# Patient Record
Sex: MALE | Race: WHITE | NOT HISPANIC OR LATINO | URBAN - NONMETROPOLITAN AREA
[De-identification: names, ages, dates, MRNs, and addresses within clinical notes are randomized per-mention and may not be internally consistent; named-entity substitution may affect disease eponyms.]

---

## 2018-07-06 DIAGNOSIS — I48.91 ATRIAL FIBRILLATION, UNSPECIFIED TYPE (HCC): Primary | ICD-10-CM

## 2018-07-06 RX ORDER — WARFARIN SODIUM 5 MG/1
TABLET ORAL
Qty: 30 TABLET | Refills: 5 | Status: SHIPPED | OUTPATIENT
Start: 2018-07-06 | End: 2019-03-25 | Stop reason: SDUPTHER

## 2018-07-06 NOTE — TELEPHONE ENCOUNTER
----- Message from [facility] sent at 7/6/2018  8:28 AM EDT -----  Regarding: medication renewal  Contact: [phone]  Received a fax to renew Warfarin 5 mg QD from [facility] in [place]  30 days  5 refills    Juan Arias

## 2018-07-31 ENCOUNTER — TRANSCRIBE ORDERS (OUTPATIENT)
Dept: ADMINISTRATIVE | Facility: HOSPITAL | Age: 58
End: 2018-07-31

## 2018-07-31 ENCOUNTER — APPOINTMENT (OUTPATIENT)
Dept: LAB | Facility: HOSPITAL | Age: 58
End: 2018-07-31
Payer: MEDICARE

## 2018-07-31 DIAGNOSIS — E11.8 TYPE 2 DIABETES MELLITUS WITH COMPLICATION, WITHOUT LONG-TERM CURRENT USE OF INSULIN (HCC): ICD-10-CM

## 2018-07-31 DIAGNOSIS — Z13.9 SCREENING FOR CONDITION: ICD-10-CM

## 2018-07-31 DIAGNOSIS — E78.5 HYPERLIPIDEMIA, UNSPECIFIED HYPERLIPIDEMIA TYPE: ICD-10-CM

## 2018-07-31 DIAGNOSIS — E55.9 VITAMIN D DEFICIENCY: ICD-10-CM

## 2018-07-31 DIAGNOSIS — E11.8 TYPE 2 DIABETES MELLITUS WITH COMPLICATION, WITHOUT LONG-TERM CURRENT USE OF INSULIN (HCC): Primary | ICD-10-CM

## 2018-07-31 LAB
25(OH)D3 SERPL-MCNC: 12.1 NG/ML (ref 30–100)
ALBUMIN SERPL BCP-MCNC: 3.5 G/DL (ref 3.5–5)
ALP SERPL-CCNC: 125 U/L (ref 46–116)
ALT SERPL W P-5'-P-CCNC: 32 U/L (ref 12–78)
ANION GAP SERPL CALCULATED.3IONS-SCNC: 12 MMOL/L (ref 4–13)
AST SERPL W P-5'-P-CCNC: 30 U/L (ref 5–45)
BILIRUB SERPL-MCNC: 0.5 MG/DL (ref 0.2–1)
BUN SERPL-MCNC: 21 MG/DL (ref 5–25)
CALCIUM SERPL-MCNC: 8.9 MG/DL (ref 8.3–10.1)
CHLORIDE SERPL-SCNC: 102 MMOL/L (ref 100–108)
CHOLEST SERPL-MCNC: 125 MG/DL (ref 50–200)
CO2 SERPL-SCNC: 22 MMOL/L (ref 21–32)
CREAT SERPL-MCNC: 1.47 MG/DL (ref 0.6–1.3)
EST. AVERAGE GLUCOSE BLD GHB EST-MCNC: 114 MG/DL
GFR SERPL CREATININE-BSD FRML MDRD: 51 ML/MIN/1.73SQ M
GLUCOSE P FAST SERPL-MCNC: 121 MG/DL (ref 65–99)
HBA1C MFR BLD: 5.6 % (ref 4.2–6.3)
HDLC SERPL-MCNC: 29 MG/DL (ref 40–60)
LDLC SERPL CALC-MCNC: 65 MG/DL (ref 0–100)
NONHDLC SERPL-MCNC: 96 MG/DL
POTASSIUM SERPL-SCNC: 5 MMOL/L (ref 3.5–5.3)
PROT SERPL-MCNC: 7.7 G/DL (ref 6.4–8.2)
SODIUM SERPL-SCNC: 136 MMOL/L (ref 136–145)
TRIGL SERPL-MCNC: 157 MG/DL
TSH SERPL DL<=0.05 MIU/L-ACNC: 2 UIU/ML (ref 0.36–3.74)

## 2018-07-31 PROCEDURE — 80061 LIPID PANEL: CPT

## 2018-07-31 PROCEDURE — 80053 COMPREHEN METABOLIC PANEL: CPT

## 2018-07-31 PROCEDURE — 82306 VITAMIN D 25 HYDROXY: CPT

## 2018-07-31 PROCEDURE — 83036 HEMOGLOBIN GLYCOSYLATED A1C: CPT

## 2018-07-31 PROCEDURE — 84443 ASSAY THYROID STIM HORMONE: CPT

## 2018-07-31 PROCEDURE — 36415 COLL VENOUS BLD VENIPUNCTURE: CPT

## 2018-09-01 DIAGNOSIS — I48.91 ATRIAL FIBRILLATION, UNSPECIFIED TYPE (HCC): Primary | ICD-10-CM

## 2018-09-05 RX ORDER — DIGOXIN 250 MCG
TABLET ORAL
Qty: 30 TABLET | Refills: 5 | Status: SHIPPED | OUTPATIENT
Start: 2018-09-05 | End: 2019-03-19 | Stop reason: SDUPTHER

## 2018-10-03 DIAGNOSIS — I25.10 CORONARY ARTERY DISEASE INVOLVING NATIVE CORONARY ARTERY OF NATIVE HEART WITHOUT ANGINA PECTORIS: Primary | ICD-10-CM

## 2018-10-03 RX ORDER — FUROSEMIDE 40 MG/1
TABLET ORAL
Qty: 30 TABLET | Refills: 5 | Status: SHIPPED | OUTPATIENT
Start: 2018-10-03 | End: 2019-02-26 | Stop reason: SDUPTHER

## 2018-10-03 RX ORDER — CARVEDILOL 6.25 MG/1
TABLET ORAL
Qty: 60 TABLET | Refills: 5 | Status: SHIPPED | OUTPATIENT
Start: 2018-10-03 | End: 2019-02-28 | Stop reason: SDUPTHER

## 2018-10-17 PROBLEM — I50.9 CONGESTIVE HEART FAILURE (CHF) (HCC): Status: ACTIVE | Noted: 2018-10-17

## 2018-10-17 PROBLEM — E66.9 OBESITY: Status: ACTIVE | Noted: 2018-10-17

## 2018-10-17 PROBLEM — E11.9 DIABETES MELLITUS (HCC): Status: ACTIVE | Noted: 2018-10-17

## 2018-10-17 PROBLEM — I48.91 ATRIAL FIBRILLATION (HCC): Status: ACTIVE | Noted: 2018-10-17

## 2018-10-17 RX ORDER — LISINOPRIL 10 MG/1
10 TABLET ORAL DAILY
COMMUNITY
End: 2019-01-29 | Stop reason: SDUPTHER

## 2018-10-24 ENCOUNTER — OFFICE VISIT (OUTPATIENT)
Dept: CARDIOLOGY CLINIC | Facility: CLINIC | Age: 58
End: 2018-10-24
Payer: MEDICARE

## 2018-10-24 ENCOUNTER — ANTICOAG VISIT (OUTPATIENT)
Dept: CARDIOLOGY CLINIC | Facility: CLINIC | Age: 58
End: 2018-10-24
Payer: MEDICARE

## 2018-10-24 VITALS
SYSTOLIC BLOOD PRESSURE: 120 MMHG | HEIGHT: 72 IN | WEIGHT: 310 LBS | HEART RATE: 93 BPM | DIASTOLIC BLOOD PRESSURE: 70 MMHG | BODY MASS INDEX: 41.99 KG/M2

## 2018-10-24 DIAGNOSIS — I89.0 LYMPHEDEMA: ICD-10-CM

## 2018-10-24 DIAGNOSIS — I48.20 CHRONIC ATRIAL FIBRILLATION (HCC): ICD-10-CM

## 2018-10-24 DIAGNOSIS — Z79.01 LONG TERM (CURRENT) USE OF ANTICOAGULANTS: Primary | ICD-10-CM

## 2018-10-24 DIAGNOSIS — I48.20 CHRONIC A-FIB (HCC): Primary | ICD-10-CM

## 2018-10-24 DIAGNOSIS — I50.32 CHRONIC DIASTOLIC CONGESTIVE HEART FAILURE (HCC): ICD-10-CM

## 2018-10-24 LAB — INTERNATIONAL NORMALIZATION RATIO, POC: 1.7

## 2018-10-24 PROCEDURE — 85610 PROTHROMBIN TIME: CPT | Performed by: PHYSICIAN ASSISTANT

## 2018-10-24 PROCEDURE — 93000 ELECTROCARDIOGRAM COMPLETE: CPT | Performed by: INTERNAL MEDICINE

## 2018-10-24 PROCEDURE — 99214 OFFICE O/P EST MOD 30 MIN: CPT | Performed by: INTERNAL MEDICINE

## 2018-10-24 PROCEDURE — 99211 OFF/OP EST MAY X REQ PHY/QHP: CPT | Performed by: PHYSICIAN ASSISTANT

## 2018-10-24 RX ORDER — ERGOCALCIFEROL 1.25 MG/1
50000 CAPSULE ORAL WEEKLY
COMMUNITY
End: 2019-04-11 | Stop reason: ALTCHOICE

## 2018-10-24 NOTE — PATIENT INSTRUCTIONS

## 2018-10-24 NOTE — PROGRESS NOTES
Subjective:        Patient ID: Sally Lara is a 60 y o  male  Chief Complaint:  June Martin is here for routine cardiac follow-up  Denies any concerning symptoms  He admits to mild chronic dyspnea on exertion nothing worse than his usual   Denies any chest pain, palpitations, presyncope, syncope  His legs are chronically edematous/swallowing, nothing new here  He has put on some weight  He has not had a protime/INR since February  He complains of excess bruising  INR today 1 7  I had a lengthy discussion with him regarding the risk of anticoagulant such as warfarin without routine blood work, he says he will be better getting this done monthly  EKG rate controlled AFib  The following portions of the patient's history were reviewed and updated as appropriate: allergies, current medications, past family history, past medical history, past social history, past surgical history and problem list   Review of Systems   Constitution: Negative for chills, diaphoresis, malaise/fatigue and weight gain  HENT: Negative for nosebleeds and stridor  Eyes: Negative for double vision, vision loss in left eye, vision loss in right eye and visual disturbance  Cardiovascular: Positive for dyspnea on exertion and leg swelling  Negative for chest pain, claudication, cyanosis, irregular heartbeat, near-syncope, orthopnea, palpitations, paroxysmal nocturnal dyspnea and syncope  Respiratory: Negative for cough, shortness of breath, snoring and wheezing  Endocrine: Negative for polydipsia, polyphagia and polyuria  Hematologic/Lymphatic: Negative for bleeding problem  Does not bruise/bleed easily  Skin: Negative for flushing and rash  Musculoskeletal: Negative for falls and myalgias  Gastrointestinal: Negative for abdominal pain, heartburn, hematemesis, hematochezia, melena and nausea  Genitourinary: Negative for hematuria     Neurological: Negative for brief paralysis, dizziness, focal weakness, headaches, light-headedness, loss of balance and vertigo  Psychiatric/Behavioral: Negative for altered mental status and substance abuse  Allergic/Immunologic: Negative for hives  Objective:      /70   Pulse 93   Ht 6' (1 829 m)   Wt (!) 141 kg (310 lb)   BMI 42 04 kg/m²   Physical Exam   Constitutional: He is oriented to person, place, and time  He appears well-developed and well-nourished  No distress  HENT:   Head: Normocephalic and atraumatic  Eyes: Pupils are equal, round, and reactive to light  EOM are normal  No scleral icterus  Neck: Normal range of motion  Neck supple  No JVD present  No thyromegaly present  Cardiovascular: Normal rate and normal heart sounds  Exam reveals no gallop and no friction rub  No murmur heard  Pulmonary/Chest: Effort normal and breath sounds normal  No stridor  No respiratory distress  He has no wheezes  He has no rales  Abdominal: Soft  Bowel sounds are normal  He exhibits no distension and no mass  There is no tenderness  Musculoskeletal: Normal range of motion  He exhibits edema  He exhibits no deformity  Neurological: He is alert and oriented to person, place, and time  Coordination normal    Skin: Skin is warm and dry  No erythema  No pallor  Psychiatric: He has a normal mood and affect   His behavior is normal        Lab Review:   Appointment on 07/31/2018   Component Date Value    Sodium 07/31/2018 136     Potassium 07/31/2018 5 0     Chloride 07/31/2018 102     CO2 07/31/2018 22     ANION GAP 07/31/2018 12     BUN 07/31/2018 21     Creatinine 07/31/2018 1 47*    Glucose, Fasting 07/31/2018 121*    Calcium 07/31/2018 8 9     AST 07/31/2018 30     ALT 07/31/2018 32     Alkaline Phosphatase 07/31/2018 125*    Total Protein 07/31/2018 7 7     Albumin 07/31/2018 3 5     Total Bilirubin 07/31/2018 0 50     eGFR 07/31/2018 51     TSH 3RD GENERATON 07/31/2018 2 002     Vit D, 25-Hydroxy 07/31/2018 12 1*    Cholesterol 07/31/2018 125     Triglycerides 07/31/2018 157*    HDL, Direct 07/31/2018 29*    LDL Calculated 07/31/2018 65     Non-HDL-Chol (CHOL-HDL) 07/31/2018 96     Hemoglobin A1C 07/31/2018 5 6     EAG 07/31/2018 114      No results found  Assessment:       1  Chronic a-fib (HCC)  POCT ECG    NT-BNP PRO   2  Chronic diastolic congestive heart failure (HCC)     3  Lymphedema          Plan:       Madelaine Wilcox is without any signs or symptoms reminiscent of angina, decompensated heart failure nor electrical instability  His lower extremity edema, lymphedema, is mostly obesity and dependent due to inactivity  He politely declined referral to lymphedema management clinical/wound care today  I ordered a BNP to see if we need to escalate his diuretics, I have some pause in light of mildly decreased renal function on his latest blood work noted  I stressed the importance of monthly INRs  I gave him a list of all the novel oral anticoagulants out there and told him to take the list to the pharmacy and get pricing, if affordable he will call us and let us know immediately to switch him to 1 of these  I will see him back in 6 months, sooner as needed

## 2018-10-24 NOTE — PROGRESS NOTES
Patient missed Monday dose  Dr Thi Goss saw him today and told him how he is not safe taking coumadin without getting his INR done  Patient was very overdue  Made him a tentative appt next week in [place] for INR since he works in [employer]  I told him I would call him back once I talked to you if he needs to increase or not  Increase dose and recheck next week  LMOM

## 2018-10-31 ENCOUNTER — APPOINTMENT (OUTPATIENT)
Dept: LAB | Facility: HOSPITAL | Age: 58
End: 2018-10-31
Attending: INTERNAL MEDICINE
Payer: MEDICARE

## 2018-10-31 DIAGNOSIS — I48.20 CHRONIC A-FIB (HCC): ICD-10-CM

## 2018-10-31 LAB — NT-PROBNP SERPL-MCNC: 1807 PG/ML

## 2018-10-31 PROCEDURE — 83880 ASSAY OF NATRIURETIC PEPTIDE: CPT

## 2018-10-31 PROCEDURE — 36415 COLL VENOUS BLD VENIPUNCTURE: CPT

## 2018-11-01 ENCOUNTER — ANTICOAG VISIT (OUTPATIENT)
Dept: CARDIOLOGY CLINIC | Facility: CLINIC | Age: 58
End: 2018-11-01
Payer: MEDICARE

## 2018-11-01 DIAGNOSIS — I48.20 CHRONIC ATRIAL FIBRILLATION (HCC): ICD-10-CM

## 2018-11-01 DIAGNOSIS — Z79.01 LONG TERM (CURRENT) USE OF ANTICOAGULANTS: Primary | ICD-10-CM

## 2018-11-01 LAB — INTERNATIONAL NORMALIZATION RATIO, POC: 2

## 2018-11-01 PROCEDURE — 85610 PROTHROMBIN TIME: CPT | Performed by: PHYSICIAN ASSISTANT

## 2018-11-01 PROCEDURE — 99211 OFF/OP EST MAY X REQ PHY/QHP: CPT | Performed by: PHYSICIAN ASSISTANT

## 2018-11-01 NOTE — PROGRESS NOTES
No issues  Please advise  If no changes in medication health or diet  No missed or extra doses  No s/s of bleeding TIA or CVA  No new ABX, NSAIDs OCT meds, or supplements  Continue the same dose and recheck in one month     Christiano Ho PA-C

## 2019-01-24 ENCOUNTER — ANTICOAG VISIT (OUTPATIENT)
Dept: CARDIOLOGY CLINIC | Facility: CLINIC | Age: 59
End: 2019-01-24
Payer: MEDICARE

## 2019-01-24 DIAGNOSIS — Z79.01 LONG TERM (CURRENT) USE OF ANTICOAGULANTS: Primary | ICD-10-CM

## 2019-01-24 DIAGNOSIS — I48.20 CHRONIC ATRIAL FIBRILLATION (HCC): ICD-10-CM

## 2019-01-24 LAB — INTERNATIONAL NORMALIZATION RATIO, POC: 2.9

## 2019-01-24 PROCEDURE — 85610 PROTHROMBIN TIME: CPT | Performed by: PHYSICIAN ASSISTANT

## 2019-01-24 PROCEDURE — 93793 ANTICOAG MGMT PT WARFARIN: CPT | Performed by: PHYSICIAN ASSISTANT

## 2019-01-24 NOTE — PATIENT INSTRUCTIONS
If no changes in medication health or diet  No missed or extra doses  No s/s of bleeding TIA or CVA  No new ABX, NSAIDs OCT meds, or supplements  Dose as instructed and recheck in one month     Diana Nash PA-C

## 2019-01-29 DIAGNOSIS — I10 ESSENTIAL HYPERTENSION: Primary | ICD-10-CM

## 2019-01-31 RX ORDER — LISINOPRIL 10 MG/1
TABLET ORAL
Qty: 90 TABLET | Refills: 3 | Status: SHIPPED | OUTPATIENT
Start: 2019-01-31 | End: 2019-02-28 | Stop reason: SDUPTHER

## 2019-02-25 ENCOUNTER — TRANSCRIBE ORDERS (OUTPATIENT)
Dept: ADMINISTRATIVE | Facility: HOSPITAL | Age: 59
End: 2019-02-25

## 2019-02-25 ENCOUNTER — HOSPITAL ENCOUNTER (OUTPATIENT)
Dept: RADIOLOGY | Facility: HOSPITAL | Age: 59
Discharge: HOME | End: 2019-02-25
Payer: MEDICARE

## 2019-02-25 DIAGNOSIS — M19.90 OSTEOARTHRITIS, UNSPECIFIED OSTEOARTHRITIS TYPE, UNSPECIFIED SITE: ICD-10-CM

## 2019-02-25 DIAGNOSIS — M19.90 OSTEOARTHRITIS, UNSPECIFIED OSTEOARTHRITIS TYPE, UNSPECIFIED SITE: Primary | ICD-10-CM

## 2019-02-25 PROCEDURE — 73502 X-RAY EXAM HIP UNI 2-3 VIEWS: CPT

## 2019-02-26 DIAGNOSIS — I25.10 CORONARY ARTERY DISEASE INVOLVING NATIVE CORONARY ARTERY OF NATIVE HEART WITHOUT ANGINA PECTORIS: ICD-10-CM

## 2019-02-27 RX ORDER — FUROSEMIDE 40 MG/1
40 TABLET ORAL DAILY
Qty: 90 TABLET | Refills: 3 | Status: SHIPPED | OUTPATIENT
Start: 2019-02-27 | End: 2020-03-03 | Stop reason: SDUPTHER

## 2019-02-28 DIAGNOSIS — I10 ESSENTIAL HYPERTENSION: ICD-10-CM

## 2019-02-28 DIAGNOSIS — I25.10 CORONARY ARTERY DISEASE INVOLVING NATIVE CORONARY ARTERY OF NATIVE HEART WITHOUT ANGINA PECTORIS: ICD-10-CM

## 2019-02-28 RX ORDER — LISINOPRIL 10 MG/1
10 TABLET ORAL DAILY
Qty: 90 TABLET | Refills: 3 | Status: SHIPPED | OUTPATIENT
Start: 2019-02-28 | End: 2020-01-22

## 2019-02-28 RX ORDER — CARVEDILOL 6.25 MG/1
6.25 TABLET ORAL 2 TIMES DAILY WITH MEALS
Qty: 180 TABLET | Refills: 3 | Status: SHIPPED | OUTPATIENT
Start: 2019-02-28 | End: 2020-03-03 | Stop reason: SDUPTHER

## 2019-03-07 ENCOUNTER — ANTICOAG VISIT (OUTPATIENT)
Dept: CARDIOLOGY CLINIC | Facility: CLINIC | Age: 59
End: 2019-03-07
Payer: MEDICARE

## 2019-03-07 DIAGNOSIS — Z79.01 LONG TERM (CURRENT) USE OF ANTICOAGULANTS: Primary | ICD-10-CM

## 2019-03-07 DIAGNOSIS — I48.20 CHRONIC ATRIAL FIBRILLATION (HCC): ICD-10-CM

## 2019-03-07 LAB — INTERNATIONAL NORMALIZATION RATIO, POC: 3.3

## 2019-03-07 PROCEDURE — 85610 PROTHROMBIN TIME: CPT | Performed by: PHYSICIAN ASSISTANT

## 2019-03-07 PROCEDURE — 93793 ANTICOAG MGMT PT WARFARIN: CPT | Performed by: PHYSICIAN ASSISTANT

## 2019-03-07 NOTE — PATIENT INSTRUCTIONS
If no changes in medication (no new abx, NSAIDs, steroids, OTC meds, or supplements), health, or diet, no missed or extra doses, and no s/sx of bleeding, TIA, or CVA, dose as instructed and recheck in 2 weeks     Jessica Melgar PA-C

## 2019-03-19 DIAGNOSIS — I48.91 ATRIAL FIBRILLATION, UNSPECIFIED TYPE (HCC): ICD-10-CM

## 2019-03-20 RX ORDER — DIGOXIN 250 MCG
TABLET ORAL
Qty: 30 TABLET | Refills: 5 | Status: SHIPPED | OUTPATIENT
Start: 2019-03-20 | End: 2019-10-29 | Stop reason: SDUPTHER

## 2019-03-21 ENCOUNTER — ANTICOAG VISIT (OUTPATIENT)
Dept: CARDIOLOGY CLINIC | Facility: CLINIC | Age: 59
End: 2019-03-21
Payer: MEDICARE

## 2019-03-21 DIAGNOSIS — I48.20 CHRONIC ATRIAL FIBRILLATION (HCC): ICD-10-CM

## 2019-03-21 DIAGNOSIS — Z79.01 LONG TERM (CURRENT) USE OF ANTICOAGULANTS: ICD-10-CM

## 2019-03-21 LAB — SL AMB POCT INR: 3.3

## 2019-03-21 PROCEDURE — 85610 PROTHROMBIN TIME: CPT | Performed by: PHYSICIAN ASSISTANT

## 2019-03-21 PROCEDURE — 93793 ANTICOAG MGMT PT WARFARIN: CPT | Performed by: PHYSICIAN ASSISTANT

## 2019-03-25 DIAGNOSIS — I48.91 ATRIAL FIBRILLATION, UNSPECIFIED TYPE (HCC): ICD-10-CM

## 2019-03-25 RX ORDER — WARFARIN SODIUM 5 MG/1
TABLET ORAL
Qty: 90 TABLET | Refills: 3 | Status: SHIPPED | OUTPATIENT
Start: 2019-03-25 | End: 2020-02-06 | Stop reason: ALTCHOICE

## 2019-04-11 ENCOUNTER — HOSPITAL ENCOUNTER (EMERGENCY)
Facility: HOSPITAL | Age: 59
Discharge: HOME | End: 2019-04-11
Attending: EMERGENCY MEDICINE | Admitting: EMERGENCY MEDICINE
Payer: MEDICARE

## 2019-04-11 VITALS
BODY MASS INDEX: 39.83 KG/M2 | RESPIRATION RATE: 16 BRPM | SYSTOLIC BLOOD PRESSURE: 124 MMHG | HEART RATE: 67 BPM | DIASTOLIC BLOOD PRESSURE: 65 MMHG | HEIGHT: 72 IN | OXYGEN SATURATION: 98 % | WEIGHT: 294.09 LBS | TEMPERATURE: 98.2 F

## 2019-04-11 DIAGNOSIS — I83.813 VARICOSE VEINS OF BILATERAL LOWER EXTREMITIES WITH PAIN: Primary | ICD-10-CM

## 2019-04-11 DIAGNOSIS — N28.9 RENAL INSUFFICIENCY: ICD-10-CM

## 2019-04-11 DIAGNOSIS — D64.9 ANEMIA: ICD-10-CM

## 2019-04-11 LAB
ALBUMIN SERPL BCP-MCNC: 3.6 G/DL (ref 3.5–5)
ALP SERPL-CCNC: 120 U/L (ref 46–116)
ALT SERPL W P-5'-P-CCNC: 25 U/L (ref 12–78)
ANION GAP SERPL CALCULATED.3IONS-SCNC: 13 MMOL/L (ref 4–13)
AST SERPL W P-5'-P-CCNC: 20 U/L (ref 5–45)
BASOPHILS # BLD AUTO: 0.03 THOUSANDS/ΜL (ref 0–0.1)
BASOPHILS NFR BLD AUTO: 1 % (ref 0–1)
BILIRUB SERPL-MCNC: 0.9 MG/DL (ref 0.2–1)
BUN SERPL-MCNC: 42 MG/DL (ref 5–25)
CALCIUM SERPL-MCNC: 9.2 MG/DL (ref 8.3–10.1)
CHLORIDE SERPL-SCNC: 103 MMOL/L (ref 100–108)
CO2 SERPL-SCNC: 23 MMOL/L (ref 21–32)
CREAT SERPL-MCNC: 1.86 MG/DL (ref 0.6–1.3)
EOSINOPHIL # BLD AUTO: 0.1 THOUSAND/ΜL (ref 0–0.61)
EOSINOPHIL NFR BLD AUTO: 2 % (ref 0–6)
ERYTHROCYTE [DISTWIDTH] IN BLOOD BY AUTOMATED COUNT: 13.5 % (ref 11.6–15.1)
GFR SERPL CREATININE-BSD FRML MDRD: 38 ML/MIN/1.73SQ M
GLUCOSE SERPL-MCNC: 194 MG/DL (ref 65–140)
HCT VFR BLD AUTO: 32.1 % (ref 36.5–49.3)
HGB BLD-MCNC: 10.5 G/DL (ref 12–17)
HOLD SPECIMEN: NORMAL
IMM GRANULOCYTES # BLD AUTO: 0.01 THOUSAND/UL (ref 0–0.2)
IMM GRANULOCYTES NFR BLD AUTO: 0 % (ref 0–2)
INR PPP: 1.8 (ref 0.86–1.17)
LYMPHOCYTES # BLD AUTO: 0.74 THOUSANDS/ΜL (ref 0.6–4.47)
LYMPHOCYTES NFR BLD AUTO: 14 % (ref 14–44)
MCH RBC QN AUTO: 35 PG (ref 26.8–34.3)
MCHC RBC AUTO-ENTMCNC: 32.7 G/DL (ref 31.4–37.4)
MCV RBC AUTO: 107 FL (ref 82–98)
MONOCYTES # BLD AUTO: 0.35 THOUSAND/ΜL (ref 0.17–1.22)
MONOCYTES NFR BLD AUTO: 7 % (ref 4–12)
NEUTROPHILS # BLD AUTO: 4.17 THOUSANDS/ΜL (ref 1.85–7.62)
NEUTS SEG NFR BLD AUTO: 76 % (ref 43–75)
NRBC BLD AUTO-RTO: 0 /100 WBCS
PLATELET # BLD AUTO: 99 THOUSANDS/UL (ref 149–390)
PMV BLD AUTO: 11.6 FL (ref 8.9–12.7)
POTASSIUM SERPL-SCNC: 4.8 MMOL/L (ref 3.5–5.3)
PROT SERPL-MCNC: 8.4 G/DL (ref 6.4–8.2)
PROTHROMBIN TIME: 20 SECONDS (ref 11.8–14.2)
RBC # BLD AUTO: 3 MILLION/UL (ref 3.88–5.62)
SODIUM SERPL-SCNC: 139 MMOL/L (ref 136–145)
WBC # BLD AUTO: 5.4 THOUSAND/UL (ref 4.31–10.16)

## 2019-04-11 PROCEDURE — 36415 COLL VENOUS BLD VENIPUNCTURE: CPT | Performed by: EMERGENCY MEDICINE

## 2019-04-11 PROCEDURE — 80053 COMPREHEN METABOLIC PANEL: CPT | Performed by: EMERGENCY MEDICINE

## 2019-04-11 PROCEDURE — 99283 EMERGENCY DEPT VISIT LOW MDM: CPT

## 2019-04-11 PROCEDURE — 85610 PROTHROMBIN TIME: CPT | Performed by: EMERGENCY MEDICINE

## 2019-04-11 PROCEDURE — 99283 EMERGENCY DEPT VISIT LOW MDM: CPT | Performed by: EMERGENCY MEDICINE

## 2019-04-11 PROCEDURE — 85025 COMPLETE CBC W/AUTO DIFF WBC: CPT | Performed by: EMERGENCY MEDICINE

## 2019-04-26 ENCOUNTER — OFFICE VISIT (OUTPATIENT)
Dept: CARDIOLOGY CLINIC | Facility: CLINIC | Age: 59
End: 2019-04-26
Payer: MEDICARE

## 2019-04-26 ENCOUNTER — ANTICOAG VISIT (OUTPATIENT)
Dept: CARDIOLOGY CLINIC | Facility: CLINIC | Age: 59
End: 2019-04-26
Payer: MEDICARE

## 2019-04-26 VITALS
WEIGHT: 285 LBS | HEART RATE: 70 BPM | HEIGHT: 72 IN | BODY MASS INDEX: 38.6 KG/M2 | DIASTOLIC BLOOD PRESSURE: 72 MMHG | SYSTOLIC BLOOD PRESSURE: 118 MMHG

## 2019-04-26 DIAGNOSIS — I89.0 LYMPHEDEMA: ICD-10-CM

## 2019-04-26 DIAGNOSIS — I50.32 CHRONIC DIASTOLIC CONGESTIVE HEART FAILURE (HCC): Primary | ICD-10-CM

## 2019-04-26 DIAGNOSIS — I48.20 CHRONIC ATRIAL FIBRILLATION (HCC): ICD-10-CM

## 2019-04-26 DIAGNOSIS — Z79.01 LONG TERM (CURRENT) USE OF ANTICOAGULANTS: ICD-10-CM

## 2019-04-26 DIAGNOSIS — E66.9 OBESITY WITH SERIOUS COMORBIDITY, UNSPECIFIED CLASSIFICATION, UNSPECIFIED OBESITY TYPE: ICD-10-CM

## 2019-04-26 LAB — SL AMB POCT INR: 1.4

## 2019-04-26 PROCEDURE — 85610 PROTHROMBIN TIME: CPT | Performed by: PHYSICIAN ASSISTANT

## 2019-04-26 PROCEDURE — 99214 OFFICE O/P EST MOD 30 MIN: CPT | Performed by: INTERNAL MEDICINE

## 2019-04-26 PROCEDURE — 93793 ANTICOAG MGMT PT WARFARIN: CPT | Performed by: PHYSICIAN ASSISTANT

## 2019-04-26 RX ORDER — METOLAZONE 5 MG/1
TABLET ORAL
Qty: 15 TABLET | Refills: 3 | Status: SHIPPED | OUTPATIENT
Start: 2019-04-26 | End: 2020-01-22

## 2019-05-01 ENCOUNTER — CONSULT (OUTPATIENT)
Dept: VASCULAR SURGERY | Facility: HOSPITAL | Age: 59
End: 2019-05-01
Payer: MEDICARE

## 2019-05-01 VITALS
TEMPERATURE: 97.4 F | SYSTOLIC BLOOD PRESSURE: 103 MMHG | WEIGHT: 287 LBS | HEART RATE: 96 BPM | DIASTOLIC BLOOD PRESSURE: 61 MMHG | BODY MASS INDEX: 40.18 KG/M2 | HEIGHT: 71 IN

## 2019-05-01 DIAGNOSIS — I89.0 LYMPHEDEMA: ICD-10-CM

## 2019-05-01 DIAGNOSIS — I83.11 VARICOSE VEINS OF BOTH LOWER EXTREMITIES WITH INFLAMMATION: ICD-10-CM

## 2019-05-01 DIAGNOSIS — I83.12 VARICOSE VEINS OF BOTH LOWER EXTREMITIES WITH INFLAMMATION: ICD-10-CM

## 2019-05-01 DIAGNOSIS — E66.01 CLASS 3 SEVERE OBESITY WITH SERIOUS COMORBIDITY AND BODY MASS INDEX (BMI) OF 40.0 TO 44.9 IN ADULT, UNSPECIFIED OBESITY TYPE (HCC): ICD-10-CM

## 2019-05-01 DIAGNOSIS — I87.2 CHRONIC VENOUS INSUFFICIENCY: Primary | ICD-10-CM

## 2019-05-01 PROCEDURE — 99203 OFFICE O/P NEW LOW 30 MIN: CPT | Performed by: NURSE PRACTITIONER

## 2019-05-09 ENCOUNTER — ANTICOAG VISIT (OUTPATIENT)
Dept: CARDIOLOGY CLINIC | Facility: CLINIC | Age: 59
End: 2019-05-09
Payer: MEDICARE

## 2019-05-09 DIAGNOSIS — Z79.01 LONG TERM (CURRENT) USE OF ANTICOAGULANTS: ICD-10-CM

## 2019-05-09 DIAGNOSIS — I48.20 CHRONIC ATRIAL FIBRILLATION (HCC): ICD-10-CM

## 2019-05-09 LAB — SL AMB POCT INR: 2.2

## 2019-05-09 PROCEDURE — 93793 ANTICOAG MGMT PT WARFARIN: CPT | Performed by: PHYSICIAN ASSISTANT

## 2019-05-09 PROCEDURE — 85610 PROTHROMBIN TIME: CPT | Performed by: PHYSICIAN ASSISTANT

## 2019-06-07 ENCOUNTER — ANTICOAG VISIT (OUTPATIENT)
Dept: CARDIOLOGY CLINIC | Facility: CLINIC | Age: 59
End: 2019-06-07
Payer: MEDICARE

## 2019-06-07 DIAGNOSIS — Z79.01 LONG TERM (CURRENT) USE OF ANTICOAGULANTS: ICD-10-CM

## 2019-06-07 DIAGNOSIS — I48.20 CHRONIC ATRIAL FIBRILLATION (HCC): ICD-10-CM

## 2019-06-07 LAB — SL AMB POCT INR: 2.9

## 2019-06-07 PROCEDURE — 85610 PROTHROMBIN TIME: CPT | Performed by: PHYSICIAN ASSISTANT

## 2019-06-07 PROCEDURE — 93793 ANTICOAG MGMT PT WARFARIN: CPT | Performed by: PHYSICIAN ASSISTANT

## 2019-07-04 ENCOUNTER — HOSPITAL ENCOUNTER (EMERGENCY)
Facility: HOSPITAL | Age: 59
Discharge: HOME | End: 2019-07-04
Attending: EMERGENCY MEDICINE
Payer: MEDICARE

## 2019-07-04 VITALS
WEIGHT: 299.38 LBS | OXYGEN SATURATION: 98 % | BODY MASS INDEX: 41.91 KG/M2 | RESPIRATION RATE: 20 BRPM | SYSTOLIC BLOOD PRESSURE: 114 MMHG | HEART RATE: 62 BPM | HEIGHT: 71 IN | TEMPERATURE: 98.4 F | DIASTOLIC BLOOD PRESSURE: 56 MMHG

## 2019-07-04 DIAGNOSIS — S91.319A FOOT LACERATION: Primary | ICD-10-CM

## 2019-07-04 PROCEDURE — 99283 EMERGENCY DEPT VISIT LOW MDM: CPT

## 2019-07-04 PROCEDURE — 99283 EMERGENCY DEPT VISIT LOW MDM: CPT | Performed by: PHYSICIAN ASSISTANT

## 2019-07-04 NOTE — ED PROVIDER NOTES
History  Chief Complaint   Patient presents with    Foot Injury     left foot bleeding, unknown source  PT was getting out of the pool and noticed blood coming out of his foot  Patient presents to the emergency department today for evaluation a bleeding left foot  Patient states he was outside when he noticed the left foot was bleeding  He has neuropathy of the foot therefore does not have much feeling  He denies any bleeding upon arrival however  Denies any novel sensation deficits  Denies pain  States tetanus is up-to-date  Does take warfarin  Prior to Admission Medications   Prescriptions Last Dose Informant Patient Reported? Taking?   carvedilol (COREG) 6 25 mg tablet  Self No No   Sig: Take 1 tablet (6 25 mg total) by mouth 2 (two) times a day with meals   digoxin (LANOXIN) 0 25 mg tablet  Self No No   Sig: TAKE 1 TABLET BY MOUTH ONCE DAILY   furosemide (LASIX) 40 mg tablet  Self No No   Sig: Take 1 tablet (40 mg total) by mouth daily   lisinopril (ZESTRIL) 10 mg tablet  Self No No   Sig: Take 1 tablet (10 mg total) by mouth daily   metFORMIN (GLUCOPHAGE) 1000 MG tablet  Self Yes No   Sig: Take 1,000 mg by mouth 2 (two) times a day with meals  metolazone (ZAROXOLYN) 5 mg tablet  Self No No   Sig: One day a week 30 minutes before lasix dose that day  warfarin (COUMADIN) 10 mg tablet  Self Yes No   Sig: Take 10 mg by mouth every other day Monday wednesday   warfarin (COUMADIN) 5 mg tablet  Self No No   Sig: Take 1 tablet by mouth daily or as directed  Facility-Administered Medications: None       Past Medical History:   Diagnosis Date    A-fib [facility])     Cardiac disease     Chronic combined systolic (congestive) and diastolic (congestive) heart failure (HCC)     Diabetes mellitus [facility] [address] )     Dilated cardiomyopathy ([facility] [address] )     History of echocardiogram 11/24/2014    EF 0 30 (30%), Likely mod LV systolic dysfunction  Likely RV dysfunction as well      History of Lexiscan MPI 02/19/2016    EF 0 43 (43%), no prior MI or ischemia   Hx of echocardiogram 04/28/2017    Normal EF, Normal LV systolic function  Mild concentric LV hypertrophy  Mild mitral and tricuspid regurg   Hx: recurrent pneumonia     Hypertension     Long term (current) use of anticoagulants     Morbid (severe) obesity due to excess calories (HCC)     Neuropathy        Past Surgical History:   Procedure Laterality Date    HERNIA REPAIR      SPLENECTOMY, TOTAL      VASCULAR SURGERY Right     leg       History reviewed  No pertinent family history  I have reviewed and agree with the history as documented  Social History     Tobacco Use    Smoking status: Former Smoker    Smokeless tobacco: Never Used    Tobacco comment: smokes only when he drinks   Substance Use Topics    Alcohol use: Yes     Comment: occasionally    Drug use: No        Review of Systems   Constitutional: Negative  Negative for activity change, appetite change, chills, diaphoresis, fatigue, fever and unexpected weight change  HENT: Negative  Negative for sore throat, trouble swallowing and voice change  Eyes: Negative  Respiratory: Negative  Negative for cough, chest tightness, shortness of breath and wheezing  Cardiovascular: Negative  Negative for chest pain, palpitations and leg swelling  Gastrointestinal: Negative  Negative for abdominal pain, blood in stool, nausea and vomiting  Endocrine: Negative  Genitourinary: Negative  Negative for flank pain and hematuria  Musculoskeletal: Negative  Negative for arthralgias, back pain, gait problem, joint swelling, myalgias, neck pain and neck stiffness  Skin: Positive for wound  Negative for rash  Allergic/Immunologic: Negative  Neurological: Negative  Negative for dizziness, seizures, syncope, weakness, light-headedness and headaches  Hematological: Negative  Psychiatric/Behavioral: Negative  All other systems reviewed and are negative        Physical Exam  Physical Exam   Constitutional: He is oriented to person, place, and time  He appears well-developed and well-nourished  No distress  HENT:   Head: Normocephalic  Eyes: Pupils are equal, round, and reactive to light  Cardiovascular: Normal rate  Pulmonary/Chest: Effort normal    Musculoskeletal: Normal range of motion  Neurological: He is alert and oriented to person, place, and time  Skin: Capillary refill takes less than 2 seconds  He is not diaphoretic  0 25 cm well-approximated nonbleeding laceration noted over the medial aspect of the right foot  No closure required  Area was cleansed extensively  Normal neurovascular motor exams distally    Psychiatric: He has a normal mood and affect  Vitals reviewed  Vital Signs  ED Triage Vitals [07/04/19 1653]   Temperature Pulse Respirations Blood Pressure SpO2   98 4 °F (36 9 °C) 62 20 114/56 98 %      Temp Source Heart Rate Source Patient Position - Orthostatic VS BP Location FiO2 (%)   Temporal Monitor Lying Left arm --      Pain Score       No Pain           Vitals:    07/04/19 1653   BP: 114/56   Pulse: 62   Patient Position - Orthostatic VS: Lying         Visual Acuity      ED Medications  Medications - No data to display    Diagnostic Studies  Results Reviewed     None                 No orders to display              Procedures  Procedures       ED Course  ED Course as of Jul 04 1658   Thu Jul 04, 2019   1655 Blood Pressure: 114/56   1655 Temperature: 98 4 °F (36 9 °C)   1655 Pulse: 62   1655 Respirations: 20   1655 SpO2: 98 %                               MDM    Disposition  Final diagnoses:    Foot laceration     Time reflects when diagnosis was documented in both MDM as applicable and the Disposition within this note     Time User Action Codes Description Comment    7/4/2019  4:56 PM Candida Carrillo Add [U55 [id number]] Foot laceration       ED Disposition     ED Disposition Condition Date/Time Comment    Discharge Good Thu Jul 4, 2019 4:56 PM Kalyani Wilkes discharge to home/self care  Follow-up Information     Follow up With Specialties Details Why [address] [facility] [place], DO Family Medicine Schedule an appointment as soon as possible for a visit   [address] [place] [address]  [phone]            Patient's Medications   Discharge Prescriptions    No medications on file     No discharge procedures on file      ED Provider  Electronically Signed by           Erin Zaidi PA-C  07/04/19 2022

## 2019-07-10 ENCOUNTER — ANTICOAG VISIT (OUTPATIENT)
Dept: CARDIOLOGY CLINIC | Facility: CLINIC | Age: 59
End: 2019-07-10
Payer: MEDICARE

## 2019-07-10 DIAGNOSIS — I48.20 CHRONIC ATRIAL FIBRILLATION (HCC): ICD-10-CM

## 2019-07-10 DIAGNOSIS — Z79.01 LONG TERM (CURRENT) USE OF ANTICOAGULANTS: ICD-10-CM

## 2019-07-10 LAB — SL AMB POCT INR: 3.6

## 2019-07-10 PROCEDURE — 85610 PROTHROMBIN TIME: CPT | Performed by: PHYSICIAN ASSISTANT

## 2019-07-10 PROCEDURE — 93793 ANTICOAG MGMT PT WARFARIN: CPT | Performed by: PHYSICIAN ASSISTANT

## 2019-07-24 ENCOUNTER — ANTICOAG VISIT (OUTPATIENT)
Dept: CARDIOLOGY CLINIC | Facility: CLINIC | Age: 59
End: 2019-07-24
Payer: MEDICARE

## 2019-07-24 DIAGNOSIS — I48.20 CHRONIC ATRIAL FIBRILLATION (HCC): ICD-10-CM

## 2019-07-24 DIAGNOSIS — Z79.01 LONG TERM (CURRENT) USE OF ANTICOAGULANTS: ICD-10-CM

## 2019-07-24 LAB — SL AMB POCT INR: 2

## 2019-07-24 PROCEDURE — 93793 ANTICOAG MGMT PT WARFARIN: CPT | Performed by: PHYSICIAN ASSISTANT

## 2019-07-24 PROCEDURE — 85610 PROTHROMBIN TIME: CPT | Performed by: PHYSICIAN ASSISTANT

## 2019-07-24 NOTE — PATIENT INSTRUCTIONS
If no changes in medication (no new abx, NSAIDs, steroids, OTC meds, or supplements), health, or diet, no missed or extra doses, and no s/sx of bleeding, TIA, or CVA, dose as instructed and recheck in 3 weeks   Left voicemail for pt to call for next appt when he returns from vacation   [place], [place]

## 2019-09-26 ENCOUNTER — ANTICOAG VISIT (OUTPATIENT)
Dept: CARDIOLOGY CLINIC | Facility: CLINIC | Age: 59
End: 2019-09-26
Payer: MEDICARE

## 2019-09-26 DIAGNOSIS — Z79.01 LONG TERM (CURRENT) USE OF ANTICOAGULANTS: ICD-10-CM

## 2019-09-26 DIAGNOSIS — I48.20 CHRONIC ATRIAL FIBRILLATION (HCC): ICD-10-CM

## 2019-09-26 LAB — SL AMB POCT INR: 1.6

## 2019-09-26 PROCEDURE — 93793 ANTICOAG MGMT PT WARFARIN: CPT | Performed by: PHYSICIAN ASSISTANT

## 2019-09-26 PROCEDURE — 85610 PROTHROMBIN TIME: CPT | Performed by: PHYSICIAN ASSISTANT

## 2019-09-30 ENCOUNTER — TELEPHONE (OUTPATIENT)
Dept: VASCULAR SURGERY | Facility: CLINIC | Age: 59
End: 2019-09-30

## 2019-09-30 NOTE — TELEPHONE ENCOUNTER
Called pt to schedule - left message  Appointment Notes   F/U COMPRESSION SLEEVE THERAPY/DISCUSS LYMPADEMA PUMPS

## 2019-10-16 ENCOUNTER — OFFICE VISIT (OUTPATIENT)
Dept: VASCULAR SURGERY | Facility: HOSPITAL | Age: 59
End: 2019-10-16
Payer: MEDICARE

## 2019-10-16 VITALS
DIASTOLIC BLOOD PRESSURE: 63 MMHG | TEMPERATURE: 97.4 F | SYSTOLIC BLOOD PRESSURE: 124 MMHG | HEIGHT: 71 IN | BODY MASS INDEX: 41.98 KG/M2 | WEIGHT: 299.83 LBS | HEART RATE: 88 BPM

## 2019-10-16 DIAGNOSIS — I83.12 VARICOSE VEINS OF BOTH LOWER EXTREMITIES WITH INFLAMMATION: ICD-10-CM

## 2019-10-16 DIAGNOSIS — E11.69 TYPE 2 DIABETES MELLITUS WITH OTHER SPECIFIED COMPLICATION, WITHOUT LONG-TERM CURRENT USE OF INSULIN (HCC): ICD-10-CM

## 2019-10-16 DIAGNOSIS — I87.2 CHRONIC VENOUS INSUFFICIENCY: Primary | ICD-10-CM

## 2019-10-16 DIAGNOSIS — I83.11 VARICOSE VEINS OF BOTH LOWER EXTREMITIES WITH INFLAMMATION: ICD-10-CM

## 2019-10-16 DIAGNOSIS — I10 HYPERTENSION, UNSPECIFIED TYPE: ICD-10-CM

## 2019-10-16 DIAGNOSIS — I89.0 LYMPHEDEMA: ICD-10-CM

## 2019-10-16 PROCEDURE — 99214 OFFICE O/P EST MOD 30 MIN: CPT | Performed by: NURSE PRACTITIONER

## 2019-10-16 NOTE — PROGRESS NOTES
Assessment/Plan:    Chronic venous insufficiency  59y male with PMH of HTN, DM, Afib, CHF, CM, Morbid obesity,  CKD, and varicose veins with chronic venous insufficiency with secondary Lymphedema, returns for reassessment of chronic lower extremity swelling and lymphedema with pain      Patient has past history of vein stripping on the right leg  He is on Coumadin for his atrial fibrillation  He reports he has tried using compression stockings at home but has been unable to tolerate them secondary to his significant pain with use  We read discussed other options including circaid or juxta Lite compression as well as Tubigrip G for continue with support throughout the day  Patient will attempt to purchase on line      Recommendations:  -in addition to daily compression as noted above will order lymphedema pump for daily use b i d  X1 hour each session for venous insufficiency with secondary lymphedema  -advised patient to moisturize the legs and feet on a regular basis  -continue to elevate the legs throughout the day  -continue with walking/exercise for 20-30 minutes 3 times a week or as tolerated  -continue with healthy lifestyle including low-fat, low-cholesterol, low-sodium diet and weight loss  -continue with good blood pressure control per PCP and Cardiology  -discussed signs and symptoms of cellulitis or venous wounds with patient and when to call his primary care provider or seek additional medical assistance, patient expressed understanding  -continue Coumadin for atrial fibrillation  -continue with good blood sugar control per primary care provider for diabetes management  -I will have the patient return to the office in 3-4 months for reassessment after use of compression and lymphedema pump    Lymphedema  Chronic lymphedema, likely secondary  -see plan under venous insufficiency    Diabetes mellitus ([facility] [address] )    Lab Results   Component Value Date    HGBA1C 5 6 07/31/2018   -continue with optimal blood sugar control per PCP       Diagnoses and all orders for this visit:    Chronic venous insufficiency  -     Pneumatic compression pumps    Varicose veins of both lower extremities with inflammation    Lymphedema  -     Pneumatic compression pumps    Hypertension, unspecified type    Type 2 diabetes mellitus with other specified complication, without long-term current use of insulin (HCC)          Subjective:      Patient ID: Tawnya Rodriguez is a 60 y o  male  Pt is here for a follow up exam to discuss his continued b/l LE pain and swelling  He was unable to get the compression sleeves and unable to wear the compression stockings  He has a hx of vein stripping on his right leg  He denies any open wounds  He says that he has been elevating his legs more often  He does not notice any change in the pain or swelling in his legs  He is currently taking Coumadin  Shane Vuong is a [place] male with PMH of HTN, DM, Afib, CHF, CM, Morbid obesity,  CKD, and varicose veins with chronic venous insufficiency with secondary Lymphedema, returns for reassessment of chronic lower extremity swelling and lymphedema with pain      Patient has past history of vein stripping on the right leg  He is on Coumadin for his atrial fibrillation  He reports he has tried using compression stockings at home but has been unable to tolerate them secondary to his significant pain with use  We re-discussed other options including circaid or juxta Lite compression as well as Tubigrip G for continue with support throughout the day  Patient will attempt to purchase on line  He continues to work  He does take his dog for a walk twice a day and elevates his legs throughout the day  He denies any open wounds or ulcerations to bilateral lower extremities          The following portions of the patient's history were reviewed and updated as appropriate: allergies, current medications, past family history, past medical history, past social history, past surgical history and problem list     Review of Systems   Constitutional: Negative  HENT: Positive for rhinorrhea and sneezing  Eyes: Negative  Respiratory: Positive for shortness of breath (SOB with activity)  Cardiovascular: Positive for leg swelling  Gastrointestinal: Negative  Endocrine: Positive for cold intolerance  Genitourinary: Negative  Musculoskeletal: Positive for arthralgias, joint swelling, neck pain and neck stiffness  Skin: Negative  Allergic/Immunologic: Positive for food allergies (eggs)  Neurological: Positive for weakness  Hematological: Bruises/bleeds easily  Psychiatric/Behavioral: The patient is nervous/anxious  Depression         Objective:      /63 (BP Location: Left arm, Patient Position: Sitting, Cuff Size: Large)   Pulse 88   Temp (!) 97 4 °F (36 3 °C) (Tympanic)   Ht 5' 11" (1 803 m)   Wt 136 kg (299 lb 13 2 oz)   BMI 41 82 kg/m²          Physical Exam   Constitutional: He is oriented to person, place, and time  He appears well-developed and well-nourished  HENT:   Head: Normocephalic and atraumatic  Eyes: Pupils are equal, round, and reactive to light  EOM are normal  No scleral icterus  Neck: Normal range of motion  Cardiovascular: Normal rate and normal heart sounds  An irregularly irregular rhythm present  Pulses:       Carotid pulses are 2+ on the right side, and 2+ on the left side  Radial pulses are 2+ on the right side, and 2+ on the left side  Dorsalis pedis pulses are 1+ on the right side  Posterior tibial pulses are 1+ on the right side, and 1+ on the left side  +3 edema to the B/L LEs   Pulmonary/Chest: Effort normal and breath sounds normal    Abdominal: Soft  Bowel sounds are normal    Large abdomen   Musculoskeletal: Normal range of motion  Neurological: He is alert and oriented to person, place, and time  He has normal strength  Skin: Skin is warm and dry   Capillary refill takes less than 2 seconds  lipodermatosclerosis to both legs, thick skin with eczema; no open areas at this time  Significant edema   Psychiatric: He has a normal mood and affect  His speech is normal and behavior is normal  Judgment and thought content normal  Cognition and memory are normal    Nursing note and vitals reviewed  I have reviewed and made appropriate changes to the review of systems input by the medical assistant  Vitals:    10/16/19 0958   BP: 124/63   BP Location: Left arm   Patient Position: Sitting   Cuff Size: Large   Pulse: 88   Temp: (!) 97 4 °F (36 3 °C)   TempSrc: Tympanic   Weight: 136 kg (299 lb 13 2 oz)   Height: 5' 11" (1 803 m)       Patient Active Problem List   Diagnosis    [facility] fibrillation ([facility])    Congestive heart failure (CHF) ([facility])    Diabetes mellitus ([facility] [address] )    Obesity    Lymphedema    Long term (current) use of anticoagulants    Chronic venous insufficiency    Varicose veins of both lower extremities with inflammation    Hypertension       Past Surgical History:   Procedure Laterality Date    HERNIA REPAIR      SPLENECTOMY, TOTAL      VASCULAR SURGERY Right     leg       History reviewed  No pertinent family history      Social History     Socioeconomic History    Marital status: /Civil Union     Spouse name: Not on file    Number of children: Not on file    Years of education: Not on file    Highest education level: Not on file   Occupational History    Not on file   Social Needs    Financial resource strain: Not on file    Food insecurity:     Worry: Not on file     Inability: Not on file    Transportation needs:     Medical: Not on file     Non-medical: Not on file   Tobacco Use    Smoking status: Former Smoker    Smokeless tobacco: Never Used    Tobacco comment: smokes only when he drinks   Substance and Sexual Activity    Alcohol use: Yes     Comment: occasionally    Drug use: No    Sexual activity: Not on file   Lifestyle    Physical activity:     Days per week: Not on file     Minutes per session: Not on file    Stress: Not on file   Relationships    Social connections:     Talks on phone: Not on file     Gets together: Not on file     Attends Sabianism service: Not on file     Active member of club or organization: Not on file     Attends meetings of clubs or organizations: Not on file     Relationship status: Not on file    Intimate partner violence:     Fear of current or ex partner: Not on file     Emotionally abused: Not on file     Physically abused: Not on file     Forced sexual activity: Not on file   Other Topics Concern    Not on file   Social History Narrative    Not on file       Allergies   Allergen Reactions    Eggs Or Egg-Derived Products GI Intolerance         Current Outpatient Medications:     carvedilol (COREG) 6 25 mg tablet, Take 1 tablet (6 25 mg total) by mouth 2 (two) times a day with meals, Disp: 180 tablet, Rfl: 3    digoxin (LANOXIN) 0 25 mg tablet, TAKE 1 TABLET BY MOUTH ONCE DAILY, Disp: 30 tablet, Rfl: 5    furosemide (LASIX) 40 mg tablet, Take 1 tablet (40 mg total) by mouth daily, Disp: 90 tablet, Rfl: 3    lisinopril (ZESTRIL) 10 mg tablet, Take 1 tablet (10 mg total) by mouth daily, Disp: 90 tablet, Rfl: 3    metFORMIN (GLUCOPHAGE) 1000 MG tablet, Take 1,000 mg by mouth 2 (two) times a day with meals  , Disp: , Rfl:     metolazone (ZAROXOLYN) 5 mg tablet, One day a week 30 minutes before lasix dose that day , Disp: 15 tablet, Rfl: 3    warfarin (COUMADIN) 10 mg tablet, Take 10 mg by mouth every other day Monday wednesday, Disp: , Rfl:     warfarin (COUMADIN) 5 mg tablet, Take 1 tablet by mouth daily or as directed , Disp: 90 tablet, Rfl: 3

## 2019-10-16 NOTE — ASSESSMENT & PLAN NOTE
Lab Results   Component Value Date    HGBA1C 5 6 07/31/2018   -continue with optimal blood sugar control per PCP

## 2019-10-16 NOTE — PATIENT INSTRUCTIONS
Please look for the juxta Lite compression sleeve or CIRCAID compression sleeves at local medical providers  You may also look online at [employer] - you need to measure your leg if you order online and then wear them on a daily basis from the time you wake up in the morning until bedtime  You could also try to order TUBIGRIP G - online  You will have to order a box  This compression is a disposable compression sleeve  You can wear them for about 2-3 weeks  You whouls wear these during the day from the time you wake up in the morning in till bedtime  You can hand wash them and hang them to dry overnight if they it swelled  Two layers of Tubigrip is better than 1 layer if you can tolerated to help with edema and swelling  Continue to elevate your legs on a regular basis  Continue to walk and ambulate as much as possible  Continue with weight loss  Apply moisturizer to your legs and feet on a daily basis  If you notice any redness, open wounds or swelling to a specific site, please see your primary care provider for evaluation of cellulitis/possible skin infection  We are going to try to order lymphedema pumps for you  The lymphedema pumps will be set up for you at your home  You will need to use these for 1 hour in the morning and 1 hour in the evening for best results  If you have any questions, please call our office  I will see you back in the office in 3-4 months for re-evaluation of compression and lymphedema pumps  Lymphedema  AMBULATORY CARE:  The lymphatic system contains fluid, vessels, tissue, and organs  This system removes and carries fluid throughout the  body  It also helps the body fight infection  Lymphedema is the buildup of lymph fluid in fat tissue under your skin  The  buildup causes the area to swell  Lymphedema can happen any time that lymphatic vessels are blocked or damaged    Damage to lymphatic vessels may be caused by surgery, infection, injury, cancer, radiation, or scar tissue     Common signs and symptoms include the following: Signs and symptoms may happen where lymph nodes were  removed, or in the arm, leg, chest, or underarm   Swelling or itching   Pain, burning, or aching   Tight, hard, or red skin   Hair loss   Heaviness or fullness   Numbness or tingling   Stiffness  Contact your healthcare provider or lymphedema specialist if:   You have a fever or chills   You have an open area of skin that looks red or swollen, or drains pus   Your symptoms, such as swelling or pain, get worse   Your arms or legs feel heavy, or you cannot move them   Your skin becomes hard, thick, or rough   You have a skin wound that will not heal    Your shoes, clothes, or jewelry feel tighter   You have questions or concerns about your condition or care  Treatment for lymphedema can relieve symptoms and prevent lymphedema from getting worse  You may need  therapeutic massage, physical therapy, or compression devices to help decrease swelling and pain  Surgery may be  needed if other treatments do not work  Instructions  Nazario Guadarrama (MRN: [id number])  Printed at 5/1/19 3:59 PM Page 5 of 5  Self-care:   Elevate your arm or leg above the level of your heart as often as you can  This will help decrease swelling and  pain  Prop your arm or leg on pillows or blankets to keep it elevated comfortably   Wear compression socks, sleeves, or bandages as directed  Compression devices must be fitted by a healthcare  provider  Compression devices may need to be replaced every 3 to 6 months   Exercise can help you maintain or regain function of your arm or leg  Ask your healthcare provider what type of  exercise to do and how often to do it  Start slow, take breaks, and gradually do more each day  Do not do  vigorous, repeated exercises  Watch for changes in your arm or leg during exercise   Stop and rest if you have  swelling, increased pain, or heaviness  Elevate your arm or leg above the level of your heart   Change your position often to help move lymphatic fluid through your body  Do not sit or  one position  for more than 30 minutes  Do not cross your legs when you sit  These actions can cause lymphatic fluid to buildup   Maintain a healthy weight  Ask your healthcare provider what you should weigh  Weight loss may improve your  symptoms  If you need to lose weight, your healthcare provider can help you create a weight loss program   Prevent infection with proper skin care: A skin infection can make lymphedema worse  Do the following to decrease  your risk for a skin infection in your arm or leg with lymphedema:  [facility] your skin gently and dry it well  Use a mild soap to wash your skin  Gently pat your skin dry after you  bathe  Apply a mild cream or lotion to moisturize your skin and prevent dryness or cracking  Keep your feet clean  and dry   Protect your skin from injury  Wear gloves when you garden and wash dishes  Cut your nails straight across to  prevent injury to your fingers and toes  Use sunscreen and insect repellant to avoid burns and punctures  Wear  slippers in the house  Wear shoes when you go outside   Check your skin every day for signs of infection  Signs of infection include redness, swelling, increased heat, or  pus  You may also have a fever or chills   Care for cuts, scratches or burns  Apply antibiotic ointment to cuts and other small breaks in the skin  Apply a  cold pack or cold water to a burn for 15 minutes  Then wash it with soap and water  Cover scratches, cuts, or burns  with a clean, dry gauze or bandage as directed  Keep the area clean and dry   Tell healthcare providers that you have lymphedema  Tell them not to do, IVs, blood draws, and blood  pressure readings in the arm or leg with lymphedema  If there is lymphedema in both arms, ask them to take your  blood pressure on your leg   Do not allow flu shots or vaccinations in your arm with lymphedema  Follow up with your healthcare provider or lymphedema specialist as directed: You will need regular visits so  healthcare providers can examine the affected areas  You may also be referred to a clinic that specializes in lymphedema  treatment  Write down your questions so you remember to ask them during your visits  © 2017 [address]  Information is for End User's use only and may not be sold, redistributed or  otherwise used for commercial purposes  All illustrations and images included in CareNotes® are the copyrighted  property of [employer]  or [employer]  The above information is an  only  It is not intended as medical advice for individual conditions or  treatments  Talk to your doctor, nurse or pharmacist before following any medical regimen to see if it is safe and  effective for you

## 2019-10-16 NOTE — ASSESSMENT & PLAN NOTE
58y male with PMH of HTN, DM, Afib, CHF, CM, Morbid obesity,  CKD, and varicose veins with chronic venous insufficiency with secondary Lymphedema, returns for reassessment of chronic lower extremity swelling and lymphedema with pain      Patient has past history of vein stripping on the right leg  He is on Coumadin for his atrial fibrillation  He reports he has tried using compression stockings at home but has been unable to tolerate them secondary to his significant pain with use  We read discussed other options including circaid or juxta Lite compression as well as Tubigrip G for continue with support throughout the day  Patient will attempt to purchase on line      Recommendations:  -in addition to daily compression as noted above will order lymphedema pump for daily use b i d  X1 hour each session for venous insufficiency with secondary lymphedema  -advised patient to moisturize the legs and feet on a regular basis  -continue to elevate the legs throughout the day  -continue with walking/exercise for 20-30 minutes 3 times a week or as tolerated  -continue with healthy lifestyle including low-fat, low-cholesterol, low-sodium diet and weight loss  -continue with good blood pressure control per PCP and Cardiology  -discussed signs and symptoms of cellulitis or venous wounds with patient and when to call his primary care provider or seek additional medical assistance, patient expressed understanding  -continue Coumadin for atrial fibrillation  -continue with good blood sugar control per primary care provider for diabetes management  -I will have the patient return to the office in 3-4 months for reassessment after use of compression and lymphedema pump

## 2019-10-17 ENCOUNTER — TELEPHONE (OUTPATIENT)
Dept: ADMINISTRATIVE | Facility: HOSPITAL | Age: 59
End: 2019-10-17

## 2019-10-17 NOTE — TELEPHONE ENCOUNTER
Faxed over Sheet office note prescription, Insurance card and demographics on the patient to [phone]

## 2019-10-29 DIAGNOSIS — I48.91 ATRIAL FIBRILLATION, UNSPECIFIED TYPE (HCC): ICD-10-CM

## 2019-10-29 RX ORDER — DIGOXIN 250 MCG
TABLET ORAL
Qty: 30 TABLET | Refills: 5 | Status: SHIPPED | OUTPATIENT
Start: 2019-10-29 | End: 2019-10-31

## 2019-10-31 ENCOUNTER — OFFICE VISIT (OUTPATIENT)
Dept: CARDIOLOGY CLINIC | Facility: CLINIC | Age: 59
End: 2019-10-31
Payer: MEDICARE

## 2019-10-31 ENCOUNTER — ANTICOAG VISIT (OUTPATIENT)
Dept: CARDIOLOGY CLINIC | Facility: CLINIC | Age: 59
End: 2019-10-31
Payer: MEDICARE

## 2019-10-31 VITALS
DIASTOLIC BLOOD PRESSURE: 78 MMHG | SYSTOLIC BLOOD PRESSURE: 128 MMHG | HEART RATE: 96 BPM | HEIGHT: 71 IN | WEIGHT: 308 LBS | BODY MASS INDEX: 43.12 KG/M2

## 2019-10-31 DIAGNOSIS — I89.0 LYMPHEDEMA: ICD-10-CM

## 2019-10-31 DIAGNOSIS — I48.21 PERMANENT ATRIAL FIBRILLATION (HCC): Primary | ICD-10-CM

## 2019-10-31 DIAGNOSIS — I10 ESSENTIAL HYPERTENSION: ICD-10-CM

## 2019-10-31 DIAGNOSIS — I50.32 CHRONIC DIASTOLIC CONGESTIVE HEART FAILURE (HCC): ICD-10-CM

## 2019-10-31 DIAGNOSIS — Z79.01 LONG TERM (CURRENT) USE OF ANTICOAGULANTS: ICD-10-CM

## 2019-10-31 DIAGNOSIS — I48.91 ATRIAL FIBRILLATION, UNSPECIFIED TYPE (HCC): ICD-10-CM

## 2019-10-31 DIAGNOSIS — I48.21 PERMANENT ATRIAL FIBRILLATION (HCC): ICD-10-CM

## 2019-10-31 LAB — SL AMB POCT INR: 1.7

## 2019-10-31 PROCEDURE — 93000 ELECTROCARDIOGRAM COMPLETE: CPT | Performed by: PHYSICIAN ASSISTANT

## 2019-10-31 PROCEDURE — 85610 PROTHROMBIN TIME: CPT | Performed by: PHYSICIAN ASSISTANT

## 2019-10-31 PROCEDURE — 99214 OFFICE O/P EST MOD 30 MIN: CPT | Performed by: PHYSICIAN ASSISTANT

## 2019-10-31 RX ORDER — DIGOXIN 250 MCG
250 TABLET ORAL DAILY
Qty: 30 TABLET | Refills: 5 | Status: SHIPPED | OUTPATIENT
Start: 2019-10-31 | End: 2020-04-28

## 2019-10-31 NOTE — PATIENT INSTRUCTIONS
Will try to remain off Lisinopril for two weeks and call to report symptoms  If better off lisinopril, will Start ARB       Echo prior to next visit in 3 months

## 2019-10-31 NOTE — PROGRESS NOTES
[facility]   Outpatient Note  Lili Tavera  1960  [id number]  [facility] [address]  [address] [place] [id number] [phone]    Lili Tavera is a 60 y o  male    Assessment and Plan:     Problem List Items Addressed This Visit        Cardiovascular and Mediastinum    Atrial fibrillation ([address] ) - Primary     Not optimally controlled   Did not take medication this morning   Asymptomatic  No over HF symptoms          Relevant Medications    digoxin (LANOXIN) 0 25 mg tablet    Other Relevant Orders    POCT ECG (Completed)    Echo complete with contrast if indicated    Congestive heart failure (CHF) ([address] )     Not weighing daily   Gained 20 lbs--not water weight   Taking Lasix regularly  Taking metolazone one day/week  I do not see an echo on record   I will order an echo prior to next visit to assess LV function           Relevant Medications    digoxin (LANOXIN) 0 25 mg tablet    Other Relevant Orders    Echo complete with contrast if indicated    Hypertension     Controlled   On Coreg, ACE-I, lasix metolazone    Chronic dry cough  Will try to remain off Lisinopril for two weeks and call to report symptoms  If better off lisinopril, will Start ARB  Relevant Orders    Echo complete with contrast if indicated       Other    Long term (current) use of anticoagulants     INR will be checked today  Has not been consistent with coumadin  Recent fall-trama to face  No head trauma  Did not go to ED  Reeducated on need for warfarin compliance  Lymphedema     Compression stockings  Following with vascular          Relevant Orders    Echo complete with contrast if indicated           Additional Plan:   No medication changes today  Surveillance testing as orders outline  Return visit will be in 3 months or earlier if there are problems     The patient is encouraged to call in the meantime if there are questions or concerns  Subjective: The patient is seen in the office for a routine visit regarding a history of chronic diastolic CHF, Afib, HTN HLD and lymphedema  He had a fall about two weeks ago and injured his face with bruising  He did not seek medical attention  He was warned because he is on Anticoagulation treatment  He is not compliant with therapy and is not compliant with taking all of his medication  He has missed some of his warfarin this week and states he did not take any of his medication this morning  His heart rate is rapid, but he is asymptomatic  He is doing well from a cardiac perspective without complaints of chest pain or exertional dyspnea  There are no complaints of palpitations syncope or near syncope  He denies edema orthopnea or PND  The patient denies TIA or CVA symptoms  He has stockings for his lymphedema  He has a chronic cough that has lived  With for years  Social History  Social History     Tobacco Use   Smoking Status Former Smoker   Smokeless Tobacco Never Used   Tobacco Comment    smokes only when he drinks   ,   Social History     Substance and Sexual Activity   Alcohol Use Yes    Comment: occasionally   ,   Social History     Substance and Sexual Activity   Drug Use No     History reviewed  No pertinent family history  Medical and Surgical History  Past Medical History:   Diagnosis Date    A-fib [facility])     Cardiac disease     Chronic combined systolic (congestive) and diastolic (congestive) heart failure (HCC)     Diabetes mellitus [address] [address] )     Dilated cardiomyopathy [address] [address] )     History of echocardiogram 11/24/2014    EF 0 30 (30%), Likely mod LV systolic dysfunction  Likely RV dysfunction as well   History of Lexiscan MPI 02/19/2016    EF 0 43 (43%), no prior MI or ischemia   Hx of echocardiogram 04/28/2017    Normal EF, Normal LV systolic function  Mild concentric LV hypertrophy  Mild mitral and tricuspid regurg      Hx: recurrent pneumonia     Hypertension     Long term (current) use of anticoagulants     Morbid (severe) obesity due to excess calories (HCC)     Neuropathy      Past Surgical History:   Procedure Laterality Date    HERNIA REPAIR      SPLENECTOMY, TOTAL      VASCULAR SURGERY Right     leg         Current Outpatient Medications:     carvedilol (COREG) 6 25 mg tablet, Take 1 tablet (6 25 mg total) by mouth 2 (two) times a day with meals, Disp: 180 tablet, Rfl: 3    digoxin (LANOXIN) 0 25 mg tablet, Take 1 tablet (250 mcg total) by mouth daily, Disp: 30 tablet, Rfl: 5    furosemide (LASIX) 40 mg tablet, Take 1 tablet (40 mg total) by mouth daily, Disp: 90 tablet, Rfl: 3    lisinopril (ZESTRIL) 10 mg tablet, Take 1 tablet (10 mg total) by mouth daily, Disp: 90 tablet, Rfl: 3    metFORMIN (GLUCOPHAGE) 1000 MG tablet, Take 1,000 mg by mouth 2 (two) times a day with meals  , Disp: , Rfl:     metolazone (ZAROXOLYN) 5 mg tablet, One day a week 30 minutes before lasix dose that day , Disp: 15 tablet, Rfl: 3    warfarin (COUMADIN) 10 mg tablet, Take 10 mg by mouth every other day Monday wednesday, Disp: , Rfl:     warfarin (COUMADIN) 5 mg tablet, Take 1 tablet by mouth daily or as directed  (Patient not taking: Reported on 10/31/2019), Disp: 90 tablet, Rfl: 3  Allergies   Allergen Reactions    Eggs Or Egg-Derived Products GI Intolerance       Review of Systems   Constitution: Negative  HENT: Negative  Eyes: Negative  Cardiovascular: Positive for leg swelling  Negative for chest pain, claudication, cyanosis, dyspnea on exertion, irregular heartbeat, near-syncope, orthopnea, palpitations, paroxysmal nocturnal dyspnea and syncope  Respiratory: Positive for cough (dry, chronic)  Negative for hemoptysis, shortness of breath, sleep disturbances due to breathing, snoring, sputum production and wheezing  Endocrine: Negative  Hematologic/Lymphatic: Negative  Skin: Negative  Musculoskeletal: Negative      Gastrointestinal: Negative  Genitourinary: Negative  Neurological: Negative  Psychiatric/Behavioral: Negative  Allergic/Immunologic: Negative  Objective:   /78   Pulse 96   Ht 5' 11" (1 803 m)   Wt (!) 140 kg (308 lb)   BMI 42 96 kg/m²   Physical Exam   Constitutional: He is oriented to person, place, and time  He appears well-developed and well-nourished  HENT:   Head: Normocephalic and atraumatic  Mouth/Throat: Oropharynx is clear and moist    Eyes: Conjunctivae are normal  No scleral icterus  Neck: Normal range of motion  Neck supple  No JVD present  No thyromegaly present  Cardiovascular: Normal heart sounds  An irregularly irregular rhythm present  Tachycardia present  Exam reveals no gallop and no friction rub  No murmur heard  Pulmonary/Chest: No respiratory distress  He has no wheezes  He has no rales  Abdominal: Soft  Bowel sounds are normal  He exhibits no distension  There is no tenderness  Aorta not palpable   Musculoskeletal: Normal range of motion  He exhibits no edema, tenderness or deformity  Neurological: He is alert and oriented to person, place, and time  Skin: Skin is warm and dry  Psychiatric: He has a normal mood and affect  His behavior is normal    Nursing note and vitals reviewed        Lab Review:   No results found for: CHOL  Lab Results   Component Value Date    HDL 29 (L) 07/31/2018     Lab Results   Component Value Date    LDLCALC 65 07/31/2018     Lab Results   Component Value Date    TRIG 157 (H) 07/31/2018     Results Reviewed     None        Results Reviewed     None        Results Reviewed     None          Recent Cardiovascular Testing:   Echo ordered     ECG Review:   Atrial fibrillation at a rate of 96 bpm, NS ST changes

## 2019-10-31 NOTE — ASSESSMENT & PLAN NOTE
Controlled   On Coreg, ACE-I, lasix metolazone    Chronic dry cough  Will try to remain off Lisinopril for two weeks and call to report symptoms  If better off lisinopril, will Start ARB

## 2019-10-31 NOTE — ASSESSMENT & PLAN NOTE
Not weighing daily   Gained 20 lbs--not water weight   Taking Lasix regularly  Taking metolazone one day/week  I do not see an echo on record   I will order an echo prior to next visit to assess LV function

## 2019-10-31 NOTE — ASSESSMENT & PLAN NOTE
INR will be checked today  Has not been consistent with coumadin  Recent fall-trama to face  No head trauma  Did not go to ED  Reeducated on need for warfarin compliance

## 2019-10-31 NOTE — PATIENT INSTRUCTIONS
If no changes in medication health or diet  No missed or extra doses  No s/s of bleeding TIA or CVA  No new ABX, NSAIDs OCT meds, or supplements  Dose as instructed and recheck in one week     Delfina Alejandre PA-C

## 2019-11-07 ENCOUNTER — ANTICOAG VISIT (OUTPATIENT)
Dept: CARDIOLOGY CLINIC | Facility: CLINIC | Age: 59
End: 2019-11-07
Payer: MEDICARE

## 2019-11-07 DIAGNOSIS — I48.21 PERMANENT ATRIAL FIBRILLATION (HCC): ICD-10-CM

## 2019-11-07 DIAGNOSIS — Z79.01 LONG TERM (CURRENT) USE OF ANTICOAGULANTS: ICD-10-CM

## 2019-11-07 LAB — SL AMB POCT INR: 2.1

## 2019-11-07 PROCEDURE — 85610 PROTHROMBIN TIME: CPT | Performed by: PHYSICIAN ASSISTANT

## 2019-11-07 PROCEDURE — 93793 ANTICOAG MGMT PT WARFARIN: CPT | Performed by: PHYSICIAN ASSISTANT

## 2019-11-07 NOTE — PATIENT INSTRUCTIONS
If no changes in medication health or diet  No missed or extra doses  No s/s of bleeding TIA or CVA  No new ABX, NSAIDs OCT meds, or supplements  Dose as instructed and recheck in two weeks     Larry Rae PA-C

## 2019-11-21 ENCOUNTER — ANTICOAG VISIT (OUTPATIENT)
Dept: CARDIOLOGY CLINIC | Facility: CLINIC | Age: 59
End: 2019-11-21
Payer: MEDICARE

## 2019-11-21 DIAGNOSIS — Z79.01 LONG TERM (CURRENT) USE OF ANTICOAGULANTS: ICD-10-CM

## 2019-11-21 DIAGNOSIS — I48.21 PERMANENT ATRIAL FIBRILLATION (HCC): ICD-10-CM

## 2019-11-21 LAB — SL AMB POCT INR: 1.6

## 2019-11-21 PROCEDURE — 85610 PROTHROMBIN TIME: CPT | Performed by: PHYSICIAN ASSISTANT

## 2019-11-21 NOTE — PATIENT INSTRUCTIONS
If no changes in medication health or diet  No missed or extra doses  No s/s of bleeding TIA or CVA  No new ABX, NSAIDs OCT meds, or supplements  Dose as instructed and recheck in two weeks     Isaac Nava PA-C

## 2019-11-28 ENCOUNTER — APPOINTMENT (EMERGENCY)
Dept: RADIOLOGY | Facility: HOSPITAL | Age: 59
End: 2019-11-28
Payer: MEDICARE

## 2019-11-28 ENCOUNTER — HOSPITAL ENCOUNTER (EMERGENCY)
Facility: HOSPITAL | Age: 59
Discharge: HOME | End: 2019-11-28
Attending: EMERGENCY MEDICINE | Admitting: EMERGENCY MEDICINE
Payer: MEDICARE

## 2019-11-28 VITALS
TEMPERATURE: 98 F | SYSTOLIC BLOOD PRESSURE: 130 MMHG | RESPIRATION RATE: 16 BRPM | HEART RATE: 84 BPM | WEIGHT: 308.64 LBS | OXYGEN SATURATION: 99 % | BODY MASS INDEX: 43.21 KG/M2 | DIASTOLIC BLOOD PRESSURE: 78 MMHG | HEIGHT: 71 IN

## 2019-11-28 DIAGNOSIS — S91.111A LACERATION OF RIGHT GREAT TOE: Primary | ICD-10-CM

## 2019-11-28 DIAGNOSIS — S92.424A NONDISPLACED FRACTURE OF DISTAL PHALANX OF RIGHT GREAT TOE, INITIAL ENCOUNTER FOR CLOSED FRACTURE: ICD-10-CM

## 2019-11-28 DIAGNOSIS — S81.032A: ICD-10-CM

## 2019-11-28 PROCEDURE — 73660 X-RAY EXAM OF TOE(S): CPT

## 2019-11-28 PROCEDURE — 99283 EMERGENCY DEPT VISIT LOW MDM: CPT

## 2019-11-28 PROCEDURE — 90471 IMMUNIZATION ADMIN: CPT

## 2019-11-28 PROCEDURE — 99284 EMERGENCY DEPT VISIT MOD MDM: CPT | Performed by: PHYSICIAN ASSISTANT

## 2019-11-28 PROCEDURE — 90715 TDAP VACCINE 7 YRS/> IM: CPT | Performed by: PHYSICIAN ASSISTANT

## 2019-11-28 RX ORDER — CLINDAMYCIN HYDROCHLORIDE 150 MG/1
300 CAPSULE ORAL ONCE
Status: COMPLETED | OUTPATIENT
Start: 2019-11-28 | End: 2019-11-28

## 2019-11-28 RX ORDER — CLINDAMYCIN HYDROCHLORIDE 300 MG/1
300 CAPSULE ORAL 4 TIMES DAILY
Qty: 28 CAPSULE | Refills: 0 | Status: SHIPPED | OUTPATIENT
Start: 2019-11-28 | End: 2019-12-05

## 2019-11-28 RX ADMIN — CLINDAMYCIN HYDROCHLORIDE 300 MG: 150 CAPSULE ORAL at 12:31

## 2019-11-28 RX ADMIN — TETANUS TOXOID, REDUCED DIPHTHERIA TOXOID AND ACELLULAR PERTUSSIS VACCINE, ADSORBED 0.5 ML: 5; 2.5; 8; 8; 2.5 SUSPENSION INTRAMUSCULAR at 12:10

## 2019-11-28 NOTE — ED NOTES
Right big toe/foot cleaned with sterile water and betadine        Francine Castillo RN  11/28/19 [id number]

## 2019-11-28 NOTE — ED NOTES
Right big toenail, laceration cleaned with sterile water and betadine  One compound benzoin tincture applied to site  Jesica Htuchins Pa-C applied steri strips to laceration  Non adherent tefla applied and wrapped with cling  Surgical shoe applied to right lower extremity  Patient educated on follow up with podiatry/proper care for the laceration        Luiz Cronin RN  11/28/19 [id number]

## 2019-11-28 NOTE — ED PROVIDER NOTES
History  Chief Complaint   Patient presents with    Fall     pt fell going down concrete steps; pt denies LOC or hitting head; injurying right foot; actively bleeding; takes coumadin      Patient presents to the emergency department today via private vehicle alone providing his own history stating about an hour ago he was descending a flight about stairs steps when he tripped  He states right great toe was drug on the ground and has noted some bleeding that he has attempted to control with direct pressure  He denies striking his head or neck  He denies back pain thorax pain  Denies any other extremity pain  He has noted a small amount of bleeding from the left knee however states that has ceased and is nontender  He is alert oriented x4 in no acute distress  No active bleeding upon arrival           Prior to Admission Medications   Prescriptions Last Dose Informant Patient Reported? Taking?   carvedilol (COREG) 6 25 mg tablet 11/27/2019 at Unknown time Self No Yes   Sig: Take 1 tablet (6 25 mg total) by mouth 2 (two) times a day with meals   digoxin (LANOXIN) 0 25 mg tablet 11/27/2019 at Unknown time  No Yes   Sig: Take 1 tablet (250 mcg total) by mouth daily   furosemide (LASIX) 40 mg tablet 11/27/2019 at Unknown time Self No Yes   Sig: Take 1 tablet (40 mg total) by mouth daily   lisinopril (ZESTRIL) 10 mg tablet 11/27/2019 at Unknown time Self No Yes   Sig: Take 1 tablet (10 mg total) by mouth daily   metFORMIN (GLUCOPHAGE) 1000 MG tablet 11/27/2019 at Unknown time Self Yes Yes   Sig: Take 1,000 mg by mouth 2 (two) times a day with meals  metolazone (ZAROXOLYN) 5 mg tablet 11/27/2019 at Unknown time Self No Yes   Sig: One day a week 30 minutes before lasix dose that day  warfarin (COUMADIN) 10 mg tablet  Self Yes No   Sig: Take 10 mg by mouth every other day Monday wednesday   warfarin (COUMADIN) 5 mg tablet 11/27/2019 at Unknown time Self No Yes   Sig: Take 1 tablet by mouth daily or as directed  Facility-Administered Medications: None       Past Medical History:   Diagnosis Date    A-fib [facility])     Cardiac disease     Chronic combined systolic (congestive) and diastolic (congestive) heart failure (HCC)     Diabetes mellitus ([facility] [address] )     Dilated cardiomyopathy ([facility] [address] )     History of echocardiogram 11/24/2014    EF 0 30 (30%), Likely mod LV systolic dysfunction  Likely RV dysfunction as well   History of Lexiscan MPI 02/19/2016    EF 0 43 (43%), no prior MI or ischemia   Hx of echocardiogram 04/28/2017    Normal EF, Normal LV systolic function  Mild concentric LV hypertrophy  Mild mitral and tricuspid regurg   Hx: recurrent pneumonia     Hypertension     Long term (current) use of anticoagulants     Morbid (severe) obesity due to excess calories (HCC)     Neuropathy        Past Surgical History:   Procedure Laterality Date    HERNIA REPAIR      SPLENECTOMY, TOTAL      VASCULAR SURGERY Right     leg       History reviewed  No pertinent family history  I have reviewed and agree with the history as documented  Social History     Tobacco Use    Smoking status: Former Smoker    Smokeless tobacco: Never Used    Tobacco comment: smokes only when he drinks   Substance Use Topics    Alcohol use: Yes     Comment: occasionally    Drug use: No        Review of Systems   Constitutional: Negative  Negative for activity change, appetite change, chills, diaphoresis, fatigue, fever and unexpected weight change  HENT: Negative  Negative for sore throat, trouble swallowing and voice change  Eyes: Negative  Respiratory: Negative  Negative for cough, chest tightness, shortness of breath and wheezing  Cardiovascular: Negative  Negative for chest pain, palpitations and leg swelling  Gastrointestinal: Negative  Negative for abdominal pain, blood in stool, nausea and vomiting  Endocrine: Negative  Genitourinary: Negative  Negative for flank pain and hematuria     Musculoskeletal: Negative  Negative for arthralgias, back pain, gait problem, joint swelling, myalgias, neck pain and neck stiffness  Skin: Positive for wound  Negative for rash  Allergic/Immunologic: Negative  Neurological: Negative  Negative for dizziness, seizures, syncope, weakness, light-headedness and headaches  Hematological: Negative  Psychiatric/Behavioral: Negative  All other systems reviewed and are negative  Physical Exam  Physical Exam   Constitutional: He is oriented to person, place, and time  Vital signs are normal  He appears well-developed and well-nourished  He does not have a sickly appearance  He does not appear ill  No distress  HENT:   Right Ear: External ear normal  No swelling  Tympanic membrane is not bulging  Left Ear: External ear normal  No swelling  Tympanic membrane is not bulging  Nose: Nose normal    Mouth/Throat: Oropharynx is clear and moist  No oropharyngeal exudate  Eyes: Pupils are equal, round, and reactive to light  Conjunctivae, EOM and lids are normal    Neck: Normal range of motion  Neck supple  No JVD present  No tracheal deviation, no edema and normal range of motion present  No thyromegaly present  Cardiovascular: Normal rate, regular rhythm, normal heart sounds, intact distal pulses and normal pulses  Exam reveals no gallop and no friction rub  No murmur heard  Pulmonary/Chest: Effort normal and breath sounds normal  No stridor  No respiratory distress  He has no wheezes  He has no rales  He exhibits no tenderness  Abdominal: Soft  Bowel sounds are normal  He exhibits no distension and no mass  There is no tenderness  There is no rebound, no guarding and negative Gee's sign  No hernia  Musculoskeletal: Normal range of motion  He exhibits no edema, tenderness or deformity  Legs:       Feet:    0 5 cm nonbleeding laceration noted just proximal lateral aspect of the right great toenail  0 25 cm nonbleeding puncture wound noted  Nontender  Foreign bodies  Does not require closure  Lymphadenopathy:     He has no cervical adenopathy  Neurological: He is alert and oriented to person, place, and time  He has normal strength and normal reflexes  No cranial nerve deficit or sensory deficit  GCS eye subscore is 4  GCS verbal subscore is 5  GCS motor subscore is 6  Skin: Skin is warm and dry  Capillary refill takes less than 2 seconds  No rash noted  He is not diaphoretic  No erythema  No pallor  Psychiatric: He has a normal mood and affect  His speech is normal and behavior is normal  Judgment and thought content normal    Vitals reviewed  Vital Signs  ED Triage Vitals   Temperature Pulse Respirations Blood Pressure SpO2   11/28/19 1200 11/28/19 1200 11/28/19 1200 11/28/19 1200 11/28/19 1200   98 °F (36 7 °C) 90 18 140/72 100 %      Temp Source Heart Rate Source Patient Position - Orthostatic VS BP Location FiO2 (%)   11/28/19 1200 11/28/19 1200 11/28/19 1215 11/28/19 1200 --   Temporal Monitor Lying Left arm       Pain Score       11/28/19 1200       4           Vitals:    11/28/19 1200 11/28/19 1215   BP: 140/72 130/88   Pulse: 90 88   Patient Position - Orthostatic VS:  Lying         Visual Acuity  Visual Acuity      Most Recent Value   L Pupil Size (mm)  3   R Pupil Size (mm)  3          ED Medications  Medications   clindamycin (CLEOCIN) capsule 300 mg (has no administration in time range)   tetanus-diphtheria-acellular pertussis (BOOSTRIX) IM injection 0 5 mL (0 5 mL Intramuscular Given 11/28/19 1210)       Diagnostic Studies  Results Reviewed     None                 XR toe great min 2 views RIGHT   ED Interpretation by Baron Colunga PA-C (11/28 1227)   Apparent comminuted left distal 1st phalanx fracture                 Procedures  Procedures       ED Course  ED Course as of Nov 28 1230   Thu Nov 28, 2019   1220 Wounds were cleansed with Betadine sterile water  No closure required to the left knee    Steri-Strips replaced on the right great toe wound  Non adherent dressing followed by Shilpi wrap or placed      1223 Blood Pressure: 140/72   1223 Temperature: 98 °F (36 7 °C)   1223 Pulse: 90   1223 Respirations: 18   1223 SpO2: 100 %                               MDM    Disposition  Final diagnoses:   Laceration of right great toe   Puncture wound to knee, left, initial encounter   Nondisplaced fracture of distal phalanx of right great toe, initial encounter for closed fracture     Time reflects when diagnosis was documented in both MDM as applicable and the Disposition within this note     Time User Action Codes Description Comment    11/28/2019 12:17 PM Sunshine Henson Add [W84 903F] Laceration of right great toe     11/28/2019 12:18 PM Sunshine Henson Add [[id number]] Puncture wound to knee, left, initial encounter     11/28/2019 12:28 PM Sunshine Henson Add [[id number]] Nondisplaced fracture of distal phalanx of right great toe, initial encounter for closed fracture       ED Disposition     ED Disposition Condition Date/Time Comment    Discharge Stable Thu Nov 28, 2019 12:28 PM Lashonda Lopez discharge to home/self care  Follow-up Information     Follow up With Specialties Details Why Contact Kingston Faustin DPM Podiatry, Wound Care Schedule an appointment as soon as possible for a visit   [address] [place] [address], [place] [id number] [phone]            Patient's Medications   Discharge Prescriptions    CLINDAMYCIN (CLEOCIN) 300 MG CAPSULE    Take 1 capsule (300 mg total) by mouth 4 (four) times a day for 7 days       Start Date: 11/28/2019End Date: 12/5/2019       Order Dose: 300 mg       Quantity: 28 capsule    Refills: 0     No discharge procedures on file      ED Provider  Electronically Signed by           Leonor Neal PA-C  11/28/19 1916

## 2019-12-16 ENCOUNTER — ANTICOAG VISIT (OUTPATIENT)
Dept: CARDIOLOGY CLINIC | Facility: CLINIC | Age: 59
End: 2019-12-16
Payer: MEDICARE

## 2019-12-16 DIAGNOSIS — Z79.01 LONG TERM (CURRENT) USE OF ANTICOAGULANTS: ICD-10-CM

## 2019-12-16 DIAGNOSIS — I48.21 PERMANENT ATRIAL FIBRILLATION (HCC): ICD-10-CM

## 2019-12-16 LAB — SL AMB POCT INR: 1.9

## 2019-12-16 PROCEDURE — 85610 PROTHROMBIN TIME: CPT | Performed by: PHYSICIAN ASSISTANT

## 2019-12-16 PROCEDURE — 93793 ANTICOAG MGMT PT WARFARIN: CPT | Performed by: PHYSICIAN ASSISTANT

## 2020-01-09 ENCOUNTER — ANTICOAG VISIT (OUTPATIENT)
Dept: CARDIOLOGY CLINIC | Facility: CLINIC | Age: 60
End: 2020-01-09
Payer: MEDICARE

## 2020-01-09 ENCOUNTER — HOSPITAL ENCOUNTER (OUTPATIENT)
Dept: NON INVASIVE DIAGNOSTICS | Facility: CLINIC | Age: 60
Discharge: HOME | End: 2020-01-09
Payer: MEDICARE

## 2020-01-09 DIAGNOSIS — Z79.01 LONG TERM (CURRENT) USE OF ANTICOAGULANTS: ICD-10-CM

## 2020-01-09 DIAGNOSIS — I50.32 CHRONIC DIASTOLIC CONGESTIVE HEART FAILURE (HCC): ICD-10-CM

## 2020-01-09 DIAGNOSIS — I48.21 PERMANENT ATRIAL FIBRILLATION (HCC): ICD-10-CM

## 2020-01-09 DIAGNOSIS — I89.0 LYMPHEDEMA: ICD-10-CM

## 2020-01-09 DIAGNOSIS — I10 ESSENTIAL HYPERTENSION: ICD-10-CM

## 2020-01-09 LAB — SL AMB POCT INR: 2.6

## 2020-01-09 PROCEDURE — 93306 TTE W/DOPPLER COMPLETE: CPT | Performed by: INTERNAL MEDICINE

## 2020-01-09 PROCEDURE — 93793 ANTICOAG MGMT PT WARFARIN: CPT | Performed by: PHYSICIAN ASSISTANT

## 2020-01-09 PROCEDURE — 85610 PROTHROMBIN TIME: CPT | Performed by: PHYSICIAN ASSISTANT

## 2020-01-09 PROCEDURE — 93306 TTE W/DOPPLER COMPLETE: CPT

## 2020-01-22 ENCOUNTER — TELEPHONE (OUTPATIENT)
Dept: VASCULAR SURGERY | Facility: CLINIC | Age: 60
End: 2020-01-22

## 2020-01-22 DIAGNOSIS — I10 ESSENTIAL HYPERTENSION: ICD-10-CM

## 2020-01-22 DIAGNOSIS — I50.32 CHRONIC DIASTOLIC CONGESTIVE HEART FAILURE (HCC): ICD-10-CM

## 2020-01-22 RX ORDER — LISINOPRIL 10 MG/1
TABLET ORAL
Qty: 90 TABLET | Refills: 0 | Status: SHIPPED | OUTPATIENT
Start: 2020-01-22 | End: 2020-08-27 | Stop reason: HOSPADM

## 2020-01-22 RX ORDER — METOLAZONE 5 MG/1
TABLET ORAL
Qty: 13 TABLET | Refills: 0 | Status: SHIPPED | OUTPATIENT
Start: 2020-01-22 | End: 2020-09-04

## 2020-01-22 NOTE — TELEPHONE ENCOUNTER
Left message to schedule follow up ov     Scheduling Instructions   3 month follow up lymphedema pump usage

## 2020-02-05 ENCOUNTER — OFFICE VISIT (OUTPATIENT)
Dept: VASCULAR SURGERY | Facility: HOSPITAL | Age: 60
End: 2020-02-05
Payer: MEDICARE

## 2020-02-05 VITALS
SYSTOLIC BLOOD PRESSURE: 113 MMHG | HEART RATE: 77 BPM | HEIGHT: 67 IN | WEIGHT: 315 LBS | TEMPERATURE: 97.7 F | BODY MASS INDEX: 49.44 KG/M2 | DIASTOLIC BLOOD PRESSURE: 68 MMHG

## 2020-02-05 DIAGNOSIS — I87.2 CHRONIC VENOUS INSUFFICIENCY: ICD-10-CM

## 2020-02-05 DIAGNOSIS — E11.69 TYPE 2 DIABETES MELLITUS WITH OTHER SPECIFIED COMPLICATION, WITHOUT LONG-TERM CURRENT USE OF INSULIN (HCC): ICD-10-CM

## 2020-02-05 DIAGNOSIS — I89.0 LYMPHEDEMA: ICD-10-CM

## 2020-02-05 DIAGNOSIS — I10 ESSENTIAL HYPERTENSION: ICD-10-CM

## 2020-02-05 DIAGNOSIS — E66.01 CLASS 3 SEVERE OBESITY WITH SERIOUS COMORBIDITY AND BODY MASS INDEX (BMI) OF 40.0 TO 44.9 IN ADULT, UNSPECIFIED OBESITY TYPE (HCC): Primary | ICD-10-CM

## 2020-02-05 PROCEDURE — 99214 OFFICE O/P EST MOD 30 MIN: CPT | Performed by: NURSE PRACTITIONER

## 2020-02-05 NOTE — ASSESSMENT & PLAN NOTE
58y male with HTN, DM, Afib, CHF, CM, Morbid obesity with BMI 51 52kg/m2,  CKD, and varicose veins with chronic venous insufficiency with secondary Lymphedema, returns for reassessment of chronic lower extremity swelling and lymphedema with pain      Patient has past history of vein stripping on the right leg      -patient has been using compression stockings periodically  Uses lymphedema pumps 1-2 times per day  Feels as though his abdomen is getting larger after use of lymphedema pumps  He has gained 20+ lb since our last office visit  Recommendations:  -lymphedema pump for daily use b i d  X1 hour each session for venous insufficiency with secondary lymphedema  -referral for lymphedema clinic for further evaluation and treatment  -advised to use compression stockings 20-30 mmHg on a regular basis daily during waking hours and remove at bedtime  -advised patient to moisturize the legs and feet on a regular basis  -continue to elevate the legs throughout the day  -continue with walking/exercise for 20-30 minutes 3 times a week or as tolerated  -continue with healthy lifestyle including low-fat, low-cholesterol, low-sodium diet and weight loss; patient is skin is significant amount of weight since our last office visit  Referral to nutrition services was placed  -continue with good blood pressure control per PCP and Cardiology  -discussed signs and symptoms of cellulitis or venous wounds with patient and when to call his primary care provider or seek additional medical assistance, patient expressed understanding  -continue Coumadin for atrial fibrillation  -continue with good blood sugar control per primary care provider for diabetes management  -advised patient to return on annual basis for reassessment unless there is progression of disease or wounds appear it which point in time he should call the office for re-evaluation

## 2020-02-05 NOTE — PROGRESS NOTES
Assessment/Plan:    Lymphedema  59y male with HTN, DM, Afib, CHF, CM, Morbid obesity with BMI 51 52kg/m2,  CKD, and varicose veins with chronic venous insufficiency with secondary Lymphedema, returns for reassessment of chronic lower extremity swelling and lymphedema with pain      Patient has past history of vein stripping on the right leg      -patient has been using compression stockings periodically  Uses lymphedema pumps 1-2 times per day  Feels as though his abdomen is getting larger after use of lymphedema pumps  He has gained 20+ lb since our last office visit  Recommendations:  -lymphedema pump for daily use b i d  X1 hour each session for venous insufficiency with secondary lymphedema  -referral for lymphedema clinic for further evaluation and treatment  -advised to use compression stockings 20-30 mmHg on a regular basis daily during waking hours and remove at bedtime  -advised patient to moisturize the legs and feet on a regular basis  -continue to elevate the legs throughout the day  -continue with walking/exercise for 20-30 minutes 3 times a week or as tolerated  -continue with healthy lifestyle including low-fat, low-cholesterol, low-sodium diet and weight loss; patient is skin is significant amount of weight since our last office visit  Referral to nutrition services was placed  -continue with good blood pressure control per PCP and Cardiology  -discussed signs and symptoms of cellulitis or venous wounds with patient and when to call his primary care provider or seek additional medical assistance, patient expressed understanding  -continue Coumadin for atrial fibrillation  -continue with good blood sugar control per primary care provider for diabetes management  -advised patient to return on annual basis for reassessment unless there is progression of disease or wounds appear it which point in time he should call the office for re-evaluation         Diagnoses and all orders for this visit:    Class 3 severe obesity with serious comorbidity and body mass index (BMI) of 40 0 to 44 9 in adult, unspecified obesity type ([facility] [address] )  -     Ambulatory referral to Nutrition Services; Future    Chronic venous insufficiency  -     Ambulatory referral to PT/OT lymphedema therapy; Future    Lymphedema  -     Ambulatory referral to PT/OT lymphedema therapy; Future    Type 2 diabetes mellitus with other specified complication, without long-term current use of insulin (HCC)    Essential hypertension          Subjective:      Patient ID: Jorge A Villavicencio is a 60 y o  male  Pt is here today for f/u exam for lymphedema  He has been using his lymphedema pumps 1-2 times daily  He c/o feeling like the fluid is moving up toward his groin and abdomen  He says that he feels like this after using the pumps  Pt hs a hx of right leg vein stripping  He is taking Coumadin  He is a former smoker  Odalis Meredith is a 58y male with HTN, DM II, Afib (on Coumadin), CHF, CM, Morbid obesity with BMI 51 52kg/m2,  CKD, and varicose veins with chronic venous insufficiency with secondary Lymphedema who returns for reassessment of chronic lower extremity swelling and lymphedema with pain      Patient has past history of vein stripping on the right leg  Patient has been using compression stockings periodically, he states he was unsure if he should use them with the lymphedema pumps  He reports use of lymphedema pumps 1-2 times per day  Feels as though his abdomen is getting larger after use of lymphedema pumps  He has gained 20+ lb since our last office visit, likely abodminal discomfort related to weight gain rather than mobilized fluid  Denies any wounds/ulceration to the legs/feet            The following portions of the patient's history were reviewed and updated as appropriate: allergies, current medications, past family history, past medical history, past social history, past surgical history and problem list     Review of Systems Constitutional: Positive for fatigue  HENT: Positive for rhinorrhea and sneezing  Eyes: Negative  Respiratory: Positive for shortness of breath (SOB with activity)  Cardiovascular: Positive for leg swelling  Gastrointestinal: Negative  Endocrine: Negative  Genitourinary: Negative  Musculoskeletal: Positive for arthralgias and gait problem  Skin: Positive for color change  Allergic/Immunologic: Positive for food allergies (eggs, fish)  Neurological: Negative for dizziness, tremors, seizures, syncope, facial asymmetry, speech difficulty, weakness, light-headedness, numbness and headaches  Hematological: Bruises/bleeds easily  Psychiatric/Behavioral: Negative  Objective:      /68 (BP Location: Left arm, Patient Position: Sitting, Cuff Size: Large)   Pulse 77   Temp 97 7 °F (36 5 °C) (Tympanic)   Ht 5' 7" (1 702 m)   Wt (!) 149 kg (328 lb 14 8 oz)   BMI 51 52 kg/m²          Physical Exam   Constitutional: He is oriented to person, place, and time  He appears well-developed and well-nourished  HENT:   Head: Normocephalic and atraumatic  Eyes: Pupils are equal, round, and reactive to light  EOM are normal  No scleral icterus  Neck: Normal range of motion  Cardiovascular: Normal rate, regular rhythm and normal heart sounds  +4 edema to B/L legs   Pulmonary/Chest: Effort normal and breath sounds normal    Abdominal: Soft  Bowel sounds are normal    Large obese abdomen   Musculoskeletal: Normal range of motion  Neurological: He is alert and oriented to person, place, and time  He has normal strength  Skin: Skin is warm, dry and intact  Capillary refill takes less than 2 seconds  Lipodermatosclerosis to bilateral lower extremities, hyperpigmentation  Papilledema  Dry skin  No ulcerations noted  Psychiatric: He has a normal mood and affect   His speech is normal and behavior is normal  Judgment and thought content normal  Cognition and memory are normal  Nursing note and vitals reviewed  I have reviewed and made appropriate changes to the review of systems input by the medical assistant  Vitals:    02/05/20 1313   BP: 113/68   BP Location: Left arm   Patient Position: Sitting   Cuff Size: Large   Pulse: 77   Temp: 97 7 °F (36 5 °C)   TempSrc: Tympanic   Weight: (!) 149 kg (328 lb 14 8 oz)   Height: 5' 7" (1 702 m)       Patient Active Problem List   Diagnosis    Atrial fibrillation (HCC)    Congestive heart failure (CHF) (HCC)    Diabetes mellitus ([facility] [address] )    Obesity    Lymphedema    Long term (current) use of anticoagulants    Chronic venous insufficiency    Varicose veins of both lower extremities with inflammation    Hypertension       Past Surgical History:   Procedure Laterality Date    HERNIA REPAIR      SPLENECTOMY, TOTAL      VASCULAR SURGERY Right     leg       History reviewed  No pertinent family history      Social History     Socioeconomic History    Marital status: /Civil Union     Spouse name: Not on file    Number of children: Not on file    Years of education: Not on file    Highest education level: Not on file   Occupational History    Not on file   Social Needs    Financial resource strain: Not on file    Food insecurity:     Worry: Not on file     Inability: Not on file    Transportation needs:     Medical: Not on file     Non-medical: Not on file   Tobacco Use    Smoking status: Former Smoker    Smokeless tobacco: Never Used    Tobacco comment: smokes only when he drinks   Substance and Sexual Activity    Alcohol use: Yes     Comment: occasionally    Drug use: No    Sexual activity: Not on file   Lifestyle    Physical activity:     Days per week: Not on file     Minutes per session: Not on file    Stress: Not on file   Relationships    Social connections:     Talks on phone: Not on file     Gets together: Not on file     Attends Congregation service: Not on file     Active member of club or organization: Not on file     Attends meetings of clubs or organizations: Not on file     Relationship status: Not on file    Intimate partner violence:     Fear of current or ex partner: Not on file     Emotionally abused: Not on file     Physically abused: Not on file     Forced sexual activity: Not on file   Other Topics Concern    Not on file   Social History Narrative    Not on file       Allergies   Allergen Reactions    Eggs Or Egg-Derived Products GI Intolerance         Current Outpatient Medications:     carvedilol (COREG) 6 25 mg tablet, Take 1 tablet (6 25 mg total) by mouth 2 (two) times a day with meals, Disp: 180 tablet, Rfl: 3    digoxin (LANOXIN) 0 25 mg tablet, Take 1 tablet (250 mcg total) by mouth daily, Disp: 30 tablet, Rfl: 5    furosemide (LASIX) 40 mg tablet, Take 1 tablet (40 mg total) by mouth daily, Disp: 90 tablet, Rfl: 3    lisinopril (ZESTRIL) 10 mg tablet, TAKE 1 TABLET DAILY, Disp: 90 tablet, Rfl: 0    metFORMIN (GLUCOPHAGE) 1000 MG tablet, Take 1,000 mg by mouth 2 (two) times a day with meals  , Disp: , Rfl:     metolazone (ZAROXOLYN) 5 mg tablet, TAKE 1 TABLET ONE DAY A    WEEK 30 MINUTES BEFORE     LASIX DOSE THAT DAY, Disp: 13 tablet, Rfl: 0    warfarin (COUMADIN) 10 mg tablet, Take 10 mg by mouth every other day Monday wednesday, Disp: , Rfl:     warfarin (COUMADIN) 5 mg tablet, Take 1 tablet by mouth daily or as directed , Disp: 90 tablet, Rfl: 3

## 2020-02-05 NOTE — PATIENT INSTRUCTIONS
Please look for the juxta Lite compression sleeve or CIRCAID compression sleeves at local medical providers  You may also look online at [employer] - you need to measure your leg if you order online and then wear them on a daily basis from the time you wake up in the morning until bedtime  You could also try to order TUBIGRIP G - online  You will have to order a box  This compression is a disposable compression sleeve  You can wear them for about 2-3 weeks  You should wear these during the day from the time you wake up in the morning in till bedtime  You can can wash them and hang them to dry overnight if they it swelled  Two layers of Tubigrip is better than 1 layer if you can tolerated to help with edema and swelling  Lymphedema Clinic Referral ordered  Continue to elevate your legs on a regular basis  Continue to walk and ambulate as much as possible  Continue with weight loss  Nutrition referral ordered  Apply moisturizer to your legs and feet on a daily basis  If you notice any redness, open wounds or swelling to a specific site, please see your primary care provider for evaluation of cellulitis/possible skin infection  Please continue to use lymphedema pumps twice a day for an hour each session  If you have any questions, please call our office  I will see you back in the office annually unless there is a change in your symptoms for re-evaluation of compression and lymphedema pumps  Lymphedema  AMBULATORY CARE:  The lymphatic system contains fluid, vessels, tissue, and organs  This system removes and carries fluid throughout the  body  It also helps the body fight infection  Lymphedema is the buildup of lymph fluid in fat tissue under your skin  The  buildup causes the area to swell  Lymphedema can happen any time that lymphatic vessels are blocked or damaged    Damage to lymphatic vessels may be caused by surgery, infection, injury, cancer, radiation, or scar tissue     Common signs and symptoms include the following: Signs and symptoms may happen where lymph nodes were  removed, or in the arm, leg, chest, or underarm   Swelling or itching   Pain, burning, or aching   Tight, hard, or red skin   Hair loss   Heaviness or fullness   Numbness or tingling   Stiffness  Contact your healthcare provider or lymphedema specialist if:   You have a fever or chills   You have an open area of skin that looks red or swollen, or drains pus   Your symptoms, such as swelling or pain, get worse   Your arms or legs feel heavy, or you cannot move them   Your skin becomes hard, thick, or rough   You have a skin wound that will not heal    Your shoes, clothes, or jewelry feel tighter   You have questions or concerns about your condition or care  Treatment for lymphedema can relieve symptoms and prevent lymphedema from getting worse  You may need  therapeutic massage, physical therapy, or compression devices to help decrease swelling and pain  Surgery may be  needed if other treatments do not work  Instructions  Astrid Brtiton (MRN: [id number])  Printed at 5/1/19 3:59 PM Page 5 of 5  Self-care:   Elevate your arm or leg above the level of your heart as often as you can  This will help decrease swelling and  pain  Prop your arm or leg on pillows or blankets to keep it elevated comfortably   Wear compression socks, sleeves, or bandages as directed  Compression devices must be fitted by a healthcare  provider  Compression devices may need to be replaced every 3 to 6 months   Exercise can help you maintain or regain function of your arm or leg  Ask your healthcare provider what type of  exercise to do and how often to do it  Start slow, take breaks, and gradually do more each day  Do not do  vigorous, repeated exercises  Watch for changes in your arm or leg during exercise  Stop and rest if you have  swelling, increased pain, or heaviness   Elevate your arm or leg above the level of your heart   Change your position often to help move lymphatic fluid through your body  Do not sit or  one position  for more than 30 minutes  Do not cross your legs when you sit  These actions can cause lymphatic fluid to buildup   Maintain a healthy weight  Ask your healthcare provider what you should weigh  Weight loss may improve your  symptoms  If you need to lose weight, your healthcare provider can help you create a weight loss program   Prevent infection with proper skin care: A skin infection can make lymphedema worse  Do the following to decrease  your risk for a skin infection in your arm or leg with lymphedema:  [facility] your skin gently and dry it well  Use a mild soap to wash your skin  Gently pat your skin dry after you  bathe  Apply a mild cream or lotion to moisturize your skin and prevent dryness or cracking  Keep your feet clean  and dry   Protect your skin from injury  Wear gloves when you garden and wash dishes  Cut your nails straight across to  prevent injury to your fingers and toes  Use sunscreen and insect repellant to avoid burns and punctures  Wear  slippers in the house  Wear shoes when you go outside   Check your skin every day for signs of infection  Signs of infection include redness, swelling, increased heat, or  pus  You may also have a fever or chills   Care for cuts, scratches or burns  Apply antibiotic ointment to cuts and other small breaks in the skin  Apply a  cold pack or cold water to a burn for 15 minutes  Then wash it with soap and water  Cover scratches, cuts, or burns  with a clean, dry gauze or bandage as directed  Keep the area clean and dry   Tell healthcare providers that you have lymphedema  Tell them not to do, IVs, blood draws, and blood  pressure readings in the arm or leg with lymphedema  If there is lymphedema in both arms, ask them to take your  blood pressure on your leg   Do not allow flu shots or vaccinations in your arm with lymphedema  Follow up with your healthcare provider or lymphedema specialist as directed: You will need regular visits so  healthcare providers can examine the affected areas  You may also be referred to a clinic that specializes in lymphedema  treatment  Write down your questions so you remember to ask them during your visits  © 2017 [address] Information is for End User's use only and may not be sold, redistributed or  otherwise used for commercial purposes  All illustrations and images included in CareNotes® are the copyrighted  property of [employer] , [employer]  or [employer]  The above information is an  only  It is not intended as medical advice for individual conditions or  treatments  Talk to your doctor, nurse or pharmacist before following any medical regimen to see if it is safe and  effective for you

## 2020-02-06 ENCOUNTER — ANTICOAG VISIT (OUTPATIENT)
Dept: CARDIOLOGY CLINIC | Facility: CLINIC | Age: 60
End: 2020-02-06
Payer: MEDICARE

## 2020-02-06 ENCOUNTER — OFFICE VISIT (OUTPATIENT)
Dept: CARDIOLOGY CLINIC | Facility: CLINIC | Age: 60
End: 2020-02-06
Payer: MEDICARE

## 2020-02-06 VITALS
DIASTOLIC BLOOD PRESSURE: 80 MMHG | WEIGHT: 315 LBS | SYSTOLIC BLOOD PRESSURE: 124 MMHG | HEART RATE: 74 BPM | HEIGHT: 67 IN | BODY MASS INDEX: 49.44 KG/M2

## 2020-02-06 DIAGNOSIS — I50.32 CHRONIC DIASTOLIC CONGESTIVE HEART FAILURE (HCC): Primary | ICD-10-CM

## 2020-02-06 DIAGNOSIS — Z79.01 LONG TERM (CURRENT) USE OF ANTICOAGULANTS: ICD-10-CM

## 2020-02-06 DIAGNOSIS — I48.21 PERMANENT ATRIAL FIBRILLATION (HCC): ICD-10-CM

## 2020-02-06 DIAGNOSIS — E66.01 CLASS 3 SEVERE OBESITY WITH SERIOUS COMORBIDITY AND BODY MASS INDEX (BMI) OF 40.0 TO 44.9 IN ADULT, UNSPECIFIED OBESITY TYPE (HCC): ICD-10-CM

## 2020-02-06 DIAGNOSIS — I48.21 PERMANENT ATRIAL FIBRILLATION (HCC): Primary | ICD-10-CM

## 2020-02-06 DIAGNOSIS — I10 ESSENTIAL HYPERTENSION: ICD-10-CM

## 2020-02-06 DIAGNOSIS — I27.20 PULMONARY HYPERTENSION (HCC): ICD-10-CM

## 2020-02-06 LAB — SL AMB POCT INR: 1.5

## 2020-02-06 PROCEDURE — 85610 PROTHROMBIN TIME: CPT | Performed by: PHYSICIAN ASSISTANT

## 2020-02-06 PROCEDURE — 99214 OFFICE O/P EST MOD 30 MIN: CPT | Performed by: INTERNAL MEDICINE

## 2020-02-06 PROCEDURE — 93793 ANTICOAG MGMT PT WARFARIN: CPT | Performed by: PHYSICIAN ASSISTANT

## 2020-02-06 NOTE — PATIENT INSTRUCTIONS

## 2020-02-06 NOTE — PROGRESS NOTES
Subjective:        Patient ID: Lili Tavera is a 60 y o  male  Chief Complaint:  Francisca Brown saw vascular, note reviewed  He has progressive lower extremity edema and weight gain  He is not watching his diet like he should  He has his usual dyspnea exertion  No palpitations  No chest pain  No presyncope or syncope  The following portions of the patient's history were reviewed and updated as appropriate: allergies, current medications, past family history, past medical history, past social history, past surgical history and problem list   Review of Systems   Constitution: Positive for malaise/fatigue and weight gain  Negative for chills and diaphoresis  HENT: Negative for nosebleeds and stridor  Eyes: Negative for double vision, vision loss in left eye, vision loss in right eye and visual disturbance  Cardiovascular: Positive for dyspnea on exertion and leg swelling  Negative for chest pain, claudication, cyanosis, irregular heartbeat, near-syncope, orthopnea, palpitations, paroxysmal nocturnal dyspnea and syncope  Respiratory: Negative for cough, shortness of breath, snoring and wheezing  Endocrine: Negative for polydipsia, polyphagia and polyuria  Hematologic/Lymphatic: Negative for bleeding problem  Does not bruise/bleed easily  Skin: Negative for flushing and rash  Musculoskeletal: Negative for falls and myalgias  Gastrointestinal: Negative for abdominal pain, heartburn, hematemesis, hematochezia, melena and nausea  Genitourinary: Negative for hematuria  Neurological: Negative for brief paralysis, dizziness, focal weakness, headaches, light-headedness, loss of balance and vertigo  Psychiatric/Behavioral: Negative for altered mental status and substance abuse  Allergic/Immunologic: Negative for hives            Objective:      /80   Pulse 74   Ht 5' 7" (1 702 m)   Wt (!) 148 kg (327 lb)   BMI 51 22 kg/m²   Physical Exam   Constitutional: He is oriented to person, place, and time  He appears well-developed and well-nourished  No distress  HENT:   Head: Normocephalic and atraumatic  Eyes: Pupils are equal, round, and reactive to light  EOM are normal  No scleral icterus  Neck: Normal range of motion  Neck supple  No JVD present  No thyromegaly present  Cardiovascular: Normal rate and normal heart sounds  Exam reveals no gallop and no friction rub  No murmur heard  Pulmonary/Chest: Effort normal and breath sounds normal  No stridor  No respiratory distress  He has no wheezes  He has no rales  Abdominal: Soft  Bowel sounds are normal  He exhibits no distension and no mass  There is no tenderness  Musculoskeletal: Normal range of motion  He exhibits edema (Plus four bilateral lower extremities below knees)  He exhibits no deformity  Neurological: He is alert and oriented to person, place, and time  Coordination normal    Skin: Skin is warm and dry  No erythema  No pallor  Psychiatric: He has a normal mood and affect  His behavior is normal        Lab Review:   Anticoag visit on 01/09/2020   Component Date Value    POCT INR 01/09/2020 2 6    Anticoag visit on 12/16/2019   Component Date Value    POCT INR 12/16/2019 1 9      No results found  Assessment:       1  Chronic diastolic congestive heart failure (HCC)  Comprehensive metabolic panel    NT-BNP PRO    CBC and differential    Digoxin level    Lipid panel    Diagnostic Sleep Study    CANCELED: Diagnostic Sleep Study   2  Essential hypertension  Comprehensive metabolic panel    NT-BNP PRO    CBC and differential    Digoxin level    Lipid panel    CANCELED: Diagnostic Sleep Study   3  Permanent atrial fibrillation  Comprehensive metabolic panel    NT-BNP PRO    CBC and differential    Digoxin level    Lipid panel    apixaban (ELIQUIS) 5 mg    CANCELED: Diagnostic Sleep Study   4   Pulmonary hypertension (HCC)  Comprehensive metabolic panel    NT-BNP PRO    CBC and differential    Digoxin level    Lipid panel Diagnostic Sleep Study    CANCELED: Diagnostic Sleep Study   5  Class 3 severe obesity with serious comorbidity and body mass index (BMI) of 40 0 to 44 9 in adult, unspecified obesity type ([facility] [address] )     6  Long term (current) use of anticoagulants          Plan:       Odalis Meredith has no signs or symptoms reminiscent of angina pectoris nor uncontrolled atrial fibrillation  Odalis Meredith is not well compliant with his Coumadin, his INR has been very difficult to manage  We are going to see if he can afford Eliquis, prescription put in for 5 mg b i d   If not he was told to call for Coumadin instructions  He examines rather volume overloaded, I appreciate vascular's plans to try to better his lymphedema, I ordered full blood work, nothing done recently, I told him I cannot escalate diuretics I told him until I know where his renal function stands  In light of mild-to-moderate pulmonary hypertension via echocardiography and progressive lower extremity edema seen I ordered a sleep study, he has multiple clinical signs of sleep apnea as well including daytime hypersomnolence  He says he would use CPAP if recommended  Apparently there are no sleep studies available till the end of April so I will see him back shortly after this is completed to review

## 2020-02-20 ENCOUNTER — ANTICOAG VISIT (OUTPATIENT)
Dept: CARDIOLOGY CLINIC | Facility: CLINIC | Age: 60
End: 2020-02-20
Payer: MEDICARE

## 2020-02-20 DIAGNOSIS — I48.21 PERMANENT ATRIAL FIBRILLATION (HCC): ICD-10-CM

## 2020-02-20 DIAGNOSIS — Z79.01 LONG TERM (CURRENT) USE OF ANTICOAGULANTS: ICD-10-CM

## 2020-02-20 LAB — SL AMB POCT INR: 2.2

## 2020-02-20 PROCEDURE — 93793 ANTICOAG MGMT PT WARFARIN: CPT | Performed by: PHYSICIAN ASSISTANT

## 2020-02-20 PROCEDURE — 85610 PROTHROMBIN TIME: CPT | Performed by: PHYSICIAN ASSISTANT

## 2020-02-20 NOTE — PATIENT INSTRUCTIONS
If no changes in medication health or diet  No missed or extra doses  No s/s of bleeding TIA or CVA  No new ABX, NSAIDs OCT meds, or supplements  Dose as instructed and recheck in three weeks  appt made      Anisha Isaacs PA-C

## 2020-02-25 ENCOUNTER — TELEPHONE (OUTPATIENT)
Dept: SLEEP CENTER | Facility: CLINIC | Age: 60
End: 2020-02-25

## 2020-02-25 NOTE — TELEPHONE ENCOUNTER
----- Message from Brittany Wallace MD sent at 2/20/2020  9:35 AM EST -----  Approved  ----- Message -----  From: Billy Severino MA  Sent: 2/13/2020   8:16 AM EST  To: Sleep Medicine [place] Provider    This sleep study needs approval      If approved please sign and return to clerical pool  If denied please include reasons why  Also provide alternative testing if warranted  Please sign and return to clerical pool

## 2020-03-03 DIAGNOSIS — I25.10 CORONARY ARTERY DISEASE INVOLVING NATIVE CORONARY ARTERY OF NATIVE HEART WITHOUT ANGINA PECTORIS: ICD-10-CM

## 2020-03-04 RX ORDER — CARVEDILOL 6.25 MG/1
6.25 TABLET ORAL 2 TIMES DAILY WITH MEALS
Qty: 180 TABLET | Refills: 3 | Status: SHIPPED | OUTPATIENT
Start: 2020-03-04 | End: 2020-11-11 | Stop reason: SDUPTHER

## 2020-03-04 RX ORDER — FUROSEMIDE 40 MG/1
40 TABLET ORAL DAILY
Qty: 90 TABLET | Refills: 3 | Status: SHIPPED | OUTPATIENT
Start: 2020-03-04 | End: 2020-11-11 | Stop reason: SDUPTHER

## 2020-03-05 ENCOUNTER — APPOINTMENT (OUTPATIENT)
Dept: LAB | Facility: HOSPITAL | Age: 60
End: 2020-03-05
Attending: INTERNAL MEDICINE
Payer: MEDICARE

## 2020-03-05 DIAGNOSIS — I27.20 PULMONARY HYPERTENSION (HCC): ICD-10-CM

## 2020-03-05 DIAGNOSIS — I10 ESSENTIAL HYPERTENSION: ICD-10-CM

## 2020-03-05 DIAGNOSIS — I48.21 PERMANENT ATRIAL FIBRILLATION (HCC): ICD-10-CM

## 2020-03-05 DIAGNOSIS — I50.32 CHRONIC DIASTOLIC CONGESTIVE HEART FAILURE (HCC): ICD-10-CM

## 2020-03-05 LAB
ALBUMIN SERPL BCP-MCNC: 3.3 G/DL (ref 3.5–5)
ALP SERPL-CCNC: 133 U/L (ref 46–116)
ALT SERPL W P-5'-P-CCNC: 24 U/L (ref 12–78)
ANION GAP SERPL CALCULATED.3IONS-SCNC: 9 MMOL/L (ref 4–13)
AST SERPL W P-5'-P-CCNC: 26 U/L (ref 5–45)
BASOPHILS # BLD AUTO: 0.05 THOUSANDS/ΜL (ref 0–0.1)
BASOPHILS NFR BLD AUTO: 1 % (ref 0–1)
BILIRUB SERPL-MCNC: 1.3 MG/DL (ref 0.2–1)
BUN SERPL-MCNC: 17 MG/DL (ref 5–25)
CALCIUM SERPL-MCNC: 8.9 MG/DL (ref 8.3–10.1)
CHLORIDE SERPL-SCNC: 102 MMOL/L (ref 100–108)
CHOLEST SERPL-MCNC: 143 MG/DL (ref 50–200)
CO2 SERPL-SCNC: 27 MMOL/L (ref 21–32)
CREAT SERPL-MCNC: 1.4 MG/DL (ref 0.6–1.3)
DIGOXIN SERPL-MCNC: 1 NG/ML (ref 0.8–2)
EOSINOPHIL # BLD AUTO: 0.24 THOUSAND/ΜL (ref 0–0.61)
EOSINOPHIL NFR BLD AUTO: 5 % (ref 0–6)
ERYTHROCYTE [DISTWIDTH] IN BLOOD BY AUTOMATED COUNT: 14.7 % (ref 11.6–15.1)
GFR SERPL CREATININE-BSD FRML MDRD: 54 ML/MIN/1.73SQ M
GLUCOSE P FAST SERPL-MCNC: 127 MG/DL (ref 65–99)
HCT VFR BLD AUTO: 30.9 % (ref 36.5–49.3)
HDLC SERPL-MCNC: 41 MG/DL
HGB BLD-MCNC: 9.6 G/DL (ref 12–17)
IMM GRANULOCYTES # BLD AUTO: 0.02 THOUSAND/UL (ref 0–0.2)
IMM GRANULOCYTES NFR BLD AUTO: 0 % (ref 0–2)
LDLC SERPL CALC-MCNC: 73 MG/DL (ref 0–100)
LYMPHOCYTES # BLD AUTO: 0.88 THOUSANDS/ΜL (ref 0.6–4.47)
LYMPHOCYTES NFR BLD AUTO: 17 % (ref 14–44)
MCH RBC QN AUTO: 32.5 PG (ref 26.8–34.3)
MCHC RBC AUTO-ENTMCNC: 31.1 G/DL (ref 31.4–37.4)
MCV RBC AUTO: 105 FL (ref 82–98)
MONOCYTES # BLD AUTO: 0.46 THOUSAND/ΜL (ref 0.17–1.22)
MONOCYTES NFR BLD AUTO: 9 % (ref 4–12)
NEUTROPHILS # BLD AUTO: 3.57 THOUSANDS/ΜL (ref 1.85–7.62)
NEUTS SEG NFR BLD AUTO: 68 % (ref 43–75)
NONHDLC SERPL-MCNC: 102 MG/DL
NRBC BLD AUTO-RTO: 0 /100 WBCS
NT-PROBNP SERPL-MCNC: 1030 PG/ML
PLATELET # BLD AUTO: 99 THOUSANDS/UL (ref 149–390)
PMV BLD AUTO: 10.1 FL (ref 8.9–12.7)
POTASSIUM SERPL-SCNC: 4.4 MMOL/L (ref 3.5–5.3)
PROT SERPL-MCNC: 8.1 G/DL (ref 6.4–8.2)
RBC # BLD AUTO: 2.95 MILLION/UL (ref 3.88–5.62)
SODIUM SERPL-SCNC: 138 MMOL/L (ref 136–145)
TRIGL SERPL-MCNC: 144 MG/DL
WBC # BLD AUTO: 5.22 THOUSAND/UL (ref 4.31–10.16)

## 2020-03-05 PROCEDURE — 36415 COLL VENOUS BLD VENIPUNCTURE: CPT | Performed by: INTERNAL MEDICINE

## 2020-03-05 PROCEDURE — 80053 COMPREHEN METABOLIC PANEL: CPT | Performed by: INTERNAL MEDICINE

## 2020-03-05 PROCEDURE — 85025 COMPLETE CBC W/AUTO DIFF WBC: CPT | Performed by: INTERNAL MEDICINE

## 2020-03-05 PROCEDURE — 80162 ASSAY OF DIGOXIN TOTAL: CPT | Performed by: INTERNAL MEDICINE

## 2020-03-05 PROCEDURE — 83880 ASSAY OF NATRIURETIC PEPTIDE: CPT

## 2020-03-05 PROCEDURE — 80061 LIPID PANEL: CPT | Performed by: INTERNAL MEDICINE

## 2020-03-13 ENCOUNTER — APPOINTMENT (OUTPATIENT)
Dept: LAB | Facility: HOSPITAL | Age: 60
End: 2020-03-13
Payer: MEDICARE

## 2020-03-13 ENCOUNTER — TRANSCRIBE ORDERS (OUTPATIENT)
Dept: ADMINISTRATIVE | Facility: HOSPITAL | Age: 60
End: 2020-03-13

## 2020-03-13 DIAGNOSIS — E11.9 TYPE 2 DIABETES MELLITUS WITHOUT COMPLICATION, UNSPECIFIED WHETHER LONG TERM INSULIN USE (HCC): ICD-10-CM

## 2020-03-13 DIAGNOSIS — D64.9 ANEMIA, UNSPECIFIED TYPE: ICD-10-CM

## 2020-03-13 DIAGNOSIS — E55.9 VITAMIN D DEFICIENCY: Primary | ICD-10-CM

## 2020-03-13 DIAGNOSIS — E55.9 VITAMIN D DEFICIENCY: ICD-10-CM

## 2020-03-13 LAB
25(OH)D3 SERPL-MCNC: 14.4 NG/ML (ref 30–100)
ALBUMIN SERPL BCP-MCNC: 3.2 G/DL (ref 3.5–5)
ALP SERPL-CCNC: 139 U/L (ref 46–116)
ALT SERPL W P-5'-P-CCNC: 22 U/L (ref 12–78)
ANION GAP SERPL CALCULATED.3IONS-SCNC: 6 MMOL/L (ref 4–13)
AST SERPL W P-5'-P-CCNC: 25 U/L (ref 5–45)
BASOPHILS # BLD AUTO: 0.05 THOUSANDS/ΜL (ref 0–0.1)
BASOPHILS NFR BLD AUTO: 1 % (ref 0–1)
BILIRUB SERPL-MCNC: 1 MG/DL (ref 0.2–1)
BUN SERPL-MCNC: 26 MG/DL (ref 5–25)
CALCIUM SERPL-MCNC: 8.4 MG/DL (ref 8.3–10.1)
CHLORIDE SERPL-SCNC: 102 MMOL/L (ref 100–108)
CO2 SERPL-SCNC: 27 MMOL/L (ref 21–32)
CREAT SERPL-MCNC: 1.91 MG/DL (ref 0.6–1.3)
EOSINOPHIL # BLD AUTO: 0.28 THOUSAND/ΜL (ref 0–0.61)
EOSINOPHIL NFR BLD AUTO: 5 % (ref 0–6)
ERYTHROCYTE [DISTWIDTH] IN BLOOD BY AUTOMATED COUNT: 15.7 % (ref 11.6–15.1)
EST. AVERAGE GLUCOSE BLD GHB EST-MCNC: 111 MG/DL
FOLATE SERPL-MCNC: 13.1 NG/ML (ref 3.1–17.5)
GFR SERPL CREATININE-BSD FRML MDRD: 37 ML/MIN/1.73SQ M
GLUCOSE P FAST SERPL-MCNC: 128 MG/DL (ref 65–99)
HBA1C MFR BLD: 5.5 %
HCT VFR BLD AUTO: 29.8 % (ref 36.5–49.3)
HGB BLD-MCNC: 9.2 G/DL (ref 12–17)
IMM GRANULOCYTES # BLD AUTO: 0.03 THOUSAND/UL (ref 0–0.2)
IMM GRANULOCYTES NFR BLD AUTO: 1 % (ref 0–2)
IRON SERPL-MCNC: 74 UG/DL (ref 65–175)
LYMPHOCYTES # BLD AUTO: 0.88 THOUSANDS/ΜL (ref 0.6–4.47)
LYMPHOCYTES NFR BLD AUTO: 15 % (ref 14–44)
MCH RBC QN AUTO: 32.7 PG (ref 26.8–34.3)
MCHC RBC AUTO-ENTMCNC: 30.9 G/DL (ref 31.4–37.4)
MCV RBC AUTO: 106 FL (ref 82–98)
MONOCYTES # BLD AUTO: 0.45 THOUSAND/ΜL (ref 0.17–1.22)
MONOCYTES NFR BLD AUTO: 8 % (ref 4–12)
NEUTROPHILS # BLD AUTO: 4.05 THOUSANDS/ΜL (ref 1.85–7.62)
NEUTS SEG NFR BLD AUTO: 70 % (ref 43–75)
NRBC BLD AUTO-RTO: 0 /100 WBCS
PLATELET # BLD AUTO: 128 THOUSANDS/UL (ref 149–390)
PMV BLD AUTO: 11.2 FL (ref 8.9–12.7)
POTASSIUM SERPL-SCNC: 4.3 MMOL/L (ref 3.5–5.3)
PROT SERPL-MCNC: 7.7 G/DL (ref 6.4–8.2)
RBC # BLD AUTO: 2.81 MILLION/UL (ref 3.88–5.62)
RETICS # AUTO: ABNORMAL 10*3/UL (ref 14356–105094)
RETICS # CALC: 3.33 % (ref 0.37–1.87)
SODIUM SERPL-SCNC: 135 MMOL/L (ref 136–145)
TIBC SERPL-MCNC: 423 UG/DL (ref 250–450)
VIT B12 SERPL-MCNC: 379 PG/ML (ref 100–900)
WBC # BLD AUTO: 5.74 THOUSAND/UL (ref 4.31–10.16)

## 2020-03-13 PROCEDURE — 83036 HEMOGLOBIN GLYCOSYLATED A1C: CPT

## 2020-03-13 PROCEDURE — 85045 AUTOMATED RETICULOCYTE COUNT: CPT

## 2020-03-13 PROCEDURE — 83540 ASSAY OF IRON: CPT

## 2020-03-13 PROCEDURE — 36415 COLL VENOUS BLD VENIPUNCTURE: CPT

## 2020-03-13 PROCEDURE — 82306 VITAMIN D 25 HYDROXY: CPT

## 2020-03-13 PROCEDURE — 83550 IRON BINDING TEST: CPT

## 2020-03-13 PROCEDURE — 85025 COMPLETE CBC W/AUTO DIFF WBC: CPT

## 2020-03-13 PROCEDURE — 82746 ASSAY OF FOLIC ACID SERUM: CPT

## 2020-03-13 PROCEDURE — 82607 VITAMIN B-12: CPT

## 2020-03-13 PROCEDURE — 80053 COMPREHEN METABOLIC PANEL: CPT

## 2020-03-19 ENCOUNTER — ANTICOAG VISIT (OUTPATIENT)
Dept: CARDIOLOGY CLINIC | Facility: CLINIC | Age: 60
End: 2020-03-19
Payer: MEDICARE

## 2020-03-19 DIAGNOSIS — Z79.01 LONG TERM (CURRENT) USE OF ANTICOAGULANTS: ICD-10-CM

## 2020-03-19 DIAGNOSIS — I48.21 PERMANENT ATRIAL FIBRILLATION (HCC): ICD-10-CM

## 2020-03-19 LAB — SL AMB POCT INR: 2

## 2020-03-19 PROCEDURE — 93793 ANTICOAG MGMT PT WARFARIN: CPT | Performed by: PHYSICIAN ASSISTANT

## 2020-03-19 PROCEDURE — 85610 PROTHROMBIN TIME: CPT | Performed by: PHYSICIAN ASSISTANT

## 2020-03-19 NOTE — PATIENT INSTRUCTIONS
If no changes in medication health or diet  No missed or extra doses  No s/s of bleeding TIA or CVA  No new ABX, NSAIDs OCT meds, or supplements  Dose as instructed and recheck in one month     Eugene Freire PA-C

## 2020-03-24 ENCOUNTER — TELEMEDICINE (OUTPATIENT)
Dept: GASTROENTEROLOGY | Facility: CLINIC | Age: 60
End: 2020-03-24
Payer: MEDICARE

## 2020-03-24 ENCOUNTER — TELEPHONE (OUTPATIENT)
Dept: GASTROENTEROLOGY | Facility: MEDICAL CENTER | Age: 60
End: 2020-03-24

## 2020-03-24 DIAGNOSIS — D64.9 ANEMIA, UNSPECIFIED TYPE: Primary | ICD-10-CM

## 2020-03-24 PROCEDURE — 99442 PR PHYS/QHP TELEPHONE EVALUATION 11-20 MIN: CPT | Performed by: INTERNAL MEDICINE

## 2020-03-24 NOTE — TELEPHONE ENCOUNTER
----- Message from Rosa Elena Gordon MD sent at 3/24/2020  8:54 AM EDT -----  Patient seen today in office    Needs egd/colon for anemia    Level 3 so please schedule within 4 weeks

## 2020-03-24 NOTE — PATIENT INSTRUCTIONS
Schedule for a Colon/EGD @ [facility]  Level 3 4-8 weeks  With Dr Bebe Trujillo on 5/29/2020  Miralax/Dulcoalx preparation  Recall in for a follow up after procedure for 6/2020 with PA

## 2020-03-24 NOTE — PROGRESS NOTES
Virtual Brief Visit    Problem List Items Addressed This Visit     None      Visit Diagnoses     Anemia, unspecified type    -  Primary        Assessment and Plan:         1  Anemia, unspecified type  Caroline Fritz is a 63 y o  male with history of atrial fibrillation on coumadin, CHF, hypertension, diabetes who presents for evaluation of anemia  His Hgb has had progressive downtrend with normal values last in 2015, and Hgb 10 in spring 2019, recent 3/2020 labs show macrocytic anemia with Hgb 9s, and normal iron studies  Differential diagnosis includes anemia related to chronic blood loss, PUD given history of anticoagulant use, colon polyp, malignancy  I recommend EGD and colonoscopy for evaluation  I discussed the risk and benefits of these procedures with the patient today including risks of infection, perforation and hemorrhage were discussed  The patient was agreeable to proceed with the procedure  Due to current restrictions in place on non-emergent endoscopic procedures during pandemic conditions of COVID19, patient will be deferred for outpatient endoscopy as soon as possible  This is in accordance with ASGE guidelines for endoscopy during [address] pandemic (https://els-jbs-prod-cdn literatumonline  com/pb/assets/raw/Health%20[employer]/journals/ymge/UHK-[email or web address]%20_Roy-3353874262379  pdf)  Video visit can be arranged with me for close follow up and monitoring  Please ensure patient has Mychart access prior to office checkout  Please do not hesitate to contact me if the situation changes  Reason for visit is anemia    Encounter provider Ashkan Mukherjee MD    Provider located at [address] [place] [id number] [phone]      Recent Visits  No visits were found meeting these conditions     Showing recent visits within past 7 days and meeting all other requirements     Future Appointments  No visits were found meeting these conditions  Showing future appointments within next 150 days and meeting all other requirements        After connecting through [employer], the patient was identified by name and date of birth  Jewels Quevedoludmila was informed that this is a telemedicine visit and that the visit is being conducted through telephone which may not be secure and therefore, might not be HIPAA-compliant  My office door was closed  No one else was in the room  He acknowledged consent and understanding of privacy and security of the video platform  The patient has agreed to participate and understands they can discontinue the visit at any time  Simeon Brown is a 63 y o  male with a history of atrial fibrillation on coumadin pulmonary hypertension, diabetes, hypertension who presents for evaluation of anemia  He reports that routine blood work in March of this year and was found to have anemia  Prior laboratory values in 3/2020 showed Hgb 9 2-9 6 mcv 105-106, Hgb 10 in april 2019, and 13 5 in 2015  Recent iron, tibc, b12 and folate normal    In regards to GI symptoms, he reports daily, formed bowel movements and denies melena or hematochezia  He denies abdominal pain, nausea or vomiting  His appetite is good and his weight is stable  He notes chronic shortness of breath related to his CHF and leg swelling  He does report mildly increased abdominal swelling and takes his diuretics as prescribed  He currently takes coumadin for atrial fibrillation  He denies history of reflux  He reports a family history of father with liver and colon cancer  He is unsure age of diagnosis or if the cancer was a colon primary  He personally has no prior egd or colonoscopy   He has no method of colon cancer screening in the past          Past Medical History:   Diagnosis Date    A-fib [facility])     Cardiac disease     Chronic combined systolic (congestive) and diastolic (congestive) heart failure ([facility] [address] )     Diabetes mellitus ([facility] )     Dilated cardiomyopathy ([facility] )     History of echocardiogram 11/24/2014    EF 0 30 (30%), Likely mod LV systolic dysfunction  Likely RV dysfunction as well   History of Lexiscan MPI 02/19/2016    EF 0 43 (43%), no prior MI or ischemia   Hx of echocardiogram 04/28/2017    Normal EF, Normal LV systolic function  Mild concentric LV hypertrophy  Mild mitral and tricuspid regurg   Hx: recurrent pneumonia     Hypertension     Long term (current) use of anticoagulants     Morbid (severe) obesity due to excess calories (HCC)     Neuropathy        Past Surgical History:   Procedure Laterality Date    HERNIA REPAIR      SPLENECTOMY, TOTAL      VASCULAR SURGERY Right     leg       Current Outpatient Medications   Medication Sig Dispense Refill    apixaban (ELIQUIS) 5 mg Take 1 tablet (5 mg total) by mouth 2 (two) times a day 60 tablet 5    carvedilol (COREG) 6 25 mg tablet Take 1 tablet (6 25 mg total) by mouth 2 (two) times a day with meals 180 tablet 3    digoxin (LANOXIN) 0 25 mg tablet Take 1 tablet (250 mcg total) by mouth daily 30 tablet 5    furosemide (LASIX) 40 mg tablet Take 1 tablet (40 mg total) by mouth daily 90 tablet 3    lisinopril (ZESTRIL) 10 mg tablet TAKE 1 TABLET DAILY 90 tablet 0    metFORMIN (GLUCOPHAGE) 1000 MG tablet Take 1,000 mg by mouth 2 (two) times a day with meals   metolazone (ZAROXOLYN) 5 mg tablet TAKE 1 TABLET ONE DAY A    WEEK 30 MINUTES BEFORE     LASIX DOSE THAT DAY 13 tablet 0     No current facility-administered medications for this visit  Allergies   Allergen Reactions    Eggs Or Egg-Derived Products GI Intolerance       Review of Systems      I spent 15 minutes with the patient during this visit

## 2020-04-01 ENCOUNTER — TELEPHONE (OUTPATIENT)
Dept: GASTROENTEROLOGY | Facility: CLINIC | Age: 60
End: 2020-04-01

## 2020-04-06 ENCOUNTER — TELEPHONE (OUTPATIENT)
Dept: GASTROENTEROLOGY | Facility: CLINIC | Age: 60
End: 2020-04-06

## 2020-04-07 ENCOUNTER — HOSPITAL ENCOUNTER (EMERGENCY)
Facility: HOSPITAL | Age: 60
Discharge: HOME | End: 2020-04-07
Attending: EMERGENCY MEDICINE | Admitting: EMERGENCY MEDICINE
Payer: MEDICARE

## 2020-04-07 ENCOUNTER — APPOINTMENT (EMERGENCY)
Dept: CT IMAGING | Facility: HOSPITAL | Age: 60
End: 2020-04-07
Payer: MEDICARE

## 2020-04-07 VITALS
DIASTOLIC BLOOD PRESSURE: 70 MMHG | WEIGHT: 315 LBS | TEMPERATURE: 97.3 F | HEIGHT: 71 IN | HEART RATE: 85 BPM | OXYGEN SATURATION: 96 % | BODY MASS INDEX: 44.1 KG/M2 | RESPIRATION RATE: 18 BRPM | SYSTOLIC BLOOD PRESSURE: 132 MMHG

## 2020-04-07 DIAGNOSIS — N18.30 CKD (CHRONIC KIDNEY DISEASE) STAGE 3, GFR 30-59 ML/MIN (HCC): ICD-10-CM

## 2020-04-07 DIAGNOSIS — D69.6 THROMBOCYTOPENIA (HCC): ICD-10-CM

## 2020-04-07 DIAGNOSIS — M54.50 ACUTE RIGHT-SIDED LOW BACK PAIN WITHOUT SCIATICA: Primary | ICD-10-CM

## 2020-04-07 DIAGNOSIS — D64.9 CHRONIC ANEMIA: ICD-10-CM

## 2020-04-07 DIAGNOSIS — K80.20 GALLSTONES: ICD-10-CM

## 2020-04-07 DIAGNOSIS — K74.60 CIRRHOSIS (HCC): ICD-10-CM

## 2020-04-07 LAB
ALBUMIN SERPL BCP-MCNC: 3.4 G/DL (ref 3.5–5)
ALP SERPL-CCNC: 127 U/L (ref 46–116)
ALT SERPL W P-5'-P-CCNC: 23 U/L (ref 12–78)
ANION GAP SERPL CALCULATED.3IONS-SCNC: 8 MMOL/L (ref 4–13)
AST SERPL W P-5'-P-CCNC: 23 U/L (ref 5–45)
BASOPHILS # BLD AUTO: 0.05 THOUSANDS/ΜL (ref 0–0.1)
BASOPHILS NFR BLD AUTO: 1 % (ref 0–1)
BILIRUB SERPL-MCNC: 0.5 MG/DL (ref 0.2–1)
BILIRUB UR QL STRIP: NEGATIVE
BUN SERPL-MCNC: 25 MG/DL (ref 5–25)
CALCIUM SERPL-MCNC: 9 MG/DL (ref 8.3–10.1)
CHLORIDE SERPL-SCNC: 100 MMOL/L (ref 100–108)
CLARITY UR: CLEAR
CO2 SERPL-SCNC: 27 MMOL/L (ref 21–32)
COLOR UR: YELLOW
CREAT SERPL-MCNC: 1.38 MG/DL (ref 0.6–1.3)
EOSINOPHIL # BLD AUTO: 0.2 THOUSAND/ΜL (ref 0–0.61)
EOSINOPHIL NFR BLD AUTO: 4 % (ref 0–6)
ERYTHROCYTE [DISTWIDTH] IN BLOOD BY AUTOMATED COUNT: 14.7 % (ref 11.6–15.1)
GFR SERPL CREATININE-BSD FRML MDRD: 55 ML/MIN/1.73SQ M
GLUCOSE SERPL-MCNC: 146 MG/DL (ref 65–140)
GLUCOSE UR STRIP-MCNC: NEGATIVE MG/DL
HCT VFR BLD AUTO: 32.3 % (ref 36.5–49.3)
HGB BLD-MCNC: 10.1 G/DL (ref 12–17)
HGB UR QL STRIP.AUTO: NEGATIVE
IMM GRANULOCYTES # BLD AUTO: 0.03 THOUSAND/UL (ref 0–0.2)
IMM GRANULOCYTES NFR BLD AUTO: 1 % (ref 0–2)
KETONES UR STRIP-MCNC: NEGATIVE MG/DL
LEUKOCYTE ESTERASE UR QL STRIP: NEGATIVE
LYMPHOCYTES # BLD AUTO: 1.07 THOUSANDS/ΜL (ref 0.6–4.47)
LYMPHOCYTES NFR BLD AUTO: 21 % (ref 14–44)
MCH RBC QN AUTO: 32.3 PG (ref 26.8–34.3)
MCHC RBC AUTO-ENTMCNC: 31.3 G/DL (ref 31.4–37.4)
MCV RBC AUTO: 103 FL (ref 82–98)
MONOCYTES # BLD AUTO: 0.36 THOUSAND/ΜL (ref 0.17–1.22)
MONOCYTES NFR BLD AUTO: 7 % (ref 4–12)
NEUTROPHILS # BLD AUTO: 3.42 THOUSANDS/ΜL (ref 1.85–7.62)
NEUTS SEG NFR BLD AUTO: 66 % (ref 43–75)
NITRITE UR QL STRIP: NEGATIVE
NRBC BLD AUTO-RTO: 0 /100 WBCS
PH UR STRIP.AUTO: 5.5 [PH]
PLATELET # BLD AUTO: 146 THOUSANDS/UL (ref 149–390)
PMV BLD AUTO: 10.6 FL (ref 8.9–12.7)
POTASSIUM SERPL-SCNC: 5 MMOL/L (ref 3.5–5.3)
PROT SERPL-MCNC: 8.3 G/DL (ref 6.4–8.2)
PROT UR STRIP-MCNC: NEGATIVE MG/DL
RBC # BLD AUTO: 3.13 MILLION/UL (ref 3.88–5.62)
SODIUM SERPL-SCNC: 135 MMOL/L (ref 136–145)
SP GR UR STRIP.AUTO: 1.01 (ref 1–1.03)
UROBILINOGEN UR QL STRIP.AUTO: 0.2 E.U./DL
WBC # BLD AUTO: 5.13 THOUSAND/UL (ref 4.31–10.16)

## 2020-04-07 PROCEDURE — 99284 EMERGENCY DEPT VISIT MOD MDM: CPT

## 2020-04-07 PROCEDURE — 81003 URINALYSIS AUTO W/O SCOPE: CPT | Performed by: PHYSICIAN ASSISTANT

## 2020-04-07 PROCEDURE — 36415 COLL VENOUS BLD VENIPUNCTURE: CPT | Performed by: PHYSICIAN ASSISTANT

## 2020-04-07 PROCEDURE — 74176 CT ABD & PELVIS W/O CONTRAST: CPT

## 2020-04-07 PROCEDURE — 80053 COMPREHEN METABOLIC PANEL: CPT | Performed by: PHYSICIAN ASSISTANT

## 2020-04-07 PROCEDURE — 85025 COMPLETE CBC W/AUTO DIFF WBC: CPT | Performed by: PHYSICIAN ASSISTANT

## 2020-04-07 PROCEDURE — 99284 EMERGENCY DEPT VISIT MOD MDM: CPT | Performed by: PHYSICIAN ASSISTANT

## 2020-04-07 PROCEDURE — 51798 US URINE CAPACITY MEASURE: CPT

## 2020-04-07 RX ORDER — TRAMADOL HYDROCHLORIDE 50 MG/1
50 TABLET ORAL 2 TIMES DAILY PRN
Qty: 10 TABLET | Refills: 0 | Status: SHIPPED | OUTPATIENT
Start: 2020-04-07 | End: 2020-07-13

## 2020-04-07 RX ORDER — METHOCARBAMOL 500 MG/1
500 TABLET, FILM COATED ORAL 2 TIMES DAILY PRN
Qty: 14 TABLET | Refills: 0 | Status: SHIPPED | OUTPATIENT
Start: 2020-04-07 | End: 2020-08-27 | Stop reason: HOSPADM

## 2020-04-07 RX ORDER — ACETAMINOPHEN 325 MG/1
650 TABLET ORAL ONCE
Status: COMPLETED | OUTPATIENT
Start: 2020-04-07 | End: 2020-04-07

## 2020-04-07 RX ORDER — LIDOCAINE 50 MG/G
1 PATCH TOPICAL ONCE
Status: DISCONTINUED | OUTPATIENT
Start: 2020-04-07 | End: 2020-04-07 | Stop reason: HOSPADM

## 2020-04-07 RX ADMIN — ACETAMINOPHEN 650 MG: 325 TABLET ORAL at 11:17

## 2020-04-07 RX ADMIN — LIDOCAINE 1 PATCH: 50 PATCH TOPICAL at 11:17

## 2020-04-08 ENCOUNTER — PATIENT OUTREACH (OUTPATIENT)
Dept: CASE MANAGEMENT | Facility: OTHER | Age: 60
End: 2020-04-08

## 2020-04-23 ENCOUNTER — ANTICOAG VISIT (OUTPATIENT)
Dept: CARDIOLOGY CLINIC | Facility: CLINIC | Age: 60
End: 2020-04-23
Payer: MEDICARE

## 2020-04-23 DIAGNOSIS — I48.21 PERMANENT ATRIAL FIBRILLATION (HCC): ICD-10-CM

## 2020-04-23 DIAGNOSIS — Z79.01 LONG TERM (CURRENT) USE OF ANTICOAGULANTS: ICD-10-CM

## 2020-04-23 LAB — SL AMB POCT INR: 1.6

## 2020-04-23 PROCEDURE — 93793 ANTICOAG MGMT PT WARFARIN: CPT | Performed by: PHYSICIAN ASSISTANT

## 2020-04-23 PROCEDURE — 85610 PROTHROMBIN TIME: CPT | Performed by: PHYSICIAN ASSISTANT

## 2020-04-28 DIAGNOSIS — I48.21 PERMANENT ATRIAL FIBRILLATION (HCC): ICD-10-CM

## 2020-04-28 RX ORDER — DIGOXIN 250 MCG
TABLET ORAL
Qty: 30 TABLET | Refills: 0 | Status: SHIPPED | OUTPATIENT
Start: 2020-04-28 | End: 2020-11-11 | Stop reason: SDUPTHER

## 2020-04-30 ENCOUNTER — HOSPITAL ENCOUNTER (OUTPATIENT)
Dept: SLEEP CENTER | Facility: HOSPITAL | Age: 60
Discharge: HOME | End: 2020-04-30
Attending: INTERNAL MEDICINE
Payer: MEDICARE

## 2020-04-30 ENCOUNTER — ANTICOAG VISIT (OUTPATIENT)
Dept: CARDIOLOGY CLINIC | Facility: CLINIC | Age: 60
End: 2020-04-30
Payer: MEDICARE

## 2020-04-30 DIAGNOSIS — I48.21 PERMANENT ATRIAL FIBRILLATION (HCC): ICD-10-CM

## 2020-04-30 DIAGNOSIS — I50.32 CHRONIC DIASTOLIC CONGESTIVE HEART FAILURE (HCC): ICD-10-CM

## 2020-04-30 DIAGNOSIS — Z79.01 LONG TERM (CURRENT) USE OF ANTICOAGULANTS: ICD-10-CM

## 2020-04-30 DIAGNOSIS — I27.20 PULMONARY HYPERTENSION (HCC): ICD-10-CM

## 2020-04-30 LAB — SL AMB POCT INR: 2.1

## 2020-04-30 PROCEDURE — 95810 POLYSOM 6/> YRS 4/> PARAM: CPT

## 2020-04-30 PROCEDURE — 93793 ANTICOAG MGMT PT WARFARIN: CPT | Performed by: PHYSICIAN ASSISTANT

## 2020-04-30 PROCEDURE — 85610 PROTHROMBIN TIME: CPT | Performed by: PHYSICIAN ASSISTANT

## 2020-05-01 ENCOUNTER — TRANSCRIBE ORDERS (OUTPATIENT)
Dept: ADMINISTRATIVE | Facility: HOSPITAL | Age: 60
End: 2020-05-01

## 2020-05-01 DIAGNOSIS — G47.33 OBSTRUCTIVE SLEEP APNEA: Primary | ICD-10-CM

## 2020-05-05 ENCOUNTER — TELEPHONE (OUTPATIENT)
Dept: SLEEP CENTER | Facility: CLINIC | Age: 60
End: 2020-05-05

## 2020-05-11 ENCOUNTER — TELEPHONE (OUTPATIENT)
Dept: SLEEP CENTER | Facility: CLINIC | Age: 60
End: 2020-05-11

## 2020-05-19 DIAGNOSIS — Z11.59 SCREENING FOR VIRAL DISEASE: Primary | ICD-10-CM

## 2020-05-21 ENCOUNTER — TELEPHONE (OUTPATIENT)
Dept: GASTROENTEROLOGY | Facility: CLINIC | Age: 60
End: 2020-05-21

## 2020-05-22 ENCOUNTER — ANTICOAG VISIT (OUTPATIENT)
Dept: CARDIOLOGY CLINIC | Facility: CLINIC | Age: 60
End: 2020-05-22
Payer: MEDICARE

## 2020-05-22 ENCOUNTER — OFFICE VISIT (OUTPATIENT)
Dept: CARDIOLOGY CLINIC | Facility: CLINIC | Age: 60
End: 2020-05-22
Payer: MEDICARE

## 2020-05-22 VITALS
DIASTOLIC BLOOD PRESSURE: 60 MMHG | BODY MASS INDEX: 45.33 KG/M2 | HEART RATE: 80 BPM | SYSTOLIC BLOOD PRESSURE: 104 MMHG | WEIGHT: 315 LBS

## 2020-05-22 DIAGNOSIS — I48.21 PERMANENT ATRIAL FIBRILLATION (HCC): Primary | ICD-10-CM

## 2020-05-22 DIAGNOSIS — E66.01 CLASS 3 SEVERE OBESITY WITH SERIOUS COMORBIDITY IN ADULT, UNSPECIFIED BMI, UNSPECIFIED OBESITY TYPE (HCC): ICD-10-CM

## 2020-05-22 DIAGNOSIS — Z79.01 LONG TERM CURRENT USE OF ANTICOAGULANT: ICD-10-CM

## 2020-05-22 DIAGNOSIS — Z79.01 LONG TERM (CURRENT) USE OF ANTICOAGULANTS: ICD-10-CM

## 2020-05-22 DIAGNOSIS — I50.32 CHRONIC DIASTOLIC CONGESTIVE HEART FAILURE (HCC): ICD-10-CM

## 2020-05-22 DIAGNOSIS — I48.21 PERMANENT ATRIAL FIBRILLATION (HCC): ICD-10-CM

## 2020-05-22 DIAGNOSIS — I10 ESSENTIAL HYPERTENSION: ICD-10-CM

## 2020-05-22 DIAGNOSIS — G47.33 OSA (OBSTRUCTIVE SLEEP APNEA): ICD-10-CM

## 2020-05-22 LAB — SL AMB POCT INR: 1.9

## 2020-05-22 PROCEDURE — 99214 OFFICE O/P EST MOD 30 MIN: CPT | Performed by: INTERNAL MEDICINE

## 2020-05-22 PROCEDURE — 3066F NEPHROPATHY DOC TX: CPT | Performed by: INTERNAL MEDICINE

## 2020-05-22 PROCEDURE — 1036F TOBACCO NON-USER: CPT | Performed by: INTERNAL MEDICINE

## 2020-05-22 PROCEDURE — 93793 ANTICOAG MGMT PT WARFARIN: CPT | Performed by: PHYSICIAN ASSISTANT

## 2020-05-22 PROCEDURE — 3078F DIAST BP <80 MM HG: CPT | Performed by: INTERNAL MEDICINE

## 2020-05-22 PROCEDURE — 85610 PROTHROMBIN TIME: CPT | Performed by: PHYSICIAN ASSISTANT

## 2020-05-22 PROCEDURE — 3044F HG A1C LEVEL LT 7.0%: CPT | Performed by: INTERNAL MEDICINE

## 2020-05-22 PROCEDURE — 3074F SYST BP LT 130 MM HG: CPT | Performed by: INTERNAL MEDICINE

## 2020-05-22 RX ORDER — WARFARIN SODIUM 5 MG/1
TABLET ORAL
Qty: 45 TABLET | Refills: 5 | Status: SHIPPED | OUTPATIENT
Start: 2020-05-22 | End: 2020-11-11 | Stop reason: SDUPTHER

## 2020-05-22 RX ORDER — WARFARIN SODIUM 10 MG/1
TABLET ORAL
Qty: 90 TABLET | Refills: 3
Start: 2020-05-22 | End: 2020-07-13

## 2020-05-27 ENCOUNTER — TELEPHONE (OUTPATIENT)
Dept: GASTROENTEROLOGY | Facility: CLINIC | Age: 60
End: 2020-05-27

## 2020-05-27 DIAGNOSIS — Z11.59 SCREENING FOR VIRAL DISEASE: ICD-10-CM

## 2020-05-27 PROCEDURE — U0003 INFECTIOUS AGENT DETECTION BY NUCLEIC ACID (DNA OR RNA); SEVERE ACUTE RESPIRATORY SYNDROME CORONAVIRUS 2 (SARS-COV-2) (CORONAVIRUS DISEASE [COVID-19]), AMPLIFIED PROBE TECHNIQUE, MAKING USE OF HIGH THROUGHPUT TECHNOLOGIES AS DESCRIBED BY CMS-2020-01-R: HCPCS

## 2020-05-30 LAB — SARS-COV-2 RNA SPEC QL NAA+PROBE: NOT DETECTED

## 2020-06-01 ENCOUNTER — ANESTHESIA EVENT (OUTPATIENT)
Dept: PERIOP | Facility: HOSPITAL | Age: 60
End: 2020-06-01

## 2020-06-02 ENCOUNTER — HOSPITAL ENCOUNTER (OUTPATIENT)
Dept: PERIOP | Facility: HOSPITAL | Age: 60
Setting detail: OUTPATIENT SURGERY
Discharge: HOME | End: 2020-06-02
Attending: INTERNAL MEDICINE | Admitting: INTERNAL MEDICINE
Payer: MEDICARE

## 2020-06-02 ENCOUNTER — ANESTHESIA (OUTPATIENT)
Dept: PERIOP | Facility: HOSPITAL | Age: 60
End: 2020-06-02

## 2020-06-02 VITALS
HEART RATE: 80 BPM | OXYGEN SATURATION: 100 % | SYSTOLIC BLOOD PRESSURE: 135 MMHG | TEMPERATURE: 97.2 F | DIASTOLIC BLOOD PRESSURE: 71 MMHG | RESPIRATION RATE: 18 BRPM

## 2020-06-02 DIAGNOSIS — D64.9 ANEMIA, UNSPECIFIED TYPE: ICD-10-CM

## 2020-06-02 DIAGNOSIS — K63.5 POLYP OF COLON, UNSPECIFIED PART OF COLON, UNSPECIFIED TYPE: ICD-10-CM

## 2020-06-02 DIAGNOSIS — K29.70 GASTRITIS WITHOUT BLEEDING, UNSPECIFIED CHRONICITY, UNSPECIFIED GASTRITIS TYPE: Primary | ICD-10-CM

## 2020-06-02 DIAGNOSIS — K20.90 ESOPHAGITIS: Primary | ICD-10-CM

## 2020-06-02 LAB
GLUCOSE SERPL-MCNC: 108 MG/DL (ref 65–140)
GLUCOSE SERPL-MCNC: 112 MG/DL (ref 65–140)

## 2020-06-02 PROCEDURE — 88305 TISSUE EXAM BY PATHOLOGIST: CPT | Performed by: PATHOLOGY

## 2020-06-02 PROCEDURE — 45388 COLONOSCOPY W/ABLATION: CPT | Performed by: INTERNAL MEDICINE

## 2020-06-02 PROCEDURE — 43239 EGD BIOPSY SINGLE/MULTIPLE: CPT | Performed by: INTERNAL MEDICINE

## 2020-06-02 PROCEDURE — 82948 REAGENT STRIP/BLOOD GLUCOSE: CPT

## 2020-06-02 RX ORDER — PROPOFOL 10 MG/ML
INJECTION, EMULSION INTRAVENOUS AS NEEDED
Status: DISCONTINUED | OUTPATIENT
Start: 2020-06-02 | End: 2020-06-02 | Stop reason: SURG

## 2020-06-02 RX ORDER — LIDOCAINE HYDROCHLORIDE 10 MG/ML
INJECTION, SOLUTION EPIDURAL; INFILTRATION; INTRACAUDAL; PERINEURAL AS NEEDED
Status: DISCONTINUED | OUTPATIENT
Start: 2020-06-02 | End: 2020-06-02 | Stop reason: SURG

## 2020-06-02 RX ORDER — KETAMINE HCL IN NACL, ISO-OSM 100MG/10ML
SYRINGE (ML) INJECTION AS NEEDED
Status: DISCONTINUED | OUTPATIENT
Start: 2020-06-02 | End: 2020-06-02 | Stop reason: SURG

## 2020-06-02 RX ORDER — SODIUM CHLORIDE, SODIUM LACTATE, POTASSIUM CHLORIDE, CALCIUM CHLORIDE 600; 310; 30; 20 MG/100ML; MG/100ML; MG/100ML; MG/100ML
125 INJECTION, SOLUTION INTRAVENOUS CONTINUOUS
Status: DISCONTINUED | OUTPATIENT
Start: 2020-06-02 | End: 2020-06-06 | Stop reason: HOSPADM

## 2020-06-02 RX ORDER — PROPOFOL 10 MG/ML
INJECTION, EMULSION INTRAVENOUS CONTINUOUS PRN
Status: DISCONTINUED | OUTPATIENT
Start: 2020-06-02 | End: 2020-06-02 | Stop reason: SURG

## 2020-06-02 RX ORDER — OMEPRAZOLE 40 MG/1
40 CAPSULE, DELAYED RELEASE ORAL 2 TIMES DAILY
Qty: 60 CAPSULE | Refills: 2 | Status: SHIPPED | OUTPATIENT
Start: 2020-06-02 | End: 2020-09-14

## 2020-06-02 RX ORDER — LIDOCAINE HYDROCHLORIDE 10 MG/ML
0.5 INJECTION, SOLUTION EPIDURAL; INFILTRATION; INTRACAUDAL; PERINEURAL ONCE AS NEEDED
Status: DISCONTINUED | OUTPATIENT
Start: 2020-06-02 | End: 2020-06-06 | Stop reason: HOSPADM

## 2020-06-02 RX ADMIN — Medication 20 MG: at 10:56

## 2020-06-02 RX ADMIN — PROPOFOL 100 MG: 10 INJECTION, EMULSION INTRAVENOUS at 10:42

## 2020-06-02 RX ADMIN — PROPOFOL 50 MG: 10 INJECTION, EMULSION INTRAVENOUS at 10:49

## 2020-06-02 RX ADMIN — PROPOFOL 20 MG: 10 INJECTION, EMULSION INTRAVENOUS at 10:52

## 2020-06-02 RX ADMIN — PROPOFOL 100 MCG/KG/MIN: 10 INJECTION, EMULSION INTRAVENOUS at 10:58

## 2020-06-02 RX ADMIN — PROPOFOL 30 MG: 10 INJECTION, EMULSION INTRAVENOUS at 10:46

## 2020-06-02 RX ADMIN — PROPOFOL 40 MG: 10 INJECTION, EMULSION INTRAVENOUS at 10:54

## 2020-06-02 RX ADMIN — Medication 30 MG: at 10:41

## 2020-06-02 RX ADMIN — PROPOFOL 50 MG: 10 INJECTION, EMULSION INTRAVENOUS at 10:56

## 2020-06-02 RX ADMIN — LIDOCAINE HYDROCHLORIDE 50 MG: 10 INJECTION, SOLUTION EPIDURAL; INFILTRATION; INTRACAUDAL; PERINEURAL at 10:43

## 2020-06-02 RX ADMIN — PROPOFOL 10 MG: 10 INJECTION, EMULSION INTRAVENOUS at 11:21

## 2020-06-02 RX ADMIN — SODIUM CHLORIDE, SODIUM LACTATE, POTASSIUM CHLORIDE, AND CALCIUM CHLORIDE: .6; .31; .03; .02 INJECTION, SOLUTION INTRAVENOUS at 10:26

## 2020-06-04 ENCOUNTER — TELEPHONE (OUTPATIENT)
Dept: GASTROENTEROLOGY | Facility: CLINIC | Age: 60
End: 2020-06-04

## 2020-06-04 DIAGNOSIS — Z11.59 SCREENING FOR VIRAL DISEASE: Primary | ICD-10-CM

## 2020-06-05 ENCOUNTER — PREP FOR PROCEDURE (OUTPATIENT)
Dept: SURGERY | Facility: CLINIC | Age: 60
End: 2020-06-05

## 2020-06-05 DIAGNOSIS — Z11.59 SCREENING FOR VIRAL DISEASE: Primary | ICD-10-CM

## 2020-06-09 ENCOUNTER — TELEPHONE (OUTPATIENT)
Dept: GASTROENTEROLOGY | Facility: AMBULARY SURGERY CENTER | Age: 60
End: 2020-06-09

## 2020-06-09 NOTE — TELEPHONE ENCOUNTER
----- Message from Ben Aguiar MD sent at 6/3/2020 10:44 PM EDT -----  Please let pt know that stomach and small bowel biopsies show mild inflammation  Continue pantoprazole 40 mg twice a day for 1 month and then decrease to pantoprazole 40 mg daily for 2 months  EGD and colonoscopy to be repeated in 3 months       Thank you,  Hannah Ayala

## 2020-06-10 ENCOUNTER — TELEPHONE (OUTPATIENT)
Dept: GASTROENTEROLOGY | Facility: CLINIC | Age: 60
End: 2020-06-10

## 2020-06-11 ENCOUNTER — ANTICOAG VISIT (OUTPATIENT)
Dept: CARDIOLOGY CLINIC | Facility: CLINIC | Age: 60
End: 2020-06-11
Payer: MEDICARE

## 2020-06-11 DIAGNOSIS — I48.21 PERMANENT ATRIAL FIBRILLATION (HCC): ICD-10-CM

## 2020-06-11 DIAGNOSIS — Z79.01 LONG TERM (CURRENT) USE OF ANTICOAGULANTS: ICD-10-CM

## 2020-06-11 LAB — SL AMB POCT INR: 1.8

## 2020-06-11 PROCEDURE — 85610 PROTHROMBIN TIME: CPT | Performed by: PHYSICIAN ASSISTANT

## 2020-06-11 PROCEDURE — 93793 ANTICOAG MGMT PT WARFARIN: CPT | Performed by: PHYSICIAN ASSISTANT

## 2020-06-18 ENCOUNTER — ANTICOAG VISIT (OUTPATIENT)
Dept: CARDIOLOGY CLINIC | Facility: CLINIC | Age: 60
End: 2020-06-18
Payer: MEDICARE

## 2020-06-18 DIAGNOSIS — Z79.01 LONG TERM (CURRENT) USE OF ANTICOAGULANTS: ICD-10-CM

## 2020-06-18 DIAGNOSIS — I48.21 PERMANENT ATRIAL FIBRILLATION (HCC): ICD-10-CM

## 2020-06-18 LAB — SL AMB POCT INR: 3.8

## 2020-06-18 PROCEDURE — 85610 PROTHROMBIN TIME: CPT | Performed by: PHYSICIAN ASSISTANT

## 2020-06-18 PROCEDURE — 93793 ANTICOAG MGMT PT WARFARIN: CPT | Performed by: PHYSICIAN ASSISTANT

## 2020-06-22 ENCOUNTER — TELEPHONE (OUTPATIENT)
Dept: SLEEP CENTER | Facility: CLINIC | Age: 60
End: 2020-06-22

## 2020-06-22 DIAGNOSIS — Z20.822 ENCOUNTER FOR LABORATORY TESTING FOR COVID-19 VIRUS: Primary | ICD-10-CM

## 2020-06-23 ENCOUNTER — TELEPHONE (OUTPATIENT)
Dept: GASTROENTEROLOGY | Facility: CLINIC | Age: 60
End: 2020-06-23

## 2020-06-25 ENCOUNTER — ANTICOAG VISIT (OUTPATIENT)
Dept: CARDIOLOGY CLINIC | Facility: CLINIC | Age: 60
End: 2020-06-25
Payer: MEDICARE

## 2020-06-25 DIAGNOSIS — I48.21 PERMANENT ATRIAL FIBRILLATION (HCC): ICD-10-CM

## 2020-06-25 DIAGNOSIS — Z79.01 LONG TERM (CURRENT) USE OF ANTICOAGULANTS: ICD-10-CM

## 2020-06-25 LAB — SL AMB POCT INR: 2.4

## 2020-06-25 PROCEDURE — 93793 ANTICOAG MGMT PT WARFARIN: CPT | Performed by: PHYSICIAN ASSISTANT

## 2020-06-25 PROCEDURE — 85610 PROTHROMBIN TIME: CPT | Performed by: PHYSICIAN ASSISTANT

## 2020-07-06 DIAGNOSIS — Z20.822 ENCOUNTER FOR LABORATORY TESTING FOR COVID-19 VIRUS: ICD-10-CM

## 2020-07-06 PROCEDURE — U0003 INFECTIOUS AGENT DETECTION BY NUCLEIC ACID (DNA OR RNA); SEVERE ACUTE RESPIRATORY SYNDROME CORONAVIRUS 2 (SARS-COV-2) (CORONAVIRUS DISEASE [COVID-19]), AMPLIFIED PROBE TECHNIQUE, MAKING USE OF HIGH THROUGHPUT TECHNOLOGIES AS DESCRIBED BY CMS-2020-01-R: HCPCS

## 2020-07-08 LAB
INPATIENT: NORMAL
SARS-COV-2 RNA SPEC QL NAA+PROBE: NOT DETECTED

## 2020-07-13 ENCOUNTER — OFFICE VISIT (OUTPATIENT)
Dept: FAMILY MEDICINE CLINIC | Facility: CLINIC | Age: 60
End: 2020-07-13
Payer: MEDICARE

## 2020-07-13 VITALS
SYSTOLIC BLOOD PRESSURE: 132 MMHG | BODY MASS INDEX: 45.1 KG/M2 | HEIGHT: 70 IN | RESPIRATION RATE: 20 BRPM | WEIGHT: 315 LBS | HEART RATE: 84 BPM | DIASTOLIC BLOOD PRESSURE: 84 MMHG

## 2020-07-13 DIAGNOSIS — I83.11 VARICOSE VEINS OF BOTH LOWER EXTREMITIES WITH INFLAMMATION: ICD-10-CM

## 2020-07-13 DIAGNOSIS — I83.12 VARICOSE VEINS OF BOTH LOWER EXTREMITIES WITH INFLAMMATION: ICD-10-CM

## 2020-07-13 DIAGNOSIS — I10 ESSENTIAL HYPERTENSION: ICD-10-CM

## 2020-07-13 DIAGNOSIS — G47.33 OSA (OBSTRUCTIVE SLEEP APNEA): ICD-10-CM

## 2020-07-13 DIAGNOSIS — Z79.01 LONG TERM (CURRENT) USE OF ANTICOAGULANTS: ICD-10-CM

## 2020-07-13 DIAGNOSIS — I89.0 LYMPHEDEMA: ICD-10-CM

## 2020-07-13 DIAGNOSIS — E66.01 MORBID OBESITY WITH BMI OF 45.0-49.9, ADULT (HCC): ICD-10-CM

## 2020-07-13 DIAGNOSIS — I50.32 CHRONIC DIASTOLIC CONGESTIVE HEART FAILURE (HCC): ICD-10-CM

## 2020-07-13 DIAGNOSIS — I87.2 CHRONIC VENOUS INSUFFICIENCY: ICD-10-CM

## 2020-07-13 DIAGNOSIS — E11.69 TYPE 2 DIABETES MELLITUS WITH OTHER SPECIFIED COMPLICATION, WITHOUT LONG-TERM CURRENT USE OF INSULIN (HCC): Primary | ICD-10-CM

## 2020-07-13 DIAGNOSIS — I48.91 ATRIAL FIBRILLATION, UNSPECIFIED TYPE (HCC): ICD-10-CM

## 2020-07-13 DIAGNOSIS — I48.21 PERMANENT ATRIAL FIBRILLATION (HCC): ICD-10-CM

## 2020-07-13 PROCEDURE — 99214 OFFICE O/P EST MOD 30 MIN: CPT | Performed by: FAMILY MEDICINE

## 2020-07-13 PROCEDURE — 3066F NEPHROPATHY DOC TX: CPT | Performed by: FAMILY MEDICINE

## 2020-07-13 PROCEDURE — 3075F SYST BP GE 130 - 139MM HG: CPT | Performed by: FAMILY MEDICINE

## 2020-07-13 PROCEDURE — 3044F HG A1C LEVEL LT 7.0%: CPT | Performed by: FAMILY MEDICINE

## 2020-07-13 PROCEDURE — 3079F DIAST BP 80-89 MM HG: CPT | Performed by: FAMILY MEDICINE

## 2020-07-13 PROCEDURE — 3008F BODY MASS INDEX DOCD: CPT | Performed by: FAMILY MEDICINE

## 2020-07-13 NOTE — PROGRESS NOTES
Assessment/Plan: Morbid obesity BMI of 49 93 the plan is to refer the patient to bariatric  Diabetes mellitus type 2  Congestive heart failure  Chronic venous insufficiency  Varicose veins of lower extremities  Hypertension  Problem List Items Addressed This Visit     None           There are no diagnoses linked to this encounter  No problem-specific Assessment & Plan notes found for this encounter  PHQ-9 Depression Screening    PHQ-9:    Frequency of the following problems over the past two weeks:       Little interest or pleasure in doing things:  0 - not at all  Feeling down, depressed, or hopeless:  0 - not at all  PHQ-2 Score:  0          Body mass index is 49 93 kg/m²  BMI Counseling: Body mass index is 49 93 kg/m²  The BMI is above normal  Nutrition recommendations include reducing portion sizes, decreasing overall calorie intake, 3-5 servings of fruits/vegetables daily, reducing fast food intake, consuming healthier snacks, decreasing soda and/or juice intake, moderation in carbohydrate intake, increasing intake of lean protein, reducing intake of saturated fat and trans fat and reducing intake of cholesterol  Subjective:      Patient ID: Burak Mercado is a 63 y o  male  Presents with a chief complaint that he must lose weight he short of breath      The following portions of the patient's history were reviewed and updated as appropriate:   He has a past medical history of A-fib [facility]), Cardiac disease, Chronic combined systolic (congestive) and diastolic (congestive) heart failure ([address] ), Diabetes mellitus ([address] ), Dilated cardiomyopathy ([address] ), History of echocardiogram (11/24/2014), History of Lexiscan MPI (02/19/2016), echocardiogram (04/28/2017), recurrent pneumonia, Hypertension, Long term (current) use of anticoagulants, Morbid (severe) obesity due to excess calories ([address] ), and Neuropathy  ,  does not have any pertinent problems on file  ,   has a past surgical history that includes Hernia repair; Vascular surgery (Right); and Splenectomy, total ,  family history is not on file  ,   reports that he has been smoking  He has never used smokeless tobacco  He reports that he drinks alcohol  He reports that he does not use drugs  ,  is allergic to eggs or egg-derived products     Current Outpatient Medications   Medication Sig Dispense Refill    carvedilol (COREG) 6 25 mg tablet Take 1 tablet (6 25 mg total) by mouth 2 (two) times a day with meals 180 tablet 3    digoxin (LANOXIN) 0 25 mg tablet Take 1 tablet by mouth once daily 30 tablet 0    furosemide (LASIX) 40 mg tablet Take 1 tablet (40 mg total) by mouth daily 90 tablet 3    lisinopril (ZESTRIL) 10 mg tablet TAKE 1 TABLET DAILY 90 tablet 0    metFORMIN (GLUCOPHAGE) 1000 MG tablet Take 1,000 mg by mouth 2 (two) times a day with meals   methocarbamol (ROBAXIN) 500 mg tablet Take 1 tablet (500 mg total) by mouth 2 (two) times a day as needed for muscle spasms 14 tablet 0    metolazone (ZAROXOLYN) 5 mg tablet TAKE 1 TABLET ONE DAY A    WEEK 30 MINUTES BEFORE     LASIX DOSE THAT DAY 13 tablet 0    omeprazole (PriLOSEC) 40 MG capsule Take 1 capsule (40 mg total) by mouth 2 (two) times a day 60 capsule 2    warfarin (COUMADIN) 5 mg tablet Take 1/2 to 1 tablet by oral route daily  45 tablet 5    polyethylene glycol (GOLYTELY) 4000 mL solution Take 4,000 mL by mouth once for 1 dose 4000 mL 0     No current facility-administered medications for this visit  Review of Systems   Constitutional: Negative  HENT: Negative  Eyes: Negative  Respiratory: Positive for shortness of breath  Cardiovascular: Positive for leg swelling  Gastrointestinal: Negative  Endocrine: Negative  Genitourinary: Negative  Musculoskeletal: Positive for arthralgias, back pain and gait problem  Skin: Negative  Allergic/Immunologic: Negative  Hematological: Negative  Psychiatric/Behavioral: Negative            Objective:    /84 Pulse 84   Resp 20   Ht 5' 10" (1 778 m)   Wt (!) 158 kg (348 lb)   BMI 49 93 kg/m²   Body mass index is 49 93 kg/m²  Physical Exam   Constitutional: He is oriented to person, place, and time  He appears well-developed and well-nourished  Morbidly obese   HENT:   Head: Normocephalic  Eyes: Pupils are equal, round, and reactive to light  Neck: Normal range of motion  Cardiovascular: Normal rate, regular rhythm and normal heart sounds  There is +2 pitting edema of the lower extremities bilaterally with varicose veins    Pulmonary/Chest: Effort normal and breath sounds normal    Abdominal: Soft  Bowel sounds are normal  There is no tenderness  Abdomen is protuberant   Musculoskeletal:   Uses cane   Neurological: He is alert and oriented to person, place, and time  Skin: Skin is warm  Psychiatric: He has a normal mood and affect

## 2020-07-15 ENCOUNTER — ANTICOAG VISIT (OUTPATIENT)
Dept: CARDIOLOGY CLINIC | Facility: CLINIC | Age: 60
End: 2020-07-15
Payer: MEDICARE

## 2020-07-15 DIAGNOSIS — I48.21 PERMANENT ATRIAL FIBRILLATION (HCC): ICD-10-CM

## 2020-07-15 DIAGNOSIS — Z79.01 LONG TERM (CURRENT) USE OF ANTICOAGULANTS: ICD-10-CM

## 2020-07-15 LAB — SL AMB POCT INR: 2.6

## 2020-07-15 PROCEDURE — 85610 PROTHROMBIN TIME: CPT | Performed by: PHYSICIAN ASSISTANT

## 2020-07-15 PROCEDURE — 93793 ANTICOAG MGMT PT WARFARIN: CPT | Performed by: PHYSICIAN ASSISTANT

## 2020-07-15 NOTE — PATIENT INSTRUCTIONS
If no changes in medication health or diet  No missed or extra doses  No s/s of bleeding TIA or CVA  No new ABX, NSAIDs OCT meds, or supplements  Dose as instructed and recheck in three weeks     Zuhair Patel PA-C

## 2020-07-23 ENCOUNTER — OFFICE VISIT (OUTPATIENT)
Dept: SLEEP CENTER | Facility: CLINIC | Age: 60
End: 2020-07-23
Payer: MEDICARE

## 2020-07-23 VITALS
WEIGHT: 315 LBS | SYSTOLIC BLOOD PRESSURE: 104 MMHG | HEIGHT: 70 IN | BODY MASS INDEX: 45.1 KG/M2 | OXYGEN SATURATION: 96 % | HEART RATE: 70 BPM | TEMPERATURE: 99 F | DIASTOLIC BLOOD PRESSURE: 80 MMHG

## 2020-07-23 DIAGNOSIS — G47.33 OSA (OBSTRUCTIVE SLEEP APNEA): Primary | ICD-10-CM

## 2020-07-23 DIAGNOSIS — I27.20 PULMONARY HYPERTENSION (HCC): ICD-10-CM

## 2020-07-23 DIAGNOSIS — I48.21 PERMANENT ATRIAL FIBRILLATION (HCC): ICD-10-CM

## 2020-07-23 DIAGNOSIS — G47.09 OTHER INSOMNIA: ICD-10-CM

## 2020-07-23 DIAGNOSIS — I50.32 CHRONIC DIASTOLIC CONGESTIVE HEART FAILURE (HCC): ICD-10-CM

## 2020-07-23 DIAGNOSIS — E66.01 MORBID OBESITY WITH BMI OF 40.0-44.9, ADULT (HCC): ICD-10-CM

## 2020-07-23 DIAGNOSIS — I10 ESSENTIAL HYPERTENSION: ICD-10-CM

## 2020-07-23 DIAGNOSIS — I89.0 LYMPHEDEMA: ICD-10-CM

## 2020-07-23 PROCEDURE — 3044F HG A1C LEVEL LT 7.0%: CPT | Performed by: INTERNAL MEDICINE

## 2020-07-23 PROCEDURE — 3074F SYST BP LT 130 MM HG: CPT | Performed by: INTERNAL MEDICINE

## 2020-07-23 PROCEDURE — 99204 OFFICE O/P NEW MOD 45 MIN: CPT | Performed by: INTERNAL MEDICINE

## 2020-07-23 PROCEDURE — 3008F BODY MASS INDEX DOCD: CPT | Performed by: INTERNAL MEDICINE

## 2020-07-23 PROCEDURE — 3079F DIAST BP 80-89 MM HG: CPT | Performed by: INTERNAL MEDICINE

## 2020-07-23 PROCEDURE — 3066F NEPHROPATHY DOC TX: CPT | Performed by: INTERNAL MEDICINE

## 2020-07-23 RX ORDER — ZOLPIDEM TARTRATE 10 MG/1
10 TABLET ORAL AS NEEDED
Qty: 1 TABLET | Refills: 0 | Status: SHIPPED | OUTPATIENT
Start: 2020-07-23 | End: 2020-09-01

## 2020-07-23 NOTE — PROGRESS NOTES
Review of Systems      Genitourinary need to urinate more than twice a night and difficulty with erection   Cardiology Frequent chest pain or angina,  and ankle/leg swelling   Gastrointestinal none   Neurology need to move extremities, numbness/tingling of an extremity and balance problems   Constitutional fatigue   Integumentary rash or dry skin and itching   Psychiatry anxiety and depression   Musculoskeletal none   Pulmonary shortness of breath with activity, wheezing, snoring and difficulty breathing when lying flat    ENT throat clearing and ringing in ears   Endocrine none   Hematological none

## 2020-07-23 NOTE — PATIENT INSTRUCTIONS
What is STEFF? Obstructive sleep apnea is a common and serious sleep disorder that causes you to stop breathing during sleep  The airway repeatedly becomes blocked, limiting the amount of air that reaches your lungs  When this happens, you may snore loudly or making choking noises as you try to breathe  Your brain and body becomes oxygen deprived and you may wake up  This may happen a few times a night, or in more severe cases, several hundred times a night  Sleep apnea can make you wake up in the morning feeling tired or unrefreshed even though you have had a full night of sleep  During the day, you may feel fatigued, have difficulty concentrating or you may even unintentionally fall asleep  This is because your body is waking up numerous times throughout the night, even though you might not be conscious of each awakening  The lack of oxygen your body receives can have negative long-term consequences for your health  This includes:  High blood pressure  Heart disease  Irregular heart rhythms  Stroke  Pre-diabetes and diabetes  Depression    Testing  An objective evaluation of your sleep may be needed before your board certified sleep physician can make a diagnosis  Options include:   In-lab overnight sleep study  This type of sleep study requires you to stay overnight at a sleep center, in a bed that may resemble a hotel room  You will sleep with sensors hooked up to various parts of your body  These sensors record your brain waves, heartbeat, breathing and movement  An overnight sleep study also provides your doctor with the most complete information about your sleep  Learn more about an overnight sleep study      Home sleep apnea test  Some patients with high risk factors for obstructive sleep apnea and no other medical disorders may be candidates for a home sleep apnea test  The testing equipment differs in that it is less complicated than what is used in an overnight sleep study   As such, does not give all the data an in-lab will and if negative, may not mean you do not have the problem  Treatment for sleep apnea  includes using a continuous positive airway pressure (CPAP) machine to keep your airway open during sleep  A mask is placed over your nose and mouth, or just your nose  The mask is hooked to the CPAP machine that blows a gentle stream of air into the mask when you breathe  This helps keep your airway open so you can breathe more regularly  Extra oxygen may be given to you through the machine  You may be given a mouth device  It looks like a mouth guard or dental retainer and stops your tongue and mouth tissues from blocking your throat while you sleep  Surgery may be needed to remove extra tissues that block your mouth, throat, or nose  Manage sleep apnea:   Do not smoke  Nicotine and other chemicals in cigarettes and cigars can cause lung damage  Ask your healthcare provider for information if you currently smoke and need help to quit  E-cigarettes or smokeless tobacco still contain nicotine  Talk to your healthcare provider before you use these products  Do not drink alcohol or take sedative medicine before you go to sleep  Alcohol and sedatives can relax the muscles and tissues around your throat  This can block the airflow to your lungs  Maintain a healthy weight  Excess tissue around your throat may restrict your breathing  Ask your healthcare provider for information if you need to lose weight  Sleep on your side or use pillows designed to prevent sleep apnea  This prevents your tongue or other tissues from blocking your throat  You can also raise the head of your bed  Driving Safety  Refrain from driving when drowsy  Follow up with your healthcare provider as directed:  Write down your questions so you remember to ask them during your visits  Go to AASM website for more information: Sleepeducation  org     What is STEFF?    Obstructive sleep apnea is a common and serious sleep disorder that causes you to stop breathing during sleep  The airway repeatedly becomes blocked, limiting the amount of air that reaches your lungs  When this happens, you may snore loudly or making choking noises as you try to breathe  Your brain and body becomes oxygen deprived and you may wake up  This may happen a few times a night, or in more severe cases, several hundred times a night  Sleep apnea can make you wake up in the morning feeling tired or unrefreshed even though you have had a full night of sleep  During the day, you may feel fatigued, have difficulty concentrating or you may even unintentionally fall asleep  This is because your body is waking up numerous times throughout the night, even though you might not be conscious of each awakening  The lack of oxygen your body receives can have negative long-term consequences for your health  This includes:  High blood pressure  Heart disease  Irregular heart rhythms  Stroke  Pre-diabetes and diabetes  Depression    Testing  An objective evaluation of your sleep may be needed before your board certified sleep physician can make a diagnosis  Options include:   In-lab overnight sleep study  This type of sleep study requires you to stay overnight at a sleep center, in a bed that may resemble a hotel room  You will sleep with sensors hooked up to various parts of your body  These sensors record your brain waves, heartbeat, breathing and movement  An overnight sleep study also provides your doctor with the most complete information about your sleep  Learn more about an overnight sleep study      Home sleep apnea test  Some patients with high risk factors for obstructive sleep apnea and no other medical disorders may be candidates for a home sleep apnea test  The testing equipment differs in that it is less complicated than what is used in an overnight sleep study   As such, does not give all the data an in-lab will and if negative, may not mean you do not have the problem  Treatment for sleep apnea  includes using a continuous positive airway pressure (CPAP) machine to keep your airway open during sleep  A mask is placed over your nose and mouth, or just your nose  The mask is hooked to the CPAP machine that blows a gentle stream of air into the mask when you breathe  This helps keep your airway open so you can breathe more regularly  Extra oxygen may be given to you through the machine  You may be given a mouth device  It looks like a mouth guard or dental retainer and stops your tongue and mouth tissues from blocking your throat while you sleep  Surgery may be needed to remove extra tissues that block your mouth, throat, or nose  Manage sleep apnea:   Do not smoke  Nicotine and other chemicals in cigarettes and cigars can cause lung damage  Ask your healthcare provider for information if you currently smoke and need help to quit  E-cigarettes or smokeless tobacco still contain nicotine  Talk to your healthcare provider before you use these products  Do not drink alcohol or take sedative medicine before you go to sleep  Alcohol and sedatives can relax the muscles and tissues around your throat  This can block the airflow to your lungs  Maintain a healthy weight  Excess tissue around your throat may restrict your breathing  Ask your healthcare provider for information if you need to lose weight  Sleep on your side or use pillows designed to prevent sleep apnea  This prevents your tongue or other tissues from blocking your throat  You can also raise the head of your bed  Driving Safety  Refrain from driving when drowsy  Follow up with your healthcare provider as directed:  Write down your questions so you remember to ask them during your visits  Go to AASM website for more information: Sleepeducation  org       What you can do to improve your sleep: (Sleep Hygiene) Basic rules for a good night's sleep    Create a regular sleep schedule    This will help you form a sleep routine  Keep a record of your sleep patterns, and any sleeping problems you have  Bring the record to follow-up visits with healthcare providers  Avoid prolonged use of light-emitting screens before bedtime or watching TV in bed  Avoid forcing sleep  Do not take naps  Naps could make it hard for you to fall asleep at bedtime  Deal with your worries before bedtime  Keep your bedroom cool, quiet, and dark  Turn on white noise, such as a fan, to help you relax  Do not use your bed for any activity that will keep you awake  Do not read, exercise, eat, or watch TV in your bedroom  Get up if you do not fall asleep within 20 minutes  Move to another room and do something relaxing until you become sleepy  Limit caffeine, alcohol, nicotine and food to earlier in the day  Only drink caffeine in the morning  Do not drink alcohol within 6 hours of bedtime  Do not eat a heavy meal right before you go to bed  Avoid smoking, especially in the evening  Exercise regularly  Daily exercise will help you sleep better  Do not exercise within 4 hours of bedtime  Stimulus control therapy rules  1  Go to bed only when sleepy  2  Do not watch television, read, eat, or worry while in bed  Use bed only for sleep and sex  3  Get out of bed if unable to fall asleep within 20 minutes and go to another room  Return to bed only when sleepy  Repeat this step as many times as necessary throughout the night  4  Set an alarm clock to wake up at a fixed time each morning, including weekends  5  Do not take a nap during the day  Data from: [address] [place] [address], [address] Nonpharmacologic treatments of insomnia  J [facility] Psychiat[facility] 0247; 53:37  Go to AASM website for more information: Sleepeducation  org     Recommended Reading:  Book by authors [address]   No More sleepless nights          Nursing Support:  When: Monday through Friday 7A-5PM except holidays  Where: Our direct line is [phone]  If you are having a true emergency please call 911  In the event that the line is busy or it is after hours please leave a voice message and we will return your call  Please speak clearly, leaving your full name, birth date, best number to reach you and the reason for your call  Medication refills: We will need the name of the medication, the dosage, the ordering provider, whether you get a 30 or 90 day refill, and the pharmacy name and address  Medications will be ordered by the provider only  Nurses cannot call in prescriptions  Please allow 7 days for medication refills  Physician requested updates: If your provider requested that you call with an update after starting medication, please be ready to provide us the medication and dosage, what time you take your medication, the time you attempt to fall asleep, time you fall asleep, when you wake up, and what time you get out of bed  Sleep Study Results: We will contact you with sleep study results and/or next steps after the physician has reviewed your testing

## 2020-07-23 NOTE — PROGRESS NOTES
Consultation - [facility]  63 y o  male  MQM:[birth year]  HCW:[id number]    Physician Requesting Consult: Self, Referral     Reason for Consult : At your kind request I saw this patient for initial sleep evaluation today  Patient recently had a diagnostic sleep study and is here to review results / treatment options  The study demonstrated severe obstructive sleep apnea: AHI 44 2 /hour , higher during REM at 57 1 per hour  Minimum oxygen saturation 77 %  and 65 minutes of total sleep time was spent with saturations less than 90%  Sleep efficiency was reduced at 49 9%  ECG demonstrated atrial fibrillation  He was planned for an in-lab titration study that had to be postponed because results of COVID-19 testing did not come back in time for the study  PFSH, Problem List, Medications & Allergies were reviewed in EMR  He  has a past medical history of A-fib [facility]), Cardiac disease, Chronic combined systolic (congestive) and diastolic (congestive) heart failure ([address] ), Diabetes mellitus ([facility] ), Dilated cardiomyopathy ([facility] ), History of echocardiogram (11/24/2014), History of Lexiscan MPI (02/19/2016), echocardiogram (04/28/2017), recurrent pneumonia, Hypertension, Long term (current) use of anticoagulants, Morbid (severe) obesity due to excess calories ([facility] ), and Neuropathy  He has a current medication list which includes the following prescription(s): carvedilol, digoxin, furosemide, lisinopril, metformin, methocarbamol, metolazone, omeprazole, warfarin, and zolpidem  HPI:  Study was undertaken for his complaint of can not sleep that he has been struggling with all my life   He also reports loud snoring that disturbs his wife was witnessed apneas  He is not aware of snoring, breathing difficulties during sleep or modifying factors    Other Complaints:  My brain will not shut off " Restless Leg Syndrome: reports no suggestive symptoms    Parasomnia: no features reported Sleep Routine:   Typical Bedtime:  9:00 p m  Gets OOB:  5:00 a m  TIB:8 hrs  Sleep latency: several hours  He denied use of media in the bedroom  Sleep Interruptions:frequent/nite and struggles to fall back asleep  Awakens: with aid of an alarm  Upon awakening: never feels rested  He estimates getting 2-8 hrs sleep  He has Excessive Daytime Sleepiness and naps when he has the opportunity  [place] Sleepiness Scale rated at Total score: 8 /24  Habits: reports that he has been smoking  He has never used smokeless tobacco  , reports that he drinks alcohol  ,  reports that he does not use drugs  ,Caffeine use:limited , Exercise routine: none because he is unable to  Family History: Negative for sleep disturbance  ROS - constitutional, psychiatric, ENT, respiratory,CVS, GI, UGS, CNS, MSK, integumentary, endocrine, hematological reviewed- see attached  [place] EXAM:  /80 (BP Location: Left arm, Cuff Size: Large)   Pulse 70   Temp 99 °F (37 2 °C) (Tympanic)   Ht 5' 10" (1 778 m)   Wt (!) 153 kg (338 lb)   SpO2 96%   BMI 48 50 kg/m²    General: Well groomed male, well appearing, in no apparent distress  Psychiatric: Alert and orientated; Cooperative; Speech:clear and coherent; Normal mood, affect & thought   HEENT:  Craniofacial anatomy: normal Sinuses: non- tender  TMJ: Normal    Eyes: EOM's intact;  conjunctiva/corneas clear   Ears: Externallyappear normal     Nasal Airway: is patent Septum:intact; Mucosa: normal; Turbinates: normal; Rhinorrhea: None   Mouth: Lips: normal posture; Dentition: normal   Mucosa:moist  ; Hard Palate:normal    Oropharryx: crowded, AP narrowing  and laterally narrowedTongue: Mallampati:Class IV and MobileSoft Palate:  redundant  Tonsils: no hypertrophy  Neck:is thick and excess fatty tissue; Neck Circumference: 17 5 "; Supple; no abnormal masses;  Thyroid:normal  Trachea:central     Lymph: No Cervical or Submandibular Lymhadenopathy  Heart: S1,S2 normal; irregularly regular; no gallop; 2/6 ejection systolic murmur   Lungs: Respiratory Effort:normal  Air entry good bilaterally  No wheezes  No rales  Abdomen: Obese, Soft & non-tender    Extremities: 3+ pedal edema  No clubbing or cyanosis  Skin: warm and dry; Color& Hydration good; no facial rashes or lesions   Neurological: CNII-XII intact; Motor normal; Sensation normal  Musculoskeletal: Muscle bulk, tone and power WNL Gait:normal        COVID-19 antigen test on July 7 was negative    IMPRESSION: Primary, Secondary Sleep Diagnoses (to Medical or Psych conditions) & Comorbidities   1  STEFF (obstructive sleep apnea)  zolpidem (AMBIEN) 10 mg tablet    CPAP Study   2  Other insomnia     3  Chronic diastolic congestive heart failure (HCC)  CPAP Study   4  Permanent atrial fibrillation ([facility] )     5  Essential hypertension     6  Pulmonary hypertension (HCC)     7  Lymphedema     8  Morbid obesity with BMI of 40 0-44 9, adult ([facility] )          PLAN:   1  I reviewed results of the Sleep study with the patient  2  With respect to above conditions, I counseled on pathophysiology, diagnosis, treatment options, risks and benefits; inter-relationship and effects on symptoms and comorbidities; risks of no treatment; costs and insurance aspects  3  Patient elected positive airway pressure therapy and is to be scheduled for a titration study  4  Cognitive behavioral therapy was initiated, Sleep Hygiene and behavioral techniques to manage Insomnia were discussed  5  I also advised on weight reduction  6  Follow-up to be scheduled after the study to review results and to initiate therapy             Sincerely,     Authenticated electronically by Jamil Knapp MD   on 34/05/70   Board Certified Specialist

## 2020-07-28 ENCOUNTER — TELEPHONE (OUTPATIENT)
Dept: OTOLARYNGOLOGY | Facility: CLINIC | Age: 60
End: 2020-07-28

## 2020-07-28 NOTE — TELEPHONE ENCOUNTER
Pt was seen in the clinic and was notified that he would have to have a covid test 10 days prier to the study  Gayatri Sol the reminder letter and also the the date that test should be done

## 2020-08-12 ENCOUNTER — ANTICOAG VISIT (OUTPATIENT)
Dept: CARDIOLOGY CLINIC | Facility: CLINIC | Age: 60
End: 2020-08-12
Payer: MEDICARE

## 2020-08-12 DIAGNOSIS — I48.21 PERMANENT ATRIAL FIBRILLATION (HCC): ICD-10-CM

## 2020-08-12 DIAGNOSIS — Z79.01 LONG TERM (CURRENT) USE OF ANTICOAGULANTS: ICD-10-CM

## 2020-08-12 LAB — SL AMB POCT INR: 1.9

## 2020-08-12 PROCEDURE — 93793 ANTICOAG MGMT PT WARFARIN: CPT | Performed by: PHYSICIAN ASSISTANT

## 2020-08-12 PROCEDURE — 85610 PROTHROMBIN TIME: CPT | Performed by: PHYSICIAN ASSISTANT

## 2020-08-12 NOTE — Clinical Note
Please have him take one full tablet tonight to make up for missed dose, then resume usual dosing   2 week INR appt

## 2020-08-21 ENCOUNTER — TELEPHONE (OUTPATIENT)
Dept: GASTROENTEROLOGY | Facility: CLINIC | Age: 60
End: 2020-08-21

## 2020-08-21 ENCOUNTER — HOSPITAL ENCOUNTER (INPATIENT)
Facility: HOSPITAL | Age: 60
LOS: 6 days | Discharge: HOME | DRG: 291 | End: 2020-08-27
Attending: EMERGENCY MEDICINE | Admitting: FAMILY MEDICINE
Payer: MEDICARE

## 2020-08-21 ENCOUNTER — APPOINTMENT (EMERGENCY)
Dept: CT IMAGING | Facility: HOSPITAL | Age: 60
DRG: 291 | End: 2020-08-21
Payer: MEDICARE

## 2020-08-21 DIAGNOSIS — J90 PLEURAL EFFUSION ON RIGHT: ICD-10-CM

## 2020-08-21 DIAGNOSIS — N28.9 RENAL INSUFFICIENCY: ICD-10-CM

## 2020-08-21 DIAGNOSIS — R18.8 ASCITES: ICD-10-CM

## 2020-08-21 DIAGNOSIS — E87.1 HYPONATREMIA: ICD-10-CM

## 2020-08-21 DIAGNOSIS — N18.30 CKD (CHRONIC KIDNEY DISEASE) STAGE 3, GFR 30-59 ML/MIN (HCC): ICD-10-CM

## 2020-08-21 DIAGNOSIS — R60.1 ANASARCA: ICD-10-CM

## 2020-08-21 DIAGNOSIS — E87.5 HYPERKALEMIA: Primary | ICD-10-CM

## 2020-08-21 PROBLEM — D64.9 CHRONIC ANEMIA: Status: ACTIVE | Noted: 2020-08-21

## 2020-08-21 PROBLEM — E66.01 CLASS 3 SEVERE OBESITY DUE TO EXCESS CALORIES WITH SERIOUS COMORBIDITY AND BODY MASS INDEX (BMI) OF 45.0 TO 49.9 IN ADULT (HCC): Status: ACTIVE | Noted: 2018-10-17

## 2020-08-21 PROBLEM — R06.02 SOB (SHORTNESS OF BREATH): Status: ACTIVE | Noted: 2020-08-21

## 2020-08-21 PROBLEM — I50.33 ACUTE ON CHRONIC DIASTOLIC CONGESTIVE HEART FAILURE (HCC): Status: ACTIVE | Noted: 2018-10-17

## 2020-08-21 PROBLEM — K74.60 CIRRHOSIS OF LIVER WITH ASCITES (HCC): Status: ACTIVE | Noted: 2020-08-21

## 2020-08-21 PROBLEM — Z72.0 TOBACCO ABUSE: Status: ACTIVE | Noted: 2020-08-21

## 2020-08-21 LAB
ALBUMIN SERPL BCP-MCNC: 3.1 G/DL (ref 3.5–5)
ALP SERPL-CCNC: 132 U/L (ref 46–116)
ALT SERPL W P-5'-P-CCNC: 21 U/L (ref 12–78)
ANION GAP SERPL CALCULATED.3IONS-SCNC: 7 MMOL/L (ref 4–13)
ANION GAP SERPL CALCULATED.3IONS-SCNC: 8 MMOL/L (ref 4–13)
APTT PPP: 45 SECONDS (ref 23–37)
AST SERPL W P-5'-P-CCNC: 30 U/L (ref 5–45)
BASOPHILS # BLD AUTO: 0.06 THOUSANDS/ΜL (ref 0–0.1)
BASOPHILS NFR BLD AUTO: 1 % (ref 0–1)
BILIRUB SERPL-MCNC: 0.7 MG/DL (ref 0.2–1)
BUN SERPL-MCNC: 24 MG/DL (ref 5–25)
BUN SERPL-MCNC: 25 MG/DL (ref 5–25)
CALCIUM SERPL-MCNC: 8.7 MG/DL (ref 8.3–10.1)
CALCIUM SERPL-MCNC: 8.7 MG/DL (ref 8.3–10.1)
CHLORIDE SERPL-SCNC: 100 MMOL/L (ref 100–108)
CHLORIDE SERPL-SCNC: 99 MMOL/L (ref 100–108)
CO2 SERPL-SCNC: 25 MMOL/L (ref 21–32)
CO2 SERPL-SCNC: 25 MMOL/L (ref 21–32)
CREAT SERPL-MCNC: 1.69 MG/DL (ref 0.6–1.3)
CREAT SERPL-MCNC: 1.7 MG/DL (ref 0.6–1.3)
D DIMER PPP FEU-MCNC: 1.21 UG/ML FEU
EOSINOPHIL # BLD AUTO: 0.17 THOUSAND/ΜL (ref 0–0.61)
EOSINOPHIL NFR BLD AUTO: 3 % (ref 0–6)
ERYTHROCYTE [DISTWIDTH] IN BLOOD BY AUTOMATED COUNT: 14.5 % (ref 11.6–15.1)
GFR SERPL CREATININE-BSD FRML MDRD: 43 ML/MIN/1.73SQ M
GFR SERPL CREATININE-BSD FRML MDRD: 43 ML/MIN/1.73SQ M
GLUCOSE SERPL-MCNC: 113 MG/DL (ref 65–140)
GLUCOSE SERPL-MCNC: 116 MG/DL (ref 65–140)
GLUCOSE SERPL-MCNC: 117 MG/DL (ref 65–140)
GLUCOSE SERPL-MCNC: 128 MG/DL (ref 65–140)
GLUCOSE SERPL-MCNC: 138 MG/DL (ref 65–140)
HCT VFR BLD AUTO: 30.2 % (ref 36.5–49.3)
HGB BLD-MCNC: 9.7 G/DL (ref 12–17)
IMM GRANULOCYTES # BLD AUTO: 0.04 THOUSAND/UL (ref 0–0.2)
IMM GRANULOCYTES NFR BLD AUTO: 1 % (ref 0–2)
INR PPP: 2.12 (ref 0.84–1.19)
LYMPHOCYTES # BLD AUTO: 0.82 THOUSANDS/ΜL (ref 0.6–4.47)
LYMPHOCYTES NFR BLD AUTO: 14 % (ref 14–44)
MCH RBC QN AUTO: 34.2 PG (ref 26.8–34.3)
MCHC RBC AUTO-ENTMCNC: 32.1 G/DL (ref 31.4–37.4)
MCV RBC AUTO: 106 FL (ref 82–98)
MONOCYTES # BLD AUTO: 0.49 THOUSAND/ΜL (ref 0.17–1.22)
MONOCYTES NFR BLD AUTO: 8 % (ref 4–12)
NEUTROPHILS # BLD AUTO: 4.23 THOUSANDS/ΜL (ref 1.85–7.62)
NEUTS SEG NFR BLD AUTO: 73 % (ref 43–75)
NRBC BLD AUTO-RTO: 0 /100 WBCS
PLATELET # BLD AUTO: 150 THOUSANDS/UL (ref 149–390)
PMV BLD AUTO: 10 FL (ref 8.9–12.7)
POTASSIUM SERPL-SCNC: 5.7 MMOL/L (ref 3.5–5.3)
POTASSIUM SERPL-SCNC: 5.7 MMOL/L (ref 3.5–5.3)
PROCALCITONIN SERPL-MCNC: 0.17 NG/ML
PROT SERPL-MCNC: 7.6 G/DL (ref 6.4–8.2)
PROTHROMBIN TIME: 23.3 SECONDS (ref 11.6–14.5)
RBC # BLD AUTO: 2.84 MILLION/UL (ref 3.88–5.62)
SODIUM SERPL-SCNC: 132 MMOL/L (ref 136–145)
SODIUM SERPL-SCNC: 132 MMOL/L (ref 136–145)
TROPONIN I SERPL-MCNC: 0.02 NG/ML
WBC # BLD AUTO: 5.81 THOUSAND/UL (ref 4.31–10.16)

## 2020-08-21 PROCEDURE — 80048 BASIC METABOLIC PNL TOTAL CA: CPT | Performed by: EMERGENCY MEDICINE

## 2020-08-21 PROCEDURE — G1004 CDSM NDSC: HCPCS

## 2020-08-21 PROCEDURE — 71275 CT ANGIOGRAPHY CHEST: CPT

## 2020-08-21 PROCEDURE — 86803 HEPATITIS C AB TEST: CPT | Performed by: PHYSICIAN ASSISTANT

## 2020-08-21 PROCEDURE — 86705 HEP B CORE ANTIBODY IGM: CPT | Performed by: PHYSICIAN ASSISTANT

## 2020-08-21 PROCEDURE — 93005 ELECTROCARDIOGRAM TRACING: CPT

## 2020-08-21 PROCEDURE — 86704 HEP B CORE ANTIBODY TOTAL: CPT | Performed by: PHYSICIAN ASSISTANT

## 2020-08-21 PROCEDURE — 85379 FIBRIN DEGRADATION QUANT: CPT | Performed by: EMERGENCY MEDICINE

## 2020-08-21 PROCEDURE — 99223 1ST HOSP IP/OBS HIGH 75: CPT | Performed by: PHYSICIAN ASSISTANT

## 2020-08-21 PROCEDURE — 36415 COLL VENOUS BLD VENIPUNCTURE: CPT | Performed by: EMERGENCY MEDICINE

## 2020-08-21 PROCEDURE — 99285 EMERGENCY DEPT VISIT HI MDM: CPT | Performed by: EMERGENCY MEDICINE

## 2020-08-21 PROCEDURE — 96365 THER/PROPH/DIAG IV INF INIT: CPT

## 2020-08-21 PROCEDURE — 84484 ASSAY OF TROPONIN QUANT: CPT | Performed by: EMERGENCY MEDICINE

## 2020-08-21 PROCEDURE — 85025 COMPLETE CBC W/AUTO DIFF WBC: CPT | Performed by: EMERGENCY MEDICINE

## 2020-08-21 PROCEDURE — 84145 PROCALCITONIN (PCT): CPT | Performed by: PHYSICIAN ASSISTANT

## 2020-08-21 PROCEDURE — 85730 THROMBOPLASTIN TIME PARTIAL: CPT | Performed by: EMERGENCY MEDICINE

## 2020-08-21 PROCEDURE — 80053 COMPREHEN METABOLIC PANEL: CPT | Performed by: EMERGENCY MEDICINE

## 2020-08-21 PROCEDURE — 85610 PROTHROMBIN TIME: CPT | Performed by: EMERGENCY MEDICINE

## 2020-08-21 PROCEDURE — 87340 HEPATITIS B SURFACE AG IA: CPT | Performed by: PHYSICIAN ASSISTANT

## 2020-08-21 PROCEDURE — 74177 CT ABD & PELVIS W/CONTRAST: CPT

## 2020-08-21 PROCEDURE — 96375 TX/PRO/DX INJ NEW DRUG ADDON: CPT

## 2020-08-21 PROCEDURE — 99285 EMERGENCY DEPT VISIT HI MDM: CPT

## 2020-08-21 PROCEDURE — 82948 REAGENT STRIP/BLOOD GLUCOSE: CPT

## 2020-08-21 RX ORDER — DEXTROSE MONOHYDRATE 25 G/50ML
25 INJECTION, SOLUTION INTRAVENOUS ONCE
Status: COMPLETED | OUTPATIENT
Start: 2020-08-21 | End: 2020-08-21

## 2020-08-21 RX ORDER — SODIUM POLYSTYRENE SULFONATE 4.1 MEQ/G
15 POWDER, FOR SUSPENSION ORAL; RECTAL ONCE
Status: COMPLETED | OUTPATIENT
Start: 2020-08-21 | End: 2020-08-21

## 2020-08-21 RX ORDER — CARVEDILOL 3.12 MG/1
6.25 TABLET ORAL 2 TIMES DAILY WITH MEALS
Status: DISCONTINUED | OUTPATIENT
Start: 2020-08-21 | End: 2020-08-27 | Stop reason: HOSPADM

## 2020-08-21 RX ORDER — FUROSEMIDE 10 MG/ML
40 INJECTION INTRAMUSCULAR; INTRAVENOUS
Status: DISCONTINUED | OUTPATIENT
Start: 2020-08-21 | End: 2020-08-23

## 2020-08-21 RX ORDER — PANTOPRAZOLE SODIUM 40 MG/1
40 TABLET, DELAYED RELEASE ORAL
Status: DISCONTINUED | OUTPATIENT
Start: 2020-08-22 | End: 2020-08-27 | Stop reason: HOSPADM

## 2020-08-21 RX ORDER — ALBUTEROL SULFATE 2.5 MG/3ML
10 SOLUTION RESPIRATORY (INHALATION) ONCE
Status: COMPLETED | OUTPATIENT
Start: 2020-08-21 | End: 2020-08-21

## 2020-08-21 RX ORDER — DIGOXIN 250 MCG
250 TABLET ORAL DAILY
Status: DISCONTINUED | OUTPATIENT
Start: 2020-08-21 | End: 2020-08-27 | Stop reason: HOSPADM

## 2020-08-21 RX ORDER — LISINOPRIL 10 MG/1
10 TABLET ORAL DAILY
Status: DISCONTINUED | OUTPATIENT
Start: 2020-08-22 | End: 2020-08-22

## 2020-08-21 RX ORDER — DOCUSATE SODIUM 100 MG/1
100 CAPSULE, LIQUID FILLED ORAL 2 TIMES DAILY
Status: DISCONTINUED | OUTPATIENT
Start: 2020-08-21 | End: 2020-08-27 | Stop reason: HOSPADM

## 2020-08-21 RX ORDER — WARFARIN SODIUM 2.5 MG/1
2.5 TABLET ORAL
Status: COMPLETED | OUTPATIENT
Start: 2020-08-21 | End: 2020-08-21

## 2020-08-21 RX ORDER — CALCIUM GLUCONATE 20 MG/ML
1 INJECTION, SOLUTION INTRAVENOUS ONCE
Status: COMPLETED | OUTPATIENT
Start: 2020-08-21 | End: 2020-08-21

## 2020-08-21 RX ORDER — ONDANSETRON 2 MG/ML
4 INJECTION INTRAMUSCULAR; INTRAVENOUS EVERY 6 HOURS PRN
Status: DISCONTINUED | OUTPATIENT
Start: 2020-08-21 | End: 2020-08-27 | Stop reason: HOSPADM

## 2020-08-21 RX ADMIN — SODIUM POLYSTYRENE SULFONATE 15 G: 1 POWDER ORAL; RECTAL at 13:27

## 2020-08-21 RX ADMIN — SODIUM CHLORIDE 250 ML: 0.9 INJECTION, SOLUTION INTRAVENOUS at 13:22

## 2020-08-21 RX ADMIN — FUROSEMIDE 40 MG: 10 INJECTION, SOLUTION INTRAMUSCULAR; INTRAVENOUS at 16:12

## 2020-08-21 RX ADMIN — INSULIN HUMAN 10 UNITS: 100 INJECTION, SOLUTION PARENTERAL at 13:27

## 2020-08-21 RX ADMIN — DIGOXIN 250 MCG: 250 TABLET ORAL at 16:11

## 2020-08-21 RX ADMIN — ALBUTEROL SULFATE 10 MG: 2.5 SOLUTION RESPIRATORY (INHALATION) at 13:25

## 2020-08-21 RX ADMIN — IOHEXOL 100 ML: 350 INJECTION, SOLUTION INTRAVENOUS at 12:45

## 2020-08-21 RX ADMIN — WARFARIN SODIUM 2.5 MG: 2.5 TABLET ORAL at 17:34

## 2020-08-21 RX ADMIN — SODIUM BICARBONATE 50 MEQ: 84 INJECTION, SOLUTION INTRAVENOUS at 13:22

## 2020-08-21 RX ADMIN — DEXTROSE MONOHYDRATE 25 ML: 25 INJECTION, SOLUTION INTRAVENOUS at 13:21

## 2020-08-21 RX ADMIN — DOCUSATE SODIUM 100 MG: 100 CAPSULE, LIQUID FILLED ORAL at 17:34

## 2020-08-21 RX ADMIN — CARVEDILOL 6.25 MG: 3.12 TABLET, FILM COATED ORAL at 16:11

## 2020-08-21 RX ADMIN — CALCIUM GLUCONATE 1 G: 20 INJECTION, SOLUTION INTRAVENOUS at 13:21

## 2020-08-21 NOTE — ASSESSMENT & PLAN NOTE
On Coumadin and Coreg 6 25 b i d  INR goal 2-3  Coumadin dose 2 5 mg daily, except 5 mg on Monday and Thursday

## 2020-08-21 NOTE — ASSESSMENT & PLAN NOTE
CTA PE study showed cirrhotic changes to liver  Trace ascites  No history of cirrhosis  Possibly PETE  He does endorse prior alcoholism and current daily alcohol use however not excessive  Check hepatitis panel

## 2020-08-21 NOTE — ED NOTES
Patient returned from CT scan and given a bedside commode to use the bathroom  He states he cannot stand to use the bathroom anymore       Tom Woodward RN  08/21/20 [id number]

## 2020-08-21 NOTE — PLAN OF CARE
Problem: Potential for Falls  Goal: Patient will remain free of falls  Description: INTERVENTIONS:  - Assess patient frequently for physical needs  -  Identify cognitive and physical deficits and behaviors that affect risk of falls  -  [place] fall precautions as indicated by assessment   - Educate patient/family on patient safety including physical limitations  - Instruct patient to call for assistance with activity based on assessment  - Modify environment to reduce risk of injury  - Consider OT/PT consult to assist with strengthening/mobility  Outcome: Progressing     Problem: PAIN - ADULT  Goal: Verbalizes/displays adequate comfort level or baseline comfort level  Description: Interventions:  - Encourage patient to monitor pain and request assistance  - Assess pain using appropriate pain scale  - Administer analgesics based on type and severity of pain and evaluate response  - Implement non-pharmacological measures as appropriate and evaluate response  - Consider cultural and social influences on pain and pain management  - Notify physician/advanced practitioner if interventions unsuccessful or patient reports new pain  Outcome: Progressing     Problem: SAFETY ADULT  Goal: Patient will remain free of falls  Description: INTERVENTIONS:  - Assess patient frequently for physical needs  -  Identify cognitive and physical deficits and behaviors that affect risk of falls    -  [place] fall precautions as indicated by assessment   - Educate patient/family on patient safety including physical limitations  - Instruct patient to call for assistance with activity based on assessment  - Modify environment to reduce risk of injury  - Consider OT/PT consult to assist with strengthening/mobility  Outcome: Progressing  Goal: Maintain or return to baseline ADL function  Description: INTERVENTIONS:  -  Assess patient's ability to carry out ADLs; assess patient's baseline for ADL function and identify physical deficits which impact ability to perform ADLs (bathing, care of mouth/teeth, toileting, grooming, dressing, etc )  - Assess/evaluate cause of self-care deficits   - Assess range of motion  - Assess patient's mobility; develop plan if impaired  - Assess patient's need for assistive devices and provide as appropriate  - Encourage maximum independence but intervene and supervise when necessary  - Involve family in performance of ADLs  - Assess for home care needs following discharge   - Consider OT consult to assist with ADL evaluation and planning for discharge  - Provide patient education as appropriate  Outcome: Progressing  Goal: Maintain or return mobility status to optimal level  Description: INTERVENTIONS:  - Assess patient's baseline mobility status (ambulation, transfers, stairs, etc )    - Identify cognitive and physical deficits and behaviors that affect mobility  - Identify mobility aids required to assist with transfers and/or ambulation (gait belt, sit-to-stand, lift, walker, cane, etc )  - [place] fall precautions as indicated by assessment  - Record patient progress and toleration of activity level on Mobility SBAR; progress patient to next Phase/Stage  - Instruct patient to call for assistance with activity based on assessment  - Consider rehabilitation consult to assist with strengthening/weightbearing, etc   Outcome: Progressing     Problem: DISCHARGE PLANNING  Goal: Discharge to home or other facility with appropriate resources  Description: INTERVENTIONS:  - Identify barriers to discharge w/patient and caregiver  - Arrange for needed discharge resources and transportation as appropriate  - Identify discharge learning needs (meds, wound care, etc )  - Arrange for interpretive services to assist at discharge as needed  - Refer to Case Management Department for coordinating discharge planning if the patient needs post-hospital services based on physician/advanced practitioner order or complex needs related to functional status, cognitive ability, or social support system  Outcome: Progressing     Problem: Knowledge Deficit  Goal: Patient/family/caregiver demonstrates understanding of disease process, treatment plan, medications, and discharge instructions  Description: Complete learning assessment and assess knowledge base    Interventions:  - Provide teaching at level of understanding  - Provide teaching via preferred learning methods  Outcome: Progressing

## 2020-08-21 NOTE — ASSESSMENT & PLAN NOTE
Lab Results   Component Value Date    HGBA1C 5 5 03/13/2020       Recent Labs     08/21/20  1337   POCGLU 138       Blood Sugar Average: Last 72 hrs:  (P) 138   Noted controlled hemoglobin A1c  Hold patient's metformin for now  ISS

## 2020-08-21 NOTE — PLAN OF CARE
Problem: Potential for Falls  Goal: Patient will remain free of falls  Description: INTERVENTIONS:  - Assess patient frequently for physical needs  -  Identify cognitive and physical deficits and behaviors that affect risk of falls  -  [place] fall precautions as indicated by assessment   - Educate patient/family on patient safety including physical limitations  - Instruct patient to call for assistance with activity based on assessment  - Modify environment to reduce risk of injury  - Consider OT/PT consult to assist with strengthening/mobility  Outcome: Progressing     Problem: PAIN - ADULT  Goal: Verbalizes/displays adequate comfort level or baseline comfort level  Description: Interventions:  - Encourage patient to monitor pain and request assistance  - Assess pain using appropriate pain scale  - Administer analgesics based on type and severity of pain and evaluate response  - Implement non-pharmacological measures as appropriate and evaluate response  - Consider cultural and social influences on pain and pain management  - Notify physician/advanced practitioner if interventions unsuccessful or patient reports new pain  Outcome: Progressing     Problem: SAFETY ADULT  Goal: Patient will remain free of falls  Description: INTERVENTIONS:  - Assess patient frequently for physical needs  -  Identify cognitive and physical deficits and behaviors that affect risk of falls    -  [place] fall precautions as indicated by assessment   - Educate patient/family on patient safety including physical limitations  - Instruct patient to call for assistance with activity based on assessment  - Modify environment to reduce risk of injury  - Consider OT/PT consult to assist with strengthening/mobility  Outcome: Progressing  Goal: Maintain or return to baseline ADL function  Description: INTERVENTIONS:  -  Assess patient's ability to carry out ADLs; assess patient's baseline for ADL function and identify physical deficits which impact ability to perform ADLs (bathing, care of mouth/teeth, toileting, grooming, dressing, etc )  - Assess/evaluate cause of self-care deficits   - Assess range of motion  - Assess patient's mobility; develop plan if impaired  - Assess patient's need for assistive devices and provide as appropriate  - Encourage maximum independence but intervene and supervise when necessary  - Involve family in performance of ADLs  - Assess for home care needs following discharge   - Consider OT consult to assist with ADL evaluation and planning for discharge  - Provide patient education as appropriate  Outcome: Progressing  Goal: Maintain or return mobility status to optimal level  Description: INTERVENTIONS:  - Assess patient's baseline mobility status (ambulation, transfers, stairs, etc )    - Identify cognitive and physical deficits and behaviors that affect mobility  - Identify mobility aids required to assist with transfers and/or ambulation (gait belt, sit-to-stand, lift, walker, cane, etc )  - [place] fall precautions as indicated by assessment  - Record patient progress and toleration of activity level on Mobility SBAR; progress patient to next Phase/Stage  - Instruct patient to call for assistance with activity based on assessment  - Consider rehabilitation consult to assist with strengthening/weightbearing, etc   Outcome: Progressing     Problem: DISCHARGE PLANNING  Goal: Discharge to home or other facility with appropriate resources  Description: INTERVENTIONS:  - Identify barriers to discharge w/patient and caregiver  - Arrange for needed discharge resources and transportation as appropriate  - Identify discharge learning needs (meds, wound care, etc )  - Arrange for interpretive services to assist at discharge as needed  - Refer to Case Management Department for coordinating discharge planning if the patient needs post-hospital services based on physician/advanced practitioner order or complex needs related to functional status, cognitive ability, or social support system  Outcome: Progressing     Problem: Knowledge Deficit  Goal: Patient/family/caregiver demonstrates understanding of disease process, treatment plan, medications, and discharge instructions  Description: Complete learning assessment and assess knowledge base    Interventions:  - Provide teaching at level of understanding  - Provide teaching via preferred learning methods  Outcome: Progressing

## 2020-08-21 NOTE — ASSESSMENT & PLAN NOTE
Presenting potassium 5 7  In the ED, received calcium gluconate, Kayexalate 15 g, insulin    Recheck in a m   Keep on tele until potassium normalizes

## 2020-08-21 NOTE — H&P
H&P- Clint Miles 1960, 63 y o  male MRN: [id number]  Unit/Bed#: 406-01 Encounter: [id number]  Primary Care Provider: Dick Valenzuela DO   Date and time admitted to hospital: 8/21/2020 11:30 AM    Congestive heart failure (CHF) [facility])  Assessment & Plan  Wt Readings from Last 3 Encounters:   08/21/20 (!) 146 kg (320 lb 12 3 oz)   07/23/20 (!) 153 kg (338 lb)   07/13/20 (!) 158 kg (348 lb)       Acute on Chronic CHF  Most recent echo from 01/2020 - 60% EF  Takes 40 mg Lasix daily  Presenting creatinine is at baseline  The patient's weight actually appears below his recent baseline however he does have right pleural effusion  I/O  Daily weights  Will stop patient's 40mg Lasix and place on 40mg BID IV  Tele monitor until K normalizes       * SOB (shortness of breath)  Assessment & Plan  · Presents with increasing abdominal girth x and increasing SOB x 1 week  · VSS  Maintaining saturations  · Suspect SOB is 2/2 volume overload in setting of CHF exacerrbation and cirrhosis, leading to Moderate R pleural effusion  · CTA shows no PE  On Coumadin chronically  · Possible ground-glass opacities  Check procalcitonin  · Also with Cirrhotic changes to liver  Low suspicion for hepatorenal syndrome  · He is chronically overloaded in the setting of CHF  Hyperkalemia  Assessment & Plan  Presenting potassium 5 7  In the ED, received calcium gluconate, Kayexalate 15 g, insulin  Recheck in a m   Keep on tele until potassium normalizes    Cirrhosis of liver with ascites [facility])  Assessment & Plan  CTA PE study showed cirrhotic changes to liver  Trace ascites  No history of cirrhosis  Possibly PETE  He does endorse prior alcoholism and current daily alcohol use however not excessive  Check hepatitis panel  Pleural effusion on right  Assessment & Plan  CTA PE study reveals moderate right pleural effusion  Patient maintaining saturations  Will give diuresis    Consider repeat imaging and if needed IR consult  Tobacco abuse  Assessment & Plan  Reports smoking about 5cig/day  Declining NRT    CKD (chronic kidney disease) stage 3, GFR 30-59 ml/min ([facility])  Assessment & Plan  Patient likely has a diagnosis of CKD given his GFR values on record  His presenting creatinine appears at his baseline  Monitor I/O  Check bladder scan if output is diminished or creatinine increases  STEFF (obstructive sleep apnea)  Assessment & Plan  The patient is being followed as an outpatient  Awaiting CPAP fitting  Hypertension  Assessment & Plan  Home medications are Lasix 40, lisinopril 10 mg, Coreg 6 25 b i d  Class 3 severe obesity due to excess calories with serious comorbidity and body mass index (BMI) of 45 0 to 49 9 in adult [facility])  Assessment & Plan  Would benefit from weight loss  Diabetes mellitus [facility])  Assessment & Plan  Lab Results   Component Value Date    HGBA1C 5 5 03/13/2020       Recent Labs     08/21/20  1337   POCGLU 138       Blood Sugar Average: Last 72 hrs:  (P) 138   Noted controlled hemoglobin A1c  Hold patient's metformin for now  ISS  Atrial fibrillation (HCC)  Assessment & Plan  On Coumadin and Coreg 6 25 b i d  INR goal 2-3  Coumadin dose 2 5 mg daily, except 5 mg on Monday and Thursday  VTE Prophylaxis: Warfarin (Coumadin)  / sequential compression device   Code Status: full code  POLST: There is no POLST form on file for this patient (pre-hospital)  Discussion with family:  Discussed with patient and his wife present in the room    Anticipated Length of Stay:  Patient will be admitted on an Inpatient basis with an anticipated length of stay of  less than 2 midnights  Justification for Hospital Stay:  Pleural effusion    Total Time for Visit, including Counseling / Coordination of Care: 1 hour  Greater than 50% of this total time spent on direct patient counseling and coordination of care      Chief Complaint:   Increased abdominal girth with shortness of breath    History of Present Illness:    Rima Connolly is a 63 y o  male who presents with increased abdominal girth and shortness of breath  The patient has multiple chronic comorbidities including AFib on Coumadin, DM type 2, hypertension, severe STEFF, tobacco abuse, CHF, and CKD  The patient presents to the emergency department because he was getting fitted for pants and his abdomen was reportedly 8in larger than it was 2 weeks ago  He has noted abdominal distention as well as exertional dyspnea  He states he can no longer bend down to touch his shoes  He states he has been eating healthy diet and has only noted weight gain  He denies any provoking factors to the weight gain  He denies recent injuries  He does say he had a recent sickness about a week ago when he had chills overnight however this resolved spontaneously  He denies nausea or vomiting  He denies abdominal pain or tenderness  He states he feels as if the skin over his abdomen is tense  He denies cough or wheeze  His lower extremities are chronically swollen  He denies medication changes  He states he has been compliant with his Lasix and other medications  He is an everyday smoker  He is a frequent drinker however not excessively  He is to have a history of alcoholism  No history of liver disease  No history of hepatitis  Upon presentation, he is maintaining saturations on room air  As his D-dimer was elevated a CTA was performed which showed no PE  He does have a moderate right pleural effusion  There is trace ascites and noted abdominal anasarca  He was found to have mild hyperkalemia and received multiple medications for this in the emergency department  Initial troponin negative  Will put on telemetry until potassium normalizes  Review of Systems:    Review of Systems   Constitutional: Positive for activity change (decreased exercise tolerance) and unexpected weight change  Negative for fatigue and fever     HENT: Negative for congestion and facial swelling  Respiratory: Positive for shortness of breath  Negative for apnea, cough, choking, chest tightness, wheezing and stridor  Cardiovascular: Positive for leg swelling (chronic )  Negative for chest pain and palpitations  Gastrointestinal: Positive for abdominal distention  Negative for abdominal pain, blood in stool, constipation, diarrhea, nausea and vomiting  Endocrine: Negative for cold intolerance  Genitourinary: Negative for decreased urine volume, difficulty urinating, dysuria and hematuria  Musculoskeletal: Negative for arthralgias  Skin: Negative for color change  Neurological: Negative for dizziness and headaches  Psychiatric/Behavioral: Negative for agitation  Past Medical and Surgical History:     Past Medical History:   Diagnosis Date    A-fib [facility])     Cardiac disease     Chronic combined systolic (congestive) and diastolic (congestive) heart failure (HCC)     Diabetes mellitus ([facility] [address] )     Dilated cardiomyopathy ([facility] [address] )     History of echocardiogram 11/24/2014    EF 0 30 (30%), Likely mod LV systolic dysfunction  Likely RV dysfunction as well   History of Lexiscan MPI 02/19/2016    EF 0 43 (43%), no prior MI or ischemia   Hx of echocardiogram 04/28/2017    Normal EF, Normal LV systolic function  Mild concentric LV hypertrophy  Mild mitral and tricuspid regurg   Hx: recurrent pneumonia     Hypertension     Long term (current) use of anticoagulants     Morbid (severe) obesity due to excess calories (HCC)     Neuropathy        Past Surgical History:   Procedure Laterality Date    HERNIA REPAIR      SPLENECTOMY, TOTAL      VASCULAR SURGERY Right     leg       Meds/Allergies:    Prior to Admission medications    Medication Sig Start Date End Date Taking?  Authorizing Provider   carvedilol (COREG) 6 25 mg tablet Take 1 tablet (6 25 mg total) by mouth 2 (two) times a day with meals 3/4/20  Yes Ahmet Chavez, DO   digoxin (LANOXIN) 0 25 mg tablet Take 1 tablet by mouth once daily 4/28/20  Yes Candida Pérez PA-C   furosemide (LASIX) 40 mg tablet Take 1 tablet (40 mg total) by mouth daily 3/4/20  Yes Ahmet Chavez DO   lisinopril (ZESTRIL) 10 mg tablet TAKE 1 TABLET DAILY 1/22/20  Yes Ahmet Chavez DO   metFORMIN (GLUCOPHAGE) 1000 MG tablet Take 1,000 mg by mouth 2 (two) times a day with meals  Yes Historical Provider, MD   metolazone (ZAROXOLYN) 5 mg tablet TAKE 1 TABLET ONE DAY A    WEEK 30 MINUTES BEFORE     LASIX DOSE THAT DAY 1/22/20  Yes Ahmet Chavez DO   omeprazole (PriLOSEC) 40 MG capsule Take 1 capsule (40 mg total) by mouth 2 (two) times a day 6/2/20  Yes [facility], MD   warfarin (COUMADIN) 5 mg tablet Take 1/2 to 1 tablet by oral route daily  5/22/20  Yes Ahmet Chavez DO   zolpidem (AMBIEN) 10 mg tablet Take 1 tablet (10 mg total) by mouth as needed for sleep (Take 1 tablet 30 minutes prior to lights out on night of sleep study ) 7/23/20  Yes Chandrakant Newell MD   methocarbamol (ROBAXIN) 500 mg tablet Take 1 tablet (500 mg total) by mouth 2 (two) times a day as needed for muscle spasms 4/7/20   Adonis Hedrick PA-C     I have reviewed home medications with patient personally  Allergies:    Allergies   Allergen Reactions    Eggs Or Egg-Derived Products GI Intolerance       Social History:     Marital Status: /Civil Union     Substance Use History:   Social History     Substance and Sexual Activity   Alcohol Use Yes    Alcohol/week: 14 0 standard drinks    Types: 5 Glasses of wine, 5 Cans of beer, 4 Shots of liquor per week    Frequency: 2-3 times a week    Drinks per session: 5 or 6    Binge frequency: Never    Comment: WEEKLY     Social History     Tobacco Use   Smoking Status Current Some Day Smoker    Packs/day: 0 25    Types: Cigarettes   Smokeless Tobacco Never Used   Tobacco Comment    2-3 CIGARETTES DAILY     Social History     Substance and Sexual Activity   Drug Use Never       Family History:    non-contributory    Physical Exam:     Vitals:   Blood Pressure: 159/74 (08/21/20 1447)  Pulse: 74 (08/21/20 1447)  Temperature: 97 6 °F (36 4 °C) (08/21/20 1447)  Temp Source: Axillary (08/21/20 1447)  Respirations: 18 (08/21/20 1447)  Height: 5' 11" (180 3 cm) (08/21/20 1447)  Weight - Scale: (!) 165 kg (363 lb 5 1 oz) (08/21/20 1447)  SpO2: 98 % (08/21/20 1447)    Physical Exam  Vitals signs and nursing note reviewed  Exam conducted with a chaperone present  Constitutional:       General: He is not in acute distress  Appearance: He is obese  Comments: The patient is fully conversational on room air  Lying comfortably in bed  HENT:      Head: Normocephalic and atraumatic  Mouth/Throat:      Mouth: Mucous membranes are moist    Cardiovascular:      Rate and Rhythm: Normal rate  Rhythm irregular  Heart sounds: No murmur  Pulmonary:      Effort: Pulmonary effort is normal  No respiratory distress  Breath sounds: No wheezing  Abdominal:      General: There is distension  Palpations: There is no mass  Tenderness: There is no abdominal tenderness  There is no rebound  Comments: Fluid wave slight   Skin:     Coloration: Skin is not jaundiced or pale  Comments: Chronic venous stasis changes of B/L LE   Neurological:      Mental Status: He is alert  Additional Data:     Lab Results: I have personally reviewed pertinent reports        Results from last 7 days   Lab Units 08/21/20  1141   WBC Thousand/uL 5 81   HEMOGLOBIN g/dL 9 7*   HEMATOCRIT % 30 2*   PLATELETS Thousands/uL 150   NEUTROS PCT % 73   LYMPHS PCT % 14   MONOS PCT % 8   EOS PCT % 3     Results from last 7 days   Lab Units 08/21/20  1229 08/21/20  1141   SODIUM mmol/L 132* 132*   POTASSIUM mmol/L 5 7* 5 7*   CHLORIDE mmol/L 100 99*   CO2 mmol/L 25 25   BUN mg/dL 25 24   CREATININE mg/dL 1 69* 1 70*   ANION GAP mmol/L 7 8   CALCIUM mg/dL 8 7 8 7   ALBUMIN g/dL  --  3 1*   TOTAL BILIRUBIN mg/dL  --  0 70   ALK PHOS U/L  --  132*   ALT U/L  --  21   AST U/L  --  30   GLUCOSE RANDOM mg/dL 113 116     Results from last 7 days   Lab Units 08/21/20  1141   INR  2 12*     Results from last 7 days   Lab Units 08/21/20  1337   POC GLUCOSE mg/dl 138               Imaging: I have personally reviewed pertinent reports  PE Study with CT Abdomen and Pelvis with contrast   Final Result by Lauren Bailon MD (08/21 1350)      1  No pulmonary embolism   2  Moderate right pleural effusion with adjacent atelectasis   3  Groundglass nodules in the left lower lobe, infectious versus inflammatory in etiology   4  Cirrhotic morphology   5  Cholelithiasis without cholecystitis   6  Chronic subcapsular splenic collection, consistent with remote trauma   7  Anasarca with trace ascites                     Workstation performed: [id number]             EKG, Pathology, and Other Studies Reviewed on Admission:   · EKG: reviewed ED documentation     Allscripts / Epic Records Reviewed: Yes     ** Please Note: This note has been constructed using a voice recognition system   **

## 2020-08-21 NOTE — ASSESSMENT & PLAN NOTE
Patient likely has a diagnosis of CKD given his GFR values on record  His presenting creatinine appears at his baseline  Monitor I/O  Check bladder scan if output is diminished or creatinine increases

## 2020-08-21 NOTE — ASSESSMENT & PLAN NOTE
CTA PE study reveals moderate right pleural effusion  Patient maintaining saturations  Will give diuresis  Consider repeat imaging and if needed IR consult

## 2020-08-21 NOTE — ASSESSMENT & PLAN NOTE
· Presents with increasing abdominal girth x and increasing SOB x 1 week  · VSS  Maintaining saturations  · Suspect SOB is 2/2 volume overload in setting of CHF exacerrbation and cirrhosis, leading to Moderate R pleural effusion  · CTA shows no PE  On Coumadin chronically  · Possible ground-glass opacities  Check procalcitonin  · Also with Cirrhotic changes to liver  Low suspicion for hepatorenal syndrome  · He is chronically overloaded in the setting of CHF

## 2020-08-21 NOTE — ED PROVIDER NOTES
History  Chief Complaint   Patient presents with    Shortness of Breath     Abdominal bloating for quite some time and shortness of breath for approximately one week  States he's not eating but he's gaining weight   Abdominal Problem     78-year-old male presents complaining of abdominal bloating which he states been going on for quite some time also complains of shortness of breath which been going on for 1 week  He states that although he has been eating slim fast he feels though his abdominal girth is increasing  History provided by:  Patient  Shortness of Breath   Severity:  Mild  Onset quality:  Gradual  Timing:  Intermittent  Progression:  Waxing and waning  Chronicity:  New  Context: activity    Context: not animal exposure, not emotional upset and not fumes    Relieved by:  Nothing  Worsened by:  Nothing  Associated symptoms: no abdominal pain, no chest pain, no claudication, no cough, no diaphoresis and no ear pain    Associated symptoms comment:  Abdominal bloating  Risk factors: no recent alcohol use and no family hx of DVT        Prior to Admission Medications   Prescriptions Last Dose Informant Patient Reported? Taking?   carvedilol (COREG) 6 25 mg tablet  Self No No   Sig: Take 1 tablet (6 25 mg total) by mouth 2 (two) times a day with meals   digoxin (LANOXIN) 0 25 mg tablet  Self No No   Sig: Take 1 tablet by mouth once daily   furosemide (LASIX) 40 mg tablet  Self No No   Sig: Take 1 tablet (40 mg total) by mouth daily   lisinopril (ZESTRIL) 10 mg tablet  Self No No   Sig: TAKE 1 TABLET DAILY   metFORMIN (GLUCOPHAGE) 1000 MG tablet  Self Yes No   Sig: Take 1,000 mg by mouth 2 (two) times a day with meals     methocarbamol (ROBAXIN) 500 mg tablet  Self No No   Sig: Take 1 tablet (500 mg total) by mouth 2 (two) times a day as needed for muscle spasms   metolazone (ZAROXOLYN) 5 mg tablet  Self No No   Sig: TAKE 1 TABLET ONE DAY A    WEEK 30 MINUTES BEFORE     LASIX DOSE THAT DAY omeprazole (PriLOSEC) 40 MG capsule  Self No No   Sig: Take 1 capsule (40 mg total) by mouth 2 (two) times a day   warfarin (COUMADIN) 5 mg tablet  Self No No   Sig: Take 1/2 to 1 tablet by oral route daily  zolpidem (AMBIEN) 10 mg tablet   No No   Sig: Take 1 tablet (10 mg total) by mouth as needed for sleep (Take 1 tablet 30 minutes prior to lights out on night of sleep study )      Facility-Administered Medications: None       Past Medical History:   Diagnosis Date    A-fib [facility])     Cardiac disease     Chronic combined systolic (congestive) and diastolic (congestive) heart failure (HCC)     Diabetes mellitus [facility] [address] )     Dilated cardiomyopathy ([facility] [address] )     History of echocardiogram 11/24/2014    EF 0 30 (30%), Likely mod LV systolic dysfunction  Likely RV dysfunction as well   History of Lexiscan MPI 02/19/2016    EF 0 43 (43%), no prior MI or ischemia   Hx of echocardiogram 04/28/2017    Normal EF, Normal LV systolic function  Mild concentric LV hypertrophy  Mild mitral and tricuspid regurg   Hx: recurrent pneumonia     Hypertension     Long term (current) use of anticoagulants     Morbid (severe) obesity due to excess calories (HCC)     Neuropathy        Past Surgical History:   Procedure Laterality Date    HERNIA REPAIR      SPLENECTOMY, TOTAL      VASCULAR SURGERY Right     leg       History reviewed  No pertinent family history  I have reviewed and agree with the history as documented      E-Cigarette/Vaping    E-Cigarette Use Never User      E-Cigarette/Vaping Substances    Nicotine No     THC No     CBD No     Flavoring No     Other No     Unknown No      Social History     Tobacco Use    Smoking status: Current Some Day Smoker     Packs/day: 0 25     Types: Cigarettes    Smokeless tobacco: Never Used    Tobacco comment: 2-3 CIGARETTES DAILY   Substance Use Topics    Alcohol use: Yes     Comment: WEEKLY    Drug use: No       Review of Systems   Constitutional: Negative for diaphoresis  HENT: Negative for ear pain  Eyes: Negative  Respiratory: Positive for shortness of breath  Negative for cough  Cardiovascular: Negative for chest pain and claudication  Gastrointestinal: Positive for abdominal distention  Negative for abdominal pain  Abdominal bloating   Endocrine: Negative  Genitourinary: Negative  Musculoskeletal: Negative  Skin: Negative  Allergic/Immunologic: Negative  Neurological: Negative  Hematological: Negative  Psychiatric/Behavioral: Negative  All other systems reviewed and are negative  Physical Exam  Physical Exam  Vitals signs reviewed  Constitutional:       General: He is not in acute distress  Appearance: He is not ill-appearing  HENT:      Head: Normocephalic  Mouth/Throat:      Mouth: Mucous membranes are moist    Eyes:      Pupils: Pupils are equal, round, and reactive to light  Neck:      Musculoskeletal: Normal range of motion  Thyroid: No thyromegaly  Cardiovascular:      Rate and Rhythm: Normal rate  Pulmonary:      Effort: Pulmonary effort is normal  No tachypnea or bradypnea  Breath sounds: No decreased breath sounds, wheezing, rhonchi or rales  Chest:      Chest wall: No mass or deformity  Abdominal:      General: Bowel sounds are normal       Tenderness: There is abdominal tenderness  Musculoskeletal: Normal range of motion  Right lower leg: No edema  Left lower leg: No edema  Skin:     General: Skin is warm  Capillary Refill: Capillary refill takes less than 2 seconds  Coloration: Skin is not cyanotic or pale  Neurological:      General: No focal deficit present  Mental Status: He is alert  Cranial Nerves: No cranial nerve deficit  Motor: No weakness  Psychiatric:         Mood and Affect: Mood normal  Mood is not anxious  Behavior: Behavior is not agitated           Vital Signs  ED Triage Vitals   Temperature Pulse Respirations Blood Pressure SpO2   08/21/20 1134 08/21/20 1134 08/21/20 1134 08/21/20 1134 08/21/20 1200   97 5 °F (36 4 °C) 77 20 134/62 97 %      Temp Source Heart Rate Source Patient Position - Orthostatic VS BP Location FiO2 (%)   08/21/20 1134 08/21/20 1134 08/21/20 1200 08/21/20 1134 --   Temporal Monitor Sitting Left arm       Pain Score       08/21/20 1134       1           Vitals:    08/21/20 1134 08/21/20 1200 08/21/20 1215   BP: 134/62 126/60    Pulse: 77 80 90   Patient Position - Orthostatic VS:  Sitting Sitting         Visual Acuity      ED Medications  Medications   sodium chloride 0 9 % bolus 250 mL (has no administration in time range)   iohexol (OMNIPAQUE) 350 MG/ML injection (SINGLE-DOSE) 100 mL (100 mL Intravenous Given 8/21/20 1245)       Diagnostic Studies  Results Reviewed     Procedure Component Value Units Date/Time    Basic metabolic panel [[id number]]  (Abnormal) Collected:  08/21/20 1229    Lab Status:  Final result Specimen:  Blood from Arm, Left Updated:  08/21/20 1241     Sodium 132 mmol/L      Potassium 5 7 mmol/L      Chloride 100 mmol/L      CO2 25 mmol/L      ANION GAP 7 mmol/L      BUN 25 mg/dL      Creatinine 1 69 mg/dL      Glucose 113 mg/dL      Calcium 8 7 mg/dL      eGFR 43 ml/min/1 73sq m     Narrative:       [facility] guidelines for Chronic Kidney Disease (CKD):     Stage 1 with normal or high GFR (GFR > 90 mL/min/1 73 square meters)    Stage 2 Mild CKD (GFR = 60-89 mL/min/1 73 square meters)    Stage 3A Moderate CKD (GFR = 45-59 mL/min/1 73 square meters)    Stage 3B Moderate CKD (GFR = 30-44 mL/min/1 73 square meters)    Stage 4 Severe CKD (GFR = 15-29 mL/min/1 73 square meters)    Stage 5 End Stage CKD (GFR <15 mL/min/1 73 square meters)  Note: GFR calculation is accurate only with a steady state creatinine    Troponin I [[id number]]  (Normal) Collected:  08/21/20 1141    Lab Status:  Final result Specimen:  Blood from Arm, Right Updated:  08/21/20 1203 Troponin I 0 02 ng/mL     Comprehensive metabolic panel [[id number]]  (Abnormal) Collected:  08/21/20 1141    Lab Status:  Final result Specimen:  Blood from Arm, Right Updated:  08/21/20 1203     Sodium 132 mmol/L      Potassium 5 7 mmol/L      Chloride 99 mmol/L      CO2 25 mmol/L      ANION GAP 8 mmol/L      BUN 24 mg/dL      Creatinine 1 70 mg/dL      Glucose 116 mg/dL      Calcium 8 7 mg/dL      AST 30 U/L      ALT 21 U/L      Alkaline Phosphatase 132 U/L      Total Protein 7 6 g/dL      Albumin 3 1 g/dL      Total Bilirubin 0 70 mg/dL      eGFR 43 ml/min/1 73sq m     Narrative:       [facility] guidelines for Chronic Kidney Disease (CKD):     Stage 1 with normal or high GFR (GFR > 90 mL/min/1 73 square meters)    Stage 2 Mild CKD (GFR = 60-89 mL/min/1 73 square meters)    Stage 3A Moderate CKD (GFR = 45-59 mL/min/1 73 square meters)    Stage 3B Moderate CKD (GFR = 30-44 mL/min/1 73 square meters)    Stage 4 Severe CKD (GFR = 15-29 mL/min/1 73 square meters)    Stage 5 End Stage CKD (GFR <15 mL/min/1 73 square meters)  Note: GFR calculation is accurate only with a steady state creatinine    D-Dimer [[id number]]  (Abnormal) Collected:  08/21/20 1141    Lab Status:  Final result Specimen:  Blood from Arm, Right Updated:  08/21/20 1201     D-Dimer, Quant 1 21 ug/ml FEU     Protime-INR [[id number]]  (Abnormal) Collected:  08/21/20 1141    Lab Status:  Final result Specimen:  Blood from Arm, Right Updated:  08/21/20 1201     Protime 23 3 seconds      INR 2 12    APTT [[id number]]  (Abnormal) Collected:  08/21/20 1141    Lab Status:  Final result Specimen:  Blood from Arm, Right Updated:  08/21/20 1201     PTT 45 seconds     CBC and differential [[id number]]  (Abnormal) Collected:  08/21/20 1141    Lab Status:  Final result Specimen:  Blood from Arm, Right Updated:  08/21/20 1145     WBC 5 81 Thousand/uL      RBC 2 84 Million/uL      Hemoglobin 9 7 g/dL      Hematocrit 30 2 %       fL      MCH 34 2 pg      MCHC 32 1 g/dL      RDW 14 5 %      MPV 10 0 fL      Platelets 862 Thousands/uL      nRBC 0 /100 WBCs      Neutrophils Relative 73 %      Immat GRANS % 1 %      Lymphocytes Relative 14 %      Monocytes Relative 8 %      Eosinophils Relative 3 %      Basophils Relative 1 %      Neutrophils Absolute 4 23 Thousands/µL      Immature Grans Absolute 0 04 Thousand/uL      Lymphocytes Absolute 0 82 Thousands/µL      Monocytes Absolute 0 49 Thousand/µL      Eosinophils Absolute 0 17 Thousand/µL      Basophils Absolute 0 06 Thousands/µL                  PE Study with CT Abdomen and Pelvis with contrast    (Results Pending)              Procedures  ECG 12 Lead Documentation Only    Date/Time: 8/21/2020 11:55 AM  Performed by: Martinez Carbajal DO  Authorized by: Martinez Carbajal DO     Indications / Diagnosis:  Dyspnea   ECG reviewed by me, the ED Provider: yes    Patient location:  ED  Previous ECG:     Previous ECG:  Unavailable  Interpretation:     Interpretation: non-specific    Rate:     ECG rate:  79    ECG rate assessment: normal    Rhythm:     Rhythm: atrial fibrillation    ST segments:     ST segments:  Non-specific    CriticalCare Time  Performed by: Martinez Carbajal DO  Authorized by: Martinez Carbajal DO     Critical care provider statement:     Critical care time (minutes):  45    Critical care start time:  8/21/2020 12:52 PM    Critical care was necessary to treat or prevent imminent or life-threatening deterioration of the following conditions:  Renal failure and metabolic crisis    Critical care was time spent personally by me on the following activities:  Evaluation of patient's response to treatment, examination of patient, review of old charts and re-evaluation of patient's condition  Comments:      Treatment of hyperkalemia               ED Course       US AUDIT      Most Recent Value   Initial Alcohol Screen: US AUDIT-C    1  How often do you have a drink containing alcohol?   3 Filed at: 08/21/2020 1203   2  How many drinks containing alcohol do you have on a typical day you are drinking? 1 Filed at: 08/21/2020 1203   3a  Male UNDER 65: How often do you have five or more drinks on one occasion? 0 Filed at: 08/21/2020 1203   3b  FEMALE Any Age, or MALE 65+: How often do you have 4 or more drinks on one occassion? 0 Filed at: 08/21/2020 1203   Audit-C Score  4 Filed at: 08/21/2020 1203            HEART Risk Score      Most Recent Value   Heart Score Risk Calculator   History  1 Filed at: 08/21/2020 1130   ECG  1 Filed at: 08/21/2020 1130   Age  2 Filed at: 08/21/2020 1130   Risk Factors  2 Filed at: 08/21/2020 1130   Troponin  0 Filed at: 08/21/2020 1130   HEART Score  6 Filed at: 08/21/2020 1130            ELIDA/DAST-10      Most Recent Value   How many times in the past year have you    Used an illegal drug or used a prescription medication for non-medical reasons? Never Filed at: 08/21/2020 1202                                MDM  Number of Diagnoses or Management Options  Diagnosis management comments: Differential diagnosis 1  Pulmonary embolism 2  CHF 3  Bowel obstruction 4  Colitis  Will perform CT scan chest on pelvis check labs  Amount and/or Complexity of Data Reviewed  Clinical lab tests: ordered and reviewed  Tests in the radiology section of CPT®: ordered and reviewed  Tests in the medicine section of CPT®: reviewed and ordered    Risk of Complications, Morbidity, and/or Mortality  Presenting problems: high  Diagnostic procedures: high  Management options: high          Disposition  Final diagnoses:   None     ED Disposition     None      Follow-up Information    None         Patient's Medications   Discharge Prescriptions    No medications on file     No discharge procedures on file      PDMP Review       Value Time User    PDMP Reviewed  Yes 7/23/2020 11:34 AM Anais Dodge MD          ED Provider  Electronically Signed by           Marlin Sanchez DO  08/21/20 [id number] Zeenat Avery DO  08/21/20 6750

## 2020-08-21 NOTE — TELEPHONE ENCOUNTER
Called patient, he was on list to verify procedure for 8/28 with Dr Basil Galindo  Spoke with his wife, she stated he is currently at the ER due to his current medical complaints

## 2020-08-21 NOTE — SOCIAL WORK
Cm met with the patient to evaluate the patients prior function and living situation and any barriers to d/c and form a safe d/c plan  Cm also evaluated the patient for any services in the home or needs for services  Pt resides at home with his wife in a house  Has 4 EZEKIEL then 12 steps to his 2nd floor bedroom/bathroom  Pt is independent with his adls/ambulation  Re: DME has a SPC he uses  Pt drives  PCP is Hermila King and pharmacy is [facility] in [place]  Plans at this time are home with outpatient follow up  CM will follow and assist in dc planning

## 2020-08-21 NOTE — ASSESSMENT & PLAN NOTE
Wt Readings from Last 3 Encounters:   08/21/20 (!) 146 kg (320 lb 12 3 oz)   07/23/20 (!) 153 kg (338 lb)   07/13/20 (!) 158 kg (348 lb)       Acute on Chronic CHF  Most recent echo from 01/2020 - 60% EF  Takes 40 mg Lasix daily  Presenting creatinine is at baseline  The patient's weight actually appears below his recent baseline however he does have right pleural effusion  I/O    Daily weights  Will stop patient's 40mg Lasix and place on 40mg BID IV  Tele monitor until K normalizes

## 2020-08-21 NOTE — TELEPHONE ENCOUNTER
Patient called office with complaints of his stomach feeling very swollen  He is having trouble breathing  And is having diarrhea  He wants a phone call back at [phone]

## 2020-08-22 DIAGNOSIS — K20.90 ESOPHAGITIS: ICD-10-CM

## 2020-08-22 LAB
ALBUMIN SERPL BCP-MCNC: 2.7 G/DL (ref 3.5–5)
ALP SERPL-CCNC: 112 U/L (ref 46–116)
ALT SERPL W P-5'-P-CCNC: 19 U/L (ref 12–78)
ANION GAP SERPL CALCULATED.3IONS-SCNC: 7 MMOL/L (ref 4–13)
AST SERPL W P-5'-P-CCNC: 20 U/L (ref 5–45)
BASOPHILS # BLD AUTO: 0.03 THOUSANDS/ΜL (ref 0–0.1)
BASOPHILS NFR BLD AUTO: 1 % (ref 0–1)
BILIRUB SERPL-MCNC: 0.8 MG/DL (ref 0.2–1)
BUN SERPL-MCNC: 25 MG/DL (ref 5–25)
CALCIUM SERPL-MCNC: 8.5 MG/DL (ref 8.3–10.1)
CHLORIDE SERPL-SCNC: 102 MMOL/L (ref 100–108)
CO2 SERPL-SCNC: 26 MMOL/L (ref 21–32)
CREAT SERPL-MCNC: 1.65 MG/DL (ref 0.6–1.3)
EOSINOPHIL # BLD AUTO: 0.15 THOUSAND/ΜL (ref 0–0.61)
EOSINOPHIL NFR BLD AUTO: 3 % (ref 0–6)
ERYTHROCYTE [DISTWIDTH] IN BLOOD BY AUTOMATED COUNT: 14.5 % (ref 11.6–15.1)
GFR SERPL CREATININE-BSD FRML MDRD: 44 ML/MIN/1.73SQ M
GLUCOSE SERPL-MCNC: 109 MG/DL (ref 65–140)
GLUCOSE SERPL-MCNC: 110 MG/DL (ref 65–140)
GLUCOSE SERPL-MCNC: 131 MG/DL (ref 65–140)
GLUCOSE SERPL-MCNC: 135 MG/DL (ref 65–140)
GLUCOSE SERPL-MCNC: 148 MG/DL (ref 65–140)
HBV CORE AB SER QL: NORMAL
HBV CORE IGM SER QL: NORMAL
HBV SURFACE AG SER QL: NORMAL
HCT VFR BLD AUTO: 27.1 % (ref 36.5–49.3)
HCV AB SER QL: NORMAL
HGB BLD-MCNC: 8.7 G/DL (ref 12–17)
IMM GRANULOCYTES # BLD AUTO: 0.03 THOUSAND/UL (ref 0–0.2)
IMM GRANULOCYTES NFR BLD AUTO: 1 % (ref 0–2)
LYMPHOCYTES # BLD AUTO: 0.58 THOUSANDS/ΜL (ref 0.6–4.47)
LYMPHOCYTES NFR BLD AUTO: 11 % (ref 14–44)
MAGNESIUM SERPL-MCNC: 1.9 MG/DL (ref 1.6–2.6)
MCH RBC QN AUTO: 33.9 PG (ref 26.8–34.3)
MCHC RBC AUTO-ENTMCNC: 32.1 G/DL (ref 31.4–37.4)
MCV RBC AUTO: 105 FL (ref 82–98)
MONOCYTES # BLD AUTO: 0.4 THOUSAND/ΜL (ref 0.17–1.22)
MONOCYTES NFR BLD AUTO: 8 % (ref 4–12)
NEUTROPHILS # BLD AUTO: 4 THOUSANDS/ΜL (ref 1.85–7.62)
NEUTS SEG NFR BLD AUTO: 76 % (ref 43–75)
NRBC BLD AUTO-RTO: 0 /100 WBCS
PLATELET # BLD AUTO: 134 THOUSANDS/UL (ref 149–390)
PMV BLD AUTO: 10.7 FL (ref 8.9–12.7)
POTASSIUM SERPL-SCNC: 5 MMOL/L (ref 3.5–5.3)
PROT SERPL-MCNC: 6.7 G/DL (ref 6.4–8.2)
RBC # BLD AUTO: 2.57 MILLION/UL (ref 3.88–5.62)
SODIUM SERPL-SCNC: 135 MMOL/L (ref 136–145)
WBC # BLD AUTO: 5.19 THOUSAND/UL (ref 4.31–10.16)

## 2020-08-22 PROCEDURE — 83735 ASSAY OF MAGNESIUM: CPT | Performed by: PHYSICIAN ASSISTANT

## 2020-08-22 PROCEDURE — 85025 COMPLETE CBC W/AUTO DIFF WBC: CPT | Performed by: PHYSICIAN ASSISTANT

## 2020-08-22 PROCEDURE — 99232 SBSQ HOSP IP/OBS MODERATE 35: CPT | Performed by: FAMILY MEDICINE

## 2020-08-22 PROCEDURE — 80053 COMPREHEN METABOLIC PANEL: CPT | Performed by: PHYSICIAN ASSISTANT

## 2020-08-22 PROCEDURE — 82948 REAGENT STRIP/BLOOD GLUCOSE: CPT

## 2020-08-22 RX ORDER — ACETAMINOPHEN 325 MG/1
650 TABLET ORAL EVERY 6 HOURS PRN
Status: DISCONTINUED | OUTPATIENT
Start: 2020-08-22 | End: 2020-08-27 | Stop reason: HOSPADM

## 2020-08-22 RX ADMIN — DOCUSATE SODIUM 100 MG: 100 CAPSULE, LIQUID FILLED ORAL at 08:04

## 2020-08-22 RX ADMIN — FUROSEMIDE 40 MG: 10 INJECTION, SOLUTION INTRAMUSCULAR; INTRAVENOUS at 08:04

## 2020-08-22 RX ADMIN — PANTOPRAZOLE SODIUM 40 MG: 40 TABLET, DELAYED RELEASE ORAL at 05:48

## 2020-08-22 RX ADMIN — LISINOPRIL 10 MG: 10 TABLET ORAL at 08:04

## 2020-08-22 RX ADMIN — CARVEDILOL 6.25 MG: 3.12 TABLET, FILM COATED ORAL at 17:32

## 2020-08-22 RX ADMIN — CARVEDILOL 6.25 MG: 3.12 TABLET, FILM COATED ORAL at 08:04

## 2020-08-22 RX ADMIN — FUROSEMIDE 40 MG: 10 INJECTION, SOLUTION INTRAMUSCULAR; INTRAVENOUS at 17:33

## 2020-08-22 RX ADMIN — DOCUSATE SODIUM 100 MG: 100 CAPSULE, LIQUID FILLED ORAL at 17:33

## 2020-08-22 RX ADMIN — DIGOXIN 250 MCG: 250 TABLET ORAL at 08:04

## 2020-08-22 NOTE — ASSESSMENT & PLAN NOTE
Wt Readings from Last 3 Encounters:   08/22/20 (!) 158 kg (348 lb 8 8 oz)   07/23/20 (!) 153 kg (338 lb)   07/13/20 (!) 158 kg (348 lb)       Acute on Chronic CHF  Most recent echo from 01/2020 - 60% EF  Takes 40 mg Lasix daily  Presenting creatinine is at baseline  The patient's weight actually appears below his recent baseline however he does have right pleural effusion  Weight and output appears to be recorded inaccurately - ensure strict I&Os and standing daily weights  Continue 40mg BID IV  BMP in a m

## 2020-08-22 NOTE — ASSESSMENT & PLAN NOTE
CTA PE study showed cirrhotic changes to liver  Trace ascites  No known history of cirrhosis  Check hepatitis panel    Would recommend outpatient workup by GI

## 2020-08-22 NOTE — PROGRESS NOTES
Progress Note - Natividad Estrada 1960, 63 y o  male MRN: [id number]    Unit/Bed#: 406-01 Encounter: [id number]    Primary Care Provider: Tierra Hudson DO   Date and time admitted to hospital: 8/21/2020 11:30 AM        * Acute on chronic diastolic congestive heart failure [facility])  Assessment & Plan  Wt Readings from Last 3 Encounters:   08/22/20 (!) 158 kg (348 lb 8 8 oz)   07/23/20 (!) 153 kg (338 lb)   07/13/20 (!) 158 kg (348 lb)       Acute on Chronic CHF  Most recent echo from 01/2020 - 60% EF  Takes 40 mg Lasix daily  Presenting creatinine is at baseline  The patient's weight actually appears below his recent baseline however he does have right pleural effusion  Weight and output appears to be recorded inaccurately - ensure strict I&Os and standing daily weights  Continue 40mg BID IV  BMP in a m  Chronic anemia  Assessment & Plan  Baseline hemoglobin around 10    Tobacco abuse  Assessment & Plan  Reports smoking about 5cig/day  Declining NRT    Hyperkalemia  Assessment & Plan  Presenting potassium 5 7  In the ED, received calcium gluconate, Kayexalate 15 g, insulin  Resolved with potassium at 5 0  Put lisinopril on hold  Patient on IV Lasix and expect potassium to continue to trend down    CKD (chronic kidney disease) stage 3, GFR 30-59 ml/min [facility])  Assessment & Plan  Patient likely has a diagnosis of CKD given his GFR values on record  His presenting creatinine appears near baseline  Monitor I/O  Check bladder scan if output is diminished or creatinine increases  Cirrhosis of liver with ascites [facility])  Assessment & Plan  CTA PE study showed cirrhotic changes to liver  Trace ascites  No known history of cirrhosis  Check hepatitis panel  Would recommend outpatient workup by GI    Pleural effusion on right  Assessment & Plan  CTA PE study reveals moderate right pleural effusion    Proceed with medical treatment with IV Lasix, does not appear to have need for thoracentesis at this time    STEFF (obstructive sleep apnea)  Assessment & Plan  The patient is being followed as an outpatient  Awaiting CPAP fitting  Class 3 severe obesity due to excess calories with serious comorbidity and body mass index (BMI) of 45 0 to 49 9 in adult [facility])  Assessment & Plan  Would benefit from weight loss  Atrial fibrillation (HCC)  Assessment & Plan  On Coumadin and Coreg 6 25 b i d  INR goal 2-3, continue to monitor  Coumadin dose 2 5 mg daily, except 5 mg on Monday and Thursday  VTE Pharmacologic Prophylaxis:   Pharmacologic: Warfarin (Coumadin)  Mechanical VTE Prophylaxis in Place: Yes    Patient Centered Rounds: I have performed bedside rounds with nursing staff today  Discussions with Specialists or Other Care Team Provider:     Education and Discussions with Family / Patient: Patient    Time Spent for Care: 30 minutes  More than 50% of total time spent on counseling and coordination of care as described above  Current Length of Stay: 1 day(s)    Current Patient Status: Inpatient   Certification Statement: The patient will continue to require additional inpatient hospital stay due to IV Lasix, close monitoring     Discharge Plan: TBD    Code Status: Level 1 - Full Code      Subjective:   Patient seen and examined  Reports feeling improved with respiratory status and slight improvement in his leg edema  No o/n events  Objective:     Vitals:   Temp (24hrs), Av 4 °F (36 9 °C), Min:98 2 °F (36 8 °C), Max:98 8 °F (37 1 °C)    Temp:  [98 2 °F (36 8 °C)-98 8 °F (37 1 °C)] 98 2 °F (36 8 °C)  HR:  [61-77] 61  Resp:  [18-20] 18  BP: (112-132)/(52-81) 132/81  SpO2:  [94 %-98 %] 98 %  Body mass index is 48 61 kg/m²  Input and Output Summary (last 24 hours):        Intake/Output Summary (Last 24 hours) at 2020 1505  Last data filed at 2020 0830  Gross per 24 hour   Intake 480 ml   Output    Net 480 ml       Physical Exam:     Physical Exam  Constitutional:       Appearance: He is obese    HENT:      Nose: No congestion  Mouth/Throat:      Pharynx: No oropharyngeal exudate  Eyes:      Conjunctiva/sclera: Conjunctivae normal    Cardiovascular:      Rate and Rhythm: Normal rate and regular rhythm  Heart sounds: No murmur  Pulmonary:      Effort: No respiratory distress  Breath sounds: No wheezing  Comments: Decreased at RLL  Abdominal:      Comments: Obese    Musculoskeletal:      Left lower leg: Edema (b/l) present  Neurological:      General: No focal deficit present  Psychiatric:         Mood and Affect: Mood normal          Additional Data:     Labs:    Results from last 7 days   Lab Units 08/22/20  0551   WBC Thousand/uL 5 19   HEMOGLOBIN g/dL 8 7*   HEMATOCRIT % 27 1*   PLATELETS Thousands/uL 134*   NEUTROS PCT % 76*   LYMPHS PCT % 11*   MONOS PCT % 8   EOS PCT % 3     Results from last 7 days   Lab Units 08/22/20  0551   SODIUM mmol/L 135*   POTASSIUM mmol/L 5 0   CHLORIDE mmol/L 102   CO2 mmol/L 26   BUN mg/dL 25   CREATININE mg/dL 1 65*   ANION GAP mmol/L 7   CALCIUM mg/dL 8 5   ALBUMIN g/dL 2 7*   TOTAL BILIRUBIN mg/dL 0 80   ALK PHOS U/L 112   ALT U/L 19   AST U/L 20   GLUCOSE RANDOM mg/dL 109     Results from last 7 days   Lab Units 08/21/20  1141   INR  2 12*     Results from last 7 days   Lab Units 08/22/20  1115 08/22/20  0709 08/21/20  2117 08/21/20  1627 08/21/20  1337   POC GLUCOSE mg/dl 135 110 117 128 138         Results from last 7 days   Lab Units 08/21/20  1633   PROCALCITONIN ng/ml 0 17           * I Have Reviewed All Lab Data Listed Above  * Additional Pertinent Lab Tests Reviewed:  Zac [facility] Admission Reviewed    Imaging:    Imaging Reports Reviewed Today Include: no new       Recent Cultures (last 7 days):           Last 24 Hours Medication List:   Current Facility-Administered Medications   Medication Dose Route Frequency Provider Last Rate    acetaminophen  650 mg Oral Q6H PRN Celeste Rm MD      carvedilol  6 25 mg Oral BID With Meals Lesly Lovell PA-C      digoxin  250 mcg Oral Daily Lesly Lovell PA-C      docusate sodium  100 mg Oral BID Lesly Lovell PA-C      furosemide  40 mg Intravenous BID (diuretic) Lesly Lovell PA-C      insulin lispro  1-5 Units Subcutaneous TID AC Lesly Lovell PA-C      insulin lispro  1-5 Units Subcutaneous HS Lesly Lovell PA-C      ondansetron  4 mg Intravenous Q6H PRN Lesly Lovell PA-C      pantoprazole  40 mg Oral Early Morning Lesly Lovell PA-C          Today, Patient Was Seen By: Dasha Levi MD    ** Please Note: Dictation voice to text software may have been used in the creation of this document   **

## 2020-08-22 NOTE — ASSESSMENT & PLAN NOTE
CTA PE study reveals moderate right pleural effusion    Proceed with medical treatment with IV Lasix, does not appear to have need for thoracentesis at this time

## 2020-08-22 NOTE — PLAN OF CARE
Problem: Potential for Falls  Goal: Patient will remain free of falls  Description: INTERVENTIONS:  - Assess patient frequently for physical needs  -  Identify cognitive and physical deficits and behaviors that affect risk of falls  -  [place] fall precautions as indicated by assessment   - Educate patient/family on patient safety including physical limitations  - Instruct patient to call for assistance with activity based on assessment  - Modify environment to reduce risk of injury  - Consider OT/PT consult to assist with strengthening/mobility  Outcome: Progressing     Problem: PAIN - ADULT  Goal: Verbalizes/displays adequate comfort level or baseline comfort level  Description: Interventions:  - Encourage patient to monitor pain and request assistance  - Assess pain using appropriate pain scale  - Administer analgesics based on type and severity of pain and evaluate response  - Implement non-pharmacological measures as appropriate and evaluate response  - Consider cultural and social influences on pain and pain management  - Notify physician/advanced practitioner if interventions unsuccessful or patient reports new pain  Outcome: Progressing     Problem: SAFETY ADULT  Goal: Patient will remain free of falls  Description: INTERVENTIONS:  - Assess patient frequently for physical needs  -  Identify cognitive and physical deficits and behaviors that affect risk of falls    -  [place] fall precautions as indicated by assessment   - Educate patient/family on patient safety including physical limitations  - Instruct patient to call for assistance with activity based on assessment  - Modify environment to reduce risk of injury  - Consider OT/PT consult to assist with strengthening/mobility  Outcome: Progressing  Goal: Maintain or return to baseline ADL function  Description: INTERVENTIONS:  -  Assess patient's ability to carry out ADLs; assess patient's baseline for ADL function and identify physical deficits which impact ability to perform ADLs (bathing, care of mouth/teeth, toileting, grooming, dressing, etc )  - Assess/evaluate cause of self-care deficits   - Assess range of motion  - Assess patient's mobility; develop plan if impaired  - Assess patient's need for assistive devices and provide as appropriate  - Encourage maximum independence but intervene and supervise when necessary  - Involve family in performance of ADLs  - Assess for home care needs following discharge   - Consider OT consult to assist with ADL evaluation and planning for discharge  - Provide patient education as appropriate  Outcome: Progressing  Goal: Maintain or return mobility status to optimal level  Description: INTERVENTIONS:  - Assess patient's baseline mobility status (ambulation, transfers, stairs, etc )    - Identify cognitive and physical deficits and behaviors that affect mobility  - Identify mobility aids required to assist with transfers and/or ambulation (gait belt, sit-to-stand, lift, walker, cane, etc )  - [place] fall precautions as indicated by assessment  - Record patient progress and toleration of activity level on Mobility SBAR; progress patient to next Phase/Stage  - Instruct patient to call for assistance with activity based on assessment  - Consider rehabilitation consult to assist with strengthening/weightbearing, etc   Outcome: Progressing     Problem: DISCHARGE PLANNING  Goal: Discharge to home or other facility with appropriate resources  Description: INTERVENTIONS:  - Identify barriers to discharge w/patient and caregiver  - Arrange for needed discharge resources and transportation as appropriate  - Identify discharge learning needs (meds, wound care, etc )  - Arrange for interpretive services to assist at discharge as needed  - Refer to Case Management Department for coordinating discharge planning if the patient needs post-hospital services based on physician/advanced practitioner order or complex needs related to functional status, cognitive ability, or social support system  Outcome: Progressing     Problem: Knowledge Deficit  Goal: Patient/family/caregiver demonstrates understanding of disease process, treatment plan, medications, and discharge instructions  Description: Complete learning assessment and assess knowledge base    Interventions:  - Provide teaching at level of understanding  - Provide teaching via preferred learning methods  Outcome: Progressing

## 2020-08-22 NOTE — ASSESSMENT & PLAN NOTE
Presenting potassium 5 7  In the ED, received calcium gluconate, Kayexalate 15 g, insulin    Resolved with potassium at 5 0  Put lisinopril on hold  Patient on IV Lasix and expect potassium to continue to trend down

## 2020-08-22 NOTE — ASSESSMENT & PLAN NOTE
On Coumadin and Coreg 6 25 b i d  INR goal 2-3, continue to monitor  Coumadin dose 2 5 mg daily, except 5 mg on Monday and Thursday

## 2020-08-22 NOTE — ASSESSMENT & PLAN NOTE
Patient likely has a diagnosis of CKD given his GFR values on record  His presenting creatinine appears near baseline  Monitor I/O  Check bladder scan if output is diminished or creatinine increases

## 2020-08-23 LAB
ANION GAP SERPL CALCULATED.3IONS-SCNC: 8 MMOL/L (ref 4–13)
BASOPHILS # BLD AUTO: 0.04 THOUSANDS/ΜL (ref 0–0.1)
BASOPHILS NFR BLD AUTO: 1 % (ref 0–1)
BUN SERPL-MCNC: 24 MG/DL (ref 5–25)
CALCIUM SERPL-MCNC: 8.6 MG/DL (ref 8.3–10.1)
CHLORIDE SERPL-SCNC: 103 MMOL/L (ref 100–108)
CO2 SERPL-SCNC: 26 MMOL/L (ref 21–32)
CREAT SERPL-MCNC: 1.69 MG/DL (ref 0.6–1.3)
EOSINOPHIL # BLD AUTO: 0.15 THOUSAND/ΜL (ref 0–0.61)
EOSINOPHIL NFR BLD AUTO: 3 % (ref 0–6)
ERYTHROCYTE [DISTWIDTH] IN BLOOD BY AUTOMATED COUNT: 14.6 % (ref 11.6–15.1)
GFR SERPL CREATININE-BSD FRML MDRD: 43 ML/MIN/1.73SQ M
GLUCOSE SERPL-MCNC: 117 MG/DL (ref 65–140)
GLUCOSE SERPL-MCNC: 126 MG/DL (ref 65–140)
GLUCOSE SERPL-MCNC: 127 MG/DL (ref 65–140)
GLUCOSE SERPL-MCNC: 128 MG/DL (ref 65–140)
GLUCOSE SERPL-MCNC: 130 MG/DL (ref 65–140)
HCT VFR BLD AUTO: 30.2 % (ref 36.5–49.3)
HGB BLD-MCNC: 9.5 G/DL (ref 12–17)
IMM GRANULOCYTES # BLD AUTO: 0.03 THOUSAND/UL (ref 0–0.2)
IMM GRANULOCYTES NFR BLD AUTO: 1 % (ref 0–2)
INR PPP: 1.83 (ref 0.84–1.19)
LYMPHOCYTES # BLD AUTO: 0.78 THOUSANDS/ΜL (ref 0.6–4.47)
LYMPHOCYTES NFR BLD AUTO: 16 % (ref 14–44)
MCH RBC QN AUTO: 33.5 PG (ref 26.8–34.3)
MCHC RBC AUTO-ENTMCNC: 31.5 G/DL (ref 31.4–37.4)
MCV RBC AUTO: 106 FL (ref 82–98)
MONOCYTES # BLD AUTO: 0.44 THOUSAND/ΜL (ref 0.17–1.22)
MONOCYTES NFR BLD AUTO: 9 % (ref 4–12)
NEUTROPHILS # BLD AUTO: 3.57 THOUSANDS/ΜL (ref 1.85–7.62)
NEUTS SEG NFR BLD AUTO: 70 % (ref 43–75)
NRBC BLD AUTO-RTO: 0 /100 WBCS
PLATELET # BLD AUTO: 147 THOUSANDS/UL (ref 149–390)
PMV BLD AUTO: 11 FL (ref 8.9–12.7)
POTASSIUM SERPL-SCNC: 4.8 MMOL/L (ref 3.5–5.3)
PROTHROMBIN TIME: 20.8 SECONDS (ref 11.6–14.5)
RBC # BLD AUTO: 2.84 MILLION/UL (ref 3.88–5.62)
SODIUM SERPL-SCNC: 137 MMOL/L (ref 136–145)
WBC # BLD AUTO: 5.01 THOUSAND/UL (ref 4.31–10.16)

## 2020-08-23 PROCEDURE — 99232 SBSQ HOSP IP/OBS MODERATE 35: CPT | Performed by: FAMILY MEDICINE

## 2020-08-23 PROCEDURE — 85025 COMPLETE CBC W/AUTO DIFF WBC: CPT | Performed by: FAMILY MEDICINE

## 2020-08-23 PROCEDURE — 85610 PROTHROMBIN TIME: CPT | Performed by: FAMILY MEDICINE

## 2020-08-23 PROCEDURE — 82948 REAGENT STRIP/BLOOD GLUCOSE: CPT

## 2020-08-23 PROCEDURE — 80048 BASIC METABOLIC PNL TOTAL CA: CPT | Performed by: FAMILY MEDICINE

## 2020-08-23 RX ORDER — WARFARIN SODIUM 5 MG/1
5 TABLET ORAL
Status: DISCONTINUED | OUTPATIENT
Start: 2020-08-23 | End: 2020-08-24

## 2020-08-23 RX ORDER — FUROSEMIDE 10 MG/ML
40 INJECTION INTRAMUSCULAR; INTRAVENOUS
Status: DISCONTINUED | OUTPATIENT
Start: 2020-08-23 | End: 2020-08-27 | Stop reason: HOSPADM

## 2020-08-23 RX ADMIN — PANTOPRAZOLE SODIUM 40 MG: 40 TABLET, DELAYED RELEASE ORAL at 05:00

## 2020-08-23 RX ADMIN — FUROSEMIDE 40 MG: 10 INJECTION, SOLUTION INTRAMUSCULAR; INTRAVENOUS at 19:53

## 2020-08-23 RX ADMIN — WARFARIN SODIUM 5 MG: 5 TABLET ORAL at 17:50

## 2020-08-23 RX ADMIN — DIGOXIN 250 MCG: 250 TABLET ORAL at 08:59

## 2020-08-23 RX ADMIN — FUROSEMIDE 40 MG: 10 INJECTION, SOLUTION INTRAMUSCULAR; INTRAVENOUS at 12:41

## 2020-08-23 RX ADMIN — DOCUSATE SODIUM 100 MG: 100 CAPSULE, LIQUID FILLED ORAL at 08:59

## 2020-08-23 RX ADMIN — FUROSEMIDE 40 MG: 10 INJECTION, SOLUTION INTRAMUSCULAR; INTRAVENOUS at 08:59

## 2020-08-23 RX ADMIN — CARVEDILOL 6.25 MG: 3.12 TABLET, FILM COATED ORAL at 17:50

## 2020-08-23 RX ADMIN — CARVEDILOL 6.25 MG: 3.12 TABLET, FILM COATED ORAL at 08:58

## 2020-08-23 NOTE — ASSESSMENT & PLAN NOTE
CTA PE study reveals moderate right pleural effusion    Proceed with medical treatment with IV Lasix, may need to repeat Xray for improvement  May need thoracentesis if refractory to medical treatment and possibly for diagnosis - especially with the presence cirrhosis on imaging with no prior known history of liver disease

## 2020-08-23 NOTE — ASSESSMENT & PLAN NOTE
Wt Readings from Last 3 Encounters:   08/23/20 (!) 157 kg (347 lb 3 6 oz)   07/23/20 (!) 153 kg (338 lb)   07/13/20 (!) 158 kg (348 lb)     Acute on Chronic CHF  Most recent echo from 01/2020 - 60% EF  Takes 40 mg Lasix daily  Presenting creatinine is at baseline  The patient's weight actually appears below his recent baseline however he does have right pleural effusion  Weight and output appears to be recorded inaccurately - ensure strict I&Os and standing daily weights  Cr stable - increase 40mg to TID IV  BMP in a m

## 2020-08-23 NOTE — ASSESSMENT & PLAN NOTE
Lab Results   Component Value Date    HGBA1C 5 5 03/13/2020       Recent Labs     08/22/20  1545 08/22/20  2102 08/23/20  0725 08/23/20  1127   POCGLU 131 148* 130 128       Blood Sugar Average: Last 72 hrs:  (P) 394 [id number]   Noted controlled hemoglobin A1c  Hold patient's metformin for now  ISS

## 2020-08-23 NOTE — ASSESSMENT & PLAN NOTE
CTA PE study showed cirrhotic changes to liver  Trace ascites  No known history of cirrhosis    Likely due to nonalcoholic fatty liver disease  Hepatitis panel reviewed, nonreactive  Monitor CMP - INR elevated due to patient being on [facility] with warfarin  Needs outpatient workup by GI

## 2020-08-23 NOTE — PROGRESS NOTES
Progress Note - Clint Miles 1960, 63 y o  male MRN: [id number]    Unit/Bed#: 406-01 Encounter: [id number]    Primary Care Provider: Dick Valenzuela DO   Date and time admitted to hospital: 8/21/2020 11:30 AM        * Acute on chronic diastolic congestive heart failure [facility])  Assessment & Plan  Wt Readings from Last 3 Encounters:   08/23/20 (!) 157 kg (347 lb 3 6 oz)   07/23/20 (!) 153 kg (338 lb)   07/13/20 (!) 158 kg (348 lb)     Acute on Chronic CHF  Most recent echo from 01/2020 - 60% EF  Takes 40 mg Lasix daily  Presenting creatinine is at baseline  The patient's weight actually appears below his recent baseline however he does have right pleural effusion  Weight and output appears to be recorded inaccurately - ensure strict I&Os and standing daily weights  Cr stable - increase 40mg to TID IV  BMP in a m  Hyperkalemia  Assessment & Plan  Presenting potassium 5 7  In the ED, received calcium gluconate, Kayexalate 15 g, insulin  Resolved with potassium at 4 8  Continue to hold lisinopril for now while being aggressively diuresed  Patient on IV Lasix and expect potassium to continue to trend down - monitor BMP levels    CKD (chronic kidney disease) stage 3, GFR 30-59 ml/min [facility])  Assessment & Plan  Patient likely has a diagnosis of CKD given his GFR values on record  His presenting creatinine appears near baseline  Monitor I/O  Check bladder scan if output is diminished or creatinine increases  Cirrhosis of liver with ascites [facility])  Assessment & Plan  CTA PE study showed cirrhotic changes to liver  Trace ascites  No known history of cirrhosis  Likely due to nonalcoholic fatty liver disease  Hepatitis panel reviewed, nonreactive  Monitor CMP - INR elevated due to patient being on [facility] with warfarin  Needs outpatient workup by GI    Pleural effusion on right  Assessment & Plan  CTA PE study reveals moderate right pleural effusion    Proceed with medical treatment with IV Lasix, may need to repeat Xray for improvement  May need thoracentesis if refractory to medical treatment and possibly for diagnosis - especially with the presence cirrhosis on imaging with no prior known history of liver disease    STEFF (obstructive sleep apnea)  Assessment & Plan  The patient is being followed as an outpatient  Awaiting CPAP fitting  Diabetes mellitus [facility])  Assessment & Plan  Lab Results   Component Value Date    HGBA1C 5 5 2020       Recent Labs     20  1545 20  2102 20  0725 20  1127   POCGLU 131 148* 130 128       Blood Sugar Average: Last 72 hrs:  (P) 613 [id number]   Noted controlled hemoglobin A1c  Hold patient's metformin for now  ISS  Atrial fibrillation (HCC)  Assessment & Plan  On Coumadin and Coreg 6 25 b i d  INR goal 2-3, continue to monitor  Coumadin dose 2 5 mg daily, except 5 mg on Monday and Thursday  VTE Pharmacologic Prophylaxis:   Pharmacologic: Warfarin (Coumadin)  Mechanical VTE Prophylaxis in Place: Yes    Patient Centered Rounds: I have performed bedside rounds with nursing staff today  Discussions with Specialists or Other Care Team Provider: CM    Education and Discussions with Family / Patient: Patient     Time Spent for Care: 30 minutes  More than 50% of total time spent on counseling and coordination of care as described above  Current Length of Stay: 2 day(s)    Current Patient Status: Inpatient   Certification Statement: The patient will continue to require additional inpatient hospital stay due to IV Lasix     Discharge Plan: TBD    Code Status: Level 1 - Full Code      Subjective:   Patient seen and examined  He reports feeling slightly better with his breathing, decreased abdominal distension and decreased lower extremity edema  Reports increased urination       Objective:     Vitals:   Temp (24hrs), Av 3 °F (36 8 °C), Min:98 2 °F (36 8 °C), Max:98 4 °F (36 9 °C)    Temp:  [98 2 °F (36 8 °C)-98 4 °F (36 9 °C)] 98 4 °F (36 9 °C)  HR:  [61-66] 66  Resp:  [18-21] 18  BP: (105-132)/(57-81) 123/73  SpO2:  [96 %-98 %] 97 %  Body mass index is 48 43 kg/m²  Input and Output Summary (last 24 hours): Intake/Output Summary (Last 24 hours) at 8/23/2020 1227  Last data filed at 8/23/2020 1032  Gross per 24 hour   Intake 180 ml   Output 900 ml   Net -720 ml       Physical Exam:     Physical Exam  Constitutional:       General: He is not in acute distress  Appearance: He is not ill-appearing  HENT:      Nose: No congestion  Eyes:      Conjunctiva/sclera: Conjunctivae normal    Cardiovascular:      Rate and Rhythm: Normal rate and regular rhythm  Heart sounds: No murmur  Pulmonary:      Effort: No respiratory distress  Breath sounds: No wheezing or rales  Comments: RLL dullness   Abdominal:      General: There is distension  Tenderness: There is no abdominal tenderness  There is no guarding  Musculoskeletal:      Left lower leg: Edema present  Skin:     Comments: Thickened LE/chronic edema changes    Neurological:      Mental Status: He is oriented to person, place, and time     Psychiatric:         Mood and Affect: Mood normal          Additional Data:     Labs:    Results from last 7 days   Lab Units 08/23/20  0455   WBC Thousand/uL 5 01   HEMOGLOBIN g/dL 9 5*   HEMATOCRIT % 30 2*   PLATELETS Thousands/uL 147*   NEUTROS PCT % 70   LYMPHS PCT % 16   MONOS PCT % 9   EOS PCT % 3     Results from last 7 days   Lab Units 08/23/20  0455 08/22/20  0551   SODIUM mmol/L 137 135*   POTASSIUM mmol/L 4 8 5 0   CHLORIDE mmol/L 103 102   CO2 mmol/L 26 26   BUN mg/dL 24 25   CREATININE mg/dL 1 69* 1 65*   ANION GAP mmol/L 8 7   CALCIUM mg/dL 8 6 8 5   ALBUMIN g/dL  --  2 7*   TOTAL BILIRUBIN mg/dL  --  0 80   ALK PHOS U/L  --  112   ALT U/L  --  19   AST U/L  --  20   GLUCOSE RANDOM mg/dL 117 109     Results from last 7 days   Lab Units 08/23/20  0455   INR  1 83*     Results from last 7 days   Lab Units 08/23/20  1127 08/23/20  0725 08/22/20  2102 08/22/20  1545 08/22/20  1115 08/22/20  0709 08/21/20  2117 08/21/20  1627 08/21/20  1337   POC GLUCOSE mg/dl 128 130 148* 131 135 110 117 128 138         Results from last 7 days   Lab Units 08/21/20  1633   PROCALCITONIN ng/ml 0 17           * I Have Reviewed All Lab Data Listed Above  * Additional Pertinent Lab Tests Reviewed: Zac 65 Admission Reviewed    Imaging:    Imaging Reports Reviewed Today Include: no new     Recent Cultures (last 7 days):           Last 24 Hours Medication List:   Current Facility-Administered Medications   Medication Dose Route Frequency Provider Last Rate    acetaminophen  650 mg Oral Q6H PRN Glen Dubin, MD      carvedilol  6 25 mg Oral BID With Meals Lesly Lovell PA-C      digoxin  250 mcg Oral Daily Lesly Lovell PA-C      docusate sodium  100 mg Oral BID Lesly Lovell PA-C      furosemide  40 mg Intravenous TID (diuretic) Glen Dubin, MD      insulin lispro  1-5 Units Subcutaneous TID AC Lesly Lovell PA-C      insulin lispro  1-5 Units Subcutaneous HS Lesly Lovell PA-C      ondansetron  4 mg Intravenous Q6H PRN Lesly Lovell PA-C      pantoprazole  40 mg Oral Early Morning Lesly Lovell PA-C      warfarin  5 mg Oral Daily (warfarin) Glen Dubin, MD          Today, Patient Was Seen By: Lisa Hahn MD    ** Please Note: Dictation voice to text software may have been used in the creation of this document   **

## 2020-08-23 NOTE — ASSESSMENT & PLAN NOTE
Presenting potassium 5 7  In the ED, received calcium gluconate, Kayexalate 15 g, insulin    Resolved with potassium at 4 8  Continue to hold lisinopril for now while being aggressively diuresed  Patient on IV Lasix and expect potassium to continue to trend down - monitor BMP levels

## 2020-08-23 NOTE — PLAN OF CARE
Problem: Potential for Falls  Goal: Patient will remain free of falls  Description: INTERVENTIONS:  - Assess patient frequently for physical needs  -  Identify cognitive and physical deficits and behaviors that affect risk of falls  -  [place] fall precautions as indicated by assessment   - Educate patient/family on patient safety including physical limitations  - Instruct patient to call for assistance with activity based on assessment  - Modify environment to reduce risk of injury  - Consider OT/PT consult to assist with strengthening/mobility  Outcome: Progressing     Problem: PAIN - ADULT  Goal: Verbalizes/displays adequate comfort level or baseline comfort level  Description: Interventions:  - Encourage patient to monitor pain and request assistance  - Assess pain using appropriate pain scale  - Administer analgesics based on type and severity of pain and evaluate response  - Implement non-pharmacological measures as appropriate and evaluate response  - Consider cultural and social influences on pain and pain management  - Notify physician/advanced practitioner if interventions unsuccessful or patient reports new pain  Outcome: Progressing     Problem: SAFETY ADULT  Goal: Patient will remain free of falls  Description: INTERVENTIONS:  - Assess patient frequently for physical needs  -  Identify cognitive and physical deficits and behaviors that affect risk of falls    -  [place] fall precautions as indicated by assessment   - Educate patient/family on patient safety including physical limitations  - Instruct patient to call for assistance with activity based on assessment  - Modify environment to reduce risk of injury  - Consider OT/PT consult to assist with strengthening/mobility  Outcome: Progressing  Goal: Maintain or return to baseline ADL function  Description: INTERVENTIONS:  -  Assess patient's ability to carry out ADLs; assess patient's baseline for ADL function and identify physical deficits which impact ability to perform ADLs (bathing, care of mouth/teeth, toileting, grooming, dressing, etc )  - Assess/evaluate cause of self-care deficits   - Assess range of motion  - Assess patient's mobility; develop plan if impaired  - Assess patient's need for assistive devices and provide as appropriate  - Encourage maximum independence but intervene and supervise when necessary  - Involve family in performance of ADLs  - Assess for home care needs following discharge   - Consider OT consult to assist with ADL evaluation and planning for discharge  - Provide patient education as appropriate  Outcome: Progressing  Goal: Maintain or return mobility status to optimal level  Description: INTERVENTIONS:  - Assess patient's baseline mobility status (ambulation, transfers, stairs, etc )    - Identify cognitive and physical deficits and behaviors that affect mobility  - Identify mobility aids required to assist with transfers and/or ambulation (gait belt, sit-to-stand, lift, walker, cane, etc )  - [place] fall precautions as indicated by assessment  - Record patient progress and toleration of activity level on Mobility SBAR; progress patient to next Phase/Stage  - Instruct patient to call for assistance with activity based on assessment  - Consider rehabilitation consult to assist with strengthening/weightbearing, etc   Outcome: Progressing     Problem: DISCHARGE PLANNING  Goal: Discharge to home or other facility with appropriate resources  Description: INTERVENTIONS:  - Identify barriers to discharge w/patient and caregiver  - Arrange for needed discharge resources and transportation as appropriate  - Identify discharge learning needs (meds, wound care, etc )  - Arrange for interpretive services to assist at discharge as needed  - Refer to Case Management Department for coordinating discharge planning if the patient needs post-hospital services based on physician/advanced practitioner order or complex needs related to functional status, cognitive ability, or social support system  Outcome: Progressing     Problem: Knowledge Deficit  Goal: Patient/family/caregiver demonstrates understanding of disease process, treatment plan, medications, and discharge instructions  Description: Complete learning assessment and assess knowledge base    Interventions:  - Provide teaching at level of understanding  - Provide teaching via preferred learning methods  Outcome: Progressing

## 2020-08-24 ENCOUNTER — APPOINTMENT (INPATIENT)
Dept: RADIOLOGY | Facility: HOSPITAL | Age: 60
DRG: 291 | End: 2020-08-24
Payer: MEDICARE

## 2020-08-24 PROBLEM — D69.6 THROMBOCYTOPENIA (HCC): Status: ACTIVE | Noted: 2020-08-24

## 2020-08-24 LAB
ALBUMIN SERPL BCP-MCNC: 2.8 G/DL (ref 3.5–5)
ALP SERPL-CCNC: 121 U/L (ref 46–116)
ALT SERPL W P-5'-P-CCNC: 18 U/L (ref 12–78)
ANION GAP SERPL CALCULATED.3IONS-SCNC: 5 MMOL/L (ref 4–13)
AST SERPL W P-5'-P-CCNC: 24 U/L (ref 5–45)
ATRIAL RATE: 78 BPM
BASOPHILS # BLD AUTO: 0.03 THOUSANDS/ΜL (ref 0–0.1)
BASOPHILS NFR BLD AUTO: 1 % (ref 0–1)
BILIRUB SERPL-MCNC: 0.8 MG/DL (ref 0.2–1)
BUN SERPL-MCNC: 24 MG/DL (ref 5–25)
CALCIUM SERPL-MCNC: 8.6 MG/DL (ref 8.3–10.1)
CHLORIDE SERPL-SCNC: 103 MMOL/L (ref 100–108)
CO2 SERPL-SCNC: 29 MMOL/L (ref 21–32)
CREAT SERPL-MCNC: 1.73 MG/DL (ref 0.6–1.3)
EOSINOPHIL # BLD AUTO: 0.2 THOUSAND/ΜL (ref 0–0.61)
EOSINOPHIL NFR BLD AUTO: 4 % (ref 0–6)
ERYTHROCYTE [DISTWIDTH] IN BLOOD BY AUTOMATED COUNT: 14.5 % (ref 11.6–15.1)
GFR SERPL CREATININE-BSD FRML MDRD: 42 ML/MIN/1.73SQ M
GLUCOSE SERPL-MCNC: 124 MG/DL (ref 65–140)
GLUCOSE SERPL-MCNC: 128 MG/DL (ref 65–140)
GLUCOSE SERPL-MCNC: 137 MG/DL (ref 65–140)
GLUCOSE SERPL-MCNC: 146 MG/DL (ref 65–140)
GLUCOSE SERPL-MCNC: 165 MG/DL (ref 65–140)
HCT VFR BLD AUTO: 28.4 % (ref 36.5–49.3)
HGB BLD-MCNC: 9 G/DL (ref 12–17)
IMM GRANULOCYTES # BLD AUTO: 0.03 THOUSAND/UL (ref 0–0.2)
IMM GRANULOCYTES NFR BLD AUTO: 1 % (ref 0–2)
INR PPP: 1.63 (ref 0.84–1.19)
LYMPHOCYTES # BLD AUTO: 0.78 THOUSANDS/ΜL (ref 0.6–4.47)
LYMPHOCYTES NFR BLD AUTO: 14 % (ref 14–44)
MCH RBC QN AUTO: 33.8 PG (ref 26.8–34.3)
MCHC RBC AUTO-ENTMCNC: 31.7 G/DL (ref 31.4–37.4)
MCV RBC AUTO: 107 FL (ref 82–98)
MONOCYTES # BLD AUTO: 0.41 THOUSAND/ΜL (ref 0.17–1.22)
MONOCYTES NFR BLD AUTO: 8 % (ref 4–12)
NEUTROPHILS # BLD AUTO: 4.04 THOUSANDS/ΜL (ref 1.85–7.62)
NEUTS SEG NFR BLD AUTO: 72 % (ref 43–75)
NRBC BLD AUTO-RTO: 0 /100 WBCS
PLATELET # BLD AUTO: 131 THOUSANDS/UL (ref 149–390)
PMV BLD AUTO: 10.8 FL (ref 8.9–12.7)
POTASSIUM SERPL-SCNC: 4.5 MMOL/L (ref 3.5–5.3)
PROT SERPL-MCNC: 6.9 G/DL (ref 6.4–8.2)
PROTHROMBIN TIME: 19 SECONDS (ref 11.6–14.5)
QRS AXIS: -3 DEGREES
QRSD INTERVAL: 94 MS
QT INTERVAL: 340 MS
QTC INTERVAL: 389 MS
RBC # BLD AUTO: 2.66 MILLION/UL (ref 3.88–5.62)
SODIUM SERPL-SCNC: 137 MMOL/L (ref 136–145)
T WAVE AXIS: 11 DEGREES
VENTRICULAR RATE: 79 BPM
WBC # BLD AUTO: 5.49 THOUSAND/UL (ref 4.31–10.16)

## 2020-08-24 PROCEDURE — 80053 COMPREHEN METABOLIC PANEL: CPT | Performed by: FAMILY MEDICINE

## 2020-08-24 PROCEDURE — 71045 X-RAY EXAM CHEST 1 VIEW: CPT

## 2020-08-24 PROCEDURE — 93010 ELECTROCARDIOGRAM REPORT: CPT | Performed by: INTERNAL MEDICINE

## 2020-08-24 PROCEDURE — 99223 1ST HOSP IP/OBS HIGH 75: CPT | Performed by: INTERNAL MEDICINE

## 2020-08-24 PROCEDURE — 85025 COMPLETE CBC W/AUTO DIFF WBC: CPT | Performed by: FAMILY MEDICINE

## 2020-08-24 PROCEDURE — 82948 REAGENT STRIP/BLOOD GLUCOSE: CPT

## 2020-08-24 PROCEDURE — 99233 SBSQ HOSP IP/OBS HIGH 50: CPT | Performed by: FAMILY MEDICINE

## 2020-08-24 PROCEDURE — 85610 PROTHROMBIN TIME: CPT | Performed by: FAMILY MEDICINE

## 2020-08-24 RX ADMIN — FUROSEMIDE 40 MG: 10 INJECTION, SOLUTION INTRAMUSCULAR; INTRAVENOUS at 05:08

## 2020-08-24 RX ADMIN — DIGOXIN 250 MCG: 250 TABLET ORAL at 08:00

## 2020-08-24 RX ADMIN — FUROSEMIDE 40 MG: 10 INJECTION, SOLUTION INTRAMUSCULAR; INTRAVENOUS at 11:36

## 2020-08-24 RX ADMIN — PANTOPRAZOLE SODIUM 40 MG: 40 TABLET, DELAYED RELEASE ORAL at 05:08

## 2020-08-24 RX ADMIN — INSULIN LISPRO 1 UNITS: 100 INJECTION, SOLUTION INTRAVENOUS; SUBCUTANEOUS at 21:18

## 2020-08-24 RX ADMIN — DOCUSATE SODIUM 100 MG: 100 CAPSULE, LIQUID FILLED ORAL at 18:00

## 2020-08-24 RX ADMIN — CARVEDILOL 6.25 MG: 3.12 TABLET, FILM COATED ORAL at 07:55

## 2020-08-24 RX ADMIN — CARVEDILOL 6.25 MG: 3.12 TABLET, FILM COATED ORAL at 18:00

## 2020-08-24 RX ADMIN — DOCUSATE SODIUM 100 MG: 100 CAPSULE, LIQUID FILLED ORAL at 08:00

## 2020-08-24 RX ADMIN — FUROSEMIDE 40 MG: 10 INJECTION, SOLUTION INTRAMUSCULAR; INTRAVENOUS at 18:00

## 2020-08-24 NOTE — ASSESSMENT & PLAN NOTE
On Coumadin and Coreg 6 25 b i d  INR goal 2-3, continue to monitor  Coumadin dose 2 5 mg daily, except 5 mg on Monday and Thursday    Warfarin on hold for anticipated colonoscopy on Friday, 08/28/2020

## 2020-08-24 NOTE — PLAN OF CARE
Problem: Potential for Falls  Goal: Patient will remain free of falls  Description: INTERVENTIONS:  - Assess patient frequently for physical needs  -  Identify cognitive and physical deficits and behaviors that affect risk of falls  -  [place] fall precautions as indicated by assessment   - Educate patient/family on patient safety including physical limitations  - Instruct patient to call for assistance with activity based on assessment  - Modify environment to reduce risk of injury  - Consider OT/PT consult to assist with strengthening/mobility  Outcome: Progressing     Problem: PAIN - ADULT  Goal: Verbalizes/displays adequate comfort level or baseline comfort level  Description: Interventions:  - Encourage patient to monitor pain and request assistance  - Assess pain using appropriate pain scale  - Administer analgesics based on type and severity of pain and evaluate response  - Implement non-pharmacological measures as appropriate and evaluate response  - Consider cultural and social influences on pain and pain management  - Notify physician/advanced practitioner if interventions unsuccessful or patient reports new pain  Outcome: Progressing     Problem: SAFETY ADULT  Goal: Patient will remain free of falls  Description: INTERVENTIONS:  - Assess patient frequently for physical needs  -  Identify cognitive and physical deficits and behaviors that affect risk of falls    -  [place] fall precautions as indicated by assessment   - Educate patient/family on patient safety including physical limitations  - Instruct patient to call for assistance with activity based on assessment  - Modify environment to reduce risk of injury  - Consider OT/PT consult to assist with strengthening/mobility  Outcome: Progressing  Goal: Maintain or return to baseline ADL function  Description: INTERVENTIONS:  -  Assess patient's ability to carry out ADLs; assess patient's baseline for ADL function and identify physical deficits which impact ability to perform ADLs (bathing, care of mouth/teeth, toileting, grooming, dressing, etc )  - Assess/evaluate cause of self-care deficits   - Assess range of motion  - Assess patient's mobility; develop plan if impaired  - Assess patient's need for assistive devices and provide as appropriate  - Encourage maximum independence but intervene and supervise when necessary  - Involve family in performance of ADLs  - Assess for home care needs following discharge   - Consider OT consult to assist with ADL evaluation and planning for discharge  - Provide patient education as appropriate  Outcome: Progressing  Goal: Maintain or return mobility status to optimal level  Description: INTERVENTIONS:  - Assess patient's baseline mobility status (ambulation, transfers, stairs, etc )    - Identify cognitive and physical deficits and behaviors that affect mobility  - Identify mobility aids required to assist with transfers and/or ambulation (gait belt, sit-to-stand, lift, walker, cane, etc )  - [place] fall precautions as indicated by assessment  - Record patient progress and toleration of activity level on Mobility SBAR; progress patient to next Phase/Stage  - Instruct patient to call for assistance with activity based on assessment  - Consider rehabilitation consult to assist with strengthening/weightbearing, etc   Outcome: Progressing     Problem: DISCHARGE PLANNING  Goal: Discharge to home or other facility with appropriate resources  Description: INTERVENTIONS:  - Identify barriers to discharge w/patient and caregiver  - Arrange for needed discharge resources and transportation as appropriate  - Identify discharge learning needs (meds, wound care, etc )  - Arrange for interpretive services to assist at discharge as needed  - Refer to Case Management Department for coordinating discharge planning if the patient needs post-hospital services based on physician/advanced practitioner order or complex needs related to functional status, cognitive ability, or social support system  Outcome: Progressing     Problem: Knowledge Deficit  Goal: Patient/family/caregiver demonstrates understanding of disease process, treatment plan, medications, and discharge instructions  Description: Complete learning assessment and assess knowledge base    Interventions:  - Provide teaching at level of understanding  - Provide teaching via preferred learning methods  Outcome: Progressing

## 2020-08-24 NOTE — ASSESSMENT & PLAN NOTE
Wt Readings from Last 3 Encounters:   08/24/20 (!) 156 kg (343 lb 11 2 oz)   07/23/20 (!) 153 kg (338 lb)   07/13/20 (!) 158 kg (348 lb)     Acute on Chronic CHF  Most recent echo from 01/2020 - 60% EF  Takes 40 mg Lasix daily  Presenting creatinine is at baseline  The patient's weight actually appears below his recent baseline however he does have right pleural effusion  Weight and output appears to be recorded inaccurately - ensure strict I&Os and standing daily weights  Cr stable - continue Lasix 40mg to TID IV  BMP in a m

## 2020-08-24 NOTE — ASSESSMENT & PLAN NOTE
CTA PE study showed cirrhotic changes to liver  Trace ascites  No known history of cirrhosis    Likely due to nonalcoholic fatty liver disease, will need a full workup   Hepatitis panel reviewed, nonreactive  Monitor CMP - IN[facility] [place] [facility]  Needs outpatient workup by GI

## 2020-08-24 NOTE — PROGRESS NOTES
Progress Note - Lizette Boyd 1960, 63 y o  male MRN: [id number]    Unit/Bed#: 406-01 Encounter: [id number]    Primary Care Provider: Vero Carrero DO   Date and time admitted to hospital: 8/21/2020 11:30 AM        * Acute on chronic diastolic congestive heart failure [facility])  Assessment & Plan  Wt Readings from Last 3 Encounters:   08/24/20 (!) 156 kg (343 lb 11 2 oz)   07/23/20 (!) 153 kg (338 lb)   07/13/20 (!) 158 kg (348 lb)     Acute on Chronic CHF  Most recent echo from 01/2020 - 60% EF  Takes 40 mg Lasix daily  Presenting creatinine is at baseline  The patient's weight actually appears below his recent baseline however he does have right pleural effusion  Weight and output appears to be recorded inaccurately - ensure strict I&Os and standing daily weights  Cr stable - continue Lasix 40mg to TID IV  BMP in a m  Thrombocytopenia ([address] )  Assessment & Plan  In the patient with evidence of liver cirrhosis  Platelet count dropped from borderline at 150 down to 130  Monitor CBC    Chronic anemia  Assessment & Plan  Baseline hemoglobin around 10  Hemoglobin remains at baseline, continue to monitor    Tobacco abuse  Assessment & Plan  Reports smoking about 5cig/day  Declining NRT    Hyperkalemia  Assessment & Plan  Presenting potassium 5 7  In the ED, received calcium gluconate, Kayexalate 15 g, insulin  Resolved with potassium at 4 8  Continue to hold lisinopril for now while being aggressively diuresed  Patient on IV Lasix and expect potassium to continue to trend down - monitor BMP levels    CKD (chronic kidney disease) stage 3, GFR 30-59 ml/min [facility])  Assessment & Plan  Patient likely has a diagnosis of CKD given his GFR values on record  His presenting creatinine appears near baseline, remained stable while on IV diuretics, continue to monitor BMP      Cirrhosis of liver with ascites [facility])  Assessment & Plan  CTA PE study showed cirrhotic changes to liver  Trace ascites    No known history of cirrhosis  Likely due to nonalcoholic fatty liver disease, will need a full workup   Hepatitis panel reviewed, nonreactive  Monitor CMP - INR elevated due to patient being on [facility] [place] with warfarin  Needs outpatient workup by GI    Pleural effusion on right  Assessment & Plan  CTA PE study reveals moderate right pleural effusion  No change with IV Lasix  Will consult pulmonology to consider thoracentesis       STEFF (obstructive sleep apnea)  Assessment & Plan  The patient is being followed as an outpatient  Awaiting CPAP fitting  Hypertension  Assessment & Plan  Home medications are Lasix 40, lisinopril 10 mg, Coreg 6 25 b i d  Lisinopril held initially due to hyperkalemia, will continue for now while the patient is undergoing aggressive diuresis     Class 3 severe obesity due to excess calories with serious comorbidity and body mass index (BMI) of 45 0 to 49 9 in adult [facility])  Assessment & Plan  Would benefit from weight loss  Atrial fibrillation (HCC)  Assessment & Plan  On Coumadin and Coreg 6 25 b i d  INR goal 2-3, continue to monitor  Coumadin dose 2 5 mg daily, except 5 mg on Monday and Thursday  Warfarin on hold for anticipated colonoscopy on Friday, 08/28/2020      VTE Pharmacologic Prophylaxis:   Pharmacologic: Warfarin on hold due to planned colonoscopy, monitor INR and may need to initiate heparin for DVT prophylaxis meanwhile  Mechanical VTE Prophylaxis in Place: Yes    Patient Centered Rounds: I have performed bedside rounds with nursing staff today  Discussions with Specialists or Other Care Team Provider:  Pulmonology    Education and Discussions with Family / Patient:  Patient    Time Spent for Care: 45 minutes  More than 50% of total time spent on counseling and coordination of care as described above      Current Length of Stay: 3 day(s)    Current Patient Status: Inpatient   Certification Statement: The patient will continue to require additional inpatient hospital stay due to IV Lasix, close monitoring    Discharge Plan: To be determined    Code Status: Level 1 - Full Code      Subjective:   Patient seen and examined  He states that after his Lasix was increased he is experiencing increased urine output and feels improvement in his abdominal distension  No overnight events  Objective:     Vitals:   Temp (24hrs), Av 4 °F (36 9 °C), Min:98 2 °F (36 8 °C), Max:98 5 °F (36 9 °C)    Temp:  [98 2 °F (36 8 °C)-98 5 °F (36 9 °C)] 98 5 °F (36 9 °C)  HR:  [59-82] 68  Resp:  [18] 18  BP: (113-127)/(58-83) 125/83  SpO2:  [94 %-100 %] 97 %  Body mass index is 47 94 kg/m²  Input and Output Summary (last 24 hours): Intake/Output Summary (Last 24 hours) at 2020 1319  Last data filed at 2020 1238  Gross per 24 hour   Intake 1020 ml   Output 3325 ml   Net -2305 ml       Physical Exam:     Physical Exam  Constitutional:       Appearance: He is obese  HENT:      Head: Normocephalic and atraumatic  Nose: No congestion  Eyes:      Conjunctiva/sclera: Conjunctivae normal    Cardiovascular:      Rate and Rhythm: Normal rate  Rhythm irregular  Heart sounds: No murmur  Pulmonary:      Effort: Pulmonary effort is normal       Breath sounds: No wheezing  Comments: Rales right lower lobe  Abdominal:      General: There is distension  Tenderness: There is no abdominal tenderness  There is no guarding  Musculoskeletal:      Left lower leg: Edema present  Skin:     Coloration: Skin is not jaundiced  Comments: Venous insufficiency related skin changes   Neurological:      General: No focal deficit present  Mental Status: He is oriented to person, place, and time     Psychiatric:         Mood and Affect: Mood normal          Additional Data:     Labs:    Results from last 7 days   Lab Units 20  0444   WBC Thousand/uL 5 49   HEMOGLOBIN g/dL 9 0*   HEMATOCRIT % 28 4*   PLATELETS Thousands/uL 131*   NEUTROS PCT % 72   LYMPHS PCT % 14   MONOS PCT % 8 EOS PCT % 4     Results from last 7 days   Lab Units 08/24/20  0444   SODIUM mmol/L 137   POTASSIUM mmol/L 4 5   CHLORIDE mmol/L 103   CO2 mmol/L 29   BUN mg/dL 24   CREATININE mg/dL 1 73*   ANION GAP mmol/L 5   CALCIUM mg/dL 8 6   ALBUMIN g/dL 2 8*   TOTAL BILIRUBIN mg/dL 0 80   ALK PHOS U/L 121*   ALT U/L 18   AST U/L 24   GLUCOSE RANDOM mg/dL 128     Results from last 7 days   Lab Units 08/24/20  0444   INR  1 63*     Results from last 7 days   Lab Units 08/24/20  1140 08/24/20  0737 08/23/20  2103 08/23/20  1613 08/23/20  1127 08/23/20  0725 08/22/20  2102 08/22/20  1545 08/22/20  1115 08/22/20  0709 08/21/20  2117 08/21/20  1627   POC GLUCOSE mg/dl 146* 124 127 126 128 130 148* 131 135 110 117 128         Results from last 7 days   Lab Units 08/21/20  1633   PROCALCITONIN ng/ml 0 17           * I Have Reviewed All Lab Data Listed Above  * Additional Pertinent Lab Tests Reviewed:  [facility] Admission Reviewed    Imaging:    Imaging/Reports Reviewed Today Include:  Portable chest x-ray      Recent Cultures (last 7 days):           Last 24 Hours Medication List:   Current Facility-Administered Medications   Medication Dose Route Frequency Provider Last Rate    acetaminophen  650 mg Oral Q6H PRN Shannen Land MD      carvedilol  6 25 mg Oral BID With Meals Lesly Lovell PA-C      digoxin  250 mcg Oral Daily Lesly Lovell PA-C      docusate sodium  100 mg Oral BID Lesly Lovell PA-C      furosemide  40 mg Intravenous TID (diuretic) Shannen aLnd MD      insulin lispro  1-5 Units Subcutaneous TID AC Lesly Lovell PA-C      insulin lispro  1-5 Units Subcutaneous HS Lesly Lovell PA-C      ondansetron  4 mg Intravenous Q6H PRN Lesly Lovell PA-C      pantoprazole  40 mg Oral Early Morning Lesly Lovell PA-C          Today, Patient Was Seen By: Israel Varela MD    ** Please Note: Dictation voice to text software may have been used in the creation of this document   **

## 2020-08-24 NOTE — ASSESSMENT & PLAN NOTE
Patient likely has a diagnosis of CKD given his GFR values on record    His presenting creatinine appears near baseline, remained stable while on IV diuretics, continue to monitor BMP

## 2020-08-24 NOTE — ASSESSMENT & PLAN NOTE
Home medications are Lasix 40, lisinopril 10 mg, Coreg 6 25 b i d    Lisinopril held initially due to hyperkalemia, will continue for now while the patient is undergoing aggressive diuresis

## 2020-08-24 NOTE — ASSESSMENT & PLAN NOTE
In the patient with evidence of liver cirrhosis  Platelet count dropped from borderline at 150 down to 130  Monitor CBC

## 2020-08-24 NOTE — CONSULTS
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  2  Cirrhosis of the liver  3  Chronic diastolic congestive heart failure  4  Thrombocytopenia associated with cirrhosis  5  Coumadin coagulopathy  6  CKD stage 3   7  Pulmonary hypertension  8  Atrial fibrillation  9  Morbid obesity  10  Tobacco abuse  11  Obstructive sleep apnea  Recommendations:  1  Will order thoracentesis  This would be for diagnostic rather than therapeutic purposes, for the unlikely chance he has a secondary process such as malignancy or spontaneous bacterial empyema causing this effusion  I feel relatively confident this is hepatic hydrothorax  If he is ready to be discharged prior to the thoracentesis being completed, this can be done as an outpatient  2  In advance, good chance this is going to be a trapped lung given the presence of the effusion for at least 4 months  Ex vacuo pneumothorax may be seen post-thoracentesis  If patient still hospitalized Wednesday we will perform the procedure bedside and perform pleural manometry (if hasn't been done by then)  3  Given symptomatic benefit from bronchodilators, recommend discharging with a rescue inhaler  History of Present Illness   HPI:  Andrés Baez is a 63 y o  male who was admitted for abdominal distention and shortness of breath  In retrospect, he has had imaging findings consistent with cirrhosis of the liver as far back as April of this year, but denies being aware of that diagnosis  He does have a diagnosis of atrial fibrillation for which he is on Coumadin, as well as diabetes, hypertension, severe obstructive sleep apnea, ongoing tobacco abuse with a 5 cigarette per day habit, congestive heart failure with preserved ejection fraction, and CKD stage 3   On this admission, he had a CT of the abdomen and pelvis, demonstrating mesenteric edema as well as a small amount of ascites  He was also noted to have a moderate right pleural effusion, which has persisted despite adequate diuresis over the course of his hospital stay  He remains asymptomatic with respect to the effusion, noting that he is chronically short of breath with walking up a flight of stairs, which has not changed recently  He denies cough  He did note that he feels better since admission, which he attributes more to the diuretic, but also notes that there may be a contribution of the bronchodilator he received at admission  He notes chronic lower extremity severe edema, which is worsening as well  Otherwise a 12 point review of systems was negative  When asked why he is still here, he is unsure why, but does note that he has a pending outpatient appointment with gastroenterology on Friday, during which he is supposed undergo colonoscopy with polyp resection as well as liver biopsy  Interestingly, 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, and noted that his co-worker once was arrested for DUI despite not drinking due to absorbing the alcohol used in finishing the methotrexate product through his scan, suggesting that the patient may have a significant lifelong methotrexate exposure  Review of systems:  Review of Systems   Constitutional: Negative for chills and fever  Respiratory: Positive for shortness of breath  Negative for cough, chest tightness and wheezing  Cardiovascular: Positive for leg swelling  All other systems reviewed and are negative  All other 12-point review of systems are negative      Historical Information   Past Medical History:   Diagnosis Date    A-fib [facility])     Cardiac disease     Chronic combined systolic (congestive) and diastolic (congestive) heart failure ([facility] )     Diabetes mellitus ([place] [address] )     Dilated cardiomyopathy ([place] [address] )     History of echocardiogram 11/24/2014    EF 0 30 (30%), Likely mod LV systolic dysfunction  Likely RV dysfunction as well   History of Lexiscan MPI 02/19/2016    EF 0 43 (43%), no prior MI or ischemia   Hx of echocardiogram 04/28/2017    Normal EF, Normal LV systolic function  Mild concentric LV hypertrophy  Mild mitral and tricuspid regurg   Hx: recurrent pneumonia     Hypertension     Long term (current) use of anticoagulants     Morbid (severe) obesity due to excess calories (HCC)     Neuropathy      Past Surgical History:   Procedure Laterality Date    HERNIA REPAIR      SPLENECTOMY, TOTAL      VASCULAR SURGERY Right     leg     History reviewed  No pertinent family history  Tobacco history:  Current smoker of 5 cigarettes per day  Otherwise smoked about a pack per day for the majority of his life, with the exception of a 7 year period where he was able to quit  Family history:  Noncontributory  Meds/Allergies   Current Facility-Administered Medications   Medication Dose Route Frequency    acetaminophen (TYLENOL) tablet 650 mg  650 mg Oral Q6H PRN    carvedilol (COREG) tablet 6 25 mg  6 25 mg Oral BID With Meals    digoxin (LANOXIN) tablet 250 mcg  250 mcg Oral Daily    docusate sodium (COLACE) capsule 100 mg  100 mg Oral BID    furosemide (LASIX) injection 40 mg  40 mg Intravenous TID (diuretic)    insulin lispro (HumaLOG) 100 units/mL subcutaneous injection 1-5 Units  1-5 Units Subcutaneous TID AC    insulin lispro (HumaLOG) 100 units/mL subcutaneous injection 1-5 Units  1-5 Units Subcutaneous HS    ondansetron (ZOFRAN) injection 4 mg  4 mg Intravenous Q6H PRN    pantoprazole (PROTONIX) EC tablet 40 mg  40 mg Oral Early Morning     Allergies   Allergen Reactions    Eggs Or Egg-Derived Products GI Intolerance       Vitals: Blood pressure 125/83, pulse 68, temperature 98 5 °F (36 9 °C), resp   rate 18, height 5' 11" (1 803 m), weight (!) 156 kg (343 lb 11 2 oz), SpO2 97 % , on room air, Body mass index is 47 94 kg/m²  Intake/Output Summary (Last 24 hours) at 8/24/2020 1458  Last data filed at 8/24/2020 1238  Gross per 24 hour   Intake 1020 ml   Output 3325 ml   Net -2305 ml     Physical exam:     Physical Exam  Vitals signs and nursing note reviewed  Constitutional:       Appearance: He is well-developed  He is obese  He is not ill-appearing, toxic-appearing or diaphoretic  HENT:      Head: Normocephalic and atraumatic  Mouth/Throat:      Mouth: Mucous membranes are moist       Pharynx: Oropharynx is clear  No oropharyngeal exudate  Eyes:      Extraocular Movements: Extraocular movements intact  Pupils: Pupils are equal, round, and reactive to light  Cardiovascular:      Rate and Rhythm: Normal rate  Rhythm irregular  Heart sounds: No murmur  No friction rub  No gallop  Pulmonary:      Effort: Pulmonary effort is normal  No tachypnea or accessory muscle usage  Breath sounds: Examination of the right-middle field reveals decreased breath sounds  Examination of the right-lower field reveals decreased breath sounds  Decreased breath sounds present  Comments: Mildly decreased at 2/3 of his right lung field on up, increasing to significantly decreased at the level of 1/3 of the way up  Abdominal:      Palpations: Abdomen is soft  Tenderness: There is no guarding  Musculoskeletal:      Comments: Severe bilateral lower extremity edema with siderosis and venous stasis changes  3+ bilaterally, no oozing noted  Skin:     General: Skin is warm and dry  Findings: No rash  Neurological:      General: No focal deficit present  Mental Status: He is alert and oriented to person, place, and time  Psychiatric:         Mood and Affect: Mood normal  Mood is not anxious  Behavior: Behavior normal        Labs:  I have personally reviewed pertinent lab results  Results from last 7 days   Lab Units 08/24/20  0444 08/23/20  0455 08/22/20  0551   WBC Thousand/uL 5 49 5 01 5 19   HEMOGLOBIN g/dL 9 0* 9 5* 8 7*   HEMATOCRIT % 28 4* 30 2* 27 1*   PLATELETS Thousands/uL 131* 147* 134*         Results from last 7 days   Lab Units 08/24/20  0444 08/23/20  0455 08/22/20  0551  08/21/20  1141   POTASSIUM mmol/L 4 5 4 8 5 0   < > 5 7*   CHLORIDE mmol/L 103 103 102   < > 99*   CO2 mmol/L 29 26 26   < > 25   BUN mg/dL 24 24 25   < > 24   CREATININE mg/dL 1 73* 1 69* 1 65*   < > 1 70*   CALCIUM mg/dL 8 6 8 6 8 5   < > 8 7   ALK PHOS U/L 121*  --  112  --  132*   ALT U/L 18  --  19  --  21   AST U/L 24  --  20  --  30    < > = values in this interval not displayed  Results from last 7 days   Lab Units 08/24/20  0444 08/23/20  0455 08/21/20  1141   INR  1 63* 1 83* 2 12*   PTT seconds  --   --  45*     Results from last 7 days   Lab Units 08/22/20  0551   MAGNESIUM mg/dL 1 9     Coumadin coagulopathy noted  Imaging and other studies: I have personally reviewed pertinent films in PACS CT chest noted with increased pleural effusion over April, simple appearance to fluid  Code Status: Level 1 - Full Code    Thank you for allowing us to participate in the care of your patient      Bernard Streeter MD

## 2020-08-24 NOTE — SOCIAL WORK
Continuing to follow pt  Acute on chronic diastolic CHF- +diuresing (IV Lasix tid); pleural effusion  Doing strict I*O's and daily weights  Plans are home on dc with OP follow up after dc  CM will follow and assist in dc planning

## 2020-08-24 NOTE — ASSESSMENT & PLAN NOTE
CTA PE study reveals moderate right pleural effusion    No change with IV Lasix  Will consult pulmonology to consider thoracentesis

## 2020-08-25 ENCOUNTER — APPOINTMENT (INPATIENT)
Dept: INTERVENTIONAL RADIOLOGY/VASCULAR | Facility: HOSPITAL | Age: 60
DRG: 291 | End: 2020-08-25
Attending: INTERNAL MEDICINE
Payer: MEDICARE

## 2020-08-25 LAB
ALBUMIN SERPL BCP-MCNC: 2.9 G/DL (ref 3.5–5)
ALP SERPL-CCNC: 122 U/L (ref 46–116)
ALT SERPL W P-5'-P-CCNC: 26 U/L (ref 12–78)
ANION GAP SERPL CALCULATED.3IONS-SCNC: 6 MMOL/L (ref 4–13)
APPEARANCE FLD: CLEAR
AST SERPL W P-5'-P-CCNC: 22 U/L (ref 5–45)
BASOPHILS # BLD AUTO: 0.04 THOUSANDS/ΜL (ref 0–0.1)
BASOPHILS NFR BLD AUTO: 1 % (ref 0–1)
BILIRUB SERPL-MCNC: 1 MG/DL (ref 0.2–1)
BUN SERPL-MCNC: 23 MG/DL (ref 5–25)
CALCIUM SERPL-MCNC: 8.7 MG/DL (ref 8.3–10.1)
CHLORIDE SERPL-SCNC: 101 MMOL/L (ref 100–108)
CO2 SERPL-SCNC: 31 MMOL/L (ref 21–32)
COLOR FLD: YELLOW
CREAT SERPL-MCNC: 1.68 MG/DL (ref 0.6–1.3)
EOSINOPHIL # BLD AUTO: 0.19 THOUSAND/ΜL (ref 0–0.61)
EOSINOPHIL NFR BLD AUTO: 3 % (ref 0–6)
ERYTHROCYTE [DISTWIDTH] IN BLOOD BY AUTOMATED COUNT: 14.4 % (ref 11.6–15.1)
GFR SERPL CREATININE-BSD FRML MDRD: 43 ML/MIN/1.73SQ M
GLUCOSE FLD-MCNC: 130 MG/DL
GLUCOSE SERPL-MCNC: 104 MG/DL (ref 65–140)
GLUCOSE SERPL-MCNC: 107 MG/DL (ref 65–140)
GLUCOSE SERPL-MCNC: 111 MG/DL (ref 65–140)
GLUCOSE SERPL-MCNC: 113 MG/DL (ref 65–140)
GLUCOSE SERPL-MCNC: 145 MG/DL (ref 65–140)
HCT VFR BLD AUTO: 30 % (ref 36.5–49.3)
HGB BLD-MCNC: 9.4 G/DL (ref 12–17)
IMM GRANULOCYTES # BLD AUTO: 0.04 THOUSAND/UL (ref 0–0.2)
IMM GRANULOCYTES NFR BLD AUTO: 1 % (ref 0–2)
INR PPP: 1.59 (ref 0.84–1.19)
LDH FLD L TO P-CCNC: 100 U/L
LDH SERPL-CCNC: 156 U/L (ref 81–234)
LYMPHOCYTES # BLD AUTO: 0.9 THOUSANDS/ΜL (ref 0.6–4.47)
LYMPHOCYTES NFR BLD AUTO: 14 % (ref 14–44)
LYMPHOCYTES NFR BLD AUTO: 16 %
MCH RBC QN AUTO: 33.3 PG (ref 26.8–34.3)
MCHC RBC AUTO-ENTMCNC: 31.3 G/DL (ref 31.4–37.4)
MCV RBC AUTO: 106 FL (ref 82–98)
MONO+MESO NFR FLD MANUAL: 3 %
MONOCYTES # BLD AUTO: 0.5 THOUSAND/ΜL (ref 0.17–1.22)
MONOCYTES NFR BLD AUTO: 51 %
MONOCYTES NFR BLD AUTO: 8 % (ref 4–12)
NEUTROPHILS # BLD AUTO: 4.59 THOUSANDS/ΜL (ref 1.85–7.62)
NEUTS SEG NFR BLD AUTO: 30 %
NEUTS SEG NFR BLD AUTO: 73 % (ref 43–75)
NRBC BLD AUTO-RTO: 0 /100 WBCS
PH BODY FLUID: 7.8
PLATELET # BLD AUTO: 142 THOUSANDS/UL (ref 149–390)
PMV BLD AUTO: 11 FL (ref 8.9–12.7)
POTASSIUM SERPL-SCNC: 4.2 MMOL/L (ref 3.5–5.3)
PROT FLD-MCNC: 2.8 G/DL
PROT SERPL-MCNC: 7.3 G/DL (ref 6.4–8.2)
PROTHROMBIN TIME: 18.7 SECONDS (ref 11.6–14.5)
RBC # BLD AUTO: 2.82 MILLION/UL (ref 3.88–5.62)
SITE: NORMAL
SODIUM SERPL-SCNC: 138 MMOL/L (ref 136–145)
TOTAL CELLS COUNTED SPEC: 100
WBC # BLD AUTO: 6.26 THOUSAND/UL (ref 4.31–10.16)
WBC # FLD MANUAL: 409 /UL

## 2020-08-25 PROCEDURE — 88305 TISSUE EXAM BY PATHOLOGIST: CPT | Performed by: PATHOLOGY

## 2020-08-25 PROCEDURE — 97163 PT EVAL HIGH COMPLEX 45 MIN: CPT

## 2020-08-25 PROCEDURE — 99232 SBSQ HOSP IP/OBS MODERATE 35: CPT | Performed by: FAMILY MEDICINE

## 2020-08-25 PROCEDURE — 97167 OT EVAL HIGH COMPLEX 60 MIN: CPT

## 2020-08-25 PROCEDURE — 32555 ASPIRATE PLEURA W/ IMAGING: CPT

## 2020-08-25 PROCEDURE — 32555 ASPIRATE PLEURA W/ IMAGING: CPT | Performed by: RADIOLOGY

## 2020-08-25 PROCEDURE — 87070 CULTURE OTHR SPECIMN AEROBIC: CPT | Performed by: PHYSICIAN ASSISTANT

## 2020-08-25 PROCEDURE — 99232 SBSQ HOSP IP/OBS MODERATE 35: CPT | Performed by: PHYSICIAN ASSISTANT

## 2020-08-25 PROCEDURE — 85025 COMPLETE CBC W/AUTO DIFF WBC: CPT | Performed by: FAMILY MEDICINE

## 2020-08-25 PROCEDURE — 85610 PROTHROMBIN TIME: CPT | Performed by: FAMILY MEDICINE

## 2020-08-25 PROCEDURE — 84157 ASSAY OF PROTEIN OTHER: CPT | Performed by: PHYSICIAN ASSISTANT

## 2020-08-25 PROCEDURE — 82948 REAGENT STRIP/BLOOD GLUCOSE: CPT

## 2020-08-25 PROCEDURE — 83986 ASSAY PH BODY FLUID NOS: CPT | Performed by: PHYSICIAN ASSISTANT

## 2020-08-25 PROCEDURE — NC001 PR NO CHARGE: Performed by: RADIOLOGY

## 2020-08-25 PROCEDURE — 88112 CYTOPATH CELL ENHANCE TECH: CPT | Performed by: PATHOLOGY

## 2020-08-25 PROCEDURE — 82945 GLUCOSE OTHER FLUID: CPT | Performed by: PHYSICIAN ASSISTANT

## 2020-08-25 PROCEDURE — 89051 BODY FLUID CELL COUNT: CPT | Performed by: PHYSICIAN ASSISTANT

## 2020-08-25 PROCEDURE — 0W993ZZ DRAINAGE OF RIGHT PLEURAL CAVITY, PERCUTANEOUS APPROACH: ICD-10-PCS | Performed by: FAMILY MEDICINE

## 2020-08-25 PROCEDURE — 87205 SMEAR GRAM STAIN: CPT | Performed by: PHYSICIAN ASSISTANT

## 2020-08-25 PROCEDURE — 80053 COMPREHEN METABOLIC PANEL: CPT | Performed by: FAMILY MEDICINE

## 2020-08-25 PROCEDURE — 83615 LACTATE (LD) (LDH) ENZYME: CPT | Performed by: PHYSICIAN ASSISTANT

## 2020-08-25 RX ORDER — LIDOCAINE HYDROCHLORIDE 10 MG/ML
INJECTION, SOLUTION EPIDURAL; INFILTRATION; INTRACAUDAL; PERINEURAL CODE/TRAUMA/SEDATION MEDICATION
Status: COMPLETED | OUTPATIENT
Start: 2020-08-25 | End: 2020-08-25

## 2020-08-25 RX ADMIN — FUROSEMIDE 40 MG: 10 INJECTION, SOLUTION INTRAMUSCULAR; INTRAVENOUS at 05:49

## 2020-08-25 RX ADMIN — DIGOXIN 250 MCG: 250 TABLET ORAL at 08:01

## 2020-08-25 RX ADMIN — FUROSEMIDE 40 MG: 10 INJECTION, SOLUTION INTRAMUSCULAR; INTRAVENOUS at 12:48

## 2020-08-25 RX ADMIN — CARVEDILOL 6.25 MG: 3.12 TABLET, FILM COATED ORAL at 08:01

## 2020-08-25 RX ADMIN — FUROSEMIDE 40 MG: 10 INJECTION, SOLUTION INTRAMUSCULAR; INTRAVENOUS at 17:24

## 2020-08-25 RX ADMIN — DOCUSATE SODIUM 100 MG: 100 CAPSULE, LIQUID FILLED ORAL at 08:01

## 2020-08-25 RX ADMIN — DOCUSATE SODIUM 100 MG: 100 CAPSULE, LIQUID FILLED ORAL at 17:24

## 2020-08-25 RX ADMIN — CARVEDILOL 6.25 MG: 3.12 TABLET, FILM COATED ORAL at 17:24

## 2020-08-25 RX ADMIN — PANTOPRAZOLE SODIUM 40 MG: 40 TABLET, DELAYED RELEASE ORAL at 05:49

## 2020-08-25 RX ADMIN — LIDOCAINE HYDROCHLORIDE 10 ML: 10 INJECTION, SOLUTION EPIDURAL; INFILTRATION; INTRACAUDAL; PERINEURAL at 15:19

## 2020-08-25 NOTE — ASSESSMENT & PLAN NOTE
Wt Readings from Last 3 Encounters:   08/25/20 (!) 152 kg (334 lb 14 1 oz)   07/23/20 (!) 153 kg (338 lb)   07/13/20 (!) 158 kg (348 lb)     Acute on Chronic CHF  Most recent echo from 01/2020 - 60% EF  Lasix IV TID, weights declining but suspect inaccuracy   Thoracentesis tomorrow

## 2020-08-25 NOTE — PLAN OF CARE
Problem: Potential for Falls  Goal: Patient will remain free of falls  Description: INTERVENTIONS:  - Assess patient frequently for physical needs  -  Identify cognitive and physical deficits and behaviors that affect risk of falls  -  [place] fall precautions as indicated by assessment   - Educate patient/family on patient safety including physical limitations  - Instruct patient to call for assistance with activity based on assessment  - Modify environment to reduce risk of injury  - Consider OT/PT consult to assist with strengthening/mobility  Outcome: Progressing     Problem: PAIN - ADULT  Goal: Verbalizes/displays adequate comfort level or baseline comfort level  Description: Interventions:  - Encourage patient to monitor pain and request assistance  - Assess pain using appropriate pain scale  - Administer analgesics based on type and severity of pain and evaluate response  - Implement non-pharmacological measures as appropriate and evaluate response  - Consider cultural and social influences on pain and pain management  - Notify physician/advanced practitioner if interventions unsuccessful or patient reports new pain  Outcome: Progressing     Problem: SAFETY ADULT  Goal: Patient will remain free of falls  Description: INTERVENTIONS:  - Assess patient frequently for physical needs  -  Identify cognitive and physical deficits and behaviors that affect risk of falls    -  [place] fall precautions as indicated by assessment   - Educate patient/family on patient safety including physical limitations  - Instruct patient to call for assistance with activity based on assessment  - Modify environment to reduce risk of injury  - Consider OT/PT consult to assist with strengthening/mobility  Outcome: Progressing  Goal: Maintain or return to baseline ADL function  Description: INTERVENTIONS:  -  Assess patient's ability to carry out ADLs; assess patient's baseline for ADL function and identify physical deficits which impact ability to perform ADLs (bathing, care of mouth/teeth, toileting, grooming, dressing, etc )  - Assess/evaluate cause of self-care deficits   - Assess range of motion  - Assess patient's mobility; develop plan if impaired  - Assess patient's need for assistive devices and provide as appropriate  - Encourage maximum independence but intervene and supervise when necessary  - Involve family in performance of ADLs  - Assess for home care needs following discharge   - Consider OT consult to assist with ADL evaluation and planning for discharge  - Provide patient education as appropriate  Outcome: Progressing  Goal: Maintain or return mobility status to optimal level  Description: INTERVENTIONS:  - Assess patient's baseline mobility status (ambulation, transfers, stairs, etc )    - Identify cognitive and physical deficits and behaviors that affect mobility  - Identify mobility aids required to assist with transfers and/or ambulation (gait belt, sit-to-stand, lift, walker, cane, etc )  - [place] fall precautions as indicated by assessment  - Record patient progress and toleration of activity level on Mobility SBAR; progress patient to next Phase/Stage  - Instruct patient to call for assistance with activity based on assessment  - Consider rehabilitation consult to assist with strengthening/weightbearing, etc   Outcome: Progressing     Problem: DISCHARGE PLANNING  Goal: Discharge to home or other facility with appropriate resources  Description: INTERVENTIONS:  - Identify barriers to discharge w/patient and caregiver  - Arrange for needed discharge resources and transportation as appropriate  - Identify discharge learning needs (meds, wound care, etc )  - Arrange for interpretive services to assist at discharge as needed  - Refer to Case Management Department for coordinating discharge planning if the patient needs post-hospital services based on physician/advanced practitioner order or complex needs related to functional status, cognitive ability, or social support system  Outcome: Progressing     Problem: Knowledge Deficit  Goal: Patient/family/caregiver demonstrates understanding of disease process, treatment plan, medications, and discharge instructions  Description: Complete learning assessment and assess knowledge base    Interventions:  - Provide teaching at level of understanding  - Provide teaching via preferred learning methods  Outcome: Progressing

## 2020-08-25 NOTE — OCCUPATIONAL THERAPY NOTE
Occupational Therapy Evaluation     Patient Name: Clint Miles  YMOSQ'Y Date: 8/25/2020  Problem List  Principal Problem:    Acute on chronic diastolic congestive heart failure ([facility] )  Active Problems:    Atrial fibrillation (HCC)    Diabetes mellitus (HCC)    Class 3 severe obesity due to excess calories with serious comorbidity and body mass index (BMI) of 45 0 to 49 9 in adult [facility])    Hypertension    STEFF (obstructive sleep apnea)    Pleural effusion on right    SOB (shortness of breath)    Cirrhosis of liver with ascites (HCC)    CKD (chronic kidney disease) stage 3, GFR 30-59 ml/min (HCC)    Hyperkalemia    Tobacco abuse    Chronic anemia    Thrombocytopenia (HCC)    Past Medical History  Past Medical History:   Diagnosis Date    A-fib [facility])     Cardiac disease     Chronic combined systolic (congestive) and diastolic (congestive) heart failure (HCC)     Diabetes mellitus ([facility] )     Dilated cardiomyopathy ([place] [address] )     History of echocardiogram 11/24/2014    EF 0 30 (30%), Likely mod LV systolic dysfunction  Likely RV dysfunction as well   History of Lexiscan MPI 02/19/2016    EF 0 43 (43%), no prior MI or ischemia   Hx of echocardiogram 04/28/2017    Normal EF, Normal LV systolic function  Mild concentric LV hypertrophy  Mild mitral and tricuspid regurg      Hx: recurrent pneumonia     Hypertension     Long term (current) use of anticoagulants     Morbid (severe) obesity due to excess calories (HCC)     Neuropathy      Past Surgical History  Past Surgical History:   Procedure Laterality Date    HERNIA REPAIR      SPLENECTOMY, TOTAL      VASCULAR SURGERY Right     leg             08/25/20 1345   Note Type   Note type Eval/Treat   Restrictions/Precautions   Weight Bearing Precautions Per Order No   Other Precautions Fall Risk   Pain Assessment   Pain Assessment Tool Pain Assessment not indicated - pt denies pain   Home Living   Type of [address] Two level;Bed/bath upstairs;Stairs to enter with rails; Other (Comment)  (4 EZEKIEL c HR; 12 steps to 2nd c HR)   Bathroom Shower/Tub Tub/shower unit   Bathroom Toilet Standard   Bathroom Accessibility Accessible   Home Equipment Cane   Additional Comments pt reports use of SPC at baseline during functional mobility   Prior Function   Level of [place] Independent with ADLs and functional mobility; Needs assistance with IADLs   Lives With Spouse   Receives Help From Family   ADL Assistance Independent   IADLs Needs assistance   Falls in the last 6 months 0   Vocational Retired   Comments pt is (I) with driving   Psychosocial   Psychosocial (WDL) WDL   Subjective   Subjective "I feel much better"   ADL   Where Assessed Other (Comment)  (bathroom)   Grooming Assistance 5  Supervision/Setup   LB Dressing Assistance 5  Supervision/Setup   Toileting Assistance  5  Supervision/Setup   Additional Comments performed with increased time   Bed Mobility   Additional Comments pt standing in room at start of session and in chair at end of session; pt on RA during session; Spo2 WFL with mild SOB during session   Transfers   Sit to Stand 5  Supervision   Stand to Sit 5  Supervision   Additional Comments SPC with no LOB or instability   Functional Mobility   Functional Mobility 5  Supervision   Additional Comments pt performed functional mobility in hallway ~200ft with SPC; no LOB   Additional items SPC   Balance   Static Sitting Good   Dynamic Sitting Good   Static Standing Fair +   Dynamic Standing Fair +   Ambulatory Fair +   Activity Tolerance   Activity Tolerance Patient limited by fatigue   RUE Assessment   RUE Assessment WFL   LUE Assessment   LUE Assessment WFL   Hand Function   Gross Motor Coordination Functional   Fine Motor Coordination Functional   Sensation   Light Touch No apparent deficits   Sharp/Dull No apparent deficits   Cognition   Overall Cognitive Status WFL   Arousal/Participation Alert   Attention Within functional limits Orientation Level Oriented X4   Memory Within functional limits   Following Commands Follows all commands and directions without difficulty   Assessment   Limitation Decreased ADL status; Decreased UE strength;Decreased Safe judgement during ADL;Decreased endurance;Decreased self-care trans;Decreased high-level ADLs   Assessment Pt is a 63 y o  male seen for OT evaluation s/p admit to [facility] on 8/21/2020 w/ Acute on chronic diastolic congestive heart failure ([facility] )  Comorbidities affecting pt's functional performance at time of assessment include: DM, HTN, obesity, CHF, neuropathy and afib, cardiac disease, cardiomyopathy  Personal factors affecting pt at time of IE include:steps to enter environment, behavioral pattern, difficulty performing ADLS, difficulty performing IADLS , limited insight into deficits, compliance, decreased initiation and engagement  and health management   Prior to admission, pt was (I) with ADLs and (A) with IADLs with SPC during functional mobility  Upon evaluation: Pt requires (S) level with use of SPC during functional mobility 2* the following deficits impacting occupational performance: weakness, decreased strength, decreased balance, decreased tolerance, impaired initiation, decreased safety awareness and impaired interpersonal skills  Pt to benefit from continued skilled OT tx while in the hospital to address deficits as defined above and maximize level of functional independence w ADL's and functional mobility  Occupational Performance areas to address include: grooming, bathing/shower, toilet hygiene, dressing, functional mobility, community mobility and clothing management  From OT standpoint, recommendation at time of d/c would be home with home OP PT      Goals   Patient Goals to go home    Short Term Goal  pt will perform UE strengthening exercises    Long Term Goal #1 pt will demonstrate toilet transfers and hygiene at (I) level    Long Term Goal #2 pt will increase independence with functional mobility with SPC to mod (I) level   Long Term Goal pt will demonstrate UB/LB bathing and grooming tasks at (I) level   Plan   Treatment Interventions ADL retraining;Functional transfer training;UE strengthening/ROM; Endurance training;Patient/family training; Activityengagement   Goal Expiration Date 09/08/20   OT Frequency 3-5x/wk   Recommendation   OT Discharge Recommendation Home with skilled therapy  (OP PT)   Barthel Index   Feeding 10   Bathing 0   Grooming Score 0   Dressing Score 5   Bladder Score 10   Bowels Score 10   Toilet Use Score 5   Transfers (Bed/Chair) Score 10   Mobility (Level Surface) Score 10   Stairs Score 0   Barthel Index Score 60     Pt will benefit from continued OT services in order to maximize (I) c ADL performance, FM c SPC, and improve overall endurance/strength required to complete functional tasks in preparation for d/c  Pt left seated in chair at end of session; all needs within reach; all lines intact

## 2020-08-25 NOTE — PLAN OF CARE
Problem: PHYSICAL THERAPY ADULT  Goal: Performs mobility at highest level of function for planned discharge setting  See evaluation for individualized goals  Description: Treatment/Interventions: Functional transfer training, LE strengthening/ROM, Elevations, Therapeutic exercise, Endurance training, Bed mobility, Gait training          See flowsheet documentation for full assessment, interventions and recommendations  Note: Prognosis: Good  Problem List: Decreased strength, Decreased endurance, Impaired balance, Decreased mobility, Obesity  Assessment: Patient is a 63 y o  male evaluated by Physical Therapy s/p admit to [address] [place] [address],[address] on 8/21/2020 with admitting diagnosis of: Shortness of breath, Hyperkalemia, Anasarca, Hyponatremia, Renal insufficiency, Ascites, and principal problem of: Acute on chronic diastolic congestive heart failure  PT was consulted to assess patient's functional mobility and discharge needs  Ordered are PT Evaluation and treatment with activity level of: ambulate patient  Comorbidities affecting patient's physical performance at time of assessment include: a-fib, DM, neuropathy, HTN, cardiac disease, obesity, CHF  Personal factors affecting the patient at time of IE include: lives in 2 story home, ambulating with assistive device, step(s) to enter home, h/o non-compliance, inability/difficulty performing IADLs and inability/difficulty performing ADLs  Please locate objective findings from PT assessment regarding body systems outlined above  Upon evaluation, pt able to perform all functional mobility with SUP, SPC, and increased time  Pt provided with occasional verbal cuing for direction  Pt with dysfunctional gait pattern d/t B LE swelling and body habitus, however no LOB experienced  Seated rest break taken following 200' of ambulation; HR and SpO2 remained WFL on RA with exertion  Pt demonstrates good motivation to participate and increase functional abilities   Pt will benefit from continued PT intervention during LOS to address current deficits, increase LOF, and facilitate safe d/c home when medically appropriate  D/c recommendation at this time is OP PT  PT Discharge Recommendation: Home with skilled therapy(OP PT)          See flowsheet documentation for full assessment

## 2020-08-25 NOTE — PROCEDURES
Interventional Radiology Procedure Note    PATIENT NAME: Charla Dubin  : 1960  MRN: [id number]     Pre-op Diagnosis:   1  Hyperkalemia    2  Hyponatremia    3  Renal insufficiency    4  Anasarca    5  Ascites    6  Pleural effusion on right      2  Dyspnea    Post-op Diagnosis:   1  Hyperkalemia    2  Hyponatremia    3  Renal insufficiency    4  Anasarca    5  Ascites    6  Pleural effusion on right      2     Same    Procedure: Thoracentesis on the right    Surgeon:   Sheldon Peralta MD  Assistants:     No qualified resident was available, Resident is only observing    Estimated Blood Loss: Minimal    Findings:  Clear yellow fluid    Specimens: sent for labs    Complications:  None     Anesthesia: Local    Sheldon Peralta MD     Date: 2020  Time: 3:39 PM  I

## 2020-08-25 NOTE — PHYSICAL THERAPY NOTE
Physical Therapy Evaluation    Patient Name: Sonja Olmos    KQXIW'[id number] Date: 8/25/2020     Problem List  Principal Problem:    Acute on chronic diastolic congestive heart failure ([facility] )  Active Problems:    Atrial fibrillation (HCC)    Diabetes mellitus (HCC)    Class 3 severe obesity due to excess calories with serious comorbidity and body mass index (BMI) of 45 0 to 49 9 in adult [facility])    Hypertension    STEFF (obstructive sleep apnea)    Pleural effusion on right    SOB (shortness of breath)    Cirrhosis of liver with ascites (HCC)    CKD (chronic kidney disease) stage 3, GFR 30-59 ml/min (HCC)    Hyperkalemia    Tobacco abuse    Chronic anemia    Thrombocytopenia (HCC)       Past Medical History  Past Medical History:   Diagnosis Date    A-fib [facility])     Cardiac disease     Chronic combined systolic (congestive) and diastolic (congestive) heart failure (HCC)     Diabetes mellitus ([facility] )     Dilated cardiomyopathy ([place] [address] )     History of echocardiogram 11/24/2014    EF 0 30 (30%), Likely mod LV systolic dysfunction  Likely RV dysfunction as well   History of Lexiscan MPI 02/19/2016    EF 0 43 (43%), no prior MI or ischemia   Hx of echocardiogram 04/28/2017    Normal EF, Normal LV systolic function  Mild concentric LV hypertrophy  Mild mitral and tricuspid regurg      Hx: recurrent pneumonia     Hypertension     Long term (current) use of anticoagulants     Morbid (severe) obesity due to excess calories (HCC)     Neuropathy         Past Surgical History  Past Surgical History:   Procedure Laterality Date    HERNIA REPAIR      SPLENECTOMY, TOTAL      VASCULAR SURGERY Right     leg           08/25/20 1344   Note Type   Note type Eval/Treat   Pain Assessment   Pain Assessment Tool Pain Assessment not indicated - pt denies pain   Pain Score No Pain   Home Living   Type of Home House   Home Layout Two level;Bed/bath upstairs;Stairs to enter with rails  (4 EZEKIEL c HR)   Bathroom Shower/Tub Tub/shower unit   Bathroom Toilet Standard   Bathroom Accessibility Accessible   Home Equipment Cane   Additional Comments pt reports use of SPC at baseline   Prior Function   Level of [place] Independent with ADLs and functional mobility   Lives With Spouse   ADL Assistance Independent   IADLs Independent   Falls in the last 6 months 0   Vocational Retired   Comments + driving   Restrictions/Precautions   [place] Bearing Precautions Per Order No   Other Precautions Fall Risk   General   Family/Caregiver Present No   Cognition   Overall Cognitive Status WFL   Orientation Level Oriented X4   Following Commands Follows all commands and directions without difficulty   RLE Assessment   RLE Assessment WFL  (tested functionally)   LLE Assessment   LLE Assessment WFL  (tested functionally)   Bed Mobility   Additional Comments pt OOB at start/end of session   Transfers   Sit to Stand 5  Supervision   Stand to Sit 5  Supervision   Additional Comments performed with and without [facility]   Ambulation/Elevation   Gait pattern Excessively slow; Short stride;Decreased foot clearance; Wide NICOLLE; Improper Weight shift   Gait Assistance 5  Supervision   Additional items Verbal cues   Assistive Device [facility]   Distance 200'   Stair Management Assistance Not tested   Balance   Static Sitting Good   Dynamic Sitting Good   Static Standing Fair +   Dynamic Standing Fair +   Ambulatory Fair  (with SPC)   Endurance Deficit   Endurance Deficit Yes   Endurance Deficit Description fatigued with increased exertion   Activity Tolerance   Activity Tolerance Patient limited by fatigue   Assessment   Prognosis Good   Problem List Decreased strength;Decreased endurance; Impaired balance;Decreased mobility;Obesity   Assessment Patient is a 63 y o  male evaluated by Physical Therapy s/p admit to [facility] on 8/21/2020 with admitting diagnosis of: Shortness of breath, Hyperkalemia, Anasarca, Hyponatremia, Renal insufficiency, Ascites, and principal problem of: Acute on chronic diastolic congestive heart failure  PT was consulted to assess patient's functional mobility and discharge needs  Ordered are PT Evaluation and treatment with activity level of: ambulate patient  Comorbidities affecting patient's physical performance at time of assessment include: a-fib, DM, neuropathy, HTN, cardiac disease, obesity, CHF  Personal factors affecting the patient at time of IE include: lives in 2 story home, ambulating with assistive device, step(s) to enter home, h/o non-compliance, inability/difficulty performing IADLs and inability/difficulty performing ADLs  Please locate objective findings from PT assessment regarding body systems outlined above  Upon evaluation, pt able to perform all functional mobility with SUP, SPC, and increased time  Pt provided with occasional verbal cuing for direction  Pt with dysfunctional gait pattern d/t B LE swelling and body habitus, however no LOB experienced  Seated rest break taken following 200' of ambulation; HR and SpO2 remained WFL on RA with exertion  Pt demonstrates good motivation to participate and increase functional abilities  Pt will benefit from continued PT intervention during LOS to address current deficits, increase LOF, and facilitate safe d/c home when medically appropriate  D/c recommendation at this time is OP PT  Goals   Patient Goals to go home   Short Term Goal #1 Pt will participate in B LE strengthening exercises to facilitate improved functional mobility  Short Term Goal #2 Pt will perform all functional transfers and bed mobility mod(I) with good safety awareness  LTG Expiration Date 09/08/20   Long Term Goal #1 Pt will ambulate 450' mod(I) with SPC while maintaining good functional dynamic balance     Long Term Goal #2 Pt will ascend/descend 12 stairs with HR and SUP to reflect the ability to safely access the 2nd floor of his home   Plan Treatment/Interventions Functional transfer training;LE strengthening/ROM; Elevations; Therapeutic exercise; Endurance training;Bed mobility;Gait training   PT Frequency Other (Comment)  (3-5x/week)   Recommendation   PT Discharge Recommendation Home with skilled therapy  (OP PT)     Pt seated in recliner at end of session with all needs in reach

## 2020-08-25 NOTE — SEDATION DOCUMENTATION
Patient had a right sided thoracentesis performed by Dr Halle Cerrato without complications  A total of 400 ml of clear yellowish fluid was removed  Patient offers no complaints at this time

## 2020-08-25 NOTE — PROGRESS NOTES
Progress Note - Marta Goldstein 1960, 63 y o  male MRN: [id number]    Unit/Bed#: 406-01 Encounter: [id number]    Primary Care Provider: Jennifer Feldman DO   Date and time admitted to hospital: 8/21/2020 11:30 AM        * Acute on chronic diastolic congestive heart failure [facility])  Assessment & Plan  Wt Readings from Last 3 Encounters:   08/25/20 (!) 152 kg (334 lb 14 1 oz)   07/23/20 (!) 153 kg (338 lb)   07/13/20 (!) 158 kg (348 lb)     Acute on Chronic CHF  Most recent echo from 01/2020 - 60% EF  Lasix IV TID, weights declining but suspect inaccuracy   Thoracentesis tomorrow      Atrial fibrillation (HCC)  Assessment & Plan  On Coumadin and Coreg 6 25 b i d  INR goal 2-3, continue to monitor  Coumadin dose 2 5 mg daily, except 5 mg on Monday and Thursday  Warfarin on hold for anticipated colonoscopy on Friday, 08/28/2020    Thrombocytopenia ([address] )  Assessment & Plan  In the patient with evidence of liver cirrhosis  Platelet count dropped from borderline at 150 down to 130  Monitor CBC    CKD (chronic kidney disease) stage 3, GFR 30-59 ml/min [facility])  Assessment & Plan  Patient likely has a diagnosis of CKD given his GFR values on record  His presenting creatinine appears near baseline, remained stable while on IV diuretics, continue to monitor BMP      Pleural effusion on right  Assessment & Plan  CTA PE study reveals moderate right pleural effusion  No change with IV Lasix  Thoracentesis today by IR        Progress Note - Marta Goldstein 63 y o  male MRN: [id number]    Unit/Bed#: 406-01 Encounter: [id number]        Subjective:   Patient seen and examined at bedside,feels a little better but still SOB    Objective:     Vitals:   Vitals:    08/25/20 0739   BP: 133/78   Pulse: 78   Resp: 18   Temp: 97 5 °F (36 4 °C)   SpO2: 97%     Body mass index is 46 71 kg/m²      Intake/Output Summary (Last 24 hours) at 8/25/2020 1024  Last data filed at 8/25/2020 0739  Gross per 24 hour   Intake 960 ml   Output 2800 ml Net -1840 ml       Physical Exam:   /78   Pulse 78   Temp 97 5 °F (36 4 °C) (Temporal)   Resp 18   Ht 5' 11" (1 803 m)   Wt (!) 152 kg (334 lb 14 1 oz)   SpO2 97%   BMI 46 71 kg/m²   General appearance: alert and oriented, in no acute distress  Head: Normocephalic, without obvious abnormality, atraumatic  Lungs: clear to auscultation bilaterally  Heart: regular rate and rhythm, S1, S2 normal, no murmur, click, rub or gallop  Abdomen: soft, non-tender; bowel sounds normal; no masses,  no organomegaly  Extremities:  Chronic venous stasis  Pulses: 2+ and symmetric  Neurologic: Grossly normal     Invasive Devices     Peripheral Intravenous Line            Peripheral IV 08/23/20 Left Forearm 1 day                Results from last 7 days   Lab Units 08/25/20  0514 08/24/20  0444 08/23/20  0455   WBC Thousand/uL 6 26 5 49 5 01   HEMOGLOBIN g/dL 9 4* 9 0* 9 5*   HEMATOCRIT % 30 0* 28 4* 30 2*   PLATELETS Thousands/uL 142* 131* 147*       Results from last 7 days   Lab Units 08/25/20  0514 08/24/20  0444 08/23/20  0455 08/22/20  0551   POTASSIUM mmol/L 4 2 4 5 4 8 5 0   CHLORIDE mmol/L 101 103 103 102   CO2 mmol/L 31 29 26 26   BUN mg/dL 23 24 24 25   CREATININE mg/dL 1 68* 1 73* 1 69* 1 65*   CALCIUM mg/dL 8 7 8 6 8 6 8 5   ALK PHOS U/L 122* 121*  --  112   ALT U/L 26 18  --  19   AST U/L 22 24  --  20       Medication Administration - last 24 hours from 08/24/2020 1024 to 08/25/2020 1024       Date/Time Order Dose Route Action Action by     08/25/2020 0801 carvedilol (COREG) tablet 6 25 mg 6 25 mg Oral Given Ame Salguero RN     08/24/2020 1800 carvedilol (COREG) tablet 6 25 mg 6 25 mg Oral Given Ary Galeano RN     08/25/2020 0549 pantoprazole (PROTONIX) EC tablet 40 mg 40 mg Oral Given Tim Cast RN     08/25/2020 0801 digoxin (LANOXIN) tablet 250 mcg 250 mcg Oral Given Ame Salguero RN     08/25/2020 0801 docusate sodium (COLACE) capsule 100 mg 100 mg Oral Given Ame Salguero RN 08/24/2020 1800 docusate sodium (COLACE) capsule 100 mg 100 mg Oral Given Basia Traore, RN     08/25/2020 0746 insulin lispro (HumaLOG) 100 units/mL subcutaneous injection 1-5 Units 1 Units Subcutaneous Not Given Sunny Cantu, RN     08/24/2020 1755 insulin lispro (HumaLOG) 100 units/mL subcutaneous injection 1-5 Units 1 Units Subcutaneous Not Given Basia Traore, RN     08/24/2020 1148 insulin lispro (HumaLOG) 100 units/mL subcutaneous injection 1-5 Units 1 Units Subcutaneous Not Given Basia Traore, RN     08/24/2020 2118 insulin lispro (HumaLOG) 100 units/mL subcutaneous injection 1-5 Units 1 Units Subcutaneous Given Meme Parker, RN     08/25/2020 0549 furosemide (LASIX) injection 40 mg 40 mg Intravenous Given Meme Parker, JULIA     08/24/2020 1800 furosemide (LASIX) injection 40 mg 40 mg Intravenous Given Basia Traore RN     08/24/2020 1136 furosemide (LASIX) injection 40 mg 40 mg Intravenous Given Basia Traore RN            Lab, Imaging and other studies: I have personally reviewed pertinent reports      VTE Pharmacologic Prophylaxis:  Warfarin on hold  VTE Mechanical Prophylaxis: sequential compression device     Maria Fernanda Norwood MD  8/25/2020,10:24 AM

## 2020-08-25 NOTE — ASSESSMENT & PLAN NOTE
CTA PE study reveals moderate right pleural effusion    No change with IV Lasix  Thoracentesis today by IR

## 2020-08-25 NOTE — PLAN OF CARE
Problem: OCCUPATIONAL THERAPY ADULT  Goal: Performs self-care activities at highest level of function for planned discharge setting  See evaluation for individualized goals  Description: Treatment Interventions: ADL retraining, Functional transfer training, UE strengthening/ROM, Endurance training, Patient/family training, Activityengagement          See flowsheet documentation for full assessment, interventions and recommendations  Note: Limitation: Decreased ADL status, Decreased UE strength, Decreased Safe judgement during ADL, Decreased endurance, Decreased self-care trans, Decreased high-level ADLs     Assessment: Pt is a 63 y o  male seen for OT evaluation s/p admit to [facility] on 8/21/2020 w/ Acute on chronic diastolic congestive heart failure ([address] )  Comorbidities affecting pt's functional performance at time of assessment include: DM, HTN, obesity, CHF, neuropathy and afib, cardiac disease, cardiomyopathy  Personal factors affecting pt at time of IE include:steps to enter environment, behavioral pattern, difficulty performing ADLS, difficulty performing IADLS , limited insight into deficits, compliance, decreased initiation and engagement  and health management   Prior to admission, pt was (I) with ADLs and (A) with IADLs with SPC during functional mobility  Upon evaluation: Pt requires (S) level with use of SPC during functional mobility 2* the following deficits impacting occupational performance: weakness, decreased strength, decreased balance, decreased tolerance, impaired initiation, decreased safety awareness and impaired interpersonal skills  Pt to benefit from continued skilled OT tx while in the hospital to address deficits as defined above and maximize level of functional independence w ADL's and functional mobility  Occupational Performance areas to address include: grooming, bathing/shower, toilet hygiene, dressing, functional mobility, community mobility and clothing management   From OT standpoint, recommendation at time of d/c would be home with home OP PT       OT Discharge Recommendation: Home with skilled therapy(OP PT)

## 2020-08-25 NOTE — DISCHARGE INSTRUCTIONS
[facility] [address]  Interventional Radiology  Dr [address]: [phone] (M-F 7:30am - 4:00pm)  Off hours or no answer: [phone] (Ask for IR on call)        Thoracentesis   WHAT YOU NEED TO KNOW:   A thoracentesis is a procedure to remove extra fluid or air from between your lungs and your inner chest wall  Air or fluid buildup may make it hard for you to breathe  A thoracentesis allows your lungs to expand fully so you can breathe more easily  DISCHARGE INSTRUCTIONS:     Small amount of shoulder pain and bloody sputum is normal after a Thoracentesis  Rest:  Rest when you feel it is needed  Slowly start to do more each day  Return to your daily activities as directed  Resume your normal diet  Small sips of flat soda will help mild nausea  Do not smoke: If you smoke, it is never too late to quit  Ask for information about how to stop smoking if you need help  Contact Interventional Radiology at [phone] Richard PATIENTS: Contact Interventional Radiology at [phone]) Jonathan Callahan PATIENTS: Contact Interventional Radiology at [phone]) if:   · You have a fever  · Your puncture site is red, warm, swollen, or draining pus  · You have questions or concerns about your procedure, medicine, or care  Seek care immediately or call [phone] if:   · Severe chest pain with inspiration and shortness of breath    · Large amounts of blood in your sputum    Follow up with your healthcare provider as directed

## 2020-08-25 NOTE — PROGRESS NOTES
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   2  Consult IR for right sided thoracentesis   3  Check LDH, pH, protein, glucose, cell count with diff, gram stain and culture, cytology   4  Check serum LDH and protein   5  Repeat CXR after procedure to evaluate for ex vacuo pneumothorax   2  Acute on chronic diastolic CHF  1  Suspect acute exacerbation given significant response to IV diuretics while inpatient   2  Continue diuretics per primary team-currently Lasix 40 mg IV t i d   3  Monitor I/Os and daily weights  4  Continue low-sodium diet and fluid restriction  3  STEFF  1  Severe STEFF based on diagnostic polysomnogram 05/01/2020  2  Plan for CPAP titration study as outpatient  4  Nicotine dependence with suspected underlying COPD  1  Not in acute exacerbation-need for systemic steroids  2  Giving clinical improvement with bronchodilators may be discharged home on rescue inhaler  3  Recommend outpatient pulmonary follow-up and PFTs  5  Atrial fibrillation   1  Rate controlled currently  2  Medical management per primary team  3  Continue hold anticoagulation in setting planned procedures later this week  6  Cirrhosis  1  Treatment per primary team   2  Planning outpatient workup with GI  7  Morbid obesity   1  Weight loss encouraged   8  Pulmonary hypertension  1  Multifactorial   2  Treatment underlying cause   3  Monitor SpO2 closely    Chief Complaint:    "My breathing is fine "    Subjective:    Patient was seen examined today  No overnight events reported  Patient is seen sitting in recliner comfortably on room air in no acute distress  He denies shortness of breath at rest dyspnea on exertion currently  He endorses chronic dyspnea on exertion when climbing stairs which is been normal for him    Denies cough, sputum production, hemoptysis, wheeze, chest tightness  No fevers or chills  Denies nausea, vomiting, abdominal pain  He reports that his leg swelling is also chronic for him  Objective:    Vitals: Blood pressure 133/78, pulse 78, temperature 97 5 °F (36 4 °C), temperature source Temporal, resp  rate 18, height 5' 11" (1 803 m), weight (!) 152 kg (334 lb 14 1 oz), SpO2 97 %  room air,Body mass index is 46 71 kg/m²  Intake/Output Summary (Last 24 hours) at 8/25/2020 1010  Last data filed at 8/25/2020 0739  Gross per 24 hour   Intake 960 ml   Output 2800 ml   Net -1840 ml       Invasive Devices     Peripheral Intravenous Line            Peripheral IV 08/23/20 Left Forearm 1 day                Physical Exam:     Physical Exam  Vitals signs reviewed  Constitutional:       General: He is not in acute distress  Appearance: He is obese  He is not ill-appearing  HENT:      Head: Normocephalic and atraumatic  Right Ear: External ear normal       Left Ear: External ear normal       Nose: Nose normal       Mouth/Throat:      Mouth: Mucous membranes are moist       Pharynx: Oropharynx is clear  Eyes:      Extraocular Movements: Extraocular movements intact  Pupils: Pupils are equal, round, and reactive to light  Neck:      Musculoskeletal: Neck supple  No muscular tenderness  Cardiovascular:      Rate and Rhythm: Normal rate  Rhythm irregular  Pulses: Normal pulses  Heart sounds: Normal heart sounds  No murmur  No friction rub  Pulmonary:      Effort: No respiratory distress  Breath sounds: Decreased air movement present  Examination of the right-middle field reveals decreased breath sounds  Examination of the right-lower field reveals decreased breath sounds  Decreased breath sounds present  No wheezing, rhonchi or rales  Abdominal:      Palpations: Abdomen is soft  There is no mass  Tenderness: There is no abdominal tenderness  Hernia: No hernia is present     Musculoskeletal:      Right lower leg: Edema (+2 pitting edema) present  Left lower leg: Edema (+2 pitting edema) present  Skin:     General: Skin is warm and dry  Comments: Venous stasis dermatis of bilateral lower extremities   Neurological:      General: No focal deficit present  Mental Status: He is alert and oriented to person, place, and time  Mental status is at baseline  Psychiatric:         Mood and Affect: Mood normal          Behavior: Behavior normal          Thought Content: Thought content normal          Judgment: Judgment normal          Labs: I have personally reviewed pertinent lab results  , ABG: No results found for: PHART, VAY4ZQX, PO2ART, WHU3PKH, U5UXOPEL, BEART, SOURCE, BNP: No results found for: BNP, CBC:   Lab Results   Component Value Date    WBC 6 26 08/25/2020    HGB 9 4 (L) 08/25/2020    HCT 30 0 (L) 08/25/2020     (H) 08/25/2020     (L) 08/25/2020    MCH 33 3 08/25/2020    MCHC 31 3 (L) 08/25/2020    RDW 14 4 08/25/2020    MPV 11 0 08/25/2020    NRBC 0 08/25/2020   , CMP:   Lab Results   Component Value Date    SODIUM 138 08/25/2020    K 4 2 08/25/2020     08/25/2020    CO2 31 08/25/2020    BUN 23 08/25/2020    CREATININE 1 68 (H) 08/25/2020    CALCIUM 8 7 08/25/2020    AST 22 08/25/2020    ALT 26 08/25/2020    ALKPHOS 122 (H) 08/25/2020    EGFR 43 08/25/2020   , PT/INR:   Lab Results   Component Value Date    INR 1 59 (H) 08/25/2020   , Troponin: No results found for: TROPONINI      Imaging and other studies: none to review today

## 2020-08-26 ENCOUNTER — APPOINTMENT (INPATIENT)
Dept: RADIOLOGY | Facility: HOSPITAL | Age: 60
DRG: 291 | End: 2020-08-26
Payer: MEDICARE

## 2020-08-26 ENCOUNTER — TELEPHONE (OUTPATIENT)
Dept: SURGERY | Facility: CLINIC | Age: 60
End: 2020-08-26

## 2020-08-26 LAB
ANION GAP SERPL CALCULATED.3IONS-SCNC: 9 MMOL/L (ref 4–13)
BASOPHILS # BLD AUTO: 0.05 THOUSANDS/ΜL (ref 0–0.1)
BASOPHILS NFR BLD AUTO: 1 % (ref 0–1)
BUN SERPL-MCNC: 24 MG/DL (ref 5–25)
CALCIUM SERPL-MCNC: 8.7 MG/DL (ref 8.3–10.1)
CHLORIDE SERPL-SCNC: 99 MMOL/L (ref 100–108)
CO2 SERPL-SCNC: 29 MMOL/L (ref 21–32)
CREAT SERPL-MCNC: 1.63 MG/DL (ref 0.6–1.3)
EOSINOPHIL # BLD AUTO: 0.18 THOUSAND/ΜL (ref 0–0.61)
EOSINOPHIL NFR BLD AUTO: 3 % (ref 0–6)
ERYTHROCYTE [DISTWIDTH] IN BLOOD BY AUTOMATED COUNT: 14.1 % (ref 11.6–15.1)
GFR SERPL CREATININE-BSD FRML MDRD: 45 ML/MIN/1.73SQ M
GLUCOSE SERPL-MCNC: 105 MG/DL (ref 65–140)
GLUCOSE SERPL-MCNC: 105 MG/DL (ref 65–140)
GLUCOSE SERPL-MCNC: 110 MG/DL (ref 65–140)
GLUCOSE SERPL-MCNC: 114 MG/DL (ref 65–140)
GLUCOSE SERPL-MCNC: 159 MG/DL (ref 65–140)
HCT VFR BLD AUTO: 28.9 % (ref 36.5–49.3)
HGB BLD-MCNC: 9.4 G/DL (ref 12–17)
IMM GRANULOCYTES # BLD AUTO: 0.02 THOUSAND/UL (ref 0–0.2)
IMM GRANULOCYTES NFR BLD AUTO: 0 % (ref 0–2)
LYMPHOCYTES # BLD AUTO: 0.79 THOUSANDS/ΜL (ref 0.6–4.47)
LYMPHOCYTES NFR BLD AUTO: 14 % (ref 14–44)
MCH RBC QN AUTO: 33.9 PG (ref 26.8–34.3)
MCHC RBC AUTO-ENTMCNC: 32.5 G/DL (ref 31.4–37.4)
MCV RBC AUTO: 104 FL (ref 82–98)
MONOCYTES # BLD AUTO: 0.46 THOUSAND/ΜL (ref 0.17–1.22)
MONOCYTES NFR BLD AUTO: 8 % (ref 4–12)
NEUTROPHILS # BLD AUTO: 4.2 THOUSANDS/ΜL (ref 1.85–7.62)
NEUTS SEG NFR BLD AUTO: 74 % (ref 43–75)
NRBC BLD AUTO-RTO: 0 /100 WBCS
PLATELET # BLD AUTO: 123 THOUSANDS/UL (ref 149–390)
PMV BLD AUTO: 10.7 FL (ref 8.9–12.7)
POTASSIUM SERPL-SCNC: 4 MMOL/L (ref 3.5–5.3)
RBC # BLD AUTO: 2.77 MILLION/UL (ref 3.88–5.62)
SODIUM SERPL-SCNC: 137 MMOL/L (ref 136–145)
WBC # BLD AUTO: 5.7 THOUSAND/UL (ref 4.31–10.16)

## 2020-08-26 PROCEDURE — 99232 SBSQ HOSP IP/OBS MODERATE 35: CPT | Performed by: INTERNAL MEDICINE

## 2020-08-26 PROCEDURE — 99232 SBSQ HOSP IP/OBS MODERATE 35: CPT | Performed by: FAMILY MEDICINE

## 2020-08-26 PROCEDURE — 71045 X-RAY EXAM CHEST 1 VIEW: CPT

## 2020-08-26 PROCEDURE — 80048 BASIC METABOLIC PNL TOTAL CA: CPT | Performed by: FAMILY MEDICINE

## 2020-08-26 PROCEDURE — 85025 COMPLETE CBC W/AUTO DIFF WBC: CPT | Performed by: FAMILY MEDICINE

## 2020-08-26 PROCEDURE — 97110 THERAPEUTIC EXERCISES: CPT

## 2020-08-26 PROCEDURE — 82948 REAGENT STRIP/BLOOD GLUCOSE: CPT

## 2020-08-26 PROCEDURE — 97530 THERAPEUTIC ACTIVITIES: CPT

## 2020-08-26 PROCEDURE — 97116 GAIT TRAINING THERAPY: CPT

## 2020-08-26 RX ORDER — WARFARIN SODIUM 10 MG/1
10 TABLET ORAL
Status: COMPLETED | OUTPATIENT
Start: 2020-08-26 | End: 2020-08-26

## 2020-08-26 RX ADMIN — PANTOPRAZOLE SODIUM 40 MG: 40 TABLET, DELAYED RELEASE ORAL at 05:41

## 2020-08-26 RX ADMIN — DIGOXIN 250 MCG: 250 TABLET ORAL at 09:36

## 2020-08-26 RX ADMIN — DOCUSATE SODIUM 100 MG: 100 CAPSULE, LIQUID FILLED ORAL at 09:36

## 2020-08-26 RX ADMIN — FUROSEMIDE 40 MG: 10 INJECTION, SOLUTION INTRAMUSCULAR; INTRAVENOUS at 05:41

## 2020-08-26 RX ADMIN — INSULIN LISPRO 1 UNITS: 100 INJECTION, SOLUTION INTRAVENOUS; SUBCUTANEOUS at 22:14

## 2020-08-26 RX ADMIN — FUROSEMIDE 40 MG: 10 INJECTION, SOLUTION INTRAMUSCULAR; INTRAVENOUS at 12:58

## 2020-08-26 RX ADMIN — DOCUSATE SODIUM 100 MG: 100 CAPSULE, LIQUID FILLED ORAL at 17:44

## 2020-08-26 RX ADMIN — CARVEDILOL 6.25 MG: 3.12 TABLET, FILM COATED ORAL at 07:39

## 2020-08-26 RX ADMIN — WARFARIN SODIUM 10 MG: 10 TABLET ORAL at 17:44

## 2020-08-26 RX ADMIN — FUROSEMIDE 40 MG: 10 INJECTION, SOLUTION INTRAMUSCULAR; INTRAVENOUS at 17:43

## 2020-08-26 RX ADMIN — CARVEDILOL 6.25 MG: 3.12 TABLET, FILM COATED ORAL at 17:30

## 2020-08-26 NOTE — OCCUPATIONAL THERAPY NOTE
[address] Progress Note     Patient Name: Steve Blackman  YFJEC'I Date: 8/26/2020  Problem List  Principal Problem:    Acute on chronic diastolic congestive heart failure ([facility] [address] )  Active Problems:    Atrial fibrillation (HCC)    Diabetes mellitus ([facility] [address] )    Class 3 severe obesity due to excess calories with serious comorbidity and body mass index (BMI) of 45 0 to 49 9 in adult [facility])    Hypertension    STEFF (obstructive sleep apnea)    Pleural effusion on right    SOB (shortness of breath)    Cirrhosis of liver with ascites (HCC)    CKD (chronic kidney disease) stage 3, GFR 30-59 ml/min (HCC)    Hyperkalemia    Tobacco abuse    Chronic anemia    Thrombocytopenia (HCC)          08/26/20 1150   Restrictions/Precautions   Weight Bearing Precautions Per Order No   Other Precautions Fall Risk   Pain Assessment   Pain Assessment Tool 0-10   Pain Score No Pain   ADL   Toileting Assistance  6  Modified independent   Transfers   Sit to Stand 5  Supervision   Stand to Sit 5  Supervision   Toilet transfer 5  Supervision   Additional Comments [facility]   Functional Mobility   Functional Mobility 5  Supervision   Additional Comments Patient completed standing balance/functional mobility with SPC and SUP with no LOB throughout   Additional items [facility]   Toilet Transfers   Toilet Transfer From [employer] Type To and from   Toilet Transfer to [employer] Transfers Supervision   Therapeutic Exercise - ROM   UE-ROM Yes   ROM- Right Upper Extremities   R Shoulder AROM; Flexion;ABduction; Extension;Horizontal ABduction; External rotation; Internal rotation   R Elbow AROM;Elbow flexion;Elbow extension   R Weight/Reps/Sets 1x15   ROM - Left Upper Extremities    L Shoulder AROM; Flexion;ABduction; Extension;Horizontal ABduction; External rotation; Internal rotation   L Elbow AROM;Elbow flexion;Elbow extension   L Weight/Reps/Sets 1x15   Cognition   Overall Cognitive Status [facility] Arousal/Participation Alert; Responsive; Cooperative   Attention Within functional limits   Orientation Level Oriented X4   Memory Within functional limits   Following Commands Follows all commands and directions without difficulty   Activity Tolerance   Activity Tolerance Patient tolerated treatment well   Assessment   Assessment Patient in agreement to participate in therapy at this time  Patient reporting no pain throughout treatment  Sit to stand txfrs from latha chair with SUP, standing balance/functional mobility to and from restroom with SPC and SUP and no LOB, toileting MI, Standing balance/functional mobility around hallways with SPC and SUP and no LOB throughout, to increase posture, dynamic standing balance, balance during self cares  Patient educated on home exercise program for BUE with patient completing AROM 1x15 all planes, to increase ROM, txfrs, self cares  Patient reporting understanding of all exercises  Discussed with OT patients current status, continue plan of care progressing as tolerated  Plan   Goal Expiration Date 09/08/20   OT Treatment Day 1   OT Frequency 3-5x/wk   Recommendation   OT Discharge Recommendation Home with skilled therapy     Patient left in latha chair with all needs in reach

## 2020-08-26 NOTE — PROGRESS NOTES
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   2  Consulted IR for right sided thoracentesis yesterday- yielded 400 cc exudate fluid based on LDH, no bacteria, cytology pending   3  Repeat CXR pending after procedure to evaluate for ex vacuo pneumothorax   2  Acute on chronic diastolic CHF  1  Suspect acute exacerbation given significant response to IV diuretics while inpatient   2  Continue diuretics per primary team-currently Lasix 40 mg IV TID  3  Monitor I/Os and daily weights  4  Continue low-sodium diet and fluid restriction  3  STEFF  1  Severe STEFF based on diagnostic polysomnogram 05/01/2020  2  Plan for CPAP titration study as outpatient  4  Nicotine dependence with suspected underlying COPD  1  Not in acute exacerbation-no need for systemic steroids  2  Giving clinical improvement with bronchodilators may be discharged home on rescue inhaler  3  Recommend outpatient pulmonary follow-up and PFTs  5  Atrial fibrillation   1  Rate controlled currently  2  Medical management per primary team  3  Continue hold anticoagulation in setting planned procedures later this week  6  Cirrhosis  1  Treatment per primary team   2  Planning outpatient workup with GI  7  Morbid obesity   1  Weight loss encouraged   8  Pulmonary hypertension  1  Multifactorial   2  Treatment underlying cause   3  Monitor SpO2 closely    Chief Complaint:    "I feel fine "    Subjective:    Patient was seen examined today  No overnight events reported  Patient had thoracentesis yesterday which she states he tolerated very well  He states I have had dental procedures that hurt worse    Denies shortness of breath at rest, dyspnea on exertion wheeze, chest pains, cough, sputum production, hemoptysis currently  No fevers or chills  Denies nausea, vomiting, abdominal pain  Reports that his leg swelling seems improved since admission  Objective:    Vitals: Blood pressure 134/78, pulse 58, temperature 98 5 °F (36 9 °C), resp  rate 18, height 5' 11" (1 803 m), weight (!) 149 kg (327 lb 6 1 oz), SpO2 97 %  room air,Body mass index is 45 66 kg/m²  Intake/Output Summary (Last 24 hours) at 8/26/2020 1013  Last data filed at 8/26/2020 0918  Gross per 24 hour   Intake 460 ml   Output 4700 ml   Net -4240 ml       Invasive Devices     Peripheral Intravenous Line            Peripheral IV 08/23/20 Left Forearm 2 days                Physical Exam:     Physical Exam  Vitals signs reviewed  Constitutional:       General: He is not in acute distress  Appearance: He is obese  He is not ill-appearing  HENT:      Head: Normocephalic and atraumatic  Right Ear: External ear normal       Left Ear: External ear normal       Nose: Nose normal       Mouth/Throat:      Mouth: Mucous membranes are moist       Pharynx: Oropharynx is clear  Eyes:      Extraocular Movements: Extraocular movements intact  Conjunctiva/sclera: Conjunctivae normal       Pupils: Pupils are equal, round, and reactive to light  Neck:      Musculoskeletal: Normal range of motion and neck supple  No muscular tenderness  Cardiovascular:      Rate and Rhythm: Normal rate and regular rhythm  Pulses: Normal pulses  Heart sounds: Normal heart sounds  Pulmonary:      Effort: Pulmonary effort is normal       Breath sounds: Normal breath sounds  No wheezing, rhonchi or rales  Abdominal:      Palpations: Abdomen is soft  There is no mass  Tenderness: There is no abdominal tenderness  Hernia: No hernia is present  Musculoskeletal: Normal range of motion  General: Swelling present  Right lower leg: Edema present  Left lower leg: Edema present  Skin:     General: Skin is warm and dry        Comments: Chronic venous stasis dermatitis   Neurological:      General: No focal deficit present  Mental Status: He is alert and oriented to person, place, and time  Mental status is at baseline  Psychiatric:         Mood and Affect: Mood normal          Behavior: Behavior normal          Thought Content: Thought content normal          Judgment: Judgment normal          Labs: I have personally reviewed pertinent lab results  , ABG: No results found for: PHART, ITA5PIC, PO2ART, OAQ9YDN, E4GGQMYN, BEART, SOURCE, BNP: No results found for: BNP, CBC:   Lab Results   Component Value Date    WBC 5 70 08/26/2020    HGB 9 4 (L) 08/26/2020    HCT 28 9 (L) 08/26/2020     (H) 08/26/2020     (L) 08/26/2020    MCH 33 9 08/26/2020    MCHC 32 5 08/26/2020    RDW 14 1 08/26/2020    MPV 10 7 08/26/2020    NRBC 0 08/26/2020   , CMP:   Lab Results   Component Value Date    SODIUM 137 08/26/2020    K 4 0 08/26/2020    CL 99 (L) 08/26/2020    CO2 29 08/26/2020    BUN 24 08/26/2020    CREATININE 1 63 (H) 08/26/2020    CALCIUM 8 7 08/26/2020    EGFR 45 08/26/2020   , PT/INR: No results found for: PT, INR, Troponin: No results found for: TROPONINI      Imaging and other studies: CXR pending

## 2020-08-26 NOTE — PLAN OF CARE
Problem: OCCUPATIONAL THERAPY ADULT  Goal: Performs self-care activities at highest level of function for planned discharge setting  See evaluation for individualized goals  Description: Treatment Interventions: ADL retraining, Functional transfer training, UE strengthening/ROM, Endurance training, Patient/family training, Activityengagement          See flowsheet documentation for full assessment, interventions and recommendations  Outcome: Progressing  Note: Limitation: Decreased ADL status, Decreased UE strength, Decreased Safe judgement during ADL, Decreased endurance, Decreased self-care trans, Decreased high-level ADLs     Assessment: Patient in agreement to participate in therapy at this time  Patient reporting no pain throughout treatment  Sit to stand txfrs from latha chair with SUP, standing balance/functional mobility to and from restroom with SPC and SUP and no LOB, toileting MI, Standing balance/functional mobility around hallways with SPC and SUP and no LOB throughout, to increase posture, dynamic standing balance, balance during self cares  Patient educated on home exercise program for BUE with patient completing AROM 1x15 all planes, to increase ROM, txfrs, self cares  Patient reporting understanding of all exercises  Discussed with OT patients current status, continue plan of care progressing as tolerated       OT Discharge Recommendation: Home with skilled therapy

## 2020-08-26 NOTE — ASSESSMENT & PLAN NOTE
Patient likely has a diagnosis of CKD given his GFR values on record    His presenting creatinine appears near baseline    Renal function stable with diuresis

## 2020-08-26 NOTE — ASSESSMENT & PLAN NOTE
On Coumadin and Coreg 6 25 b i d  INR goal 2-3, continue to monitor  Coumadin dose 2 5 mg daily, except 5 mg on Monday and Thursday  Warfarin on hold for anticipated colonoscopy on Friday, 08/28/2020  I don't suspect patient will be able to undergo colonoscopy, will reach out to [facility] to have them reschedule and for now will resume coumadin

## 2020-08-26 NOTE — ASSESSMENT & PLAN NOTE
CTA PE study reveals moderate right pleural effusion    No change with IV Lasix  Thoracentesis today by IR 8/25/20

## 2020-08-26 NOTE — PHYSICAL THERAPY NOTE
PHYSICAL THERAPY NOTE          Patient Name: Natividad PHILIPPE Date: 8/26/2020 08/26/20 0920   Restrictions/Precautions   Other Precautions Fall Risk   Cognition   Overall Cognitive Status WFL   Arousal/Participation Alert; Cooperative   Following Commands Follows all commands and directions without difficulty   Subjective   Subjective My R hip always hurts, I just deal with it  Transfers   Sit to Stand 5  Supervision   Stand to Sit 5  Supervision   Ambulation/Elevation   Gait pattern Improper Weight shift  (Excessively slow; Short stride;Decreased foot clearance; Wide )   Gait Assistance 5  Supervision   Additional items Assist x 1   Assistive Device Straight cane   Distance 200'   Stair Management Assistance 5  Supervision   Additional items Assist x 1;Verbal cues   Stair Management Technique One rail L   Number of Stairs 4   Balance   Static Sitting Good   Dynamic Sitting Good   Static Standing Fair +   Dynamic Standing Fair +   Ambulatory Fair +   Endurance Deficit   Endurance Deficit Yes   Activity Tolerance   Activity Tolerance Patient limited by fatigue   Assessment   Prognosis Good   Problem List Decreased strength;Decreased endurance; Impaired balance;Decreased mobility;Obesity   Assessment Pt  Continues to perform tx/ambulation with supervision utilizing [facility] with no LOB  SpO2 WFL's with mild SOB RA  Able to negotiate 4 steps  Limited by R hip pain  Compared to last session, mobility is improving  Pt will benefit from continued PT intervention during LOS to address current deficits, increase LOF, and facilitate safe d/c home when medically appropriate  D/c recommendation at this time is OP PT   Goals   LTG Expiration Date 09/08/20   PT Treatment Day 1   Plan   Treatment/Interventions Functional transfer training;LE strengthening/ROM; Elevations; Therapeutic exercise; Endurance training;Gait training   Progress Progressing toward goals   Recommendation   PT Discharge Recommendation Home with skilled therapy  (OP PT)   Pt  OOB in chair  with call bell within reach at end of PT session  Discussed with Yordy Fragoso, PT today's treatment and patient's current level of function for care coordination

## 2020-08-26 NOTE — ASSESSMENT & PLAN NOTE
Wt Readings from Last 3 Encounters:   08/26/20 (!) 149 kg (327 lb 6 1 oz)   07/23/20 (!) 153 kg (338 lb)   07/13/20 (!) 158 kg (348 lb)       Most recent echo from 01/2020 - 60% EF  Lasix IV TID, weights declining but suspect inaccuracy   Thoracentesis completed 8/25/20

## 2020-08-26 NOTE — PROGRESS NOTES
Progress Note - Carry Verna 1960, 63 y o  male MRN: [id number]    Unit/Bed#: 406-01 Encounter: [id number]    Primary Care Provider: Nery Espitia DO   Date and time admitted to hospital: 8/21/2020 11:30 AM        * Acute on chronic diastolic congestive heart failure [facility])  Assessment & Plan  Wt Readings from Last 3 Encounters:   08/26/20 (!) 149 kg (327 lb 6 1 oz)   07/23/20 (!) 153 kg (338 lb)   07/13/20 (!) 158 kg (348 lb)       Most recent echo from 01/2020 - 60% EF  Lasix IV TID, weights declining but suspect inaccuracy   Thoracentesis completed 8/25/20      Atrial fibrillation (HCC)  Assessment & Plan  On Coumadin and Coreg 6 25 b i d  INR goal 2-3, continue to monitor  Coumadin dose 2 5 mg daily, except 5 mg on Monday and Thursday  Warfarin on hold for anticipated colonoscopy on Friday, 08/28/2020  I don't suspect patient will be able to undergo colonoscopy, will reach out to [facility] to have them reschedule and for now will resume coumadin  Chronic anemia  Assessment & Plan  Baseline hemoglobin around 10  Hemoglobin remains at baseline, continue to monitor    CKD (chronic kidney disease) stage 3, GFR 30-59 ml/min [facility])  Assessment & Plan  Patient likely has a diagnosis of CKD given his GFR values on record  His presenting creatinine appears near baseline    Renal function stable with diuresis    Pleural effusion on right  Assessment & Plan  CTA PE study reveals moderate right pleural effusion  No change with IV Lasix  Thoracentesis today by IR 8/25/20        Progress Note - Carry Verna 63 y o  male MRN: [id number]    Unit/Bed#: 406-01 Encounter: [id number]        Subjective:   Patient seen examined at bedside, continues to feel better, ambulating well    Objective:     Vitals:   Vitals:    08/26/20 0733   BP: 134/78   Pulse: 58   Resp: 18   Temp: 98 5 °F (36 9 °C)   SpO2: 97%     Body mass index is 45 66 kg/m²      Intake/Output Summary (Last 24 hours) at 8/26/2020 1030  Last data filed at 8/26/2020 0918  Gross per 24 hour   Intake 460 ml   Output 4700 ml   Net -4240 ml       Physical Exam:   /78   Pulse 58   Temp 98 5 °F (36 9 °C)   Resp 18   Ht 5' 11" (1 803 m)   Wt (!) 149 kg (327 lb 6 1 oz)   SpO2 97%   BMI 45 66 kg/m²   General appearance: alert and oriented, in no acute distress  Head: Normocephalic, without obvious abnormality, atraumatic  Lungs:  Diminished at the bases, otherwise no overt wheezes rales or rhonchi  Heart: regular rate and rhythm, S1, S2 normal, no murmur, click, rub or gallop  Abdomen: soft, non-tender; bowel sounds normal; no masses,  no organomegaly  Extremities: extremities normal, warm and well-perfused; no cyanosis, clubbing, or edema  Pulses: 2+ and symmetric  Neurologic: Grossly normal     Invasive Devices     Peripheral Intravenous Line            Peripheral IV 08/23/20 Left Forearm 2 days                Results from last 7 days   Lab Units 08/26/20  0549 08/25/20  0514 08/24/20  0444   WBC Thousand/uL 5 70 6 26 5 49   HEMOGLOBIN g/dL 9 4* 9 4* 9 0*   HEMATOCRIT % 28 9* 30 0* 28 4*   PLATELETS Thousands/uL 123* 142* 131*       Results from last 7 days   Lab Units 08/26/20  0549 08/25/20  0514 08/24/20  0444  08/22/20  0551   POTASSIUM mmol/L 4 0 4 2 4 5   < > 5 0   CHLORIDE mmol/L 99* 101 103   < > 102   CO2 mmol/L 29 31 29   < > 26   BUN mg/dL 24 23 24   < > 25   CREATININE mg/dL 1 63* 1 68* 1 73*   < > 1 65*   CALCIUM mg/dL 8 7 8 7 8 6   < > 8 5   ALK PHOS U/L  --  122* 121*  --  112   ALT U/L  --  26 18  --  19   AST U/L  --  22 24  --  20    < > = values in this interval not displayed         Medication Administration - last 24 hours from 08/25/2020 1030 to 08/26/2020 1030       Date/Time Order Dose Route Action Action by     08/26/2020 0717 carvedilol (COREG) tablet 6 25 mg 6 25 mg Oral Given Rafy Zamarripa RN     08/25/2020 1727 carvedilol (COREG) tablet 6 25 mg 6 25 mg Oral Given Sunny Cantu RN     08/26/2020 0522 pantoprazole (PROTONIX) EC tablet 40 mg 40 mg Oral Given Tavo Harrison RN     08/26/2020 0936 digoxin (LANOXIN) tablet 250 mcg 250 mcg Oral Given Lesvia Jc RN     08/26/2020 1018 docusate sodium (COLACE) capsule 100 mg 100 mg Oral Given Lesvia Jc RN     08/25/2020 1724 docusate sodium (COLACE) capsule 100 mg 100 mg Oral Given Rolanda Fan RN     08/26/2020 0747 insulin lispro (HumaLOG) 100 units/mL subcutaneous injection 1-5 Units 1 Units Subcutaneous Not Given Lesvia Jc RN     08/25/2020 1606 insulin lispro (HumaLOG) 100 units/mL subcutaneous injection 1-5 Units 1 Units Subcutaneous Not Given Rolanda Fan RN     08/25/2020 1220 insulin lispro (HumaLOG) 100 units/mL subcutaneous injection 1-5 Units 1 Units Subcutaneous Not Given Rolanda Fan RN     08/25/2020 2102 insulin lispro (HumaLOG) 100 units/mL subcutaneous injection 1-5 Units 1 Units Subcutaneous Not Given Tavo Harrison RN     08/26/2020 0541 furosemide (LASIX) injection 40 mg 40 mg Intravenous Given Tavo Harrison RN     08/25/2020 1724 furosemide (LASIX) injection 40 mg 40 mg Intravenous Given Rolanda Fan RN     08/25/2020 1248 furosemide (LASIX) injection 40 mg 40 mg Intravenous Given Rolanda Fan RN     08/25/2020 1519 lidocaine (PF) (XYLOCAINE-MPF) 1 % injection 10 mL Infiltration Given Alycia Seo MD            Lab, Imaging and other studies: I have personally reviewed pertinent reports      VTE Pharmacologic Prophylaxis:  Resume warfarin  VTE Mechanical Prophylaxis: sequential compression device     Owen Keyes MD  8/26/2020,10:30 AM

## 2020-08-27 ENCOUNTER — TELEPHONE (OUTPATIENT)
Dept: NEPHROLOGY | Facility: CLINIC | Age: 60
End: 2020-08-27

## 2020-08-27 ENCOUNTER — TRANSITIONAL CARE MANAGEMENT (OUTPATIENT)
Dept: FAMILY MEDICINE CLINIC | Facility: CLINIC | Age: 60
End: 2020-08-27

## 2020-08-27 VITALS
WEIGHT: 315 LBS | RESPIRATION RATE: 18 BRPM | TEMPERATURE: 98.6 F | HEIGHT: 71 IN | SYSTOLIC BLOOD PRESSURE: 131 MMHG | BODY MASS INDEX: 44.1 KG/M2 | DIASTOLIC BLOOD PRESSURE: 77 MMHG | OXYGEN SATURATION: 95 % | HEART RATE: 75 BPM

## 2020-08-27 LAB
ANION GAP SERPL CALCULATED.3IONS-SCNC: 4 MMOL/L (ref 4–13)
BASOPHILS # BLD AUTO: 0.03 THOUSANDS/ΜL (ref 0–0.1)
BASOPHILS NFR BLD AUTO: 1 % (ref 0–1)
BUN SERPL-MCNC: 24 MG/DL (ref 5–25)
CALCIUM SERPL-MCNC: 8.6 MG/DL (ref 8.3–10.1)
CHLORIDE SERPL-SCNC: 99 MMOL/L (ref 100–108)
CO2 SERPL-SCNC: 34 MMOL/L (ref 21–32)
CREAT SERPL-MCNC: 1.72 MG/DL (ref 0.6–1.3)
EOSINOPHIL # BLD AUTO: 0.18 THOUSAND/ΜL (ref 0–0.61)
EOSINOPHIL NFR BLD AUTO: 3 % (ref 0–6)
ERYTHROCYTE [DISTWIDTH] IN BLOOD BY AUTOMATED COUNT: 13.9 % (ref 11.6–15.1)
GFR SERPL CREATININE-BSD FRML MDRD: 42 ML/MIN/1.73SQ M
GLUCOSE SERPL-MCNC: 115 MG/DL (ref 65–140)
GLUCOSE SERPL-MCNC: 118 MG/DL (ref 65–140)
HCT VFR BLD AUTO: 29 % (ref 36.5–49.3)
HGB BLD-MCNC: 9.3 G/DL (ref 12–17)
IMM GRANULOCYTES # BLD AUTO: 0.02 THOUSAND/UL (ref 0–0.2)
IMM GRANULOCYTES NFR BLD AUTO: 0 % (ref 0–2)
INR PPP: 1.41 (ref 0.84–1.19)
LYMPHOCYTES # BLD AUTO: 0.74 THOUSANDS/ΜL (ref 0.6–4.47)
LYMPHOCYTES NFR BLD AUTO: 13 % (ref 14–44)
MCH RBC QN AUTO: 33.5 PG (ref 26.8–34.3)
MCHC RBC AUTO-ENTMCNC: 32.1 G/DL (ref 31.4–37.4)
MCV RBC AUTO: 104 FL (ref 82–98)
MONOCYTES # BLD AUTO: 0.48 THOUSAND/ΜL (ref 0.17–1.22)
MONOCYTES NFR BLD AUTO: 9 % (ref 4–12)
NEUTROPHILS # BLD AUTO: 4.07 THOUSANDS/ΜL (ref 1.85–7.62)
NEUTS SEG NFR BLD AUTO: 74 % (ref 43–75)
NRBC BLD AUTO-RTO: 0 /100 WBCS
PLATELET # BLD AUTO: 123 THOUSANDS/UL (ref 149–390)
PMV BLD AUTO: 11.1 FL (ref 8.9–12.7)
POTASSIUM SERPL-SCNC: 3.7 MMOL/L (ref 3.5–5.3)
PROTHROMBIN TIME: 17 SECONDS (ref 11.6–14.5)
RBC # BLD AUTO: 2.78 MILLION/UL (ref 3.88–5.62)
SODIUM SERPL-SCNC: 137 MMOL/L (ref 136–145)
WBC # BLD AUTO: 5.52 THOUSAND/UL (ref 4.31–10.16)

## 2020-08-27 PROCEDURE — 85610 PROTHROMBIN TIME: CPT | Performed by: FAMILY MEDICINE

## 2020-08-27 PROCEDURE — 80048 BASIC METABOLIC PNL TOTAL CA: CPT | Performed by: FAMILY MEDICINE

## 2020-08-27 PROCEDURE — 82948 REAGENT STRIP/BLOOD GLUCOSE: CPT

## 2020-08-27 PROCEDURE — 99239 HOSP IP/OBS DSCHRG MGMT >30: CPT | Performed by: FAMILY MEDICINE

## 2020-08-27 PROCEDURE — 85025 COMPLETE CBC W/AUTO DIFF WBC: CPT | Performed by: FAMILY MEDICINE

## 2020-08-27 RX ADMIN — DOCUSATE SODIUM 100 MG: 100 CAPSULE, LIQUID FILLED ORAL at 08:03

## 2020-08-27 RX ADMIN — ACETAMINOPHEN 650 MG: 325 TABLET, FILM COATED ORAL at 06:13

## 2020-08-27 RX ADMIN — PANTOPRAZOLE SODIUM 40 MG: 40 TABLET, DELAYED RELEASE ORAL at 06:05

## 2020-08-27 RX ADMIN — CARVEDILOL 6.25 MG: 3.12 TABLET, FILM COATED ORAL at 08:02

## 2020-08-27 RX ADMIN — FUROSEMIDE 40 MG: 10 INJECTION, SOLUTION INTRAMUSCULAR; INTRAVENOUS at 06:05

## 2020-08-27 RX ADMIN — DIGOXIN 250 MCG: 250 TABLET ORAL at 08:02

## 2020-08-27 NOTE — SOCIAL WORK
Discussed with patient preferences on discharge;understanding how to manage health at home; purpose of taking medications; importance of follow up care/appointments; and symptoms to watch out for once discharged home  Pt is being dc'd home on this date with OP follow up  Pt has an appt on 10/13 with his PCP Dr Desi Leo CM in basket messaged PCP office to get pt a sooner appt in about 5-7 days  PT also ha a sleep study on 9/23 and an appt with Dr Harrison Mccloud on 10/1; and with Dr Anna Sanderson (Cardiology) on 9/3  CM in [place] messaged Nephrology re: pt needing an appt-office to call pt  Cm also called Otilia Cabello at [address] who will be calling pt tomorrow regarding rescheduling his OP colonoscopy that was supposed to be tomorrow

## 2020-08-27 NOTE — PLAN OF CARE
Problem: Potential for Falls  Goal: Patient will remain free of falls  Description: INTERVENTIONS:  - Assess patient frequently for physical needs  -  Identify cognitive and physical deficits and behaviors that affect risk of falls  -  [place] fall precautions as indicated by assessment   - Educate patient/family on patient safety including physical limitations  - Instruct patient to call for assistance with activity based on assessment  - Modify environment to reduce risk of injury  - Consider OT/PT consult to assist with strengthening/mobility  8/27/2020 0952 by Matt Barrera RN  Outcome: Progressing  8/27/2020 0722 by Matt Barrera RN  Outcome: Progressing     Problem: PAIN - ADULT  Goal: Verbalizes/displays adequate comfort level or baseline comfort level  Description: Interventions:  - Encourage patient to monitor pain and request assistance  - Assess pain using appropriate pain scale  - Administer analgesics based on type and severity of pain and evaluate response  - Implement non-pharmacological measures as appropriate and evaluate response  - Consider cultural and social influences on pain and pain management  - Notify physician/advanced practitioner if interventions unsuccessful or patient reports new pain  8/27/2020 0952 by Matt Barrera RN  Outcome: Progressing  8/27/2020 0722 by Matt Barrera RN  Outcome: Progressing     Problem: SAFETY ADULT  Goal: Patient will remain free of falls  Description: INTERVENTIONS:  - Assess patient frequently for physical needs  -  Identify cognitive and physical deficits and behaviors that affect risk of falls    -  [place] fall precautions as indicated by assessment   - Educate patient/family on patient safety including physical limitations  - Instruct patient to call for assistance with activity based on assessment  - Modify environment to reduce risk of injury  - Consider OT/PT consult to assist with strengthening/mobility  8/27/2020 0952 by [place] Juan Orantes RN  Outcome: Progressing  8/27/2020 0722 by Sari Tapia RN  Outcome: Progressing  Goal: Maintain or return to baseline ADL function  Description: INTERVENTIONS:  -  Assess patient's ability to carry out ADLs; assess patient's baseline for ADL function and identify physical deficits which impact ability to perform ADLs (bathing, care of mouth/teeth, toileting, grooming, dressing, etc )  - Assess/evaluate cause of self-care deficits   - Assess range of motion  - Assess patient's mobility; develop plan if impaired  - Assess patient's need for assistive devices and provide as appropriate  - Encourage maximum independence but intervene and supervise when necessary  - Involve family in performance of ADLs  - Assess for home care needs following discharge   - Consider OT consult to assist with ADL evaluation and planning for discharge  - Provide patient education as appropriate  8/27/2020 0952 by Sari Tapia RN  Outcome: Progressing  8/27/2020 0722 by Sari Tapia RN  Outcome: Progressing  Goal: Maintain or return mobility status to optimal level  Description: INTERVENTIONS:  - Assess patient's baseline mobility status (ambulation, transfers, stairs, etc )    - Identify cognitive and physical deficits and behaviors that affect mobility  - Identify mobility aids required to assist with transfers and/or ambulation (gait belt, sit-to-stand, lift, walker, cane, etc )  - [place] fall precautions as indicated by assessment  - Record patient progress and toleration of activity level on Mobility SBAR; progress patient to next Phase/Stage  - Instruct patient to call for assistance with activity based on assessment  - Consider rehabilitation consult to assist with strengthening/weightbearing, etc   8/27/2020 0952 by Sari Tapia RN  Outcome: Progressing  8/27/2020 0722 by Sari Tapia RN  Outcome: Progressing     Problem: DISCHARGE PLANNING  Goal: Discharge to home or other facility with appropriate resources  Description: INTERVENTIONS:  - Identify barriers to discharge w/patient and caregiver  - Arrange for needed discharge resources and transportation as appropriate  - Identify discharge learning needs (meds, wound care, etc )  - Arrange for interpretive services to assist at discharge as needed  - Refer to Case Management Department for coordinating discharge planning if the patient needs post-hospital services based on physician/advanced practitioner order or complex needs related to functional status, cognitive ability, or social support system  8/27/2020 0952 by Matt Barrera RN  Outcome: Progressing  8/27/2020 0722 by Matt Barrera RN  Outcome: Progressing     Problem: Knowledge Deficit  Goal: Patient/family/caregiver demonstrates understanding of disease process, treatment plan, medications, and discharge instructions  Description: Complete learning assessment and assess knowledge base    Interventions:  - Provide teaching at level of understanding  - Provide teaching via preferred learning methods  8/27/2020 0952 by Matt Barrera RN  Outcome: Progressing  8/27/2020 0722 by Matt Barrera RN  Outcome: Progressing

## 2020-08-27 NOTE — DISCHARGE SUMMARY
Discharge Summary - Dolores Link 63 y o  male MRN: [id number]    Unit/Bed#: 613-23 Encounter: [id number]    Admission Date:   Admission Orders (From admission, onward)     Ordered        08/21/20 1532  Inpatient Admission  Once         08/21/20 1350  Place in Observation (expected length of stay for this patient is less than two midnights)  Once                     Admitting Diagnosis: Shortness of breath [R06 02]  Hyperkalemia [E87 5]  Anasarca [R60 1]  Hyponatremia [E87 1]  Renal insufficiency [N28 9]  Ascites [R18 8]    HPI: Dolores Link is a 63 y o  male who presents with increased abdominal girth and shortness of breath  The patient has multiple chronic comorbidities including AFib on Coumadin, DM type 2, hypertension, severe STEFF, tobacco abuse, CHF, and CKD  The patient presents to the emergency department because he was getting fitted for pants and his abdomen was reportedly 8in larger than it was 2 weeks ago  He has noted abdominal distention as well as exertional dyspnea  He states he can no longer bend down to touch his shoes      He states he has been eating healthy diet and has only noted weight gain  He denies any provoking factors to the weight gain  He denies recent injuries  He does say he had a recent sickness about a week ago when he had chills overnight however this resolved spontaneously      He denies nausea or vomiting  He denies abdominal pain or tenderness  He states he feels as if the skin over his abdomen is tense  He denies cough or wheeze  His lower extremities are chronically swollen  He denies medication changes  He states he has been compliant with his Lasix and other medications  He is an everyday smoker  He is a frequent drinker however not excessively  He is to have a history of alcoholism  No history of liver disease  No history of hepatitis      Upon presentation, he is maintaining saturations on room air    As his D-dimer was elevated a CTA was performed which showed no PE  He does have a moderate right pleural effusion  There is trace ascites and noted abdominal anasarca  He was found to have mild hyperkalemia and received multiple medications for this in the emergency department  Initial troponin negative  Will put on telemetry until potassium normalizes         Procedures Performed:   Orders Placed This Encounter   Procedures   [address] ED ECG Documentation Only       Summary of Hospital Course:     Acute on chronic diastolic congestive heart failure  Chronic kidney disease stage 3    Patient presents emergency room with complaint of shortness of breath and weight gain  On further questioning he stated that he has been dieting and consuming only protein shakes and protein bars in an attempt to lose weight  He was found to be in fulminant heart failure and admitted to the telemetry unit, initiated on IV Lasix 40 mg IV t i d  Low-salt diet, strict intake and output, daily weights, CTA was requested and showed no PE but a large left pleural effusion  Consultation with Pulmonary Medicine was obtained and pleural effusion was drained successfully without residual pneumothorax  Patient was continued on Lasix 40 mg IV t i d  For a total of 4 days, renal function remained stable, he lost approximately 28 lb  Patient's diuretic regimen was not changed suspect he was in heart failure due to dietary indiscretions, will defer to primary cardiology on follow-up  ACE-inhibitor was also held on discharge to renal for function and appropriate blood pressure      Type 2 diabetes mellitus:  Metformin held on discharge due to renal function      Significant Findings, Care, Treatment and Services Provided:     CTA  1   No pulmonary embolism   2   Moderate right pleural effusion with adjacent atelectasis   3   Groundglass nodules in the left lower lobe, infectious versus inflammatory in etiology   4   Cirrhotic morphology   5   Cholelithiasis without cholecystitis   6   Chronic subcapsular splenic collection, consistent with remote trauma   7   Anasarca with trace ascites     Complications: none    Discharge Diagnosis: see above    Resolved Problems  Date Reviewed: 8/24/2020    None          Condition at Discharge: fair         Discharge instructions/Information to patient and family:   See after visit summary for information provided to patient and family  Provisions for Follow-Up Care:  See after visit summary for information related to follow-up care and any pertinent home health orders  PCP: Tiffany Wallace DO    Disposition: Home    Planned Readmission: No      Discharge Statement   I spent 40 minutes discharging the patient  This time was spent on the day of discharge  I had direct contact with the patient on the day of discharge  Additional documentation is required if more than 30 minutes were spent on discharge  Discharge Medications:  See after visit summary for reconciled discharge medications provided to patient and family

## 2020-08-27 NOTE — PLAN OF CARE
Problem: Potential for Falls  Goal: Patient will remain free of falls  Description: INTERVENTIONS:  - Assess patient frequently for physical needs  -  Identify cognitive and physical deficits and behaviors that affect risk of falls  -  [place] fall precautions as indicated by assessment   - Educate patient/family on patient safety including physical limitations  - Instruct patient to call for assistance with activity based on assessment  - Modify environment to reduce risk of injury  - Consider OT/PT consult to assist with strengthening/mobility  Outcome: Progressing     Problem: PAIN - ADULT  Goal: Verbalizes/displays adequate comfort level or baseline comfort level  Description: Interventions:  - Encourage patient to monitor pain and request assistance  - Assess pain using appropriate pain scale  - Administer analgesics based on type and severity of pain and evaluate response  - Implement non-pharmacological measures as appropriate and evaluate response  - Consider cultural and social influences on pain and pain management  - Notify physician/advanced practitioner if interventions unsuccessful or patient reports new pain  Outcome: Progressing     Problem: SAFETY ADULT  Goal: Patient will remain free of falls  Description: INTERVENTIONS:  - Assess patient frequently for physical needs  -  Identify cognitive and physical deficits and behaviors that affect risk of falls    -  [place] fall precautions as indicated by assessment   - Educate patient/family on patient safety including physical limitations  - Instruct patient to call for assistance with activity based on assessment  - Modify environment to reduce risk of injury  - Consider OT/PT consult to assist with strengthening/mobility  Outcome: Progressing  Goal: Maintain or return to baseline ADL function  Description: INTERVENTIONS:  -  Assess patient's ability to carry out ADLs; assess patient's baseline for ADL function and identify physical deficits which impact ability to perform ADLs (bathing, care of mouth/teeth, toileting, grooming, dressing, etc )  - Assess/evaluate cause of self-care deficits   - Assess range of motion  - Assess patient's mobility; develop plan if impaired  - Assess patient's need for assistive devices and provide as appropriate  - Encourage maximum independence but intervene and supervise when necessary  - Involve family in performance of ADLs  - Assess for home care needs following discharge   - Consider OT consult to assist with ADL evaluation and planning for discharge  - Provide patient education as appropriate  Outcome: Progressing  Goal: Maintain or return mobility status to optimal level  Description: INTERVENTIONS:  - Assess patient's baseline mobility status (ambulation, transfers, stairs, etc )    - Identify cognitive and physical deficits and behaviors that affect mobility  - Identify mobility aids required to assist with transfers and/or ambulation (gait belt, sit-to-stand, lift, walker, cane, etc )  - [place] fall precautions as indicated by assessment  - Record patient progress and toleration of activity level on Mobility SBAR; progress patient to next Phase/Stage  - Instruct patient to call for assistance with activity based on assessment  - Consider rehabilitation consult to assist with strengthening/weightbearing, etc   Outcome: Progressing     Problem: DISCHARGE PLANNING  Goal: Discharge to home or other facility with appropriate resources  Description: INTERVENTIONS:  - Identify barriers to discharge w/patient and caregiver  - Arrange for needed discharge resources and transportation as appropriate  - Identify discharge learning needs (meds, wound care, etc )  - Arrange for interpretive services to assist at discharge as needed  - Refer to Case Management Department for coordinating discharge planning if the patient needs post-hospital services based on physician/advanced practitioner order or complex needs related to functional status, cognitive ability, or social support system  Outcome: Progressing     Problem: Knowledge Deficit  Goal: Patient/family/caregiver demonstrates understanding of disease process, treatment plan, medications, and discharge instructions  Description: Complete learning assessment and assess knowledge base    Interventions:  - Provide teaching at level of understanding  - Provide teaching via preferred learning methods  Outcome: Progressing

## 2020-08-27 NOTE — TELEPHONE ENCOUNTER
----- Message from Cheyenne Rubi sent at 8/27/2020  8:41 AM EDT -----  Regarding: hospital follow up appt  Pt is being dc'd home on this date  Has CKD stage III  If you can schedule an appt for pt and call him   Thanks

## 2020-08-27 NOTE — NURSING NOTE
AVS  Printed and gone over with patient  Patient voiced understanding and all questions were answered at time of discharge  Pt left floor via wheelchair accompanied by PCA and spouse in stable condition

## 2020-08-28 DIAGNOSIS — N18.30 CKD (CHRONIC KIDNEY DISEASE) STAGE 3, GFR 30-59 ML/MIN (HCC): Primary | ICD-10-CM

## 2020-08-28 LAB
BACTERIA SPEC BFLD CULT: NO GROWTH
GRAM STN SPEC: NORMAL
GRAM STN SPEC: NORMAL

## 2020-08-31 ENCOUNTER — TELEPHONE (OUTPATIENT)
Dept: GASTROENTEROLOGY | Facility: CLINIC | Age: 60
End: 2020-08-31

## 2020-08-31 ENCOUNTER — PREP FOR PROCEDURE (OUTPATIENT)
Dept: SURGERY | Facility: CLINIC | Age: 60
End: 2020-08-31

## 2020-08-31 DIAGNOSIS — K74.60 CIRRHOSIS OF LIVER WITH ASCITES, UNSPECIFIED HEPATIC CIRRHOSIS TYPE (HCC): ICD-10-CM

## 2020-08-31 DIAGNOSIS — R18.8 CIRRHOSIS OF LIVER WITH ASCITES, UNSPECIFIED HEPATIC CIRRHOSIS TYPE (HCC): ICD-10-CM

## 2020-08-31 DIAGNOSIS — Z11.59 SCREENING FOR VIRAL DISEASE: Primary | ICD-10-CM

## 2020-08-31 NOTE — TELEPHONE ENCOUNTER
Patient called to  reschedule procedure 10/21/20 with Dr Ash April  5 day coumadin hold and diabetic  [place]

## 2020-09-01 ENCOUNTER — APPOINTMENT (OUTPATIENT)
Dept: LAB | Facility: HOSPITAL | Age: 60
End: 2020-09-01
Payer: MEDICARE

## 2020-09-01 ENCOUNTER — OFFICE VISIT (OUTPATIENT)
Dept: FAMILY MEDICINE CLINIC | Facility: CLINIC | Age: 60
End: 2020-09-01
Payer: MEDICARE

## 2020-09-01 VITALS
BODY MASS INDEX: 44.07 KG/M2 | WEIGHT: 315 LBS | DIASTOLIC BLOOD PRESSURE: 72 MMHG | SYSTOLIC BLOOD PRESSURE: 136 MMHG | HEART RATE: 76 BPM | RESPIRATION RATE: 20 BRPM

## 2020-09-01 DIAGNOSIS — I50.33 ACUTE ON CHRONIC DIASTOLIC CONGESTIVE HEART FAILURE (HCC): ICD-10-CM

## 2020-09-01 DIAGNOSIS — R18.8 CIRRHOSIS OF LIVER WITH ASCITES, UNSPECIFIED HEPATIC CIRRHOSIS TYPE (HCC): ICD-10-CM

## 2020-09-01 DIAGNOSIS — Z79.01 LONG TERM (CURRENT) USE OF ANTICOAGULANTS: ICD-10-CM

## 2020-09-01 DIAGNOSIS — I87.2 CHRONIC VENOUS INSUFFICIENCY: ICD-10-CM

## 2020-09-01 DIAGNOSIS — I10 ESSENTIAL HYPERTENSION: ICD-10-CM

## 2020-09-01 DIAGNOSIS — G47.33 OSA (OBSTRUCTIVE SLEEP APNEA): ICD-10-CM

## 2020-09-01 DIAGNOSIS — N18.30 CKD (CHRONIC KIDNEY DISEASE) STAGE 3, GFR 30-59 ML/MIN (HCC): ICD-10-CM

## 2020-09-01 DIAGNOSIS — E66.01 CLASS 3 SEVERE OBESITY DUE TO EXCESS CALORIES WITH SERIOUS COMORBIDITY AND BODY MASS INDEX (BMI) OF 45.0 TO 49.9 IN ADULT (HCC): ICD-10-CM

## 2020-09-01 DIAGNOSIS — K74.60 CIRRHOSIS OF LIVER WITH ASCITES, UNSPECIFIED HEPATIC CIRRHOSIS TYPE (HCC): ICD-10-CM

## 2020-09-01 DIAGNOSIS — I48.21 PERMANENT ATRIAL FIBRILLATION (HCC): Primary | ICD-10-CM

## 2020-09-01 DIAGNOSIS — E11.69 TYPE 2 DIABETES MELLITUS WITH OTHER SPECIFIED COMPLICATION, WITHOUT LONG-TERM CURRENT USE OF INSULIN (HCC): ICD-10-CM

## 2020-09-01 DIAGNOSIS — Z72.0 TOBACCO ABUSE: ICD-10-CM

## 2020-09-01 DIAGNOSIS — I89.0 LYMPHEDEMA: ICD-10-CM

## 2020-09-01 LAB
ALBUMIN SERPL BCP-MCNC: 3.1 G/DL (ref 3.5–5)
ALP SERPL-CCNC: 137 U/L (ref 46–116)
ALT SERPL W P-5'-P-CCNC: 23 U/L (ref 12–78)
ANION GAP SERPL CALCULATED.3IONS-SCNC: 7 MMOL/L (ref 4–13)
AST SERPL W P-5'-P-CCNC: 25 U/L (ref 5–45)
BACTERIA UR QL AUTO: ABNORMAL /HPF
BASOPHILS # BLD AUTO: 0.05 THOUSANDS/ΜL (ref 0–0.1)
BASOPHILS NFR BLD AUTO: 1 % (ref 0–1)
BILIRUB SERPL-MCNC: 0.9 MG/DL (ref 0.2–1)
BILIRUB UR QL STRIP: NEGATIVE
BUN SERPL-MCNC: 23 MG/DL (ref 5–25)
CALCIUM SERPL-MCNC: 8.7 MG/DL (ref 8.3–10.1)
CHLORIDE SERPL-SCNC: 103 MMOL/L (ref 100–108)
CLARITY UR: ABNORMAL
CO2 SERPL-SCNC: 29 MMOL/L (ref 21–32)
COLOR UR: YELLOW
CREAT SERPL-MCNC: 1.65 MG/DL (ref 0.6–1.3)
CREAT UR-MCNC: 113 MG/DL
CREAT UR-MCNC: 113 MG/DL
EOSINOPHIL # BLD AUTO: 0.24 THOUSAND/ΜL (ref 0–0.61)
EOSINOPHIL NFR BLD AUTO: 4 % (ref 0–6)
ERYTHROCYTE [DISTWIDTH] IN BLOOD BY AUTOMATED COUNT: 13.5 % (ref 11.6–15.1)
GFR SERPL CREATININE-BSD FRML MDRD: 44 ML/MIN/1.73SQ M
GLUCOSE P FAST SERPL-MCNC: 122 MG/DL (ref 65–99)
GLUCOSE UR STRIP-MCNC: NEGATIVE MG/DL
HCT VFR BLD AUTO: 30.5 % (ref 36.5–49.3)
HGB BLD-MCNC: 9.7 G/DL (ref 12–17)
HGB UR QL STRIP.AUTO: NEGATIVE
IMM GRANULOCYTES # BLD AUTO: 0.03 THOUSAND/UL (ref 0–0.2)
IMM GRANULOCYTES NFR BLD AUTO: 1 % (ref 0–2)
KETONES UR STRIP-MCNC: NEGATIVE MG/DL
LEUKOCYTE ESTERASE UR QL STRIP: NEGATIVE
LYMPHOCYTES # BLD AUTO: 0.86 THOUSANDS/ΜL (ref 0.6–4.47)
LYMPHOCYTES NFR BLD AUTO: 13 % (ref 14–44)
MCH RBC QN AUTO: 33.6 PG (ref 26.8–34.3)
MCHC RBC AUTO-ENTMCNC: 31.8 G/DL (ref 31.4–37.4)
MCV RBC AUTO: 106 FL (ref 82–98)
MICROALBUMIN UR-MCNC: 20 MG/L (ref 0–20)
MICROALBUMIN/CREAT 24H UR: 18 MG/G CREATININE (ref 0–30)
MONOCYTES # BLD AUTO: 0.44 THOUSAND/ΜL (ref 0.17–1.22)
MONOCYTES NFR BLD AUTO: 7 % (ref 4–12)
NEUTROPHILS # BLD AUTO: 4.87 THOUSANDS/ΜL (ref 1.85–7.62)
NEUTS SEG NFR BLD AUTO: 74 % (ref 43–75)
NITRITE UR QL STRIP: NEGATIVE
NON-SQ EPI CELLS URNS QL MICRO: ABNORMAL /HPF
NRBC BLD AUTO-RTO: 0 /100 WBCS
PH UR STRIP.AUTO: 7.5 [PH]
PLATELET # BLD AUTO: 119 THOUSANDS/UL (ref 149–390)
PMV BLD AUTO: 10.5 FL (ref 8.9–12.7)
POTASSIUM SERPL-SCNC: 4.4 MMOL/L (ref 3.5–5.3)
PROT SERPL-MCNC: 7.8 G/DL (ref 6.4–8.2)
PROT UR STRIP-MCNC: ABNORMAL MG/DL
PROT UR-MCNC: 48 MG/DL
PROT/CREAT UR: 0.42 MG/G{CREAT} (ref 0–0.1)
RBC # BLD AUTO: 2.89 MILLION/UL (ref 3.88–5.62)
RBC #/AREA URNS AUTO: ABNORMAL /HPF
SODIUM SERPL-SCNC: 139 MMOL/L (ref 136–145)
SP GR UR STRIP.AUTO: 1.01 (ref 1–1.03)
UROBILINOGEN UR QL STRIP.AUTO: 0.2 E.U./DL
WBC # BLD AUTO: 6.49 THOUSAND/UL (ref 4.31–10.16)
WBC #/AREA URNS AUTO: ABNORMAL /HPF

## 2020-09-01 PROCEDURE — 85025 COMPLETE CBC W/AUTO DIFF WBC: CPT

## 2020-09-01 PROCEDURE — 99495 TRANSJ CARE MGMT MOD F2F 14D: CPT | Performed by: FAMILY MEDICINE

## 2020-09-01 PROCEDURE — 80053 COMPREHEN METABOLIC PANEL: CPT

## 2020-09-01 PROCEDURE — 84156 ASSAY OF PROTEIN URINE: CPT

## 2020-09-01 PROCEDURE — 82043 UR ALBUMIN QUANTITATIVE: CPT

## 2020-09-01 PROCEDURE — 81001 URINALYSIS AUTO W/SCOPE: CPT

## 2020-09-01 PROCEDURE — 82570 ASSAY OF URINE CREATININE: CPT

## 2020-09-01 PROCEDURE — 80162 ASSAY OF DIGOXIN TOTAL: CPT | Performed by: INTERNAL MEDICINE

## 2020-09-01 PROCEDURE — 36415 COLL VENOUS BLD VENIPUNCTURE: CPT

## 2020-09-01 RX ORDER — ZOLPIDEM TARTRATE 10 MG/1
10 TABLET ORAL
COMMUNITY
End: 2020-10-21 | Stop reason: ALTCHOICE

## 2020-09-01 NOTE — PROGRESS NOTES
Assessment/Plan:    Problem List Items Addressed This Visit     None           There are no diagnoses linked to this encounter  No problem-specific Assessment & Plan notes found for this encounter  PHQ-9 Depression Screening    PHQ-9:    Frequency of the following problems over the past two weeks: Body mass index is 44 07 kg/m²  BMI Counseling: Body mass index is 44 07 kg/m²  The BMI {VB BMI Counselin}    Subjective:      Patient ID: Sonja Olmos is a 63 y o  male  HPI    The following portions of the patient's history were reviewed and updated as appropriate:   He has a past medical history of A-fib [facility]), Cardiac disease, Chronic combined systolic (congestive) and diastolic (congestive) heart failure ([facility] [address] ), Diabetes mellitus ([facility] [address] ), Dilated cardiomyopathy ([facility] [address] ), History of echocardiogram (2014), History of Lexiscan MPI (2016), echocardiogram (2017), recurrent pneumonia, Hypertension, Long term (current) use of anticoagulants, Morbid (severe) obesity due to excess calories ([facility] [address] ), and Neuropathy  ,  does not have any pertinent problems on file  ,   has a past surgical history that includes Hernia repair; Vascular surgery (Right); Splenectomy, total; and IR thoracentesis (2020)  ,  family history is not on file  ,   reports that he has been smoking cigarettes  He has been smoking about 0 25 packs per day  He has never used smokeless tobacco  He reports current alcohol use of about 14 0 standard drinks of alcohol per week  He reports that he does not use drugs  ,  has No Known Allergies     Current Outpatient Medications   Medication Sig Dispense Refill    carvedilol (COREG) 6 25 mg tablet Take 1 tablet (6 25 mg total) by mouth 2 (two) times a day with meals 180 tablet 3    digoxin (LANOXIN) 0 25 mg tablet Take 1 tablet by mouth once daily 30 tablet 0    furosemide (LASIX) 40 mg tablet Take 1 tablet (40 mg total) by mouth daily 90 tablet 3    metolazone (ZAROXOLYN) 5 mg tablet TAKE 1 TABLET ONE DAY A    WEEK 30 MINUTES BEFORE     LASIX DOSE THAT DAY (Patient taking differently: TAKE 1 TABLET ONCE A   WEEK 30 MINUTES BEFORE LASIX DOSE THAT DAY (Thursday)) 13 tablet 0    omeprazole (PriLOSEC) 40 MG capsule Take 1 capsule (40 mg total) by mouth 2 (two) times a day 60 capsule 2    warfarin (COUMADIN) 5 mg tablet Take 1/2 to 1 tablet by oral route daily  (Patient taking differently: Take 1/2 to 1 tablet by oral route daily  Hold five days before  colonscopy on 5/28) 45 tablet 5    zolpidem (Ambien) 10 mg tablet 10 mg Take one pill prior to Sleep Study       No current facility-administered medications for this visit  Review of Systems      Objective:    /72   Pulse 76   Resp 20   Wt (!) 143 kg (316 lb)   BMI 44 07 kg/m²   Body mass index is 44 07 kg/m²       Physical Exam

## 2020-09-01 NOTE — PROGRESS NOTES
Assessment/Plan:  Medication will reviewed the patient was advised to continue current medication metformin was held at discharge the patient was instructed to check his sugars daily if the sugar elevates above 200-we will consider starting him on insulin since no oral medications can be used for his diabetes with his cirrhosis  Problem List Items Addressed This Visit        Digestive    Cirrhosis of liver with ascites ([facility] [address] )       Endocrine    Diabetes mellitus ([facility] )       Respiratory    STEFF (obstructive sleep apnea)       Cardiovascular and Mediastinum    Atrial fibrillation (HCC) - Primary    Acute on chronic diastolic congestive heart failure (HCC)    Chronic venous insufficiency    Hypertension       Genitourinary    CKD (chronic kidney disease) stage 3, GFR 30-59 ml/min (HCC)       Other    Class 3 severe obesity due to excess calories with serious comorbidity and body mass index (BMI) of 45 0 to 49 9 in [facility])    Lymphedema    Long term (current) use of anticoagulants    Tobacco abuse           Diagnoses and all orders for this visit:    Permanent atrial fibrillation (HCC)    Class 3 severe obesity due to excess calories with serious comorbidity and body mass index (BMI) of 45 0 to 49 9 in [facility])    Acute on chronic diastolic congestive heart failure (HCC)    Lymphedema    Type 2 diabetes mellitus with other specified complication, without long-term current use of insulin ([facility] )    Long term (current) use of anticoagulants    Essential hypertension    STEFF (obstructive sleep apnea)    Chronic venous insufficiency    CKD (chronic kidney disease) stage 3, GFR 30-59 ml/min (HCC)    Cirrhosis of liver with ascites, unspecified hepatic cirrhosis type (HCC)    Tobacco abuse    Other orders  -     zolpidem (Ambien) 10 mg tablet; 10 mg Take one pill prior to Sleep Study        No problem-specific Assessment & Plan notes found for this encounter        PHQ-9 Depression Screening    PHQ-9:    Frequency of the following problems over the past two weeks: Body mass index is 44 07 kg/m²  BMI Counseling: Body mass index is 44 07 kg/m²  The BMI is above normal  Nutrition recommendations include reducing portion sizes, decreasing overall calorie intake, 3-5 servings of fruits/vegetables daily, reducing fast food intake, consuming healthier snacks, decreasing soda and/or juice intake, moderation in carbohydrate intake, increasing intake of lean protein, reducing intake of saturated fat and trans fat and reducing intake of cholesterol  Subjective:      Patient ID: Mildred Miner is a 63 y o  male  HPI    The following portions of the patient's history were reviewed and updated as appropriate:   [facility], Chronic combined systolic (congestive) and diastolic (congestive) heart failure ([facility] [address] ), Diabetes mellitus ([facility] [address] ), Dilated cardiomyopathy ([facility] [address] ), History of echocardiogram (11/24/2014), History of Lexiscan MPI (02/19/2016), echocardiogram (04/28/2017), recurrent pneumonia, Hypertension, Long term (current) use of anticoagulants, Morbid (severe) obesity due to excess calories ([facility] [address] ), and Neuropathy  ,  does not have any pertinent problems on file  ,   has a past surgical history that includes Hernia repair; Vascular surgery (Right); Splenectomy, total; and IR thoracentesis (8/25/2020)  ,  family history is not on file  ,   reports that he has been smoking cigarettes  He has been smoking about 0 25 packs per day  He has never used smokeless tobacco  He reports current alcohol use of about 14 0 standard drinks of alcohol per week  He reports that he does not use drugs  ,  has No Known Allergies     Current Outpatient Medications   Medication Sig Dispense Refill    carvedilol (COREG) 6 25 mg tablet Take 1 tablet (6 25 mg total) by mouth 2 (two) times a day with meals 180 tablet 3    digoxin (LANOXIN) 0 25 mg tablet Take 1 tablet by mouth once daily 30 tablet 0    furosemide (LASIX) 40 mg tablet Take 1 tablet (40 mg total) by mouth daily 90 tablet 3    metolazone (ZAROXOLYN) 5 mg tablet TAKE 1 TABLET ONE DAY A    WEEK 30 MINUTES BEFORE     LASIX DOSE THAT DAY (Patient taking differently: TAKE 1 TABLET ONCE A   WEEK 30 MINUTES BEFORE LASIX DOSE THAT DAY (Thursday)) 13 tablet 0    omeprazole (PriLOSEC) 40 MG capsule Take 1 capsule (40 mg total) by mouth 2 (two) times a day 60 capsule 2    warfarin (COUMADIN) 5 mg tablet Take 1/2 to 1 tablet by oral route daily  (Patient taking differently: Take 1/2 to 1 tablet by oral route daily  Hold five days before  colonscopy on 5/28) 45 tablet 5    zolpidem (Ambien) 10 mg tablet 10 mg Take one pill prior to Sleep Study       No current facility-administered medications for this visit  Review of Systems      Objective:    /72   Pulse 76   Resp 20   Wt (!) 143 kg (316 lb)   BMI 44 07 kg/m²   Body mass index is 44 07 kg/m²       Physical Exam

## 2020-09-01 NOTE — PROGRESS NOTES
TCM Call (since 8/1/2020)     Date and time call was made  8/27/2020  2:42 PM    Hospital care reviewed  Records reviewed    Patient was hospitialized at  [address]    Date of Admission  08/21/20    Date of discharge  08/27/20    Diagnosis  acute on chronic chf (I50 33)    Disposition  Home    Were the patients medications reviewed and updated  Yes    Current Symptoms  None      TCM Call (since 8/1/2020)     Post hospital issues  None    Should patient be enrolled in anticoag monitoring? Yes    Scheduled for follow up?   Yes (Comment)  09/01/2020 @ 11:15AM    Patients specialists  Cardiologist; Nephrologist; Other (comment)    Cardiologist name  dr wiley - apt 09/03/2020    Nephrologist name  pending ref    Other specialists names  sleep medicine - 10/01/2020    Did you obtain your prescribed medications  No    Why were you unable to obtain your medications  no new meds     Do you need help managing your prescriptions or medications  No    Is transportation to your appointment needed  No    I have advised the patient to call PCP with any new or worsening symptoms  [address] or [address] other    Support System  Family    Are you recieving any outpatient services  No    Are you recieving home care services  No    Are you using any community resources  No    Current waiver services  No    Have you fallen in the last 12 months  Yes    How many times  last october and again thankgiving weekend    Interperter language line needed  No    Counseling  Patient

## 2020-09-03 ENCOUNTER — OFFICE VISIT (OUTPATIENT)
Dept: CARDIOLOGY CLINIC | Facility: CLINIC | Age: 60
End: 2020-09-03
Payer: MEDICARE

## 2020-09-03 ENCOUNTER — ANTICOAG VISIT (OUTPATIENT)
Dept: CARDIOLOGY CLINIC | Facility: CLINIC | Age: 60
End: 2020-09-03
Payer: MEDICARE

## 2020-09-03 VITALS
HEIGHT: 71 IN | WEIGHT: 315 LBS | SYSTOLIC BLOOD PRESSURE: 132 MMHG | BODY MASS INDEX: 44.1 KG/M2 | DIASTOLIC BLOOD PRESSURE: 80 MMHG | HEART RATE: 76 BPM

## 2020-09-03 DIAGNOSIS — I50.32 CHRONIC DIASTOLIC CONGESTIVE HEART FAILURE (HCC): ICD-10-CM

## 2020-09-03 DIAGNOSIS — Z79.01 LONG TERM (CURRENT) USE OF ANTICOAGULANTS: ICD-10-CM

## 2020-09-03 DIAGNOSIS — J90 PLEURAL EFFUSION ON RIGHT: ICD-10-CM

## 2020-09-03 DIAGNOSIS — G47.33 OSA (OBSTRUCTIVE SLEEP APNEA): ICD-10-CM

## 2020-09-03 DIAGNOSIS — I48.21 PERMANENT ATRIAL FIBRILLATION (HCC): ICD-10-CM

## 2020-09-03 DIAGNOSIS — I48.21 PERMANENT ATRIAL FIBRILLATION (HCC): Primary | ICD-10-CM

## 2020-09-03 LAB
DIGOXIN SERPL-MCNC: 1.5 NG/ML (ref 0.8–2)
SL AMB POCT INR: 1.4

## 2020-09-03 PROCEDURE — 99214 OFFICE O/P EST MOD 30 MIN: CPT | Performed by: INTERNAL MEDICINE

## 2020-09-03 PROCEDURE — 93793 ANTICOAG MGMT PT WARFARIN: CPT | Performed by: PHYSICIAN ASSISTANT

## 2020-09-03 PROCEDURE — 85610 PROTHROMBIN TIME: CPT | Performed by: PHYSICIAN ASSISTANT

## 2020-09-03 NOTE — PATIENT INSTRUCTIONS

## 2020-09-03 NOTE — PATIENT INSTRUCTIONS
Anesthesia Release from PACU Note    Patient: Kan Matamoros    Procedure(s) Performed: Procedure(s) (LRB):  CARDIAC ELECTROPHYSIOLOGY STUDY, NONINVASIVE (N/A)    Anesthesia type: general    Post pain: Adequate analgesia    Post assessment: no apparent anesthetic complications and tolerated procedure well    Last Vitals:   Vitals:    06/26/19 1145   BP: (!) 112/53   Pulse: 82   Resp: 20   Temp: 36.7 °C (98 °F)         Post vital signs: stable    Level of consciousness: awake and alert     Nausea/Vomiting: no nausea/no vomiting    Complications: none    Airway Patency: patent    Respiratory: unassisted    Cardiovascular: stable and blood pressure at baseline    Hydration: euvolemic     If no changes in medication health or diet  No missed or extra doses  No s/s of bleeding TIA or CVA  No new ABX, NSAIDs OCT meds, or supplements  Dose as instructed and recheck in two weeks     Sheyla Tong PA-C

## 2020-09-03 NOTE — PROGRESS NOTES
Subjective:        Patient ID: Marissa Crowley is a 63 y o  male  Chief Complaint:  Adelfa Lanes had a therapeutic thoracentesis recently, hospitalization notes reviewed  Says he feels much improved  He is trying to lose some weight on a diet  Medication changes noted  No chest pain no palpitations no shortness of breath and he is impressed with how thin his legs look lately    The following portions of the patient's history were reviewed and updated as appropriate: allergies, current medications, past family history, past medical history, past social history, past surgical history and problem list   Review of Systems   Constitution: Negative for chills, diaphoresis, malaise/fatigue and weight gain  HENT: Negative for nosebleeds and stridor  Eyes: Negative for double vision, vision loss in left eye, vision loss in right eye and visual disturbance  Cardiovascular: Negative for chest pain, claudication, cyanosis, dyspnea on exertion, irregular heartbeat, leg swelling, near-syncope, orthopnea, palpitations, paroxysmal nocturnal dyspnea and syncope  Respiratory: Negative for cough, shortness of breath, snoring and wheezing  Endocrine: Negative for polydipsia, polyphagia and polyuria  Hematologic/Lymphatic: Negative for bleeding problem  Does not bruise/bleed easily  Skin: Negative for flushing and rash  Musculoskeletal: Negative for falls and myalgias  Gastrointestinal: Negative for abdominal pain, heartburn, hematemesis, hematochezia, melena and nausea  Genitourinary: Negative for hematuria  Neurological: Negative for brief paralysis, dizziness, focal weakness, headaches, light-headedness, loss of balance and vertigo  Psychiatric/Behavioral: Negative for altered mental status and substance abuse  Allergic/Immunologic: Negative for hives            Objective:      /80   Pulse 76   Ht 5' 11" (1 803 m)   Wt (!) 144 kg (318 lb)   BMI 44 35 kg/m²   Physical Exam   Constitutional: He is oriented to person, place, and time  He appears well-developed and well-nourished  No distress  HENT:   Head: Normocephalic and atraumatic  Eyes: Pupils are equal, round, and reactive to light  EOM are normal  No scleral icterus  Neck: Normal range of motion  Neck supple  No JVD present  No thyromegaly present  Cardiovascular: Normal rate, regular rhythm and normal heart sounds  Exam reveals no gallop and no friction rub  No murmur heard  Pulmonary/Chest: Effort normal and breath sounds normal  No stridor  No respiratory distress  He has no wheezes  He has no rales  Abdominal: Soft  Bowel sounds are normal  He exhibits no distension and no mass  There is no abdominal tenderness  Musculoskeletal: Normal range of motion  General: Edema (Stasis changes mild bilateral edema but improved compared to prior) present  No deformity  Neurological: He is alert and oriented to person, place, and time  Coordination normal    Skin: Skin is warm and dry  No erythema  No pallor  Psychiatric: He has a normal mood and affect   His behavior is normal        Lab Review:   Appointment on 09/01/2020   Component Date Value    Sodium 09/01/2020 139     Potassium 09/01/2020 4 4     Chloride 09/01/2020 103     CO2 09/01/2020 29     ANION GAP 09/01/2020 7     BUN 09/01/2020 23     Creatinine 09/01/2020 1 65*    Glucose, Fasting 09/01/2020 122*    Calcium 09/01/2020 8 7     AST 09/01/2020 25     ALT 09/01/2020 23     Alkaline Phosphatase 09/01/2020 137*    Total Protein 09/01/2020 7 8     Albumin 09/01/2020 3 1*    Total Bilirubin 09/01/2020 0 90     eGFR 09/01/2020 44     WBC 09/01/2020 6 49     RBC 09/01/2020 2 89*    Hemoglobin 09/01/2020 9 7*    Hematocrit 09/01/2020 30 5*    MCV 09/01/2020 106*    MCH 09/01/2020 33 6     MCHC 09/01/2020 31 8     RDW 09/01/2020 13 5     MPV 09/01/2020 10 5     Platelets 79/49/3221 119*    nRBC 09/01/2020 0     Neutrophils Relative 09/01/2020 74     Immat GRANS % 09/01/2020 1     Lymphocytes Relative 09/01/2020 13*    Monocytes Relative 09/01/2020 7     Eosinophils Relative 09/01/2020 4     Basophils Relative 09/01/2020 1     Neutrophils Absolute 09/01/2020 4 87     Immature Grans Absolute 09/01/2020 0 03     Lymphocytes Absolute 09/01/2020 0 86     Monocytes Absolute 09/01/2020 0 44     Eosinophils Absolute 09/01/2020 0 24     Basophils Absolute 09/01/2020 0 05     Clarity, UA 09/01/2020 Slightly Cloudy     Color, UA 09/01/2020 Yellow     Specific Gravity, UA 09/01/2020 1 010     pH, UA 09/01/2020 7 5     Glucose, UA 09/01/2020 Negative     Ketones, UA 09/01/2020 Negative     Blood, UA 09/01/2020 Negative     Protein, UA 09/01/2020 Trace*    Nitrite, UA 09/01/2020 Negative     Bilirubin, UA 09/01/2020 Negative     Urobilinogen, UA 09/01/2020 0 2     Leukocytes, UA 09/01/2020 Negative     WBC, UA 09/01/2020 1-2*    RBC, UA 09/01/2020 None Seen     Bacteria, UA 09/01/2020 Occasional     Epithelial Cells 09/01/2020 Occasional     Creatinine, Ur 09/01/2020 113 0     Protein Urine Random 09/01/2020 48     Prot/Creat Ratio, Ur 09/01/2020 0 42*    Creatinine, Ur 09/01/2020 113 0     Microalbum  ,U,Random 09/01/2020 20 0     Microalb Creat Ratio 09/01/2020 18    No results displayed because visit has over 200 results        Anticoag visit on 08/12/2020   Component Date Value    POCT INR 08/12/2020 1 9    Anticoag visit on 07/15/2020   Component Date Value    POCT INR 07/15/2020 2 6    Orders Only on 07/06/2020   Component Date Value    SARS-CoV-2  07/06/2020 Not Detected     Inpatient 07/06/2020 Comment    Anticoag visit on 06/25/2020   Component Date Value    POCT INR 06/25/2020 2 4    Anticoag visit on 06/18/2020   Component Date Value    POCT INR 06/18/2020 3 8    Anticoag visit on 06/11/2020   Component Date Value    POCT INR 06/11/2020 1 8    Hospital Outpatient Visit on 06/02/2020   Component Date Value    POC Glucose 06/02/2020 [address] Report 06/02/2020                      Value:Surgical Pathology Report                         Case: [id number]                                   Authorizing Provider:  Adela Nguyen MD  Collected:           06/02/2020 1046              Ordering Location:     [facility] Received:            06/02/2020 1401                                     Operating Room                                                               Pathologist:           Mansi Genao MD                                                                Specimens:   A) - Stomach, gastric biopsy rule out h pylori                                                      B) - Duodenum, duodenal biopsy rule out celiac disease                                     Final Diagnosis 06/02/2020                      Value: This result contains rich text formatting which cannot be displayed here   Additional Information 06/02/2020                      Value: This result contains rich text formatting which cannot be displayed here  [facility] Description 06/02/2020                      Value: This result contains rich text formatting which cannot be displayed here   Clinical Information 06/02/2020                      Value:Anemia, unspecified type    POC Glucose 06/02/2020 108    Orders Only on 05/27/2020   Component Date Value    SARS-CoV-2  05/27/2020 Not Detected    There may be more visits with results that are not included  Xr Chest Portable    Result Date: 8/26/2020  Narrative: CHEST INDICATION:   s/p right thora  COMPARISON:  Chest radiograph from 8/24/2020 and chest CT from 8/21/2020  EXAM PERFORMED/VIEWS:  XR CHEST PORTABLE FINDINGS: Cardiomegaly  Small right effusion and minimal right base atelectasis  No pneumothorax  Osseous structures appear within normal limits for patient age  Impression: Small right effusion with no pneumothorax   Workstation performed: [id number]     Xr Chest Portable    Result Date: 8/24/2020  Narrative: CHEST INDICATION:   pleural effusion  COMPARISON:  Chest radiograph from 5/12/2016  Chest CT from 8/21/2020  EXAM PERFORMED/VIEWS:  XR CHEST PORTABLE FINDINGS: Cardiomediastinal silhouette appears unremarkable  Persistent moderate right effusion and right base atelectasis  No pneumothorax  Osseous structures appear within normal limits for patient age  Impression: Persistent moderate right effusion and right base atelectasis, unchanged from 3 days earlier  Workstation performed: [id number]     Ir Thoracentesis    Result Date: 8/25/2020  Narrative: EXAMINATION: Thoracentesis with sonographic guidance  HISTORY: Pleural effusion, dyspnea TECHNIQUE: The patient was brought to Radiology  Informed consent was obtained  The right lower back was prepped and draped in the usual sterile fashion  Timeout was performed  Lidocaine was given as local anesthesia  Using sonographic guidance, a 6 Bengali centesis catheter was advanced into the fluid  The catheter was connected to a vacuum bottle  The fluid was drained in its entirety  A total volume of 400 cc of Transparent appearing, Yellow colored fluid was removed  The catheter was removed  Specimens were sent to the laboratory for evaluation  Impression: Technically successful ultrasound-guided thoracentesis  This is the end of the clinically relevant portion of this report  Subsequent information is for compliance, documentation, and coding purposes  In the process of informed consent, information was communicated, verbally, to the patient regarding the procedure  The patient was informed of; the name of the procedure, nature of the procedure, nature of the condition, risks, benefits, alternatives, and potential complications, as well as the consequences of not having the procedure  All the patient's questions were answered  Informed consent was signed    Quoted risks include bleeding, and pneumothorax, including axial vacuo pneumothorax  Chlorhexidine and alcohol was used for cleansing and sterile preparation of the skin  This was allowed to dry prior to the application of sterile draping  Timeout was performed, with all team members present, and in agreement  Confirmation of patient, procedure, laterally, allergies, and all needed equipment was performed  PROCEDURE: Thoracentesis PREPROCEDURE DIAGNOSIS: Please see "history ", above POSTPROCEDURE DIAGNOSIS: Same ANTIBIOTICS: None SPECIMEN: Pleural fluid ESTIMATED BLOOD LOSS: None ANESTHESIA: Local FINAL DISPOSITION OF CATHETER: Out of patient's body Ultrasound was used to evaluate the pleural fluid as a potential access site  Static and real-time images of needle entry were obtained, and archived  Workstation performed: [id number]     Pe Study With Ct Abdomen And Pelvis With Contrast    Result Date: 8/21/2020  Narrative: CT PULMONARY ANGIOGRAM OF THE CHEST AND CT ABDOMEN AND PELVIS WITH INTRAVENOUS CONTRAST INDICATION:   Shortness of breath, pain with a deep breath elevated D-dimer abdominal bloating  [place] note reviewed, presents with shortness of breath and abdominal bloating , mildly elevated d-dimer COMPARISON:  CT abdomen and pelvis 4/7/20 TECHNIQUE:  CT examination of the chest, abdomen and pelvis was performed  Thin section CT angiographic technique was used in the chest in order to evaluate for pulmonary embolus and coronal 3D MIP postprocessing was performed on the acquisition scanner  Axial, sagittal, and coronal 2D reformatted images were created from the source data and submitted for interpretation  Radiation dose length product (DLP) for this visit:  2145 52 mGy-cm   This examination, like all CT scans performed in the [facility], was performed utilizing techniques to minimize radiation dose exposure, including the use of iterative reconstruction and automated exposure control   IV Contrast:  100 mL of iohexol (OMNIPAQUE) Enteric Contrast: Enteric contrast was not administered  FINDINGS: CHEST PULMONARY ARTERIAL TREE:  Motion artifact creates pseudofilling defect within the lingular branches as noted on series 2A/124, otherwise pulmonary arteries are clear LUNGS:  Subsegmental atelectasis in the dependent portions of the right lower lobe Scattered small groundglass opacities on the order of about 5 mm left lower lobe series 3/72 PLEURA:  Moderate right pleural effusion HEART/AORTA:  Enlarged left atrium MEDIASTINUM AND DILLON:  Unremarkable  CHEST WALL AND LOWER NECK:   Unremarkable  ABDOMEN LIVER/BILIARY TREE:  Enlarged liver, with span of 23 6 cm  The peripheral contour is nodular GALLBLADDER:  Cholelithiasis SPLEEN:  Again noted is capsular calcification with a subcapsular collection measuring about 6 1 cm, consistent with prior trauma PANCREAS:  Unremarkable  ADRENAL GLANDS:  Unremarkable  KIDNEYS/URETERS:  Unremarkable  No hydronephrosis  STOMACH AND BOWEL:  Unremarkable  APPENDIX:  No findings to suggest appendicitis  ABDOMINOPELVIC CAVITY:  Mild mesenteric edema  Trace ascites predominantly adjacent to the liver  VESSELS:  Unremarkable for patient's age  PELVIS REPRODUCTIVE ORGANS:  Unremarkable for patient's age  URINARY BLADDER:  Unremarkable  ABDOMINAL WALL/INGUINAL REGIONS:  Anasarca  Large venous collaterals in the right inguinal and left thigh region OSSEOUS STRUCTURES:  No acute fracture or destructive osseous lesion  Impression: 1  No pulmonary embolism 2  Moderate right pleural effusion with adjacent atelectasis 3  Groundglass nodules in the left lower lobe, infectious versus inflammatory in etiology 4  Cirrhotic morphology 5  Cholelithiasis without cholecystitis 6  Chronic subcapsular splenic collection, consistent with remote trauma 7  Anasarca with trace ascites Workstation performed: [id number]         Assessment:       1  Permanent atrial fibrillation (HCC)  Digoxin level   2  STEFF (obstructive sleep apnea)     3  Pleural effusion on right     4  Chronic diastolic congestive heart failure ([address] )     5  Long term (current) use of anticoagulants          Plan:       Lennox Grieves AFib is rate controlled and anticoagulated  His volume status is markedly improved, continue present diuretics  I do not hear nor percuss any significant evidence of pleural effusion on the right  He is not having any angina  I am happy to hear that he is following through with sleep apnea fitting and therapy  He says he is aware of his cirrhosis diagnosis, this will make volume control difficult  I made no medication changes today asked him to come back and see me in 3 months time, asked him to call any significant weight gain (3 lb in 1 day or 5 lb in 3 days) in the meantime  He is watching his sodium and fluid intake

## 2020-09-04 ENCOUNTER — OFFICE VISIT (OUTPATIENT)
Dept: NEPHROLOGY | Facility: CLINIC | Age: 60
End: 2020-09-04
Payer: MEDICARE

## 2020-09-04 VITALS
WEIGHT: 315 LBS | HEIGHT: 71 IN | BODY MASS INDEX: 44.1 KG/M2 | HEART RATE: 87 BPM | OXYGEN SATURATION: 97 % | DIASTOLIC BLOOD PRESSURE: 82 MMHG | SYSTOLIC BLOOD PRESSURE: 142 MMHG | TEMPERATURE: 97.8 F

## 2020-09-04 DIAGNOSIS — I10 ESSENTIAL HYPERTENSION: ICD-10-CM

## 2020-09-04 DIAGNOSIS — E87.6 HYPOKALEMIA: ICD-10-CM

## 2020-09-04 DIAGNOSIS — I50.32 CHRONIC DIASTOLIC CONGESTIVE HEART FAILURE (HCC): ICD-10-CM

## 2020-09-04 DIAGNOSIS — E11.69 TYPE 2 DIABETES MELLITUS WITH OTHER SPECIFIED COMPLICATION, WITHOUT LONG-TERM CURRENT USE OF INSULIN (HCC): ICD-10-CM

## 2020-09-04 DIAGNOSIS — I48.21 PERMANENT ATRIAL FIBRILLATION (HCC): ICD-10-CM

## 2020-09-04 DIAGNOSIS — G47.33 OSA (OBSTRUCTIVE SLEEP APNEA): ICD-10-CM

## 2020-09-04 DIAGNOSIS — N18.30 CKD (CHRONIC KIDNEY DISEASE) STAGE 3, GFR 30-59 ML/MIN (HCC): Primary | ICD-10-CM

## 2020-09-04 PROCEDURE — 99204 OFFICE O/P NEW MOD 45 MIN: CPT | Performed by: INTERNAL MEDICINE

## 2020-09-04 RX ORDER — METOLAZONE 5 MG/1
TABLET ORAL
Qty: 13 TABLET | Refills: 0
Start: 2020-09-04 | End: 2020-12-16 | Stop reason: SDUPTHER

## 2020-09-04 NOTE — ASSESSMENT & PLAN NOTE
Patient appears to have an estimated GFR between 45-50 mL/ hour  No significant proteinuria this time  Etiology CKD may be due to hypertensive nephrosclerosis, may also be due to hemodynamic abnormalities reduced cardiac output  Decreased blood flow will decrease estimated GFR

## 2020-09-04 NOTE — ASSESSMENT & PLAN NOTE
Blood pressure only slightly elevated at this time, patient claims that typical blood pressures are about 874 mmHg systolic at home  Continue with carvedilol

## 2020-09-04 NOTE — ASSESSMENT & PLAN NOTE
Continue management according to Cardiology, currently on carvedilol and digoxin along with Coumadin

## 2020-09-04 NOTE — PROGRESS NOTES
[facility] [place], [place]    Name: Niki Sweeney  YOB: 1960      Assessment/Plan:    CKD (chronic kidney disease) stage 3, GFR 30-59 ml/min ([facility] )  Patient appears to have an estimated GFR between 45-50 mL/ hour  No significant proteinuria this time  Etiology CKD may be due to hypertensive nephrosclerosis, may also be due to hemodynamic abnormalities reduced cardiac output  Decreased blood flow will decrease estimated GFR  Hypertension    Blood pressure only slightly elevated at this time, patient claims that typical blood pressures are about 958 mmHg systolic at home  Continue with carvedilol  Chronic diastolic congestive heart failure (HCC)  Wt Readings from Last 3 Encounters:   09/04/20 (!) 145 kg (319 lb 3 2 oz)   09/03/20 (!) 144 kg (318 lb)   09/01/20 (!) 143 kg (316 lb)       Patient remains compensated continues to lose weight slowly since discharge from hospital, however, over last 3 days it appears that there has been some weight gain  We spent some time reviewing low-sodium diet which the patient appears to have a good grasp on, however, they also went out and had breakfast this morning  Continue with metolazone once a week, continue furosemide 40 mg once daily  We will start to check avoid the patient's weight by 5 lb every week or 2  At that time the patient will have blood work checked to ensure kidney function electrolytes are appropriate  Atrial fibrillation ([email or web address] )    Continue management according to Cardiology, currently on carvedilol and digoxin along with Coumadin  Hypokalemia    Potassium Level appropriate, continue monitor           Problem List Items Addressed This Visit        Endocrine    Diabetes mellitus ([email or web address] )       Respiratory    STEFF (obstructive sleep apnea)       Cardiovascular and Mediastinum    Atrial fibrillation [facility])       Continue management according to Cardiology, currently on carvedilol and digoxin along with Coumadin  Chronic diastolic congestive heart failure (HCC)     Wt Readings from Last 3 Encounters:   09/04/20 (!) 145 kg (319 lb 3 2 oz)   09/03/20 (!) 144 kg (318 lb)   09/01/20 (!) 143 kg (316 lb)       Patient remains compensated continues to lose weight slowly since discharge from hospital, however, over last 3 days it appears that there has been some weight gain  We spent some time reviewing low-sodium diet which the patient appears to have a good grasp on, however, they also went out and had breakfast this morning  Continue with metolazone once a week, continue furosemide 40 mg once daily  We will start to check avoid the patient's weight by 5 lb every week or 2  At that time the patient will have blood work checked to ensure kidney function electrolytes are appropriate  Relevant Medications    metolazone (ZAROXOLYN) 5 mg tablet    Hypertension       Blood pressure only slightly elevated at this time, patient claims that typical blood pressures are about 239 mmHg systolic at home  Continue with carvedilol  Relevant Medications    metolazone (ZAROXOLYN) 5 mg tablet       Genitourinary    CKD (chronic kidney disease) stage 3, GFR 30-59 ml/min ([facility]) - Primary     Patient appears to have an estimated GFR between 45-50 mL/ hour  No significant proteinuria this time  Etiology CKD may be due to hypertensive nephrosclerosis, may also be due to hemodynamic abnormalities reduced cardiac output  Decreased blood flow will decrease estimated GFR  Relevant Medications    metolazone (ZAROXOLYN) 5 mg tablet    Other Relevant Orders    Basic metabolic panel    Magnesium       Other    Hypokalemia       Potassium Level appropriate, continue monitor  Thank you for allowing us to participate in the care of your patient    We will see him back for regular appointment in approximately 4 months, but blood work will be done about every 2 weeks or so according to the patient's weight loss  I expect him to lose about 5 lb of fluid weight every week or 2 and then have labs checked to ensure kidney function electrolytes are appropriate  Subjective:      Patient ID: Gayle Ledesma is a 63 y o  male  Hypertension   This is a chronic problem  The current episode started more than 1 year ago  The problem is unchanged  The problem is controlled  Associated symptoms include peripheral edema and shortness of breath  Pertinent negatives include no chest pain or orthopnea  There are no associated agents to hypertension  Risk factors for coronary artery disease include diabetes mellitus, male gender and obesity  The following portions of the patient's history were reviewed and updated as appropriate: allergies, current medications, past family history, past medical history, past social history, past surgical history and problem list     Review of Systems   Respiratory: Positive for shortness of breath  Cardiovascular: Negative for chest pain and orthopnea  All other systems reviewed and are negative  Social History     Socioeconomic History    Marital status: /Civil Union     Spouse name: None    Number of children: None    Years of education: None    Highest education level: None   Occupational History    None   Social Needs    Financial resource strain: None    Food insecurity     Worry: None     Inability: None    Transportation needs     Medical: None     Non-medical: None   Tobacco Use    Smoking status: Current Some Day Smoker     Packs/day: 0 25     Types: Cigarettes    Smokeless tobacco: Never Used    Tobacco comment: 2-3 CIGARETTES DAILY   Substance and Sexual Activity    Alcohol use:  Yes     Alcohol/week: 14 0 standard drinks     Types: 5 Glasses of wine, 5 Cans of beer, 4 Shots of liquor per week     Frequency: 2-3 times a week     Drinks per session: 5 or 6     Binge frequency: Never     Comment: WEEKLY    Drug use: Never    Sexual activity: Yes   Lifestyle    Physical activity     Days per week: None     Minutes per session: None    Stress: None   Relationships    Social connections     Talks on phone: None     Gets together: None     Attends [employer] witness service: None     Active member of club or organization: None     Attends meetings of clubs or organizations: None     Relationship status: None    Intimate partner violence     Fear of current or ex partner: None     Emotionally abused: None     Physically abused: None     Forced sexual activity: None   Other Topics Concern    None   Social History Narrative    None     Past Medical History:   Diagnosis Date    A-fib [facility])     Cardiac disease     Chronic combined systolic (congestive) and diastolic (congestive) heart failure ([facility] )     Diabetes mellitus ([facility] )     Dilated cardiomyopathy ([facility] )     History of echocardiogram 11/24/2014    EF 0 30 (30%), Likely mod LV systolic dysfunction  Likely RV dysfunction as well   History of Lexiscan MPI 02/19/2016    EF 0 43 (43%), no prior MI or ischemia   Hx of echocardiogram 04/28/2017    Normal EF, Normal LV systolic function  Mild concentric LV hypertrophy  Mild mitral and tricuspid regurg      Hx: recurrent pneumonia     Hypertension     Long term (current) use of anticoagulants     Morbid (severe) obesity due to excess calories (HCC)     Neuropathy      Past Surgical History:   Procedure Laterality Date    HERNIA REPAIR      IR THORACENTESIS  8/25/2020    SPLENECTOMY, TOTAL      VASCULAR SURGERY Right     leg       Current Outpatient Medications:     carvedilol (COREG) 6 25 mg tablet, Take 1 tablet (6 25 mg total) by mouth 2 (two) times a day with meals, Disp: 180 tablet, Rfl: 3    digoxin (LANOXIN) 0 25 mg tablet, Take 1 tablet by mouth once daily, Disp: 30 tablet, Rfl: 0    furosemide (LASIX) 40 mg tablet, Take 1 tablet (40 mg total) by mouth daily, Disp: 90 tablet, Rfl: 3    metolazone (ZAROXOLYN) 5 mg tablet, TAKE 1 TABLET ONCE A   WEEK 30 MINUTES BEFORE LASIX DOSE THAT DAY (Thursday), Disp: 13 tablet, Rfl: 0    omeprazole (PriLOSEC) 40 MG capsule, Take 1 capsule (40 mg total) by mouth 2 (two) times a day, Disp: 60 capsule, Rfl: 2    warfarin (COUMADIN) 5 mg tablet, Take 1/2 to 1 tablet by oral route daily  (Patient taking differently: Take 1/2 to 1 tablet by oral route daily   Hold five days before  colonscopy on 5/28), Disp: 45 tablet, Rfl: 5    zolpidem (Ambien) 10 mg tablet, 10 mg Take one pill prior to Sleep Study, Disp: , Rfl:     Lab Results   Component Value Date     12/16/2015    SODIUM 139 09/01/2020    K 4 4 09/01/2020     09/01/2020    CO2 29 09/01/2020    ANIONGAP 11 12/16/2015    AGAP 7 09/01/2020    BUN 23 09/01/2020    CREATININE 1 65 (H) 09/01/2020    GLUC 115 08/27/2020    GLUF 122 (H) 09/01/2020    CALCIUM 8 7 09/01/2020    AST 25 09/01/2020    ALT 23 09/01/2020    ALKPHOS 137 (H) 09/01/2020    PROT 7 6 12/16/2015    TP 7 8 09/01/2020    BILITOT 1 03 (H) 12/16/2015    TBILI 0 90 09/01/2020    EGFR 44 09/01/2020     Lab Results   Component Value Date    WBC 6 49 09/01/2020    HGB 9 7 (L) 09/01/2020    HCT 30 5 (L) 09/01/2020     (H) 09/01/2020     (L) 09/01/2020     Lab Results   Component Value Date    CHOLESTEROL 143 03/05/2020    CHOLESTEROL 125 07/31/2018     Lab Results   Component Value Date    HDL 41 03/05/2020    HDL 29 (L) 07/31/2018     Lab Results   Component Value Date    LDLCALC 73 03/05/2020    LDLCALC 65 07/31/2018     Lab Results   Component Value Date    TRIG 144 03/05/2020    TRIG 157 (H) 07/31/2018     No results found for: [place], [place]  Lab Results   Component Value Date    EKA0TIUGPJQU 2 002 07/31/2018     Lab Results   Component Value Date    CALCIUM 8 7 09/01/2020    PHOS 2 2 (L) 06/07/2015     No results found for: SPEP, UPEP  No results found for: FELICE STARK        Objective:      /82   Pulse 87   Temp 97 8 °F (36 6 °C)   Ht 5' 11" (1 803 m)   Wt (!) 145 kg (319 lb 3 2 oz)   SpO2 97%   BMI 44 52 kg/m²          Physical Exam  Vitals signs reviewed  Constitutional:       General: He is not in acute distress  Appearance: He is well-developed  HENT:      Head: Normocephalic and atraumatic  Eyes:      Conjunctiva/sclera: Conjunctivae normal    Neck:      Musculoskeletal: Neck supple  Cardiovascular:      Rate and Rhythm: Normal rate and regular rhythm  Pulmonary:      Effort: Pulmonary effort is normal       Breath sounds: Normal breath sounds  Abdominal:      Palpations: Abdomen is soft  Skin:     General: Skin is warm  Findings: No rash  Neurological:      Mental Status: He is alert and oriented to person, place, and time  Cranial Nerves: No cranial nerve deficit     Psychiatric:         Behavior: Behavior normal

## 2020-09-04 NOTE — ASSESSMENT & PLAN NOTE
Wt Readings from Last 3 Encounters:   09/04/20 (!) 145 kg (319 lb 3 2 oz)   09/03/20 (!) 144 kg (318 lb)   09/01/20 (!) 143 kg (316 lb)       Patient remains compensated continues to lose weight slowly since discharge from hospital, however, over last 3 days it appears that there has been some weight gain  We spent some time reviewing low-sodium diet which the patient appears to have a good grasp on, however, they also went out and had breakfast this morning  Continue with metolazone once a week, continue furosemide 40 mg once daily  We will start to check avoid the patient's weight by 5 lb every week or 2  At that time the patient will have blood work checked to ensure kidney function electrolytes are appropriate

## 2020-09-14 RX ORDER — OMEPRAZOLE 40 MG/1
CAPSULE, DELAYED RELEASE ORAL
Qty: 180 CAPSULE | Refills: 0 | Status: ON HOLD | OUTPATIENT
Start: 2020-09-14 | End: 2021-01-01 | Stop reason: SDUPTHER

## 2020-09-17 ENCOUNTER — ANTICOAG VISIT (OUTPATIENT)
Dept: CARDIOLOGY CLINIC | Facility: CLINIC | Age: 60
End: 2020-09-17
Payer: MEDICARE

## 2020-09-17 DIAGNOSIS — Z79.01 LONG TERM (CURRENT) USE OF ANTICOAGULANTS: ICD-10-CM

## 2020-09-17 DIAGNOSIS — I48.21 PERMANENT ATRIAL FIBRILLATION (HCC): ICD-10-CM

## 2020-09-17 LAB — SL AMB POCT INR: 2.3

## 2020-09-17 PROCEDURE — 85610 PROTHROMBIN TIME: CPT | Performed by: PHYSICIAN ASSISTANT

## 2020-09-17 PROCEDURE — 93793 ANTICOAG MGMT PT WARFARIN: CPT | Performed by: PHYSICIAN ASSISTANT

## 2020-09-17 NOTE — PATIENT INSTRUCTIONS
No changes in medication health or diet  No missed or extra doses  No s/s of bleeding TIA or CVA  No new ABX, NSAIDs OCT meds, or supplements  Dose as instructed and recheck in three weeks     Emi Burdick PA-C

## 2020-09-24 ENCOUNTER — TELEPHONE (OUTPATIENT)
Dept: SLEEP CENTER | Facility: CLINIC | Age: 60
End: 2020-09-24

## 2020-09-24 DIAGNOSIS — Z20.822 ENCOUNTER FOR LABORATORY TESTING FOR COVID-19 VIRUS: Primary | ICD-10-CM

## 2020-10-01 ENCOUNTER — ANTICOAG VISIT (OUTPATIENT)
Dept: CARDIOLOGY CLINIC | Facility: CLINIC | Age: 60
End: 2020-10-01
Payer: MEDICARE

## 2020-10-01 DIAGNOSIS — I48.21 PERMANENT ATRIAL FIBRILLATION (HCC): ICD-10-CM

## 2020-10-01 DIAGNOSIS — Z79.01 LONG TERM (CURRENT) USE OF ANTICOAGULANTS: ICD-10-CM

## 2020-10-01 LAB — SL AMB POCT INR: 2.9

## 2020-10-01 PROCEDURE — 93793 ANTICOAG MGMT PT WARFARIN: CPT | Performed by: PHYSICIAN ASSISTANT

## 2020-10-01 PROCEDURE — 85610 PROTHROMBIN TIME: CPT | Performed by: PHYSICIAN ASSISTANT

## 2020-10-13 ENCOUNTER — OFFICE VISIT (OUTPATIENT)
Dept: FAMILY MEDICINE CLINIC | Facility: CLINIC | Age: 60
End: 2020-10-13
Payer: MEDICARE

## 2020-10-13 VITALS
HEIGHT: 71 IN | HEART RATE: 76 BPM | SYSTOLIC BLOOD PRESSURE: 132 MMHG | TEMPERATURE: 97.5 F | BODY MASS INDEX: 44.1 KG/M2 | RESPIRATION RATE: 20 BRPM | WEIGHT: 315 LBS | DIASTOLIC BLOOD PRESSURE: 84 MMHG

## 2020-10-13 DIAGNOSIS — I87.2 CHRONIC VENOUS INSUFFICIENCY: ICD-10-CM

## 2020-10-13 DIAGNOSIS — I83.12 VARICOSE VEINS OF BOTH LOWER EXTREMITIES WITH INFLAMMATION: ICD-10-CM

## 2020-10-13 DIAGNOSIS — Z79.01 LONG TERM (CURRENT) USE OF ANTICOAGULANTS: ICD-10-CM

## 2020-10-13 DIAGNOSIS — I83.11 VARICOSE VEINS OF BOTH LOWER EXTREMITIES WITH INFLAMMATION: ICD-10-CM

## 2020-10-13 DIAGNOSIS — E11.69 TYPE 2 DIABETES MELLITUS WITH OTHER SPECIFIED COMPLICATION, WITHOUT LONG-TERM CURRENT USE OF INSULIN (HCC): ICD-10-CM

## 2020-10-13 DIAGNOSIS — I10 ESSENTIAL HYPERTENSION: ICD-10-CM

## 2020-10-13 DIAGNOSIS — Z23 NEED FOR VACCINATION: Primary | ICD-10-CM

## 2020-10-13 DIAGNOSIS — I50.32 CHRONIC DIASTOLIC CONGESTIVE HEART FAILURE (HCC): ICD-10-CM

## 2020-10-13 DIAGNOSIS — I89.0 LYMPHEDEMA: ICD-10-CM

## 2020-10-13 DIAGNOSIS — I48.21 PERMANENT ATRIAL FIBRILLATION (HCC): ICD-10-CM

## 2020-10-13 DIAGNOSIS — E66.01 CLASS 3 SEVERE OBESITY DUE TO EXCESS CALORIES WITH SERIOUS COMORBIDITY AND BODY MASS INDEX (BMI) OF 45.0 TO 49.9 IN ADULT (HCC): ICD-10-CM

## 2020-10-13 PROCEDURE — G0008 ADMIN INFLUENZA VIRUS VAC: HCPCS

## 2020-10-13 PROCEDURE — 99214 OFFICE O/P EST MOD 30 MIN: CPT | Performed by: FAMILY MEDICINE

## 2020-10-13 PROCEDURE — 90686 IIV4 VACC NO PRSV 0.5 ML IM: CPT

## 2020-10-15 ENCOUNTER — TELEPHONE (OUTPATIENT)
Dept: GASTROENTEROLOGY | Facility: CLINIC | Age: 60
End: 2020-10-15

## 2020-10-21 ENCOUNTER — ANESTHESIA EVENT (OUTPATIENT)
Dept: PERIOP | Facility: HOSPITAL | Age: 60
End: 2020-10-21

## 2020-10-21 ENCOUNTER — ANTICOAG VISIT (OUTPATIENT)
Dept: CARDIOLOGY CLINIC | Facility: CLINIC | Age: 60
End: 2020-10-21
Payer: MEDICARE

## 2020-10-21 ENCOUNTER — ANESTHESIA (OUTPATIENT)
Dept: PERIOP | Facility: HOSPITAL | Age: 60
End: 2020-10-21

## 2020-10-21 ENCOUNTER — TELEPHONE (OUTPATIENT)
Dept: GASTROENTEROLOGY | Facility: CLINIC | Age: 60
End: 2020-10-21

## 2020-10-21 ENCOUNTER — HOSPITAL ENCOUNTER (OUTPATIENT)
Dept: PERIOP | Facility: HOSPITAL | Age: 60
Setting detail: OUTPATIENT SURGERY
Discharge: HOME | End: 2020-10-21
Attending: INTERNAL MEDICINE | Admitting: INTERNAL MEDICINE
Payer: MEDICARE

## 2020-10-21 VITALS
HEIGHT: 71 IN | SYSTOLIC BLOOD PRESSURE: 147 MMHG | DIASTOLIC BLOOD PRESSURE: 84 MMHG | OXYGEN SATURATION: 94 % | HEART RATE: 80 BPM | TEMPERATURE: 97.6 F | RESPIRATION RATE: 18 BRPM | BODY MASS INDEX: 44.1 KG/M2 | WEIGHT: 315 LBS

## 2020-10-21 VITALS — HEART RATE: 99 BPM

## 2020-10-21 DIAGNOSIS — Z79.01 LONG TERM (CURRENT) USE OF ANTICOAGULANTS: ICD-10-CM

## 2020-10-21 DIAGNOSIS — I48.21 PERMANENT ATRIAL FIBRILLATION (HCC): ICD-10-CM

## 2020-10-21 DIAGNOSIS — R18.8 CIRRHOSIS OF LIVER WITH ASCITES, UNSPECIFIED HEPATIC CIRRHOSIS TYPE (HCC): ICD-10-CM

## 2020-10-21 DIAGNOSIS — E11.69 TYPE 2 DIABETES MELLITUS WITH OTHER SPECIFIED COMPLICATION, WITHOUT LONG-TERM CURRENT USE OF INSULIN (HCC): Primary | ICD-10-CM

## 2020-10-21 DIAGNOSIS — K74.60 CIRRHOSIS OF LIVER WITH ASCITES, UNSPECIFIED HEPATIC CIRRHOSIS TYPE (HCC): ICD-10-CM

## 2020-10-21 LAB
ERYTHROCYTE [DISTWIDTH] IN BLOOD BY AUTOMATED COUNT: 14.3 % (ref 11.6–15.1)
GLUCOSE SERPL-MCNC: 114 MG/DL (ref 65–140)
HCT VFR BLD AUTO: 31.4 % (ref 36.5–49.3)
HGB BLD-MCNC: 9.9 G/DL (ref 12–17)
INR PPP: 1.24 (ref 0.84–1.19)
MCH RBC QN AUTO: 32.5 PG (ref 26.8–34.3)
MCHC RBC AUTO-ENTMCNC: 31.5 G/DL (ref 31.4–37.4)
MCV RBC AUTO: 103 FL (ref 82–98)
PLATELET # BLD AUTO: 136 THOUSANDS/UL (ref 149–390)
PMV BLD AUTO: 10.6 FL (ref 8.9–12.7)
PROTHROMBIN TIME: 15.4 SECONDS (ref 11.6–14.5)
RBC # BLD AUTO: 3.05 MILLION/UL (ref 3.88–5.62)
WBC # BLD AUTO: 6.52 THOUSAND/UL (ref 4.31–10.16)

## 2020-10-21 PROCEDURE — 85610 PROTHROMBIN TIME: CPT | Performed by: INTERNAL MEDICINE

## 2020-10-21 PROCEDURE — 88305 TISSUE EXAM BY PATHOLOGIST: CPT | Performed by: PATHOLOGY

## 2020-10-21 PROCEDURE — 45380 COLONOSCOPY AND BIOPSY: CPT | Performed by: INTERNAL MEDICINE

## 2020-10-21 PROCEDURE — 45388 COLONOSCOPY W/ABLATION: CPT | Performed by: INTERNAL MEDICINE

## 2020-10-21 PROCEDURE — 93793 ANTICOAG MGMT PT WARFARIN: CPT | Performed by: PHYSICIAN ASSISTANT

## 2020-10-21 PROCEDURE — 82948 REAGENT STRIP/BLOOD GLUCOSE: CPT

## 2020-10-21 PROCEDURE — 85027 COMPLETE CBC AUTOMATED: CPT | Performed by: INTERNAL MEDICINE

## 2020-10-21 PROCEDURE — 43239 EGD BIOPSY SINGLE/MULTIPLE: CPT | Performed by: INTERNAL MEDICINE

## 2020-10-21 RX ORDER — LIDOCAINE HYDROCHLORIDE 10 MG/ML
0.5 INJECTION, SOLUTION EPIDURAL; INFILTRATION; INTRACAUDAL; PERINEURAL ONCE AS NEEDED
Status: DISCONTINUED | OUTPATIENT
Start: 2020-10-21 | End: 2020-10-25 | Stop reason: HOSPADM

## 2020-10-21 RX ORDER — LIDOCAINE HYDROCHLORIDE 20 MG/ML
INJECTION, SOLUTION EPIDURAL; INFILTRATION; INTRACAUDAL; PERINEURAL AS NEEDED
Status: DISCONTINUED | OUTPATIENT
Start: 2020-10-21 | End: 2020-10-21

## 2020-10-21 RX ORDER — PROPOFOL 10 MG/ML
INJECTION, EMULSION INTRAVENOUS CONTINUOUS PRN
Status: DISCONTINUED | OUTPATIENT
Start: 2020-10-21 | End: 2020-10-21

## 2020-10-21 RX ORDER — PROPOFOL 10 MG/ML
INJECTION, EMULSION INTRAVENOUS AS NEEDED
Status: DISCONTINUED | OUTPATIENT
Start: 2020-10-21 | End: 2020-10-21

## 2020-10-21 RX ORDER — SODIUM CHLORIDE, SODIUM LACTATE, POTASSIUM CHLORIDE, CALCIUM CHLORIDE 600; 310; 30; 20 MG/100ML; MG/100ML; MG/100ML; MG/100ML
125 INJECTION, SOLUTION INTRAVENOUS CONTINUOUS
Status: DISCONTINUED | OUTPATIENT
Start: 2020-10-21 | End: 2020-10-21

## 2020-10-21 RX ADMIN — LIDOCAINE HYDROCHLORIDE 100 MG: 20 INJECTION, SOLUTION EPIDURAL; INFILTRATION; INTRACAUDAL; PERINEURAL at 08:10

## 2020-10-21 RX ADMIN — PROPOFOL 120 MCG/KG/MIN: 10 INJECTION, EMULSION INTRAVENOUS at 08:10

## 2020-10-21 RX ADMIN — PROPOFOL 100 MG: 10 INJECTION, EMULSION INTRAVENOUS at 08:10

## 2020-10-21 RX ADMIN — SODIUM CHLORIDE, SODIUM LACTATE, POTASSIUM CHLORIDE, AND CALCIUM CHLORIDE 125 ML/HR: .6; .31; .03; .02 INJECTION, SOLUTION INTRAVENOUS at 07:20

## 2020-10-22 ENCOUNTER — TELEPHONE (OUTPATIENT)
Dept: GASTROENTEROLOGY | Facility: CLINIC | Age: 60
End: 2020-10-22

## 2020-10-29 ENCOUNTER — ANTICOAG VISIT (OUTPATIENT)
Dept: CARDIOLOGY CLINIC | Facility: CLINIC | Age: 60
End: 2020-10-29
Payer: MEDICARE

## 2020-10-29 DIAGNOSIS — Z79.01 LONG TERM (CURRENT) USE OF ANTICOAGULANTS: ICD-10-CM

## 2020-10-29 DIAGNOSIS — I48.21 PERMANENT ATRIAL FIBRILLATION (HCC): ICD-10-CM

## 2020-10-29 LAB — SL AMB POCT INR: 1.7

## 2020-10-29 PROCEDURE — 93793 ANTICOAG MGMT PT WARFARIN: CPT | Performed by: PHYSICIAN ASSISTANT

## 2020-10-29 PROCEDURE — 85610 PROTHROMBIN TIME: CPT | Performed by: PHYSICIAN ASSISTANT

## 2020-11-05 ENCOUNTER — ANTICOAG VISIT (OUTPATIENT)
Dept: CARDIOLOGY CLINIC | Facility: CLINIC | Age: 60
End: 2020-11-05
Payer: MEDICARE

## 2020-11-05 DIAGNOSIS — G47.09 OTHER INSOMNIA: Primary | ICD-10-CM

## 2020-11-05 DIAGNOSIS — Z79.01 LONG TERM (CURRENT) USE OF ANTICOAGULANTS: ICD-10-CM

## 2020-11-05 DIAGNOSIS — I48.21 PERMANENT ATRIAL FIBRILLATION (HCC): ICD-10-CM

## 2020-11-05 LAB — SL AMB POCT INR: 4

## 2020-11-05 PROCEDURE — 93793 ANTICOAG MGMT PT WARFARIN: CPT | Performed by: PHYSICIAN ASSISTANT

## 2020-11-05 PROCEDURE — 85610 PROTHROMBIN TIME: CPT | Performed by: PHYSICIAN ASSISTANT

## 2020-11-05 RX ORDER — ZOLPIDEM TARTRATE 10 MG/1
10 TABLET ORAL AS NEEDED
Qty: 1 TABLET | Refills: 0 | Status: SHIPPED | OUTPATIENT
Start: 2020-11-05 | End: 2021-01-01 | Stop reason: HOSPADM

## 2020-11-11 DIAGNOSIS — I48.21 PERMANENT ATRIAL FIBRILLATION (HCC): ICD-10-CM

## 2020-11-11 DIAGNOSIS — I25.10 CORONARY ARTERY DISEASE INVOLVING NATIVE CORONARY ARTERY OF NATIVE HEART WITHOUT ANGINA PECTORIS: ICD-10-CM

## 2020-11-11 DIAGNOSIS — Z79.01 LONG TERM CURRENT USE OF ANTICOAGULANT: ICD-10-CM

## 2020-11-12 RX ORDER — FUROSEMIDE 40 MG/1
40 TABLET ORAL DAILY
Qty: 90 TABLET | Refills: 3 | OUTPATIENT
Start: 2020-11-12 | End: 2021-01-01 | Stop reason: HOSPADM

## 2020-11-12 RX ORDER — DIGOXIN 250 MCG
250 TABLET ORAL DAILY
Qty: 90 TABLET | Refills: 3 | OUTPATIENT
Start: 2020-11-12 | End: 2020-12-16 | Stop reason: SDUPTHER

## 2020-11-12 RX ORDER — WARFARIN SODIUM 5 MG/1
TABLET ORAL
Qty: 135 TABLET | Refills: 3 | Status: ON HOLD | OUTPATIENT
Start: 2020-11-12 | End: 2021-01-01 | Stop reason: SDUPTHER

## 2020-11-12 RX ORDER — CARVEDILOL 6.25 MG/1
6.25 TABLET ORAL 2 TIMES DAILY WITH MEALS
Qty: 180 TABLET | Refills: 3 | OUTPATIENT
Start: 2020-11-12 | End: 2021-01-01 | Stop reason: SDUPTHER

## 2020-11-19 ENCOUNTER — ANTICOAG VISIT (OUTPATIENT)
Dept: CARDIOLOGY CLINIC | Facility: CLINIC | Age: 60
End: 2020-11-19
Payer: MEDICARE

## 2020-11-19 DIAGNOSIS — Z79.01 LONG TERM (CURRENT) USE OF ANTICOAGULANTS: ICD-10-CM

## 2020-11-19 DIAGNOSIS — I48.21 PERMANENT ATRIAL FIBRILLATION (HCC): ICD-10-CM

## 2020-11-19 LAB — SL AMB POCT INR: 2.4

## 2020-11-19 PROCEDURE — 85610 PROTHROMBIN TIME: CPT | Performed by: PHYSICIAN ASSISTANT

## 2020-11-19 PROCEDURE — 93793 ANTICOAG MGMT PT WARFARIN: CPT | Performed by: PHYSICIAN ASSISTANT

## 2020-12-01 ENCOUNTER — OFFICE VISIT (OUTPATIENT)
Dept: GASTROENTEROLOGY | Facility: CLINIC | Age: 60
End: 2020-12-01
Payer: MEDICARE

## 2020-12-01 VITALS
SYSTOLIC BLOOD PRESSURE: 138 MMHG | DIASTOLIC BLOOD PRESSURE: 80 MMHG | WEIGHT: 315 LBS | HEART RATE: 120 BPM | TEMPERATURE: 98.5 F | BODY MASS INDEX: 44.1 KG/M2 | HEIGHT: 71 IN

## 2020-12-01 DIAGNOSIS — Z11.59 ENCOUNTER FOR SCREENING FOR OTHER VIRAL DISEASES: ICD-10-CM

## 2020-12-01 DIAGNOSIS — K74.69 OTHER CIRRHOSIS OF LIVER (HCC): Primary | ICD-10-CM

## 2020-12-01 DIAGNOSIS — E66.01 CLASS 3 SEVERE OBESITY DUE TO EXCESS CALORIES WITH SERIOUS COMORBIDITY AND BODY MASS INDEX (BMI) OF 45.0 TO 49.9 IN ADULT (HCC): ICD-10-CM

## 2020-12-01 DIAGNOSIS — K59.09 OTHER CONSTIPATION: ICD-10-CM

## 2020-12-01 PROCEDURE — 99214 OFFICE O/P EST MOD 30 MIN: CPT | Performed by: PHYSICIAN ASSISTANT

## 2020-12-02 ENCOUNTER — HOSPITAL ENCOUNTER (OUTPATIENT)
Dept: ULTRASOUND IMAGING | Facility: HOSPITAL | Age: 60
Discharge: HOME | End: 2020-12-02
Payer: MEDICARE

## 2020-12-02 DIAGNOSIS — K74.69 OTHER CIRRHOSIS OF LIVER (HCC): ICD-10-CM

## 2020-12-02 PROCEDURE — 76705 ECHO EXAM OF ABDOMEN: CPT

## 2020-12-16 ENCOUNTER — OFFICE VISIT (OUTPATIENT)
Dept: CARDIOLOGY CLINIC | Facility: CLINIC | Age: 60
End: 2020-12-16
Payer: MEDICARE

## 2020-12-16 ENCOUNTER — ANTICOAG VISIT (OUTPATIENT)
Dept: CARDIOLOGY CLINIC | Facility: CLINIC | Age: 60
End: 2020-12-16
Payer: MEDICARE

## 2020-12-16 VITALS
DIASTOLIC BLOOD PRESSURE: 80 MMHG | HEART RATE: 82 BPM | HEIGHT: 71 IN | BODY MASS INDEX: 44.1 KG/M2 | SYSTOLIC BLOOD PRESSURE: 130 MMHG | WEIGHT: 315 LBS

## 2020-12-16 DIAGNOSIS — G47.33 OSA (OBSTRUCTIVE SLEEP APNEA): Primary | ICD-10-CM

## 2020-12-16 DIAGNOSIS — Z79.01 LONG TERM (CURRENT) USE OF ANTICOAGULANTS: ICD-10-CM

## 2020-12-16 DIAGNOSIS — I48.11 LONGSTANDING PERSISTENT ATRIAL FIBRILLATION (HCC): Primary | ICD-10-CM

## 2020-12-16 DIAGNOSIS — I50.32 CHRONIC DIASTOLIC CONGESTIVE HEART FAILURE (HCC): ICD-10-CM

## 2020-12-16 DIAGNOSIS — I48.21 PERMANENT ATRIAL FIBRILLATION (HCC): ICD-10-CM

## 2020-12-16 DIAGNOSIS — K74.69 OTHER CIRRHOSIS OF LIVER (HCC): ICD-10-CM

## 2020-12-16 DIAGNOSIS — I89.0 LYMPHEDEMA: ICD-10-CM

## 2020-12-16 LAB — SL AMB POCT INR: 3.3

## 2020-12-16 PROCEDURE — 85610 PROTHROMBIN TIME: CPT | Performed by: NURSE PRACTITIONER

## 2020-12-16 PROCEDURE — 99214 OFFICE O/P EST MOD 30 MIN: CPT | Performed by: INTERNAL MEDICINE

## 2020-12-16 RX ORDER — METOLAZONE 5 MG/1
TABLET ORAL
Qty: 15 TABLET | Refills: 5 | Status: SHIPPED | OUTPATIENT
Start: 2020-12-16 | End: 2021-01-01 | Stop reason: HOSPADM

## 2020-12-16 RX ORDER — DIGOXIN 250 MCG
250 TABLET ORAL DAILY
Qty: 30 TABLET | Refills: 5 | Status: SHIPPED | OUTPATIENT
Start: 2020-12-16 | End: 2021-01-01 | Stop reason: HOSPADM

## 2021-01-01 ENCOUNTER — HOSPITAL ENCOUNTER (OUTPATIENT)
Dept: INFUSION CENTER | Facility: HOSPITAL | Age: 61
Discharge: HOME | End: 2021-08-26
Attending: INTERNAL MEDICINE
Payer: COMMERCIAL

## 2021-01-01 ENCOUNTER — CONSULT (OUTPATIENT)
Dept: HEMATOLOGY ONCOLOGY | Facility: CLINIC | Age: 61
End: 2021-01-01
Payer: COMMERCIAL

## 2021-01-01 ENCOUNTER — TELEMEDICINE (OUTPATIENT)
Dept: CARDIAC SURGERY | Facility: CLINIC | Age: 61
End: 2021-01-01

## 2021-01-01 ENCOUNTER — TELEPHONE (OUTPATIENT)
Dept: PULMONOLOGY | Facility: CLINIC | Age: 61
End: 2021-01-01

## 2021-01-01 ENCOUNTER — ANESTHESIA EVENT (INPATIENT)
Dept: PERIOP | Facility: HOSPITAL | Age: 61
DRG: 163 | End: 2021-01-01
Payer: COMMERCIAL

## 2021-01-01 ENCOUNTER — TELEPHONE (OUTPATIENT)
Dept: FAMILY MEDICINE CLINIC | Facility: CLINIC | Age: 61
End: 2021-01-01

## 2021-01-01 ENCOUNTER — ANTICOAG VISIT (OUTPATIENT)
Dept: CARDIOLOGY CLINIC | Facility: CLINIC | Age: 61
End: 2021-01-01
Payer: COMMERCIAL

## 2021-01-01 ENCOUNTER — TRANSITIONAL CARE MANAGEMENT (OUTPATIENT)
Dept: FAMILY MEDICINE CLINIC | Facility: CLINIC | Age: 61
End: 2021-01-01

## 2021-01-01 ENCOUNTER — TELEPHONE (OUTPATIENT)
Dept: HEMATOLOGY ONCOLOGY | Facility: CLINIC | Age: 61
End: 2021-01-01

## 2021-01-01 ENCOUNTER — APPOINTMENT (INPATIENT)
Dept: INTERVENTIONAL RADIOLOGY/VASCULAR | Facility: HOSPITAL | Age: 61
DRG: 291 | End: 2021-01-01
Payer: COMMERCIAL

## 2021-01-01 ENCOUNTER — OFFICE VISIT (OUTPATIENT)
Dept: CARDIOLOGY CLINIC | Facility: CLINIC | Age: 61
End: 2021-01-01
Payer: COMMERCIAL

## 2021-01-01 ENCOUNTER — OFFICE VISIT (OUTPATIENT)
Dept: SLEEP CENTER | Facility: CLINIC | Age: 61
End: 2021-01-01
Payer: COMMERCIAL

## 2021-01-01 ENCOUNTER — TELEPHONE (OUTPATIENT)
Dept: CARDIAC SURGERY | Facility: CLINIC | Age: 61
End: 2021-01-01

## 2021-01-01 ENCOUNTER — OFFICE VISIT (OUTPATIENT)
Dept: FAMILY MEDICINE CLINIC | Facility: CLINIC | Age: 61
End: 2021-01-01
Payer: COMMERCIAL

## 2021-01-01 ENCOUNTER — APPOINTMENT (OUTPATIENT)
Dept: LAB | Facility: HOSPITAL | Age: 61
End: 2021-01-01
Attending: INTERNAL MEDICINE
Payer: COMMERCIAL

## 2021-01-01 ENCOUNTER — APPOINTMENT (INPATIENT)
Dept: RADIOLOGY | Facility: HOSPITAL | Age: 61
DRG: 186 | End: 2021-01-01
Payer: COMMERCIAL

## 2021-01-01 ENCOUNTER — APPOINTMENT (INPATIENT)
Dept: RADIOLOGY | Facility: HOSPITAL | Age: 61
DRG: 163 | End: 2021-01-01
Payer: COMMERCIAL

## 2021-01-01 ENCOUNTER — APPOINTMENT (EMERGENCY)
Dept: RADIOLOGY | Facility: HOSPITAL | Age: 61
End: 2021-01-01
Payer: COMMERCIAL

## 2021-01-01 ENCOUNTER — HOSPITAL ENCOUNTER (OUTPATIENT)
Dept: RADIOLOGY | Facility: HOSPITAL | Age: 61
Discharge: HOME | End: 2021-06-29
Attending: FAMILY MEDICINE
Payer: COMMERCIAL

## 2021-01-01 ENCOUNTER — RA CDI HCC (OUTPATIENT)
Dept: OTHER | Facility: HOSPITAL | Age: 61
End: 2021-01-01

## 2021-01-01 ENCOUNTER — TELEPHONE (OUTPATIENT)
Dept: GASTROENTEROLOGY | Facility: CLINIC | Age: 61
End: 2021-01-01

## 2021-01-01 ENCOUNTER — ANTICOAG VISIT (OUTPATIENT)
Dept: CARDIOLOGY CLINIC | Facility: CLINIC | Age: 61
End: 2021-01-01

## 2021-01-01 ENCOUNTER — PATIENT OUTREACH (OUTPATIENT)
Dept: FAMILY MEDICINE CLINIC | Facility: CLINIC | Age: 61
End: 2021-01-01

## 2021-01-01 ENCOUNTER — HOSPITAL ENCOUNTER (EMERGENCY)
Facility: HOSPITAL | Age: 61
End: 2021-12-04
Attending: EMERGENCY MEDICINE | Admitting: EMERGENCY MEDICINE
Payer: COMMERCIAL

## 2021-01-01 ENCOUNTER — VBI (OUTPATIENT)
Dept: ADMINISTRATIVE | Facility: OTHER | Age: 61
End: 2021-01-01

## 2021-01-01 ENCOUNTER — APPOINTMENT (EMERGENCY)
Dept: CT IMAGING | Facility: HOSPITAL | Age: 61
End: 2021-01-01
Payer: COMMERCIAL

## 2021-01-01 ENCOUNTER — APPOINTMENT (OUTPATIENT)
Dept: LAB | Facility: HOSPITAL | Age: 61
End: 2021-01-01
Attending: FAMILY MEDICINE
Payer: COMMERCIAL

## 2021-01-01 ENCOUNTER — TELEPHONE (OUTPATIENT)
Dept: INTERVENTIONAL RADIOLOGY/VASCULAR | Facility: HOSPITAL | Age: 61
End: 2021-01-01

## 2021-01-01 ENCOUNTER — APPOINTMENT (INPATIENT)
Dept: CT IMAGING | Facility: HOSPITAL | Age: 61
DRG: 186 | End: 2021-01-01
Payer: COMMERCIAL

## 2021-01-01 ENCOUNTER — HOSPITAL ENCOUNTER (OUTPATIENT)
Dept: INFUSION CENTER | Facility: HOSPITAL | Age: 61
Discharge: HOME | End: 2021-09-02
Attending: INTERNAL MEDICINE
Payer: COMMERCIAL

## 2021-01-01 ENCOUNTER — APPOINTMENT (INPATIENT)
Dept: ULTRASOUND IMAGING | Facility: HOSPITAL | Age: 61
DRG: 291 | End: 2021-01-01
Payer: COMMERCIAL

## 2021-01-01 ENCOUNTER — HOSPITAL ENCOUNTER (OUTPATIENT)
Dept: ULTRASOUND IMAGING | Facility: HOSPITAL | Age: 61
Discharge: HOME | End: 2021-08-16
Payer: COMMERCIAL

## 2021-01-01 ENCOUNTER — APPOINTMENT (INPATIENT)
Dept: RADIOLOGY | Facility: HOSPITAL | Age: 61
DRG: 291 | End: 2021-01-01
Payer: COMMERCIAL

## 2021-01-01 ENCOUNTER — TELEPHONE (OUTPATIENT)
Dept: NEPHROLOGY | Facility: CLINIC | Age: 61
End: 2021-01-01

## 2021-01-01 ENCOUNTER — APPOINTMENT (INPATIENT)
Dept: RADIOLOGY | Facility: HOSPITAL | Age: 61
DRG: 981 | End: 2021-01-01
Attending: INTERNAL MEDICINE
Payer: COMMERCIAL

## 2021-01-01 ENCOUNTER — HOSPITAL ENCOUNTER (EMERGENCY)
Facility: HOSPITAL | Age: 61
End: 2021-05-22
Attending: EMERGENCY MEDICINE | Admitting: EMERGENCY MEDICINE
Payer: COMMERCIAL

## 2021-01-01 ENCOUNTER — TELEPHONE (OUTPATIENT)
Dept: CARDIOLOGY CLINIC | Facility: CLINIC | Age: 61
End: 2021-01-01

## 2021-01-01 ENCOUNTER — OFFICE VISIT (OUTPATIENT)
Dept: NEPHROLOGY | Facility: CLINIC | Age: 61
End: 2021-01-01
Payer: COMMERCIAL

## 2021-01-01 ENCOUNTER — HOSPITAL ENCOUNTER (OUTPATIENT)
Dept: RADIOLOGY | Facility: HOSPITAL | Age: 61
Discharge: HOME | End: 2021-08-16
Payer: COMMERCIAL

## 2021-01-01 ENCOUNTER — OFFICE VISIT (OUTPATIENT)
Dept: GASTROENTEROLOGY | Facility: CLINIC | Age: 61
End: 2021-01-01
Payer: COMMERCIAL

## 2021-01-01 ENCOUNTER — APPOINTMENT (EMERGENCY)
Dept: CT IMAGING | Facility: HOSPITAL | Age: 61
DRG: 871 | End: 2021-01-01
Payer: COMMERCIAL

## 2021-01-01 ENCOUNTER — APPOINTMENT (INPATIENT)
Dept: ULTRASOUND IMAGING | Facility: HOSPITAL | Age: 61
DRG: 871 | End: 2021-01-01
Payer: COMMERCIAL

## 2021-01-01 ENCOUNTER — APPOINTMENT (INPATIENT)
Dept: RADIOLOGY | Facility: HOSPITAL | Age: 61
DRG: 871 | End: 2021-01-01
Payer: COMMERCIAL

## 2021-01-01 ENCOUNTER — HOSPITAL ENCOUNTER (EMERGENCY)
Facility: HOSPITAL | Age: 61
Discharge: HOME | End: 2021-11-10
Attending: EMERGENCY MEDICINE
Payer: COMMERCIAL

## 2021-01-01 ENCOUNTER — APPOINTMENT (OUTPATIENT)
Dept: LAB | Facility: HOSPITAL | Age: 61
End: 2021-01-01
Payer: COMMERCIAL

## 2021-01-01 ENCOUNTER — HOSPITAL ENCOUNTER (INPATIENT)
Facility: HOSPITAL | Age: 61
LOS: 11 days | Discharge: HOME | DRG: 871 | End: 2021-09-24
Attending: EMERGENCY MEDICINE | Admitting: INTERNAL MEDICINE
Payer: COMMERCIAL

## 2021-01-01 ENCOUNTER — HOSPITAL ENCOUNTER (INPATIENT)
Facility: HOSPITAL | Age: 61
LOS: 14 days | Discharge: HOME | DRG: 163 | End: 2021-06-09
Attending: FAMILY MEDICINE | Admitting: INTERNAL MEDICINE
Payer: COMMERCIAL

## 2021-01-01 ENCOUNTER — HOSPITAL ENCOUNTER (OUTPATIENT)
Dept: INFUSION CENTER | Facility: HOSPITAL | Age: 61
Discharge: HOME | End: 2021-12-30
Payer: COMMERCIAL

## 2021-01-01 ENCOUNTER — TELEPHONE (OUTPATIENT)
Dept: SURGICAL ONCOLOGY | Facility: CLINIC | Age: 61
End: 2021-01-01

## 2021-01-01 ENCOUNTER — ANESTHESIA (INPATIENT)
Dept: RADIOLOGY | Facility: HOSPITAL | Age: 61
DRG: 981 | End: 2021-01-01
Payer: COMMERCIAL

## 2021-01-01 ENCOUNTER — ANESTHESIA (INPATIENT)
Dept: PERIOP | Facility: HOSPITAL | Age: 61
DRG: 163 | End: 2021-01-01
Payer: COMMERCIAL

## 2021-01-01 ENCOUNTER — TELEPHONE (OUTPATIENT)
Dept: GASTROENTEROLOGY | Facility: AMBULARY SURGERY CENTER | Age: 61
End: 2021-01-01

## 2021-01-01 ENCOUNTER — DOCUMENTATION (OUTPATIENT)
Dept: PULMONOLOGY | Facility: CLINIC | Age: 61
End: 2021-01-01

## 2021-01-01 ENCOUNTER — TELEPHONE (OUTPATIENT)
Dept: CARDIAC REHAB | Facility: HOSPITAL | Age: 61
End: 2021-01-01

## 2021-01-01 ENCOUNTER — TELEMEDICINE (OUTPATIENT)
Dept: CARDIAC SURGERY | Facility: CLINIC | Age: 61
End: 2021-01-01
Payer: COMMERCIAL

## 2021-01-01 ENCOUNTER — ANESTHESIA EVENT (INPATIENT)
Dept: RADIOLOGY | Facility: HOSPITAL | Age: 61
DRG: 981 | End: 2021-01-01
Payer: COMMERCIAL

## 2021-01-01 ENCOUNTER — HOSPITAL ENCOUNTER (INPATIENT)
Facility: HOSPITAL | Age: 61
LOS: 10 days | Discharge: NURSING, REHAB OR LONG-TERM CARE | DRG: 981 | End: 2021-12-14
Attending: SURGERY | Admitting: SURGERY
Payer: COMMERCIAL

## 2021-01-01 ENCOUNTER — HOSPITAL ENCOUNTER (INPATIENT)
Facility: HOSPITAL | Age: 61
LOS: 4 days | DRG: 186 | End: 2021-05-26
Attending: STUDENT IN AN ORGANIZED HEALTH CARE EDUCATION/TRAINING PROGRAM | Admitting: STUDENT IN AN ORGANIZED HEALTH CARE EDUCATION/TRAINING PROGRAM
Payer: COMMERCIAL

## 2021-01-01 ENCOUNTER — OFFICE VISIT (OUTPATIENT)
Dept: CARDIAC SURGERY | Facility: CLINIC | Age: 61
End: 2021-01-01

## 2021-01-01 ENCOUNTER — TELEPHONE (OUTPATIENT)
Dept: ADMINISTRATIVE | Facility: OTHER | Age: 61
End: 2021-01-01

## 2021-01-01 ENCOUNTER — TELEPHONE (OUTPATIENT)
Dept: SLEEP CENTER | Facility: CLINIC | Age: 61
End: 2021-01-01

## 2021-01-01 VITALS
OXYGEN SATURATION: 97 % | WEIGHT: 315 LBS | SYSTOLIC BLOOD PRESSURE: 100 MMHG | DIASTOLIC BLOOD PRESSURE: 65 MMHG | BODY MASS INDEX: 49.39 KG/M2 | RESPIRATION RATE: 16 BRPM | HEART RATE: 117 BPM | TEMPERATURE: 98 F

## 2021-01-01 VITALS
BODY MASS INDEX: 25.73 KG/M2 | HEIGHT: 73 IN | RESPIRATION RATE: 16 BRPM | HEART RATE: 140 BPM | TEMPERATURE: 97.7 F | OXYGEN SATURATION: 98 % | DIASTOLIC BLOOD PRESSURE: 77 MMHG | SYSTOLIC BLOOD PRESSURE: 126 MMHG

## 2021-01-01 VITALS
SYSTOLIC BLOOD PRESSURE: 95 MMHG | TEMPERATURE: 98.5 F | WEIGHT: 315 LBS | DIASTOLIC BLOOD PRESSURE: 67 MMHG | BODY MASS INDEX: 42.66 KG/M2 | HEART RATE: 137 BPM | HEIGHT: 72 IN

## 2021-01-01 VITALS
BODY MASS INDEX: 40.42 KG/M2 | TEMPERATURE: 97.3 F | HEIGHT: 73 IN | HEART RATE: 100 BPM | DIASTOLIC BLOOD PRESSURE: 68 MMHG | WEIGHT: 305 LBS | SYSTOLIC BLOOD PRESSURE: 100 MMHG

## 2021-01-01 VITALS
SYSTOLIC BLOOD PRESSURE: 103 MMHG | HEART RATE: 119 BPM | WEIGHT: 288.8 LBS | TEMPERATURE: 97.3 F | OXYGEN SATURATION: 100 % | RESPIRATION RATE: 18 BRPM | DIASTOLIC BLOOD PRESSURE: 61 MMHG | BODY MASS INDEX: 40.43 KG/M2 | HEIGHT: 71 IN

## 2021-01-01 VITALS
HEIGHT: 71 IN | WEIGHT: 298.94 LBS | RESPIRATION RATE: 18 BRPM | BODY MASS INDEX: 41.85 KG/M2 | OXYGEN SATURATION: 97 % | TEMPERATURE: 98.1 F | HEART RATE: 65 BPM | DIASTOLIC BLOOD PRESSURE: 54 MMHG | SYSTOLIC BLOOD PRESSURE: 103 MMHG

## 2021-01-01 VITALS
HEART RATE: 107 BPM | OXYGEN SATURATION: 95 % | SYSTOLIC BLOOD PRESSURE: 121 MMHG | WEIGHT: 315 LBS | HEIGHT: 72 IN | BODY MASS INDEX: 42.66 KG/M2 | DIASTOLIC BLOOD PRESSURE: 76 MMHG | TEMPERATURE: 96.7 F | RESPIRATION RATE: 16 BRPM

## 2021-01-01 VITALS
BODY MASS INDEX: 42.28 KG/M2 | HEIGHT: 71 IN | WEIGHT: 302 LBS | OXYGEN SATURATION: 100 % | DIASTOLIC BLOOD PRESSURE: 50 MMHG | SYSTOLIC BLOOD PRESSURE: 80 MMHG | TEMPERATURE: 97.3 F | HEART RATE: 104 BPM

## 2021-01-01 VITALS
TEMPERATURE: 98.5 F | HEART RATE: 84 BPM | SYSTOLIC BLOOD PRESSURE: 130 MMHG | BODY MASS INDEX: 40.56 KG/M2 | RESPIRATION RATE: 20 BRPM | DIASTOLIC BLOOD PRESSURE: 66 MMHG | WEIGHT: 306 LBS | HEIGHT: 73 IN

## 2021-01-01 VITALS
BODY MASS INDEX: 40.1 KG/M2 | TEMPERATURE: 98.3 F | DIASTOLIC BLOOD PRESSURE: 91 MMHG | RESPIRATION RATE: 20 BRPM | SYSTOLIC BLOOD PRESSURE: 120 MMHG | HEART RATE: 101 BPM | OXYGEN SATURATION: 99 % | WEIGHT: 296.08 LBS | HEIGHT: 72 IN

## 2021-01-01 VITALS
WEIGHT: 297 LBS | DIASTOLIC BLOOD PRESSURE: 80 MMHG | HEIGHT: 71 IN | OXYGEN SATURATION: 99 % | BODY MASS INDEX: 41.58 KG/M2 | SYSTOLIC BLOOD PRESSURE: 140 MMHG | HEART RATE: 75 BPM

## 2021-01-01 VITALS
HEART RATE: 118 BPM | OXYGEN SATURATION: 98 % | SYSTOLIC BLOOD PRESSURE: 98 MMHG | BODY MASS INDEX: 42.66 KG/M2 | WEIGHT: 315 LBS | DIASTOLIC BLOOD PRESSURE: 64 MMHG | HEIGHT: 72 IN

## 2021-01-01 VITALS
SYSTOLIC BLOOD PRESSURE: 106 MMHG | HEART RATE: 101 BPM | TEMPERATURE: 98.5 F | HEIGHT: 71 IN | DIASTOLIC BLOOD PRESSURE: 63 MMHG | WEIGHT: 279.6 LBS | BODY MASS INDEX: 39.14 KG/M2

## 2021-01-01 VITALS
HEART RATE: 80 BPM | SYSTOLIC BLOOD PRESSURE: 120 MMHG | DIASTOLIC BLOOD PRESSURE: 80 MMHG | HEIGHT: 72 IN | BODY MASS INDEX: 42.66 KG/M2 | WEIGHT: 315 LBS

## 2021-01-01 VITALS
RESPIRATION RATE: 16 BRPM | HEART RATE: 84 BPM | TEMPERATURE: 97.9 F | DIASTOLIC BLOOD PRESSURE: 56 MMHG | OXYGEN SATURATION: 97 % | SYSTOLIC BLOOD PRESSURE: 110 MMHG

## 2021-01-01 VITALS
HEIGHT: 72 IN | DIASTOLIC BLOOD PRESSURE: 70 MMHG | WEIGHT: 315 LBS | SYSTOLIC BLOOD PRESSURE: 100 MMHG | HEART RATE: 98 BPM | BODY MASS INDEX: 42.66 KG/M2

## 2021-01-01 VITALS
BODY MASS INDEX: 40.74 KG/M2 | HEIGHT: 71 IN | WEIGHT: 291 LBS | HEART RATE: 84 BPM | DIASTOLIC BLOOD PRESSURE: 74 MMHG | SYSTOLIC BLOOD PRESSURE: 132 MMHG | RESPIRATION RATE: 20 BRPM | TEMPERATURE: 97.4 F

## 2021-01-01 VITALS
SYSTOLIC BLOOD PRESSURE: 132 MMHG | HEART RATE: 98 BPM | HEIGHT: 72 IN | BODY MASS INDEX: 26.41 KG/M2 | DIASTOLIC BLOOD PRESSURE: 88 MMHG | WEIGHT: 195 LBS

## 2021-01-01 VITALS
OXYGEN SATURATION: 99 % | SYSTOLIC BLOOD PRESSURE: 101 MMHG | TEMPERATURE: 98.6 F | HEART RATE: 105 BPM | RESPIRATION RATE: 20 BRPM | DIASTOLIC BLOOD PRESSURE: 56 MMHG

## 2021-01-01 VITALS
DIASTOLIC BLOOD PRESSURE: 57 MMHG | HEART RATE: 120 BPM | TEMPERATURE: 97.8 F | RESPIRATION RATE: 16 BRPM | SYSTOLIC BLOOD PRESSURE: 116 MMHG

## 2021-01-01 VITALS
DIASTOLIC BLOOD PRESSURE: 50 MMHG | SYSTOLIC BLOOD PRESSURE: 95 MMHG | TEMPERATURE: 96.3 F | HEART RATE: 79 BPM | RESPIRATION RATE: 16 BRPM

## 2021-01-01 VITALS
HEART RATE: 130 BPM | OXYGEN SATURATION: 97 % | BODY MASS INDEX: 40.16 KG/M2 | WEIGHT: 303 LBS | DIASTOLIC BLOOD PRESSURE: 76 MMHG | HEIGHT: 73 IN | SYSTOLIC BLOOD PRESSURE: 118 MMHG

## 2021-01-01 VITALS
HEART RATE: 115 BPM | BODY MASS INDEX: 42.01 KG/M2 | DIASTOLIC BLOOD PRESSURE: 61 MMHG | SYSTOLIC BLOOD PRESSURE: 122 MMHG | OXYGEN SATURATION: 100 % | RESPIRATION RATE: 29 BRPM | TEMPERATURE: 97.9 F | WEIGHT: 300.05 LBS | HEIGHT: 71 IN

## 2021-01-01 VITALS
OXYGEN SATURATION: 99 % | HEART RATE: 92 BPM | SYSTOLIC BLOOD PRESSURE: 134 MMHG | DIASTOLIC BLOOD PRESSURE: 84 MMHG | TEMPERATURE: 97.4 F | RESPIRATION RATE: 20 BRPM | BODY MASS INDEX: 42.66 KG/M2 | HEIGHT: 72 IN | WEIGHT: 315 LBS

## 2021-01-01 VITALS
DIASTOLIC BLOOD PRESSURE: 90 MMHG | HEART RATE: 75 BPM | RESPIRATION RATE: 19 BRPM | WEIGHT: 315 LBS | TEMPERATURE: 98.1 F | OXYGEN SATURATION: 93 % | SYSTOLIC BLOOD PRESSURE: 121 MMHG | HEIGHT: 72 IN | BODY MASS INDEX: 42.66 KG/M2

## 2021-01-01 VITALS
DIASTOLIC BLOOD PRESSURE: 70 MMHG | HEART RATE: 90 BPM | HEIGHT: 71 IN | WEIGHT: 287 LBS | SYSTOLIC BLOOD PRESSURE: 110 MMHG | BODY MASS INDEX: 40.18 KG/M2

## 2021-01-01 DIAGNOSIS — Z72.0 TOBACCO USE: ICD-10-CM

## 2021-01-01 DIAGNOSIS — E11.69 TYPE 2 DIABETES MELLITUS WITH OTHER SPECIFIED COMPLICATION, WITHOUT LONG-TERM CURRENT USE OF INSULIN (HCC): ICD-10-CM

## 2021-01-01 DIAGNOSIS — N18.9 CHRONIC KIDNEY DISEASE-MINERAL AND BONE DISORDER: ICD-10-CM

## 2021-01-01 DIAGNOSIS — I95.9 HYPOTENSION: ICD-10-CM

## 2021-01-01 DIAGNOSIS — I50.32 CHRONIC DIASTOLIC CHF (CONGESTIVE HEART FAILURE) (HCC): ICD-10-CM

## 2021-01-01 DIAGNOSIS — I48.21 PERMANENT ATRIAL FIBRILLATION (HCC): ICD-10-CM

## 2021-01-01 DIAGNOSIS — Z79.01 LONG TERM (CURRENT) USE OF ANTICOAGULANTS: ICD-10-CM

## 2021-01-01 DIAGNOSIS — D64.9 ANEMIA, UNSPECIFIED TYPE: ICD-10-CM

## 2021-01-01 DIAGNOSIS — I89.0 LYMPHEDEMA: ICD-10-CM

## 2021-01-01 DIAGNOSIS — D50.9 IRON DEFICIENCY ANEMIA, UNSPECIFIED IRON DEFICIENCY ANEMIA TYPE: Primary | ICD-10-CM

## 2021-01-01 DIAGNOSIS — K74.60 CIRRHOSIS OF LIVER WITHOUT ASCITES, UNSPECIFIED HEPATIC CIRRHOSIS TYPE (HCC): ICD-10-CM

## 2021-01-01 DIAGNOSIS — N18.32 ACUTE RENAL FAILURE SUPERIMPOSED ON STAGE 3B CHRONIC KIDNEY DISEASE, UNSPECIFIED ACUTE RENAL FAILURE TYPE (HCC): ICD-10-CM

## 2021-01-01 DIAGNOSIS — J90 PLEURAL EFFUSION ON RIGHT: ICD-10-CM

## 2021-01-01 DIAGNOSIS — E83.9 CHRONIC KIDNEY DISEASE-MINERAL AND BONE DISORDER: Primary | ICD-10-CM

## 2021-01-01 DIAGNOSIS — J90 PLEURAL EFFUSION, RIGHT: Primary | ICD-10-CM

## 2021-01-01 DIAGNOSIS — M25.531 RIGHT WRIST PAIN: ICD-10-CM

## 2021-01-01 DIAGNOSIS — N17.9 AKI (ACUTE KIDNEY INJURY) (HCC): Primary | ICD-10-CM

## 2021-01-01 DIAGNOSIS — Z79.01 CURRENT USE OF LONG TERM ANTICOAGULATION: Primary | ICD-10-CM

## 2021-01-01 DIAGNOSIS — R06.02 SOB (SHORTNESS OF BREATH): ICD-10-CM

## 2021-01-01 DIAGNOSIS — Z09 S/P LUNG SURGERY, FOLLOW-UP EXAM: Primary | ICD-10-CM

## 2021-01-01 DIAGNOSIS — N18.9 CHRONIC KIDNEY DISEASE-MINERAL AND BONE DISORDER: Primary | ICD-10-CM

## 2021-01-01 DIAGNOSIS — D50.9 IRON DEFICIENCY ANEMIA, UNSPECIFIED IRON DEFICIENCY ANEMIA TYPE: ICD-10-CM

## 2021-01-01 DIAGNOSIS — E66.01 MORBID OBESITY WITH BMI OF 40.0-44.9, ADULT (HCC): ICD-10-CM

## 2021-01-01 DIAGNOSIS — R18.8 CIRRHOSIS OF LIVER WITH ASCITES, UNSPECIFIED HEPATIC CIRRHOSIS TYPE (HCC): ICD-10-CM

## 2021-01-01 DIAGNOSIS — K74.60 HEPATIC CIRRHOSIS, UNSPECIFIED HEPATIC CIRRHOSIS TYPE, UNSPECIFIED WHETHER ASCITES PRESENT (HCC): ICD-10-CM

## 2021-01-01 DIAGNOSIS — E87.6 HYPOKALEMIA: ICD-10-CM

## 2021-01-01 DIAGNOSIS — D69.6 THROMBOCYTOPENIA (HCC): ICD-10-CM

## 2021-01-01 DIAGNOSIS — N17.9 ACUTE RENAL FAILURE SUPERIMPOSED ON STAGE 3B CHRONIC KIDNEY DISEASE, UNSPECIFIED ACUTE RENAL FAILURE TYPE (HCC): ICD-10-CM

## 2021-01-01 DIAGNOSIS — R18.8 CIRRHOSIS OF LIVER WITH ASCITES, UNSPECIFIED HEPATIC CIRRHOSIS TYPE (HCC): Primary | ICD-10-CM

## 2021-01-01 DIAGNOSIS — I50.32 CHRONIC DIASTOLIC CONGESTIVE HEART FAILURE (HCC): Primary | ICD-10-CM

## 2021-01-01 DIAGNOSIS — I13.0 HYPERTENSIVE HEART AND CHRONIC KIDNEY DISEASE WITH HEART FAILURE AND STAGE 1 THROUGH STAGE 4 CHRONIC KIDNEY DISEASE, OR CHRONIC KIDNEY DISEASE (HCC): ICD-10-CM

## 2021-01-01 DIAGNOSIS — Z79.01 LONG TERM CURRENT USE OF ANTICOAGULANT: ICD-10-CM

## 2021-01-01 DIAGNOSIS — K74.69 OTHER CIRRHOSIS OF LIVER (HCC): ICD-10-CM

## 2021-01-01 DIAGNOSIS — N18.30 STAGE 3 CHRONIC KIDNEY DISEASE, UNSPECIFIED WHETHER STAGE 3A OR 3B CKD (HCC): ICD-10-CM

## 2021-01-01 DIAGNOSIS — N18.9 ACUTE KIDNEY INJURY SUPERIMPOSED ON CKD (HCC): ICD-10-CM

## 2021-01-01 DIAGNOSIS — Z72.0 TOBACCO ABUSE: ICD-10-CM

## 2021-01-01 DIAGNOSIS — K74.60 CIRRHOSIS OF LIVER WITH ASCITES, UNSPECIFIED HEPATIC CIRRHOSIS TYPE (HCC): Primary | ICD-10-CM

## 2021-01-01 DIAGNOSIS — K74.60 CIRRHOSIS OF LIVER WITH ASCITES, UNSPECIFIED HEPATIC CIRRHOSIS TYPE (HCC): ICD-10-CM

## 2021-01-01 DIAGNOSIS — E66.01 CLASS 3 SEVERE OBESITY DUE TO EXCESS CALORIES WITH SERIOUS COMORBIDITY AND BODY MASS INDEX (BMI) OF 45.0 TO 49.9 IN ADULT (HCC): ICD-10-CM

## 2021-01-01 DIAGNOSIS — J90 RECURRENT RIGHT PLEURAL EFFUSION: ICD-10-CM

## 2021-01-01 DIAGNOSIS — I48.19 PERSISTENT ATRIAL FIBRILLATION (HCC): ICD-10-CM

## 2021-01-01 DIAGNOSIS — M79.3 PANNICULITIS: ICD-10-CM

## 2021-01-01 DIAGNOSIS — A41.9 SEPTIC SHOCK (HCC): Primary | ICD-10-CM

## 2021-01-01 DIAGNOSIS — E61.1 IRON DEFICIENCY: ICD-10-CM

## 2021-01-01 DIAGNOSIS — D64.9 CHRONIC ANEMIA: ICD-10-CM

## 2021-01-01 DIAGNOSIS — E61.1 IRON DEFICIENCY: Primary | ICD-10-CM

## 2021-01-01 DIAGNOSIS — D69.6 THROMBOCYTOPENIA, UNSPECIFIED (HCC): ICD-10-CM

## 2021-01-01 DIAGNOSIS — S30.1XXA ABDOMINAL WALL HEMATOMA, INITIAL ENCOUNTER: Primary | ICD-10-CM

## 2021-01-01 DIAGNOSIS — I48.21 PERMANENT ATRIAL FIBRILLATION (HCC): Primary | ICD-10-CM

## 2021-01-01 DIAGNOSIS — R74.8 ELEVATED ALKALINE PHOSPHATASE LEVEL: ICD-10-CM

## 2021-01-01 DIAGNOSIS — I50.33 ACUTE ON CHRONIC DIASTOLIC CHF (CONGESTIVE HEART FAILURE) (HCC): ICD-10-CM

## 2021-01-01 DIAGNOSIS — E87.1 HYPONATREMIA: ICD-10-CM

## 2021-01-01 DIAGNOSIS — Z79.01 CURRENT USE OF LONG TERM ANTICOAGULATION: ICD-10-CM

## 2021-01-01 DIAGNOSIS — D50.0 IRON DEFICIENCY ANEMIA DUE TO CHRONIC BLOOD LOSS: ICD-10-CM

## 2021-01-01 DIAGNOSIS — N18.32 STAGE 3B CHRONIC KIDNEY DISEASE (HCC): ICD-10-CM

## 2021-01-01 DIAGNOSIS — J90 PLEURAL EFFUSION: Primary | ICD-10-CM

## 2021-01-01 DIAGNOSIS — D61.818 PANCYTOPENIA (HCC): ICD-10-CM

## 2021-01-01 DIAGNOSIS — R06.82 TACHYPNEA: ICD-10-CM

## 2021-01-01 DIAGNOSIS — J90 PLEURAL EFFUSION, RIGHT: ICD-10-CM

## 2021-01-01 DIAGNOSIS — I50.32 CHRONIC DIASTOLIC (CONGESTIVE) HEART FAILURE (HCC): ICD-10-CM

## 2021-01-01 DIAGNOSIS — K29.70 GASTRITIS WITHOUT BLEEDING, UNSPECIFIED CHRONICITY, UNSPECIFIED GASTRITIS TYPE: ICD-10-CM

## 2021-01-01 DIAGNOSIS — N17.9 ACUTE KIDNEY INJURY SUPERIMPOSED ON CHRONIC KIDNEY DISEASE (HCC): ICD-10-CM

## 2021-01-01 DIAGNOSIS — N18.9 ACUTE KIDNEY INJURY SUPERIMPOSED ON CHRONIC KIDNEY DISEASE (HCC): Primary | ICD-10-CM

## 2021-01-01 DIAGNOSIS — R79.1 SUPRATHERAPEUTIC INR: ICD-10-CM

## 2021-01-01 DIAGNOSIS — N18.9 ACUTE KIDNEY INJURY SUPERIMPOSED ON CHRONIC KIDNEY DISEASE (HCC): ICD-10-CM

## 2021-01-01 DIAGNOSIS — D70.9 NEUTROPENIA, UNSPECIFIED TYPE (HCC): ICD-10-CM

## 2021-01-01 DIAGNOSIS — I10 ESSENTIAL HYPERTENSION: ICD-10-CM

## 2021-01-01 DIAGNOSIS — E83.9 CHRONIC KIDNEY DISEASE-MINERAL AND BONE DISORDER: ICD-10-CM

## 2021-01-01 DIAGNOSIS — G47.33 OSA (OBSTRUCTIVE SLEEP APNEA): ICD-10-CM

## 2021-01-01 DIAGNOSIS — D64.9 ANEMIA, UNSPECIFIED TYPE: Primary | ICD-10-CM

## 2021-01-01 DIAGNOSIS — E83.52 HYPERCALCEMIA: ICD-10-CM

## 2021-01-01 DIAGNOSIS — E55.9 VITAMIN D DEFICIENCY: ICD-10-CM

## 2021-01-01 DIAGNOSIS — E11.69 TYPE 2 DIABETES MELLITUS WITH OTHER SPECIFIED COMPLICATION, WITHOUT LONG-TERM CURRENT USE OF INSULIN (HCC): Primary | ICD-10-CM

## 2021-01-01 DIAGNOSIS — N17.9 ACUTE KIDNEY INJURY SUPERIMPOSED ON CHRONIC KIDNEY DISEASE (HCC): Primary | ICD-10-CM

## 2021-01-01 DIAGNOSIS — M89.9 CHRONIC KIDNEY DISEASE-MINERAL AND BONE DISORDER: ICD-10-CM

## 2021-01-01 DIAGNOSIS — N17.9 ACUTE KIDNEY INJURY SUPERIMPOSED ON CKD (HCC): ICD-10-CM

## 2021-01-01 DIAGNOSIS — E87.6 HYPOKALEMIA: Primary | ICD-10-CM

## 2021-01-01 DIAGNOSIS — R52 PAIN: ICD-10-CM

## 2021-01-01 DIAGNOSIS — M89.9 CHRONIC KIDNEY DISEASE-MINERAL AND BONE DISORDER: Primary | ICD-10-CM

## 2021-01-01 DIAGNOSIS — R06.02 SHORTNESS OF BREATH: ICD-10-CM

## 2021-01-01 DIAGNOSIS — N18.31 STAGE 3A CHRONIC KIDNEY DISEASE (HCC): ICD-10-CM

## 2021-01-01 DIAGNOSIS — N28.9 RENAL INSUFFICIENCY: ICD-10-CM

## 2021-01-01 DIAGNOSIS — K74.60 CIRRHOSIS OF LIVER WITHOUT ASCITES, UNSPECIFIED HEPATIC CIRRHOSIS TYPE (HCC): Primary | ICD-10-CM

## 2021-01-01 DIAGNOSIS — I47.2 NSVT (NONSUSTAINED VENTRICULAR TACHYCARDIA) (HCC): ICD-10-CM

## 2021-01-01 DIAGNOSIS — N18.31 TYPE 2 DIABETES MELLITUS WITH STAGE 3A CHRONIC KIDNEY DISEASE, WITHOUT LONG-TERM CURRENT USE OF INSULIN (HCC): ICD-10-CM

## 2021-01-01 DIAGNOSIS — I48.20 CHRONIC ATRIAL FIBRILLATION (HCC): Primary | ICD-10-CM

## 2021-01-01 DIAGNOSIS — I48.11 LONGSTANDING PERSISTENT ATRIAL FIBRILLATION (HCC): Primary | ICD-10-CM

## 2021-01-01 DIAGNOSIS — R60.9 PERIPHERAL EDEMA: Primary | ICD-10-CM

## 2021-01-01 DIAGNOSIS — K20.90 ESOPHAGITIS: ICD-10-CM

## 2021-01-01 DIAGNOSIS — J96.01 ACUTE RESPIRATORY FAILURE WITH HYPOXIA (HCC): Primary | ICD-10-CM

## 2021-01-01 DIAGNOSIS — E11.65 TYPE 2 DIABETES MELLITUS WITH HYPERGLYCEMIA, WITHOUT LONG-TERM CURRENT USE OF INSULIN (HCC): ICD-10-CM

## 2021-01-01 DIAGNOSIS — J90 PLEURAL EFFUSION ON RIGHT: Primary | ICD-10-CM

## 2021-01-01 DIAGNOSIS — N18.31 STAGE 3A CHRONIC KIDNEY DISEASE (HCC): Primary | ICD-10-CM

## 2021-01-01 DIAGNOSIS — I27.20 PULMONARY HYPERTENSION (HCC): ICD-10-CM

## 2021-01-01 DIAGNOSIS — D64.9 ANEMIA: ICD-10-CM

## 2021-01-01 DIAGNOSIS — N50.89 SCROTAL EDEMA: ICD-10-CM

## 2021-01-01 DIAGNOSIS — G47.09 OTHER INSOMNIA: ICD-10-CM

## 2021-01-01 DIAGNOSIS — R52 PAIN: Primary | ICD-10-CM

## 2021-01-01 DIAGNOSIS — I25.10 CORONARY ARTERY DISEASE INVOLVING NATIVE CORONARY ARTERY OF NATIVE HEART WITHOUT ANGINA PECTORIS: ICD-10-CM

## 2021-01-01 DIAGNOSIS — E11.22 TYPE 2 DIABETES MELLITUS WITH STAGE 3A CHRONIC KIDNEY DISEASE, WITHOUT LONG-TERM CURRENT USE OF INSULIN (HCC): ICD-10-CM

## 2021-01-01 DIAGNOSIS — S30.1XXA ABDOMINAL WALL HEMATOMA, INITIAL ENCOUNTER: ICD-10-CM

## 2021-01-01 DIAGNOSIS — I50.32 CHRONIC DIASTOLIC CONGESTIVE HEART FAILURE (HCC): ICD-10-CM

## 2021-01-01 DIAGNOSIS — I50.30 DIASTOLIC CHF (HCC): ICD-10-CM

## 2021-01-01 DIAGNOSIS — Z09 S/P LUNG SURGERY, FOLLOW-UP EXAM: ICD-10-CM

## 2021-01-01 DIAGNOSIS — R65.21 SEPTIC SHOCK (HCC): Primary | ICD-10-CM

## 2021-01-01 DIAGNOSIS — I50.32 CHRONIC DIASTOLIC (CONGESTIVE) HEART FAILURE (HCC): Primary | ICD-10-CM

## 2021-01-01 DIAGNOSIS — A41.9 SEPSIS (HCC): ICD-10-CM

## 2021-01-01 DIAGNOSIS — R60.9 EDEMA: ICD-10-CM

## 2021-01-01 DIAGNOSIS — Z79.01 ANTICOAGULATED ON COUMADIN: ICD-10-CM

## 2021-01-01 DIAGNOSIS — G47.33 OSA (OBSTRUCTIVE SLEEP APNEA): Primary | ICD-10-CM

## 2021-01-01 DIAGNOSIS — N18.9 CHRONIC RENAL INSUFFICIENCY: ICD-10-CM

## 2021-01-01 DIAGNOSIS — D50.0 IRON DEFICIENCY ANEMIA DUE TO CHRONIC BLOOD LOSS: Primary | ICD-10-CM

## 2021-01-01 DIAGNOSIS — E11.22 TYPE 2 DIABETES MELLITUS WITH DIABETIC CHRONIC KIDNEY DISEASE (HCC): ICD-10-CM

## 2021-01-01 DIAGNOSIS — I50.812 CHRONIC RIGHT-SIDED CONGESTIVE HEART FAILURE (HCC): ICD-10-CM

## 2021-01-01 DIAGNOSIS — I12.9 HYPERTENSIVE NEPHROSCLEROSIS, STAGE 1 THROUGH STAGE 4 OR UNSPECIFIED CHRONIC KIDNEY DISEASE: ICD-10-CM

## 2021-01-01 DIAGNOSIS — Z71.89 COMPLEX CARE COORDINATION: Primary | ICD-10-CM

## 2021-01-01 DIAGNOSIS — E66.01 CLASS 3 SEVERE OBESITY DUE TO EXCESS CALORIES WITH SERIOUS COMORBIDITY AND BODY MASS INDEX (BMI) OF 45.0 TO 49.9 IN ADULT (HCC): Primary | ICD-10-CM

## 2021-01-01 LAB
25(OH)D3 SERPL-MCNC: 13.1 NG/ML (ref 30–100)
2HR DELTA HS TROPONIN: 0 NG/L
2HR DELTA HS TROPONIN: 1 NG/L
4HR DELTA HS TROPONIN: 0 NG/L
A1AT PHENOTYP SERPL IFE: ABNORMAL
A1AT SERPL-MCNC: 198 MG/DL (ref 101–187)
ABO GROUP BLD BPU: NORMAL
ABO GROUP BLD: NORMAL
AFP-TM SERPL-MCNC: 2.7 NG/ML (ref 0.5–8)
ALBUMIN SERPL BCP-MCNC: 1.8 G/DL (ref 3.5–5)
ALBUMIN SERPL BCP-MCNC: 1.9 G/DL (ref 3.5–5)
ALBUMIN SERPL BCP-MCNC: 1.9 G/DL (ref 3.5–5)
ALBUMIN SERPL BCP-MCNC: 2 G/DL (ref 3.5–5)
ALBUMIN SERPL BCP-MCNC: 2.1 G/DL (ref 3.5–5)
ALBUMIN SERPL BCP-MCNC: 2.2 G/DL (ref 3.5–5)
ALBUMIN SERPL BCP-MCNC: 2.3 G/DL (ref 3.5–5)
ALBUMIN SERPL BCP-MCNC: 2.3 G/DL (ref 3.5–5)
ALBUMIN SERPL BCP-MCNC: 2.4 G/DL (ref 3.5–5)
ALBUMIN SERPL BCP-MCNC: 2.5 G/DL (ref 3.5–5)
ALBUMIN SERPL BCP-MCNC: 2.6 G/DL (ref 3.5–5)
ALBUMIN SERPL BCP-MCNC: 2.7 G/DL (ref 3.5–5)
ALBUMIN SERPL BCP-MCNC: 2.8 G/DL (ref 3.5–5)
ALBUMIN SERPL BCP-MCNC: 2.9 G/DL (ref 3.5–5)
ALBUMIN SERPL BCP-MCNC: 3 G/DL (ref 3.5–5)
ALBUMIN SERPL BCP-MCNC: 3.1 G/DL (ref 3.5–5)
ALBUMIN SERPL ELPH-MCNC: 2.99 G/DL (ref 3.5–5)
ALBUMIN SERPL ELPH-MCNC: 44.7 % (ref 52–65)
ALP SERPL-CCNC: 119 U/L (ref 46–116)
ALP SERPL-CCNC: 120 U/L (ref 46–116)
ALP SERPL-CCNC: 121 U/L (ref 46–116)
ALP SERPL-CCNC: 123 U/L (ref 46–116)
ALP SERPL-CCNC: 126 U/L (ref 46–116)
ALP SERPL-CCNC: 127 U/L (ref 46–116)
ALP SERPL-CCNC: 130 U/L (ref 46–116)
ALP SERPL-CCNC: 133 U/L (ref 46–116)
ALP SERPL-CCNC: 135 U/L (ref 46–116)
ALP SERPL-CCNC: 136 U/L (ref 46–116)
ALP SERPL-CCNC: 137 U/L (ref 46–116)
ALP SERPL-CCNC: 142 U/L (ref 46–116)
ALP SERPL-CCNC: 143 U/L (ref 46–116)
ALP SERPL-CCNC: 152 U/L (ref 46–116)
ALP SERPL-CCNC: 157 U/L (ref 46–116)
ALP SERPL-CCNC: 158 U/L (ref 46–116)
ALP SERPL-CCNC: 161 U/L (ref 46–116)
ALP SERPL-CCNC: 162 U/L (ref 46–116)
ALP SERPL-CCNC: 164 U/L (ref 46–116)
ALP SERPL-CCNC: 171 U/L (ref 46–116)
ALP SERPL-CCNC: 174 U/L (ref 46–116)
ALP SERPL-CCNC: 177 U/L (ref 46–116)
ALP SERPL-CCNC: 184 U/L (ref 46–116)
ALP SERPL-CCNC: 186 U/L (ref 46–116)
ALP SERPL-CCNC: 187 U/L (ref 46–116)
ALP SERPL-CCNC: 187 U/L (ref 46–116)
ALP SERPL-CCNC: 189 U/L (ref 46–116)
ALP SERPL-CCNC: 190 U/L (ref 46–116)
ALP SERPL-CCNC: 190 U/L (ref 46–116)
ALP SERPL-CCNC: 191 U/L (ref 46–116)
ALP SERPL-CCNC: 194 U/L (ref 46–116)
ALP SERPL-CCNC: 195 U/L (ref 46–116)
ALP SERPL-CCNC: 196 U/L (ref 46–116)
ALP SERPL-CCNC: 205 U/L (ref 46–116)
ALP SERPL-CCNC: 206 U/L (ref 46–116)
ALP SERPL-CCNC: 216 U/L (ref 46–116)
ALP SERPL-CCNC: 224 U/L (ref 46–116)
ALP SERPL-CCNC: 251 U/L (ref 46–116)
ALP SERPL-CCNC: 258 U/L (ref 46–116)
ALPHA1 GLOB SERPL ELPH-MCNC: 0.44 G/DL (ref 0.1–0.4)
ALPHA1 GLOB SERPL ELPH-MCNC: 6.6 % (ref 2.5–5)
ALPHA2 GLOB SERPL ELPH-MCNC: 0.73 G/DL (ref 0.4–1.2)
ALPHA2 GLOB SERPL ELPH-MCNC: 10.9 % (ref 7–13)
ALT SERPL W P-5'-P-CCNC: 10 U/L (ref 12–78)
ALT SERPL W P-5'-P-CCNC: 10 U/L (ref 12–78)
ALT SERPL W P-5'-P-CCNC: 11 U/L (ref 12–78)
ALT SERPL W P-5'-P-CCNC: 12 U/L (ref 12–78)
ALT SERPL W P-5'-P-CCNC: 13 U/L (ref 12–78)
ALT SERPL W P-5'-P-CCNC: 14 U/L (ref 12–78)
ALT SERPL W P-5'-P-CCNC: 14 U/L (ref 12–78)
ALT SERPL W P-5'-P-CCNC: 15 U/L (ref 12–78)
ALT SERPL W P-5'-P-CCNC: 15 U/L (ref 12–78)
ALT SERPL W P-5'-P-CCNC: 16 U/L (ref 12–78)
ALT SERPL W P-5'-P-CCNC: 17 U/L (ref 12–78)
ALT SERPL W P-5'-P-CCNC: 18 U/L (ref 12–78)
ALT SERPL W P-5'-P-CCNC: 19 U/L (ref 12–78)
ALT SERPL W P-5'-P-CCNC: 20 U/L (ref 12–78)
ALT SERPL W P-5'-P-CCNC: 21 U/L (ref 12–78)
ALT SERPL W P-5'-P-CCNC: 22 U/L (ref 12–78)
ALT SERPL W P-5'-P-CCNC: 24 U/L (ref 12–78)
ALT SERPL W P-5'-P-CCNC: 25 U/L (ref 12–78)
ALT SERPL W P-5'-P-CCNC: 9 U/L (ref 12–78)
ALT SERPL W P-5'-P-CCNC: 9 U/L (ref 12–78)
ANION GAP SERPL CALCULATED.3IONS-SCNC: 10 MMOL/L (ref 4–13)
ANION GAP SERPL CALCULATED.3IONS-SCNC: 11 MMOL/L (ref 4–13)
ANION GAP SERPL CALCULATED.3IONS-SCNC: 12 MMOL/L (ref 4–13)
ANION GAP SERPL CALCULATED.3IONS-SCNC: 3 MMOL/L (ref 4–13)
ANION GAP SERPL CALCULATED.3IONS-SCNC: 4 MMOL/L (ref 4–13)
ANION GAP SERPL CALCULATED.3IONS-SCNC: 5 MMOL/L (ref 4–13)
ANION GAP SERPL CALCULATED.3IONS-SCNC: 6 MMOL/L (ref 4–13)
ANION GAP SERPL CALCULATED.3IONS-SCNC: 7 MMOL/L (ref 4–13)
ANION GAP SERPL CALCULATED.3IONS-SCNC: 8 MMOL/L (ref 4–13)
ANION GAP SERPL CALCULATED.3IONS-SCNC: 9 MMOL/L (ref 4–13)
ANISOCYTOSIS BLD QL SMEAR: PRESENT
ANISOCYTOSIS BLD QL SMEAR: PRESENT
APTT PPP: 40 SECONDS (ref 23–37)
APTT PPP: 49 SECONDS (ref 23–37)
APTT PPP: 50 SECONDS (ref 23–37)
APTT PPP: 82 SECONDS (ref 23–37)
APTT PPP: 84 SECONDS (ref 23–37)
AST SERPL W P-5'-P-CCNC: 11 U/L (ref 5–45)
AST SERPL W P-5'-P-CCNC: 11 U/L (ref 5–45)
AST SERPL W P-5'-P-CCNC: 12 U/L (ref 5–45)
AST SERPL W P-5'-P-CCNC: 13 U/L (ref 5–45)
AST SERPL W P-5'-P-CCNC: 14 U/L (ref 5–45)
AST SERPL W P-5'-P-CCNC: 15 U/L (ref 5–45)
AST SERPL W P-5'-P-CCNC: 16 U/L (ref 5–45)
AST SERPL W P-5'-P-CCNC: 17 U/L (ref 5–45)
AST SERPL W P-5'-P-CCNC: 18 U/L (ref 5–45)
AST SERPL W P-5'-P-CCNC: 19 U/L (ref 5–45)
AST SERPL W P-5'-P-CCNC: 20 U/L (ref 5–45)
AST SERPL W P-5'-P-CCNC: 21 U/L (ref 5–45)
AST SERPL W P-5'-P-CCNC: 22 U/L (ref 5–45)
AST SERPL W P-5'-P-CCNC: 23 U/L (ref 5–45)
AST SERPL W P-5'-P-CCNC: 24 U/L (ref 5–45)
AST SERPL W P-5'-P-CCNC: 24 U/L (ref 5–45)
AST SERPL W P-5'-P-CCNC: 25 U/L (ref 5–45)
AST SERPL W P-5'-P-CCNC: 25 U/L (ref 5–45)
AST SERPL W P-5'-P-CCNC: 27 U/L (ref 5–45)
AST SERPL W P-5'-P-CCNC: 27 U/L (ref 5–45)
AST SERPL W P-5'-P-CCNC: 30 U/L (ref 5–45)
ATRIAL RATE: 103 BPM
ATRIAL RATE: 111 BPM
ATRIAL RATE: 114 BPM
ATRIAL RATE: 117 BPM
ATRIAL RATE: 163 BPM
ATRIAL RATE: 52 BPM
ATRIAL RATE: 61 BPM
BACTERIA BLD CULT: NORMAL
BACTERIA SPEC ANAEROBE CULT: NO GROWTH
BACTERIA SPEC BFLD CULT: ABNORMAL
BACTERIA SPEC BFLD CULT: NO GROWTH
BACTERIA TISS AEROBE CULT: NO GROWTH
BACTERIA UR CULT: NORMAL
BACTERIA UR QL AUTO: ABNORMAL /HPF
BACTERIA UR QL AUTO: NORMAL /HPF
BACTERIA UR QL AUTO: NORMAL /HPF
BASE EX.OXY STD BLDV CALC-SCNC: 73.7 % (ref 60–80)
BASE EX.OXY STD BLDV CALC-SCNC: 86.6 % (ref 60–80)
BASE EXCESS BLDA CALC-SCNC: 12 MMOL/L (ref -2–3)
BASE EXCESS BLDA CALC-SCNC: 4 MMOL/L (ref -2–3)
BASE EXCESS BLDA CALC-SCNC: 5 MMOL/L (ref -2–3)
BASE EXCESS BLDA CALC-SCNC: 6 MMOL/L (ref -2–3)
BASE EXCESS BLDV CALC-SCNC: -0.5 MMOL/L
BASE EXCESS BLDV CALC-SCNC: 1.3 MMOL/L
BASO STIPL BLD QL SMEAR: PRESENT
BASOPHILS # BLD AUTO: 0.01 THOUSANDS/ΜL (ref 0–0.1)
BASOPHILS # BLD AUTO: 0.02 THOUSANDS/ΜL (ref 0–0.1)
BASOPHILS # BLD AUTO: 0.03 THOUSANDS/ΜL (ref 0–0.1)
BASOPHILS # BLD AUTO: 0.04 THOUSANDS/ΜL (ref 0–0.1)
BASOPHILS # BLD AUTO: 0.05 THOUSANDS/ΜL (ref 0–0.1)
BASOPHILS # BLD AUTO: 0.06 THOUSANDS/ΜL (ref 0–0.1)
BASOPHILS # BLD AUTO: 0.07 THOUSANDS/ΜL (ref 0–0.1)
BASOPHILS # BLD MANUAL: 0 THOUSAND/UL (ref 0–0.1)
BASOPHILS # BLD MANUAL: 0.06 THOUSAND/UL (ref 0–0.1)
BASOPHILS NFR BLD AUTO: 0 % (ref 0–1)
BASOPHILS NFR BLD AUTO: 1 % (ref 0–1)
BASOPHILS NFR BLD AUTO: 2 % (ref 0–1)
BASOPHILS NFR BLD AUTO: 2 % (ref 0–1)
BASOPHILS NFR BLD AUTO: 3 % (ref 0–1)
BASOPHILS NFR BLD AUTO: 3 % (ref 0–1)
BASOPHILS NFR MAR MANUAL: 0 % (ref 0–1)
BASOPHILS NFR MAR MANUAL: 1 % (ref 0–1)
BETA GLOB ABNORMAL SERPL ELPH-MCNC: 0.58 G/DL (ref 0.4–0.8)
BETA1 GLOB SERPL ELPH-MCNC: 8.7 % (ref 5–13)
BETA2 GLOB SERPL ELPH-MCNC: 9.1 % (ref 2–8)
BETA2+GAMMA GLOB SERPL ELPH-MCNC: 0.61 G/DL (ref 0.2–0.5)
BILIRUB DIRECT SERPL-MCNC: 0.32 MG/DL (ref 0–0.2)
BILIRUB DIRECT SERPL-MCNC: 0.37 MG/DL (ref 0–0.2)
BILIRUB DIRECT SERPL-MCNC: 0.38 MG/DL (ref 0–0.2)
BILIRUB DIRECT SERPL-MCNC: 0.39 MG/DL (ref 0–0.2)
BILIRUB DIRECT SERPL-MCNC: 0.48 MG/DL (ref 0–0.2)
BILIRUB DIRECT SERPL-MCNC: 0.61 MG/DL (ref 0–0.2)
BILIRUB DIRECT SERPL-MCNC: 0.92 MG/DL (ref 0–0.2)
BILIRUB DIRECT SERPL-MCNC: 1.09 MG/DL (ref 0–0.2)
BILIRUB SERPL-MCNC: 0.46 MG/DL (ref 0.2–1)
BILIRUB SERPL-MCNC: 0.46 MG/DL (ref 0.2–1)
BILIRUB SERPL-MCNC: 0.56 MG/DL (ref 0.2–1)
BILIRUB SERPL-MCNC: 0.58 MG/DL (ref 0.2–1)
BILIRUB SERPL-MCNC: 0.6 MG/DL (ref 0.2–1)
BILIRUB SERPL-MCNC: 0.6 MG/DL (ref 0.2–1)
BILIRUB SERPL-MCNC: 0.62 MG/DL (ref 0.2–1)
BILIRUB SERPL-MCNC: 0.66 MG/DL (ref 0.2–1)
BILIRUB SERPL-MCNC: 0.66 MG/DL (ref 0.2–1)
BILIRUB SERPL-MCNC: 0.67 MG/DL (ref 0.2–1)
BILIRUB SERPL-MCNC: 0.67 MG/DL (ref 0.2–1)
BILIRUB SERPL-MCNC: 0.68 MG/DL (ref 0.2–1)
BILIRUB SERPL-MCNC: 0.7 MG/DL (ref 0.2–1)
BILIRUB SERPL-MCNC: 0.72 MG/DL (ref 0.2–1)
BILIRUB SERPL-MCNC: 0.75 MG/DL (ref 0.2–1)
BILIRUB SERPL-MCNC: 0.76 MG/DL (ref 0.2–1)
BILIRUB SERPL-MCNC: 0.76 MG/DL (ref 0.2–1)
BILIRUB SERPL-MCNC: 0.77 MG/DL (ref 0.2–1)
BILIRUB SERPL-MCNC: 0.8 MG/DL (ref 0.2–1)
BILIRUB SERPL-MCNC: 0.81 MG/DL (ref 0.2–1)
BILIRUB SERPL-MCNC: 0.81 MG/DL (ref 0.2–1)
BILIRUB SERPL-MCNC: 0.82 MG/DL (ref 0.2–1)
BILIRUB SERPL-MCNC: 0.85 MG/DL (ref 0.2–1)
BILIRUB SERPL-MCNC: 0.86 MG/DL (ref 0.2–1)
BILIRUB SERPL-MCNC: 0.89 MG/DL (ref 0.2–1)
BILIRUB SERPL-MCNC: 0.9 MG/DL (ref 0.2–1)
BILIRUB SERPL-MCNC: 0.9 MG/DL (ref 0.2–1)
BILIRUB SERPL-MCNC: 0.93 MG/DL (ref 0.2–1)
BILIRUB SERPL-MCNC: 0.93 MG/DL (ref 0.2–1)
BILIRUB SERPL-MCNC: 1 MG/DL (ref 0.2–1)
BILIRUB SERPL-MCNC: 1.1 MG/DL (ref 0.2–1)
BILIRUB SERPL-MCNC: 1.1 MG/DL (ref 0.2–1)
BILIRUB SERPL-MCNC: 1.27 MG/DL (ref 0.2–1)
BILIRUB SERPL-MCNC: 1.5 MG/DL (ref 0.2–1)
BILIRUB SERPL-MCNC: 1.63 MG/DL (ref 0.2–1)
BILIRUB SERPL-MCNC: 1.64 MG/DL (ref 0.2–1)
BILIRUB SERPL-MCNC: 1.78 MG/DL (ref 0.2–1)
BILIRUB SERPL-MCNC: 1.8 MG/DL (ref 0.2–1)
BILIRUB UR QL STRIP: ABNORMAL
BILIRUB UR QL STRIP: ABNORMAL
BILIRUB UR QL STRIP: NEGATIVE
BLD GP AB SCN SERPL QL: NEGATIVE
BPU ID: NORMAL
BUN SERPL-MCNC: 20 MG/DL (ref 5–25)
BUN SERPL-MCNC: 21 MG/DL (ref 5–25)
BUN SERPL-MCNC: 21 MG/DL (ref 5–25)
BUN SERPL-MCNC: 22 MG/DL (ref 5–25)
BUN SERPL-MCNC: 24 MG/DL (ref 5–25)
BUN SERPL-MCNC: 25 MG/DL (ref 5–25)
BUN SERPL-MCNC: 26 MG/DL (ref 5–25)
BUN SERPL-MCNC: 26 MG/DL (ref 5–25)
BUN SERPL-MCNC: 27 MG/DL (ref 5–25)
BUN SERPL-MCNC: 28 MG/DL (ref 5–25)
BUN SERPL-MCNC: 30 MG/DL (ref 5–25)
BUN SERPL-MCNC: 32 MG/DL (ref 5–25)
BUN SERPL-MCNC: 33 MG/DL (ref 5–25)
BUN SERPL-MCNC: 34 MG/DL (ref 5–25)
BUN SERPL-MCNC: 35 MG/DL (ref 5–25)
BUN SERPL-MCNC: 36 MG/DL (ref 5–25)
BUN SERPL-MCNC: 37 MG/DL (ref 5–25)
BUN SERPL-MCNC: 40 MG/DL (ref 5–25)
BUN SERPL-MCNC: 47 MG/DL (ref 5–25)
BUN SERPL-MCNC: 48 MG/DL (ref 5–25)
BUN SERPL-MCNC: 52 MG/DL (ref 5–25)
BUN SERPL-MCNC: 52 MG/DL (ref 5–25)
BUN SERPL-MCNC: 55 MG/DL (ref 5–25)
BUN SERPL-MCNC: 56 MG/DL (ref 5–25)
BUN SERPL-MCNC: 57 MG/DL (ref 5–25)
BUN SERPL-MCNC: 58 MG/DL (ref 5–25)
BUN SERPL-MCNC: 59 MG/DL (ref 5–25)
BUN SERPL-MCNC: 59 MG/DL (ref 5–25)
BUN SERPL-MCNC: 60 MG/DL (ref 5–25)
BUN SERPL-MCNC: 63 MG/DL (ref 5–25)
BUN SERPL-MCNC: 65 MG/DL (ref 5–25)
BUN SERPL-MCNC: 65 MG/DL (ref 5–25)
BUN SERPL-MCNC: 67 MG/DL (ref 5–25)
BUN SERPL-MCNC: 68 MG/DL (ref 5–25)
BUN SERPL-MCNC: 68 MG/DL (ref 5–25)
BUN SERPL-MCNC: 69 MG/DL (ref 5–25)
BUN SERPL-MCNC: 69 MG/DL (ref 5–25)
BUN SERPL-MCNC: 71 MG/DL (ref 5–25)
BUN SERPL-MCNC: 73 MG/DL (ref 5–25)
BUN SERPL-MCNC: 73 MG/DL (ref 5–25)
BUN SERPL-MCNC: 79 MG/DL (ref 5–25)
BUN SERPL-MCNC: 80 MG/DL (ref 5–25)
BUN SERPL-MCNC: 82 MG/DL (ref 5–25)
BUN SERPL-MCNC: 85 MG/DL (ref 5–25)
BUN SERPL-MCNC: 85 MG/DL (ref 5–25)
BUN SERPL-MCNC: 88 MG/DL (ref 5–25)
BUN SERPL-MCNC: 93 MG/DL (ref 5–25)
CA-I BLD-SCNC: 0.99 MMOL/L (ref 1.12–1.32)
CA-I BLD-SCNC: 1 MMOL/L (ref 1.12–1.32)
CA-I BLD-SCNC: 1 MMOL/L (ref 1.12–1.32)
CA-I BLD-SCNC: 1.02 MMOL/L (ref 1.12–1.32)
CA-I BLD-SCNC: 1.09 MMOL/L (ref 1.12–1.32)
CA-I BLD-SCNC: 1.13 MMOL/L (ref 1.12–1.32)
CA-I BLD-SCNC: 1.16 MMOL/L (ref 1.12–1.32)
CA-I BLD-SCNC: 1.18 MMOL/L (ref 1.12–1.32)
CA-I BLD-SCNC: 1.18 MMOL/L (ref 1.12–1.32)
CALCIUM ALBUM COR SERPL-MCNC: 10 MG/DL (ref 8.3–10.1)
CALCIUM ALBUM COR SERPL-MCNC: 10.1 MG/DL (ref 8.3–10.1)
CALCIUM ALBUM COR SERPL-MCNC: 10.2 MG/DL (ref 8.3–10.1)
CALCIUM ALBUM COR SERPL-MCNC: 10.2 MG/DL (ref 8.3–10.1)
CALCIUM ALBUM COR SERPL-MCNC: 10.3 MG/DL (ref 8.3–10.1)
CALCIUM ALBUM COR SERPL-MCNC: 10.4 MG/DL (ref 8.3–10.1)
CALCIUM ALBUM COR SERPL-MCNC: 10.8 MG/DL (ref 8.3–10.1)
CALCIUM ALBUM COR SERPL-MCNC: 11.4 MG/DL (ref 8.3–10.1)
CALCIUM ALBUM COR SERPL-MCNC: 9.1 MG/DL (ref 8.3–10.1)
CALCIUM ALBUM COR SERPL-MCNC: 9.3 MG/DL (ref 8.3–10.1)
CALCIUM ALBUM COR SERPL-MCNC: 9.4 MG/DL (ref 8.3–10.1)
CALCIUM ALBUM COR SERPL-MCNC: 9.4 MG/DL (ref 8.3–10.1)
CALCIUM ALBUM COR SERPL-MCNC: 9.6 MG/DL (ref 8.3–10.1)
CALCIUM ALBUM COR SERPL-MCNC: 9.7 MG/DL (ref 8.3–10.1)
CALCIUM ALBUM COR SERPL-MCNC: 9.8 MG/DL (ref 8.3–10.1)
CALCIUM ALBUM COR SERPL-MCNC: 9.9 MG/DL (ref 8.3–10.1)
CALCIUM SERPL-MCNC: 8 MG/DL (ref 8.3–10.1)
CALCIUM SERPL-MCNC: 8.1 MG/DL (ref 8.3–10.1)
CALCIUM SERPL-MCNC: 8.2 MG/DL (ref 8.3–10.1)
CALCIUM SERPL-MCNC: 8.3 MG/DL (ref 8.3–10.1)
CALCIUM SERPL-MCNC: 8.4 MG/DL (ref 8.3–10.1)
CALCIUM SERPL-MCNC: 8.5 MG/DL (ref 8.3–10.1)
CALCIUM SERPL-MCNC: 8.6 MG/DL (ref 8.3–10.1)
CALCIUM SERPL-MCNC: 8.7 MG/DL (ref 8.3–10.1)
CALCIUM SERPL-MCNC: 8.8 MG/DL (ref 8.3–10.1)
CALCIUM SERPL-MCNC: 8.9 MG/DL (ref 8.3–10.1)
CALCIUM SERPL-MCNC: 9 MG/DL (ref 8.3–10.1)
CALCIUM SERPL-MCNC: 9.1 MG/DL (ref 8.3–10.1)
CALCIUM SERPL-MCNC: 9.2 MG/DL (ref 8.3–10.1)
CALCIUM SERPL-MCNC: 9.3 MG/DL (ref 8.3–10.1)
CALCIUM SERPL-MCNC: 9.4 MG/DL (ref 8.3–10.1)
CALCIUM SERPL-MCNC: 9.7 MG/DL (ref 8.3–10.1)
CARDIAC TROPONIN I PNL SERPL HS: 7 NG/L
CARDIAC TROPONIN I PNL SERPL HS: 7 NG/L
CARDIAC TROPONIN I PNL SERPL HS: 8 NG/L
CHLORIDE SERPL-SCNC: 100 MMOL/L (ref 100–108)
CHLORIDE SERPL-SCNC: 101 MMOL/L (ref 100–108)
CHLORIDE SERPL-SCNC: 102 MMOL/L (ref 100–108)
CHLORIDE SERPL-SCNC: 103 MMOL/L (ref 100–108)
CHLORIDE SERPL-SCNC: 84 MMOL/L (ref 100–108)
CHLORIDE SERPL-SCNC: 85 MMOL/L (ref 100–108)
CHLORIDE SERPL-SCNC: 86 MMOL/L (ref 100–108)
CHLORIDE SERPL-SCNC: 87 MMOL/L (ref 100–108)
CHLORIDE SERPL-SCNC: 87 MMOL/L (ref 100–108)
CHLORIDE SERPL-SCNC: 88 MMOL/L (ref 100–108)
CHLORIDE SERPL-SCNC: 88 MMOL/L (ref 100–108)
CHLORIDE SERPL-SCNC: 89 MMOL/L (ref 100–108)
CHLORIDE SERPL-SCNC: 89 MMOL/L (ref 100–108)
CHLORIDE SERPL-SCNC: 90 MMOL/L (ref 100–108)
CHLORIDE SERPL-SCNC: 90 MMOL/L (ref 100–108)
CHLORIDE SERPL-SCNC: 91 MMOL/L (ref 100–108)
CHLORIDE SERPL-SCNC: 92 MMOL/L (ref 100–108)
CHLORIDE SERPL-SCNC: 92 MMOL/L (ref 100–108)
CHLORIDE SERPL-SCNC: 93 MMOL/L (ref 100–108)
CHLORIDE SERPL-SCNC: 94 MMOL/L (ref 100–108)
CHLORIDE SERPL-SCNC: 95 MMOL/L (ref 100–108)
CHLORIDE SERPL-SCNC: 96 MMOL/L (ref 100–108)
CHLORIDE SERPL-SCNC: 97 MMOL/L (ref 100–108)
CHLORIDE SERPL-SCNC: 98 MMOL/L (ref 100–108)
CHLORIDE SERPL-SCNC: 99 MMOL/L (ref 100–108)
CHLORIDE UR-SCNC: 41 MMOL/L
CK SERPL-CCNC: 44 U/L (ref 39–308)
CK SERPL-CCNC: 9 U/L (ref 39–308)
CLARITY UR: CLEAR
CO2 SERPL-SCNC: 27 MMOL/L (ref 21–32)
CO2 SERPL-SCNC: 27 MMOL/L (ref 21–32)
CO2 SERPL-SCNC: 28 MMOL/L (ref 21–32)
CO2 SERPL-SCNC: 29 MMOL/L (ref 21–32)
CO2 SERPL-SCNC: 30 MMOL/L (ref 21–32)
CO2 SERPL-SCNC: 31 MMOL/L (ref 21–32)
CO2 SERPL-SCNC: 32 MMOL/L (ref 21–32)
CO2 SERPL-SCNC: 33 MMOL/L (ref 21–32)
CO2 SERPL-SCNC: 34 MMOL/L (ref 21–32)
CO2 SERPL-SCNC: 35 MMOL/L (ref 21–32)
CO2 SERPL-SCNC: 36 MMOL/L (ref 21–32)
CO2 SERPL-SCNC: 37 MMOL/L (ref 21–32)
CO2 SERPL-SCNC: 38 MMOL/L (ref 21–32)
COLOR UR: ABNORMAL
COLOR UR: ABNORMAL
COLOR UR: NORMAL
COLOR UR: YELLOW
CREAT SERPL-MCNC: 1.18 MG/DL (ref 0.6–1.3)
CREAT SERPL-MCNC: 1.24 MG/DL (ref 0.6–1.3)
CREAT SERPL-MCNC: 1.27 MG/DL (ref 0.6–1.3)
CREAT SERPL-MCNC: 1.27 MG/DL (ref 0.6–1.3)
CREAT SERPL-MCNC: 1.28 MG/DL (ref 0.6–1.3)
CREAT SERPL-MCNC: 1.28 MG/DL (ref 0.6–1.3)
CREAT SERPL-MCNC: 1.34 MG/DL (ref 0.6–1.3)
CREAT SERPL-MCNC: 1.39 MG/DL (ref 0.6–1.3)
CREAT SERPL-MCNC: 1.4 MG/DL (ref 0.6–1.3)
CREAT SERPL-MCNC: 1.43 MG/DL (ref 0.6–1.3)
CREAT SERPL-MCNC: 1.44 MG/DL (ref 0.6–1.3)
CREAT SERPL-MCNC: 1.46 MG/DL (ref 0.6–1.3)
CREAT SERPL-MCNC: 1.46 MG/DL (ref 0.6–1.3)
CREAT SERPL-MCNC: 1.47 MG/DL (ref 0.6–1.3)
CREAT SERPL-MCNC: 1.5 MG/DL (ref 0.6–1.3)
CREAT SERPL-MCNC: 1.51 MG/DL (ref 0.6–1.3)
CREAT SERPL-MCNC: 1.55 MG/DL (ref 0.6–1.3)
CREAT SERPL-MCNC: 1.56 MG/DL (ref 0.6–1.3)
CREAT SERPL-MCNC: 1.57 MG/DL (ref 0.6–1.3)
CREAT SERPL-MCNC: 1.58 MG/DL (ref 0.6–1.3)
CREAT SERPL-MCNC: 1.59 MG/DL (ref 0.6–1.3)
CREAT SERPL-MCNC: 1.6 MG/DL (ref 0.6–1.3)
CREAT SERPL-MCNC: 1.61 MG/DL (ref 0.6–1.3)
CREAT SERPL-MCNC: 1.61 MG/DL (ref 0.6–1.3)
CREAT SERPL-MCNC: 1.62 MG/DL (ref 0.6–1.3)
CREAT SERPL-MCNC: 1.65 MG/DL (ref 0.6–1.3)
CREAT SERPL-MCNC: 1.65 MG/DL (ref 0.6–1.3)
CREAT SERPL-MCNC: 1.66 MG/DL (ref 0.6–1.3)
CREAT SERPL-MCNC: 1.67 MG/DL (ref 0.6–1.3)
CREAT SERPL-MCNC: 1.67 MG/DL (ref 0.6–1.3)
CREAT SERPL-MCNC: 1.69 MG/DL (ref 0.6–1.3)
CREAT SERPL-MCNC: 1.7 MG/DL (ref 0.6–1.3)
CREAT SERPL-MCNC: 1.76 MG/DL (ref 0.6–1.3)
CREAT SERPL-MCNC: 1.79 MG/DL (ref 0.6–1.3)
CREAT SERPL-MCNC: 1.82 MG/DL (ref 0.6–1.3)
CREAT SERPL-MCNC: 1.86 MG/DL (ref 0.6–1.3)
CREAT SERPL-MCNC: 1.88 MG/DL (ref 0.6–1.3)
CREAT SERPL-MCNC: 1.89 MG/DL (ref 0.6–1.3)
CREAT SERPL-MCNC: 1.89 MG/DL (ref 0.6–1.3)
CREAT SERPL-MCNC: 1.93 MG/DL (ref 0.6–1.3)
CREAT SERPL-MCNC: 1.93 MG/DL (ref 0.6–1.3)
CREAT SERPL-MCNC: 1.97 MG/DL (ref 0.6–1.3)
CREAT SERPL-MCNC: 2.02 MG/DL (ref 0.6–1.3)
CREAT SERPL-MCNC: 2.02 MG/DL (ref 0.6–1.3)
CREAT SERPL-MCNC: 2.06 MG/DL (ref 0.6–1.3)
CREAT SERPL-MCNC: 2.08 MG/DL (ref 0.6–1.3)
CREAT SERPL-MCNC: 2.11 MG/DL (ref 0.6–1.3)
CREAT SERPL-MCNC: 2.12 MG/DL (ref 0.6–1.3)
CREAT SERPL-MCNC: 2.3 MG/DL (ref 0.6–1.3)
CREAT SERPL-MCNC: 2.31 MG/DL (ref 0.6–1.3)
CREAT SERPL-MCNC: 2.34 MG/DL (ref 0.6–1.3)
CREAT SERPL-MCNC: 2.36 MG/DL (ref 0.6–1.3)
CREAT SERPL-MCNC: 2.37 MG/DL (ref 0.6–1.3)
CREAT SERPL-MCNC: 2.4 MG/DL (ref 0.6–1.3)
CREAT SERPL-MCNC: 2.42 MG/DL (ref 0.6–1.3)
CREAT SERPL-MCNC: 2.43 MG/DL (ref 0.6–1.3)
CREAT SERPL-MCNC: 2.51 MG/DL (ref 0.6–1.3)
CREAT SERPL-MCNC: 2.54 MG/DL (ref 0.6–1.3)
CREAT SERPL-MCNC: 2.54 MG/DL (ref 0.6–1.3)
CREAT SERPL-MCNC: 2.59 MG/DL (ref 0.6–1.3)
CREAT SERPL-MCNC: 2.61 MG/DL (ref 0.6–1.3)
CREAT SERPL-MCNC: 2.68 MG/DL (ref 0.6–1.3)
CREAT SERPL-MCNC: 2.72 MG/DL (ref 0.6–1.3)
CREAT SERPL-MCNC: 2.8 MG/DL (ref 0.6–1.3)
CREAT UR-MCNC: 126 MG/DL
CREAT UR-MCNC: 18 MG/DL
CREAT UR-MCNC: 194 MG/DL
CREAT UR-MCNC: 205 MG/DL
CREAT UR-MCNC: 79 MG/DL
CROSSMATCH: NORMAL
DATE SPECIMEN #1: NORMAL
DIGOXIN SERPL-MCNC: 1.7 NG/ML (ref 0.8–2)
DIGOXIN SERPL-MCNC: 1.7 NG/ML (ref 0.8–2)
EOSINOPHIL # BLD AUTO: 0 THOUSAND/ΜL (ref 0–0.61)
EOSINOPHIL # BLD AUTO: 0.02 THOUSAND/ΜL (ref 0–0.61)
EOSINOPHIL # BLD AUTO: 0.04 THOUSAND/ΜL (ref 0–0.61)
EOSINOPHIL # BLD AUTO: 0.08 THOUSAND/ΜL (ref 0–0.61)
EOSINOPHIL # BLD AUTO: 0.1 THOUSAND/ΜL (ref 0–0.61)
EOSINOPHIL # BLD AUTO: 0.11 THOUSAND/ΜL (ref 0–0.61)
EOSINOPHIL # BLD AUTO: 0.12 THOUSAND/ΜL (ref 0–0.61)
EOSINOPHIL # BLD AUTO: 0.13 THOUSAND/ΜL (ref 0–0.61)
EOSINOPHIL # BLD AUTO: 0.13 THOUSAND/ΜL (ref 0–0.61)
EOSINOPHIL # BLD AUTO: 0.14 THOUSAND/ΜL (ref 0–0.61)
EOSINOPHIL # BLD AUTO: 0.16 THOUSAND/ΜL (ref 0–0.61)
EOSINOPHIL # BLD AUTO: 0.16 THOUSAND/ΜL (ref 0–0.61)
EOSINOPHIL # BLD AUTO: 0.17 THOUSAND/ΜL (ref 0–0.61)
EOSINOPHIL # BLD AUTO: 0.18 THOUSAND/ΜL (ref 0–0.61)
EOSINOPHIL # BLD AUTO: 0.19 THOUSAND/ΜL (ref 0–0.61)
EOSINOPHIL # BLD AUTO: 0.2 THOUSAND/ΜL (ref 0–0.61)
EOSINOPHIL # BLD AUTO: 0.2 THOUSAND/ΜL (ref 0–0.61)
EOSINOPHIL # BLD AUTO: 0.21 THOUSAND/ΜL (ref 0–0.61)
EOSINOPHIL # BLD AUTO: 0.22 THOUSAND/ΜL (ref 0–0.61)
EOSINOPHIL # BLD AUTO: 0.23 THOUSAND/ΜL (ref 0–0.61)
EOSINOPHIL # BLD AUTO: 0.24 THOUSAND/ΜL (ref 0–0.61)
EOSINOPHIL # BLD AUTO: 0.25 THOUSAND/ΜL (ref 0–0.61)
EOSINOPHIL # BLD AUTO: 0.26 THOUSAND/ΜL (ref 0–0.61)
EOSINOPHIL # BLD AUTO: 0.27 THOUSAND/ΜL (ref 0–0.61)
EOSINOPHIL # BLD AUTO: 0.28 THOUSAND/ΜL (ref 0–0.61)
EOSINOPHIL # BLD AUTO: 0.29 THOUSAND/ΜL (ref 0–0.61)
EOSINOPHIL # BLD AUTO: 0.31 THOUSAND/ΜL (ref 0–0.61)
EOSINOPHIL # BLD AUTO: 0.32 THOUSAND/ΜL (ref 0–0.61)
EOSINOPHIL # BLD AUTO: 0.32 THOUSAND/ΜL (ref 0–0.61)
EOSINOPHIL # BLD AUTO: 0.37 THOUSAND/ΜL (ref 0–0.61)
EOSINOPHIL # BLD MANUAL: 0 THOUSAND/UL (ref 0–0.4)
EOSINOPHIL # BLD MANUAL: 0 THOUSAND/UL (ref 0–0.4)
EOSINOPHIL NFR BLD AUTO: 0 % (ref 0–6)
EOSINOPHIL NFR BLD AUTO: 1 % (ref 0–6)
EOSINOPHIL NFR BLD AUTO: 1 % (ref 0–6)
EOSINOPHIL NFR BLD AUTO: 2 % (ref 0–6)
EOSINOPHIL NFR BLD AUTO: 3 % (ref 0–6)
EOSINOPHIL NFR BLD AUTO: 4 % (ref 0–6)
EOSINOPHIL NFR BLD AUTO: 5 % (ref 0–6)
EOSINOPHIL NFR BLD AUTO: 6 % (ref 0–6)
EOSINOPHIL NFR BLD AUTO: 7 % (ref 0–6)
EOSINOPHIL NFR BLD MANUAL: 0 % (ref 0–6)
EOSINOPHIL NFR BLD MANUAL: 0 % (ref 0–6)
EOSINOPHIL NFR FLD MANUAL: 11 %
EPO SERPL-ACNC: 97.9 MIU/ML (ref 2.6–18.5)
ERYTHROCYTE [DISTWIDTH] IN BLOOD BY AUTOMATED COUNT: 15.1 % (ref 11.6–15.1)
ERYTHROCYTE [DISTWIDTH] IN BLOOD BY AUTOMATED COUNT: 15.3 % (ref 11.6–15.1)
ERYTHROCYTE [DISTWIDTH] IN BLOOD BY AUTOMATED COUNT: 15.3 % (ref 11.6–15.1)
ERYTHROCYTE [DISTWIDTH] IN BLOOD BY AUTOMATED COUNT: 15.5 % (ref 11.6–15.1)
ERYTHROCYTE [DISTWIDTH] IN BLOOD BY AUTOMATED COUNT: 15.6 % (ref 11.6–15.1)
ERYTHROCYTE [DISTWIDTH] IN BLOOD BY AUTOMATED COUNT: 15.6 % (ref 11.6–15.1)
ERYTHROCYTE [DISTWIDTH] IN BLOOD BY AUTOMATED COUNT: 15.7 % (ref 11.6–15.1)
ERYTHROCYTE [DISTWIDTH] IN BLOOD BY AUTOMATED COUNT: 15.7 % (ref 11.6–15.1)
ERYTHROCYTE [DISTWIDTH] IN BLOOD BY AUTOMATED COUNT: 15.8 % (ref 11.6–15.1)
ERYTHROCYTE [DISTWIDTH] IN BLOOD BY AUTOMATED COUNT: 15.9 % (ref 11.6–15.1)
ERYTHROCYTE [DISTWIDTH] IN BLOOD BY AUTOMATED COUNT: 15.9 % (ref 11.6–15.1)
ERYTHROCYTE [DISTWIDTH] IN BLOOD BY AUTOMATED COUNT: 16 % (ref 11.6–15.1)
ERYTHROCYTE [DISTWIDTH] IN BLOOD BY AUTOMATED COUNT: 16 % (ref 11.6–15.1)
ERYTHROCYTE [DISTWIDTH] IN BLOOD BY AUTOMATED COUNT: 16.1 % (ref 11.6–15.1)
ERYTHROCYTE [DISTWIDTH] IN BLOOD BY AUTOMATED COUNT: 16.1 % (ref 11.6–15.1)
ERYTHROCYTE [DISTWIDTH] IN BLOOD BY AUTOMATED COUNT: 16.2 % (ref 11.6–15.1)
ERYTHROCYTE [DISTWIDTH] IN BLOOD BY AUTOMATED COUNT: 16.2 % (ref 11.6–15.1)
ERYTHROCYTE [DISTWIDTH] IN BLOOD BY AUTOMATED COUNT: 16.3 % (ref 11.6–15.1)
ERYTHROCYTE [DISTWIDTH] IN BLOOD BY AUTOMATED COUNT: 16.4 % (ref 11.6–15.1)
ERYTHROCYTE [DISTWIDTH] IN BLOOD BY AUTOMATED COUNT: 16.4 % (ref 11.6–15.1)
ERYTHROCYTE [DISTWIDTH] IN BLOOD BY AUTOMATED COUNT: 16.5 % (ref 11.6–15.1)
ERYTHROCYTE [DISTWIDTH] IN BLOOD BY AUTOMATED COUNT: 16.5 % (ref 11.6–15.1)
ERYTHROCYTE [DISTWIDTH] IN BLOOD BY AUTOMATED COUNT: 16.6 % (ref 11.6–15.1)
ERYTHROCYTE [DISTWIDTH] IN BLOOD BY AUTOMATED COUNT: 16.7 % (ref 11.6–15.1)
ERYTHROCYTE [DISTWIDTH] IN BLOOD BY AUTOMATED COUNT: 16.8 % (ref 11.6–15.1)
ERYTHROCYTE [DISTWIDTH] IN BLOOD BY AUTOMATED COUNT: 16.9 % (ref 11.6–15.1)
ERYTHROCYTE [DISTWIDTH] IN BLOOD BY AUTOMATED COUNT: 17 % (ref 11.6–15.1)
ERYTHROCYTE [DISTWIDTH] IN BLOOD BY AUTOMATED COUNT: 17.1 % (ref 11.6–15.1)
ERYTHROCYTE [DISTWIDTH] IN BLOOD BY AUTOMATED COUNT: 17.2 % (ref 11.6–15.1)
ERYTHROCYTE [DISTWIDTH] IN BLOOD BY AUTOMATED COUNT: 17.7 % (ref 11.6–15.1)
ERYTHROCYTE [DISTWIDTH] IN BLOOD BY AUTOMATED COUNT: 17.8 % (ref 11.6–15.1)
ERYTHROCYTE [DISTWIDTH] IN BLOOD BY AUTOMATED COUNT: 17.9 % (ref 11.6–15.1)
ERYTHROCYTE [DISTWIDTH] IN BLOOD BY AUTOMATED COUNT: 18 % (ref 11.6–15.1)
ERYTHROCYTE [DISTWIDTH] IN BLOOD BY AUTOMATED COUNT: 20.2 % (ref 11.6–15.1)
ERYTHROCYTE [DISTWIDTH] IN BLOOD BY AUTOMATED COUNT: 20.5 % (ref 11.6–15.1)
ERYTHROCYTE [DISTWIDTH] IN BLOOD BY AUTOMATED COUNT: 20.8 % (ref 11.6–15.1)
ERYTHROCYTE [DISTWIDTH] IN BLOOD BY AUTOMATED COUNT: 20.9 % (ref 11.6–15.1)
ERYTHROCYTE [DISTWIDTH] IN BLOOD BY AUTOMATED COUNT: 20.9 % (ref 11.6–15.1)
ERYTHROCYTE [DISTWIDTH] IN BLOOD BY AUTOMATED COUNT: 21.1 % (ref 11.6–15.1)
ERYTHROCYTE [DISTWIDTH] IN BLOOD BY AUTOMATED COUNT: 21.2 % (ref 11.6–15.1)
ERYTHROCYTE [DISTWIDTH] IN BLOOD BY AUTOMATED COUNT: 21.2 % (ref 11.6–15.1)
ERYTHROCYTE [DISTWIDTH] IN BLOOD BY AUTOMATED COUNT: 21.4 % (ref 11.6–15.1)
ERYTHROCYTE [DISTWIDTH] IN BLOOD BY AUTOMATED COUNT: 21.6 % (ref 11.6–15.1)
ERYTHROCYTE [DISTWIDTH] IN BLOOD BY AUTOMATED COUNT: 22.4 % (ref 11.6–15.1)
ERYTHROCYTE [DISTWIDTH] IN BLOOD BY AUTOMATED COUNT: 22.5 % (ref 11.6–15.1)
EST. AVERAGE GLUCOSE BLD GHB EST-MCNC: 105 MG/DL
EST. AVERAGE GLUCOSE BLD GHB EST-MCNC: 111 MG/DL
EST. AVERAGE GLUCOSE BLD GHB EST-MCNC: 111 MG/DL
EST. AVERAGE GLUCOSE BLD GHB EST-MCNC: 131 MG/DL
FERRITIN SERPL-MCNC: 100 NG/ML (ref 8–388)
FERRITIN SERPL-MCNC: 337 NG/ML (ref 8–388)
FERRITIN SERPL-MCNC: 49 NG/ML (ref 8–388)
FERRITIN SERPL-MCNC: 64 NG/ML (ref 8–388)
FLUAV RNA RESP QL NAA+PROBE: NEGATIVE
FLUAV RNA RESP QL NAA+PROBE: NEGATIVE
FLUBV RNA RESP QL NAA+PROBE: NEGATIVE
FLUBV RNA RESP QL NAA+PROBE: NEGATIVE
FOLATE SERPL-MCNC: 9.1 NG/ML (ref 3.1–17.5)
FUNGUS SPEC CULT: NORMAL
GAMMA GLOB ABNORMAL SERPL ELPH-MCNC: 1.34 G/DL (ref 0.5–1.6)
GAMMA GLOB SERPL ELPH-MCNC: 20 % (ref 12–22)
GFR SERPL CREATININE-BSD FRML MDRD: 23 ML/MIN/1.73SQ M
GFR SERPL CREATININE-BSD FRML MDRD: 24 ML/MIN/1.73SQ M
GFR SERPL CREATININE-BSD FRML MDRD: 24 ML/MIN/1.73SQ M
GFR SERPL CREATININE-BSD FRML MDRD: 25 ML/MIN/1.73SQ M
GFR SERPL CREATININE-BSD FRML MDRD: 25 ML/MIN/1.73SQ M
GFR SERPL CREATININE-BSD FRML MDRD: 26 ML/MIN/1.73SQ M
GFR SERPL CREATININE-BSD FRML MDRD: 27 ML/MIN/1.73SQ M
GFR SERPL CREATININE-BSD FRML MDRD: 28 ML/MIN/1.73SQ M
GFR SERPL CREATININE-BSD FRML MDRD: 29 ML/MIN/1.73SQ M
GFR SERPL CREATININE-BSD FRML MDRD: 32 ML/MIN/1.73SQ M
GFR SERPL CREATININE-BSD FRML MDRD: 33 ML/MIN/1.73SQ M
GFR SERPL CREATININE-BSD FRML MDRD: 33 ML/MIN/1.73SQ M
GFR SERPL CREATININE-BSD FRML MDRD: 34 ML/MIN/1.73SQ M
GFR SERPL CREATININE-BSD FRML MDRD: 35 ML/MIN/1.73SQ M
GFR SERPL CREATININE-BSD FRML MDRD: 36 ML/MIN/1.73SQ M
GFR SERPL CREATININE-BSD FRML MDRD: 36 ML/MIN/1.73SQ M
GFR SERPL CREATININE-BSD FRML MDRD: 37 ML/MIN/1.73SQ M
GFR SERPL CREATININE-BSD FRML MDRD: 38 ML/MIN/1.73SQ M
GFR SERPL CREATININE-BSD FRML MDRD: 39 ML/MIN/1.73SQ M
GFR SERPL CREATININE-BSD FRML MDRD: 40 ML/MIN/1.73SQ M
GFR SERPL CREATININE-BSD FRML MDRD: 41 ML/MIN/1.73SQ M
GFR SERPL CREATININE-BSD FRML MDRD: 42 ML/MIN/1.73SQ M
GFR SERPL CREATININE-BSD FRML MDRD: 43 ML/MIN/1.73SQ M
GFR SERPL CREATININE-BSD FRML MDRD: 44 ML/MIN/1.73SQ M
GFR SERPL CREATININE-BSD FRML MDRD: 45 ML/MIN/1.73SQ M
GFR SERPL CREATININE-BSD FRML MDRD: 46 ML/MIN/1.73SQ M
GFR SERPL CREATININE-BSD FRML MDRD: 47 ML/MIN/1.73SQ M
GFR SERPL CREATININE-BSD FRML MDRD: 49 ML/MIN/1.73SQ M
GFR SERPL CREATININE-BSD FRML MDRD: 49 ML/MIN/1.73SQ M
GFR SERPL CREATININE-BSD FRML MDRD: 50 ML/MIN/1.73SQ M
GFR SERPL CREATININE-BSD FRML MDRD: 51 ML/MIN/1.73SQ M
GFR SERPL CREATININE-BSD FRML MDRD: 51 ML/MIN/1.73SQ M
GFR SERPL CREATININE-BSD FRML MDRD: 52 ML/MIN/1.73SQ M
GFR SERPL CREATININE-BSD FRML MDRD: 52 ML/MIN/1.73SQ M
GFR SERPL CREATININE-BSD FRML MDRD: 53 ML/MIN/1.73SQ M
GFR SERPL CREATININE-BSD FRML MDRD: 54 ML/MIN/1.73SQ M
GFR SERPL CREATININE-BSD FRML MDRD: 56 ML/MIN/1.73SQ M
GFR SERPL CREATININE-BSD FRML MDRD: 60 ML/MIN/1.73SQ M
GFR SERPL CREATININE-BSD FRML MDRD: 62 ML/MIN/1.73SQ M
GFR SERPL CREATININE-BSD FRML MDRD: 66 ML/MIN/1.73SQ M
GLUCOSE FLD-MCNC: 141 MG/DL
GLUCOSE FLD-MCNC: 55 MG/DL
GLUCOSE P FAST SERPL-MCNC: 107 MG/DL (ref 65–99)
GLUCOSE P FAST SERPL-MCNC: 127 MG/DL (ref 65–99)
GLUCOSE P FAST SERPL-MCNC: 132 MG/DL (ref 65–99)
GLUCOSE P FAST SERPL-MCNC: 136 MG/DL (ref 65–99)
GLUCOSE P FAST SERPL-MCNC: 155 MG/DL (ref 65–99)
GLUCOSE SERPL-MCNC: 100 MG/DL (ref 65–140)
GLUCOSE SERPL-MCNC: 101 MG/DL (ref 65–140)
GLUCOSE SERPL-MCNC: 102 MG/DL (ref 65–140)
GLUCOSE SERPL-MCNC: 103 MG/DL (ref 65–140)
GLUCOSE SERPL-MCNC: 103 MG/DL (ref 65–140)
GLUCOSE SERPL-MCNC: 104 MG/DL (ref 65–140)
GLUCOSE SERPL-MCNC: 105 MG/DL (ref 65–140)
GLUCOSE SERPL-MCNC: 106 MG/DL (ref 65–140)
GLUCOSE SERPL-MCNC: 106 MG/DL (ref 65–140)
GLUCOSE SERPL-MCNC: 107 MG/DL (ref 65–140)
GLUCOSE SERPL-MCNC: 108 MG/DL (ref 65–140)
GLUCOSE SERPL-MCNC: 109 MG/DL (ref 65–140)
GLUCOSE SERPL-MCNC: 110 MG/DL (ref 65–140)
GLUCOSE SERPL-MCNC: 111 MG/DL (ref 65–140)
GLUCOSE SERPL-MCNC: 111 MG/DL (ref 65–140)
GLUCOSE SERPL-MCNC: 112 MG/DL (ref 65–140)
GLUCOSE SERPL-MCNC: 113 MG/DL (ref 65–140)
GLUCOSE SERPL-MCNC: 113 MG/DL (ref 65–140)
GLUCOSE SERPL-MCNC: 114 MG/DL (ref 65–140)
GLUCOSE SERPL-MCNC: 114 MG/DL (ref 65–140)
GLUCOSE SERPL-MCNC: 115 MG/DL (ref 65–140)
GLUCOSE SERPL-MCNC: 115 MG/DL (ref 65–140)
GLUCOSE SERPL-MCNC: 116 MG/DL (ref 65–140)
GLUCOSE SERPL-MCNC: 117 MG/DL (ref 65–140)
GLUCOSE SERPL-MCNC: 118 MG/DL (ref 65–140)
GLUCOSE SERPL-MCNC: 119 MG/DL (ref 65–140)
GLUCOSE SERPL-MCNC: 120 MG/DL (ref 65–140)
GLUCOSE SERPL-MCNC: 121 MG/DL (ref 65–140)
GLUCOSE SERPL-MCNC: 122 MG/DL (ref 65–140)
GLUCOSE SERPL-MCNC: 123 MG/DL (ref 65–140)
GLUCOSE SERPL-MCNC: 124 MG/DL (ref 65–140)
GLUCOSE SERPL-MCNC: 125 MG/DL (ref 65–140)
GLUCOSE SERPL-MCNC: 126 MG/DL (ref 65–140)
GLUCOSE SERPL-MCNC: 127 MG/DL (ref 65–140)
GLUCOSE SERPL-MCNC: 128 MG/DL (ref 65–140)
GLUCOSE SERPL-MCNC: 129 MG/DL (ref 65–140)
GLUCOSE SERPL-MCNC: 130 MG/DL (ref 65–140)
GLUCOSE SERPL-MCNC: 131 MG/DL (ref 65–140)
GLUCOSE SERPL-MCNC: 132 MG/DL (ref 65–140)
GLUCOSE SERPL-MCNC: 133 MG/DL (ref 65–140)
GLUCOSE SERPL-MCNC: 134 MG/DL (ref 65–140)
GLUCOSE SERPL-MCNC: 135 MG/DL (ref 65–140)
GLUCOSE SERPL-MCNC: 136 MG/DL (ref 65–140)
GLUCOSE SERPL-MCNC: 136 MG/DL (ref 65–140)
GLUCOSE SERPL-MCNC: 137 MG/DL (ref 65–140)
GLUCOSE SERPL-MCNC: 138 MG/DL (ref 65–140)
GLUCOSE SERPL-MCNC: 138 MG/DL (ref 65–140)
GLUCOSE SERPL-MCNC: 139 MG/DL (ref 65–140)
GLUCOSE SERPL-MCNC: 140 MG/DL (ref 65–140)
GLUCOSE SERPL-MCNC: 141 MG/DL (ref 65–140)
GLUCOSE SERPL-MCNC: 142 MG/DL (ref 65–140)
GLUCOSE SERPL-MCNC: 143 MG/DL (ref 65–140)
GLUCOSE SERPL-MCNC: 143 MG/DL (ref 65–140)
GLUCOSE SERPL-MCNC: 144 MG/DL (ref 65–140)
GLUCOSE SERPL-MCNC: 146 MG/DL (ref 65–140)
GLUCOSE SERPL-MCNC: 147 MG/DL (ref 65–140)
GLUCOSE SERPL-MCNC: 148 MG/DL (ref 65–140)
GLUCOSE SERPL-MCNC: 148 MG/DL (ref 65–140)
GLUCOSE SERPL-MCNC: 149 MG/DL (ref 65–140)
GLUCOSE SERPL-MCNC: 149 MG/DL (ref 65–140)
GLUCOSE SERPL-MCNC: 150 MG/DL (ref 65–140)
GLUCOSE SERPL-MCNC: 151 MG/DL (ref 65–140)
GLUCOSE SERPL-MCNC: 152 MG/DL (ref 65–140)
GLUCOSE SERPL-MCNC: 153 MG/DL (ref 65–140)
GLUCOSE SERPL-MCNC: 154 MG/DL (ref 65–140)
GLUCOSE SERPL-MCNC: 155 MG/DL (ref 65–140)
GLUCOSE SERPL-MCNC: 156 MG/DL (ref 65–140)
GLUCOSE SERPL-MCNC: 156 MG/DL (ref 65–140)
GLUCOSE SERPL-MCNC: 157 MG/DL (ref 65–140)
GLUCOSE SERPL-MCNC: 159 MG/DL (ref 65–140)
GLUCOSE SERPL-MCNC: 160 MG/DL (ref 65–140)
GLUCOSE SERPL-MCNC: 161 MG/DL (ref 65–140)
GLUCOSE SERPL-MCNC: 161 MG/DL (ref 65–140)
GLUCOSE SERPL-MCNC: 162 MG/DL (ref 65–140)
GLUCOSE SERPL-MCNC: 162 MG/DL (ref 65–140)
GLUCOSE SERPL-MCNC: 163 MG/DL (ref 65–140)
GLUCOSE SERPL-MCNC: 164 MG/DL (ref 65–140)
GLUCOSE SERPL-MCNC: 164 MG/DL (ref 65–140)
GLUCOSE SERPL-MCNC: 166 MG/DL (ref 65–140)
GLUCOSE SERPL-MCNC: 166 MG/DL (ref 65–140)
GLUCOSE SERPL-MCNC: 168 MG/DL (ref 65–140)
GLUCOSE SERPL-MCNC: 168 MG/DL (ref 65–140)
GLUCOSE SERPL-MCNC: 169 MG/DL (ref 65–140)
GLUCOSE SERPL-MCNC: 170 MG/DL (ref 65–140)
GLUCOSE SERPL-MCNC: 171 MG/DL (ref 65–140)
GLUCOSE SERPL-MCNC: 171 MG/DL (ref 65–140)
GLUCOSE SERPL-MCNC: 174 MG/DL (ref 65–140)
GLUCOSE SERPL-MCNC: 175 MG/DL (ref 65–140)
GLUCOSE SERPL-MCNC: 176 MG/DL (ref 65–140)
GLUCOSE SERPL-MCNC: 177 MG/DL (ref 65–140)
GLUCOSE SERPL-MCNC: 178 MG/DL (ref 65–140)
GLUCOSE SERPL-MCNC: 180 MG/DL (ref 65–140)
GLUCOSE SERPL-MCNC: 181 MG/DL (ref 65–140)
GLUCOSE SERPL-MCNC: 183 MG/DL (ref 65–140)
GLUCOSE SERPL-MCNC: 184 MG/DL (ref 65–140)
GLUCOSE SERPL-MCNC: 184 MG/DL (ref 65–140)
GLUCOSE SERPL-MCNC: 195 MG/DL (ref 65–140)
GLUCOSE SERPL-MCNC: 196 MG/DL (ref 65–140)
GLUCOSE SERPL-MCNC: 198 MG/DL (ref 65–140)
GLUCOSE SERPL-MCNC: 204 MG/DL (ref 65–140)
GLUCOSE SERPL-MCNC: 216 MG/DL (ref 65–140)
GLUCOSE SERPL-MCNC: 234 MG/DL (ref 65–140)
GLUCOSE SERPL-MCNC: 251 MG/DL (ref 65–140)
GLUCOSE SERPL-MCNC: 316 MG/DL (ref 65–140)
GLUCOSE SERPL-MCNC: 91 MG/DL (ref 65–140)
GLUCOSE SERPL-MCNC: 94 MG/DL (ref 65–140)
GLUCOSE SERPL-MCNC: 94 MG/DL (ref 65–140)
GLUCOSE SERPL-MCNC: 97 MG/DL (ref 65–140)
GLUCOSE UR STRIP-MCNC: NEGATIVE MG/DL
GRAM STN SPEC: ABNORMAL
GRAM STN SPEC: ABNORMAL
GRAM STN SPEC: NORMAL
HBA1C MFR BLD: 5.3 %
HBA1C MFR BLD: 5.5 %
HBA1C MFR BLD: 5.5 %
HBA1C MFR BLD: 6.2 %
HCO3 BLDA-SCNC: 28.1 MMOL/L (ref 22–28)
HCO3 BLDA-SCNC: 28.9 MMOL/L (ref 22–28)
HCO3 BLDA-SCNC: 30.2 MMOL/L (ref 22–28)
HCO3 BLDA-SCNC: 36.7 MMOL/L (ref 24–30)
HCO3 BLDV-SCNC: 25.3 MMOL/L (ref 24–30)
HCO3 BLDV-SCNC: 26.1 MMOL/L (ref 24–30)
HCT VFR BLD AUTO: 17.9 % (ref 36.5–49.3)
HCT VFR BLD AUTO: 19.9 % (ref 36.5–49.3)
HCT VFR BLD AUTO: 20 % (ref 36.5–49.3)
HCT VFR BLD AUTO: 20.7 % (ref 36.5–49.3)
HCT VFR BLD AUTO: 21.1 % (ref 36.5–49.3)
HCT VFR BLD AUTO: 21.5 % (ref 36.5–49.3)
HCT VFR BLD AUTO: 21.6 % (ref 36.5–49.3)
HCT VFR BLD AUTO: 21.7 % (ref 36.5–49.3)
HCT VFR BLD AUTO: 22.2 % (ref 36.5–49.3)
HCT VFR BLD AUTO: 22.4 % (ref 36.5–49.3)
HCT VFR BLD AUTO: 22.4 % (ref 36.5–49.3)
HCT VFR BLD AUTO: 22.6 % (ref 36.5–49.3)
HCT VFR BLD AUTO: 22.7 % (ref 36.5–49.3)
HCT VFR BLD AUTO: 22.8 % (ref 36.5–49.3)
HCT VFR BLD AUTO: 23.3 % (ref 36.5–49.3)
HCT VFR BLD AUTO: 23.4 % (ref 36.5–49.3)
HCT VFR BLD AUTO: 23.5 % (ref 36.5–49.3)
HCT VFR BLD AUTO: 23.5 % (ref 36.5–49.3)
HCT VFR BLD AUTO: 23.6 % (ref 36.5–49.3)
HCT VFR BLD AUTO: 23.6 % (ref 36.5–49.3)
HCT VFR BLD AUTO: 23.8 % (ref 36.5–49.3)
HCT VFR BLD AUTO: 23.8 % (ref 36.5–49.3)
HCT VFR BLD AUTO: 23.9 % (ref 36.5–49.3)
HCT VFR BLD AUTO: 23.9 % (ref 36.5–49.3)
HCT VFR BLD AUTO: 24 % (ref 36.5–49.3)
HCT VFR BLD AUTO: 24 % (ref 36.5–49.3)
HCT VFR BLD AUTO: 24.2 % (ref 36.5–49.3)
HCT VFR BLD AUTO: 24.3 % (ref 36.5–49.3)
HCT VFR BLD AUTO: 24.5 % (ref 36.5–49.3)
HCT VFR BLD AUTO: 24.5 % (ref 36.5–49.3)
HCT VFR BLD AUTO: 24.6 % (ref 36.5–49.3)
HCT VFR BLD AUTO: 24.7 % (ref 36.5–49.3)
HCT VFR BLD AUTO: 24.7 % (ref 36.5–49.3)
HCT VFR BLD AUTO: 25 % (ref 36.5–49.3)
HCT VFR BLD AUTO: 25 % (ref 36.5–49.3)
HCT VFR BLD AUTO: 25.1 % (ref 36.5–49.3)
HCT VFR BLD AUTO: 25.1 % (ref 36.5–49.3)
HCT VFR BLD AUTO: 25.2 % (ref 36.5–49.3)
HCT VFR BLD AUTO: 25.4 % (ref 36.5–49.3)
HCT VFR BLD AUTO: 25.5 % (ref 36.5–49.3)
HCT VFR BLD AUTO: 25.6 % (ref 36.5–49.3)
HCT VFR BLD AUTO: 25.6 % (ref 36.5–49.3)
HCT VFR BLD AUTO: 25.8 % (ref 36.5–49.3)
HCT VFR BLD AUTO: 25.9 % (ref 36.5–49.3)
HCT VFR BLD AUTO: 25.9 % (ref 36.5–49.3)
HCT VFR BLD AUTO: 26.1 % (ref 36.5–49.3)
HCT VFR BLD AUTO: 26.2 % (ref 36.5–49.3)
HCT VFR BLD AUTO: 26.2 % (ref 36.5–49.3)
HCT VFR BLD AUTO: 26.5 % (ref 36.5–49.3)
HCT VFR BLD AUTO: 26.7 % (ref 36.5–49.3)
HCT VFR BLD AUTO: 26.7 % (ref 36.5–49.3)
HCT VFR BLD AUTO: 26.9 % (ref 36.5–49.3)
HCT VFR BLD AUTO: 26.9 % (ref 36.5–49.3)
HCT VFR BLD AUTO: 27 % (ref 36.5–49.3)
HCT VFR BLD AUTO: 27 % (ref 36.5–49.3)
HCT VFR BLD AUTO: 27.3 % (ref 36.5–49.3)
HCT VFR BLD AUTO: 27.6 % (ref 36.5–49.3)
HCT VFR BLD AUTO: 28.1 % (ref 36.5–49.3)
HCT VFR BLD AUTO: 28.1 % (ref 36.5–49.3)
HCT VFR BLD AUTO: 28.2 % (ref 36.5–49.3)
HCT VFR BLD AUTO: 28.3 % (ref 36.5–49.3)
HCT VFR BLD AUTO: 28.4 % (ref 36.5–49.3)
HCT VFR BLD AUTO: 29.3 % (ref 36.5–49.3)
HCT VFR BLD AUTO: 29.5 % (ref 36.5–49.3)
HCT VFR BLD AUTO: 30.4 % (ref 36.5–49.3)
HCT VFR BLD AUTO: 31 % (ref 36.5–49.3)
HCT VFR BLD AUTO: 31.1 % (ref 36.5–49.3)
HCT VFR BLD AUTO: 31.2 % (ref 36.5–49.3)
HCT VFR BLD AUTO: 31.3 % (ref 36.5–49.3)
HCT VFR BLD AUTO: 31.3 % (ref 36.5–49.3)
HCT VFR BLD AUTO: 31.4 % (ref 36.5–49.3)
HCT VFR BLD AUTO: 31.6 % (ref 36.5–49.3)
HCT VFR BLD AUTO: 31.8 % (ref 36.5–49.3)
HCT VFR BLD AUTO: 31.8 % (ref 36.5–49.3)
HCT VFR BLD AUTO: 31.9 % (ref 36.5–49.3)
HCT VFR BLD AUTO: 32 % (ref 36.5–49.3)
HCT VFR BLD AUTO: 32.1 % (ref 36.5–49.3)
HCT VFR BLD AUTO: 32.2 % (ref 36.5–49.3)
HCT VFR BLD AUTO: 32.2 % (ref 36.5–49.3)
HCT VFR BLD AUTO: 32.4 % (ref 36.5–49.3)
HCT VFR BLD AUTO: 32.6 % (ref 36.5–49.3)
HCT VFR BLD CALC: 21 % (ref 36.5–49.3)
HCT VFR BLD CALC: 22 % (ref 36.5–49.3)
HCT VFR BLD CALC: 23 % (ref 36.5–49.3)
HCT VFR BLD CALC: 23 % (ref 36.5–49.3)
HEMOCCULT SP1 STL QL: NEGATIVE
HEMOCCULT SP2 STL QL: NORMAL
HEMOCCULT SP3 STL QL: NORMAL
HEMOCCULT STL QL: NEGATIVE
HEMOCCULT STL QL: NORMAL
HGB BLD-MCNC: 5.6 G/DL (ref 12–17)
HGB BLD-MCNC: 6.3 G/DL (ref 12–17)
HGB BLD-MCNC: 6.3 G/DL (ref 12–17)
HGB BLD-MCNC: 6.4 G/DL (ref 12–17)
HGB BLD-MCNC: 6.6 G/DL (ref 12–17)
HGB BLD-MCNC: 6.6 G/DL (ref 12–17)
HGB BLD-MCNC: 6.8 G/DL (ref 12–17)
HGB BLD-MCNC: 7 G/DL (ref 12–17)
HGB BLD-MCNC: 7 G/DL (ref 12–17)
HGB BLD-MCNC: 7.1 G/DL (ref 12–17)
HGB BLD-MCNC: 7.2 G/DL (ref 12–17)
HGB BLD-MCNC: 7.3 G/DL (ref 12–17)
HGB BLD-MCNC: 7.4 G/DL (ref 12–17)
HGB BLD-MCNC: 7.5 G/DL (ref 12–17)
HGB BLD-MCNC: 7.6 G/DL (ref 12–17)
HGB BLD-MCNC: 7.7 G/DL (ref 12–17)
HGB BLD-MCNC: 7.8 G/DL (ref 12–17)
HGB BLD-MCNC: 7.9 G/DL (ref 12–17)
HGB BLD-MCNC: 8 G/DL (ref 12–17)
HGB BLD-MCNC: 8.1 G/DL (ref 12–17)
HGB BLD-MCNC: 8.2 G/DL (ref 12–17)
HGB BLD-MCNC: 8.3 G/DL (ref 12–17)
HGB BLD-MCNC: 8.4 G/DL (ref 12–17)
HGB BLD-MCNC: 8.6 G/DL (ref 12–17)
HGB BLD-MCNC: 8.8 G/DL (ref 12–17)
HGB BLD-MCNC: 8.9 G/DL (ref 12–17)
HGB BLD-MCNC: 9 G/DL (ref 12–17)
HGB BLD-MCNC: 9.1 G/DL (ref 12–17)
HGB BLD-MCNC: 9.2 G/DL (ref 12–17)
HGB BLD-MCNC: 9.2 G/DL (ref 12–17)
HGB BLD-MCNC: 9.3 G/DL (ref 12–17)
HGB BLD-MCNC: 9.4 G/DL (ref 12–17)
HGB BLD-MCNC: 9.5 G/DL (ref 12–17)
HGB BLD-MCNC: 9.6 G/DL (ref 12–17)
HGB BLDA-MCNC: 7.1 G/DL (ref 12–17)
HGB BLDA-MCNC: 7.5 G/DL (ref 12–17)
HGB BLDA-MCNC: 7.8 G/DL (ref 12–17)
HGB BLDA-MCNC: 7.8 G/DL (ref 12–17)
HGB UR QL STRIP.AUTO: ABNORMAL
HGB UR QL STRIP.AUTO: NEGATIVE
HISTIOCYTES NFR FLD: 76 %
HISTIOCYTES NFR FLD: 8 %
HYALINE CASTS #/AREA URNS LPF: ABNORMAL /LPF
HYALINE CASTS #/AREA URNS LPF: ABNORMAL /LPF
HYPERCHROMIA BLD QL SMEAR: PRESENT
IGA SERPL-MCNC: 610 MG/DL (ref 70–400)
IGG SERPL-MCNC: 1160 MG/DL (ref 700–1600)
IGG/ALB SER: 0.81 {RATIO} (ref 1.1–1.8)
IGM SERPL-MCNC: 134 MG/DL (ref 40–230)
IMM GRANULOCYTES # BLD AUTO: 0.01 THOUSAND/UL (ref 0–0.2)
IMM GRANULOCYTES # BLD AUTO: 0.02 THOUSAND/UL (ref 0–0.2)
IMM GRANULOCYTES # BLD AUTO: 0.03 THOUSAND/UL (ref 0–0.2)
IMM GRANULOCYTES # BLD AUTO: 0.04 THOUSAND/UL (ref 0–0.2)
IMM GRANULOCYTES # BLD AUTO: 0.05 THOUSAND/UL (ref 0–0.2)
IMM GRANULOCYTES # BLD AUTO: 0.06 THOUSAND/UL (ref 0–0.2)
IMM GRANULOCYTES # BLD AUTO: 0.07 THOUSAND/UL (ref 0–0.2)
IMM GRANULOCYTES # BLD AUTO: 0.09 THOUSAND/UL (ref 0–0.2)
IMM GRANULOCYTES # BLD AUTO: 0.1 THOUSAND/UL (ref 0–0.2)
IMM GRANULOCYTES # BLD AUTO: 0.12 THOUSAND/UL (ref 0–0.2)
IMM GRANULOCYTES # BLD AUTO: 0.14 THOUSAND/UL (ref 0–0.2)
IMM GRANULOCYTES # BLD AUTO: 0.22 THOUSAND/UL (ref 0–0.2)
IMM GRANULOCYTES # BLD AUTO: 0.23 THOUSAND/UL (ref 0–0.2)
IMM GRANULOCYTES # BLD AUTO: 0.29 THOUSAND/UL (ref 0–0.2)
IMM GRANULOCYTES NFR BLD AUTO: 0 % (ref 0–2)
IMM GRANULOCYTES NFR BLD AUTO: 1 % (ref 0–2)
IMM GRANULOCYTES NFR BLD AUTO: 2 % (ref 0–2)
INR PPP: 1.11 (ref 0.84–1.19)
INR PPP: 1.11 (ref 0.84–1.19)
INR PPP: 1.18 (ref 0.84–1.19)
INR PPP: 1.2 (ref 0.84–1.19)
INR PPP: 1.25 (ref 0.84–1.19)
INR PPP: 1.27 (ref 0.84–1.19)
INR PPP: 1.36 (ref 0.84–1.19)
INR PPP: 1.37 (ref 0.84–1.19)
INR PPP: 1.37 (ref 0.84–1.19)
INR PPP: 1.44 (ref 0.84–1.19)
INR PPP: 1.61 (ref 0.84–1.19)
INR PPP: 1.64 (ref 0.84–1.19)
INR PPP: 1.69 (ref 0.84–1.19)
INR PPP: 1.69 (ref 0.84–1.19)
INR PPP: 1.77 (ref 0.84–1.19)
INR PPP: 1.81 (ref 0.84–1.19)
INR PPP: 1.83 (ref 0.84–1.19)
INR PPP: 2.07 (ref 0.84–1.19)
INR PPP: 2.09 (ref 0.84–1.19)
INR PPP: 2.16 (ref 0.84–1.19)
INR PPP: 2.2 (ref 0.84–1.19)
INR PPP: 2.32 (ref 0.84–1.19)
INR PPP: 2.32 (ref 0.84–1.19)
INR PPP: 2.34 (ref 0.84–1.19)
INR PPP: 2.36 (ref 0.84–1.19)
INR PPP: 2.36 (ref 0.84–1.19)
INR PPP: 2.37 (ref 0.84–1.19)
INR PPP: 2.38 (ref 0.84–1.19)
INR PPP: 2.39 (ref 0.84–1.19)
INR PPP: 2.4 (ref 0.84–1.19)
INR PPP: 2.41 (ref 0.84–1.19)
INR PPP: 2.41 (ref 0.84–1.19)
INR PPP: 2.48 (ref 0.84–1.19)
INR PPP: 2.53 (ref 0.84–1.19)
INR PPP: 2.53 (ref 0.84–1.19)
INR PPP: 2.54 (ref 0.84–1.19)
INR PPP: 2.56 (ref 0.84–1.19)
INR PPP: 2.6 (ref 0.84–1.19)
INR PPP: 2.63 (ref 0.84–1.19)
INR PPP: 2.79 (ref 0.84–1.19)
INR PPP: 2.81 (ref 0.84–1.19)
INR PPP: 2.86 (ref 0.84–1.19)
INR PPP: 2.86 (ref 0.84–1.19)
INR PPP: 2.9 (ref 0.84–1.19)
INR PPP: 3.03 (ref 0.84–1.19)
INR PPP: 3.15 (ref 0.84–1.19)
INR PPP: 4.07 (ref 0.84–1.19)
INR PPP: 4.2 (ref 0.84–1.19)
INTERPRETATION UR IFE-IMP: NORMAL
IRON SATN MFR SERPL: 10 %
IRON SATN MFR SERPL: 10 % (ref 20–50)
IRON SATN MFR SERPL: 14 % (ref 20–50)
IRON SATN MFR SERPL: 8 %
IRON SERPL-MCNC: 28 UG/DL (ref 65–175)
IRON SERPL-MCNC: 29 UG/DL (ref 65–175)
IRON SERPL-MCNC: 36 UG/DL (ref 65–175)
IRON SERPL-MCNC: 37 UG/DL (ref 65–175)
KAPPA LC FREE SER-MCNC: 179.9 MG/L (ref 3.3–19.4)
KAPPA LC FREE/LAMBDA FREE SER: 1.39 {RATIO} (ref 0.26–1.65)
KETONES UR STRIP-MCNC: ABNORMAL MG/DL
KETONES UR STRIP-MCNC: NEGATIVE MG/DL
LACTATE SERPL-SCNC: 0.9 MMOL/L (ref 0.5–2)
LACTATE SERPL-SCNC: 1.2 MMOL/L (ref 0.5–2)
LACTATE SERPL-SCNC: 1.7 MMOL/L (ref 0.5–2)
LAMBDA LC FREE SERPL-MCNC: 129.5 MG/L (ref 5.7–26.3)
LDH FLD L TO P-CCNC: 262 U/L
LDH FLD L TO P-CCNC: 88 U/L
LDH SERPL-CCNC: 191 U/L (ref 81–234)
LDH SERPL-CCNC: 230 U/L (ref 81–234)
LEUKOCYTE ESTERASE UR QL STRIP: ABNORMAL
LEUKOCYTE ESTERASE UR QL STRIP: NEGATIVE
LIPASE SERPL-CCNC: 163 U/L (ref 73–393)
LYMPHOCYTES # BLD AUTO: 0.28 THOUSANDS/ΜL (ref 0.6–4.47)
LYMPHOCYTES # BLD AUTO: 0.33 THOUSANDS/ΜL (ref 0.6–4.47)
LYMPHOCYTES # BLD AUTO: 0.36 THOUSANDS/ΜL (ref 0.6–4.47)
LYMPHOCYTES # BLD AUTO: 0.36 THOUSANDS/ΜL (ref 0.6–4.47)
LYMPHOCYTES # BLD AUTO: 0.38 THOUSANDS/ΜL (ref 0.6–4.47)
LYMPHOCYTES # BLD AUTO: 0.38 THOUSANDS/ΜL (ref 0.6–4.47)
LYMPHOCYTES # BLD AUTO: 0.39 THOUSANDS/ΜL (ref 0.6–4.47)
LYMPHOCYTES # BLD AUTO: 0.4 THOUSANDS/ΜL (ref 0.6–4.47)
LYMPHOCYTES # BLD AUTO: 0.4 THOUSANDS/ΜL (ref 0.6–4.47)
LYMPHOCYTES # BLD AUTO: 0.41 THOUSANDS/ΜL (ref 0.6–4.47)
LYMPHOCYTES # BLD AUTO: 0.43 THOUSANDS/ΜL (ref 0.6–4.47)
LYMPHOCYTES # BLD AUTO: 0.44 THOUSANDS/ΜL (ref 0.6–4.47)
LYMPHOCYTES # BLD AUTO: 0.45 THOUSANDS/ΜL (ref 0.6–4.47)
LYMPHOCYTES # BLD AUTO: 0.46 THOUSANDS/ΜL (ref 0.6–4.47)
LYMPHOCYTES # BLD AUTO: 0.48 THOUSANDS/ΜL (ref 0.6–4.47)
LYMPHOCYTES # BLD AUTO: 0.5 THOUSANDS/ΜL (ref 0.6–4.47)
LYMPHOCYTES # BLD AUTO: 0.51 THOUSANDS/ΜL (ref 0.6–4.47)
LYMPHOCYTES # BLD AUTO: 0.51 THOUSANDS/ΜL (ref 0.6–4.47)
LYMPHOCYTES # BLD AUTO: 0.53 THOUSANDS/ΜL (ref 0.6–4.47)
LYMPHOCYTES # BLD AUTO: 0.53 THOUSANDS/ΜL (ref 0.6–4.47)
LYMPHOCYTES # BLD AUTO: 0.54 THOUSANDS/ΜL (ref 0.6–4.47)
LYMPHOCYTES # BLD AUTO: 0.54 THOUSANDS/ΜL (ref 0.6–4.47)
LYMPHOCYTES # BLD AUTO: 0.55 THOUSANDS/ΜL (ref 0.6–4.47)
LYMPHOCYTES # BLD AUTO: 0.56 THOUSANDS/ΜL (ref 0.6–4.47)
LYMPHOCYTES # BLD AUTO: 0.6 THOUSANDS/ΜL (ref 0.6–4.47)
LYMPHOCYTES # BLD AUTO: 0.6 THOUSANDS/ΜL (ref 0.6–4.47)
LYMPHOCYTES # BLD AUTO: 0.61 THOUSANDS/ΜL (ref 0.6–4.47)
LYMPHOCYTES # BLD AUTO: 0.62 THOUSANDS/ΜL (ref 0.6–4.47)
LYMPHOCYTES # BLD AUTO: 0.63 THOUSAND/UL (ref 0.6–4.47)
LYMPHOCYTES # BLD AUTO: 0.64 THOUSANDS/ΜL (ref 0.6–4.47)
LYMPHOCYTES # BLD AUTO: 0.64 THOUSANDS/ΜL (ref 0.6–4.47)
LYMPHOCYTES # BLD AUTO: 0.65 THOUSANDS/ΜL (ref 0.6–4.47)
LYMPHOCYTES # BLD AUTO: 0.65 THOUSANDS/ΜL (ref 0.6–4.47)
LYMPHOCYTES # BLD AUTO: 0.66 THOUSANDS/ΜL (ref 0.6–4.47)
LYMPHOCYTES # BLD AUTO: 0.67 THOUSANDS/ΜL (ref 0.6–4.47)
LYMPHOCYTES # BLD AUTO: 0.68 THOUSANDS/ΜL (ref 0.6–4.47)
LYMPHOCYTES # BLD AUTO: 0.7 THOUSANDS/ΜL (ref 0.6–4.47)
LYMPHOCYTES # BLD AUTO: 0.71 THOUSANDS/ΜL (ref 0.6–4.47)
LYMPHOCYTES # BLD AUTO: 0.72 THOUSANDS/ΜL (ref 0.6–4.47)
LYMPHOCYTES # BLD AUTO: 0.73 THOUSANDS/ΜL (ref 0.6–4.47)
LYMPHOCYTES # BLD AUTO: 0.73 THOUSANDS/ΜL (ref 0.6–4.47)
LYMPHOCYTES # BLD AUTO: 0.74 THOUSAND/UL (ref 0.6–4.47)
LYMPHOCYTES # BLD AUTO: 0.78 THOUSANDS/ΜL (ref 0.6–4.47)
LYMPHOCYTES # BLD AUTO: 0.78 THOUSANDS/ΜL (ref 0.6–4.47)
LYMPHOCYTES # BLD AUTO: 0.84 THOUSANDS/ΜL (ref 0.6–4.47)
LYMPHOCYTES # BLD AUTO: 11 % (ref 14–44)
LYMPHOCYTES # BLD AUTO: 4 % (ref 14–44)
LYMPHOCYTES NFR BLD AUTO: 10 % (ref 14–44)
LYMPHOCYTES NFR BLD AUTO: 11 % (ref 14–44)
LYMPHOCYTES NFR BLD AUTO: 12 % (ref 14–44)
LYMPHOCYTES NFR BLD AUTO: 13 % (ref 14–44)
LYMPHOCYTES NFR BLD AUTO: 14 %
LYMPHOCYTES NFR BLD AUTO: 14 %
LYMPHOCYTES NFR BLD AUTO: 14 % (ref 14–44)
LYMPHOCYTES NFR BLD AUTO: 15 % (ref 14–44)
LYMPHOCYTES NFR BLD AUTO: 15 % (ref 14–44)
LYMPHOCYTES NFR BLD AUTO: 16 % (ref 14–44)
LYMPHOCYTES NFR BLD AUTO: 16 % (ref 14–44)
LYMPHOCYTES NFR BLD AUTO: 18 % (ref 14–44)
LYMPHOCYTES NFR BLD AUTO: 19 % (ref 14–44)
LYMPHOCYTES NFR BLD AUTO: 3 % (ref 14–44)
LYMPHOCYTES NFR BLD AUTO: 4 % (ref 14–44)
LYMPHOCYTES NFR BLD AUTO: 5 % (ref 14–44)
LYMPHOCYTES NFR BLD AUTO: 6 % (ref 14–44)
LYMPHOCYTES NFR BLD AUTO: 6 % (ref 14–44)
LYMPHOCYTES NFR BLD AUTO: 7 % (ref 14–44)
LYMPHOCYTES NFR BLD AUTO: 8 % (ref 14–44)
LYMPHOCYTES NFR BLD AUTO: 8 % (ref 14–44)
LYMPHOCYTES NFR BLD AUTO: 9 % (ref 14–44)
Lab: NORMAL
MAGNESIUM SERPL-MCNC: 1.7 MG/DL (ref 1.6–2.6)
MAGNESIUM SERPL-MCNC: 1.8 MG/DL (ref 1.6–2.6)
MAGNESIUM SERPL-MCNC: 1.8 MG/DL (ref 1.6–2.6)
MAGNESIUM SERPL-MCNC: 2 MG/DL (ref 1.6–2.6)
MAGNESIUM SERPL-MCNC: 2.1 MG/DL (ref 1.6–2.6)
MAGNESIUM SERPL-MCNC: 2.2 MG/DL (ref 1.6–2.6)
MAGNESIUM SERPL-MCNC: 2.2 MG/DL (ref 1.6–2.6)
MAGNESIUM SERPL-MCNC: 2.3 MG/DL (ref 1.6–2.6)
MAGNESIUM SERPL-MCNC: 2.4 MG/DL (ref 1.6–2.6)
MAGNESIUM SERPL-MCNC: 2.5 MG/DL (ref 1.6–2.6)
MAGNESIUM SERPL-MCNC: 2.7 MG/DL (ref 1.6–2.6)
MAGNESIUM SERPL-MCNC: 2.8 MG/DL (ref 1.6–2.6)
MAGNESIUM SERPL-MCNC: 3.1 MG/DL (ref 1.6–2.6)
MAGNESIUM SERPL-MCNC: 3.1 MG/DL (ref 1.6–2.6)
MCH RBC QN AUTO: 26.2 PG (ref 26.8–34.3)
MCH RBC QN AUTO: 26.6 PG (ref 26.8–34.3)
MCH RBC QN AUTO: 26.7 PG (ref 26.8–34.3)
MCH RBC QN AUTO: 26.8 PG (ref 26.8–34.3)
MCH RBC QN AUTO: 26.9 PG (ref 26.8–34.3)
MCH RBC QN AUTO: 27 PG (ref 26.8–34.3)
MCH RBC QN AUTO: 27 PG (ref 26.8–34.3)
MCH RBC QN AUTO: 27.1 PG (ref 26.8–34.3)
MCH RBC QN AUTO: 27.1 PG (ref 26.8–34.3)
MCH RBC QN AUTO: 27.2 PG (ref 26.8–34.3)
MCH RBC QN AUTO: 27.2 PG (ref 26.8–34.3)
MCH RBC QN AUTO: 27.3 PG (ref 26.8–34.3)
MCH RBC QN AUTO: 27.5 PG (ref 26.8–34.3)
MCH RBC QN AUTO: 27.7 PG (ref 26.8–34.3)
MCH RBC QN AUTO: 27.9 PG (ref 26.8–34.3)
MCH RBC QN AUTO: 28 PG (ref 26.8–34.3)
MCH RBC QN AUTO: 28.1 PG (ref 26.8–34.3)
MCH RBC QN AUTO: 28.1 PG (ref 26.8–34.3)
MCH RBC QN AUTO: 28.2 PG (ref 26.8–34.3)
MCH RBC QN AUTO: 28.3 PG (ref 26.8–34.3)
MCH RBC QN AUTO: 28.3 PG (ref 26.8–34.3)
MCH RBC QN AUTO: 28.4 PG (ref 26.8–34.3)
MCH RBC QN AUTO: 28.5 PG (ref 26.8–34.3)
MCH RBC QN AUTO: 28.6 PG (ref 26.8–34.3)
MCH RBC QN AUTO: 28.7 PG (ref 26.8–34.3)
MCH RBC QN AUTO: 28.7 PG (ref 26.8–34.3)
MCH RBC QN AUTO: 28.8 PG (ref 26.8–34.3)
MCH RBC QN AUTO: 28.8 PG (ref 26.8–34.3)
MCH RBC QN AUTO: 29 PG (ref 26.8–34.3)
MCH RBC QN AUTO: 29.3 PG (ref 26.8–34.3)
MCH RBC QN AUTO: 29.5 PG (ref 26.8–34.3)
MCH RBC QN AUTO: 29.9 PG (ref 26.8–34.3)
MCH RBC QN AUTO: 30 PG (ref 26.8–34.3)
MCH RBC QN AUTO: 30.1 PG (ref 26.8–34.3)
MCH RBC QN AUTO: 30.1 PG (ref 26.8–34.3)
MCH RBC QN AUTO: 30.2 PG (ref 26.8–34.3)
MCH RBC QN AUTO: 30.3 PG (ref 26.8–34.3)
MCH RBC QN AUTO: 30.4 PG (ref 26.8–34.3)
MCH RBC QN AUTO: 30.5 PG (ref 26.8–34.3)
MCH RBC QN AUTO: 30.6 PG (ref 26.8–34.3)
MCHC RBC AUTO-ENTMCNC: 28.7 G/DL (ref 31.4–37.4)
MCHC RBC AUTO-ENTMCNC: 28.8 G/DL (ref 31.4–37.4)
MCHC RBC AUTO-ENTMCNC: 28.8 G/DL (ref 31.4–37.4)
MCHC RBC AUTO-ENTMCNC: 29.1 G/DL (ref 31.4–37.4)
MCHC RBC AUTO-ENTMCNC: 29.2 G/DL (ref 31.4–37.4)
MCHC RBC AUTO-ENTMCNC: 29.2 G/DL (ref 31.4–37.4)
MCHC RBC AUTO-ENTMCNC: 29.3 G/DL (ref 31.4–37.4)
MCHC RBC AUTO-ENTMCNC: 29.4 G/DL (ref 31.4–37.4)
MCHC RBC AUTO-ENTMCNC: 29.6 G/DL (ref 31.4–37.4)
MCHC RBC AUTO-ENTMCNC: 29.7 G/DL (ref 31.4–37.4)
MCHC RBC AUTO-ENTMCNC: 29.7 G/DL (ref 31.4–37.4)
MCHC RBC AUTO-ENTMCNC: 29.8 G/DL (ref 31.4–37.4)
MCHC RBC AUTO-ENTMCNC: 29.9 G/DL (ref 31.4–37.4)
MCHC RBC AUTO-ENTMCNC: 30 G/DL (ref 31.4–37.4)
MCHC RBC AUTO-ENTMCNC: 30.1 G/DL (ref 31.4–37.4)
MCHC RBC AUTO-ENTMCNC: 30.1 G/DL (ref 31.4–37.4)
MCHC RBC AUTO-ENTMCNC: 30.2 G/DL (ref 31.4–37.4)
MCHC RBC AUTO-ENTMCNC: 30.2 G/DL (ref 31.4–37.4)
MCHC RBC AUTO-ENTMCNC: 30.3 G/DL (ref 31.4–37.4)
MCHC RBC AUTO-ENTMCNC: 30.4 G/DL (ref 31.4–37.4)
MCHC RBC AUTO-ENTMCNC: 30.5 G/DL (ref 31.4–37.4)
MCHC RBC AUTO-ENTMCNC: 30.6 G/DL (ref 31.4–37.4)
MCHC RBC AUTO-ENTMCNC: 30.6 G/DL (ref 31.4–37.4)
MCHC RBC AUTO-ENTMCNC: 30.7 G/DL (ref 31.4–37.4)
MCHC RBC AUTO-ENTMCNC: 30.8 G/DL (ref 31.4–37.4)
MCHC RBC AUTO-ENTMCNC: 30.8 G/DL (ref 31.4–37.4)
MCHC RBC AUTO-ENTMCNC: 30.9 G/DL (ref 31.4–37.4)
MCHC RBC AUTO-ENTMCNC: 31 G/DL (ref 31.4–37.4)
MCHC RBC AUTO-ENTMCNC: 31.1 G/DL (ref 31.4–37.4)
MCHC RBC AUTO-ENTMCNC: 31.1 G/DL (ref 31.4–37.4)
MCHC RBC AUTO-ENTMCNC: 31.3 G/DL (ref 31.4–37.4)
MCHC RBC AUTO-ENTMCNC: 31.4 G/DL (ref 31.4–37.4)
MCHC RBC AUTO-ENTMCNC: 31.5 G/DL (ref 31.4–37.4)
MCHC RBC AUTO-ENTMCNC: 31.7 G/DL (ref 31.4–37.4)
MCHC RBC AUTO-ENTMCNC: 31.8 G/DL (ref 31.4–37.4)
MCHC RBC AUTO-ENTMCNC: 32 G/DL (ref 31.4–37.4)
MCHC RBC AUTO-ENTMCNC: 32.3 G/DL (ref 31.4–37.4)
MCHC RBC AUTO-ENTMCNC: 32.5 G/DL (ref 31.4–37.4)
MCV RBC AUTO: 88 FL (ref 82–98)
MCV RBC AUTO: 89 FL (ref 82–98)
MCV RBC AUTO: 90 FL (ref 82–98)
MCV RBC AUTO: 91 FL (ref 82–98)
MCV RBC AUTO: 92 FL (ref 82–98)
MCV RBC AUTO: 93 FL (ref 82–98)
MCV RBC AUTO: 94 FL (ref 82–98)
MCV RBC AUTO: 95 FL (ref 82–98)
MCV RBC AUTO: 96 FL (ref 82–98)
MCV RBC AUTO: 97 FL (ref 82–98)
MCV RBC AUTO: 98 FL (ref 82–98)
MCV RBC AUTO: 99 FL (ref 82–98)
METAMYELOCYTES NFR BLD MANUAL: 1 % (ref 0–1)
MICROALBUMIN UR-MCNC: 37 MG/L (ref 0–20)
MICROALBUMIN UR-MCNC: 8 MG/L (ref 0–20)
MICROALBUMIN UR-MCNC: 90.7 MG/L (ref 0–20)
MICROALBUMIN/CREAT 24H UR: 19 MG/G CREATININE (ref 0–30)
MICROALBUMIN/CREAT 24H UR: 44 MG/G CREATININE (ref 0–30)
MICROALBUMIN/CREAT 24H UR: 6 MG/G CREATININE (ref 0–30)
MISCELLANEOUS LAB TEST RESULT: NORMAL
MONO+MESO NFR FLD MANUAL: 3 %
MONOCYTES # BLD AUTO: 0.18 THOUSAND/UL (ref 0–1.22)
MONOCYTES # BLD AUTO: 0.25 THOUSAND/ΜL (ref 0.17–1.22)
MONOCYTES # BLD AUTO: 0.26 THOUSAND/ΜL (ref 0.17–1.22)
MONOCYTES # BLD AUTO: 0.27 THOUSAND/ΜL (ref 0.17–1.22)
MONOCYTES # BLD AUTO: 0.29 THOUSAND/ΜL (ref 0.17–1.22)
MONOCYTES # BLD AUTO: 0.32 THOUSAND/ΜL (ref 0.17–1.22)
MONOCYTES # BLD AUTO: 0.33 THOUSAND/ΜL (ref 0.17–1.22)
MONOCYTES # BLD AUTO: 0.35 THOUSAND/UL (ref 0–1.22)
MONOCYTES # BLD AUTO: 0.35 THOUSAND/ΜL (ref 0.17–1.22)
MONOCYTES # BLD AUTO: 0.36 THOUSAND/ΜL (ref 0.17–1.22)
MONOCYTES # BLD AUTO: 0.37 THOUSAND/ΜL (ref 0.17–1.22)
MONOCYTES # BLD AUTO: 0.38 THOUSAND/ΜL (ref 0.17–1.22)
MONOCYTES # BLD AUTO: 0.4 THOUSAND/ΜL (ref 0.17–1.22)
MONOCYTES # BLD AUTO: 0.41 THOUSAND/ΜL (ref 0.17–1.22)
MONOCYTES # BLD AUTO: 0.41 THOUSAND/ΜL (ref 0.17–1.22)
MONOCYTES # BLD AUTO: 0.42 THOUSAND/ΜL (ref 0.17–1.22)
MONOCYTES # BLD AUTO: 0.43 THOUSAND/ΜL (ref 0.17–1.22)
MONOCYTES # BLD AUTO: 0.43 THOUSAND/ΜL (ref 0.17–1.22)
MONOCYTES # BLD AUTO: 0.45 THOUSAND/ΜL (ref 0.17–1.22)
MONOCYTES # BLD AUTO: 0.45 THOUSAND/ΜL (ref 0.17–1.22)
MONOCYTES # BLD AUTO: 0.46 THOUSAND/ΜL (ref 0.17–1.22)
MONOCYTES # BLD AUTO: 0.46 THOUSAND/ΜL (ref 0.17–1.22)
MONOCYTES # BLD AUTO: 0.47 THOUSAND/ΜL (ref 0.17–1.22)
MONOCYTES # BLD AUTO: 0.47 THOUSAND/ΜL (ref 0.17–1.22)
MONOCYTES # BLD AUTO: 0.48 THOUSAND/ΜL (ref 0.17–1.22)
MONOCYTES # BLD AUTO: 0.48 THOUSAND/ΜL (ref 0.17–1.22)
MONOCYTES # BLD AUTO: 0.49 THOUSAND/ΜL (ref 0.17–1.22)
MONOCYTES # BLD AUTO: 0.5 THOUSAND/ΜL (ref 0.17–1.22)
MONOCYTES # BLD AUTO: 0.51 THOUSAND/ΜL (ref 0.17–1.22)
MONOCYTES # BLD AUTO: 0.51 THOUSAND/ΜL (ref 0.17–1.22)
MONOCYTES # BLD AUTO: 0.53 THOUSAND/ΜL (ref 0.17–1.22)
MONOCYTES # BLD AUTO: 0.55 THOUSAND/ΜL (ref 0.17–1.22)
MONOCYTES # BLD AUTO: 0.56 THOUSAND/ΜL (ref 0.17–1.22)
MONOCYTES # BLD AUTO: 0.57 THOUSAND/ΜL (ref 0.17–1.22)
MONOCYTES # BLD AUTO: 0.58 THOUSAND/ΜL (ref 0.17–1.22)
MONOCYTES # BLD AUTO: 0.6 THOUSAND/ΜL (ref 0.17–1.22)
MONOCYTES # BLD AUTO: 0.61 THOUSAND/ΜL (ref 0.17–1.22)
MONOCYTES # BLD AUTO: 0.62 THOUSAND/ΜL (ref 0.17–1.22)
MONOCYTES # BLD AUTO: 0.62 THOUSAND/ΜL (ref 0.17–1.22)
MONOCYTES # BLD AUTO: 0.65 THOUSAND/ΜL (ref 0.17–1.22)
MONOCYTES # BLD AUTO: 0.69 THOUSAND/ΜL (ref 0.17–1.22)
MONOCYTES # BLD AUTO: 0.71 THOUSAND/ΜL (ref 0.17–1.22)
MONOCYTES # BLD AUTO: 0.75 THOUSAND/ΜL (ref 0.17–1.22)
MONOCYTES # BLD AUTO: 0.78 THOUSAND/ΜL (ref 0.17–1.22)
MONOCYTES # BLD AUTO: 0.81 THOUSAND/ΜL (ref 0.17–1.22)
MONOCYTES # BLD AUTO: 0.93 THOUSAND/ΜL (ref 0.17–1.22)
MONOCYTES NFR BLD AUTO: 10 %
MONOCYTES NFR BLD AUTO: 10 % (ref 4–12)
MONOCYTES NFR BLD AUTO: 11 % (ref 4–12)
MONOCYTES NFR BLD AUTO: 12 % (ref 4–12)
MONOCYTES NFR BLD AUTO: 13 % (ref 4–12)
MONOCYTES NFR BLD AUTO: 14 % (ref 4–12)
MONOCYTES NFR BLD AUTO: 14 % (ref 4–12)
MONOCYTES NFR BLD AUTO: 3 % (ref 4–12)
MONOCYTES NFR BLD AUTO: 4 %
MONOCYTES NFR BLD AUTO: 5 % (ref 4–12)
MONOCYTES NFR BLD AUTO: 5 % (ref 4–12)
MONOCYTES NFR BLD AUTO: 6 % (ref 4–12)
MONOCYTES NFR BLD AUTO: 7 % (ref 4–12)
MONOCYTES NFR BLD AUTO: 8 % (ref 4–12)
MONOCYTES NFR BLD AUTO: 9 % (ref 4–12)
MONOCYTES NFR BLD: 1 % (ref 4–12)
MONOCYTES NFR BLD: 6 % (ref 4–12)
MRSA NOSE QL CULT: NORMAL
MYCOBACTERIUM SPEC CULT: NORMAL
NEUTROPHILS # BLD AUTO: 1.16 THOUSANDS/ΜL (ref 1.85–7.62)
NEUTROPHILS # BLD AUTO: 1.24 THOUSANDS/ΜL (ref 1.85–7.62)
NEUTROPHILS # BLD AUTO: 1.82 THOUSANDS/ΜL (ref 1.85–7.62)
NEUTROPHILS # BLD AUTO: 11.58 THOUSANDS/ΜL (ref 1.85–7.62)
NEUTROPHILS # BLD AUTO: 14.29 THOUSANDS/ΜL (ref 1.85–7.62)
NEUTROPHILS # BLD AUTO: 16.34 THOUSANDS/ΜL (ref 1.85–7.62)
NEUTROPHILS # BLD AUTO: 2.46 THOUSANDS/ΜL (ref 1.85–7.62)
NEUTROPHILS # BLD AUTO: 2.47 THOUSANDS/ΜL (ref 1.85–7.62)
NEUTROPHILS # BLD AUTO: 2.97 THOUSANDS/ΜL (ref 1.85–7.62)
NEUTROPHILS # BLD AUTO: 3.06 THOUSANDS/ΜL (ref 1.85–7.62)
NEUTROPHILS # BLD AUTO: 3.1 THOUSANDS/ΜL (ref 1.85–7.62)
NEUTROPHILS # BLD AUTO: 3.18 THOUSANDS/ΜL (ref 1.85–7.62)
NEUTROPHILS # BLD AUTO: 3.21 THOUSANDS/ΜL (ref 1.85–7.62)
NEUTROPHILS # BLD AUTO: 3.25 THOUSANDS/ΜL (ref 1.85–7.62)
NEUTROPHILS # BLD AUTO: 3.29 THOUSANDS/ΜL (ref 1.85–7.62)
NEUTROPHILS # BLD AUTO: 3.34 THOUSANDS/ΜL (ref 1.85–7.62)
NEUTROPHILS # BLD AUTO: 3.41 THOUSANDS/ΜL (ref 1.85–7.62)
NEUTROPHILS # BLD AUTO: 3.45 THOUSANDS/ΜL (ref 1.85–7.62)
NEUTROPHILS # BLD AUTO: 3.47 THOUSANDS/ΜL (ref 1.85–7.62)
NEUTROPHILS # BLD AUTO: 3.47 THOUSANDS/ΜL (ref 1.85–7.62)
NEUTROPHILS # BLD AUTO: 3.48 THOUSANDS/ΜL (ref 1.85–7.62)
NEUTROPHILS # BLD AUTO: 3.51 THOUSANDS/ΜL (ref 1.85–7.62)
NEUTROPHILS # BLD AUTO: 3.57 THOUSANDS/ΜL (ref 1.85–7.62)
NEUTROPHILS # BLD AUTO: 3.58 THOUSANDS/ΜL (ref 1.85–7.62)
NEUTROPHILS # BLD AUTO: 3.73 THOUSANDS/ΜL (ref 1.85–7.62)
NEUTROPHILS # BLD AUTO: 3.84 THOUSANDS/ΜL (ref 1.85–7.62)
NEUTROPHILS # BLD AUTO: 3.89 THOUSANDS/ΜL (ref 1.85–7.62)
NEUTROPHILS # BLD AUTO: 3.91 THOUSANDS/ΜL (ref 1.85–7.62)
NEUTROPHILS # BLD AUTO: 4.06 THOUSANDS/ΜL (ref 1.85–7.62)
NEUTROPHILS # BLD AUTO: 4.14 THOUSANDS/ΜL (ref 1.85–7.62)
NEUTROPHILS # BLD AUTO: 4.23 THOUSANDS/ΜL (ref 1.85–7.62)
NEUTROPHILS # BLD AUTO: 4.33 THOUSANDS/ΜL (ref 1.85–7.62)
NEUTROPHILS # BLD AUTO: 4.35 THOUSANDS/ΜL (ref 1.85–7.62)
NEUTROPHILS # BLD AUTO: 4.39 THOUSANDS/ΜL (ref 1.85–7.62)
NEUTROPHILS # BLD AUTO: 4.44 THOUSANDS/ΜL (ref 1.85–7.62)
NEUTROPHILS # BLD AUTO: 4.46 THOUSANDS/ΜL (ref 1.85–7.62)
NEUTROPHILS # BLD AUTO: 4.51 THOUSANDS/ΜL (ref 1.85–7.62)
NEUTROPHILS # BLD AUTO: 4.53 THOUSANDS/ΜL (ref 1.85–7.62)
NEUTROPHILS # BLD AUTO: 4.58 THOUSANDS/ΜL (ref 1.85–7.62)
NEUTROPHILS # BLD AUTO: 4.6 THOUSANDS/ΜL (ref 1.85–7.62)
NEUTROPHILS # BLD AUTO: 4.63 THOUSANDS/ΜL (ref 1.85–7.62)
NEUTROPHILS # BLD AUTO: 4.68 THOUSANDS/ΜL (ref 1.85–7.62)
NEUTROPHILS # BLD AUTO: 4.69 THOUSANDS/ΜL (ref 1.85–7.62)
NEUTROPHILS # BLD AUTO: 4.79 THOUSANDS/ΜL (ref 1.85–7.62)
NEUTROPHILS # BLD AUTO: 4.89 THOUSANDS/ΜL (ref 1.85–7.62)
NEUTROPHILS # BLD AUTO: 4.9 THOUSANDS/ΜL (ref 1.85–7.62)
NEUTROPHILS # BLD AUTO: 4.96 THOUSANDS/ΜL (ref 1.85–7.62)
NEUTROPHILS # BLD AUTO: 4.99 THOUSANDS/ΜL (ref 1.85–7.62)
NEUTROPHILS # BLD AUTO: 5.11 THOUSANDS/ΜL (ref 1.85–7.62)
NEUTROPHILS # BLD AUTO: 5.29 THOUSANDS/ΜL (ref 1.85–7.62)
NEUTROPHILS # BLD AUTO: 6.2 THOUSANDS/ΜL (ref 1.85–7.62)
NEUTROPHILS # BLD AUTO: 6.24 THOUSANDS/ΜL (ref 1.85–7.62)
NEUTROPHILS # BLD AUTO: 6.42 THOUSANDS/ΜL (ref 1.85–7.62)
NEUTROPHILS # BLD AUTO: 6.52 THOUSANDS/ΜL (ref 1.85–7.62)
NEUTROPHILS # BLD AUTO: 7.05 THOUSANDS/ΜL (ref 1.85–7.62)
NEUTROPHILS # BLD MANUAL: 17.48 THOUSAND/UL (ref 1.85–7.62)
NEUTROPHILS # BLD MANUAL: 4.61 THOUSAND/UL (ref 1.85–7.62)
NEUTS BAND NFR BLD MANUAL: 13 % (ref 0–8)
NEUTS BAND NFR BLD MANUAL: 2 % (ref 0–8)
NEUTS SEG NFR BLD AUTO: 3 %
NEUTS SEG NFR BLD AUTO: 56 % (ref 43–75)
NEUTS SEG NFR BLD AUTO: 57 %
NEUTS SEG NFR BLD AUTO: 57 % (ref 43–75)
NEUTS SEG NFR BLD AUTO: 66 % (ref 43–75)
NEUTS SEG NFR BLD AUTO: 66 % (ref 43–75)
NEUTS SEG NFR BLD AUTO: 67 % (ref 43–75)
NEUTS SEG NFR BLD AUTO: 70 % (ref 43–75)
NEUTS SEG NFR BLD AUTO: 70 % (ref 43–75)
NEUTS SEG NFR BLD AUTO: 71 % (ref 43–75)
NEUTS SEG NFR BLD AUTO: 72 % (ref 43–75)
NEUTS SEG NFR BLD AUTO: 73 % (ref 43–75)
NEUTS SEG NFR BLD AUTO: 74 % (ref 43–75)
NEUTS SEG NFR BLD AUTO: 75 % (ref 43–75)
NEUTS SEG NFR BLD AUTO: 76 % (ref 43–75)
NEUTS SEG NFR BLD AUTO: 77 % (ref 43–75)
NEUTS SEG NFR BLD AUTO: 78 % (ref 43–75)
NEUTS SEG NFR BLD AUTO: 79 % (ref 43–75)
NEUTS SEG NFR BLD AUTO: 79 % (ref 43–75)
NEUTS SEG NFR BLD AUTO: 80 % (ref 43–75)
NEUTS SEG NFR BLD AUTO: 81 % (ref 43–75)
NEUTS SEG NFR BLD AUTO: 82 % (ref 43–75)
NEUTS SEG NFR BLD AUTO: 82 % (ref 43–75)
NEUTS SEG NFR BLD AUTO: 86 % (ref 43–75)
NEUTS SEG NFR BLD AUTO: 89 % (ref 43–75)
NEUTS SEG NFR BLD AUTO: 90 % (ref 43–75)
NEUTS SEG NFR BLD AUTO: 92 % (ref 43–75)
NITRITE UR QL STRIP: NEGATIVE
NON-SQ EPI CELLS URNS QL MICRO: ABNORMAL /HPF
NON-SQ EPI CELLS URNS QL MICRO: NORMAL /HPF
NON-SQ EPI CELLS URNS QL MICRO: NORMAL /HPF
NRBC BLD AUTO-RTO: 0 /100 WBCS
NT-PROBNP SERPL-MCNC: 1505 PG/ML
NT-PROBNP SERPL-MCNC: 2265 PG/ML
NT-PROBNP SERPL-MCNC: 981 PG/ML
O2 CT BLDV-SCNC: 10.2 ML/DL
O2 CT BLDV-SCNC: 9.6 ML/DL
OSMOLALITY UR/SERPL-RTO: 293 MMOL/KG (ref 282–298)
OSMOLALITY UR: 327 MMOL/KG
PCO2 BLD: 29 MMOL/L (ref 21–32)
PCO2 BLD: 30 MMOL/L (ref 21–32)
PCO2 BLD: 31 MMOL/L (ref 21–32)
PCO2 BLD: 38 MMOL/L (ref 21–32)
PCO2 BLD: 39.1 MM HG (ref 36–44)
PCO2 BLD: 40.1 MM HG (ref 36–44)
PCO2 BLD: 42 MM HG (ref 36–44)
PCO2 BLD: 49.3 MM HG (ref 42–50)
PCO2 BLDV: 41.9 MM HG (ref 42–50)
PCO2 BLDV: 47.5 MM HG (ref 42–50)
PH BLD: 7.46 [PH] (ref 7.35–7.45)
PH BLD: 7.46 [PH] (ref 7.35–7.45)
PH BLD: 7.47 [PH] (ref 7.35–7.45)
PH BLD: 7.48 [PH] (ref 7.3–7.4)
PH BLDV: 7.34 [PH] (ref 7.3–7.4)
PH BLDV: 7.41 [PH] (ref 7.3–7.4)
PH BODY FLUID: 7.5
PH BODY FLUID: 8.5
PH UR STRIP.AUTO: 5 [PH]
PH UR STRIP.AUTO: 5 [PH]
PH UR STRIP.AUTO: 5.5 [PH]
PH UR STRIP.AUTO: 6 [PH]
PHOSPHATE SERPL-MCNC: 3.3 MG/DL (ref 2.3–4.1)
PHOSPHATE SERPL-MCNC: 3.4 MG/DL (ref 2.3–4.1)
PHOSPHATE SERPL-MCNC: 3.4 MG/DL (ref 2.3–4.1)
PHOSPHATE SERPL-MCNC: 3.5 MG/DL (ref 2.3–4.1)
PHOSPHATE SERPL-MCNC: 3.7 MG/DL (ref 2.3–4.1)
PHOSPHATE SERPL-MCNC: 3.8 MG/DL (ref 2.3–4.1)
PHOSPHATE SERPL-MCNC: 3.9 MG/DL (ref 2.3–4.1)
PHOSPHATE SERPL-MCNC: 4 MG/DL (ref 2.3–4.1)
PHOSPHATE SERPL-MCNC: 4.2 MG/DL (ref 2.3–4.1)
PHOSPHATE SERPL-MCNC: 4.3 MG/DL (ref 2.3–4.1)
PHOSPHATE SERPL-MCNC: 4.4 MG/DL (ref 2.3–4.1)
PHOSPHATE SERPL-MCNC: 4.7 MG/DL (ref 2.3–4.1)
PHOSPHATE SERPL-MCNC: 5.2 MG/DL (ref 2.3–4.1)
PLATELET # BLD AUTO: 129 THOUSANDS/UL (ref 149–390)
PLATELET # BLD AUTO: 146 THOUSANDS/UL (ref 149–390)
PLATELET # BLD AUTO: 149 THOUSANDS/UL (ref 149–390)
PLATELET # BLD AUTO: 150 THOUSANDS/UL (ref 149–390)
PLATELET # BLD AUTO: 158 THOUSANDS/UL (ref 149–390)
PLATELET # BLD AUTO: 161 THOUSANDS/UL (ref 149–390)
PLATELET # BLD AUTO: 162 THOUSANDS/UL (ref 149–390)
PLATELET # BLD AUTO: 164 THOUSANDS/UL (ref 149–390)
PLATELET # BLD AUTO: 164 THOUSANDS/UL (ref 149–390)
PLATELET # BLD AUTO: 166 THOUSANDS/UL (ref 149–390)
PLATELET # BLD AUTO: 168 THOUSANDS/UL (ref 149–390)
PLATELET # BLD AUTO: 169 THOUSANDS/UL (ref 149–390)
PLATELET # BLD AUTO: 169 THOUSANDS/UL (ref 149–390)
PLATELET # BLD AUTO: 170 THOUSANDS/UL (ref 149–390)
PLATELET # BLD AUTO: 172 THOUSANDS/UL (ref 149–390)
PLATELET # BLD AUTO: 173 THOUSANDS/UL (ref 149–390)
PLATELET # BLD AUTO: 175 THOUSANDS/UL (ref 149–390)
PLATELET # BLD AUTO: 175 THOUSANDS/UL (ref 149–390)
PLATELET # BLD AUTO: 177 THOUSANDS/UL (ref 149–390)
PLATELET # BLD AUTO: 178 THOUSANDS/UL (ref 149–390)
PLATELET # BLD AUTO: 181 THOUSANDS/UL (ref 149–390)
PLATELET # BLD AUTO: 181 THOUSANDS/UL (ref 149–390)
PLATELET # BLD AUTO: 182 THOUSANDS/UL (ref 149–390)
PLATELET # BLD AUTO: 184 THOUSANDS/UL (ref 149–390)
PLATELET # BLD AUTO: 185 THOUSANDS/UL (ref 149–390)
PLATELET # BLD AUTO: 186 THOUSANDS/UL (ref 149–390)
PLATELET # BLD AUTO: 187 THOUSANDS/UL (ref 149–390)
PLATELET # BLD AUTO: 187 THOUSANDS/UL (ref 149–390)
PLATELET # BLD AUTO: 188 THOUSANDS/UL (ref 149–390)
PLATELET # BLD AUTO: 189 THOUSANDS/UL (ref 149–390)
PLATELET # BLD AUTO: 189 THOUSANDS/UL (ref 149–390)
PLATELET # BLD AUTO: 190 THOUSANDS/UL (ref 149–390)
PLATELET # BLD AUTO: 191 THOUSANDS/UL (ref 149–390)
PLATELET # BLD AUTO: 192 THOUSANDS/UL (ref 149–390)
PLATELET # BLD AUTO: 193 THOUSANDS/UL (ref 149–390)
PLATELET # BLD AUTO: 193 THOUSANDS/UL (ref 149–390)
PLATELET # BLD AUTO: 195 THOUSANDS/UL (ref 149–390)
PLATELET # BLD AUTO: 196 THOUSANDS/UL (ref 149–390)
PLATELET # BLD AUTO: 197 THOUSANDS/UL (ref 149–390)
PLATELET # BLD AUTO: 199 THOUSANDS/UL (ref 149–390)
PLATELET # BLD AUTO: 200 THOUSANDS/UL (ref 149–390)
PLATELET # BLD AUTO: 201 THOUSANDS/UL (ref 149–390)
PLATELET # BLD AUTO: 201 THOUSANDS/UL (ref 149–390)
PLATELET # BLD AUTO: 203 THOUSANDS/UL (ref 149–390)
PLATELET # BLD AUTO: 204 THOUSANDS/UL (ref 149–390)
PLATELET # BLD AUTO: 205 THOUSANDS/UL (ref 149–390)
PLATELET # BLD AUTO: 206 THOUSANDS/UL (ref 149–390)
PLATELET # BLD AUTO: 208 THOUSANDS/UL (ref 149–390)
PLATELET # BLD AUTO: 209 THOUSANDS/UL (ref 149–390)
PLATELET # BLD AUTO: 211 THOUSANDS/UL (ref 149–390)
PLATELET # BLD AUTO: 215 THOUSANDS/UL (ref 149–390)
PLATELET # BLD AUTO: 217 THOUSANDS/UL (ref 149–390)
PLATELET # BLD AUTO: 220 THOUSANDS/UL (ref 149–390)
PLATELET # BLD AUTO: 224 THOUSANDS/UL (ref 149–390)
PLATELET # BLD AUTO: 240 THOUSANDS/UL (ref 149–390)
PLATELET # BLD AUTO: 241 THOUSANDS/UL (ref 149–390)
PLATELET # BLD AUTO: 246 THOUSANDS/UL (ref 149–390)
PLATELET # BLD AUTO: 248 THOUSANDS/UL (ref 149–390)
PLATELET # BLD AUTO: 252 THOUSANDS/UL (ref 149–390)
PLATELET # BLD AUTO: 266 THOUSANDS/UL (ref 149–390)
PLATELET # BLD AUTO: 271 THOUSANDS/UL (ref 149–390)
PLATELET # BLD AUTO: 275 THOUSANDS/UL (ref 149–390)
PLATELET # BLD AUTO: 294 THOUSANDS/UL (ref 149–390)
PLATELET BLD QL SMEAR: ADEQUATE
PLATELET BLD QL SMEAR: ADEQUATE
PMV BLD AUTO: 10 FL (ref 8.9–12.7)
PMV BLD AUTO: 10.1 FL (ref 8.9–12.7)
PMV BLD AUTO: 10.2 FL (ref 8.9–12.7)
PMV BLD AUTO: 10.3 FL (ref 8.9–12.7)
PMV BLD AUTO: 10.4 FL (ref 8.9–12.7)
PMV BLD AUTO: 10.5 FL (ref 8.9–12.7)
PMV BLD AUTO: 10.6 FL (ref 8.9–12.7)
PMV BLD AUTO: 10.7 FL (ref 8.9–12.7)
PMV BLD AUTO: 10.9 FL (ref 8.9–12.7)
PMV BLD AUTO: 11 FL (ref 8.9–12.7)
PMV BLD AUTO: 11 FL (ref 8.9–12.7)
PMV BLD AUTO: 11.1 FL (ref 8.9–12.7)
PMV BLD AUTO: 11.2 FL (ref 8.9–12.7)
PMV BLD AUTO: 11.2 FL (ref 8.9–12.7)
PMV BLD AUTO: 11.3 FL (ref 8.9–12.7)
PMV BLD AUTO: 11.4 FL (ref 8.9–12.7)
PMV BLD AUTO: 11.4 FL (ref 8.9–12.7)
PMV BLD AUTO: 11.6 FL (ref 8.9–12.7)
PMV BLD AUTO: 11.8 FL (ref 8.9–12.7)
PMV BLD AUTO: 11.8 FL (ref 8.9–12.7)
PMV BLD AUTO: 9.6 FL (ref 8.9–12.7)
PMV BLD AUTO: 9.7 FL (ref 8.9–12.7)
PMV BLD AUTO: 9.8 FL (ref 8.9–12.7)
PMV BLD AUTO: 9.9 FL (ref 8.9–12.7)
PO2 BLD: 118 MM HG (ref 75–129)
PO2 BLD: 126 MM HG (ref 75–129)
PO2 BLD: 243 MM HG (ref 75–129)
PO2 BLD: 284 MM HG (ref 35–45)
PO2 BLDV: 46.1 MM HG (ref 35–45)
PO2 BLDV: 64.4 MM HG (ref 35–45)
POLYCHROMASIA BLD QL SMEAR: PRESENT
POTASSIUM BLD-SCNC: 2.9 MMOL/L (ref 3.5–5.3)
POTASSIUM BLD-SCNC: 3.8 MMOL/L (ref 3.5–5.3)
POTASSIUM BLD-SCNC: 3.8 MMOL/L (ref 3.5–5.3)
POTASSIUM BLD-SCNC: 3.9 MMOL/L (ref 3.5–5.3)
POTASSIUM SERPL-SCNC: 2.6 MMOL/L (ref 3.5–5.3)
POTASSIUM SERPL-SCNC: 2.6 MMOL/L (ref 3.5–5.3)
POTASSIUM SERPL-SCNC: 2.8 MMOL/L (ref 3.5–5.3)
POTASSIUM SERPL-SCNC: 2.9 MMOL/L (ref 3.5–5.3)
POTASSIUM SERPL-SCNC: 3 MMOL/L (ref 3.5–5.3)
POTASSIUM SERPL-SCNC: 3 MMOL/L (ref 3.5–5.3)
POTASSIUM SERPL-SCNC: 3.1 MMOL/L (ref 3.5–5.3)
POTASSIUM SERPL-SCNC: 3.1 MMOL/L (ref 3.5–5.3)
POTASSIUM SERPL-SCNC: 3.2 MMOL/L (ref 3.5–5.3)
POTASSIUM SERPL-SCNC: 3.3 MMOL/L (ref 3.5–5.3)
POTASSIUM SERPL-SCNC: 3.4 MMOL/L (ref 3.5–5.3)
POTASSIUM SERPL-SCNC: 3.5 MMOL/L (ref 3.5–5.3)
POTASSIUM SERPL-SCNC: 3.6 MMOL/L (ref 3.5–5.3)
POTASSIUM SERPL-SCNC: 3.7 MMOL/L (ref 3.5–5.3)
POTASSIUM SERPL-SCNC: 3.8 MMOL/L (ref 3.5–5.3)
POTASSIUM SERPL-SCNC: 3.9 MMOL/L (ref 3.5–5.3)
POTASSIUM SERPL-SCNC: 3.9 MMOL/L (ref 3.5–5.3)
POTASSIUM SERPL-SCNC: 4 MMOL/L (ref 3.5–5.3)
POTASSIUM SERPL-SCNC: 4.1 MMOL/L (ref 3.5–5.3)
POTASSIUM SERPL-SCNC: 4.2 MMOL/L (ref 3.5–5.3)
POTASSIUM SERPL-SCNC: 4.3 MMOL/L (ref 3.5–5.3)
POTASSIUM SERPL-SCNC: 4.4 MMOL/L (ref 3.5–5.3)
POTASSIUM SERPL-SCNC: 4.5 MMOL/L (ref 3.5–5.3)
POTASSIUM SERPL-SCNC: 4.5 MMOL/L (ref 3.5–5.3)
POTASSIUM SERPL-SCNC: 4.6 MMOL/L (ref 3.5–5.3)
POTASSIUM SERPL-SCNC: 4.6 MMOL/L (ref 3.5–5.3)
POTASSIUM SERPL-SCNC: 4.8 MMOL/L (ref 3.5–5.3)
POTASSIUM SERPL-SCNC: 4.9 MMOL/L (ref 3.5–5.3)
POTASSIUM SERPL-SCNC: 5.4 MMOL/L (ref 3.5–5.3)
POTASSIUM SERPL-SCNC: 5.6 MMOL/L (ref 3.5–5.3)
PREALB SERPL-MCNC: 11.5 MG/DL (ref 18–40)
PROCALCITONIN SERPL-MCNC: 0.21 NG/ML
PROCALCITONIN SERPL-MCNC: 0.27 NG/ML
PROCALCITONIN SERPL-MCNC: 0.37 NG/ML
PROCALCITONIN SERPL-MCNC: 0.53 NG/ML
PROCALCITONIN SERPL-MCNC: 0.67 NG/ML
PROCALCITONIN SERPL-MCNC: 0.95 NG/ML
PROCALCITONIN SERPL-MCNC: 1.51 NG/ML
PROCALCITONIN SERPL-MCNC: 2.59 NG/ML
PROCALCITONIN SERPL-MCNC: 3.89 NG/ML
PROCALCITONIN SERPL-MCNC: 7.42 NG/ML
PROCALCITONIN SERPL-MCNC: 7.87 NG/ML
PROCALCITONIN SERPL-MCNC: 8.77 NG/ML
PROT FLD-MCNC: 2.5 G/DL
PROT FLD-MCNC: 5.1 G/DL
PROT PATTERN SERPL ELPH-IMP: ABNORMAL
PROT SERPL-MCNC: 6.3 G/DL (ref 6.4–8.2)
PROT SERPL-MCNC: 6.4 G/DL (ref 6.4–8.2)
PROT SERPL-MCNC: 6.5 G/DL (ref 6.4–8.2)
PROT SERPL-MCNC: 6.5 G/DL (ref 6.4–8.2)
PROT SERPL-MCNC: 6.6 G/DL (ref 6.4–8.2)
PROT SERPL-MCNC: 6.7 G/DL (ref 6.4–8.2)
PROT SERPL-MCNC: 6.8 G/DL (ref 6.4–8.2)
PROT SERPL-MCNC: 6.9 G/DL (ref 6.4–8.2)
PROT SERPL-MCNC: 6.9 G/DL (ref 6.4–8.2)
PROT SERPL-MCNC: 7.1 G/DL (ref 6.4–8.2)
PROT SERPL-MCNC: 7.2 G/DL (ref 6.4–8.2)
PROT SERPL-MCNC: 7.3 G/DL (ref 6.4–8.2)
PROT SERPL-MCNC: 7.4 G/DL (ref 6.4–8.2)
PROT SERPL-MCNC: 7.5 G/DL (ref 6.4–8.2)
PROT SERPL-MCNC: 7.5 G/DL (ref 6.4–8.2)
PROT SERPL-MCNC: 7.6 G/DL (ref 6.4–8.2)
PROT SERPL-MCNC: 7.7 G/DL (ref 6.4–8.2)
PROT SERPL-MCNC: 7.8 G/DL (ref 6.4–8.2)
PROT SERPL-MCNC: 7.9 G/DL (ref 6.4–8.2)
PROT SERPL-MCNC: 7.9 G/DL (ref 6.4–8.2)
PROT SERPL-MCNC: 8.1 G/DL (ref 6.4–8.2)
PROT SERPL-MCNC: 8.4 G/DL (ref 6.4–8.2)
PROT UR STRIP-MCNC: NEGATIVE MG/DL
PROT UR-MCNC: 82 MG/DL
PROT/CREAT UR: 0.42 MG/G{CREAT} (ref 0–0.1)
PROTHROMBIN TIME: 14.3 SECONDS (ref 11.6–14.5)
PROTHROMBIN TIME: 14.4 SECONDS (ref 11.6–14.5)
PROTHROMBIN TIME: 14.7 SECONDS (ref 11.6–14.5)
PROTHROMBIN TIME: 15 SECONDS (ref 11.6–14.5)
PROTHROMBIN TIME: 15.2 SECONDS (ref 11.6–14.5)
PROTHROMBIN TIME: 15.4 SECONDS (ref 11.6–14.5)
PROTHROMBIN TIME: 16.2 SECONDS (ref 11.6–14.5)
PROTHROMBIN TIME: 16.2 SECONDS (ref 11.6–14.5)
PROTHROMBIN TIME: 16.8 SECONDS (ref 11.6–14.5)
PROTHROMBIN TIME: 17.2 SECONDS (ref 11.6–14.5)
PROTHROMBIN TIME: 18.3 SECONDS (ref 11.6–14.5)
PROTHROMBIN TIME: 18.5 SECONDS (ref 11.6–14.5)
PROTHROMBIN TIME: 19 SECONDS (ref 11.6–14.5)
PROTHROMBIN TIME: 19 SECONDS (ref 11.6–14.5)
PROTHROMBIN TIME: 19.8 SECONDS (ref 11.6–14.5)
PROTHROMBIN TIME: 20 SECONDS (ref 11.6–14.5)
PROTHROMBIN TIME: 20.1 SECONDS (ref 11.6–14.5)
PROTHROMBIN TIME: 22.3 SECONDS (ref 11.6–14.5)
PROTHROMBIN TIME: 22.8 SECONDS (ref 11.6–14.5)
PROTHROMBIN TIME: 23.6 SECONDS (ref 11.6–14.5)
PROTHROMBIN TIME: 24.5 SECONDS (ref 11.6–14.5)
PROTHROMBIN TIME: 25 SECONDS (ref 11.6–14.5)
PROTHROMBIN TIME: 25 SECONDS (ref 11.6–14.5)
PROTHROMBIN TIME: 25.1 SECONDS (ref 11.6–14.5)
PROTHROMBIN TIME: 25.3 SECONDS (ref 11.6–14.5)
PROTHROMBIN TIME: 25.3 SECONDS (ref 11.6–14.5)
PROTHROMBIN TIME: 25.4 SECONDS (ref 11.6–14.5)
PROTHROMBIN TIME: 25.5 SECONDS (ref 11.6–14.5)
PROTHROMBIN TIME: 25.7 SECONDS (ref 11.6–14.5)
PROTHROMBIN TIME: 26.3 SECONDS (ref 11.6–14.5)
PROTHROMBIN TIME: 26.5 SECONDS (ref 11.6–14.5)
PROTHROMBIN TIME: 26.7 SECONDS (ref 11.6–14.5)
PROTHROMBIN TIME: 26.7 SECONDS (ref 11.6–14.5)
PROTHROMBIN TIME: 26.8 SECONDS (ref 11.6–14.5)
PROTHROMBIN TIME: 26.9 SECONDS (ref 11.6–14.5)
PROTHROMBIN TIME: 27.3 SECONDS (ref 11.6–14.5)
PROTHROMBIN TIME: 27.8 SECONDS (ref 11.6–14.5)
PROTHROMBIN TIME: 27.9 SECONDS (ref 11.6–14.5)
PROTHROMBIN TIME: 28.3 SECONDS (ref 11.6–14.5)
PROTHROMBIN TIME: 28.3 SECONDS (ref 11.6–14.5)
PROTHROMBIN TIME: 28.6 SECONDS (ref 11.6–14.5)
PROTHROMBIN TIME: 29.6 SECONDS (ref 11.6–14.5)
PROTHROMBIN TIME: 30.5 SECONDS (ref 11.6–14.5)
PROTHROMBIN TIME: 38.6 SECONDS (ref 11.6–14.5)
PROTHROMBIN TIME: 39.6 SECONDS (ref 11.6–14.5)
PTH RELATED PROT SERPL-SCNC: <2 PMOL/L
PTH-INTACT SERPL-MCNC: 55.1 PG/ML (ref 18.4–80.1)
PTH-INTACT SERPL-MCNC: 70.6 PG/ML (ref 18.4–80.1)
QRS AXIS: 109 DEGREES
QRS AXIS: 123 DEGREES
QRS AXIS: 126 DEGREES
QRS AXIS: 22 DEGREES
QRS AXIS: 26 DEGREES
QRS AXIS: 77 DEGREES
QRS AXIS: 9 DEGREES
QRSD INTERVAL: 100 MS
QRSD INTERVAL: 104 MS
QRSD INTERVAL: 108 MS
QRSD INTERVAL: 116 MS
QRSD INTERVAL: 116 MS
QRSD INTERVAL: 96 MS
QRSD INTERVAL: 98 MS
QT INTERVAL: 278 MS
QT INTERVAL: 322 MS
QT INTERVAL: 332 MS
QT INTERVAL: 350 MS
QT INTERVAL: 358 MS
QT INTERVAL: 378 MS
QT INTERVAL: 388 MS
QTC INTERVAL: 393 MS
QTC INTERVAL: 402 MS
QTC INTERVAL: 444 MS
QTC INTERVAL: 485 MS
QTC INTERVAL: 485 MS
QTC INTERVAL: 515 MS
QTC INTERVAL: 520 MS
RBC # BLD AUTO: 2.01 MILLION/UL (ref 3.88–5.62)
RBC # BLD AUTO: 2.23 MILLION/UL (ref 3.88–5.62)
RBC # BLD AUTO: 2.24 MILLION/UL (ref 3.88–5.62)
RBC # BLD AUTO: 2.29 MILLION/UL (ref 3.88–5.62)
RBC # BLD AUTO: 2.32 MILLION/UL (ref 3.88–5.62)
RBC # BLD AUTO: 2.35 MILLION/UL (ref 3.88–5.62)
RBC # BLD AUTO: 2.35 MILLION/UL (ref 3.88–5.62)
RBC # BLD AUTO: 2.37 MILLION/UL (ref 3.88–5.62)
RBC # BLD AUTO: 2.41 MILLION/UL (ref 3.88–5.62)
RBC # BLD AUTO: 2.41 MILLION/UL (ref 3.88–5.62)
RBC # BLD AUTO: 2.44 MILLION/UL (ref 3.88–5.62)
RBC # BLD AUTO: 2.5 MILLION/UL (ref 3.88–5.62)
RBC # BLD AUTO: 2.51 MILLION/UL (ref 3.88–5.62)
RBC # BLD AUTO: 2.53 MILLION/UL (ref 3.88–5.62)
RBC # BLD AUTO: 2.54 MILLION/UL (ref 3.88–5.62)
RBC # BLD AUTO: 2.55 MILLION/UL (ref 3.88–5.62)
RBC # BLD AUTO: 2.55 MILLION/UL (ref 3.88–5.62)
RBC # BLD AUTO: 2.57 MILLION/UL (ref 3.88–5.62)
RBC # BLD AUTO: 2.57 MILLION/UL (ref 3.88–5.62)
RBC # BLD AUTO: 2.58 MILLION/UL (ref 3.88–5.62)
RBC # BLD AUTO: 2.59 MILLION/UL (ref 3.88–5.62)
RBC # BLD AUTO: 2.59 MILLION/UL (ref 3.88–5.62)
RBC # BLD AUTO: 2.6 MILLION/UL (ref 3.88–5.62)
RBC # BLD AUTO: 2.63 MILLION/UL (ref 3.88–5.62)
RBC # BLD AUTO: 2.63 MILLION/UL (ref 3.88–5.62)
RBC # BLD AUTO: 2.64 MILLION/UL (ref 3.88–5.62)
RBC # BLD AUTO: 2.67 MILLION/UL (ref 3.88–5.62)
RBC # BLD AUTO: 2.67 MILLION/UL (ref 3.88–5.62)
RBC # BLD AUTO: 2.68 MILLION/UL (ref 3.88–5.62)
RBC # BLD AUTO: 2.68 MILLION/UL (ref 3.88–5.62)
RBC # BLD AUTO: 2.7 MILLION/UL (ref 3.88–5.62)
RBC # BLD AUTO: 2.71 MILLION/UL (ref 3.88–5.62)
RBC # BLD AUTO: 2.73 MILLION/UL (ref 3.88–5.62)
RBC # BLD AUTO: 2.76 MILLION/UL (ref 3.88–5.62)
RBC # BLD AUTO: 2.77 MILLION/UL (ref 3.88–5.62)
RBC # BLD AUTO: 2.78 MILLION/UL (ref 3.88–5.62)
RBC # BLD AUTO: 2.79 MILLION/UL (ref 3.88–5.62)
RBC # BLD AUTO: 2.8 MILLION/UL (ref 3.88–5.62)
RBC # BLD AUTO: 2.8 MILLION/UL (ref 3.88–5.62)
RBC # BLD AUTO: 2.82 MILLION/UL (ref 3.88–5.62)
RBC # BLD AUTO: 2.82 MILLION/UL (ref 3.88–5.62)
RBC # BLD AUTO: 2.83 MILLION/UL (ref 3.88–5.62)
RBC # BLD AUTO: 2.84 MILLION/UL (ref 3.88–5.62)
RBC # BLD AUTO: 2.84 MILLION/UL (ref 3.88–5.62)
RBC # BLD AUTO: 2.86 MILLION/UL (ref 3.88–5.62)
RBC # BLD AUTO: 2.86 MILLION/UL (ref 3.88–5.62)
RBC # BLD AUTO: 2.87 MILLION/UL (ref 3.88–5.62)
RBC # BLD AUTO: 2.93 MILLION/UL (ref 3.88–5.62)
RBC # BLD AUTO: 2.98 MILLION/UL (ref 3.88–5.62)
RBC # BLD AUTO: 3.02 MILLION/UL (ref 3.88–5.62)
RBC # BLD AUTO: 3.1 MILLION/UL (ref 3.88–5.62)
RBC # BLD AUTO: 3.27 MILLION/UL (ref 3.88–5.62)
RBC # BLD AUTO: 3.35 MILLION/UL (ref 3.88–5.62)
RBC # BLD AUTO: 3.39 MILLION/UL (ref 3.88–5.62)
RBC # BLD AUTO: 3.39 MILLION/UL (ref 3.88–5.62)
RBC # BLD AUTO: 3.43 MILLION/UL (ref 3.88–5.62)
RBC # BLD AUTO: 3.45 MILLION/UL (ref 3.88–5.62)
RBC # BLD AUTO: 3.45 MILLION/UL (ref 3.88–5.62)
RBC # BLD AUTO: 3.46 MILLION/UL (ref 3.88–5.62)
RBC # BLD AUTO: 3.47 MILLION/UL (ref 3.88–5.62)
RBC # BLD AUTO: 3.5 MILLION/UL (ref 3.88–5.62)
RBC # BLD AUTO: 3.51 MILLION/UL (ref 3.88–5.62)
RBC # BLD AUTO: 3.51 MILLION/UL (ref 3.88–5.62)
RBC # BLD AUTO: 3.52 MILLION/UL (ref 3.88–5.62)
RBC # BLD AUTO: 3.55 MILLION/UL (ref 3.88–5.62)
RBC # BLD AUTO: 3.58 MILLION/UL (ref 3.88–5.62)
RBC # BLD AUTO: 3.6 MILLION/UL (ref 3.88–5.62)
RBC #/AREA URNS AUTO: ABNORMAL /HPF
RBC #/AREA URNS AUTO: NORMAL /HPF
RBC #/AREA URNS AUTO: NORMAL /HPF
RH BLD: POSITIVE
RHODAMINE-AURAMINE STN SPEC: NORMAL
RSV RNA RESP QL NAA+PROBE: NEGATIVE
RSV RNA RESP QL NAA+PROBE: NEGATIVE
SAO2 % BLD FROM PO2: 100 % (ref 60–85)
SAO2 % BLD FROM PO2: 100 % (ref 60–85)
SAO2 % BLD FROM PO2: 99 % (ref 60–85)
SAO2 % BLD FROM PO2: 99 % (ref 60–85)
SARS-COV-2 RNA RESP QL NAA+PROBE: NEGATIVE
SITE: NORMAL
SL AMB POCT INR: 1.4
SL AMB POCT INR: 2.1
SL AMB POCT INR: 2.2
SL AMB POCT INR: 2.4
SL AMB POCT INR: 2.4
SL AMB POCT INR: 2.6
SL AMB POCT INR: 2.8
SL AMB POCT INR: 2.9
SL AMB POCT INR: 3.5
SL AMB POCT INR: 3.5
SL AMB POCT INR: 4.4
SL AMB POCT INR: 5
SODIUM 24H UR-SCNC: 30 MOL/L
SODIUM 24H UR-SCNC: <5 MOL/L
SODIUM 24H UR-SCNC: <5 MOL/L
SODIUM BLD-SCNC: 132 MMOL/L (ref 136–145)
SODIUM BLD-SCNC: 136 MMOL/L (ref 136–145)
SODIUM SERPL-SCNC: 126 MMOL/L (ref 136–145)
SODIUM SERPL-SCNC: 126 MMOL/L (ref 136–145)
SODIUM SERPL-SCNC: 127 MMOL/L (ref 136–145)
SODIUM SERPL-SCNC: 128 MMOL/L (ref 136–145)
SODIUM SERPL-SCNC: 129 MMOL/L (ref 136–145)
SODIUM SERPL-SCNC: 130 MMOL/L (ref 136–145)
SODIUM SERPL-SCNC: 131 MMOL/L (ref 136–145)
SODIUM SERPL-SCNC: 132 MMOL/L (ref 136–145)
SODIUM SERPL-SCNC: 133 MMOL/L (ref 136–145)
SODIUM SERPL-SCNC: 134 MMOL/L (ref 136–145)
SODIUM SERPL-SCNC: 135 MMOL/L (ref 136–145)
SODIUM SERPL-SCNC: 136 MMOL/L (ref 136–145)
SODIUM SERPL-SCNC: 137 MMOL/L (ref 136–145)
SODIUM SERPL-SCNC: 138 MMOL/L (ref 136–145)
SODIUM SERPL-SCNC: 138 MMOL/L (ref 136–145)
SODIUM SERPL-SCNC: 139 MMOL/L (ref 136–145)
SODIUM SERPL-SCNC: 140 MMOL/L (ref 136–145)
SODIUM SERPL-SCNC: 141 MMOL/L (ref 136–145)
SODIUM SERPL-SCNC: 142 MMOL/L (ref 136–145)
SODIUM SERPL-SCNC: 142 MMOL/L (ref 136–145)
SP GR UR STRIP.AUTO: 1.01 (ref 1–1.03)
SP GR UR STRIP.AUTO: 1.02 (ref 1–1.03)
SP GR UR STRIP.AUTO: 1.02 (ref 1–1.03)
SP GR UR STRIP.AUTO: <=1.005 (ref 1–1.03)
SPECIMEN EXPIRATION DATE: NORMAL
SPECIMEN SOURCE: ABNORMAL
STOMATOCYTES BLD QL SMEAR: PRESENT
STONE ANALYSIS-IMP: NORMAL
T WAVE AXIS: -11 DEGREES
T WAVE AXIS: -19 DEGREES
T WAVE AXIS: 37 DEGREES
T WAVE AXIS: 5 DEGREES
T WAVE AXIS: 68 DEGREES
T WAVE AXIS: 68 DEGREES
T WAVE AXIS: 70 DEGREES
TIBC SERPL-MCNC: 201 UG/DL (ref 250–450)
TIBC SERPL-MCNC: 366 UG/DL (ref 250–450)
TIBC SERPL-MCNC: 375 UG/DL (ref 250–450)
TIBC SERPL-MCNC: 385 UG/DL (ref 250–450)
TOTAL CELLS COUNTED SPEC: 100
TOTAL CELLS COUNTED SPEC: 100
TROPONIN I SERPL-MCNC: 0.03 NG/ML
TROPONIN I SERPL-MCNC: 0.03 NG/ML
TROPONIN I SERPL-MCNC: <0.02 NG/ML
TROPONIN I SERPL-MCNC: <0.02 NG/ML
UNIT DISPENSE STATUS: NORMAL
UNIT PRODUCT CODE: NORMAL
UNIT PRODUCT VOLUME: 280 ML
UNIT PRODUCT VOLUME: 300 ML
UNIT PRODUCT VOLUME: 350 ML
UNIT RH: NORMAL
URATE SERPL-MCNC: 9 MG/DL (ref 4.2–8)
UROBILINOGEN UR QL STRIP.AUTO: 0.2 E.U./DL
UROBILINOGEN UR QL STRIP.AUTO: 1 E.U./DL
UROBILINOGEN UR QL STRIP.AUTO: 1 E.U./DL
UUN 24H UR-MCNC: 134 MG/DL
UUN 24H UR-MCNC: 643 MG/DL
VANCOMYCIN SERPL-MCNC: 15.4 UG/ML
VANCOMYCIN SERPL-MCNC: 16.2 UG/ML
VANCOMYCIN SERPL-MCNC: 20.4 UG/ML
VANCOMYCIN SERPL-MCNC: 22.1 UG/ML
VANCOMYCIN SERPL-MCNC: 28.4 UG/ML
VANCOMYCIN SERPL-MCNC: 28.6 UG/ML
VANCOMYCIN SERPL-MCNC: 31.5 UG/ML
VANCOMYCIN TROUGH SERPL-MCNC: 37.1 UG/ML (ref 10–20)
VARIANT LYMPHS # BLD AUTO: 1 %
VENTRICULAR RATE: 106 BPM
VENTRICULAR RATE: 108 BPM
VENTRICULAR RATE: 110 BPM
VENTRICULAR RATE: 126 BPM
VENTRICULAR RATE: 157 BPM
VENTRICULAR RATE: 76 BPM
VENTRICULAR RATE: 99 BPM
VIT B12 SERPL-MCNC: 430 PG/ML (ref 100–900)
VIT B12 SERPL-MCNC: 611 PG/ML (ref 100–900)
WBC # BLD AUTO: 13.3 THOUSAND/UL (ref 4.31–10.16)
WBC # BLD AUTO: 15.98 THOUSAND/UL (ref 4.31–10.16)
WBC # BLD AUTO: 17.97 THOUSAND/UL (ref 4.31–10.16)
WBC # BLD AUTO: 18.4 THOUSAND/UL (ref 4.31–10.16)
WBC # BLD AUTO: 2 THOUSAND/UL (ref 4.31–10.16)
WBC # BLD AUTO: 2.15 THOUSAND/UL (ref 4.31–10.16)
WBC # BLD AUTO: 2.69 THOUSAND/UL (ref 4.31–10.16)
WBC # BLD AUTO: 3.33 THOUSAND/UL (ref 4.31–10.16)
WBC # BLD AUTO: 3.74 THOUSAND/UL (ref 4.31–10.16)
WBC # BLD AUTO: 4.18 THOUSAND/UL (ref 4.31–10.16)
WBC # BLD AUTO: 4.26 THOUSAND/UL (ref 4.31–10.16)
WBC # BLD AUTO: 4.3 THOUSAND/UL (ref 4.31–10.16)
WBC # BLD AUTO: 4.39 THOUSAND/UL (ref 4.31–10.16)
WBC # BLD AUTO: 4.42 THOUSAND/UL (ref 4.31–10.16)
WBC # BLD AUTO: 4.47 THOUSAND/UL (ref 4.31–10.16)
WBC # BLD AUTO: 4.55 THOUSAND/UL (ref 4.31–10.16)
WBC # BLD AUTO: 4.6 THOUSAND/UL (ref 4.31–10.16)
WBC # BLD AUTO: 4.6 THOUSAND/UL (ref 4.31–10.16)
WBC # BLD AUTO: 4.64 THOUSAND/UL (ref 4.31–10.16)
WBC # BLD AUTO: 4.71 THOUSAND/UL (ref 4.31–10.16)
WBC # BLD AUTO: 4.76 THOUSAND/UL (ref 4.31–10.16)
WBC # BLD AUTO: 4.8 THOUSAND/UL (ref 4.31–10.16)
WBC # BLD AUTO: 4.81 THOUSAND/UL (ref 4.31–10.16)
WBC # BLD AUTO: 4.84 THOUSAND/UL (ref 4.31–10.16)
WBC # BLD AUTO: 4.91 THOUSAND/UL (ref 4.31–10.16)
WBC # BLD AUTO: 4.92 THOUSAND/UL (ref 4.31–10.16)
WBC # BLD AUTO: 4.93 THOUSAND/UL (ref 4.31–10.16)
WBC # BLD AUTO: 4.97 THOUSAND/UL (ref 4.31–10.16)
WBC # BLD AUTO: 4.98 THOUSAND/UL (ref 4.31–10.16)
WBC # BLD AUTO: 5.01 THOUSAND/UL (ref 4.31–10.16)
WBC # BLD AUTO: 5.11 THOUSAND/UL (ref 4.31–10.16)
WBC # BLD AUTO: 5.14 THOUSAND/UL (ref 4.31–10.16)
WBC # BLD AUTO: 5.26 THOUSAND/UL (ref 4.31–10.16)
WBC # BLD AUTO: 5.28 THOUSAND/UL (ref 4.31–10.16)
WBC # BLD AUTO: 5.39 THOUSAND/UL (ref 4.31–10.16)
WBC # BLD AUTO: 5.43 THOUSAND/UL (ref 4.31–10.16)
WBC # BLD AUTO: 5.46 THOUSAND/UL (ref 4.31–10.16)
WBC # BLD AUTO: 5.5 THOUSAND/UL (ref 4.31–10.16)
WBC # BLD AUTO: 5.55 THOUSAND/UL (ref 4.31–10.16)
WBC # BLD AUTO: 5.56 THOUSAND/UL (ref 4.31–10.16)
WBC # BLD AUTO: 5.74 THOUSAND/UL (ref 4.31–10.16)
WBC # BLD AUTO: 5.75 THOUSAND/UL (ref 4.31–10.16)
WBC # BLD AUTO: 5.76 THOUSAND/UL (ref 4.31–10.16)
WBC # BLD AUTO: 5.77 THOUSAND/UL (ref 4.31–10.16)
WBC # BLD AUTO: 5.78 THOUSAND/UL (ref 4.31–10.16)
WBC # BLD AUTO: 5.79 THOUSAND/UL (ref 4.31–10.16)
WBC # BLD AUTO: 5.81 THOUSAND/UL (ref 4.31–10.16)
WBC # BLD AUTO: 5.82 THOUSAND/UL (ref 4.31–10.16)
WBC # BLD AUTO: 5.94 THOUSAND/UL (ref 4.31–10.16)
WBC # BLD AUTO: 5.97 THOUSAND/UL (ref 4.31–10.16)
WBC # BLD AUTO: 6 THOUSAND/UL (ref 4.31–10.16)
WBC # BLD AUTO: 6.06 THOUSAND/UL (ref 4.31–10.16)
WBC # BLD AUTO: 6.1 THOUSAND/UL (ref 4.31–10.16)
WBC # BLD AUTO: 6.13 THOUSAND/UL (ref 4.31–10.16)
WBC # BLD AUTO: 6.26 THOUSAND/UL (ref 4.31–10.16)
WBC # BLD AUTO: 6.38 THOUSAND/UL (ref 4.31–10.16)
WBC # BLD AUTO: 6.4 THOUSAND/UL (ref 4.31–10.16)
WBC # BLD AUTO: 6.41 THOUSAND/UL (ref 4.31–10.16)
WBC # BLD AUTO: 6.44 THOUSAND/UL (ref 4.31–10.16)
WBC # BLD AUTO: 6.44 THOUSAND/UL (ref 4.31–10.16)
WBC # BLD AUTO: 6.47 THOUSAND/UL (ref 4.31–10.16)
WBC # BLD AUTO: 6.79 THOUSAND/UL (ref 4.31–10.16)
WBC # BLD AUTO: 7.26 THOUSAND/UL (ref 4.31–10.16)
WBC # BLD AUTO: 7.61 THOUSAND/UL (ref 4.31–10.16)
WBC # BLD AUTO: 7.73 THOUSAND/UL (ref 4.31–10.16)
WBC # BLD AUTO: 7.81 THOUSAND/UL (ref 4.31–10.16)
WBC # BLD AUTO: 7.96 THOUSAND/UL (ref 4.31–10.16)
WBC # BLD AUTO: 8.06 THOUSAND/UL (ref 4.31–10.16)
WBC # BLD AUTO: 8.17 THOUSAND/UL (ref 4.31–10.16)
WBC # BLD AUTO: 8.46 THOUSAND/UL (ref 4.31–10.16)
WBC # BLD AUTO: 8.93 THOUSAND/UL (ref 4.31–10.16)
WBC # FLD MANUAL: 117 /UL
WBC # FLD MANUAL: 931 /UL
WBC #/AREA URNS AUTO: ABNORMAL /HPF
WBC #/AREA URNS AUTO: NORMAL /HPF
WBC #/AREA URNS AUTO: NORMAL /HPF

## 2021-01-01 PROCEDURE — 99232 SBSQ HOSP IP/OBS MODERATE 35: CPT | Performed by: INTERNAL MEDICINE

## 2021-01-01 PROCEDURE — 3060F POS MICROALBUMINURIA REV: CPT | Performed by: INTERNAL MEDICINE

## 2021-01-01 PROCEDURE — 81001 URINALYSIS AUTO W/SCOPE: CPT | Performed by: EMERGENCY MEDICINE

## 2021-01-01 PROCEDURE — 80202 ASSAY OF VANCOMYCIN: CPT | Performed by: INTERNAL MEDICINE

## 2021-01-01 PROCEDURE — 97535 SELF CARE MNGMENT TRAINING: CPT

## 2021-01-01 PROCEDURE — 84145 PROCALCITONIN (PCT): CPT | Performed by: INTERNAL MEDICINE

## 2021-01-01 PROCEDURE — 85025 COMPLETE CBC W/AUTO DIFF WBC: CPT | Performed by: STUDENT IN AN ORGANIZED HEALTH CARE EDUCATION/TRAINING PROGRAM

## 2021-01-01 PROCEDURE — 99214 OFFICE O/P EST MOD 30 MIN: CPT | Performed by: PHYSICIAN ASSISTANT

## 2021-01-01 PROCEDURE — 83550 IRON BINDING TEST: CPT | Performed by: NURSE PRACTITIONER

## 2021-01-01 PROCEDURE — 85018 HEMOGLOBIN: CPT | Performed by: PHYSICIAN ASSISTANT

## 2021-01-01 PROCEDURE — 82945 GLUCOSE OTHER FLUID: CPT | Performed by: PHYSICIAN ASSISTANT

## 2021-01-01 PROCEDURE — 86900 BLOOD TYPING SEROLOGIC ABO: CPT | Performed by: STUDENT IN AN ORGANIZED HEALTH CARE EDUCATION/TRAINING PROGRAM

## 2021-01-01 PROCEDURE — 82803 BLOOD GASES ANY COMBINATION: CPT

## 2021-01-01 PROCEDURE — 82948 REAGENT STRIP/BLOOD GLUCOSE: CPT

## 2021-01-01 PROCEDURE — 85027 COMPLETE CBC AUTOMATED: CPT | Performed by: PHYSICIAN ASSISTANT

## 2021-01-01 PROCEDURE — 85014 HEMATOCRIT: CPT | Performed by: STUDENT IN AN ORGANIZED HEALTH CARE EDUCATION/TRAINING PROGRAM

## 2021-01-01 PROCEDURE — 97530 THERAPEUTIC ACTIVITIES: CPT

## 2021-01-01 PROCEDURE — 36430 TRANSFUSION BLD/BLD COMPNT: CPT

## 2021-01-01 PROCEDURE — 84100 ASSAY OF PHOSPHORUS: CPT | Performed by: INTERNAL MEDICINE

## 2021-01-01 PROCEDURE — 80048 BASIC METABOLIC PNL TOTAL CA: CPT | Performed by: EMERGENCY MEDICINE

## 2021-01-01 PROCEDURE — 80053 COMPREHEN METABOLIC PANEL: CPT

## 2021-01-01 PROCEDURE — 85007 BL SMEAR W/DIFF WBC COUNT: CPT | Performed by: INTERNAL MEDICINE

## 2021-01-01 PROCEDURE — 87070 CULTURE OTHR SPECIMN AEROBIC: CPT | Performed by: PHYSICIAN ASSISTANT

## 2021-01-01 PROCEDURE — 36415 COLL VENOUS BLD VENIPUNCTURE: CPT | Performed by: PHYSICIAN ASSISTANT

## 2021-01-01 PROCEDURE — 85014 HEMATOCRIT: CPT | Performed by: PHYSICIAN ASSISTANT

## 2021-01-01 PROCEDURE — 85610 PROTHROMBIN TIME: CPT | Performed by: INTERNAL MEDICINE

## 2021-01-01 PROCEDURE — 82397 CHEMILUMINESCENT ASSAY: CPT | Performed by: INTERNAL MEDICINE

## 2021-01-01 PROCEDURE — 99214 OFFICE O/P EST MOD 30 MIN: CPT | Performed by: NURSE PRACTITIONER

## 2021-01-01 PROCEDURE — 81001 URINALYSIS AUTO W/SCOPE: CPT | Performed by: INTERNAL MEDICINE

## 2021-01-01 PROCEDURE — 84165 PROTEIN E-PHORESIS SERUM: CPT | Performed by: PATHOLOGY

## 2021-01-01 PROCEDURE — 80053 COMPREHEN METABOLIC PANEL: CPT | Performed by: EMERGENCY MEDICINE

## 2021-01-01 PROCEDURE — C1769 GUIDE WIRE: HCPCS

## 2021-01-01 PROCEDURE — 86920 COMPATIBILITY TEST SPIN: CPT

## 2021-01-01 PROCEDURE — 82043 UR ALBUMIN QUANTITATIVE: CPT

## 2021-01-01 PROCEDURE — 99233 SBSQ HOSP IP/OBS HIGH 50: CPT | Performed by: INTERNAL MEDICINE

## 2021-01-01 PROCEDURE — 82330 ASSAY OF CALCIUM: CPT | Performed by: PHYSICIAN ASSISTANT

## 2021-01-01 PROCEDURE — 80048 BASIC METABOLIC PNL TOTAL CA: CPT | Performed by: PHYSICIAN ASSISTANT

## 2021-01-01 PROCEDURE — 86901 BLOOD TYPING SEROLOGIC RH(D): CPT | Performed by: STUDENT IN AN ORGANIZED HEALTH CARE EDUCATION/TRAINING PROGRAM

## 2021-01-01 PROCEDURE — 85027 COMPLETE CBC AUTOMATED: CPT | Performed by: INTERNAL MEDICINE

## 2021-01-01 PROCEDURE — 88112 CYTOPATH CELL ENHANCE TECH: CPT | Performed by: PATHOLOGY

## 2021-01-01 PROCEDURE — 85610 PROTHROMBIN TIME: CPT | Performed by: EMERGENCY MEDICINE

## 2021-01-01 PROCEDURE — 85025 COMPLETE CBC W/AUTO DIFF WBC: CPT | Performed by: HOSPITALIST

## 2021-01-01 PROCEDURE — 84100 ASSAY OF PHOSPHORUS: CPT

## 2021-01-01 PROCEDURE — 97110 THERAPEUTIC EXERCISES: CPT

## 2021-01-01 PROCEDURE — 85025 COMPLETE CBC W/AUTO DIFF WBC: CPT | Performed by: PHYSICIAN ASSISTANT

## 2021-01-01 PROCEDURE — 84484 ASSAY OF TROPONIN QUANT: CPT | Performed by: EMERGENCY MEDICINE

## 2021-01-01 PROCEDURE — 99223 1ST HOSP IP/OBS HIGH 75: CPT | Performed by: INTERNAL MEDICINE

## 2021-01-01 PROCEDURE — B41CYZZ FLUOROSCOPY OF PELVIC ARTERIES USING OTHER CONTRAST: ICD-10-PCS | Performed by: INTERNAL MEDICINE

## 2021-01-01 PROCEDURE — 83735 ASSAY OF MAGNESIUM: CPT | Performed by: STUDENT IN AN ORGANIZED HEALTH CARE EDUCATION/TRAINING PROGRAM

## 2021-01-01 PROCEDURE — 87206 SMEAR FLUORESCENT/ACID STAI: CPT | Performed by: THORACIC SURGERY (CARDIOTHORACIC VASCULAR SURGERY)

## 2021-01-01 PROCEDURE — 86901 BLOOD TYPING SEROLOGIC RH(D): CPT | Performed by: PHYSICIAN ASSISTANT

## 2021-01-01 PROCEDURE — 99204 OFFICE O/P NEW MOD 45 MIN: CPT | Performed by: NURSE PRACTITIONER

## 2021-01-01 PROCEDURE — 0BJ08ZZ INSPECTION OF TRACHEOBRONCHIAL TREE, VIA NATURAL OR ARTIFICIAL OPENING ENDOSCOPIC: ICD-10-PCS | Performed by: THORACIC SURGERY (CARDIOTHORACIC VASCULAR SURGERY)

## 2021-01-01 PROCEDURE — 97116 GAIT TRAINING THERAPY: CPT

## 2021-01-01 PROCEDURE — 97163 PT EVAL HIGH COMPLEX 45 MIN: CPT

## 2021-01-01 PROCEDURE — 1111F DSCHRG MED/CURRENT MED MERGE: CPT | Performed by: NURSE PRACTITIONER

## 2021-01-01 PROCEDURE — 3008F BODY MASS INDEX DOCD: CPT | Performed by: INTERNAL MEDICINE

## 2021-01-01 PROCEDURE — 99442 PR PHYS/QHP TELEPHONE EVALUATION 11-20 MIN: CPT | Performed by: THORACIC SURGERY (CARDIOTHORACIC VASCULAR SURGERY)

## 2021-01-01 PROCEDURE — 94760 N-INVAS EAR/PLS OXIMETRY 1: CPT

## 2021-01-01 PROCEDURE — 93793 ANTICOAG MGMT PT WARFARIN: CPT | Performed by: PHYSICIAN ASSISTANT

## 2021-01-01 PROCEDURE — 99285 EMERGENCY DEPT VISIT HI MDM: CPT

## 2021-01-01 PROCEDURE — 87077 CULTURE AEROBIC IDENTIFY: CPT | Performed by: INTERNAL MEDICINE

## 2021-01-01 PROCEDURE — 93005 ELECTROCARDIOGRAM TRACING: CPT

## 2021-01-01 PROCEDURE — 80053 COMPREHEN METABOLIC PANEL: CPT | Performed by: INTERNAL MEDICINE

## 2021-01-01 PROCEDURE — P9016 RBC LEUKOCYTES REDUCED: HCPCS

## 2021-01-01 PROCEDURE — 99024 POSTOP FOLLOW-UP VISIT: CPT | Performed by: THORACIC SURGERY (CARDIOTHORACIC VASCULAR SURGERY)

## 2021-01-01 PROCEDURE — 99222 1ST HOSP IP/OBS MODERATE 55: CPT | Performed by: RADIOLOGY

## 2021-01-01 PROCEDURE — 85014 HEMATOCRIT: CPT | Performed by: REGISTERED NURSE

## 2021-01-01 PROCEDURE — NC001 PR NO CHARGE: Performed by: PHYSICIAN ASSISTANT

## 2021-01-01 PROCEDURE — 0BND4ZZ RELEASE RIGHT MIDDLE LUNG LOBE, PERCUTANEOUS ENDOSCOPIC APPROACH: ICD-10-PCS | Performed by: THORACIC SURGERY (CARDIOTHORACIC VASCULAR SURGERY)

## 2021-01-01 PROCEDURE — 84157 ASSAY OF PROTEIN OTHER: CPT | Performed by: INTERNAL MEDICINE

## 2021-01-01 PROCEDURE — 84100 ASSAY OF PHOSPHORUS: CPT | Performed by: NURSE PRACTITIONER

## 2021-01-01 PROCEDURE — 3074F SYST BP LT 130 MM HG: CPT | Performed by: INTERNAL MEDICINE

## 2021-01-01 PROCEDURE — 85018 HEMOGLOBIN: CPT

## 2021-01-01 PROCEDURE — 83615 LACTATE (LD) (LDH) ENZYME: CPT | Performed by: PHYSICIAN ASSISTANT

## 2021-01-01 PROCEDURE — 85730 THROMBOPLASTIN TIME PARTIAL: CPT | Performed by: EMERGENCY MEDICINE

## 2021-01-01 PROCEDURE — 85025 COMPLETE CBC W/AUTO DIFF WBC: CPT | Performed by: INTERNAL MEDICINE

## 2021-01-01 PROCEDURE — 99232 SBSQ HOSP IP/OBS MODERATE 35: CPT | Performed by: PHYSICIAN ASSISTANT

## 2021-01-01 PROCEDURE — 83550 IRON BINDING TEST: CPT

## 2021-01-01 PROCEDURE — 1111F DSCHRG MED/CURRENT MED MERGE: CPT | Performed by: FAMILY MEDICINE

## 2021-01-01 PROCEDURE — 80053 COMPREHEN METABOLIC PANEL: CPT | Performed by: STUDENT IN AN ORGANIZED HEALTH CARE EDUCATION/TRAINING PROGRAM

## 2021-01-01 PROCEDURE — 82436 ASSAY OF URINE CHLORIDE: CPT | Performed by: INTERNAL MEDICINE

## 2021-01-01 PROCEDURE — 80048 BASIC METABOLIC PNL TOTAL CA: CPT | Performed by: INTERNAL MEDICINE

## 2021-01-01 PROCEDURE — 84132 ASSAY OF SERUM POTASSIUM: CPT

## 2021-01-01 PROCEDURE — 84100 ASSAY OF PHOSPHORUS: CPT | Performed by: STUDENT IN AN ORGANIZED HEALTH CARE EDUCATION/TRAINING PROGRAM

## 2021-01-01 PROCEDURE — 80048 BASIC METABOLIC PNL TOTAL CA: CPT | Performed by: SURGERY

## 2021-01-01 PROCEDURE — 85610 PROTHROMBIN TIME: CPT | Performed by: PHYSICIAN ASSISTANT

## 2021-01-01 PROCEDURE — 82570 ASSAY OF URINE CREATININE: CPT | Performed by: NURSE PRACTITIONER

## 2021-01-01 PROCEDURE — 83735 ASSAY OF MAGNESIUM: CPT | Performed by: INTERNAL MEDICINE

## 2021-01-01 PROCEDURE — 4010F ACE/ARB THERAPY RXD/TAKEN: CPT | Performed by: INTERNAL MEDICINE

## 2021-01-01 PROCEDURE — 96374 THER/PROPH/DIAG INJ IV PUSH: CPT

## 2021-01-01 PROCEDURE — 86850 RBC ANTIBODY SCREEN: CPT | Performed by: EMERGENCY MEDICINE

## 2021-01-01 PROCEDURE — 80048 BASIC METABOLIC PNL TOTAL CA: CPT

## 2021-01-01 PROCEDURE — 93010 ELECTROCARDIOGRAM REPORT: CPT | Performed by: INTERNAL MEDICINE

## 2021-01-01 PROCEDURE — 96366 THER/PROPH/DIAG IV INF ADDON: CPT

## 2021-01-01 PROCEDURE — 87176 TISSUE HOMOGENIZATION CULTR: CPT | Performed by: THORACIC SURGERY (CARDIOTHORACIC VASCULAR SURGERY)

## 2021-01-01 PROCEDURE — 99232 SBSQ HOSP IP/OBS MODERATE 35: CPT | Performed by: NURSE PRACTITIONER

## 2021-01-01 PROCEDURE — 82270 OCCULT BLOOD FECES: CPT | Performed by: INTERNAL MEDICINE

## 2021-01-01 PROCEDURE — 82306 VITAMIN D 25 HYDROXY: CPT | Performed by: INTERNAL MEDICINE

## 2021-01-01 PROCEDURE — C1894 INTRO/SHEATH, NON-LASER: HCPCS

## 2021-01-01 PROCEDURE — 87075 CULTR BACTERIA EXCEPT BLOOD: CPT | Performed by: THORACIC SURGERY (CARDIOTHORACIC VASCULAR SURGERY)

## 2021-01-01 PROCEDURE — 82784 ASSAY IGA/IGD/IGG/IGM EACH: CPT

## 2021-01-01 PROCEDURE — 97167 OT EVAL HIGH COMPLEX 60 MIN: CPT

## 2021-01-01 PROCEDURE — 84484 ASSAY OF TROPONIN QUANT: CPT | Performed by: INTERNAL MEDICINE

## 2021-01-01 PROCEDURE — 82728 ASSAY OF FERRITIN: CPT | Performed by: INTERNAL MEDICINE

## 2021-01-01 PROCEDURE — 0241U HB NFCT DS VIR RESP RNA 4 TRGT: CPT | Performed by: PHYSICIAN ASSISTANT

## 2021-01-01 PROCEDURE — 82272 OCCULT BLD FECES 1-3 TESTS: CPT | Performed by: PHYSICIAN ASSISTANT

## 2021-01-01 PROCEDURE — 83735 ASSAY OF MAGNESIUM: CPT

## 2021-01-01 PROCEDURE — 4A133B1 MONITORING OF ARTERIAL PRESSURE, PERIPHERAL, PERCUTANEOUS APPROACH: ICD-10-PCS | Performed by: ANESTHESIOLOGY

## 2021-01-01 PROCEDURE — 85610 PROTHROMBIN TIME: CPT

## 2021-01-01 PROCEDURE — 99232 SBSQ HOSP IP/OBS MODERATE 35: CPT | Performed by: STUDENT IN AN ORGANIZED HEALTH CARE EDUCATION/TRAINING PROGRAM

## 2021-01-01 PROCEDURE — 85610 PROTHROMBIN TIME: CPT | Performed by: SURGERY

## 2021-01-01 PROCEDURE — 99231 SBSQ HOSP IP/OBS SF/LOW 25: CPT | Performed by: STUDENT IN AN ORGANIZED HEALTH CARE EDUCATION/TRAINING PROGRAM

## 2021-01-01 PROCEDURE — 85014 HEMATOCRIT: CPT | Performed by: INTERNAL MEDICINE

## 2021-01-01 PROCEDURE — 84165 PROTEIN E-PHORESIS SERUM: CPT

## 2021-01-01 PROCEDURE — 86900 BLOOD TYPING SEROLOGIC ABO: CPT | Performed by: EMERGENCY MEDICINE

## 2021-01-01 PROCEDURE — 99214 OFFICE O/P EST MOD 30 MIN: CPT | Performed by: INTERNAL MEDICINE

## 2021-01-01 PROCEDURE — 82947 ASSAY GLUCOSE BLOOD QUANT: CPT

## 2021-01-01 PROCEDURE — 99232 SBSQ HOSP IP/OBS MODERATE 35: CPT | Performed by: THORACIC SURGERY (CARDIOTHORACIC VASCULAR SURGERY)

## 2021-01-01 PROCEDURE — 85025 COMPLETE CBC W/AUTO DIFF WBC: CPT | Performed by: EMERGENCY MEDICINE

## 2021-01-01 PROCEDURE — 74176 CT ABD & PELVIS W/O CONTRAST: CPT

## 2021-01-01 PROCEDURE — 85610 PROTHROMBIN TIME: CPT | Performed by: NURSE PRACTITIONER

## 2021-01-01 PROCEDURE — 71045 X-RAY EXAM CHEST 1 VIEW: CPT

## 2021-01-01 PROCEDURE — P9017 PLASMA 1 DONOR FRZ W/IN 8 HR: HCPCS

## 2021-01-01 PROCEDURE — 85018 HEMOGLOBIN: CPT | Performed by: REGISTERED NURSE

## 2021-01-01 PROCEDURE — 83735 ASSAY OF MAGNESIUM: CPT | Performed by: NURSE PRACTITIONER

## 2021-01-01 PROCEDURE — 71046 X-RAY EXAM CHEST 2 VIEWS: CPT

## 2021-01-01 PROCEDURE — 82570 ASSAY OF URINE CREATININE: CPT

## 2021-01-01 PROCEDURE — G1004 CDSM NDSC: HCPCS

## 2021-01-01 PROCEDURE — 30233N1 TRANSFUSION OF NONAUTOLOGOUS RED BLOOD CELLS INTO PERIPHERAL VEIN, PERCUTANEOUS APPROACH: ICD-10-PCS | Performed by: HOSPITALIST

## 2021-01-01 PROCEDURE — 85018 HEMOGLOBIN: CPT | Performed by: EMERGENCY MEDICINE

## 2021-01-01 PROCEDURE — 84540 ASSAY OF URINE/UREA-N: CPT | Performed by: INTERNAL MEDICINE

## 2021-01-01 PROCEDURE — 82668 ASSAY OF ERYTHROPOIETIN: CPT

## 2021-01-01 PROCEDURE — 04VY3DZ RESTRICTION OF LOWER ARTERY WITH INTRALUMINAL DEVICE, PERCUTANEOUS APPROACH: ICD-10-PCS | Performed by: INTERNAL MEDICINE

## 2021-01-01 PROCEDURE — 99495 TRANSJ CARE MGMT MOD F2F 14D: CPT | Performed by: FAMILY MEDICINE

## 2021-01-01 PROCEDURE — 86900 BLOOD TYPING SEROLOGIC ABO: CPT

## 2021-01-01 PROCEDURE — 84145 PROCALCITONIN (PCT): CPT | Performed by: EMERGENCY MEDICINE

## 2021-01-01 PROCEDURE — 86334 IMMUNOFIX E-PHORESIS SERUM: CPT

## 2021-01-01 PROCEDURE — 96375 TX/PRO/DX INJ NEW DRUG ADDON: CPT

## 2021-01-01 PROCEDURE — C1887 CATHETER, GUIDING: HCPCS

## 2021-01-01 PROCEDURE — 94664 DEMO&/EVAL PT USE INHALER: CPT

## 2021-01-01 PROCEDURE — 3E0L3GC INTRODUCTION OF OTHER THERAPEUTIC SUBSTANCE INTO PLEURAL CAVITY, PERCUTANEOUS APPROACH: ICD-10-PCS | Performed by: THORACIC SURGERY (CARDIOTHORACIC VASCULAR SURGERY)

## 2021-01-01 PROCEDURE — 99231 SBSQ HOSP IP/OBS SF/LOW 25: CPT | Performed by: THORACIC SURGERY (CARDIOTHORACIC VASCULAR SURGERY)

## 2021-01-01 PROCEDURE — 93793 ANTICOAG MGMT PT WARFARIN: CPT | Performed by: NURSE PRACTITIONER

## 2021-01-01 PROCEDURE — 36415 COLL VENOUS BLD VENIPUNCTURE: CPT

## 2021-01-01 PROCEDURE — 99232 SBSQ HOSP IP/OBS MODERATE 35: CPT | Performed by: EMERGENCY MEDICINE

## 2021-01-01 PROCEDURE — 94660 CPAP INITIATION&MGMT: CPT

## 2021-01-01 PROCEDURE — 02H633Z INSERTION OF INFUSION DEVICE INTO RIGHT ATRIUM, PERCUTANEOUS APPROACH: ICD-10-PCS | Performed by: EMERGENCY MEDICINE

## 2021-01-01 PROCEDURE — 3044F HG A1C LEVEL LT 7.0%: CPT | Performed by: NURSE PRACTITIONER

## 2021-01-01 PROCEDURE — 83735 ASSAY OF MAGNESIUM: CPT | Performed by: PHYSICIAN ASSISTANT

## 2021-01-01 PROCEDURE — 85025 COMPLETE CBC W/AUTO DIFF WBC: CPT

## 2021-01-01 PROCEDURE — 76770 US EXAM ABDO BACK WALL COMP: CPT

## 2021-01-01 PROCEDURE — 85025 COMPLETE CBC W/AUTO DIFF WBC: CPT | Performed by: SURGERY

## 2021-01-01 PROCEDURE — 86850 RBC ANTIBODY SCREEN: CPT | Performed by: STUDENT IN AN ORGANIZED HEALTH CARE EDUCATION/TRAINING PROGRAM

## 2021-01-01 PROCEDURE — 81003 URINALYSIS AUTO W/O SCOPE: CPT | Performed by: EMERGENCY MEDICINE

## 2021-01-01 PROCEDURE — 3078F DIAST BP <80 MM HG: CPT | Performed by: INTERNAL MEDICINE

## 2021-01-01 PROCEDURE — 80053 COMPREHEN METABOLIC PANEL: CPT | Performed by: PHYSICIAN ASSISTANT

## 2021-01-01 PROCEDURE — 84100 ASSAY OF PHOSPHORUS: CPT | Performed by: PHYSICIAN ASSISTANT

## 2021-01-01 PROCEDURE — 3061F NEG MICROALBUMINURIA REV: CPT | Performed by: INTERNAL MEDICINE

## 2021-01-01 PROCEDURE — 82570 ASSAY OF URINE CREATININE: CPT | Performed by: PHYSICIAN ASSISTANT

## 2021-01-01 PROCEDURE — 80076 HEPATIC FUNCTION PANEL: CPT | Performed by: INTERNAL MEDICINE

## 2021-01-01 PROCEDURE — 85018 HEMOGLOBIN: CPT | Performed by: INTERNAL MEDICINE

## 2021-01-01 PROCEDURE — 82805 BLOOD GASES W/O2 SATURATION: CPT | Performed by: INTERNAL MEDICINE

## 2021-01-01 PROCEDURE — 36415 COLL VENOUS BLD VENIPUNCTURE: CPT | Performed by: EMERGENCY MEDICINE

## 2021-01-01 PROCEDURE — 87070 CULTURE OTHR SPECIMN AEROBIC: CPT | Performed by: THORACIC SURGERY (CARDIOTHORACIC VASCULAR SURGERY)

## 2021-01-01 PROCEDURE — 96365 THER/PROPH/DIAG IV INF INIT: CPT

## 2021-01-01 PROCEDURE — 85018 HEMOGLOBIN: CPT | Performed by: STUDENT IN AN ORGANIZED HEALTH CARE EDUCATION/TRAINING PROGRAM

## 2021-01-01 PROCEDURE — 0W993ZZ DRAINAGE OF RIGHT PLEURAL CAVITY, PERCUTANEOUS APPROACH: ICD-10-PCS | Performed by: RADIOLOGY

## 2021-01-01 PROCEDURE — 83605 ASSAY OF LACTIC ACID: CPT | Performed by: INTERNAL MEDICINE

## 2021-01-01 PROCEDURE — 37243 VASC EMBOLIZE/OCCLUDE ORGAN: CPT

## 2021-01-01 PROCEDURE — 99239 HOSP IP/OBS DSCHRG MGMT >30: CPT | Performed by: INTERNAL MEDICINE

## 2021-01-01 PROCEDURE — C1729 CATH, DRAINAGE: HCPCS

## 2021-01-01 PROCEDURE — 99291 CRITICAL CARE FIRST HOUR: CPT | Performed by: INTERNAL MEDICINE

## 2021-01-01 PROCEDURE — 32557 INSERT CATH PLEURA W/ IMAGE: CPT

## 2021-01-01 PROCEDURE — 82746 ASSAY OF FOLIC ACID SERUM: CPT | Performed by: INTERNAL MEDICINE

## 2021-01-01 PROCEDURE — 83880 ASSAY OF NATRIURETIC PEPTIDE: CPT | Performed by: EMERGENCY MEDICINE

## 2021-01-01 PROCEDURE — 1111F DSCHRG MED/CURRENT MED MERGE: CPT | Performed by: PHYSICIAN ASSISTANT

## 2021-01-01 PROCEDURE — 86901 BLOOD TYPING SEROLOGIC RH(D): CPT

## 2021-01-01 PROCEDURE — 36247 INS CATH ABD/L-EXT ART 3RD: CPT | Performed by: INTERNAL MEDICINE

## 2021-01-01 PROCEDURE — BB4BZZZ ULTRASONOGRAPHY OF PLEURA: ICD-10-PCS | Performed by: RADIOLOGY

## 2021-01-01 PROCEDURE — 86923 COMPATIBILITY TEST ELECTRIC: CPT

## 2021-01-01 PROCEDURE — 99233 SBSQ HOSP IP/OBS HIGH 50: CPT | Performed by: STUDENT IN AN ORGANIZED HEALTH CARE EDUCATION/TRAINING PROGRAM

## 2021-01-01 PROCEDURE — 85014 HEMATOCRIT: CPT | Performed by: HOSPITALIST

## 2021-01-01 PROCEDURE — 99285 EMERGENCY DEPT VISIT HI MDM: CPT | Performed by: PHYSICIAN ASSISTANT

## 2021-01-01 PROCEDURE — 83036 HEMOGLOBIN GLYCOSYLATED A1C: CPT

## 2021-01-01 PROCEDURE — 30233N1 TRANSFUSION OF NONAUTOLOGOUS RED BLOOD CELLS INTO PERIPHERAL VEIN, PERCUTANEOUS APPROACH: ICD-10-PCS | Performed by: STUDENT IN AN ORGANIZED HEALTH CARE EDUCATION/TRAINING PROGRAM

## 2021-01-01 PROCEDURE — 0BNF4ZZ RELEASE RIGHT LOWER LUNG LOBE, PERCUTANEOUS ENDOSCOPIC APPROACH: ICD-10-PCS | Performed by: THORACIC SURGERY (CARDIOTHORACIC VASCULAR SURGERY)

## 2021-01-01 PROCEDURE — 86850 RBC ANTIBODY SCREEN: CPT | Performed by: PHYSICIAN ASSISTANT

## 2021-01-01 PROCEDURE — 36247 INS CATH ABD/L-EXT ART 3RD: CPT

## 2021-01-01 PROCEDURE — 82607 VITAMIN B-12: CPT

## 2021-01-01 PROCEDURE — 87205 SMEAR GRAM STAIN: CPT | Performed by: INTERNAL MEDICINE

## 2021-01-01 PROCEDURE — 82728 ASSAY OF FERRITIN: CPT

## 2021-01-01 PROCEDURE — 84157 ASSAY OF PROTEIN OTHER: CPT | Performed by: PHYSICIAN ASSISTANT

## 2021-01-01 PROCEDURE — 99222 1ST HOSP IP/OBS MODERATE 55: CPT | Performed by: INTERNAL MEDICINE

## 2021-01-01 PROCEDURE — NC001 PR NO CHARGE: Performed by: INTERNAL MEDICINE

## 2021-01-01 PROCEDURE — NC001 PR NO CHARGE: Performed by: RADIOLOGY

## 2021-01-01 PROCEDURE — 83986 ASSAY PH BODY FLUID NOS: CPT | Performed by: INTERNAL MEDICINE

## 2021-01-01 PROCEDURE — 86901 BLOOD TYPING SEROLOGIC RH(D): CPT | Performed by: EMERGENCY MEDICINE

## 2021-01-01 PROCEDURE — 99024 POSTOP FOLLOW-UP VISIT: CPT | Performed by: PHYSICIAN ASSISTANT

## 2021-01-01 PROCEDURE — 88305 TISSUE EXAM BY PATHOLOGIST: CPT | Performed by: PATHOLOGY

## 2021-01-01 PROCEDURE — 84550 ASSAY OF BLOOD/URIC ACID: CPT | Performed by: INTERNAL MEDICINE

## 2021-01-01 PROCEDURE — 83930 ASSAY OF BLOOD OSMOLALITY: CPT | Performed by: PHYSICIAN ASSISTANT

## 2021-01-01 PROCEDURE — 80048 BASIC METABOLIC PNL TOTAL CA: CPT | Performed by: REGISTERED NURSE

## 2021-01-01 PROCEDURE — 96368 THER/DIAG CONCURRENT INF: CPT

## 2021-01-01 PROCEDURE — 86850 RBC ANTIBODY SCREEN: CPT | Performed by: NURSE PRACTITIONER

## 2021-01-01 PROCEDURE — 80162 ASSAY OF DIGOXIN TOTAL: CPT | Performed by: PHYSICIAN ASSISTANT

## 2021-01-01 PROCEDURE — 81001 URINALYSIS AUTO W/SCOPE: CPT

## 2021-01-01 PROCEDURE — 89051 BODY FLUID CELL COUNT: CPT | Performed by: INTERNAL MEDICINE

## 2021-01-01 PROCEDURE — 83550 IRON BINDING TEST: CPT | Performed by: INTERNAL MEDICINE

## 2021-01-01 PROCEDURE — 85014 HEMATOCRIT: CPT | Performed by: NURSE PRACTITIONER

## 2021-01-01 PROCEDURE — 80048 BASIC METABOLIC PNL TOTAL CA: CPT | Performed by: STUDENT IN AN ORGANIZED HEALTH CARE EDUCATION/TRAINING PROGRAM

## 2021-01-01 PROCEDURE — 99238 HOSP IP/OBS DSCHRG MGMT 30/<: CPT | Performed by: PHYSICIAN ASSISTANT

## 2021-01-01 PROCEDURE — 84134 ASSAY OF PREALBUMIN: CPT | Performed by: INTERNAL MEDICINE

## 2021-01-01 PROCEDURE — 82330 ASSAY OF CALCIUM: CPT

## 2021-01-01 PROCEDURE — 4A133J1 MONITORING OF ARTERIAL PULSE, PERIPHERAL, PERCUTANEOUS APPROACH: ICD-10-PCS | Performed by: ANESTHESIOLOGY

## 2021-01-01 PROCEDURE — 85027 COMPLETE CBC AUTOMATED: CPT | Performed by: STUDENT IN AN ORGANIZED HEALTH CARE EDUCATION/TRAINING PROGRAM

## 2021-01-01 PROCEDURE — 81001 URINALYSIS AUTO W/SCOPE: CPT | Performed by: PHYSICIAN ASSISTANT

## 2021-01-01 PROCEDURE — 3066F NEPHROPATHY DOC TX: CPT | Performed by: NURSE PRACTITIONER

## 2021-01-01 PROCEDURE — 83735 ASSAY OF MAGNESIUM: CPT | Performed by: REGISTERED NURSE

## 2021-01-01 PROCEDURE — 82728 ASSAY OF FERRITIN: CPT | Performed by: NURSE PRACTITIONER

## 2021-01-01 PROCEDURE — 87070 CULTURE OTHR SPECIMN AEROBIC: CPT | Performed by: INTERNAL MEDICINE

## 2021-01-01 PROCEDURE — 1111F DSCHRG MED/CURRENT MED MERGE: CPT | Performed by: INTERNAL MEDICINE

## 2021-01-01 PROCEDURE — 99232 SBSQ HOSP IP/OBS MODERATE 35: CPT | Performed by: SURGERY

## 2021-01-01 PROCEDURE — 82550 ASSAY OF CK (CPK): CPT | Performed by: INTERNAL MEDICINE

## 2021-01-01 PROCEDURE — 3066F NEPHROPATHY DOC TX: CPT | Performed by: INTERNAL MEDICINE

## 2021-01-01 PROCEDURE — 85018 HEMOGLOBIN: CPT | Performed by: HOSPITALIST

## 2021-01-01 PROCEDURE — 04V23DZ RESTRICTION OF GASTRIC ARTERY WITH INTRALUMINAL DEVICE, PERCUTANEOUS APPROACH: ICD-10-PCS | Performed by: INTERNAL MEDICINE

## 2021-01-01 PROCEDURE — 99232 SBSQ HOSP IP/OBS MODERATE 35: CPT | Performed by: HOSPITALIST

## 2021-01-01 PROCEDURE — 0W9930Z DRAINAGE OF RIGHT PLEURAL CAVITY WITH DRAINAGE DEVICE, PERCUTANEOUS APPROACH: ICD-10-PCS | Performed by: RADIOLOGY

## 2021-01-01 PROCEDURE — 85014 HEMATOCRIT: CPT

## 2021-01-01 PROCEDURE — 99291 CRITICAL CARE FIRST HOUR: CPT

## 2021-01-01 PROCEDURE — C9290 INJ, BUPIVACAINE LIPOSOME: HCPCS | Performed by: THORACIC SURGERY (CARDIOTHORACIC VASCULAR SURGERY)

## 2021-01-01 PROCEDURE — 83935 ASSAY OF URINE OSMOLALITY: CPT | Performed by: PHYSICIAN ASSISTANT

## 2021-01-01 PROCEDURE — 0WJ93ZZ INSPECTION OF RIGHT PLEURAL CAVITY, PERCUTANEOUS APPROACH: ICD-10-PCS | Performed by: INTERNAL MEDICINE

## 2021-01-01 PROCEDURE — 30233K1 TRANSFUSION OF NONAUTOLOGOUS FROZEN PLASMA INTO PERIPHERAL VEIN, PERCUTANEOUS APPROACH: ICD-10-PCS | Performed by: INTERNAL MEDICINE

## 2021-01-01 PROCEDURE — 83690 ASSAY OF LIPASE: CPT

## 2021-01-01 PROCEDURE — 87040 BLOOD CULTURE FOR BACTERIA: CPT | Performed by: EMERGENCY MEDICINE

## 2021-01-01 PROCEDURE — 99291 CRITICAL CARE FIRST HOUR: CPT | Performed by: SURGERY

## 2021-01-01 PROCEDURE — 71250 CT THORAX DX C-: CPT

## 2021-01-01 PROCEDURE — 03HY32Z INSERTION OF MONITORING DEVICE INTO UPPER ARTERY, PERCUTANEOUS APPROACH: ICD-10-PCS | Performed by: ANESTHESIOLOGY

## 2021-01-01 PROCEDURE — 85014 HEMATOCRIT: CPT | Performed by: EMERGENCY MEDICINE

## 2021-01-01 PROCEDURE — 86850 RBC ANTIBODY SCREEN: CPT | Performed by: INTERNAL MEDICINE

## 2021-01-01 PROCEDURE — 87086 URINE CULTURE/COLONY COUNT: CPT | Performed by: INTERNAL MEDICINE

## 2021-01-01 PROCEDURE — 82104 ALPHA-1-ANTITRYPSIN PHENO: CPT

## 2021-01-01 PROCEDURE — 30233N1 TRANSFUSION OF NONAUTOLOGOUS RED BLOOD CELLS INTO PERIPHERAL VEIN, PERCUTANEOUS APPROACH: ICD-10-PCS | Performed by: INTERNAL MEDICINE

## 2021-01-01 PROCEDURE — 83605 ASSAY OF LACTIC ACID: CPT | Performed by: EMERGENCY MEDICINE

## 2021-01-01 PROCEDURE — 82103 ALPHA-1-ANTITRYPSIN TOTAL: CPT

## 2021-01-01 PROCEDURE — 97112 NEUROMUSCULAR REEDUCATION: CPT

## 2021-01-01 PROCEDURE — NC001 PR NO CHARGE: Performed by: STUDENT IN AN ORGANIZED HEALTH CARE EDUCATION/TRAINING PROGRAM

## 2021-01-01 PROCEDURE — 83615 LACTATE (LD) (LDH) ENZYME: CPT | Performed by: INTERNAL MEDICINE

## 2021-01-01 PROCEDURE — 85018 HEMOGLOBIN: CPT | Performed by: NURSE PRACTITIONER

## 2021-01-01 PROCEDURE — 99291 CRITICAL CARE FIRST HOUR: CPT | Performed by: EMERGENCY MEDICINE

## 2021-01-01 PROCEDURE — 30233N1 TRANSFUSION OF NONAUTOLOGOUS RED BLOOD CELLS INTO PERIPHERAL VEIN, PERCUTANEOUS APPROACH: ICD-10-PCS | Performed by: NURSE PRACTITIONER

## 2021-01-01 PROCEDURE — 84295 ASSAY OF SERUM SODIUM: CPT

## 2021-01-01 PROCEDURE — 99223 1ST HOSP IP/OBS HIGH 75: CPT | Performed by: STUDENT IN AN ORGANIZED HEALTH CARE EDUCATION/TRAINING PROGRAM

## 2021-01-01 PROCEDURE — 89051 BODY FLUID CELL COUNT: CPT | Performed by: PHYSICIAN ASSISTANT

## 2021-01-01 PROCEDURE — 99223 1ST HOSP IP/OBS HIGH 75: CPT | Performed by: PHYSICIAN ASSISTANT

## 2021-01-01 PROCEDURE — 3044F HG A1C LEVEL LT 7.0%: CPT | Performed by: INTERNAL MEDICINE

## 2021-01-01 PROCEDURE — 84156 ASSAY OF PROTEIN URINE: CPT | Performed by: PHYSICIAN ASSISTANT

## 2021-01-01 PROCEDURE — 84300 ASSAY OF URINE SODIUM: CPT | Performed by: INTERNAL MEDICINE

## 2021-01-01 PROCEDURE — 83540 ASSAY OF IRON: CPT

## 2021-01-01 PROCEDURE — 85730 THROMBOPLASTIN TIME PARTIAL: CPT | Performed by: PHYSICIAN ASSISTANT

## 2021-01-01 PROCEDURE — 80053 COMPREHEN METABOLIC PANEL: CPT | Performed by: NURSE PRACTITIONER

## 2021-01-01 PROCEDURE — 82043 UR ALBUMIN QUANTITATIVE: CPT | Performed by: PHYSICIAN ASSISTANT

## 2021-01-01 PROCEDURE — 96367 TX/PROPH/DG ADDL SEQ IV INF: CPT

## 2021-01-01 PROCEDURE — 83970 ASSAY OF PARATHORMONE: CPT | Performed by: INTERNAL MEDICINE

## 2021-01-01 PROCEDURE — 83970 ASSAY OF PARATHORMONE: CPT

## 2021-01-01 PROCEDURE — 87116 MYCOBACTERIA CULTURE: CPT | Performed by: THORACIC SURGERY (CARDIOTHORACIC VASCULAR SURGERY)

## 2021-01-01 PROCEDURE — 75710 ARTERY X-RAYS ARM/LEG: CPT | Performed by: INTERNAL MEDICINE

## 2021-01-01 PROCEDURE — 82570 ASSAY OF URINE CREATININE: CPT | Performed by: INTERNAL MEDICINE

## 2021-01-01 PROCEDURE — 85007 BL SMEAR W/DIFF WBC COUNT: CPT

## 2021-01-01 PROCEDURE — 84540 ASSAY OF URINE/UREA-N: CPT | Performed by: NURSE PRACTITIONER

## 2021-01-01 PROCEDURE — 99285 EMERGENCY DEPT VISIT HI MDM: CPT | Performed by: EMERGENCY MEDICINE

## 2021-01-01 PROCEDURE — 83735 ASSAY OF MAGNESIUM: CPT | Performed by: EMERGENCY MEDICINE

## 2021-01-01 PROCEDURE — 86334 IMMUNOFIX E-PHORESIS SERUM: CPT | Performed by: PATHOLOGY

## 2021-01-01 PROCEDURE — 74177 CT ABD & PELVIS W/CONTRAST: CPT

## 2021-01-01 PROCEDURE — 87205 SMEAR GRAM STAIN: CPT | Performed by: PHYSICIAN ASSISTANT

## 2021-01-01 PROCEDURE — 99221 1ST HOSP IP/OBS SF/LOW 40: CPT | Performed by: THORACIC SURGERY (CARDIOTHORACIC VASCULAR SURGERY)

## 2021-01-01 PROCEDURE — 85610 PROTHROMBIN TIME: CPT | Performed by: STUDENT IN AN ORGANIZED HEALTH CARE EDUCATION/TRAINING PROGRAM

## 2021-01-01 PROCEDURE — 82330 ASSAY OF CALCIUM: CPT | Performed by: INTERNAL MEDICINE

## 2021-01-01 PROCEDURE — 99232 SBSQ HOSP IP/OBS MODERATE 35: CPT | Performed by: FAMILY MEDICINE

## 2021-01-01 PROCEDURE — 96361 HYDRATE IV INFUSION ADD-ON: CPT

## 2021-01-01 PROCEDURE — 99231 SBSQ HOSP IP/OBS SF/LOW 25: CPT | Performed by: INTERNAL MEDICINE

## 2021-01-01 PROCEDURE — 97166 OT EVAL MOD COMPLEX 45 MIN: CPT

## 2021-01-01 PROCEDURE — 3079F DIAST BP 80-89 MM HG: CPT | Performed by: INTERNAL MEDICINE

## 2021-01-01 PROCEDURE — 99222 1ST HOSP IP/OBS MODERATE 55: CPT | Performed by: NURSE PRACTITIONER

## 2021-01-01 PROCEDURE — 86677 HELICOBACTER PYLORI ANTIBODY: CPT

## 2021-01-01 PROCEDURE — 0BNC4ZZ RELEASE RIGHT UPPER LUNG LOBE, PERCUTANEOUS ENDOSCOPIC APPROACH: ICD-10-PCS | Performed by: THORACIC SURGERY (CARDIOTHORACIC VASCULAR SURGERY)

## 2021-01-01 PROCEDURE — 85025 COMPLETE CBC W/AUTO DIFF WBC: CPT | Performed by: NURSE PRACTITIONER

## 2021-01-01 PROCEDURE — 30233K1 TRANSFUSION OF NONAUTOLOGOUS FROZEN PLASMA INTO PERIPHERAL VEIN, PERCUTANEOUS APPROACH: ICD-10-PCS | Performed by: STUDENT IN AN ORGANIZED HEALTH CARE EDUCATION/TRAINING PROGRAM

## 2021-01-01 PROCEDURE — 75774 ARTERY X-RAY EACH VESSEL: CPT | Performed by: INTERNAL MEDICINE

## 2021-01-01 PROCEDURE — 99222 1ST HOSP IP/OBS MODERATE 55: CPT | Performed by: SURGERY

## 2021-01-01 PROCEDURE — 83540 ASSAY OF IRON: CPT | Performed by: INTERNAL MEDICINE

## 2021-01-01 PROCEDURE — 86900 BLOOD TYPING SEROLOGIC ABO: CPT | Performed by: PHYSICIAN ASSISTANT

## 2021-01-01 PROCEDURE — 99233 SBSQ HOSP IP/OBS HIGH 50: CPT | Performed by: SURGERY

## 2021-01-01 PROCEDURE — 82105 ALPHA-FETOPROTEIN SERUM: CPT

## 2021-01-01 PROCEDURE — 85730 THROMBOPLASTIN TIME PARTIAL: CPT | Performed by: SURGERY

## 2021-01-01 PROCEDURE — 82607 VITAMIN B-12: CPT | Performed by: INTERNAL MEDICINE

## 2021-01-01 PROCEDURE — U0005 INFEC AGEN DETEC AMPLI PROBE: HCPCS | Performed by: INTERNAL MEDICINE

## 2021-01-01 PROCEDURE — 84484 ASSAY OF TROPONIN QUANT: CPT | Performed by: PHYSICIAN ASSISTANT

## 2021-01-01 PROCEDURE — NC001 PR NO CHARGE: Performed by: SURGERY

## 2021-01-01 PROCEDURE — 87102 FUNGUS ISOLATION CULTURE: CPT | Performed by: THORACIC SURGERY (CARDIOTHORACIC VASCULAR SURGERY)

## 2021-01-01 PROCEDURE — 87205 SMEAR GRAM STAIN: CPT | Performed by: THORACIC SURGERY (CARDIOTHORACIC VASCULAR SURGERY)

## 2021-01-01 PROCEDURE — 84100 ASSAY OF PHOSPHORUS: CPT | Performed by: EMERGENCY MEDICINE

## 2021-01-01 PROCEDURE — 84484 ASSAY OF TROPONIN QUANT: CPT

## 2021-01-01 PROCEDURE — 83986 ASSAY PH BODY FLUID NOS: CPT | Performed by: PHYSICIAN ASSISTANT

## 2021-01-01 PROCEDURE — 73100 X-RAY EXAM OF WRIST: CPT

## 2021-01-01 PROCEDURE — 82272 OCCULT BLD FECES 1-3 TESTS: CPT | Performed by: NURSE PRACTITIONER

## 2021-01-01 PROCEDURE — 99223 1ST HOSP IP/OBS HIGH 75: CPT | Performed by: FAMILY MEDICINE

## 2021-01-01 PROCEDURE — 86900 BLOOD TYPING SEROLOGIC ABO: CPT | Performed by: NURSE PRACTITIONER

## 2021-01-01 PROCEDURE — 32555 ASPIRATE PLEURA W/ IMAGING: CPT | Performed by: RADIOLOGY

## 2021-01-01 PROCEDURE — 32555 ASPIRATE PLEURA W/ IMAGING: CPT

## 2021-01-01 PROCEDURE — 82805 BLOOD GASES W/O2 SATURATION: CPT | Performed by: EMERGENCY MEDICINE

## 2021-01-01 PROCEDURE — 86850 RBC ANTIBODY SCREEN: CPT

## 2021-01-01 PROCEDURE — P9040 RBC LEUKOREDUCED IRRADIATED: HCPCS

## 2021-01-01 PROCEDURE — 36248 INS CATH ABD/L-EXT ART ADDL: CPT | Performed by: INTERNAL MEDICINE

## 2021-01-01 PROCEDURE — 32652 THORACOSCOPY REM TOTL CORTEX: CPT | Performed by: THORACIC SURGERY (CARDIOTHORACIC VASCULAR SURGERY)

## 2021-01-01 PROCEDURE — 32557 INSERT CATH PLEURA W/ IMAGE: CPT | Performed by: RADIOLOGY

## 2021-01-01 PROCEDURE — 3008F BODY MASS INDEX DOCD: CPT | Performed by: NURSE PRACTITIONER

## 2021-01-01 PROCEDURE — 76937 US GUIDE VASCULAR ACCESS: CPT | Performed by: INTERNAL MEDICINE

## 2021-01-01 PROCEDURE — 82330 ASSAY OF CALCIUM: CPT | Performed by: REGISTERED NURSE

## 2021-01-01 PROCEDURE — 86901 BLOOD TYPING SEROLOGIC RH(D): CPT | Performed by: NURSE PRACTITIONER

## 2021-01-01 PROCEDURE — 32551 INSERTION OF CHEST TUBE: CPT

## 2021-01-01 PROCEDURE — 82272 OCCULT BLD FECES 1-3 TESTS: CPT | Performed by: STUDENT IN AN ORGANIZED HEALTH CARE EDUCATION/TRAINING PROGRAM

## 2021-01-01 PROCEDURE — 86901 BLOOD TYPING SEROLOGIC RH(D): CPT | Performed by: INTERNAL MEDICINE

## 2021-01-01 PROCEDURE — 83883 ASSAY NEPHELOMETRY NOT SPEC: CPT

## 2021-01-01 PROCEDURE — 87081 CULTURE SCREEN ONLY: CPT | Performed by: INTERNAL MEDICINE

## 2021-01-01 PROCEDURE — 83540 ASSAY OF IRON: CPT | Performed by: NURSE PRACTITIONER

## 2021-01-01 PROCEDURE — 80162 ASSAY OF DIGOXIN TOTAL: CPT | Performed by: INTERNAL MEDICINE

## 2021-01-01 PROCEDURE — 85027 COMPLETE CBC AUTOMATED: CPT

## 2021-01-01 PROCEDURE — 86900 BLOOD TYPING SEROLOGIC ABO: CPT | Performed by: INTERNAL MEDICINE

## 2021-01-01 PROCEDURE — 83880 ASSAY OF NATRIURETIC PEPTIDE: CPT | Performed by: PHYSICIAN ASSISTANT

## 2021-01-01 PROCEDURE — 93976 VASCULAR STUDY: CPT

## 2021-01-01 PROCEDURE — U0003 INFECTIOUS AGENT DETECTION BY NUCLEIC ACID (DNA OR RNA); SEVERE ACUTE RESPIRATORY SYNDROME CORONAVIRUS 2 (SARS-COV-2) (CORONAVIRUS DISEASE [COVID-19]), AMPLIFIED PROBE TECHNIQUE, MAKING USE OF HIGH THROUGHPUT TECHNOLOGIES AS DESCRIBED BY CMS-2020-01-R: HCPCS | Performed by: INTERNAL MEDICINE

## 2021-01-01 PROCEDURE — 87186 SC STD MICRODIL/AGAR DIL: CPT | Performed by: INTERNAL MEDICINE

## 2021-01-01 PROCEDURE — NC001 PR NO CHARGE: Performed by: THORACIC SURGERY (CARDIOTHORACIC VASCULAR SURGERY)

## 2021-01-01 PROCEDURE — C1760 CLOSURE DEV, VASC: HCPCS

## 2021-01-01 PROCEDURE — 37242 VASC EMBOLIZE/OCCLUDE ARTERY: CPT | Performed by: INTERNAL MEDICINE

## 2021-01-01 PROCEDURE — 97164 PT RE-EVAL EST PLAN CARE: CPT

## 2021-01-01 PROCEDURE — 76981 USE PARENCHYMA: CPT

## 2021-01-01 PROCEDURE — 76705 ECHO EXAM OF ABDOMEN: CPT

## 2021-01-01 PROCEDURE — 82945 GLUCOSE OTHER FLUID: CPT | Performed by: INTERNAL MEDICINE

## 2021-01-01 RX ORDER — POTASSIUM CHLORIDE 20 MEQ/1
20 TABLET, EXTENDED RELEASE ORAL
Status: DISCONTINUED | OUTPATIENT
Start: 2021-01-01 | End: 2021-01-01

## 2021-01-01 RX ORDER — HYDROMORPHONE HCL IN WATER/PF 6 MG/30 ML
0.2 PATIENT CONTROLLED ANALGESIA SYRINGE INTRAVENOUS EVERY 6 HOURS PRN
Status: DISCONTINUED | OUTPATIENT
Start: 2021-01-01 | End: 2021-01-01

## 2021-01-01 RX ORDER — POLYVINYL ALCOHOL 14 MG/ML
2 SOLUTION/ DROPS OPHTHALMIC
Status: DISCONTINUED | OUTPATIENT
Start: 2021-01-01 | End: 2021-01-01 | Stop reason: HOSPADM

## 2021-01-01 RX ORDER — ONDANSETRON 2 MG/ML
4 INJECTION INTRAMUSCULAR; INTRAVENOUS ONCE AS NEEDED
Status: DISCONTINUED | OUTPATIENT
Start: 2021-01-01 | End: 2021-01-01 | Stop reason: HOSPADM

## 2021-01-01 RX ORDER — OXYCODONE HYDROCHLORIDE 5 MG/1
5-10 TABLET ORAL EVERY 4 HOURS PRN
Qty: 36 TABLET | Refills: 0 | Status: SHIPPED | OUTPATIENT
Start: 2021-01-01 | End: 2021-01-01

## 2021-01-01 RX ORDER — METOPROLOL SUCCINATE 25 MG/1
25 TABLET, EXTENDED RELEASE ORAL DAILY
Status: DISCONTINUED | OUTPATIENT
Start: 2021-01-01 | End: 2021-01-01

## 2021-01-01 RX ORDER — BUMETANIDE 0.25 MG/ML
2 INJECTION, SOLUTION INTRAMUSCULAR; INTRAVENOUS DAILY
Status: DISCONTINUED | OUTPATIENT
Start: 2021-01-01 | End: 2021-01-01

## 2021-01-01 RX ORDER — SODIUM CHLORIDE 9 MG/ML
75 INJECTION, SOLUTION INTRAVENOUS CONTINUOUS
Status: DISCONTINUED | OUTPATIENT
Start: 2021-01-01 | End: 2021-01-01

## 2021-01-01 RX ORDER — ERGOCALCIFEROL (VITAMIN D2) 1250 MCG
50000 CAPSULE ORAL WEEKLY
Qty: 12 CAPSULE | Refills: 4 | Status: SHIPPED | OUTPATIENT
Start: 2021-01-01 | End: 2021-01-01

## 2021-01-01 RX ORDER — ONDANSETRON 2 MG/ML
4 INJECTION INTRAMUSCULAR; INTRAVENOUS EVERY 6 HOURS PRN
Status: DISCONTINUED | OUTPATIENT
Start: 2021-01-01 | End: 2021-01-01 | Stop reason: HOSPADM

## 2021-01-01 RX ORDER — MUSCLE RUB CREAM 100; 150 MG/G; MG/G
CREAM TOPICAL 4 TIMES DAILY PRN
Status: DISCONTINUED | OUTPATIENT
Start: 2021-01-01 | End: 2021-01-01 | Stop reason: HOSPADM

## 2021-01-01 RX ORDER — FUROSEMIDE 80 MG
TABLET ORAL
Status: ON HOLD | COMMUNITY
Start: 2021-01-01 | End: 2022-01-01

## 2021-01-01 RX ORDER — ALBUMIN, HUMAN INJ 5% 5 %
25 SOLUTION INTRAVENOUS EVERY 12 HOURS
Status: COMPLETED | OUTPATIENT
Start: 2021-01-01 | End: 2021-01-01

## 2021-01-01 RX ORDER — CEFEPIME HYDROCHLORIDE 1 G/50ML
1000 INJECTION, SOLUTION INTRAVENOUS ONCE
Status: COMPLETED | OUTPATIENT
Start: 2021-01-01 | End: 2021-01-01

## 2021-01-01 RX ORDER — POTASSIUM CHLORIDE 20 MEQ/1
20 TABLET, EXTENDED RELEASE ORAL 2 TIMES DAILY
Status: DISCONTINUED | OUTPATIENT
Start: 2021-01-01 | End: 2021-01-01

## 2021-01-01 RX ORDER — ALBUMIN, HUMAN INJ 5% 5 %
12.5 SOLUTION INTRAVENOUS ONCE
Status: COMPLETED | OUTPATIENT
Start: 2021-01-01 | End: 2021-01-01

## 2021-01-01 RX ORDER — FUROSEMIDE 10 MG/ML
40 INJECTION INTRAMUSCULAR; INTRAVENOUS ONCE
Status: DISCONTINUED | OUTPATIENT
Start: 2021-01-01 | End: 2021-01-01 | Stop reason: HOSPADM

## 2021-01-01 RX ORDER — BUMETANIDE 1 MG/1
1 TABLET ORAL 2 TIMES DAILY
Qty: 30 TABLET | Refills: 0 | Status: SHIPPED | OUTPATIENT
Start: 2021-01-01 | End: 2021-01-01 | Stop reason: SDUPTHER

## 2021-01-01 RX ORDER — CARVEDILOL 6.25 MG/1
6.25 TABLET ORAL 2 TIMES DAILY WITH MEALS
Status: DISCONTINUED | OUTPATIENT
Start: 2021-01-01 | End: 2021-01-01

## 2021-01-01 RX ORDER — POTASSIUM CHLORIDE 14.9 MG/ML
20 INJECTION INTRAVENOUS ONCE
Status: COMPLETED | OUTPATIENT
Start: 2021-01-01 | End: 2021-01-01

## 2021-01-01 RX ORDER — MIDODRINE HYDROCHLORIDE 5 MG/1
5 TABLET ORAL
Status: DISCONTINUED | OUTPATIENT
Start: 2021-01-01 | End: 2021-01-01

## 2021-01-01 RX ORDER — MIDODRINE HYDROCHLORIDE 5 MG/1
5 TABLET ORAL
Status: DISCONTINUED | OUTPATIENT
Start: 2021-01-01 | End: 2021-01-01 | Stop reason: HOSPADM

## 2021-01-01 RX ORDER — FUROSEMIDE 10 MG/ML
40 INJECTION INTRAMUSCULAR; INTRAVENOUS ONCE
Status: COMPLETED | OUTPATIENT
Start: 2021-01-01 | End: 2021-01-01

## 2021-01-01 RX ORDER — FUROSEMIDE 40 MG/1
80 TABLET ORAL
Status: CANCELLED | OUTPATIENT
Start: 2021-01-01

## 2021-01-01 RX ORDER — POTASSIUM CHLORIDE 20 MEQ/1
40 TABLET, EXTENDED RELEASE ORAL
Status: COMPLETED | OUTPATIENT
Start: 2021-01-01 | End: 2021-01-01

## 2021-01-01 RX ORDER — POTASSIUM CHLORIDE 20 MEQ/1
20 TABLET, EXTENDED RELEASE ORAL ONCE
Status: COMPLETED | OUTPATIENT
Start: 2021-01-01 | End: 2021-01-01

## 2021-01-01 RX ORDER — FERROUS SULFATE TAB EC 324 MG (65 MG FE EQUIVALENT) 324 (65 FE) MG
324 TABLET DELAYED RESPONSE ORAL
Qty: 60 TABLET | Refills: 0 | Status: ON HOLD | OUTPATIENT
Start: 2021-01-01 | End: 2021-01-01

## 2021-01-01 RX ORDER — KETAMINE HCL IN NACL, ISO-OSM 100MG/10ML
SYRINGE (ML) INJECTION AS NEEDED
Status: DISCONTINUED | OUTPATIENT
Start: 2021-01-01 | End: 2021-01-01

## 2021-01-01 RX ORDER — FUROSEMIDE 10 MG/ML
60 INJECTION INTRAMUSCULAR; INTRAVENOUS
Status: DISCONTINUED | OUTPATIENT
Start: 2021-01-01 | End: 2021-01-01

## 2021-01-01 RX ORDER — FENTANYL CITRATE 50 UG/ML
INJECTION, SOLUTION INTRAMUSCULAR; INTRAVENOUS AS NEEDED
Status: DISCONTINUED | OUTPATIENT
Start: 2021-01-01 | End: 2021-01-01

## 2021-01-01 RX ORDER — CALCIUM GLUCONATE 20 MG/ML
2 INJECTION, SOLUTION INTRAVENOUS ONCE
Status: COMPLETED | OUTPATIENT
Start: 2021-01-01 | End: 2021-01-01

## 2021-01-01 RX ORDER — FUROSEMIDE 10 MG/ML
80 INJECTION INTRAMUSCULAR; INTRAVENOUS
Status: DISCONTINUED | OUTPATIENT
Start: 2021-01-01 | End: 2021-01-01

## 2021-01-01 RX ORDER — METOLAZONE 5 MG/1
5 TABLET ORAL 2 TIMES WEEKLY
Status: DISCONTINUED | OUTPATIENT
Start: 2021-01-01 | End: 2021-01-01

## 2021-01-01 RX ORDER — GABAPENTIN 300 MG/1
300 CAPSULE ORAL 3 TIMES DAILY
Qty: 270 CAPSULE | Refills: 0 | Status: SHIPPED | OUTPATIENT
Start: 2021-01-01 | End: 2021-01-01

## 2021-01-01 RX ORDER — POTASSIUM CHLORIDE 20MEQ/15ML
20 LIQUID (ML) ORAL ONCE
Status: COMPLETED | OUTPATIENT
Start: 2021-01-01 | End: 2021-01-01

## 2021-01-01 RX ORDER — FENTANYL CITRATE/PF 50 MCG/ML
50 SYRINGE (ML) INJECTION
Status: DISCONTINUED | OUTPATIENT
Start: 2021-01-01 | End: 2021-01-01 | Stop reason: HOSPADM

## 2021-01-01 RX ORDER — SODIUM CHLORIDE 9 MG/ML
20 INJECTION, SOLUTION INTRAVENOUS CONTINUOUS
Status: DISCONTINUED | OUTPATIENT
Start: 2021-01-01 | End: 2022-01-01 | Stop reason: HOSPADM

## 2021-01-01 RX ORDER — CARVEDILOL 12.5 MG/1
12.5 TABLET ORAL 2 TIMES DAILY WITH MEALS
Status: DISCONTINUED | OUTPATIENT
Start: 2021-01-01 | End: 2021-01-01

## 2021-01-01 RX ORDER — HYDROMORPHONE HCL/PF 1 MG/ML
0.25 SYRINGE (ML) INJECTION
Status: DISCONTINUED | OUTPATIENT
Start: 2021-01-01 | End: 2021-01-01 | Stop reason: HOSPADM

## 2021-01-01 RX ORDER — HYDROMORPHONE HCL IN WATER/PF 6 MG/30 ML
0.2 PATIENT CONTROLLED ANALGESIA SYRINGE INTRAVENOUS EVERY 6 HOURS PRN
Status: CANCELLED | OUTPATIENT
Start: 2021-01-01

## 2021-01-01 RX ORDER — OMEPRAZOLE 20 MG/1
20 CAPSULE, DELAYED RELEASE ORAL 2 TIMES DAILY
Start: 2021-01-01 | End: 2021-01-01

## 2021-01-01 RX ORDER — WARFARIN SODIUM 5 MG/1
5 TABLET ORAL
Status: DISCONTINUED | OUTPATIENT
Start: 2021-01-01 | End: 2021-01-01

## 2021-01-01 RX ORDER — MIDODRINE HYDROCHLORIDE 10 MG/1
10 TABLET ORAL 2 TIMES DAILY
Qty: 90 TABLET | Refills: 0 | Status: ON HOLD
Start: 2021-01-01 | End: 2021-01-01

## 2021-01-01 RX ORDER — DOCUSATE SODIUM 100 MG/1
100 CAPSULE, LIQUID FILLED ORAL 2 TIMES DAILY PRN
Refills: 0 | Status: ON HOLD
Start: 2021-01-01 | End: 2021-01-01

## 2021-01-01 RX ORDER — CARVEDILOL 6.25 MG/1
6.25 TABLET ORAL 2 TIMES DAILY WITH MEALS
Status: DISCONTINUED | OUTPATIENT
Start: 2021-01-01 | End: 2021-01-01 | Stop reason: HOSPADM

## 2021-01-01 RX ORDER — SODIUM CHLORIDE, SODIUM LACTATE, POTASSIUM CHLORIDE, CALCIUM CHLORIDE 600; 310; 30; 20 MG/100ML; MG/100ML; MG/100ML; MG/100ML
75 INJECTION, SOLUTION INTRAVENOUS CONTINUOUS
Status: DISCONTINUED | OUTPATIENT
Start: 2021-01-01 | End: 2021-01-01

## 2021-01-01 RX ORDER — ALBUMIN (HUMAN) 12.5 G/50ML
25 SOLUTION INTRAVENOUS EVERY 8 HOURS
Status: DISCONTINUED | OUTPATIENT
Start: 2021-01-01 | End: 2021-01-01

## 2021-01-01 RX ORDER — MAGNESIUM SULFATE HEPTAHYDRATE 40 MG/ML
2 INJECTION, SOLUTION INTRAVENOUS ONCE
Status: COMPLETED | OUTPATIENT
Start: 2021-01-01 | End: 2021-01-01

## 2021-01-01 RX ORDER — ALLOPURINOL 100 MG/1
TABLET ORAL
Qty: 180 TABLET | Refills: 1 | Status: SHIPPED | OUTPATIENT
Start: 2021-01-01 | End: 2021-01-01

## 2021-01-01 RX ORDER — FUROSEMIDE 40 MG/1
40 TABLET ORAL
Status: DISCONTINUED | OUTPATIENT
Start: 2021-01-01 | End: 2021-01-01

## 2021-01-01 RX ORDER — POTASSIUM CHLORIDE 750 MG/1
TABLET, EXTENDED RELEASE ORAL
Qty: 360 TABLET | Refills: 2
Start: 2021-01-01 | End: 2021-01-01

## 2021-01-01 RX ORDER — MIDODRINE HYDROCHLORIDE 5 MG/1
15 TABLET ORAL EVERY 8 HOURS
Status: DISCONTINUED | OUTPATIENT
Start: 2021-01-01 | End: 2021-01-01 | Stop reason: HOSPADM

## 2021-01-01 RX ORDER — WARFARIN SODIUM 2.5 MG/1
2.5 TABLET ORAL
Status: DISCONTINUED | OUTPATIENT
Start: 2021-01-01 | End: 2021-01-01

## 2021-01-01 RX ORDER — ONDANSETRON 2 MG/ML
INJECTION INTRAMUSCULAR; INTRAVENOUS AS NEEDED
Status: DISCONTINUED | OUTPATIENT
Start: 2021-01-01 | End: 2021-01-01

## 2021-01-01 RX ORDER — AMOXICILLIN 250 MG
2 CAPSULE ORAL 2 TIMES DAILY
Refills: 0
Start: 2021-01-01

## 2021-01-01 RX ORDER — ACETAMINOPHEN 325 MG/1
650 TABLET ORAL EVERY 4 HOURS PRN
Status: DISCONTINUED | OUTPATIENT
Start: 2021-01-01 | End: 2021-01-01

## 2021-01-01 RX ORDER — POLYETHYLENE GLYCOL 3350 17 G/17G
17 POWDER, FOR SOLUTION ORAL DAILY PRN
Status: DISCONTINUED | OUTPATIENT
Start: 2021-01-01 | End: 2021-01-01

## 2021-01-01 RX ORDER — DOCUSATE SODIUM 100 MG/1
100 CAPSULE, LIQUID FILLED ORAL 2 TIMES DAILY
Status: DISCONTINUED | OUTPATIENT
Start: 2021-01-01 | End: 2021-01-01 | Stop reason: HOSPADM

## 2021-01-01 RX ORDER — ERGOCALCIFEROL 1.25 MG/1
50000 CAPSULE ORAL WEEKLY
Status: DISCONTINUED | OUTPATIENT
Start: 2021-01-01 | End: 2021-01-01 | Stop reason: HOSPADM

## 2021-01-01 RX ORDER — OXYCODONE HYDROCHLORIDE AND ACETAMINOPHEN 5; 325 MG/1; MG/1
1 TABLET ORAL EVERY 6 HOURS PRN
Status: DISCONTINUED | OUTPATIENT
Start: 2021-01-01 | End: 2021-01-01

## 2021-01-01 RX ORDER — ACETAMINOPHEN 325 MG/1
650 TABLET ORAL EVERY 6 HOURS PRN
Status: DISCONTINUED | OUTPATIENT
Start: 2021-01-01 | End: 2021-01-01

## 2021-01-01 RX ORDER — TRAMADOL HYDROCHLORIDE 50 MG/1
50 TABLET ORAL EVERY 8 HOURS PRN
Qty: 42 TABLET | Refills: 1 | Status: SHIPPED | OUTPATIENT
Start: 2021-01-01 | End: 2021-01-01

## 2021-01-01 RX ORDER — FUROSEMIDE 80 MG
80 TABLET ORAL
Status: DISCONTINUED | OUTPATIENT
Start: 2021-01-01 | End: 2021-01-01 | Stop reason: HOSPADM

## 2021-01-01 RX ORDER — POTASSIUM CHLORIDE 20 MEQ/1
40 TABLET, EXTENDED RELEASE ORAL ONCE
Status: COMPLETED | OUTPATIENT
Start: 2021-01-01 | End: 2021-01-01

## 2021-01-01 RX ORDER — NEOSTIGMINE METHYLSULFATE 1 MG/ML
INJECTION INTRAVENOUS AS NEEDED
Status: DISCONTINUED | OUTPATIENT
Start: 2021-01-01 | End: 2021-01-01

## 2021-01-01 RX ORDER — FUROSEMIDE 80 MG
80 TABLET ORAL 2 TIMES DAILY
Qty: 90 TABLET | Refills: 3 | Status: SHIPPED | OUTPATIENT
Start: 2021-01-01 | End: 2021-01-01 | Stop reason: HOSPADM

## 2021-01-01 RX ORDER — OXYCODONE HYDROCHLORIDE 5 MG/1
2.5 TABLET ORAL EVERY 4 HOURS PRN
Status: DISCONTINUED | OUTPATIENT
Start: 2021-01-01 | End: 2021-01-01 | Stop reason: HOSPADM

## 2021-01-01 RX ORDER — CEFEPIME HYDROCHLORIDE 2 G/50ML
2000 INJECTION, SOLUTION INTRAVENOUS EVERY 12 HOURS
Status: DISCONTINUED | OUTPATIENT
Start: 2021-01-01 | End: 2021-01-01

## 2021-01-01 RX ORDER — LIDOCAINE HYDROCHLORIDE 10 MG/ML
INJECTION, SOLUTION EPIDURAL; INFILTRATION; INTRACAUDAL; PERINEURAL AS NEEDED
Status: DISCONTINUED | OUTPATIENT
Start: 2021-01-01 | End: 2021-01-01

## 2021-01-01 RX ORDER — SODIUM CHLORIDE 9 MG/ML
INJECTION INTRAVENOUS AS NEEDED
Status: DISCONTINUED | OUTPATIENT
Start: 2021-01-01 | End: 2021-01-01 | Stop reason: HOSPADM

## 2021-01-01 RX ORDER — FUROSEMIDE 80 MG
80 TABLET ORAL 2 TIMES DAILY
Qty: 60 TABLET | Refills: 5 | Status: ON HOLD | OUTPATIENT
Start: 2021-01-01 | End: 2021-01-01 | Stop reason: SDUPTHER

## 2021-01-01 RX ORDER — POLYETHYLENE GLYCOL 3350 17 G/17G
17 POWDER, FOR SOLUTION ORAL 2 TIMES DAILY
Status: DISCONTINUED | OUTPATIENT
Start: 2021-01-01 | End: 2021-01-01 | Stop reason: HOSPADM

## 2021-01-01 RX ORDER — LISINOPRIL 5 MG/1
5 TABLET ORAL DAILY
Qty: 90 TABLET | Refills: 1 | Status: SHIPPED | OUTPATIENT
Start: 2021-01-01 | End: 2021-01-01 | Stop reason: HOSPADM

## 2021-01-01 RX ORDER — POTASSIUM CHLORIDE 20 MEQ/1
20 TABLET, EXTENDED RELEASE ORAL EVERY 4 HOURS
Status: COMPLETED | OUTPATIENT
Start: 2021-01-01 | End: 2021-01-01

## 2021-01-01 RX ORDER — PREDNISONE 20 MG/1
40 TABLET ORAL DAILY
Status: DISCONTINUED | OUTPATIENT
Start: 2021-01-01 | End: 2021-01-01

## 2021-01-01 RX ORDER — WARFARIN SODIUM 2.5 MG/1
2.5 TABLET ORAL EVERY OTHER DAY
Status: DISCONTINUED | OUTPATIENT
Start: 2021-01-01 | End: 2021-01-01

## 2021-01-01 RX ORDER — AMOXICILLIN 250 MG
1 CAPSULE ORAL 2 TIMES DAILY
Status: DISCONTINUED | OUTPATIENT
Start: 2021-01-01 | End: 2021-01-01 | Stop reason: HOSPADM

## 2021-01-01 RX ORDER — POLYETHYLENE GLYCOL 3350 17 G/17G
17 POWDER, FOR SOLUTION ORAL DAILY PRN
Status: DISCONTINUED | OUTPATIENT
Start: 2021-01-01 | End: 2021-01-01 | Stop reason: HOSPADM

## 2021-01-01 RX ORDER — DOPAMINE HYDROCHLORIDE 160 MG/100ML
1-20 INJECTION, SOLUTION INTRAVENOUS
Status: DISCONTINUED | OUTPATIENT
Start: 2021-01-01 | End: 2021-01-01

## 2021-01-01 RX ORDER — NICOTINE 21 MG/24HR
1 PATCH, TRANSDERMAL 24 HOURS TRANSDERMAL DAILY
Qty: 28 PATCH | Refills: 0 | Status: SHIPPED | OUTPATIENT
Start: 2021-01-01 | End: 2021-01-01

## 2021-01-01 RX ORDER — AMOXICILLIN 250 MG
2 CAPSULE ORAL 2 TIMES DAILY
Status: DISCONTINUED | OUTPATIENT
Start: 2021-01-01 | End: 2021-01-01 | Stop reason: HOSPADM

## 2021-01-01 RX ORDER — FUROSEMIDE 10 MG/ML
60 INJECTION INTRAMUSCULAR; INTRAVENOUS ONCE
Status: DISCONTINUED | OUTPATIENT
Start: 2021-01-01 | End: 2021-01-01

## 2021-01-01 RX ORDER — NICOTINE 21 MG/24HR
1 PATCH, TRANSDERMAL 24 HOURS TRANSDERMAL DAILY
Status: CANCELLED | OUTPATIENT
Start: 2021-01-01

## 2021-01-01 RX ORDER — POLYETHYLENE GLYCOL 3350 17 G/17G
17 POWDER, FOR SOLUTION ORAL DAILY PRN
Refills: 0
Start: 2021-01-01

## 2021-01-01 RX ORDER — ALBUMIN (HUMAN) 12.5 G/50ML
25 SOLUTION INTRAVENOUS ONCE
Status: COMPLETED | OUTPATIENT
Start: 2021-01-01 | End: 2021-01-01

## 2021-01-01 RX ORDER — BUMETANIDE 1 MG/1
1 TABLET ORAL 2 TIMES DAILY
Status: DISCONTINUED | OUTPATIENT
Start: 2021-01-01 | End: 2021-01-01 | Stop reason: HOSPADM

## 2021-01-01 RX ORDER — ERGOCALCIFEROL 1.25 MG/1
50000 CAPSULE ORAL WEEKLY
Status: CANCELLED | OUTPATIENT
Start: 2021-01-01

## 2021-01-01 RX ORDER — BUMETANIDE 1 MG/1
1 TABLET ORAL
Status: DISCONTINUED | OUTPATIENT
Start: 2021-01-01 | End: 2021-01-01

## 2021-01-01 RX ORDER — LIDOCAINE HYDROCHLORIDE 10 MG/ML
INJECTION, SOLUTION EPIDURAL; INFILTRATION; INTRACAUDAL; PERINEURAL CODE/TRAUMA/SEDATION MEDICATION
Status: COMPLETED | OUTPATIENT
Start: 2021-01-01 | End: 2021-01-01

## 2021-01-01 RX ORDER — CALCIUM CARBONATE 200(500)MG
500 TABLET,CHEWABLE ORAL 2 TIMES DAILY PRN
Refills: 0
Start: 2021-01-01

## 2021-01-01 RX ORDER — WARFARIN SODIUM 10 MG/1
10 TABLET ORAL
Status: DISCONTINUED | OUTPATIENT
Start: 2021-01-01 | End: 2021-01-01 | Stop reason: HOSPADM

## 2021-01-01 RX ORDER — POTASSIUM CHLORIDE 20 MEQ/1
40 TABLET, EXTENDED RELEASE ORAL 2 TIMES DAILY WITH MEALS
Status: COMPLETED | OUTPATIENT
Start: 2021-01-01 | End: 2021-01-01

## 2021-01-01 RX ORDER — METOLAZONE 5 MG/1
TABLET ORAL
COMMUNITY
Start: 2021-01-01 | End: 2021-01-01 | Stop reason: HOSPADM

## 2021-01-01 RX ORDER — ACETAMINOPHEN 325 MG/1
975 TABLET ORAL EVERY 8 HOURS SCHEDULED
Status: DISCONTINUED | OUTPATIENT
Start: 2021-01-01 | End: 2021-01-01 | Stop reason: HOSPADM

## 2021-01-01 RX ORDER — ALLOPURINOL 100 MG/1
100 TABLET ORAL 2 TIMES DAILY
Qty: 30 TABLET | Refills: 0 | Status: SHIPPED | OUTPATIENT
Start: 2021-01-01 | End: 2021-01-01 | Stop reason: SDUPTHER

## 2021-01-01 RX ORDER — POTASSIUM CHLORIDE 20 MEQ/1
20 TABLET, EXTENDED RELEASE ORAL 2 TIMES DAILY
Qty: 60 TABLET | Refills: 5 | OUTPATIENT
Start: 2021-01-01 | End: 2021-01-01 | Stop reason: HOSPADM

## 2021-01-01 RX ORDER — HYDROMORPHONE HCL IN WATER/PF 6 MG/30 ML
0.2 PATIENT CONTROLLED ANALGESIA SYRINGE INTRAVENOUS EVERY 4 HOURS PRN
Status: DISCONTINUED | OUTPATIENT
Start: 2021-01-01 | End: 2021-01-01 | Stop reason: HOSPADM

## 2021-01-01 RX ORDER — GABAPENTIN 300 MG/1
300 CAPSULE ORAL 3 TIMES DAILY
Status: DISCONTINUED | OUTPATIENT
Start: 2021-01-01 | End: 2021-01-01 | Stop reason: HOSPADM

## 2021-01-01 RX ORDER — CARVEDILOL 6.25 MG/1
6.25 TABLET ORAL 2 TIMES DAILY WITH MEALS
Status: CANCELLED | OUTPATIENT
Start: 2021-01-01

## 2021-01-01 RX ORDER — BUMETANIDE 0.25 MG/ML
1 INJECTION, SOLUTION INTRAMUSCULAR; INTRAVENOUS ONCE
Status: COMPLETED | OUTPATIENT
Start: 2021-01-01 | End: 2021-01-01

## 2021-01-01 RX ORDER — PROPOFOL 10 MG/ML
INJECTION, EMULSION INTRAVENOUS AS NEEDED
Status: DISCONTINUED | OUTPATIENT
Start: 2021-01-01 | End: 2021-01-01

## 2021-01-01 RX ORDER — FERROUS SULFATE 325(65) MG
325 TABLET ORAL
Status: DISCONTINUED | OUTPATIENT
Start: 2021-01-01 | End: 2021-01-01 | Stop reason: HOSPADM

## 2021-01-01 RX ORDER — SODIUM CHLORIDE 9 MG/ML
20 INJECTION, SOLUTION INTRAVENOUS ONCE
Status: CANCELLED | OUTPATIENT
Start: 2021-01-01

## 2021-01-01 RX ORDER — ROCURONIUM BROMIDE 10 MG/ML
INJECTION, SOLUTION INTRAVENOUS AS NEEDED
Status: DISCONTINUED | OUTPATIENT
Start: 2021-01-01 | End: 2021-01-01

## 2021-01-01 RX ORDER — HEPARIN SODIUM 5000 [USP'U]/ML
5000 INJECTION, SOLUTION INTRAVENOUS; SUBCUTANEOUS EVERY 8 HOURS SCHEDULED
Status: DISCONTINUED | OUTPATIENT
Start: 2021-01-01 | End: 2021-01-01

## 2021-01-01 RX ORDER — COLCHICINE 0.6 MG/1
0.6 TABLET ORAL DAILY
Status: DISCONTINUED | OUTPATIENT
Start: 2021-01-01 | End: 2021-01-01 | Stop reason: HOSPADM

## 2021-01-01 RX ORDER — SODIUM CHLORIDE 9 MG/ML
INJECTION, SOLUTION INTRAVENOUS CONTINUOUS PRN
Status: DISCONTINUED | OUTPATIENT
Start: 2021-01-01 | End: 2021-01-01

## 2021-01-01 RX ORDER — WARFARIN SODIUM 5 MG/1
5 TABLET ORAL
Status: DISCONTINUED | OUTPATIENT
Start: 2021-01-01 | End: 2021-01-01 | Stop reason: HOSPADM

## 2021-01-01 RX ORDER — METOLAZONE 5 MG/1
TABLET ORAL
Qty: 45 TABLET | Refills: 3
Start: 2021-01-01 | End: 2021-01-01 | Stop reason: SDUPTHER

## 2021-01-01 RX ORDER — SODIUM POLYSTYRENE SULFONATE 4.1 MEQ/G
15 POWDER, FOR SUSPENSION ORAL; RECTAL ONCE
Status: COMPLETED | OUTPATIENT
Start: 2021-01-01 | End: 2021-01-01

## 2021-01-01 RX ORDER — OXYCODONE HYDROCHLORIDE 5 MG/1
5 TABLET ORAL EVERY 6 HOURS PRN
Qty: 20 TABLET | Refills: 0 | Status: SHIPPED | OUTPATIENT
Start: 2021-01-01 | End: 2021-01-01

## 2021-01-01 RX ORDER — ACETAMINOPHEN 325 MG/1
975 TABLET ORAL EVERY 8 HOURS SCHEDULED
Refills: 0
Start: 2021-01-01

## 2021-01-01 RX ORDER — WARFARIN SODIUM 5 MG/1
TABLET ORAL
Qty: 135 TABLET | Refills: 0 | OUTPATIENT
Start: 2021-01-01 | End: 2021-01-01 | Stop reason: HOSPADM

## 2021-01-01 RX ORDER — WARFARIN SODIUM 5 MG/1
5 TABLET ORAL 3 TIMES WEEKLY
Status: DISCONTINUED | OUTPATIENT
Start: 2021-01-01 | End: 2021-01-01

## 2021-01-01 RX ORDER — NICOTINE 21 MG/24HR
1 PATCH, TRANSDERMAL 24 HOURS TRANSDERMAL DAILY
Status: DISCONTINUED | OUTPATIENT
Start: 2021-01-01 | End: 2021-01-01 | Stop reason: HOSPADM

## 2021-01-01 RX ORDER — POTASSIUM CHLORIDE 20 MEQ/1
20 TABLET, EXTENDED RELEASE ORAL 2 TIMES DAILY WITH MEALS
Status: DISCONTINUED | OUTPATIENT
Start: 2021-01-01 | End: 2021-01-01

## 2021-01-01 RX ORDER — SODIUM CHLORIDE, SODIUM GLUCONATE, SODIUM ACETATE, POTASSIUM CHLORIDE, MAGNESIUM CHLORIDE, SODIUM PHOSPHATE, DIBASIC, AND POTASSIUM PHOSPHATE .53; .5; .37; .037; .03; .012; .00082 G/100ML; G/100ML; G/100ML; G/100ML; G/100ML; G/100ML; G/100ML
75 INJECTION, SOLUTION INTRAVENOUS CONTINUOUS
Status: DISCONTINUED | OUTPATIENT
Start: 2021-01-01 | End: 2021-01-01

## 2021-01-01 RX ORDER — FUROSEMIDE 20 MG/1
20 TABLET ORAL
Status: DISCONTINUED | OUTPATIENT
Start: 2021-01-01 | End: 2021-01-01

## 2021-01-01 RX ORDER — FUROSEMIDE 10 MG/ML
20 SYRINGE (ML) INJECTION CONTINUOUS
Status: DISCONTINUED | OUTPATIENT
Start: 2021-01-01 | End: 2021-01-01

## 2021-01-01 RX ORDER — COLCHICINE 0.6 MG/1
0.6 TABLET ORAL DAILY
Qty: 30 TABLET | Refills: 0 | Status: SHIPPED | OUTPATIENT
Start: 2021-01-01 | End: 2021-01-01

## 2021-01-01 RX ORDER — METOLAZONE 5 MG/1
TABLET ORAL
Qty: 45 TABLET | Refills: 3 | OUTPATIENT
Start: 2021-01-01 | End: 2021-01-01 | Stop reason: HOSPADM

## 2021-01-01 RX ORDER — ALBUMIN (HUMAN) 12.5 G/50ML
12.5 SOLUTION INTRAVENOUS ONCE
Status: COMPLETED | OUTPATIENT
Start: 2021-01-01 | End: 2021-01-01

## 2021-01-01 RX ORDER — LISINOPRIL 5 MG/1
5 TABLET ORAL DAILY
Qty: 30 TABLET | Refills: 0 | Status: SHIPPED | OUTPATIENT
Start: 2021-01-01 | End: 2021-01-01

## 2021-01-01 RX ORDER — COLCHICINE 0.6 MG/1
TABLET ORAL
Qty: 90 TABLET | Refills: 1 | Status: SHIPPED | OUTPATIENT
Start: 2021-01-01 | End: 2021-01-01

## 2021-01-01 RX ORDER — CALCIUM CARBONATE 200(500)MG
500 TABLET,CHEWABLE ORAL 2 TIMES DAILY PRN
Status: DISCONTINUED | OUTPATIENT
Start: 2021-01-01 | End: 2021-01-01 | Stop reason: HOSPADM

## 2021-01-01 RX ORDER — TRAMADOL HYDROCHLORIDE 50 MG/1
50 TABLET ORAL EVERY 8 HOURS PRN
Qty: 10 TABLET | Refills: 0 | Status: SHIPPED | OUTPATIENT
Start: 2021-01-01 | End: 2021-01-01

## 2021-01-01 RX ORDER — ALLOPURINOL 100 MG/1
100 TABLET ORAL 2 TIMES DAILY
Qty: 30 TABLET | Refills: 0 | Status: SHIPPED | OUTPATIENT
Start: 2021-01-01 | End: 2021-01-01

## 2021-01-01 RX ORDER — POTASSIUM CHLORIDE 20 MEQ/1
20 TABLET, EXTENDED RELEASE ORAL 2 TIMES DAILY WITH MEALS
Status: COMPLETED | OUTPATIENT
Start: 2021-01-01 | End: 2021-01-01

## 2021-01-01 RX ORDER — MIDODRINE HYDROCHLORIDE 10 MG/1
10 TABLET ORAL
Qty: 90 TABLET | Refills: 0 | Status: SHIPPED | OUTPATIENT
Start: 2021-01-01 | End: 2021-01-01

## 2021-01-01 RX ORDER — METOPROLOL SUCCINATE 25 MG/1
TABLET, EXTENDED RELEASE ORAL
Qty: 60 TABLET | Refills: 0 | OUTPATIENT
Start: 2021-01-01

## 2021-01-01 RX ORDER — BUMETANIDE 0.25 MG/ML
1 INJECTION, SOLUTION INTRAMUSCULAR; INTRAVENOUS 2 TIMES DAILY
Status: DISCONTINUED | OUTPATIENT
Start: 2021-01-01 | End: 2021-01-01

## 2021-01-01 RX ORDER — METOLAZONE 2.5 MG/1
2.5 TABLET ORAL ONCE
Status: COMPLETED | OUTPATIENT
Start: 2021-01-01 | End: 2021-01-01

## 2021-01-01 RX ORDER — FUROSEMIDE 10 MG/ML
80 INJECTION INTRAMUSCULAR; INTRAVENOUS ONCE
Status: DISCONTINUED | OUTPATIENT
Start: 2021-01-01 | End: 2021-01-01

## 2021-01-01 RX ORDER — FUROSEMIDE 40 MG/1
120 TABLET ORAL 2 TIMES DAILY
Qty: 1 TABLET | Refills: 0 | Status: ON HOLD
Start: 2021-01-01 | End: 2022-01-01 | Stop reason: SDUPTHER

## 2021-01-01 RX ORDER — METOLAZONE 5 MG/1
5 TABLET ORAL AS NEEDED
Status: ON HOLD | COMMUNITY
End: 2022-01-01

## 2021-01-01 RX ORDER — EPHEDRINE SULFATE 50 MG/ML
INJECTION INTRAVENOUS AS NEEDED
Status: DISCONTINUED | OUTPATIENT
Start: 2021-01-01 | End: 2021-01-01

## 2021-01-01 RX ORDER — LISINOPRIL 5 MG/1
TABLET ORAL
Qty: 30 TABLET | Refills: 0 | Status: SHIPPED | OUTPATIENT
Start: 2021-01-01 | End: 2021-01-01 | Stop reason: SDUPTHER

## 2021-01-01 RX ORDER — OXYCODONE HYDROCHLORIDE 10 MG/1
10 TABLET ORAL EVERY 4 HOURS PRN
Status: DISCONTINUED | OUTPATIENT
Start: 2021-01-01 | End: 2021-01-01 | Stop reason: HOSPADM

## 2021-01-01 RX ORDER — FUROSEMIDE 80 MG
80 TABLET ORAL
Status: DISCONTINUED | OUTPATIENT
Start: 2021-01-01 | End: 2021-01-01

## 2021-01-01 RX ORDER — GLYCOPYRROLATE 0.2 MG/ML
INJECTION INTRAMUSCULAR; INTRAVENOUS AS NEEDED
Status: DISCONTINUED | OUTPATIENT
Start: 2021-01-01 | End: 2021-01-01

## 2021-01-01 RX ORDER — OXYCODONE HYDROCHLORIDE 5 MG/1
5 TABLET ORAL EVERY 4 HOURS PRN
Status: DISCONTINUED | OUTPATIENT
Start: 2021-01-01 | End: 2021-01-01 | Stop reason: HOSPADM

## 2021-01-01 RX ORDER — MIDODRINE HYDROCHLORIDE 10 MG/1
10 TABLET ORAL 2 TIMES DAILY
Qty: 90 TABLET | Refills: 0
Start: 2021-01-01 | End: 2021-01-01 | Stop reason: SDUPTHER

## 2021-01-01 RX ORDER — ALBUMIN (HUMAN) 12.5 G/50ML
25 SOLUTION INTRAVENOUS EVERY 8 HOURS
Status: COMPLETED | OUTPATIENT
Start: 2021-01-01 | End: 2021-01-01

## 2021-01-01 RX ORDER — ACETAMINOPHEN 325 MG/1
650 TABLET ORAL EVERY 6 HOURS PRN
Status: CANCELLED | OUTPATIENT
Start: 2021-01-01

## 2021-01-01 RX ORDER — HEPARIN SODIUM 5000 [USP'U]/ML
7500 INJECTION, SOLUTION INTRAVENOUS; SUBCUTANEOUS EVERY 8 HOURS SCHEDULED
Status: DISCONTINUED | OUTPATIENT
Start: 2021-01-01 | End: 2021-01-01 | Stop reason: HOSPADM

## 2021-01-01 RX ORDER — FUROSEMIDE 10 MG/ML
40 SOLUTION ORAL
Status: DISCONTINUED | OUTPATIENT
Start: 2021-01-01 | End: 2021-01-01

## 2021-01-01 RX ORDER — CITALOPRAM 10 MG/1
TABLET ORAL
COMMUNITY
Start: 2021-01-01

## 2021-01-01 RX ORDER — LANCETS 28 GAUGE
EACH MISCELLANEOUS
Qty: 100 EACH | Refills: 3 | Status: SHIPPED | OUTPATIENT
Start: 2021-01-01

## 2021-01-01 RX ORDER — MIDODRINE HYDROCHLORIDE 5 MG/1
5 TABLET ORAL EVERY 8 HOURS
Status: DISCONTINUED | OUTPATIENT
Start: 2021-01-01 | End: 2021-01-01

## 2021-01-01 RX ORDER — FUROSEMIDE 40 MG/1
80 TABLET ORAL 2 TIMES DAILY
Qty: 120 TABLET | Refills: 0 | Status: SHIPPED | OUTPATIENT
Start: 2021-01-01 | End: 2021-01-01 | Stop reason: HOSPADM

## 2021-01-01 RX ORDER — BUMETANIDE 0.25 MG/ML
2 INJECTION, SOLUTION INTRAMUSCULAR; INTRAVENOUS ONCE
Status: DISCONTINUED | OUTPATIENT
Start: 2021-01-01 | End: 2021-01-01

## 2021-01-01 RX ORDER — DILTIAZEM HYDROCHLORIDE 5 MG/ML
15 INJECTION INTRAVENOUS ONCE
Status: COMPLETED | OUTPATIENT
Start: 2021-01-01 | End: 2021-01-01

## 2021-01-01 RX ORDER — METOPROLOL SUCCINATE 25 MG/1
25 TABLET, EXTENDED RELEASE ORAL 2 TIMES DAILY
Status: DISCONTINUED | OUTPATIENT
Start: 2021-01-01 | End: 2021-01-01

## 2021-01-01 RX ORDER — SUCCINYLCHOLINE/SOD CL,ISO/PF 100 MG/5ML
SYRINGE (ML) INTRAVENOUS AS NEEDED
Status: DISCONTINUED | OUTPATIENT
Start: 2021-01-01 | End: 2021-01-01

## 2021-01-01 RX ORDER — POLYETHYLENE GLYCOL 3350 17 G/17G
17 POWDER, FOR SOLUTION ORAL ONCE
Status: COMPLETED | OUTPATIENT
Start: 2021-01-01 | End: 2021-01-01

## 2021-01-01 RX ORDER — SODIUM CHLORIDE 9 MG/ML
50 INJECTION, SOLUTION INTRAVENOUS CONTINUOUS
Status: DISCONTINUED | OUTPATIENT
Start: 2021-01-01 | End: 2021-01-01

## 2021-01-01 RX ORDER — CEFEPIME HYDROCHLORIDE 1 G/50ML
1000 INJECTION, SOLUTION INTRAVENOUS EVERY 24 HOURS
Status: DISCONTINUED | OUTPATIENT
Start: 2021-01-01 | End: 2021-01-01

## 2021-01-01 RX ORDER — ALBUMIN, HUMAN INJ 5% 5 %
SOLUTION INTRAVENOUS CONTINUOUS PRN
Status: DISCONTINUED | OUTPATIENT
Start: 2021-01-01 | End: 2021-01-01

## 2021-01-01 RX ORDER — CALCIUM CARBONATE 200(500)MG
1000 TABLET,CHEWABLE ORAL DAILY PRN
Status: CANCELLED | OUTPATIENT
Start: 2021-01-01

## 2021-01-01 RX ORDER — OXYCODONE HYDROCHLORIDE AND ACETAMINOPHEN 5; 325 MG/1; MG/1
1 TABLET ORAL EVERY 6 HOURS PRN
Status: CANCELLED | OUTPATIENT
Start: 2021-01-01

## 2021-01-01 RX ORDER — WARFARIN SODIUM 5 MG/1
5 TABLET ORAL EVERY OTHER DAY
Status: ON HOLD | COMMUNITY
End: 2021-01-01 | Stop reason: SDUPTHER

## 2021-01-01 RX ORDER — FUROSEMIDE 10 MG/ML
10 SYRINGE (ML) INJECTION CONTINUOUS
Status: DISCONTINUED | OUTPATIENT
Start: 2021-01-01 | End: 2021-01-01

## 2021-01-01 RX ORDER — METOLAZONE 2.5 MG/1
2.5 TABLET ORAL DAILY
Status: DISCONTINUED | OUTPATIENT
Start: 2021-01-01 | End: 2021-01-01

## 2021-01-01 RX ORDER — PANTOPRAZOLE SODIUM 40 MG/1
40 TABLET, DELAYED RELEASE ORAL
Status: DISCONTINUED | OUTPATIENT
Start: 2021-01-01 | End: 2021-01-01 | Stop reason: HOSPADM

## 2021-01-01 RX ORDER — FENTANYL CITRATE 50 UG/ML
INJECTION, SOLUTION INTRAMUSCULAR; INTRAVENOUS CODE/TRAUMA/SEDATION MEDICATION
Status: COMPLETED | OUTPATIENT
Start: 2021-01-01 | End: 2021-01-01

## 2021-01-01 RX ORDER — LIDOCAINE 50 MG/G
1 PATCH TOPICAL DAILY PRN
Status: DISCONTINUED | OUTPATIENT
Start: 2021-01-01 | End: 2021-01-01

## 2021-01-01 RX ORDER — METOPROLOL TARTRATE 5 MG/5ML
2.5 INJECTION INTRAVENOUS EVERY 6 HOURS PRN
Status: DISCONTINUED | OUTPATIENT
Start: 2021-01-01 | End: 2021-01-01 | Stop reason: HOSPADM

## 2021-01-01 RX ORDER — ACETAMINOPHEN 325 MG/1
650 TABLET ORAL EVERY 6 HOURS PRN
Status: DISCONTINUED | OUTPATIENT
Start: 2021-01-01 | End: 2021-01-01 | Stop reason: HOSPADM

## 2021-01-01 RX ORDER — BUPIVACAINE HYDROCHLORIDE 2.5 MG/ML
INJECTION, SOLUTION EPIDURAL; INFILTRATION; INTRACAUDAL AS NEEDED
Status: DISCONTINUED | OUTPATIENT
Start: 2021-01-01 | End: 2021-01-01 | Stop reason: HOSPADM

## 2021-01-01 RX ORDER — FUROSEMIDE 80 MG
80 TABLET ORAL 2 TIMES DAILY
Qty: 60 TABLET | Refills: 0 | Status: SHIPPED | OUTPATIENT
Start: 2021-01-01 | End: 2021-01-01 | Stop reason: SDUPTHER

## 2021-01-01 RX ORDER — SODIUM CHLORIDE, SODIUM LACTATE, POTASSIUM CHLORIDE, CALCIUM CHLORIDE 600; 310; 30; 20 MG/100ML; MG/100ML; MG/100ML; MG/100ML
INJECTION, SOLUTION INTRAVENOUS CONTINUOUS PRN
Status: DISCONTINUED | OUTPATIENT
Start: 2021-01-01 | End: 2021-01-01

## 2021-01-01 RX ORDER — ACETAZOLAMIDE 250 MG/1
250 TABLET ORAL EVERY 12 HOURS SCHEDULED
Status: COMPLETED | OUTPATIENT
Start: 2021-01-01 | End: 2021-01-01

## 2021-01-01 RX ORDER — FUROSEMIDE 80 MG
80 TABLET ORAL 2 TIMES DAILY
Status: DISCONTINUED | OUTPATIENT
Start: 2021-01-01 | End: 2021-01-01

## 2021-01-01 RX ORDER — MIDAZOLAM HYDROCHLORIDE 2 MG/2ML
INJECTION, SOLUTION INTRAMUSCULAR; INTRAVENOUS AS NEEDED
Status: DISCONTINUED | OUTPATIENT
Start: 2021-01-01 | End: 2021-01-01

## 2021-01-01 RX ORDER — MELATONIN
2000 DAILY
Qty: 60 TABLET | Refills: 0 | Status: SHIPPED | OUTPATIENT
Start: 2021-01-01 | End: 2021-01-01

## 2021-01-01 RX ORDER — BISACODYL 10 MG
10 SUPPOSITORY, RECTAL RECTAL DAILY PRN
Status: DISCONTINUED | OUTPATIENT
Start: 2021-01-01 | End: 2021-01-01 | Stop reason: HOSPADM

## 2021-01-01 RX ORDER — OMEPRAZOLE 40 MG/1
CAPSULE, DELAYED RELEASE ORAL
Qty: 180 CAPSULE | OUTPATIENT
Start: 2021-01-01

## 2021-01-01 RX ORDER — OXYCODONE HYDROCHLORIDE 5 MG/1
5 TABLET ORAL ONCE
Status: COMPLETED | OUTPATIENT
Start: 2021-01-01 | End: 2021-01-01

## 2021-01-01 RX ORDER — POLYETHYLENE GLYCOL 3350 17 G/17G
17 POWDER, FOR SOLUTION ORAL DAILY
Status: DISCONTINUED | OUTPATIENT
Start: 2021-01-01 | End: 2021-01-01

## 2021-01-01 RX ORDER — POTASSIUM CHLORIDE 20 MEQ/1
40 TABLET, EXTENDED RELEASE ORAL
Status: DISCONTINUED | OUTPATIENT
Start: 2021-01-01 | End: 2021-01-01 | Stop reason: HOSPADM

## 2021-01-01 RX ORDER — MIDODRINE HYDROCHLORIDE 5 MG/1
10 TABLET ORAL
Status: DISCONTINUED | OUTPATIENT
Start: 2021-01-01 | End: 2021-01-01

## 2021-01-01 RX ORDER — WARFARIN SODIUM 5 MG/1
TABLET ORAL
Qty: 30 TABLET | Refills: 0 | Status: SHIPPED | OUTPATIENT
Start: 2021-01-01

## 2021-01-01 RX ORDER — DIGOXIN 0.25 MG/ML
250 INJECTION INTRAMUSCULAR; INTRAVENOUS EVERY 8 HOURS
Status: COMPLETED | OUTPATIENT
Start: 2021-01-01 | End: 2021-01-01

## 2021-01-01 RX ORDER — POTASSIUM CHLORIDE 20 MEQ/1
TABLET, EXTENDED RELEASE ORAL
Start: 2021-01-01 | End: 2021-01-01 | Stop reason: SDUPTHER

## 2021-01-01 RX ORDER — FERROUS SULFATE TAB EC 324 MG (65 MG FE EQUIVALENT) 324 (65 FE) MG
324 TABLET DELAYED RESPONSE ORAL
Qty: 60 TABLET | Refills: 5 | Status: SHIPPED | OUTPATIENT
Start: 2021-01-01 | End: 2021-01-01 | Stop reason: SDUPTHER

## 2021-01-01 RX ORDER — TRAMADOL HYDROCHLORIDE 50 MG/1
50 TABLET ORAL ONCE
Status: COMPLETED | OUTPATIENT
Start: 2021-01-01 | End: 2021-01-01

## 2021-01-01 RX ORDER — ONDANSETRON 2 MG/ML
4 INJECTION INTRAMUSCULAR; INTRAVENOUS EVERY 6 HOURS PRN
Status: CANCELLED | OUTPATIENT
Start: 2021-01-01

## 2021-01-01 RX ORDER — METOPROLOL SUCCINATE 25 MG/1
TABLET, EXTENDED RELEASE ORAL
Qty: 60 TABLET | Refills: 0 | Status: SHIPPED | OUTPATIENT
Start: 2021-01-01 | End: 2021-01-01 | Stop reason: SDUPTHER

## 2021-01-01 RX ORDER — ALLOPURINOL 100 MG/1
100 TABLET ORAL 2 TIMES DAILY
Status: DISCONTINUED | OUTPATIENT
Start: 2021-01-01 | End: 2021-01-01 | Stop reason: HOSPADM

## 2021-01-01 RX ORDER — SODIUM CHLORIDE 9 MG/ML
20 INJECTION, SOLUTION INTRAVENOUS ONCE
Status: COMPLETED | OUTPATIENT
Start: 2021-01-01 | End: 2021-01-01

## 2021-01-01 RX ORDER — HYDROMORPHONE HCL/PF 1 MG/ML
0.5 SYRINGE (ML) INJECTION
Status: DISCONTINUED | OUTPATIENT
Start: 2021-01-01 | End: 2021-01-01 | Stop reason: HOSPADM

## 2021-01-01 RX ORDER — PROPOFOL 10 MG/ML
INJECTION, EMULSION INTRAVENOUS CONTINUOUS PRN
Status: DISCONTINUED | OUTPATIENT
Start: 2021-01-01 | End: 2021-01-01

## 2021-01-01 RX ORDER — MAGNESIUM SULFATE HEPTAHYDRATE 40 MG/ML
2 INJECTION, SOLUTION INTRAVENOUS ONCE
Status: DISCONTINUED | OUTPATIENT
Start: 2021-01-01 | End: 2021-01-01

## 2021-01-01 RX ORDER — POLYETHYLENE GLYCOL 3350 17 G/17G
17 POWDER, FOR SOLUTION ORAL DAILY PRN
Status: CANCELLED | OUTPATIENT
Start: 2021-01-01

## 2021-01-01 RX ORDER — METOPROLOL SUCCINATE 25 MG/1
25 TABLET, EXTENDED RELEASE ORAL 2 TIMES DAILY
Qty: 60 TABLET | Refills: 5 | Status: SHIPPED | OUTPATIENT
Start: 2021-01-01

## 2021-01-01 RX ORDER — POTASSIUM CHLORIDE 20 MEQ/1
40 TABLET, EXTENDED RELEASE ORAL 2 TIMES DAILY WITH MEALS
Status: DISCONTINUED | OUTPATIENT
Start: 2021-01-01 | End: 2021-01-01

## 2021-01-01 RX ORDER — CEFAZOLIN SODIUM 2 G/50ML
2000 SOLUTION INTRAVENOUS EVERY 8 HOURS
Status: COMPLETED | OUTPATIENT
Start: 2021-01-01 | End: 2021-01-01

## 2021-01-01 RX ORDER — OCTREOTIDE ACETATE 100 UG/ML
50 INJECTION, SOLUTION INTRAVENOUS; SUBCUTANEOUS EVERY 8 HOURS SCHEDULED
Status: DISCONTINUED | OUTPATIENT
Start: 2021-01-01 | End: 2021-01-01

## 2021-01-01 RX ORDER — METOPROLOL TARTRATE 5 MG/5ML
2.5 INJECTION INTRAVENOUS ONCE
Status: COMPLETED | OUTPATIENT
Start: 2021-01-01 | End: 2021-01-01

## 2021-01-01 RX ORDER — BUMETANIDE 1 MG/1
TABLET ORAL
Qty: 180 TABLET | Refills: 1 | Status: SHIPPED | OUTPATIENT
Start: 2021-01-01 | End: 2021-01-01

## 2021-01-01 RX ORDER — MIDODRINE HYDROCHLORIDE 5 MG/1
10 TABLET ORAL
Status: DISCONTINUED | OUTPATIENT
Start: 2021-01-01 | End: 2021-01-01 | Stop reason: HOSPADM

## 2021-01-01 RX ORDER — OXYCODONE HYDROCHLORIDE AND ACETAMINOPHEN 5; 325 MG/1; MG/1
1 TABLET ORAL EVERY 6 HOURS PRN
Status: DISCONTINUED | OUTPATIENT
Start: 2021-01-01 | End: 2021-01-01 | Stop reason: HOSPADM

## 2021-01-01 RX ORDER — POTASSIUM CHLORIDE 20 MEQ/1
20 TABLET, EXTENDED RELEASE ORAL
Status: COMPLETED | OUTPATIENT
Start: 2021-01-01 | End: 2021-01-01

## 2021-01-01 RX ORDER — METOPROLOL SUCCINATE 25 MG/1
25 TABLET, EXTENDED RELEASE ORAL 2 TIMES DAILY
Status: DISCONTINUED | OUTPATIENT
Start: 2021-01-01 | End: 2021-01-01 | Stop reason: HOSPADM

## 2021-01-01 RX ORDER — MIDODRINE HYDROCHLORIDE 5 MG/1
10 TABLET ORAL EVERY 8 HOURS
Status: DISCONTINUED | OUTPATIENT
Start: 2021-01-01 | End: 2021-01-01

## 2021-01-01 RX ORDER — ACETAMINOPHEN 325 MG/1
650 TABLET ORAL EVERY 6 HOURS SCHEDULED
Status: DISCONTINUED | OUTPATIENT
Start: 2021-01-01 | End: 2021-01-01 | Stop reason: HOSPADM

## 2021-01-01 RX ORDER — ALBUMIN, HUMAN INJ 5% 5 %
12.5 SOLUTION INTRAVENOUS 2 TIMES DAILY
Status: DISCONTINUED | OUTPATIENT
Start: 2021-01-01 | End: 2021-01-01

## 2021-01-01 RX ORDER — SODIUM CHLORIDE, SODIUM LACTATE, POTASSIUM CHLORIDE, CALCIUM CHLORIDE 600; 310; 30; 20 MG/100ML; MG/100ML; MG/100ML; MG/100ML
125 INJECTION, SOLUTION INTRAVENOUS CONTINUOUS
Status: DISCONTINUED | OUTPATIENT
Start: 2021-01-01 | End: 2021-01-01

## 2021-01-01 RX ORDER — LIDOCAINE 40 MG/G
CREAM TOPICAL DAILY PRN
Status: DISCONTINUED | OUTPATIENT
Start: 2021-01-01 | End: 2021-01-01 | Stop reason: HOSPADM

## 2021-01-01 RX ORDER — WARFARIN SODIUM 2.5 MG/1
2.5 TABLET ORAL
Status: COMPLETED | OUTPATIENT
Start: 2021-01-01 | End: 2021-01-01

## 2021-01-01 RX ORDER — LIDOCAINE HYDROCHLORIDE 10 MG/ML
INJECTION, SOLUTION EPIDURAL; INFILTRATION; INTRACAUDAL; PERINEURAL
Status: COMPLETED | OUTPATIENT
Start: 2021-01-01 | End: 2021-01-01

## 2021-01-01 RX ORDER — METOPROLOL SUCCINATE 25 MG/1
25 TABLET, EXTENDED RELEASE ORAL 2 TIMES DAILY
Qty: 60 TABLET | Refills: 0 | Status: SHIPPED | OUTPATIENT
Start: 2021-01-01 | End: 2021-01-01 | Stop reason: SDUPTHER

## 2021-01-01 RX ORDER — METOPROLOL TARTRATE 50 MG/1
50 TABLET, FILM COATED ORAL EVERY 12 HOURS SCHEDULED
Status: DISCONTINUED | OUTPATIENT
Start: 2021-01-01 | End: 2021-01-01 | Stop reason: HOSPADM

## 2021-01-01 RX ORDER — MIDODRINE HYDROCHLORIDE 5 MG/1
15 TABLET ORAL EVERY 8 HOURS
Refills: 0
Start: 2021-01-01

## 2021-01-01 RX ORDER — DIGOXIN 250 MCG
250 TABLET ORAL EVERY OTHER DAY
Status: DISCONTINUED | OUTPATIENT
Start: 2021-01-01 | End: 2021-01-01

## 2021-01-01 RX ORDER — POTASSIUM CHLORIDE 750 MG/1
20 TABLET, EXTENDED RELEASE ORAL 2 TIMES DAILY
Qty: 360 TABLET | Refills: 2 | Status: SHIPPED | OUTPATIENT
Start: 2021-01-01 | End: 2021-01-01

## 2021-01-01 RX ORDER — FUROSEMIDE 40 MG/1
40 TABLET ORAL 2 TIMES DAILY
Status: DISCONTINUED | OUTPATIENT
Start: 2021-01-01 | End: 2021-01-01

## 2021-01-01 RX ORDER — MIDODRINE HYDROCHLORIDE 5 MG/1
5 TABLET ORAL ONCE
Status: COMPLETED | OUTPATIENT
Start: 2021-01-01 | End: 2021-01-01

## 2021-01-01 RX ORDER — FAMOTIDINE 20 MG/1
20 TABLET, FILM COATED ORAL ONCE
Status: COMPLETED | OUTPATIENT
Start: 2021-01-01 | End: 2021-01-01

## 2021-01-01 RX ORDER — HYDROMORPHONE HCL/PF 1 MG/ML
0.2 SYRINGE (ML) INJECTION ONCE
Status: COMPLETED | OUTPATIENT
Start: 2021-01-01 | End: 2021-01-01

## 2021-01-01 RX ORDER — FUROSEMIDE 40 MG/1
80 TABLET ORAL
Status: DISCONTINUED | OUTPATIENT
Start: 2021-01-01 | End: 2021-01-01 | Stop reason: HOSPADM

## 2021-01-01 RX ORDER — FUROSEMIDE 10 MG/ML
20 INJECTION INTRAMUSCULAR; INTRAVENOUS
Status: DISCONTINUED | OUTPATIENT
Start: 2021-01-01 | End: 2021-01-01

## 2021-01-01 RX ORDER — PHYTONADIONE 5 MG/1
5 TABLET ORAL ONCE
Status: COMPLETED | OUTPATIENT
Start: 2021-01-01 | End: 2021-01-01

## 2021-01-01 RX ORDER — AMOXICILLIN 250 MG
1 CAPSULE ORAL
Status: DISCONTINUED | OUTPATIENT
Start: 2021-01-01 | End: 2021-01-01

## 2021-01-01 RX ORDER — CARVEDILOL 6.25 MG/1
6.25 TABLET ORAL 2 TIMES DAILY WITH MEALS
Qty: 180 TABLET | Refills: 3 | OUTPATIENT
Start: 2021-01-01 | End: 2021-01-01 | Stop reason: HOSPADM

## 2021-01-01 RX ORDER — FUROSEMIDE 40 MG/1
80 TABLET ORAL 2 TIMES DAILY
Status: DISCONTINUED | OUTPATIENT
Start: 2021-01-01 | End: 2021-01-01

## 2021-01-01 RX ORDER — MELATONIN
2000 DAILY
Status: DISCONTINUED | OUTPATIENT
Start: 2021-01-01 | End: 2021-01-01 | Stop reason: HOSPADM

## 2021-01-01 RX ORDER — LISINOPRIL 5 MG/1
5 TABLET ORAL DAILY
Status: DISCONTINUED | OUTPATIENT
Start: 2021-01-01 | End: 2021-01-01

## 2021-01-01 RX ORDER — HYDROMORPHONE HCL/PF 1 MG/ML
0.5 SYRINGE (ML) INJECTION EVERY 6 HOURS PRN
Status: DISCONTINUED | OUTPATIENT
Start: 2021-01-01 | End: 2021-01-01 | Stop reason: HOSPADM

## 2021-01-01 RX ORDER — BUMETANIDE 1 MG/1
1 TABLET ORAL 2 TIMES DAILY
Qty: 30 TABLET | Refills: 0 | Status: SHIPPED | OUTPATIENT
Start: 2021-01-01 | End: 2021-01-01

## 2021-01-01 RX ORDER — HYDROMORPHONE HCL IN WATER/PF 6 MG/30 ML
0.2 PATIENT CONTROLLED ANALGESIA SYRINGE INTRAVENOUS EVERY 6 HOURS PRN
Status: DISCONTINUED | OUTPATIENT
Start: 2021-01-01 | End: 2021-01-01 | Stop reason: HOSPADM

## 2021-01-01 RX ORDER — POTASSIUM CHLORIDE 20 MEQ/1
20 TABLET, EXTENDED RELEASE ORAL 2 TIMES DAILY
Qty: 60 TABLET | Refills: 0 | Status: SHIPPED | OUTPATIENT
Start: 2021-01-01 | End: 2021-01-01 | Stop reason: SDUPTHER

## 2021-01-01 RX ORDER — DEXAMETHASONE SODIUM PHOSPHATE 4 MG/ML
INJECTION, SOLUTION INTRA-ARTICULAR; INTRALESIONAL; INTRAMUSCULAR; INTRAVENOUS; SOFT TISSUE AS NEEDED
Status: DISCONTINUED | OUTPATIENT
Start: 2021-01-01 | End: 2021-01-01

## 2021-01-01 RX ORDER — MIDODRINE HYDROCHLORIDE 5 MG/1
7.5 TABLET ORAL
Status: DISCONTINUED | OUTPATIENT
Start: 2021-01-01 | End: 2021-01-01

## 2021-01-01 RX ORDER — SODIUM CHLORIDE, SODIUM GLUCONATE, SODIUM ACETATE, POTASSIUM CHLORIDE, MAGNESIUM CHLORIDE, SODIUM PHOSPHATE, DIBASIC, AND POTASSIUM PHOSPHATE .53; .5; .37; .037; .03; .012; .00082 G/100ML; G/100ML; G/100ML; G/100ML; G/100ML; G/100ML; G/100ML
1000 INJECTION, SOLUTION INTRAVENOUS ONCE
Status: COMPLETED | OUTPATIENT
Start: 2021-01-01 | End: 2021-01-01

## 2021-01-01 RX ORDER — ALBUMIN, HUMAN INJ 5% 5 %
25 SOLUTION INTRAVENOUS EVERY 6 HOURS
Status: DISCONTINUED | OUTPATIENT
Start: 2021-01-01 | End: 2021-01-01

## 2021-01-01 RX ORDER — CALCIUM CARBONATE 200(500)MG
1000 TABLET,CHEWABLE ORAL DAILY PRN
Status: DISCONTINUED | OUTPATIENT
Start: 2021-01-01 | End: 2021-01-01 | Stop reason: HOSPADM

## 2021-01-01 RX ORDER — ALBUMIN (HUMAN) 12.5 G/50ML
25 SOLUTION INTRAVENOUS EVERY 6 HOURS
Status: COMPLETED | OUTPATIENT
Start: 2021-01-01 | End: 2021-01-01

## 2021-01-01 RX ORDER — ACETAMINOPHEN 500 MG
500 TABLET ORAL EVERY 6 HOURS PRN
COMMUNITY
End: 2021-01-01

## 2021-01-01 RX ORDER — CALCIUM CARBONATE 200(500)MG
500 TABLET,CHEWABLE ORAL DAILY PRN
Status: DISCONTINUED | OUTPATIENT
Start: 2021-01-01 | End: 2021-01-01

## 2021-01-01 RX ORDER — FUROSEMIDE 80 MG
80 TABLET ORAL 2 TIMES DAILY
Status: DISCONTINUED | OUTPATIENT
Start: 2021-01-01 | End: 2021-01-01 | Stop reason: HOSPADM

## 2021-01-01 RX ORDER — LISINOPRIL 5 MG/1
5 TABLET ORAL DAILY
Qty: 90 TABLET | Refills: 3 | Status: SHIPPED | OUTPATIENT
Start: 2021-01-01 | End: 2021-01-01 | Stop reason: SDUPTHER

## 2021-01-01 RX ORDER — ALBUMIN, HUMAN INJ 5% 5 %
SOLUTION INTRAVENOUS
Status: COMPLETED
Start: 2021-01-01 | End: 2021-01-01

## 2021-01-01 RX ORDER — FUROSEMIDE 40 MG/1
120 TABLET ORAL
Status: DISCONTINUED | OUTPATIENT
Start: 2021-01-01 | End: 2021-01-01 | Stop reason: HOSPADM

## 2021-01-01 RX ORDER — POTASSIUM CHLORIDE 20 MEQ/1
40 TABLET, EXTENDED RELEASE ORAL EVERY 4 HOURS
Status: COMPLETED | OUTPATIENT
Start: 2021-01-01 | End: 2021-01-01

## 2021-01-01 RX ADMIN — GLYCOPYRROLATE 0.4 MG: 0.2 INJECTION, SOLUTION INTRAMUSCULAR; INTRAVENOUS at 00:50

## 2021-01-01 RX ADMIN — WARFARIN SODIUM 5 MG: 5 TABLET ORAL at 17:41

## 2021-01-01 RX ADMIN — POTASSIUM CHLORIDE 20 MEQ: 1500 TABLET, EXTENDED RELEASE ORAL at 18:13

## 2021-01-01 RX ADMIN — MIDODRINE HYDROCHLORIDE 10 MG: 5 TABLET ORAL at 11:19

## 2021-01-01 RX ADMIN — SENNOSIDES AND DOCUSATE SODIUM 2 TABLET: 50; 8.6 TABLET ORAL at 18:14

## 2021-01-01 RX ADMIN — OXYCODONE HYDROCHLORIDE 5 MG: 5 TABLET ORAL at 00:54

## 2021-01-01 RX ADMIN — POTASSIUM CHLORIDE 20 MEQ: 20 TABLET, EXTENDED RELEASE ORAL at 11:51

## 2021-01-01 RX ADMIN — OXYCODONE HYDROCHLORIDE 5 MG: 5 TABLET ORAL at 11:59

## 2021-01-01 RX ADMIN — HEPARIN SODIUM 5000 UNITS: 5000 INJECTION INTRAVENOUS; SUBCUTANEOUS at 05:20

## 2021-01-01 RX ADMIN — POTASSIUM CHLORIDE 20 MEQ: 20 TABLET, EXTENDED RELEASE ORAL at 07:54

## 2021-01-01 RX ADMIN — Medication 10 MG/HR: at 12:07

## 2021-01-01 RX ADMIN — BISACODYL 10 MG: 5 TABLET, COATED ORAL at 17:20

## 2021-01-01 RX ADMIN — ACETAMINOPHEN 650 MG: 325 TABLET, FILM COATED ORAL at 23:57

## 2021-01-01 RX ADMIN — HYDROMORPHONE HYDROCHLORIDE 0.2 MG: 0.2 INJECTION, SOLUTION INTRAMUSCULAR; INTRAVENOUS; SUBCUTANEOUS at 01:45

## 2021-01-01 RX ADMIN — POTASSIUM CHLORIDE 40 MEQ: 1500 TABLET, EXTENDED RELEASE ORAL at 08:44

## 2021-01-01 RX ADMIN — HYDROMORPHONE HYDROCHLORIDE 0.2 MG: 0.2 INJECTION, SOLUTION INTRAMUSCULAR; INTRAVENOUS; SUBCUTANEOUS at 19:47

## 2021-01-01 RX ADMIN — BISACODYL 10 MG: 5 TABLET, COATED ORAL at 17:24

## 2021-01-01 RX ADMIN — HYDROMORPHONE HYDROCHLORIDE 0.5 MG: 1 INJECTION, SOLUTION INTRAMUSCULAR; INTRAVENOUS; SUBCUTANEOUS at 19:55

## 2021-01-01 RX ADMIN — Medication 10 MG: at 22:37

## 2021-01-01 RX ADMIN — DIGOXIN 250 MCG: 250 TABLET ORAL at 08:53

## 2021-01-01 RX ADMIN — ACETAMINOPHEN 650 MG: 325 TABLET, FILM COATED ORAL at 17:31

## 2021-01-01 RX ADMIN — PANTOPRAZOLE SODIUM 40 MG: 40 TABLET, DELAYED RELEASE ORAL at 05:33

## 2021-01-01 RX ADMIN — PANTOPRAZOLE SODIUM 40 MG: 40 TABLET, DELAYED RELEASE ORAL at 05:16

## 2021-01-01 RX ADMIN — ACETAMINOPHEN 975 MG: 325 TABLET, FILM COATED ORAL at 15:05

## 2021-01-01 RX ADMIN — MIDODRINE HYDROCHLORIDE 7.5 MG: 5 TABLET ORAL at 10:41

## 2021-01-01 RX ADMIN — DOCUSATE SODIUM AND SENNOSIDES 1 TABLET: 8.6; 5 TABLET ORAL at 22:22

## 2021-01-01 RX ADMIN — ACETAMINOPHEN 650 MG: 325 TABLET, FILM COATED ORAL at 05:17

## 2021-01-01 RX ADMIN — MIDODRINE HYDROCHLORIDE 15 MG: 5 TABLET ORAL at 03:32

## 2021-01-01 RX ADMIN — ACETAMINOPHEN 650 MG: 325 TABLET, FILM COATED ORAL at 11:55

## 2021-01-01 RX ADMIN — OXYCODONE HYDROCHLORIDE 5 MG: 5 TABLET ORAL at 06:23

## 2021-01-01 RX ADMIN — DIGOXIN 250 MCG: 250 TABLET ORAL at 08:45

## 2021-01-01 RX ADMIN — MIDODRINE HYDROCHLORIDE 10 MG: 5 TABLET ORAL at 06:08

## 2021-01-01 RX ADMIN — CEFEPIME HYDROCHLORIDE 1000 MG: 1 INJECTION, SOLUTION INTRAVENOUS at 08:02

## 2021-01-01 RX ADMIN — OXYCODONE HYDROCHLORIDE AND ACETAMINOPHEN 1 TABLET: 5; 325 TABLET ORAL at 09:01

## 2021-01-01 RX ADMIN — MIDODRINE HYDROCHLORIDE 10 MG: 5 TABLET ORAL at 11:29

## 2021-01-01 RX ADMIN — OXYCODONE HYDROCHLORIDE 5 MG: 5 TABLET ORAL at 21:15

## 2021-01-01 RX ADMIN — OCTREOTIDE ACETATE 50 MCG: 100 INJECTION, SOLUTION INTRAVENOUS; SUBCUTANEOUS at 06:36

## 2021-01-01 RX ADMIN — DILTIAZEM HYDROCHLORIDE 15 MG/HR: 5 INJECTION INTRAVENOUS at 10:11

## 2021-01-01 RX ADMIN — OXYCODONE HYDROCHLORIDE 10 MG: 10 TABLET ORAL at 08:39

## 2021-01-01 RX ADMIN — POTASSIUM CHLORIDE 40 MEQ: 20 TABLET, EXTENDED RELEASE ORAL at 08:37

## 2021-01-01 RX ADMIN — CARVEDILOL 6.25 MG: 3.12 TABLET, FILM COATED ORAL at 08:53

## 2021-01-01 RX ADMIN — PHENYLEPHRINE HYDROCHLORIDE 200 MCG: 10 INJECTION INTRAVENOUS at 13:54

## 2021-01-01 RX ADMIN — OXYCODONE HYDROCHLORIDE 10 MG: 10 TABLET ORAL at 19:40

## 2021-01-01 RX ADMIN — PHENYLEPHRINE HYDROCHLORIDE 200 MCG: 10 INJECTION INTRAVENOUS at 14:09

## 2021-01-01 RX ADMIN — ALBUMIN (HUMAN) 12.5 G: 12.5 INJECTION, SOLUTION INTRAVENOUS at 02:37

## 2021-01-01 RX ADMIN — Medication 2000 UNITS: at 09:11

## 2021-01-01 RX ADMIN — FUROSEMIDE 40 MG: 10 INJECTION, SOLUTION INTRAVENOUS at 05:30

## 2021-01-01 RX ADMIN — HEPARIN SODIUM 7500 UNITS: 5000 INJECTION INTRAVENOUS; SUBCUTANEOUS at 05:34

## 2021-01-01 RX ADMIN — ALBUMIN (HUMAN) 25 G: 12.5 INJECTION, SOLUTION INTRAVENOUS at 22:02

## 2021-01-01 RX ADMIN — SENNOSIDES AND DOCUSATE SODIUM 2 TABLET: 50; 8.6 TABLET ORAL at 17:16

## 2021-01-01 RX ADMIN — METOPROLOL TARTRATE 50 MG: 50 TABLET, FILM COATED ORAL at 09:16

## 2021-01-01 RX ADMIN — ALLOPURINOL 100 MG: 100 TABLET ORAL at 08:20

## 2021-01-01 RX ADMIN — Medication 10 MG: at 22:13

## 2021-01-01 RX ADMIN — DOCUSATE SODIUM 100 MG: 100 CAPSULE, LIQUID FILLED ORAL at 08:23

## 2021-01-01 RX ADMIN — SODIUM CHLORIDE 100 ML/HR: 0.9 INJECTION, SOLUTION INTRAVENOUS at 11:05

## 2021-01-01 RX ADMIN — DOCUSATE SODIUM 100 MG: 100 CAPSULE, LIQUID FILLED ORAL at 18:00

## 2021-01-01 RX ADMIN — POTASSIUM CHLORIDE 40 MEQ: 20 TABLET, EXTENDED RELEASE ORAL at 11:47

## 2021-01-01 RX ADMIN — ENOXAPARIN SODIUM 40 MG: 40 INJECTION SUBCUTANEOUS at 08:59

## 2021-01-01 RX ADMIN — POTASSIUM CHLORIDE 20 MEQ: 20 TABLET, EXTENDED RELEASE ORAL at 20:43

## 2021-01-01 RX ADMIN — INSULIN LISPRO 1 UNITS: 100 INJECTION, SOLUTION INTRAVENOUS; SUBCUTANEOUS at 17:40

## 2021-01-01 RX ADMIN — VANCOMYCIN HYDROCHLORIDE 1500 MG: 1 INJECTION, POWDER, LYOPHILIZED, FOR SOLUTION INTRAVENOUS at 23:23

## 2021-01-01 RX ADMIN — ACETAMINOPHEN 975 MG: 325 TABLET, FILM COATED ORAL at 13:28

## 2021-01-01 RX ADMIN — Medication 10 MG/HR: at 23:56

## 2021-01-01 RX ADMIN — ACETAMINOPHEN 650 MG: 325 TABLET, FILM COATED ORAL at 00:04

## 2021-01-01 RX ADMIN — CARVEDILOL 6.25 MG: 3.12 TABLET, FILM COATED ORAL at 17:14

## 2021-01-01 RX ADMIN — PANTOPRAZOLE SODIUM 40 MG: 40 TABLET, DELAYED RELEASE ORAL at 06:24

## 2021-01-01 RX ADMIN — HEPARIN SODIUM 7500 UNITS: 5000 INJECTION INTRAVENOUS; SUBCUTANEOUS at 22:05

## 2021-01-01 RX ADMIN — HYDROMORPHONE HYDROCHLORIDE 0.5 MG: 1 INJECTION, SOLUTION INTRAMUSCULAR; INTRAVENOUS; SUBCUTANEOUS at 22:02

## 2021-01-01 RX ADMIN — DOCUSATE SODIUM 100 MG: 100 CAPSULE, LIQUID FILLED ORAL at 09:29

## 2021-01-01 RX ADMIN — FUROSEMIDE 80 MG: 80 TABLET ORAL at 08:39

## 2021-01-01 RX ADMIN — OXYCODONE HYDROCHLORIDE 5 MG: 5 TABLET ORAL at 13:02

## 2021-01-01 RX ADMIN — POTASSIUM CHLORIDE 40 MEQ: 1500 TABLET, EXTENDED RELEASE ORAL at 17:14

## 2021-01-01 RX ADMIN — CARVEDILOL 6.25 MG: 3.12 TABLET, FILM COATED ORAL at 16:06

## 2021-01-01 RX ADMIN — HYDROMORPHONE HYDROCHLORIDE 0.5 MG: 1 INJECTION, SOLUTION INTRAMUSCULAR; INTRAVENOUS; SUBCUTANEOUS at 15:07

## 2021-01-01 RX ADMIN — HEPARIN SODIUM 7500 UNITS: 5000 INJECTION INTRAVENOUS; SUBCUTANEOUS at 12:45

## 2021-01-01 RX ADMIN — OCTREOTIDE ACETATE 50 MCG: 100 INJECTION, SOLUTION INTRAVENOUS; SUBCUTANEOUS at 22:15

## 2021-01-01 RX ADMIN — PANTOPRAZOLE SODIUM 40 MG: 40 TABLET, DELAYED RELEASE ORAL at 05:23

## 2021-01-01 RX ADMIN — CARVEDILOL 6.25 MG: 3.12 TABLET, FILM COATED ORAL at 08:33

## 2021-01-01 RX ADMIN — ACETAMINOPHEN 650 MG: 325 TABLET, FILM COATED ORAL at 05:14

## 2021-01-01 RX ADMIN — POLYETHYLENE GLYCOL 3350 17 G: 17 POWDER, FOR SOLUTION ORAL at 08:48

## 2021-01-01 RX ADMIN — POTASSIUM CHLORIDE 20 MEQ: 1500 TABLET, EXTENDED RELEASE ORAL at 08:41

## 2021-01-01 RX ADMIN — POLYETHYLENE GLYCOL 3350 17 G: 17 POWDER, FOR SOLUTION ORAL at 02:09

## 2021-01-01 RX ADMIN — METOROPROLOL TARTRATE 2.5 MG: 5 INJECTION, SOLUTION INTRAVENOUS at 08:28

## 2021-01-01 RX ADMIN — POTASSIUM CHLORIDE 20 MEQ: 20 TABLET, EXTENDED RELEASE ORAL at 11:24

## 2021-01-01 RX ADMIN — OXYCODONE HYDROCHLORIDE 10 MG: 10 TABLET ORAL at 07:41

## 2021-01-01 RX ADMIN — FUROSEMIDE 40 MG: 40 TABLET ORAL at 08:15

## 2021-01-01 RX ADMIN — Medication 2000 UNITS: at 08:51

## 2021-01-01 RX ADMIN — HEPARIN SODIUM 7500 UNITS: 5000 INJECTION INTRAVENOUS; SUBCUTANEOUS at 21:04

## 2021-01-01 RX ADMIN — DOCUSATE SODIUM AND SENNOSIDES 1 TABLET: 8.6; 5 TABLET ORAL at 21:56

## 2021-01-01 RX ADMIN — FUROSEMIDE 40 MG: 40 TABLET ORAL at 21:04

## 2021-01-01 RX ADMIN — CARVEDILOL 6.25 MG: 3.12 TABLET, FILM COATED ORAL at 08:19

## 2021-01-01 RX ADMIN — CEFEPIME HYDROCHLORIDE 2000 MG: 2 INJECTION, SOLUTION INTRAVENOUS at 09:00

## 2021-01-01 RX ADMIN — PANTOPRAZOLE SODIUM 40 MG: 40 TABLET, DELAYED RELEASE ORAL at 05:30

## 2021-01-01 RX ADMIN — Medication 10 MG: at 22:21

## 2021-01-01 RX ADMIN — MIDODRINE HYDROCHLORIDE 15 MG: 5 TABLET ORAL at 19:40

## 2021-01-01 RX ADMIN — Medication 4000 UNITS: at 13:09

## 2021-01-01 RX ADMIN — PROPOFOL 25 MCG/KG/MIN: 10 INJECTION, EMULSION INTRAVENOUS at 22:32

## 2021-01-01 RX ADMIN — DOCUSATE SODIUM AND SENNOSIDES 1 TABLET: 8.6; 5 TABLET ORAL at 08:59

## 2021-01-01 RX ADMIN — CALCIUM GLUCONATE 2 G: 20 INJECTION, SOLUTION INTRAVENOUS at 21:16

## 2021-01-01 RX ADMIN — OXYCODONE HYDROCHLORIDE 5 MG: 5 TABLET ORAL at 23:11

## 2021-01-01 RX ADMIN — POLYETHYLENE GLYCOL 3350 17 G: 17 POWDER, FOR SOLUTION ORAL at 22:30

## 2021-01-01 RX ADMIN — DIGOXIN 250 MCG: 250 INJECTION, SOLUTION INTRAMUSCULAR; INTRAVENOUS; PARENTERAL at 01:34

## 2021-01-01 RX ADMIN — CARVEDILOL 6.25 MG: 3.12 TABLET, FILM COATED ORAL at 17:26

## 2021-01-01 RX ADMIN — OXYCODONE HYDROCHLORIDE 5 MG: 5 TABLET ORAL at 18:45

## 2021-01-01 RX ADMIN — OXYCODONE HYDROCHLORIDE AND ACETAMINOPHEN 1 TABLET: 5; 325 TABLET ORAL at 03:03

## 2021-01-01 RX ADMIN — ALBUMIN (HUMAN) 25 G: 0.25 INJECTION, SOLUTION INTRAVENOUS at 15:14

## 2021-01-01 RX ADMIN — VANCOMYCIN HYDROCHLORIDE 1250 MG: 500 INJECTION, POWDER, LYOPHILIZED, FOR SOLUTION INTRAVENOUS at 11:23

## 2021-01-01 RX ADMIN — POTASSIUM CHLORIDE 20 MEQ: 14.9 INJECTION, SOLUTION INTRAVENOUS at 10:41

## 2021-01-01 RX ADMIN — OXYCODONE HYDROCHLORIDE 5 MG: 5 TABLET ORAL at 18:17

## 2021-01-01 RX ADMIN — POTASSIUM CHLORIDE 20 MEQ: 20 TABLET, EXTENDED RELEASE ORAL at 16:12

## 2021-01-01 RX ADMIN — SENNOSIDES AND DOCUSATE SODIUM 2 TABLET: 50; 8.6 TABLET ORAL at 17:38

## 2021-01-01 RX ADMIN — MIDODRINE HYDROCHLORIDE 5 MG: 5 TABLET ORAL at 14:39

## 2021-01-01 RX ADMIN — WARFARIN SODIUM 7 MG: 6 TABLET ORAL at 18:31

## 2021-01-01 RX ADMIN — OXYCODONE HYDROCHLORIDE 5 MG: 5 TABLET ORAL at 20:48

## 2021-01-01 RX ADMIN — POTASSIUM CHLORIDE 20 MEQ: 14.9 INJECTION, SOLUTION INTRAVENOUS at 11:59

## 2021-01-01 RX ADMIN — OXYCODONE HYDROCHLORIDE 10 MG: 10 TABLET ORAL at 04:59

## 2021-01-01 RX ADMIN — MIDODRINE HYDROCHLORIDE 5 MG: 5 TABLET ORAL at 12:05

## 2021-01-01 RX ADMIN — ACETAMINOPHEN 975 MG: 325 TABLET, FILM COATED ORAL at 21:13

## 2021-01-01 RX ADMIN — SODIUM CHLORIDE, SODIUM GLUCONATE, SODIUM ACETATE, POTASSIUM CHLORIDE, MAGNESIUM CHLORIDE, SODIUM PHOSPHATE, DIBASIC, AND POTASSIUM PHOSPHATE 1000 ML: .53; .5; .37; .037; .03; .012; .00082 INJECTION, SOLUTION INTRAVENOUS at 21:53

## 2021-01-01 RX ADMIN — PANTOPRAZOLE SODIUM 40 MG: 40 TABLET, DELAYED RELEASE ORAL at 05:12

## 2021-01-01 RX ADMIN — OXYCODONE HYDROCHLORIDE 5 MG: 5 TABLET ORAL at 06:36

## 2021-01-01 RX ADMIN — OXYCODONE HYDROCHLORIDE 5 MG: 5 TABLET ORAL at 17:33

## 2021-01-01 RX ADMIN — MIDODRINE HYDROCHLORIDE 10 MG: 5 TABLET ORAL at 06:13

## 2021-01-01 RX ADMIN — ACETAMINOPHEN 975 MG: 325 TABLET, FILM COATED ORAL at 05:34

## 2021-01-01 RX ADMIN — ACETAMINOPHEN 650 MG: 325 TABLET, FILM COATED ORAL at 11:02

## 2021-01-01 RX ADMIN — MIDODRINE HYDROCHLORIDE 10 MG: 5 TABLET ORAL at 16:55

## 2021-01-01 RX ADMIN — OXYCODONE HYDROCHLORIDE 5 MG: 5 TABLET ORAL at 23:58

## 2021-01-01 RX ADMIN — HEPARIN SODIUM 7500 UNITS: 5000 INJECTION INTRAVENOUS; SUBCUTANEOUS at 21:51

## 2021-01-01 RX ADMIN — OXYCODONE HYDROCHLORIDE 5 MG: 5 TABLET ORAL at 18:08

## 2021-01-01 RX ADMIN — PROPOFOL 170 MG: 10 INJECTION, EMULSION INTRAVENOUS at 23:06

## 2021-01-01 RX ADMIN — MAGNESIUM HYDROXIDE 30 ML: 400 SUSPENSION ORAL at 22:15

## 2021-01-01 RX ADMIN — DIGOXIN 250 MCG: 250 TABLET ORAL at 08:00

## 2021-01-01 RX ADMIN — POTASSIUM CHLORIDE 20 MEQ: 1500 TABLET, EXTENDED RELEASE ORAL at 17:13

## 2021-01-01 RX ADMIN — METOPROLOL TARTRATE 50 MG: 50 TABLET, FILM COATED ORAL at 20:47

## 2021-01-01 RX ADMIN — POTASSIUM CHLORIDE 20 MEQ: 1500 TABLET, EXTENDED RELEASE ORAL at 08:59

## 2021-01-01 RX ADMIN — OXYCODONE HYDROCHLORIDE 5 MG: 5 TABLET ORAL at 07:00

## 2021-01-01 RX ADMIN — PANTOPRAZOLE SODIUM 40 MG: 40 TABLET, DELAYED RELEASE ORAL at 05:08

## 2021-01-01 RX ADMIN — POTASSIUM CHLORIDE 40 MEQ: 1500 TABLET, EXTENDED RELEASE ORAL at 17:52

## 2021-01-01 RX ADMIN — FUROSEMIDE 20 MG: 20 TABLET ORAL at 08:59

## 2021-01-01 RX ADMIN — PHENYLEPHRINE HYDROCHLORIDE 20 MCG/MIN: 10 INJECTION INTRAVENOUS at 22:52

## 2021-01-01 RX ADMIN — CALCIUM CARBONATE (ANTACID) CHEW TAB 500 MG 500 MG: 500 CHEW TAB at 05:42

## 2021-01-01 RX ADMIN — ALBUMIN (HUMAN) 25 G: 0.25 INJECTION, SOLUTION INTRAVENOUS at 03:19

## 2021-01-01 RX ADMIN — HYDROMORPHONE HYDROCHLORIDE 0.2 MG: 0.2 INJECTION, SOLUTION INTRAMUSCULAR; INTRAVENOUS; SUBCUTANEOUS at 23:08

## 2021-01-01 RX ADMIN — MIDODRINE HYDROCHLORIDE 15 MG: 5 TABLET ORAL at 19:22

## 2021-01-01 RX ADMIN — DOCUSATE SODIUM 100 MG: 100 CAPSULE, LIQUID FILLED ORAL at 08:25

## 2021-01-01 RX ADMIN — HYDROMORPHONE HYDROCHLORIDE 0.5 MG: 1 INJECTION, SOLUTION INTRAMUSCULAR; INTRAVENOUS; SUBCUTANEOUS at 05:13

## 2021-01-01 RX ADMIN — PANTOPRAZOLE SODIUM 40 MG: 40 TABLET, DELAYED RELEASE ORAL at 06:08

## 2021-01-01 RX ADMIN — INSULIN LISPRO 1 UNITS: 100 INJECTION, SOLUTION INTRAVENOUS; SUBCUTANEOUS at 22:54

## 2021-01-01 RX ADMIN — OXYCODONE HYDROCHLORIDE 5 MG: 5 TABLET ORAL at 03:39

## 2021-01-01 RX ADMIN — SODIUM CHLORIDE, SODIUM LACTATE, POTASSIUM CHLORIDE, AND CALCIUM CHLORIDE 1000 ML: .6; .31; .03; .02 INJECTION, SOLUTION INTRAVENOUS at 12:57

## 2021-01-01 RX ADMIN — ALLOPURINOL 100 MG: 100 TABLET ORAL at 17:37

## 2021-01-01 RX ADMIN — PANTOPRAZOLE SODIUM 40 MG: 40 TABLET, DELAYED RELEASE ORAL at 06:01

## 2021-01-01 RX ADMIN — INSULIN LISPRO 2 UNITS: 100 INJECTION, SOLUTION INTRAVENOUS; SUBCUTANEOUS at 11:49

## 2021-01-01 RX ADMIN — POLYETHYLENE GLYCOL 3350 17 G: 17 POWDER, FOR SOLUTION ORAL at 09:30

## 2021-01-01 RX ADMIN — LIDOCAINE HYDROCHLORIDE 0.5 ML: 10 INJECTION, SOLUTION EPIDURAL; INFILTRATION; INTRACAUDAL; PERINEURAL at 22:16

## 2021-01-01 RX ADMIN — OXYCODONE HYDROCHLORIDE AND ACETAMINOPHEN 1 TABLET: 5; 325 TABLET ORAL at 10:16

## 2021-01-01 RX ADMIN — POTASSIUM CHLORIDE 20 MEQ: 1500 TABLET, EXTENDED RELEASE ORAL at 17:16

## 2021-01-01 RX ADMIN — Medication 7.5 MG: at 16:48

## 2021-01-01 RX ADMIN — METOPROLOL SUCCINATE 25 MG: 25 TABLET, FILM COATED, EXTENDED RELEASE ORAL at 08:48

## 2021-01-01 RX ADMIN — MIDODRINE HYDROCHLORIDE 10 MG: 5 TABLET ORAL at 16:51

## 2021-01-01 RX ADMIN — OXYCODONE HYDROCHLORIDE 10 MG: 10 TABLET ORAL at 03:42

## 2021-01-01 RX ADMIN — DIGOXIN 250 MCG: 250 INJECTION, SOLUTION INTRAMUSCULAR; INTRAVENOUS; PARENTERAL at 11:30

## 2021-01-01 RX ADMIN — BISACODYL 10 MG: 5 TABLET, COATED ORAL at 17:16

## 2021-01-01 RX ADMIN — ACETAMINOPHEN 975 MG: 325 TABLET, FILM COATED ORAL at 05:56

## 2021-01-01 RX ADMIN — ACETAMINOPHEN 975 MG: 325 TABLET, FILM COATED ORAL at 13:24

## 2021-01-01 RX ADMIN — MIDODRINE HYDROCHLORIDE 10 MG: 5 TABLET ORAL at 04:24

## 2021-01-01 RX ADMIN — NEOSTIGMINE METHYLSULFATE 3 MG: 1 INJECTION INTRAVENOUS at 00:50

## 2021-01-01 RX ADMIN — HYDROMORPHONE HYDROCHLORIDE 0.5 MG: 1 INJECTION, SOLUTION INTRAMUSCULAR; INTRAVENOUS; SUBCUTANEOUS at 04:35

## 2021-01-01 RX ADMIN — CARVEDILOL 6.25 MG: 3.12 TABLET, FILM COATED ORAL at 16:12

## 2021-01-01 RX ADMIN — HYDROMORPHONE HYDROCHLORIDE 0.5 MG: 1 INJECTION, SOLUTION INTRAMUSCULAR; INTRAVENOUS; SUBCUTANEOUS at 20:11

## 2021-01-01 RX ADMIN — ALBUMIN (HUMAN) 25 G: 0.25 INJECTION, SOLUTION INTRAVENOUS at 03:42

## 2021-01-01 RX ADMIN — BUMETANIDE 1 MG: 1 TABLET ORAL at 17:31

## 2021-01-01 RX ADMIN — ACETAMINOPHEN 650 MG: 325 TABLET, FILM COATED ORAL at 05:10

## 2021-01-01 RX ADMIN — IODIXANOL 90 ML: 320 INJECTION, SOLUTION INTRAVASCULAR at 01:16

## 2021-01-01 RX ADMIN — OXYCODONE HYDROCHLORIDE AND ACETAMINOPHEN 1 TABLET: 5; 325 TABLET ORAL at 16:28

## 2021-01-01 RX ADMIN — INSULIN LISPRO 2 UNITS: 100 INJECTION, SOLUTION INTRAVENOUS; SUBCUTANEOUS at 08:22

## 2021-01-01 RX ADMIN — OXYCODONE HYDROCHLORIDE AND ACETAMINOPHEN 1 TABLET: 5; 325 TABLET ORAL at 15:11

## 2021-01-01 RX ADMIN — METOPROLOL TARTRATE 50 MG: 50 TABLET, FILM COATED ORAL at 08:02

## 2021-01-01 RX ADMIN — DOCUSATE SODIUM 100 MG: 100 CAPSULE, LIQUID FILLED ORAL at 18:24

## 2021-01-01 RX ADMIN — CARVEDILOL 12.5 MG: 12.5 TABLET, FILM COATED ORAL at 18:08

## 2021-01-01 RX ADMIN — MIDODRINE HYDROCHLORIDE 10 MG: 5 TABLET ORAL at 12:23

## 2021-01-01 RX ADMIN — OXYCODONE HYDROCHLORIDE 10 MG: 10 TABLET ORAL at 08:12

## 2021-01-01 RX ADMIN — MAGNESIUM SULFATE HEPTAHYDRATE 2 G: 40 INJECTION, SOLUTION INTRAVENOUS at 09:19

## 2021-01-01 RX ADMIN — FUROSEMIDE 40 MG: 10 INJECTION, SOLUTION INTRAVENOUS at 12:51

## 2021-01-01 RX ADMIN — HEPARIN SODIUM 7500 UNITS: 5000 INJECTION INTRAVENOUS; SUBCUTANEOUS at 13:28

## 2021-01-01 RX ADMIN — POTASSIUM CHLORIDE 20 MEQ: 1500 TABLET, EXTENDED RELEASE ORAL at 12:07

## 2021-01-01 RX ADMIN — OXYCODONE HYDROCHLORIDE 5 MG: 5 TABLET ORAL at 16:30

## 2021-01-01 RX ADMIN — MIDODRINE HYDROCHLORIDE 7.5 MG: 5 TABLET ORAL at 16:50

## 2021-01-01 RX ADMIN — METOPROLOL TARTRATE 50 MG: 50 TABLET, FILM COATED ORAL at 20:31

## 2021-01-01 RX ADMIN — HEPARIN SODIUM 7500 UNITS: 5000 INJECTION INTRAVENOUS; SUBCUTANEOUS at 05:32

## 2021-01-01 RX ADMIN — BISACODYL 10 MG: 5 TABLET, COATED ORAL at 17:10

## 2021-01-01 RX ADMIN — CALCIUM GLUCONATE 2 G: 20 INJECTION, SOLUTION INTRAVENOUS at 12:12

## 2021-01-01 RX ADMIN — OXYCODONE HYDROCHLORIDE 5 MG: 5 TABLET ORAL at 18:09

## 2021-01-01 RX ADMIN — CEFAZOLIN SODIUM 2000 MG: 2 SOLUTION INTRAVENOUS at 22:45

## 2021-01-01 RX ADMIN — PANTOPRAZOLE SODIUM 40 MG: 40 TABLET, DELAYED RELEASE ORAL at 05:13

## 2021-01-01 RX ADMIN — METOPROLOL TARTRATE 25 MG: 25 TABLET, FILM COATED ORAL at 09:11

## 2021-01-01 RX ADMIN — FENTANYL CITRATE 50 MCG: 50 INJECTION INTRAMUSCULAR; INTRAVENOUS at 12:59

## 2021-01-01 RX ADMIN — MIDODRINE HYDROCHLORIDE 7.5 MG: 5 TABLET ORAL at 09:01

## 2021-01-01 RX ADMIN — POTASSIUM CHLORIDE 20 MEQ: 1500 TABLET, EXTENDED RELEASE ORAL at 08:25

## 2021-01-01 RX ADMIN — CEFEPIME HYDROCHLORIDE 1000 MG: 1 INJECTION, SOLUTION INTRAVENOUS at 09:12

## 2021-01-01 RX ADMIN — INSULIN LISPRO 2 UNITS: 100 INJECTION, SOLUTION INTRAVENOUS; SUBCUTANEOUS at 11:58

## 2021-01-01 RX ADMIN — MIDODRINE HYDROCHLORIDE 10 MG: 5 TABLET ORAL at 16:15

## 2021-01-01 RX ADMIN — OXYCODONE HYDROCHLORIDE 5 MG: 5 TABLET ORAL at 06:08

## 2021-01-01 RX ADMIN — ALTEPLASE 10 MG: 2.2 INJECTION, POWDER, LYOPHILIZED, FOR SOLUTION INTRAVENOUS at 13:02

## 2021-01-01 RX ADMIN — ERGOCALCIFEROL 50000 UNITS: 1.25 CAPSULE ORAL at 08:27

## 2021-01-01 RX ADMIN — OXYCODONE HYDROCHLORIDE 5 MG: 5 TABLET ORAL at 17:18

## 2021-01-01 RX ADMIN — CEFEPIME HYDROCHLORIDE 1000 MG: 1 INJECTION, SOLUTION INTRAVENOUS at 08:33

## 2021-01-01 RX ADMIN — ACETAMINOPHEN 975 MG: 325 TABLET, FILM COATED ORAL at 22:00

## 2021-01-01 RX ADMIN — ACETAMINOPHEN 650 MG: 325 TABLET, FILM COATED ORAL at 05:54

## 2021-01-01 RX ADMIN — MIDODRINE HYDROCHLORIDE 10 MG: 5 TABLET ORAL at 11:55

## 2021-01-01 RX ADMIN — POTASSIUM CHLORIDE 20 MEQ: 14.9 INJECTION, SOLUTION INTRAVENOUS at 09:57

## 2021-01-01 RX ADMIN — BUMETANIDE 1 MG: 0.25 INJECTION, SOLUTION INTRAMUSCULAR; INTRAVENOUS at 09:17

## 2021-01-01 RX ADMIN — HYDROMORPHONE HYDROCHLORIDE 0.2 MG: 0.2 INJECTION, SOLUTION INTRAMUSCULAR; INTRAVENOUS; SUBCUTANEOUS at 20:17

## 2021-01-01 RX ADMIN — DOCUSATE SODIUM 100 MG: 100 CAPSULE, LIQUID FILLED ORAL at 17:55

## 2021-01-01 RX ADMIN — POTASSIUM CHLORIDE 20 MEQ: 1500 TABLET, EXTENDED RELEASE ORAL at 12:03

## 2021-01-01 RX ADMIN — FUROSEMIDE 40 MG: 10 INJECTION, SOLUTION INTRAVENOUS at 11:44

## 2021-01-01 RX ADMIN — MIDODRINE HYDROCHLORIDE 7.5 MG: 5 TABLET ORAL at 06:04

## 2021-01-01 RX ADMIN — CARVEDILOL 6.25 MG: 6.25 TABLET, FILM COATED ORAL at 11:05

## 2021-01-01 RX ADMIN — COLCHICINE 0.6 MG: 0.6 TABLET ORAL at 08:58

## 2021-01-01 RX ADMIN — OXYCODONE HYDROCHLORIDE 5 MG: 5 TABLET ORAL at 11:18

## 2021-01-01 RX ADMIN — ALBUMIN (HUMAN) 25 G: 0.25 INJECTION, SOLUTION INTRAVENOUS at 12:28

## 2021-01-01 RX ADMIN — MIDAZOLAM 1 MG: 1 INJECTION INTRAMUSCULAR; INTRAVENOUS at 22:52

## 2021-01-01 RX ADMIN — MIDODRINE HYDROCHLORIDE 10 MG: 5 TABLET ORAL at 12:01

## 2021-01-01 RX ADMIN — METOPROLOL TARTRATE 37.5 MG: 25 TABLET, FILM COATED ORAL at 10:28

## 2021-01-01 RX ADMIN — POTASSIUM CHLORIDE 20 MEQ: 1500 TABLET, EXTENDED RELEASE ORAL at 17:38

## 2021-01-01 RX ADMIN — OXYCODONE HYDROCHLORIDE 5 MG: 5 TABLET ORAL at 00:04

## 2021-01-01 RX ADMIN — POTASSIUM CHLORIDE 20 MEQ: 14.9 INJECTION, SOLUTION INTRAVENOUS at 20:31

## 2021-01-01 RX ADMIN — MIDODRINE HYDROCHLORIDE 10 MG: 5 TABLET ORAL at 11:53

## 2021-01-01 RX ADMIN — DEXAMETHASONE SODIUM PHOSPHATE 10 MG: 4 INJECTION INTRA-ARTICULAR; INTRALESIONAL; INTRAMUSCULAR; INTRAVENOUS; SOFT TISSUE at 14:37

## 2021-01-01 RX ADMIN — Medication 2000 UNITS: at 08:29

## 2021-01-01 RX ADMIN — WARFARIN SODIUM 5 MG: 5 TABLET ORAL at 18:02

## 2021-01-01 RX ADMIN — CARVEDILOL 6.25 MG: 3.12 TABLET, FILM COATED ORAL at 08:30

## 2021-01-01 RX ADMIN — Medication 10 MG/HR: at 11:20

## 2021-01-01 RX ADMIN — MIDODRINE HYDROCHLORIDE 15 MG: 5 TABLET ORAL at 03:20

## 2021-01-01 RX ADMIN — MIDODRINE HYDROCHLORIDE 5 MG: 5 TABLET ORAL at 18:45

## 2021-01-01 RX ADMIN — WARFARIN SODIUM 5 MG: 5 TABLET ORAL at 17:16

## 2021-01-01 RX ADMIN — MIDODRINE HYDROCHLORIDE 10 MG: 5 TABLET ORAL at 06:06

## 2021-01-01 RX ADMIN — HYDROMORPHONE HYDROCHLORIDE 0.5 MG: 1 INJECTION, SOLUTION INTRAMUSCULAR; INTRAVENOUS; SUBCUTANEOUS at 14:51

## 2021-01-01 RX ADMIN — SODIUM CHLORIDE, SODIUM LACTATE, POTASSIUM CHLORIDE, AND CALCIUM CHLORIDE 1000 ML: .6; .31; .03; .02 INJECTION, SOLUTION INTRAVENOUS at 11:48

## 2021-01-01 RX ADMIN — ALBUMIN (HUMAN) 25 G: 12.5 INJECTION, SOLUTION INTRAVENOUS at 11:05

## 2021-01-01 RX ADMIN — CARVEDILOL 6.25 MG: 3.12 TABLET, FILM COATED ORAL at 08:23

## 2021-01-01 RX ADMIN — POTASSIUM CHLORIDE 20 MEQ: 1500 TABLET, EXTENDED RELEASE ORAL at 17:27

## 2021-01-01 RX ADMIN — ACETAZOLAMIDE 250 MG: 250 TABLET ORAL at 08:12

## 2021-01-01 RX ADMIN — DOCUSATE SODIUM 100 MG: 100 CAPSULE, LIQUID FILLED ORAL at 08:52

## 2021-01-01 RX ADMIN — INSULIN LISPRO 1 UNITS: 100 INJECTION, SOLUTION INTRAVENOUS; SUBCUTANEOUS at 12:06

## 2021-01-01 RX ADMIN — HYDROMORPHONE HYDROCHLORIDE 0.5 MG: 1 INJECTION, SOLUTION INTRAMUSCULAR; INTRAVENOUS; SUBCUTANEOUS at 17:52

## 2021-01-01 RX ADMIN — POLYETHYLENE GLYCOL 3350 17 G: 17 POWDER, FOR SOLUTION ORAL at 21:48

## 2021-01-01 RX ADMIN — ALBUMIN (HUMAN) 25 G: 0.25 INJECTION, SOLUTION INTRAVENOUS at 20:14

## 2021-01-01 RX ADMIN — IOHEXOL 100 ML: 350 INJECTION, SOLUTION INTRAVENOUS at 12:55

## 2021-01-01 RX ADMIN — VANCOMYCIN HYDROCHLORIDE 1500 MG: 1 INJECTION, POWDER, LYOPHILIZED, FOR SOLUTION INTRAVENOUS at 11:32

## 2021-01-01 RX ADMIN — ACETAMINOPHEN 650 MG: 325 TABLET, FILM COATED ORAL at 23:50

## 2021-01-01 RX ADMIN — OXYCODONE HYDROCHLORIDE 10 MG: 10 TABLET ORAL at 01:11

## 2021-01-01 RX ADMIN — WARFARIN SODIUM 5 MG: 5 TABLET ORAL at 17:20

## 2021-01-01 RX ADMIN — GABAPENTIN 300 MG: 300 CAPSULE ORAL at 16:25

## 2021-01-01 RX ADMIN — SENNOSIDES AND DOCUSATE SODIUM 2 TABLET: 50; 8.6 TABLET ORAL at 17:12

## 2021-01-01 RX ADMIN — DIGOXIN 250 MCG: 250 TABLET ORAL at 08:20

## 2021-01-01 RX ADMIN — CARVEDILOL 6.25 MG: 6.25 TABLET, FILM COATED ORAL at 10:18

## 2021-01-01 RX ADMIN — POTASSIUM CHLORIDE 40 MEQ: 1500 TABLET, EXTENDED RELEASE ORAL at 08:53

## 2021-01-01 RX ADMIN — OXYCODONE HYDROCHLORIDE 10 MG: 10 TABLET ORAL at 06:04

## 2021-01-01 RX ADMIN — Medication 10 MG/HR: at 17:09

## 2021-01-01 RX ADMIN — OXYCODONE HYDROCHLORIDE 5 MG: 5 TABLET ORAL at 02:09

## 2021-01-01 RX ADMIN — OXYCODONE HYDROCHLORIDE 5 MG: 5 TABLET ORAL at 08:27

## 2021-01-01 RX ADMIN — NOREPINEPHRINE BITARTRATE 1 MCG/MIN: 1 INJECTION, SOLUTION, CONCENTRATE INTRAVENOUS at 17:55

## 2021-01-01 RX ADMIN — Medication 1 PATCH: at 09:47

## 2021-01-01 RX ADMIN — POTASSIUM CHLORIDE 20 MEQ: 1500 TABLET, EXTENDED RELEASE ORAL at 09:00

## 2021-01-01 RX ADMIN — ACETAMINOPHEN 650 MG: 325 TABLET, FILM COATED ORAL at 17:37

## 2021-01-01 RX ADMIN — SENNOSIDES AND DOCUSATE SODIUM 2 TABLET: 50; 8.6 TABLET ORAL at 17:27

## 2021-01-01 RX ADMIN — ROCURONIUM BROMIDE 20 MG: 50 INJECTION, SOLUTION INTRAVENOUS at 13:58

## 2021-01-01 RX ADMIN — ACETAMINOPHEN 975 MG: 325 TABLET, FILM COATED ORAL at 05:09

## 2021-01-01 RX ADMIN — FUROSEMIDE 120 MG: 40 TABLET ORAL at 16:08

## 2021-01-01 RX ADMIN — Medication 10 MG/HR: at 17:41

## 2021-01-01 RX ADMIN — POTASSIUM CHLORIDE 20 MEQ: 1500 TABLET, EXTENDED RELEASE ORAL at 10:35

## 2021-01-01 RX ADMIN — POTASSIUM CHLORIDE 40 MEQ: 1500 TABLET, EXTENDED RELEASE ORAL at 08:20

## 2021-01-01 RX ADMIN — METOPROLOL SUCCINATE 25 MG: 25 TABLET, FILM COATED, EXTENDED RELEASE ORAL at 21:31

## 2021-01-01 RX ADMIN — ACETAMINOPHEN 650 MG: 325 TABLET, FILM COATED ORAL at 20:00

## 2021-01-01 RX ADMIN — GABAPENTIN 300 MG: 300 CAPSULE ORAL at 22:14

## 2021-01-01 RX ADMIN — DOCUSATE SODIUM 100 MG: 100 CAPSULE, LIQUID FILLED ORAL at 09:11

## 2021-01-01 RX ADMIN — SENNOSIDES AND DOCUSATE SODIUM 2 TABLET: 50; 8.6 TABLET ORAL at 09:00

## 2021-01-01 RX ADMIN — HYDROMORPHONE HYDROCHLORIDE 0.5 MG: 1 INJECTION, SOLUTION INTRAMUSCULAR; INTRAVENOUS; SUBCUTANEOUS at 13:20

## 2021-01-01 RX ADMIN — WARFARIN SODIUM 5 MG: 5 TABLET ORAL at 17:37

## 2021-01-01 RX ADMIN — MIDODRINE HYDROCHLORIDE 7.5 MG: 5 TABLET ORAL at 07:39

## 2021-01-01 RX ADMIN — WARFARIN SODIUM 5 MG: 5 TABLET ORAL at 17:27

## 2021-01-01 RX ADMIN — POTASSIUM CHLORIDE 40 MEQ: 20 TABLET, EXTENDED RELEASE ORAL at 16:36

## 2021-01-01 RX ADMIN — MIDODRINE HYDROCHLORIDE 10 MG: 5 TABLET ORAL at 10:31

## 2021-01-01 RX ADMIN — OXYCODONE HYDROCHLORIDE AND ACETAMINOPHEN 1 TABLET: 5; 325 TABLET ORAL at 22:21

## 2021-01-01 RX ADMIN — METOPROLOL TARTRATE 50 MG: 50 TABLET, FILM COATED ORAL at 20:51

## 2021-01-01 RX ADMIN — POTASSIUM CHLORIDE 20 MEQ: 14.9 INJECTION, SOLUTION INTRAVENOUS at 13:09

## 2021-01-01 RX ADMIN — METOPROLOL TARTRATE 25 MG: 25 TABLET, FILM COATED ORAL at 08:31

## 2021-01-01 RX ADMIN — MIDODRINE HYDROCHLORIDE 7.5 MG: 5 TABLET ORAL at 06:28

## 2021-01-01 RX ADMIN — ALTEPLASE 10 MG: 2.2 INJECTION, POWDER, LYOPHILIZED, FOR SOLUTION INTRAVENOUS at 09:20

## 2021-01-01 RX ADMIN — POTASSIUM CHLORIDE 20 MEQ: 14.9 INJECTION, SOLUTION INTRAVENOUS at 05:33

## 2021-01-01 RX ADMIN — ACETAMINOPHEN 975 MG: 325 TABLET, FILM COATED ORAL at 05:33

## 2021-01-01 RX ADMIN — DIGOXIN 250 MCG: 250 TABLET ORAL at 08:27

## 2021-01-01 RX ADMIN — GABAPENTIN 300 MG: 300 CAPSULE ORAL at 08:58

## 2021-01-01 RX ADMIN — OXYCODONE HYDROCHLORIDE 5 MG: 5 TABLET ORAL at 16:52

## 2021-01-01 RX ADMIN — OXYCODONE HYDROCHLORIDE 10 MG: 10 TABLET ORAL at 08:24

## 2021-01-01 RX ADMIN — OXYCODONE HYDROCHLORIDE 5 MG: 5 TABLET ORAL at 17:37

## 2021-01-01 RX ADMIN — POTASSIUM CHLORIDE 40 MEQ: 20 TABLET, EXTENDED RELEASE ORAL at 17:16

## 2021-01-01 RX ADMIN — DOCUSATE SODIUM 100 MG: 100 CAPSULE, LIQUID FILLED ORAL at 17:42

## 2021-01-01 RX ADMIN — POTASSIUM CHLORIDE 20 MEQ: 20 TABLET, EXTENDED RELEASE ORAL at 11:07

## 2021-01-01 RX ADMIN — POTASSIUM CHLORIDE 20 MEQ: 14.9 INJECTION, SOLUTION INTRAVENOUS at 03:25

## 2021-01-01 RX ADMIN — ACETAMINOPHEN 650 MG: 325 TABLET, FILM COATED ORAL at 18:08

## 2021-01-01 RX ADMIN — Medication 10 MG: at 22:31

## 2021-01-01 RX ADMIN — Medication 200 MG: at 23:06

## 2021-01-01 RX ADMIN — MIDODRINE HYDROCHLORIDE 5 MG: 5 TABLET ORAL at 17:59

## 2021-01-01 RX ADMIN — BISACODYL 10 MG: 5 TABLET, COATED ORAL at 17:14

## 2021-01-01 RX ADMIN — POTASSIUM CHLORIDE 20 MEQ: 1500 TABLET, EXTENDED RELEASE ORAL at 21:10

## 2021-01-01 RX ADMIN — WARFARIN SODIUM 5 MG: 5 TABLET ORAL at 18:00

## 2021-01-01 RX ADMIN — INSULIN LISPRO 1 UNITS: 100 INJECTION, SOLUTION INTRAVENOUS; SUBCUTANEOUS at 21:52

## 2021-01-01 RX ADMIN — LIDOCAINE HYDROCHLORIDE 50 MG: 10 INJECTION, SOLUTION EPIDURAL; INFILTRATION; INTRACAUDAL; PERINEURAL at 13:36

## 2021-01-01 RX ADMIN — POLYETHYLENE GLYCOL 3350 17 G: 17 POWDER, FOR SOLUTION ORAL at 09:17

## 2021-01-01 RX ADMIN — POTASSIUM CHLORIDE 20 MEQ: 20 TABLET, EXTENDED RELEASE ORAL at 16:28

## 2021-01-01 RX ADMIN — SODIUM CHLORIDE 20 ML/HR: 0.9 INJECTION, SOLUTION INTRAVENOUS at 11:52

## 2021-01-01 RX ADMIN — INSULIN LISPRO 1 UNITS: 100 INJECTION, SOLUTION INTRAVENOUS; SUBCUTANEOUS at 11:38

## 2021-01-01 RX ADMIN — Medication 10 MG/HR: at 13:56

## 2021-01-01 RX ADMIN — POTASSIUM CHLORIDE 40 MEQ: 1500 TABLET, EXTENDED RELEASE ORAL at 08:32

## 2021-01-01 RX ADMIN — SODIUM CHLORIDE, SODIUM LACTATE, POTASSIUM CHLORIDE, AND CALCIUM CHLORIDE 1000 ML: .6; .31; .03; .02 INJECTION, SOLUTION INTRAVENOUS at 11:12

## 2021-01-01 RX ADMIN — HEPARIN SODIUM 7500 UNITS: 5000 INJECTION INTRAVENOUS; SUBCUTANEOUS at 05:56

## 2021-01-01 RX ADMIN — FUROSEMIDE 60 MG: 40 TABLET ORAL at 08:31

## 2021-01-01 RX ADMIN — MAGNESIUM SULFATE HEPTAHYDRATE 2 G: 40 INJECTION, SOLUTION INTRAVENOUS at 18:50

## 2021-01-01 RX ADMIN — CARVEDILOL 6.25 MG: 3.12 TABLET, FILM COATED ORAL at 17:35

## 2021-01-01 RX ADMIN — SENNOSIDES AND DOCUSATE SODIUM 2 TABLET: 50; 8.6 TABLET ORAL at 07:42

## 2021-01-01 RX ADMIN — ACETAMINOPHEN 650 MG: 325 TABLET, FILM COATED ORAL at 05:21

## 2021-01-01 RX ADMIN — POTASSIUM CHLORIDE 40 MEQ: 1500 TABLET, EXTENDED RELEASE ORAL at 21:42

## 2021-01-01 RX ADMIN — OXYCODONE HYDROCHLORIDE AND ACETAMINOPHEN 1 TABLET: 5; 325 TABLET ORAL at 03:50

## 2021-01-01 RX ADMIN — SENNOSIDES AND DOCUSATE SODIUM 2 TABLET: 50; 8.6 TABLET ORAL at 09:58

## 2021-01-01 RX ADMIN — CEFAZOLIN SODIUM 2000 MG: 2 SOLUTION INTRAVENOUS at 10:18

## 2021-01-01 RX ADMIN — POLYETHYLENE GLYCOL 3350 17 G: 17 POWDER, FOR SOLUTION ORAL at 03:50

## 2021-01-01 RX ADMIN — ACETAMINOPHEN 975 MG: 325 TABLET, FILM COATED ORAL at 17:32

## 2021-01-01 RX ADMIN — MIDAZOLAM 1 MG: 1 INJECTION INTRAMUSCULAR; INTRAVENOUS at 22:13

## 2021-01-01 RX ADMIN — PROPOFOL 200 MG: 10 INJECTION, EMULSION INTRAVENOUS at 13:36

## 2021-01-01 RX ADMIN — Medication 20 MG/HR: at 23:12

## 2021-01-01 RX ADMIN — POTASSIUM CHLORIDE 20 MEQ: 1500 TABLET, EXTENDED RELEASE ORAL at 21:57

## 2021-01-01 RX ADMIN — ALBUMIN (HUMAN) 12.5 G: 12.5 INJECTION, SOLUTION INTRAVENOUS at 17:30

## 2021-01-01 RX ADMIN — POTASSIUM CHLORIDE 20 MEQ: 1500 TABLET, EXTENDED RELEASE ORAL at 08:20

## 2021-01-01 RX ADMIN — MIDODRINE HYDROCHLORIDE 10 MG: 5 TABLET ORAL at 17:28

## 2021-01-01 RX ADMIN — HEPARIN SODIUM 7500 UNITS: 5000 INJECTION INTRAVENOUS; SUBCUTANEOUS at 14:01

## 2021-01-01 RX ADMIN — POTASSIUM CHLORIDE 20 MEQ: 1500 TABLET, EXTENDED RELEASE ORAL at 08:52

## 2021-01-01 RX ADMIN — METOPROLOL TARTRATE 50 MG: 50 TABLET, FILM COATED ORAL at 21:57

## 2021-01-01 RX ADMIN — ACETAMINOPHEN 975 MG: 325 TABLET, FILM COATED ORAL at 22:03

## 2021-01-01 RX ADMIN — MIDODRINE HYDROCHLORIDE 7.5 MG: 5 TABLET ORAL at 17:18

## 2021-01-01 RX ADMIN — EPHEDRINE SULFATE 15 MG: 50 INJECTION, SOLUTION INTRAVENOUS at 23:06

## 2021-01-01 RX ADMIN — HEPARIN SODIUM 7500 UNITS: 5000 INJECTION INTRAVENOUS; SUBCUTANEOUS at 15:30

## 2021-01-01 RX ADMIN — MIDODRINE HYDROCHLORIDE 5 MG: 5 TABLET ORAL at 16:49

## 2021-01-01 RX ADMIN — HYDROMORPHONE HYDROCHLORIDE 0.5 MG: 1 INJECTION, SOLUTION INTRAMUSCULAR; INTRAVENOUS; SUBCUTANEOUS at 05:03

## 2021-01-01 RX ADMIN — HEPARIN SODIUM 7500 UNITS: 5000 INJECTION INTRAVENOUS; SUBCUTANEOUS at 05:23

## 2021-01-01 RX ADMIN — OXYCODONE HYDROCHLORIDE 5 MG: 5 TABLET ORAL at 19:43

## 2021-01-01 RX ADMIN — INSULIN LISPRO 1 UNITS: 100 INJECTION, SOLUTION INTRAVENOUS; SUBCUTANEOUS at 10:39

## 2021-01-01 RX ADMIN — WARFARIN SODIUM 5 MG: 5 TABLET ORAL at 17:13

## 2021-01-01 RX ADMIN — INSULIN LISPRO 1 UNITS: 100 INJECTION, SOLUTION INTRAVENOUS; SUBCUTANEOUS at 22:00

## 2021-01-01 RX ADMIN — GABAPENTIN 300 MG: 300 CAPSULE ORAL at 16:55

## 2021-01-01 RX ADMIN — FERROUS SULFATE TAB 325 MG (65 MG ELEMENTAL FE) 325 MG: 325 (65 FE) TAB at 08:33

## 2021-01-01 RX ADMIN — OXYCODONE HYDROCHLORIDE 10 MG: 10 TABLET ORAL at 17:32

## 2021-01-01 RX ADMIN — MIDODRINE HYDROCHLORIDE 5 MG: 5 TABLET ORAL at 06:01

## 2021-01-01 RX ADMIN — DIGOXIN 250 MCG: 250 TABLET ORAL at 09:11

## 2021-01-01 RX ADMIN — Medication 20 MG/HR: at 03:36

## 2021-01-01 RX ADMIN — MIDODRINE HYDROCHLORIDE 5 MG: 5 TABLET ORAL at 06:30

## 2021-01-01 RX ADMIN — OXYCODONE HYDROCHLORIDE AND ACETAMINOPHEN 1 TABLET: 5; 325 TABLET ORAL at 21:04

## 2021-01-01 RX ADMIN — ALBUMIN (HUMAN) 25 G: 0.25 INJECTION, SOLUTION INTRAVENOUS at 21:58

## 2021-01-01 RX ADMIN — FUROSEMIDE 60 MG: 10 INJECTION, SOLUTION INTRAMUSCULAR; INTRAVENOUS at 17:08

## 2021-01-01 RX ADMIN — BISACODYL 10 MG: 5 TABLET, COATED ORAL at 17:42

## 2021-01-01 RX ADMIN — MIDODRINE HYDROCHLORIDE 10 MG: 5 TABLET ORAL at 10:50

## 2021-01-01 RX ADMIN — OXYCODONE HYDROCHLORIDE 5 MG: 5 TABLET ORAL at 19:30

## 2021-01-01 RX ADMIN — HYDROMORPHONE HYDROCHLORIDE 0.5 MG: 1 INJECTION, SOLUTION INTRAMUSCULAR; INTRAVENOUS; SUBCUTANEOUS at 22:50

## 2021-01-01 RX ADMIN — POLYVINYL ALCOHOL 2 DROP: 14 SOLUTION/ DROPS OPHTHALMIC at 17:09

## 2021-01-01 RX ADMIN — GABAPENTIN 300 MG: 300 CAPSULE ORAL at 08:20

## 2021-01-01 RX ADMIN — OXYCODONE HYDROCHLORIDE 10 MG: 10 TABLET ORAL at 18:35

## 2021-01-01 RX ADMIN — HEPARIN SODIUM 7500 UNITS: 5000 INJECTION INTRAVENOUS; SUBCUTANEOUS at 21:05

## 2021-01-01 RX ADMIN — HYDROMORPHONE HYDROCHLORIDE 0.2 MG: 1 INJECTION, SOLUTION INTRAMUSCULAR; INTRAVENOUS; SUBCUTANEOUS at 15:02

## 2021-01-01 RX ADMIN — SENNOSIDES AND DOCUSATE SODIUM 2 TABLET: 50; 8.6 TABLET ORAL at 10:31

## 2021-01-01 RX ADMIN — POTASSIUM CHLORIDE 40 MEQ: 1500 TABLET, EXTENDED RELEASE ORAL at 17:40

## 2021-01-01 RX ADMIN — FERROUS SULFATE TAB 325 MG (65 MG ELEMENTAL FE) 325 MG: 325 (65 FE) TAB at 08:52

## 2021-01-01 RX ADMIN — ALBUMIN (HUMAN) 12.5 G: 12.5 INJECTION, SOLUTION INTRAVENOUS at 01:45

## 2021-01-01 RX ADMIN — MIDODRINE HYDROCHLORIDE 5 MG: 5 TABLET ORAL at 11:13

## 2021-01-01 RX ADMIN — ACETAMINOPHEN 650 MG: 325 TABLET, FILM COATED ORAL at 19:30

## 2021-01-01 RX ADMIN — BISACODYL 10 MG: 5 TABLET, COATED ORAL at 09:18

## 2021-01-01 RX ADMIN — Medication 10 MG/HR: at 09:31

## 2021-01-01 RX ADMIN — CARVEDILOL 6.25 MG: 3.12 TABLET, FILM COATED ORAL at 08:45

## 2021-01-01 RX ADMIN — WARFARIN SODIUM 5 MG: 5 TABLET ORAL at 17:42

## 2021-01-01 RX ADMIN — OCTREOTIDE ACETATE 50 MCG: 100 INJECTION, SOLUTION INTRAVENOUS; SUBCUTANEOUS at 06:04

## 2021-01-01 RX ADMIN — POTASSIUM CHLORIDE 20 MEQ: 1500 TABLET, EXTENDED RELEASE ORAL at 09:48

## 2021-01-01 RX ADMIN — DIGOXIN 250 MCG: 250 INJECTION, SOLUTION INTRAMUSCULAR; INTRAVENOUS; PARENTERAL at 18:08

## 2021-01-01 RX ADMIN — MIDODRINE HYDROCHLORIDE 15 MG: 5 TABLET ORAL at 12:21

## 2021-01-01 RX ADMIN — ALBUMIN (HUMAN) 25 G: 0.25 INJECTION, SOLUTION INTRAVENOUS at 10:16

## 2021-01-01 RX ADMIN — ACETAMINOPHEN 650 MG: 325 TABLET, FILM COATED ORAL at 11:53

## 2021-01-01 RX ADMIN — FUROSEMIDE 40 MG: 40 TABLET ORAL at 10:04

## 2021-01-01 RX ADMIN — SODIUM CHLORIDE 500 ML: 0.9 INJECTION, SOLUTION INTRAVENOUS at 12:01

## 2021-01-01 RX ADMIN — ACETAMINOPHEN 650 MG: 325 TABLET, FILM COATED ORAL at 23:33

## 2021-01-01 RX ADMIN — GABAPENTIN 300 MG: 300 CAPSULE ORAL at 21:56

## 2021-01-01 RX ADMIN — Medication 2000 UNITS: at 08:39

## 2021-01-01 RX ADMIN — METOPROLOL SUCCINATE 25 MG: 25 TABLET, FILM COATED, EXTENDED RELEASE ORAL at 21:48

## 2021-01-01 RX ADMIN — ALLOPURINOL 100 MG: 100 TABLET ORAL at 11:53

## 2021-01-01 RX ADMIN — FUROSEMIDE 40 MG: 40 TABLET ORAL at 10:28

## 2021-01-01 RX ADMIN — OXYCODONE HYDROCHLORIDE AND ACETAMINOPHEN 1 TABLET: 5; 325 TABLET ORAL at 08:21

## 2021-01-01 RX ADMIN — WARFARIN SODIUM 5 MG: 5 TABLET ORAL at 17:26

## 2021-01-01 RX ADMIN — GABAPENTIN 300 MG: 300 CAPSULE ORAL at 10:18

## 2021-01-01 RX ADMIN — OXYCODONE HYDROCHLORIDE 5 MG: 5 TABLET ORAL at 18:28

## 2021-01-01 RX ADMIN — FUROSEMIDE 40 MG: 10 INJECTION, SOLUTION INTRAVENOUS at 17:35

## 2021-01-01 RX ADMIN — CEFEPIME HYDROCHLORIDE 2000 MG: 2 INJECTION, SOLUTION INTRAVENOUS at 09:31

## 2021-01-01 RX ADMIN — MIDODRINE HYDROCHLORIDE 10 MG: 5 TABLET ORAL at 12:06

## 2021-01-01 RX ADMIN — SODIUM CHLORIDE 500 ML: 0.9 INJECTION, SOLUTION INTRAVENOUS at 13:09

## 2021-01-01 RX ADMIN — BUMETANIDE 1 MG: 0.25 INJECTION, SOLUTION INTRAMUSCULAR; INTRAVENOUS at 08:27

## 2021-01-01 RX ADMIN — POTASSIUM CHLORIDE 20 MEQ: 1500 TABLET, EXTENDED RELEASE ORAL at 08:06

## 2021-01-01 RX ADMIN — PANTOPRAZOLE SODIUM 40 MG: 40 TABLET, DELAYED RELEASE ORAL at 05:26

## 2021-01-01 RX ADMIN — OXYCODONE HYDROCHLORIDE 5 MG: 5 TABLET ORAL at 13:31

## 2021-01-01 RX ADMIN — INSULIN LISPRO 1 UNITS: 100 INJECTION, SOLUTION INTRAVENOUS; SUBCUTANEOUS at 21:29

## 2021-01-01 RX ADMIN — OXYCODONE HYDROCHLORIDE AND ACETAMINOPHEN 1 TABLET: 5; 325 TABLET ORAL at 16:45

## 2021-01-01 RX ADMIN — METOPROLOL SUCCINATE 25 MG: 25 TABLET, FILM COATED, EXTENDED RELEASE ORAL at 08:59

## 2021-01-01 RX ADMIN — CARVEDILOL 6.25 MG: 3.12 TABLET, FILM COATED ORAL at 07:29

## 2021-01-01 RX ADMIN — MIDODRINE HYDROCHLORIDE 15 MG: 5 TABLET ORAL at 20:11

## 2021-01-01 RX ADMIN — OXYCODONE HYDROCHLORIDE 5 MG: 5 TABLET ORAL at 17:15

## 2021-01-01 RX ADMIN — GLYCOPYRROLATE 0.6 MG: 0.2 INJECTION, SOLUTION INTRAMUSCULAR; INTRAVENOUS at 16:40

## 2021-01-01 RX ADMIN — POTASSIUM CHLORIDE 40 MEQ: 20 TABLET, EXTENDED RELEASE ORAL at 17:42

## 2021-01-01 RX ADMIN — ACETAMINOPHEN 975 MG: 325 TABLET, FILM COATED ORAL at 13:41

## 2021-01-01 RX ADMIN — OXYCODONE HYDROCHLORIDE 5 MG: 5 TABLET ORAL at 16:08

## 2021-01-01 RX ADMIN — Medication 2000 UNITS: at 08:37

## 2021-01-01 RX ADMIN — OXYCODONE HYDROCHLORIDE AND ACETAMINOPHEN 1 TABLET: 5; 325 TABLET ORAL at 03:30

## 2021-01-01 RX ADMIN — OXYCODONE HYDROCHLORIDE AND ACETAMINOPHEN 1 TABLET: 5; 325 TABLET ORAL at 22:26

## 2021-01-01 RX ADMIN — ACETAMINOPHEN 975 MG: 325 TABLET, FILM COATED ORAL at 05:32

## 2021-01-01 RX ADMIN — BISACODYL 5 MG: 5 TABLET, COATED ORAL at 17:22

## 2021-01-01 RX ADMIN — ACETAMINOPHEN 650 MG: 325 TABLET, FILM COATED ORAL at 17:22

## 2021-01-01 RX ADMIN — OXYCODONE HYDROCHLORIDE 5 MG: 5 TABLET ORAL at 04:30

## 2021-01-01 RX ADMIN — CARVEDILOL 6.25 MG: 6.25 TABLET, FILM COATED ORAL at 16:49

## 2021-01-01 RX ADMIN — SENNOSIDES AND DOCUSATE SODIUM 2 TABLET: 50; 8.6 TABLET ORAL at 08:59

## 2021-01-01 RX ADMIN — METOROPROLOL TARTRATE 2.5 MG: 5 INJECTION, SOLUTION INTRAVENOUS at 20:41

## 2021-01-01 RX ADMIN — DIGOXIN 250 MCG: 250 TABLET ORAL at 08:41

## 2021-01-01 RX ADMIN — FERROUS SULFATE TAB 325 MG (65 MG ELEMENTAL FE) 325 MG: 325 (65 FE) TAB at 08:23

## 2021-01-01 RX ADMIN — POTASSIUM CHLORIDE 40 MEQ: 1500 TABLET, EXTENDED RELEASE ORAL at 09:04

## 2021-01-01 RX ADMIN — CARVEDILOL 6.25 MG: 3.12 TABLET, FILM COATED ORAL at 16:39

## 2021-01-01 RX ADMIN — PANTOPRAZOLE SODIUM 40 MG: 40 TABLET, DELAYED RELEASE ORAL at 05:04

## 2021-01-01 RX ADMIN — POTASSIUM CHLORIDE 40 MEQ: 20 TABLET, EXTENDED RELEASE ORAL at 08:09

## 2021-01-01 RX ADMIN — Medication 2000 UNITS: at 08:00

## 2021-01-01 RX ADMIN — WARFARIN SODIUM 5 MG: 5 TABLET ORAL at 17:38

## 2021-01-01 RX ADMIN — OXYCODONE HYDROCHLORIDE 5 MG: 5 TABLET ORAL at 13:47

## 2021-01-01 RX ADMIN — ALLOPURINOL 100 MG: 100 TABLET ORAL at 17:31

## 2021-01-01 RX ADMIN — OXYCODONE HYDROCHLORIDE 5 MG: 5 TABLET ORAL at 05:18

## 2021-01-01 RX ADMIN — MIDODRINE HYDROCHLORIDE 7.5 MG: 5 TABLET ORAL at 11:41

## 2021-01-01 RX ADMIN — FUROSEMIDE 40 MG: 10 INJECTION, SOLUTION INTRAMUSCULAR; INTRAVENOUS at 12:43

## 2021-01-01 RX ADMIN — POTASSIUM CHLORIDE 40 MEQ: 1500 TABLET, EXTENDED RELEASE ORAL at 21:17

## 2021-01-01 RX ADMIN — COLCHICINE 0.6 MG: 0.6 TABLET ORAL at 08:49

## 2021-01-01 RX ADMIN — MIDODRINE HYDROCHLORIDE 5 MG: 5 TABLET ORAL at 15:58

## 2021-01-01 RX ADMIN — POTASSIUM CHLORIDE 20 MEQ: 1500 TABLET, EXTENDED RELEASE ORAL at 07:41

## 2021-01-01 RX ADMIN — Medication 2000 UNITS: at 08:20

## 2021-01-01 RX ADMIN — MAGNESIUM SULFATE HEPTAHYDRATE 2 G: 40 INJECTION, SOLUTION INTRAVENOUS at 11:58

## 2021-01-01 RX ADMIN — DOCUSATE SODIUM AND SENNOSIDES 1 TABLET: 8.6; 5 TABLET ORAL at 17:31

## 2021-01-01 RX ADMIN — TRAMADOL HYDROCHLORIDE 50 MG: 50 TABLET, FILM COATED ORAL at 13:25

## 2021-01-01 RX ADMIN — PANTOPRAZOLE SODIUM 40 MG: 40 TABLET, DELAYED RELEASE ORAL at 05:09

## 2021-01-01 RX ADMIN — POTASSIUM CHLORIDE 20 MEQ: 1500 TABLET, EXTENDED RELEASE ORAL at 09:18

## 2021-01-01 RX ADMIN — METOLAZONE 2.5 MG: 2.5 TABLET ORAL at 12:37

## 2021-01-01 RX ADMIN — DOCUSATE SODIUM 100 MG: 100 CAPSULE, LIQUID FILLED ORAL at 08:39

## 2021-01-01 RX ADMIN — POTASSIUM CHLORIDE 40 MEQ: 1500 TABLET, EXTENDED RELEASE ORAL at 08:19

## 2021-01-01 RX ADMIN — POTASSIUM CHLORIDE 20 MEQ: 20 TABLET, EXTENDED RELEASE ORAL at 08:52

## 2021-01-01 RX ADMIN — MIDODRINE HYDROCHLORIDE 15 MG: 5 TABLET ORAL at 22:04

## 2021-01-01 RX ADMIN — Medication 10 MG/HR: at 05:30

## 2021-01-01 RX ADMIN — FERROUS SULFATE TAB 325 MG (65 MG ELEMENTAL FE) 325 MG: 325 (65 FE) TAB at 07:29

## 2021-01-01 RX ADMIN — DIGOXIN 250 MCG: 250 TABLET ORAL at 08:19

## 2021-01-01 RX ADMIN — POTASSIUM CHLORIDE 40 MEQ: 20 TABLET, EXTENDED RELEASE ORAL at 12:59

## 2021-01-01 RX ADMIN — OXYCODONE HYDROCHLORIDE 5 MG: 5 TABLET ORAL at 21:31

## 2021-01-01 RX ADMIN — OXYCODONE HYDROCHLORIDE 5 MG: 5 TABLET ORAL at 10:29

## 2021-01-01 RX ADMIN — ACETAMINOPHEN 975 MG: 325 TABLET, FILM COATED ORAL at 21:04

## 2021-01-01 RX ADMIN — Medication 2000 UNITS: at 08:41

## 2021-01-01 RX ADMIN — NEOSTIGMINE METHYLSULFATE 4 MG: 1 INJECTION INTRAVENOUS at 16:40

## 2021-01-01 RX ADMIN — SODIUM CHLORIDE 125 ML/HR: 0.9 INJECTION, SOLUTION INTRAVENOUS at 17:34

## 2021-01-01 RX ADMIN — HYDROMORPHONE HYDROCHLORIDE 0.5 MG: 1 INJECTION, SOLUTION INTRAMUSCULAR; INTRAVENOUS; SUBCUTANEOUS at 22:05

## 2021-01-01 RX ADMIN — SENNOSIDES AND DOCUSATE SODIUM 2 TABLET: 50; 8.6 TABLET ORAL at 08:52

## 2021-01-01 RX ADMIN — POTASSIUM CHLORIDE 20 MEQ: 14.9 INJECTION, SOLUTION INTRAVENOUS at 18:11

## 2021-01-01 RX ADMIN — FUROSEMIDE 80 MG: 80 TABLET ORAL at 21:41

## 2021-01-01 RX ADMIN — HYDROMORPHONE HYDROCHLORIDE 0.5 MG: 1 INJECTION, SOLUTION INTRAMUSCULAR; INTRAVENOUS; SUBCUTANEOUS at 01:54

## 2021-01-01 RX ADMIN — OXYCODONE HYDROCHLORIDE AND ACETAMINOPHEN 1 TABLET: 5; 325 TABLET ORAL at 22:30

## 2021-01-01 RX ADMIN — FUROSEMIDE 40 MG: 10 INJECTION, SOLUTION INTRAVENOUS at 05:08

## 2021-01-01 RX ADMIN — MIDODRINE HYDROCHLORIDE 5 MG: 5 TABLET ORAL at 11:23

## 2021-01-01 RX ADMIN — PREDNISONE 40 MG: 20 TABLET ORAL at 11:05

## 2021-01-01 RX ADMIN — MIDODRINE HYDROCHLORIDE 15 MG: 5 TABLET ORAL at 03:41

## 2021-01-01 RX ADMIN — CARVEDILOL 6.25 MG: 3.12 TABLET, FILM COATED ORAL at 08:42

## 2021-01-01 RX ADMIN — FENTANYL CITRATE 50 MCG: 50 INJECTION INTRAMUSCULAR; INTRAVENOUS at 13:13

## 2021-01-01 RX ADMIN — PANTOPRAZOLE SODIUM 40 MG: 40 TABLET, DELAYED RELEASE ORAL at 05:43

## 2021-01-01 RX ADMIN — OXYCODONE HYDROCHLORIDE 10 MG: 10 TABLET ORAL at 11:39

## 2021-01-01 RX ADMIN — WARFARIN SODIUM 5 MG: 5 TABLET ORAL at 17:35

## 2021-01-01 RX ADMIN — HYDROMORPHONE HYDROCHLORIDE 0.5 MG: 1 INJECTION, SOLUTION INTRAMUSCULAR; INTRAVENOUS; SUBCUTANEOUS at 00:01

## 2021-01-01 RX ADMIN — METOPROLOL SUCCINATE 25 MG: 25 TABLET, FILM COATED, EXTENDED RELEASE ORAL at 22:30

## 2021-01-01 RX ADMIN — INSULIN LISPRO 1 UNITS: 100 INJECTION, SOLUTION INTRAVENOUS; SUBCUTANEOUS at 21:13

## 2021-01-01 RX ADMIN — HYDROMORPHONE HYDROCHLORIDE 0.5 MG: 1 INJECTION, SOLUTION INTRAMUSCULAR; INTRAVENOUS; SUBCUTANEOUS at 00:44

## 2021-01-01 RX ADMIN — HYDROMORPHONE HYDROCHLORIDE 0.5 MG: 1 INJECTION, SOLUTION INTRAMUSCULAR; INTRAVENOUS; SUBCUTANEOUS at 07:57

## 2021-01-01 RX ADMIN — MIDAZOLAM 1 MG: 1 INJECTION INTRAMUSCULAR; INTRAVENOUS at 22:37

## 2021-01-01 RX ADMIN — INSULIN LISPRO 1 UNITS: 100 INJECTION, SOLUTION INTRAVENOUS; SUBCUTANEOUS at 23:00

## 2021-01-01 RX ADMIN — DOCUSATE SODIUM 100 MG: 100 CAPSULE, LIQUID FILLED ORAL at 18:17

## 2021-01-01 RX ADMIN — FUROSEMIDE 120 MG: 40 TABLET ORAL at 18:35

## 2021-01-01 RX ADMIN — WARFARIN SODIUM 5 MG: 5 TABLET ORAL at 17:25

## 2021-01-01 RX ADMIN — VANCOMYCIN HYDROCHLORIDE 1250 MG: 1 INJECTION, POWDER, LYOPHILIZED, FOR SOLUTION INTRAVENOUS at 14:39

## 2021-01-01 RX ADMIN — VANCOMYCIN HYDROCHLORIDE 1500 MG: 1 INJECTION, POWDER, LYOPHILIZED, FOR SOLUTION INTRAVENOUS at 23:55

## 2021-01-01 RX ADMIN — BISACODYL 5 MG: 5 TABLET, COATED ORAL at 18:45

## 2021-01-01 RX ADMIN — MIDODRINE HYDROCHLORIDE 5 MG: 5 TABLET ORAL at 03:34

## 2021-01-01 RX ADMIN — DOCUSATE SODIUM 100 MG: 100 CAPSULE, LIQUID FILLED ORAL at 17:10

## 2021-01-01 RX ADMIN — POTASSIUM CHLORIDE 20 MEQ: 1500 TABLET, EXTENDED RELEASE ORAL at 17:40

## 2021-01-01 RX ADMIN — POLYETHYLENE GLYCOL 3350 17 G: 17 POWDER, FOR SOLUTION ORAL at 09:47

## 2021-01-01 RX ADMIN — CEFEPIME HYDROCHLORIDE 2000 MG: 2 INJECTION, SOLUTION INTRAVENOUS at 22:26

## 2021-01-01 RX ADMIN — CARVEDILOL 12.5 MG: 12.5 TABLET, FILM COATED ORAL at 08:28

## 2021-01-01 RX ADMIN — HYDROMORPHONE HYDROCHLORIDE 0.5 MG: 1 INJECTION, SOLUTION INTRAMUSCULAR; INTRAVENOUS; SUBCUTANEOUS at 08:13

## 2021-01-01 RX ADMIN — POTASSIUM CHLORIDE 20 MEQ: 20 TABLET, EXTENDED RELEASE ORAL at 15:09

## 2021-01-01 RX ADMIN — POTASSIUM CHLORIDE 20 MEQ: 1500 TABLET, EXTENDED RELEASE ORAL at 18:56

## 2021-01-01 RX ADMIN — POTASSIUM CHLORIDE 20 MEQ: 20 SOLUTION ORAL at 08:25

## 2021-01-01 RX ADMIN — INSULIN LISPRO 1 UNITS: 100 INJECTION, SOLUTION INTRAVENOUS; SUBCUTANEOUS at 12:45

## 2021-01-01 RX ADMIN — MIDODRINE HYDROCHLORIDE 10 MG: 5 TABLET ORAL at 12:27

## 2021-01-01 RX ADMIN — ACETAMINOPHEN 975 MG: 325 TABLET, FILM COATED ORAL at 23:00

## 2021-01-01 RX ADMIN — VANCOMYCIN HYDROCHLORIDE 1250 MG: 500 INJECTION, POWDER, LYOPHILIZED, FOR SOLUTION INTRAVENOUS at 10:01

## 2021-01-01 RX ADMIN — INSULIN LISPRO 1 UNITS: 100 INJECTION, SOLUTION INTRAVENOUS; SUBCUTANEOUS at 11:13

## 2021-01-01 RX ADMIN — OXYCODONE HYDROCHLORIDE AND ACETAMINOPHEN 1 TABLET: 5; 325 TABLET ORAL at 16:59

## 2021-01-01 RX ADMIN — ACETAZOLAMIDE 250 MG: 250 TABLET ORAL at 08:23

## 2021-01-01 RX ADMIN — ACETAMINOPHEN 650 MG: 325 TABLET, FILM COATED ORAL at 06:13

## 2021-01-01 RX ADMIN — MIDODRINE HYDROCHLORIDE 10 MG: 5 TABLET ORAL at 11:41

## 2021-01-01 RX ADMIN — POTASSIUM CHLORIDE 20 MEQ: 20 TABLET, EXTENDED RELEASE ORAL at 09:11

## 2021-01-01 RX ADMIN — FERUMOXYTOL 510 MG: 510 INJECTION INTRAVENOUS at 11:53

## 2021-01-01 RX ADMIN — POTASSIUM CHLORIDE 40 MEQ: 1500 TABLET, EXTENDED RELEASE ORAL at 17:35

## 2021-01-01 RX ADMIN — METOROPROLOL TARTRATE 2.5 MG: 5 INJECTION, SOLUTION INTRAVENOUS at 06:12

## 2021-01-01 RX ADMIN — OXYCODONE HYDROCHLORIDE 5 MG: 5 TABLET ORAL at 16:18

## 2021-01-01 RX ADMIN — SODIUM CHLORIDE, SODIUM LACTATE, POTASSIUM CHLORIDE, AND CALCIUM CHLORIDE 1000 ML: .6; .31; .03; .02 INJECTION, SOLUTION INTRAVENOUS at 11:38

## 2021-01-01 RX ADMIN — CEFEPIME HYDROCHLORIDE 2000 MG: 2 INJECTION, SOLUTION INTRAVENOUS at 21:33

## 2021-01-01 RX ADMIN — SENNOSIDES AND DOCUSATE SODIUM 2 TABLET: 50; 8.6 TABLET ORAL at 17:13

## 2021-01-01 RX ADMIN — MIDODRINE HYDROCHLORIDE 7.5 MG: 5 TABLET ORAL at 17:48

## 2021-01-01 RX ADMIN — MIDODRINE HYDROCHLORIDE 15 MG: 5 TABLET ORAL at 11:39

## 2021-01-01 RX ADMIN — METOPROLOL TARTRATE 25 MG: 25 TABLET, FILM COATED ORAL at 21:06

## 2021-01-01 RX ADMIN — POTASSIUM CHLORIDE 20 MEQ: 1500 TABLET, EXTENDED RELEASE ORAL at 22:38

## 2021-01-01 RX ADMIN — OXYCODONE HYDROCHLORIDE 5 MG: 5 TABLET ORAL at 22:30

## 2021-01-01 RX ADMIN — HYDROMORPHONE HYDROCHLORIDE 0.5 MG: 1 INJECTION, SOLUTION INTRAMUSCULAR; INTRAVENOUS; SUBCUTANEOUS at 20:15

## 2021-01-01 RX ADMIN — PANTOPRAZOLE SODIUM 40 MG: 40 TABLET, DELAYED RELEASE ORAL at 05:02

## 2021-01-01 RX ADMIN — GABAPENTIN 300 MG: 300 CAPSULE ORAL at 08:28

## 2021-01-01 RX ADMIN — OXYCODONE HYDROCHLORIDE 5 MG: 5 TABLET ORAL at 09:17

## 2021-01-01 RX ADMIN — DOCUSATE SODIUM 100 MG: 100 CAPSULE, LIQUID FILLED ORAL at 17:20

## 2021-01-01 RX ADMIN — Medication 20 MG/HR: at 02:39

## 2021-01-01 RX ADMIN — CALCIUM CARBONATE (ANTACID) CHEW TAB 500 MG 500 MG: 500 CHEW TAB at 19:30

## 2021-01-01 RX ADMIN — OCTREOTIDE ACETATE 50 MCG: 100 INJECTION, SOLUTION INTRAVENOUS; SUBCUTANEOUS at 12:23

## 2021-01-01 RX ADMIN — ALBUMIN (HUMAN) 12.5 G: 0.25 INJECTION, SOLUTION INTRAVENOUS at 20:40

## 2021-01-01 RX ADMIN — BUMETANIDE 1 MG: 0.25 INJECTION, SOLUTION INTRAMUSCULAR; INTRAVENOUS at 17:23

## 2021-01-01 RX ADMIN — POTASSIUM CHLORIDE 20 MEQ: 1500 TABLET, EXTENDED RELEASE ORAL at 23:58

## 2021-01-01 RX ADMIN — FUROSEMIDE 40 MG: 10 INJECTION, SOLUTION INTRAVENOUS at 17:26

## 2021-01-01 RX ADMIN — CARVEDILOL 6.25 MG: 6.25 TABLET, FILM COATED ORAL at 17:28

## 2021-01-01 RX ADMIN — PANTOPRAZOLE SODIUM 40 MG: 40 TABLET, DELAYED RELEASE ORAL at 05:14

## 2021-01-01 RX ADMIN — POTASSIUM CHLORIDE 20 MEQ: 1500 TABLET, EXTENDED RELEASE ORAL at 14:50

## 2021-01-01 RX ADMIN — METOPROLOL TARTRATE 37.5 MG: 25 TABLET, FILM COATED ORAL at 08:56

## 2021-01-01 RX ADMIN — GABAPENTIN 300 MG: 300 CAPSULE ORAL at 21:31

## 2021-01-01 RX ADMIN — Medication 1 PATCH: at 08:49

## 2021-01-01 RX ADMIN — CEFEPIME HYDROCHLORIDE 2000 MG: 2 INJECTION, SOLUTION INTRAVENOUS at 21:59

## 2021-01-01 RX ADMIN — MIDODRINE HYDROCHLORIDE 5 MG: 5 TABLET ORAL at 06:07

## 2021-01-01 RX ADMIN — OXYCODONE HYDROCHLORIDE 10 MG: 10 TABLET ORAL at 23:14

## 2021-01-01 RX ADMIN — GABAPENTIN 300 MG: 300 CAPSULE ORAL at 18:08

## 2021-01-01 RX ADMIN — OXYCODONE HYDROCHLORIDE 5 MG: 5 TABLET ORAL at 21:56

## 2021-01-01 RX ADMIN — OXYCODONE HYDROCHLORIDE 5 MG: 5 TABLET ORAL at 21:51

## 2021-01-01 RX ADMIN — POTASSIUM CHLORIDE 40 MEQ: 1500 TABLET, EXTENDED RELEASE ORAL at 08:56

## 2021-01-01 RX ADMIN — CEFAZOLIN SODIUM 2000 MG: 2 SOLUTION INTRAVENOUS at 05:47

## 2021-01-01 RX ADMIN — INSULIN LISPRO 2 UNITS: 100 INJECTION, SOLUTION INTRAVENOUS; SUBCUTANEOUS at 11:48

## 2021-01-01 RX ADMIN — GABAPENTIN 300 MG: 300 CAPSULE ORAL at 21:48

## 2021-01-01 RX ADMIN — MIDODRINE HYDROCHLORIDE 15 MG: 5 TABLET ORAL at 11:09

## 2021-01-01 RX ADMIN — POLYETHYLENE GLYCOL 3350 17 G: 17 POWDER, FOR SOLUTION ORAL at 22:16

## 2021-01-01 RX ADMIN — MIDODRINE HYDROCHLORIDE 5 MG: 5 TABLET ORAL at 10:14

## 2021-01-01 RX ADMIN — POTASSIUM CHLORIDE 40 MEQ: 20 TABLET, EXTENDED RELEASE ORAL at 08:33

## 2021-01-01 RX ADMIN — MIDODRINE HYDROCHLORIDE 15 MG: 5 TABLET ORAL at 05:24

## 2021-01-01 RX ADMIN — OXYCODONE HYDROCHLORIDE 10 MG: 10 TABLET ORAL at 11:57

## 2021-01-01 RX ADMIN — POLYETHYLENE GLYCOL 3350 17 G: 17 POWDER, FOR SOLUTION ORAL at 21:31

## 2021-01-01 RX ADMIN — GABAPENTIN 300 MG: 300 CAPSULE ORAL at 22:29

## 2021-01-01 RX ADMIN — ALBUMIN (HUMAN) 25 G: 12.5 INJECTION, SOLUTION INTRAVENOUS at 22:01

## 2021-01-01 RX ADMIN — HYDROMORPHONE HYDROCHLORIDE 0.5 MG: 1 INJECTION, SOLUTION INTRAMUSCULAR; INTRAVENOUS; SUBCUTANEOUS at 18:34

## 2021-01-01 RX ADMIN — WARFARIN SODIUM 5 MG: 5 TABLET ORAL at 17:55

## 2021-01-01 RX ADMIN — FENTANYL CITRATE 25 MCG: 50 INJECTION INTRAMUSCULAR; INTRAVENOUS at 13:36

## 2021-01-01 RX ADMIN — DORNASE ALFA 5 MG: 1 SOLUTION RESPIRATORY (INHALATION) at 09:20

## 2021-01-01 RX ADMIN — CALCIUM CARBONATE (ANTACID) CHEW TAB 500 MG 500 MG: 500 CHEW TAB at 21:56

## 2021-01-01 RX ADMIN — METOPROLOL TARTRATE 50 MG: 50 TABLET, FILM COATED ORAL at 08:32

## 2021-01-01 RX ADMIN — SENNOSIDES AND DOCUSATE SODIUM 2 TABLET: 50; 8.6 TABLET ORAL at 09:17

## 2021-01-01 RX ADMIN — SENNOSIDES AND DOCUSATE SODIUM 2 TABLET: 50; 8.6 TABLET ORAL at 08:20

## 2021-01-01 RX ADMIN — DOCUSATE SODIUM AND SENNOSIDES 1 TABLET: 8.6; 5 TABLET ORAL at 21:35

## 2021-01-01 RX ADMIN — MIDODRINE HYDROCHLORIDE 10 MG: 5 TABLET ORAL at 06:36

## 2021-01-01 RX ADMIN — MIDODRINE HYDROCHLORIDE 15 MG: 5 TABLET ORAL at 12:23

## 2021-01-01 RX ADMIN — MIDODRINE HYDROCHLORIDE 10 MG: 5 TABLET ORAL at 06:10

## 2021-01-01 RX ADMIN — HYDROMORPHONE HYDROCHLORIDE 0.5 MG: 1 INJECTION, SOLUTION INTRAMUSCULAR; INTRAVENOUS; SUBCUTANEOUS at 12:02

## 2021-01-01 RX ADMIN — HYDROMORPHONE HYDROCHLORIDE 0.5 MG: 1 INJECTION, SOLUTION INTRAMUSCULAR; INTRAVENOUS; SUBCUTANEOUS at 06:13

## 2021-01-01 RX ADMIN — NICOTINE 7 MG/24 HR DAILY TRANSDERMAL PATCH 1 PATCH: at 09:05

## 2021-01-01 RX ADMIN — NOREPINEPHRINE BITARTRATE 4 MCG/MIN: 1 INJECTION, SOLUTION, CONCENTRATE INTRAVENOUS at 09:26

## 2021-01-01 RX ADMIN — MIDODRINE HYDROCHLORIDE 10 MG: 5 TABLET ORAL at 17:31

## 2021-01-01 RX ADMIN — MIDODRINE HYDROCHLORIDE 10 MG: 5 TABLET ORAL at 11:15

## 2021-01-01 RX ADMIN — DOCUSATE SODIUM 100 MG: 100 CAPSULE, LIQUID FILLED ORAL at 08:20

## 2021-01-01 RX ADMIN — ERGOCALCIFEROL 50000 UNITS: 1.25 CAPSULE ORAL at 10:36

## 2021-01-01 RX ADMIN — OXYCODONE HYDROCHLORIDE 5 MG: 5 TABLET ORAL at 12:07

## 2021-01-01 RX ADMIN — Medication 2000 UNITS: at 08:25

## 2021-01-01 RX ADMIN — POTASSIUM CHLORIDE 20 MEQ: 1500 TABLET, EXTENDED RELEASE ORAL at 11:23

## 2021-01-01 RX ADMIN — PANTOPRAZOLE SODIUM 40 MG: 40 TABLET, DELAYED RELEASE ORAL at 05:39

## 2021-01-01 RX ADMIN — MIDODRINE HYDROCHLORIDE 10 MG: 5 TABLET ORAL at 06:04

## 2021-01-01 RX ADMIN — MIDODRINE HYDROCHLORIDE 10 MG: 5 TABLET ORAL at 15:27

## 2021-01-01 RX ADMIN — CARVEDILOL 6.25 MG: 3.12 TABLET, FILM COATED ORAL at 16:18

## 2021-01-01 RX ADMIN — COLCHICINE 0.6 MG: 0.6 TABLET ORAL at 11:38

## 2021-01-01 RX ADMIN — CARVEDILOL 6.25 MG: 3.12 TABLET, FILM COATED ORAL at 08:52

## 2021-01-01 RX ADMIN — HEPARIN SODIUM 7500 UNITS: 5000 INJECTION INTRAVENOUS; SUBCUTANEOUS at 05:09

## 2021-01-01 RX ADMIN — POTASSIUM CHLORIDE 20 MEQ: 1500 TABLET, EXTENDED RELEASE ORAL at 15:05

## 2021-01-01 RX ADMIN — ACETAMINOPHEN 650 MG: 325 TABLET, FILM COATED ORAL at 11:29

## 2021-01-01 RX ADMIN — DIGOXIN 250 MCG: 250 TABLET ORAL at 08:26

## 2021-01-01 RX ADMIN — SODIUM CHLORIDE 20 ML/HR: 0.9 INJECTION, SOLUTION INTRAVENOUS at 13:20

## 2021-01-01 RX ADMIN — ONDANSETRON 4 MG: 2 INJECTION INTRAMUSCULAR; INTRAVENOUS at 16:21

## 2021-01-01 RX ADMIN — DOCUSATE SODIUM 100 MG: 100 CAPSULE, LIQUID FILLED ORAL at 17:14

## 2021-01-01 RX ADMIN — OXYCODONE HYDROCHLORIDE 5 MG: 5 TABLET ORAL at 03:27

## 2021-01-01 RX ADMIN — BUMETANIDE 1 MG: 0.25 INJECTION, SOLUTION INTRAMUSCULAR; INTRAVENOUS at 18:09

## 2021-01-01 RX ADMIN — WARFARIN SODIUM 5 MG: 5 TABLET ORAL at 18:17

## 2021-01-01 RX ADMIN — HEPARIN SODIUM 7500 UNITS: 5000 INJECTION INTRAVENOUS; SUBCUTANEOUS at 13:41

## 2021-01-01 RX ADMIN — HYDROMORPHONE HYDROCHLORIDE 0.5 MG: 1 INJECTION, SOLUTION INTRAMUSCULAR; INTRAVENOUS; SUBCUTANEOUS at 13:53

## 2021-01-01 RX ADMIN — DILTIAZEM HYDROCHLORIDE 15 MG/HR: 5 INJECTION INTRAVENOUS at 01:03

## 2021-01-01 RX ADMIN — MIDODRINE HYDROCHLORIDE 15 MG: 5 TABLET ORAL at 20:35

## 2021-01-01 RX ADMIN — SENNOSIDES AND DOCUSATE SODIUM 2 TABLET: 50; 8.6 TABLET ORAL at 17:30

## 2021-01-01 RX ADMIN — OXYCODONE HYDROCHLORIDE 5 MG: 5 TABLET ORAL at 09:33

## 2021-01-01 RX ADMIN — FERUMOXYTOL 510 MG: 510 INJECTION INTRAVENOUS at 13:20

## 2021-01-01 RX ADMIN — MIDODRINE HYDROCHLORIDE 5 MG: 5 TABLET ORAL at 15:28

## 2021-01-01 RX ADMIN — POTASSIUM CHLORIDE 20 MEQ: 1500 TABLET, EXTENDED RELEASE ORAL at 10:31

## 2021-01-01 RX ADMIN — Medication 1 PATCH: at 08:58

## 2021-01-01 RX ADMIN — OXYCODONE HYDROCHLORIDE 5 MG: 5 TABLET ORAL at 15:28

## 2021-01-01 RX ADMIN — DOCUSATE SODIUM 100 MG: 100 CAPSULE, LIQUID FILLED ORAL at 08:10

## 2021-01-01 RX ADMIN — ALBUMIN (HUMAN) 25 G: 0.25 INJECTION, SOLUTION INTRAVENOUS at 03:15

## 2021-01-01 RX ADMIN — FUROSEMIDE 40 MG: 40 TABLET ORAL at 16:24

## 2021-01-01 RX ADMIN — FERROUS SULFATE TAB 325 MG (65 MG ELEMENTAL FE) 325 MG: 325 (65 FE) TAB at 08:10

## 2021-01-01 RX ADMIN — POLYETHYLENE GLYCOL 3350 17 G: 17 POWDER, FOR SOLUTION ORAL at 11:44

## 2021-01-01 RX ADMIN — OXYCODONE HYDROCHLORIDE 5 MG: 5 TABLET ORAL at 09:05

## 2021-01-01 RX ADMIN — METOROPROLOL TARTRATE 2.5 MG: 5 INJECTION, SOLUTION INTRAVENOUS at 11:06

## 2021-01-01 RX ADMIN — SODIUM CHLORIDE: 0.9 INJECTION, SOLUTION INTRAVENOUS at 22:02

## 2021-01-01 RX ADMIN — POTASSIUM CHLORIDE 20 MEQ: 1500 TABLET, EXTENDED RELEASE ORAL at 17:30

## 2021-01-01 RX ADMIN — HYDROMORPHONE HYDROCHLORIDE 0.5 MG: 1 INJECTION, SOLUTION INTRAMUSCULAR; INTRAVENOUS; SUBCUTANEOUS at 17:32

## 2021-01-01 RX ADMIN — CALCIUM GLUCONATE 2 G: 20 INJECTION, SOLUTION INTRAVENOUS at 13:35

## 2021-01-01 RX ADMIN — DOCUSATE SODIUM AND SENNOSIDES 1 TABLET: 8.6; 5 TABLET ORAL at 18:08

## 2021-01-01 RX ADMIN — FUROSEMIDE 80 MG: 80 TABLET ORAL at 17:01

## 2021-01-01 RX ADMIN — WARFARIN SODIUM 5 MG: 5 TABLET ORAL at 18:07

## 2021-01-01 RX ADMIN — OXYCODONE HYDROCHLORIDE 5 MG: 5 TABLET ORAL at 10:40

## 2021-01-01 RX ADMIN — LIDOCAINE: 4 CREAM TOPICAL at 05:48

## 2021-01-01 RX ADMIN — WARFARIN SODIUM 5 MG: 5 TABLET ORAL at 17:10

## 2021-01-01 RX ADMIN — CARVEDILOL 6.25 MG: 3.12 TABLET, FILM COATED ORAL at 17:55

## 2021-01-01 RX ADMIN — OXYCODONE HYDROCHLORIDE 5 MG: 5 TABLET ORAL at 22:08

## 2021-01-01 RX ADMIN — DOCUSATE SODIUM 100 MG: 100 CAPSULE, LIQUID FILLED ORAL at 08:53

## 2021-01-01 RX ADMIN — Medication 3000 MG: at 13:26

## 2021-01-01 RX ADMIN — OXYCODONE HYDROCHLORIDE AND ACETAMINOPHEN 1 TABLET: 5; 325 TABLET ORAL at 23:05

## 2021-01-01 RX ADMIN — ROCURONIUM BROMIDE 50 MG: 50 INJECTION, SOLUTION INTRAVENOUS at 23:08

## 2021-01-01 RX ADMIN — ACETAMINOPHEN 650 MG: 325 TABLET, FILM COATED ORAL at 18:09

## 2021-01-01 RX ADMIN — HEPARIN SODIUM 7500 UNITS: 5000 INJECTION INTRAVENOUS; SUBCUTANEOUS at 13:24

## 2021-01-01 RX ADMIN — ALBUMIN, HUMAN INJ 5% 12.5 G: 5 SOLUTION at 02:37

## 2021-01-01 RX ADMIN — NICOTINE 7 MG/24 HR DAILY TRANSDERMAL PATCH 1 PATCH: at 09:17

## 2021-01-01 RX ADMIN — DOCUSATE SODIUM 100 MG: 100 CAPSULE, LIQUID FILLED ORAL at 17:24

## 2021-01-01 RX ADMIN — BISACODYL 10 MG: 5 TABLET, COATED ORAL at 18:23

## 2021-01-01 RX ADMIN — CARVEDILOL 6.25 MG: 3.12 TABLET, FILM COATED ORAL at 16:36

## 2021-01-01 RX ADMIN — MIDODRINE HYDROCHLORIDE 15 MG: 5 TABLET ORAL at 11:46

## 2021-01-01 RX ADMIN — MIDODRINE HYDROCHLORIDE 10 MG: 5 TABLET ORAL at 18:08

## 2021-01-01 RX ADMIN — MIDODRINE HYDROCHLORIDE 10 MG: 5 TABLET ORAL at 06:05

## 2021-01-01 RX ADMIN — GABAPENTIN 300 MG: 300 CAPSULE ORAL at 09:18

## 2021-01-01 RX ADMIN — COLCHICINE 0.6 MG: 0.6 TABLET ORAL at 08:20

## 2021-01-01 RX ADMIN — MIDODRINE HYDROCHLORIDE 7.5 MG: 5 TABLET ORAL at 17:19

## 2021-01-01 RX ADMIN — CARVEDILOL 6.25 MG: 3.12 TABLET, FILM COATED ORAL at 07:54

## 2021-01-01 RX ADMIN — HYDROMORPHONE HYDROCHLORIDE 0.5 MG: 1 INJECTION, SOLUTION INTRAMUSCULAR; INTRAVENOUS; SUBCUTANEOUS at 09:10

## 2021-01-01 RX ADMIN — BUMETANIDE 1 MG: 1 TABLET ORAL at 08:20

## 2021-01-01 RX ADMIN — MAGNESIUM OXIDE TAB 400 MG (241.3 MG ELEMENTAL MG) 800 MG: 400 (241.3 MG) TAB at 10:40

## 2021-01-01 RX ADMIN — OCTREOTIDE ACETATE 50 MCG: 100 INJECTION, SOLUTION INTRAVENOUS; SUBCUTANEOUS at 21:33

## 2021-01-01 RX ADMIN — CALCIUM CARBONATE (ANTACID) CHEW TAB 500 MG 500 MG: 500 CHEW TAB at 17:37

## 2021-01-01 RX ADMIN — POTASSIUM CHLORIDE 20 MEQ: 20 TABLET, EXTENDED RELEASE ORAL at 16:03

## 2021-01-01 RX ADMIN — POTASSIUM CHLORIDE 20 MEQ: 20 TABLET, EXTENDED RELEASE ORAL at 08:24

## 2021-01-01 RX ADMIN — MENTHOL, UNSPECIFIED FORM AND METHYL SALICYLATE: 10; 150 CREAM TOPICAL at 20:48

## 2021-01-01 RX ADMIN — BISACODYL 10 MG: 5 TABLET, COATED ORAL at 09:29

## 2021-01-01 RX ADMIN — OXYCODONE HYDROCHLORIDE 10 MG: 10 TABLET ORAL at 17:42

## 2021-01-01 RX ADMIN — OXYCODONE HYDROCHLORIDE 10 MG: 10 TABLET ORAL at 22:00

## 2021-01-01 RX ADMIN — POTASSIUM CHLORIDE 40 MEQ: 20 TABLET, EXTENDED RELEASE ORAL at 08:39

## 2021-01-01 RX ADMIN — ACETAMINOPHEN 975 MG: 325 TABLET, FILM COATED ORAL at 05:07

## 2021-01-01 RX ADMIN — FUROSEMIDE 40 MG: 10 INJECTION, SOLUTION INTRAMUSCULAR; INTRAVENOUS at 09:05

## 2021-01-01 RX ADMIN — WARFARIN SODIUM 2.5 MG: 2.5 TABLET ORAL at 17:19

## 2021-01-01 RX ADMIN — Medication 20 MG/HR: at 12:00

## 2021-01-01 RX ADMIN — OXYCODONE HYDROCHLORIDE 5 MG: 5 TABLET ORAL at 16:25

## 2021-01-01 RX ADMIN — ACETAMINOPHEN 975 MG: 325 TABLET, FILM COATED ORAL at 14:01

## 2021-01-01 RX ADMIN — OXYCODONE HYDROCHLORIDE 5 MG: 5 TABLET ORAL at 11:32

## 2021-01-01 RX ADMIN — METOPROLOL SUCCINATE 25 MG: 25 TABLET, FILM COATED, EXTENDED RELEASE ORAL at 09:17

## 2021-01-01 RX ADMIN — METOPROLOL SUCCINATE 25 MG: 25 TABLET, FILM COATED, EXTENDED RELEASE ORAL at 22:14

## 2021-01-01 RX ADMIN — WARFARIN SODIUM 5 MG: 5 TABLET ORAL at 18:24

## 2021-01-01 RX ADMIN — PANTOPRAZOLE SODIUM 40 MG: 40 TABLET, DELAYED RELEASE ORAL at 05:00

## 2021-01-01 RX ADMIN — DILTIAZEM HYDROCHLORIDE 12.5 MG/HR: 5 INJECTION INTRAVENOUS at 21:02

## 2021-01-01 RX ADMIN — Medication 2000 UNITS: at 08:33

## 2021-01-01 RX ADMIN — ACETAMINOPHEN 975 MG: 325 TABLET, FILM COATED ORAL at 12:45

## 2021-01-01 RX ADMIN — WARFARIN SODIUM 7 MG: 6 TABLET ORAL at 17:50

## 2021-01-01 RX ADMIN — OXYCODONE HYDROCHLORIDE 5 MG: 5 TABLET ORAL at 17:38

## 2021-01-01 RX ADMIN — ACETAZOLAMIDE 250 MG: 250 TABLET ORAL at 21:57

## 2021-01-01 RX ADMIN — FUROSEMIDE 120 MG: 40 TABLET ORAL at 08:06

## 2021-01-01 RX ADMIN — HEPARIN SODIUM 5000 UNITS: 5000 INJECTION INTRAVENOUS; SUBCUTANEOUS at 05:07

## 2021-01-01 RX ADMIN — Medication 20 MG/HR: at 13:29

## 2021-01-01 RX ADMIN — BISACODYL 10 MG: 5 TABLET, COATED ORAL at 17:15

## 2021-01-01 RX ADMIN — CALCIUM CARBONATE (ANTACID) CHEW TAB 500 MG 500 MG: 500 CHEW TAB at 10:47

## 2021-01-01 RX ADMIN — OXYCODONE HYDROCHLORIDE AND ACETAMINOPHEN 1 TABLET: 5; 325 TABLET ORAL at 06:52

## 2021-01-01 RX ADMIN — ALBUMIN (HUMAN) 25 G: 12.5 INJECTION, SOLUTION INTRAVENOUS at 11:34

## 2021-01-01 RX ADMIN — GABAPENTIN 300 MG: 300 CAPSULE ORAL at 17:31

## 2021-01-01 RX ADMIN — ACETAMINOPHEN 650 MG: 325 TABLET, FILM COATED ORAL at 21:22

## 2021-01-01 RX ADMIN — HYDROMORPHONE HYDROCHLORIDE 0.2 MG: 0.2 INJECTION, SOLUTION INTRAMUSCULAR; INTRAVENOUS; SUBCUTANEOUS at 00:19

## 2021-01-01 RX ADMIN — MIDODRINE HYDROCHLORIDE 10 MG: 5 TABLET ORAL at 11:38

## 2021-01-01 RX ADMIN — FUROSEMIDE 120 MG: 40 TABLET ORAL at 09:58

## 2021-01-01 RX ADMIN — OXYCODONE HYDROCHLORIDE 5 MG: 5 TABLET ORAL at 17:57

## 2021-01-01 RX ADMIN — ALBUMIN (HUMAN): 12.5 INJECTION, SOLUTION INTRAVENOUS at 22:20

## 2021-01-01 RX ADMIN — BUMETANIDE 1 MG: 1 TABLET ORAL at 08:59

## 2021-01-01 RX ADMIN — FERROUS SULFATE TAB 325 MG (65 MG ELEMENTAL FE) 325 MG: 325 (65 FE) TAB at 07:54

## 2021-01-01 RX ADMIN — HYDROMORPHONE HYDROCHLORIDE 0.5 MG: 1 INJECTION, SOLUTION INTRAMUSCULAR; INTRAVENOUS; SUBCUTANEOUS at 08:33

## 2021-01-01 RX ADMIN — MIDODRINE HYDROCHLORIDE 15 MG: 5 TABLET ORAL at 03:57

## 2021-01-01 RX ADMIN — PANTOPRAZOLE SODIUM 40 MG: 40 TABLET, DELAYED RELEASE ORAL at 06:04

## 2021-01-01 RX ADMIN — CEFEPIME HYDROCHLORIDE 1000 MG: 1 INJECTION, SOLUTION INTRAVENOUS at 08:57

## 2021-01-01 RX ADMIN — OXYCODONE HYDROCHLORIDE 5 MG: 5 TABLET ORAL at 05:21

## 2021-01-01 RX ADMIN — GABAPENTIN 300 MG: 300 CAPSULE ORAL at 21:35

## 2021-01-01 RX ADMIN — CEFEPIME HYDROCHLORIDE 2000 MG: 2 INJECTION, SOLUTION INTRAVENOUS at 22:15

## 2021-01-01 RX ADMIN — DOCUSATE SODIUM AND SENNOSIDES 1 TABLET: 8.6; 5 TABLET ORAL at 17:37

## 2021-01-01 RX ADMIN — Medication 2000 UNITS: at 08:09

## 2021-01-01 RX ADMIN — HEPARIN SODIUM 7500 UNITS: 5000 INJECTION INTRAVENOUS; SUBCUTANEOUS at 14:15

## 2021-01-01 RX ADMIN — MIDODRINE HYDROCHLORIDE 10 MG: 5 TABLET ORAL at 17:21

## 2021-01-01 RX ADMIN — OCTREOTIDE ACETATE 50 MCG: 100 INJECTION, SOLUTION INTRAVENOUS; SUBCUTANEOUS at 14:39

## 2021-01-01 RX ADMIN — ACETAMINOPHEN 650 MG: 325 TABLET, FILM COATED ORAL at 11:38

## 2021-01-01 RX ADMIN — OXYCODONE HYDROCHLORIDE 5 MG: 5 TABLET ORAL at 10:38

## 2021-01-01 RX ADMIN — OXYCODONE HYDROCHLORIDE 10 MG: 10 TABLET ORAL at 20:46

## 2021-01-01 RX ADMIN — DOCUSATE SODIUM 100 MG: 100 CAPSULE, LIQUID FILLED ORAL at 17:16

## 2021-01-01 RX ADMIN — DILTIAZEM HYDROCHLORIDE 5 MG/HR: 5 INJECTION INTRAVENOUS at 17:55

## 2021-01-01 RX ADMIN — MIDODRINE HYDROCHLORIDE 7.5 MG: 5 TABLET ORAL at 12:36

## 2021-01-01 RX ADMIN — ACETAMINOPHEN 975 MG: 325 TABLET, FILM COATED ORAL at 05:24

## 2021-01-01 RX ADMIN — OXYCODONE HYDROCHLORIDE 5 MG: 5 TABLET ORAL at 20:04

## 2021-01-01 RX ADMIN — INSULIN LISPRO 2 UNITS: 100 INJECTION, SOLUTION INTRAVENOUS; SUBCUTANEOUS at 17:48

## 2021-01-01 RX ADMIN — HEPARIN SODIUM 5000 UNITS: 5000 INJECTION INTRAVENOUS; SUBCUTANEOUS at 23:00

## 2021-01-01 RX ADMIN — OXYCODONE HYDROCHLORIDE 10 MG: 10 TABLET ORAL at 09:58

## 2021-01-01 RX ADMIN — POTASSIUM CHLORIDE 40 MEQ: 1500 TABLET, EXTENDED RELEASE ORAL at 16:01

## 2021-01-01 RX ADMIN — BUMETANIDE 1 MG: 1 TABLET ORAL at 17:37

## 2021-01-01 RX ADMIN — OXYCODONE HYDROCHLORIDE AND ACETAMINOPHEN 1 TABLET: 5; 325 TABLET ORAL at 16:24

## 2021-01-01 RX ADMIN — OXYCODONE HYDROCHLORIDE 5 MG: 5 TABLET ORAL at 22:06

## 2021-01-01 RX ADMIN — HYDROMORPHONE HYDROCHLORIDE 0.5 MG: 1 INJECTION, SOLUTION INTRAMUSCULAR; INTRAVENOUS; SUBCUTANEOUS at 15:29

## 2021-01-01 RX ADMIN — CEFEPIME HYDROCHLORIDE 1000 MG: 1 INJECTION, SOLUTION INTRAVENOUS at 11:13

## 2021-01-01 RX ADMIN — WARFARIN SODIUM 2.5 MG: 2.5 TABLET ORAL at 17:46

## 2021-01-01 RX ADMIN — POTASSIUM CHLORIDE 40 MEQ: 1500 TABLET, EXTENDED RELEASE ORAL at 11:59

## 2021-01-01 RX ADMIN — OXYCODONE HYDROCHLORIDE 5 MG: 5 TABLET ORAL at 15:16

## 2021-01-01 RX ADMIN — POTASSIUM CHLORIDE 20 MEQ: 20 TABLET, EXTENDED RELEASE ORAL at 16:18

## 2021-01-01 RX ADMIN — INSULIN LISPRO 2 UNITS: 100 INJECTION, SOLUTION INTRAVENOUS; SUBCUTANEOUS at 16:31

## 2021-01-01 RX ADMIN — PHYTONADIONE 5 MG: 10 INJECTION, EMULSION INTRAMUSCULAR; INTRAVENOUS; SUBCUTANEOUS at 13:39

## 2021-01-01 RX ADMIN — SODIUM CHLORIDE, SODIUM GLUCONATE, SODIUM ACETATE, POTASSIUM CHLORIDE, MAGNESIUM CHLORIDE, SODIUM PHOSPHATE, DIBASIC, AND POTASSIUM PHOSPHATE 75 ML/HR: .53; .5; .37; .037; .03; .012; .00082 INJECTION, SOLUTION INTRAVENOUS at 07:58

## 2021-01-01 RX ADMIN — PROPOFOL 30 MG: 10 INJECTION, EMULSION INTRAVENOUS at 23:58

## 2021-01-01 RX ADMIN — ACETAMINOPHEN 975 MG: 325 TABLET, FILM COATED ORAL at 21:59

## 2021-01-01 RX ADMIN — SENNOSIDES AND DOCUSATE SODIUM 2 TABLET: 50; 8.6 TABLET ORAL at 17:39

## 2021-01-01 RX ADMIN — ACETAMINOPHEN 975 MG: 325 TABLET, FILM COATED ORAL at 21:05

## 2021-01-01 RX ADMIN — DOCUSATE SODIUM 100 MG: 100 CAPSULE, LIQUID FILLED ORAL at 09:01

## 2021-01-01 RX ADMIN — Medication 2000 UNITS: at 08:23

## 2021-01-01 RX ADMIN — ALBUMIN (HUMAN) 25 G: 0.25 INJECTION, SOLUTION INTRAVENOUS at 23:52

## 2021-01-01 RX ADMIN — ACETAMINOPHEN 650 MG: 325 TABLET, FILM COATED ORAL at 10:55

## 2021-01-01 RX ADMIN — PANTOPRAZOLE SODIUM 40 MG: 40 TABLET, DELAYED RELEASE ORAL at 06:34

## 2021-01-01 RX ADMIN — CEFEPIME HYDROCHLORIDE 2000 MG: 2 INJECTION, SOLUTION INTRAVENOUS at 10:31

## 2021-01-01 RX ADMIN — PANTOPRAZOLE SODIUM 40 MG: 40 TABLET, DELAYED RELEASE ORAL at 06:30

## 2021-01-01 RX ADMIN — HEPARIN SODIUM 7500 UNITS: 5000 INJECTION INTRAVENOUS; SUBCUTANEOUS at 22:00

## 2021-01-01 RX ADMIN — MIDODRINE HYDROCHLORIDE 10 MG: 5 TABLET ORAL at 11:02

## 2021-01-01 RX ADMIN — MIDODRINE HYDROCHLORIDE 5 MG: 5 TABLET ORAL at 08:32

## 2021-01-01 RX ADMIN — OXYCODONE HYDROCHLORIDE AND ACETAMINOPHEN 1 TABLET: 5; 325 TABLET ORAL at 05:45

## 2021-01-01 RX ADMIN — ACETAMINOPHEN 650 MG: 325 TABLET, FILM COATED ORAL at 23:48

## 2021-01-01 RX ADMIN — METOPROLOL TARTRATE 50 MG: 50 TABLET, FILM COATED ORAL at 08:57

## 2021-01-01 RX ADMIN — FUROSEMIDE 80 MG: 80 TABLET ORAL at 08:44

## 2021-01-01 RX ADMIN — GABAPENTIN 300 MG: 300 CAPSULE ORAL at 08:49

## 2021-01-01 RX ADMIN — DOCUSATE SODIUM AND SENNOSIDES 1 TABLET: 8.6; 5 TABLET ORAL at 08:49

## 2021-01-01 RX ADMIN — MIDODRINE HYDROCHLORIDE 15 MG: 5 TABLET ORAL at 11:24

## 2021-01-01 RX ADMIN — DOCUSATE SODIUM 100 MG: 100 CAPSULE, LIQUID FILLED ORAL at 08:00

## 2021-01-01 RX ADMIN — POTASSIUM CHLORIDE 20 MEQ: 20 TABLET, EXTENDED RELEASE ORAL at 11:37

## 2021-01-01 RX ADMIN — POTASSIUM CHLORIDE 20 MEQ: 1500 TABLET, EXTENDED RELEASE ORAL at 18:24

## 2021-01-01 RX ADMIN — HYDROMORPHONE HYDROCHLORIDE 0.2 MG: 0.2 INJECTION, SOLUTION INTRAMUSCULAR; INTRAVENOUS; SUBCUTANEOUS at 02:48

## 2021-01-01 RX ADMIN — ALLOPURINOL 100 MG: 100 TABLET ORAL at 08:59

## 2021-01-01 RX ADMIN — MIDODRINE HYDROCHLORIDE 10 MG: 5 TABLET ORAL at 06:30

## 2021-01-01 RX ADMIN — ALBUMIN (HUMAN) 25 G: 0.25 INJECTION, SOLUTION INTRAVENOUS at 20:15

## 2021-01-01 RX ADMIN — MIDODRINE HYDROCHLORIDE 5 MG: 5 TABLET ORAL at 17:40

## 2021-01-01 RX ADMIN — POTASSIUM CHLORIDE 20 MEQ: 1500 TABLET, EXTENDED RELEASE ORAL at 17:24

## 2021-01-01 RX ADMIN — SODIUM CHLORIDE 1000 ML: 0.9 INJECTION, SOLUTION INTRAVENOUS at 01:28

## 2021-01-01 RX ADMIN — HYDROMORPHONE HYDROCHLORIDE 0.5 MG: 1 INJECTION, SOLUTION INTRAMUSCULAR; INTRAVENOUS; SUBCUTANEOUS at 21:34

## 2021-01-01 RX ADMIN — PANTOPRAZOLE SODIUM 40 MG: 40 TABLET, DELAYED RELEASE ORAL at 06:28

## 2021-01-01 RX ADMIN — METOPROLOL TARTRATE 37.5 MG: 25 TABLET, FILM COATED ORAL at 20:29

## 2021-01-01 RX ADMIN — INSULIN LISPRO 1 UNITS: 100 INJECTION, SOLUTION INTRAVENOUS; SUBCUTANEOUS at 11:40

## 2021-01-01 RX ADMIN — OXYCODONE HYDROCHLORIDE 10 MG: 10 TABLET ORAL at 05:25

## 2021-01-01 RX ADMIN — OXYCODONE HYDROCHLORIDE 5 MG: 5 TABLET ORAL at 22:04

## 2021-01-01 RX ADMIN — FUROSEMIDE 60 MG: 10 INJECTION, SOLUTION INTRAMUSCULAR; INTRAVENOUS at 08:23

## 2021-01-01 RX ADMIN — ACETAMINOPHEN 650 MG: 325 TABLET, FILM COATED ORAL at 12:06

## 2021-01-01 RX ADMIN — LIDOCAINE HYDROCHLORIDE 10 ML: 10 INJECTION, SOLUTION EPIDURAL; INFILTRATION; INTRACAUDAL; PERINEURAL at 09:59

## 2021-01-01 RX ADMIN — MIDODRINE HYDROCHLORIDE 7.5 MG: 5 TABLET ORAL at 11:31

## 2021-01-01 RX ADMIN — OXYCODONE HYDROCHLORIDE 5 MG: 5 TABLET ORAL at 17:17

## 2021-01-01 RX ADMIN — METOPROLOL TARTRATE 50 MG: 50 TABLET, FILM COATED ORAL at 21:44

## 2021-01-01 RX ADMIN — OXYCODONE HYDROCHLORIDE 5 MG: 5 TABLET ORAL at 17:10

## 2021-01-01 RX ADMIN — OXYCODONE HYDROCHLORIDE 5 MG: 5 TABLET ORAL at 06:14

## 2021-01-01 RX ADMIN — BISACODYL 10 MG: 5 TABLET, COATED ORAL at 08:49

## 2021-01-01 RX ADMIN — ALBUMIN (HUMAN) 25 G: 0.25 INJECTION, SOLUTION INTRAVENOUS at 11:56

## 2021-01-01 RX ADMIN — MIDODRINE HYDROCHLORIDE 5 MG: 5 TABLET ORAL at 04:45

## 2021-01-01 RX ADMIN — SENNOSIDES AND DOCUSATE SODIUM 2 TABLET: 50; 8.6 TABLET ORAL at 08:06

## 2021-01-01 RX ADMIN — ACETAMINOPHEN 975 MG: 325 TABLET, FILM COATED ORAL at 14:15

## 2021-01-01 RX ADMIN — METOPROLOL TARTRATE 50 MG: 50 TABLET, FILM COATED ORAL at 08:12

## 2021-01-01 RX ADMIN — FERROUS SULFATE TAB 325 MG (65 MG ELEMENTAL FE) 325 MG: 325 (65 FE) TAB at 08:25

## 2021-01-01 RX ADMIN — Medication 10 MG: at 22:40

## 2021-01-01 RX ADMIN — OXYCODONE HYDROCHLORIDE 5 MG: 5 TABLET ORAL at 20:12

## 2021-01-01 RX ADMIN — HEPARIN SODIUM 7500 UNITS: 5000 INJECTION INTRAVENOUS; SUBCUTANEOUS at 21:14

## 2021-01-01 RX ADMIN — BISACODYL 10 MG: 5 TABLET, COATED ORAL at 17:17

## 2021-01-01 RX ADMIN — POTASSIUM CHLORIDE 20 MEQ: 1500 TABLET, EXTENDED RELEASE ORAL at 08:31

## 2021-01-01 RX ADMIN — POTASSIUM CHLORIDE 40 MEQ: 1500 TABLET, EXTENDED RELEASE ORAL at 17:44

## 2021-01-01 RX ADMIN — MENTHOL, UNSPECIFIED FORM AND METHYL SALICYLATE: 10; 150 CREAM TOPICAL at 15:27

## 2021-01-01 RX ADMIN — OXYCODONE HYDROCHLORIDE 5 MG: 5 TABLET ORAL at 04:29

## 2021-01-01 RX ADMIN — VANCOMYCIN HYDROCHLORIDE 2000 MG: 5 INJECTION, POWDER, LYOPHILIZED, FOR SOLUTION INTRAVENOUS at 12:56

## 2021-01-01 RX ADMIN — HEPARIN SODIUM 5000 UNITS: 5000 INJECTION INTRAVENOUS; SUBCUTANEOUS at 15:06

## 2021-01-01 RX ADMIN — SENNOSIDES AND DOCUSATE SODIUM 2 TABLET: 50; 8.6 TABLET ORAL at 18:34

## 2021-01-01 RX ADMIN — Medication 10 MG/HR: at 06:29

## 2021-01-01 RX ADMIN — CARVEDILOL 6.25 MG: 3.12 TABLET, FILM COATED ORAL at 09:01

## 2021-01-01 RX ADMIN — HEPARIN SODIUM 7500 UNITS: 5000 INJECTION INTRAVENOUS; SUBCUTANEOUS at 22:03

## 2021-01-01 RX ADMIN — POTASSIUM CHLORIDE 40 MEQ: 1500 TABLET, EXTENDED RELEASE ORAL at 16:06

## 2021-01-01 RX ADMIN — Medication 10 MG/HR: at 08:57

## 2021-01-01 RX ADMIN — MIDODRINE HYDROCHLORIDE 10 MG: 5 TABLET ORAL at 19:30

## 2021-01-01 RX ADMIN — PHENYLEPHRINE HYDROCHLORIDE 50 MCG/MIN: 10 INJECTION INTRAVENOUS at 14:13

## 2021-01-01 RX ADMIN — BISACODYL 10 MG: 5 TABLET, COATED ORAL at 09:47

## 2021-01-01 RX ADMIN — DOCUSATE SODIUM 100 MG: 100 CAPSULE, LIQUID FILLED ORAL at 18:07

## 2021-01-01 RX ADMIN — POTASSIUM CHLORIDE 20 MEQ: 20 TABLET, EXTENDED RELEASE ORAL at 08:23

## 2021-01-01 RX ADMIN — ROCURONIUM BROMIDE 30 MG: 50 INJECTION, SOLUTION INTRAVENOUS at 13:48

## 2021-01-01 RX ADMIN — PHYTONADIONE 5 MG: 5 TABLET ORAL at 11:41

## 2021-01-01 RX ADMIN — OXYCODONE HYDROCHLORIDE 5 MG: 5 TABLET ORAL at 20:19

## 2021-01-01 RX ADMIN — HYDROMORPHONE HYDROCHLORIDE 0.5 MG: 1 INJECTION, SOLUTION INTRAMUSCULAR; INTRAVENOUS; SUBCUTANEOUS at 11:41

## 2021-01-01 RX ADMIN — MIDODRINE HYDROCHLORIDE 15 MG: 5 TABLET ORAL at 04:36

## 2021-01-01 RX ADMIN — WARFARIN SODIUM 10 MG: 10 TABLET ORAL at 17:49

## 2021-01-01 RX ADMIN — Medication 140 MG: at 13:37

## 2021-01-01 RX ADMIN — FERROUS SULFATE TAB 325 MG (65 MG ELEMENTAL FE) 325 MG: 325 (65 FE) TAB at 08:37

## 2021-01-01 RX ADMIN — DIGOXIN 250 MCG: 250 TABLET ORAL at 08:23

## 2021-01-01 RX ADMIN — POTASSIUM CHLORIDE 20 MEQ: 1500 TABLET, EXTENDED RELEASE ORAL at 18:10

## 2021-01-01 RX ADMIN — HEPARIN SODIUM 7500 UNITS: 5000 INJECTION INTRAVENOUS; SUBCUTANEOUS at 05:25

## 2021-01-01 RX ADMIN — METOPROLOL SUCCINATE 25 MG: 25 TABLET, FILM COATED, EXTENDED RELEASE ORAL at 08:20

## 2021-01-01 RX ADMIN — BISACODYL 10 MG: 5 TABLET, COATED ORAL at 18:07

## 2021-01-01 RX ADMIN — POTASSIUM CHLORIDE 40 MEQ: 1500 TABLET, EXTENDED RELEASE ORAL at 10:51

## 2021-01-01 RX ADMIN — SENNOSIDES AND DOCUSATE SODIUM 2 TABLET: 50; 8.6 TABLET ORAL at 18:10

## 2021-01-01 RX ADMIN — FUROSEMIDE 40 MG: 10 INJECTION, SOLUTION INTRAVENOUS at 05:39

## 2021-01-01 RX ADMIN — DOCUSATE SODIUM 100 MG: 100 CAPSULE, LIQUID FILLED ORAL at 08:33

## 2021-01-01 RX ADMIN — PROPOFOL 30 MG: 10 INJECTION, EMULSION INTRAVENOUS at 00:29

## 2021-01-01 RX ADMIN — NICOTINE 7 MG/24 HR DAILY TRANSDERMAL PATCH 1 PATCH: at 10:30

## 2021-01-01 RX ADMIN — FUROSEMIDE 40 MG: 40 TABLET ORAL at 15:54

## 2021-01-01 RX ADMIN — INSULIN LISPRO 1 UNITS: 100 INJECTION, SOLUTION INTRAVENOUS; SUBCUTANEOUS at 17:31

## 2021-01-01 RX ADMIN — Medication 2000 UNITS: at 09:04

## 2021-01-01 RX ADMIN — ALBUMIN (HUMAN): 12.5 INJECTION, SOLUTION INTRAVENOUS at 00:26

## 2021-01-01 RX ADMIN — METOPROLOL TARTRATE 37.5 MG: 25 TABLET, FILM COATED ORAL at 21:33

## 2021-01-01 RX ADMIN — GABAPENTIN 300 MG: 300 CAPSULE ORAL at 16:11

## 2021-01-01 RX ADMIN — ALBUMIN (HUMAN) 25 G: 0.25 INJECTION, SOLUTION INTRAVENOUS at 09:02

## 2021-01-01 RX ADMIN — ACETAMINOPHEN 650 MG: 325 TABLET, FILM COATED ORAL at 06:08

## 2021-01-01 RX ADMIN — FUROSEMIDE 80 MG: 10 INJECTION, SOLUTION INTRAMUSCULAR; INTRAVENOUS at 08:12

## 2021-01-01 RX ADMIN — POTASSIUM CHLORIDE 20 MEQ: 20 TABLET, EXTENDED RELEASE ORAL at 17:20

## 2021-01-01 RX ADMIN — ALBUMIN (HUMAN) 25 G: 0.25 INJECTION, SOLUTION INTRAVENOUS at 21:02

## 2021-01-01 RX ADMIN — OXYCODONE HYDROCHLORIDE 5 MG: 5 TABLET ORAL at 04:16

## 2021-01-01 RX ADMIN — CARVEDILOL 6.25 MG: 3.12 TABLET, FILM COATED ORAL at 08:21

## 2021-01-01 RX ADMIN — ACETAMINOPHEN 975 MG: 325 TABLET, FILM COATED ORAL at 21:58

## 2021-01-01 RX ADMIN — POTASSIUM CHLORIDE 40 MEQ: 1500 TABLET, EXTENDED RELEASE ORAL at 08:31

## 2021-01-01 RX ADMIN — ROCURONIUM BROMIDE 20 MG: 50 INJECTION, SOLUTION INTRAVENOUS at 15:50

## 2021-01-01 RX ADMIN — MIDODRINE HYDROCHLORIDE 10 MG: 5 TABLET ORAL at 06:54

## 2021-01-01 RX ADMIN — WARFARIN SODIUM 5 MG: 5 TABLET ORAL at 18:34

## 2021-01-01 RX ADMIN — DOCUSATE SODIUM 100 MG: 100 CAPSULE, LIQUID FILLED ORAL at 08:36

## 2021-01-01 RX ADMIN — FAMOTIDINE 20 MG: 20 TABLET ORAL at 21:55

## 2021-01-01 RX ADMIN — DILTIAZEM HYDROCHLORIDE 7.5 MG/HR: 5 INJECTION INTRAVENOUS at 09:03

## 2021-01-01 RX ADMIN — ACETAMINOPHEN 975 MG: 325 TABLET, FILM COATED ORAL at 05:20

## 2021-01-01 RX ADMIN — ACETAMINOPHEN 650 MG: 325 TABLET, FILM COATED ORAL at 05:49

## 2021-01-01 RX ADMIN — FUROSEMIDE 40 MG: 40 TABLET ORAL at 12:05

## 2021-01-01 RX ADMIN — Medication 1 PATCH: at 09:17

## 2021-01-01 RX ADMIN — OXYCODONE HYDROCHLORIDE 10 MG: 10 TABLET ORAL at 14:14

## 2021-01-01 RX ADMIN — MIDODRINE HYDROCHLORIDE 10 MG: 5 TABLET ORAL at 06:34

## 2021-01-01 RX ADMIN — HYDROMORPHONE HYDROCHLORIDE 0.2 MG: 0.2 INJECTION, SOLUTION INTRAMUSCULAR; INTRAVENOUS; SUBCUTANEOUS at 06:54

## 2021-01-01 RX ADMIN — MIDODRINE HYDROCHLORIDE 10 MG: 5 TABLET ORAL at 16:25

## 2021-01-01 RX ADMIN — METOPROLOL TARTRATE 50 MG: 50 TABLET, FILM COATED ORAL at 08:44

## 2021-01-01 RX ADMIN — MIDODRINE HYDROCHLORIDE 15 MG: 5 TABLET ORAL at 21:05

## 2021-01-01 RX ADMIN — ALBUMIN (HUMAN): 12.5 INJECTION, SOLUTION INTRAVENOUS at 14:13

## 2021-01-01 RX ADMIN — DIGOXIN 250 MCG: 250 TABLET ORAL at 09:04

## 2021-01-01 RX ADMIN — POTASSIUM CHLORIDE 40 MEQ: 1500 TABLET, EXTENDED RELEASE ORAL at 14:03

## 2021-01-01 RX ADMIN — MIDODRINE HYDROCHLORIDE 5 MG: 5 TABLET ORAL at 15:05

## 2021-01-01 RX ADMIN — CARVEDILOL 6.25 MG: 3.12 TABLET, FILM COATED ORAL at 08:37

## 2021-01-01 RX ADMIN — HYDROMORPHONE HYDROCHLORIDE 0.5 MG: 1 INJECTION, SOLUTION INTRAMUSCULAR; INTRAVENOUS; SUBCUTANEOUS at 01:12

## 2021-01-01 RX ADMIN — Medication 2000 UNITS: at 08:53

## 2021-01-01 RX ADMIN — GABAPENTIN 300 MG: 300 CAPSULE ORAL at 08:27

## 2021-01-01 RX ADMIN — CEFEPIME HYDROCHLORIDE 1000 MG: 1 INJECTION, SOLUTION INTRAVENOUS at 08:23

## 2021-01-01 RX ADMIN — FERROUS SULFATE TAB 325 MG (65 MG ELEMENTAL FE) 325 MG: 325 (65 FE) TAB at 09:11

## 2021-01-01 RX ADMIN — CARVEDILOL 12.5 MG: 12.5 TABLET, FILM COATED ORAL at 08:27

## 2021-01-01 RX ADMIN — POTASSIUM CHLORIDE 20 MEQ: 20 TABLET, EXTENDED RELEASE ORAL at 11:50

## 2021-01-01 RX ADMIN — FUROSEMIDE 80 MG: 10 INJECTION, SOLUTION INTRAMUSCULAR; INTRAVENOUS at 15:05

## 2021-01-01 RX ADMIN — DILTIAZEM HYDROCHLORIDE 15 MG: 5 INJECTION INTRAVENOUS at 10:18

## 2021-01-01 RX ADMIN — ACETAMINOPHEN 975 MG: 325 TABLET, FILM COATED ORAL at 21:51

## 2021-01-01 RX ADMIN — DOCUSATE SODIUM AND SENNOSIDES 1 TABLET: 8.6; 5 TABLET ORAL at 09:17

## 2021-01-01 RX ADMIN — ACETAMINOPHEN 975 MG: 325 TABLET, FILM COATED ORAL at 05:23

## 2021-01-01 RX ADMIN — CARVEDILOL 6.25 MG: 6.25 TABLET, FILM COATED ORAL at 01:50

## 2021-01-01 RX ADMIN — CARVEDILOL 6.25 MG: 3.12 TABLET, FILM COATED ORAL at 17:42

## 2021-01-01 RX ADMIN — FUROSEMIDE 40 MG: 10 INJECTION, SOLUTION INTRAVENOUS at 11:37

## 2021-01-01 RX ADMIN — SODIUM CHLORIDE 20 ML/HR: 0.9 INJECTION, SOLUTION INTRAVENOUS at 11:30

## 2021-01-01 RX ADMIN — MIDODRINE HYDROCHLORIDE 10 MG: 5 TABLET ORAL at 10:43

## 2021-01-01 RX ADMIN — DOCUSATE SODIUM 100 MG: 100 CAPSULE, LIQUID FILLED ORAL at 08:41

## 2021-01-01 RX ADMIN — ACETAZOLAMIDE 250 MG: 250 TABLET ORAL at 20:51

## 2021-01-01 RX ADMIN — SODIUM POLYSTYRENE SULFONATE 15 G: 4.1 POWDER, FOR SUSPENSION ORAL; RECTAL at 10:40

## 2021-01-01 RX ADMIN — CEFEPIME HYDROCHLORIDE 2000 MG: 2 INJECTION, SOLUTION INTRAVENOUS at 09:11

## 2021-01-01 RX ADMIN — HYDROMORPHONE HYDROCHLORIDE 0.5 MG: 1 INJECTION, SOLUTION INTRAMUSCULAR; INTRAVENOUS; SUBCUTANEOUS at 13:38

## 2021-01-01 RX ADMIN — OXYCODONE HYDROCHLORIDE 10 MG: 10 TABLET ORAL at 21:09

## 2021-01-01 RX ADMIN — DOCUSATE SODIUM AND SENNOSIDES 1 TABLET: 8.6; 5 TABLET ORAL at 22:07

## 2021-01-01 RX ADMIN — WARFARIN SODIUM 2.5 MG: 2.5 TABLET ORAL at 17:45

## 2021-01-01 RX ADMIN — MIDAZOLAM 1 MG: 1 INJECTION INTRAMUSCULAR; INTRAVENOUS at 22:01

## 2021-01-01 RX ADMIN — PANTOPRAZOLE SODIUM 40 MG: 40 TABLET, DELAYED RELEASE ORAL at 05:52

## 2021-01-01 RX ADMIN — OXYCODONE HYDROCHLORIDE 5 MG: 5 TABLET ORAL at 05:56

## 2021-01-01 RX ADMIN — PANTOPRAZOLE SODIUM 40 MG: 40 TABLET, DELAYED RELEASE ORAL at 05:45

## 2021-01-01 RX ADMIN — ACETAMINOPHEN 975 MG: 325 TABLET, FILM COATED ORAL at 15:30

## 2021-01-01 RX ADMIN — BUMETANIDE 1 MG: 0.25 INJECTION, SOLUTION INTRAMUSCULAR; INTRAVENOUS at 06:08

## 2021-01-01 RX ADMIN — MIDODRINE HYDROCHLORIDE 10 MG: 5 TABLET ORAL at 06:27

## 2021-01-01 RX ADMIN — SODIUM CHLORIDE, SODIUM LACTATE, POTASSIUM CHLORIDE, AND CALCIUM CHLORIDE: .6; .31; .03; .02 INJECTION, SOLUTION INTRAVENOUS at 13:25

## 2021-01-01 RX ADMIN — SODIUM CHLORIDE, SODIUM GLUCONATE, SODIUM ACETATE, POTASSIUM CHLORIDE, MAGNESIUM CHLORIDE, SODIUM PHOSPHATE, DIBASIC, AND POTASSIUM PHOSPHATE 75 ML/HR: .53; .5; .37; .037; .03; .012; .00082 INJECTION, SOLUTION INTRAVENOUS at 17:35

## 2021-01-01 RX ADMIN — BISACODYL 10 MG: 5 TABLET, COATED ORAL at 18:17

## 2021-01-01 RX ADMIN — DOCUSATE SODIUM AND SENNOSIDES 1 TABLET: 8.6; 5 TABLET ORAL at 20:48

## 2021-01-01 RX ADMIN — OXYCODONE HYDROCHLORIDE 10 MG: 10 TABLET ORAL at 12:44

## 2021-01-01 RX ADMIN — BISACODYL 10 MG: 5 TABLET, COATED ORAL at 18:00

## 2021-01-01 RX ADMIN — METOPROLOL TARTRATE 50 MG: 50 TABLET, FILM COATED ORAL at 22:06

## 2021-01-01 RX ADMIN — ACETAMINOPHEN 650 MG: 325 TABLET, FILM COATED ORAL at 17:34

## 2021-01-01 RX ADMIN — DIGOXIN 250 MCG: 250 TABLET ORAL at 08:30

## 2021-01-01 RX ADMIN — ACETAMINOPHEN 650 MG: 325 TABLET, FILM COATED ORAL at 11:41

## 2021-01-01 RX ADMIN — WARFARIN SODIUM 5 MG: 5 TABLET ORAL at 17:14

## 2021-01-01 RX ADMIN — MIDODRINE HYDROCHLORIDE 5 MG: 5 TABLET ORAL at 06:08

## 2021-01-01 RX ADMIN — CARVEDILOL 6.25 MG: 3.12 TABLET, FILM COATED ORAL at 08:09

## 2021-01-01 RX ADMIN — METOPROLOL TARTRATE 50 MG: 50 TABLET, FILM COATED ORAL at 08:23

## 2021-01-05 ENCOUNTER — LAB (OUTPATIENT)
Dept: LAB | Facility: HOSPITAL | Age: 61
End: 2021-01-05
Attending: FAMILY MEDICINE
Payer: COMMERCIAL

## 2021-01-05 ENCOUNTER — ANTICOAG VISIT (OUTPATIENT)
Dept: CARDIOLOGY CLINIC | Facility: CLINIC | Age: 61
End: 2021-01-05
Payer: COMMERCIAL

## 2021-01-05 DIAGNOSIS — Z79.01 LONG TERM (CURRENT) USE OF ANTICOAGULANTS: ICD-10-CM

## 2021-01-05 DIAGNOSIS — K74.69 OTHER CIRRHOSIS OF LIVER (HCC): ICD-10-CM

## 2021-01-05 DIAGNOSIS — I48.19 PERSISTENT ATRIAL FIBRILLATION (HCC): ICD-10-CM

## 2021-01-05 DIAGNOSIS — Z11.59 ENCOUNTER FOR SCREENING FOR OTHER VIRAL DISEASES: ICD-10-CM

## 2021-01-05 DIAGNOSIS — E11.69 TYPE 2 DIABETES MELLITUS WITH OTHER SPECIFIED COMPLICATION, WITHOUT LONG-TERM CURRENT USE OF INSULIN (HCC): ICD-10-CM

## 2021-01-05 LAB
AFP-TM SERPL-MCNC: 4.3 NG/ML (ref 0.5–8)
ALBUMIN SERPL BCP-MCNC: 2.8 G/DL (ref 3.5–5)
ALP SERPL-CCNC: 135 U/L (ref 46–116)
ALT SERPL W P-5'-P-CCNC: 19 U/L (ref 12–78)
ANION GAP SERPL CALCULATED.3IONS-SCNC: 2 MMOL/L (ref 4–13)
ANION GAP SERPL CALCULATED.3IONS-SCNC: 3 MMOL/L (ref 4–13)
AST SERPL W P-5'-P-CCNC: 23 U/L (ref 5–45)
BASOPHILS # BLD AUTO: 0.06 THOUSANDS/ΜL (ref 0–0.1)
BASOPHILS NFR BLD AUTO: 1 % (ref 0–1)
BILIRUB SERPL-MCNC: 1 MG/DL (ref 0.2–1)
BUN SERPL-MCNC: 16 MG/DL (ref 5–25)
BUN SERPL-MCNC: 16 MG/DL (ref 5–25)
CALCIUM ALBUM COR SERPL-MCNC: 9.5 MG/DL (ref 8.3–10.1)
CALCIUM SERPL-MCNC: 8.5 MG/DL (ref 8.3–10.1)
CALCIUM SERPL-MCNC: 8.6 MG/DL (ref 8.3–10.1)
CHLORIDE SERPL-SCNC: 102 MMOL/L (ref 100–108)
CHLORIDE SERPL-SCNC: 102 MMOL/L (ref 100–108)
CO2 SERPL-SCNC: 34 MMOL/L (ref 21–32)
CO2 SERPL-SCNC: 34 MMOL/L (ref 21–32)
CREAT SERPL-MCNC: 1.24 MG/DL (ref 0.6–1.3)
CREAT SERPL-MCNC: 1.28 MG/DL (ref 0.6–1.3)
DIGOXIN SERPL-MCNC: 1.3 NG/ML (ref 0.8–2)
EOSINOPHIL # BLD AUTO: 0.3 THOUSAND/ΜL (ref 0–0.61)
EOSINOPHIL NFR BLD AUTO: 5 % (ref 0–6)
ERYTHROCYTE [DISTWIDTH] IN BLOOD BY AUTOMATED COUNT: 15.3 % (ref 11.6–15.1)
EST. AVERAGE GLUCOSE BLD GHB EST-MCNC: 128 MG/DL
FERRITIN SERPL-MCNC: 52 NG/ML (ref 8–388)
GFR SERPL CREATININE-BSD FRML MDRD: 60 ML/MIN/1.73SQ M
GFR SERPL CREATININE-BSD FRML MDRD: 62 ML/MIN/1.73SQ M
GLUCOSE P FAST SERPL-MCNC: 130 MG/DL (ref 65–99)
GLUCOSE P FAST SERPL-MCNC: 130 MG/DL (ref 65–99)
HBA1C MFR BLD: 6.1 %
HBV CORE AB SER QL: NORMAL
HBV CORE IGM SER QL: NORMAL
HBV SURFACE AG SER QL: NORMAL
HCT VFR BLD AUTO: 31.8 % (ref 36.5–49.3)
HCV AB SER QL: NORMAL
HGB BLD-MCNC: 9.3 G/DL (ref 12–17)
IGA SERPL-MCNC: 584 MG/DL (ref 70–400)
IMM GRANULOCYTES # BLD AUTO: 0.02 THOUSAND/UL (ref 0–0.2)
IMM GRANULOCYTES NFR BLD AUTO: 0 % (ref 0–2)
INR PPP: 2.03 (ref 0.84–1.19)
IRON SATN MFR SERPL: 12 %
IRON SERPL-MCNC: 51 UG/DL (ref 65–175)
LYMPHOCYTES # BLD AUTO: 0.74 THOUSANDS/ΜL (ref 0.6–4.47)
LYMPHOCYTES NFR BLD AUTO: 13 % (ref 14–44)
MAGNESIUM SERPL-MCNC: 2.2 MG/DL (ref 1.6–2.6)
MCH RBC QN AUTO: 27.4 PG (ref 26.8–34.3)
MCHC RBC AUTO-ENTMCNC: 29.2 G/DL (ref 31.4–37.4)
MCV RBC AUTO: 94 FL (ref 82–98)
MONOCYTES # BLD AUTO: 0.5 THOUSAND/ΜL (ref 0.17–1.22)
MONOCYTES NFR BLD AUTO: 9 % (ref 4–12)
NEUTROPHILS # BLD AUTO: 4.16 THOUSANDS/ΜL (ref 1.85–7.62)
NEUTS SEG NFR BLD AUTO: 72 % (ref 43–75)
NRBC BLD AUTO-RTO: 0 /100 WBCS
PLATELET # BLD AUTO: 174 THOUSANDS/UL (ref 149–390)
PMV BLD AUTO: 10.5 FL (ref 8.9–12.7)
POTASSIUM SERPL-SCNC: 3.5 MMOL/L (ref 3.5–5.3)
POTASSIUM SERPL-SCNC: 3.5 MMOL/L (ref 3.5–5.3)
PROT SERPL-MCNC: 7.4 G/DL (ref 6.4–8.2)
PROTHROMBIN TIME: 22.5 SECONDS (ref 11.6–14.5)
RBC # BLD AUTO: 3.4 MILLION/UL (ref 3.88–5.62)
SODIUM SERPL-SCNC: 138 MMOL/L (ref 136–145)
SODIUM SERPL-SCNC: 139 MMOL/L (ref 136–145)
TIBC SERPL-MCNC: 409 UG/DL (ref 250–450)
WBC # BLD AUTO: 5.78 THOUSAND/UL (ref 4.31–10.16)

## 2021-01-05 PROCEDURE — 83550 IRON BINDING TEST: CPT

## 2021-01-05 PROCEDURE — 86803 HEPATITIS C AB TEST: CPT

## 2021-01-05 PROCEDURE — 86235 NUCLEAR ANTIGEN ANTIBODY: CPT

## 2021-01-05 PROCEDURE — 82728 ASSAY OF FERRITIN: CPT

## 2021-01-05 PROCEDURE — 36415 COLL VENOUS BLD VENIPUNCTURE: CPT | Performed by: INTERNAL MEDICINE

## 2021-01-05 PROCEDURE — 93793 ANTICOAG MGMT PT WARFARIN: CPT | Performed by: PHYSICIAN ASSISTANT

## 2021-01-05 PROCEDURE — 80048 BASIC METABOLIC PNL TOTAL CA: CPT | Performed by: INTERNAL MEDICINE

## 2021-01-05 PROCEDURE — 3044F HG A1C LEVEL LT 7.0%: CPT | Performed by: INTERNAL MEDICINE

## 2021-01-05 PROCEDURE — 83036 HEMOGLOBIN GLYCOSYLATED A1C: CPT

## 2021-01-05 PROCEDURE — 82105 ALPHA-FETOPROTEIN SERUM: CPT

## 2021-01-05 PROCEDURE — 80162 ASSAY OF DIGOXIN TOTAL: CPT | Performed by: INTERNAL MEDICINE

## 2021-01-05 PROCEDURE — 83735 ASSAY OF MAGNESIUM: CPT | Performed by: INTERNAL MEDICINE

## 2021-01-05 PROCEDURE — 87340 HEPATITIS B SURFACE AG IA: CPT

## 2021-01-05 PROCEDURE — 80053 COMPREHEN METABOLIC PANEL: CPT

## 2021-01-05 PROCEDURE — 83516 IMMUNOASSAY NONANTIBODY: CPT

## 2021-01-05 PROCEDURE — 83540 ASSAY OF IRON: CPT

## 2021-01-05 PROCEDURE — 85610 PROTHROMBIN TIME: CPT

## 2021-01-05 PROCEDURE — 85025 COMPLETE CBC W/AUTO DIFF WBC: CPT

## 2021-01-05 PROCEDURE — 86038 ANTINUCLEAR ANTIBODIES: CPT

## 2021-01-05 PROCEDURE — 86704 HEP B CORE ANTIBODY TOTAL: CPT

## 2021-01-05 PROCEDURE — 86256 FLUORESCENT ANTIBODY TITER: CPT

## 2021-01-05 PROCEDURE — 82784 ASSAY IGA/IGD/IGG/IGM EACH: CPT

## 2021-01-05 PROCEDURE — 86705 HEP B CORE ANTIBODY IGM: CPT

## 2021-01-05 NOTE — PATIENT INSTRUCTIONS
No changes in medication health or diet  No missed or extra doses  No s/s of bleeding TIA or CVA  No new ABX, NSAIDs OCT meds, or supplements  Dose as instructed and recheck in one month   ALEXIS Ellington PA-C

## 2021-01-06 LAB
ACTIN IGG SERPL-ACNC: 5 UNITS (ref 0–19)
MITOCHONDRIA M2 IGG SER-ACNC: <20 UNITS (ref 0–20)
RYE IGE QN: NEGATIVE

## 2021-01-07 LAB — TTG IGA SER-ACNC: <2 U/ML (ref 0–3)

## 2021-01-10 ENCOUNTER — LAB (OUTPATIENT)
Dept: LAB | Facility: HOSPITAL | Age: 61
End: 2021-01-10
Attending: FAMILY MEDICINE
Payer: COMMERCIAL

## 2021-01-10 LAB
CREAT UR-MCNC: 152 MG/DL
MICROALBUMIN UR-MCNC: 46.2 MG/L (ref 0–20)
MICROALBUMIN/CREAT 24H UR: 30 MG/G CREATININE (ref 0–30)

## 2021-01-10 PROCEDURE — 3060F POS MICROALBUMINURIA REV: CPT | Performed by: INTERNAL MEDICINE

## 2021-01-10 PROCEDURE — 82570 ASSAY OF URINE CREATININE: CPT | Performed by: FAMILY MEDICINE

## 2021-01-10 PROCEDURE — 82043 UR ALBUMIN QUANTITATIVE: CPT | Performed by: FAMILY MEDICINE

## 2021-01-12 ENCOUNTER — OFFICE VISIT (OUTPATIENT)
Dept: FAMILY MEDICINE CLINIC | Facility: CLINIC | Age: 61
End: 2021-01-12
Payer: COMMERCIAL

## 2021-01-12 VITALS
TEMPERATURE: 97 F | DIASTOLIC BLOOD PRESSURE: 80 MMHG | WEIGHT: 315 LBS | BODY MASS INDEX: 44.1 KG/M2 | RESPIRATION RATE: 20 BRPM | SYSTOLIC BLOOD PRESSURE: 136 MMHG | HEIGHT: 71 IN | HEART RATE: 84 BPM

## 2021-01-12 DIAGNOSIS — I48.21 PERMANENT ATRIAL FIBRILLATION (HCC): ICD-10-CM

## 2021-01-12 DIAGNOSIS — I89.0 LYMPHEDEMA: ICD-10-CM

## 2021-01-12 DIAGNOSIS — E66.01 CLASS 3 SEVERE OBESITY DUE TO EXCESS CALORIES WITH SERIOUS COMORBIDITY AND BODY MASS INDEX (BMI) OF 45.0 TO 49.9 IN ADULT (HCC): ICD-10-CM

## 2021-01-12 DIAGNOSIS — I48.19 PERSISTENT ATRIAL FIBRILLATION (HCC): ICD-10-CM

## 2021-01-12 DIAGNOSIS — Z79.01 LONG TERM (CURRENT) USE OF ANTICOAGULANTS: ICD-10-CM

## 2021-01-12 DIAGNOSIS — G47.33 OSA (OBSTRUCTIVE SLEEP APNEA): ICD-10-CM

## 2021-01-12 DIAGNOSIS — I50.32 CHRONIC DIASTOLIC CONGESTIVE HEART FAILURE (HCC): ICD-10-CM

## 2021-01-12 DIAGNOSIS — E11.69 TYPE 2 DIABETES MELLITUS WITH OTHER SPECIFIED COMPLICATION, WITHOUT LONG-TERM CURRENT USE OF INSULIN (HCC): Primary | ICD-10-CM

## 2021-01-12 PROCEDURE — 99214 OFFICE O/P EST MOD 30 MIN: CPT | Performed by: FAMILY MEDICINE

## 2021-01-12 NOTE — PROGRESS NOTES
Assessment/Plan:  Diabetes mellitus type 2 has been off metformin since being diagnosed with ascites and cirrhosis  Hemoglobin A1c shows adequate control of his diabetes  Hemoglobin A1c is at 6 1    Atrial fibrillation with chronic diastolic heart failure sleep apnea and obesity  The patient has had a 20 lb weight gain since the last visit  The patient was advised to use Zaroxolyn prior to loop diuretic and to try and diurese off at least 10 lb  Morbid obesity diet discussed  Problem List Items Addressed This Visit     None           There are no diagnoses linked to this encounter  No problem-specific Assessment & Plan notes found for this encounter  PHQ-9 Depression Screening    PHQ-9:   Frequency of the following problems over the past two weeks: Body mass index is 49 65 kg/m²  BMI Counseling: Body mass index is 49 65 kg/m²  The BMI is above normal  Nutrition recommendations include reducing portion sizes, decreasing overall calorie intake, 3-5 servings of fruits/vegetables daily, reducing fast food intake, consuming healthier snacks, decreasing soda and/or juice intake, moderation in carbohydrate intake, increasing intake of lean protein, reducing intake of saturated fat and trans fat and reducing intake of cholesterol  Subjective:      Patient ID: Sujit Marks is a 63 y o  male      Patient presents with shortness of breath and a 20 lb weight gain diabetes mellitus atrial fibrillation congestive heart failure along with the cirrhosis      The following portions of the patient's history were reviewed and updated as appropriate:   He has a past medical history of A-fib [facility]), Cardiac disease, Chronic combined systolic (congestive) and diastolic (congestive) heart failure ([facility] ), Diabetes mellitus ([facility] ), Dilated cardiomyopathy ([facility] ), History of echocardiogram (11/24/2014), History of Lexiscan MPI (02/19/2016), echocardiogram (04/28/2017), recurrent pneumonia, Hypertension, Long term (current) use of anticoagulants, Morbid (severe) obesity due to excess calories ([address] ), and Neuropathy  ,  does not have any pertinent problems on file  ,   has a past surgical history that includes Hernia repair; Vascular surgery (Right); Splenectomy, total; and IR thoracentesis (8/25/2020)  ,  family history is not on file  ,   reports that he has been smoking cigarettes  He has been smoking about 0 25 packs per day  He has never used smokeless tobacco  He reports current alcohol use of about 14 0 standard drinks of alcohol per week  He reports that he does not use drugs  ,  has No Known Allergies     Current Outpatient Medications   Medication Sig Dispense Refill    carvedilol (COREG) 6 25 mg tablet Take 1 tablet (6 25 mg total) by mouth 2 (two) times a day with meals 180 tablet 3    digoxin (LANOXIN) 0 25 mg tablet Take 1 tablet (250 mcg total) by mouth daily 30 tablet 5    furosemide (LASIX) 40 mg tablet Take 1 tablet (40 mg total) by mouth daily 90 tablet 3    metolazone (ZAROXOLYN) 5 mg tablet TAKE 1 TABLET TWICE A WEEK 30 MINUTES BEFORE LASIX DOSE 15 tablet 5    warfarin (COUMADIN) 5 mg tablet Take 1/2 to 1 tablet by oral route daily  135 tablet 3    metFORMIN (GLUCOPHAGE) 1000 MG tablet TAKE 1 TABLET BY MOUTH TWICE A DAY (Patient not taking: Reported on 1/12/2021) 180 tablet 1    omeprazole (PriLOSEC) 40 MG capsule TAKE 1 CAPSULE BY MOUTH TWICE A DAY (Patient not taking: Reported on 1/12/2021) 180 capsule 0    zolpidem (AMBIEN) 10 mg tablet Take 1 tablet (10 mg total) by mouth as needed for sleep (Take 1 tablet 30 minutes prior to lights out on night of sleep study ) (Patient taking differently: Take 10 mg by mouth as needed for sleep (Take 1 tablet 30 minutes prior to lights out on night of sleep study 01/21/2021 ) ) 1 tablet 0     No current facility-administered medications for this visit  Review of Systems   Constitutional: Negative  Negative for chills and fever  HENT: Negative  Negative for ear pain and sore throat  Eyes: Negative  Negative for pain and visual disturbance  Respiratory: Positive for shortness of breath  Negative for cough  Cardiovascular: Positive for leg swelling  Negative for chest pain and palpitations  Gastrointestinal: Positive for abdominal distention  Negative for abdominal pain and vomiting  Endocrine: Negative  Genitourinary: Negative  Negative for dysuria and hematuria  Musculoskeletal: Negative  Negative for arthralgias and back pain  Skin: Negative  Negative for color change and rash  Allergic/Immunologic: Negative  Neurological: Negative  Negative for seizures and syncope  Hematological: Negative  Psychiatric/Behavioral: Negative  All other systems reviewed and are negative  Objective:    /80   Pulse 84   Temp (!) 97 °F (36 1 °C)   Resp 20   Ht 5' 11" (1 803 m)   Wt (!) 161 kg (356 lb)   BMI 49 65 kg/m²   Body mass index is 49 65 kg/m²  Physical Exam  Constitutional:       Appearance: He is obese  Cardiovascular:      Rate and Rhythm: Rhythm irregular  Abdominal:      General: There is distension  Musculoskeletal:      Right lower leg: Edema present  Left lower leg: Edema present

## 2021-01-13 ENCOUNTER — OFFICE VISIT (OUTPATIENT)
Dept: GASTROENTEROLOGY | Facility: CLINIC | Age: 61
End: 2021-01-13
Payer: COMMERCIAL

## 2021-01-13 VITALS
HEIGHT: 71 IN | SYSTOLIC BLOOD PRESSURE: 151 MMHG | HEART RATE: 83 BPM | WEIGHT: 315 LBS | BODY MASS INDEX: 44.1 KG/M2 | DIASTOLIC BLOOD PRESSURE: 79 MMHG | TEMPERATURE: 97.6 F

## 2021-01-13 DIAGNOSIS — K59.00 CONSTIPATION, UNSPECIFIED CONSTIPATION TYPE: ICD-10-CM

## 2021-01-13 DIAGNOSIS — E66.01 CLASS 3 SEVERE OBESITY DUE TO EXCESS CALORIES WITH SERIOUS COMORBIDITY AND BODY MASS INDEX (BMI) OF 40.0 TO 44.9 IN ADULT (HCC): ICD-10-CM

## 2021-01-13 DIAGNOSIS — D64.9 ANEMIA, UNSPECIFIED TYPE: ICD-10-CM

## 2021-01-13 DIAGNOSIS — K74.60 HEPATIC CIRRHOSIS, UNSPECIFIED HEPATIC CIRRHOSIS TYPE, UNSPECIFIED WHETHER ASCITES PRESENT (HCC): Primary | ICD-10-CM

## 2021-01-13 PROCEDURE — 3008F BODY MASS INDEX DOCD: CPT | Performed by: INTERNAL MEDICINE

## 2021-01-13 PROCEDURE — 3077F SYST BP >= 140 MM HG: CPT | Performed by: INTERNAL MEDICINE

## 2021-01-13 PROCEDURE — 3078F DIAST BP <80 MM HG: CPT | Performed by: INTERNAL MEDICINE

## 2021-01-13 PROCEDURE — 99214 OFFICE O/P EST MOD 30 MIN: CPT | Performed by: INTERNAL MEDICINE

## 2021-01-13 NOTE — PATIENT INSTRUCTIONS
Cirrhosis  - have blood drawn to check alpha 1 antitrypsin level   - stay on low salt diet (less than 2000 mg of sodium per day)  - repeat ultrasound in 6-12 months  - continue to avoid alcohol    Constipation  - continue fiber supplement (metamucil, citrucel, or benefiber) daily (over the counter, generic)  - start miralax 1 capful (17 g) daily regularly (over the counter, generic)    Anemia  - continue monitoring hemoglobin    Weight loss  - discussed lifestyle changes to reduce weight: decreasing carbohydrates/starchy foods, decreasing sugary drinks, limiting portion, and eating more fruits and veggies; encourage 30 minutes of exercise 5-6x per week       Follow up in 3-4 months

## 2021-01-13 NOTE — PROGRESS NOTES
[facility] - Outpatient Follow-up Note  Esequiel Riedel 63 y o  male MRN: [id number]  Encounter: [id number]          ASSESSMENT AND PLAN:      1  Cirrhosis  - etiology- NAFLD vs AFL  - MELD NA 18  - continue to avoid alcohol  - have blood drawn to check alpha 1 antitrypsin level   - EV screening- EGD 10/2020 without esophageal varices  - HE- none  - Ascites- no ascites on imaging   - significant LE edema and on diuretics   - stay on low salt diet (less than 2000 mg of sodium per day)  - HCC screening- RUQ US 12/2020 did not show liver masses, AFP 4 6 (1/2021);    - repeat ultrasound in 6-12 months  - HAV and HBV immune- discuss at future visit  - transplant evaluation- will need to lose weight     2  Constipation  - continue fiber supplement (metamucil, citrucel, or benefiber) daily  - start miralax 1 capful (17 g) daily regularly    3  Anemia  - continue monitoring hemoglobin  - iron sat low at 12  - has 2 colonoscopies in 2020 with AVM s/p APC  - may need push enteroscopy and colonoscopy to follow up for AVMs    4  Obesity  - discussed lifestyle changes to reduce weight    5  Co-morbidities: atrial fibrillation on coumadin, CHF, hypertension, diabetes, STEFF (getting work up for CPAP)    Follow up in 3-4 months    ______________________________________________________________________    SUBJECTIVE:  48-year-old male who presents to the follow-up for cirrhosis  Co-morbidities: atrial fibrillation on coumadin, CHF, hypertension, diabetes, STEFF (getting work up for CPAP)    Cirrhosis  - no clear etiology  - stopped drinking mid-December 2020  - use to drink 5-10 drinks per week   - liver chemistry normal except for mildly elevated alk-phos  -chronic hepatitis panel, anti smooth muscle antibody, AMA, KAY, iron panel negative  -right upper quadrant ultrasound noted enlarged liver with nodular contour possible underlying cirrhosis  Mildly pulsatile portal vein    Correlate clinically to exclude the right heart pressure  Cholelithiasis  Ascites  - no ascites noted on RUQ US  - reports significant LE edema     Constipation  - 1 BM every other  - takes OTC laxative every other day  - started fiber supplement daily    Denies confusion, no jaundice, no generalized pruritus  No GI bleeding  Prior endoscopies:   EGD October 2020-small hiatal hernia, irregular Z-line, normal stomach and duodenum  Colonoscopy October 2020-multiple small and large AVM status post APC, 1 3 mm ascending colon polyp removed via cold forceps, internal and external hemorrhoids  Pathology-lower esophagus with inflammation, 1 TIA    EGD June 2020- LA grade C esophagitis, possible Lee's esophagus, moderate gastric erythema and erosions of ascites, mild duodenal bulb erythema status post biopsies  Colonoscopy June 2020-polyp seen but not removed due to easy bleeding, multiple AVMs as CBC, internal hemorrhoids  Pathology-mild gastric inflammation, mild inflammation of the small bowel      REVIEW OF SYSTEMS IS OTHERWISE NEGATIVE  Historical Information   Past Medical History:   Diagnosis Date    A-fib [facility])     Cardiac disease     Chronic combined systolic (congestive) and diastolic (congestive) heart failure (HCC)     Diabetes mellitus ([facility] [address] )     Dilated cardiomyopathy ([facility] [address] [facility]     History of echocardiogram 11/24/2014    EF 0 30 (30%), Likely mod LV systolic dysfunction  Likely RV dysfunction as well   History of Lexiscan MPI 02/19/2016    EF 0 43 (43%), no prior MI or ischemia   Hx of echocardiogram 04/28/2017    Normal EF, Normal LV systolic function  Mild concentric LV hypertrophy  Mild mitral and tricuspid regurg      Hx: recurrent pneumonia     Hypertension     Long term (current) use of anticoagulants     Morbid (severe) obesity due to excess calories (HCC)     Neuropathy      Past Surgical History:   Procedure Laterality Date    HERNIA REPAIR      IR THORACENTESIS  8/25/2020    SPLENECTOMY, TOTAL      VASCULAR SURGERY Right     leg     Social History   Social History     Substance and Sexual Activity   Alcohol Use Yes    Alcohol/week: 14 0 standard drinks    Types: 5 Glasses of wine, 5 Cans of beer, 4 Shots of liquor per week    Frequency: 2-3 times a week    Drinks per session: 5 or 6    Binge frequency: Never    Comment: WEEKLY     Social History     Substance and Sexual Activity   Drug Use Never     Social History     Tobacco Use   Smoking Status Current Some Day Smoker    Packs/day: 0 25    Types: Cigarettes   Smokeless Tobacco Never Used   Tobacco Comment    2-3 CIGARETTES DAILY     History reviewed  No pertinent family history  Meds/Allergies       Current Outpatient Medications:     carvedilol (COREG) 6 25 mg tablet    digoxin (LANOXIN) 0 25 mg tablet    furosemide (LASIX) 40 mg tablet    metFORMIN (GLUCOPHAGE) 1000 MG tablet    metolazone (ZAROXOLYN) 5 mg tablet    omeprazole (PriLOSEC) 40 MG capsule    warfarin (COUMADIN) 5 mg tablet    zolpidem (AMBIEN) 10 mg tablet    No Known Allergies        Objective     Blood pressure 151/79, pulse 83, temperature 97 6 °F (36 4 °C), temperature source Tympanic, height 5' 11" (1 803 m), weight (!) 159 kg (351 lb 6 4 oz)  Body mass index is 49 01 kg/m²  PHYSICAL EXAM:      General Appearance:   Alert, cooperative, no distress   HEENT:   Normocephalic, atraumatic, anicteric  Neck:  Supple, symmetrical, trachea midline   Lungs:   Clear to auscultation bilaterally; no rales, rhonchi or wheezing; respirations unlabored    Heart[de-identified]   Regular rate and rhythm; no murmur, rub, or gallop     Abdomen:   Soft, non-tender, non-distended; normal bowel sounds; no masses, no organomegaly    Rectal:   Deferred   Neuro:    Alert, oriented, no gross deficits, normal strength and tone   Extremities:  No cyanosis, clubbing or edema    Psych:  Normal mood and affect    Skin:  No jaundice, rashes, or lesions    Lymph nodes:  No palpable cervical lymphadenopathy Lab Results:   No visits with results within 1 Day(s) from this visit     Latest known visit with results is:   Lab on 01/05/2021   Component Date Value    WBC 01/05/2021 5 78     RBC 01/05/2021 3 40*    Hemoglobin 01/05/2021 9 3*    Hematocrit 01/05/2021 31 8*    MCV 01/05/2021 94     MCH 01/05/2021 27 4     MCHC 01/05/2021 29 2*    RDW 01/05/2021 15 3*    MPV 01/05/2021 10 5     Platelets 11/70/0587 174     nRBC 01/05/2021 0     Neutrophils Relative 01/05/2021 72     Immat GRANS % 01/05/2021 0     Lymphocytes Relative 01/05/2021 13*    Monocytes Relative 01/05/2021 9     Eosinophils Relative 01/05/2021 5     Basophils Relative 01/05/2021 1     Neutrophils Absolute 01/05/2021 4 16     Immature Grans Absolute 01/05/2021 0 02     Lymphocytes Absolute 01/05/2021 0 74     Monocytes Absolute 01/05/2021 0 50     Eosinophils Absolute 01/05/2021 0 30     Basophils Absolute 01/05/2021 0 06     Sodium 01/05/2021 138     Potassium 01/05/2021 3 5     Chloride 01/05/2021 102     CO2 01/05/2021 34*    ANION GAP 01/05/2021 2*    BUN 01/05/2021 16     Creatinine 01/05/2021 1 24     Glucose, Fasting 01/05/2021 130*    Calcium 01/05/2021 8 5     Corrected Calcium 01/05/2021 9 5     AST 01/05/2021 23     ALT 01/05/2021 19     Alkaline Phosphatase 01/05/2021 135*    Total Protein 01/05/2021 7 4     Albumin 01/05/2021 2 8*    Total Bilirubin 01/05/2021 1 00     eGFR 01/05/2021 62     AFP TUMOR MARKER 01/05/2021 4 3     KAY 01/05/2021 Negative     Mitochondrial Ab 01/05/2021 <20 0     Smooth Muscle Ab 01/05/2021 5     Hepatitis B Surface Ag 01/05/2021 Non-reactive     Hepatitis C Ab 01/05/2021 Non-reactive     Hep B C IgM 01/05/2021 Non-reactive     Hep B Core Total Ab 01/05/2021 Non-reactive     IGA 01/05/2021 584 0*    TISSUE TRANSGLUTAMINASE * 01/05/2021 <2     Protime 01/05/2021 22 5*    INR 01/05/2021 2 03*    Hemoglobin A1C 01/05/2021 6 1*    EAG 01/05/2021 128     Iron Saturation 01/05/2021 12     TIBC 01/05/2021 409     Iron 01/05/2021 51*    Ferritin 01/05/2021 52          Radiology Results:   No results found

## 2021-01-15 DIAGNOSIS — Z20.822 ENCOUNTER FOR LABORATORY TESTING FOR COVID-19 VIRUS: ICD-10-CM

## 2021-01-15 PROCEDURE — U0003 INFECTIOUS AGENT DETECTION BY NUCLEIC ACID (DNA OR RNA); SEVERE ACUTE RESPIRATORY SYNDROME CORONAVIRUS 2 (SARS-COV-2) (CORONAVIRUS DISEASE [COVID-19]), AMPLIFIED PROBE TECHNIQUE, MAKING USE OF HIGH THROUGHPUT TECHNOLOGIES AS DESCRIBED BY CMS-2020-01-R: HCPCS | Performed by: INTERNAL MEDICINE

## 2021-01-15 PROCEDURE — U0005 INFEC AGEN DETEC AMPLI PROBE: HCPCS | Performed by: INTERNAL MEDICINE

## 2021-01-16 LAB — SARS-COV-2 RNA SPEC QL NAA+PROBE: NOT DETECTED

## 2021-01-20 ENCOUNTER — HOSPITAL ENCOUNTER (OUTPATIENT)
Dept: SLEEP CENTER | Facility: HOSPITAL | Age: 61
Discharge: HOME | End: 2021-01-20
Attending: INTERNAL MEDICINE
Payer: COMMERCIAL

## 2021-01-20 DIAGNOSIS — I50.32 CHRONIC DIASTOLIC CONGESTIVE HEART FAILURE (HCC): ICD-10-CM

## 2021-01-20 DIAGNOSIS — G47.33 OSA (OBSTRUCTIVE SLEEP APNEA): ICD-10-CM

## 2021-01-20 PROCEDURE — 95811 POLYSOM 6/>YRS CPAP 4/> PARM: CPT

## 2021-01-21 DIAGNOSIS — G47.33 OSA (OBSTRUCTIVE SLEEP APNEA): Primary | ICD-10-CM

## 2021-01-21 NOTE — PROGRESS NOTES
Sleep Study Documentation    Pre-Sleep Study       Sleep testing procedure explained to patient:YES    Patient napped prior to study:YES- less than 30 minutes  Napped after 2PM: no    Caffeine:Dayshift worker after 12PM   Caffeine use:NO    Alcohol:Dayshift workers after 5PM: Alcohol use:NO    Typical day for patient:YES       Study Documentation    Sleep Study Indications: STEFF diagnosed in lab    Sleep Study: Treatment   Optimal PAP pressure: +17/11  Leak:Small  Snore:Eliminated  REM Obtained:yes  Supplemental O2: no    Minimum SaO2 76%  Baseline SaO2 91%  PAP mask tried (list all)Simplus  PAP mask choice (final)Medium F&P Simplus  PAP mask type:full face  PAP pressure at which snoring was eliminated +17/11  Minimum SaO2 at final PAP pressure 88  Mode of Therapy:BiPAP  ETCO2:No  CPAP changed to BiPAP:Yes  If yes why ongoing respiratory events, desaturations    Mode of Therapy:BiPAP    EKG abnormalities: yes:  EPOCH example and comments: A-Fib throughout    EEG abnormalities: no    Sleep Study Recorded < 2 hours: N/A    Sleep Study Recorded > 2 hours but incomplete study: N/A    Sleep Study Recorded 6 hours but no sleep obtained: NO    Patient classification: employed       Post-Sleep Study    Medication used at bedtime or during sleep study:NO    Patient reports time it took to fall asleep:20 to 30 minutes    Patient reports waking up during study:1 to 2 times  Patient reports returning to sleep without difficulty  Patient reports sleeping 4 to 6 hours without dreaming  Patient reports sleep during study:better than usual    Patient rated sleepiness: Somewhat sleepy or tired    PAP treatment:yes: Post PAP treatment patient reports feeling better and  would wear PAP mask at home

## 2021-01-22 ENCOUNTER — TELEPHONE (OUTPATIENT)
Dept: SLEEP CENTER | Facility: CLINIC | Age: 61
End: 2021-01-22

## 2021-01-22 NOTE — TELEPHONE ENCOUNTER
Left message for patient to call office    Sleep study resulted, Dr Rubio Renteria recommends BiPAP  Will need follow up and DME set up and compliance follow up

## 2021-01-22 NOTE — TELEPHONE ENCOUNTER
Advised patient sleep study results and scheduled DME set up and compliance appointments    Appointment information emailed to [facility]

## 2021-02-04 ENCOUNTER — TELEPHONE (OUTPATIENT)
Dept: SLEEP CENTER | Facility: CLINIC | Age: 61
End: 2021-02-04

## 2021-02-10 ENCOUNTER — ANTICOAG VISIT (OUTPATIENT)
Dept: CARDIOLOGY CLINIC | Facility: CLINIC | Age: 61
End: 2021-02-10
Payer: COMMERCIAL

## 2021-02-10 DIAGNOSIS — I48.19 PERSISTENT ATRIAL FIBRILLATION (HCC): ICD-10-CM

## 2021-02-10 DIAGNOSIS — Z79.01 LONG TERM (CURRENT) USE OF ANTICOAGULANTS: ICD-10-CM

## 2021-02-10 LAB — SL AMB POCT INR: 1.8

## 2021-02-10 PROCEDURE — 85610 PROTHROMBIN TIME: CPT | Performed by: PHYSICIAN ASSISTANT

## 2021-02-10 PROCEDURE — 93793 ANTICOAG MGMT PT WARFARIN: CPT | Performed by: PHYSICIAN ASSISTANT

## 2021-02-10 NOTE — PATIENT INSTRUCTIONS
No changes in medication health or diet  No missed or extra doses  No s/s of bleeding TIA or CVA  No new ABX, NSAIDs OCT meds, or supplements  Dose as instructed and recheck in two weeks     Titus Johnson PA-C

## 2021-02-22 ENCOUNTER — APPOINTMENT (EMERGENCY)
Dept: CT IMAGING | Facility: HOSPITAL | Age: 61
DRG: 291 | End: 2021-02-22
Payer: COMMERCIAL

## 2021-02-22 ENCOUNTER — HOSPITAL ENCOUNTER (INPATIENT)
Facility: HOSPITAL | Age: 61
LOS: 17 days | Discharge: HOME HEALTH | DRG: 291 | End: 2021-03-11
Attending: EMERGENCY MEDICINE | Admitting: INTERNAL MEDICINE
Payer: COMMERCIAL

## 2021-02-22 ENCOUNTER — APPOINTMENT (INPATIENT)
Dept: NON INVASIVE DIAGNOSTICS | Facility: HOSPITAL | Age: 61
DRG: 291 | End: 2021-02-22
Payer: COMMERCIAL

## 2021-02-22 DIAGNOSIS — E55.9 VITAMIN D DEFICIENCY: ICD-10-CM

## 2021-02-22 DIAGNOSIS — E11.65 TYPE 2 DIABETES MELLITUS WITH HYPERGLYCEMIA, WITHOUT LONG-TERM CURRENT USE OF INSULIN (HCC): ICD-10-CM

## 2021-02-22 DIAGNOSIS — R18.8 CIRRHOSIS OF LIVER WITH ASCITES, UNSPECIFIED HEPATIC CIRRHOSIS TYPE (HCC): ICD-10-CM

## 2021-02-22 DIAGNOSIS — R18.8 ASCITES: ICD-10-CM

## 2021-02-22 DIAGNOSIS — D64.9 ANEMIA, UNSPECIFIED TYPE: ICD-10-CM

## 2021-02-22 DIAGNOSIS — K59.00 CONSTIPATION, UNSPECIFIED CONSTIPATION TYPE: ICD-10-CM

## 2021-02-22 DIAGNOSIS — E87.6 HYPOKALEMIA: ICD-10-CM

## 2021-02-22 DIAGNOSIS — E87.1 HYPONATREMIA: ICD-10-CM

## 2021-02-22 DIAGNOSIS — E83.52 HYPERCALCEMIA: ICD-10-CM

## 2021-02-22 DIAGNOSIS — K74.60 CIRRHOSIS OF LIVER WITH ASCITES, UNSPECIFIED HEPATIC CIRRHOSIS TYPE (HCC): ICD-10-CM

## 2021-02-22 DIAGNOSIS — R09.02 HYPOXIA: Primary | ICD-10-CM

## 2021-02-22 DIAGNOSIS — N18.30 STAGE 3 CHRONIC KIDNEY DISEASE, UNSPECIFIED WHETHER STAGE 3A OR 3B CKD (HCC): ICD-10-CM

## 2021-02-22 DIAGNOSIS — J90 PLEURAL EFFUSION ON RIGHT: ICD-10-CM

## 2021-02-22 DIAGNOSIS — K20.90 ESOPHAGITIS: ICD-10-CM

## 2021-02-22 DIAGNOSIS — I50.33 ACUTE ON CHRONIC DIASTOLIC CHF (CONGESTIVE HEART FAILURE) (HCC): ICD-10-CM

## 2021-02-22 DIAGNOSIS — I27.20 PULMONARY HYPERTENSION (HCC): ICD-10-CM

## 2021-02-22 DIAGNOSIS — J90 PLEURAL EFFUSION: ICD-10-CM

## 2021-02-22 DIAGNOSIS — I48.91 ATRIAL FIBRILLATION (HCC): ICD-10-CM

## 2021-02-22 PROBLEM — D73.89 LESION OF SPLEEN: Status: ACTIVE | Noted: 2021-02-22

## 2021-02-22 PROBLEM — Z72.0 TOBACCO USE: Status: ACTIVE | Noted: 2021-02-22

## 2021-02-22 PROBLEM — E87.2 LACTIC ACIDOSIS: Status: ACTIVE | Noted: 2021-02-22

## 2021-02-22 PROBLEM — K57.30 DIVERTICULOSIS OF COLON: Status: ACTIVE | Noted: 2021-02-22

## 2021-02-22 PROBLEM — I48.21 PERMANENT ATRIAL FIBRILLATION (HCC): Status: ACTIVE | Noted: 2021-02-22

## 2021-02-22 PROBLEM — R93.2 ABNORMAL CT SCAN, LIVER: Status: ACTIVE | Noted: 2021-02-22

## 2021-02-22 LAB
ALBUMIN SERPL BCP-MCNC: 2.6 G/DL (ref 3.5–5)
ALP SERPL-CCNC: 126 U/L (ref 46–116)
ALT SERPL W P-5'-P-CCNC: 17 U/L (ref 12–78)
ANION GAP SERPL CALCULATED.3IONS-SCNC: 6 MMOL/L (ref 4–13)
APTT PPP: 51 SECONDS (ref 23–37)
AST SERPL W P-5'-P-CCNC: 21 U/L (ref 5–45)
BASOPHILS # BLD AUTO: 0.06 THOUSANDS/ΜL (ref 0–0.1)
BASOPHILS NFR BLD AUTO: 1 % (ref 0–1)
BILIRUB SERPL-MCNC: 0.6 MG/DL (ref 0.2–1)
BUN SERPL-MCNC: 18 MG/DL (ref 5–25)
CALCIUM ALBUM COR SERPL-MCNC: 9.7 MG/DL (ref 8.3–10.1)
CALCIUM SERPL-MCNC: 8.6 MG/DL (ref 8.3–10.1)
CHLORIDE SERPL-SCNC: 97 MMOL/L (ref 100–108)
CO2 SERPL-SCNC: 33 MMOL/L (ref 21–32)
CREAT SERPL-MCNC: 1.3 MG/DL (ref 0.6–1.3)
DIGOXIN SERPL-MCNC: 1.6 NG/ML (ref 0.8–2)
EOSINOPHIL # BLD AUTO: 0.26 THOUSAND/ΜL (ref 0–0.61)
EOSINOPHIL NFR BLD AUTO: 5 % (ref 0–6)
ERYTHROCYTE [DISTWIDTH] IN BLOOD BY AUTOMATED COUNT: 17 % (ref 11.6–15.1)
FLUAV RNA RESP QL NAA+PROBE: NEGATIVE
FLUBV RNA RESP QL NAA+PROBE: NEGATIVE
GFR SERPL CREATININE-BSD FRML MDRD: 58 ML/MIN/1.73SQ M
GLUCOSE SERPL-MCNC: 148 MG/DL (ref 65–140)
GLUCOSE SERPL-MCNC: 154 MG/DL (ref 65–140)
GLUCOSE SERPL-MCNC: 170 MG/DL (ref 65–140)
HCT VFR BLD AUTO: 32.1 % (ref 36.5–49.3)
HGB BLD-MCNC: 9.7 G/DL (ref 12–17)
IMM GRANULOCYTES # BLD AUTO: 0.02 THOUSAND/UL (ref 0–0.2)
IMM GRANULOCYTES NFR BLD AUTO: 0 % (ref 0–2)
INR PPP: 2.37 (ref 0.84–1.19)
LACTATE SERPL-SCNC: 2.1 MMOL/L (ref 0.5–2)
LIPASE SERPL-CCNC: 291 U/L (ref 73–393)
LYMPHOCYTES # BLD AUTO: 0.71 THOUSANDS/ΜL (ref 0.6–4.47)
LYMPHOCYTES NFR BLD AUTO: 12 % (ref 14–44)
MAGNESIUM SERPL-MCNC: 2.1 MG/DL (ref 1.6–2.6)
MCH RBC QN AUTO: 27.1 PG (ref 26.8–34.3)
MCHC RBC AUTO-ENTMCNC: 30.2 G/DL (ref 31.4–37.4)
MCV RBC AUTO: 90 FL (ref 82–98)
MONOCYTES # BLD AUTO: 0.54 THOUSAND/ΜL (ref 0.17–1.22)
MONOCYTES NFR BLD AUTO: 9 % (ref 4–12)
NEUTROPHILS # BLD AUTO: 4.22 THOUSANDS/ΜL (ref 1.85–7.62)
NEUTS SEG NFR BLD AUTO: 73 % (ref 43–75)
NRBC BLD AUTO-RTO: 0 /100 WBCS
NT-PROBNP SERPL-MCNC: 1667 PG/ML
PLATELET # BLD AUTO: 198 THOUSANDS/UL (ref 149–390)
PMV BLD AUTO: 10.5 FL (ref 8.9–12.7)
POTASSIUM SERPL-SCNC: 3.7 MMOL/L (ref 3.5–5.3)
PROT SERPL-MCNC: 7.4 G/DL (ref 6.4–8.2)
PROTHROMBIN TIME: 25.4 SECONDS (ref 11.6–14.5)
RBC # BLD AUTO: 3.58 MILLION/UL (ref 3.88–5.62)
RSV RNA RESP QL NAA+PROBE: NEGATIVE
SARS-COV-2 RNA RESP QL NAA+PROBE: NEGATIVE
SODIUM SERPL-SCNC: 136 MMOL/L (ref 136–145)
TROPONIN I SERPL-MCNC: 0.03 NG/ML
WBC # BLD AUTO: 5.81 THOUSAND/UL (ref 4.31–10.16)

## 2021-02-22 PROCEDURE — 94640 AIRWAY INHALATION TREATMENT: CPT

## 2021-02-22 PROCEDURE — 36415 COLL VENOUS BLD VENIPUNCTURE: CPT | Performed by: PHYSICIAN ASSISTANT

## 2021-02-22 PROCEDURE — 85025 COMPLETE CBC W/AUTO DIFF WBC: CPT | Performed by: PHYSICIAN ASSISTANT

## 2021-02-22 PROCEDURE — G1004 CDSM NDSC: HCPCS

## 2021-02-22 PROCEDURE — 80053 COMPREHEN METABOLIC PANEL: CPT | Performed by: PHYSICIAN ASSISTANT

## 2021-02-22 PROCEDURE — 83735 ASSAY OF MAGNESIUM: CPT | Performed by: PHYSICIAN ASSISTANT

## 2021-02-22 PROCEDURE — 0241U HB NFCT DS VIR RESP RNA 4 TRGT: CPT | Performed by: PHYSICIAN ASSISTANT

## 2021-02-22 PROCEDURE — 99285 EMERGENCY DEPT VISIT HI MDM: CPT

## 2021-02-22 PROCEDURE — 74177 CT ABD & PELVIS W/CONTRAST: CPT

## 2021-02-22 PROCEDURE — 87040 BLOOD CULTURE FOR BACTERIA: CPT | Performed by: PHYSICIAN ASSISTANT

## 2021-02-22 PROCEDURE — 93306 TTE W/DOPPLER COMPLETE: CPT

## 2021-02-22 PROCEDURE — 83605 ASSAY OF LACTIC ACID: CPT | Performed by: PHYSICIAN ASSISTANT

## 2021-02-22 PROCEDURE — 84484 ASSAY OF TROPONIN QUANT: CPT | Performed by: PHYSICIAN ASSISTANT

## 2021-02-22 PROCEDURE — 99223 1ST HOSP IP/OBS HIGH 75: CPT | Performed by: INTERNAL MEDICINE

## 2021-02-22 PROCEDURE — 83690 ASSAY OF LIPASE: CPT | Performed by: PHYSICIAN ASSISTANT

## 2021-02-22 PROCEDURE — 85730 THROMBOPLASTIN TIME PARTIAL: CPT | Performed by: PHYSICIAN ASSISTANT

## 2021-02-22 PROCEDURE — 85610 PROTHROMBIN TIME: CPT | Performed by: PHYSICIAN ASSISTANT

## 2021-02-22 PROCEDURE — 80162 ASSAY OF DIGOXIN TOTAL: CPT | Performed by: PHYSICIAN ASSISTANT

## 2021-02-22 PROCEDURE — 94660 CPAP INITIATION&MGMT: CPT

## 2021-02-22 PROCEDURE — 94760 N-INVAS EAR/PLS OXIMETRY 1: CPT

## 2021-02-22 PROCEDURE — 93005 ELECTROCARDIOGRAM TRACING: CPT

## 2021-02-22 PROCEDURE — 71260 CT THORAX DX C+: CPT

## 2021-02-22 PROCEDURE — 83880 ASSAY OF NATRIURETIC PEPTIDE: CPT | Performed by: PHYSICIAN ASSISTANT

## 2021-02-22 PROCEDURE — 99285 EMERGENCY DEPT VISIT HI MDM: CPT | Performed by: PHYSICIAN ASSISTANT

## 2021-02-22 PROCEDURE — 82948 REAGENT STRIP/BLOOD GLUCOSE: CPT

## 2021-02-22 PROCEDURE — 93306 TTE W/DOPPLER COMPLETE: CPT | Performed by: INTERNAL MEDICINE

## 2021-02-22 RX ORDER — PANTOPRAZOLE SODIUM 40 MG/1
40 TABLET, DELAYED RELEASE ORAL
Status: DISCONTINUED | OUTPATIENT
Start: 2021-01-01 | End: 2021-01-01 | Stop reason: HOSPADM

## 2021-02-22 RX ORDER — WARFARIN SODIUM 5 MG/1
5 TABLET ORAL
Status: DISCONTINUED | OUTPATIENT
Start: 2021-02-22 | End: 2021-01-01 | Stop reason: HOSPADM

## 2021-02-22 RX ORDER — ACETAMINOPHEN 325 MG/1
650 TABLET ORAL EVERY 6 HOURS PRN
Status: DISCONTINUED | OUTPATIENT
Start: 2021-02-22 | End: 2021-02-22

## 2021-02-22 RX ORDER — IPRATROPIUM BROMIDE AND ALBUTEROL SULFATE 2.5; .5 MG/3ML; MG/3ML
3 SOLUTION RESPIRATORY (INHALATION) ONCE
Status: COMPLETED | OUTPATIENT
Start: 2021-02-22 | End: 2021-02-22

## 2021-02-22 RX ORDER — FUROSEMIDE 10 MG/ML
40 INJECTION INTRAMUSCULAR; INTRAVENOUS ONCE
Status: COMPLETED | OUTPATIENT
Start: 2021-02-22 | End: 2021-02-22

## 2021-02-22 RX ORDER — DIGOXIN 250 MCG
250 TABLET ORAL DAILY
Status: DISCONTINUED | OUTPATIENT
Start: 2021-01-01 | End: 2021-01-01

## 2021-02-22 RX ORDER — POTASSIUM CHLORIDE 20 MEQ/1
40 TABLET, EXTENDED RELEASE ORAL ONCE
Status: COMPLETED | OUTPATIENT
Start: 2021-02-22 | End: 2021-02-22

## 2021-02-22 RX ORDER — CARVEDILOL 3.12 MG/1
6.25 TABLET ORAL 2 TIMES DAILY WITH MEALS
Status: DISCONTINUED | OUTPATIENT
Start: 2021-02-22 | End: 2021-01-01 | Stop reason: HOSPADM

## 2021-02-22 RX ORDER — FUROSEMIDE 10 MG/ML
40 INJECTION INTRAMUSCULAR; INTRAVENOUS
Status: DISCONTINUED | OUTPATIENT
Start: 2021-02-22 | End: 2021-01-01

## 2021-02-22 RX ADMIN — IOHEXOL 100 ML: 350 INJECTION, SOLUTION INTRAVENOUS at 10:29

## 2021-02-22 RX ADMIN — THIAMINE HYDROCHLORIDE 200 MG: 100 INJECTION, SOLUTION INTRAMUSCULAR; INTRAVENOUS at 17:44

## 2021-02-22 RX ADMIN — INSULIN LISPRO 2 UNITS: 100 INJECTION, SOLUTION INTRAVENOUS; SUBCUTANEOUS at 15:32

## 2021-02-22 RX ADMIN — FUROSEMIDE 40 MG: 10 INJECTION, SOLUTION INTRAMUSCULAR; INTRAVENOUS at 12:06

## 2021-02-22 RX ADMIN — IPRATROPIUM BROMIDE AND ALBUTEROL SULFATE 3 ML: 2.5; .5 SOLUTION RESPIRATORY (INHALATION) at 09:51

## 2021-02-22 RX ADMIN — FUROSEMIDE 40 MG: 10 INJECTION, SOLUTION INTRAVENOUS at 17:45

## 2021-02-22 RX ADMIN — WARFARIN SODIUM 5 MG: 5 TABLET ORAL at 17:45

## 2021-02-22 RX ADMIN — POTASSIUM CHLORIDE 40 MEQ: 1500 TABLET, EXTENDED RELEASE ORAL at 15:32

## 2021-02-22 RX ADMIN — CARVEDILOL 6.25 MG: 3.12 TABLET, FILM COATED ORAL at 15:32

## 2021-02-22 RX ADMIN — INSULIN LISPRO 1 UNITS: 100 INJECTION, SOLUTION INTRAVENOUS; SUBCUTANEOUS at 21:53

## 2021-02-22 NOTE — ASSESSMENT & PLAN NOTE
Wt Readings from Last 3 Encounters:   02/22/21 (!) 168 kg (369 lb 11 4 oz)   01/13/21 (!) 159 kg (351 lb 6 4 oz)   01/12/21 (!) 161 kg (356 lb)     · Admit to med/surg level of care with telemetry monitoring  · Approximately 30 lbs   weight gain  · Follow troponin levels  · Initiate lasix 40 mg IV TID  · Continue coreg 6 25 mg PO BID  · Consult Cardiology  · Daily weights  · Strict intake/output measurements  · PT/OT
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
## 2021-02-22 NOTE — ASSESSMENT & PLAN NOTE
· Initially requiring 4 lpm of continuous supplemental oxygen via the nasal cannula to maintain oxygen saturations at 92% and above  · Secondary to right-sided pleural effusion  · Respiratory protocol  · Incentive spirometry 10 times per hour while awake

## 2021-02-22 NOTE — ASSESSMENT & PLAN NOTE
· Continue PO coreg and PO digoxin  · Continue anticoagulation with PO coumadin for a goal INR of 2-3  · Outpatient follow-up with Cardiology

## 2021-02-22 NOTE — ASSESSMENT & PLAN NOTE
Lab Results   Component Value Date    HGBA1C 6 1 (H) 01/05/2021       No results for input(s): POCGLU in the last 72 hours      Blood Sugar Average: Last 72 hrs:     · Hold metformin in the setting of a lactic acidosis  · Consider the initiation of an ACE-inhibitor for renal protection in the setting of type 2 diabetes mellitus  · Insulin sliding scale with blood glucose monitoring ACHS

## 2021-02-22 NOTE — PLAN OF CARE
Problem: Potential for Falls  Goal: Patient will remain free of falls  Description: INTERVENTIONS:  - Assess patient frequently for physical needs  -  Identify cognitive and physical deficits and behaviors that affect risk of falls    -  [place] fall precautions as indicated by assessment   - Educate patient/family on patient safety including physical limitations  - Instruct patient to call for assistance with activity based on assessment  - Modify environment to reduce risk of injury  - Consider OT/PT consult to assist with strengthening/mobility  2/22/2021 1332 by Maldonado Rosales RN  Outcome: Progressing  2/22/2021 1329 by Maldonado Rosales RN  Outcome: Progressing

## 2021-02-22 NOTE — ASSESSMENT & PLAN NOTE
· Likely secondary to chronic metformin use  · Give thiamine 200 mg IV x 1 dose  · Unable to treat with IV fluids in the setting of acute on chronic diastolic congestive heart failure  · Follow the lactic acid level until the lactic acidosis resolves

## 2021-02-22 NOTE — RESPIRATORY THERAPY NOTE
RT Protocol Note  Allison Night 57 y o  male MRN: [id number]  Unit/Bed#: 410-01 Encounter: [id number]    Assessment    Principal Problem:    Acute on chronic diastolic CHF (congestive heart failure) (HCC)  Active Problems:    STEFF (obstructive sleep apnea)    Pleural effusion on right    Cirrhosis of liver with ascites (HCC)    CKD (chronic kidney disease) stage 3, GFR 30-59 ml/min    Lactic acidosis    Permanent atrial fibrillation (HCC)    Diverticulosis of colon    Lesion of spleen    Type 2 diabetes mellitus with hyperglycemia, without long-term current use of insulin (HCC)    Hypoxia    Tobacco use      Home Pulmonary Medications:  Bipap 17/11 @   Home Devices/Therapy: BiPAP/CPAP    Past Medical History:   Diagnosis Date    A-fib ([place] [address] )     Cardiac disease     Chronic combined systolic (congestive) and diastolic (congestive) heart failure (HCC)     Diabetes mellitus ([place] [address] )     Dilated cardiomyopathy ([place] [address] )     History of echocardiogram 11/24/2014    EF 0 30 (30%), Likely mod LV systolic dysfunction  Likely RV dysfunction as well   History of Lexiscan MPI 02/19/2016    EF 0 43 (43%), no prior MI or ischemia   Hx of echocardiogram 04/28/2017    Normal EF, Normal LV systolic function  Mild concentric LV hypertrophy  Mild mitral and tricuspid regurg      Hx: recurrent pneumonia     Hypertension     Long term (current) use of anticoagulants     Morbid (severe) obesity due to excess calories (HCC)     Neuropathy      Social History     Socioeconomic History    Marital status: /Civil Union     Spouse name: Not on file    Number of children: Not on file    Years of education: Not on file    Highest education level: Not on file   Occupational History    Not on file   Social Needs    Financial resource strain: Not on file    Food insecurity     Worry: Not on file     Inability: Not on file    Transportation needs     Medical: Not on file     Non-medical: Not on file   Tobacco Use    Smoking status: Current Some Day Smoker     Packs/day: 0 25     Types: Cigarettes    Smokeless tobacco: Never Used    Tobacco comment: 2-3 CIGARETTES DAILY   Substance and Sexual Activity    Alcohol use: Yes     Alcohol/week: 14 0 standard drinks     Types: 5 Glasses of wine, 5 Cans of beer, 4 Shots of liquor per week     Frequency: 2-3 times a week     Drinks per session: 5 or 6     Binge frequency: Never     Comment: WEEKLY    Drug use: Never    Sexual activity: Yes   Lifestyle    Physical activity     Days per week: Not on file     Minutes per session: Not on file    Stress: Not on file   Relationships    Social connections     Talks on phone: Not on file     Gets together: Not on file     Attends Scientologist service: Not on file     Active member of club or organization: Not on file     Attends meetings of clubs or organizations: Not on file     Relationship status: Not on file    Intimate partner violence     Fear of current or ex partner: Not on file     Emotionally abused: Not on file     Physically abused: Not on file     Forced sexual activity: Not on file   Other Topics Concern    Not on file   Social History Narrative    Not on file       Subjective     " I need my mask at night or I can not sleep"    Objective    Physical Exam:   Assessment Type: Assess only  General Appearance: Alert, Awake  Respiratory Pattern: Dyspnea with exertion  Chest Assessment: Chest expansion symmetrical  Bilateral Breath Sounds: Diminished    Vitals:  Blood pressure 162/89, pulse 76, temperature 98 3 °F (36 8 °C), temperature source Oral, resp  rate 18, height 5' 11" (1 803 m), weight (!) 168 kg (369 lb 11 4 oz), SpO2 95 %  Imaging and other studies: I have personally reviewed pertinent reports  Plan    Respiratory Plan: Mild Distress pathway          No medications added  Discontinue Respiratory Protocol    Biprosa of 17/11 @

## 2021-02-22 NOTE — H&P
H&P- Azam Becker 1960, 57 y o  male MRN: [id number]    Unit/Bed#: 410-01 Encounter: [id number]    Primary Care Provider: Britni Salazar DO   Date and time admitted to hospital: 2/22/2021  9:27 AM        * Acute on chronic diastolic CHF (congestive heart failure) [facility])  Assessment & Plan  Wt Readings from Last 3 Encounters:   02/22/21 (!) 168 kg (369 lb 11 4 oz)   01/13/21 (!) 159 kg (351 lb 6 4 oz)   01/12/21 (!) 161 kg (356 lb)     · Admit to med/surg level of care with telemetry monitoring  · Approximately 30 lbs  weight gain  · Follow troponin levels  · Initiate lasix 40 mg IV TID  · Continue coreg 6 25 mg PO BID  · Consult Cardiology  · Daily weights  · Strict intake/output measurements  · PT/OT        Pleural effusion on right  Assessment & Plan  · Recurrent right-sided pleural effusion  · Initiate lasix 40 mg IV TID  · Consult Intervention Radiology for a right-sided thoracentesis  · Consult Pulmonology    Hypoxia  Assessment & Plan  · Initially requiring 4 lpm of continuous supplemental oxygen via the nasal cannula to maintain oxygen saturations at 92% and above  · Secondary to right-sided pleural effusion  · Respiratory protocol  · Incentive spirometry 10 times per hour while awake    Cirrhosis of liver with ascites ([facility] [address] )  Assessment & Plan  · Consult Gastroenterology  · Avoid all hepatotoxic agents    Tobacco use  Assessment & Plan  · Nicotine patch  · Smoking cessation counseling    Type 2 diabetes mellitus with hyperglycemia, without long-term current use of insulin (HCC)  Assessment & Plan  Lab Results   Component Value Date    HGBA1C 6 1 (H) 01/05/2021       No results for input(s): POCGLU in the last 72 hours      Blood Sugar Average: Last 72 hrs:     · Hold metformin in the setting of a lactic acidosis  · Consider the initiation of an ACE-inhibitor for renal protection in the setting of type 2 diabetes mellitus  · Insulin sliding scale with blood glucose monitoring ACHS    Lesion of spleen  Assessment & Plan  · Outpatient surveillance imaging with his PCP    Diverticulosis of colon  Assessment & Plan  · Outpatient follow-up with Gastroenterology    Permanent atrial fibrillation ([facility] [address] )  Assessment & Plan  · Continue PO coreg and PO digoxin  · Continue anticoagulation with PO coumadin for a goal INR of 2-3  · Outpatient follow-up with Cardiology    Lactic acidosis  Assessment & Plan  · Likely secondary to chronic metformin use  · Give thiamine 200 mg IV x 1 dose  · Unable to treat with IV fluids in the setting of acute on chronic diastolic congestive heart failure  · Follow the lactic acid level until the lactic acidosis resolves    CKD (chronic kidney disease) stage 3, GFR 30-59 ml/min  Assessment & Plan  Lab Results   Component Value Date    EGFR 58 02/22/2021    EGFR 60 01/05/2021    EGFR 62 01/05/2021    CREATININE 1 30 02/22/2021    CREATININE 1 28 01/05/2021    CREATININE 1 24 01/05/2021     · Baseline serum creatinine of 1 3-1 6 mg/dl  · Avoid all nephrotoxic agents  · Serial laboratory testing to monitor the patient's renal function and electrolytes    STEFF (obstructive sleep apnea)  Assessment & Plan  · Continue BIPAP at 17/11 QHS and anytime while sleeping        VTE Prophylaxis: Warfarin (Coumadin)  / sequential compression device   Code Status: Level 1-Full Code    Anticipated Length of Stay:  The patient will be admitted on an Inpatient basis with an anticipated length of stay of greater than 2 midnights  Justification for Hospital Stay:  The patient requires IV lasix treatment and a right-sided thoracentesis to treat the right-sided pleural effusion  Chief Complaint:  Shortness of breath    History of Present Illness:    Tawnya Rodriguez is a 57 y o  male who presents to the emergency department with the complaint of shortness of breath  Over the last month, the patient has been experiencing progressively worsening shortness of breath    The shortness of breath is present at rest and worsened with any type of physical activity or exertion  The shortness of breath was constant in nature and severe in intensity  Associated symptoms include abdominal distension, a weight gain of approximately 30 lbs  , and generalized fatigue  Nothing seemed to improve his symptoms  The patient was instructed to take a double dose of his PO lasix along with PO metolazone twice a week, however, the patient was non-compliant with this instruction secondary to not being able to climb his stairs frequently to reach his bathroom  Review of Systems:    Review of Systems:  Per HPI, all other systems have been reviewed and were negative  Past Medical and Surgical History:     Past Medical History:   Diagnosis Date    [facility]fib [facility])     Cardiac disease     Chronic combined systolic (congestive) and diastolic (congestive) heart failure (HCC)     Diabetes mellitus ([facility] [address] )     Dilated cardiomyopathy ([facility] [address] )     History of echocardiogram 11/24/2014    EF 0 30 (30%), Likely mod LV systolic dysfunction  Likely RV dysfunction as well   History of Lexiscan MPI 02/19/2016    EF 0 43 (43%), no prior MI or ischemia   Hx of echocardiogram 04/28/2017    Normal EF, Normal LV systolic function  Mild concentric LV hypertrophy  Mild mitral and tricuspid regurg   Hx: recurrent pneumonia     Hypertension     Long term (current) use of anticoagulants     Morbid (severe) obesity due to excess calories (HCC)     Neuropathy        Past Surgical History:   Procedure Laterality Date    HERNIA REPAIR      IR THORACENTESIS  8/25/2020    SPLENECTOMY, TOTAL      VASCULAR SURGERY Right     leg       Meds/Allergies:    Prior to Admission medications    Medication Sig Start Date End Date Taking?  Authorizing Provider   carvedilol (COREG) 6 25 mg tablet Take 1 tablet (6 25 mg total) by mouth 2 (two) times a day with meals 11/12/20  Yes Lula Borja PA-C   digoxin (LANOXIN) 0 25 mg tablet Take 1 tablet (250 mcg total) by mouth daily 12/16/20  Yes Ahmet Chavez DO   furosemide (LASIX) 40 mg tablet Take 1 tablet (40 mg total) by mouth daily 11/12/20  Yes Juancarlos Farah PA-C   metFORMIN (GLUCOPHAGE) 1000 MG tablet TAKE 1 TABLET BY MOUTH TWICE A DAY 10/21/20  Yes Stefan Chapa DO   metolazone (ZAROXOLYN) 5 mg tablet TAKE 1 TABLET TWICE A WEEK 30 MINUTES BEFORE LASIX DOSE 12/16/20  Yes Ahmet Chavez DO   omeprazole (PriLOSEC) 40 MG capsule TAKE 1 CAPSULE BY MOUTH TWICE A DAY 9/14/20  Yes Jenna Grigsby MD   warfarin (COUMADIN) 5 mg tablet Take 1/2 to 1 tablet by oral route daily  11/12/20  Yes Juancarlos Farah PA-C   zolpidem (AMBIEN) 10 mg tablet Take 1 tablet (10 mg total) by mouth as needed for sleep (Take 1 tablet 30 minutes prior to lights out on night of sleep study )  Patient taking differently: Take 10 mg by mouth as needed for sleep (Take 1 tablet 30 minutes prior to lights out on night of sleep study 01/21/2021 )  11/5/20  Yes Estefany Roldan MD     I have reviewed home medications with patient personally      Allergies: No Known Allergies    Social History:     Marital Status: /Civil Union     Substance Use History:   Social History     Substance and Sexual Activity   Alcohol Use Yes    Alcohol/week: 14 0 standard drinks    Types: 5 Glasses of wine, 5 Cans of beer, 4 Shots of liquor per week    Frequency: 2-3 times a week    Drinks per session: 5 or 6    Binge frequency: Never    Comment: WEEKLY     Social History     Tobacco Use   Smoking Status Current Some Day Smoker    Packs/day: 0 25    Types: Cigarettes   Smokeless Tobacco Never Used   Tobacco Comment    2-3 CIGARETTES DAILY     Social History     Substance and Sexual Activity   Drug Use Never       Family History:    non-contributory    Physical Exam:     Vitals:   Blood Pressure: 162/89 (02/22/21 1238)  Pulse: 75 (02/22/21 1238)  Temperature: 98 3 °F (36 8 °C) (02/22/21 1238)  Temp Source: Oral (02/22/21 1238)  Respirations: 17 (02/22/21 1238)  Height: 5' 11" (180 3 cm) (02/22/21 1238)  Weight - Scale: (!) 168 kg (369 lb 11 4 oz) (02/22/21 1238)  SpO2: 95 % (02/22/21 1238)    Physical Exam  General:  NAD, follows commands  HEENT:  NC/AT, mucous membranes moist  Neck:  Supple, No JVP elevation  CV:  + S1, + S2, Irregularly irregular rhythm, Regular rate  Pulm:  Decreased breath sounds at the right base, Crackles at the left base  Abd:  Soft, Non-tender, Moderate distension  Ext:  No clubbing/cyanosis, Extensive edema of the bilateral lower extremities  Skin:  No rashes  Neuro:  Awake, alert, oriented  Psych:  Normal mood and affect      Additional Data:     Lab Results: I have personally reviewed pertinent reports  Results from last 7 days   Lab Units 02/22/21  0945   WBC Thousand/uL 5 81   HEMOGLOBIN g/dL 9 7*   HEMATOCRIT % 32 1*   PLATELETS Thousands/uL 198   NEUTROS PCT % 73   LYMPHS PCT % 12*   MONOS PCT % 9   EOS PCT % 5     Results from last 7 days   Lab Units 02/22/21  0945   SODIUM mmol/L 136   POTASSIUM mmol/L 3 7   CHLORIDE mmol/L 97*   CO2 mmol/L 33*   BUN mg/dL 18   CREATININE mg/dL 1 30   ANION GAP mmol/L 6   CALCIUM mg/dL 8 6   ALBUMIN g/dL 2 6*   TOTAL BILIRUBIN mg/dL 0 60   ALK PHOS U/L 126*   ALT U/L 17   AST U/L 21   GLUCOSE RANDOM mg/dL 148*     Results from last 7 days   Lab Units 02/22/21  0945   INR  2 37*             Results from last 7 days   Lab Units 02/22/21  0945   LACTIC ACID mmol/L 2 1*       Imaging: I have personally reviewed pertinent reports  CT chest abdomen pelvis w contrast   Final Result by Johnathan Corral MD (02/22 1120)      1  Moderate-sized to large right pleural effusion with multisegmental compressive collapse in the right middle and lower lobes  2   Other findings of volume overload including engorgement of the IVC, mild ascites, and subcutaneous edema  3   Antidependent distribution of body wall soft tissue swelling is somewhat atypical for anasarca    Correlate for evidence of cellulitis  4   Chronic abdominopelvic findings including hepatomegaly and probable cirrhosis, cholelithiasis, subcapsular cystic lesion in the spleen (probably related to old trauma), and colonic diverticulosis  No acute or inflammatory findings in the abdomen or    pelvis  Workstation performed: [id number]         IR IN-patient Thoracentesis    (Results Pending)       EKG, Pathology, and Other Studies Reviewed on Admission:   · EKG (02/22/2021):  Atrial fibrillation with a heart rate of 76 bpm     Allscripts / Epic Records Reviewed: Yes     ** Please Note: This note has been constructed using a voice recognition system   **

## 2021-02-22 NOTE — ASSESSMENT & PLAN NOTE
· Recurrent right-sided pleural effusion  · Initiate lasix 40 mg IV TID  · Consult Intervention Radiology for a right-sided thoracentesis  · Consult Pulmonology

## 2021-02-22 NOTE — ASSESSMENT & PLAN NOTE
Lab Results   Component Value Date    EGFR 58 02/22/2021    EGFR 60 01/05/2021    EGFR 62 01/05/2021    CREATININE 1 30 02/22/2021    CREATININE 1 28 01/05/2021    CREATININE 1 24 01/05/2021     · Baseline serum creatinine of 1 3-1 6 mg/dl

## 2021-02-23 PROBLEM — E87.1 HYPONATREMIA: Status: ACTIVE | Noted: 2021-01-01

## 2021-02-23 NOTE — CONSULTS
Consultation - Pulmonary Medicine   Corinne Hail 57 y o  male MRN: [id number]  Unit/Bed#: 410-01 Encounter: [id number]      Assessment/Plan:    1  Right-sided pleural effusion  1  S/p right thoracentesis this morning with IR  2  Check CXR after procedure  3  Fluid is transudative based on Light's criteria likely in the setting of hepatic hydrothorax   4  Cytology and cultures still pending  5  Continue diuretics as below  2  Acute hypoxic respiratory failure   1  Initially requiring 4 L nasal cannula- now resolved and on room air during my evaluation  2  Monitor SpO2 closely and maintain above 90%   3  Pulmonary toilet: Increase activity as tolerated, out of bed as tolerated, cough and deep breathing as encouraged, incentive spirometry q 1 hour  3  Acute on chronic diastolic CHF  1   markedly volume overloaded on exam  2  Cardiology consulted- recommending Lasix 40 mg IV TID   3   monitor I/and daily weights  4  STEFF   1  Agree with BiPAP 17/11 cmH2O during all hours of sleep  5  Nicotine dependence with suspected underlying COPD without acute exacerbation  1   no need for systemic steroids  2  Would benefit from pulmonary function tests and pulmonary follow-up outpatient  6  Atrial fibrillation  1  Medical management per primary team/cardiology  2  AC with coumadin- held today for IR thora  7  Cirrhosis with ascites   1  Avoid hepatotoxic agents as able  2  GI consult pending  8  Morbid obesity  1  Weight loss encouraged   9  Pulmonary hypertension   1   echocardiogram 02/22/2021 with estimated peak PA pressure of 52 mm Hg   2  Multifactorial- treat underlying cause  3  Continue supplemental oxygen    History of Present Illness   Physician Requesting Consult: Heriberto Collazo DO  Reason for Consult / Principal Problem: pleural effusion  Hx and PE limited by: n/a  Chief Complaint: shortness of breath  HPI: Corinne Hail is a 57 y o    male who presented to [facility] with complaints of worsening  Dyspnea on exertion for the last several months  Patient's past medical history positive for chronic atrial fibrillation on Coumadin, chronic diastolic CHF, hypertension, diabetes, STEFF, morbid obesity, pulmonary hypertension  Patient presented to the ED 02/22/2021 for evaluation of worsening shortness of breath him a dyspnea on exertion, lower extremity edema and abnormal weight gain over last several months  Patient tells me that this is an ongoing since November best progressively gotten worse especially in the last couple of weeks  He reports increased lower extremity edema and abdominal bloating  His scale at home is unreliable according to patient but it appears that he has gained anywhere from 30-50 lb from his dry weight  Patient also admits to noncompliance on his home diuretics  Both Lasix and metolazone  Denies fevers, chills, headache, dizziness, palpitations, nausea, vomiting, abdominal pain  No wheeze  Denies mucopurulent sputum or cough  Upon admission he underwent CT chest abdomen pelvis which showed a moderate-sized right pleural effusion with perspective collapse of the right middle and lower lobes  CT also showed mild ascites and subcutaneous edema  He had IR thoracentesis today  Of note, patient has similar presentation in August where he also had a thoracentesis  For persistent right pleural effusion secondary to hepatic hydrothorax  Patient has STEFF and reports compliance on at home  He wears BiPAP 17/11 cm H2O  Patient smokes 2-3 cigarettes daily  No inhalers or nebulizers at home  Inpatient consult to Pulmonology  Consult performed by: Cachorro Garcia PA-C  Consult ordered by: Oumar Ramirez DO          Review of Systems   All other systems reviewed and are negative        Historical Information   Past Medical History:   Diagnosis Date    A-fib [facility])     Cardiac disease     Chronic combined systolic (congestive) and diastolic (congestive) heart failure ([facility] )     Diabetes mellitus ([facility] )     Dilated cardiomyopathy ([place] [address] )     History of echocardiogram 11/24/2014    EF 0 30 (30%), Likely mod LV systolic dysfunction  Likely RV dysfunction as well   History of Lexiscan MPI 02/19/2016    EF 0 43 (43%), no prior MI or ischemia   Hx of echocardiogram 04/28/2017    Normal EF, Normal LV systolic function  Mild concentric LV hypertrophy  Mild mitral and tricuspid regurg   Hx: recurrent pneumonia     Hypertension     Long term (current) use of anticoagulants     Morbid (severe) obesity due to excess calories (HCC)     Neuropathy      Past Surgical History:   Procedure Laterality Date    HERNIA REPAIR      IR THORACENTESIS  8/25/2020    SPLENECTOMY, TOTAL      VASCULAR SURGERY Right     leg     Social History   Social History     Substance and Sexual Activity   Alcohol Use Yes    Alcohol/week: 14 0 standard drinks    Types: 5 Glasses of wine, 5 Cans of beer, 4 Shots of liquor per week    Frequency: 2-3 times a week    Drinks per session: 5 or 6    Binge frequency: Never    Comment: WEEKLY     Social History     Substance and Sexual Activity   Drug Use Never     Social History     Tobacco Use   Smoking Status Current Some Day Smoker    Packs/day: 0 25    Types: Cigarettes   Smokeless Tobacco Never Used   Tobacco Comment    2-3 CIGARETTES DAILY     E-Cigarette/Vaping    E-Cigarette Use Never User      E-Cigarette/Vaping Substances    Nicotine No     THC No     CBD No     Flavoring No     Other No     Unknown No        Family History: No family history on file      Meds/Allergies   all current active meds have been reviewed, pertinent pulmonary meds have been reviewed and current meds:   Current Facility-Administered Medications   Medication Dose Route Frequency    carvedilol (COREG) tablet 6 25 mg  6 25 mg Oral BID With Meals    digoxin (LANOXIN) tablet 250 mcg  250 mcg Oral Daily    furosemide (LASIX) injection 40 mg  40 mg Intravenous TID (diuretic)    insulin lispro (HumaLOG) 100 units/mL subcutaneous injection 1-6 Units  1-6 Units Subcutaneous HS    insulin lispro (HumaLOG) 100 units/mL subcutaneous injection 2-12 Units  2-12 Units Subcutaneous TID AC    nicotine (NICODERM CQ) 7 mg/24hr TD 24 hr patch 1 patch  1 patch Transdermal Daily    pantoprazole (PROTONIX) EC tablet 40 mg  40 mg Oral Early Morning    warfarin (COUMADIN) tablet 5 mg  5 mg Oral Daily (warfarin)       No Known Allergies    Objective   Vitals: Blood pressure 115/75, pulse 65, temperature (!) 97 4 °F (36 3 °C), resp  rate 18, height 5' 11" (1 803 m), weight (!) 167 kg (367 lb 15 2 oz), SpO2 98 %  room air,Body mass index is 51 32 kg/m²  Intake/Output Summary (Last 24 hours) at 2/23/2021 1111  Last data filed at 2/23/2021 0815  Gross per 24 hour   Intake 720 ml   Output --   Net 720 ml     Invasive Devices     Peripheral Intravenous Line            Peripheral IV 02/22/21 Right Antecubital less than 1 day                Physical Exam  Vitals signs and nursing note reviewed  Constitutional:       General: He is not in acute distress  Appearance: Normal appearance  He is obese  HENT:      Head: Normocephalic and atraumatic  Right Ear: External ear normal       Left Ear: External ear normal       Nose: Nose normal       Mouth/Throat:      Mouth: Mucous membranes are moist       Pharynx: Oropharynx is clear  Eyes:      Extraocular Movements: Extraocular movements intact  Conjunctiva/sclera: Conjunctivae normal       Pupils: Pupils are equal, round, and reactive to light  Neck:      Musculoskeletal: Normal range of motion and neck supple  No muscular tenderness  Cardiovascular:      Rate and Rhythm: Normal rate and regular rhythm  Pulses: Normal pulses  Heart sounds: No murmur  Pulmonary:      Breath sounds: Rales (RLL) present  Abdominal:      General: Bowel sounds are normal  There is distension  Palpations: Abdomen is soft   There is no mass  Hernia: No hernia is present  Musculoskeletal: Normal range of motion  General: No tenderness or deformity  Right lower leg: Edema present  Left lower leg: Edema present  Skin:     General: Skin is warm and dry  Neurological:      General: No focal deficit present  Mental Status: He is alert and oriented to person, place, and time  Mental status is at baseline  Psychiatric:         Mood and Affect: Mood normal          Behavior: Behavior normal          Thought Content: Thought content normal          Judgment: Judgment normal          Lab Results:   I have personally reviewed pertinent lab results  , ABG: No results found for: PHART, YSH6JWY, PO2ART, RUT2MHY, S0TFZJEE, BEART, SOURCE, BNP: No results found for: BNP, CBC:   Lab Results   Component Value Date    WBC 6 26 02/23/2021    HGB 9 0 (L) 02/23/2021    HCT 29 5 (L) 02/23/2021    MCV 90 02/23/2021     02/23/2021    MCH 27 5 02/23/2021    MCHC 30 5 (L) 02/23/2021    RDW 17 0 (H) 02/23/2021    MPV 10 5 02/23/2021    NRBC 0 02/23/2021   , CMP:   Lab Results   Component Value Date    SODIUM 135 (L) 02/23/2021    K 4 1 02/23/2021    CL 97 (L) 02/23/2021    CO2 31 02/23/2021    BUN 20 02/23/2021    CREATININE 1 28 02/23/2021    CALCIUM 8 5 02/23/2021    AST 30 02/23/2021    ALT 16 02/23/2021    ALKPHOS 123 (H) 02/23/2021    EGFR 60 02/23/2021   , PT/INR:   Lab Results   Component Value Date    INR 2 56 (H) 02/23/2021   , Troponin:   Lab Results   Component Value Date    TROPONINI 0 03 02/23/2021         Imaging Studies: I have personally reviewed pertinent reports  and I have personally reviewed pertinent films in PACS      CT chest abdomen pelvis 02/22/2021   Moderate to large right pleural effusion  With near collapse of the right lower lobe and lateral segmental collapse of right middle lobe  Ground-glass opacities posterior in upper lobes attributable to subsegmental compressive atelectasis    Coronary calcifications  Liver consistent with cirrhosis  Enlarged spleen  Mild ascites  Subcutaneous edema  Engorgement of the IVC  Cholelithiasis  Colonic diverticulosis  EKG, Pathology, and Other Studies: I have personally reviewed pertinent reports  Echo 02/22/2021   EF 60%  Wall thickness mildly increased  Right ventricle moderately dilated with reduced systolic function  Estimated peak PA pressure 52 mm Hg  Pulmonary Results (PFTs, PSG): none to review     VTE Prophylaxis: Sequential compression device (Venodyne)     Code Status: Level 1 - Full Code      Portions of the record may have been created with voice recognition software  Occasional wrong word or "sound a like" substitutions may have occurred due to the inherent limitations of voice recognition software  Read the chart carefully and recognize, using context, where substitutions have occurred

## 2021-02-23 NOTE — ASSESSMENT & PLAN NOTE
Lab Results   Component Value Date    EGFR 60 02/23/2021    EGFR 58 02/22/2021    EGFR 60 01/05/2021    CREATININE 1 28 02/23/2021    CREATININE 1 30 02/22/2021    CREATININE 1 28 01/05/2021     · Baseline serum creatinine of 1 3-1 6 mg/dl  · Avoid all nephrotoxic agents  · Serial laboratory testing to monitor the patient's renal function and electrolytes

## 2021-02-23 NOTE — ASSESSMENT & PLAN NOTE
· Initially required 4 lpm of continuous supplemental oxygen via the nasal cannula to maintain oxygen saturations at 92% and above  · Successfully weaned off supplemental oxygen  · Secondary to right-sided pleural effusion  · Respiratory protocol  · Incentive spirometry 10 times per hour while awake

## 2021-02-23 NOTE — PROCEDURES
Interventional Radiology Procedure Note    PATIENT NAME: Owen Sy  : 1960  MRN: [id number]     Pre-op Diagnosis:   1  Hypoxia    2  Atrial fibrillation ([address] )    3  Pleural effusion    4  Ascites    5  Cirrhosis of liver with ascites, unspecified hepatic cirrhosis type ([address] )    6  Acute on chronic diastolic CHF (congestive heart failure) ([address] )    7  Pleural effusion on right      2  Dyspnea  Dyspnea previously relieved with thoracentesis  Post-op Diagnosis:   1  Hypoxia    2  Atrial fibrillation ([address] )    3  Pleural effusion    4  Ascites    5  Cirrhosis of liver with ascites, unspecified hepatic cirrhosis type ([address] )    6  Acute on chronic diastolic CHF (congestive heart failure) ([address] )    7  Pleural effusion on right      2  Same    Procedure: Thoracentesis on the right    Surgeon:   Mohini Muñoz MD  Assistants:     No qualified resident was available, Resident is only observing    Estimated Blood Loss: Minimal    Findings:  Clear yellow fluid removed    Please see PACS for volume    Specimens:  None sent today    Complications:  None     Anesthesia: local    Mohini Muñoz MD     Date: 2021  Time: 1:58 PM  I

## 2021-02-23 NOTE — CONSULTS
Consultation - Cardiology   Corinne Hail 57 y o  male MRN: [id number]  Unit/Bed#: 410-01 Encounter: [id number]    Inpatient consult to Cardiology  Consult performed by: Enma Ball PA-C  Consult ordered by: Matt Dorman DO            Physician Requesting Consult: Matt Dorman DO  Reason for Consult / Principal Problem: acute on chronic diastolic congestive heart failure    Assessment/Plan:  1  Acute on chronic diastolic congestive heart failure   - patient is significantly volume overloaded on exam and is anywhere between 30-60 lbs above dry weight   - he will require a prolonged hospitalization for IV diuretic therapy   - agree with lasix 40 mg IV TID today - will titrate as necessary   - continue low sodium diet, will implement a fluid restriction   - patient needs strict I/O, daily weights   - monitor daily BMP   - large right pleural effusion noted on CT - for thoracentesis     2  Chronic atrial fibrillation   - remains in atrial fibrillation with controlled rates   - continue carvedilol and digoxin   - on anticoagulation with coumadin for goal INR 2-3    3  Hypertension    - has been mildly elevated, expect to improve with diuresis    4  Obstructive sleep apnea   - reports compliance with CPAP    History of Present Illness   HPI: Corinne Hail is a 57y o  year old male with chronic atrial fibrillation on coumadin, chronic diastolic congestive heart failure, hypertension, diabetes, obstructive sleep apnea, morbid obesity who follows with Dr Sherri Tong  The patient presented to the ER yesterday for the evaluation of shortness of breath, edema, and weight gain  Over the past few weeks he has had progressively worsening dyspnea on exertion  He has noticed increased lower extremity edema and abdominal bloating  His clothes have been ill-fitting as a result  He has not been weighing himself at home because his scale has not been working; however, he has gained anywhere from 30-50 lbs   He has been taking lasix 40 mg daily but has been missing doses of metolazone (supposed to be taking 2x per week) as he is unable to climb his stairs frequently to use the bathroom  He has been trying to limit his sodium intake although eats frozen dinners and canned soup for lunch  He denies any exertional chest discomfort  He denies lightheadedness, dizziness, palpitations, and syncope  Upon admission he underwent a CT chest abdomen and pelvis which showed a moderate-sized right pleural effusion with respective collapse of right middle lower lobes  There were also other significant findings of volume overload including ascites and subcutaneous edema  He was started on Lasix 40 mg IV-received 2 doses total yesterday  This resulted in weight loss of 2 lb  He is also to undergo thoracentesis today  Cardiology was consulted for further evaluation and management  Upon review, in December 2020 the patient weighed 336 lb  In January 2021 he weighed 356 lb  His standing weight today was 367 lb  He reports his dry weight is likely around 310 lbs  Review of Systems   Constitution: Positive for weight gain  Negative for chills and fever  HENT: Negative  Cardiovascular: Positive for dyspnea on exertion and leg swelling  Negative for chest pain, near-syncope, orthopnea, palpitations, paroxysmal nocturnal dyspnea and syncope  Respiratory: Positive for shortness of breath  Negative for cough  Gastrointestinal: Positive for bloating  Negative for diarrhea, nausea and vomiting  Genitourinary: Negative  Neurological: Negative for dizziness and light-headedness  All other systems reviewed and are negative      Historical Information   Past Medical History:   Diagnosis Date    A-fib [facility])     Cardiac disease     Chronic combined systolic (congestive) and diastolic (congestive) heart failure ([address] )     Diabetes mellitus ([facility] )     Dilated cardiomyopathy ([facility] )     History of echocardiogram 11/24/2014    EF 0 30 (30%), Likely mod LV systolic dysfunction  Likely RV dysfunction as well   History of Lexiscan MPI 02/19/2016    EF 0 43 (43%), no prior MI or ischemia   Hx of echocardiogram 04/28/2017    Normal EF, Normal LV systolic function  Mild concentric LV hypertrophy  Mild mitral and tricuspid regurg   Hx: recurrent pneumonia     Hypertension     Long term (current) use of anticoagulants     Morbid (severe) obesity due to excess calories (HCC)     Neuropathy      Past Surgical History:   Procedure Laterality Date    HERNIA REPAIR      IR THORACENTESIS  8/25/2020    SPLENECTOMY, TOTAL      VASCULAR SURGERY Right     leg     Social History     Substance and Sexual Activity   Alcohol Use Yes    Alcohol/week: 14 0 standard drinks    Types: 5 Glasses of wine, 5 Cans of beer, 4 Shots of liquor per week    Frequency: 2-3 times a week    Drinks per session: 5 or 6    Binge frequency: Never    Comment: WEEKLY     Social History     Substance and Sexual Activity   Drug Use Never     Social History     Tobacco Use   Smoking Status Current Some Day Smoker    Packs/day: 0 25    Types: Cigarettes   Smokeless Tobacco Never Used   Tobacco Comment    2-3 CIGARETTES DAILY     Family History: non-contributory    Meds/Allergies   all current active meds have been reviewed       No Known Allergies    Objective   Vitals: Blood pressure 115/75, pulse 65, temperature (!) 97 4 °F (36 3 °C), resp  rate 18, height 5' 11" (1 803 m), weight (!) 167 kg (367 lb 15 2 oz), SpO2 98 %  , Body mass index is 51 32 kg/m² ,   Orthostatic Blood Pressures      Most Recent Value   Blood Pressure  115/75 filed at 02/23/2021 0727   Patient Position - Orthostatic VS  Lying filed at 02/22/2021 0622        Systolic (02VNR), PDW:578 , Min:115 , YCQ:338     Diastolic (15IFV), AKR:69, Min:65, Max:89      Intake/Output Summary (Last 24 hours) at 2/23/2021 1016  Last data filed at 2/23/2021 0815  Gross per 24 hour   Intake 720 ml   Output --   Net 720 ml       Weight (last 2 days)     Date/Time   Weight    02/23/21 0600   (!) 167 (367 95)    02/22/21 1353   (!) 168 (369 71)    02/22/21 12:38:54   (!) 168 (369 71)    02/22/21 0931   (!) 159 (350)              Invasive Devices     Peripheral Intravenous Line            Peripheral IV 02/22/21 Right Antecubital less than 1 day                  Physical Exam  Vitals signs reviewed  Constitutional:       General: He is not in acute distress  Appearance: He is well-developed  He is obese  He is not diaphoretic  Comments: Morbidly obese male in no acute distress   HENT:      Head: Normocephalic and atraumatic  Eyes:      Pupils: Pupils are equal, round, and reactive to light  Neck:      Musculoskeletal: Normal range of motion  Vascular: JVD present  No carotid bruit  Cardiovascular:      Rate and Rhythm: Normal rate  Rhythm irregularly irregular  Pulses:           Radial pulses are 2+ on the right side and 2+ on the left side  Heart sounds: S1 normal and S2 normal  No murmur  Pulmonary:      Effort: No respiratory distress  Breath sounds: Normal breath sounds  No wheezing or rales  Abdominal:      General: There is distension  Palpations: Abdomen is soft  Tenderness: There is no abdominal tenderness  Musculoskeletal: Normal range of motion  Right lower leg: Edema (+2/+3 bilateral lower extremity edema) present  Left lower leg: Edema present  Skin:     General: Skin is warm and dry  Findings: No erythema  Neurological:      General: No focal deficit present  Mental Status: He is alert and oriented to person, place, and time     Psychiatric:         Mood and Affect: Mood normal          Behavior: Behavior normal              Laboratory Results:  Results from last 7 days   Lab Units 02/23/21  0527 02/23/21  0525 02/22/21  0945   CK TOTAL U/L 44  --   --    TROPONIN I ng/mL  --  0 03 0 03       CBC with diff:   Results from last 7 days   Lab Units 21  0814 21  0945   WBC Thousand/uL 6 26 5 81   HEMOGLOBIN g/dL 9 0* 9 7*   HEMATOCRIT % 29 5* 32 1*   MCV fL 90 90   PLATELETS Thousands/uL 169 198   MCH pg 27 5 27 1   MCHC g/dL 30 5* 30 2*   RDW % 17 0* 17 0*   MPV fL 10 5 10 5   NRBC AUTO /100 WBCs 0 0         CMP:  Results from last 7 days   Lab Units 21  0527 21  0945   POTASSIUM mmol/L 4 1 3 7   CHLORIDE mmol/L 97* 97*   CO2 mmol/L 31 33*   BUN mg/dL 20 18   CREATININE mg/dL 1 28 1 30   CALCIUM mg/dL 8 5 8 6   AST U/L 30 21   ALT U/L 16 17   ALK PHOS U/L 123* 126*   EGFR ml/min/1 73sq m 60 58         BMP:  Results from last 7 days   Lab Units 21  0527 21  0945   POTASSIUM mmol/L 4 1 3 7   CHLORIDE mmol/L 97* 97*   CO2 mmol/L 31 33*   BUN mg/dL 20 18   CREATININE mg/dL 1 28 1 30   CALCIUM mg/dL 8 5 8 6       BNP:  No results for input(s): BNP in the last 72 hours      Magnesium:   Results from last 7 days   Lab Units 21  0527 21  0945   MAGNESIUM mg/dL 2 2 2 1       Coags:   Results from last 7 days   Lab Units 21  0814 21  0945   PTT seconds  --  51*   INR  2 56* 2 37*       TSH:       Hemoglobin A1C       Lipid Profile:         Cardiac testing:   Results for orders placed during the hospital encounter of 21   Echo complete with contrast if indicated    Narrative [address]  [address] [place] [address]  [phone]    Transthoracic Echocardiogram  2D, M-mode, Doppler, and Color Doppler    Study date:  2021    Patient: Mazin Mann  MR number: [id number]  Account number: [de-identified]  : 1960  Age: 57 years  Gender: Male  Status: Inpatient  Location: Bedside  Height: 71 in  Weight: 350 lb  BP: 130/ 80 mmHg    Indications: shortness of breath    Diagnoses: 428 0 - CONGESTIVE HEART FAILURE    Sonographer:  Uriel 61, CCT  Primary Physician:  Odalis Joyce DO  Referring Physician:  Jones Hinds DO  Group:  [facility]  Interpreting Physician:  DO MAKI Perales    LEFT VENTRICLE:  Systolic function was normal  Ejection fraction was estimated to be 60 %  There were no regional wall motion abnormalities  Wall thickness was mildly increased  RIGHT VENTRICLE:  The ventricle was moderately dilated  Systolic function was mildly reduced  LEFT ATRIUM:  The atrium was mildly dilated  RIGHT ATRIUM:  The atrium was moderately dilated  MITRAL VALVE:  There was mild regurgitation  TRICUSPID VALVE:  There was moderate regurgitation  Estimated peak PA pressure was 52 mmHg  HISTORY: PRIOR HISTORY: CHF, morbid obesity    PROCEDURE: The procedure was performed at the bedside  This was a routine study  The transthoracic approach was used  The study included complete 2D imaging, M-mode, complete spectral Doppler, and color Doppler  The heart rate was 80 bpm,  at the start of the study  Echocardiographic views were limited due to poor patient compliance and poor acoustic window availability  This was a technically difficult study  LEFT VENTRICLE: Size was normal  Systolic function was normal  Ejection fraction was estimated to be 60 %  There were no regional wall motion abnormalities  Wall thickness was mildly increased  DOPPLER: The study was not technically  sufficient to allow evaluation of LV diastolic function  RIGHT VENTRICLE: The ventricle was moderately dilated  Systolic function was mildly reduced  LEFT ATRIUM: The atrium was mildly dilated  RIGHT ATRIUM: The atrium was moderately dilated  MITRAL VALVE: Valve structure was normal  There was normal leaflet separation  DOPPLER: The transmitral velocity was within the normal range  There was no evidence for stenosis  There was mild regurgitation  AORTIC VALVE: The valve was trileaflet  Leaflets exhibited normal thickness and normal cuspal separation  DOPPLER: Transaortic velocity was within the normal range   There was no evidence for stenosis  There was no regurgitation  TRICUSPID VALVE: The valve structure was normal  There was normal leaflet separation  DOPPLER: The transtricuspid velocity was within the normal range  There was no evidence for stenosis  There was moderate regurgitation  Estimated peak PA  pressure was 52 mmHg  PULMONIC VALVE: Leaflets exhibited normal thickness, no calcification, and normal cuspal separation  DOPPLER: The transpulmonic velocity was within the normal range  There was no regurgitation  PERICARDIUM: There was no pericardial effusion  The pericardium was normal in appearance  AORTA: The root exhibited normal size  SYSTEMIC VEINS: IVC: The inferior vena cava was not well visualized  SYSTEM MEASUREMENT TABLES    2D  %FS: 31 45 %  Ao Diam: 3 22 cm  EDV(Teich): 148 86 ml  EF(Teich): 58 74 %  ESV(Teich): 61 42 ml  IVSd: 1 51 cm  LA Area: 20 63 cm2  LA Diam: 5 65 cm  LVIDd: 5 52 cm  LVIDs: 3 79 cm  LVPWd: 1 08 cm  RA Area: 33 86 cm2  RWT: 0 39  SV(Teich): 87 44 ml    CW  RAP: 0 mmHg  TR Vmax: 3 47 m/s  TR maxP 33 mmHg    PW  E' Sept: 0 09 m/s  E/E' Sept: 13 35  MV A Ti: 0 48 m/s  MV Dec [place]: 4 5 m/s2  MV DecT: 253 15 ms  MV E Ti: 1 14 m/s  MV E/A Ratio: 2 36  RVSP: 48 33 mmHg    Inters\[facility]\"" Commission Accredited Echocardiography Laboratory    Prepared and electronically signed by    Beba Lr DO  Signed 2021 08:06:00       No results found for this or any previous visit  No results found for this or any previous visit  No results found for this or any previous visit  Imaging: I have personally reviewed pertinent reports  Xr Chest Portable    Result Date: 2021  Narrative: CHEST INDICATION:   pleural effusion  COMPARISON:  Chest 2020, CT chest abdomen pelvis 2021   EXAM PERFORMED/VIEWS:  XR CHEST PORTABLE FINDINGS: Heart shadow is enlarged but unchanged from prior exam  There is a right basilar opacity with blunting of the right costophrenic angle  Osseous structures appear within normal limits for patient age  Impression: 1  Persistent right basilar opacity shown on recent CT to represent a large right pleural effusion with collapse of the right middle and lower lobes  2   Stable enlargement of the cardiac silhouette  Workstation performed: [id number]     Ct Chest Abdomen Pelvis W Contrast    Result Date: 2/22/2021  Narrative: CT CHEST, ABDOMEN AND PELVIS WITH IV CONTRAST INDICATION:   Patient with shortness of breath, need to evaluate for volume overload of the lungs as well as volume status of the abdomen, potential ascites  COMPARISON:  Right upper quadrant ultrasound dated 12/2/2020  CT of the chest, abdomen, and pelvis dated 8/21/2020  TECHNIQUE: CT examination of the chest, abdomen and pelvis was performed  Axial, sagittal, and coronal 2D reformatted images were created from the source data and submitted for interpretation  Radiation dose length product (DLP) for this visit:  2607 22 mGy-cm   This examination, like all CT scans performed in the [facility], was performed utilizing techniques to minimize radiation dose exposure, including the use of iterative reconstruction and automated exposure control  IV Contrast:  100 mL of iohexol (OMNIPAQUE) Enteric Contrast: Enteric contrast was not administered  FINDINGS: CHEST PLEURA:  Moderate to large right pleural effusion  No definite loculated components  No left-sided effusion  No pneumothorax on either side  LUNGS: Near collapse of the right lower lobe  Lateral segmental collapse in the right middle lobe  Groundglass opacity posteriorly in the upper lobe also attributable to subsegmental compressive atelectasis from adjacent effusion  No other acute consolidations  No interlobular septal thickening  No endobronchial lesions  No suspicious pulmonary nodules  HEART/GREAT VESSELS:  Four-chamber enlargement of the heart  Coronary calcifications    No pericardial thickening or effusion  Thoracic aorta is unremarkable  MEDIASTINUM AND DILLON:  Unremarkable  CHEST WALL AND LOWER NECK:   Moderate diffuse gynecomastia requiring no additional imaging workup  ABDOMEN LIVER/BILIARY TREE:  Liver appears enlarged and lobular in contour  Parenchyma is mildly heterogeneous  Findings may suggest cirrhosis  No focal masses or biliary ductal dilatation  GALLBLADDER:  There are gallstone(s) within the gallbladder, without pericholecystic inflammatory changes  SPLEEN:  Spleen is enlarged  Chronic, unchanged calcified low-density contour abnormality, laterally, potentially related to prior trauma or infarct  No suspicious splenic masses  PANCREAS:  Unremarkable  ADRENAL GLANDS:  Unremarkable  KIDNEYS/URETERS:  Subcentimeter hypoenhancing lesions are too small to characterize but are Bosniak II benign lesions by 2019 revised criteria  No suspicious parenchymal masses, perinephric collections, urolithiasis, or hydronephrosis  STOMACH AND BOWEL: Small hiatal hernia  No gastric wall thickening  No dilated or inflamed loops of small bowel  Colonic diverticula without associated wall thickening or pericolonic inflammation  APPENDIX:  A normal appendix was visualized  ABDOMINOPELVIC CAVITY:  Mild ascites, predominantly in the upper and central abdomen  No encapsulated collections or free air  No mesenteric, retroperitoneal, or pelvic sidewall lymphadenopathy  VESSELS:  Abdominal aorta and branch vessels are unremarkable for patient's age  Engorged appearance of the IVC  Superficial varicose veins demonstrated in both proximal thighs /inguinal regions  PELVIS REPRODUCTIVE ORGANS:  Unremarkable for patient's age  URINARY BLADDER:  Normal when accounting for degree of incomplete distention  ABDOMINAL WALL/INGUINAL REGIONS:  Fat-containing left inguinal hernia  Extensive unencapsulated body wall edema, greatest ventrally   OSSEOUS STRUCTURES:  No acute fracture or destructive osseous lesion  Impression: 1  Moderate-sized to large right pleural effusion with multisegmental compressive collapse in the right middle and lower lobes  2   Other findings of volume overload including engorgement of the IVC, mild ascites, and subcutaneous edema  3   Antidependent distribution of body wall soft tissue swelling is somewhat atypical for anasarca  Correlate for evidence of cellulitis  4   Chronic abdominopelvic findings including hepatomegaly and probable cirrhosis, cholelithiasis, subcapsular cystic lesion in the spleen (probably related to old trauma), and colonic diverticulosis  No acute or inflammatory findings in the abdomen or pelvis   Workstation performed: [id number]       EKG reviewed personally: atrial fibrillation, low voltage, incomplete RBBB  Telemetry reviewed personally: atrial fibrillation, occasional PVC's    Assessment:  Principal Problem:    Acute on chronic diastolic CHF (congestive heart failure) (HCC)  Active Problems:    STEFF (obstructive sleep apnea)    Pleural effusion on right    Cirrhosis of liver with ascites (HCC)    CKD (chronic kidney disease) stage 3, GFR 30-59 ml/min    Lactic acidosis    Permanent atrial fibrillation (HCC)    Diverticulosis of colon    Lesion of spleen    Type 2 diabetes mellitus with hyperglycemia, without long-term current use of insulin (HCC)    Hypoxia    Tobacco use    Hyponatremia      Code Status: Level 1 - Full Code

## 2021-02-23 NOTE — ASSESSMENT & PLAN NOTE
· Consult Gastroenterology  · Avoid all hepatotoxic agents  · Outpatient hepatocellular carcinoma surveillance with Gastroenterology

## 2021-02-23 NOTE — RESPIRATORY THERAPY NOTE
02/22/21 3347   Non-Invasive Information   O2 Interface Device Full face mask  (medium)   Non-Invasive Ventilation Mode BiPAP   $ Intermittent NIV Yes   SpO2 94 %   $ Pulse Oximetry Spot Check Charge Completed   Resp Comments patient home settings   Non-Invasive Settings   IPAP (cm) 17 cm   EPAP (cm) 11 cm   Rate (Set) 4   FiO2 (%) 21   Pressure Support (cm H2O) 6   Rise Time 3   Trigger Sensitivity Flow (lpm) 3   Inspiratory Time (Set) 0 9   Non-Invasive Readings   Total Rate 24   Vt (mL) ([facility]) 563   MV ([facility]) 13 7   Peak Pressure (Obs) 17   Skin Intervention Skin intact;Mask rotated   Non-Invasive Alarms   MV Low (L/min) 4   High Resp Rate (BPM) 45 BPM   Apnea Interval (sec) 30     Patient now on bipap settings, home settings 17/11 with room air, oxygen saturation 94%    Earlier patient received on room air, oxygen saturation 93 on room air, BS: clear diminished, but very diminished RLL   Patient denying sob,

## 2021-02-23 NOTE — PHYSICAL THERAPY NOTE
Physical Therapy Evaluation    Patient Name: Catalina Paige    ZPCRU'X Date: 2/23/2021     Problem List  Principal Problem:    Acute on chronic diastolic CHF (congestive heart failure) (HCC)  Active Problems:    STEFF (obstructive sleep apnea)    Pleural effusion on right    Cirrhosis of liver with ascites (HCC)    CKD (chronic kidney disease) stage 3, GFR 30-59 ml/min    Lactic acidosis    Permanent atrial fibrillation ([facility] [address] )    Diverticulosis of colon    Lesion of spleen    Type 2 diabetes mellitus with hyperglycemia, without long-term current use of insulin (HCC)    Hypoxia    Tobacco use    Hyponatremia       Past Medical History  Past Medical History:   Diagnosis Date    A-fib [facility])     Cardiac disease     Chronic combined systolic (congestive) and diastolic (congestive) heart failure (HCC)     [facility]     Dilated cardiomyopathy ([facility] )     History of echocardiogram 11/24/2014    EF 0 30 (30%), Likely mod LV systolic dysfunction  Likely RV dysfunction as well   History of Lexiscan MPI 02/19/2016    EF 0 43 (43%), no prior MI or ischemia   Hx of echocardiogram 04/28/2017    Normal EF, Normal LV systolic function  Mild concentric LV hypertrophy  Mild mitral and tricuspid regurg      Hx: recurrent pneumonia     Hypertension     Long term (current) use of anticoagulants     Morbid (severe) obesity due to excess calories (HCC)     Neuropathy         Past Surgical History  Past Surgical History:   Procedure Laterality Date    HERNIA REPAIR      IR THORACENTESIS  8/25/2020    SPLENECTOMY, TOTAL      VASCULAR SURGERY Right     leg           02/23/21 1048   PT Last Visit   PT Visit Date 02/23/21   Note Type   Note type Evaluation   Pain Assessment   Pain Assessment Tool 0-10   Pain Score Worst Possible Pain   Pain Location/Orientation Orientation: Right;Other (Comment)  (lung)   Home Living   Type of [address] Two level;Bed/bath upstairs;Stairs to enter with rails  (4 EZEKIEL c HR)   Bathroom Shower/Tub Tub/shower unit   Bathroom Toilet Standard   Bathroom Accessibility Accessible   Home Equipment Cane   Additional Comments pt reports occasional use of SPC at baseline   Prior Function   Level of [place] Independent with ADLs and functional mobility; Needs assistance with ADLs and functional mobility   Lives With Spouse   Receives Help From Family   ADL Assistance Independent   IADLs Needs assistance   Comments + driving   Restrictions/Precautions   Weight Bearing Precautions Per Order No   Other Precautions Pain; Fall Risk;Multiple lines;Telemetry; Chair Alarm   General   Family/Caregiver Present No   Cognition   Overall Cognitive Status WFL   Attention Within functional limits   Orientation Level Oriented X4   Following Commands Follows all commands and directions without difficulty   RLE Assessment   RLE Assessment WFL   LLE Assessment   LLE Assessment WFL   Bed Mobility   Additional Comments pt OOB at start/end of session   Transfers   Sit to Stand 5  Supervision   Additional items Verbal cues   Stand to Sit 5  Supervision   Additional items Verbal cues   Additional Comments performed with [facility]   Ambulation/Elevation   Gait pattern Excessively slow; Short stride; Foward flexed;Decreased foot clearance   Gait Assistance 5  Supervision   Additional items Verbal cues   Assistive Device [facility]   Distance 50'   Stair Management Assistance Not tested   Balance   Static Sitting Fair +   Dynamic Sitting Fair +   Static Standing Fair   Dynamic Standing Fair   Ambulatory Fair -   Endurance Deficit   Endurance Deficit Yes   Endurance Deficit Description SOB with exertional activities   Activity Tolerance   Activity Tolerance Patient limited by fatigue;Patient limited by pain   Assessment   Prognosis Good   Problem List Decreased strength;Decreased endurance; Impaired balance;Decreased mobility;Pain;Obesity   Assessment Patient is a 57 y o  male evaluated by Physical Therapy s/p admit to [address] on 2/22/2021 with admitting diagnosis of: Atrial fibrillation, SOB (shortness of breath), Pleural effusion, Hypoxia, Ascites, and principal problem of: Acute on chronic diastolic CHF (congestive heart failure)  PT was consulted to assess patient's functional mobility and discharge needs  Ordered are PT Evaluation and treatment with activity level of: up with assistance  Comorbidities affecting patient's physical performance at time of assessment include: a-fib, DM, neuropathy, HTN, obesity, CHF  Personal factors affecting the patient at time of IE include: lives in 2 story home, ambulating with assistive device, step(s) to enter home, inability to navigate community distances, inability/difficulty performing IADLs and inability/difficulty performing ADLs  Please locate objective findings from PT assessment regarding body systems outlined above  Upon evaluation, pt able to perform all functional mobility with SUP, SPC, and increased time  Occasional verbal cuing provided for safety and sequencing  Pt fatigued at this time and required seated rest break following 50' of ambulation; no LOB experienced  HR and SpO2 remained WFL on RA throughout, despite SOB and fatigue  The patient's AM-PAC Basic Mobility Inpatient Short Form Raw Score is 23, Standardized Score is 50  88  A standardized score greater than 42 9 suggests the patient may benefit from discharge to home  Please also refer to the recommendation of the Physical Therapist for safe discharge planning  Pt will benefit from continued PT intervention during LOS to address current deficits, increase LOF, and facilitate safe d/c to next level of care when medically appropriate  D/c recommendation at this time is home PT  Goals   Patient Goals to go home   Short Term Goal #1 Pt will participate in B LE strengthening exercises to facilitate improved functional mobility     Short Term Goal #2 Pt will perform all functional transfers and bed mobility mod(I) with good safety awareness  LTG Expiration Date 03/09/21   Long Term Goal #1 Pt will ambulate 200' with SPC and SUP while maintaining good functional dynamic balance  Long Term Goal #2 Pt will ascend/descend 4 stairs with HR and SUP to reflect the ability to safely enter his home  Plan   Treatment/Interventions LE strengthening/ROM; Functional transfer training;Elevations; Therapeutic exercise; Endurance training;Gait training;Bed mobility   PT Frequency Other (Comment)  (3-5x/week)   Recommendation   PT Discharge Recommendation Home with skilled therapy   AM-PAC Basic Mobility Inpatient   Turning in Bed Without Bedrails 4   Lying on Back to Sitting on Edge of Flat Bed 4   Moving Bed to Chair 4   Standing Up From Chair 4   Walk in Room 4   Climb 3-5 Stairs 3   Basic Mobility Inpatient Raw Score 23   Basic Mobility Standardized Score 50 88     Pt seated in recliner at end of session with all needs in reach

## 2021-02-23 NOTE — RESPIRATORY THERAPY NOTE
BIPAP therapy stopped     Nurse's aide stated patient took mask off and wanted to stop the therapy  Patient told me, he just can't get used to our mask, that he will have his wife bring his machine in later today       Will notify his nurse

## 2021-02-23 NOTE — ASSESSMENT & PLAN NOTE
· Likely secondary to chronic metformin use  · Resolved with thiamine 200 mg IV x 1 dose on 02/22/2021

## 2021-02-23 NOTE — CASE MANAGEMENT
Met with pt to discuss role as  in helping pt to develop discharge plan and to help pt carry out their plan  Pt's current LOS is  1 day   Pt is a Risk of Unplanned Readmission score of  15  Pt lives in a house with his wife  Pt has 4 EZEKIEL with a railiing  Pt has 13 steps on the inside  Pt has his bedroom and bathroom on the 2nd floor  Pt has a SPC and a BSC at home  [facility]  Pt uses the SPC to ambulate  Pt is independent with ADL's and functional mobility  Pt has a scale but has not been weighing himself because he does not feel the scale works accurately  Pt's wife does the cooking and cleaning  Pt drives  Pt has never had home care or any services in the home  Pt's PCP is [employer]  Pt's Cardiologist is [employer]  Pt's sleep doctor is Dr Lynette Crocker  Pt uses the [facility] in [place]  Pt would benefit from having home care nursing on discharge  Pt will need to get a scale that works   I will continue to follow for any other Case Management needs

## 2021-02-23 NOTE — ASSESSMENT & PLAN NOTE
· Likely hypervolemic hyponatremia  · Continue lasix 40 mg IV TID  · Follow the sodium level    Results from last 7 days   Lab Units 02/23/21  0527 02/22/21  0945   SODIUM mmol/L 135* 136   POTASSIUM mmol/L 4 1 3 7   CHLORIDE mmol/L 97* 97*   CO2 mmol/L 31 33*   BUN mg/dL 20 18   CREATININE mg/dL 1 28 1 30   CALCIUM mg/dL 8 5 8 6

## 2021-02-23 NOTE — PROGRESS NOTES
Progress Note - Jewels Mackay 1960, 57 y o  male MRN: [id number]    Unit/Bed#: 410-01 Encounter: [id number]    Primary Care Provider: Jac Wells DO   Date and time admitted to hospital: 2/22/2021  9:27 AM        * Acute on chronic diastolic CHF (congestive heart failure) [facility])  Assessment & Plan  Wt Readings from Last 3 Encounters:   02/23/21 (!) 167 kg (367 lb 15 2 oz)   01/13/21 (!) 159 kg (351 lb 6 4 oz)   01/12/21 (!) 161 kg (356 lb)     · Approximately 30 lbs   weight gain over the last few months  · Follow troponin levels  · Continue lasix 40 mg IV TID  · Continue coreg 6 25 mg PO BID  · Consult Cardiology  · Daily weights  · Strict intake/output measurements  · PT/OT        Pleural effusion on right  Assessment & Plan  · Recurrent right-sided pleural effusion  · Continue lasix 40 mg IV TID  · Consult Intervention Radiology for a right-sided thoracentesis on 02/23/2021  · Consult Pulmonology    Hypoxia  Assessment & Plan  · Initially required 4 lpm of continuous supplemental oxygen via the nasal cannula to maintain oxygen saturations at 92% and above  · Successfully weaned off supplemental oxygen  · Secondary to right-sided pleural effusion  · Respiratory protocol  · Incentive spirometry 10 times per hour while awake    Cirrhosis of liver with ascites ([facility] [address] )  Assessment & Plan  · Consult Gastroenterology  · Avoid all hepatotoxic agents  · Outpatient hepatocellular carcinoma surveillance with Gastroenterology    Hyponatremia  Assessment & Plan  · Likely hypervolemic hyponatremia  · Continue lasix 40 mg IV TID  · Follow the sodium level    Results from last 7 days   Lab Units 02/23/21  0527 02/22/21  0945   SODIUM mmol/L 135* 136   POTASSIUM mmol/L 4 1 3 7   CHLORIDE mmol/L 97* 97*   CO2 mmol/L 31 33*   BUN mg/dL 20 18   CREATININE mg/dL 1 28 1 30   CALCIUM mg/dL 8 5 8 6         Tobacco use  Assessment & Plan  · Nicotine patch  · Smoking cessation counseling    Type 2 diabetes mellitus with hyperglycemia, without long-term current use of insulin [facility])  Assessment & Plan  Lab Results   Component Value Date    HGBA1C 6 1 (H) 01/05/2021       Recent Labs     02/22/21  1522 02/22/21 2037 02/23/21  0726   POCGLU 154* 170* 132       Blood Sugar Average: Last 72 hrs:  (P) 152   · Hold metformin in the setting of a lactic acidosis  · Consider the initiation of an ACE-inhibitor for renal protection in the setting of type 2 diabetes mellitus  · Insulin sliding scale with blood glucose monitoring ACHS    Lesion of spleen  Assessment & Plan  · Outpatient surveillance imaging with his PCP    Diverticulosis of colon  Assessment & Plan  · Outpatient follow-up with Gastroenterology    Permanent atrial fibrillation [facility])  Assessment & Plan  · Continue PO coreg and PO digoxin  · Continue anticoagulation with PO coumadin for a goal INR of 2-3  · Outpatient follow-up with Cardiology    Lactic acidosis  Assessment & Plan  · Likely secondary to chronic metformin use  · Resolved with thiamine 200 mg IV x 1 dose on 02/22/2021    CKD (chronic kidney disease) stage 3, GFR 30-59 ml/min  Assessment & Plan  Lab Results   Component Value Date    EGFR 60 02/23/2021    EGFR 58 02/22/2021    EGFR 60 01/05/2021    CREATININE 1 28 02/23/2021    CREATININE 1 30 02/22/2021    CREATININE 1 28 01/05/2021     · Baseline serum creatinine of 1 3-1 6 mg/dl  · Avoid all nephrotoxic agents  · Serial laboratory testing to monitor the patient's renal function and electrolytes    STEFF (obstructive sleep apnea)  Assessment & Plan  · Continue BIPAP at 17/11 QHS and anytime while sleeping        VTE Pharmacologic Prophylaxis:   Pharmacologic: Warfarin (Coumadin) for a goal INR of 2-3  Mechanical VTE Prophylaxis in Place: Yes    Patient Centered Rounds: I have performed bedside rounds with nursing staff today  Time Spent for Care: 30 minutes  More than 50% of total time spent on counseling and coordination of care as described above      Current Length of Stay: 1 day(s)    Current Patient Status: Inpatient   Certification Statement: The patient will continue to require additional inpatient hospital stay due to the need for IV lasix treatment and for a right-sided thoracentesis today by Interventional Radiology  Code Status: Level 1 - Full Code      Subjective: The patient was seen and examined  The patient is doing better  The shortness of breath is improving  No chest pain  Objective:     Vitals:   Temp (24hrs), Av °F (36 7 °C), Min:97 4 °F (36 3 °C), Max:98 3 °F (36 8 °C)    Temp:  [97 4 °F (36 3 °C)-98 3 °F (36 8 °C)] 97 4 °F (36 3 °C)  HR:  [65-77] 65  Resp:  [17-18] 18  BP: (115-162)/(65-89) 115/75  SpO2:  [93 %-100 %] 98 %  Body mass index is 51 32 kg/m²  Input and Output Summary (last 24 hours):        Intake/Output Summary (Last 24 hours) at 2021 1028  Last data filed at 2021 0815  Gross per 24 hour   Intake 720 ml   Output --   Net 720 ml       Physical Exam:     Physical Exam  General:  NAD, follows commands  HEENT:  NC/AT, mucous membranes moist  Neck:  Supple, No JVP elevation  CV:  + S1, + S2, Irregularly irregular rhythm, Regular rate  Pulm:  Decreased breath sounds at the right base, Crackles at the left base  Abd:  Soft, Non-tender, Non-distended  Ext:  No clubbing/cyanosis/edema  Skin:  No rashes  Neuro:  Awake, alert, oriented  Psych:  Normal mood and affect      Additional Data:    Labs:    Results from last 7 days   Lab Units 21  0814   WBC Thousand/uL 6 26   HEMOGLOBIN g/dL 9 0*   HEMATOCRIT % 29 5*   PLATELETS Thousands/uL 169   NEUTROS PCT % 76*   LYMPHS PCT % 12*   MONOS PCT % 8   EOS PCT % 3     Results from last 7 days   Lab Units 21  0527   SODIUM mmol/L 135*   POTASSIUM mmol/L 4 1   CHLORIDE mmol/L 97*   CO2 mmol/L 31   BUN mg/dL 20   CREATININE mg/dL 1 28   ANION GAP mmol/L 7   CALCIUM mg/dL 8 5   ALBUMIN g/dL 2 5*   TOTAL BILIRUBIN mg/dL 0 90   ALK PHOS U/L 123*   ALT U/L 16   AST U/L 30 GLUCOSE RANDOM mg/dL 129     Results from last 7 days   Lab Units 02/23/21  0814   INR  2 56*     Results from last 7 days   Lab Units 02/23/21  0726 02/22/21  2037 02/22/21  1522   POC GLUCOSE mg/dl 132 170* 154*         Results from last 7 days   Lab Units 02/23/21  0527 02/22/21  0945   LACTIC ACID mmol/L 0 9 2 1*           * I Have Reviewed All Lab Data Listed Above  * Additional Pertinent Lab Tests Reviewed: Zac [facility] Admission Reviewed      Recent Cultures (last 7 days):     Results from last 7 days   Lab Units 02/22/21  1159 02/22/21  0945   BLOOD CULTURE  Received in Microbiology Lab  Culture in Progress  Received in Microbiology Lab  Culture in Progress  Last 24 Hours Medication List:   Current Facility-Administered Medications   Medication Dose Route Frequency Provider Last Rate    carvedilol  6 25 mg Oral BID With Meals Kathrene Early, DO      digoxin  250 mcg Oral Daily Kathrene Early, DO      furosemide  40 mg Intravenous TID (diuretic) Joycehrha Early, DO      insulin lispro  1-6 Units Subcutaneous HS Kathrene Early, DO      insulin lispro  2-12 Units Subcutaneous TID AC Joycehrha Early, DO      nicotine  1 patch Transdermal Daily Kathrene Early, DO      pantoprazole  40 mg Oral Early Morning Kathrene Early, DO      warfarin  5 mg Oral Daily (warfarin) Marian Early, DO          Today, Patient Was Seen By: Marian Sr DO    ** Please Note: Dictation voice to text software may have been used in the creation of this document   **

## 2021-02-23 NOTE — UTILIZATION REVIEW
Initial Clinical Review    Admission: Date/Time/Statement:   Admission Orders (From admission, onward)     Ordered        02/22/21 1140  Inpatient Admission  Once                   Orders Placed This Encounter   Procedures    Inpatient Admission     Standing Status:   Standing     Number of Occurrences:   1     Order Specific Question:   Level of Care     Answer:   Med Surg [16]     Order Specific Question:   Estimated length of stay     Answer:   More than 2 Midnights     Order Specific Question:   Certification     Answer:   I certify that inpatient services are medically necessary for this patient for a duration of greater than two midnights  See H&P and MD Progress Notes for additional information about the patient's course of treatment  ED Arrival Information     Expected Arrival Acuity Means of Arrival Escorted By Service Admission Type    - 2/22/2021 09:24 Emergent Walk-In Self Hospitalist Emergency    Arrival Complaint    Sob        Chief Complaint   Patient presents with    Shortness of Breath     patient reports sob increasing over the past several months  he reports that he is filling up with fluids and taking lasix  Assessment/Plan:   57 yom to ER from home c/o increasing SOB for few weeks with generalized fatigue, increasing abd distention, 30# wt gain, & BLE edema  The patient was instructed to take a double dose of his PO lasix along with PO metolazone twice a week, however, the patient was non-compliant with this instruction secondary to not being able to climb his stairs frequently to reach his bathroom  Hx afib, CKD, CHF, DM, HTN  Presents hypoxic with s/s as above  Admission work-up showing INR>2, elevated BNP, pleural effusions & ascites on imaging  Admitted to inpatient status for acute on chronic CHF, started on IV diuresis with lasix tid, placed on fluid restriction  2/23:  Persistent SOB  Remains on IV diuresis tid  IR consulted for thoracentesis  Fluid restriction maintained  Per cardio:  Patient appears to be at least 40 pounds above his dry weight  He has significant volume overload with significant lower extremity edema and abdominal wall ascites as evidence by CT  IR guided thoracentesis for 1600cc pleural fluid  He will need at least 5-7 days of IV diuresis  The most likely etiology of his volume overload is chronic RV dysfunction  He has been using CPAP on a daily basis but is likely resistant to his diuretic dosing,  and was missing some dose  Dietary indiscretion is also an active problem      Thoracentesis 1600cc clear adonay fluid removed    ED Triage Vitals   Temperature Pulse Respirations Blood Pressure SpO2   02/22/21 0931 02/22/21 0931 02/22/21 0931 02/22/21 0931 02/22/21 0931   98 3 °F (36 8 °C) 86 22 148/89 (!) 87 %      Temp Source Heart Rate Source Patient Position - Orthostatic VS BP Location FiO2 (%)   02/22/21 0931 02/22/21 0931 02/22/21 0931 02/22/21 0931 --   Temporal Monitor Sitting Left arm       Pain Score       02/22/21 1335       2          Wt Readings from Last 1 Encounters:   02/23/21 (!) 167 kg (367 lb 15 2 oz)     Additional Vital Signs:   02/22/21 23:13:15  98 1 °F (36 7 °C)  69  18  141/75  97  96 %  --  --  CPAP  --  Lying   02/22/21 2308  --  --  --  --  --  94 %  --  --  Other (comment)  Full face mask   --   O2 Interface Device: medium at 02/22/21 2308 02/22/21 2023  --  --  --  --  --  --  --  --  None (Room air)  --  --   02/22/21 17:43:29  98 1 °F (36 7 °C)  69  18  145/75  98  94 %  --  --  --  --  --   02/22/21 15:23:06  98 2 °F (36 8 °C)  74  18  150/77  101  93 %  --  --  --  --  --   02/22/21 1434  --  76  18  --  --  95 %  --  --  None (Room air)  --  --   02/22/21 12:38:54  98 3 °F (36 8 °C)  75  17  162/89  113  95 %  --  --  None (Room air)  --  Sitting   02/22/21 1130  --  77  18  155/67  97  100 %  36  4 L/min  Nasal cannula  --  Lying     Pertinent Labs/Diagnostic Test Results:   Results from last 7 days   Lab Units 02/22/21  0947   SARS-COV-2  Negative     Results from last 7 days   Lab Units 02/23/21  0814 02/22/21  0945   WBC Thousand/uL 6 26 5 81   HEMOGLOBIN g/dL 9 0* 9 7*   HEMATOCRIT % 29 5* 32 1*   PLATELETS Thousands/uL 169 198   NEUTROS ABS Thousands/µL 4 79 4 22     Results from last 7 days   Lab Units 02/23/21  0527 02/22/21  0945   SODIUM mmol/L 135* 136   POTASSIUM mmol/L 4 1 3 7   CHLORIDE mmol/L 97* 97*   CO2 mmol/L 31 33*   ANION GAP mmol/L 7 6   BUN mg/dL 20 18   CREATININE mg/dL 1 28 1 30   EGFR ml/min/1 73sq m 60 58   CALCIUM mg/dL 8 5 8 6   MAGNESIUM mg/dL 2 2 2 1     Results from last 7 days   Lab Units 02/23/21  0527 02/22/21  0945   AST U/L 30 21   ALT U/L 16 17   ALK PHOS U/L 123* 126*   TOTAL PROTEIN g/dL 7 6 7 4   ALBUMIN g/dL 2 5* 2 6*   TOTAL BILIRUBIN mg/dL 0 90 0 60     Results from last 7 days   Lab Units 02/23/21  0726 02/22/21  2037 02/22/21  1522   POC GLUCOSE mg/dl 132 170* 154*     Results from last 7 days   Lab Units 02/23/21  0527 02/22/21  0945   GLUCOSE RANDOM mg/dL 129 148*     Results from last 7 days   Lab Units 02/23/21  0527   CK TOTAL U/L 44     Results from last 7 days   Lab Units 02/23/21  0525 02/22/21  0945   TROPONIN I ng/mL 0 03 0 03     Results from last 7 days   Lab Units 02/23/21  0814 02/22/21  0945   PROTIME seconds 26 9* 25 4*   INR  2 56* 2 37*   PTT seconds  --  51*     Results from last 7 days   Lab Units 02/23/21  0527 02/22/21  0945   LACTIC ACID mmol/L 0 9 2 1*     Results from last 7 days   Lab Units 02/23/21  0527 02/22/21  0945   DIGOXIN LVL ng/mL 1 7 1 6     Results from last 7 days   Lab Units 02/22/21  0945   NT-PRO BNP pg/mL 1,667*     Results from last 7 days   Lab Units 02/22/21  0945   LIPASE u/L 291     Results from last 7 days   Lab Units 02/22/21  0947   INFLUENZA A PCR  Negative   INFLUENZA B PCR  Negative   RSV PCR  Negative     Results from last 7 days   Lab Units 02/22/21  1159 02/22/21  0945   BLOOD CULTURE  Received in Microbiology Lab   Culture in Progress  Received in Microbiology Lab  Culture in Progress  Results from last 7 days   Lab Units 02/23/21  1016   WBC FLUID /ul 117     2/22  Ct a/p=  1  Moderate-sized to large right pleural effusion with multisegmental compressive collapse in the right middle and lower lobes  2   Other findings of volume overload including engorgement of the IVC, mild ascites, and subcutaneous edema  3   Antidependent distribution of body wall soft tissue swelling is somewhat atypical for anasarca  Correlate for evidence of cellulitis  4   Chronic abdominopelvic findings including hepatomegaly and probable cirrhosis, cholelithiasis, subcapsular cystic lesion in the spleen (probably related to old trauma), and colonic diverticulosis  No acute or inflammatory findings in the abdomen or pelvis  Ekg=  Atrial fibrillation with a heart rate of 76 bpm     2/23  Cxr=  1  Persistent right basilar opacity shown on recent CT to represent a large right pleural effusion with collapse of the right middle and lower lobes  2   Stable enlargement of the cardiac silhouette  ED Treatment:   Medication Administration from 02/22/2021 0924 to 02/22/2021 1234       Date/Time Order Dose Route Action     02/22/2021 0951 ipratropium-albuterol (DUO-NEB) 0 5-2 5 mg/3 mL inhalation solution 3 mL 3 mL Nebulization Given     02/22/2021 1029 iohexol (OMNIPAQUE) 350 MG/ML injection (MULTI-DOSE) 100 mL 100 mL Intravenous Given     02/22/2021 1206 furosemide (LASIX) injection 40 mg 40 mg Intravenous Given        Past Medical History:   Diagnosis Date    A-fib [facility])     Cardiac disease     Chronic combined systolic (congestive) and diastolic (congestive) heart failure (HCC)     Diabetes mellitus ([facility] )     Dilated cardiomyopathy ([facility] )     History of echocardiogram 11/24/2014    EF 0 30 (30%), Likely mod LV systolic dysfunction  Likely RV dysfunction as well   History of Lexiscan MPI 02/19/2016    EF 0 43 (43%), no prior MI or ischemia   Hx of echocardiogram 04/28/2017    Normal EF, Normal LV systolic function  Mild concentric LV hypertrophy  Mild mitral and tricuspid regurg   Hx: recurrent pneumonia     Hypertension     Long term (current) use of anticoagulants     Morbid (severe) obesity due to excess calories (HCC)     Neuropathy      Present on Admission:   STEFF (obstructive sleep apnea)   CKD (chronic kidney disease) stage 3, GFR 30-59 ml/min   Cirrhosis of liver with ascites (HCC)   Pleural effusion on right    Admitting Diagnosis: Atrial fibrillation (HCC) [I48 91]  SOB (shortness of breath) [R06 02]  Pleural effusion [J90]  Hypoxia [R09 02]  Ascites [R18 8]  Age/Sex: 57 y o  male  Admission Orders:  Cont pulse ox  Consult GI  Pt/ot eval & tx  scd  Incentive spirometry  accuchecks with coverage scale  Telemetry  Consult cardio  Consult pulmonary  1800cc fluid restriction    Scheduled Medications:  carvedilol, 6 25 mg, Oral, BID With Meals  digoxin, 250 mcg, Oral, Daily  furosemide, 40 mg, Intravenous, TID (diuretic)  insulin lispro, 1-6 Units, Subcutaneous, HS  insulin lispro, 2-12 Units, Subcutaneous, TID AC  nicotine, 1 patch, Transdermal, Daily  pantoprazole, 40 mg, Oral, Early Morning  warfarin, 5 mg, Oral, Daily (warfarin)    Network Utilization Review Department  ATTENTION: Please call with any questions or concerns to [phone] and carefully listen to the prompts so that you are directed to the right person  All voicemails are confidential   Katherine Chowdhury all requests for admission clinical reviews, approved or denied determinations and any other requests to dedicated fax number below belonging to the campus where the patient is receiving treatment   List of dedicated fax numbers for the Facilities:  [address] DENIALS (Administrative/Medical Necessity) [phone]   [address] (Maternity/NICU/Pediatrics) 261 [facility],[address] [facility] [phone]   Sven Austin [address]  [phone]   Gaby Otero [address] [address] [address] [place] [address] [phone]   [address] 951 [phone]

## 2021-02-23 NOTE — PLAN OF CARE
Problem: OCCUPATIONAL THERAPY ADULT  Goal: Performs self-care activities at highest level of function for planned discharge setting  See evaluation for individualized goals  Description: Treatment Interventions: ADL retraining, Functional transfer training, UE strengthening/ROM, Endurance training, Patient/family training, Equipment evaluation/education, Activityengagement, Energy conservation          See flowsheet documentation for full assessment, interventions and recommendations  Note: Limitation: Decreased ADL status, Decreased UE strength, Decreased endurance, Decreased self-care trans, Decreased high-level ADLs     Assessment: Pt is a 57 y o  male seen for OT evaluation s/p admit to [facility] on 2/22/2021 w/ Acute on chronic diastolic CHF (congestive heart failure) ([facility] [address] )  Comorbidities affecting pt's functional performance at time of assessment include: DM, HTN and CKD, STEFF, tobacco abuse, obesity, CHF, hypokalemia, anemia   Personal factors affecting pt at time of IE include:steps to enter environment, difficulty performing ADLS, difficulty performing IADLS , limited insight into deficits, compliance, decreased initiation and engagement  and health management   Prior to admission, pt was (I) for ADLs, (A) for IADLs, and FM with SPC  Upon evaluation: Pt requires (S) for transfers, FM with SPC ~50 ft and max (A) for LB dressing  2* the following deficits impacting occupational performance: weakness, decreased strength, decreased balance, decreased tolerance, impaired initiation, decreased safety awareness, increased pain, impaired interpersonal skills and decreased coping skills  Pt to benefit from continued skilled OT tx while in the hospital to address deficits as defined above and maximize level of functional independence w ADL's and functional mobility   Occupational Performance areas to address include: grooming, bathing/shower, toilet hygiene, dressing, medication management, socialization, health maintenance, functional mobility, community mobility and clothing management  From OT standpoint, recommendation at time of d/c would be home with [address] [place] [address]         OT Discharge Recommendation: Home with skilled therapy(HHOT)  OT - OK to Discharge: Yes(When medically cleared)

## 2021-02-23 NOTE — CONSULTS
Consultation - [facility]) [facility]  Lashonda Lopez 57 y o  male MRN: [id number]  Unit/Bed#: 410-01 Encounter: [id number]        Inpatient consult to gastroenterology  Consult performed by: Shantell Larry PA-C  Consult ordered by: Oumar Ramirez DO          ASSESSMENT/ PLAN:    58 yo male with pmh STEFF, CHF, recurrent right pleural effusion, CKD, a-fib on coumadin, DM II being evaluated for hx of cirrhosis    1  Cirrhosis:  2  Recurrent right pleural effusion: etiology thought to be secondary to NAFLD vs alcoholic fatty liver  He was recently seen in the office last month and is up to date on EV screening, [address]  screening  There was no evidence of ascites on imaging at that time and no hx of hepatic encephalopathy  He presented to the ED with SOB and was found to have recurrent right sided pleural effusion, mild ascites  This could be secondary to hepatic hydrothorax although mild ascites on CT  His MELD score was 19 on admission  Fortunately his AST/ALT are normal  He does admit that he is non-compliant with his diuretics at home due to difficulty with stairs  We discussed moving his medications to avoid the stairs and being able to take his medications as prescribed  -thoracentesis planned   -continue diuretic therapy, patient non-compliant with home medications  -2g sodium diet   -f/u liver doppler  -outpatient GI follow up for continued [address]  screening       Reason for Consult / Principal Problem: cirrhosis, pleural effusion     HPI: Fern Bauer is a 58 yo male with extensive pmh including STEFF, CHF, recurrent right pleural effusion, CKD, a-fib on coumadin, DM II who presented to the ED with SOB  He admits that this has been worsening over the past month  CT revealed moderate-large right sided pleural effusion, mild ascites, subcutaneous edema and engorgement of the IVC  He has underlying cirrhosis thought to be secondary to NAFLD vs alcoholic fatty liver   His MELD score was 19 on admission and his ast and alt are normal  He is up to date with [place] [address]  screening with ruq u/s 12/20 with no mass and AFP tumor marker of 4 6 1/21  He had EGD 10/20 with no evidence of esophageal varices  He had 2 colonoscopies in 2020, last 10/20 with AVM and is s/p APC therapy  REVIEW OF SYSTEMS:     CONSTITUTIONAL: Denies any fever, chills, or rigors  Good appetite, and no recent weight loss  HEENT: No earache or tinnitus  Denies hearing loss or visual disturbances  CARDIOVASCULAR: No chest pain or palpitations  RESPIRATORY: Denies any cough, hemoptysis, shortness of breath or dyspnea on exertion  GASTROINTESTINAL: As noted in the History of Present Illness  GENITOURINARY: No problems with urination  Denies any hematuria or dysuria  NEUROLOGIC: No dizziness or vertigo, denies headaches  MUSCULOSKELETAL: Denies any muscle or joint pain  SKIN: Denies skin rashes or itching  ENDOCRINE: Denies excessive thirst  Denies intolerance to heat or cold  PSYCHOSOCIAL: Denies depression or anxiety  Denies any recent memory loss  Historical Information   Past Medical History:   Diagnosis Date    A-fib [facility])     Cardiac disease     Chronic combined systolic (congestive) and diastolic (congestive) heart failure (HCC)     Diabetes mellitus ([facility] )     Dilated cardiomyopathy ([place] [address] )     History of echocardiogram 11/24/2014    EF 0 30 (30%), Likely mod LV systolic dysfunction  Likely RV dysfunction as well   History of Lexiscan MPI 02/19/2016    EF 0 43 (43%), no prior MI or ischemia   Hx of echocardiogram 04/28/2017    Normal EF, Normal LV systolic function  Mild concentric LV hypertrophy  Mild mitral and tricuspid regurg      Hx: recurrent pneumonia     Hypertension     Long term (current) use of anticoagulants     Morbid (severe) obesity due to excess calories (HCC)     Neuropathy      Past Surgical History:   Procedure Laterality Date    HERNIA REPAIR      IR THORACENTESIS  8/25/2020    SPLENECTOMY, TOTAL      VASCULAR SURGERY Right     leg     Social History   Social History     Substance and Sexual Activity   Alcohol Use Yes    Alcohol/week: 14 0 standard drinks    Types: 5 Glasses of wine, 5 Cans of beer, 4 Shots of liquor per week    Frequency: 2-3 times a week    Drinks per session: 5 or 6    Binge frequency: Never    Comment: WEEKLY     Social History     Substance and Sexual Activity   Drug Use Never     Social History     Tobacco Use   Smoking Status Current Some Day Smoker    Packs/day: 0 25    Types: Cigarettes   Smokeless Tobacco Never Used   Tobacco Comment    2-3 CIGARETTES DAILY     No family history on file  Meds/Allergies     Medications Prior to Admission   Medication    carvedilol (COREG) 6 25 mg tablet    digoxin (LANOXIN) 0 25 mg tablet    furosemide (LASIX) 40 mg tablet    metFORMIN (GLUCOPHAGE) 1000 MG tablet    metolazone (ZAROXOLYN) 5 mg tablet    omeprazole (PriLOSEC) 40 MG capsule    warfarin (COUMADIN) 5 mg tablet    zolpidem (AMBIEN) 10 mg tablet     Current Facility-Administered Medications   Medication Dose Route Frequency    carvedilol (COREG) tablet 6 25 mg  6 25 mg Oral BID With Meals    digoxin (LANOXIN) tablet 250 mcg  250 mcg Oral Daily    furosemide (LASIX) injection 40 mg  40 mg Intravenous TID (diuretic)    insulin lispro (HumaLOG) 100 units/mL subcutaneous injection 1-6 Units  1-6 Units Subcutaneous HS    insulin lispro (HumaLOG) 100 units/mL subcutaneous injection 2-12 Units  2-12 Units Subcutaneous TID AC    nicotine (NICODERM CQ) 7 mg/24hr TD 24 hr patch 1 patch  1 patch Transdermal Daily    pantoprazole (PROTONIX) EC tablet 40 mg  40 mg Oral Early Morning    warfarin (COUMADIN) tablet 5 mg  5 mg Oral Daily (warfarin)       No Known Allergies        Objective     Blood pressure 115/75, pulse 65, temperature (!) 97 4 °F (36 3 °C), resp  rate 18, height 5' 11" (1 803 m), weight (!) 167 kg (367 lb 15 2 oz), SpO2 98 %        Intake/Output Summary (Last 24 hours) at 2/23/2021 1042  Last data filed at 2/23/2021 0815  Gross per 24 hour   Intake 720 ml   Output --   Net 720 ml         PHYSICAL EXAM:      General Appearance:   A&Ox3, cooperative, no distress, appears stated age    HEENT:   Normocephalic, atraumatic      Neck:  Supple, symmetrical, trachea midline   Lungs:   Clear to auscultation bilaterally; no rales, rhonchi or wheezing    Heart[de-identified]   S1 and S2 normal; regular rate and rhythm; no murmur, rub, or gallop  Abdomen:   Soft, non-tender, non-distended; normal bowel sounds; no masses, no organomegaly    Genitalia:   Deferred    Rectal:   Deferred    Extremities:  No cyanosis, clubbing or edema    Pulses:  2+ and symmetric all extremities    Skin:  Skin color, texture, turgor normal, no rashes or lesions          Lab Results:   Admission on 02/22/2021   Component Date Value    WBC 02/22/2021 5 81     RBC 02/22/2021 3 58*    Hemoglobin 02/22/2021 9 7*    Hematocrit 02/22/2021 32 1*    MCV 02/22/2021 90     MCH 02/22/2021 27 1     MCHC 02/22/2021 30 2*    RDW 02/22/2021 17 0*    MPV 02/22/2021 10 5     Platelets 88/74/6386 198     nRBC 02/22/2021 0     Neutrophils Relative 02/22/2021 73     Immat GRANS % 02/22/2021 0     Lymphocytes Relative 02/22/2021 12*    Monocytes Relative 02/22/2021 9     Eosinophils Relative 02/22/2021 5     Basophils Relative 02/22/2021 1     Neutrophils Absolute 02/22/2021 4 22     Immature Grans Absolute 02/22/2021 0 02     Lymphocytes Absolute 02/22/2021 0 71     Monocytes Absolute 02/22/2021 0 54     Eosinophils Absolute 02/22/2021 0 26     Basophils Absolute 02/22/2021 0 06     Protime 02/22/2021 25 4*    INR 02/22/2021 2 37*    PTT 02/22/2021 51*    SARS-CoV-2 02/22/2021 Negative     INFLUENZA A PCR 02/22/2021 Negative     INFLUENZA B PCR 02/22/2021 Negative     RSV PCR 02/22/2021 Negative     Blood Culture 02/22/2021 Received in Microbiology Lab  Culture in Progress       Blood Culture 02/22/2021 Received in Microbiology Lab  Culture in Progress       Sodium 02/22/2021 136     Potassium 02/22/2021 3 7     Chloride 02/22/2021 97*    CO2 02/22/2021 33*    ANION GAP 02/22/2021 6     BUN 02/22/2021 18     Creatinine 02/22/2021 1 30     Glucose 02/22/2021 148*    Calcium 02/22/2021 8 6     Corrected Calcium 02/22/2021 9 7     AST 02/22/2021 21     ALT 02/22/2021 17     Alkaline Phosphatase 02/22/2021 126*    Total Protein 02/22/2021 7 4     Albumin 02/22/2021 2 6*    Total Bilirubin 02/22/2021 0 60     eGFR 02/22/2021 58     Magnesium 02/22/2021 2 1     Lipase 02/22/2021 291     LACTIC ACID 02/22/2021 2 1*    Troponin I 02/22/2021 0 03     Digoxin Lvl 02/22/2021 1 6     NT-proBNP 02/22/2021 1,667*    POC Glucose 02/22/2021 154*    POC Glucose 02/22/2021 170*    LD 02/23/2021 191     Digoxin Lvl 02/23/2021 1 7     Protime 02/23/2021 26 9*    INR 02/23/2021 2 56*    WBC 02/23/2021 6 26     RBC 02/23/2021 3 27*    Hemoglobin 02/23/2021 9 0*    Hematocrit 02/23/2021 29 5*    MCV 02/23/2021 90     MCH 02/23/2021 27 5     MCHC 02/23/2021 30 5*    RDW 02/23/2021 17 0*    MPV 02/23/2021 10 5     Platelets 96/89/3054 169     nRBC 02/23/2021 0     Neutrophils Relative 02/23/2021 76*    Immat GRANS % 02/23/2021 0     Lymphocytes Relative 02/23/2021 12*    Monocytes Relative 02/23/2021 8     Eosinophils Relative 02/23/2021 3     Basophils Relative 02/23/2021 1     Neutrophils Absolute 02/23/2021 4 79     Immature Grans Absolute 02/23/2021 0 02     Lymphocytes Absolute 02/23/2021 0 72     Monocytes Absolute 02/23/2021 0 49     Eosinophils Absolute 02/23/2021 0 21     Basophils Absolute 02/23/2021 0 03     Total CK 02/23/2021 44     Sodium 02/23/2021 135*    Potassium 02/23/2021 4 1     Chloride 02/23/2021 97*    CO2 02/23/2021 31     ANION GAP 02/23/2021 7     BUN 02/23/2021 20     Creatinine 02/23/2021 1 28     Glucose 02/23/2021 129     Calcium 02/23/2021 8 5     Corrected Calcium 02/23/2021 9 7     AST 02/23/2021 30     ALT 02/23/2021 16     Alkaline Phosphatase 02/23/2021 123*    Total Protein 02/23/2021 7 6     Albumin 02/23/2021 2 5*    Total Bilirubin 02/23/2021 0 90     eGFR 02/23/2021 60     Magnesium 02/23/2021 2 2     LACTIC ACID 02/23/2021 0 9     Troponin I 02/23/2021 0 03     POC Glucose 02/23/2021 132     Ventricular Rate 02/22/2021 76     Atrial Rate 02/22/2021 61     QRSD Interval 02/22/2021 100     QT Interval 02/22/2021 350     QTC Interval 02/22/2021 393     QRS Axis 02/22/2021 26     T Wave Axis 02/22/2021 5        Imaging Studies: I have personally reviewed pertinent imaging studies  Xr Chest Portable    Result Date: 2/23/2021  Impression: 1  Persistent right basilar opacity shown on recent CT to represent a large right pleural effusion with collapse of the right middle and lower lobes  2   Stable enlargement of the cardiac silhouette  Ct Chest Abdomen Pelvis W Contrast    Result Date: 2/22/2021  Impression: 1  Moderate-sized to large right pleural effusion with multisegmental compressive collapse in the right middle and lower lobes  2   Other findings of volume overload including engorgement of the IVC, mild ascites, and subcutaneous edema  3   Antidependent distribution of body wall soft tissue swelling is somewhat atypical for anasarca  Correlate for evidence of cellulitis  4   Chronic abdominopelvic findings including hepatomegaly and probable cirrhosis, cholelithiasis, subcapsular cystic lesion in the spleen (probably related to old trauma), and colonic diverticulosis  No acute or inflammatory findings in the abdomen or pelvis  This patient was seen and examined by Dr Carson Malik  All garcia medical decisions were made by Dr Carson Malik  Thank you for allowing us to participate in the care of this patient  We will follow up closely with you

## 2021-02-23 NOTE — ASSESSMENT & PLAN NOTE
· Recurrent right-sided pleural effusion  · Continue lasix 40 mg IV TID  · Consult Intervention Radiology for a right-sided thoracentesis on 02/23/2021  · Consult Pulmonology

## 2021-02-23 NOTE — OCCUPATIONAL THERAPY NOTE
Occupational Therapy Evaluation     Patient Name: Corinne Hail  AOXPF'I Date: 2/23/2021  Problem List  Principal Problem:    Acute on chronic diastolic CHF (congestive heart failure) (HCC)  Active Problems:    STEFF (obstructive sleep apnea)    Pleural effusion on right    Cirrhosis of liver with ascites (HCC)    CKD (chronic kidney disease) stage 3, GFR 30-59 ml/min    Lactic acidosis    Permanent atrial fibrillation ([facility] )    Diverticulosis of colon    Lesion of spleen    Type 2 diabetes mellitus with hyperglycemia, without long-term current use of insulin (HCC)    Hypoxia    Tobacco use    Hyponatremia    Past Medical History  Past Medical History:   Diagnosis Date    A-fib [facility])     Cardiac disease     Chronic combined systolic (congestive) and diastolic (congestive) heart failure (HCC)     [facility] )     Dilated cardiomyopathy ([facility] )     History of echocardiogram 11/24/2014    EF 0 30 (30%), Likely mod LV systolic dysfunction  Likely RV dysfunction as well   History of Lexiscan MPI 02/19/2016    EF 0 43 (43%), no prior MI or ischemia   Hx of echocardiogram 04/28/2017    Normal EF, Normal LV systolic function  Mild concentric LV hypertrophy  Mild mitral and tricuspid regurg   Hx: recurrent pneumonia     Hypertension     Long term (current) use of anticoagulants     Morbid (severe) obesity due to excess calories (HCC)     Neuropathy      Past Surgical History  Past Surgical History:   Procedure Laterality Date    HERNIA REPAIR      IR THORACENTESIS  8/25/2020    SPLENECTOMY, TOTAL      VASCULAR SURGERY Right     leg             02/23/21 1047   OT Last Visit   OT Visit Date 02/23/21   Note Type   Note type Evaluation   Restrictions/Precautions   Weight Bearing Precautions Per Order No   Other Precautions Chair Alarm; Bed Alarm; Fall Risk;Pain   Pain Assessment   Pain Assessment Tool 0-10   Pain Score Worst Possible Pain   Pain Location/Orientation Orientation: Right  (Lung) Home Living   Type of [address] [place] [address] Two level;Bed/bath upstairs  (4 EZEKIEL c HR; 12 steps to 2nd c HR)   Bathroom Shower/Tub Tub/shower unit   Bathroom Toilet Standard   Bathroom Accessibility Accessible   Home Equipment Cane   Additional Comments Pt reports use of SPC at baseline   Prior Function   Level of [place] Independent with ADLs and functional mobility; Needs assistance with IADLs   Lives With Spouse   Receives Help From Family   ADL Assistance Independent  (Wife (A) with socks)   IADLs Needs assistance   Falls in the last 6 months 0   Comments Pt reports (I) with driving but reports worries regarding driving and safety; Pt reports wife drives as well   Psychosocial   Psychosocial (WDL) WDL   Subjective   Subjective "She only helps me with the socks"   ADL   Where Assessed Edge of bed   LB Dressing Assistance 2  Maximal Assistance   LB Dressing Deficit Don/doff R sock; Don/doff L sock   Bed Mobility   Additional Comments Pt seated OOB in chair at start and end of session   Transfers   Sit to Stand 5  Supervision   Stand to Sit 5  Supervision   Additional Comments Pt completed with [facility]   Functional Mobility   Functional Mobility 5  Supervision   Additional Comments Pt performed ~50 ft FM within vargas with SPC; no LOB or instability; SPO2 WFL throughout session, on RA   Additional items SPC   Balance   Static Sitting Fair +   Dynamic Sitting Fair   Static Standing Fair   Dynamic Standing Fair -   Ambulatory Fair -   Activity Tolerance   Activity Tolerance Patient tolerated treatment well   RUE Assessment   RUE Assessment WFL   LUE Assessment   LUE Assessment WFL   Hand Function   Gross Motor Coordination Functional   Fine Motor Coordination Functional   Sensation   Light Touch No apparent deficits   Cognition   Overall Cognitive Status WFL   Arousal/Participation Alert; Cooperative   Attention Within functional limits   Orientation Level Oriented X4   Memory Within functional limits   Following Commands Follows all commands and directions without difficulty   Comments Pt friendly and agreeable to participate   Assessment   Limitation Decreased ADL status; Decreased UE strength;Decreased endurance;Decreased self-care trans;Decreased high-level ADLs   Assessment Pt is a 57 y o  male seen for OT evaluation s/p admit to [facility] on 2/22/2021 w/ Acute on chronic diastolic CHF (congestive heart failure) ([facility] )  Comorbidities affecting pt's functional performance at time of assessment include: DM, HTN and CKD, STEFF, tobacco abuse, obesity, CHF, hypokalemia, anemia   Personal factors affecting pt at time of IE include:steps to enter environment, difficulty performing ADLS, difficulty performing IADLS , limited insight into deficits, compliance, decreased initiation and engagement  and health management   Prior to admission, pt was (I) for ADLs, (A) for IADLs, and FM with SPC  Upon evaluation: Pt requires (S) for transfers, FM with SPC ~50 ft and max (A) for LB dressing  2* the following deficits impacting occupational performance: weakness, decreased strength, decreased balance, decreased tolerance, impaired initiation, decreased safety awareness, increased pain, impaired interpersonal skills and decreased coping skills  Pt to benefit from continued skilled OT tx while in the hospital to address deficits as defined above and maximize level of functional independence w ADL's and functional mobility  Occupational Performance areas to address include: grooming, bathing/shower, toilet hygiene, dressing, medication management, socialization, health maintenance, functional mobility, community mobility and clothing management  From OT standpoint, recommendation at time of d/c would be home with [address] [place] [address]       Goals   Patient Goals to get home    Short Term Goal  Pt will perform UE strengthening exercises    Long Term Goal #1 Pt will demonstrate FM with AD at mod (I) level   Long Term Goal #2 Pt will demonstrate UB/LB dressing and bathing tasks at min (A) level   Long Term Goal Pt will demonstrate toilet transfer and hygiene at mod (I) level   Plan   Treatment Interventions ADL retraining;Functional transfer training;UE strengthening/ROM; Endurance training;Patient/family training;Equipment evaluation/education; Activityengagement; Energy conservation   Goal Expiration Date 03/09/21   OT Frequency 3-5x/wk   Recommendation   OT Discharge Recommendation Home with skilled therapy  ([address] [place] [address])   OT - OK to Discharge Yes  (When medically cleared)   Additional Comments  Pt's raw score on the AM-PAC Daily Activity short form is 18, standardized score is 38 66  Pts at this level are likely to benefit from DC to home with [address] [place] [address], however, therapist recommendation is home with [address] [place] [address]  Pt's raw score on the AM-PAC Applied Cognition inpatient short form is 24, standardized score is 62 21  Pts at this level are likely to benefit from DC to home with no needs, however, therapist recommendation is home with [address] [place] [address] Daily Activity Inpatient   Lower Body Dressing 1   Bathing 2   Toileting 3   Upper Body Dressing 4   Grooming 4   Eating 4   Daily Activity Raw Score 18   Daily Activity Standardized Score (Calc for Raw Score >=11) 38 66   AM-[facility] Applied Cognition Inpatient   Following a Speech/Presentation 4   Understanding Ordinary Conversation 4   Taking Medications 4   Remembering Where Things Are Placed or Put Away 4   Remembering List of 4-5 Errands 4   Taking Care of Complicated Tasks 4   Applied Cognition Raw Score 24   Applied Cognition Standardized Score 62 21     Pt will benefit from continued OT services in order to maximize (I) c ADL performance, FM c RW, and improve overall endurance/strength required to complete functional tasks in preparation for d/c  Pt left seated in chair at end of session; all needs within reach; all lines intact

## 2021-02-23 NOTE — ASSESSMENT & PLAN NOTE
Lab Results   Component Value Date    HGBA1C 6 1 (H) 01/05/2021       Recent Labs     02/22/21  1522 02/22/21 2037 02/23/21  0726   POCGLU 154* 170* 132       Blood Sugar Average: Last 72 hrs:  (P) 152   · Hold metformin in the setting of a lactic acidosis  · Consider the initiation of an ACE-inhibitor for renal protection in the setting of type 2 diabetes mellitus  · Insulin sliding scale with blood glucose monitoring ACHS

## 2021-02-23 NOTE — ASSESSMENT & PLAN NOTE
Wt Readings from Last 3 Encounters:   02/23/21 (!) 167 kg (367 lb 15 2 oz)   01/13/21 (!) 159 kg (351 lb 6 4 oz)   01/12/21 (!) 161 kg (356 lb)     · Admit to med/surg level of care with telemetry monitoring  · Approximately 30 lbs   weight gain over the last few months  · Follow troponin levels  · Continue lasix 40 mg IV TID  · Continue coreg 6 25 mg PO BID  · Consult Cardiology  · Daily weights  · Strict intake/output measurements  · PT/OT

## 2021-02-23 NOTE — PLAN OF CARE
Problem: Potential for Falls  Goal: Patient will remain free of falls  Description: INTERVENTIONS:  - Assess patient frequently for physical needs  -  Identify cognitive and physical deficits and behaviors that affect risk of falls    -  [place] fall precautions as indicated by assessment   - Educate patient/family on patient safety including physical limitations  - Instruct patient to call for assistance with activity based on assessment  - Modify environment to reduce risk of injury  - Consider OT/PT consult to assist with strengthening/mobility  Outcome: Progressing     Problem: CARDIOVASCULAR - ADULT  Goal: Maintains optimal cardiac output and hemodynamic stability  Description: INTERVENTIONS:  - Monitor I/O, vital signs and rhythm  - Monitor for S/S and trends of decreased cardiac output  - Administer and titrate ordered vasoactive medications to optimize hemodynamic stability  - Assess quality of pulses, skin color and temperature  - Assess for signs of decreased coronary artery perfusion  - Instruct patient to report change in severity of symptoms  Outcome: Progressing  Goal: Absence of cardiac dysrhythmias or at baseline rhythm  Description: INTERVENTIONS:  - Continuous cardiac monitoring, vital signs, obtain 12 lead EKG if ordered  - Administer antiarrhythmic and heart rate control medications as ordered  - Monitor electrolytes and administer replacement therapy as ordered  Outcome: Progressing     Problem: RESPIRATORY - ADULT  Goal: Achieves optimal ventilation and oxygenation  Description: INTERVENTIONS:  - Assess for changes in respiratory status  - Assess for changes in mentation and behavior  - Position to facilitate oxygenation and minimize respiratory effort  - Oxygen administered by appropriate delivery if ordered  - Initiate smoking cessation education as indicated  - Encourage broncho-pulmonary hygiene including cough, deep breathe, Incentive Spirometry  - Assess the need for suctioning and aspirate as needed  - Assess and instruct to report SOB or any respiratory difficulty  - Respiratory Therapy support as indicated  Outcome: Progressing     Problem: METABOLIC, FLUID AND ELECTROLYTES - ADULT  Goal: Electrolytes maintained within normal limits  Description: INTERVENTIONS:  - Monitor labs and assess patient for signs and symptoms of electrolyte imbalances  - Administer electrolyte replacement as ordered  - Monitor response to electrolyte replacements, including repeat lab results as appropriate  - Instruct patient on fluid and nutrition as appropriate  Outcome: Progressing  Goal: Fluid balance maintained  Description: INTERVENTIONS:  - Monitor labs   - Monitor I/O and WT  - Instruct patient on fluid and nutrition as appropriate  - Assess for signs & symptoms of volume excess or deficit  Outcome: Progressing  Goal: Glucose maintained within target range  Description: INTERVENTIONS:  - Monitor Blood Glucose as ordered  - Assess for signs and symptoms of hyperglycemia and hypoglycemia  - Administer ordered medications to maintain glucose within target range  - Assess nutritional intake and initiate nutrition service referral as needed  Outcome: Progressing     Problem: SAFETY ADULT  Goal: Patient will remain free of falls  Description: INTERVENTIONS:  - Assess patient frequently for physical needs  -  Identify cognitive and physical deficits and behaviors that affect risk of falls    -  [place] fall precautions as indicated by assessment   - Educate patient/family on patient safety including physical limitations  - Instruct patient to call for assistance with activity based on assessment  - Modify environment to reduce risk of injury  - Consider OT/PT consult to assist with strengthening/mobility  Outcome: Progressing  Goal: Maintain or return to baseline ADL function  Description: INTERVENTIONS:  -  Assess patient's ability to carry out ADLs; assess patient's baseline for ADL function and identify physical deficits which impact ability to perform ADLs (bathing, care of mouth/teeth, toileting, grooming, dressing, etc )  - Assess/evaluate cause of self-care deficits   - Assess range of motion  - Assess patient's mobility; develop plan if impaired  - Assess patient's need for assistive devices and provide as appropriate  - Encourage maximum independence but intervene and supervise when necessary  - Involve family in performance of ADLs  - Assess for home care needs following discharge   - Consider OT consult to assist with ADL evaluation and planning for discharge  - Provide patient education as appropriate  Outcome: Progressing  Goal: Maintain or return mobility status to optimal level  Description: INTERVENTIONS:  - Assess patient's baseline mobility status (ambulation, transfers, stairs, etc )    - Identify cognitive and physical deficits and behaviors that affect mobility  - Identify mobility aids required to assist with transfers and/or ambulation (gait belt, sit-to-stand, lift, walker, cane, etc )  - [place] fall precautions as indicated by assessment  - Record patient progress and toleration of activity level on Mobility SBAR; progress patient to next Phase/Stage  - Instruct patient to call for assistance with activity based on assessment  - Consider rehabilitation consult to assist with strengthening/weightbearing, etc   Outcome: Progressing     Problem: DISCHARGE PLANNING  Goal: Discharge to home or other facility with appropriate resources  Description: INTERVENTIONS:  - Identify barriers to discharge w/patient and caregiver  - Arrange for needed discharge resources and transportation as appropriate  - Identify discharge learning needs (meds, wound care, etc )  - Refer to Case Management Department for coordinating discharge planning if the patient needs post-hospital services based on physician/advanced practitioner order or complex needs related to functional status, cognitive ability, or social support system  Outcome: Progressing     Problem: Knowledge Deficit  Goal: Patient/family/caregiver demonstrates understanding of disease process, treatment plan, medications, and discharge instructions  Description: Complete learning assessment and assess knowledge base    Interventions:  - Provide teaching at level of understanding  - Provide teaching via preferred learning methods  Outcome: Progressing

## 2021-02-24 PROBLEM — E55.9 VITAMIN D DEFICIENCY: Status: ACTIVE | Noted: 2021-01-01

## 2021-02-24 PROBLEM — J98.11 ATELECTASIS: Status: ACTIVE | Noted: 2021-01-01

## 2021-02-24 PROBLEM — I27.20 PULMONARY HYPERTENSION (HCC): Status: ACTIVE | Noted: 2021-01-01

## 2021-02-24 PROBLEM — K59.00 CONSTIPATION: Status: ACTIVE | Noted: 2021-01-01

## 2021-02-24 NOTE — PROGRESS NOTES
Cardiology Progress Note - Belvie Night 57 y o  male MRN: [id number]    Unit/Bed#: 410-01 Encounter: [id number]      Assessment/Plan:  1  Acute on chronic diastolic congestive heart failure              - patient remains significantly volume overloaded on exam   - responding well to lasix 40 mg IV TID - weight down 4 lbs since yesterday   - weight today 363 lbs, dry weight is around 318 lbs per patient  - no accurate I/O were documented but patient reports having good urinary output   - creatinine slightly increased today - however given continued volume overload will continue current dose of lasix 40 mg IV TID   - replace K+   - continue daily weights, low sodium diet, fluid restriction, I/O   - monitor daily BMP    2  Chronic atrial fibrillation              - remains in atrial fibrillation with controlled rates on telemetry               - continue carvedilol and digoxin              - on anticoagulation with coumadin for goal INR 2-3   - INR today 2 4     3  Hypertension               - improving with diuresis, continue to monitor      4  Obstructive sleep apnea              - reports compliance with CPAP    Subjective:   Patient seen and examined  Shortness of breath is improving  Denies chest pain  Still with significant edema  S/p thoracentesis yesterday  Review of Systems   Constitution: Negative for chills and fever  Cardiovascular: Positive for dyspnea on exertion, leg swelling and orthopnea  Negative for chest pain, near-syncope, palpitations, paroxysmal nocturnal dyspnea and syncope  Respiratory: Positive for shortness of breath  Negative for cough  Gastrointestinal: Positive for bloating  Negative for diarrhea, nausea and vomiting  Genitourinary: Negative  Neurological: Negative for dizziness and light-headedness  All other systems reviewed and are negative  Objective:   Vitals: Blood pressure 123/63, pulse 64, temperature 98 °F (36 7 °C), temperature source Oral, resp   rate 16, height 5' 11" (1 803 m), weight (!) 165 kg (363 lb 8 6 oz), SpO2 96 %  , Body mass index is 50 7 kg/m² ,   Orthostatic Blood Pressures      Most Recent Value   Blood Pressure  123/63 filed at 02/24/2021 2066   Patient Position - Orthostatic VS  Sitting filed at 02/24/2021 5131         Systolic (53KZY), BCR:715 , Min:123 , BDO:425     Diastolic (16REV), WEL:29, Min:63, Max:84      Intake/Output Summary (Last 24 hours) at 2/24/2021 0916  Last data filed at 2/24/2021 0647  Gross per 24 hour   Intake 720 ml   Output 500 ml   Net 220 ml     Weight (last 2 days)     Date/Time   Weight    02/24/21 0600   (!) 165 (363 54)    02/23/21 0600   (!) 167 (367 95)    02/22/21 1353   (!) 168 (369 71)    02/22/21 12:38:54   (!) 168 (369 71)    02/22/21 0931   (!) 159 (350)                Telemetry Review: atrial fibrillation with controlled rates, occasional PVC's  EKG personally reviewed by Elena Day PA-C  Physical Exam  Vitals signs reviewed  Constitutional:       General: He is not in acute distress  Appearance: He is well-developed  He is obese  He is not diaphoretic  Comments: Morbidly obese male in no acute distress   HENT:      Head: Normocephalic and atraumatic  Eyes:      Pupils: Pupils are equal, round, and reactive to light  Neck:      Musculoskeletal: Normal range of motion  Vascular: No carotid bruit  Cardiovascular:      Rate and Rhythm: Normal rate  Rhythm irregularly irregular  Pulses:           Radial pulses are 2+ on the right side and 2+ on the left side  Heart sounds: S1 normal and S2 normal  No murmur  Pulmonary:      Effort: Pulmonary effort is normal  No respiratory distress  Breath sounds: Normal breath sounds  No wheezing or rales  Abdominal:      General: There is distension  Palpations: Abdomen is soft  Tenderness: There is no abdominal tenderness  Musculoskeletal: Normal range of motion        Right lower leg: Edema (+2/+3 bilateral lower extremity edema) present  Left lower leg: Edema present  Skin:     General: Skin is warm and dry  Findings: No erythema  Neurological:      General: No focal deficit present  Mental Status: He is alert and oriented to person, place, and time  Psychiatric:         Mood and Affect: Mood normal          Behavior: Behavior normal            Laboratory Results:  Results from last 7 days   Lab Units 02/24/21  0637 02/23/21  0527 02/23/21  0525 02/22/21  0945   CK TOTAL U/L  --  44  --   --    TROPONIN I ng/mL 0 03  --  0 03 0 03       CBC with diff:   Results from last 7 days   Lab Units 02/24/21  0637 02/23/21  0814 02/22/21  0945   WBC Thousand/uL 6 44 6 26 5 81   HEMOGLOBIN g/dL 9 5* 9 0* 9 7*   HEMATOCRIT % 31 9* 29 5* 32 1*   MCV fL 91 90 90   PLATELETS Thousands/uL 175 169 198   MCH pg 27 1 27 5 27 1   MCHC g/dL 29 8* 30 5* 30 2*   RDW % 17 0* 17 0* 17 0*   MPV fL 10 7 10 5 10 5   NRBC AUTO /100 WBCs 0 0 0         CMP:  Results from last 7 days   Lab Units 02/24/21  0637 02/23/21  0527 02/22/21  0945   POTASSIUM mmol/L 3 6 4 1 3 7   CHLORIDE mmol/L 98* 97* 97*   CO2 mmol/L 35* 31 33*   BUN mg/dL 21 20 18   CREATININE mg/dL 1 50* 1 28 1 30   CALCIUM mg/dL 8 6 8 5 8 6   AST U/L 20 30 21   ALT U/L 20 16 17   ALK PHOS U/L 121* 123* 126*   EGFR ml/min/1 73sq m 49 60 58         BMP:  Results from last 7 days   Lab Units 02/24/21  0637 02/23/21  0527 02/22/21  0945   POTASSIUM mmol/L 3 6 4 1 3 7   CHLORIDE mmol/L 98* 97* 97*   CO2 mmol/L 35* 31 33*   BUN mg/dL 21 20 18   CREATININE mg/dL 1 50* 1 28 1 30   CALCIUM mg/dL 8 6 8 5 8 6       BNP: No results for input(s): BNP in the last 72 hours      Magnesium:   Results from last 7 days   Lab Units 02/24/21  0637 02/23/21  0527 02/22/21  0945   MAGNESIUM mg/dL 2 1 2 2 2 1       Coags:   Results from last 7 days   Lab Units 02/24/21  0637 02/23/21  0814 02/22/21  0945   PTT seconds  --   --  51*   INR  2 40* 2 56* 2 37*       TSH:        Hemoglobin A1C Lipid Profile:       Cardiac testing:   Results for orders placed during the hospital encounter of 21   Echo complete with contrast if indicated    Narrative [address]  [address], [place] [address]  [phone]    Transthoracic Echocardiogram  2D, M-mode, Doppler, and Color Doppler    Study date:  2021    Patient: Stefan Wynn  MR number: [id number]  Account number: [de-identified]  : 1960  Age: 57 years  Gender: Male  Status: Inpatient  Location: Bedside  Height: 71 in  Weight: 350 lb  BP: 130/ 80 mmHg    Indications: shortness of breath    Diagnoses: 428 0 - CONGESTIVE HEART FAILURE    Sonographer:  Renne Cockayne, CCT  Primary Physician:  Jensen Mayer DO  Referring Physician:  Micha Roth DO  Group:  [facility]  Interpreting Physician:  Trent Guaman DO    SUMMARY    LEFT VENTRICLE:  Systolic function was normal  Ejection fraction was estimated to be 60 %  There were no regional wall motion abnormalities  Wall thickness was mildly increased  RIGHT VENTRICLE:  The ventricle was moderately dilated  Systolic function was mildly reduced  LEFT ATRIUM:  The atrium was mildly dilated  RIGHT ATRIUM:  The atrium was moderately dilated  MITRAL VALVE:  There was mild regurgitation  TRICUSPID VALVE:  There was moderate regurgitation  Estimated peak PA pressure was 52 mmHg  HISTORY: PRIOR HISTORY: CHF, morbid obesity    PROCEDURE: The procedure was performed at the bedside  This was a routine study  The transthoracic approach was used  The study included complete 2D imaging, M-mode, complete spectral Doppler, and color Doppler  The heart rate was 80 bpm,  at the start of the study  Echocardiographic views were limited due to poor patient compliance and poor acoustic window availability  This was a technically difficult study      LEFT VENTRICLE: Size was normal  Systolic function was normal  Ejection fraction was estimated to be 60 %  There were no regional wall motion abnormalities  Wall thickness was mildly increased  DOPPLER: The study was not technically  sufficient to allow evaluation of LV diastolic function  RIGHT VENTRICLE: The ventricle was moderately dilated  Systolic function was mildly reduced  LEFT ATRIUM: The atrium was mildly dilated  RIGHT ATRIUM: The atrium was moderately dilated  MITRAL VALVE: Valve structure was normal  There was normal leaflet separation  DOPPLER: The transmitral velocity was within the normal range  There was no evidence for stenosis  There was mild regurgitation  AORTIC VALVE: The valve was trileaflet  Leaflets exhibited normal thickness and normal cuspal separation  DOPPLER: Transaortic velocity was within the normal range  There was no evidence for stenosis  There was no regurgitation  TRICUSPID VALVE: The valve structure was normal  There was normal leaflet separation  DOPPLER: The transtricuspid velocity was within the normal range  There was no evidence for stenosis  There was moderate regurgitation  Estimated peak PA  pressure was 52 mmHg  PULMONIC VALVE: Leaflets exhibited normal thickness, no calcification, and normal cuspal separation  DOPPLER: The transpulmonic velocity was within the normal range  There was no regurgitation  PERICARDIUM: There was no pericardial effusion  The pericardium was normal in appearance  AORTA: The root exhibited normal size  SYSTEMIC VEINS: IVC: The inferior vena cava was not well visualized      SYSTEM MEASUREMENT TABLES    2D  %FS: 31 45 %  Ao Diam: 3 22 cm  EDV(Teich): 148 86 ml  EF(Teich): 58 74 %  ESV(Teich): 61 42 ml  IVSd: 1 51 cm  LA Area: 20 63 cm2  LA Diam: 5 65 cm  LVIDd: 5 52 cm  LVIDs: 3 79 cm  LVPWd: 1 08 cm  RA Area: 33 86 cm2  RWT: 0 39  SV(Teich): 87 44 ml    CW  RAP: 0 mmHg  TR Vmax: 3 47 m/s  TR maxP 33 mmHg    PW  E' Sept: 0 09 m/s  E/E' Sept: 13 35  MV A Ti: 0 48 m/s  MV Dec Sharkey: 4 5 m/s2  MV DecT: 253 15 ms  MV E Ti: 1 14 m/s  MV E/A Ratio: 2 36  RVSP: 48 33 mmHg    [facility]    Prepared and electronically signed by    Leon Moscoso DO  Signed 23-Feb-2021 08:06:00       No results found for this or any previous visit  No results found for this or any previous visit  No results found for this or any previous visit      Meds/Allergies   all current active meds have been reviewed  Medications Prior to Admission   Medication    carvedilol (COREG) 6 25 mg tablet    digoxin (LANOXIN) 0 25 mg tablet    furosemide (LASIX) 40 mg tablet    metFORMIN (GLUCOPHAGE) 1000 MG tablet    metolazone (ZAROXOLYN) 5 mg tablet    omeprazole (PriLOSEC) 40 MG capsule    warfarin (COUMADIN) 5 mg tablet    zolpidem (AMBIEN) 10 mg tablet          Assessment:  Principal Problem:    Acute on chronic diastolic CHF (congestive heart failure) (HCC)  Active Problems:    STEFF (obstructive sleep apnea)    Pleural effusion on right    Cirrhosis of liver with ascites (HCC)    CKD (chronic kidney disease) stage 3, GFR 30-59 ml/min    Lactic acidosis    Permanent atrial fibrillation (HCC)    Diverticulosis of colon    Lesion of spleen    Type 2 diabetes mellitus with hyperglycemia, without long-term current use of insulin (HCC)    Hypoxia    Tobacco use    Hyponatremia

## 2021-02-24 NOTE — PLAN OF CARE
Problem: PHYSICAL THERAPY ADULT  Goal: Performs mobility at highest level of function for planned discharge setting  See evaluation for individualized goals  Description: Treatment/Interventions: LE strengthening/ROM, Functional transfer training, Elevations, Therapeutic exercise, Endurance training, Gait training, Bed mobility          See flowsheet documentation for full assessment, interventions and recommendations  Outcome: Progressing  Note: Prognosis: Good  Problem List: Decreased strength, Decreased endurance, Impaired balance, Decreased mobility  Assessment: Pt  seen for PT treatment session this date with interventions consisting of  Therapeutic exercises, transfers and  gait training w/ emphasis on improving pt's ability to ambulate  Pt  Currently performing  tx and ambulation at ( SUP) x 1 level of function  Utilizing [facility] with fair balance and stability  The patient's AM-PAC Basic Mobility Inpatient Short Form Raw Score is 23, Standardized Score is 50  88  A standardized score greater than 42 9 suggests the patient may benefit from discharge to home  Please also refer to physical therapy recommendation for safe DC planning  In comparison to previous session, Pt  With improvements in activity tolerance since thoracentesis yesterday  Minimal c/o SOB with ambulation  SpO2 WFL's MONO Urena Pt is in need of continued activity in PT to improve strength balance endurance mobility transfers and ambulation with return to maximize LOF  From PT/mobility standpoint, recommendation at time of d/c would be home PT  in order to promote return to PLOF and independence  PT Discharge Recommendation: Home with skilled therapy          See flowsheet documentation for full assessment

## 2021-02-24 NOTE — ASSESSMENT & PLAN NOTE
· Likely hypervolemic hyponatremia  · Resolved with IV lasix treatment  · Continue lasix 40 mg IV TID  · Follow the sodium level    Results from last 7 days   Lab Units 02/24/21  0637 02/23/21  0527 02/22/21  0945   SODIUM mmol/L 136 135* 136   POTASSIUM mmol/L 3 6 4 1 3 7   CHLORIDE mmol/L 98* 97* 97*   CO2 mmol/L 35* 31 33*   BUN mg/dL 21 20 18   CREATININE mg/dL 1 50* 1 28 1 30   CALCIUM mg/dL 8 6 8 5 8 6

## 2021-02-24 NOTE — RESPIRATORY THERAPY NOTE
02/23/21 8123   Non-Invasive Information   O2 Interface Device Full face mask   Non-Invasive Ventilation Mode BiPAP   SpO2 94 %   $ Pulse Oximetry Spot Check Charge Completed   Resp Comments patient brought his own med mask   Non-Invasive Settings   IPAP (cm) 17 cm   EPAP (cm) 11 cm   Rate (Set) 4   FiO2 (%) 21   Pressure Support (cm H2O) 6   Rise Time 3   Trigger Sensitivity Flow (lpm) 3   Inspiratory Time (Set) 0 9   Non-Invasive Readings   Total Rate 15   Vt (mL) ([facility]) 670   MV ([facility]) 10 5   Peak Pressure (Obs) 17   Skin Intervention Mask rotated;Skin intact   Non-Invasive Alarms   MV Low (L/min) 4   High Resp Rate (BPM) 45 BPM   Apnea Interval (sec) 30       Patient is now on the bipap therapy, at his home settings  He has brought his own medium full face mask in   Oxygen saturation on the bipap therapy room air is 94%    Patient is not in respiratory distress, does not c/o sob    Machine was wiped down and disinfected

## 2021-02-24 NOTE — PROGRESS NOTES
Progress Note - Pulmonary   Sydney Pineda 57 y o  male MRN: [id number]  Unit/Bed#: 410-01 Encounter: [id number]      Assessment:  · Pleural effusion status post thoracentesis  2 L were drained  Fluid analysis is consistent with a   · Acute hypoxemic respiratory failure  This has resolved  The patient is on room air  · Obstructive sleep apnea  Patient is compliant with the nocturnal CPAP  Plan:  · Nocturnal CPAP  · Adequate diuresis  · Follow-up as outpatient  · Will sign off  Please call with questions  Subjective: The patient is feeling much better  He denies shortness of breath  He is on room air  Denies pain  Vitals: Blood pressure 122/62, pulse 63, temperature 98 °F (36 7 °C), temperature source Oral, resp  rate 16, height 5' 11" (1 803 m), weight (!) 165 kg (363 lb 8 6 oz), SpO2 94 %  , Body mass index is 50 7 kg/m²  Intake/Output Summary (Last 24 hours) at 2/24/2021 1358  Last data filed at 2/24/2021 1207  Gross per 24 hour   Intake 960 ml   Output 1000 ml   Net -40 ml       Physical Exam  Gen: Awake, alert, oriented x 3, no acute distress  HEENT: Mucous membranes moist, no oral lesions, no thrush  NECK: No accessory muscle use, JVP not elevated  Cardiac: Regular, single S1, single S2, no murmurs, no rubs, no gallops  Lungs:  Decreased breath sounds on the right base  Abdomen: normoactive bowel sounds, soft nontender, nondistended, no rebound or rigidity, no guarding  Extremities: no cyanosis, no clubbing    4+ edema    Labs:   CBC:   Lab Results   Component Value Date    WBC 6 44 02/24/2021    HGB 9 5 (L) 02/24/2021    HCT 31 9 (L) 02/24/2021    MCV 91 02/24/2021     02/24/2021    MCH 27 1 02/24/2021    MCHC 29 8 (L) 02/24/2021    RDW 17 0 (H) 02/24/2021    MPV 10 7 02/24/2021    NRBC 0 02/24/2021   , CMP:   Lab Results   Component Value Date    SODIUM 136 02/24/2021    K 3 6 02/24/2021    CL 98 (L) 02/24/2021    CO2 35 (H) 02/24/2021    BUN 21 02/24/2021    CREATININE 1 50 (H) 02/24/2021    CALCIUM 8 6 02/24/2021    AST 20 02/24/2021    ALT 20 02/24/2021    ALKPHOS 121 (H) 02/24/2021    EGFR 49 02/24/2021     Pleural fluid analysis is consistent with transudate  Chest x-ray is reviewed on the [facility] system and compared to the 1 done on admission  Right-sided pleural effusion has markedly decreased        Lynda Liu MD

## 2021-02-24 NOTE — ASSESSMENT & PLAN NOTE
· Initiate cholecalciferol 2000 I  U  PO Qdaily  · Recheck a vitamin D 25-OH level in 1-2 months with his PCP    Results for Sourav Newell (MRN [id number]) as of 2/24/2021 12:28   Ref   Range 2/23/2021 05:26   Vit D, 25-Hydroxy Latest Ref Range: 30 0 - 100 0 ng/mL 13 1 (L)

## 2021-02-24 NOTE — ASSESSMENT & PLAN NOTE
· The patient was seen in consultation by Gastroenterology  · Avoid all hepatotoxic agents  · Outpatient hepatocellular carcinoma surveillance and follow-up with Gastroenterology    US liver doppler only  Status: Final result   PACS Images     Show images for US liver doppler only   Study Result    ABDOMEN ULTRASOUND, COMPLETE WITH DOPPLER     INDICATION:    Cirrhosis with ascites        COMPARISON:  Compared with 10/2/2020     TECHNIQUE:   Real-time ultrasound of the abdomen was performed with a curvilinear transducer with Doppler evaluation of liver      FINDINGS:     The main portal vein and primary branch segments are patent and hepatopetal with normal spectral waveform  Peak systolic velocity at 32 cm/s  The right at 42 cm/s and the left at 59 cm/s      Hepatic veins are patent  Spectral waveforms within normal limits  Right hepatic vein at 57 cm/s, mid hepatic vein at 56 cm/s and left hepatic vein at 77 cm/s        Main hepatic artery appears normal size, patent with normal spectral waveform      Splenic vein is patent  56 cm/s      IMPRESSION:     Patent portal vein and hepatic veins

## 2021-02-24 NOTE — ASSESSMENT & PLAN NOTE
Lab Results   Component Value Date    HGBA1C 6 1 (H) 01/05/2021       Recent Labs     02/23/21  1545 02/23/21  2105 02/24/21  0649 02/24/21  1136   POCGLU 133 159* 125 117       Blood Sugar Average: Last 72 hrs:  (P) 139 125   · Hold metformin in the setting of a lactic acidosis  · Consider the initiation of an ACE-inhibitor for renal protection in the setting of type 2 diabetes mellitus if his renal function remains stable  · Insulin sliding scale with blood glucose monitoring ACHS

## 2021-02-24 NOTE — CASE MANAGEMENT
As per physician during inner disciplinary discharge planning meeting patient is not ready for discharge  Pt is continuing to get IV Lasix TID  Pt is interested in cardiac rehab on discharge  I will continue to follow for any case Management needs

## 2021-02-24 NOTE — PLAN OF CARE
Problem: Potential for Falls  Goal: Patient will remain free of falls  Description: INTERVENTIONS:  - Assess patient frequently for physical needs  -  Identify cognitive and physical deficits and behaviors that affect risk of falls    -  [place] fall precautions as indicated by assessment   - Educate patient/family on patient safety including physical limitations  - Instruct patient to call for assistance with activity based on assessment  - Modify environment to reduce risk of injury  - Consider OT/PT consult to assist with strengthening/mobility  Outcome: Progressing     Problem: CARDIOVASCULAR - ADULT  Goal: Maintains optimal cardiac output and hemodynamic stability  Description: INTERVENTIONS:  - Monitor I/O, vital signs and rhythm  - Monitor for S/S and trends of decreased cardiac output  - Administer and titrate ordered vasoactive medications to optimize hemodynamic stability  - Assess quality of pulses, skin color and temperature  - Assess for signs of decreased coronary artery perfusion  - Instruct patient to report change in severity of symptoms  Outcome: Progressing  Goal: Absence of cardiac dysrhythmias or at baseline rhythm  Description: INTERVENTIONS:  - Continuous cardiac monitoring, vital signs, obtain 12 lead EKG if ordered  - Administer antiarrhythmic and heart rate control medications as ordered  - Monitor electrolytes and administer replacement therapy as ordered  Outcome: Progressing     Problem: RESPIRATORY - ADULT  Goal: Achieves optimal ventilation and oxygenation  Description: INTERVENTIONS:  - Assess for changes in respiratory status  - Assess for changes in mentation and behavior  - Position to facilitate oxygenation and minimize respiratory effort  - Oxygen administered by appropriate delivery if ordered  - Initiate smoking cessation education as indicated  - Encourage broncho-pulmonary hygiene including cough, deep breathe, Incentive Spirometry  - Assess the need for suctioning and aspirate as needed  - Assess and instruct to report SOB or any respiratory difficulty  - Respiratory Therapy support as indicated  Outcome: Progressing     Problem: METABOLIC, FLUID AND ELECTROLYTES - ADULT  Goal: Electrolytes maintained within normal limits  Description: INTERVENTIONS:  - Monitor labs and assess patient for signs and symptoms of electrolyte imbalances  - Administer electrolyte replacement as ordered  - Monitor response to electrolyte replacements, including repeat lab results as appropriate  - Instruct patient on fluid and nutrition as appropriate  Outcome: Progressing  Goal: Fluid balance maintained  Description: INTERVENTIONS:  - Monitor labs   - Monitor I/O and WT  - Instruct patient on fluid and nutrition as appropriate  - Assess for signs & symptoms of volume excess or deficit  Outcome: Progressing  Goal: Glucose maintained within target range  Description: INTERVENTIONS:  - Monitor Blood Glucose as ordered  - Assess for signs and symptoms of hyperglycemia and hypoglycemia  - Administer ordered medications to maintain glucose within target range  - Assess nutritional intake and initiate nutrition service referral as needed  Outcome: Progressing     Problem: SAFETY ADULT  Goal: Patient will remain free of falls  Description: INTERVENTIONS:  - Assess patient frequently for physical needs  -  Identify cognitive and physical deficits and behaviors that affect risk of falls    -  [place] fall precautions as indicated by assessment   - Educate patient/family on patient safety including physical limitations  - Instruct patient to call for assistance with activity based on assessment  - Modify environment to reduce risk of injury  - Consider OT/PT consult to assist with strengthening/mobility  Outcome: Progressing  Goal: Maintain or return to baseline ADL function  Description: INTERVENTIONS:  -  Assess patient's ability to carry out ADLs; assess patient's baseline for ADL function and identify physical deficits which impact ability to perform ADLs (bathing, care of mouth/teeth, toileting, grooming, dressing, etc )  - Assess/evaluate cause of self-care deficits   - Assess range of motion  - Assess patient's mobility; develop plan if impaired  - Assess patient's need for assistive devices and provide as appropriate  - Encourage maximum independence but intervene and supervise when necessary  - Involve family in performance of ADLs  - Assess for home care needs following discharge   - Consider OT consult to assist with ADL evaluation and planning for discharge  - Provide patient education as appropriate  Outcome: Progressing  Goal: Maintain or return mobility status to optimal level  Description: INTERVENTIONS:  - Assess patient's baseline mobility status (ambulation, transfers, stairs, etc )    - Identify cognitive and physical deficits and behaviors that affect mobility  - Identify mobility aids required to assist with transfers and/or ambulation (gait belt, sit-to-stand, lift, walker, cane, etc )  - [place] fall precautions as indicated by assessment  - Record patient progress and toleration of activity level on Mobility SBAR; progress patient to next Phase/Stage  - Instruct patient to call for assistance with activity based on assessment  - Consider rehabilitation consult to assist with strengthening/weightbearing, etc   Outcome: Progressing     Problem: DISCHARGE PLANNING  Goal: Discharge to home or other facility with appropriate resources  Description: INTERVENTIONS:  - Identify barriers to discharge w/patient and caregiver  - Arrange for needed discharge resources and transportation as appropriate  - Identify discharge learning needs (meds, wound care, etc )  - Refer to Case Management Department for coordinating discharge planning if the patient needs post-hospital services based on physician/advanced practitioner order or complex needs related to functional status, cognitive ability, or social support system  Outcome: Progressing     Problem: Knowledge Deficit  Goal: Patient/family/caregiver demonstrates understanding of disease process, treatment plan, medications, and discharge instructions  Description: Complete learning assessment and assess knowledge base  Interventions:  - Provide teaching at level of understanding  - Provide teaching via preferred learning methods  Outcome: Progressing     Problem: Nutrition/Hydration-ADULT  Goal: Nutrient/Hydration intake appropriate for improving, restoring or maintaining nutritional needs  Description: Monitor and assess patient's nutrition/hydration status for malnutrition  Collaborate with interdisciplinary team and initiate plan and interventions as ordered  Monitor patient's weight and dietary intake as ordered or per policy  Utilize nutrition screening tool and intervene as necessary  Determine patient's food preferences and provide high-protein, high-caloric foods as appropriate       INTERVENTIONS:  - Monitor oral intake, urinary output, labs, and treatment plans  - Assess nutrition and hydration status and recommend course of action  - Evaluate amount of meals eaten  - Assist patient with eating if necessary   - Allow adequate time for meals  - Recommend/ encourage appropriate diets, oral nutritional supplements, and vitamin/mineral supplements  - Order, calculate, and assess calorie counts as needed  - Recommend, monitor, and adjust tube feedings and TPN/PPN based on assessed needs  - Assess need for intravenous fluids  - Provide specific nutrition/hydration education as appropriate  - Include patient/family/caregiver in decisions related to nutrition  Outcome: Progressing

## 2021-02-24 NOTE — ASSESSMENT & PLAN NOTE
Lab Results   Component Value Date    EGFR 49 02/24/2021    EGFR 60 02/23/2021    EGFR 58 02/22/2021    CREATININE 1 50 (H) 02/24/2021    CREATININE 1 28 02/23/2021    CREATININE 1 30 02/22/2021     · Baseline serum creatinine of 1 3-1 6 mg/dl  · Avoid all nephrotoxic agents  · Serial laboratory testing to monitor the patient's renal function and electrolytes

## 2021-02-24 NOTE — PROGRESS NOTES
Progress Note - Odette Neves 1960, 57 y o  male MRN: [id number]    Unit/Bed#: 410-01 Encounter: [id number]    Primary Care Provider: Kenzie Sherwood DO   Date and time admitted to hospital: 2/22/2021  9:27 AM        * Acute on chronic diastolic CHF (congestive heart failure) [facility])  Assessment & Plan  Wt Readings from Last 3 Encounters:   02/24/21 (!) 165 kg (363 lb 8 6 oz)   01/13/21 (!) 159 kg (351 lb 6 4 oz)   01/12/21 (!) 161 kg (356 lb)     · Approximately, a 30 lbs  weight gain over the last few months  · Troponin levels were within normal limits x 3 sets  · Continue lasix 40 mg IV TID  · Continue coreg 6 25 mg PO BID  · The patient was seen in consultation by Cardiology  · Monitor the patient's renal function closely while receiving IV lasix treatment  · Daily weights  · Strict intake/output measurements  · PT/OT        Pleural effusion on right  Assessment & Plan  · Recurrent right-sided pleural effusion  · Continue lasix 40 mg IV TID  · Successful right-sided thoracentesis by [facility], which yielded 1600 ml   · Appears to be a transudative pleural effusion by fluid analysis  · The patient was seen in consultation by Pulmonology  Hypoxia  Assessment & Plan  · Initially required 4 lpm of continuous supplemental oxygen via the nasal cannula to maintain oxygen saturations at 92% and above  · Successfully weaned off supplemental oxygen  · Secondary to right-sided pleural effusion  · Respiratory protocol  · Incentive spirometry 10 times per hour while awake    Cirrhosis of liver with ascites [facility])  Assessment & Plan  · The patient was seen in consultation by Gastroenterology    · Avoid all hepatotoxic agents  · Outpatient hepatocellular carcinoma surveillance and follow-up with Gastroenterology    US liver doppler only  Status: Final result   PACS Images     Show images for US liver doppler only   Study Result    ABDOMEN ULTRASOUND, COMPLETE WITH DOPPLER     INDICATION:    Cirrhosis with ascites        COMPARISON:  Compared with 10/2/2020     TECHNIQUE:   Real-time ultrasound of the abdomen was performed with a curvilinear transducer with Doppler evaluation of liver      FINDINGS:     The main portal vein and primary branch segments are patent and hepatopetal with normal spectral waveform  Peak systolic velocity at 32 cm/s  The right at 42 cm/s and the left at 59 cm/s      Hepatic veins are patent  Spectral waveforms within normal limits  Right hepatic vein at 57 cm/s, mid hepatic vein at 56 cm/s and left hepatic vein at 77 cm/s        Main hepatic artery appears normal size, patent with normal spectral waveform      Splenic vein is patent  56 cm/s      IMPRESSION:     Patent portal vein and hepatic veins  Atelectasis  Assessment & Plan  · Incentive spirometry 10 times per hour while awake    Vitamin D deficiency  Assessment & Plan  · Initiate cholecalciferol 2000 I  U  PO Qdaily  · Recheck a vitamin D 25-OH level in 1-2 months with his PCP    Results for mary Manjeet (MRN [id number]) as of 2/24/2021 12:28   Ref   Range 2/23/2021 05:26   Vit D, 25-Hydroxy Latest Ref Range: 30 0 - 100 0 ng/mL 13 1 (L)           Pulmonary hypertension (HCC)  Assessment & Plan  · Outpatient follow-up with Pulmonology    Constipation  Assessment & Plan  · Give miralax 17 grams PO x 1 dose on 02/24/2021    Hyponatremia  Assessment & Plan  · Likely hypervolemic hyponatremia  · Resolved with IV lasix treatment  · Continue lasix 40 mg IV TID  · Follow the sodium level    Results from last 7 days   Lab Units 02/24/21  0637 02/23/21  0527 02/22/21  0945   SODIUM mmol/L 136 135* 136   POTASSIUM mmol/L 3 6 4 1 3 7   CHLORIDE mmol/L 98* 97* 97*   CO2 mmol/L 35* 31 33*   BUN mg/dL 21 20 18   CREATININE mg/dL 1 50* 1 28 1 30   CALCIUM mg/dL 8 6 8 5 8 6         Tobacco use  Assessment & Plan  · Nicotine patch  · Smoking cessation counseling    Type 2 diabetes mellitus with hyperglycemia, without long-term current use of insulin [facility])  Assessment & Plan  Lab Results   Component Value Date    HGBA1C 6 1 (H) 01/05/2021       Recent Labs     02/23/21  1545 02/23/21  2105 02/24/21  0649 02/24/21  1136   POCGLU 133 159* 125 117       Blood Sugar Average: Last 72 hrs:  (P) 139 125   · Hold metformin in the setting of a lactic acidosis  · Consider the initiation of an ACE-inhibitor for renal protection in the setting of type 2 diabetes mellitus if his renal function remains stable  · Insulin sliding scale with blood glucose monitoring ACHS    Lesion of spleen  Assessment & Plan  · Outpatient surveillance imaging with his PCP    Diverticulosis of colon  Assessment & Plan  · Outpatient follow-up with Gastroenterology    Permanent atrial fibrillation ([facility] [address] )  Assessment & Plan  · Continue PO coreg and PO digoxin  · Continue anticoagulation with PO coumadin for a goal INR of 2-3  · Outpatient follow-up with Cardiology    Results for Thang Kingsley (MRN [id number]) as of 2/24/2021 12:39   Ref   Range 2/23/2021 05:27   DIGOXIN LEVEL Latest Ref Range: 0 8 - 2 0 ng/mL 1 7       Lactic acidosis  Assessment & Plan  · Likely secondary to chronic metformin use  · Resolved with thiamine 200 mg IV x 1 dose on 02/22/2021    CKD (chronic kidney disease) stage 3, GFR 30-59 ml/min  Assessment & Plan  Lab Results   Component Value Date    EGFR 49 02/24/2021    EGFR 60 02/23/2021    EGFR 58 02/22/2021    CREATININE 1 50 (H) 02/24/2021    CREATININE 1 28 02/23/2021    CREATININE 1 30 02/22/2021     · Baseline serum creatinine of 1 3-1 6 mg/dl  · Avoid all nephrotoxic agents  · Serial laboratory testing to monitor the patient's renal function and electrolytes    STEFF (obstructive sleep apnea)  Assessment & Plan  · Continue BIPAP at 17/11 QHS and anytime while sleeping      VTE Pharmacologic Prophylaxis:   Pharmacologic: Warfarin (Coumadin) for a goal INR of 2-3  Mechanical VTE Prophylaxis in Place: Yes    Patient Centered Rounds: I have performed bedside rounds with nursing staff today  Discussions with Specialists or Other Care Team Provider: I discussed the case with Cardiology and Gastroenterology  Time Spent for Care: 30 minutes  More than 50% of total time spent on counseling and coordination of care as described above  Current Length of Stay: 2 day(s)    Current Patient Status: Inpatient   Certification Statement: The patient will continue to require additional inpatient hospital stay due to the need for IV lasix treatment  Code Status: Level 1 - Full Code      Subjective: The patient was seen and examined  The patient is doing better  The shortness of breath is improving  No chest pain  No abdominal pain  No nausea or vomiting  Objective:     Vitals:   Temp (24hrs), Av 3 °F (36 8 °C), Min:98 °F (36 7 °C), Max:98 6 °F (37 °C)    Temp:  [98 °F (36 7 °C)-98 6 °F (37 °C)] 98 °F (36 7 °C)  HR:  [60-64] 63  Resp:  [16-18] 16  BP: (122-127)/(62-80) 122/62  SpO2:  [93 %-96 %] 94 %  Body mass index is 50 7 kg/m²  Input and Output Summary (last 24 hours):        Intake/Output Summary (Last 24 hours) at 2021 1243  Last data filed at 2021 1207  Gross per 24 hour   Intake 960 ml   Output 1000 ml   Net -40 ml       Physical Exam:     Physical Exam  General:  NAD, follows commands  HEENT:  NC/AT, mucous membranes moist  Neck:  Supple, No JVP elevation  CV:  + S1, + S2, Irregularly irregular rhythm, Regular rate  Pulm:  Bibasilar crackles  Abd:  Soft, Non-tender, Non-distended  Ext:  No clubbing/cyanosis/edema  Skin:  No rashes  Neuro:  Awake, alert, oriented  Psych:  Normal mood and affect      Additional Data:    Labs:    Results from last 7 days   Lab Units 21  0637   WBC Thousand/uL 6 44   HEMOGLOBIN g/dL 9 5*   HEMATOCRIT % 31 9*   PLATELETS Thousands/uL 175   NEUTROS PCT % 77*   LYMPHS PCT % 10*   MONOS PCT % 8   EOS PCT % 4     Results from last 7 days   Lab Units 21  0637   SODIUM mmol/L 136   POTASSIUM mmol/L 3 6 CHLORIDE mmol/L 98*   CO2 mmol/L 35*   BUN mg/dL 21   CREATININE mg/dL 1 50*   ANION GAP mmol/L 3*   CALCIUM mg/dL 8 6   ALBUMIN g/dL 2 7*   TOTAL BILIRUBIN mg/dL 1 10*   ALK PHOS U/L 121*   ALT U/L 20   AST U/L 20   GLUCOSE RANDOM mg/dL 124     Results from last 7 days   Lab Units 02/24/21  0637   INR  2 40*     Results from last 7 days   Lab Units 02/24/21  1136 02/24/21  0649 02/23/21  2105 02/23/21  1545 02/23/21  1103 02/23/21  0726 02/22/21  2037 02/22/21  1522   POC GLUCOSE mg/dl 117 125 159* 133 123 132 170* 154*         Results from last 7 days   Lab Units 02/24/21  0637 02/23/21  0527 02/23/21  0525 02/22/21  0945   LACTIC ACID mmol/L  --  0 9  --  2 1*   PROCALCITONIN ng/ml 0 21  --  0 27*  --            * I Have Reviewed All Lab Data Listed Above  * Additional Pertinent Lab Tests Reviewed: Zac [facility] Admission Reviewed      Recent Cultures (last 7 days):     Results from last 7 days   Lab Units 02/23/21  1016 02/22/21  1159 02/22/21  0945   BLOOD CULTURE   --  No Growth at 24 hrs  No Growth at 24 hrs     GRAM STAIN RESULT  Rare Polys  No bacteria seen  --   --        Last 24 Hours Medication List:   Current Facility-Administered Medications   Medication Dose Route Frequency Provider Last Rate    carvedilol  6 25 mg Oral BID With Meals [facility], DO      [START ON 2/25/2021] cholecalciferol  2,000 Units Oral Daily [facility], DO      digoxin  250 mcg Oral Daily [facility], DO      furosemide  40 mg Intravenous TID (diuretic) [facility], DO      insulin lispro  1-6 Units Subcutaneous HS [facility], DO      insulin lispro  2-12 Units Subcutaneous TID AC [facility], DO      nicotine  1 patch Transdermal Daily [facility], DO      oxyCODONE  5 mg Oral Q4H PRN [facility], DO      pantoprazole  40 mg Oral Early Morning [employer] [facility], DO      potassium chloride  40 mEq Oral BID With Meals [facility], DO      warfarin  5 mg Oral Daily (warfarin) Dipak Bartlett DO          Today, Patient Was Seen By: Dipak Bartlett DO    ** Please Note: Dictation voice to text software may have been used in the creation of this document   **

## 2021-02-24 NOTE — ASSESSMENT & PLAN NOTE
· Continue PO coreg and PO digoxin  · Continue anticoagulation with PO coumadin for a goal INR of 2-3  · Outpatient follow-up with Cardiology    Results for Christiano Baird (MRN [id number]) as of 2/24/2021 12:39   Ref   Range 2/23/2021 05:27   DIGOXIN LEVEL Latest Ref Range: 0 8 - 2 0 ng/mL 1 7

## 2021-02-24 NOTE — ASSESSMENT & PLAN NOTE
· Recurrent right-sided pleural effusion  · Continue lasix 40 mg IV TID  · Successful right-sided thoracentesis by [facility] Radiology, which yielded 1600 ml   · Appears to be a transudative pleural effusion by fluid analysis  · The patient was seen in consultation by Pulmonology

## 2021-02-24 NOTE — PHYSICAL THERAPY NOTE
PHYSICAL THERAPY NOTE          Patient Name: Caroline Fritz  RMJPO'Y Date: 2/24/2021 02/24/21 1122   Note Type   Note Type Treatment   Restrictions/Precautions   Other Precautions Chair Alarm; Bed Alarm   Cognition   Overall Cognitive Status [facility]   Arousal/Participation Alert; Cooperative   Following Commands Follows all commands and directions without difficulty   Transfers   Sit to Stand 5  Supervision   Additional items Armrests   Stand to Sit 5  Supervision   Additional items Armrests   Stand pivot 5  Supervision   Ambulation/Elevation   Gait pattern Excessively slow; Short stride; Forward Flexion;Decreased foot clearance   Gait Assistance 5  Supervision   Assistive Device Straight cane   Distance 125'   Balance   Ambulatory [facility])   Endurance Deficit   Endurance Deficit Yes   Endurance Deficit Description mild SOB with exertion   Activity Tolerance   Activity Tolerance Patient limited by fatigue   Exercises   Hip Flexion Standing;20 reps   Hip Abduction Standing;20 reps   Hip Adduction Standing;20 reps   Knee AROM Long Arc Quad Sitting;20 reps   Ankle Pumps Sitting;20 reps   Assessment   Prognosis Good   Problem List Decreased strength;Decreased endurance; Impaired balance;Decreased mobility   Assessment Pt  seen for PT treatment session this date with interventions consisting of  Therapeutic exercises, transfers and  gait training w/ emphasis on improving pt's ability to ambulate  Pt  Currently performing  tx and ambulation at ( SUP) x 1 level of function  Utilizing [address] with fair balance and stability  The patient's AM-PAC Basic Mobility Inpatient Short Form Raw Score is 23, Standardized Score is 50  88  A standardized score greater than 42 9 suggests the patient may benefit from discharge to home  Please also refer to physical therapy recommendation for safe DC planning  In comparison to previous session, Pt   With improvements in activity tolerance since thoracentesis yesterday  Minimal c/o SOB with ambulation  SpO2 WFL's RA  Leena Galdamez Pt is in need of continued activity in PT to improve strength balance endurance mobility transfers and ambulation with return to maximize LOF  From PT/mobility standpoint, recommendation at time of d/c would be home PT  in order to promote return to PLOF and independence  Goals   LTG Expiration Date 03/09/21   PT Treatment Day 1   Plan   Treatment/Interventions Functional transfer training;LE strengthening/ROM; Therapeutic exercise; Endurance training;Bed mobility;Gait training   Progress Progressing toward goals   Recommendation   PT Discharge Recommendation Home with skilled therapy   AM-PAC Basic Mobility Inpatient   Turning in Bed Without Bedrails 4   Lying on Back to Sitting on Edge of Flat Bed 4   Moving Bed to Chair 4   Standing Up From Chair 4   Walk in Room 4   Climb 3-5 Stairs 3   Basic Mobility Inpatient Raw Score 23   Basic Mobility Standardized Score 50 88   Pt  OOB In chair   with call bell within reach  at end of PT session  Discussed with  PT today's treatment and patient's current level of function for care coordination

## 2021-02-24 NOTE — ASSESSMENT & PLAN NOTE
Wt Readings from Last 3 Encounters:   02/24/21 (!) 165 kg (363 lb 8 6 oz)   01/13/21 (!) 159 kg (351 lb 6 4 oz)   01/12/21 (!) 161 kg (356 lb)     · Approximately, a 30 lbs  weight gain over the last few months  · Troponin levels were within normal limits x 3 sets  · Continue lasix 40 mg IV TID  · Continue coreg 6 25 mg PO BID  · The patient was seen in consultation by Cardiology    · Monitor the patient's renal function closely while receiving IV lasix treatment  · Daily weights  · Strict intake/output measurements  · PT/OT

## 2021-02-25 NOTE — UTILIZATION REVIEW
Reference # [id number]    The case was denied by Dc Armasightmathew for inpatient level care  The following information will be sent for reconsideration of the denial     2/22/21:   The patient is a 34-year-old male who presented with shortness of breath  He was 40 lb over his dry weight  This is despite Lasix being doubled as an outpatient and metolazone twice a week  He had pleural effusion and abdominal wall ascites  He was initially requiring 4 L for oxygen saturation 87% on room air  The patient was started on Lasix 40 mg intravenous every 8 hours  The following diagnoses were made       Acute on chronic diastolic heart failure  Acute hypoxic respiratory failure     2/23/21:   Based on his degree of volume overload, it was felt that he would require 5 to 7 days of intravenous diuresis  Examination revealed JVD and rales  He underwent thoracentesis by IR, 1 6 L removed from the right  He was able to be weaned off of supplemental oxygen  Creatinine remained near baseline at 1 28      2/24/21:    He continued to have edema and abdominal distension  Shortness of breath was gradually improving  Lung examination revealed bibasilar crackles  Minimum oxygen saturation was 92 to 93% on room air at rest   Creatinine increased to 1 50 so diuretics were not increased but maintained every 8 hours      2/25/21:    Creatinine is 1 55  No significant drop in weight    He has been transitioned to Lasix continuous infusion 10 mg/hr      Tracy Noel DO      Pertinent Labs/Diagnostic Results:     Results from last 7 days   Lab Units 02/22/21  0947   SARS-COV-2  Negative     Results from last 7 days   Lab Units 02/25/21  0528 02/24/21  0637 02/23/21  0814 02/22/21  0945   WBC Thousand/uL 5 97 6 44 6 26 5 81   HEMOGLOBIN g/dL 9 3* 9 5* 9 0* 9 7*   HEMATOCRIT % 31 6* 31 9* 29 5* 32 1*   PLATELETS Thousands/uL 161 175 169 198   NEUTROS ABS Thousands/µL 4 35 4 96 4 79 4 22         Results from last 7 days   Lab Units 02/25/21  0528 02/24/21  0637 02/23/21  0527 02/22/21  0945   SODIUM mmol/L 137 136 135* 136   POTASSIUM mmol/L 4 0 3 6 4 1 3 7   CHLORIDE mmol/L 98* 98* 97* 97*   CO2 mmol/L 32 35* 31 33*   ANION GAP mmol/L 7 3* 7 6   BUN mg/dL 21 21 20 18   CREATININE mg/dL 1 55* 1 50* 1 28 1 30   EGFR ml/min/1 73sq m 47 49 60 58   CALCIUM mg/dL 8 5 8 6 8 5 8 6   MAGNESIUM mg/dL 2 1 2 1 2 2 2 1   PHOSPHORUS mg/dL 3 4 3 3  --   --      Results from last 7 days   Lab Units 02/25/21  0528 02/24/21  0637 02/23/21  0527 02/22/21  0945   AST U/L 20 20 30 21   ALT U/L 17 20 16 17   ALK PHOS U/L 126* 121* 123* 126*   TOTAL PROTEIN g/dL 7 2 7 3 7 6 7 4   ALBUMIN g/dL 2 6* 2 7* 2 5* 2 6*   TOTAL BILIRUBIN mg/dL 0 90 1 10* 0 90 0 60     Results from last 7 days   Lab Units 02/25/21  1056 02/25/21  0649 02/24/21  2042 02/24/21  1520 02/24/21  1136 02/24/21  0649 02/23/21  2105 02/23/21  1545 02/23/21  1103 02/23/21  0726 02/22/21  2037 02/22/21  1522   POC GLUCOSE mg/dl 125 120 140 119 117 125 159* 133 123 132 170* 154*     Results from last 7 days   Lab Units 02/25/21  0528 02/24/21  0637 02/23/21  0527 02/22/21  0945   GLUCOSE RANDOM mg/dL 133 124 129 148*             Results from last 7 days   Lab Units 02/23/21  0527   CK TOTAL U/L 44     Results from last 7 days   Lab Units 02/24/21  0637 02/23/21  0525 02/22/21  0945   TROPONIN I ng/mL 0 03 0 03 0 03         Results from last 7 days   Lab Units 02/25/21  0528 02/24/21  0637 02/23/21  0814 02/22/21  0945   PROTIME seconds 25 5* 25 7* 26 9* 25 4*   INR  2 39* 2 40* 2 56* 2 37*   PTT seconds  --   --   --  51*         Results from last 7 days   Lab Units 02/24/21  0637 02/23/21  0525   PROCALCITONIN ng/ml 0 21 0 27*     Results from last 7 days   Lab Units 02/23/21  0527 02/22/21  0945   LACTIC ACID mmol/L 0 9 2 1*         Results from last 7 days   Lab Units 02/23/21  0527 02/22/21  0945   DIGOXIN LVL ng/mL 1 7 1 6     Results from last 7 days   Lab Units 02/22/21  0945   NT-PRO BNP pg/mL 1,667*             Results from last 7 days   Lab Units 02/22/21  0945   LIPASE u/L 291                 Results from last 7 days   Lab Units 02/22/21  0947   INFLUENZA A PCR  Negative   INFLUENZA B PCR  Negative   RSV PCR  Negative                             Results from last 7 days   Lab Units 02/23/21  1016 02/22/21  1159 02/22/21  0945   BLOOD CULTURE   --  No Growth at 48 hrs  No Growth at 48 hrs  GRAM STAIN RESULT  Rare Polys  No bacteria seen  --   --    BODY FLUID CULTURE, STERILE  Culture results to follow    --   --      Results from last 7 days   Lab Units 02/23/21  1016   TOTAL COUNTED  100   WBC FLUID /ul 117           Vital Signs:     Date/Time  Temp  Pulse  Resp  BP  MAP  SpO2 O2 Device   02/25/21 06:50:20  98 1 °F (36 7 °C)  65  18  117/81  93  99 % --   02/25/21 0538  98 °F (36 7 °C)  59  17  128/77  94  98 % --   02/25/21 03:56:40  98 1 °F (36 7 °C)  61  18  131/78  96  93 % None (Room air)   02/24/21 2051  --  --  --  --  --  -- None (Room air)   02/24/21 20:44:07  98 6 °F (37 °C)  69  20  120/63  82  93 % --   02/24/21 19:16:55  98 1 °F (36 7 °C)  72  18  123/63  83  92 % --   02/24/21 15:21:09  98 6 °F (37 °C)  65  18  124/61  82  94 % --   02/24/21 11:36:58  --  63  --  122/62  82  94 % --   02/24/21 08:17:04  --  64  --  123/63  83  96 % --   02/24/21 05:30:21  98 °F (36 7 °C)  60  16  127/78  94  96 % --   02/24/21 0118  --  --  --  --  --  95 % Other (comment)   02/23/21 22:48:50  98 6 °F (37 °C)  61  18  125/73  90  93 % Other (comment) BiPap   02/23/21 2215  --  --  --  --  --  94 % Other (comment)   02/23/21 1951  --  --  --  --  --  -- None (Room air)   02/23/21 17:27:04  --  61  --  124/74  91  96 % --   02/23/21 15:43:39  98 2 °F (36 8 °C)  60  18  126/80  95  96 % --   02/23/21 11:37:13  --  62  --  133/84  100  97 % --   02/23/21 07:27:06  97 4 °F (36 3 °C)Abnormal   65  18  115/75  88  98 % None (Room air)   02/23/21 05:38:46  --  73  --  146/85  105  96 % -- Medications:   Scheduled Medications:      carvedilol, 6 25 mg, Oral, BID With Meals  cholecalciferol, 2,000 Units, Oral, Daily  digoxin, 250 mcg, Oral, Daily  docusate sodium, 100 mg, Oral, BID  insulin lispro, 1-6 Units, Subcutaneous, HS  insulin lispro, 2-12 Units, Subcutaneous, TID AC  nicotine, 1 patch, Transdermal, Daily  pantoprazole, 40 mg, Oral, Early Morning  warfarin, 5 mg, Oral, Daily (warfarin)    furosemide (LASIX) injection 40 mg   Dose: 40 mg  Freq: 3 times daily (diuretic) Route: IV  Start: 02/22/21 1800 End: 02/25/21 1002         Continuous IV Infusions:    furosemide (LASIX) 500 mg infusion 50 mL   Rate: 1 mL/hr Dose: 10 mg/hr  Freq: Continuous Route: IV  Last Dose: 10 mg/hr (02/25/21 1207)  Start: 02/25/21 1200            PRN Meds:  oxyCODONE, 5 mg, Oral, Q4H PRN                Network Utilization Review Department  ATTENTION: Please call with any questions or concerns to [phone] and carefully listen to the prompts so that you are directed to the right person  All voicemails are confidential   Jeannine Guevara all requests for admission clinical reviews, approved or denied determinations and any other requests to dedicated fax number below belonging to the campus where the patient is receiving treatment   List of dedicated fax numbers for the Facilities:  [address] DENIALS (Administrative/Medical Necessity) [phone]   [address] (Maternity/NICU/Pediatrics) [phone] [address] [phone]   [address] [address] [facility] Blayne Brandenswapnil Watson 1882 (  Mello Mckenna "Laureen" 103) [address] [address] [place] [address] [phone]

## 2021-02-25 NOTE — ASSESSMENT & PLAN NOTE
· Likely hypervolemic hyponatremia  · Resolved with IV lasix treatment  · Initiated Lasix gtt @ 10 mg/hr  · Follow the sodium level    Results from last 7 days   Lab Units 02/25/21  0528 02/24/21  0637 02/23/21  0527   SODIUM mmol/L 137 136 135*   POTASSIUM mmol/L 4 0 3 6 4 1   CHLORIDE mmol/L 98* 98* 97*   CO2 mmol/L 32 35* 31   BUN mg/dL 21 21 20   CREATININE mg/dL 1 55* 1 50* 1 28   CALCIUM mg/dL 8 5 8 6 8 5

## 2021-02-25 NOTE — ASSESSMENT & PLAN NOTE
· Continue PO coreg and PO digoxin  · Continue anticoagulation with PO coumadin for a goal INR of 2-3  · Outpatient follow-up with Cardiology    Results for Dominguez Dey (MRN [id number]) as of 2/24/2021 12:39   Ref   Range 2/23/2021 05:27   DIGOXIN LEVEL Latest Ref Range: 0 8 - 2 0 ng/mL 1 7

## 2021-02-25 NOTE — PROGRESS NOTES
Progress Note - John Zeng 1960, 57 y o  male MRN: [id number]    Unit/Bed#: 410-01 Encounter: [id number]    Primary Care Provider: Tunde Mota DO   Date and time admitted to hospital: 2/22/2021  9:27 AM        * Acute on chronic diastolic CHF (congestive heart failure) [facility])  Assessment & Plan  Wt Readings from Last 3 Encounters:   02/25/21 (!) 165 kg (363 lb 1 6 oz)   01/13/21 (!) 159 kg (351 lb 6 4 oz)   01/12/21 (!) 161 kg (356 lb)     · Approximately, a 30 lbs  weight gain over the last few months  · Troponin levels were within normal limits x 3 sets  · Transitioned to Lasix gtt @ 10 mg/hr, titrate as needed  · Continue coreg 6 25 mg PO BID  · The patient was seen in consultation by Cardiology  · Monitor the patient's renal function closely while receiving Lasix gtt treatment  · Daily weights  · Strict intake/output measurements  · PT/OT        Atelectasis  Assessment & Plan  · Incentive spirometry 10 times per hour while awake    Vitamin D deficiency  Assessment & Plan  · Initiate cholecalciferol 2000 I  U  PO Qdaily  · Recheck a vitamin D 25-OH level in 1-2 months with his PCP    Results for Amadeo Peralta (MRN [id number]) as of 2/24/2021 12:28   Ref   Range 2/23/2021 05:26   Vit D, 25-Hydroxy Latest Ref Range: 30 0 - 100 0 ng/mL 13 1 (L)           Pulmonary hypertension (HCC)  Assessment & Plan  · Outpatient follow-up with Pulmonology    Constipation  Assessment & Plan  · Give miralax 17 grams PO x 1 dose on 02/24/2021  · Initiated Colace 100 mg PO BID    Hyponatremia  Assessment & Plan  · Likely hypervolemic hyponatremia  · Resolved with IV lasix treatment  · Initiated Lasix gtt @ 10 mg/hr  · Follow the sodium level    Results from last 7 days   Lab Units 02/25/21  0528 02/24/21  0637 02/23/21  0527   SODIUM mmol/L 137 136 135*   POTASSIUM mmol/L 4 0 3 6 4 1   CHLORIDE mmol/L 98* 98* 97*   CO2 mmol/L 32 35* 31   BUN mg/dL 21 21 20   CREATININE mg/dL 1 55* 1 50* 1 28   CALCIUM mg/dL 8 5 8 6 8 5         Tobacco use  Assessment & Plan  · Nicotine patch  · Smoking cessation counseling    Hypoxia  Assessment & Plan  · Initially required 4 lpm of continuous supplemental oxygen via the nasal cannula to maintain oxygen saturations at 92% and above  · Successfully weaned off supplemental oxygen  · Secondary to right-sided pleural effusion  · Respiratory protocol  · Incentive spirometry 10 times per hour while awake    Type 2 diabetes mellitus with hyperglycemia, without long-term current use of insulin [facility])  Assessment & Plan  Lab Results   Component Value Date    HGBA1C 6 1 (H) 01/05/2021       Recent Labs     02/24/21  1136 02/24/21  1520 02/24/21  2042 02/25/21  0649   POCGLU 117 119 140 120       Blood Sugar Average: Last 72 hrs:  (P) 248 [id number]   · Hold metformin in the setting of a lactic acidosis  · Consider the initiation of an ACE-inhibitor for renal protection in the setting of type 2 diabetes mellitus if his renal function remains stable  · Insulin sliding scale with blood glucose monitoring ACHS    Lesion of spleen  Assessment & Plan  · Outpatient surveillance imaging with his PCP    Diverticulosis of colon  Assessment & Plan  · Outpatient follow-up with Gastroenterology    Permanent atrial fibrillation ([facility] )  Assessment & Plan  · Continue PO coreg and PO digoxin  · Continue anticoagulation with PO coumadin for a goal INR of 2-3  · Outpatient follow-up with Cardiology    Results for Thang Kingsley (MRN [id number]) as of 2/24/2021 12:39   Ref   Range 2/23/2021 05:27   DIGOXIN LEVEL Latest Ref Range: 0 8 - 2 0 ng/mL 1 7       Lactic acidosis  Assessment & Plan  · Likely secondary to chronic metformin use  · Resolved with thiamine 200 mg IV x 1 dose on 02/22/2021    CKD (chronic kidney disease) stage 3, GFR 30-59 ml/min  Assessment & Plan  Lab Results   Component Value Date    EGFR 47 02/25/2021    EGFR 49 02/24/2021    EGFR 60 02/23/2021    CREATININE 1 55 (H) 02/25/2021    CREATININE 1 50 (H) 02/24/2021    CREATININE 1 28 02/23/2021     · Baseline serum creatinine of 1 3-1 6 mg/dl  · Avoid all nephrotoxic agents  · Serial laboratory testing to monitor the patient's renal function and electrolytes    Cirrhosis of liver with ascites [facility])  Assessment & Plan  · The patient was seen in consultation by Gastroenterology  · Avoid all hepatotoxic agents  · Outpatient hepatocellular carcinoma surveillance and follow-up with Gastroenterology    US liver doppler only  Status: Final result   PACS Images     Show images for US liver doppler only   Study Result    ABDOMEN ULTRASOUND, COMPLETE WITH DOPPLER     INDICATION:    Cirrhosis with ascites        COMPARISON:  Compared with 10/2/2020     TECHNIQUE:   Real-time ultrasound of the abdomen was performed with a curvilinear transducer with Doppler evaluation of liver      FINDINGS:     The main portal vein and primary branch segments are patent and hepatopetal with normal spectral waveform  Peak systolic velocity at 32 cm/s  The right at 42 cm/s and the left at 59 cm/s      Hepatic veins are patent  Spectral waveforms within normal limits  Right hepatic vein at 57 cm/s, mid hepatic vein at 56 cm/s and left hepatic vein at 77 cm/s        Main hepatic artery appears normal size, patent with normal spectral waveform      Splenic vein is patent  56 cm/s      IMPRESSION:     Patent portal vein and hepatic veins  Pleural effusion on right  Assessment & Plan  · Recurrent right-sided pleural effusion  · Transitioned to Lasix gtt @ 10 mg/hr, will titrate as needed  · Successful right-sided thoracentesis by [facility], which yielded 1600 ml   · Appears to be a transudative pleural effusion by fluid analysis  · The patient was seen in consultation by Pulmonology  STEFF (obstructive sleep apnea)  Assessment & Plan  · Continue BIPAP at 17/11 QHS and anytime while sleeping        Subjective:   Patient seen and examined at bedside  OOB in chair   Weight stable at 363 lbs today  Per cardiology recommendation will initiate Lasix gtt today  VSS  No acute complaints  Objective:     Vitals: Blood pressure 117/81, pulse 65, temperature 98 1 °F (36 7 °C), resp  rate 18, height 5' 11" (1 803 m), weight (!) 165 kg (363 lb 1 6 oz), SpO2 99 %  ,Body mass index is 50 64 kg/m²  Wt Readings from Last 3 Encounters:   02/25/21 (!) 165 kg (363 lb 1 6 oz)   01/13/21 (!) 159 kg (351 lb 6 4 oz)   01/12/21 (!) 161 kg (356 lb)       Intake/Output Summary (Last 24 hours) at 2/25/2021 1051  Last data filed at 2/25/2021 0815  Gross per 24 hour   Intake 600 ml   Output 1900 ml   Net -1300 ml       Physical Exam:     Physical Exam  Vitals signs reviewed  Constitutional:       General: He is not in acute distress  Appearance: He is not ill-appearing  HENT:      Head: Normocephalic  Nose: No congestion or rhinorrhea  Eyes:      General: No scleral icterus  Right eye: No discharge  Left eye: No discharge  Cardiovascular:      Rate and Rhythm: Normal rate  Heart sounds: No murmur  Pulmonary:      Effort: Pulmonary effort is normal  No respiratory distress  Comments: Crackles in right sided lung  Abdominal:      General: Abdomen is flat  Tenderness: There is no abdominal tenderness  Comments: Obese abdomen   Musculoskeletal:      Right lower leg: Edema present  Left lower leg: Edema present  Comments: Chronic venous stasis change in bilateral lower shin     Skin:     General: Skin is warm  Neurological:      General: No focal deficit present  Mental Status: He is alert and oriented to person, place, and time     Psychiatric:         Mood and Affect: Mood normal          Behavior: Behavior normal          Recent Results (from the past 24 hour(s))   Fingerstick Glucose (POCT)    Collection Time: 02/24/21 11:36 AM   Result Value Ref Range    POC Glucose 117 65 - 140 mg/dl   Fingerstick Glucose (POCT)    Collection Time: 02/24/21  3:20 PM   Result Value Ref Range    POC Glucose 119 65 - 140 mg/dl   Fingerstick Glucose (POCT)    Collection Time: 02/24/21  8:42 PM   Result Value Ref Range    POC Glucose 140 65 - 140 mg/dl   Protime-INR    Collection Time: 02/25/21  5:28 AM   Result Value Ref Range    Protime 25 5 (H) 11 6 - 14 5 seconds    INR 2 39 (H) 0 84 - 1 19   CBC and differential    Collection Time: 02/25/21  5:28 AM   Result Value Ref Range    WBC 5 97 4 31 - 10 16 Thousand/uL    RBC 3 45 (L) 3 88 - 5 62 Million/uL    Hemoglobin 9 3 (L) 12 0 - 17 0 g/dL    Hematocrit 31 6 (L) 36 5 - 49 3 %    MCV 92 82 - 98 fL    MCH 27 0 26 8 - 34 3 pg    MCHC 29 4 (L) 31 4 - 37 4 g/dL    RDW 17 1 (H) 11 6 - 15 1 %    MPV 10 4 8 9 - 12 7 fL    Platelets 449 812 - 552 Thousands/uL    nRBC 0 /100 WBCs    Neutrophils Relative 73 43 - 75 %    Immat GRANS % 1 0 - 2 %    Lymphocytes Relative 11 (L) 14 - 44 %    Monocytes Relative 9 4 - 12 %    Eosinophils Relative 5 0 - 6 %    Basophils Relative 1 0 - 1 %    Neutrophils Absolute 4 35 1 85 - 7 62 Thousands/µL    Immature Grans Absolute 0 03 0 00 - 0 20 Thousand/uL    Lymphocytes Absolute 0 67 0 60 - 4 47 Thousands/µL    Monocytes Absolute 0 53 0 17 - 1 22 Thousand/µL    Eosinophils Absolute 0 32 0 00 - 0 61 Thousand/µL    Basophils Absolute 0 07 0 00 - 0 10 Thousands/µL   Comprehensive metabolic panel    Collection Time: 02/25/21  5:28 AM   Result Value Ref Range    Sodium 137 136 - 145 mmol/L    Potassium 4 0 3 5 - 5 3 mmol/L    Chloride 98 (L) 100 - 108 mmol/L    CO2 32 21 - 32 mmol/L    ANION GAP 7 4 - 13 mmol/L    BUN 21 5 - 25 mg/dL    Creatinine 1 55 (H) 0 60 - 1 30 mg/dL    Glucose 133 65 - 140 mg/dL    Calcium 8 5 8 3 - 10 1 mg/dL    Corrected Calcium 9 6 8 3 - 10 1 mg/dL    AST 20 5 - 45 U/L    ALT 17 12 - 78 U/L    Alkaline Phosphatase 126 (H) 46 - 116 U/L    Total Protein 7 2 6 4 - 8 2 g/dL    Albumin 2 6 (L) 3 5 - 5 0 g/dL    Total Bilirubin 0 90 0 20 - 1 00 mg/dL    eGFR 47 ml/min/1 73sq m   Magnesium    Collection Time: 02/25/21  5:28 AM   Result Value Ref Range    Magnesium 2 1 1 6 - 2 6 mg/dL   Phosphorus    Collection Time: 02/25/21  5:28 AM   Result Value Ref Range    Phosphorus 3 4 2 3 - 4 1 mg/dL   Fingerstick Glucose (POCT)    Collection Time: 02/25/21  6:49 AM   Result Value Ref Range    POC Glucose 120 65 - 140 mg/dl       Current Facility-Administered Medications   Medication Dose Route Frequency Provider Last Rate Last Admin    carvedilol (COREG) tablet 6 25 mg  6 25 mg Oral BID With Meals [facility], DO   6 25 mg at 02/25/21 0830    cholecalciferol (VITAMIN D3) tablet 2,000 Units  2,000 Units Oral Daily [facility], DO   2,000 Units at 02/25/21 0829    digoxin (LANOXIN) tablet 250 mcg  250 mcg Oral Daily [facility], DO   250 mcg at 02/25/21 0830    docusate sodium (COLACE) capsule 100 mg  100 mg Oral BID [facility], DO   100 mg at 02/25/21 5869    furosemide (LASIX) 500 mg infusion 50 mL  10 mg/hr Intravenous Continuous Tia Maury, PA-C        insulin lispro (HumaLOG) 100 units/mL subcutaneous injection 1-6 Units  1-6 Units Subcutaneous HS [facility], DO   1 Units at 02/23/21 2152    insulin lispro (HumaLOG) 100 units/mL subcutaneous injection 2-12 Units  2-12 Units Subcutaneous TID [facility], DO   2 Units at 02/22/21 1532    nicotine (NICODERM CQ) 7 mg/24hr TD 24 hr patch 1 patch  1 patch Transdermal Daily [facility], DO        oxyCODONE (ROXICODONE) IR tablet 5 mg  5 mg Oral Q4H PRN [facility], DO   5 mg at 02/24/21 1738    pantoprazole (PROTONIX) EC tablet 40 mg  40 mg Oral Early Morning [facility], DO   40 mg at 02/25/21 1359    warfarin (COUMADIN) tablet 5 mg  5 mg Oral Daily (warfarin) [facility], DO   5 mg at 02/24/21 173       Invasive Devices     Peripheral Intravenous Line            Peripheral IV 02/22/21 Right Antecubital 2 days                Lab, Imaging and other studies: I have personally reviewed pertinent reports      VTE Pharmacologic Prophylaxis: Warfarin (Coumadin)  VTE Mechanical Prophylaxis: sequential compression device    Isatu Wallace MD

## 2021-02-25 NOTE — PHYSICAL THERAPY NOTE
PHYSICAL THERAPY NOTE          Patient Name: Catina Landeros  [id number] Date: 2/25/2021 02/25/21 7956   Note Type   Note Type Treatment   Pain Assessment   Pain Score No Pain   Restrictions/Precautions   Other Precautions   (Chair Alarm; Bed Alarm; Fall Risk)   Cognition   Overall Cognitive Status WFL   Arousal/Participation Alert; Cooperative   Following Commands Follows all commands and directions without difficulty   Subjective   Subjective Pt  would like to ambulate   Transfers   Sit to Stand 5  Supervision   Additional items Armrests   Stand to Sit 5  Supervision   Additional items Armrests   Stand pivot 5  Supervision   Ambulation/Elevation   Gait pattern Excessively slow; Short stride; Forward Flexion;Decreased foot clearance   Gait Assistance 5  Supervision   Assistive Device Straight cane   Distance 150'   Balance   Ambulatory Fair   Endurance Deficit   Endurance Deficit Yes   Activity Tolerance   Activity Tolerance Patient limited by fatigue   Exercises   Hip Flexion Standing;20 reps   Hip Abduction Standing;20 reps   Hip Adduction Standing;20 reps   Knee AROM Long Arc Quad Sitting;20 reps   Ankle Pumps Sitting;20 reps   Assessment   Prognosis Good   Problem List Decreased strength;Decreased endurance; Impaired balance;Decreased mobility;Obesity   Assessment Pt  seen for PT treatment session this date with interventions consisting of bed mobility, transfers and  gait training w/ emphasis on improving pt's ability to ambulate  Pt  Currently performing  tx and ambulation at ( SUP) x 1 level of function  Utilizing RW with fair balance and stability  The patient's AM-PAC Basic Mobility Inpatient Short Form Raw Score is 23, Standardized Score is 50  88  A standardized score greater than 42 9 suggests the patient may benefit from discharge to home  Please also refer to physical therapy recommendation for safe DC planning    In comparison to previous session, Pt  With improvements in activity tolerance  Slowly increasing distance  Pt is in need of continued activity in PT to improve strength balance endurance mobility transfers and ambulation with return to maximize LOF  From PT/mobility standpoint, recommendation at time of d/c would be home PT  in order to promote return to PLOF and independence  Goals   LTG Expiration Date 03/09/21   PT Treatment Day 2   Plan   Treatment/Interventions Functional transfer training;LE strengthening/ROM; Therapeutic exercise; Endurance training;Bed mobility;Gait training   Progress Progressing toward goals   Recommendation   PT Discharge Recommendation Home with skilled therapy   AM-PAC Basic Mobility Inpatient   Turning in Bed Without Bedrails 4   Lying on Back to Sitting on Edge of Flat Bed 4   Moving Bed to Chair 4   Standing Up From Chair 4   Walk in Room 4   Climb 3-5 Stairs 3   Basic Mobility Inpatient Raw Score 23   Basic Mobility Standardized Score 50 88     Pt  OOB in chair with call bell within reach at end of PT session  Discussed with  PT today's treatment and patient's current level of function for care coordination

## 2021-02-25 NOTE — ASSESSMENT & PLAN NOTE
Lab Results   Component Value Date    HGBA1C 6 1 (H) 01/05/2021       Recent Labs     02/24/21  1136 02/24/21  1520 02/24/21 2042 02/25/21  0649   POCGLU 117 119 140 120       Blood Sugar Average: Last 72 hrs:  (P) 351 [id number]   · Hold metformin in the setting of a lactic acidosis  · Consider the initiation of an ACE-inhibitor for renal protection in the setting of type 2 diabetes mellitus if his renal function remains stable  · Insulin sliding scale with blood glucose monitoring ACHS

## 2021-02-25 NOTE — PHYSICIAN ADVISOR
The case was denied by [facility] for inpatient level care  The following information will be sent for reconsideration of the denial       2/22/21:   The patient is a 70-year-old male who presented with shortness of breath  He was 40 lb over his dry weight  This is despite Lasix being doubled as an outpatient and metolazone twice a week  He had pleural effusion and abdominal wall ascites  He was initially requiring 4 L for oxygen saturation 87% on room air  The patient was started on Lasix 40 mg intravenous every 8 hours  The following diagnoses were made  Acute on chronic diastolic heart failure  Acute hypoxic respiratory failure    2/23/21:   Based on his degree of volume overload, it was felt that he would require 5 to 7 days of intravenous diuresis  Examination revealed JVD and rales  He underwent thoracentesis by IR, 1 6 L removed from the right  He was able to be weaned off of supplemental oxygen  Creatinine remained near baseline at 1 28     2/24/21:    He continued to have edema and abdominal distension  Shortness of breath was gradually improving  Lung examination revealed bibasilar crackles  Minimum oxygen saturation was 92 to 93% on room air at rest   Creatinine increased to 1 50 so diuretics were not increased but maintained every 8 hours  2/25/21:    Creatinine is 1 55  No significant drop in weight  He has been transitioned to Lasix continuous infusion 10 mg/hr      Toña Lopez DO

## 2021-02-25 NOTE — ASSESSMENT & PLAN NOTE
Lab Results   Component Value Date    EGFR 47 02/25/2021    EGFR 49 02/24/2021    EGFR 60 02/23/2021    CREATININE 1 55 (H) 02/25/2021    CREATININE 1 50 (H) 02/24/2021    CREATININE 1 28 02/23/2021     · Baseline serum creatinine of 1 3-1 6 mg/dl  · Avoid all nephrotoxic agents  · Serial laboratory testing to monitor the patient's renal function and electrolytes

## 2021-02-25 NOTE — ASSESSMENT & PLAN NOTE
· Recurrent right-sided pleural effusion  · Transitioned to Lasix gtt @ 10 mg/hr, will titrate as needed  · Successful right-sided thoracentesis by Inter[facility] Radiology, which yielded 1600 ml   · Appears to be a transudative pleural effusion by fluid analysis  · The patient was seen in consultation by Pulmonology

## 2021-02-25 NOTE — TELEPHONE ENCOUNTER
Tried calling patient to schedule an appointment, no answer  ----- [facility] sent at 2/25/2021  8:56 AM EST -----  Can you get follow up for him in  4 weeks? Thanks!     [employer]

## 2021-02-25 NOTE — PLAN OF CARE
Problem: Potential for Falls  Goal: Patient will remain free of falls  Description: INTERVENTIONS:  - Assess patient frequently for physical needs  -  Identify cognitive and physical deficits and behaviors that affect risk of falls    -  [place] fall precautions as indicated by assessment   - Educate patient/family on patient safety including physical limitations  - Instruct patient to call for assistance with activity based on assessment  - Modify environment to reduce risk of injury  - Consider OT/PT consult to assist with strengthening/mobility  Outcome: Progressing     Problem: CARDIOVASCULAR - ADULT  Goal: Maintains optimal cardiac output and hemodynamic stability  Description: INTERVENTIONS:  - Monitor I/O, vital signs and rhythm  - Monitor for S/S and trends of decreased cardiac output  - Administer and titrate ordered vasoactive medications to optimize hemodynamic stability  - Assess quality of pulses, skin color and temperature  - Assess for signs of decreased coronary artery perfusion  - Instruct patient to report change in severity of symptoms  Outcome: Progressing  Goal: Absence of cardiac dysrhythmias or at baseline rhythm  Description: INTERVENTIONS:  - Continuous cardiac monitoring, vital signs, obtain 12 lead EKG if ordered  - Administer antiarrhythmic and heart rate control medications as ordered  - Monitor electrolytes and administer replacement therapy as ordered  Outcome: Progressing     Problem: RESPIRATORY - ADULT  Goal: Achieves optimal ventilation and oxygenation  Description: INTERVENTIONS:  - Assess for changes in respiratory status  - Assess for changes in mentation and behavior  - Position to facilitate oxygenation and minimize respiratory effort  - Oxygen administered by appropriate delivery if ordered  - Initiate smoking cessation education as indicated  - Encourage broncho-pulmonary hygiene including cough, deep breathe, Incentive Spirometry  - Assess the need for suctioning and aspirate as needed  - Assess and instruct to report SOB or any respiratory difficulty  - Respiratory Therapy support as indicated  Outcome: Progressing     Problem: METABOLIC, FLUID AND ELECTROLYTES - ADULT  Goal: Electrolytes maintained within normal limits  Description: INTERVENTIONS:  - Monitor labs and assess patient for signs and symptoms of electrolyte imbalances  - Administer electrolyte replacement as ordered  - Monitor response to electrolyte replacements, including repeat lab results as appropriate  - Instruct patient on fluid and nutrition as appropriate  Outcome: Progressing  Goal: Fluid balance maintained  Description: INTERVENTIONS:  - Monitor labs   - Monitor I/O and WT  - Instruct patient on fluid and nutrition as appropriate  - Assess for signs & symptoms of volume excess or deficit  Outcome: Progressing  Goal: Glucose maintained within target range  Description: INTERVENTIONS:  - Monitor Blood Glucose as ordered  - Assess for signs and symptoms of hyperglycemia and hypoglycemia  - Administer ordered medications to maintain glucose within target range  - Assess nutritional intake and initiate nutrition service referral as needed  Outcome: Progressing     Problem: SAFETY ADULT  Goal: Patient will remain free of falls  Description: INTERVENTIONS:  - Assess patient frequently for physical needs  -  Identify cognitive and physical deficits and behaviors that affect risk of falls    -  [place] fall precautions as indicated by assessment   - Educate patient/family on patient safety including physical limitations  - Instruct patient to call for assistance with activity based on assessment  - Modify environment to reduce risk of injury  - Consider OT/PT consult to assist with strengthening/mobility  Outcome: Progressing  Goal: Maintain or return to baseline ADL function  Description: INTERVENTIONS:  -  Assess patient's ability to carry out ADLs; assess patient's baseline for ADL function and identify physical deficits which impact ability to perform ADLs (bathing, care of mouth/teeth, toileting, grooming, dressing, etc )  - Assess/evaluate cause of self-care deficits   - Assess range of motion  - Assess patient's mobility; develop plan if impaired  - Assess patient's need for assistive devices and provide as appropriate  - Encourage maximum independence but intervene and supervise when necessary  - Involve family in performance of ADLs  - Assess for home care needs following discharge   - Consider OT consult to assist with ADL evaluation and planning for discharge  - Provide patient education as appropriate  Outcome: Progressing  Goal: Maintain or return mobility status to optimal level  Description: INTERVENTIONS:  - Assess patient's baseline mobility status (ambulation, transfers, stairs, etc )    - Identify cognitive and physical deficits and behaviors that affect mobility  - Identify mobility aids required to assist with transfers and/or ambulation (gait belt, sit-to-stand, lift, walker, cane, etc )  - [place] fall precautions as indicated by assessment  - Record patient progress and toleration of activity level on Mobility SBAR; progress patient to next Phase/Stage  - Instruct patient to call for assistance with activity based on assessment  - Consider rehabilitation consult to assist with strengthening/weightbearing, etc   Outcome: Progressing     Problem: DISCHARGE PLANNING  Goal: Discharge to home or other facility with appropriate resources  Description: INTERVENTIONS:  - Identify barriers to discharge w/patient and caregiver  - Arrange for needed discharge resources and transportation as appropriate  - Identify discharge learning needs (meds, wound care, etc )  - Refer to Case Management Department for coordinating discharge planning if the patient needs post-hospital services based on physician/advanced practitioner order or complex needs related to functional status, cognitive ability, or social support system  Outcome: Progressing     Problem: Knowledge Deficit  Goal: Patient/family/caregiver demonstrates understanding of disease process, treatment plan, medications, and discharge instructions  Description: Complete learning assessment and assess knowledge base  Interventions:  - Provide teaching at level of understanding  - Provide teaching via preferred learning methods  Outcome: Progressing     Problem: Nutrition/Hydration-ADULT  Goal: Nutrient/Hydration intake appropriate for improving, restoring or maintaining nutritional needs  Description: Monitor and assess patient's nutrition/hydration status for malnutrition  Collaborate with interdisciplinary team and initiate plan and interventions as ordered  Monitor patient's weight and dietary intake as ordered or per policy  Utilize nutrition screening tool and intervene as necessary  Determine patient's food preferences and provide high-protein, high-caloric foods as appropriate       INTERVENTIONS:  - Monitor oral intake, urinary output, labs, and treatment plans  - Assess nutrition and hydration status and recommend course of action  - Evaluate amount of meals eaten  - Assist patient with eating if necessary   - Allow adequate time for meals  - Recommend/ encourage appropriate diets, oral nutritional supplements, and vitamin/mineral supplements  - Order, calculate, and assess calorie counts as needed  - Recommend, monitor, and adjust tube feedings and TPN/PPN based on assessed needs  - Assess need for intravenous fluids  - Provide specific nutrition/hydration education as appropriate  - Include patient/family/caregiver in decisions related to nutrition  Outcome: Progressing

## 2021-02-25 NOTE — PLAN OF CARE
Problem: PHYSICAL THERAPY ADULT  Goal: Performs mobility at highest level of function for planned discharge setting  See evaluation for individualized goals  Description: Treatment/Interventions: LE strengthening/ROM, Functional transfer training, Elevations, Therapeutic exercise, Endurance training, Gait training, Bed mobility          See flowsheet documentation for full assessment, interventions and recommendations  Outcome: Progressing  Note: Prognosis: Good  Problem List: Decreased strength, Decreased endurance, Impaired balance, Decreased mobility, Obesity  Assessment: Pt  seen for PT treatment session this date with interventions consisting of bed mobility, transfers and  gait training w/ emphasis on improving pt's ability to ambulate  Pt  Currently performing  tx and ambulation at ( SUP) x 1 level of function  Utilizing RW with fair balance and stability  The patient's AM-PAC Basic Mobility Inpatient Short Form Raw Score is 23, Standardized Score is 50  88  A standardized score greater than 42 9 suggests the patient may benefit from discharge to home  Please also refer to physical therapy recommendation for safe DC planning  In comparison to previous session, Pt  With improvements in activity tolerance  Slowly increasing distance  Pt is in need of continued activity in PT to improve strength balance endurance mobility transfers and ambulation with return to maximize LOF  From PT/mobility standpoint, recommendation at time of d/c would be home PT  in order to promote return to PLOF and independence  PT Discharge Recommendation: Home with skilled therapy          See flowsheet documentation for full assessment

## 2021-02-25 NOTE — ASSESSMENT & PLAN NOTE
Wt Readings from Last 3 Encounters:   02/25/21 (!) 165 kg (363 lb 1 6 oz)   01/13/21 (!) 159 kg (351 lb 6 4 oz)   01/12/21 (!) 161 kg (356 lb)     · Approximately, a 30 lbs  weight gain over the last few months  · Troponin levels were within normal limits x 3 sets  · Transitioned to Lasix gtt @ 10 mg/hr, titrate as needed  · Continue coreg 6 25 mg PO BID  · The patient was seen in consultation by Cardiology    · Monitor the patient's renal function closely while receiving Lasix gtt treatment  · Daily weights  · Strict intake/output measurements  · PT/OT

## 2021-02-25 NOTE — ASSESSMENT & PLAN NOTE
· Initiate cholecalciferol 2000 I  U  PO Qdaily  · Recheck a vitamin D 25-OH level in 1-2 months with his PCP    Results for Milderd Quale (MRN [id number]) as of 2/24/2021 12:28   Ref   Range 2/23/2021 05:26   Vit D, 25-Hydroxy Latest Ref Range: 30 0 - 100 0 ng/mL 13 1 (L)

## 2021-02-25 NOTE — PROGRESS NOTES
Cardiology Progress Note - Jorge A Villavicencio 57 y o  male MRN: [id number]    Unit/Bed#: 410-01 Encounter: [id number]    Assessment/Plan:  1  Acute on chronic diastolic congestive heart failure              - no change in weight since yesterday although patient reports weight was taken prior to urinating this morning   - still with significant volume overload on exam, patient remains 30-40 lbs above dry weight   - will transition to a lasix gtt @ 10 mg/hr, will titrate as needed   - continue daily weights, low sodium diet, fluid restriction, strict I/O   - monitor daily BMP     2  Chronic atrial fibrillation              - remains in atrial fibrillation with controlled rates on telemetry               - continue carvedilol and digoxin              - on anticoagulation with coumadin for goal INR 2-3              - INR today 2 39     3  Hypertension               - currently well controlled     4  Obstructive sleep apnea              - reports compliance with CPAP    Subjective:   Patient seen and examined  States shortness of breath has improved since thoracentesis  Still with significant edema  Denies chest pain  Review of Systems   Constitution: Negative for chills and fever  HENT: Negative  Cardiovascular: Positive for dyspnea on exertion and leg swelling  Negative for chest pain, near-syncope, orthopnea, palpitations, paroxysmal nocturnal dyspnea and syncope  Respiratory: Negative for cough and shortness of breath  Gastrointestinal: Positive for bloating and constipation  Negative for diarrhea, nausea and vomiting  Genitourinary: Negative  Neurological: Negative for dizziness and light-headedness  All other systems reviewed and are negative  Objective:   Vitals: Blood pressure 117/81, pulse 65, temperature 98 1 °F (36 7 °C), resp  rate 18, height 5' 11" (1 803 m), weight (!) 165 kg (363 lb 1 6 oz), SpO2 99 %  , Body mass index is 50 64 kg/m² ,   Orthostatic Blood Pressures      Most Recent Value   Blood Pressure  117/81 filed at 02/25/2021 2687   Patient Position - Orthostatic VS  Sitting filed at 02/25/2021 9664         Systolic (28EBV), SBD:356 , Min:117 , BPN:661     Diastolic (36SDM), LKM:94, Min:61, Max:81      Intake/Output Summary (Last 24 hours) at 2/25/2021 1006  Last data filed at 2/25/2021 0511  Gross per 24 hour   Intake 480 ml   Output 1300 ml   Net -820 ml     Weight (last 2 days)     Date/Time   Weight    02/25/21 0534   (!) 165 (363 1)    02/24/21 0600   (!) 165 (363 54)    02/23/21 0600   (!) 167 (367 95)                Telemetry Review: atrial fibrillation, PVC's  EKG personally reviewed by Daisy Taylor PA-C  Physical Exam  Vitals signs reviewed  Constitutional:       General: He is not in acute distress  Appearance: He is well-developed  He is obese  He is not diaphoretic  Comments: Morbidly obese male in no acute distress   HENT:      Head: Normocephalic and atraumatic  Eyes:      Pupils: Pupils are equal, round, and reactive to light  Neck:      Musculoskeletal: Normal range of motion  Vascular: No carotid bruit  Cardiovascular:      Rate and Rhythm: Normal rate  Rhythm irregularly irregular  Pulses:           Radial pulses are 2+ on the right side and 2+ on the left side  Heart sounds: S1 normal and S2 normal  No murmur  Pulmonary:      Effort: Pulmonary effort is normal  No respiratory distress  Breath sounds: Examination of the right-middle field reveals rales  Examination of the right-lower field reveals decreased breath sounds  Examination of the left-lower field reveals decreased breath sounds  Decreased breath sounds and rales present  No wheezing  Abdominal:      General: There is distension  Palpations: Abdomen is soft  Tenderness: There is no abdominal tenderness  Musculoskeletal: Normal range of motion  Right lower leg: Edema (+2/+3 bilateral lower extremity edema) present  Left lower leg: Edema present  Skin:     General: Skin is warm and dry  Findings: No erythema  Neurological:      General: No focal deficit present  Mental Status: He is alert and oriented to person, place, and time  Psychiatric:         Mood and Affect: Mood normal          Behavior: Behavior normal            Laboratory Results:  Results from last 7 days   Lab Units 02/24/21 0637 02/23/21  0527 02/23/21  0525 02/22/21  0945   CK TOTAL U/L  --  44  --   --    TROPONIN I ng/mL 0 03  --  0 03 0 03       CBC with diff:   Results from last 7 days   Lab Units 02/25/21 0528 02/24/21  0637 02/23/21  0814 02/22/21  0945   WBC Thousand/uL 5 97 6 44 6 26 5 81   HEMOGLOBIN g/dL 9 3* 9 5* 9 0* 9 7*   HEMATOCRIT % 31 6* 31 9* 29 5* 32 1*   MCV fL 92 91 90 90   PLATELETS Thousands/uL 161 175 169 198   MCH pg 27 0 27 1 27 5 27 1   MCHC g/dL 29 4* 29 8* 30 5* 30 2*   RDW % 17 1* 17 0* 17 0* 17 0*   MPV fL 10 4 10 7 10 5 10 5   NRBC AUTO /100 WBCs 0 0 0 0         CMP:  Results from last 7 days   Lab Units 02/25/21 0528 02/24/21 0637 02/23/21  0527 02/22/21  0945   POTASSIUM mmol/L 4 0 3 6 4 1 3 7   CHLORIDE mmol/L 98* 98* 97* 97*   CO2 mmol/L 32 35* 31 33*   BUN mg/dL 21 21 20 18   CREATININE mg/dL 1 55* 1 50* 1 28 1 30   CALCIUM mg/dL 8 5 8 6 8 5 8 6   AST U/L 20 20 30 21   ALT U/L 17 20 16 17   ALK PHOS U/L 126* 121* 123* 126*   EGFR ml/min/1 73sq m 47 49 60 58         BMP:  Results from last 7 days   Lab Units 02/25/21 0528 02/24/21 0637 02/23/21  0527 02/22/21  0945   POTASSIUM mmol/L 4 0 3 6 4 1 3 7   CHLORIDE mmol/L 98* 98* 97* 97*   CO2 mmol/L 32 35* 31 33*   BUN mg/dL 21 21 20 18   CREATININE mg/dL 1 55* 1 50* 1 28 1 30   CALCIUM mg/dL 8 5 8 6 8 5 8 6       BNP: No results for input(s): BNP in the last 72 hours      Magnesium:   Results from last 7 days   Lab Units 02/25/21 0528 02/24/21  0637 02/23/21  0527 02/22/21  0945   MAGNESIUM mg/dL 2 1 2 1 2 2 2 1       Coags:   Results from last 7 days   Lab Units 02/25/21  0528 21  4160 21  0814 21  0945   PTT seconds  --   --   --  51*   INR  2 39* 2 40* 2 56* 2 37*       TSH:        Hemoglobin A1C       Lipid Profile:       Cardiac testing:   Results for orders placed during the hospital encounter of 21   Echo complete with contrast if indicated    Narrative [address]  [address], [place] [address]  [phone]    Transthoracic Echocardiogram  2D, M-mode, Doppler, and Color Doppler    Study date:  2021    Patient: River Mann  MR number: [id number]  Account number: [de-identified]  : 1960  Age: 57 years  Gender: Male  Status: Inpatient  Location: Bedside  Height: 71 in  Weight: 350 lb  BP: 130/ 80 mmHg    Indications: shortness of breath    Diagnoses: 428 0 - CONGESTIVE HEART FAILURE    Sonographer:  Uriel 61, CCT  Primary Physician:  Jac Wells DO  Referring Physician:  Osmin Cam DO  Group:  [facility]  Interpreting Physician:  Naeem Ingram DO    SUMMARY    LEFT VENTRICLE:  Systolic function was normal  Ejection fraction was estimated to be 60 %  There were no regional wall motion abnormalities  Wall thickness was mildly increased  RIGHT VENTRICLE:  The ventricle was moderately dilated  Systolic function was mildly reduced  LEFT ATRIUM:  The atrium was mildly dilated  RIGHT ATRIUM:  The atrium was moderately dilated  MITRAL VALVE:  There was mild regurgitation  TRICUSPID VALVE:  There was moderate regurgitation  Estimated peak PA pressure was 52 mmHg  HISTORY: PRIOR HISTORY: CHF, morbid obesity    PROCEDURE: The procedure was performed at the bedside  This was a routine study  The transthoracic approach was used  The study included complete 2D imaging, M-mode, complete spectral Doppler, and color Doppler  The heart rate was 80 bpm,  at the start of the study   Echocardiographic views were limited due to poor patient compliance and poor acoustic window availability  This was a technically difficult study  LEFT VENTRICLE: Size was normal  Systolic function was normal  Ejection fraction was estimated to be 60 %  There were no regional wall motion abnormalities  Wall thickness was mildly increased  DOPPLER: The study was not technically  sufficient to allow evaluation of LV diastolic function  RIGHT VENTRICLE: The ventricle was moderately dilated  Systolic function was mildly reduced  LEFT ATRIUM: The atrium was mildly dilated  RIGHT ATRIUM: The atrium was moderately dilated  MITRAL VALVE: Valve structure was normal  There was normal leaflet separation  DOPPLER: The transmitral velocity was within the normal range  There was no evidence for stenosis  There was mild regurgitation  AORTIC VALVE: The valve was trileaflet  Leaflets exhibited normal thickness and normal cuspal separation  DOPPLER: Transaortic velocity was within the normal range  There was no evidence for stenosis  There was no regurgitation  TRICUSPID VALVE: The valve structure was normal  There was normal leaflet separation  DOPPLER: The transtricuspid velocity was within the normal range  There was no evidence for stenosis  There was moderate regurgitation  Estimated peak PA  pressure was 52 mmHg  PULMONIC VALVE: Leaflets exhibited normal thickness, no calcification, and normal cuspal separation  DOPPLER: The transpulmonic velocity was within the normal range  There was no regurgitation  PERICARDIUM: There was no pericardial effusion  The pericardium was normal in appearance  AORTA: The root exhibited normal size  SYSTEMIC VEINS: IVC: The inferior vena cava was not well visualized      SYSTEM MEASUREMENT TABLES    2D  %FS: 31 45 %  Ao Diam: 3 22 cm  EDV(Teich): 148 86 ml  EF(Teich): 58 74 %  ESV(Teich): 61 42 ml  IVSd: 1 51 cm  LA Area: 20 63 cm2  LA Diam: 5 65 cm  LVIDd: 5 52 cm  LVIDs: 3 79 cm  LVPWd: 1 08 cm  RA Area: 33 86 cm2  RWT: 0  39  SV(Teich): 87 44 ml    CW  RAP: 0 mmHg  TR Vmax: 3 47 m/s  TR maxP 33 mmHg    PW  E' Sept: 0 09 m/s  E/E' Sept: 13 35  MV A Ti: 0 48 m/s  MV Dec [place]: 4 5 m/s2  MV DecT: 253 15 ms  MV E Ti: 1 14 m/s  MV E/A Ratio: 2 36  RVSP: 48 33 mmHg    [address] Accredited [facility]ch[facility]ography [facility]    Prepared and electronically signed by    Alejandro Blount,   Signed 2021 08:06:00       No results found for this or any previous visit  No results found for this or any previous visit  No results found for this or any previous visit      Meds/Allergies   all current active meds have been reviewed  Medications Prior to Admission   Medication    carvedilol (COREG) 6 25 mg tablet    digoxin (LANOXIN) 0 25 mg tablet    furosemide (LASIX) 40 mg tablet    metFORMIN (GLUCOPHAGE) 1000 MG tablet    metolazone (ZAROXOLYN) 5 mg tablet    omeprazole (PriLOSEC) 40 MG capsule    warfarin (COUMADIN) 5 mg tablet    zolpidem (AMBIEN) 10 mg tablet       furosemide, 10 mg/hr      Assessment:  Principal Problem:    Acute on chronic diastolic CHF (congestive heart failure) (HCC)  Active Problems:    STEFF (obstructive sleep apnea)    Pleural effusion on right    Cirrhosis of liver with ascites (HCC)    CKD (chronic kidney disease) stage 3, GFR 30-59 ml/min    Lactic acidosis    Permanent atrial fibrillation (HCC)    Diverticulosis of colon    Lesion of spleen    Type 2 diabetes mellitus with hyperglycemia, without long-term current use of insulin (HCC)    Hypoxia    Tobacco use    Hyponatremia    Constipation    Pulmonary hypertension (HCC)    Vitamin D deficiency    Atelectasis

## 2021-02-26 NOTE — ASSESSMENT & PLAN NOTE
· Likely hypervolemic hyponatremia  · Resolved with IV lasix treatment  · Initiated Lasix gtt @ 10 mg/hr  · Follow the sodium level    Results from last 7 days   Lab Units 02/26/21  0558 02/25/21  0528 02/24/21  0637   SODIUM mmol/L 139 137 136   POTASSIUM mmol/L 3 5 4 0 3 6   CHLORIDE mmol/L 98* 98* 98*   CO2 mmol/L 33* 32 35*   BUN mg/dL 24 21 21   CREATININE mg/dL 1 62* 1 55* 1 50*   CALCIUM mg/dL 8 6 8 5 8 6

## 2021-02-26 NOTE — ASSESSMENT & PLAN NOTE
Lab Results   Component Value Date    HGBA1C 6 1 (H) 01/05/2021       Recent Labs     02/25/21  1056 02/25/21  1550 02/25/21  2115 02/26/21  0741   POCGLU 125 124 130 169*       Blood Sugar Average: Last 72 hrs:  (P) 132   · Hold metformin in the setting of a lactic acidosis  · Consider the initiation of an ACE-inhibitor for renal protection in the setting of type 2 diabetes mellitus if his renal function remains stable  · Insulin sliding scale with blood glucose monitoring ACHS

## 2021-02-26 NOTE — PROGRESS NOTES
Cardiology Progress Note - John Zeng 57 y o  male MRN: [id number]    Unit/Bed#: 410-01 Encounter: [id number]      Assessment/Plan:  1  Acute on chronic diastolic congestive heart failure              - transitioned to lasix gtt yesterday with good urinary output   - weight down 4 lbs to 359 lbs today - still 30-40 lbs above dry weight  - would continue lasix gtt at current dose, can give metolazone over the weekend if urinary output decreases   - continue daily weights, low sodium diet, fluid restriction, strict I/O   - monitor daily BMP - expect renal function to improve/stabilize with effective diuresis    2  Chronic atrial fibrillation              - remains in atrial fibrillation with controlled rates on telemetry               - continue carvedilol and digoxin              - on anticoagulation with coumadin for goal INR 2-3 - INR today 2 32     3  Hypertension               - well controlled on current regimen     4  Obstructive sleep apnea              - reports compliance with CPAP    Subjective:   Patient seen and examined  Denies any acute complaints  Transitioned to lasix gtt yesterday with good urinary output  Review of Systems   Constitution: Negative for chills and fever  HENT: Negative  Cardiovascular: Positive for dyspnea on exertion and leg swelling  Negative for chest pain, near-syncope, orthopnea, palpitations, paroxysmal nocturnal dyspnea and syncope  Respiratory: Negative for cough and shortness of breath  Gastrointestinal: Positive for bloating  Negative for diarrhea, nausea and vomiting  Genitourinary: Negative  Neurological: Negative for dizziness and light-headedness  All other systems reviewed and are negative  Objective:   Vitals: Blood pressure 116/68, pulse 60, temperature 97 8 °F (36 6 °C), resp  rate 17, height 5' 11" (1 803 m), weight (!) 163 kg (359 lb 12 7 oz), SpO2 99 %  , Body mass index is 50 18 kg/m² ,   Orthostatic Blood Pressures      Most Recent Value   Blood Pressure  116/68 filed at 02/26/2021 4095   Patient Position - Orthostatic VS  Sitting filed at 02/25/2021 6973         Systolic (28APZ), ISR:111 , Min:116 , UOS:778     Diastolic (09KQR), CDF:23, Min:68, Max:70      Intake/Output Summary (Last 24 hours) at 2/26/2021 1059  Last data filed at 2/26/2021 0618  Gross per 24 hour   Intake 518 18 ml   Output 3100 ml   Net -2581 82 ml     Weight (last 2 days)     Date/Time   Weight    02/26/21 0600   (!) 163 (359 79)    02/25/21 0534   (!) 165 (363 1)    02/24/21 0600   (!) 165 (363 54)                Telemetry Review: atrial fibrillation with controlled rates, PVC's  EKG personally reviewed by Lord Meet PA-C  Physical Exam  Vitals signs reviewed  Constitutional:       General: He is not in acute distress  Appearance: He is well-developed  He is obese  He is not diaphoretic  Comments: Morbidly obese male in no acute distress   HENT:      Head: Normocephalic and atraumatic  Eyes:      Pupils: Pupils are equal, round, and reactive to light  Neck:      Musculoskeletal: Normal range of motion  Vascular: JVD present  No carotid bruit  Cardiovascular:      Rate and Rhythm: Normal rate and regular rhythm  Pulses:           Radial pulses are 2+ on the right side and 2+ on the left side  Heart sounds: S1 normal and S2 normal  No murmur  Pulmonary:      Effort: Pulmonary effort is normal  No respiratory distress  Breath sounds: Decreased breath sounds present  No wheezing or rales  Abdominal:      General: There is distension  Palpations: Abdomen is soft  Tenderness: There is no abdominal tenderness  Musculoskeletal: Normal range of motion  Right lower leg: Edema (+3 bilateral lower extremity edema) present  Left lower leg: Edema present  Skin:     General: Skin is warm and dry  Findings: No erythema  Neurological:      General: No focal deficit present        Mental Status: He is alert and oriented to person, place, and time  Psychiatric:         Mood and Affect: Mood normal          Behavior: Behavior normal            Laboratory Results:  Results from last 7 days   Lab Units 02/24/21  0637 02/23/21  0527 02/23/21  0525 02/22/21  0945   CK TOTAL U/L  --  44  --   --    TROPONIN I ng/mL 0 03  --  0 03 0 03       CBC with diff:   Results from last 7 days   Lab Units 02/26/21  0558 02/25/21  0528 02/24/21  0637 02/23/21  0814 02/22/21  0945   WBC Thousand/uL 5 79 5 97 6 44 6 26 5 81   HEMOGLOBIN g/dL 9 1* 9 3* 9 5* 9 0* 9 7*   HEMATOCRIT % 31 3* 31 6* 31 9* 29 5* 32 1*   MCV fL 92 92 91 90 90   PLATELETS Thousands/uL 173 161 175 169 198   MCH pg 26 8 27 0 27 1 27 5 27 1   MCHC g/dL 29 1* 29 4* 29 8* 30 5* 30 2*   RDW % 17 1* 17 1* 17 0* 17 0* 17 0*   MPV fL 11 1 10 4 10 7 10 5 10 5   NRBC AUTO /100 WBCs 0 0 0 0 0         CMP:  Results from last 7 days   Lab Units 02/26/21  0558 02/25/21  0528 02/24/21 0637 02/23/21  0527 02/22/21  0945   POTASSIUM mmol/L 3 5 4 0 3 6 4 1 3 7   CHLORIDE mmol/L 98* 98* 98* 97* 97*   CO2 mmol/L 33* 32 35* 31 33*   BUN mg/dL 24 21 21 20 18   CREATININE mg/dL 1 62* 1 55* 1 50* 1 28 1 30   CALCIUM mg/dL 8 6 8 5 8 6 8 5 8 6   AST U/L 19 20 20 30 21   ALT U/L 20 17 20 16 17   ALK PHOS U/L 127* 126* 121* 123* 126*   EGFR ml/min/1 73sq m 45 47 49 60 58         BMP:  Results from last 7 days   Lab Units 02/26/21  0558 02/25/21  0528 02/24/21  0637 02/23/21  0527 02/22/21  0945   POTASSIUM mmol/L 3 5 4 0 3 6 4 1 3 7   CHLORIDE mmol/L 98* 98* 98* 97* 97*   CO2 mmol/L 33* 32 35* 31 33*   BUN mg/dL 24 21 21 20 18   CREATININE mg/dL 1 62* 1 55* 1 50* 1 28 1 30   CALCIUM mg/dL 8 6 8 5 8 6 8 5 8 6       BNP: No results for input(s): BNP in the last 72 hours      Magnesium:   Results from last 7 days   Lab Units 02/26/21  0558 02/25/21  0528 02/24/21  0637 02/23/21  0527 02/22/21  0945   MAGNESIUM mg/dL 2 3 2 1 2 1 2 2 2 1       Coags:   Results from last 7 days   Lab Units 21  0558 21  0528 21  4558 21  0814 21  0945   PTT seconds  --   --   --   --  51*   INR  2 32* 2 39* 2 40* 2 56* 2 37*       TSH:        Hemoglobin A1C       Lipid Profile:       Cardiac testing:   Results for orders placed during the hospital encounter of 21   Echo complete with contrast if indicated    Narrative [address]  [address], [place] [address]  [phone]    Transthoracic Echocardiogram  2D, M-mode, Doppler, and Color Doppler    Study date:  2021    Patient: Enoc Zelaya  MR number: [id number]  Account number: [de-identified]  : 1960  Age: 57 years  Gender: Male  Status: Inpatient  Location: Bedside  Height: 71 in  Weight: 350 lb  BP: 130/ 80 mmHg    Indications: shortness of breath    Diagnoses: 428 0 - CONGESTIVE HEART FAILURE    Sonographer:  Uriel 61, CCT  Primary Physician:  Emily Jesus DO  Referring Physician:  Smitha Rivera DO  Group:  [facility]  Interpreting Physician:  Breanna Sanchez DO    SUMMARY    LEFT VENTRICLE:  Systolic function was normal  Ejection fraction was estimated to be 60 %  There were no regional wall motion abnormalities  Wall thickness was mildly increased  RIGHT VENTRICLE:  The ventricle was moderately dilated  Systolic function was mildly reduced  LEFT ATRIUM:  The atrium was mildly dilated  RIGHT ATRIUM:  The atrium was moderately dilated  MITRAL VALVE:  There was mild regurgitation  TRICUSPID VALVE:  There was moderate regurgitation  Estimated peak PA pressure was 52 mmHg  HISTORY: PRIOR HISTORY: CHF, morbid obesity    PROCEDURE: The procedure was performed at the bedside  This was a routine study  The transthoracic approach was used  The study included complete 2D imaging, M-mode, complete spectral Doppler, and color Doppler  The heart rate was 80 bpm,  at the start of the study   Echocardiographic views were limited due to poor patient compliance and poor acoustic window availability  This was a technically difficult study  LEFT VENTRICLE: Size was normal  Systolic function was normal  Ejection fraction was estimated to be 60 %  There were no regional wall motion abnormalities  Wall thickness was mildly increased  DOPPLER: The study was not technically  sufficient to allow evaluation of LV diastolic function  RIGHT VENTRICLE: The ventricle was moderately dilated  Systolic function was mildly reduced  LEFT ATRIUM: The atrium was mildly dilated  RIGHT ATRIUM: The atrium was moderately dilated  MITRAL VALVE: Valve structure was normal  There was normal leaflet separation  DOPPLER: The transmitral velocity was within the normal range  There was no evidence for stenosis  There was mild regurgitation  AORTIC VALVE: The valve was trileaflet  Leaflets exhibited normal thickness and normal cuspal separation  DOPPLER: Transaortic velocity was within the normal range  There was no evidence for stenosis  There was no regurgitation  TRICUSPID VALVE: The valve structure was normal  There was normal leaflet separation  DOPPLER: The transtricuspid velocity was within the normal range  There was no evidence for stenosis  There was moderate regurgitation  Estimated peak PA  pressure was 52 mmHg  PULMONIC VALVE: Leaflets exhibited normal thickness, no calcification, and normal cuspal separation  DOPPLER: The transpulmonic velocity was within the normal range  There was no regurgitation  PERICARDIUM: There was no pericardial effusion  The pericardium was normal in appearance  AORTA: The root exhibited normal size  SYSTEMIC VEINS: IVC: The inferior vena cava was not well visualized      SYSTEM MEASUREMENT TABLES    2D  %FS: 31 45 %  Ao Diam: 3 22 cm  EDV(Teich): 148 86 ml  EF(Teich): 58 74 %  ESV(Teich): 61 42 ml  IVSd: 1 51 cm  LA Area: 20 63 cm2  LA Diam: 5 65 cm  LVIDd: 5 52 cm  LVIDs: 3 79 cm  LVPWd: 1 08 cm  RA Area: 33 86 cm2  RWT: 0 39  SV(Teich): 87 44 ml    CW  RAP: 0 mmHg  TR Vmax: 3 47 m/s  TR maxP 33 mmHg    PW  E' Sept: 0 09 m/s  E/E' Sept: 13 35  MV A Ti: 0 48 m/s  MV Dec [place]: 4 5 m/s2  MV DecT: 253 15 ms  MV E Ti: 1 14 m/s  MV E/A Ratio: 2 36  RVSP: 48 33 mmHg    [facility]    Prepared and electronically signed by    Tigre Petty DO  Signed 2021 08:06:00       No results found for this or any previous visit  No results found for this or any previous visit  No results found for this or any previous visit      Meds/Allergies   all current active meds have been reviewed  Medications Prior to Admission   Medication    carvedilol (COREG) 6 25 mg tablet    digoxin (LANOXIN) 0 25 mg tablet    furosemide (LASIX) 40 mg tablet    metFORMIN (GLUCOPHAGE) 1000 MG tablet    metolazone (ZAROXOLYN) 5 mg tablet    omeprazole (PriLOSEC) 40 MG capsule    warfarin (COUMADIN) 5 mg tablet    zolpidem (AMBIEN) 10 mg tablet       furosemide, 10 mg/hr, Last Rate: 10 mg/hr (21 1207)      Assessment:  Principal Problem:    Acute on chronic diastolic CHF (congestive heart failure) (HCC)  Active Problems:    STEFF (obstructive sleep apnea)    Pleural effusion on right    Cirrhosis of liver with ascites (HCC)    CKD (chronic kidney disease) stage 3, GFR 30-59 ml/min    Lactic acidosis    Permanent atrial fibrillation (HCC)    Diverticulosis of colon    Lesion of spleen    Type 2 diabetes mellitus with hyperglycemia, without long-term current use of insulin (HCC)    Hypoxia    Tobacco use    Hyponatremia    Constipation    Pulmonary hypertension (HCC)    Vitamin D deficiency    Atelectasis

## 2021-02-26 NOTE — PROGRESS NOTES
Progress Note - Nazario Cos 1960, 57 y o  male MRN: [id number]    Unit/Bed#: 410-01 Encounter: [id number]    Primary Care Provider: Emily Jesus DO   Date and time admitted to hospital: 2/22/2021  9:27 AM        * Acute on chronic diastolic CHF (congestive heart failure) ([facility] [address] )  Assessment & Plan  Wt Readings from Last 3 Encounters:   02/26/21 (!) 163 kg (359 lb 12 7 oz)   01/13/21 (!) 159 kg (351 lb 6 4 oz)   01/12/21 (!) 161 kg (356 lb)     · Weight trended down from 363 --> 359lb since started on Lasix gtt yesterday  · Troponin levels were within normal limits x 3 sets  · Transitioned to Lasix gtt @ 10 mg/hr, titrate as needed  · Continue coreg 6 25 mg PO BID  · The patient was seen in consultation by Cardiology  Appreciate recommendation  · Monitor the patient's renal function closely while receiving Lasix gtt treatment  · Daily weights  · Strict intake/output measurements  · PT/OT        Atelectasis  Assessment & Plan  · Incentive spirometry 10 times per hour while awake    Vitamin D deficiency  Assessment & Plan  · Initiate cholecalciferol 2000 I  U  PO Qdaily  · Recheck a vitamin D 25-OH level in 1-2 months with his PCP    Results for Ebony Narayan (MRN [id number]) as of 2/24/2021 12:28   Ref   Range 2/23/2021 05:26   Vit D, 25-Hydroxy Latest Ref Range: 30 0 - 100 0 ng/mL 13 1 (L)           Pulmonary hypertension (HCC)  Assessment & Plan  · Outpatient follow-up with Pulmonology    Constipation  Assessment & Plan  · Give miralax 17 grams PO x 1 dose on 02/24/2021  · Initiated Colace 100 mg PO BID and Dulcolax    Hyponatremia  Assessment & Plan  · Likely hypervolemic hyponatremia  · Resolved with IV lasix treatment  · Initiated Lasix gtt @ 10 mg/hr  · Follow the sodium level    Results from last 7 days   Lab Units 02/26/21  0558 02/25/21  0528 02/24/21  0637   SODIUM mmol/L 139 137 136   POTASSIUM mmol/L 3 5 4 0 3 6   CHLORIDE mmol/L 98* 98* 98*   CO2 mmol/L 33* 32 35*   BUN mg/dL 24 21 21 CREATININE mg/dL 1 62* 1 55* 1 50*   CALCIUM mg/dL 8 6 8 5 8 6         Tobacco use  Assessment & Plan  · Nicotine patch  · Smoking cessation counseling    Hypoxia  Assessment & Plan  · Initially required 4 lpm of continuous supplemental oxygen via the nasal cannula to maintain oxygen saturations at 92% and above  · Successfully weaned off supplemental oxygen  · Secondary to right-sided pleural effusion  · Respiratory protocol  · Incentive spirometry 10 times per hour while awake    Type 2 diabetes mellitus with hyperglycemia, without long-term current use of insulin [facility])  Assessment & Plan  Lab Results   Component Value Date    HGBA1C 6 1 (H) 01/05/2021       Recent Labs     02/25/21  1056 02/25/21  1550 02/25/21  2115 02/26/21  0741   POCGLU 125 124 130 169*       Blood Sugar Average: Last 72 hrs:  (P) 132   · Hold metformin in the setting of a lactic acidosis  · Consider the initiation of an ACE-inhibitor for renal protection in the setting of type 2 diabetes mellitus if his renal function remains stable  · Insulin sliding scale with blood glucose monitoring ACHS    Lesion of spleen  Assessment & Plan  · Outpatient surveillance imaging with his PCP    Diverticulosis of colon  Assessment & Plan  · Outpatient follow-up with Gastroenterology    Permanent atrial fibrillation ([facility] [address] )  Assessment & Plan  · Continue PO coreg and PO digoxin  · Continue anticoagulation with PO coumadin for a goal INR of 2-3  · Outpatient follow-up with Cardiology    Results for Ronan Martinez (MRN [id number]) as of 2/24/2021 12:39   Ref   Range 2/23/2021 05:27   DIGOXIN LEVEL Latest Ref Range: 0 8 - 2 0 ng/mL 1 7       Lactic acidosis  Assessment & Plan  · Likely secondary to chronic metformin use  · Resolved with thiamine 200 mg IV x 1 dose on 02/22/2021    CKD (chronic kidney disease) stage 3, GFR 30-59 ml/min  Assessment & Plan  Lab Results   Component Value Date    EGFR 45 02/26/2021    EGFR 47 02/25/2021    EGFR 49 02/24/2021 CREATININE 1 62 (H) 02/26/2021    CREATININE 1 55 (H) 02/25/2021    CREATININE 1 50 (H) 02/24/2021     · Baseline serum creatinine of 1 3-1 6 mg/dl  · Avoid all nephrotoxic agents  · Serial laboratory testing to monitor the patient's renal function and electrolytes    Cirrhosis of liver with ascites [facility])  Assessment & Plan  · The patient was seen in consultation by Gastroenterology  · Avoid all hepatotoxic agents  · Outpatient hepatocellular carcinoma surveillance and follow-up with Gastroenterology    US liver doppler only  Status: Final result   PACS Images     Show images for US liver doppler only   Study Result    ABDOMEN ULTRASOUND, COMPLETE WITH DOPPLER     INDICATION:    Cirrhosis with ascites        COMPARISON:  Compared with 10/2/2020     TECHNIQUE:   Real-time ultrasound of the abdomen was performed with a curvilinear transducer with Doppler evaluation of liver      FINDINGS:     The main portal vein and primary branch segments are patent and hepatopetal with normal spectral waveform  Peak systolic velocity at 32 cm/s  The right at 42 cm/s and the left at 59 cm/s      Hepatic veins are patent  Spectral waveforms within normal limits  Right hepatic vein at 57 cm/s, mid hepatic vein at 56 cm/s and left hepatic vein at 77 cm/s        Main hepatic artery appears normal size, patent with normal spectral waveform      Splenic vein is patent  56 cm/s      IMPRESSION:     Patent portal vein and hepatic veins  Pleural effusion on right  Assessment & Plan  · Recurrent right-sided pleural effusion  · Transitioned to Lasix gtt @ 10 mg/hr, will titrate as needed  · Successful right-sided thoracentesis by [facility], which yielded 1600 ml   · Appears to be a transudative pleural effusion by fluid analysis  · The patient was seen in consultation by Pulmonology      STEFF (obstructive sleep apnea)  Assessment & Plan  · Continue BIPAP at 17/11 QHS and anytime while sleeping        Subjective: Patient seen and examined at bedside  Patient diuresed with urine output -3 7L over past 24 hours  Weight decreased from 363 to 359 lbs today  VSS  Objective:     Vitals: Blood pressure 116/68, pulse 60, temperature 97 8 °F (36 6 °C), resp  rate 17, height 5' 11" (1 803 m), weight (!) 163 kg (359 lb 12 7 oz), SpO2 99 %  ,Body mass index is 50 18 kg/m²  Wt Readings from Last 3 Encounters:   02/26/21 (!) 163 kg (359 lb 12 7 oz)   01/13/21 (!) 159 kg (351 lb 6 4 oz)   01/12/21 (!) 161 kg (356 lb)       Intake/Output Summary (Last 24 hours) at 2/26/2021 1234  Last data filed at 2/26/2021 0618  Gross per 24 hour   Intake 378 18 ml   Output 2500 ml   Net -2121 82 ml       Physical Exam:   Physical Exam  Vitals signs reviewed  Constitutional:       General: He is not in acute distress  Appearance: He is not ill-appearing  HENT:      Head: Normocephalic  Nose: No congestion or rhinorrhea  Eyes:      General: No scleral icterus  Right eye: No discharge  Left eye: No discharge  Cardiovascular:      Rate and Rhythm: Normal rate  Heart sounds: No murmur  Pulmonary:      Effort: Pulmonary effort is normal  No respiratory distress  Comments: Crackles in right upper and lower lobes  Abdominal:      General: Abdomen is flat  Bowel sounds are normal       Palpations: Abdomen is soft  Comments: Obese abdomen   Musculoskeletal:      Right lower leg: Edema present  Left lower leg: Edema present  Comments: Chronic venous stasis changes in b/l lower shins  Skin:     General: Skin is warm  Neurological:      Mental Status: He is alert and oriented to person, place, and time     Psychiatric:         Mood and Affect: Mood normal          Behavior: Behavior normal          Recent Results (from the past 24 hour(s))   Fingerstick Glucose (POCT)    Collection Time: 02/25/21  3:50 PM   Result Value Ref Range    POC Glucose 124 65 - 140 mg/dl   Fingerstick Glucose (POCT) Collection Time: 02/25/21  9:15 PM   Result Value Ref Range    POC Glucose 130 65 - 140 mg/dl   Comprehensive metabolic panel    Collection Time: 02/26/21  5:58 AM   Result Value Ref Range    Sodium 139 136 - 145 mmol/L    Potassium 3 5 3 5 - 5 3 mmol/L    Chloride 98 (L) 100 - 108 mmol/L    CO2 33 (H) 21 - 32 mmol/L    ANION GAP 8 4 - 13 mmol/L    BUN 24 5 - 25 mg/dL    Creatinine 1 62 (H) 0 60 - 1 30 mg/dL    Glucose 126 65 - 140 mg/dL    Calcium 8 6 8 3 - 10 1 mg/dL    Corrected Calcium 9 7 8 3 - 10 1 mg/dL    AST 19 5 - 45 U/L    ALT 20 12 - 78 U/L    Alkaline Phosphatase 127 (H) 46 - 116 U/L    Total Protein 7 4 6 4 - 8 2 g/dL    Albumin 2 6 (L) 3 5 - 5 0 g/dL    Total Bilirubin 0 80 0 20 - 1 00 mg/dL    eGFR 45 ml/min/1 73sq m   Magnesium    Collection Time: 02/26/21  5:58 AM   Result Value Ref Range    Magnesium 2 3 1 6 - 2 6 mg/dL   Phosphorus    Collection Time: 02/26/21  5:58 AM   Result Value Ref Range    Phosphorus 3 7 2 3 - 4 1 mg/dL   CBC and differential    Collection Time: 02/26/21  5:58 AM   Result Value Ref Range    WBC 5 79 4 31 - 10 16 Thousand/uL    RBC 3 39 (L) 3 88 - 5 62 Million/uL    Hemoglobin 9 1 (L) 12 0 - 17 0 g/dL    Hematocrit 31 3 (L) 36 5 - 49 3 %    MCV 92 82 - 98 fL    MCH 26 8 26 8 - 34 3 pg    MCHC 29 1 (L) 31 4 - 37 4 g/dL    RDW 17 1 (H) 11 6 - 15 1 %    MPV 11 1 8 9 - 12 7 fL    Platelets 321 760 - 476 Thousands/uL    nRBC 0 /100 WBCs    Neutrophils Relative 74 43 - 75 %    Immat GRANS % 1 0 - 2 %    Lymphocytes Relative 11 (L) 14 - 44 %    Monocytes Relative 8 4 - 12 %    Eosinophils Relative 5 0 - 6 %    Basophils Relative 1 0 - 1 %    Neutrophils Absolute 4 33 1 85 - 7 62 Thousands/µL    Immature Grans Absolute 0 03 0 00 - 0 20 Thousand/uL    Lymphocytes Absolute 0 64 0 60 - 4 47 Thousands/µL    Monocytes Absolute 0 46 0 17 - 1 22 Thousand/µL    Eosinophils Absolute 0 28 0 00 - 0 61 Thousand/µL    Basophils Absolute 0 05 0 00 - 0 10 Thousands/µL   Protime-INR    Collection Time: 02/26/21  5:58 AM   Result Value Ref Range    Protime 25 0 (H) 11 6 - 14 5 seconds    INR 2 32 (H) 0 84 - 1 19   Fingerstick Glucose (POCT)    Collection Time: 02/26/21  7:41 AM   Result Value Ref Range    POC Glucose 169 (H) 65 - 140 mg/dl       Current Facility-Administered Medications   Medication Dose Route Frequency Provider Last Rate Last Admin    bisacodyl (DULCOLAX) EC tablet 10 mg  10 mg Oral After Jose Ritchie DO        carvedilol (COREG) tablet 6 25 mg  6 25 mg Oral BID With Meals [facility], DO   6 25 mg at 02/26/21 0809    cholecalciferol (VITAMIN D3) tablet 2,000 Units  2,000 Units Oral Daily [facility], DO   2,000 Units at 02/26/21 0820    digoxin (LANOXIN) tablet 250 mcg  250 mcg Oral Daily [facility], DO   250 mcg at 02/26/21 0820    docusate sodium (COLACE) capsule 100 mg  100 mg Oral BID [facility], DO   100 mg at 02/26/21 0820    furosemide (LASIX) 500 mg infusion 50 mL  10 mg/hr Intravenous Continuous Tia Maury, PA-C 1 mL/hr at 02/25/21 1207 10 mg/hr at 02/25/21 1207    insulin lispro (HumaLOG) 100 units/mL subcutaneous injection 1-6 Units  1-6 Units Subcutaneous HS [facility], DO   1 Units at 02/23/21 2152    insulin lispro (HumaLOG) 100 units/mL subcutaneous injection 2-12 Units  2-12 Units Subcutaneous TID [facility], DO   2 Units at 02/26/21 1148    nicotine (NICODERM CQ) 7 mg/24hr TD 24 hr patch 1 patch  1 patch Transdermal Daily [facility], DO        oxyCODONE (ROXICODONE) IR tablet 5 mg  5 mg Oral Q4H PRN [facility], DO   5 mg at 02/25/21 1757    pantoprazole (PROTONIX) EC tablet 40 mg  40 mg Oral Early Morning [facility], DO   40 mg at 02/26/21 0526    potassium chloride (K-DUR,KLOR-CON) CR tablet 40 mEq  40 mEq Oral BID With Meals [facility], DO   40 mEq at 02/26/21 0820    warfarin (COUMADIN) tablet 5 mg  5 mg Oral Daily (warfarin) [facility], DO   5 mg at 02/25/21 4927 Invasive Devices     Peripheral Intravenous Line            Peripheral IV 02/22/21 Right Antecubital 4 days                Lab, Imaging and other studies: I have personally reviewed pertinent reports      VTE Pharmacologic Prophylaxis: Warfarin (Coumadin)  VTE Mechanical Prophylaxis: sequential compression device    Isatu Johnson MD

## 2021-02-26 NOTE — PROGRESS NOTES
[facility]  Progress Note - Marvin Wolf 57 y o  male MRN: [id number]    Unit/Bed#: 410-01 Encounter: [id number]      Assessment and Plan:  62-year-old male with history of CHF, STEFF, CKD, atrial fibrillation on Coumadin, type 2 diabetes, cirrhosis admitted with volume overload and pleural effusion  He underwent thoracentesis this admission now with improvement of his shortness of breath  He is currently on Lasix IV  Cirrhosis:  [address]  Etiology may be ETOH, NAFLD the or cardiac  Ascites:  No appreciable ascites on imaging  He has chronic right sided pleural effusion and underwent thoracentesis with 1600 cc removed  Etiology of his pleural effusion may be due to his cardiac disease versus underlying cirrhosis  Continue diuretic management per Cardiology  Low-salt diet    Portal hypertension:  October 2020 EGD without esophageal varices  Portosystemic encephalopathy:  No history  HCC screening December 2020 ultrasound without focal lesion AFP January 2021    Follow up in GI office after discharge      Subjective:   He reports his overall improved with diuresis  He is tolerating a diet  He denies abdominal pain nausea or vomiting  Objective:     Vitals: Blood pressure 115/63, pulse 64, temperature 98 7 °F (37 1 °C), temperature source Oral, resp  rate 18, height 5' 11" (1 803 m), weight (!) 163 kg (359 lb 12 7 oz), SpO2 96 %  ,Body mass index is 50 18 kg/m²        Intake/Output Summary (Last 24 hours) at 2/26/2021 1710  Last data filed at 2/26/2021 1300  Gross per 24 hour   Intake 738 18 ml   Output 3600 ml   Net -2861 82 ml       Physical Exam    General : normocephalic, atraumatic  Eyes : EOMI  Oropharynx : Mucous membranes are moist  Neck : No lymphadenopathy  CVS :  Regular rate and rhythm  Respiratory :  Nonlabored rest additions  Abdomen : soft, obese nontender, nondistended, normoactive bowel bowel sounds  Musculoskeletal :  3+ lower extremity edema bilaterally  Skin : No rashes  Neuro : Alert, Awake and Oriented x 3           Invasive Devices     Peripheral Intravenous Line            Peripheral IV 02/22/21 Right Antecubital 4 days                        Lab, Imaging and other studies:   No results displayed because visit has over 200 results  I have personally reviewed pertinent reports

## 2021-02-26 NOTE — PLAN OF CARE
Problem: PHYSICAL THERAPY ADULT  Goal: Performs mobility at highest level of function for planned discharge setting  See evaluation for individualized goals  Description: Treatment/Interventions: LE strengthening/ROM, Functional transfer training, Elevations, Therapeutic exercise, Endurance training, Gait training, Bed mobility          See flowsheet documentation for full assessment, interventions and recommendations  Outcome: Progressing  Note: Prognosis: Good  Problem List: Decreased strength, Decreased endurance, Impaired balance, Decreased mobility, Obesity  Assessment: Pt  seen for PT treatment session this date with interventions consisting of  transfers and  gait training w/ emphasis on improving pt's ability to ambulate  Pt  Currently performing  tx and ambulation at (SUP ) x 1 level of function  Utilizing IV pole  with fair balance and stability  The patient's AM-PAC Basic Mobility Inpatient Short Form Raw Score is 23, Standardized Score is 50  88  A standardized score greater than 42 9 suggests the patient may benefit from discharge to home  Please also refer to physical therapy recommendation for safe DC planning  In comparison to previous session, Pt  With improvements in activity tolerance  Pt is in need of continued activity in PT to improve strength balance endurance mobility transfers and ambulation with return to maximize LOF  From PT/mobility standpoint, recommendation at time of d/c would be home PT in order to promote return to PLOF and independence  PT Discharge Recommendation: Home with skilled therapy          See flowsheet documentation for full assessment

## 2021-02-26 NOTE — ASSESSMENT & PLAN NOTE
Lab Results   Component Value Date    EGFR 45 02/26/2021    EGFR 47 02/25/2021    EGFR 49 02/24/2021    CREATININE 1 62 (H) 02/26/2021    CREATININE 1 55 (H) 02/25/2021    CREATININE 1 50 (H) 02/24/2021     · Baseline serum creatinine of 1 3-1 6 mg/dl  · Avoid all nephrotoxic agents  · Serial laboratory testing to monitor the patient's renal function and electrolytes

## 2021-02-26 NOTE — ASSESSMENT & PLAN NOTE
· Initiate cholecalciferol 2000 I  U  PO Qdaily  · Recheck a vitamin D 25-OH level in 1-2 months with his PCP    Results for Ebony Narayan (MRN [id number]) as of 2/24/2021 12:28   Ref   Range 2/23/2021 05:26   Vit D, 25-Hydroxy Latest Ref Range: 30 0 - 100 0 ng/mL 13 1 (L)

## 2021-02-26 NOTE — PLAN OF CARE
Problem: Potential for Falls  Goal: Patient will remain free of falls  Description: INTERVENTIONS:  - Assess patient frequently for physical needs  -  Identify cognitive and physical deficits and behaviors that affect risk of falls    -  [place] fall precautions as indicated by assessment   - Educate patient/family on patient safety including physical limitations  - Instruct patient to call for assistance with activity based on assessment  - Modify environment to reduce risk of injury  - Consider OT/PT consult to assist with strengthening/mobility  Outcome: Progressing     Problem: CARDIOVASCULAR - ADULT  Goal: Maintains optimal cardiac output and hemodynamic stability  Description: INTERVENTIONS:  - Monitor I/O, vital signs and rhythm  - Monitor for S/S and trends of decreased cardiac output  - Administer and titrate ordered vasoactive medications to optimize hemodynamic stability  - Assess quality of pulses, skin color and temperature  - Assess for signs of decreased coronary artery perfusion  - Instruct patient to report change in severity of symptoms  Outcome: Progressing  Goal: Absence of cardiac dysrhythmias or at baseline rhythm  Description: INTERVENTIONS:  - Continuous cardiac monitoring, vital signs, obtain 12 lead EKG if ordered  - Administer antiarrhythmic and heart rate control medications as ordered  - Monitor electrolytes and administer replacement therapy as ordered  Outcome: Progressing     Problem: RESPIRATORY - ADULT  Goal: Achieves optimal ventilation and oxygenation  Description: INTERVENTIONS:  - Assess for changes in respiratory status  - Assess for changes in mentation and behavior  - Position to facilitate oxygenation and minimize respiratory effort  - Oxygen administered by appropriate delivery if ordered  - Initiate smoking cessation education as indicated  - Encourage broncho-pulmonary hygiene including cough, deep breathe, Incentive Spirometry  - Assess the need for suctioning and aspirate as needed  - Assess and instruct to report SOB or any respiratory difficulty  - Respiratory Therapy support as indicated  Outcome: Progressing     Problem: METABOLIC, FLUID AND ELECTROLYTES - ADULT  Goal: Electrolytes maintained within normal limits  Description: INTERVENTIONS:  - Monitor labs and assess patient for signs and symptoms of electrolyte imbalances  - Administer electrolyte replacement as ordered  - Monitor response to electrolyte replacements, including repeat lab results as appropriate  - Instruct patient on fluid and nutrition as appropriate  Outcome: Progressing  Goal: Fluid balance maintained  Description: INTERVENTIONS:  - Monitor labs   - Monitor I/O and WT  - Instruct patient on fluid and nutrition as appropriate  - Assess for signs & symptoms of volume excess or deficit  Outcome: Progressing  Goal: Glucose maintained within target range  Description: INTERVENTIONS:  - Monitor Blood Glucose as ordered  - Assess for signs and symptoms of hyperglycemia and hypoglycemia  - Administer ordered medications to maintain glucose within target range  - Assess nutritional intake and initiate nutrition service referral as needed  Outcome: Progressing     Problem: SAFETY ADULT  Goal: Patient will remain free of falls  Description: INTERVENTIONS:  - Assess patient frequently for physical needs  -  Identify cognitive and physical deficits and behaviors that affect risk of falls    -  [place] fall precautions as indicated by assessment   - Educate patient/family on patient safety including physical limitations  - Instruct patient to call for assistance with activity based on assessment  - Modify environment to reduce risk of injury  - Consider OT/PT consult to assist with strengthening/mobility  Outcome: Progressing  Goal: Maintain or return to baseline ADL function  Description: INTERVENTIONS:  -  Assess patient's ability to carry out ADLs; assess patient's baseline for ADL function and identify physical deficits which impact ability to perform ADLs (bathing, care of mouth/teeth, toileting, grooming, dressing, etc )  - Assess/evaluate cause of self-care deficits   - Assess range of motion  - Assess patient's mobility; develop plan if impaired  - Assess patient's need for assistive devices and provide as appropriate  - Encourage maximum independence but intervene and supervise when necessary  - Involve family in performance of ADLs  - Assess for home care needs following discharge   - Consider OT consult to assist with ADL evaluation and planning for discharge  - Provide patient education as appropriate  Outcome: Progressing  Goal: Maintain or return mobility status to optimal level  Description: INTERVENTIONS:  - Assess patient's baseline mobility status (ambulation, transfers, stairs, etc )    - Identify cognitive and physical deficits and behaviors that affect mobility  - Identify mobility aids required to assist with transfers and/or ambulation (gait belt, sit-to-stand, lift, walker, cane, etc )  - [place] fall precautions as indicated by assessment  - Record patient progress and toleration of activity level on Mobility SBAR; progress patient to next Phase/Stage  - Instruct patient to call for assistance with activity based on assessment  - Consider rehabilitation consult to assist with strengthening/weightbearing, etc   Outcome: Progressing     Problem: DISCHARGE PLANNING  Goal: Discharge to home or other facility with appropriate resources  Description: INTERVENTIONS:  - Identify barriers to discharge w/patient and caregiver  - Arrange for needed discharge resources and transportation as appropriate  - Identify discharge learning needs (meds, wound care, etc )  - Refer to Case Management Department for coordinating discharge planning if the patient needs post-hospital services based on physician/advanced practitioner order or complex needs related to functional status, cognitive ability, or social support system  Outcome: Progressing     Problem: Knowledge Deficit  Goal: Patient/family/caregiver demonstrates understanding of disease process, treatment plan, medications, and discharge instructions  Description: Complete learning assessment and assess knowledge base  Interventions:  - Provide teaching at level of understanding  - Provide teaching via preferred learning methods  Outcome: Progressing     Problem: Nutrition/Hydration-ADULT  Goal: Nutrient/Hydration intake appropriate for improving, restoring or maintaining nutritional needs  Description: Monitor and assess patient's nutrition/hydration status for malnutrition  Collaborate with interdisciplinary team and initiate plan and interventions as ordered  Monitor patient's weight and dietary intake as ordered or per policy  Utilize nutrition screening tool and intervene as necessary  Determine patient's food preferences and provide high-protein, high-caloric foods as appropriate       INTERVENTIONS:  - Monitor oral intake, urinary output, labs, and treatment plans  - Assess nutrition and hydration status and recommend course of action  - Evaluate amount of meals eaten  - Assist patient with eating if necessary   - Allow adequate time for meals  - Recommend/ encourage appropriate diets, oral nutritional supplements, and vitamin/mineral supplements  - Order, calculate, and assess calorie counts as needed  - Recommend, monitor, and adjust tube feedings and TPN/PPN based on assessed needs  - Assess need for intravenous fluids  - Provide specific nutrition/hydration education as appropriate  - Include patient/family/caregiver in decisions related to nutrition  Outcome: Progressing

## 2021-02-26 NOTE — PHYSICAL THERAPY NOTE
PHYSICAL THERAPY NOTE          Patient Name: Corinne Hail  VDIYT'A Date: 2/26/2021 02/26/21 1021   Note Type   Note Type Treatment   Restrictions/Precautions   Other Precautions Fall Risk   Cognition   Overall Cognitive Status WFL   Arousal/Participation Alert; Cooperative   Following Commands Follows all commands and directions without difficulty   Bed Mobility   Additional Comments OOB in chair at start and end of PT session   Transfers   Sit to Stand 5  Supervision   Additional items Armrests   Stand to Sit 5  Supervision   Additional items Armrests   Ambulation/Elevation   Gait pattern Short stride; Foward flexed; Excessively slow;Decreased foot clearance   Gait Assistance 5  Supervision   Assistive Device   (IV pole)   Distance 150'   Balance   Ambulatory Fair   Endurance Deficit   Endurance Deficit Yes   Activity Tolerance   Activity Tolerance Patient limited by fatigue   Assessment   Prognosis Good   Problem List Decreased strength;Decreased endurance; Impaired balance;Decreased mobility;Obesity   Assessment Pt  seen for PT treatment session this date with interventions consisting of  transfers and  gait training w/ emphasis on improving pt's ability to ambulate  Pt  Currently performing  tx and ambulation at (SUP ) x 1 level of function  Utilizing IV pole  with fair balance and stability  The patient's AM-PAC Basic Mobility Inpatient Short Form Raw Score is 23, Standardized Score is 50  88  A standardized score greater than 42 9 suggests the patient may benefit from discharge to home  Please also refer to physical therapy recommendation for safe DC planning  In comparison to previous session, Pt  With improvements in activity tolerance  Pt is in need of continued activity in PT to improve strength balance endurance mobility transfers and ambulation with return to maximize LOF   From PT/mobility standpoint, recommendation at time of d/c would be home PT in order to promote return to PLOF and independence  Goals   LTG Expiration Date 03/09/21   PT Treatment Day 3   Plan   Treatment/Interventions Functional transfer training;LE strengthening/ROM; Elevations; Therapeutic exercise; Bed mobility;Gait training; Endurance training   Progress Progressing toward goals   Recommendation   PT Discharge Recommendation Home with skilled therapy   AM-PAC Basic Mobility Inpatient   Turning in Bed Without Bedrails 4   Lying on Back to Sitting on Edge of Flat Bed 4   Moving Bed to Chair 4   Standing Up From Chair 4   Walk in Room 4   Climb 3-5 Stairs 3   Basic Mobility Inpatient Raw Score 23   Basic Mobility Standardized Score 50 88   Pt  OOB in chair  with call bell within reach, all lines intact and at end of PT session  Discussed with  PT today's treatment and patient's current level of function for care coordination

## 2021-02-26 NOTE — OCCUPATIONAL THERAPY NOTE
[address] Progress Note     Patient Name: Caroline ALAN Date: 2/26/2021  Problem List  Principal Problem:    Acute on chronic diastolic CHF (congestive heart failure) (HCC)  Active Problems:    STEFF (obstructive sleep apnea)    Pleural effusion on right    Cirrhosis of liver with ascites (HCC)    CKD (chronic kidney disease) stage 3, GFR 30-59 ml/min    Lactic acidosis    Permanent atrial fibrillation ([facility] )    Diverticulosis of colon    Lesion of spleen    Type 2 diabetes mellitus with hyperglycemia, without long-term current use of insulin (HCC)    Hypoxia    Tobacco use    Hyponatremia    Constipation    Pulmonary hypertension ([facility] )    Vitamin D deficiency    Atelectasis          02/26/21 0910   OT Last Visit   OT Visit Date 02/26/21   Note Type   Note Type Treatment   Restrictions/Precautions   Weight Bearing Precautions Per Order No   Other Precautions Chair Alarm; Bed Alarm; Fall Risk;Pain   Pain Assessment   Pain Assessment Tool 0-10   Pain Location/Orientation Orientation: Bilateral;Location: Foot   ADL   Toileting Assistance  5  Supervision/Setup   Toileting Comments IV pole for txfrs   Transfers   Sit to Stand 5  Supervision   Additional items Armrests   Stand to Sit 5  Supervision   Additional items Armrests   Functional Mobility   Functional Mobility 5  Supervision   Additional Comments Pt completed standing balance/functional mobility around room and hallways with use of IV pole for balance and no LOB throughout with O2 on room air 95%  Toilet Transfers   Toilet Transfer From   (Use of IV pole for blanace)   Toilet Transfer Type To and from   Toilet Transfer to [employer] Transfers Supervision   Therapeutic Excerise-Strength   UE Strength Yes   Right Upper Extremity- Strength   R Shoulder Flexion;ABduction; Extension;Horizontal ABduction; External rotation; Internal rotation   R Wrist Wrist flexion;Wrist extension   Equipment Dumbbell   R Weight/Reps/Sets 4lb weight/2x10   Left Upper Extremity-Strength   L Shoulder Flexion;ABduction;Horizontal ABduction; Extension; External rotation; Internal rotation   L Elbow Elbow flexion;Elbow extension   L Wrist Wrist flexion;Wrist extension   Equipment Dumbell   L Weights/Reps/Sets 4lb weight/ 2x10   Cognition   Overall Cognitive Status WFL   Arousal/Participation Alert; Cooperative   Attention Within functional limits   Orientation Level Oriented X4   Memory Within functional limits   Following Commands Follows all commands and directions without difficulty   Activity Tolerance   Activity Tolerance Patient tolerated treatment well   Assessment   Assessment Patient participated in Skilled OT session this date with interventions consisting of ADL re training with the use of correct body mechnaics, therapeutic exercise to: increase functional use of BUEs, increase BUE muscle strength  and  therapeutic activities to: increase activity tolerance   Patient agreeable to OT treatment session, upon arrival patient was found seated OOB to Recliner  Patient reporting "little pain" in B feet this AM  Sit to stand txfrs from latha chair with S, standing balance/functional mobility around room and hallways with use of IV pole and S with no LOB throughout with O2 on room air 95%, to increase posture, dynamic standing balance, balance during self cares  Toileting S, toilet txfr S with use of IV pole  Pt participated in UE exercises BUE 4lb weight RUE all shoulder planes and wrist flex/ext 2x10, 4lb weight L UE 2x10 all planes, to increase strength, endurance, ROM, txfrs, self cares  Pt required verbal and visual cues throughout to complete proper ROM  Pt required rest breaks throughout exercises  Patient requiring verbal cues for safety and verbal cues for correct technique   Patient continues to be functioning below baseline level, occupational performance remains limited secondary to factors listed above and increased risk for falls and injury  From OT standpoint, recommendation at time of d/c would be Home OT  Discussed with OT patients current status, continue plan of care progressing as tolerated  Patient to benefit from continued Occupational Therapy treatment while in the hospital to address deficits as defined above and maximize level of functional independence with ADLs and functional mobility  Pt's raw score on the AM-PAC Daily Activity inpatient short form is 19, standardized score is 40 232, however, please refer to therapist recommendation for safe DC planning  Plan   Goal Expiration Date 03/09/21   OT Frequency 3-5x/wk   Recommendation   OT Discharge Recommendation Home with skilled therapy   AM-[facility] Daily Activity Inpatient   Lower Body Dressing 2   Bathing 2   Toileting 3   Upper Body Dressing 4   Grooming 4   Eating 4   Daily Activity Raw Score 19   Daily Activity Standardized Score (Calc for Raw Score >=11) 40 22   AM-PAC Applied Cognition Inpatient   Following a Speech/Presentation 4   Understanding Ordinary Conversation 4   Taking Medications 4   Remembering Where Things Are Placed or Put Away 4   Remembering List of 4-5 Errands 4   Taking Care of Complicated Tasks 4   Applied Cognition Raw Score 24   Applied Cognition Standardized Score 62 21     Patient left in latha chair with all needs in reach

## 2021-02-26 NOTE — ASSESSMENT & PLAN NOTE
· Continue PO coreg and PO digoxin  · Continue anticoagulation with PO coumadin for a goal INR of 2-3  · Outpatient follow-up with Cardiology    Results for Jeanie Barber (MRN [id number]) as of 2/24/2021 12:39   Ref   Range 2/23/2021 05:27   DIGOXIN LEVEL Latest Ref Range: 0 8 - 2 0 ng/mL 1 7

## 2021-02-26 NOTE — ASSESSMENT & PLAN NOTE
Wt Readings from Last 3 Encounters:   02/26/21 (!) 163 kg (359 lb 12 7 oz)   01/13/21 (!) 159 kg (351 lb 6 4 oz)   01/12/21 (!) 161 kg (356 lb)     · Weight trended down from 363 --> 359lb since started on Lasix gtt yesterday  · Troponin levels were within normal limits x 3 sets  · Transitioned to Lasix gtt @ 10 mg/hr, titrate as needed  · Continue coreg 6 25 mg PO BID  · The patient was seen in consultation by Cardiology  Appreciate recommendation    · Monitor the patient's renal function closely while receiving Lasix gtt treatment  · Daily weights  · Strict intake/output measurements  · PT/OT

## 2021-02-26 NOTE — PLAN OF CARE
Problem: OCCUPATIONAL THERAPY ADULT  Goal: Performs self-care activities at highest level of function for planned discharge setting  See evaluation for individualized goals  Description: Treatment Interventions: ADL retraining, Functional transfer training, UE strengthening/ROM, Endurance training, Patient/family training, Equipment evaluation/education, Activityengagement, Energy conservation          See flowsheet documentation for full assessment, interventions and recommendations  Outcome: Progressing  Note: Limitation: Decreased ADL status, Decreased UE strength, Decreased endurance, Decreased self-care trans, Decreased high-level ADLs     Assessment: Patient participated in Skilled OT session this date with interventions consisting of ADL re training with the use of correct body mechnaics, therapeutic exercise to: increase functional use of BUEs, increase BUE muscle strength  and  therapeutic activities to: increase activity tolerance   Patient agreeable to OT treatment session, upon arrival patient was found seated OOB to Recliner  Patient reporting "little pain" in B feet this AM  Sit to stand txfrs from latha chair with S, standing balance/functional mobility around room and hallways with use of IV pole and S with no LOB throughout with O2 on room air 95%, to increase posture, dynamic standing balance, balance during self cares  Toileting S, toilet txfr S with use of IV pole  Pt participated in UE exercises BUE 4lb weight RUE all shoulder planes and wrist flex/ext 2x10, 4lb weight L UE 2x10 all planes, to increase strength, endurance, ROM, txfrs, self cares  Pt required verbal and visual cues throughout to complete proper ROM  Pt required rest breaks throughout exercises  Patient requiring verbal cues for safety and verbal cues for correct technique   Patient continues to be functioning below baseline level, occupational performance remains limited secondary to factors listed above and increased risk for falls and injury  From OT standpoint, recommendation at time of d/c would be Home OT  Discussed with OT patients current status, continue plan of care progressing as tolerated  Patient to benefit from continued Occupational Therapy treatment while in the hospital to address deficits as defined above and maximize level of functional independence with ADLs and functional mobility  Pt's raw score on the AM-PAC Daily Activity inpatient short form is 19, standardized score is 40 232, however, please refer to therapist recommendation for safe DC planning       OT Discharge Recommendation: Home with skilled therapy  OT - OK to Discharge: Yes(When medically cleared)

## 2021-02-27 PROBLEM — D64.9 ANEMIA: Status: ACTIVE | Noted: 2021-01-01

## 2021-02-27 NOTE — ASSESSMENT & PLAN NOTE
Lab Results   Component Value Date    EGFR 44 02/27/2021    EGFR 45 02/26/2021    EGFR 47 02/25/2021    CREATININE 1 66 (H) 02/27/2021    CREATININE 1 62 (H) 02/26/2021    CREATININE 1 55 (H) 02/25/2021     · Baseline serum creatinine of 1 3-1 6 mg/dl  · Slight increase in the creatinine level while on the IV lasix drip/infusion  · Avoid all nephrotoxic agents  · Serial laboratory testing to monitor the patient's renal function and electrolytes

## 2021-02-27 NOTE — ASSESSMENT & PLAN NOTE
· Check an iron panel, vitamin B12 level, and folate level  · Check the patient's stool for occult blood x 3 specimens  · Follow the CBC  · Transfuse for a hemoglobin less than 7 g/dl    Results from last 7 days   Lab Units 02/27/21  0638 02/26/21  0558 02/25/21  0528   WBC Thousand/uL 5 46 5 79 5 97   HEMOGLOBIN g/dL 9 1* 9 1* 9 3*   HEMATOCRIT % 31 0* 31 3* 31 6*   PLATELETS Thousands/uL 169 173 161

## 2021-02-27 NOTE — PLAN OF CARE
Problem: Potential for Falls  Goal: Patient will remain free of falls  Description: INTERVENTIONS:  - Assess patient frequently for physical needs  -  Identify cognitive and physical deficits and behaviors that affect risk of falls    -  [place] fall precautions as indicated by assessment   - Educate patient/family on patient safety including physical limitations  - Instruct patient to call for assistance with activity based on assessment  - Modify environment to reduce risk of injury  - Consider OT/PT consult to assist with strengthening/mobility  Outcome: Progressing     Problem: CARDIOVASCULAR - ADULT  Goal: Maintains optimal cardiac output and hemodynamic stability  Description: INTERVENTIONS:  - Monitor I/O, vital signs and rhythm  - Monitor for S/S and trends of decreased cardiac output  - Administer and titrate ordered vasoactive medications to optimize hemodynamic stability  - Assess quality of pulses, skin color and temperature  - Assess for signs of decreased coronary artery perfusion  - Instruct patient to report change in severity of symptoms  Outcome: Progressing  Goal: Absence of cardiac dysrhythmias or at baseline rhythm  Description: INTERVENTIONS:  - Continuous cardiac monitoring, vital signs, obtain 12 lead EKG if ordered  - Administer antiarrhythmic and heart rate control medications as ordered  - Monitor electrolytes and administer replacement therapy as ordered  Outcome: Progressing     Problem: RESPIRATORY - ADULT  Goal: Achieves optimal ventilation and oxygenation  Description: INTERVENTIONS:  - Assess for changes in respiratory status  - Assess for changes in mentation and behavior  - Position to facilitate oxygenation and minimize respiratory effort  - Oxygen administered by appropriate delivery if ordered  - Initiate smoking cessation education as indicated  - Encourage broncho-pulmonary hygiene including cough, deep breathe, Incentive Spirometry  - Assess the need for suctioning and aspirate as needed  - Assess and instruct to report SOB or any respiratory difficulty  - Respiratory Therapy support as indicated  Outcome: Progressing     Problem: METABOLIC, FLUID AND ELECTROLYTES - ADULT  Goal: Electrolytes maintained within normal limits  Description: INTERVENTIONS:  - Monitor labs and assess patient for signs and symptoms of electrolyte imbalances  - Administer electrolyte replacement as ordered  - Monitor response to electrolyte replacements, including repeat lab results as appropriate  - Instruct patient on fluid and nutrition as appropriate  Outcome: Progressing  Goal: Fluid balance maintained  Description: INTERVENTIONS:  - Monitor labs   - Monitor I/O and WT  - Instruct patient on fluid and nutrition as appropriate  - Assess for signs & symptoms of volume excess or deficit  Outcome: Progressing  Goal: Glucose maintained within target range  Description: INTERVENTIONS:  - Monitor Blood Glucose as ordered  - Assess for signs and symptoms of hyperglycemia and hypoglycemia  - Administer ordered medications to maintain glucose within target range  - Assess nutritional intake and initiate nutrition service referral as needed  Outcome: Progressing     Problem: SAFETY ADULT  Goal: Patient will remain free of falls  Description: INTERVENTIONS:  - Assess patient frequently for physical needs  -  Identify cognitive and physical deficits and behaviors that affect risk of falls    -  [place] fall precautions as indicated by assessment   - Educate patient/family on patient safety including physical limitations  - Instruct patient to call for assistance with activity based on assessment  - Modify environment to reduce risk of injury  - Consider OT/PT consult to assist with strengthening/mobility  Outcome: Progressing  Goal: Maintain or return to baseline ADL function  Description: INTERVENTIONS:  -  Assess patient's ability to carry out ADLs; assess patient's baseline for ADL function and identify physical deficits which impact ability to perform ADLs (bathing, care of mouth/teeth, toileting, grooming, dressing, etc )  - Assess/evaluate cause of self-care deficits   - Assess range of motion  - Assess patient's mobility; develop plan if impaired  - Assess patient's need for assistive devices and provide as appropriate  - Encourage maximum independence but intervene and supervise when necessary  - Involve family in performance of ADLs  - Assess for home care needs following discharge   - Consider OT consult to assist with ADL evaluation and planning for discharge  - Provide patient education as appropriate  Outcome: Progressing  Goal: Maintain or return mobility status to optimal level  Description: INTERVENTIONS:  - Assess patient's baseline mobility status (ambulation, transfers, stairs, etc )    - Identify cognitive and physical deficits and behaviors that affect mobility  - Identify mobility aids required to assist with transfers and/or ambulation (gait belt, sit-to-stand, lift, walker, cane, etc )  - [place] fall precautions as indicated by assessment  - Record patient progress and toleration of activity level on Mobility SBAR; progress patient to next Phase/Stage  - Instruct patient to call for assistance with activity based on assessment  - Consider rehabilitation consult to assist with strengthening/weightbearing, etc   Outcome: Progressing     Problem: DISCHARGE PLANNING  Goal: Discharge to home or other facility with appropriate resources  Description: INTERVENTIONS:  - Identify barriers to discharge w/patient and caregiver  - Arrange for needed discharge resources and transportation as appropriate  - Identify discharge learning needs (meds, wound care, etc )  - Refer to Case Management Department for coordinating discharge planning if the patient needs post-hospital services based on physician/advanced practitioner order or complex needs related to functional status, cognitive ability, or social support system  Outcome: Progressing     Problem: Knowledge Deficit  Goal: Patient/family/caregiver demonstrates understanding of disease process, treatment plan, medications, and discharge instructions  Description: Complete learning assessment and assess knowledge base  Interventions:  - Provide teaching at level of understanding  - Provide teaching via preferred learning methods  Outcome: Progressing     Problem: Nutrition/Hydration-ADULT  Goal: Nutrient/Hydration intake appropriate for improving, restoring or maintaining nutritional needs  Description: Monitor and assess patient's nutrition/hydration status for malnutrition  Collaborate with interdisciplinary team and initiate plan and interventions as ordered  Monitor patient's weight and dietary intake as ordered or per policy  Utilize nutrition screening tool and intervene as necessary  Determine patient's food preferences and provide high-protein, high-caloric foods as appropriate       INTERVENTIONS:  - Monitor oral intake, urinary output, labs, and treatment plans  - Assess nutrition and hydration status and recommend course of action  - Evaluate amount of meals eaten  - Assist patient with eating if necessary   - Allow adequate time for meals  - Recommend/ encourage appropriate diets, oral nutritional supplements, and vitamin/mineral supplements  - Order, calculate, and assess calorie counts as needed  - Recommend, monitor, and adjust tube feedings and TPN/PPN based on assessed needs  - Assess need for intravenous fluids  - Provide specific nutrition/hydration education as appropriate  - Include patient/family/caregiver in decisions related to nutrition  Outcome: Progressing

## 2021-02-27 NOTE — ASSESSMENT & PLAN NOTE
· Continue PO coreg and PO digoxin  · Continue anticoagulation with PO coumadin for a goal INR of 2-3  · Outpatient follow-up with Cardiology    Results for Donald Crockett (MRN [id number]) as of 2/24/2021 12:39   Ref   Range 2/23/2021 05:27   DIGOXIN LEVEL Latest Ref Range: 0 8 - 2 0 ng/mL 1 7

## 2021-02-27 NOTE — ASSESSMENT & PLAN NOTE
Wt Readings from Last 3 Encounters:   02/27/21 (!) 162 kg (357 lb 12 9 oz)   01/13/21 (!) 159 kg (351 lb 6 4 oz)   01/12/21 (!) 161 kg (356 lb)     · Troponin levels were within normal limits x 3 sets  · Transitioned to lasix gtt @ 10 mg/hr on 02/25/2021  · Continue coreg 6 25 mg PO BID  · The patient was seen in consultation by Cardiology    · Monitor the patient's renal function closely while receiving the lasix gtt treatment  · Daily weights  · Strict intake/output measurements  · PT/OT

## 2021-02-27 NOTE — ASSESSMENT & PLAN NOTE
· Recurrent right-sided pleural effusion  · Successful right-sided thoracentesis by [facility]iology on 02/23/2021, which yielded 1600 ml of pleural fluid  · Transitioned to lasix gtt @ 10 mg/hr on 02/25/2021  · Appears to be a transudative pleural effusion by fluid analysis  · The patient was seen in consultation by Pulmonology

## 2021-02-27 NOTE — ASSESSMENT & PLAN NOTE
Lab Results   Component Value Date    HGBA1C 6 1 (H) 01/05/2021       Recent Labs     02/26/21  1103 02/26/21  1556 02/26/21 2054 02/27/21  0721   POCGLU 108 125 121 120       Blood Sugar Average: Last 72 hrs:  (P) 469 [id number]     · Recently take off metformin as an outpatient secondary to his liver disease  · Not currently on any diabetic medications as an outpatient  · Consider the initiation of an ACE-inhibitor for renal protection in the setting of type 2 diabetes mellitus if his renal function remains stable  · Insulin sliding scale with blood glucose monitoring ACHS

## 2021-02-27 NOTE — PROGRESS NOTES
Progress Note - Azam Becker 1960, 57 y o  male MRN: [id number]    Unit/Bed#: 410-01 Encounter: [id number]    Primary Care Provider: Britni Salazar DO   Date and time admitted to hospital: 2/22/2021  9:27 AM        * Acute on chronic diastolic CHF (congestive heart failure) [facility])  Assessment & Plan  Wt Readings from Last 3 Encounters:   02/27/21 (!) 162 kg (357 lb 12 9 oz)   01/13/21 (!) 159 kg (351 lb 6 4 oz)   01/12/21 (!) 161 kg (356 lb)     · Troponin levels were within normal limits x 3 sets  · Transitioned to lasix gtt @ 10 mg/hr on 02/25/2021  · Continue coreg 6 25 mg PO BID  · The patient was seen in consultation by Cardiology  · Monitor the patient's renal function closely while receiving the lasix gtt treatment  · Daily weights  · Strict intake/output measurements  · PT/OT        Pleural effusion on right  Assessment & Plan  · Recurrent right-sided pleural effusion  · Successful right-sided thoracentesis by [facility] on 02/23/2021, which yielded 1600 ml of pleural fluid  · Transitioned to lasix gtt @ 10 mg/hr on 02/25/2021  · Appears to be a transudative pleural effusion by fluid analysis  · The patient was seen in consultation by Pulmonology  Hypoxia  Assessment & Plan  · Initially required 4 lpm of continuous supplemental oxygen via the nasal cannula to maintain oxygen saturations at 92% and above  · Successfully weaned off supplemental oxygen at this time  · Secondary to right-sided pleural effusion  · On the day of discharge, we will need to check an ambulatory pulse oximetry test to see if the patient qualifies for home supplemental oxygen  · Respiratory protocol  · Incentive spirometry 10 times per hour while awake    Cirrhosis of liver with ascites [facility])  Assessment & Plan  · The patient was seen in consultation by Gastroenterology    · Avoid all hepatotoxic agents  · Outpatient hepatocellular carcinoma surveillance and follow-up with Gastroenterology     liver doppler only  Status: Final result   PACS Images     Show images for US liver doppler only   Study Result    ABDOMEN ULTRASOUND, COMPLETE WITH DOPPLER     INDICATION:    Cirrhosis with ascites        COMPARISON:  Compared with 10/2/2020     TECHNIQUE:   Real-time ultrasound of the abdomen was performed with a curvilinear transducer with Doppler evaluation of liver      FINDINGS:     The main portal vein and primary branch segments are patent and hepatopetal with normal spectral waveform  Peak systolic velocity at 32 cm/s  The right at 42 cm/s and the left at 59 cm/s      Hepatic veins are patent  Spectral waveforms within normal limits  Right hepatic vein at 57 cm/s, mid hepatic vein at 56 cm/s and left hepatic vein at 77 cm/s        Main hepatic artery appears normal size, patent with normal spectral waveform      Splenic vein is patent  56 cm/s      IMPRESSION:     Patent portal vein and hepatic veins  Anemia  Assessment & Plan  · Check an iron panel, vitamin B12 level, and folate level  · Check the patient's stool for occult blood x 3 specimens  · Follow the CBC  · Transfuse for a hemoglobin less than 7 g/dl    Results from last 7 days   Lab Units 02/27/21  0638 02/26/21  0558 02/25/21  0528   WBC Thousand/uL 5 46 5 79 5 97   HEMOGLOBIN g/dL 9 1* 9 1* 9 3*   HEMATOCRIT % 31 0* 31 3* 31 6*   PLATELETS Thousands/uL 169 173 161         Atelectasis  Assessment & Plan  · Incentive spirometry 10 times per hour while awake    Vitamin D deficiency  Assessment & Plan  · Initiated on cholecalciferol 2000 I  U  PO Qdaily  · Recheck a vitamin D 25-OH level in 1-2 months with his PCP    Results for Yareli Machado (MRN [id number]) as of 2/24/2021 12:28   Ref   Range 2/23/2021 05:26   Vit D, 25-Hydroxy Latest Ref Range: 30 0 - 100 0 ng/mL 13 1 (L)           Pulmonary hypertension (HCC)  Assessment & Plan  · Outpatient follow-up with Pulmonology    Constipation  Assessment & Plan  · Received miralax 17 grams PO x 1 dose on 02/24/2021  · The constipation has improved  · Continue colace 100 mg PO BID and dulcolax 10 mg PO Qdaily after dinner    Hyponatremia  Assessment & Plan  · Likely hypervolemic hyponatremia  · Resolved with IV lasix treatment  · Initiated on a lasix gtt @ 10 mg/hr on 02/25/2021  · Follow the sodium level    Results from last 7 days   Lab Units 02/27/21  0638 02/26/21  0558 02/25/21  0528   SODIUM mmol/L 141 139 137   POTASSIUM mmol/L 3 8 3 5 4 0   CHLORIDE mmol/L 101 98* 98*   CO2 mmol/L 34* 33* 32   BUN mg/dL 25 24 21   CREATININE mg/dL 1 66* 1 62* 1 55*   CALCIUM mg/dL 8 5 8 6 8 5         Tobacco use  Assessment & Plan  · Nicotine patch  · Smoking cessation counseling    Type 2 diabetes mellitus with hyperglycemia, without long-term current use of insulin [facility])  Assessment & Plan  Lab Results   Component Value Date    HGBA1C 6 1 (H) 01/05/2021       Recent Labs     02/26/21  1103 02/26/21  1556 02/26/21  2054 02/27/21  0721   POCGLU 108 125 121 120       Blood Sugar Average: Last 72 hrs:  (P) 807 [id number]     · Recently take off metformin as an outpatient secondary to his liver disease  · Not currently on any diabetic medications as an outpatient  · Consider the initiation of an ACE-inhibitor for renal protection in the setting of type 2 diabetes mellitus if his renal function remains stable  · Insulin sliding scale with blood glucose monitoring ACHS    Lesion of spleen  Assessment & Plan  · CT scan of the abdomen/pelvis (02/22/2021):   Subcapsular cystic lesion in the spleen (probably related to old trauma)   Outpatient follow-up with PCP in regards to this matter    Diverticulosis of colon  Assessment & Plan  · Outpatient follow-up with Gastroenterology    Permanent atrial fibrillation ([facility] [address] )  Assessment & Plan  · Continue PO coreg and PO digoxin  · Continue anticoagulation with PO coumadin for a goal INR of 2-3  · Outpatient follow-up with Cardiology    Results for Cam Marr (MRN [id number]) as of 2021 12:39   Ref  Range 2021 05:27   DIGOXIN LEVEL Latest Ref Range: 0 8 - 2 0 ng/mL 1 7       Lactic acidosis  Assessment & Plan  · Resolved with thiamine 200 mg IV x 1 dose on 2021    CKD (chronic kidney disease) stage 3, GFR 30-59 ml/min  Assessment & Plan  Lab Results   Component Value Date    EGFR 44 2021    EGFR 45 2021    EGFR 47 2021    CREATININE 1 66 (H) 2021    CREATININE 1 62 (H) 2021    CREATININE 1 55 (H) 2021     · Baseline serum creatinine of 1 3-1 6 mg/dl  · Slight increase in the creatinine level while on the IV lasix drip/infusion  · Avoid all nephrotoxic agents  · Serial laboratory testing to monitor the patient's renal function and electrolytes    STEFF (obstructive sleep apnea)  Assessment & Plan  · Continue BIPAP at 17/11 QHS and anytime while sleeping        VTE Pharmacologic Prophylaxis:   Pharmacologic: Warfarin (Coumadin) for a goal INR of 2-3  Mechanical VTE Prophylaxis in Place: Yes    Patient Centered Rounds: I have performed bedside rounds with nursing staff today  Time Spent for Care: 30 minutes  More than 50% of total time spent on counseling and coordination of care as described above  Current Length of Stay: 5 day(s)    Current Patient Status: Inpatient   Certification Statement: The patient will continue to require additional inpatient hospital stay due to the need for an IV lasix drip/infusion  Code Status: Level 1 - Full Code      Subjective: The patient was seen and examined  The patient is doing better  The bilateral lower extremity edema is improving  No chest pain  No shortness of breath  No abdominal pain  No nausea or vomiting        Objective:     Vitals:   Temp (24hrs), Av 4 °F (36 9 °C), Min:98 1 °F (36 7 °C), Max:98 7 °F (37 1 °C)    Temp:  [98 1 °F (36 7 °C)-98 7 °F (37 1 °C)] 98 4 °F (36 9 °C)  HR:  [60-68] 60  Resp:  [18-20] 20  BP: (113-115)/(57-64) 113/63  SpO2:  [94 %-97 %] 97 %  Body mass index is 49 9 kg/m²  Input and Output Summary (last 24 hours):        Intake/Output Summary (Last 24 hours) at 2/27/2021 1028  Last data filed at 2/27/2021 0917  Gross per 24 hour   Intake 360 ml   Output 4150 ml   Net -3790 ml       Physical Exam:     Physical Exam  General:  NAD, follows commands  HEENT:  NC/AT, mucous membranes moist  Neck:  Supple, No JVP elevation  CV:  + S1, + S2, Irregularly irregular rhythm, Regular rate  Pulm:  Lung fields are CTA bilaterally  Abd:  Soft, Non-tender, Improving distension  Ext:  No clubbing/cyanosis, Improving edema of the bilateral lower extremities  Skin:  No rashes  Neuro:  Awake, alert, oriented  Psych:  Normal mood and affect      Additional Data:    Labs:    Results from last 7 days   Lab Units 02/27/21  0638 02/26/21  0558   WBC Thousand/uL 5 46 5 79   HEMOGLOBIN g/dL 9 1* 9 1*   HEMATOCRIT % 31 0* 31 3*   PLATELETS Thousands/uL 169 173   NEUTROS PCT %  --  74   LYMPHS PCT %  --  11*   MONOS PCT %  --  8   EOS PCT %  --  5     Results from last 7 days   Lab Units 02/27/21  0638 02/26/21  0558   SODIUM mmol/L 141 139   POTASSIUM mmol/L 3 8 3 5   CHLORIDE mmol/L 101 98*   CO2 mmol/L 34* 33*   BUN mg/dL 25 24   CREATININE mg/dL 1 66* 1 62*   ANION GAP mmol/L 6 8   CALCIUM mg/dL 8 5 8 6   ALBUMIN g/dL  --  2 6*   TOTAL BILIRUBIN mg/dL  --  0 80   ALK PHOS U/L  --  127*   ALT U/L  --  20   AST U/L  --  19   GLUCOSE RANDOM mg/dL 120 126     Results from last 7 days   Lab Units 02/27/21  0638   INR  2 48*     Results from last 7 days   Lab Units 02/27/21  0721 02/26/21  2054 02/26/21  1556 02/26/21  1103 02/26/21  0741 02/25/21  2115 02/25/21  1550 02/25/21  1056 02/25/21  0649 02/24/21  2042 02/24/21  1520 02/24/21  1136   POC GLUCOSE mg/dl 120 121 125 108 169* 130 124 125 120 140 119 117         Results from last 7 days   Lab Units 02/26/21  0558 02/25/21  0528 02/24/21  0637 02/23/21  0527 02/23/21  0525 02/22/21  0945   LACTIC ACID mmol/L  --   --   -- 0 9  --  2 1*   PROCALCITONIN ng/ml 0 21 0 21 0 21  --  0 27*  --            * I Have Reviewed All Lab Data Listed Above  * Additional Pertinent Lab Tests Reviewed: Zac Mosqueda Admission Reviewed      Recent Cultures (last 7 days):     Results from last 7 days   Lab Units 02/23/21  1016 02/22/21  1159 02/22/21  0945   BLOOD CULTURE   --  No Growth After 4 Days  No Growth After 4 Days  GRAM STAIN RESULT  Rare Polys  No bacteria seen  --   --    BODY FLUID CULTURE, STERILE  1+ Growth of Staphylococcus warneri*  Few Colonies of Staphylococcus epidermidis*  Growth in Broth culture only Corynebacterium aurimucosum group*  --   --        Last 24 Hours Medication List:   Current Facility-Administered Medications   Medication Dose Route Frequency Provider Last Rate    bisacodyl  10 mg Oral After Jose Ritchie, DO      carvedilol  6 25 mg Oral BID With Meals Padmini Nereida, DO      cholecalciferol  2,000 Units Oral Daily Padmini Nereida, DO      digoxin  250 mcg Oral Daily Padmini Nereida, DO      docusate sodium  100 mg Oral BID Padmini Nereida, DO      furosemide  10 mg/hr Intravenous Continuous Tia Maury, PA-C 10 mg/hr (02/26/21 1709)    insulin lispro  1-6 Units Subcutaneous HS Padmini Nereida, DO      insulin lispro  2-12 Units Subcutaneous TID AC Padmini Nereida, DO      nicotine  1 patch Transdermal Daily Padmini Nereida, DO      oxyCODONE  5 mg Oral Q4H PRN Padmini Nereida, DO      pantoprazole  40 mg Oral Early Morning Padmini Nereida, DO      polyvinyl alcohol  2 drop Both Eyes Q3H PRN Padmini Nereida, DO      warfarin  5 mg Oral Daily (warfarin) Padmini Nereida, DO          Today, Patient Was Seen By: Padmini Padron DO    ** Please Note: Dictation voice to text software may have been used in the creation of this document   **

## 2021-02-27 NOTE — ASSESSMENT & PLAN NOTE
· Received miralax 17 grams PO x 1 dose on 02/24/2021  · The constipation has improved    · Continue colace 100 mg PO BID and dulcolax 10 mg PO Qdaily after dinner

## 2021-02-27 NOTE — ASSESSMENT & PLAN NOTE
· Initially required 4 lpm of continuous supplemental oxygen via the nasal cannula to maintain oxygen saturations at 92% and above  · Successfully weaned off supplemental oxygen at this time  · Secondary to right-sided pleural effusion  · On the day of discharge, we will need to check an ambulatory pulse oximetry test to see if the patient qualifies for home supplemental oxygen    · Respiratory protocol  · Incentive spirometry 10 times per hour while awake

## 2021-02-27 NOTE — ASSESSMENT & PLAN NOTE
· Initiated on cholecalciferol 2000 I  U  PO Qdaily  · Recheck a vitamin D 25-OH level in 1-2 months with his PCP    Results for Blake Gardiner (MRN [id number]) as of 2/24/2021 12:28   Ref   Range 2/23/2021 05:26   Vit D, 25-Hydroxy Latest Ref Range: 30 0 - 100 0 ng/mL 13 1 (L)

## 2021-02-27 NOTE — RESPIRATORY THERAPY NOTE
02/26/21 2100   Oxygen Therapy/Pulse Ox   O2 Device None (Room air)   O2 Therapy Room air   SpO2 94 %   SpO2 Activity At Rest   $ Pulse Oximetry Spot Check Charge Completed       Patient received in his room, sitting in the chair in no respiratory distress  Patient is declining use of the bipap tonight stating that it is too drying   I did mention to him that he could have his brought in, he stated he knew that, but no

## 2021-02-27 NOTE — ASSESSMENT & PLAN NOTE
· CT scan of the abdomen/pelvis (02/22/2021):   Subcapsular cystic lesion in the spleen (probably related to old trauma)   Outpatient follow-up with PCP in regards to this matter

## 2021-02-28 NOTE — RESPIRATORY THERAPY NOTE
02/27/21 2040   Oxygen Therapy/Pulse Ox   O2 Device None (Room air)   O2 Therapy Room air   SpO2 92 %   SpO2 Activity At Rest     Patient received in his room, he is in the chair, he was sleeping  He was easily woken so I could ask him if he wanted to go on pap therapy  Patient is refusing, stating the machine is too drying   Patient denying sob at this time

## 2021-02-28 NOTE — ASSESSMENT & PLAN NOTE
· Initiated on cholecalciferol 2000 I  U  PO Qdaily  · Recheck a vitamin D 25-OH level in 1-2 months with his PCP    Results for Yareli Machado (MRN [id number]) as of 2/24/2021 12:28   Ref   Range 2/23/2021 05:26   Vit D, 25-Hydroxy Latest Ref Range: 30 0 - 100 0 ng/mL 13 1 (L)

## 2021-02-28 NOTE — ASSESSMENT & PLAN NOTE
· Continue PO coreg and PO digoxin  · Continue anticoagulation with PO coumadin for a goal INR of 2-3  · Outpatient follow-up with Cardiology    Results for Kristy Shoemaker (MRN [id number]) as of 2/24/2021 12:39   Ref   Range 2/23/2021 05:27   DIGOXIN LEVEL Latest Ref Range: 0 8 - 2 0 ng/mL 1 7

## 2021-02-28 NOTE — PROGRESS NOTES
Progress Note - Corinne Hail 1960, 57 y o  male MRN: [id number]    Unit/Bed#: 410-01 Encounter: [id number]    Primary Care Provider: Qian Elaine DO   Date and time admitted to hospital: 2/22/2021  9:27 AM        * Acute on chronic diastolic CHF (congestive heart failure) ([address] )  Assessment & Plan  Wt Readings from Last 3 Encounters:   02/28/21 (!) 161 kg (355 lb 9 6 oz)   01/13/21 (!) 159 kg (351 lb 6 4 oz)   01/12/21 (!) 161 kg (356 lb)     · Troponin levels were within normal limits x 3 sets  · Transitioned to lasix gtt @ 20 mg/hr on 02/28/2021 with goal of losing 8lbs today per nephrology recommendation  · Metolazone 2 5 mg PO x1 today  · Continue coreg 6 25 mg PO BID  · The patient was seen in consultation by Cardiology  · Monitor the patient's renal function closely while receiving the lasix gtt treatment  · Daily weights  · Strict intake/output measurements  · PT/OT  · Nephrology recommendation appreciated        Anemia  Assessment & Plan  · Check an iron panel, vitamin B12 level, and folate level  · Check the patient's stool for occult blood x 3 specimens  · Follow the CBC  · Transfuse for a hemoglobin less than 7 g/dl     Results from last 7 days   Lab Units 02/28/21  0454 02/27/21  0638 02/26/21  0558   WBC Thousand/uL 5 75 5 46 5 79   HEMOGLOBIN g/dL 8 4* 9 1* 9 1*   HEMATOCRIT % 28 4* 31 0* 31 3*   PLATELETS Thousands/uL 175 169 173         Atelectasis  Assessment & Plan  · Incentive spirometry 10 times per hour while awake    Vitamin D deficiency  Assessment & Plan  · Initiated on cholecalciferol 2000 I  U  PO Qdaily  · Recheck a vitamin D 25-OH level in 1-2 months with his PCP    Results for [place] (MRN [id number]) as of 2/24/2021 12:28   Ref   Range 2/23/2021 05:26   Vit D, 25-Hydroxy Latest Ref Range: 30 0 - 100 0 ng/mL 13 1 (L)           Pulmonary hypertension (HCC)  Assessment & Plan  · Outpatient follow-up with Pulmonology    Constipation  Assessment & Plan  · Received miralax 17 grams PO x 1 dose on 02/24/2021  · The constipation has improved  · Continue colace 100 mg PO BID and dulcolax 10 mg PO Qdaily after dinner    Hyponatremia  Assessment & Plan  · Likely hypervolemic hyponatremia  · Resolved with IV lasix treatment  · Increased lasix gtt @ 20 mg/hr on 02/28/2021  · Follow the sodium level    Results from last 7 days   Lab Units 02/28/21  0454 02/27/21  0638 02/26/21  0558   SODIUM mmol/L 139 141 139   POTASSIUM mmol/L 3 4* 3 8 3 5   CHLORIDE mmol/L 101 101 98*   CO2 mmol/L 31 34* 33*   BUN mg/dL 27* 25 24   CREATININE mg/dL 1 66* 1 66* 1 62*   CALCIUM mg/dL 8 6 8 5 8 6         Tobacco use  Assessment & Plan  · Nicotine patch  · Smoking cessation counseling    Hypoxia  Assessment & Plan  · Initially required 4 lpm of continuous supplemental oxygen via the nasal cannula to maintain oxygen saturations at 92% and above  · Successfully weaned off supplemental oxygen at this time  · Secondary to right-sided pleural effusion  · On the day of discharge, we will need to check an ambulatory pulse oximetry test to see if the patient qualifies for home supplemental oxygen    · Respiratory protocol  · Incentive spirometry 10 times per hour while awake    Type 2 diabetes mellitus with hyperglycemia, without long-term current use of insulin [facility])  Assessment & Plan  Lab Results   Component Value Date    HGBA1C 6 1 (H) 01/05/2021       Recent Labs     02/27/21  1534 02/27/21  2137 02/28/21  0747 02/28/21  1113   POCGLU 129 117 112 196*       Blood Sugar Average: Last 72 hrs:  (P) 129 [id number]     · Recently take off metformin as an outpatient secondary to his liver disease  · Not currently on any diabetic medications as an outpatient  · Consider the initiation of an ACE-inhibitor for renal protection in the setting of type 2 diabetes mellitus if his renal function remains stable  · Insulin sliding scale with blood glucose monitoring ACHS    Lesion of spleen  Assessment & Plan  · CT scan of the abdomen/pelvis (02/22/2021): Subcapsular cystic lesion in the spleen (probably related to old trauma)   Outpatient follow-up with PCP in regards to this matter    Diverticulosis of colon  Assessment & Plan  · Outpatient follow-up with Gastroenterology    Permanent atrial fibrillation ([facility] )  Assessment & Plan  · Continue PO coreg and PO digoxin  · Continue anticoagulation with PO coumadin for a goal INR of 2-3  · Outpatient follow-up with Cardiology    Results for Leslie Peoples (MRN [id number]) as of 2/24/2021 12:39   Ref  Range 2/23/2021 05:27   DIGOXIN LEVEL Latest Ref Range: 0 8 - 2 0 ng/mL 1 7       Lactic acidosis  Assessment & Plan  · Resolved with thiamine 200 mg IV x 1 dose on 02/22/2021    Hypokalemia  Assessment & Plan  K+ of 3 4 repleted with PO KCl 40mEq today  Continue to monitor BMP and Mg    CKD (chronic kidney disease) stage 3, GFR 30-59 ml/min  Assessment & Plan  Lab Results   Component Value Date    EGFR 44 02/28/2021    EGFR 44 02/27/2021    EGFR 45 02/26/2021    CREATININE 1 66 (H) 02/28/2021    CREATININE 1 66 (H) 02/27/2021    CREATININE 1 62 (H) 02/26/2021     · Baseline serum creatinine of 1 3-1 6 mg/dl  · Slight increase in the creatinine level while on the IV lasix drip/infusion  · Avoid all nephrotoxic agents  · Serial laboratory testing to monitor the patient's renal function and electrolytes  · Check post-void residuals    Cirrhosis of liver with ascites ([facility] )  Assessment & Plan  · The patient was seen in consultation by Gastroenterology  · Avoid all hepatotoxic agents  · Outpatient hepatocellular carcinoma surveillance and follow-up with Gastroenterology    US liver doppler only  Status: Final result   PACS Images     Show images for US liver doppler only   Study Result    ABDOMEN ULTRASOUND, COMPLETE WITH DOPPLER     INDICATION:    Cirrhosis with ascites         COMPARISON:  Compared with 10/2/2020     TECHNIQUE:   Real-time ultrasound of the abdomen was performed with a curvilinear transducer with Doppler evaluation of liver      FINDINGS:     The main portal vein and primary branch segments are patent and hepatopetal with normal spectral waveform  Peak systolic velocity at 32 cm/s  The right at 42 cm/s and the left at 59 cm/s      Hepatic veins are patent  Spectral waveforms within normal limits  Right hepatic vein at 57 cm/s, mid hepatic vein at 56 cm/s and left hepatic vein at 77 cm/s        Main hepatic artery appears normal size, patent with normal spectral waveform      Splenic vein is patent  56 cm/s      IMPRESSION:     Patent portal vein and hepatic veins  Pleural effusion on right  Assessment & Plan  · Recurrent right-sided pleural effusion  · Successful right-sided thoracentesis by [facility] on 02/23/2021, which yielded 1600 ml of pleural fluid  · Increased to lasix gtt @ 20 mg/hr on 02/28/2021  · Appears to be a transudative pleural effusion by fluid analysis  · The patient was seen in consultation by Pulmonology  STEFF (obstructive sleep apnea)  Assessment & Plan  · Continue BIPAP at 17/11 QHS and anytime while sleeping      Subjective:   Patient seen and examined at bedside  OOB in chair  Patient lost 2 lbs since yesterday  Will increase Lasix gtt from 10 to 20 cc/hr today  Patient has no acute complaints at this time  VSS  Objective:     Vitals: Blood pressure 111/59, pulse 63, temperature 98 3 °F (36 8 °C), resp  rate 18, height 5' 11" (1 803 m), weight (!) 161 kg (355 lb 9 6 oz), SpO2 96 %  ,Body mass index is 49 6 kg/m²  Wt Readings from Last 3 Encounters:   02/28/21 (!) 161 kg (355 lb 9 6 oz)   01/13/21 (!) 159 kg (351 lb 6 4 oz)   01/12/21 (!) 161 kg (356 lb)       Intake/Output Summary (Last 24 hours) at 2/28/2021 1231  Last data filed at 2/28/2021 0933  Gross per 24 hour   Intake 420 ml   Output 1550 ml   Net -1130 ml       Physical Exam:     Physical Exam  Vitals signs reviewed     Constitutional:       General: He is not in acute distress  HENT:      Head: Normocephalic  Nose: No congestion or rhinorrhea  Eyes:      General: No scleral icterus  Right eye: No discharge  Left eye: No discharge  Cardiovascular:      Rate and Rhythm: Normal rate  Heart sounds: No murmur  Pulmonary:      Effort: Pulmonary effort is normal       Comments: Improving crackles on right lung fields  CTA on left posterior fields  Abdominal:      General: Bowel sounds are normal       Tenderness: There is no abdominal tenderness  Comments: Obese abdomen   Musculoskeletal:      Right lower leg: Edema present  Left lower leg: Edema present  Comments: Chronic venous stasis change in bilateral lower extremity shin  B/l LE edema improving slightly   Skin:     General: Skin is warm  Neurological:      Mental Status: He is alert and oriented to person, place, and time     Psychiatric:         Mood and Affect: Mood normal          Behavior: Behavior normal          Recent Results (from the past 24 hour(s))   Fingerstick Glucose (POCT)    Collection Time: 02/27/21  3:34 PM   Result Value Ref Range    POC Glucose 129 65 - 140 mg/dl   Fingerstick Glucose (POCT)    Collection Time: 02/27/21  9:37 PM   Result Value Ref Range    POC Glucose 117 65 - 140 mg/dl   Protime-INR    Collection Time: 02/28/21  4:54 AM   Result Value Ref Range    Protime 26 7 (H) 11 6 - 14 5 seconds    INR 2 53 (H) 0 84 - 1 19   CBC and differential    Collection Time: 02/28/21  4:54 AM   Result Value Ref Range    WBC 5 75 4 31 - 10 16 Thousand/uL    RBC 3 10 (L) 3 88 - 5 62 Million/uL    Hemoglobin 8 4 (L) 12 0 - 17 0 g/dL    Hematocrit 28 4 (L) 36 5 - 49 3 %    MCV 92 82 - 98 fL    MCH 27 1 26 8 - 34 3 pg    MCHC 29 6 (L) 31 4 - 37 4 g/dL    RDW 17 2 (H) 11 6 - 15 1 %    MPV 11 1 8 9 - 12 7 fL    Platelets 897 934 - 384 Thousands/uL    nRBC 0 /100 WBCs    Neutrophils Relative 75 43 - 75 %    Immat GRANS % 0 0 - 2 %    Lymphocytes Relative 12 (L) 14 - 44 % Monocytes Relative 8 4 - 12 %    Eosinophils Relative 4 0 - 6 %    Basophils Relative 1 0 - 1 %    Neutrophils Absolute 4 39 1 85 - 7 62 Thousands/µL    Immature Grans Absolute 0 02 0 00 - 0 20 Thousand/uL    Lymphocytes Absolute 0 66 0 60 - 4 47 Thousands/µL    Monocytes Absolute 0 43 0 17 - 1 22 Thousand/µL    Eosinophils Absolute 0 20 0 00 - 0 61 Thousand/µL    Basophils Absolute 0 05 0 00 - 0 10 Thousands/µL   Comprehensive metabolic panel    Collection Time: 02/28/21  4:54 AM   Result Value Ref Range    Sodium 139 136 - 145 mmol/L    Potassium 3 4 (L) 3 5 - 5 3 mmol/L    Chloride 101 100 - 108 mmol/L    CO2 31 21 - 32 mmol/L    ANION GAP 7 4 - 13 mmol/L    BUN 27 (H) 5 - 25 mg/dL    Creatinine 1 66 (H) 0 60 - 1 30 mg/dL    Glucose 123 65 - 140 mg/dL    Calcium 8 6 8 3 - 10 1 mg/dL    Corrected Calcium 9 9 8 3 - 10 1 mg/dL    AST 21 5 - 45 U/L    ALT 19 12 - 78 U/L    Alkaline Phosphatase 120 (H) 46 - 116 U/L    Total Protein 7 1 6 4 - 8 2 g/dL    Albumin 2 4 (L) 3 5 - 5 0 g/dL    Total Bilirubin 0 70 0 20 - 1 00 mg/dL    eGFR 44 ml/min/1 73sq m   Magnesium    Collection Time: 02/28/21  4:54 AM   Result Value Ref Range    Magnesium 2 1 1 6 - 2 6 mg/dL   Phosphorus    Collection Time: 02/28/21  4:54 AM   Result Value Ref Range    Phosphorus 3 7 2 3 - 4 1 mg/dL   Fingerstick Glucose (POCT)    Collection Time: 02/28/21  7:47 AM   Result Value Ref Range    POC Glucose 112 65 - 140 mg/dl   Fingerstick Glucose (POCT)    Collection Time: 02/28/21 11:13 AM   Result Value Ref Range    POC Glucose 196 (H) 65 - 140 mg/dl       Current Facility-Administered Medications   Medication Dose Route Frequency Provider Last Rate Last Admin    bisacodyl (DULCOLAX) EC tablet 10 mg  10 mg Oral After Jose Ritchie DO   10 mg at 02/27/21 1817    carvedilol (COREG) tablet 6 25 mg  6 25 mg Oral BID With Meals Afrhat Aye, DO   6 25 mg at 02/28/21 0901    cholecalciferol (VITAMIN D3) tablet 2,000 Units  2,000 Units Oral Daily [facility], DO   2,000 Units at 02/28/21 5843    digoxin (LANOXIN) tablet 250 mcg  250 mcg Oral Daily [facility], DO   250 mcg at 02/28/21 3448    docusate sodium (COLACE) capsule 100 mg  100 mg Oral BID [facility], DO   100 mg at 02/28/21 0901    furosemide (LASIX) 500 mg infusion 50 mL  20 mg/hr Intravenous Continuous Sharlett Colace, DO 2 mL/hr at 02/28/21 1201 20 mg/hr at 02/28/21 1201    insulin lispro (HumaLOG) 100 units/mL subcutaneous injection 1-6 Units  1-6 Units Subcutaneous HS [facility], DO   1 Units at 02/23/21 2152    insulin lispro (HumaLOG) 100 units/mL subcutaneous injection 2-12 Units  2-12 Units Subcutaneous TID [facility], DO   2 Units at 02/28/21 1158    metolazone (ZAROXOLYN) tablet 2 5 mg  2 5 mg Oral Once Sharlett Colace, DO        nicotine (NICODERM CQ) 7 mg/24hr TD 24 hr patch 1 patch  1 patch Transdermal Daily [facility], DO        oxyCODONE (ROXICODONE) IR tablet 5 mg  5 mg Oral Q4H PRN [facility], DO   5 mg at 02/27/21 1817    pantoprazole (PROTONIX) EC tablet 40 mg  40 mg Oral Early Morning [facility], DO   40 mg at 02/28/21 0523    polyvinyl alcohol (LIQUIFILM TEARS) 1 4 % ophthalmic solution 2 drop  2 drop Both Eyes Q3H PRN [facility], DO   2 drop at 02/26/21 1709    potassium chloride (K-DUR,KLOR-CON) CR tablet 40 mEq  40 mEq Oral TID [facility], DO   40 mEq at 02/28/21 1159    warfarin (COUMADIN) tablet 5 mg  5 mg Oral Daily (warfarin) [facility], DO   5 mg at 02/27/21 1817       Invasive Devices     Peripheral Intravenous Line            Peripheral IV 02/27/21 Distal;Right;Ventral (anterior) Forearm 1 day                Lab, Imaging and other studies: I have personally reviewed pertinent reports      VTE Pharmacologic Prophylaxis: Warfarin (Coumadin)  VTE Mechanical Prophylaxis: sequential compression device    Isatu Cunningham MD

## 2021-02-28 NOTE — PLAN OF CARE
Problem: Potential for Falls  Goal: Patient will remain free of falls  Description: INTERVENTIONS:  - Assess patient frequently for physical needs  -  Identify cognitive and physical deficits and behaviors that affect risk of falls    -  [place] fall precautions as indicated by assessment   - Educate patient/family on patient safety including physical limitations  - Instruct patient to call for assistance with activity based on assessment  - Modify environment to reduce risk of injury  - Consider OT/PT consult to assist with strengthening/mobility  Outcome: Progressing     Problem: CARDIOVASCULAR - ADULT  Goal: Maintains optimal cardiac output and hemodynamic stability  Description: INTERVENTIONS:  - Monitor I/O, vital signs and rhythm  - Monitor for S/S and trends of decreased cardiac output  - Administer and titrate ordered vasoactive medications to optimize hemodynamic stability  - Assess quality of pulses, skin color and temperature  - Assess for signs of decreased coronary artery perfusion  - Instruct patient to report change in severity of symptoms  Outcome: Progressing  Goal: Absence of cardiac dysrhythmias or at baseline rhythm  Description: INTERVENTIONS:  - Continuous cardiac monitoring, vital signs, obtain 12 lead EKG if ordered  - Administer antiarrhythmic and heart rate control medications as ordered  - Monitor electrolytes and administer replacement therapy as ordered  Outcome: Progressing     Problem: RESPIRATORY - ADULT  Goal: Achieves optimal ventilation and oxygenation  Description: INTERVENTIONS:  - Assess for changes in respiratory status  - Assess for changes in mentation and behavior  - Position to facilitate oxygenation and minimize respiratory effort  - Oxygen administered by appropriate delivery if ordered  - Initiate smoking cessation education as indicated  - Encourage broncho-pulmonary hygiene including cough, deep breathe, Incentive Spirometry  - Assess the need for suctioning and aspirate as needed  - Assess and instruct to report SOB or any respiratory difficulty  - Respiratory Therapy support as indicated  Outcome: Progressing     Problem: METABOLIC, FLUID AND ELECTROLYTES - ADULT  Goal: Electrolytes maintained within normal limits  Description: INTERVENTIONS:  - Monitor labs and assess patient for signs and symptoms of electrolyte imbalances  - Administer electrolyte replacement as ordered  - Monitor response to electrolyte replacements, including repeat lab results as appropriate  - Instruct patient on fluid and nutrition as appropriate  Outcome: Progressing  Goal: Fluid balance maintained  Description: INTERVENTIONS:  - Monitor labs   - Monitor I/O and WT  - Instruct patient on fluid and nutrition as appropriate  - Assess for signs & symptoms of volume excess or deficit  Outcome: Progressing  Goal: Glucose maintained within target range  Description: INTERVENTIONS:  - Monitor Blood Glucose as ordered  - Assess for signs and symptoms of hyperglycemia and hypoglycemia  - Administer ordered medications to maintain glucose within target range  - Assess nutritional intake and initiate nutrition service referral as needed  Outcome: Progressing     Problem: SAFETY ADULT  Goal: Patient will remain free of falls  Description: INTERVENTIONS:  - Assess patient frequently for physical needs  -  Identify cognitive and physical deficits and behaviors that affect risk of falls    -  [place] fall precautions as indicated by assessment   - Educate patient/family on patient safety including physical limitations  - Instruct patient to call for assistance with activity based on assessment  - Modify environment to reduce risk of injury  - Consider OT/PT consult to assist with strengthening/mobility  Outcome: Progressing  Goal: Maintain or return to baseline ADL function  Description: INTERVENTIONS:  -  Assess patient's ability to carry out ADLs; assess patient's baseline for ADL function and identify physical deficits which impact ability to perform ADLs (bathing, care of mouth/teeth, toileting, grooming, dressing, etc )  - Assess/evaluate cause of self-care deficits   - Assess range of motion  - Assess patient's mobility; develop plan if impaired  - Assess patient's need for assistive devices and provide as appropriate  - Encourage maximum independence but intervene and supervise when necessary  - Involve family in performance of ADLs  - Assess for home care needs following discharge   - Consider OT consult to assist with ADL evaluation and planning for discharge  - Provide patient education as appropriate  Outcome: Progressing  Goal: Maintain or return mobility status to optimal level  Description: INTERVENTIONS:  - Assess patient's baseline mobility status (ambulation, transfers, stairs, etc )    - Identify cognitive and physical deficits and behaviors that affect mobility  - Identify mobility aids required to assist with transfers and/or ambulation (gait belt, sit-to-stand, lift, walker, cane, etc )  - [place] fall precautions as indicated by assessment  - Record patient progress and toleration of activity level on Mobility SBAR; progress patient to next Phase/Stage  - Instruct patient to call for assistance with activity based on assessment  - Consider rehabilitation consult to assist with strengthening/weightbearing, etc   Outcome: Progressing     Problem: DISCHARGE PLANNING  Goal: Discharge to home or other facility with appropriate resources  Description: INTERVENTIONS:  - Identify barriers to discharge w/patient and caregiver  - Arrange for needed discharge resources and transportation as appropriate  - Identify discharge learning needs (meds, wound care, etc )  - Refer to Case Management Department for coordinating discharge planning if the patient needs post-hospital services based on physician/advanced practitioner order or complex needs related to functional status, cognitive ability, or social support system  Outcome: Progressing     Problem: Knowledge Deficit  Goal: Patient/family/caregiver demonstrates understanding of disease process, treatment plan, medications, and discharge instructions  Description: Complete learning assessment and assess knowledge base  Interventions:  - Provide teaching at level of understanding  - Provide teaching via preferred learning methods  Outcome: Progressing     Problem: Nutrition/Hydration-ADULT  Goal: Nutrient/Hydration intake appropriate for improving, restoring or maintaining nutritional needs  Description: Monitor and assess patient's nutrition/hydration status for malnutrition  Collaborate with interdisciplinary team and initiate plan and interventions as ordered  Monitor patient's weight and dietary intake as ordered or per policy  Utilize nutrition screening tool and intervene as necessary  Determine patient's food preferences and provide high-protein, high-caloric foods as appropriate       INTERVENTIONS:  - Monitor oral intake, urinary output, labs, and treatment plans  - Assess nutrition and hydration status and recommend course of action  - Evaluate amount of meals eaten  - Assist patient with eating if necessary   - Allow adequate time for meals  - Recommend/ encourage appropriate diets, oral nutritional supplements, and vitamin/mineral supplements  - Order, calculate, and assess calorie counts as needed  - Recommend, monitor, and adjust tube feedings and TPN/PPN based on assessed needs  - Assess need for intravenous fluids  - Provide specific nutrition/hydration education as appropriate  - Include patient/family/caregiver in decisions related to nutrition  Outcome: Progressing

## 2021-02-28 NOTE — ASSESSMENT & PLAN NOTE
Lab Results   Component Value Date    EGFR 44 02/28/2021    EGFR 44 02/27/2021    EGFR 45 02/26/2021    CREATININE 1 66 (H) 02/28/2021    CREATININE 1 66 (H) 02/27/2021    CREATININE 1 62 (H) 02/26/2021     · Baseline serum creatinine of 1 3-1 6 mg/dl  · Slight increase in the creatinine level while on the IV lasix drip/infusion  · Avoid all nephrotoxic agents  · Serial laboratory testing to monitor the patient's renal function and electrolytes  · Check post-void residuals

## 2021-02-28 NOTE — CONSULTS
Consultation - Nephrology   Elizabeth Carrillo 57 y o  male MRN: [id number]  Unit/Bed#: 410-01 Encounter: [id number]      A/P:  1  Acute kidney injury on top chronic kidney disease   Continue with aggressive diuresis, continue to monitor electrolytes as well as kidney function  Continue avoid potential nephrotoxins  With respect to the contrast exposure on the 22nd followed by increasing creatinine on the 24th, it is unclear that there is a connection and therefore contrast induced nephropathy is lower on the list of potential etiologies  Will likely, the patient's kidney function is slightly reduced due to aggressive diuresis, which is appropriate at this time  Would simply avoid any other potential contrast exposures now the patient is in acute kidney injury  Will check postvoid residuals as well  2  Chronic kidney disease stage 3 with baseline creatinine around 1 3 mg/dL  3  Hypertensive nephrosclerosis likely  4  Hypokalemia   Agree with aggressive potassium supplementation, magnesium level has also been appropriate  5  Acute on chronic diastolic congestive heart failure   Patient's weight was about 320 lb when we saw him in the office in September, current weight is 355 lb which represents a significant drop in weight since presentation, when he was 369 lb, however, remains far away from his previous baseline  Goal at this time is to increase aggressiveness with diuresis and remove 8-10 lb today while on the Lasix drip  Will increase Lasix drip up to 20 mg/hour, will also give 1 time dose of metolazone 2 5 mg  The issue here is more concerning regarding potassium supplementation, as the patient is on high-dose supplements already  6  Cirrhosis   Cirrhosis tends to complicate volume management, continue low-sodium diet, continue mild fluid restriction  Patient's serum albumin level is reduced down to about 2 5 g/dL     7  Right pleural effusion          Thank you for allowing us to participate in the care of your patient  Please feel free to contact us regarding the care of this patient, or any other questions/concerns that may be applicable  Patient Active Problem List   Diagnosis    Atrial fibrillation (HCC)    Chronic diastolic congestive heart failure ([facility] [address] )    Diabetes mellitus, type 2 (HCC)    Class 3 severe obesity due to excess calories with serious comorbidity and body mass index (BMI) of 45 0 to 49 9 in adult [facility])    Lymphedema    Long term (current) use of anticoagulants    Chronic venous insufficiency    Varicose veins of both lower extremities with inflammation    Hypertension    STEFF (obstructive sleep apnea)    Pleural effusion on right    SOB (shortness of breath)    Cirrhosis of liver with ascites (HCC)    CKD (chronic kidney disease) stage 3, GFR 30-59 ml/min    Hyperkalemia    Tobacco abuse    Chronic anemia    Thrombocytopenia (HCC)    Hypokalemia    Other cirrhosis of liver ([facility] [address] )    Encounter for screening for other viral diseases     Lactic acidosis    Permanent atrial fibrillation (HCC)    Diverticulosis of colon    Lesion of spleen    Type 2 diabetes mellitus with hyperglycemia, without long-term current use of insulin (HCC)    Acute on chronic diastolic CHF (congestive heart failure) (HCC)    Hypoxia    Tobacco use    Hyponatremia    Constipation    Pulmonary hypertension (HCC)    Vitamin D deficiency    Atelectasis    Anemia       History of Present Illness   Physician Requesting Consult: Dallas Geiger DO  Reason for Consult / Principal Problem:  Chronic kidney disease   Hx and PE limited by:   HPI: José Miguel Bowers is a 57y o  year old male who presented to the emergency department on February 22nd with complaints of shortness of breath which had been progressively worsening over the course of a month but much worse and last few days prior to presentation  Patient claims that he gained about 30 lb over that time    He had contacted a provider who recommended to increase the furosemide and take metolazone, but apparently he was unwilling to do this due to the bathroom facilities being on the 2nd floor and would be difficult for him to reach this  Patient does not appear to have a urinal that he can use on the 1st floor  Patient was started on a furosemide drip on the 25th of February after he was resistant to typical diuretic regimen  Since that time, he has had minimal improvement in weight, patient's kidney function which is slightly reduced from baseline continues to be essentially stable  Of note, presentation, the patient's kidney function was at baseline around 1 3 mg/dL, 2 days later on to read the 24th, this increased to 1 5 mg/dL, and since then it has been very slowly increasing up to 1 66 mg/dL this morning  Patient has had hypokalemia which is being addressed with aggressive supplementation currently on potassium chloride 40 mEq 3 times daily  Previous electronic medical records reviewed during the course this consultation  History obtained from chart review and the patient    Review of Systems - Negative except as mentioned above in HPI, more specifics as mentioned below    Review of Systems - General ROS: negative  Psychological ROS: negative  Ophthalmic ROS: negative  ENT ROS: negative  Allergy and Immunology ROS: negative  Hematological and Lymphatic ROS: negative  Endocrine ROS: negative  Respiratory ROS: no cough, shortness of breath, or wheezing  Cardiovascular ROS: no chest pain or dyspnea on exertion  Gastrointestinal ROS: negative  Genito-Urinary ROS: negative  Musculoskeletal ROS: negative  Neurological ROS: negative  Dermatological ROS: negative    Historical Information   Past Medical History:   Diagnosis Date    A-fib [facility])     Cardiac disease     Chronic combined systolic (congestive) and diastolic (congestive) heart failure (HCC)     Diabetes mellitus ([facility] [address] )     Dilated cardiomyopathy ([facility] [address] )     History of echocardiogram 11/24/2014    EF 0 30 (30%), Likely mod LV systolic dysfunction  Likely RV dysfunction as well   History of Lexiscan MPI 02/19/2016    EF 0 43 (43%), no prior MI or ischemia   Hx of echocardiogram 04/28/2017    Normal EF, Normal LV systolic function  Mild concentric LV hypertrophy  Mild mitral and tricuspid regurg   Hx: recurrent pneumonia     Hypertension     Long term (current) use of anticoagulants     Morbid (severe) obesity due to excess calories (HCC)     Neuropathy      Past Surgical History:   Procedure Laterality Date    HERNIA REPAIR      IR THORACENTESIS  8/25/2020    IR THORACENTESIS  2/23/2021    SPLENECTOMY, TOTAL      VASCULAR SURGERY Right     leg     Social History   Social History     Substance and Sexual Activity   Alcohol Use Yes    Alcohol/week: 14 0 standard drinks    Types: 5 Glasses of wine, 5 Cans of beer, 4 Shots of liquor per week    Frequency: 2-3 times a week    Drinks per session: 5 or 6    Binge frequency: Never    Comment: WEEKLY     Social History     Substance and Sexual Activity   Drug Use Never     Social History     Tobacco Use   Smoking Status Current Some Day Smoker    Packs/day: 0 25    Types: Cigarettes   Smokeless Tobacco Never Used   Tobacco Comment    2-3 CIGARETTES DAILY     No family history on file      Meds/Allergies   all current active meds have been reviewed, current meds:   Current Facility-Administered Medications   Medication Dose Route Frequency    bisacodyl (DULCOLAX) EC tablet 10 mg  10 mg Oral After Dinner    carvedilol (COREG) tablet 6 25 mg  6 25 mg Oral BID With Meals    cholecalciferol (VITAMIN D3) tablet 2,000 Units  2,000 Units Oral Daily    digoxin (LANOXIN) tablet 250 mcg  250 mcg Oral Daily    docusate sodium (COLACE) capsule 100 mg  100 mg Oral BID    furosemide (LASIX) 500 mg infusion 50 mL  10 mg/hr Intravenous Continuous    insulin lispro (HumaLOG) 100 units/mL subcutaneous injection 1-6 Units  1-6 Units Subcutaneous HS    insulin lispro (HumaLOG) 100 units/mL subcutaneous injection 2-12 Units  2-12 Units Subcutaneous TID AC    nicotine (NICODERM CQ) 7 mg/24hr TD 24 hr patch 1 patch  1 patch Transdermal Daily    oxyCODONE (ROXICODONE) IR tablet 5 mg  5 mg Oral Q4H PRN    pantoprazole (PROTONIX) EC tablet 40 mg  40 mg Oral Early Morning    polyvinyl alcohol (LIQUIFILM TEARS) 1 4 % ophthalmic solution 2 drop  2 drop Both Eyes Q3H PRN    potassium chloride (K-DUR,KLOR-CON) CR tablet 40 mEq  40 mEq Oral TID AC    warfarin (COUMADIN) tablet 5 mg  5 mg Oral Daily (warfarin)    and PTA meds:    Medications Prior to Admission   Medication    carvedilol (COREG) 6 25 mg tablet    digoxin (LANOXIN) 0 25 mg tablet    furosemide (LASIX) 40 mg tablet    metFORMIN (GLUCOPHAGE) 1000 MG tablet    metolazone (ZAROXOLYN) 5 mg tablet    omeprazole (PriLOSEC) 40 MG capsule    warfarin (COUMADIN) 5 mg tablet    zolpidem (AMBIEN) 10 mg tablet         No Known Allergies    Objective     Intake/Output Summary (Last 24 hours) at 2/28/2021 1154  Last data filed at 2/28/2021 0933  Gross per 24 hour   Intake 420 ml   Output 1550 ml   Net -1130 ml       Invasive Devices:        Physical Exam      I/O last 3 completed shifts: In: 840 [P O :840]  Out: 3200 [Urine:3200]    Vitals:    02/28/21 0903   BP: 111/59   Pulse: 63   Resp:    Temp:    SpO2: 96%       Gen: in NAD, Alert/Awake  HEENT: no sclerous icterus, MMM, neck supple  CV: +S1/S2, RRR  Lungs:  Reduced but otherwise clear  Abd: +BS, soft NT/ND  Ext: all four extremities are warm, +3 bilateral lower extremity edema  Skin: no rashes noted  Neuro: CN II-XII intact    Current Weight: Weight - Scale: (!) 161 kg (355 lb 9 6 oz)  First Weight: Weight - Scale: (!) 159 kg (350 lb)    Lab Results:  I have personally reviewed pertinent labs      CBC:   Lab Results   Component Value Date    WBC 5 75 02/28/2021    HGB 8 4 (L) 02/28/2021    HCT 28 4 (L) 02/28/2021    MCV 92 02/28/2021     02/28/2021    MCH 27 1 02/28/2021    MCHC 29 6 (L) 02/28/2021    RDW 17 2 (H) 02/28/2021    MPV 11 1 02/28/2021    NRBC 0 02/28/2021     CMP:   Lab Results   Component Value Date    K 3 4 (L) 02/28/2021     02/28/2021    CO2 31 02/28/2021    BUN 27 (H) 02/28/2021    CREATININE 1 66 (H) 02/28/2021    CALCIUM 8 6 02/28/2021    AST 21 02/28/2021    ALT 19 02/28/2021    ALKPHOS 120 (H) 02/28/2021    EGFR 44 02/28/2021     Phosphorus:   Lab Results   Component Value Date    PHOS 3 7 02/28/2021     Magnesium:   Lab Results   Component Value Date    MG 2 1 02/28/2021     Urinalysis: No results found for: Margaretha Higashi, SPECGRAV, PHUR, LEUKOCYTESUR, NITRITE, PROTEINUA, GLUCOSEU, KETONESU, BILIRUBINUR, BLOODU  Ionized Calcium: No results found for: CAION  Coagulation:   Lab Results   Component Value Date    INR 2 53 (H) 02/28/2021     Troponin: No results found for: TROPONINI  ABG: No results found for: PHART, LEX8DPN, PO2ART, XME2CAJ, U8CCJUIH, BEART, SOURCE    Results from last 7 days   Lab Units 02/28/21  0454 02/27/21  0638 02/26/21  0558 02/25/21  0528   POTASSIUM mmol/L 3 4* 3 8 3 5 4 0   CHLORIDE mmol/L 101 101 98* 98*   CO2 mmol/L 31 34* 33* 32   BUN mg/dL 27* 25 24 21   CREATININE mg/dL 1 66* 1 66* 1 62* 1 55*   CALCIUM mg/dL 8 6 8 5 8 6 8 5   ALK PHOS U/L 120*  --  127* 126*   ALT U/L 19  --  20 17   AST U/L 21  --  19 20       Radiology review:  No results found  Shirley Carbajal DO      This consultation note was produced in part using a dictation device which may document imprecise wording from author's original intent

## 2021-02-28 NOTE — ASSESSMENT & PLAN NOTE
· Likely hypervolemic hyponatremia  · Resolved with IV lasix treatment  · Increased lasix gtt @ 20 mg/hr on 02/28/2021  · Follow the sodium level    Results from last 7 days   Lab Units 02/28/21  0454 02/27/21  0638 02/26/21  0558   SODIUM mmol/L 139 141 139   POTASSIUM mmol/L 3 4* 3 8 3 5   CHLORIDE mmol/L 101 101 98*   CO2 mmol/L 31 34* 33*   BUN mg/dL 27* 25 24   CREATININE mg/dL 1 66* 1 66* 1 62*   CALCIUM mg/dL 8 6 8 5 8 6

## 2021-02-28 NOTE — ASSESSMENT & PLAN NOTE
· Check an iron panel, vitamin B12 level, and folate level  · Check the patient's stool for occult blood x 3 specimens  · Follow the CBC  · Transfuse for a hemoglobin less than 7 g/dl     Results from last 7 days   Lab Units 02/28/21  0454 02/27/21  0638 02/26/21  0558   WBC Thousand/uL 5 75 5 46 5 79   HEMOGLOBIN g/dL 8 4* 9 1* 9 1*   HEMATOCRIT % 28 4* 31 0* 31 3*   PLATELETS Thousands/uL 175 169 173

## 2021-02-28 NOTE — ASSESSMENT & PLAN NOTE
Wt Readings from Last 3 Encounters:   02/28/21 (!) 161 kg (355 lb 9 6 oz)   01/13/21 (!) 159 kg (351 lb 6 4 oz)   01/12/21 (!) 161 kg (356 lb)     · Troponin levels were within normal limits x 3 sets  · Transitioned to lasix gtt @ 20 mg/hr on 02/28/2021 with goal of losing 8lbs today per nephrology recommendation  · Metolazone 2 5 mg PO x1 today  · Continue coreg 6 25 mg PO BID  · The patient was seen in consultation by Cardiology    · Monitor the patient's renal function closely while receiving the lasix gtt treatment  · Daily weights  · Strict intake/output measurements  · PT/OT  · Nephrology recommendation appreciated

## 2021-02-28 NOTE — ASSESSMENT & PLAN NOTE
Lab Results   Component Value Date    HGBA1C 6 1 (H) 01/05/2021       Recent Labs     02/27/21  1534 02/27/21  2137 02/28/21  0747 02/28/21  1113   POCGLU 129 117 112 196*       Blood Sugar Average: Last 72 hrs:  (P) 129 [id number]     · Recently take off metformin as an outpatient secondary to his liver disease  · Not currently on any diabetic medications as an outpatient  · Consider the initiation of an ACE-inhibitor for renal protection in the setting of type 2 diabetes mellitus if his renal function remains stable  · Insulin sliding scale with blood glucose monitoring ACHS

## 2021-02-28 NOTE — ASSESSMENT & PLAN NOTE
· Recurrent right-sided pleural effusion  · Successful right-sided thoracentesis by [facility] on 02/23/2021, which yielded 1600 ml of pleural fluid  · Increased to lasix gtt @ 20 mg/hr on 02/28/2021  · Appears to be a transudative pleural effusion by fluid analysis  · The patient was seen in consultation by Pulmonology

## 2021-03-01 NOTE — PROGRESS NOTES
NEPHROLOGY PROGRESS NOTE   Esau Kellogg 57 y o  male MRN: [id number]  Unit/Bed#: 410-01 Encounter: [id number]    Assessment/Plan:    In summary this is a 56 yo man whose pertinent medical problems include CKD 3, CdHF, cirrhosis, admitted 2/22/21 for progressive worsening dyspnea x 1 month and 30 pound weight gain  Initiated on lasix infusion 2/25 with excellent diuresis  1  Acute Kidney Injury atop Chronic Kidney Disease  · Presented with a sCr of 1 3 mg/dL -> 1 66 mg/dL day of consult -> 1 6 mg/dL today  Etiology renovascular congestion in setting of acute CdHF +/- ERICH (exposure 2/22)  · Continue aggressive diuresis- see plan below  Avoid nephrotoxins, maximize hemodynamics  Will check bladder scan and follow the urinary retention protocol  Check urine chems  2  Stage 3 Chronic Kidney Disease with baseline creatinine around 1 3 mg/dL  3  Probable hypertensive nephrosclerosis   · Blood pressure is appropriate  Not on ace/arb  4  Acute on Chronic Diastolic Congestive Heart Failure  · Continue aggressive lasix infusion  7 kg loss noted  Will defer metolazone for today and attempt to correct hypokalemia  Continue daily weight, strict IO, sodium avoidance, fluid restriction  Appreciate cardiology's recommendations  5  Hypokalemia  · Will provide patient with 80 mEq oral potassium today  6  Cirrhosis with ascites  · Continue to diurese as above  Appreciate GI's recommendations  7  Right pleural effusion  · Management per pulmonology    ROS  States he is sleepy  A complete 10 point review of systems have been performed and are otherwise negative  Historical Information   Past Medical History:   Diagnosis Date    A-fib [facility])     Cardiac disease     Chronic combined systolic (congestive) and diastolic (congestive) heart failure (HCC)     Diabetes mellitus ([facility] [address] )     Dilated cardiomyopathy ([facility] [address] )     History of echocardiogram 11/24/2014    EF 0 30 (30%), Likely mod LV systolic dysfunction   Likely RV dysfunction as well   History of Lexiscan MPI 02/19/2016    EF 0 43 (43%), no prior MI or ischemia   Hx of echocardiogram 04/28/2017    Normal EF, Normal LV systolic function  Mild concentric LV hypertrophy  Mild mitral and tricuspid regurg   Hx: recurrent pneumonia     Hypertension     Long term (current) use of anticoagulants     Morbid (severe) obesity due to excess calories (HCC)     Neuropathy      Past Surgical History:   Procedure Laterality Date    HERNIA REPAIR      IR THORACENTESIS  8/25/2020    IR THORACENTESIS  2/23/2021    SPLENECTOMY, TOTAL      VASCULAR SURGERY Right     leg     Social History   Social History     Substance and Sexual Activity   Alcohol Use Yes    Alcohol/week: 14 0 standard drinks    Types: 5 Glasses of wine, 5 Cans of beer, 4 Shots of liquor per week    Frequency: 2-3 times a week    Drinks per session: 5 or 6    Binge frequency: Never    Comment: WEEKLY     Social History     Substance and Sexual Activity   Drug Use Never     Social History     Tobacco Use   Smoking Status Current Some Day Smoker    Packs/day: 0 25    Types: Cigarettes   Smokeless Tobacco Never Used   Tobacco Comment    2-3 CIGARETTES DAILY       Family History:   No family history on file      Medications:  Pertinent medications were reviewed  Current Facility-Administered Medications   Medication Dose Route Frequency Provider Last Rate    bisacodyl  10 mg Oral After Jose Ritchie DO      carvedilol  6 25 mg Oral BID With Meals Dipak Bartlett DO      cholecalciferol  2,000 Units Oral Daily Dipak Bartlett DO      digoxin  250 mcg Oral Daily Dipak Bartlett DO      docusate sodium  100 mg Oral BID Dipak Bartlett DO      [START ON 3/2/2021] ferrous sulfate  325 mg Oral Daily With Breakfast Isatu Larose MD      furosemide  20 mg/hr Intravenous Continuous Camilo Arthur DO 20 mg/hr (03/01/21 1329)    insulin lispro  1-6 Units Subcutaneous HS Ivone Roa Howard,       insulin lispro  2-12 Units Subcutaneous TID AC Jennifer Boyle, DO      nicotine  1 patch Transdermal Daily Jennifer Boyle, DO      oxyCODONE  5 mg Oral Q4H PRN Jennifer Boyle, DO      pantoprazole  40 mg Oral Early Morning Jennifer Boyle, DO      polyvinyl alcohol  2 drop Both Eyes Q3H PRN Jennifer Boyle, DO      potassium chloride  20 mEq Oral BID With Meals Gutierrez Hale MD      warfarin  5 mg Oral Daily (warfarin) Jennifer Boyle, DO           No Known Allergies      Vitals:   /67   Pulse 61   Temp 98 6 °F (37 °C)   Resp 18   Ht 5' 11" (1 803 m)   Wt (!) 156 kg (344 lb 12 8 oz)   SpO2 93%   BMI 48 09 kg/m²   Body mass index is 48 09 kg/m²  SpO2: 93 %,   SpO2 Activity: At Rest,   O2 Device: None (Room air)      Intake/Output Summary (Last 24 hours) at 3/1/2021 1354  Last data filed at 3/1/2021 1331  Gross per 24 hour   Intake 1925 73 ml   Output 8700 ml   Net -6774 27 ml     Invasive Devices     Peripheral Intravenous Line            Peripheral IV 02/27/21 Distal;Right;Ventral (anterior) Forearm 2 days                Physical Exam  General: conscious, cooperative, in no acute distress, chronically ill appearing, generalized anasarca   Eyes: conjunctivae pink, anicteric sclerae  ENT: lips and mucous membranes moist  Neck: supple, no JVD, no masses  Chest: diminished breath sounds bilateral, no crackles, ronchus or wheezings  CVS: S1 & S2, normal rate, regular rhythm  Abdomen: soft, non-tender, non-distended, normoactive bowel sounds  Extremities: severe edema of both legs  Skin: no rash  Neuro: awake, alert, oriented      Diagnostic Data:  Lab: I have personally reviewed pertinent lab results  ,   CBC:  Results from last 7 days   Lab Units 03/01/21  0626   WBC Thousand/uL 5 81   HEMOGLOBIN g/dL 9 2*   HEMATOCRIT % 32 1*   PLATELETS Thousands/uL 187      CMP:   Lab Results   Component Value Date    SODIUM 139 03/01/2021    K 3 4 (L) 03/01/2021     03/01/2021    CO2 34 (H) 03/01/2021    BUN 27 (H) 03/01/2021    CREATININE 1 59 (H) 03/01/2021    CALCIUM 9 2 03/01/2021    AST 23 03/01/2021    ALT 20 03/01/2021    ALKPHOS 126 (H) 03/01/2021    EGFR 46 03/01/2021   ,   PT/INR:   Lab Results   Component Value Date    INR 2 16 (H) 03/01/2021   ,   Magnesium: No components found for: MAG,  Phosphorous:   Lab Results   Component Value Date    PHOS 3 8 03/01/2021       Microbiology:  @LABNT,(urinecx:7)@        TRES Liu    Portions of the record may have been created with voice recognition software  Occasional wrong word or "sound a like" substitutions may have occurred due to the inherent limitations of voice recognition software  Read the chart carefully and recognize, using context, where substitutions have occurred

## 2021-03-01 NOTE — ASSESSMENT & PLAN NOTE
Lab Results   Component Value Date    EGFR 46 03/01/2021    EGFR 44 02/28/2021    EGFR 44 02/27/2021    CREATININE 1 59 (H) 03/01/2021    CREATININE 1 66 (H) 02/28/2021    CREATININE 1 66 (H) 02/27/2021     · Baseline serum creatinine of 1 3-1 6 mg/dl  · Slight increase in the creatinine level while on the IV lasix drip/infusion  · Avoid all nephrotoxic agents  · Serial laboratory testing to monitor the patient's renal function and electrolytes  · Check post-void residuals

## 2021-03-01 NOTE — PHYSICAL THERAPY NOTE
PHYSICAL THERAPY NOTE          Patient Name: Tiffanie RAMÍREZ Date: 3/1/2021     03/01/21 1017   Note Type   Note Type Treatment   Restrictions/Precautions   Other Precautions Chair Alarm; Bed Alarm; Fall Risk;Pain   Cognition   Overall Cognitive Status WFL   Arousal/Participation Alert; Cooperative   Subjective   Subjective I'm very tired   Transfers   Sit to Stand 5  Supervision   Additional items Armrests   Stand to Sit 5  Supervision   Additional items Armrests   Stand pivot 5  Supervision   Ambulation/Elevation   Gait pattern Short stride;Decreased foot clearance   Gait Assistance 5  Supervision   Distance 200'   Balance   Ambulatory Fair   Endurance Deficit   Endurance Deficit Yes   Activity Tolerance   Activity Tolerance Patient limited by fatigue   Assessment   Prognosis Good   Problem List Decreased strength;Decreased endurance; Impaired balance;Decreased mobility;Obesity   Assessment Pt  seen for PT treatment session this date with interventions consisting of  transfers and  gait training w/ emphasis on improving pt's ability to ambulate  Pt  Currently performing  tx and ambulation at (SUP ) x 1 level of function   Utilizing IV pole  with fair balance and stability  The patient's AM-PAC Basic Mobility Inpatient Short Form Raw Score is 23, Standardized Score is 50  88  A standardized score greater than 42 9 suggests the patient may benefit from discharge to home  Please also refer to physical therapy recommendation for safe DC planning   In comparison to previous session, Pt  With improvements in activity tolerance as he is tolerating increased distance well     Pt is in need of continued activity in PT to improve strength balance endurance mobility transfers and ambulation with return to maximize LOF   From PT/mobility standpoint, recommendation at time of d/c would be home PT in order to promote return to [facility] and independence  Goals   LTG Expiration Date 03/09/21   PT Treatment Day 4   Plan   Treatment/Interventions Functional transfer training;LE strengthening/ROM; Therapeutic exercise; Endurance training;Bed mobility;Gait training   Progress Progressing toward goals   Recommendation   PT Discharge Recommendation Home with skilled therapy   AM-PAC Basic Mobility Inpatient   Turning in Bed Without Bedrails 4   Lying on Back to Sitting on Edge of Flat Bed 4   Moving Bed to Chair 4   Standing Up From Chair 4   Walk in Room 4   Climb 3-5 Stairs 3   Basic Mobility Inpatient Raw Score 23   Basic Mobility Standardized Score 50 88   Pt  OOB in chair  with call bell within reach  at end of PT session  Discussed with  PT today's treatment and patient's current level of function for care coordination

## 2021-03-01 NOTE — ASSESSMENT & PLAN NOTE
· Continue PO coreg and PO digoxin  · Continue anticoagulation with PO coumadin for a goal INR of 2-3  · Outpatient follow-up with Cardiology    Results for Yuniel Johnson (MRN [id number]) as of 2/24/2021 12:39   Ref   Range 2/23/2021 05:27   DIGOXIN LEVEL Latest Ref Range: 0 8 - 2 0 ng/mL 1 7

## 2021-03-01 NOTE — ASSESSMENT & PLAN NOTE
K+ of 3 4, will initiate potassium chloride supplement 20 mEq Po BID while patient is on Lasix drip  Continue to monitor BMP and Mg

## 2021-03-01 NOTE — PROGRESS NOTES
Cardiology Progress Note - Brittany Hensley 57 y o  male MRN: [id number]    Unit/Bed#: 410-01 Encounter: [id number]    Assessment/Plan:  1  Acute on chronic diastolic congestive heart failure              - has been responding well to lasix gtt, dose was increased to 20 mg/hr yesterday by nephrology and metolazone 2 5 mg x 1 dose was given   - patient had a urinary output of >7 L yesterday and weight is down 11 lbs   - creatinine is stable   - weight today 344 lbs, dry weight 320 lbs  - would continue lasix gtt at current dose, hold off on further metolazone today unless otherwise recommended by nephrology   - continue daily weights, low sodium diet, fluid restriction, strict I/O   - monitor daily BMP     2  Chronic atrial fibrillation              - remains in atrial fibrillation with controlled rates on telemetry - has been bradycardic during sleeping hours               - continue carvedilol and digoxin              - on anticoagulation with coumadin for goal INR 2-3     3  Hypertension               - well controlled      4  Obstructive sleep apnea              - reports compliance with CPAP    Subjective:   Patient seen and examined  Complains of fatigue  Denies chest pain  Continues to have some HOLDEN, states this is chronic    Review of Systems   Constitution: Negative for chills and fever  HENT: Negative  Cardiovascular: Positive for dyspnea on exertion and leg swelling  Negative for chest pain, near-syncope, orthopnea, palpitations, paroxysmal nocturnal dyspnea and syncope  Respiratory: Negative for cough and shortness of breath  Gastrointestinal: Positive for bloating  Negative for diarrhea, nausea and vomiting  Genitourinary: Negative  Neurological: Negative for dizziness and light-headedness  All other systems reviewed and are negative  Objective:   Vitals: Blood pressure 112/67, pulse 61, temperature 98 6 °F (37 °C), resp   rate 18, height 5' 11" (1 803 m), weight (!) 156 kg (344 lb 12 8 oz), SpO2 93 %  , Body mass index is 48 09 kg/m² ,   Orthostatic Blood Pressures      Most Recent Value   Blood Pressure  112/67 filed at 2021 5242   Patient Position - Orthostatic VS  Sitting filed at 2021 4484         Systolic (55IKR), ZU , Min:109 , KIC:369     Diastolic (22BAR), BIB:67, Min:62, Max:69      Intake/Output Summary (Last 24 hours) at 3/1/2021 1113  Last data filed at 3/1/2021 0925  Gross per 24 hour   Intake 1205 73 ml   Output 7850 ml   Net -6644 27 ml     Weight (last 2 days)     Date/Time   Weight    21 0507   (!) 156 (344 8)    21 0552   (!) 161 (355 6)    21 0551   (!) 161 (355 6)    21 0503   (!) 161 (355 6)    21 0600   (!) 162 (357 81)                Telemetry Review: atrial fibrillation with slow ventricular response  EKG personally reviewed by Elijah Fuentes PA-C  Physical Exam  Vitals signs reviewed  Constitutional:       General: He is not in acute distress  Appearance: He is well-developed  He is obese  He is not diaphoretic  HENT:      Head: Normocephalic and atraumatic  Eyes:      Pupils: Pupils are equal, round, and reactive to light  Neck:      Musculoskeletal: Normal range of motion  Vascular: No carotid bruit  Cardiovascular:      Rate and Rhythm: Bradycardia present  Rhythm irregularly irregular  Pulses:           Radial pulses are 2+ on the right side and 2+ on the left side  Heart sounds: S1 normal and S2 normal  No murmur  Pulmonary:      Effort: Pulmonary effort is normal  No respiratory distress  Breath sounds: Examination of the right-middle field reveals rales  Rales present  No wheezing  Abdominal:      General: There is distension  Tenderness: There is no abdominal tenderness  Musculoskeletal: Normal range of motion  Right lower leg: Edema (+2 bilateral lower extremity edema) present  Left lower leg: Edema present  Skin:     General: Skin is warm and dry  Findings: No erythema  Neurological:      General: No focal deficit present  Mental Status: He is alert and oriented to person, place, and time     Psychiatric:         Behavior: Behavior normal            Laboratory Results:  Results from last 7 days   Lab Units 02/24/21  0637 02/23/21  0527 02/23/21  0525   CK TOTAL U/L  --  44  --    TROPONIN I ng/mL 0 03  --  0 03       CBC with diff:   Results from last 7 days   Lab Units 03/01/21  0626 02/28/21  0454 02/27/21  3708 02/26/21  0558 02/25/21  0528 02/24/21  0637 02/23/21  0814   WBC Thousand/uL 5 81 5 75 5 46 5 79 5 97 6 44 6 26   HEMOGLOBIN g/dL 9 2* 8 4* 9 1* 9 1* 9 3* 9 5* 9 0*   HEMATOCRIT % 32 1* 28 4* 31 0* 31 3* 31 6* 31 9* 29 5*   MCV fL 92 92 91 92 92 91 90   PLATELETS Thousands/uL 187 175 169 173 161 175 169   MCH pg 26 2* 27 1 26 8 26 8 27 0 27 1 27 5   MCHC g/dL 28 7* 29 6* 29 4* 29 1* 29 4* 29 8* 30 5*   RDW % 16 9* 17 2* 17 2* 17 1* 17 1* 17 0* 17 0*   MPV fL 11 4 11 1 10 3 11 1 10 4 10 7 10 5   NRBC AUTO /100 WBCs 0 0  --  0 0 0 0         CMP:  Results from last 7 days   Lab Units 03/01/21  0626 02/28/21  0454 02/27/21  0259 02/26/21  0558 02/25/21  0528 02/24/21  0637 02/23/21  0527   POTASSIUM mmol/L 3 4* 3 4* 3 8 3 5 4 0 3 6 4 1   CHLORIDE mmol/L 101 101 101 98* 98* 98* 97*   CO2 mmol/L 34* 31 34* 33* 32 35* 31   BUN mg/dL 27* 27* 25 24 21 21 20   CREATININE mg/dL 1 59* 1 66* 1 66* 1 62* 1 55* 1 50* 1 28   CALCIUM mg/dL 9 2 8 6 8 5 8 6 8 5 8 6 8 5   AST U/L 23 21  --  19 20 20 30   ALT U/L 20 19  --  20 17 20 16   ALK PHOS U/L 126* 120*  --  127* 126* 121* 123*   EGFR ml/min/1 73sq m 46 44 44 45 47 49 60         BMP:  Results from last 7 days   Lab Units 03/01/21  0626 02/28/21  0454 02/27/21  0090 02/26/21  0558 02/25/21  0528 02/24/21  0637 02/23/21  0527   POTASSIUM mmol/L 3 4* 3 4* 3 8 3 5 4 0 3 6 4 1   CHLORIDE mmol/L 101 101 101 98* 98* 98* 97*   CO2 mmol/L 34* 31 34* 33* 32 35* 31   BUN mg/dL 27* 27* 25 24 21 21 20   CREATININE mg/dL 1 59* 1 66* 1 66* 1 62* 1 55* 1 50* 1 28   CALCIUM mg/dL 9 2 8 6 8 5 8 6 8 5 8 6 8 5       BNP: No results for input(s): BNP in the last 72 hours  Magnesium:   Results from last 7 days   Lab Units 21  0626 21  0454 21  0558 21  0528 21  0637 21  0527   MAGNESIUM mg/dL 2 3 2 1 2 3 2 1 2 1 2 2       Coags:   Results from last 7 days   Lab Units 21  0626 21  0454 21  5282 21  0558 21  0528 21  0637 21  0814   INR  2 16* 2 53* 2 48* 2 32* 2 39* 2 40* 2 56*       TSH:        Hemoglobin A1C       Lipid Profile:       Cardiac testing:   Results for orders placed during the hospital encounter of 21   Echo complete with contrast if indicated    Narrative [address]  [address] [place] [address]  [phone]    Transthoracic Echocardiogram  2D, M-mode, Doppler, and Color Doppler    Study date:  2021    Patient: Philippe Menchaca  MR number: [id number]  Account number: [de-identified]  : 1960  Age: 57 years  Gender: Male  Status: Inpatient  Location: Bedside  Height: 71 in  Weight: 350 lb  BP: 130/ 80 mmHg    Indications: shortness of breath    Diagnoses: 428 0 - CONGESTIVE HEART FAILURE    Sonographer:  Uriel Montelongo, CCT  Primary Physician:  Elissa Pollack DO  Referring Physician:  Alessio Stein DO  Group:  [facility]  Interpreting Physician:  Lissette Woods DO    SUMMARY    LEFT VENTRICLE:  Systolic function was normal  Ejection fraction was estimated to be 60 %  There were no regional wall motion abnormalities  Wall thickness was mildly increased  RIGHT VENTRICLE:  The ventricle was moderately dilated  Systolic function was mildly reduced  LEFT ATRIUM:  The atrium was mildly dilated  RIGHT ATRIUM:  The atrium was moderately dilated  MITRAL VALVE:  There was mild regurgitation      TRICUSPID VALVE:  There was moderate regurgitation  Estimated peak PA pressure was 52 mmHg  HISTORY: PRIOR HISTORY: CHF, morbid obesity    PROCEDURE: The procedure was performed at the bedside  This was a routine study  The transthoracic approach was used  The study included complete 2D imaging, M-mode, complete spectral Doppler, and color Doppler  The heart rate was 80 bpm,  at the start of the study  Echocardiographic views were limited due to poor patient compliance and poor acoustic window availability  This was a technically difficult study  LEFT VENTRICLE: Size was normal  Systolic function was normal  Ejection fraction was estimated to be 60 %  There were no regional wall motion abnormalities  Wall thickness was mildly increased  DOPPLER: The study was not technically  sufficient to allow evaluation of LV diastolic function  RIGHT VENTRICLE: The ventricle was moderately dilated  Systolic function was mildly reduced  LEFT ATRIUM: The atrium was mildly dilated  RIGHT ATRIUM: The atrium was moderately dilated  MITRAL VALVE: Valve structure was normal  There was normal leaflet separation  DOPPLER: The transmitral velocity was within the normal range  There was no evidence for stenosis  There was mild regurgitation  AORTIC VALVE: The valve was trileaflet  Leaflets exhibited normal thickness and normal cuspal separation  DOPPLER: Transaortic velocity was within the normal range  There was no evidence for stenosis  There was no regurgitation  TRICUSPID VALVE: The valve structure was normal  There was normal leaflet separation  DOPPLER: The transtricuspid velocity was within the normal range  There was no evidence for stenosis  There was moderate regurgitation  Estimated peak PA  pressure was 52 mmHg  PULMONIC VALVE: Leaflets exhibited normal thickness, no calcification, and normal cuspal separation  DOPPLER: The transpulmonic velocity was within the normal range  There was no regurgitation      PERICARDIUM: There was no pericardial effusion  The pericardium was normal in appearance  AORTA: The root exhibited normal size  SYSTEMIC VEINS: IVC: The inferior vena cava was not well visualized  SYSTEM MEASUREMENT TABLES    2D  %FS: 31 45 %  Ao Diam: 3 22 cm  EDV(Teich): 148 86 ml  EF(Teich): 58 74 %  ESV(Teich): 61 42 ml  IVSd: 1 51 cm  LA Area: 20 63 cm2  LA Diam: 5 65 cm  LVIDd: 5 52 cm  LVIDs: 3 79 cm  LVPWd: 1 08 cm  RA Area: 33 86 cm2  RWT: 0 39  SV(Teich): 87 44 ml    CW  RAP: 0 mmHg  TR Vmax: 3 47 m/s  TR maxP 33 mmHg    PW  E' Sept: 0 09 m/s  E/E' Sept: 13 35  MV A Ti: 0 48 m/s  MV Dec [facility]: 4 5 m/s2  MV DecT: 253 15 ms  MV E Ti: 1 14 m/s  MV E/A Ratio: 2 36  RVSP: 48 33 mmHg    [facility]    Prepared and electronically signed by    Colt Ryan DO  Signed 2021 08:06:00       No results found for this or any previous visit  No results found for this or any previous visit  No results found for this or any previous visit      Meds/Allergies   all current active meds have been reviewed  Medications Prior to Admission   Medication    carvedilol (COREG) 6 25 mg tablet    digoxin (LANOXIN) 0 25 mg tablet    furosemide (LASIX) 40 mg tablet    metFORMIN (GLUCOPHAGE) 1000 MG tablet    metolazone (ZAROXOLYN) 5 mg tablet    omeprazole (PriLOSEC) 40 MG capsule    warfarin (COUMADIN) 5 mg tablet    zolpidem (AMBIEN) 10 mg tablet       furosemide, 20 mg/hr, Last Rate: 20 mg/hr (21 1201)      Assessment:  Principal Problem:    Acute on chronic diastolic CHF (congestive heart failure) (HCC)  Active Problems:    STEFF (obstructive sleep apnea)    Pleural effusion on right    Cirrhosis of liver with ascites (HCC)    CKD (chronic kidney disease) stage 3, GFR 30-59 ml/min    Hypokalemia    Lactic acidosis    Permanent atrial fibrillation (HCC)    Diverticulosis of colon    Lesion of spleen    Type 2 diabetes mellitus with hyperglycemia, without long-term current use of insulin (HCC)    Hypoxia    Tobacco use    Hyponatremia    Constipation    Pulmonary hypertension (HCC)    Vitamin D deficiency    Atelectasis    Anemia

## 2021-03-01 NOTE — PLAN OF CARE
Problem: PHYSICAL THERAPY ADULT  Goal: Performs mobility at highest level of function for planned discharge setting  See evaluation for individualized goals  Description: Treatment/Interventions: LE strengthening/ROM, Functional transfer training, Elevations, Therapeutic exercise, Endurance training, Gait training, Bed mobility          See flowsheet documentation for full assessment, interventions and recommendations  Outcome: Progressing  Note: Prognosis: Good  Problem List: Decreased strength, Decreased endurance, Impaired balance, Decreased mobility, Obesity  Assessment: Pt  seen for PT treatment session this date with interventions consisting of  transfers and  gait training w/ emphasis on improving pt's ability to ambulate  Pt  Currently performing  tx and ambulation at (SUP ) x 1 level of function  Utilizing IV pole  with fair balance and stability  The patient's AM-PAC Basic Mobility Inpatient Short Form Raw Score is 23, Standardized Score is 50  88  A standardized score greater than 42 9 suggests the patient may benefit from discharge to home  Please also refer to physical therapy recommendation for safe DC planning  In comparison to previous session, Pt  With improvements in activity tolerance as he is tolerating increased distance well  Pt is in need of continued activity in PT to improve strength balance endurance mobility transfers and ambulation with return to maximize LOF  From PT/mobility standpoint, recommendation at time of d/c would be home PT in order to promote return to PLOF and independence  PT Discharge Recommendation: Home with skilled therapy          See flowsheet documentation for full assessment

## 2021-03-01 NOTE — ASSESSMENT & PLAN NOTE
· Initiated on cholecalciferol 2000 I  U  PO Qdaily  · Recheck a vitamin D 25-OH level in 1-2 months with his PCP    Results for Ebony Narayan (MRN [id number]) as of 2/24/2021 12:28   Ref   Range 2/23/2021 05:26   Vit D, 25-Hydroxy Latest Ref Range: 30 0 - 100 0 ng/mL 13 1 (L)

## 2021-03-01 NOTE — TELEPHONE ENCOUNTER
Called pt second time to try and schedule hospital f/u, left message to call the office  ----- Message from [address] [place], [place] [address]JAVAD sent at 2/25/2021  8:56 AM EST -----  Can you get follow up for him in  4 weeks? Thanks!     [employer]

## 2021-03-01 NOTE — PROGRESS NOTES
Progress Note - Rupesh Zamora 1960, 57 y o  male MRN: [id number]    Unit/Bed#: 410-01 Encounter: [id number]    Primary Care Provider: Maria Elena Walters DO   Date and time admitted to hospital: 2/22/2021  9:27 AM        * Acute on chronic diastolic CHF (congestive heart failure) ([facility] [address] )  Assessment & Plan  Wt Readings from Last 3 Encounters:   03/01/21 (!) 156 kg (344 lb 12 8 oz)   01/13/21 (!) 159 kg (351 lb 6 4 oz)   01/12/21 (!) 161 kg (356 lb)     · Troponin levels were within normal limits x 3 sets  · Transitioned to lasix gtt @ 20 mg/hr on 02/28/2021, patient lost 11lbs since yesterday (355-->344lbs)  · Metolazone 2 5 mg PO x1 on 2/28/21  · Continue coreg 6 25 mg PO BID  · The patient was seen in consultation by Cardiology  · Monitor the patient's renal function closely while receiving the lasix gtt treatment  · Daily weights  · Strict intake/output measurements  · PT/OT  · Nephrology recommendation appreciated        Anemia  Assessment & Plan  · Check an iron panel, vitamin B12 level, and folate level  · Iron panel: decreased iron and low normal ferritin, will initiate ferrous sulfate PO daily  · Check the patient's stool for occult blood x 3 specimens  · Follow the CBC  · Transfuse for a hemoglobin less than 7 g/dl     Results from last 7 days   Lab Units 03/01/21  0626 02/28/21  0454 02/27/21  0638   WBC Thousand/uL 5 81 5 75 5 46   HEMOGLOBIN g/dL 9 2* 8 4* 9 1*   HEMATOCRIT % 32 1* 28 4* 31 0*   PLATELETS Thousands/uL 187 175 169         Atelectasis  Assessment & Plan  · Incentive spirometry 10 times per hour while awake    Vitamin D deficiency  Assessment & Plan  · Initiated on cholecalciferol 2000 I  U  PO Qdaily  · Recheck a vitamin D 25-OH level in 1-2 months with his PCP    Results for Ronan Martinez (MRN [id number]) as of 2/24/2021 12:28   Ref   Range 2/23/2021 05:26   Vit D, 25-Hydroxy Latest Ref Range: 30 0 - 100 0 ng/mL 13 1 (L)           Pulmonary hypertension (HCC)  Assessment & Plan  · Outpatient follow-up with Pulmonology    Constipation  Assessment & Plan  · Received miralax 17 grams PO x 1 dose on 02/24/2021  · The constipation has improved  · Continue colace 100 mg PO BID and dulcolax 10 mg PO Qdaily after dinner    Hyponatremia  Assessment & Plan  · Likely hypervolemic hyponatremia  · Resolved with IV lasix treatment  · Increased lasix gtt @ 20 mg/hr on 02/28/2021  · Follow the sodium level    Results from last 7 days   Lab Units 03/01/21  0626 02/28/21  0454 02/27/21  0638   SODIUM mmol/L 139 139 141   POTASSIUM mmol/L 3 4* 3 4* 3 8   CHLORIDE mmol/L 101 101 101   CO2 mmol/L 34* 31 34*   BUN mg/dL 27* 27* 25   CREATININE mg/dL 1 59* 1 66* 1 66*   CALCIUM mg/dL 9 2 8 6 8 5         Tobacco use  Assessment & Plan  · Nicotine patch  · Smoking cessation counseling    Hypoxia  Assessment & Plan  · Initially required 4 lpm of continuous supplemental oxygen via the nasal cannula to maintain oxygen saturations at 92% and above  · Successfully weaned off supplemental oxygen at this time  · Secondary to right-sided pleural effusion  · On the day of discharge, we will need to check an ambulatory pulse oximetry test to see if the patient qualifies for home supplemental oxygen    · Respiratory protocol  · Incentive spirometry 10 times per hour while awake    Type 2 diabetes mellitus with hyperglycemia, without long-term current use of insulin [facility])  Assessment & Plan  Lab Results   Component Value Date    HGBA1C 6 1 (H) 01/05/2021       Recent Labs     02/28/21  1113 02/28/21  1514 02/28/21  2056 03/01/21  0728   POCGLU 196* 141* 142* 116       Blood Sugar Average: Last 72 hrs:  (P) 123 [id number]     · Recently take off metformin as an outpatient secondary to his liver disease  · Not currently on any diabetic medications as an outpatient  · Consider the initiation of an ACE-inhibitor for renal protection in the setting of type 2 diabetes mellitus if his renal function remains stable  · Insulin sliding scale with blood glucose monitoring ACHS    Lesion of spleen  Assessment & Plan  · CT scan of the abdomen/pelvis (02/22/2021): Subcapsular cystic lesion in the spleen (probably related to old trauma)   Outpatient follow-up with PCP in regards to this matter    Diverticulosis of colon  Assessment & Plan  · Outpatient follow-up with Gastroenterology    Permanent atrial fibrillation ([facility] )  Assessment & Plan  · Continue PO coreg and PO digoxin  · Continue anticoagulation with PO coumadin for a goal INR of 2-3  · Outpatient follow-up with Cardiology    Results for Jonathan Echeverria (MRN [id number]) as of 2/24/2021 12:39   Ref  Range 2/23/2021 05:27   DIGOXIN LEVEL Latest Ref Range: 0 8 - 2 0 ng/mL 1 7       Lactic acidosis  Assessment & Plan  · Resolved with thiamine 200 mg IV x 1 dose on 02/22/2021    Hypokalemia  Assessment & Plan  K+ of 3 4, will initiate potassium chloride supplement 20 mEq Po BID while patient is on Lasix drip  Continue to monitor BMP and Mg    CKD (chronic kidney disease) stage 3, GFR 30-59 ml/min  Assessment & Plan  Lab Results   Component Value Date    EGFR 46 03/01/2021    EGFR 44 02/28/2021    EGFR 44 02/27/2021    CREATININE 1 59 (H) 03/01/2021    CREATININE 1 66 (H) 02/28/2021    CREATININE 1 66 (H) 02/27/2021     · Baseline serum creatinine of 1 3-1 6 mg/dl  · Slight increase in the creatinine level while on the IV lasix drip/infusion  · Avoid all nephrotoxic agents  · Serial laboratory testing to monitor the patient's renal function and electrolytes  · Check post-void residuals    Cirrhosis of liver with ascites ([facility] )  Assessment & Plan  · The patient was seen in consultation by Gastroenterology    · Avoid all hepatotoxic agents  · Outpatient hepatocellular carcinoma surveillance and follow-up with Gastroenterology    US liver doppler only  Status: Final result   PACS Images     Show images for US liver doppler only   Study Result    ABDOMEN ULTRASOUND, COMPLETE WITH DOPPLER     INDICATION:    Cirrhosis with ascites        COMPARISON:  Compared with 10/2/2020     TECHNIQUE:   Real-time ultrasound of the abdomen was performed with a curvilinear transducer with Doppler evaluation of liver      FINDINGS:     The main portal vein and primary branch segments are patent and hepatopetal with normal spectral waveform  Peak systolic velocity at 32 cm/s  The right at 42 cm/s and the left at 59 cm/s      Hepatic veins are patent  Spectral waveforms within normal limits  Right hepatic vein at 57 cm/s, mid hepatic vein at 56 cm/s and left hepatic vein at 77 cm/s        Main hepatic artery appears normal size, patent with normal spectral waveform      Splenic vein is patent  56 cm/s      IMPRESSION:     Patent portal vein and hepatic veins  Pleural effusion on right  Assessment & Plan  · Recurrent right-sided pleural effusion  · Successful right-sided thoracentesis by [facility] on 02/23/2021, which yielded 1600 ml of pleural fluid  · Increased to lasix gtt @ 20 mg/hr on 02/28/2021  · Appears to be a transudative pleural effusion by fluid analysis  · The patient was seen in consultation by Pulmonology  STEFF (obstructive sleep apnea)  Assessment & Plan  · Continue BIPAP at 17/11 QHS and anytime while sleeping      Subjective:   Patient seen and examined at bedside  OOB in chair  Patient lost 11 lbs since yesterday with Lasix gtt @ 20 cc/hr  Patient reports feeling slightly tired today  K+ 3 4 repleted with PO KCl  VSS  Objective:     Vitals: Blood pressure 112/67, pulse 61, temperature 98 6 °F (37 °C), resp  rate 18, height 5' 11" (1 803 m), weight (!) 156 kg (344 lb 12 8 oz), SpO2 93 %  ,Body mass index is 48 09 kg/m²    Wt Readings from Last 3 Encounters:   03/01/21 (!) 156 kg (344 lb 12 8 oz)   01/13/21 (!) 159 kg (351 lb 6 4 oz)   01/12/21 (!) 161 kg (356 lb)       Intake/Output Summary (Last 24 hours) at 3/1/2021 1002  Last data filed at 3/1/2021 9965  Gross per 24 hour   Intake 1205 73 ml   Output 7850 ml   Net -6644 27 ml       Physical Exam:    Physical Exam  Vitals signs reviewed  Constitutional:       General: He is not in acute distress  Appearance: He is not ill-appearing  HENT:      Head: Normocephalic  Nose: No congestion or rhinorrhea  Eyes:      General: No scleral icterus  Right eye: No discharge  Left eye: No discharge  Cardiovascular:      Rate and Rhythm: Normal rate  Heart sounds: No murmur  Pulmonary:      Effort: Pulmonary effort is normal  No respiratory distress  Comments: Currently on RA  No respiratory distress  CTA b/l with decreased breath sounds in RLL field  Abdominal:      General: Abdomen is flat  Bowel sounds are normal  There is no distension  Tenderness: There is no abdominal tenderness  Musculoskeletal:      Right lower leg: Edema present  Left lower leg: Edema present  Comments: 1+ pitting edema in b/l LE  Chronic venous stasis changes in lower extremities  Skin:     General: Skin is warm  Neurological:      Mental Status: He is alert and oriented to person, place, and time     Psychiatric:         Mood and Affect: Mood normal          Behavior: Behavior normal          Recent Results (from the past 24 hour(s))   Fingerstick Glucose (POCT)    Collection Time: 02/28/21 11:13 AM   Result Value Ref Range    POC Glucose 196 (H) 65 - 140 mg/dl   Fingerstick Glucose (POCT)    Collection Time: 02/28/21  3:14 PM   Result Value Ref Range    POC Glucose 141 (H) 65 - 140 mg/dl   Fingerstick Glucose (POCT)    Collection Time: 02/28/21  8:56 PM   Result Value Ref Range    POC Glucose 142 (H) 65 - 140 mg/dl   Occult blood x 3, stool    Collection Time: 02/28/21 11:19 PM   Result Value Ref Range    Occult Blood, Stool #1 Negative Negative    Occult Blood, Stool #2      Occult Blood, Stool #3      DATE SPECIMEN #1 02/28/2021     DATE SPECIMEN #2      DATE SPECIMEN #3 Protime-INR    Collection Time: 03/01/21  6:26 AM   Result Value Ref Range    Protime 23 6 (H) 11 6 - 14 5 seconds    INR 2 16 (H) 0 84 - 1 19   Comprehensive metabolic panel    Collection Time: 03/01/21  6:26 AM   Result Value Ref Range    Sodium 139 136 - 145 mmol/L    Potassium 3 4 (L) 3 5 - 5 3 mmol/L    Chloride 101 100 - 108 mmol/L    CO2 34 (H) 21 - 32 mmol/L    ANION GAP 4 4 - 13 mmol/L    BUN 27 (H) 5 - 25 mg/dL    Creatinine 1 59 (H) 0 60 - 1 30 mg/dL    Glucose 122 65 - 140 mg/dL    Calcium 9 2 8 3 - 10 1 mg/dL    Corrected Calcium 10 2 (H) 8 3 - 10 1 mg/dL    AST 23 5 - 45 U/L    ALT 20 12 - 78 U/L    Alkaline Phosphatase 126 (H) 46 - 116 U/L    Total Protein 7 5 6 4 - 8 2 g/dL    Albumin 2 7 (L) 3 5 - 5 0 g/dL    Total Bilirubin 0 80 0 20 - 1 00 mg/dL    eGFR 46 ml/min/1 73sq m   Magnesium    Collection Time: 03/01/21  6:26 AM   Result Value Ref Range    Magnesium 2 3 1 6 - 2 6 mg/dL   Phosphorus    Collection Time: 03/01/21  6:26 AM   Result Value Ref Range    Phosphorus 3 8 2 3 - 4 1 mg/dL   CBC and differential    Collection Time: 03/01/21  6:26 AM   Result Value Ref Range    WBC 5 81 4 31 - 10 16 Thousand/uL    RBC 3 51 (L) 3 88 - 5 62 Million/uL    Hemoglobin 9 2 (L) 12 0 - 17 0 g/dL    Hematocrit 32 1 (L) 36 5 - 49 3 %    MCV 92 82 - 98 fL    MCH 26 2 (L) 26 8 - 34 3 pg    MCHC 28 7 (L) 31 4 - 37 4 g/dL    RDW 16 9 (H) 11 6 - 15 1 %    MPV 11 4 8 9 - 12 7 fL    Platelets 323 580 - 507 Thousands/uL    nRBC 0 /100 WBCs    Neutrophils Relative 77 (H) 43 - 75 %    Immat GRANS % 0 0 - 2 %    Lymphocytes Relative 11 (L) 14 - 44 %    Monocytes Relative 7 4 - 12 %    Eosinophils Relative 4 0 - 6 %    Basophils Relative 1 0 - 1 %    Neutrophils Absolute 4 44 1 85 - 7 62 Thousands/µL    Immature Grans Absolute 0 02 0 00 - 0 20 Thousand/uL    Lymphocytes Absolute 0 62 0 60 - 4 47 Thousands/µL    Monocytes Absolute 0 43 0 17 - 1 22 Thousand/µL    Eosinophils Absolute 0 25 0 00 - 0 61 Thousand/µL Basophils Absolute 0 05 0 00 - 0 10 Thousands/µL   Fingerstick Glucose (POCT)    Collection Time: 03/01/21  7:28 AM   Result Value Ref Range    POC Glucose 116 65 - 140 mg/dl       Current Facility-Administered Medications   Medication Dose Route Frequency Provider Last Rate Last Admin    bisacodyl (DULCOLAX) EC tablet 10 mg  10 mg Oral After Jose Ritchie, DO   10 mg at 02/28/21 1714    carvedilol (COREG) tablet 6 25 mg  6 25 mg Oral BID With Meals [employer], DO   6 25 mg at 03/01/21 0842    cholecalciferol (VITAMIN D3) tablet 2,000 Units  2,000 Units Oral Daily [employer], DO   2,000 Units at 03/01/21 0841    digoxin (LANOXIN) tablet 250 mcg  250 mcg Oral Daily [employer], DO   250 mcg at 03/01/21 0841    docusate sodium (COLACE) capsule 100 mg  100 mg Oral BID [employer], DO   100 mg at 03/01/21 0841    [START ON 3/2/2021] ferrous sulfate tablet 325 mg  325 mg Oral Daily With Breakfast Isatu John MD        furosemide (LASIX) 500 mg infusion 50 mL  20 mg/hr Intravenous Continuous Shanice Homans, DO 2 mL/hr at 02/28/21 1201 20 mg/hr at 02/28/21 1201    insulin lispro (HumaLOG) 100 units/mL subcutaneous injection 1-6 Units  1-6 Units Subcutaneous HS [employer], DO   1 Units at 02/23/21 2152    insulin lispro (HumaLOG) 100 units/mL subcutaneous injection 2-12 Units  2-12 Units Subcutaneous TID 6225 Lynne Warsaw, DO   2 Units at 02/28/21 1158    nicotine (NICODERM CQ) 7 mg/24hr TD 24 hr patch 1 patch  1 patch Transdermal Daily [employer], DO        oxyCODONE (ROXICODONE) IR tablet 5 mg  5 mg Oral Q4H PRN [employer], DO   5 mg at 02/28/21 1718    pantoprazole (PROTONIX) EC tablet 40 mg  40 mg Oral Early Morning [employer], DO   40 mg at 03/01/21 0509    polyvinyl alcohol (LIQUIFILM TEARS) 1 4 % ophthalmic solution 2 drop  2 drop Both Eyes Q3H PRN [employer], DO   2 drop at 02/26/21 1700    potassium chloride (K-DUR,KLOR-CON) CR tablet 20 mEq  20 mEq Oral BID With Meals Sulaiman Phan MD   20 mEq at 03/01/21 0841    warfarin (COUMADIN) tablet 5 mg  5 mg Oral Daily (warfarin) Derik Johns DO   5 mg at 02/28/21 1714       Invasive Devices     Peripheral Intravenous Line            Peripheral IV 02/27/21 Distal;Right;Ventral (anterior) Forearm 2 days                Lab, Imaging and other studies: I have personally reviewed pertinent reports      VTE Pharmacologic Prophylaxis: Warfarin (Coumadin)  VTE Mechanical Prophylaxis: sequential compression device    Isatu Iyer MD

## 2021-03-01 NOTE — ASSESSMENT & PLAN NOTE
Lab Results   Component Value Date    HGBA1C 6 1 (H) 01/05/2021       Recent Labs     02/28/21  1113 02/28/21  1514 02/28/21 2056 03/01/21  0728   POCGLU 196* 141* 142* 116       Blood Sugar Average: Last 72 hrs:  (P) 537 [id number]     · Recently take off metformin as an outpatient secondary to his liver disease  · Not currently on any diabetic medications as an outpatient  · Consider the initiation of an ACE-inhibitor for renal protection in the setting of type 2 diabetes mellitus if his renal function remains stable  · Insulin sliding scale with blood glucose monitoring ACHS

## 2021-03-01 NOTE — ASSESSMENT & PLAN NOTE
Wt Readings from Last 3 Encounters:   03/01/21 (!) 156 kg (344 lb 12 8 oz)   01/13/21 (!) 159 kg (351 lb 6 4 oz)   01/12/21 (!) 161 kg (356 lb)     · Troponin levels were within normal limits x 3 sets  · Transitioned to lasix gtt @ 20 mg/hr on 02/28/2021, patient lost 11lbs since yesterday (355-->344lbs)  · Metolazone 2 5 mg PO x1 today  · Continue coreg 6 25 mg PO BID  · The patient was seen in consultation by Cardiology    · Monitor the patient's renal function closely while receiving the lasix gtt treatment  · Daily weights  · Strict intake/output measurements  · PT/OT  · Nephrology recommendation appreciated

## 2021-03-01 NOTE — ASSESSMENT & PLAN NOTE
· Likely hypervolemic hyponatremia  · Resolved with IV lasix treatment  · Increased lasix gtt @ 20 mg/hr on 02/28/2021  · Follow the sodium level    Results from last 7 days   Lab Units 03/01/21  0626 02/28/21  0454 02/27/21  0638   SODIUM mmol/L 139 139 141   POTASSIUM mmol/L 3 4* 3 4* 3 8   CHLORIDE mmol/L 101 101 101   CO2 mmol/L 34* 31 34*   BUN mg/dL 27* 27* 25   CREATININE mg/dL 1 59* 1 66* 1 66*   CALCIUM mg/dL 9 2 8 6 8 5

## 2021-03-02 NOTE — ASSESSMENT & PLAN NOTE
· Continue PO coreg and PO digoxin  · Continue anticoagulation with PO coumadin for a goal INR of 2-3  · Outpatient follow-up with Cardiology    Results for Leslie Peoples (MRN [id number]) as of 2/24/2021 12:39   Ref   Range 2/23/2021 05:27   DIGOXIN LEVEL Latest Ref Range: 0 8 - 2 0 ng/mL 1 7

## 2021-03-02 NOTE — PROGRESS NOTES
NEPHROLOGY PROGRESS NOTE   Rohit Black 57 y o  male MRN: [id number]  Unit/Bed#: 410-01 Encounter: [id number]    Assessment/Plan:    In summary, this is a 56 yo man whose pertinent medical problems include CKD 3, CdHF, cirrhosis admitted 2/22/21 for progressive worsening dyspnea x 1 month and significant weight gain  He is being treated with lasix infusion  1  Acute Kidney Injury atop Chronic Kidney Disease  ? Presented with a sCr of 1 3 mg/dL -> 1 66 mg/dL day of consult and relatively unchanged today  Etiology of renal dysfunction most likely renovascular congestion/decongestion although there was contrast exposure on 2/22/21  Urine chemistries collected yesterday suggestive of ATN  Bladder scan 0    ? Continue aggressive diuresis with lasix infusion  Avoid nephrotoxins, maximize hemodynamics, periodically monitor for urinary retention  2  Stage 3 Chronic Kidney Disease with baseline creatinine around 1 3 mg/dL  3  Probable hypertensive nephrosclerosis   ? Blood pressure is low/normal  Ace/arb not appropriate at this time  4  Acute on Chronic Diastolic Congestive Heart Failure  ? Continue diuresis with lasix infusion  Will defer metolazone again today and attempt to correct hypokalemia more aggressively  5  Hypokalemia  ? Will provide patient with 120 mEq oral potassium today  6  Cirrhosis with ascites  ? Continue diuresis   7  Right pleural effusion  ? Management per pulmonology       ROS  No physical complaints  A complete 10 point review of systems have been performed and are otherwise negative  Historical Information   Past Medical History:   Diagnosis Date    A-fib [facility])     Cardiac disease     Chronic combined systolic (congestive) and diastolic (congestive) heart failure (HCC)     Diabetes mellitus ([facility] [address] )     Dilated cardiomyopathy ([facility] [address] )     History of echocardiogram 11/24/2014    EF 0 30 (30%), Likely mod LV systolic dysfunction  Likely RV dysfunction as well      History of Kemi MPI 02/19/2016    EF 0 43 (43%), no prior MI or ischemia   Hx of echocardiogram 04/28/2017    Normal EF, Normal LV systolic function  Mild concentric LV hypertrophy  Mild mitral and tricuspid regurg   Hx: recurrent pneumonia     Hypertension     Long term (current) use of anticoagulants     Morbid (severe) obesity due to excess calories (HCC)     Neuropathy      Past Surgical History:   Procedure Laterality Date    HERNIA REPAIR      IR THORACENTESIS  8/25/2020    IR THORACENTESIS  2/23/2021    SPLENECTOMY, TOTAL      VASCULAR SURGERY Right     leg     Social History   Social History     Substance and Sexual Activity   Alcohol Use Yes    Alcohol/week: 14 0 standard drinks    Types: 5 Glasses of wine, 5 Cans of beer, 4 Shots of liquor per week    Frequency: 2-3 times a week    Drinks per session: 5 or 6    Binge frequency: Never    Comment: WEEKLY     Social History     Substance and Sexual Activity   Drug Use Never     Social History     Tobacco Use   Smoking Status Current Some Day Smoker    Packs/day: 0 25    Types: Cigarettes   Smokeless Tobacco Never Used   Tobacco Comment    2-3 CIGARETTES DAILY       Family History:   No family history on file      Medications:  Pertinent medications were reviewed  Current Facility-Administered Medications   Medication Dose Route Frequency Provider Last Rate    bisacodyl  10 mg Oral After Jose Ritchie,       carvedilol  6 25 mg Oral BID With Meals [employer], DO      cholecalciferol  2,000 Units Oral Daily [employer], DO      digoxin  250 mcg Oral Daily [employer], DO      docusate sodium  100 mg Oral BID [employer], DO      ferrous sulfate  325 mg Oral Daily With Breakfast Isatu Marquez MD      furosemide  20 mg/hr Intravenous Continuous Camilo Arthur DO 20 mg/hr (03/02/21 6436)    insulin lispro  1-6 Units Subcutaneous HS Rocco Lawrence DO      insulin lispro  2-12 Units Subcutaneous TID AC Elyse Whitt, DO      nicotine  1 patch Transdermal Daily [facility]      oxyCODONE  5 mg Oral Q4H PRN Lucascristina Whitt, DO      pantoprazole  40 mg Oral Early Morning Elyse Whitt, DO      polyvinyl alcohol  2 drop Both Eyes Q3H PRN Elyse Whitt, DO      potassium chloride  20 mEq Oral TID With Meals Beka Jimenez MD      warfarin  5 mg Oral Daily (warfarin) Elyse Whitt, DO           No Known Allergies      Vitals:   BP (!) 104/46   Pulse 63   Temp 98 1 °F (36 7 °C) (Oral)   Resp 17   Ht 5' 11" (1 803 m)   Wt (!) 150 kg (331 lb 2 1 oz)   SpO2 96%   BMI 46 18 kg/m²   Body mass index is 46 18 kg/m²  SpO2: 96 %,   SpO2 Activity: At Rest,   O2 Device: None (Room air)      Intake/Output Summary (Last 24 hours) at 3/2/2021 1349  Last data filed at 3/2/2021 1346  Gross per 24 hour   Intake 927 5 ml   Output 6325 ml   Net -5397 5 ml     Invasive Devices     Peripheral Intravenous Line            Peripheral IV 02/27/21 Distal;Right;Ventral (anterior) Forearm 3 days                Physical Exam  General: conscious, cooperative, in no acute distress  Eyes: conjunctivae pink, anicteric sclerae  ENT: lips and mucous membranes moist  Neck: supple, no JVD, no masses  Chest: diminished breath sounds bilateral, no crackles, ronchus or wheezings  CVS: S1 & S2, normal rate, regular rhythm  Abdomen: soft, non-tender, non-distended, normoactive bowel sounds, obese  Extremities: severe edema of both legs  Skin: no rash  Neuro: awake, alert, oriented      Diagnostic Data:  Lab: I have personally reviewed pertinent lab results  ,   CBC:  Results from last 7 days   Lab Units 03/02/21  0436   WBC Thousand/uL 5 94   HEMOGLOBIN g/dL 9 1*   HEMATOCRIT % 30 4*   PLATELETS Thousands/uL 185      CMP:   Lab Results   Component Value Date    SODIUM 140 03/02/2021    K 3 3 (L) 03/02/2021    CL 99 (L) 03/02/2021    CO2 37 (H) 03/02/2021    BUN 26 (H) 03/02/2021    CREATININE 1 66 (H) 03/02/2021    CALCIUM 9 4 03/02/2021    AST 17 03/02/2021    ALT 19 03/02/2021    ALKPHOS 130 (H) 03/02/2021    EGFR 44 03/02/2021   ,   PT/INR:   Lab Results   Component Value Date    INR 2 36 (H) 03/02/2021   ,   Magnesium: No components found for: MAG,  Phosphorous:   Lab Results   Component Value Date    PHOS 3 8 03/02/2021       Microbiology:  @LABNT,(urinecx:7)@        TRES Curtis    Portions of the record may have been created with voice recognition software  Occasional wrong word or "sound a like" substitutions may have occurred due to the inherent limitations of voice recognition software  Read the chart carefully and recognize, using context, where substitutions have occurred

## 2021-03-02 NOTE — ASSESSMENT & PLAN NOTE
Lab Results   Component Value Date    EGFR 44 03/02/2021    EGFR 46 03/01/2021    EGFR 44 02/28/2021    CREATININE 1 66 (H) 03/02/2021    CREATININE 1 59 (H) 03/01/2021    CREATININE 1 66 (H) 02/28/2021     · Baseline serum creatinine of 1 3-1 6 mg/dl  · Slight increase in the creatinine level while on the IV lasix drip/infusion  · Avoid all nephrotoxic agents  · Serial laboratory testing to monitor the patient's renal function and electrolytes  · Check post-void residuals

## 2021-03-02 NOTE — ASSESSMENT & PLAN NOTE
· Initiated on cholecalciferol 2000 I  U  PO Qdaily  · Recheck a vitamin D 25-OH level in 1-2 months with his PCP    Results for Malik Voss (MRN [id number]) as of 2/24/2021 12:28   Ref   Range 2/23/2021 05:26   Vit D, 25-Hydroxy Latest Ref Range: 30 0 - 100 0 ng/mL 13 1 (L)

## 2021-03-02 NOTE — ASSESSMENT & PLAN NOTE
Wt Readings from Last 3 Encounters:   03/02/21 (!) 150 kg (331 lb 2 1 oz)   01/13/21 (!) 159 kg (351 lb 6 4 oz)   01/12/21 (!) 161 kg (356 lb)     · Troponin levels were within normal limits x 3 sets  · Dry weight around 320 lbs  · Transitioned to lasix gtt @ 20 mg/hr on 02/28/2021, patient lost 11lbs on 3/1, lost 13 lbs today (344 -> 331lbs)  · Metolazone 2 5 mg PO x1 on 2/28  · Continue coreg 6 25 mg PO BID  · Monitor the patient's renal function closely while receiving the lasix gtt treatment  · Daily weights  · Strict intake/output measurements  · PT/OT  · Cardiology and Nephrology recommendation appreciated

## 2021-03-02 NOTE — PROGRESS NOTES
Cardiology Progress Note - [place] Day 57 y o  male MRN: [id number]    Unit/Bed#: 410-01 Encounter: [id number]    Assessment/Plan:  1  Acute on chronic diastolic congestive heart failure              - has had significant urinary output with increase in lasix gtt to 20 mg/hr, metolazone 2 5 x 1 dose given on 2/28   - overall net negative almost 20 L since admission   - weight down another 13 lbs from yesterday, overall down almost 40 lbs since admission   - renal function is stable   - would continue lasix gtt at current dose as long as patient is responding and renal function stable   - continue daily weights, low sodium diet, fluid restriction, strict I/O   - replete K+   - monitor daily BMP     2  Chronic atrial fibrillation              - remains in atrial fibrillation with controlled rates on telemetry              - carvedilol has been held x 2 doses given hypotension - give as BP allows   - continue digoxin              - on anticoagulation with coumadin for goal INR 2-3     3  Hypertension               - with some hypotension this AM   - carvedilol has been held, continue to monitor      4  Obstructive sleep apnea              - reports compliance with CPAP       Subjective:   Patient seen and examined  He offers no acute complaints  Continues to diurese well  Denies chest pain  Review of Systems   Constitution: Negative for chills and fever  HENT: Negative  Cardiovascular: Positive for dyspnea on exertion and leg swelling  Negative for chest pain, near-syncope, orthopnea, palpitations, paroxysmal nocturnal dyspnea and syncope  Respiratory: Negative for cough and shortness of breath  Gastrointestinal: Positive for bloating  Negative for diarrhea, nausea and vomiting  Genitourinary: Negative  Neurological: Negative for dizziness and light-headedness  All other systems reviewed and are negative        Objective:   Vitals: Blood pressure (!) 104/46, pulse 63, temperature 98 1 °F (36 7 °C), temperature source Oral, resp  rate 17, height 5' 11" (1 803 m), weight (!) 150 kg (331 lb 2 1 oz), SpO2 96 %  , Body mass index is 46 18 kg/m² ,   Orthostatic Blood Pressures      Most Recent Value   Blood Pressure  (!) 104/46 filed at 03/02/2021 0826   Patient Position - Orthostatic VS  Sitting filed at 03/02/2021 8087         Systolic (69NRH), OYQ:579 , Min:104 , UIY:664     Diastolic (83TKP), AVQ:52, Min:43, Max:65      Intake/Output Summary (Last 24 hours) at 3/2/2021 1028  Last data filed at 3/2/2021 9329  Gross per 24 hour   Intake 1380 ml   Output 7125 ml   Net -5745 ml     Weight (last 2 days)     Date/Time   Weight    03/02/21 0550   (!) 150 (331 13)    03/01/21 0507   (!) 156 (344 8)    02/28/21 0552   (!) 161 (355 6)    02/28/21 0551   (!) 161 (355 6)    02/28/21 0503   (!) 161 (355 6)                Telemetry Review: atrial fibrillation with controlled rates  EKG personally reviewed by Shara Lino PA-C  Physical Exam  Vitals signs reviewed  Constitutional:       General: He is not in acute distress  Appearance: He is well-developed  He is obese  He is not diaphoretic  HENT:      Head: Normocephalic and atraumatic  Eyes:      Pupils: Pupils are equal, round, and reactive to light  Neck:      Musculoskeletal: Normal range of motion  Vascular: JVD present  No carotid bruit  Cardiovascular:      Rate and Rhythm: Normal rate  Rhythm irregularly irregular  Pulses:           Radial pulses are 2+ on the right side and 2+ on the left side  Heart sounds: S1 normal and S2 normal  No murmur  Pulmonary:      Effort: Pulmonary effort is normal  No respiratory distress  Breath sounds: Decreased breath sounds present  No wheezing or rales  Abdominal:      General: There is no distension  Palpations: Abdomen is soft  Tenderness: There is no abdominal tenderness  Musculoskeletal: Normal range of motion        Right lower leg: Edema (+2 edema noted in bilateral lower extremities) present  Left lower leg: Edema present  Skin:     General: Skin is warm and dry  Findings: No erythema  Neurological:      General: No focal deficit present  Mental Status: He is alert and oriented to person, place, and time     Psychiatric:         Mood and Affect: Mood normal          Behavior: Behavior normal            Laboratory Results:  Results from last 7 days   Lab Units 02/24/21  0637   TROPONIN I ng/mL 0 03       CBC with diff:   Results from last 7 days   Lab Units 03/02/21  0436 03/01/21  0626 02/28/21  0454 02/27/21  4596 02/26/21  0558 02/25/21  0528 02/24/21  0637   WBC Thousand/uL 5 94 5 81 5 75 5 46 5 79 5 97 6 44   HEMOGLOBIN g/dL 9 1* 9 2* 8 4* 9 1* 9 1* 9 3* 9 5*   HEMATOCRIT % 30 4* 32 1* 28 4* 31 0* 31 3* 31 6* 31 9*   MCV fL 91 92 92 91 92 92 91   PLATELETS Thousands/uL 185 187 175 169 173 161 175   MCH pg 27 2 26 2* 27 1 26 8 26 8 27 0 27 1   MCHC g/dL 29 9* 28 7* 29 6* 29 4* 29 1* 29 4* 29 8*   RDW % 16 8* 16 9* 17 2* 17 2* 17 1* 17 1* 17 0*   MPV fL 10 9 11 4 11 1 10 3 11 1 10 4 10 7   NRBC AUTO /100 WBCs 0 0 0  --  0 0 0         CMP:  Results from last 7 days   Lab Units 03/02/21  0436 03/01/21  0626 02/28/21  0454 02/27/21  8670 02/26/21  0558 02/25/21  0528 02/24/21  0637   POTASSIUM mmol/L 3 3* 3 4* 3 4* 3 8 3 5 4 0 3 6   CHLORIDE mmol/L 99* 101 101 101 98* 98* 98*   CO2 mmol/L 37* 34* 31 34* 33* 32 35*   BUN mg/dL 26* 27* 27* 25 24 21 21   CREATININE mg/dL 1 66* 1 59* 1 66* 1 66* 1 62* 1 55* 1 50*   CALCIUM mg/dL 9 4 9 2 8 6 8 5 8 6 8 5 8 6   AST U/L 17 23 21  --  19 20 20   ALT U/L 19 20 19  --  20 17 20   ALK PHOS U/L 130* 126* 120*  --  127* 126* 121*   EGFR ml/min/1 73sq m 44 46 44 44 45 47 49         BMP:  Results from last 7 days   Lab Units 03/02/21  0436 03/01/21  0626 02/28/21  0454 02/27/21  1480 02/26/21  0558 02/25/21  0528 02/24/21  0637   POTASSIUM mmol/L 3 3* 3 4* 3 4* 3 8 3 5 4 0 3 6   CHLORIDE mmol/L 99* 101 101 101 98* 98* 98*   CO2 mmol/L 37* 34* 31 34* 33* 32 35*   BUN mg/dL 26* 27* 27*    CREATININE mg/dL 1 66* 1 59* 1 66* 1 66* 1 62* 1 55* 1 50*   CALCIUM mg/dL 9 4 9 2 8 6 8 5 8 6 8 5 8 6       BNP: No results for input(s): BNP in the last 72 hours  Magnesium:   Results from last 7 days   Lab Units 21  0436 21  0626 21  0454 21  0558 21  0528 21  0637   MAGNESIUM mg/dL 2 0 2 3 2 1 2 3 2 1 2 1       Coags:   Results from last 7 days   Lab Units 21  0436 21  0626 21  0454 21  4687 21  0558 21  0528 21  0637   INR  2 36* 2 16* 2 53* 2 48* 2 32* 2 39* 2 40*       TSH:        Hemoglobin A1C       Lipid Profile:       Cardiac testing:   Results for orders placed during the hospital encounter of 21   Echo complete with contrast if indicated    Narrative [address] [facility] [address] [facility] [address], [facility] [address]  [phone]    Transthoracic Echocardiogram  2D, M-mode, Doppler, and Color Doppler    Study date:  2021    Patient: Yuni Motley  MR number: [id number]  Account number: [de-identified]  : 1960  Age: 57 years  Gender: Male  Status: Inpatient  Location: Bedside  Height: 71 in  Weight: 350 lb  BP: 130/ 80 mmHg    Indications: shortness of breath    Diagnoses: 428 0 - CONGESTIVE HEART FAILURE    Sonographer:  Skyuni 61, CCT  Primary Physician:  Carloz Couch DO  Referring Physician:  Cyn Cooley DO  Group:  [facility]  Interpreting Physician:  Bell Chopra DO    SUMMARY    LEFT VENTRICLE:  Systolic function was normal  Ejection fraction was estimated to be 60 %  There were no regional wall motion abnormalities  Wall thickness was mildly increased  RIGHT VENTRICLE:  The ventricle was moderately dilated  Systolic function was mildly reduced  LEFT ATRIUM:  The atrium was mildly dilated  RIGHT ATRIUM:  The atrium was moderately dilated      MITRAL VALVE:  There was mild regurgitation  TRICUSPID VALVE:  There was moderate regurgitation  Estimated peak PA pressure was 52 mmHg  HISTORY: PRIOR HISTORY: CHF, morbid obesity    PROCEDURE: The procedure was performed at the bedside  This was a routine study  The transthoracic approach was used  The study included complete 2D imaging, M-mode, complete spectral Doppler, and color Doppler  The heart rate was 80 bpm,  at the start of the study  Echocardiographic views were limited due to poor patient compliance and poor acoustic window availability  This was a technically difficult study  LEFT VENTRICLE: Size was normal  Systolic function was normal  Ejection fraction was estimated to be 60 %  There were no regional wall motion abnormalities  Wall thickness was mildly increased  DOPPLER: The study was not technically  sufficient to allow evaluation of LV diastolic function  RIGHT VENTRICLE: The ventricle was moderately dilated  Systolic function was mildly reduced  LEFT ATRIUM: The atrium was mildly dilated  RIGHT ATRIUM: The atrium was moderately dilated  MITRAL VALVE: Valve structure was normal  There was normal leaflet separation  DOPPLER: The transmitral velocity was within the normal range  There was no evidence for stenosis  There was mild regurgitation  AORTIC VALVE: The valve was trileaflet  Leaflets exhibited normal thickness and normal cuspal separation  DOPPLER: Transaortic velocity was within the normal range  There was no evidence for stenosis  There was no regurgitation  TRICUSPID VALVE: The valve structure was normal  There was normal leaflet separation  DOPPLER: The transtricuspid velocity was within the normal range  There was no evidence for stenosis  There was moderate regurgitation  Estimated peak PA  pressure was 52 mmHg  PULMONIC VALVE: Leaflets exhibited normal thickness, no calcification, and normal cuspal separation   DOPPLER: The transpulmonic velocity was within the normal range  There was no regurgitation  PERICARDIUM: There was no pericardial effusion  The pericardium was normal in appearance  AORTA: The root exhibited normal size  SYSTEMIC VEINS: IVC: The inferior vena cava was not well visualized  SYSTEM MEASUREMENT TABLES    2D  %FS: 31 45 %  Ao Diam: 3 22 cm  EDV(Teich): 148 86 ml  EF(Teich): 58 74 %  ESV(Teich): 61 42 ml  IVSd: 1 51 cm  LA Area: 20 63 cm2  LA Diam: 5 65 cm  LVIDd: 5 52 cm  LVIDs: 3 79 cm  LVPWd: 1 08 cm  RA Area: 33 86 cm2  RWT: 0 39  SV(Teich): 87 44 ml    CW  RAP: 0 mmHg  TR Vmax: 3 47 m/s  TR maxP 33 mmHg    PW  E' Sept: 0 09 m/s  E/E' Sept: 13 35  MV A Ti: 0 48 m/s  MV Dec [place]: 4 5 m/s2  MV DecT: 253 15 ms  MV E Ti: 1 14 m/s  MV E/A Ratio: 2 36  RVSP: 48 33 mmHg    [facility]    Prepared and electronically signed by    Roxanna Monte DO  Signed 2021 08:06:00       No results found for this or any previous visit  No results found for this or any previous visit  No results found for this or any previous visit      Meds/Allergies   all current active meds have been reviewed  Medications Prior to Admission   Medication    carvedilol (COREG) 6 25 mg tablet    digoxin (LANOXIN) 0 25 mg tablet    furosemide (LASIX) 40 mg tablet    metFORMIN (GLUCOPHAGE) 1000 MG tablet    metolazone (ZAROXOLYN) 5 mg tablet    omeprazole (PriLOSEC) 40 MG capsule    warfarin (COUMADIN) 5 mg tablet    zolpidem (AMBIEN) 10 mg tablet       furosemide, 20 mg/hr, Last Rate: 20 mg/hr (21 4514)      Assessment:  Principal Problem:    Acute on chronic diastolic CHF (congestive heart failure) (HCC)  Active Problems:    STEFF (obstructive sleep apnea)    Pleural effusion on right    Cirrhosis of liver with ascites (HCC)    CKD (chronic kidney disease) stage 3, GFR 30-59 ml/min    Hypokalemia    Lactic acidosis    Permanent atrial fibrillation (HCC)    Diverticulosis of colon    Lesion of spleen    Type 2 diabetes mellitus with hyperglycemia, without long-term current use of insulin (HCC)    Hypoxia    Tobacco use    Hyponatremia    Constipation    Pulmonary hypertension (HCC)    Vitamin D deficiency    Atelectasis    Anemia

## 2021-03-02 NOTE — PROGRESS NOTES
Progress Note - Tawnya Has 1960, 57 y o  male MRN: [id number]    Unit/Bed#: 410-01 Encounter: [id number]    Primary Care Provider: Orlin Hoyt DO   Date and time admitted to hospital: 2/22/2021  9:27 AM        * Acute on chronic diastolic CHF (congestive heart failure) ([address] )  Assessment & Plan  Wt Readings from Last 3 Encounters:   03/02/21 (!) 150 kg (331 lb 2 1 oz)   01/13/21 (!) 159 kg (351 lb 6 4 oz)   01/12/21 (!) 161 kg (356 lb)     · Troponin levels were within normal limits x 3 sets  · Dry weight around 320 lbs  · Transitioned to lasix gtt @ 20 mg/hr on 02/28/2021, patient lost 11lbs on 3/1, lost 13 lbs today (344 -> 331lbs)  · Metolazone 2 5 mg PO x1 on 2/28  · Continue coreg 6 25 mg PO BID  · Monitor the patient's renal function closely while receiving the lasix gtt treatment  · Daily weights  · Strict intake/output measurements  · PT/OT  · Cardiology and Nephrology recommendation appreciated        Anemia  Assessment & Plan  · Vitamin B12 level, and folate level WNL  · Iron panel: decreased iron and low normal ferritin, initiated ferrous sulfate PO daily  · Check the patient's stool for occult blood x 3 specimens: negative x 1  · Follow the CBC  · Transfuse for a hemoglobin less than 7 g/dl     Results from last 7 days   Lab Units 03/02/21  0436 03/01/21  0626 02/28/21  0454   WBC Thousand/uL 5 94 5 81 5 75   HEMOGLOBIN g/dL 9 1* 9 2* 8 4*   HEMATOCRIT % 30 4* 32 1* 28 4*   PLATELETS Thousands/uL 185 187 175         Atelectasis  Assessment & Plan  · Incentive spirometry 10 times per hour while awake    Vitamin D deficiency  Assessment & Plan  · Initiated on cholecalciferol 2000 I  U  PO Qdaily  · Recheck a vitamin D 25-OH level in 1-2 months with his PCP    Results for Ezequiel Perez (MRN [id number]) as of 2/24/2021 12:28   Ref   Range 2/23/2021 05:26   Vit D, 25-Hydroxy Latest Ref Range: 30 0 - 100 0 ng/mL 13 1 (L)           Pulmonary hypertension (HCC)  Assessment & Plan  · Outpatient follow-up with Pulmonology    Constipation  Assessment & Plan  · Received miralax 17 grams PO x 1 dose on 02/24/2021  · The constipation has improved  · Continue colace 100 mg PO BID and dulcolax 10 mg PO Qdaily after dinner    Hyponatremia  Assessment & Plan  · Likely hypervolemic hyponatremia  · Resolved with IV lasix treatment  · Increased lasix gtt @ 20 mg/hr on 02/28/2021  · Follow the sodium level    Results from last 7 days   Lab Units 03/02/21  0436 03/01/21  0626 02/28/21  0454   SODIUM mmol/L 140 139 139   POTASSIUM mmol/L 3 3* 3 4* 3 4*   CHLORIDE mmol/L 99* 101 101   CO2 mmol/L 37* 34* 31   BUN mg/dL 26* 27* 27*   CREATININE mg/dL 1 66* 1 59* 1 66*   CALCIUM mg/dL 9 4 9 2 8 6         Tobacco use  Assessment & Plan  · Nicotine patch  · Smoking cessation counseling    Hypoxia  Assessment & Plan  · Initially required 4 lpm of continuous supplemental oxygen via the nasal cannula to maintain oxygen saturations at 92% and above  · Successfully weaned off supplemental oxygen at this time  · Secondary to right-sided pleural effusion  · On the day of discharge, we will need to check an ambulatory pulse oximetry test to see if the patient qualifies for home supplemental oxygen    · Respiratory protocol  · Incentive spirometry 10 times per hour while awake    Type 2 diabetes mellitus with hyperglycemia, without long-term current use of insulin [facility])  Assessment & Plan  Lab Results   Component Value Date    HGBA1C 6 1 (H) 01/05/2021       Recent Labs     03/01/21  1108 03/01/21  1543 03/01/21  2043 03/02/21  0713   POCGLU 130 119 139 125       Blood Sugar Average: Last 72 hrs:  (P) 283 [id number]     · Recently take off metformin as an outpatient secondary to his liver disease  · Not currently on any diabetic medications as an outpatient  · Consider the initiation of an ACE-inhibitor for renal protection in the setting of type 2 diabetes mellitus if his renal function remains stable  · Insulin sliding scale with blood glucose monitoring ACHS    Lesion of spleen  Assessment & Plan  · CT scan of the abdomen/pelvis (02/22/2021): Subcapsular cystic lesion in the spleen (probably related to old trauma)   Outpatient follow-up with PCP in regards to this matter    Diverticulosis of colon  Assessment & Plan  · Outpatient follow-up with Gastroenterology    Permanent atrial fibrillation ([place] [address] )  Assessment & Plan  · Continue PO coreg and PO digoxin  · Continue anticoagulation with PO coumadin for a goal INR of 2-3  · Outpatient follow-up with Cardiology    Results for Malik Voss (MRN [id number]) as of 2/24/2021 12:39   Ref  Range 2/23/2021 05:27   DIGOXIN LEVEL Latest Ref Range: 0 8 - 2 0 ng/mL 1 7       Lactic acidosis  Assessment & Plan  · Resolved with thiamine 200 mg IV x 1 dose on 02/22/2021    Hypokalemia  Assessment & Plan  K+ of 3 3, repletion with potassium chloride   Continue to monitor BMP and Mg    CKD (chronic kidney disease) stage 3, GFR 30-59 ml/min  Assessment & Plan  Lab Results   Component Value Date    EGFR 44 03/02/2021    EGFR 46 03/01/2021    EGFR 44 02/28/2021    CREATININE 1 66 (H) 03/02/2021    CREATININE 1 59 (H) 03/01/2021    CREATININE 1 66 (H) 02/28/2021     · Baseline serum creatinine of 1 3-1 6 mg/dl  · Slight increase in the creatinine level while on the IV lasix drip/infusion  · Avoid all nephrotoxic agents  · Serial laboratory testing to monitor the patient's renal function and electrolytes  · Check post-void residuals    Cirrhosis of liver with ascites ([facility] )  Assessment & Plan  · The patient was seen in consultation by Gastroenterology  · Avoid all hepatotoxic agents  · Outpatient hepatocellular carcinoma surveillance and follow-up with Gastroenterology    US liver doppler only  Status: Final result   PACS Images     Show images for US liver doppler only   Study Result    ABDOMEN ULTRASOUND, COMPLETE WITH DOPPLER     INDICATION:    Cirrhosis with ascites         COMPARISON:  Compared with 10/2/2020     TECHNIQUE:   Real-time ultrasound of the abdomen was performed with a curvilinear transducer with Doppler evaluation of liver      FINDINGS:     The main portal vein and primary branch segments are patent and hepatopetal with normal spectral waveform  Peak systolic velocity at 32 cm/s  The right at 42 cm/s and the left at 59 cm/s      Hepatic veins are patent  Spectral waveforms within normal limits  Right hepatic vein at 57 cm/s, mid hepatic vein at 56 cm/s and left hepatic vein at 77 cm/s        Main hepatic artery appears normal size, patent with normal spectral waveform      Splenic vein is patent  56 cm/s      IMPRESSION:     Patent portal vein and hepatic veins  Pleural effusion on right  Assessment & Plan  · Recurrent right-sided pleural effusion  · Successful right-sided thoracentesis by Intervention Radiology on 02/23/2021, which yielded 1600 ml of pleural fluid  · Increased to lasix gtt @ 20 mg/hr on 02/28/2021  · Appears to be a transudative pleural effusion by fluid analysis  · The patient was seen in consultation by Pulmonology  STEFF (obstructive sleep apnea)  Assessment & Plan  · Continue BIPAP at 17/11 QHS and anytime while sleeping      Subjective:   Patient seen and examined at bedside  OOB in chair  Patient reports feeling better  Urine output 7 9L over past 24 hours  He lost 13 lbs since yesterday  BP lower range this morning at 100s/40s mmHg  Patient denied any dizziness  Objective:     Vitals: Blood pressure (!) 104/46, pulse 63, temperature 98 1 °F (36 7 °C), temperature source Oral, resp  rate 17, height 5' 11" (1 803 m), weight (!) 150 kg (331 lb 2 1 oz), SpO2 96 %  ,Body mass index is 46 18 kg/m²    Wt Readings from Last 3 Encounters:   03/02/21 (!) 150 kg (331 lb 2 1 oz)   01/13/21 (!) 159 kg (351 lb 6 4 oz)   01/12/21 (!) 161 kg (356 lb)       Intake/Output Summary (Last 24 hours) at 3/2/2021 0949  Last data filed at 3/2/2021 0832  Gross per 24 hour   Intake 1380 ml   Output 7125 ml   Net -5745 ml       Physical Exam:     Physical Exam  Vitals signs reviewed  Constitutional:       General: He is not in acute distress  Appearance: He is not ill-appearing  HENT:      Head: Normocephalic  Nose: No congestion or rhinorrhea  Eyes:      General: No scleral icterus  Right eye: No discharge  Left eye: No discharge  Cardiovascular:      Rate and Rhythm: Normal rate  Heart sounds: No murmur  Pulmonary:      Effort: Pulmonary effort is normal  No respiratory distress  Comments: Mildly decreased breath sound in RLL  No crackles appreciated  Abdominal:      General: Bowel sounds are normal       Tenderness: There is no abdominal tenderness  Comments: Obese abdomen   Musculoskeletal:         General: No tenderness  Right lower leg: Edema present  Left lower leg: Edema present  Comments: Improving lower extremities pitting edema  Chronic venous stasis changes in b/l LE  Skin:     General: Skin is warm  Neurological:      Mental Status: He is alert and oriented to person, place, and time     Psychiatric:         Mood and Affect: Mood normal          Behavior: Behavior normal          Recent Results (from the past 24 hour(s))   Fingerstick Glucose (POCT)    Collection Time: 03/01/21 11:08 AM   Result Value Ref Range    POC Glucose 130 65 - 140 mg/dl   Creatinine, urine, random    Collection Time: 03/01/21  2:34 PM   Result Value Ref Range    Creatinine, Ur 18 0 mg/dL   Urea nitrogen, urine    Collection Time: 03/01/21  2:34 PM   Result Value Ref Range    Urea Nitrogen, Ur 134 mg/dL   Fingerstick Glucose (POCT)    Collection Time: 03/01/21  3:43 PM   Result Value Ref Range    POC Glucose 119 65 - 140 mg/dl   Fingerstick Glucose (POCT)    Collection Time: 03/01/21  8:43 PM   Result Value Ref Range    POC Glucose 139 65 - 140 mg/dl   Protime-INR    Collection Time: 03/02/21  4:36 AM   Result Value Ref Range    Protime 25 3 (H) 11 6 - 14 5 seconds    INR 2 36 (H) 0 84 - 1 19   Comprehensive metabolic panel    Collection Time: 03/02/21  4:36 AM   Result Value Ref Range    Sodium 140 136 - 145 mmol/L    Potassium 3 3 (L) 3 5 - 5 3 mmol/L    Chloride 99 (L) 100 - 108 mmol/L    CO2 37 (H) 21 - 32 mmol/L    ANION GAP 4 4 - 13 mmol/L    BUN 26 (H) 5 - 25 mg/dL    Creatinine 1 66 (H) 0 60 - 1 30 mg/dL    Glucose 128 65 - 140 mg/dL    Calcium 9 4 8 3 - 10 1 mg/dL    Corrected Calcium 10 4 (H) 8 3 - 10 1 mg/dL    AST 17 5 - 45 U/L    ALT 19 12 - 78 U/L    Alkaline Phosphatase 130 (H) 46 - 116 U/L    Total Protein 7 7 6 4 - 8 2 g/dL    Albumin 2 7 (L) 3 5 - 5 0 g/dL    Total Bilirubin 0 80 0 20 - 1 00 mg/dL    eGFR 44 ml/min/1 73sq m   Magnesium    Collection Time: 03/02/21  4:36 AM   Result Value Ref Range    Magnesium 2 0 1 6 - 2 6 mg/dL   Phosphorus    Collection Time: 03/02/21  4:36 AM   Result Value Ref Range    Phosphorus 3 8 2 3 - 4 1 mg/dL   CBC and differential    Collection Time: 03/02/21  4:36 AM   Result Value Ref Range    WBC 5 94 4 31 - 10 16 Thousand/uL    RBC 3 35 (L) 3 88 - 5 62 Million/uL    Hemoglobin 9 1 (L) 12 0 - 17 0 g/dL    Hematocrit 30 4 (L) 36 5 - 49 3 %    MCV 91 82 - 98 fL    MCH 27 2 26 8 - 34 3 pg    MCHC 29 9 (L) 31 4 - 37 4 g/dL    RDW 16 8 (H) 11 6 - 15 1 %    MPV 10 9 8 9 - 12 7 fL    Platelets 994 312 - 784 Thousands/uL    nRBC 0 /100 WBCs    Neutrophils Relative 76 (H) 43 - 75 %    Immat GRANS % 0 0 - 2 %    Lymphocytes Relative 12 (L) 14 - 44 %    Monocytes Relative 7 4 - 12 %    Eosinophils Relative 4 0 - 6 %    Basophils Relative 1 0 - 1 %    Neutrophils Absolute 4 51 1 85 - 7 62 Thousands/µL    Immature Grans Absolute 0 02 0 00 - 0 20 Thousand/uL    Lymphocytes Absolute 0 70 0 60 - 4 47 Thousands/µL    Monocytes Absolute 0 41 0 17 - 1 22 Thousand/µL    Eosinophils Absolute 0 26 0 00 - 0 61 Thousand/µL    Basophils Absolute 0 04 0 00 - 0 10 Thousands/µL   Fingerstick Glucose (POCT)    Collection Time: 03/02/21  7:13 AM   Result Value Ref Range    POC Glucose 125 65 - 140 mg/dl       Current Facility-Administered Medications   Medication Dose Route Frequency Provider Last Rate Last Admin    bisacodyl (DULCOLAX) EC tablet 10 mg  10 mg Oral After Jose Ritchie, DO   10 mg at 03/01/21 1823    carvedilol (COREG) tablet 6 25 mg  6 25 mg Oral BID With Meals [facility], DO   6 25 mg at 03/01/21 4598    cholecalciferol (VITAMIN D3) tablet 2,000 Units  2,000 Units Oral Daily [facility], DO   2,000 Units at 03/02/21 0825    digoxin (LANOXIN) tablet 250 mcg  250 mcg Oral Daily [facility], DO   250 mcg at 03/02/21 0827    docusate sodium (COLACE) capsule 100 mg  100 mg Oral BID [facility], DO   100 mg at 03/02/21 0825    ferrous sulfate tablet 325 mg  325 mg Oral Daily With Breakfast Isatu Lopes MD   325 mg at 03/02/21 0825    furosemide (LASIX) 500 mg infusion 50 mL  20 mg/hr Intravenous Continuous Rejeana Bath, DO 2 mL/hr at 03/02/21 0239 20 mg/hr at 03/02/21 0239    insulin lispro (HumaLOG) 100 units/mL subcutaneous injection 1-6 Units  1-6 Units Subcutaneous HS [facility], DO   1 Units at 02/23/21 2152    insulin lispro (HumaLOG) 100 units/mL subcutaneous injection 2-12 Units  2-12 Units Subcutaneous TID [facility], DO   2 Units at 02/28/21 1158    nicotine (NICODERM CQ) 7 mg/24hr TD 24 hr patch 1 patch  1 patch Transdermal Daily [facility], DO        oxyCODONE (ROXICODONE) IR tablet 5 mg  5 mg Oral Q4H PRN [facility], DO   5 mg at 03/01/21 1828    pantoprazole (PROTONIX) EC tablet 40 mg  40 mg Oral Early Morning [facility], DO   40 mg at 03/02/21 9653    polyvinyl alcohol (LIQUIFILM TEARS) 1 4 % ophthalmic solution 2 drop  2 drop Both Eyes Q3H PRN [facility], DO   2 drop at 02/26/21 1707    potassium chloride (K-DUR,KLOR-CON) CR tablet 20 mEq  20 mEq Oral TID With Meals Joan Castro MD        warfarin (COUMADIN) tablet 5 mg  5 mg Oral Daily (warfarin) Lucile Goltz, DO   5 mg at 03/01/21 1824       Invasive Devices     Peripheral Intravenous Line            Peripheral IV 02/27/21 Distal;Right;Ventral (anterior) Forearm 3 days                Lab, Imaging and other studies: I have personally reviewed pertinent reports      VTE Pharmacologic Prophylaxis: Warfarin (Coumadin)  VTE Mechanical Prophylaxis: sequential compression device    Isatu Howe MD

## 2021-03-02 NOTE — ASSESSMENT & PLAN NOTE
· Likely hypervolemic hyponatremia  · Resolved with IV lasix treatment  · Increased lasix gtt @ 20 mg/hr on 02/28/2021  · Follow the sodium level    Results from last 7 days   Lab Units 03/02/21  0436 03/01/21  0626 02/28/21  0454   SODIUM mmol/L 140 139 139   POTASSIUM mmol/L 3 3* 3 4* 3 4*   CHLORIDE mmol/L 99* 101 101   CO2 mmol/L 37* 34* 31   BUN mg/dL 26* 27* 27*   CREATININE mg/dL 1 66* 1 59* 1 66*   CALCIUM mg/dL 9 4 9 2 8 6

## 2021-03-02 NOTE — ASSESSMENT & PLAN NOTE
· Vitamin B12 level, and folate level WNL  · Iron panel: decreased iron and low normal ferritin, initiated ferrous sulfate PO daily  · Check the patient's stool for occult blood x 3 specimens: negative x 1  · Follow the CBC  · Transfuse for a hemoglobin less than 7 g/dl     Results from last 7 days   Lab Units 03/02/21  0436 03/01/21  0626 02/28/21  0454   WBC Thousand/uL 5 94 5 81 5 75   HEMOGLOBIN g/dL 9 1* 9 2* 8 4*   HEMATOCRIT % 30 4* 32 1* 28 4*   PLATELETS Thousands/uL 185 187 175

## 2021-03-02 NOTE — PHYSICAL THERAPY NOTE
PHYSICAL THERAPY NOTE          Patient Name: José Miguel Bowers  GGBVZ'R Date: 3/2/2021      03/02/21 0700   Note Type   Note Type Treatment   Cognition   Overall Cognitive Status [facility]   Arousal/Participation Alert; Cooperative   Following Commands Follows all commands and directions without difficulty   Subjective   Subjective c/o fatigue after ambulation  Transfers   Sit to Stand 5  Supervision   Additional items Armrests   Stand to Sit 5  Supervision   Additional items Armrests   Stand pivot 5  Supervision   Ambulation/Elevation   Gait pattern Excessively slow; Short stride; Foward flexed;Decreased foot clearance   Gait Assistance 5  Supervision   Distance 200'   Balance   Ambulatory Fair   Endurance Deficit   Endurance Deficit Yes   Activity Tolerance   Activity Tolerance Patient limited by fatigue   Assessment   Prognosis Good   Problem List Decreased strength;Decreased endurance; Impaired balance;Decreased mobility;Pain;Obesity   Assessment Pt  seen for PT treatment session this date with interventions consisting of  transfers and  gait training w/ emphasis on improving pt's ability to ambulate  Pt  Currently performing  tx and ambulation at (SUP) x 1 level of function   Utilizing IV pole  with fair balance and stability  The patient's AM-PAC Basic Mobility Inpatient Short Form Raw Score is 23, Standardized Score is 50  88  A standardized score greater than 42 9 suggests the patient may benefit from discharge to home  Please also refer to physical therapy recommendation for safe DC planning   In comparison to previous session, ambulation limited by fatigue  Pt is in need of continued activity in PT to improve strength balance endurance mobility transfers and ambulation with return to maximize LOF  From PT/mobility standpoint, recommendation at time of d/c would be home PT in order to promote return to PLOF and independence      Goals LTG Expiration Date 03/09/21   PT Treatment Day 5   Plan   Treatment/Interventions Functional transfer training;LE strengthening/ROM; Therapeutic exercise; Endurance training;Bed mobility;Gait training   Progress Progressing toward goals   Recommendation   PT Discharge Recommendation Home with skilled therapy   AM-PAC Basic Mobility Inpatient   Turning in Bed Without Bedrails 4   Lying on Back to Sitting on Edge of Flat Bed 4   Moving Bed to Chair 4   Standing Up From Chair 4   Walk in Room 4   Climb 3-5 Stairs 3   Basic Mobility Inpatient Raw Score 23   Basic Mobility Standardized Score 50 88   Pt  OOB in chair  with call bell within reach  at end of PT session  Discussed with  PT today's treatment and patient's current level of function for care coordination

## 2021-03-02 NOTE — ASSESSMENT & PLAN NOTE
Lab Results   Component Value Date    HGBA1C 6 1 (H) 01/05/2021       Recent Labs     03/01/21  1108 03/01/21  1543 03/01/21 2043 03/02/21  0713   POCGLU 130 119 139 125       Blood Sugar Average: Last 72 hrs:  (P) 278 [id number]     · Recently take off metformin as an outpatient secondary to his liver disease  · Not currently on any diabetic medications as an outpatient  · Consider the initiation of an ACE-inhibitor for renal protection in the setting of type 2 diabetes mellitus if his renal function remains stable  · Insulin sliding scale with blood glucose monitoring ACHS

## 2021-03-02 NOTE — PLAN OF CARE
Problem: PHYSICAL THERAPY ADULT  Goal: Performs mobility at highest level of function for planned discharge setting  See evaluation for individualized goals  Description: Treatment/Interventions: LE strengthening/ROM, Functional transfer training, Elevations, Therapeutic exercise, Endurance training, Gait training, Bed mobility          See flowsheet documentation for full assessment, interventions and recommendations  Outcome: Progressing  Note: Prognosis: Good  Problem List: Decreased strength, Decreased endurance, Impaired balance, Decreased mobility, Pain, Obesity  Assessment: Pt  seen for PT treatment session this date with interventions consisting of  transfers and  gait training w/ emphasis on improving pt's ability to ambulate  Pt  Currently performing  tx and ambulation at (SUP) x 1 level of function  Utilizing IV pole  with fair balance and stability  The patient's AM-PAC Basic Mobility Inpatient Short Form Raw Score is 23, Standardized Score is 50  88  A standardized score greater than 42 9 suggests the patient may benefit from discharge to home  Please also refer to physical therapy recommendation for safe DC planning  In comparison to previous session, ambulation limited by fatigue  Pt is in need of continued activity in PT to improve strength balance endurance mobility transfers and ambulation with return to maximize LOF  From PT/mobility standpoint, recommendation at time of d/c would be home PT in order to promote return to PLOF and independence  PT Discharge Recommendation: Home with skilled therapy          See flowsheet documentation for full assessment

## 2021-03-03 NOTE — ASSESSMENT & PLAN NOTE
Lab Results   Component Value Date    HGBA1C 6 1 (H) 01/05/2021       Recent Labs     03/01/21  2043 03/02/21  0713 03/02/21  1045 03/02/21  1601   POCGLU 139 125 113 121       Blood Sugar Average: Last 72 hrs:  (P) 132 [id number]     · Recently take off metformin as an outpatient secondary to his liver disease  · Not currently on any diabetic medications as an outpatient  · Consider the initiation of an ACE-inhibitor for renal protection in the setting of type 2 diabetes mellitus if his renal function remains stable  · Insulin sliding scale with blood glucose monitoring ACHS

## 2021-03-03 NOTE — NURSING NOTE
Spoke with patient's wife with his permission  Updated her on weight loss, diuresis and care that is taken here, ie, CCD diet, diuresis and fluid limit    WIfe voiced understanding and thankfulness for update

## 2021-03-03 NOTE — PHYSICAL THERAPY NOTE
PHYSICAL THERAPY NOTE          Patient Name: Morena Henning  AYOGL'U Date: 3/3/2021      03/03/21 1049   Note Type   Note Type Treatment   Cognition   Overall Cognitive Status [facility]   Arousal/Participation Alert; Cooperative   Subjective   Subjective States he has been doing his exercises 2x/day in his room  Would like to ambulate  Transfers   Sit to Stand 5  Supervision   Additional items Armrests   Stand to Sit 5  Supervision   Additional items Armrests   Stand pivot 5  Supervision   Ambulation/Elevation   Gait pattern Short stride; Foward flexed;Decreased foot clearance   Gait Assistance 5  Supervision   Distance 200'   Balance   Ambulatory Fair   Endurance Deficit   Endurance Deficit Yes   Activity Tolerance   Activity Tolerance Patient limited by fatigue   Assessment   Prognosis Good   Problem List Decreased strength;Decreased endurance; Impaired balance;Decreased mobility;Obesity   Assessment Pt  seen for PT treatment session this date with interventions consisting of transfers and  gait training w/ emphasis on improving pt's ability to ambulate  Pt  Currently performing  tx and ambulation at (SUP ) x 1 level of function  Utilizing RW with fair balance and stability  The patient's AM-PAC Basic Mobility Inpatient Short Form Raw Score is 23, Standardized Score is 50  88  A standardized score greater than 42 9 suggests the patient may benefit from discharge to home  Please also refer to physical therapy recommendation for safe DC planning  In comparison to previous session, Pt  With improvements in activity tolerance  Pt is in need of continued activity in PT to improve strength balance endurance mobility transfers and ambulation with return to maximize LOF  From PT/mobility standpoint, recommendation at time of d/c would be home PT  in order to promote return to PLOF and independence      Goals   LTG Expiration Date 03/09/21   PT Treatment Day 6   Plan   Treatment/Interventions Functional transfer training;LE strengthening/ROM; Elevations; Therapeutic exercise; Endurance training;Bed mobility;Gait training   Progress Progressing toward goals   Recommendation   PT Discharge Recommendation Home with skilled therapy   AM-PAC Basic Mobility Inpatient   Turning in Bed Without Bedrails 4   Lying on Back to Sitting on Edge of Flat Bed 4   Moving Bed to Chair 4   Standing Up From Chair 4   Walk in Room 4   Climb 3-5 Stairs 3   Basic Mobility Inpatient Raw Score 23   Basic Mobility Standardized Score 50 88     Pt  OOB in chair  with call bell within reach at end of PT session  Discussed with  PT today's treatment and patient's current level of function for care coordination

## 2021-03-03 NOTE — ASSESSMENT & PLAN NOTE
Lab Results   Component Value Date    EGFR 40 03/03/2021    EGFR 44 03/02/2021    EGFR 46 03/01/2021    CREATININE 1 79 (H) 03/03/2021    CREATININE 1 66 (H) 03/02/2021    CREATININE 1 59 (H) 03/01/2021     · Baseline serum creatinine of 1 3-1 6 mg/dl  · Slight increase in the creatinine level while on the IV lasix drip/infusion  · Avoid all nephrotoxic agents  · Serial laboratory testing to monitor the patient's renal function and electrolytes  · Check post-void residuals

## 2021-03-03 NOTE — ASSESSMENT & PLAN NOTE
· Likely hypervolemic hyponatremia  · Resolved with IV lasix treatment  · Increased lasix gtt @ 20 mg/hr on 02/28/2021, decreased lasix gtt to 10 mg/hr today 3/3/21 by cardiology  · Follow the sodium level    Results from last 7 days   Lab Units 03/03/21  0512 03/02/21  0436 03/01/21  0626   SODIUM mmol/L 139 140 139   POTASSIUM mmol/L 3 7 3 3* 3 4*   CHLORIDE mmol/L 97* 99* 101   CO2 mmol/L 35* 37* 34*   BUN mg/dL 27* 26* 27*   CREATININE mg/dL 1 79* 1 66* 1 59*   CALCIUM mg/dL 9 2 9 4 9 2

## 2021-03-03 NOTE — PROGRESS NOTES
Progress Note - Sydney Pineda 1960, 57 y o  male MRN: [id number]    Unit/Bed#: 410-01 Encounter: [id number]    Primary Care Provider: Joi Pride DO   Date and time admitted to hospital: 2/22/2021  9:27 AM        * Acute on chronic diastolic CHF (congestive heart failure) [facility])  Assessment & Plan  Wt Readings from Last 3 Encounters:   03/03/21 (!) 146 kg (320 lb 15 8 oz)   01/13/21 (!) 159 kg (351 lb 6 4 oz)   01/12/21 (!) 161 kg (356 lb)     · Troponin levels were within normal limits x 3 sets  · Dry weight around 320 lbs  · Transitioned to lasix gtt @ 20 mg/hr on 02/28/2021, this was decreased to 10 mg/hr today 3/3/21 by cardiology given creatinine elevation  Patient's weight continues to trend down  331 lbs --> 320 lbs today  · Metolazone 2 5 mg PO x1 on 2/28  · Continue coreg 6 25 mg PO BID  · Monitor the patient's renal function closely while receiving the lasix gtt treatment  · Daily weights  · Strict intake/output measurements  · PT/OT  · Cardiology and Nephrology recommendation appreciated        Anemia  Assessment & Plan  · Vitamin B12 level, and folate level WNL  · Iron panel: decreased iron and low normal ferritin, initiated ferrous sulfate PO daily  · Check the patient's stool for occult blood x 3 specimens: negative x 1  · Follow the CBC  · Transfuse for a hemoglobin less than 7 g/dl     Results from last 7 days   Lab Units 03/03/21  0512 03/02/21  0436 03/01/21  0626   WBC Thousand/uL 5 43 5 94 5 81   HEMOGLOBIN g/dL 9 6* 9 1* 9 2*   HEMATOCRIT % 32 0* 30 4* 32 1*   PLATELETS Thousands/uL 188 185 187         Atelectasis  Assessment & Plan  · Incentive spirometry 10 times per hour while awake    Vitamin D deficiency  Assessment & Plan  · Initiated on cholecalciferol 2000 I  U  PO Qdaily  · Recheck a vitamin D 25-OH level in 1-2 months with his PCP    Results for Mikey Reynolds (MRN [id number]) as of 2/24/2021 12:28   Ref   Range 2/23/2021 05:26   Vit D, 25-Hydroxy Latest Ref Range: 30 0 - 100 0 ng/mL 13 1 (L)           Pulmonary hypertension (HCC)  Assessment & Plan  · Outpatient follow-up with Pulmonology    Constipation  Assessment & Plan  · Received miralax 17 grams PO x 1 dose on 02/24/2021  · The constipation has improved  · Continue colace 100 mg PO BID and dulcolax 10 mg PO Qdaily after dinner    Hyponatremia  Assessment & Plan  · Likely hypervolemic hyponatremia  · Resolved with IV lasix treatment  · Increased lasix gtt @ 20 mg/hr on 02/28/2021, decreased lasix gtt to 10 mg/hr today 3/3/21 by cardiology  · Follow the sodium level    Results from last 7 days   Lab Units 03/03/21  0512 03/02/21  0436 03/01/21  0626   SODIUM mmol/L 139 140 139   POTASSIUM mmol/L 3 7 3 3* 3 4*   CHLORIDE mmol/L 97* 99* 101   CO2 mmol/L 35* 37* 34*   BUN mg/dL 27* 26* 27*   CREATININE mg/dL 1 79* 1 66* 1 59*   CALCIUM mg/dL 9 2 9 4 9 2         Tobacco use  Assessment & Plan  · Nicotine patch  · Smoking cessation counseling    Hypoxia  Assessment & Plan  · Initially required 4 lpm of continuous supplemental oxygen via the nasal cannula to maintain oxygen saturations at 92% and above  · Successfully weaned off supplemental oxygen at this time  · Secondary to right-sided pleural effusion  · On the day of discharge, we will need to check an ambulatory pulse oximetry test to see if the patient qualifies for home supplemental oxygen    · Respiratory protocol  · Incentive spirometry 10 times per hour while awake    Type 2 diabetes mellitus with hyperglycemia, without long-term current use of insulin [facility])  Assessment & Plan  Lab Results   Component Value Date    HGBA1C 6 1 (H) 01/05/2021       Recent Labs     03/01/21  2043 03/02/21  0713 03/02/21  1045 03/02/21  1601   POCGLU 139 125 113 121       Blood Sugar Average: Last 72 hrs:  (P) 132 [id number]     · Recently take off metformin as an outpatient secondary to his liver disease  · Not currently on any diabetic medications as an outpatient  · Consider the initiation of an ACE-inhibitor for renal protection in the setting of type 2 diabetes mellitus if his renal function remains stable  · Insulin sliding scale with blood glucose monitoring ACHS    Lesion of spleen  Assessment & Plan  · CT scan of the abdomen/pelvis (02/22/2021): Subcapsular cystic lesion in the spleen (probably related to old trauma)   Outpatient follow-up with PCP in regards to this matter    Diverticulosis of colon  Assessment & Plan  · Outpatient follow-up with Gastroenterology    Permanent atrial fibrillation ([place] [address] )  Assessment & Plan  · Continue PO coreg and PO digoxin  · Continue anticoagulation with PO coumadin for a goal INR of 2-3  · Outpatient follow-up with Cardiology    Results for Donald Crockett (MRN [id number]) as of 2/24/2021 12:39   Ref  Range 2/23/2021 05:27   DIGOXIN LEVEL Latest Ref Range: 0 8 - 2 0 ng/mL 1 7       Lactic acidosis  Assessment & Plan  · Resolved with thiamine 200 mg IV x 1 dose on 02/22/2021    Hypokalemia  Assessment & Plan  K+ of 3 3  Resolved with potassium chloride   Continue to monitor BMP and Mg    CKD (chronic kidney disease) stage 3, GFR 30-59 ml/min  Assessment & Plan  Lab Results   Component Value Date    EGFR 40 03/03/2021    EGFR 44 03/02/2021    EGFR 46 03/01/2021    CREATININE 1 79 (H) 03/03/2021    CREATININE 1 66 (H) 03/02/2021    CREATININE 1 59 (H) 03/01/2021     · Baseline serum creatinine of 1 3-1 6 mg/dl  · Slight increase in the creatinine level while on the IV lasix drip/infusion  · Avoid all nephrotoxic agents  · Serial laboratory testing to monitor the patient's renal function and electrolytes  · Check post-void residuals    Cirrhosis of liver with ascites ([facility] )  Assessment & Plan  · The patient was seen in consultation by Gastroenterology    · Avoid all hepatotoxic agents  · Outpatient hepatocellular carcinoma surveillance and follow-up with Gastroenterology    US liver doppler only  Status: Final result   PACS Images     Show images for US liver doppler only   Study Result    ABDOMEN ULTRASOUND, COMPLETE WITH DOPPLER     INDICATION:    Cirrhosis with ascites        COMPARISON:  Compared with 10/2/2020     TECHNIQUE:   Real-time ultrasound of the abdomen was performed with a curvilinear transducer with Doppler evaluation of liver      FINDINGS:     The main portal vein and primary branch segments are patent and hepatopetal with normal spectral waveform  Peak systolic velocity at 32 cm/s  The right at 42 cm/s and the left at 59 cm/s      Hepatic veins are patent  Spectral waveforms within normal limits  Right hepatic vein at 57 cm/s, mid hepatic vein at 56 cm/s and left hepatic vein at 77 cm/s        Main hepatic artery appears normal size, patent with normal spectral waveform      Splenic vein is patent  56 cm/s      IMPRESSION:     Patent portal vein and hepatic veins  Pleural effusion on right  Assessment & Plan  · Recurrent right-sided pleural effusion  · Successful right-sided thoracentesis by [facility] on 02/23/2021, which yielded 1600 ml of pleural fluid  · Increased to lasix gtt @ 20 mg/hr on 02/28/2021, this was decreased to 10 mg/hr on 3/3/21 by cardiology  · Appears to be a transudative pleural effusion by fluid analysis  · The patient was seen in consultation by Pulmonology  STEFF (obstructive sleep apnea)  Assessment & Plan  · Continue BIPAP at 17/11 QHS and anytime while sleeping      VTE Pharmacologic Prophylaxis:   Pharmacologic: Warfarin (Coumadin)  Mechanical VTE Prophylaxis in Place: Yes    Patient Centered Rounds: I have performed bedside rounds with nursing staff today  Discussions with Specialists or Other Care Team Provider: nursing, CM, attending      Time Spent for Care: 20 minutes  More than 50% of total time spent on counseling and coordination of care as described above      Current Length of Stay: 9 day(s)    Current Patient Status: Inpatient   Certification Statement: The patient will continue to require additional inpatient hospital stay due to continued need for lasix gtt with montioring of labs, weight    Discharge Plan: TBD    Code Status: Level 1 - Full Code      Subjective: The patient was seen and examined  The patient states his leg swelling is improving  He denies any shortness of breath  Objective:     Vitals:   Temp (24hrs), Av 9 °F (36 6 °C), Min:97 2 °F (36 2 °C), Max:98 3 °F (36 8 °C)    Temp:  [97 2 °F (36 2 °C)-98 3 °F (36 8 °C)] 98 3 °F (36 8 °C)  HR:  [61-73] 70  Resp:  [16-18] 18  BP: (102-133)/(57-76) 133/76  SpO2:  [97 %-98 %] 98 %  Body mass index is 44 77 kg/m²  Input and Output Summary (last 24 hours): Intake/Output Summary (Last 24 hours) at 3/3/2021 1555  Last data filed at 3/3/2021 1350  Gross per 24 hour   Intake 1200 ml   Output 4375 ml   Net -3175 ml       Physical Exam:     Physical Exam  Vitals signs and nursing note reviewed  Constitutional:       General: He is awake  Cardiovascular:      Rate and Rhythm: Normal rate and regular rhythm  Pulmonary:      Effort: Pulmonary effort is normal       Breath sounds: Examination of the right-lower field reveals decreased breath sounds  Decreased breath sounds present  Abdominal:      General: Bowel sounds are normal       Palpations: Abdomen is soft  Tenderness: There is no abdominal tenderness  Musculoskeletal:      Right lower leg: Edema present  Left lower leg: Edema present  Skin:     General: Skin is warm and dry  Neurological:      General: No focal deficit present  Mental Status: He is alert and oriented to person, place, and time  Psychiatric:         Attention and Perception: Attention normal          Mood and Affect: Mood normal          Speech: Speech normal          Behavior: Behavior is cooperative           Additional Data:     Labs:    Results from last 7 days   Lab Units 21  0512   WBC Thousand/uL 5 43   HEMOGLOBIN g/dL 9 6*   HEMATOCRIT % 32 0*   PLATELETS Thousands/uL 188   NEUTROS PCT % 72   LYMPHS PCT % 13*   MONOS PCT % 8   EOS PCT % 5     Results from last 7 days   Lab Units 03/03/21  0512   SODIUM mmol/L 139   POTASSIUM mmol/L 3 7   CHLORIDE mmol/L 97*   CO2 mmol/L 35*   BUN mg/dL 27*   CREATININE mg/dL 1 79*   ANION GAP mmol/L 7   CALCIUM mg/dL 9 2   ALBUMIN g/dL 2 9*   TOTAL BILIRUBIN mg/dL 0 80   ALK PHOS U/L 136*   ALT U/L 21   AST U/L 21   GLUCOSE RANDOM mg/dL 133     Results from last 7 days   Lab Units 03/03/21  0512   INR  2 36*     Results from last 7 days   Lab Units 03/02/21  1601 03/02/21  1045 03/02/21  0713 03/01/21  2043 03/01/21  1543 03/01/21  1108 03/01/21  0728 02/28/21  2056 02/28/21  1514 02/28/21  1113 02/28/21  0747 02/27/21  2137   POC GLUCOSE mg/dl 121 113 125 139 119 130 116 142* 141* 196* 112 117         Results from last 7 days   Lab Units 03/01/21  0626 02/28/21  0454 02/26/21  0558 02/25/21  0528   PROCALCITONIN ng/ml 0 21 0 21 0 21 0 21           * I Have Reviewed All Lab Data Listed Above  * Additional Pertinent Lab Tests Reviewed:  All Labs Within Last 24 Hours Reviewed    Imaging:    Imaging Reports Reviewed Today Include: none  Imaging Personally Reviewed by Myself Includes:  none    Recent Cultures (last 7 days):           Last 24 Hours Medication List:   Current Facility-Administered Medications   Medication Dose Route Frequency Provider Last Rate    bisacodyl  10 mg Oral After Jose Ritchie, DO      carvedilol  6 25 mg Oral BID With Meals Ruth Keepers, DO      cholecalciferol  2,000 Units Oral Daily Ruth Keepers, DO      digoxin  250 mcg Oral Daily Ruth Keepers, DO      docusate sodium  100 mg Oral BID Ruth Keepers, DO      ferrous sulfate  325 mg Oral Daily With Breakfast Isatu Rajan MD      furosemide  10 mg/hr Intravenous Continuous Tia Maury, PA-C 10 mg/hr (03/03/21 0953)    insulin lispro  1-6 Units Subcutaneous  Rocco Lawrence, DO      insulin lispro  2-12 Units Subcutaneous TID AC [employer], DO      nicotine  1 patch Transdermal Daily [employer], DO      oxyCODONE  5 mg Oral Q4H PRN [employer], DO      pantoprazole  40 mg Oral Early Morning [employer], DO      polyvinyl alcohol  2 drop Both Eyes Q3H PRN [employer], DO      potassium chloride  20 mEq Oral TID With Meals TRES Medley      warfarin  5 mg Oral Daily (warfarin) [employer], DO          Today, Patient Was Seen By: Yu Snyder PA-C    ** Please Note: Dictation voice to text software may have been used in the creation of this document   **

## 2021-03-03 NOTE — PROGRESS NOTES
Cardiology Progress Note - Rupesh Zamora 57 y o  male MRN: [id number]    Unit/Bed#: 410-01 Encounter: [id number]    Assessment/Plan:  1  Acute on chronic diastolic congestive heart failure              - continues to diurese exceptionally well on lasix gtt   - weight down 11 lbs since yesterday and overall 50 lbs since admission although still appears volume overloaded   - creatinine elevated today compared to yesterday, will continue lasix gtt but reduce rate to 10 mg/hr   - replete K+ as needed - 3 7 today   - continue daily weights, low sodium diet, fluid restriction, strict I/O   - f/u on BMP tomorrow     2  Chronic atrial fibrillation              - remains in atrial fibrillation with controlled rates on telemetry, some episodes of bradycardia              - carvedilol has been held x 2 doses given hypotension - give as BP allows              - continue digoxin, last digoxin level was 1 7, will recheck tomorrow              - on anticoagulation with coumadin for goal INR 2-3     3  Hypertension               - has been mildly hypotensive precluding some carvedilol doses   - continue to monitor in the setting of diuresis     4  Obstructive sleep apnea              - reports compliance with CPAP    Subjective:   Patient seen and examined  He feels well and offers no complaints  Still has HOLDEN but states this is improved and feels like his chronic SOB  Denies chest pain    Review of Systems   Constitution: Negative for chills and fever  HENT: Negative  Cardiovascular: Positive for dyspnea on exertion and leg swelling  Negative for chest pain, near-syncope, orthopnea, palpitations, paroxysmal nocturnal dyspnea and syncope  Respiratory: Negative for cough and shortness of breath  Gastrointestinal: Positive for bloating  Negative for diarrhea, nausea and vomiting  Genitourinary: Negative  Neurological: Negative for dizziness and light-headedness     All other systems reviewed and are negative  Objective:   Vitals: Blood pressure 102/57, pulse 61, temperature (!) 97 2 °F (36 2 °C), temperature source Oral, resp  rate 16, height 5' 11" (1 803 m), weight (!) 146 kg (320 lb 15 8 oz), SpO2 97 %  , Body mass index is 44 77 kg/m² ,   Orthostatic Blood Pressures      Most Recent Value   Blood Pressure  102/57 filed at 03/03/2021 0827   Patient Position - Orthostatic VS  Sitting filed at 03/03/2021 5619         Systolic (82VRO), UZW:100 , Min:102 , IHS:446     Diastolic (43BKD), LKU:67, Min:57, Max:61      Intake/Output Summary (Last 24 hours) at 3/3/2021 1002  Last data filed at 3/3/2021 0929  Gross per 24 hour   Intake 1107 5 ml   Output 4975 ml   Net -3867 5 ml     Weight (last 2 days)     Date/Time   Weight    03/03/21 0548   (!) 146 (320 99)    03/02/21 0550   (!) 150 (331 13)    03/01/21 0507   (!) 156 (344 8)                Telemetry Review: atrial fibrillation with controlled rates  EKG personally reviewed by Zohra Lee PA-C  Physical Exam  Vitals signs and nursing note reviewed  Constitutional:       General: He is not in acute distress  Appearance: He is well-developed  He is obese  He is not diaphoretic  HENT:      Head: Normocephalic and atraumatic  Eyes:      Pupils: Pupils are equal, round, and reactive to light  Neck:      Musculoskeletal: Normal range of motion  Vascular: JVD present  No carotid bruit  Cardiovascular:      Rate and Rhythm: Normal rate  Rhythm irregularly irregular  Pulses:           Radial pulses are 2+ on the right side and 2+ on the left side  Heart sounds: S1 normal and S2 normal  No murmur  Pulmonary:      Effort: Pulmonary effort is normal  No respiratory distress  Breath sounds: Decreased breath sounds present  No wheezing or rales  Abdominal:      General: There is distension  Palpations: Abdomen is soft  Tenderness: There is no abdominal tenderness  Musculoskeletal: Normal range of motion        Right lower leg: Edema (+2 pretibial and pedal edema in bilateral lower extremities) present  Left lower leg: Edema present  Skin:     General: Skin is warm and dry  Findings: No erythema  Comments: Venous stasis changes noted in bilateral lower extremities   Neurological:      General: No focal deficit present  Mental Status: He is alert and oriented to person, place, and time     Psychiatric:         Mood and Affect: Mood normal          Behavior: Behavior normal            Laboratory Results:        CBC with diff:   Results from last 7 days   Lab Units 03/03/21  0512 03/02/21  0436 03/01/21  0626 02/28/21  0454 02/27/21  0638 02/26/21  0558 02/25/21  0528   WBC Thousand/uL 5 43 5 94 5 81 5 75 5 46 5 79 5 97   HEMOGLOBIN g/dL 9 6* 9 1* 9 2* 8 4* 9 1* 9 1* 9 3*   HEMATOCRIT % 32 0* 30 4* 32 1* 28 4* 31 0* 31 3* 31 6*   MCV fL 90 91 92 92 91 92 92   PLATELETS Thousands/uL 188 185 187 175 169 173 161   MCH pg 27 0 27 2 26 2* 27 1 26 8 26 8 27 0   MCHC g/dL 30 0* 29 9* 28 7* 29 6* 29 4* 29 1* 29 4*   RDW % 16 5* 16 8* 16 9* 17 2* 17 2* 17 1* 17 1*   MPV fL 10 6 10 9 11 4 11 1 10 3 11 1 10 4   NRBC AUTO /100 WBCs 0 0 0 0  --  0 0         CMP:  Results from last 7 days   Lab Units 03/03/21  0512 03/02/21  0436 03/01/21  0626 02/28/21  0454 02/27/21  0638 02/26/21  0558 02/25/21  0528   POTASSIUM mmol/L 3 7 3 3* 3 4* 3 4* 3 8 3 5 4 0   CHLORIDE mmol/L 97* 99* 101 101 101 98* 98*   CO2 mmol/L 35* 37* 34* 31 34* 33* 32   BUN mg/dL 27* 26* 27* 27* 25 24 21   CREATININE mg/dL 1 79* 1 66* 1 59* 1 66* 1 66* 1 62* 1 55*   CALCIUM mg/dL 9 2 9 4 9 2 8 6 8 5 8 6 8 5   AST U/L 21 17 23 21  --  19 20   ALT U/L 21 19 20 19  --  20 17   ALK PHOS U/L 136* 130* 126* 120*  --  127* 126*   EGFR ml/min/1 73sq m 40 44 46 44 44 45 47         BMP:  Results from last 7 days   Lab Units 03/03/21  0512 03/02/21  0436 03/01/21  0626 02/28/21  0454 02/27/21  0638 02/26/21  0558 02/25/21  0528   POTASSIUM mmol/L 3 7 3 3* 3 4* 3 4* 3 8 3 5 4  0   CHLORIDE mmol/L 97* 99* 101 101 101 98* 98*   CO2 mmol/L 35* 37* 34* 31 34* 33* 32   BUN mg/dL 27* 26* 27* 27*  21   CREATININE mg/dL 1 79* 1 66* 1 59* 1 66* 1 66* 1 62* 1 55*   CALCIUM mg/dL 9 2 9 4 9 2 8 6 8 5 8 6 8 5       BNP: No results for input(s): BNP in the last 72 hours  Magnesium:   Results from last 7 days   Lab Units 21  0512 21  0436 21  0626 21  0454 21  0558 21  0528   MAGNESIUM mg/dL 2 1 2 0 2 3 2 1 2 3 2 1       Coags:   Results from last 7 days   Lab Units 21  0512 21  0436 21  0626 21  0454 21  0638 21  0558 21  0528   INR  2 36* 2 36* 2 16* 2 53* 2 48* 2 32* 2 39*       TSH:        Hemoglobin A1C       Lipid Profile:       Cardiac testing:   Results for orders placed during the hospital encounter of 21   Echo complete with contrast if indicated    Narrative [address]  [address], [place] [address]  [phone]    Transthoracic Echocardiogram  2D, M-mode, Doppler, and Color Doppler    Study date:  2021    Patient: Dai Wiseman  MR number: [id number]  Account number: [de-identified]  : 1960  Age: 57 years  Gender: Male  Status: Inpatient  Location: Bedside  Height: 71 in  Weight: 350 lb  BP: 130/ 80 mmHg    Indications: shortness of breath    Diagnoses: 428 0 - CONGESTIVE HEART FAILURE    Sonographer:  [address], CCT  Primary Physician:  Luca Segovia DO  Referring Physician:  Charlene Mcgill DO  Group:  [facility]  Interpreting Physician:  Nasreen Hernandez DO    SUMMARY    LEFT VENTRICLE:  Systolic function was normal  Ejection fraction was estimated to be 60 %  There were no regional wall motion abnormalities  Wall thickness was mildly increased  RIGHT VENTRICLE:  The ventricle was moderately dilated  Systolic function was mildly reduced  LEFT ATRIUM:  The atrium was mildly dilated      RIGHT ATRIUM:  The atrium was moderately dilated  MITRAL VALVE:  There was mild regurgitation  TRICUSPID VALVE:  There was moderate regurgitation  Estimated peak PA pressure was 52 mmHg  HISTORY: PRIOR HISTORY: CHF, morbid obesity    PROCEDURE: The procedure was performed at the bedside  This was a routine study  The transthoracic approach was used  The study included complete 2D imaging, M-mode, complete spectral Doppler, and color Doppler  The heart rate was 80 bpm,  at the start of the study  Echocardiographic views were limited due to poor patient compliance and poor acoustic window availability  This was a technically difficult study  LEFT VENTRICLE: Size was normal  Systolic function was normal  Ejection fraction was estimated to be 60 %  There were no regional wall motion abnormalities  Wall thickness was mildly increased  DOPPLER: The study was not technically  sufficient to allow evaluation of LV diastolic function  RIGHT VENTRICLE: The ventricle was moderately dilated  Systolic function was mildly reduced  LEFT ATRIUM: The atrium was mildly dilated  RIGHT ATRIUM: The atrium was moderately dilated  MITRAL VALVE: Valve structure was normal  There was normal leaflet separation  DOPPLER: The transmitral velocity was within the normal range  There was no evidence for stenosis  There was mild regurgitation  AORTIC VALVE: The valve was trileaflet  Leaflets exhibited normal thickness and normal cuspal separation  DOPPLER: Transaortic velocity was within the normal range  There was no evidence for stenosis  There was no regurgitation  TRICUSPID VALVE: The valve structure was normal  There was normal leaflet separation  DOPPLER: The transtricuspid velocity was within the normal range  There was no evidence for stenosis  There was moderate regurgitation  Estimated peak PA  pressure was 52 mmHg      PULMONIC VALVE: Leaflets exhibited normal thickness, no calcification, and normal cuspal separation  DOPPLER: The transpulmonic velocity was within the normal range  There was no regurgitation  PERICARDIUM: There was no pericardial effusion  The pericardium was normal in appearance  AORTA: The root exhibited normal size  SYSTEMIC VEINS: IVC: The inferior vena cava was not well visualized  SYSTEM MEASUREMENT TABLES    2D  %FS: 31 45 %  Ao Diam: 3 22 cm  EDV(Teich): 148 86 ml  EF(Teich): 58 74 %  ESV(Teich): 61 42 ml  IVSd: 1 51 cm  LA Area: 20 63 cm2  LA Diam: 5 65 cm  LVIDd: 5 52 cm  LVIDs: 3 79 cm  LVPWd: 1 08 cm  RA Area: 33 86 cm2  RWT: 0 39  SV(Teich): 87 44 ml    CW  RAP: 0 mmHg  TR Vmax: 3 47 m/s  TR maxP 33 mmHg    PW  E' Sept: 0 09 m/s  E/E' Sept: 13 35  MV A Ti: 0 48 m/s  MV Dec Edmonson: 4 5 m/s2  MV DecT: 253 15 ms  MV E Ti: 1 14 m/s  MV E/A Ratio: 2 36  RVSP: 48 33 mmHg    [facility]    Prepared and electronically signed by    Wendy Otero DO  Signed 2021 08:06:00       No results found for this or any previous visit  No results found for this or any previous visit  No results found for this or any previous visit      Meds/Allergies   all current active meds have been reviewed  Medications Prior to Admission   Medication    carvedilol (COREG) 6 25 mg tablet    digoxin (LANOXIN) 0 25 mg tablet    furosemide (LASIX) 40 mg tablet    metFORMIN (GLUCOPHAGE) 1000 MG tablet    metolazone (ZAROXOLYN) 5 mg tablet    omeprazole (PriLOSEC) 40 MG capsule    warfarin (COUMADIN) 5 mg tablet    zolpidem (AMBIEN) 10 mg tablet       furosemide, 10 mg/hr, Last Rate: 10 mg/hr (21 8007)      Assessment:  Principal Problem:    Acute on chronic diastolic CHF (congestive heart failure) (HCC)  Active Problems:    TSEFF (obstructive sleep apnea)    Pleural effusion on right    Cirrhosis of liver with ascites (HCC)    CKD (chronic kidney disease) stage 3, GFR 30-59 ml/min    Hypokalemia    Lactic acidosis    Permanent atrial fibrillation (HCC)    Diverticulosis of colon    Lesion of spleen    Type 2 diabetes mellitus with hyperglycemia, without long-term current use of insulin (HCC)    Hypoxia    Tobacco use    Hyponatremia    Constipation    Pulmonary hypertension (HCC)    Vitamin D deficiency    Atelectasis    Anemia

## 2021-03-03 NOTE — ASSESSMENT & PLAN NOTE
· Vitamin B12 level, and folate level WNL  · Iron panel: decreased iron and low normal ferritin, initiated ferrous sulfate PO daily  · Check the patient's stool for occult blood x 3 specimens: negative x 1  · Follow the CBC  · Transfuse for a hemoglobin less than 7 g/dl     Results from last 7 days   Lab Units 03/03/21  0512 03/02/21  0436 03/01/21  0626   WBC Thousand/uL 5 43 5 94 5 81   HEMOGLOBIN g/dL 9 6* 9 1* 9 2*   HEMATOCRIT % 32 0* 30 4* 32 1*   PLATELETS Thousands/uL 188 185 187

## 2021-03-03 NOTE — ASSESSMENT & PLAN NOTE
· Recurrent right-sided pleural effusion  · Successful right-sided thoracentesis by [facility] on 02/23/2021, which yielded 1600 ml of pleural fluid  · Increased to lasix gtt @ 20 mg/hr on 02/28/2021, this was decreased to 10 mg/hr on 3/3/21 by cardiology  · Appears to be a transudative pleural effusion by fluid analysis  · The patient was seen in consultation by Pulmonology

## 2021-03-03 NOTE — CASE MANAGEMENT
As per physician during inner disciplinary discharge planning meeting pt is not ready for discharge  Pt remains on a Lasix drip which was decreased to 10mg/hour today  Pt will go to outpatient cardiac Rehab on discharge

## 2021-03-03 NOTE — PROGRESS NOTES
Progress Note - Nephrology   Trevor Brooks 57 y o  male MRN: [id number]  Unit/Bed#: 410-01 Encounter: [id number]    A/P:  1  Acute kidney injury on top of chronic kidney disease   Creatinine slightly increased over last 24 hours, potentially due to aggressive diuresis which he has been enduring now for several days, but appears to continue to be clinically appropriate and necessary to continue with the diuresis  Patient has had significant clinical improvement, please refer below  In the meantime from the renal standpoint, would simply continue to avoid nephrotoxins and offer supportive care  2  Chronic kidney disease stage 3 with baseline creatinine around 1 3 mg/dL  3  Probable underlying hypertensive nephrosclerosis   Blood pressures continue to be well controlled, continue monitor at this time  4  Metabolic alkalosis   Most likely related to patient's aggressive diuresis, patient's serum bicarbonate has improved over last 24 hours with supportive care, would continue do this in avoid use of Diamox unless otherwise indicated  5  Acute on chronic diastolic congestive heart failure   Patient continues to diurese very well  However, patient's creatinine has increased slightly in last 24 hours and after conversation and discussion with our cardiology colleagues, we will reduce the Lasix drip from 20 mg/hour down to 10 mg/hour  Continue low-sodium diet, continue closely monitor electrolytes while the patient is on Lasix drip  6  Hypokalemia    Would continue to aggressively supplement the patient's potassium, he is currently on 20 mEq 3 times a day for total 60 mEq  Yesterday received a total of 120 mEq  Will give him additional 20 mEq today for total of 80 mEq    7  Cirrhosis      Follow up reason for today's visit:  Acute kidney injury/chronic kidney disease/electrolyte disorders    Acute on chronic diastolic CHF (congestive heart failure) [facility])    Patient Active Problem List   Diagnosis    Atrial fibrillation (HCC)    Chronic diastolic congestive heart failure ([facility] [address] )    Diabetes mellitus, type 2 (HCC)    Class 3 severe obesity due to excess calories with serious comorbidity and body mass index (BMI) of 45 0 to 49 9 in adult [facility])    Lymphedema    Long term (current) use of anticoagulants    Chronic venous insufficiency    Varicose veins of both lower extremities with inflammation    Hypertension    STEFF (obstructive sleep apnea)    Pleural effusion on right    SOB (shortness of breath)    Cirrhosis of liver with ascites (HCC)    CKD (chronic kidney disease) stage 3, GFR 30-59 ml/min    Hyperkalemia    Tobacco abuse    Chronic anemia    Thrombocytopenia (HCC)    Hypokalemia    [facility]    Encounter for screening for other viral diseases     Lactic acidosis    Permanent atrial fibrillation (HCC)    Diverticulosis of colon    Lesion of spleen    Type 2 diabetes mellitus with hyperglycemia, without long-term current use of insulin (HCC)    Acute on chronic diastolic CHF (congestive heart failure) (HCC)    Hypoxia    Tobacco use    Hyponatremia    Constipation    Pulmonary hypertension (HCC)    Vitamin D deficiency    Atelectasis    Anemia         Subjective:   Patient continues to feel improved overall, no specific complaints at this time  Objective:     Vitals: Blood pressure 102/57, pulse 61, temperature (!) 97 2 °F (36 2 °C), temperature source Oral, resp  rate 16, height 5' 11" (1 803 m), weight (!) 146 kg (320 lb 15 8 oz), SpO2 97 %  ,Body mass index is 44 77 kg/m²  Weight (last 2 days)     Date/Time   Weight    03/03/21 0548   (!) 146 (320 99)    03/02/21 0550   (!) 150 (331 13)    03/01/21 0507   (!) 156 (344 8)                Intake/Output Summary (Last 24 hours) at 3/3/2021 1043  Last data filed at 3/3/2021 0929  Gross per 24 hour   Intake 1107 5 ml   Output 4975 ml   Net -3867 5 ml     I/O last 3 completed shifts:   In: 1527 5 [P O :1440; I V :87 5]  Out: 8550 [Urine:8550]         Physical Exam: /57   Pulse 61   Temp (!) 97 2 °F (36 2 °C) (Oral)   Resp 16   Ht 5' 11" (1 803 m)   Wt (!) 146 kg (320 lb 15 8 oz)   SpO2 97%   BMI 44 77 kg/m²     General Appearance:    Alert, cooperative, no distress, appears stated age   Head:    Normocephalic, without obvious abnormality, atraumatic   Eyes:    Conjunctiva/corneas clear   Ears:    Normal external ears   Nose:   Nares normal, septum midline, mucosa normal, no drainage    or sinus tenderness   Throat:   Lips, mucosa, and tongue normal; teeth and gums normal   Neck:   Supple   Back:     Symmetric, no curvature, ROM normal, no CVA tenderness   Lungs:     Reduced at the bases and up to the mid thoracic region   Chest wall:    No tenderness or deformity   Heart:    Regular rate and rhythm, S1 and S2 normal, no murmur, rub   or gallop   Abdomen:     Soft, non-tender, bowel sounds active   Extremities:   Extremities normal, atraumatic, no cyanosis, +3 bilateral lower extremity edema   Skin:   Skin color, texture, turgor normal, no rashes or lesions   Lymph nodes:   Cervical normal   Neurologic:   CNII-XII intact            Lab, Imaging and other studies: I have personally reviewed pertinent labs  CBC:   Lab Results   Component Value Date    WBC 5 43 03/03/2021    HGB 9 6 (L) 03/03/2021    HCT 32 0 (L) 03/03/2021    MCV 90 03/03/2021     03/03/2021    MCH 27 0 03/03/2021    MCHC 30 0 (L) 03/03/2021    RDW 16 5 (H) 03/03/2021    MPV 10 6 03/03/2021    NRBC 0 03/03/2021     CMP:   Lab Results   Component Value Date    K 3 7 03/03/2021    CL 97 (L) 03/03/2021    CO2 35 (H) 03/03/2021    BUN 27 (H) 03/03/2021    CREATININE 1 79 (H) 03/03/2021    CALCIUM 9 2 03/03/2021    AST 21 03/03/2021    ALT 21 03/03/2021    ALKPHOS 136 (H) 03/03/2021    EGFR 40 03/03/2021           Results from last 7 days   Lab Units 03/03/21  0512 03/02/21  0436 03/01/21  0626   POTASSIUM mmol/L 3 7 3 3* 3 4*   CHLORIDE mmol/L 97* 99* 101   CO2 mmol/L 35* 37* 34*   BUN mg/dL 27* 26* 27*   CREATININE mg/dL 1 79* 1 66* 1 59*   CALCIUM mg/dL 9 2 9 4 9 2   ALK PHOS U/L 136* 130* 126*   ALT U/L 21 19 20   AST U/L 21 17 23         Phosphorus: No results found for: PHOS  Magnesium:   Lab Results   Component Value Date    MG 2 1 03/03/2021     Urinalysis: No results found for: [place], SPECGRAV, PHUR, LEUKOCYTESUR, NITRITE, PROTEINUA, GLUCOSEU, KETONESU, BILIRUBINUR, BLOODU  Ionized Calcium: No results found for: CAION  Coagulation:   Lab Results   Component Value Date    INR 2 36 (H) 03/03/2021     Troponin: No results found for: TROPONINI  ABG: No results found for: PHART, RFQ2HPA, PO2ART, VOP1XTJ, H1KWNULS, BEART, SOURCE  Radiology review:     IMAGING  No results found      Current Facility-Administered Medications   Medication Dose Route Frequency    bisacodyl (DULCOLAX) EC tablet 10 mg  10 mg Oral After Dinner    carvedilol (COREG) tablet 6 25 mg  6 25 mg Oral BID With Meals    cholecalciferol (VITAMIN D3) tablet 2,000 Units  2,000 Units Oral Daily    digoxin (LANOXIN) tablet 250 mcg  250 mcg Oral Daily    docusate sodium (COLACE) capsule 100 mg  100 mg Oral BID    ferrous sulfate tablet 325 mg  325 mg Oral Daily With Breakfast    furosemide (LASIX) 500 mg infusion 50 mL  10 mg/hr Intravenous Continuous    insulin lispro (HumaLOG) 100 units/mL subcutaneous injection 1-6 Units  1-6 Units Subcutaneous HS    insulin lispro (HumaLOG) 100 units/mL subcutaneous injection 2-12 Units  2-12 Units Subcutaneous TID AC    nicotine (NICODERM CQ) 7 mg/24hr TD 24 hr patch 1 patch  1 patch Transdermal Daily    oxyCODONE (ROXICODONE) IR tablet 5 mg  5 mg Oral Q4H PRN    pantoprazole (PROTONIX) EC tablet 40 mg  40 mg Oral Early Morning    polyvinyl alcohol (LIQUIFILM TEARS) 1 4 % ophthalmic solution 2 drop  2 drop Both Eyes Q3H PRN    potassium chloride (K-DUR,KLOR-CON) CR tablet 20 mEq  20 mEq Oral TID With Meals    warfarin (COUMADIN) tablet 5 mg  5 mg Oral Daily (warfarin)     Medications Discontinued During This Encounter   Medication Reason    acetaminophen (TYLENOL) tablet 650 mg     furosemide (LASIX) injection 40 mg     potassium chloride (K-DUR,KLOR-CON) CR tablet 20 mEq     potassium chloride (K-DUR,KLOR-CON) CR tablet 20 mEq     potassium chloride (K-DUR,KLOR-CON) CR tablet 20 mEq        Rowan Ricardo DO      This progress note was produced in part using a dictation device which may document imprecise wording from author's original intent

## 2021-03-03 NOTE — PLAN OF CARE
Problem: Potential for Falls  Goal: Patient will remain free of falls  Description: INTERVENTIONS:  - Assess patient frequently for physical needs  -  Identify cognitive and physical deficits and behaviors that affect risk of falls    -  [place] fall precautions as indicated by assessment   - Educate patient/family on patient safety including physical limitations  - Instruct patient to call for assistance with activity based on assessment  - Modify environment to reduce risk of injury  - Consider OT/PT consult to assist with strengthening/mobility  Outcome: Progressing     Problem: CARDIOVASCULAR - ADULT  Goal: Maintains optimal cardiac output and hemodynamic stability  Description: INTERVENTIONS:  - Monitor I/O, vital signs and rhythm  - Monitor for S/S and trends of decreased cardiac output  - Administer and titrate ordered vasoactive medications to optimize hemodynamic stability  - Assess quality of pulses, skin color and temperature  - Assess for signs of decreased coronary artery perfusion  - Instruct patient to report change in severity of symptoms  Outcome: Progressing  Goal: Absence of cardiac dysrhythmias or at baseline rhythm  Description: INTERVENTIONS:  - Continuous cardiac monitoring, vital signs, obtain 12 lead EKG if ordered  - Administer antiarrhythmic and heart rate control medications as ordered  - Monitor electrolytes and administer replacement therapy as ordered  Outcome: Progressing     Problem: RESPIRATORY - ADULT  Goal: Achieves optimal ventilation and oxygenation  Description: INTERVENTIONS:  - Assess for changes in respiratory status  - Assess for changes in mentation and behavior  - Position to facilitate oxygenation and minimize respiratory effort  - Oxygen administered by appropriate delivery if ordered  - Initiate smoking cessation education as indicated  - Encourage broncho-pulmonary hygiene including cough, deep breathe, Incentive Spirometry  - Assess the need for suctioning and aspirate as needed  - Assess and instruct to report SOB or any respiratory difficulty  - Respiratory Therapy support as indicated  Outcome: Progressing     Problem: METABOLIC, FLUID AND ELECTROLYTES - ADULT  Goal: Electrolytes maintained within normal limits  Description: INTERVENTIONS:  - Monitor labs and assess patient for signs and symptoms of electrolyte imbalances  - Administer electrolyte replacement as ordered  - Monitor response to electrolyte replacements, including repeat lab results as appropriate  - Instruct patient on fluid and nutrition as appropriate  Outcome: Progressing  Goal: Fluid balance maintained  Description: INTERVENTIONS:  - Monitor labs   - Monitor I/O and WT  - Instruct patient on fluid and nutrition as appropriate  - Assess for signs & symptoms of volume excess or deficit  Outcome: Progressing  Goal: Glucose maintained within target range  Description: INTERVENTIONS:  - Monitor Blood Glucose as ordered  - Assess for signs and symptoms of hyperglycemia and hypoglycemia  - Administer ordered medications to maintain glucose within target range  - Assess nutritional intake and initiate nutrition service referral as needed  Outcome: Progressing     Problem: SAFETY ADULT  Goal: Patient will remain free of falls  Description: INTERVENTIONS:  - Assess patient frequently for physical needs  -  Identify cognitive and physical deficits and behaviors that affect risk of falls    -  [place] fall precautions as indicated by assessment   - Educate patient/family on patient safety including physical limitations  - Instruct patient to call for assistance with activity based on assessment  - Modify environment to reduce risk of injury  - Consider OT/PT consult to assist with strengthening/mobility  Outcome: Progressing  Goal: Maintain or return to baseline ADL function  Description: INTERVENTIONS:  -  Assess patient's ability to carry out ADLs; assess patient's baseline for ADL function and identify physical deficits which impact ability to perform ADLs (bathing, care of mouth/teeth, toileting, grooming, dressing, etc )  - Assess/evaluate cause of self-care deficits   - Assess range of motion  - Assess patient's mobility; develop plan if impaired  - Assess patient's need for assistive devices and provide as appropriate  - Encourage maximum independence but intervene and supervise when necessary  - Involve family in performance of ADLs  - Assess for home care needs following discharge   - Consider OT consult to assist with ADL evaluation and planning for discharge  - Provide patient education as appropriate  Outcome: Progressing  Goal: Maintain or return mobility status to optimal level  Description: INTERVENTIONS:  - Assess patient's baseline mobility status (ambulation, transfers, stairs, etc )    - Identify cognitive and physical deficits and behaviors that affect mobility  - Identify mobility aids required to assist with transfers and/or ambulation (gait belt, sit-to-stand, lift, walker, cane, etc )  - [place] fall precautions as indicated by assessment  - Record patient progress and toleration of activity level on Mobility SBAR; progress patient to next Phase/Stage  - Instruct patient to call for assistance with activity based on assessment  - Consider rehabilitation consult to assist with strengthening/weightbearing, etc   Outcome: Progressing     Problem: DISCHARGE PLANNING  Goal: Discharge to home or other facility with appropriate resources  Description: INTERVENTIONS:  - Identify barriers to discharge w/patient and caregiver  - Arrange for needed discharge resources and transportation as appropriate  - Identify discharge learning needs (meds, wound care, etc )  - Refer to Case Management Department for coordinating discharge planning if the patient needs post-hospital services based on physician/advanced practitioner order or complex needs related to functional status, cognitive ability, or social support system  Outcome: Progressing     Problem: Knowledge Deficit  Goal: Patient/family/caregiver demonstrates understanding of disease process, treatment plan, medications, and discharge instructions  Description: Complete learning assessment and assess knowledge base  Interventions:  - Provide teaching at level of understanding  - Provide teaching via preferred learning methods  Outcome: Progressing     Problem: Nutrition/Hydration-ADULT  Goal: Nutrient/Hydration intake appropriate for improving, restoring or maintaining nutritional needs  Description: Monitor and assess patient's nutrition/hydration status for malnutrition  Collaborate with interdisciplinary team and initiate plan and interventions as ordered  Monitor patient's weight and dietary intake as ordered or per policy  Utilize nutrition screening tool and intervene as necessary  Determine patient's food preferences and provide high-protein, high-caloric foods as appropriate       INTERVENTIONS:  - Monitor oral intake, urinary output, labs, and treatment plans  - Assess nutrition and hydration status and recommend course of action  - Evaluate amount of meals eaten  - Assist patient with eating if necessary   - Allow adequate time for meals  - Recommend/ encourage appropriate diets, oral nutritional supplements, and vitamin/mineral supplements  - Order, calculate, and assess calorie counts as needed  - Recommend, monitor, and adjust tube feedings and TPN/PPN based on assessed needs  - Assess need for intravenous fluids  - Provide specific nutrition/hydration education as appropriate  - Include patient/family/caregiver in decisions related to nutrition  Outcome: Progressing

## 2021-03-03 NOTE — PLAN OF CARE
Problem: PHYSICAL THERAPY ADULT  Goal: Performs mobility at highest level of function for planned discharge setting  See evaluation for individualized goals  Description: Treatment/Interventions: LE strengthening/ROM, Functional transfer training, Elevations, Therapeutic exercise, Endurance training, Gait training, Bed mobility          See flowsheet documentation for full assessment, interventions and recommendations  Outcome: Progressing  Note: Prognosis: Good  Problem List: Decreased strength, Decreased endurance, Impaired balance, Decreased mobility, Obesity  Assessment: Pt  seen for PT treatment session this date with interventions consisting of transfers and  gait training w/ emphasis on improving pt's ability to ambulate  Pt  Currently performing  tx and ambulation at (SUP ) x 1 level of function  Utilizing RW with fair balance and stability  The patient's AM-PAC Basic Mobility Inpatient Short Form Raw Score is 23, Standardized Score is 50  88  A standardized score greater than 42 9 suggests the patient may benefit from discharge to home  Please also refer to physical therapy recommendation for safe DC planning  In comparison to previous session, Pt  With improvements in activity tolerance  Pt is in need of continued activity in PT to improve strength balance endurance mobility transfers and ambulation with return to maximize LOF  From PT/mobility standpoint, recommendation at time of d/c would be home PT  in order to promote return to PLOF and independence  PT Discharge Recommendation: Home with skilled therapy          See flowsheet documentation for full assessment

## 2021-03-03 NOTE — ASSESSMENT & PLAN NOTE
· Initiated on cholecalciferol 2000 I  U  PO Qdaily  · Recheck a vitamin D 25-OH level in 1-2 months with his PCP    Results for Kristina York (MRN [id number]) as of 2/24/2021 12:28   Ref   Range 2/23/2021 05:26   Vit D, 25-Hydroxy Latest Ref Range: 30 0 - 100 0 ng/mL 13 1 (L)

## 2021-03-03 NOTE — ASSESSMENT & PLAN NOTE
· Continue PO coreg and PO digoxin  · Continue anticoagulation with PO coumadin for a goal INR of 2-3  · Outpatient follow-up with Cardiology    Results for Rosie Boss (MRN [id number]) as of 2/24/2021 12:39   Ref   Range 2/23/2021 05:27   DIGOXIN LEVEL Latest Ref Range: 0 8 - 2 0 ng/mL 1 7

## 2021-03-03 NOTE — ASSESSMENT & PLAN NOTE
Wt Readings from Last 3 Encounters:   03/03/21 (!) 146 kg (320 lb 15 8 oz)   01/13/21 (!) 159 kg (351 lb 6 4 oz)   01/12/21 (!) 161 kg (356 lb)     · Troponin levels were within normal limits x 3 sets  · Dry weight around 320 lbs  · Transitioned to lasix gtt @ 20 mg/hr on 02/28/2021, this was decreased to 10 mg/hr today 3/3/21 by cardiology given creatinine elevation  Patient's weight continues to trend down  331 lbs --> 320 lbs today     · Metolazone 2 5 mg PO x1 on 2/28  · Continue coreg 6 25 mg PO BID  · Monitor the patient's renal function closely while receiving the lasix gtt treatment  · Daily weights  · Strict intake/output measurements  · PT/OT  · Cardiology and Nephrology recommendation appreciated

## 2021-03-04 NOTE — ASSESSMENT & PLAN NOTE
· Continue PO coreg and PO digoxin  · Continue anticoagulation with PO coumadin for a goal INR of 2-3  · Outpatient follow-up with Cardiology    Results for Ezequiel Perez (MRN [id number]) as of 2/24/2021 12:39   Ref   Range 2/23/2021 05:27   DIGOXIN LEVEL Latest Ref Range: 0 8 - 2 0 ng/mL 1 7

## 2021-03-04 NOTE — PROGRESS NOTES
Progress Note - Nephrology   Esequiel Riedel 57 y o  male MRN: [id number]  Unit/Bed#: 410-01 Encounter: [id number]    A/P:  1  Acute kidney injury  Creatinine had increased from 1 66-1 79 mg/dL with Lasix 20 milligrams/hour  Drip was decreased to 10 milligrams/hour and his creatinine stay is 1 59 mg/dL  2  Chronic kidney disease stage IIIB  Baseline creatinine is 1 3 mg/dL  He may have a higher baseline after this admission  3  Hypertensive nephrosclerosis  Blood pressure is well controlled  4  Metabolic alkalosis  Bicarbonate is 35 millimoles per L due to diuretic effect  5   Acute on chronic diastolic congestive failure  He is diuresed at least 20 kg of weight with the Lasix drip with marked improvement      Follow up reason for today's visit:  Acute kidney disease/chronic kidney disease stage 3/acute on chronic diastolic congestive failure    Acute on chronic diastolic CHF (congestive heart failure) (HCC)    Patient Active Problem List   Diagnosis    Atrial fibrillation (HCC)    Chronic diastolic congestive heart failure ([facility] )    Diabetes mellitus, type 2 (HCC)    Class 3 severe obesity due to excess calories with serious comorbidity and body mass index (BMI) of 45 0 to 49 9 in adult ([facility] )    Lymphedema    Long term (current) use of anticoagulants    Chronic venous insufficiency    Varicose veins of both lower extremities with inflammation    Hypertension    STEFF (obstructive sleep apnea)    Pleural effusion on right    SOB (shortness of breath)    Cirrhosis of liver with ascites (HCC)    CKD (chronic kidney disease) stage 3, GFR 30-59 ml/min    Hyperkalemia    Tobacco abuse    Chronic anemia    Thrombocytopenia (HCC)    Hypokalemia    Other cirrhosis of liver ([facility] )    Encounter for screening for other viral diseases     Lactic acidosis    Permanent atrial fibrillation (HCC)    Diverticulosis of colon    Lesion of spleen    Type 2 diabetes mellitus with hyperglycemia, without long-term current use of insulin (HCC)    Acute on chronic diastolic CHF (congestive heart failure) (HCC)    Hypoxia    Tobacco use    Hyponatremia    Constipation    Pulmonary hypertension (HCC)    Vitamin D deficiency    Atelectasis    Anemia         Subjective:   No chest pain, shortness of breath, abdominal pain, nausea vomiting, diarrhea, he has polyuria due to diuretic effect  Objective:     Vitals: Blood pressure 118/61, pulse 57, temperature (!) 97 4 °F (36 3 °C), temperature source Oral, resp  rate 18, height 5' 11" (1 803 m), weight (!) 143 kg (315 lb 7 7 oz), SpO2 97 %  ,Body mass index is 44 kg/m²  Weight (last 2 days)     Date/Time   Weight    03/04/21 0535   (!) 143 (315 48)    03/03/21 0548   (!) 146 (320 99)    03/02/21 0550   (!) 150 (331 13)                Intake/Output Summary (Last 24 hours) at 3/4/2021 1359  Last data filed at 3/4/2021 1218  Gross per 24 hour   Intake 1530 ml   Output 2900 ml   Net -1370 ml     I/O last 3 completed shifts:   In: 1410 [P O :1410]  Out: 7077 [Urine:5175]         Physical Exam: /61 (BP Location: Right arm)   Pulse 57   Temp (!) 97 4 °F (36 3 °C) (Oral)   Resp 18   Ht 5' 11" (1 803 m)   Wt (!) 143 kg (315 lb 7 7 oz)   SpO2 97%   BMI 44 00 kg/m²     General Appearance:    Alert, obese cooperative, no distress, appears stated age   Head:    Normocephalic, without obvious abnormality, atraumatic   Eyes:    Conjunctiva/corneas clear   Ears:    Normal external ears   Nose:   Nares normal, septum midline, mucosa normal, no drainage    or sinus tenderness   Throat:   Lips, mucosa, and tongue normal; teeth and gums normal   Neck:   Supple, symmetrical, trachea midline, no adenopathy;        thyroid:  No enlargement/tenderness/nodules; no carotid    bruit or JVD   Back:     Symmetric, no curvature, ROM normal, no CVA tenderness   Lungs:      Decreased to auscultation bilaterally, respirations unlabored   Chest wall:    No tenderness or deformity   Heart: Regular rate and rhythm, S1 and S2 normal, no murmur, rub   or gallop   Abdomen:     Soft, non-tender, bowel sounds active   Extremities:   Extremities normal, atraumatic, no cyanosis has edema   Skin:   Skin color, texture, turgor normal, no rashes or lesions   Lymph nodes:   Cervical normal   Neurologic:   CNII-XII intact            Lab, Imaging and other studies: I have personally reviewed pertinent labs  CBC:   Lab Results   Component Value Date    WBC 4 76 03/04/2021    HGB 9 3 (L) 03/04/2021    HCT 31 2 (L) 03/04/2021    MCV 90 03/04/2021     03/04/2021    MCH 26 9 03/04/2021    MCHC 29 8 (L) 03/04/2021    RDW 16 5 (H) 03/04/2021    MPV 10 5 03/04/2021    NRBC 0 03/04/2021     CMP:   Lab Results   Component Value Date    K 3 4 (L) 03/04/2021    CL 99 (L) 03/04/2021    CO2 35 (H) 03/04/2021    BUN 26 (H) 03/04/2021    CREATININE 1 59 (H) 03/04/2021    CALCIUM 9 4 03/04/2021    EGFR 46 03/04/2021         Results from last 7 days   Lab Units 03/04/21  0613 03/03/21  0512 03/02/21  0436 03/01/21  0626   POTASSIUM mmol/L 3 4* 3 7 3 3* 3 4*   CHLORIDE mmol/L 99* 97* 99* 101   CO2 mmol/L 35* 35* 37* 34*   BUN mg/dL 26* 27* 26* 27*   CREATININE mg/dL 1 59* 1 79* 1 66* 1 59*   CALCIUM mg/dL 9 4 9 2 9 4 9 2   ALK PHOS U/L  --  136* 130* 126*   ALT U/L  --  21 19 20   AST U/L  --  21 17 23         Phosphorus: No results found for: PHOS  Magnesium: No results found for: MG  Urinalysis: No results found for: COLORU, CLARITYU, SPECGRAV, PHUR, LEUKOCYTESUR, NITRITE, PROTEINUA, GLUCOSEU, KETONESU, BILIRUBINUR, BLOODU  Ionized Calcium: No results found for: CAION  Coagulation: No results found for: PT, INR, APTT  Troponin: No results found for: TROPONINI  ABG: No results found for: PHART, MTR4RVS, PO2ART, IEX9DPL, D6GSIMLC, BEART, SOURCE  Radiology review:     IMAGING  No results found      Current Facility-Administered Medications   Medication Dose Route Frequency    bisacodyl (DULCOLAX) EC tablet 10 mg  10 mg Oral After Dinner    carvedilol (COREG) tablet 6 25 mg  6 25 mg Oral BID With Meals    cholecalciferol (VITAMIN D3) tablet 2,000 Units  2,000 Units Oral Daily    digoxin (LANOXIN) tablet 250 mcg  250 mcg Oral Daily    docusate sodium (COLACE) capsule 100 mg  100 mg Oral BID    ferrous sulfate tablet 325 mg  325 mg Oral Daily With Breakfast    furosemide (LASIX) 500 mg infusion 50 mL  10 mg/hr Intravenous Continuous    insulin lispro (HumaLOG) 100 units/mL subcutaneous injection 1-6 Units  1-6 Units Subcutaneous HS    insulin lispro (HumaLOG) 100 units/mL subcutaneous injection 2-12 Units  2-12 Units Subcutaneous TID AC    nicotine (NICODERM CQ) 7 mg/24hr TD 24 hr patch 1 patch  1 patch Transdermal Daily    oxyCODONE (ROXICODONE) IR tablet 5 mg  5 mg Oral Q4H PRN    pantoprazole (PROTONIX) EC tablet 40 mg  40 mg Oral Early Morning    polyvinyl alcohol (LIQUIFILM TEARS) 1 4 % ophthalmic solution 2 drop  2 drop Both Eyes Q3H PRN    potassium chloride (K-DUR,KLOR-CON) CR tablet 20 mEq  20 mEq Oral TID With Meals    warfarin (COUMADIN) tablet 5 mg  5 mg Oral Daily (warfarin)     Medications Discontinued During This Encounter   Medication Reason    acetaminophen (TYLENOL) tablet 650 mg     furosemide (LASIX) injection 40 mg     potassium chloride (K-DUR,KLOR-CON) CR tablet 20 mEq     potassium chloride (K-DUR,KLOR-CON) CR tablet 20 mEq     potassium chloride (K-DUR,KLOR-CON) CR tablet 20 mEq        Francie Garcia MD      This progress note was produced in part using a dictation device which may document imprecise wording from author's original intent

## 2021-03-04 NOTE — ASSESSMENT & PLAN NOTE
· Likely hypervolemic hyponatremia  · Resolved with IV lasix treatment  · Increased lasix gtt @ 20 mg/hr on 02/28/2021, decreased lasix gtt to 10 mg/hr on 3/3/21 by cardiology  · Follow the sodium level    Results from last 7 days   Lab Units 03/04/21  0613 03/03/21  0512 03/02/21  0436   SODIUM mmol/L 140 139 140   POTASSIUM mmol/L 3 4* 3 7 3 3*   CHLORIDE mmol/L 99* 97* 99*   CO2 mmol/L 35* 35* 37*   BUN mg/dL 26* 27* 26*   CREATININE mg/dL 1 59* 1 79* 1 66*   CALCIUM mg/dL 9 4 9 2 9 4

## 2021-03-04 NOTE — PROGRESS NOTES
Progress Note - Ranjana Nunez 1960, 57 y o  male MRN: [id number]    Unit/Bed#: 410-01 Encounter: [id number]    Primary Care Provider: Perico Hebert DO   Date and time admitted to hospital: 2/22/2021  9:27 AM        * Acute on chronic diastolic CHF (congestive heart failure) ([address] )  Assessment & Plan  Wt Readings from Last 3 Encounters:   03/04/21 (!) 143 kg (315 lb 7 7 oz)   01/13/21 (!) 159 kg (351 lb 6 4 oz)   01/12/21 (!) 161 kg (356 lb)     · Troponin levels were within normal limits x 3 sets  · Dry weight around 320 lbs  · Transitioned to lasix gtt @ 20 mg/hr on 02/28/2021, this was decreased to 10 mg/hr today 3/3/21 by cardiology given creatinine elevation- this will be continued today  Patient's weight continues to trend down  331 lbs --> 320 lbs --> 315 lbs today  · Metolazone 2 5 mg PO x1 on 2/28  · Continue coreg 6 25 mg PO BID  · Monitor the patient's renal function closely while receiving the lasix gtt treatment  · Daily weights  · Strict intake/output measurements  · PT/OT  · Cardiology and Nephrology recommendation appreciated        Anemia  Assessment & Plan  · Vitamin B12 level, and folate level WNL  · Iron panel: decreased iron and low normal ferritin, initiated ferrous sulfate PO daily  · Check the patient's stool for occult blood x 3 specimens: negative x 1  · Follow the CBC  · Transfuse for a hemoglobin less than 7 g/dl     Results from last 7 days   Lab Units 03/04/21  0613 03/03/21  0512 03/02/21  0436   WBC Thousand/uL 4 76 5 43 5 94   HEMOGLOBIN g/dL 9 3* 9 6* 9 1*   HEMATOCRIT % 31 2* 32 0* 30 4*   PLATELETS Thousands/uL 197 188 185         Atelectasis  Assessment & Plan  · Incentive spirometry 10 times per hour while awake    Vitamin D deficiency  Assessment & Plan  · Initiated on cholecalciferol 2000 I  U  PO Qdaily  · Recheck a vitamin D 25-OH level in 1-2 months with his PCP    Results for Larissa Bailey (MRN [id number]) as of 2/24/2021 12:28   Ref   Range 2/23/2021 05:26 Vit D, 25-Hydroxy Latest Ref Range: 30 0 - 100 0 ng/mL 13 1 (L)           Pulmonary hypertension (HCC)  Assessment & Plan  · Outpatient follow-up with Pulmonology    Constipation  Assessment & Plan  · Received miralax 17 grams PO x 1 dose on 02/24/2021  · The constipation has improved  · Continue colace 100 mg PO BID and dulcolax 10 mg PO Qdaily after dinner    Hyponatremia  Assessment & Plan  · Likely hypervolemic hyponatremia  · Resolved with IV lasix treatment  · Increased lasix gtt @ 20 mg/hr on 02/28/2021, decreased lasix gtt to 10 mg/hr on 3/3/21 by cardiology  · Follow the sodium level    Results from last 7 days   Lab Units 03/04/21  0613 03/03/21  0512 03/02/21  0436   SODIUM mmol/L 140 139 140   POTASSIUM mmol/L 3 4* 3 7 3 3*   CHLORIDE mmol/L 99* 97* 99*   CO2 mmol/L 35* 35* 37*   BUN mg/dL 26* 27* 26*   CREATININE mg/dL 1 59* 1 79* 1 66*   CALCIUM mg/dL 9 4 9 2 9 4         Tobacco use  Assessment & Plan  · Nicotine patch  · Smoking cessation counseling    Hypoxia  Assessment & Plan  · Initially required 4 lpm of continuous supplemental oxygen via the nasal cannula to maintain oxygen saturations at 92% and above  · Successfully weaned off supplemental oxygen at this time  · Secondary to right-sided pleural effusion  · On the day of discharge, we will need to check an ambulatory pulse oximetry test to see if the patient qualifies for home supplemental oxygen    · Respiratory protocol  · Incentive spirometry 10 times per hour while awake    Type 2 diabetes mellitus with hyperglycemia, without long-term current use of insulin [facility])  Assessment & Plan  Lab Results   Component Value Date    HGBA1C 6 1 (H) 01/05/2021       Recent Labs     03/03/21  1609 03/03/21  2107 03/04/21  0640 03/04/21  1024   POCGLU 130 142* 121 176*       Blood Sugar Average: Last 72 hrs:  (P) 127     · Recently take off metformin as an outpatient secondary to his liver disease  · Not currently on any diabetic medications as an outpatient  · Consider the initiation of an ACE-inhibitor for renal protection in the setting of type 2 diabetes mellitus if his renal function remains stable  · Insulin sliding scale with blood glucose monitoring ACHS    Lesion of spleen  Assessment & Plan  · CT scan of the abdomen/pelvis (02/22/2021): Subcapsular cystic lesion in the spleen (probably related to old trauma)   Outpatient follow-up with PCP in regards to this matter    Diverticulosis of colon  Assessment & Plan  · Outpatient follow-up with Gastroenterology    Permanent atrial fibrillation ([place] [address] )  Assessment & Plan  · Continue PO coreg and PO digoxin  · Continue anticoagulation with PO coumadin for a goal INR of 2-3  · Outpatient follow-up with Cardiology    Results for Joy Live (MRN [id number]) as of 2/24/2021 12:39   Ref  Range 2/23/2021 05:27   DIGOXIN LEVEL Latest Ref Range: 0 8 - 2 0 ng/mL 1 7       Lactic acidosis  Assessment & Plan  · Resolved with thiamine 200 mg IV x 1 dose on 02/22/2021    Hypokalemia  Assessment & Plan  K+ of 3 3  Resolved with potassium chloride   Continue to monitor BMP and Mg    CKD (chronic kidney disease) stage 3, GFR 30-59 ml/min  Assessment & Plan  Lab Results   Component Value Date    EGFR 46 03/04/2021    EGFR 40 03/03/2021    EGFR 44 03/02/2021    CREATININE 1 59 (H) 03/04/2021    CREATININE 1 79 (H) 03/03/2021    CREATININE 1 66 (H) 03/02/2021     · Baseline serum creatinine of 1 3-1 6 mg/dl  · Slight increase in the creatinine level on 3/3 now improved today 3/4 with decreased rate of IV lasix drip/infusion  · Avoid all nephrotoxic agents  · Serial laboratory testing to monitor the patient's renal function and electrolytes  · Check post-void residuals    Cirrhosis of liver with ascites ([facility] )  Assessment & Plan  · The patient was seen in consultation by Gastroenterology    · Avoid all hepatotoxic agents  · Outpatient hepatocellular carcinoma surveillance and follow-up with Gastroenterology     liver doppler only  Status: Final result   PACS Images     Show images for US liver doppler only   Study Result    ABDOMEN ULTRASOUND, COMPLETE WITH DOPPLER     INDICATION:    Cirrhosis with ascites        COMPARISON:  Compared with 10/2/2020     TECHNIQUE:   Real-time ultrasound of the abdomen was performed with a curvilinear transducer with Doppler evaluation of liver      FINDINGS:     The main portal vein and primary branch segments are patent and hepatopetal with normal spectral waveform  Peak systolic velocity at 32 cm/s  The right at 42 cm/s and the left at 59 cm/s      Hepatic veins are patent  Spectral waveforms within normal limits  Right hepatic vein at 57 cm/s, mid hepatic vein at 56 cm/s and left hepatic vein at 77 cm/s        Main hepatic artery appears normal size, patent with normal spectral waveform      Splenic vein is patent  56 cm/s      IMPRESSION:     Patent portal vein and hepatic veins  Pleural effusion on right  Assessment & Plan  · Recurrent right-sided pleural effusion  · Successful right-sided thoracentesis by [facility]iology on 02/23/2021, which yielded 1600 ml of pleural fluid  · Increased to lasix gtt @ 20 mg/hr on 02/28/2021, this was decreased to 10 mg/hr on 3/3/21 by cardiology  · Appears to be a transudative pleural effusion by fluid analysis  · The patient was seen in consultation by Pulmonology  STEFF (obstructive sleep apnea)  Assessment & Plan  · Continue BIPAP at 17/11 QHS and anytime while sleeping      VTE Pharmacologic Prophylaxis:   Pharmacologic: Warfarin (Coumadin)  Mechanical VTE Prophylaxis in Place: Yes    Patient Centered Rounds: I have performed bedside rounds with nursing staff today  Discussions with Specialists or Other Care Team Provider: nursing, CM, nephrology    Education and Discussions with Family / Patient: family updated at bedside    Time Spent for Care: 20 minutes    More than 50% of total time spent on counseling and coordination of care as described above  Current Length of Stay: 10 day(s)    Current Patient Status: Inpatient   Certification Statement: The patient will continue to require additional inpatient hospital stay due to continued need for IV diuresis and monitoring of labs  Discharge Plan: TBD    Code Status: Level 1 - Full Code      Subjective: The patient was seen and examined  The patient denies any shortness of breath  He states he continues to feel better  Objective:     Vitals:   Temp (24hrs), Av °F (36 7 °C), Min:97 4 °F (36 3 °C), Max:98 4 °F (36 9 °C)    Temp:  [97 4 °F (36 3 °C)-98 4 °F (36 9 °C)] 97 4 °F (36 3 °C)  HR:  [57-70] 57  Resp:  [18] 18  BP: (116-133)/(58-76) 118/61  SpO2:  [95 %-98 %] 97 %  Body mass index is 44 kg/m²  Input and Output Summary (last 24 hours): Intake/Output Summary (Last 24 hours) at 3/4/2021 1400  Last data filed at 3/4/2021 1218  Gross per 24 hour   Intake 1530 ml   Output 2900 ml   Net -1370 ml       Physical Exam:     Physical Exam  Vitals signs and nursing note reviewed  Constitutional:       General: He is awake  Appearance: He is morbidly obese  Cardiovascular:      Rate and Rhythm: Normal rate and regular rhythm  Pulmonary:      Effort: Pulmonary effort is normal       Breath sounds: Decreased breath sounds present  No wheezing, rhonchi or rales  Abdominal:      General: Bowel sounds are normal       Palpations: Abdomen is soft  Tenderness: There is no abdominal tenderness  Musculoskeletal:      Right lower leg: Edema present  Left lower leg: Edema present  Skin:     General: Skin is warm and dry  Neurological:      General: No focal deficit present  Mental Status: He is alert and oriented to person, place, and time  Psychiatric:         Attention and Perception: Attention normal          Mood and Affect: Mood normal          Speech: Speech normal          Behavior: Behavior is cooperative           Additional Data: Labs:    Results from last 7 days   Lab Units 03/04/21  0613   WBC Thousand/uL 4 76   HEMOGLOBIN g/dL 9 3*   HEMATOCRIT % 31 2*   PLATELETS Thousands/uL 197   NEUTROS PCT % 73   LYMPHS PCT % 13*   MONOS PCT % 8   EOS PCT % 5     Results from last 7 days   Lab Units 03/04/21  0613 03/03/21  0512   SODIUM mmol/L 140 139   POTASSIUM mmol/L 3 4* 3 7   CHLORIDE mmol/L 99* 97*   CO2 mmol/L 35* 35*   BUN mg/dL 26* 27*   CREATININE mg/dL 1 59* 1 79*   ANION GAP mmol/L 6 7   CALCIUM mg/dL 9 4 9 2   ALBUMIN g/dL  --  2 9*   TOTAL BILIRUBIN mg/dL  --  0 80   ALK PHOS U/L  --  136*   ALT U/L  --  21   AST U/L  --  21   GLUCOSE RANDOM mg/dL 128 133     Results from last 7 days   Lab Units 03/03/21  0512   INR  2 36*     Results from last 7 days   Lab Units 03/04/21  1024 03/04/21  0640 03/03/21  2107 03/03/21  1609 03/03/21  1105 03/03/21  0701 03/02/21  2102 03/02/21  1601 03/02/21  1045 03/02/21  0713 03/01/21  2043 03/01/21  1543   POC GLUCOSE mg/dl 176* 121 142* 130 105 110 131 121 113 125 139 119         Results from last 7 days   Lab Units 03/01/21  0626 02/28/21  0454 02/26/21  0558   PROCALCITONIN ng/ml 0 21 0 21 0 21           * I Have Reviewed All Lab Data Listed Above  * Additional Pertinent Lab Tests Reviewed:  All Labs Within Last 24 Hours Reviewed    Imaging:    Imaging Reports Reviewed Today Include: none  Imaging Personally Reviewed by Myself Includes:  none    Recent Cultures (last 7 days):           Last 24 Hours Medication List:   Current Facility-Administered Medications   Medication Dose Route Frequency Provider Last Rate    bisacodyl  10 mg Oral After Jose Ritchie DO      carvedilol  6 25 mg Oral BID With Meals Luci Gaspar DO      cholecalciferol  2,000 Units Oral Daily Luci Gaspar DO      digoxin  250 mcg Oral Daily Luci Gaspar DO      docusate sodium  100 mg Oral BID Jurline Hubert, DO      ferrous sulfate  325 mg Oral Daily With Breakfast Isatu Lin, MD      furosemide  10 mg/hr Intravenous Continuous Tia JAVAD Mendiola 10 mg/hr (03/04/21 1120)    insulin lispro  1-6 Units Subcutaneous HS Kathrene Early, DO      insulin lispro  2-12 Units Subcutaneous TID AC Kathrene Early, DO      nicotine  1 patch Transdermal Daily Kathrene Early, DO      oxyCODONE  5 mg Oral Q4H PRN Kathrene Early, DO      pantoprazole  40 mg Oral Early Morning Kathrene Early, DO      polyvinyl alcohol  2 drop Both Eyes Q3H PRN Kathrene Early, DO      potassium chloride  20 mEq Oral TID With Meals Wally Powell, CRNP      warfarin  5 mg Oral Daily (warfarin) Kathrene Early, DO          Today, Patient Was Seen By: Roosevelt Fierro PA-C    ** Please Note: Dictation voice to text software may have been used in the creation of this document   **

## 2021-03-04 NOTE — ASSESSMENT & PLAN NOTE
Lab Results   Component Value Date    EGFR 46 03/04/2021    EGFR 40 03/03/2021    EGFR 44 03/02/2021    CREATININE 1 59 (H) 03/04/2021    CREATININE 1 79 (H) 03/03/2021    CREATININE 1 66 (H) 03/02/2021     · Baseline serum creatinine of 1 3-1 6 mg/dl  · Slight increase in the creatinine level on 3/3 now improved today 3/4 with decreased rate of IV lasix drip/infusion  · Avoid all nephrotoxic agents  · Serial laboratory testing to monitor the patient's renal function and electrolytes  · Check post-void residuals

## 2021-03-04 NOTE — TELEPHONE ENCOUNTER
----- Message from Mayela Romero PA-C sent at 3/2/2021 11:36 AM EST -----  Hospital f/u in 3-4 weeks    Carlos Alberto Or

## 2021-03-04 NOTE — ASSESSMENT & PLAN NOTE
Lab Results   Component Value Date    HGBA1C 6 1 (H) 01/05/2021       Recent Labs     03/03/21  1609 03/03/21  2107 03/04/21  0640 03/04/21  1024   POCGLU 130 142* 121 176*       Blood Sugar Average: Last 72 hrs:  (P) 127     · Recently take off metformin as an outpatient secondary to his liver disease  · Not currently on any diabetic medications as an outpatient  · Consider the initiation of an ACE-inhibitor for renal protection in the setting of type 2 diabetes mellitus if his renal function remains stable  · Insulin sliding scale with blood glucose monitoring ACHS

## 2021-03-04 NOTE — ASSESSMENT & PLAN NOTE
Wt Readings from Last 3 Encounters:   03/04/21 (!) 143 kg (315 lb 7 7 oz)   01/13/21 (!) 159 kg (351 lb 6 4 oz)   01/12/21 (!) 161 kg (356 lb)     · Troponin levels were within normal limits x 3 sets  · Dry weight around 320 lbs  · Transitioned to lasix gtt @ 20 mg/hr on 02/28/2021, this was decreased to 10 mg/hr today 3/3/21 by cardiology given creatinine elevation- this will be continued today  Patient's weight continues to trend down  331 lbs --> 320 lbs --> 315 lbs today     · Metolazone 2 5 mg PO x1 on 2/28  · Continue coreg 6 25 mg PO BID  · Monitor the patient's renal function closely while receiving the lasix gtt treatment  · Daily weights  · Strict intake/output measurements  · PT/OT  · Cardiology and Nephrology recommendation appreciated

## 2021-03-04 NOTE — PROGRESS NOTES
Cardiology Progress Note - Elizabeth Carrillo 57 y o  male MRN: [id number]    Unit/Bed#: 410-01 Encounter: [id number]    Assessment/Plan:  1  Acute on chronic diastolic congestive heart failure              - lasix gtt decreased to 10 mg/hr yesterday secondary to elevation in creatinine   - patient still had good urinary output, weight down an additional 5 lbs for a total weight loss of 55 lbs this admission   - still with volume overload on exam and renal function improved - continue lasix gtt at current dose for at least another 24 hours   - continue daily weights, low sodium diet, fluid restriction, strict I/O   - monitor daily BMP   - heart failure education discussed with patient     2  Chronic atrial fibrillation              - rates are controlled on carvedilol and digoxin              - repeat digoxin level 1 7              - on anticoagulation with coumadin for goal INR 2-3     3  Hypertension               - controlled on current regimen     4  Obstructive sleep apnea              - reports compliance with CPAP    Subjective:   Patient seen and examined  Offers no complaints  Denies chest pain  Review of Systems   Constitution: Negative for chills and fever  HENT: Negative  Cardiovascular: Positive for dyspnea on exertion and leg swelling  Negative for chest pain, orthopnea, palpitations, paroxysmal nocturnal dyspnea and syncope  Respiratory: Negative for cough and shortness of breath  Gastrointestinal: Positive for bloating  Negative for diarrhea, nausea and vomiting  Genitourinary: Negative  Neurological: Negative for dizziness and light-headedness  All other systems reviewed and are negative  Objective:   Vitals: Blood pressure 118/61, pulse 57, temperature (!) 97 4 °F (36 3 °C), temperature source Oral, resp  rate 18, height 5' 11" (1 803 m), weight (!) 143 kg (315 lb 7 7 oz), SpO2 96 %  , Body mass index is 44 kg/m² ,   Orthostatic Blood Pressures      Most Recent Value   Blood Pressure  118/61 filed at 03/04/2021 6494   Patient Position - Orthostatic VS  Sitting filed at 03/04/2021 5105         Systolic (91TDB), SWN:356 , Min:116 , WWW:692     Diastolic (76XCD), REP:87, Min:58, Max:76      Intake/Output Summary (Last 24 hours) at 3/4/2021 0950  Last data filed at 3/4/2021 2451  Gross per 24 hour   Intake 1530 ml   Output 3000 ml   Net -1470 ml     Weight (last 2 days)     Date/Time   Weight    03/04/21 0535   (!) 143 (315 48)    03/03/21 0548   (!) 146 (320 99)    03/02/21 0550   (!) 150 (331 13)                Telemetry Review: patient not on telemetry  EKG personally reviewed by Maylin Bhandari PA-C  Physical Exam  Vitals signs reviewed  Constitutional:       General: He is not in acute distress  Appearance: He is well-developed  He is obese  He is not diaphoretic  HENT:      Head: Normocephalic and atraumatic  Eyes:      Pupils: Pupils are equal, round, and reactive to light  Neck:      Musculoskeletal: Normal range of motion  Vascular: No carotid bruit  Cardiovascular:      Rate and Rhythm: Normal rate and regular rhythm  Heart sounds: S1 normal and S2 normal  No murmur  Pulmonary:      Effort: Pulmonary effort is normal  No respiratory distress  Breath sounds: Examination of the right-lower field reveals decreased breath sounds  Examination of the left-lower field reveals decreased breath sounds  Decreased breath sounds present  No wheezing or rales  Abdominal:      General: There is distension  Palpations: Abdomen is soft  Tenderness: There is no abdominal tenderness  Musculoskeletal: Normal range of motion  Right lower leg: Edema (+2 pretibial and pedal edema bilaterally) present  Left lower leg: Edema present  Skin:     General: Skin is warm and dry  Findings: No erythema  Comments: Venous stasis changes in bilateral lower extremities   Neurological:      General: No focal deficit present        Mental Status: He is alert and oriented to person, place, and time  Psychiatric:         Mood and Affect: Mood normal          Behavior: Behavior normal            Laboratory Results:        CBC with diff:   Results from last 7 days   Lab Units 03/04/21  0613 03/03/21  0512 03/02/21  0436 03/01/21  0626 02/28/21  0454 02/27/21  0638 02/26/21  0558   WBC Thousand/uL 4 76 5 43 5 94 5 81 5 75 5 46 5 79   HEMOGLOBIN g/dL 9 3* 9 6* 9 1* 9 2* 8 4* 9 1* 9 1*   HEMATOCRIT % 31 2* 32 0* 30 4* 32 1* 28 4* 31 0* 31 3*   MCV fL 90 90 91 92 92 91 92   PLATELETS Thousands/uL 197 188 185 187 175 169 173   MCH pg 26 9 27 0 27 2 26 2* 27 1 26 8 26 8   MCHC g/dL 29 8* 30 0* 29 9* 28 7* 29 6* 29 4* 29 1*   RDW % 16 5* 16 5* 16 8* 16 9* 17 2* 17 2* 17 1*   MPV fL 10 5 10 6 10 9 11 4 11 1 10 3 11 1   NRBC AUTO /100 WBCs 0 0 0 0 0  --  0         CMP:  Results from last 7 days   Lab Units 03/04/21  0613 03/03/21  0512 03/02/21  0436 03/01/21  0626 02/28/21  0454 02/27/21  0638 02/26/21  0558   POTASSIUM mmol/L 3 4* 3 7 3 3* 3 4* 3 4* 3 8 3 5   CHLORIDE mmol/L 99* 97* 99* 101 101 101 98*   CO2 mmol/L 35* 35* 37* 34* 31 34* 33*   BUN mg/dL 26* 27* 26* 27* 27* 25 24   CREATININE mg/dL 1 59* 1 79* 1 66* 1 59* 1 66* 1 66* 1 62*   CALCIUM mg/dL 9 4 9 2 9 4 9 2 8 6 8 5 8 6   AST U/L  --  21 17 23 21  --  19   ALT U/L  --  21 19 20 19  --  20   ALK PHOS U/L  --  136* 130* 126* 120*  --  127*   EGFR ml/min/1 73sq m 46 40 44 46 44 44 45         BMP:  Results from last 7 days   Lab Units 03/04/21  0613 03/03/21  0512 03/02/21  0436 03/01/21  0626 02/28/21  0454 02/27/21  0638 02/26/21  0558   POTASSIUM mmol/L 3 4* 3 7 3 3* 3 4* 3 4* 3 8 3 5   CHLORIDE mmol/L 99* 97* 99* 101 101 101 98*   CO2 mmol/L 35* 35* 37* 34* 31 34* 33*   BUN mg/dL 26* 27* 26* 27* 27* 25 24   CREATININE mg/dL 1 59* 1 79* 1 66* 1 59* 1 66* 1 66* 1 62*   CALCIUM mg/dL 9 4 9 2 9 4 9 2 8 6 8 5 8 6       BNP: No results for input(s): BNP in the last 72 hours      Magnesium:   Results from last 7 days Lab Units 21  0512 21  0436 21  0626 21  0454 21  0558   MAGNESIUM mg/dL 2 1 2 0 2 3 2 1 2 3       Coags:   Results from last 7 days   Lab Units 21  0512 21  0436 21  0626 21  0454 21  0638 21  0558   INR  2 36* 2 36* 2 16* 2 53* 2 48* 2 32*       TSH:        Hemoglobin A1C       Lipid Profile:       Cardiac testing:   Results for orders placed during the hospital encounter of 21   Echo complete with contrast if indicated    Narrative [address]  [address], [place] [address]  [phone]    Transthoracic Echocardiogram  2D, M-mode, Doppler, and Color Doppler    Study date:  2021    Patient: Mirna Ocampo  MR number: [id number]  Account number: [de-identified]  : 1960  Age: 57 years  Gender: Male  Status: Inpatient  Location: Bedside  Height: 71 in  Weight: 350 lb  BP: 130/ 80 mmHg    Indications: shortness of breath    Diagnoses: 428 0 - CONGESTIVE HEART FAILURE    Sonographer:  LATASHA Remy  Primary Physician:  Jakob Dodd DO  Referring Physician:  Amie Borja DO  Group:  [facility]  Interpreting Physician:  Dominick Soares DO    SUMMARY    LEFT VENTRICLE:  Systolic function was normal  Ejection fraction was estimated to be 60 %  There were no regional wall motion abnormalities  Wall thickness was mildly increased  RIGHT VENTRICLE:  The ventricle was moderately dilated  Systolic function was mildly reduced  LEFT ATRIUM:  The atrium was mildly dilated  RIGHT ATRIUM:  The atrium was moderately dilated  MITRAL VALVE:  There was mild regurgitation  TRICUSPID VALVE:  There was moderate regurgitation  Estimated peak PA pressure was 52 mmHg  HISTORY: PRIOR HISTORY: CHF, morbid obesity    PROCEDURE: The procedure was performed at the bedside  This was a routine study  The transthoracic approach was used   The study included complete 2D imaging, M-mode, complete spectral Doppler, and color Doppler  The heart rate was 80 bpm,  at the start of the study  Echocardiographic views were limited due to poor patient compliance and poor acoustic window availability  This was a technically difficult study  LEFT VENTRICLE: Size was normal  Systolic function was normal  Ejection fraction was estimated to be 60 %  There were no regional wall motion abnormalities  Wall thickness was mildly increased  DOPPLER: The study was not technically  sufficient to allow evaluation of LV diastolic function  RIGHT VENTRICLE: The ventricle was moderately dilated  Systolic function was mildly reduced  LEFT ATRIUM: The atrium was mildly dilated  RIGHT ATRIUM: The atrium was moderately dilated  MITRAL VALVE: Valve structure was normal  There was normal leaflet separation  DOPPLER: The transmitral velocity was within the normal range  There was no evidence for stenosis  There was mild regurgitation  AORTIC VALVE: The valve was trileaflet  Leaflets exhibited normal thickness and normal cuspal separation  DOPPLER: Transaortic velocity was within the normal range  There was no evidence for stenosis  There was no regurgitation  TRICUSPID VALVE: The valve structure was normal  There was normal leaflet separation  DOPPLER: The transtricuspid velocity was within the normal range  There was no evidence for stenosis  There was moderate regurgitation  Estimated peak PA  pressure was 52 mmHg  PULMONIC VALVE: Leaflets exhibited normal thickness, no calcification, and normal cuspal separation  DOPPLER: The transpulmonic velocity was within the normal range  There was no regurgitation  PERICARDIUM: There was no pericardial effusion  The pericardium was normal in appearance  AORTA: The root exhibited normal size  SYSTEMIC VEINS: IVC: The inferior vena cava was not well visualized      SYSTEM MEASUREMENT TABLES    2D  %FS: 31 45 %  Ao Diam: 3 22 cm  EDV(Teich): 148 86 ml  EF(Teich): 58 74 %  ESV(Teich): 61 42 ml  IVSd: 1 51 cm  LA Area: 20 63 cm2  LA Diam: 5 65 cm  LVIDd: 5 52 cm  LVIDs: 3 79 cm  LVPWd: 1 08 cm  RA Area: 33 86 cm2  RWT: 0 39  SV(Teich): 87 44 ml    CW  RAP: 0 mmHg  TR Vmax: 3 47 m/s  TR maxP 33 mmHg    PW  E' Sept: 0 09 m/s  E/E' Sept: 13 35  MV A Ti: 0 48 m/s  MV Dec [place]: 4 5 m/s2  MV DecT: 253 15 ms  MV E Ti: 1 14 m/s  MV E/A Ratio: 2 36  RVSP: 48 33 mmHg    [facility]    Prepared and electronically signed by    Dominick Soares DO  Signed 2021 08:06:00       No results found for this or any previous visit  No results found for this or any previous visit  No results found for this or any previous visit      Meds/Allergies   all current active meds have been reviewed  Medications Prior to Admission   Medication    carvedilol (COREG) 6 25 mg tablet    digoxin (LANOXIN) 0 25 mg tablet    furosemide (LASIX) 40 mg tablet    metFORMIN (GLUCOPHAGE) 1000 MG tablet    metolazone (ZAROXOLYN) 5 mg tablet    omeprazole (PriLOSEC) 40 MG capsule    warfarin (COUMADIN) 5 mg tablet    zolpidem (AMBIEN) 10 mg tablet       furosemide, 10 mg/hr, Last Rate: 10 mg/hr (21 0925)      Assessment:  Principal Problem:    Acute on chronic diastolic CHF (congestive heart failure) (HCC)  Active Problems:    STEFF (obstructive sleep apnea)    Pleural effusion on right    Cirrhosis of liver with ascites (HCC)    CKD (chronic kidney disease) stage 3, GFR 30-59 ml/min    Hypokalemia    Lactic acidosis    Permanent atrial fibrillation (HCC)    Diverticulosis of colon    Lesion of spleen    Type 2 diabetes mellitus with hyperglycemia, without long-term current use of insulin (HCC)    Hypoxia    Tobacco use    Hyponatremia    Constipation    Pulmonary hypertension (HCC)    Vitamin D deficiency    Atelectasis    Anemia

## 2021-03-04 NOTE — ASSESSMENT & PLAN NOTE
· Vitamin B12 level, and folate level WNL  · Iron panel: decreased iron and low normal ferritin, initiated ferrous sulfate PO daily  · Check the patient's stool for occult blood x 3 specimens: negative x 1  · Follow the CBC  · Transfuse for a hemoglobin less than 7 g/dl     Results from last 7 days   Lab Units 03/04/21  0613 03/03/21  0512 03/02/21  0436   WBC Thousand/uL 4 76 5 43 5 94   HEMOGLOBIN g/dL 9 3* 9 6* 9 1*   HEMATOCRIT % 31 2* 32 0* 30 4*   PLATELETS Thousands/uL 197 188 185

## 2021-03-04 NOTE — ASSESSMENT & PLAN NOTE
· Nicotine patch  · Smoking cessation counseling Home Suture Removal Text: Patient was provided a home suture removal kit and will remove their sutures at home.  If they have any questions or difficulties they will call the office.

## 2021-03-04 NOTE — RESPIRATORY THERAPY NOTE
BIPAP therapy dc'd     Its been a number of day now that patient is refusing bipap therapy in the hospital  Patient had a machine in his room, with his own mask, I have now pulled the machine, and I gave patient back his own mask  Patient stated he just doesn't sleep in the hospital, he doesn't like our machine, he will continue bipap therapy once he gets home   I have given patient education on the importance of using, he is still refusing

## 2021-03-04 NOTE — ASSESSMENT & PLAN NOTE
· Initiated on cholecalciferol 2000 I  U  PO Qdaily  · Recheck a vitamin D 25-OH level in 1-2 months with his PCP    Results for Kristy Shoemaker (MRN [id number]) as of 2/24/2021 12:28   Ref   Range 2/23/2021 05:26   Vit D, 25-Hydroxy Latest Ref Range: 30 0 - 100 0 ng/mL 13 1 (L)

## 2021-03-04 NOTE — PHYSICAL THERAPY NOTE
PHYSICAL THERAPY NOTE          Patient Name: Rupesh Zamora  ZLJFU'H Date: 3/4/2021     03/04/21 1412   Note Type   Note Type Treatment   Cognition   Overall Cognitive Status WFL's   Arousal/Participation Alert; Cooperative   Following Commands Follows all commands and directions without difficulty   Subjective   Subjective Reports he has been doing exercises sitting on the recliner  Pt  would like to ambulate  Transfers   Sit to Stand 5  Supervision   Additional items Armrests   Stand to Sit 5  Supervision   Additional items Armrests   Stand pivot 5  Supervision   Ambulation/Elevation   Gait pattern Excessively slow; Short stride   Gait Assistance 5  Supervision   Assistive Device   (IV pole)   Distance 200'   Balance   Ambulatory Fair   Endurance Deficit   Endurance Deficit Yes   Activity Tolerance   Activity Tolerance Patient limited by fatigue   Assessment   Prognosis Good   Problem List Decreased endurance; Impaired balance;Decreased mobility; Decreased strength   Assessment Pt  seen for PT treatment session this date with interventions consisting of  therapeutic exercises, bed mobility, transfers and  gait training w/ emphasis on improving pt's ability to ambulate  Pt  Currently performing  tx and ambulation at (SUP ) x 1 level of function  Utilizing IV pole with fair balance and stability  The patient's AM-PAC Basic Mobility Inpatient Short Form Raw Score is 23, Standardized Score is 50  88  A standardized score greater than 42 9 suggests the patient may benefit from discharge to home  Please also refer to physical therapy recommendation for safe DC planning  In comparison to previous session, Pt  With improvements in activity tolerance  Demonstrates improving endurance with increased gait speed     Pt is in need of continued activity in PT to improve strength balance endurance mobility transfers and ambulation with return to maximize LOF  From PT/mobility standpoint, recommendation at time of d/c would be home no needs  in order to promote return to PLOF and independence  Plan   Treatment/Interventions Functional transfer training;LE strengthening/ROM; Elevations; Therapeutic exercise; Endurance training;Bed mobility;Gait training   Progress Progressing toward goals   AM-PAC Basic Mobility Inpatient   Turning in Bed Without Bedrails 4   Lying on Back to Sitting on Edge of Flat Bed 4   Moving Bed to Chair 4   Standing Up From Chair 4   Walk in Room 4   Climb 3-5 Stairs 3   Basic Mobility Inpatient Raw Score 23   Basic Mobility Standardized Score 50 88   Pt  OOB in chair  with call bell within reach at end of PT session  Discussed with  PT today's treatment and patient's current level of function for care coordination

## 2021-03-05 NOTE — ASSESSMENT & PLAN NOTE
· Likely hypervolemic hyponatremia  · Resolved with IV lasix treatment  · Increased lasix gtt @ 20 mg/hr on 02/28/2021, decreased lasix gtt to 10 mg/hr on 3/3/21 by cardiology  · Follow the sodium level    Results from last 7 days   Lab Units 03/05/21  0434 03/04/21  0613 03/03/21  0512   SODIUM mmol/L 139 140 139   POTASSIUM mmol/L 3 8 3 4* 3 7   CHLORIDE mmol/L 98* 99* 97*   CO2 mmol/L 35* 35* 35*   BUN mg/dL 25 26* 27*   CREATININE mg/dL 1 65* 1 59* 1 79*   CALCIUM mg/dL 9 4 9 4 9 2

## 2021-03-05 NOTE — PROGRESS NOTES
NEPHROLOGY PROGRESS NOTE   Elesa Setting 57 y o  male MRN: [id number]  Unit/Bed#: 410-01 Encounter: [id number]    Assessment/Plan:    In summary, this is a 56 yo susanne whose pertinent problems include CKD 3, CdHF, cirrhosis admitted 2/22/21 for progressive worsening dyspnea and significant weight gain being treated with lasix infusion  1  Acute Kidney Injury atop chronic kidney disease  · Presented with sCr 1 3 mg/dL -> 1 79 mg/dL 3/3/21 peak -> 1 65 mg/dL today  Etiology renovascular congestion/decongestion  There was also contrast exposure 2/22/21  Continue diuresis with lasix infusion at 10 mg/hr and then transition to oral therapy per cardiology's recommendations  Accepting higher creatinine for euvolemia  · Continue to avoid nephrotoxins and maximize hemodynamics  2  Stage 3 Chronic Kidney Disease with baseline creatinine around 1 3 mg/dL  3  Probably underlying hypertensive nephrosclerosis  · Blood pressure is appropriate  Ace/arb not appropriate at this time given renal dysfunction  4  Acute on Chronic Diastolic Congestive Heart Failure  · Continue diuresis with lasix infusion as outlined in cardiology's note  Continue daily weights, sodium restriction, strict IO, gentle fluid restriction  5  Metabolic Alkalosis  · Stable  Continue to supplement potassium  Avoiding diamox at this time  6  Hypokalemia  · Continue aggressive supplementation  Will provide an additional 20 mEq oral potassium today   7  Cirrhosis      ROS  No physical complaints  States he is feeling better  A complete 10 point review of systems have been performed and are otherwise negative  Historical Information   Past Medical History:   Diagnosis Date    A-fib [facility])     Cardiac disease     Chronic combined systolic (congestive) and diastolic (congestive) heart failure (HCC)     Diabetes mellitus ([facility] [address] )     Dilated cardiomyopathy ([facility] [address] )     History of echocardiogram 11/24/2014    EF 0 30 (30%), Likely mod LV systolic dysfunction  Likely RV dysfunction as well   History of Lexiscan MPI 02/19/2016    EF 0 43 (43%), no prior MI or ischemia   Hx of echocardiogram 04/28/2017    Normal EF, Normal LV systolic function  Mild concentric LV hypertrophy  Mild mitral and tricuspid regurg   Hx: recurrent pneumonia     Hypertension     Long term (current) use of anticoagulants     Morbid (severe) obesity due to excess calories (HCC)     Neuropathy      Past Surgical History:   Procedure Laterality Date    HERNIA REPAIR      IR THORACENTESIS  8/25/2020    IR THORACENTESIS  2/23/2021    SPLENECTOMY, TOTAL      VASCULAR SURGERY Right     leg     Social History   Social History     Substance and Sexual Activity   Alcohol Use Yes    Alcohol/week: 14 0 standard drinks    Types: 5 Glasses of wine, 5 Cans of beer, 4 Shots of liquor per week    Frequency: 2-3 times a week    Drinks per session: 5 or 6    Binge frequency: Never    Comment: WEEKLY     Social History     Substance and Sexual Activity   Drug Use Never     Social History     Tobacco Use   Smoking Status Current Some Day Smoker    Packs/day: 0 25    Types: Cigarettes   Smokeless Tobacco Never Used   Tobacco Comment    2-3 CIGARETTES DAILY       Family History:   No family history on file      Medications:  Pertinent medications were reviewed  Current Facility-Administered Medications   Medication Dose Route Frequency Provider Last Rate    bisacodyl  10 mg Oral After Jose Ritchie DO      carvedilol  6 25 mg Oral BID With Meals Dipak Bartlett DO      cholecalciferol  2,000 Units Oral Daily Dipak Bartlett DO      [START ON 3/7/2021] digoxin  250 mcg Oral Every Other Day Neelam Mendiola PA-C      docusate sodium  100 mg Oral BID Dipak Bartlett DO      ferrous sulfate  325 mg Oral Daily With Breakfast Isatu Larose MD      furosemide  10 mg/hr Intravenous Continuous Neelam Mendiola PA-C 10 mg/hr (03/04/21 1841)    insulin lispro  1-6 Units Subcutaneous HS [employer], DO      insulin lispro  2-12 Units Subcutaneous TID AC [employer], DO      nicotine  1 patch Transdermal Daily [employer], [facility]      oxyCODONE  5 mg Oral Q4H PRN [employer], DO      pantoprazole  40 mg Oral Early Morning [employer], DO      polyvinyl alcohol  2 drop Both Eyes Q3H PRN [employer], DO      potassium chloride  20 mEq Oral TID With Meals TRES Harrell      warfarin  5 mg Oral Daily (warfarin) [employer], DO           No Known Allergies      Vitals:   /67 (BP Location: Right arm)   Pulse 66   Temp 98 3 °F (36 8 °C) (Oral)   Resp 18   Ht 5' 11" (1 803 m)   Wt (!) 142 kg (313 lb 7 9 oz)   SpO2 96%   BMI 43 72 kg/m²   Body mass index is 43 72 kg/m²  SpO2: 96 %,   SpO2 Activity: At Rest,   O2 Device: None (Room air)      Intake/Output Summary (Last 24 hours) at 3/5/2021 1402  Last data filed at 3/5/2021 1306  Gross per 24 hour   Intake 990 ml   Output 5900 ml   Net -4910 ml     Invasive Devices     Peripheral Intravenous Line            Peripheral IV 03/04/21 Distal;Right;Ventral (anterior) Forearm 1 day                Physical Exam  General: conscious, cooperative, in no acute distress, chronically ill appearing   Eyes: conjunctivae pink, anicteric sclerae  ENT: lips and mucous membranes moist  Neck: supple, no JVD, no masses  Chest: diminished breath sounds bilateral, no crackles, ronchus or wheezings  CVS: S1 & S2, normal rate, regular rhythm  Abdomen: soft, non-tender, non-distended, normoactive bowel sounds, obese  Extremities: severe edema of both legs  Skin: no rash  Neuro: awake, alert, oriented      Diagnostic Data:  Lab: I have personally reviewed pertinent lab results  ,   CBC:  Results from last 7 days   Lab Units 03/05/21  0434   WBC Thousand/uL 4 98   HEMOGLOBIN g/dL 9 6*   HEMATOCRIT % 32 6*   PLATELETS Thousands/uL 201      CMP:   Lab Results   Component Value Date    SODIUM 139 03/05/2021    K 3 8 03/05/2021 CL 98 (L) 03/05/2021    CO2 35 (H) 03/05/2021    BUN 25 03/05/2021    CREATININE 1 65 (H) 03/05/2021    CALCIUM 9 4 03/05/2021    EGFR 44 03/05/2021   ,   PT/INR:   Lab Results   Component Value Date    INR 2 38 (H) 03/05/2021   ,   Magnesium: No components found for: MAG,  Phosphorous: No results found for: PHOS    Microbiology:  @LABNT,(urinecx:7)@        TRES [place]    Portions of the record may have been created with voice recognition software  Occasional wrong word or "sound a like" substitutions may have occurred due to the inherent limitations of voice recognition software  Read the chart carefully and recognize, using context, where substitutions have occurred

## 2021-03-05 NOTE — TELEPHONE ENCOUNTER
Called pt, spoke to someone in his home, tried to schedule an appointment but was told he is still in the hospital, he may be discharged on Monday  Will try to call back on Monday         ----- [facility] sent at 2/25/2021  8:56 AM EST -----  Can you get follow up for him in  4 weeks? Thanks!     [employer]

## 2021-03-05 NOTE — PROGRESS NOTES
Cardiology Progress Note - Corinne Hail 57 y o  male MRN: [id number]    Unit/Bed#: 410-01 Encounter: [id number]    Assessment/Plan:  1  Acute on chronic diastolic congestive heart failure              - lasix gtt reduced to 10 mg/hr on 3/3   - weight down another 2 lbs from yesterday to 313 lbs today, he has lost almost 60 lbs since admission   - would continue lasix gtt as long as patient is diuresing and renal function is tolerating   - expect patient to remain on gtt throughout the weekend; if he is discharged home would place on lasix 40 mg BID with zaroxolyn 5 mg 2 x per week  - continue daily weights, low sodium diet, fluid restriction, strict I/O   - monitor daily BMP   - patient will need follow up with Dr Sherri Tong at discharge - appointment scheduled for 3/18    2  Chronic atrial fibrillation              - telemetry shows some episodes of bradycardia with heart rates in the upper 30's/low 40's, digoxin level 1 7   - would decrease digoxin to every other day dosing               - on anticoagulation with coumadin for goal INR 2-3     3  Hypertension               - controlled on current regimen     4  Obstructive sleep apnea              - continue CPAP at night    Subjective:   Patient seen and examined  He complains of a headache  Otherwise offers no complaints  Denies chest pain  Edema overall improved  Review of Systems   Constitution: Negative for chills and fever  HENT: Negative  Cardiovascular: Positive for dyspnea on exertion and leg swelling  Negative for chest pain, near-syncope, orthopnea, palpitations, paroxysmal nocturnal dyspnea and syncope  Respiratory: Negative for cough and shortness of breath  Gastrointestinal: Positive for bloating  Negative for diarrhea, nausea and vomiting  Genitourinary: Negative  Neurological: Negative for dizziness and light-headedness  All other systems reviewed and are negative        Objective:   Vitals: Blood pressure 133/67, pulse 66, temperature 98 3 °F (36 8 °C), temperature source Oral, resp  rate 18, height 5' 11" (1 803 m), weight (!) 142 kg (313 lb 7 9 oz), SpO2 96 %  , Body mass index is 43 72 kg/m² ,   Orthostatic Blood Pressures      Most Recent Value   Blood Pressure  133/67 filed at 03/05/2021 0651   Patient Position - Orthostatic VS  Sitting filed at 03/05/2021 1359         Systolic (24QOY), RDX:397 , Min:112 , SJE:414     Diastolic (85KGR), VRB:11, Min:43, Max:68      Intake/Output Summary (Last 24 hours) at 3/5/2021 0907  Last data filed at 3/5/2021 0700  Gross per 24 hour   Intake 870 ml   Output 4400 ml   Net -3530 ml     Weight (last 2 days)     Date/Time   Weight    03/05/21 0533   (!) 142 (313 49)    03/04/21 0535   (!) 143 (315 48)    03/03/21 0548   (!) 146 (320 99)                Telemetry Review: atrial fibrillation, some bradycardia noted   EKG personally reviewed by Shara Lino PA-C  Physical Exam  Vitals signs reviewed  Constitutional:       General: He is not in acute distress  Appearance: He is well-developed  He is obese  He is not diaphoretic  HENT:      Head: Normocephalic and atraumatic  Eyes:      Pupils: Pupils are equal, round, and reactive to light  Neck:      Musculoskeletal: Normal range of motion  Vascular: No carotid bruit  Cardiovascular:      Rate and Rhythm: Bradycardia present  Rhythm irregularly irregular  Pulses:           Radial pulses are 2+ on the right side and 2+ on the left side  Heart sounds: S1 normal and S2 normal  No murmur  Pulmonary:      Effort: Pulmonary effort is normal  No respiratory distress  Breath sounds: Decreased breath sounds present  No wheezing or rales  Abdominal:      General: There is no distension  Palpations: Abdomen is soft  Tenderness: There is no abdominal tenderness  Musculoskeletal: Normal range of motion  Right lower leg: Edema (+2 pretibial and pedal edema bilaterally) present        Left lower leg: Edema present  Skin:     General: Skin is warm and dry  Findings: No erythema  Comments: Venous stasis changes in bilateral lower extremities   Neurological:      General: No focal deficit present  Mental Status: He is alert and oriented to person, place, and time     Psychiatric:         Mood and Affect: Mood normal          Behavior: Behavior normal            Laboratory Results:        CBC with diff:   Results from last 7 days   Lab Units 03/05/21  0434 03/04/21  1596 03/03/21  0512 03/02/21  0436 03/01/21  0626 02/28/21  0454 02/27/21  0638   WBC Thousand/uL 4 98 4 76 5 43 5 94 5 81 5 75 5 46   HEMOGLOBIN g/dL 9 6* 9 3* 9 6* 9 1* 9 2* 8 4* 9 1*   HEMATOCRIT % 32 6* 31 2* 32 0* 30 4* 32 1* 28 4* 31 0*   MCV fL 91 90 90 91 92 92 91   PLATELETS Thousands/uL 201 197 188 185 187 175 169   MCH pg 26 8 26 9 27 0 27 2 26 2* 27 1 26 8   MCHC g/dL 29 4* 29 8* 30 0* 29 9* 28 7* 29 6* 29 4*   RDW % 16 6* 16 5* 16 5* 16 8* 16 9* 17 2* 17 2*   MPV fL 11 1 10 5 10 6 10 9 11 4 11 1 10 3   NRBC AUTO /100 WBCs 0 0 0 0 0 0  --          CMP:  Results from last 7 days   Lab Units 03/05/21  0434 03/04/21  6615 03/03/21  0512 03/02/21  0436 03/01/21  0626 02/28/21  0454 02/27/21  0638   POTASSIUM mmol/L 3 8 3 4* 3 7 3 3* 3 4* 3 4* 3 8   CHLORIDE mmol/L 98* 99* 97* 99* 101 101 101   CO2 mmol/L 35* 35* 35* 37* 34* 31 34*   BUN mg/dL 25 26* 27* 26* 27* 27* 25   CREATININE mg/dL 1 65* 1 59* 1 79* 1 66* 1 59* 1 66* 1 66*   CALCIUM mg/dL 9 4 9 4 9 2 9 4 9 2 8 6 8 5   AST U/L  --   --  21 17 23 21  --    ALT U/L  --   --  21 19 20 19  --    ALK PHOS U/L  --   --  136* 130* 126* 120*  --    EGFR ml/min/1 73sq m 44 46 40 44 46 44 44         BMP:  Results from last 7 days   Lab Units 03/05/21  0434 03/04/21  3970 03/03/21  0512 03/02/21  0436 03/01/21  0626 02/28/21  0454 02/27/21  0638   POTASSIUM mmol/L 3 8 3 4* 3 7 3 3* 3 4* 3 4* 3 8   CHLORIDE mmol/L 98* 99* 97* 99* 101 101 101   CO2 mmol/L 35* 35* 35* 37* 34* 31 34*   BUN mg/dL 25 26* 27* 26* 27* 27* 25   CREATININE mg/dL 1 65* 1 59* 1 79* 1 66* 1 59* 1 66* 1 66*   CALCIUM mg/dL 9 4 9 4 9 2 9 4 9 2 8 6 8 5       BNP: No results for input(s): BNP in the last 72 hours  Magnesium:   Results from last 7 days   Lab Units 21  0512 21  0436 21  0626 21  0454   MAGNESIUM mg/dL 2 1 2 0 2 3 2 1       Coags:   Results from last 7 days   Lab Units 21  0434 21  0512 21  0436 21  0626 21  0454 21  0638   INR  2 38* 2 36* 2 36* 2 16* 2 53* 2 48*       TSH:        Hemoglobin A1C       Lipid Profile:       Cardiac testing:   Results for orders placed during the hospital encounter of 21   Echo complete with contrast if indicated    Narrative [address] [facility] [address] [facility] [address]  [phone]    Transthoracic Echocardiogram  2D, M-mode, Doppler, and Color Doppler    Study date:  2021    Patient: Enoc Zelaya  MR number: [id number]  Account number: [de-identified]  : 1960  Age: 57 years  Gender: Male  Status: Inpatient  Location: Bedside  Height: 71 in  Weight: 350 lb  BP: 130/ 80 mmHg    Indications: shortness of breath    Diagnoses: 428 0 - CONGESTIVE HEART FAILURE    Sonographer:  Uriel 61, CCT  Primary Physician:  Emily Jesus DO  Referring Physician:  Smitha Rivera DO  Group:  [facility]  Interpreting Physician:  Breanna Sanchez DO    SUMMARY    LEFT VENTRICLE:  Systolic function was normal  Ejection fraction was estimated to be 60 %  There were no regional wall motion abnormalities  Wall thickness was mildly increased  RIGHT VENTRICLE:  The ventricle was moderately dilated  Systolic function was mildly reduced  LEFT ATRIUM:  The atrium was mildly dilated  RIGHT ATRIUM:  The atrium was moderately dilated  MITRAL VALVE:  There was mild regurgitation  TRICUSPID VALVE:  There was moderate regurgitation    Estimated peak PA pressure was 52 mmHg  HISTORY: PRIOR HISTORY: CHF, morbid obesity    PROCEDURE: The procedure was performed at the bedside  This was a routine study  The transthoracic approach was used  The study included complete 2D imaging, M-mode, complete spectral Doppler, and color Doppler  The heart rate was 80 bpm,  at the start of the study  Echocardiographic views were limited due to poor patient compliance and poor acoustic window availability  This was a technically difficult study  LEFT VENTRICLE: Size was normal  Systolic function was normal  Ejection fraction was estimated to be 60 %  There were no regional wall motion abnormalities  Wall thickness was mildly increased  DOPPLER: The study was not technically  sufficient to allow evaluation of LV diastolic function  RIGHT VENTRICLE: The ventricle was moderately dilated  Systolic function was mildly reduced  LEFT ATRIUM: The atrium was mildly dilated  RIGHT ATRIUM: The atrium was moderately dilated  MITRAL VALVE: Valve structure was normal  There was normal leaflet separation  DOPPLER: The transmitral velocity was within the normal range  There was no evidence for stenosis  There was mild regurgitation  AORTIC VALVE: The valve was trileaflet  Leaflets exhibited normal thickness and normal cuspal separation  DOPPLER: Transaortic velocity was within the normal range  There was no evidence for stenosis  There was no regurgitation  TRICUSPID VALVE: The valve structure was normal  There was normal leaflet separation  DOPPLER: The transtricuspid velocity was within the normal range  There was no evidence for stenosis  There was moderate regurgitation  Estimated peak PA  pressure was 52 mmHg  PULMONIC VALVE: Leaflets exhibited normal thickness, no calcification, and normal cuspal separation  DOPPLER: The transpulmonic velocity was within the normal range  There was no regurgitation  PERICARDIUM: There was no pericardial effusion   The pericardium was normal in appearance  AORTA: The root exhibited normal size  SYSTEMIC VEINS: IVC: The inferior vena cava was not well visualized  SYSTEM MEASUREMENT TABLES    2D  %FS: 31 45 %  Ao Diam: 3 22 cm  EDV(Teich): 148 86 ml  EF(Teich): 58 74 %  ESV(Teich): 61 42 ml  IVSd: 1 51 cm  LA Area: 20 63 cm2  LA Diam: 5 65 cm  LVIDd: 5 52 cm  LVIDs: 3 79 cm  LVPWd: 1 08 cm  RA Area: 33 86 cm2  RWT: 0 39  SV(Teich): 87 44 ml    CW  RAP: 0 mmHg  TR Vmax: 3 47 m/s  TR maxP 33 mmHg    PW  E' Sept: 0 09 m/s  E/E' Sept: 13 35  MV A Ti: 0 48 m/s  MV Dec Dixon: 4 5 m/s2  MV DecT: 253 15 ms  MV E Ti: 1 14 m/s  MV E/A Ratio: 2 36  RVSP: 48 33 mmHg    [facility]    Prepared and electronically signed by    Sofía Robles DO  Signed 2021 08:06:00       No results found for this or any previous visit  No results found for this or any previous visit  No results found for this or any previous visit      Meds/Allergies   all current active meds have been reviewed  Medications Prior to Admission   Medication    carvedilol (COREG) 6 25 mg tablet    digoxin (LANOXIN) 0 25 mg tablet    furosemide (LASIX) 40 mg tablet    metFORMIN (GLUCOPHAGE) 1000 MG tablet    metolazone (ZAROXOLYN) 5 mg tablet    omeprazole (PriLOSEC) 40 MG capsule    warfarin (COUMADIN) 5 mg tablet    zolpidem (AMBIEN) 10 mg tablet       furosemide, 10 mg/hr, Last Rate: 10 mg/hr (21 7471)      Assessment:  Principal Problem:    Acute on chronic diastolic CHF (congestive heart failure) (HCC)  Active Problems:    STEFF (obstructive sleep apnea)    Pleural effusion on right    Cirrhosis of liver with ascites (HCC)    CKD (chronic kidney disease) stage 3, GFR 30-59 ml/min    Hypokalemia    Lactic acidosis    Permanent atrial fibrillation (HCC)    Diverticulosis of colon    Lesion of spleen    Type 2 diabetes mellitus with hyperglycemia, without long-term current use of insulin ([facility] )    Hypoxia    Tobacco use    Hyponatremia    Constipation    Pulmonary hypertension (HCC)    Vitamin D deficiency    Atelectasis    Anemia

## 2021-03-05 NOTE — ASSESSMENT & PLAN NOTE
Lab Results   Component Value Date    HGBA1C 6 1 (H) 01/05/2021       Recent Labs     03/03/21  2107 03/04/21  0640 03/04/21  1024 03/04/21  1537   POCGLU 142* 121 176* 119       Blood Sugar Average: Last 72 hrs:  (P) 969 [id number]     · Recently take off metformin as an outpatient secondary to his liver disease  · Not currently on any diabetic medications as an outpatient  · Consider the initiation of an ACE-inhibitor for renal protection in the setting of type 2 diabetes mellitus if his renal function remains stable  · Insulin sliding scale with blood glucose monitoring ACHS

## 2021-03-05 NOTE — PROGRESS NOTES
Progress Note - Lashonda Lopez 1960, 57 y o  male MRN: [id number]    Unit/Bed#: 410-01 Encounter: [id number]    Primary Care Provider: Jersey Bernardo DO   Date and time admitted to hospital: 2/22/2021  9:27 AM        * Acute on chronic diastolic CHF (congestive heart failure) ([address] )  Assessment & Plan  Wt Readings from Last 3 Encounters:   03/05/21 (!) 142 kg (313 lb 7 9 oz)   01/13/21 (!) 159 kg (351 lb 6 4 oz)   01/12/21 (!) 161 kg (356 lb)     · Troponin levels were within normal limits x 3 sets  · Transitioned to lasix gtt @ 20 mg/hr on 02/28/2021, this was decreased to 10 mg/hr on 3/3/21 by cardiology given creatinine elevation- this will be continued today  Patient's weight continues to trend down  331 lbs --> 320 lbs --> 315 lbs --> 313 lbs today  · Metolazone 2 5 mg PO x1 on 2/28  · Continue coreg 6 25 mg PO BID  · Monitor the patient's renal function closely while receiving the lasix gtt treatment  · Daily weights  · Strict intake/output measurements  · PT/OT  · Cardiology and Nephrology recommendation appreciated        Anemia  Assessment & Plan  · Vitamin B12 level, and folate level WNL  · Iron panel: decreased iron and low normal ferritin, initiated ferrous sulfate PO daily  · Check the patient's stool for occult blood x 3 specimens: negative x 1  · Follow the CBC  · Transfuse for a hemoglobin less than 7 g/dl     Results from last 7 days   Lab Units 03/05/21  0434 03/04/21  0613 03/03/21  0512   WBC Thousand/uL 4 98 4 76 5 43   HEMOGLOBIN g/dL 9 6* 9 3* 9 6*   HEMATOCRIT % 32 6* 31 2* 32 0*   PLATELETS Thousands/uL 201 197 188         Atelectasis  Assessment & Plan  · Incentive spirometry 10 times per hour while awake    Vitamin D deficiency  Assessment & Plan  · Initiated on cholecalciferol 2000 I  U  PO Qdaily  · Recheck a vitamin D 25-OH level in 1-2 months with his PCP    Results for Jonathan Echeverria (MRN [id number]) as of 2/24/2021 12:28   Ref   Range 2/23/2021 05:26   Vit D, 25-Hydroxy Latest Ref Range: 30 0 - 100 0 ng/mL 13 1 (L)           Pulmonary hypertension (HCC)  Assessment & Plan  · Outpatient follow-up with Pulmonology    Constipation  Assessment & Plan  · Received miralax 17 grams PO x 1 dose on 02/24/2021  · The constipation has improved  · Continue colace 100 mg PO BID and dulcolax 10 mg PO Qdaily after dinner    Hyponatremia  Assessment & Plan  · Likely hypervolemic hyponatremia  · Resolved with IV lasix treatment  · Increased lasix gtt @ 20 mg/hr on 02/28/2021, decreased lasix gtt to 10 mg/hr on 3/3/21 by cardiology  · Follow the sodium level    Results from last 7 days   Lab Units 03/05/21  0434 03/04/21  0613 03/03/21  0512   SODIUM mmol/L 139 140 139   POTASSIUM mmol/L 3 8 3 4* 3 7   CHLORIDE mmol/L 98* 99* 97*   CO2 mmol/L 35* 35* 35*   BUN mg/dL 25 26* 27*   CREATININE mg/dL 1 65* 1 59* 1 79*   CALCIUM mg/dL 9 4 9 4 9 2         Tobacco use  Assessment & Plan  · Nicotine patch  · Smoking cessation counseling    Hypoxia  Assessment & Plan  · Initially required 4 lpm of continuous supplemental oxygen via the nasal cannula to maintain oxygen saturations at 92% and above  · Successfully weaned off supplemental oxygen at this time  · Secondary to right-sided pleural effusion  · On the day of discharge, we will need to check an ambulatory pulse oximetry test to see if the patient qualifies for home supplemental oxygen    · Respiratory protocol  · Incentive spirometry 10 times per hour while awake    Type 2 diabetes mellitus with hyperglycemia, without long-term current use of insulin [facility])  Assessment & Plan  Lab Results   Component Value Date    HGBA1C 6 1 (H) 01/05/2021       Recent Labs     03/03/21  2107 03/04/21  0640 03/04/21  1024 03/04/21  1537   POCGLU 142* 121 176* 119       Blood Sugar Average: Last 72 hrs:  (P) 516 [id number]     · Recently take off metformin as an outpatient secondary to his liver disease  · Not currently on any diabetic medications as an outpatient  · Consider the initiation of an ACE-inhibitor for renal protection in the setting of type 2 diabetes mellitus if his renal function remains stable  · Insulin sliding scale with blood glucose monitoring ACHS    Lesion of spleen  Assessment & Plan  · CT scan of the abdomen/pelvis (02/22/2021): Subcapsular cystic lesion in the spleen (probably related to old trauma)   Outpatient follow-up with PCP in regards to this matter    Diverticulosis of colon  Assessment & Plan  · Outpatient follow-up with Gastroenterology    Permanent atrial fibrillation [facility])  Assessment & Plan  · Continue PO coreg and PO digoxin  · Patient bradycardic- digoxin decreased to every other day dosing by cardiology on 3/5/21  · Continue anticoagulation with PO coumadin for a goal INR of 2-3  · Outpatient follow-up with Cardiology    Results for Donald Crockett (MRN [id number]) as of 2/24/2021 12:39   Ref  Range 2/23/2021 05:27   DIGOXIN LEVEL Latest Ref Range: 0 8 - 2 0 ng/mL 1 7       Lactic acidosis  Assessment & Plan  · Resolved with thiamine 200 mg IV x 1 dose on 02/22/2021    Hypokalemia  Assessment & Plan  K+ of 3 3  Resolved with potassium chloride   Continue to monitor BMP and Mg    CKD (chronic kidney disease) stage 3, GFR 30-59 ml/min  Assessment & Plan  Lab Results   Component Value Date    EGFR 44 03/05/2021    EGFR 46 03/04/2021    EGFR 40 03/03/2021    CREATININE 1 65 (H) 03/05/2021    CREATININE 1 59 (H) 03/04/2021    CREATININE 1 79 (H) 03/03/2021     · Baseline serum creatinine of 1 3-1 6 mg/dl  · Avoid all nephrotoxic agents  · Serial laboratory testing to monitor the patient's renal function and electrolytes  · Check post-void residuals    Cirrhosis of liver with ascites (HCC)  Assessment & Plan  · The patient was seen in consultation by Gastroenterology    · Avoid all hepatotoxic agents  · Outpatient hepatocellular carcinoma surveillance and follow-up with Gastroenterology    US liver doppler only  Status: Final result   PACS Images     Show images for US liver doppler only   Study Result    ABDOMEN ULTRASOUND, COMPLETE WITH DOPPLER     INDICATION:    Cirrhosis with ascites        COMPARISON:  Compared with 10/2/2020     TECHNIQUE:   Real-time ultrasound of the abdomen was performed with a curvilinear transducer with Doppler evaluation of liver      FINDINGS:     The main portal vein and primary branch segments are patent and hepatopetal with normal spectral waveform  Peak systolic velocity at 32 cm/s  The right at 42 cm/s and the left at 59 cm/s      Hepatic veins are patent  Spectral waveforms within normal limits  Right hepatic vein at 57 cm/s, mid hepatic vein at 56 cm/s and left hepatic vein at 77 cm/s        Main hepatic artery appears normal size, patent with normal spectral waveform      Splenic vein is patent  56 cm/s      IMPRESSION:     Patent portal vein and hepatic veins  Pleural effusion on right  Assessment & Plan  · Recurrent right-sided pleural effusion  · Successful right-sided thoracentesis by Intervention Radiology on 02/23/2021, which yielded 1600 ml of pleural fluid  · Increased to lasix gtt @ 20 mg/hr on 02/28/2021, this was decreased to 10 mg/hr on 3/3/21 by cardiology  · Appears to be a transudative pleural effusion by fluid analysis  · The patient was seen in consultation by Pulmonology  STEFF (obstructive sleep apnea)  Assessment & Plan  · Continue BIPAP at 17/11 QHS and anytime while sleeping      VTE Pharmacologic Prophylaxis:   Pharmacologic: Warfarin (Coumadin)  Mechanical VTE Prophylaxis in Place: Yes    Patient Centered Rounds: I have performed bedside rounds with nursing staff today  Discussions with Specialists or Other Care Team Provider: nursing, CM    Education and Discussions with Family / Patient: patient    Time Spent for Care: 20 minutes  More than 50% of total time spent on counseling and coordination of care as described above      Current Length of Stay: 11 day(s)    Current Patient Status: Inpatient   Certification Statement: The patient will continue to require additional inpatient hospital stay due to continued need for lasix infusion and monitoring of labs/weights  Discharge Plan: TBD    Code Status: Level 1 - Full Code      Subjective: The patient was seen and examined  The patient denies any complaints  Objective:     Vitals:   Temp (24hrs), Av 1 °F (36 7 °C), Min:97 9 °F (36 6 °C), Max:98 3 °F (36 8 °C)    Temp:  [97 9 °F (36 6 °C)-98 3 °F (36 8 °C)] 98 3 °F (36 8 °C)  HR:  [59-66] 66  Resp:  [17-20] 18  BP: (112-141)/(43-68) 133/67  SpO2:  [94 %-97 %] 96 %  Body mass index is 43 72 kg/m²  Input and Output Summary (last 24 hours): Intake/Output Summary (Last 24 hours) at 3/5/2021 1253  Last data filed at 3/5/2021 1114  Gross per 24 hour   Intake 750 ml   Output 5300 ml   Net -4550 ml       Physical Exam:     Physical Exam  Vitals signs and nursing note reviewed  Constitutional:       General: He is awake  Appearance: He is morbidly obese  Cardiovascular:      Rate and Rhythm: Regular rhythm  Bradycardia present  Pulmonary:      Effort: Pulmonary effort is normal       Breath sounds: Normal breath sounds  No wheezing, rhonchi or rales  Abdominal:      General: Bowel sounds are normal       Palpations: Abdomen is soft  Tenderness: There is no abdominal tenderness  Musculoskeletal:      Right lower leg: Edema present  Left lower leg: Edema present  Skin:     General: Skin is warm and dry  Neurological:      General: No focal deficit present  Mental Status: He is alert and oriented to person, place, and time  Psychiatric:         Attention and Perception: Attention normal          Mood and Affect: Mood normal          Speech: Speech normal          Behavior: Behavior is cooperative           Additional Data:     Labs:    Results from last 7 days   Lab Units 21  0434   WBC Thousand/uL 4 98   HEMOGLOBIN g/dL 9 6*   HEMATOCRIT % 32 6*   PLATELETS Thousands/uL 201   NEUTROS PCT % 72   LYMPHS PCT % 14   MONOS PCT % 7   EOS PCT % 6     Results from last 7 days   Lab Units 03/05/21  0434  03/03/21  0512   SODIUM mmol/L 139   < > 139   POTASSIUM mmol/L 3 8   < > 3 7   CHLORIDE mmol/L 98*   < > 97*   CO2 mmol/L 35*   < > 35*   BUN mg/dL 25   < > 27*   CREATININE mg/dL 1 65*   < > 1 79*   ANION GAP mmol/L 6   < > 7   CALCIUM mg/dL 9 4   < > 9 2   ALBUMIN g/dL  --   --  2 9*   TOTAL BILIRUBIN mg/dL  --   --  0 80   ALK PHOS U/L  --   --  136*   ALT U/L  --   --  21   AST U/L  --   --  21   GLUCOSE RANDOM mg/dL 131   < > 133    < > = values in this interval not displayed  Results from last 7 days   Lab Units 03/05/21  0434   INR  2 38*     Results from last 7 days   Lab Units 03/04/21  1537 03/04/21  1024 03/04/21  0640 03/03/21  2107 03/03/21  1609 03/03/21  1105 03/03/21  0701 03/02/21  2102 03/02/21  1601 03/02/21  1045 03/02/21  0713 03/01/21  2043   POC GLUCOSE mg/dl 119 176* 121 142* 130 105 110 131 121 113 125 139         Results from last 7 days   Lab Units 03/01/21  0626 02/28/21  0454   PROCALCITONIN ng/ml 0 21 0 21           * I Have Reviewed All Lab Data Listed Above  * Additional Pertinent Lab Tests Reviewed:  All Labs Within Last 24 Hours Reviewed    Imaging:    Imaging Reports Reviewed Today Include: none  Imaging Personally Reviewed by Myself Includes:  none    Recent Cultures (last 7 days):           Last 24 Hours Medication List:   Current Facility-Administered Medications   Medication Dose Route Frequency Provider Last Rate    bisacodyl  10 mg Oral After Jose Ritchie DO      carvedilol  6 25 mg Oral BID With Meals [employer], DO      cholecalciferol  2,000 Units Oral Daily [employer], DO      [START ON 3/7/2021] digoxin  250 mcg Oral Every Other Day Neelam Mendiola PA-C      docusate sodium  100 mg Oral BID [employer], DO      ferrous sulfate  325 mg Oral Daily With Breakfast Isatu Johnson MD      furosemide  10 mg/hr Intravenous Continuous Tia JAVAD Mendiola 10 mg/hr (03/04/21 1841)    insulin lispro  1-6 Units Subcutaneous HS Elyse Whitt, DO      insulin lispro  2-12 Units Subcutaneous TID AC Elyse Whitt, DO      nicotine  1 patch Transdermal Daily Elyse Whitt, DO      oxyCODONE  5 mg Oral Q4H PRN Elyse Whitt, DO      pantoprazole  40 mg Oral Early Morning Elyse Whitt, DO      polyvinyl alcohol  2 drop Both Eyes Q3H PRN Elyse Whitt, DO      potassium chloride  20 mEq Oral TID With Meals TRES Hogan      warfarin  5 mg Oral Daily (warfarin) Elyse Whitt DO          Today, Patient Was Seen By: Ricardo Reyna PA-C    ** Please Note: Dictation voice to text software may have been used in the creation of this document   **

## 2021-03-05 NOTE — CASE MANAGEMENT
As per physician during inner disciplinary discharge planning meeting patient will be here through the weekend  Pt remains on IV Lasix BID  They are going to start wrapping BLE with Ace wraps  Pt would benefit from Cardiac rehab on discharge

## 2021-03-05 NOTE — ASSESSMENT & PLAN NOTE
Lab Results   Component Value Date    EGFR 44 03/05/2021    EGFR 46 03/04/2021    EGFR 40 03/03/2021    CREATININE 1 65 (H) 03/05/2021    CREATININE 1 59 (H) 03/04/2021    CREATININE 1 79 (H) 03/03/2021     · Baseline serum creatinine of 1 3-1 6 mg/dl  · Avoid all nephrotoxic agents  · Serial laboratory testing to monitor the patient's renal function and electrolytes  · Check post-void residuals

## 2021-03-05 NOTE — ASSESSMENT & PLAN NOTE
Wt Readings from Last 3 Encounters:   03/05/21 (!) 142 kg (313 lb 7 9 oz)   01/13/21 (!) 159 kg (351 lb 6 4 oz)   01/12/21 (!) 161 kg (356 lb)     · Troponin levels were within normal limits x 3 sets  · Transitioned to lasix gtt @ 20 mg/hr on 02/28/2021, this was decreased to 10 mg/hr on 3/3/21 by cardiology given creatinine elevation- this will be continued today  Patient's weight continues to trend down  331 lbs --> 320 lbs --> 315 lbs --> 313 lbs today     · Metolazone 2 5 mg PO x1 on 2/28  · Continue coreg 6 25 mg PO BID  · Monitor the patient's renal function closely while receiving the lasix gtt treatment  · Daily weights  · Strict intake/output measurements  · PT/OT  · Cardiology and Nephrology recommendation appreciated

## 2021-03-05 NOTE — ASSESSMENT & PLAN NOTE
· Initiated on cholecalciferol 2000 I  U  PO Qdaily  · Recheck a vitamin D 25-OH level in 1-2 months with his PCP    Results for Leslie Peoples (MRN [id number]) as of 2/24/2021 12:28   Ref   Range 2/23/2021 05:26   Vit D, 25-Hydroxy Latest Ref Range: 30 0 - 100 0 ng/mL 13 1 (L)

## 2021-03-05 NOTE — PLAN OF CARE
Problem: Potential for Falls  Goal: Patient will remain free of falls  Description: INTERVENTIONS:  - Assess patient frequently for physical needs  -  Identify cognitive and physical deficits and behaviors that affect risk of falls    -  [place] fall precautions as indicated by assessment   - Educate patient/family on patient safety including physical limitations  - Instruct patient to call for assistance with activity based on assessment  - Modify environment to reduce risk of injury  - Consider OT/PT consult to assist with strengthening/mobility  Outcome: Progressing     Problem: CARDIOVASCULAR - ADULT  Goal: Maintains optimal cardiac output and hemodynamic stability  Description: INTERVENTIONS:  - Monitor I/O, vital signs and rhythm  - Monitor for S/S and trends of decreased cardiac output  - Administer and titrate ordered vasoactive medications to optimize hemodynamic stability  - Assess quality of pulses, skin color and temperature  - Assess for signs of decreased coronary artery perfusion  - Instruct patient to report change in severity of symptoms  Outcome: Progressing  Goal: Absence of cardiac dysrhythmias or at baseline rhythm  Description: INTERVENTIONS:  - Continuous cardiac monitoring, vital signs, obtain 12 lead EKG if ordered  - Administer antiarrhythmic and heart rate control medications as ordered  - Monitor electrolytes and administer replacement therapy as ordered  Outcome: Progressing     Problem: RESPIRATORY - ADULT  Goal: Achieves optimal ventilation and oxygenation  Description: INTERVENTIONS:  - Assess for changes in respiratory status  - Assess for changes in mentation and behavior  - Position to facilitate oxygenation and minimize respiratory effort  - Oxygen administered by appropriate delivery if ordered  - Initiate smoking cessation education as indicated  - Encourage broncho-pulmonary hygiene including cough, deep breathe, Incentive Spirometry  - Assess the need for suctioning and aspirate as needed  - Assess and instruct to report SOB or any respiratory difficulty  - Respiratory Therapy support as indicated  Outcome: Progressing     Problem: METABOLIC, FLUID AND ELECTROLYTES - ADULT  Goal: Electrolytes maintained within normal limits  Description: INTERVENTIONS:  - Monitor labs and assess patient for signs and symptoms of electrolyte imbalances  - Administer electrolyte replacement as ordered  - Monitor response to electrolyte replacements, including repeat lab results as appropriate  - Instruct patient on fluid and nutrition as appropriate  Outcome: Progressing  Goal: Fluid balance maintained  Description: INTERVENTIONS:  - Monitor labs   - Monitor I/O and WT  - Instruct patient on fluid and nutrition as appropriate  - Assess for signs & symptoms of volume excess or deficit  Outcome: Progressing  Goal: Glucose maintained within target range  Description: INTERVENTIONS:  - Monitor Blood Glucose as ordered  - Assess for signs and symptoms of hyperglycemia and hypoglycemia  - Administer ordered medications to maintain glucose within target range  - Assess nutritional intake and initiate nutrition service referral as needed  Outcome: Progressing     Problem: SAFETY ADULT  Goal: Patient will remain free of falls  Description: INTERVENTIONS:  - Assess patient frequently for physical needs  -  Identify cognitive and physical deficits and behaviors that affect risk of falls    -  [place] fall precautions as indicated by assessment   - Educate patient/family on patient safety including physical limitations  - Instruct patient to call for assistance with activity based on assessment  - Modify environment to reduce risk of injury  - Consider OT/PT consult to assist with strengthening/mobility  Outcome: Progressing  Goal: Maintain or return to baseline ADL function  Description: INTERVENTIONS:  -  Assess patient's ability to carry out ADLs; assess patient's baseline for ADL function and identify physical deficits which impact ability to perform ADLs (bathing, care of mouth/teeth, toileting, grooming, dressing, etc )  - Assess/evaluate cause of self-care deficits   - Assess range of motion  - Assess patient's mobility; develop plan if impaired  - Assess patient's need for assistive devices and provide as appropriate  - Encourage maximum independence but intervene and supervise when necessary  - Involve family in performance of ADLs  - Assess for home care needs following discharge   - Consider OT consult to assist with ADL evaluation and planning for discharge  - Provide patient education as appropriate  Outcome: Progressing  Goal: Maintain or return mobility status to optimal level  Description: INTERVENTIONS:  - Assess patient's baseline mobility status (ambulation, transfers, stairs, etc )    - Identify cognitive and physical deficits and behaviors that affect mobility  - Identify mobility aids required to assist with transfers and/or ambulation (gait belt, sit-to-stand, lift, walker, cane, etc )  - [place] fall precautions as indicated by assessment  - Record patient progress and toleration of activity level on Mobility SBAR; progress patient to next Phase/Stage  - Instruct patient to call for assistance with activity based on assessment  - Consider rehabilitation consult to assist with strengthening/weightbearing, etc   Outcome: Progressing     Problem: DISCHARGE PLANNING  Goal: Discharge to home or other facility with appropriate resources  Description: INTERVENTIONS:  - Identify barriers to discharge w/patient and caregiver  - Arrange for needed discharge resources and transportation as appropriate  - Identify discharge learning needs (meds, wound care, etc )  - Refer to Case Management Department for coordinating discharge planning if the patient needs post-hospital services based on physician/advanced practitioner order or complex needs related to functional status, cognitive ability, or social support system  Outcome: Progressing     Problem: Knowledge Deficit  Goal: Patient/family/caregiver demonstrates understanding of disease process, treatment plan, medications, and discharge instructions  Description: Complete learning assessment and assess knowledge base  Interventions:  - Provide teaching at level of understanding  - Provide teaching via preferred learning methods  Outcome: Progressing     Problem: Nutrition/Hydration-ADULT  Goal: Nutrient/Hydration intake appropriate for improving, restoring or maintaining nutritional needs  Description: Monitor and assess patient's nutrition/hydration status for malnutrition  Collaborate with interdisciplinary team and initiate plan and interventions as ordered  Monitor patient's weight and dietary intake as ordered or per policy  Utilize nutrition screening tool and intervene as necessary  Determine patient's food preferences and provide high-protein, high-caloric foods as appropriate       INTERVENTIONS:  - Monitor oral intake, urinary output, labs, and treatment plans  - Assess nutrition and hydration status and recommend course of action  - Evaluate amount of meals eaten  - Assist patient with eating if necessary   - Allow adequate time for meals  - Recommend/ encourage appropriate diets, oral nutritional supplements, and vitamin/mineral supplements  - Order, calculate, and assess calorie counts as needed  - Recommend, monitor, and adjust tube feedings and TPN/PPN based on assessed needs  - Assess need for intravenous fluids  - Provide specific nutrition/hydration education as appropriate  - Include patient/family/caregiver in decisions related to nutrition  Outcome: Progressing

## 2021-03-05 NOTE — ASSESSMENT & PLAN NOTE
· Continue PO coreg and PO digoxin  · Patient bradycardic- digoxin decreased to every other day dosing by cardiology on 3/5/21  · Continue anticoagulation with PO coumadin for a goal INR of 2-3  · Outpatient follow-up with Cardiology    Results for Sourav Newell (MRN [id number]) as of 2/24/2021 12:39   Ref   Range 2/23/2021 05:27   DIGOXIN LEVEL Latest Ref Range: 0 8 - 2 0 ng/mL 1 7

## 2021-03-06 PROBLEM — J96.01 ACUTE RESPIRATORY FAILURE WITH HYPOXIA (HCC): Status: ACTIVE | Noted: 2021-02-22

## 2021-03-06 NOTE — PROGRESS NOTES
[facility] PROGRESS NOTE   Belvie Night 57 y o  male MRN: [id number]  Unit/Bed#: 410-01 Encounter: [id number]  Reason for Consult:  Acute kidney injury on CKD    ASSESSMENT and PLAN:  54-year-old male with a past medical history of CKD, chronic diastolic CHF, cirrhosis who presented on 02/22/2021 for dyspnea and progressive weight gain  Nephrology following for management of acute kidney injury on CKD  1  Acute kidney injury on CKD:  · Creatinine 1 3 on admission peaking at 1 79 on 03/03 then declining  · Creatinine currently 1 61  Creatinine appears to be plateauing near 1 6 the last several days  · 4 L urine output on Lasix infusion  · CT of the chest abdomen and pelvis with contrast on 02/22:  No hydronephrosis  · Etiology:  Contrast exposure 2/22  Aggressive diuresis  May need to accept higher creatinine for euvolemia  2  Chronic kidney disease stage 3:  Baseline creatinine near 1 3 mg/dL  3  Hypertension:  4  Acute on chronic diastolic CHF:    · Diuresed with Lasix infusion per Cardiology  · Dose tapered to 10 milligrams/hour on 03/03  · Weight continues to decline  Weight down 40 lb since admission  · On fluid restriction, salt restricted diet  · Cardiology continuing Lasix infusion as long as patient continues to diurese and renal function remains stable  · Last chest x-ray 2/23 shows mild cardiomegaly, decrease in right pleural effusion  · Per cardiology's note at discharge recommend Lasix 40 mg twice a day and metolazone twice a week  5  Metabolic alkalosis:  Bicarbonate level 32, improved  Try to keep potassium level near 4 0  6  Atrial fibrillation/ Bradycardia:  Digoxin dose adjusted to every other day  7  Hypokalemia:  Potassium 3 6  Continue to replete  8  Anemia:  Hemoglobin 9 4  On oral iron  Management per primary team  9  Cirrhosis:  Appears to be quiescent at this time    DISPOSITION:  Lasix infusion per Cardiology  Continue K Dur 20 mEq b i d      Glenis Gong / Mode Plata INTERVAL HISTORY:  Feeling much better  Hoping to go home early next week  No acute complaints  Able to take a shower today  Continues to have significant lower extremity edema    OBJECTIVE:  Current Weight: Weight - Scale: (!) 141 kg (310 lb 10 1 oz)  Vitals:    03/05/21 1418 03/05/21 2312 03/06/21 0703 03/06/21 0705   BP: 129/68 109/55  117/57   BP Location: Left arm      Pulse: (!) 50 (!) 53  64   Resp: 16 17  18   Temp: 98 °F (36 7 °C) 98 2 °F (36 8 °C)  97 6 °F (36 4 °C)   TempSrc: Oral Oral     SpO2: 96% 95%  98%   Weight:   (!) 141 kg (310 lb 10 1 oz)    Height:           Intake/Output Summary (Last 24 hours) at 3/6/2021 1253  Last data filed at 3/6/2021 1030  Gross per 24 hour   Intake 720 ml   Output 3000 ml   Net -2280 ml     General: NAD  Skin: no rash  Eyes: anicteric sclera  ENT: moist mucous membrane  Neck: supple  Chest: CTA b/l, no ronchii, no wheeze, no rubs, no rales  Normal effort  CVS: s1s2, no murmur, no gallop, no rub    Normal rate  Abdomen: soft, nontender, nl sounds  Extremities:  Severe edema LE b/l  : no leahy  Neuro: AAOX3  Psych: normal affect  Medications:    Current Facility-Administered Medications:     bisacodyl (DULCOLAX) EC tablet 10 mg, 10 mg, Oral, After Elio Carrludmila, DO, 10 mg at 03/05/21 1720    carvedilol (COREG) tablet 6 25 mg, 6 25 mg, Oral, BID With Meals, Lino Romero DO, 6 25 mg at 03/06/21 9368    cholecalciferol (VITAMIN D3) tablet 2,000 Units, 2,000 Units, Oral, Daily, Lino Romero DO, 2,000 Units at 03/06/21 0851    [START ON 3/7/2021] digoxin (LANOXIN) tablet 250 mcg, 250 mcg, Oral, Every Other Day, Neelam Mendiola PA-C    docusate sodium (COLACE) capsule 100 mg, 100 mg, Oral, BID, Lino Romero DO, 100 mg at 03/06/21 2064    ferrous sulfate tablet 325 mg, 325 mg, Oral, Daily With Breakfast, Isatu Mittal MD, 325 mg at 03/06/21 0852    furosemide (LASIX) 500 mg infusion 50 mL, 10 mg/hr, Intravenous, Continuous, Tia JAVAD Mendiola, Last Rate: 1 mL/hr at 03/05/21 2356, 10 mg/hr at 03/05/21 2356    insulin lispro (HumaLOG) 100 units/mL subcutaneous injection 1-6 Units, 1-6 Units, Subcutaneous, HS, Laurel Manner, DO, 1 Units at 03/05/21 2129    insulin lispro (HumaLOG) 100 units/mL subcutaneous injection 2-12 Units, 2-12 Units, Subcutaneous, TID AC, 2 Units at 03/04/21 1149 **AND** Fingerstick Glucose (POCT), , , TID AC, Laurel Manner, DO    nicotine (NICODERM CQ) 7 mg/24hr TD 24 hr patch 1 patch, 1 patch, Transdermal, Daily, Laurel Manner, DO    oxyCODONE (ROXICODONE) IR tablet 5 mg, 5 mg, Oral, Q4H PRN, Laurel Anderson, DO, 5 mg at 03/05/21 0827    pantoprazole (PROTONIX) EC tablet 40 mg, 40 mg, Oral, Early Morning, Rocco Lawrence, DO, 40 mg at 03/06/21 0504    polyvinyl alcohol (LIQUIFILM TEARS) 1 4 % ophthalmic solution 2 drop, 2 drop, Both Eyes, Q3H PRN, Laurel Anderson, DO, 2 drop at 02/26/21 1709    potassium chloride (K-DUR,KLOR-CON) CR tablet 20 mEq, 20 mEq, Oral, TID With Meals, ERNA CrowNP, 20 mEq at 03/06/21 1137    warfarin (COUMADIN) tablet 5 mg, 5 mg, Oral, Daily (warfarin), Laurel Anderson, DO, 5 mg at 03/05/21 1720    Laboratory Results:  Results from last 7 days   Lab Units 03/06/21  0511 03/05/21  0434 03/04/21  5950 03/03/21  0512 03/02/21  0436 03/01/21  0626 02/28/21  0454   WBC Thousand/uL 4 92 4 98 4 76 5 43 5 94 5 81 5 75   HEMOGLOBIN g/dL 9 4* 9 6* 9 3* 9 6* 9 1* 9 2* 8 4*   HEMATOCRIT % 32 2* 32 6* 31 2* 32 0* 30 4* 32 1* 28 4*   PLATELETS Thousands/uL 178 201 197 188 185 187 175   POTASSIUM mmol/L 3 6 3 8 3 4* 3 7 3 3* 3 4* 3 4*   CHLORIDE mmol/L 100 98* 99* 97* 99* 101 101   CO2 mmol/L 32 35* 35* 35* 37* 34* 31   BUN mg/dL 27* 25 26* 27* 26* 27* 27*   CREATININE mg/dL 1 61* 1 65* 1 59* 1 79* 1 66* 1 59* 1 66*   CALCIUM mg/dL 9 3 9 4 9 4 9 2 9 4 9 2 8 6   MAGNESIUM mg/dL 2 3  --   --  2 1 2 0 2 3 2 1   PHOSPHORUS mg/dL  --   --   --   --  3 8 3 8 3 7

## 2021-03-06 NOTE — ASSESSMENT & PLAN NOTE
· Vitamin B12 level, and folate level WNL  · Iron panel: decreased iron and low normal ferritin, initiated ferrous sulfate PO daily  · Check the patient's stool for occult blood x 3 specimens: negative x 1  · Follow the CBC  · Transfuse for a hemoglobin less than 7 g/dl     Results from last 7 days   Lab Units 03/06/21  0511 03/05/21  0434 03/04/21  0613   WBC Thousand/uL 4 92 4 98 4 76   HEMOGLOBIN g/dL 9 4* 9 6* 9 3*   HEMATOCRIT % 32 2* 32 6* 31 2*   PLATELETS Thousands/uL 178 201 197

## 2021-03-06 NOTE — PROGRESS NOTES
Progress Note - Owen Sy 1960, 57 y o  male MRN: [id number]    Unit/Bed#: 410-01 Encounter: [id number]    Primary Care Provider: Yomi Espinoza DO   Date and time admitted to hospital: 2/22/2021  9:27 AM      * Acute on chronic diastolic CHF (congestive heart failure) ([facility] [address] )  Assessment & Plan  Wt Readings from Last 3 Encounters:   03/06/21 (!) 141 kg (310 lb 10 1 oz)   01/13/21 (!) 159 kg (351 lb 6 4 oz)   01/12/21 (!) 161 kg (356 lb)     · Troponin levels were within normal limits x 3 sets  · Transitioned to lasix gtt @ 20 mg/hr on 02/28/2021, this was decreased to 10 mg/hr on 3/3/21 by cardiology given creatinine elevation- this will be continued today  Patient's weight continues to trend down  331 lbs --> 320 lbs --> 315 lbs --> 313 lbs --> 310lbs today  · Metolazone 2 5 mg PO x1 on 2/28  · Continue Coreg 6 25 mg PO BID  · Monitor the patient's renal function closely while receiving the lasix gtt treatment  · Creatinine remains at baseline  Will continue with diuresis at same rate  · Daily weights  · Strict intake/output measurements  · PT/OT  · Cardiology and Nephrology recommendations appreciated        Acute respiratory failure with hypoxia (HCC)  Assessment & Plan  · Initially required 4lpm of continuous supplemental oxygen via the nasal cannula to maintain oxygen saturations at 92% and above  · Successfully weaned off supplemental oxygen at this time  · Secondary to right-sided pleural effusion  · On the day of discharge, we will need to check an ambulatory pulse oximetry test to see if the patient qualifies for home supplemental oxygen  · Respiratory protocol  · Incentive spirometry 10 times per hour while awake  Cirrhosis of liver with ascites [facility])  Assessment & Plan  · The patient was seen in consultation by Gastroenterology    · Avoid all hepatotoxic agents  · Outpatient hepatocellular carcinoma surveillance and follow-up with Gastroenterology    US liver doppler only  Status: Final result   PACS Images     Show images for US liver doppler only   Study Result    ABDOMEN ULTRASOUND, COMPLETE WITH DOPPLER     INDICATION:    Cirrhosis with ascites        COMPARISON:  Compared with 10/2/2020     TECHNIQUE:   Real-time ultrasound of the abdomen was performed with a curvilinear transducer with Doppler evaluation of liver      FINDINGS:     The main portal vein and primary branch segments are patent and hepatopetal with normal spectral waveform  Peak systolic velocity at 32 cm/s  The right at 42 cm/s and the left at 59 cm/s      Hepatic veins are patent  Spectral waveforms within normal limits  Right hepatic vein at 57 cm/s, mid hepatic vein at 56 cm/s and left hepatic vein at 77 cm/s        Main hepatic artery appears normal size, patent with normal spectral waveform      Splenic vein is patent  56 cm/s      IMPRESSION:     Patent portal vein and hepatic veins  Pleural effusion on right  Assessment & Plan  · Recurrent right-sided pleural effusion  · Successful right-sided thoracentesis by [facility] on 02/23/2021, which yielded 1600ml of pleural fluid  · Increased to lasix gtt @ 20 mg/hr on 02/28/2021, this was decreased to 10mg/hr on 3/3/21 by cardiology  · Appears to be a transudative pleural effusion by fluid analysis  · The patient was seen in consultation by Pulmonology      Anemia  Assessment & Plan  · Vitamin B12 level, and folate level WNL  · Iron panel: decreased iron and low normal ferritin, initiated ferrous sulfate PO daily  · Check the patient's stool for occult blood x 3 specimens: negative x 1  · Follow the CBC  · Transfuse for a hemoglobin less than 7 g/dl     Results from last 7 days   Lab Units 03/06/21  0511 03/05/21  0434 03/04/21  0613   WBC Thousand/uL 4 92 4 98 4 76   HEMOGLOBIN g/dL 9 4* 9 6* 9 3*   HEMATOCRIT % 32 2* 32 6* 31 2*   PLATELETS Thousands/uL 178 201 197         Atelectasis  Assessment & Plan  · Incentive spirometry 10 times per hour while awake    Vitamin D deficiency  Assessment & Plan  · Initiated on cholecalciferol 2000 I  U  PO Qdaily  · Recheck a vitamin D 25-OH level in 1-2 months with his PCP    Results for Meron Reyna (MRN [id number]) as of 2/24/2021 12:28   Ref  Range 2/23/2021 05:26   Vit D, 25-Hydroxy Latest Ref Range: 30 0 - 100 0 ng/mL 13 1 (L)           Pulmonary hypertension (HCC)  Assessment & Plan  · Outpatient follow-up with Pulmonology    Constipation  Assessment & Plan  · Received miralax 17 grams PO x 1 dose on 02/24/2021  · The constipation has improved  · Continue colace 100 mg PO BID and dulcolax 10 mg PO Qdaily after dinner    Hyponatremia  Assessment & Plan  · Likely hypervolemic hyponatremia  · Resolved with IV lasix treatment  · Increased lasix gtt @ 20 mg/hr on 02/28/2021, decreased lasix gtt to 10 mg/hr on 3/3/21 by cardiology  · Follow the sodium level      Results from last 7 days   Lab Units 03/06/21  0511 03/05/21  0434 03/04/21  0613   SODIUM mmol/L 137 139 140   POTASSIUM mmol/L 3 6 3 8 3 4*   CHLORIDE mmol/L 100 98* 99*   CO2 mmol/L 32 35* 35*   BUN mg/dL 27* 25 26*   CREATININE mg/dL 1 61* 1 65* 1 59*   CALCIUM mg/dL 9 3 9 4 9 4         Tobacco use  Assessment & Plan  · Nicotine patch  · Smoking cessation counseling    Type 2 diabetes mellitus with hyperglycemia, without long-term current use of insulin [facility])  Assessment & Plan  Lab Results   Component Value Date    HGBA1C 6 1 (H) 01/05/2021       Recent Labs     03/05/21  1546 03/05/21  2106 03/06/21  0705 03/06/21  1101   POCGLU 103 161* 141* 138       Blood Sugar Average: Last 72 hrs:  (P) 979 [id number]     · Recently take off metformin as an outpatient secondary to his liver disease  · Not currently on any diabetic medications as an outpatient  · Consider the initiation of an ACE-inhibitor for renal protection in the setting of type 2 diabetes mellitus if his renal function remains stable  · Insulin sliding scale with blood glucose monitoring ACHS    Lesion of spleen  Assessment & Plan  · CT scan of the abdomen/pelvis (02/22/2021): Subcapsular cystic lesion in the spleen (probably related to old trauma)   Outpatient follow-up with PCP in regards to this matter    Diverticulosis of colon  Assessment & Plan  · Outpatient follow-up with Gastroenterology    Permanent atrial fibrillation ([facility] [address] )  Assessment & Plan  · Continue PO coreg and PO digoxin  · Patient bradycardic- digoxin decreased to every other day dosing by cardiology on 3/5/21  · Continue anticoagulation with PO coumadin for a goal INR of 2-3  · Outpatient follow-up with Cardiology  Results for Dominguez Dey (MRN [id number]) as of 2/24/2021 12:39   Ref  Range 2/23/2021 05:27   DIGOXIN LEVEL Latest Ref Range: 0 8 - 2 0 ng/mL 1 7       Lactic acidosis  Assessment & Plan  · Resolved with thiamine 200 mg IV x 1 dose on 02/22/2021    Hypokalemia  Assessment & Plan  K+ of 3 3  Resolved with potassium chloride supplementation  Continue to monitor BMP and Mg    CKD (chronic kidney disease) stage 3, GFR 30-59 ml/min  Assessment & Plan  Lab Results   Component Value Date    EGFR 45 03/06/2021    EGFR 44 03/05/2021    EGFR 46 03/04/2021    CREATININE 1 61 (H) 03/06/2021    CREATININE 1 65 (H) 03/05/2021    CREATININE 1 59 (H) 03/04/2021     · Baseline serum creatinine of 1 3-1 6 mg/dl  · Avoid all nephrotoxic agents  · Serial laboratory testing to monitor the patient's renal function and electrolytes  · Check post-void residuals  STEFF (obstructive sleep apnea)  Assessment & Plan  · Continue BIPAP at 17/11 QHS and anytime while sleeping      VTE Pharmacologic Prophylaxis:   Pharmacologic: Warfarin (Coumadin)  Mechanical VTE Prophylaxis in Place: Yes        Time Spent for Care: 30 minutes  More than 50% of total time spent on counseling and coordination of care as described above      Current Length of Stay: 12 day(s)    Current Patient Status: Inpatient   Certification Statement: The patient will continue to require additional inpatient hospital stay due to need for IV lasix infusion  Discharge Plan / Estimated Discharge Date: TBD      Code Status: Level 1 - Full Code      Subjective:   Patient is feeling better today, continues to improve  Notes "100% improvement" in symptoms  Objective:   Vitals:   Temp (24hrs), Av 9 °F (36 6 °C), Min:97 6 °F (36 4 °C), Max:98 2 °F (36 8 °C)    Temp:  [97 6 °F (36 4 °C)-98 2 °F (36 8 °C)] 97 6 °F (36 4 °C)  HR:  [53-64] 64  Resp:  [17-18] 18  BP: (109-117)/(55-57) 117/57  SpO2:  [95 %-98 %] 98 %  Body mass index is 43 32 kg/m²  Input and Output Summary (last 24 hours): Intake/Output Summary (Last 24 hours) at 3/6/2021 1423  Last data filed at 3/6/2021 1321  Gross per 24 hour   Intake 480 ml   Output 3000 ml   Net -2520 ml       Physical Exam:   Physical Exam  Vitals signs and nursing note reviewed  Constitutional:       General: He is not in acute distress  Appearance: He is obese  He is not ill-appearing  HENT:      Head: Normocephalic  Cardiovascular:      Rate and Rhythm: Normal rate and regular rhythm  Heart sounds: No murmur  Pulmonary:      Effort: Pulmonary effort is normal       Breath sounds: Normal breath sounds  Abdominal:      Palpations: Abdomen is soft  Tenderness: There is no abdominal tenderness  Musculoskeletal:      Right lower leg: No edema (pitting and non-pitting edema present  )  Left lower leg: No edema  Skin:     General: Skin is warm  Neurological:      Mental Status: He is alert and oriented to person, place, and time     Psychiatric:         Mood and Affect: Mood normal          Additional Data:   Labs:  Results from last 7 days   Lab Units 21  0511   WBC Thousand/uL 4 92   HEMOGLOBIN g/dL 9 4*   HEMATOCRIT % 32 2*   PLATELETS Thousands/uL 178   NEUTROS PCT % 72   LYMPHS PCT % 14   MONOS PCT % 8   EOS PCT % 5     Results from last 7 days   Lab Units 03/06/21  0511  03/03/21  0512   POTASSIUM mmol/L 3 6   < > 3 7   CHLORIDE mmol/L 100   < > 97*   CO2 mmol/L 32   < > 35*   BUN mg/dL 27*   < > 27*   CREATININE mg/dL 1 61*   < > 1 79*   CALCIUM mg/dL 9 3   < > 9 2   ALK PHOS U/L  --   --  136*   ALT U/L  --   --  21   AST U/L  --   --  21    < > = values in this interval not displayed  Results from last 7 days   Lab Units 03/06/21  0511   INR  2 41*       * I Have Reviewed All Lab Data Listed Above  * Additional Pertinent Lab Tests Reviewed:  All Labs Within Last 24 Hours Reviewed      Imaging:  Imaging Reports Reviewed Today Include: no new imaging to review        Recent Cultures (last 7 days):         Last 24 Hours Medication List:   Current Facility-Administered Medications   Medication Dose Route Frequency Provider Last Rate    bisacodyl  10 mg Oral After Jose Ritchie,       carvedilol  6 25 mg Oral BID With Meals [employer], DO      cholecalciferol  2,000 Units Oral Daily [employer], DO      [START ON 3/7/2021] digoxin  250 mcg Oral Every Other Day Neelam Mendiola PA-C      docusate sodium  100 mg Oral BID [employer], DO      ferrous sulfate  325 mg Oral Daily With Breakfast Isatu Isaac MD      furosemide  10 mg/hr Intravenous Continuous Neelam Mendiola PA-C 10 mg/hr (03/05/21 3366)    insulin lispro  1-6 Units Subcutaneous HS [employer], DO      insulin lispro  2-12 Units Subcutaneous TID AC [employer], DO      nicotine  1 patch Transdermal Daily [employer], DO      oxyCODONE  5 mg Oral Q4H PRN [employer], DO      pantoprazole  40 mg Oral Early Morning [employer], DO      polyvinyl alcohol  2 drop Both Eyes Q3H PRN [employer], DO      potassium chloride  20 mEq Oral TID With Meals TRES Gaitan      warfarin  5 mg Oral Daily (warfarin) [employer], DO          Today, Patient Was Seen By: Jorge A Desir PA-C    ** Please Note: Dragon 360 Dictation voice to text software may have been used in the creation of this document   **

## 2021-03-06 NOTE — ASSESSMENT & PLAN NOTE
· Initiated on cholecalciferol 2000 I  U  PO Qdaily  · Recheck a vitamin D 25-OH level in 1-2 months with his PCP    Results for Cam Marr (MRN [id number]) as of 2/24/2021 12:28   Ref   Range 2/23/2021 05:26   Vit D, 25-Hydroxy Latest Ref Range: 30 0 - 100 0 ng/mL 13 1 (L)

## 2021-03-06 NOTE — ASSESSMENT & PLAN NOTE
· Likely hypervolemic hyponatremia  · Resolved with IV lasix treatment  · Increased lasix gtt @ 20 mg/hr on 02/28/2021, decreased lasix gtt to 10 mg/hr on 3/3/21 by cardiology  · Follow the sodium level      Results from last 7 days   Lab Units 03/06/21  0511 03/05/21  0434 03/04/21  0613   SODIUM mmol/L 137 139 140   POTASSIUM mmol/L 3 6 3 8 3 4*   CHLORIDE mmol/L 100 98* 99*   CO2 mmol/L 32 35* 35*   BUN mg/dL 27* 25 26*   CREATININE mg/dL 1 61* 1 65* 1 59*   CALCIUM mg/dL 9 3 9 4 9 4

## 2021-03-06 NOTE — ASSESSMENT & PLAN NOTE
Lab Results   Component Value Date    HGBA1C 6 1 (H) 01/05/2021       Recent Labs     03/05/21  1546 03/05/21  2106 03/06/21  0705 03/06/21  1101   POCGLU 103 161* 141* 138       Blood Sugar Average: Last 72 hrs:  (P) 744 [id number]     · Recently take off metformin as an outpatient secondary to his liver disease  · Not currently on any diabetic medications as an outpatient  · Consider the initiation of an ACE-inhibitor for renal protection in the setting of type 2 diabetes mellitus if his renal function remains stable  · Insulin sliding scale with blood glucose monitoring ACHS

## 2021-03-06 NOTE — ASSESSMENT & PLAN NOTE
· Continue PO coreg and PO digoxin  · Patient bradycardic- digoxin decreased to every other day dosing by cardiology on 3/5/21  · Continue anticoagulation with PO coumadin for a goal INR of 2-3  · Outpatient follow-up with Cardiology  Results for Malik Voss (MRN [id number]) as of 2/24/2021 12:39   Ref   Range 2/23/2021 05:27   DIGOXIN LEVEL Latest Ref Range: 0 8 - 2 0 ng/mL 1 7

## 2021-03-06 NOTE — ASSESSMENT & PLAN NOTE
· Initially required 4lpm of continuous supplemental oxygen via the nasal cannula to maintain oxygen saturations at 92% and above  · Successfully weaned off supplemental oxygen at this time  · Secondary to right-sided pleural effusion  · On the day of discharge, we will need to check an ambulatory pulse oximetry test to see if the patient qualifies for home supplemental oxygen  · Respiratory protocol  · Incentive spirometry 10 times per hour while awake

## 2021-03-06 NOTE — ASSESSMENT & PLAN NOTE
Wt Readings from Last 3 Encounters:   03/06/21 (!) 141 kg (310 lb 10 1 oz)   01/13/21 (!) 159 kg (351 lb 6 4 oz)   01/12/21 (!) 161 kg (356 lb)     · Troponin levels were within normal limits x 3 sets  · Transitioned to lasix gtt @ 20 mg/hr on 02/28/2021, this was decreased to 10 mg/hr on 3/3/21 by cardiology given creatinine elevation- this will be continued today  Patient's weight continues to trend down  331 lbs --> 320 lbs --> 315 lbs --> 313 lbs --> 310lbs today  · Metolazone 2 5 mg PO x1 on 2/28  · Continue Coreg 6 25 mg PO BID  · Monitor the patient's renal function closely while receiving the lasix gtt treatment  · Creatinine remains at baseline  Will continue with diuresis at same rate    · Daily weights  · Strict intake/output measurements  · PT/OT  · Cardiology and Nephrology recommendations appreciated

## 2021-03-06 NOTE — ASSESSMENT & PLAN NOTE
· Recurrent right-sided pleural effusion  · Successful right-sided thoracentesis by [facility] on 02/23/2021, which yielded 1600ml of pleural fluid  · Increased to lasix gtt @ 20 mg/hr on 02/28/2021, this was decreased to 10mg/hr on 3/3/21 by cardiology  · Appears to be a transudative pleural effusion by fluid analysis  · The patient was seen in consultation by Pulmonology

## 2021-03-06 NOTE — ASSESSMENT & PLAN NOTE
Lab Results   Component Value Date    EGFR 45 03/06/2021    EGFR 44 03/05/2021    EGFR 46 03/04/2021    CREATININE 1 61 (H) 03/06/2021    CREATININE 1 65 (H) 03/05/2021    CREATININE 1 59 (H) 03/04/2021     · Baseline serum creatinine of 1 3-1 6 mg/dl  · Avoid all nephrotoxic agents  · Serial laboratory testing to monitor the patient's renal function and electrolytes  · Check post-void residuals

## 2021-03-06 NOTE — PLAN OF CARE
Problem: Potential for Falls  Goal: Patient will remain free of falls  Description: INTERVENTIONS:  - Assess patient frequently for physical needs  -  Identify cognitive and physical deficits and behaviors that affect risk of falls    -  [place] fall precautions as indicated by assessment   - Educate patient/family on patient safety including physical limitations  - Instruct patient to call for assistance with activity based on assessment  - Modify environment to reduce risk of injury  - Consider OT/PT consult to assist with strengthening/mobility  Outcome: Progressing     Problem: CARDIOVASCULAR - ADULT  Goal: Maintains optimal cardiac output and hemodynamic stability  Description: INTERVENTIONS:  - Monitor I/O, vital signs and rhythm  - Monitor for S/S and trends of decreased cardiac output  - Administer and titrate ordered vasoactive medications to optimize hemodynamic stability  - Assess quality of pulses, skin color and temperature  - Assess for signs of decreased coronary artery perfusion  - Instruct patient to report change in severity of symptoms  Outcome: Progressing  Goal: Absence of cardiac dysrhythmias or at baseline rhythm  Description: INTERVENTIONS:  - Continuous cardiac monitoring, vital signs, obtain 12 lead EKG if ordered  - Administer antiarrhythmic and heart rate control medications as ordered  - Monitor electrolytes and administer replacement therapy as ordered  Outcome: Progressing     Problem: RESPIRATORY - ADULT  Goal: Achieves optimal ventilation and oxygenation  Description: INTERVENTIONS:  - Assess for changes in respiratory status  - Assess for changes in mentation and behavior  - Position to facilitate oxygenation and minimize respiratory effort  - Oxygen administered by appropriate delivery if ordered  - Initiate smoking cessation education as indicated  - Encourage broncho-pulmonary hygiene including cough, deep breathe, Incentive Spirometry  - Assess the need for suctioning and aspirate as needed  - Assess and instruct to report SOB or any respiratory difficulty  - Respiratory Therapy support as indicated  Outcome: Progressing     Problem: METABOLIC, FLUID AND ELECTROLYTES - ADULT  Goal: Electrolytes maintained within normal limits  Description: INTERVENTIONS:  - Monitor labs and assess patient for signs and symptoms of electrolyte imbalances  - Administer electrolyte replacement as ordered  - Monitor response to electrolyte replacements, including repeat lab results as appropriate  - Instruct patient on fluid and nutrition as appropriate  Outcome: Progressing  Goal: Fluid balance maintained  Description: INTERVENTIONS:  - Monitor labs   - Monitor I/O and WT  - Instruct patient on fluid and nutrition as appropriate  - Assess for signs & symptoms of volume excess or deficit  Outcome: Progressing  Goal: Glucose maintained within target range  Description: INTERVENTIONS:  - Monitor Blood Glucose as ordered  - Assess for signs and symptoms of hyperglycemia and hypoglycemia  - Administer ordered medications to maintain glucose within target range  - Assess nutritional intake and initiate nutrition service referral as needed  Outcome: Progressing     Problem: SAFETY ADULT  Goal: Patient will remain free of falls  Description: INTERVENTIONS:  - Assess patient frequently for physical needs  -  Identify cognitive and physical deficits and behaviors that affect risk of falls    -  [place] fall precautions as indicated by assessment   - Educate patient/family on patient safety including physical limitations  - Instruct patient to call for assistance with activity based on assessment  - Modify environment to reduce risk of injury  - Consider OT/PT consult to assist with strengthening/mobility  Outcome: Progressing  Goal: Maintain or return to baseline ADL function  Description: INTERVENTIONS:  -  Assess patient's ability to carry out ADLs; assess patient's baseline for ADL function and identify physical deficits which impact ability to perform ADLs (bathing, care of mouth/teeth, toileting, grooming, dressing, etc )  - Assess/evaluate cause of self-care deficits   - Assess range of motion  - Assess patient's mobility; develop plan if impaired  - Assess patient's need for assistive devices and provide as appropriate  - Encourage maximum independence but intervene and supervise when necessary  - Involve family in performance of ADLs  - Assess for home care needs following discharge   - Consider OT consult to assist with ADL evaluation and planning for discharge  - Provide patient education as appropriate  Outcome: Progressing  Goal: Maintain or return mobility status to optimal level  Description: INTERVENTIONS:  - Assess patient's baseline mobility status (ambulation, transfers, stairs, etc )    - Identify cognitive and physical deficits and behaviors that affect mobility  - Identify mobility aids required to assist with transfers and/or ambulation (gait belt, sit-to-stand, lift, walker, cane, etc )  - [place] fall precautions as indicated by assessment  - Record patient progress and toleration of activity level on Mobility SBAR; progress patient to next Phase/Stage  - Instruct patient to call for assistance with activity based on assessment  - Consider rehabilitation consult to assist with strengthening/weightbearing, etc   Outcome: Progressing     Problem: DISCHARGE PLANNING  Goal: Discharge to home or other facility with appropriate resources  Description: INTERVENTIONS:  - Identify barriers to discharge w/patient and caregiver  - Arrange for needed discharge resources and transportation as appropriate  - Identify discharge learning needs (meds, wound care, etc )  - Refer to Case Management Department for coordinating discharge planning if the patient needs post-hospital services based on physician/advanced practitioner order or complex needs related to functional status, cognitive ability, or social support system  Outcome: Progressing     Problem: Knowledge Deficit  Goal: Patient/family/caregiver demonstrates understanding of disease process, treatment plan, medications, and discharge instructions  Description: Complete learning assessment and assess knowledge base  Interventions:  - Provide teaching at level of understanding  - Provide teaching via preferred learning methods  Outcome: Progressing     Problem: Nutrition/Hydration-ADULT  Goal: Nutrient/Hydration intake appropriate for improving, restoring or maintaining nutritional needs  Description: Monitor and assess patient's nutrition/hydration status for malnutrition  Collaborate with interdisciplinary team and initiate plan and interventions as ordered  Monitor patient's weight and dietary intake as ordered or per policy  Utilize nutrition screening tool and intervene as necessary  Determine patient's food preferences and provide high-protein, high-caloric foods as appropriate       INTERVENTIONS:  - Monitor oral intake, urinary output, labs, and treatment plans  - Assess nutrition and hydration status and recommend course of action  - Evaluate amount of meals eaten  - Assist patient with eating if necessary   - Allow adequate time for meals  - Recommend/ encourage appropriate diets, oral nutritional supplements, and vitamin/mineral supplements  - Order, calculate, and assess calorie counts as needed  - Recommend, monitor, and adjust tube feedings and TPN/PPN based on assessed needs  - Assess need for intravenous fluids  - Provide specific nutrition/hydration education as appropriate  - Include patient/family/caregiver in decisions related to nutrition  Outcome: Progressing

## 2021-03-07 NOTE — ASSESSMENT & PLAN NOTE
Wt Readings from Last 3 Encounters:   03/07/21 (!) 139 kg (306 lb)   01/13/21 (!) 159 kg (351 lb 6 4 oz)   01/12/21 (!) 161 kg (356 lb)     · Troponin levels were within normal limits x 3 sets  · Transitioned to lasix gtt @ 20 mg/hr on 02/28/2021, this was decreased to 10 mg/hr on 3/3/21 by cardiology given creatinine elevation- this will be continued today  Patient's weight continues to trend down  331 lbs --> 320 lbs --> 315 lbs --> 313 lbs --> 310lbs --> 306lbs today  · Metolazone 2 5 mg PO x1 on 2/28  · Continue Coreg 6 25 mg PO BID  · Monitor the patient's renal function closely while receiving the lasix gtt treatment  · Creatinine remains at baseline still with weight loss and signs of volume overload  Will continue with diuresis at same rate  Plan for in person cardiology evaluation tomorrow for further guidance  · Continue daily weights, Strict intake/output measurements  · PT/OT  · Cardiology and Nephrology recommendations appreciated

## 2021-03-07 NOTE — PROGRESS NOTES
Progress Note - Morena Henning 1960, 57 y o  male MRN: [id number]    Unit/Bed#: 410-01 Encounter: [id number]    Primary Care Provider: Jakob Dodd DO   Date and time admitted to hospital: 2/22/2021  9:27 AM        * Acute on chronic diastolic CHF (congestive heart failure) ([facility] [address] )  Assessment & Plan  Wt Readings from Last 3 Encounters:   03/07/21 (!) 139 kg (306 lb)   01/13/21 (!) 159 kg (351 lb 6 4 oz)   01/12/21 (!) 161 kg (356 lb)     · Troponin levels were within normal limits x 3 sets  · Transitioned to lasix gtt @ 20 mg/hr on 02/28/2021, this was decreased to 10 mg/hr on 3/3/21 by cardiology given creatinine elevation- this will be continued today  Patient's weight continues to trend down  331 lbs --> 320 lbs --> 315 lbs --> 313 lbs --> 310lbs --> 306lbs today  · Metolazone 2 5 mg PO x1 on 2/28  · Continue Coreg 6 25 mg PO BID  · Monitor the patient's renal function closely while receiving the lasix gtt treatment  · Creatinine remains at baseline still with weight loss and signs of volume overload  Will continue with diuresis at same rate  Plan for in person cardiology evaluation tomorrow for further guidance  · Continue daily weights, Strict intake/output measurements  · PT/OT  · Cardiology and Nephrology recommendations appreciated  Acute respiratory failure with hypoxia (HCC)  Assessment & Plan  · Initially required 4lpm of continuous supplemental oxygen via the nasal cannula to maintain oxygen saturations at 92% and above  · Successfully weaned off supplemental oxygen at this time  · Secondary to right-sided pleural effusion  · On the day of discharge, we will need to check an ambulatory pulse oximetry test to see if the patient qualifies for home supplemental oxygen  · Respiratory protocol  · Incentive spirometry 10 times per hour while awake  Cirrhosis of liver with ascites [facility])  Assessment & Plan  · The patient was seen in consultation by Gastroenterology    · Avoid all hepatotoxic agents  · Outpatient hepatocellular carcinoma surveillance and follow-up with Gastroenterology  US liver doppler only  Status: Final result   PACS Images     Show images for US liver doppler only   Study Result    ABDOMEN ULTRASOUND, COMPLETE WITH DOPPLER     INDICATION:    Cirrhosis with ascites        COMPARISON:  Compared with 10/2/2020     TECHNIQUE:   Real-time ultrasound of the abdomen was performed with a curvilinear transducer with Doppler evaluation of liver      FINDINGS:     The main portal vein and primary branch segments are patent and hepatopetal with normal spectral waveform  Peak systolic velocity at 32 cm/s  The right at 42 cm/s and the left at 59 cm/s      Hepatic veins are patent  Spectral waveforms within normal limits  Right hepatic vein at 57 cm/s, mid hepatic vein at 56 cm/s and left hepatic vein at 77 cm/s        Main hepatic artery appears normal size, patent with normal spectral waveform      Splenic vein is patent  56 cm/s      IMPRESSION:     Patent portal vein and hepatic veins  Pleural effusion on right  Assessment & Plan  · Recurrent right-sided pleural effusion  · Successful right-sided thoracentesis by [facility] on 02/23/2021, which yielded 1600ml of pleural fluid  · Increased to lasix gtt @ 20 mg/hr on 02/28/2021, this was decreased to 10mg/hr on 3/3/21 by cardiology  · Appears to be a transudative pleural effusion by fluid analysis  · The patient was seen in consultation by Pulmonology  · Consider repeat chest xray if worsening shortness of breath or hypoxia      Anemia  Assessment & Plan  · Vitamin B12 level, and folate level WNL  · Iron panel: decreased iron and low normal ferritin, initiated ferrous sulfate PO daily  · Check the patient's stool for occult blood x 3 specimens: negative x 1  · Follow the CBC  · Transfuse for a hemoglobin less than 7 g/dl     Results from last 7 days   Lab Units 03/07/21  0457 03/06/21  6293 03/05/21  0434   WBC Thousand/uL 5 26 4 92 4 98   HEMOGLOBIN g/dL 9 3* 9 4* 9 6*   HEMATOCRIT % 31 8* 32 2* 32 6*   PLATELETS Thousands/uL 186 178 201         Atelectasis  Assessment & Plan  · Incentive spirometry 10 times per hour while awake    Vitamin D deficiency  Assessment & Plan  · Initiated on cholecalciferol 2000 I  U  PO Qdaily  · Recheck a vitamin D 25-OH level in 1-2 months with his PCP  Results for Ronan Martinez (MRN [id number]) as of 2/24/2021 12:28   Ref  Range 2/23/2021 05:26   Vit D, 25-Hydroxy Latest Ref Range: 30 0 - 100 0 ng/mL 13 1 (L)           Pulmonary hypertension (HCC)  Assessment & Plan  · Outpatient follow-up with Pulmonology    Constipation  Assessment & Plan  · Received miralax 17 grams PO x 1 dose on 02/24/2021  · The constipation has improved  · Continue colace 100 mg PO BID and dulcolax 10 mg PO Qdaily after dinner  Hyponatremia  Assessment & Plan  · Likely hypervolemic hyponatremia  · Resolved with IV lasix treatment  · Increased lasix gtt @ 20 mg/hr on 02/28/2021, decreased lasix gtt to 10 mg/hr on 3/3/21 by cardiology  · Follow the sodium level  Results from last 7 days   Lab Units 03/07/21  0457 03/06/21  0511 03/05/21  0434   SODIUM mmol/L 140 137 139   POTASSIUM mmol/L 3 5 3 6 3 8   CHLORIDE mmol/L 101 100 98*   CO2 mmol/L 35* 32 35*   BUN mg/dL 28* 27* 25   CREATININE mg/dL 1 57* 1 61* 1 65*   CALCIUM mg/dL 9 2 9 3 9 4         Tobacco use  Assessment & Plan  · Nicotine patch  · Smoking cessation counseling  Type 2 diabetes mellitus with hyperglycemia, without long-term current use of insulin [facility])  Assessment & Plan  Lab Results   Component Value Date    HGBA1C 6 1 (H) 01/05/2021       Recent Labs     03/06/21  1548 03/06/21  2048 03/07/21  0719 03/07/21  1102   POCGLU 125 140 137 116       Blood Sugar Average: Last 72 hrs:  (P) 135 6     · Recently take off metformin as an outpatient secondary to his liver disease    · Not currently on any diabetic medications as an outpatient  · Consider the initiation of an ACE-inhibitor for renal protection in the setting of type 2 diabetes mellitus if his renal function remains stable  · Insulin sliding scale with blood glucose monitoring ACHS  Lesion of spleen  Assessment & Plan  · CT scan of the abdomen/pelvis (02/22/2021): Subcapsular cystic lesion in the spleen (probably related to old trauma)   Outpatient follow-up with PCP in regards to this matter  Diverticulosis of colon  Assessment & Plan  · Outpatient follow-up with Gastroenterology  Permanent atrial fibrillation (HCC)  Assessment & Plan  · Continue PO coreg and PO digoxin  · Patient bradycardic- digoxin decreased to every other day dosing by cardiology on 3/5/21  · Continue anticoagulation with PO coumadin for a goal INR of 2-3  · Outpatient follow-up with Cardiology  Results for Kristy Shoemaker (MRN [id number]) as of 2/24/2021 12:39   Ref  Range 2/23/2021 05:27   DIGOXIN LEVEL Latest Ref Range: 0 8 - 2 0 ng/mL 1 7       Lactic acidosis  Assessment & Plan  · Resolved with thiamine 200 mg IV x 1 dose on 02/22/2021  Hypokalemia  Assessment & Plan  K+ of 3 3  Resolved with potassium chloride supplementation  Continue to monitor BMP and Mg     CKD (chronic kidney disease) stage 3, GFR 30-59 ml/min  Assessment & Plan  Lab Results   Component Value Date    EGFR 47 03/07/2021    EGFR 45 03/06/2021    EGFR 44 03/05/2021    CREATININE 1 57 (H) 03/07/2021    CREATININE 1 61 (H) 03/06/2021    CREATININE 1 65 (H) 03/05/2021     · Baseline serum creatinine of 1 3-1 6 mg/dl  · Avoid all nephrotoxic agents  · Serial laboratory testing to monitor the patient's renal function and electrolytes  · Check post-void residuals      STEFF (obstructive sleep apnea)  Assessment & Plan  · Continue BIPAP at 17/11 QHS and anytime while sleeping        VTE Pharmacologic Prophylaxis:   Pharmacologic: Warfarin (Coumadin)  Mechanical VTE Prophylaxis in Place: Yes        Time Spent for Care: 30 minutes  More than 50% of total time spent on counseling and coordination of care as described above  Current Length of Stay: 13 day(s)    Current Patient Status: Inpatient   Certification Statement: The patient will continue to require additional inpatient hospital stay due to need for IV lasix infusion  Discharge Plan / Estimated Discharge Date: TBD      Code Status: Level 1 - Full Code      Subjective:   Patient continues to feel well  No SOB  Feels LE edema is slowly improving  Objective:   Vitals:   Temp (24hrs), Av 4 °F (36 9 °C), Min:97 9 °F (36 6 °C), Max:98 6 °F (37 °C)    Temp:  [97 9 °F (36 6 °C)-98 6 °F (37 °C)] 97 9 °F (36 6 °C)  HR:  [57-70] 59  Resp:  [18] 18  BP: (110-116)/(51-58) 110/51  SpO2:  [95 %-96 %] 96 %  Body mass index is 42 68 kg/m²  Input and Output Summary (last 24 hours): Intake/Output Summary (Last 24 hours) at 3/7/2021 1157  Last data filed at 3/7/2021 0401  Gross per 24 hour   Intake 480 ml   Output 1600 ml   Net -1120 ml       Physical Exam:     Physical Exam  Vitals signs and nursing note reviewed  Constitutional:       General: He is not in acute distress  Appearance: Normal appearance  He is not ill-appearing  HENT:      Head: Normocephalic  Mouth/Throat:      Mouth: Mucous membranes are moist    Cardiovascular:      Rate and Rhythm: Normal rate and regular rhythm  Heart sounds: No murmur  Pulmonary:      Effort: Pulmonary effort is normal  No respiratory distress  Breath sounds: Normal breath sounds  Abdominal:      General: Bowel sounds are normal       Tenderness: There is no abdominal tenderness  Musculoskeletal:      Right lower leg: Edema (diffuse pitting and non-pitting edema of the BLE extending proximal to the bilateral thighs ) present  Left lower leg: Edema present  Skin:     General: Skin is warm and dry     Neurological:      Mental Status: He is alert and oriented to person, place, and time  Psychiatric:         Mood and Affect: Mood normal          Additional Data:   Labs:    Results from last 7 days   Lab Units 03/07/21  0457   WBC Thousand/uL 5 26   HEMOGLOBIN g/dL 9 3*   HEMATOCRIT % 31 8*   PLATELETS Thousands/uL 186   NEUTROS PCT % 73   LYMPHS PCT % 15   MONOS PCT % 6   EOS PCT % 5     Results from last 7 days   Lab Units 03/07/21  0457  03/03/21  0512   POTASSIUM mmol/L 3 5   < > 3 7   CHLORIDE mmol/L 101   < > 97*   CO2 mmol/L 35*   < > 35*   BUN mg/dL 28*   < > 27*   CREATININE mg/dL 1 57*   < > 1 79*   CALCIUM mg/dL 9 2   < > 9 2   ALK PHOS U/L  --   --  136*   ALT U/L  --   --  21   AST U/L  --   --  21    < > = values in this interval not displayed  Results from last 7 days   Lab Units 03/06/21  0511   INR  2 41*       * I Have Reviewed All Lab Data Listed Above  * Additional Pertinent Lab Tests Reviewed: All Labs Within Last 24 Hours Reviewed    Imaging:  Imaging Reports Reviewed Today Include: no new imaging to review          Recent Cultures (last 7 days):           Last 24 Hours Medication List:   Current Facility-Administered Medications   Medication Dose Route Frequency Provider Last Rate    bisacodyl  10 mg Oral After Jose Ritchie, DO      carvedilol  6 25 mg Oral BID With Meals [employer], DO      cholecalciferol  2,000 Units Oral Daily [employer], DO      digoxin  250 mcg Oral Every Other Day Neelam Mendiola PA-C      docusate sodium  100 mg Oral BID [employer], DO      ferrous sulfate  325 mg Oral Daily With Breakfast Isatu Marin MD      furosemide  10 mg/hr Intravenous Continuous Neelam Mendiola PA-C 10 mg/hr (03/05/21 6743)    insulin lispro  1-6 Units Subcutaneous HS [employer], DO      insulin lispro  2-12 Units Subcutaneous TID AC [employer], DO      nicotine  1 patch Transdermal Daily [employer], DO      oxyCODONE  5 mg Oral Q4H PRN [employer], DO      pantoprazole  40 mg Oral Early Morning Sheryle Marek, DO      polyvinyl alcohol  2 drop Both Eyes Q3H PRN Sheryle Marek, DO      potassium chloride  20 mEq Oral TID With Meals TRES Curtis      warfarin  5 mg Oral Daily (warfarin) Sheryle Marek, DO          Today, Patient Was Seen By: Lamar Vo PA-C    ** Please Note: Dragon 360 Dictation voice to text software may have been used in the creation of this document   **

## 2021-03-07 NOTE — ASSESSMENT & PLAN NOTE
· Vitamin B12 level, and folate level WNL  · Iron panel: decreased iron and low normal ferritin, initiated ferrous sulfate PO daily  · Check the patient's stool for occult blood x 3 specimens: negative x 1  · Follow the CBC  · Transfuse for a hemoglobin less than 7 g/dl     Results from last 7 days   Lab Units 03/07/21  0457 03/06/21  0511 03/05/21  0434   WBC Thousand/uL 5 26 4 92 4 98   HEMOGLOBIN g/dL 9 3* 9 4* 9 6*   HEMATOCRIT % 31 8* 32 2* 32 6*   PLATELETS Thousands/uL 186 178 201

## 2021-03-07 NOTE — PROGRESS NOTES
[facility] PROGRESS NOTE   Jason Louis 57 y o  male MRN: [id number]  Unit/Bed#: 410-01 Encounter: [id number]  Reason for Consult:  Acute kidney injury on CKD    ASSESSMENT and PLAN:  57-year-old male with a past medical history of CKD, chronic diastolic CHF, cirrhosis who presented on 02/22/2021 for dyspnea and progressive weight gain  Nephrology following for management of acute kidney injury on CKD  1  Acute kidney injury on CKD:  · Creatinine 1 3 on admission peaking at 1 79 on 03/03 then declining  ? Creatinine plateauing near 1 6 the last several days  ? Continues to have a good response to IV Lasix infusion  ? CT of the chest abdomen and pelvis with contrast on 02/22:  No hydronephrosis  · Etiology:  Contrast exposure 2/22  Aggressive diuresis  May need to accept higher creatinine for euvolemia  · Plan:  Continue IV Lasix infusion  Massive volume overload  Patient encouraged to elevate lower extremities at rest  2  Chronic kidney disease stage 3:    · Baseline creatinine near 1 3 mg/dL  3  Hypertension:  · Blood pressure acceptable  4  Acute on chronic diastolic CHF:    · Diuresed with Lasix infusion per Cardiology  ? Dose tapered to 10 milligrams/hour on 03/03  Continues to have good response  Weight appears to have decline 44 lb since admission  ? On fluid restriction, salt restricted diet  ? Management per Cardiology- Cardiology continuing Lasix infusion as long as patient continues to diurese and renal function remains stable  · Last chest x-ray 2/23 shows mild cardiomegaly, decrease in right pleural effusion  · Per cardiology's note at discharge recommend Lasix 40 mg twice a day and metolazone twice a week  5  Metabolic alkalosis:  Bicarbonate level 35, improved  Try to keep potassium level near 4 0 which will help with bicarbonate excretion  6  Atrial fibrillation/ Bradycardia:  Digoxin dose adjusted to every other day  7  Hypokalemia:  Potassium 3 5  Continue to replete    On 60 mEq of potassium chloride daily  Will given extra 20 mEq today  8  Anemia:  Hemoglobin 9 3  Hemoglobin stable  On oral iron  Management per primary team  9  Cirrhosis:  Appears to be quiescent at this time       DISPOSITION:  Lasix infusion per Cardiology  Discussed with primary service    SUBJECTIVE / 24H INTERVAL HISTORY:  No acute complaints  Still has significant lower extremity edema  No shortness of breath  Appetite intact  OBJECTIVE:  Current Weight: Weight - Scale: (!) 139 kg (306 lb)  Vitals:    03/06/21 2050 03/07/21 0024 03/07/21 0600 03/07/21 0720   BP: 116/57 113/52  110/51   BP Location:    Right arm   Pulse: 57 70  59   Resp: 18 18  18   Temp: 98 6 °F (37 °C) 98 4 °F (36 9 °C)  97 9 °F (36 6 °C)   TempSrc:    Oral   SpO2: 95% 95%  96%   Weight:   (!) 139 kg (306 lb)    Height:           Intake/Output Summary (Last 24 hours) at 3/7/2021 1253  Last data filed at 3/7/2021 1215  Gross per 24 hour   Intake 720 ml   Output 2200 ml   Net -1480 ml     General: NAD  Skin: no rash  Eyes: anicteric sclera  ENT: moist mucous membrane  Neck: supple  Chest: CTA b/l, no ronchii, no wheeze, no rubs, no rales  Normal effort at rest   On room air  CVS: s1s2, no murmur, no gallop, no rub    Normal rate, regular rhythm  Abdomen: soft, nontender, nl sounds  Extremities:  Severe edema LE b/l  : no leahy  Neuro: AAOX3  Psych: normal affect  Medications:    Current Facility-Administered Medications:     bisacodyl (DULCOLAX) EC tablet 10 mg, 10 mg, Oral, After Elio Carrow, DO, 10 mg at 03/06/21 1742    carvedilol (COREG) tablet 6 25 mg, 6 25 mg, Oral, BID With Meals, [employer], DO, 6 25 mg at 03/06/21 8778    cholecalciferol (VITAMIN D3) tablet 2,000 Units, 2,000 Units, Oral, Daily, [employer], DO, 2,000 Units at 03/07/21 0911    digoxin (LANOXIN) tablet 250 mcg, 250 mcg, Oral, Every Other Day, Tia Maury, PA-C, 250 mcg at 03/07/21 0911    docusate sodium (COLACE) capsule 100 mg, 100 mg, Oral, BID, Spicer Gibes, DO, 100 mg at 03/07/21 0911    ferrous sulfate tablet 325 mg, 325 mg, Oral, Daily With Breakfast, Isatu Judd MD, 325 mg at 03/07/21 0911    furosemide (LASIX) 500 mg infusion 50 mL, 10 mg/hr, Intravenous, Continuous, Tia Maury, PA-C, Last Rate: 1 mL/hr at 03/05/21 2356, 10 mg/hr at 03/05/21 2356    insulin lispro (HumaLOG) 100 units/mL subcutaneous injection 1-6 Units, 1-6 Units, Subcutaneous, HS, Spicer Gibes, DO, 1 Units at 03/05/21 2129    insulin lispro (HumaLOG) 100 units/mL subcutaneous injection 2-12 Units, 2-12 Units, Subcutaneous, TID AC, 2 Units at 03/04/21 1149 **AND** Fingerstick Glucose (POCT), , , TID AC, Spicer Gibes, DO    nicotine (NICODERM CQ) 7 mg/24hr TD 24 hr patch 1 patch, 1 patch, Transdermal, Daily, Spicer Gibes, DO    oxyCODONE (ROXICODONE) IR tablet 5 mg, 5 mg, Oral, Q4H PRN, Spicer Gibes, DO, 5 mg at 03/05/21 0827    pantoprazole (PROTONIX) EC tablet 40 mg, 40 mg, Oral, Early Morning, Rocco Lawrence DO, 40 mg at 03/07/21 0500    polyvinyl alcohol (LIQUIFILM TEARS) 1 4 % ophthalmic solution 2 drop, 2 drop, Both Eyes, Q3H PRN, Spicer Gibes, DO, 2 drop at 02/26/21 1709    potassium chloride (K-DUR,KLOR-CON) CR tablet 20 mEq, 20 mEq, Oral, TID With Meals, TRES Harrell, 20 mEq at 03/07/21 1107    warfarin (COUMADIN) tablet 5 mg, 5 mg, Oral, Daily (warfarin), Spicer Gibes, DO, 5 mg at 03/06/21 1742    Laboratory Results:  Results from last 7 days   Lab Units 03/07/21  0457 03/06/21  0511 03/05/21  0434 03/04/21  0613 03/03/21  0512 03/02/21  0436 03/01/21  0626   WBC Thousand/uL 5 26 4 92 4 98 4 76 5 43 5 94 5 81   HEMOGLOBIN g/dL 9 3* 9 4* 9 6* 9 3* 9 6* 9 1* 9 2*   HEMATOCRIT % 31 8* 32 2* 32 6* 31 2* 32 0* 30 4* 32 1*   PLATELETS Thousands/uL 186 178 201 197 188 185 187   POTASSIUM mmol/L 3 5 3 6 3 8 3 4* 3 7 3 3* 3 4*   CHLORIDE mmol/L 101 100 98* 99* 97* 99* 101   CO2 mmol/L 35* 32 35* 35* 35* 37* 34*   BUN mg/dL 28* 27* 25 26* 27* 26* 27*   CREATININE mg/dL 1 57* 1 61* 1 65* 1 59* 1 79* 1 66* 1 59*   CALCIUM mg/dL 9 2 9 3 9 4 9 4 9 2 9 4 9 2   MAGNESIUM mg/dL  --  2 3  --   --  2 1 2 0 2 3   PHOSPHORUS mg/dL  --   --   --   --   --  3 8 3 8

## 2021-03-07 NOTE — ASSESSMENT & PLAN NOTE
Lab Results   Component Value Date    HGBA1C 6 1 (H) 01/05/2021       Recent Labs     03/06/21  1548 03/06/21  2048 03/07/21  0719 03/07/21  1102   POCGLU 125 140 137 116       Blood Sugar Average: Last 72 hrs:  (P) 135 6     · Recently take off metformin as an outpatient secondary to his liver disease  · Not currently on any diabetic medications as an outpatient  · Consider the initiation of an ACE-inhibitor for renal protection in the setting of type 2 diabetes mellitus if his renal function remains stable  · Insulin sliding scale with blood glucose monitoring ACHS

## 2021-03-07 NOTE — ASSESSMENT & PLAN NOTE
· Likely hypervolemic hyponatremia  · Resolved with IV lasix treatment  · Increased lasix gtt @ 20 mg/hr on 02/28/2021, decreased lasix gtt to 10 mg/hr on 3/3/21 by cardiology  · Follow the sodium level      Results from last 7 days   Lab Units 03/07/21  0457 03/06/21  0511 03/05/21  0434   SODIUM mmol/L 140 137 139   POTASSIUM mmol/L 3 5 3 6 3 8   CHLORIDE mmol/L 101 100 98*   CO2 mmol/L 35* 32 35*   BUN mg/dL 28* 27* 25   CREATININE mg/dL 1 57* 1 61* 1 65*   CALCIUM mg/dL 9 2 9 3 9 4

## 2021-03-07 NOTE — ASSESSMENT & PLAN NOTE
Lab Results   Component Value Date    EGFR 47 03/07/2021    EGFR 45 03/06/2021    EGFR 44 03/05/2021    CREATININE 1 57 (H) 03/07/2021    CREATININE 1 61 (H) 03/06/2021    CREATININE 1 65 (H) 03/05/2021     · Baseline serum creatinine of 1 3-1 6 mg/dl  · Avoid all nephrotoxic agents  · Serial laboratory testing to monitor the patient's renal function and electrolytes  · Check post-void residuals

## 2021-03-07 NOTE — ASSESSMENT & PLAN NOTE
· Continue PO coreg and PO digoxin  · Patient bradycardic- digoxin decreased to every other day dosing by cardiology on 3/5/21  · Continue anticoagulation with PO coumadin for a goal INR of 2-3  · Outpatient follow-up with Cardiology  Results for Kristina York (MRN [id number]) as of 2/24/2021 12:39   Ref   Range 2/23/2021 05:27   DIGOXIN LEVEL Latest Ref Range: 0 8 - 2 0 ng/mL 1 7

## 2021-03-07 NOTE — ASSESSMENT & PLAN NOTE
· Initiated on cholecalciferol 2000 I  U  PO Qdaily  · Recheck a vitamin D 25-OH level in 1-2 months with his PCP  Results for Jeanie Barber (MRN [id number]) as of 2/24/2021 12:28   Ref   Range 2/23/2021 05:26   Vit D, 25-Hydroxy Latest Ref Range: 30 0 - 100 0 ng/mL 13 1 (L)

## 2021-03-08 NOTE — PROGRESS NOTES
Progress Note - Tiffanie Cervantes 1960, 57 y o  male MRN: [id number]    Unit/Bed#: 410-01 Encounter: [id number]    Primary Care Provider: Sonia Quick DO   Date and time admitted to hospital: 2/22/2021  9:27 AM        * Acute on chronic diastolic CHF (congestive heart failure) [facility])  Assessment & Plan  Wt Readings from Last 3 Encounters:   03/08/21 (!) 137 kg (302 lb 4 oz)   01/13/21 (!) 159 kg (351 lb 6 4 oz)   01/12/21 (!) 161 kg (356 lb)     · Troponin levels were within normal limits x 3 sets  · Initiated on a lasix gtt  · Increased lasix gtt @ 20 mg/hr on 02/28/2021, decreased lasix gtt to 10 mg/hr on 03/03/2021 by Cardiology  · Continue the lasix gtt at 10 mg/hr on 03/08/2021  · The patient was treated with metolazone 2 5 mg PO x 1 dose on 02/28/2021  · Continue Coreg 6 25 mg PO BID  · Monitor the patient's renal function closely while receiving the lasix gtt treatment  · Creatinine remains at baseline still with weight loss and signs of volume overload  Will continue with diuresis at same rate  · Continue daily weights, Strict intake/output measurements  · PT/OT  · Cardiology and Nephrology recommendations appreciated  Pleural effusion on right  Assessment & Plan  · Recurrent right-sided pleural effusion  · Successful right-sided thoracentesis by Intervention Radiology on 02/23/2021, which yielded 1600ml of pleural fluid  · Initiated on a lasix gtt  · Increased lasix gtt @ 20 mg/hr on 02/28/2021, decreased lasix gtt to 10 mg/hr on 03/03/2021 by Cardiology  · Continue the lasix gtt at 10 mg/hr on 03/08/2021  · Appears to be a transudative pleural effusion by fluid analysis  · The patient was seen in consultation by Pulmonology  · Consider repeat chest xray if worsening shortness of breath or hypoxia      Acute respiratory failure with hypoxia (HCC)  Assessment & Plan  · Initially required 4 lpm of continuous supplemental oxygen via the nasal cannula to maintain oxygen saturations at 92% and above  · Successfully weaned off supplemental oxygen at this time  · Secondary to right-sided pleural effusion  · On the day of discharge, we will need to check an ambulatory pulse oximetry test to see if the patient qualifies for home supplemental oxygen  · Respiratory protocol  · Incentive spirometry 10 times per hour while awake  Cirrhosis of liver with ascites [facility])  Assessment & Plan  · The patient was seen in consultation by Gastroenterology  · Avoid all hepatotoxic agents  · Outpatient hepatocellular carcinoma surveillance and follow-up with Gastroenterology  US liver doppler only  Status: Final result   PACS Images     Show images for US liver doppler only   Study Result    ABDOMEN ULTRASOUND, COMPLETE WITH DOPPLER     INDICATION:    Cirrhosis with ascites        COMPARISON:  Compared with 10/2/2020     TECHNIQUE:   Real-time ultrasound of the abdomen was performed with a curvilinear transducer with Doppler evaluation of liver      FINDINGS:     The main portal vein and primary branch segments are patent and hepatopetal with normal spectral waveform  Peak systolic velocity at 32 cm/s  The right at 42 cm/s and the left at 59 cm/s      Hepatic veins are patent  Spectral waveforms within normal limits  Right hepatic vein at 57 cm/s, mid hepatic vein at 56 cm/s and left hepatic vein at 77 cm/s        Main hepatic artery appears normal size, patent with normal spectral waveform      Splenic vein is patent  56 cm/s      IMPRESSION:     Patent portal vein and hepatic veins           Anemia  Assessment & Plan  · Vitamin B12 level, and folate level WNL  · Iron panel: decreased iron and low normal ferritin, initiated ferrous sulfate PO daily  · Check the patient's stool for occult blood x 3 specimens: negative x 1  · Follow the CBC  · Transfuse for a hemoglobin less than 7 g/dl     Results from last 7 days   Lab Units 03/08/21  0537 03/07/21  0457 03/06/21  0511   WBC Thousand/uL 5 39 5 26 4 92   HEMOGLOBIN g/dL 9 4* 9 3* 9 4*   HEMATOCRIT % 31 8* 31 8* 32 2*   PLATELETS Thousands/uL 193 186 178         Atelectasis  Assessment & Plan  · Incentive spirometry 10 times per hour while awake    Vitamin D deficiency  Assessment & Plan  · Initiated on cholecalciferol 2000 I  U  PO Qdaily  · Recheck a vitamin D 25-OH level in 1-2 months with his PCP  Results for Raphael Womack (MRN [id number]) as of 2/24/2021 12:28   Ref  Range 2/23/2021 05:26   Vit D, 25-Hydroxy Latest Ref Range: 30 0 - 100 0 ng/mL 13 1 (L)           Pulmonary hypertension (HCC)  Assessment & Plan  · Outpatient follow-up with Pulmonology    Constipation  Assessment & Plan  · Received miralax 17 grams PO x 1 dose on 02/24/2021  · The constipation has improved  · Continue colace 100 mg PO BID and dulcolax 10 mg PO Qdaily after dinner  Hyponatremia  Assessment & Plan  · Likely hypervolemic hyponatremia  · Resolved with IV lasix treatment  · Initiated on a lasix gtt  · Increased lasix gtt @ 20 mg/hr on 02/28/2021, decreased lasix gtt to 10 mg/hr on 03/03/2021 by Cardiology  · Continue the lasix gtt at 10 mg/hr on 03/08/2021  · Follow the sodium level  Results from last 7 days   Lab Units 03/08/21  0537 03/07/21  0457 03/06/21  0511   SODIUM mmol/L 139 140 137   POTASSIUM mmol/L 3 6 3 5 3 6   CHLORIDE mmol/L 99* 101 100   CO2 mmol/L 33* 35* 32   BUN mg/dL 27* 28* 27*   CREATININE mg/dL 1 67* 1 57* 1 61*   CALCIUM mg/dL 9 3 9 2 9 3         Tobacco use  Assessment & Plan  · Nicotine patch  · Smoking cessation counseling      Type 2 diabetes mellitus with hyperglycemia, without long-term current use of insulin [facility])  Assessment & Plan  Lab Results   Component Value Date    HGBA1C 6 1 (H) 01/05/2021       Recent Labs     03/07/21  1458 03/07/21  2048 03/08/21  0745 03/08/21  1120   POCGLU 116 122 115 127       Blood Sugar Average: Last 72 hrs:  (P) 127 [id number]     · Recently take off metformin as an outpatient secondary to his liver disease  · Not currently on any diabetic medications as an outpatient  · Consider the initiation of an ACE-inhibitor for renal protection in the setting of type 2 diabetes mellitus if his renal function remains stable  · Insulin sliding scale with blood glucose monitoring ACHS  Lesion of spleen  Assessment & Plan  · CT scan of the abdomen/pelvis (02/22/2021): Subcapsular cystic lesion in the spleen (probably related to old trauma)   Outpatient follow-up with PCP in regards to this matter  Diverticulosis of colon  Assessment & Plan  · Outpatient follow-up with Gastroenterology  Permanent atrial fibrillation ([facility] [address] )  Assessment & Plan  · Continue PO coreg  · The patient developed bradycardia, and the digoxin decreased to every other day dosing by Cardiology on 3/5/21  · The digoxin was discontinued on 03/08/2021 by Cardiology  · Continue anticoagulation with PO coumadin for a goal INR of 2-3  · Outpatient follow-up with Cardiology  Results for [facility] (MRN [id number]) as of 2/24/2021 12:39   Ref  Range 2/23/2021 05:27   DIGOXIN LEVEL Latest Ref Range: 0 8 - 2 0 ng/mL 1 7       Lactic acidosis  Assessment & Plan  · Resolved with thiamine 200 mg IV x 1 dose on 02/22/2021  Hypokalemia  Assessment & Plan  · Resolved with potassium chloride supplementation    · Continue potassium chloride 40 meQ PO BID for now while on the lasix gtt  · Follow the potassium level and magnesium level    Results from last 7 days   Lab Units 03/08/21  0537 03/07/21  0457 03/06/21  0511   SODIUM mmol/L 139 140 137   POTASSIUM mmol/L 3 6 3 5 3 6   CHLORIDE mmol/L 99* 101 100   CO2 mmol/L 33* 35* 32   BUN mg/dL 27* 28* 27*   CREATININE mg/dL 1 67* 1 57* 1 61*   CALCIUM mg/dL 9 3 9 2 9 3         CKD (chronic kidney disease) stage 3, GFR 30-59 ml/min  Assessment & Plan  Lab Results   Component Value Date    EGFR 43 03/08/2021    EGFR 47 03/07/2021    EGFR 45 03/06/2021    CREATININE 1 67 (H) 03/08/2021 CREATININE 1 57 (H) 2021    CREATININE 1 61 (H) 2021     · Baseline serum creatinine of 1 3-1 6 mg/dl  · Avoid all nephrotoxic agents  · Serial laboratory testing to monitor the patient's renal function and electrolytes  · Check post-void residuals  · The patient was seen in consultation by Nephrology and will need continued outpatient follow-up with Nephrology  STEFF (obstructive sleep apnea)  Assessment & Plan  · Continue BIPAP at 17/11 QHS and anytime while sleeping        VTE Pharmacologic Prophylaxis:   Pharmacologic: Warfarin (Coumadin) for a goal INR of 2-3  Mechanical VTE Prophylaxis in Place: Yes    Patient Centered Rounds: I have performed bedside rounds with nursing staff today  Discussions with Specialists or Other Care Team Provider: I discussed the case with Cardiology and with Nephrology  Time Spent for Care: 30 minutes  More than 50% of total time spent on counseling and coordination of care as described above  Current Length of Stay: 14 day(s)    Current Patient Status: Inpatient   Certification Statement: The patient will continue to require additional inpatient hospital stay due to the need for an IV lasix drip/infusion  Code Status: Level 1 - Full Code      Subjective: The patient was seen and examined  The patient is doing better  No chest pain  No shortness of breath  No abdominal pain  No nausea or vomiting  Objective:     Vitals:   Temp (24hrs), Av 4 °F (36 9 °C), Min:98 1 °F (36 7 °C), Max:98 9 °F (37 2 °C)    Temp:  [98 1 °F (36 7 °C)-98 9 °F (37 2 °C)] 98 1 °F (36 7 °C)  HR:  [64-71] 64  Resp:  [18] 18  BP: (110-115)/(52-59) 115/59  SpO2:  [94 %] 94 %  Body mass index is 42 16 kg/m²  Input and Output Summary (last 24 hours):        Intake/Output Summary (Last 24 hours) at 3/8/2021 1225  Last data filed at 3/8/2021 1001  Gross per 24 hour   Intake 1260 ml   Output 2550 ml   Net -1290 ml       Physical Exam:     Physical Exam  General:  NAD, follows commands  HEENT:  NC/AT, mucous membranes moist  Neck:  Supple, No JVP elevation  CV:  + S1, + S2, Irregularly irregular rhythm, Regular rate  Pulm:  Lung fields are CTA bilaterally  Abd:  Soft, Non-tender, Mild distension  Ext:  No clubbing/cyanosis, Improving edema of the bilateral lower extremities  Skin:  No rashes  Neuro:  Awake, alert, oriented  Psych:  Normal mood and affect      Additional Data:    Labs:    Results from last 7 days   Lab Units 03/08/21  0537   WBC Thousand/uL 5 39   HEMOGLOBIN g/dL 9 4*   HEMATOCRIT % 31 8*   PLATELETS Thousands/uL 193   NEUTROS PCT % 71   LYMPHS PCT % 16   MONOS PCT % 7   EOS PCT % 5     Results from last 7 days   Lab Units 03/08/21  0537  03/03/21  0512   SODIUM mmol/L 139   < > 139   POTASSIUM mmol/L 3 6   < > 3 7   CHLORIDE mmol/L 99*   < > 97*   CO2 mmol/L 33*   < > 35*   BUN mg/dL 27*   < > 27*   CREATININE mg/dL 1 67*   < > 1 79*   ANION GAP mmol/L 7   < > 7   CALCIUM mg/dL 9 3   < > 9 2   ALBUMIN g/dL  --   --  2 9*   TOTAL BILIRUBIN mg/dL  --   --  0 80   ALK PHOS U/L  --   --  136*   ALT U/L  --   --  21   AST U/L  --   --  21   GLUCOSE RANDOM mg/dL 123   < > 133    < > = values in this interval not displayed  Results from last 7 days   Lab Units 03/08/21  0537   INR  2 34*     Results from last 7 days   Lab Units 03/08/21  1120 03/08/21  0745 03/07/21  2048 03/07/21  1458 03/07/21  1102 03/07/21  0719 03/06/21  2048 03/06/21  1548 03/06/21  1101 03/06/21  0705 03/05/21  2106 03/05/21  1546   POC GLUCOSE mg/dl 127 115 122 116 116 137 140 125 138 141* 161* 103                   * I Have Reviewed All Lab Data Listed Above  * Additional Pertinent Lab Tests Reviewed:  Zac [facility] Admission Reviewed      Recent Cultures (last 7 days):           Last 24 Hours Medication List:   Current Facility-Administered Medications   Medication Dose Route Frequency Provider Last Rate    bisacodyl  10 mg Oral After DO Socorro Raymond carvedilol  6 25 mg Oral BID With Meals Heather Rodriguez, DO      cholecalciferol  2,000 Units Oral Daily Heather Rodriguez, DO      docusate sodium  100 mg Oral BID Heather Rodriguez, DO      ferrous sulfate  325 mg Oral Daily With Breakfast Isatu Armando MD      furosemide  10 mg/hr Intravenous Continuous Tia Maury, PA-C 10 mg/hr (03/08/21 0530)    insulin lispro  1-6 Units Subcutaneous HS Heather Rodriguez, DO      insulin lispro  2-12 Units Subcutaneous TID AC Heather Rodriguez, DO      nicotine  1 patch Transdermal Daily Heather Rodriguez, DO      oxyCODONE  5 mg Oral Q4H PRN Heather Rodriguez, DO      pantoprazole  40 mg Oral Early Morning Heather Rodriguez, DO      polyvinyl alcohol  2 drop Both Eyes Q3H PRN Heather Rodriguez, DO      potassium chloride  40 mEq Oral BID With Meals Heather Rodriguez, DO      warfarin  5 mg Oral Daily (warfarin) Heather Rodriguez,           Today, Patient Was Seen By: Heather Rodriguez DO    ** Please Note: Dictation voice to text software may have been used in the creation of this document   **

## 2021-03-08 NOTE — UTILIZATION REVIEW
Continued Stay Review    Date: 3/2/21                        Current Patient Class: inpatient  Current Level of Care: med surg/telemetry  HPI:61 y o  male initially admitted on 2/22/21  Assessment/Plan:   Acute on chronic diastolic CHF  Favorable response to diuresis with 13 lb weight loss past 24 hours and negative balance of 6 6 L  Continue Lasix drip at 20 mg/hr  RLL decreased breath sounds, BLE pitting edema improving       Transitioned to lasix gtt @ 20 mg/hr on 02/28/2021, patient lost 11lbs on 3/1, lost 13 lbs today (344 -> 331lbs)    Pertinent Labs/Diagnostic Results:   Results from last 7 days   Lab Units 03/08/21  0537 03/07/21  0457 03/06/21  0511 03/05/21  0434 03/04/21  0613   WBC Thousand/uL 5 39 5 26 4 92 4 98 4 76   HEMOGLOBIN g/dL 9 4* 9 3* 9 4* 9 6* 9 3*   HEMATOCRIT % 31 8* 31 8* 32 2* 32 6* 31 2*   PLATELETS Thousands/uL 193 186 178 201 197   NEUTROS ABS Thousands/µL 3 89 3 84 3 58 3 57 3 47     Results from last 7 days   Lab Units 03/08/21  0537 03/07/21  0457 03/06/21  0511 03/05/21  0434 03/04/21  0613 03/03/21  0512 03/02/21  0436   SODIUM mmol/L 139 140 137 139 140 139 140   POTASSIUM mmol/L 3 6 3 5 3 6 3 8 3 4* 3 7 3 3*   CHLORIDE mmol/L 99* 101 100 98* 99* 97* 99*   CO2 mmol/L 33* 35* 32 35* 35* 35* 37*   ANION GAP mmol/L 7 4 5 6 6 7 4   BUN mg/dL 27* 28* 27* 25 26* 27* 26*   CREATININE mg/dL 1 67* 1 57* 1 61* 1 65* 1 59* 1 79* 1 66*   EGFR ml/min/1 73sq m 43 47 45 44 46 40 44   CALCIUM mg/dL 9 3 9 2 9 3 9 4 9 4 9 2 9 4   MAGNESIUM mg/dL  --   --  2 3  --   --  2 1 2 0   PHOSPHORUS mg/dL  --   --   --   --   --   --  3 8     Results from last 7 days   Lab Units 03/03/21  0512 03/02/21  0436   AST U/L 21 17   ALT U/L 21 19   ALK PHOS U/L 136* 130*   TOTAL PROTEIN g/dL 8 1 7 7   ALBUMIN g/dL 2 9* 2 7*   TOTAL BILIRUBIN mg/dL 0 80 0 80     Results from last 7 days   Lab Units 03/08/21  1120 03/08/21  0745 03/07/21  2048 03/07/21  1458 03/07/21  1102 03/07/21  0719 03/06/21  2048 03/06/21  1548 03/06/21  1101 03/06/21  0705 03/05/21  2106 03/05/21  1546   POC GLUCOSE mg/dl 127 115 122 116 116 137 140 125 138 141* 161* 103     Results from last 7 days   Lab Units 03/08/21  0537 03/07/21  0457 03/06/21  0511 03/05/21  0434 03/04/21  0613 03/03/21  0512 03/02/21  0436   GLUCOSE RANDOM mg/dL 123 116 129 131 128 133 128     Results from last 7 days   Lab Units 03/08/21  0537 03/06/21  0511 03/05/21  0434   PROTIME seconds 25 1* 25 7* 25 4*   INR  2 34* 2 41* 2 38*     Results from last 7 days   Lab Units 03/04/21  0613   DIGOXIN LVL ng/mL 1 7     Results from last 7 days   Lab Units 03/01/21  1434   CREATININE UR mg/dL 18 0     Vital Signs:   Blood pressure (!) 104/46, pulse 63, temperature 98 1 °F (36 7 °C), temperature source Oral, resp  rate 17, height 5' 11" (1 803 m), weight (!) 150 kg (331 lb 2 1 oz), SpO2 96 %  ,Body mass index is 46 18 kg/m²  Intake/Output Summary (Last 24 hours) at 3/2/2021 0949  Last data filed at 3/2/2021 4702      Gross per 24 hour   Intake 1380 ml   Output 7125 ml   Net -5745 ml     Medications:   Scheduled Medications:  bisacodyl, 10 mg, Oral, After Dinner  carvedilol, 6 25 mg, Oral, BID With Meals  cholecalciferol, 2,000 Units, Oral, Daily  docusate sodium, 100 mg, Oral, BID  ferrous sulfate, 325 mg, Oral, Daily With Breakfast  insulin lispro, 1-6 Units, Subcutaneous, HS  insulin lispro, 2-12 Units, Subcutaneous, TID AC  nicotine, 1 patch, Transdermal, Daily  pantoprazole, 40 mg, Oral, Early Morning  potassium chloride, 40 mEq, Oral, BID With Meals  warfarin, 5 mg, Oral, Daily (warfarin)    Continuous IV Infusions:  furosemide, 20 mg/hr, Intravenous, Continuous    PRN Meds:  oxyCODONE, 5 mg, Oral, Q4H PRN  polyvinyl alcohol, 2 drop, Both Eyes, Q3H PRN      Discharge Plan: tbd    Network Utilization Review Department  ATTENTION: Please call with any questions or concerns to [phone] and carefully listen to the prompts so that you are directed to the right person   All voicemails are confidential   Peace Pereira all requests for admission clinical reviews, approved or denied determinations and any other requests to dedicated fax number below belonging to the campus where the patient is receiving treatment   List of dedicated fax numbers for the Facilities:  [address] DENIALS (Administrative/Medical Necessity) [phone]   [address] (Maternity/NICU/Pediatrics) [phone] [address] (  [facility] [address]) [address] [place] [address] [facility] [address] [place] [address] [phone]

## 2021-03-08 NOTE — ASSESSMENT & PLAN NOTE
· Resolved with potassium chloride supplementation    · Continue potassium chloride 40 meQ PO BID for now while on the lasix gtt  · Follow the potassium level and magnesium level    Results from last 7 days   Lab Units 03/08/21  0537 03/07/21  0457 03/06/21  0511   SODIUM mmol/L 139 140 137   POTASSIUM mmol/L 3 6 3 5 3 6   CHLORIDE mmol/L 99* 101 100   CO2 mmol/L 33* 35* 32   BUN mg/dL 27* 28* 27*   CREATININE mg/dL 1 67* 1 57* 1 61*   CALCIUM mg/dL 9 3 9 2 9 3

## 2021-03-08 NOTE — ASSESSMENT & PLAN NOTE
· Initiated on cholecalciferol 2000 I  U  PO Qdaily  · Recheck a vitamin D 25-OH level in 1-2 months with his PCP  Results for Seth Burroughs (MRN [id number]) as of 2/24/2021 12:28   Ref   Range 2/23/2021 05:26   Vit D, 25-Hydroxy Latest Ref Range: 30 0 - 100 0 ng/mL 13 1 (L)

## 2021-03-08 NOTE — PROGRESS NOTES
Progress Note - Nephrology   Elizabeth Carrillo 57 y o  male MRN: [id number]  Unit/Bed#: 410-01 Encounter: [id number]    A/P:  1  Acute kidney injury on top of chronic kidney disease   Other creatinine is above typical baseline, it is been steady with aggressive diuresis  Will continue aggressive diuresis as directed by our cardiology colleagues  Continue to avoid potential nephrotoxic medications  2  Chronic kidney disease stage 3 with baseline creatinine around 1 3 mg/dL  3  Probable underlying hypertensive nephrosclerosis   Blood pressures continue to do well, will continue current medications  4  Metabolic alkalosis   Continue diurese, need to keep a close eye on the patient's serum bicarbonate which at times increases and other diet and decreases on its own  Would continue to avoid Diamox at this time as it is not yet clinically indicated  5  Acute on chronic diastolic congestive heart failure   Patient is on a Lasix infusion, continue with dose according to Cardiology  Will defer to Cardiology colleagues regarding dosing of discharge oral medications from a diuretic standpoint  6  Hypokalemia   Continue potassium supplementation, patient received a total of 80 mEq today        Follow up reason for today's visit:  Acute kidney injury/chronic kidney disease/hypertension/alkalosis/electrolyte disorders    Acute on chronic diastolic CHF (congestive heart failure) ([facility])    Patient Active Problem List   Diagnosis    Atrial fibrillation ([facility] )    Chronic diastolic congestive heart failure ([facility] )    Diabetes mellitus, type 2 ([facility])    Class 3 severe obesity due to excess calories with serious comorbidity and body mass index (BMI) of 45 0 to 49 9 in adult [facility])    Lymphedema    Long term (current) use of anticoagulants    Chronic venous insufficiency    Varicose veins of both lower extremities with inflammation    Hypertension    STEFF (obstructive sleep apnea)    Pleural effusion on right    SOB (shortness of breath)    Cirrhosis of liver with ascites (HCC)    CKD (chronic kidney disease) stage 3, GFR 30-59 ml/min    Hyperkalemia    Tobacco abuse    Chronic anemia    Thrombocytopenia (HCC)    Hypokalemia    Other cirrhosis of liver ([facility] [facility] )    Encounter for screening for other viral diseases     Lactic acidosis    Permanent atrial fibrillation (HCC)    Diverticulosis of colon    Lesion of spleen    Type 2 diabetes mellitus with hyperglycemia, without long-term current use of insulin (HCC)    Acute on chronic diastolic CHF (congestive heart failure) (HCC)    Acute respiratory failure with hypoxia (HCC)    Tobacco use    Hyponatremia    Constipation    Pulmonary hypertension (HCC)    Vitamin D deficiency    Atelectasis    Anemia         Subjective:   No acute events overnight, patient is eating and drinking appropriately  Objective:     Vitals: Blood pressure 115/59, pulse 64, temperature 98 1 °F (36 7 °C), temperature source Oral, resp  rate 18, height 5' 11" (1 803 m), weight (!) 137 kg (302 lb 4 oz), SpO2 94 %  ,Body mass index is 42 16 kg/m²  Weight (last 2 days)     Date/Time   Weight    03/08/21 0600   (!) 137 (302 25)    03/07/21 0600   (!) 139 (306)    03/06/21 0703   (!) 141 (310 63)                Intake/Output Summary (Last 24 hours) at 3/8/2021 1219  Last data filed at 3/8/2021 1001  Gross per 24 hour   Intake 1260 ml   Output 2550 ml   Net -1290 ml     I/O last 3 completed shifts:   In: 1200 [P O :1200]  Out: 3000 [Urine:3000]         Physical Exam: /59 (BP Location: Right arm)   Pulse 64   Temp 98 1 °F (36 7 °C) (Oral)   Resp 18   Ht 5' 11" (1 803 m)   Wt (!) 137 kg (302 lb 4 oz)   SpO2 94%   BMI 42 16 kg/m²     General Appearance:    Alert, cooperative, no distress, appears stated age   Head:    Normocephalic, without obvious abnormality, atraumatic   Eyes:    Conjunctiva/corneas clear   Ears:    Normal external ears   Nose:   Nares normal, septum midline, mucosa normal, no drainage    or sinus tenderness   Throat:   Lips, mucosa, and tongue normal; teeth and gums normal   Neck:   Supple   Back:     Symmetric, no curvature, ROM normal, no CVA tenderness   Lungs:     Clear to auscultation bilaterally, respirations unlabored   Chest wall:    No tenderness or deformity   Heart:    Regular rate and rhythm, S1 and S2 normal, no murmur, rub   or gallop   Abdomen:     Soft, non-tender, bowel sounds active   Extremities:   Extremities normal, atraumatic, no cyanosis, +3 bilateral lower extremity edema   Skin:   Skin color, texture, turgor normal, no rashes or lesions   Lymph nodes:   Cervical normal   Neurologic:   CNII-XII intact            Lab, Imaging and other studies: I have personally reviewed pertinent labs  CBC:   Lab Results   Component Value Date    WBC 5 39 03/08/2021    HGB 9 4 (L) 03/08/2021    HCT 31 8 (L) 03/08/2021    MCV 90 03/08/2021     03/08/2021    MCH 26 7 (L) 03/08/2021    MCHC 29 6 (L) 03/08/2021    RDW 16 8 (H) 03/08/2021    MPV 11 3 03/08/2021    NRBC 0 03/08/2021     CMP:   Lab Results   Component Value Date    K 3 6 03/08/2021    CL 99 (L) 03/08/2021    CO2 33 (H) 03/08/2021    BUN 27 (H) 03/08/2021    CREATININE 1 67 (H) 03/08/2021    CALCIUM 9 3 03/08/2021    EGFR 43 03/08/2021         Results from last 7 days   Lab Units 03/08/21  0537 03/07/21  0457 03/06/21  0511  03/03/21  0512 03/02/21  0436   POTASSIUM mmol/L 3 6 3 5 3 6   < > 3 7 3 3*   CHLORIDE mmol/L 99* 101 100   < > 97* 99*   CO2 mmol/L 33* 35* 32   < > 35* 37*   BUN mg/dL 27* 28* 27*   < > 27* 26*   CREATININE mg/dL 1 67* 1 57* 1 61*   < > 1 79* 1 66*   CALCIUM mg/dL 9 3 9 2 9 3   < > 9 2 9 4   ALK PHOS U/L  --   --   --   --  136* 130*   ALT U/L  --   --   --   --  21 19   AST U/L  --   --   --   --  21 17    < > = values in this interval not displayed           Phosphorus: No results found for: PHOS  Magnesium: No results found for: MG  Urinalysis: No results found for: COLORU, CLARITYU, SPECGRAV, PHUR, LEUKOCYTESUR, NITRITE, PROTEINUA, GLUCOSEU, KETONESU, BILIRUBINUR, BLOODU  Ionized Calcium: No results found for: CAION  Coagulation:   Lab Results   Component Value Date    INR 2 34 (H) 03/08/2021     Troponin: No results found for: TROPONINI  ABG: No results found for: PHART, OUY5GMX, PO2ART, PEB0VBJ, O6KVUZEW, BEART, SOURCE  Radiology review:     IMAGING  No results found      Current Facility-Administered Medications   Medication Dose Route Frequency    bisacodyl (DULCOLAX) EC tablet 10 mg  10 mg Oral After Dinner    carvedilol (COREG) tablet 6 25 mg  6 25 mg Oral BID With Meals    cholecalciferol (VITAMIN D3) tablet 2,000 Units  2,000 Units Oral Daily    docusate sodium (COLACE) capsule 100 mg  100 mg Oral BID    ferrous sulfate tablet 325 mg  325 mg Oral Daily With Breakfast    furosemide (LASIX) 500 mg infusion 50 mL  10 mg/hr Intravenous Continuous    insulin lispro (HumaLOG) 100 units/mL subcutaneous injection 1-6 Units  1-6 Units Subcutaneous HS    insulin lispro (HumaLOG) 100 units/mL subcutaneous injection 2-12 Units  2-12 Units Subcutaneous TID AC    nicotine (NICODERM CQ) 7 mg/24hr TD 24 hr patch 1 patch  1 patch Transdermal Daily    oxyCODONE (ROXICODONE) IR tablet 5 mg  5 mg Oral Q4H PRN    pantoprazole (PROTONIX) EC tablet 40 mg  40 mg Oral Early Morning    polyvinyl alcohol (LIQUIFILM TEARS) 1 4 % ophthalmic solution 2 drop  2 drop Both Eyes Q3H PRN    potassium chloride (K-DUR,KLOR-CON) CR tablet 40 mEq  40 mEq Oral BID With Meals    warfarin (COUMADIN) tablet 5 mg  5 mg Oral Daily (warfarin)     Medications Discontinued During This Encounter   Medication Reason    acetaminophen (TYLENOL) tablet 650 mg     furosemide (LASIX) injection 40 mg     potassium chloride (K-DUR,KLOR-CON) CR tablet 20 mEq     potassium chloride (K-DUR,KLOR-CON) CR tablet 20 mEq     potassium chloride (K-DUR,KLOR-CON) CR tablet 20 mEq     digoxin (LANOXIN) tablet 250 mcg     potassium chloride (K-DUR,KLOR-CON) CR tablet 20 mEq     digoxin (LANOXIN) tablet 250 mcg        Seema Mayorga, DO      This progress note was produced in part using a dictation device which may document imprecise wording from author's original intent

## 2021-03-08 NOTE — UTILIZATION REVIEW
Continued Stay Review    Date: 3/7/21                          Current Patient Class: inpatient  Current Level of Care: med surg    HPI:61 y o  male initially admitted on 2/22/21  Assessment/Plan:   Creatinine remains at baseline still with weight loss and signs of volume overload  Will continue with diuresis at same rate  Re-consult cardio for further guidance  Diffuse pitting & non-pitting edema BLE extending proximal to the bilateral thighs  Lasix gtt decreased to 10 mg/hr on 3/3/21 by cardiology given creatinine elevation- this will be continued today  Patient's weight continues to trend down  331 lbs --> 320 lbs --> 315 lbs --> 313 lbs --> 310lbs --> 306lbs today     ·   Pertinent Labs/Diagnostic Results:   Results from last 7 days   Lab Units 03/08/21  0537 03/07/21  0457 03/06/21  0511 03/05/21  0434 03/04/21  0613   WBC Thousand/uL 5 39 5 26 4 92 4 98 4 76   HEMOGLOBIN g/dL 9 4* 9 3* 9 4* 9 6* 9 3*   HEMATOCRIT % 31 8* 31 8* 32 2* 32 6* 31 2*   PLATELETS Thousands/uL 193 186 178 201 197   NEUTROS ABS Thousands/µL 3 89 3 84 3 58 3 57 3 47     Results from last 7 days   Lab Units 03/08/21  0537 03/07/21  0457 03/06/21  0511 03/05/21  0434 03/04/21  0613 03/03/21  0512 03/02/21  0436   SODIUM mmol/L 139 140 137 139 140 139 140   POTASSIUM mmol/L 3 6 3 5 3 6 3 8 3 4* 3 7 3 3*   CHLORIDE mmol/L 99* 101 100 98* 99* 97* 99*   CO2 mmol/L 33* 35* 32 35* 35* 35* 37*   ANION GAP mmol/L 7 4 5 6 6 7 4   BUN mg/dL 27* 28* 27* 25 26* 27* 26*   CREATININE mg/dL 1 67* 1 57* 1 61* 1 65* 1 59* 1 79* 1 66*   EGFR ml/min/1 73sq m 43 47 45 44 46 40 44   CALCIUM mg/dL 9 3 9 2 9 3 9 4 9 4 9 2 9 4   MAGNESIUM mg/dL  --   --  2 3  --   --  2 1 2 0   PHOSPHORUS mg/dL  --   --   --   --   --   --  3 8     Results from last 7 days   Lab Units 03/03/21  0512 03/02/21  0436   AST U/L 21 17   ALT U/L 21 19   ALK PHOS U/L 136* 130*   TOTAL PROTEIN g/dL 8 1 7 7   ALBUMIN g/dL 2 9* 2 7*   TOTAL BILIRUBIN mg/dL 0 80 0 80     Results from last 7 days   Lab Units 21  1120 21  0745 21  2048 21  1458 21  1102 21  0719 21  2048 21  1548 21  1101 21  0705 21  2106 21  1546   POC GLUCOSE mg/dl 127 115 122 116 116 137 140 125 138 141* 161* 103     Results from last 7 days   Lab Units 21  0537 21  0457 21  0511 21  0434 21  0613 21  0512 21  0436   GLUCOSE RANDOM mg/dL 123 116 129 131 128 133 128     Results from last 7 days   Lab Units 21  0537 21  0511 21  0434   PROTIME seconds 25 1* 25 7* 25 4*   INR  2 34* 2 41* 2 38*     Results from last 7 days   Lab Units 21  0613   DIGOXIN LVL ng/mL 1 7     Results from last 7 days   Lab Units 21  1434   CREATININE UR mg/dL 18 0     Vital Signs:   Temp (24hrs), Av 4 °F (36 9 °C), Min:97 9 °F (36 6 °C), Max:98 6 °F (37 °C)  Temp:  [97 9 °F (36 6 °C)-98 6 °F (37 °C)] 97 9 °F (36 6 °C)  HR:  [57-70] 59  Resp:  [18] 18  BP: (110-116)/(51-58) 110/51  SpO2:  [95 %-96 %] 96 %  Body mass index is 42 68 kg/m²       Intake/Output Summary (Last 24 hours) at 3/7/2021 1253  Last data filed at 3/7/2021 1215  Gross per 24 hour   Intake 720 ml   Output 2200 ml   Net -1480 ml     Medications:   bisacodyl, 10 mg, Oral, After Dinner  carvedilol, 6 25 mg, Oral, BID With Meals  cholecalciferol, 2,000 Units, Oral, Daily  docusate sodium, 100 mg, Oral, BID  ferrous sulfate, 325 mg, Oral, Daily With Breakfast  insulin lispro, 1-6 Units, Subcutaneous, HS  insulin lispro, 2-12 Units, Subcutaneous, TID AC  nicotine, 1 patch, Transdermal, Daily  pantoprazole, 40 mg, Oral, Early Morning  potassium chloride, 40 mEq, Oral, BID With Meals  warfarin, 5 mg, Oral, Daily (warfarin)    Continuous IV Infusions:  furosemide, 10 mg/hr, Intravenous, Continuous    PRN Meds:  oxyCODONE, 5 mg, Oral, Q4H PRN  polyvinyl alcohol, 2 drop, Both Eyes, Q3H PRN    Discharge Plan: d    Network Utilization Review Department  ATTENTION: Please call with any questions or concerns to [phone] and carefully listen to the prompts so that you are directed to the right person  All voicemails are confidential   Cisco Dong all requests for admission clinical reviews, approved or denied determinations and any other requests to dedicated fax number below belonging to the campus where the patient is receiving treatment   List of dedicated fax numbers for the Facilities:  [address] DENIALS (Administrative/Medical Necessity) [phone]   [address] (Maternity/NICU/Pediatrics) [phone]   [address] [place] [address] Dr Segun Watson 8267 (  Mello Mckenna "Laureen" 103) [address] [address] [facility] [address] [place]) [address] [place] [address] [phone]

## 2021-03-08 NOTE — PLAN OF CARE
Problem: PHYSICAL THERAPY ADULT  Goal: Performs mobility at highest level of function for planned discharge setting  See evaluation for individualized goals  Description: Treatment/Interventions: LE strengthening/ROM, Functional transfer training, Elevations, Therapeutic exercise, Endurance training, Gait training, Bed mobility          See flowsheet documentation for full assessment, interventions and recommendations  Outcome: Progressing  Note: Prognosis: Good  Problem List: Decreased endurance, Impaired balance, Decreased mobility  Assessment: Pt  seen for PT treatment session this date with interventions consisting of  transfers and  gait training w/ emphasis on improving pt's ability to ambulate  Pt  Currently performing  tx and ambulation at (SUP ) x 1 level of function  The patient's AM-PAC Basic Mobility Inpatient Short Form Raw Score is 23, Standardized Score is 50  88  A standardized score greater than 42 9 suggests the patient may benefit from discharge to home  Please also refer to physical therapy recommendation for safe DC planning  In comparison to previous session, Pt  With improvements in activity tolerance  Good tolerance to increased distance  Pt is in need of continued activity in PT to improve strength balance endurance mobility transfers and ambulation with return to maximize LOF  From PT/mobility standpoint, recommendation at time of d/c would be home in order to promote return to PLOF and independence  PT Discharge Recommendation: Return to previous environment with no needs          See flowsheet documentation for full assessment

## 2021-03-08 NOTE — ASSESSMENT & PLAN NOTE
Lab Results   Component Value Date    EGFR 43 03/08/2021    EGFR 47 03/07/2021    EGFR 45 03/06/2021    CREATININE 1 67 (H) 03/08/2021    CREATININE 1 57 (H) 03/07/2021    CREATININE 1 61 (H) 03/06/2021     · Baseline serum creatinine of 1 3-1 6 mg/dl  · Avoid all nephrotoxic agents  · Serial laboratory testing to monitor the patient's renal function and electrolytes  · Check post-void residuals  · The patient was seen in consultation by Nephrology and will need continued outpatient follow-up with Nephrology

## 2021-03-08 NOTE — ASSESSMENT & PLAN NOTE
· Recurrent right-sided pleural effusion  · Successful right-sided thoracentesis by Intervention Radiology on 02/23/2021, which yielded 1600ml of pleural fluid  · Initiated on a lasix gtt  · Increased lasix gtt @ 20 mg/hr on 02/28/2021, decreased lasix gtt to 10 mg/hr on 03/03/2021 by Cardiology  · Continue the lasix gtt at 10 mg/hr on 03/08/2021  · Appears to be a transudative pleural effusion by fluid analysis  · The patient was seen in consultation by Pulmonology  · Consider repeat chest xray if worsening shortness of breath or hypoxia

## 2021-03-08 NOTE — PROGRESS NOTES
Cardiology Progress Note - Caroline Fritz 57 y o  male MRN: [id number]    Unit/Bed#: 410-01 Encounter: [id number]    Assessment/Plan:  1  Acute on chronic diastolic congestive heart failure              - still continues to diurese on lasix gtt   - weight today 302 lbs, overall weight is down 67 lbs since admission   - would continue lasix gtt at 10 mg/hr as long as patient is diuresing and creatinine remains stable   - continue daily weights, low sodium diet, fluid restriction, strict I/O   - monitor daily BMP     2  Chronic atrial fibrillation              - continues to have occasionally slow rates on telemetry   - digoxin was reduced to every other day dosing, will discontinue digoxin completely for now   - continue carvedilol   - continue anticoagulation with coumadin for goal INR 2-3     3  Hypertension               - controlled on current regimen       Subjective:   Patient seen and examined  Continues to diurese well  Offers no complaints  Review of Systems   Constitution: Negative for chills and fever  HENT: Negative  Cardiovascular: Positive for dyspnea on exertion and leg swelling  Negative for chest pain, near-syncope, orthopnea, palpitations, paroxysmal nocturnal dyspnea and syncope  Respiratory: Negative for cough and shortness of breath  Gastrointestinal: Negative for diarrhea, nausea and vomiting  Genitourinary: Negative  Neurological: Negative for dizziness and light-headedness  All other systems reviewed and are negative  Objective:   Vitals: Blood pressure 115/59, pulse 64, temperature 98 1 °F (36 7 °C), temperature source Oral, resp  rate 18, height 5' 11" (1 803 m), weight (!) 137 kg (302 lb 4 oz), SpO2 94 %  , Body mass index is 42 16 kg/m² ,   Orthostatic Blood Pressures      Most Recent Value   Blood Pressure  115/59 filed at 03/08/2021 5911   Patient Position - Orthostatic VS  Sitting filed at 03/08/2021 2492         Systolic (86DGL), FCT:493 , Min:110 , Max:115 Diastolic (79OMO), KWY:05, Min:52, Max:59      Intake/Output Summary (Last 24 hours) at 3/8/2021 1044  Last data filed at 3/8/2021 1001  Gross per 24 hour   Intake 1500 ml   Output 3150 ml   Net -1650 ml     Weight (last 2 days)     Date/Time   Weight    03/08/21 0600   (!) 137 (302 25)    03/07/21 0600   (!) 139 (306)    03/06/21 0703   (!) 141 (310 63)                Telemetry Review: atrial fibrillation, sometimes with slow rates overnight  EKG personally reviewed by Kaity Cevallos PA-C  Physical Exam  Vitals signs reviewed  Constitutional:       General: He is not in acute distress  Appearance: He is well-developed  He is obese  He is not diaphoretic  HENT:      Head: Normocephalic and atraumatic  Eyes:      Pupils: Pupils are equal, round, and reactive to light  Neck:      Musculoskeletal: Normal range of motion  Vascular: No carotid bruit  Cardiovascular:      Rate and Rhythm: Normal rate  Rhythm irregularly irregular  Pulses:           Radial pulses are 2+ on the right side and 2+ on the left side  Dorsalis pedis pulses are 2+ on the right side and 2+ on the left side  Heart sounds: S1 normal and S2 normal  No murmur  Pulmonary:      Effort: Pulmonary effort is normal  No respiratory distress  Breath sounds: Normal breath sounds  No wheezing or rales  Abdominal:      General: There is no distension  Palpations: Abdomen is soft  Tenderness: There is no abdominal tenderness  Musculoskeletal: Normal range of motion  Right lower leg: Edema present  Left lower leg: Edema present  Skin:     General: Skin is warm and dry  Findings: No erythema  Comments: Venous stasis changes in bilateral lower extremities   Neurological:      General: No focal deficit present  Mental Status: He is alert and oriented to person, place, and time     Psychiatric:         Mood and Affect: Mood normal          Behavior: Behavior normal  Laboratory Results:        CBC with diff:   Results from last 7 days   Lab Units 03/08/21  0537 03/07/21  0457 03/06/21  0511 03/05/21  0434 03/04/21  0613 03/03/21  0512 03/02/21  0436   WBC Thousand/uL 5 39 5 26 4 92 4 98 4 76 5 43 5 94   HEMOGLOBIN g/dL 9 4* 9 3* 9 4* 9 6* 9 3* 9 6* 9 1*   HEMATOCRIT % 31 8* 31 8* 32 2* 32 6* 31 2* 32 0* 30 4*   MCV fL 90 91 92 91 90 90 91   PLATELETS Thousands/uL 193 186 178 201 197 188 185   MCH pg 26 7* 26 6* 26 7* 26 8 26 9 27 0 27 2   MCHC g/dL 29 6* 29 2* 29 2* 29 4* 29 8* 30 0* 29 9*   RDW % 16 8* 16 7* 16 6* 16 6* 16 5* 16 5* 16 8*   MPV fL 11 3 11 6 11 4 11 1 10 5 10 6 10 9   NRBC AUTO /100 WBCs 0 0 0 0 0 0 0         CMP:  Results from last 7 days   Lab Units 03/08/21  0537 03/07/21  0457 03/06/21  0511 03/05/21  0434 03/04/21  0613 03/03/21  0512 03/02/21  0436   POTASSIUM mmol/L 3 6 3 5 3 6 3 8 3 4* 3 7 3 3*   CHLORIDE mmol/L 99* 101 100 98* 99* 97* 99*   CO2 mmol/L 33* 35* 32 35* 35* 35* 37*   BUN mg/dL 27* 28* 27* 25 26* 27* 26*   CREATININE mg/dL 1 67* 1 57* 1 61* 1 65* 1 59* 1 79* 1 66*   CALCIUM mg/dL 9 3 9 2 9 3 9 4 9 4 9 2 9 4   AST U/L  --   --   --   --   --  21 17   ALT U/L  --   --   --   --   --  21 19   ALK PHOS U/L  --   --   --   --   --  136* 130*   EGFR ml/min/1 73sq m 43 47 45 44 46 40 44         BMP:  Results from last 7 days   Lab Units 03/08/21  0537 03/07/21  0457 03/06/21  0511 03/05/21  0434 03/04/21  0613 03/03/21  0512 03/02/21  0436   POTASSIUM mmol/L 3 6 3 5 3 6 3 8 3 4* 3 7 3 3*   CHLORIDE mmol/L 99* 101 100 98* 99* 97* 99*   CO2 mmol/L 33* 35* 32 35* 35* 35* 37*   BUN mg/dL 27* 28* 27* 25 26* 27* 26*   CREATININE mg/dL 1 67* 1 57* 1 61* 1 65* 1 59* 1 79* 1 66*   CALCIUM mg/dL 9 3 9 2 9 3 9 4 9 4 9 2 9 4       BNP: No results for input(s): BNP in the last 72 hours      Magnesium:   Results from last 7 days   Lab Units 03/06/21  0511 03/03/21  0512 03/02/21  0436   MAGNESIUM mg/dL 2 3 2 1 2 0       Coags:   Results from last 7 days Lab Units 21  0537 21  0511 21  0434 21  0512 21  0436   INR  2 34* 2 41* 2 38* 2 36* 2 36*       TSH:        Hemoglobin A1C       Lipid Profile:       Cardiac testing:   Results for orders placed during the hospital encounter of 21   Echo complete with contrast if indicated    Narrative [address]  [address], [place] [address]  [phone]    Transthoracic Echocardiogram  2D, M-mode, Doppler, and Color Doppler    Study date:  2021    Patient: Hkaeem Blas  MR number: [id number]  Account number: [de-identified]  : 1960  Age: 57 years  Gender: Male  Status: Inpatient  Location: Bedside  Height: 71 in  Weight: 350 lb  BP: 130/ 80 mmHg    Indications: shortness of breath    Diagnoses: 428 0 - CONGESTIVE HEART FAILURE    Sonographer:  Uriel 61, CCT  Primary Physician:  Perico Hebert DO  Referring Physician:  Denise Ruiz DO  Group:  [facility]  Interpreting Physician:  Kristy Emery DO    SUMMARY    LEFT VENTRICLE:  Systolic function was normal  Ejection fraction was estimated to be 60 %  There were no regional wall motion abnormalities  Wall thickness was mildly increased  RIGHT VENTRICLE:  The ventricle was moderately dilated  Systolic function was mildly reduced  LEFT ATRIUM:  The atrium was mildly dilated  RIGHT ATRIUM:  The atrium was moderately dilated  MITRAL VALVE:  There was mild regurgitation  TRICUSPID VALVE:  There was moderate regurgitation  Estimated peak PA pressure was 52 mmHg  HISTORY: PRIOR HISTORY: CHF, morbid obesity    PROCEDURE: The procedure was performed at the bedside  This was a routine study  The transthoracic approach was used  The study included complete 2D imaging, M-mode, complete spectral Doppler, and color Doppler  The heart rate was 80 bpm,  at the start of the study   Echocardiographic views were limited due to poor patient compliance and poor acoustic window availability  This was a technically difficult study  LEFT VENTRICLE: Size was normal  Systolic function was normal  Ejection fraction was estimated to be 60 %  There were no regional wall motion abnormalities  Wall thickness was mildly increased  DOPPLER: The study was not technically  sufficient to allow evaluation of LV diastolic function  RIGHT VENTRICLE: The ventricle was moderately dilated  Systolic function was mildly reduced  LEFT ATRIUM: The atrium was mildly dilated  RIGHT ATRIUM: The atrium was moderately dilated  MITRAL VALVE: Valve structure was normal  There was normal leaflet separation  DOPPLER: The transmitral velocity was within the normal range  There was no evidence for stenosis  There was mild regurgitation  AORTIC VALVE: The valve was trileaflet  Leaflets exhibited normal thickness and normal cuspal separation  DOPPLER: Transaortic velocity was within the normal range  There was no evidence for stenosis  There was no regurgitation  TRICUSPID VALVE: The valve structure was normal  There was normal leaflet separation  DOPPLER: The transtricuspid velocity was within the normal range  There was no evidence for stenosis  There was moderate regurgitation  Estimated peak PA  pressure was 52 mmHg  PULMONIC VALVE: Leaflets exhibited normal thickness, no calcification, and normal cuspal separation  DOPPLER: The transpulmonic velocity was within the normal range  There was no regurgitation  PERICARDIUM: There was no pericardial effusion  The pericardium was normal in appearance  AORTA: The root exhibited normal size  SYSTEMIC VEINS: IVC: The inferior vena cava was not well visualized      SYSTEM MEASUREMENT TABLES    2D  %FS: 31 45 %  Ao Diam: 3 22 cm  EDV(Teich): 148 86 ml  EF(Teich): 58 74 %  ESV(Teich): 61 42 ml  IVSd: 1 51 cm  LA Area: 20 63 cm2  LA Diam: 5 65 cm  LVIDd: 5 52 cm  LVIDs: 3 79 cm  LVPWd: 1 08 cm  RA Area: 33 86 cm2  RWT: 0 39  SV(Teich): 87 44 ml    CW  RAP: 0 mmHg  TR Vmax: 3 47 m/s  TR maxP 33 mmHg    PW  E' Sept: 0 09 m/s  E/E' Sept: 13 35  MV A Ti: 0 48 m/s  MV Dec [place]: 4 5 m/s2  MV DecT: 253 15 ms  MV E Ti: 1 14 m/s  MV E/A Ratio: 2 36  RVSP: 48 33 mmHg    [address] Accredited Echocardiography [facility]    Prepared and electronically signed by    Lance Torres DO  Signed 2021 08:06:00       No results found for this or any previous visit  No results found for this or any previous visit  No results found for this or any previous visit      Meds/Allergies   all current active meds have been reviewed  Medications Prior to Admission   Medication    carvedilol (COREG) 6 25 mg tablet    digoxin (LANOXIN) 0 25 mg tablet    furosemide (LASIX) 40 mg tablet    metFORMIN (GLUCOPHAGE) 1000 MG tablet    metolazone (ZAROXOLYN) 5 mg tablet    omeprazole (PriLOSEC) 40 MG capsule    warfarin (COUMADIN) 5 mg tablet    zolpidem (AMBIEN) 10 mg tablet       furosemide, 10 mg/hr, Last Rate: 10 mg/hr (21 0530)      Assessment:  Principal Problem:    Acute on chronic diastolic CHF (congestive heart failure) (HCC)  Active Problems:    STEFF (obstructive sleep apnea)    Pleural effusion on right    Cirrhosis of liver with ascites (HCC)    CKD (chronic kidney disease) stage 3, GFR 30-59 ml/min    Hypokalemia    Lactic acidosis    Permanent atrial fibrillation (HCC)    Diverticulosis of colon    Lesion of spleen    Type 2 diabetes mellitus with hyperglycemia, without long-term current use of insulin (HCC)    Acute respiratory failure with hypoxia (HCC)    Tobacco use    Hyponatremia    Constipation    Pulmonary hypertension (HCC)    Vitamin D deficiency    Atelectasis    Anemia

## 2021-03-08 NOTE — ASSESSMENT & PLAN NOTE
· Vitamin B12 level, and folate level WNL  · Iron panel: decreased iron and low normal ferritin, initiated ferrous sulfate PO daily  · Check the patient's stool for occult blood x 3 specimens: negative x 1  · Follow the CBC  · Transfuse for a hemoglobin less than 7 g/dl     Results from last 7 days   Lab Units 03/08/21  0537 03/07/21  0457 03/06/21  0511   WBC Thousand/uL 5 39 5 26 4 92   HEMOGLOBIN g/dL 9 4* 9 3* 9 4*   HEMATOCRIT % 31 8* 31 8* 32 2*   PLATELETS Thousands/uL 193 186 178

## 2021-03-08 NOTE — PHYSICAL THERAPY NOTE
PHYSICAL THERAPY NOTE          Patient Name: Sydney Pineda  SSWIP'N Date: 3/8/2021      03/08/21 1105   Note Type   Note Type Treatment   Restrictions/Precautions   Weight Bearing Precautions Per Order No   Cognition   Overall Cognitive Status WFL   Subjective   Subjective Offered no c/o, would like to ambulate  Transfers   Sit to Stand 6  Modified independent   Additional items Armrests   Stand to Sit 6  Modified independent   Additional items Armrests   Stand pivot 5  Supervision   Ambulation/Elevation   Gait pattern Short stride; Foward flexed   Gait Assistance 5  Supervision   Assistive Device None  (IV pole)   Distance 300'   Balance   Static Sitting Good   Dynamic Sitting Good   Static Standing Fair +   Dynamic Standing Fair +   Ambulatory Fair   Endurance Deficit   Endurance Deficit Yes   Activity Tolerance   Activity Tolerance Patient tolerated treatment well   Assessment   Prognosis Good   Problem List Decreased endurance; Impaired balance;Decreased mobility   Assessment Pt  seen for PT treatment session this date with interventions consisting of  transfers and  gait training w/ emphasis on improving pt's ability to ambulate  Pt  Currently performing  tx and ambulation at (SUP ) x 1 level of function  The patient's AM-PAC Basic Mobility Inpatient Short Form Raw Score is 23, Standardized Score is 50  88  A standardized score greater than 42 9 suggests the patient may benefit from discharge to home  Please also refer to physical therapy recommendation for safe DC planning  In comparison to previous session, Pt  With improvements in activity tolerance  Good tolerance to increased distance  Pt is in need of continued activity in PT to improve strength balance endurance mobility transfers and ambulation with return to maximize LOF   From PT/mobility standpoint, recommendation at time of d/c would be home in order to promote return to PLOF and independence  Goals   LTG Expiration Date 03/09/21   Plan   Treatment/Interventions LE strengthening/ROM; Elevations; Therapeutic exercise; Endurance training;Gait training   Progress Progressing toward goals   Recommendation   PT Discharge Recommendation Return to previous environment with no needs   AM-PAC Basic Mobility Inpatient   Turning in Bed Without Bedrails 4   Lying on Back to Sitting on Edge of Flat Bed 4   Moving Bed to Chair 4   Standing Up From Chair 4   Walk in Room 4   Climb 3-5 Stairs 3   Basic Mobility Inpatient Raw Score 23   Basic Mobility Standardized Score 50 88   Pt  OOB in chair  with call bell within reach, all lines intact  at end of PT session  Discussed with  PT today's treatment and patient's current level of function for care coordination

## 2021-03-08 NOTE — ASSESSMENT & PLAN NOTE
Wt Readings from Last 3 Encounters:   03/08/21 (!) 137 kg (302 lb 4 oz)   01/13/21 (!) 159 kg (351 lb 6 4 oz)   01/12/21 (!) 161 kg (356 lb)     · Troponin levels were within normal limits x 3 sets  · Initiated on a lasix gtt  · Increased lasix gtt @ 20 mg/hr on 02/28/2021, decreased lasix gtt to 10 mg/hr on 03/03/2021 by Cardiology  · Continue the lasix gtt at 10 mg/hr on 03/08/2021  · The patient was treated with metolazone 2 5 mg PO x 1 dose on 02/28/2021  · Continue Coreg 6 25 mg PO BID  · Monitor the patient's renal function closely while receiving the lasix gtt treatment  · Creatinine remains at baseline still with weight loss and signs of volume overload  Will continue with diuresis at same rate  · Continue daily weights, Strict intake/output measurements  · PT/OT  · Cardiology and Nephrology recommendations appreciated

## 2021-03-08 NOTE — ASSESSMENT & PLAN NOTE
· Likely hypervolemic hyponatremia  · Resolved with IV lasix treatment  · Initiated on a lasix gtt  · Increased lasix gtt @ 20 mg/hr on 02/28/2021, decreased lasix gtt to 10 mg/hr on 03/03/2021 by Cardiology  · Continue the lasix gtt at 10 mg/hr on 03/08/2021  · Follow the sodium level      Results from last 7 days   Lab Units 03/08/21  0537 03/07/21  0457 03/06/21  0511   SODIUM mmol/L 139 140 137   POTASSIUM mmol/L 3 6 3 5 3 6   CHLORIDE mmol/L 99* 101 100   CO2 mmol/L 33* 35* 32   BUN mg/dL 27* 28* 27*   CREATININE mg/dL 1 67* 1 57* 1 61*   CALCIUM mg/dL 9 3 9 2 9 3

## 2021-03-08 NOTE — ASSESSMENT & PLAN NOTE
Lab Results   Component Value Date    HGBA1C 6 1 (H) 01/05/2021       Recent Labs     03/07/21  1458 03/07/21  2048 03/08/21  0745 03/08/21  1120   POCGLU 116 122 115 127       Blood Sugar Average: Last 72 hrs:  (P) 074 [id number]     · Recently take off metformin as an outpatient secondary to his liver disease  · Not currently on any diabetic medications as an outpatient  · Consider the initiation of an ACE-inhibitor for renal protection in the setting of type 2 diabetes mellitus if his renal function remains stable  · Insulin sliding scale with blood glucose monitoring ACHS

## 2021-03-09 NOTE — ASSESSMENT & PLAN NOTE
· Continue PO coreg  · The patient developed bradycardia, and the digoxin decreased to every other day dosing by Cardiology on 3/5/21  · The digoxin was discontinued on 03/08/2021 by Cardiology  · Continue anticoagulation with PO coumadin for a goal INR of 2-3  · Outpatient follow-up with Cardiology  Results for Vincent Cerda (MRN [id number]) as of 2/24/2021 12:39   Ref   Range 2/23/2021 05:27   DIGOXIN LEVEL Latest Ref Range: 0 8 - 2 0 ng/mL 1 7

## 2021-03-09 NOTE — PROGRESS NOTES
[address] [facility] [address]  Progress Note - Brittany  1960, 57 y o  male MRN: [id number]  Unit/Bed#: 410-01 Encounter: [id number]  Primary Care Provider: Jensen Mayer DO   Date and time admitted to hospital: 2/22/2021  9:27 AM    * Acute on chronic diastolic CHF (congestive heart failure) ([facility] [address] )  Assessment & Plan  Wt Readings from Last 3 Encounters:   03/09/21 136 kg (299 lb)   01/13/21 (!) 159 kg (351 lb 6 4 oz)   01/12/21 (!) 161 kg (356 lb)     · Troponin levels were within normal limits x 3 sets  · Initiated on a lasix gtt  · Increased lasix gtt @ 20 mg/hr on 02/28/2021, decreased lasix gtt to 10 mg/hr on 03/03/2021 by Cardiology  · Continue the lasix gtt at 10 mg/hr on 03/09/2021  · The patient was treated with metolazone 2 5 mg PO x 1 dose on 02/28/2021  · Continue coreg 6 25 mg PO BID  · Monitor the patient's renal function closely while receiving the lasix gtt treatment  · Creatinine remains at baseline still with weight loss and signs of volume overload  Will continue with diuresis at same rate  · Continue daily weights, Strict intake/output measurements  · PT/OT  · Cardiology and Nephrology recommendations appreciated  Pleural effusion on right  Assessment & Plan  · Recurrent right-sided pleural effusion  · Successful right-sided thoracentesis by Intervention Radiology on 02/23/2021, which yielded 1600ml of pleural fluid  · Initiated on a lasix gtt  · Increased lasix gtt @ 20 mg/hr on 02/28/2021, decreased lasix gtt to 10 mg/hr on 03/03/2021 by Cardiology  · Continue the lasix gtt at 10 mg/hr on 03/09/2021  · Appears to be a transudative pleural effusion by fluid analysis  · The patient was seen in consultation by Pulmonology  · Consider repeat chest xray if worsening shortness of breath or hypoxia      Acute respiratory failure with hypoxia (HCC)  Assessment & Plan  · Initially required 4 lpm of continuous supplemental oxygen via the nasal cannula to maintain oxygen saturations at 92% and above  · Successfully weaned off supplemental oxygen at this time  · Secondary to right-sided pleural effusion  · On the day of discharge, we will need to check an ambulatory pulse oximetry test to see if the patient qualifies for home supplemental oxygen  · Respiratory protocol  · Incentive spirometry 10 times per hour while awake  Cirrhosis of liver with ascites [facility])  Assessment & Plan  · The patient was seen in consultation by Gastroenterology  · Avoid all hepatotoxic agents  · Outpatient hepatocellular carcinoma surveillance and follow-up with Gastroenterology  US liver doppler only  Status: Final result   PACS Images     Show images for US liver doppler only   Study Result    ABDOMEN ULTRASOUND, COMPLETE WITH DOPPLER     INDICATION:    Cirrhosis with ascites        COMPARISON:  Compared with 10/2/2020     TECHNIQUE:   Real-time ultrasound of the abdomen was performed with a curvilinear transducer with Doppler evaluation of liver      FINDINGS:     The main portal vein and primary branch segments are patent and hepatopetal with normal spectral waveform  Peak systolic velocity at 32 cm/s  The right at 42 cm/s and the left at 59 cm/s      Hepatic veins are patent  Spectral waveforms within normal limits  Right hepatic vein at 57 cm/s, mid hepatic vein at 56 cm/s and left hepatic vein at 77 cm/s        Main hepatic artery appears normal size, patent with normal spectral waveform      Splenic vein is patent  56 cm/s      IMPRESSION:     Patent portal vein and hepatic veins           Anemia  Assessment & Plan  · Vitamin B12 level, and folate level WNL  · Iron panel: decreased iron and low normal ferritin, initiated ferrous sulfate PO daily  · Check the patient's stool for occult blood x 3 specimens: negative x 1  · Follow the CBC  · Transfuse for a hemoglobin less than 7 g/dl     Results from last 7 days   Lab Units 03/09/21  0505 03/08/21  0537 03/07/21  0457   WBC Thousand/uL 4 97 5 39 5 26   HEMOGLOBIN g/dL 9 6* 9 4* 9 3*   HEMATOCRIT % 32 4* 31 8* 31 8*   PLATELETS Thousands/uL 182 193 186         Atelectasis  Assessment & Plan  · Incentive spirometry 10 times per hour while awake    Vitamin D deficiency  Assessment & Plan  · Initiated on cholecalciferol 2000 I  U  PO Qdaily  · Recheck a vitamin D 25-OH level in 1-2 months with his PCP  Results for Yareli Machado (MRN [id number]) as of 2/24/2021 12:28   Ref  Range 2/23/2021 05:26   Vit D, 25-Hydroxy Latest Ref Range: 30 0 - 100 0 ng/mL 13 1 (L)           Pulmonary hypertension (HCC)  Assessment & Plan  · Outpatient follow-up with Pulmonology    Constipation  Assessment & Plan  · Now with recurrent constipation  · Give miralax 17 grams PO x 1 dose on 03/09/2021  · Continue colace 100 mg PO BID and dulcolax 10 mg PO Qdaily after dinner    Hyponatremia  Assessment & Plan  · Likely hypervolemic hyponatremia  · Resolved with IV lasix treatment  · Initiated on a lasix gtt  · Increased lasix gtt @ 20 mg/hr on 02/28/2021, decreased lasix gtt to 10 mg/hr on 03/03/2021 by Cardiology  · Continue the lasix gtt at 10 mg/hr on 03/09/2021  · Follow the sodium level  Results from last 7 days   Lab Units 03/09/21  0505 03/08/21  0537 03/07/21  0457   SODIUM mmol/L 139 139 140   POTASSIUM mmol/L 3 4* 3 6 3 5   CHLORIDE mmol/L 100 99* 101   CO2 mmol/L 33* 33* 35*   BUN mg/dL 27* 27* 28*   CREATININE mg/dL 1 60* 1 67* 1 57*   CALCIUM mg/dL 9 4 9 3 9 2         Tobacco use  Assessment & Plan  · Nicotine patch  · Smoking cessation counseling      Type 2 diabetes mellitus with hyperglycemia, without long-term current use of insulin [facility])  Assessment & Plan  Lab Results   Component Value Date    HGBA1C 6 1 (H) 01/05/2021       Recent Labs     03/08/21  1554 03/08/21  2053 03/09/21  0801 03/09/21  1139   POCGLU 103 140 128 119       Blood Sugar Average: Last 72 hrs:  (P) 126 [id number]     · Recently take off metformin as an outpatient secondary to his liver disease  · Not currently on any diabetic medications as an outpatient  · Consider the initiation of an ACE-inhibitor for renal protection in the setting of type 2 diabetes mellitus if his renal function remains stable  · Insulin sliding scale with blood glucose monitoring ACHS  Lesion of spleen  Assessment & Plan  · CT scan of the abdomen/pelvis (02/22/2021): Subcapsular cystic lesion in the spleen (probably related to old trauma)   Outpatient follow-up with PCP in regards to this matter  Diverticulosis of colon  Assessment & Plan  · Outpatient follow-up with Gastroenterology  Permanent atrial fibrillation ([facility] [address] )  Assessment & Plan  · Continue PO coreg  · The patient developed bradycardia, and the digoxin decreased to every other day dosing by Cardiology on 3/5/21  · The digoxin was discontinued on 03/08/2021 by Cardiology  · Continue anticoagulation with PO coumadin for a goal INR of 2-3  · Outpatient follow-up with Cardiology  Results for Malik Voss (MRN [id number]) as of 2/24/2021 12:39   Ref  Range 2/23/2021 05:27   DIGOXIN LEVEL Latest Ref Range: 0 8 - 2 0 ng/mL 1 7       Lactic acidosis  Assessment & Plan  · Resolved with thiamine 200 mg IV x 1 dose on 02/22/2021      Hypokalemia  Assessment & Plan  · Give potassium chloride 40 meQ PO TID x 3 doses on 03/09/2021  · Follow the potassium level and magnesium level    Results from last 7 days   Lab Units 03/09/21  0505 03/08/21  0537 03/07/21  0457   SODIUM mmol/L 139 139 140   POTASSIUM mmol/L 3 4* 3 6 3 5   CHLORIDE mmol/L 100 99* 101   CO2 mmol/L 33* 33* 35*   BUN mg/dL 27* 27* 28*   CREATININE mg/dL 1 60* 1 67* 1 57*   CALCIUM mg/dL 9 4 9 3 9 2         CKD (chronic kidney disease) stage 3, GFR 30-59 ml/min  Assessment & Plan  Lab Results   Component Value Date    EGFR 45 03/09/2021    EGFR 43 03/08/2021    EGFR 47 03/07/2021    CREATININE 1 60 (H) 03/09/2021    CREATININE 1 67 (H) 03/08/2021 CREATININE 1 57 (H) 2021     · Baseline serum creatinine of 1 3-1 6 mg/dl  · Avoid all nephrotoxic agents  · Serial laboratory testing to monitor the patient's renal function and electrolytes  · Check post-void residuals  · The patient was seen in consultation by Nephrology and will need continued outpatient follow-up with Nephrology  STEFF (obstructive sleep apnea)  Assessment & Plan  · Continue BIPAP at 17/11 QHS and anytime while sleeping        VTE Pharmacologic Prophylaxis:   Pharmacologic: Warfarin (Coumadin) for a goal INR of 2-3  Mechanical VTE Prophylaxis in Place: Yes    Patient Centered Rounds: I have performed bedside rounds with nursing staff today  Time Spent for Care: 30 minutes  More than 50% of total time spent on counseling and coordination of care as described above  Current Length of Stay: 15 day(s)    Current Patient Status: Inpatient   Certification Statement: The patient will continue to require additional inpatient hospital stay due to the need for an IV lasix drip/infusion  Code Status: Level 1 - Full Code      Subjective: The patient was seen and examined  The patient is doing better  No chest pain  No shortness of breath  No abdominal pain  No nausea or vomiting  Objective:     Vitals:   Temp (24hrs), Av 4 °F (36 9 °C), Min:97 9 °F (36 6 °C), Max:98 9 °F (37 2 °C)    Temp:  [97 9 °F (36 6 °C)-98 9 °F (37 2 °C)] 97 9 °F (36 6 °C)  HR:  [54-65] 65  Resp:  [18] 18  BP: (101-121)/(50-62) 114/62  SpO2:  [95 %-96 %] 95 %  Body mass index is 41 7 kg/m²  Input and Output Summary (last 24 hours):        Intake/Output Summary (Last 24 hours) at 3/9/2021 1554  Last data filed at 3/9/2021 1150  Gross per 24 hour   Intake 700 ml   Output 1650 ml   Net -950 ml       Physical Exam:     Physical Exam  General:  NAD, follows commands  HEENT:  NC/AT, mucous membranes moist  Neck:  Supple, No JVP elevation  CV:  + S1, + S2, Irregularly irregular rhythm, Intermittent bradycardia  Pulm:  Lung fields are CTA bilaterally  Abd:  Soft, Non-tender, Improving distension  Ext:  No clubbing/cyanosis, Improving edema of the bilateral lower extremities  Skin:  No rashes  Neuro:  Awake, alert, oriented  Psych:  Normal mood and affect      Additional Data:    Labs:    Results from last 7 days   Lab Units 03/09/21  0505   WBC Thousand/uL 4 97   HEMOGLOBIN g/dL 9 6*   HEMATOCRIT % 32 4*   PLATELETS Thousands/uL 182   NEUTROS PCT % 73   LYMPHS PCT % 14   MONOS PCT % 6   EOS PCT % 6     Results from last 7 days   Lab Units 03/09/21  0505   SODIUM mmol/L 139   POTASSIUM mmol/L 3 4*   CHLORIDE mmol/L 100   CO2 mmol/L 33*   BUN mg/dL 27*   CREATININE mg/dL 1 60*   ANION GAP mmol/L 6   CALCIUM mg/dL 9 4   ALBUMIN g/dL 3 0*   TOTAL BILIRUBIN mg/dL 0 70   ALK PHOS U/L 119*   ALT U/L 16   AST U/L 25   GLUCOSE RANDOM mg/dL 125     Results from last 7 days   Lab Units 03/09/21  0505   INR  2 54*     Results from last 7 days   Lab Units 03/09/21  1139 03/09/21  0801 03/08/21  2053 03/08/21  1554 03/08/21  1120 03/08/21  0745 03/07/21  2048 03/07/21  1458 03/07/21  1102 03/07/21  0719 03/06/21  2048 03/06/21  1548   POC GLUCOSE mg/dl 119 128 140 103 127 115 122 116 116 137 140 125                   * I Have Reviewed All Lab Data Listed Above  * Additional Pertinent Lab Tests Reviewed:  [facility]      Recent Cultures (last 7 days):           Last 24 Hours Medication List:   Current Facility-Administered Medications   Medication Dose Route Frequency Provider Last Rate    bisacodyl  10 mg Oral After Jose & Erma, DO      carvedilol  6 25 mg Oral BID With Meals [employer], DO      cholecalciferol  2,000 Units Oral Daily [employer], DO      docusate sodium  100 mg Oral BID [employer], DO      ferrous sulfate  325 mg Oral Daily With Breakfast Isatu Wallace MD      furosemide  10 mg/hr Intravenous Continuous Tia Maury, PA-C 10 mg/hr (03/09/21 0931)    insulin lispro  1-6 Units Subcutaneous HS Irene Gogo, DO      insulin lispro  2-12 Units Subcutaneous TID AC Irene Gogo, DO      nicotine  1 patch Transdermal Daily Irene Search, DO      oxyCODONE  5 mg Oral Q4H PRN Irene Search, DO      pantoprazole  40 mg Oral Early Morning Irene Search, DO      polyvinyl alcohol  2 drop Both Eyes Q3H PRN Irene Search, DO      potassium chloride  40 mEq Oral TID SARA Lawrence, DO      warfarin  5 mg Oral Daily (warfarin) Irene Bowens, DO          Today, Patient Was Seen By: Irene Bowens DO    ** Please Note: Dictation voice to text software may have been used in the creation of this document   **

## 2021-03-09 NOTE — ASSESSMENT & PLAN NOTE
· Now with recurrent constipation  · Give miralax 17 grams PO x 1 dose on 03/09/2021  · Continue colace 100 mg PO BID and dulcolax 10 mg PO Qdaily after dinner

## 2021-03-09 NOTE — ASSESSMENT & PLAN NOTE
· Likely hypervolemic hyponatremia  · Resolved with IV lasix treatment  · Initiated on a lasix gtt  · Increased lasix gtt @ 20 mg/hr on 02/28/2021, decreased lasix gtt to 10 mg/hr on 03/03/2021 by Cardiology  · Continue the lasix gtt at 10 mg/hr on 03/09/2021  · Follow the sodium level      Results from last 7 days   Lab Units 03/09/21  0505 03/08/21  0537 03/07/21  0457   SODIUM mmol/L 139 139 140   POTASSIUM mmol/L 3 4* 3 6 3 5   CHLORIDE mmol/L 100 99* 101   CO2 mmol/L 33* 33* 35*   BUN mg/dL 27* 27* 28*   CREATININE mg/dL 1 60* 1 67* 1 57*   CALCIUM mg/dL 9 4 9 3 9 2

## 2021-03-09 NOTE — PLAN OF CARE
Problem: Potential for Falls  Goal: Patient will remain free of falls  Description: INTERVENTIONS:  - Assess patient frequently for physical needs  -  Identify cognitive and physical deficits and behaviors that affect risk of falls    -  [place] fall precautions as indicated by assessment   - Educate patient/family on patient safety including physical limitations  - Instruct patient to call for assistance with activity based on assessment  - Modify environment to reduce risk of injury  - Consider OT/PT consult to assist with strengthening/mobility  Outcome: Progressing     Problem: CARDIOVASCULAR - ADULT  Goal: Maintains optimal cardiac output and hemodynamic stability  Description: INTERVENTIONS:  - Monitor I/O, vital signs and rhythm  - Monitor for S/S and trends of decreased cardiac output  - Administer and titrate ordered vasoactive medications to optimize hemodynamic stability  - Assess quality of pulses, skin color and temperature  - Assess for signs of decreased coronary artery perfusion  - Instruct patient to report change in severity of symptoms  Outcome: Progressing  Goal: Absence of cardiac dysrhythmias or at baseline rhythm  Description: INTERVENTIONS:  - Continuous cardiac monitoring, vital signs, obtain 12 lead EKG if ordered  - Administer antiarrhythmic and heart rate control medications as ordered  - Monitor electrolytes and administer replacement therapy as ordered  Outcome: Progressing     Problem: RESPIRATORY - ADULT  Goal: Achieves optimal ventilation and oxygenation  Description: INTERVENTIONS:  - Assess for changes in respiratory status  - Assess for changes in mentation and behavior  - Position to facilitate oxygenation and minimize respiratory effort  - Oxygen administered by appropriate delivery if ordered  - Initiate smoking cessation education as indicated  - Encourage broncho-pulmonary hygiene including cough, deep breathe, Incentive Spirometry  - Assess the need for suctioning and aspirate as needed  - Assess and instruct to report SOB or any respiratory difficulty  - Respiratory Therapy support as indicated  Outcome: Progressing     Problem: METABOLIC, FLUID AND ELECTROLYTES - ADULT  Goal: Electrolytes maintained within normal limits  Description: INTERVENTIONS:  - Monitor labs and assess patient for signs and symptoms of electrolyte imbalances  - Administer electrolyte replacement as ordered  - Monitor response to electrolyte replacements, including repeat lab results as appropriate  - Instruct patient on fluid and nutrition as appropriate  Outcome: Progressing  Goal: Fluid balance maintained  Description: INTERVENTIONS:  - Monitor labs   - Monitor I/O and WT  - Instruct patient on fluid and nutrition as appropriate  - Assess for signs & symptoms of volume excess or deficit  Outcome: Progressing  Goal: Glucose maintained within target range  Description: INTERVENTIONS:  - Monitor Blood Glucose as ordered  - Assess for signs and symptoms of hyperglycemia and hypoglycemia  - Administer ordered medications to maintain glucose within target range  - Assess nutritional intake and initiate nutrition service referral as needed  Outcome: Progressing     Problem: SAFETY ADULT  Goal: Patient will remain free of falls  Description: INTERVENTIONS:  - Assess patient frequently for physical needs  -  Identify cognitive and physical deficits and behaviors that affect risk of falls    -  [place] fall precautions as indicated by assessment   - Educate patient/family on patient safety including physical limitations  - Instruct patient to call for assistance with activity based on assessment  - Modify environment to reduce risk of injury  - Consider OT/PT consult to assist with strengthening/mobility  Outcome: Progressing  Goal: Maintain or return to baseline ADL function  Description: INTERVENTIONS:  -  Assess patient's ability to carry out ADLs; assess patient's baseline for ADL function and identify physical deficits which impact ability to perform ADLs (bathing, care of mouth/teeth, toileting, grooming, dressing, etc )  - Assess/evaluate cause of self-care deficits   - Assess range of motion  - Assess patient's mobility; develop plan if impaired  - Assess patient's need for assistive devices and provide as appropriate  - Encourage maximum independence but intervene and supervise when necessary  - Involve family in performance of ADLs  - Assess for home care needs following discharge   - Consider OT consult to assist with ADL evaluation and planning for discharge  - Provide patient education as appropriate  Outcome: Progressing  Goal: Maintain or return mobility status to optimal level  Description: INTERVENTIONS:  - Assess patient's baseline mobility status (ambulation, transfers, stairs, etc )    - Identify cognitive and physical deficits and behaviors that affect mobility  - Identify mobility aids required to assist with transfers and/or ambulation (gait belt, sit-to-stand, lift, walker, cane, etc )  - [place] fall precautions as indicated by assessment  - Record patient progress and toleration of activity level on Mobility SBAR; progress patient to next Phase/Stage  - Instruct patient to call for assistance with activity based on assessment  - Consider rehabilitation consult to assist with strengthening/weightbearing, etc   Outcome: Progressing     Problem: DISCHARGE PLANNING  Goal: Discharge to home or other facility with appropriate resources  Description: INTERVENTIONS:  - Identify barriers to discharge w/patient and caregiver  - Arrange for needed discharge resources and transportation as appropriate  - Identify discharge learning needs (meds, wound care, etc )  - Refer to Case Management Department for coordinating discharge planning if the patient needs post-hospital services based on physician/advanced practitioner order or complex needs related to functional status, cognitive ability, or social support system  Outcome: Progressing     Problem: Knowledge Deficit  Goal: Patient/family/caregiver demonstrates understanding of disease process, treatment plan, medications, and discharge instructions  Description: Complete learning assessment and assess knowledge base  Interventions:  - Provide teaching at level of understanding  - Provide teaching via preferred learning methods  Outcome: Progressing     Problem: Nutrition/Hydration-ADULT  Goal: Nutrient/Hydration intake appropriate for improving, restoring or maintaining nutritional needs  Description: Monitor and assess patient's nutrition/hydration status for malnutrition  Collaborate with interdisciplinary team and initiate plan and interventions as ordered  Monitor patient's weight and dietary intake as ordered or per policy  Utilize nutrition screening tool and intervene as necessary  Determine patient's food preferences and provide high-protein, high-caloric foods as appropriate       INTERVENTIONS:  - Monitor oral intake, urinary output, labs, and treatment plans  - Assess nutrition and hydration status and recommend course of action  - Evaluate amount of meals eaten  - Assist patient with eating if necessary   - Allow adequate time for meals  - Recommend/ encourage appropriate diets, oral nutritional supplements, and vitamin/mineral supplements  - Order, calculate, and assess calorie counts as needed  - Recommend, monitor, and adjust tube feedings and TPN/PPN based on assessed needs  - Assess need for intravenous fluids  - Provide specific nutrition/hydration education as appropriate  - Include patient/family/caregiver in decisions related to nutrition  Outcome: Progressing     Problem: Prexisting or High Potential for Compromised Skin Integrity  Goal: Skin integrity is maintained or improved  Description: INTERVENTIONS:  - Identify patients at risk for skin breakdown  - Assess and monitor skin integrity  - Assess and monitor nutrition and hydration status  - Monitor labs   - Assess for incontinence   - Turn and reposition patient  - Assist with mobility/ambulation  - Relieve pressure over bony prominences  - Avoid friction and shearing  - Provide appropriate hygiene as needed including keeping skin clean and dry  - Evaluate need for skin moisturizer/barrier cream  - Collaborate with interdisciplinary team   - Patient/family teaching  - Consider wound care consult   Outcome: Progressing

## 2021-03-09 NOTE — ASSESSMENT & PLAN NOTE
· Vitamin B12 level, and folate level WNL  · Iron panel: decreased iron and low normal ferritin, initiated ferrous sulfate PO daily  · Check the patient's stool for occult blood x 3 specimens: negative x 1  · Follow the CBC  · Transfuse for a hemoglobin less than 7 g/dl     Results from last 7 days   Lab Units 03/09/21  0505 03/08/21  0537 03/07/21  0457   WBC Thousand/uL 4 97 5 39 5 26   HEMOGLOBIN g/dL 9 6* 9 4* 9 3*   HEMATOCRIT % 32 4* 31 8* 31 8*   PLATELETS Thousands/uL 182 193 186

## 2021-03-09 NOTE — PROGRESS NOTES
Progress Note - Nephrology   John Zeng 57 y o  male MRN: [id number]  Unit/Bed#: 410-01 Encounter: [id number]    A/P:  1  Acute kidney injury on top of chronic kidney disease               creatinine remains stable, continue to avoid potential nephrotoxins and optimize overall care  2  Chronic kidney disease stage 3 with baseline creatinine around 1 3 mg/dL  3  Probable underlying hypertensive nephrosclerosis               blood pressure appropriate, continue current medications  4  Metabolic alkalosis               alkalosis remains stable, continue monitor, patient not currently candidate for or needs to be placed on Diamox  5  Acute on chronic diastolic congestive heart failure               continue Lasix infusion, patient continues to lose fluid, currently the patient is down about 70 lb since admission  6  Hypokalemia               patient to receive a total of 120 mEq over the course of the day, this is appropriate, continue closely monitor potassium and check magnesium from time to time        Follow up reason for today's visit:  Acute kidney injury/chronic kidney disease/alkalosis    Acute on chronic diastolic CHF (congestive heart failure) ([facility] )    Patient Active Problem List   Diagnosis    Atrial fibrillation ([facility] )    Chronic diastolic congestive heart failure ([facility] )    Diabetes mellitus, type 2 (HCC)    Class 3 severe obesity due to excess calories with serious comorbidity and body mass index (BMI) of 45 0 to 49 9 in adult [facility])    Lymphedema    Long term (current) use of anticoagulants    Chronic venous insufficiency    Varicose veins of both lower extremities with inflammation    Hypertension    STEFF (obstructive sleep apnea)    Pleural effusion on right    SOB (shortness of breath)    Cirrhosis of liver with ascites (HCC)    CKD (chronic kidney disease) stage 3, GFR 30-59 ml/min    Hyperkalemia    Tobacco abuse    Chronic anemia    Thrombocytopenia (HCC)    Hypokalemia    Other cirrhosis of liver ([facility] )    Encounter for screening for other viral diseases     Lactic acidosis    Permanent atrial fibrillation (HCC)    Diverticulosis of colon    Lesion of spleen    Type 2 diabetes mellitus with hyperglycemia, without long-term current use of insulin (HCC)    Acute on chronic diastolic CHF (congestive heart failure) (HCC)    Acute respiratory failure with hypoxia (HCC)    Tobacco use    Hyponatremia    Constipation    Pulmonary hypertension (HCC)    Vitamin D deficiency    Atelectasis    Anemia         Subjective:   No acute events overnight    Objective:     Vitals: Blood pressure 114/62, pulse 65, temperature 97 9 °F (36 6 °C), temperature source Oral, resp  rate 18, height 5' 11" (1 803 m), weight 136 kg (299 lb), SpO2 95 %  ,Body mass index is 41 7 kg/m²  Weight (last 2 days)     Date/Time   Weight    03/09/21 0600   136 (299)    03/08/21 0600   (!) 137 (302 25)    03/07/21 0600   (!) 139 (306)                Intake/Output Summary (Last 24 hours) at 3/9/2021 1023  Last data filed at 3/9/2021 0650  Gross per 24 hour   Intake 420 ml   Output 2100 ml   Net -1680 ml     I/O last 3 completed shifts:   In: 1440 [P O :1440]  Out: 4050 [Urine:4050]         Physical Exam: /62 (BP Location: Right arm)   Pulse 65   Temp 97 9 °F (36 6 °C) (Oral)   Resp 18   Ht 5' 11" (1 803 m)   Wt 136 kg (299 lb)   SpO2 95%   BMI 41 70 kg/m²     General Appearance:    Alert, cooperative, no distress, appears stated age   Head:    Normocephalic, without obvious abnormality, atraumatic   Eyes:    Conjunctiva/corneas clear   Ears:    Normal external ears   Nose:   Nares normal, septum midline, mucosa normal, no drainage    or sinus tenderness   Throat:   Lips, mucosa, and tongue normal; teeth and gums normal   Neck:   Supple   Back:     Symmetric, no curvature, ROM normal, no CVA tenderness   Lungs:     Reduced bilaterally but otherwise clear   Chest wall:    No tenderness or deformity Heart:    Regular rate and rhythm, S1 and S2 normal, no murmur, rub   or gallop   Abdomen:     Soft, non-tender, bowel sounds active   Extremities:   Extremities normal, atraumatic, no cyanosis, +3 bilateral lower extremity edema   Skin:   Skin color, texture, turgor normal, no rashes or lesions   Lymph nodes:   Cervical normal   Neurologic:   CNII-XII intact            Lab, Imaging and other studies: I have personally reviewed pertinent labs  CBC:   Lab Results   Component Value Date    WBC 4 97 03/09/2021    HGB 9 6 (L) 03/09/2021    HCT 32 4 (L) 03/09/2021    MCV 90 03/09/2021     03/09/2021    MCH 26 7 (L) 03/09/2021    MCHC 29 6 (L) 03/09/2021    RDW 16 6 (H) 03/09/2021    MPV 11 0 03/09/2021    NRBC 0 03/09/2021     CMP:   Lab Results   Component Value Date    K 3 4 (L) 03/09/2021     03/09/2021    CO2 33 (H) 03/09/2021    BUN 27 (H) 03/09/2021    CREATININE 1 60 (H) 03/09/2021    CALCIUM 9 4 03/09/2021    AST 25 03/09/2021    ALT 16 03/09/2021    ALKPHOS 119 (H) 03/09/2021    EGFR 45 03/09/2021         Results from last 7 days   Lab Units 03/09/21  0505 03/08/21  0537 03/07/21  0457  03/03/21  0512   POTASSIUM mmol/L 3 4* 3 6 3 5   < > 3 7   CHLORIDE mmol/L 100 99* 101   < > 97*   CO2 mmol/L 33* 33* 35*   < > 35*   BUN mg/dL 27* 27* 28*   < > 27*   CREATININE mg/dL 1 60* 1 67* 1 57*   < > 1 79*   CALCIUM mg/dL 9 4 9 3 9 2   < > 9 2   ALK PHOS U/L 119*  --   --   --  136*   ALT U/L 16  --   --   --  21   AST U/L 25  --   --   --  21    < > = values in this interval not displayed           Phosphorus:   Lab Results   Component Value Date    PHOS 4 0 03/09/2021     Magnesium:   Lab Results   Component Value Date    MG 2 2 03/09/2021     Urinalysis: No results found for: Toshia Overcast, SPECGRAV, PHUR, LEUKOCYTESUR, NITRITE, PROTEINUA, GLUCOSEU, KETONESU, BILIRUBINUR, BLOODU  Ionized Calcium: No results found for: LIBAN  Coagulation:   Lab Results   Component Value Date    INR 2 54 (H) 03/09/2021     Troponin: No results found for: TROPONINI  ABG: No results found for: PHART, JPE4EOQ, PO2ART, BSD7OJX, V0YAEUUN, BEART, SOURCE  Radiology review:     IMAGING  No results found      Current Facility-Administered Medications   Medication Dose Route Frequency    bisacodyl (DULCOLAX) EC tablet 10 mg  10 mg Oral After Dinner    carvedilol (COREG) tablet 6 25 mg  6 25 mg Oral BID With Meals    cholecalciferol (VITAMIN D3) tablet 2,000 Units  2,000 Units Oral Daily    docusate sodium (COLACE) capsule 100 mg  100 mg Oral BID    ferrous sulfate tablet 325 mg  325 mg Oral Daily With Breakfast    furosemide (LASIX) 500 mg infusion 50 mL  10 mg/hr Intravenous Continuous    insulin lispro (HumaLOG) 100 units/mL subcutaneous injection 1-6 Units  1-6 Units Subcutaneous HS    insulin lispro (HumaLOG) 100 units/mL subcutaneous injection 2-12 Units  2-12 Units Subcutaneous TID AC    nicotine (NICODERM CQ) 7 mg/24hr TD 24 hr patch 1 patch  1 patch Transdermal Daily    oxyCODONE (ROXICODONE) IR tablet 5 mg  5 mg Oral Q4H PRN    pantoprazole (PROTONIX) EC tablet 40 mg  40 mg Oral Early Morning    polyvinyl alcohol (LIQUIFILM TEARS) 1 4 % ophthalmic solution 2 drop  2 drop Both Eyes Q3H PRN    potassium chloride (K-DUR,KLOR-CON) CR tablet 40 mEq  40 mEq Oral TID AC    warfarin (COUMADIN) tablet 5 mg  5 mg Oral Daily (warfarin)     Medications Discontinued During This Encounter   Medication Reason    acetaminophen (TYLENOL) tablet 650 mg     furosemide (LASIX) injection 40 mg     potassium chloride (K-DUR,KLOR-CON) CR tablet 20 mEq     potassium chloride (K-DUR,KLOR-CON) CR tablet 20 mEq     potassium chloride (K-DUR,KLOR-CON) CR tablet 20 mEq     digoxin (LANOXIN) tablet 250 mcg     potassium chloride (K-DUR,KLOR-CON) CR tablet 20 mEq     digoxin (LANOXIN) tablet 250 mcg     potassium chloride (K-DUR,KLOR-CON) CR tablet 20 mEq     potassium chloride (K-DUR,KLOR-CON) CR tablet 40 mEq        Bud Jerson, DO      This progress note was produced in part using a dictation device which may document imprecise wording from author's original intent

## 2021-03-09 NOTE — PHYSICAL THERAPY NOTE
PHYSICAL THERAPY NOTE          Patient Name: Ranjana Nunez  DGBNU'G Date: 3/9/2021     03/09/21 0930   Note Type   Note Type Treatment   Pain Assessment   Pain Score No Pain   Cognition   Overall Cognitive Status WFL   Subjective   Subjective Pt would like to ambulate  Reports he has been doing his exercises  Transfers   Sit to Stand 6  Modified independent   Additional items Armrests   Stand to Sit 5  Supervision   Ambulation/Elevation   Gait pattern Excessively slow; Short stride;Decreased foot clearance   Gait Assistance 5  Supervision   Assistive Device None  (iv pole)   Distance 300'   Balance   Static Sitting Good   Dynamic Sitting Good   Static Standing Fair +   Dynamic Standing Fair +   Ambulatory Fair +   Endurance Deficit   Endurance Deficit Yes   Activity Tolerance   Activity Tolerance Patient tolerated treatment well   Assessment   Prognosis Good   Problem List Decreased endurance;Decreased mobility; Impaired balance   Assessment Pt  seen for PT treatment session this date with interventions consisting of  gait training w/ emphasis on improving pt's ability to ambulate  Pt  Currently performing  tx and ambulation at (mod I-SUP ) x 1 level of function  Utilizing RW with fair balance and stability  The patient's AM-PAC Basic Mobility Inpatient Short Form Raw Score is 24, Standardized Score is 57 68  A standardized score greater than 42 9 suggests the patient may benefit from discharge to home  Please also refer to physical therapy recommendation for safe DC planning  In comparison to previous session, Pt  With improvements in activity tolerance  Decreased gait speed with no episodes LOB or SOB  Pt is in need of continued activity in PT to improve strength balance endurance mobility transfers and ambulation with return to maximize LOF   From PT/mobility standpoint, recommendation at time of d/c would be home with no rehab needs  in order to promote return to PLOF and independence  Goals   LTG Expiration Date 03/09/21   Plan   Treatment/Interventions Functional transfer training;LE strengthening/ROM; Elevations; Therapeutic exercise; Endurance training;Gait training   Progress Progressing toward goals   Recommendation   PT Discharge Recommendation Return to previous environment with no needs   AM-PAC Basic Mobility Inpatient   Turning in Bed Without Bedrails 4   Lying on Back to Sitting on Edge of Flat Bed 4   Moving Bed to Chair 4   Standing Up From Chair 4   Walk in Room 4   Climb 3-5 Stairs 4   Basic Mobility Inpatient Raw Score 24   Basic Mobility Standardized Score 57 68   Pt  OOB in chair  with call bell within reach  at end of PT session  Discussed with  PT today's treatment and patient's current level of function for care coordination

## 2021-03-09 NOTE — ASSESSMENT & PLAN NOTE
· Recurrent right-sided pleural effusion  · Successful right-sided thoracentesis by Intervention Radiology on 02/23/2021, which yielded 1600ml of pleural fluid  · Initiated on a lasix gtt  · Increased lasix gtt @ 20 mg/hr on 02/28/2021, decreased lasix gtt to 10 mg/hr on 03/03/2021 by Cardiology  · Continue the lasix gtt at 10 mg/hr on 03/09/2021  · Appears to be a transudative pleural effusion by fluid analysis  · The patient was seen in consultation by Pulmonology  · Consider repeat chest xray if worsening shortness of breath or hypoxia

## 2021-03-09 NOTE — ASSESSMENT & PLAN NOTE
· Initiated on cholecalciferol 2000 I  U  PO Qdaily  · Recheck a vitamin D 25-OH level in 1-2 months with his PCP  Results for Jeanie Barber (MRN [id number]) as of 2/24/2021 12:28   Ref   Range 2/23/2021 05:26   Vit D, 25-Hydroxy Latest Ref Range: 30 0 - 100 0 ng/mL 13 1 (L)

## 2021-03-09 NOTE — PLAN OF CARE
Problem: PHYSICAL THERAPY ADULT  Goal: Performs mobility at highest level of function for planned discharge setting  See evaluation for individualized goals  Description: Treatment/Interventions: LE strengthening/ROM, Functional transfer training, Elevations, Therapeutic exercise, Endurance training, Gait training, Bed mobility          See flowsheet documentation for full assessment, interventions and recommendations  Outcome: Progressing  Note: Prognosis: Good  Problem List: Decreased endurance, Decreased mobility, Impaired balance  Assessment: Pt  seen for PT treatment session this date with interventions consisting of  gait training w/ emphasis on improving pt's ability to ambulate  Pt  Currently performing  tx and ambulation at (mod I-SUP ) x 1 level of function  Utilizing RW with fair balance and stability  The patient's AM-PAC Basic Mobility Inpatient Short Form Raw Score is 24, Standardized Score is 57 68  A standardized score greater than 42 9 suggests the patient may benefit from discharge to home  Please also refer to physical therapy recommendation for safe DC planning  In comparison to previous session, Pt  With improvements in activity tolerance  Decreased gait speed with no episodes LOB or SOB  Pt is in need of continued activity in PT to improve strength balance endurance mobility transfers and ambulation with return to maximize LOF  From PT/mobility standpoint, recommendation at time of d/c would be home with no rehab needs  in order to promote return to PLOF and independence  PT Discharge Recommendation: Return to previous environment with no needs          See flowsheet documentation for full assessment

## 2021-03-09 NOTE — PROGRESS NOTES
Cardiology Progress Note - Rupesh Zamora 57 y o  male MRN: [id number]    Unit/Bed#: 410-01 Encounter: [id number]      Assessment/Plan:  1  Acute on chronic diastolic congestive heart failure              - still continues to diurese well on lasix gtt              - weight down another 3 lbs since yesterday at 299 lbs today - overall patient has lost 70 lbs since admission although he still appears volume overloaded on exam, ? How much edema is chronic   - patient expresses today that he would like to go home by this Thursday as he works Saturday-Monday              - would continue lasix gtt at 10 mg/hr for another 24 hours, will transition to PO diuretics tomorrow  - continue daily weights, low sodium diet, fluid restriction, strict I/O              - monitor daily BMP   - he has f/u scheduled with Dr Kathreen Landau on 3/18      2  Chronic atrial fibrillation              - digoxin discontinued yesterday due to bradycardia on telemetry   - heart rates today appear to be stable              - continue carvedilol              - continue anticoagulation with coumadin for goal INR 2-3     3  Hypertension               - controlled on current regimen    Subjective:   Patient seen and examined  Offers no complaints  He is starting to desire to go home  Review of Systems   Constitution: Negative for chills and fever  HENT: Negative  Cardiovascular: Positive for dyspnea on exertion and leg swelling  Negative for chest pain, near-syncope, orthopnea, palpitations, paroxysmal nocturnal dyspnea and syncope  Respiratory: Negative for cough and shortness of breath  Gastrointestinal: Negative for diarrhea, nausea and vomiting  Genitourinary: Negative  Neurological: Positive for headaches  Negative for dizziness and light-headedness  All other systems reviewed and are negative  Objective:   Vitals: Blood pressure 114/62, pulse 65, temperature 97 9 °F (36 6 °C), temperature source Oral, resp   rate 18, height 5' 11" (1 803 m), weight 136 kg (299 lb), SpO2 95 %  , Body mass index is 41 7 kg/m² ,   Orthostatic Blood Pressures      Most Recent Value   Blood Pressure  114/62 filed at 2021 0805   Patient Position - Orthostatic VS  Sitting filed at 2021 1384         Systolic (22BWY), CZ , Min:101 , HY     Diastolic (62BME), EJV:25, Min:50, Max:62      Intake/Output Summary (Last 24 hours) at 3/9/2021 0959  Last data filed at 3/9/2021 0650  Gross per 24 hour   Intake 420 ml   Output 2650 ml   Net -2230 ml     Weight (last 2 days)     Date/Time   Weight    21 0600   136 (299)    21 0600   (!) 137 (302 25)    21 0600   (!) 139 (306)                Telemetry Review: not on telemetry  EKG personally reviewed by Jimmie Guzmán PA-C  Physical Exam  Vitals signs reviewed  Constitutional:       General: He is not in acute distress  Appearance: He is well-developed  He is obese  He is not diaphoretic  HENT:      Head: Normocephalic and atraumatic  Eyes:      Pupils: Pupils are equal, round, and reactive to light  Neck:      Musculoskeletal: Normal range of motion  Vascular: No carotid bruit  Cardiovascular:      Rate and Rhythm: Normal rate  Rhythm irregularly irregular  Pulses:           Radial pulses are 2+ on the right side and 2+ on the left side  Heart sounds: S1 normal and S2 normal  No murmur  Pulmonary:      Effort: Pulmonary effort is normal  No respiratory distress  Breath sounds: Normal breath sounds  No wheezing or rales  Abdominal:      General: There is no distension  Palpations: Abdomen is soft  Tenderness: There is no abdominal tenderness  Musculoskeletal: Normal range of motion  Right lower leg: Edema (+2 bilateral lower extremity and pedal edema) present  Left lower leg: Edema present  Skin:     General: Skin is warm and dry  Findings: No erythema        Comments: Venous stasis changes in bilateral lower extremities   Neurological:      General: No focal deficit present  Mental Status: He is alert and oriented to person, place, and time     Psychiatric:         Mood and Affect: Mood normal          Behavior: Behavior normal            Laboratory Results:        CBC with diff:   Results from last 7 days   Lab Units 03/09/21  0505 03/08/21  0537 03/07/21  0457 03/06/21  0511 03/05/21  0434 03/04/21  0613 03/03/21  0512   WBC Thousand/uL 4 97 5 39 5 26 4 92 4 98 4 76 5 43   HEMOGLOBIN g/dL 9 6* 9 4* 9 3* 9 4* 9 6* 9 3* 9 6*   HEMATOCRIT % 32 4* 31 8* 31 8* 32 2* 32 6* 31 2* 32 0*   MCV fL 90 90 91 92 91 90 90   PLATELETS Thousands/uL 182 193 186 178 201 197 188   MCH pg 26 7* 26 7* 26 6* 26 7* 26 8 26 9 27 0   MCHC g/dL 29 6* 29 6* 29 2* 29 2* 29 4* 29 8* 30 0*   RDW % 16 6* 16 8* 16 7* 16 6* 16 6* 16 5* 16 5*   MPV fL 11 0 11 3 11 6 11 4 11 1 10 5 10 6   NRBC AUTO /100 WBCs 0 0 0 0 0 0 0         CMP:  Results from last 7 days   Lab Units 03/09/21  0505 03/08/21  0537 03/07/21  0457 03/06/21  0511 03/05/21  0434 03/04/21  0613 03/03/21  0512   POTASSIUM mmol/L 3 4* 3 6 3 5 3 6 3 8 3 4* 3 7   CHLORIDE mmol/L 100 99* 101 100 98* 99* 97*   CO2 mmol/L 33* 33* 35* 32 35* 35* 35*   BUN mg/dL 27* 27* 28* 27* 25 26* 27*   CREATININE mg/dL 1 60* 1 67* 1 57* 1 61* 1 65* 1 59* 1 79*   CALCIUM mg/dL 9 4 9 3 9 2 9 3 9 4 9 4 9 2   AST U/L 25  --   --   --   --   --  21   ALT U/L 16  --   --   --   --   --  21   ALK PHOS U/L 119*  --   --   --   --   --  136*   EGFR ml/min/1 73sq m 45 43 47 45 44 46 40         BMP:  Results from last 7 days   Lab Units 03/09/21  0505 03/08/21  0537 03/07/21  0457 03/06/21  0511 03/05/21  0434 03/04/21  0613 03/03/21  0512   POTASSIUM mmol/L 3 4* 3 6 3 5 3 6 3 8 3 4* 3 7   CHLORIDE mmol/L 100 99* 101 100 98* 99* 97*   CO2 mmol/L 33* 33* 35* 32 35* 35* 35*   BUN mg/dL 27* 27* 28* 27* 25 26* 27*   CREATININE mg/dL 1 60* 1 67* 1 57* 1 61* 1 65* 1 59* 1 79*   CALCIUM mg/dL 9 4 9 3 9 2 9 3 9 4 9 4 9 2 BNP: No results for input(s): BNP in the last 72 hours  Magnesium:   Results from last 7 days   Lab Units 21  0505 21  0511 21  0512   MAGNESIUM mg/dL 2 2 2 3 2 1       Coags:   Results from last 7 days   Lab Units 21  0505 21  0537 21  0511 21  0434 21  0512   INR  2 54* 2 34* 2 41* 2 38* 2 36*       TSH:        Hemoglobin A1C       Lipid Profile:       Cardiac testing:   Results for orders placed during the hospital encounter of 21   Echo complete with contrast if indicated    Narrative [address]  [address], [place] [address]  [phone]    Transthoracic Echocardiogram  2D, M-mode, Doppler, and Color Doppler    Study date:  2021    Patient: Maykel Mcgrath  MR number: [id number]  Account number: [de-identified]  : 1960  Age: 57 years  Gender: Male  Status: Inpatient  Location: Bedside  Height: 71 in  Weight: 350 lb  BP: 130/ 80 mmHg    Indications: shortness of breath    Diagnoses: 428 0 - CONGESTIVE HEART FAILURE    Sonographer:  [address], CCT  Primary Physician:  Nela Salmon DO  Referring Physician:  Gerard Baez DO  Group:  [facility]  Interpreting Physician:  Colt Ryan DO    SUMMARY    LEFT VENTRICLE:  Systolic function was normal  Ejection fraction was estimated to be 60 %  There were no regional wall motion abnormalities  Wall thickness was mildly increased  RIGHT VENTRICLE:  The ventricle was moderately dilated  Systolic function was mildly reduced  LEFT ATRIUM:  The atrium was mildly dilated  RIGHT ATRIUM:  The atrium was moderately dilated  MITRAL VALVE:  There was mild regurgitation  TRICUSPID VALVE:  There was moderate regurgitation  Estimated peak PA pressure was 52 mmHg  HISTORY: PRIOR HISTORY: CHF, morbid obesity    PROCEDURE: The procedure was performed at the bedside  This was a routine study   The transthoracic approach was used  The study included complete 2D imaging, M-mode, complete spectral Doppler, and color Doppler  The heart rate was 80 bpm,  at the start of the study  Echocardiographic views were limited due to poor patient compliance and poor acoustic window availability  This was a technically difficult study  LEFT VENTRICLE: Size was normal  Systolic function was normal  Ejection fraction was estimated to be 60 %  There were no regional wall motion abnormalities  Wall thickness was mildly increased  DOPPLER: The study was not technically  sufficient to allow evaluation of LV diastolic function  RIGHT VENTRICLE: The ventricle was moderately dilated  Systolic function was mildly reduced  LEFT ATRIUM: The atrium was mildly dilated  RIGHT ATRIUM: The atrium was moderately dilated  MITRAL VALVE: Valve structure was normal  There was normal leaflet separation  DOPPLER: The transmitral velocity was within the normal range  There was no evidence for stenosis  There was mild regurgitation  AORTIC VALVE: The valve was trileaflet  Leaflets exhibited normal thickness and normal cuspal separation  DOPPLER: Transaortic velocity was within the normal range  There was no evidence for stenosis  There was no regurgitation  TRICUSPID VALVE: The valve structure was normal  There was normal leaflet separation  DOPPLER: The transtricuspid velocity was within the normal range  There was no evidence for stenosis  There was moderate regurgitation  Estimated peak PA  pressure was 52 mmHg  PULMONIC VALVE: Leaflets exhibited normal thickness, no calcification, and normal cuspal separation  DOPPLER: The transpulmonic velocity was within the normal range  There was no regurgitation  PERICARDIUM: There was no pericardial effusion  The pericardium was normal in appearance  AORTA: The root exhibited normal size  SYSTEMIC VEINS: IVC: The inferior vena cava was not well visualized      SYSTEM MEASUREMENT TABLES    2D  %FS: 31 45 %  Ao Diam: 3 22 cm  EDV(Teich): 148 86 ml  EF(Teich): 58 74 %  ESV(Teich): 61 42 ml  IVSd: 1 51 cm  LA Area: 20 63 cm2  LA Diam: 5 65 cm  LVIDd: 5 52 cm  LVIDs: 3 79 cm  LVPWd: 1 08 cm  RA Area: 33 86 cm2  RWT: 0 39  SV(Teich): 87 44 ml    CW  RAP: 0 mmHg  TR Vmax: 3 47 m/s  TR maxP 33 mmHg    PW  E' Sept: 0 09 m/s  E/E' Sept: 13 35  MV A Ti: 0 48 m/s  MV Dec [place]: 4 5 m/s2  MV DecT: 253 15 ms  MV E Ti: 1 14 m/s  MV E/A Ratio: 2 36  RVSP: 48 33 mmHg    [facility]    Prepared and electronically signed by    Myriam Marcelo DO  Signed 2021 08:06:00       No results found for this or any previous visit  No results found for this or any previous visit  No results found for this or any previous visit      Meds/Allergies   all current active meds have been reviewed  Medications Prior to Admission   Medication    carvedilol (COREG) 6 25 mg tablet    digoxin (LANOXIN) 0 25 mg tablet    furosemide (LASIX) 40 mg tablet    metFORMIN (GLUCOPHAGE) 1000 MG tablet    metolazone (ZAROXOLYN) 5 mg tablet    omeprazole (PriLOSEC) 40 MG capsule    warfarin (COUMADIN) 5 mg tablet    zolpidem (AMBIEN) 10 mg tablet       furosemide, 10 mg/hr, Last Rate: 10 mg/hr (21)      Assessment:  Principal Problem:    Acute on chronic diastolic CHF (congestive heart failure) (HCC)  Active Problems:    STEFF (obstructive sleep apnea)    Pleural effusion on right    Cirrhosis of liver with ascites (HCC)    CKD (chronic kidney disease) stage 3, GFR 30-59 ml/min    Hypokalemia    Lactic acidosis    Permanent atrial fibrillation (HCC)    Diverticulosis of colon    Lesion of spleen    Type 2 diabetes mellitus with hyperglycemia, without long-term current use of insulin (HCC)    Acute respiratory failure with hypoxia (HCC)    Tobacco use    Hyponatremia    Constipation    Pulmonary hypertension (HCC)    Vitamin D deficiency Atelectasis    Anemia

## 2021-03-09 NOTE — ASSESSMENT & PLAN NOTE
· Give potassium chloride 40 meQ PO TID x 3 doses on 03/09/2021  · Follow the potassium level and magnesium level    Results from last 7 days   Lab Units 03/09/21  0505 03/08/21  0537 03/07/21  0457   SODIUM mmol/L 139 139 140   POTASSIUM mmol/L 3 4* 3 6 3 5   CHLORIDE mmol/L 100 99* 101   CO2 mmol/L 33* 33* 35*   BUN mg/dL 27* 27* 28*   CREATININE mg/dL 1 60* 1 67* 1 57*   CALCIUM mg/dL 9 4 9 3 9 2

## 2021-03-09 NOTE — ASSESSMENT & PLAN NOTE
Wt Readings from Last 3 Encounters:   03/09/21 136 kg (299 lb)   01/13/21 (!) 159 kg (351 lb 6 4 oz)   01/12/21 (!) 161 kg (356 lb)     · Troponin levels were within normal limits x 3 sets  · Initiated on a lasix gtt  · Increased lasix gtt @ 20 mg/hr on 02/28/2021, decreased lasix gtt to 10 mg/hr on 03/03/2021 by Cardiology  · Continue the lasix gtt at 10 mg/hr on 03/09/2021  · The patient was treated with metolazone 2 5 mg PO x 1 dose on 02/28/2021  · Continue coreg 6 25 mg PO BID  · Monitor the patient's renal function closely while receiving the lasix gtt treatment  · Creatinine remains at baseline still with weight loss and signs of volume overload  Will continue with diuresis at same rate  · Continue daily weights, Strict intake/output measurements  · PT/OT  · Cardiology and Nephrology recommendations appreciated

## 2021-03-09 NOTE — ASSESSMENT & PLAN NOTE
Lab Results   Component Value Date    EGFR 45 03/09/2021    EGFR 43 03/08/2021    EGFR 47 03/07/2021    CREATININE 1 60 (H) 03/09/2021    CREATININE 1 67 (H) 03/08/2021    CREATININE 1 57 (H) 03/07/2021     · Baseline serum creatinine of 1 3-1 6 mg/dl  · Avoid all nephrotoxic agents  · Serial laboratory testing to monitor the patient's renal function and electrolytes  · Check post-void residuals  · The patient was seen in consultation by Nephrology and will need continued outpatient follow-up with Nephrology

## 2021-03-09 NOTE — ASSESSMENT & PLAN NOTE
Lab Results   Component Value Date    HGBA1C 6 1 (H) 01/05/2021       Recent Labs     03/08/21  1554 03/08/21 2053 03/09/21  0801 03/09/21  1139   POCGLU 103 140 128 119       Blood Sugar Average: Last 72 hrs:  (P) 126 [id number]     · Recently take off metformin as an outpatient secondary to his liver disease  · Not currently on any diabetic medications as an outpatient  · Consider the initiation of an ACE-inhibitor for renal protection in the setting of type 2 diabetes mellitus if his renal function remains stable  · Insulin sliding scale with blood glucose monitoring ACHS

## 2021-03-10 PROBLEM — E83.52 HYPERCALCEMIA: Status: ACTIVE | Noted: 2021-01-01

## 2021-03-10 NOTE — CASE MANAGEMENT
As per physician during inner disciplinary discharge planning meeting patient's lasix drip was discontinued  Pt will likely be ready for discharge in the am  Pt would like to go to cardiac rehab

## 2021-03-10 NOTE — ASSESSMENT & PLAN NOTE
· Likely hypervolemic hyponatremia  · Resolved with IV lasix treatment  · Initiated on a lasix gtt  · Increased lasix gtt @ 20 mg/hr on 02/28/2021, decreased lasix gtt to 10 mg/hr on 03/03/2021 by Cardiology  · Discontinue the lasix gtt on 03/10/2021  · Transition to lasix 80 mg PO BID with plans for discharge on 03/11/2021  · Follow the sodium level      Results from last 7 days   Lab Units 03/10/21  0432 03/09/21  0505 03/08/21  0537   SODIUM mmol/L 139 139 139   POTASSIUM mmol/L 3 4* 3 4* 3 6   CHLORIDE mmol/L 100 100 99*   CO2 mmol/L 31 33* 33*   BUN mg/dL 28* 27* 27*   CREATININE mg/dL 1 61* 1 60* 1 67*   CALCIUM mg/dL 9 4 9 4 9 3

## 2021-03-10 NOTE — ASSESSMENT & PLAN NOTE
· Initiated on cholecalciferol 2000 I  U  PO Qdaily  · Recheck a vitamin D 25-OH level in 1-2 months with his PCP  Results for Yuniel Johnson (MRN [id number]) as of 2/24/2021 12:28   Ref   Range 2/23/2021 05:26   Vit D, 25-Hydroxy Latest Ref Range: 30 0 - 100 0 ng/mL 13 1 (L)

## 2021-03-10 NOTE — ASSESSMENT & PLAN NOTE
· Vitamin B12 level, and folate level WNL  · Iron panel: decreased iron and low normal ferritin, initiated ferrous sulfate PO daily  · Check the patient's stool for occult blood x 3 specimens: negative x 1  · Follow the CBC  · Transfuse for a hemoglobin less than 7 g/dl     Results from last 7 days   Lab Units 03/10/21  0432 03/09/21  0505 03/08/21  0537   WBC Thousand/uL 4 91 4 97 5 39   HEMOGLOBIN g/dL 9 2* 9 6* 9 4*   HEMATOCRIT % 31 1* 32 4* 31 8*   PLATELETS Thousands/uL 181 182 193

## 2021-03-10 NOTE — ASSESSMENT & PLAN NOTE
Lab Results   Component Value Date    HGBA1C 6 1 (H) 01/05/2021       Recent Labs     03/09/21  1610 03/09/21  2118 03/10/21  0722 03/10/21  1110   POCGLU 112 137 114 122       Blood Sugar Average: Last 72 hrs:  (P) 122     · Recently take off metformin as an outpatient secondary to his liver disease  · Not currently on any diabetic medications as an outpatient  · Consider the initiation of an ACE-inhibitor for renal protection in the setting of type 2 diabetes mellitus if his renal function remains stable  · Insulin sliding scale with blood glucose monitoring ACHS

## 2021-03-10 NOTE — ASSESSMENT & PLAN NOTE
Lab Results   Component Value Date    EGFR 45 03/10/2021    EGFR 45 03/09/2021    EGFR 43 03/08/2021    CREATININE 1 61 (H) 03/10/2021    CREATININE 1 60 (H) 03/09/2021    CREATININE 1 67 (H) 03/08/2021     · Baseline serum creatinine of 1 3-1 6 mg/dl  · Avoid all nephrotoxic agents  · Serial laboratory testing to monitor the patient's renal function and electrolytes  · Check post-void residuals  · The patient was seen in consultation by Nephrology and will need continued outpatient follow-up with Nephrology

## 2021-03-10 NOTE — PROGRESS NOTES
[facility] [address] [place]  Progress Note - Caroline Expose 1960, 57 y o  male MRN: [id number]  Unit/Bed#: 410-01 Encounter: [id number]  Primary Care Provider: Arsh Patel DO   Date and time admitted to hospital: 2/22/2021  9:27 AM    * Acute on chronic diastolic CHF (congestive heart failure) ([facility] [address] )  Assessment & Plan  Wt Readings from Last 3 Encounters:   03/10/21 136 kg (298 lb 15 1 oz)   01/13/21 (!) 159 kg (351 lb 6 4 oz)   01/12/21 (!) 161 kg (356 lb)     · Troponin levels were within normal limits x 3 sets  · Initiated on a lasix gtt  · Increased lasix gtt @ 20 mg/hr on 02/28/2021, decreased lasix gtt to 10 mg/hr on 03/03/2021 by Cardiology  · The patient was treated with metolazone 2 5 mg PO x 1 dose on 02/28/2021  · Discontinue the lasix gtt on 03/10/2021  · Transition to lasix 80 mg PO BID with plans for discharge on 03/11/2021  · Continue coreg 6 25 mg PO BID  · Monitor the patient's renal function closely while receiving the lasix gtt treatment  · Creatinine remains at baseline still with weight loss and signs of volume overload  Will continue with diuresis at same rate  · Continue daily weights, Strict intake/output measurements  · PT/OT  · Cardiology and Nephrology recommendations appreciated  Pleural effusion on right  Assessment & Plan  · Recurrent right-sided pleural effusion  · Successful right-sided thoracentesis by [facility]vention Radiology on 02/23/2021, which yielded 1600ml of pleural fluid  · Initiated on a lasix gtt  · Increased lasix gtt @ 20 mg/hr on 02/28/2021, decreased lasix gtt to 10 mg/hr on 03/03/2021 by Cardiology  · Discontinue the lasix gtt on 03/10/2021  · Transition to lasix 80 mg PO BID with plans for discharge on 03/11/2021  · Appears to be a transudative pleural effusion by fluid analysis  · The patient was seen in consultation by Pulmonology  · Consider repeat chest xray if worsening shortness of breath or hypoxia      Acute respiratory failure with hypoxia (HCC)  Assessment & Plan  · Initially required 4 lpm of continuous supplemental oxygen via the nasal cannula to maintain oxygen saturations at 92% and above  · Successfully weaned off supplemental oxygen at this time  · Secondary to right-sided pleural effusion  · On the day of discharge, we will need to check an ambulatory pulse oximetry test to see if the patient qualifies for home supplemental oxygen  · Respiratory protocol  · Incentive spirometry 10 times per hour while awake  Cirrhosis of liver with ascites [facility])  Assessment & Plan  · The patient was seen in consultation by Gastroenterology  · Avoid all hepatotoxic agents  · Outpatient hepatocellular carcinoma surveillance and follow-up with Gastroenterology  US liver doppler only  Status: Final result   PACS Images     Show images for US liver doppler only   Study Result    ABDOMEN ULTRASOUND, COMPLETE WITH DOPPLER     INDICATION:    Cirrhosis with ascites        COMPARISON:  Compared with 10/2/2020     TECHNIQUE:   Real-time ultrasound of the abdomen was performed with a curvilinear transducer with Doppler evaluation of liver      FINDINGS:     The main portal vein and primary branch segments are patent and hepatopetal with normal spectral waveform  Peak systolic velocity at 32 cm/s  The right at 42 cm/s and the left at 59 cm/s      Hepatic veins are patent  Spectral waveforms within normal limits  Right hepatic vein at 57 cm/s, mid hepatic vein at 56 cm/s and left hepatic vein at 77 cm/s        Main hepatic artery appears normal size, patent with normal spectral waveform      Splenic vein is patent  56 cm/s      IMPRESSION:     Patent portal vein and hepatic veins           Hypercalcemia  Assessment & Plan  · Check an ionized calcium level  · Check an intact-PTH level    Anemia  Assessment & Plan  · Vitamin B12 level, and folate level WNL  · Iron panel: decreased iron and low normal ferritin, initiated ferrous sulfate PO daily  · Check the patient's stool for occult blood x 3 specimens: negative x 1  · Follow the CBC  · Transfuse for a hemoglobin less than 7 g/dl     Results from last 7 days   Lab Units 03/10/21  0432 03/09/21  0505 03/08/21  0537   WBC Thousand/uL 4 91 4 97 5 39   HEMOGLOBIN g/dL 9 2* 9 6* 9 4*   HEMATOCRIT % 31 1* 32 4* 31 8*   PLATELETS Thousands/uL 181 182 193         Atelectasis  Assessment & Plan  · Incentive spirometry 10 times per hour while awake    Vitamin D deficiency  Assessment & Plan  · Initiated on cholecalciferol 2000 I  U  PO Qdaily  · Recheck a vitamin D 25-OH level in 1-2 months with his PCP  Results for Leslie Peoples (MRN [id number]) as of 2/24/2021 12:28   Ref  Range 2/23/2021 05:26   Vit D, 25-Hydroxy Latest Ref Range: 30 0 - 100 0 ng/mL 13 1 (L)           Pulmonary hypertension (HCC)  Assessment & Plan  · Outpatient follow-up with Pulmonology    Constipation  Assessment & Plan  · Now with recurrent constipation  · Received miralax 17 grams PO x 1 dose on 03/09/2021  · Continue colace 100 mg PO BID and dulcolax 10 mg PO Qdaily after dinner    Hyponatremia  Assessment & Plan  · Likely hypervolemic hyponatremia  · Resolved with IV lasix treatment  · Initiated on a lasix gtt  · Increased lasix gtt @ 20 mg/hr on 02/28/2021, decreased lasix gtt to 10 mg/hr on 03/03/2021 by Cardiology  · Discontinue the lasix gtt on 03/10/2021  · Transition to lasix 80 mg PO BID with plans for discharge on 03/11/2021  · Follow the sodium level  Results from last 7 days   Lab Units 03/10/21  0432 03/09/21  0505 03/08/21  0537   SODIUM mmol/L 139 139 139   POTASSIUM mmol/L 3 4* 3 4* 3 6   CHLORIDE mmol/L 100 100 99*   CO2 mmol/L 31 33* 33*   BUN mg/dL 28* 27* 27*   CREATININE mg/dL 1 61* 1 60* 1 67*   CALCIUM mg/dL 9 4 9 4 9 3         Tobacco use  Assessment & Plan  · Nicotine patch  · Smoking cessation counseling      Type 2 diabetes mellitus with hyperglycemia, without long-term current use of insulin [facility])  Assessment & Plan  Lab Results   Component Value Date    HGBA1C 6 1 (H) 01/05/2021       Recent Labs     03/09/21  1610 03/09/21  2118 03/10/21  0722 03/10/21  1110   POCGLU 112 137 114 122       Blood Sugar Average: Last 72 hrs:  (P) 122     · Recently take off metformin as an outpatient secondary to his liver disease  · Not currently on any diabetic medications as an outpatient  · Consider the initiation of an ACE-inhibitor for renal protection in the setting of type 2 diabetes mellitus if his renal function remains stable  · Insulin sliding scale with blood glucose monitoring ACHS  Lesion of spleen  Assessment & Plan  · CT scan of the abdomen/pelvis (02/22/2021): Subcapsular cystic lesion in the spleen (probably related to old trauma)   Outpatient follow-up with PCP in regards to this matter  Diverticulosis of colon  Assessment & Plan  · Outpatient follow-up with Gastroenterology  Permanent atrial fibrillation ([facility] [address] )  Assessment & Plan  · Continue PO coreg  · The patient developed bradycardia, and the digoxin decreased to every other day dosing by Cardiology on 3/5/21  · The digoxin was discontinued on 03/08/2021 by Cardiology  · Continue anticoagulation with PO coumadin for a goal INR of 2-3  · Outpatient follow-up with Cardiology  Results for Jeanie Barber (MRN [id number]) as of 2/24/2021 12:39   Ref  Range 2/23/2021 05:27   DIGOXIN LEVEL Latest Ref Range: 0 8 - 2 0 ng/mL 1 7       Lactic acidosis  Assessment & Plan  · Resolved with thiamine 200 mg IV x 1 dose on 02/22/2021      Hypokalemia  Assessment & Plan  · Give potassium chloride 40 meQ PO TID x 3 doses on 03/10/2021  · Follow the potassium level and magnesium level    Results from last 7 days   Lab Units 03/10/21  0432 03/09/21  0505 03/08/21  0537   SODIUM mmol/L 139 139 139   POTASSIUM mmol/L 3 4* 3 4* 3 6   CHLORIDE mmol/L 100 100 99*   CO2 mmol/L 31 33* 33*   BUN mg/dL 28* 27* 27*   CREATININE mg/dL 1 61* 1 60* 1 67* CALCIUM mg/dL 9 4 9 4 9 3         CKD (chronic kidney disease) stage 3, GFR 30-59 ml/min  Assessment & Plan  Lab Results   Component Value Date    EGFR 45 03/10/2021    EGFR 45 2021    EGFR 43 2021    CREATININE 1 61 (H) 03/10/2021    CREATININE 1 60 (H) 2021    CREATININE 1 67 (H) 2021     · Baseline serum creatinine of 1 3-1 6 mg/dl  · Avoid all nephrotoxic agents  · Serial laboratory testing to monitor the patient's renal function and electrolytes  · Check post-void residuals  · The patient was seen in consultation by Nephrology and will need continued outpatient follow-up with Nephrology  STEFF (obstructive sleep apnea)  Assessment & Plan  · Continue BIPAP at 17/11 QHS and anytime while sleeping         VTE Pharmacologic Prophylaxis:   Pharmacologic: Warfarin (Coumadin) for a goal INR of 2-3  Mechanical VTE Prophylaxis in Place: Yes    Patient Centered Rounds: I have performed bedside rounds with nursing staff today  Discussions with Specialists or Other Care Team Provider: I discussed the case with Dr Sarbjit Billy (Cardiology) and with Dr Raiza Andersen (Nephrology)  Time Spent for Care: 30 minutes  More than 50% of total time spent on counseling and coordination of care as described above  Current Length of Stay: 16 day(s)    Current Patient Status: Inpatient   Certification Statement: The patient will continue to require additional inpatient hospital stay due to the need to transition the patient off the IV lasix drip/infusion to PO lasix  Code Status: Level 1 - Full Code      Subjective: The patient was seen and examined  The patient is doing better  No chest pain  No shortness of breath  No abdominal pain  No nausea or vomiting        Objective:     Vitals:   Temp (24hrs), Av 1 °F (36 7 °C), Min:98 °F (36 7 °C), Max:98 2 °F (36 8 °C)    Temp:  [98 °F (36 7 °C)-98 2 °F (36 8 °C)] 98 °F (36 7 °C)  HR:  [57-66] 61  Resp:  [16-20] 16  BP: ()/(54-98) 122/67  SpO2:  [97 %-99 %] 98 %  Body mass index is 41 69 kg/m²  Input and Output Summary (last 24 hours): Intake/Output Summary (Last 24 hours) at 3/10/2021 1518  Last data filed at 3/10/2021 1233  Gross per 24 hour   Intake 1050 ml   Output 2600 ml   Net -1550 ml       Physical Exam:     Physical Exam  General:  NAD, follows commands  HEENT:  NC/AT, mucous membranes moist  Neck:  Supple, No JVP elevation  CV:  + S1, + S2, Irregularly irregular rhythm, Intermittent bradycardia  Pulm:  Lung fields are CTA bilaterally  Abd:  Soft, Non-tender, Improving abdominal distension  Ext:  No clubbing/cyanosis, Improving edema of the bilateral lower extremities  Skin:  No rashes  Neuro:  Awake, alert, oriented  Psych:  Normal mood and affect      Additional Data:    Labs:    Results from last 7 days   Lab Units 03/10/21  0432   WBC Thousand/uL 4 91   HEMOGLOBIN g/dL 9 2*   HEMATOCRIT % 31 1*   PLATELETS Thousands/uL 181   NEUTROS PCT % 71   LYMPHS PCT % 14   MONOS PCT % 7   EOS PCT % 7*     Results from last 7 days   Lab Units 03/10/21  0432   SODIUM mmol/L 139   POTASSIUM mmol/L 3 4*   CHLORIDE mmol/L 100   CO2 mmol/L 31   BUN mg/dL 28*   CREATININE mg/dL 1 61*   ANION GAP mmol/L 8   CALCIUM mg/dL 9 4   ALBUMIN g/dL 2 9*   TOTAL BILIRUBIN mg/dL 0 60   ALK PHOS U/L 130*   ALT U/L 24   AST U/L 25   GLUCOSE RANDOM mg/dL 162*     Results from last 7 days   Lab Units 03/10/21  0432   INR  2 60*     Results from last 7 days   Lab Units 03/10/21  1110 03/10/21  0722 03/09/21  2118 03/09/21  1610 03/09/21  1139 03/09/21  0801 03/08/21  2053 03/08/21  1554 03/08/21  1120 03/08/21  0745 03/07/21  2048 03/07/21  1458   POC GLUCOSE mg/dl 122 114 137 112 119 128 140 103 127 115 122 116                   * I Have Reviewed All Lab Data Listed Above        Recent Cultures (last 7 days):           Last 24 Hours Medication List:   Current Facility-Administered Medications   Medication Dose Route Frequency Provider Last Rate    bisacodyl  10 mg Oral After Jose Ritchie, DO      carvedilol  6 25 mg Oral BID With Meals Wilian Buck DO      cholecalciferol  2,000 Units Oral Daily Wilian Buck DO      docusate sodium  100 mg Oral BID Wilian Buck DO      ferrous sulfate  325 mg Oral Daily With Breakfast Isatu Isaac MD      furosemide  80 mg Oral BID Sydna Bosworth, DO      insulin lispro  1-6 Units Subcutaneous HS Wilian Buck DO      insulin lispro  2-12 Units Subcutaneous TID AC Wilian Buck DO      nicotine  1 patch Transdermal Daily Wilian Buck DO      oxyCODONE  5 mg Oral Q4H PRN Wilian Buck DO      pantoprazole  40 mg Oral Early Morning Wilian Buck DO      polyvinyl alcohol  2 drop Both Eyes Q3H PRN Wilian Buck DO      potassium chloride  40 mEq Oral TID With Meals Wilian Buck DO      warfarin  5 mg Oral Daily (warfarin) Wilian Buck DO          Today, Patient Was Seen By: Wilian Buck DO    ** Please Note: Dictation voice to text software may have been used in the creation of this document   **

## 2021-03-10 NOTE — ASSESSMENT & PLAN NOTE
· Now with recurrent constipation  · Received miralax 17 grams PO x 1 dose on 03/09/2021  · Continue colace 100 mg PO BID and dulcolax 10 mg PO Qdaily after dinner

## 2021-03-10 NOTE — ASSESSMENT & PLAN NOTE
· Recurrent right-sided pleural effusion  · Successful right-sided thoracentesis by Intervention Radiology on 02/23/2021, which yielded 1600ml of pleural fluid  · Initiated on a lasix gtt  · Increased lasix gtt @ 20 mg/hr on 02/28/2021, decreased lasix gtt to 10 mg/hr on 03/03/2021 by Cardiology  · Discontinue the lasix gtt on 03/10/2021  · Transition to lasix 80 mg PO BID with plans for discharge on 03/11/2021  · Appears to be a transudative pleural effusion by fluid analysis  · The patient was seen in consultation by Pulmonology  · Consider repeat chest xray if worsening shortness of breath or hypoxia

## 2021-03-10 NOTE — ASSESSMENT & PLAN NOTE
Wt Readings from Last 3 Encounters:   03/10/21 136 kg (298 lb 15 1 oz)   01/13/21 (!) 159 kg (351 lb 6 4 oz)   01/12/21 (!) 161 kg (356 lb)     · Troponin levels were within normal limits x 3 sets  · Initiated on a lasix gtt  · Increased lasix gtt @ 20 mg/hr on 02/28/2021, decreased lasix gtt to 10 mg/hr on 03/03/2021 by Cardiology  · The patient was treated with metolazone 2 5 mg PO x 1 dose on 02/28/2021  · Discontinue the lasix gtt on 03/10/2021  · Transition to lasix 80 mg PO BID with plans for discharge on 03/11/2021  · Continue coreg 6 25 mg PO BID  · Monitor the patient's renal function closely while receiving the lasix gtt treatment  · Creatinine remains at baseline still with weight loss and signs of volume overload  Will continue with diuresis at same rate  · Continue daily weights, Strict intake/output measurements  · PT/OT  · Cardiology and Nephrology recommendations appreciated

## 2021-03-10 NOTE — PROGRESS NOTES
Cardiology Progress Note - Ane Median 57 y o  male MRN: [id number]    Unit/Bed#: 410-01 Encounter: [id number]    Assessment/Plan:  1  Acute on chronic diastolic congestive heart failure              - has diuresed >70 lbs since admission 2 5 weeks ago   - weight down to 298 lbs today, creatinine stable, volume status overall improved although continues to have edema   - will transition from lasix gtt to lasix 80 mg BID today in hopes of discharge tomorrow   - discussed use of metolazone 5 mg prn for weight gain >3lbs in 1 night or >5lbs in 1 week   - continue daily weights, low sodium diet, fluid restriction, strict I/O   - f/u on BMP tomorrow   - has f/u with Dr Jonathan Page on 3/18     2  Chronic atrial fibrillation              - heart rates are controlled on carvedilol   - digoxin discontinued given bradycardia (HR's in the 30's overnight)              - continue anticoagulation with coumadin for goal INR 2-3     3  Hypertension               - controlled on current regimen    Subjective:   Patient seen and examined  He feels well today  Denies shortness of breath and chest pain  Review of Systems   Constitution: Negative for chills and fever  HENT: Negative  Cardiovascular: Positive for leg swelling  Negative for chest pain, dyspnea on exertion, near-syncope, orthopnea, palpitations, paroxysmal nocturnal dyspnea and syncope  Respiratory: Negative for cough and shortness of breath  Gastrointestinal: Negative for diarrhea, nausea and vomiting  Genitourinary: Negative  Neurological: Positive for headaches  Negative for dizziness and light-headedness  All other systems reviewed and are negative  Objective:   Vitals: Blood pressure 122/67, pulse 61, temperature 98 °F (36 7 °C), temperature source Oral, resp  rate 16, height 5' 11" (1 803 m), weight 136 kg (298 lb 15 1 oz), SpO2 98 %  , Body mass index is 41 69 kg/m² ,   Orthostatic Blood Pressures      Most Recent Value   Blood Pressure 122/67 filed at 03/10/2021 7578   Patient Position - Orthostatic VS  Sitting filed at 03/10/2021 1587         Systolic (66WEK), DEK:583 , Min:98 , NHR:563     Diastolic (46JNZ), ZCW:91, Min:54, Max:98      Intake/Output Summary (Last 24 hours) at 3/10/2021 1104  Last data filed at 3/10/2021 0930  Gross per 24 hour   Intake 1020 ml   Output 2100 ml   Net -1080 ml     Weight (last 2 days)     Date/Time   Weight    03/10/21 0600   136 (298 94)    03/09/21 0600   136 (299)    03/08/21 0600   (!) 137 (302 25)                Telemetry Review: patient not on telemetry  EKG personally reviewed by Elena Day PA-C  Physical Exam  Vitals signs reviewed  Constitutional:       General: He is not in acute distress  Appearance: He is well-developed  He is obese  He is not diaphoretic  HENT:      Head: Normocephalic and atraumatic  Eyes:      Pupils: Pupils are equal, round, and reactive to light  Neck:      Musculoskeletal: Normal range of motion  Vascular: No carotid bruit  Cardiovascular:      Rate and Rhythm: Normal rate  Rhythm irregularly irregular  Pulses:           Radial pulses are 2+ on the right side and 2+ on the left side  Heart sounds: S1 normal and S2 normal  No murmur  Pulmonary:      Effort: Pulmonary effort is normal  No respiratory distress  Breath sounds: Examination of the right-lower field reveals decreased breath sounds  Decreased breath sounds present  No wheezing or rales  Abdominal:      General: There is no distension  Palpations: Abdomen is soft  Tenderness: There is no abdominal tenderness  Musculoskeletal: Normal range of motion  Right lower leg: Edema (+2 bilateral lower extremity edema) present  Left lower leg: Edema present  Skin:     General: Skin is warm and dry  Findings: No erythema  Neurological:      General: No focal deficit present  Mental Status: He is alert and oriented to person, place, and time     Psychiatric: Mood and Affect: Mood normal          Behavior: Behavior normal            Laboratory Results:        CBC with diff:   Results from last 7 days   Lab Units 03/10/21  0432 03/09/21  0505 03/08/21  0537 03/07/21  0457 03/06/21  0511 03/05/21  0434 03/04/21  0613   WBC Thousand/uL 4 91 4 97 5 39 5 26 4 92 4 98 4 76   HEMOGLOBIN g/dL 9 2* 9 6* 9 4* 9 3* 9 4* 9 6* 9 3*   HEMATOCRIT % 31 1* 32 4* 31 8* 31 8* 32 2* 32 6* 31 2*   MCV fL 91 90 90 91 92 91 90   PLATELETS Thousands/uL 181 182 193 186 178 201 197   MCH pg 26 8 26 7* 26 7* 26 6* 26 7* 26 8 26 9   MCHC g/dL 29 6* 29 6* 29 6* 29 2* 29 2* 29 4* 29 8*   RDW % 16 8* 16 6* 16 8* 16 7* 16 6* 16 6* 16 5*   MPV fL 11 8 11 0 11 3 11 6 11 4 11 1 10 5   NRBC AUTO /100 WBCs 0 0 0 0 0 0 0         CMP:  Results from last 7 days   Lab Units 03/10/21  0432 03/09/21  0505 03/08/21  0537 03/07/21  0457 03/06/21  0511 03/05/21  0434 03/04/21  0613   POTASSIUM mmol/L 3 4* 3 4* 3 6 3 5 3 6 3 8 3 4*   CHLORIDE mmol/L 100 100 99* 101 100 98* 99*   CO2 mmol/L 31 33* 33* 35* 32 35* 35*   BUN mg/dL 28* 27* 27* 28* 27* 25 26*   CREATININE mg/dL 1 61* 1 60* 1 67* 1 57* 1 61* 1 65* 1 59*   CALCIUM mg/dL 9 4 9 4 9 3 9 2 9 3 9 4 9 4   AST U/L 25 25  --   --   --   --   --    ALT U/L 24 16  --   --   --   --   --    ALK PHOS U/L 130* 119*  --   --   --   --   --    EGFR ml/min/1 73sq m 45 45 43 47 45 44 46         BMP:  Results from last 7 days   Lab Units 03/10/21  0432 03/09/21  0505 03/08/21  0537 03/07/21  0457 03/06/21  0511 03/05/21  0434 03/04/21  0613   POTASSIUM mmol/L 3 4* 3 4* 3 6 3 5 3 6 3 8 3 4*   CHLORIDE mmol/L 100 100 99* 101 100 98* 99*   CO2 mmol/L 31 33* 33* 35* 32 35* 35*   BUN mg/dL 28* 27* 27* 28* 27* 25 26*   CREATININE mg/dL 1 61* 1 60* 1 67* 1 57* 1 61* 1 65* 1 59*   CALCIUM mg/dL 9 4 9 4 9 3 9 2 9 3 9 4 9 4       BNP: No results for input(s): BNP in the last 72 hours      Magnesium:   Results from last 7 days   Lab Units 03/10/21  0432 03/09/21  0505 21  0511   MAGNESIUM mg/dL 2 3 2 2 2 3       Coags:   Results from last 7 days   Lab Units 03/10/21  0432 21  0505 21  0537 21  0511 21  0434   INR  2 60* 2 54* 2 34* 2 41* 2 38*       TSH:        Hemoglobin A1C       Lipid Profile:       Cardiac testing:   Results for orders placed during the hospital encounter of 21   Echo complete with contrast if indicated    Narrative [address] [facility] [address] [facility] [address] [place] [address]  [phone]    Transthoracic Echocardiogram  2D, M-mode, Doppler, and Color Doppler    Study date:  2021    Patient: Dai Wiseman  MR number: [id number]  Account number: [de-identified]  : 1960  Age: 57 years  Gender: Male  Status: Inpatient  Location: Bedside  Height: 71 in  Weight: 350 lb  BP: 130/ 80 mmHg    Indications: shortness of breath    Diagnoses: 428 0 - CONGESTIVE HEART FAILURE    Sonographer:  Uriel Montelongo, CCT  Primary Physician:  Luca Segovia DO  Referring Physician:  Charlene Mcgill DO  Group:  [facility]  Interpreting Physician:  Nasreen Hernandez DO    SUMMARY    LEFT VENTRICLE:  Systolic function was normal  Ejection fraction was estimated to be 60 %  There were no regional wall motion abnormalities  Wall thickness was mildly increased  RIGHT VENTRICLE:  The ventricle was moderately dilated  Systolic function was mildly reduced  LEFT ATRIUM:  The atrium was mildly dilated  RIGHT ATRIUM:  The atrium was moderately dilated  MITRAL VALVE:  There was mild regurgitation  TRICUSPID VALVE:  There was moderate regurgitation  Estimated peak PA pressure was 52 mmHg  HISTORY: PRIOR HISTORY: CHF, morbid obesity    PROCEDURE: The procedure was performed at the bedside  This was a routine study  The transthoracic approach was used  The study included complete 2D imaging, M-mode, complete spectral Doppler, and color Doppler   The heart rate was 80 bpm,  at the start of the study  Echocardiographic views were limited due to poor patient compliance and poor acoustic window availability  This was a technically difficult study  LEFT VENTRICLE: Size was normal  Systolic function was normal  Ejection fraction was estimated to be 60 %  There were no regional wall motion abnormalities  Wall thickness was mildly increased  DOPPLER: The study was not technically  sufficient to allow evaluation of LV diastolic function  RIGHT VENTRICLE: The ventricle was moderately dilated  Systolic function was mildly reduced  LEFT ATRIUM: The atrium was mildly dilated  RIGHT ATRIUM: The atrium was moderately dilated  MITRAL VALVE: Valve structure was normal  There was normal leaflet separation  DOPPLER: The transmitral velocity was within the normal range  There was no evidence for stenosis  There was mild regurgitation  AORTIC VALVE: The valve was trileaflet  Leaflets exhibited normal thickness and normal cuspal separation  DOPPLER: Transaortic velocity was within the normal range  There was no evidence for stenosis  There was no regurgitation  TRICUSPID VALVE: The valve structure was normal  There was normal leaflet separation  DOPPLER: The transtricuspid velocity was within the normal range  There was no evidence for stenosis  There was moderate regurgitation  Estimated peak PA  pressure was 52 mmHg  PULMONIC VALVE: Leaflets exhibited normal thickness, no calcification, and normal cuspal separation  DOPPLER: The transpulmonic velocity was within the normal range  There was no regurgitation  PERICARDIUM: There was no pericardial effusion  The pericardium was normal in appearance  AORTA: The root exhibited normal size  SYSTEMIC VEINS: IVC: The inferior vena cava was not well visualized      SYSTEM MEASUREMENT TABLES    2D  %FS: 31 45 %  Ao Diam: 3 22 cm  EDV(Teich): 148 86 ml  EF(Teich): 58 74 %  ESV(Teich): 61 42 ml  IVSd: 1 51 cm  LA Area: 20 63 cm2  LA Diam: 5 65 cm  LVIDd: 5 52 cm  LVIDs: 3 79 cm  LVPWd: 1 08 cm  RA Area: 33 86 cm2  RWT: 0 39  SV(Teich): 87 44 ml    CW  RAP: 0 mmHg  TR Vmax: 3 47 m/s  TR maxP 33 mmHg    PW  E' Sept: 0 09 m/s  E/E' Sept: 13 35  MV A Ti: 0 48 m/s  MV Dec [place]: 4 5 m/s2  MV DecT: 253 15 ms  MV E Ti: 1 14 m/s  MV E/A Ratio: 2 36  RVSP: 48 33 mmHg    [facility]    Prepared and electronically signed by    Dell Roy, DO  Signed 2021 08:06:00       No results found for this or any previous visit  No results found for this or any previous visit  No results found for this or any previous visit      Meds/Allergies   all current active meds have been reviewed  Medications Prior to Admission   Medication    carvedilol (COREG) 6 25 mg tablet    digoxin (LANOXIN) 0 25 mg tablet    furosemide (LASIX) 40 mg tablet    metFORMIN (GLUCOPHAGE) 1000 MG tablet    metolazone (ZAROXOLYN) 5 mg tablet    omeprazole (PriLOSEC) 40 MG capsule    warfarin (COUMADIN) 5 mg tablet    zolpidem (AMBIEN) 10 mg tablet          Assessment:  Principal Problem:    Acute on chronic diastolic CHF (congestive heart failure) (HCC)  Active Problems:    STEFF (obstructive sleep apnea)    Pleural effusion on right    Cirrhosis of liver with ascites (HCC)    CKD (chronic kidney disease) stage 3, GFR 30-59 ml/min    Hypokalemia    Lactic acidosis    Permanent atrial fibrillation (HCC)    Diverticulosis of colon    Lesion of spleen    Type 2 diabetes mellitus with hyperglycemia, without long-term current use of insulin (HCC)    Acute respiratory failure with hypoxia (HCC)    Tobacco use    Hyponatremia    Constipation    Pulmonary hypertension (HCC)    Vitamin D deficiency    Atelectasis    Anemia

## 2021-03-10 NOTE — ASSESSMENT & PLAN NOTE
· Continue PO coreg  · The patient developed bradycardia, and the digoxin decreased to every other day dosing by Cardiology on 3/5/21  · The digoxin was discontinued on 03/08/2021 by Cardiology  · Continue anticoagulation with PO coumadin for a goal INR of 2-3  · Outpatient follow-up with Cardiology  Results for Raiza Sharp (MRN [id number]) as of 2/24/2021 12:39   Ref   Range 2/23/2021 05:27   DIGOXIN LEVEL Latest Ref Range: 0 8 - 2 0 ng/mL 1 7

## 2021-03-10 NOTE — ASSESSMENT & PLAN NOTE
· Give potassium chloride 40 meQ PO TID x 3 doses on 03/10/2021  · Follow the potassium level and magnesium level    Results from last 7 days   Lab Units 03/10/21  0432 03/09/21  0505 03/08/21  0537   SODIUM mmol/L 139 139 139   POTASSIUM mmol/L 3 4* 3 4* 3 6   CHLORIDE mmol/L 100 100 99*   CO2 mmol/L 31 33* 33*   BUN mg/dL 28* 27* 27*   CREATININE mg/dL 1 61* 1 60* 1 67*   CALCIUM mg/dL 9 4 9 4 9 3

## 2021-03-10 NOTE — PROGRESS NOTES
Progress Note - Nephrology   Tiffanie Cervantes 57 y o  male MRN: [id number]  Unit/Bed#: 410-01 Encounter: [id number]    A/P:  1  Acute kidney injury on top of chronic kidney disease                kidney function stable, continue current medications including aggressive diuresis which is not been transition to oral medications  2  Chronic kidney disease stage 3 with baseline creatinine around 1 3 mg/dL  3  Probable underlying hypertensive nephrosclerosis                pressure stable, continue current medications  4  Metabolic alkalosis                serum bicarbonate slightly lower this morning, continue monitor, is currently 31 millimole/L   5  Acute on chronic diastolic congestive heart failure              Patient has been transitioned Lasix drip to oral Lasix  Discussed dosing with Cardiology and we felt that 80 mg twice a day overall Lasix is good from the cardiology standpoint as well as from the Nephrology standpoint  Patient is also agreeable with the higher dosing of the Lasix  6  Hypokalemia                continue high-dose supplementation, patient will receive 120 mEq of potassium chloride today        Follow up reason for today's visit:  Acute kidney injury/chronic kidney disease/alkalosis    Acute on chronic diastolic CHF (congestive heart failure) ([facility] )    Patient Active Problem List   Diagnosis    Atrial fibrillation ([facility] )    [facility]    Diabetes mellitus, type 2 (HCC)    Class 3 severe obesity due to excess calories with serious comorbidity and body mass index (BMI) of 45 0 to 49 9 in adult [facility])    Lymphedema    Long term (current) use of anticoagulants    Chronic venous insufficiency    Varicose veins of both lower extremities with inflammation    Hypertension    STEFF (obstructive sleep apnea)    Pleural effusion on right    SOB (shortness of breath)    Cirrhosis of liver with ascites (HCC)    CKD (chronic kidney disease) stage 3, GFR 30-59 ml/min    Hyperkalemia    Tobacco abuse    Chronic anemia    Thrombocytopenia (HCC)    Hypokalemia    Other cirrhosis of liver ([facility] )    Encounter for screening for other viral diseases     Lactic acidosis    Permanent atrial fibrillation (HCC)    Diverticulosis of colon    Lesion of spleen    Type 2 diabetes mellitus with hyperglycemia, without long-term current use of insulin (HCC)    Acute on chronic diastolic CHF (congestive heart failure) (HCC)    Acute respiratory failure with hypoxia (HCC)    Tobacco use    Hyponatremia    Constipation    Pulmonary hypertension (HCC)    Vitamin D deficiency    Atelectasis    Anemia         Subjective:   No Acute events overnight, patient is eating and drinking appropriately    Objective:     Vitals: Blood pressure 122/67, pulse 61, temperature 98 °F (36 7 °C), temperature source Oral, resp  rate 16, height 5' 11" (1 803 m), weight 136 kg (298 lb 15 1 oz), SpO2 98 %  ,Body mass index is 41 69 kg/m²  Weight (last 2 days)     Date/Time   Weight    03/10/21 0600   136 (298 94)    03/09/21 0600   136 (299)    03/08/21 0600   (!) 137 (302 25)                Intake/Output Summary (Last 24 hours) at 3/10/2021 1039  Last data filed at 3/10/2021 0930  Gross per 24 hour   Intake 1120 ml   Output 2100 ml   Net -980 ml     I/O last 3 completed shifts:   In: 1120 [P O :1120]  Out: 3350 [Urine:3350]         Physical Exam: /67 (BP Location: Right arm)   Pulse 61   Temp 98 °F (36 7 °C) (Oral)   Resp 16   Ht 5' 11" (1 803 m)   Wt 136 kg (298 lb 15 1 oz)   SpO2 98%   BMI 41 69 kg/m²     General Appearance:    Alert, cooperative, no distress, appears stated age   Head:    Normocephalic, without obvious abnormality, atraumatic   Eyes:    Conjunctiva/corneas clear   Ears:    Normal external ears   Nose:   Nares normal, septum midline, mucosa normal, no drainage    or sinus tenderness   Throat:   Lips, mucosa, and tongue normal; teeth and gums normal   Neck: Supple   Back:     Symmetric, no curvature, ROM normal, no CVA tenderness   Lungs:     Decreased bilaterally but otherwise clear   Chest wall:    No tenderness or deformity   Heart:    Regular rate and rhythm, S1 and S2 normal, no murmur, rub   or gallop   Abdomen:     Soft, non-tender, bowel sounds active   Extremities:   Extremities normal, atraumatic, no cyanosis, +3 lower extremity edema    Skin:   Skin color, texture, turgor normal, no rashes or lesions   Lymph nodes:   Cervical normal   Neurologic:   CNII-XII intact            Lab, Imaging and other studies: I have personally reviewed pertinent labs  CBC:   Lab Results   Component Value Date    WBC 4 91 03/10/2021    HGB 9 2 (L) 03/10/2021    HCT 31 1 (L) 03/10/2021    MCV 91 03/10/2021     03/10/2021    MCH 26 8 03/10/2021    MCHC 29 6 (L) 03/10/2021    RDW 16 8 (H) 03/10/2021    MPV 11 8 03/10/2021    NRBC 0 03/10/2021     CMP:   Lab Results   Component Value Date    K 3 4 (L) 03/10/2021     03/10/2021    CO2 31 03/10/2021    BUN 28 (H) 03/10/2021    CREATININE 1 61 (H) 03/10/2021    CALCIUM 9 4 03/10/2021    AST 25 03/10/2021    ALT 24 03/10/2021    ALKPHOS 130 (H) 03/10/2021    EGFR 45 03/10/2021           Results from last 7 days   Lab Units 03/10/21  0432 03/09/21  0505 03/08/21  0537   POTASSIUM mmol/L 3 4* 3 4* 3 6   CHLORIDE mmol/L 100 100 99*   CO2 mmol/L 31 33* 33*   BUN mg/dL 28* 27* 27*   CREATININE mg/dL 1 61* 1 60* 1 67*   CALCIUM mg/dL 9 4 9 4 9 3   ALK PHOS U/L 130* 119*  --    ALT U/L 24 16  --    AST U/L 25 25  --          Phosphorus:   Lab Results   Component Value Date    PHOS 4 0 03/10/2021     Magnesium:   Lab Results   Component Value Date    MG 2 3 03/10/2021     Urinalysis: No results found for: COLORU, CLARITYU, SPECGRAV, PHUR, LEUKOCYTESUR, NITRITE, PROTEINUA, GLUCOSEU, KETONESU, BILIRUBINUR, BLOODU  Ionized Calcium: No results found for: LIBAN  Coagulation:   Lab Results   Component Value Date    INR 2 60 (H) 03/10/2021     Troponin: No results found for: TROPONINI  ABG: No results found for: PHART, WNF4BXE, PO2ART, NOU1QFU, Q3WWNRZI, BEART, SOURCE  Radiology review:     IMAGING  No results found      Current Facility-Administered Medications   Medication Dose Route Frequency    bisacodyl (DULCOLAX) EC tablet 10 mg  10 mg Oral After Dinner    carvedilol (COREG) tablet 6 25 mg  6 25 mg Oral BID With Meals    cholecalciferol (VITAMIN D3) tablet 2,000 Units  2,000 Units Oral Daily    docusate sodium (COLACE) capsule 100 mg  100 mg Oral BID    ferrous sulfate tablet 325 mg  325 mg Oral Daily With Breakfast    furosemide (LASIX) tablet 40 mg  40 mg Oral BID    insulin lispro (HumaLOG) 100 units/mL subcutaneous injection 1-6 Units  1-6 Units Subcutaneous HS    insulin lispro (HumaLOG) 100 units/mL subcutaneous injection 2-12 Units  2-12 Units Subcutaneous TID AC    nicotine (NICODERM CQ) 7 mg/24hr TD 24 hr patch 1 patch  1 patch Transdermal Daily    oxyCODONE (ROXICODONE) IR tablet 5 mg  5 mg Oral Q4H PRN    pantoprazole (PROTONIX) EC tablet 40 mg  40 mg Oral Early Morning    polyvinyl alcohol (LIQUIFILM TEARS) 1 4 % ophthalmic solution 2 drop  2 drop Both Eyes Q3H PRN    potassium chloride (K-DUR,KLOR-CON) CR tablet 40 mEq  40 mEq Oral TID With Meals    warfarin (COUMADIN) tablet 5 mg  5 mg Oral Daily (warfarin)     Medications Discontinued During This Encounter   Medication Reason    acetaminophen (TYLENOL) tablet 650 mg     furosemide (LASIX) injection 40 mg     potassium chloride (K-DUR,KLOR-CON) CR tablet 20 mEq     potassium chloride (K-DUR,KLOR-CON) CR tablet 20 mEq     potassium chloride (K-DUR,KLOR-CON) CR tablet 20 mEq     digoxin (LANOXIN) tablet 250 mcg     potassium chloride (K-DUR,KLOR-CON) CR tablet 20 mEq     digoxin (LANOXIN) tablet 250 mcg     potassium chloride (K-DUR,KLOR-CON) CR tablet 20 mEq     potassium chloride (K-DUR,KLOR-CON) CR tablet 40 mEq     furosemide (LASIX) 500 mg infusion 50 mL        Adriándrchaz Lou, DO      This progress note was produced in part using a dictation device which may document imprecise wording from author's original intent

## 2021-03-11 NOTE — CASE MANAGEMENT
Pt is being discharged today  Pt's wife will bring the patient home  I in basket messaged pt's PCP to call the patient at home with a follow up appointment  Pt has a follow up appointments scheduled with Cardiology, Sleep study, Pulmonary and GI  I called to schedule Cardiac Rehab for patient I left message for them to call to schedule appointment   Discharge instructions reviewed with patient with a good understanding  Case Management reviewed discharge planning process including the following: identifying help that is needed at home, pt's preference for discharge needs and Meds at [facility]  Reviewed with Pt that any member of the healthcare team can answer questions regarding : medications, jmportance of recognizing  Signs and symptoms of any  medical problems  Case Management also encouraged pt to follow up with all recommended appointments after discharge  Sage Gaviria

## 2021-03-11 NOTE — ASSESSMENT & PLAN NOTE
· Resolved  · A PTH-related peptide level was checked with results pending at the time of discharge  Outpatient follow-up with PCP in regards to this matter    Results for Ezequiel Perez (MRN [id number]) as of 3/11/2021 15:42   Ref  Range 3/11/2021 04:35   Calcium, Ionized Latest Ref Range: 1 12 - 1 32 mmol/L 1 13     Results for Ezequiel Perez (MRN [id number]) as of 3/11/2021 15:43   Ref   Range 3/11/2021 04:35   PARATHYROID HORMONE Latest Ref Range: 18 4 - 80 1 pg/mL 55 1

## 2021-03-11 NOTE — ASSESSMENT & PLAN NOTE
· Resolved with potassium chloride supplementation during the hospitalization  · Continue potassium chloride 20 meQ PO BID with his PO lasix dosing upon discharge  · Follow the potassium level and magnesium level after discharge with his PCP    Results from last 7 days   Lab Units 03/11/21  0435 03/10/21  0432 03/09/21  0505   SODIUM mmol/L 137 139 139   POTASSIUM mmol/L 3 8 3 4* 3 4*   CHLORIDE mmol/L 99* 100 100   CO2 mmol/L 34* 31 33*   BUN mg/dL 28* 28* 27*   CREATININE mg/dL 1 57* 1 61* 1 60*   CALCIUM mg/dL 9 2 9 4 9 4

## 2021-03-11 NOTE — ASSESSMENT & PLAN NOTE
· Continue PO coreg  · The patient developed bradycardia, and the digoxin decreased to every other day dosing by Cardiology on 3/5/21  · The digoxin was discontinued on 03/08/2021 by Cardiology  · Continue anticoagulation with PO coumadin for a goal INR of 2-3  · Outpatient follow-up with Cardiology  Results for Raiza Sharp (MRN [id number]) as of 2/24/2021 12:39   Ref   Range 2/23/2021 05:27   DIGOXIN LEVEL Latest Ref Range: 0 8 - 2 0 ng/mL 1 7

## 2021-03-11 NOTE — ASSESSMENT & PLAN NOTE
· Vitamin B12 level, and folate level WNL  · Iron panel: decreased iron and low normal ferritin, initiated ferrous sulfate PO daily  · Check the patient's stool for occult blood x 3 specimens: negative x 1  · Follow the CBC after discharge with his PCP     Results from last 7 days   Lab Units 03/11/21  0435 03/10/21  0432 03/09/21  0505   WBC Thousand/uL 5 14 4 91 4 97   HEMOGLOBIN g/dL 9 6* 9 2* 9 6*   HEMATOCRIT % 32 2* 31 1* 32 4*   PLATELETS Thousands/uL 193 181 182

## 2021-03-11 NOTE — ASSESSMENT & PLAN NOTE
Wt Readings from Last 3 Encounters:   03/11/21 136 kg (298 lb 15 1 oz)   01/13/21 (!) 159 kg (351 lb 6 4 oz)   01/12/21 (!) 161 kg (356 lb)     · Troponin levels were within normal limits x 3 sets  · Initiated on a lasix gtt during the hospitalization  · Increased lasix gtt @ 20 mg/hr on 02/28/2021, decreased lasix gtt to 10 mg/hr on 03/03/2021 by Cardiology  · Discontinue the lasix gtt on 03/10/2021  · Transitioned to lasix 80 mg PO BID on 03/10/2021  · Continue coreg 6 25 mg PO BID  · Cardiology and Nephrology recommendations appreciated    · Outpatient follow-up with Cardiology

## 2021-03-11 NOTE — ASSESSMENT & PLAN NOTE
· Recurrent right-sided pleural effusion  · Successful right-sided thoracentesis by Intervention Radiology on 02/23/2021, which yielded 1600ml of pleural fluid  · Initiated on a lasix gtt during the hospitalization  · Increased lasix gtt @ 20 mg/hr on 02/28/2021, decreased lasix gtt to 10 mg/hr on 03/03/2021 by Cardiology  · Discontinue the lasix gtt on 03/10/2021  · Transitioned to lasix 80 mg PO BID on 03/10/2021  · Appears to be a transudative pleural effusion by fluid analysis  · The patient was seen in consultation by Pulmonology    · Outpatient follow-up with Pulmonology

## 2021-03-11 NOTE — DISCHARGE SUMMARY
[address] [facility] [address]  Discharge- Belvie Night 1960, 57 y o  male MRN: [id number]  Unit/Bed#: 410-01 Encounter: [id number]  Primary Care Provider: Carmen Springer DO   Date and time admitted to hospital: 2/22/2021  9:27 AM    * Acute on chronic diastolic CHF (congestive heart failure) ([facility] [address] )  Assessment & Plan  Wt Readings from Last 3 Encounters:   03/11/21 136 kg (298 lb 15 1 oz)   01/13/21 (!) 159 kg (351 lb 6 4 oz)   01/12/21 (!) 161 kg (356 lb)     · Troponin levels were within normal limits x 3 sets  · Initiated on a lasix gtt during the hospitalization  · Increased lasix gtt @ 20 mg/hr on 02/28/2021, decreased lasix gtt to 10 mg/hr on 03/03/2021 by Cardiology  · Discontinue the lasix gtt on 03/10/2021  · Transitioned to lasix 80 mg PO BID on 03/10/2021  · Continue coreg 6 25 mg PO BID  · Cardiology and Nephrology recommendations appreciated  · Outpatient follow-up with Cardiology        Pleural effusion on right  Assessment & Plan  · Recurrent right-sided pleural effusion  · Successful right-sided thoracentesis by Intervention Radiology on 02/23/2021, which yielded 1600ml of pleural fluid  · Initiated on a lasix gtt during the hospitalization  · Increased lasix gtt @ 20 mg/hr on 02/28/2021, decreased lasix gtt to 10 mg/hr on 03/03/2021 by Cardiology  · Discontinue the lasix gtt on 03/10/2021  · Transitioned to lasix 80 mg PO BID on 03/10/2021  · Appears to be a transudative pleural effusion by fluid analysis  · The patient was seen in consultation by Pulmonology  · Outpatient follow-up with Pulmonology    Acute respiratory failure with hypoxia (HCC)  Assessment & Plan  · Initially required 4 lpm of continuous supplemental oxygen via the nasal cannula to maintain oxygen saturations at 92% and above  · Successfully weaned off supplemental oxygen at this time  · Secondary to right-sided pleural effusion    · The patient did not require any supplemental oxygen with normal oxygen saturation levels on room air on the day of discharge  · Incentive spirometry 10 times per hour while awake was utilized during the hospitalization  Cirrhosis of liver with ascites [facility])  Assessment & Plan  · The patient was seen in consultation by Gastroenterology  · Avoid all hepatotoxic agents  · Outpatient hepatocellular carcinoma surveillance and follow-up with Gastroenterology  US liver doppler only  Status: Final result   PACS Images     Show images for US liver doppler only   Study Result    ABDOMEN ULTRASOUND, COMPLETE WITH DOPPLER     INDICATION:    Cirrhosis with ascites        COMPARISON:  Compared with 10/2/2020     TECHNIQUE:   Real-time ultrasound of the abdomen was performed with a curvilinear transducer with Doppler evaluation of liver      FINDINGS:     The main portal vein and primary branch segments are patent and hepatopetal with normal spectral waveform  Peak systolic velocity at 32 cm/s  The right at 42 cm/s and the left at 59 cm/s      Hepatic veins are patent  Spectral waveforms within normal limits  Right hepatic vein at 57 cm/s, mid hepatic vein at 56 cm/s and left hepatic vein at 77 cm/s        Main hepatic artery appears normal size, patent with normal spectral waveform      Splenic vein is patent  56 cm/s      IMPRESSION:     Patent portal vein and hepatic veins  Hypercalcemia  Assessment & Plan  · Resolved  · A PTH-related peptide level was checked with results pending at the time of discharge  Outpatient follow-up with PCP in regards to this matter    Results for Sopchoppy Cabot (MRN [id number]) as of 3/11/2021 15:42   Ref  Range 3/11/2021 04:35   Calcium, Ionized Latest Ref Range: 1 12 - 1 32 mmol/L 1 13     Results for Sopchoppy Cabot (MRN [id number]) as of 3/11/2021 15:43   Ref   Range 3/11/2021 04:35   PARATHYROID HORMONE Latest Ref Range: 18 4 - 80 1 pg/mL 55 1       Anemia  Assessment & Plan  · Vitamin B12 level, and folate level WNL  · Iron panel: decreased iron and low normal ferritin, initiated ferrous sulfate PO daily  · Check the patient's stool for occult blood x 3 specimens: negative x 1  · Follow the CBC after discharge with his PCP     Results from last 7 days   Lab Units 03/11/21  0435 03/10/21  0432 03/09/21  0505   WBC Thousand/uL 5 14 4 91 4 97   HEMOGLOBIN g/dL 9 6* 9 2* 9 6*   HEMATOCRIT % 32 2* 31 1* 32 4*   PLATELETS Thousands/uL 193 181 182         Atelectasis  Assessment & Plan  · Incentive spirometry 10 times per hour while awake was utilized during the hospitalization  Vitamin D deficiency  Assessment & Plan  · Initiated on cholecalciferol 2000 I  U  PO Qdaily  · Recheck a vitamin D 25-OH level in 1-2 months with his PCP  Results for Thang Kingsley (MRN [id number]) as of 2/24/2021 12:28   Ref  Range 2/23/2021 05:26   Vit D, 25-Hydroxy Latest Ref Range: 30 0 - 100 0 ng/mL 13 1 (L)           Pulmonary hypertension (HCC)  Assessment & Plan  · Outpatient follow-up with Pulmonology    Constipation  Assessment & Plan  · Resolved with a bowel regimen during hospitalization  · Utilize colace 100 mg PO BID PRN and miralax 17 grams PO Qdaily PRN as an outpatient    Hyponatremia  Assessment & Plan  · Likely hypervolemic hyponatremia  · Resolved with IV lasix treatment  · Initiated on a lasix gtt during the hospitalization  · Increased lasix gtt @ 20 mg/hr on 02/28/2021, decreased lasix gtt to 10 mg/hr on 03/03/2021 by Cardiology    · Discontinue the lasix gtt on 03/10/2021  · Transitioned to lasix 80 mg PO BID on 03/10/2021  · Follow the sodium level after discharge with his PCP    Results from last 7 days   Lab Units 03/11/21  0435 03/10/21  0432 03/09/21  0505   SODIUM mmol/L 137 139 139   POTASSIUM mmol/L 3 8 3 4* 3 4*   CHLORIDE mmol/L 99* 100 100   CO2 mmol/L 34* 31 33*   BUN mg/dL 28* 28* 27*   CREATININE mg/dL 1 57* 1 61* 1 60*   CALCIUM mg/dL 9 2 9 4 9 4         Tobacco use  Assessment & Plan  · A nicotine patch was utilized during the hospitalization  · Smoking cessation counseling was given to the patient during the hospitalization  Type 2 diabetes mellitus with hyperglycemia, without long-term current use of insulin [facility])  Assessment & Plan  Lab Results   Component Value Date    HGBA1C 6 1 (H) 01/05/2021       Recent Labs     03/10/21  1110 03/10/21  1515 03/10/21  2038 03/11/21  0717   POCGLU 122 117 117 134       Blood Sugar Average: Last 72 hrs:  (P) 121 [id number]     · Recently take off metformin as an outpatient secondary to his liver disease  · Not currently on any diabetic medications as an outpatient  · Consider the initiation of an ACE-inhibitor for renal protection in the setting of type 2 diabetes mellitus if his renal function remains stable  Outpatient follow-up with PCP in regards to this matter    Lesion of spleen  Assessment & Plan  · CT scan of the abdomen/pelvis (02/22/2021): Subcapsular cystic lesion in the spleen (probably related to old trauma)   Outpatient follow-up with PCP in regards to this matter  Diverticulosis of colon  Assessment & Plan  · Outpatient follow-up with Gastroenterology  Permanent atrial fibrillation ([facility] [address] )  Assessment & Plan  · Continue PO coreg  · The patient developed bradycardia, and the digoxin decreased to every other day dosing by Cardiology on 3/5/21  · The digoxin was discontinued on 03/08/2021 by Cardiology  · Continue anticoagulation with PO coumadin for a goal INR of 2-3  · Outpatient follow-up with Cardiology  Results for Mikey Reynolds (MRN [id number]) as of 2/24/2021 12:39   Ref  Range 2/23/2021 05:27   DIGOXIN LEVEL Latest Ref Range: 0 8 - 2 0 ng/mL 1 7       Lactic acidosis  Assessment & Plan  · Resolved with thiamine 200 mg IV x 1 dose on 02/22/2021      Hypokalemia  Assessment & Plan  · Resolved with potassium chloride supplementation during the hospitalization  · Continue potassium chloride 20 meQ PO BID with his PO lasix dosing upon discharge  · Follow the potassium level and magnesium level after discharge with his PCP    Results from last 7 days   Lab Units 03/11/21  0435 03/10/21  0432 03/09/21  0505   SODIUM mmol/L 137 139 139   POTASSIUM mmol/L 3 8 3 4* 3 4*   CHLORIDE mmol/L 99* 100 100   CO2 mmol/L 34* 31 33*   BUN mg/dL 28* 28* 27*   CREATININE mg/dL 1 57* 1 61* 1 60*   CALCIUM mg/dL 9 2 9 4 9 4         CKD (chronic kidney disease) stage 3, GFR 30-59 ml/min  Assessment & Plan  Lab Results   Component Value Date    EGFR 47 03/11/2021    EGFR 45 03/10/2021    EGFR 45 03/09/2021    CREATININE 1 57 (H) 03/11/2021    CREATININE 1 61 (H) 03/10/2021    CREATININE 1 60 (H) 03/09/2021     · Baseline serum creatinine of 1 3-1 6 mg/dl  · Avoid all nephrotoxic agents  · Monitor the patient's renal function and electrolyte levels after discharge with his PCP and with Nephrology  · The patient was seen in consultation by Nephrology and will need continued outpatient follow-up with Nephrology  STEFF (obstructive sleep apnea)  Assessment & Plan  · Continue BIPAP at 17/11 QHS and anytime while sleeping      Discharging Physician / Practitioner: Matt Dorman DO  PCP: Dotty Gifford DO  Admission Date:   Admission Orders (From admission, onward)     Ordered        02/22/21 1140  Inpatient Admission  Once                   Discharge Date: 03/11/21      Consultations During Hospital Stay:  · Cardiology  · Gastroenterology  · Pulmonology  · Nephrology    Procedures Performed:   · None    Significant Findings / Test Results:   Xr Chest Portable    Result Date: 2/23/2021  Impression: Decrease in right effusion and right base atelectasis with no pneumothorax  Workstation performed: [id number]     Xr Chest Portable    Result Date: 2/23/2021  Impression: 1  Persistent right basilar opacity shown on recent CT to represent a large right pleural effusion with collapse of the right middle and lower lobes  2   Stable enlargement of the cardiac silhouette   Workstation performed: [id number]     Ir In-patient Thoracentesis    Result Date: 2/24/2021  Impression: Technically successful ultrasound-guided thoracentesis  This is the end of the clinically relevant portion of this report  Subsequent information is for compliance, documentation, and coding purposes  In the process of informed consent, information was communicated, verbally, to the patient regarding the procedure  The patient was informed of; the name of the procedure, nature of the procedure, nature of the condition, risks, benefits, alternatives, and potential complications, as well as the consequences of not having the procedure  All the patient's questions were answered  Informed consent was signed  Quoted risks include bleeding, and pneumothorax, including axial vacuo pneumothorax  Chlorhexidine and alcohol was used for cleansing and sterile preparation of the skin  This was allowed to dry prior to the application of sterile draping  Timeout was performed, with all team members present, and in agreement  Confirmation of patient, procedure, laterally, allergies, and all needed equipment was performed  PROCEDURE: Thoracentesis PREPROCEDURE DIAGNOSIS: Please see "history ", above POSTPROCEDURE DIAGNOSIS: Same ANTIBIOTICS: None SPECIMEN: Pleural fluid ESTIMATED BLOOD LOSS: None ANESTHESIA: Local FINAL DISPOSITION OF CATHETER: Out of patient's body Ultrasound was used to evaluate the pleural fluid as a potential access site  Static and real-time images of needle entry were obtained, and archived  Workstation performed: [id number]     Ct Chest Abdomen Pelvis W Contrast    Result Date: 2/22/2021  Impression: 1  Moderate-sized to large right pleural effusion with multisegmental compressive collapse in the right middle and lower lobes  2   Other findings of volume overload including engorgement of the IVC, mild ascites, and subcutaneous edema   3   Antidependent distribution of body wall soft tissue swelling is somewhat atypical for anasarca  Correlate for evidence of cellulitis  4   Chronic abdominopelvic findings including hepatomegaly and probable cirrhosis, cholelithiasis, subcapsular cystic lesion in the spleen (probably related to old trauma), and colonic diverticulosis  No acute or inflammatory findings in the abdomen or pelvis  Workstation performed: [id number]     Us Liver Doppler Only    Result Date: 2021  Impression: Patent portal vein and hepatic veins   Workstation performed: [id number]     ECG 12 lead  Order: [id number]  Status:  Final result   [facility] patient:  No [facility]   Next appt:  2021 at 09:20 AM in Cardiology [facility] DO Rocky)   Ref Range & Units 21 0946   Ventricular Rate BPM 76    Atrial Rate BPM 61    NE Interval ms    QRSD Interval ms 100    QT Interval ms 350    QTC Interval ms 393    P Axis degrees    QRS Axis degrees 26    T Wave Axis degrees 5       Narrative & Impression    Atrial fibrillation  Low voltage QRS  Incomplete right bundle branch block  Abnormal ECG  When compared with ECG of 21-AUG-2020 11:38,  No significant change was found  Confirmed by Kamila Pereyra ([id number]) on 2021 9:06:10 AM      Specimen Collected: 21 09:46   Last Resulted: 21 09:06           Echo complete with contrast if indicated  Status: Final result   PACS Images     Show images for Echo complete with contrast if indicated   Study Result    [address] [facility] [address] [facility] [address]  [phone]     Transthoracic Echocardiogram  2D, M-mode, Doppler, and Color Doppler     Study date:  2021     Patient: Tyesha Ramirez  MR number: [id number]  Account number: [de-identified]  : 1960  Age: 57 years  Gender: Male  Status: Inpatient  Location: Bedside  Height: 71 in  Weight: 350 lb  BP: 130/ 80 mmHg     Indications: shortness of breath     Diagnoses: 428 0 - CONGESTIVE HEART FAILURE     Sonographer: LATASHA Ng  Primary Physician:  Brody Hale DO  Referring Physician:  Makenzie Walsh DO  Group:  [facility]  Interpreting Physician:  Ryland Rothman DO     SUMMARY     LEFT VENTRICLE:  Systolic function was normal  Ejection fraction was estimated to be 60 %  There were no regional wall motion abnormalities  Wall thickness was mildly increased      RIGHT VENTRICLE:  The ventricle was moderately dilated  Systolic function was mildly reduced      LEFT ATRIUM:  The atrium was mildly dilated      RIGHT ATRIUM:  The atrium was moderately dilated      MITRAL VALVE:  There was mild regurgitation      TRICUSPID VALVE:  There was moderate regurgitation  Estimated peak PA pressure was 52 mmHg      HISTORY: PRIOR HISTORY: CHF, morbid obesity     PROCEDURE: The procedure was performed at the bedside  This was a routine study  The transthoracic approach was used  The study included complete 2D imaging, M-mode, complete spectral Doppler, and color Doppler  The heart rate was 80 bpm,  at the start of the study  Echocardiographic views were limited due to poor patient compliance and poor acoustic window availability  This was a technically difficult study      LEFT VENTRICLE: Size was normal  Systolic function was normal  Ejection fraction was estimated to be 60 %  There were no regional wall motion abnormalities  Wall thickness was mildly increased  DOPPLER: The study was not technically  sufficient to allow evaluation of LV diastolic function      RIGHT VENTRICLE: The ventricle was moderately dilated  Systolic function was mildly reduced      LEFT ATRIUM: The atrium was mildly dilated      RIGHT ATRIUM: The atrium was moderately dilated      MITRAL VALVE: Valve structure was normal  There was normal leaflet separation  DOPPLER: The transmitral velocity was within the normal range  There was no evidence for stenosis   There was mild regurgitation      AORTIC VALVE: The valve was trileaflet  Leaflets exhibited normal thickness and normal cuspal separation  DOPPLER: Transaortic velocity was within the normal range  There was no evidence for stenosis  There was no regurgitation      TRICUSPID VALVE: The valve structure was normal  There was normal leaflet separation  DOPPLER: The transtricuspid velocity was within the normal range  There was no evidence for stenosis  There was moderate regurgitation  Estimated peak PA  pressure was 52 mmHg      PULMONIC VALVE: Leaflets exhibited normal thickness, no calcification, and normal cuspal separation  DOPPLER: The transpulmonic velocity was within the normal range  There was no regurgitation      PERICARDIUM: There was no pericardial effusion   The pericardium was normal in appearance      AORTA: The root exhibited normal size      SYSTEMIC VEINS: IVC: The inferior vena cava was not well visualized      SYSTEM MEASUREMENT TABLES     2D  %FS: 31 45 %  Ao Diam: 3 22 cm  EDV(Teich): 148 86 ml  EF(Teich): 58 74 %  ESV(Teich): 61 42 ml  IVSd: 1 51 cm  LA Area: 20 63 cm2  LA Diam: 5 65 cm  LVIDd: 5 52 cm  LVIDs: 3 79 cm  LVPWd: 1 08 cm  RA Area: 33 86 cm2  RWT: 0 39  SV(Teich): 87 44 ml     CW  RAP: 0 mmHg  TR Vmax: 3 47 m/s  TR maxP 33 mmHg     PW  E' Sept: 0 09 m/s  E/E' Sept: 13 35  MV A Ti: 0 48 m/s  MV Dec McLennan: 4 5 m/s2  MV DecT: 253 15 ms  MV E Ti: 1 14 m/s  MV E/A Ratio: 2 36  RVSP: 48 33 mmHg     [facility] Accredited Echocardiography Laboratory     Prepared and electronically signed by  Lyndon Parnell DO  Signed 2021 08:06:00     Results from last 7 days   Lab Units 03/11/21  0435 03/10/21  0432 03/09/21  0505   WBC Thousand/uL 5 14 4 91 4 97   HEMOGLOBIN g/dL 9 6* 9 2* 9 6*   HEMATOCRIT % 32 2* 31 1* 32 4*   PLATELETS Thousands/uL 193 181 182     Results from last 7 days   Lab Units 21  0435 03/10/21  0432 21  0505   SODIUM mmol/L 137 139 139   POTASSIUM mmol/L 3 8 3 4* 3 4*   CHLORIDE mmol/L 99* 100 100   CO2 mmol/L 34* 31 33*   BUN mg/dL 28* 28* 27*   CREATININE mg/dL 1 57* 1 61* 1 60*   CALCIUM mg/dL 9 2 9 4 9 4     Results from last 7 days   Lab Units 03/11/21  0435 03/10/21  0432 03/09/21  0505   MAGNESIUM mg/dL 2 4 2 3 2 2     Results from last 7 days   Lab Units 03/11/21  0435 03/10/21  0432 03/09/21  0505   ALK PHOS U/L 126* 130* 119*   ALT U/L 25 24 16   AST U/L 27 25 25     Microbiology Results (last 21 days)    Collected Updated Procedure Result Status Patient Facility Result Comment    02/28/2021 2319 03/08/2021 1557 Occult blood x 3, stool [[id number]]   Stool from Rectum    Final result [address]  Component Value   Occult Blood, Stool #1 Negative   Occult Blood, Stool #2 Test not performed    Occult Blood, Stool #3 Test not performed    DATE SPECIMEN #1 02/28/2021   DATE SPECIMEN #2 --   DATE SPECIMEN #3 --           02/23/2021 1016 02/27/2021 1505 Body fluid culture (Pleural Fluid Culture) and Gram stain [[id number]]    (Abnormal)   Body Fluid from Pleural, Right    Final result [address]  Component Value   Body Fluid Culture, Sterile 1+ Growth of Staphylococcus warneriAbnormal     Few Colonies of Staphylococcus epidermidisAbnormal     Growth in Broth culture only Corynebacterium aurimucosum groupAbnormal    GRAM STAIN RESULT Rare Polys    No bacteria seen       Susceptibility    Staphylococcus warneri (1)    Antibiotic Interpretation Microscan Method Status   ZID Performed  Yes  LINDA Final   Ampicillin ($$) Resistant <=2 00 ug/ml LINDA Final   Cefazolin ($) Susceptible <=4 00 ug/ml LINDA Final   Clindamycin ($) Susceptible <=0 25 ug/ml LINDA Final   Erythromycin ($$$$) Susceptible <=0 25 ug/ml LINDA Final   Gentamicin ($$) Susceptible <=1 ug/ml LINDA Final   Oxacillin Susceptible <=0 25 ug/ml LINDA Final   Tetracycline Susceptible <=2 ug/ml LINDA Final   Trimethoprim + Sulfamethoxazole ($$$) Susceptible <=0 5/9 5 ug/ml LINDA Final   Vancomycin ($) Susceptible 0 50 ug/ml LINDA Final   Staphylococcus epidermidis (2)    Antibiotic Interpretation Microscan Method Status   ZID Performed  Yes  LINDA Final   Ampicillin ($$) Resistant 8 00 ug/ml LINDA Final   Cefazolin ($) Susceptible <=4 00 ug/ml LINDA Final   Clindamycin ($) Susceptible <=0 25 ug/ml LINDA Final   Erythromycin ($$$$) Resistant >4 00 ug/ml LINDA Final   Gentamicin ($$) Susceptible <=1 ug/ml LINDA Final   Oxacillin Susceptible <=0 25 ug/ml LINDA Final   Tetracycline Susceptible <=2 ug/ml LINDA Final   Trimethoprim + Sulfamethoxazole ($$$) Susceptible <=0 5/9 5 ug/ml LINDA Final   Vancomycin ($) Susceptible 2 00 ug/ml LINDA Final   Corynebacterium aurimucosum group (3)    Antibiotic Interpretation Microscan Method Status   ZID Performed  Yes  LINDA Final    Condensed View          02/23/2021 1016 02/23/2021 1049 LD (LDH), Body Fluid [[id number]]   Body Fluid from Other    Final result [address]  Component Value Units   LACTATE DEHYDROGENASE FLUID 88  U/L           02/23/2021 1016 02/23/2021 1049 Total Protein, body fluid [[id number]]   Body Fluid from Other    Final result [address]  Component Value Units   Protein, Fluid 2 5  g/dL           02/22/2021 1159 02/27/2021 1901 Blood culture #1 [[id number]]   Blood from Arm, Left    Final result [address]  Component Value   Blood Culture No Growth After 5 Days                02/22/2021 0947 02/22/2021 1038 COVID19, Influenza A/B, RSV PCR, [facility] [[id number]]    Nares from Nasopharyngeal Swab    Final result [address]  This test has been authorized by FDA under an EUA (Emergency Use Assay) for use by authorized laboratories  Donovan Foster caution and judgement should be used with the interpretation of these results with consideration of the clinical impression and other laboratory testing   Testing reported as "Positive" or "Negative" has been proven to be accurate according to standard laboratory validation requirements   All testing is performed with control materials showing appropriate reactivity at standard intervals  Component Value   SARS-COV-2 Negative   INFLU A PCR Negative   INFLU B PCR Negative   RSV PCR Negative              02/22/2021 0945 02/27/2021 1901 Blood culture #2 [[id number]]   Blood from Arm, Right    Final result [address] [place]  Component Value   Blood Culture No Growth After 5 Days  Test Results Pending at Discharge (will require follow-up):  · PTH-related peptide level (follow-up with PCP)     Outpatient Tests Requested:  · CBC with diff , CMP, Magnesium level, and Phosphorus level next week    Complications:  None    Reason for Admission:  Acute on chronic diastolic CHF (congestive heart failure)    Hospital Course:     Hannah Cantu is a 57 y o  male patient who originally presented to the hospital on 2/22/2021 due to shortness of breath  Please see above list of diagnoses and related plan for additional information  Condition at Discharge: good     Discharge Day Visit / Exam:     Subjective: The patient was seen and examined  The patient is doing better  No chest pain  No shortness of breath  No abdominal pain  No nausea or vomiting      Vitals: Blood Pressure: 103/54 (03/11/21 0714)  Pulse: 65 (03/11/21 0714)  Temperature: 98 1 °F (36 7 °C) (03/11/21 0714)  Temp Source: Temporal (03/10/21 2202)  Respirations: 18 (03/11/21 0714)  Height: 5' 11" (180 3 cm) (02/22/21 1238)  Weight - Scale: 136 kg (298 lb 15 1 oz) (03/11/21 0600)  SpO2: 97 % (03/11/21 0714)  Exam:   Physical Exam  General:  NAD, follows commands  HEENT:  NC/AT, mucous membranes moist  Neck:  Supple, No JVP elevation  CV:  + S1, + S2, Irregularly irregular rhythm, Regular rate  Pulm:  Lung fields are CTA bilaterally  Abd:  Soft, Non-tender, Non-distended  Ext:  No clubbing/cyanosis, Improved edema of the bilateral lower extremities  Skin:  No rashes  Neuro:  Awake, alert, oriented  Psych:  Normal mood and affect    Discharge instructions/Information to patient and family:  See after visit summary for information provided to patient and family  Provisions for Follow-Up Care:  See after visit summary for information related to follow-up care and any pertinent home health orders  Disposition:     Home with VNA Services (Reminder: Complete face to face encounter)     Planned Readmission:  No     Discharge Statement:  I spent 45 minutes discharging the patient  This time was spent on the day of discharge  I had direct contact with the patient on the day of discharge  Greater than 50% of the total time was spent examining patient, answering all patient questions, arranging and discussing plan of care with patient as well as directly providing post-discharge instructions  Additional time then spent on discharge activities  Discharge Medications:  See after visit summary for reconciled discharge medications provided to patient and family        ** Please Note: This note has been constructed using a voice recognition system **

## 2021-03-11 NOTE — ASSESSMENT & PLAN NOTE
· Likely hypervolemic hyponatremia  · Resolved with IV lasix treatment  · Initiated on a lasix gtt during the hospitalization  · Increased lasix gtt @ 20 mg/hr on 02/28/2021, decreased lasix gtt to 10 mg/hr on 03/03/2021 by Cardiology    · Discontinue the lasix gtt on 03/10/2021  · Transitioned to lasix 80 mg PO BID on 03/10/2021  · Follow the sodium level after discharge with his PCP    Results from last 7 days   Lab Units 03/11/21  0435 03/10/21  0432 03/09/21  0505   SODIUM mmol/L 137 139 139   POTASSIUM mmol/L 3 8 3 4* 3 4*   CHLORIDE mmol/L 99* 100 100   CO2 mmol/L 34* 31 33*   BUN mg/dL 28* 28* 27*   CREATININE mg/dL 1 57* 1 61* 1 60*   CALCIUM mg/dL 9 2 9 4 9 4

## 2021-03-11 NOTE — ASSESSMENT & PLAN NOTE
· Initiated on cholecalciferol 2000 I  U  PO Qdaily  · Recheck a vitamin D 25-OH level in 1-2 months with his PCP  Results for Kristy Shoemaker (MRN [id number]) as of 2/24/2021 12:28   Ref   Range 2/23/2021 05:26   Vit D, 25-Hydroxy Latest Ref Range: 30 0 - 100 0 ng/mL 13 1 (L)

## 2021-03-11 NOTE — ASSESSMENT & PLAN NOTE
· Resolved with a bowel regimen during hospitalization  · Utilize colace 100 mg PO BID PRN and miralax 17 grams PO Qdaily PRN as an outpatient

## 2021-03-11 NOTE — ASSESSMENT & PLAN NOTE
Lab Results   Component Value Date    HGBA1C 6 1 (H) 01/05/2021       Recent Labs     03/10/21  1110 03/10/21  1515 03/10/21  2038 03/11/21  0717   POCGLU 122 117 117 134       Blood Sugar Average: Last 72 hrs:  (P) 121 [id number]     · Recently take off metformin as an outpatient secondary to his liver disease  · Not currently on any diabetic medications as an outpatient  · Consider the initiation of an ACE-inhibitor for renal protection in the setting of type 2 diabetes mellitus if his renal function remains stable    Outpatient follow-up with PCP in regards to this matter

## 2021-03-11 NOTE — PROGRESS NOTES
Cardiology Progress Note - José Miguel Bowers 57 y o  male MRN: [id number]    Unit/Bed#: 410-01 Encounter: [id number]      Assessment/Plan:  1  Acute on chronic diastolic congestive heart failure              - has diuresed >70 lbs since admission 2 5 weeks ago              - transitioned to PO lasix 80 mg BID yesterday - renal function stable, weight remains the same at 298 lbs   - he is stable from a cardiac standpoint to be d/c home today   - patient was provided with a scale   - he was instructed to take metolazone 5 mg for a weight gain >3lbs in 1 night or >5 lbs in 1 week (has metolazone at home)   - heart failure education provided   - f/u with Dr Tonia Grace scheduled in 1 week      2  Chronic atrial fibrillation              - heart rates are well controlled on current dose of carvedilol               - digoxin discontinued given bradycardia              - continue anticoagulation with coumadin for goal INR 2-3     3  Hypertension               - controlled on current regimen    Subjective:   Patient seen and examined  He feels well today  Excited to go home  Denies shortness of breath and chest pain  Review of Systems   Constitution: Negative for chills and fever  HENT: Negative  Cardiovascular: Positive for leg swelling  Negative for chest pain, dyspnea on exertion, near-syncope, orthopnea, palpitations, paroxysmal nocturnal dyspnea and syncope  Respiratory: Negative for cough and shortness of breath  Gastrointestinal: Negative for diarrhea, nausea and vomiting  Genitourinary: Negative  Neurological: Negative for dizziness and light-headedness  All other systems reviewed and are negative  Objective:   Vitals: Blood pressure 103/54, pulse 65, temperature 98 1 °F (36 7 °C), resp  rate 18, height 5' 11" (1 803 m), weight 136 kg (298 lb 15 1 oz), SpO2 97 %  , Body mass index is 41 69 kg/m² ,   Orthostatic Blood Pressures      Most Recent Value   Blood Pressure  103/54 filed at 03/11/2021 5326 Patient Position - Orthostatic VS  Sitting filed at 03/10/2021 0093         Systolic (77NZQ), MBQ:763 , Min:100 , WPT:813     Diastolic (30ZHQ), YNQ:39, Min:53, Max:54      Intake/Output Summary (Last 24 hours) at 3/11/2021 0924  Last data filed at 3/11/2021 0755  Gross per 24 hour   Intake 1350 ml   Output 3100 ml   Net -1750 ml     Weight (last 2 days)     Date/Time   Weight    03/11/21 0600   136 (298 94)    03/10/21 0600   136 (298 94)    03/09/21 0600   136 (299)                Telemetry Review: patient not on telemetry  EKG personally reviewed by Liam Guerra PA-C  Physical Exam  Vitals signs reviewed  Constitutional:       General: He is not in acute distress  Appearance: He is well-developed  He is obese  He is not diaphoretic  HENT:      Head: Normocephalic and atraumatic  Eyes:      Pupils: Pupils are equal, round, and reactive to light  Neck:      Musculoskeletal: Normal range of motion  Vascular: No carotid bruit  Cardiovascular:      Rate and Rhythm: Normal rate  Rhythm irregularly irregular  Pulses:           Radial pulses are 2+ on the right side and 2+ on the left side  Heart sounds: S1 normal and S2 normal  No murmur  Pulmonary:      Effort: Pulmonary effort is normal  No respiratory distress  Breath sounds: Examination of the right-lower field reveals decreased breath sounds  Decreased breath sounds present  No wheezing or rales  Abdominal:      General: There is distension  Palpations: Abdomen is soft  Tenderness: There is no abdominal tenderness  Musculoskeletal: Normal range of motion  Right lower leg: Edema (+2 edema in bilateral lower extremities) present  Left lower leg: Edema present  Skin:     General: Skin is warm and dry  Findings: No erythema  Comments: Venous stasis changes in bilateral lower extremities   Neurological:      General: No focal deficit present        Mental Status: He is alert and oriented to person, place, and time  Psychiatric:         Mood and Affect: Mood normal          Behavior: Behavior normal            Laboratory Results:        CBC with diff:   Results from last 7 days   Lab Units 03/11/21  0435 03/10/21  0432 03/09/21  0505 03/08/21  0537 03/07/21  0457 03/06/21  0511 03/05/21  0434   WBC Thousand/uL 5 14 4 91 4 97 5 39 5 26 4 92 4 98   HEMOGLOBIN g/dL 9 6* 9 2* 9 6* 9 4* 9 3* 9 4* 9 6*   HEMATOCRIT % 32 2* 31 1* 32 4* 31 8* 31 8* 32 2* 32 6*   MCV fL 92 91 90 90 91 92 91   PLATELETS Thousands/uL 193 181 182 193 186 178 201   MCH pg 27 3 26 8 26 7* 26 7* 26 6* 26 7* 26 8   MCHC g/dL 29 8* 29 6* 29 6* 29 6* 29 2* 29 2* 29 4*   RDW % 16 6* 16 8* 16 6* 16 8* 16 7* 16 6* 16 6*   MPV fL 11 8 11 8 11 0 11 3 11 6 11 4 11 1   NRBC AUTO /100 WBCs 0 0 0 0 0 0 0         CMP:  Results from last 7 days   Lab Units 03/11/21  0435 03/10/21  0432 03/09/21  0505 03/08/21  0537 03/07/21  0457 03/06/21  0511 03/05/21  0434   POTASSIUM mmol/L 3 8 3 4* 3 4* 3 6 3 5 3 6 3 8   CHLORIDE mmol/L 99* 100 100 99* 101 100 98*   CO2 mmol/L 34* 31 33* 33* 35* 32 35*   BUN mg/dL 28* 28* 27* 27* 28* 27* 25   CREATININE mg/dL 1 57* 1 61* 1 60* 1 67* 1 57* 1 61* 1 65*   CALCIUM mg/dL 9 2 9 4 9 4 9 3 9 2 9 3 9 4   AST U/L 27 25 25  --   --   --   --    ALT U/L 25 24 16  --   --   --   --    ALK PHOS U/L 126* 130* 119*  --   --   --   --    EGFR ml/min/1 73sq m 47 45 45 43 47 45 44         BMP:  Results from last 7 days   Lab Units 03/11/21  0435 03/10/21  0432 03/09/21  0505 03/08/21  0537 03/07/21  0457 03/06/21  0511 03/05/21  0434   POTASSIUM mmol/L 3 8 3 4* 3 4* 3 6 3 5 3 6 3 8   CHLORIDE mmol/L 99* 100 100 99* 101 100 98*   CO2 mmol/L 34* 31 33* 33* 35* 32 35*   BUN mg/dL 28* 28* 27* 27* 28* 27* 25   CREATININE mg/dL 1 57* 1 61* 1 60* 1 67* 1 57* 1 61* 1 65*   CALCIUM mg/dL 9 2 9 4 9 4 9 3 9 2 9 3 9 4       BNP: No results for input(s): BNP in the last 72 hours      Magnesium:   Results from last 7 days   Lab Units 21  0435 03/10/21  0432 21  0505 21  0511   MAGNESIUM mg/dL 2 4 2 3 2 2 2 3       Coags:   Results from last 7 days   Lab Units 21  0435 03/10/21  0432 21  0505 21  0537 21  0511 21  0434   INR  2 53* 2 60* 2 54* 2 34* 2 41* 2 38*       TSH:        Hemoglobin A1C       Lipid Profile:       Cardiac testing:   Results for orders placed during the hospital encounter of 21   Echo complete with contrast if indicated    Narrative [address]  [address] [place] [address]  [phone]    Transthoracic Echocardiogram  2D, M-mode, Doppler, and Color Doppler    Study date:  2021    Patient: Maximo Paula  MR number: [id number]  Account number: [de-identified]  : 1960  Age: 57 years  Gender: Male  Status: Inpatient  Location: Bedside  Height: 71 in  Weight: 350 lb  BP: 130/ 80 mmHg    Indications: shortness of breath    Diagnoses: 428 0 - CONGESTIVE HEART FAILURE    Sonographer:  Uriel 61, CCT  Primary Physician:  Ko Sue DO  Referring Physician:  Richard Ricks DO  Group:  [facility]  Interpreting Physician:  Brendan Simms DO    SUMMARY    LEFT VENTRICLE:  Systolic function was normal  Ejection fraction was estimated to be 60 %  There were no regional wall motion abnormalities  Wall thickness was mildly increased  RIGHT VENTRICLE:  The ventricle was moderately dilated  Systolic function was mildly reduced  LEFT ATRIUM:  The atrium was mildly dilated  RIGHT ATRIUM:  The atrium was moderately dilated  MITRAL VALVE:  There was mild regurgitation  TRICUSPID VALVE:  There was moderate regurgitation  Estimated peak PA pressure was 52 mmHg  HISTORY: PRIOR HISTORY: CHF, morbid obesity    PROCEDURE: The procedure was performed at the bedside  This was a routine study  The transthoracic approach was used   The study included complete 2D imaging, M-mode, complete spectral Doppler, and color Doppler  The heart rate was 80 bpm,  at the start of the study  Echocardiographic views were limited due to poor patient compliance and poor acoustic window availability  This was a technically difficult study  LEFT VENTRICLE: Size was normal  Systolic function was normal  Ejection fraction was estimated to be 60 %  There were no regional wall motion abnormalities  Wall thickness was mildly increased  DOPPLER: The study was not technically  sufficient to allow evaluation of LV diastolic function  RIGHT VENTRICLE: The ventricle was moderately dilated  Systolic function was mildly reduced  LEFT ATRIUM: The atrium was mildly dilated  RIGHT ATRIUM: The atrium was moderately dilated  MITRAL VALVE: Valve structure was normal  There was normal leaflet separation  DOPPLER: The transmitral velocity was within the normal range  There was no evidence for stenosis  There was mild regurgitation  AORTIC VALVE: The valve was trileaflet  Leaflets exhibited normal thickness and normal cuspal separation  DOPPLER: Transaortic velocity was within the normal range  There was no evidence for stenosis  There was no regurgitation  TRICUSPID VALVE: The valve structure was normal  There was normal leaflet separation  DOPPLER: The transtricuspid velocity was within the normal range  There was no evidence for stenosis  There was moderate regurgitation  Estimated peak PA  pressure was 52 mmHg  PULMONIC VALVE: Leaflets exhibited normal thickness, no calcification, and normal cuspal separation  DOPPLER: The transpulmonic velocity was within the normal range  There was no regurgitation  PERICARDIUM: There was no pericardial effusion  The pericardium was normal in appearance  AORTA: The root exhibited normal size  SYSTEMIC VEINS: IVC: The inferior vena cava was not well visualized      SYSTEM MEASUREMENT TABLES    2D  %FS: 31 45 %  Ao Diam: 3 22 cm  EDV(Teich): 148 86 ml  EF(Teich): 58 74 %  ESV(Teich): 61 42 ml  IVSd: 1 51 cm  LA Area: 20 63 cm2  LA Diam: 5 65 cm  LVIDd: 5 52 cm  LVIDs: 3 79 cm  LVPWd: 1 08 cm  RA Area: 33 86 cm2  RWT: 0 39  SV(Teich): 87 44 ml    CW  RAP: 0 mmHg  TR Vmax: 3 47 m/s  TR maxP 33 mmHg    PW  E' Sept: 0 09 m/s  E/E' Sept: 13 35  MV A Ti: 0 48 m/s  MV Dec [place]: 4 5 m/s2  MV DecT: 253 15 ms  MV E Ti: 1 14 m/s  MV E/A Ratio: 2 36  RVSP: 48 33 mmHg    [facility]    Prepared and electronically signed by    Colt Ryan DO  Signed 2021 08:06:00       No results found for this or any previous visit  No results found for this or any previous visit  No results found for this or any previous visit      Meds/Allergies   all current active meds have been reviewed  Medications Prior to Admission   Medication    carvedilol (COREG) 6 25 mg tablet    digoxin (LANOXIN) 0 25 mg tablet    furosemide (LASIX) 40 mg tablet    metFORMIN (GLUCOPHAGE) 1000 MG tablet    metolazone (ZAROXOLYN) 5 mg tablet    warfarin (COUMADIN) 5 mg tablet    zolpidem (AMBIEN) 10 mg tablet    [DISCONTINUED] omeprazole (PriLOSEC) 40 MG capsule          Assessment:  Principal Problem:    Acute on chronic diastolic CHF (congestive heart failure) (HCC)  Active Problems:    STEFF (obstructive sleep apnea)    Pleural effusion on right    Cirrhosis of liver with ascites (HCC)    CKD (chronic kidney disease) stage 3, GFR 30-59 ml/min    Hypokalemia    Lactic acidosis    Permanent atrial fibrillation (HCC)    Diverticulosis of colon    Lesion of spleen    Type 2 diabetes mellitus with hyperglycemia, without long-term current use of insulin (HCC)    Acute respiratory failure with hypoxia (HCC)    Tobacco use    Hyponatremia    Constipation    Pulmonary hypertension (HCC)    Vitamin D deficiency    Atelectasis    Anemia    Hypercalcemia

## 2021-03-11 NOTE — ASSESSMENT & PLAN NOTE
· Initially required 4 lpm of continuous supplemental oxygen via the nasal cannula to maintain oxygen saturations at 92% and above  · Successfully weaned off supplemental oxygen at this time  · Secondary to right-sided pleural effusion  · The patient did not require any supplemental oxygen with normal oxygen saturation levels on room air on the day of discharge  · Incentive spirometry 10 times per hour while awake was utilized during the hospitalization

## 2021-03-11 NOTE — ASSESSMENT & PLAN NOTE
Lab Results   Component Value Date    EGFR 47 03/11/2021    EGFR 45 03/10/2021    EGFR 45 03/09/2021    CREATININE 1 57 (H) 03/11/2021    CREATININE 1 61 (H) 03/10/2021    CREATININE 1 60 (H) 03/09/2021     · Baseline serum creatinine of 1 3-1 6 mg/dl  · Avoid all nephrotoxic agents  · Monitor the patient's renal function and electrolyte levels after discharge with his PCP and with Nephrology  · The patient was seen in consultation by Nephrology and will need continued outpatient follow-up with Nephrology

## 2021-03-12 NOTE — CASE MANAGEMENT
Late Note 3/11/21 Arranged for Cardiac rehab at [facility] on 4/13/21    Called Patient and notified him of same

## 2021-03-15 PROBLEM — N18.9 CHRONIC KIDNEY DISEASE-MINERAL AND BONE DISORDER: Status: ACTIVE | Noted: 2021-01-01

## 2021-03-15 PROBLEM — N17.9 AKI (ACUTE KIDNEY INJURY) (HCC): Status: ACTIVE | Noted: 2021-01-01

## 2021-03-15 PROBLEM — M89.9 CHRONIC KIDNEY DISEASE-MINERAL AND BONE DISORDER: Status: ACTIVE | Noted: 2021-01-01

## 2021-03-15 PROBLEM — E83.9 CHRONIC KIDNEY DISEASE-MINERAL AND BONE DISORDER: Status: ACTIVE | Noted: 2021-01-01

## 2021-03-15 PROBLEM — I12.9 HYPERTENSIVE NEPHROSCLEROSIS: Status: ACTIVE | Noted: 2021-01-01

## 2021-03-15 PROBLEM — N18.32 STAGE 3B CHRONIC KIDNEY DISEASE (HCC): Status: ACTIVE | Noted: 2021-01-01

## 2021-03-15 NOTE — PATIENT INSTRUCTIONS
Continue to weigh yourself daily  Get blood work by Wednesday  Do your best with fluid restriction (no more than 64 ounces per day)  Low sodium diet, less than 2,000 mg per day  Tylenol is okay for pain  Ask the pharmacist about BIOTENE or Mouth Kote for oral dryness

## 2021-03-15 NOTE — PROGRESS NOTES
Nephrology   Office Follow-Up  Allison Hale 57 y o  male MRN: [id number]    Encounter: [id number]        Tri was seen in the [facility] office today  All diagnoses and orders for visit:     1  MARISABEL (acute kidney injury) ([address] )  · No updated blood work to review  Due to renovascular congestion/decongestion  Will likely need higher creatinine for euvolemia  2  Stage 3b chronic kidney disease  · Record review indicates previous baseline sCr around 1 3 mg/dL  As mentioned above, his new baseline creatinine will likely be around 1 6 mg/dL for euvolemia  · Continue diuresis per cardiology's recommendation and avoid nephrotoxins, NSAIDs  RTO 3 months with updated blood work and urine studies     -     CBC and differential; Future; Expected date: 06/01/2021   -     Comprehensive metabolic panel; Future; Expected date: 06/01/2021   -     Phosphorus; Future; Expected date: 06/01/2021   -     Urinalysis with microscopic; Future; Expected date: 06/01/2021   -     Microalbumin / creatinine urine ratio; Future; Expected date: 06/01/2021  3  Chronic diastolic (congestive) heart failure (HCC)  · S/p massive diuresis (around 70 pounds) in acute care now managed on lasix 80 mg BID  Weights are stable  Continue sodium and fluid restricted diet  Labs to be done this week prior to cardiology appointment  4  Hypertensive nephrosclerosis, stage 1 through stage 4 or unspecified chronic kidney disease  · Blood pressure appropriate at this time  Ace/arb precluded until renal function in steady state then can consider adding   5  Chronic kidney disease-mineral and bone disorder  · Start on ergocalciferol weekly  · -     ergocalciferol (ERGOCALCIFEROL) 1 25 MG (38390 UT) capsule;  Take 1 capsule (50,000 Units total) by mouth once a week    HPI: Allison Hale is a 57 y o  male with an pertinent problem list significant for CKD 3, hypertension, chronic diastolic congestive heart failure, atrial fibrillation on coumadin, T2DM, cirrhosis, recurrent pleural effusion, obesity, who is here for hospital follow-up  Mr Price Novak was hospitalized at [facility] from 2/22-3/11/21 for acute on chronic diastolic heart failure s/p massive diuresis (roughly 70 pounds)  Renal followed for MARISABEL on CKD (presented with sCr 1 5 mg/dL -> peaked at 1 8 mg/dL -> discharge sCr 1 57 mg/dL)  No updated blood work to review  Upon discussion with Tyson Ahumada, he is doing well  He has returned to work- works as a  in [employer]  He has some body aches for which he takes ASA  He weighs himself daily  Biggest complaint is fluid restriction/thirst and we discussed strategies for this  unfortunately he has not been taking potassium supplement or carvedilol as ordered and these will be sent to his pharmacy today  He will have blood work done by Wednesday  The medical record, including Care Everywhere and media tabs were reviewed  ROS:   Review of Systems   Constitutional: Negative  HENT: Negative  Eyes: Negative  Respiratory: Negative  Cardiovascular: Negative  Gastrointestinal: Negative  Dry mouth   Endocrine: Negative  Genitourinary: Negative  Musculoskeletal: Negative  Skin: Negative  Allergic/Immunologic: Negative  Neurological: Negative  Hematological: Negative  Psychiatric/Behavioral: Negative  All other systems reviewed and are negative  Allergies: Patient has no known allergies      Medications:   Current Outpatient Medications:     carvedilol (COREG) 6 25 mg tablet, Take 1 tablet (6 25 mg total) by mouth 2 (two) times a day with meals, Disp: 180 tablet, Rfl: 3    docusate sodium (COLACE) 100 mg capsule, Take 1 capsule (100 mg total) by mouth 2 (two) times a day as needed for constipation, Disp: , Rfl: 0    furosemide (LASIX) 80 mg tablet, Take 1 tablet (80 mg total) by mouth 2 (two) times a day, Disp: 90 tablet, Rfl: 3    warfarin (COUMADIN) 5 mg tablet, Take 1/2 to 1 tablet by oral route daily  , Disp: 135 tablet, Rfl: 3    ergocalciferol (ERGOCALCIFEROL) 1 25 MG (47994 UT) capsule, Take 1 capsule (50,000 Units total) by mouth once a week, Disp: 12 capsule, Rfl: 4    omeprazole (PriLOSEC) 20 mg delayed release capsule, Take 1 capsule (20 mg total) by mouth 2 (two) times a day (Patient not taking: Reported on 3/15/2021), Disp: , Rfl:     polyethylene glycol (MIRALAX) 17 g packet, Take 17 g by mouth daily as needed (constipation) (Patient not taking: Reported on 3/15/2021), Disp: , Rfl: 0    potassium chloride (K-DUR,KLOR-CON) 10 mEq tablet, Take 2 tablets (20 mEq total) by mouth 2 (two) times a day Take with lasix dosing, Disp: 360 tablet, Rfl: 2    Past Medical History:   Diagnosis Date    A-fib [facility])     Cardiac disease     Chronic combined systolic (congestive) and diastolic (congestive) heart failure ([address] )     Diabetes mellitus ([address] )     Dilated cardiomyopathy ([address] )     History of echocardiogram 11/24/2014    EF 0 30 (30%), Likely mod LV systolic dysfunction  Likely RV dysfunction as well   History of Lexiscan MPI 02/19/2016    EF 0 43 (43%), no prior MI or ischemia   Hx of echocardiogram 04/28/2017    Normal EF, Normal LV systolic function  Mild concentric LV hypertrophy  Mild mitral and tricuspid regurg   Hx: recurrent pneumonia     Hypertension     Long term (current) use of anticoagulants     Morbid (severe) obesity due to excess calories (HCC)     Neuropathy      Past Surgical History:   Procedure Laterality Date    HERNIA REPAIR      IR THORACENTESIS  8/25/2020    IR THORACENTESIS  2/23/2021    SPLENECTOMY, TOTAL      VASCULAR SURGERY Right     leg     History reviewed  No pertinent family history  reports that he has been smoking cigarettes  He has been smoking about 0 25 packs per day  He has never used smokeless tobacco  He reports current alcohol use of about 14 0 standard drinks of alcohol per week   He reports that he does not use drugs       Physical Exam:   Vitals:    03/15/21 0826   BP: 140/80   Pulse: 75   SpO2: 99%   Weight: 135 kg (297 lb)   Height: 5' 11" (1 803 m)     Body mass index is 41 42 kg/m²  General: conscious, cooperative, in no acute distress, appears stated age  Eyes: conjunctivae pale, anicteric sclerae  ENT: lips and mucous membranes moist  Neck: supple, no JVD, no masses  Chest:  essentially clear breath sounds bilaterally, no crackles, ronchus or wheezings  CVS: S1 & S2, normal rate, regular rhythm  Abdomen: soft, non-tender, non-distended, normoactive bowel sounds, rounded  Extremities: moderate lymphedema of both legs  Skin: no rash   Neuro: awake, alert, oriented       Diagnostic Data:  Lab: I have personally reviewed pertinent lab results  ,   CBC:  Results from last 7 days   Lab Units 03/11/21  0435   WBC Thousand/uL 5 14   HEMOGLOBIN g/dL 9 6*   HEMATOCRIT % 32 2*   PLATELETS Thousands/uL 193      CMP: No results found for: SODIUM, K, CL, CO2, ANIONGAP, BUN, CREATININE, GLUCOSE, CALCIUM, AST, ALT, ALKPHOS, PROT, BILITOT, EGFR,   PT/INR: No results found for: PT, INR,   Magnesium: No components found for: MAG,  Phosphorous: No results found for: PHOS    Patient Instructions   Continue to weigh yourself daily  Get blood work by Wednesday  Do your best with fluid restriction (no more than 64 ounces per day)  Low sodium diet, less than 2,000 mg per day  Tylenol is okay for pain  Ask the pharmacist about BIOTENE or Mouth Kote for oral dryness      Portions of the record may have been created with voice recognition software  Occasional wrong word or "sound a like" substitutions may have occurred due to the inherent limitations of voice recognition software  Read the chart carefully and recognize, using context, where substitutions have occurred  If you have any questions, please contact the dictating provider

## 2021-03-18 NOTE — PROGRESS NOTES
[place] [address]  coding oppertunities             Chart reviewed, (number of) suggestions sent to provider: 6     Problem listed updated   Provider Accepted, (number of) suggestions accepted: 6              [place] [address]  coding oppertunities             Chart reviewed, (number of) suggestions sent to provider: 6                 E11 22, N18 31 T2DM with diabetic stage 3a CKD ([place] [address] )    I27 20 Pulmonary hypertension    K74 60, R18 8 Cirrhosis of liver with ascites ([place] [address] )    I13 9, I50 32, N18 31 Hypertensive heart with stage 3a CKD and Chronic diastolic congestive heart failure ([place] [address] )     If this is correct, please document and assess at your next visit 3/25/21

## 2021-03-19 PROBLEM — E11.22 TYPE 2 DIABETES MELLITUS WITH DIABETIC CHRONIC KIDNEY DISEASE (HCC): Status: ACTIVE | Noted: 2018-10-17

## 2021-03-19 PROBLEM — I13.0 HYPERTENSIVE HEART AND CHRONIC KIDNEY DISEASE WITH HEART FAILURE AND STAGE 1 THROUGH STAGE 4 CHRONIC KIDNEY DISEASE, OR CHRONIC KIDNEY DISEASE (HCC): Status: ACTIVE | Noted: 2019-10-16

## 2021-03-24 NOTE — TELEPHONE ENCOUNTER
----- Message from De Velazco, [address] sent at 3/23/2021  7:49 PM EDT -----  Please let Yang Epperson know kidney function is stable and around 50%  Potassium is low - please ask him how much potassium he is taking per day and let me know

## 2021-03-25 NOTE — PROGRESS NOTES
Sheba Dannielle 57 y o  male   Date:  3/25/2021    TCM Call (since 2/22/2021)     Date and time call was made  3/15/2021 10:49 AM    Hospital care reviewed  Records reviewed    Patient was hospitialized at  [address] [place] [address]        Date of Admission  02/22/21    Date of discharge  03/11/21    Diagnosis  A/C CHF    Disposition  Home    Were the patients medications reviewed and updated  -- (Comment)  STOP - lanoxin 0 25mg, metformin 1000mg, zaroxlyn 5mg, and ambien 10mg START - vit d 2000mg qd, colace 100mg BID, miralax 17g qd, and potassium choloride 20meq BID - CHANGE - lasix 80 BID and omeprazole 20mg BID    Current Symptoms  None (Comment)  feeling much better      TCM Call (since 2/22/2021)     Post hospital issues  None    Should patient be enrolled in anticoag monitoring? Yes (Comment)  mamaged by cardiology    Scheduled for follow up? Yes (Comment)  03/25/2021 @11:15am    Patients specialists  Cardiologist; Pulmonlolgist; Other (comment);  Nephrologist    Cardiologist name  Dr Eunice Mercedes 03/18/2021 @ 9:20am - pushed back to the 26th    Pulmonologist name  Wendie Gotti 04/07/2021@ 9:20am    Nephrologist name  Dr Makenzie Díaz - was seen today by PA (03/15/2021)    Other specialists names  Sleep Medicine 04/01/2021 @ 11:30am    Did you obtain your prescribed medications  No    Why were you unable to obtain your medications  was not sent to pharmacy by hospitalist    Do you need help managing your prescriptions or medications  No    Is transportation to your appointment needed  No    I have advised the patient to call PCP with any new or worsening symptoms  sree CELESTE    Living Arrangements  Spouse or Significiant other    Support System  Spouse    The type of support provided  Emotional; Physical    Are you recieving any outpatient services  No    Are you recieving home care services  No    Are you using any community resources  No    Current waiver services  No    Have you fallen in the last 12 months  No    Interperter language line needed  No    Counseling  Patient    Comments  patient has no concerns at this time        Hospital records were reviewed  Medications upon discharge reviewed/updated  Discharge Disposition:    Follow up visits with other specialists:       Assessment and Plan: post thoracentesis for right pleural effusion which was found to be a transient date  Acute on chronic congestive heart failure post vigorous diuresis diuresed of approximately 50 lb of fluid  Cirrhosis of the liver with ascites    Diabetes mellitus type 2 last blood sugar was 206 was nonfasting however the patient is on no diabetic medication at this time secondary to cirrhosis discussed with the patient that we will need to start insulin of his sugar continues to be elevated  Renal insufficiency with diabetes mellitus will begin lisinopril at 5 mg    Iron deficiency anemia with 3 heme-negative stool checks will add an iron supplement and recheck count in 1 month the patient will be seeing Gastroenterology as an outpatient    Tobacco use disorder the patient is now with a nicotine patch    Hospitalization has been reviewed discharge orders reviewed and am in agreement will follow through on checking a comprehensive metabolic panel and repeat blood count in 1 month  Jocelyn Osborn was seen today for transition of care management  Diagnoses and all orders for this visit:    Type 2 diabetes mellitus with other specified complication, without long-term current use of insulin (HCC)  -     lisinopril (ZESTRIL) 5 mg tablet; Take 1 tablet (5 mg total) by mouth daily    Thrombocytopenia, unspecified (HCC)            HPI:   Patient presents for transition of care hospitalized at [facility] from February 22nd through March 11th with diagnosis of congestive heart failure hypoxia and right pleural effusion      ROS: Review of Systems   Respiratory: Positive for shortness of breath      Cardiovascular: Positive for leg swelling  Gastrointestinal: Positive for abdominal distention  Past Medical History:   Diagnosis Date    A-fib [facility])     Cardiac disease     Chronic combined systolic (congestive) and diastolic (congestive) heart failure (HCC)     Diabetes mellitus ([facility] )     Dilated cardiomyopathy ([facility] )     History of echocardiogram 11/24/2014    EF 0 30 (30%), Likely mod LV systolic dysfunction  Likely RV dysfunction as well   History of Lexiscan MPI 02/19/2016    EF 0 43 (43%), no prior MI or ischemia   Hx of echocardiogram 04/28/2017    Normal EF, Normal LV systolic function  Mild concentric LV hypertrophy  Mild mitral and tricuspid regurg   Hx: recurrent pneumonia     Hypertension     Long term (current) use of anticoagulants     Morbid (severe) obesity due to excess calories (HCC)     Neuropathy        Past Surgical History:   Procedure Laterality Date    HERNIA REPAIR      IR THORACENTESIS  8/25/2020    IR THORACENTESIS  2/23/2021    SPLENECTOMY, TOTAL      VASCULAR SURGERY Right     leg       Social History     Socioeconomic History    Marital status: /Civil Union     Spouse name: None    Number of children: None    Years of education: None    Highest education level: None   Occupational History    None   Social Needs    Financial resource strain: None    Food insecurity     Worry: None     Inability: None    Transportation needs     Medical: None     Non-medical: None   Tobacco Use    Smoking status: Current Some Day Smoker     Packs/day: 0 25     Types: Cigarettes    Smokeless tobacco: Never Used    Tobacco comment: 2-3 CIGARETTES DAILY   Substance and Sexual Activity    Alcohol use:  Yes     Alcohol/week: 14 0 standard drinks     Types: 5 Glasses of wine, 5 Cans of beer, 4 Shots of liquor per week     Frequency: 2-3 times a week     Drinks per session: 5 or 6     Binge frequency: Never     Comment: WEEKLY    Drug use: Never    Sexual activity: Yes   Lifestyle    Physical activity     Days per week: None     Minutes per session: None    Stress: None   Relationships    Social connections     Talks on phone: None     Gets together: None     Attends Tenriism service: None     Active member of club or organization: None     Attends meetings of clubs or organizations: None     Relationship status: None    Intimate partner violence     Fear of current or ex partner: None     Emotionally abused: None     Physically abused: None     Forced sexual activity: None   Other Topics Concern    None   Social History Narrative    None       No family history on file  No Known Allergies      Current Outpatient Medications:     carvedilol (COREG) 6 25 mg tablet, Take 1 tablet (6 25 mg total) by mouth 2 (two) times a day with meals, Disp: 180 tablet, Rfl: 3    docusate sodium (COLACE) 100 mg capsule, Take 1 capsule (100 mg total) by mouth 2 (two) times a day as needed for constipation, Disp: , Rfl: 0    ergocalciferol (ERGOCALCIFEROL) 1 25 MG (61105 UT) capsule, Take 1 capsule (50,000 Units total) by mouth once a week, Disp: 12 capsule, Rfl: 4    furosemide (LASIX) 80 mg tablet, Take 1 tablet (80 mg total) by mouth 2 (two) times a day, Disp: 90 tablet, Rfl: 3    lisinopril (ZESTRIL) 5 mg tablet, Take 1 tablet (5 mg total) by mouth daily, Disp: 90 tablet, Rfl: 3    omeprazole (PriLOSEC) 20 mg delayed release capsule, Take 1 capsule (20 mg total) by mouth 2 (two) times a day (Patient not taking: Reported on 3/15/2021), Disp: , Rfl:     polyethylene glycol (MIRALAX) 17 g packet, Take 17 g by mouth daily as needed (constipation) (Patient not taking: Reported on 3/15/2021), Disp: , Rfl: 0    potassium chloride (K-DUR,KLOR-CON) 10 mEq tablet, Take 2 tablets (20 mEq total) by mouth 2 (two) times a day Take with lasix dosing, Disp: 360 tablet, Rfl: 2    warfarin (COUMADIN) 5 mg tablet, Take 1/2 to 1 tablet by oral route daily  , Disp: 135 tablet, Rfl: 3      Physical Exam:  /74   Pulse 84   Temp (!) 97 4 °F (36 3 °C)   Resp 20   Ht 5' 11" (1 803 m)   Wt 132 kg (291 lb)   BMI 40 59 kg/m²     Physical Exam  Constitutional:       Appearance: He is well-developed  HENT:      Head: Normocephalic  Eyes:      Pupils: Pupils are equal, round, and reactive to light  Neck:      Musculoskeletal: Normal range of motion  Cardiovascular:      Rate and Rhythm: Normal rate  Rhythm irregular  Heart sounds: Murmur present  Pulmonary:      Effort: Pulmonary effort is normal       Breath sounds: Rhonchi present  Abdominal:      General: Bowel sounds are normal  There is distension  Palpations: Abdomen is soft  Tenderness: There is no abdominal tenderness  Musculoskeletal:      Right lower leg: Edema present  Left lower leg: Edema present  Skin:     General: Skin is warm  Neurological:      Mental Status: He is alert and oriented to person, place, and time               Labs:  Lab Results   Component Value Date    WBC 6 06 03/23/2021    HGB 9 4 (L) 03/23/2021    HCT 31 3 (L) 03/23/2021    MCV 91 03/23/2021     03/23/2021     Lab Results   Component Value Date     12/16/2015    K 3 1 (L) 03/23/2021    CL 95 (L) 03/23/2021    CO2 36 (H) 03/23/2021    ANIONGAP 11 12/16/2015    BUN 33 (H) 03/23/2021    CREATININE 1 46 (H) 03/23/2021    GLUCOSE 188 (H) 12/16/2015    GLUF 130 (H) 01/05/2021    CALCIUM 8 9 03/23/2021    CORRECTEDCA 9 7 03/23/2021    AST 22 03/23/2021    ALT 21 03/23/2021    ALKPHOS 135 (H) 03/23/2021    PROT 7 6 12/16/2015    BILITOT 1 03 (H) 12/16/2015    EGFR 51 03/23/2021

## 2021-03-25 NOTE — PROGRESS NOTES
TCM Call (since 2/22/2021)     Date and time call was made  3/15/2021 10:49 AM    Hospital care reviewed  Records reviewed    Patient was hospitialized at  [address]        Date of Admission  02/22/21    Date of discharge  03/11/21    Diagnosis  A/C CHF    Disposition  Home    Were the patients medications reviewed and updated  -- (Comment)  STOP - lanoxin 0 25mg, metformin 1000mg, zaroxlyn 5mg, and ambien 10mg START - vit d 2000mg qd, colace 100mg BID, miralax 17g qd, and potassium choloride 20meq BID - CHANGE - lasix 80 BID and omeprazole 20mg BID    Current Symptoms  None (Comment)  feeling much better      TCM Call (since 2/22/2021)     Post hospital issues  None    Should patient be enrolled in anticoag monitoring? Yes (Comment)  mamaged by cardiology    Scheduled for follow up? Yes (Comment)  03/25/2021 @11:15am    Patients specialists  Cardiologist; Pulmonlolgist; Other (comment);  Nephrologist    Cardiologist name  Dr Naty Griffin 03/18/2021 @ 9:20am - pushed back to the 26th    Pulmonologist name  Tisha Luciano 04/07/2021@ 9:20am    Nephrologist name  Dr José Miguel Barbosa - was seen today by PA (03/15/2021)    Other specialists names  Sleep Medicine 04/01/2021 @ 11:30am    Did you obtain your prescribed medications  No    Why were you unable to obtain your medications  was not sent to pharmacy by hospitalist    Do you need help managing your prescriptions or medications  No    Is transportation to your appointment needed  No    I have advised the patient to call PCP with any new or worsening symptoms  sree [facility]    Living Arrangements  Spouse or Significiant other    Support System  Spouse    The type of support provided  Emotional; Physical    Are you recieving any outpatient services  No    Are you recieving home care services  No    Are you using any community resources  No    Current waiver services  No    Have you fallen in the last 12 months  No    Interperter language line needed  No Counseling  Patient    Comments  patient has no concerns at this time

## 2021-04-01 NOTE — PROGRESS NOTES
Subjective:        Patient ID: Miguelito Gomez is a 57 y o  male  Chief Complaint:  Joana Tan is down about 60 lb since February, after I saw him last he said he put on like 35-40 lb in fluid  One above the hospital, records reviewed, he has been compliant with his fluid restriction, sodium restriction, as well as calorie restriction and appears to have turned to Leaf  He is taking his Lasix twice a day and metolazone twice a week  He increase his potassium your direction, is getting follow-up blood work next week  Offers no complaints seeing feels much better less dyspnea on exertion more mobile, less edema  He is going to get his compression boots back on when he gets his new recliner  The following portions of the patient's history were reviewed and updated as appropriate: allergies, current medications, past family history, past medical history, past social history, past surgical history and problem list   Review of Systems   Constitution: Negative for chills, diaphoresis, malaise/fatigue and weight gain  HENT: Negative for nosebleeds and stridor  Eyes: Negative for double vision, vision loss in left eye, vision loss in right eye and visual disturbance  Cardiovascular: Negative for chest pain, claudication, cyanosis, dyspnea on exertion, irregular heartbeat, leg swelling, near-syncope, orthopnea, palpitations, paroxysmal nocturnal dyspnea and syncope  Respiratory: Negative for cough, shortness of breath, snoring and wheezing  Endocrine: Negative for polydipsia, polyphagia and polyuria  Hematologic/Lymphatic: Negative for bleeding problem  Does not bruise/bleed easily  Skin: Negative for flushing and rash  Musculoskeletal: Negative for falls and myalgias  Gastrointestinal: Negative for abdominal pain, heartburn, hematemesis, hematochezia, melena and nausea  Genitourinary: Negative for hematuria     Neurological: Negative for brief paralysis, dizziness, focal weakness, headaches, light-headedness, loss of balance and vertigo  Psychiatric/Behavioral: Negative for altered mental status and substance abuse  Allergic/Immunologic: Negative for hives  Objective:      /70   Pulse 90   Ht 5' 11" (1 803 m)   Wt 130 kg (287 lb)   BMI 40 03 kg/m²   Physical Exam   Constitutional: He is oriented to person, place, and time  He appears well-developed and well-nourished  No distress  HENT:   Head: Normocephalic and atraumatic  Eyes: Pupils are equal, round, and reactive to light  EOM are normal  No scleral icterus  Neck: Normal range of motion  Neck supple  No JVD present  No thyromegaly present  Cardiovascular: Normal rate, regular rhythm and normal heart sounds  Exam reveals no gallop and no friction rub  No murmur heard  Pulmonary/Chest: Effort normal and breath sounds normal  No stridor  No respiratory distress  He has no wheezes  He has no rales  Abdominal: Soft  Bowel sounds are normal  He exhibits no distension and no mass  There is no abdominal tenderness  Musculoskeletal: Normal range of motion  General: No deformity or edema  Neurological: He is alert and oriented to person, place, and time  Coordination normal    Skin: Skin is warm and dry  No erythema  No pallor  Psychiatric: He has a normal mood and affect   His behavior is normal        Lab Review:   Anticoag visit on 04/01/2021   Component Date Value    POCT INR 04/01/2021 3 5    Appointment on 03/23/2021   Component Date Value    WBC 03/23/2021 6 06     RBC 03/23/2021 3 45*    Hemoglobin 03/23/2021 9 4*    Hematocrit 03/23/2021 31 3*    MCV 03/23/2021 91     MCH 03/23/2021 27 2     MCHC 03/23/2021 30 0*    RDW 03/23/2021 17 2*    MPV 03/23/2021 11 1     Platelets 59/06/5411 164     nRBC 03/23/2021 0     Neutrophils Relative 03/23/2021 75     Immat GRANS % 03/23/2021 1     Lymphocytes Relative 03/23/2021 11*    Monocytes Relative 03/23/2021 8     Eosinophils Relative 03/23/2021 4     Basophils Relative 03/23/2021 1     Neutrophils Absolute 03/23/2021 4 68     Immature Grans Absolute 03/23/2021 0 03     Lymphocytes Absolute 03/23/2021 0 64     Monocytes Absolute 03/23/2021 0 46     Eosinophils Absolute 03/23/2021 0 21     Basophils Absolute 03/23/2021 0 04     Sodium 03/23/2021 138     Potassium 03/23/2021 3 1*    Chloride 03/23/2021 95*    CO2 03/23/2021 36*    ANION GAP 03/23/2021 7     BUN 03/23/2021 33*    Creatinine 03/23/2021 1 46*    Glucose 03/23/2021 204*    Calcium 03/23/2021 8 9     Corrected Calcium 03/23/2021 9 7     AST 03/23/2021 22     ALT 03/23/2021 21     Alkaline Phosphatase 03/23/2021 135*    Total Protein 03/23/2021 8 4*    Albumin 03/23/2021 3 0*    Total Bilirubin 03/23/2021 1 00     eGFR 03/23/2021 51     Phosphorus 03/23/2021 3 7     Clarity, UA 03/23/2021 Clear     Color, UA 03/23/2021 Orange     Specific Gravity, UA 03/23/2021 1 015     pH, UA 03/23/2021 6 0     Glucose, UA 03/23/2021 Negative     Ketones, UA 03/23/2021 Negative     Blood, UA 03/23/2021 Negative     Protein, UA 03/23/2021 Negative     Nitrite, UA 03/23/2021 Negative     Bilirubin, UA 03/23/2021 Negative     Urobilinogen, UA 03/23/2021 0 2     Leukocytes, UA 03/23/2021 Negative     WBC, UA 03/23/2021 None Seen     RBC, UA 03/23/2021 None Seen     Bacteria, UA 03/23/2021 None Seen     Epithelial Cells 03/23/2021 Occasional     Creatinine, Ur 03/23/2021 126 0     Microalbum  ,U,Random 03/23/2021 8 0     Microalb Creat Ratio 03/23/2021 6    No results displayed because visit has over 200 results        Anticoag visit on 02/10/2021   Component Date Value    POCT INR 02/10/2021 1 8      Atrial fibrillation  Low voltage QRS  Incomplete right bundle branch block  Abnormal ECG  When compared with ECG of 21-AUG-2020 11:38,  No significant change was found  Confirmed by Sydnie Naranjo ([id number]) on 2/23/2021 9:06:10 AM    Transthoracic Echocardiogram  2D, M-mode, Doppler, and Color Doppler     Study date:  2021     Patient: Arpan Flores  MR number: [id number]  Account number: [de-identified]  : 1960  Age: 57 years  Gender: Male  Status: Inpatient  Location: Bedside  Height: 71 in  Weight: 350 lb  BP: 130/ 80 mmHg     Indications: shortness of breath     Diagnoses: 428 0 - CONGESTIVE HEART FAILURE     Sonographer:  LATASHA Alcocer  Primary Physician:  Yasmin Yoo DO  Referring Physician:  Gerhardt Radish, DO  Group:  [facility]  Interpreting Physician:  Tommy Bush DO     SUMMARY     LEFT VENTRICLE:  Systolic function was normal  Ejection fraction was estimated to be 60 %  There were no regional wall motion abnormalities  Wall thickness was mildly increased      RIGHT VENTRICLE:  The ventricle was moderately dilated  Systolic function was mildly reduced      LEFT ATRIUM:  The atrium was mildly dilated      RIGHT ATRIUM:  The atrium was moderately dilated      MITRAL VALVE:  There was mild regurgitation      TRICUSPID VALVE:  There was moderate regurgitation  Estimated peak PA pressure was 52 mmHg  Assessment:       1  Chronic diastolic congestive heart failure (HCC)  metolazone (ZAROXOLYN) 5 mg tablet   2  Hypokalemia  potassium chloride (K-DUR,KLOR-CON) 20 mEq tablet    DISCONTINUED: potassium chloride (K-DUR,KLOR-CON) 10 mEq tablet   3  STEFF (obstructive sleep apnea)     4  Permanent atrial fibrillation ([address] )          Plan:       I told Joana Tan I was proud of him for taking better care of himself since he was in the hospital     I think his current diuretic regimen is optimal, we will see how his BMP returns on follow-up  I stressed the importance of CPAP use, he will try and use of more he says  I explained to him the relationship between this and pulmonary hypertension, lower extremity edema, probable cirrhosis all also complicated by heart failure      I think his present cardiac regimen is optimal as his heart rate is well controlled, blood pressure stable, and his weight continues to fall, he is down another 19 lb or so since discharge  He knows to continue a fluid restriction, sodium restriction, calorie restriction and keep an eye on his weight every day  I will see him back in 3 months, he will call if his weight starts to rise or should he develop any concerning dyspnea palpitations or chest pains

## 2021-04-01 NOTE — PATIENT INSTRUCTIONS
No changes in medication health or diet  No missed or extra doses  No s/s of bleeding TIA or CVA  No new ABX, NSAIDs OCT meds, or supplements  Dose as instructed and recheck in two weeks     Donta Norman PA-C

## 2021-04-01 NOTE — PATIENT INSTRUCTIONS
Heart Failure   AMBULATORY CARE:   Heart failure (HF) is a condition that does not allow your heart to fill or pump properly  Not enough oxygen in your blood gets to your organs and tissues  Fluid may not move through your body properly  Fluid builds up and causes swelling and difficulty breathing  This is known as congestive heart failure  HF may start in the left or right ventricle  HF is often caused by damage or injury to your heart  The damage may be caused by other heart problems, diabetes, or high blood pressure  The damage may have also been caused by an infection  HF is a long-term condition that tends to get worse over time  It is important to manage your health to improve your quality of life  Common signs and symptoms:   · Difficulty breathing with activity that worsens to difficulty breathing at rest    · Shortness of breath while lying flat    · Severe shortness of breath and coughing at night that usually wakes you    · Chest pain at night    · Periods of no breathing, then breathing fast    · Fatigue or lack of energy (often worsened by physical activity)    · Swelling in your ankles, legs, or abdomen    · Fast heartbeat, purple color around your mouth and nails    · Fingers and toes cool to the touch    Call your local emergency number (911 in the [address] [facility]) if:   · You have any of the following signs of a heart attack:      ? Squeezing, pressure, or pain in your chest    ? You may  also have any of the following:     § Discomfort or pain in your back, neck, jaw, stomach, or arm    § Shortness of breath    § Nausea or vomiting    § Lightheadedness or a sudden cold sweat      Call your doctor if:   · Your heartbeat is fast, slow, or uneven all the time  · You have symptoms of worsening HF:      ? Shortness of breath at rest, at night, or that is getting worse in any way    ? Weight gain of 3 or more pounds (1 4 kg) in a day, or more than your healthcare provider says is okay    ?  More swelling in your legs or ankles    ? Abdominal pain or swelling    ? More coughing    ? Loss of appetite    ? Feeling tired all the time    · You feel hopeless or depressed, or you have lost interest in things you used to enjoy  · You often feel worried or afraid  · You have questions or concerns about your condition or care  Treatment for HF  may include any of the following:  · Medicines  may be needed to help regulate your heart rhythm  You may also need medicines to lower your blood pressure, and to decrease extra fluids  · Oxygen  may help you breathe easier if your oxygen level is lower than normal  A CPAP machine may be used to keep your airway open while you sleep  · Cardiac rehab  is a program run by specialists who will help you safely strengthen your heart  In the program you will learn about exercise, relaxation, stress management, and heart-healthy nutrition  Cardiac rehab may be recommended if your HF is not severe  · Surgery  can be done to implant a pacemaker or another device in your chest to regulate your heart rhythm  Other types of surgery can open blocked heart vessels, replace a damaged heart valve, or remove scar tissue  Manage or prevent HF:  Your quality of life may improve with treatment and the following:  · Do not smoke  Nicotine and other chemicals in cigarettes and cigars can cause lung and heart damage  Ask your healthcare provider for information if you currently smoke and need help to quit  E-cigarettes or smokeless tobacco still contain nicotine  Talk to your healthcare provider before you use these products  · Do not drink alcohol or use illegal drugs  Alcohol and drugs can increase your risk for high blood pressure, diabetes, and coronary artery disease  · Eat heart-healthy foods and limit sodium (salt)  Eat more fresh fruits and vegetables  Eat fewer canned and processed foods   Replace butter and margarine with heart-healthy oils such as olive oil and canola oil  Other heart-healthy foods include walnuts, whole-grain breads, low-fat dairy products, beans, and lean meats  Fatty fish such as salmon and tuna are also heart healthy  Ask how much salt you can eat each day  · Manage any chronic health conditions you have  These include high blood pressure, diabetes, obesity, high cholesterol, metabolic syndrome, and COPD  You will have fewer symptoms if you manage these health conditions  Follow your healthcare provider's recommendations and follow up with him or her regularly  · Drink liquids as directed  You may need to limit the amount of liquids you drink if you have fluid buildup  Ask how much liquid to drink each day and which liquids are best for you  · Maintain a healthy weight  Being overweight can increase your risk for high blood pressure, diabetes, and coronary artery disease  These conditions can make your symptoms worse  Ask your healthcare provider how much you should weigh  Ask him or her to help you create a weight loss plan if you are overweight  · Stay active  Activity can help keep your symptoms from getting worse  Walking is a type of physical activity that helps maintain your strength and improve your mood  Physical activity also helps you manage your weight  Work with your healthcare provider to create an exercise plan that is right for you  · Weigh yourself every morning  Use the same scale, in the same spot  Do this after you use the bathroom, but before you eat or drink  Wear the same type of clothing each time  Write down your weight and call your healthcare provider if you have a sudden weight gain  Swelling and weight gain are signs of fluid buildup  · Get vaccines as directed  Get a flu shot every year  You may also need the pneumonia vaccine  The flu and pneumonia can be severe for a person who has HF  Vaccines protect you from these infections  Join a support group:  HF can be difficult to manage   It may be helpful to talk with others who have HF  You may learn how to better manage your condition or get emotional support  For more information:  · [address]  [place] , [place]   Phone: [phone]  Web Address: https://[employer]     Follow up with your doctor or cardiologist within 7 days or as directed: You may need to return for other tests  You may need home health care  A healthcare provider will monitor your vital signs, weight, and make sure your medicines are working  Write down your questions so you remember to ask them during your visits  © Copyright [address] Information is for End User's use only and may not be sold, redistributed or otherwise used for commercial purposes  All illustrations and images included in CareNotes® are the copyrighted property of A D A M , Inc  or [facility] Rosalind Radford   The above information is an  only  It is not intended as medical advice for individual conditions or treatments  Talk to your doctor, nurse or pharmacist before following any medical regimen to see if it is safe and effective for you

## 2021-04-06 NOTE — PROGRESS NOTES
[place] [address]  coding oppertunities             Chart reviewed, (number of) suggestions sent to provider: 4     Problem listed updated   Provider Accepted, (number of) suggestions accepted: 4              [place] [address]  coding oppertunities             Chart reviewed, (number of) suggestions sent to provider: 4                 E11 22, N18 31 T2DM with diabetic stage 3a CKD ([place] [address] )    I27 20 Pulmonary Hypertension ([place] [address] )    I13 0 Hypertensive heart and CKD with heart failure  * code also stage of CKD and type of heart failure     If this is correct, please document and assess at your next visit 4/13/21

## 2021-04-08 NOTE — PROGRESS NOTES
Assessment/Plan:     Diagnoses and all orders for this visit:    Cirrhosis of liver with ascites, unspecified hepatic cirrhosis type [facility])  Patient has a history of cirrhosis  No signs of encephalopathy  No  Ascites at this time  Diuretics are being managed by Cardiology given fluid overload and CHF  Last endoscopy did not show any esophageal varices  [facility] [address]  screening is up-to-date  His meld is 32 however this is largely driven by his elevated INR likely secondary to Coumadin use  He is to follow-up with his primary care doctor next week to have this read checked  He will be due for repeat blood work and imaging July and see thereafter for follow-up  -     Ambulatory referral to Gastroenterology  -     CBC and differential; Future  -     AFP tumor marker; Future  -     Comprehensive metabolic panel; Future  -     Protime-INR; Future  -     US right upper quadrant; Future          Subjective:      Patient ID: Lizette Boyd is a 57 y o  male  HPI       This is a follow-up for cirrhosis  Etiology is not exactly certain but possibly secondary to alcohol versus fatty liver disease   versus cardiac  He was recently in the hospital with acute CHF exacerbation and fluid overload  He admitted to not being compliant with his diuretics at home  Since then he has lost a significant amount of weight and is feeling much better  He denies any shortness of breath  He does have some lower extremity edema but states it has improved considerably  No significant ascites on imaging  Patient denies abdominal pain  No episodes of confusion  He is not on lactulose or rifaximin  He did have an endoscopy in October 2020 without varices  AFP was checked in January was within normal limits  CT scan was checked in February, no liver mass  LFTs are within normal limits with the exception of a mildly elevated alkaline phosphatase  INR is elevated but he is on Coumadin for AFib platelet count is within normal limits  He denies any alcohol use  His last colonoscopy was in October of 2020 was found to have AVMs, treated with APC  Hemoglobin is currently stable at 9 6      Patient Active Problem List   Diagnosis    Atrial fibrillation (HCC)    Chronic diastolic (congestive) heart failure (HCC)    Type 2 diabetes mellitus with diabetic chronic kidney disease (HCC)    Class 3 severe obesity due to excess calories with serious comorbidity and body mass index (BMI) of 45 0 to 49 9 in adult [facility])    Lymphedema    Long term (current) use of anticoagulants    Chronic venous insufficiency    Varicose veins of both lower extremities with inflammation    Hypertensive heart and chronic kidney disease with heart failure and stage 1 through stage 4 chronic kidney disease, or chronic kidney disease (HCC)    STEFF (obstructive sleep apnea)    Pleural effusion on right    SOB (shortness of breath)    Cirrhosis of liver with ascites (HCC)    CKD (chronic kidney disease) stage 3, GFR 30-59 ml/min    Hyperkalemia    Tobacco abuse    Chronic anemia    Thrombocytopenia (HCC)    Hypokalemia    Other cirrhosis of liver ([facility] [address] )    Encounter for screening for other viral diseases     Lactic acidosis    Permanent atrial fibrillation (HCC)    Diverticulosis of colon    Lesion of spleen    Type 2 diabetes mellitus with hyperglycemia, without long-term current use of insulin (HCC)    Acute on chronic diastolic CHF (congestive heart failure) (HCC)    Acute respiratory failure with hypoxia (HCC)    Tobacco use    Hyponatremia    Constipation    Pulmonary hypertension (HCC)    Vitamin D deficiency    Atelectasis    Anemia    Hypercalcemia    MARISABEL (acute kidney injury) ([facility] [address] )    Stage 3b chronic kidney disease    Hypertensive nephrosclerosis    Chronic kidney disease-mineral and bone disorder     No Known Allergies  Current Outpatient Medications on File Prior to Visit   Medication Sig    carvedilol (COREG) 6 25 mg tablet Take 1 tablet (6 25 mg total) by mouth 2 (two) times a day with meals    docusate sodium (COLACE) 100 mg capsule Take 1 capsule (100 mg total) by mouth 2 (two) times a day as needed for constipation    ergocalciferol (ERGOCALCIFEROL) 1 25 MG (41807 UT) capsule Take 1 capsule (50,000 Units total) by mouth once a week    furosemide (LASIX) 80 mg tablet Take 1 tablet (80 mg total) by mouth 2 (two) times a day    metolazone (ZAROXOLYN) 5 mg tablet Take one twice a week or daily prn 3 lb weight gain in 24 hours   omeprazole (PriLOSEC) 20 mg delayed release capsule Take 1 capsule (20 mg total) by mouth 2 (two) times a day    polyethylene glycol (MIRALAX) 17 g packet Take 17 g by mouth daily as needed (constipation)    potassium chloride (K-DUR,KLOR-CON) 20 mEq tablet Take 3 daily    warfarin (COUMADIN) 5 mg tablet Take 1/2 to 1 tablet by oral route daily   [DISCONTINUED] ferrous sulfate 324 (65 Fe) mg Take 1 tablet (324 mg total) by mouth 2 (two) times a day before meals    [DISCONTINUED] lisinopril (ZESTRIL) 5 mg tablet Take 1 tablet (5 mg total) by mouth daily (Patient not taking: Reported on 4/1/2021)     No current facility-administered medications on file prior to visit  History reviewed  No pertinent family history  Past Medical History:   Diagnosis Date    A-fib [facility])     Cardiac disease     Chronic combined systolic (congestive) and diastolic (congestive) heart failure (HCC)     Diabetes mellitus ([facility] [address] )     Dilated cardiomyopathy ([facility] [address] )     History of echocardiogram 11/24/2014    EF 0 30 (30%), Likely mod LV systolic dysfunction  Likely RV dysfunction as well   History of Lexiscan MPI 02/19/2016    EF 0 43 (43%), no prior MI or ischemia   Hx of echocardiogram 04/28/2017    Normal EF, Normal LV systolic function  Mild concentric LV hypertrophy  Mild mitral and tricuspid regurg      Hx: recurrent pneumonia     Hypertension     Long term (current) use of anticoagulants     Morbid (severe) obesity due to excess calories (HCC)     Neuropathy      Social History     Socioeconomic History    Marital status: /Civil Union     Spouse name: None    Number of children: None    Years of education: None    Highest education level: None   Occupational History    None   Social Needs    Financial resource strain: None    Food insecurity     Worry: None     Inability: None    Transportation needs     Medical: None     Non-medical: None   Tobacco Use    Smoking status: Current Some Day Smoker     Packs/day: 0 25     Types: Cigarettes    Smokeless tobacco: Never Used    Tobacco comment: 2-3 CIGARETTES DAILY   Substance and Sexual Activity    Alcohol use: Yes     Alcohol/week: 14 0 standard drinks     Types: 5 Glasses of wine, 5 Cans of beer, 4 Shots of liquor per week     Frequency: 2-3 times a week     Drinks per session: 5 or 6     Binge frequency: Never     Comment: WEEKLY    Drug use: Never    Sexual activity: Yes   Lifestyle    Physical activity     Days per week: None     Minutes per session: None    Stress: None   Relationships    Social connections     Talks on phone: None     Gets together: None     Attends [employer] service: None     Active member of club or organization: None     Attends meetings of clubs or organizations: None     Relationship status: None    Intimate partner violence     Fear of current or ex partner: None     Emotionally abused: None     Physically abused: None     Forced sexual activity: None   Other Topics Concern    None   Social History Narrative    None     Past Surgical History:   Procedure Laterality Date    HERNIA REPAIR      IR THORACENTESIS  8/25/2020    IR THORACENTESIS  2/23/2021    SPLENECTOMY, TOTAL      VASCULAR SURGERY Right     leg         Review of Systems   All other systems reviewed and are negative          Objective:      /63 (BP Location: Right arm, Patient Position: Sitting, Cuff Size: Extra-Large)   Pulse 101   Temp 98 5 °F (36 9 °C) (Tympanic)   Ht 5' 11" (1 803 m)   Wt 127 kg (279 lb 9 6 oz)   BMI 39 00 kg/m²          Physical Exam  Constitutional:       Appearance: He is well-developed  He is obese  HENT:      Head: Normocephalic and atraumatic  Eyes:      Conjunctiva/sclera: Conjunctivae normal    Neck:      Musculoskeletal: Normal range of motion  Cardiovascular:      Rate and Rhythm: Normal rate and regular rhythm  Pulmonary:      Effort: Pulmonary effort is normal       Breath sounds: Normal breath sounds  Abdominal:      General: Bowel sounds are normal  There is no distension  Palpations: Abdomen is soft  Tenderness: There is no abdominal tenderness  Musculoskeletal: Normal range of motion  Right lower leg: Edema present  Left lower leg: Edema present  Skin:     General: Skin is warm and dry  Neurological:      Mental Status: He is alert and oriented to person, place, and time     Psychiatric:         Mood and Affect: Mood normal          Behavior: Behavior normal

## 2021-04-08 NOTE — PATIENT INSTRUCTIONS
Scheduled patient for a U/S on 8/16/2021 @ 8:30 am and 4 month follow up appointment  Gave labs  Recall in for 9/2021 with Lucinda Miranda PA-C Patient expressed understanding

## 2021-04-08 NOTE — TELEPHONE ENCOUNTER
Patient states he never got his medication from [facility] mail order he [facility]

## 2021-04-13 NOTE — TELEPHONE ENCOUNTER
Jonathan Ramos called to let you know that Corrinne Ina has medicare and CHF is not a covered diagnosis and his EF needs to be 35 or less for cardiac rehab    She also wanted to let you know that the diagnosis of pulm htn is a covered dx for pulmonary rehab  Do you want to put in an order for that?

## 2021-04-15 NOTE — PATIENT INSTRUCTIONS
No changes in medication health or diet  No missed or extra doses  No s/s of bleeding TIA or CVA  No new ABX, NSAIDs OCT meds, or supplements  Dose as instructed and recheck in four weeks     Swetha Contreras PA-C

## 2021-04-17 NOTE — PROGRESS NOTES
[facility]  coding opportunities             Chart reviewed, (number of) suggestions sent to provider: 4     Problem listed updated   Provider Accepted, (number of) suggestions accepted: 4        Patients insurance company: [address]  (Medicare Advantage and Commercial)     Visit status: Patient ""no showed"" to the scheduled appointment

## 2021-04-22 NOTE — PROGRESS NOTES
Follow-Up Note - [facility]  57 y o  male  SNO:0[birth year]  ZUP:[id number]    CC: I saw this patient for follow-up in clinic today for Sleep disordered breathing, Coexisting Sleep and Medical Problems  A sleep study to titrate Positive airway pressure (PAP) therapy was undertaken  Patient is here to review results and to adjust therapy  He got a BiPAP machine in February of this year  The study demonstrated sleep disordered breathing was successfully remediated with PAP at 17/11 cm H2O  He had difficulty maintaining sleep  Sleep efficiency was reduced at 76 3%  A diagnostic study in February of 2020 demonstrated severe obstructive sleep apnea: AHI 44 2 /hour , higher during REM at 57 1 per hour  Minimum oxygen saturation 77 % and 65 minutes of total sleep time was spent with saturations less than 90%  Sleep efficiency was reduced at 49 9%  ECG demonstrated atrial fibrillation  PFSH, Problem List, Medications & Allergies were reviewed in EMR  Interval changes: none reported  He  has a past medical history of A-fib [facility]), Cardiac disease, Chronic combined systolic (congestive) and diastolic (congestive) heart failure ([facility] [address] ), Diabetes mellitus ([facility] [address] ), Dilated cardiomyopathy ([facility] [address] ), History of echocardiogram (11/24/2014), History of Lexiscan MPI (02/19/2016), echocardiogram (04/28/2017), recurrent pneumonia, Hypertension, Long term (current) use of anticoagulants, Morbid (severe) obesity due to excess calories ([facility] [address] ), and Neuropathy  He has a current medication list which includes the following prescription(s): carvedilol, docusate sodium, ergocalciferol, ferrous sulfate, furosemide, glucose blood, freestyle, lisinopril, metolazone, polyethylene glycol, potassium chloride, warfarin, and omeprazole  ROS: as attached  PHYSIOLOGICAL DATA REVIEW AND INTERPRETATION:   using PAP > 4 hours/night 30%  Estimated BRANDON 1 1/hour with pressure of 17/11cm H2O @90th percentile    He did not use the machine for 8 out of the past 30 days while he was hospitalized for CHF  On days used, he is averaging just over 3 hours per night  Sometimes affect get to put the mask back on after going to the bathroom " Compliance: fair; Sleep disordered breathing:stable & within target range    SUBJECTIVE: Regarding use of PAP, Angel Spicer reports:   · He is experiencing some adverse effects: dry mouth/throat  · He is benefiting from use: sleeping better and no longer snoring / having breathing difficulties   Sleep Routine: He reports getting >8 hrs sleep  ; he no difficulty initiating or maintaining sleep   He awakens with aid of an alarm and feels more refreshed since on Rx  Angel Spicer reports episodic  Excessive Daytime Sleepiness naps occasionally and rated himself at Total score: 7 /24 on the [place] Sleepiness Scale  Habits: reports that he has been smoking cigarettes  He has been smoking about 0 25 packs per day  He has never used smokeless tobacco ,  reports current alcohol use of about 14 0 standard drinks of alcohol per week  ,  reports no history of drug use , Caffeine use: limited , Exercise routine: none   EXAM: BP (!) 80/50 (BP Location: Left arm, Cuff Size: Large)   Pulse 104   Temp (!) 97 3 °F (36 3 °C) (Temporal)   Ht 5' 11" (1 803 m)   Wt (!) 137 kg (302 lb)   SpO2 100%   BMI 42 12 kg/m²     Patient is well groomed; well appearing  Skin/Extrem: col & hydration normal; no edema  Psych: cooperativeand in no distress  Mental state:appears normal   CNS: Alert, orientated, clear & coherent speech  H&N: EOMI; NC/AT:no facial pressure marks, no rashes  Physical findings otherwise essentially unchanged from previous  IMPRESSION: Problem List Items & Comorbidities Addressed this Visit    1  STEFF (obstructive sleep apnea)  PAP DME Resupply/Reorder   2  Other insomnia     3  Chronic diastolic congestive heart failure ([address] )     4  Permanent atrial fibrillation ([address] )     5  Essential hypertension     6  Pulmonary hypertension (HCC)     7  Lymphedema     8  Morbid obesity with BMI of 40 0-44 9, adult ([facility] [address] )         PLAN:  1  I reviewed results of prior studies and physiologic data with the patient  I discussed treatment options with risks and benefits  2  Treatment with  PAP is medically necessary and Alta Goldman is agreable to continue use  3  Care of equipment, methods to improve comfort using PAP and importance of compliance with therapy were discussed  4  Pressure setting: continue 17/11 cmH2O     5  Rx provided to replace supplies and Care coordinated with DME provider  6  Discussed strategies for weight reduction  7  Follow-up is advised in 1 year or sooner if needed to monitor progress, compliance and to adjust therapy  Thank you for allowing me to participate in the care of this patient      Sincerely,    Authenticated electronically by Jace Frank MD on 29/18/19   Board Certified Specialist

## 2021-04-22 NOTE — PATIENT INSTRUCTIONS

## 2021-04-22 NOTE — PROGRESS NOTES
Review of Systems      Genitourinary need to urinate more than twice a night   Cardiology ankle/leg swelling   Gastrointestinal none   Neurology none   Constitutional none   Integumentary rash or dry skin   Psychiatry none   Musculoskeletal joint pain   Pulmonary difficulty breathing when lying flat    ENT none   Endocrine excessive thirst   Hematological none

## 2021-05-22 PROBLEM — R79.1 SUPRATHERAPEUTIC INR: Status: ACTIVE | Noted: 2018-10-24

## 2021-05-22 PROBLEM — N17.9 ACUTE RENAL FAILURE SUPERIMPOSED ON STAGE 3B CHRONIC KIDNEY DISEASE (HCC): Status: ACTIVE | Noted: 2021-01-01

## 2021-05-22 NOTE — ASSESSMENT & PLAN NOTE
· Permanent atrial fibrillation   · Managed on Coreg 6 25 milligrams b i d     · Chronic anticoagulation with Coumadin 5 milligrams Sunday, Tuesday, Wednesday, Friday, Saturday and 2 5 milligrams Monday and Thursday  · Hold Coumadin given IR thoracentesis tomorrow  · Without RVR  · Managed by Dr Carol Dc

## 2021-05-22 NOTE — ASSESSMENT & PLAN NOTE
Lab Results   Component Value Date    EGFR 24 05/22/2021    EGFR 23 05/21/2021    EGFR 52 04/07/2021    CREATININE 2 72 (H) 05/22/2021    CREATININE 2 80 (H) 05/21/2021    CREATININE 1 43 (H) 04/07/2021   · last seen by outpatient nephrology on 3/15  · With baseline creat about 1 6  · Suspect secondary to renovascular congestion and increase in home lasix to 80mg bid in March  · Avoid hypotension  · Nephrology consultation, appreciate recommendations  · Bmp in am

## 2021-05-22 NOTE — CONSULTS
[address] [facility] 57 y o  male MRN: [id number]  Unit/Bed#: E2 -01 Encounter: [id number]    ASSESSMENT and PLAN:    57 y o  male with a past medical history of CKD, hypertension, diastolic heart failure, AFib on Coumadin, cirrhosis, recurrent pleural effusion, diabetes, who was admitted to [facility] after being transferred from [facility] on 05/22 after presenting with shortness of breath  A renal consultation is requested today for assistance in the management of acute on chronic kidney injury  1) acute on chronic kidney injury stage III    -outpatient nephrologist is Dr Angie Brice  - prior baseline creatinine was felt to be around 1 6 mg/dL in euvolemic state  -prior CT scan in February-with subcentimeter hypo enhancing renal lesions Bosniak 2  Otherwise no suspicious parenchymal masses or collections or hydronephrosis  -urinalysis in March 2021 was bland  -admission creatinine on 05/21 was 2 8 mg/dL  -creatinine is stable on 05/22  Etiology of acute kidney injury is unclear  May be in the setting of cardiorenal versus less likely obstructive versus auto regulatory failure in the setting of ACE-inhibitor  Blood pressures are marginal   Leading to ATN potentially  Also contributing is possibly anemia  Plan  -check UA  -check urine lytes  -check PVR  -hold ACE-inhibitor  -fluid restriction and 2 g salt restricted diet  -give Lasix 40 mg IV 1 time with albumin tonight and reassess in the morning for diuretic need  And monitor for hypotension  Hold parameters on Lasix placed   -monitor strict urine output  -monitor for bleeding issues - was noted to have bloody thoracentesis  -trending hemoglobin per primary team     If the patient is actively bleeding, will need to be very cautious with diuresis and patient may not tolerate  Patient is currently on room air  Is 10 kg above his dry weight  Dry weight is approximately 283 lb    When taking into account the 2 L removed to the thoracentesis, is a total of about 10 kilos above his dry weight  2) volume overload    -home regimen for volume management was furosemide 80 mg twice a day with metolazone twice a week 5 mg  -April 1st weight at the cardiology office was 130 kg  -weight in the GI office on April 8th was 127 kg   -x-ray films reviewed-with large effusion on the right lung, cardiomegaly, some vascular congestion  Final read pending  -echocardiogram in February 2021 with EF 60%, wall thickness mildly increased, PA pressure is 52, IVC was not well visualized    3) recurrent pleural effusion-status post thoracentesis today    4) electrolytes    Hyponatremia--monitor with diuresis  Patient is volume overloaded  5) acid/base-stable with the exception of slightly higher phosphorus  6)Cirrhosis-etiology unclear possibly secondary to alcohol versus fatty liver disease versus cardiac     -recently saw the GI team on April 8th  7) recurrent right pleural effusion    -status post IR intervention on 05/22  -prior was transudative  -2 L bloody effusion drained today by IR team on 05/22    8) supratherapeutic INR per primary team-    9) hypertension-patient has chronic hypotension looking at the prior blood pressures at recent office visits  Therefore okay with low-dose diuretic intravenous tonight with albumin  HISTORY OF PRESENT ILLNESS:  Requesting Physician: Soy Santos MD  Reason for Consult:  Acute on chronic kidney injury    Sami Lan is a 57 y o  male with a past medical history of CKD, hypertension, diastolic heart failure, AFib on Coumadin, cirrhosis, recurrent pleural effusion, diabetes, who was admitted to [facility] after being transferred from [facility] on 05/22 after presenting with shortness of breath  A renal consultation is requested today for assistance in the management of acute on chronic kidney injury    Patient was initially sent to the hospital due to worsening renal function and decreased hemoglobin and shortness of breath  Patient had admission in February for acute on chronic diastolic heart failure and received diuresis of approximately 70 lb during that admission  Patient states that was having worsening edema and weight gain over the last 2-3 days  Was also having worsening shortness of breath  Denies fevers, chills, nausea, vomiting, diarrhea  States that was taking diuretics as instructed  PAST MEDICAL HISTORY:  Past Medical History:   Diagnosis Date    A-fib [facility])     Cardiac disease     Chronic combined systolic (congestive) and diastolic (congestive) heart failure (HCC)     Diabetes mellitus ([facility] )     Dilated cardiomyopathy ([facility] )     History of echocardiogram 11/24/2014    EF 0 30 (30%), Likely mod LV systolic dysfunction  Likely RV dysfunction as well   History of Lexiscan MPI 02/19/2016    EF 0 43 (43%), no prior MI or ischemia   Hx of echocardiogram 04/28/2017    Normal EF, Normal LV systolic function  Mild concentric LV hypertrophy  Mild mitral and tricuspid regurg      Hx: recurrent pneumonia     Hypertension     Long term (current) use of anticoagulants     Morbid (severe) obesity due to excess calories (HCC)     Neuropathy        PAST SURGICAL HISTORY:  Past Surgical History:   Procedure Laterality Date    HERNIA REPAIR      IR THORACENTESIS  8/25/2020    IR THORACENTESIS  2/23/2021    SPLENECTOMY, TOTAL      VASCULAR SURGERY Right     leg       ALLERGIES:  No Known Allergies    SOCIAL HISTORY:  Social History     Substance and Sexual Activity   Alcohol Use Yes    Alcohol/week: 14 0 standard drinks    Types: 5 Glasses of wine, 5 Cans of beer, 4 Shots of liquor per week    Frequency: 2-3 times a week    Drinks per session: 5 or 6    Binge frequency: Never    Comment: WEEKLY     Social History     Substance and Sexual Activity   Drug Use Never     Social History     Tobacco Use   Smoking Status Current Some Day Smoker    Packs/day: 0 25    Types: Cigarettes   Smokeless Tobacco Never Used   Tobacco Comment    2-3 CIGARETTES DAILY       FAMILY HISTORY:  No family history on file  MEDICATIONS:    Current Facility-Administered Medications:     acetaminophen (TYLENOL) tablet 650 mg, 650 mg, Oral, Q6H PRN, Zula Imus, PA-C    calcium carbonate (TUMS) chewable tablet 1,000 mg, 1,000 mg, Oral, Daily PRN, Zula Imus, PA-C    [START ON 5/23/2021] carvedilol (COREG) tablet 6 25 mg, 6 25 mg, Oral, BID With Meals, Ca Rg DO    ergocalciferol (VITAMIN D2) capsule 50,000 Units, 50,000 Units, Oral, Weekly, Zula Imus, PA-C    HYDROmorphone HCl (DILAUDID) injection 0 2 mg, 0 2 mg, Intravenous, Q6H PRN, Marino Ledesma MD  [facility] 5/23/2021] nicotine (NICODERM CQ) 14 mg/24hr TD 24 hr patch 1 patch, 1 patch, Transdermal, Daily, Zula Imus, PA-C    ondansetron [facility]) injection 4 mg, 4 mg, Intravenous, Q6H PRN, Zula Imus, PA-C    oxyCODONE-acetaminophen (PERCOCET) 5-325 mg per tablet 1 tablet, 1 tablet, Oral, Q6H PRN, Marino Ledesma MD    polyethylene glycol (MIRALAX) packet 17 g, 17 g, Oral, Daily PRN, Zula Imus, PA-C    REVIEW OF SYSTEMS:    All the systems were reviewed and were negative except as documented on the HPI      PHYSICAL EXAM:  Current Weight:    First Weight:    Vitals:    05/22/21 1523 05/22/21 1712   BP: 123/76 93/58   BP Location: Right arm    Pulse: 96 92   Resp: 18 16   Temp: (!) 96 6 °F (35 9 °C)    TempSrc: Temporal    SpO2: 100% 100%       Intake/Output Summary (Last 24 hours) at 5/22/2021 1817  Last data filed at 5/22/2021 1709  Gross per 24 hour   Intake --   Output 2000 ml   Net -2000 ml     Physical Exam  General: NAD  Skin: no rash  Eyes: anicteric sclera  ENT: moist mucous membrane  Neck: supple  Chest: CTA b/l, no ronchii, no wheeze, no rubs, with the exception of rales at the bases  CVS: s1s2, no murmur, no gallop, no rub  Abdomen: soft, nontender, nl sounds  Extremities:  2 to 3+  : no leahy  Neuro: AAOX3  Psych: normal affect      Invasive Devices:      Lab Results:   Results from last 7 days   Lab Units 05/22/21  0850 05/21/21  0931   WBC Thousand/uL 3 74* 4 18*   HEMOGLOBIN g/dL 7 6* 7 4*   HEMATOCRIT % 23 4* 23 9*   PLATELETS Thousands/uL 162 170   POTASSIUM mmol/L 4 0 4 2   CHLORIDE mmol/L 94* 96*   CO2 mmol/L 30 30   BUN mg/dL 88* 93*   CREATININE mg/dL 2 72* 2 80*   CALCIUM mg/dL 8 3 8 6   MAGNESIUM mg/dL  --  2 3   PHOSPHORUS mg/dL  --  5 2*   ALK PHOS U/L 133* 130*   ALT U/L 17 16   AST U/L 20 20

## 2021-05-22 NOTE — BRIEF OP NOTE (RAD/CATH)
INTERVENTIONAL RADIOLOGY PROCEDURE NOTE    Date: 5/22/2021    Procedure: IR CHEST TUBE PLACEMENT     Preoperative diagnosis:   1  Chronic diastolic (congestive) heart failure ([facility] [address] [address] )    2  Acute renal failure superimposed on stage 3b chronic kidney disease, unspecified acute renal failure type ([facility] [address] )         Postoperative diagnosis: Same  Surgeon: Mike Hinkle MD     Assistant: None  No qualified resident was available  Blood loss: minimal    Specimens: 2 liters of bloody effusion    Findings: Successful placement of a right chest tube for loculated effusion with 2 liters of bloody fluid withdrawn  Plan:  Please correct the elevated INR  Waterseal tonight and then back to pleurovac suction tomorrow to complete the drainage  CXR now and in the AM     Complications: None immediate      Anesthesia: local

## 2021-05-22 NOTE — ED PROVIDER NOTES
History  Chief Complaint   Patient presents with    Shortness of Breath     patient reports two days of sob  he had blood work done yesterday that showed his hemoglobin was low  56-year-old male with past medical history of combined systolic and diastolic CHF, atrial fibrillation anticoagulated with Coumadin, cirrhosis, CKD stage 3, tobacco abuse, lymphedema, and obesity who is presenting for evaluation of shortness of breath and abnormal labs  Patient had outpatient labs performed yesterday  He received a call yesterday evening noting that his hemoglobin had decreased and his renal function worsened  The patient decided not to come in until this morning  He also reports that he has had worsening shortness of breath since yesterday  He is short of breath at rest but is worse with minimal exertion  He also has some orthopnea  Patient denies any paroxysmal nocturnal dyspnea although he does wear a CPAP due to his history of STEFF  The patient reports a cough that is productive of sputum  He denies any hemoptysis  No fever, chills, URI symptoms, chest pain, nausea, vomiting, abdominal pain, or urinary symptoms  Leg swelling is at baseline  The patient states that he has been weighing himself and that his weight has remained relatively stable  Finally, the patient is immunized against COVID-19  He received his 2nd immunization on April 16th, so he is fully vaccinated  The patient was most recently admitted to this facility in February 2021  He presented with shortness of breath and hypoxia  Patient was admitted for a total of 17 days  He required thoracentesis by IR   1600 mL of pleural fluid was obtained  Fluid analysis consistent with transudate  The patient received extensive diuresis due to volume overload  Patient was seen in consultation by GI, Nephrology, and Cardiology during his admission  Of note, the patient also underwent thoracentesis in August 2020         SUMMARY     LEFT VENTRICLE:  Systolic function was normal  Ejection fraction was estimated to be 60 %  There were no regional wall motion abnormalities  Wall thickness was mildly increased      RIGHT VENTRICLE:  The ventricle was moderately dilated  Systolic function was mildly reduced      LEFT ATRIUM:  The atrium was mildly dilated      RIGHT ATRIUM:  The atrium was moderately dilated      MITRAL VALVE:  There was mild regurgitation      TRICUSPID VALVE:  There was moderate regurgitation  Estimated peak PA pressure was 52 mmHg  Wt Readings from Last 3 Encounters:   05/22/21 (!) 136 kg (300 lb 0 7 oz)   04/22/21 (!) 137 kg (302 lb)   04/08/21 127 kg (279 lb 9 6 oz)       Prior to Admission Medications   Prescriptions Last Dose Informant Patient Reported? Taking? Lancets (freestyle) lancets  Self No No   Sig: Test glucose once a day  carvedilol (COREG) 6 25 mg tablet Not Taking at Unknown time Self No No   Sig: Take 1 tablet (6 25 mg total) by mouth 2 (two) times a day with meals   Patient not taking: Reported on 5/22/2021   docusate sodium (COLACE) 100 mg capsule Not Taking at Unknown time Self No No   Sig: Take 1 capsule (100 mg total) by mouth 2 (two) times a day as needed for constipation   Patient not taking: Reported on 5/22/2021   ergocalciferol (ERGOCALCIFEROL) 1 25 MG (79117 UT) capsule  Self No Yes   Sig: Take 1 capsule (50,000 Units total) by mouth once a week   ferrous sulfate 324 (65 Fe) mg Not Taking at Unknown time Self No No   Sig: Take 1 tablet (324 mg total) by mouth 2 (two) times a day before meals   Patient not taking: Reported on 5/22/2021   furosemide (LASIX) 80 mg tablet 5/21/2021 at Unknown time Self No Yes   Sig: Take 1 tablet (80 mg total) by mouth 2 (two) times a day   glucose blood test strip  Self No No   Sig: Test glucose once a day     lisinopril (ZESTRIL) 5 mg tablet 5/21/2021 at Unknown time  No Yes   Sig: Take 1 tablet (5 mg total) by mouth daily   metolazone (ZAROXOLYN) 5 mg tablet  at prn  No No   Sig: Take one twice a week or daily prn 3 lb weight gain in 24 hours  omeprazole (PriLOSEC) 20 mg delayed release capsule Not Taking at Unknown time Self No No   Sig: Take 1 capsule (20 mg total) by mouth 2 (two) times a day   Patient not taking: Reported on 4/22/2021   polyethylene glycol (MIRALAX) 17 g packet Not Taking at Unknown time Self No No   Sig: Take 17 g by mouth daily as needed (constipation)   Patient not taking: Reported on 5/22/2021   potassium chloride (K-DUR,KLOR-CON) 20 mEq tablet 5/21/2021 at Unknown time Self No Yes   Sig: Take 3 daily   warfarin (COUMADIN) 5 mg tablet 5/21/2021 at Unknown time Self No Yes   Sig: Take 1/2 to 1 tablet by oral route daily  Facility-Administered Medications: None       Past Medical History:   Diagnosis Date    A-fib [facility])     Cardiac disease     Chronic combined systolic (congestive) and diastolic (congestive) heart failure (HCC)     Diabetes mellitus ([facility] )     Dilated cardiomyopathy ([facility] )     History of echocardiogram 11/24/2014    EF 0 30 (30%), Likely mod LV systolic dysfunction  Likely RV dysfunction as well   History of Lexiscan MPI 02/19/2016    EF 0 43 (43%), no prior MI or ischemia   Hx of echocardiogram 04/28/2017    Normal EF, Normal LV systolic function  Mild concentric LV hypertrophy  Mild mitral and tricuspid regurg   Hx: recurrent pneumonia     Hypertension     Long term (current) use of anticoagulants     Morbid (severe) obesity due to excess calories (HCC)     Neuropathy        Past Surgical History:   Procedure Laterality Date    HERNIA REPAIR      IR THORACENTESIS  8/25/2020    IR THORACENTESIS  2/23/2021    SPLENECTOMY, TOTAL      VASCULAR SURGERY Right     leg       History reviewed  No pertinent family history  I have reviewed and agree with the history as documented      E-Cigarette/Vaping    E-Cigarette Use Never User      E-Cigarette/Vaping Substances    Nicotine No     THC No     CBD No     Flavoring No     Other No     Unknown No      Social History     Tobacco Use    Smoking status: Current Some Day Smoker     Packs/day: 0 25     Types: Cigarettes    Smokeless tobacco: Never Used    Tobacco comment: 2-3 CIGARETTES DAILY   Substance Use Topics    Alcohol use: Yes     Alcohol/week: 14 0 standard drinks     Types: 5 Glasses of wine, 5 Cans of beer, 4 Shots of liquor per week     Frequency: 2-3 times a week     Drinks per session: 5 or 6     Binge frequency: Never     Comment: WEEKLY    Drug use: Never       Review of Systems   Constitutional: Negative for diaphoresis, fever and unexpected weight change  HENT: Negative for congestion, rhinorrhea and sore throat  Eyes: Negative for pain, discharge and visual disturbance  Respiratory: Positive for shortness of breath  Negative for cough and wheezing  Cardiovascular: Positive for leg swelling (chronic, baseline)  Negative for chest pain and palpitations  Gastrointestinal: Negative for abdominal pain, blood in stool, constipation, diarrhea, nausea and vomiting  Genitourinary: Negative for dysuria, flank pain and hematuria  Musculoskeletal: Negative for arthralgias and myalgias  Skin: Negative for rash and wound  Allergic/Immunologic: Negative for environmental allergies and food allergies  Neurological: Negative for dizziness, seizures, weakness and numbness  Hematological: Negative for adenopathy  Psychiatric/Behavioral: Negative for confusion and hallucinations  Physical Exam  Physical Exam  Vitals signs and nursing note reviewed  Constitutional:       Appearance: He is well-developed  He is obese  He is not diaphoretic  Comments: Chronically ill-appearing older male, no acute distress  HENT:      Head: Normocephalic and atraumatic  Right Ear: External ear normal       Left Ear: External ear normal       Nose: Nose normal    Eyes:      Pupils: Pupils are equal, round, and reactive to light  Cardiovascular:      Rate and Rhythm: Normal rate and regular rhythm  Heart sounds: Normal heart sounds  Comments: Hands and feet are warm and well perfused  Capillary refill under 2 seconds in all extremities  Pulmonary:      Effort: Pulmonary effort is normal  No respiratory distress  Breath sounds: No wheezing or rales  Comments: Diminished breath sounds on the right  No rales  Oxygen saturation 89% on room air, improved to the high 90s on 3 L by nasal cannula  Mild conversational dyspnea noted  Mild tachypnea but no increased work of breathing  Abdominal:      General: Bowel sounds are normal  There is no distension  Palpations: Abdomen is soft  Tenderness: There is no abdominal tenderness  There is no guarding  Comments: No tenderness, limited due to body habitus  Musculoskeletal: Normal range of motion  General: No deformity  Comments: Significant edema of the bilateral lower extremities, gray discoloration noted to bilateral lower extremities  Skin:     General: Skin is warm and dry  Capillary Refill: Capillary refill takes less than 2 seconds  Neurological:      Mental Status: He is alert and oriented to person, place, and time  Comments: No gross motor deficits noted  Cranial nerves II-XII are intact  Speech is normal, without dysarthria or aphasia     Psychiatric:         Mood and Affect: Mood normal          Behavior: Behavior normal          Vital Signs  ED Triage Vitals [05/22/21 0832]   Temperature Pulse Respirations Blood Pressure SpO2   (!) 97 1 °F (36 2 °C) (!) 117 (!) 24 109/59 (!) 89 %      Temp Source Heart Rate Source Patient Position - Orthostatic VS BP Location FiO2 (%)   Temporal Monitor Sitting Left arm --      Pain Score       --           Vitals:    05/22/21 1145 05/22/21 1200 05/22/21 1215 05/22/21 1245   BP: 109/55 124/79 111/72 120/95   Pulse: 96 103 (!) 106 (!) 113   Patient Position - Orthostatic VS: Lying Lying Lying Lying         Visual Acuity      ED Medications  Medications   phytonadione (MEPHYTON) tablet 5 mg (5 mg Oral Given 5/22/21 1141)       Diagnostic Studies  Results Reviewed     Procedure Component Value Units Date/Time    Blood gas, venous [[id number]]  (Abnormal) Collected: 05/22/21 0919    Lab Status: Final result Specimen: Blood from Arm, Left Updated: 05/22/21 0925     pH, Karthik 7 412     pCO2, Karthik 41 9 mm Hg      pO2, Karthik 64 4 mm Hg      HCO3, Karthik 26 1 mmol/L      Base Excess, Karthik 1 3 mmol/L      O2 Content, Karthik 10 2 ml/dL      O2 HGB, VENOUS 86 6 %     NT-BNP PRO [[id number]]  (Abnormal) Collected: 05/22/21 0850    Lab Status: Final result Specimen: Blood from Arm, Right Updated: 05/22/21 0918     NT-proBNP 981 0 pg/mL     Lactic acid, plasma [[id number]]  (Normal) Collected: 05/22/21 0850    Lab Status: Final result Specimen: Blood from Arm, Right Updated: 05/22/21 0916     LACTIC ACID 1 2 mmol/L     Narrative:      Result may be elevated if tourniquet was used during collection      Troponin I [[id number]]  (Normal) Collected: 05/22/21 0850    Lab Status: Final result Specimen: Blood from Arm, Right Updated: 05/22/21 0915     Troponin I <0 02 ng/mL     Comprehensive metabolic panel [[id number]]  (Abnormal) Collected: 05/22/21 0850    Lab Status: Final result Specimen: Blood from Arm, Right Updated: 05/22/21 0913     Sodium 129 mmol/L      Potassium 4 0 mmol/L      Chloride 94 mmol/L      CO2 30 mmol/L      ANION GAP 5 mmol/L      BUN 88 mg/dL      Creatinine 2 72 mg/dL      Glucose 116 mg/dL      Calcium 8 3 mg/dL      Corrected Calcium 9 1 mg/dL      AST 20 U/L      ALT 17 U/L      Alkaline Phosphatase 133 U/L      Total Protein 7 8 g/dL      Albumin 3 0 g/dL      Total Bilirubin 0 46 mg/dL      eGFR 24 ml/min/1 73sq m     Narrative:      [place] guidelines for Chronic Kidney Disease (CKD):     Stage 1 with normal or high GFR (GFR > 90 mL/min/1 73 square meters)    Stage 2 Mild CKD (GFR = 60-89 mL/min/1 73 square meters)    Stage 3A Moderate CKD (GFR = 45-59 mL/min/1 73 square meters)    Stage 3B Moderate CKD (GFR = 30-44 mL/min/1 73 square meters)    Stage 4 Severe CKD (GFR = 15-29 mL/min/1 73 square meters)    Stage 5 End Stage CKD (GFR <15 mL/min/1 73 square meters)  Note: GFR calculation is accurate only with a steady state creatinine    Protime-INR [[id number]]  (Abnormal) Collected: 05/22/21 0850    Lab Status: Final result Specimen: Blood from Arm, Right Updated: 05/22/21 0910     Protime 38 6 seconds      INR 4 07    CBC and differential [[id number]]  (Abnormal) Collected: 05/22/21 0850    Lab Status: Final result Specimen: Blood from Arm, Right Updated: 05/22/21 0857     WBC 3 74 Thousand/uL      RBC 2 51 Million/uL      Hemoglobin 7 6 g/dL      Hematocrit 23 4 %      MCV 93 fL      MCH 30 3 pg      MCHC 32 5 g/dL      RDW 17 8 %      MPV 10 4 fL      Platelets 307 Thousands/uL      nRBC 0 /100 WBCs      Neutrophils Relative 66 %      Immat GRANS % 0 %      Lymphocytes Relative 16 %      Monocytes Relative 12 %      Eosinophils Relative 5 %      Basophils Relative 1 %      Neutrophils Absolute 2 46 Thousands/µL      Immature Grans Absolute 0 01 Thousand/uL      Lymphocytes Absolute 0 60 Thousands/µL      Monocytes Absolute 0 45 Thousand/µL      Eosinophils Absolute 0 18 Thousand/µL      Basophils Absolute 0 04 Thousands/µL                  XR chest 1 view portable   ED Interpretation by Brittani Santos MD (05/22 0915)   X-ray interpreted by me  Formal Radiology interpretation to follow  Large right pleural effusion  Cardiomegaly  No other acute abnormalities  Procedures  Procedures         ED Course  ED Course as of May 22 1303   Sat May 22, 2021   0835 Pulse(!): 117   0835 Respirations(!): 24   0835 Improved to 99% on 3 L by nasal cannula  SpO2(!): 89 %   0859 EKG interpreted by me  Atrial fibrillation at 180 beats per minute  Normal axis  Incomplete right bundle-branch block  No T-wave inversions  No ST elevation or ST depression  7005 Reviewed labs that were performed yesterday as an outpatient  CBC demonstrated hemoglobin of 7 4 and a WBC count of 4 18  Hemoglobin A1c was normal at 5 5%  CMP demonstrated hyponatremia with a sodium of 131  BUN was 93 and creatinine was 2 8, significantly increased from prior baseline  INR was 4 2  Recent baseline hemoglobin appears to be in the mid 8s to mid 9s  Baseline creatinine appears to be approximately 1 6  Baseline BUN is in the high 20s to low 30s       0907 Hemoglobin(!): 7 6   0908 Patient is asymptomatic  No lightheadedness, dizziness, or presyncope  His mentation is normal   Extremities are warm and well perfused  Will closely monitor  Blood Pressure(!): 84/46   0913 INR(!): 4 07   0913 Sodium(!): 129   0913 BUN(!): 88   0913 Creatinine(!): 2 72   0915 Troponin I: <0 02   0917 LACTIC ACID: 1 2   0920 NT-proBNP(!): 981 0   1018 Patient was updated earlier regarding his lab results  I also updated him regarding his chest x-ray result  Explained that he had a large right-sided pleural effusion, likely causing his shortness of breath and new oxygen requirement  Patient also has evidence of decompensation of his liver disease  Discussed the case with Dr Gavin Adorno  Due to the patient's elevated INR, multiple comorbidities, tenuous respiratory status, and body habitus, I would not be comfortable performing a bedside thoracentesis at this time  IR is not available here for a few days  Therefore, the patient will need transfer  1034 Updated patient on the need for transfer  Patient agreeable  18 Spoke with Dr Tejinder Ram and PACS regarding transfer  PACS will check with IR and reach out to me  [id number] Spoke with Dr Jakob Whittaker from IR  We agreed that the procedure could be deferred until tomorrow unless the patient were to decompensate further    Will give oral vitamin K to lower INR in preparation for the thoracentesis tomorrow  SBIRT 20yo+      Most Recent Value   SBIRT (22 yo +)   In order to provide better care to our patients, we are screening all of our patients for alcohol and drug use  Would it be okay to ask you these screening questions? Yes Filed at: 05/22/2021 6223   Initial Alcohol Screen: US AUDIT-C    1  How often do you have a drink containing alcohol? 3 Filed at: 05/22/2021 0834   2  How many drinks containing alcohol do you have on a typical day you are drinking? 2 Filed at: 05/22/2021 0834   3a  Male UNDER 65: How often do you have five or more drinks on one occasion? 0 Filed at: 05/22/2021 0834   3b  FEMALE Any Age, or MALE 65+: How often do you have 4 or more drinks on one occassion? 0 Filed at: 05/22/2021 0834   Audit-C Score  5 Filed at: 05/22/2021 1172   ELIDA: How many times in the past year have you    Used an illegal drug or used a prescription medication for non-medical reasons? Never Filed at: 05/22/2021 6179                    MDM  Number of Diagnoses or Management Options  Acute kidney injury superimposed on chronic kidney disease [facility]): new and requires workup  Acute respiratory failure with hypoxia [facility]): new and requires workup  Anemia: new and requires workup  Anticoagulated on Coumadin: established and worsening  Diastolic CHF ([address] ): established and worsening  Pleural effusion on right: established and worsening  Shortness of breath: new and requires workup  Supratherapeutic INR: new and does not require workup  Tachypnea: new and requires workup  Diagnosis management comments:     Differential diagnosis included ACS, CHF exacerbation, kidney failure, symptomatic anemia, pneumonia, pneumothorax, and pleural effusion  The patient was placed on supplemental oxygen with improvement in his hypoxia  He had persistent tachypnea and mild conversational dyspnea  EKG did not show any ischemic changes    Troponin was within normal limits  CBC confirmed worsening of the patient's baseline anemia  CMP demonstrated significant acute kidney injury with hyponatremia, likely from volume overload  BNP was improved from prior, suggesting that this was not a CHF exacerbation  Chest x-ray demonstrated a large right-sided pleural effusion, the likely etiology for the patient's shortness of breath  Given the large volume of the pleural effusion, the patient's medical complexity, and his supratherapeutic INR, I decided that it would not be advisable to attempt bedside thoracentesis  IR was not available over the weekend at this hospital, so the patient was transferred to [address] [address] for IR evaluation and likely thoracentesis  The patient was agreeable to the plan         Amount and/or Complexity of Data Reviewed  Clinical lab tests: ordered and reviewed  Tests in the radiology section of CPT®: ordered and reviewed  Decide to obtain previous medical records or to obtain history from someone other than the patient: yes  Obtain history from someone other than the patient: yes  Review and summarize past medical records: yes  Discuss the patient with other providers: yes  Independent visualization of images, tracings, or specimens: yes    Risk of Complications, Morbidity, and/or Mortality  Presenting problems: high  Diagnostic procedures: minimal  Management options: minimal    Patient Progress  Patient progress: improved      Disposition  Final diagnoses:   Acute respiratory failure with hypoxia (HCC)   Tachypnea   Shortness of breath   Pleural effusion on right   Diastolic CHF ([address] )   Acute kidney injury superimposed on chronic kidney disease ([address] )   Anemia   Anticoagulated on Coumadin   Supratherapeutic INR     Time reflects when diagnosis was documented in both MDM as applicable and the Disposition within this note     Time User Action Codes Description Comment    5/22/2021 10:36 AM Mery Meyers Add [J96 01] Acute respiratory failure with hypoxia ([facility] )     5/22/2021 10:36 AM Harvey Jester Add [R06 82] Tachypnea     5/22/2021 10:36 AM Harvey Jester Add [R06 02] Shortness of breath     5/22/2021 10:37 AM Harvey Jester Add [J90] Pleural effusion on right     5/22/2021 10:37 AM Harvey Jester Add [X73 85] Diastolic CHF ([facility] )     5/73/4476 10:37 AM Harvey Jester Add [N17 9,  N18 9] Acute kidney injury superimposed on chronic kidney disease ([facility] )     5/22/2021 10:37 AM Harvey Jester Add [D64 9] Anemia     5/22/2021 10:37 AM Harvey Jester Add [Z79 01] Anticoagulated on Coumadin     5/22/2021 10:37 AM Harvey Jester Add [R79 1] Supratherapeutic INR       ED Disposition     ED Disposition Condition Date/Time Comment    Transfer to Another Facility-In Network  [facility] May 22, 2021 10:36 AM Baltazar Flores should be transferred out to [facility]          MD Documentation      Most Recent Value   Patient Condition  The patient has been stabilized such that within reasonable medical probability, no material deterioration of the patient condition or the condition of the unborn child(kasandra) is likely to result from the transfer   Reason for Transfer  Level of Care needed not available at this facility, No bed available at level of patient's needs   Benefits of Transfer  Specialized equipment and/or services available at the receiving facility (Include comment)________________________ [IR]   Risks of Transfer  Potential for delay in receiving treatment, Potential deterioration of medical condition, Increased discomfort during transfer   Accepting Physician  Katerine Villegas MD, Dr   Provider Certification  General risk, such as traffic hazards, adverse weather conditions, rough terrain or turbulence, possible failure of equipment (including vehicle or aircraft), or consequences of actions of persons outside the control of the transport personnel, Unanticipated needs of medical equipment and personnel during transport, Risk of worsening condition, The possibility of a transport vehicle being unavailable      RN Documentation      Most [address] Name, [address], [place], [place]      Follow-up Information    None         Patient's Medications   Discharge Prescriptions    No medications on file     No discharge procedures on file      PDMP Review       Value Time User    PDMP Reviewed  Yes 11/5/2020  5:40 PM Vickey Vieyra MD          ED Provider  Electronically Signed by           Leila Rosales MD  05/22/21 3282

## 2021-05-22 NOTE — ASSESSMENT & PLAN NOTE
· Last seen by outpatient GI on 04/08   · [facility]  screening is up-to-date   · Last EGD did not show any esophageal varices   · Likely contributing to his supratherapeutic INR and anemia

## 2021-05-22 NOTE — ASSESSMENT & PLAN NOTE
· POA, in the setting of long term anticoagulation For paroxysmal AFib   · Patient on Coumadin with INR goal 2-3   · Patient with supratherapeutic INR of 4 point 0 7   · Received oral vitamin K at the [facility] emergency department   · Patient to undergo IR thoracentesis tomorrow if INR is less than 3  · INR in a m

## 2021-05-22 NOTE — ASSESSMENT & PLAN NOTE
Wt Readings from Last 3 Encounters:   05/22/21 (!) 136 kg (300 lb 0 7 oz)   04/22/21 (!) 137 kg (302 lb)   04/08/21 127 kg (279 lb 9 6 oz)     · Patient euvolemic on exam  · Followed by Dr Didi Chowdhury  · Was admitted in early March for acute on chronic diastolic CHF, diuresed approximately 60 pounds during that admission  · Lasix 80 milligrams b i d    · Metolazone 5 milligrams twice a week  · Daily weights   · Monitor I's and O's

## 2021-05-22 NOTE — ASSESSMENT & PLAN NOTE
· Currently requiring 2 liters nasal cannula oxygen   · Denies use of oxygen at home  · Titrate to maintain oxygen saturations greater than 88 percent   · Likely secondary to right-sided pleural effusion, See assessment plan under pleural effusion

## 2021-05-22 NOTE — ASSESSMENT & PLAN NOTE
Lab Results   Component Value Date    HGBA1C 5 5 05/21/2021       No results for input(s): POCGLU in the last 72 hours      Blood Sugar Average: Last 72 hrs:  ·  maintained without medications

## 2021-05-22 NOTE — H&P
[address] 1960, 57 y o  male MRN: [id number]  Unit/Bed#: E2 -01 Encounter: [id number]  Primary Care Provider: Jennifer Feldman DO   Date and time admitted to hospital: 5/22/2021  2:59 PM    Pleural effusion on right  Assessment & Plan  · Recurrent right-sided pleural effusion  · Presented to the [facility] emergency department with increasing shortness of breath  · Chest x-ray revealed right-sided pleural effusion, IR unavailable at [facility] until Tuesday, transferred to [facility] for IR thoracentesis on 05/23   · Was previously admitted in early March for acute CHF and right-sided thoracentesis   · Transudative fluid during last thora  · This will be the patient's 4th thoracentesis      Acute renal failure superimposed on stage 3b chronic kidney disease [facility])  Assessment & Plan  Lab Results   Component Value Date    EGFR 24 05/22/2021    EGFR 23 05/21/2021    EGFR 52 04/07/2021    CREATININE 2 72 (H) 05/22/2021    CREATININE 2 80 (H) 05/21/2021    CREATININE 1 43 (H) 04/07/2021   · last seen by outpatient nephrology on 3/15  · With baseline creat about 1 6  · Suspect secondary to renovascular congestion and increase in home lasix to 80mg bid in March  · Avoid hypotension  · Nephrology consultation, appreciate recommendations  · Bmp in am    Hyponatremia  Assessment & Plan  · Sodium 129  · Suspect dilutional   · Fluid restriction  · Bmp in am    Acute respiratory failure with hypoxia (HCC)  Assessment & Plan  · Currently requiring 2 liters nasal cannula oxygen   · Denies use of oxygen at home  · Titrate to maintain oxygen saturations greater than 88 percent   · Likely secondary to right-sided pleural effusion, See assessment plan under pleural effusion    Chronic anemia  Assessment & Plan  · With baseline hemoglobin approximately 9 5   · Received call from PCP last evening for hemoglobin of 7 4  · Hemoglobin 7 6 on arrival   · Patient asymptomatic · Transfuse for hemoglobin less than 7   · Patient denies any active bleeding, no hematuria, melena, hematochezia, hematemesis  · Suspect secondary to dilution and CKD  · FOBT  · CBC in a m  Tobacco abuse  Assessment & Plan  · Nicotine patch ordered    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 ascites (HCC)  Assessment & Plan  · Last seen by outpatient GI on 04/08   · [facility] [address]  screening is up-to-date   · Last EGD did not show any esophageal varices   · Likely contributing to his supratherapeutic INR and anemia    STEFF (obstructive sleep apnea)  Assessment & Plan  · CPAP while sleeping    Supratherapeutic INR  Assessment & Plan  · POA, in the setting of long term anticoagulation For paroxysmal AFib   · Patient on Coumadin with INR goal 2-3   · Patient with supratherapeutic INR of 4 point 0 7   · Received oral vitamin K at the [facility] emergency department   · Patient to undergo IR thoracentesis tomorrow if INR is less than 3  · INR in a m  Type 2 diabetes mellitus with diabetic chronic kidney disease [facility])  Assessment & Plan  Lab Results   Component Value Date    HGBA1C 5 5 05/21/2021       No results for input(s): POCGLU in the last 72 hours  Blood Sugar Average: Last 72 hrs:  ·  maintained without medications      Chronic diastolic (congestive) heart failure (HCC)  Assessment & Plan  Wt Readings from Last 3 Encounters:   05/22/21 (!) 136 kg (300 lb 0 7 oz)   04/22/21 (!) 137 kg (302 lb)   04/08/21 127 kg (279 lb 9 6 oz)     · Patient euvolemic on exam  · Followed by Dr Teresa Coffey  · Was admitted in early March for acute on chronic diastolic CHF, diuresed approximately 60 pounds during that admission  · Lasix 80 milligrams b i d    · Metolazone 5 milligrams twice a week  · Daily weights   · Monitor I's and O's          Atrial fibrillation (HCC)  Assessment & Plan  · Permanent atrial fibrillation   · Managed on Coreg 6 25 milligrams b i d     · Chronic anticoagulation with Coumadin 5 milligrams Sunday, Tuesday, Wednesday, Friday, Saturday and 2 5 milligrams Monday and Thursday  · Hold Coumadin given IR thoracentesis tomorrow  · Without RVR  · Managed by Dr Jacqueline Park    VTE Prophylaxis: Pharmacologic VTE Prophylaxis contraindicated due to anemia, supratherapeutic inr  / sequential compression device   Code Status: full code  POLST: There is no POLST form on file for this patient (pre-hospital)  Discussion with family: Discussed plan of care with patient and patients wife, Fadia   Answered any questions  Anticipated Length of Stay:  Patient will be admitted on an Inpatient basis with an anticipated length of stay of  > 2 midnights  Justification for Hospital Stay: pleural effusion, anemia    Total Time for Visit, including Counseling / Coordination of Care: 60 minutes  Greater than 50% of this total time spent on direct patient counseling and coordination of care  Chief Complaint:   Abnormal lab, SOB    History of Present Illness:    Charla Dubin is a 57 y o  male with a pmhx of htn, combined systolic and diastolic CHF, atrial fibrillation anticoagulated on Coumadin, cirrhosis, CKD stage 3, tobacco abuse, lymphedema, obesity who presents with abnormal lab test and shortness of breath  According to the patient, he was evaluated by his cardiologist earlier this week for Coumadin dosing, they noted that his INR was approximately 5  They told him to stop his Coumadin and obtain labs on 05/21  The patient went for labs on 05/21 and received a call last evening stating that his hemoglobin was low and his INR was high  Additionally, the patient has been with worsening shortness of breath over the course of last week  This prompted him to come to the emergency department for further evaluation  While in the emergency department since [facility], the patient underwent a chest x-ray which revealed a right pleural effusion    Additionally, the patient was with acute respiratory failure with hypoxia secondary to the pleural effusion requiring 2 liters nasal cannula oxygen  Hemoglobin on arrival 7 6, with baseline approximately 9  Labs also revealed a creatinine of 2 72 can not INR of 4 07  While in the ED, he received vitamin K and interventional radiology was consulted  Patient was initially seen and evaluated at [facility], however he was transferred to [facility] for interventional radiology thoracentesis on 05/23  Review of Systems:    Review of Systems   Constitutional: Negative for chills, fatigue, fever and unexpected weight change  HENT: Negative for ear pain, rhinorrhea, sinus pain and sore throat  Eyes: Negative for pain and visual disturbance  Respiratory: Positive for shortness of breath  Negative for cough, wheezing and stridor  Cardiovascular: Negative for chest pain, palpitations and leg swelling  Gastrointestinal: Negative for abdominal pain, anal bleeding, blood in stool, constipation, diarrhea, nausea and vomiting  Endocrine: Negative for polydipsia and polyphagia  Genitourinary: Negative for dysuria, flank pain, hematuria and urgency  Musculoskeletal: Negative for arthralgias and back pain  Skin: Negative for color change and rash  Neurological: Negative for dizziness, seizures, syncope, facial asymmetry, weakness, light-headedness, numbness and headaches  Hematological: Does not bruise/bleed easily  All other systems reviewed and are negative  Past Medical and Surgical History:     Past Medical History:   Diagnosis Date    A-fib [facility])     Cardiac disease     Chronic combined systolic (congestive) and diastolic (congestive) heart failure (HCC)     Diabetes mellitus ([facility] [address] )     Dilated cardiomyopathy ([facility] [address] )     History of echocardiogram 11/24/2014    EF 0 30 (30%), Likely mod LV systolic dysfunction  Likely RV dysfunction as well   History of Lexiscan MPI 02/19/2016    EF 0 43 (43%), no prior MI or ischemia      Hx of echocardiogram 04/28/2017    Normal EF, Normal LV systolic function  Mild concentric LV hypertrophy  Mild mitral and tricuspid regurg   Hx: recurrent pneumonia     Hypertension     Long term (current) use of anticoagulants     Morbid (severe) obesity due to excess calories (HCC)     Neuropathy        Past Surgical History:   Procedure Laterality Date    HERNIA REPAIR      IR THORACENTESIS  8/25/2020    IR THORACENTESIS  2/23/2021    SPLENECTOMY, TOTAL      VASCULAR SURGERY Right     leg       Meds/Allergies:    Prior to Admission medications    Medication Sig Start Date End Date Taking? Authorizing Provider   carvedilol (COREG) 6 25 mg tablet Take 1 tablet (6 25 mg total) by mouth 2 (two) times a day with meals  Patient not taking: Reported on 5/22/2021 3/15/21   Altman Clear, CRNP   docusate sodium (COLACE) 100 mg capsule Take 1 capsule (100 mg total) by mouth 2 (two) times a day as needed for constipation  Patient not taking: Reported on 5/22/2021 3/11/21   Edisangel Esteban, DO   ergocalciferol (ERGOCALCIFEROL) 1 25 MG (83362 UT) capsule Take 1 capsule (50,000 Units total) by mouth once a week 3/15/21   Altman Clear, CRNP   ferrous sulfate 324 (65 Fe) mg Take 1 tablet (324 mg total) by mouth 2 (two) times a day before meals  Patient not taking: Reported on 5/22/2021 4/8/21   Maegan Chapa DO   furosemide (LASIX) 80 mg tablet Take 1 tablet (80 mg total) by mouth 2 (two) times a day 3/15/21   Altman Clear, CRNP   glucose blood test strip Test glucose once a day  4/14/21   Stefan Chapa DO   Lancets (freestyle) lancets Test glucose once a day  4/14/21   Stefan Chapa, DO   lisinopril (ZESTRIL) 5 mg tablet Take 1 tablet (5 mg total) by mouth daily 4/30/21   Maegan Chapa DO   metolazone (ZAROXOLYN) 5 mg tablet Take one twice a week or daily prn 3 lb weight gain in 24 hours   5/5/21   Angel Rosa DO   omeprazole (PriLOSEC) 20 mg delayed release capsule Take 1 capsule (20 mg total) by mouth 2 (two) times a day  Patient not taking: Reported on 4/22/2021 3/11/21   Ilean Salts, DO   polyethylene glycol (MIRALAX) 17 g packet Take 17 g by mouth daily as needed (constipation)  Patient not taking: Reported on 5/22/2021 3/11/21   Ilean Salts, DO   potassium chloride (K-DUR,KLOR-CON) 20 mEq tablet Take 3 daily 4/1/21   [place] , DO   warfarin (COUMADIN) 5 mg tablet Take 1/2 to 1 tablet by oral route daily  11/12/20   Freddie Tamez PA-C     I have reviewed home medications with patient personally  Allergies: No Known Allergies    Social History:     Marital Status: /Civil Union   Occupation:  Unknown  Patient Pre-hospital Living Situation:  With wife  Patient Pre-hospital Level of Mobility:  Ambulatory  Patient Pre-hospital Diet Restrictions:  Low salt, fluid restriction  Substance Use History:   Social History     Substance and Sexual Activity   Alcohol Use Yes    Alcohol/week: 14 0 standard drinks    Types: 5 Glasses of wine, 5 Cans of beer, 4 Shots of liquor per week    Frequency: 2-3 times a week    Drinks per session: 5 or 6    Binge frequency: Never    Comment: WEEKLY     Social History     Tobacco Use   Smoking Status Current Some Day Smoker    Packs/day: 0 25    Types: Cigarettes   Smokeless Tobacco Never Used   Tobacco Comment    2-3 CIGARETTES DAILY     Social History     Substance and Sexual Activity   Drug Use Never       Family History:    No family history on file  Physical Exam:     Vitals:   Blood Pressure: 123/76 (05/22/21 1523)  Pulse: 96 (05/22/21 1523)  Temperature: (!) 96 6 °F (35 9 °C) (05/22/21 1523)  Temp Source: Temporal (05/22/21 1523)  Respirations: 18 (05/22/21 1523)  SpO2: 100 % (05/22/21 1523)    Physical Exam  Vitals signs and nursing note reviewed  Constitutional:       General: He is not in acute distress  Appearance: Normal appearance  He is well-developed  He is obese  He is not ill-appearing, toxic-appearing or diaphoretic  HENT:      Head: Normocephalic and atraumatic  Eyes:      General: No scleral icterus  Conjunctiva/sclera: Conjunctivae normal       Pupils: Pupils are equal, round, and reactive to light  Neck:      Musculoskeletal: Neck supple  Cardiovascular:      Rate and Rhythm: Normal rate and regular rhythm  Heart sounds: No murmur  Pulmonary:      Effort: Pulmonary effort is normal  No respiratory distress  Breath sounds: No stridor  No wheezing, rhonchi or rales  Comments: Decreased breath sounds throughout right lung field  Chest:      Chest wall: No tenderness  Abdominal:      General: Abdomen is flat  Bowel sounds are normal  There is no distension  Palpations: Abdomen is soft  Tenderness: There is no abdominal tenderness  There is no guarding or rebound  Musculoskeletal:         General: No swelling  Right lower leg: No edema  Left lower leg: No edema  Skin:     General: Skin is warm and dry  Capillary Refill: Capillary refill takes less than 2 seconds  Coloration: Skin is not jaundiced or pale  Findings: No erythema  Comments: Venous stasis in bilateral lower extremities   Neurological:      General: No focal deficit present  Mental Status: He is alert and oriented to person, place, and time  Mental status is at baseline  Additional Data:     Lab Results: I have personally reviewed pertinent reports     and I have personally reviewed pertinent films in PACS    Results from last 7 days   Lab Units 05/22/21  0850   WBC Thousand/uL 3 74*   HEMOGLOBIN g/dL 7 6*   HEMATOCRIT % 23 4*   PLATELETS Thousands/uL 162   NEUTROS PCT % 66   LYMPHS PCT % 16   MONOS PCT % 12   EOS PCT % 5     Results from last 7 days   Lab Units 05/22/21  0850   SODIUM mmol/L 129*   POTASSIUM mmol/L 4 0   CHLORIDE mmol/L 94*   CO2 mmol/L 30   BUN mg/dL 88*   CREATININE mg/dL 2 72*   ANION GAP mmol/L 5   CALCIUM mg/dL 8 3   ALBUMIN g/dL 3 0*   TOTAL BILIRUBIN mg/dL 0 46   ALK PHOS U/L 133*   ALT U/L 17   AST U/L 20   GLUCOSE RANDOM mg/dL 116     Results from last 7 days   Lab Units 05/22/21  0850   INR  4 07*         Results from last 7 days   Lab Units 05/21/21  0931   HEMOGLOBIN A1C % 5 5     Results from last 7 days   Lab Units 05/22/21  0850   LACTIC ACID mmol/L 1 2       Imaging: I have personally reviewed pertinent reports  and I have personally reviewed pertinent films in PACS    IR chest tube placement    (Results Pending)     Allscripts / Epic Records Reviewed: Yes     ** Please Note: This note has been constructed using a voice recognition system   **

## 2021-05-22 NOTE — ED NOTES
Provider aware of hypotension  No new orders at this time  Xray at bedside performing chest xray        Raymond Jones RN  05/22/21 6670

## 2021-05-22 NOTE — ASSESSMENT & PLAN NOTE
· With baseline hemoglobin approximately 9 5   · Received call from PCP last evening for hemoglobin of 7 4  · Hemoglobin 7 6 on arrival   · Patient asymptomatic   · Transfuse for hemoglobin less than 7   · Patient denies any active bleeding, no hematuria, melena, hematochezia, hematemesis  · Suspect secondary to dilution and CKD  · FOBT  · CBC in a m

## 2021-05-22 NOTE — CONSULTS
INTERPROFESSIONAL (PHONE) 919 [address] 57 y o  male MRN: [id number]  Unit/Bed#: RM03 Encounter: [id number]    IR has been consulted to evaluate the patient, determine the appropriate procedure, and whether or not a procedure can and should be performed regarding the care of [employer]  We were consulted by Dr Ashish Lainez concerning shortness of breath, and to possibly perform a right thoracentesis if medically appropriate for the patient  Consults  05/22/21      Assessment/Recommendation:   Large right pleural effusion  Patient with INR 4 0  Will need to lower it to <3 0 with Vit K  Patient being transferred to [facility] today and will likely perform the procedure early tomorrow morning around 7:30 am thoracentesis the chest she used with 30 due to the transfer lesion likely not get urine to once INR is just a little lower  Will likely place small 8 Swedish chest tube since this will need to be drained over 2 days  SLIM at [facility] to place the order and lab work for thoracentesis  Total time spent in review of data, discussion with requesting provider and rendering advice was 25 minutes  Patient or appropriate family member was verbally informed by Dr Ashish Lainez of this consultative service on their behalf to provide more timely access to specialty care in lieu of an in person consultation  Verbal consent was obtained  Thank you for allowing Interventional Radiology to participate in the care of [employer]  Please don't hesitate to call or TigerText us with any questions       Michael Mays MD

## 2021-05-22 NOTE — ASSESSMENT & PLAN NOTE
· Recurrent right-sided pleural effusion  · Presented to the [facility] emergency department with increasing shortness of breath  · Chest x-ray revealed right-sided pleural effusion, IR unavailable at [facility] until Tuesday, transferred to [facility] for IR thoracentesis on 05/23   · Was previously admitted in early March for acute CHF and right-sided thoracentesis   · Transudative fluid during last thora  · This will be the patient's 4th thoracentesis

## 2021-05-22 NOTE — EMTALA/ACUTE CARE TRANSFER
[address] [facility] EMERGENCY DEPARTMENT  [address] [place] [id number]  Dept: [phone]      EMTALA TRANSFER CONSENT    NAME Clint Miles                                         1960                              MRN [id number]    I have been informed of my rights regarding examination, treatment, and transfer   by Dr Justin Huffman MD    Benefits: Specialized equipment and/or services available at the receiving facility (Include comment)________________________(IR)    Risks: Potential for delay in receiving treatment, Potential deterioration of medical condition, Increased discomfort during transfer      Transfer Request   I acknowledge that my medical condition has been evaluated and explained to me by the emergency department physician or other qualified medical person and/or my attending physician who has recommended and offered to me further medical examination and treatment  I understand the Hospital's obligation with respect to the treatment and stabilization of my emergency medical condition  I nevertheless request to be transferred  I release the Hospital, the doctor, and any other persons caring for me from all responsibility or liability for any injury or ill effects that may result from my transfer and agree to accept all responsibility for the consequences of my choice to transfer, rather than receive stabilizing treatment at the Hospital  I understand that because the transfer is my request, my insurance may not provide reimbursement for the services  The Hospital will assist and direct me and my family in how to make arrangements for transfer, but the hospital is not liable for any fees charged by the transport service  In spite of this understanding, I refuse to consent to further medical examination and treatment which has been offered to me, and request transfer to  [facility], [address] : [place], [place]   I authorize the performance of emergency medical procedures and treatments upon me in both transit and upon arrival at the receiving facility  Additionally, I authorize the release of any and all medical records to the receiving facility and request they be transported with me, if possible  I authorize the performance of emergency medical procedures and treatments upon me in both transit and upon arrival at the receiving facility  Additionally, I authorize the release of any and all medical records to the receiving facility and request they be transported with me, if possible  I understand that the safest mode of transportation during a medical emergency is an ambulance and that the Hospital advocates the use of this mode of transport  Risks of traveling to the receiving facility by car, including absence of medical control, life sustaining equipment, such as oxygen, and medical personnel has been explained to me and I fully understand them  (JOSUE CORRECT BOX BELOW)  [  ]  I consent to the stated transfer and to be transported by ambulance/helicopter  [  ]  I consent to the stated transfer, but refuse transportation by ambulance and accept full responsibility for my transportation by car  I understand the risks of non-ambulance transfers and I exonerate the Hospital and its staff from any deterioration in my condition that results from this refusal     X___________________________________________    DATE  21  TIME________  Signature of patient or legally responsible individual signing on patient behalf           RELATIONSHIP TO PATIENT_________________________          Provider Certification    NAME Janie Remy                                        [facility] 1960                              MRN [id number]    A medical screening exam was performed on the above named patient    Based on the examination:    Condition Necessitating Transfer The primary encounter diagnosis was Acute respiratory failure with hypoxia ([facility] [address] )  Diagnoses of Tachypnea, Shortness of breath, Pleural effusion on right, Diastolic CHF ([facility] [address] ), Acute kidney injury superimposed on chronic kidney disease ([facility] [address] ), Anemia, Anticoagulated on Coumadin, and Supratherapeutic INR were also pertinent to this visit  Patient Condition: The patient has been stabilized such that within reasonable medical probability, no material deterioration of the patient condition or the condition of the unborn child(kasandra) is likely to result from the transfer    Reason for Transfer: Level of Care needed not available at this facility, No bed available at level of patient's needs    Transfer Requirements: [facility], [facility], [facility]   · Space available and qualified personnel available for treatment as acknowledged by    · Agreed to accept transfer and to provide appropriate medical treatment as acknowledged by       Dr Fabi Fay  · Appropriate medical records of the examination and treatment of the patient are provided at the time of transfer   [facility], [address] _______  · Transfer will be performed by qualified personnel from    and appropriate transfer equipment as required, including the use of necessary and appropriate life support measures      Provider Certification: I have examined the patient and explained the following risks and benefits of being transferred/refusing transfer to the patient/family:  General risk, such as traffic hazards, adverse weather conditions, rough terrain or turbulence, possible failure of equipment (including vehicle or aircraft), or consequences of actions of persons outside the control of the transport personnel, Unanticipated needs of medical equipment and personnel during transport, Risk of worsening condition, The possibility of a transport vehicle being unavailable      Based on these reasonable risks and benefits to the patient and/or the unborn child(kasandra), and based upon the information available at the time of the patients examination, I certify that the medical benefits reasonably to be expected from the provision of appropriate medical treatments at another medical facility outweigh the increasing risks, if any, to the individuals medical condition, and in the case of labor to the unborn child, from effecting the transfer      X____________________________________________ DATE 05/22/21        TIME_______      ORIGINAL - SEND TO MEDICAL RECORDS   COPY - SEND WITH PATIENT DURING TRANSFER

## 2021-05-22 NOTE — PLAN OF CARE
Problem: PAIN - ADULT  Goal: Verbalizes/displays adequate comfort level or baseline comfort level  Description: Interventions:  - Encourage patient to monitor pain and request assistance  - Assess pain using appropriate pain scale  - Administer analgesics based on type and severity of pain and evaluate response  - Implement non-pharmacological measures as appropriate and evaluate response  - Consider cultural and social influences on pain and pain management  - Notify physician/advanced practitioner if interventions unsuccessful or patient reports new pain  Outcome: Progressing     Problem: INFECTION - ADULT  Goal: Absence or prevention of progression during hospitalization  Description: INTERVENTIONS:  - Assess and monitor for signs and symptoms of infection  - Monitor lab/diagnostic results  - Monitor all insertion sites, i e  indwelling lines, tubes, and drains  - Monitor endotracheal if appropriate and nasal secretions for changes in amount and color  - [place] appropriate cooling/warming therapies per order  - Administer medications as ordered  - Instruct and encourage patient and family to use good hand hygiene technique  - Identify and instruct in appropriate isolation precautions for identified infection/condition  Outcome: Progressing  Goal: Absence of fever/infection during neutropenic period  Description: INTERVENTIONS:  - Monitor WBC    Outcome: Progressing     Problem: SAFETY ADULT  Goal: Patient will remain free of falls  Description: INTERVENTIONS:  - Assess patient frequently for physical needs  -  Identify cognitive and physical deficits and behaviors that affect risk of falls    -  [place] fall precautions as indicated by assessment   - Educate patient/family on patient safety including physical limitations  - Instruct patient to call for assistance with activity based on assessment  - Modify environment to reduce risk of injury  - Consider OT/PT consult to assist with strengthening/mobility  Outcome: Progressing  Goal: Maintain or return to baseline ADL function  Description: INTERVENTIONS:  -  Assess patient's ability to carry out ADLs; assess patient's baseline for ADL function and identify physical deficits which impact ability to perform ADLs (bathing, care of mouth/teeth, toileting, grooming, dressing, etc )  - Assess/evaluate cause of self-care deficits   - Assess range of motion  - Assess patient's mobility; develop plan if impaired  - Assess patient's need for assistive devices and provide as appropriate  - Encourage maximum independence but intervene and supervise when necessary  - Involve family in performance of ADLs  - Assess for home care needs following discharge   - Consider OT consult to assist with ADL evaluation and planning for discharge  - Provide patient education as appropriate  Outcome: Progressing  Goal: Maintain or return mobility status to optimal level  Description: INTERVENTIONS:  - Assess patient's baseline mobility status (ambulation, transfers, stairs, etc )    - Identify cognitive and physical deficits and behaviors that affect mobility  - Identify mobility aids required to assist with transfers and/or ambulation (gait belt, sit-to-stand, lift, walker, cane, etc )  - [place] fall precautions as indicated by assessment  - Record patient progress and toleration of activity level on Mobility SBAR; progress patient to next Phase/Stage  - Instruct patient to call for assistance with activity based on assessment  - Consider rehabilitation consult to assist with strengthening/weightbearing, etc   Outcome: Progressing     Problem: DISCHARGE PLANNING  Goal: Discharge to home or other facility with appropriate resources  Description: INTERVENTIONS:  - Identify barriers to discharge w/patient and caregiver  - Arrange for needed discharge resources and transportation as appropriate  - Identify discharge learning needs (meds, wound care, etc )  - Arrange for interpretive services to assist at discharge as needed  - Refer to Case Management Department for coordinating discharge planning if the patient needs post-hospital services based on physician/advanced practitioner order or complex needs related to functional status, cognitive ability, or social support system  Outcome: Progressing     Problem: Knowledge Deficit  Goal: Patient/family/caregiver demonstrates understanding of disease process, treatment plan, medications, and discharge instructions  Description: Complete learning assessment and assess knowledge base  Interventions:  - Provide teaching at level of understanding  - Provide teaching via preferred learning methods  Outcome: Progressing     Problem: COPING  Goal: Pt/Family able to verbalize concerns and demonstrate effective coping strategies  Description: INTERVENTIONS:  - Assist patient/family to identify coping skills, available support systems and cultural and spiritual values  - Provide emotional support, including active listening and acknowledgement of concerns of patient and caregivers  - Reduce environmental stimuli, as able  - Provide patient education  - Assess for spiritual pain/suffering and initiate spiritual care, including notification of Pastoral Care or daniel based community as needed  - Assess effectiveness of coping strategies  Outcome: Progressing  Goal: Will report anxiety at manageable levels  Description: INTERVENTIONS:  - Administer medication as ordered  - Teach and encourage coping skills  - Provide emotional support  - Assess patient/family for anxiety and ability to cope  Outcome: Progressing     Problem: Potential for Falls  Goal: Patient will remain free of falls  Description: INTERVENTIONS:  - Assess patient frequently for physical needs  -  Identify cognitive and physical deficits and behaviors that affect risk of falls    -  [place] fall precautions as indicated by assessment   - Educate patient/family on patient safety including physical limitations  - Instruct patient to call for assistance with activity based on assessment  - Modify environment to reduce risk of injury  - Consider OT/PT consult to assist with strengthening/mobility  Outcome: Progressing

## 2021-05-23 PROBLEM — J96.01 ACUTE RESPIRATORY FAILURE WITH HYPOXIA (HCC): Status: RESOLVED | Noted: 2021-02-22 | Resolved: 2021-01-01

## 2021-05-23 NOTE — UTILIZATION REVIEW
Initial Clinical Review    Admission: Date/Time/Statement:   Admission Orders (From admission, onward)     Ordered        05/22/21 1600  Inpatient Admission  Once                   Orders Placed This Encounter   Procedures    Inpatient Admission     Standing Status:   Standing     Number of Occurrences:   1     Order Specific Question:   Level of Care     Answer:   Med Surg [16]     Order Specific Question:   Estimated length of stay     Answer:   More than 2 Midnights     Order Specific Question:   Certification     Answer:   I certify that inpatient services are medically necessary for this patient for a duration of greater than two midnights  See H&P and MD Progress Notes for additional information about the patient's course of treatment  No chief complaint on file  Initial Presentation: 56 yo male presented to [facility] Emergency Department, transferred to [facility] for right pleural effusion and the need for IR treatment   Pmhx of htn, combined systolic and diastolic CHF, atrial fibrillation anticoagulated on Coumadin, cirrhosis, CKD stage 3, tobacco abuse, lymphedema, obesity  Patient c/o SOB that increased over the last day SOB with minimal exertion (+) orhopnea  Wears CPAP at night  Outpatient blood work showed Hgb 7 4  INR high and 1 U FFP and Vitamin K given in [place] ED  Patient taken to IR chest tube inserted with 2 liters of bloody fluid withdrawn  Waterseal tonight and then back to pleurovac suction tomorrow to complete the drainage  Please note that this 4th thoracentesis since early March for acute CHF  Patient 4th Plan Monitor H/H, chest tube and supportive care  Nephrology:  prior baseline creatinine was felt to be around 1 6 mg/dL in euvolemic state  Etiology of acute kidney injury is unclear  May be in the setting of cardiorenal versus less likely obstructive versus auto regulatory failure in the setting of ACE-inhibitor    Blood pressures are marginal  Leading to ATN potentially  Also contributing is possibly anemia  plan hold ACE-inhibitor fluid restriction  IV lasix x 1 with IV albumin x 1        Date: 05-23-21   Day 2: Chest to H2O seal bloody body fliud continues to drain since 4th  Thoracentesis will consult Pulmonology for recommendations  CREATININE level slowing decreasing s/p IV lasix and albumin fluid restriction will continue to hold coumadin due to bloody drainage from chest tube       ED Triage Vitals   Temperature Pulse Respirations Blood Pressure SpO2   05/22/21 1523 05/22/21 1523 05/22/21 1523 05/22/21 1523 05/22/21 1523   (!) 96 6 °F (35 9 °C) 96 18 123/76 100 %      Temp Source Heart Rate Source Patient Position - Orthostatic VS BP Location FiO2 (%)   05/22/21 1523 05/22/21 2044 05/22/21 1523 05/22/21 1523 --   Temporal Monitor Lying Right arm       Pain Score       05/22/21 1515       No Pain          Wt Readings from Last 1 Encounters:   05/23/21 129 kg (283 lb 11 7 oz)     Additional Vital Signs:   Date/Time  Temp  Pulse  Resp  BP  SpO2  O2 Device  Patient Position - Orthostatic VS   05/23/21 0700  97 3 °F (36 3 °C)Abnormal   94  18  93/68  98 %  None (Room air)  Sitting   05/23/21 0622  --  --  --  98/57  --  --  Sitting   05/23/21 0517  --  --  --  105/63  --  --  Sitting   05/23/21 0425  --  --  --  104/64  --  --  --   05/23/21 0301  --  --  --  95/61  --  --  --   05/23/21 0217  --  --  --  103/57  --  --  --   05/23/21 0109  --  109Abnormal   20  92/59  --  --  --   05/23/21 0004  --  111Abnormal   20  99/77  --  None (Room air)  --   05/22/21 2259  --  101  --  108/65  --  --  --   05/22/21 2156  97 7 °F (36 5 °C)  98  18  98/54  98 %  None (Room air)  Lying   05/22/21 2044  97 6 °F (36 4 °C)  108Abnormal   20  94/62  --  --  Sitting   05/22/21 1910  97 8 °F (36 6 °C)  101  20  105/56  97 %  None (Room air)  Lying   05/22/21 1816  97 °F (36 1 °C)Abnormal   110Abnormal   18  104/66  98 %  None (Room          Pertinent Labs/Diagnostic Test Results:       Results from last 7 days   Lab Units 05/23/21  0620 05/23/21  0216 05/22/21  1956 05/22/21  0850 05/21/21  0931   WBC Thousand/uL 4 30*  --   --  3 74* 4 18*   HEMOGLOBIN g/dL 7 1* 7 0* 7 5* 7 6* 7 4*   HEMATOCRIT % 22 6* 21 6* 24 2* 23 4* 23 9*   PLATELETS Thousands/uL 149  --   --  162 170   NEUTROS ABS Thousands/µL 3 21  --   --  2 46 2 97         Results from last 7 days   Lab Units 05/23/21  0620 05/22/21  0850 05/21/21  0931   SODIUM mmol/L 135* 129* 131*   POTASSIUM mmol/L 4 4 4 0 4 2   CHLORIDE mmol/L 96* 94* 96*   CO2 mmol/L 29 30 30   ANION GAP mmol/L 10 5 5   BUN mg/dL 82* 88* 93*   CREATININE mg/dL 2 43* 2 72* 2 80*   EGFR ml/min/1 73sq m 27 24 23   CALCIUM mg/dL 8 8 8 3 8 6   CALCIUM, IONIZED mmol/L  --   --  1 09*   MAGNESIUM mg/dL  --   --  2 3   PHOSPHORUS mg/dL  --   --  5 2*     Results from last 7 days   Lab Units 05/22/21  0850 05/21/21  0931   AST U/L 20 20   ALT U/L 17 16   ALK PHOS U/L 133* 130*   TOTAL PROTEIN g/dL 7 8 7 9   ALBUMIN g/dL 3 0* 3 0*   TOTAL BILIRUBIN mg/dL 0 46 0 67         Results from last 7 days   Lab Units 05/23/21  0620 05/22/21  0850   GLUCOSE RANDOM mg/dL 119 116         Results from last 7 days   Lab Units 05/21/21  0931   HEMOGLOBIN A1C % 5 5   EAG mg/dl 111       Results from last 7 days   Lab Units 05/22/21  0919   PH ALISIA  7 412*   PCO2 ALISIA mm Hg 41 9*   PO2 ALISIA mm Hg 64 4*   HCO3 ALISIA mmol/L 26 1   BASE EXC ALISIA mmol/L 1 3   O2 CONTENT ALISIA ml/dL 10 2   O2 HGB, VENOUS % 86 6*             Results from last 7 days   Lab Units 05/22/21  0850   TROPONIN I ng/mL <0 02         Results from last 7 days   Lab Units 05/23/21  0620 05/23/21 0216 05/22/21  0850   PROTIME seconds 22 8* 25 0* 38 6*   INR  2 07* 2 32* 4 07*     Results from last 7 days   Lab Units 05/22/21  0850   LACTIC ACID mmol/L 1 2     Results from last 7 days   Lab Units 05/22/21  0850   NT-PRO BNP pg/mL 981 0*     Results from last 7 days   Lab Units 05/23/21  0307   CLARITY UA  Clear   COLOR UA  Light Yellow   SPEC GRAV UA  1 015   PH UA  5 5   GLUCOSE UA mg/dl Negative   KETONES UA mg/dl Negative   BLOOD UA  Negative   PROTEIN UA mg/dl Negative   NITRITE UA  Negative   BILIRUBIN UA  Negative   UROBILINOGEN UA E U /dl 0 2   LEUKOCYTES UA  Negative   WBC UA /hpf 0-1*   RBC UA /hpf None Seen   BACTERIA UA /hpf Occasional   EPITHELIAL CELLS WET PREP /hpf Occasional   SODIUM UR  30   CREATININE UR mg/dL 79 0       Results from last 7 days   Lab Units 05/22/21  1738   GRAM STAIN RESULT  Rare Polys  No organisms seen     Results from last 7 days   Lab Units 05/22/21  1738   TOTAL COUNTED  100   WBC FLUID /ul 931     CXR 05-23-21  No pneumothorax  CXR 05-23-21  Large right effusion and right base atelectasis, increased from February 2021  IR chest tube placement  05-22-21  Successful placement of an 8 [place] all-purpose drainage catheter into the right pleural space under ultrasound guidance, yielding 2000cc of bloody pleural fluid  Plan:  Place to waterseal tonight and back to suction tomorrow to slowly drain the very large pleural effusion which is likely over 3 liters   Hold Coumadin for now to lower the INR to therapeutic range  Past Medical History:   Diagnosis Date    [facility]-fib [facility])     Cardiac disease     Chronic combined systolic (congestive) and diastolic (congestive) heart failure (HCC)     Diabetes mellitus ([facility] [address] )     Dilated cardiomyopathy ([facility] [address] )     History of echocardiogram 11/24/2014    EF 0 30 (30%), Likely mod LV systolic dysfunction  Likely RV dysfunction as well   History of Lexiscan MPI 02/19/2016    EF 0 43 (43%), no prior MI or ischemia   Hx of echocardiogram 04/28/2017    Normal EF, Normal LV systolic function  Mild concentric LV hypertrophy  Mild mitral and tricuspid regurg      Hx: recurrent pneumonia     Hypertension     Long term (current) use of anticoagulants     Morbid (severe) obesity due to excess calories (HCC)     Neuropathy      Present on Admission:   Pleural effusion on right   Type 2 diabetes mellitus with diabetic chronic kidney disease (HCC)   Atrial fibrillation (HCC)   Chronic diastolic (congestive) heart failure (HCC)   STEFF (obstructive sleep apnea)   Chronic anemia   Tobacco abuse   (Resolved) Acute respiratory failure with hypoxia (HCC)   Acute renal failure superimposed on stage 3b chronic kidney disease (HCC)   Hyponatremia   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 ascites (HCC)      Admitting Diagnosis: Pleural effusion, right [J90]  Age/Sex: 57 y o  male  Admission Orders:  Scheduled Medications:  carvedilol, 6 25 mg, Oral, BID With Meals  ergocalciferol, 50,000 Units, Oral, Weekly  furosemide, 40 mg, Intravenous, Once  furosemide, 80 mg, Oral, BID (diuretic)  nicotine, 1 patch, Transdermal, Daily      Continuous IV Infusions:     PRN Meds:  acetaminophen, 650 mg, Oral, Q6H PRN  calcium carbonate, 1,000 mg, Oral, Daily PRN  HYDROmorphone, 0 2 mg, Intravenous, Q6H PRN  ondansetron, 4 mg, Intravenous, Q6H PRN  oxyCODONE-acetaminophen, 1 tablet, Oral, Q6H PRN  polyethylene glycol, 17 g, Oral, Daily PRN        IP CONSULT TO NEPHROLOGY  IP CONSULT TO PULMONOLOGY   Chest tube -20 cm H2O seal  Daily wts  I/O  PT/OT  SCD        Network Utilization Review Department  ATTENTION: Please call with any questions or concerns to [phone] and carefully listen to the prompts so that you are directed to the right person  All voicemails are confidential   Jaja Charli all requests for admission clinical reviews, approved or denied determinations and any other requests to dedicated fax number below belonging to the campus where the patient is receiving treatment   List of dedicated fax numbers for the Facilities:  [address] [place] [address] DENIALS (Administrative/Medical Necessity) [phone]   [address] [place] [address] (Maternity/NICU/Pediatrics) [phone]   [address] [address] [facility] [address] [id number] [place] [address] [address] [place], [place] [address] [phone]

## 2021-05-23 NOTE — CONSULTS
Consultation - Pulmonary Medicine   Natividad Estrada 57 y o  male MRN: [id number]  Unit/Bed#: E2 -51 Encounter: [id number]      Physician Requesting Consult: Kaylin Auguste MD  Reason for Consult:  Right pleural effusion      Assessment:  1  Right-sided pleural effusion status post thoracentesis with bloody fluid with increased eosinophils secondary to bloody fluid  2  Acute on chronic diastolic CHF  3  MARISABEL on CKD stage 3  4  Chronic anemia, normocytic and normochromic, partially secondary to chronic kidney disease  5  Tobacco abuse  6  Cirrhosis of unknown etiology without ascites  7  DM-2  8  STEFF on CPAP q h s   9  Severe lower extremity edema, I believe combined lymphedema/venous insufficiency and volume overload  10  Chronic atrial fibrillation on Coumadin at home    Plan:   · Continue chest tube to low wall suction, may need tPA/DNase if decreased drainage before complete drainage of fluid  · To consider VATS if no improvement  · Follow on cytology and microbiology results  · Continue to diurese as per primary team  · Continue CPAP q h s     ______________________________________________________________________    HPI:    Natividad Estrada is a 57 y o  male who presents with abnormal labs including decreased hemoglobin and increased INR while on Coumadin and also shortness of breath  Patient has history of atrial fibrillation on Coumadin, combined systolic and diastolic CHF, cirrhosis of unclear etiology, CKD stage 3 and lymphedema  He denies history of pulmonary disease, no history of asthma or COPD  He smokes few cigarettes a day, he smoked for 50 years, 5 cigarettes per day  Patient has obstructive sleep apnea on CPAP q h s     Patient has recurrent right-sided pleural effusion status post thoracentesis twice in the past with transudative fluid attributed to volume overload, currently underwent thoracentesis of 2 L of bloody fluid and has chest tube in place    Patient complains of mild shortness of breath but me from pain around the chest tube placement  He has significant edema in his lower extremities that gets worse during the day  He is known to have lymphedema  He lives at home with his wife, he has a dog at home, he worked in multiple jobs with no asbestos exposure or other occupational exposure  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  He claims that he lived in [facility] where they had asbestos industry  Pleural fluid in August 2020:  Transudative, negative for malignant cells, negative culture    Pleural fluid in February 2021:  Transudative, negative for malignant cells, positive cultures (polymicrobial)    Pleural fluid currently:  Exudative with neutrophils predominance and increased eosinophils 11%, cytology pending    Review of Systems:  12 points Full review of systems was performed  Aside from what was mentioned in the HPI, it is otherwise negative  Historical Information   Past Medical History:   Diagnosis Date    [facility]-fib [facility])     Cardiac disease     Chronic combined systolic (congestive) and diastolic (congestive) heart failure (HCC)     [facility] mellitus [facility]     Dilated cardiomyopathy [facility] )     History of echocardiogram 11/24/2014    EF 0 30 (30%), Likely mod LV systolic dysfunction  Likely RV dysfunction as well   History of Lexiscan MPI 02/19/2016    EF 0 43 (43%), no prior MI or ischemia   Hx of echocardiogram 04/28/2017    Normal EF, Normal LV systolic function  Mild concentric LV hypertrophy  Mild mitral and tricuspid regurg      Hx: recurrent pneumonia     Hypertension     Long term (current) use of anticoagulants     Morbid (severe) obesity due to excess calories (HCC)     Neuropathy      Past Surgical History:   Procedure Laterality Date    HERNIA REPAIR      IR CHEST TUBE PLACEMENT  5/22/2021    IR THORACENTESIS  8/25/2020    IR THORACENTESIS  2/23/2021    SPLENECTOMY, TOTAL      VASCULAR SURGERY Right     leg     Social History   Social History     Substance and Sexual Activity   Alcohol Use Yes    Alcohol/week: 14 0 standard drinks    Types: 5 Glasses of wine, 5 Cans of beer, 4 Shots of liquor per week    Frequency: 2-3 times a week    Drinks per session: 5 or 6    Binge frequency: Never    Comment: WEEKLY     Social History     Tobacco Use   Smoking Status Current Some Day Smoker    Packs/day: 0 25    Types: Cigarettes   Smokeless Tobacco Never Used   Tobacco Comment    2-3 CIGARETTES DAILY       Occupational history:  No acute patient was    Family History:   No family history on file  No pertinent family history, noncontributory    Medications: The patient's active and prehospital medications were reviewed    Current Facility-Administered Medications   Medication Dose Route Frequency Provider Last Rate    acetaminophen  650 mg Oral Q6H PRN Bill GoldsJAVAD      calcium carbonate  1,000 mg Oral Daily PRN Bill Golds, JAVAD      carvedilol  6 25 mg Oral BID With Meals Ca Parker DO      ergocalciferol  50,000 Units Oral Weekly Bill GoldsJAVAD      furosemide  40 mg Intravenous Once Trini Perez MD      furosemide  80 mg Oral BID (diuretic) Carleen Mederos PA-C      HYDROmorphone  0 2 mg Intravenous Q6H PRN Corey Zee MD      nicotine  1 patch Transdermal Daily Bill GoldsJAVAD      ondansetron  4 mg Intravenous Q6H PRN Bill GoldJAVAD turner      oxyCODONE-acetaminophen  1 tablet Oral Q6H PRN Corey Zee MD      polyethylene glycol  17 g Oral Daily PRN Bill JAVAD Chaudhari           PhysicalExamination:  Vitals:   Vitals:    05/23/21 0533 05/23/21 0534 05/23/21 0622 05/23/21 0700   BP:   98/57 93/68   BP Location:   Left arm Right arm   Pulse:    94   Resp:    18   Temp:    (!) 97 3 °F (36 3 °C)   TempSrc:    Temporal   SpO2:    98%   Weight: 136 kg (299 lb 2 6 oz) 129 kg (283 lb 11 7 oz)       Temp  Min: 96 6 °F (35 9 °C)  Max: 97 9 °F (36 6 °C) SpO2: 98 %,   SpO2 Activity: At Rest,   O2 Device: None (Room air)    General: alert, not in acute distress  HEENT: PERRL, no icteric sclera or cyanosis, no thrush  Neck:  Supple, no lymphadenopathy or thyromegaly, no JVD  Lungs:  Decreased breath sounds at right base with few crackles, left lung clear to auscultation, no wheezing, right chest tube in place  Heart: S1S2 regular, no murmures or gallops  Abdomen:  Obese/ soft, non-tender, bowel sounds  present  Extrimities: + severe edema, no clubbing or cyanosis  Neuro: Alert and oriented x 3, no focal neurodeficits   Skin: intact, no rashes    Diagnostic Data:  CBC:   Results from last 7 days   Lab Units 05/23/21  1231 05/23/21  0620 05/23/21  0216  05/22/21  0850 05/21/21  0931   WBC Thousand/uL  --  4 30*  --   --  3 74* 4 18*   HEMOGLOBIN g/dL 7 4* 7 1* 7 0*   < > 7 6* 7 4*   HEMATOCRIT % 22 8* 22 6* 21 6*   < > 23 4* 23 9*   PLATELETS Thousands/uL  --  149  --   --  162 170    < > = values in this interval not displayed         CMP:   Results from last 7 days   Lab Units 05/23/21  0620 05/22/21  0850 05/21/21  0931   POTASSIUM mmol/L 4 4 4 0 4 2   CHLORIDE mmol/L 96* 94* 96*   CO2 mmol/L 29 30 30   BUN mg/dL 82* 88* 93*   CREATININE mg/dL 2 43* 2 72* 2 80*   CALCIUM mg/dL 8 8 8 3 8 6   ALK PHOS U/L  --  133* 130*   ALT U/L  --  17 16   AST U/L  --  20 20     PT/INR:   Lab Results   Component Value Date    INR 2 07 (H) 05/23/2021    INR 2 32 (H) 05/23/2021   ,     Microbiology:  Results from last 7 days   Lab Units 05/22/21  1738   GRAM STAIN RESULT  Rare Polys  No organisms seen            ABG: No results found for: PHART, MLV6LSP, PO2ART, PUY4ZLW, Z9ZQZKDB, BEART, SOURCE    Imaging:  Chest x-ray reviewed on PACs:  Right-sided pleural effusion with pigtail catheter in place, decreased from before    Chest CT scan from February 2021 reviewed on PACs:  Right large pleural effusion with atelectasis of the right lower lobe, no other significant abnormalities, no lymphadenopathy in the mediastinum    Cardiac lab/EKG/telemetry/ECHO:   Results from last 7 days   Lab Units 05/22/21  0850   TROPONIN I ng/mL <0 02           Echocardiogram:  LVEF 60%, mildly increased wall thickness, moderately dilated RV with mildly reduced systolic function, by estimated peak PA pressure 52     Code Status: Level 1 - Full Code    Raegan Multani MD    Portions of the record may have been created with voice recognition software  Occasional wrong word or "sound a like" substitutions may have occurred due to the inherent limitations of voice recognition software  Read the chart carefully and recognize, using context, where substitutions have occurred

## 2021-05-23 NOTE — ASSESSMENT & PLAN NOTE
Lab Results   Component Value Date    EGFR 27 05/23/2021    EGFR 24 05/22/2021    EGFR 23 05/21/2021    CREATININE 2 43 (H) 05/23/2021    CREATININE 2 72 (H) 05/22/2021    CREATININE 2 80 (H) 05/21/2021     · Last seen by outpatient nephrology on 3/15  · With baseline creat about 1 6  · Suspect secondary to renovascular congestion and increase in home lasix to 80mg bid in March  · Avoid hypotension  · Improving  · S/p lasix 40mg IV and albumin last night  · Nephrology consultation, appreciate recommendations  · Bmp in am

## 2021-05-23 NOTE — ASSESSMENT & PLAN NOTE
· POA, Currently requiring 2 liters nasal cannula oxygen in the setting of r pleural effusion  · Resolved, now on RA  · Denies use of oxygen at home  · Titrate to maintain oxygen saturations greater than 88 percent   · See assessment plan under pleural effusion

## 2021-05-23 NOTE — PROGRESS NOTES
NEPHROLOGY PROGRESS NOTE   Memo Olivares 57 y o  male MRN: [id number]  Unit/Bed#: E2 -01 Encounter: [id number]      ASSESSMENT/PLAN:  1  Acute kidney injury, POA:  Possibly cardiorenal syndrome versus ATN in the setting of ACE-inhibitor, anemia and soft blood pressures  · Creatinine today improving from 2 8 on admission to 2 4 today  · UA bland except 0-1 WBC  · Urine sodium 30, urine chloride 41, urine creatinine 79, urine urea 643  · Status post Lasix 40 mg IV x 1 + albumin last night  2  CKD stage 3:  Baseline creatinine around 1 6 and follows with Dr Jeane Schreiber  3  Volume overload with large right pleural effusion:  Status post IV Lasix and thoracentesis  Dry weight around 283 lb and on standing scale today was 283 lb  · At home was on Lasix 80 mg b i d  And metolazone 5 mg twice a week  · Status post thoracentesis and now with right chest tube in place  · Restart oral Lasix since he is now off oxygen back to his dry weight  4  Hyponatremia:  Likely due to volume overload and sodium improved to 135 today with volume removal  · Continue 1500 cc per day oral fluid restriction  5  Anemia:  Hemoglobin fairly stable at 7 1  6  Cirrhosis:  Suspect alcohol versus fatty liver versus cardiac related      Plan Summary:    Lasix 80 mg p o  B i d    Check a m  BMP    SUBJECTIVE:  Feeling much better with shortness of breath resolved  Edema stable and at baseline      OBJECTIVE:  Current Weight: Weight - Scale: 129 kg (283 lb 11 7 oz)  Vitals:    05/23/21 0700   BP: 93/68   Pulse: 94   Resp: 18   Temp: (!) 97 3 °F (36 3 °C)   SpO2: 98%       Intake/Output Summary (Last 24 hours) at 5/23/2021 1017  Last data filed at 5/23/2021 0330  Gross per 24 hour   Intake 733 ml   Output 3150 ml   Net -2417 ml     General:  No acute distress  Skin:  No rash  Eyes:  Sclerae anicteric  ENT:  Moist mucous membranes  Neck:  Trachea midline   Chest:  Decreased breath sounds right base  CVS:  Regular rate and rhythm  Abdomen:  Soft, nontender, obese  Extremities:  Significant bilateral chronic edema  Neuro:  Awake and alert  Psych:  Appropriate affect]      Medications:  Scheduled Meds:  Current Facility-Administered Medications   Medication Dose Route Frequency Provider Last Rate    acetaminophen  650 mg Oral Q6H PRN Lion Edwards PA-C      calcium carbonate  1,000 mg Oral Daily PRN Lion Edwards PA-C      carvedilol  6 25 mg Oral BID With Meals Ca Solano DO      ergocalciferol  50,000 Units Oral Weekly Lion Edwards PA-C      furosemide  40 mg Intravenous Once Pillo Joshi MD      HYDROmorphone  0 2 mg Intravenous Q6H PRN Ro Rivera MD      nicotine  1 patch Transdermal Daily Lion Edwards PA-C      ondansetron  4 mg Intravenous Q6H PRN Lion Edwards PA-C      oxyCODONE-acetaminophen  1 tablet Oral Q6H PRN Ro Rivera MD      polyethylene glycol  17 g Oral Daily PRN Lion Edwards PA-C         PRN Meds:   acetaminophen    calcium carbonate    HYDROmorphone    ondansetron    oxyCODONE-acetaminophen    polyethylene glycol    Laboratory Results:  Results from last 7 days   Lab Units 05/23/21  0620 05/23/21  0216 05/22/21  1956 05/22/21  0850 05/21/21  0931   WBC Thousand/uL 4 30*  --   --  3 74* 4 18*   HEMOGLOBIN g/dL 7 1* 7 0* 7 5* 7 6* 7 4*   HEMATOCRIT % 22 6* 21 6* 24 2* 23 4* 23 9*   PLATELETS Thousands/uL 149  --   --  162 170   SODIUM mmol/L 135*  --   --  129* 131*   POTASSIUM mmol/L 4 4  --   --  4 0 4 2   CHLORIDE mmol/L 96*  --   --  94* 96*   CO2 mmol/L 29  --   --  30 30   BUN mg/dL 82*  --   --  88* 93*   CREATININE mg/dL 2 43*  --   --  2 72* 2 80*   CALCIUM mg/dL 8 8  --   --  8 3 8 6   MAGNESIUM mg/dL  --   --   --   --  2 3   PHOSPHORUS mg/dL  --   --   --   --  5 2*

## 2021-05-23 NOTE — ASSESSMENT & PLAN NOTE
· Last seen by outpatient GI on 04/08   · [facility]  screening is up-to-date   · Last EGD did not show any esophageal varices   · Likely contributing to his supratherapeutic INR and anemia  · Suspect fatty liver vs cardiac in nature

## 2021-05-23 NOTE — PLAN OF CARE
Problem: PAIN - ADULT  Goal: Verbalizes/displays adequate comfort level or baseline comfort level  Description: Interventions:  - Encourage patient to monitor pain and request assistance  - Assess pain using appropriate pain scale  - Administer analgesics based on type and severity of pain and evaluate response  - Implement non-pharmacological measures as appropriate and evaluate response  - Consider cultural and social influences on pain and pain management  - Notify physician/advanced practitioner if interventions unsuccessful or patient reports new pain  Outcome: Progressing     Problem: INFECTION - ADULT  Goal: Absence or prevention of progression during hospitalization  Description: INTERVENTIONS:  - Assess and monitor for signs and symptoms of infection  - Monitor lab/diagnostic results  - Monitor all insertion sites, i e  indwelling lines, tubes, and drains  - Monitor endotracheal if appropriate and nasal secretions for changes in amount and color  - [place] appropriate cooling/warming therapies per order  - Administer medications as ordered  - Instruct and encourage patient and family to use good hand hygiene technique  - Identify and instruct in appropriate isolation precautions for identified infection/condition  Outcome: Progressing  Goal: Absence of fever/infection during neutropenic period  Description: INTERVENTIONS:  - Monitor WBC    Outcome: Progressing     Problem: SAFETY ADULT  Goal: Patient will remain free of falls  Description: INTERVENTIONS:  - Assess patient frequently for physical needs  -  Identify cognitive and physical deficits and behaviors that affect risk of falls    -  [place] fall precautions as indicated by assessment   - Educate patient/family on patient safety including physical limitations  - Instruct patient to call for assistance with activity based on assessment  - Modify environment to reduce risk of injury  - Consider OT/PT consult to assist with strengthening/mobility  Outcome: Progressing  Goal: Maintain or return to baseline ADL function  Description: INTERVENTIONS:  -  Assess patient's ability to carry out ADLs; assess patient's baseline for ADL function and identify physical deficits which impact ability to perform ADLs (bathing, care of mouth/teeth, toileting, grooming, dressing, etc )  - Assess/evaluate cause of self-care deficits   - Assess range of motion  - Assess patient's mobility; develop plan if impaired  - Assess patient's need for assistive devices and provide as appropriate  - Encourage maximum independence but intervene and supervise when necessary  - Involve family in performance of ADLs  - Assess for home care needs following discharge   - Consider OT consult to assist with ADL evaluation and planning for discharge  - Provide patient education as appropriate  Outcome: Progressing  Goal: Maintain or return mobility status to optimal level  Description: INTERVENTIONS:  - Assess patient's baseline mobility status (ambulation, transfers, stairs, etc )    - Identify cognitive and physical deficits and behaviors that affect mobility  - Identify mobility aids required to assist with transfers and/or ambulation (gait belt, sit-to-stand, lift, walker, cane, etc )  - [place] fall precautions as indicated by assessment  - Record patient progress and toleration of activity level on Mobility SBAR; progress patient to next Phase/Stage  - Instruct patient to call for assistance with activity based on assessment  - Consider rehabilitation consult to assist with strengthening/weightbearing, etc   Outcome: Progressing     Problem: DISCHARGE PLANNING  Goal: Discharge to home or other facility with appropriate resources  Description: INTERVENTIONS:  - Identify barriers to discharge w/patient and caregiver  - Arrange for needed discharge resources and transportation as appropriate  - Identify discharge learning needs (meds, wound care, etc )  - Arrange for interpretive services to assist at discharge as needed  - Refer to Case Management Department for coordinating discharge planning if the patient needs post-hospital services based on physician/advanced practitioner order or complex needs related to functional status, cognitive ability, or social support system  Outcome: Progressing     Problem: Knowledge Deficit  Goal: Patient/family/caregiver demonstrates understanding of disease process, treatment plan, medications, and discharge instructions  Description: Complete learning assessment and assess knowledge base  Interventions:  - Provide teaching at level of understanding  - Provide teaching via preferred learning methods  Outcome: Progressing     Problem: COPING  Goal: Pt/Family able to verbalize concerns and demonstrate effective coping strategies  Description: INTERVENTIONS:  - Assist patient/family to identify coping skills, available support systems and cultural and spiritual values  - Provide emotional support, including active listening and acknowledgement of concerns of patient and caregivers  - Reduce environmental stimuli, as able  - Provide patient education  - Assess for spiritual pain/suffering and initiate spiritual care, including notification of Pastoral Care or daniel based community as needed  - Assess effectiveness of coping strategies  Outcome: Progressing  Goal: Will report anxiety at manageable levels  Description: INTERVENTIONS:  - Administer medication as ordered  - Teach and encourage coping skills  - Provide emotional support  - Assess patient/family for anxiety and ability to cope  Outcome: Progressing     Problem: Potential for Falls  Goal: Patient will remain free of falls  Description: INTERVENTIONS:  - Assess patient frequently for physical needs  -  Identify cognitive and physical deficits and behaviors that affect risk of falls    -  [place] fall precautions as indicated by assessment   - Educate patient/family on patient safety including physical limitations  - Instruct patient to call for assistance with activity based on assessment  - Modify environment to reduce risk of injury  - Consider OT/PT consult to assist with strengthening/mobility  Outcome: Progressing

## 2021-05-23 NOTE — ASSESSMENT & PLAN NOTE
· Recurrent right-sided pleural effusion  · Presented to the [facility] emergency department with increasing shortness of breath  · Chest x-ray revealed right-sided pleural effusion, IR unavailable at [facility] until Tuesday, transferred to [facility] for IR thoracentesis   · Thoracentesis performed on 05/ cc bloody fluid removed   · Pleura vac in place with bloody fluid  · Was previously admitted in early March for acute CHF and right-sided thoracentesis   · Transudative fluid during last thora  · This will be the patient's 4th thoracentesis  · Fluid studies pending  · Pulmonology consulted, appreciate recommendations

## 2021-05-23 NOTE — ASSESSMENT & PLAN NOTE
· Permanent atrial fibrillation   · Managed on Coreg 6 25 milligrams b i d     · Chronic anticoagulation with Coumadin 5 milligrams Sunday, Tuesday, Wednesday, Friday, Saturday and 2 5 milligrams Monday and Thursday  · Coumadin on hold secondary to IR thoracentesis-will continue to hold for today given patient's continuous bloody effusion  · Without RVR  · Managed by Dr Poornima Arguello

## 2021-05-23 NOTE — ASSESSMENT & PLAN NOTE
· With baseline hemoglobin approximately 9 5   · Received call from PCP last evening for hemoglobin of 7 4  · Hemoglobin 7 6 on arrival   · Patient asymptomatic   · Transfuse for hemoglobin less than 7   · Suspect secondary to dilution and CKD  · FOBT  · CBC in a m    · Most recent hgb 7 1-stable

## 2021-05-23 NOTE — ASSESSMENT & PLAN NOTE
Wt Readings from Last 3 Encounters:   05/23/21 129 kg (283 lb 11 7 oz)   05/22/21 (!) 136 kg (300 lb 0 7 oz)   04/22/21 (!) 137 kg (302 lb)     · Patient euvolemic on exam  · Followed by Dr Guillermo Lacy  · Was admitted in early March for acute on chronic diastolic CHF, diuresed approximately 60 pounds during that admission  · Lasix 80 milligrams b i d    · Metolazone 5 milligrams twice a week  · Dry weight 283--> same today  · Daily weights   · Monitor I's and O's

## 2021-05-23 NOTE — ASSESSMENT & PLAN NOTE
· POA, in the setting of long term anticoagulation For paroxysmal AFib   · Patient on Coumadin with INR goal 2-3   · Patient with supratherapeutic INR of 4 07  · Received oral vitamin K at the [facility] emergency department   · Most recent INR 2 07--> will hold coumadin one more day given continuous blood effusion drainage

## 2021-05-23 NOTE — PROGRESS NOTES
[facility]  Progress Note - Yuniel Estimable 1960, 57 y o  male MRN: [id number]  Unit/Bed#: E2 -01 Encounter: [id number]  Primary Care Provider: Norberto Langford DO   Date and time admitted to hospital: 5/22/2021  2:59 PM    * Pleural effusion on right  Assessment & Plan  · Recurrent right-sided pleural effusion  · Presented to the [facility] emergency department with increasing shortness of breath  · Chest x-ray revealed right-sided pleural effusion, IR unavailable at [facility] until Tuesday, transferred to [facility] for IR thoracentesis   · Thoracentesis performed on 05/ cc bloody fluid removed   · Pleura vac in place with bloody fluid  · Was previously admitted in early March for acute CHF and right-sided thoracentesis   · Transudative fluid during last thora  · This will be the patient's 4th thoracentesis  · Fluid studies pending  · Pulmonology consulted, appreciate recommendations      Acute renal failure superimposed on stage 3b chronic kidney disease [facility])  Assessment & Plan  Lab Results   Component Value Date    EGFR 27 05/23/2021    EGFR 24 05/22/2021    EGFR 23 05/21/2021    CREATININE 2 43 (H) 05/23/2021    CREATININE 2 72 (H) 05/22/2021    CREATININE 2 80 (H) 05/21/2021     · Last seen by outpatient nephrology on 3/15  · With baseline creat about 1 6  · Suspect secondary to renovascular congestion and increase in home lasix to 80mg bid in March  · Avoid hypotension  · Improving  · S/p lasix 40mg IV and albumin last night  · Nephrology consultation, appreciate recommendations  · Bmp in am    Hyponatremia  Assessment & Plan  · Sodium 129 on admission-->improved 135  · Suspect dilutional   · Fluid restriction  · Bmp in am    Chronic anemia  Assessment & Plan  · With baseline hemoglobin approximately 9 5   · Received call from PCP last evening for hemoglobin of 7 4  · Hemoglobin 7 6 on arrival   · Patient asymptomatic   · Transfuse for hemoglobin less than 7 · Suspect secondary to dilution and CKD  · FOBT  · CBC in a m  · Most recent hgb 7 1-stable    Tobacco abuse  Assessment & Plan  · Nicotine patch ordered    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 ascites (HCC)  Assessment & Plan  · Last seen by outpatient GI on 04/08   · [place] [address]  screening is up-to-date   · Last EGD did not show any esophageal varices   · Likely contributing to his supratherapeutic INR and anemia  · Suspect fatty liver vs cardiac in nature    STEFF (obstructive sleep apnea)  Assessment & Plan  · CPAP while sleeping    Supratherapeutic INR  Assessment & Plan  · POA, in the setting of long term anticoagulation For paroxysmal AFib   · Patient on Coumadin with INR goal 2-3   · Patient with supratherapeutic INR of 4 07  · Received oral vitamin K at the [facility] emergency department   · Most recent INR 2 07--> will hold coumadin one more day given continuous blood effusion drainage    Type 2 diabetes mellitus with diabetic chronic kidney disease ([facility] )  Assessment & Plan  Lab Results   Component Value Date    HGBA1C 5 5 05/21/2021       No results for input(s): POCGLU in the last 72 hours  Blood Sugar Average: Last 72 hrs:  ·  maintained without medications      Chronic diastolic (congestive) heart failure (HCC)  Assessment & Plan  Wt Readings from Last 3 Encounters:   05/23/21 129 kg (283 lb 11 7 oz)   05/22/21 (!) 136 kg (300 lb 0 7 oz)   04/22/21 (!) 137 kg (302 lb)     · Patient euvolemic on exam  · Followed by Dr Zach Evans  · Was admitted in early March for acute on chronic diastolic CHF, diuresed approximately 60 pounds during that admission  · Lasix 80 milligrams b i d    · Metolazone 5 milligrams twice a week  · Dry weight 283--> same today  · Daily weights   · Monitor I's and O's          Atrial fibrillation (HCC)  Assessment & Plan  · Permanent atrial fibrillation   · Managed on Coreg 6 25 milligrams b i d     · Chronic anticoagulation with Coumadin 5 milligrams Sunday, Tuesday, Wednesday, Friday, Saturday and 2 5 milligrams Monday and Thursday  · Coumadin on hold secondary to IR thoracentesis-will continue to hold for today given patient's continuous bloody effusion  · Without RVR  · Managed by Dr Balbir Flannery    Acute respiratory failure with hypoxia (HCC)-resolved as of 2021  Assessment & Plan  · POA, Currently requiring 2 liters nasal cannula oxygen in the setting of r pleural effusion  · Resolved, now on RA  · Denies use of oxygen at home  · Titrate to maintain oxygen saturations greater than 88 percent   · See assessment plan under pleural effusion      VTE Pharmacologic Prophylaxis:   Pharmacologic: Pharmacologic VTE Prophylaxis contraindicated due to anemia  Mechanical VTE Prophylaxis in Place: Yes    Patient Centered Rounds: I have performed bedside rounds with nursing staff today  Discussions with Specialists or Other Care Team Provider: IR, nephrology    Education and Discussions with Family / Patient: Discussed plan of care with patient  When asked If I can update his wife, he politely declined and stated that he would update her  Time Spent for Care: 30 minutes  More than 50% of total time spent on counseling and coordination of care as described above  Current Length of Stay: 1 day(s)    Current Patient Status: Inpatient   Certification Statement: The patient will continue to require additional inpatient hospital stay due to draining pleural effusion    Discharge Plan: likely to home within 24-48    Code Status: Level 1 - Full Code      Subjective:   Patient states that he is feeling significantly better today  States that he is able to breathe without oxygen today  Denies any chest pain, palpitations, nausea, vomiting, diarrhea, constipation, increasing leg swelling      Objective:     Vitals:   Temp (24hrs), Av 3 °F (36 3 °C), Min:96 6 °F (35 9 °C), Max:97 8 °F (36 6 °C)    Temp:  [96 6 °F (35 9 °C)-97 8 °F (36 6 °C)] 97 3 °F (36 3 °C)  HR:  [] 94  Resp:  [16-29] 18  BP: ()/(54-95) 93/68  SpO2:  [97 %-100 %] 98 %  Body mass index is 39 57 kg/m²  Input and Output Summary (last 24 hours): Intake/Output Summary (Last 24 hours) at 5/23/2021 1149  Last data filed at 5/23/2021 0330  Gross per 24 hour   Intake 733 ml   Output 3150 ml   Net -2417 ml       Physical Exam:     Physical Exam  Vitals signs and nursing note reviewed  Constitutional:       General: He is not in acute distress  Appearance: Normal appearance  He is well-developed  He is obese  He is not ill-appearing or diaphoretic  HENT:      Head: Normocephalic and atraumatic  Eyes:      General: No scleral icterus  Conjunctiva/sclera: Conjunctivae normal       Pupils: Pupils are equal, round, and reactive to light  Neck:      Musculoskeletal: Neck supple  Cardiovascular:      Rate and Rhythm: Normal rate and regular rhythm  Heart sounds: No murmur  No friction rub  No gallop  Pulmonary:      Effort: Pulmonary effort is normal  No respiratory distress  Breath sounds: No stridor  No wheezing, rhonchi or rales  Comments: Decreased breath sounds in lower right lung field  Chest:      Chest wall: No tenderness  Abdominal:      General: Abdomen is flat  Bowel sounds are normal  There is no distension  Palpations: Abdomen is soft  Tenderness: There is no abdominal tenderness  There is no guarding or rebound  Musculoskeletal:      Right lower leg: Edema present  Left lower leg: Edema present  Comments: 1+   Skin:     General: Skin is warm and dry  Capillary Refill: Capillary refill takes less than 2 seconds  Coloration: Skin is not jaundiced or pale  Findings: No erythema  Neurological:      General: No focal deficit present  Mental Status: He is alert and oriented to person, place, and time  Mental status is at baseline           Additional Data:     Labs:    Results from last 7 days   Lab Units 05/23/21  0620   WBC Thousand/uL 4 30* HEMOGLOBIN g/dL 7 1*   HEMATOCRIT % 22 6*   PLATELETS Thousands/uL 149   NEUTROS PCT % 73   LYMPHS PCT % 11*   MONOS PCT % 11   EOS PCT % 3     Results from last 7 days   Lab Units 05/23/21  0620 05/22/21  0850   SODIUM mmol/L 135* 129*   POTASSIUM mmol/L 4 4 4 0   CHLORIDE mmol/L 96* 94*   CO2 mmol/L 29 30   BUN mg/dL 82* 88*   CREATININE mg/dL 2 43* 2 72*   ANION GAP mmol/L 10 5   CALCIUM mg/dL 8 8 8 3   ALBUMIN g/dL  --  3 0*   TOTAL BILIRUBIN mg/dL  --  0 46   ALK PHOS U/L  --  133*   ALT U/L  --  17   AST U/L  --  20   GLUCOSE RANDOM mg/dL 119 116     Results from last 7 days   Lab Units 05/23/21  0620   INR  2 07*         Results from last 7 days   Lab Units 05/21/21  0931   HEMOGLOBIN A1C % 5 5     Results from last 7 days   Lab Units 05/22/21  0850   LACTIC ACID mmol/L 1 2           * I Have Reviewed All Lab Data Listed Above  * Additional Pertinent Lab Tests Reviewed:  Zac [facility] Admission Reviewed    Imaging:    Imaging Reports Reviewed Today Include: none  Imaging Personally Reviewed by Myself Includes:  none    Recent Cultures (last 7 days):     Results from last 7 days   Lab Units 05/22/21  1738   GRAM STAIN RESULT  Rare Polys  No organisms seen       Last 24 Hours Medication List:   Current Facility-Administered Medications   Medication Dose Route Frequency Provider Last Rate    acetaminophen  650 mg Oral Q6H PRN Jimmy Guy PA-C      calcium carbonate  1,000 mg Oral Daily PRN Jimmy Guy PA-C      carvedilol  6 25 mg Oral BID With Meals Ca Omalley DO      ergocalciferol  50,000 Units Oral Weekly Jimmy Guy PA-C      furosemide  40 mg Intravenous Once James Ortiz MD      furosemide  80 mg Oral BID (diuretic) Melania Jorge PA-C      HYDROmorphone  0 2 mg Intravenous Q6H PRN Christiano Goyal MD      nicotine  1 patch Transdermal Daily Jimmy Guy PA-C      ondansetron  4 mg Intravenous Q6H PRN Jimmy Guy PA-C      oxyCODONE-acetaminophen  1 tablet Oral Q6H PRN Obie Owusu MD      polyethylene glycol  17 g Oral Daily PRN JAVAD Dickerson          Today, Patient Was Seen By: JAVAD Dickerson    ** Please Note: Dictation voice to text software may have been used in the creation of this document   **

## 2021-05-24 PROBLEM — E87.1 HYPONATREMIA: Status: RESOLVED | Noted: 2021-01-01 | Resolved: 2021-01-01

## 2021-05-24 NOTE — PHYSICAL THERAPY NOTE
PT EVALUATION    57 y o     [id number]    Pleural effusion, right [J90]    Past Medical History:   Diagnosis Date    A-fib [facility])     Cardiac disease     Chronic combined systolic (congestive) and diastolic (congestive) heart failure (HCC)     Diabetes mellitus ([facility] )     Dilated cardiomyopathy ([facility] )     History of echocardiogram 11/24/2014    EF 0 30 (30%), Likely mod LV systolic dysfunction  Likely RV dysfunction as well   History of Lexiscan MPI 02/19/2016    EF 0 43 (43%), no prior MI or ischemia   Hx of echocardiogram 04/28/2017    Normal EF, Normal LV systolic function  Mild concentric LV hypertrophy  Mild mitral and tricuspid regurg   Hx: recurrent pneumonia     Hypertension     Long term (current) use of anticoagulants     Morbid (severe) obesity due to excess calories (HCC)     Neuropathy          Past Surgical History:   Procedure Laterality Date    HERNIA REPAIR      IR CHEST TUBE PLACEMENT  5/22/2021    IR THORACENTESIS  8/25/2020    IR THORACENTESIS  2/23/2021    SPLENECTOMY, TOTAL      VASCULAR SURGERY Right     leg        05/24/21 0920   PT Last Visit   PT Visit Date 05/24/21   Note Type   Note type Evaluation   Pain Assessment   Pain Score 1   Home Living   Type of Home House   Home Layout Two level;Bed/bath upstairs;Stairs to enter with rails  (4 EZEKIEL)   Bathroom Shower/Tub Tub/shower unit   Bathroom Toilet Standard   Bathroom Equipment   (none)   [address]  (urinal )   Additional Comments drives  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  Prior Function   Level of [place] Independent with ADLs and functional mobility   Lives With Spouse   Receives Help From Family   ADL Assistance Independent   IADLs Independent  (shares with spouse  )   Falls in the last 6 months 1 to 4  (1)   Vocational Part time employment   Comments I PTA without AD use       Restrictions/Precautions   Weight Bearing Precautions Per Order No   Other Precautions Fall Risk;Multiple lines;Pain  (chest tube )   General   Additional Pertinent History Pt is 56 y/o male admitted with acute respiratory failure related to R pleural effusion, acute CHF   + anemia and MARISABEL on CKD  PT consulted  Up and OOB as tolerated orders  Family/Caregiver Present No   Cognition   Overall Cognitive Status WFL   Orientation Level Oriented X4   Following Commands Follows all commands and directions without difficulty   Comments Pleasant  RUE Assessment   RUE Assessment WFL  (as observed with functional reach and grasp)   LUE Assessment   LUE Assessment WFL  (as observed with functional reach and grasp )   RLE Assessment   RLE Assessment WFL  (grossly 4-/5)   LLE Assessment   LLE Assessment WFL  (grossly 4-/5)   Bed Mobility   Additional Comments received sitting OOB in recliner chair  Transfers   Sit to Stand 5  Supervision   Additional items Increased time required   Stand to Sit 5  Supervision   Additional items Increased time required   Additional Comments Wide NICOLLE, increased time to complete  Ambulation/Elevation   Gait pattern Improper Weight shift;Decreased foot clearance; Wide NICOLLE; Inconsistent jia; Short stride   Gait Assistance 5  Supervision   Additional items Verbal cues   Assistive Device None   Distance Amb without AD 25'x1  O2 sat 100%, -130  Balance   Static Sitting Good   Dynamic Sitting Good   Static Standing Fair   Dynamic Standing Fair -   Ambulatory Fair -   Endurance Deficit   Endurance Deficit Yes   Endurance Deficit Description fatigue  Activity Tolerance   Activity Tolerance Patient tolerated treatment well;Patient limited by fatigue   Medical Staff Made Aware [facility]   Nurse Made Aware yes   Assessment   Prognosis Good   Problem List Decreased strength;Decreased endurance; Impaired balance;Decreased mobility;Obesity; Decreased skin integrity;Pain   Assessment Solomon Buck is a 57 y o  male with a pmhx of htn, combined systolic and diastolic CHF, atrial fibrillation anticoagulated on Coumadin, cirrhosis, CKD stage 3, tobacco abuse, lymphedema, obesity who presents with abnormal lab test and shortness of breath  R pleural effusion and acute respiratory failure, anemia, acute renal failure on CKD, acute on chronic CHF  Transferred from [facility] to [address] [place] [address] for thoracentesis and chest tube placement on 5/22  PT consulted  Up and OOB as tolerated orders  Prior to admission was independent without AD use  Working part time and driving  Able to ambulate community distances prior but with pacing  Currently presents with functional limitations related to hospitalization with decreased activity tolerance compared to baseline, decreased strength, balance, functional mobility and locomotion  Despite is S for transfers and ambulation short distances without AD use  Gait slowed, decreased foot clearance and step length with wide NICOLLE  No overt LOB  -130 with activity  RA O2 sat 100%  Given impairments with decline from functional baseline will benefit from skilled PT in order to optimize outcomes and facilitate return to independence  The patient's AM-PAC Basic Mobility Inpatient Short Form Raw Score is 22, Standardized Score is 47 4  A standardized score greater than 42 9 suggests the patient may benefit from discharge to home  Please also refer to the recommendation of the Physical Therapist for safe discharge planning  Anticipate ability to return home with family support when medically stable  Barriers to Discharge Inaccessible home environment   Goals   Patient Goals to get back to walking the dog  STG Expiration Date 06/03/21   Short Term Goal #1 10 days: 1)  Pt will perform bed mobility with Emily demonstrating appropriate technique 100% of the time in order to improve function  2)  Perform all transfers with Emily demonstrating safe and appropriate technique 100% of the time in order to improve ability to negotiate safely in home environment  3) Amb with least restrictive AD prn > 200'x1 with mod I to I in order to demonstrate ability to negotiate in home environment  4)  Improve overall strength and balance 1/2 grade in order to optimize ability to perform functional tasks and reduce fall risk  5) Increase activity tolerance to 45 minutes in order to improve endurance to functional tasks  6)  Negotiate stairs using most appropriate technique and Emily in order to be able to negotiate safely in home environment  7) PT for ongoing patient and family/caregiver education, DME needs and d/c planning in order to promote highest level of function in least restrictive environment  Plan   Treatment/Interventions Functional transfer training;LE strengthening/ROM; Elevations; Therapeutic exercise; Endurance training;Patient/family training;Equipment eval/education; Bed mobility;Gait training; Compensatory technique education;Spoke to nursing;Continued evaluation;OT   PT Frequency 2-3x/wk   Recommendation   PT Discharge Recommendation No rehabilitation needs   AM-PAC Basic Mobility Inpatient   Turning in Bed Without Bedrails 3   Lying on Back to Sitting on Edge of Flat Bed 4   Moving Bed to Chair 4   Standing Up From Chair 4   Walk in Room 4   Climb 3-5 Stairs 3   Basic Mobility Inpatient Raw Score 22   Basic Mobility Standardized Score 47 4     History: co - morbidities, fall risk,  multiple lines, steps  Exam: impairments in locomotion, musculoskeletal, balance,posture,skin integrity, cardiac, pulmonary,    Clinical: unstable/unpredictable ( fall risk, pain, decreased activity tolerance compared to baseline, ongoing medical management of current conditions, trending labs)  Complexity:high    Richard Ibarra, PT

## 2021-05-24 NOTE — ASSESSMENT & PLAN NOTE
· With baseline hemoglobin approximately 9 5   · Received call from PCP last evening for hemoglobin of 7 4  · Hemoglobin 7 6 on arrival   · Patient asymptomatic   · Transfuse for hemoglobin less than 7   · Suspect secondary to dilution and CKD  · FOBT  · CBC in a m    · Most recent hgb 7 7-stable

## 2021-05-24 NOTE — ASSESSMENT & PLAN NOTE
· Recurrent right-sided pleural effusion  · Presented to the [facility] emergency department with increasing shortness of breath  · Chest x-ray revealed right-sided pleural effusion, IR unavailable at [facility] until Tuesday, transferred to [facility] for IR thoracentesis   · Thoracentesis performed on 05/ cc bloody fluid removed   · Pleura vac in place with bloody fluid  · Was previously admitted in early March for acute CHF and right-sided thoracentesis   · Previous thoracentesis showed transudative fluid  · Current pleural fluid-exudate and felt predominance greasy is pills still pending cytology  · Pulmonology consulted, appreciate recommendations  · Continue chest tube to low wall suction, potential for tPA/DNase if drainage decreases before complete resolution per pulmonology consultations  · Per pulmonology, consideration of VATS if no improvement

## 2021-05-24 NOTE — PROGRESS NOTES
Progress Note - Pulmonary   Steve Blackman 57 y o  male MRN: [id number]  Unit/Bed#: E2 -01 Encounter: [id number]      Assessment/ Plan :  1   right-sided pleural effusion status post thoracentesis and chest tube  ·  patient underwent thoracentesis and removed 2000 cc of fluid  Chest tube was ultimately placed  He has low glucose of 55 on pleural fluid analysis and pH of 8 5  He has 931 wbc's with 11% eosinophils, otherwise this is neutrophil predominant  ·   Chest tube has drained approximately 1 7 L since insertion  ·   Chest x-ray this a m  shows persistent right-sided effusion  ·   At this time I am unsure if we should give patient another day of chest tube to wall suction to see if this evacuates fluid however it does appear sick and bloody in nature  Could possibly consider tPA/ DNase to help break up loculations and let him effusion drain  Will discuss with my attending physician  ·  could also consider repeat CT scan of the chest for further evaluation  Patient is nontoxic appearing he is hemodynamically stable and he is on room air  2   acute on chronic diastolic CHF    3  MARISABEL and CKD stage 3    4   chronic anemia, normocytic and normochromic likely secondary to kidney disease    5   tobacco abuse  ·   Continue to encourage smoking cessation  Continue nicotine replacement therapy with nicotine patch while here in the hospital    6   cirrhosis    7  type 2 diabetes    8  STEFF on CPAP    9   chronic atrial fibrillation on Coumadin        Subjective:    Mr Justus Sarmiento is out of bed in the chair upon my evaluation today  He offers no complaints  He denies any significant events overnight  He is having some slight pain at the chest tube insertion site in his low back  He is still having some difficulty breathing  He reports that he has been having some mild shortness of breath with minimal exertion  He otherwise offers no complaints and denies any significant events overnight    He denies any fevers or chills  Objective:   Vitals: Blood pressure 106/60, pulse 102, temperature (!) 97 3 °F (36 3 °C), temperature source Temporal, resp  rate 18, height 5' 11" (1 803 m), weight 129 kg (284 lb 6 3 oz), SpO2 100 %  ,  Room air, Body mass index is 39 66 kg/m²  Intake/Output Summary (Last 24 hours) at 5/24/2021 1442  Last data filed at 5/24/2021 1200  Gross per 24 hour   Intake 240 ml   Output 1845 ml   Net -1605 ml         Physical Exam  Gen: Awake, alert, oriented x 3, no acute distress  HEENT: Mucous membranes moist, no oral lesions, no thrush  NECK: No accessory muscle use, JVP not elevated  Cardiac: Regular, single S1, single S2, no murmurs, no rubs, no gallops  Lungs:   Breath sounds are decreased bilaterally throughout all lung fields without any wheezes, rales, rhonchi  Abdomen: normoactive bowel sounds, soft nontender, nondistended, no rebound or rigidity, no guarding  Extremities: no cyanosis, no clubbing, no edema    Labs: I have personally reviewed pertinent lab results  , ABG: No results found for: PHART, RVL3KWQ, PO2ART, ZWM9BFH, N6MHUQNO, BEART, SOURCE, BNP: No results found for: BNP, CBC:   Lab Results   Component Value Date    WBC 4 39 05/24/2021    HGB 7 7 (L) 05/24/2021    HCT 25 0 (L) 05/24/2021    MCV 97 05/24/2021     05/24/2021    MCH 30 0 05/24/2021    MCHC 30 8 (L) 05/24/2021    RDW 18 0 (H) 05/24/2021    MPV 9 8 05/24/2021   , CMP:   Lab Results   Component Value Date    SODIUM 137 05/24/2021    K 4 3 05/24/2021    CL 97 (L) 05/24/2021    CO2 30 05/24/2021    BUN 80 (H) 05/24/2021    CREATININE 2 36 (H) 05/24/2021    CALCIUM 9 4 05/24/2021    EGFR 28 05/24/2021   , PT/INR:   Lab Results   Component Value Date    INR 1 44 (H) 05/24/2021   , Troponin: No results found for: TROPONINI     Imaging and other studies: I have personally reviewed pertinent reports  CXR 5/22/21  FINDINGS:     Cardiomediastinal silhouette normal      Right pigtail insertion    Decrease in right effusion, still moderate, with right base atelectasis    No pneumothorax      Osseous structures normal for age      IMPRESSION:     Decrease in right effusion after pigtail insertion, but still moderate, with right base atelectasis      No pneumothorax        Verashley Mccray, PA-C

## 2021-05-24 NOTE — ASSESSMENT & PLAN NOTE
· Permanent atrial fibrillation   · Managed on Coreg 6 25 milligrams b i d     · Chronic anticoagulation with Coumadin 5 milligrams Sunday, Tuesday, Wednesday, Friday, Saturday and 2 5 milligrams Monday and Thursday  · Coumadin on hold secondary to IR thoracentesis-will continue to hold for today given patient's continuous bloody effusion  · Without RVR  · Managed by Dr Jacqueline Park

## 2021-05-24 NOTE — PLAN OF CARE
Problem: PHYSICAL THERAPY ADULT  Goal: Performs mobility at highest level of function for planned discharge setting  See evaluation for individualized goals  Description: Treatment/Interventions: Functional transfer training, LE strengthening/ROM, Elevations, Therapeutic exercise, Endurance training, Patient/family training, Equipment eval/education, Bed mobility, Gait training, Compensatory technique education, Spoke to nursing, Continued evaluation, OT          See flowsheet documentation for full assessment, interventions and recommendations  Note: Prognosis: Good  Problem List: Decreased strength, Decreased endurance, Impaired balance, Decreased mobility, Obesity, Decreased skin integrity, Pain  Assessment: Patrizia Gillespie is a 57 y o  male with a pmhx of htn, combined systolic and diastolic CHF, atrial fibrillation anticoagulated on Coumadin, cirrhosis, CKD stage 3, tobacco abuse, lymphedema, obesity who presents with abnormal lab test and shortness of breath  R pleural effusion and acute respiratory failure, anemia, acute renal failure on CKD, acute on chronic CHF  Transferred from [facility] to [address] [place] [address] for thoracentesis and chest tube placement on 5/22  PT consulted  Up and OOB as tolerated orders  Prior to admission was independent without AD use  Working part time and driving  Able to ambulate community distances prior but with pacing  Currently presents with functional limitations related to hospitalization with decreased activity tolerance compared to baseline, decreased strength, balance, functional mobility and locomotion  Despite is S for transfers and ambulation short distances without AD use  Gait slowed, decreased foot clearance and step length with wide NICOLLE  No overt LOB  -130 with activity  RA O2 sat 100%  Given impairments with decline from functional baseline will benefit from skilled PT in order to optimize outcomes and facilitate return to independence    The patient's AM-PAC Basic Mobility Inpatient Short Form Raw Score is 22, Standardized Score is 47 4  A standardized score greater than 42 9 suggests the patient may benefit from discharge to home  Please also refer to the recommendation of the Physical Therapist for safe discharge planning  Anticipate ability to return home with family support when medically stable  Barriers to Discharge: Inaccessible home environment        PT Discharge Recommendation: No rehabilitation needs          See flowsheet documentation for full assessment

## 2021-05-24 NOTE — ASSESSMENT & PLAN NOTE
· Last seen by outpatient GI on 04/08   · [facility]  screening is up-to-date   · Last EGD did not show any esophageal varices   · Likely contributing to his supratherapeutic INR and anemia  · Suspect fatty liver vs cardiac in nature

## 2021-05-24 NOTE — PLAN OF CARE
Problem: OCCUPATIONAL THERAPY ADULT  Goal: Performs self-care activities at highest level of function for planned discharge setting  See evaluation for individualized goals  Description: Treatment Interventions: ADL retraining, Functional transfer training, UE strengthening/ROM, Endurance training, Patient/family training, Compensatory technique education          See flowsheet documentation for full assessment, interventions and recommendations  Note: Limitation: Decreased UE strength, Decreased endurance, Decreased Safe judgement during ADL, Decreased high-level ADLs  Prognosis: Good  Assessment: Pt is a 60y/o male admitted to the hospital(x-marisol from [facility]) 2* abnormal lab values(i e low hemoglobin, high INR)  Pt noted with recurrent R pleural effusion, requiring a s/p thoracentesis(5/23), chest tube placement(5/22)  Pt with PMH A-fib, DM, CHF, cardiomyopathy, neuropathy, splenectomry, and prior thoracentesis  PTA pt states independence with all aspects of his ADLs, transfers, ambulation--w/o device; +falls=1, neg home alone, +  During initial eval, pt demonstrated slight deficits with his functional balance, functional mobility, ADL status, transfer safety, b/l UE strength, and activity tolerance(currently fair=15-20mins)  Pt would benefit from 1-5 OT tx sessions for the above deficits  OT Discharge Recommendation: (home with family support)  OT - OK to Discharge:  Yes

## 2021-05-24 NOTE — ASSESSMENT & PLAN NOTE
Wt Readings from Last 3 Encounters:   05/24/21 129 kg (284 lb 6 3 oz)   05/22/21 (!) 136 kg (300 lb 0 7 oz)   04/22/21 (!) 137 kg (302 lb)     · Patient euvolemic on exam  · Followed by Dr Avila Thacker  · Was admitted in early March for acute on chronic diastolic CHF, diuresed approximately 60 pounds during that admission  · Lasix 80 milligrams b i d    · Metolazone 5 milligrams twice a week  · Dry weight 283--> same today  · Daily weights   · Monitor I's and O's

## 2021-05-24 NOTE — PROGRESS NOTES
[address] [place] [address]  Progress Note - Lizette Boyd 1960, 57 y o  male MRN: [id number]  Unit/Bed#: E2 -01 Encounter: [id number]  Primary Care Provider: eVro Carrero DO   Date and time admitted to hospital: 5/22/2021  2:59 PM    * Pleural effusion on right  Assessment & Plan  · Recurrent right-sided pleural effusion  · Presented to the [facility] emergency department with increasing shortness of breath  · Chest x-ray revealed right-sided pleural effusion, IR unavailable at [facility] until Tuesday, transferred to [facility] for IR thoracentesis   · Thoracentesis performed on 05/ cc bloody fluid removed   · Pleura vac in place with bloody fluid  · Was previously admitted in early March for acute CHF and right-sided thoracentesis   · Previous thoracentesis showed transudative fluid  · Current pleural fluid-exudate and felt predominance greasy is pills still pending cytology  · Pulmonology consulted, appreciate recommendations  · Continue chest tube to low wall suction, potential for tPA/DNase if drainage decreases before complete resolution per pulmonology consultations  · Per pulmonology, consideration of VATS if no improvement      Acute renal failure superimposed on stage 3b chronic kidney disease [facility])  Assessment & Plan  Lab Results   Component Value Date    EGFR 28 05/24/2021    EGFR 27 05/23/2021    EGFR 24 05/22/2021    CREATININE 2 36 (H) 05/24/2021    CREATININE 2 43 (H) 05/23/2021    CREATININE 2 72 (H) 05/22/2021     · Last seen by outpatient nephrology on 3/15  · With baseline creat about 1 6  · Suspect secondary to renovascular congestion and increase in home lasix to 80mg bid in March  · Hold ACE-i  · Avoid hypotension  · Improving  · S/p lasix 40mg IV and albumin on 5/22  · Nephrology consultation, appreciate recommendations  · Bmp in am    Chronic anemia  Assessment & Plan  · With baseline hemoglobin approximately 9 5   · Received call from PCP last evening for hemoglobin of 7 4  · Hemoglobin 7 6 on arrival   · Patient asymptomatic   · Transfuse for hemoglobin less than 7   · Suspect secondary to dilution and CKD  · FOBT  · CBC in a m  · Most recent hgb 7 7-stable    Tobacco abuse  Assessment & Plan  · Nicotine patch ordered    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 ascites (HCC)  Assessment & Plan  · Last seen by outpatient GI on 04/08   · [place] [id number]  screening is up-to-date   · Last EGD did not show any esophageal varices   · Likely contributing to his supratherapeutic INR and anemia  · Suspect fatty liver vs cardiac in nature    STEFF (obstructive sleep apnea)  Assessment & Plan  · CPAP while sleeping    Supratherapeutic INR  Assessment & Plan  · POA, in the setting of long term anticoagulation For paroxysmal AFib   · Patient on Coumadin with INR goal 2-3   · Patient with supratherapeutic INR of 4 07  · Received oral vitamin K at the [facility] emergency department   · Most recent INR 2 07--> will recheck  · Continue to hold Coumadin in the setting of continuous bloody effusion    Type 2 diabetes mellitus with diabetic chronic kidney disease ([facility] )  Assessment & Plan  Lab Results   Component Value Date    HGBA1C 5 5 05/21/2021       No results for input(s): POCGLU in the last 72 hours      Blood Sugar Average: Last 72 hrs:  ·  maintained without medications      Chronic diastolic (congestive) heart failure (HCC)  Assessment & Plan  Wt Readings from Last 3 Encounters:   05/24/21 129 kg (284 lb 6 3 oz)   05/22/21 (!) 136 kg (300 lb 0 7 oz)   04/22/21 (!) 137 kg (302 lb)     · Patient euvolemic on exam  · Followed by Dr Balbir Flannery  · Was admitted in early March for acute on chronic diastolic CHF, diuresed approximately 60 pounds during that admission  · Lasix 80 milligrams b i d    · Metolazone 5 milligrams twice a week  · Dry weight 283--> same today  · Daily weights   · Monitor I's and O's          Atrial fibrillation (HCC)  Assessment & Plan  · Permanent atrial fibrillation   · Managed on Coreg 6 25 milligrams b i d  · Chronic anticoagulation with Coumadin 5 milligrams , Tuesday, Wednesday, Friday, Saturday and 2 5 milligrams Monday and Thursday  · Coumadin on hold secondary to IR thoracentesis-will continue to hold for today given patient's continuous bloody effusion  · Without RVR  · Managed by Dr Teresa Coffey    Hyponatremia-resolved as of 2021  Assessment & Plan  · Sodium 129 on admission-->improved 137  · resolved  · Suspect dilutional   · Fluid restriction  · Bmp in am      VTE Pharmacologic Prophylaxis:   Pharmacologic: Pharmacologic VTE Prophylaxis contraindicated due to continuous bloody draininge from chest tube  Mechanical VTE Prophylaxis in Place: Yes    Patient Centered Rounds: I have performed bedside rounds with nursing staff today  Discussions with Specialists or Other Care Team Provider: pulm    Education and Discussions with Family / Patient:  Discussed plan of care with patient, stated that he would update his wife  Answered any questions  Time Spent for Care: 20 minutes  More than 50% of total time spent on counseling and coordination of care as described above  Current Length of Stay: 2 day(s)    Current Patient Status: Inpatient   Certification Statement: The patient will continue to require additional inpatient hospital stay due to Continue chest tube, MARISABEL    Discharge Plan: Within 24-28 hrs pending clinical improvement and chest tube removal    Code Status: Level 1 - Full Code      Subjective:   Patient states that his shortness of breath is improved since first admitted, the same since yesterday  Denies any chest pain, palpitations, abdominal pain, nausea, vomiting, constipation, increase in leg swelling  Is in good spirits      Objective:     Vitals:   Temp (24hrs), Av 3 °F (36 3 °C), Min:97 2 °F (36 2 °C), Max:97 3 °F (36 3 °C)    Temp:  [97 2 °F (36 2 °C)-97 3 °F (36 3 °C)] 97 3 °F (36 3 °C)  HR:  [] 102  Resp:  [18] 18  BP: ()/(58-78) 106/60  SpO2:  [96 %-100 %] 100 %  Body mass index is 39 66 kg/m²  Input and Output Summary (last 24 hours): Intake/Output Summary (Last 24 hours) at 5/24/2021 1215  Last data filed at 5/24/2021 4248  Gross per 24 hour   Intake 240 ml   Output 1545 ml   Net -1305 ml       Physical Exam:     Physical Exam  Vitals signs and nursing note reviewed  Constitutional:       General: He is not in acute distress  Appearance: He is well-developed  He is obese  He is not ill-appearing, toxic-appearing or diaphoretic  HENT:      Head: Normocephalic and atraumatic  Eyes:      General: No scleral icterus  Conjunctiva/sclera: Conjunctivae normal    Neck:      Musculoskeletal: Neck supple  Cardiovascular:      Rate and Rhythm: Normal rate and regular rhythm  Heart sounds: No murmur  No friction rub  No gallop  Pulmonary:      Effort: Pulmonary effort is normal  No respiratory distress  Breath sounds: No stridor  No wheezing, rhonchi or rales  Comments: Decreased breath sounds throughout entire right lung field  Chest:      Chest wall: No tenderness  Abdominal:      General: Abdomen is flat  Bowel sounds are normal  There is no distension  Palpations: Abdomen is soft  Tenderness: There is no abdominal tenderness  Musculoskeletal:      Right lower leg: Edema present  Left lower leg: Edema present  Comments: 1+ pitting   Skin:     General: Skin is warm and dry  Capillary Refill: Capillary refill takes less than 2 seconds  Coloration: Skin is not jaundiced or pale  Findings: No erythema  Neurological:      General: No focal deficit present  Mental Status: He is alert and oriented to person, place, and time  Mental status is at baseline         Additional Data:     Labs:    Results from last 7 days   Lab Units 05/24/21  0728  05/23/21  0620   WBC Thousand/uL 4 39  --  4 30*   HEMOGLOBIN g/dL 7 7*   < > 7 1*   HEMATOCRIT % 25 0*   < > 22 6* PLATELETS Thousands/uL 173  --  149   NEUTROS PCT %  --   --  73   LYMPHS PCT %  --   --  11*   MONOS PCT %  --   --  11   EOS PCT %  --   --  3    < > = values in this interval not displayed  Results from last 7 days   Lab Units 05/24/21  0728  05/22/21  0850   SODIUM mmol/L 137   < > 129*   POTASSIUM mmol/L 4 3   < > 4 0   CHLORIDE mmol/L 97*   < > 94*   CO2 mmol/L 30   < > 30   BUN mg/dL 80*   < > 88*   CREATININE mg/dL 2 36*   < > 2 72*   ANION GAP mmol/L 10   < > 5   CALCIUM mg/dL 9 4   < > 8 3   ALBUMIN g/dL  --   --  3 0*   TOTAL BILIRUBIN mg/dL  --   --  0 46   ALK PHOS U/L  --   --  133*   ALT U/L  --   --  17   AST U/L  --   --  20   GLUCOSE RANDOM mg/dL 108   < > 116    < > = values in this interval not displayed  Results from last 7 days   Lab Units 05/23/21  0620   INR  2 07*         Results from last 7 days   Lab Units 05/21/21  0931   HEMOGLOBIN A1C % 5 5     Results from last 7 days   Lab Units 05/22/21  0850   LACTIC ACID mmol/L 1 2           * I Have Reviewed All Lab Data Listed Above  * Additional Pertinent Lab Tests Reviewed:  Zac [facility] Admission Reviewed    Imaging:    Imaging Reports Reviewed Today Include: none  Imaging Personally Reviewed by Myself Includes:  none    Recent Cultures (last 7 days):     Results from last 7 days   Lab Units 05/22/21  1738   GRAM STAIN RESULT  Rare Polys  No organisms seen   BODY FLUID CULTURE, STERILE  No growth       Last 24 Hours Medication List:   Current Facility-Administered Medications   Medication Dose Route Frequency Provider Last Rate    acetaminophen  650 mg Oral Q6H PRN Manuelito Hathaway PA-C      calcium carbonate  1,000 mg Oral Daily PRN Manuelito Hathaway PA-C      carvedilol  6 25 mg Oral BID With Meals Ca Waggoner DO      ergocalciferol  50,000 Units Oral Weekly Manuelito Hathaway PA-C      furosemide  40 mg Intravenous Once Fabienne Homans, MD      furosemide  80 mg Oral BID (diuretic) Rachell Durham PA-C      HYDROmorphone  0 2 mg Intravenous Q6H PRN Jossue Mahoney MD      nicotine  1 patch Transdermal Daily [address] [place], [place]      ondansetron  4 mg Intravenous Q6H  [facility]      oxyCODONE-acetaminophen  1 tablet Oral Q6H PRN Jossue Mahoney MD      polyethylene glycol  17 g Oral Daily  [facility]          Today, Patient Was Seen By: [address], PA-C    ** Please Note: Dictation voice to text software may have been used in the creation of this document   **

## 2021-05-24 NOTE — ASSESSMENT & PLAN NOTE
· Sodium 129 on admission-->improved 137  · resolved  · Suspect dilutional   · Fluid restriction  · Bmp in am

## 2021-05-24 NOTE — ASSESSMENT & PLAN NOTE
Lab Results   Component Value Date    EGFR 28 05/24/2021    EGFR 27 05/23/2021    EGFR 24 05/22/2021    CREATININE 2 36 (H) 05/24/2021    CREATININE 2 43 (H) 05/23/2021    CREATININE 2 72 (H) 05/22/2021     · Last seen by outpatient nephrology on 3/15  · With baseline creat about 1 6  · Suspect secondary to renovascular congestion and increase in home lasix to 80mg bid in March  · Hold ACE-i  · Avoid hypotension  · Improving  · S/p lasix 40mg IV and albumin on 5/22  · Nephrology consultation, appreciate recommendations  · Bmp in am

## 2021-05-24 NOTE — PROGRESS NOTES
NEPHROLOGY PROGRESS NOTE   Jennifer Cos 57 y o  male MRN: [id number]  Unit/Bed#: E2 -01 Encounter: [id number]      ASSESSMENT & PLAN:  1  MARISABEL on top of CKD stage 3, baseline Cr around 1 6, follows with Dr Jose Cruz Edwards    MARISABEL 2/2 CRS, prior ACE-I, hypotension, anemia that lead to ATN  Cr on admission 2 8, down to 2 36 today  Keep holding ACE-I, avoid relative hypotension  Home diuretics were restarted yesterday but patient did not received it because low BP  Follow daily weight and I/O  Follow daily labs    2  Volume overload with right pleural effusion, back on home diuretics  S/p thoracentesis and now with CT in place  Pulmonology in board    3  Hemodynamics, chronic hypotension  Asymptomatic    4  Cirrhosis suspected ETOH vs fatty liver vs cardiac cirrhosis    5  Anemia, follow H/H and transfuse if Hgb < 7        SUBJECTIVE:  Patient seen and examined, no significant complaints other than discomfort from CT    SOB improving    OBJECTIVE:  Current Weight: Weight - Scale: 129 kg (284 lb 6 3 oz)  Vitals:    05/24/21 0718   BP: 106/60   Pulse: 102   Resp: 18   Temp: (!) 97 3 °F (36 3 °C)   SpO2: 100%       Intake/Output Summary (Last 24 hours) at 5/24/2021 1059  Last data filed at 5/24/2021 7577  Gross per 24 hour   Intake 240 ml   Output 1945 ml   Net -1705 ml     General: conscious, cooperative, in not acute distress  Eyes: conjunctivae pink, anicteric sclerae  ENT: lips and mucous membranes moist  Neck: supple, no JVD  Chest: decreased breath sounds over right base, no crackles, ronchus or wheezing, Right sided CT in place  CVS: distinct S1 & S2, normal rate, regular rhythm  Abdomen: soft, non-tender, non-distended, normoactive bowel sounds  Extremities: 2+ edema of both legs  Skin: no rash  Neuro: awake, alert, oriented        Medications:    Current Facility-Administered Medications:     acetaminophen (TYLENOL) tablet 650 mg, 650 mg, Oral, Q6H PRN, Regla Lopez PA-C    calcium carbonate (TUMS) chewable tablet 1,000 mg, 1,000 mg, Oral, Daily PRN, George Lyon PA-C    carvedilol (COREG) tablet 6 25 mg, 6 25 mg, Oral, BID With Meals, Ca Rg DO    ergocalciferol (VITAMIN D2) capsule 50,000 Units, 50,000 Units, Oral, Weekly, George Lyon PA-C    furosemide (LASIX) injection 40 mg, 40 mg, Intravenous, Once, Kristyn Quevedo MD    furosemide (LASIX) tablet 80 mg, 80 mg, Oral, BID (diuretic), Ruperto Lewis PA-C    HYDROmorphone HCl (DILAUDID) injection 0 2 mg, 0 2 mg, Intravenous, Q6H PRN, Heidy Rouse MD, 0 2 mg at 05/23/21 0019    nicotine (NICODERM CQ) 14 mg/24hr TD 24 hr patch 1 patch, 1 patch, Transdermal, Daily, George Lyon PA-C    ondansetron [facility]) injection 4 mg, 4 mg, Intravenous, Q6H PRN, George Lyon PA-C    oxyCODONE-acetaminophen (PERCOCET) 5-325 mg per tablet 1 tablet, 1 tablet, Oral, Q6H PRN, Heidy Rouse MD, 1 tablet at 05/24/21 1054    polyethylene glycol (MIRALAX) packet 17 g, 17 g, Oral, Daily PRN, George Lyon PA-C    Invasive Devices:        Lab Results:   Results from last 7 days   Lab Units 05/24/21  0728 05/23/21  1231 05/23/21  0620  05/22/21  0850 05/21/21  0931   WBC Thousand/uL 4 39  --  4 30*  --  3 74* 4 18*   HEMOGLOBIN g/dL 7 7* 7 4* 7 1*   < > 7 6* 7 4*   HEMATOCRIT % 25 0* 22 8* 22 6*   < > 23 4* 23 9*   PLATELETS Thousands/uL 173  --  149  --  162 170   SODIUM mmol/L 137  --  135*  --  129* 131*   POTASSIUM mmol/L 4 3  --  4 4  --  4 0 4 2   CHLORIDE mmol/L 97*  --  96*  --  94* 96*   CO2 mmol/L 30  --  29  --  30 30   BUN mg/dL 80*  --  82*  --  88* 93*   CREATININE mg/dL 2 36*  --  2 43*  --  2 72* 2 80*   CALCIUM mg/dL 9 4  --  8 8  --  8 3 8 6   MAGNESIUM mg/dL  --   --   --   --   --  2 3   PHOSPHORUS mg/dL  --   --   --   --   --  5 2*   ALK PHOS U/L  --   --   --   --  133* 130*   ALT U/L  --   --   --   --  17 16   AST U/L  --   --   --   --  20 20    < > = values in this interval not displayed         Previous work up:  See previous notes      Portions of the record may have been created with voice recognition software  Occasional wrong word or "sound a like" substitutions may have occurred due to the inherent limitations of voice recognition software  Read the chart carefully and recognize, using context, where substitutions have occurred  If you have any questions, please contact the dictating provider

## 2021-05-24 NOTE — ASSESSMENT & PLAN NOTE
· POA, in the setting of long term anticoagulation For paroxysmal AFib   · Patient on Coumadin with INR goal 2-3   · Patient with supratherapeutic INR of 4 07  · Received oral vitamin K at the [facility] emergency department   · Most recent INR 2 07--> will recheck  · Continue to hold Coumadin in the setting of continuous bloody effusion

## 2021-05-24 NOTE — OCCUPATIONAL THERAPY NOTE
Occupational Therapy Evaluation(time=0910-0930)     Patient Name: Troy Ko  [id number]'[id number] Date: 5/24/2021  Problem List  Principal Problem:    Pleural effusion on right  Active Problems:    Atrial fibrillation (HCC)    Chronic diastolic (congestive) heart failure (HCC)    Type 2 diabetes mellitus with diabetic chronic kidney disease (HCC)    Supratherapeutic INR    STEFF (obstructive sleep apnea)    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 ascites (HCC)    Tobacco abuse    Chronic anemia    Acute renal failure superimposed on stage 3b chronic kidney disease ([facility] [address] )    Past Medical History  Past Medical History:   Diagnosis Date    A-fib [facility])     Cardiac disease     Chronic combined systolic (congestive) and diastolic (congestive) heart failure (HCC)     Diabetes mellitus ([facility] )     Dilated cardiomyopathy ([facility] )     History of echocardiogram 11/24/2014    EF 0 30 (30%), Likely mod LV systolic dysfunction  Likely RV dysfunction as well   History of Lexiscan MPI 02/19/2016    EF 0 43 (43%), no prior MI or ischemia   Hx of echocardiogram 04/28/2017    Normal EF, Normal LV systolic function  Mild concentric LV hypertrophy  Mild mitral and tricuspid regurg   Hx: recurrent pneumonia     Hypertension     Long term (current) use of anticoagulants     Morbid (severe) obesity due to excess calories (HCC)     Neuropathy      Past Surgical History  Past Surgical History:   Procedure Laterality Date    HERNIA REPAIR      IR CHEST TUBE PLACEMENT  5/22/2021    IR THORACENTESIS  8/25/2020    IR THORACENTESIS  2/23/2021    SPLENECTOMY, TOTAL      VASCULAR SURGERY Right     leg             05/24/21 0930   Note Type   Note type Evaluation   Restrictions/Precautions   Weight Bearing Precautions Per Order No   Other Precautions Multiple lines; Fall Risk;Pain  (chest tube)   Pain Assessment   Pain Assessment Tool 0-10   Pain Score 1   Pain Location/Orientation   (chest tube site)   Home Living   Type of [address] [place] [address] Two level;Bed/bath upstairs  (13 steps to 2nd, 4 batsheva with railing)   Bathroom Shower/Tub Tub/shower unit   Bathroom Toilet Standard   Home Equipment Cane   Prior Function   Lives With Spouse   ADL Assistance Independent   Falls in the last 6 months 1 to 4  (1)   Lifestyle   Autonomy PTA pt states independence with all aspects of his ADLs, transfers, ambulation--w/o device; +falls=1, neg home alone, +   Reciprocal Relationships supportive wife   Service to Others works 20hrs/wk for [address] owns a dog   Psychosocial   Psychosocial (WDL) WDL   Subjective   Subjective "I've been walking around the room "   ADL   Where Assessed Chair   Eating Assistance 6  Modified independent   Grooming Assistance 6  [address]  Supervision/Setup   LB Bathing Assistance 5  Supervision/Setup   [address] 5  Supervision/Setup   LB Dressing Assistance 5  Supervision/Setup   Transfers   Sit to Stand 5  Supervision   Additional items Verbal cues   Stand to Sit 5  Supervision   Additional items Verbal cues   Functional Mobility   Functional Mobility 5  Supervision   Additional items   (w/o device)   Balance   Static Sitting Good   Dynamic Sitting Good   Static Standing Fair   Dynamic Standing Fair -   Activity Tolerance   Activity Tolerance Patient tolerated treatment well   Medical Staff Made Aware nsg, P T     RUE Assessment   RUE Assessment WFL   RUE Strength   RUE Overall Strength Within Functional Limits - able to perform ADL tasks with strength  (4/5 throughout)   LUE Assessment   LUE Assessment WFL   LUE Strength   LUE Overall Strength Within Functional Limits - able to perform ADL tasks with strength  (4/5 throughout)   Hand Function   Gross Motor Coordination Functional   Fine Motor Coordination Functional   Sensation   Light Touch No apparent deficits   Proprioception   Proprioception No apparent deficits   Vision-Basic Assessment   Current Vision Wears glasses all the time   Vision - Complex Assessment   Acuity Able to read clock/calendar on wall without difficulty   Perception   Inattention/Neglect Appears intact   Cognition   Overall Cognitive Status WFL   Arousal/Participation Alert   Attention Within functional limits   Orientation Level Oriented X4   Memory Within functional limits   Following Commands Follows all commands and directions without difficulty   Assessment   Limitation Decreased UE strength;Decreased endurance;Decreased Safe judgement during ADL;Decreased high-level ADLs   Prognosis Good   Assessment Pt is a 62y/o male admitted to the hospital(x-marisol from [facility]) 2* abnormal lab values(i e low hemoglobin, high INR)  Pt noted with recurrent R pleural effusion, requiring a s/p thoracentesis(5/23), chest tube placement(5/22)  Pt with PMH A-fib, DM, CHF, cardiomyopathy, neuropathy, splenectomry, and prior thoracentesis  PTA pt states independence with all aspects of his ADLs, transfers, ambulation--w/o device; +falls=1, neg home alone, +  During initial eval, pt demonstrated slight deficits with his functional balance, functional mobility, ADL status, transfer safety, b/l UE strength, and activity tolerance(currently fair=15-20mins)  Pt would benefit from 1-5 OT tx sessions for the above deficits  Goals   Patient Goals "to be able to walk my dog again "   STG Time Frame 3-5   Short Term Goal #1 Pt will demonstrate improved activity tolerance to good(20-30mins) and standing tolerance to 3-5mins to assist with ADLs  Short Term Goal #2 Pt will demonstrate proper walker/transfer safety 100% of the time  Short Term Goal  Pt will demonstrate mod I with their UE and LE bathing/dresssing  LTG Time Frame   (5-10days)   Long Term Goal #1 Pt will demonstrate g/g- balance with all functional activities  Long Term Goal #2 Pt will demonstrate improved b/l UE strength by 1/2 MM grade to assist with ADLs/transfers  Long Term Goal Pt will demonstrate independence with his b/l UE % of the time  Plan   Treatment Interventions ADL retraining;Functional transfer training;UE strengthening/ROM; Endurance training;Patient/family training; Compensatory technique education   Goal Expiration Date 06/05/21   OT Treatment Day 0   OT Frequency 2-3x/wk   Recommendation   OT Discharge Recommendation   (home with family support)   OT - OK to Discharge Yes   AM-PAC Daily Activity Inpatient   Lower Body Dressing 3   Bathing 3   Toileting 3   Upper Body Dressing 3   Grooming 4   Eating 4   Daily Activity Raw Score 20   Daily Activity Standardized Score (Calc for Raw Score >=11) 42 03   Dasha Rutledge, OT

## 2021-05-25 NOTE — ASSESSMENT & PLAN NOTE
· POA, in the setting of long term anticoagulation For paroxysmal AFib   · Patient on Coumadin with INR goal 2-3   · Patient with supratherapeutic INR of 4 07  · Received oral vitamin K at the [facility] emergency department   · INR 1 44 today  · Continue to hold Coumadin in the setting of continuous bloody effusion

## 2021-05-25 NOTE — PROGRESS NOTES
Progress Note - Pulmonary   Mildred Miner 57 y o  male MRN: [id number]  Unit/Bed#: E2 -01 Encounter: [id number]    Assessment & Plan:  · Right-sided pleural effusion  · Acute on chronic diastolic CHF   · MARISABEL on CKD  · Cirrhosis  · A fib on Coumadin  · STEFF on CPAP  · Tobacco abuse    Patient's respiratory status is stable on room air  Patient underwent thora with removal of 2000 cc of fluid with chest tube placement following  There is approx 1800 cc output bloody pleural fluid since which is exudative in nature  Cyto pending  Follow-up repeat CXR today  +/- chest CT  Will possibly begin tPA/dornase instillation today to help break up loculations  If the effusion is refractory, he will need thoracic surgery    Discussed with nursing, Quiana Storey physician, Nephrology, nursing    Subjective:    patient says he is feeling a little better today  No significant shortness of breath  No coughing or chest pain  Mild pain at chest tube site  Complaining of cramping in his fingers  12 point review of systems otherwise negative    Objective:     Vitals: Blood pressure 108/62, pulse (!) 115, temperature (!) 96 6 °F (35 9 °C), temperature source Temporal, resp  rate 18, height 5' 11" (1 803 m), weight 129 kg (283 lb 11 7 oz), SpO2 100 %  ,Body mass index is 39 57 kg/m²  Intake/Output Summary (Last 24 hours) at 5/25/2021 1036  Last data filed at 5/25/2021 5561  Gross per 24 hour   Intake 1080 ml   Output 1630 ml   Net -550 ml       Invasive Devices     Peripheral Intravenous Line            Peripheral IV 05/22/21 Left Wrist 3 days    Peripheral IV 05/22/21 Right Antecubital 3 days          Drain            Chest Tube 1 Right 8 Fr  2 days                Physical Exam:   General appearance: Alert and oriented, in no acute distress  Head: Normocephalic, without obvious abnormality, atraumatic  Eyes: EOMI  No discharge bilaterally  No scleral icterus     Neck: Supple, symmetrical, trachea midline  Lungs: Decreased breath sounds on R  R sided chest tube in place on suction, drainage is bloody  Heart: tachycardia  Abdomen:  No appreciable distension or tenderness  Extremities: + LE edema  Skin: Warm and dry  Neurologic: No acute focal deficits are noted    Labs: I have personally reviewed pertinent lab results  Imaging and other studies: I have personally reviewed pertinent reports     and I have personally reviewed pertinent films in PACS

## 2021-05-25 NOTE — NURSING NOTE
Pt's R chest tube evaluated and found to be blocked  Bloody fluid in drainage system and small amount of blood appeared in stopcock  Cleaned port with alcohol and attempted flush with 10cc flush but could not flush  Transferred NSS to 3cc syringe and was able to clear blockage  Tube now flushes and aspirated freely  Aspirate is sanguineous fluid  Pt reconnected to drainage system and suction applied  Assisted floor RN with [facility] exchange  Collette Lao PA-C notified  Dr Sejal Mejia also aware of outcome  If TPA still needed please inform floor RN

## 2021-05-25 NOTE — QUICK NOTE
Myself and Dr Bhavesh Gentile attempted tPA instillation through patient's chest tube this afternoon however this was unsuccessful secondary to chest tube being significantly clogged  I called and spoke with IR nurse regarding this & was advised to place IR consult for chest tube re-evaluation  We have requested this to be evaluated by IR as soon as possible  Patient's chest tube is closed off by 3 way stopcock at this time, no longer connected to wall suction  Patient is stable

## 2021-05-25 NOTE — ASSESSMENT & PLAN NOTE
Wt Readings from Last 3 Encounters:   05/25/21 129 kg (283 lb 11 7 oz)   05/22/21 (!) 136 kg (300 lb 0 7 oz)   04/22/21 (!) 137 kg (302 lb)     · Patient euvolemic on exam  · Followed by Dr Dirk Salinas  · Was admitted in early March for acute on chronic diastolic CHF, diuresed approximately 60 pounds during that admission  · Lasix 80 milligrams b i d -patient has been unable to receive this given low blood pressure  · Metolazone 5 milligrams twice a week  · Weight has remained stable   · Continue intake and output and daily weights

## 2021-05-25 NOTE — ASSESSMENT & PLAN NOTE
Lab Results   Component Value Date    EGFR 29 05/25/2021    EGFR 28 05/24/2021    EGFR 27 05/23/2021    CREATININE 2 31 (H) 05/25/2021    CREATININE 2 36 (H) 05/24/2021    CREATININE 2 43 (H) 05/23/2021     · Creatinine remains stable at 2 3  · Nephrology following  · Continue Lasix 80 mg b i d   With hold parameters  · Continue holding ACE-inhibitor  · Stable hypotension  · Daily weights with intake and output

## 2021-05-25 NOTE — PHYSICAL THERAPY NOTE
05/25/21 1423   PT Last Visit   PT Visit Date 05/25/21   Note Type   Note Type Treatment   Cancel Reasons   (pt refused)   Luz Marina Price, PTA

## 2021-05-25 NOTE — ASSESSMENT & PLAN NOTE
· Permanent atrial fibrillation   · Managed on Coreg 6 25 milligrams b i d     · Chronic anticoagulation with Coumadin 5 milligrams Sunday, Tuesday, Wednesday, Friday, Saturday and 2 5 milligrams Monday and Thursday  · Anticoagulated with Coumadin at home, currently on hold given anemia and bloody output from chest tube  · Without RVR  · Managed by Dr Zach Evans

## 2021-05-25 NOTE — PLAN OF CARE
Problem: PAIN - ADULT  Goal: Verbalizes/displays adequate comfort level or baseline comfort level  Description: Interventions:  - Encourage patient to monitor pain and request assistance  - Assess pain using appropriate pain scale  - Administer analgesics based on type and severity of pain and evaluate response  - Implement non-pharmacological measures as appropriate and evaluate response  - Consider cultural and social influences on pain and pain management  - Notify physician/advanced practitioner if interventions unsuccessful or patient reports new pain  Outcome: Progressing     Problem: INFECTION - ADULT  Goal: Absence or prevention of progression during hospitalization  Description: INTERVENTIONS:  - Assess and monitor for signs and symptoms of infection  - Monitor lab/diagnostic results  - Monitor all insertion sites, i e  indwelling lines, tubes, and drains  - Monitor endotracheal if appropriate and nasal secretions for changes in amount and color  - [place] appropriate cooling/warming therapies per order  - Administer medications as ordered  - Instruct and encourage patient and family to use good hand hygiene technique  - Identify and instruct in appropriate isolation precautions for identified infection/condition  Outcome: Progressing  Goal: Absence of fever/infection during neutropenic period  Description: INTERVENTIONS:  - Monitor WBC    Outcome: Progressing     Problem: SAFETY ADULT  Goal: Patient will remain free of falls  Description: INTERVENTIONS:  - Assess patient frequently for physical needs  -  Identify cognitive and physical deficits and behaviors that affect risk of falls    -  [place] fall precautions as indicated by assessment   - Educate patient/family on patient safety including physical limitations  - Instruct patient to call for assistance with activity based on assessment  - Modify environment to reduce risk of injury  - Consider OT/PT consult to assist with REPORT CALLED TO PACU strengthening/mobility  Outcome: Progressing  Goal: Maintain or return to baseline ADL function  Description: INTERVENTIONS:  -  Assess patient's ability to carry out ADLs; assess patient's baseline for ADL function and identify physical deficits which impact ability to perform ADLs (bathing, care of mouth/teeth, toileting, grooming, dressing, etc )  - Assess/evaluate cause of self-care deficits   - Assess range of motion  - Assess patient's mobility; develop plan if impaired  - Assess patient's need for assistive devices and provide as appropriate  - Encourage maximum independence but intervene and supervise when necessary  - Involve family in performance of ADLs  - Assess for home care needs following discharge   - Consider OT consult to assist with ADL evaluation and planning for discharge  - Provide patient education as appropriate  Outcome: Progressing  Goal: Maintain or return mobility status to optimal level  Description: INTERVENTIONS:  - Assess patient's baseline mobility status (ambulation, transfers, stairs, etc )    - Identify cognitive and physical deficits and behaviors that affect mobility  - Identify mobility aids required to assist with transfers and/or ambulation (gait belt, sit-to-stand, lift, walker, cane, etc )  - [place] fall precautions as indicated by assessment  - Record patient progress and toleration of activity level on Mobility SBAR; progress patient to next Phase/Stage  - Instruct patient to call for assistance with activity based on assessment  - Consider rehabilitation consult to assist with strengthening/weightbearing, etc   Outcome: Progressing     Problem: DISCHARGE PLANNING  Goal: Discharge to home or other facility with appropriate resources  Description: INTERVENTIONS:  - Identify barriers to discharge w/patient and caregiver  - Arrange for needed discharge resources and transportation as appropriate  - Identify discharge learning needs (meds, wound care, etc )  - Arrange for interpretive services to assist at discharge as needed  - Refer to Case Management Department for coordinating discharge planning if the patient needs post-hospital services based on physician/advanced practitioner order or complex needs related to functional status, cognitive ability, or social support system  Outcome: Progressing     Problem: Knowledge Deficit  Goal: Patient/family/caregiver demonstrates understanding of disease process, treatment plan, medications, and discharge instructions  Description: Complete learning assessment and assess knowledge base  Interventions:  - Provide teaching at level of understanding  - Provide teaching via preferred learning methods  Outcome: Progressing     Problem: COPING  Goal: Pt/Family able to verbalize concerns and demonstrate effective coping strategies  Description: INTERVENTIONS:  - Assist patient/family to identify coping skills, available support systems and cultural and spiritual values  - Provide emotional support, including active listening and acknowledgement of concerns of patient and caregivers  - Reduce environmental stimuli, as able  - Provide patient education  - Assess for spiritual pain/suffering and initiate spiritual care, including notification of Pastoral Care or daniel based community as needed  - Assess effectiveness of coping strategies  Outcome: Progressing  Goal: Will report anxiety at manageable levels  Description: INTERVENTIONS:  - Administer medication as ordered  - Teach and encourage coping skills  - Provide emotional support  - Assess patient/family for anxiety and ability to cope  Outcome: Progressing     Problem: Potential for Falls  Goal: Patient will remain free of falls  Description: INTERVENTIONS:  - Assess patient frequently for physical needs  -  Identify cognitive and physical deficits and behaviors that affect risk of falls    -  [place] fall precautions as indicated by assessment   - Educate patient/family on patient safety including physical limitations  - Instruct patient to call for assistance with activity based on assessment  - Modify environment to reduce risk of injury  - Consider OT/PT consult to assist with strengthening/mobility  Outcome: Progressing

## 2021-05-25 NOTE — PROGRESS NOTES
NEPHROLOGY PROGRESS NOTE   Sujata Valladares 57 y o  male MRN: [id number]  Unit/Bed#: E2 -01 Encounter: [id number]      ASSESSMENT & PLAN:  1  MARISABEL on top of CKD stage 3, baseline Cr around 1 6, follows with Dr Gigi Fuentes    MARISABEL 2/2 CRS, prior ACE-I, hypotension, anemia that lead to ATN  Cr on admission 2 8, down to 2 31 today  Keep holding ACE-I, avoid relative hypotension  continue p o  diuretics with holding parameters for low blood pressure  Follow daily weight and I/O  Follow daily labs    2  Volume overload with right pleural effusion, back on home diuretics  S/p thoracentesis and now with CT in place  Pulmonology in board    3  Hemodynamics, chronic hypotension, blood pressure is stable in the lower side, asymptomatic    4  Cirrhosis suspected ETOH vs fatty liver vs cardiac cirrhosis    5  Anemia, Hemoglobin dropped to 6 8 today, agree with blood transfusion, monitor H&H          my plan and recommendations were discussed with Internal Medicine attending      SUBJECTIVE:   patient seen and examined, complaining of some numbness and muscle spasms in arms  Denies any chest pain, no significant shortness of breath, some discomfort over chest tube side  Denies any nausea, no vomiting          OBJECTIVE:  Current Weight: Weight - Scale: 129 kg (283 lb 11 7 oz)  Vitals:    05/25/21 0812   BP: 108/62   Pulse: (!) 115   Resp: 18   Temp: (!) 96 6 °F (35 9 °C)   SpO2: 100%       Intake/Output Summary (Last 24 hours) at 5/25/2021 1032  Last data filed at 5/25/2021 4948  Gross per 24 hour   Intake 1080 ml   Output 1630 ml   Net -550 ml     General: conscious, cooperative, in not acute distress  Eyes: conjunctivae pink, anicteric sclerae  ENT: lips and mucous membranes moist  Neck: supple, no JVD  Chest: clear breath sounds bilateral, no crackles, ronchus or wheezing, Right-sided chest tube in place  CVS:  Mildly tachycardic  Abdomen: soft, non-tender, non-distended, normoactive bowel sounds  Extremities: 2+ edema of both legs  Skin: no rash  Neuro: awake, alert, oriented          Medications:    Current Facility-Administered Medications:     acetaminophen (TYLENOL) tablet 650 mg, 650 mg, Oral, Q6H PRN, Tish Dawson PA-C    bisacodyl (DULCOLAX) EC tablet 5 mg, 5 mg, Oral, Daily PRN, Tish Dawson PA-C, 5 mg at 05/24/21 1722    calcium carbonate (TUMS) chewable tablet 1,000 mg, 1,000 mg, Oral, Daily PRN, Tish Dawson PA-C    carvedilol (COREG) tablet 6 25 mg, 6 25 mg, Oral, BID With Meals, Tiarra Almeida DO, Stopped at 05/25/21 0856    ergocalciferol (VITAMIN D2) capsule 50,000 Units, 50,000 Units, Oral, Weekly, Tish Dawson PA-C    furosemide (LASIX) injection 40 mg, 40 mg, Intravenous, Once, Bennie Roberts MD    furosemide (LASIX) tablet 80 mg, 80 mg, Oral, BID (diuretic), Kelley Sarkar PA-C, Stopped at 05/25/21 0856    HYDROmorphone HCl (DILAUDID) injection 0 2 mg, 0 2 mg, Intravenous, Q6H PRN, Izzy Perrin MD, 0 2 mg at 05/24/21 2308    nicotine (NICODERM CQ) 14 mg/24hr TD 24 hr patch 1 patch, 1 patch, Transdermal, Daily, Tish Dawson PA-C    ondansetron [facility]) injection 4 mg, 4 mg, Intravenous, Q6H PRN, Tish Dawson PA-C    oxyCODONE-acetaminophen (PERCOCET) 5-325 mg per tablet 1 tablet, 1 tablet, Oral, Q6H PRN, Izzy Perrin MD, 1 tablet at 05/25/21 0901    polyethylene glycol (MIRALAX) packet 17 g, 17 g, Oral, Daily PRN, Tish Dawson PA-C    Invasive Devices:        Lab Results:   Results from last 7 days   Lab Units 05/25/21  0520 05/24/21  0728 05/23/21  1231 05/23/21  0620  05/22/21  0850 05/21/21  0931   WBC Thousand/uL 5 01 4 39  --  4 30*  --  3 74* 4 18*   HEMOGLOBIN g/dL 6 8* 7 7* 7 4* 7 1*   < > 7 6* 7 4*   HEMATOCRIT % 21 7* 25 0* 22 8* 22 6*   < > 23 4* 23 9*   PLATELETS Thousands/uL 166 173  --  149  --  162 170   SODIUM mmol/L 135* 137  --  135*  --  129* 131*   POTASSIUM mmol/L 4 9 4 3  --  4 4  --  4 0 4 2   CHLORIDE mmol/L 97* 97*  --  96*  --  94* 96*   CO2 mmol/L 30 30  -- 29  --  30 30   BUN mg/dL 85* 80*  --  82*  --  88* 93*   CREATININE mg/dL 2 31* 2 36*  --  2 43*  --  2 72* 2 80*   CALCIUM mg/dL 9 2 9 4  --  8 8  --  8 3 8 6   MAGNESIUM mg/dL  --   --   --   --   --   --  2 3   PHOSPHORUS mg/dL  --   --   --   --   --   --  5 2*   ALK PHOS U/L  --   --   --   --   --  133* 130*   ALT U/L  --   --   --   --   --  17 16   AST U/L  --   --   --   --   --  20 20    < > = values in this interval not displayed  Previous work up:  See previous notes      Portions of the record may have been created with voice recognition software  Occasional wrong word or "sound a like" substitutions may have occurred due to the inherent limitations of voice recognition software  Read the chart carefully and recognize, using context, where substitutions have occurred  If you have any questions, please contact the dictating provider

## 2021-05-25 NOTE — PROGRESS NOTES
[facility]  Progress Note - Yolanda Churchill 1960, 57 y o  male MRN: [id number]  Unit/Bed#: E2 -01 Encounter: [id number]  Primary Care Provider: Concepcion Alva DO   Date and time admitted to hospital: 5/22/2021  2:59 PM    * Pleural effusion on right  Assessment & Plan  · Patient is she presented to the hospital with shortness of breath, found to have recurrent right-sided pleural effusion  · Transferred to [facility] for Interventional Radiology  · Patient underwent thoracentesis on 05/22, chest tube in place with continued output of bloody fluid  · Fluid-exudative but negative for malignancy  · Infusion of tPA/dornase into chest tube today  · Repeat chest x-ray with continued effusion  · Continue p r n  Analgesics  · May require thoracic surgery evaluation if refractory to tPA    Acute renal failure superimposed on stage 3b chronic kidney disease [facility])  Assessment & Plan  Lab Results   Component Value Date    EGFR 29 05/25/2021    EGFR 28 05/24/2021    EGFR 27 05/23/2021    CREATININE 2 31 (H) 05/25/2021    CREATININE 2 36 (H) 05/24/2021    CREATININE 2 43 (H) 05/23/2021     · Creatinine remains stable at 2 3  · Nephrology following  · Continue Lasix 80 mg b i d   With hold parameters  · Continue holding ACE-inhibitor  · Stable hypotension  · Daily weights with intake and output    Chronic anemia  Assessment & Plan  · With baseline hemoglobin approximately 9 5   · Hemoglobin 6 8 today  · Will transfuse 1 unit PRBCs and recheck hemoglobin this evening  · Continue to transfuse as needed below 7    Tobacco abuse  Assessment & Plan  · Nicotine patch ordered    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 ascites ([address] )  Assessment & Plan  · Last seen by outpatient GI on 04/08   · [facility] [address]  screening is up-to-date   · Last EGD did not show any esophageal varices   · Likely contributing to his supratherapeutic INR and anemia  · Suspect fatty liver vs cardiac in nature    STEFF (obstructive sleep apnea)  Assessment & Plan  · CPAP while sleeping    Supratherapeutic INR  Assessment & Plan  · POA, in the setting of long term anticoagulation For paroxysmal AFib   · Patient on Coumadin with INR goal 2-3   · Patient with supratherapeutic INR of 4 07  · Received oral vitamin K at the [facility] emergency department   · INR 1 44 today  · Continue to hold Coumadin in the setting of continuous bloody effusion    Type 2 diabetes mellitus with diabetic chronic kidney disease ([address] )  Assessment & Plan  Lab Results   Component Value Date    HGBA1C 5 5 05/21/2021       No results for input(s): POCGLU in the last 72 hours  Blood Sugar Average: Last 72 hrs:  ·  maintained without medications      Chronic diastolic (congestive) heart failure (HCC)  Assessment & Plan  Wt Readings from Last 3 Encounters:   05/25/21 129 kg (283 lb 11 7 oz)   05/22/21 (!) 136 kg (300 lb 0 7 oz)   04/22/21 (!) 137 kg (302 lb)     · Patient euvolemic on exam  · Followed by Dr Marcus Flores  · Was admitted in early March for acute on chronic diastolic CHF, diuresed approximately 60 pounds during that admission  · Lasix 80 milligrams b i d -patient has been unable to receive this given low blood pressure  · Metolazone 5 milligrams twice a week  · Weight has remained stable   · Continue intake and output and daily weights          Atrial fibrillation (HCC)  Assessment & Plan  · Permanent atrial fibrillation   · Managed on Coreg 6 25 milligrams b i d     · Chronic anticoagulation with Coumadin 5 milligrams Sunday, Tuesday, Wednesday, Friday, Saturday and 2 5 milligrams Monday and Thursday  · Anticoagulated with Coumadin at home, currently on hold given anemia and bloody output from chest tube  · Without RVR  · Managed by Dr Marcus Flores    VTE Pharmacologic Prophylaxis:   Pharmacologic: Pharmacologic VTE Prophylaxis contraindicated due to Anemia  Mechanical VTE Prophylaxis in Place: Yes    Patient Centered Rounds: I have performed bedside rounds with nursing staff today     Discussions with Specialists or Other Care Team Provider:  Nursing, case management    Education and Discussions with Family / Patient:  Patient, patient declined family update    Time Spent for Care: 30 minutes  More than 50% of total time spent on counseling and coordination of care as described above  Current Length of Stay: 3 day(s)    Current Patient Status: Inpatient   Certification Statement: The patient will continue to require additional inpatient hospital stay due to Pleural effusion requiring chest tube management, anemia requiring blood transfusion continued monitoring    Discharge Plan:  Not medically stable, requiring improvement in hemoglobin and pleural effusion    Code Status: Level 1 - Full Code      Subjective:   Patient reports he is doing okay today  Still having some pain which is improved with p r n  Analgesics  Denies any shortness of breath but notes he has not done much activity  Objective:     Vitals:   Temp (24hrs), Av 7 °F (35 9 °C), Min:95 2 °F (35 1 °C), Max:97 5 °F (36 4 °C)    Temp:  [95 2 °F (35 1 °C)-97 5 °F (36 4 °C)] 95 2 °F (35 1 °C)  HR:  [101-115] 115  Resp:  [18-20] 18  BP: ()/(50-85) 96/60  SpO2:  [97 %-100 %] 100 %  Body mass index is 39 57 kg/m²  Input and Output Summary (last 24 hours): Intake/Output Summary (Last 24 hours) at 2021 1324  Last data filed at 2021 9919  Gross per 24 hour   Intake 1080 ml   Output 1330 ml   Net -250 ml       Physical Exam:     Physical Exam  Vitals signs reviewed  Constitutional:       General: He is not in acute distress  HENT:      Head: Normocephalic and atraumatic  Eyes:      General: No scleral icterus  Conjunctiva/sclera: Conjunctivae normal    Neck:      Musculoskeletal: Neck supple  Cardiovascular:      Rate and Rhythm: Tachycardia present  Rhythm irregular  Heart sounds: No murmur  Pulmonary:      Effort: Pulmonary effort is normal  No respiratory distress  Breath sounds: Normal breath sounds  Comments: Breath sounds decreased at bases, chest tube in place  Abdominal:      General: Bowel sounds are normal  There is no distension  Palpations: Abdomen is soft  Tenderness: There is no abdominal tenderness  Musculoskeletal:      Right lower leg: No edema  Left lower leg: No edema  Skin:     General: Skin is warm and dry  Neurological:      Mental Status: He is alert and oriented to person, place, and time  Psychiatric:         Mood and Affect: Mood normal          Behavior: Behavior normal        Additional Data:     Labs:    Results from last 7 days   Lab Units 05/25/21  0520 05/23/21  0620   WBC Thousand/uL 5 01   < > 4 30*   HEMOGLOBIN g/dL 6 8*   < > 7 1*   HEMATOCRIT % 21 7*   < > 22 6*   PLATELETS Thousands/uL 166   < > 149   NEUTROS PCT %  --   --  73   LYMPHS PCT %  --   --  11*   MONOS PCT %  --   --  11   EOS PCT %  --   --  3    < > = values in this interval not displayed  Results from last 7 days   Lab Units 05/25/21  0520 05/22/21  0850   SODIUM mmol/L 135*   < > 129*   POTASSIUM mmol/L 4 9   < > 4 0   CHLORIDE mmol/L 97*   < > 94*   CO2 mmol/L 30   < > 30   BUN mg/dL 85*   < > 88*   CREATININE mg/dL 2 31*   < > 2 72*   ANION GAP mmol/L 8   < > 5   CALCIUM mg/dL 9 2   < > 8 3   ALBUMIN g/dL  --   --  3 0*   TOTAL BILIRUBIN mg/dL  --   --  0 46   ALK PHOS U/L  --   --  133*   ALT U/L  --   --  17   AST U/L  --   --  20   GLUCOSE RANDOM mg/dL 100   < > 116    < > = values in this interval not displayed  Results from last 7 days   Lab Units 05/24/21  1159   INR  1 44*         Results from last 7 days   Lab Units 05/21/21  0931   HEMOGLOBIN A1C % 5 5     Results from last 7 days   Lab Units 05/22/21  0850   LACTIC ACID mmol/L 1 2           * I Have Reviewed All Lab Data Listed Above  * Additional Pertinent Lab Tests Reviewed:  [facility] Admission Reviewed    Imaging:    Imaging Reports Reviewed Today Include:  Chest x-ray  Imaging Personally Reviewed by Myself Includes:  Chest x-ray    Recent Cultures (last 7 days):     Results from last 7 days   Lab Units 05/22/21  1738   GRAM STAIN RESULT  Rare Polys  No organisms seen   BODY FLUID CULTURE, STERILE  No growth       Last 24 Hours Medication List:   Current Facility-Administered Medications   Medication Dose Route Frequency Provider Last Rate    acetaminophen  650 mg Oral Q6H PRN Lion Edwards PA-C      alteplase (TPA) 10 mg in SWFI 30 mL chest tube/drain tube instillation  10 mg Chest Tube Once Kee Stokes PA-C      And    dornase calderon (PULMOZYME) 5 mg in 30 mL SWFI chest tube/drain tube instillation  5 mg Chest Tube Once Kee Stokes PA-C      bisacodyl  5 mg Oral Daily PRN Lion Edwards PA-C      calcium carbonate  1,000 mg Oral Daily PRN Lion Edwards PA-C      carvedilol  6 25 mg Oral BID With Meals Ca Solano DO      ergocalciferol  50,000 Units Oral Weekly Lion Edwards PA-C      furosemide  40 mg Intravenous Once Pillo Joshi MD      furosemide  80 mg Oral BID (diuretic) Asad Orr PA-C      HYDROmorphone  0 2 mg Intravenous Q6H PRN Ro Rivera MD      nicotine  1 patch Transdermal Daily Lion Edwards PA-C      ondansetron  4 mg Intravenous Q6H PRN Lion Edwards PA-C      oxyCODONE-acetaminophen  1 tablet Oral Q6H PRN Ro Rivera MD      polyethylene glycol  17 g Oral Daily PRN Lion Edwards PA-C          Today, Patient Was Seen By: Danii Parker PA-C    ** Please Note: Dictation voice to text software may have been used in the creation of this document   **

## 2021-05-25 NOTE — ASSESSMENT & PLAN NOTE
· Patient is she presented to the hospital with shortness of breath, found to have recurrent right-sided pleural effusion  · Transferred to [facility] for Interventional Radiology  · Patient underwent thoracentesis on 05/22, chest tube in place with continued output of bloody fluid  · Fluid-exudative but negative for malignancy  · Infusion of tPA/dornase into chest tube today  · Repeat chest x-ray with continued effusion  · Continue p r n   Analgesics  · May require thoracic surgery evaluation if refractory to tPA

## 2021-05-25 NOTE — ASSESSMENT & PLAN NOTE
· With baseline hemoglobin approximately 9 5   · Hemoglobin 6 8 today  · Will transfuse 1 unit PRBCs and recheck hemoglobin this evening  · Continue to transfuse as needed below 7

## 2021-05-25 NOTE — PHYSICAL THERAPY NOTE
Physical Therapy Cancellation Note  Attempted to see pt for PT session at 14:23 &he refused due to blood transfusion running, chest tube & hand cramping  Will attempt again tomorrow   RN aware of refusal  Lili Rivera, PTA

## 2021-05-25 NOTE — ASSESSMENT & PLAN NOTE
· Last seen by outpatient GI on 04/08   · [facility]  screening is up-to-date   · Last EGD did not show any esophageal varices   · Likely contributing to his supratherapeutic INR and anemia  · Suspect fatty liver vs cardiac in nature Detail Level: Detailed Quality 226: Preventive Care And Screening: Tobacco Use: Screening And Cessation Intervention: Patient screened for tobacco use and is an ex/non-smoker Quality 130: Documentation Of Current Medications In The Medical Record: Current Medications Documented Quality 431: Preventive Care And Screening: Unhealthy Alcohol Use - Screening: Patient screened for unhealthy alcohol use using a single question and scores less than 2 times per year

## 2021-05-26 PROBLEM — R79.1 SUPRATHERAPEUTIC INR: Status: RESOLVED | Noted: 2018-10-24 | Resolved: 2021-01-01

## 2021-05-26 NOTE — DISCHARGE SUMMARY
[facility]  Discharge- Darlys Nickolas 1960, 57 y o  male MRN: [id number]  Unit/Bed#: E2 -01 Encounter: [id number]  Primary Care Provider: Baron Barclay DO   Date and time admitted to hospital: 5/22/2021  2:59 PM    * Pleural effusion on right  Assessment & Plan  · Patient presented to [facility] with shortness of breath, found to have recurrent right-sided pleural effusion  · Transferred to [facility] for Interventional Radiology  · Thoracentesis on 05/22, chest tube in place with continued output of bloody fluid  · Fluid-exudative but negative for malignancy  · Infusion of tPA/dornase into chest tube attempted on 05/25, tube was clogged, Interventional Radiology consulted and subsequently unclogged chest tube  · Patient did not receive tPA due to clogged tube  · Repeat chest x-ray on 05/24 with continued effusion  · CT scan performed on 05/26 revealed pleural effusion improvement with new hyperdensities consistent with lung hematomas  · Pulmonology discussed this case with Dr Seferino Spurling of Cardiothoracic surgery at [facility] who agreed that the patient should be transferred to [facility] for thoracic surgery evaluation    Acute renal failure superimposed on stage 3b chronic kidney disease [facility])  Assessment & Plan  Lab Results   Component Value Date    EGFR 33 05/26/2021    EGFR 29 05/25/2021    EGFR 28 05/24/2021    CREATININE 2 08 (H) 05/26/2021    CREATININE 2 31 (H) 05/25/2021    CREATININE 2 36 (H) 05/24/2021     · Creatinine remains stable at 2 3  · Nephrology following  · Continue Lasix 80 mg b i d   With hold parameters  · Continue holding ACE-inhibitor  · Stable hypotension  · Daily weights with intake and output    Chronic anemia  Assessment & Plan  · With baseline hemoglobin approximately 9 5   · Hemoglobin 6 8 on 5 25  · Status post 1 unit PRBCs transfusion  · Responded appropriately, repeat hemoglobin 7 1 most recent 7 6  · Patient hypotensive and tachycardic this a m- suspect likely secondary to pulmonary hematomas  · Continue to transfuse as needed below 7    Tobacco abuse  Assessment & Plan  · Nicotine patch ordered    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 ascites (HCC)  Assessment & Plan  · Last seen by outpatient GI on 04/08   · [place] [address]  screening is up-to-date   · Last EGD did not show any esophageal varices   · Likely contributing to his supratherapeutic INR and anemia  · Suspect fatty liver vs cardiac in nature    STEFF (obstructive sleep apnea)  Assessment & Plan  · CPAP while sleeping    Type 2 diabetes mellitus with diabetic chronic kidney disease ([place] [address] )  Assessment & Plan  Lab Results   Component Value Date    HGBA1C 5 5 05/21/2021       No results for input(s): POCGLU in the last 72 hours  Blood Sugar Average: Last 72 hrs:  ·  maintained without medications      Chronic diastolic (congestive) heart failure (HCC)  Assessment & Plan  Wt Readings from Last 3 Encounters:   05/26/21 131 kg (288 lb 12 8 oz)   05/22/21 (!) 136 kg (300 lb 0 7 oz)   04/22/21 (!) 137 kg (302 lb)     · Patient euvolemic on exam  · Followed by Dr Hannah Tian  · Was admitted in early March for acute on chronic diastolic CHF, diuresed approximately 60 pounds during that admission  · Lasix 80 milligrams b i d -patient has been unable to receive this given low blood pressure  · Metolazone 5 milligrams twice a week  · Weight has remained stable   · Continue intake and output and daily weights          Atrial fibrillation (HCC)  Assessment & Plan  · Permanent atrial fibrillation   · Managed on Coreg 6 25 milligrams b i d     · Chronic anticoagulation with Coumadin 5 milligrams Sunday, Tuesday, Wednesday, Friday, Saturday and 2 5 milligrams Monday and Thursday  · Anticoagulated with Coumadin at home, currently on hold given anemia and bloody output from chest tube  · Managed by Dr Hannah Tian    Hyponatremia-resolved as of 5/24/2021  Assessment & Plan  · Sodium 129 on admission-->improved 137  · resolved  · Suspect dilutional   · Fluid restriction  · Bmp in am    Acute respiratory failure with hypoxia (HCC)-resolved as of 5/23/2021  Assessment & Plan  · POA, Currently requiring 2 liters nasal cannula oxygen in the setting of r pleural effusion  · Resolved, now on RA  · Denies use of oxygen at home  · Titrate to maintain oxygen saturations greater than 88 percent   · See assessment plan under pleural effusion    Supratherapeutic INR-resolved as of 5/26/2021  Assessment & Plan  · POA, in the setting of long term anticoagulation For paroxysmal AFib   · Resolved  · Patient on Coumadin with INR goal 2-3   · Patient with supratherapeutic INR of 4 07  · Received oral vitamin K at the [facility] emergency department   · INR 1 44 on 05/24  · Continue to hold Coumadin in the setting of continuous bloody effusion      Discharging Physician / Practitioner: Renu De Souza PA-C  PCP: Loly Uriostegui DO  Admission Date:   Admission Orders (From admission, onward)     Ordered        05/22/21 1600  Inpatient Admission  Once                   Discharge Date: 05/26/21    Resolved Problems  Date Reviewed: 5/26/2021          Resolved    Supratherapeutic INR 5/26/2021     Resolved by  Renu De Souza PA-C    Acute respiratory failure with hypoxia ([address] ) 5/23/2021     Resolved by  Renu De Souza PA-C    Hyponatremia 5/24/2021     Resolved by  Renu De Souza, [facility] Stay:  · Nephrology, pulmonology    Procedures Performed:   · IR thoracentesis with chest tube placement    Significant Findings / Test Results:   · CXR 5/22-large right pleural effusion and right base atelectasis  · CXR 5/22 s/p thoracentesis-decreased right effusion after pigtail insertion, still moderate with right base atelectasis  · CXR 5/24-persistent right pigtail with no change in moderate right effusion and right base atelectasis  · CXR 5/25-right basilar chest tube unchanged, stable right basilar effusion and obesity  · CT chest without contrast 5/26-improvement in the probe pleural effusion with new hyperdensities consistent with hematomas with pigtail catheter in place  Incidental Findings:   · Splenic solid mass with calcified wall arising from the spleen exophytically    Test Results Pending at Discharge (will require follow up): · None     Outpatient Tests Requested:  · None    Complications:  None    Reason for Admission:  Right pleural effusion    Hospital Course:     Patrizia Gillespie is a 57 y o  male patient with past medical history of hypertension, combined systolic and diastolic CHF, atrial fibrillation anticoagulated on Coumadin, cirrhosis, CKD stage 3, tobacco abuse, obesity who originally presented to the [facility] acute hospital on 5/22/2021 due to abnormal labs and worsening shortness of breath  He was transferred to [facility] on 05/22 for interventional radiology thoracentesis  While admitted, the patient was noted to have a supratherapeutic INR of 4 07 on arrival   Received vitamin K and subsequently underwent interventional radiology thoracentesis with chest tube placement with 2 L of bloody fluid withdrawn on 05/22  We have continued to hold Coumadin throughout his stay  Since being admitted, the patient has underwent evaluation by pulmonology who recommended tPA/DNase placement  This was attempted on 05/25 however was unable to be completed secondary to clogged chest tube  Additionally, on 05/25 the patient's hemoglobin was noted to be 6 8 and he underwent transfusion of 1 unit PRBCs with appropriate response in hemoglobin  The patient was noted to be hypotensive and tachycardic on the morning of 5/26, underwent CT scan which revealed improving pleural effusion with new hyperdensities consistent with hematomas    Pulmonology discussed this case with Dr Padmini Antonio of thoracic surgery at [facility] who agreed that the patient should be transferred to [facility] for thoracic surgery evaluation    Please see above list of diagnoses and related plan for additional information  Condition at Discharge: stable     Discharge Day Visit / Exam:     * Please refer to separate progress note for these details *    Discussion with Family:  Discussed plan of care with patient, stated that he would update his wife accordingly  Is in agreement to the transfer to [facility]  Discharge instructions/Information to patient and family:   See after visit summary for information provided to patient and family  Provisions for Follow-Up Care:  See after visit summary for information related to follow-up care and any pertinent home health orders  Disposition:     [address] [facility]  [address] Transfer to [address] [place] [address] to [facility] SNF:   · Not Applicable to this Patient - Not Applicable to this Patient    Planned Readmission:  [facility]     Discharge Statement:  I spent 39 minutes discharging the patient  This time was spent on the day of discharge  I had direct contact with the patient on the day of discharge  Greater than 50% of the total time was spent examining patient, answering all patient questions, arranging and discussing plan of care with patient as well as directly providing post-discharge instructions  Additional time then spent on discharge activities  Discharge Medications:  See after visit summary for reconciled discharge medications provided to patient and family        ** Please Note: This note has been constructed using a voice recognition system **

## 2021-05-26 NOTE — PLAN OF CARE
Problem: PHYSICAL THERAPY ADULT  Goal: Performs mobility at highest level of function for planned discharge setting  See evaluation for individualized goals  Description: Treatment/Interventions: Functional transfer training, LE strengthening/ROM, Elevations, Therapeutic exercise, Endurance training, Patient/family training, Equipment eval/education, Bed mobility, Gait training, Compensatory technique education, Spoke to nursing, Continued evaluation, OT          See flowsheet documentation for full assessment, interventions and recommendations  Outcome: Progressing  Note: Prognosis: Good  Problem List: Decreased strength, Decreased endurance, Impaired balance, Decreased mobility, Obesity, Decreased skin integrity, Pain  Assessment: Pt seen for PT treatment session this date with interventions consisting of gait training w/ emphasis on improving pt's ability to ambulate level surfaces x 120 feet with close S provided by therapist with no AD and stair training with 1 rail step over step with S x 1 & vcs for safety 10 stairs with pt managing chest tube while ambulating & stair climbing and Therapeutic exercise consisting of: AROM 20 reps B LE in sitting position  Pt agreeable to PT treatment session upon arrival, pt found seated OOB in chair, in no apparent distress  In comparison to previous session, pt with improvements in amb distance, stair climbing & ex tolerance while managing his chest tube  Post session: pt returned back to recliner, all needs in reach and RN notified of session findings/recommendations  Continue to recommend no rehabilitation needs at time of d/c in order to maximize pt's functional independence and safety w/ mobility  Pt continues to be functioning below baseline level, and remains limited 2* factors listed above and including decreased strength, endurance & safe functional mobility   PT will continue to see pt during current hospitalization in order to address the deficits listed above and provide interventions consistent w/ POC in effort to achieve STGs  Barriers to Discharge: Inaccessible home environment  Barriers to Discharge Comments: pt able to safely ascend & descend 10 stairs with 1 rail step over step     PT Discharge Recommendation: No rehabilitation needs          See flowsheet documentation for full assessment

## 2021-05-26 NOTE — ASSESSMENT & PLAN NOTE
· Patient is she presented to the hospital with shortness of breath, found to have recurrent right-sided pleural effusion  · [facility]  · Patient underwent thoracentesis on 05/22, chest tube in place with continued output of bloody fluid  · Fluid-exudative but negative for malignancy  · Infusion of tPA/dornase into chest tube attempted on 05/25, tube was clogged, Interventional Radiology consulted and subsequently unclogged chest tube  · Patient did not receive tPA due to clogged tube- will undergo tPA placement today  · Pulmonology following, appreciate recommendations  · Repeat chest x-ray on 05/24 with continued effusion  · Continue p r n   Analgesics  · May require thoracic surgery evaluation if refractory to tPA

## 2021-05-26 NOTE — ASSESSMENT & PLAN NOTE
· With baseline hemoglobin approximately 9 5   · Hemoglobin 6 8 today  · Status post 1 unit PRBCs transfusion  · Responded appropriately, repeat hemoglobin 7 1 most recent 7 6  · Patient hypotensive and tachycardic this a m , will recheck H/H  · Continue to transfuse as needed below 7

## 2021-05-26 NOTE — ASSESSMENT & PLAN NOTE
Wt Readings from Last 3 Encounters:   05/26/21 131 kg (288 lb 12 8 oz)   05/22/21 (!) 136 kg (300 lb 0 7 oz)   04/22/21 (!) 137 kg (302 lb)     · Patient euvolemic on exam  · Followed by Dr Judith Oscar OP  · Lasix 80 milligrams b i d -patient has been unable to receive this given low blood pressure  · Metolazone 5 milligrams twice a week  · Weight has remained stable   · Continue intake and output and daily weights

## 2021-05-26 NOTE — PROGRESS NOTES
[address] [facility]  Progress Note - Solomon Buck 1960, 57 y o  male MRN: [id number]  Unit/Bed#: E2 -01 Encounter: [id number]  Primary Care Provider: Vik Torres DO   Date and time admitted to hospital: 5/22/2021  2:59 PM    * Pleural effusion on right  Assessment & Plan  · Patient is she presented to the hospital with shortness of breath, found to have recurrent right-sided pleural effusion  · Transferred to [facility] for Interventional Radiology  · Patient underwent thoracentesis on 05/22, chest tube in place with continued output of bloody fluid  · Fluid-exudative but negative for malignancy  · Infusion of tPA/dornase into chest tube attempted on 05/25, tube was clogged, Interventional Radiology consulted and subsequently unclogged chest tube  · Patient did not receive tPA due to clogged tube- will undergo tPA placement today  · Pulmonology following, appreciate recommendations  · Repeat chest x-ray on 05/24 with continued effusion  · Continue p r n   Analgesics  · May require thoracic surgery evaluation if refractory to tPA    Chronic anemia  Assessment & Plan  · With baseline hemoglobin approximately 9 5   · Hemoglobin 6 8 today  · Status post 1 unit PRBCs transfusion  · Responded appropriately, repeat hemoglobin 7 1 most recent 7 6  · Patient hypotensive and tachycardic this a m , will recheck H/H  · Continue to transfuse as needed below 7    Tobacco abuse  Assessment & Plan  · Nicotine patch ordered    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 ascites ([facility] [address] )  Assessment & Plan  · Last seen by outpatient GI on 04/08   · [facility] [address]  screening is up-to-date   · Last EGD did not show any esophageal varices   · Likely contributing to his supratherapeutic INR and anemia  · Suspect fatty liver vs cardiac in nature    STEFF (obstructive sleep apnea)  Assessment & Plan  · CPAP while sleeping    Type 2 diabetes mellitus with diabetic chronic kidney disease [facility])  Assessment & Plan  Lab Results   Component Value Date    HGBA1C 5 5 05/21/2021       No results for input(s): POCGLU in the last 72 hours  Blood Sugar Average: Last 72 hrs:  ·  maintained without medications      Chronic diastolic (congestive) heart failure (HCC)  Assessment & Plan  Wt Readings from Last 3 Encounters:   05/26/21 131 kg (288 lb 12 8 oz)   05/22/21 (!) 136 kg (300 lb 0 7 oz)   04/22/21 (!) 137 kg (302 lb)     · Patient euvolemic on exam  · Followed by Dr Mary Ellen Oliveira  · Was admitted in early March for acute on chronic diastolic CHF, diuresed approximately 60 pounds during that admission  · Lasix 80 milligrams b i d -patient has been unable to receive this given low blood pressure  · Metolazone 5 milligrams twice a week  · Weight has remained stable   · Continue intake and output and daily weights          Atrial fibrillation (HCC)  Assessment & Plan  · Permanent atrial fibrillation   · Managed on Coreg 6 25 milligrams b i d  · Chronic anticoagulation with Coumadin 5 milligrams Sunday, Tuesday, Wednesday, Friday, Saturday and 2 5 milligrams Monday and Thursday  · Anticoagulated with Coumadin at home, currently on hold given anemia and bloody output from chest tube  · Without RVR  · Managed by Dr Brian Dickens INR-resolved as of 5/26/2021  Assessment & Plan  · POA, in the setting of long term anticoagulation For paroxysmal AFib   · Resolved  · Patient on Coumadin with INR goal 2-3   · Patient with supratherapeutic INR of 4 07  · Received oral vitamin K at the [facility] emergency department   · INR 1 44 on 05/24  · Continue to hold Coumadin in the setting of continuous bloody effusion      VTE Pharmacologic Prophylaxis:   Pharmacologic: anemia  Mechanical VTE Prophylaxis in Place: Yes    Patient Centered Rounds: I have performed bedside rounds with nursing staff today      Discussions with Specialists or Other Care Team Provider: pulm    Education and Discussions with Family / Patient:  Discussed plan of care with patient, patient stated that he would update his wife accordingly  Time Spent for Care: 20 minutes  More than 50% of total time spent on counseling and coordination of care as described above  Current Length of Stay: 4 day(s)    Current Patient Status: Inpatient   Certification Statement: The patient will continue to require additional inpatient hospital stay due to TPA administration and continue chest tube  Discharge Plan: To home pending pulmonology recommendations    Code Status: Level 1 - Full Code      Subjective:   Patient states that he is feeling okay today  States that when he was up walking around with his mask on, he was having some shortness of breath and associated dizziness  States that this resolved when he sat down and took his mask off  He denies any lightheadedness or dizziness at rest   Denies any increase in shortness of breath, chest pain, nausea, vomiting, diarrhea, constipation  Denies any increase in leg swelling  Objective:     Vitals:   Temp (24hrs), Av 8 °F (36 °C), Min:95 8 °F (35 4 °C), Max:98 2 °F (36 8 °C)    Temp:  [95 8 °F (35 4 °C)-98 2 °F (36 8 °C)] 96 1 °F (35 6 °C)  HR:  [] 113  Resp:  [18-20] 18  BP: (84-98)/(50-63) 90/58  SpO2:  [94 %-100 %] 97 %  Body mass index is 40 28 kg/m²  Input and Output Summary (last 24 hours): Intake/Output Summary (Last 24 hours) at 2021 1406  Last data filed at 2021 1100  Gross per 24 hour   Intake 1340 ml   Output 1160 ml   Net 180 ml       Physical Exam:     Physical Exam  Vitals signs and nursing note reviewed  Constitutional:       General: He is not in acute distress  Appearance: Normal appearance  He is well-developed  He is obese  He is not ill-appearing, toxic-appearing or diaphoretic  Comments: Chest tube in place   HENT:      Head: Normocephalic and atraumatic  Eyes:      General: No scleral icterus  Conjunctiva/sclera: Conjunctivae normal    Neck:      Musculoskeletal: Neck supple  Cardiovascular:      Rate and Rhythm: Normal rate and regular rhythm  Heart sounds: No murmur  No friction rub  No gallop  Pulmonary:      Effort: Pulmonary effort is normal  No respiratory distress  Breath sounds: No stridor  No wheezing, rhonchi or rales  Comments: Decreased breath sounds on the right side  Chest:      Chest wall: No tenderness  Abdominal:      General: Abdomen is flat  Bowel sounds are normal  There is no distension  Palpations: Abdomen is soft  Tenderness: There is no abdominal tenderness  Musculoskeletal:      Right lower leg: Edema present  Left lower leg: Edema present  Comments: 1+   Skin:     General: Skin is warm and dry  Capillary Refill: Capillary refill takes less than 2 seconds  Coloration: Skin is not jaundiced or pale  Findings: No erythema  Neurological:      General: No focal deficit present  Mental Status: He is alert and oriented to person, place, and time  Mental status is at baseline  Additional Data:     Labs:    Results from last 7 days   Lab Units 05/26/21  1055 05/26/21  0429   WBC Thousand/uL  --  6 00   HEMOGLOBIN g/dL 7 5* 7 6*   HEMATOCRIT % 23 5* 24 6*   PLATELETS Thousands/uL  --  191   NEUTROS PCT %  --  74   LYMPHS PCT %  --  11*   MONOS PCT %  --  9   EOS PCT %  --  4     Results from last 7 days   Lab Units 05/26/21  0429  05/22/21  0850   SODIUM mmol/L 135*   < > 129*   POTASSIUM mmol/L 4 8   < > 4 0   CHLORIDE mmol/L 96*   < > 94*   CO2 mmol/L 28   < > 30   BUN mg/dL 85*   < > 88*   CREATININE mg/dL 2 08*   < > 2 72*   ANION GAP mmol/L 11   < > 5   CALCIUM mg/dL 9 1   < > 8 3   ALBUMIN g/dL  --   --  3 0*   TOTAL BILIRUBIN mg/dL  --   --  0 46   ALK PHOS U/L  --   --  133*   ALT U/L  --   --  17   AST U/L  --   --  20   GLUCOSE RANDOM mg/dL 110   < > 116    < > = values in this interval not displayed       Results from last 7 days   Lab Units 05/24/21  1159   INR  1 44*         Results from last 7 days   Lab Units 05/21/21  0931   HEMOGLOBIN A1C % 5 5     Results from last 7 days   Lab Units 05/22/21  0850   LACTIC ACID mmol/L 1 2           * I Have Reviewed All Lab Data Listed Above  * Additional Pertinent Lab Tests Reviewed: [facility] Admission Reviewed    Imaging:    Imaging Reports Reviewed Today Include: none  Imaging Personally Reviewed by Myself Includes:  none    Recent Cultures (last 7 days):     Results from last 7 days   Lab Units 05/22/21  1738   GRAM STAIN RESULT  Rare Polys  No organisms seen   BODY FLUID CULTURE, STERILE  No growth       Last 24 Hours Medication List:   Current Facility-Administered Medications   Medication Dose Route Frequency Provider Last Rate    acetaminophen  650 mg Oral Q6H PRN Nika Johnson PA-C      alteplase (TPA) 10 mg in SWFI 30 mL chest tube/drain tube instillation  10 mg Chest Tube Once Nohelia Jordan PA-C      And    dornase calderon (PULMOZYME) 5 mg in 30 mL SWFI chest tube/drain tube instillation  5 mg Chest Tube Once Nohelia Jordan PA-C      bisacodyl  5 mg Oral Daily PRN Nika Johnson PA-C      calcium carbonate  1,000 mg Oral Daily PRN Nika Johnson PA-C      carvedilol  6 25 mg Oral BID With Meals Ca Flynn DO      ergocalciferol  50,000 Units Oral Weekly Nika Johnson PA-C      furosemide  40 mg Intravenous Once Bassem Parkinson MD      furosemide  80 mg Oral BID (diuretic) Luisa Garcia PA-C      HYDROmorphone  0 2 mg Intravenous Q6H PRN Eva Sullivan MD      nicotine  1 patch Transdermal Daily Nika Johnson PA-C      ondansetron  4 mg Intravenous Q6H PRN Nika Johnson PA-C      oxyCODONE-acetaminophen  1 tablet Oral Q6H PRN Eva Sullivan MD      polyethylene glycol  17 g Oral Daily PRN Nika Johnson PA-C          Today, Patient Was Seen By: Nika Johnson PA-C    ** Please Note: Dictation voice to text software may have been used in the creation of this document   **

## 2021-05-26 NOTE — ASSESSMENT & PLAN NOTE
· Patient transferred to [place] from [facility] for thoracic surgery evaluation  · History of recurrent right-sided pleural effusion s/p thoracentesis on 05/22, chest tube in place with continued output of bloody fluid  · Fluid-exudative but negative for malignancy  · Repeat chest x-ray on 05/24 with continued effusion  · CT scan performed on 05/26 revealed pleural effusion improvement with new hyperdensities consistent with lung hematomas  · Consult thoracic surgery for evaluation for VATs

## 2021-05-26 NOTE — ASSESSMENT & PLAN NOTE
· POA, in the setting of long term anticoagulation For paroxysmal AFib   · Resolved  · Patient on Coumadin with INR goal 2-3   · Patient with supratherapeutic INR of 4 07  · Received oral vitamin K at the [facility] emergency department   · INR 1 44 on 05/24  · Continue to hold Coumadin in the setting of continuous bloody effusion

## 2021-05-26 NOTE — ASSESSMENT & PLAN NOTE
INSURANCE COVERAGE REGARDING PAYMENT  FOR YOUR COLONOSCOPY        Colon Cancer is the second leading cause of death among cancers, per the [employer]. It is preventable. Early detection is the key. Your doctor will determine which tests need to be done for prevention and/or treatment. However, colonoscopies can be performed for several reasons:     Screening To screen for any problems within the colon. In this case, the patient is not symptomatic and does not have a personal history of previous colon cancer/condition or abnormal findings. Billed as screening.   Surveillance To monitor for a previously diagnosed colon condition (such as polyps) or when performed at more frequent intervals than every ten years because the patient has a personal/family history of colonic polyps or colon cancer. Billed as diagnostic.   Diagnostic Patient with symptoms, used to evaluate the colon. Billed as diagnostic.       If during a screening colonoscopy, our physician finds an abnormality, performs a biopsy or polypectomy (removal of polyp), your insurance company may consider the procedure to be a diagnostic exam and no longer a screening procedure.     Every insurance company is different. We encourage you to call your insurance company regarding plan benefits. Generally, screening benefits and diagnostic benefits may be paid at different levels. Charges associated with anesthesia or type of facility may also be processed differently. This varies with each insurance company, so we want you to be aware of this prior to your procedure. You do not have to call your insurance company if you have Medicare. For an estimate of potential charges, you may call the [facility] Patient Contact Center at [phone], option 5.     The authorization staff at [facility] will contact your insurance company to check precertification requirements for your colonoscopy. However, precertification, which serves as notification  · Nicotine patch ordered is never a guarantee of payment. If you have questions regarding precertification for your procedure, please contact your insurance company. If you need further assistance call our authorization department at [phone].

## 2021-05-26 NOTE — ASSESSMENT & PLAN NOTE
· Rate controlled on coreg  · Chronic anticoagulation with Coumadin 5 milligrams Sunday, Tuesday, Wednesday, Friday, Saturday and 2 5 milligrams Monday and Thursday  · Coumadin currently on hold given anemia and bloody output from chest tube

## 2021-05-26 NOTE — ASSESSMENT & PLAN NOTE
· Permanent atrial fibrillation   · Managed on Coreg 6 25 milligrams b i d     · Chronic anticoagulation with Coumadin 5 milligrams Sunday, Tuesday, Wednesday, Friday, Saturday and 2 5 milligrams Monday and Thursday  · Anticoagulated with Coumadin at home, currently on hold given anemia and bloody output from chest tube  · Managed by Dr Dirk Salinas

## 2021-05-26 NOTE — ASSESSMENT & PLAN NOTE
· Patient presented to [facility] with shortness of breath, found to have recurrent right-sided pleural effusion  · Transferred to [facility] for Interventional Radiology  · Thoracentesis on 05/22, chest tube in place with continued output of bloody fluid  · Fluid-exudative but negative for malignancy  · Infusion of tPA/dornase into chest tube attempted on 05/25, tube was clogged, Interventional Radiology consulted and subsequently unclogged chest tube  · Patient did not receive tPA due to clogged tube  · Repeat chest x-ray on 05/24 with continued effusion  · CT scan performed on 05/26 revealed pleural effusion improvement with new hyperdensities consistent with lung hematomas  · Pulmonology discussed this case with Dr Corey Steele of Cardiothoracic surgery at [facility] who agreed that the patient should be transferred to [facility] for thoracic surgery evaluation

## 2021-05-26 NOTE — ASSESSMENT & PLAN NOTE
Wt Readings from Last 3 Encounters:   05/26/21 131 kg (288 lb 12 8 oz)   05/22/21 (!) 136 kg (300 lb 0 7 oz)   04/22/21 (!) 137 kg (302 lb)     · Patient euvolemic on exam  · Followed by Dr Anna Sanderson  · Was admitted in early March for acute on chronic diastolic CHF, diuresed approximately 60 pounds during that admission  · Lasix 80 milligrams b i d -patient has been unable to receive this given low blood pressure  · Metolazone 5 milligrams twice a week  · Weight has remained stable   · Continue intake and output and daily weights

## 2021-05-26 NOTE — ASSESSMENT & PLAN NOTE
Lab Results   Component Value Date    HGBA1C 5 5 05/21/2021       No results for input(s): POCGLU in the last 72 hours      Blood Sugar Average: Last 72 hrs:  · Not on any home meds

## 2021-05-26 NOTE — CASE MANAGEMENT
Pt with a yellow readmission risk score  CM sent a message to pt's PCP office requesting a follow up appointment

## 2021-05-26 NOTE — ASSESSMENT & PLAN NOTE
Lab Results   Component Value Date    EGFR 33 05/26/2021    EGFR 29 05/25/2021    EGFR 28 05/24/2021    CREATININE 2 08 (H) 05/26/2021    CREATININE 2 31 (H) 05/25/2021    CREATININE 2 36 (H) 05/24/2021     · Creatinine remains stable at 2 3  · Nephrology following  · Continue Lasix 80 mg b i d   With hold parameters  · Continue holding ACE-inhibitor  · Stable hypotension  · Daily weights with intake and output

## 2021-05-26 NOTE — PROGRESS NOTES
Progress Note - Pulmonary   Yuniel Estimable 57 y o  male MRN: [id number]  Unit/Bed#: E2 -01 Encounter: [id number]    Assessment:  Right-sided pleural effusion   Acute on chronic diastolic CHF  MARISABEL on CKD   Cirrhosis   Afib on Coumadin  STEFF on CPAP   Tobacco abuse    Plan:  Patient's respiratory status is stable on room air  Patient underwent thora with removal of 2000 cc of fluid with chest tube placement following  Since then there was about 1900 cc additional output  Menan chest tube drainage system replaced yesterday  Chest tube remains connected to suction  Chest tube had significant clotting yesterday preventing successful tPA/dornase instillation, since has been unblocked by IR  Chest CT today showing smaller size of effusion, new hematomas in pleural space  Will discuss next steps/rec with thoracic regarding tPA instillation vs transfer for eval     Discussed with nursing, SLIM physician    Addendum: Spoke to on call thoracic physician, Dr Evelyne Velarde, recommending transfer to [place] for evaluation  Discussed with Nixon Chopra PA-C  Subjective:   Patient does not have any new complaints today  Worked with PT today, went up and down stairs without much difficulty  12 point review of systems otherwise negative  Objective:     Vitals: Blood pressure 90/58, pulse (!) 113, temperature (!) 96 1 °F (35 6 °C), temperature source Tympanic, resp  rate 18, height 5' 11" (1 803 m), weight 131 kg (288 lb 12 8 oz), SpO2 97 %  ,Body mass index is 40 28 kg/m²        Intake/Output Summary (Last 24 hours) at 5/26/2021 1138  Last data filed at 5/26/2021 1021  Gross per 24 hour   Intake 1220 ml   Output 1360 ml   Net -140 ml       Invasive Devices     Peripheral Intravenous Line            Peripheral IV 05/26/21 Left;Upper;Ventral (anterior) Arm less than 1 day          Drain            Chest Tube 1 Right 8 Fr  3 days                Physical Exam:   General appearance: Alert and oriented, in no acute distress  Head: Normocephalic, without obvious abnormality, atraumatic  Eyes: EOMI  No discharge bilaterally  No scleral icterus  Neck: Supple, symmetrical, trachea midline  Lungs:Decreased breath sounds on R, bloody drainage from R sided chest tube noted  Heart: tachycardia  Abdomen:  No appreciable distension or tenderness  Extremities:+edema   Skin: Warm and dry  Neurologic: No acute focal deficits are noted    Labs: I have personally reviewed pertinent lab results  Imaging and other studies: I have personally reviewed pertinent reports     and I have personally reviewed pertinent films in PACS

## 2021-05-26 NOTE — ASSESSMENT & PLAN NOTE
· Last seen by outpatient GI on 04/08   · [facility]  screening is up-to-date   · Last EGD did not show any esophageal varices   · Likely contributing to his supratherapeutic INR and anemia  · Suspect fatty liver vs cardiac in nature

## 2021-05-26 NOTE — ASSESSMENT & PLAN NOTE
· Last seen by outpatient GI on 04/08   · [facility]  screening is up-to-date   · Last EGD did not show any esophageal varices   · Likely contributing to his supratherapeutic INR and anemia  · Suspect fatty liver vs cardiac in nature

## 2021-05-26 NOTE — ASSESSMENT & PLAN NOTE
· Last seen by outpatient GI on 04/08   · [facility]  screening is up-to-date   · Last EGD did not show any esophageal varices

## 2021-05-26 NOTE — PROGRESS NOTES
NEPHROLOGY PROGRESS NOTE   Clint Miles 57 y o  male MRN: [id number]  Unit/Bed#: E2 -01 Encounter: [id number]      ASSESSMENT & PLAN:  1  MARISABEL on top of CKD stage 3, baseline Cr around 1 6, follows with Dr Chong Talley    MARISABEL 2/2 CRS, prior ACE-I, hypotension, anemia that lead to ATN  Cr on admission 2 8, down to 2 08 today  Keep holding ACE-I, avoid relative hypotension  continue p o  diuretics with holding parameters for low blood pressure  agree with midodrine as below  Follow daily weight and I/O  Follow daily labs    2  Volume overload with right pleural effusion, on home diuretics but on hold due to hypotension  S/p thoracentesis and now with CT in place  Pulmonology on board  IR consult for re-evaluation of chest tune    3  Hemodynamics, chronic hypotension, agree with midodrine but recommend increase dose to 5 mg TID to improve BP and resume diuretics    4  Cirrhosis suspected ETOH vs fatty liver vs cardiac cirrhosis    5  Anemia,  S/p blood transfusion yesterday, monitor H&H  Hgb stable at 7 6 today    My plan and recommendations were discussed with Internal Medicine attending      SUBJECTIVE:  Patient seen and examined, complaining of some discomfort around chest tube area, no chest pain, no significant shortness of breath  Events reviewed - chest tube clogged, IR consulted for re-evaluation  BP low - diuretic on hold due to parameters, believe his legs are more swollen          OBJECTIVE:  Current Weight: Weight - Scale: 131 kg (288 lb 12 8 oz)  Vitals:    05/26/21 0920   BP: (!) 86/52   Pulse: (!) 110   Resp: 18   Temp:    SpO2: 100%       Intake/Output Summary (Last 24 hours) at 5/26/2021 1045  Last data filed at 5/26/2021 1021  Gross per 24 hour   Intake 1470 ml   Output 1360 ml   Net 110 ml     General: conscious, cooperative, in not acute distress  Eyes: conjunctivae pale, anicteric sclerae  ENT: lips and mucous membranes moist  Neck: supple, no JVD  Chest: clear breath sounds bilateral, no crackles, ronchus or wheezing, right sided CT in place  CVS: distinct S1 & S2, normal rate, regular rhythm  Abdomen: obese, non-tender, non-distended, normoactive bowel sounds  Back: no CVA tenderness  Extremities: 2+edema of both legs  Skin: no rash  Neuro: awake, alert, oriented        Medications:    Current Facility-Administered Medications:     acetaminophen (TYLENOL) tablet 650 mg, 650 mg, Oral, Q6H PRN, Frann Severe, PA-C    alteplase (TPA) 10 mg in SWFI 30 mL chest tube/drain tube instillation, 10 mg, Chest Tube, Once **AND** dornase calderon (PULMOZYME) 5 mg in 30 mL SWFI chest tube/drain tube instillation, 5 mg, Chest Tube, Once, Hoa Salazar PA-C    bisacodyl (DULCOLAX) EC tablet 5 mg, 5 mg, Oral, Daily PRN, Frann Severe, PA-C, 5 mg at 05/25/21 1845    calcium carbonate (TUMS) chewable tablet 1,000 mg, 1,000 mg, Oral, Daily PRN, Frann Severe, PA-C    carvedilol (COREG) tablet 6 25 mg, 6 25 mg, Oral, BID With Meals, Glory Quevedo DO, Stopped at 05/25/21 0856    ergocalciferol (VITAMIN D2) capsule 50,000 Units, 50,000 Units, Oral, Weekly, Frann Severe, PA-C    furosemide (LASIX) injection 40 mg, 40 mg, Intravenous, Once, Claudette Frames, MD    furosemide (LASIX) tablet 80 mg, 80 mg, Oral, BID (diuretic), Dianne Sylvester PA-C, Stopped at 05/25/21 0856    HYDROmorphone HCl (DILAUDID) injection 0 2 mg, 0 2 mg, Intravenous, Q6H PRN, Alex Ronquillo MD, 0 2 mg at 05/24/21 2308    nicotine (NICODERM CQ) 14 mg/24hr TD 24 hr patch 1 patch, 1 patch, Transdermal, Daily, Frann Severe, PA-C    ondansetron [facility]) injection 4 mg, 4 mg, Intravenous, Q6H PRN, Frann Severe, PA-C    oxyCODONE-acetaminophen (PERCOCET) 5-325 mg per tablet 1 tablet, 1 tablet, Oral, Q6H PRN, Alex Ronquillo MD, 1 tablet at 05/26/21 1016    polyethylene glycol (MIRALAX) packet 17 g, 17 g, Oral, Daily PRN, Frann Severe, PA-C, 17 g at 05/26/21 0350    Invasive Devices:        Lab Results:   Results from last 7 days   Lab Units 05/26/21  0429 05/25/21  1808 05/25/21  0520 05/24/21  0728  05/22/21  0850 05/21/21  0931   WBC Thousand/uL 6 00  --  5 01 4 39   < > 3 74* 4 18*   HEMOGLOBIN g/dL 7 6* 7 1* 6 8* 7 7*   < > 7 6* 7 4*   HEMATOCRIT % 24 6*  --  21 7* 25 0*   < > 23 4* 23 9*   PLATELETS Thousands/uL 191  --  166 173   < > 162 170   SODIUM mmol/L 135*  --  135* 137   < > 129* 131*   POTASSIUM mmol/L 4 8  --  4 9 4 3   < > 4 0 4 2   CHLORIDE mmol/L 96*  --  97* 97*   < > 94* 96*   CO2 mmol/L 28  --  30 30   < > 30 30   BUN mg/dL 85*  --  85* 80*   < > 88* 93*   CREATININE mg/dL 2 08*  --  2 31* 2 36*   < > 2 72* 2 80*   CALCIUM mg/dL 9 1  --  9 2 9 4   < > 8 3 8 6   MAGNESIUM mg/dL  --   --   --   --   --   --  2 3   PHOSPHORUS mg/dL  --   --   --   --   --   --  5 2*   ALK PHOS U/L  --   --   --   --   --  133* 130*   ALT U/L  --   --   --   --   --  17 16   AST U/L  --   --   --   --   --  20 20    < > = values in this interval not displayed  Previous work up:  See previous notes      Portions of the record may have been created with voice recognition software  Occasional wrong word or "sound a like" substitutions may have occurred due to the inherent limitations of voice recognition software  Read the chart carefully and recognize, using context, where substitutions have occurred  If you have any questions, please contact the dictating provider

## 2021-05-26 NOTE — ASSESSMENT & PLAN NOTE
· POA, in the setting of long term anticoagulation For paroxysmal AFib   · Resolved  · Patient on Coumadin with INR goal 2-3   · Patient with supratherapeutic INR of 4 07  · Received oral vitamin K at the [facility] emergency department   · INR 1 44 on 05/24  · Continue to hold Coumadin in the setting of continuous bloody effusion

## 2021-05-26 NOTE — ASSESSMENT & PLAN NOTE
· Creatinine remains stable    · Requested nephro to continue to follow  · Continue Lasix 80 mg b i d   With hold parameters  · Continue holding ACE-inhibitor  · Stable hypotension  · Daily weights with intake and output

## 2021-05-26 NOTE — CASE MANAGEMENT
LOS: 4 GMLOS: 4 3 RISK OF READMISSION: 28 BUNDLE: NO    CM met with pt at bedside to discuss discharge needs  Pt lives in a 2 story house with his wife; 4STE  ADL's are completed independently with no DME use  Pt does have 2 canes and crutches  PCP identified as Dr Lara Manual  Pharmacy identified as [facility]   Pt reports hx of VNA but did not enjoy his experience  Pt has no hx of STR  Pt does drive and will have a ride home when stable  Pt does not have a POA but wife would be healthcare agent  No needs expressed or identified at this time  CM will continue to follow as needed  CM reviewed d/c planning process including the following: identifying help at home, patient preference for d/c planning needs, Discharge Lounge, [facility] Meds to Bed program, availability of treatment team to discuss questions or concerns patient and/or family may have regarding understanding medications and recognizing signs and symptoms once discharged  CM also encouraged patient to follow up with all recommended appointments after discharge  Patient advised of importance for patient and family to participate in managing patients medical well being

## 2021-05-26 NOTE — PHYSICAL THERAPY NOTE
05/26/21 0936   PT Last Visit   PT Visit Date 05/26/21   Note Type   Note Type Treatment   Pain Assessment   Pain Assessment Tool Pain Assessment not indicated - pt denies pain   Restrictions/Precautions   Weight Bearing Precautions Per Order No   Other Precautions Multiple lines; Fall Risk;Pain  (chest tube)   General   Chart Reviewed Yes   Family/Caregiver Present No   Cognition   Overall Cognitive Status WFL   Arousal/Participation Alert; Cooperative   Attention Within functional limits   Orientation Level Oriented X4   Memory Within functional limits   Following Commands Follows all commands and directions without difficulty   Comments pt agreed to PT session, pleasant & cooperative   Subjective   Subjective "I'm feeling better today  The cramping in my hands is gone today "   Bed Mobility   Additional Comments pt seated OOB in chair to begin & end session, all needs in reach   Transfers   Sit to Stand 5  Supervision   Additional items Assist x 1; Armrests; Verbal cues   Stand to Sit 5  Supervision   Additional items Armrests; Verbal cues   Additional Comments vcs for safety & technique, wide NICOLLE & increased time to complete   Ambulation/Elevation   Gait pattern Improper Weight shift;Decreased foot clearance; Wide NICOLLE; Inconsistent jia; Short stride   Gait Assistance 5  Supervision   Additional items Verbal cues   Assistive Device None   Distance 120 feet   Stair Management Assistance 5  Supervision   Additional items Verbal cues; Increased time required   Stair Management Technique One rail L;Alternating pattern; Foreward   Number of Stairs 10  (training stairs 5 x 2 reps)   Balance   Static Sitting Good   Dynamic Sitting Good   Static Standing Fair   Dynamic Standing Fair -   Ambulatory Fair -   Endurance Deficit   Endurance Deficit Yes   Endurance Deficit Description fatigue, SOB when amb wearing mask in hallway   Activity Tolerance   Activity Tolerance Patient tolerated treatment well;Patient limited by fatigue   Nurse Made Aware JULIA Wright   Exercises   Hip Flexion Sitting;20 reps;AROM; Bilateral   Hip Abduction Sitting;20 reps;AROM; Bilateral   Hip Adduction Sitting;20 reps;AROM; Bilateral  (isometric)   Knee AROM Long Arc Quad Sitting;20 reps;AROM; Bilateral   Ankle Pumps Sitting;20 reps;AROM; Bilateral   Assessment   Prognosis Good   Problem List Decreased strength;Decreased endurance; Impaired balance;Decreased mobility;Obesity; Decreased skin integrity;Pain   Assessment Pt seen for PT treatment session this date with interventions consisting of gait training w/ emphasis on improving pt's ability to ambulate level surfaces x 120 feet with close S provided by therapist with no AD and stair training with 1 rail step over step with S x 1 & vcs for safety 10 stairs with pt managing chest tube while ambulating & stair climbing and Therapeutic exercise consisting of: AROM 20 reps B LE in sitting position  Pt agreeable to PT treatment session upon arrival, pt found seated OOB in chair, in no apparent distress  In comparison to previous session, pt with improvements in amb distance, stair climbing & ex tolerance while managing his chest tube  [facility] session[facility] all needs in reach and RN notified of session findings/recommendations  Continue to recommend no rehabilitation needs at time of d/c in order to maximize pt's functional independence and safety w/ mobility  Pt continues to be functioning below baseline level, and remains limited 2* factors listed above and including decreased strength, endurance & safe functional mobility  PT will continue to see pt during current hospitalization in order to address the deficits listed above and provide interventions consistent w/ POC in effort to achieve STGs     Barriers to Discharge Comments pt able to safely ascend & descend 10 stairs with 1 rail step over step   Goals   Patient Goals to get home soon   PT Treatment Day 1   Plan   Treatment/Interventions Functional transfer training;LE strengthening/ROM; Elevations; Therapeutic exercise; Endurance training;Patient/family training;Equipment eval/education; Bed mobility;Gait training;Spoke to nursing   Progress Progressing toward goals   PT Frequency 2-3x/wk   Recommendation   PT Discharge Recommendation No rehabilitation needs   AM-PAC Basic Mobility Inpatient   Turning in Bed Without Bedrails 3   Lying on Back to Sitting on Edge of Flat Bed 4   Moving Bed to Chair 4   Standing Up From Chair 4   Walk in Room 4   Climb 3-5 Stairs 3   Basic Mobility Inpatient Raw Score 22   Basic Mobility Standardized Score 47 4   The patient's AM-PAC Basic Mobility Inpatient Short Form Raw Score is 22, Standardized Score is 47 4  A standardized score greater than 42 9 suggests the patient may benefit from discharge to home   Please also refer to the recommendation of the Physical Therapist for safe discharge planning as discussed with Physical Therapist   Mame Granados PTA

## 2021-05-26 NOTE — ASSESSMENT & PLAN NOTE
· With baseline hemoglobin approximately 9 5   · Patient received 1 unit of PRBC on 05/25/2021 due to bloody chest tube drainage and hematoma  · Continue to monitor H&H

## 2021-05-26 NOTE — ASSESSMENT & PLAN NOTE
Wt Readings from Last 3 Encounters:   05/26/21 131 kg (288 lb 12 8 oz)   05/22/21 (!) 136 kg (300 lb 0 7 oz)   04/22/21 (!) 137 kg (302 lb)     · Patient euvolemic on exam  · Followed by Dr Mary Ellen Oliveira  · Was admitted in early March for acute on chronic diastolic CHF, diuresed approximately 60 pounds during that admission  · Lasix 80 milligrams b i d -patient has been unable to receive this given low blood pressure  · Metolazone 5 milligrams twice a week  · Weight has remained stable   · Continue intake and output and daily weights

## 2021-05-26 NOTE — ASSESSMENT & PLAN NOTE
· With baseline hemoglobin approximately 9 5   · Hemoglobin 6 8 on 5 25  · Status post 1 unit PRBCs transfusion  · Responded appropriately, repeat hemoglobin 7 1 most recent 7 6  · Patient hypotensive and tachycardic this a m- suspect likely secondary to pulmonary hematomas  · Continue to transfuse as needed below 7

## 2021-05-26 NOTE — H&P
[facility] 1960, 57 y o  male MRN: [id number]  Unit/Bed#: [facility] 422-01 Encounter: [id number]  Primary Care Provider: Roxana Matta DO   Date and time admitted to hospital: No admission date for patient encounter  * Pleural effusion on right  Assessment & Plan  · Patient transferred [facility] for thoracic surgery evaluation  · History of recurrent right-sided pleural effusion s/p thoracentesis on 05/22, chest tube in place with continued output of bloody fluid  · Fluid-exudative but negative for malignancy  · Repeat chest x-ray on 05/24 with continued effusion  · CT scan performed on 05/26 revealed pleural effusion improvement with new hyperdensities consistent with lung hematomas  · Consult thoracic surgery for evaluation for VATs    MARISABEL (acute kidney injury) ([facility] )  Assessment & Plan  · Creatinine remains stable    · Requested nephro to continue to follow  · Continue Lasix 80 mg b i d  With hold parameters  · Continue holding ACE-inhibitor  · Stable hypotension  · Daily weights with intake and output    Chronic anemia  Assessment & Plan  · With baseline hemoglobin approximately 9 5   · Patient received 1 unit of PRBC on 05/25/2021 due to bloody chest tube drainage and hematoma  · Continue to monitor H&H    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 ascites ([facility] )  Assessment & Plan  · Last seen by outpatient GI on 04/08   · [place] [address]  screening is up-to-date   · Last EGD did not show any esophageal varices       Type 2 diabetes mellitus with diabetic chronic kidney disease ([place] [address] )  Assessment & Plan  Lab Results   Component Value Date    HGBA1C 5 5 05/21/2021       No results for input(s): POCGLU in the last 72 hours      Blood Sugar Average: Last 72 hrs:  · Not on any home meds      Chronic diastolic (congestive) heart failure (HCC)  Assessment & Plan  Wt Readings from Last 3 Encounters:   05/26/21 131 kg (288 lb 12 8 oz)   05/22/21 (!) 136 kg (300 lb 0 7 oz)   04/22/21 (!) 137 kg (302 lb)     · Patient euvolemic on exam  · Followed by Dr Guillermo Lacy OP  · Lasix 80 milligrams b i d -patient has been unable to receive this given low blood pressure  · Metolazone 5 milligrams twice a week  · Weight has remained stable   · Continue intake and output and daily weights          Atrial fibrillation ([address] )  Assessment & Plan  · Rate controlled on coreg  · Chronic anticoagulation with Coumadin 5 milligrams Sunday, Tuesday, Wednesday, Friday, Saturday and 2 5 milligrams Monday and Thursday  · Coumadin currently on hold given anemia and bloody output from chest tube      VTE Prophylaxis: Pharmacologic VTE Prophylaxis contraindicated due to Continued bloody drainage from chest tube, acute anemia  / sequential compression device   Code Status: full code      Anticipated Length of Stay:  Patient will be admitted on an Inpatient basis with an anticipated length of stay of  > 2 midnights  Justification for Hospital Stay:  Pleural effusion need thoracic surgery     Total Time for Visit, including Counseling / Coordination of Care: 60 minutes  Greater than 50% of this total time spent on direct patient counseling and coordination of care  Chief Complaint:   Dyspnea, pleural effusion    History of Present Illness:    Jacquleyn Pal is a 57 y o  male transferred from [facility] to [facility] for thoracic surgery evaluation  Patient was admitted in [facility] for recurrent right-sided pleural effusion status post thoracentesis and chest tube placement on 05/22/2021  Patient continue to have bloody drainage from chest tube, that caused acute anemia requiring 1 unit PRBC transfusion  Patient had repeat chest CT today that shows smaller size of effusion however suggestive of new hematoma in pleural space  Therefore pulmonology recommended patient to be transferred for thoracic surgery evaluation  On my exam, patient is breathing comfortably in room air  Patient denies any chest pain, or palpitation  He does have bilateral pitting edema due to not being able to elevate his legs  Review of Systems:    Review of Systems   Constitutional: Negative for activity change and appetite change  HENT: Negative for congestion and sore throat  Eyes: Negative for pain and visual disturbance  Respiratory: Positive for shortness of breath  Negative for cough  Cardiovascular: Positive for leg swelling  Negative for chest pain  Gastrointestinal: Negative for abdominal distention and abdominal pain  Endocrine: Negative for polyuria  Genitourinary: Negative for difficulty urinating  Musculoskeletal: Negative for arthralgias and joint swelling  Skin: Negative for wound  Allergic/Immunologic: Negative for food allergies  Neurological: Negative for light-headedness and headaches  Hematological: Negative for adenopathy  Psychiatric/Behavioral: Negative for sleep disturbance  Past Medical and Surgical History:     Past Medical History:   Diagnosis Date    A-fib [facility])     Cardiac disease     Chronic combined systolic (congestive) and diastolic (congestive) heart failure (HCC)     Diabetes mellitus ([facility] )     Dilated cardiomyopathy ([facility] )     History of echocardiogram 11/24/2014    EF 0 30 (30%), Likely mod LV systolic dysfunction  Likely RV dysfunction as well   History of Lexiscan MPI 02/19/2016    EF 0 43 (43%), no prior MI or ischemia   Hx of echocardiogram 04/28/2017    Normal EF, Normal LV systolic function  Mild concentric LV hypertrophy  Mild mitral and tricuspid regurg      Hx: recurrent pneumonia     Hypertension     Long term (current) use of anticoagulants     Morbid (severe) obesity due to excess calories (HCC)     Neuropathy        Past Surgical History:   Procedure Laterality Date    HERNIA REPAIR      IR CHEST TUBE PLACEMENT  5/22/2021    IR THORACENTESIS  8/25/2020    IR THORACENTESIS  2/23/2021    SPLENECTOMY, TOTAL  VASCULAR SURGERY Right     leg       Meds/Allergies:    Prior to Admission medications    Medication Sig Start Date End Date Taking? Authorizing Provider   carvedilol (COREG) 6 25 mg tablet Take 1 tablet (6 25 mg total) by mouth 2 (two) times a day with meals  Patient not taking: Reported on 5/22/2021 3/15/21   TRES Tom   docusate sodium (COLACE) 100 mg capsule Take 1 capsule (100 mg total) by mouth 2 (two) times a day as needed for constipation  Patient not taking: Reported on 5/22/2021 3/11/21   Chastity Stacy DO   ergocalciferol (ERGOCALCIFEROL) 1 25 MG (45808 UT) capsule Take 1 capsule (50,000 Units total) by mouth once a week 3/15/21   TRES Tom   ferrous sulfate 324 (65 Fe) mg Take 1 tablet (324 mg total) by mouth 2 (two) times a day before meals  Patient not taking: Reported on 5/22/2021 4/8/21   Cain Chapa DO   furosemide (LASIX) 80 mg tablet Take 1 tablet (80 mg total) by mouth 2 (two) times a day 3/15/21   TRES Tom   glucose blood test strip Test glucose once a day  4/14/21   Stefan Chapa DO   Lancets (freestyle) lancets Test glucose once a day  4/14/21   Stefan Chapa DO   lisinopril (ZESTRIL) 5 mg tablet Take 1 tablet (5 mg total) by mouth daily 4/30/21   Cain Chapa DO   metolazone (ZAROXOLYN) 5 mg tablet Take one twice a week or daily prn 3 lb weight gain in 24 hours  5/5/21   Tammy Cerda DO   omeprazole (PriLOSEC) 20 mg delayed release capsule Take 1 capsule (20 mg total) by mouth 2 (two) times a day  Patient not taking: Reported on 4/22/2021 3/11/21   Chastity Stacy DO   polyethylene glycol (MIRALAX) 17 g packet Take 17 g by mouth daily as needed (constipation)  Patient not taking: Reported on 5/22/2021 3/11/21   Chastity Stacy DO   potassium chloride (K-DUR,KLOR-CON) 20 mEq tablet Take 3 daily 4/1/21   Tammy Cerda DO   warfarin (COUMADIN) 5 mg tablet Take 1/2 to 1 tablet by oral route daily   11/12/20   Jt Elizondo PA-C     I have reviewed home medications using allscripts  Allergies: No Known Allergies    Social History:     Marital Status: /Civil Union      Substance Use History:   Social History     Substance and Sexual Activity   Alcohol Use Yes    Alcohol/week: 14 0 standard drinks    Types: 5 Glasses of wine, 5 Cans of beer, 4 Shots of liquor per week    Frequency: 2-3 times a week    Drinks per session: 5 or 6    Binge frequency: Never    Comment: WEEKLY     Social History     Tobacco Use   Smoking Status Current Some Day Smoker    Packs/day: 0 25    Types: Cigarettes   Smokeless Tobacco Never Used   Tobacco Comment    2-3 CIGARETTES DAILY     Social History     Substance and Sexual Activity   Drug Use Never       Family History:    No family history on file  Physical Exam:     Vitals:        Physical Exam  Vitals signs and nursing note reviewed  Constitutional:       General: He is not in acute distress  Appearance: Normal appearance  He is obese  HENT:      Head: Normocephalic and atraumatic  Right Ear: External ear normal       Left Ear: External ear normal       Nose: Nose normal    Eyes:      Extraocular Movements: Extraocular movements intact  Neck:      Musculoskeletal: Normal range of motion  Cardiovascular:      Rate and Rhythm: Tachycardia present  Rhythm irregular  Pulses: Normal pulses  Pulmonary:      Effort: Pulmonary effort is normal  No respiratory distress  Abdominal:      General: Bowel sounds are normal    Musculoskeletal:      Right lower leg: Edema present  Left lower leg: Edema present  Skin:     General: Skin is warm and dry  Neurological:      Mental Status: He is alert  Mental status is at baseline  Psychiatric:         Mood and Affect: Mood normal          Behavior: Behavior normal             Additional Data:     Lab Results: I have personally reviewed pertinent reports        Results from last 7 days   Lab Units 05/26/21  1055 05/26/21  0429   WBC Thousand/uL  --  6 00   HEMOGLOBIN g/dL 7 5* 7 6*   HEMATOCRIT % 23 5* 24 6*   PLATELETS Thousands/uL  --  191   NEUTROS PCT %  --  74   LYMPHS PCT %  --  11*   MONOS PCT %  --  9   EOS PCT %  --  4     Results from last 7 days   Lab Units 05/26/21  0429  05/22/21  0850   SODIUM mmol/L 135*   < > 129*   POTASSIUM mmol/L 4 8   < > 4 0   CHLORIDE mmol/L 96*   < > 94*   CO2 mmol/L 28   < > 30   BUN mg/dL 85*   < > 88*   CREATININE mg/dL 2 08*   < > 2 72*   ANION GAP mmol/L 11   < > 5   CALCIUM mg/dL 9 1   < > 8 3   ALBUMIN g/dL  --   --  3 0*   TOTAL BILIRUBIN mg/dL  --   --  0 46   ALK PHOS U/L  --   --  133*   ALT U/L  --   --  17   AST U/L  --   --  20   GLUCOSE RANDOM mg/dL 110   < > 116    < > = values in this interval not displayed  Results from last 7 days   Lab Units 05/24/21  1159   INR  1 44*         Results from last 7 days   Lab Units 05/21/21  0931   HEMOGLOBIN A1C % 5 5     Results from last 7 days   Lab Units 05/22/21  0850   LACTIC ACID mmol/L 1 2       Imaging: I have personally reviewed pertinent reports  No orders to display       EKG, Pathology, and Other Studies Reviewed on Admission:   · EKG: reviewed    [facility] Reviewed: Yes     ** Please Note: This note has been constructed using a voice recognition system   **

## 2021-05-27 PROBLEM — I95.9 HYPOTENSION: Status: ACTIVE | Noted: 2021-01-01

## 2021-05-27 PROBLEM — N18.9 ACUTE KIDNEY INJURY SUPERIMPOSED ON CKD (HCC): Status: ACTIVE | Noted: 2021-01-01

## 2021-05-27 NOTE — PLAN OF CARE
Problem: PHYSICAL THERAPY ADULT  Goal: Performs mobility at highest level of function for planned discharge setting  See evaluation for individualized goals  Description: Treatment/Interventions: Functional transfer training, LE strengthening/ROM, Elevations, Therapeutic exercise, Endurance training, Bed mobility, Gait training, Spoke to nursing, Spoke to case management, OT          See flowsheet documentation for full assessment, interventions and recommendations  Note: Prognosis: Good  Problem List: Decreased strength, Decreased endurance, Decreased mobility, Impaired balance  Assessment: Pt is a 57 y o  male seen for PT evaluation s/p admit to [address] [place] [address] on 5/26/2021  Pt was admitted with a primary dx of: pleural effusion on R  Pt is s/p R chest tube placement on 5/22  PT now consulted for assessment of mobility and d/c needs  Pt with Up and OOB as tolerated , PT evaluation orders  Pts current comorbidities effecting treatment include: A-fib, cardiac disease, CHF, DM, dilated cardiomyopathy, recurrent pneumonia, HTN, morbid obesity, neuropathy  Pts current clinical presentation is Unstable/ Unpredictable (high complexity) due to Ongoing medical management for primary dx, Increased reliance on more restrictive AD compared to baseline, Decreased activity tolerance compared to baseline, Fall risk, Increased assistance needed from caregiver at current time, Ongoing telemetry monitoring, s/p surgical intervention  Prior to admission, pt was I with ADLs and ambulating w/o AD  Pt lives with his wife in a [address] with 4 [address]ZEKIEL, bed/bath on second floor  Upon evaluation, pt currently is requiring supervision for transfers and supervision for ambulation 120 ft w/ RW   Pt presents at PT eval functioning below baseline and currently w/ overall mobility deficits 2* to: BLE weakness, impaired balance, decreased endurance, gait deviations, decreased activity tolerance compared to baseline, decreased functional mobility tolerance compared to baseline, fall risk, SOB upon exertion  Pt currently at a fall risk 2* to impairments listed above  Pt will continue to benefit from skilled acute PT interventions to address stated impairments; to maximize functional mobility; for ongoing pt/ family training; and DME needs  At conclusion of PT session pt returned back in chair with phone and call bell within reach  Pt denies any further questions at this time  Recommend home with social support upon hospital D/C  PT Discharge Recommendation: No rehabilitation needs          See flowsheet documentation for full assessment

## 2021-05-27 NOTE — ASSESSMENT & PLAN NOTE
· HR elevated due to resp status  · on coreg with BP on low side  · Chronic anticoagulation with Coumadin 5 milligrams Sunday, Tuesday, Wednesday, Friday, Saturday and 2 5 milligrams Monday and Thursday  · Coumadin currently on hold given anemia and bloody output from chest tube

## 2021-05-27 NOTE — CONSULTS
Consultation - Thoracic Surgery   Jose Ortiz 57 y o  male MRN: [id number]  Unit/Bed#: [facility] 422-01 Encounter: [id number]      Assessment/Plan     Assessment:  58 yo M with PMH of HTN, DM, CHF, Afib, neuropathy, and cirrhosis who presents with recurrent/persistent R sided pleural effusion  Exudative on body fluid analysis  AVSS on 3L NC  R CT - Suction, no air leak, serosanguinous output    Plan:  Maintain R CT to suction  O2 as needed  Consider tPA/Dornase into R CT today 5/27  If no resolution, can consider VATS  Rest of care per primary    History of Present Illness   Reason for Consult / Principal Problem: Recurrent/persistent R pleural effusion  HPI: Jose Ortiz is a 57y o  year old male who presents with A persistent right pleural effusion despite right chest tube placement  Patient reports that he was fishing last week when he started to notice that he was becoming short of breath  He subsequently presented to the hospital  at [facility] was found to have a right pleural effusion  Thoracentesis  Was performed and fluid was sent for analysis, showing it to be exudative in nature  A right pigtail chest tube was placed  Right pigtail chest tube was [facility] on 05/22 at [facility] [place]  Repeat imaging on 05/26 showed persistent right pleural effusion, so he was transferred to [address] [place] [address] for thoracic surgery evaluation  Inpatient consult to Thoracic Surgery     Performed by  Eva Schroeder MD     Authorized by Sin Sanderson MD             Review of Systems   Constitutional: Negative  HENT: Negative  Eyes: Negative  Respiratory: Negative  Negative for shortness of breath  Minimal pain around right chest tube insertion site   Cardiovascular: Negative  Gastrointestinal: Negative  Endocrine: Negative  Genitourinary: Negative  Musculoskeletal: Negative  Skin: Negative  Neurological: Negative  Psychiatric/Behavioral: Negative             Historical Information   Past Medical History:   Diagnosis Date    A-fib [facility])     Cardiac disease     Chronic combined systolic (congestive) and diastolic (congestive) heart failure (HCC)     [facility] mellitus [facility] )     Dilated cardiomyopathy ([facility] )     History of echocardiogram 11/24/2014    EF 0 30 (30%), Likely mod LV systolic dysfunction  Likely RV dysfunction as well   History of Lexiscan MPI 02/19/2016    EF 0 43 (43%), no prior MI or ischemia   Hx of echocardiogram 04/28/2017    Normal EF, Normal LV systolic function  Mild concentric LV hypertrophy  Mild mitral and tricuspid regurg   Hx: recurrent pneumonia     Hypertension     Long term (current) use of anticoagulants     Morbid (severe) obesity due to excess calories (HCC)     Neuropathy      Past Surgical History:   Procedure Laterality Date    HERNIA REPAIR      IR CHEST TUBE PLACEMENT  5/22/2021    IR THORACENTESIS  8/25/2020    IR THORACENTESIS  2/23/2021    SPLENECTOMY, TOTAL      VASCULAR SURGERY Right     leg     Social History     Substance and Sexual Activity   Alcohol Use Yes    Alcohol/week: 14 0 standard drinks    Types: 5 Glasses of wine, 5 Cans of beer, 4 Shots of liquor per week    Frequency: 2-3 times a week    Drinks per session: 5 or 6    Binge frequency: Never    Comment: WEEKLY     Social History     Substance and Sexual Activity   Drug Use Never     E-Cigarette/Vaping    E-Cigarette Use Never User      E-Cigarette/Vaping Substances    Nicotine No     THC No     CBD No     Flavoring No     Other No     Unknown No      Social History     Tobacco Use   Smoking Status Current Some Day Smoker    Packs/day: 0 25    Types: Cigarettes   Smokeless Tobacco Never Used   Tobacco Comment    2-3 CIGARETTES DAILY     Family History: No family history on file      Meds/Allergies   current meds:   Current Facility-Administered Medications   Medication Dose Route Frequency    acetaminophen (TYLENOL) tablet 650 mg  650 mg Oral Q6H PRN    bisacodyl (DULCOLAX) EC tablet 5 mg  5 mg Oral Daily PRN    calcium carbonate (TUMS) chewable tablet 1,000 mg  1,000 mg Oral Daily PRN    carvedilol (COREG) tablet 6 25 mg  6 25 mg Oral BID With Meals    [START ON 5/29/2021] ergocalciferol (VITAMIN D2) capsule 50,000 Units  50,000 Units Oral Weekly    furosemide (LASIX) tablet 80 mg  80 mg Oral BID (diuretic)    HYDROmorphone HCl (DILAUDID) injection 0 2 mg  0 2 mg Intravenous Q6H PRN    metoprolol (LOPRESSOR) injection 2 5 mg  2 5 mg Intravenous Q6H PRN    nicotine (NICODERM CQ) 14 mg/24hr TD 24 hr patch 1 patch  1 patch Transdermal Daily    ondansetron (ZOFRAN) injection 4 mg  4 mg Intravenous Q6H PRN    oxyCODONE-acetaminophen (PERCOCET) 5-325 mg per tablet 1 tablet  1 tablet Oral Q6H PRN    polyethylene glycol (MIRALAX) packet 17 g  17 g Oral Daily PRN    and PTA meds:   Prior to Admission Medications   Prescriptions Last Dose Informant Patient Reported? Taking? Lancets (freestyle) lancets  Self No No   Sig: Test glucose once a day  carvedilol (COREG) 6 25 mg tablet  Self No No   Sig: Take 1 tablet (6 25 mg total) by mouth 2 (two) times a day with meals   Patient not taking: Reported on 5/22/2021   docusate sodium (COLACE) 100 mg capsule  Self No No   Sig: Take 1 capsule (100 mg total) by mouth 2 (two) times a day as needed for constipation   Patient not taking: Reported on 5/22/2021   ergocalciferol (ERGOCALCIFEROL) 1 25 MG (16253 UT) capsule  Self No No   Sig: Take 1 capsule (50,000 Units total) by mouth once a week   ferrous sulfate 324 (65 Fe) mg  Self No No   Sig: Take 1 tablet (324 mg total) by mouth 2 (two) times a day before meals   Patient not taking: Reported on 5/22/2021   furosemide (LASIX) 80 mg tablet  Self No No   Sig: Take 1 tablet (80 mg total) by mouth 2 (two) times a day   glucose blood test strip  Self No No   Sig: Test glucose once a day     lisinopril (ZESTRIL) 5 mg tablet   No No   Sig: Take 1 tablet (5 mg total) by mouth daily   metolazone (ZAROXOLYN) 5 mg tablet   No No   Sig: Take one twice a week or daily prn 3 lb weight gain in 24 hours  omeprazole (PriLOSEC) 20 mg delayed release capsule  Self No No   Sig: Take 1 capsule (20 mg total) by mouth 2 (two) times a day   Patient not taking: Reported on 4/22/2021   polyethylene glycol (MIRALAX) 17 g packet  Self No No   Sig: Take 17 g by mouth daily as needed (constipation)   Patient not taking: Reported on 5/22/2021   potassium chloride (K-DUR,KLOR-CON) 20 mEq tablet  Self No No   Sig: Take 3 daily   warfarin (COUMADIN) 5 mg tablet  Self No No   Sig: Take 1/2 to 1 tablet by oral route daily  Facility-Administered Medications: None     No Known Allergies    Objective   Vitals: Blood pressure 90/62, pulse (!) 114, temperature 98 °F (36 7 °C), temperature source Oral, resp  rate 18, SpO2 99 %  Invasive Devices     Peripheral Intravenous Line            Peripheral IV 05/26/21 Left;Upper;Ventral (anterior) Arm less than 1 day          Drain            Chest Tube 1 Right 8 Fr  4 days                Physical Exam  Constitutional:       Appearance: Normal appearance  He is obese  HENT:      Head: Normocephalic and atraumatic  Right Ear: External ear normal       Left Ear: External ear normal       Mouth/Throat:      Mouth: Mucous membranes are moist       Pharynx: Oropharynx is clear  Eyes:      Extraocular Movements: Extraocular movements intact  Conjunctiva/sclera: Conjunctivae normal       Pupils: Pupils are equal, round, and reactive to light  Neck:      Musculoskeletal: Normal range of motion  Cardiovascular:      Rate and Rhythm: Normal rate and regular rhythm  Pulses: Normal pulses  Pulmonary:      Effort: Pulmonary effort is normal       Comments: Right chest tube to suction, no air leak, serosanguineous output, normal work of breathing on 3 L via nasal cannula  Abdominal:      General: Abdomen is flat  Palpations: Abdomen is soft  Tenderness: There is no abdominal tenderness  Musculoskeletal: Normal range of motion  Skin:     General: Skin is warm and dry  Neurological:      General: No focal deficit present  Mental Status: He is alert and oriented to person, place, and time  Psychiatric:         Mood and Affect: Mood normal          Behavior: Behavior normal          Lab Results: I have personally reviewed pertinent reports  Imaging Studies: I have personally reviewed pertinent reports  EKG, Pathology, and Other Studies: I have personally reviewed pertinent reports      VTE Prophylaxis: Reason for no pharmacologic prophylaxis Bloody chest tube output    Code Status: Level 1 - Full Code  Advance Directive and Living Will:      Power of :    POLST:

## 2021-05-27 NOTE — PLAN OF CARE
Problem: Potential for Falls  Goal: Patient will remain free of falls  Description: INTERVENTIONS:  - Assess patient frequently for physical needs  -  Identify cognitive and physical deficits and behaviors that affect risk of falls    -  [place] fall precautions as indicated by assessment   - Educate patient/family on patient safety including physical limitations  - Instruct patient to call for assistance with activity based on assessment  - Modify environment to reduce risk of injury  - Consider OT/PT consult to assist with strengthening/mobility  Outcome: Progressing     Problem: PAIN - ADULT  Goal: Verbalizes/displays adequate comfort level or baseline comfort level  Description: Interventions:  - Encourage patient to monitor pain and request assistance  - Assess pain using appropriate pain scale  - Administer analgesics based on type and severity of pain and evaluate response  - Implement non-pharmacological measures as appropriate and evaluate response  - Consider cultural and social influences on pain and pain management  - Notify physician/advanced practitioner if interventions unsuccessful or patient reports new pain  Outcome: Progressing     Problem: INFECTION - ADULT  Goal: Absence or prevention of progression during hospitalization  Description: INTERVENTIONS:  - Assess and monitor for signs and symptoms of infection  - Monitor lab/diagnostic results  - Monitor all insertion sites, i e  indwelling lines, tubes, and drains  - Monitor endotracheal if appropriate and nasal secretions for changes in amount and color  - [place] appropriate cooling/warming therapies per order  - Administer medications as ordered  - Instruct and encourage patient and family to use good hand hygiene technique  - Identify and instruct in appropriate isolation precautions for identified infection/condition  Outcome: Progressing  Goal: Absence of fever/infection during neutropenic period  Description: INTERVENTIONS:  - Monitor WBC    Outcome: Progressing     Problem: SAFETY ADULT  Goal: Patient will remain free of falls  Description: INTERVENTIONS:  - Assess patient frequently for physical needs  -  Identify cognitive and physical deficits and behaviors that affect risk of falls    -  [place] fall precautions as indicated by assessment   - Educate patient/family on patient safety including physical limitations  - Instruct patient to call for assistance with activity based on assessment  - Modify environment to reduce risk of injury  - Consider OT/PT consult to assist with strengthening/mobility  Outcome: Progressing  Goal: Maintain or return to baseline ADL function  Description: INTERVENTIONS:  -  Assess patient's ability to carry out ADLs; assess patient's baseline for ADL function and identify physical deficits which impact ability to perform ADLs (bathing, care of mouth/teeth, toileting, grooming, dressing, etc )  - Assess/evaluate cause of self-care deficits   - Assess range of motion  - Assess patient's mobility; develop plan if impaired  - Assess patient's need for assistive devices and provide as appropriate  - Encourage maximum independence but intervene and supervise when necessary  - Involve family in performance of ADLs  - Assess for home care needs following discharge   - Consider OT consult to assist with ADL evaluation and planning for discharge  - Provide patient education as appropriate  Outcome: Progressing  Goal: Maintain or return mobility status to optimal level  Description: INTERVENTIONS:  - Assess patient's baseline mobility status (ambulation, transfers, stairs, etc )    - Identify cognitive and physical deficits and behaviors that affect mobility  - Identify mobility aids required to assist with transfers and/or ambulation (gait belt, sit-to-stand, lift, walker, cane, etc )  - [place] fall precautions as indicated by assessment  - Record patient progress and toleration of activity level on Mobility SBAR; progress patient to next Phase/Stage  - Instruct patient to call for assistance with activity based on assessment  - Consider rehabilitation consult to assist with strengthening/weightbearing, etc   Outcome: Progressing     Problem: DISCHARGE PLANNING  Goal: Discharge to home or other facility with appropriate resources  Description: INTERVENTIONS:  - Identify barriers to discharge w/patient and caregiver  - Arrange for needed discharge resources and transportation as appropriate  - Identify discharge learning needs (meds, wound care, etc )  - Arrange for interpretive services to assist at discharge as needed  - Refer to Case Management Department for coordinating discharge planning if the patient needs post-hospital services based on physician/advanced practitioner order or complex needs related to functional status, cognitive ability, or social support system  Outcome: Progressing     Problem: Knowledge Deficit  Goal: Patient/family/caregiver demonstrates understanding of disease process, treatment plan, medications, and discharge instructions  Description: Complete learning assessment and assess knowledge base    Interventions:  - Provide teaching at level of understanding  - Provide teaching via preferred learning methods  Outcome: Progressing

## 2021-05-27 NOTE — ASSESSMENT & PLAN NOTE
· Last seen by outpatient GI on 04/08   · [facility]  screening is up-to-date   · Last EGD did not show any esophageal varices

## 2021-05-27 NOTE — OCCUPATIONAL THERAPY NOTE
Occupational Therapy Evaluation     Patient Name: Clint NEUMANN Date: 5/27/2021  Problem List  Principal Problem:    Pleural effusion on right  Active Problems:    Atrial fibrillation (HCC)    Chronic diastolic (congestive) heart failure (HCC)    Type 2 diabetes mellitus with diabetic chronic kidney disease (HCC)    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 ascites (HCC)    CKD (chronic kidney disease) stage 3, GFR 30-59 ml/min (HCC)    Chronic anemia    MARISABEL (acute kidney injury) ([facility] [address] )    Past Medical History  Past Medical History:   Diagnosis Date    A-fib [facility])     Cardiac disease     Chronic combined systolic (congestive) and diastolic (congestive) heart failure (HCC)     Diabetes mellitus ([facility] )     [facility] )     History of echocardiogram 11/24/2014    EF 0 30 (30%), Likely mod LV systolic dysfunction  Likely RV dysfunction as well   History of Lexiscan MPI 02/19/2016    EF 0 43 (43%), no prior MI or ischemia   Hx of echocardiogram 04/28/2017    Normal EF, Normal LV systolic function  Mild concentric LV hypertrophy  Mild mitral and tricuspid regurg      Hx: recurrent pneumonia     Hypertension     Long term (current) use of anticoagulants     Morbid (severe) obesity due to excess calories (HCC)     Neuropathy      Past Surgical History  Past Surgical History:   Procedure Laterality Date    HERNIA REPAIR      IR CHEST TUBE PLACEMENT  5/22/2021    IR THORACENTESIS  8/25/2020    IR THORACENTESIS  2/23/2021    SPLENECTOMY, TOTAL      VASCULAR SURGERY Right     leg           05/27/21 1020   OT Last Visit   OT Visit Date 05/27/21   Note Type   Note type Evaluation   Restrictions/Precautions   Weight Bearing Precautions Per Order No   Other Precautions Multiple lines  (CT)   Pain Assessment   Pain Assessment Tool Pain Assessment not indicated - pt denies pain   Pain Score No Pain   Home Living   Type of [address] [place] [address] Multi-level;Stairs to enter with rails   Bathroom Shower/Tub Tub/shower unit   Bathroom Toilet Standard   Home Equipment Cane   Additional Comments Pt reports living in a 4 story home with 4 [address] and a FFSU if needed  Prior Function   Level of [place] Needs assistance with ADLs and functional mobility   Lives With Spouse   Receives Help From Family   ADL Assistance Needs assistance   IADLs Independent   Falls in the last 6 months 1 to 4  (1)   Vocational Part time employment   Lifestyle   Autonomy Pt reports that his wife helps with LB dressing but that he is otherwise independent with ADLS and IADLS  (+)    Reciprocal Relationships Pt lives with his wife who is able to assist as needed upon d/c   Service to Others Works part time at a [employer] [employer] Enjoys staying home   Subjective   Subjective Pt reports that he has no concerns about returning home  ADL   Where Assessed Chair   Eating Assistance 7  Independent   Grooming Assistance 5  Supervision/Setup   UB Bathing Assistance 5  Supervision/Setup   LB Bathing Assistance 4  Minimal Assistance   UB Dressing Assistance 5  Supervision/Setup   LB [place]  5  Supervision/Setup   Transfers   Sit to Stand 5  Supervision   Additional items Increased time required   Stand to Sit 5  Supervision   Additional items Increased time required   Functional Mobility   Functional Mobility 5  Supervision   Additional Comments Pt demonstrated household mobility with no rest breaks  Additional items Rolling walker   Balance   Static Sitting Good   Dynamic Sitting Fair +   Static Standing Fair   Dynamic Standing Fair -   Ambulatory Fair -   Activity Tolerance   Activity Tolerance Patient tolerated treatment well   Medical Staff Made Aware Seen with PT 2* multiple comorbidities and medical complexity      Nurse Made Aware RN confirmed okay to see pt   RUE Assessment   RUE Assessment WFL   LUE Assessment   LUE Assessment WFL   Cognition Overall Cognitive Status [facility]   Arousal/Participation Alert; Cooperative   Attention Within functional limits   Orientation Level Oriented X4   Memory Decreased recall of precautions   Following Commands Follows one step commands without difficulty   Comments Pt is overall pleasant and cooperative  Assessment   Assessment Pt is a 57 y o  male admitted to [facility] on 5/26/2021 w/ pleural effusion on the right  Pt  has a past medical history of A-fib [facility]), Cardiac disease, Chronic combined systolic (congestive) and diastolic (congestive) heart failure ([address] ), Diabetes mellitus ([facility] ), Dilated cardiomyopathy ([facility] ), History of echocardiogram (11/24/2014), History of Lexiscan MPI (02/19/2016), echocardiogram (04/28/2017), recurrent pneumonia, Hypertension, Long term (current) use of anticoagulants, Morbid (severe) obesity due to excess calories ([facility] ), and Neuropathy  Pt with active OT orders and up and OOB as tolerated orders  Pt resides in a 4 story home with [facility] and a FF if needed  Pt lives with his wife who is able to assist as needed upon d/c  Pt reports that his wife helps with LB dressing but that he is otherwise independent with ADLS and IADLS  (+)   Currently pt is supervision for functional transfers and functional mobility, supervision for UB ADLS and min A for LB ADLS  Based on the aforementioned OT evaluation, functional performance deficits, and assessments, pt has been identified as a moderate complexity evaluation  From OT standpoint, anticipate d/c home with family support  Recommend continued participation in [facility] and functional mobility with staff  No further acute OT needs, d/c OT      Goals   Patient Goals To return home   Recommendation   OT Discharge Recommendation No rehabilitation needs  (Home with increased social support)   OT - OK to Discharge Yes  (When medically appropriate)   AM-PAC Daily Activity Inpatient   Lower Body Dressing 3   Bathing 3   Toileting 3   Upper Body Dressing 4 Grooming 4   Eating 4   Daily Activity Raw Score 21   Daily Activity Standardized Score (Calc for Raw Score >=11) 44 27   AM-PAC Applied Cognition Inpatient   Following a Speech/Presentation 4   Understanding Ordinary Conversation 4   Taking Medications 4   Remembering Where Things Are Placed or Put Away 4   Remembering List of 4-5 Errands 4   Taking Care of Complicated Tasks 4   Applied Cognition Raw Score 24   Applied Cognition Standardized Score 62 21   Modified [place] Scale   Modified Ortiz Scale 3       Kate Aguillon, LOGAN, OTR/L

## 2021-05-27 NOTE — QUICK NOTE
Received signout from Dr Frankie Hdz around 4 PM on 5/27/21 regarding transitioning care of pt from SLIM to SOD service beginning 5/28/21 at 7:30AM  Until then, pt will remain on SLIM covering service

## 2021-05-27 NOTE — CONSULTS
Follow up Consultation    Nephrology   Carry Verna 57 y o  male MRN: [id number]  Unit/Bed#: [address]-01 Encounter: [id number]      Physician Requesting Consult: Sailaja Tobias MD        ASSESSMENT/PLAN:  67-year-old male with multiple comorbidities including morbid obesity, CHF, AFib and hypertension transferred in from the [facility] for thoracic surgery evaluation   Acute kidney injury (POA) on CKD stage 3:  - MARISABEL most likely secondary to failure to auto regulate in presence of hemodynamic perturbations with hypotension plus anemia plus cardiorenal syndrome plus Ace inhibitor use with subsequent ATN  - After review of records In [facility] as well as Care everywhere it appears that the patient has a baseline Creatinine of at 1 5-1 8 mg/dL  - patient was admitted with a creatinine of 2 80 mg/dL on 05/21/2021   - patient's creatinine today is at 1 86 mg/dL, stable improving   - okay to continue diuretics for now as long as blood pressures are improved  - check BMP in a m   - Await renal recovery  - Optimize hemodynamic status to avoid delay in renal recovery    - Place on a renal diet when allowed diet order    - Avoid nephrotoxins, adjust meds to appropriate GFR   - Strict I/O   - Daily weights  - Urinary retention protocol if patient does not have a Gann  - Most likely has underlying CKD secondary to underlying liver disease plus age-related nephron loss plus obesity related hyper filtration  - will need to set up patient for follow up with Nephrology as an outpatient post hospitalization   - for nephrology as an outpatient patient follows up with Dr Kevin Antonio Blood pressure:  - current medications:  Coreg 6 25 mg p o  B i d , Lasix 80 mg p o  B i d   - recommendations:  Recommend midodrine 5 mg p o  T i d  Hold for SBP greater than 110 and see if okay with Cardiology to decrease Coreg to 3 125 mg p o  B i d   - Optimize hemodynamics   - Maintain MAP > 65mmHg  - Avoid BP fluctuations   H/H/anemia:  - most recent hemoglobin at 7 1 grams/deciliter  - maintain hemoglobin greater than 8 grams/deciliter  - management primary team consider PRBC transfusion     Acid-base electrolytes:  o Hyponatremia:    - Most recent sodium at 133 mEq  - If sodium continues to drop will consider dose of tolvaptan 7 5 mg p o  X1  - Remains on nicotine    o Hypermagnesemia:   - Most recent magnesium at 2 7 continue to monitor    o  Acid-base:    - Most recent bicarb at 33     Volume status:  o  Clinically appears to be minimally hypervolemic  Okay to resume diuretics for now as long as blood pressures improved     Proteinuria:   o Most recent UA with no protein as of May 2021     Other medical problems:  o Diabetes:  Management per primary team  Most recent A1c 5 5% as of May 2021  o Liver cirrhosis:  Management primary team  o CHF:  Management per primary team   At home is on Lasix 80 mg p o  B i d  And metolazone 5 mg p o  Twice a week  Follow-up with cardiology  o Right pleural effusion:  Follow-up with CT surgery and Pulmonary for possible VATS  Follow-up with IR for chest tube change  Thanks for the consult  Will continue to follow  Please call with questions/ concerns  Above-mentioned orders and Plan in terms of acute kidney injury was discussed with the team in [address] MD, FASN, 2021, 11:20 AM              Objective :   Patient seen and examined in his room no overnight events hemodynamically stable check blood pressures continue to remain low urine output not adequately documented  Patient [facility] in need for higher level of care with Cardiothoracic surgery for his pleural effusions        PHYSICAL EXAM  /53   Pulse (!) 111   Temp 98 6 °F (37 °C) (Oral)   Resp 18   Wt 132 kg (291 lb 7 2 oz)   SpO2 98%   BMI 40 65 kg/m²   Temp (24hrs), Av °F (36 7 °C), Min:97 3 °F (36 3 °C), Max:98 6 °F (37 °C)        Intake/Output Summary (Last 24 hours) at 5/27/2021 1120  Last data filed at 5/27/2021 1001  Gross per 24 hour   Intake 485 ml   Output 975 ml   Net -490 ml       I/O last 24 hours: In: 1 [P O :485]  Out: 975 [Urine:975]      Current Weight: Weight - Scale: 132 kg (291 lb 7 2 oz)  First Weight: Weight - Scale: 132 kg (291 lb 7 2 oz)  Physical Exam  Vitals signs and nursing note reviewed  Constitutional:       General: He is not in acute distress  Appearance: Normal appearance  He is normal weight  He is not ill-appearing, toxic-appearing or diaphoretic  HENT:      Head: Normocephalic and atraumatic  Mouth/Throat:      Mouth: Mucous membranes are moist       Pharynx: Oropharynx is clear  No oropharyngeal exudate  Eyes:      General: No scleral icterus  Conjunctiva/sclera: Conjunctivae normal    Neck:      Musculoskeletal: Normal range of motion and neck supple  Cardiovascular:      Rate and Rhythm: Normal rate  Heart sounds: Normal heart sounds  No friction rub  Pulmonary:      Effort: Pulmonary effort is normal  No respiratory distress  Breath sounds: No stridor  No wheezing  Comments: Decreased breath sounds no wheezing right chest tube  Abdominal:      General: There is no distension  Palpations: Abdomen is soft  There is no mass  Tenderness: There is no abdominal tenderness  Musculoskeletal:         General: Swelling present  Comments: Plus one edema bilateral lower extremities   Skin:     General: Skin is warm  Coloration: Skin is not jaundiced  Neurological:      General: No focal deficit present  Mental Status: He is alert and oriented to person, place, and time  Psychiatric:         Mood and Affect: Mood normal          Behavior: Behavior normal              Review of Systems   Constitutional: Negative for chills, fatigue and fever  HENT: Negative for congestion  Respiratory: Negative for cough, shortness of breath and wheezing      Cardiovascular: Positive for leg swelling  Gastrointestinal: Negative for abdominal pain, constipation, diarrhea, nausea and vomiting  Genitourinary: Negative for hematuria  Musculoskeletal: Negative for back pain  Skin: Negative for rash  Neurological: Negative for dizziness and headaches  Psychiatric/Behavioral: Negative for agitation and confusion  All other systems reviewed and are negative  Scheduled Meds:  Current Facility-Administered Medications   Medication Dose Route Frequency Provider Last Rate    acetaminophen  650 mg Oral Q6H PRN Rah Ortiz MD      bisacodyl  5 mg Oral Daily PRN Tish Dawson PA-C      calcium carbonate  1,000 mg Oral Daily PRN Rah Ortiz MD      carvedilol  6 25 mg Oral BID With Meals David Tim PA-C      [START ON 5/29/2021] ergocalciferol  50,000 Units Oral Weekly Rah Ortiz MD      furosemide  80 mg Oral BID (diuretic) Rah Ortiz MD      HYDROmorphone  0 2 mg Intravenous Q6H PRN Rah Ortiz MD      metoprolol  2 5 mg Intravenous Q6H PRN David Tim PA-C      nicotine  1 patch Transdermal Daily Roldan Gates MD      ondansetron  4 mg Intravenous Q6H PRN Rah Ortiz MD      oxyCODONE-acetaminophen  1 tablet Oral Q6H PRN Rah Ortiz MD      polyethylene glycol  17 g Oral Daily PRN Rah Ortiz MD         PRN Meds:   acetaminophen    bisacodyl    calcium carbonate    HYDROmorphone    metoprolol    ondansetron    oxyCODONE-acetaminophen    polyethylene glycol    Continuous Infusions:       Invasive Devices:      Invasive Devices     Peripheral Intravenous Line            Peripheral IV 05/26/21 Left;Upper;Ventral (anterior) Arm 1 day          Drain            Chest Tube 1 Right 8 Fr  4 days                  LABORATORY:    Results from last 7 days   Lab Units 05/27/21  0155 05/26/21  1055 05/26/21  0429 05/25/21  1808 05/25/21  0520 05/24/21  0728 05/23/21  1231 05/23/21  0620  05/22/21  0850 05/21/21  0931   WBC Thousand/uL 6 38  --  6 00 --  5 01 4 39  --  4 30*  --  3 74* 4 18*   HEMOGLOBIN g/dL 7 1* 7 5* 7 6* 7 1* 6 8* 7 7* 7 4* 7 1*   < > 7 6* 7 4*   HEMATOCRIT % 22 7* 23 5* 24 6*  --  21 7* 25 0* 22 8* 22 6*   < > 23 4* 23 9*   PLATELETS Thousands/uL 164  --  191  --  166 173  --  149  --  162 170   POTASSIUM mmol/L 4 6  --  4 8  --  4 9 4 3  --  4 4  --  4 0 4 2   CHLORIDE mmol/L 97*  --  96*  --  97* 97*  --  96*  --  94* 96*   CO2 mmol/L 33*  --  28  --  30 30  --  29  --  30 30   BUN mg/dL 79*  --  85*  --  85* 80*  --  82*  --  88* 93*   CREATININE mg/dL 1 86*  --  2 08*  --  2 31* 2 36*  --  2 43*  --  2 72* 2 80*   CALCIUM mg/dL 9 1  --  9 1  --  9 2 9 4  --  8 8  --  8 3 8 6   MAGNESIUM mg/dL 2 7*  --   --   --   --   --   --   --   --   --  2 3   PHOSPHORUS mg/dL  --   --   --   --   --   --   --   --   --   --  5 2*    < > = values in this interval not displayed  rest all reviewed    RADIOLOGY:  IR chest tube check/change/reposition/upsize    (Results Pending)     Rest all reviewed    Portions of the record may have been created with voice recognition software  Occasional wrong word or "sound a like" substitutions may have occurred due to the inherent limitations of voice recognition software  Read the chart carefully and recognize, using context, where substitutions have occurred  If you have any questions, please contact the dictating provider

## 2021-05-27 NOTE — ASSESSMENT & PLAN NOTE
Wt Readings from Last 3 Encounters:   05/27/21 132 kg (291 lb 7 2 oz)   05/26/21 131 kg (288 lb 12 8 oz)   05/22/21 (!) 136 kg (300 lb 0 7 oz)     · Patient euvolemic on exam  · Followed by Dr Anna Sanderson OP  · Home regimen: lasix 80 mg BID, metolazone 5 mg twice a week  · Continue Lasix 80 milligrams b i d -patient has been unable to receive this given low blood pressure  · Weight has remained stable   · Continue intake and output and daily weights

## 2021-05-27 NOTE — TELEMEDICINE
INTERPROFESSIONAL (PHONE) CONSULTATION - Interventional Radiology  Troy Ko 57 y o  male MRN: [id number]  Unit/Bed#: [facility] 422-01 Encounter: [id number]    IR has been consulted to evaluate the patient, determine the appropriate procedure, and whether or not a procedure can and should be performed regarding the care of Troy Ko  We were consulted by thoracic surgery concerning persistent right pleural effusion, and to possibly perform a right chest tube upsize if medically appropriate for the patient  IP Consult to IR  Consult performed by: Karen Farah PA-C  Consult ordered by: Jessica Lawton PA-C        05/27/21      Assessment/Recommendation:     57year old male with right sided pleural effusion, s/p IR 8F right chest tube placement 5/22  Repeat CT chest with persistent right pleural effusion    - will plan for chest tube upsize today  - last INR 1 44 5/24  - please keep npo    Total time spent in review of data, discussion with requesting provider and rendering advice was 25 minutes       Patient or appropriate family member was verbally informed by thoracic surgery of this consultative service on their behalf to provide more timely access to specialty care in lieu of an in person consultation  Verbal consent was obtained  Thank you for allowing Interventional Radiology to participate in the care of Troy Ko  Please don't hesitate to call or TigerText us with any questions       Karen Farah PA-C

## 2021-05-27 NOTE — PHYSICAL THERAPY NOTE
Physical Therapy Evaluation    Patient's Name: Janie Remy    Admitting Diagnosis  Pleural effusion [J90]    Problem List  Patient Active Problem List   Diagnosis    Atrial fibrillation ([facility] )    Chronic diastolic (congestive) heart failure (HCC)    Type 2 diabetes mellitus with diabetic chronic kidney disease ([place] [address] )    Class 3 severe obesity due to excess calories with serious comorbidity and body mass index (BMI) of 45 0 to 49 9 in adult [facility])    Lymphedema    Chronic venous insufficiency    Varicose veins of both lower extremities with inflammation    Hypertensive heart and chronic kidney disease with heart failure and stage 1 through stage 4 chronic kidney disease, or chronic kidney disease (HCC)    STEFF (obstructive sleep apnea)    Pleural effusion on right    SOB (shortness of breath)    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 ascites (HCC)    CKD (chronic kidney disease) stage 3, GFR 30-59 ml/min (HCC)    Hyperkalemia    Tobacco abuse    Chronic anemia    Thrombocytopenia (HCC)    Hypokalemia    Other cirrhosis of liver ([place] [address] )    Encounter for screening for other viral diseases     Lactic acidosis    Permanent atrial fibrillation ([place] [address] )    Diverticulosis of colon    Lesion of spleen    Type 2 diabetes mellitus with hyperglycemia, without long-term current use of insulin (HCC)    Acute on chronic diastolic CHF (congestive heart failure) ([facility] )    Tobacco use    Constipation    Pulmonary hypertension (HCC)    Vitamin D deficiency    Atelectasis    Anemia    Hypercalcemia    MARISABEL (acute kidney injury) ([facility] )    Acute renal failure superimposed on stage 3b chronic kidney disease ([facility] )    Hypertensive nephrosclerosis    Chronic kidney disease-mineral and bone disorder       Past Medical History  Past Medical History:   Diagnosis Date    A-fib [facility])     Cardiac disease     Chronic combined systolic (congestive) and diastolic (congestive) heart failure ([facility] )     Diabetes mellitus ([facility] )     Dilated cardiomyopathy ([facility] )     History of echocardiogram 11/24/2014    EF 0 30 (30%), Likely mod LV systolic dysfunction  Likely RV dysfunction as well   History of Lexiscan MPI 02/19/2016    EF 0 43 (43%), no prior MI or ischemia   Hx of echocardiogram 04/28/2017    Normal EF, Normal LV systolic function  Mild concentric LV hypertrophy  Mild mitral and tricuspid regurg   Hx: recurrent pneumonia     Hypertension     Long term (current) use of anticoagulants     Morbid (severe) obesity due to excess calories (HCC)     Neuropathy        Past Surgical History  Past Surgical History:   Procedure Laterality Date    HERNIA REPAIR      IR CHEST TUBE PLACEMENT  5/22/2021    IR THORACENTESIS  8/25/2020    IR THORACENTESIS  2/23/2021    SPLENECTOMY, TOTAL      VASCULAR SURGERY Right     leg        05/27/21 1021   PT Last Visit   PT Visit Date 05/27/21   Note Type   Note type Evaluation   Pain Assessment   Pain Assessment Tool Pain Assessment not indicated - pt denies pain   Home Living   Type of Home House   Home Layout Multi-level; Able to live on main level with bedroom/bathroom;Stairs to enter with rails   [address]   Additional Comments Pt reports living in a [address] with [address]  Can stay on first floor if needed, but main bed/bath are on second floor  Was ambulating I w/o AD PTA  Prior Function   Level of [place] Independent with ADLs and functional mobility   Lives With Spouse   Receives Help From Family   ADL Assistance Independent   IADLs Independent   Falls in the last 6 months 1 to 4  (1 per pt)   Vocational Part time employment   Restrictions/Precautions   Weight Bearing Precautions Per Order No   Other Precautions Multiple lines;Telemetry; Fall Risk  (CT)   Cognition   Overall Cognitive Status WFL   Attention Within functional limits   Orientation Level Oriented X4   Memory Decreased recall of precautions   Following Commands Follows one step commands without difficulty   Comments pt agreeable to work with therapy   RLE Assessment   RLE Assessment WFL   LLE Assessment   LLE Assessment WFL   Bed Mobility   Additional Comments Pt seated OOB upon arrival, not assessed   Transfers   Sit to Stand 5  Supervision   Additional items Verbal cues   Stand to Sit 5  Supervision   Additional items Verbal cues   Additional Comments Transfers with RW  VCs for proper hand placement  Ambulation/Elevation   Gait pattern Excessively slow   Gait Assistance 5  Supervision   Additional items Assist x 1   Assistive Device Rolling walker   Distance 120ft    Balance   Static Sitting Good   Dynamic Sitting Fair +   Static Standing Fair   Dynamic Standing Fair -   Ambulatory Fair -   Activity Tolerance   Activity Tolerance Patient tolerated treatment well   Medical Staff Made Aware Co-eval with OT 2* pt's medical complexity and multiple comorbidities   Nurse Made Aware ok to see per RN   Assessment   Prognosis Good   Problem List Decreased strength;Decreased endurance;Decreased mobility; Impaired balance   Assessment Pt is a 57 y o  male seen for PT evaluation s/p admit to [facility] on 5/26/2021  Pt was admitted with a primary dx of: pleural effusion on R  Pt is s/p R chest tube placement on 5/22  PT now consulted for assessment of mobility and d/c needs  Pt with Up and OOB as tolerated , PT evaluation orders  Pts current comorbidities effecting treatment include: A-fib, cardiac disease, CHF, DM, dilated cardiomyopathy, recurrent pneumonia, HTN, morbid obesity, neuropathy  Pts current clinical presentation is Unstable/ Unpredictable (high complexity) due to Ongoing medical management for primary dx, Increased reliance on more restrictive AD compared to baseline, Decreased activity tolerance compared to baseline, Fall risk, Increased assistance needed from caregiver at current time, Ongoing telemetry monitoring, s/p surgical intervention  Prior to admission, pt was I with ADLs and ambulating w/o AD   Pt lives with his wife in a [address] with [address], bed/bath on second floor  Upon evaluation, pt currently is requiring supervision for transfers and supervision for ambulation 120 ft w/ RW  Pt presents at PT eval functioning below baseline and currently w/ overall mobility deficits 2* to: BLE weakness, impaired balance, decreased endurance, gait deviations, decreased activity tolerance compared to baseline, decreased functional mobility tolerance compared to baseline, fall risk, SOB upon exertion  Pt currently at a fall risk 2* to impairments listed above  Pt will continue to benefit from skilled acute PT interventions to address stated impairments; to maximize functional mobility; for ongoing pt/ family training; and DME needs  At conclusion of PT session pt returned back in chair with phone and call bell within reach  Pt denies any further questions at this time  Recommend home with social support upon hospital D/C  Goals   Patient Goals to go home   STG Expiration Date 06/06/21   Short Term Goal #1 In 10 days pt will be able to: 1  Demonstrate ability to perform all aspects of bed mobility independently to increase functional independence  2  Perform functional transfers independently to facilitate safe return to previous living environment  3   Ambulate 300 ft with LRAD vs  No AD at mod I level with stable vitals to improve safety with household distances and reduce fall risk  4  Climb 4+12 steps with HR independently to simulate entrance to home  5  Improve LE strength grades by 1 to increase ease of functional mobility with transfers and gait  6  Pt will demonstrate improved balance by one grade order to decrease risk of falls  PT Treatment Day 0   Plan   Treatment/Interventions Functional transfer training;LE strengthening/ROM; Elevations; Therapeutic exercise; Endurance training;Bed mobility;Gait training;Spoke to nursing;Spoke to case management;OT   PT Frequency Other (Comment)  (3-5x/wk)   Recommendation PT Discharge Recommendation No rehabilitation needs   Additional Comments pt would benefit from follow up session to assess ambulation w/o AD + stair trial   AM-PAC Basic Mobility Inpatient   Turning in Bed Without Bedrails 4   Lying on Back to Sitting on Edge of Flat Bed 3   Moving Bed to Chair 3   Standing Up From Chair 3   Walk in Room 3   Climb 3-5 Stairs 3   Basic Mobility Inpatient Raw Score 19   Basic Mobility Standardized Score 42 48   Modified Travis Scale   Modified Ortiz Scale 3       Perlita Martinez, PT, DPT

## 2021-05-27 NOTE — UTILIZATION REVIEW
Initial Clinical Review    Admission: Date/Time/Statement:   Admission Orders (From admission, onward)     Ordered        05/26/21 2132  Inpatient Admission  Once                   Orders Placed This Encounter   Procedures    Inpatient Admission     Standing Status:   Standing     Number of Occurrences:   1     Order Specific Question:   Level of Care     Answer:   Med Surg [16]     Order Specific Question:   Estimated length of stay     Answer:   More than 2 Midnights     Order Specific Question:   Certification     Answer:   I certify that inpatient services are medically necessary for this patient for a duration of greater than two midnights  See H&P and MD Progress Notes for additional information about the patient's course of treatment  Initial Presentation: 57year old male, presented to the ED @ [address] [place] [address], Admitted, Transferred to [address] [place] [address], [facility] level [facility], via EMS  Admitted as Inpatient due to persistent pleural effusion on the right  Recurrent right-sided pleural effusion status post thoracentesis and chest tube placement on 05/22/2021  Patient continue to have bloody drainage from chest tube, that caused acute anemia requiring 1 unit PRBC transfusion  Patient had repeat chest CT today that shows smaller size of effusion however suggestive of new hematoma in pleural space  Therefore pulmonology recommended patient to be transferred for thoracic surgery evaluation  PMH of HTN, DM, CHF, Afib, neuropathy, and cirrhosis  Date: 05/26/2021    Maintain chest tube, continue to monitor bloody output  CT scan performed on 05/26 revealed pleural effusion improvement with new hyperdensities consistent with lung hematomas  Consult thoracic surgery for evaluation for VATs  VTE Prophylaxis: Pharmacologic VTE Prophylaxis contraindicated due to Continued bloody drainage from chest tube, acute anemia  / sequential compression device   Day 2: 05/27/2021    Plan for chest tube upsize today, last inr 1 44 on 5/24  Keep NPO     05/27/2021  Consult Thoracic Surgery  Recurrent/persistent R sided pleural effusion  Exudative on body fluid analysis  O2 @ 3L via NC  Maintain right chest tube to water seal, - suction, - air leak  Consider tPA/Dornase into R CT today 5/27, if no resolution can consider VATS  Triage Vitals   Temperature Pulse Respirations Blood Pressure SpO2   05/27/21 0238 05/27/21 0000 05/27/21 0000 05/27/21 0000 05/26/21 2100   98 °F (36 7 °C) (!) 128 17 118/70 100 %      Temp Source Heart Rate Source Patient Position - Orthostatic VS BP Location FiO2 (%)   05/27/21 0238 05/27/21 0000 05/27/21 0000 05/27/21 0000 --   Oral Monitor Sitting Right arm       Pain Score       05/26/21 2100       8          Wt Readings from Last 1 Encounters:   05/27/21 132 kg (291 lb 7 2 oz)     Additional Vital Signs:   Date/Time  Temp  Pulse  Resp  BP  MAP (mmHg)  SpO2  O2 Device  Patient Position - Orthostatic VS   05/27/21 13:56:40  --  121Abnormal   18  --  --  97 %  --  --   05/27/21 13:00:01  --  119Abnormal   --  --  --  96 %   --  --   SpO2: ra at 05/27/21 1300   05/27/21 1200  --  --  --  --  --  98 %  None (Room air)  --   05/27/21 1100  --  111Abnormal   --  100/53  73  --  None (Room air)  --   05/27/21 1055  --  --  --  --  --  --  None (Room air)  --   05/27/21 1050  98 6 °F (37 °C)  100  18  110/55  --  98 %  None (Room air)  --   05/27/21 1043  --  --  --  97/54  --  --  --  --   05/27/21 1032  98 2 °F (36 8 °C)  110Abnormal   18  --  --  --  --  --   05/27/21 0930  --  118Abnormal   --  92/52  --  98 %  None (Room air)  Sitting   05/27/21 0800  --  --  --  --  --  --  None (Room air)  --   05/27/21 0238  98 °F (36 7 °C)  114Abnormal   18  90/62  71  99 %  --  Sitting     05/26/2021 @ 1116  CT chest X:  Improvement in the prior pleural effusion with new hyperdensities consistent with hematomas with pigtail catheter in place  Atelectasis of the middle lobe and right lower lobe unchanged  Pertinent Labs/Diagnostic Test Results:     Results from last 7 days   Lab Units 05/27/21  0155 05/26/21  1055 05/26/21  0429 05/25/21  1808 05/25/21  0520 05/24/21  0728  05/23/21  0620  05/22/21  0850   WBC Thousand/uL 6 38  --  6 00  --  5 01 4 39  --  4 30*  --  3 74*   HEMOGLOBIN g/dL 7 1* 7 5* 7 6* 7 1* 6 8* 7 7*   < > 7 1*   < > 7 6*   HEMATOCRIT % 22 7* 23 5* 24 6*  --  21 7* 25 0*   < > 22 6*   < > 23 4*   PLATELETS Thousands/uL 164  --  191  --  166 173  --  149  --  162   NEUTROS ABS Thousands/µL  --   --  4 46  --   --   --   --  3 21  --  2 46    < > = values in this interval not displayed  Results from last 7 days   Lab Units 05/27/21  0155 05/26/21  0429 05/25/21  0520 05/24/21  0728 05/23/21  0620  05/21/21  0931   SODIUM mmol/L 133* 135* 135* 137 135*   < > 131*   POTASSIUM mmol/L 4 6 4 8 4 9 4 3 4 4   < > 4 2   CHLORIDE mmol/L 97* 96* 97* 97* 96*   < > 96*   CO2 mmol/L 33* 28 30 30 29   < > 30   ANION GAP mmol/L 3* 11 8 10 10   < > 5   BUN mg/dL 79* 85* 85* 80* 82*   < > 93*   CREATININE mg/dL 1 86* 2 08* 2 31* 2 36* 2 43*   < > 2 80*   EGFR ml/min/1 73sq m 38 33 29 28 27   < > 23   CALCIUM mg/dL 9 1 9 1 9 2 9 4 8 8   < > 8 6   CALCIUM, IONIZED mmol/L  --   --   --   --   --   --  1 09*   MAGNESIUM mg/dL 2 7*  --   --   --   --   --  2 3   PHOSPHORUS mg/dL  --   --   --   --   --   --  5 2*    < > = values in this interval not displayed       Results from last 7 days   Lab Units 05/22/21  0850 05/21/21  0931   AST U/L 20 20   ALT U/L 17 16   ALK PHOS U/L 133* 130*   TOTAL PROTEIN g/dL 7 8 7 9   ALBUMIN g/dL 3 0* 3 0*   TOTAL BILIRUBIN mg/dL 0 46 0 67     Results from last 7 days   Lab Units 05/27/21  0155 05/26/21  0429 05/25/21  0520 05/24/21  0728 05/23/21  0620 05/22/21  0850   GLUCOSE RANDOM mg/dL 142* 110 100 108 119 116     Results from last 7 days   Lab Units 05/21/21  0931   HEMOGLOBIN A1C % 5 5   EAG mg/dl 111     Results from last 7 days   Lab Units 05/22/21  0919   PH ALISIA  7 412* PCO2 ALISIA mm Hg 41 9*   PO2 ALISIA mm Hg 64 4*   HCO3 ALISIA mmol/L 26 1   BASE EXC ALISIA mmol/L 1 3   O2 CONTENT ALISIA ml/dL 10 2   O2 HGB, VENOUS % 86 6*     Results from last 7 days   Lab Units 05/22/21  0850   TROPONIN I ng/mL <0 02     Results from last 7 days   Lab Units 05/27/21  1209 05/24/21  1159 05/23/21  0620   PROTIME seconds 16 8* 17 2* 22 8*   INR  1 36* 1 44* 2 07*     Results from last 7 days   Lab Units 05/22/21  0850   LACTIC ACID mmol/L 1 2     Results from last 7 days   Lab Units 05/22/21  0850   NT-PRO BNP pg/mL 981 0*     Results from last 7 days   Lab Units 05/23/21  0307   CLARITY UA  Clear   COLOR UA  Light Yellow   SPEC GRAV UA  1 015   PH UA  5 5   GLUCOSE UA mg/dl Negative   KETONES UA mg/dl Negative   BLOOD UA  Negative   PROTEIN UA mg/dl Negative   NITRITE UA  Negative   BILIRUBIN UA  Negative   UROBILINOGEN UA E U /dl 0 2   LEUKOCYTES UA  Negative   WBC UA /hpf 0-1*   RBC UA /hpf None Seen   BACTERIA UA /hpf Occasional   EPITHELIAL CELLS WET PREP /hpf Occasional   SODIUM UR  30   CREATININE UR mg/dL 79 0     Results from last 7 days   Lab Units 05/22/21  1738   GRAM STAIN RESULT  Rare Polys  No organisms seen   BODY FLUID CULTURE, STERILE  No growth     Results from last 7 days   Lab Units 05/22/21  1738   TOTAL COUNTED  100   WBC FLUID /ul 931     Past Medical History:   Diagnosis Date    [facility]-fib [facility])     Cardiac disease     Chronic combined systolic (congestive) and diastolic (congestive) heart failure (HCC)     Diabetes mellitus (HCC)     [facility]ilated [facility] ([facility] )     History of echocardiogram 11/24/2014    EF 0 30 (30%), Likely mod LV systolic dysfunction  Likely RV dysfunction as well   History of Lexiscan MPI 02/19/2016    EF 0 43 (43%), no prior MI or ischemia   Hx of echocardiogram 04/28/2017    Normal EF, Normal LV systolic function  Mild concentric LV hypertrophy  Mild mitral and tricuspid regurg      Hx: recurrent pneumonia     Hypertension     Long term (current) use of anticoagulants     Morbid (severe) obesity due to excess calories (HCC)     Neuropathy      Present on Admission:   Pleural effusion on right   Atrial fibrillation (HCC)   Chronic diastolic (congestive) heart failure (HCC)   Type 2 diabetes mellitus with diabetic chronic kidney disease (HCC)   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 ascites (HCC)   CKD (chronic kidney disease) stage 3, GFR 30-59 ml/min (HCC)   MARISABEL (acute kidney injury) ([facility] )   Chronic anemia      Admitting Diagnosis: Pleural effusion [J90]  Age/Sex: 57 y o  male  Admission Orders:  Scheduled Medications:  carvedilol, 6 25 mg, Oral, BID With Meals  [START ON 5/29/2021] ergocalciferol, 50,000 Units, Oral, Weekly  furosemide, 80 mg, Oral, BID (diuretic)  midodrine, 5 mg, Oral, TID AC  nicotine, 1 patch, Transdermal, Daily      Continuous IV Infusions:     PRN Meds:  acetaminophen, 650 mg, Oral, Q6H PRN  bisacodyl, 5 mg, Oral, Daily PRN  calcium carbonate, 1,000 mg, Oral, Daily PRN  fentanyl citrate (PF), , Intravenous, Code/Trauma/Sedation Med  HYDROmorphone, 0 2 mg, Intravenous, Q6H PRN  metoprolol, 2 5 mg, Intravenous, Q6H PRN  ondansetron, 4 mg, Intravenous, Q6H PRN  oxyCODONE-acetaminophen, 1 tablet, Oral, Q6H PRN  polyethylene glycol, 17 g, Oral, Daily PRN      NPO  Daily weights / I&O  Consult PT/OT  [place]ris SCDs  Chest tube to water seal  IP CONSULT TO THORACIC SURGERY  IP CONSULT TO NEPHROLOGY  INPATIENT CONSULT TO IR    Network Utilization Review Department  ATTENTION: Please call with any questions or concerns to [phone] and carefully listen to the prompts so that you are directed to the right person  All voicemails are confidential   Cata Beebe all requests for admission clinical reviews, approved or denied determinations and any other requests to dedicated fax number below belonging to the campus where the patient is receiving treatment   List of dedicated fax numbers for the Facilities:  FACILITY NAME UR FAX NUMBER   ADMISSION DENIALS (Administrative/Medical Necessity) [phone]   [address] (Maternity/NICU/Pediatrics) 261 [facility],[address]Th Floor [facility] [address] [place] [address]  [phone]   Gaby Watson [id number] [place] [address] [address] [facility] [phone]

## 2021-05-27 NOTE — PROGRESS NOTES
[address] [facility]  Progress Note - Jacquelyn Pal 1960, 57 y o  male MRN: [id number]  Unit/Bed#: [facility] 422-01 Encounter: [id number]  Primary Care Provider: Becky Miranda DO   Date and time admitted to hospital: 5/26/2021  9:30 PM    * Pleural effusion on right  Assessment & Plan  · Patient transferred to [place] from [facility] for thoracic surgery evaluation  · History of recurrent right-sided pleural effusion s/p thoracentesis on 05/22, chest tube placed with continued output of bloody fluid  · Fluid-exudative but negative for malignancy, cultures negative  · Repeat chest x-ray on 05/24 with continued effusion  · CT scan performed on 05/26 revealed pleural effusion improvement with new hyperdensities consistent with lung hematomas  · Consulted thoracic surgery for evaluation  · To go to IR today to upsize the tube  · Cont monitoring the output  · IS  · Pain control    MARISABEL (acute kidney injury) ([facility] )  Assessment & Plan  · Creatinine remains stable    · Requested nephro to continue to follow  · Continue Lasix 80 mg b i d  With hold parameters  · Continue holding ACE-inhibitor  · Stable hypotension  · Daily weights with intake and output    Chronic anemia  Assessment & Plan  · With baseline hemoglobin approximately 9 5   · Now acute on chronic d/t blood loss  · Patient received 1 unit of PRBC on 05/25/2021 due to bloody chest tube drainage and hematoma  · Stool occult neg  · Hb 6 8 -> 1  U PRBc -> 7 6, now 7 1  · Give 1 U PRBC today  · Continue to monitor H&H    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 ascites ([facility] )  Assessment & Plan  · Last seen by outpatient GI on 04/08   · [facility]  screening is up-to-date   · Last EGD did not show any esophageal varices       Type 2 diabetes mellitus with diabetic chronic kidney disease ([facility] )  Assessment & Plan  Lab Results   Component Value Date    HGBA1C 5 5 05/21/2021       No results for input(s): POCGLU in the last 72 hours      Blood Sugar Average: Last 72 hrs:  · Not on any home meds      Chronic diastolic (congestive) heart failure (HCC)  Assessment & Plan  Wt Readings from Last 3 Encounters:   05/27/21 132 kg (291 lb 7 2 oz)   05/26/21 131 kg (288 lb 12 8 oz)   05/22/21 (!) 136 kg (300 lb 0 7 oz)     · Patient euvolemic on exam  · Followed by Dr Twan Stacy OP  · Home regimen: lasix 80 mg BID, metolazone 5 mg twice a week  · Continue Lasix 80 milligrams b i d -patient has been unable to receive this given low blood pressure  · Weight has remained stable   · Continue intake and output and daily weights          Atrial fibrillation (HCC)  Assessment & Plan  · HR elevated due to resp status  · on coreg with BP on low side  · Chronic anticoagulation with Coumadin 5 milligrams Sunday, Tuesday, Wednesday, Friday, Saturday and 2 5 milligrams Monday and Thursday  · Coumadin currently on hold given anemia and bloody output from chest tube    Hypotension:  · From afib + meds  · Monitor on coreg - needs for afib  · Midodrine added by nephro  · PRBC today  [facility] Internal Medicine Progress Note  Patient: Miguelito Gomez 57 y o  male   MRN: [id number]  PCP: Yasmin Yoo DO  Unit/Bed#: [facility] 422-01 Encounter: [id number]  Date Of Visit: 05/27/21    Assessment:    Principal Problem:    Pleural effusion on right  Active Problems:    Atrial fibrillation (HCC)    Chronic diastolic (congestive) heart failure (HCC)    Type 2 diabetes mellitus with diabetic chronic kidney disease ([facility] )    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 ascites (HCC)    CKD (chronic kidney disease) stage 3, GFR 30-59 ml/min (HCC)    Chronic anemia    MARISABEL (acute kidney injury) ([facility] )      Plan:         VTE Pharmacologic Prophylaxis:     Moderate Risk (Score 3-4) - Pharmacological DVT Prophylaxis Contraindicated  Sequential Compression Devices Ordered    Pharmacologic agent: Pharmacologic VTE Prophylaxis contraindicated due to anemia  Mechanical VTE Prophylaxis in Place: Yes    Patient Centered Rounds: I have performed bedside rounds with nursing staff today  Discussions with Specialists or Other Care Team Provider:     Education and Discussions with Family / Patient: patient    Time Spent for Care: 30 minutes  More than 50% of total time spent on counseling and coordination of care as described above  Current Length of Stay: 1 day(s)  Current Patient Status: Inpatient     Certification Statement: The patient will continue to require additional inpatient hospital stay due to chest tube    Discharge Plan / Estimated Discharge Date: unclear    Code Status: Level 1 - Full Code      Subjective:   Feels ok, having a headache  No SOB, + CP    Objective:     Vitals:   Temp (24hrs), Av 2 °F (36 8 °C), Min:97 3 °F (36 3 °C), Max:98 9 °F (37 2 °C)    Temp:  [97 3 °F (36 3 °C)-98 9 °F (37 2 °C)] 98 4 °F (36 9 °C)  HR:  [100-128] 124  Resp:  [17-18] 18  BP: ()/(52-70) 113/56  SpO2:  [96 %-100 %] 98 %  Body mass index is 40 65 kg/m²  Input and Output Summary (last 24 hours): Intake/Output Summary (Last 24 hours) at 2021 1539  Last data filed at 2021 1430  Gross per 24 hour   Intake 785 ml   Output 1005 ml   Net -220 ml       Physical Exam:   Physical Exam  Vitals signs reviewed  HENT:      Head: Normocephalic and atraumatic  Mouth/Throat:      Mouth: Mucous membranes are moist    Cardiovascular:      Rate and Rhythm: Normal rate and regular rhythm  Heart sounds: Normal heart sounds  Pulmonary:      Effort: Pulmonary effort is normal  No respiratory distress  Breath sounds: Normal breath sounds  No wheezing  Abdominal:      General: There is no distension  Palpations: Abdomen is soft  Tenderness: There is no abdominal tenderness  Musculoskeletal:      Right lower leg: No edema  Left lower leg: No edema  Neurological:      Mental Status: He is alert and oriented to person, place, and time           Additional Data:     Labs:  Results from last 7 days   Lab Units 05/27/21  0155  05/26/21  0429   WBC Thousand/uL 6 38  --  6 00   HEMOGLOBIN g/dL 7 1*   < > 7 6*   HEMATOCRIT % 22 7*   < > 24 6*   PLATELETS Thousands/uL 164  --  191   NEUTROS PCT %  --   --  74   LYMPHS PCT %  --   --  11*   MONOS PCT %  --   --  9   EOS PCT %  --   --  4    < > = values in this interval not displayed  Results from last 7 days   Lab Units 05/27/21  0155  05/22/21  0850   SODIUM mmol/L 133*   < > 129*   POTASSIUM mmol/L 4 6   < > 4 0   CHLORIDE mmol/L 97*   < > 94*   CO2 mmol/L 33*   < > 30   BUN mg/dL 79*   < > 88*   CREATININE mg/dL 1 86*   < > 2 72*   ANION GAP mmol/L 3*   < > 5   CALCIUM mg/dL 9 1   < > 8 3   ALBUMIN g/dL  --   --  3 0*   TOTAL BILIRUBIN mg/dL  --   --  0 46   ALK PHOS U/L  --   --  133*   ALT U/L  --   --  17   AST U/L  --   --  20   GLUCOSE RANDOM mg/dL 142*   < > 116    < > = values in this interval not displayed       Results from last 7 days   Lab Units 05/27/21  1209   INR  1 36*         Results from last 7 days   Lab Units 05/21/21  0931   HEMOGLOBIN A1C % 5 5     Results from last 7 days   Lab Units 05/22/21  0850   LACTIC ACID mmol/L 1 2       Imaging: Reviewed radiology reports from this admission including: chest CT scan    Recent Cultures (last 7 days):     Results from last 7 days   Lab Units 05/22/21  1738   GRAM STAIN RESULT  Rare Polys  No organisms seen   BODY FLUID CULTURE, STERILE  No growth       Lines/Drains:  Invasive Devices     Peripheral Intravenous Line            Peripheral IV 05/26/21 Left;Upper;Ventral (anterior) Arm 1 day          Drain            Chest Tube Right 12 Fr  less than 1 day                Telemetry:        Last 24 Hours Medication List:   Current Facility-Administered Medications   Medication Dose Route Frequency Provider Last Rate    acetaminophen  650 mg Oral Q6H PRN Noah Kahn MD      bisacodyl  5 mg Oral Daily PRN Dominick Alan PA-C      calcium carbonate  1,000 mg Oral Daily PRN Noah Kahn MD      carvedilol  6 25 mg Oral BID With Meals David Tim PA-C      [START ON 5/29/2021] ergocalciferol  50,000 Units Oral Weekly Mary Ellen Garcia MD      furosemide  80 mg Oral BID (diuretic) Mary Ellen Garcia MD      HYDROmorphone  0 2 mg Intravenous Q6H PRN Mary Ellen Garcia MD      metoprolol  2 5 mg Intravenous Q6H PRN David Tim PA-C      midodrine  5 mg Oral TID AC Leobardo Reeves MD      nicotine  1 patch Transdermal Daily Mary Ellen Garcia MD      ondansetron  4 mg Intravenous Q6H PRN Mary Ellen Garcia MD      oxyCODONE-acetaminophen  1 tablet Oral Q6H PRN Mary Ellen Garcia MD      polyethylene glycol  17 g Oral Daily PRN Mary Ellen Garcia MD          Today, Patient Was Seen By: Pop Bland MD    ** Please Note: This note has been constructed using a voice recognition system   **

## 2021-05-27 NOTE — ASSESSMENT & PLAN NOTE
· With baseline hemoglobin approximately 9 5   · Now acute on chronic d/t blood loss  · Patient received 1 unit of PRBC on 05/25/2021 due to bloody chest tube drainage and hematoma  · Stool occult neg  · Hb 6 8 -> 1  U PRBc -> 7 6, now 7 1  · Give 1 U PRBC today  · Continue to monitor H&H

## 2021-05-27 NOTE — ASSESSMENT & PLAN NOTE
· Patient transferred to [place] from [facility] for thoracic surgery evaluation  · History of recurrent right-sided pleural effusion s/p thoracentesis on 05/22, chest tube placed with continued output of bloody fluid  · Fluid-exudative but negative for malignancy, cultures negative  · Repeat chest x-ray on 05/24 with continued effusion  · CT scan performed on 05/26 revealed pleural effusion improvement with new hyperdensities consistent with lung hematomas  · Consulted thoracic surgery for evaluation  · To go to IR today to upsize the tube  · Cont monitoring the output  · IS  · Pain control

## 2021-05-27 NOTE — BRIEF OP NOTE (RAD/CATH)
INTERVENTIONAL RADIOLOGY PROCEDURE NOTE    Date: 5/27/2021    Procedure: Right chest tube placement  Preoperative diagnosis:   1  Pleural effusion on right    2  Stage 3 chronic kidney disease, unspecified whether stage 3a or 3b CKD (HCC)         Postoperative diagnosis: Same  Surgeon: Yemi Clay MD     Assistant: None  No qualified resident was available  Blood loss: MInimal    Specimens: None     Findings: Complex right effusions, 12 F chest tube placed  The angle at which the initial chest tube was placed at did not allow for upsize  The tube was removed and new chest tube was placed following consent from the patient  Complications: None immediate      Anesthesia: local and IV Fentanyl

## 2021-05-28 NOTE — ASSESSMENT & PLAN NOTE
Likely secondary to episodes of hypotension with anemia vs cardiorenal     Plan:  · Nephrology following, appreciate reocmmendations  · Baseline Cr 1 5-1 8, admitted with Cr of 2 8 on 5/21/2021  · Cr improved to baseline  · Patient continued on midodrine  · Continue holding home med Lisinopril 5mg for now  · Ultimately will require outpatient follow up with pt's nephrologist Dr Kelsey Goddard

## 2021-05-28 NOTE — ASSESSMENT & PLAN NOTE
Wt Readings from Last 3 Encounters:   06/09/21 134 kg (296 lb 1 2 oz)   05/26/21 131 kg (288 lb 12 8 oz)   05/22/21 (!) 136 kg (300 lb 0 7 oz)   Followed by Dr Guillermo Lacy OP  Home regimen: lasix 80 mg BID, metolazone 5 mg twice a week  Plan:   · Bumex 1 milligram twice daily initiated on 06/04    Discontinued on 06/06, restarted on 06/07  · Weight has remained stable   · Continue intake and output and daily weights  · Cardiology and Nephrology following, appreciate recommendations

## 2021-05-28 NOTE — PROGRESS NOTES
INTERNAL MEDICINE RESIDENCY PROGRESS NOTE     Name: Dolores Link   Age & Sex: 57 y o  male   MRN: [id number]  [facility] [address] [facility] [facility]   Encounter: [id number]  Team: SOD Team C     PATIENT INFORMATION     Name: Dolores Link   Age & Sex: 57 y o  male   MRN: [id number]  Hospital Stay Days: 2    ASSESSMENT/PLAN     Principal Problem:    Pleural effusion on right  Active Problems:    Chronic anemia    Hypotension    Acute kidney injury superimposed on CKD [facility])    Atrial fibrillation (HCC)    Chronic diastolic (congestive) heart failure (HCC)    Type 2 diabetes mellitus with diabetic chronic kidney disease (HCC)    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 ascites (HCC)    CKD (chronic kidney disease) stage 3, GFR 30-59 ml/min (HCC)      * Pleural effusion on right  Assessment & Plan  · Patient transferred to [place] from [facility] for thoracic surgery evaluation  · History of recurrent right-sided pleural effusion s/p thoracentesis on 05/22, chest tube placed with continued output of bloody fluid  · Fluid-exudative but negative for malignancy, cultures negative  · Repeat chest x-ray on 05/24 with continued effusion  · CT scan performed on 05/26 revealed pleural effusion improvement with new hyperdensities consistent with lung hematomas  · Consulted thoracic surgery for evaluation  · Management per thoracic surgery  · Will do tpa/DNAse today  · Cont monitoring the output  · IS  · Pain control  · Bowel regimen in place    Chronic anemia  Assessment & Plan  · With baseline hemoglobin approximately 9 5   · Now acute on chronic d/t blood loss  · Patient received 1 unit of PRBC on 05/25/2021 due to bloody chest tube drainage and hematoma  · Stool occult neg  · Given another 1 U PRBC on 5/27/2021, Hg improved from 7 1 to 8 0 today  · Continue to monitor H&H    Hypotension  Assessment & Plan  · In the setting of output from chest tube plus Afib/CHF medications (Coreg 6 25 BID, Lasix 80mg BID)  · Midodrine increased to 10mg TID added by nephrology  · Can consider decreasing Coreg to 3 125mg BID, however, pt is currently tachycardic to 130s on exam    Acute kidney injury superimposed on CKD [facility])  Assessment & Plan  · Likely secondary to episodes of hypotension with anemia vs cardiorenal  · Nephrology following, appreciate reocmmendations  · Baseline Cr 1 5-1 8, admitted with Cr of 2 8 on 5/21/2021  · Cr improving, close to baseline at 1 7  · Will continue with home med PO Lasix 80mg BID  · Continue holding home med Lisinopril 5mg for now  · Ultimately will require outpatient follow up with pt's nephrologist Dr Teagan Dickinson    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 ascites ([facility] )  Assessment & Plan  · Last seen by outpatient GI on 04/08   · [place] [id number]  screening is up-to-date   · Last EGD did not show any esophageal varices   · Suspect fatty liver vs cardiac in nature    Type 2 diabetes mellitus with diabetic chronic kidney disease [facility])  Assessment & Plan  Lab Results   Component Value Date    HGBA1C 5 5 05/21/2021     · Controlled hemoglobin A1c  Not on any home meds      Chronic diastolic (congestive) heart failure (HCC)  Assessment & Plan  Wt Readings from Last 3 Encounters:   05/27/21 132 kg (291 lb 7 2 oz)   05/26/21 131 kg (288 lb 12 8 oz)   05/22/21 (!) 136 kg (300 lb 0 7 oz)     · Patient appears slightly hypervolemic with LE edema  · Followed by Dr Dirk Salinas OP  · Home regimen: lasix 80 mg BID, metolazone 5 mg twice a week  · Continue Lasix 80 milligrams b i d -patient has been unable to receive this given low blood pressure  · Weight has remained stable   · Continue intake and output and daily weights      Atrial fibrillation ([facility]  Assessment & Plan  · HR elevated due to resp status  · On Coreg 6 25mg BID with BP on low side  · Chronic anticoagulation with Coumadin 5 milligrams Sunday, Tuesday, Wednesday, Friday, Saturday and 2 5 milligrams Monday and Thursday  · Coumadin currently on hold given anemia and bloody output from chest tube      Disposition: continue inpatient care     SUBJECTIVE     Patient seen and examined  No acute events overnight  Pt complaining of fluid restriction with diet, otherwise, no complaints of  fever, chills, chest pain, shortness of breath or cough  OBJECTIVE     Vitals:    21 0658 21 0800 21 0845 21 0846   BP: 94/63  100/72 98/69   BP Location:    Right arm   Pulse:       Resp: 18      Temp: 97 9 °F (36 6 °C)      TempSrc:       SpO2:  98%     Weight:          Temperature:   Temp (24hrs), Av 5 °F (36 9 °C), Min:97 6 °F (36 4 °C), Max:99 3 °F (37 4 °C)    Temperature: 97 9 °F (36 6 °C)  Intake & Output:  I/O        07 -  0700  07 -  0700  07 -  0700    P  O  125 1020 520    Total Intake(mL/kg) 125 (0 9) 1020 (7 7) 520 (3 9)    Urine (mL/kg/hr) 475 1425 (0 4) 150 (0 2)    Chest Tube 0 235     Total Output 475 1660 150    Net -350 -640 +370               Weights:        Body mass index is 40 49 kg/m²  Weight (last 2 days)     Date/Time   Weight    21 0546   132 (290 35)    21 0545   132 (291 45)            Physical Exam  Constitutional:       General: He is not in acute distress  HENT:      Head: Normocephalic and atraumatic  Mouth/Throat:      Mouth: Mucous membranes are moist    Eyes:      Conjunctiva/sclera: Conjunctivae normal       Pupils: Pupils are equal, round, and reactive to light  Cardiovascular:      Rate and Rhythm: Tachycardia present  Rhythm irregular  Heart sounds: Normal heart sounds  Pulmonary:      Effort: No respiratory distress  Comments: Decrease BS on R lung field, R CT to suction with serosanguineous output, no air leak  Abdominal:      Palpations: Abdomen is soft  Tenderness: There is no abdominal tenderness  Musculoskeletal:      Right lower leg: Edema present  Left lower leg: Edema present  Skin:     General: Skin is warm  Neurological:      General: No focal deficit present        Mental Status: He is alert and oriented to person, place, and time  LABORATORY DATA     Labs: I have personally reviewed pertinent reports  Results from last 7 days   Lab Units 05/28/21  0501 05/27/21  1708 05/27/21  0155  05/26/21  0429  05/23/21  0620   WBC Thousand/uL 4 71  --  6 38  --  6 00   < > 4 30*   HEMOGLOBIN g/dL 8 0* 8 1* 7 1*   < > 7 6*   < > 7 1*   HEMATOCRIT % 25 4* 25 9* 22 7*   < > 24 6*   < > 22 6*   PLATELETS Thousands/uL 146*  --  164  --  191   < > 149   NEUTROS PCT % 74  --   --   --  74  --  73   MONOS PCT % 10  --   --   --  9  --  11    < > = values in this interval not displayed  Results from last 7 days   Lab Units 05/28/21  0501 05/27/21  0155 05/26/21  0429  05/22/21  0850   POTASSIUM mmol/L 4 2 4 6 4 8   < > 4 0   CHLORIDE mmol/L 98* 97* 96*   < > 94*   CO2 mmol/L 30 33* 28   < > 30   BUN mg/dL 65* 79* 85*   < > 88*   CREATININE mg/dL 1 70* 1 86* 2 08*   < > 2 72*   CALCIUM mg/dL 9 2 9 1 9 1   < > 8 3   ALK PHOS U/L  --   --   --   --  133*   ALT U/L  --   --   --   --  17   AST U/L  --   --   --   --  20    < > = values in this interval not displayed  Results from last 7 days   Lab Units 05/27/21  0155   MAGNESIUM mg/dL 2 7*          Results from last 7 days   Lab Units 05/27/21  1209 05/24/21  1159 05/23/21  0620   INR  1 36* 1 44* 2 07*     Results from last 7 days   Lab Units 05/22/21  0850   LACTIC ACID mmol/L 1 2     Results from last 7 days   Lab Units 05/22/21  0850   TROPONIN I ng/mL <0 02       IMAGING & DIAGNOSTIC TESTING     Radiology Results: I have personally reviewed pertinent reports  Ct Chest Wo Contrast    Result Date: 5/26/2021  Impression: Improvement in the prior pleural effusion with new hyperdensities consistent with hematomas with pigtail catheter in place  Atelectasis of the middle lobe and right lower lobe unchanged  Workstation performed: [id number]     Ir Chest Tube Check/change/reposition/upsize    Result Date: 5/27/2021  Impression: Impression: 1  Successful placement of a 12 [place] right chest tube under ultrasound and fluoroscopic guidance  Workstation performed: [id number]     Other Diagnostic Testing: I have personally reviewed pertinent reports  ACTIVE MEDICATIONS     Current Facility-Administered Medications   Medication Dose Route Frequency    acetaminophen (TYLENOL) tablet 650 mg  650 mg Oral Q6H PRN    bisacodyl (DULCOLAX) EC tablet 5 mg  5 mg Oral Daily PRN    calcium carbonate (TUMS) chewable tablet 1,000 mg  1,000 mg Oral Daily PRN    carvedilol (COREG) tablet 6 25 mg  6 25 mg Oral BID With Meals    [START ON 5/29/2021] ergocalciferol (VITAMIN D2) capsule 50,000 Units  50,000 Units Oral Weekly    furosemide (LASIX) tablet 80 mg  80 mg Oral BID (diuretic)    HYDROmorphone HCl (DILAUDID) injection 0 2 mg  0 2 mg Intravenous Q4H PRN    metoprolol (LOPRESSOR) injection 2 5 mg  2 5 mg Intravenous Q6H PRN    midodrine (PROAMATINE) tablet 10 mg  10 mg Oral TID AC    nicotine (NICODERM CQ) 14 mg/24hr TD 24 hr patch 1 patch  1 patch Transdermal Daily    ondansetron (ZOFRAN) injection 4 mg  4 mg Intravenous Q6H PRN    oxyCODONE (ROXICODONE) IR tablet 2 5 mg  2 5 mg Oral Q4H PRN    oxyCODONE (ROXICODONE) IR tablet 5 mg  5 mg Oral Q4H PRN    polyethylene glycol (MIRALAX) packet 17 g  17 g Oral Daily PRN       VTE Pharmacologic Prophylaxis: Reason for no pharmacologic prophylaxis bloody output from chest tube  VTE Mechanical Prophylaxis: sequential compression device    Portions of the record may have been created with voice recognition software  Occasional wrong word or "sound a like" substitutions may have occurred due to the inherent limitations of voice recognition software    Read the chart carefully and recognize, using context, where substitutions have occurred   ==  Lam Ferreira MD  [facility]  Internal Medicine Residency PGY-1

## 2021-05-28 NOTE — ASSESSMENT & PLAN NOTE
Lab Results   Component Value Date    HGBA1C 5 5 05/21/2021     Plan:   · Controlled hemoglobin A1c  Not on any home meds    · Patient with a point of care blood glucose 316 on 6/3, initiated sliding scale insulin algorithm 3  · Continue lifestyle modifications at home

## 2021-05-28 NOTE — PHYSICAL THERAPY NOTE
Physical Therapy Treatment Note       05/28/21 0915   PT Last Visit   PT Visit Date 05/28/21   Note Type   Note Type Treatment   Pain Assessment   Pain Assessment Tool 0-10   Pain Score 2   Pain Location/Orientation Location: Chest   Patient's Stated Pain Goal No pain   Hospital Pain Intervention(s) Ambulation/increased activity   Restrictions/Precautions   Weight Bearing Precautions Per Order No   Other Precautions Multiple lines;Telemetry; Fall Risk;Pain   General   Chart Reviewed Yes   Family/Caregiver Present No   Cognition   Overall Cognitive Status WFL   Arousal/Participation Responsive   Attention Attends with cues to redirect   Orientation Level Oriented X4   Memory Unable to assess   Following Commands Follows one step commands without difficulty   Subjective   Subjective states he feels well overall  some pain in CT site, but improving  willing to mobilize   Transfers   Sit to Stand 5  Supervision   Additional items Assist x 1   Stand to Sit 5  Supervision   Additional items Assist x 1   Ambulation/Elevation   Gait pattern   (slow, antalgic)   Gait Assistance 5  Supervision   Additional items Assist x 1   Assistive Device Rolling walker   Distance 130'x2- standing rest x 1-2 min  after sitting x 2-3 min, did 5'x1 from chair back to bed, repositioning in supine as physicain was to administer TALC into chest tube,     Balance   Static Sitting Normal   Dynamic Sitting Good   Static Standing Fair   Dynamic Standing Fair   Ambulatory Fair   Endurance Deficit   Endurance Deficit Yes   Endurance Deficit Description fatigue, weakness, pain   Activity Tolerance   Activity Tolerance Patient limited by fatigue;Treatment limited secondary to medical complications (Comment)   Nurse Made Aware yes   Assessment   Prognosis Good   Problem List Decreased strength;Decreased endurance; Impaired balance;Decreased mobility   Assessment Pt seen for session for setup, transfers, gait training w/ rest time, repositioning in supine  Pt cooperative w/ session w/ some pain limitation  able to ambulate an increased overall distance w/ less assistance  continue to recommend d/c home when stable   Goals   Patient Goals to go home   STG Expiration Date 06/06/21   PT Treatment Day 1   Plan   Treatment/Interventions Functional transfer training;LE strengthening/ROM; Therapeutic exercise;Elevations; Endurance training;Patient/family training;Equipment eval/education; Bed mobility;Gait training   Progress Progressing toward goals   PT Frequency Other (Comment)  (3-5x/wk)   Recommendation   PT Discharge Recommendation No rehabilitation needs   AM-PAC Basic Mobility Inpatient   Turning in Bed Without Bedrails 4   Lying on Back to Sitting on Edge of Flat Bed 4   Moving Bed to Chair 4   Standing Up From Chair 4   Walk in Room 3   Climb 3-5 Stairs 3   Basic Mobility Inpatient Raw Score 22   Basic Mobility Standardized Score 47 4   Carolina Ortega PT, DPT CSRS

## 2021-05-28 NOTE — ASSESSMENT & PLAN NOTE
In the setting of output from chest tube plus Afib/CHF medications (Coreg 6 25 BID -> metoprolol 25 mg BID, Lasix 80mg BID - bumex 1 mg BID)    Plan:  · Midodrine increased to 10mg TID

## 2021-05-28 NOTE — PROGRESS NOTES
Follow up Consultation    Nephrology   Clint Miles 57 y o  male MRN: [id number]  Unit/Bed#: [address] 422-01 Encounter: [id number]      Physician Requesting Consult: Dhruv Henry DO        ASSESSMENT/PLAN:  60-year-old male with multiple comorbidities including morbid obesity, CHF, AFib and hypertension transferred in from the [facility] for thoracic surgery evaluation   Acute kidney injury (POA) on CKD stage 3:  - MARISABEL most likely secondary to failure to auto regulate in presence of hemodynamic perturbations with hypotension plus anemia plus cardiorenal syndrome plus Ace inhibitor use with subsequent ATN  - After review of records In [facility] as well as Care everywhere it appears that the patient has a baseline Creatinine of at 1 5-1 8 mg/dL  - patient was admitted with a creatinine of 2 80 mg/dL on 05/21/2021   - patient's creatinine today is at 1 70 mg/dL, stable and improved back to baseline   - okay to continue diuretics for now as long as blood pressures are improved  - check BMP in a m   - Await renal recovery  - Optimize hemodynamic status to avoid delay in renal recovery  - Place on a renal diet when allowed diet order    - Avoid nephrotoxins, adjust meds to appropriate GFR   - Strict I/O   - Daily weights  - Urinary retention protocol if patient does not have a Gann  - Most likely has underlying CKD secondary to underlying liver disease plus age-related nephron loss plus obesity related hyper filtration  - will need to set up patient for follow up with Nephrology as an outpatient post hospitalization   - for nephrology as an outpatient patient follows up with Dr Princess Montilla Blood pressure:  - current medications:  Coreg 6 25 mg p o  B i d , Lasix 80 mg p o  B i d  midodrine 5 mg p o  T i d   - recommendations:  Recommend increase to midodrine 10 mg p o  T i d  Hold for SBP greater than 110 and see if okay with Cardiology to decrease Coreg to 3 125 mg p o  B i d   - Optimize hemodynamics    - Maintain MAP > 65mmHg  - Avoid BP fluctuations   H/H/anemia:  - most recent hemoglobin at 8 0 grams/deciliter  - maintain hemoglobin greater than 8 grams/deciliter  - management primary team      Acid-base electrolytes:  o Hyponatremia:    - Most recent sodium at 133 mEq  - If sodium continues to drop will consider dose of tolvaptan 7 5 mg p o  X1  - Remains on nicotine    o Hypermagnesemia:   - Most recent magnesium at 2 7 continue to monitor    o  Acid-base:    - Most recent bicarb at 30     Volume status:  - Clinically appears to be minimally hypervolemic  Okay to continue current diuretics   Proteinuria:   o Most recent UA with no protein as of May 2021     Other medical problems:  o Diabetes:  Management per primary team  Most recent A1c 5 5% as of May 2021  o Liver cirrhosis:  Management primary team  o CHF:  Management per primary team   At home is on Lasix 80 mg p o  B i d  And metolazone 5 mg p o  Twice a week  Follow-up with cardiology  o Right pleural effusion:  Follow-up with CT surgery and Pulmonary for possible VATS  For now to get tPA/DNase          Thanks for the consult  Will continue to follow  Please call with questions/ concerns  Above-mentioned orders and Plan in terms of acute kidney injury was discussed with the team in [address] MD, [facility], 2021, 10:06 AM              Objective :   Patient seen and examined in his room blood pressure still remain low remains afebrile urine output 1 4 L  Got 1 dose of Lasix yesterday  Reports breathing is stable no significant improvement in his edema        PHYSICAL EXAM  BP 98/69 (BP Location: Right arm)   Pulse 100   Temp 97 9 °F (36 6 °C)   Resp 18   Wt 132 kg (290 lb 5 5 oz)   SpO2 98%   BMI 40 49 kg/m²   Temp (24hrs), Av 5 °F (36 9 °C), Min:97 6 °F (36 4 °C), Max:99 3 °F (37 4 °C)        Intake/Output Summary (Last 24 hours) at 2021 1006  Last data filed at 2021 0847  Gross per 24 hour   Intake 960 ml Output 1310 ml   Net -350 ml       I/O last 24 hours: In: 1320 [P O :1320]  Out: 1810 [Urine:1575; Chest Tube:235]      Current Weight: Weight - Scale: 132 kg (290 lb 5 5 oz)  First Weight: Weight - Scale: 132 kg (291 lb 7 2 oz)  Physical Exam  Vitals signs and nursing note reviewed  Constitutional:       General: He is not in acute distress  Appearance: Normal appearance  He is not ill-appearing, toxic-appearing or diaphoretic  HENT:      Head: Normocephalic and atraumatic  Mouth/Throat:      Mouth: Mucous membranes are moist       Pharynx: Oropharynx is clear  No oropharyngeal exudate  Eyes:      General: No scleral icterus  Conjunctiva/sclera: Conjunctivae normal    Neck:      Musculoskeletal: Normal range of motion and neck supple  Cardiovascular:      Rate and Rhythm: Normal rate  Heart sounds: Normal heart sounds  No friction rub  Pulmonary:      Effort: Pulmonary effort is normal  No respiratory distress  Breath sounds: Normal breath sounds  No wheezing  Comments: Right chest tube  Abdominal:      General: There is no distension  Palpations: Abdomen is soft  There is no mass  Tenderness: There is no abdominal tenderness  Musculoskeletal:         General: No swelling  Comments: Trace edema bilateral extremities   Skin:     General: Skin is warm  Coloration: Skin is not jaundiced  Neurological:      General: No focal deficit present  Mental Status: He is alert and oriented to person, place, and time  Psychiatric:         Mood and Affect: Mood normal          Behavior: Behavior normal              Review of Systems   Constitutional: Negative for chills and fatigue  HENT: Negative for congestion  Respiratory: Negative for cough, shortness of breath and wheezing  Cardiovascular: Positive for leg swelling  Gastrointestinal: Negative for abdominal pain, constipation, diarrhea, nausea and vomiting  Genitourinary: Negative for dysuria  Musculoskeletal: Negative for back pain  Skin: Negative for rash  Neurological: Negative for dizziness and headaches  Psychiatric/Behavioral: Negative for agitation and confusion  All other systems reviewed and are negative  Scheduled Meds:  Current Facility-Administered Medications   Medication Dose Route Frequency Provider Last Rate    acetaminophen  650 mg Oral Q6H PRN Monet Eleni, MD      bisacodyl  5 mg Oral Daily PRN Sandra Shields PA-C      calcium carbonate  1,000 mg Oral Daily PRN Monet Eleni, MD      carvedilol  6 25 mg Oral BID With Meals David Tim PA-C      [START ON 5/29/2021] ergocalciferol  50,000 Units Oral Weekly Monet MD Eleni      furosemide  80 mg Oral BID (diuretic) Monet Eleni, MD      HYDROmorphone  0 2 mg Intravenous Q4H PRN Nicole Hathaway MD      metoprolol  2 5 mg Intravenous Q6H PRN David Tim PA-C      midodrine  5 mg Oral TID AC Axel Copeland MD      nicotine  1 patch Transdermal Daily Monet Eleni, MD      ondansetron  4 mg Intravenous Q6H PRN Monet Route, MD      oxyCODONE  2 5 mg Oral Q4H PRN Nicole Hathaway MD      oxyCODONE  5 mg Oral Q4H PRN Nicole Hathaway MD      polyethylene glycol  17 g Oral Daily PRN Monet Knowles MD         PRN Meds:   acetaminophen    bisacodyl    calcium carbonate    HYDROmorphone    metoprolol    ondansetron    oxyCODONE    oxyCODONE    polyethylene glycol    Continuous Infusions:       Invasive Devices:      Invasive Devices     Peripheral Intravenous Line            Peripheral IV 05/26/21 Left;Upper;Ventral (anterior) Arm 1 day          Drain            Chest Tube Right 12 Fr  less than 1 day                  LABORATORY:    Results from last 7 days   Lab Units 05/28/21  0501 05/27/21  1708 05/27/21  0155 05/26/21  1055 05/26/21  0429 05/25/21  1808 05/25/21  0520 05/24/21  0728  05/23/21  0620  05/22/21  0850   WBC Thousand/uL 4 71  --  6 38  --  6 00  --  5 01 4 39  --  4 30*  --  3 74*   HEMOGLOBIN g/dL 8 0* 8 1* 7 1* 7 5* 7 6* 7 1* 6 8* 7 7*   < > 7 1*   < > 7 6*   HEMATOCRIT % 25 4* 25 9* 22 7* 23 5* 24 6*  --  21 7* 25 0*   < > 22 6*   < > 23 4*   PLATELETS Thousands/uL 146*  --  164  --  191  --  166 173  --  149  --  162   POTASSIUM mmol/L 4 2  --  4 6  --  4 8  --  4 9 4 3  --  4 4  --  4 0   CHLORIDE mmol/L 98*  --  97*  --  96*  --  97* 97*  --  96*  --  94*   CO2 mmol/L 30  --  33*  --  28  --  30 30  --  29  --  30   BUN mg/dL 65*  --  79*  --  85*  --  85* 80*  --  82*  --  88*   CREATININE mg/dL 1 70*  --  1 86*  --  2 08*  --  2 31* 2 36*  --  2 43*  --  2 72*   CALCIUM mg/dL 9 2  --  9 1  --  9 1  --  9 2 9 4  --  8 8  --  8 3   MAGNESIUM mg/dL  --   --  2 7*  --   --   --   --   --   --   --   --   --     < > = values in this interval not displayed  rest all reviewed    RADIOLOGY:  IR chest tube check/change/reposition/upsize   Final Result by Gustavo Bernstein MD (05/27 1637)   Impression:   1  Successful placement of a 12 Nepalese right chest tube under ultrasound and fluoroscopic guidance  Workstation performed: [id number]           Rest all reviewed    Portions of the record may have been created with voice recognition software  Occasional wrong word or "sound a like" substitutions may have occurred due to the inherent limitations of voice recognition software  Read the chart carefully and recognize, using context, where substitutions have occurred  If you have any questions, please contact the dictating provider

## 2021-05-28 NOTE — QUICK NOTE
The patient was instructed to lie in left lateral position and the right chest tube was clamped and disconnected from the pleurevac  10 mg tpa in 30 cc saline and 5 mg dornase in 30 cc saline were instilled into the chest tube with clamp released and it was clamped again  The RN will unclamp the tube in 2 hours at 1130 and place the chest tube back to suction  The patient was instructed to lie supine and change position frequently in the meantime

## 2021-05-28 NOTE — CASE MANAGEMENT
Pt is a transfer from [facility] for thoracic sx evaluation  See cm note done 5/26  CM will continue to follow for d/c needs

## 2021-05-28 NOTE — ASSESSMENT & PLAN NOTE
Last seen by outpatient GI on 04/08  [facility]  screening is up-to-date  Last EGD did not show any esophageal varices      Plan:  · Suspect fatty liver vs cardiac in nature  · MELD of 18, Child Malloy class B  · Elastography revealed a Metavir score of F3 indicating severe fibrosis, steatosis S3 (greater than 67%)  · GI now recommending outpatient transjugular liver biopsy along with portal pressure measurements by IR

## 2021-05-28 NOTE — PLAN OF CARE
Problem: PHYSICAL THERAPY ADULT  Goal: Performs mobility at highest level of function for planned discharge setting  See evaluation for individualized goals  Description: Treatment/Interventions: Functional transfer training, LE strengthening/ROM, Elevations, Therapeutic exercise, Endurance training, Bed mobility, Gait training, Spoke to nursing, Spoke to case management, OT          See flowsheet documentation for full assessment, interventions and recommendations  Outcome: Progressing  Note: Prognosis: Good  Problem List: Decreased strength, Decreased endurance, Impaired balance, Decreased mobility  Assessment: Pt seen for session for setup, transfers, gait training w/ rest time, repositioning in supine  Pt cooperative w/ session w/ some pain limitation  able to ambulate an increased overall distance w/ less assistance  continue to recommend d/c home when stable           PT Discharge Recommendation: No rehabilitation needs          See flowsheet documentation for full assessment

## 2021-05-28 NOTE — ASSESSMENT & PLAN NOTE
With baseline hemoglobin approximately 9 5  Now acute on chronic d/t blood loss      Plan:  · Patient received 1 unit of PRBC on 05/25/2021 due to bloody chest tube drainage and hematoma  · Stool occult neg  · Given another 1 U PRBC on 5/27/2021  · Additional unit of packed red blood cells given on 05/31  · Hemoglobin down to 7 2 on 06/03 s/p 1 unit PRBC  · Hemoglobin down to 7 0 on 6/7, s/p 1 unit PRBC   · Continue to monitor, hemoglobin 8 2 after chest tube removal, warfarin restarted on 06/08

## 2021-05-28 NOTE — ASSESSMENT & PLAN NOTE
· With baseline hemoglobin approximately 9 5   · Now acute on chronic d/t blood loss  · Patient received 1 unit of PRBC on 05/25/2021 due to bloody chest tube drainage and hematoma  · Stool occult neg  · Given another 1 U PRBC on 5/27/2021  · Continue to monitor H&H

## 2021-05-28 NOTE — PROGRESS NOTES
Patient offering complaints regarding nutrition services and the tray that was sent for lunch  Patient refused tray reporting that he never receives what he orders, he continues to threaten that he will be seeking legal action in regards to his restrictions  Educated patient on ordered diet and called nutrition services supervisor to check on status of menu selection  Was informed that patient was sent a basic tray due to him becoming belligerent with staff when calling to get food orders  Questioned what patient wanted and placed order for lunch  Patient expressed satisfaction with lunch choices

## 2021-05-28 NOTE — ASSESSMENT & PLAN NOTE
HR elevated due to resp status  History of Afib      Plan:  · Previously on On Coreg 6 25mg BID, increased to 12 5 twice daily that he was switched to metoprolol 25 mg BID due to pressure effects of Coreg  · Chronic anticoagulation with Coumadin 5 milligrams Sunday, Tuesday, Wednesday, Friday, Saturday and 2 5 milligrams Monday and Thursday  · Coumadin restarted on 06/08, will continue at 2 5 mg till his INR appointment next week  · Follow-up with cardiology outpatient  · [id number] 3

## 2021-05-28 NOTE — PROGRESS NOTES
Progress Note - Janie Remy 57 y o  male MRN: [id number]    Unit/Bed#: [facility] 422-01 Encounter: [id number]      Assessment:  56 yo male with recurrent right sided pleural effusion  Patient is s/p R 8fr chest tube placed by IR on 5/22  Now s/p tube upsize to 12 Welsh catheter by IR on 5/27    Briefly hypotensive, but resolved  Tachycardic  98% on room air  R CT to sxn 235 cc sang output  Uo-1 425L  Hgb 8 0(8 1)  WBC 4 71    Plan:  Continue chest tube to suction  Possible lytic therapy today with TPA/DNAse will discuss with team this morning  If no resolution with lytic therapy consider VATS  Remainder of care per primary      Subjective:   No acute events overnight  No nausea or vomiting  Compaling that he isn't getting a lot to eat  No fevers/chills  No SOB  Pain at CT site  Objective:     Vitals: Blood pressure 105/55, pulse 100, temperature 97 6 °F (36 4 °C), temperature source Oral, resp  rate 18, weight 132 kg (290 lb 5 5 oz), SpO2 98 %  ,Body mass index is 40 49 kg/m²  Intake/Output Summary (Last 24 hours) at 5/28/2021 1031  Last data filed at 5/28/2021 0600  Gross per 24 hour   Intake 1020 ml   Output 1660 ml   Net -640 ml       Physical Exam:   General: NAD  HENT: NCAT MMM  Neck: supple, no JVD  CV: nl rate  Lungs: nl wob  No resp distress  R CT to sxn with no air leak   Sang output  ABD: Soft, nontender, nondistended  Extrem: No CCE  Neuro: AAOx3       Invasive Devices     Peripheral Intravenous Line            Peripheral IV 05/26/21 Left;Upper;Ventral (anterior) Arm 1 day          Drain            Chest Tube Right 12 Fr  less than 1 day                Lab, Imaging and other studies: I have personally reviewed pertinent films in PACS  VTE Pharmacologic Prophylaxis: Sequential compression device (Venodyne)   VTE Mechanical Prophylaxis: sequential compression device

## 2021-05-28 NOTE — ASSESSMENT & PLAN NOTE
[place] [facility] for thoracic surgery evaluation  History of recurrent right-sided pleural effusion s/p thoracentesis on 05/22, chest tube placed with continued output of bloody fluid  Fluid-exudative but negative for malignancy, cultures negative      Plan:  · Repeat chest x-ray on 05/24 with continued effusion  · CT scan performed on 05/26 revealed pleural effusion improvement with new hyperdensities consistent with lung hematomas  · Consulted thoracic surgery for evaluation  · Management per thoracic surgery  · S/p tpa/DNAse on 5/29 with good output  · CT of the chest on 05/30 revealed a small loculated right hydropneumothorax and benign rounded atelectasis  · Patient is now status post VATS with decortication on the right on 06/02, follow thoracic surgery recommendations:  Last chest tube to be removed on 06/07, warfarin 5 mg restarted on 06/08, decreased 2 5 mg on discharge  · IS, pulmonary toilet  · Pain control  · Bowel regimen in place

## 2021-05-28 NOTE — PROGRESS NOTES
Patient was noted to have put out a total of 1190 mL of Ct drainage since the chest tube was unclamped  Patient offering no complaints and VS stable  Notified Dr Calderon Baird with thoracic surgery  No new orders at this time

## 2021-05-29 NOTE — PROGRESS NOTES
INTERNAL MEDICINE RESIDENCY PROGRESS NOTE     Name: Rossi Hernandez   Age & Sex: 57 y o  male   MRN: [id number]  Unit/Bed#: [address] 422-01   Encounter: [id number]  Team: SOD Team C     PATIENT INFORMATION     Name: Rossi Hernandez   Age & Sex: 57 y o  male   MRN: [id number]  Hospital Stay Days: 3    ASSESSMENT/PLAN     Principal Problem:    Pleural effusion on right  Active Problems:    Chronic anemia    Hypotension    Acute kidney injury superimposed on CKD [facility])    Atrial fibrillation (HCC)    Chronic diastolic (congestive) heart failure (HCC)    Type 2 diabetes mellitus with diabetic chronic kidney disease (HCC)    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 ascites (HCC)    CKD (chronic kidney disease) stage 3, GFR 30-59 ml/min (HCC)      * Pleural effusion on right  Assessment & Plan  · Patient transferred to [facility] from [facility] for thoracic surgery evaluation  · History of recurrent right-sided pleural effusion s/p thoracentesis on 05/22, chest tube placed with continued output of bloody fluid  · Fluid-exudative but negative for malignancy, cultures negative  · Repeat chest x-ray on 05/24 with continued effusion  · CT scan performed on 05/26 revealed pleural effusion improvement with new hyperdensities consistent with lung hematomas  · Consulted thoracic surgery for evaluation  · Management per thoracic surgery  · S/p tpa/DNAse yesterday and likely planning for another trial today  · Likely need CT scan of the chest on Sunday or Monday to evaluate pleural space  · Cont monitoring the output  · IS  · Pain control  · Bowel regimen in place    Chronic anemia  Assessment & Plan  · With baseline hemoglobin approximately 9 5   · Now acute on chronic d/t blood loss  · Patient received 1 unit of PRBC on 05/25/2021 due to bloody chest tube drainage and hematoma  · Stool occult neg  · Given another 1 U PRBC on 5/27/2021  · HG from 8 0 yesterday to 7 8 today  · Continue to monitor H&H    Hypotension  Assessment & Plan  · In the setting of output from chest tube plus Afib/CHF medications (Coreg 6 25 BID, Lasix 80mg BID)  · Midodrine increased to 10mg TID added by nephrology  · Can consider decreasing Coreg to 3 125mg BID, however, pt is currently tachycardic to 130s on exam    Acute kidney injury superimposed on CKD [facility])  Assessment & Plan  · Likely secondary to episodes of hypotension with anemia vs cardiorenal  · Nephrology following, appreciate reocmmendations  · Baseline Cr 1 5-1 8, admitted with Cr of 2 8 on 5/21/2021  · Cr improved to baseline of 1 5  · Will continue with home med PO Lasix 80mg BID  · Continue holding home med Lisinopril 5mg for now  · Ultimately will require outpatient follow up with pt's nephrologist Dr Mando Dolan    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 ascites ([facility] )  Assessment & Plan  · Last seen by outpatient GI on 04/08   · [place] [id number]  screening is up-to-date   · Last EGD did not show any esophageal varices   · Suspect fatty liver vs cardiac in nature    Type 2 diabetes mellitus with diabetic chronic kidney disease [facility])  Assessment & Plan  Lab Results   Component Value Date    HGBA1C 5 5 05/21/2021     · Controlled hemoglobin A1c  Not on any home meds      Chronic diastolic (congestive) heart failure (HCC)  Assessment & Plan  Wt Readings from Last 3 Encounters:   05/27/21 132 kg (291 lb 7 2 oz)   05/26/21 131 kg (288 lb 12 8 oz)   05/22/21 (!) 136 kg (300 lb 0 7 oz)     · Patient appears slightly hypervolemic with LE edema  · Followed by Dr Nisha Rodríguez OP  · Home regimen: lasix 80 mg BID, metolazone 5 mg twice a week  · Continue Lasix 80 milligrams b i d -patient has been unable to receive this given low blood pressure  · Weight has remained stable   · Continue intake and output and daily weights      Atrial fibrillation ([facility] )  Assessment & Plan  · HR elevated due to resp status  · On Coreg 6 25mg BID with BP on low side  · Chronic anticoagulation with Coumadin 5 milligrams Sunday, Tuesday, Wednesday, Friday, Saturday and 2 5 milligrams Monday and Thursday  · Coumadin currently on hold given anemia and bloody output from chest tube        Disposition: continue inpatient care     SUBJECTIVE     Patient seen and examined  No acute events overnight  No CP, SOB, fever, chills, coughing  Pt just notes having some discomfort over CT tube site, improved from before  OBJECTIVE     Vitals:    21 2329 21 0000 21 0600 21 0704   BP: 106/68   94/66   BP Location:       Pulse: (!) 130   100   Resp: 19      Temp: 98 °F (36 7 °C)   98 5 °F (36 9 °C)   TempSrc:       SpO2: 97% 97%  97%   Weight:   131 kg (289 lb 0 4 oz)       Temperature:   Temp (24hrs), Av 3 °F (36 8 °C), Min:97 9 °F (36 6 °C), Max:98 7 °F (37 1 °C)    Temperature: 98 5 °F (36 9 °C)  Intake & Output:  I/O        07 -  0700  07 -  0700  07 -  0700    P  O  1020 1060     Total Intake(mL/kg) 1020 (7 7) 1060 (8 1)     Urine (mL/kg/hr) 1425 (0 4) 3015 (1)     Chest Tube 235 1560     Total Output 1660 2471     Net -950 -9468                Weights:        Body mass index is 40 31 kg/m²  Weight (last 2 days)     Date/Time   Weight    21 0600   131 (289 02)    21 0546   132 (290 35)    21 0545   132 (291 45)            Physical Exam  Constitutional:       General: He is not in acute distress  HENT:      Head: Normocephalic and atraumatic  Mouth/Throat:      Mouth: Mucous membranes are moist    Eyes:      Conjunctiva/sclera: Conjunctivae normal       Pupils: Pupils are equal, round, and reactive to light  Cardiovascular:      Rate and Rhythm: Tachycardia present  Rhythm irregular  Heart sounds: Normal heart sounds  Pulmonary:      Effort: No respiratory distress  Comments: Decrease BS on R lung field, R CT to suction with serosanguineous output, no air leak  Abdominal:      Palpations: Abdomen is soft  Tenderness: There is no abdominal tenderness     Musculoskeletal:      Right lower leg: Edema present  Left lower leg: Edema present  Skin:     General: Skin is warm  Neurological:      General: No focal deficit present  Mental Status: He is alert and oriented to person, place, and time  LABORATORY DATA     Labs: I have personally reviewed pertinent reports  Results from last 7 days   Lab Units 05/29/21  0444 05/28/21  0501 05/27/21  1708 05/27/21  0155  05/26/21  0429  05/23/21  0620   WBC Thousand/uL 5 11 4 71  --  6 38  --  6 00   < > 4 30*   HEMOGLOBIN g/dL 7 8* 8 0* 8 1* 7 1*   < > 7 6*   < > 7 1*   HEMATOCRIT % 25 1* 25 4* 25 9* 22 7*   < > 24 6*   < > 22 6*   PLATELETS Thousands/uL 158 146*  --  164  --  191   < > 149   NEUTROS PCT %  --  74  --   --   --  74  --  73   MONOS PCT %  --  10  --   --   --  9  --  11    < > = values in this interval not displayed  Results from last 7 days   Lab Units 05/29/21  0444 05/28/21  0501 05/27/21  0155   POTASSIUM mmol/L 3 9 4 2 4 6   CHLORIDE mmol/L 98* 98* 97*   CO2 mmol/L 32 30 33*   BUN mg/dL 52* 65* 79*   CREATININE mg/dL 1 51* 1 70* 1 86*   CALCIUM mg/dL 9 0 9 2 9 1     Results from last 7 days   Lab Units 05/29/21  0444 05/27/21  0155   MAGNESIUM mg/dL 2 7* 2 7*          Results from last 7 days   Lab Units 05/27/21  1209 05/24/21  1159 05/23/21  0620   INR  1 36* 1 44* 2 07*               IMAGING & DIAGNOSTIC TESTING     Radiology Results: I have personally reviewed pertinent reports  Ct Chest Wo Contrast    Result Date: 5/26/2021  Impression: Improvement in the prior pleural effusion with new hyperdensities consistent with hematomas with pigtail catheter in place  Atelectasis of the middle lobe and right lower lobe unchanged  Workstation performed: [id number]     Ir Chest Tube Check/change/reposition/upsize    Result Date: 5/27/2021  Impression: Impression: 1  Successful placement of a 12 Welsh right chest tube under ultrasound and fluoroscopic guidance   Workstation performed: [id number]     Other Diagnostic Testing: I have personally reviewed pertinent reports  ACTIVE MEDICATIONS     Current Facility-Administered Medications   Medication Dose Route Frequency    acetaminophen (TYLENOL) tablet 650 mg  650 mg Oral Q6H PRN    calcium carbonate (TUMS) chewable tablet 1,000 mg  1,000 mg Oral Daily PRN    carvedilol (COREG) tablet 6 25 mg  6 25 mg Oral BID With Meals    ergocalciferol (VITAMIN D2) capsule 50,000 Units  50,000 Units Oral Weekly    furosemide (LASIX) tablet 80 mg  80 mg Oral BID (diuretic)    HYDROmorphone HCl (DILAUDID) injection 0 2 mg  0 2 mg Intravenous Q4H PRN    metoprolol (LOPRESSOR) injection 2 5 mg  2 5 mg Intravenous Q6H PRN    midodrine (PROAMATINE) tablet 10 mg  10 mg Oral TID AC    nicotine (NICODERM CQ) 14 mg/24hr TD 24 hr patch 1 patch  1 patch Transdermal Daily    ondansetron (ZOFRAN) injection 4 mg  4 mg Intravenous Q6H PRN    oxyCODONE (ROXICODONE) IR tablet 2 5 mg  2 5 mg Oral Q4H PRN    oxyCODONE (ROXICODONE) IR tablet 5 mg  5 mg Oral Q4H PRN    polyethylene glycol (MIRALAX) packet 17 g  17 g Oral Daily PRN    potassium chloride (K-DUR,KLOR-CON) CR tablet 20 mEq  20 mEq Oral Once    senna-docusate sodium (SENOKOT S) 8 6-50 mg per tablet 1 tablet  1 tablet Oral HS       VTE Pharmacologic Prophylaxis: Reason for no pharmacologic prophylaxis bloody output from chest tube  VTE Mechanical Prophylaxis: sequential compression device    Portions of the record may have been created with voice recognition software  Occasional wrong word or "sound a like" substitutions may have occurred due to the inherent limitations of voice recognition software    Read the chart carefully and recognize, using context, where substitutions have occurred   ==  Nilson Ballard MD  [address]  Internal Medicine Residency PGY-1

## 2021-05-29 NOTE — PROGRESS NOTES
Progress Note - Thoracic Surgery   Sally Lara 57 y o  male MRN: [id number]  Unit/Bed#: [facility] 422-01 Encounter: [id number]    Assessment:  58 y/o M p/w R pleural effusion, s/p IR 8Fr CT placement on 5/22 (now removed), new R 12Fr CT placement by IR on 5/27    R CT to -20 sxn with 1375 cc of ss output, -AL    Plan:  --Continue R CT to suction  --May require additional tPA/DNase admins via R CT  --IS, pulm toilet  --Pain control    Subjective/Objective     Subjective:     No acute events overnight  This morning, pt c/o slight discomfort at R CT site  Objective:     Blood pressure 106/68, pulse (!) 130, temperature 98 °F (36 7 °C), resp  rate 19, weight 132 kg (290 lb 5 5 oz), SpO2 97 %  ,Body mass index is 40 49 kg/m²  Intake/Output Summary (Last 24 hours) at 5/29/2021 0545  Last data filed at 5/29/2021 0401  Gross per 24 hour   Intake 1060 ml   Output 4162 ml   Net -3102 ml       Invasive Devices     Peripheral Intravenous Line            Peripheral IV 05/26/21 Left;Upper;Ventral (anterior) Arm 2 days          Drain            Chest Tube Right 12 Fr  1 day                Physical Exam:     GEN: NAD  HEENT: MMM  Chest: R CT to -20 sxn with ss output, -AL  CV: RRR  Lung: normal effort  Ab: Soft, NT/ND  Extrem: No CCE  Neuro:  A+Ox3, motor and sensation grossly intact    Lab, Imaging and other studies:  CBC:   Lab Results   Component Value Date    WBC 5 11 05/29/2021    HGB 7 8 (L) 05/29/2021    HCT 25 1 (L) 05/29/2021    MCV 97 05/29/2021     05/29/2021    MCH 30 2 05/29/2021    MCHC 31 1 (L) 05/29/2021    RDW 16 9 (H) 05/29/2021    MPV 10 5 05/29/2021   , CMP: No results found for: SODIUM, K, CL, CO2, ANIONGAP, BUN, CREATININE, GLUCOSE, CALCIUM, AST, ALT, ALKPHOS, PROT, BILITOT, EGFR, Coagulation: No results found for: PT, INR, APTT  VTE Pharmacologic Prophylaxis: Sequential compression device (Venodyne)   VTE Mechanical Prophylaxis: sequential compression device

## 2021-05-29 NOTE — PROGRESS NOTES
Follow up Consultation    Nephrology   Para Erp 57 y o  male MRN: [id number]  Unit/Bed#: [address] 422-01 Encounter: [id number]      Physician Requesting Consult: Corona Grady DO        ASSESSMENT/PLAN:  58-year-old male with multiple comorbidities including morbid obesity, CHF, AFib and hypertension transferred in from the [facility] for thoracic surgery evaluation   Acute kidney injury (POA) on CKD stage 3:  - MARISABEL most likely secondary to failure to auto regulate in presence of hemodynamic perturbations with hypotension plus anemia plus cardiorenal syndrome plus Ace inhibitor use with subsequent ATN  - After review of records [facility] as well as Care everywhere it appears that the patient has a baseline Creatinine of at 1 5-1 8 mg/dL  - patient was admitted with a creatinine of 2 80 mg/dL on 05/21/2021   - patient's creatinine today is at 1 51 mg/dL, stable and continues to prove back to baseline   - okay to continue diuretics for now as long as blood pressures are improved  Has not been getting the dose for now will DC  - check BMP in a m   - Await renal recovery  - Optimize hemodynamic status to avoid delay in renal recovery    - Place on a renal diet when allowed diet order    - Avoid nephrotoxins, adjust meds to appropriate GFR   - Strict I/O   - Daily weights  - Urinary retention protocol if patient does not have a Gann  - Most likely has underlying CKD secondary to underlying liver disease plus age-related nephron loss plus obesity related hyper filtration  - will need to set up patient for follow up with Nephrology as an outpatient post hospitalization   - for nephrology as an outpatient patient follows up with Dr Anika Sheriff Blood pressure:  - current medications:  Coreg 6 25 mg p o  B i d , Lasix 80 mg p o  B i d  midodrine 10 mg p o  T i d   - recommendations:  Recommend see if okay with Cardiology to decrease Coreg to 3 125 mg p o  B i d  will DC Lasix order since patient has not been receiving any ways due to his low blood pressures  And has good urine output  - Optimize hemodynamics   - Maintain MAP > 65mmHg  - Avoid BP fluctuations   H/H/anemia:  - most recent hemoglobin at 7 8 grams/deciliter  - maintain hemoglobin greater than 8 grams/deciliter  - management primary team      Acid-base electrolytes:  o Hyponatremia:    - Most recent sodium at 134 mEq  - If sodium continues to drop will consider dose of tolvaptan 7 5 mg p o  X1  - Remains on nicotine    o Hypermagnesemia:   - Most recent magnesium at 2 7 continue to monitor    o  Acid-base:    - Most recent bicarb at 32     Volume status:  - Clinically appears to be minimally hypervolemic  Okay to continue current diuretics as long as tolerable with blood pressure   Proteinuria:   o Most recent UA with no protein as of May 2021     Other medical problems:  o Diabetes:  Management per primary team  Most recent A1c 5 5% as of May 2021  o Liver cirrhosis:  Management primary team  o CHF:  Management per primary team   At home is on Lasix 80 mg p o  B i d  And metolazone 5 mg p o  Twice a week  Follow-up with cardiology  o Right pleural effusion:  Follow-up with CT surgery and Pulmonary for possible VATS  Status post tPA/DNase on  and for repeat dose on           Thanks for the consult  Will continue to follow  Please call with questions/ concerns  Above-mentioned orders and Plan in terms of acute kidney injury was discussed with the team in [address] Melendez MD, [facility], 2021, 6:23 AM              Objective :   Patient seen and examined in his room no overnight events hemodynamically blood pressure still remain in the 90s urine output 3 L  Chest tube output 1 5 L  Appears to be in the diuretic phase of ATN has not been receiving his Lasix        PHYSICAL EXAM  /68   Pulse (!) 130   Temp 98 °F (36 7 °C)   Resp 19   Wt 131 kg (289 lb 0 4 oz)   SpO2 97%   BMI 40 31 kg/m²   Temp (24hrs), Av 1 °F (36 7 °C), Min:97 9 °F (36 6 °C), Max:98 7 °F (37 1 °C)        Intake/Output Summary (Last 24 hours) at 5/29/2021 0623  Last data filed at 5/29/2021 0501  Gross per 24 hour   Intake 1060 ml   Output 4025 ml   Net -2965 ml       I/O last 24 hours: In: 1858 [P O :1420]  Out: 4790 [BKHLO:6488; Chest Tube:1700]      Current Weight: Weight - Scale: 131 kg (289 lb 0 4 oz)  First Weight: Weight - Scale: 132 kg (291 lb 7 2 oz)  Physical Exam  Vitals signs and nursing note reviewed  Constitutional:       General: He is not in acute distress  Appearance: Normal appearance  He is obese  He is not ill-appearing, toxic-appearing or diaphoretic  HENT:      Head: Normocephalic and atraumatic  Mouth/Throat:      Mouth: Mucous membranes are moist       Pharynx: Oropharynx is clear  No oropharyngeal exudate  Eyes:      General: No scleral icterus  Conjunctiva/sclera: Conjunctivae normal    Neck:      Musculoskeletal: Normal range of motion and neck supple  Cardiovascular:      Rate and Rhythm: Normal rate  Heart sounds: Normal heart sounds  No friction rub  Pulmonary:      Effort: No respiratory distress  Breath sounds: Normal breath sounds  No wheezing  Comments: Right chest tube  Abdominal:      General: There is no distension  Palpations: Abdomen is soft  There is no mass  Tenderness: There is no abdominal tenderness  Musculoskeletal:         General: Swelling present  Comments: Plus one edema bilateral extremities   Skin:     General: Skin is warm  Coloration: Skin is not jaundiced  Neurological:      General: No focal deficit present  Mental Status: He is alert and oriented to person, place, and time  Psychiatric:         Mood and Affect: Mood normal          Behavior: Behavior normal              Review of Systems   Constitutional: Negative for chills, fatigue and fever  HENT: Negative for congestion      Respiratory: Negative for cough, shortness of breath and wheezing  Cardiovascular: Positive for leg swelling  Gastrointestinal: Negative for abdominal pain, constipation, diarrhea, nausea and vomiting  Genitourinary: Negative for dysuria  Musculoskeletal: Negative for back pain  Skin: Negative for pallor  Neurological: Negative for headaches  Psychiatric/Behavioral: Negative for agitation  All other systems reviewed and are negative  Scheduled Meds:  Current Facility-Administered Medications   Medication Dose Route Frequency Provider Last Rate    acetaminophen  650 mg Oral Q6H PRN Caitlin Ramirez MD      bisacodyl  5 mg Oral Daily PRN Norman Soto PA-C      calcium carbonate  1,000 mg Oral Daily PRN Caitlin Ramirez MD      carvedilol  6 25 mg Oral BID With Meals David Tim PA-C      ergocalciferol  50,000 Units Oral Weekly Caitlin Ramirez MD      furosemide  80 mg Oral BID (diuretic) Caitlin Ramirez MD      HYDROmorphone  0 2 mg Intravenous Q4H PRN Mame Webb MD      metoprolol  2 5 mg Intravenous Q6H PRN David Tim PA-C      midodrine  10 mg Oral TID AC Gi An MD      nicotine  1 patch Transdermal Daily Caitlin Ramirez MD      ondansetron  4 mg Intravenous Q6H PRN Caitlin Ramirez MD      oxyCODONE  2 5 mg Oral Q4H PRN Mame Webb MD      oxyCODONE  5 mg Oral Q4H PRN Mame Webb MD      polyethylene glycol  17 g Oral Daily PRN Caitlin Ramirez MD         PRN Meds:   acetaminophen    bisacodyl    calcium carbonate    HYDROmorphone    metoprolol    ondansetron    oxyCODONE    oxyCODONE    polyethylene glycol    Continuous Infusions:       Invasive Devices:      Invasive Devices     Peripheral Intravenous Line            Peripheral IV 05/26/21 Left;Upper;Ventral (anterior) Arm 2 days          Drain            Chest Tube Right 12 Fr  1 day                  LABORATORY:    Results from last 7 days   Lab Units 05/29/21  0444 05/28/21  0501 05/27/21  1708 05/27/21  0155 05/26/21  1055 05/26/21  0429 05/25/21  1808 05/25/21  0520 05/24/21  0728  05/23/21  0620   WBC Thousand/uL 5 11 4 71  --  6 38  --  6 00  --  5 01 4 39  --  4 30*   HEMOGLOBIN g/dL 7 8* 8 0* 8 1* 7 1* 7 5* 7 6* 7 1* 6 8* 7 7*   < > 7 1*   HEMATOCRIT % 25 1* 25 4* 25 9* 22 7* 23 5* 24 6*  --  21 7* 25 0*   < > 22 6*   PLATELETS Thousands/uL 158 146*  --  164  --  191  --  166 173  --  149   POTASSIUM mmol/L 3 9 4 2  --  4 6  --  4 8  --  4 9 4 3  --  4 4   CHLORIDE mmol/L 98* 98*  --  97*  --  96*  --  97* 97*  --  96*   CO2 mmol/L 32 30  --  33*  --  28  --  30 30  --  29   BUN mg/dL 52* 65*  --  79*  --  85*  --  85* 80*  --  82*   CREATININE mg/dL 1 51* 1 70*  --  1 86*  --  2 08*  --  2 31* 2 36*  --  2 43*   CALCIUM mg/dL 9 0 9 2  --  9 1  --  9 1  --  9 2 9 4  --  8 8   MAGNESIUM mg/dL  --   --   --  2 7*  --   --   --   --   --   --   --     < > = values in this interval not displayed  rest all reviewed    RADIOLOGY:  IR chest tube check/change/reposition/upsize   Final Result by Liv Reeves MD (05/27 1637)   Impression:   1  Successful placement of a 12 Spanish right chest tube under ultrasound and fluoroscopic guidance  Workstation performed: [id number]           Rest all reviewed    Portions of the record may have been created with voice recognition software  Occasional wrong word or "sound a like" substitutions may have occurred due to the inherent limitations of voice recognition software  Read the chart carefully and recognize, using context, where substitutions have occurred  If you have any questions, please contact the dictating provider

## 2021-05-30 NOTE — PROGRESS NOTES
Progress Note - Thoracic Surgery   Troy Ko 57 y o  male MRN: [id number]  Unit/Bed#: [facility] 422-01 Encounter: [id number]    Assessment:  58 y/o M p/w R pleural effusion, s/p IR 8Fr CT placement on 5/22 (now removed), new R 12Fr CT placement by IR on 5/27    R CT to -20 sxn with 910 cc dark ss from 1375 cc of ss output, -AL    Plan:  -consider additional tPA/DNase vs repeat CT chest without contrast today vs tomorrow  -continue CT to suction  -IS/aggressive pulmonary toilet    Subjective/Objective     Subjective:     Patient seen and examined at bedside  No acute events overnight  C/o pain at chest tube site worse with deep breaths  Denies nausea, vomiting, fever, chills  OOB to chair       Objective:     Blood pressure 107/68, pulse 98, temperature 98 4 °F (36 9 °C), resp  rate 18, weight 131 kg (288 lb 9 3 oz), SpO2 97 %  ,Body mass index is 40 25 kg/m²        Intake/Output Summary (Last 24 hours) at 5/30/2021 8479  Last data filed at 5/30/2021 0601  Gross per 24 hour   Intake 462 ml   Output 2545 ml   Net -2083 ml       Invasive Devices     Peripheral Intravenous Line            Peripheral IV 05/26/21 Left;Upper;Ventral (anterior) Arm 3 days          Drain            Chest Tube Right 12 Fr  2 days                Physical Exam:   General: NAD  Head: normocephalic, atraumatic  CV: Pulse regular  Lungs: no conversational dyspnea,  R CT to -20 sxn with ss output, -AL  Abdomen: soft, non distended, non tender, no guarding or rebound  Extremities: TAYLOR, motor sensory intact  Neuro: awake, alert, answers questions appropriately      Lab, Imaging and other studies:  CBC:   Lab Results   Component Value Date    WBC 4 60 05/30/2021    HGB 8 0 (L) 05/30/2021    HCT 25 6 (L) 05/30/2021    MCV 96 05/30/2021     05/30/2021    MCH 30 0 05/30/2021    MCHC 31 3 (L) 05/30/2021    RDW 16 6 (H) 05/30/2021    MPV 10 4 05/30/2021   , CMP:   Lab Results   Component Value Date    SODIUM 132 (L) 05/30/2021    K 4 2 05/30/2021 CL 99 (L) 05/30/2021    CO2 27 05/30/2021    BUN 47 (H) 05/30/2021    CREATININE 1 57 (H) 05/30/2021    CALCIUM 9 0 05/30/2021    EGFR 47 05/30/2021   , Coagulation: No results found for: PT, INR, APTT  VTE Pharmacologic Prophylaxis: Sequential compression device (Venodyne)   VTE Mechanical Prophylaxis: sequential compression device

## 2021-05-30 NOTE — PROGRESS NOTES
INTERNAL MEDICINE RESIDENCY PROGRESS NOTE     Name: Natividad Estrada   Age & Sex: 57 y o  male   MRN: [id number]  Unit/Bed#: [address] 422-01   Encounter: [id number]  Team: SOD Team C     PATIENT INFORMATION     Name: Natividad Estrada   Age & Sex: 57 y o  male   MRN: [id number]  Hospital Stay Days: 4    ASSESSMENT/PLAN     Principal Problem:    Pleural effusion on right  Active Problems:    Atrial fibrillation (HCC)    Chronic diastolic (congestive) heart failure (HCC)    Type 2 diabetes mellitus with diabetic chronic kidney disease (HCC)    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 ascites (HCC)    CKD (chronic kidney disease) stage 3, GFR 30-59 ml/min (HCC)    Chronic anemia    Acute kidney injury superimposed on CKD (HCC)    Hypotension      Hypotension  Assessment & Plan  · In the setting of output from chest tube plus Afib/CHF medications (Coreg 6 25 BID, Lasix 80mg BID)  · Midodrine increased to 10mg TID added by nephrology  · Can consider decreasing Coreg to 3 125mg BID, however, pt is currently tachycardic to 130s on exam    Acute kidney injury superimposed on CKD [facility])  Assessment & Plan  · Likely secondary to episodes of hypotension with anemia vs cardiorenal  · Nephrology following, appreciate reocmmendations  · Baseline Cr 1 5-1 8, admitted with Cr of 2 8 on 5/21/2021  · Cr improved to baseline of 1 5  · Will continue with home med PO Lasix 80mg BID  · Continue holding home med Lisinopril 5mg for now  · Ultimately will require outpatient follow up with pt's nephrologist Dr Scott Reyna  · With baseline hemoglobin approximately 9 5   · Now acute on chronic d/t blood loss  · Patient received 1 unit of PRBC on 05/25/2021 due to bloody chest tube drainage and hematoma  · Stool occult neg  · Given another 1 U PRBC on 5/27/2021  · HG from 8 0 yesterday to 7 8 today  · Continue to monitor H&H    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 ascites ([facility] )  Assessment & Plan  · [facility] outpatient GI on 04/08 [facility]  [facility]ening is up-to-date   · Last EGD did not show any esophageal varices   · Suspect fatty liver vs cardiac in nature    Type 2 diabetes mellitus with diabetic chronic kidney disease [facility])  Assessment & Plan  Lab Results   Component Value Date    HGBA1C 5 5 05/21/2021     · Controlled hemoglobin A1c  Not on any home meds      Chronic diastolic (congestive) heart failure (HCC)  Assessment & Plan  Wt Readings from Last 3 Encounters:   05/27/21 132 kg (291 lb 7 2 oz)   05/26/21 131 kg (288 lb 12 8 oz)   05/22/21 (!) 136 kg (300 lb 0 7 oz)     · Patient appears slightly hypervolemic with LE edema  · Followed by Dr Tk Garzon OP  · Home regimen: lasix 80 mg BID, metolazone 5 mg twice a week  · Continue Lasix 80 milligrams b i d -patient has been unable to receive this given low blood pressure  · Weight has remained stable   · Continue intake and output and daily weights      Atrial fibrillation ([facility] )  Assessment & Plan  · HR elevated due to resp status  · On Coreg 6 25mg BID with BP on low side  · Chronic anticoagulation with Coumadin 5 milligrams Sunday, Tuesday, Wednesday, Friday, Saturday and 2 5 milligrams Monday and Thursday  · Coumadin currently on hold given anemia and bloody output from chest tube    * Pleural effusion on right  Assessment & Plan  · Patient transferred to [place] from [facility] for thoracic surgery evaluation  · History of recurrent right-sided pleural effusion s/p thoracentesis on 05/22, chest tube placed with continued output of bloody fluid  · Fluid-exudative but negative for malignancy, cultures negative  · Repeat chest x-ray on 05/24 with continued effusion  · CT scan performed on 05/26 revealed pleural effusion improvement with new hyperdensities consistent with lung hematomas  · Consulted thoracic surgery for evaluation  · Management per thoracic surgery  · S/p tpa/DNAse yesterday with good output  · Likely need CT scan of the chest today  · Cont monitoring the output  · IS  · Pain control  · Bowel regimen in place        Disposition: cont care  SUBJECTIVE     Patient seen and examined  No acute events overnight  Patient denies any complaints  OBJECTIVE     Vitals:    21 0318 21 0532 21 0705 21 0925   BP: 107/68  (!) 102/49 98/55   Pulse: 98  (!) 118 100   Resp: 18      Temp:   98 3 °F (36 8 °C)    TempSrc:       SpO2: 97%  98% 98%   Weight:  131 kg (288 lb 9 3 oz)        Temperature:   Temp (24hrs), Av 6 °F (37 °C), Min:98 3 °F (36 8 °C), Max:99 2 °F (37 3 °C)    Temperature: 98 3 °F (36 8 °C)  Intake & Output:  I/O        07 -  0700  07 -  0700  07 -  0700    P  O  1060 462 240    Total Intake(mL/kg) 1060 (8 1) 462 (3 5) 240 (1 8)    Urine (mL/kg/hr) 3015 (1) 1325 (0 4)     Chest Tube 1560 1220     Total Output 4575 2545     Net -5075 -4296 +240               Weights:        Body mass index is 40 25 kg/m²  Weight (last 2 days)     Date/Time   Weight    21 0532   131 (288 58)    21 0600   131 (289 02)    21 0546   132 (290 35)            Physical Exam  Constitutional:       Appearance: Normal appearance  HENT:      Head: Normocephalic  Eyes:      Conjunctiva/sclera: Conjunctivae normal    Cardiovascular:      Rate and Rhythm: Normal rate  Pulmonary:      Effort: Pulmonary effort is normal    Neurological:      General: No focal deficit present  Mental Status: He is alert  LABORATORY DATA     Labs: I have personally reviewed pertinent reports  Results from last 7 days   Lab Units 21  0529 21  0444 21  0501  21  0429   WBC Thousand/uL 4 60 5 11 4 71   < > 6 00   HEMOGLOBIN g/dL 8 0* 7 8* 8 0*   < > 7 6*   HEMATOCRIT % 25 6* 25 1* 25 4*   < > 24 6*   PLATELETS Thousands/uL 177 158 146*   < > 191   NEUTROS PCT %  --   --  74  --  74   MONOS PCT %  --   --  10  --  9    < > = values in this interval not displayed        Results from last 7 days   Lab Units 05/30/21  0529 05/29/21  0444 05/28/21  0501   POTASSIUM mmol/L 4 2 3 9 4 2   CHLORIDE mmol/L 99* 98* 98*   CO2 mmol/L 27 32 30   BUN mg/dL 47* 52* 65*   CREATININE mg/dL 1 57* 1 51* 1 70*   CALCIUM mg/dL 9 0 9 0 9 2     Results from last 7 days   Lab Units 05/29/21  0444 05/27/21  0155   MAGNESIUM mg/dL 2 7* 2 7*          Results from last 7 days   Lab Units 05/27/21  1209 05/24/21  1159   INR  1 36* 1 44*               IMAGING & DIAGNOSTIC TESTING     Radiology Results: I have personally reviewed pertinent reports  Ct Chest Wo Contrast    Result Date: 5/26/2021  Impression: Improvement in the prior pleural effusion with new hyperdensities consistent with hematomas with pigtail catheter in place  Atelectasis of the middle lobe and right lower lobe unchanged  Workstation performed: [id number]     Ir Chest Tube Check/change/reposition/upsize    Result Date: 5/27/2021  Impression: Impression: 1  Successful placement of a 12 Welsh right chest tube under ultrasound and fluoroscopic guidance  Workstation performed: [id number]     Other Diagnostic Testing: I have personally reviewed pertinent reports      ACTIVE MEDICATIONS     Current Facility-Administered Medications   Medication Dose Route Frequency    acetaminophen (TYLENOL) tablet 650 mg  650 mg Oral Q6H PRN    calcium carbonate (TUMS) chewable tablet 1,000 mg  1,000 mg Oral Daily PRN    carvedilol (COREG) tablet 6 25 mg  6 25 mg Oral BID With Meals    ergocalciferol (VITAMIN D2) capsule 50,000 Units  50,000 Units Oral Weekly    furosemide (LASIX) injection 40 mg  40 mg Intravenous Once    HYDROmorphone HCl (DILAUDID) injection 0 2 mg  0 2 mg Intravenous Q4H PRN    metoprolol (LOPRESSOR) injection 2 5 mg  2 5 mg Intravenous Q6H PRN    midodrine (PROAMATINE) tablet 10 mg  10 mg Oral TID AC    nicotine (NICODERM CQ) 14 mg/24hr TD 24 hr patch 1 patch  1 patch Transdermal Daily    ondansetron (ZOFRAN) injection 4 mg  4 mg Intravenous Q6H PRN    oxyCODONE (ROXICODONE) IR tablet 2 5 mg  2 5 mg Oral Q4H PRN    oxyCODONE (ROXICODONE) IR tablet 5 mg  5 mg Oral Q4H PRN    polyethylene glycol (MIRALAX) packet 17 g  17 g Oral Daily PRN    senna-docusate sodium (SENOKOT S) 8 6-50 mg per tablet 1 tablet  1 tablet Oral HS       VTE Pharmacologic Prophylaxis: Reason for no pharmacologic prophylaxis bleeding  VTE Mechanical Prophylaxis: sequential compression device    Portions of the record may have been created with voice recognition software  Occasional wrong word or "sound a like" substitutions may have occurred due to the inherent limitations of voice recognition software    Read the chart carefully and recognize, using context, where substitutions have occurred   ==  Marcin Young, [facility]  Internal Medicine Residency PGY-3

## 2021-05-30 NOTE — PROGRESS NOTES
Follow up Consultation    Nephrology   Marissa Crowley 57 y o  male MRN: [id number]  Unit/Bed#: [address] 422-01 Encounter: [id number]      Physician Requesting Consult: Maryanne Anderson DO        ASSESSMENT/PLAN:  58-year-old male with multiple comorbidities including morbid obesity, CHF, AFib and hypertension transferred in from the [facility] custod[facility] for thoracic surgery evaluation   Acute kidney injury (POA) on CKD stage 3:  - MARISABEL most likely secondary to failure to auto regulate in presence of hemodynamic perturbations with hypotension plus anemia plus cardiorenal syndrome plus Ace inhibitor use with subsequent ATN  - After review of records In [facility] as well as Care everywhere it appears that the patient has a baseline Creatinine of at 1 5-1 8 mg/dL  - patient was admitted with a creatinine of 2 80 mg/dL on 05/21/2021   - patient's creatinine today is at 1 57 mg/dL, stable and and at baseline   - will give Lasix 40 mg IV x1 today  - check BMP in a m   - Await renal recovery  - Optimize hemodynamic status to avoid delay in renal recovery  - Place on a renal diet when allowed diet order    - Avoid nephrotoxins, adjust meds to appropriate GFR   - Strict I/O   - Daily weights  - Urinary retention protocol if patient does not have a Gann  - Most likely has underlying CKD secondary to underlying liver disease plus age-related nephron loss plus obesity related hyper filtration  - will need to set up patient for follow up with Nephrology as an outpatient post hospitalization   - for nephrology as an outpatient patient follows up with Dr Efrain Castillo Blood pressure:  - current medications:  Coreg 6 25 mg p o  B i d , midodrine 10 mg p o  T i d   - recommendations:  Recommend see if okay with Cardiology to decrease Coreg to 3 125 mg p o  B i d  will give Lasix 40 mg IV x1 today  - Optimize hemodynamics   - Maintain MAP > 65mmHg  - Avoid BP fluctuations       H/H/anemia:  - most recent hemoglobin at 8 0 grams/deciliter  - maintain hemoglobin greater than 8 grams/deciliter  - management primary team      Acid-base electrolytes:  o Hyponatremia:    - Most recent sodium at 132 mEq  - If sodium continues to drop will consider dose of tolvaptan 7 5 mg p o  X1  - Remains on nicotine    o Hypermagnesemia:   - Most recent magnesium at 2 7 continue to monitor    o  Acid-base:    - Most recent bicarb at 27     Volume status:  - Clinically appears to be minimally hypervolemic  Will give Lasix 40 mg IV x1 today      Proteinuria:   o Most recent UA with no protein as of May 2021     Other medical problems:  o Diabetes:  Management per primary team  Most recent A1c 5 5% as of May 2021  o Liver cirrhosis:  Management primary team  o CHF:  Management per primary team   At home is on Lasix 80 mg p o  B i d  And metolazone 5 mg p o  Twice a week  Follow-up with cardiology  o Right pleural effusion:  Follow-up with CT surgery and Pulmonary for possible VATS  Status post tPA/DNase on  and repeat on   For CT chest today without contrast follow-up with thoracic X  Thanks for the consult  Will continue to follow  Please call with questions/ concerns  Above-mentioned orders and Plan in terms of acute kidney injury was discussed with the team in [address] Mel[address] MD, [facility], 2021, 6:09 AM              Objective :   Patient seen and examined earlier this a m  Blood pressure slightly improved urine output 1 3 L chest tube output 1 2 L  PHYSICAL EXAM  /68   Pulse 98   Temp 98 4 °F (36 9 °C)   Resp 18   Wt 131 kg (288 lb 9 3 oz)   SpO2 97%   BMI 40 25 kg/m²   Temp (24hrs), Av 7 °F (37 1 °C), Min:98 4 °F (36 9 °C), Max:99 2 °F (37 3 °C)        Intake/Output Summary (Last 24 hours) at 2021 0609  Last data filed at 2021 2241  Gross per 24 hour   Intake 462 ml   Output 1960 ml   Net -1498 ml       I/O last 24 hours: In: 462 [P O :462]  Out: 7817 [Urine:3165;  Chest Tube:1280]      Current Weight: Weight - Scale: 131 kg (288 lb 9 3 oz)  First Weight: Weight - Scale: 132 kg (291 lb 7 2 oz)  Physical Exam  Vitals signs and nursing note reviewed  Constitutional:       General: He is not in acute distress  Appearance: Normal appearance  He is obese  He is not ill-appearing, toxic-appearing or diaphoretic  HENT:      Head: Normocephalic and atraumatic  Mouth/Throat:      Mouth: Mucous membranes are moist       Pharynx: Oropharynx is clear  No oropharyngeal exudate  Eyes:      General: No scleral icterus  Conjunctiva/sclera: Conjunctivae normal    Neck:      Musculoskeletal: Normal range of motion and neck supple  Cardiovascular:      Rate and Rhythm: Normal rate  Heart sounds: Normal heart sounds  No friction rub  Pulmonary:      Effort: Pulmonary effort is normal  No respiratory distress  Breath sounds: No wheezing  Comments: Right chest tube decreased breath sounds on the right  Abdominal:      General: There is no distension  Palpations: Abdomen is soft  There is no mass  Tenderness: There is no abdominal tenderness  Musculoskeletal:         General: Swelling present  Comments: Plus one edema bilateral lower extremities   Skin:     General: Skin is warm  Coloration: Skin is not jaundiced  Neurological:      General: No focal deficit present  Mental Status: He is alert and oriented to person, place, and time  Psychiatric:         Mood and Affect: Mood normal          Behavior: Behavior normal              Review of Systems   Constitutional: Negative for appetite change, chills and fatigue  HENT: Negative for congestion  Respiratory: Negative for cough, shortness of breath and wheezing  Cardiovascular: Positive for leg swelling  Gastrointestinal: Negative for abdominal pain, constipation, diarrhea, nausea and vomiting  Genitourinary: Negative for dysuria  Musculoskeletal: Negative for back pain  Skin: Negative for pallor  Neurological: Negative for dizziness and headaches  Psychiatric/Behavioral: Negative for agitation and confusion  All other systems reviewed and are negative  Scheduled Meds:  Current Facility-Administered Medications   Medication Dose Route Frequency Provider Last Rate    acetaminophen  650 mg Oral Q6H PRN Dhruv Ware MD      calcium carbonate  1,000 mg Oral Daily PRN Dhruv Ware MD      carvedilol  6 25 mg Oral BID With Meals David Tim PA-C      ergocalciferol  50,000 Units Oral Weekly Dhruv Ware MD      HYDROmorphone  0 2 mg Intravenous Q4H PRN Becky Marr MD      metoprolol  2 5 mg Intravenous Q6H PRN David Tim PA-C      midodrine  10 mg Oral TID SARA Jo MD      nicotine  1 patch Transdermal Daily Dhruv Ware MD      ondansetron  4 mg Intravenous Q6H PRN Dhruv Ware MD      oxyCODONE  2 5 mg Oral Q4H PRN Becky Marr MD      oxyCODONE  5 mg Oral Q4H PRN Becky Marr MD      polyethylene glycol  17 g Oral Daily PRN Dhruv Ware MD      senna-docusate sodium  1 tablet Oral HS Becky Marr MD         PRN Meds:   acetaminophen    calcium carbonate    HYDROmorphone    metoprolol    ondansetron    oxyCODONE    oxyCODONE    polyethylene glycol    Continuous Infusions:       Invasive Devices:      Invasive Devices     Peripheral Intravenous Line            Peripheral IV 05/26/21 Left;Upper;Ventral (anterior) Arm 3 days          Drain            Chest Tube Right 12 Fr  2 days                  LABORATORY:    Results from last 7 days   Lab Units 05/30/21  0529 05/29/21  0444 05/28/21  0501 05/27/21  1708 05/27/21  0155 05/26/21  1055 05/26/21  0429  05/25/21  0520 05/24/21  0728   WBC Thousand/uL 4 60 5 11 4 71  --  6 38  --  6 00  --  5 01 4 39   HEMOGLOBIN g/dL 8 0* 7 8* 8 0* 8 1* 7 1* 7 5* 7 6*   < > 6 8* 7 7*   HEMATOCRIT % 25 6* 25 1* 25 4* 25 9* 22 7* 23 5* 24 6*  --  21 7* 25 0*   PLATELETS Thousands/uL 177 158 146*  --  164  --  191  --  166 173   POTASSIUM mmol/L 4 2 3 9 4 2  --  4 6  --  4 8  --  4 9 4 3   CHLORIDE mmol/L 99* 98* 98*  --  97*  --  96*  --  97* 97*   CO2 mmol/L 27 32 30  --  33*  --  28  --  30 30   BUN mg/dL 47* 52* 65*  --  79*  --  85*  --  85* 80*   CREATININE mg/dL 1 57* 1 51* 1 70*  --  1 86*  --  2 08*  --  2 31* 2 36*   CALCIUM mg/dL 9 0 9 0 9 2  --  9 1  --  9 1  --  9 2 9 4   MAGNESIUM mg/dL  --  2 7*  --   --  2 7*  --   --   --   --   --     < > = values in this interval not displayed  rest all reviewed    RADIOLOGY:  IR chest tube check/change/reposition/upsize   Final Result by Aime Wellington MD (05/27 4302)   Impression:   1  Successful placement of a 12 [place] right chest tube under ultrasound and fluoroscopic guidance  Workstation performed: [id number]           Rest all reviewed    Portions of the record may have been created with voice recognition software  Occasional wrong word or "sound a like" substitutions may have occurred due to the inherent limitations of voice recognition software  Read the chart carefully and recognize, using context, where substitutions have occurred  If you have any questions, please contact the dictating provider

## 2021-05-30 NOTE — PLAN OF CARE
Problem: Potential for Falls  Goal: Patient will remain free of falls  Description: INTERVENTIONS:  - Assess patient frequently for physical needs  -  Identify cognitive and physical deficits and behaviors that affect risk of falls    -  [place] fall precautions as indicated by assessment   - Educate patient/family on patient safety including physical limitations  - Instruct patient to call for assistance with activity based on assessment  - Modify environment to reduce risk of injury  - Consider OT/PT consult to assist with strengthening/mobility  Outcome: Progressing     Problem: PAIN - ADULT  Goal: Verbalizes/displays adequate comfort level or baseline comfort level  Description: Interventions:  - Encourage patient to monitor pain and request assistance  - Assess pain using appropriate pain scale  - Administer analgesics based on type and severity of pain and evaluate response  - Implement non-pharmacological measures as appropriate and evaluate response  - Consider cultural and social influences on pain and pain management  - Notify physician/advanced practitioner if interventions unsuccessful or patient reports new pain  Outcome: Progressing     Problem: INFECTION - ADULT  Goal: Absence or prevention of progression during hospitalization  Description: INTERVENTIONS:  - Assess and monitor for signs and symptoms of infection  - Monitor lab/diagnostic results  - Monitor all insertion sites, i e  indwelling lines, tubes, and drains  - Monitor endotracheal if appropriate and nasal secretions for changes in amount and color  - [place] appropriate cooling/warming therapies per order  - Administer medications as ordered  - Instruct and encourage patient and family to use good hand hygiene technique  - Identify and instruct in appropriate isolation precautions for identified infection/condition  Outcome: Progressing  Goal: Absence of fever/infection during neutropenic period  Description: INTERVENTIONS:  - Monitor WBC    Outcome: Progressing     Problem: SAFETY ADULT  Goal: Patient will remain free of falls  Description: INTERVENTIONS:  - Assess patient frequently for physical needs  -  Identify cognitive and physical deficits and behaviors that affect risk of falls    -  [place] fall precautions as indicated by assessment   - Educate patient/family on patient safety including physical limitations  - Instruct patient to call for assistance with activity based on assessment  - Modify environment to reduce risk of injury  - Consider OT/PT consult to assist with strengthening/mobility  Outcome: Progressing  Goal: Maintain or return to baseline ADL function  Description: INTERVENTIONS:  -  Assess patient's ability to carry out ADLs; assess patient's baseline for ADL function and identify physical deficits which impact ability to perform ADLs (bathing, care of mouth/teeth, toileting, grooming, dressing, etc )  - Assess/evaluate cause of self-care deficits   - Assess range of motion  - Assess patient's mobility; develop plan if impaired  - Assess patient's need for assistive devices and provide as appropriate  - Encourage maximum independence but intervene and supervise when necessary  - Involve family in performance of ADLs  - Assess for home care needs following discharge   - Consider OT consult to assist with ADL evaluation and planning for discharge  - Provide patient education as appropriate  Outcome: Progressing  Goal: Maintain or return mobility status to optimal level  Description: INTERVENTIONS:  - Assess patient's baseline mobility status (ambulation, transfers, stairs, etc )    - Identify cognitive and physical deficits and behaviors that affect mobility  - Identify mobility aids required to assist with transfers and/or ambulation (gait belt, sit-to-stand, lift, walker, cane, etc )  - [place] fall precautions as indicated by assessment  - Record patient progress and toleration of activity level on Mobility SBAR; progress patient to next Phase/Stage  - Instruct patient to call for assistance with activity based on assessment  - Consider rehabilitation consult to assist with strengthening/weightbearing, etc   Outcome: Progressing     Problem: DISCHARGE PLANNING  Goal: Discharge to home or other facility with appropriate resources  Description: INTERVENTIONS:  - Identify barriers to discharge w/patient and caregiver  - Arrange for needed discharge resources and transportation as appropriate  - Identify discharge learning needs (meds, wound care, etc )  - Arrange for interpretive services to assist at discharge as needed  - Refer to Case Management Department for coordinating discharge planning if the patient needs post-hospital services based on physician/advanced practitioner order or complex needs related to functional status, cognitive ability, or social support system  Outcome: Progressing     Problem: Knowledge Deficit  Goal: Patient/family/caregiver demonstrates understanding of disease process, treatment plan, medications, and discharge instructions  Description: Complete learning assessment and assess knowledge base    Interventions:  - Provide teaching at level of understanding  - Provide teaching via preferred learning methods  Outcome: Progressing

## 2021-05-31 NOTE — QUICK NOTE
Patient's wife updated at the bedside  All questions answered      Yael Handley DO, [address]  PGY-1, Internal Medicine  [facility]

## 2021-05-31 NOTE — PROGRESS NOTES
NEPHROLOGY PROGRESS NOTE   Baltazar Flores 57 y o  male MRN: [id number]  Unit/Bed#: [facility] 422-01 Encounter: [id number]  Reason for Consult:  Acute kidney injury    ASSESSMENT/PLAN:  1  Acute kidney injury, likely multifactorial, possible component of ATN given impaired autoregulation in the face of hypotension  2  CKD stage 3 baseline creatinine 1 5-1 8  3  Hyponatremia is likely secondary to some degree of volume overload with continue with IV Lasix daily  4  Liver cirrhosis  5  Cardiomyopathy, ejection fraction 60% with likely diastolic dysfunction  6  Right pleural effusions status post chest tube placement, considering VATS  7  Chronic hypotension, continue with midodrine 3 times daily    PLAN:  · Overall renal function remains stable, within baseline creatinine  · Continue with intermittent diuretic dosing  · Continue with midodrine  · No other changes in current regimen    SUBJECTIVE:  Seen examined  Patient is sitting up in the chair  Denies any worsening shortness of breath  Unable to live flat given chest tube placement  Chronic lower extremity swelling  Weight is unchanged  Review of Systems    OBJECTIVE:  Current Weight: Weight - Scale: 131 kg (289 lb 3 9 oz)  Vitals:    05/30/21 2246 05/31/21 0336 05/31/21 0600 05/31/21 0652   BP: (!) 80/51 105/63  108/66   BP Location:  Left arm     Pulse: (!) 106 (!) 110  (!) 110   Resp:  18  20   Temp: 98 2 °F (36 8 °C) 98 4 °F (36 9 °C)  98 3 °F (36 8 °C)   TempSrc:  Oral     SpO2: 98% 97%  99%   Weight:   131 kg (289 lb 3 9 oz)        Intake/Output Summary (Last 24 hours) at 5/31/2021 1106  Last data filed at 5/31/2021 0701  Gross per 24 hour   Intake 720 ml   Output 2745 ml   Net -2025 ml       Physical Exam  Constitutional:       Appearance: He is obese  HENT:      Head: Normocephalic and atraumatic  Cardiovascular:      Rate and Rhythm: Normal rate and regular rhythm     Pulmonary:      Effort: Pulmonary effort is normal       Breath sounds: Examination of the right-middle field reveals decreased breath sounds  Examination of the right-lower field reveals decreased breath sounds  Decreased breath sounds present  Abdominal:      Palpations: Abdomen is soft  Tenderness: There is no abdominal tenderness  Musculoskeletal:      Right lower leg: Edema (2-3 +) present  Left lower leg: Edema ( 2-3 +) present  Skin:     General: Skin is warm and dry  Findings: No rash  Neurological:      Mental Status: He is alert and oriented to person, place, and time           Medications:    Current Facility-Administered Medications:     acetaminophen (TYLENOL) tablet 650 mg, 650 mg, Oral, Q6H PRN, Dhruv Ware MD, 650 mg at 05/29/21 1734    calcium carbonate (TUMS) chewable tablet 1,000 mg, 1,000 mg, Oral, Daily PRN, Dhruv Ware MD    carvedilol (COREG) tablet 6 25 mg, 6 25 mg, Oral, BID With Meals, David Tim PA-C, Stopped at 05/30/21 1700    ergocalciferol (VITAMIN D2) capsule 50,000 Units, 50,000 Units, Oral, Weekly, Dhruv Ware MD, 50,000 Units at 05/29/21 1036    HYDROmorphone HCl (DILAUDID) injection 0 2 mg, 0 2 mg, Intravenous, Q4H PRN, Becky Marr MD, 0 2 mg at 05/31/21 0654    menthol-methyl salicylate (BENGAY) 75-72 % cream, , Apply externally, 4x Daily PRN, Ash Hwang DO, Given at 05/30/21 2048    metoprolol (LOPRESSOR) injection 2 5 mg, 2 5 mg, Intravenous, Q6H PRN, David Tim PA-C    midodrine (PROAMATINE) tablet 10 mg, 10 mg, Oral, TID AC, Jose L Jo MD, 10 mg at 05/31/21 0654    nicotine (NICODERM CQ) 14 mg/24hr TD 24 hr patch 1 patch, 1 patch, Transdermal, Daily, Roldan Gates MD    ondansetron (ZOFRAN) injection 4 mg, 4 mg, Intravenous, Q6H PRN, Dhruv Ware MD    oxyCODONE (ROXICODONE) IR tablet 2 5 mg, 2 5 mg, Oral, Q4H PRN, Becky Marr MD    oxyCODONE (ROXICODONE) IR tablet 5 mg, 5 mg, Oral, Q4H PRN, Becky Marr MD, 5 mg at 05/31/21 0339    polyethylene glycol (MIRALAX) packet 17 g, 17 g, Oral, Daily PRN, Dhruv Ware MD, 17 g at 05/29/21 0209    senna-docusate sodium (SENOKOT S) 8 6-50 mg per tablet 1 tablet, 1 tablet, Oral, HS, Becky Marr MD, 1 tablet at 05/30/21 2048    Laboratory Results:  Results from last 7 days   Lab Units 05/31/21  0503 05/30/21  0529 05/29/21  0444 05/28/21  0501 05/27/21  1708 05/27/21  0155 05/26/21  1055 05/26/21  0429  05/25/21  0520   WBC Thousand/uL 5 78 4 60 5 11 4 71  --  6 38  --  6 00  --  5 01   HEMOGLOBIN g/dL 7 7* 8 0* 7 8* 8 0* 8 1* 7 1* 7 5* 7 6*   < > 6 8*   HEMATOCRIT % 24 5* 25 6* 25 1* 25 4* 25 9* 22 7* 23 5* 24 6*  --  21 7*   PLATELETS Thousands/uL 187 177 158 146*  --  164  --  191  --  166   POTASSIUM mmol/L 4 0 4 2 3 9 4 2  --  4 6  --  4 8  --  4 9   CHLORIDE mmol/L 98* 99* 98* 98*  --  97*  --  96*  --  97*   CO2 mmol/L 30 27 32 30  --  33*  --  28  --  30   BUN mg/dL 40* 47* 52* 65*  --  79*  --  85*  --  85*   CREATININE mg/dL 1 47* 1 57* 1 51* 1 70*  --  1 86*  --  2 08*  --  2 31*   CALCIUM mg/dL 9 0 9 0 9 0 9 2  --  9 1  --  9 1  --  9 2   MAGNESIUM mg/dL  --   --  2 7*  --   --  2 7*  --   --   --   --     < > = values in this interval not displayed

## 2021-05-31 NOTE — PROGRESS NOTES
INTERNAL MEDICINE RESIDENCY PROGRESS NOTE     Name: Sally Lara   Age & Sex: 57 y o  male   MRN: [id number]  Unit/Bed#: [address] 422-01   Encounter: [id number]  Team: SOD Team C     PATIENT INFORMATION     Name: Sally Lara   Age & Sex: 57 y o  male   MRN: [id number]  Hospital Stay Days: 5    ASSESSMENT/PLAN     Principal Problem:    Pleural effusion on right  Active Problems:    Acute kidney injury superimposed on CKD [facility])    Atrial fibrillation (HCC)    Chronic diastolic (congestive) heart failure (HCC)    Type 2 diabetes mellitus with diabetic chronic kidney disease (HCC)    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 ascites (HCC)    CKD (chronic kidney disease) stage 3, GFR 30-59 ml/min (HCC)    Chronic anemia    Hypotension      * Pleural effusion on right  Assessment & Plan  Patient transferred to [place] from [facility] for thoracic surgery evaluation  History of recurrent right-sided pleural effusion s/p thoracentesis on 05/22, chest tube placed with continued output of bloody fluid  Fluid-exudative but negative for malignancy, cultures negative      Plan:  · Repeat chest x-ray on 05/24 with continued effusion  · CT scan performed on 05/26 revealed pleural effusion improvement with new hyperdensities consistent with lung hematomas  · Consulted thoracic surgery for evaluation  · Management per thoracic surgery  · S/p tpa/DNAse on 5 29 with good output  · CT of the chest on 05/30 revealed a small loculated right hydropneumothorax and benign rounded atelectasis  · Plan for VATS/pleurodesis on Wednesday 6/2  · Cont monitoring the output  · IS  · Pain control  · Bowel regimen in place    Acute kidney injury superimposed on CKD [facility])  Assessment & Plan  Likely secondary to episodes of hypotension with anemia vs cardiorenal     Plan:  · Nephrology following, appreciate reocmmendations  · Baseline Cr 1 5-1 8, admitted with Cr of 2 8 on 5/21/2021  · Cr improved to baseline of 1 5, 1 47 today  · Patient started on midodrine  · Continue holding home med Lisinopril 5mg for now  · Ultimately will require outpatient follow up with pt's nephrologist Dr Peggy Nicholas    Atrial fibrillation [facility])  Assessment & Plan  HR elevated due to resp status  History of Afib  Plan:  · On Coreg 6 25mg BID, held with BP on low side  · Chronic anticoagulation with Coumadin 5 milligrams Sunday, Tuesday, Wednesday, Friday, Saturday and 2 5 milligrams Monday and Thursday  · Coumadin currently on hold given anemia and bloody output from chest tube  · Cardiology following, considering digoxin if heart rate stays above 120  · [id number]    Hypotension  Assessment & Plan  In the setting of output from chest tube plus Afib/CHF medications (Coreg 6 25 BID, Lasix 80mg BID)    Plan:  · Midodrine increased to 10mg TID added by nephrology  · Can consider decreasing Coreg to 3 125mg BID, however, pt is currently tachycardic to 130s on exam    Chronic anemia  Assessment & Plan  With baseline hemoglobin approximately 9 5  Now acute on chronic d/t blood loss  Plan:  · Patient received 1 unit of PRBC on 05/25/2021 due to bloody chest tube drainage and hematoma  · Stool occult neg  · Given another 1 U PRBC on 5/27/2021  · Additional unit of packed red blood cells given on 05/31  · Hemoglobin 7 7 today, down from 8  · Continue to monitor H&H    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 ascites [facility])  Assessment & Plan  Last seen by outpatient GI on 04/08  [facility] [address]  screening is up-to-date  Last EGD did not show any esophageal varices  Plan:  · Suspect fatty liver vs cardiac in nature  · MELD of 18, Child Malloy class B  · GI now on board, plan for transjugular liver biopsy along with portal pressure measurements by IR    Type 2 diabetes mellitus with diabetic chronic kidney disease [facility])  Assessment & Plan  Lab Results   Component Value Date    HGBA1C 5 5 05/21/2021     Plan:   · Controlled hemoglobin A1c  Not on any home meds      Chronic diastolic (congestive) heart failure [facility])  Assessment & Plan  Wt Readings from Last 3 Encounters:   21 132 kg (291 lb 7 2 oz)   21 131 kg (288 lb 12 8 oz)   21 (!) 136 kg (300 lb 0 7 oz)     Patient appears slightly hypervolemic with LE edema  Followed by Dr Nisha Rodríguez OP  Home regimen: lasix 80 mg BID, metolazone 5 mg twice a week  Plan:  · Continue Lasix 80 milligrams b i d -patient has been unable to receive this given low blood pressure  · Weight has remained stable   · Continue intake and output and daily weights        Disposition:  Plan for VATS/pleurodesis on     SUBJECTIVE     Patient seen and examined  No acute events overnight  Patient was sitting up in his chair in mild distress secondary to his wrist pain  States that he slept on it wrong  Chest tube still draining serosanguineous fluid  Surgery planning on VATS/pleurodesis on Wednesday  Denies fevers, chills, night sweats, dysphagia, chest pain, shortness breath, abdominal pain, nausea, vomiting, and diarrhea  Patient has baseline lymphedema  OBJECTIVE     Vitals:    21 0336 21 0600 21 0652 21 0900   BP: 105/63  108/66 109/67   BP Location: Left arm      Pulse: (!) 110  (!) 110    Resp: 18  20    Temp: 98 4 °F (36 9 °C)  98 3 °F (36 8 °C)    TempSrc: Oral      SpO2: 97%  99%    Weight:  131 kg (289 lb 3 9 oz)        Temperature:   Temp (24hrs), Av 3 °F (36 8 °C), Min:98 2 °F (36 8 °C), Max:98 4 °F (36 9 °C)    Temperature: 98 3 °F (36 8 °C)  Intake & Output:  I/O       701 -  07 -  07 -  0700    P  O  462 960     Total Intake(mL/kg) 462 (3 5) 960 (7 3)     Urine (mL/kg/hr) 1325 (0 4) 2375 (0 8)     Chest Tube 1220 310 60    Total Output 2545 2685 60    Net - -1725 -60               Weights:        Body mass index is 40 34 kg/m²    Weight (last 2 days)     Date/Time   Weight    21 0600   131 (289 24)    21 1900   131 (288 8)    21 0532   131 (288 58) 05/29/21 0600   131 (289 02)            Physical Exam  Constitutional:       Appearance: He is obese  HENT:      Head: Normocephalic and atraumatic  Right Ear: External ear normal       Left Ear: External ear normal       Nose: Nose normal       Mouth/Throat:      Mouth: Mucous membranes are moist       Pharynx: Oropharynx is clear  Eyes:      Extraocular Movements: Extraocular movements intact  Pupils: Pupils are equal, round, and reactive to light  Cardiovascular:      Rate and Rhythm: Tachycardia present  Rhythm irregular  Pulmonary:      Effort: Pulmonary effort is normal       Comments: Decreased breath sounds on the right  Abdominal:      General: Bowel sounds are normal  There is no distension  Palpations: Abdomen is soft  Musculoskeletal:         General: Swelling (Right wrist with decreased range of motion) present  Right lower leg: Edema present  Left lower leg: Edema present  Skin:     General: Skin is warm and dry  Capillary Refill: Capillary refill takes less than 2 seconds  Neurological:      General: No focal deficit present  Mental Status: He is alert and oriented to person, place, and time  Psychiatric:         Mood and Affect: Mood normal          Behavior: Behavior normal        LABORATORY DATA     Labs: I have personally reviewed pertinent reports  Results from last 7 days   Lab Units 05/31/21  0503 05/30/21  0529 05/29/21  0444 05/28/21  0501  05/26/21  0429   WBC Thousand/uL 5 78 4 60 5 11 4 71   < > 6 00   HEMOGLOBIN g/dL 7 7* 8 0* 7 8* 8 0*   < > 7 6*   HEMATOCRIT % 24 5* 25 6* 25 1* 25 4*   < > 24 6*   PLATELETS Thousands/uL 187 177 158 146*   < > 191   NEUTROS PCT % 79*  --   --  74  --  74   MONOS PCT % 9  --   --  10  --  9    < > = values in this interval not displayed        Results from last 7 days   Lab Units 05/31/21  0503 05/30/21  0529 05/29/21  0444   POTASSIUM mmol/L 4 0 4 2 3 9   CHLORIDE mmol/L 98* 99* 98*   CO2 mmol/L 30 27 32 BUN mg/dL 40* 47* 52*   CREATININE mg/dL 1 47* 1 57* 1 51*   CALCIUM mg/dL 9 0 9 0 9 0   ALK PHOS U/L 190*  --   --    ALT U/L 18  --   --    AST U/L 19  --   --      Results from last 7 days   Lab Units 05/29/21  0444 05/27/21  0155   MAGNESIUM mg/dL 2 7* 2 7*          Results from last 7 days   Lab Units 05/27/21  1209   INR  1 36*               IMAGING & DIAGNOSTIC TESTING     Radiology Results: I have personally reviewed pertinent reports  Ct Chest Wo Contrast    Result Date: 5/30/2021  Impression: Pigtail catheter in right posterior costophrenic sulcus with small loculated right hydropneumothorax, possibly ex vacuo  Masslike opacities in the right middle and right lower lobe due to benign rounded atelectasis  Healed/healing fractures of the anterior right 3rd through 7th ribs, visible on 5/26/2021, new since 2/22/2021  Correlate with recent trauma  Pulmonary artery enlargement which can be seen with pulmonary hypertension  Workstation performed: [id number]     Ct Chest Wo Contrast    Result Date: 5/26/2021  Impression: Improvement in the prior pleural effusion with new hyperdensities consistent with hematomas with pigtail catheter in place  Atelectasis of the middle lobe and right lower lobe unchanged  Workstation performed: [id number]     Ir Chest Tube Check/change/reposition/upsize    Result Date: 5/27/2021  Impression: Impression: 1  Successful placement of a 12 [place] right chest tube under ultrasound and fluoroscopic guidance  Workstation performed: [id number]     Other Diagnostic Testing: I have personally reviewed pertinent reports      ACTIVE MEDICATIONS     Current Facility-Administered Medications   Medication Dose Route Frequency    acetaminophen (TYLENOL) tablet 650 mg  650 mg Oral Q6H PRN    calcium carbonate (TUMS) chewable tablet 1,000 mg  1,000 mg Oral Daily PRN    carvedilol (COREG) tablet 6 25 mg  6 25 mg Oral BID With Meals    ergocalciferol (VITAMIN D2) capsule 50,000 Units  50,000 Units Oral Weekly    HYDROmorphone HCl (DILAUDID) injection 0 2 mg  0 2 mg Intravenous Q4H PRN    menthol-methyl salicylate (BENGAY) 05-80 % cream   Apply externally 4x Daily PRN    metoprolol (LOPRESSOR) injection 2 5 mg  2 5 mg Intravenous Q6H PRN    midodrine (PROAMATINE) tablet 10 mg  10 mg Oral TID AC    nicotine (NICODERM CQ) 14 mg/24hr TD 24 hr patch 1 patch  1 patch Transdermal Daily    ondansetron (ZOFRAN) injection 4 mg  4 mg Intravenous Q6H PRN    oxyCODONE (ROXICODONE) IR tablet 2 5 mg  2 5 mg Oral Q4H PRN    oxyCODONE (ROXICODONE) IR tablet 5 mg  5 mg Oral Q4H PRN    polyethylene glycol (MIRALAX) packet 17 g  17 g Oral Daily PRN    senna-docusate sodium (SENOKOT S) 8 6-50 mg per tablet 1 tablet  1 tablet Oral HS       VTE Pharmacologic Prophylaxis: Reason for no pharmacologic prophylaxis hemothorax  VTE Mechanical Prophylaxis: sequential compression device    Portions of the record may have been created with voice recognition software  Occasional wrong word or "sound a like" substitutions may have occurred due to the inherent limitations of voice recognition software    Read the chart carefully and recognize, using context, where substitutions have occurred   ==  Georgi Mcclelland, [address] [place] [address]  Internal Medicine Residency PGY-1

## 2021-05-31 NOTE — PROGRESS NOTES
Progress Note - Thoracic Surgery   Yuniel Estimable 57 y o  male MRN: [id number]  Unit/Bed#: [facility] 422-01 Encounter: [id number]    Assessment:  58 y/o M p/w R pleural effusion, s/p IR 8Fr CT placement on 5/22 (now removed), new R 12Fr CT placement by IR on 5/27    R CT to -20 sxn with 310 cc dark ss from 1220 cc of ss output, -AL  On room air   IS 1000    Plan:  -consider additional tPA/DNase vs VATs, will likely need VATS since lung is not expanded after tpa x 2  -continue CT to suction at -20  -IS/aggressive pulmonary toilet  --Management of right wrist pain per primary team    Subjective/Objective     Subjective:     Patient seen and examined at bedside  No acute events overnight  C/o pain at chest tube site worse with deep breaths  C/o right wrist pain which started suddenly overnight  Denies nausea, vomiting, fever, chills  OOB to chair       Objective:     Blood pressure 105/63, pulse (!) 110, temperature 98 4 °F (36 9 °C), temperature source Oral, resp  rate 18, weight 131 kg (289 lb 3 9 oz), SpO2 97 %  ,Body mass index is 40 34 kg/m²  Intake/Output Summary (Last 24 hours) at 5/31/2021 4165  Last data filed at 5/31/2021 0440  Gross per 24 hour   Intake 960 ml   Output 2435 ml   Net -1475 ml       Invasive Devices     Peripheral Intravenous Line            Peripheral IV 05/30/21 Left Forearm less than 1 day          Drain            Chest Tube Right 12 Fr  3 days                Physical Exam:   GENERAL APPEARANCE: in no acute distress  NEURO: stable  HEENT: normocephalic, atraumatic  CV: regular rate and rhythm, no tachycardia,   LUNGS: clear to auscultation bilaterally   On room air  R CT to -20 suction without air leak and with 310 cc serosang output  IS 1000  GI: soft, nontender, nondistended  : no abnormality detected  MSK: no abnormality detected   SKIN: no abnormality detected      Lab, Imaging and other studies:  CBC:   Lab Results   Component Value Date    WBC 5 78 05/31/2021    HGB 7 7 (L) 05/31/2021    HCT 24 5 (L) 05/31/2021    MCV 96 05/31/2021     05/31/2021    MCH 30 2 05/31/2021    MCHC 31 4 05/31/2021    RDW 16 6 (H) 05/31/2021    MPV 10 2 05/31/2021    NRBC 0 05/31/2021   , CMP:   Lab Results   Component Value Date    SODIUM 132 (L) 05/31/2021    K 4 0 05/31/2021    CL 98 (L) 05/31/2021    CO2 30 05/31/2021    BUN 40 (H) 05/31/2021    CREATININE 1 47 (H) 05/31/2021    CALCIUM 9 0 05/31/2021    EGFR 50 05/31/2021   , Coagulation: No results found for: PT, INR, APTT  VTE Pharmacologic Prophylaxis: Sequential compression device (Venodyne)   VTE Mechanical Prophylaxis: sequential compression device

## 2021-05-31 NOTE — ORTHOTIC NOTE
Orthotic Note            Date: 5/31/2021      Patient Name: Natividad Estrada            Reason for Consult:  Patient Active Problem List   Diagnosis    Atrial fibrillation ([facility] [address] )    Chronic diastolic (congestive) heart failure (HCC)    Type 2 diabetes mellitus with diabetic chronic kidney disease ([facility] [address] )    Class 3 severe obesity due to excess calories with serious comorbidity and body mass index (BMI) of 45 0 to 49 9 in adult [facility])    Lymphedema    Chronic venous insufficiency    Varicose veins of both lower extremities with inflammation    Hypertensive heart and chronic kidney disease with heart failure and stage 1 through stage 4 chronic kidney disease, or chronic kidney disease (HCC)    STEFF (obstructive sleep apnea)    Pleural effusion on right    SOB (shortness of breath)    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 ascites (HCC)    CKD (chronic kidney disease) stage 3, GFR 30-59 ml/min (HCC)    Hyperkalemia    Tobacco abuse    Chronic anemia    Thrombocytopenia (HCC)    Hypokalemia    Other cirrhosis of liver ([facility] [address] )    Encounter for screening for other viral diseases     Lactic acidosis    Permanent atrial fibrillation ([facility] [address] )    Diverticulosis of colon    Lesion of spleen    Type 2 diabetes mellitus with hyperglycemia, without long-term current use of insulin (HCC)    Acute on chronic diastolic CHF (congestive heart failure) (HCC)    Tobacco use    Constipation    Pulmonary hypertension (HCC)    Vitamin D deficiency    Atelectasis    Anemia    Hypercalcemia    Acute kidney injury superimposed on CKD ([facility] [address] )    Acute renal failure superimposed on stage 3b chronic kidney disease ([facility] [address] )    Hypertensive nephrosclerosis    Chronic kidney disease-mineral and bone disorder    Hypotension   [employer] Quick[employer] Universal Wrist Lacer     [employer] delivered and fit a [employer] Universal Wrist lacer onto pt's RUE while sitting in chair at bedside   Able to don into proper position but due to swelling, unable to properly attach velcro for optimal support  Pt okay with brace as is  RN aware and will continue to follow up daily  Recommendations:  Please call [employer] in regards to any bracing instructions and/or adjustments       [address] [facility] Restorative Technician, BS

## 2021-05-31 NOTE — PLAN OF CARE
Problem: Potential for Falls  Goal: Patient will remain free of falls  Description: INTERVENTIONS:  - Assess patient frequently for physical needs  -  Identify cognitive and physical deficits and behaviors that affect risk of falls    -  [place] fall precautions as indicated by assessment   - Educate patient/family on patient safety including physical limitations  - Instruct patient to call for assistance with activity based on assessment  - Modify environment to reduce risk of injury  - Consider OT/PT consult to assist with strengthening/mobility  Outcome: Progressing     Problem: PAIN - ADULT  Goal: Verbalizes/displays adequate comfort level or baseline comfort level  Description: Interventions:  - Encourage patient to monitor pain and request assistance  - Assess pain using appropriate pain scale  - Administer analgesics based on type and severity of pain and evaluate response  - Implement non-pharmacological measures as appropriate and evaluate response  - Consider cultural and social influences on pain and pain management  - Notify physician/advanced practitioner if interventions unsuccessful or patient reports new pain  Outcome: Progressing     Problem: INFECTION - ADULT  Goal: Absence or prevention of progression during hospitalization  Description: INTERVENTIONS:  - Assess and monitor for signs and symptoms of infection  - Monitor lab/diagnostic results  - Monitor all insertion sites, i e  indwelling lines, tubes, and drains  - Monitor endotracheal if appropriate and nasal secretions for changes in amount and color  - [place] appropriate cooling/warming therapies per order  - Administer medications as ordered  - Instruct and encourage patient and family to use good hand hygiene technique  - Identify and instruct in appropriate isolation precautions for identified infection/condition  Outcome: Progressing  Goal: Absence of fever/infection during neutropenic period  Description: INTERVENTIONS:  - Monitor WBC    Outcome: Progressing     Problem: SAFETY ADULT  Goal: Patient will remain free of falls  Description: INTERVENTIONS:  - Assess patient frequently for physical needs  -  Identify cognitive and physical deficits and behaviors that affect risk of falls    -  [place] fall precautions as indicated by assessment   - Educate patient/family on patient safety including physical limitations  - Instruct patient to call for assistance with activity based on assessment  - Modify environment to reduce risk of injury  - Consider OT/PT consult to assist with strengthening/mobility  Outcome: Progressing  Goal: Maintain or return to baseline ADL function  Description: INTERVENTIONS:  -  Assess patient's ability to carry out ADLs; assess patient's baseline for ADL function and identify physical deficits which impact ability to perform ADLs (bathing, care of mouth/teeth, toileting, grooming, dressing, etc )  - Assess/evaluate cause of self-care deficits   - Assess range of motion  - Assess patient's mobility; develop plan if impaired  - Assess patient's need for assistive devices and provide as appropriate  - Encourage maximum independence but intervene and supervise when necessary  - Involve family in performance of ADLs  - Assess for home care needs following discharge   - Consider OT consult to assist with ADL evaluation and planning for discharge  - Provide patient education as appropriate  Outcome: Progressing  Goal: Maintain or return mobility status to optimal level  Description: INTERVENTIONS:  - Assess patient's baseline mobility status (ambulation, transfers, stairs, etc )    - Identify cognitive and physical deficits and behaviors that affect mobility  - Identify mobility aids required to assist with transfers and/or ambulation (gait belt, sit-to-stand, lift, walker, cane, etc )  - [place] fall precautions as indicated by assessment  - Record patient progress and toleration of activity level on Mobility SBAR; progress patient to next Phase/Stage  - Instruct patient to call for assistance with activity based on assessment  - Consider rehabilitation consult to assist with strengthening/weightbearing, etc   Outcome: Progressing     Problem: DISCHARGE PLANNING  Goal: Discharge to home or other facility with appropriate resources  Description: INTERVENTIONS:  - Identify barriers to discharge w/patient and caregiver  - Arrange for needed discharge resources and transportation as appropriate  - Identify discharge learning needs (meds, wound care, etc )  - Arrange for interpretive services to assist at discharge as needed  - Refer to Case Management Department for coordinating discharge planning if the patient needs post-hospital services based on physician/advanced practitioner order or complex needs related to functional status, cognitive ability, or social support system  Outcome: Progressing     Problem: Knowledge Deficit  Goal: Patient/family/caregiver demonstrates understanding of disease process, treatment plan, medications, and discharge instructions  Description: Complete learning assessment and assess knowledge base    Interventions:  - Provide teaching at level of understanding  - Provide teaching via preferred learning methods  Outcome: Progressing     Problem: Prexisting or High Potential for Compromised Skin Integrity  Goal: Skin integrity is maintained or improved  Description: INTERVENTIONS:  - Identify patients at risk for skin breakdown  - Assess and monitor skin integrity  - Assess and monitor nutrition and hydration status  - Monitor labs   - Assess for incontinence   - Turn and reposition patient  - Assist with mobility/ambulation  - Relieve pressure over bony prominences  - Avoid friction and shearing  - Provide appropriate hygiene as needed including keeping skin clean and dry  - Evaluate need for skin moisturizer/barrier cream  - Collaborate with interdisciplinary team   - Patient/family teaching  - Consider wound care consult   Outcome: Progressing     Problem: CARDIOVASCULAR - ADULT  Goal: Maintains optimal cardiac output and hemodynamic stability  Description: INTERVENTIONS:  - Monitor I/O, vital signs and rhythm  - Monitor for S/S and trends of decreased cardiac output  - Administer and titrate ordered vasoactive medications to optimize hemodynamic stability  - Assess quality of pulses, skin color and temperature  - Assess for signs of decreased coronary artery perfusion  - Instruct patient to report change in severity of symptoms  Outcome: Progressing  Goal: Absence of cardiac dysrhythmias or at baseline rhythm  Description: INTERVENTIONS:  - Continuous cardiac monitoring, vital signs, obtain 12 lead EKG if ordered  - Administer antiarrhythmic and heart rate control medications as ordered  - Monitor electrolytes and administer replacement therapy as ordered  Outcome: Progressing     Problem: RESPIRATORY - ADULT  Goal: Achieves optimal ventilation and oxygenation  Description: INTERVENTIONS:  - Assess for changes in respiratory status  - Assess for changes in mentation and behavior  - Position to facilitate oxygenation and minimize respiratory effort  - Oxygen administered by appropriate delivery if ordered  - Initiate smoking cessation education as indicated  - Encourage broncho-pulmonary hygiene including cough, deep breathe, Incentive Spirometry  - Assess the need for suctioning and aspirate as needed  - Assess and instruct to report SOB or any respiratory difficulty  - Respiratory Therapy support as indicated  Outcome: Progressing     Problem: GASTROINTESTINAL - ADULT  Goal: Minimal or absence of nausea and/or vomiting  Description: INTERVENTIONS:  - Administer IV fluids if ordered to ensure adequate hydration  - Maintain NPO status until nausea and vomiting are resolved  - Nasogastric tube if ordered  - Administer ordered antiemetic medications as needed  - Provide nonpharmacologic comfort measures as appropriate  - Advance diet as tolerated, if ordered  - Consider nutrition services referral to assist patient with adequate nutrition and appropriate food choices  Outcome: Progressing  Goal: Maintains or returns to baseline bowel function  Description: INTERVENTIONS:  - Assess bowel function  - Encourage oral fluids to ensure adequate hydration  - Administer IV fluids if ordered to ensure adequate hydration  - Administer ordered medications as needed  - Encourage mobilization and activity  - Consider nutritional services referral to assist patient with adequate nutrition and appropriate food choices  Outcome: Progressing  Goal: Maintains adequate nutritional intake  Description: INTERVENTIONS:  - Monitor percentage of each meal consumed  - Identify factors contributing to decreased intake, treat as appropriate  - Assist with meals as needed  - Monitor I&O, weight, and lab values if indicated  - Obtain nutrition services referral as needed  Outcome: Progressing     Problem: METABOLIC, FLUID AND ELECTROLYTES - ADULT  Goal: Electrolytes maintained within normal limits  Description: INTERVENTIONS:  - Monitor labs and assess patient for signs and symptoms of electrolyte imbalances  - Administer electrolyte replacement as ordered  - Monitor response to electrolyte replacements, including repeat lab results as appropriate  - Instruct patient on fluid and nutrition as appropriate  Outcome: Progressing  Goal: Fluid balance maintained  Description: INTERVENTIONS:  - Monitor labs   - Monitor I/O and WT  - Instruct patient on fluid and nutrition as appropriate  - Assess for signs & symptoms of volume excess or deficit  Outcome: Progressing     Problem: SKIN/TISSUE INTEGRITY - ADULT  Goal: Skin integrity remains intact  Description: INTERVENTIONS  - Identify patients at risk for skin breakdown  - Assess and monitor skin integrity  - Assess and monitor nutrition and hydration status  - Monitor labs (i e  albumin)  - Assess for incontinence   - Turn and reposition patient  - Assist with mobility/ambulation  - Relieve pressure over bony prominences  - Avoid friction and shearing  - Provide appropriate hygiene as needed including keeping skin clean and dry  - Evaluate need for skin moisturizer/barrier cream  - Collaborate with interdisciplinary team (i e  Nutrition, Rehabilitation, etc )   - Patient/family teaching  Outcome: Progressing  Goal: Incision(s), wounds(s) or drain site(s) healing without S/S of infection  Description: INTERVENTIONS  - Assess and document risk factors for skin impairment   - Assess and document dressing, incision, wound bed, drain sites and surrounding tissue  - Consider nutrition services referral as needed  - Oral mucous membranes remain intact  - Provide patient/ family education  Outcome: Progressing  Goal: Oral mucous membranes remain intact  Description: INTERVENTIONS  - Assess oral mucosa and hygiene practices  - Implement preventative oral hygiene regimen  - Implement oral medicated treatments as ordered  - Initiate Nutrition services referral as needed  Outcome: Progressing     Problem: HEMATOLOGIC - ADULT  Goal: Maintains hematologic stability  Description: INTERVENTIONS  - Assess for signs and symptoms of bleeding or hemorrhage  - Monitor labs  - Administer supportive blood products/factors as ordered and appropriate  Outcome: Progressing     Problem: MUSCULOSKELETAL - ADULT  Goal: Maintain or return mobility to safest level of function  Description: INTERVENTIONS:  - Assess patient's ability to carry out ADLs; assess patient's baseline for ADL function and identify physical deficits which impact ability to perform ADLs (bathing, care of mouth/teeth, toileting, grooming, dressing, etc )  - Assess/evaluate cause of self-care deficits   - Assess range of motion  - Assess patient's mobility  - Assess patient's need for assistive devices and provide as appropriate  - Encourage maximum independence but intervene and supervise when necessary  - Involve family in performance of ADLs  - Assess for home care needs following discharge   - Consider OT consult to assist with ADL evaluation and planning for discharge  - Provide patient education as appropriate  Outcome: Progressing  Goal: Maintain proper alignment of affected body part  Description: INTERVENTIONS:  - Support, maintain and protect limb and body alignment  - Provide patient/ family with appropriate education  Outcome: Progressing

## 2021-05-31 NOTE — QUICK NOTE
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

## 2021-05-31 NOTE — PROGRESS NOTES
Patient c/o severe 10/10 pain in his right wrist  It is slightly reddened, tender to the touch, and swollen  Patient given ice pack and prn 5mg oxycodone  Will notify day team regarding wrist in am  Patient tolerating ice and oxy

## 2021-05-31 NOTE — CONSULTS
Consultation - Cardiology  Para Erp 57 y o  male MRN: [id number]  Unit/Bed#: [facility] 422-01 Encounter: [id number]          Inpatient consult to Cardiology     Performed by  Geni Sr DO     Authorized by Ruthie Celestin MD            History of Present Illness   Physician Requesting Consult: Corona Grady DO  Reason for Consult / Principal Problem:  Preop risk stratification    Assessment/Plan   Recurrent right pleural effusion  -thought to be secondary to hepatic cirrhosis (hydrothorax), planned for VATS procedure secondary to lung underexpansion despite chest tube placement and tPA  -in perioperative period:  Continue with strict I&Os, daily fluid status assessment, diuresing with IV Lasix (at home 80 mg b i d , here 40 mg IV intermittently) at least IV 40 mg q d  with plan to increase, as blood pressure permits, to the home dose  Has not been able to receive home Coreg secondary to hypotension for which currently on midodrine  Atrial fibrillation chronic  -chads Vasc 3 (hypertension, CHF, diabetes mellitus)  -systemic anticoagulation:  warfarin is held secondary to bloody pleural effusion, acute on chronic anemia for which required blood transfusions  Please restart once acceptable from surgery standpoint  -rate control: At home on Coreg 6 25 mg b i d  Has not been able to receive it secondary to hypotension (for which currently on midodrine 10 mg t i d )  Currently in atrial fibrillation with heart rate in 100-110s with soft blood pressures  Would consider starting on digoxin if heart rate persistently over 120s  -please monitor on telemetry    Heart failure with preserved ejection fraction  -at home on Lasix 80 mg b i d  and metolazone 5 mg twice weekly  Initially with MARISABEL but currently at baseline creatinine  Patient getting intermittent diuresis with IV Lasix 40 given soft blood pressures    hypervolemic Would continue with IV Lasix 40 q d  with plan to increase to the home dose as blood pressure permits  Nephrology is following, will appreciate their recommendations  -unable to receive Coreg secondary to low blood pressures   -lisinopril is on held initially secondary to MARISABEL currently secondary to low blood pressures    HPI: Lizette Boyd is a 57y o  year old male with past medical history of heart failure with preserved ejection fraction (EF 60% in 2021), chronic atrial fibrillation, hypertension, obstructive sleep apnea, diabetes mellitus type 2, morbid obesity, cirrhosis presented initially to [facility] with dyspnea and was found to have recurrent right-sided pleural effusion for which underwent thoracocentesis and chest tube placement, but continued having underexpansion of the lung despite multiple interventions ( chest tube upsizing, tPA) for which he is planned to have VATS procedure this Wednesday  This stay is complicated by MARISABEL which improved, currently at baseline creatinine  Also complicated by acute on chronic anemia for which required packed red blood cells transfusion  Patient was admitted to [facility] in May of 2021 with acute on chronic heart failure for which was diuresed with IV diuretics and the was seen by Dr Leanna Araiza after being discharged      Primary Cardiologist: Dr Leanna Araiza T    EKG:       Cardiac testing:   ECHO:   Results for orders placed during the hospital encounter of 21   Echo complete with contrast if indicated    Narrative [address] [facility] [address] [facility] [address], [place]  [phone]    Transthoracic Echocardiogram  2D, M-mode, Doppler, and Color Doppler    Study date:  2021    Patient: Montserrat Nazario  MR number: [id number]  Account number: [de-identified]  : 1960  Age: 57 years  Gender: Male  Status: Inpatient  Location: Bedside  Height: 71 in  Weight: 350 lb  BP: 130/ 80 mmHg    Indications: shortness of breath    Diagnoses: 428 0 - CONGESTIVE HEART FAILURE    Sonographer:  LATASHA Kuo  Primary Physician:  Lynnette Aiken DO  Referring Physician:  Flor Lomas DO  Group:  [facility]  Interpreting Physician:  Jacqueline Wick DO    SUMMARY    LEFT VENTRICLE:  Systolic function was normal  Ejection fraction was estimated to be 60 %  There were no regional wall motion abnormalities  Wall thickness was mildly increased  RIGHT VENTRICLE:  The ventricle was moderately dilated  Systolic function was mildly reduced  LEFT ATRIUM:  The atrium was mildly dilated  RIGHT ATRIUM:  The atrium was moderately dilated  MITRAL VALVE:  There was mild regurgitation  TRICUSPID VALVE:  There was moderate regurgitation  Estimated peak PA pressure was 52 mmHg  HISTORY: PRIOR HISTORY: CHF, morbid obesity    PROCEDURE: The procedure was performed at the bedside  This was a routine study  The transthoracic approach was used  The study included complete 2D imaging, M-mode, complete spectral Doppler, and color Doppler  The heart rate was 80 bpm,  at the start of the study  Echocardiographic views were limited due to poor patient compliance and poor acoustic window availability  This was a technically difficult study  LEFT VENTRICLE: Size was normal  Systolic function was normal  Ejection fraction was estimated to be 60 %  There were no regional wall motion abnormalities  Wall thickness was mildly increased  DOPPLER: The study was not technically  sufficient to allow evaluation of LV diastolic function  RIGHT VENTRICLE: The ventricle was moderately dilated  Systolic function was mildly reduced  LEFT ATRIUM: The atrium was mildly dilated  RIGHT ATRIUM: The atrium was moderately dilated  MITRAL VALVE: Valve structure was normal  There was normal leaflet separation  DOPPLER: The transmitral velocity was within the normal range  There was no evidence for stenosis  There was mild regurgitation      AORTIC VALVE: The valve was trileaflet  Leaflets exhibited normal thickness and normal cuspal separation  DOPPLER: Transaortic velocity was within the normal range  There was no evidence for stenosis  There was no regurgitation  TRICUSPID VALVE: The valve structure was normal  There was normal leaflet separation  DOPPLER: The transtricuspid velocity was within the normal range  There was no evidence for stenosis  There was moderate regurgitation  Estimated peak PA  pressure was 52 mmHg  PULMONIC VALVE: Leaflets exhibited normal thickness, no calcification, and normal cuspal separation  DOPPLER: The transpulmonic velocity was within the normal range  There was no regurgitation  PERICARDIUM: There was no pericardial effusion  The pericardium was normal in appearance  AORTA: The root exhibited normal size  SYSTEMIC VEINS: IVC: The inferior vena cava was not well visualized  SYSTEM MEASUREMENT TABLES    2D  %FS: 31 45 %  Ao Diam: 3 22 cm  EDV(Teich): 148 86 ml  EF(Teich): 58 74 %  ESV(Teich): 61 42 ml  IVSd: 1 51 cm  LA Area: 20 63 cm2  LA Diam: 5 65 cm  LVIDd: 5 52 cm  LVIDs: 3 79 cm  LVPWd: 1 08 cm  RA Area: 33 86 cm2  RWT: 0 39  SV(Teich): 87 44 ml    CW  RAP: 0 mmHg  TR Vmax: 3 47 m/s  TR maxP 33 mmHg    PW  E' Sept: 0 09 m/s  E/E' Sept: 13 35  MV A Ti: 0 48 m/s  MV Dec Dougherty: 4 5 m/s2  MV DecT: 253 15 ms  MV E Ti: 1 14 m/s  MV E/A Ratio: 2 36  RVSP: 48 33 mmHg    [facility] Accredited Echocardiography Laboratory    Prepared and electronically signed by    Mahin Hare DO  Signed 2021 08:06:00       Review of Systems  ROS as noted above, otherwise 12 point review of systems was performed and is negative       Historical Information   Past Medical History:   Diagnosis Date    A-fib [facility])     Cardiac disease     Chronic combined systolic (congestive) and diastolic (congestive) heart failure (HCC)     Diabetes mellitus ([facility] [address] )     Dilated cardiomyopathy ([facility] [address] )     History of echocardiogram 11/24/2014    EF 0 30 (30%), Likely mod LV systolic dysfunction  Likely RV dysfunction as well   History of Lexiscan MPI 02/19/2016    EF 0 43 (43%), no prior MI or ischemia   Hx of echocardiogram 04/28/2017    Normal EF, Normal LV systolic function  Mild concentric LV hypertrophy  Mild mitral and tricuspid regurg      Hx: recurrent pneumonia     Hypertension     Long term (current) use of anticoagulants     Morbid (severe) obesity due to excess calories (HCC)     Neuropathy      Past Surgical History:   Procedure Laterality Date    HERNIA REPAIR      IR CHEST TUBE CHECK/CHANGE/REPOSITION/UPSIZE  5/27/2021    IR CHEST TUBE PLACEMENT  5/22/2021    IR THORACENTESIS  8/25/2020    IR THORACENTESIS  2/23/2021    SPLENECTOMY, TOTAL      VASCULAR SURGERY Right     leg     Social History     Substance and Sexual Activity   Alcohol Use Yes    Alcohol/week: 14 0 standard drinks    Types: 5 Glasses of wine, 5 Cans of beer, 4 Shots of liquor per week    Frequency: 2-3 times a week    Drinks per session: 5 or 6    Binge frequency: Never    Comment: WEEKLY     Social History     Substance and Sexual Activity   Drug Use Never     Social History     Tobacco Use   Smoking Status Current Some Day Smoker    Packs/day: 0 25    Types: Cigarettes   Smokeless Tobacco Never Used   Tobacco Comment    2-3 CIGARETTES DAILY     Hospital Medications:   Current Facility-Administered Medications   Medication Dose Route Frequency    acetaminophen (TYLENOL) tablet 650 mg  650 mg Oral Q6H PRN    calcium carbonate (TUMS) chewable tablet 1,000 mg  1,000 mg Oral Daily PRN    carvedilol (COREG) tablet 6 25 mg  6 25 mg Oral BID With Meals    ergocalciferol (VITAMIN D2) capsule 50,000 Units  50,000 Units Oral Weekly    HYDROmorphone HCl (DILAUDID) injection 0 2 mg  0 2 mg Intravenous Q4H PRN    menthol-methyl salicylate (BENGAY) 71-76 % cream   Apply externally 4x Daily PRN    metoprolol (LOPRESSOR) injection 2 5 mg  2 5 mg Intravenous Q6H PRN    midodrine (PROAMATINE) tablet 10 mg  10 mg Oral TID AC    nicotine (NICODERM CQ) 14 mg/24hr TD 24 hr patch 1 patch  1 patch Transdermal Daily    ondansetron (ZOFRAN) injection 4 mg  4 mg Intravenous Q6H PRN    oxyCODONE (ROXICODONE) IR tablet 2 5 mg  2 5 mg Oral Q4H PRN    oxyCODONE (ROXICODONE) IR tablet 5 mg  5 mg Oral Q4H PRN    polyethylene glycol (MIRALAX) packet 17 g  17 g Oral Daily PRN    senna-docusate sodium (SENOKOT S) 8 6-50 mg per tablet 1 tablet  1 tablet Oral HS     Home Medications:   Medications Prior to Admission   Medication    carvedilol (COREG) 6 25 mg tablet    docusate sodium (COLACE) 100 mg capsule    ergocalciferol (ERGOCALCIFEROL) 1 25 MG (27622 UT) capsule    ferrous sulfate 324 (65 Fe) mg    furosemide (LASIX) 80 mg tablet    glucose blood test strip    Lancets (freestyle) lancets    lisinopril (ZESTRIL) 5 mg tablet    metolazone (ZAROXOLYN) 5 mg tablet    omeprazole (PriLOSEC) 20 mg delayed release capsule    polyethylene glycol (MIRALAX) 17 g packet    potassium chloride (K-DUR,KLOR-CON) 20 mEq tablet    warfarin (COUMADIN) 5 mg tablet     No Known Allergies    Objective   Vitals: Blood pressure 109/67, pulse (!) 110, temperature 98 3 °F (36 8 °C), resp  rate 20, weight 131 kg (289 lb 3 9 oz), SpO2 99 %    Orthostatic Blood Pressures      Most Recent Value   Blood Pressure  109/67 filed at 05/31/2021 0900   Patient Position - Orthostatic VS  Sitting filed at 05/31/2021 8856          Intake/Output Summary (Last 24 hours) at 5/31/2021 1019  Last data filed at 5/31/2021 0701  Gross per 24 hour   Intake 720 ml   Output 2625 ml   Net -1905 ml     Invasive Devices     Peripheral Intravenous Line            Peripheral IV 05/30/21 Left Forearm 1 day          Drain            Chest Tube Right 12 Fr  3 days              Physical Exam   GEN: NAD, alert and oriented  HEENT: NCAT, PERRL, EOMs intact  NECK:  Unable to assess JVD secondary to body habitus  CARDIOVASCULAR: RRR, normal S1, S2 without murmurs, rubs, or gallops appreciated  LUNGS:  Diminished lung sounds on the right lung base, clear to auscultation on the left side  ABDOMEN: Soft, nontender, nondistended  EXTREMITIES/VASCULAR:  Chronic vascular changes in bilateral lower extremities  +1-2 edema bilaterally  PSYCH: Normal mood and affect    Lab Results: I have personally reviewed pertinent lab results  Results from last 7 days   Lab Units 05/31/21  0503 05/30/21  0529 05/29/21  0444   WBC Thousand/uL 5 78 4 60 5 11   HEMOGLOBIN g/dL 7 7* 8 0* 7 8*   HEMATOCRIT % 24 5* 25 6* 25 1*   PLATELETS Thousands/uL 187 177 158     Results from last 7 days   Lab Units 05/31/21  0503 05/30/21  0529 05/29/21  0444   POTASSIUM mmol/L 4 0 4 2 3 9   CHLORIDE mmol/L 98* 99* 98*   CO2 mmol/L 30 27 32   BUN mg/dL 40* 47* 52*   CREATININE mg/dL 1 47* 1 57* 1 51*   CALCIUM mg/dL 9 0 9 0 9 0     Results from last 7 days   Lab Units 05/27/21  1209 05/24/21  1159   INR  1 36* 1 44*     Results from last 7 days   Lab Units 05/29/21  0444 05/27/21  0155   MAGNESIUM mg/dL 2 7* 2 7*     Imaging: I have personally reviewed pertinent reports

## 2021-05-31 NOTE — CONSULTS
Consultation - [facility]  Sami Edyta 57 y o  male MRN: [id number]  Unit/Bed#: [facility] 422-01 Encounter: [id number]    Reason for Consult / Principal Problem:     Cirrhosis, pre-op clearance    ASSESSMENT AND PLAN:      1  Suspected decompensated cirrhosis - complicated by history of ascites  Etiology thought to be secondary to fatty liver disease vs alcohol  However, it does not appear that patient has ever had a formal diagnosis of cirrhosis based on biopsy  Diagnosis appears to be largely dependent on imaging findings and lab values  However, his labs including albumin and platelet count have been relatively normal which would suggest against impaired synthetic function or significant portal hypertension  If this is indeed cirrhosis, it appears to be relatively stable without signs of any acute decompensation    · MELD score 18  · Child-Malloy B  · Recommend IR consultation for transjugular biopsy with portal pressure measurements for definitive evaluation and diagnosis of suspected underlying cirrhosis; this will also help with future planning and management of his chronic liver disease  · Will place IR consult; if this is able to be done tomorrow depending on IR availability, that would be great but if not, it can certainly be done after his VATS procedure  · Ascites:  · Small amount of ascites evident on prior imaging, mostly body wall edema  · Does not appear to have significant ascites currently but with significant anasarca and lower extremity edema  · Continue diuresis at discretion of cardiology for CHF  · Okay to continue carvedilol given no evidence of refractory ascites  · Elevate legs to help with LE edema  · Recommend 2 gm sodium restriction with diet  · Optimize nutrition  · Monitor UOP  · Varices:  · No varices on EGD in 10/2020  · No need for repeat EGD at this time  · On carvedilol 6 25 mg BID for CHF; okay to continue given no evidence of refractory ascites  · Follow clinically  · Repeat EGD is any acute decompensation  · PSE:  · No prior history of HE and none currently  · No indication for lactulose or rifaximin at this time  · Follow clinically  · Minimize narcotics and sedating medications  · HCC screening:  · Recent CT C/A/P with contrast on 2/22/2021 with no focal liver lesions seen  · AFP level on 5/21/2021 was 2 7  · Continue HCC screening with liver imaging with or without AFP level every 6 months  · Transplant:  · No candidate for transplant at this time given current cardiopulmonary issues and obesity  · Encourage weight loss  · Re-assess candidacy at time of outpatient follow-up  · [place] post-operative mortality risk:  · 7-days: 3 657%  · 30-days: 14 177%  · 90-days: 21 879%  · 1-year: 38 148%  · 5-years: 73 56%    2  Right pleural effusion, questionable hepatic hydrothorax - unclear if this truly represents hepatic hydrothorax  Fluid analysis is limited but does not appear to be consistent with pleural effusions secondary to portal hypertension  · No contraindication to VATS decortication from GI standpoint  · Post-operative mortality risk as noted above in setting of cirrhosis  · Monitoring and maintenance of chest tube per thoracic surgery    ______________________________________________________________________    HISTORY OF PRESENT ILLNESS:  27-year-old male with history of decompensated cirrhosis, heart failure, atrial fibrillation on warfarin, diabetes  Patient initially presented to [facility] for recurrent right-sided pleural effusion status post thoracentesis and chest tube placement  Patient continued to have bloody output from the chest tube resulting in acute blood loss anemia requiring blood transfusion  There was possible hematoma within the pleural space on cross-sectional imaging for which pulmonology recommended the patient be transferred to [facility] for evaluation by thoracic surgery    GI consultation requested for preoperative clearance in setting of cirrhosis  Patient provide some limited history in regards to his cirrhosis  He has been seen in the GI office as well as during previous hospitalizations for his cirrhosis which appears to be relatively stable at this time without any signs of acute decompensation  Last EGD was on 10/21/2020 which showed small hiatal hernia and irregular Z-line but was otherwise normal without any signs of esophageal or gastric varices  Last colonoscopy was also on 10/21/2020 which found multiple small and large AVMs in the cecum, ascending, transverse, and sigmoid colon, treated with APC, as well as 1 diminutive polyp in the ascending colon which was removed  Patient also had small internal and external hemorrhoids  REVIEW OF SYSTEMS:    CONSTITUTIONAL: Denies any fever, chills, rigors, and weight loss  HEENT: No earache or tinnitus, denies hearing loss or visual disturbances  CARDIOVASCULAR: No chest pain or palpitations   RESPIRATORY: Denies any cough, hemoptysis  GASTROINTESTINAL: As noted in the History of Present Illness   GENITOURINARY: No problems with urination, denies any hematuria or dysuria  NEUROLOGIC: No dizziness or vertigo, denies headaches   MUSCULOSKELETAL: Denies any muscle pain   SKIN: Denies skin rashes or itching   ENDOCRINE: Denies excessive thirst, denies intolerance to heat or cold  PSYCHOSOCIAL: Denies depression or anxiety, denies any recent memory loss     Historical Information   Past Medical History:   Diagnosis Date    A-fib [facility])     Cardiac disease     Chronic combined systolic (congestive) and diastolic (congestive) heart failure [facility] )     Diabetes mellitus ([facility] )     D[facility]     History of echocardiogram 11/24/2014    EF 0 30 (30%), Likely mod LV systolic dysfunction  Likely RV dysfunction as well   History of Lexiscan MPI 02/19/2016    EF 0 43 (43%), no prior MI or ischemia      Hx of echocardiogram 04/28/2017    Normal EF, Normal LV systolic function  Mild concentric LV hypertrophy  Mild mitral and tricuspid regurg   Hx: recurrent pneumonia     Hypertension     Long term (current) use of anticoagulants     Morbid (severe) obesity due to excess calories (HCC)     Neuropathy      Past Surgical History:   Procedure Laterality Date    HERNIA REPAIR      IR CHEST TUBE CHECK/CHANGE/REPOSITION/UPSIZE  5/27/2021    IR CHEST TUBE PLACEMENT  5/22/2021    IR THORACENTESIS  8/25/2020    IR THORACENTESIS  2/23/2021    SPLENECTOMY, TOTAL      VASCULAR SURGERY Right     leg     Social History   Social History     Substance and Sexual Activity   Alcohol Use Yes    Alcohol/week: 14 0 standard drinks    Types: 5 Glasses of wine, 5 Cans of beer, 4 Shots of liquor per week    Frequency: 2-3 times a week    Drinks per session: 5 or 6    Binge frequency: Never    Comment: WEEKLY     Social History     Substance and Sexual Activity   Drug Use Never     Social History     Tobacco Use   Smoking Status Current Some Day Smoker    Packs/day: 0 25    Types: Cigarettes   Smokeless Tobacco Never Used   Tobacco Comment    2-3 CIGARETTES DAILY     No family history on file      Meds/Allergies   Medications Prior to Admission   Medication    carvedilol (COREG) 6 25 mg tablet    docusate sodium (COLACE) 100 mg capsule    ergocalciferol (ERGOCALCIFEROL) 1 25 MG (31146 UT) capsule    ferrous sulfate 324 (65 Fe) mg    furosemide (LASIX) 80 mg tablet    glucose blood test strip    Lancets (freestyle) lancets    lisinopril (ZESTRIL) 5 mg tablet    metolazone (ZAROXOLYN) 5 mg tablet    omeprazole (PriLOSEC) 20 mg delayed release capsule    polyethylene glycol (MIRALAX) 17 g packet    potassium chloride (K-DUR,KLOR-CON) 20 mEq tablet    warfarin (COUMADIN) 5 mg tablet     Current Facility-Administered Medications   Medication Dose Route Frequency    acetaminophen (TYLENOL) tablet 650 mg  650 mg Oral Q6H PRN    albumin human (FLEXBUMIN) 25 % injection 25 g  25 g Intravenous Once    calcium carbonate (TUMS) chewable tablet 1,000 mg  1,000 mg Oral Daily PRN    carvedilol (COREG) tablet 6 25 mg  6 25 mg Oral BID With Meals    ergocalciferol (VITAMIN D2) capsule 50,000 Units  50,000 Units Oral Weekly    HYDROmorphone HCl (DILAUDID) injection 0 2 mg  0 2 mg Intravenous Q4H PRN    menthol-methyl salicylate (BENGAY) 05-84 % cream   Apply externally 4x Daily PRN    metoprolol (LOPRESSOR) injection 2 5 mg  2 5 mg Intravenous Q6H PRN    midodrine (PROAMATINE) tablet 10 mg  10 mg Oral TID AC    nicotine (NICODERM CQ) 14 mg/24hr TD 24 hr patch 1 patch  1 patch Transdermal Daily    ondansetron (ZOFRAN) injection 4 mg  4 mg Intravenous Q6H PRN    oxyCODONE (ROXICODONE) IR tablet 2 5 mg  2 5 mg Oral Q4H PRN    oxyCODONE (ROXICODONE) IR tablet 5 mg  5 mg Oral Q4H PRN    polyethylene glycol (MIRALAX) packet 17 g  17 g Oral Daily PRN    senna-docusate sodium (SENOKOT S) 8 6-50 mg per tablet 1 tablet  1 tablet Oral HS     No Known Allergies      PHYSICAL EXAM:      Objective   Blood pressure 109/67, pulse (!) 110, temperature 98 3 °F (36 8 °C), resp  rate 20, weight 131 kg (289 lb 3 9 oz), SpO2 99 %  Body mass index is 40 34 kg/m²      Intake/Output Summary (Last 24 hours) at 5/31/2021 0954  Last data filed at 5/31/2021 0701  Gross per 24 hour   Intake 720 ml   Output 2745 ml   Net -2025 ml       General Appearance:   Alert, cooperative, some distress due to right arm pain   HEENT:   Normocephalic, atraumatic, anicteric     Neck:   Supple, symmetrical, trachea midline   Lungs:   Respirations unlabored    Heart:   Regular rate and rhythm   Abdomen:   Soft, non-tender, non-distended; normal bowel sounds; no masses, no organomegaly    Genitalia:   Deferred    Rectal:   Deferred    Extremities:   +Edema bilateral lower extremities    Skin:   No jaundice, rashes, or lesions      LAB RESULTS:     Admission on 05/26/2021   Component Date Value    WBC 05/27/2021 6 38     RBC 05/27/2021 2 35*    Hemoglobin 05/27/2021 7 1*    Hematocrit 05/27/2021 22 7*    MCV 05/27/2021 97     MCH 05/27/2021 30 2     MCHC 05/27/2021 31 3*    RDW 05/27/2021 17 7*    Platelets 10/13/0223 164     MPV 05/27/2021 10 7     Sodium 05/27/2021 133*    Potassium 05/27/2021 4 6     Chloride 05/27/2021 97*    CO2 05/27/2021 33*    ANION GAP 05/27/2021 3*    BUN 05/27/2021 79*    Creatinine 05/27/2021 1 86*    Glucose 05/27/2021 142*    Calcium 05/27/2021 9 1     eGFR 05/27/2021 38     Magnesium 05/27/2021 2 7*    ABO Grouping 05/27/2021 O     Rh Factor 05/27/2021 Positive     Antibody Screen 05/27/2021 Negative     Specimen Expiration Date 05/27/2021 [id number]     Unit Product Code 05/28/2021 [id number]     Unit Number 05/28/2021 [id number]     Unit ABO 05/28/2021 O     Unit RH 05/28/2021 POS     Crossmatch 05/28/2021 Compatible     Unit Dispense Status 05/28/2021 Return to [address] [place] [address] Unit Product Code 05/28/2021 [id number]     Unit Number 05/28/2021 [id number]     Unit ABO 05/28/2021 O     Unit RH 05/28/2021 POS     Crossmatch 05/28/2021 Compatible     Unit Dispense Status 05/28/2021 Presumed Trans     Protime 05/27/2021 16 8*    INR 05/27/2021 1 36*    Hemoglobin 05/27/2021 8 1*    Hematocrit 05/27/2021 25 9*    Sodium 05/28/2021 133*    Potassium 05/28/2021 4 2     Chloride 05/28/2021 98*    CO2 05/28/2021 30     ANION GAP 05/28/2021 5     BUN 05/28/2021 65*    Creatinine 05/28/2021 1 70*    Glucose 05/28/2021 107     Calcium 05/28/2021 9 2     eGFR 05/28/2021 42     WBC 05/28/2021 4 71     RBC 05/28/2021 2 63*    Hemoglobin 05/28/2021 8 0*    Hematocrit 05/28/2021 25 4*    MCV 05/28/2021 97     MCH 05/28/2021 30 4     MCHC 05/28/2021 31 5     RDW 05/28/2021 17 2*    MPV 05/28/2021 10 0     Platelets 03/72/2595 146*    nRBC 05/28/2021 0     Neutrophils Relative 05/28/2021 74     Immat GRANS % 05/28/2021 0     Lymphocytes Relative 05/28/2021 11*    Monocytes Relative 05/28/2021 10     Eosinophils Relative 05/28/2021 4     Basophils Relative 05/28/2021 1     Neutrophils Absolute 05/28/2021 3 47     Immature Grans Absolute 05/28/2021 0 02     Lymphocytes Absolute 05/28/2021 0 53*    Monocytes Absolute 05/28/2021 0 49     Eosinophils Absolute 05/28/2021 0 17     Basophils Absolute 05/28/2021 0 03     Sodium 05/29/2021 134*    Potassium 05/29/2021 3 9     Chloride 05/29/2021 98*    CO2 05/29/2021 32     ANION GAP 05/29/2021 4     BUN 05/29/2021 52*    Creatinine 05/29/2021 1 51*    Glucose 05/29/2021 116     Calcium 05/29/2021 9 0     eGFR 05/29/2021 49     WBC 05/29/2021 5 11     RBC 05/29/2021 2 58*    Hemoglobin 05/29/2021 7 8*    Hematocrit 05/29/2021 25 1*    MCV 05/29/2021 97     MCH 05/29/2021 30 2     MCHC 05/29/2021 31 1*    RDW 05/29/2021 16 9*    Platelets 88/50/1208 158     MPV 05/29/2021 10 5     Magnesium 05/29/2021 2 7*    POC Glucose 05/29/2021 140     Sodium 05/30/2021 132*    Potassium 05/30/2021 4 2     Chloride 05/30/2021 99*    CO2 05/30/2021 27     ANION GAP 05/30/2021 6     BUN 05/30/2021 47*    Creatinine 05/30/2021 1 57*    Glucose 05/30/2021 109     Calcium 05/30/2021 9 0     eGFR 05/30/2021 47     WBC 05/30/2021 4 60     RBC 05/30/2021 2 67*    Hemoglobin 05/30/2021 8 0*    Hematocrit 05/30/2021 25 6*    MCV 05/30/2021 96     MCH 05/30/2021 30 0     MCHC 05/30/2021 31 3*    RDW 05/30/2021 16 6*    Platelets 65/91/1493 177     MPV 05/30/2021 10 4     POC Glucose 05/30/2021 118     Sodium 05/31/2021 132*    Potassium 05/31/2021 4 0     Chloride 05/31/2021 98*    CO2 05/31/2021 30     ANION GAP 05/31/2021 4     BUN 05/31/2021 40*    Creatinine 05/31/2021 1 47*    Glucose 05/31/2021 127     Calcium 05/31/2021 9 0     eGFR 05/31/2021 50     WBC 05/31/2021 5 78     RBC 05/31/2021 2 55*    Hemoglobin 05/31/2021 7 7*    Hematocrit 05/31/2021 24 5*    MCV 05/31/2021 96     MCH 05/31/2021 30 2     MCHC 05/31/2021 31 4     RDW 05/31/2021 16 6*    MPV 05/31/2021 10 2     Platelets 89/23/8607 187     nRBC 05/31/2021 0     Neutrophils Relative 05/31/2021 79*    Immat GRANS % 05/31/2021 1     Lymphocytes Relative 05/31/2021 8*    Monocytes Relative 05/31/2021 9     Eosinophils Relative 05/31/2021 2     Basophils Relative 05/31/2021 1     Neutrophils Absolute 05/31/2021 4 60     Immature Grans Absolute 05/31/2021 0 04     Lymphocytes Absolute 05/31/2021 0 46*    Monocytes Absolute 05/31/2021 0 51     Eosinophils Absolute 05/31/2021 0 14     Basophils Absolute 05/31/2021 0 03        RADIOLOGY RESULTS: I have personally reviewed pertinent imaging studies  EMILIANA Bhakta  Gastroenterology Fellow  [facility]  Available on Beryle [email or web address] [email or web address]

## 2021-06-01 PROBLEM — M25.531 RIGHT WRIST PAIN: Status: ACTIVE | Noted: 2021-01-01

## 2021-06-01 NOTE — PROGRESS NOTES
NEPHROLOGY PROGRESS NOTE   Troy Ko 57 y o  male MRN: [id number]  Unit/Bed#: [facility] 422-01 Encounter: [id number]  Reason for Consult:  Acute kidney injury    ASSESSMENT/PLAN:  1  Acute kidney injury, likely multifactorial, possible component of ATN given impaired autoregulation in the face of hypotension  2  CKD stage 3 baseline creatinine 1 5-1 8  3  Hyponatremia is likely secondary to some degree of volume overload hold Lasix today, Samsca 7 5 mg x 1  4  Liver cirrhosis  5  Cardiomyopathy, ejection fraction 60% with likely diastolic dysfunction  6  Right pleural effusions status post chest tube placement, anticipating VATS tomorrow  7  Chronic hypotension, continue with midodrine 3 times daily    PLAN:  · Overall patient has had good diuresis with slight improvement in weight however sodium level slightly worsening, hold further loop diuretic therapy, give 1 dose of Samsca x1  · Renal function appears stable  · Anticipating surgical intervention tomorrow    SUBJECTIVE:  Seen and examined  Patient appears frustrated  No reports of chest pain or shortness of breath  Persistent lower extremity swelling  Frustrated that he is NPO  Review of Systems    OBJECTIVE:  Current Weight: Weight - Scale: 130 kg (287 lb 11 2 oz)  Vitals:    06/01/21 0314 06/01/21 0600 06/01/21 0645 06/01/21 1057   BP: 122/83  106/67 123/80   BP Location: Left arm      Pulse: (!) 120      Resp: 16  18 18   Temp: 98 5 °F (36 9 °C)  98 8 °F (37 1 °C) 99 4 °F (37 4 °C)   TempSrc: Oral      SpO2: 95%      Weight:  130 kg (287 lb 11 2 oz)         Intake/Output Summary (Last 24 hours) at 6/1/2021 1135  Last data filed at 6/1/2021 1100  Gross per 24 hour   Intake 1139 17 ml   Output 1640 ml   Net -500 83 ml       Physical Exam  Constitutional:       Appearance: He is obese  He is not ill-appearing  HENT:      Head: Normocephalic and atraumatic  Eyes:      General: No scleral icterus    Cardiovascular:      Rate and Rhythm: Normal rate and regular rhythm  Pulmonary:      Effort: Pulmonary effort is normal       Breath sounds: Examination of the right-lower field reveals decreased breath sounds  Examination of the left-lower field reveals decreased breath sounds  Decreased breath sounds present  Abdominal:      General: There is no distension  Palpations: Abdomen is soft  Musculoskeletal:      Right lower leg: Edema present  Left lower leg: Edema present  Skin:     General: Skin is warm and dry  Neurological:      Mental Status: He is alert and oriented to person, place, and time           Medications:    Current Facility-Administered Medications:     acetaminophen (TYLENOL) tablet 650 mg, 650 mg, Oral, Q6H PRN, Mary Ellen Garcia MD, 650 mg at 05/29/21 1734    calcium carbonate (TUMS) chewable tablet 1,000 mg, 1,000 mg, Oral, Daily PRN, Mary Ellen Garcia MD    carvedilol (COREG) tablet 12 5 mg, 12 5 mg, Oral, BID With Meals, Adina Severin, MD    ergocalciferol (VITAMIN D2) capsule 50,000 Units, 50,000 Units, Oral, Weekly, Mary Ellen Garcia MD, 50,000 Units at 05/29/21 1036    HYDROmorphone HCl (DILAUDID) injection 0 2 mg, 0 2 mg, Intravenous, Q4H PRN, Luis Gonzales MD, 0 2 mg at 05/31/21 0654    lidocaine (LMX) 4 % cream, , Topical, Daily PRN, Clifford Lewis DO    menthol-methyl salicylate (BENGAY) 40-91 % cream, , Apply externally, 4x Daily PRN, Iain Edwards DO, Given at 05/30/21 2048    metoprolol (LOPRESSOR) injection 2 5 mg, 2 5 mg, Intravenous, Q6H PRN, David Tim PA-C    midodrine (PROAMATINE) tablet 10 mg, 10 mg, Oral, TID AC, Leobardo Reeves MD, 10 mg at 06/01/21 1119    nicotine (NICODERM CQ) 14 mg/24hr TD 24 hr patch 1 patch, 1 patch, Transdermal, Daily, Roldan Gates MD    ondansetron (ZOFRAN) injection 4 mg, 4 mg, Intravenous, Q6H PRN, Mary Ellen Garcia MD    oxyCODONE (ROXICODONE) IR tablet 2 5 mg, 2 5 mg, Oral, Q4H PRN, Luis Gonzales MD    oxyCODONE (ROXICODONE) IR tablet 5 mg, 5 mg, Oral, Q4H PRN, Jaylene Rucker Terrell Krause MD, 5 mg at 06/01/21 1118    polyethylene glycol (MIRALAX) packet 17 g, 17 g, Oral, Daily PRN, Hina Cardoza MD, 17 g at 05/29/21 0209    predniSONE tablet 40 mg, 40 mg, Oral, Daily, Getachew Toribio DO, 40 mg at 06/01/21 1105    senna-docusate sodium (SENOKOT S) 8 6-50 mg per tablet 1 tablet, 1 tablet, Oral, HS, Lalo Wilson MD, 1 tablet at 05/31/21 2222    tolvaptan [facility]) split tablet 7 5 mg, 7 5 mg, Oral, Once, Luis Armando Garcia DO    Laboratory Results:  Results from last 7 days   Lab Units 06/01/21  0511 05/31/21  2145 05/31/21  0503 05/30/21  0529 05/29/21  0444 05/28/21  0501 05/27/21  1708 05/27/21  0155  05/26/21  0429   WBC Thousand/uL 5 28  --  5 78 4 60 5 11 4 71  --  6 38  --  6 00   HEMOGLOBIN g/dL 8 0* 8 2* 7 7* 8 0* 7 8* 8 0* 8 1* 7 1*   < > 7 6*   HEMATOCRIT % 25 5* 26 1* 24 5* 25 6* 25 1* 25 4* 25 9* 22 7*   < > 24 6*   PLATELETS Thousands/uL 190  --  187 177 158 146*  --  164  --  191   POTASSIUM mmol/L 3 8  --  4 0 4 2 3 9 4 2  --  4 6  --  4 8   CHLORIDE mmol/L 95*  --  98* 99* 98* 98*  --  97*  --  96*   CO2 mmol/L 31  --  30 27 32 30  --  33*  --  28   BUN mg/dL 36*  --  40* 47* 52* 65*  --  79*  --  85*   CREATININE mg/dL 1 44*  --  1 47* 1 57* 1 51* 1 70*  --  1 86*  --  2 08*   CALCIUM mg/dL 9 4  --  9 0 9 0 9 0 9 2  --  9 1  --  9 1   MAGNESIUM mg/dL 2 4  --   --   --  2 7*  --   --  2 7*  --   --     < > = values in this interval not displayed

## 2021-06-01 NOTE — UTILIZATION REVIEW
Continued Stay Review    Date: 05/31/2021                         Current Patient Class: Inpatient  Current Level of Care: Med/Surg    HPI:61 y o  male initially admitted on 05/26/2021  Recurrent right sided pleural effusion  Patient is s/p R 8fr chest tube placed by IR on 5/22  Now s/p tube upsize to 12 Armenian catheter by IR on 5/27  Assessment/Plan:  05/31/2021  Maintain chest tube with out air leak to -20 cm suction  He will need a decortication to better expand his lower lobe  Will ask Cardiology and GI to weigh in on risk assessment given his heart failure and cirrhosis diagnosis  Given his low output over the last 24 hours I do not think he has a true hepatic hydrothorax  Additionally, his cirrhosis seems very well compensated with fairly normal LFTs, platelet count  While his INR had been elevated previously this was related to his Coumadin use  Maintain off of Coumadin  Likely surgery on Wednesday afternoon 05/30/2021  Continue suction  Obtain Chest CT  Aggressive Pulmonary toilet  05/29/202  Repeat tPA,  Maintain chest tubes to suction -20cm  Output 1375ML SS  Analgesia Prn  Pulmonary Toilet, IS       05/28/2021  Maintain chest tube to suction  Possible lytic therapy today with TPA/DNAse will discuss with team this morning  If no resolution with lytic therapy consider VATS        Vital Signs:   Date/Time  Temp  Pulse  Resp  BP  MAP (mmHg)  SpO2  O2 Device  Patient Position - Orthostatic VS   05/31/21 22:25:48  100 °F (37 8 °C)  113Abnormal   20  97/64  75  94 %  --  --   05/31/21 2000  --  --  --  --  --  --  None (Room air)  --   05/31/21 19:47:39  99 3 °F (37 4 °C)  --  --  104/55  71  --  --  --   05/31/21 1700  98 9 °F (37 2 °C)  118Abnormal   18  112/75  --  --  --  --   05/31/21 1633  97 9 °F (36 6 °C)  108Abnormal   18  115/69  --  97 %  None (Room air)  --   05/31/21 15:36:07  98 2 °F (36 8 °C)  124Abnormal   19  112/68  83  96 %  --  --   05/31/21 0900  --  --  -- 109/67  81  --  --  --   05/31/21 0800  --  --  --  --  --  --  None (Room air)  --   05/31/21 06:52:23  98 3 °F (36 8 °C)  110Abnormal   20  108/66  80  99 %  --  --   05/31/21 0339  --  --  --  --  --  --  None (Room air)  --   05/31/21 03:36:09  98 4 °F (36 9 °C)  110Abnormal   18  105/63  77  97 %  --  Sitting     05/30/2021 @ 1026  CT chest:  Pigtail catheter in right posterior costophrenic sulcus with small loculated right hydropneumothorax, possibly ex vacuo  Masslike opacities in the right middle and right lower lobe due to benign rounded atelectasis  Healed/healing fractures of the anterior right 3rd through 7th ribs, visible on 5/26/2021, new since 2/22/2021   Correlate with recent trauma  Pulmonary artery enlargement which can be seen with pulmonary hypertension  05/31/2021 @ 1431  Right wrist X:  No acute osseous abnormality       Pertinent Labs/Diagnostic Results:     Results from last 7 days   Lab Units 06/01/21  0511 05/31/21  2145 05/31/21  0503 05/30/21  0529 05/29/21  0444 05/28/21  0501   WBC Thousand/uL 5 28  --  5 78 4 60 5 11 4 71   HEMOGLOBIN g/dL 8 0* 8 2* 7 7* 8 0* 7 8* 8 0*   HEMATOCRIT % 25 5* 26 1* 24 5* 25 6* 25 1* 25 4*   PLATELETS Thousands/uL 190  --  187 177 158 146*   NEUTROS ABS Thousands/µL 4 06  --  4 60  --   --  3 47     Results from last 7 days   Lab Units 06/01/21  0511 05/31/21  0503 05/30/21  0529 05/29/21  0444 05/28/21  0501 05/27/21  0155   SODIUM mmol/L 130* 132* 132* 134* 133* 133*   POTASSIUM mmol/L 3 8 4 0 4 2 3 9 4 2 4 6   CHLORIDE mmol/L 95* 98* 99* 98* 98* 97*   CO2 mmol/L 31 30 27 32 30 33*   ANION GAP mmol/L 4 4 6 4 5 3*   BUN mg/dL 36* 40* 47* 52* 65* 79*   CREATININE mg/dL 1 44* 1 47* 1 57* 1 51* 1 70* 1 86*   EGFR ml/min/1 73sq m 52 50 47 49 42 38   CALCIUM mg/dL 9 4 9 0 9 0 9 0 9 2 9 1   MAGNESIUM mg/dL 2 4  --   --  2 7*  --  2 7*     Results from last 7 days   Lab Units 06/01/21  0511 05/31/21  0503   AST U/L 19 19   ALT U/L 18 18   ALK PHOS U/L 187* 190*   TOTAL PROTEIN g/dL 7 9 7 8   ALBUMIN g/dL 2 9* 2 8*   TOTAL BILIRUBIN mg/dL 1 27* 0 66   BILIRUBIN DIRECT mg/dL  --  0 32*     Results from last 7 days   Lab Units 06/01/21  0558 05/30/21  0606 05/29/21  2103   POC GLUCOSE mg/dl 132 118 140     Results from last 7 days   Lab Units 06/01/21  0511 05/31/21  0503 05/30/21  0529 05/29/21  0444 05/28/21  0501 05/27/21  0155 05/26/21  0429   GLUCOSE RANDOM mg/dL 121 127 109 116 107 142* 110     Results from last 7 days   Lab Units 05/27/21  1209   PROTIME seconds 16 8*   INR  1 36*     Medications:   Scheduled Medications:  carvedilol, 6 25 mg, Oral, BID With Meals  ergocalciferol, 50,000 Units, Oral, Weekly  midodrine, 10 mg, Oral, TID AC  nicotine, 1 patch, Transdermal, Daily  senna-docusate sodium, 1 tablet, Oral, HS      Continuous IV Infusions:     PRN Meds:  acetaminophen, 650 mg, Oral, Q6H PRN  calcium carbonate, 1,000 mg, Oral, Daily PRN  HYDROmorphone, 0 2 mg, Intravenous, Q4H PRN  lidocaine, , Topical, Daily PRN  menthol-methyl salicylate, , Apply externally, 4x Daily PRN  metoprolol, 2 5 mg, Intravenous, Q6H PRN  ondansetron, 4 mg, Intravenous, Q6H PRN  oxyCODONE, 2 5 mg, Oral, Q4H PRN  oxyCODONE, 5 mg, Oral, Q4H PRN  polyethylene glycol, 17 g, Oral, Daily PRN        Discharge Plan: D    Network Utilization Review Department  ATTENTION: Please call with any questions or concerns to [phone] and carefully listen to the prompts so that you are directed to the right person  All voicemails are confidential   Rosi Skipper all requests for admission clinical reviews, approved or denied determinations and any other requests to dedicated fax number below belonging to the campus where the patient is receiving treatment   List of dedicated fax numbers for the Facilities:  [address] DENIALS (Administrative/Medical Necessity) [phone]   [address] (Maternity/NICU/Pediatrics) [phone]   [address] [facility] [address] [facility] [address] [place] [address] [facility] [id number] [address] [place] [address] [phone] [place] [address] [phone]

## 2021-06-01 NOTE — ANESTHESIA PREPROCEDURE EVALUATION
Procedure:  THORACOSCOPY VIDEO ASSISTED SURGERY (VATS) (Right Chest)  DECORTICATION LUNG (Right Chest)    Relevant Problems   ANESTHESIA (within normal limits)   (-) History of anesthesia complications      CARDIO   (+) Atrial fibrillation (HCC)   (+) Hypertensive nephrosclerosis   (+) Pulmonary hypertension (HCC)      ENDO   (+) Type 2 diabetes mellitus with hyperglycemia, without long-term current use of insulin (HCC)      GI/HEPATIC   (+) 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 ascites (HCC)      /RENAL   (+) Acute renal failure superimposed on stage 3b chronic kidney disease (HCC)   (+) Hypertensive nephrosclerosis      HEMATOLOGY   (+) Chronic anemia   (+) Thrombocytopenia (HCC)      MUSCULOSKELETAL (within normal limits)      NEURO/PSYCH (within normal limits)      PULMONARY   (+) STEFF (obstructive sleep apnea)   (+) Pleural effusion on right   (+) SOB (shortness of breath)   (+) Smoking      Other   (+) Chronic diastolic (congestive) heart failure (HCC)   (+) Lesion of spleen        Physical Exam    Airway    Mallampati score: II  TM Distance: >3 FB  Neck ROM: full     Dental   No notable dental hx     Cardiovascular  Rhythm: irregular, Rate: normal,     Pulmonary  Pulmonary exam normal Breath sounds clear to auscultation,     Other Findings        Anesthesia Plan  ASA Score- 3     Anesthesia Type- general with ASA Monitors  Additional Monitors:   Airway Plan: ETT  Plan Factors-    Chart reviewed  EKG reviewed  Imaging results reviewed  Existing labs reviewed  Patient summary reviewed  Patient is a current smoker  Patient did not smoke on day of surgery  Induction- intravenous  Postoperative Plan-   Planned trial extubation    Informed Consent- Anesthetic plan and risks discussed with patient  I personally reviewed this patient with the CRNA  Discussed and agreed on the Anesthesia Plan with the CRNA  Angelika Sarkar         Echo (2/22/21):  LEFT VENTRICLE:  Systolic function was normal  Ejection fraction was estimated to be 60 %  There were no regional wall motion abnormalities  Wall thickness was mildly increased      RIGHT VENTRICLE:  The ventricle was moderately dilated  Systolic function was mildly reduced      LEFT ATRIUM:  The atrium was mildly dilated      RIGHT ATRIUM:  The atrium was moderately dilated      MITRAL VALVE:  There was mild regurgitation      TRICUSPID VALVE:  There was moderate regurgitation  Estimated peak PA pressure was 52 mmHg  NPO verified  NKDA  Preoperative glucose was 132 this morning  Patient received PO carvedilol today, 6/1/21  Plan:  GETA, MANISHA    Risks and benefits of general anesthesia were discussed with the patient  Discussed risks of anesthesia including, but not limited to, the risk of dental injury, n/v, sore throat, corneal abrasions, and arrhythmias  Less common risks including MI, stroke, and death were also discussed  All questions were answered  Anesthesia consent was obtained from the patient

## 2021-06-01 NOTE — ASSESSMENT & PLAN NOTE
Patient with pain in his wrist after waking up on 05/31  Has been 10/10 at times  X-ray negative for osseous abnormalities  Wrist splint with minimal relief  Patient unable to receive NSAIDs in the setting of renal disease      Plan:  · S/p Trial 1 time dose of prednisone 40 mg  · Per Nephrology can use daily colchicine  · Colchicine 0 6mg daily ordered, allopurinol 100 mg BID  · Follow-up with PCP outpatient

## 2021-06-01 NOTE — PHYSICAL THERAPY NOTE
Physical Therapy Treatment note     Patient Name: Steve Blackman    YSAMV'N Date: 6/1/2021     Problem List  Principal Problem:    Pleural effusion on right  Active Problems:    Atrial fibrillation (HCC)    Chronic diastolic (congestive) heart failure (HCC)    Type 2 diabetes mellitus with diabetic chronic kidney disease (HCC)    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 ascites (HCC)    CKD (chronic kidney disease) stage 3, GFR 30-59 ml/min (HCC)    Chronic anemia    Acute kidney injury superimposed on CKD ([facility] )    Hypotension       Past Medical History  Past Medical History:   Diagnosis Date    A-fib [facility])     Cardiac disease     Chronic combined systolic (congestive) and diastolic (congestive) heart failure (HCC)     Diabetes mellitus ([facility] )     Dilated cardiomyopathy ([facility] )     History of echocardiogram 11/24/2014    EF 0 30 (30%), Likely mod LV systolic dysfunction  Likely RV dysfunction as well   History of Lexiscan MPI 02/19/2016    EF 0 43 (43%), no prior MI or ischemia   Hx of echocardiogram 04/28/2017    Normal EF, Normal LV systolic function  Mild concentric LV hypertrophy  Mild mitral and tricuspid regurg      Hx: recurrent pneumonia     Hypertension     Long term (current) use of anticoagulants     Morbid (severe) obesity due to excess calories (HCC)     Neuropathy         Past Surgical History  Past Surgical History:   Procedure Laterality Date    HERNIA REPAIR      IR CHEST TUBE CHECK/CHANGE/REPOSITION/UPSIZE  5/27/2021    IR CHEST TUBE PLACEMENT  5/22/2021    IR THORACENTESIS  8/25/2020    IR THORACENTESIS  2/23/2021    SPLENECTOMY, TOTAL      VASCULAR SURGERY Right     leg         06/01/21 1506   PT Last Visit   PT Visit Date 06/01/21   Note Type   Note Type Treatment   Pain Assessment   Pain Assessment Tool FLACC   Pain Rating: FLACC (Rest) - Face 0   Pain Rating: FLACC (Rest) - Legs 0   Pain Rating: FLACC (Rest) - Activity 0   Pain Rating: FLACC (Rest) - Cry 0   Pain Rating: FLACC (Rest) - Consolability 0   Score: FLACC (Rest) 0   Pain Rating: FLACC (Activity) - Face 0   Pain Rating: FLACC (Activity) - Legs 0   Pain Rating: FLACC (Activity) - Activity 0   Pain Rating: FLACC (Activity) - Cry 0   Pain Rating: FLACC (Activity) - Consolability 0   Score: FLACC (Activity) 0   Restrictions/Precautions   Weight Bearing Precautions Per Order No  (nothing stated in chart )   Braces or Orthoses   (R wrist splint )   Other Precautions   (chest tube to suction, ok by RN to amb off suction)   General   Chart Reviewed Yes   Family/Caregiver Present No   Cognition   Overall Cognitive Status WFL   Arousal/Participation Alert; Responsive   Attention Attends with cues to redirect   Orientation Level Oriented X4   Memory Within functional limits   Following Commands Follows all commands and directions without difficulty   Bed Mobility   Additional Comments pt sitting in chair at the beigning of the session   Transfers   Sit to Stand 5  Supervision   Stand to Sit 5  Supervision   Ambulation/Elevation   Gait pattern Excessively slow; Foward flexed; Shuffling   Gait Assistance 5  Supervision   Assistive Device None   Distance 20ftx1, [id number]   Stair Management Assistance 4  Minimal assist   Additional items Assist x 1   Stair Management Technique One rail L   Number of Stairs 7   Balance   Static Sitting Normal   Dynamic Sitting Good   Static Standing Fair +   Dynamic Standing Fair +   Ambulatory Fair   Endurance Deficit   Endurance Deficit Yes   Activity Tolerance   Activity Tolerance Patient limited by fatigue   Nurse Made Aware nurse approved therapy session   Exercises   Hip Flexion Bilateral;Sitting  (12 reps x 3 set )   Hip Abduction Sitting;Bilateral  (30 rpes x 3 sets )   Knee AROM Long Arc Quad Sitting;Bilateral  (12 reps x3 sets )   Ankle Pumps Sitting;Bilateral  (30 reps x 3 sets )   Balance training  5x3 sit to stands L UE support only    Assessment Prognosis Good   Problem List Decreased endurance; Impaired balance;Decreased mobility   Assessment Pt presents to therapy today with reduced mobility, high risk of falling, poor activity tolerance, pain in R wrist when touched  These impairments limit the patient by requiring rest breaks post activity   Pt would benefit from continued skilled therapy while in the hospital to improve overall mobility and work towards a safe d/c  Recommend home  At end of session patient was left seated with call bell within reach  Pt has R wrist splint on at beginning of the session  WB status not clear however, did not require the use of a RW  Pt educated about the importance of ambulating x4 a day and performing seated exercises  The patient's AM-PAC Basic Mobility Inpatient Short Form Raw Score is 22, Standardized Score is 47 4  A standardized score greater than 42 9 suggests the patient may benefit from discharge to home  Please also refer to the recommendation of the Physical Therapist for safe discharge planning  Plan to see Pt post op to ensure mobility is fine to continue to d c home with no needs  Goals   STG Expiration Date 06/06/21   PT Treatment Day 2   Plan   Treatment/Interventions Functional transfer training;LE strengthening/ROM; Elevations; Therapeutic exercise; Endurance training;Bed mobility;Gait training   Progress Progressing toward goals   PT Frequency Other (Comment)  (3-5xwk)   Recommendation   PT Discharge Recommendation No rehabilitation needs   PT - OK to Discharge Yes   Additional Comments when medically stable   AM-PAC Basic Mobility Inpatient   Turning in Bed Without Bedrails 4   Lying on Back to Sitting on Edge of Flat Bed 4   Moving Bed to Chair 4   Standing Up From Chair 4   Walk in Room 3   Climb 3-5 Stairs 3   Basic Mobility Inpatient Raw Score 22   Basic Mobility Standardized Score 47 4   Greg Daley, PT, DPT

## 2021-06-01 NOTE — PROGRESS NOTES
[facility]    Cardiology Progress Note  Yuniel Estimable 57 y o  male   YOB: 1960 MRN: [id number]  Unit/Bed#: [facility] 422-01 Encounter: [id number]      Subjective:   No common complains of chest pain, shortness of breath or palpitations  Continues to have some discomfort related to the tube  Assessments  69-year-old gentleman originally presented to [facility] with recurrent right pleural effusion s/p thoracocentesis (8/25/20, 2/23/21, 5/22/21)  He underwent thoracocentesis, and subsequently had continued bloody fluid fluid drainage, and as aresult was transferred to [facility] for thoracic surgical evaluation  Principal Problem:    Pleural effusion on right  Active Problems:    Atrial fibrillation (HCC)    Chronic diastolic (congestive) heart failure (HCC)    Type 2 diabetes mellitus with diabetic chronic kidney disease (HCC)    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 ascites (HCC)    CKD (chronic kidney disease) stage 3, GFR 30-59 ml/min (HCC)    Chronic anemia    Acute kidney injury superimposed on CKD (HCC)    Hypotension        Plan  Atrial fibrillation, Pre-op  · Awaiting VATS decortication tomorrow for trapped RLL  · Echo - 2/22/21 - EF 60%, moderately dilated RV with mildly reduced function, moderate TR, moderate pulmonary hypertension  · ECG - 5/22/21 - Afib with RVR  · HR remains slightly elevated in 100-110s  · BP borderline - on midodrine 10 tid - this appears to be new this admission due to earlier hyoptension  · He was on carvedilol 6 25 mg bid, but was not getting most of the doses due to hold parameters  · Carvedilol increased to 12 5 mg bid today by primary service  · He did not get most of the doses of carvedilol over the last week and his BP is borderline - I will lower carvedilol back to 6 25 mg bid with a lower hold parameter   Can uptitrate further if needed, as tolerated  · He is volume overloaded, and is likely going to need more diuresis postop    Chronic diastolic heart failure  · Home diuretics include Lasix 80 mg b i d  + metolazone 5 mg twice a week  · Diuretics currently on hold due to concerns for hypertension and bleeding  · He appears to be getting volume overloaded and has significant bilateral lower extremity edema, which patient insisted is as a result of and sitting in bed all day  · Oral Lasix has been on hold since 05/27, and he has received 2 doses of IV Lasix 40 over the last 2 days  · I suspect he will need more diuresis post surgery - possibly IV lasix 80      Review of Systems   All other systems reviewed and are negative  Telemetry Review: Afib, -120s    Objective:   Vitals: Blood pressure 123/80, pulse (!) 120, temperature 99 4 °F (37 4 °C), resp  rate 18, weight 130 kg (287 lb 11 2 oz), SpO2 95 %  , Body mass index is 40 13 kg/m² ,   Orthostatic Blood Pressures      Most Recent Value   Blood Pressure  123/80 filed at 06/01/2021 1057   Patient Position - Orthostatic VS  Sitting filed at 06/01/2021 4937         Systolic (37LVL), MTN:178 , Min:97 , OZQ:426     Diastolic (03HRY), JVI:92, Min:55, Max:83    Wt Readings from Last 5 Encounters:   06/01/21 130 kg (287 lb 11 2 oz)   05/26/21 131 kg (288 lb 12 8 oz)   05/22/21 (!) 136 kg (300 lb 0 7 oz)   04/22/21 (!) 137 kg (302 lb)   04/08/21 127 kg (279 lb 9 6 oz)     I/O       05/30 0701 - 05/31 0700 05/31 0701 - 06/01 0700 06/01 0701 - 06/02 0700    P  O  960 1060 300    Blood  379 2     Total Intake(mL/kg) 960 (7 3) 1439 2 (11 1) 300 (2 3)    Urine (mL/kg/hr) 2375 (0 8) 1825 (0 6)     Chest Tube 310 255 70    Total Output 2685 2080 70    Net -1725 -640 8 +230                     Physical Exam  Vitals signs and nursing note reviewed  Constitutional:       General: He is not in acute distress  Appearance: He is well-developed  He is obese  He is not ill-appearing or diaphoretic  HENT:      Head: Normocephalic and atraumatic  Nose: No congestion  Eyes:      General: No scleral icterus  Conjunctiva/sclera: Conjunctivae normal    Neck:      Musculoskeletal: Neck supple  No muscular tenderness  Vascular: No carotid bruit or JVD  Cardiovascular:      Rate and Rhythm: Normal rate  Rhythm irregular  Heart sounds: Normal heart sounds  No murmur  No friction rub  No gallop  Pulmonary:      Effort: Pulmonary effort is normal  No respiratory distress  Breath sounds: Normal breath sounds  No wheezing or rales  Comments: Chest tube noted in place  Chest:      Chest wall: No tenderness  Abdominal:      General: Abdomen is protuberant  There is no distension  Palpations: Abdomen is soft  Tenderness: There is no abdominal tenderness  Musculoskeletal:         General: Swelling present  No tenderness or deformity  Right lower leg: Edema present  Left lower leg: Edema present  Skin:     General: Skin is warm  Neurological:      General: No focal deficit present  Mental Status: He is alert and oriented to person, place, and time  Mental status is at baseline  Psychiatric:         Mood and Affect: Mood normal          Behavior: Behavior normal          Thought Content:  Thought content normal          Laboratory Results: personally reviewed        CBC with diff:   Results from last 7 days   Lab Units 06/01/21  0511 05/31/21  2145 05/31/21  0503 05/30/21  0529 05/29/21  0444 05/28/21  0501 05/27/21  1708 05/27/21  0155  05/26/21  0429   WBC Thousand/uL 5 28  --  5 78 4 60 5 11 4 71  --  6 38  --  6 00   HEMOGLOBIN g/dL 8 0* 8 2* 7 7* 8 0* 7 8* 8 0* 8 1* 7 1*   < > 7 6*   HEMATOCRIT % 25 5* 26 1* 24 5* 25 6* 25 1* 25 4* 25 9* 22 7*   < > 24 6*   MCV fL 97  --  96 96 97 97  --  97  --  98   PLATELETS Thousands/uL 190  --  187 177 158 146*  --  164  --  191   MCH pg 30 4  --  30 2 30 0 30 2 30 4  --  30 2  --  30 3   MCHC g/dL 31 4  --  31 4 31 3* 31 1* 31 5  --  31 3*  --  30 9*   RDW % 16 4*  --  16 6* 16 6* 16 9* 17 2*  --  17 7*  --  17 7*   MPV fL 10 4  -- 10 2 10 4 10 5 10 0  --  10 7  --  11 2   NRBC AUTO /100 WBCs 0  --  0  --   --  0  --   --   --  0    < > = values in this interval not displayed  CMP:  Results from last 7 days   Lab Units 06/01/21  0511 05/31/21  0503 05/30/21  0529 05/29/21 0444 05/28/21  0501 05/27/21  0155 05/26/21  0429   POTASSIUM mmol/L 3 8 4 0 4 2 3 9 4 2 4 6 4 8   CHLORIDE mmol/L 95* 98* 99* 98* 98* 97* 96*   CO2 mmol/L 31 30 27 32 30 33* 28   BUN mg/dL 36* 40* 47* 52* 65* 79* 85*   CREATININE mg/dL 1 44* 1 47* 1 57* 1 51* 1 70* 1 86* 2 08*   CALCIUM mg/dL 9 4 9 0 9 0 9 0 9 2 9 1 9 1   AST U/L 19 19  --   --   --   --   --    ALT U/L 18 18  --   --   --   --   --    ALK PHOS U/L 187* 190*  --   --   --   --   --    EGFR ml/min/1 73sq m 52 50 47 49 42 38 33         BMP:  Results from last 7 days   Lab Units 06/01/21 0511 05/31/21 0503 05/30/21 0529 05/29/21 0444 05/28/21  0501 05/27/21 0155 05/26/21  0429   POTASSIUM mmol/L 3 8 4 0 4 2 3 9 4 2 4 6 4 8   CHLORIDE mmol/L 95* 98* 99* 98* 98* 97* 96*   CO2 mmol/L 31 30 27 32 30 33* 28   BUN mg/dL 36* 40* 47* 52* 65* 79* 85*   CREATININE mg/dL 1 44* 1 47* 1 57* 1 51* 1 70* 1 86* 2 08*   CALCIUM mg/dL 9 4 9 0 9 0 9 0 9 2 9 1 9 1       BNP: No results for input(s): BNP in the last 72 hours      Magnesium:   Results from last 7 days   Lab Units 06/01/21 0511 05/29/21 0444 05/27/21  0155   MAGNESIUM mg/dL 2 4 2 7* 2 7*       Coags:   Results from last 7 days   Lab Units 05/27/21  1209   INR  1 36*       TSH:        Hemoglobin A1C       Lipid Profile:       Meds/Allergies   all current active meds have been reviewed and current meds:   Current Facility-Administered Medications   Medication Dose Route Frequency    acetaminophen (TYLENOL) tablet 650 mg  650 mg Oral Q6H PRN    calcium carbonate (TUMS) chewable tablet 1,000 mg  1,000 mg Oral Daily PRN    carvedilol (COREG) tablet 12 5 mg  12 5 mg Oral BID With Meals    ergocalciferol (VITAMIN D2) capsule 50,000 Units  50,000 Units Oral Weekly    HYDROmorphone HCl (DILAUDID) injection 0 2 mg  0 2 mg Intravenous Q4H PRN    lidocaine (LMX) 4 % cream   Topical Daily PRN    menthol-methyl salicylate (BENGAY) 74-69 % cream   Apply externally 4x Daily PRN    metoprolol (LOPRESSOR) injection 2 5 mg  2 5 mg Intravenous Q6H PRN    midodrine (PROAMATINE) tablet 10 mg  10 mg Oral TID AC    nicotine (NICODERM CQ) 14 mg/24hr TD 24 hr patch 1 patch  1 patch Transdermal Daily    ondansetron (ZOFRAN) injection 4 mg  4 mg Intravenous Q6H PRN    oxyCODONE (ROXICODONE) IR tablet 2 5 mg  2 5 mg Oral Q4H PRN    oxyCODONE (ROXICODONE) IR tablet 5 mg  5 mg Oral Q4H PRN    polyethylene glycol (MIRALAX) packet 17 g  17 g Oral Daily PRN    predniSONE tablet 40 mg  40 mg Oral Daily    senna-docusate sodium (SENOKOT S) 8 6-50 mg per tablet 1 tablet  1 tablet Oral HS    tolvaptan (SAMSCA) split tablet 7 5 mg  7 5 mg Oral Once     Medications Prior to Admission   Medication    carvedilol (COREG) 6 25 mg tablet    docusate sodium (COLACE) 100 mg capsule    ergocalciferol (ERGOCALCIFEROL) 1 25 MG (02982 UT) capsule    ferrous sulfate 324 (65 Fe) mg    furosemide (LASIX) 80 mg tablet    glucose blood test strip    Lancets (freestyle) lancets    lisinopril (ZESTRIL) 5 mg tablet    metolazone (ZAROXOLYN) 5 mg tablet    omeprazole (PriLOSEC) 20 mg delayed release capsule    polyethylene glycol (MIRALAX) 17 g packet    potassium chloride (K-DUR,KLOR-CON) 20 mEq tablet    warfarin (COUMADIN) 5 mg tablet            Cardiac testing: reviewed  Results for orders placed during the hospital encounter of 21   Echo complete with contrast if indicated    Narrative [id number] [facility] [address] [address]  [phone]    Transthoracic Echocardiogram  2D, M-mode, Doppler, and Color Doppler    Study date:  2021    Patient: Julia Frye  MR number: [id number]  Account number: [de-identified]  : 1960  Age: 57 years  Gender: Male  Status: Inpatient  Location: Bedside  Height: 71 in  Weight: 350 lb  BP: 130/ 80 mmHg    Indications: shortness of breath    Diagnoses: 428 0 - CONGESTIVE HEART FAILURE    Sonographer:  LATASHA Banks  Primary Physician:  Becky Miranda DO  Referring Physician:  Ragena Olszewski, DO  Group:  [facility]  Interpreting Physician:  Janina Callahan DO    SUMMARY    LEFT VENTRICLE:  Systolic function was normal  Ejection fraction was estimated to be 60 %  There were no regional wall motion abnormalities  Wall thickness was mildly increased  RIGHT VENTRICLE:  The ventricle was moderately dilated  Systolic function was mildly reduced  LEFT ATRIUM:  The atrium was mildly dilated  RIGHT ATRIUM:  The atrium was moderately dilated  MITRAL VALVE:  There was mild regurgitation  TRICUSPID VALVE:  There was moderate regurgitation  Estimated peak PA pressure was 52 mmHg  HISTORY: PRIOR HISTORY: CHF, morbid obesity    PROCEDURE: The procedure was performed at the bedside  This was a routine study  The transthoracic approach was used  The study included complete 2D imaging, M-mode, complete spectral Doppler, and color Doppler  The heart rate was 80 bpm,  at the start of the study  Echocardiographic views were limited due to poor patient compliance and poor acoustic window availability  This was a technically difficult study  LEFT VENTRICLE: Size was normal  Systolic function was normal  Ejection fraction was estimated to be 60 %  There were no regional wall motion abnormalities  Wall thickness was mildly increased  DOPPLER: The study was not technically  sufficient to allow evaluation of LV diastolic function  RIGHT VENTRICLE: The ventricle was moderately dilated  Systolic function was mildly reduced  LEFT ATRIUM: The atrium was mildly dilated  RIGHT ATRIUM: The atrium was moderately dilated      MITRAL VALVE: Valve structure was normal  There was normal leaflet separation  DOPPLER: The transmitral velocity was within the normal range  There was no evidence for stenosis  There was mild regurgitation  AORTIC VALVE: The valve was trileaflet  Leaflets exhibited normal thickness and normal cuspal separation  DOPPLER: Transaortic velocity was within the normal range  There was no evidence for stenosis  There was no regurgitation  TRICUSPID VALVE: The valve structure was normal  There was normal leaflet separation  DOPPLER: The transtricuspid velocity was within the normal range  There was no evidence for stenosis  There was moderate regurgitation  Estimated peak PA  pressure was 52 mmHg  PULMONIC VALVE: Leaflets exhibited normal thickness, no calcification, and normal cuspal separation  DOPPLER: The transpulmonic velocity was within the normal range  There was no regurgitation  PERICARDIUM: There was no pericardial effusion  The pericardium was normal in appearance  AORTA: The root exhibited normal size  SYSTEMIC VEINS: IVC: The inferior vena cava was not well visualized  SYSTEM MEASUREMENT TABLES    2D  %FS: 31 45 %  Ao Diam: 3 22 cm  EDV(Teich): 148 86 ml  EF(Teich): 58 74 %  ESV(Teich): 61 42 ml  IVSd: 1 51 cm  LA Area: 20 63 cm2  LA Diam: 5 65 cm  LVIDd: 5 52 cm  LVIDs: 3 79 cm  LVPWd: 1 08 cm  RA Area: 33 86 cm2  RWT: 0 39  SV(Teich): 87 44 ml    CW  RAP: 0 mmHg  TR Vmax: 3 47 m/s  TR maxP 33 mmHg    PW  E' Sept: 0 09 m/s  E/E' Sept: 13 35  MV A Ti: 0 48 m/s  MV Dec Griggs: 4 5 m/s2  MV DecT: 253 15 ms  MV E Ti: 1 14 m/s  MV E/A Ratio: 2 36  RVSP: 48 33 mmHg    [facility] Accredited Echocardiography Laboratory    Prepared and electronically signed by    Janina Callahan DO  Signed 2021 08:06:00       No results found for this or any previous visit  No results found for this or any previous visit  No results found for this or any previous visit

## 2021-06-01 NOTE — PROGRESS NOTES
Patient visited and anointed by Елена Gilbert     06/01/21 1100   Clinical Encounter Type   Visited With Patient   [facility] Encounters   [facility] Needs Prayer   Sacramental Encounters   Sacrament of Sick-Anointing Anointed

## 2021-06-01 NOTE — PLAN OF CARE
Problem: PHYSICAL THERAPY ADULT  Goal: Performs mobility at highest level of function for planned discharge setting  See evaluation for individualized goals  Description: Treatment/Interventions: Functional transfer training, LE strengthening/ROM, Elevations, Therapeutic exercise, Endurance training, Bed mobility, Gait training, Spoke to nursing, Spoke to case management, OT          See flowsheet documentation for full assessment, interventions and recommendations  Outcome: Progressing  Note: Prognosis: Good  Problem List: Decreased endurance, Impaired balance, Decreased mobility  Assessment: Pt presents to therapy today with reduced mobility, high risk of falling, poor activity tolerance, pain in R wrist when touched  These impairments limit the patient by requiring rest breaks post activity   Pt would benefit from continued skilled therapy while in the hospital to improve overall mobility and work towards a safe d/c  Recommend home  At end of session patient was left seated with call bell within reach  Pt has R wrist splint on at beginning of the session  WB status not clear however, did not require the use of a RW  Pt educated about the importance of ambulating x4 a day and performing seated exercises  The patient's AM-PAC Basic Mobility Inpatient Short Form Raw Score is 22, Standardized Score is 47 4  A standardized score greater than 42 9 suggests the patient may benefit from discharge to home  Please also refer to the recommendation of the Physical Therapist for safe discharge planning  Plan to see Pt post op to ensure mobility is fine to continue to d c home with no needs  PT Discharge Recommendation: No rehabilitation needs     PT - OK to Discharge: Yes    See flowsheet documentation for full assessment

## 2021-06-01 NOTE — PROGRESS NOTES
INTERNAL MEDICINE RESIDENCY PROGRESS NOTE     Name: Maria Fernanda Bellamy   Age & Sex: 57 y o  male   MRN: [id number]  Unit/Bed#: [address] 422-01   Encounter: [id number]  Team: SOD Team C     PATIENT INFORMATION     Name: Maria Fernanda Bellamy   Age & Sex: 57 y o  male   MRN: [id number]  Hospital Stay Days: 6    ASSESSMENT/PLAN     Principal Problem:    Pleural effusion on right  Active Problems:    Acute kidney injury superimposed on CKD [facility])    Atrial fibrillation (HCC)    Chronic diastolic (congestive) heart failure (HCC)    Type 2 diabetes mellitus with diabetic chronic kidney disease (HCC)    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 ascites (HCC)    CKD (chronic kidney disease) stage 3, GFR 30-59 ml/min (HCC)    Chronic anemia    Hypotension    Right wrist pain      * Pleural effusion on right  Assessment & Plan  Patient transferred to [facility] from [facility] for thoracic surgery evaluation  History of recurrent right-sided pleural effusion s/p thoracentesis on 05/22, chest tube placed with continued output of bloody fluid  Fluid-exudative but negative for malignancy, cultures negative      Plan:  · Repeat chest x-ray on 05/24 with continued effusion  · CT scan performed on 05/26 revealed pleural effusion improvement with new hyperdensities consistent with lung hematomas  · Consulted thoracic surgery for evaluation  · Management per thoracic surgery  · S/p tpa/DNAse on 5 29 with good output  · CT of the chest on 05/30 revealed a small loculated right hydropneumothorax and benign rounded atelectasis  · Plan for VATS/pleurodesis on Wednesday 6/2  · Cont monitoring the output  · IS  · Pain control  · Bowel regimen in place    Acute kidney injury superimposed on CKD [facility])  Assessment & Plan  Likely secondary to episodes of hypotension with anemia vs cardiorenal     Plan:  · Nephrology following, appreciate reocmmendations  · Baseline Cr 1 5-1 8, admitted with Cr of 2 8 on 5/21/2021  · Cr improved to baseline of 1 44  · Patient started on midodrine  · Continue holding home med Lisinopril 5mg for now  · Ultimately will require outpatient follow up with pt's nephrologist Dr Shreya Prieto    Atrial fibrillation [facility])  Assessment & Plan  HR elevated due to resp status  History of Afib  Plan:  · On Coreg 6 25mg BID, held with BP on low side  · Chronic anticoagulation with Coumadin 5 milligrams Sunday, Tuesday, Wednesday, Friday, Saturday and 2 5 milligrams Monday and Thursday  · Coumadin currently on hold given anemia and bloody output from chest tube  · Cardiology following, considering digoxin if heart rate stays above 120  · [id number]    Right wrist pain  Assessment & Plan  Patient with pain in his wrist after waking up on 05/31  Has been 10/10 at times  X-ray negative for osseous abnormalities  Wrist splint with minimal relief  Patient unable to receive NSAIDs in the setting of renal disease  Plan:  -Trial 1 time dose of prednisone 40 mg  -Continue monitor    Hypotension  Assessment & Plan  In the setting of output from chest tube plus Afib/CHF medications (Coreg 6 25 BID, Lasix 80mg BID)    Plan:  · Midodrine increased to 10mg TID added by nephrology    Chronic anemia  Assessment & Plan  With baseline hemoglobin approximately 9 5  Now acute on chronic d/t blood loss  Plan:  · Patient received 1 unit of PRBC on 05/25/2021 due to bloody chest tube drainage and hematoma  · Stool occult neg  · Given another 1 U PRBC on 5/27/2021  · Additional unit of packed red blood cells given on 05/31  · Hemoglobin at 8 on 06/01  · Continue to monitor H&H    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 ascites [facility])  Assessment & Plan  Last seen by outpatient GI on 04/08  [facility] [address]  screening is up-to-date  Last EGD did not show any esophageal varices      Plan:  · Suspect fatty liver vs cardiac in nature  · MELD of 18, Child Malloy class B  · GI now on board  · Transjugular liver biopsy along with portal pressure measurements by IR delayed until after thoracic surgery procedure  · Possible elastography for further evaluation    Type 2 diabetes mellitus with diabetic chronic kidney disease [facility])  Assessment & Plan  Lab Results   Component Value Date    HGBA1C 5 5 2021     Plan:   · Controlled hemoglobin A1c  Not on any home meds  Chronic diastolic (congestive) heart failure (HCC)  Assessment & Plan  Wt Readings from Last 3 Encounters:   21 130 kg (287 lb 11 2 oz)   21 131 kg (288 lb 12 8 oz)   21 (!) 136 kg (300 lb 0 7 oz)     Patient appears slightly hypervolemic with LE edema  Followed by Dr Carol Dc OP  Home regimen: lasix 80 mg BID, metolazone 5 mg twice a week  Plan:  · Lasix 80 milligrams b i d -patient has been unable to receive this given low blood pressure  · Will need diuresis postop  · Weight has remained stable   · Continue intake and output and daily weights        Disposition: OR tomorrow     SUBJECTIVE     Patient seen and examined  No acute events overnight  Patient still with right wrist pain  Minimal improvement from yesterday  Will trial 40 mg of prednisone  Lidocaine cream   Denies fevers, chills, night sweats, chest pain, shortness of breath, abdominal pain, nausea, vomiting, diarrhea, constipation, and dysuria  Plan for OR tomorrow with thoracic surgery  OBJECTIVE     Vitals:    21 0645 21 1057 21 1500 21 1507   BP: 106/67 123/80 145/90 145/90   BP Location:       Pulse:    (!) 115   Resp: 18 18  16   Temp: 98 8 °F (37 1 °C) 99 4 °F (37 4 °C)  99 9 °F (37 7 °C)   TempSrc:       SpO2:    95%   Weight:          Temperature:   Temp (24hrs), Av °F (37 2 °C), Min:97 9 °F (36 6 °C), Max:100 °F (37 8 °C)    Temperature: 99 9 °F (37 7 °C)  Intake & Output:  I/O       701 -  0700  07 -  0700  07 -  0700    P  O  960 1060     Blood  379 2     Total Intake(mL/kg) 960 (7 3) 1439 2 (11 1)     Urine (mL/kg/hr) 2375 (0 8) 1825 (0 6)     Chest Tube 310 255     Total Output 2685 2085     Net -1725 -640 8                Weights:        Body mass index is 40 13 kg/m²  Weight (last 2 days)     Date/Time   Weight    06/01/21 0600   130 (287 7)    05/31/21 0600   131 (289 24)    05/30/21 1900   131 (288 8)    05/30/21 0532   131 (288 58)            Physical Exam  Vitals signs and nursing note reviewed  Constitutional:       General: He is not in acute distress  Appearance: He is not ill-appearing  HENT:      Head: Normocephalic and atraumatic  Right Ear: External ear normal       Left Ear: External ear normal       Nose: Nose normal       Mouth/Throat:      Mouth: Mucous membranes are moist       Pharynx: Oropharynx is clear  Eyes:      Extraocular Movements: Extraocular movements intact  Pupils: Pupils are equal, round, and reactive to light  Cardiovascular:      Rate and Rhythm: Tachycardia present  Rhythm irregular  Heart sounds: No murmur  Pulmonary:      Comments: Decreased breath sounds on the right  Abdominal:      General: Bowel sounds are normal  There is no distension  Palpations: Abdomen is soft  Tenderness: There is no abdominal tenderness  Musculoskeletal:      Right lower leg: Edema present  Left lower leg: Edema present  Skin:     General: Skin is warm and dry  Capillary Refill: Capillary refill takes less than 2 seconds  Neurological:      General: No focal deficit present  Mental Status: He is alert and oriented to person, place, and time  Psychiatric:         Mood and Affect: Mood normal          Behavior: Behavior normal        LABORATORY DATA     Labs: I have personally reviewed pertinent reports    Results from last 7 days   Lab Units 06/01/21  0511 05/31/21  2145 05/31/21  0503 05/30/21  0529  05/28/21  0501   WBC Thousand/uL 5 28  --  5 78 4 60   < > 4 71   HEMOGLOBIN g/dL 8 0* 8 2* 7 7* 8 0*   < > 8 0*   HEMATOCRIT % 25 5* 26 1* 24 5* 25 6*   < > 25 4*   PLATELETS Thousands/uL 190  --  187 177   < > 146* NEUTROS PCT % 76*  --  79*  --   --  74   MONOS PCT % 11  --  9  --   --  10    < > = values in this interval not displayed  Results from last 7 days   Lab Units 06/01/21  0511 05/31/21  0503 05/30/21  0529   POTASSIUM mmol/L 3 8 4 0 4 2   CHLORIDE mmol/L 95* 98* 99*   CO2 mmol/L 31 30 27   BUN mg/dL 36* 40* 47*   CREATININE mg/dL 1 44* 1 47* 1 57*   CALCIUM mg/dL 9 4 9 0 9 0   ALK PHOS U/L 187* 190*  --    ALT U/L 18 18  --    AST U/L 19 19  --      Results from last 7 days   Lab Units 06/01/21  0511 05/29/21  0444 05/27/21  0155   MAGNESIUM mg/dL 2 4 2 7* 2 7*          Results from last 7 days   Lab Units 05/27/21  1209   INR  1 36*               IMAGING & DIAGNOSTIC TESTING     Radiology Results: I have personally reviewed pertinent reports  Xr Wrist 2 Vw Right    Result Date: 5/31/2021  Impression: No acute osseous abnormality  Workstation performed: [id number]     Ct Chest Wo Contrast    Result Date: 5/30/2021  Impression: Pigtail catheter in right posterior costophrenic sulcus with small loculated right hydropneumothorax, possibly ex vacuo  Masslike opacities in the right middle and right lower lobe due to benign rounded atelectasis  Healed/healing fractures of the anterior right 3rd through 7th ribs, visible on 5/26/2021, new since 2/22/2021  Correlate with recent trauma  Pulmonary artery enlargement which can be seen with pulmonary hypertension  Workstation performed: [id number]     Ct Chest Wo Contrast    Result Date: 5/26/2021  Impression: Improvement in the prior pleural effusion with new hyperdensities consistent with hematomas with pigtail catheter in place  Atelectasis of the middle lobe and right lower lobe unchanged  Workstation performed: [id number]     Ir Chest Tube Check/change/reposition/upsize    Result Date: 5/27/2021  Impression: Impression: 1  Successful placement of a 12 Yoruba right chest tube under ultrasound and fluoroscopic guidance   Workstation performed: [id number] Other Diagnostic Testing: I have personally reviewed pertinent reports  ACTIVE MEDICATIONS     Current Facility-Administered Medications   Medication Dose Route Frequency    acetaminophen (TYLENOL) tablet 650 mg  650 mg Oral Q6H PRN    calcium carbonate (TUMS) chewable tablet 1,000 mg  1,000 mg Oral Daily PRN    carvedilol (COREG) tablet 6 25 mg  6 25 mg Oral BID With Meals    ergocalciferol (VITAMIN D2) capsule 50,000 Units  50,000 Units Oral Weekly    HYDROmorphone HCl (DILAUDID) injection 0 2 mg  0 2 mg Intravenous Q4H PRN    lidocaine (LMX) 4 % cream   Topical Daily PRN    menthol-methyl salicylate (BENGAY) 95-47 % cream   Apply externally 4x Daily PRN    metoprolol (LOPRESSOR) injection 2 5 mg  2 5 mg Intravenous Q6H PRN    midodrine (PROAMATINE) tablet 10 mg  10 mg Oral TID AC    nicotine (NICODERM CQ) 14 mg/24hr TD 24 hr patch 1 patch  1 patch Transdermal Daily    ondansetron (ZOFRAN) injection 4 mg  4 mg Intravenous Q6H PRN    oxyCODONE (ROXICODONE) IR tablet 2 5 mg  2 5 mg Oral Q4H PRN    oxyCODONE (ROXICODONE) IR tablet 5 mg  5 mg Oral Q4H PRN    polyethylene glycol (MIRALAX) packet 17 g  17 g Oral Daily PRN    senna-docusate sodium (SENOKOT S) 8 6-50 mg per tablet 1 tablet  1 tablet Oral HS    tolvaptan (SAMSCA) split tablet 7 5 mg  7 5 mg Oral Once       VTE Pharmacologic Prophylaxis: Reason for no pharmacologic prophylaxis anemia  VTE Mechanical Prophylaxis: sequential compression device    Portions of the record may have been created with voice recognition software  Occasional wrong word or "sound a like" substitutions may have occurred due to the inherent limitations of voice recognition software    Read the chart carefully and recognize, using context, where substitutions have occurred   ==  Alejandro Ventura, [facility]  Internal Medicine Residency PGY-1

## 2021-06-01 NOTE — PROGRESS NOTES
Progress Note - Thoracic Surgery   Sonja Olmos 57 y o  male MRN: [id number]  Unit/Bed#: [facility] [place] 422-01 Encounter: [id number]    Assessment:  56 y/o M p/w R pleural effusion, s/p IR 8Fr CT placement on 5/22 (now removed), new R 12Fr CT placement by IR on 5/27     R CT to -20 sxn with 180cc from 310 cc dark ss output, -AL  Vitals normal on RA   IS 1000    Plan:  Cardiology and gastroenterology consulted for preoperative clearance, both have stated acceptable risk, ok to hold coumdin per cards, will require further investigation of his cirrhosis per GI but does not have to be before operation  Maintain R CT to suction, monitor output  IS/Pulm toilet  Surgery planning, likely surgery on Wednesday with Dr Shayla Wade of care per primary    Subjective/Objective     Subjective: No complaints this morning, denies shortness of breath    Objective:    Blood pressure 122/83, pulse (!) 120, temperature 98 5 °F (36 9 °C), temperature source Oral, resp  rate 16, weight 131 kg (289 lb 3 9 oz), SpO2 95 %  ,Body mass index is 40 34 kg/m²        Intake/Output Summary (Last 24 hours) at 6/1/2021 0635  Last data filed at 6/1/2021 0314  Gross per 24 hour   Intake 1339 17 ml   Output 2030 ml   Net -690 83 ml       Invasive Devices     Peripheral Intravenous Line            Peripheral IV 05/30/21 Left Forearm 1 day          Drain            Chest Tube Right 12 Fr  4 days                Physical Exam:  Gen:    NAD  CV:      warm, well-perfused  Lungs: No respiratory distress on RA, R CT to suction, no air leak, serosanguinous output  Abd:     soft, NT/ND  Ext:      no CCE  Neuro: A&Ox3

## 2021-06-01 NOTE — PROGRESS NOTES
Gastroenterology Progress Note   Unit/Bed#: [facility] 422-01 Encounter: [id number]        Niki Sweeney 57 y o  male [id number]    Hospital Stay Days: 6      Assessment/Plan:    Principal Problem:    Pleural effusion on right  Active Problems:    Atrial fibrillation (HCC)    Chronic diastolic (congestive) heart failure (HCC)    Type 2 diabetes mellitus with diabetic chronic kidney disease (HCC)    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 ascites (HCC)    CKD (chronic kidney disease) stage 3, GFR 30-59 ml/min (HCC)    Chronic anemia    Acute kidney injury superimposed on CKD (HCC)    Hypotension    Suspected cirrhosis  -patient has followed with Gastroenterology as an outpatient  -etiology of possible cirrhosis may be secondary to fatty liver disease versus alcohol use  -patient has never had formal biopsy and has just had ultrasound right upper quadrant for diagnosis of cirrhosis as an outpatient  Do recommend further workup to clarify if patient does have cirrhosis or not  -EGD 10/2020 with no varices noted  -MELD score 18 ()  -Child Malloy B  -patient does have significant lower extremity edema; recommend elevating lower extremities    Plan  -will order elastography   -if this is not definitive patient could have IR procedure for transjugular biopsy with portal pressure measurements carried out either inpatient or outpatient    -2 g sodium diet    Right pleural effusion  -patient for VATS decortication on Wednesday  -thoracic surgery following    Subjective:   Patient resting comfortably on examination with no complaints         Vitals: Temp (24hrs), Av 8 °F (37 1 °C), Min:97 9 °F (36 6 °C), Max:100 °F (37 8 °C)  Current: Temperature: 98 8 °F (37 1 °C)  Vitals:    21 2225 21 0314 21 0600 21 0645   BP: 97/64 122/83  106/67   BP Location:  Left arm     Pulse: (!) 113 (!) 120     Resp: 20 16  18   Temp: 100 °F (37 8 °C) 98 5 °F (36 9 °C)  98 8 °F (37 1 °C)   TempSrc:  Oral     SpO2: 94% 95%     Weight: 130 kg (287 lb 11 2 oz)     Body mass index is 40 13 kg/m²  I/O last 24 hours: In: 1739 2 [P O :1360; Blood:379 2]  Out: 2100 [Urine:1825; Chest Tube:275]      Physical Exam  Vitals signs and nursing note reviewed  Constitutional:       General: He is not in acute distress  Appearance: He is well-developed  HENT:      Head: Normocephalic and atraumatic  Eyes:      General: No scleral icterus  Conjunctiva/sclera: Conjunctivae normal    Cardiovascular:      Rate and Rhythm: Normal rate and regular rhythm  Heart sounds: Normal heart sounds  No murmur  No friction rub  No gallop  Pulmonary:      Effort: Pulmonary effort is normal  No respiratory distress  Breath sounds: Normal breath sounds  No wheezing or rales  Abdominal:      General: Bowel sounds are normal  There is no distension  Palpations: Abdomen is soft  Tenderness: There is no abdominal tenderness  Musculoskeletal: Normal range of motion  Comments: 4+ lower extremity edema bilaterally   Skin:     General: Skin is warm  Findings: No rash  Neurological:      Mental Status: He is alert and oriented to person, place, and time              Invasive Devices     Peripheral Intravenous Line            Peripheral IV 05/30/21 Left Forearm 2 days          Drain            Chest Tube Right 12 Fr  4 days                          Labs:   Recent Results (from the past 24 hour(s))   Type and screen    Collection Time: 05/31/21  1:41 PM   Result Value Ref Range    ABO Grouping O     Rh Factor Positive     Antibody Screen Negative     Specimen Expiration Date [id number]    Hemoglobin and hematocrit, blood    Collection Time: 05/31/21  9:45 PM   Result Value Ref Range    Hemoglobin 8 2 (L) 12 0 - 17 0 g/dL    Hematocrit 26 1 (L) 36 5 - 49 3 %   CBC and differential    Collection Time: 06/01/21  5:11 AM   Result Value Ref Range    WBC 5 28 4 31 - 10 16 Thousand/uL    RBC 2 63 (L) 3 88 - 5 62 Million/uL    Hemoglobin 8 0 (L) 12 0 - 17 0 g/dL    Hematocrit 25 5 (L) 36 5 - 49 3 %    MCV 97 82 - 98 fL    MCH 30 4 26 8 - 34 3 pg    MCHC 31 4 31 4 - 37 4 g/dL    RDW 16 4 (H) 11 6 - 15 1 %    MPV 10 4 8 9 - 12 7 fL    Platelets 709 994 - 134 Thousands/uL    nRBC 0 /100 WBCs    Neutrophils Relative 76 (H) 43 - 75 %    Immat GRANS % 1 0 - 2 %    Lymphocytes Relative 10 (L) 14 - 44 %    Monocytes Relative 11 4 - 12 %    Eosinophils Relative 2 0 - 6 %    Basophils Relative 0 0 - 1 %    Neutrophils Absolute 4 06 1 85 - 7 62 Thousands/µL    Immature Grans Absolute 0 03 0 00 - 0 20 Thousand/uL    Lymphocytes Absolute 0 50 (L) 0 60 - 4 47 Thousands/µL    Monocytes Absolute 0 56 0 17 - 1 22 Thousand/µL    Eosinophils Absolute 0 11 0 00 - 0 61 Thousand/µL    Basophils Absolute 0 02 0 00 - 0 10 Thousands/µL   Comprehensive metabolic panel    Collection Time: 06/01/21  5:11 AM   Result Value Ref Range    Sodium 130 (L) 136 - 145 mmol/L    Potassium 3 8 3 5 - 5 3 mmol/L    Chloride 95 (L) 100 - 108 mmol/L    CO2 31 21 - 32 mmol/L    ANION GAP 4 4 - 13 mmol/L    BUN 36 (H) 5 - 25 mg/dL    Creatinine 1 44 (H) 0 60 - 1 30 mg/dL    Glucose 121 65 - 140 mg/dL    Calcium 9 4 8 3 - 10 1 mg/dL    Corrected Calcium 10 3 (H) 8 3 - 10 1 mg/dL    AST 19 5 - 45 U/L    ALT 18 12 - 78 U/L    Alkaline Phosphatase 187 (H) 46 - 116 U/L    Total Protein 7 9 6 4 - 8 2 g/dL    Albumin 2 9 (L) 3 5 - 5 0 g/dL    Total Bilirubin 1 27 (H) 0 20 - 1 00 mg/dL    eGFR 52 ml/min/1 73sq m   Magnesium    Collection Time: 06/01/21  5:11 AM   Result Value Ref Range    Magnesium 2 4 1 6 - 2 6 mg/dL   Prepare Leukoreduced RBC: 1 Units    Collection Time: 06/01/21  5:55 AM   Result Value Ref Range    Unit Product Code L6485V14     Unit Number [id number]     Unit ABO O     Unit [facility] POS     Crossmatch Compatible     Unit Dispense Status Presumed Trans    Fingerstick Glucose (POCT)    Collection Time: 06/01/21  5:58 AM   Result Value Ref Range    POC Glucose 132 65 - 140 mg/dl Radiology Results: I have personally reviewed pertinent reports  Other Diagnostic Testing:   I have personally reviewed pertinent reports          Active Meds:   Current Facility-Administered Medications   Medication Dose Route Frequency    acetaminophen (TYLENOL) tablet 650 mg  650 mg Oral Q6H PRN    calcium carbonate (TUMS) chewable tablet 1,000 mg  1,000 mg Oral Daily PRN    carvedilol (COREG) tablet 12 5 mg  12 5 mg Oral BID With Meals    ergocalciferol (VITAMIN D2) capsule 50,000 Units  50,000 Units Oral Weekly    HYDROmorphone HCl (DILAUDID) injection 0 2 mg  0 2 mg Intravenous Q4H PRN    lidocaine (LMX) 4 % cream   Topical Daily PRN    menthol-methyl salicylate (BENGAY) 84-18 % cream   Apply externally 4x Daily PRN    metoprolol (LOPRESSOR) injection 2 5 mg  2 5 mg Intravenous Q6H PRN    midodrine (PROAMATINE) tablet 10 mg  10 mg Oral TID AC    nicotine (NICODERM CQ) 14 mg/24hr TD 24 hr patch 1 patch  1 patch Transdermal Daily    ondansetron (ZOFRAN) injection 4 mg  4 mg Intravenous Q6H PRN    oxyCODONE (ROXICODONE) IR tablet 2 5 mg  2 5 mg Oral Q4H PRN    oxyCODONE (ROXICODONE) IR tablet 5 mg  5 mg Oral Q4H PRN    polyethylene glycol (MIRALAX) packet 17 g  17 g Oral Daily PRN    predniSONE tablet 40 mg  40 mg Oral Daily    senna-docusate sodium (SENOKOT S) 8 6-50 mg per tablet 1 tablet  1 tablet Oral HS         VTE Pharmacologic Prophylaxis: Reason for no pharmacologic prophylaxis hydrothorax   VTE Mechanical Prophylaxis: sequential compression device    Elmer Reynolds DO

## 2021-06-01 NOTE — QUICK NOTE
Per patient's request, family was not called and updated  He stated that he will update his family with any pertinent information      Tomi Baxter DO, [address]  PGY-1, Internal Medicine  [facility]

## 2021-06-02 NOTE — PROGRESS NOTES
INTERNAL MEDICINE RESIDENCY PROGRESS NOTE     Name: Lizette Boyd   Age & Sex: 57 y o  male   MRN: [id number]  Unit/Bed#: OR POOL   Encounter: [id number]  Team: SOD Team C     PATIENT INFORMATION     Name: Lizette Boyd   Age & Sex: 57 y o  male   MRN: [id number]  Hospital Stay Days: 7    ASSESSMENT/PLAN     Principal Problem:    Pleural effusion on right  Active Problems:    Acute kidney injury superimposed on CKD [facility])    Atrial fibrillation (HCC)    Chronic diastolic (congestive) heart failure (HCC)    Type 2 diabetes mellitus with diabetic chronic kidney disease (HCC)    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 ascites (HCC)    CKD (chronic kidney disease) stage 3, GFR 30-59 ml/min (HCC)    Chronic anemia    Hypotension    Right wrist pain      * Pleural effusion on right  Assessment & Plan  [facility] [place] [facility]  History of recurrent right-sided pleural effusion s/p thoracentesis on 05/22, chest tube placed with continued output of bloody fluid  Fluid-exudative but negative for malignancy, cultures negative      Plan:  · Repeat chest x-ray on 05/24 with continued effusion  · CT scan performed on 05/26 revealed pleural effusion improvement with new hyperdensities consistent with lung hematomas  · Consulted thoracic surgery for evaluation  · Management per thoracic surgery  · S/p tpa/DNAse on 5 29 with good output  · CT of the chest on 05/30 revealed a small loculated right hydropneumothorax and benign rounded atelectasis  · Plan for VATS/pleurodesis on Wednesday 6/2  · Cont monitoring the output  · IS  · Pain control  · Bowel regimen in place    Acute kidney injury superimposed on CKD [facility])  Assessment & Plan  Likely secondary to episodes of hypotension with anemia vs cardiorenal     Plan:  · Nephrology following, appreciate reocmmendations  · Baseline Cr 1 5-1 8, admitted with Cr of 2 8 on 5/21/2021  · Cr improved to baseline  · Patient started on midodrine  · Continue holding home med Lisinopril 5mg for now  · Ultimately will require outpatient follow up with pt's nephrologist Dr Mando Dolan    Atrial fibrillation [facility])  Assessment & Plan  HR elevated due to resp status  History of Afib  Plan:  · On Coreg 6 25mg BID, held with BP on low side  · Chronic anticoagulation with Coumadin 5 milligrams Sunday, Tuesday, Wednesday, Friday, Saturday and 2 5 milligrams Monday and Thursday  · Coumadin currently on hold given anemia and bloody output from chest tube  · Cardiology following, considering digoxin if heart rate stays above 120  · [id number] 3    Right wrist pain  Assessment & Plan  Patient with pain in his wrist after waking up on 05/31  Has been 10/10 at times  X-ray negative for osseous abnormalities  Wrist splint with minimal relief  Patient unable to receive NSAIDs in the setting of renal disease  Plan:  -Trial 1 time dose of prednisone 40 mg  -Continue monitor    Hypotension  Assessment & Plan  In the setting of output from chest tube plus Afib/CHF medications (Coreg 6 25 BID, Lasix 80mg BID)    Plan:  · Midodrine increased to 10mg TID added by nephrology    Chronic anemia  Assessment & Plan  With baseline hemoglobin approximately 9 5  Now acute on chronic d/t blood loss  Plan:  · Patient received 1 unit of PRBC on 05/25/2021 due to bloody chest tube drainage and hematoma  · Stool occult neg  · Given another 1 U PRBC on 5/27/2021  · Additional unit of packed red blood cells given on 05/31  · Hemoglobin down to 7 5 on 06/02  · Continue to monitor H&H    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 ascites [facility])  Assessment & Plan  Last seen by outpatient GI on 04/08  [facility] [address]  screening is up-to-date  Last EGD did not show any esophageal varices      Plan:  · Suspect fatty liver vs cardiac in nature  · MELD of 18, Child Malloy class B  · GI now on board  · Transjugular liver biopsy along with portal pressure measurements by IR delayed until after thoracic surgery procedure  · Possible elastography for further evaluation    Type 2 diabetes mellitus with diabetic chronic kidney disease [facility])  Assessment & Plan  Lab Results   Component Value Date    HGBA1C 5 5 2021     Plan:   · Controlled hemoglobin A1c  Not on any home meds  Chronic diastolic (congestive) heart failure (HCC)  Assessment & Plan  Wt Readings from Last 3 Encounters:   21 130 kg (287 lb 7 7 oz)   21 131 kg (288 lb 12 8 oz)   21 (!) 136 kg (300 lb 0 7 oz)     Patient appears slightly hypervolemic with LE edema  Followed by Dr Devyn Arroyo OP  Home regimen: lasix 80 mg BID, metolazone 5 mg twice a week  Plan:  · Lasix 80 milligrams b i d -patient has been unable to receive this given low blood pressure  · Will need diuresis postop  · Weight has remained stable   · Continue intake and output and daily weights        Disposition: To OR today for VATS decortication  SUBJECTIVE     Patient seen and examined  No acute events overnight  Patient sitting up comfortably in his chair  Slightly frustrated that he has to stay NPO for so long  Reports improvement of his right wrist pain  States that his breathing is stable  Denies fevers, chills, night sweats, dysphagia, chest pain, shortness of breath, abdominal pain, nausea, vomiting, diarrhea, constipation, and dysuria  Reports his normal baseline lymphedema  Patient go to OR today for VATS decortication  OBJECTIVE     Vitals:    21 0627 21 0636 21 1053 21 1104   BP: 115/71 116/73 117/74 117/74   Pulse:       Resp:  17  18   Temp:  98 2 °F (36 8 °C)  98 3 °F (36 8 °C)   TempSrc:       SpO2:       Weight:          Temperature:   Temp (24hrs), Av °F (37 2 °C), Min:98 2 °F (36 8 °C), Max:100 °F (37 8 °C)    Temperature: 98 3 °F (36 8 °C)  Intake & Output:  I/O       701 -  0700  07 -  0700  07 -  0700    P  O  1060 522     Blood 379 2      Total Intake(mL/kg) 1439 2 (11 1) 522 (4)     Urine (mL/kg/hr) 1825 (0 6) 1750 (0 6)     Chest Tube 255 141     Total Output 2080 1891     Net -565 9 -4485                Weights:        Body mass index is 40 1 kg/m²  Weight (last 2 days)     Date/Time   Weight    06/02/21 0544   130 (287 48)    06/01/21 0600   130 (287 7)    05/31/21 0600   131 (289 24)            Physical Exam  Vitals signs and nursing note reviewed  Constitutional:       General: He is not in acute distress  Appearance: He is obese  He is not ill-appearing  HENT:      Head: Normocephalic and atraumatic  Right Ear: External ear normal       Left Ear: External ear normal       Nose: Nose normal       Mouth/Throat:      Mouth: Mucous membranes are moist       Pharynx: Oropharynx is clear  Eyes:      Extraocular Movements: Extraocular movements intact  Pupils: Pupils are equal, round, and reactive to light  Cardiovascular:      Rate and Rhythm: Tachycardia present  Rhythm irregular  Heart sounds: No murmur  Pulmonary:      Effort: Pulmonary effort is normal       Comments: Decreased breath sounds on the right side  Abdominal:      General: Bowel sounds are normal  There is no distension  Palpations: Abdomen is soft  Tenderness: There is no abdominal tenderness  Musculoskeletal:      Right lower leg: Edema present  Left lower leg: Edema present  Comments: Improved right wrist tenderness and range of motion; mild swelling; will continue to monitor after surgery   Skin:     General: Skin is warm and dry  Capillary Refill: Capillary refill takes less than 2 seconds  Neurological:      Mental Status: He is alert  LABORATORY DATA     Labs: I have personally reviewed pertinent reports    Results from last 7 days   Lab Units 06/02/21  0440 06/01/21  0511 05/31/21  2145 05/31/21  0503   WBC Thousand/uL 5 56 5 28  --  5 78   HEMOGLOBIN g/dL 7 5* 8 0* 8 2* 7 7*   HEMATOCRIT % 24 0* 25 5* 26 1* 24 5*   PLATELETS Thousands/uL 173 190 --  187   NEUTROS PCT % 82* 76*  --  79*   MONOS PCT % 10 11  --  9      Results from last 7 days   Lab Units 06/02/21  0440 06/01/21  0511 05/31/21  0503   POTASSIUM mmol/L 4 0 3 8 4 0   CHLORIDE mmol/L 98* 95* 98*   CO2 mmol/L 30 31 30   BUN mg/dL 34* 36* 40*   CREATININE mg/dL 1 34* 1 44* 1 47*   CALCIUM mg/dL 9 3 9 4 9 0   ALK PHOS U/L 189* 187* 190*   ALT U/L 18 18 18   AST U/L 18 19 19     Results from last 7 days   Lab Units 06/01/21  0511 05/29/21  0444 05/27/21  0155   MAGNESIUM mg/dL 2 4 2 7* 2 7*          Results from last 7 days   Lab Units 06/02/21  0440 05/27/21  1209   INR  1 18 1 36*   PTT seconds 40*  --                IMAGING & DIAGNOSTIC TESTING     Radiology Results: I have personally reviewed pertinent reports  Xr Wrist 2 Vw Right    Result Date: 5/31/2021  Impression: No acute osseous abnormality  Workstation performed: [id number]     Ct Chest Wo Contrast    Result Date: 5/30/2021  Impression: Pigtail catheter in right posterior costophrenic sulcus with small loculated right hydropneumothorax, possibly ex vacuo  Masslike opacities in the right middle and right lower lobe due to benign rounded atelectasis  Healed/healing fractures of the anterior right 3rd through 7th ribs, visible on 5/26/2021, new since 2/22/2021  Correlate with recent trauma  Pulmonary artery enlargement which can be seen with pulmonary hypertension  Workstation performed: [id number]     Ct Chest Wo Contrast    Result Date: 5/26/2021  Impression: Improvement in the prior pleural effusion with new hyperdensities consistent with hematomas with pigtail catheter in place  Atelectasis of the middle lobe and right lower lobe unchanged  Workstation performed: [id number]     Ir Chest Tube Check/change/reposition/upsize    Result Date: 5/27/2021  Impression: Impression: 1  Successful placement of a 12 Persian right chest tube under ultrasound and fluoroscopic guidance   Workstation performed: [id number]     Other Diagnostic Testing: I have personally reviewed pertinent reports      ACTIVE MEDICATIONS     Current Facility-Administered Medications   Medication Dose Route Frequency    [MAR Hold] acetaminophen (TYLENOL) tablet 650 mg  650 mg Oral Q6H PRN    bupivacaine liposomal (EXPAREL) 1 3 % injection 20 mL  20 mL Infiltration Once    [MAR Hold] calcium carbonate (TUMS) chewable tablet 1,000 mg  1,000 mg Oral Daily PRN    [MAR Hold] carvedilol (COREG) tablet 6 25 mg  6 25 mg Oral BID With Meals    [MAR Hold] ergocalciferol (VITAMIN D2) capsule 50,000 Units  50,000 Units Oral Weekly    [MAR Hold] HYDROmorphone HCl (DILAUDID) injection 0 2 mg  0 2 mg Intravenous Q4H PRN    [MAR Hold] lidocaine (LMX) 4 % cream   Topical Daily PRN    [MAR Hold] menthol-methyl salicylate (BENGAY) 08-41 % cream   Apply externally 4x Daily PRN    [MAR Hold] metoprolol (LOPRESSOR) injection 2 5 mg  2 5 mg Intravenous Q6H PRN    [MAR Hold] midodrine (PROAMATINE) tablet 10 mg  10 mg Oral TID AC    [MAR Hold] nicotine (NICODERM CQ) 14 mg/24hr TD 24 hr patch 1 patch  1 patch Transdermal Daily    [MAR Hold] ondansetron (ZOFRAN) injection 4 mg  4 mg Intravenous Q6H PRN    [MAR Hold] oxyCODONE (ROXICODONE) IR tablet 2 5 mg  2 5 mg Oral Q4H PRN    [MAR Hold] oxyCODONE (ROXICODONE) IR tablet 5 mg  5 mg Oral Q4H PRN    [MAR Hold] polyethylene glycol (MIRALAX) packet 17 g  17 g Oral Daily PRN    [MAR Hold] senna-docusate sodium (SENOKOT S) 8 6-50 mg per tablet 1 tablet  1 tablet Oral HS    sodium chloride 0 9 % infusion  50 mL/hr Intravenous Continuous     Facility-Administered Medications Ordered in Other Encounters   Medication Dose Route Frequency    albumin human (FLEXBUMIN) 5 % injection    Continuous PRN    fentanyl citrate (PF) 100 MCG/2ML   Intravenous PRN    lidocaine (PF) (XYLOCAINE-MPF) 1 % injection    PRN    phenylephrine (JAYSON-SYNEPHRINE) 100 mg (DOUBLE CONCENTRATION) IV in sodium chloride 0 9% 250 mL    Continuous PRN    phenylephrine bolus from bag    PRN    propofol (DIPRIVAN) 200 MG/20ML bolus injection   Intravenous PRN    ROCuronium (ZEMURON) injection    PRN    Succinylcholine Chloride 100 mg/5 mL syringe   Intravenous PRN       VTE Pharmacologic Prophylaxis: Reason for no pharmacologic prophylaxis hemothorax  VTE Mechanical Prophylaxis: sequential compression device    Portions of the record may have been created with voice recognition software  Occasional wrong word or "sound a like" substitutions may have occurred due to the inherent limitations of voice recognition software    Read the chart carefully and recognize, using context, where substitutions have occurred   ==  Oniel Parker, [address] [facility]  Internal Medicine Residency PGY-1

## 2021-06-02 NOTE — ANESTHESIA POSTPROCEDURE EVALUATION
Post-Op Assessment Note    CV Status:  Stable  Pain Score: 0    Pain management: adequate     Mental Status:  Alert and awake   Hydration Status:  Euvolemic   PONV Controlled:  Controlled   Airway Patency:  Patent      Post Op Vitals Reviewed: Yes      Staff: CRNA         No complications documented      BP (!) 82/61 (06/02/21 1659)    Temp (!) 97 3 °F (36 3 °C) (06/02/21 1659)    Pulse (!) 112 (06/02/21 1659)   Resp 16 (06/02/21 1659)    SpO2 100 % (06/02/21 1659)

## 2021-06-02 NOTE — QUICK NOTE
Post- OP Note - Thoracic Surgery   Yuniel Estimable 57 y o  male MRN: [id number]  Unit/Bed#: [facility] 422-01 Encounter: [id number]    Assessment:  Patient is a 57 y o  male s/p R VATS decortication 6/2    R CT x2 (Sxn,-AL)- 214cc SS     Plan:   CV diet   Monitor CT output   OR cultures  I/Os  DVT ppx  Pain/Nausea Control  OOB as tolerated   Incentive Spirometry   Primary per medicine    Subjective/Objective     Subjective:   Patient alert and oriented  Pain well controlled  Per nursesusan positional  No chest pains or shortness of breath  Objective:    Blood pressure 102/56, pulse 68, temperature 98 3 °F (36 8 °C), temperature source Oral, resp  rate 18, weight 130 kg (287 lb 7 7 oz), SpO2 100 %  ,Body mass index is 40 1 kg/m²  Physical Exam:   Gen: NAD  HEENT: MMM  CV: well perfused  Chest Wall: CTs intact, incisions cdi   Lungs: Normal respiratory effort on 2L NC  Abd: soft, nontender, nondistended  Skin: warm/ dry  Neuro:  AxO x3

## 2021-06-02 NOTE — OP NOTE
OPERATIVE REPORT  PATIENT NAME: Charla Dubin    :  1960  MRN: [id number]  Pt Location: BE OR ROOM 08    SURGERY DATE: 2021    Surgeon(s) and Role:     * Erick Cantu MD - Primary     * Xiomy Moser PA-C - Assisting     * Cheyenne Aguirre MD - Assisting    Preop Diagnosis:  Pleural effusion on right [J90]    Post-Op Diagnosis Codes:     * Pleural effusion on right [J90]    Procedure(s) (LRB):  THORACOSCOPY VIDEO ASSISTED SURGERY (VATS) (Right)  DECORTICATION LUNG (Right), total    Specimen(s):  ID Type Source Tests Collected by Time Destination   A : pleural rind - right Tissue Pleural, Right ANAEROBIC CULTURE AND GRAM STAIN, FUNGAL CULTURE, CULTURE, TISSUE AND GRAM STAIN, AFB CULTURE WITH STAIN Erick Cantu MD 2021 1436        Estimated Blood Loss:   Minimal    Drains:  Chest Tube 1 Right Pleural 28 Fr  (Active)   Number of days: 0       Chest Tube 2 Right Pleural 28 Fr  (Active)   Number of days: 0       Urethral Catheter Latex 16 Fr  (Active)   Number of days: 0       [REMOVED] Chest Tube 1 Right 8 Fr  (Removed)   Function -20 cm H2O 21 0800   Chest Tube Air Leak No 21 0800   Patency Intervention Tip/tilt 21 0800   Drainage Description Sanguineous 21 0800   Dressing Status Clean; Intact;Dry 21 0800   Site Assessment Unable to assess 21 0800   Surrounding Skin Unable to view 21 0800   Output (mL) 0 mL 21 0800   Number of days: 5       [REMOVED] Chest Tube Right 12 Fr  (Removed)   Function -20 cm H2O 21 0800   Chest Tube Air Leak No 21 0800   Patency Intervention Tip/tilt 21 0800   Drainage Description Serosanguineous 21 08   Dressing Status Dry; Intact; Clean 21 0800   Site Assessment Unable to assess 21 0800   Surrounding Skin Unable to view 21 0800   Output (mL) 0 mL 21 1200   Number of days: 6       Anesthesia Type:   General    Operative Indications:  Pleural effusion on right [J90]    Operative Findings:  Large amount of retained hemothorax  Densely consolidated lower lobe    Complications:   None    Procedure and Technique:  OPERATION IN DETAIL:   The patient was correctly identified by name and medical record number in the holding area and brought to the operative suite, where he was placed supine on the operative table  After satisfactory induction of general endotracheal anesthesia, a flexible bronchoscope was passed through the endotracheal tube visualizing the distal trachea, elton, right and left main stem bronchi, including all of the primary and secondary divisions  No endobronchial tumor was identified  No suspicion or identified risk for TB or other airborne infectious disease; bronchoscopy procedure being performed for diagnostic purposes  Flexible bronchoscopy was then terminated and the scope was withdrawn  The patient was positioned in the left lateral decubitus position  A 1 5cm skin incision was then made in the 7th intercostal space in the posterior axillary line  Dissection was carried down through the subcutaneous tissue and the thoracic cavity was entered  A port was placed and the thoracoscope was introduced  Upon initial evaluation, there were extensive fibrinous and exudative adhesions along with a large retained hemothorax, approximately 400mL in total after evacuation  The dissection of the lung was started bluntly to release the lung from the chest wall  Once the lung was safely off the chest wall, a working port was placed in the 5th intercostal space in the anterior axillary line and one additional port posteriorly in line with the scapula tip  An intercostal nerve block of Exparel was then performed in rib spaces 3-10  The adhesions were then gently and bluntly dissected away from the wall  The rind on the lung was then fully decorticated with a combination of blunt and bovie dissection   The decortication was confirmed complete with test re-expansion of the lung  After this, the hemithorax was fully irrigated  Two 28F chest tubes (one straight and one angled) were then introduced and advanced to the apex and placed along the diaphragm  The lung was then re-expanded with positive pressure and under direct visualization  The tubes were secured with #0 Prolene  The other incisions were closed with serial vicryl suture and subcuticular #4-0 Monocryl  The wound was dressed with Dermabond  The instrument and sponge count was correct throughout the case  The patient was delivered to PACU without complication       I was present for the entire procedure    Patient Disposition:  PACU  and hemodynamically stable    SIGNATURE: Merrill Gagnon MD  DATE: June 2, 2021  TIME: 4:22 PM

## 2021-06-02 NOTE — ANESTHESIA PROCEDURE NOTES
Arterial Line Insertion  Performed by: Chandu Arthur CRNA  Authorized by: Bi Quezada MD   Consent: Verbal consent not obtained  Written consent obtained  Risks and benefits: risks, benefits and alternatives were discussed  Consent given by: patient  Patient understanding: patient states understanding of the procedure being performed  Patient consent: the patient's understanding of the procedure matches consent given  Procedure consent: procedure consent matches procedure scheduled  Relevant documents: relevant documents present and verified  Required items: required blood products, implants, devices, and special equipment available  Patient identity confirmed: arm band, provided demographic data, hospital-assigned identification number and anonymous protocol, patient vented/unresponsive  Time out: Immediately prior to procedure a "time out" was called to verify the correct patient, procedure, equipment, support staff and site/side marked as required  Preparation: Patient was prepped and draped in the usual sterile fashion    Indications: hemodynamic monitoring  Orientation:  Left  Location: radial artery  Sedation:  Patient sedated: no    Procedure Details:  Xander's test normal: yes  Needle gauge: 20  Seldinger technique: Seldinger technique used  Number of attempts: 1    Post-procedure:  Post-procedure: dressing applied  Waveform: good waveform and waveform confirmed  Post-procedure CNS: normal and unchanged  Patient tolerance: patient tolerated the procedure well with no immediate complications

## 2021-06-02 NOTE — PHYSICAL THERAPY NOTE
Physical Therapy Cancellation Note       06/02/21 1242   Note Type   Note Type Treatment   Cancel Reasons Medical status     Chart reviewed; noted pt is scheduled to OR today for VATS; will need new postop PT orders to resume rehab/mobilization when medically cleared; will follow peripherally until then      Cele Tucker, PT

## 2021-06-02 NOTE — PROGRESS NOTES
NEPHROLOGY PROGRESS NOTE   Rossi Hernandez 57 y o  male MRN: [id number]  Unit/Bed#: [facility] 422-01 Encounter: [id number]  Reason for Consult:  Acute kidney injury    ASSESSMENT/PLAN:  1  Acute kidney injury, likely multifactorial, possible component of ATN given impaired autoregulation in the face of hypotension  2  CKD stage 3 baseline creatinine 1 5-1 8  3  Hyponatremia, secondary to volume overload, improved after Samsca x1, currently on saline, reduce to 50 cc/hour given anticipated surgical intervention  4  Cardiomyopathy, ejection fraction 60% with likely diastolic dysfunction  5  Right pleural effusions status post chest tube placement, anticipating VATS today  6  Chronic hypotension, continue with midodrine 3 times daily     PLAN:  · Overall renal function remains stable  · Sodium level improved after Samsca  · Anticipated surgical intervention today, currently on saline, reduce to 50 cc/hour  · Resume diuretic therapy tomorrow given persistent volume overload    SUBJECTIVE:  Seen examined  Patient anticipating VATS procedure  No reports of chest pain  Mild shortness of breath  Persistent lower extremity swelling  Review of Systems    OBJECTIVE:  Current Weight: Weight - Scale: 130 kg (287 lb 7 7 oz)  Vitals:    06/02/21 0627 06/02/21 0636 06/02/21 1053 06/02/21 1104   BP: 115/71 116/73 117/74 117/74   Pulse:       Resp:  17  18   Temp:  98 2 °F (36 8 °C)  98 3 °F (36 8 °C)   TempSrc:       SpO2:       Weight:           Intake/Output Summary (Last 24 hours) at 6/2/2021 1245  Last data filed at 6/2/2021 1231  Gross per 24 hour   Intake 0 ml   Output 2310 ml   Net -2310 ml       Physical Exam  Constitutional:       Appearance: He is obese  He is not ill-appearing  HENT:      Head: Normocephalic and atraumatic  Eyes:      General: No scleral icterus  Cardiovascular:      Rate and Rhythm: Normal rate and regular rhythm     Pulmonary:      Effort: Pulmonary effort is normal       Breath sounds: Examination of the right-lower field reveals decreased breath sounds  Decreased breath sounds present  Abdominal:      Palpations: Abdomen is soft  Musculoskeletal:      Right lower leg: Edema (2+) present  Left lower leg: Edema ( 2+) present  Skin:     General: Skin is warm and dry  Findings: No rash  Neurological:      Mental Status: He is alert and oriented to person, place, and time           Medications:    Current Facility-Administered Medications:     acetaminophen (TYLENOL) tablet 650 mg, 650 mg, Oral, Q6H PRN, Caitlin Ramirez MD, 650 mg at 05/29/21 1734    calcium carbonate (TUMS) chewable tablet 1,000 mg, 1,000 mg, Oral, Daily PRN, Caitlin Ramirez MD    carvedilol (COREG) tablet 6 25 mg, 6 25 mg, Oral, BID With Meals, Raegan Hall MD, 6 25 mg at 06/02/21 1105    ergocalciferol (VITAMIN D2) capsule 50,000 Units, 50,000 Units, Oral, Weekly, Caitlin Ramirez MD, 50,000 Units at 05/29/21 1036    HYDROmorphone HCl (DILAUDID) injection 0 2 mg, 0 2 mg, Intravenous, Q4H PRN, Mame Webb MD, 0 2 mg at 05/31/21 0654    lidocaine (LMX) 4 % cream, , Topical, Daily PRN, Mayito Bass DO, Given at 06/02/21 0548    menthol-methyl salicylate (BENGAY) 67-55 % cream, , Apply externally, 4x Daily PRN, Yayo Mcdowell DO, Given at 05/30/21 2048    metoprolol (LOPRESSOR) injection 2 5 mg, 2 5 mg, Intravenous, Q6H PRN, David Tim PA-C    midodrine (PROAMATINE) tablet 10 mg, 10 mg, Oral, TID AC, Gi An MD, 10 mg at 06/01/21 1119    nicotine (NICODERM CQ) 14 mg/24hr TD 24 hr patch 1 patch, 1 patch, Transdermal, Daily, Roldan Gates MD    ondansetron (ZOFRAN) injection 4 mg, 4 mg, Intravenous, Q6H PRN, Caitlin Ramirez MD    oxyCODONE (ROXICODONE) IR tablet 2 5 mg, 2 5 mg, Oral, Q4H PRN, Mame Webb MD    oxyCODONE (ROXICODONE) IR tablet 5 mg, 5 mg, Oral, Q4H PRN, Mame Webb MD, 5 mg at 06/01/21 2115    polyethylene glycol (MIRALAX) packet 17 g, 17 g, Oral, Daily PRN, Carine Gerard MD, 17 g at 05/29/21 0209    senna-docusate sodium (SENOKOT S) 8 6-50 mg per tablet 1 tablet, 1 tablet, Oral, HS, Pearl Yeager MD, 1 tablet at 05/31/21 2222    sodium chloride 0 9 % infusion, 100 mL/hr, Intravenous, Continuous, Oniel Parker DO, Last Rate: 100 mL/hr at 06/02/21 1105, 100 mL/hr at 06/02/21 1105    Laboratory Results:  Results from last 7 days   Lab Units 06/02/21  0440 06/01/21  0511 05/31/21  2145 05/31/21  0503 05/30/21  0529 05/29/21  0444 05/28/21  0501  05/27/21  0155   WBC Thousand/uL 5 56 5 28  --  5 78 4 60 5 11 4 71  --  6 38   HEMOGLOBIN g/dL 7 5* 8 0* 8 2* 7 7* 8 0* 7 8* 8 0*   < > 7 1*   HEMATOCRIT % 24 0* 25 5* 26 1* 24 5* 25 6* 25 1* 25 4*   < > 22 7*   PLATELETS Thousands/uL 173 190  --  187 177 158 146*  --  164   POTASSIUM mmol/L 4 0 3 8  --  4 0 4 2 3 9 4 2  --  4 6   CHLORIDE mmol/L 98* 95*  --  98* 99* 98* 98*  --  97*   CO2 mmol/L 30 31  --  30 27 32 30  --  33*   BUN mg/dL 34* 36*  --  40* 47* 52* 65*  --  79*   CREATININE mg/dL 1 34* 1 44*  --  1 47* 1 57* 1 51* 1 70*  --  1 86*   CALCIUM mg/dL 9 3 9 4  --  9 0 9 0 9 0 9 2  --  9 1   MAGNESIUM mg/dL  --  2 4  --   --   --  2 7*  --   --  2 7*    < > = values in this interval not displayed

## 2021-06-02 NOTE — PROGRESS NOTES
Progress Note - Thoracic Surgery   Troy Ko 57 y o  male MRN: [id number]  Unit/Bed#: [facility] 422-01 Encounter: [id number]    Assessment:  58 y/o M p/w R pleural effusion, s/p IR 8Fr CT placement on 5/22 (now removed), new R 12Fr CT placement by IR on 5/27    R CT: 141 cc (-20 sxn, no AL)    Plan:   To OR today for R VATS decortication   Maintain R chest tube to -20 suction in interim   NPO/IVF   Pulmonary toilet   Incentive spirometry    Subjective/Objective     Subjective:   No acute events overnight  Frustrated over having to remain NPO until this afternoon  No other complaints  Objective:    Blood pressure 92/52, pulse 100, temperature 98 6 °F (37 °C), resp  rate 18, weight 130 kg (287 lb 7 7 oz), SpO2 96 %  ,Body mass index is 40 1 kg/m²        Intake/Output Summary (Last 24 hours) at 6/2/2021 0629  Last data filed at 6/2/2021 0519  Gross per 24 hour   Intake 522 ml   Output 1891 ml   Net -1369 ml       Invasive Devices     Peripheral Intravenous Line            Peripheral IV 05/30/21 Left Forearm 2 days          Drain            Chest Tube Right 12 Fr  5 days                Physical Exam:   Gen:  NAD  CV:  warm, well-perfused  Lungs: nl effort on RA   R chest tube to suction, no air leak  Abd:  soft, NT/ND  Ext:  no CCE  Neuro: A&Ox3     Results from last 7 days   Lab Units 06/02/21  0440 06/01/21  0511 05/31/21  2145 05/31/21  0503   WBC Thousand/uL 5 56 5 28  --  5 78   HEMOGLOBIN g/dL 7 5* 8 0* 8 2* 7 7*   HEMATOCRIT % 24 0* 25 5* 26 1* 24 5*   PLATELETS Thousands/uL 173 190  --  187     Results from last 7 days   Lab Units 06/02/21  0440 06/01/21  0511 05/31/21  0503   POTASSIUM mmol/L 4 0 3 8 4 0   CHLORIDE mmol/L 98* 95* 98*   CO2 mmol/L 30 31 30   BUN mg/dL 34* 36* 40*   CREATININE mg/dL 1 34* 1 44* 1 47*   CALCIUM mg/dL 9 3 9 4 9 0     Results from last 7 days   Lab Units 06/02/21  0440 05/27/21  1209   INR  1 18 1 36*   PTT seconds 40*  --

## 2021-06-03 NOTE — PROGRESS NOTES
[address] [place] [address]    Cardiology Progress Note  Niki Sweeney 57 y o  male   YOB: 1960 MRN: [id number]  Unit/Bed#: [facility] 422-01 Encounter: [id number]      Subjective:   No acute shortness of breath or chest pain  Appears comfortable  Chest tube remains in place    Assessments  58-year-old gentleman originally presented to [facility] with recurrent right pleural effusion s/p thoracocentesis (8/25/20, 2/23/21, 5/22/21)  He underwent thoracocentesis, and subsequently had continued bloody fluid fluid drainage, and as aresult was transferred to [facility] for thoracic surgical evaluation  Principal Problem:    Pleural effusion on right  Active Problems:    Atrial fibrillation (HCC)    Chronic diastolic (congestive) heart failure (HCC)    Type 2 diabetes mellitus with diabetic chronic kidney disease (HCC)    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 ascites (HCC)    CKD (chronic kidney disease) stage 3, GFR 30-59 ml/min (HCC)    Chronic anemia    Acute kidney injury superimposed on CKD (HCC)    Hypotension    Right wrist pain        Plan  Persistent Atrial fibrillation  · S/p VATS decortication tomorrow for trapped RLL  · Heart rate was better controlled yesterday in the 80s, but is now back up to the 110s to 120s  · He again did not receive the nighttime dose of carvedilol 6 25 mg b i d    · Carvedilol has been increased to 12 5 mg b i d   · Heart rate elevation is partly compensatory for his anemia/bleeding, and as a result would not be too aggressive with HR control  · Will need to monitor closely due to his borderline blood pressure  · Remains on midodrine 10 tid  · Anticoagulation remains on hold - resume when acceptable from surgical standpoint    Chronic diastolic heart failure  · Home diuretics include Lasix 80 mg b i d  + metolazone 5 mg twice a week  · Diuretics had been hold due to concerns for hypotension and bleeding  · He did get 1 dose IV Bumex 2 mg this morning, but does not seem to have had much response  · Still quite volume overloaded, mostly peripheral edema - some of which is chronic  · Intermittent diuresis as tolerated      Review of Systems   All other systems reviewed and are negative  Telemetry Review: Afib, HR 80-120s    Objective:   Vitals: Blood pressure 93/69, pulse (!) 123, temperature (!) 97 4 °F (36 3 °C), temperature source Oral, resp  rate 18, weight 121 kg (266 lb 5 1 oz), SpO2 99 %  , Body mass index is 37 14 kg/m² ,   Orthostatic Blood Pressures      Most Recent Value   Blood Pressure  93/69 filed at 06/03/2021 1600   Patient Position - Orthostatic VS  Sitting filed at 06/03/2021 2431         Systolic (69WMH), GGX:785 , Min:93 , TVB:192     Diastolic (09JUA), BEP:90, Min:56, Max:86    Wt Readings from Last 5 Encounters:   06/03/21 121 kg (266 lb 5 1 oz)   05/26/21 131 kg (288 lb 12 8 oz)   05/22/21 (!) 136 kg (300 lb 0 7 oz)   04/22/21 (!) 137 kg (302 lb)   04/08/21 127 kg (279 lb 9 6 oz)     I/O       05/30 0701 - 05/31 0700 05/31 0701 - 06/01 0700 06/01 0701 - 06/02 0700    P  O  960 1060 300    Blood  379 2     Total Intake(mL/kg) 960 (7 3) 1439 2 (11 1) 300 (2 3)    Urine (mL/kg/hr) 2375 (0 8) 1825 (0 6)     Chest Tube 310 255 70    Total Output 2685 2080 70    Net -1725 -640 8 +230                     Physical Exam  Vitals signs and nursing note reviewed  Constitutional:       General: He is not in acute distress  Appearance: He is well-developed  He is obese  He is not ill-appearing or diaphoretic  HENT:      Head: Normocephalic and atraumatic  Nose: No congestion  Eyes:      General: No scleral icterus  Conjunctiva/sclera: Conjunctivae normal    Neck:      Musculoskeletal: Neck supple  No muscular tenderness  Vascular: No carotid bruit or JVD  Cardiovascular:      Rate and Rhythm: Normal rate  Rhythm irregular  Heart sounds: Normal heart sounds  No murmur  No friction rub  No gallop      Pulmonary:      Effort: Pulmonary effort is normal  No respiratory distress  Breath sounds: Examination of the left-lower field reveals rales  Rales present  No wheezing  Comments: Chest tube noted in place  Chest:      Chest wall: No tenderness  Abdominal:      General: Abdomen is protuberant  There is no distension  Palpations: Abdomen is soft  Tenderness: There is no abdominal tenderness  Musculoskeletal:         General: Swelling present  No tenderness or deformity  Right lower leg: Edema present  Left lower leg: Edema present  Skin:     General: Skin is warm  Neurological:      General: No focal deficit present  Mental Status: He is alert and oriented to person, place, and time  Mental status is at baseline  Psychiatric:         Mood and Affect: Mood normal          Behavior: Behavior normal          Thought Content:  Thought content normal          Laboratory Results: personally reviewed        CBC with diff:   Results from last 7 days   Lab Units 06/03/21  0443 06/02/21  1960/21  1723 06/02/21  1615 06/02/21  1535 06/02/21  1422 06/02/21  0440 06/01/21  0511  05/31/21  0503 05/30/21  0529  05/28/21  0501   WBC Thousand/uL 7 81 7 26 5 50  --   --   --  5 56 5 28  --  5 78 4 60   < > 4 71   HEMOGLOBIN g/dL 7 2* 7 4* 7 2*  --   --   --  7 5* 8 0*   < > 7 7* 8 0*   < > 8 0*   I STAT HEMOGLOBIN g/dl  --   --   --  7 5* 7 8* 7 8*  --   --   --   --   --   --   --    HEMATOCRIT % 23 6* 23 8* 23 3*  --   --   --  24 0* 25 5*   < > 24 5* 25 6*   < > 25 4*   HEMATOCRIT, ISTAT %  --   --   --  22* 23* 23*  --   --   --   --   --   --   --    MCV fL 98 98 98  --   --   --  96 97  --  96 96   < > 97   PLATELETS Thousands/uL 192 170 168  --   --   --  173 190  --  187 177   < > 146*   MCH pg 29 9 30 3 30 4  --   --   --  29 9 30 4  --  30 2 30 0   < > 30 4   MCHC g/dL 30 5* 31 1* 30 9*  --   --   --  31 3* 31 4  --  31 4 31 3*   < > 31 5   RDW % 15 8* 15 9* 16 0*  --   --   --  15 9* 16 4*  --  16 6* 16 6*   < > 17 2*   MPV fL 10 1 9 9 9 7  --   --   --  10 3 10 4  --  10 2 10 4   < > 10 0   NRBC AUTO /100 WBCs 0  --   --   --   --   --  0 0  --  0  --   --  0    < > = values in this interval not displayed  CMP:  Results from last 7 days   Lab Units 06/03/21 0443 06/1960 06/02/21 1615 06/02/21 1535 06/02/21 1422 06/02/21 0440 06/01/21 0511 05/31/21 0503 05/30/21 0529 05/29/21  0444   POTASSIUM mmol/L 4 6 4 3  --   --   --  4 0 3 8 4 0 4 2 3 9   CHLORIDE mmol/L 102 102  --   --   --  98* 95* 98* 99* 98*   CO2 mmol/L 29 29  --   --   --  30 31 30 27 32   CO2, I-STAT mmol/L  --   --  29 30 31  --   --   --   --   --    BUN mg/dL 33* 32*  --   --   --  34* 36* 40* 47* 52*   CREATININE mg/dL 1 46* 1 24  --   --   --  1 34* 1 44* 1 47* 1 57* 1 51*   GLUCOSE, ISTAT mg/dl  --   --  119 108 107  --   --   --   --   --    CALCIUM mg/dL 8 6 9 0  --   --   --  9 3 9 4 9 0 9 0 9 0   AST U/L 18  --   --   --   --  18 19 19  --   --    ALT U/L 17  --   --   --   --  18 18 18  --   --    ALK PHOS U/L 142*  --   --   --   --  189* 187* 190*  --   --    EGFR ml/min/1 73sq m 51 62  --   --   --  56 52 50 47 49         BMP:  Results from last 7 days   Lab Units 06/03/21 0443 06/1960 06/02/21 1615 06/02/21 1535 06/02/21 1422 06/02/21 0440 06/01/21 0511 05/31/21 0503 05/30/21 0529 05/29/21  0444   POTASSIUM mmol/L 4 6 4 3  --   --   --   --  4 0 3 8 4 0 4 2 3 9   CHLORIDE mmol/L 102 102  --   --   --   --  98* 95* 98* 99* 98*   CO2 mmol/L 29 29  --   --   --   --  30 31 30 27 32   CO2, I-STAT mmol/L  --   --  29 30 31  --   --   --   --   --   --    BUN mg/dL 33* 32*  --   --   --   --  34* 36* 40* 47* 52*   CREATININE mg/dL 1 46* 1 24  --   --   --   --  1 34* 1 44* 1 47* 1 57* 1 51*   GLUCOSE, ISTAT mg/dl  --   --  119 108 107   < >  --   --   --   --   --    CALCIUM mg/dL 8 6 9 0  --   --   --   --  9 3 9 4 9 0 9 0 9 0    < > = values in this interval not displayed         BNP: No results for input(s): BNP in the last 72 hours      Magnesium:   Results from last 7 days   Lab Units 06/02/21  1960/21  0511 05/29/21  0444   MAGNESIUM mg/dL 2 4 2 4 2 7*       Coags:   Results from last 7 days   Lab Units 06/02/21  0440   PTT seconds 40*   INR  1 18       TSH:        Hemoglobin A1C       Lipid Profile:       Meds/Allergies   all current active meds have been reviewed and current meds:   Current Facility-Administered Medications   Medication Dose Route Frequency    acetaminophen (TYLENOL) tablet 650 mg  650 mg Oral Q6H [facility]    bumetanide (BUMEX) injection 2 mg  2 mg Intravenous Once    calcium carbonate (TUMS) chewable tablet 1,000 mg  1,000 mg Oral Daily PRN    carvedilol (COREG) tablet 12 5 mg  12 5 mg Oral BID With Meals    ergocalciferol (VITAMIN D2) capsule 50,000 Units  50,000 Units Oral Weekly    gabapentin (NEURONTIN) capsule 300 mg  300 mg Oral TID    HYDROmorphone HCl (DILAUDID) injection 0 2 mg  0 2 mg Intravenous Q4H PRN    insulin lispro (HumaLOG) 100 units/mL subcutaneous injection 1-6 Units  1-6 Units Subcutaneous TID AC    lidocaine (LMX) 4 % cream   Topical Daily PRN    menthol-methyl salicylate (BENGAY) 14-17 % cream   Apply externally 4x Daily PRN    metoprolol (LOPRESSOR) injection 2 5 mg  2 5 mg Intravenous Q6H PRN    midodrine (PROAMATINE) tablet 10 mg  10 mg Oral TID AC    nicotine (NICODERM CQ) 14 mg/24hr TD 24 hr patch 1 patch  1 patch Transdermal Daily    ondansetron (ZOFRAN) injection 4 mg  4 mg Intravenous Q6H PRN    oxyCODONE (ROXICODONE) IR tablet 2 5 mg  2 5 mg Oral Q4H PRN    oxyCODONE (ROXICODONE) IR tablet 5 mg  5 mg Oral Q4H PRN    polyethylene glycol (MIRALAX) packet 17 g  17 g Oral Daily PRN    senna-docusate sodium (SENOKOT S) 8 6-50 mg per tablet 1 tablet  1 tablet Oral HS     Medications Prior to Admission   Medication    carvedilol (COREG) 6 25 mg tablet    docusate sodium (COLACE) 100 mg capsule    ergocalciferol (ERGOCALCIFEROL) 1 25 MG (60858 UT) capsule    ferrous sulfate 324 (65 Fe) mg    furosemide (LASIX) 80 mg tablet    glucose blood test strip    Lancets (freestyle) lancets    lisinopril (ZESTRIL) 5 mg tablet    metolazone (ZAROXOLYN) 5 mg tablet    omeprazole (PriLOSEC) 20 mg delayed release capsule    polyethylene glycol (MIRALAX) 17 g packet    potassium chloride (K-DUR,KLOR-CON) 20 mEq tablet    warfarin (COUMADIN) 5 mg tablet            Cardiac testing: reviewed  Results for orders placed during the hospital encounter of 21   Echo complete with contrast if indicated    Narrative [address] [facility] [address]  [address], [place] [address]  [phone]    Transthoracic Echocardiogram  2D, M-mode, Doppler, and Color Doppler    Study date:  2021    Patient: Mya Tamez  MR number: [id number]  Account number: [de-identified]  : 1960  Age: 57 years  Gender: Male  Status: Inpatient  Location: Bedside  Height: 71 in  Weight: 350 lb  BP: 130/ 80 mmHg    Indications: shortness of breath    Diagnoses: 428 0 - CONGESTIVE HEART FAILURE    Sonographer:  Uriel 61, CCT  Primary Physician:  Qiana Samuel DO  Referring Physician:  Sylvia Flores DO  Group:  [facility]  Interpreting Physician:  Oleg Davis DO    SUMMARY    LEFT VENTRICLE:  Systolic function was normal  Ejection fraction was estimated to be 60 %  There were no regional wall motion abnormalities  Wall thickness was mildly increased  RIGHT VENTRICLE:  The ventricle was moderately dilated  Systolic function was mildly reduced  LEFT ATRIUM:  The atrium was mildly dilated  RIGHT ATRIUM:  The atrium was moderately dilated  MITRAL VALVE:  There was mild regurgitation  TRICUSPID VALVE:  There was moderate regurgitation  Estimated peak PA pressure was 52 mmHg  HISTORY: PRIOR HISTORY: CHF, morbid obesity    PROCEDURE: The procedure was performed at the bedside  This was a routine study   The transthoracic approach was used  The study included complete 2D imaging, M-mode, complete spectral Doppler, and color Doppler  The heart rate was 80 bpm,  at the start of the study  Echocardiographic views were limited due to poor patient compliance and poor acoustic window availability  This was a technically difficult study  LEFT VENTRICLE: Size was normal  Systolic function was normal  Ejection fraction was estimated to be 60 %  There were no regional wall motion abnormalities  Wall thickness was mildly increased  DOPPLER: The study was not technically  sufficient to allow evaluation of LV diastolic function  RIGHT VENTRICLE: The ventricle was moderately dilated  Systolic function was mildly reduced  LEFT ATRIUM: The atrium was mildly dilated  RIGHT ATRIUM: The atrium was moderately dilated  MITRAL VALVE: Valve structure was normal  There was normal leaflet separation  DOPPLER: The transmitral velocity was within the normal range  There was no evidence for stenosis  There was mild regurgitation  AORTIC VALVE: The valve was trileaflet  Leaflets exhibited normal thickness and normal cuspal separation  DOPPLER: Transaortic velocity was within the normal range  There was no evidence for stenosis  There was no regurgitation  TRICUSPID VALVE: The valve structure was normal  There was normal leaflet separation  DOPPLER: The transtricuspid velocity was within the normal range  There was no evidence for stenosis  There was moderate regurgitation  Estimated peak PA  pressure was 52 mmHg  PULMONIC VALVE: Leaflets exhibited normal thickness, no calcification, and normal cuspal separation  DOPPLER: The transpulmonic velocity was within the normal range  There was no regurgitation  PERICARDIUM: There was no pericardial effusion  The pericardium was normal in appearance  AORTA: The root exhibited normal size  SYSTEMIC VEINS: IVC: The inferior vena cava was not well visualized      SYSTEM MEASUREMENT TABLES    2D  %FS: 31 45 %  Ao Diam: 3 22 cm  EDV(Teich): 148 86 ml  EF(Teich): 58 74 %  ESV(Teich): 61 42 ml  IVSd: 1 51 cm  LA Area: 20 63 cm2  LA Diam: 5 65 cm  LVIDd: 5 52 cm  LVIDs: 3 79 cm  LVPWd: 1 08 cm  RA Area: 33 86 cm2  RWT: 0 39  SV(Teich): 87 44 ml    CW  RAP: 0 mmHg  TR Vmax: 3 47 m/s  TR maxP 33 mmHg    PW  E' Sept: 0 09 m/s  E/E' Sept: 13 35  MV A Ti: 0 48 m/s  MV Dec Kern: 4 5 m/s2  MV DecT: 253 15 ms  MV E Ti: 1 14 m/s  MV E/A Ratio: 2 36  RVSP: 48 33 mmHg    [facility] [place] [facility] Accredited Echocardiography Laboratory    Prepared and electronically signed by    Tanisha Cobian DO  Signed 2021 08:06:00       No results found for this or any previous visit  No results found for this or any previous visit  No results found for this or any previous visit

## 2021-06-03 NOTE — PROGRESS NOTES
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)    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 ascites (HCC)    CKD (chronic kidney disease) stage 3, GFR 30-59 ml/min (HCC)    Chronic anemia    Acute kidney injury superimposed on CKD (HCC)    Hypotension    Right wrist pain    Suspected cirrhosis  -patient has followed with Gastroenterology as an outpatient  -etiology of possible cirrhosis may be secondary to fatty liver disease versus alcohol use  -patient has never had formal biopsy and has just had ultrasound right upper quadrant for diagnosis of cirrhosis as an outpatient  Do recommend further workup to clarify if patient does have cirrhosis or not  -EGD 10/2020 with no varices noted  -elastography-F3 severe fibrosis  -patient does have significant lower extremity edema; recommend elevating lower extremities     Plan  -will discuss with attending, but likely will need transjugular biopsy in portal pressures with IR that can be done as an outpatient    -2 g sodium diet  -follow up with GI as outpatient for biopsy  -GI will sign off; please reach out if there are any further questions     Right pleural effusion  -VATS procedure   -2 right-sided chest tubes in place set to suction  -thoracic surgery following      Subjective:   Patient resting comfortably on examination  Patient did go for VATS procedure yesterday  Patient's only complaint is pain where right-sided chest tubes are present, but states this is manageable    No other complaints on examination       Vitals: Temp (24hrs), Av 3 °F (36 8 °C), Min:97 3 °F (36 3 °C), Max:98 8 °F (37 1 °C)  Current: Temperature: 98 6 °F (37 °C)  Vitals:    06/03/21 0200 06/03/21 0244 06/03/21 0400 06/03/21 0600   BP: 113/62 110/57 122/63 109/63   BP Location:  Left arm     Pulse: 98 (!) 108 (!) 110 (!) 114   Resp: 18 18 16 18   Temp:  98 6 °F (37 °C)     TempSrc:  Oral     SpO2:  99%     Weight:    121 kg (266 lb 5 1 oz)    Body mass index is 37 14 kg/m²  I/O last 24 hours: In: 2991 7 [I V :2691 7; IV [id number]:541]  Out: 2514 [Urine:1800; Chest Tube:714]      Physical Exam  Vitals signs and nursing note reviewed  Constitutional:       General: He is not in acute distress  Appearance: He is well-developed  HENT:      Head: Normocephalic and atraumatic  Eyes:      General: No scleral icterus  Conjunctiva/sclera: Conjunctivae normal    Cardiovascular:      Rate and Rhythm: Normal rate and regular rhythm  Heart sounds: Normal heart sounds  No murmur  No friction rub  No gallop  Pulmonary:      Effort: Pulmonary effort is normal  No respiratory distress  Breath sounds: Normal breath sounds  No wheezing or rales  Comments: Right-sided chest tube in place  Decreased breath sounds on right side; improved compared to prior exam  Abdominal:      General: Bowel sounds are normal  There is no distension  Palpations: Abdomen is soft  Tenderness: There is no abdominal tenderness  Musculoskeletal: Normal range of motion  General: Swelling present  Comments: Chronic venous stasis changes lower extremities bilaterally  4+ lower extremity edema bilaterally   Skin:     General: Skin is warm  Findings: No rash  Neurological:      Mental Status: He is alert and oriented to person, place, and time              Invasive Devices     Peripheral Intravenous Line            Peripheral IV 06/02/21 Right Arm less than 1 day    Peripheral IV 06/02/21 Right;Upper;Ventral (anterior) Arm less than 1 day          Arterial Line            Arterial Line 06/02/21 Left Radial less than 1 day          Drain Chest Tube 1 Right Pleural 28 Fr  less than 1 day    Chest Tube 2 Right Pleural 28 Fr  less than 1 day    Urethral Catheter Latex 16 Fr  less than 1 day                          Labs:   Recent Results (from the past 24 hour(s))   POCT Blood Gas (CG8+)    Collection Time: 06/02/21  2:22 PM   Result Value Ref Range    pH, Art i-STAT 7 465 (H) 7 350 - 7 450    pCO2, Art i-STAT 42 0 36 0 - 44 0 mm HG    pO2, ART i-STAT 243 0 (H) 75 0 - 129 0 mm HG    BE, i-STAT 6 (H) -2 - 3 mmol/L    HCO3, Art i-STAT 30 2 (H) 22 0 - 28 0 mmol/L    CO2, i-STAT 31 21 - 32 mmol/L    O2 Sat, i-STAT 100 (H) 60 - 85 %    SODIUM, I-STAT 136 136 - 145 mmol/l    Potassium, i-STAT 3 8 3 5 - 5 3 mmol/L    Calcium, Ionized i-STAT 1 16 1 12 - 1 32 mmol/L    Hct, i-STAT 23 (L) 36 5 - 49 3 %    Hgb, i-STAT 7 8 (L) 12 0 - 17 0 g/dl    Glucose, i-STAT 107 65 - 140 mg/dl    Specimen Type ARTERIAL    Culture, tissue and Gram stain    Collection Time: 06/02/21  2:36 PM    Specimen: Pleural, Right; Tissue   Result Value Ref Range    Gram Stain Result No Polys or Bacteria seen    POCT Blood Gas (CG8+)    Collection Time: 06/02/21  3:35 PM   Result Value Ref Range    pH, Art i-STAT 7 466 (H) 7 350 - 7 450    pCO2, Art i-STAT 40 1 36 0 - 44 0 mm HG    pO2, ART i-STAT 118 0 75 0 - 129 0 mm HG    BE, i-STAT 5 (H) -2 - 3 mmol/L    HCO3, Art i-STAT 28 9 (H) 22 0 - 28 0 mmol/L    CO2, i-STAT 30 21 - 32 mmol/L    O2 Sat, i-STAT 99 (H) 60 - 85 %    SODIUM, I-STAT 136 136 - 145 mmol/l    Potassium, i-STAT 3 8 3 5 - 5 3 mmol/L    Calcium, Ionized i-STAT 1 18 1 12 - 1 32 mmol/L    Hct, i-STAT 23 (L) 36 5 - 49 3 %    Hgb, i-STAT 7 8 (L) 12 0 - 17 0 g/dl    Glucose, i-STAT 108 65 - 140 mg/dl    Specimen Type ARTERIAL    POCT Blood Gas (CG8+)    Collection Time: 06/02/21  4:15 PM   Result Value Ref Range    pH, Art i-STAT 7 465 (H) 7 350 - 7 450    pCO2, Art i-STAT 39 1 36 0 - 44 0 mm HG    pO2, ART i-STAT 126 0 75 0 - 129 0 mm HG    BE, i-STAT 4 (H) -2 - 3 mmol/L    HCO3, Art i-STAT 28 1 (H) 22 0 - 28 0 mmol/L    CO2, i-STAT 29 21 - 32 mmol/L    O2 Sat, i-STAT 99 (H) 60 - 85 %    SODIUM, I-STAT 136 136 - 145 mmol/l    Potassium, i-STAT 3 9 3 5 - 5 3 mmol/L    Calcium, Ionized i-STAT 1 18 1 12 - 1 32 mmol/L    Hct, i-STAT 22 (L) 36 5 - 49 3 %    Hgb, i-STAT 7 5 (L) 12 0 - 17 0 g/dl    Glucose, i-STAT 119 65 - 140 mg/dl    Specimen Type ARTERIAL    Fingerstick Glucose (POCT)    Collection Time: 06/02/21  5:05 PM   Result Value Ref Range    POC Glucose 127 65 - 140 mg/dl   CBC and Platelet    Collection Time: 06/02/21  5:23 PM   Result Value Ref Range    WBC 5 50 4 31 - 10 16 Thousand/uL    RBC 2 37 (L) 3 88 - 5 62 Million/uL    Hemoglobin 7 2 (L) 12 0 - 17 0 g/dL    Hematocrit 23 3 (L) 36 5 - 49 3 %    MCV 98 82 - 98 fL    MCH 30 4 26 8 - 34 3 pg    MCHC 30 9 (L) 31 4 - 37 4 g/dL    RDW 16 0 (H) 11 6 - 15 1 %    Platelets 794 695 - 689 Thousands/uL    MPV 9 7 8 9 - 12 7 fL   CBC    Collection Time: 06/02/21  7:59 PM   Result Value Ref Range    WBC 7 26 4 31 - 10 16 Thousand/uL    RBC 2 44 (L) 3 88 - 5 62 Million/uL    Hemoglobin 7 4 (L) 12 0 - 17 0 g/dL    Hematocrit 23 8 (L) 36 5 - 49 3 %    MCV 98 82 - 98 fL    MCH 30 3 26 8 - 34 3 pg    MCHC 31 1 (L) 31 4 - 37 4 g/dL    RDW 15 9 (H) 11 6 - 15 1 %    Platelets 438 981 - 696 Thousands/uL    MPV 9 9 8 9 - 12 7 fL   Basic metabolic panel    Collection Time: 06/02/21  7:59 PM   Result Value Ref Range    Sodium 135 (L) 136 - 145 mmol/L    Potassium 4 3 3 5 - 5 3 mmol/L    Chloride 102 100 - 108 mmol/L    CO2 29 21 - 32 mmol/L    ANION GAP 4 4 - 13 mmol/L    BUN 32 (H) 5 - 25 mg/dL    Creatinine 1 24 0 60 - 1 30 mg/dL    Glucose 151 (H) 65 - 140 mg/dL    Calcium 9 0 8 3 - 10 1 mg/dL    eGFR 62 ml/min/1 73sq m   Magnesium    Collection Time: 06/02/21  7:59 PM   Result Value Ref Range    Magnesium 2 4 1 6 - 2 6 mg/dL   Fingerstick Glucose (POCT)    Collection Time: 06/02/21  8:45 PM   Result Value Ref Range    POC Glucose 169 (H) 65 - 140 mg/dl   CBC and differential    Collection Time: 06/03/21  4:43 AM   Result Value Ref Range    WBC 7 81 4 31 - 10 16 Thousand/uL    RBC 2 41 (L) 3 88 - 5 62 Million/uL    Hemoglobin 7 2 (L) 12 0 - 17 0 g/dL    Hematocrit 23 6 (L) 36 5 - 49 3 %    MCV 98 82 - 98 fL    MCH 29 9 26 8 - 34 3 pg    MCHC 30 5 (L) 31 4 - 37 4 g/dL    RDW 15 8 (H) 11 6 - 15 1 %    MPV 10 1 8 9 - 12 7 fL    Platelets 728 484 - 095 Thousands/uL    nRBC 0 /100 WBCs    Neutrophils Relative 90 (H) 43 - 75 %    Immat GRANS % 1 0 - 2 %    Lymphocytes Relative 4 (L) 14 - 44 %    Monocytes Relative 5 4 - 12 %    Eosinophils Relative 0 0 - 6 %    Basophils Relative 0 0 - 1 %    Neutrophils Absolute 7 05 1 85 - 7 62 Thousands/µL    Immature Grans Absolute 0 05 0 00 - 0 20 Thousand/uL    Lymphocytes Absolute 0 28 (L) 0 60 - 4 47 Thousands/µL    Monocytes Absolute 0 42 0 17 - 1 22 Thousand/µL    Eosinophils Absolute 0 00 0 00 - 0 61 Thousand/µL    Basophils Absolute 0 01 0 00 - 0 10 Thousands/µL   Comprehensive metabolic panel    Collection Time: 06/03/21  4:43 AM   Result Value Ref Range    Sodium 136 136 - 145 mmol/L    Potassium 4 6 3 5 - 5 3 mmol/L    Chloride 102 100 - 108 mmol/L    CO2 29 21 - 32 mmol/L    ANION GAP 5 4 - 13 mmol/L    BUN 33 (H) 5 - 25 mg/dL    Creatinine 1 46 (H) 0 60 - 1 30 mg/dL    Glucose 181 (H) 65 - 140 mg/dL    Calcium 8 6 8 3 - 10 1 mg/dL    Corrected Calcium 9 7 8 3 - 10 1 mg/dL    AST 18 5 - 45 U/L    ALT 17 12 - 78 U/L    Alkaline Phosphatase 142 (H) 46 - 116 U/L    Total Protein 6 9 6 4 - 8 2 g/dL    Albumin 2 6 (L) 3 5 - 5 0 g/dL    Total Bilirubin 0 46 0 20 - 1 00 mg/dL    eGFR 51 ml/min/1 73sq m   Fingerstick Glucose (POCT)    Collection Time: 06/03/21  6:00 AM   Result Value Ref Range    POC Glucose 316 (H) 65 - 140 mg/dl   Prepare Leukoreduced RBC: 2 Units    Collection Time: 06/03/21  7:04 AM   Result Value Ref Range    Unit Product Code E6308K13     Unit Number [id number]     Unit ABO O     Unit RH POS Crossmatch Compatible     Unit Dispense Status Return to [facility]     Unit Product Code W7862J04     Unit Number [id number]     Unit ABO O     Unit RH POS     Crossmatch Compatible     Unit Dispense Status Return to [facility]        Radiology Results: I have personally reviewed pertinent reports  Other Diagnostic Testing:   I have personally reviewed pertinent reports          Active Meds:   Current Facility-Administered Medications   Medication Dose Route Frequency    acetaminophen (TYLENOL) tablet 650 mg  650 mg Oral Q6H [address]    calcium carbonate (TUMS) chewable tablet 1,000 mg  1,000 mg Oral Daily PRN    carvedilol (COREG) tablet 6 25 mg  6 25 mg Oral BID With Meals    ceFAZolin (ANCEF) IVPB (premix in dextrose) 2,000 mg 50 mL  2,000 mg Intravenous Q8H    ergocalciferol (VITAMIN D2) capsule 50,000 Units  50,000 Units Oral Weekly    gabapentin (NEURONTIN) capsule 300 mg  300 mg Oral TID    HYDROmorphone HCl (DILAUDID) injection 0 2 mg  0 2 mg Intravenous Q4H PRN    insulin lispro (HumaLOG) 100 units/mL subcutaneous injection 1-6 Units  1-6 Units Subcutaneous TID AC    lidocaine (LMX) 4 % cream   Topical Daily PRN    menthol-methyl salicylate (BENGAY) 80-56 % cream   Apply externally 4x Daily PRN    metoprolol (LOPRESSOR) injection 2 5 mg  2 5 mg Intravenous Q6H PRN    midodrine (PROAMATINE) tablet 10 mg  10 mg Oral TID AC    nicotine (NICODERM CQ) 14 mg/24hr TD 24 hr patch 1 patch  1 patch Transdermal Daily    ondansetron (ZOFRAN) injection 4 mg  4 mg Intravenous Q6H PRN    oxyCODONE (ROXICODONE) IR tablet 2 5 mg  2 5 mg Oral Q4H PRN    oxyCODONE (ROXICODONE) IR tablet 5 mg  5 mg Oral Q4H PRN    polyethylene glycol (MIRALAX) packet 17 g  17 g Oral Daily PRN    senna-docusate sodium (SENOKOT S) 8 6-50 mg per tablet 1 tablet  1 tablet Oral HS         VTE Pharmacologic Prophylaxis: Reason for no pharmacologic prophylaxis chest tube and possible hemothorax  VTE Mechanical Prophylaxis: sequential compression device    Josie Simmons DO

## 2021-06-03 NOTE — PROGRESS NOTES
Patient complained about being hungry after returning from surgery at 2000  Nurse offered food but the patient refused  Patient complained about being starved at 2300  Nurse again offered food but patient stated he could not feed himself  Nurse offered to feed patient   Patient stated "I am not a god damn [place] clark, I will not be fed like a dog "

## 2021-06-03 NOTE — PHYSICAL THERAPY NOTE
Physical Therapy Screen    Patient Name: Jose Ortiz    YHKEG'X Date: 6/3/2021     Problem List  Principal Problem:    Pleural effusion on right  Active Problems:    Atrial fibrillation (HCC)    Chronic diastolic (congestive) heart failure (HCC)    Type 2 diabetes mellitus with diabetic chronic kidney disease (HCC)    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 ascites (HCC)    CKD (chronic kidney disease) stage 3, GFR 30-59 ml/min (HCC)    Chronic anemia    Acute kidney injury superimposed on CKD (HCC)    Hypotension    Right wrist pain       Past Medical History  Past Medical History:   Diagnosis Date    A-fib [facility])     Cardiac disease     Chronic combined systolic (congestive) and diastolic (congestive) heart failure (HCC)     Diabetes mellitus ([facility] )     Dilated cardiomyopathy ([facility] )     History of echocardiogram 11/24/2014    EF 0 30 (30%), Likely mod LV systolic dysfunction  Likely RV dysfunction as well   History of Lexiscan MPI 02/19/2016    EF 0 43 (43%), no prior MI or ischemia   Hx of echocardiogram 04/28/2017    Normal EF, Normal LV systolic function  Mild concentric LV hypertrophy  Mild mitral and tricuspid regurg   Hx: recurrent pneumonia     Hypertension     Long term (current) use of anticoagulants     Morbid (severe) obesity due to excess calories (HCC)     Neuropathy         Past Surgical History  Past Surgical History:   Procedure Laterality Date    HERNIA REPAIR      IR CHEST TUBE CHECK/CHANGE/REPOSITION/UPSIZE  5/27/2021    IR CHEST TUBE PLACEMENT  5/22/2021    IR THORACENTESIS  8/25/2020    IR THORACENTESIS  2/23/2021    SPLENECTOMY, TOTAL      VASCULAR SURGERY Right     leg        Chart reviewed; noted pt underwent (R) VATS on 6/2/2021; will need new postop PT orders to re-assess the pt when medically cleared; will otherwise sign off      Justus Camacho PT

## 2021-06-03 NOTE — PROGRESS NOTES
NEPHROLOGY PROGRESS NOTE   Dolores Link 57 y o  male MRN: [id number]  Unit/Bed#: [facility] 422-01 Encounter: [id number]  Reason for Consult:  Acute kidney injury    ASSESSMENT/PLAN:  1  Acute kidney injury, likely multifactorial, possible component of ATN given impaired autoregulation in the face of hypotension  2  CKD stage 3 baseline creatinine 1 5-1 8  3  Hyponatremia, secondary to volume overload, improved after Samsca x1, Bumex resumed  4  Cardiomyopathy, ejection fraction 60% with likely diastolic dysfunction  5  Right pleural effusions status post chest tube placement, post op VATS and right lung decortication  6  Chronic hypotension, continue with midodrine 3 times daily      PLAN:  · Overall renal function appears fairly stable  · Hyponatremia has resolved  · Agree with resuming of diuretic therapy  · No other meds current regimen    SUBJECTIVE:  Seen examined  Patient doing fairly well  Does complain of right sided chest pain due to his chest tube  No reports of abdominal pain  Decrease in lower extremity swelling    Review of Systems    OBJECTIVE:  Current Weight: Weight - Scale: 121 kg (266 lb 5 1 oz)  Vitals:    06/03/21 0600 06/03/21 0700 06/03/21 1214 06/03/21 1335   BP: 109/63 119/86 94/63 93/69   BP Location:  Left arm Left arm    Pulse: (!) 114 (!) 134 (!) 116 (!) 115   Resp: 18 18 18    Temp:  98 1 °F (36 7 °C) 98 1 °F (36 7 °C)    TempSrc:  Oral Oral    SpO2:  99%     Weight: 121 kg (266 lb 5 1 oz)          Intake/Output Summary (Last 24 hours) at 6/3/2021 1433  Last data filed at 6/3/2021 1214  Gross per 24 hour   Intake 3221 67 ml   Output 3295 ml   Net -73 33 ml       Physical Exam  Constitutional:       Appearance: He is obese  HENT:      Head: Normocephalic and atraumatic  Eyes:      General: No scleral icterus  Cardiovascular:      Rate and Rhythm: Normal rate and regular rhythm     Pulmonary:      Effort: Pulmonary effort is normal       Breath sounds: Examination of the right-lower field reveals decreased breath sounds  Decreased breath sounds present  Abdominal:      Palpations: Abdomen is soft  Musculoskeletal:      Right lower leg: Edema present  Left lower leg: Edema present  Skin:     General: Skin is warm and dry  Findings: Rash (Stasis dermatitis) present  Neurological:      Mental Status: He is alert and oriented to person, place, and time           Medications:    Current Facility-Administered Medications:     acetaminophen (TYLENOL) tablet 650 mg, 650 mg, Oral, Q6H [facility], Naty Davenport PA-C, 650 mg at 06/03/21 1141    bumetanide (BUMEX) injection 2 mg, 2 mg, Intravenous, Once, Getachew Toribio,     calcium carbonate (TUMS) chewable tablet 1,000 mg, 1,000 mg, Oral, Daily PRN, Naty Davenport PA-C    carvedilol (COREG) tablet 12 5 mg, 12 5 mg, Oral, BID With Meals, Sameer Fears, DO    ergocalciferol (VITAMIN D2) capsule 50,000 Units, 50,000 Units, Oral, Weekly, Basilio Huang PA-C, 50,000 Units at 05/29/21 1036    gabapentin (NEURONTIN) capsule 300 mg, 300 mg, Oral, TID, Naty Davenport PA-C, 300 mg at 06/03/21 1018    HYDROmorphone HCl (DILAUDID) injection 0 2 mg, 0 2 mg, Intravenous, Q4H PRN, Casandra Davenport PA-C, 0 2 mg at 05/31/21 0654    insulin lispro (HumaLOG) 100 units/mL subcutaneous injection 1-6 Units, 1-6 Units, Subcutaneous, TID AC, 1 Units at 06/03/21 1039 **AND** Fingerstick Glucose (POCT), , , TID AC, Getachew Toribio, DO    lidocaine (LMX) 4 % cream, , Topical, Daily PRN, Basilio Huang PA-C, Given at 06/02/21 0548    menthol-methyl salicylate (BENGAY) 90-08 % cream, , Apply externally, 4x Daily PRN, Basilio Huang PA-C, Given at 05/30/21 2048    metoprolol (LOPRESSOR) injection 2 5 mg, 2 5 mg, Intravenous, Q6H PRN, Basilio Huang PA-C, 2 5 mg at 06/03/21 0612    midodrine (PROAMATINE) tablet 10 mg, 10 mg, Oral, TID AC, Naty Davenport PA-C, 10 mg at 06/03/21 1050    nicotine (NICODERM CQ) 14 mg/24hr TD 24 hr patch 1 patch, 1 patch, Transdermal, Daily, Naty Davenport PA-C    ondansetron [facility]) injection 4 mg, 4 mg, Intravenous, Q6H PRN, Naty Davenport PA-C    oxyCODONE (ROXICODONE) IR tablet 2 5 mg, 2 5 mg, Oral, Q4H PRN, SANDRA Villavicencio-PADMA    oxyCODONE (ROXICODONE) IR tablet 5 mg, 5 mg, Oral, Q4H PRN, Lyndsey Yarbrough PA-C, 5 mg at 06/03/21 1038    polyethylene glycol (MIRALAX) packet 17 g, 17 g, Oral, Daily PRN, Lyndsey Yarbrough PA-C, 17 g at 05/29/21 0209    senna-docusate sodium (SENOKOT S) 8 6-50 mg per tablet 1 tablet, 1 tablet, Oral, HS, Lyndsey Yarbrough PA-C, 1 tablet at 05/31/21 2222    Laboratory Results:  Results from last 7 days   Lab Units 06/03/21  0443 06/02/21  1959/21  1723 06/02/21  1615 06/02/21  1535 06/02/21  1422 06/02/21  0440 06/01/21  0511  05/31/21  0503 05/30/21  0529 05/29/21  0444   WBC Thousand/uL 7 81 7 26 5 50  --   --   --  5 56 5 28  --  5 78 4 60 5 11   HEMOGLOBIN g/dL 7 2* 7 4* 7 2*  --   --   --  7 5* 8 0*   < > 7 7* 8 0* 7 8*   I STAT HEMOGLOBIN g/dl  --   --   --  7 5* 7 8* 7 8*  --   --   --   --   --   --    HEMATOCRIT % 23 6* 23 8* 23 3*  --   --   --  24 0* 25 5*   < > 24 5* 25 6* 25 1*   HEMATOCRIT, ISTAT %  --   --   --  22* 23* 23*  --   --   --   --   --   --    PLATELETS Thousands/uL 192 170 168  --   --   --  173 190  --  187 177 158   POTASSIUM mmol/L 4 6 4 3  --   --   --   --  4 0 3 8  --  4 0 4 2 3 9   CHLORIDE mmol/L 102 102  --   --   --   --  98* 95*  --  98* 99* 98*   CO2 mmol/L 29 29  --   --   --   --  30 31  --  30 27 32   CO2, I-STAT mmol/L  --   --   --  29 30 31  --   --   --   --   --   --    BUN mg/dL 33* 32*  --   --   --   --  34* 36*  --  40* 47* 52*   CREATININE mg/dL 1 46* 1 24  --   --   --   --  1 34* 1 44*  --  1 47* 1 57* 1 51*   CALCIUM mg/dL 8 6 9 0  --   --   --   --  9 3 9 4  --  9 0 9 0 9 0   MAGNESIUM mg/dL  --  2 4  --   --   --   --   --  2 4  --   --   --  2 7*   GLUCOSE, ISTAT mg/dl  --   --   --  119 108 107  --   --   --   --   -- --     < > = values in this interval not displayed

## 2021-06-03 NOTE — PROGRESS NOTES
Progress Note - Thoracic Surgery   Carry Verna 57 y o  male MRN: [id number]  Unit/Bed#: [facility] 422-01 Encounter: [id number]    Assessment:  62yo M with R pleural effusion, s/p R chest tube placement x2 with incomplete drainage, now s/p R VATS decortication and chest tube placement x2 (6/2/21)    R CT x2: 700 cc serosanguineous (-20, small air leak with cough)    AVSS on 2L NC  UOP 1800    Hgb 7 2 (7 2)  WBC 7 8 (5 5)  Crt 1 46 (1 24/1 34)    Plan:   Maintain R chest tubes to -20 suction   Discontinue arterial line   Discontinue leahy   Discontinue IVF    Wean supplemental oxygen as able   Diet as tolerated   Pulmonary toilet   Incentive spirometry   OOB/ambulate in hallway   Pain control: consider addition of adjuncts today given persistent discomfort   Downgrade to med/surg once susan removed    Subjective/Objective     Subjective:   No acute events overnight  Reports some discomfort due to chest tubes, but states pain medications are helping somewhat  Denies SOB  Objective:    Blood pressure 122/63, pulse (!) 110, temperature 98 6 °F (37 °C), temperature source Oral, resp  rate 16, weight 130 kg (287 lb 7 7 oz), SpO2 99 %  ,Body mass index is 40 1 kg/m²        Intake/Output Summary (Last 24 hours) at 6/3/2021 0636  Last data filed at 6/3/2021 0400  Gross per 24 hour   Intake 2991 67 ml   Output 2514 ml   Net 477 67 ml       Invasive Devices     Peripheral Intravenous Line            Peripheral IV 06/02/21 Right Arm less than 1 day    Peripheral IV 06/02/21 Right;Upper;Ventral (anterior) Arm less than 1 day          Arterial Line            Arterial Line 06/02/21 Left Radial less than 1 day          Drain            Chest Tube 1 Right Pleural 28 Fr  less than 1 day    Chest Tube 2 Right Pleural 28 Fr  less than 1 day    Urethral Catheter Latex 16 Fr  less than 1 day                Physical Exam:   Gen:  NAD, sitting up in chair  CV:  warm, well-perfused  Lungs: nl effort on 2L NC, incisions C/D/I    R CT in place x2, small air leak with cough  Abd:  soft, NT/ND  : Gann in place  Ext:  no CCE  Neuro: A&Ox3     Results from last 7 days   Lab Units 06/03/21 0443 06/1959 06/02/21  1723   WBC Thousand/uL 7 81 7 26 5 50   HEMOGLOBIN g/dL 7 2* 7 4* 7 2*   HEMATOCRIT % 23 6* 23 8* 23 3*   PLATELETS Thousands/uL 192 170 168     Results from last 7 days   Lab Units 06/03/21 0443 06/1959 06/02/21  1615  06/02/21  0440   POTASSIUM mmol/L 4 6 4 3  --   --  4 0   CHLORIDE mmol/L 102 102  --   --  98*   CO2 mmol/L 29 29  --   --  30   CO2, I-STAT mmol/L  --   --  29   < >  --    BUN mg/dL 33* 32*  --   --  34*   CREATININE mg/dL 1 46* 1 24  --   --  1 34*   GLUCOSE, ISTAT mg/dl  --   --  119   < >  --    CALCIUM mg/dL 8 6 9 0  --   --  9 3    < > = values in this interval not displayed       Results from last 7 days   Lab Units 06/02/21 0440 05/27/21  1209   INR  1 18 1 36*   PTT seconds 40*  --

## 2021-06-03 NOTE — PROGRESS NOTES
INTERNAL MEDICINE RESIDENCY PROGRESS NOTE     Name: Memo Olivares   Age & Sex: 57 y o  male   MRN: [id number]  Unit/Bed#: [address] 422-01   Encounter: [id number]  Team: SOD Team C     PATIENT INFORMATION     Name: Memo Olivares   Age & Sex: 57 y o  male   MRN: [id number]  Hospital Stay Days: 8    ASSESSMENT/PLAN     Principal Problem:    Pleural effusion on right  Active Problems:    Acute kidney injury superimposed on CKD [facility])    Atrial fibrillation (HCC)    Chronic diastolic (congestive) heart failure (HCC)    Type 2 diabetes mellitus with diabetic chronic kidney disease (HCC)    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 ascites (HCC)    CKD (chronic kidney disease) stage 3, GFR 30-59 ml/min (HCC)    Chronic anemia    Hypotension    Right wrist pain      * Pleural effusion on right  Assessment & Plan  Patient transferred to [place] from [facility] for thoracic surgery evaluation  History of recurrent right-sided pleural effusion s/p thoracentesis on 05/22, chest tube placed with continued output of bloody fluid  Fluid-exudative but negative for malignancy, cultures negative      Plan:  · Repeat chest x-ray on 05/24 with continued effusion  · CT scan performed on 05/26 revealed pleural effusion improvement with new hyperdensities consistent with lung hematomas  · Consulted thoracic surgery for evaluation  · Management per thoracic surgery  · S/p tpa/DNAse on 5 29 with good output  · CT of the chest on 05/30 revealed a small loculated right hydropneumothorax and benign rounded atelectasis  · Patient is now status post VATS with decortication on the right on 06/02, follow thoracic surgery recommendations  · Cont monitoring the output  · IS  · Pain control  · Bowel regimen in place    Acute kidney injury superimposed on CKD [facility])  Assessment & Plan  Likely secondary to episodes of hypotension with anemia vs cardiorenal     Plan:  · Nephrology following, appreciate reocmmendations  · Baseline Cr 1 5-1 8, admitted with Cr of 2 8 on 5/21/2021  · Cr improved to baseline, slightly worsened overnight creatinine now 1 46  · Patient started on midodrine  · Continue holding home med Lisinopril 5mg for now  · Ultimately will require outpatient follow up with pt's nephrologist Dr Umer Park    Atrial fibrillation [facility])  Assessment & Plan  HR elevated due to resp status  History of Afib  Plan:  · On Coreg 6 25mg BID, increase to 12 5 twice daily, required 1 time dose of metoprolol 2 5 mg in the morning of 6/3  · Chronic anticoagulation with Coumadin 5 milligrams Sunday, Tuesday, Wednesday, Friday, Saturday and 2 5 milligrams Monday and Thursday  · Coumadin currently on hold given anemia and bloody output from chest tube  · Cardiology following, considering digoxin if heart rate stays above 120  · [id number]    Right wrist pain  Assessment & Plan  Patient with pain in his wrist after waking up on 05/31  Has been 10/10 at times  X-ray negative for osseous abnormalities  Wrist splint with minimal relief  Patient unable to receive NSAIDs in the setting of renal disease  Plan:  · Trial 1 time dose of prednisone 40 mg  · Continue monitor, patient has been improving    Hypotension  Assessment & Plan  In the setting of output from chest tube plus Afib/CHF medications (Coreg 6 25 BID, Lasix 80mg BID)    Plan:  · Midodrine increased to 10mg TID added by nephrology    Chronic anemia  Assessment & Plan  With baseline hemoglobin approximately 9 5  Now acute on chronic d/t blood loss  Plan:  · Patient received 1 unit of PRBC on 05/25/2021 due to bloody chest tube drainage and hematoma  · Stool occult neg  · Given another 1 U PRBC on 5/27/2021  · Additional unit of packed red blood cells given on 05/31  · Hemoglobin down to 7 2 on 06/03  · Continue to monitor H&H    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 ascites [facility])  Assessment & Plan  Last seen by outpatient GI on 04/08  [facility]  [facility]ening is up-to-date   Last EGD did not show any esophageal varices  Plan:  · Suspect fatty liver vs cardiac in nature  · MELD of 18, Child Malloy class B  · GI now on board  · Transjugular liver biopsy along with portal pressure measurements by IR delayed until after thoracic surgery procedure  · Possible elastography for further evaluation    Type 2 diabetes mellitus with diabetic chronic kidney disease [facility])  Assessment & Plan  Lab Results   Component Value Date    HGBA1C 5 5 05/21/2021     Plan:   · Controlled hemoglobin A1c  Not on any home meds  · Patient with a point of care blood glucose 316 today, initiated sliding scale insulin algorithm 3    Chronic diastolic (congestive) heart failure (HCC)  Assessment & Plan  Wt Readings from Last 3 Encounters:   06/02/21 130 kg (287 lb 7 7 oz)   05/26/21 131 kg (288 lb 12 8 oz)   05/22/21 (!) 136 kg (300 lb 0 7 oz)     Patient appears slightly hypervolemic with LE edema  Followed by Dr Mary Ellen Oliveira OP  Home regimen: lasix 80 mg BID, metolazone 5 mg twice a week  Plan:  · Lasix 80 milligrams b i d -patient has been unable to receive this given low blood pressure  · Will need diuresis postop, was going to give 2 mg Bumex IV today, the patient's blood pressure has been unable to tolerate  · Weight has remained stable   · Continue intake and output and daily weights  · Cardiology following, appreciate recommendations        Disposition:  Continue inpatient care  Patient status post right VATS with decortication  SUBJECTIVE     Patient seen and examined  No acute events overnight  Patient is status post VATS with decortication on 06/02  Thoracic surgery following  Recommending diuresis, patient currently hypertensive  Will await Nephrology recommendations  Patient states that he feels much better this morning  Will continue with incentive spirometry  Denies fevers, chills, night sweats, chest pain, shortness of breath, abdominal pain, nausea, vomiting, diarrhea, constipation, dysuria    Patient still with peripheral edema     OBJECTIVE     Vitals:    21 0600 21 0700 21 1214 21 1335   BP: 109/63 119/86 94/63 93/69   BP Location:  Left arm Left arm    Pulse: (!) 114 (!) 134 (!) 116 (!) 115   Resp: 18 18 18    Temp:  98 1 °F (36 7 °C) 98 1 °F (36 7 °C)    TempSrc:  Oral Oral    SpO2:  99%     Weight: 121 kg (266 lb 5 1 oz)         Temperature:   Temp (24hrs), Av 2 °F (36 8 °C), Min:97 3 °F (36 3 °C), Max:98 8 °F (37 1 °C)    Temperature: 98 1 °F (36 7 °C)  Intake & Output:  I/O        07 -  07 07 -  07 07 -  0700    P  O  522 0     I V  (mL/kg)  2691 7 (20 7)     Blood       IV Piggyback  300     Total Intake(mL/kg) 522 (4) 2991 7 (23)     Urine (mL/kg/hr) 1750 (0 6) 1800 (0 6)     Chest Tube 141 714     Total Output 1891 2514     Net -1369 +477 7                Weights:        Body mass index is 37 14 kg/m²  Weight (last 2 days)     Date/Time   Weight    21 0600   121 (266 32)    21 0544   130 (287 48)    21 0600   130 (287 7)            Physical Exam  Constitutional:       General: He is not in acute distress  Appearance: He is obese  HENT:      Head: Normocephalic and atraumatic  Right Ear: External ear normal       Left Ear: External ear normal       Nose: Nose normal       Mouth/Throat:      Mouth: Mucous membranes are moist       Pharynx: Oropharynx is clear  Eyes:      Extraocular Movements: Extraocular movements intact  Pupils: Pupils are equal, round, and reactive to light  Cardiovascular:      Rate and Rhythm: Tachycardia present  Rhythm irregular  Heart sounds: No murmur  Pulmonary:      Effort: Pulmonary effort is normal       Comments: Rhonchi on the right, improved aeration  Abdominal:      General: Bowel sounds are normal  There is no distension  Palpations: Abdomen is soft  Tenderness: There is no abdominal tenderness  Musculoskeletal:      Right lower leg: Edema present        Left lower leg: Edema present  Skin:     Capillary Refill: Capillary refill takes less than 2 seconds  Findings: No lesion  Neurological:      General: No focal deficit present  Mental Status: He is alert  Psychiatric:         Mood and Affect: Mood normal          Behavior: Behavior normal        LABORATORY DATA     Labs: I have personally reviewed pertinent reports  Results from last 7 days   Lab Units 06/03/21 0443 06/1959 06/02/21  1723  06/02/21  0440 06/01/21  0511   WBC Thousand/uL 7 81 7 26 5 50  --  5 56 5 28   HEMOGLOBIN g/dL 7 2* 7 4* 7 2*  --  7 5* 8 0*   I STAT HEMOGLOBIN   --   --   --    < >  --   --    HEMATOCRIT % 23 6* 23 8* 23 3*  --  24 0* 25 5*   HEMATOCRIT, ISTAT   --   --   --    < >  --   --    PLATELETS Thousands/uL 192 170 168  --  173 190   NEUTROS PCT % 90*  --   --   --  82* 76*   MONOS PCT % 5  --   --   --  10 11    < > = values in this interval not displayed  Results from last 7 days   Lab Units 06/03/21 0443 06/1959 06/02/21  1615 06/02/21  1535 06/02/21  1422  06/02/21  0440 06/01/21  0511   POTASSIUM mmol/L 4 6 4 3  --   --   --   --  4 0 3 8   CHLORIDE mmol/L 102 102  --   --   --   --  98* 95*   CO2 mmol/L 29 29  --   --   --   --  30 31   CO2, I-STAT mmol/L  --   --  29 30 31   < >  --   --    BUN mg/dL 33* 32*  --   --   --   --  34* 36*   CREATININE mg/dL 1 46* 1 24  --   --   --   --  1 34* 1 44*   CALCIUM mg/dL 8 6 9 0  --   --   --   --  9 3 9 4   ALK PHOS U/L 142*  --   --   --   --   --  189* 187*   ALT U/L 17  --   --   --   --   --  18 18   AST U/L 18  --   --   --   --   --  18 19   GLUCOSE, ISTAT mg/dl  --   --  119 108 107  --   --   --     < > = values in this interval not displayed       Results from last 7 days   Lab Units 06/1959 06/01/21  0511 05/29/21  0444   MAGNESIUM mg/dL 2 4 2 4 2 7*          Results from last 7 days   Lab Units 06/02/21  0440   INR  1 18   PTT seconds 40*               IMAGING & DIAGNOSTIC TESTING Radiology Results: I have personally reviewed pertinent reports  Xr Wrist 2 Vw Right    Result Date: 5/31/2021  Impression: No acute osseous abnormality  Workstation performed: [id number]     Ct Chest Wo Contrast    Result Date: 5/30/2021  Impression: Pigtail catheter in right posterior costophrenic sulcus with small loculated right hydropneumothorax, possibly ex vacuo  Masslike opacities in the right middle and right lower lobe due to benign rounded atelectasis  Healed/healing fractures of the anterior right 3rd through 7th ribs, visible on 5/26/2021, new since 2/22/2021  Correlate with recent trauma  Pulmonary artery enlargement which can be seen with pulmonary hypertension  Workstation performed: [id number]     Ct Chest Wo Contrast    Result Date: 5/26/2021  Impression: Improvement in the prior pleural effusion with new hyperdensities consistent with hematomas with pigtail catheter in place  Atelectasis of the middle lobe and right lower lobe unchanged  Workstation performed: [id number]     Ir Chest Tube Check/change/reposition/upsize    Result Date: 5/27/2021  Impression: Impression: 1  Successful placement of a 12 Sinhala right chest tube under ultrasound and fluoroscopic guidance  Workstation performed: [id number]     Other Diagnostic Testing: I have personally reviewed pertinent reports      ACTIVE MEDICATIONS     Current Facility-Administered Medications   Medication Dose Route Frequency    acetaminophen (TYLENOL) tablet 650 mg  650 mg Oral Q6H [address]    bumetanide (BUMEX) injection 2 mg  2 mg Intravenous Once    calcium carbonate (TUMS) chewable tablet 1,000 mg  1,000 mg Oral Daily PRN    carvedilol (COREG) tablet 12 5 mg  12 5 mg Oral BID With Meals    ergocalciferol (VITAMIN D2) capsule 50,000 Units  50,000 Units Oral Weekly    gabapentin (NEURONTIN) capsule 300 mg  300 mg Oral TID    HYDROmorphone HCl (DILAUDID) injection 0 2 mg  0 2 mg Intravenous Q4H PRN    insulin lispro (HumaLOG) 100 units/mL subcutaneous injection 1-6 Units  1-6 Units Subcutaneous TID AC    lidocaine (LMX) 4 % cream   Topical Daily PRN    menthol-methyl salicylate (BENGAY) 72-86 % cream   Apply externally 4x Daily PRN    metoprolol (LOPRESSOR) injection 2 5 mg  2 5 mg Intravenous Q6H PRN    midodrine (PROAMATINE) tablet 10 mg  10 mg Oral TID AC    nicotine (NICODERM CQ) 14 mg/24hr TD 24 hr patch 1 patch  1 patch Transdermal Daily    ondansetron (ZOFRAN) injection 4 mg  4 mg Intravenous Q6H PRN    oxyCODONE (ROXICODONE) IR tablet 2 5 mg  2 5 mg Oral Q4H PRN    oxyCODONE (ROXICODONE) IR tablet 5 mg  5 mg Oral Q4H PRN    polyethylene glycol (MIRALAX) packet 17 g  17 g Oral Daily PRN    senna-docusate sodium (SENOKOT S) 8 6-50 mg per tablet 1 tablet  1 tablet Oral HS       VTE Pharmacologic Prophylaxis: Reason for no pharmacologic prophylaxis Hemothorax  VTE Mechanical Prophylaxis: sequential compression device    Portions of the record may have been created with voice recognition software  Occasional wrong word or "sound a like" substitutions may have occurred due to the inherent limitations of voice recognition software    Read the chart carefully and recognize, using context, where substitutions have occurred   ==  Yudith Burrows, [address] [facility]  Internal Medicine Residency PGY-1

## 2021-06-04 PROBLEM — M25.531 RIGHT WRIST PAIN: Status: RESOLVED | Noted: 2021-01-01 | Resolved: 2021-01-01

## 2021-06-04 NOTE — PROGRESS NOTES
NEPHROLOGY PROGRESS NOTE   Sujata Valladares 57 y o  male MRN: [id number]  Unit/Bed#: [facility] 422-01 Encounter: [id number]  Reason for Consult:  Acute kidney injury    ASSESSMENT/PLAN:  1  Acute kidney injury, likely multifactorial, possible component of ATN given impaired autoregulation in the face of hypotension  2  CKD stage 3 baseline creatinine 1 5-1 8  3  Hyponatremia, secondary to volume overload, improved after [place] x1, Bumex resumed  4  Cardiomyopathy, ejection fraction 60% with likely diastolic dysfunction  5  Right pleural effusions status post chest tube placement, post op VATS and right lung decortication  6  Chronic hypotension, continue with midodrine 3 times daily      PLAN:  · Overall renal function remains stable, within previous baseline  · Bumex as per Cardiology  · Blood pressure appears stable    SUBJECTIVE:  Seen examined  Patient looks comfortable  Denies any shortness of breath  Pain is well controlled  Appetite is stable  Review of Systems    OBJECTIVE:  Current Weight: Weight - Scale: 132 kg (289 lb 14 5 oz)  Vitals:    06/04/21 0600 06/04/21 0701 06/04/21 0800 06/04/21 1048   BP:  113/81  95/54   BP Location:       Pulse:       Resp:  18  17   Temp:  98 1 °F (36 7 °C)  98 4 °F (36 9 °C)   TempSrc:       SpO2:   99%    Weight: 132 kg (289 lb 14 5 oz)          Intake/Output Summary (Last 24 hours) at 6/4/2021 1233  Last data filed at 6/4/2021 0900  Gross per 24 hour   Intake 890 ml   Output 1033 ml   Net -143 ml       Physical Exam  Constitutional:       Appearance: He is obese  He is not ill-appearing  HENT:      Head: Normocephalic and atraumatic  Eyes:      General: No scleral icterus  Cardiovascular:      Rate and Rhythm: Normal rate  Rhythm irregular  Pulmonary:      Effort: Pulmonary effort is normal       Breath sounds: Examination of the right-lower field reveals decreased breath sounds  Decreased breath sounds present  Abdominal:      General: There is no distension  Palpations: Abdomen is soft  Musculoskeletal:      Right lower leg: Edema present  Left lower leg: Edema present  Skin:     General: Skin is warm  Neurological:      Mental Status: He is alert and oriented to person, place, and time           Medications:    Current Facility-Administered Medications:     acetaminophen (TYLENOL) tablet 650 mg, 650 mg, Oral, Q6H [facility], Naty Davenport PA-C, 650 mg at 06/04/21 1129    bumetanide (BUMEX) injection 1 mg, 1 mg, Intravenous, BID, Yennifer Martínez MD    calcium carbonate (TUMS) chewable tablet 1,000 mg, 1,000 mg, Oral, Daily PRN, Naty Davenport PA-C    carvedilol (COREG) tablet 12 5 mg, 12 5 mg, Oral, BID With Meals, Vincent Rm DO, 12 5 mg at 06/04/21 5283    digoxin (LANOXIN) injection 250 mcg, 250 mcg, Intravenous, Q8H, Yennifer Martínez MD, 250 mcg at 06/04/21 1130    ergocalciferol (VITAMIN D2) capsule 50,000 Units, 50,000 Units, Oral, Weekly, Ever Espinal PA-C, 50,000 Units at 05/29/21 1036    gabapentin (NEURONTIN) capsule 300 mg, 300 mg, Oral, TID, Naty Davenport PA-C, 300 mg at 06/04/21 8733    HYDROmorphone HCl (DILAUDID) injection 0 2 mg, 0 2 mg, Intravenous, Q4H PRN, Ever Espinal PA-C, 0 2 mg at 05/31/21 0654    insulin lispro (HumaLOG) 100 units/mL subcutaneous injection 1-6 Units, 1-6 Units, Subcutaneous, TID AC, 1 Units at 06/03/21 1039 **AND** Fingerstick Glucose (POCT), , , TID AC, Getachew Toribio DO    lidocaine (LMX) 4 % cream, , Topical, Daily PRN, Ever Espinal PA-C, Given at 06/02/21 0548    menthol-methyl salicylate (BENGAY) 74-74 % cream, , Apply externally, 4x Daily PRN, Ever Espinal PA-C, Given at 05/30/21 2048    metoprolol (LOPRESSOR) injection 2 5 mg, 2 5 mg, Intravenous, Q6H PRN, Ever Espinal PA-C, 2 5 mg at 06/04/21 0828    midodrine (PROAMATINE) tablet 10 mg, 10 mg, Oral, TID AC, Naty Davenport PA-C, 10 mg at 06/04/21 1129    nicotine (NICODERM CQ) 14 mg/24hr TD 24 hr patch 1 patch, 1 patch, Transdermal, Daily, Naty Davenport PA-C    ondansetron [facility]) injection 4 mg, 4 mg, Intravenous, Q6H PRN, Naty Davenport PA-C    oxyCODONE (ROXICODONE) IR tablet 2 5 mg, 2 5 mg, Oral, Q4H PRN, SANDRA Villavicencio-PADMA    oxyCODONE (ROXICODONE) IR tablet 5 mg, 5 mg, Oral, Q4H PRN, Alferd Sandifer, PA-PADMA, 5 mg at 06/04/21 1132    polyethylene glycol (MIRALAX) packet 17 g, 17 g, Oral, Daily PRN, Alferd Sandifer, PA-C, 17 g at 05/29/21 0209    senna-docusate sodium (SENOKOT S) 8 6-50 mg per tablet 1 tablet, 1 tablet, Oral, HS, Alferd Sandifer, PA-PADMA, 1 tablet at 06/03/21 2135    Laboratory Results:  Results from last 7 days   Lab Units 06/04/21  0438 06/03/21  0443 06/02/21  1959/21  1723 06/02/21  1615 06/02/21  1535 06/02/21  1422 06/02/21  0440 06/01/21  0511  05/31/21  0503 05/30/21  0529 05/29/21  0444   WBC Thousand/uL 8 17 7 81 7 26 5 50  --   --   --  5 56 5 28  --  5 78 4 60 5 11   HEMOGLOBIN g/dL 7 8* 7 2* 7 4* 7 2*  --   --   --  7 5* 8 0*   < > 7 7* 8 0* 7 8*   I STAT HEMOGLOBIN g/dl  --   --   --   --  7 5* 7 8* 7 8*  --   --   --   --   --   --    HEMATOCRIT % 25 6* 23 6* 23 8* 23 3*  --   --   --  24 0* 25 5*   < > 24 5* 25 6* 25 1*   HEMATOCRIT, ISTAT %  --   --   --   --  22* 23* 23*  --   --   --   --   --   --    PLATELETS Thousands/uL 217 192 170 168  --   --   --  173 190  --  187 177 158   POTASSIUM mmol/L 4 1 4 6 4 3  --   --   --   --  4 0 3 8  --  4 0 4 2 3 9   CHLORIDE mmol/L 102 102 102  --   --   --   --  98* 95*  --  98* 99* 98*   CO2 mmol/L 30 29 29  --   --   --   --  30 31  --  30 27 32   CO2, I-STAT mmol/L  --   --   --   --  29 30 31  --   --   --   --   --   --    BUN mg/dL 40* 33* 32*  --   --   --   --  34* 36*  --  40* 47* 52*   CREATININE mg/dL 1 58* 1 46* 1 24  --   --   --   --  1 34* 1 44*  --  1 47* 1 57* 1 51*   CALCIUM mg/dL 9 0 8 6 9 0  --   --   --   --  9 3 9 4  --  9 0 9 0 9 0   MAGNESIUM mg/dL  --   --  2 4  --   --   --   --   --  2 4  --   --   -- 2 7*   GLUCOSE, ISTAT mg/dl  --   --   --   --  119 108 107  --   --   --   --   --   --     < > = values in this interval not displayed

## 2021-06-04 NOTE — PLAN OF CARE
Problem: Potential for Falls  Goal: Patient will remain free of falls  Description: INTERVENTIONS:  - Assess patient frequently for physical needs  -  Identify cognitive and physical deficits and behaviors that affect risk of falls    -  [place] fall precautions as indicated by assessment   - Educate patient/family on patient safety including physical limitations  - Instruct patient to call for assistance with activity based on assessment  - Modify environment to reduce risk of injury  - Consider OT/PT consult to assist with strengthening/mobility  Outcome: Progressing     Problem: PAIN - ADULT  Goal: Verbalizes/displays adequate comfort level or baseline comfort level  Description: Interventions:  - Encourage patient to monitor pain and request assistance  - Assess pain using appropriate pain scale  - Administer analgesics based on type and severity of pain and evaluate response  - Implement non-pharmacological measures as appropriate and evaluate response  - Consider cultural and social influences on pain and pain management  - Notify physician/advanced practitioner if interventions unsuccessful or patient reports new pain  Outcome: Progressing     Problem: INFECTION - ADULT  Goal: Absence or prevention of progression during hospitalization  Description: INTERVENTIONS:  - Assess and monitor for signs and symptoms of infection  - Monitor lab/diagnostic results  - Monitor all insertion sites, i e  indwelling lines, tubes, and drains  - Monitor endotracheal if appropriate and nasal secretions for changes in amount and color  - [place] appropriate cooling/warming therapies per order  - Administer medications as ordered  - Instruct and encourage patient and family to use good hand hygiene technique  - Identify and instruct in appropriate isolation precautions for identified infection/condition  Outcome: Progressing  Goal: Absence of fever/infection during neutropenic period  Description: INTERVENTIONS:  - Monitor WBC    Outcome: Progressing     Problem: SAFETY ADULT  Goal: Patient will remain free of falls  Description: INTERVENTIONS:  - Assess patient frequently for physical needs  -  Identify cognitive and physical deficits and behaviors that affect risk of falls    -  [place] fall precautions as indicated by assessment   - Educate patient/family on patient safety including physical limitations  - Instruct patient to call for assistance with activity based on assessment  - Modify environment to reduce risk of injury  - Consider OT/PT consult to assist with strengthening/mobility  Outcome: Progressing  Goal: Maintain or return to baseline ADL function  Description: INTERVENTIONS:  -  Assess patient's ability to carry out ADLs; assess patient's baseline for ADL function and identify physical deficits which impact ability to perform ADLs (bathing, care of mouth/teeth, toileting, grooming, dressing, etc )  - Assess/evaluate cause of self-care deficits   - Assess range of motion  - Assess patient's mobility; develop plan if impaired  - Assess patient's need for assistive devices and provide as appropriate  - Encourage maximum independence but intervene and supervise when necessary  - Involve family in performance of ADLs  - Assess for home care needs following discharge   - Consider OT consult to assist with ADL evaluation and planning for discharge  - Provide patient education as appropriate  Outcome: Progressing  Goal: Maintain or return mobility status to optimal level  Description: INTERVENTIONS:  - Assess patient's baseline mobility status (ambulation, transfers, stairs, etc )    - Identify cognitive and physical deficits and behaviors that affect mobility  - Identify mobility aids required to assist with transfers and/or ambulation (gait belt, sit-to-stand, lift, walker, cane, etc )  - [place] fall precautions as indicated by assessment  - Record patient progress and toleration of activity level on Mobility SBAR; progress patient to next Phase/Stage  - Instruct patient to call for assistance with activity based on assessment  - Consider rehabilitation consult to assist with strengthening/weightbearing, etc   Outcome: Progressing     Problem: DISCHARGE PLANNING  Goal: Discharge to home or other facility with appropriate resources  Description: INTERVENTIONS:  - Identify barriers to discharge w/patient and caregiver  - Arrange for needed discharge resources and transportation as appropriate  - Identify discharge learning needs (meds, wound care, etc )  - Arrange for interpretive services to assist at discharge as needed  - Refer to Case Management Department for coordinating discharge planning if the patient needs post-hospital services based on physician/advanced practitioner order or complex needs related to functional status, cognitive ability, or social support system  Outcome: Progressing     Problem: Knowledge Deficit  Goal: Patient/family/caregiver demonstrates understanding of disease process, treatment plan, medications, and discharge instructions  Description: Complete learning assessment and assess knowledge base    Interventions:  - Provide teaching at level of understanding  - Provide teaching via preferred learning methods  Outcome: Progressing     Problem: Prexisting or High Potential for Compromised Skin Integrity  Goal: Skin integrity is maintained or improved  Description: INTERVENTIONS:  - Identify patients at risk for skin breakdown  - Assess and monitor skin integrity  - Assess and monitor nutrition and hydration status  - Monitor labs   - Assess for incontinence   - Turn and reposition patient  - Assist with mobility/ambulation  - Relieve pressure over bony prominences  - Avoid friction and shearing  - Provide appropriate hygiene as needed including keeping skin clean and dry  - Evaluate need for skin moisturizer/barrier cream  - Collaborate with interdisciplinary team   - Patient/family teaching  - Consider wound care consult   Outcome: Progressing     Problem: CARDIOVASCULAR - ADULT  Goal: Maintains optimal cardiac output and hemodynamic stability  Description: INTERVENTIONS:  - Monitor I/O, vital signs and rhythm  - Monitor for S/S and trends of decreased cardiac output  - Administer and titrate ordered vasoactive medications to optimize hemodynamic stability  - Assess quality of pulses, skin color and temperature  - Assess for signs of decreased coronary artery perfusion  - Instruct patient to report change in severity of symptoms  Outcome: Progressing  Goal: Absence of cardiac dysrhythmias or at baseline rhythm  Description: INTERVENTIONS:  - Continuous cardiac monitoring, vital signs, obtain 12 lead EKG if ordered  - Administer antiarrhythmic and heart rate control medications as ordered  - Monitor electrolytes and administer replacement therapy as ordered  Outcome: Progressing     Problem: RESPIRATORY - ADULT  Goal: Achieves optimal ventilation and oxygenation  Description: INTERVENTIONS:  - Assess for changes in respiratory status  - Assess for changes in mentation and behavior  - Position to facilitate oxygenation and minimize respiratory effort  - Oxygen administered by appropriate delivery if ordered  - Initiate smoking cessation education as indicated  - Encourage broncho-pulmonary hygiene including cough, deep breathe, Incentive Spirometry  - Assess the need for suctioning and aspirate as needed  - Assess and instruct to report SOB or any respiratory difficulty  - Respiratory Therapy support as indicated  Outcome: Progressing     Problem: GASTROINTESTINAL - ADULT  Goal: Minimal or absence of nausea and/or vomiting  Description: INTERVENTIONS:  - Administer IV fluids if ordered to ensure adequate hydration  - Maintain NPO status until nausea and vomiting are resolved  - Nasogastric tube if ordered  - Administer ordered antiemetic medications as needed  - Provide nonpharmacologic comfort measures as appropriate  - Advance diet as tolerated, if ordered  - Consider nutrition services referral to assist patient with adequate nutrition and appropriate food choices  Outcome: Progressing  Goal: Maintains or returns to baseline bowel function  Description: INTERVENTIONS:  - Assess bowel function  - Encourage oral fluids to ensure adequate hydration  - Administer IV fluids if ordered to ensure adequate hydration  - Administer ordered medications as needed  - Encourage mobilization and activity  - Consider nutritional services referral to assist patient with adequate nutrition and appropriate food choices  Outcome: Progressing  Goal: Maintains adequate nutritional intake  Description: INTERVENTIONS:  - Monitor percentage of each meal consumed  - Identify factors contributing to decreased intake, treat as appropriate  - Assist with meals as needed  - Monitor I&O, weight, and lab values if indicated  - Obtain nutrition services referral as needed  Outcome: Progressing     Problem: METABOLIC, FLUID AND ELECTROLYTES - ADULT  Goal: Electrolytes maintained within normal limits  Description: INTERVENTIONS:  - Monitor labs and assess patient for signs and symptoms of electrolyte imbalances  - Administer electrolyte replacement as ordered  - Monitor response to electrolyte replacements, including repeat lab results as appropriate  - Instruct patient on fluid and nutrition as appropriate  Outcome: Progressing  Goal: Fluid balance maintained  Description: INTERVENTIONS:  - Monitor labs   - Monitor I/O and WT  - Instruct patient on fluid and nutrition as appropriate  - Assess for signs & symptoms of volume excess or deficit  Outcome: Progressing     Problem: SKIN/TISSUE INTEGRITY - ADULT  Goal: Skin integrity remains intact  Description: INTERVENTIONS  - Identify patients at risk for skin breakdown  - Assess and monitor skin integrity  - Assess and monitor nutrition and hydration status  - Monitor labs (i e  albumin)  - Assess for incontinence   - Turn and reposition patient  - Assist with mobility/ambulation  - Relieve pressure over bony prominences  - Avoid friction and shearing  - Provide appropriate hygiene as needed including keeping skin clean and dry  - Evaluate need for skin moisturizer/barrier cream  - Collaborate with interdisciplinary team (i e  Nutrition, Rehabilitation, etc )   - Patient/family teaching  Outcome: Progressing  Goal: Incision(s), wounds(s) or drain site(s) healing without S/S of infection  Description: INTERVENTIONS  - Assess and document risk factors for skin impairment   - Assess and document dressing, incision, wound bed, drain sites and surrounding tissue  - Consider nutrition services referral as needed  - Oral mucous membranes remain intact  - Provide patient/ family education  Outcome: Progressing  Goal: Oral mucous membranes remain intact  Description: INTERVENTIONS  - Assess oral mucosa and hygiene practices  - Implement preventative oral hygiene regimen  - Implement oral medicated treatments as ordered  - Initiate Nutrition services referral as needed  Outcome: Progressing     Problem: HEMATOLOGIC - ADULT  Goal: Maintains hematologic stability  Description: INTERVENTIONS  - Assess for signs and symptoms of bleeding or hemorrhage  - Monitor labs  - Administer supportive blood products/factors as ordered and appropriate  Outcome: Progressing     Problem: MUSCULOSKELETAL - ADULT  Goal: Maintain or return mobility to safest level of function  Description: INTERVENTIONS:  - Assess patient's ability to carry out ADLs; assess patient's baseline for ADL function and identify physical deficits which impact ability to perform ADLs (bathing, care of mouth/teeth, toileting, grooming, dressing, etc )  - Assess/evaluate cause of self-care deficits   - Assess range of motion  - Assess patient's mobility  - Assess patient's need for assistive devices and provide as appropriate  - Encourage maximum independence but intervene and supervise when necessary  - Involve family in performance of ADLs  - Assess for home care needs following discharge   - Consider OT consult to assist with ADL evaluation and planning for discharge  - Provide patient education as appropriate  Outcome: Progressing  Goal: Maintain proper alignment of affected body part  Description: INTERVENTIONS:  - Support, maintain and protect limb and body alignment  - Provide patient/ family with appropriate education  Outcome: Progressing

## 2021-06-04 NOTE — PLAN OF CARE
Problem: PHYSICAL THERAPY ADULT  Goal: Performs mobility at highest level of function for planned discharge setting  See evaluation for individualized goals  Description: Treatment/Interventions: Functional transfer training, LE strengthening/ROM, Elevations, Therapeutic exercise, Endurance training, Bed mobility, Gait training, Spoke to nursing, Spoke to case management, OT          See flowsheet documentation for full assessment, interventions and recommendations  Note: Prognosis: Good  Problem List: Decreased strength, Decreased endurance, Impaired balance, Decreased mobility, Pain  Assessment: Pt is a 57 y o  male seen for PT re-evaluation s/p VATs with decortication on 6/2  Pt was initially admitted to [facility] on 5/26/21 with a primary dx of: pleural effusion on R  PT now consulted for assessment of mobility and d/c needs  Pt with Ambulate, PT evaluation orders  Pts current comorbidities effecting treatment include: A-fib, cardiac disease, CHF, DM, dilated cardiomyopathy, recurrent pneumonia, HTN, morbid obesity, neuropathy  Prior to admission, pt was I with ADLs and ambulating w/o AD  Pt lives with his wife in a multi-story home with 4 [address]ZEKI[address] and main bedroom/bathroom on 2nd floor; has FFSU if needed  At time of re-evaluation, pt currently is requiring supervision for transfers and supervision for ambulation 20 ft w/ RW  Pt presents at PT eval functioning below baseline and currently w/ overall mobility deficits 2* to: BLE weakness, impaired balance, decreased endurance, gait deviations, pain, decreased activity tolerance compared to baseline, decreased functional mobility tolerance compared to baseline, SOB upon exertion  Pt would benefit from follow up PT session to trial LRAD vs  No AD as well as for stair training  Pt currently at a fall risk 2* to impairments listed above    Pt will continue to benefit from skilled acute PT interventions to address stated impairments; to maximize functional mobility; for ongoing pt/ family training; and DME needs  At conclusion of PT session pt returned back in chair with phone and call bell within reach  Pt denies any further questions at this time  Recommend home with social support upon hospital D/C  PT Discharge Recommendation: No rehabilitation needs     PT - OK to Discharge: Yes    See flowsheet documentation for full assessment

## 2021-06-04 NOTE — PHYSICAL THERAPY NOTE
Physical Therapy Re-Evaluation    Patient's Name: Maria Fernanda Bellamy    Admitting Diagnosis  Pleural effusion [J90]    Problem List  Patient Active Problem List   Diagnosis    Atrial fibrillation ([facility] )    Chronic diastolic (congestive) heart failure (HCC)    Type 2 diabetes mellitus with diabetic chronic kidney disease ([facility] )    Class 3 severe obesity due to excess calories with serious comorbidity and body mass index (BMI) of 45 0 to 49 9 in adult [facility])    Lymphedema    Chronic venous insufficiency    Varicose veins of both lower extremities with inflammation    Hypertensive heart and chronic kidney disease with heart failure and stage 1 through stage 4 chronic kidney disease, or chronic kidney disease (HCC)    STEFF (obstructive sleep apnea)    Pleural effusion on right    SOB (shortness of breath)    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 ascites (HCC)    CKD (chronic kidney disease) stage 3, GFR 30-59 ml/min (HCC)    Hyperkalemia    Tobacco abuse    Chronic anemia    Thrombocytopenia (HCC)    Hypokalemia    Other cirrhosis of liver ([facility] )    Encounter for screening for other viral diseases     Lactic acidosis    Permanent atrial fibrillation ([facility] )    Diverticulosis of colon    Lesion of spleen    Type 2 diabetes mellitus with hyperglycemia, without long-term current use of insulin (HCC)    Acute on chronic diastolic CHF (congestive heart failure) ([facility] )    Tobacco use    Constipation    Pulmonary hypertension (HCC)    Vitamin D deficiency    Atelectasis    Anemia    Hypercalcemia    Acute kidney injury superimposed on CKD ([facility] )    Acute renal failure superimposed on stage 3b chronic kidney disease ([facility] )    Hypertensive nephrosclerosis    Chronic kidney disease-mineral and bone disorder    Hypotension    Morbid (severe) obesity due to excess calories ([facility] )    Smoking       Past Medical History  Past Medical History:   Diagnosis Date    A-fib [facility])     Cardiac disease     Chronic combined systolic (congestive) and diastolic (congestive) heart failure (HCC)     Diabetes mellitus ([facility] )     Dilated cardiomyopathy ([facility] )     History of echocardiogram 11/24/2014    EF 0 30 (30%), Likely mod LV systolic dysfunction  Likely RV dysfunction as well   History of Lexiscan MPI 02/19/2016    EF 0 43 (43%), no prior MI or ischemia   Hx of echocardiogram 04/28/2017    Normal EF, Normal LV systolic function  Mild concentric LV hypertrophy  Mild mitral and tricuspid regurg   Hx: recurrent pneumonia     Hypertension     Long term (current) use of anticoagulants     Morbid (severe) obesity due to excess calories (HCC)     Neuropathy        Past Surgical History  Past Surgical History:   Procedure Laterality Date    HERNIA REPAIR      IR CHEST TUBE CHECK/CHANGE/REPOSITION/UPSIZE  5/27/2021    IR CHEST TUBE PLACEMENT  5/22/2021    IR THORACENTESIS  8/25/2020    IR THORACENTESIS  2/23/2021    LUNG DECORTICATION Right 6/2/2021    Procedure: DECORTICATION LUNG;  Surgeon: Chan Maciel MD;  [facility];  Service: Thoracic    SPLENECTOMY, TOTAL      THORACOSCOPY VIDEO ASSISTED SURGERY (VATS) Right 6/2/2021    Procedure: THORACOSCOPY VIDEO ASSISTED SURGERY (VATS); Surgeon: Chan Maciel MD;  [facility];  Service: Thoracic    VASCULAR SURGERY Right     leg        06/04/21 1431   PT Last Visit   PT Visit Date 06/04/21   Note Type   Note type Re-Evaluation   Pain Assessment   Pain Assessment Tool FLACC   Pain Location/Orientation Location: Chest;Location: Incision   Hospital Pain Intervention(s) Repositioned; Ambulation/increased activity   Pain Rating: FLACC (Rest) - Face 0   Pain Rating: FLACC (Rest) - Legs 0   Pain Rating: FLACC (Rest) - Activity 0   Pain Rating: FLACC (Rest) - Cry 0   Pain Rating: FLACC (Rest) - Consolability 0   Score: FLACC (Rest) 0   Pain Rating: FLACC (Activity) - Face 0   Pain Rating: FLACC (Activity) - Legs 0   Pain Rating: FLACC (Activity) - Activity 0   Pain Rating: FLACC (Activity) - Cry 0   Pain Rating: FLACC (Activity) - Consolability 0   Score: FLACC (Activity) 0   Home Living   Type of Home House   Home Layout Multi-level; Able to live on main level with bedroom/bathroom;Stairs to enter with rails   Home Equipment Cane   Additional Comments Pt lives in a multi-story home with 4 EZEKIEL  Was ambulating I w/o AD PTA  Prior Function   Level of [place] Independent with ADLs and functional mobility   Lives With Spouse   Receives Help From Family   ADL Assistance Independent   IADLs Independent   Falls in the last 6 months 1 to 4  (1 per pt)   Vocational Part time employment   Restrictions/Precautions   Weight Bearing Precautions Per Order No   Other Precautions Multiple lines;Telemetry; Fall Risk  (CT)   Cognition   Overall Cognitive Status WFL   Attention Attends with cues to redirect   Orientation Level Oriented X4   Memory Within functional limits   Following Commands Follows all commands and directions without difficulty   Comments pt agreeable to mobilize with therapy   RLE Assessment   RLE Assessment WFL   LLE Assessment   LLE Assessment WFL   Bed Mobility   Additional Comments Pt seated OOB upon arrival, not assessed   Transfers   Sit to Stand 5  Supervision   Stand to Sit 5  Supervision   Additional Comments Transfers with RW   Ambulation/Elevation   Gait pattern Excessively slow; Short stride;Decreased foot clearance   Gait Assistance 5  Supervision   Additional items Assist x 1   Assistive Device Rolling walker   Distance 220ft with 1 standing rest break CHCF   Tachy with ambulation- RN aware   Balance   Static Sitting Normal   Dynamic Sitting Good   Static Standing Fair +   Dynamic Standing Fair   Ambulatory Fair -   Endurance Deficit   Endurance Deficit Yes   Endurance Deficit Description fatigue, weakness   Activity Tolerance   Activity Tolerance Patient limited by fatigue   Nurse Made Aware ok to see per RN   Assessment   Prognosis Good   Problem List Decreased strength;Decreased endurance; Impaired balance;Decreased mobility;Pain   Assessment Pt is a 57 y o  male seen for PT re-evaluation s/p VATs with decortication on 6/2  Pt was initially admitted to [facility] on 5/26/21 with a primary dx of: pleural effusion on R  PT now consulted for assessment of mobility and d/c needs  Pt with Ambulate, PT evaluation orders  Pts current comorbidities effecting treatment include: A-fib, cardiac disease, CHF, DM, dilated cardiomyopathy, recurrent pneumonia, HTN, morbid obesity, neuropathy  Prior to admission, pt was I with ADLs and ambulating w/o AD  Pt lives with his wife in a multi-story home with 4 EZEKIEL and main bedroom/bathroom on 2nd floor; has FFSU if needed  At time of re-evaluation, pt currently is requiring supervision for transfers and supervision for ambulation 20 ft w/ RW  Pt presents at PT eval functioning below baseline and currently w/ overall mobility deficits 2* to: BLE weakness, impaired balance, decreased endurance, gait deviations, pain, decreased activity tolerance compared to baseline, decreased functional mobility tolerance compared to baseline, SOB upon exertion  Pt would benefit from follow up PT session to trial LRAD vs  No AD as well as for stair training  Pt currently at a fall risk 2* to impairments listed above  Pt will continue to benefit from skilled acute PT interventions to address stated impairments; to maximize functional mobility; for ongoing pt/ family training; and DME needs  At conclusion of PT session pt returned back in chair with phone and call bell within reach  Pt denies any further questions at this time  Recommend home with social support upon hospital D/C  Goals   Patient Goals to go home and eat a steak sandwhich   STG Expiration Date 06/14/21   Short Term Goal #1 In 10 days pt will be able to: 1  Demonstrate ability to perform all aspects of bed mobility independently to increase functional independence    2  Perform functional transfers with at mod I level to facilitate safe return to previous living environment  3   Ambulate 300 ft with LRAD at mod I level with stable vitals to improve safety with household distances and reduce fall risk  4  Climb 4+12 steps with HR independently to simulate entrance to home  5  Improve LE strength grades by 1 to increase ease of functional mobility with transfers and gait  6  Pt will demonstrate improved balance by one grade order to decrease risk of falls  Plan   Treatment/Interventions Functional transfer training;LE strengthening/ROM; Elevations; Therapeutic exercise; Endurance training;Bed mobility;Gait training;Spoke to nursing;Spoke to case management;OT   PT Frequency Other (Comment)  (3-5x/wk)   Recommendation   PT Discharge Recommendation No rehabilitation needs   Additional Comments Pt would benefit from follow up treatment session to assess mobility with LRAD vs  no AD as well as stair trial     AM-PAC Basic Mobility Inpatient   Turning in Bed Without Bedrails 4   Lying on Back to Sitting on Edge of Flat Bed 3   Moving Bed to Chair 3   Standing Up From Chair 3   Walk in Room 3   Climb 3-5 Stairs 3   Basic Mobility Inpatient Raw Score 19   Basic Mobility Standardized Score 42 48   Modified Canadian Scale   Modified Ortiz Scale 3     Follow Up Treatment Session (2:22-2:31)    S: "I do my exercises all the time "    O: Pt seated in recliner after evaluation, agreeable to review seated therex program  Pt was able to perform the following bilaterally: heel raises x20, LAQs 2x10, and seated marches 2x10 with seated rest breaks between sets  A: Pt demonstrated good technique with all seated TE  Educated pt on performing these exercises 3x/day in order to improve overall LE strength, endurance and activity tolerance  Pt verbalized understanding  P: Pt would benefit from continued acute skilled PT to allow for return to PLOF and safe d/c home       Darien Haines, PT, DPT

## 2021-06-04 NOTE — CASE MANAGEMENT
Pt's LOS is 8 days  Pt is not yet medically ready for d/c  Pt is S/P R VATS Decortication  Pt has no aftercare needs from Case Mgt  Once pt is medically cleared for d/c by SOD-C, pt will d/c to home w/ no needs  CM to follow

## 2021-06-04 NOTE — PROGRESS NOTES
Cardiology Progress Note - Baltazar Flores 57 y o  male MRN: [id number]    Unit/Bed#: [facility] 422-01 Encounter: [id number]        Subjective:    No significant events overnight  Feels well  Still with elevated heart rate  Review of Systems   Cardiovascular: Negative for chest pain, leg swelling and palpitations  Respiratory: Negative for shortness of breath  Objective:   Vitals: Blood pressure 113/81, pulse 90, temperature 98 1 °F (36 7 °C), resp  rate 18, weight 132 kg (289 lb 14 5 oz), SpO2 99 %  , Body mass index is 40 43 kg/m² ,   Orthostatic Blood Pressures      Most Recent Value   Blood Pressure  113/81 filed at 06/04/2021 0701   Patient Position - Orthostatic VS  Sitting filed at 06/03/2021 1465         Systolic (43JBK), CYH:518 , Min:93 , JPR:575     Diastolic (00OIE), RZM:27, Min:63, Max:81      Intake/Output Summary (Last 24 hours) at 6/4/2021 0934  Last data filed at 6/4/2021 0601  Gross per 24 hour   Intake 770 ml   Output 1503 ml   Net -733 ml     Weight (last 2 days)     Date/Time   Weight    06/04/21 0600   132 (289 91)    06/03/21 0600   121 (266 32)    06/02/21 0544   130 (287 48)                  Telemetry Review:     Physical Exam  Cardiovascular:      Rate and Rhythm: Tachycardia present  Rhythm irregular  Heart sounds: Normal heart sounds  No murmur  No friction rub  No gallop  Pulmonary:      Breath sounds: Decreased breath sounds present  No wheezing or rales             Laboratory Results:        CBC with diff:   Results from last 7 days   Lab Units 06/04/21  0438 06/03/21  0443 06/02/21  1959/21  1723 06/02/21  1615 06/02/21  1535 06/02/21  1422 06/02/21  0440 06/01/21  0511  05/31/21  0503   WBC Thousand/uL 8 17 7 81 7 26 5 50  --   --   --  5 56 5 28  --  5 78   HEMOGLOBIN g/dL 7 8* 7 2* 7 4* 7 2*  --   --   --  7 5* 8 0*   < > 7 7*   I STAT HEMOGLOBIN g/dl  --   --   --   --  7 5* 7 8* 7 8*  --   --   --   --    HEMATOCRIT % 25 6* 23 6* 23 8* 23 3*  --   --   -- 24 0* 25 5*   < > 24 5*   HEMATOCRIT, ISTAT %  --   --   --   --  22* 23* 23*  --   --   --   --    MCV fL 99* 98 98 98  --   --   --  96 97  --  96   PLATELETS Thousands/uL 217 192 170 168  --   --   --  173 190  --  187   MCH pg 30 1 29 9 30 3 30 4  --   --   --  29 9 30 4  --  30 2   MCHC g/dL 30 5* 30 5* 31 1* 30 9*  --   --   --  31 3* 31 4  --  31 4   RDW % 16 6* 15 8* 15 9* 16 0*  --   --   --  15 9* 16 4*  --  16 6*   MPV fL 10 5 10 1 9 9 9 7  --   --   --  10 3 10 4  --  10 2   NRBC AUTO /100 WBCs 0 0  --   --   --   --   --  0 0  --  0    < > = values in this interval not displayed           CMP:  Results from last 7 days   Lab Units 06/04/21  0438 06/03/21  0443 06/02/21  1960/21  1615 06/02/21  1535 06/02/21  1422 06/02/21  0440 06/01/21  0511 05/31/21  0503 05/30/21  0529   POTASSIUM mmol/L 4 1 4 6 4 3  --   --   --  4 0 3 8 4 0 4 2   CHLORIDE mmol/L 102 102 102  --   --   --  98* 95* 98* 99*   CO2 mmol/L 30 29 29  --   --   --  30 31 30 27   CO2, I-STAT mmol/L  --   --   --  29 30 31  --   --   --   --    BUN mg/dL 40* 33* 32*  --   --   --  34* 36* 40* 47*   CREATININE mg/dL 1 58* 1 46* 1 24  --   --   --  1 34* 1 44* 1 47* 1 57*   GLUCOSE, ISTAT mg/dl  --   --   --  119 108 107  --   --   --   --    CALCIUM mg/dL 9 0 8 6 9 0  --   --   --  9 3 9 4 9 0 9 0   AST U/L 17 18  --   --   --   --  18 19 19  --    ALT U/L 13 17  --   --   --   --  18 18 18  --    ALK PHOS U/L 161* 142*  --   --   --   --  189* 187* 190*  --    EGFR ml/min/1 73sq m 46 51 62  --   --   --  56 52 50 47         BMP:  Results from last 7 days   Lab Units 06/04/21  0438 06/03/21  0443 06/02/21  1959/21  1615 06/02/21  1535 06/02/21  1422  06/02/21  0440 06/01/21  0511 05/31/21  0503 05/30/21  0529   POTASSIUM mmol/L 4 1 4 6 4 3  --   --   --   --  4 0 3 8 4 0 4 2   CHLORIDE mmol/L 102 102 102  --   --   --   --  98* 95* 98* 99*   CO2 mmol/L 30 29 29  --   --   --   --  30 31 30 27   CO2, I-STAT mmol/L  --   --   --  29 30 31  --   --   --   --   --    BUN mg/dL 40* 33* 32*  --   --   --   --  34* 36* 40* 47*   CREATININE mg/dL 1 58* 1 46* 1 24  --   --   --   --  1 34* 1 44* 1 47* 1 57*   GLUCOSE, ISTAT mg/dl  --   --   --  119 108 107   < >  --   --   --   --    CALCIUM mg/dL 9 0 8 6 9 0  --   --   --   --  9 3 9 4 9 0 9 0    < > = values in this interval not displayed  BNP:   Results Reviewed     None        No results for input(s): BNP in the last 72 hours  Magnesium:   Results from last 7 days   Lab Units 21  0511 21  0444   MAGNESIUM mg/dL 2 4 2 4 2 7*       Coags:   Results from last 7 days   Lab Units 21  0440   PTT seconds 40*   INR  1 18       TSH:       Lipid Profile:             Cardiac testing:   Results for orders placed during the hospital encounter of 21   Echo complete with contrast if indicated    Narrative [address] [facility] [address] [facility] [address], [address]  [phone]    Transthoracic Echocardiogram  2D, M-mode, Doppler, and Color Doppler    Study date:  2021    Patient: Verneita Dakins  MR number: [id number]  Account number: [de-identified]  : 1960  Age: 57 years  Gender: Male  Status: Inpatient  Location: Bedside  Height: 71 in  Weight: 350 lb  BP: 130/ 80 mmHg    Indications: shortness of breath    Diagnoses: 428 0 - CONGESTIVE HEART FAILURE    Sonographer:  Uriel 61, CCT  Primary Physician:  Melissa Lee DO  Referring Physician:  Margaret Muñiz DO  Group:  [facility]  Interpreting Physician:  Sacha Ordonez DO    SUMMARY    LEFT VENTRICLE:  Systolic function was normal  Ejection fraction was estimated to be 60 %  There were no regional wall motion abnormalities  Wall thickness was mildly increased  RIGHT VENTRICLE:  The ventricle was moderately dilated  Systolic function was mildly reduced  LEFT ATRIUM:  The atrium was mildly dilated      RIGHT ATRIUM:  The atrium was moderately dilated  MITRAL VALVE:  There was mild regurgitation  TRICUSPID VALVE:  There was moderate regurgitation  Estimated peak PA pressure was 52 mmHg  HISTORY: PRIOR HISTORY: CHF, morbid obesity    PROCEDURE: The procedure was performed at the bedside  This was a routine study  The transthoracic approach was used  The study included complete 2D imaging, M-mode, complete spectral Doppler, and color Doppler  The heart rate was 80 bpm,  at the start of the study  Echocardiographic views were limited due to poor patient compliance and poor acoustic window availability  This was a technically difficult study  LEFT VENTRICLE: Size was normal  Systolic function was normal  Ejection fraction was estimated to be 60 %  There were no regional wall motion abnormalities  Wall thickness was mildly increased  DOPPLER: The study was not technically  sufficient to allow evaluation of LV diastolic function  RIGHT VENTRICLE: The ventricle was moderately dilated  Systolic function was mildly reduced  LEFT ATRIUM: The atrium was mildly dilated  RIGHT ATRIUM: The atrium was moderately dilated  MITRAL VALVE: Valve structure was normal  There was normal leaflet separation  DOPPLER: The transmitral velocity was within the normal range  There was no evidence for stenosis  There was mild regurgitation  AORTIC VALVE: The valve was trileaflet  Leaflets exhibited normal thickness and normal cuspal separation  DOPPLER: Transaortic velocity was within the normal range  There was no evidence for stenosis  There was no regurgitation  TRICUSPID VALVE: The valve structure was normal  There was normal leaflet separation  DOPPLER: The transtricuspid velocity was within the normal range  There was no evidence for stenosis  There was moderate regurgitation  Estimated peak PA  pressure was 52 mmHg  PULMONIC VALVE: Leaflets exhibited normal thickness, no calcification, and normal cuspal separation   DOPPLER: The transpulmonic velocity was within the normal range  There was no regurgitation  PERICARDIUM: There was no pericardial effusion  The pericardium was normal in appearance  AORTA: The root exhibited normal size  SYSTEMIC VEINS: IVC: The inferior vena cava was not well visualized  SYSTEM MEASUREMENT TABLES    2D  %FS: 31 45 %  Ao Diam: 3 22 cm  EDV(Teich): 148 86 ml  EF(Teich): 58 74 %  ESV(Teich): 61 42 ml  IVSd: 1 51 cm  LA Area: 20 63 cm2  LA Diam: 5 65 cm  LVIDd: 5 52 cm  LVIDs: 3 79 cm  LVPWd: 1 08 cm  RA Area: 33 86 cm2  RWT: 0 39  SV(Teich): 87 44 ml    CW  RAP: 0 mmHg  TR Vmax: 3 47 m/s  TR maxP 33 mmHg    PW  E' Sept: 0 09 m/s  E/E' Sept: 13 35  MV A Ti: 0 48 m/s  MV Dec [place]: 4 5 m/s2  MV DecT: 253 15 ms  MV E Ti: 1 14 m/s  MV E/A Ratio: 2 36  RVSP: 48 33 mmHg    [facility]    Prepared and electronically signed by    Tanisha Cobian DO  Signed 2021 08:06:00       No results found for this or any previous visit  No results found for this or any previous visit  No results found for this or any previous visit      Meds/Allergies   Current Facility-Administered Medications   Medication Dose Route Frequency Provider Last Rate    acetaminophen  650 mg Oral Q6H [address] Naty M JAVAD [place]      bumetanide  1 mg Intravenous BID Faustine Tor, DO      calcium carbonate  1,000 mg Oral Daily PRN Douglas Almaraz PA-C      carvedilol  12 5 mg Oral BID With Meals Faustine Tor, DO      ergocalciferol  50,000 Units Oral Weekly Douglas Almaraz PA-C      gabapentin  300 mg Oral TID Douglas Almaraz PA-C      HYDROmorphone  0 2 mg Intravenous Q4H PRN Shefali [place] PA-C      insulin lispro  1-6 Units Subcutaneous TID AC Faustine Tor, DO      lidocaine   Topical Daily PRN Douglas Almaraz PA-C      menthol-methyl salicylate   Apply externally 4x Daily PRN Douglas Almaraz PA-C      metoprolol  2 5 mg Intravenous Q6H PRN Shefali Kapadia JAVAD [place]      midodrine  10 mg Oral TID AC Koreen Hashimoto, PA-C      nicotine  1 patch Transdermal Daily Koreen Hashimoto, [place]      ondansetron  4 mg Intravenous Q6H PRN Koreen Hashimoto, PA-C      oxyCODONE  2 5 mg Oral Q4H PRN Koreen Hashimoto, PA-C      oxyCODONE  5 mg Oral Q4H PRN Koreen Hashimoto, PA-C      polyethylene glycol  17 g Oral Daily PRN Koreen Hashimoto, PA-C      senna-docusate sodium  1 tablet Oral HS Koreen Hashimoto, PA-C          Medications Prior to Admission   Medication    carvedilol (COREG) 6 25 mg tablet    docusate sodium (COLACE) 100 mg capsule    ergocalciferol (ERGOCALCIFEROL) 1 25 MG (23224 UT) capsule    ferrous sulfate 324 (65 Fe) mg    furosemide (LASIX) 80 mg tablet    glucose blood test strip    Lancets (freestyle) lancets    lisinopril (ZESTRIL) 5 mg tablet    metolazone (ZAROXOLYN) 5 mg tablet    omeprazole (PriLOSEC) 20 mg delayed release capsule    polyethylene glycol (MIRALAX) 17 g packet    potassium chloride (K-DUR,KLOR-CON) 20 mEq tablet    warfarin (COUMADIN) 5 mg tablet       Assessment:  Principal Problem:    Pleural effusion on right  Active Problems:    Atrial fibrillation (HCC)    Chronic diastolic (congestive) heart failure (HCC)    Type 2 diabetes mellitus with diabetic chronic kidney disease (HCC)    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 ascites (HCC)    CKD (chronic kidney disease) stage 3, GFR 30-59 ml/min (HCC)    Chronic anemia    Acute kidney injury superimposed on CKD (HCC)    Hypotension    Right wrist pain      Impression:  1  Recurrent R pleural effusion (possibly due to hepatic cirrhosis) s/p VATS decortication 6/2 - still with drainage  2  Anemia - s/p txf 1U PRBC yesterday  3  Persistent atrial fibrillation - rate not adequately controlled  May improve once CT removed  Holding anticoagulation per thoracic surgery  4  Chronic diastolic heart failure - have been holding diuretics due to hypotension  Plan:  1  Reload with digoxin    2  Continue IV diuretics  3  Continue remainder of medications

## 2021-06-04 NOTE — PROGRESS NOTES
Progress Note - Thoracic Surgery   Marta Goldstein 57 y o  male MRN: [id number]  Unit/Bed#: [facility] 422-01 Encounter: [id number]    Assessment:  62yo M with R pleural effusion, s/p R chest tube placement x2 with incomplete drainage, now s/p R VATS decortication and chest tube placement x2 (6/2/21)     R CT x2: 453 cc serosanguineous (-20, small air leak with cough)    Hgb 7 8 (7 2) s/p 1u pRBC yesterday    Plan:   Maintain R chest tubes to suction  o Plan to place to water seal tomorrow   Incentive spirometry   Pulmonary toilet   OOB/ambulate in halls   Continue to hold therapeutic AC i/s/o persistent anemia requiring transfusion    Subjective/Objective     Subjective:   No acute events overnight  Reports he feels pretty well overall  Ambulated in halls yesterday  Pulling 1000 on IS now  Pain controlled  Objective:    Blood pressure 113/81, pulse 90, temperature 98 1 °F (36 7 °C), resp  rate 18, weight 132 kg (289 lb 14 5 oz), SpO2 99 %  ,Body mass index is 40 43 kg/m²        Intake/Output Summary (Last 24 hours) at 6/4/2021 0718  Last data filed at 6/4/2021 0601  Gross per 24 hour   Intake 1070 ml   Output 1503 ml   Net -433 ml       Invasive Devices     Peripheral Intravenous Line            Peripheral IV 06/02/21 Right Arm 1 day    Peripheral IV 06/02/21 Right;Upper;Ventral (anterior) Arm 1 day          Drain            Chest Tube 1 Right Pleural 28 Fr  1 day    Chest Tube 2 Right Pleural 28 Fr  1 day                Physical Exam:   Gen:  NAD  CV:  warm, well-perfused  Lungs: nl effort on RA   R CT x2 in place, small air leak with cough  Abd:  soft, NT/ND  Ext:  no CCE  Neuro: A&Ox3     Results from last 7 days   Lab Units 06/04/21 0438 06/03/21 0443 06/1959   WBC Thousand/uL 8 17 7 81 7 26   HEMOGLOBIN g/dL 7 8* 7 2* 7 4*   HEMATOCRIT % 25 6* 23 6* 23 8*   PLATELETS Thousands/uL 217 192 170     Results from last 7 days   Lab Units 06/04/21 0438 06/03/21 0443 06/1959 06/02/21  1615 POTASSIUM mmol/L 4 1 4 6 4 3  --    CHLORIDE mmol/L 102 102 102  --    CO2 mmol/L 30 29 29  --    CO2, I-STAT mmol/L  --   --   --  29   BUN mg/dL 40* 33* 32*  --    CREATININE mg/dL 1 58* 1 46* 1 24  --    GLUCOSE, ISTAT mg/dl  --   --   --  119   CALCIUM mg/dL 9 0 8 6 9 0  --      Results from last 7 days   Lab Units 06/02/21  0440   INR  1 18   PTT seconds 40*

## 2021-06-04 NOTE — PROGRESS NOTES
INTERNAL MEDICINE RESIDENCY PROGRESS NOTE     Name: Rossi Hernandez   Age & Sex: 57 y o  male   MRN: [id number]  Unit/Bed#: [address] 422-01   Encounter: [id number]  Team: SOD Team C     PATIENT INFORMATION     Name: Rossi Hernandez   Age & Sex: 57 y o  male   MRN: [id number]  Hospital Stay Days: 9    ASSESSMENT/PLAN     Principal Problem:    Pleural effusion on right  Active Problems:    Acute kidney injury superimposed on CKD [facility])    Atrial fibrillation (HCC)    Chronic diastolic (congestive) heart failure (HCC)    Type 2 diabetes mellitus with diabetic chronic kidney disease (HCC)    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 ascites (HCC)    CKD (chronic kidney disease) stage 3, GFR 30-59 ml/min (HCC)    Chronic anemia    Hypotension      * Pleural effusion on right  Assessment & Plan  Patient transferred to [place] from [facility] for thoracic surgery evaluation  History of recurrent right-sided pleural effusion s/p thoracentesis on 05/22, chest tube placed with continued output of bloody fluid  Fluid-exudative but negative for malignancy, cultures negative      Plan:  · Repeat chest x-ray on 05/24 with continued effusion  · CT scan performed on 05/26 revealed pleural effusion improvement with new hyperdensities consistent with lung hematomas  · Consulted thoracic surgery for evaluation  · Management per thoracic surgery  · S/p tpa/DNAse on 5 29 with good output  · CT of the chest on 05/30 revealed a small loculated right hydropneumothorax and benign rounded atelectasis  · Patient is now status post VATS with decortication on the right on 06/02, follow thoracic surgery recommendations:  Continue on suction, possible water-seal tomorrow, may start anticoagulation if hemoglobin remains stable without transfusion  · Cont monitoring the output  · IS, pulmonary toilet  · Pain control  · Bowel regimen in place    Acute kidney injury superimposed on CKD [facility])  Assessment & Plan  Likely secondary to episodes of hypotension with anemia vs cardiorenal     Plan:  · Nephrology following, appreciate reocmmendations  · Baseline Cr 1 5-1 8, admitted with Cr of 2 8 on 5/21/2021  · Cr improved to baseline, slightly worsened overnight creatinine now 1 58  · Patient continued on midodrine  · Continue holding home med Lisinopril 5mg for now  · Ultimately will require outpatient follow up with pt's nephrologist Dr Dinora Trevino    Atrial fibrillation [facility])  Assessment & Plan  HR elevated due to resp status  History of Afib  Plan:  · On Coreg 6 25mg BID, increase to 12 5 twice daily, required 1 time dose of metoprolol 2 5 mg in the morning of 6/3  · Chronic anticoagulation with Coumadin 5 milligrams Sunday, Tuesday, Wednesday, Friday, Saturday and 2 5 milligrams Monday and Thursday  · Coumadin currently on hold given anemia and bloody output from chest tube  · Cardiology following, started digoxin on 06/04  · [id number]    Hypotension  Assessment & Plan  In the setting of output from chest tube plus Afib/CHF medications (Coreg 6 25 BID, Lasix 80mg BID)    Plan:  · Midodrine increased to 10mg TID    Chronic anemia  Assessment & Plan  With baseline hemoglobin approximately 9 5  Now acute on chronic d/t blood loss  Plan:  · Patient received 1 unit of PRBC on 05/25/2021 due to bloody chest tube drainage and hematoma  · Stool occult neg  · Given another 1 U PRBC on 5/27/2021  · Additional unit of packed red blood cells given on 05/31  · Hemoglobin down to 7 2 on 06/03, given another unit of packed red blood cells  · Hemoglobin up to 7 8, continue to monitor-if the hemoglobin is stable for the next 24 hours without transfusion, may consider restarting anticoagulation  · Continue to monitor    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 ascites [facility])  Assessment & Plan  Last seen by outpatient GI on 04/08  [facility]  [facility] is up-to-date  Last EGD did not show any esophageal varices      Plan:  · Suspect fatty liver vs cardiac in nature  · MELD of 18, Child Malloy class B  · GI now recommending outpatient transjugular liver biopsy along with portal pressure measurements by IR    Type 2 diabetes mellitus with diabetic chronic kidney disease [facility])  Assessment & Plan  Lab Results   Component Value Date    HGBA1C 5 5 05/21/2021     Plan:   · Controlled hemoglobin A1c  Not on any home meds  · Patient with a point of care blood glucose 316 on 6/3, initiated sliding scale insulin algorithm 3    Chronic diastolic (congestive) heart failure (HCC)  Assessment & Plan  Wt Readings from Last 3 Encounters:   06/02/21 130 kg (287 lb 7 7 oz)   05/26/21 131 kg (288 lb 12 8 oz)   05/22/21 (!) 136 kg (300 lb 0 7 oz)     Patient appears slightly hypervolemic with LE edema  Followed by Dr Ciro Gaspar OP  Home regimen: lasix 80 mg BID, metolazone 5 mg twice a week  Plan:   · Bumex 1 milligram twice daily initiated on 06/04  · Weight has remained stable   · Continue intake and output and daily weights  · Cardiology following, appreciate recommendations      Right wrist pain-resolved as of 6/4/2021  Assessment & Plan  Patient with pain in his wrist after waking up on 05/31  Has been 10/10 at times  X-ray negative for osseous abnormalities  Wrist splint with minimal relief  Patient unable to receive NSAIDs in the setting of renal disease  Plan:  · Trial 1 time dose of prednisone 40 mg  · Continue monitor, patient has been improving      Disposition:  Continued chest tubes on suction  Were sealed tomorrow  Possible restart anticoagulation tomorrow  Cardiology to start digoxin for atrial fibrillation  Nephrology agrees with current diuretic plan  SUBJECTIVE     Patient seen and examined  No acute events overnight  Patient was sitting up comfortably in his chair  Patient still endorses improved breathing since his surgery on 06/02  Denies fevers, chills, night sweats, dysphagia, abdominal pain, nausea, vomiting, diarrhea, constipation, dysuria  Patient still with chronic peripheral edema    Patient still with heart rate jumping into the 110s to 120s, but more controlled than yesterday  His anemia and chest tubes likely contributing to his uncontrolled atrial fibrillation  Cardiology following, will start digoxin  CT surgery following, plan to put his chest tube to water-seal tomorrow  May restart anticoagulation if hemoglobin remains stable without transfusions for the next 24 hours  Nephrology following, will continue with diuresis  OBJECTIVE     Vitals:    21 0600 21 0701 21 0800 21 1048   BP:  113/81  95/54   BP Location:       Pulse:       Resp:  18  17   Temp:  98 1 °F (36 7 °C)  98 4 °F (36 9 °C)   TempSrc:       SpO2:   99%    Weight: 132 kg (289 lb 14 5 oz)         Temperature:   Temp (24hrs), Av 4 °F (36 9 °C), Min:97 4 °F (36 3 °C), Max:98 7 °F (37 1 °C)    Temperature: 98 4 °F (36 9 °C)  Intake & Output:  I/O       701 -  07 -  07 07 -  0700    P  O  0 720     I V  (mL/kg) 2691 7 (22 2)      Blood  350     IV Piggyback 300      Total Intake(mL/kg) 2991 7 (24 7) 1070 (8 2)     Urine (mL/kg/hr) 1800 (0 6) 1050 (0 3)     Blood 800      Chest Tube 714 453     Total Output 3314 1503     Net -322 3 -433                Weights:        Body mass index is 40 43 kg/m²  Weight (last 2 days)     Date/Time   Weight    21 0600   132 (289 91)    21 0600   121 (266 32)    21 0544   130 (287 48)            Physical Exam  Vitals signs and nursing note reviewed  Constitutional:       General: He is not in acute distress  Appearance: He is obese  HENT:      Head: Normocephalic and atraumatic  Right Ear: External ear normal       Left Ear: External ear normal       Nose: Nose normal       Mouth/Throat:      Mouth: Mucous membranes are moist       Pharynx: Oropharynx is clear  Eyes:      Pupils: Pupils are equal, round, and reactive to light  Cardiovascular:      Rate and Rhythm: Tachycardia present   Rhythm irregular  Heart sounds: No murmur  Pulmonary:      Effort: Pulmonary effort is normal       Comments: Rhonchorous breath sounds on the right, clear on the left  Abdominal:      General: Bowel sounds are normal  There is no distension  Palpations: Abdomen is soft  Tenderness: There is no abdominal tenderness  Musculoskeletal:      Right lower leg: Edema (2+, chronic) present  Left lower leg: Edema (2+, chronic) present  Comments: Two chest tubes on the right   Skin:     Capillary Refill: Capillary refill takes less than 2 seconds  Comments: Chronic venous stasis changes bilateral lower extremities   Neurological:      General: No focal deficit present  Mental Status: He is alert  Psychiatric:         Mood and Affect: Mood normal          Behavior: Behavior normal        LABORATORY DATA     Labs: I have personally reviewed pertinent reports  Results from last 7 days   Lab Units 06/04/21 0438 06/03/21 0443 06/1959 06/02/21  0440   WBC Thousand/uL 8 17 7 81 7 26   < > 5 56   HEMOGLOBIN g/dL 7 8* 7 2* 7 4*   < > 7 5*   I STAT HEMOGLOBIN   --   --   --    < >  --    HEMATOCRIT % 25 6* 23 6* 23 8*   < > 24 0*   HEMATOCRIT, ISTAT   --   --   --    < >  --    PLATELETS Thousands/uL 217 192 170   < > 173   NEUTROS PCT % 75 90*  --   --  82*   MONOS PCT % 11 5  --   --  10    < > = values in this interval not displayed        Results from last 7 days   Lab Units 06/04/21 0438 06/03/21 0443 06/1960 06/02/21  1615 06/02/21  1535 06/02/21  1422  06/02/21  0440   POTASSIUM mmol/L 4 1 4 6 4 3  --   --   --   --  4 0   CHLORIDE mmol/L 102 102 102  --   --   --   --  98*   CO2 mmol/L 30 29 29  --   --   --   --  30   CO2, I-STAT mmol/L  --   --   --  29 30 31   < >  --    BUN mg/dL 40* 33* 32*  --   --   --   --  34*   CREATININE mg/dL 1 58* 1 46* 1 24  --   --   --   --  1 34*   CALCIUM mg/dL 9 0 8 6 9 0  --   --   --   --  9 3   ALK PHOS U/L 161* 142*  --   --   --   -- --  189*   ALT U/L 13 17  --   --   --   --   --  18   AST U/L 17 18  --   --   --   --   --  18   GLUCOSE, ISTAT mg/dl  --   --   --  119 108 107  --   --     < > = values in this interval not displayed  Results from last 7 days   Lab Units 06/1959 06/01/21  0511 05/29/21  0444   MAGNESIUM mg/dL 2 4 2 4 2 7*          Results from last 7 days   Lab Units 06/02/21  0440   INR  1 18   PTT seconds 40*               IMAGING & DIAGNOSTIC TESTING     Radiology Results: I have personally reviewed pertinent reports  Xr Wrist 2 Vw Right    Result Date: 5/31/2021  Impression: No acute osseous abnormality  Workstation performed: [id number]     Ct Chest Wo Contrast    Result Date: 5/30/2021  Impression: Pigtail catheter in right posterior costophrenic sulcus with small loculated right hydropneumothorax, possibly ex vacuo  Masslike opacities in the right middle and right lower lobe due to benign rounded atelectasis  Healed/healing fractures of the anterior right 3rd through 7th ribs, visible on 5/26/2021, new since 2/22/2021  Correlate with recent trauma  Pulmonary artery enlargement which can be seen with pulmonary hypertension  Workstation performed: [id number]     Ct Chest Wo Contrast    Result Date: 5/26/2021  Impression: Improvement in the prior pleural effusion with new hyperdensities consistent with hematomas with pigtail catheter in place  Atelectasis of the middle lobe and right lower lobe unchanged  Workstation performed: [id number]     Ir Chest Tube Check/change/reposition/upsize    Result Date: 5/27/2021  Impression: Impression: 1  Successful placement of a 12 Indonesian right chest tube under ultrasound and fluoroscopic guidance  Workstation performed: [id number]     Other Diagnostic Testing: I have personally reviewed pertinent reports      ACTIVE MEDICATIONS     Current Facility-Administered Medications   Medication Dose Route Frequency    acetaminophen (TYLENOL) tablet 650 mg  650 mg Oral Q6H [facility]    bumetanide (BUMEX) injection 1 mg  1 mg Intravenous BID    calcium carbonate (TUMS) chewable tablet 1,000 mg  1,000 mg Oral Daily PRN    carvedilol (COREG) tablet 12 5 mg  12 5 mg Oral BID With Meals    digoxin (LANOXIN) injection 250 mcg  250 mcg Intravenous Q8H    ergocalciferol (VITAMIN D2) capsule 50,000 Units  50,000 Units Oral Weekly    gabapentin (NEURONTIN) capsule 300 mg  300 mg Oral TID    HYDROmorphone HCl (DILAUDID) injection 0 2 mg  0 2 mg Intravenous Q4H PRN    insulin lispro (HumaLOG) 100 units/mL subcutaneous injection 1-6 Units  1-6 Units Subcutaneous TID AC    lidocaine (LMX) 4 % cream   Topical Daily PRN    menthol-methyl salicylate (BENGAY) 06-21 % cream   Apply externally 4x Daily PRN    metoprolol (LOPRESSOR) injection 2 5 mg  2 5 mg Intravenous Q6H PRN    midodrine (PROAMATINE) tablet 10 mg  10 mg Oral TID AC    nicotine (NICODERM CQ) 14 mg/24hr TD 24 hr patch 1 patch  1 patch Transdermal Daily    ondansetron (ZOFRAN) injection 4 mg  4 mg Intravenous Q6H PRN    oxyCODONE (ROXICODONE) IR tablet 2 5 mg  2 5 mg Oral Q4H PRN    oxyCODONE (ROXICODONE) IR tablet 5 mg  5 mg Oral Q4H PRN    polyethylene glycol (MIRALAX) packet 17 g  17 g Oral Daily PRN    senna-docusate sodium (SENOKOT S) 8 6-50 mg per tablet 1 tablet  1 tablet Oral HS       VTE Pharmacologic Prophylaxis: Reason for no pharmacologic prophylaxis Anemia  VTE Mechanical Prophylaxis: sequential compression device    Portions of the record may have been created with voice recognition software  Occasional wrong word or "sound a like" substitutions may have occurred due to the inherent limitations of voice recognition software    Read the chart carefully and recognize, using context, where substitutions have occurred   ==  Leslie Dickinson, [facility]  Internal Medicine Residency PGY-1

## 2021-06-04 NOTE — QUICK NOTE
Patient's family called and updated  All questions answered      Faye Rangel DO, [address]  PGY-1, Internal Medicine  [facility]

## 2021-06-05 NOTE — PROGRESS NOTES
NEPHROLOGY PROGRESS NOTE   Ele Garcia 57 y o  male MRN: [id number]  Unit/Bed#: [facility] 422-01 Encounter: [id number]  Reason for Consult:  Acute kidney injury    ASSESSMENT/PLAN:  1  Acute kidney injury, likely multifactorial, possible component of ATN given impaired autoregulation in the face of hypotension  2  CKD stage 3 baseline creatinine 1 5-1 8  3  Hyponatremia, secondary to volume overload, improved after [place] x1, Bumex resumed  4  Cardiomyopathy, ejection fraction 60% with likely diastolic dysfunction  5  Right pleural effusions status post chest tube placement, post op VATS and right lung decortication  6  Chronic hypotension, continue with midodrine 3 times daily     7  Suspicion for right wrist gout, uric acid 9 0, okay to use colchicine once a day    PLAN:  · Overall renal function remains stable  · Diuretics as per Cardiology  · No other changes in current regimen    SUBJECTIVE:  Seen examined  Patient awake alert  Overall is feeling fairly well  Does complain of right wrist discomfort  Review of Systems    OBJECTIVE:  Current Weight: Weight - Scale: 132 kg (291 lb 14 2 oz)  Vitals:    06/05/21 0244 06/05/21 0600 06/05/21 0706 06/05/21 0706   BP: 99/64  113/70 113/70   BP Location: Left arm      Pulse: 77  95 105   Resp: 16      Temp: 98 1 °F (36 7 °C)  98 °F (36 7 °C) 98 °F (36 7 °C)   TempSrc: Oral      SpO2: 97%  96% 97%   Weight:  132 kg (291 lb 14 2 oz)         Intake/Output Summary (Last 24 hours) at 6/5/2021 0948  Last data filed at 6/5/2021 5714  Gross per 24 hour   Intake 940 ml   Output 2070 ml   Net -1130 ml       Physical Exam  Constitutional:       Appearance: He is obese  HENT:      Head: Normocephalic and atraumatic  Eyes:      General: No scleral icterus  Cardiovascular:      Rate and Rhythm: Normal rate  Rhythm irregular  Pulmonary:      Effort: Pulmonary effort is normal       Breath sounds: Examination of the right-lower field reveals decreased breath sounds  Decreased breath sounds present  Abdominal:      General: There is distension  Palpations: Abdomen is soft  Tenderness: There is no abdominal tenderness  Musculoskeletal:      Right lower leg: Edema present  Left lower leg: Edema present  Skin:     General: Skin is warm and dry  Findings: Rash (Stasis) present  Neurological:      Mental Status: He is alert and oriented to person, place, and time           Medications:    Current Facility-Administered Medications:     acetaminophen (TYLENOL) tablet 650 mg, 650 mg, Oral, Q6H [facility], Delores Estevez PA-C, 650 mg at 06/05/21 2253    bisacodyl (DULCOLAX) EC tablet 10 mg, 10 mg, Oral, Daily, Roosevelt Felix DO, 10 mg at 06/05/21 0947    bumetanide (BUMEX) injection 1 mg, 1 mg, Intravenous, BID, Gina Waldrop MD, 1 mg at 06/05/21 0827    calcium carbonate (TUMS) chewable tablet 1,000 mg, 1,000 mg, Oral, Daily PRN, Naty Davenport PA-C    carvedilol (COREG) tablet 12 5 mg, 12 5 mg, Oral, BID With Meals, Satya Jurado DO, 12 5 mg at 06/05/21 0827    ergocalciferol (VITAMIN D2) capsule 50,000 Units, 50,000 Units, Oral, Weekly, Delores Estevez PA-C, 50,000 Units at 06/05/21 0827    gabapentin (NEURONTIN) capsule 300 mg, 300 mg, Oral, TID, Naty Davenport PA-C, 300 mg at 06/05/21 0827    HYDROmorphone HCl (DILAUDID) injection 0 2 mg, 0 2 mg, Intravenous, Q4H PRN, Delores Estevez PA-C, 0 2 mg at 05/31/21 0654    insulin lispro (HumaLOG) 100 units/mL subcutaneous injection 1-6 Units, 1-6 Units, Subcutaneous, TID AC, 1 Units at 06/03/21 1039 **AND** Fingerstick Glucose (POCT), , , TID AC, Getachew Toribio DO    lidocaine (LMX) 4 % cream, , Topical, Daily PRN, Delores Estevez PA-C, Given at 06/02/21 0548    menthol-methyl salicylate (BENGAY) 15-46 % cream, , Apply externally, 4x Daily PRN, Delores Estevez PA-C, Given at 05/30/21 2048    metoprolol (LOPRESSOR) injection 2 5 mg, 2 5 mg, Intravenous, Q6H PRN, Fiedl Davenport PA-C, 2 5 mg at 06/04/21 0828    midodrine (PROAMATINE) tablet 10 mg, 10 mg, Oral, TID AC, Naty Davenport PA-C, 10 mg at 06/05/21 9543    nicotine (NICODERM CQ) 14 mg/24hr TD 24 hr patch 1 patch, 1 patch, Transdermal, Daily, Tracy Roa PA-C, 1 patch at 06/05/21 0947    ondansetron (ZOFRAN) injection 4 mg, 4 mg, Intravenous, Q6H PRN, Naty Davenport PA-C    oxyCODONE (ROXICODONE) IR tablet 2 5 mg, 2 5 mg, Oral, Q4H PRN, Naty Davenport PA-C    oxyCODONE (ROXICODONE) IR tablet 5 mg, 5 mg, Oral, Q4H PRN, Tracy Roa PA-C, 5 mg at 06/05/21 5020    polyethylene glycol (MIRALAX) packet 17 g, 17 g, Oral, Daily, Roosevelt Felix, DO, 17 g at 06/05/21 0947    senna-docusate sodium (SENOKOT S) 8 6-50 mg per tablet 1 tablet, 1 tablet, Oral, HS, Tracy Roa PA-C, 1 tablet at 06/04/21 2156    Laboratory Results:  Results from last 7 days   Lab Units 06/05/21  0526 06/04/21  0438 06/03/21  0443 06/02/21  1959/21  1723 06/02/21  1615 06/02/21  1535 06/02/21  1422  06/02/21  0440 06/01/21  0511  05/31/21  0503   WBC Thousand/uL 6 41 8 17 7 81 7 26 5 50  --   --   --   --  5 56 5 28  --  5 78   HEMOGLOBIN g/dL 7 5* 7 8* 7 2* 7 4* 7 2*  --   --   --   --  7 5* 8 0*   < > 7 7*   I STAT HEMOGLOBIN g/dl  --   --   --   --   --  7 5* 7 8* 7 8*   < >  --   --   --   --    HEMATOCRIT % 24 6* 25 6* 23 6* 23 8* 23 3*  --   --   --   --  24 0* 25 5*   < > 24 5*   HEMATOCRIT, ISTAT %  --   --   --   --   --  22* 23* 23*   < >  --   --   --   --    PLATELETS Thousands/uL 201 217 192 170 168  --   --   --   --  173 190  --  187   POTASSIUM mmol/L 4 5 4 1 4 6 4 3  --   --   --   --   --  4 0 3 8  --  4 0   CHLORIDE mmol/L 103 102 102 102  --   --   --   --   --  98* 95*  --  98*   CO2 mmol/L 30 30 29 29  --   --   --   --   --  30 31  --  30   CO2, I-STAT mmol/L  --   --   --   --   --  29 30 31  --   --   --   --   --    BUN mg/dL 37* 40* 33* 32*  --   --   --   --   --  34* 36*  --  40*   CREATININE mg/dL 1 39* 1 58* 1 46* 1 24  -- --   --   --   --  1 34* 1 44*  --  1 47*   CALCIUM mg/dL 8 7 9 0 8 6 9 0  --   --   --   --   --  9 3 9 4  --  9 0   MAGNESIUM mg/dL  --   --   --  2 4  --   --   --   --   --   --  2 4  --   --    GLUCOSE, ISTAT mg/dl  --   --   --   --   --  119 108 107  --   --   --   --   --     < > = values in this interval not displayed

## 2021-06-05 NOTE — PROGRESS NOTES
Heart Failure/ Pulmonary Hypertension Progress Note - Marta Goldstein 57 y o  male MRN: [id number]    Unit/Bed#: [facility] 422-01 Encounter: [id number]      Assessment:    Principal Problem:    Pleural effusion on right  Active Problems:    Atrial fibrillation (HCC)    Chronic diastolic (congestive) heart failure (HCC)    Type 2 diabetes mellitus with diabetic chronic kidney disease (HCC)    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 ascites (HCC)    CKD (chronic kidney disease) stage 3, GFR 30-59 ml/min (HCC)    Chronic anemia    Acute kidney injury superimposed on CKD (HCC)    Hypotension      Objective: Intake/ Output: 1120/1320 + 300 CT: -500  Weight: 291 lbs  Tele:     # Recurrent right pleural effusion s/p VATS decortication  ? Secondary to cirrhosis    # anemia- s/p 1 u pRBCs    # persistent afib   AC:   Rate: coreg 12 5 mg BID, digoxin 250 mcg Q8 load    # Chronic HFpEF  Diuretic: bumex 1 mg IV BID  Weight:  NT proBNP    # DM  # MARISABEL on CKD, baseline Cr 1 5- 1 8  # chronic hypotension, on midodrine 10 m gQ8    Plan:  Coreg reduces BP more than Toprol and pt on midodrine, would change coreg to Toprol XL 25 mg BID and titrate up    Review of Systems   Constitutional: Negative for activity change, appetite change, fatigue and unexpected weight change  HENT: Negative for congestion and nosebleeds  Eyes: Negative  Respiratory: Negative for cough, chest tightness and shortness of breath  Cardiovascular: Negative for chest pain, palpitations and leg swelling  Gastrointestinal: Negative for abdominal distention  Endocrine: Negative  Genitourinary: Negative  Musculoskeletal: Negative  Skin: Negative  Neurological: Negative for dizziness, syncope and weakness  Hematological: Negative  Psychiatric/Behavioral: Negative  [employer] (day, reason): Gann catheter (day, reason):    Vitals: Blood pressure 113/70, pulse 105, temperature 98 °F (36 7 °C), resp   rate 16, weight 132 kg (291 lb 14 2 oz), SpO2 97 % , Body mass index is 40 71 kg/m² , I/O last 3 completed shifts: In: 2954 [P O :1120; Blood:350]  Out: 2363 [Urine:1870; Chest Tube:493]  I/O this shift:  In: 120 [P O :120]  Out: 450 [Urine:450]  Wt Readings from Last 3 Encounters:   06/05/21 132 kg (291 lb 14 2 oz)   05/26/21 131 kg (288 lb 12 8 oz)   05/22/21 (!) 136 kg (300 lb 0 7 oz)       Intake/Output Summary (Last 24 hours) at 6/5/2021 1035  Last data filed at 6/5/2021 0937  Gross per 24 hour   Intake 940 ml   Output 2070 ml   Net -1130 ml     I/O last 3 completed shifts: In: 6628 [P O :1120; Blood:350]  Out: 2363 [Urine:1870; Chest Tube:493]    No significant arrhythmias seen on telemetry review         Physical Exam:  Vitals:    06/05/21 0244 06/05/21 0600 06/05/21 0706 06/05/21 0706   BP: 99/64  113/70 113/70   BP Location: Left arm      Pulse: 77  95 105   Resp: 16      Temp: 98 1 °F (36 7 °C)  98 °F (36 7 °C) 98 °F (36 7 °C)   TempSrc: Oral      SpO2: 97%  96% 97%   Weight:  132 kg (291 lb 14 2 oz)         GEN: Blayne Monte appears well, alert and oriented x 3, pleasant and cooperative   HEENT: pupils equal, round, and reactive to light; extraocular muscles intact  NECK: supple, no carotid bruits   HEART: regular rhythm, normal S1 and S2, no murmurs, clicks, gallops or rubs, JVP is    LUNGS: clear to auscultation bilaterally; no wheezes, rales, or rhonchi   ABDOMEN: normal bowel sounds, soft, no tenderness, no distention  EXTREMITIES: peripheral pulses normal; no clubbing, cyanosis, or edema  NEURO: no focal findings   SKIN: normal without suspicious lesions on exposed skin      Current Facility-Administered Medications:     acetaminophen (TYLENOL) tablet 650 mg, 650 mg, Oral, Q6H Naty GARRETT PA-C, 650 mg at 06/05/21 9977    bisacodyl (DULCOLAX) EC tablet 10 mg, 10 mg, Oral, Daily, Roosevelt Felix DO, 10 mg at 06/05/21 0913    bumetanide (BUMEX) injection 1 mg, 1 mg, Intravenous, BID, Shonda Kendall MD, 1 mg at 06/05/21 6623   calcium carbonate (TUMS) chewable tablet 1,000 mg, 1,000 mg, Oral, Daily PRN, Naty Davenport PA-C    carvedilol (COREG) tablet 12 5 mg, 12 5 mg, Oral, BID With Meals, Salma Gonzales DO, 12 5 mg at 06/05/21 0827    ergocalciferol (VITAMIN D2) capsule 50,000 Units, 50,000 Units, Oral, Weekly, Bev Li PA-C, 50,000 Units at 06/05/21 0827    gabapentin (NEURONTIN) capsule 300 mg, 300 mg, Oral, TID, Naty Davenport PA-C, 300 mg at 06/05/21 0827    HYDROmorphone HCl (DILAUDID) injection 0 2 mg, 0 2 mg, Intravenous, Q4H PRN, Bev Li PA-C, 0 2 mg at 05/31/21 0654    insulin lispro (HumaLOG) 100 units/mL subcutaneous injection 1-6 Units, 1-6 Units, Subcutaneous, TID AC, 1 Units at 06/03/21 1039 **AND** Fingerstick Glucose (POCT), , , TID AC, Getachew Toribio DO    lidocaine (LMX) 4 % cream, , Topical, Daily PRN, Bev Li PA-C, Given at 06/02/21 0548    menthol-methyl salicylate (BENGAY) 19-12 % cream, , Apply externally, 4x Daily PRN, Bev Li PA-C, Given at 05/30/21 2048    metoprolol (LOPRESSOR) injection 2 5 mg, 2 5 mg, Intravenous, Q6H PRN, Bev Li PA-C, 2 5 mg at 06/04/21 0828    midodrine (PROAMATINE) tablet 10 mg, 10 mg, Oral, TID AC, Bev Li PA-C, 10 mg at 06/05/21 8128    nicotine (NICODERM CQ) 14 mg/24hr TD 24 hr patch 1 patch, 1 patch, Transdermal, Daily, Bev Li PA-C, 1 patch at 06/05/21 0947    ondansetron (ZOFRAN) injection 4 mg, 4 mg, Intravenous, Q6H PRN, Naty Davenport PA-C    oxyCODONE (ROXICODONE) IR tablet 2 5 mg, 2 5 mg, Oral, Q4H PRN, SANDRA Villavicencio-PADMA    oxyCODONE (ROXICODONE) IR tablet 5 mg, 5 mg, Oral, Q4H PRN, Bev Li PA-C, 5 mg at 06/05/21 4656    polyethylene glycol (MIRALAX) packet 17 g, 17 g, Oral, Daily, Roosevelt Felix, DO, 17 g at 06/05/21 0947    senna-docusate sodium (SENOKOT S) 8 6-50 mg per tablet 1 tablet, 1 tablet, Oral, HS, SANDRA Hamm-C, 1 tablet at 06/04/21 8596      Labs & Results: Results from last 7 days   Lab Units 06/05/21  0526 06/04/21  0438 06/03/21  0443   WBC Thousand/uL 6 41 8 17 7 81   HEMOGLOBIN g/dL 7 5* 7 8* 7 2*   HEMATOCRIT % 24 6* 25 6* 23 6*   PLATELETS Thousands/uL 201 217 192         Results from last 7 days   Lab Units 06/05/21  0526 06/04/21  0438 06/03/21  0443  06/02/21  1615 06/02/21  1535 06/02/21  1422   POTASSIUM mmol/L 4 5 4 1 4 6   < >  --   --   --    CHLORIDE mmol/L 103 102 102   < >  --   --   --    CO2 mmol/L 30 30 29   < >  --   --   --    CO2, I-STAT mmol/L  --   --   --   --  29 30 31   BUN mg/dL 37* 40* 33*   < >  --   --   --    CREATININE mg/dL 1 39* 1 58* 1 46*   < >  --   --   --    CALCIUM mg/dL 8 7 9 0 8 6   < >  --   --   --    ALK PHOS U/L 174* 161* 142*  --   --   --   --    ALT U/L 9* 13 17  --   --   --   --    AST U/L 15 17 18  --   --   --   --    GLUCOSE, ISTAT mg/dl  --   --   --   --  119 108 107    < > = values in this interval not displayed  Results from last 7 days   Lab Units 06/02/21  0440   INR  1 18         Counseling / Coordination of Care  Total floor / unit time spent today 25 minutes  Greater than 50% of total time was spent with the patient and / or family counseling and / or coordination of care  A description of the counseling / coordination of care: 15    Thank you for the opportunity to participate in the care of this patient    [address] [facility] PULMONARY HYPERTENSION  MEDICAL DIRECTOR OF [place]

## 2021-06-05 NOTE — PLAN OF CARE
Problem: Potential for Falls  Goal: Patient will remain free of falls  Description: INTERVENTIONS:  - Assess patient frequently for physical needs  -  Identify cognitive and physical deficits and behaviors that affect risk of falls    -  [place] fall precautions as indicated by assessment   - Educate patient/family on patient safety including physical limitations  - Instruct patient to call for assistance with activity based on assessment  - Modify environment to reduce risk of injury  - Consider OT/PT consult to assist with strengthening/mobility  Outcome: Progressing     Problem: PAIN - ADULT  Goal: Verbalizes/displays adequate comfort level or baseline comfort level  Description: Interventions:  - Encourage patient to monitor pain and request assistance  - Assess pain using appropriate pain scale  - Administer analgesics based on type and severity of pain and evaluate response  - Implement non-pharmacological measures as appropriate and evaluate response  - Consider cultural and social influences on pain and pain management  - Notify physician/advanced practitioner if interventions unsuccessful or patient reports new pain  Outcome: Progressing     Problem: INFECTION - ADULT  Goal: Absence or prevention of progression during hospitalization  Description: INTERVENTIONS:  - Assess and monitor for signs and symptoms of infection  - Monitor lab/diagnostic results  - Monitor all insertion sites, i e  indwelling lines, tubes, and drains  - Monitor endotracheal if appropriate and nasal secretions for changes in amount and color  - [place] appropriate cooling/warming therapies per order  - Administer medications as ordered  - Instruct and encourage patient and family to use good hand hygiene technique  - Identify and instruct in appropriate isolation precautions for identified infection/condition  Outcome: Progressing  Goal: Absence of fever/infection during neutropenic period  Description: INTERVENTIONS:  - Monitor WBC    Outcome: Progressing     Problem: SAFETY ADULT  Goal: Patient will remain free of falls  Description: INTERVENTIONS:  - Assess patient frequently for physical needs  -  Identify cognitive and physical deficits and behaviors that affect risk of falls    -  [place] fall precautions as indicated by assessment   - Educate patient/family on patient safety including physical limitations  - Instruct patient to call for assistance with activity based on assessment  - Modify environment to reduce risk of injury  - Consider OT/PT consult to assist with strengthening/mobility  Outcome: Progressing  Goal: Maintain or return to baseline ADL function  Description: INTERVENTIONS:  -  Assess patient's ability to carry out ADLs; assess patient's baseline for ADL function and identify physical deficits which impact ability to perform ADLs (bathing, care of mouth/teeth, toileting, grooming, dressing, etc )  - Assess/evaluate cause of self-care deficits   - Assess range of motion  - Assess patient's mobility; develop plan if impaired  - Assess patient's need for assistive devices and provide as appropriate  - Encourage maximum independence but intervene and supervise when necessary  - Involve family in performance of ADLs  - Assess for home care needs following discharge   - Consider OT consult to assist with ADL evaluation and planning for discharge  - Provide patient education as appropriate  Outcome: Progressing  Goal: Maintain or return mobility status to optimal level  Description: INTERVENTIONS:  - Assess patient's baseline mobility status (ambulation, transfers, stairs, etc )    - Identify cognitive and physical deficits and behaviors that affect mobility  - Identify mobility aids required to assist with transfers and/or ambulation (gait belt, sit-to-stand, lift, walker, cane, etc )  - [place] fall precautions as indicated by assessment  - Record patient progress and toleration of activity level on Mobility SBAR; progress patient to next Phase/Stage  - Instruct patient to call for assistance with activity based on assessment  - Consider rehabilitation consult to assist with strengthening/weightbearing, etc   Outcome: Progressing     Problem: DISCHARGE PLANNING  Goal: Discharge to home or other facility with appropriate resources  Description: INTERVENTIONS:  - Identify barriers to discharge w/patient and caregiver  - Arrange for needed discharge resources and transportation as appropriate  - Identify discharge learning needs (meds, wound care, etc )  - Arrange for interpretive services to assist at discharge as needed  - Refer to Case Management Department for coordinating discharge planning if the patient needs post-hospital services based on physician/advanced practitioner order or complex needs related to functional status, cognitive ability, or social support system  Outcome: Progressing     Problem: Knowledge Deficit  Goal: Patient/family/caregiver demonstrates understanding of disease process, treatment plan, medications, and discharge instructions  Description: Complete learning assessment and assess knowledge base    Interventions:  - Provide teaching at level of understanding  - Provide teaching via preferred learning methods  Outcome: Progressing     Problem: Prexisting or High Potential for Compromised Skin Integrity  Goal: Skin integrity is maintained or improved  Description: INTERVENTIONS:  - Identify patients at risk for skin breakdown  - Assess and monitor skin integrity  - Assess and monitor nutrition and hydration status  - Monitor labs   - Assess for incontinence   - Turn and reposition patient  - Assist with mobility/ambulation  - Relieve pressure over bony prominences  - Avoid friction and shearing  - Provide appropriate hygiene as needed including keeping skin clean and dry  - Evaluate need for skin moisturizer/barrier cream  - Collaborate with interdisciplinary team   - Patient/family teaching  - Consider wound care consult   Outcome: Progressing     Problem: CARDIOVASCULAR - ADULT  Goal: Maintains optimal cardiac output and hemodynamic stability  Description: INTERVENTIONS:  - Monitor I/O, vital signs and rhythm  - Monitor for S/S and trends of decreased cardiac output  - Administer and titrate ordered vasoactive medications to optimize hemodynamic stability  - Assess quality of pulses, skin color and temperature  - Assess for signs of decreased coronary artery perfusion  - Instruct patient to report change in severity of symptoms  Outcome: Progressing  Goal: Absence of cardiac dysrhythmias or at baseline rhythm  Description: INTERVENTIONS:  - Continuous cardiac monitoring, vital signs, obtain 12 lead EKG if ordered  - Administer antiarrhythmic and heart rate control medications as ordered  - Monitor electrolytes and administer replacement therapy as ordered  Outcome: Progressing     Problem: RESPIRATORY - ADULT  Goal: Achieves optimal ventilation and oxygenation  Description: INTERVENTIONS:  - Assess for changes in respiratory status  - Assess for changes in mentation and behavior  - Position to facilitate oxygenation and minimize respiratory effort  - Oxygen administered by appropriate delivery if ordered  - Initiate smoking cessation education as indicated  - Encourage broncho-pulmonary hygiene including cough, deep breathe, Incentive Spirometry  - Assess the need for suctioning and aspirate as needed  - Assess and instruct to report SOB or any respiratory difficulty  - Respiratory Therapy support as indicated  Outcome: Progressing     Problem: GASTROINTESTINAL - ADULT  Goal: Minimal or absence of nausea and/or vomiting  Description: INTERVENTIONS:  - Administer IV fluids if ordered to ensure adequate hydration  - Maintain NPO status until nausea and vomiting are resolved  - Nasogastric tube if ordered  - Administer ordered antiemetic medications as needed  - Provide nonpharmacologic comfort measures as appropriate  - Advance diet as tolerated, if ordered  - Consider nutrition services referral to assist patient with adequate nutrition and appropriate food choices  Outcome: Progressing  Goal: Maintains or returns to baseline bowel function  Description: INTERVENTIONS:  - Assess bowel function  - Encourage oral fluids to ensure adequate hydration  - Administer IV fluids if ordered to ensure adequate hydration  - Administer ordered medications as needed  - Encourage mobilization and activity  - Consider nutritional services referral to assist patient with adequate nutrition and appropriate food choices  Outcome: Progressing  Goal: Maintains adequate nutritional intake  Description: INTERVENTIONS:  - Monitor percentage of each meal consumed  - Identify factors contributing to decreased intake, treat as appropriate  - Assist with meals as needed  - Monitor I&O, weight, and lab values if indicated  - Obtain nutrition services referral as needed  Outcome: Progressing     Problem: METABOLIC, FLUID AND ELECTROLYTES - ADULT  Goal: Electrolytes maintained within normal limits  Description: INTERVENTIONS:  - Monitor labs and assess patient for signs and symptoms of electrolyte imbalances  - Administer electrolyte replacement as ordered  - Monitor response to electrolyte replacements, including repeat lab results as appropriate  - Instruct patient on fluid and nutrition as appropriate  Outcome: Progressing  Goal: Fluid balance maintained  Description: INTERVENTIONS:  - Monitor labs   - Monitor I/O and WT  - Instruct patient on fluid and nutrition as appropriate  - Assess for signs & symptoms of volume excess or deficit  Outcome: Progressing     Problem: SKIN/TISSUE INTEGRITY - ADULT  Goal: Skin integrity remains intact  Description: INTERVENTIONS  - Identify patients at risk for skin breakdown  - Assess and monitor skin integrity  - Assess and monitor nutrition and hydration status  - Monitor labs (i e  albumin)  - Assess for incontinence   - Turn and reposition patient  - Assist with mobility/ambulation  - Relieve pressure over bony prominences  - Avoid friction and shearing  - Provide appropriate hygiene as needed including keeping skin clean and dry  - Evaluate need for skin moisturizer/barrier cream  - Collaborate with interdisciplinary team (i e  Nutrition, Rehabilitation, etc )   - Patient/family teaching  Outcome: Progressing  Goal: Incision(s), wounds(s) or drain site(s) healing without S/S of infection  Description: INTERVENTIONS  - Assess and document risk factors for skin impairment   - Assess and document dressing, incision, wound bed, drain sites and surrounding tissue  - Consider nutrition services referral as needed  - Oral mucous membranes remain intact  - Provide patient/ family education  Outcome: Progressing  Goal: Oral mucous membranes remain intact  Description: INTERVENTIONS  - Assess oral mucosa and hygiene practices  - Implement preventative oral hygiene regimen  - Implement oral medicated treatments as ordered  - Initiate Nutrition services referral as needed  Outcome: Progressing     Problem: HEMATOLOGIC - ADULT  Goal: Maintains hematologic stability  Description: INTERVENTIONS  - Assess for signs and symptoms of bleeding or hemorrhage  - Monitor labs  - Administer supportive blood products/factors as ordered and appropriate  Outcome: Progressing     Problem: MUSCULOSKELETAL - ADULT  Goal: Maintain or return mobility to safest level of function  Description: INTERVENTIONS:  - Assess patient's ability to carry out ADLs; assess patient's baseline for ADL function and identify physical deficits which impact ability to perform ADLs (bathing, care of mouth/teeth, toileting, grooming, dressing, etc )  - Assess/evaluate cause of self-care deficits   - Assess range of motion  - Assess patient's mobility  - Assess patient's need for assistive devices and provide as appropriate  - Encourage maximum independence but intervene and supervise when necessary  - Involve family in performance of ADLs  - Assess for home care needs following discharge   - Consider OT consult to assist with ADL evaluation and planning for discharge  - Provide patient education as appropriate  Outcome: Progressing  Goal: Maintain proper alignment of affected body part  Description: INTERVENTIONS:  - Support, maintain and protect limb and body alignment  - Provide patient/ family with appropriate education  Outcome: Progressing

## 2021-06-05 NOTE — PROGRESS NOTES
INTERNAL MEDICINE RESIDENCY PROGRESS NOTE     Name: Rima Connolly   Age & Sex: 57 y o  male   MRN: [id number]  Unit/Bed#: [address] 422-01   Encounter: [id number]  Team: SOD Team C     PATIENT INFORMATION     Name: Rima Connolly   Age & Sex: 57 y o  male   MRN: [id number]  Hospital Stay Days: 10    ASSESSMENT/PLAN     Principal Problem:    Pleural effusion on right  Active Problems:    Acute kidney injury superimposed on CKD [facility])    Atrial fibrillation (HCC)    Chronic diastolic (congestive) heart failure (HCC)    Type 2 diabetes mellitus with diabetic chronic kidney disease (HCC)    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 ascites (HCC)    CKD (chronic kidney disease) stage 3, GFR 30-59 ml/min (HCC)    Chronic anemia    Hypotension      * Pleural effusion on right  Assessment & Plan  Patient transferred to [place] from [facility] for thoracic surgery evaluation  History of recurrent right-sided pleural effusion s/p thoracentesis on 05/22, chest tube placed with continued output of bloody fluid  Fluid-exudative but negative for malignancy, cultures negative      Plan:  · Repeat chest x-ray on 05/24 with continued effusion  · CT scan performed on 05/26 revealed pleural effusion improvement with new hyperdensities consistent with lung hematomas  · Consulted thoracic surgery for evaluation  · Management per thoracic surgery  · S/p tpa/DNAse on 5/29 with good output  · CT of the chest on 05/30 revealed a small loculated right hydropneumothorax and benign rounded atelectasis  · Patient is now status post VATS with decortication on the right on 06/02, follow thoracic surgery recommendations:  Continue on suction, water-seal today, will continue to hold AC until hemoglobin proved stable  · Cont monitoring the output  · IS, pulmonary toilet  · Pain control  · Bowel regimen in place, altered per patient's recommendations as to what helps him at home    Acute kidney injury superimposed on CKD [facility])  Assessment & Plan  Likely secondary to episodes of hypotension with anemia vs cardiorenal     Plan:  · Nephrology following, appreciate reocmmendations  · Baseline Cr 1 5-1 8, admitted with Cr of 2 8 on 5/21/2021  · Cr improved to baseline, now 1 39  · Patient continued on midodrine  · Continue holding home med Lisinopril 5mg for now  · Ultimately will require outpatient follow up with pt's nephrologist Dr Umer Park    Atrial fibrillation [facility])  Assessment & Plan  HR elevated due to resp status  History of Afib  Plan:  · On Coreg 6 25mg BID, increase to 12 5 twice daily, required 1 time dose of metoprolol 2 5 mg in the morning of 6/3  · Chronic anticoagulation with Coumadin 5 milligrams Sunday, Tuesday, Wednesday, Friday, Saturday and 2 5 milligrams Monday and Thursday  · Coumadin currently on hold given anemia and bloody output from chest tube  · Cardiology following, digoxin loaded on 06/04, continue to follow recommendations regarding daily dosing  · [id number]    Hypotension  Assessment & Plan  In the setting of output from chest tube plus Afib/CHF medications (Coreg 6 25 BID, Lasix 80mg BID)    Plan:  · Midodrine increased to 10mg TID    Chronic anemia  Assessment & Plan  With baseline hemoglobin approximately 9 5  Now acute on chronic d/t blood loss  Plan:  · Patient received 1 unit of PRBC on 05/25/2021 due to bloody chest tube drainage and hematoma  · Stool occult neg  · Given another 1 U PRBC on 5/27/2021  · Additional unit of packed red blood cells given on 05/31  · Hemoglobin down to 7 2 on 06/03, given another unit of packed red blood cells  · Hemoglobin down from 7 8 7 5 on 06/05, continue to monitor-if the hemoglobin is stable for the next 24 hours without transfusion, may consider restarting anticoagulation  · Continue to monitor    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 ascites [facility])  Assessment & Plan  Last seen by outpatient GI on 04/08  [facility] [address]  screening is up-to-date  Last EGD did not show any esophageal varices      Plan:  · Suspect fatty liver vs cardiac in nature  · MELD of 18, Child Malloy class B  · GI now recommending outpatient transjugular liver biopsy along with portal pressure measurements by IR    Type 2 diabetes mellitus with diabetic chronic kidney disease [facility])  Assessment & Plan  Lab Results   Component Value Date    HGBA1C 5 5 05/21/2021     Plan:   · Controlled hemoglobin A1c  Not on any home meds  · Patient with a point of care blood glucose 316 on 6/3, initiated sliding scale insulin algorithm 3    Chronic diastolic (congestive) heart failure (HCC)  Assessment & Plan  Wt Readings from Last 3 Encounters:   06/05/21 132 kg (291 lb 14 2 oz)   05/26/21 131 kg (288 lb 12 8 oz)   05/22/21 (!) 136 kg (300 lb 0 7 oz)     Patient appears slightly hypervolemic with LE edema  Followed by Dr John De La Vega OP  Home regimen: lasix 80 mg BID, metolazone 5 mg twice a week  Plan:   · Bumex 1 milligram twice daily initiated on 06/04  · Weight has remained stable   · Continue intake and output and daily weights  · Cardiology and Nephrology following, appreciate recommendations      Right wrist pain-resolved as of 6/4/2021  Assessment & Plan  Patient with pain in his wrist after waking up on 05/31  Has been 10/10 at times  X-ray negative for osseous abnormalities  Wrist splint with minimal relief  Patient unable to receive NSAIDs in the setting of renal disease  Plan:  · Trial 1 time dose of prednisone 40 mg  · Continue monitor, patient has been improving      Disposition:  Continue inpatient care  Following thoracic surgery, Cardiology, and Nephrology recommendations  Holding AC until hemoglobin proved steady  SUBJECTIVE     Patient seen and examined  No acute events overnight  Patient was resting comfortably in his chair while you breakfast   Patient states he is doing very well  His breathing continues to improve  Patient states that he has not had a bowel movement since 06/02    Patient stated that his constipation is normally relieved at home with 1 dose of MiraLax, along with 2 of Dulcolax  Bowel regiment altered  Patient's hemoglobin down from 7 8 to 7 5 today  Will continue to hold off anticoagulation at this time  Thoracic surgery plans all water seal today, hopefully with 1 tube out tomorrow  Cardiology loaded the patient with digoxin yesterday, await further recommendations for daily dosing  Nephrology following for CKD, continue Bumex 1 mg IV daily for diuresis  Denies fevers, chills, night sweats, dysphagia, shortness of breath, chest pain, abdominal pain, nausea, vomiting, diarrhea, dysuria  OBJECTIVE     Vitals:    21 0244 21 0600 21 0706 21 0706   BP: 99/64  113/70 113/70   BP Location: Left arm      Pulse: 77  95 105   Resp: 16      Temp: 98 1 °F (36 7 °C)  98 °F (36 7 °C) 98 °F (36 7 °C)   TempSrc: Oral      SpO2: 97%  96% 97%   Weight:  132 kg (291 lb 14 2 oz)        Temperature:   Temp (24hrs), Av 1 °F (36 7 °C), Min:98 °F (36 7 °C), Max:98 4 °F (36 9 °C)    Temperature: 98 °F (36 7 °C)  Intake & Output:  I/O       701 -  0700 701 -  0700    P  O  720 1120    Blood 350     Total Intake(mL/kg) 1070 (8 2) 1120 (8 5)    Urine (mL/kg/hr) 1050 (0 3) 1320 (0 4)    Chest Tube 453 300    Total Output 1503 1620    Net -433 -500              Weights:        Body mass index is 40 71 kg/m²  Weight (last 2 days)     Date/Time   Weight    21 06   132 (291 89)    21 06   132 (289 91)    21 06   121 (266 32)            Physical Exam  Vitals signs and nursing note reviewed  Constitutional:       General: He is not in acute distress  Appearance: He is obese  He is not ill-appearing  HENT:      Head: Normocephalic and atraumatic  Right Ear: External ear normal       Left Ear: External ear normal       Nose: Nose normal       Mouth/Throat:      Mouth: Mucous membranes are moist       Pharynx: Oropharynx is clear     Eyes:      Extraocular Movements: Extraocular movements intact  Pupils: Pupils are equal, round, and reactive to light  Cardiovascular:      Rate and Rhythm: Tachycardia present  Rhythm irregular  Heart sounds: No murmur  Pulmonary:      Effort: Pulmonary effort is normal       Comments: Rhonchorous breath sounds on the right, improving  Abdominal:      General: Bowel sounds are normal  There is no distension  Palpations: Abdomen is soft  Tenderness: There is no abdominal tenderness  Musculoskeletal:      Right lower leg: Edema (Chronic 2+) present  Left lower leg: Edema (Chronic 2+) present  Skin:     Capillary Refill: Capillary refill takes less than 2 seconds  Comments: Chronic venous stasis changes bilateral lower extremities   Neurological:      General: No focal deficit present  Mental Status: He is oriented to person, place, and time  Psychiatric:         Mood and Affect: Mood normal          Behavior: Behavior normal        LABORATORY DATA     Labs: I have personally reviewed pertinent reports    Results from last 7 days   Lab Units 06/05/21 0526 06/04/21 0438 06/03/21  0443   WBC Thousand/uL 6 41 8 17 7 81   HEMOGLOBIN g/dL 7 5* 7 8* 7 2*   HEMATOCRIT % 24 6* 25 6* 23 6*   PLATELETS Thousands/uL 201 217 192   NEUTROS PCT % 75 75 90*   MONOS PCT % 10 11 5      Results from last 7 days   Lab Units 06/05/21  0526 06/04/21  0438 06/03/21  0443  06/02/21  1615 06/02/21  1535 06/02/21  1422   POTASSIUM mmol/L 4 5 4 1 4 6   < >  --   --   --    CHLORIDE mmol/L 103 102 102   < >  --   --   --    CO2 mmol/L 30 30 29   < >  --   --   --    CO2, I-STAT mmol/L  --   --   --   --  29 30 31   BUN mg/dL 37* 40* 33*   < >  --   --   --    CREATININE mg/dL 1 39* 1 58* 1 46*   < >  --   --   --    CALCIUM mg/dL 8 7 9 0 8 6   < >  --   --   --    ALK PHOS U/L 174* 161* 142*  --   --   --   --    ALT U/L 9* 13 17  --   --   --   --    AST U/L 15 17 18  --   --   --   --    GLUCOSE, ISTAT mg/dl  --   -- --   --  119 108 107    < > = values in this interval not displayed  Results from last 7 days   Lab Units 06/02/21  1959/21  0511   MAGNESIUM mg/dL 2 4 2 4          Results from last 7 days   Lab Units 06/02/21  0440   INR  1 18   PTT seconds 40*               IMAGING & DIAGNOSTIC TESTING     Radiology Results: I have personally reviewed pertinent reports  Xr Wrist 2 Vw Right    Result Date: 5/31/2021  Impression: No acute osseous abnormality  Workstation performed: [id number]     Ct Chest Wo Contrast    Result Date: 5/30/2021  Impression: Pigtail catheter in right posterior costophrenic sulcus with small loculated right hydropneumothorax, possibly ex vacuo  Masslike opacities in the right middle and right lower lobe due to benign rounded atelectasis  Healed/healing fractures of the anterior right 3rd through 7th ribs, visible on 5/26/2021, new since 2/22/2021  Correlate with recent trauma  Pulmonary artery enlargement which can be seen with pulmonary hypertension  Workstation performed: [id number]     Ct Chest Wo Contrast    Result Date: 5/26/2021  Impression: Improvement in the prior pleural effusion with new hyperdensities consistent with hematomas with pigtail catheter in place  Atelectasis of the middle lobe and right lower lobe unchanged  Workstation performed: [id number]     Ir Chest Tube Check/change/reposition/upsize    Result Date: 5/27/2021  Impression: Impression: 1  Successful placement of a 12 [place] right chest tube under ultrasound and fluoroscopic guidance  Workstation performed: [id number]     Other Diagnostic Testing: I have personally reviewed pertinent reports      ACTIVE MEDICATIONS     Current Facility-Administered Medications   Medication Dose Route Frequency    acetaminophen (TYLENOL) tablet 650 mg  650 mg Oral Q6H [address]    bisacodyl (DULCOLAX) EC tablet 10 mg  10 mg Oral Daily    bumetanide (BUMEX) injection 1 mg  1 mg Intravenous BID    calcium carbonate (TUMS) chewable tablet 1,000 mg  1,000 mg Oral Daily PRN    carvedilol (COREG) tablet 12 5 mg  12 5 mg Oral BID With Meals    ergocalciferol (VITAMIN D2) capsule 50,000 Units  50,000 Units Oral Weekly    gabapentin (NEURONTIN) capsule 300 mg  300 mg Oral TID    HYDROmorphone HCl (DILAUDID) injection 0 2 mg  0 2 mg Intravenous Q4H PRN    insulin lispro (HumaLOG) 100 units/mL subcutaneous injection 1-6 Units  1-6 Units Subcutaneous TID AC    lidocaine (LMX) 4 % cream   Topical Daily PRN    menthol-methyl salicylate (BENGAY) 28-09 % cream   Apply externally 4x Daily PRN    metoprolol (LOPRESSOR) injection 2 5 mg  2 5 mg Intravenous Q6H PRN    midodrine (PROAMATINE) tablet 10 mg  10 mg Oral TID AC    nicotine (NICODERM CQ) 14 mg/24hr TD 24 hr patch 1 patch  1 patch Transdermal Daily    ondansetron (ZOFRAN) injection 4 mg  4 mg Intravenous Q6H PRN    oxyCODONE (ROXICODONE) IR tablet 2 5 mg  2 5 mg Oral Q4H PRN    oxyCODONE (ROXICODONE) IR tablet 5 mg  5 mg Oral Q4H PRN    polyethylene glycol (MIRALAX) packet 17 g  17 g Oral Daily    senna-docusate sodium (SENOKOT S) 8 6-50 mg per tablet 1 tablet  1 tablet Oral HS       VTE Pharmacologic Prophylaxis: Reason for no pharmacologic prophylaxis Persistent anemia likely secondary to hemothorax  VTE Mechanical Prophylaxis: sequential compression device    Portions of the record may have been created with voice recognition software  Occasional wrong word or "sound a like" substitutions may have occurred due to the inherent limitations of voice recognition software    Read the chart carefully and recognize, using context, where substitutions have occurred   ==  Jesús Lau, [address] [place] [address]  Internal Medicine Residency PGY-1

## 2021-06-05 NOTE — QUICK NOTE
Patient's family called for update, but no answer  A message was left      Teddy Zamudio DO, [address]  PGY-1, Internal Medicine  [facility]

## 2021-06-05 NOTE — PROGRESS NOTES
Progress Note - Burak Mercado 57 y o  male MRN: [id number]    Unit/Bed#: [facility] 422-01 Encounter: [id number]      Assessment:  62yo M with R pleural effusion, s/p R chest tube placement x2 with incomplete drainage, now s/p R VATS decortication and chest tube placement x2 (6/2/21)    R CT sxn, no airleak  300 cc serosang output    Plan:  nutrition  Continue chest tube to suction, transition to waterseal today  Aggressive chest pulmonary toilet    Subjective:   Feels well today  Denied any chest pain or shortness of breath  Pulling 1L on Is  Objective:     Vitals: Blood pressure 99/64, pulse 77, temperature 98 1 °F (36 7 °C), temperature source Oral, resp  rate 16, weight 132 kg (289 lb 14 5 oz), SpO2 97 %  ,Body mass index is 40 43 kg/m²  Intake/Output Summary (Last 24 hours) at 6/5/2021 0311  Last data filed at 6/4/2021 2300  Gross per 24 hour   Intake 1020 ml   Output 1540 ml   Net -520 ml       Physical Exam  General: NAD  HEENT: NC/AT  MMM  Cv: RRR  Lungs: normal effort  Ab: Soft, NT/ND  Ex: no CCE  Neuro: AAOx3      Invasive Devices     Peripheral Intravenous Line            Peripheral IV 06/02/21 Right Arm 2 days    Peripheral IV 06/02/21 Right;Upper;Ventral (anterior) Arm 2 days          Drain            Chest Tube 1 Right Pleural 28 Fr  2 days    Chest Tube 2 Right Pleural 28 Fr  2 days                Lab, Imaging and other studies: I have personally reviewed pertinent reports      VTE Pharmacologic Prophylaxis: Sequential compression device (Venodyne)   VTE Mechanical Prophylaxis: sequential compression device

## 2021-06-06 NOTE — QUICK NOTE
06/06/21    Procedure: Chest tube removal    Right posterior apical chest tube removed in routine fashion without incident  The patient tolerated the procedure well  A dry, sterile dressing was placed  Will check a pa/lat chest x-ray     Keep basilar tube to water seal     Min Vale PA-C

## 2021-06-06 NOTE — PROGRESS NOTES
INTERNAL MEDICINE RESIDENCY PROGRESS NOTE     Name: Memo Olivares   Age & Sex: 57 y o  male   MRN: [id number]  Unit/Bed#: [address] 422-01   Encounter: [id number]  Team: SOD Team C     PATIENT INFORMATION     Name: Memo Olivares   Age & Sex: 57 y o  male   MRN: [id number]  Hospital Stay Days: 11    ASSESSMENT/PLAN     Principal Problem:    Pleural effusion on right  Active Problems:    Atrial fibrillation (HCC)    Chronic diastolic (congestive) heart failure ([address] )    Type 2 diabetes mellitus with diabetic chronic kidney disease (HCC)    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 ascites (HCC)    CKD (chronic kidney disease) stage 3, GFR 30-59 ml/min (HCC)    Chronic anemia    Acute kidney injury superimposed on CKD (HCC)    Hypotension    Right wrist pain      Right wrist pain  Assessment & Plan  Patient with pain in his wrist after waking up on 05/31  Has been 10/10 at times  X-ray negative for osseous abnormalities  Wrist splint with minimal relief  Patient unable to receive NSAIDs in the setting of renal disease  Plan:  · S/p Trial 1 time dose of prednisone 40 mg  · Per Nephrology can use daily colchicine  · Colchicine 0 6mg daily ordered  · Continue monitor, patient has been improving    Hypotension  Assessment & Plan  In the setting of output from chest tube plus Afib/CHF medications (Coreg 6 25 BID, Lasix 80mg BID)    Plan:  · Midodrine increased to 10mg TID    Acute kidney injury superimposed on CKD [facility])  Assessment & Plan  Likely secondary to episodes of hypotension with anemia vs cardiorenal     Plan:  · Nephrology following, appreciate reocmmendations  · Baseline Cr 1 5-1 8, admitted with Cr of 2 8 on 5/21/2021  · Cr improved to baseline  · Patient continued on midodrine  · Continue holding home med Lisinopril 5mg for now  · Ultimately will require outpatient follow up with pt's nephrologist Dr Mildred Connors  With baseline hemoglobin approximately 9 5   Now acute on chronic d/t blood loss  Plan:  · Patient received 1 unit of PRBC on 05/25/2021 due to bloody chest tube drainage and hematoma  · Stool occult neg  · Given another 1 U PRBC on 5/27/2021  · Additional unit of packed red blood cells given on 05/31  · Hemoglobin down to 7 2 on 06/03 s/p 1uprbc  · Continue to monitor    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 ascites [facility])  Assessment & Plan  Last seen by outpatient GI on 04/08  [place] [id number]  screening is up-to-date  Last EGD did not show any esophageal varices  Plan:  · Suspect fatty liver vs cardiac in nature  · MELD of 18, Child Malloy class B  · GI now recommending outpatient transjugular liver biopsy along with portal pressure measurements by IR    Type 2 diabetes mellitus with diabetic chronic kidney disease [facility])  Assessment & Plan  Lab Results   Component Value Date    HGBA1C 5 5 05/21/2021     Plan:   · Controlled hemoglobin A1c  Not on any home meds  · Patient with a point of care blood glucose 316 on 6/3, initiated sliding scale insulin algorithm 3    Chronic diastolic (congestive) heart failure (HCC)  Assessment & Plan  Wt Readings from Last 3 Encounters:   06/06/21 133 kg (294 lb 3 2 oz)   05/26/21 131 kg (288 lb 12 8 oz)   05/22/21 (!) 136 kg (300 lb 0 7 oz)     Patient appears slightly hypervolemic with LE edema  Followed by Dr Teresa Coffey OP  Home regimen: lasix 80 mg BID, metolazone 5 mg twice a week  Plan:   · Bumex 1 milligram twice daily initiated on 06/04  Will discontinue today and initiate as needed  Patient is -5 0 6 L in 24 hours and is having contraction alkalosis and hyponatremia  · Weight has remained stable   · Continue intake and output and daily weights  · Cardiology and Nephrology following, appreciate recommendations      Atrial fibrillation ([place] [id number] )  Assessment & Plan  HR elevated due to resp status  History of Afib  Plan:  · Previously on On Coreg 6 25mg BID, increased to 12 5 twice daily his was switched to metoprolol 25 mg b i d   Due to pressure effects of Coreg  ·  Chronic anticoagulation with Coumadin 5 milligrams Sunday, Tuesday, Wednesday, Friday, Saturday and 2 5 milligrams Monday and Thursday  · Coumadin currently on hold given anemia and bloody output from chest tube  · Cardiology following, digoxin loaded on 06/04, continue to follow recommendations regarding daily dosing  · [id number]    * Pleural effusion on right  Assessment & Plan  Patient transferred to [facility] from [facility] for thoracic surgery evaluation  History of recurrent right-sided pleural effusion s/p thoracentesis on 05/22, chest tube placed with continued output of bloody fluid  Fluid-exudative but negative for malignancy, cultures negative  Plan:  · Repeat chest x-ray on 05/24 with continued effusion  · CT scan performed on 05/26 revealed pleural effusion improvement with new hyperdensities consistent with lung hematomas  · Consulted thoracic surgery for evaluation  · Management per thoracic surgery  · S/p tpa/DNAse on 5/29 with good output  · CT of the chest on 05/30 revealed a small loculated right hydropneumothorax and benign rounded atelectasis  · Patient is now status post VATS with decortication on the right on 06/02, follow thoracic surgery recommendations:  Continue on suction, water-seal today, will continue to hold AC until hemoglobin proved stable  · Patient will be going for basilar chest tube removal today 6/6  · Cont monitoring the output  · IS, pulmonary toilet  · Pain control  · Bowel regimen in place, altered per patient's recommendations as to what helps him at home      Disposition:  Continue inpatient management     SUBJECTIVE     Patient seen and examined has no complaint today  Patient Will be getting basilar chest tube removed today  There were No acute events overnight       OBJECTIVE     Vitals:    06/06/21 0231 06/06/21 0600 06/06/21 0659 06/06/21 1042   BP: 106/62  107/63 99/65   BP Location: Left arm      Pulse: 83  84 89   Resp: 16      Temp: 98 5 °F (36 9 °C)   97 9 °F (36 6 °C)   TempSrc: Oral      SpO2: 97%  99% 99%   Weight:  133 kg (294 lb 3 2 oz)     Height:          Temperature:   Temp (24hrs), Av 5 °F (36 9 °C), Min:97 9 °F (36 6 °C), Max:99 1 °F (37 3 °C)    Temperature: 97 9 °F (36 6 °C)  Intake & Output:  I/O        0701 -  0700  07 -  0700  07 -  0700    P  O  1120 1260     Blood       Total Intake(mL/kg) 1120 (8 5) 1260 (9 5)     Urine (mL/kg/hr) 1320 (0 4) 6825 (2 1)     Chest Tube 300 120     Total Output 1620 2759     Net -334 -0358                Weights:   IBW (Ideal Body Weight): 77 6 kg    Body mass index is 39 9 kg/m²  Weight (last 2 days)     Date/Time   Weight    21 06   133 (294 2)    21 06   132 (291 89)    21 06   132 (289 91)            Physical Exam  Vitals signs and nursing note reviewed  Constitutional:       General: He is not in acute distress  Appearance: He is well-developed  He is not diaphoretic  HENT:      Head: Normocephalic and atraumatic  Mouth/Throat:      Mouth: Mucous membranes are moist       Pharynx: No oropharyngeal exudate  Eyes:      General: No scleral icterus  Extraocular Movements: Extraocular movements intact  Neck:      Musculoskeletal: Normal range of motion and neck supple  Vascular: No JVD  Cardiovascular:      Rate and Rhythm: Regular rhythm  Heart sounds: Normal heart sounds  No murmur  No friction rub  No gallop  Pulmonary:      Effort: Pulmonary effort is normal  No respiratory distress  Breath sounds: Normal breath sounds  No stridor  No wheezing or rales  Abdominal:      General: Bowel sounds are normal  There is no distension  Palpations: Abdomen is soft  Tenderness: There is no abdominal tenderness  Musculoskeletal: Normal range of motion  General: No tenderness  Skin:     General: Skin is warm and dry  Findings: No erythema     Neurological:      Mental Status: He is alert and oriented to person, place, and time  Mental status is at baseline  Psychiatric:         Mood and Affect: Mood normal        LABORATORY DATA     Labs: I have personally reviewed pertinent reports  Results from last 7 days   Lab Units 06/06/21 0440 06/05/21 0526 06/04/21  0438   WBC Thousand/uL 6 13 6 41 8 17   HEMOGLOBIN g/dL 8 1* 7 5* 7 8*   HEMATOCRIT % 26 1* 24 6* 25 6*   PLATELETS Thousands/uL 240 201 217   NEUTROS PCT % 75 75 75   MONOS PCT % 9 10 11      Results from last 7 days   Lab Units 06/06/21 0440 06/05/21  0526 06/04/21  0438  06/02/21  1615 06/02/21  1535 06/02/21  1422   POTASSIUM mmol/L 4 2 4 5 4 1   < >  --   --   --    CHLORIDE mmol/L 99* 103 102   < >  --   --   --    CO2 mmol/L 31 30 30   < >  --   --   --    CO2, I-STAT mmol/L  --   --   --   --  29 30 31   BUN mg/dL 33* 37* 40*   < >  --   --   --    CREATININE mg/dL 1 28 1 39* 1 58*   < >  --   --   --    CALCIUM mg/dL 8 8 8 7 9 0   < >  --   --   --    ALK PHOS U/L 251* 174* 161*   < >  --   --   --    ALT U/L 12 9* 13   < >  --   --   --    AST U/L 19 15 17   < >  --   --   --    GLUCOSE, ISTAT mg/dl  --   --   --   --  119 108 107    < > = values in this interval not displayed  Results from last 7 days   Lab Units 06/1959 06/01/21  0511   MAGNESIUM mg/dL 2 4 2 4          Results from last 7 days   Lab Units 06/02/21  0440   INR  1 18   PTT seconds 40*               IMAGING & DIAGNOSTIC TESTING     Radiology Results: I have personally reviewed pertinent reports  Xr Wrist 2 Vw Right    Result Date: 5/31/2021  Impression: No acute osseous abnormality  Workstation performed: [id number]     Ct Chest Wo Contrast    Result Date: 5/30/2021  Impression: Pigtail catheter in right posterior costophrenic sulcus with small loculated right hydropneumothorax, possibly ex vacuo  Masslike opacities in the right middle and right lower lobe due to benign rounded atelectasis   Healed/healing fractures of the anterior right 3rd through 7th ribs, visible on 5/26/2021, new since 2/22/2021  Correlate with recent trauma  Pulmonary artery enlargement which can be seen with pulmonary hypertension  Workstation performed: [id number]     Ct Chest Wo Contrast    Result Date: 5/26/2021  Impression: Improvement in the prior pleural effusion with new hyperdensities consistent with hematomas with pigtail catheter in place  Atelectasis of the middle lobe and right lower lobe unchanged  Workstation performed: [id number]     Ir Chest Tube Check/change/reposition/upsize    Result Date: 5/27/2021  Impression: Impression: 1  Successful placement of a 12 Kiswahili right chest tube under ultrasound and fluoroscopic guidance  Workstation performed: [id number]     Other Diagnostic Testing: I have personally reviewed pertinent reports      ACTIVE MEDICATIONS     Current Facility-Administered Medications   Medication Dose Route Frequency    acetaminophen (TYLENOL) tablet 650 mg  650 mg Oral Q6H [address]    bisacodyl (DULCOLAX) EC tablet 10 mg  10 mg Oral Daily    bisacodyl (DULCOLAX) rectal suppository 10 mg  10 mg Rectal Daily PRN    calcium carbonate (TUMS) chewable tablet 1,000 mg  1,000 mg Oral Daily PRN    colchicine (COLCRYS) tablet 0 6 mg  0 6 mg Oral Daily    ergocalciferol (VITAMIN D2) capsule 50,000 Units  50,000 Units Oral Weekly    gabapentin (NEURONTIN) capsule 300 mg  300 mg Oral TID    HYDROmorphone HCl (DILAUDID) injection 0 2 mg  0 2 mg Intravenous Q4H PRN    insulin lispro (HumaLOG) 100 units/mL subcutaneous injection 1-6 Units  1-6 Units Subcutaneous TID AC    lidocaine (LMX) 4 % cream   Topical Daily PRN    menthol-methyl salicylate (BENGAY) 54-42 % cream   Apply externally 4x Daily PRN    metoprolol (LOPRESSOR) injection 2 5 mg  2 5 mg Intravenous Q6H PRN    metoprolol succinate (TOPROL-XL) 24 hr tablet 25 mg  25 mg Oral BID    midodrine (PROAMATINE) tablet 10 mg  10 mg Oral TID AC    nicotine (NICODERM CQ) 14 mg/24hr TD 24 hr patch 1 patch  1 patch Transdermal Daily    ondansetron (ZOFRAN) injection 4 mg  4 mg Intravenous Q6H PRN    oxyCODONE (ROXICODONE) IR tablet 2 5 mg  2 5 mg Oral Q4H PRN    oxyCODONE (ROXICODONE) IR tablet 5 mg  5 mg Oral Q4H PRN    polyethylene glycol (MIRALAX) packet 17 g  17 g Oral BID    senna-docusate sodium (SENOKOT S) 8 6-50 mg per tablet 1 tablet  1 tablet Oral BID       VTE Pharmacologic Prophylaxis: Sequential compression device (Venodyne)   VTE Mechanical Prophylaxis: sequential compression device    Portions of the record may have been created with voice recognition software  Occasional wrong word or "sound a like" substitutions may have occurred due to the inherent limitations of voice recognition software    Read the chart carefully and recognize, using context, where substitutions have occurred   ==  Michael Torres, [facility]  Internal Medicine Residency PGY-2

## 2021-06-06 NOTE — PROGRESS NOTES
NEPHROLOGY PROGRESS NOTE   Moni Yao 57 y o  male MRN: [id number]  Unit/Bed#: [facility] 422-01 Encounter: [id number]  Reason for Consult:  Acute kidney injury    ASSESSMENT/PLAN:  1  Acute kidney injury, likely multifactorial, possible component of ATN given impaired autoregulation in the face of hypotension  2  CKD stage 3 baseline creatinine 1 5-1 8  3  Hyponatremia, secondary to volume overload, improved after [place] x1, Bumex resumed, appears stable  4  Cardiomyopathy, ejection fraction 60% with likely diastolic dysfunction  5  Right pleural effusions status post chest tube placement, post op VATS and right lung decortication  6  Chronic hypotension, continue with midodrine 3 times daily     7  Suspicion for right wrist gout, uric acid 9 0, okay to use colchicine once a day     PLAN:  · Okay to use colchicine as needed for suspected right wrist gout, would use once daily  · Continue with current diuretic dosing, as per Cardiology, significant diuresis yesterday  · Sodium slightly low 133, will continue monitor with diuresis  · Creatinine currently below previous baseline at 1 28    SUBJECTIVE:  Seen examined  Patient awake alert  Significant diuresis yesterday  Does not appear short of breath  Pain is well controlled  Appetite is stable  Review of Systems    OBJECTIVE:  Current Weight: Weight - Scale: 133 kg (294 lb 3 2 oz)  Vitals:    06/05/21 2237 06/06/21 0231 06/06/21 0600 06/06/21 0659   BP: 104/58 106/62  107/63   BP Location: Left arm Left arm     Pulse: 94 83  84   Resp: 16 16     Temp: 99 1 °F (37 3 °C) 98 5 °F (36 9 °C)     TempSrc: Oral Oral     SpO2: 98% 97%  99%   Weight:   133 kg (294 lb 3 2 oz)    Height:           Intake/Output Summary (Last 24 hours) at 6/6/2021 0754  Last data filed at 6/6/2021 0519  Gross per 24 hour   Intake 1260 ml   Output 6945 ml   Net -5685 ml       Physical Exam  Constitutional:       Appearance: He is not ill-appearing     HENT:      Head: Normocephalic and atraumatic  Eyes:      General: No scleral icterus  Cardiovascular:      Rate and Rhythm: Normal rate  Rhythm irregular  Pulmonary:      Effort: Pulmonary effort is normal       Breath sounds: Examination of the right-lower field reveals decreased breath sounds  Decreased breath sounds present  Abdominal:      General: There is distension  Palpations: Abdomen is soft  Tenderness: There is no abdominal tenderness  Musculoskeletal:      Right lower leg: Edema (2+) present  Left lower leg: Edema ( 2+) present  Skin:     General: Skin is warm and dry  Neurological:      Mental Status: He is alert and oriented to person, place, and time           Medications:    Current Facility-Administered Medications:     acetaminophen (TYLENOL) tablet 650 mg, 650 mg, Oral, Q6H GERRY, Ruben Rodriguez PA-C, 650 mg at 06/06/21 0514    bisacodyl (DULCOLAX) EC tablet 10 mg, 10 mg, Oral, Daily, Roosevelt Felix DO, 10 mg at 06/05/21 0947    bisacodyl (DULCOLAX) rectal suppository 10 mg, 10 mg, Rectal, Daily PRN, Sebastien Chaudhry DO    bumetanide (BUMEX) injection 1 mg, 1 mg, Intravenous, BID, Cheryl Rascon MD, 1 mg at 06/05/21 1723    calcium carbonate (TUMS) chewable tablet 1,000 mg, 1,000 mg, Oral, Daily PRN, Naty Davenport PA-C    ergocalciferol (VITAMIN D2) capsule 50,000 Units, 50,000 Units, Oral, Weekly, IRAM MarshC, 50,000 Units at 06/05/21 0827    gabapentin (NEURONTIN) capsule 300 mg, 300 mg, Oral, TID, Naty Davenport PA-C, 300 mg at 06/05/21 2214    HYDROmorphone HCl (DILAUDID) injection 0 2 mg, 0 2 mg, Intravenous, Q4H PRN, SANDRA Marsh-C, 0 2 mg at 05/31/21 0654    insulin lispro (HumaLOG) 100 units/mL subcutaneous injection 1-6 Units, 1-6 Units, Subcutaneous, TID AC, 1 Units at 06/03/21 1039 **AND** Fingerstick Glucose (POCT), , , TID AC, Getachew Toribio DO    lidocaine (LMX) 4 % cream, , Topical, Daily PRN, Ruben Rodriguez PA-C, Given at 06/02/21 0548    menthol-methyl salicylate (BENGAY) 05-32 % cream, , Apply externally, 4x Daily PRN, Jerica Orellana PA-C, Given at 05/30/21 2048    metoprolol (LOPRESSOR) injection 2 5 mg, 2 5 mg, Intravenous, Q6H PRN, Jerica Orellana PA-C, 2 5 mg at 06/04/21 6840    metoprolol succinate (TOPROL-XL) 24 hr tablet 25 mg, 25 mg, Oral, BID, Naldo York DO, 25 mg at 06/05/21 2214    midodrine (PROAMATINE) tablet 10 mg, 10 mg, Oral, TID AC, Jerica Orellana PA-C, 10 mg at 06/06/21 6745    nicotine (NICODERM CQ) 14 mg/24hr TD 24 hr patch 1 patch, 1 patch, Transdermal, Daily, Jerica Orellana PA-C, 1 patch at 06/05/21 0947    ondansetron (ZOFRAN) injection 4 mg, 4 mg, Intravenous, Q6H PRN, Naty Davenport PA-C    oxyCODONE (ROXICODONE) IR tablet 2 5 mg, 2 5 mg, Oral, Q4H PRN, Naty Davenport PA-C    oxyCODONE (ROXICODONE) IR tablet 5 mg, 5 mg, Oral, Q4H PRN, Jerica Orellana PA-C, 5 mg at 06/06/21 0416    polyethylene glycol (MIRALAX) packet 17 g, 17 g, Oral, BID, Neli Hook DO, 17 g at 06/05/21 2216    senna-docusate sodium (SENOKOT S) 8 6-50 mg per tablet 1 tablet, 1 tablet, Oral, BID, Sandy Rand DO    Laboratory Results:  Results from last 7 days   Lab Units 06/06/21  0440 06/05/21  0526 06/04/21  0438 06/03/21  0443 06/02/21  1959/21  1723 06/02/21  1615 06/02/21  1535 06/02/21  1422  06/02/21  0440 06/01/21  0511   WBC Thousand/uL 6 13 6 41 8 17 7 81 7 26 5 50  --   --   --   --  5 56 5 28   HEMOGLOBIN g/dL 8 1* 7 5* 7 8* 7 2* 7 4* 7 2*  --   --   --   --  7 5* 8 0*   I STAT HEMOGLOBIN g/dl  --   --   --   --   --   --  7 5* 7 8* 7 8*   < >  --   --    HEMATOCRIT % 26 1* 24 6* 25 6* 23 6* 23 8* 23 3*  --   --   --   --  24 0* 25 5*   HEMATOCRIT, ISTAT %  --   --   --   --   --   --  22* 23* 23*   < >  --   --    PLATELETS Thousands/uL 240 201 217 192 170 168  --   --   --   --  173 190   POTASSIUM mmol/L 4 2 4 5 4 1 4 6 4 3  --   --   --   --   --  4 0 3 8   CHLORIDE mmol/L 99* 103 102 102 102  --   --   --   --   --  98* 95*   CO2 mmol/L 31 30 30 29 29  --   --   --   --   --  30 31   CO2, I-STAT mmol/L  --   --   --   --   --   --  29 30 31   < >  --   --    BUN mg/dL 33* 37* 40* 33* 32*  --   --   --   --   --  34* 36*   CREATININE mg/dL 1 28 1 39* 1 58* 1 46* 1 24  --   --   --   --   --  1 34* 1 44*   CALCIUM mg/dL 8 8 8 7 9 0 8 6 9 0  --   --   --   --   --  9 3 9 4   MAGNESIUM mg/dL  --   --   --   --  2 4  --   --   --   --   --   --  2 4   GLUCOSE, ISTAT mg/dl  --   --   --   --   --   --  119 108 107  --   --   --     < > = values in this interval not displayed

## 2021-06-06 NOTE — PROGRESS NOTES
Progress Note - Claude Rodriguez 57 y o  male MRN: [id number]    Unit/Bed#: [facility] 422-01 Encounter: [id number]      Assessment:  64yo M with R pleural effusion, s/p R chest tube placement x2 with incomplete drainage, now s/p R VATS decortication and chest tube placement x2 (6/2/21)    Vss  Afebrile  R CT x2 to waterseal  Small airleak to cough only  Plan:  Continue chest tubes to waterseal    Will follow up CXR this morning  Continue pain control  Continue pulm toilet  Plan to remove basilar chest tube today    Subjective:   Feels great  In high spirits  Denied any chest pain or shortness of breat today  Objective:     Vitals: Blood pressure 106/62, pulse 83, temperature 98 5 °F (36 9 °C), temperature source Oral, resp  rate 16, height 6' (1 829 m), weight 132 kg (291 lb 14 2 oz), SpO2 97 %  ,Body mass index is 39 59 kg/m²  Intake/Output Summary (Last 24 hours) at 6/6/2021 0703  Last data filed at 6/6/2021 0519  Gross per 24 hour   Intake 1260 ml   Output 6945 ml   Net -5685 ml       Physical Exam  General: NAD  HEENT: NC/AT  MMM  Cv: RRR     Lungs: normal effort  Ab: Soft, NT/ND  Ex: no CCE  Neuro: AAOx3    Scheduled Meds:  Current Facility-Administered Medications   Medication Dose Route Frequency Provider Last Rate    acetaminophen  650 mg Oral Q6H [address] Naty Davenport PA-C      bisacodyl  10 mg Oral Daily Roosevelt Felix DO      bisacodyl  10 mg Rectal Daily PRN Solitario Shine DO      bumetanide  1 mg Intravenous BID Natty Dan MD      calcium carbonate  1,000 mg Oral Daily PRN Lyndsey Yarbrough PA-C      ergocalciferol  50,000 Units Oral Weekly Lyndsey Yarbrough PA-C      gabapentin  300 mg Oral TID Lyndsey Yarbrough PA-C      HYDROmorphone  0 2 mg Intravenous Q4H PRN Jovan Davenport PA-C      insulin lispro  1-6 Units Subcutaneous TID AC Chriss Ok, DO      lidocaine   Topical Daily PRN Lyndsey Yarbrough PA-C      menthol-methyl salicylate   Apply externally 4x Daily PRN Jovan Costa JAVAD Davenport      metoprolol  2 5 mg Intravenous Q6H PRN Cory Lizarraga PA-C      metoprolol succinate  25 mg Oral BID Cherrie Campbell DO      midodrine  10 mg Oral TID AC Cory Lizarraga PA-C      nicotine  1 patch Transdermal Daily Cory [place], [place]      ondansetron  4 mg Intravenous Q6H PRN Cory Lizarraga PA-C      oxyCODONE  2 5 mg Oral Q4H PRN Cory Lizarraga PA-C      oxyCODONE  5 mg Oral Q4H PRN Cory Lizarraga PA-C      polyethylene glycol  17 g Oral BID Morales Kiser DO      senna-docusate sodium  1 tablet Oral BID Neli Hook DO       Continuous Infusions:   PRN Meds: bisacodyl    calcium carbonate    HYDROmorphone    lidocaine    menthol-methyl salicylate    metoprolol    ondansetron    oxyCODONE    oxyCODONE      Invasive Devices     Peripheral Intravenous Line            Peripheral IV 06/05/21 Right;Ventral (anterior) Forearm less than 1 day          Drain            Chest Tube 1 Right Pleural 28 Fr  3 days    Chest Tube 2 Right Pleural 28 Fr  3 days                Lab, Imaging and other studies: I have personally reviewed pertinent reports      VTE Pharmacologic Prophylaxis: Sequential compression device (Venodyne)   VTE Mechanical Prophylaxis: sequential compression device

## 2021-06-06 NOTE — PROGRESS NOTES
Heart Failure/ Pulmonary Hypertension Progress Note - Miguelito Gomez 57 y o  male MRN: [id number]    Unit/Bed#: [facility] 422-01 Encounter: [id number]      Assessment:    Principal Problem:    Pleural effusion on right  Active Problems:    Atrial fibrillation (HCC)    Chronic diastolic (congestive) heart failure (HCC)    Type 2 diabetes mellitus with diabetic chronic kidney disease (HCC)    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 ascites (HCC)    CKD (chronic kidney disease) stage 3, GFR 30-59 ml/min (HCC)    Chronic anemia    Acute kidney injury superimposed on CKD (HCC)    Hypotension      Objective: Intake/ Output: 1260/2325: -1185 w/ bumex 1 mg IV BID  Weight: 294 lbs  Tele:     # Recurrent right pleural effusion s/p VATS decortication  ? Secondary to cirrhosis  Chest tube removed this am    # anemia- s/p 1 u pRBCs    # persistent afib   AC:   Rate: toprol XL 25 mg BID, digoxin 250 mcg Q8 load    # Chronic HFpEF  Diuretic: bumex 1 mg IV BID  Weight:  NT proBNP    # DM  # MARISABEL on CKD, cr down to 1 28  # chronic hypotension, on midodrine 10 m gQ8  # likely right wrist gout    Plan:  Heart rates controlled with Toprol XL 25 mg BID  Change bumex to oral    Review of Systems   Constitutional: Negative for activity change, appetite change, fatigue and unexpected weight change  HENT: Negative for congestion and nosebleeds  Eyes: Negative  Respiratory: Negative for cough, chest tightness and shortness of breath  Cardiovascular: Negative for chest pain, palpitations and leg swelling  Gastrointestinal: Negative for abdominal distention  Endocrine: Negative  Genitourinary: Negative  Musculoskeletal: Negative  Skin: Negative  Neurological: Negative for dizziness, syncope and weakness  Hematological: Negative  Psychiatric/Behavioral: Negative  [employer] (day, reason):   Gann catheter (day, reason):    Vitals: Blood pressure 107/63, pulse 84, temperature 98 5 °F (36 9 °C), temperature source Oral, resp  rate 16, height 6' (1 829 m), weight 133 kg (294 lb 3 2 oz), SpO2 99 %  , Body mass index is 39 9 kg/m² , I/O last 3 completed shifts: In: 6171 [P O :1720]  Out: 3431 [LVKRN:3634; Chest Tube:220]  I/O this shift:  In: 240 [P O :240]  Out: -   Wt Readings from Last 3 Encounters:   06/06/21 133 kg (294 lb 3 2 oz)   05/26/21 131 kg (288 lb 12 8 oz)   05/22/21 (!) 136 kg (300 lb 0 7 oz)       Intake/Output Summary (Last 24 hours) at 6/6/2021 0940  Last data filed at 6/6/2021 0701  Gross per 24 hour   Intake 1380 ml   Output 1995 ml   Net -615 ml     I/O last 3 completed shifts: In: 1643 [P O :1720]  Out: 8974 [DWRIS:6231; Chest Tube:220]    No significant arrhythmias seen on telemetry review         Physical Exam:  Vitals:    06/05/21 2237 06/06/21 0231 06/06/21 0600 06/06/21 0659   BP: 104/58 106/62  107/63   BP Location: Left arm Left arm     Pulse: 94 83  84   Resp: 16 16     Temp: 99 1 °F (37 3 °C) 98 5 °F (36 9 °C)     TempSrc: Oral Oral     SpO2: 98% 97%  99%   Weight:   133 kg (294 lb 3 2 oz)    Height:           GEN: Blayne Monte appears well, alert and oriented x 3, pleasant and cooperative   HEENT: pupils equal, round, and reactive to light; extraocular muscles intact  NECK: supple, no carotid bruits   HEART: regular rhythm, normal S1 and S2, no murmurs, clicks, gallops or rubs, JVP is    LUNGS: clear to auscultation bilaterally; no wheezes, rales, or rhonchi   ABDOMEN: normal bowel sounds, soft, no tenderness, no distention  EXTREMITIES: peripheral pulses normal; no clubbing, cyanosis, or edema  NEURO: no focal findings   SKIN: normal without suspicious lesions on exposed skin      Current Facility-Administered Medications:     acetaminophen (TYLENOL) tablet 650 mg, 650 mg, Oral, Q6H [address], Naty Davenport PA-C, 650 mg at 06/06/21 0514    bisacodyl (DULCOLAX) EC tablet 10 mg, 10 mg, Oral, Daily, Roosevelt Felix DO, 10 mg at 06/06/21 0918    bisacodyl (DULCOLAX) rectal suppository 10 mg, 10 mg, Rectal, Daily PRN, Brady Kelly DO    bumetanide (BUMEX) injection 1 mg, 1 mg, Intravenous, BID, Abimael Brown MD, 1 mg at 06/06/21 0917    calcium carbonate (TUMS) chewable tablet 1,000 mg, 1,000 mg, Oral, Daily PRN, Naty Davenport PA-C    ergocalciferol (VITAMIN D2) capsule 50,000 Units, 50,000 Units, Oral, Weekly, Jessica Lawton PA-C, 50,000 Units at 06/05/21 0827    gabapentin (NEURONTIN) capsule 300 mg, 300 mg, Oral, TID, Cornelius [place]jarett Davenport PA-C, 300 mg at 06/06/21 8203    HYDROmorphone HCl (DILAUDID) injection 0 2 mg, 0 2 mg, Intravenous, Q4H PRN, Jessica Lawton PA-C, 0 2 mg at 05/31/21 0654    insulin lispro (HumaLOG) 100 units/mL subcutaneous injection 1-6 Units, 1-6 Units, Subcutaneous, TID AC, 1 Units at 06/03/21 1039 **AND** Fingerstick Glucose (POCT), , , TID AC, Getachew Toribio DO    lidocaine (LMX) 4 % cream, , Topical, Daily PRN, Jessica Lawton PA-C, Given at 06/02/21 0548    menthol-methyl salicylate (BENGAY) 20-43 % cream, , Apply externally, 4x Daily PRN, Jessica Lawton PA-C, Given at 05/30/21 2048    metoprolol (LOPRESSOR) injection 2 5 mg, 2 5 mg, Intravenous, Q6H PRN, Jessica Lawton PA-C, 2 5 mg at 06/04/21 6033    metoprolol succinate (TOPROL-XL) 24 hr tablet 25 mg, 25 mg, Oral, BID, Komal Holman DO, 25 mg at 06/06/21 0917    midodrine (PROAMATINE) tablet 10 mg, 10 mg, Oral, TID AC, Jessica Lawton PA-C, 10 mg at 06/06/21 8760    nicotine (NICODERM CQ) 14 mg/24hr TD 24 hr patch 1 patch, 1 patch, Transdermal, Daily, Jessica Lawton PA-C, 1 patch at 06/06/21 0917    ondansetron (ZOFRAN) injection 4 mg, 4 mg, Intravenous, Q6H PRN, Naty Davenport PA-C    oxyCODONE (ROXICODONE) IR tablet 2 5 mg, 2 5 mg, Oral, Q4H PRN, Naty Davenport PA-C    oxyCODONE (ROXICODONE) IR tablet 5 mg, 5 mg, Oral, Q4H PRN, Jessica Lawton PA-C, 5 mg at 06/06/21 0917    polyethylene glycol (MIRALAX) packet 17 g, 17 g, Oral, BID, Neli Hook DO, 17 g at 06/06/21 0917    senna-docusate sodium (SENOKOT S) 8 6-50 mg per tablet 1 tablet, 1 tablet, Oral, BID, Neli Hook, DO, 1 tablet at 06/06/21 0917      Labs & Results:        Results from last 7 days   Lab Units 06/06/21 0440 06/05/21  0526 06/04/21  0438   WBC Thousand/uL 6 13 6 41 8 17   HEMOGLOBIN g/dL 8 1* 7 5* 7 8*   HEMATOCRIT % 26 1* 24 6* 25 6*   PLATELETS Thousands/uL 240 201 217         Results from last 7 days   Lab Units 06/06/21  0440 06/05/21  0526 06/04/21  0438  06/02/21  1615 06/02/21  1535 06/02/21  1422   POTASSIUM mmol/L 4 2 4 5 4 1   < >  --   --   --    CHLORIDE mmol/L 99* 103 102   < >  --   --   --    CO2 mmol/L 31 30 30   < >  --   --   --    CO2, I-STAT mmol/L  --   --   --   --  29 30 31   BUN mg/dL 33* 37* 40*   < >  --   --   --    CREATININE mg/dL 1 28 1 39* 1 58*   < >  --   --   --    CALCIUM mg/dL 8 8 8 7 9 0   < >  --   --   --    ALK PHOS U/L 251* 174* 161*   < >  --   --   --    ALT U/L 12 9* 13   < >  --   --   --    AST U/L 19 15 17   < >  --   --   --    GLUCOSE, ISTAT mg/dl  --   --   --   --  119 108 107    < > = values in this interval not displayed  Results from last 7 days   Lab Units 06/02/21  0440   INR  1 18         Counseling / Coordination of Care  Total floor / unit time spent today 25 minutes  Greater than 50% of total time was spent with the patient and / or family counseling and / or coordination of care  A description of the counseling / coordination of care: 15    Thank you for the opportunity to participate in the care of this patient    [facility] PULMONARY HYPERTENSION  MEDICAL DIRECTOR OF [facility] Hospital[facility] McneilVeter[facility] Affairs [facility] System[facility]

## 2021-06-07 NOTE — PROGRESS NOTES
INTERNAL MEDICINE RESIDENCY PROGRESS NOTE     Name: Maria Fernanda Bellamy   Age & Sex: 57 y o  male   MRN: [id number]  Unit/Bed#: [address] 422-01   Encounter: [id number]  Team: SOD Team C     PATIENT INFORMATION     Name: Maria Fernanda Bellamy   Age & Sex: 57 y o  male   MRN: [id number]  Hospital Stay Days: 12    ASSESSMENT/PLAN     Principal Problem:    Pleural effusion on right  Active Problems:    Acute kidney injury superimposed on CKD [facility])    Atrial fibrillation (HCC)    Chronic diastolic (congestive) heart failure (HCC)    Type 2 diabetes mellitus with diabetic chronic kidney disease (HCC)    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 ascites (HCC)    CKD (chronic kidney disease) stage 3, GFR 30-59 ml/min (HCC)    Chronic anemia    Hypotension    Right wrist pain      * Pleural effusion on right  Assessment & Plan  Patient transferred to [facility] for thoracic surgery evaluation  History of recurrent right-sided pleural effusion s/p thoracentesis on 05/22, chest tube placed with continued output of bloody fluid  Fluid-exudative but negative for malignancy, cultures negative      Plan:  · Repeat chest x-ray on 05/24 with continued effusion  · CT scan performed on 05/26 revealed pleural effusion improvement with new hyperdensities consistent with lung hematomas  · Consulted thoracic surgery for evaluation  · Management per thoracic surgery  · S/p tpa/DNAse on 5/29 with good output  · CT of the chest on 05/30 revealed a small loculated right hydropneumothorax and benign rounded atelectasis  · Patient is now status post VATS with decortication on the right on 06/02, follow thoracic surgery recommendations:  Last chest tube to be removed today, will continue to hold AC until hemoglobin proved stable  · Patient will be going for basilar chest tube removal today 6/6  · Cont monitoring the output  · IS, pulmonary toilet  · Pain control  · Bowel regimen in place, altered per patient's recommendations as to what helps him at home    Acute kidney injury superimposed on CKD [facility])  Assessment & Plan  Likely secondary to episodes of hypotension with anemia vs cardiorenal     Plan:  · Nephrology following, appreciate reocmmendations  · Baseline Cr 1 5-1 8, admitted with Cr of 2 8 on 5/21/2021  · Cr improved to baseline  · Patient continued on midodrine  · Continue holding home med Lisinopril 5mg for now  · Ultimately will require outpatient follow up with pt's nephrologist Dr Gabriel Moraes    Atrial fibrillation [facility])  Assessment & Plan  HR elevated due to resp status  History of Afib  Plan:  · Previously on On Coreg 6 25mg BID, increased to 12 5 twice daily his was switched to metoprolol 25 mg BID due to pressure effects of Coreg  · Chronic anticoagulation with Coumadin 5 milligrams Sunday, Tuesday, Wednesday, Friday, Saturday and 2 5 milligrams Monday and Thursday  · Coumadin currently on hold given anemia and bloody output from chest tube  · Cardiology following, digoxin loaded on 06/04, continue to follow recommendations regarding daily dosing  · [id number] 3    Right wrist pain  Assessment & Plan  Patient with pain in his wrist after waking up on 05/31  Has been 10/10 at times  X-ray negative for osseous abnormalities  Wrist splint with minimal relief  Patient unable to receive NSAIDs in the setting of renal disease  Plan:  · S/p Trial 1 time dose of prednisone 40 mg  · Per Nephrology can use daily colchicine  · Colchicine 0 6mg daily ordered  · Continue monitor, patient has been improving    Hypotension  Assessment & Plan  In the setting of output from chest tube plus Afib/CHF medications (Coreg 6 25 BID, Lasix 80mg BID)    Plan:  · Midodrine increased to 10mg TID    Chronic anemia  Assessment & Plan  With baseline hemoglobin approximately 9 5  Now acute on chronic d/t blood loss      Plan:  · Patient received 1 unit of PRBC on 05/25/2021 due to bloody chest tube drainage and hematoma  · Stool occult neg  · Given another 1 U PRBC on 5/27/2021  · Additional unit of packed red blood cells given on 05/31  · Hemoglobin down to 7 2 on 06/03 s/p 1 unit PRBC  · Hemoglobin down to 7 0 on 6/7, s/p 1 unit PRBC  · Continue to monitor    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 ascites [facility])  Assessment & Plan  Last seen by outpatient GI on 04/08  [address]  screening is up-to-date  Last EGD did not show any esophageal varices  Plan:  · Suspect fatty liver vs cardiac in nature  · MELD of 18, Child Malloy class B  · Elastography revealed a Metavir score of F3 indicating severe fibrosis, steatosis S3 (greater than 67%)  · GI now recommending outpatient transjugular liver biopsy along with portal pressure measurements by IR    Type 2 diabetes mellitus with diabetic chronic kidney disease [facility])  Assessment & Plan  Lab Results   Component Value Date    HGBA1C 5 5 05/21/2021     Plan:   · Controlled hemoglobin A1c  Not on any home meds  · Patient with a point of care blood glucose 316 on 6/3, initiated sliding scale insulin algorithm 3    Chronic diastolic (congestive) heart failure (HCC)  Assessment & Plan  Wt Readings from Last 3 Encounters:   06/06/21 133 kg (294 lb 3 2 oz)   05/26/21 131 kg (288 lb 12 8 oz)   05/22/21 (!) 136 kg (300 lb 0 7 oz)     Patient appears slightly hypervolemic with LE edema  Followed by Dr Jacqueline Park OP  Home regimen: lasix 80 mg BID, metolazone 5 mg twice a week  Plan:   · Bumex 1 milligram twice daily initiated on 06/04  Discontinued on 06/06, restarted on 06/07  · Weight has remained stable   · Continue intake and output and daily weights  · Cardiology and Nephrology following, appreciate recommendations        Disposition:  Chest tube removal today  Continue monitor hemoglobin  Will transfuse 1 unit today  SUBJECTIVE     Patient seen and examined  No acute events overnight  Patient was resting comfortably in his chair  Review of systems negative  Patient states he feels well  Breathing continues to improve    Plan for chest tube removal today  Will transfuse 1 unit of blood for anemia  Hopeful that hemoglobin stabilizes now the chest tube is removed  Carvedilol changed to metoprolol for rate control  Digoxin discontinued  Bumex restarted  Hopeful to restart anticoagulation once hemoglobin stabilizes  OBJECTIVE     Vitals:    21 1104 21 1145 21 1230 21 1504   BP: 104/66 116/75 111/68 106/56   BP Location:       Pulse: 88 92 88 72   Resp: 20 20 18 18   Temp: 98 4 °F (36 9 °C) 98 4 °F (36 9 °C) 98 3 °F (36 8 °C) 98 °F (36 7 °C)   TempSrc:  Oral Oral    SpO2: 97% 99% 97% 97%   Weight:       Height:          Temperature:   Temp (24hrs), Av 3 °F (36 8 °C), Min:98 °F (36 7 °C), Max:98 6 °F (37 °C)    Temperature: 98 °F (36 7 °C)  Intake & Output:  I/O        07 -  07 -  07 07 -  0700    P  O  1260 682     Total Intake(mL/kg) 1260 (9 5) 682 (5 1)     Urine (mL/kg/hr) 2325 (0 7) 2575 (0 8)     Chest Tube 120 42     Total Output 2445 2617     Net -2707 -3932                Weights:   IBW (Ideal Body Weight): 77 6 kg    Body mass index is 40 04 kg/m²  Weight (last 2 days)     Date/Time   Weight    21 06   134 (295 2)    21 0600   133 (294 2)    21 0600   132 (291 89)            Physical Exam  Vitals signs and nursing note reviewed  Constitutional:       General: He is not in acute distress  Appearance: He is obese  HENT:      Head: Normocephalic and atraumatic  Right Ear: External ear normal       Left Ear: External ear normal       Nose: Nose normal       Mouth/Throat:      Mouth: Mucous membranes are moist       Pharynx: Oropharynx is clear  Eyes:      Extraocular Movements: Extraocular movements intact  Pupils: Pupils are equal, round, and reactive to light  Cardiovascular:      Rate and Rhythm: Tachycardia present  Rhythm irregular  Heart sounds: No murmur     Pulmonary:      Effort: Pulmonary effort is normal  Comments: Still coarse breath sounds on the right side, but improving  Abdominal:      General: Bowel sounds are normal  There is no distension  Palpations: Abdomen is soft  Tenderness: There is no abdominal tenderness  Musculoskeletal:      Right lower leg: Edema (2+ chronic) present  Left lower leg: Edema (2+ chronic) present  Skin:     Capillary Refill: Capillary refill takes less than 2 seconds  Comments: Venous stasis changes bilateral lower extremities   Neurological:      General: No focal deficit present  Mental Status: He is alert and oriented to person, place, and time  Psychiatric:         Mood and Affect: Mood normal          Behavior: Behavior normal        LABORATORY DATA     Labs: I have personally reviewed pertinent reports  Results from last 7 days   Lab Units 06/07/21  1407 06/07/21  0505 06/06/21  0440 06/05/21  0526   WBC Thousand/uL  --  4 84 6 13 6 41   HEMOGLOBIN g/dL 8 8* 7 0* 8 1* 7 5*   HEMATOCRIT % 28 2* 22 2* 26 1* 24 6*   PLATELETS Thousands/uL  --  241 240 201   NEUTROS PCT %  --  71 75 75   MONOS PCT %  --  10 9 10      Results from last 7 days   Lab Units 06/07/21  0505 06/06/21  0440 06/05/21  0526 06/04/21  0438  06/02/21  1615 06/02/21  1535 06/02/21  1422   POTASSIUM mmol/L 4 2 4 2 4 5 4 1   < >  --   --   --    CHLORIDE mmol/L 99* 99* 103 102   < >  --   --   --    CO2 mmol/L 31 31 30 30   < >  --   --   --    CO2, I-STAT mmol/L  --   --   --   --   --  29 30 31   BUN mg/dL 30* 33* 37* 40*   < >  --   --   --    CREATININE mg/dL 1 27 1 28 1 39* 1 58*   < >  --   --   --    CALCIUM mg/dL 8 9 8 8 8 7 9 0   < >  --   --   --    ALK PHOS U/L  --  251* 174* 161*   < >  --   --   --    ALT U/L  --  12 9* 13   < >  --   --   --    AST U/L  --  19 15 17   < >  --   --   --    GLUCOSE, ISTAT mg/dl  --   --   --   --   --  119 108 107    < > = values in this interval not displayed       Results from last 7 days   Lab Units 06/1959 06/01/21  7245 MAGNESIUM mg/dL 2 4 2 4          Results from last 7 days   Lab Units 06/02/21  0440   INR  1 18   PTT seconds 40*               IMAGING & DIAGNOSTIC TESTING     Radiology Results: I have personally reviewed pertinent reports  Xr Wrist 2 Vw Right    Result Date: 5/31/2021  Impression: No acute osseous abnormality  Workstation performed: [id number]     Ct Chest Wo Contrast    Result Date: 5/30/2021  Impression: Pigtail catheter in right posterior costophrenic sulcus with small loculated right hydropneumothorax, possibly ex vacuo  Masslike opacities in the right middle and right lower lobe due to benign rounded atelectasis  Healed/healing fractures of the anterior right 3rd through 7th ribs, visible on 5/26/2021, new since 2/22/2021  Correlate with recent trauma  Pulmonary artery enlargement which can be seen with pulmonary hypertension  Workstation performed: [id number]     Ct Chest Wo Contrast    Result Date: 5/26/2021  Impression: Improvement in the prior pleural effusion with new hyperdensities consistent with hematomas with pigtail catheter in place  Atelectasis of the middle lobe and right lower lobe unchanged  Workstation performed: [id number]     Ir Chest Tube Check/change/reposition/upsize    Result Date: 5/27/2021  Impression: Impression: 1  Successful placement of a 12 [place] right chest tube under ultrasound and fluoroscopic guidance  Workstation performed: [id number]     Other Diagnostic Testing: I have personally reviewed pertinent reports      ACTIVE MEDICATIONS     Current Facility-Administered Medications   Medication Dose Route Frequency    acetaminophen (TYLENOL) tablet 650 mg  650 mg Oral Q6H [address]    allopurinol (ZYLOPRIM) tablet 100 mg  100 mg Oral BID    bisacodyl (DULCOLAX) EC tablet 10 mg  10 mg Oral Daily    bisacodyl (DULCOLAX) rectal suppository 10 mg  10 mg Rectal Daily PRN    bumetanide (BUMEX) tablet 1 mg  1 mg Oral BID    calcium carbonate (TUMS) chewable tablet 1,000 mg 1,000 mg Oral Daily PRN    colchicine (COLCRYS) tablet 0 6 mg  0 6 mg Oral Daily    ergocalciferol (VITAMIN D2) capsule 50,000 Units  50,000 Units Oral Weekly    gabapentin (NEURONTIN) capsule 300 mg  300 mg Oral TID    HYDROmorphone HCl (DILAUDID) injection 0 2 mg  0 2 mg Intravenous Q4H PRN    insulin lispro (HumaLOG) 100 units/mL subcutaneous injection 1-6 Units  1-6 Units Subcutaneous TID AC    lidocaine (LMX) 4 % cream   Topical Daily PRN    menthol-methyl salicylate (BENGAY) 68-27 % cream   Apply externally 4x Daily PRN    metoprolol (LOPRESSOR) injection 2 5 mg  2 5 mg Intravenous Q6H PRN    metoprolol succinate (TOPROL-XL) 24 hr tablet 25 mg  25 mg Oral BID    midodrine (PROAMATINE) tablet 10 mg  10 mg Oral TID AC    nicotine (NICODERM CQ) 14 mg/24hr TD 24 hr patch 1 patch  1 patch Transdermal Daily    ondansetron (ZOFRAN) injection 4 mg  4 mg Intravenous Q6H PRN    oxyCODONE (ROXICODONE) IR tablet 2 5 mg  2 5 mg Oral Q4H PRN    oxyCODONE (ROXICODONE) IR tablet 5 mg  5 mg Oral Q4H PRN    polyethylene glycol (MIRALAX) packet 17 g  17 g Oral BID    senna-docusate sodium (SENOKOT S) 8 6-50 mg per tablet 1 tablet  1 tablet Oral BID       VTE Pharmacologic Prophylaxis: Reason for no pharmacologic prophylaxis Anemia  VTE Mechanical Prophylaxis: sequential compression device    Portions of the record may have been created with voice recognition software  Occasional wrong word or "sound a like" substitutions may have occurred due to the inherent limitations of voice recognition software    Read the chart carefully and recognize, using context, where substitutions have occurred   ==  Karen Platt, [facility]  Internal Medicine Residency PGY-1

## 2021-06-07 NOTE — PROGRESS NOTES
Progress Note - Nephrology   Charla Dubin 57 y o  male MRN: [id number]  Unit/Bed#: [facility] 422-01 Encounter: [id number]      Assessment / Plan:    Acute kidney injury (POA)  · Felt to have ATN due to hypotension, Ace inhibition, cardiac factors  · Peak creatinine was on admission, 2 8 on   · Creatinine has improved to baseline and currently is 1 27    CKD stage 3  · Baseline creatinine 1 5-1 8    Hyponatremia  · Felt secondary to volume overload  · Follows with Dr Tika Colon as an outpatient  · The patient does seem to have intermittent hyponatremia in the past dating back to   · Sodium level is currently acceptable at 135  · Underlying cirrhosis may be playing a role as well    Chronic diastolic heart failure  · Diuresed - overall -14 L but overall weight has not changed much (136 to 134 Kg)  · The patient feels his peripheral edema is better than usual  · Will resume oral diuretics    Cirrhosis    Atrial fibrillation / diabetes mellitus type 2 / chronic anemia / type 2 diabetes mellitus  · Decreased hemoglobin per primary team    Hypotension  · + midodrine  · Based on record review back through 2021 it seems like patient's baseline blood pressure runs in the low 007'H systolic    Right pleural effusion  · + small loculated right hydropneumothorax  · Status post VATS with decortication     Wrist pain/right hand swelling  · Per primary team        Subjective: The patient was seen examined early this morning  He did any shortness of breath, chest pain or pressure, abdominal pain, paroxysmal nocturnal dyspnea, orthopnea, chills or sweats  He did complain of some right hand swelling  Objective:     Vitals: Blood pressure 104/66, pulse 88, temperature 98 4 °F (36 9 °C), resp  rate 20, height 6' (1 829 m), weight 134 kg (295 lb 3 1 oz), SpO2 97 %  ,Body mass index is 40 04 kg/m²  Temp (24hrs), Av 3 °F (36 8 °C), Min:98 1 °F (36 7 °C), Max:98 6 °F (37 °C)      Weight (last 2 days) Date/Time   Weight    06/07/21 0600   134 (295 2)    06/06/21 0600   133 (294 2)    06/05/21 0600   132 (291 89)                     Intake/Output Summary (Last 24 hours) at 6/7/2021 1146  Last data filed at 6/7/2021 1001  Gross per 24 hour   Intake 442 ml   Output 2217 ml   Net -1775 ml     I/O last 24 hours: In: 26 [P  O :682]  Out: 7275 [Urine:2775;  Chest Tube:42]        Physical Exam    /66   Pulse 88   Temp 98 4 °F (36 9 °C)   Resp 20   Ht 6' (1 829 m) Comment: per pt report  Wt 134 kg (295 lb 3 1 oz)   SpO2 97%   BMI 40 04 kg/m²   Vital signs were reviewed  Constitutional:  The patient was awake, alert, pleasant, cooperative and in no apparent distress  Cardiovascular:  No overt jugular venous distention was noted, S1-S2, no pericardial friction rub or S3 were noted, right hand edema was noted, lower extremities with chronic brawny skin changes and edema noted  Pulmonary:  Adequate inspiratory effort, lungs were clear station percussion with no rales/rhonchi wheezing noted  Abdominal/GI:  Soft, nontender, no rebound or guarding was noted  Skin:  No hives were noted            Invasive Devices     Peripheral Intravenous Line            Peripheral IV 06/05/21 Right;Ventral (anterior) Forearm 1 day          Drain            Chest Tube 2 Right Pleural 28 Fr  4 days                Medications:    Scheduled Meds:  Current Facility-Administered Medications   Medication Dose Route Frequency Provider Last Rate    acetaminophen  650 mg Oral Q6H [facility] Naty Davenport PA-C      allopurinol  100 mg Oral BID Shayne Corral, DO      bisacodyl  10 mg Oral Daily Roosevelt Felix, DO      bisacodyl  10 mg Rectal Daily PRN Babatunde Gong, DO      calcium carbonate  1,000 mg Oral Daily PRN Curly Tran, JAVAD      colchicine  0 6 mg Oral Daily Sussy,       ergocalciferol  50,000 Units Oral Weekly Curly Tran, PA-C      gabapentin  300 mg Oral TID Curly Tran, PA-C      HYDROmorphone  0 2 mg Intravenous Q4H PRN Doll Ficks, JAVAD      insulin lispro  1-6 Units Subcutaneous TID AC Gibson Ayala, DO      lidocaine   Topical Daily PRN Doll Ficks, JAVAD      menthol-methyl salicylate   Apply externally 4x Daily PRN Doll Ficks, JAVAD      metoprolol  2 5 mg Intravenous Q6H PRN Cornelius Paragonjarett Davenport, JAVAD      metoprolol succinate  25 mg Oral BID Komal Holman,       midodrine  10 mg Oral TID  Jessica Chanceks, JAVAD      nicotine  1 patch Transdermal Daily Jessica Robsonks, [place]      ondansetron  4 mg Intravenous Q6H PRN Doll Ficks, JAVAD      oxyCODONE  2 5 mg Oral Q4H PRN Doll Ficks, JAVAD      oxyCODONE  5 mg Oral Q4H PRN Doll Ficks, JAVAD      polyethylene glycol  17 g Oral BID Brady Kelly,       senna-docusate sodium  1 tablet Oral BID Brady Kelly,          PRN Meds: bisacodyl    calcium carbonate    HYDROmorphone    lidocaine    menthol-methyl salicylate    metoprolol    ondansetron    oxyCODONE    oxyCODONE    Continuous Infusions:         LAB RESULTS:      Results from last 7 days   Lab Units 06/07/21  0505 06/06/21  0440 06/05/21  0526 06/04/21  0438 06/03/21  0443 06/02/21  1959/21  1723 06/02/21  1615 06/02/21  1535 06/02/21  1422  06/02/21  0440 06/01/21  0511   WBC Thousand/uL 4 84 6 13 6 41 8 17 7 81 7 26 5 50  --   --   --   --  5 56 5 28   HEMOGLOBIN g/dL 7 0* 8 1* 7 5* 7 8* 7 2* 7 4* 7 2*  --   --   --   --  7 5* 8 0*   I STAT HEMOGLOBIN g/dl  --   --   --   --   --   --   --  7 5* 7 8* 7 8*   < >  --   --    HEMATOCRIT % 22 2* 26 1* 24 6* 25 6* 23 6* 23 8* 23 3*  --   --   --   --  24 0* 25 5*   HEMATOCRIT, ISTAT %  --   --   --   --   --   --   --  22* 23* 23*   < >  --   --    PLATELETS Thousands/uL 241 240 201 217 192 170 168  --   --   --   --  173 190   NEUTROS PCT % 71 75 75 75 90*  --   --   --   --   --   --  82* 76*   LYMPHS PCT % 13* 11* 11* 10* 4*  --   --   --   --   --   --  7* 10*   MONOS PCT % 10 9 10 11 5  --   --   --   -- --   --  10 11   EOS PCT % 4 3 3 2 0  --   --   --   --   --   --  0 2   POTASSIUM mmol/L 4 2 4 2 4 5 4 1 4 6 4 3  --   --   --   --   --  4 0 3 8   CHLORIDE mmol/L 99* 99* 103 102 102 102  --   --   --   --   --  98* 95*   CO2 mmol/L 31 31 30 30 29 29  --   --   --   --   --  30 31   CO2, I-STAT mmol/L  --   --   --   --   --   --   --  29 30 31   < >  --   --    BUN mg/dL 30* 33* 37* 40* 33* 32*  --   --   --   --   --  34* 36*   CREATININE mg/dL 1 27 1 28 1 39* 1 58* 1 46* 1 24  --   --   --   --   --  1 34* 1 44*   CALCIUM mg/dL 8 9 8 8 8 7 9 0 8 6 9 0  --   --   --   --   --  9 3 9 4   ALK PHOS U/L  --  251* 174* 161* 142*  --   --   --   --   --   --  189* 187*   ALT U/L  --  12 9* 13 17  --   --   --   --   --   --  18 18   AST U/L  --  19 15 17 18  --   --   --   --   --   --  18 19   GLUCOSE, ISTAT mg/dl  --   --   --   --   --   --   --  119 108 107  --   --   --    MAGNESIUM mg/dL  --   --   --   --   --  2 4  --   --   --   --   --   --  2 4    < > = values in this interval not displayed  CUTURES:  Lab Results   Component Value Date    BLOODCX No Growth After 5 Days  02/22/2021    BLOODCX No Growth After 5 Days  02/22/2021                 PLEASE NOTE:  This encounter was completed utilizing the [employer]/Fluency Direct Speech Voice Recognition Software  Grammatical errors, random word insertions, pronoun errors and incomplete sentences are occasional consequences of the system due to software limitations, ambient noise and hardware issues  These may be missed by proof reading prior to affixing electronic signature  Any questions or concerns about the content, text or information contained within the body of this dictation should be directly addressed to the physician for clarification  Please do not hesitate to call me directly if you have any any questions or concerns

## 2021-06-07 NOTE — QUICK NOTE
06/07/21    Procedure: Chest tube removal    Right chest tube removed in routine fashion without incident  The patient tolerated the procedure well  A dry, sterile dressing was placed  Will check a PA/lateral chest x-ray         Leonidas Allen MD

## 2021-06-07 NOTE — PROGRESS NOTES
Progress Note - Thoracic Surgery   Antonella Mayer 57 y o  male MRN: [id number]  Unit/Bed#: [facility] 422-01 Encounter: [id number]    Assessment:  64yo M with R pleural effusion, s/p R chest tube placement x2 with incomplete drainage, now s/p R VATS decortication and chest tube placement x2 (6/2/21)    Vitals normal on RA  R CT to waterseal, no air leak, serosanguinous output in atrium  IS 1500    Plan:  D/C R chest tube  CXR after pulling R CT today  PRN analgesia  IS/Pulm toilet  Rest of care per primary    Subjective/Objective     Subjective: Complaining of some pain in R chest near tube, otherwise no complaints, no shortness of breath    Objective:    Blood pressure 113/68, pulse 88, temperature 98 2 °F (36 8 °C), temperature source Axillary, resp  rate 16, height 6' (1 829 m), weight 134 kg (295 lb 3 1 oz), SpO2 95 %  ,Body mass index is 40 04 kg/m²  Intake/Output Summary (Last 24 hours) at 6/7/2021 0617  Last data filed at 6/7/2021 0000  Gross per 24 hour   Intake 682 ml   Output 1905 ml   Net -1223 ml       Invasive Devices     Peripheral Intravenous Line            Peripheral IV 06/05/21 Right;Ventral (anterior) Forearm 1 day          Drain            Chest Tube 2 Right Pleural 28 Fr  4 days                Physical Exam:  Gen:    NAD  CV:      warm, well-perfused  Lungs: No respiratory distress on RA, R CT to water seal, no air leak, serosanguinous output  Abd:     soft, NT/ND  Ext:      no CCE  Neuro: A&Ox3       Lab, Imaging and other studies:I have personally reviewed pertinent lab results      VTE Pharmacologic Prophylaxis: Sequential compression device (Venodyne)   VTE Mechanical Prophylaxis: sequential compression device

## 2021-06-07 NOTE — QUICK NOTE
Attempted to call the patient's wife, but there was no answer  Message left  Will try to call again tomorrow      Karis Ding DO, Luite Juancarlos 86  PGY-1, Internal Medicine  [facility]

## 2021-06-07 NOTE — PROGRESS NOTES
Cardiology Progress Note - Lizette Kil 57 y o  male MRN: [id number]    Unit/Bed#: [facility] 422-01 Encounter: [id number]        Subjective:    No significant events overnight  Feels well  Review of Systems   Cardiovascular: Negative for chest pain, leg swelling and palpitations  Respiratory: Negative for shortness of breath  Objective:   Vitals: Blood pressure 106/68, pulse 96, temperature 98 1 °F (36 7 °C), resp  rate 18, height 6' (1 829 m), weight 134 kg (295 lb 3 1 oz), SpO2 100 %  , Body mass index is 40 04 kg/m² ,   Orthostatic Blood Pressures      Most Recent Value   Blood Pressure  106/68 filed at 2021 0840   Patient Position - Orthostatic VS  Lying filed at 2021 3713         Systolic (21HBK), ABO:859 , Min:99 , VPP:107     Diastolic (83ZRG), SWS:08, Min:61, Max:71      Intake/Output Summary (Last 24 hours) at 2021 0902  Last data filed at 2021 0656  Gross per 24 hour   Intake 442 ml   Output 2617 ml   Net -2175 ml     Weight (last 2 days)     Date/Time   Weight    21 0600   134 (295 2)    21 0600   133 (294 2)    21 0600   132 (291 89)                  Telemetry Review:     Physical Exam  Cardiovascular:      Rate and Rhythm: Tachycardia present  Rhythm irregular  Heart sounds: Normal heart sounds  No murmur  No friction rub  No gallop  Pulmonary:      Breath sounds: Decreased breath sounds present  No wheezing or rales  Musculoskeletal:      Right lower le+ Pitting Edema present  Left lower le+ Pitting Edema present             Laboratory Results:        CBC with diff:   Results from last 7 days   Lab Units 21  0505 21  0440 21  0526 21  0438 21  0443 21  1960 21  1723  21  0440 21  0511   WBC Thousand/uL 4 84 6 13 6 41 8 17 7 81 7 26 5 50  --  5 56 5 28   HEMOGLOBIN g/dL 7 0* 8 1* 7 5* 7 8* 7 2* 7 4* 7 2*  --  7 5* 8 0*   I STAT HEMOGLOBIN   --   --   --   --   --   --   --    < > --   --    HEMATOCRIT % 22 2* 26 1* 24 6* 25 6* 23 6* 23 8* 23 3*  --  24 0* 25 5*   HEMATOCRIT, ISTAT   --   --   --   --   --   --   --    < >  --   --    MCV fL 97 97 98 99* 98 98 98  --  96 97   PLATELETS Thousands/uL 241 240 201 217 192 170 168  --  173 190   MCH pg 30 6 30 2 30 0 30 1 29 9 30 3 30 4  --  29 9 30 4   MCHC g/dL 31 5 31 0* 30 5* 30 5* 30 5* 31 1* 30 9*  --  31 3* 31 4   RDW % 15 5* 15 6* 16 0* 16 6* 15 8* 15 9* 16 0*  --  15 9* 16 4*   MPV fL 10 0 10 3 10 2 10 5 10 1 9 9 9 7  --  10 3 10 4   NRBC AUTO /100 WBCs 0 0 0 0 0  --   --   --  0 0    < > = values in this interval not displayed  CMP:  Results from last 7 days   Lab Units 06/07/21  0505 06/06/21  0440 06/05/21  0526 06/04/21  0438 06/03/21  0443 06/02/21  1959/21  1615 06/02/21  1535 06/02/21  1422  06/02/21  0440 06/01/21  0511   POTASSIUM mmol/L 4 2 4 2 4 5 4 1 4 6 4 3  --   --   --   --  4 0 3 8   CHLORIDE mmol/L 99* 99* 103 102 102 102  --   --   --   --  98* 95*   CO2 mmol/L 31 31 30 30 29 29  --   --   --   --  30 31   CO2, I-STAT mmol/L  --   --   --   --   --   --  29 30 31   < >  --   --    BUN mg/dL 30* 33* 37* 40* 33* 32*  --   --   --   --  34* 36*   CREATININE mg/dL 1 27 1 28 1 39* 1 58* 1 46* 1 24  --   --   --   --  1 34* 1 44*   GLUCOSE, ISTAT mg/dl  --   --   --   --   --   --  119 108 107  --   --   --    CALCIUM mg/dL 8 9 8 8 8 7 9 0 8 6 9 0  --   --   --   --  9 3 9 4   AST U/L  --  19 15 17 18  --   --   --   --   --  18 19   ALT U/L  --  12 9* 13 17  --   --   --   --   --  18 18   ALK PHOS U/L  --  251* 174* 161* 142*  --   --   --   --   --  189* 187*   EGFR ml/min/1 73sq m 60 60 54 46 51 62  --   --   --   --  56 52    < > = values in this interval not displayed           BMP:  Results from last 7 days   Lab Units 06/07/21  0505 06/06/21  0440 06/05/21  0526 06/04/21  0438 06/03/21  0443 06/02/21  1959/21  1615  06/02/21  0440   POTASSIUM mmol/L 4 2 4 2 4 5 4 1 4 6 4 3  --   --  4 0   CHLORIDE mmol/L 99* 99* 103 102 102 102  --   --  98*   CO2 mmol/L 31 31 30 30 29 29  --   --  30   CO2, I-STAT mmol/L  --   --   --   --   --   --  29   < >  --    BUN mg/dL 30* 33* 37* 40* 33* 32*  --   --  34*   CREATININE mg/dL 1 27 1 28 1 39* 1 58* 1 46* 1 24  --   --  1 34*   GLUCOSE, ISTAT mg/dl  --   --   --   --   --   --  119   < >  --    CALCIUM mg/dL 8 9 8 8 8 7 9 0 8 6 9 0  --   --  9 3    < > = values in this interval not displayed  BNP:   Results Reviewed     None        No results for input(s): BNP in the last 72 hours  Magnesium:   Results from last 7 days   Lab Units 21  0511   MAGNESIUM mg/dL 2 4 2 4       Coags:   Results from last 7 days   Lab Units 21  0440   PTT seconds 40*   INR  1 18       TSH:       Lipid Profile:             Cardiac testing:   Results for orders placed during the hospital encounter of 21   Echo complete with contrast if indicated    Narrative [facility] 160[address] [phone]    Transthoracic Echocardiogram  2D, M-mode, Doppler, and Color Doppler    Study date:  2021    Patient: Denise De Leon  MR number: [id number]  Account number: [de-identified]  : 1960  Age: 57 years  Gender: Male  Status: Inpatient  Location: Bedside  Height: 71 in  Weight: 350 lb  BP: 130/ 80 mmHg    Indications: shortness of breath    Diagnoses: 428 0 - CONGESTIVE HEART FAILURE    Sonographer:  Uriel 61, CCT  Primary Physician:  Becky Miranda DO  Referring Physician:  Ragena Olszewski, DO  Group:  [facility]  Interpreting Physician:  Janina Callahan DO    SUMMARY    LEFT VENTRICLE:  Systolic function was normal  Ejection fraction was estimated to be 60 %  There were no regional wall motion abnormalities  Wall thickness was mildly increased  RIGHT VENTRICLE:  The ventricle was moderately dilated  Systolic function was mildly reduced      LEFT ATRIUM:  The atrium was mildly dilated  RIGHT ATRIUM:  The atrium was moderately dilated  MITRAL VALVE:  There was mild regurgitation  TRICUSPID VALVE:  There was moderate regurgitation  Estimated peak PA pressure was 52 mmHg  HISTORY: PRIOR HISTORY: CHF, morbid obesity    PROCEDURE: The procedure was performed at the bedside  This was a routine study  The transthoracic approach was used  The study included complete 2D imaging, M-mode, complete spectral Doppler, and color Doppler  The heart rate was 80 bpm,  at the start of the study  Echocardiographic views were limited due to poor patient compliance and poor acoustic window availability  This was a technically difficult study  LEFT VENTRICLE: Size was normal  Systolic function was normal  Ejection fraction was estimated to be 60 %  There were no regional wall motion abnormalities  Wall thickness was mildly increased  DOPPLER: The study was not technically  sufficient to allow evaluation of LV diastolic function  RIGHT VENTRICLE: The ventricle was moderately dilated  Systolic function was mildly reduced  LEFT ATRIUM: The atrium was mildly dilated  RIGHT ATRIUM: The atrium was moderately dilated  MITRAL VALVE: Valve structure was normal  There was normal leaflet separation  DOPPLER: The transmitral velocity was within the normal range  There was no evidence for stenosis  There was mild regurgitation  AORTIC VALVE: The valve was trileaflet  Leaflets exhibited normal thickness and normal cuspal separation  DOPPLER: Transaortic velocity was within the normal range  There was no evidence for stenosis  There was no regurgitation  TRICUSPID VALVE: The valve structure was normal  There was normal leaflet separation  DOPPLER: The transtricuspid velocity was within the normal range  There was no evidence for stenosis  There was moderate regurgitation  Estimated peak PA  pressure was 52 mmHg      PULMONIC VALVE: Leaflets exhibited normal thickness, no calcification, and normal cuspal separation  DOPPLER: The transpulmonic velocity was within the normal range  There was no regurgitation  PERICARDIUM: There was no pericardial effusion  The pericardium was normal in appearance  AORTA: The root exhibited normal size  SYSTEMIC VEINS: IVC: The inferior vena cava was not well visualized  SYSTEM MEASUREMENT TABLES    2D  %FS: 31 45 %  Ao Diam: 3 22 cm  EDV(Teich): 148 86 ml  EF(Teich): 58 74 %  ESV(Teich): 61 42 ml  IVSd: 1 51 cm  LA Area: 20 63 cm2  LA Diam: 5 65 cm  LVIDd: 5 52 cm  LVIDs: 3 79 cm  LVPWd: 1 08 cm  RA Area: 33 86 cm2  RWT: 0 39  SV(Teich): 87 44 ml    CW  RAP: 0 mmHg  TR Vmax: 3 47 m/s  TR maxP 33 mmHg    PW  E' Sept: 0 09 m/s  E/E' Sept: 13 35  MV A Ti: 0 48 m/s  MV Dec Dillon: 4 5 m/s2  MV DecT: 253 15 ms  MV E Ti: 1 14 m/s  MV E/A Ratio: 2 36  RVSP: 48 33 mmHg    [facility]    Prepared and electronically signed by    Rosario Ponce DO  Signed 2021 08:06:00       No results found for this or any previous visit  No results found for this or any previous visit  No results found for this or any previous visit      Meds/Allergies   Current Facility-Administered Medications   Medication Dose Route Frequency Provider Last Rate    acetaminophen  650 mg Oral Q6H [facility] Koreen Hashimoto, PA-C      bisacodyl  10 mg Oral Daily Melvi Pelt, DO      bisacodyl  10 mg Rectal Daily PRN Racquel Joshi, DO      calcium carbonate  1,000 mg Oral Daily PRN Koreen Hashimoto, PA-C      colchicine  0 6 mg Oral Daily Daxa Meredith, DO      ergocalciferol  50,000 Units Oral Weekly Koreen Hashimoto, PA-C      gabapentin  300 mg Oral TID Koreen Hashimoto, PA-C      HYDROmorphone  0 2 mg Intravenous Q4H PRN Magdalena Davenport PA-C      insulin lispro  1-6 Units Subcutaneous TID SARA Velazco, DO      lidocaine   Topical Daily PRN Koreen Hashimoto, PA-C      menthol-methyl salicylate   Apply externally 4x Daily PRN Manuelito Gordon PA-C      metoprolol  2 5 mg Intravenous Q6H PRN Rafa Davenport PA-C      metoprolol succinate  25 mg Oral BID Valerie Lloyd DO      midodrine  10 mg Oral TID AC Maneulito HeidiJAVAD denton      nicotine  1 patch Transdermal Daily Manuelito [place], [place]      ondansetron  4 mg Intravenous Q6H PRN Manuelito HeidiJAVAD denton      oxyCODONE  2 5 mg Oral Q4H PRN Manuelito HeidiJAVAD denton      oxyCODONE  5 mg Oral Q4H PRN Manuelito Gordon PA-C      polyethylene glycol  17 g Oral BID [facility]       senna-docusate sodium  1 tablet Oral BID [facility]          Medications Prior to Admission   Medication    carvedilol (COREG) 6 25 mg tablet    docusate sodium (COLACE) 100 mg capsule    ergocalciferol (ERGOCALCIFEROL) 1 25 MG (69528 UT) capsule    ferrous sulfate 324 (65 Fe) mg    furosemide (LASIX) 80 mg tablet    glucose blood test strip    Lancets (freestyle) lancets    lisinopril (ZESTRIL) 5 mg tablet    metolazone (ZAROXOLYN) 5 mg tablet    omeprazole (PriLOSEC) 20 mg delayed release capsule    polyethylene glycol (MIRALAX) 17 g packet    potassium chloride (K-DUR,KLOR-CON) 20 mEq tablet    warfarin (COUMADIN) 5 mg tablet       Assessment:  Principal Problem:    Pleural effusion on right  Active Problems:    Atrial fibrillation (HCC)    Chronic diastolic (congestive) heart failure (HCC)    Type 2 diabetes mellitus with diabetic chronic kidney disease (HCC)    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 ascites (HCC)    CKD (chronic kidney disease) stage 3, GFR 30-59 ml/min (HCC)    Chronic anemia    Acute kidney injury superimposed on CKD (HCC)    Hypotension    Right wrist pain      Impression:  1  Recurrent R pleural effusion (possibly due to hepatic cirrhosis) s/p VATS decortication 6/2 - CT to be done today  2  Anemia   3  Persistent atrial fibrillation - improved rate control    4  Chronic diastolic heart failure - have been holding diuretics due to hypotension  Plan:  1  Continue current medications  2  Continue oral diuretics  3  Anticoagulation when safe from Thoracic surgical standpoint  4  Continue remainder of medications

## 2021-06-07 NOTE — OCCUPATIONAL THERAPY NOTE
Occupational Therapy Screen        Patient Name: Gorge Abdalla  [id number] Date: 6/7/2021 06/07/21 0820   OT Last Visit   OT Visit Date 06/07/21   Note Type   Note type Re-Evaluation   Cancel Reasons Other       OT orders received, chart reviewed  Noted that pt is currently ambulating independently in room  Spoke with pt who reports no concerns about returning home at this time  Upon initial OT eval, recommended pt to return home with increased family support  OT will sign off at this time as there are no acute OT needs  Please re-consult as appropriate  Thank you       LOGAN Blue, OTR/L

## 2021-06-07 NOTE — UTILIZATION REVIEW
Continued Stay Review    Date: 06/05/2021                         Current Patient Class: Inpatient  Current Level of Care: Med/Surg    HPI:61 y o  male initially admitted on 05/26/2021  R pleural effusion, s/p R chest tube placement x2 with incomplete drainage,   now s/p R VATS decortication and chest tube placement x2 (6/2/21)     Assessment/Plan:   06/05/2021  Maintain chest tube #1,2 to water seal   Follow up chest X in AM   Continue pulmonary toilet    06/04/2021  Maintain chest tubes to suction (-20cm)  + air leak  Aggressive pulmonary toilet  Transfuse 1 unit  PRBCs, recheck Hgl  Analgesia PRN  06/03/2021  POD #1,  S/p right VATS  Maintain chest tubes #1,2 to suction _2- cm  Wean supplemental O2 as able  Diet as tolerated  Pulmonary toilet, IS  Ambulate OOB  Analgesia PRN  06/02/2021  SURGERY DATE: 6/2/2021  Procedure(s) (LRB):  THORACOSCOPY VIDEO ASSISTED SURGERY (VATS) (Right)  DECORTICATION LUNG (Right), total  Anesthesia Type: General  Operative Findings:  Large amount of retained hemothorax   Densely consolidated lower lobe      Vital Signs: /56   Pulse 72   Temp 98 °F (36 7 °C)   Resp 18   Ht 6' (1 829 m) Comment: per pt report  Wt 134 kg (295 lb 3 1 oz)   SpO2 97%   BMI 40 04 kg/m²       Pertinent Labs/Diagnostic Results:    Results from last 7 days   Lab Units 06/07/21  1407 06/07/21  0505 06/06/21  0440 06/05/21  0526 06/04/21  0438   WBC Thousand/uL  --  4 84 6 13 6 41 8 17   HEMOGLOBIN g/dL 8 8* 7 0* 8 1* 7 5* 7 8*   HEMATOCRIT % 28 2* 22 2* 26 1* 24 6* 25 6*   PLATELETS Thousands/uL  --  241 240 201 217   NEUTROS ABS Thousands/µL  --  3 45 4 63 4 89 6 24     Results from last 7 days   Lab Units 06/07/21  0505 06/06/21  0440 06/05/21  0526 06/04/21  0438 06/03/21  0443 06/02/21  1959/21  1615 06/02/21  1535 06/02/21  1422  06/01/21  0511   SODIUM mmol/L 135* 133* 137 136 136 135*  --   --   --    < > 130*   POTASSIUM mmol/L 4 2 4 2 4 5 4 1 4 6 4 3  --   --   -- < > 3 8   CHLORIDE mmol/L 99* 99* 103 102 102 102  --   --   --    < > 95*   CO2 mmol/L 31 31 30 30 29 29  --   --   --    < > 31   CO2, I-STAT mmol/L  --   --   --   --   --   --  29 30 31   < >  --    ANION GAP mmol/L 5 3* 4 4 5 4  --   --   --    < > 4   BUN mg/dL 30* 33* 37* 40* 33* 32*  --   --   --    < > 36*   CREATININE mg/dL 1 27 1 28 1 39* 1 58* 1 46* 1 24  --   --   --    < > 1 44*   EGFR ml/min/1 73sq m 60 60 54 46 51 62  --   --   --    < > 52   CALCIUM mg/dL 8 9 8 8 8 7 9 0 8 6 9 0  --   --   --    < > 9 4   CALCIUM, IONIZED, ISTAT mmol/L  --   --   --   --   --   --  1 18 1 18 1 16  --   --    MAGNESIUM mg/dL  --   --   --   --   --  2 4  --   --   --   --  2 4    < > = values in this interval not displayed       Results from last 7 days   Lab Units 06/06/21  0440 06/05/21  0526 06/04/21  0438 06/03/21  0443 06/02/21  0440   AST U/L 19 15 17 18 18   ALT U/L 12 9* 13 17 18   ALK PHOS U/L 251* 174* 161* 142* 189*   TOTAL PROTEIN g/dL 7 3 6 7 7 8 6 9 7 5   ALBUMIN g/dL 2 7* 2 5* 2 8* 2 6* 2 7*   TOTAL BILIRUBIN mg/dL 0 58 0 67 0 56 0 46 0 81     Results from last 7 days   Lab Units 06/07/21  1605 06/07/21  1110 06/07/21  0544 06/06/21 2035 06/06/21  1616 06/06/21  1045 06/06/21  0541 06/05/21 2031 06/05/21  1604 06/05/21  1054 06/05/21  0537 06/04/21  2045   POC GLUCOSE mg/dl 91 146* 102 130 126 171* 116 154* 135 107 111 150*     Results from last 7 days   Lab Units 06/07/21  0505 06/06/21  0440 06/05/21  0526 06/04/21  0438 06/03/21  0443 06/02/21  1959/21  0440 06/01/21  0511   GLUCOSE RANDOM mg/dL 108 102 94 101 181* 151* 132 121     Results from last 7 days   Lab Units 06/02/21  1615 06/02/21  1535 06/02/21  1422   I STAT BASE EXC mmol/L 4* 5* 6*   I STAT O2 SAT % 99* 99* 100*   ISTAT PH ART  7 465* 7 466* 7 465*   I STAT ART PCO2 mm HG 39 1 40 1 42 0   I STAT ART PO2 mm  0 118 0 243 0*   I STAT ART HCO3 mmol/L 28 1* 28 9* 30 2*     Results from last 7 days   Lab Units 06/02/21 0440 PROTIME seconds 15 0*   INR  1 18   PTT seconds 40*     Results from last 7 days   Lab Units 06/02/21  1436   GRAM STAIN RESULT  No Polys or Bacteria seen     Medications:   Scheduled Medications:  acetaminophen, 650 mg, Oral, Q6H GERRY  allopurinol, 100 mg, Oral, BID  bisacodyl, 10 mg, Oral, Daily  bumetanide, 1 mg, Oral, BID  colchicine, 0 6 mg, Oral, Daily  ergocalciferol, 50,000 Units, Oral, Weekly  gabapentin, 300 mg, Oral, TID  insulin lispro, 1-6 Units, Subcutaneous, TID AC  metoprolol succinate, 25 mg, Oral, BID  midodrine, 10 mg, Oral, TID AC  nicotine, 1 patch, Transdermal, Daily  polyethylene glycol, 17 g, Oral, BID  senna-docusate sodium, 1 tablet, Oral, BID      Continuous IV Infusions:     PRN Meds:  bisacodyl, 10 mg, Rectal, Daily PRN  calcium carbonate, 1,000 mg, Oral, Daily PRN  HYDROmorphone, 0 2 mg, Intravenous, Q4H PRN  lidocaine, , Topical, Daily PRN  menthol-methyl salicylate, , Apply externally, 4x Daily PRN  metoprolol, 2 5 mg, Intravenous, Q6H PRN  ondansetron, 4 mg, Intravenous, Q6H PRN  oxyCODONE, 2 5 mg, Oral, Q4H PRN  oxyCODONE, 5 mg, Oral, Q4H PRN        Discharge Plan: [facility]    Network Utilization Review Department  ATTENTION: Please call with any questions or concerns to [phone] and carefully listen to the prompts so that you are directed to the right person  All voicemails are confidential   Sapna Reyna all requests for admission clinical reviews, approved or denied determinations and any other requests to dedicated fax number below belonging to the campus where the patient is receiving treatment   List of dedicated fax numbers for the Facilities:  [address] DENIALS (Administrative/Medical Necessity) [phone]   [address] (Maternity/NICU/Pediatrics) [phone]   [address] [facility] [phone]   501 [facility] [id number] [place] [address] [facility] 1481 [address] [phone] 4601 [facility] [phone]

## 2021-06-08 NOTE — PLAN OF CARE
Problem: PHYSICAL THERAPY ADULT  Goal: Performs mobility at highest level of function for planned discharge setting  See evaluation for individualized goals  Description: Treatment/Interventions: Functional transfer training, LE strengthening/ROM, Elevations, Therapeutic exercise, Endurance training, Bed mobility, Gait training, Spoke to nursing, Spoke to case management, OT          See flowsheet documentation for full assessment, interventions and recommendations  Note: Prognosis: Good  Problem List: Decreased strength, Decreased endurance, Decreased mobility, Impaired balance, Pain  Assessment: Pt seen for session for setup, transfers, gait training w/ rest time, repositioning  Pt cooperative, willing to mobilize  Needs less assist for all mobility tasks, and continue to note some pain, fatigue, weakness overall  continues to progress, and continue to recommend d/c home w/ no therapy needs when stable  PT will sign off at this time, may continue to mobilize w/ P4 restorative while here  PT Discharge Recommendation: No rehabilitation needs     PT - OK to Discharge: Yes    See flowsheet documentation for full assessment

## 2021-06-08 NOTE — PROGRESS NOTES
INTERNAL MEDICINE RESIDENCY PROGRESS NOTE     Name: Janie Remy   Age & Sex: 57 y o  male   MRN: [id number]  Unit/Bed#: [address]01   Encounter: [id number]  Team: SOD Team C     PATIENT INFORMATION     Name: Janie Remy   Age & Sex: 57 y o  male   MRN: [id number]  Hospital Stay Days: 15    ASSESSMENT/PLAN     Principal Problem:    Pleural effusion on right  Active Problems:    Acute kidney injury superimposed on CKD [facility])    Atrial fibrillation (HCC)    Chronic diastolic (congestive) heart failure (HCC)    Type 2 diabetes mellitus with diabetic chronic kidney disease (HCC)    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 ascites (HCC)    CKD (chronic kidney disease) stage 3, GFR 30-59 ml/min (HCC)    Chronic anemia    Hypotension    Right wrist pain      * Pleural effusion on right  Assessment & Plan  Patient transferred to [place] from [facility] for thoracic surgery evaluation  History of recurrent right-sided pleural effusion s/p thoracentesis on 05/22, chest tube placed with continued output of bloody fluid  Fluid-exudative but negative for malignancy, cultures negative      Plan:  · Repeat chest x-ray on 05/24 with continued effusion  · CT scan performed on 05/26 revealed pleural effusion improvement with new hyperdensities consistent with lung hematomas  · Consulted thoracic surgery for evaluation  · Management per thoracic surgery  · S/p tpa/DNAse on 5/29 with good output  · CT of the chest on 05/30 revealed a small loculated right hydropneumothorax and benign rounded atelectasis  · Patient is now status post VATS with decortication on the right on 06/02, follow thoracic surgery recommendations:  Last chest tube to be removed on 06/07, will continue to hold [facility] until hemoglobin proved stable  · IS, pulmonary toilet  · Pain control  · Bowel regimen in place, altered per patient's recommendations as to what helps him at home    Acute kidney injury superimposed on CKD [facility])  Assessment & Plan  Likely secondary to episodes Pt  at bedside reports \"pt became weak this afternoon and couldn't get out of bed\". \"She was fine yesterday and is not confused\".   of hypotension with anemia vs cardiorenal     Plan:  · Nephrology following, appreciate reocmmendations  · Baseline Cr 1 5-1 8, admitted with Cr of 2 8 on 5/21/2021  · Cr improved to baseline  · Patient continued on midodrine  · Continue holding home med Lisinopril 5mg for now  · Ultimately will require outpatient follow up with pt's nephrologist Dr Kan Garcia    Atrial fibrillation [facility])  Assessment & Plan  HR elevated due to resp status  History of Afib  Plan:  · Previously on On Coreg 6 25mg BID, increased to 12 5 twice daily that he was switched to metoprolol 25 mg BID due to pressure effects of Coreg  · Chronic anticoagulation with Coumadin 5 milligrams Sunday, Tuesday, Wednesday, Friday, Saturday and 2 5 milligrams Monday and Thursday  · Coumadin currently on hold given anemia and bloody output from chest tube  · Cardiology following  · [id number] 3    Right wrist pain  Assessment & Plan  Patient with pain in his wrist after waking up on 05/31  Has been 10/10 at times  X-ray negative for osseous abnormalities  Wrist splint with minimal relief  Patient unable to receive NSAIDs in the setting of renal disease  Plan:  · S/p Trial 1 time dose of prednisone 40 mg  · Per Nephrology can use daily colchicine  · Colchicine 0 6mg daily ordered, allopurinol 100 mg BID  · Continue monitor, patient has been improving    Hypotension  Assessment & Plan  In the setting of output from chest tube plus Afib/CHF medications (Coreg 6 25 BID -> metoprolol 25 mg BID, Lasix 80mg BID - bumex 1 mg BID)    Plan:  · Midodrine increased to 10mg TID    Chronic anemia  Assessment & Plan  With baseline hemoglobin approximately 9 5  Now acute on chronic d/t blood loss      Plan:  · Patient received 1 unit of PRBC on 05/25/2021 due to bloody chest tube drainage and hematoma  · Stool occult neg  · Given another 1 U PRBC on 5/27/2021  · Additional unit of packed red blood cells given on 05/31  · Hemoglobin down to 7 2 on 06/03 s/p 1 unit PRBC  · Hemoglobin down to 7 0 on 6/7, s/p 1 unit PRBC   · Continue to monitor, hemoglobin 7 6 after chest tube removal, may consider restarting systemic anticoagulation tomorrow    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 ascites [facility])  Assessment & Plan  Last seen by outpatient GI on 04/08  [address]  screening is up-to-date  Last EGD did not show any esophageal varices  Plan:  · Suspect fatty liver vs cardiac in nature  · MELD of 18, Child Malloy class B  · Elastography revealed a Metavir score of F3 indicating severe fibrosis, steatosis S3 (greater than 67%)  · GI now recommending outpatient transjugular liver biopsy along with portal pressure measurements by IR    Type 2 diabetes mellitus with diabetic chronic kidney disease [facility])  Assessment & Plan  Lab Results   Component Value Date    HGBA1C 5 5 05/21/2021     Plan:   · Controlled hemoglobin A1c  Not on any home meds  · Patient with a point of care blood glucose 316 on 6/3, initiated sliding scale insulin algorithm 3    Chronic diastolic (congestive) heart failure (HCC)  Assessment & Plan  Wt Readings from Last 3 Encounters:   06/08/21 135 kg (296 lb 8 3 oz)   05/26/21 131 kg (288 lb 12 8 oz)   05/22/21 (!) 136 kg (300 lb 0 7 oz)   Followed by Dr Darryl Bowling OP  Home regimen: lasix 80 mg BID, metolazone 5 mg twice a week  Plan:   · Bumex 1 milligram twice daily initiated on 06/04  Discontinued on 06/06, restarted on 06/07  · Weight has remained stable   · Continue intake and output and daily weights  · Cardiology and Nephrology following, appreciate recommendations        Disposition:  Patient with his last chest tube out yesterday  Will hold off on starting DVT prophylaxis or systemic anticoagulation given the patient's hemoglobin  Possible discharge in next 1-2 days after anticoagulation restarted  SUBJECTIVE     Patient seen and examined  No acute events overnight  Patient's last chest tube was removed yesterday  Chest x-ray unrevealing afterwards    Thoracic surgery has signed off  Hemoglobin up to 8 8 yesterday after 1 unit packed red blood cells  Down to 7 6 this morning  Will hold off on starting DVT prophylaxis or systemic anticoagulation  Denies hearing/vision changes, fevers, chills, night sweats, dysphasia, shortness breath, chest pain, abdominal pain, nausea, vomiting, diarrhea, constipation, and dysuria  Patient had a large bowel movement yesterday and is no longer constipated  OBJECTIVE     Vitals:    21 0241 21 0600 21 0656 21 1028   BP: 99/54  117/97 94/59   BP Location:       Pulse: 79  87 101   Resp: 18  18 20   Temp: 98 3 °F (36 8 °C)  98 7 °F (37 1 °C) 98 1 °F (36 7 °C)   TempSrc:       SpO2: 92%  97% 100%   Weight:  135 kg (296 lb 8 3 oz)     Height:          Temperature:   Temp (24hrs), Av 3 °F (36 8 °C), Min:98 °F (36 7 °C), Max:98 7 °F (37 1 °C)    Temperature: 98 1 °F (36 7 °C)  Intake & Output:  I/O        07 -  07 07 -  07 07 -  0700    P  O  682 1242     Blood  350     Total Intake(mL/kg) 682 (5 1) 1592 (11 9)     Urine (mL/kg/hr) 2575 (0 8) 1575 (0 5)     Stool  0     Chest Tube 42      Total Output 2617 1575     Net -1935 +17            Unmeasured Stool Occurrence  2 x         Weights:   IBW (Ideal Body Weight): 77 6 kg    Body mass index is 40 22 kg/m²  Weight (last 2 days)     Date/Time   Weight    21 06   135 (296 52)    21 0600   134 (295 2)    21 0600   133 (294 2)            Physical Exam  Vitals signs reviewed  Constitutional:       General: He is not in acute distress  HENT:      Head: Normocephalic and atraumatic  Right Ear: External ear normal       Left Ear: External ear normal       Nose: Nose normal       Mouth/Throat:      Mouth: Mucous membranes are moist       Pharynx: Oropharynx is clear  Eyes:      Extraocular Movements: Extraocular movements intact  Pupils: Pupils are equal, round, and reactive to light     Neck: Musculoskeletal: Normal range of motion  Cardiovascular:      Rate and Rhythm: Normal rate  Rhythm irregular  Pulses: Normal pulses  Heart sounds: No murmur  Pulmonary:      Effort: Pulmonary effort is normal       Comments: Improved aeration on the right side, still with some coarse breath sounds  Abdominal:      General: There is no distension  Tenderness: There is no abdominal tenderness  Musculoskeletal:      Right lower leg: Edema (2+ chronic) present  Left lower leg: Edema (2+ chronic) present  Skin:     Capillary Refill: Capillary refill takes less than 2 seconds  Comments: Chronic venous stasis changes in bilateral lower extremities   Neurological:      General: No focal deficit present  Mental Status: He is alert and oriented to person, place, and time  Psychiatric:         Mood and Affect: Mood normal        LABORATORY DATA     Labs: I have personally reviewed pertinent reports    Results from last 7 days   Lab Units 06/08/21  0439 06/07/21  1407 06/07/21  0505 06/06/21  0440   WBC Thousand/uL 4 80  --  4 84 6 13   HEMOGLOBIN g/dL 7 6* 8 8* 7 0* 8 1*   HEMATOCRIT % 24 7* 28 2* 22 2* 26 1*   PLATELETS Thousands/uL 248  --  241 240   NEUTROS PCT % 70  --  71 75   MONOS PCT % 12  --  10 9      Results from last 7 days   Lab Units 06/08/21  0439 06/07/21  0505 06/06/21  0440 06/05/21  0526  06/02/21  1615 06/02/21  1535 06/02/21  1422   POTASSIUM mmol/L 4 3 4 2 4 2 4 5   < >  --   --   --    CHLORIDE mmol/L 101 99* 99* 103   < >  --   --   --    CO2 mmol/L 30 31 31 30   < >  --   --   --    CO2, I-STAT mmol/L  --   --   --   --   --  29 30 31   BUN mg/dL 27* 30* 33* 37*   < >  --   --   --    CREATININE mg/dL 1 27 1 27 1 28 1 39*   < >  --   --   --    CALCIUM mg/dL 8 6 8 9 8 8 8 7   < >  --   --   --    ALK PHOS U/L 258*  --  251* 174*   < >  --   --   --    ALT U/L 14  --  12 9*   < >  --   --   --    AST U/L 22  --  19 15   < >  --   --   --    GLUCOSE, ISTAT mg/dl --   --   --   --   --  119 108 107    < > = values in this interval not displayed  Results from last 7 days   Lab Units 06/02/21  1959   MAGNESIUM mg/dL 2 4          Results from last 7 days   Lab Units 06/08/21  1025 06/02/21  0440   INR  1 11 1 18   PTT seconds  --  40*               IMAGING & DIAGNOSTIC TESTING     Radiology Results: I have personally reviewed pertinent reports  Xr Wrist 2 Vw Right    Result Date: 5/31/2021  Impression: No acute osseous abnormality  Workstation performed: [id number]     Ct Chest Wo Contrast    Result Date: 5/30/2021  Impression: Pigtail catheter in right posterior costophrenic sulcus with small loculated right hydropneumothorax, possibly ex vacuo  Masslike opacities in the right middle and right lower lobe due to benign rounded atelectasis  Healed/healing fractures of the anterior right 3rd through 7th ribs, visible on 5/26/2021, new since 2/22/2021  Correlate with recent trauma  Pulmonary artery enlargement which can be seen with pulmonary hypertension  Workstation performed: [id number]     Ct Chest Wo Contrast    Result Date: 5/26/2021  Impression: Improvement in the prior pleural effusion with new hyperdensities consistent with hematomas with pigtail catheter in place  Atelectasis of the middle lobe and right lower lobe unchanged  Workstation performed: [id number]     Ir Chest Tube Check/change/reposition/upsize    Result Date: 5/27/2021  Impression: Impression: 1  Successful placement of a 12 Filipino right chest tube under ultrasound and fluoroscopic guidance  Workstation performed: [id number]     Other Diagnostic Testing: I have personally reviewed pertinent reports      ACTIVE MEDICATIONS     Current Facility-Administered Medications   Medication Dose Route Frequency    acetaminophen (TYLENOL) tablet 650 mg  650 mg Oral Q6H [facility]    allopurinol (ZYLOPRIM) tablet 100 mg  100 mg Oral BID    bisacodyl (DULCOLAX) EC tablet 10 mg  10 mg Oral Daily    bisacodyl (DULCOLAX) rectal suppository 10 mg  10 mg Rectal Daily PRN    bumetanide (BUMEX) tablet 1 mg  1 mg Oral BID    calcium carbonate (TUMS) chewable tablet 1,000 mg  1,000 mg Oral Daily PRN    colchicine (COLCRYS) tablet 0 6 mg  0 6 mg Oral Daily    ergocalciferol (VITAMIN D2) capsule 50,000 Units  50,000 Units Oral Weekly    gabapentin (NEURONTIN) capsule 300 mg  300 mg Oral TID    HYDROmorphone HCl (DILAUDID) injection 0 2 mg  0 2 mg Intravenous Q4H PRN    insulin lispro (HumaLOG) 100 units/mL subcutaneous injection 1-6 Units  1-6 Units Subcutaneous TID AC    lidocaine (LMX) 4 % cream   Topical Daily PRN    menthol-methyl salicylate (BENGAY) 77-32 % cream   Apply externally 4x Daily PRN    metoprolol (LOPRESSOR) injection 2 5 mg  2 5 mg Intravenous Q6H PRN    metoprolol succinate (TOPROL-XL) 24 hr tablet 25 mg  25 mg Oral BID    midodrine (PROAMATINE) tablet 10 mg  10 mg Oral TID AC    nicotine (NICODERM CQ) 14 mg/24hr TD 24 hr patch 1 patch  1 patch Transdermal Daily    ondansetron (ZOFRAN) injection 4 mg  4 mg Intravenous Q6H PRN    oxyCODONE (ROXICODONE) IR tablet 2 5 mg  2 5 mg Oral Q4H PRN    oxyCODONE (ROXICODONE) IR tablet 5 mg  5 mg Oral Q4H PRN    polyethylene glycol (MIRALAX) packet 17 g  17 g Oral BID    senna-docusate sodium (SENOKOT S) 8 6-50 mg per tablet 1 tablet  1 tablet Oral BID    warfarin (COUMADIN) tablet 5 mg  5 mg Oral Daily (warfarin)       VTE Pharmacologic Prophylaxis: Reason for no pharmacologic prophylaxis Anemia  VTE Mechanical Prophylaxis: sequential compression device    Portions of the record may have been created with voice recognition software  Occasional wrong word or "sound a like" substitutions may have occurred due to the inherent limitations of voice recognition software    Read the chart carefully and recognize, using context, where substitutions have occurred   ==  Alejandro Ventura, [address] [facility]  Internal Medicine Residency PGY-1

## 2021-06-08 NOTE — PHYSICAL THERAPY NOTE
Physical Therapy Treatment Note       06/08/21 0850   PT Last Visit   PT Visit Date 06/08/21   Note Type   Note Type Treatment   Pain Assessment   Pain Assessment Tool 0-10   Pain Score No Pain   Restrictions/Precautions   Weight Bearing Precautions Per Order No   Other Precautions Fall Risk;Pain   General   Chart Reviewed Yes   Family/Caregiver Present No   Cognition   Overall Cognitive Status WFL   Arousal/Participation Responsive   Attention Attends with cues to redirect   Orientation Level Oriented X4   Memory Unable to assess   Following Commands Follows one step commands without difficulty   Subjective   Subjective states he feels well   wants to ambulate   Transfers   Sit to Stand 5  Supervision   Additional items Assist x 1   Stand to Sit 5  Supervision   Additional items Assist x 1   Ambulation/Elevation   Gait pattern   (slow, wide NICOLLE)   Gait Assistance 5  Supervision   Additional items Assist x 1   Assistive Device None   Distance 200'x2 w/ 5 min seated rest   time spent for setup, repositioning   Balance   Static Sitting Normal   Dynamic Sitting Good   Static Standing Fair +   Dynamic Standing Fair   Ambulatory Fair   Endurance Deficit   Endurance Deficit Yes   Endurance Deficit Description weakness, fatigue   Activity Tolerance   Activity Tolerance Patient limited by fatigue;Treatment limited secondary to medical complications (Comment)   Nurse Made Aware yes   Assessment   Prognosis Good   Problem List Decreased strength;Decreased endurance;Decreased mobility; Impaired balance;Pain   Assessment Pt seen for session for setup, transfers, gait training w/ rest time, repositioning  Pt cooperative, willing to mobilize  Needs less assist for all mobility tasks, and continue to note some pain, fatigue, weakness overall  continues to progress, and continue to recommend d/c home w/ no therapy needs when stable  PT will sign off at this time, may continue to mobilize w/ P4 restorative while here  Goals   PT Treatment Day 3   Plan   PT Frequency   (d/c PT)   Recommendation   PT Discharge Recommendation No rehabilitation needs   PT - OK to Discharge Yes   AM-PAC Basic Mobility Inpatient   Turning in Bed Without Bedrails 4   Lying on Back to Sitting on Edge of Flat Bed 4   Moving Bed to Chair 4   Standing Up From Chair 4   Walk in Room 3   Climb 3-5 Stairs 3   Basic Mobility Inpatient Raw Score 22   Basic Mobility Standardized Score 47 4   Jocelyne Esparza PT, DPT CSRS

## 2021-06-08 NOTE — PROGRESS NOTES
Cardiology Progress Note - Para Erp 57 y o  male MRN: [id number]    Unit/Bed#: [facility] 422-01 Encounter: [id number]        Subjective:    No significant events overnight  Chest tube removed  Feels well  Review of Systems   Cardiovascular: Negative for chest pain, leg swelling and palpitations  Respiratory: Negative for shortness of breath  Objective:   Vitals: Blood pressure 117/97, pulse 87, temperature 98 7 °F (37 1 °C), resp  rate 18, height 6' (1 829 m), weight 135 kg (296 lb 8 3 oz), SpO2 97 %  , Body mass index is 40 22 kg/m² ,   Orthostatic Blood Pressures      Most Recent Value   Blood Pressure  117/97 filed at 2021 0656   Patient Position - Orthostatic VS  Lying filed at 2021 2265         Systolic (68WWJ), XO , Min:97 , CCM:452     Diastolic (61MFX), DMK:56, Min:42, Max:97      Intake/Output Summary (Last 24 hours) at 2021 0948  Last data filed at 2021 0757  Gross per 24 hour   Intake 1592 ml   Output 1725 ml   Net -133 ml     Weight (last 2 days)     Date/Time   Weight    21 0600   135 (296 52)    21 0600   134 (295 2)    21 0600   133 (294 2)                  Telemetry Review: AFIB      Physical Exam  Cardiovascular:      Rate and Rhythm: Tachycardia present  Rhythm irregular  Heart sounds: Normal heart sounds  No murmur  No friction rub  No gallop  Pulmonary:      Breath sounds: Decreased breath sounds present  No wheezing or rales  Musculoskeletal:      Right lower le+ Pitting Edema present  Left lower le+ Pitting Edema present             Laboratory Results:        CBC with diff:   Results from last 7 days   Lab Units 21  0439 21  1407 21  0505 21  0440 21  0526 21  0438 21  0443 21  1960  21  0440   WBC Thousand/uL 4 80  --  4 84 6 13 6 41 8 17 7 81 7 26   < > 5 56   HEMOGLOBIN g/dL 7 6* 8 8* 7 0* 8 1* 7 5* 7 8* 7 2* 7 4*   < > 7 5*   I STAT HEMOGLOBIN   --   --   -- --   --   --   --   --    < >  --    HEMATOCRIT % 24 7* 28 2* 22 2* 26 1* 24 6* 25 6* 23 6* 23 8*   < > 24 0*   HEMATOCRIT, ISTAT   --   --   --   --   --   --   --   --    < >  --    MCV fL 97  --  97 97 98 99* 98 98   < > 96   PLATELETS Thousands/uL 248  --  241 240 201 217 192 170   < > 173   MCH pg 29 9  --  30 6 30 2 30 0 30 1 29 9 30 3   < > 29 9   MCHC g/dL 30 8*  --  31 5 31 0* 30 5* 30 5* 30 5* 31 1*   < > 31 3*   RDW % 15 3*  --  15 5* 15 6* 16 0* 16 6* 15 8* 15 9*   < > 15 9*   MPV fL 10 2  --  10 0 10 3 10 2 10 5 10 1 9 9   < > 10 3   NRBC AUTO /100 WBCs 0  --  0 0 0 0 0  --   --  0    < > = values in this interval not displayed  CMP:  Results from last 7 days   Lab Units 06/08/21  0439 06/07/21  0505 06/06/21  0440 06/05/21  0526 06/04/21  0438 06/03/21  0443 06/02/21  1960/21  1615 06/02/21  1535 06/02/21  1422  06/02/21  0440   POTASSIUM mmol/L 4 3 4 2 4 2 4 5 4 1 4 6 4 3  --   --   --   --  4 0   CHLORIDE mmol/L 101 99* 99* 103 102 102 102  --   --   --   --  98*   CO2 mmol/L 30 31 31 30 30 29 29  --   --   --   --  30   CO2, I-STAT mmol/L  --   --   --   --   --   --   --  29 30 31   < >  --    BUN mg/dL 27* 30* 33* 37* 40* 33* 32*  --   --   --   --  34*   CREATININE mg/dL 1 27 1 27 1 28 1 39* 1 58* 1 46* 1 24  --   --   --   --  1 34*   GLUCOSE, ISTAT mg/dl  --   --   --   --   --   --   --  119 108 107  --   --    CALCIUM mg/dL 8 6 8 9 8 8 8 7 9 0 8 6 9 0  --   --   --   --  9 3   AST U/L 22  --  19 15 17 18  --   --   --   --   --  18   ALT U/L 14  --  12 9* 13 17  --   --   --   --   --  18   ALK PHOS U/L 258*  --  251* 174* 161* 142*  --   --   --   --   --  189*   EGFR ml/min/1 73sq m 60 60 60 54 46 51 62  --   --   --   --  56    < > = values in this interval not displayed           BMP:  Results from last 7 days   Lab Units 06/08/21  0439 06/07/21  0505 06/06/21  0440 06/05/21  0526 06/04/21  0438 06/03/21  0443 06/02/21  1960/21  1615   POTASSIUM mmol/L 4 3 4 2 4 2 4 5 4 1 4 6 4 3  --    CHLORIDE mmol/L 101 99* 99* 103 102 102 102  --    CO2 mmol/L 30 31 31 30 30 29 29  --    CO2, I-STAT mmol/L  --   --   --   --   --   --   --  29   BUN mg/dL 27* 30* 33* 37* 40* 33* 32*  --    CREATININE mg/dL 1 27 1 27 1 28 1 39* 1 58* 1 46* 1 24  --    GLUCOSE, ISTAT mg/dl  --   --   --   --   --   --   --  119   CALCIUM mg/dL 8 6 8 9 8 8 8 7 9 0 8 6 9 0  --        BNP:   Results Reviewed     None        No results for input(s): BNP in the last 72 hours  Magnesium:   Results from last 7 days   Lab Units 21   MAGNESIUM mg/dL 2 4       Coags:   Results from last 7 days   Lab Units 21  0440   PTT seconds 40*   INR  1 18       TSH:       Lipid Profile:             Cardiac testing:   Results for orders placed during the hospital encounter of 21   Echo complete with contrast if indicated    Narrative [address] [facility] [address] [facility] [address], [place] [address]  [phone]    Transthoracic Echocardiogram  2D, M-mode, Doppler, and Color Doppler    Study date:  2021    Patient: Morgan Kumar  MR number: [id number]  Account number: [de-identified]  : 1960  Age: 57 years  Gender: Male  Status: Inpatient  Location: Bedside  Height: 71 in  Weight: 350 lb  BP: 130/ 80 mmHg    Indications: shortness of breath    Diagnoses: 428 0 - CONGESTIVE HEART FAILURE    Sonographer:  Uriel Montelongo, CCT  Primary Physician:  Perry Alcantar DO  Referring Physician:  Maynor Wallace DO  Group:  [facility]  Interpreting Physician:  Conrad Singh DO    SUMMARY    LEFT VENTRICLE:  Systolic function was normal  Ejection fraction was estimated to be 60 %  There were no regional wall motion abnormalities  Wall thickness was mildly increased  RIGHT VENTRICLE:  The ventricle was moderately dilated  Systolic function was mildly reduced  LEFT ATRIUM:  The atrium was mildly dilated      RIGHT ATRIUM:  The atrium was moderately dilated  MITRAL VALVE:  There was mild regurgitation  TRICUSPID VALVE:  There was moderate regurgitation  Estimated peak PA pressure was 52 mmHg  HISTORY: PRIOR HISTORY: CHF, morbid obesity    PROCEDURE: The procedure was performed at the bedside  This was a routine study  The transthoracic approach was used  The study included complete 2D imaging, M-mode, complete spectral Doppler, and color Doppler  The heart rate was 80 bpm,  at the start of the study  Echocardiographic views were limited due to poor patient compliance and poor acoustic window availability  This was a technically difficult study  LEFT VENTRICLE: Size was normal  Systolic function was normal  Ejection fraction was estimated to be 60 %  There were no regional wall motion abnormalities  Wall thickness was mildly increased  DOPPLER: The study was not technically  sufficient to allow evaluation of LV diastolic function  RIGHT VENTRICLE: The ventricle was moderately dilated  Systolic function was mildly reduced  LEFT ATRIUM: The atrium was mildly dilated  RIGHT ATRIUM: The atrium was moderately dilated  MITRAL VALVE: Valve structure was normal  There was normal leaflet separation  DOPPLER: The transmitral velocity was within the normal range  There was no evidence for stenosis  There was mild regurgitation  AORTIC VALVE: The valve was trileaflet  Leaflets exhibited normal thickness and normal cuspal separation  DOPPLER: Transaortic velocity was within the normal range  There was no evidence for stenosis  There was no regurgitation  TRICUSPID VALVE: The valve structure was normal  There was normal leaflet separation  DOPPLER: The transtricuspid velocity was within the normal range  There was no evidence for stenosis  There was moderate regurgitation  Estimated peak PA  pressure was 52 mmHg  PULMONIC VALVE: Leaflets exhibited normal thickness, no calcification, and normal cuspal separation   DOPPLER: The transpulmonic velocity was within the normal range  There was no regurgitation  PERICARDIUM: There was no pericardial effusion  The pericardium was normal in appearance  AORTA: The root exhibited normal size  SYSTEMIC VEINS: IVC: The inferior vena cava was not well visualized  SYSTEM MEASUREMENT TABLES    2D  %FS: 31 45 %  Ao Diam: 3 22 cm  EDV(Teich): 148 86 ml  EF(Teich): 58 74 %  ESV(Teich): 61 42 ml  IVSd: 1 51 cm  LA Area: 20 63 cm2  LA Diam: 5 65 cm  LVIDd: 5 52 cm  LVIDs: 3 79 cm  LVPWd: 1 08 cm  RA Area: 33 86 cm2  RWT: 0 39  SV(Teich): 87 44 ml    CW  RAP: 0 mmHg  TR Vmax: 3 47 m/s  TR maxP 33 mmHg    PW  E' Sept: 0 09 m/s  E/E' Sept: 13 35  MV A Ti: 0 48 m/s  MV Dec Hunt: 4 5 m/s2  MV DecT: 253 15 ms  MV E Ti: 1 14 m/s  MV E/A Ratio: 2 36  RVSP: 48 33 mmHg    [facility]    Prepared and electronically signed by    Mahin Hare DO  Signed 2021 08:06:00       No results found for this or any previous visit  No results found for this or any previous visit  No results found for this or any previous visit      Meds/Allergies   Current Facility-Administered Medications   Medication Dose Route Frequency Provider Last Rate    acetaminophen  650 mg Oral Q6H [address] Agapito Bronson PA-C      allopurinol  100 mg Oral BID Leslie Perez,       bisacodyl  10 mg Oral Daily Roosevelt Felix DO      bisacodyl  10 mg Rectal Daily PRN Norma Felix DO      bumetanide  1 mg Oral BID Emeterio Toledo MD      calcium carbonate  1,000 mg Oral Daily PRN Agapito Bronson PA-C      colchicine  0 6 mg Oral Daily Aguilar Armando DO      enoxaparin  40 mg Subcutaneous Q12H [address] Aguilar Armando DO      ergocalciferol  50,000 Units Oral Weekly Agapito Bronson PA-C      gabapentin  300 mg Oral TID Agapito Bronson PA-C      HYDROmorphone  0 2 mg Intravenous Q4H PRN Belen Davenport PA-C      insulin lispro  1-6 Units Subcutaneous TID AC Leslie Perez, DO  lidocaine   Topical Daily PRN Ruben Dach, PA-C      menthol-methyl salicylate   Apply externally 4x Daily PRN Ruben Dach, PA-C      metoprolol  2 5 mg Intravenous Q6H PRN Anusha Lorena Ethel, PA-C      metoprolol succinate  25 mg Oral BID Eliana Acre,       midodrine  10 mg Oral TID AC Ruben Dac, PA-C      nicotine  1 patch Transdermal Daily [place], [place]      ondansetron  4 mg Intravenous Q6H PRN Ruben Dach, PA-C      oxyCODONE  2 5 mg Oral Q4H PRN Ruben Dach, PA-C      oxyCODONE  5 mg Oral Q4H PRN Ruben Dach, PA-C      polyethylene glycol  17 g Oral BID Thor Rist, DO      senna-docusate sodium  1 tablet Oral BID Thor Rist, DO          Medications Prior to Admission   Medication    carvedilol (COREG) 6 25 mg tablet    docusate sodium (COLACE) 100 mg capsule    ergocalciferol (ERGOCALCIFEROL) 1 25 MG (42513 UT) capsule    ferrous sulfate 324 (65 Fe) mg    furosemide (LASIX) 80 mg tablet    glucose blood test strip    Lancets (freestyle) lancets    lisinopril (ZESTRIL) 5 mg tablet    metolazone (ZAROXOLYN) 5 mg tablet    omeprazole (PriLOSEC) 20 mg delayed release capsule    polyethylene glycol (MIRALAX) 17 g packet    potassium chloride (K-DUR,KLOR-CON) 20 mEq tablet    warfarin (COUMADIN) 5 mg tablet       Assessment:  Principal Problem:    Pleural effusion on right  Active Problems:    Atrial fibrillation (HCC)    Chronic diastolic (congestive) heart failure (HCC)    Type 2 diabetes mellitus with diabetic chronic kidney disease (HCC)    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 ascites (HCC)    CKD (chronic kidney disease) stage 3, GFR 30-59 ml/min (HCC)    Chronic anemia    Acute kidney injury superimposed on CKD (HCC)    Hypotension    Right wrist pain      Impression:  1  Recurrent R pleural effusion (possibly due to hepatic cirrhosis) s/p VATS decortication 6/2 - CT to be done today  2  Anemia   3   Persistent atrial fibrillation - improved rate control  4  Chronic diastolic heart failure - have been holding diuretics due to hypotension  Plan:  1  Continue current medications  2  Continue oral diuretics  3  Restart anticoagulation  4  Continue remainder of medications  5  Can be discharged from a cardiac standpoint  Follow up with Dr Didi Chowdhury in 1 week

## 2021-06-08 NOTE — PROGRESS NOTES
Progress Note - Thoracic Surgery   Ele Garcia 57 y o  male MRN: [id number]  Unit/Bed#: [facility] 422-01 Encounter: [id number]    Assessment:  62yo M with R pleural effusion, s/p R chest tube placement x2 with incomplete drainage, now s/p R VATS decortication and chest tube placement x2 (6/2/21)  Plan:   Right chest tube removed at bedside yesterday, no changes in post-pull CXR   Okay to resume anticoagulation from thoracic standpoint   Thoracic surgery will sign off, please call with questions/concerns    Subjective/Objective     Subjective:   No acute events overnight  Denies complaints this morning  Objective:    Blood pressure 117/97, pulse 87, temperature 98 7 °F (37 1 °C), resp  rate 18, height 6' (1 829 m), weight 135 kg (296 lb 8 3 oz), SpO2 97 %  ,Body mass index is 40 22 kg/m²        Intake/Output Summary (Last 24 hours) at 6/8/2021 0724  Last data filed at 6/8/2021 0518  Gross per 24 hour   Intake 1592 ml   Output 1575 ml   Net 17 ml       Invasive Devices     Peripheral Intravenous Line            Peripheral IV 06/05/21 Right;Ventral (anterior) Forearm 2 days                Physical Exam:   Gen:  NAD  CV:  warm, well-perfused  Lungs: nl effort on RA  Chest: R chest incisions C/D/I   Abd:  soft, NT/ND  Ext:  BLE edema  Neuro: A&Ox3     Results from last 7 days   Lab Units 06/08/21  0439 06/07/21  1407 06/07/21  0505 06/06/21  0440   WBC Thousand/uL 4 80  --  4 84 6 13   HEMOGLOBIN g/dL 7 6* 8 8* 7 0* 8 1*   HEMATOCRIT % 24 7* 28 2* 22 2* 26 1*   PLATELETS Thousands/uL 248  --  241 240     Results from last 7 days   Lab Units 06/08/21  0439 06/07/21  0505 06/06/21  0440  06/02/21  1615   POTASSIUM mmol/L 4 3 4 2 4 2   < >  --    CHLORIDE mmol/L 101 99* 99*   < >  --    CO2 mmol/L 30 31 31   < >  --    CO2, I-STAT mmol/L  --   --   --   --  29   BUN mg/dL 27* 30* 33*   < >  --    CREATININE mg/dL 1 27 1 27 1 28   < >  --    GLUCOSE, ISTAT mg/dl  --   --   --   --  119   CALCIUM mg/dL 8 6 8 9 8 8   < > --     < > = values in this interval not displayed       Results from last 7 days   Lab Units 06/02/21  0440   INR  1 18   PTT seconds 40*

## 2021-06-09 PROBLEM — N17.9 ACUTE KIDNEY INJURY SUPERIMPOSED ON CKD (HCC): Status: RESOLVED | Noted: 2021-01-01 | Resolved: 2021-01-01

## 2021-06-09 PROBLEM — N18.9 ACUTE KIDNEY INJURY SUPERIMPOSED ON CKD (HCC): Status: RESOLVED | Noted: 2021-01-01 | Resolved: 2021-01-01

## 2021-06-09 PROBLEM — J90 PLEURAL EFFUSION ON RIGHT: Status: RESOLVED | Noted: 2020-08-21 | Resolved: 2021-01-01

## 2021-06-09 NOTE — DISCHARGE INSTR - AVS FIRST PAGE
Please start taking warfarin 2 5 mg every day  You have a follow-up INR appointment on 06/16 at Dr Della Pritchett office at 10:00 a m  Please continue to take your medications as instructed  Please follow-up with thoracic surgery as instructed  Please also follow-up with your PCP, cardiologist, and nephrologist   If you have any worsening or returning symptoms, please report back to the emergency department  It was a pleasure taking care of you  If you have any questions, please reach out to your PCP      Regards,  Jessica Fabian DO, [address] 86  PGY-1, Internal Medicine  [facility]

## 2021-06-09 NOTE — DISCHARGE SUMMARY
INTERNAL MEDICINE RESIDENCY DISCHARGE SUMMARY     Janie Remy   57 y o  male  MRN: [id number]  Room/Bed: [facility] 422/[facility] [address] [place] [address]    Encounter: [id number]    Active Problems:    Atrial fibrillation (HCC)    Chronic diastolic (congestive) heart failure (HCC)    Type 2 diabetes mellitus with diabetic chronic kidney disease (HCC)    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 ascites (HCC)    CKD (chronic kidney disease) stage 3, GFR 30-59 ml/min (HCC)    Chronic anemia    Hypotension    Right wrist pain      * Pleural effusion on right-resolved as of 6/9/2021  Assessment & Plan  Patient transferred to [facility] from [facility] for thoracic surgery evaluation  History of recurrent right-sided pleural effusion s/p thoracentesis on 05/22, chest tube placed with continued output of bloody fluid  Fluid-exudative but negative for malignancy, cultures negative      Plan:  · Repeat chest x-ray on 05/24 with continued effusion  · CT scan performed on 05/26 revealed pleural effusion improvement with new hyperdensities consistent with lung hematomas  · Consulted thoracic surgery for evaluation  · Management per thoracic surgery  · S/p tpa/DNAse on 5/29 with good output  · CT of the chest on 05/30 revealed a small loculated right hydropneumothorax and benign rounded atelectasis  · Patient is now status post VATS with decortication on the right on 06/02, follow thoracic surgery recommendations:  Last chest tube to be removed on 06/07, warfarin 5 mg restarted on 06/08, decreased 2 5 mg on discharge  · IS, pulmonary toilet  · Pain control  · Bowel regimen in place    Acute kidney injury superimposed on CKD (HCC)-resolved as of 6/9/2021  Assessment & Plan  Likely secondary to episodes of hypotension with anemia vs cardiorenal     Plan:  · Nephrology following, appreciate reocmmendations  · Baseline Cr 1 5-1 8, admitted with Cr of 2 8 on 5/21/2021  · Cr improved to baseline  · Patient continued on midodrine  · Continue holding home med Lisinopril 5mg for now  · Ultimately will require outpatient follow up with pt's nephrologist Dr Joellen Montanez    Atrial fibrillation [facility])  Assessment & Plan  HR elevated due to resp status  History of Afib  Plan:  · Previously on On Coreg 6 25mg BID, increased to 12 5 twice daily that he was switched to metoprolol 25 mg BID due to pressure effects of Coreg  · Chronic anticoagulation with Coumadin 5 milligrams Sunday, Tuesday, Wednesday, Friday, Saturday and 2 5 milligrams Monday and Thursday  · Coumadin restarted on 06/08, will continue at 2 5 mg till his INR appointment next week  · Follow-up with cardiology outpatient  · [id number]    Right wrist pain  Assessment & Plan  Patient with pain in his wrist after waking up on 05/31  Has been 10/10 at times  X-ray negative for osseous abnormalities  Wrist splint with minimal relief  Patient unable to receive NSAIDs in the setting of renal disease  Plan:  · S/p Trial 1 time dose of prednisone 40 mg  · Per Nephrology can use daily colchicine  · Colchicine 0 6mg daily ordered, allopurinol 100 mg BID  · Follow-up with PCP outpatient    Hypotension  Assessment & Plan  In the setting of output from chest tube plus Afib/CHF medications (Coreg 6 25 BID -> metoprolol 25 mg BID, Lasix 80mg BID - bumex 1 mg BID)    Plan:  · Midodrine increased to 10mg TID    Chronic anemia  Assessment & Plan  With baseline hemoglobin approximately 9 5  Now acute on chronic d/t blood loss      Plan:  · Patient received 1 unit of PRBC on 05/25/2021 due to bloody chest tube drainage and hematoma  · Stool occult neg  · Given another 1 U PRBC on 5/27/2021  · Additional unit of packed red blood cells given on 05/31  · Hemoglobin down to 7 2 on 06/03 s/p 1 unit PRBC  · Hemoglobin down to 7 0 on 6/7, s/p 1 unit PRBC   · Continue to monitor, hemoglobin 8 2 after chest tube removal, warfarin restarted on 06/08    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 ascites [facility])  Assessment & Plan  Last seen by outpatient GI on 04/08  [facility] [address]  screening is up-to-date  Last EGD did not show any esophageal varices  Plan:  · Suspect fatty liver vs cardiac in nature  · MELD of 18, Child Malloy class B  · Elastography revealed a Metavir score of F3 indicating severe fibrosis, steatosis S3 (greater than 67%)  · GI now recommending outpatient transjugular liver biopsy along with portal pressure measurements by IR    Type 2 diabetes mellitus with diabetic chronic kidney disease [facility])  Assessment & Plan  Lab Results   Component Value Date    HGBA1C 5 5 05/21/2021     Plan:   · Controlled hemoglobin A1c  Not on any home meds  · Patient with a point of care blood glucose 316 on 6/3, initiated sliding scale insulin algorithm 3  · Continue lifestyle modifications at home    Chronic diastolic (congestive) heart failure [facility])  Assessment & Plan  Wt Readings from Last 3 Encounters:   06/09/21 134 kg (296 lb 1 2 oz)   05/26/21 131 kg (288 lb 12 8 oz)   05/22/21 (!) 136 kg (300 lb 0 7 oz)   Followed by Dr Jamal Carson OP  Home regimen: lasix 80 mg BID, metolazone 5 mg twice a week  Plan:   · Bumex 1 milligram twice daily initiated on 06/04  Discontinued on 06/06, restarted on 06/07  · Weight has remained stable   · Continue intake and output and daily weights  · Cardiology and Nephrology following, appreciate recommendations          [address] COURSE   57 y o  male with a PMHX of HTN, combined systolic and diastolic CHF, atrial fibrillation anticoagulated on Coumadin, cirrhosis, CKD stage 3, tobacco abuse, T2DM not on any diabetic medications, lymphedema, obesity who initiially presented to [facility] on 5/22 with abnormal labs (low Hg 7 6 and supratherapeutic INR 4) along with SOB x 1 week  CXR showed R pleural effusion  AHRF requiring 2L NC 2/2 pleural effusion  Received vitamin K and IR was consulted  Transferred to [facility] for IR thoracentesis  2 L of bloody fluid withdrawn on 05/22   Coumadin continued to be held  Patient continued to have bloody drainage from chest tube, that caused acute anemia (Hg 6 8) requiring 1 unit PRBC transfusion on 5/25 and 5/27  Patient had repeat chest CT on 5/26 due to tachycardia and hypotension,  showed smaller size of effusion however suggestive of new hematoma in pleural space  Therefore pulmonology recommended patient to be transferred for thoracic surgery evaluation  Trial of tpa/DNAse by thoracic surgery on 5/29 with good output  Soft BP started on midodrine by nephrology  Also presented with MARISABEL which has been improving  CT chest on 5/30 showed a small loculated right hydropneumothorax and benign rounded atelectasis  S/p R VATS decortication on 06/02  He required 2 units of packed red blood cells post VATS for chronic anemia  Last chest tube discontinued on 6/7  CXR stable  Patient persistently in atrial fibrillation prior to surgery  Without the ability to anticoagulate, was mildly controlled with carvedilol with as needed metoprolol  Also complicated by hypotension, so carvedilol was changed to metoprolol with adequate response  Warfarin was restarted on 6/8, continue on 2 5 mg until follow-up with cardiology  Patient was also followed by GI who suggested trans jugular hepatic biopsy a portal pressure measurements; however, this was delayed secondary to his surgery  Determined that this could be followed up as an outpatient  Elastography performed inpatient that revealed a Metavir F3 indicating severe fibrosis  Nephrology was following for CKD stage 3A  Diuresed delayed until after surgery secondary to hypotension  Patient was chronically hyponatremic, given a dose of Samsca on 06/01 with recovery of sodium  Eventually started on Bumex 1 mg twice daily  Patient was also reporting right wrist pain during admission after sleeping on it awkwardly on 5/31  Wrist x-rays were negative  Patient was experiencing 8 to 10/10 pain    Pain was largely refractory to any analgesia  Patient was put in a wrist splint  Trialed 1 time dose of prednisone 40 mg with relief  Colchicine started  Will need outpatient follow-up  DISCHARGE INFORMATION     PCP at Discharge: Russel Roca DO    Admitting Provider: Bettey Carrel, DO  Admission Date: 5/26/2021    Discharge Provider: No att  providers found  Discharge Date: 6/9/2021    Discharge Disposition: Home/Self Care  Discharge Condition: fair  Discharge with Lines: no    Discharge Diet: cardiac diet  Activity Restrictions: none  Test Results Pending at Discharge: INR, BMP    Discharge Diagnoses:  Principal Problem (Resolved):    Pleural effusion on right  Active Problems:    Atrial fibrillation (HCC)    Chronic diastolic (congestive) heart failure (HCC)    Type 2 diabetes mellitus with diabetic chronic kidney disease ([facility] [address] )    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 ascites (HCC)    CKD (chronic kidney disease) stage 3, GFR 30-59 ml/min (HCC)    Chronic anemia    Hypotension    Right wrist pain  Resolved Problems:    Acute kidney injury superimposed on CKD [facility])      Consulting Providers:  IR  Nephrology  Pulmonology  Thoracic Surgery  Cardiology  GI  PT/OT  CM    Diagnostic & Therapeutic Procedures Performed:  Xr Wrist 2 Vw Right    Result Date: 5/31/2021  Impression: No acute osseous abnormality  Workstation performed: [id number]     Ct Chest Wo Contrast    Result Date: 5/30/2021  Impression: Pigtail catheter in right posterior costophrenic sulcus with small loculated right hydropneumothorax, possibly ex vacuo  Masslike opacities in the right middle and right lower lobe due to benign rounded atelectasis  Healed/healing fractures of the anterior right 3rd through 7th ribs, visible on 5/26/2021, new since 2/22/2021  Correlate with recent trauma  Pulmonary artery enlargement which can be seen with pulmonary hypertension   Workstation performed: [id number]     Ct Chest Wo Contrast    Result Date: 5/26/2021  Impression: Improvement in the prior pleural effusion with new hyperdensities consistent with hematomas with pigtail catheter in place  Atelectasis of the middle lobe and right lower lobe unchanged  Workstation performed: [id number]     Ir Chest Tube Check/change/reposition/upsize    Result Date: 5/27/2021  Impression: Impression: 1  Successful placement of a 12 Ivorian right chest tube under ultrasound and fluoroscopic guidance   Workstation performed: [id number]       Code Status: Level 1 - Full Code  Advance Directive & Living Will: <no information>  Power of :    POLST:      Medications:  Discharge Medication List as of 6/9/2021 12:13 PM      STOP taking these medications       carvedilol (COREG) 6 25 mg tablet Comments:   Reason for Stopping:         furosemide (LASIX) 80 mg tablet Comments:   Reason for Stopping:         lisinopril (ZESTRIL) 5 mg tablet Comments:   Reason for Stopping:         metolazone (ZAROXOLYN) 5 mg tablet Comments:   Reason for Stopping:             Discharge Medication List as of 6/9/2021 12:13 PM      START taking these medications    Details   allopurinol (ZYLOPRIM) 100 mg tablet Take 1 tablet (100 mg total) by mouth 2 (two) times a day, Starting Wed 6/9/2021, Normal      bumetanide (BUMEX) 1 mg tablet Take 1 tablet (1 mg total) by mouth 2 (two) times a day, Starting Wed 6/9/2021, Normal      colchicine (COLCRYS) 0 6 mg tablet Take 1 tablet (0 6 mg total) by mouth daily, Starting Thu 6/10/2021, Normal      gabapentin (NEURONTIN) 300 mg capsule Take 1 capsule (300 mg total) by mouth 3 (three) times a day, Starting Wed 6/9/2021, Normal      metoprolol succinate (TOPROL-XL) 25 mg 24 hr tablet Take 1 tablet (25 mg total) by mouth 2 (two) times a day, Starting Wed 6/9/2021, Normal      midodrine (PROAMATINE) 10 MG tablet Take 1 tablet (10 mg total) by mouth 3 (three) times a day before meals, Starting Wed 6/9/2021, Normal      nicotine (NICODERM CQ) 14 mg/24hr TD 24 hr patch Place 1 patch on the skin daily, Starting u 6/10/2021, Normal           Discharge Medication List as of 6/9/2021 12:13 PM      CONTINUE these medications which have NOT CHANGED    Details   docusate sodium (COLACE) 100 mg capsule Take 1 capsule (100 mg total) by mouth 2 (two) times a day as needed for constipation, Starting u 3/11/2021, No Print      ergocalciferol (ERGOCALCIFEROL) 1 25 MG (32720 UT) capsule Take 1 capsule (50,000 Units total) by mouth once a week, Starting Mon 3/15/2021, Normal      ferrous sulfate 324 (65 Fe) mg Take 1 tablet (324 mg total) by mouth 2 (two) times a day before meals, Starting Thu 4/8/2021, Normal      glucose blood test strip Test glucose once a day , Normal      Lancets (freestyle) lancets Test glucose once a day , Normal      omeprazole (PriLOSEC) 20 mg delayed release capsule Take 1 capsule (20 mg total) by mouth 2 (two) times a day, Starting Thu 3/11/2021, No Print      polyethylene glycol (MIRALAX) 17 g packet Take 17 g by mouth daily as needed (constipation), Starting Thu 3/11/2021, No Print      potassium chloride (K-DUR,KLOR-CON) 20 mEq tablet Take 3 daily, No Print             Allergies:  No Known Allergies    FOLLOW-UP     PCP Outpatient Follow-up:  Follow-up with PCP in 7-14 days    Consulting Providers Follow-up:  Thoracic Surgery 6/24  Cardiology 6/16, 6/30  Nephrology 7/7  GI 9/29  Sleep 4/28    Active Issues Requiring Follow-up:   Hemothorax, HTN, combined systolic and diastolic CHF, atrial fibrillation anticoagulated on Coumadin, cirrhosis, CKD stage 3, tobacco abuse, T2DM not on any diabetic medications, lymphedema, obesity    Discharge Statement:   I spent 45 minutes minutes discharging the patient  This time was spent on the day of discharge  I had direct contact with the patient on the day of discharge  Additional documentation is required if more than 30 minutes were spent on discharge  Portions of the record may have been created with voice recognition software  Occasional wrong word or "sound a like" substitutions may have occurred due to the inherent limitations of voice recognition software    Read the chart carefully and recognize, using context, where substitutions have occurred     ==  Yudith Burrows, [facility]  Internal Medicine Resident PGY-1

## 2021-06-09 NOTE — PROGRESS NOTES
INTERNAL MEDICINE RESIDENCY PROGRESS NOTE     Name: Janie Remy   Age & Sex: 57 y o  male   MRN: [id number]  Unit/Bed#: [facility] 422-01   Encounter: [id number]  Team: SOD Team C     PATIENT INFORMATION     Name: Janie Remy   Age & Sex: 57 y o  male   MRN: [id number]  Hospital Stay Days: 14    ASSESSMENT/PLAN     Active Problems:    Atrial fibrillation ([facility] )    Chronic diastolic (congestive) heart failure (HCC)    Type 2 diabetes mellitus with diabetic chronic kidney disease ([facility] )    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 ascites (HCC)    CKD (chronic kidney disease) stage 3, GFR 30-59 ml/min (HCC)    Chronic anemia    Hypotension    Right wrist pain      * Pleural effusion on right-resolved as of 6/9/2021  Assessment & Plan  Patient transferred to [place] from [facility] for thoracic surgery evaluation  History of recurrent right-sided pleural effusion s/p thoracentesis on 05/22, chest tube placed with continued output of bloody fluid  Fluid-exudative but negative for malignancy, cultures negative      Plan:  · Repeat chest x-ray on 05/24 with continued effusion  · CT scan performed on 05/26 revealed pleural effusion improvement with new hyperdensities consistent with lung hematomas  · Consulted thoracic surgery for evaluation  · Management per thoracic surgery  · S/p tpa/DNAse on 5/29 with good output  · CT of the chest on 05/30 revealed a small loculated right hydropneumothorax and benign rounded atelectasis  · Patient is now status post VATS with decortication on the right on 06/02, follow thoracic surgery recommendations:  Last chest tube to be removed on 06/07, warfarin 5 mg restarted on 06/08, decreased 2 5 mg on discharge  · IS, pulmonary toilet  · Pain control  · Bowel regimen in place    Acute kidney injury superimposed on CKD (HCC)-resolved as of 6/9/2021  Assessment & Plan  Likely secondary to episodes of hypotension with anemia vs cardiorenal     Plan:  · Nephrology following, appreciate reocmmendations  · Baseline Cr 1 5-1 8, admitted with Cr of 2 8 on 5/21/2021  · Cr improved to baseline  · Patient continued on midodrine  · Continue holding home med Lisinopril 5mg for now  · Ultimately will require outpatient follow up with pt's nephrologist Dr Dion Valenzuela    Atrial fibrillation [facility])  Assessment & Plan  HR elevated due to resp status  History of Afib  Plan:  · Previously on On Coreg 6 25mg BID, increased to 12 5 twice daily that he was switched to metoprolol 25 mg BID due to pressure effects of Coreg  · Chronic anticoagulation with Coumadin 5 milligrams Sunday, Tuesday, Wednesday, Friday, Saturday and 2 5 milligrams Monday and Thursday  · Coumadin restarted on 06/08, will continue at 2 5 mg till his INR appointment next week  · Follow-up with cardiology outpatient  · [id number]    Right wrist pain  Assessment & Plan  Patient with pain in his wrist after waking up on 05/31  Has been 10/10 at times  X-ray negative for osseous abnormalities  Wrist splint with minimal relief  Patient unable to receive NSAIDs in the setting of renal disease  Plan:  · S/p Trial 1 time dose of prednisone 40 mg  · Per Nephrology can use daily colchicine  · Colchicine 0 6mg daily ordered, allopurinol 100 mg BID  · Follow-up with PCP outpatient    Hypotension  Assessment & Plan  In the setting of output from chest tube plus Afib/CHF medications (Coreg 6 25 BID -> metoprolol 25 mg BID, Lasix 80mg BID - bumex 1 mg BID)    Plan:  · Midodrine increased to 10mg TID    Chronic anemia  Assessment & Plan  With baseline hemoglobin approximately 9 5  Now acute on chronic d/t blood loss      Plan:  · Patient received 1 unit of PRBC on 05/25/2021 due to bloody chest tube drainage and hematoma  · Stool occult neg  · Given another 1 U PRBC on 5/27/2021  · Additional unit of packed red blood cells given on 05/31  · Hemoglobin down to 7 2 on 06/03 s/p 1 unit PRBC  · Hemoglobin down to 7 0 on 6/7, s/p 1 unit PRBC   · Continue to monitor, hemoglobin 8 2 after chest tube removal, warfarin restarted on 06/08    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 ascites [facility])  Assessment & Plan  Last seen by outpatient GI on 04/08  [facility]  screening is up-to-date  Last EGD did not show any esophageal varices  Plan:  · Suspect fatty liver vs cardiac in nature  · MELD of 18, Child Malloy class B  · Elastography revealed a Metavir score of F3 indicating severe fibrosis, steatosis S3 (greater than 67%)  · GI now recommending outpatient transjugular liver biopsy along with portal pressure measurements by IR    Type 2 diabetes mellitus with diabetic chronic kidney disease [facility])  Assessment & Plan  Lab Results   Component Value Date    HGBA1C 5 5 05/21/2021     Plan:   · Controlled hemoglobin A1c  Not on any home meds  · Patient with a point of care blood glucose 316 on 6/3, initiated sliding scale insulin algorithm 3  · Continue lifestyle modifications at home    Chronic diastolic (congestive) heart failure [facility])  Assessment & Plan  Wt Readings from Last 3 Encounters:   06/09/21 134 kg (296 lb 1 2 oz)   05/26/21 131 kg (288 lb 12 8 oz)   05/22/21 (!) 136 kg (300 lb 0 7 oz)   Followed by Dr Luigi Galdamez OP  Home regimen: lasix 80 mg BID, metolazone 5 mg twice a week  Plan:   · Bumex 1 milligram twice daily initiated on 06/04  Discontinued on 06/06, restarted on 06/07  · Weight has remained stable   · Continue intake and output and daily weights  · Cardiology and Nephrology following, appreciate recommendations        Disposition:  Discharge home today     SUBJECTIVE     Patient seen and examined  No acute events overnight  Patient was sitting up comfortably in his chair  States that he feels very well  Denies hearing/vision changes, fevers, chills, night sweats, dysphagia, chest pain, shortness of breath, abdominal pain, nausea, vomiting, diarrhea, constipation, dysuria  Peripheral edema is at baseline  Patient here to go home today  Hemoglobin up to 8 2 today  Warfarin restarted yesterday  Will continue him at a lower dose at 2 5 mg until an INR visit next week  OBJECTIVE     Vitals:    21 0305 21 0536 21 0656 21 1100   BP: 111/68  114/73 120/91   BP Location:    Left arm   Pulse: 93  89 101   Resp: 16  20 20   Temp: 97 5 °F (36 4 °C)  98 3 °F (36 8 °C) 98 3 °F (36 8 °C)   TempSrc:    Oral   SpO2: 100%  99% 99%   Weight:  134 kg (296 lb 1 2 oz)     Height:          Temperature:   Temp (24hrs), Av 1 °F (36 7 °C), Min:97 5 °F (36 4 °C), Max:99 °F (37 2 °C)    Temperature: 98 3 °F (36 8 °C)  Intake & Output:  I/O        07 -  07 -  07 07 - 06/10 0700    P  O  1242 720     Blood 350      Total Intake(mL/kg) 1592 (11 9) 720 (5 4)     Urine (mL/kg/hr) 1575 (0 5) 1520 (0 5)     Stool 0 0     Chest Tube       Total Output 1575 1520     Net +17 -800            Unmeasured Urine Occurrence  1 x     Unmeasured Stool Occurrence 2 x 3 x         Weights:   IBW (Ideal Body Weight): 77 6 kg    Body mass index is 40 16 kg/m²  Weight (last 2 days)     Date/Time   Weight    21 0536   134 (296 08)    21 0600   135 (296 52)    21 0600   134 (295 2)            Physical Exam  Vitals signs reviewed  Constitutional:       General: He is not in acute distress  Appearance: He is obese  HENT:      Head: Normocephalic and atraumatic  Right Ear: External ear normal       Left Ear: External ear normal       Nose: Nose normal       Mouth/Throat:      Mouth: Mucous membranes are moist       Pharynx: Oropharynx is clear  No oropharyngeal exudate  Eyes:      Extraocular Movements: Extraocular movements intact  Pupils: Pupils are equal, round, and reactive to light  Cardiovascular:      Rate and Rhythm: Normal rate  Rhythm irregular  Heart sounds: No murmur     Pulmonary:      Effort: Pulmonary effort is normal       Comments: Improved aeration on the right side  Abdominal:      General: Bowel sounds are normal  There is no distension  Palpations: Abdomen is soft  Tenderness: There is no abdominal tenderness  Musculoskeletal:      Right lower leg: Edema (1+ chronic) present  Left lower leg: Edema (1+ chronic) present  Skin:     Capillary Refill: Capillary refill takes less than 2 seconds  Comments: Chronic venous stasis changes bilateral lower extremities   Neurological:      General: No focal deficit present  Mental Status: He is oriented to person, place, and time  Psychiatric:         Mood and Affect: Mood normal          Behavior: Behavior normal        LABORATORY DATA     Labs: I have personally reviewed pertinent reports  Results from last 7 days   Lab Units 06/09/21  0451 06/08/21  0439 06/07/21  1407 06/07/21  0505   WBC Thousand/uL 4 93 4 80  --  4 84   HEMOGLOBIN g/dL 8 2* 7 6* 8 8* 7 0*   HEMATOCRIT % 26 5* 24 7* 28 2* 22 2*   PLATELETS Thousands/uL 275 248  --  241   NEUTROS PCT % 71 70  --  71   MONOS PCT % 13* 12  --  10      Results from last 7 days   Lab Units 06/09/21  0451 06/08/21  0439 06/07/21  0505 06/06/21  0440 06/05/21  0526  06/02/21  1615 06/02/21  1535 06/02/21  1422   POTASSIUM mmol/L 4 3 4 3 4 2 4 2 4 5   < >  --   --   --    CHLORIDE mmol/L 100 101 99* 99* 103   < >  --   --   --    CO2 mmol/L 31 30 31 31 30   < >  --   --   --    CO2, I-STAT mmol/L  --   --   --   --   --   --  29 30 31   BUN mg/dL 25 27* 30* 33* 37*   < >  --   --   --    CREATININE mg/dL 1 18 1 27 1 27 1 28 1 39*   < >  --   --   --    CALCIUM mg/dL 9 0 8 6 8 9 8 8 8 7   < >  --   --   --    ALK PHOS U/L  --  258*  --  251* 174*   < >  --   --   --    ALT U/L  --  14  --  12 9*   < >  --   --   --    AST U/L  --  22  --  19 15   < >  --   --   --    GLUCOSE, ISTAT mg/dl  --   --   --   --   --   --  119 108 107    < > = values in this interval not displayed       Results from last 7 days   Lab Units 06/1959   MAGNESIUM mg/dL 2 4          Results from last 7 days   Lab Units 06/09/21  0451 06/08/21  1025   INR  1 11 1 11               IMAGING & DIAGNOSTIC TESTING     Radiology Results: I have personally reviewed pertinent reports  Xr Wrist 2 Vw Right    Result Date: 5/31/2021  Impression: No acute osseous abnormality  Workstation performed: [id number]     Ct Chest Wo Contrast    Result Date: 5/30/2021  Impression: Pigtail catheter in right posterior costophrenic sulcus with small loculated right hydropneumothorax, possibly ex vacuo  Masslike opacities in the right middle and right lower lobe due to benign rounded atelectasis  Healed/healing fractures of the anterior right 3rd through 7th ribs, visible on 5/26/2021, new since 2/22/2021  Correlate with recent trauma  Pulmonary artery enlargement which can be seen with pulmonary hypertension  Workstation performed: [id number]     Ct Chest Wo Contrast    Result Date: 5/26/2021  Impression: Improvement in the prior pleural effusion with new hyperdensities consistent with hematomas with pigtail catheter in place  Atelectasis of the middle lobe and right lower lobe unchanged  Workstation performed: [id number]     Ir Chest Tube Check/change/reposition/upsize    Result Date: 5/27/2021  Impression: Impression: 1  Successful placement of a 12 [place] right chest tube under ultrasound and fluoroscopic guidance  Workstation performed: [id number]     Other Diagnostic Testing: I have personally reviewed pertinent reports      ACTIVE MEDICATIONS     Current Facility-Administered Medications   Medication Dose Route Frequency    acetaminophen (TYLENOL) tablet 650 mg  650 mg Oral Q6H [address]    allopurinol (ZYLOPRIM) tablet 100 mg  100 mg Oral BID    bisacodyl (DULCOLAX) EC tablet 10 mg  10 mg Oral Daily    bisacodyl (DULCOLAX) rectal suppository 10 mg  10 mg Rectal Daily PRN    bumetanide (BUMEX) tablet 1 mg  1 mg Oral BID    calcium carbonate (TUMS) chewable tablet 1,000 mg  1,000 mg Oral Daily PRN    colchicine (COLCRYS) tablet 0 6 mg  0 6 mg Oral Daily    ergocalciferol (VITAMIN D2) capsule 50,000 Units  50,000 Units Oral Weekly    gabapentin (NEURONTIN) capsule 300 mg  300 mg Oral TID    HYDROmorphone HCl (DILAUDID) injection 0 2 mg  0 2 mg Intravenous Q4H PRN    insulin lispro (HumaLOG) 100 units/mL subcutaneous injection 1-6 Units  1-6 Units Subcutaneous TID AC    lidocaine (LMX) 4 % cream   Topical Daily PRN    menthol-methyl salicylate (BENGAY) 53-52 % cream   Apply externally 4x Daily PRN    metoprolol (LOPRESSOR) injection 2 5 mg  2 5 mg Intravenous Q6H PRN    metoprolol succinate (TOPROL-XL) 24 hr tablet 25 mg  25 mg Oral BID    midodrine (PROAMATINE) tablet 10 mg  10 mg Oral TID AC    nicotine (NICODERM CQ) 14 mg/24hr TD 24 hr patch 1 patch  1 patch Transdermal Daily    ondansetron (ZOFRAN) injection 4 mg  4 mg Intravenous Q6H PRN    oxyCODONE (ROXICODONE) IR tablet 2 5 mg  2 5 mg Oral Q4H PRN    oxyCODONE (ROXICODONE) IR tablet 5 mg  5 mg Oral Q4H PRN    polyethylene glycol (MIRALAX) packet 17 g  17 g Oral BID    senna-docusate sodium (SENOKOT S) 8 6-50 mg per tablet 1 tablet  1 tablet Oral BID    warfarin (COUMADIN) tablet 5 mg  5 mg Oral Daily (warfarin)       VTE Pharmacologic Prophylaxis: Warfarin (Coumadin)  VTE Mechanical Prophylaxis: sequential compression device    Portions of the record may have been created with voice recognition software  Occasional wrong word or "sound a like" substitutions may have occurred due to the inherent limitations of voice recognition software    Read the chart carefully and recognize, using context, where substitutions have occurred   ==  Philip Urena, [facility]  Internal Medicine Residency PGY-1

## 2021-06-10 NOTE — UTILIZATION REVIEW
Notification of Discharge   This is a Notification of Discharge from our facility [address]  Please be advised that this patient has been discharge from our facility  Below you will find the admission and discharge date and time including the patients disposition  UTILIZATION REVIEW CONTACT:  Kyler Shelton  Utilization   Network Utilization Review Department  Phone: [phone] x carefully listen to the prompts  All voicemails are confidential   Email: [email or web address]     PHYSICIAN ADVISORY SERVICES:  FOR NNAR-HQ-KODL REVIEW - MEDICAL NECESSITY DENIAL  Phone: [phone]  Fax: [phone]  Email: [email or web address]     PRESENTATION DATE: 5/26/2021  9:30 PM  OBERVATION ADMISSION DATE:   INPATIENT ADMISSION DATE: 5/26/21 2130   DISCHARGE DATE: 6/9/2021 12:55 PM  DISPOSITION: Home/Self Care Home/Self Care      IMPORTANT INFORMATION:  Send all requests for admission clinical reviews, approved or denied determinations and any other requests to dedicated fax number below belonging to the campus where the patient is receiving treatment   List of dedicated fax numbers:  [address] DENIALS (Administrative/Medical Necessity) [phone]   [address] (Maternity/NICU/Pediatrics) [phone]   Thang Caba [phone]   Sofia Parker [phone]   [facility] [phone]   Eduarda Silver [address] [phone]   [facility]  [phone]   2209 [facility], [facility]  2401 [facility] [address] [facility] [phone]

## 2021-06-11 NOTE — TELEPHONE ENCOUNTER
1  Constipation: (Yes: Colace 100 mg BID, Senokot 2 tabs QHS or Senokot S 2 tabs qhs   No: Dulcolax/Miralax/suppository/enema)  denies         2  Pain Management: (Oxycodone 5-10 mg prn, Tylenol 650 mg q6 hrs and +/- Advil 600 mg TID for 7 days  Neurontin 300 mg TID for 21 days)  Using oxycocdone very sparingly  Encouraged addition of Tylenol  Patient unable to afford Gabapentin  3   Fever: (Chills? Call for temp >100 4 )  Denies fever or chills  4   Cough: (Dry, productive?)  Positive cough productive for clear mucus  5   Shortness of breath: (At rest, with activity?)  Not SOB with rest  Patient not very active  Encouraged him to increase activity  6   Appetite: Good        7  Incisions: (Warmth, redness, drainage? May shower, no baths  No creams, lotions, or ointments to incisions)  Patient is unsure  Advised to call for redness, arneth or purulent drainage  8   Ambulation/Incentive Spirometry: (Any activity? Use IS every 15 min) Not using IS but has one  Advised to start using IS every 15 minutes or every commercial   Advised patient to call for temp>100 4, chills, increased SOB or cough    Reviewed post-op appointment date, time and need for CXR 1-3 days prior to appointment

## 2021-06-14 NOTE — PROGRESS NOTES
Outpatient Care Management Note:  Outreach call placed to Alisha Marvin  Introduced myself and my role  He does not feel that outreach is needed at this time  He has scheduled all of his follow up appointments  His incision is healing well with no redness or drainage  Denies any fevers  He is independent with his ADL's and his wife assists as needed  His only concern is the price of his gabapentin  Advised him that a 90 day supply utilizing a [employer] coupon would be around $27 00  That is an acceptable price  He is agreeable to receiving the coupon and my contact information via mail  Encouraged to call with any questions or concerns he may have in the future

## 2021-06-14 NOTE — TELEPHONE ENCOUNTER
Patient called to reschedule appt on 6/24/21 with Dr Dallin Asencio to an earlier time if possible and if it can be between 10-11AM  Patient stated his wife does not want to drive during any other time  Lee Ann Jarrett can be contacted at [phone]

## 2021-06-14 NOTE — PROGRESS NOTES
Brayan [facility]  coding opportunities          Chart reviewed, no opportunity found: CHART REVIEWED, NO OPPORTUNITY FOUND                     Patients insurance company: [facility] [address]  (Medicare Advantage and Commercial)

## 2021-06-16 NOTE — PATIENT INSTRUCTIONS
Pt presented for INR today    Reviewed notes from Dr Barnes Agent decreasing goal INR to 2-2 5  5 mg today, tomorrow then alt 5 mg with 2 5 mg  Recheck INR in 1 weeks  TRES Richards

## 2021-06-16 NOTE — PATIENT INSTRUCTIONS
Heart Failure   AMBULATORY CARE:   Heart failure  is a condition that does not allow your heart to fill or pump properly  Not enough oxygen in your blood gets to your organs and tissues  Fluid may not move through your body properly  Fluid builds up and causes swelling and trouble breathing  This is known as congestive heart failure  Heart failure may start in the left or right ventricle  Heart failure is often caused by damage or injury to your heart  The damage may be caused by other heart problems, diabetes, or high blood pressure  The damage may have also been caused by an infection  Heart failure is a long-term condition that tends to get worse over time  It is important to manage your health to improve your quality of life  Common signs and symptoms:   · Trouble breathing with activity that worsens to trouble breathing at rest    · Shortness of breath while lying flat    · Severe shortness of breath and coughing at night that usually wakes you    · Feeling lightheaded when you stand up    · Purple color around your mouth and nails    · Confusion or anxiety    · Chest pain at night    · Periods of no breathing, then breathing fast    · Lack of energy (often worsened by physical activity), or trouble sleeping    · Swelling in your ankles, legs, or abdomen    · Heartbeat that is fast or not regular    · Fingers and toes feel cool to the touch    Call your local emergency number (911 in the [address] [facility]) if:   · You have any of the following signs of a heart attack:      ? Squeezing, pressure, or pain in your chest    ? You may  also have any of the following:     § Discomfort or pain in your back, neck, jaw, stomach, or arm    § Shortness of breath    § Nausea or vomiting    § Lightheadedness or a sudden cold sweat      Call your doctor if:   · Your heartbeat is fast, slow, or uneven all the time  · You have symptoms of worsening heart failure:      ?  Shortness of breath at rest, at night, or that is getting worse in any way    ? Weight gain of 3 or more pounds (1 4 kg) in a day, or more than your healthcare provider says is okay    ? More swelling in your legs or ankles    ? Abdominal pain or swelling    ? More coughing    ? Loss of appetite    ? Feeling tired all the time    · You feel hopeless or depressed, or you have lost interest in things you used to enjoy  · You often feel worried or afraid  · You have questions or concerns about your condition or care  Treatment for heart failure  may include any of the following:  · Medicines  may be needed to help regulate your heart rhythm  You may also need medicines to lower your blood pressure, and to decrease extra fluids  · Oxygen  may help you breathe easier if your oxygen level is lower than normal  A CPAP machine may be used to keep your airway open while you sleep  · Cardiac rehab  is a program run by specialists who will help you safely strengthen your heart  In the program you will learn about exercise, relaxation, stress management, and heart-healthy nutrition  Cardiac rehab may be recommended if your heart failure is not severe  · Surgery  can be done to implant a pacemaker or another device in your chest to regulate your heart rhythm  Other types of surgery can open blocked heart vessels, replace a damaged heart valve, or remove scar tissue  Manage swelling from extra fluid:   · Elevate (raise) your legs above the level of your heart  This will help with fluid that builds up in your legs or ankles  Elevate your legs as often as possible during the day  Prop your legs on pillows or blankets to keep them elevated comfortably  Try not to stand for long periods of time during the day  Move around to keep your blood circulating  · Limit sodium (salt)  Ask how much sodium you can have each day  Your healthcare provider may give you a limit, such as 2,300 milligrams (mg) a day   Your provider or a dietitian can teach you how to read food labels for the number of mg in a food  He or she can also help you find ways to have less salt  For example, if you add salt to food as you cook, do not add more at the table  · Drink liquids as directed  You may need to limit the amount of liquid you drink within 24 hours  Your healthcare provider will tell you how much liquid to have and which liquids are best for you  He or she may tell you to limit liquid to 1 5 to 2 liters in a day  He or she will also tell you how often to drink liquid throughout the day  · Weigh yourself every morning  Use the same scale, in the same spot  Do this after you use the bathroom, but before you eat or drink  Wear the same type of clothing each time  Write down your weight and call your healthcare provider if you have a sudden weight gain  Swelling and weight gain are signs of fluid buildup  Manage heart failure: Your quality of life may improve with treatment and the following:  · Do not smoke  Nicotine and other chemicals in cigarettes and cigars can cause lung and heart damage  Ask your healthcare provider for information if you currently smoke and need help to quit  E-cigarettes or smokeless tobacco still contain nicotine  Talk to your healthcare provider before you use these products  · Do not drink alcohol or use illegal drugs  Alcohol and drugs can increase your risk for high blood pressure, diabetes, and coronary artery disease  · Eat heart-healthy foods  Heart-healthy foods include fruits, vegetables, lean meat (such as beef, chicken, or pork), and low-fat dairy products  Fatty fish such as salmon and tuna are also heart healthy  Other heart-healthy foods include walnuts, whole-grain breads, beans, and cooked beans  Replace butter and margarine with heart-healthy oils such as olive oil or canola oil  Your provider or a dietitian can help you create heart-healthy meal plans  · Manage any chronic health conditions you have    These include high blood pressure, diabetes, obesity, high cholesterol, metabolic syndrome, and COPD  You will have fewer symptoms if you manage these health conditions  Follow your healthcare provider's recommendations and follow up with him or her regularly  · Maintain a healthy weight  Being overweight can increase your risk for high blood pressure, diabetes, and coronary artery disease  These conditions can make your symptoms worse  Ask your healthcare provider how much you should weigh  Ask him or her to help you create a weight loss plan if you are overweight  · Stay active  Activity can help keep your symptoms from getting worse  Walking is a type of physical activity that helps maintain your strength and improve your mood  Physical activity also helps you manage your weight  Work with your healthcare provider to create an exercise plan that is right for you  · Get vaccines as directed  The flu and pneumonia can be severe for a person who has heart failure  Vaccines protect you from these infections  Get a flu shot every year as soon as it is recommended, usually in September or October  You may also need the pneumonia vaccine  Your healthcare provider can tell you if you need other vaccines, and when to get them  Follow up with your doctor or cardiologist within 7 days or as directed: You may need to return for other tests  You may need home health care  A healthcare provider will monitor your vital signs, weight, and make sure your medicines are working  Write down your questions so you remember to ask them during your visits  Join a support group:  Heart failure can be difficult to manage  It may be helpful to talk with others who have heart failure  You may learn how to better manage your condition or get emotional support  For more information:  · [address]  [place] , [place]   Phone: 1- 469 - [phone]  Web Address: https://www strong com/  org     © Copyright [employer] 2021 Information is for [employer] use only and may not be sold, redistributed or otherwise used for commercial purposes  All illustrations and images included in CareNotes® are the copyrighted property of [employer]  or Mariama Melendez  The above information is an  only  It is not intended as medical advice for individual conditions or treatments  Talk to your doctor, nurse or pharmacist before following any medical regimen to see if it is safe and effective for you

## 2021-06-16 NOTE — PROGRESS NOTES
Subjective:        Patient ID: Rossi Hernandez is a 57 y o  male  Chief Complaint:  Eldridge Essex was in [facility] since I saw him last, has seen several cardiologists there, notes reviewed  It sounds as if he develops recurrent symptomatic right pleural effusion requiring chest tube, CT surgical evaluation, and medication changes  Cirrhosis as the etiology has been questioned, he was discharged on Tylenol  Multiple medications changed, I spent about 15 minutes updating his med list today  Several prescriptions he is not taking, says he cannot afford the gout medication  Feels he is breathing better, no significant orthopnea, edema chronic and unchanged, weight reasonably stable since he is home, denies any chest pains, no fevers or chills, no significant cough  The following portions of the patient's history were reviewed and updated as appropriate: allergies, current medications, past family history, past medical history, past social history, past surgical history and problem list   Review of Systems   Constitutional: Negative for chills, diaphoresis, malaise/fatigue and weight gain  HENT: Negative for nosebleeds and stridor  Eyes: Negative for double vision, vision loss in left eye, vision loss in right eye and visual disturbance  Cardiovascular: Positive for dyspnea on exertion and leg swelling  Negative for chest pain, claudication, cyanosis, irregular heartbeat, near-syncope, orthopnea, palpitations, paroxysmal nocturnal dyspnea and syncope  Respiratory: Negative for cough, shortness of breath, snoring and wheezing  Endocrine: Negative for polydipsia, polyphagia and polyuria  Hematologic/Lymphatic: Negative for bleeding problem  Does not bruise/bleed easily  Skin: Negative for flushing and rash  Musculoskeletal: Positive for muscle weakness  Negative for falls and myalgias     Gastrointestinal: Negative for abdominal pain, heartburn, hematemesis, hematochezia, melena and nausea  Genitourinary: Negative for hematuria  Neurological: Negative for brief paralysis, dizziness, focal weakness, headaches, light-headedness, loss of balance and vertigo  Psychiatric/Behavioral: Negative for altered mental status and substance abuse  Allergic/Immunologic: Negative for hives  Objective:      /88   Pulse 98   Ht 6' (1 829 m)   Wt 88 5 kg (195 lb)   BMI 26 45 kg/m²   Physical Exam  Constitutional:       General: He is not in acute distress  Appearance: He is well-developed  HENT:      Head: Normocephalic and atraumatic  Eyes:      General: No scleral icterus  Pupils: Pupils are equal, round, and reactive to light  Neck:      Thyroid: No thyromegaly  Vascular: No JVD  Cardiovascular:      Rate and Rhythm: Normal rate  Rhythm irregular  Heart sounds: Normal heart sounds  No murmur heard  No friction rub  No gallop  Pulmonary:      Effort: Pulmonary effort is normal  No respiratory distress  Breath sounds: Normal breath sounds  No stridor  No wheezing or rales  Abdominal:      General: Bowel sounds are normal  There is no distension  Palpations: Abdomen is soft  There is no mass  Tenderness: There is no abdominal tenderness  Musculoskeletal:         General: No deformity  Normal range of motion  Cervical back: Normal range of motion and neck supple  Right lower leg: Edema present  Left lower leg: Edema present  Skin:     General: Skin is warm and dry  Coloration: Skin is not pale  Findings: No erythema  Neurological:      Mental Status: He is alert and oriented to person, place, and time  Coordination: Coordination normal    Psychiatric:         Behavior: Behavior normal          Lab Review:   No results displayed because visit has over 200 results        Admission on 05/22/2021, Discharged on 05/26/2021   Component Date Value    Fecal Occult Blood Diagn* 05/26/2021 Negative     Site 05/22/2021 RIGHT PLEURAL     WBC, Fluid 05/22/2021 931     Body Fluid Culture, Ster* 05/22/2021 No growth     Gram Stain Result 05/22/2021 Rare Polys     Gram Stain Result 05/22/2021 No organisms seen     Glucose, Fluid 05/22/2021 55     LD, Fluid 05/22/2021 262     Case Report 05/22/2021                      Value:Non-gynecologic Cytology                          Case: [id number]                                  Authorizing Provider:  Blanca Alfonso PA-C          Collected:           05/22/2021 1738              Ordering Location:     [facility]        Received:            05/22/2021 27 Shantal Mantilla 2                                                             Pathologist:           Constantin Sy DO                                                     Specimens:   A) - Thoracentesis, Right                                                                           B) - Thoracentesis, Cell Block                                                             Final Diagnosis 05/22/2021                      Value: This result contains rich text formatting which cannot be displayed here  Matt Flores Description 05/22/2021                      Value: This result contains rich text formatting which cannot be displayed here   Additional Information 05/22/2021                      Value: This result contains rich text formatting which cannot be displayed here      PH BODY FLUID 05/22/2021 8 5     Protein, Fluid 05/22/2021 5 1     Unit Product Code 05/23/2021 [id number]     Unit Number 05/23/2021 [id number]     Unit ABO 05/23/2021 AB     Unit [facility] 05/23/2021 POS     Unit Dispense Status 05/23/2021 Presumed Trans     Clarity, UA 05/23/2021 Clear     Color, UA 05/23/2021 Light Yellow     Specific Gravity, UA 05/23/2021 1 015     pH, UA 05/23/2021 5 5     Glucose, UA 05/23/2021 Negative     Ketones, UA 05/23/2021 Negative     Blood, UA 05/23/2021 Negative  Protein, UA 05/23/2021 Negative     Nitrite, UA 05/23/2021 Negative     Bilirubin, UA 05/23/2021 Negative     Urobilinogen, UA 05/23/2021 0 2     Leukocytes, UA 05/23/2021 Negative     WBC, UA 05/23/2021 0-1*    RBC, UA 05/23/2021 None Seen     Bacteria, UA 05/23/2021 Occasional     Epithelial Cells 05/23/2021 Occasional     Chloride, Ur 05/23/2021 41     Sodium, Ur 05/23/2021 30     Urea Nitrogen, Ur 05/23/2021 643     Creatinine, Ur 05/23/2021 79 0     Hemoglobin 05/22/2021 7 5*    Hematocrit 05/22/2021 24 2*    Total Counted 05/22/2021 100     Neutrophils % (Fluid) 05/22/2021 57     Lymphs % (Fluid) 05/22/2021 14     Eosinophils % (Fluid) 05/22/2021 11     Histiocyte % (Fluid) 05/22/2021 8     Monocytes % (Fluid) 05/22/2021 10     Hemoglobin 05/23/2021 7 0*    Hematocrit 05/23/2021 21 6*    Protime 05/23/2021 25 0*    INR 05/23/2021 2 32*    Sodium 05/23/2021 135*    Potassium 05/23/2021 4 4     Chloride 05/23/2021 96*    CO2 05/23/2021 29     ANION GAP 05/23/2021 10     BUN 05/23/2021 82*    Creatinine 05/23/2021 2 43*    Glucose 05/23/2021 119     Calcium 05/23/2021 8 8     eGFR 05/23/2021 27     Protime 05/23/2021 22 8*    INR 05/23/2021 2 07*    WBC 05/23/2021 4 30*    RBC 05/23/2021 2 35*    Hemoglobin 05/23/2021 7 1*    Hematocrit 05/23/2021 22 6*    MCV 05/23/2021 96     MCH 05/23/2021 30 2     MCHC 05/23/2021 31 4     RDW 05/23/2021 17 9*    MPV 05/23/2021 10 7     Platelets 73/13/4258 149     nRBC 05/23/2021 0     Neutrophils Relative 05/23/2021 73     Immat GRANS % 05/23/2021 1     Lymphocytes Relative 05/23/2021 11*    Monocytes Relative 05/23/2021 11     Eosinophils Relative 05/23/2021 3     Basophils Relative 05/23/2021 1     Neutrophils Absolute 05/23/2021 3 21     Immature Grans Absolute 05/23/2021 0 02     Lymphocytes Absolute 05/23/2021 0 45*    Monocytes Absolute 05/23/2021 0 48     Eosinophils Absolute 05/23/2021 0 11     Basophils Absolute 05/23/2021 0 03     Hemoglobin 05/23/2021 7 4*    Hematocrit 05/23/2021 22 8*    LD 05/23/2021 230     Sodium 05/24/2021 137     Potassium 05/24/2021 4 3     Chloride 05/24/2021 97*    CO2 05/24/2021 30     ANION GAP 05/24/2021 10     BUN 05/24/2021 80*    Creatinine 05/24/2021 2 36*    Glucose 05/24/2021 108     Calcium 05/24/2021 9 4     eGFR 05/24/2021 28     WBC 05/24/2021 4 39     RBC 05/24/2021 2 57*    Hemoglobin 05/24/2021 7 7*    Hematocrit 05/24/2021 25 0*    MCV 05/24/2021 97     MCH 05/24/2021 30 0     MCHC 05/24/2021 30 8*    RDW 05/24/2021 18 0*    Platelets 53/55/2836 173     MPV 05/24/2021 9 8     Protime 05/24/2021 17 2*    INR 05/24/2021 1 44*    WBC 05/25/2021 5 01     RBC 05/25/2021 2 23*    Hemoglobin 05/25/2021 6 8*    Hematocrit 05/25/2021 21 7*    MCV 05/25/2021 97     MCH 05/25/2021 30 5     MCHC 05/25/2021 31 3*    RDW 05/25/2021 18 0*    Platelets 84/81/9444 166     MPV 05/25/2021 11 0     Sodium 05/25/2021 135*    Potassium 05/25/2021 4 9     Chloride 05/25/2021 97*    CO2 05/25/2021 30     ANION GAP 05/25/2021 8     BUN 05/25/2021 85*    Creatinine 05/25/2021 2 31*    Glucose 05/25/2021 100     Calcium 05/25/2021 9 2     eGFR 05/25/2021 29     Unit Product Code 05/26/2021 [id number]     Unit Number 05/26/2021 [id number]     Unit ABO 05/26/2021 O     Unit RH 05/26/2021 NEG     Crossmatch 05/26/2021 Compatible     Unit Dispense Status 05/26/2021 Presumed Trans     ABO Grouping 05/25/2021 O     Rh Factor 05/25/2021 Positive     Antibody Screen 05/25/2021 Negative     Specimen Expiration Date 05/25/2021 [id number]     Hemoglobin 05/25/2021 7 1*    WBC 05/26/2021 6 00     RBC 05/26/2021 2 51*    Hemoglobin 05/26/2021 7 6*    Hematocrit 05/26/2021 24 6*    MCV 05/26/2021 98     MCH 05/26/2021 30 3     MCHC 05/26/2021 30 9*    RDW 05/26/2021 17 7*    MPV 05/26/2021 11 2     Platelets 81/92/2280 191     nRBC 05/26/2021 0     Neutrophils Relative 05/26/2021 74     Immat GRANS % 05/26/2021 1     Lymphocytes Relative 05/26/2021 11*    Monocytes Relative 05/26/2021 9     Eosinophils Relative 05/26/2021 4     Basophils Relative 05/26/2021 1     Neutrophils Absolute 05/26/2021 4 46     Immature Grans Absolute 05/26/2021 0 03     Lymphocytes Absolute 05/26/2021 0 68     Monocytes Absolute 05/26/2021 0 56     Eosinophils Absolute 05/26/2021 0 24     Basophils Absolute 05/26/2021 0 03     Sodium 05/26/2021 135*    Potassium 05/26/2021 4 8     Chloride 05/26/2021 96*    CO2 05/26/2021 28     ANION GAP 05/26/2021 11     BUN 05/26/2021 85*    Creatinine 05/26/2021 2 08*    Glucose 05/26/2021 110     Calcium 05/26/2021 9 1     eGFR 05/26/2021 33     Hemoglobin 05/26/2021 7 5*    Hematocrit 05/26/2021 23 5*   Admission on 05/22/2021, Discharged on 05/22/2021   Component Date Value    WBC 05/22/2021 3 74*    RBC 05/22/2021 2 51*    Hemoglobin 05/22/2021 7 6*    Hematocrit 05/22/2021 23 4*    MCV 05/22/2021 93     MCH 05/22/2021 30 3     MCHC 05/22/2021 32 5     RDW 05/22/2021 17 8*    MPV 05/22/2021 10 4     Platelets 31/17/9945 162     nRBC 05/22/2021 0     Neutrophils Relative 05/22/2021 66     Immat GRANS % 05/22/2021 0     Lymphocytes Relative 05/22/2021 16     Monocytes Relative 05/22/2021 12     Eosinophils Relative 05/22/2021 5     Basophils Relative 05/22/2021 1     Neutrophils Absolute 05/22/2021 2 46     Immature Grans Absolute 05/22/2021 0 01     Lymphocytes Absolute 05/22/2021 0 60     Monocytes Absolute 05/22/2021 0 45     Eosinophils Absolute 05/22/2021 0 18     Basophils Absolute 05/22/2021 0 04     Sodium 05/22/2021 129*    Potassium 05/22/2021 4 0     Chloride 05/22/2021 94*    CO2 05/22/2021 30     ANION GAP 05/22/2021 5     BUN 05/22/2021 88*    Creatinine 05/22/2021 2 72*    Glucose 05/22/2021 116     Calcium 05/22/2021 8 3     Corrected Calcium 05/22/2021 9 1     AST 05/22/2021 20     ALT 05/22/2021 17     Alkaline Phosphatase 05/22/2021 133*    Total Protein 05/22/2021 7 8     Albumin 05/22/2021 3 0*    Total Bilirubin 05/22/2021 0 46     eGFR 05/22/2021 24     Troponin I 05/22/2021 <0 02     NT-proBNP 05/22/2021 981 0*    Protime 05/22/2021 38 6*    INR 05/22/2021 4 07*    LACTIC ACID 05/22/2021 1 2     ABO Grouping 05/22/2021 O     Rh Factor 05/22/2021 Positive     Antibody Screen 05/22/2021 Negative     Specimen Expiration Date 05/22/2021 [id number]     pH, Karthik 05/22/2021 7 412*    pCO2, Karthik 05/22/2021 41 9*    pO2, Karthik 05/22/2021 64 4*    HCO3, Karthik 05/22/2021 26 1     Base Excess, Karthik 05/22/2021 1 3     O2 Content, Karthik 05/22/2021 10 2     O2 HGB, VENOUS 05/22/2021 86 6*   Lab on 05/21/2021   Component Date Value    Miscellaneous Lab Test R* 05/21/2021 H Pylori IgM   IgG, IgA Antibodies     Comment 05/21/2021 See written result report     REFERRAL TEST NAME 05/21/2021 H Pylori IgM, IgG, IgA Antibodies     REFERRAL LAB 05/21/2021 [facility]     REFERRAL TEST CODE 05/21/2021 [id number]     Hemoglobin A1C 05/21/2021 5 5     EAG 05/21/2021 111     Sodium 05/21/2021 131*    Potassium 05/21/2021 4 2     Chloride 05/21/2021 96*    CO2 05/21/2021 30     ANION GAP 05/21/2021 5     BUN 05/21/2021 93*    Creatinine 05/21/2021 2 80*    Glucose, Fasting 05/21/2021 107*    Calcium 05/21/2021 8 6     Corrected Calcium 05/21/2021 9 4     AST 05/21/2021 20     ALT 05/21/2021 16     Alkaline Phosphatase 05/21/2021 130*    Total Protein 05/21/2021 7 9     Albumin 05/21/2021 3 0*    Total Bilirubin 05/21/2021 0 67     eGFR 05/21/2021 23     A-1 Antitrypsin 05/21/2021 198*    A-1 Antitrypsin Pheno 05/21/2021 MM     Magnesium 05/21/2021 2 3     Phosphorus 05/21/2021 5 2*    Calcium, Ionized 05/21/2021 1 09*    AFP TUMOR MARKER 05/21/2021 2 7     Protime 05/21/2021 39 6*    INR 05/21/2021 4 20*    Ventricular Rate 05/22/2021 108     Atrial Rate 05/22/2021 111     QRSD Interval 05/22/2021 104     QT Interval 05/22/2021 332     QTC Interval 05/22/2021 444     QRS Axis 05/22/2021 77     T Wave Axis 05/22/2021 68    Anticoag visit on 05/20/2021   Component Date Value    POCT INR 05/20/2021 5 0     WBC 05/21/2021 4 18*    RBC 05/21/2021 2 55*    Hemoglobin 05/21/2021 7 4*    Hematocrit 05/21/2021 23 9*    MCV 05/21/2021 94     MCH 05/21/2021 29 0     MCHC 05/21/2021 31 0*    RDW 05/21/2021 18 0*    MPV 05/21/2021 10 4     Platelets 96/80/9349 170     nRBC 05/21/2021 0     Neutrophils Relative 05/21/2021 70     Immat GRANS % 05/21/2021 1     Lymphocytes Relative 05/21/2021 12*    Monocytes Relative 05/21/2021 12     Eosinophils Relative 05/21/2021 4     Basophils Relative 05/21/2021 1     Neutrophils Absolute 05/21/2021 2 97     Immature Grans Absolute 05/21/2021 0 03     Lymphocytes Absolute 05/21/2021 0 48*    Monocytes Absolute 05/21/2021 0 50     Eosinophils Absolute 05/21/2021 0 16     Basophils Absolute 05/21/2021 0 04      XR chest portable    Result Date: 6/3/2021  Narrative: CHEST INDICATION:   s/p vats decortication  COMPARISON:  2/25/2021 chest radiograph, 5/30/2021 CT chest EXAM PERFORMED/VIEWS:  XR CHEST PORTABLE FINDINGS: Cardiomediastinal silhouette appears unremarkable  Right basilar pigtail thoracostomy tube has been replaced with 2 large bore thoracostomy tubes  Small basilar pneumothorax is present  Right pleural effusion has decreased in size  Right lower lobe consolidation is unchanged  Osseous structures appear within normal limits for patient age  Impression: 1  Interval exchange of basilar pigtail thoracostomy tube for 2 large bore thoracostomy tubes  Small right basilar pneumothorax is present  Right pleural effusion has decreased in size  2   Unchanged right lower lobe consolidation   Workstation performed: [id number]     XR chest portable    Result Date: 5/25/2021  Narrative: CHEST INDICATION: f/u pleural effusion  chest tube remains on suction    COMPARISON:  5/24/2021 EXAM PERFORMED/VIEWS:  XR CHEST PORTABLE FINDINGS:  Pigtail chest tube identified within the right lung base, unchanged in position  Stable cardiomediastinal structures  The left lung field is clear  Right basilar effusion and adjacent opacity  The upper lung field is clear  No pneumothorax  Osseous structures appear within normal limits for patient age  Impression: Right basilar chest tube unchanged  Stable right basilar effusion and opacity  Workstation performed: [id number]     XR chest portable    Result Date: 5/24/2021  Narrative: CHEST INDICATION:   pleural effusion  COMPARISON:  Chest radiograph from 5/22/2021 and chest CT from 2/22/2021  EXAM PERFORMED/VIEWS:  XR CHEST PORTABLE  FINDINGS: Cardiomediastinal silhouette normal  Persistent right base pigtail catheter with no change in moderate right effusion and right base atelectasis  No pneumothorax  Osseous structures normal for age  Impression: Persistent right pigtail with no change in moderate right effusion and right base atelectasis from 5/22/2021  Workstation performed: [id number]     CXR 1 view PA portable stat    Result Date: 5/23/2021  Narrative: CHEST INDICATION:   assess right effusion after chest tube placement  COMPARISON:  Chest radiograph from 5/22/2021 and chest CT from 2/22/2021  EXAM PERFORMED/VIEWS:  XR CHEST PORTABLE  FINDINGS: Cardiomediastinal silhouette normal  Right pigtail insertion  Decrease in right effusion, still moderate, with right base atelectasis  No pneumothorax  Osseous structures normal for age  Impression: Decrease in right effusion after pigtail insertion, but still moderate, with right base atelectasis  No pneumothorax  Workstation performed: [id number]     XR chest 1 view portable    Result Date: 5/23/2021  Narrative: CHEST INDICATION:   Shortness of breath   COMPARISON:  Chest radiograph from 2/22/2021 and chest CT from 2/23/2021  EXAM PERFORMED/VIEWS:  XR CHEST PORTABLE  FINDINGS: Cardiomediastinal silhouette normal  Increase in large right effusion with moderate right base atelectasis  No effusion or pneumothorax  Osseous structures normal for age  Impression: Large right effusion and right base atelectasis, increased from February 2021  Workstation performed: [id number]     XR chest pa & lateral    Result Date: 6/8/2021  Narrative: CHEST INDICATION:   right chest tube position  COMPARISON:  6/6/2021 EXAM PERFORMED/VIEWS:  XR CHEST PA & LATERAL FINDINGS:  There is a right basilar chest tube in stable position  Cardiomediastinal silhouette appears unremarkable  There is a stable loculated right hydropneumothorax with overlying opacity  Osseous structures appear within normal limits for patient age  Impression: Stable loculated right basilar hydropneumothorax with chest tube in place  Workstation performed: [id number]     XR chest pa & lateral    Result Date: 6/8/2021  Narrative: CHEST INDICATION:   s/p R chest tube removal  COMPARISON:  Chest x-ray dated June 7, 2021  EXAM PERFORMED/VIEWS:  XR CHEST PA & LATERAL FINDINGS: The cardiomediastinal silhouette is unchanged from the prior exam  There has been interval removal of a right-sided chest tube  A previously described persistent small loculated right basilar hydropneumothorax with atelectasis at the right lung base is stable  Mild right-sided chest wall subcutaneous emphysema is also  similar to the prior exam  Osseous structures appear within normal limits for patient age  Impression: Interval removal of a right-sided chest tube with a persistent small loculated right basilar hydropneumothorax and right lung base atelectasis  Workstation performed: [id number]     XR chest pa & lateral    Result Date: 6/7/2021  Narrative: CHEST INDICATION:   Right pneumo  Status post VATS decortication on 6/2/2021   COMPARISON:  Chest radiograph from 6/5/2021 and chest CT from 5/30/2021  EXAM PERFORMED/VIEWS:  XR CHEST PA & LATERAL  DUAL ENERGY SUBTRACTION  FINDINGS: Cardiomediastinal silhouette normal  Chest tube in the inferior right pleural space  Persistent small loculated right basilar hydropneumothorax with atelectasis in the right base  Mild subcutaneous emphysema in the right chest wall  Osseous structures normal for age  Impression: Right chest tube with persistent small loculated right basilar hydropneumothorax and right base atelectasis  Workstation performed: [id number]     XR chest pa & lateral    Result Date: 6/5/2021  Narrative: CHEST INDICATION:   chest tubes to water seal   Status post VATS decortication  COMPARISON:  Chest radiograph from 6/2/2021 and chest CT from 5/30/2021  EXAM PERFORMED/VIEWS:  XR CHEST PA & LATERAL  DUAL ENERGY SUBTRACTION  FINDINGS: Mild cardiomegaly  Persistence of 2 right chest tubes with small right basilar pneumothorax and rounded atelectasis in the right base  No definite effusion  Osseous structures normal for age  Impression: Persistent small right basilar pneumothorax and rounded atelectasis Workstation performed: [id number]     XR wrist 2 vw right    Result Date: 5/31/2021  Narrative: RIGHT WRIST INDICATION:   wrist swelling  COMPARISON:  None VIEWS:  XR WRIST 2 VW RIGHT FINDINGS: There is no acute fracture or dislocation  No significant degenerative changes  No lytic or blastic osseous lesion  Soft tissues are unremarkable  Impression: No acute osseous abnormality  Workstation performed: [id number]     CT chest wo contrast    Result Date: 5/30/2021  Narrative: CT CHEST WITHOUT IV CONTRAST INDICATION:   Evaluate R pleural effusion s/p TPA  Recurrent right bloody pleural effusion, exudative but negative for malignancy  Presented to the emergency department at 2/22/2021 with shortness of breath  Denies trauma at that time  COMPARISON:  Chest radiograph from 5/21/2021 and chest CT from 5/26/2021 and 2/22/2021   TECHNIQUE: Chest CT without intravenous contrast   Axial, sagittal, coronal 2D reformats and coronal MIPS from source data  Radiation dose length product (DLP):  795 74 mGy-cm   Radiation dose exposure minimized using iterative reconstruction and automated exposure control  FINDINGS: LUNGS:  No acute disease  Masslike opacities in the right middle and right lower lobe due to benign rounded atelectasis  AIRWAYS: No significant filling defects  PLEURA:  Pigtail catheter in the right posterior costophrenic sulcus with small loculated right hydropneumothorax  HEART/GREAT VESSELS:  Moderate cardiomegaly Mild coronary artery calcification indicating atherosclerotic heart disease  Pulmonary artery enlargement  MEDIASTINUM AND DILLON:  Unremarkable  CHEST WALL AND LOWER NECK: Partially imaged moderate bilateral gynecomastia  UPPER ABDOMEN:  Chronic calcified subcapsular splenic collection, likely due to remote trauma  Cholecystectomy  OSSEOUS STRUCTURES: Moderate degenerative disease in the spine  Healed/healing fractures of the anterior 3rd through 7th ribs  Impression: Pigtail catheter in right posterior costophrenic sulcus with small loculated right hydropneumothorax, possibly ex vacuo  Masslike opacities in the right middle and right lower lobe due to benign rounded atelectasis  Healed/healing fractures of the anterior right 3rd through 7th ribs, visible on 5/26/2021, new since 2/22/2021  Correlate with recent trauma  Pulmonary artery enlargement which can be seen with pulmonary hypertension  Workstation performed: [id number]     CT chest wo contrast    Result Date: 5/26/2021  Narrative: CT CHEST WITHOUT IV CONTRAST INDICATION:   Pleural effusion re-eval pleural effusion, chest tube placement  COMPARISON:  Compared to 2/22/2021 TECHNIQUE: CT examination of the chest was performed without intravenous contrast   Axial, sagittal, and coronal 2D reformatted images were created from the source data and submitted for interpretation  Radiation dose length product (DLP) for this visit:  572 mGy-cm   This examination, like all CT scans performed in the [facility], was performed utilizing techniques to minimize radiation dose exposure, including the use of iterative reconstruction and automated exposure control  FINDINGS: LUNGS:  Posterior right lower lobe atelectasis is unchanged  Subsegmental atelectasis in the middle lobe is unchanged     There is no tracheal or endobronchial lesion  PLEURA:  Interval decrease in the pleural effusion which now demonstrates areas of hyperdensity suggesting hematomas  Pigtail catheter is seen in place  HEART/GREAT VESSELS: Prominent enlarged pulmonary artery measuring 4 cm is unchanged  Unremarkable for patient's age  MEDIASTINUM AND DILLON:  Unremarkable  CHEST WALL AND LOWER NECK:   Unremarkable  VISUALIZED STRUCTURES IN THE UPPER ABDOMEN:  Solid mass with calcified wall arising from the spleen exophytically measuring 6 6 x 4 0 cm is unchanged  OSSEOUS STRUCTURES:  No acute fracture or destructive osseous lesion  Impression: Improvement in the prior pleural effusion with new hyperdensities consistent with hematomas with pigtail catheter in place  Atelectasis of the middle lobe and right lower lobe unchanged  Workstation performed: [id number]     IR chest tube placement    Result Date: 5/22/2021  Narrative: Ultrasound  guided right chest tube placement Clinical History: Shortness of breath and recurrent right large pleural effusion  INR 4 0 on Coumadin  Conscious sedation time: local lidocaine used Technique: The patient was brought to the interventional radiology area and placed upright on a stretcher, leaning on the table  The right chest was then examined with ultrasound and the skin was marked  The skin was then prepped, and draped in usual sterile fashion  Lidocaine was administered to the skin and a small skin incision was made   Under direct ultrasound guidance, a 19 gauge needle was advanced into the pleural space  A Foss wire was advanced through the needle, and the needle was removed  After tract dilatation, an 8 [place] all-purpose drainage catheter was advanced, and the loop formed in the pleural space  The catheter was connected to an Atrium  2000cc of bloody pleural fluid was drained  The patient tolerated the procedure well and suffered no complications  Impression: Impression: Successful placement of an 8 [place] all-purpose drainage catheter into the right pleural space under ultrasound guidance, yielding 2000cc of bloody pleural fluid  Plan:  Place to waterseal tonight and back to suction tomorrow to slowly drain the very large pleural effusion which is likely over 3 liters  Hold Coumadin for now to lower the INR to therapeutic range  Workstation performed: [id number]     US elastography/UGAP    Result Date: 6/2/2021  Narrative: ELASTOGRAPHY, LIVER ULTRASOUND INDICATION:   possible cirrhosis  COMPARISON:  None TECHNIQUE: Targeted ultrasound of the liver was performed with a curvilinear transducer utilizing a total of 10 shear wave Elastography samples  FINDINGS:  Shear Wave Elastography sampling was performed to evaluate stiffness changes within the liver associated with fibrosis  The resulting E median is 10 62 kPa  The corresponding Metavir score is F3 (severefibrosis), 9 40-11 87 kPa  1 77-1 99 m/s  The IQR/median value is 29 8 %  (IQR of less than 30% is considered a satisfactory data set)  Ultrasound-guided attenuation parameter (UGAP) Liver Steatosis Grading Attenuation coefficient:  0 72 dB/cm/MHz Liver steatosis grading:  S3 ( > 67% steatosis)  Range >0 65 dB/cm/Mhz IQR/Med:  6 % (Less than or equal to 30% is a satisfactory data set ) Reference White paper for GE ultrasound-guided attenuation parameter https://www [employer]/  au/-/[email or web address]  pdf?la=en-au     Impression: Metavir score: F3, Severe Fibrosis    Please note the signal quality obtained for Elastography is quite limited in the data set IQR/median is at the borderline of acceptability for accurate reading  Liver steatosis grading:  S3 ( > 67% steatosis) Workstation performed: [id number]     IR chest tube check/change/reposition/upsize    Result Date: 5/27/2021  Narrative: Ultrasound and fluoroscopically guided right chest tube placement Clinical History: 68-year-old male with loculated right pleural effusion Radiation dose: 1202 mGy Concious sedation time: 0 Technique: The patient was brought to the interventional radiology area and placed in the left lateral decubitus position on the fluoroscopic scan table The skin was then marked, prepped, and draped in usual sterile fashion  Lidocaine was administered to the skin  The 8 Chadian chest tube skin suture was removed and the locking mechanism was released  A wire was advanced through the existing catheter and curled in the pleural space  Attempts to dilate over the wire were unsuccessful secondary to acute ankle of prior chest tube tract  The catheter was removed  Next under ultrasound guidance using permanent recording and reporting a 19-gauge needle was advanced into the right pleural space  A Amplatz wire was advanced through the catheter  The needle was removed  A skin incision was made along the wire  Serial dilation over the wire was performed followed by placement of a 12 [place] all-purpose drainage catheter  Bloody fluid was aspirated  The pigtail locking mechanism was engaged and a single suture and placed to secure the catheter to the skin  Both ultrasound and fluoroscopy was used to confirm appropriate location  The patient tolerated the procedure well and suffered no complications  Impression: Impression: 1  Successful placement of a 12 Chadian right chest tube under ultrasound and fluoroscopic guidance  Workstation performed: [id number]         Assessment:       1   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 ascites, unspecified hepatic cirrhosis type ([facility] )     2  Hypotension  midodrine (PROAMATINE) 10 MG tablet   3  STEFF (obstructive sleep apnea)     4  Permanent atrial fibrillation ([facility] )     5  Chronic diastolic (congestive) heart failure (HCC)     6  Recurrent right pleural effusion     7  Anemia, unspecified type          Plan:       Stressed importance of medication compliance, salt restriction, and fluid restriction today  March seems to be doing okay with these measures  Current dose Bumex seems reasonable  Renal function seems stable  He is taking his potassium  He is status post VATS decortication  I discontinued Tylenol, not ideal with his cirrhosis history  Encouraged he continue with CPAP use  His AFib is rate controlled on his present dose metoprolol so will not change this  Recommend goal INR 2 0-2 5  He says you manage his INR  Hemoglobin over 8  Status post transfusion  Advised he avoid other blood thinners such as aspirin over-the-counter  Slightly hypertensive will decrease midodrine to b i d     Will slowly try to wean this off  I will see him back every 2 months with you  He will call with any concerning dyspnea or progressive edema

## 2021-06-24 NOTE — TELEPHONE ENCOUNTER
Pt in stating that his right hand is numb still from surgery on 6/2/21  Would like to speak to someone about it  Also asking about medication called in to [facility]   Stating that [facility] keeps calling him asking for a number of sorts  Patient wasn't making much sense when I spoke with him  Please call

## 2021-06-24 NOTE — TELEPHONE ENCOUNTER
Spoke with pt at 1550  Pcp off for two weeks, not seeing him until 7/7  He is having some minor swelling in his right hand and some numbness of his right 4th and 5th digits  He is on medication for gout currently  He did have IV's in that same arm/hand  I advised him to use warm compresses BID and see if that helps  But to continue gout medication, since he had this problem in the hospital and it was before starting these two medications  He will call [facility] to see what information they are exactly looking for

## 2021-06-24 NOTE — PATIENT INSTRUCTIONS
No changes in medication health or diet  No missed or extra doses  No s/s of bleeding TIA or CVA  No new ABX, NSAIDs OCT meds, or supplements  Dose as instructed and recheck in 4 weeks     Swetha Contreras PA-C

## 2021-06-29 NOTE — PATIENT INSTRUCTIONS
No changes in medication health or diet  No missed or extra doses  No s/s of bleeding TIA or CVA  No new ABX, NSAIDs OCT meds, or supplements  Dose as instructed and recheck in one month     Zuhair Patel PA-C

## 2021-07-01 NOTE — PROGRESS NOTES
Thoracic Follow-Up  Assessment/Plan:    Pleural effusion, right  Mr Blossom Kerr is progressing from a thoracic surgery standpoint  His incisions are healing well  His cxr still shows some residual fluid and atelectasis, which will improve over time  He was encouraged to use his IS as much as possible, since he is unable to walk for very long  I would like him to return in one month with a new CXR, but since it is a far drive for them, we will do a visit over the phone, since they do not own cell phones  He is in agreement with the plan  Diagnoses and all orders for this visit:    Pleural effusion, right  -     XR chest pa & lateral; Future          Thoracic History       Diagnosis: Right pleural effusion   Procedure: Right thoracoscopic complete decortication on 6/2/21  Pathology: All cultures were negative      Patient ID: Shahla Lainez is a 57 y o  male  HPI    Mr Blossom Kerr is a 56 yo gentleman with a history of cirrhosis, atrial fibrillation, CHF, diverticulosis, pulmonary HTN, stage 3 CKD, anemia, thrombocytopenia, and hypokalemia who was admitted from 5/26/21-6/9/21 for a right pleural effusion  He was transferred from [address] [place] [address] after undergoing a thoracentesis nd thoracic surgery was consulted  He was treated with tPA through the chest tube after a CT scan from 5/26/21 revealed an improvement in the right pleural effusion with new hyperdensities consistent with lung hematomas  Repeat CT from 5/30/21 revealed a loculated right hydropneumothorax  He underwent a right thoracoscopic decortication on 6/2/21, with last ct removed on 6/7/21  He was stable for d/c to home on 6/9/21  He did receive 2 units of PRBC following surgery  He follows up in the office today for his post op visit with a cxr from 6/29/21, which has not been read yet  On discussion, he is smoking 1 pack of cigarettes per 1 5 weeks  He has a cough with yellow mucous, constant shortness of breath, fatigued   He denies fever, chills, hemoptysis, green sputum production  He is no longer taking any pain medicine for his surgery  The following portions of the patient's history were reviewed and updated as appropriate: allergies, current medications, past family history, past medical history, past social history, past surgical history and problem list     Review of Systems      Objective:   Physical Exam  Vitals reviewed  Constitutional:       General: He is not in acute distress  Appearance: Normal appearance  HENT:      Head:      Comments: Hat on      Mouth/Throat:      Comments: Masked   Eyes:      General: No scleral icterus  Extraocular Movements: Extraocular movements intact  Comments: glasses   Cardiovascular:      Rate and Rhythm: Regular rhythm  Tachycardia present  Heart sounds: No murmur heard  Pulmonary:      Effort: Pulmonary effort is normal  No respiratory distress  Breath sounds: Normal breath sounds  No wheezing  Comments: Generally decreased bs b/l  Decreased bs right base  Musculoskeletal:      Cervical back: Normal range of motion and neck supple  Right lower leg: Edema present  Left lower leg: Edema present  Comments: Uses a cane    Skin:     General: Skin is warm and dry  Comments: B/l LE venous stasis dermatitis  Right thoracoscopic incisions healing well  2 prolene sutures removed  Neurological:      Mental Status: He is alert and oriented to person, place, and time  Gait: Gait normal       Comments: Uses a cane    Psychiatric:         Mood and Affect: Mood normal          Behavior: Behavior normal      /77   Pulse (!) 140   Temp 97 7 °F (36 5 °C) (Temporal)   Resp 16   Ht 6' 1" (1 854 m)   SpO2 98%   BMI 25 73 kg/m²     XR chest pa & lateral    Result Date: 6/8/2021  Impression Stable loculated right basilar hydropneumothorax with chest tube in place   Workstation performed: [id number]

## 2021-07-01 NOTE — ASSESSMENT & PLAN NOTE
Mr Evens Holman is progressing from a thoracic surgery standpoint  His incisions are healing well  His cxr still shows some residual fluid and atelectasis, which will improve over time  He was encouraged to use his IS as much as possible, since he is unable to walk for very long  I would like him to return in one month with a new CXR, but since it is a far drive for them, we will do a visit over the phone, since they do not own cell phones  He is in agreement with the plan

## 2021-07-07 PROBLEM — E66.01 CLASS 3 SEVERE OBESITY DUE TO EXCESS CALORIES WITH SERIOUS COMORBIDITY AND BODY MASS INDEX (BMI) OF 45.0 TO 49.9 IN ADULT (HCC): Status: RESOLVED | Noted: 2018-10-17 | Resolved: 2021-01-01

## 2021-07-07 PROBLEM — N18.32 ACUTE RENAL FAILURE SUPERIMPOSED ON STAGE 3B CHRONIC KIDNEY DISEASE (HCC): Status: RESOLVED | Noted: 2021-01-01 | Resolved: 2021-01-01

## 2021-07-07 PROBLEM — I50.32 CHRONIC DIASTOLIC CHF (CONGESTIVE HEART FAILURE) (HCC): Status: ACTIVE | Noted: 2021-02-22

## 2021-07-07 PROBLEM — E87.5 HYPERKALEMIA: Status: RESOLVED | Noted: 2020-08-21 | Resolved: 2021-01-01

## 2021-07-07 PROBLEM — N17.9 ACUTE RENAL FAILURE SUPERIMPOSED ON STAGE 3B CHRONIC KIDNEY DISEASE (HCC): Status: RESOLVED | Noted: 2021-01-01 | Resolved: 2021-01-01

## 2021-07-07 PROBLEM — E87.6 HYPOKALEMIA: Status: RESOLVED | Noted: 2020-09-04 | Resolved: 2021-01-01

## 2021-07-07 NOTE — ASSESSMENT & PLAN NOTE
Lab Results   Component Value Date    EGFR 44 06/29/2021    EGFR 66 06/09/2021    EGFR 60 06/08/2021    CREATININE 1 66 (H) 06/29/2021    CREATININE 1 18 06/09/2021    CREATININE 1 27 06/08/2021     Patient remains in typical range for kidney function, however, creatinine of 1 66 mg/ dL on the higher end of his typical baseline  Will reassess labs in about 4 weeks  Continue avoid potential nephrotoxins and maximize  Hemodynamic status

## 2021-07-07 NOTE — PROGRESS NOTES
BEV Fairchild 57 y o  male   Date:  7/7/2021    TCM Call (since 6/6/2021)     Date and time call was made  6/10/2021  3:42 PM    Hospital care reviewed  Records reviewed    Patient was hospitialized at  [facility]; [address] 05/22-5/26/2021 and [facility] 05/26-06/09/2021    Date of Admission  05/22/21    Date of discharge  06/09/21    Diagnosis  pleural effusion on right     Disposition  Home    Were the patients medications reviewed and updated  -- (Comment)  STOP - Coreg, Lasix, zestril, and Zaroxolyn START - allopurinol 100mg TID, Bumex 1mg BID, Colcrys 0 6mg qd, neurontin 300mg TID, Toprol XL 25mg BID, Mididrine 10mg TID, and Nicotine Patch 14mg/24 hrs CHANGE - Coumadine 2 5mg qd    Current Symptoms  -- (Comment)  tireness      TCM Call (since 6/6/2021)     Post hospital issues  Reduced activity    Should patient be enrolled in anticoag monitoring? Yes (Comment)  managed by cardiology    Scheduled for follow up?   Yes (Comment)  06/18/2021 @ 12:15pm    Patients specialists  Cardiologist; Nephrologist; Other (comment)    Cardiologist name  Dr Sanjana Flores f/u with one week    Nephrologist name  Dr Jake Johnson - f/u within 7-14 days     Other specialists names  Dr Vasquez Sibley - 06/24/2021 @3:30pm (cardiothoracic)    Did you obtain your prescribed medications  Yes (Comment)  only four meds picked up - will call the pharmacy about the other 4 - had a camplaint of some of the meds costing over $100 00    Do you need help managing your prescriptions or medications  No    Is transportation to your appointment needed  No    I have advised the patient to call PCP with any new or worsening symptoms  sree CELESTE    Living Arrangements  Spouse or Significiant other    Support System  Spouse    The type of support provided  Emotional; Physical    Are you recieving any outpatient services  Yes    What type of services  PT - OT for lymphedema therapy    Are you recieving home care services  No    Are you using any "[employer]" resources  No    Current waiver services  No    Interperter language line needed  No    Comments  patient had a c/o right wrist pain in hospital - please recheck        Hospital records were reviewed  Medications upon discharge reviewed/updated  Discharge Disposition:    Follow up visits with other specialists:       Assessment and Plan: history of right pleural effusion with right 2 thoracostomy followed by pleurodesis  Chronic atrial fibrillation anticoagulated with Coumadin    Chronic diastolic congestive heart failure        Diabetes mellitus type 2 last hemoglobin A1c was 5 5 May 21st    Cirrhosis of the liver with esophageal varices    Anemia    Chronic kidney disease stage 3 followed by Nephrology    Class 3 severe obesity with a BMI of 40 37    Have reviewed the hospital record and discharge summary have reviewed the discharge medication list and the follow-up with specialties I am in agreement with the plan        There are no diagnoses linked to this encounter  HPI:   Patient presents for transition of care management was hospitalized in the [facility] from May 22nd through May 26th and transferred to the [facility] May 26 through June 9th with a right pleural effusion fatigue shortness of breath      ROS: Review of Systems   Constitutional: Positive for fatigue  Respiratory: Positive for shortness of breath  Cardiovascular: Positive for palpitations and leg swelling         Past Medical History:   Diagnosis Date    A-fib [facility])     Acute renal failure superimposed on stage 3b chronic kidney disease ([facility] ) 3/15/2021    Cardiac disease     Chronic combined systolic (congestive) and diastolic (congestive) heart failure (HCC)     Class 3 severe obesity due to excess calories with serious comorbidity and body mass index (BMI) of 45 0 to 49 9 in adult [facility]) 10/17/2018    Diabetes mellitus ([facility] )     D[facility]     History of echocardiogram 11/24/2014    EF 0 30 (30%), Likely mod LV systolic dysfunction  Likely RV dysfunction as well   History of Lexiscan MPI 02/19/2016    EF 0 43 (43%), no prior MI or ischemia   Hx of echocardiogram 04/28/2017    Normal EF, Normal LV systolic function  Mild concentric LV hypertrophy  Mild mitral and tricuspid regurg   Hx: recurrent pneumonia     Hypertension     Hypokalemia 9/4/2020    Long term (current) use of anticoagulants     Morbid (severe) obesity due to excess calories (HCC)     Neuropathy        Past Surgical History:   Procedure Laterality Date    HERNIA REPAIR      IR CHEST TUBE CHECK/CHANGE/REPOSITION/UPSIZE  5/27/2021    IR CHEST TUBE PLACEMENT  5/22/2021    IR THORACENTESIS  8/25/2020    IR THORACENTESIS  2/23/2021    LUNG DECORTICATION Right 6/2/2021    Procedure: DECORTICATION LUNG;  Surgeon: Antoni Guy MD;  [facility];  Service: Thoracic    SPLENECTOMY, TOTAL      THORACOSCOPY VIDEO ASSISTED SURGERY (VATS) Right 6/2/2021    Procedure: THORACOSCOPY VIDEO ASSISTED SURGERY (VATS); Surgeon: Antoni Guy MD;  [facility];  Service: Thoracic    VASCULAR SURGERY Right     leg       Social History     Socioeconomic History    Marital status: /Civil Union     Spouse name: None    Number of children: None    Years of education: None    Highest education level: None   Occupational History    None   Tobacco Use    Smoking status: Current Some Day Smoker     Packs/day: 0 25     Types: Cigarettes    Smokeless tobacco: Never Used    Tobacco comment: smokes 1 pack per week and a half    Vaping Use    Vaping Use: Never used   Substance and Sexual Activity    Alcohol use:  Yes     Alcohol/week: 14 0 standard drinks     Types: 5 Glasses of wine, 5 Cans of beer, 4 Shots of liquor per week     Comment: WEEKLY    Drug use: Never    Sexual activity: Yes   Other Topics Concern    None   Social History Narrative    None Social Determinants of Health     Financial Resource Strain:     Difficulty of Paying Living Expenses:    Food Insecurity:     Worried About Running Out of Food in the Last Year:     [address] in the Last Year:    Transportation Needs:     Lack of Transportation (Medical):  Lack of Transportation (Non-Medical):    Physical Activity:     Days of Exercise per Week:     Minutes of Exercise per Session:    Stress:     Feeling of Stress :    Social Connections:     Frequency of Communication with Friends and Family:     Frequency of Social Gatherings with Friends and Family:     Attends Baptism Services:     Active Member of Clubs or Organizations:     Attends Club or Organization Meetings:     Marital Status:    Intimate Partner Violence:     Fear of Current or Ex-Partner:     Emotionally Abused:     Physically Abused:     Sexually Abused:        No family history on file      No Known Allergies      Current Outpatient Medications:     docusate sodium (COLACE) 100 mg capsule, Take 1 capsule (100 mg total) by mouth 2 (two) times a day as needed for constipation, Disp: , Rfl: 0    ferrous sulfate 324 (65 Fe) mg, Take 1 tablet (324 mg total) by mouth 2 (two) times a day before meals, Disp: 60 tablet, Rfl: 0    glucose blood test strip, Test glucose once a day , Disp: 100 each, Rfl: 3    Lancets (freestyle) lancets, Test glucose once a day , Disp: 100 each, Rfl: 3    metoprolol succinate (TOPROL-XL) 25 mg 24 hr tablet, Take 1 tablet (25 mg total) by mouth 2 (two) times a day, Disp: 60 tablet, Rfl: 0    midodrine (PROAMATINE) 10 MG tablet, Take 1 tablet (10 mg total) by mouth 2 (two) times a day, Disp: 90 tablet, Rfl: 0    polyethylene glycol (MIRALAX) 17 g packet, Take 17 g by mouth daily as needed (constipation), Disp: , Rfl: 0    potassium chloride (K-DUR,KLOR-CON) 20 mEq tablet, Take 3 daily (Patient taking differently: Take 10 mEq by mouth Take 2 tablets (20 meq) two times a day), Disp: , Rfl:     warfarin (COUMADIN) 5 mg tablet, Please start taking 2 5 mg QD, Disp: 135 tablet, Rfl: 0      Physical Exam:  /66   Pulse 84   Temp 98 5 °F (36 9 °C)   Resp 20   Ht 6' 1" (1 854 m)   Wt (!) 139 kg (306 lb)   BMI 40 37 kg/m²     Physical Exam  Constitutional:       Appearance: He is well-developed  He is obese  HENT:      Head: Normocephalic  Eyes:      Pupils: Pupils are equal, round, and reactive to light  Cardiovascular:      Rate and Rhythm: Normal rate  Rhythm irregular  Heart sounds: Murmur heard  Pulmonary:      Effort: Pulmonary effort is normal       Breath sounds: Examination of the right-lower field reveals decreased breath sounds  Examination of the left-lower field reveals decreased breath sounds  Decreased breath sounds present  Chest:      Chest wall: There is dullness to percussion  Comments: Right lower lung  Abdominal:      General: Bowel sounds are normal       Palpations: Abdomen is soft  Tenderness: There is no abdominal tenderness  Musculoskeletal:      Cervical back: Normal range of motion  Right lower leg: Edema present  Left lower leg: Edema present  Skin:     General: Skin is warm  Neurological:      Mental Status: He is alert and oriented to person, place, and time               Labs:  Lab Results   Component Value Date    WBC 4 42 06/29/2021    HGB 7 9 (L) 06/29/2021    HCT 27 0 (L) 06/29/2021    MCV 98 06/29/2021     06/29/2021     Lab Results   Component Value Date     12/16/2015    K 3 7 06/29/2021     06/29/2021    CO2 29 06/29/2021    ANIONGAP 11 12/16/2015    BUN 22 06/29/2021    CREATININE 1 66 (H) 06/29/2021    GLUCOSE 119 06/02/2021    GLUF 136 (H) 06/29/2021    CALCIUM 8 5 06/29/2021    CORRECTEDCA 9 6 06/29/2021    AST 17 06/29/2021    ALT 12 06/29/2021    ALKPHOS 205 (H) 06/29/2021    PROT 7 6 12/16/2015    BILITOT 1 03 (H) 12/16/2015    EGFR 44 06/29/2021

## 2021-07-07 NOTE — ASSESSMENT & PLAN NOTE
Serum sodium levels appropriate, continue with fluid restriction, continue with loop diuretics, follow-up blood work in 1 month

## 2021-07-07 NOTE — PROGRESS NOTES
[facility], [address] [place] [address]    Name: Sofia Mallory  YOB: 1960      Assessment/Plan:    CKD (chronic kidney disease) stage 3, GFR 30-59 ml/min [facility])  Lab Results   Component Value Date    EGFR 44 06/29/2021    EGFR 66 06/09/2021    EGFR 60 06/08/2021    CREATININE 1 66 (H) 06/29/2021    CREATININE 1 18 06/09/2021    CREATININE 1 27 06/08/2021     Patient remains in typical range for kidney function, however, creatinine of 1 66 mg/ dL on the higher end of his typical baseline  Will reassess labs in about 4 weeks  Continue avoid potential nephrotoxins and maximize  Hemodynamic status  Hyponatremia    Serum sodium levels appropriate, continue with fluid restriction, continue with loop diuretics, follow-up blood work in 1 month  Anemia    Patient of dyspnea with exertion, there are several reasons for this, low hemoglobin being 1 potential cause  Will check iron and vitamin B12 stores, previously the patient had iron stores which came back reduced  If they remain at this level he will qualify for iron infusions which we will order  Chronic diastolic CHF (congestive heart failure) (HCC)  Wt Readings from Last 3 Encounters:   07/07/21 (!) 137 kg (303 lb)   06/16/21 88 5 kg (195 lb)   06/09/21 134 kg (296 lb 1 2 oz)         Patient is compensated, he is about 6 lb heavier compared to discharge weight from the hospital   He decided to switch back from Bumex to furosemide because he feels that urine output is better on the furosemide  Continue with potassium supplements  Continue to focus on low-sodium diet, which the patient has taken to heart and has noted that is been very difficult to be on a low-salt diet             Problem List Items Addressed This Visit        Endocrine    Type 2 diabetes mellitus with diabetic chronic kidney disease ([facility] [address] )       Cardiovascular and Mediastinum    Atrial fibrillation (HCC)    Chronic diastolic CHF (congestive heart failure) ([facility] [address] )     Wt Readings from Last 3 Encounters:   07/07/21 (!) 137 kg (303 lb)   06/16/21 88 5 kg (195 lb)   06/09/21 134 kg (296 lb 1 2 oz)         Patient is compensated, he is about 6 lb heavier compared to discharge weight from the hospital   He decided to switch back from Bumex to furosemide because he feels that urine output is better on the furosemide  Continue with potassium supplements  Continue to focus on low-sodium diet, which the patient has taken to heart and has noted that is been very difficult to be on a low-salt diet  Genitourinary    CKD (chronic kidney disease) stage 3, GFR 30-59 ml/min ([facility]) - Primary     Lab Results   Component Value Date    EGFR 44 06/29/2021    EGFR 66 06/09/2021    EGFR 60 06/08/2021    CREATININE 1 66 (H) 06/29/2021    CREATININE 1 18 06/09/2021    CREATININE 1 27 06/08/2021     Patient remains in typical range for kidney function, however, creatinine of 1 66 mg/ dL on the higher end of his typical baseline  Will reassess labs in about 4 weeks  Continue avoid potential nephrotoxins and maximize  Hemodynamic status  Relevant Orders    Comprehensive metabolic panel    CBC and differential    PTH, intact       Other    SOB (shortness of breath)    Hyponatremia       Serum sodium levels appropriate, continue with fluid restriction, continue with loop diuretics, follow-up blood work in 1 month  Anemia       Patient of dyspnea with exertion, there are several reasons for this, low hemoglobin being 1 potential cause  Will check iron and vitamin B12 stores, previously the patient had iron stores which came back reduced  If they remain at this level he will qualify for iron infusions which we will order  Relevant Orders    Iron Panel (Includes Ferritin, Iron Sat%, Iron, and TIBC)    Vitamin B12            Patient is stable at this time, we will see him back for regular appointment in about 2-3 months    Follow-up blood work in the meantime  Will arrange for iron infusions if indicated  Blood work to be done prior to his appointment in 2-3 months  Subjective:      Patient ID: Quiana Loving is a 57 y o  male  Patient presents for follow up  Patient bilateral lower extremity edema is controlled  He is taking diuretics as prescribed, trying to focus on low-sodium diet  Patient is complaining dyspnea with exertion, he was noted to have a hemoglobin around 8 g/dL with most recent labs  He also has hydropneumothorax on the right, he is following with cardiothoracic surgeon for this  Most recent hospitalization was reviewed in May/June 2021  Patient kidney function was excellent upon discharge likely due to hemodynamic optimization  Creatinine at discharge was around 1 1-1 2 mg/ dL, most recent creatinine is 1 6 mg/ dL  There are no significant electrolyte abnormalities  Patient's serum sodium level is controlled  The following portions of the patient's history were reviewed and updated as appropriate: allergies, current medications, past family history, past medical history, past social history, past surgical history and problem list     Review of Systems   All other systems reviewed and are negative  Social History     Socioeconomic History    Marital status: /Civil Union     Spouse name: Not on file    Number of children: Not on file    Years of education: Not on file    Highest education level: Not on file   Occupational History    Not on file   Tobacco Use    Smoking status: Current Some Day Smoker     Packs/day: 0 25     Types: Cigarettes    Smokeless tobacco: Never Used    Tobacco comment: smokes 1 pack per week and a half    Vaping Use    Vaping Use: Never used   Substance and Sexual Activity    Alcohol use:  Yes     Alcohol/week: 14 0 standard drinks     Types: 5 Glasses of wine, 5 Cans of beer, 4 Shots of liquor per week     Comment: WEEKLY    Drug use: Never    Sexual activity: Yes   Other Topics Concern    Not on file   Social History Narrative    Not on file     Social Determinants of Health     Financial Resource Strain:     Difficulty of Paying Living Expenses:    Food Insecurity:     Worried About Running Out of Food in the Last Year:     [address] in the Last Year:    Transportation Needs:     Lack of Transportation (Medical):  Lack of Transportation (Non-Medical):    Physical Activity:     Days of Exercise per Week:     Minutes of Exercise per Session:    Stress:     Feeling of Stress :    Social Connections:     Frequency of Communication with Friends and Family:     Frequency of Social Gatherings with Friends and Family:     Attends [employer] Services:     Active Member of Clubs or Organizations:     Attends Club or Organization Meetings:     Marital Status:    Intimate Partner Violence:     Fear of Current or Ex-Partner:     Emotionally Abused:     Physically Abused:     Sexually Abused:      Past Medical History:   Diagnosis Date    A-fib ([place] [address] )     Acute renal failure superimposed on stage 3b chronic kidney disease ([place] [address] ) 3/15/2021    Cardiac disease     Chronic combined systolic (congestive) and diastolic (congestive) heart failure ([place] [address] )     Class 3 severe obesity due to excess calories with serious comorbidity and body mass index (BMI) of 45 0 to 49 9 in adult ([place] [address] ) 10/17/2018    Diabetes mellitus ([place] [address] )     Dilated cardiomyopathy ([place] [address] )     History of echocardiogram 11/24/2014    EF 0 30 (30%), Likely mod LV systolic dysfunction  Likely RV dysfunction as well   History of Lexiscan MPI 02/19/2016    EF 0 43 (43%), no prior MI or ischemia   Hx of echocardiogram 04/28/2017    Normal EF, Normal LV systolic function  Mild concentric LV hypertrophy  Mild mitral and tricuspid regurg      Hx: recurrent pneumonia     Hypertension     Hypokalemia 9/4/2020    Long term (current) use of anticoagulants     Morbid (severe) obesity due to excess calories ([facility] [address] )     Neuropathy      Past Surgical History:   Procedure Laterality Date    HERNIA REPAIR      IR CHEST TUBE CHECK/CHANGE/REPOSITION/UPSIZE  5/27/2021    IR CHEST TUBE PLACEMENT  5/22/2021    IR THORACENTESIS  8/25/2020    IR THORACENTESIS  2/23/2021    LUNG DECORTICATION Right 6/2/2021    Procedure: DECORTICATION LUNG;  Surgeon: Flavio Corrales MD;  [facility];  Service: Thoracic    SPLENECTOMY, TOTAL      THORACOSCOPY VIDEO ASSISTED SURGERY (VATS) Right 6/2/2021    Procedure: THORACOSCOPY VIDEO ASSISTED SURGERY (VATS);   Surgeon: Flavio Corrales MD;  [facility];  Service: Thoracic    VASCULAR SURGERY Right     leg       Current Outpatient Medications:     docusate sodium (COLACE) 100 mg capsule, Take 1 capsule (100 mg total) by mouth 2 (two) times a day as needed for constipation, Disp: , Rfl: 0    ferrous sulfate 324 (65 Fe) mg, Take 1 tablet (324 mg total) by mouth 2 (two) times a day before meals, Disp: 60 tablet, Rfl: 0    glucose blood test strip, Test glucose once a day , Disp: 100 each, Rfl: 3    Lancets (freestyle) lancets, Test glucose once a day , Disp: 100 each, Rfl: 3    metoprolol succinate (TOPROL-XL) 25 mg 24 hr tablet, Take 1 tablet (25 mg total) by mouth 2 (two) times a day, Disp: 60 tablet, Rfl: 0    midodrine (PROAMATINE) 10 MG tablet, Take 1 tablet (10 mg total) by mouth 2 (two) times a day, Disp: 90 tablet, Rfl: 0    polyethylene glycol (MIRALAX) 17 g packet, Take 17 g by mouth daily as needed (constipation), Disp: , Rfl: 0    potassium chloride (K-DUR,KLOR-CON) 20 mEq tablet, Take 3 daily (Patient taking differently: Take 10 mEq by mouth Take 2 tablets (20 meq) two times a day), Disp: , Rfl:     warfarin (COUMADIN) 5 mg tablet, Please start taking 2 5 mg QD, Disp: 135 tablet, Rfl: 0    Lab Results   Component Value Date     12/16/2015    SODIUM 138 06/29/2021    K 3 7 06/29/2021     06/29/2021    CO2 29 06/29/2021    ANIONGAP 11 12/16/2015    AGAP 8 06/29/2021    BUN 22 06/29/2021    CREATININE 1 66 (H) 06/29/2021    GLUC 104 06/09/2021    GLUF 136 (H) 06/29/2021    CALCIUM 8 5 06/29/2021    AST 17 06/29/2021    ALT 12 06/29/2021    ALKPHOS 205 (H) 06/29/2021    PROT 7 6 12/16/2015    TP 8 1 06/29/2021    BILITOT 1 03 (H) 12/16/2015    TBILI 0 68 06/29/2021    EGFR 44 06/29/2021     Lab Results   Component Value Date    WBC 4 42 06/29/2021    HGB 7 9 (L) 06/29/2021    HCT 27 0 (L) 06/29/2021    MCV 98 06/29/2021     06/29/2021     Lab Results   Component Value Date    CHOLESTEROL 143 03/05/2020    CHOLESTEROL 125 07/31/2018     Lab Results   Component Value Date    HDL 41 03/05/2020    HDL 29 (L) 07/31/2018     Lab Results   Component Value Date    LDLCALC 73 03/05/2020    LDLCALC 65 07/31/2018     Lab Results   Component Value Date    TRIG 144 03/05/2020    TRIG 157 (H) 07/31/2018     No results found for: [place], [place]  Lab Results   Component Value Date    YOO7XCBHXCSU 2 002 07/31/2018     Lab Results   Component Value Date    PTH 55 1 03/11/2021    CALCIUM 8 5 06/29/2021    PHOS 5 2 (H) 05/21/2021     No results found for: SPEP, UPEP  No results found for: ANKITAALOLAYINKA LALA4HUR        Objective:      /76   Pulse (!) 130   Ht 6' 1" (1 854 m)   Wt (!) 137 kg (303 lb)   SpO2 97%   BMI 39 98 kg/m²          Physical Exam  Vitals reviewed  Constitutional:       General: He is not in acute distress  Appearance: He is well-developed  HENT:      Head: Normocephalic and atraumatic  Eyes:      Conjunctiva/sclera: Conjunctivae normal    Cardiovascular:      Rate and Rhythm: Normal rate and regular rhythm  Pulmonary:      Effort: Pulmonary effort is normal       Breath sounds: Normal breath sounds  Abdominal:      Palpations: Abdomen is soft  Musculoskeletal:         General: Swelling (mild bilateral LE edema) present  Cervical back: Neck supple  Skin:     General: Skin is warm  Findings: No rash  Neurological:      Mental Status: He is alert and oriented to person, place, and time  Cranial Nerves: No cranial nerve deficit     Psychiatric:         Behavior: Behavior normal

## 2021-07-07 NOTE — ASSESSMENT & PLAN NOTE
Wt Readings from Last 3 Encounters:   07/07/21 (!) 137 kg (303 lb)   06/16/21 88 5 kg (195 lb)   06/09/21 134 kg (296 lb 1 2 oz)         Patient is compensated, he is about 6 lb heavier compared to discharge weight from the hospital   He decided to switch back from Bumex to furosemide because he feels that urine output is better on the furosemide  Continue with potassium supplements  Continue to focus on low-sodium diet, which the patient has taken to heart and has noted that is been very difficult to be on a low-salt diet

## 2021-07-07 NOTE — ASSESSMENT & PLAN NOTE
Patient of dyspnea with exertion, there are several reasons for this, low hemoglobin being 1 potential cause  Will check iron and vitamin B12 stores, previously the patient had iron stores which came back reduced  If they remain at this level he will qualify for iron infusions which we will order

## 2021-07-22 NOTE — PATIENT INSTRUCTIONS
Pt presented for INR today      Decrease to 2 5 mg tomorrow, then resume usual dosing  Recheck INR in 2 weeks  LMVM with instructions   TRES Herring

## 2021-08-02 NOTE — PROGRESS NOTES
Spoke with patient to start telemedicine visit, which he had to cancel secondary to "excruciating pain from constipation"  He asked to reschedule to later this week  I told him my office would call to reschedule, but that he will need a cxr prior to the appt  He said he would go for one tomorrow

## 2021-08-04 NOTE — PROGRESS NOTES
[facility] - Outpatient Follow-up Note  Lucillejon Fairchild 57 y o  male MRN: [id number]  Encounter: [id number]          ASSESSMENT AND PLAN:  18-year-old male with history of type 2 diabetes, STEFF, atrial fibrillation on Coumadin, cirrhosis who presents for follow-up evaluation  1  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 ascites, unspecified hepatic cirrhosis type ([address] )  2  Pleural effusion, right  He was found to have a nodular appearing liver on prior imaging concerning for cirrhosis  His other laboratory data including total bilirubin, platelets are not consistent with chronic liver disease  INR is elevated due to Coumadin use  He was recently admitted for recurrent right-sided pleural effusion for which he required a VATS procedure  It is unclear if he has chronic liver disease given his inconsistent labs  He does have risk factors for chronic liver disease including alcoholic and nonalcoholic fatty use risk factors in addition to the possible erosive  He did have a elastography previously showing F3 fibrosis  If he does have cirrhosis fusion may be due to a hepatic hydrothorax  If he has only advanced fibrosis the pleural effusion with other causes  We discussed that if his effusion is related to hepatic hydrothorax that this is managed similar to ascites with diuretics and low-sodium diet  Given his comorbidities and elevated BMI he is not a liver transplant candidate  Other option would TIPS placement if he requires frequent thoracenteses in the future  He would benefit from IR liver biopsy with transjugular pressure measurements for definitive diagnosis of his cirrhosis  He continues to report intermittent shortness of breath is following with thoracic and cardiology closely we will defer to scheduling IR liver biopsy and pressure measurements  MELD 23 (recurrent by chronic kidney disease and elevated INR due to Coumadin   Portal hypertension:  October 2020 EGD without varices  Consider repeat in 2 years pending liver biopsy results  Ascites:  None on recent imaging  He remains on diuretics for his cardiac disease managed per Cardiology   Hepatic encephalopathy:  No history  HCC screening:  AFP is up-to-date from this year  He is scheduled for abdominal ultrasound this month  Anemia: baseline Hgb is 7-8  He has history of colonic avms in the past requiring apc  Anemia is also likely contributed by CKD  Continue to monitor  If downtrending Hgb consider repeat colonoscopy + push enteroscopy    Follow-up in 2 months      Anemia:   ______________________________________________________________________    SUBJECTIVE:  18-year-old male with history of type 2 diabetes, STEFF, atrial fibrillation on Coumadin, cirrhosis who presents for follow-up evaluation  He was admitted in May of this year to [facility] for recurrent right-sided pleural effusion and underwent thoracentesis and chest tube placement  He continued to have bloody output from the chest to resulting in anemia requiring blood transfusion  He was then transferred to [facility] for evaluation by thoracic surgery  He underwent VATS procedure June 2nd  He was scheduled for follow-up with thoracic recently but was not able to attend the telemedicine visit  Repeat chest x-ray is ordered and pending  He reports constipation for which he uses Metamucil daily  His status improved since the VATS procedure but he continues to have intermittent shortness of breath with exertion  He denies chest pain appetite is moderate  Portal hypertension:  October 2020 EGD showed small hiatal hernia regular Z-line no evidence of esophageal or gastric varices    Ascites:  He remains on diuretics, Lasix managed by his cardiologist   He reports no increased abdominal distension  Hepatic encephalopathy:  No history  HCC screening:  Contrasted CT February 2020 with no focal liver lesion    May 2021 AFP is 2  7 October 2020 colonoscopy showed multiple small large AVMs in the cecum, ascending, transverse and sigmoid colon which were treated with APC and a small ascending colon polyp  INR is 2 6 in July on Coumadin  June 2021 labs show total bilirubin 0 6, alkaline phosphatase 205 August 2021 CBC shows hemoglobin 7 8, platelets 666    Elastography June 2021 showed F3 or severe fibrosis  Weight is good  His appetite is moderate his  REVIEW OF SYSTEMS IS OTHERWISE NEGATIVE  Review of systems negative other than stated as per HPI    Historical Information   Past Medical History:   Diagnosis Date    A[facility]fib [facility])     Acute renal failure superimposed on stage 3b chronic kidney disease ([facility] [address] ) 3/15/2021    Cardiac disease     Chronic combined systolic (congestive) and diastolic (congestive) heart failure (HCC)     Class 3 severe obesity due to excess calories with serious comorbidity and body mass index (BMI) of 45 0 to 49 9 in adult [facility]) 10/17/2018    Diabetes mellitus ([facility] [address] )     [facility]     History of echocardiogram 11/24/2014    EF 0 30 (30%), Likely mod LV systolic dysfunction  Likely RV dysfunction as well   History of Lexiscan MPI 02/19/2016    EF 0 43 (43%), no prior MI or ischemia   Hx of echocardiogram 04/28/2017    Normal EF, Normal LV systolic function  Mild concentric LV hypertrophy  Mild mitral and tricuspid regurg      Hx: recurrent pneumonia     Hypertension     Hypokalemia 9/4/2020    Long term (current) use of anticoagulants     Morbid (severe) obesity due to excess calories ([facility] [address] )     Neuropathy      Past Surgical History:   Procedure Laterality Date    HERNIA REPAIR      IR CHEST TUBE CHECK/CHANGE/REPOSITION/UPSIZE  5/27/2021    IR CHEST TUBE PLACEMENT  5/22/2021    IR THORACENTESIS  8/25/2020    IR THORACENTESIS  2/23/2021    LUNG DECORTICATION Right 6/2/2021    Procedure: DECORTICATION LUNG;  Surgeon: Lore Boyd MD;  [facility]; Service: Thoracic    SPLENECTOMY, TOTAL      THORACOSCOPY VIDEO ASSISTED SURGERY (VATS) Right 6/2/2021    Procedure: THORACOSCOPY VIDEO ASSISTED SURGERY (VATS); Surgeon: Khushboo Silva MD;  [facility];  Service: Thoracic    VASCULAR SURGERY Right     leg     Social History   Social History     Substance and Sexual Activity   Alcohol Use Yes    Alcohol/week: 14 0 standard drinks    Types: 5 Glasses of wine, 5 Cans of beer, 4 Shots of liquor per week    Comment: WEEKLY     Social History     Substance and Sexual Activity   Drug Use Never     Social History     Tobacco Use   Smoking Status Current Some Day Smoker    Packs/day: 0 25    Types: Cigarettes   Smokeless Tobacco Never Used   Tobacco Comment    smokes 1 pack per week and a half      No family history on file  Meds/Allergies       Current Outpatient Medications:     docusate sodium (COLACE) 100 mg capsule    glucose blood test strip    Lancets (freestyle) lancets    metoprolol succinate (TOPROL-XL) 25 mg 24 hr tablet    midodrine (PROAMATINE) 10 MG tablet    polyethylene glycol (MIRALAX) 17 g packet    potassium chloride (K-DUR,KLOR-CON) 20 mEq tablet    warfarin (COUMADIN) 5 mg tablet    ferrous sulfate 324 (65 Fe) mg    No Known Allergies        Objective     Blood pressure 100/68, pulse 100, temperature (!) 97 3 °F (36 3 °C), temperature source Tympanic, height 6' 1" (1 854 m), weight (!) 138 kg (305 lb)  Body mass index is 40 24 kg/m²  PHYSICAL EXAM:      General Appearance:   Alert, cooperative, no distress   HEENT:   Normocephalic, atraumatic, anicteric  Neck:  Supple, symmetrical, trachea midline   Lungs:   Decreased lung sounds at this bilaterally  Heart[de-identified]   Regular rate and rhythm;    Abdomen:   Soft, non-tender, non-distended; normal bowel sounds; no masses, no organomegaly    Genitalia:   Deferred    Rectal:   Deferred    Extremities:  No cyanosis, clubbing or edema    Pulses:  2+ and symmetric    Skin: No jaundice, rashes, or lesions    Lymph nodes:  No palpable cervical lymphadenopathy        Lab Results:   No visits with results within 1 Day(s) from this visit  Latest known visit with results is:   Appointment on 08/03/2021   Component Date Value    WBC 08/03/2021 6 47     RBC 08/03/2021 2 82*    Hemoglobin 08/03/2021 7 8*    Hematocrit 08/03/2021 25 8*    MCV 08/03/2021 92     MCH 08/03/2021 27 7     MCHC 08/03/2021 30 2*    RDW 08/03/2021 15 1     MPV 08/03/2021 10 0     Platelets 14/89/2070 266     nRBC 08/03/2021 0     Neutrophils Relative 08/03/2021 76*    Immat GRANS % 08/03/2021 1     Lymphocytes Relative 08/03/2021 10*    Monocytes Relative 08/03/2021 10     Eosinophils Relative 08/03/2021 2     Basophils Relative 08/03/2021 1     Neutrophils Absolute 08/03/2021 4 99     Immature Grans Absolute 08/03/2021 0 04     Lymphocytes Absolute 08/03/2021 0 62     Monocytes Absolute 08/03/2021 0 65     Eosinophils Absolute 08/03/2021 0 10     Basophils Absolute 08/03/2021 0 07     PTH 08/03/2021 70 6     Vitamin B-12 08/03/2021 611     Iron Saturation 08/03/2021 8     TIBC 08/03/2021 385     Iron 08/03/2021 29*    Ferritin 08/03/2021 100          Radiology Results:   No results found

## 2021-08-11 PROBLEM — E61.1 IRON DEFICIENCY: Status: ACTIVE | Noted: 2021-01-01

## 2021-08-12 NOTE — PATIENT INSTRUCTIONS
Pt presented for INR today  Hold today; reduce dose to 2 5 mg on Monday    Recheck INR in 1 weeks  Instructions given to pt's wife by phone   Dhruv Kohler, Brando Melendez

## 2021-08-16 NOTE — TELEPHONE ENCOUNTER
Patient called to have refill on furosemide 80 mg bid  He saw Dr Harjeet Leo on 7/7/21, who okay'd patient to restart at 80 mg bid as his kidney function is okay and his urine output is better on furosemide

## 2021-08-17 NOTE — RESULT ENCOUNTER NOTE
US showed cirrhosis & fatty liver  No liver mass  There is some gallbladder wall thickening which may be related to the liver but if he is having pain we can refer to surgery    Jian First

## 2021-08-20 NOTE — TELEPHONE ENCOUNTER
[facility]  Sancho Jaramillo is sure if he should still be taking his potassium 20 mg, if so he needs a script sent in to [facility] in [place]

## 2021-08-23 NOTE — PATIENT INSTRUCTIONS
Pt presented for INR today  No changes necessary; continue current dosing schedule    Recheck INR in 3 weeks  TRES [place]ley

## 2021-08-31 NOTE — TELEPHONE ENCOUNTER
Transferring Call to Another Office   Who is Calling  Patient   Wants to speak with Diogo Blevins   Transferred to [facility]   Result

## 2021-09-02 NOTE — ASSESSMENT & PLAN NOTE
Ravi Horton is a 57 y o  male Who underwent  A right thoracoscopic complete Ord decortication on June 2, 2021  He presents today  For a telephone visit to discuss his progress  Today during the visit, I am overall happy with his progress  He seems to have very few symptoms  We did discuss his chest x-ray  He does have a persistent right pleural effusion  I have offered him a thoracentesis to see if this will improve his lung expansion  He is willing to undergo this  We will schedule this for him now  I have advised him to get a chest x-ray 2-3 days after the thoracentesis and I will call him to discuss those results      Osorio Quinn MD  Thoracic Surgery  (Available on [employer] Text)  Office: [phone]

## 2021-09-02 NOTE — PROGRESS NOTES
Virtual Brief Visit    Verification of patient location:    Patient is located in the following state in which I hold an active license [place]      Assessment/Plan:    Problem List Items Addressed This Visit        Respiratory    Pleural effusion, right     Richard Mercado is a 57 y o  male Who underwent  A right thoracoscopic complete Ord decortication on June 2, 2021  He presents today  For a telephone visit to discuss his progress  Today during the visit, I am overall happy with his progress  He seems to have very few symptoms  We did discuss his chest x-ray  He does have a persistent right pleural effusion  I have offered him a thoracentesis to see if this will improve his lung expansion  He is willing to undergo this  We will schedule this for him now  I have advised him to get a chest x-ray 2-3 days after the thoracentesis and I will call him to discuss those results  Elida Archer MD  Thoracic Surgery  (Available on [employer] Text)  Office: [phone]             Other Visit Diagnoses     Pleural effusion    -  Primary    Relevant Orders    IR OUT-Patient Thoracentesis    XR chest pa & lateral                Reason for visit is   Chief Complaint   Patient presents with    Virtual Brief Visit        Encounter provider Riaz Valdes MD    Provider located at [address] [place] [id number] [phone]    Recent Visits  Date Type Provider Dept   08/31/21 Telephone Brooklynn Handler [facility]   Showing recent visits within past 7 days and meeting all other requirements  Today's Visits  Date Type Provider Dept   09/02/21 Kia Sims MD [address] [place] [address] today's visits and meeting all other requirements  Future Appointments  No visits were found meeting these conditions    Showing future appointments within next 150 days and meeting all other requirements       After connecting through telephone, the patient was identified by name and date of birth  Rose Parkinson was informed that this is a telemedicine visit and that the visit is being conducted through telephone  My office door was closed  No one else was in the room  He acknowledged consent and understanding of privacy and security of the platform  The patient has agreed to participate and understands he can discontinue the visit at any time  Patient is aware this is a billable service  Thoracic History       Diagnosis: Right pleural effusion        Procedure: Right thoracoscopic complete decortication on 6/2/21  Pathology: All cultures were negative     Subjective    Rose Parkinson is a 57 y o  male Who underwent  A right thoracoscopic complete Ord decortication on June 2, 2021  He presents today  For a telephone visit to discuss his progress  Today during the visit, he states that he is doing well  He denies any significant shortness of breath  He says that he can make it up the stairs without getting winded and he has been swimming without difficulty  I have personally reviewed his chest x-ray in PACS  This does demonstrate a persistent right pleural effusion, however, his lung is improved in expansion and aeration since his last chest x-ray 1 month ago  He denies any fevers or chills  He does report an incidence of hypoglycemia 2 days ago but has not experienced this since  Alexis GRAVES     Past Medical History:   Diagnosis Date    A-fib [facility])     Acute renal failure superimposed on stage 3b chronic kidney disease ([facility] ) 03/15/2021    Cardiac disease     Chronic combined systolic (congestive) and diastolic (congestive) heart failure (HCC)     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 ascites (HCC)     Class 3 severe obesity due to excess calories with serious comorbidity and body mass index (BMI) of 45 0 to 49 9 in adult [facility]) 10/17/2018    Diabetes mellitus ([facility] )     Dilated cardiomyopathy ([facility] )     History of echocardiogram 11/24/2014    EF 0 30 (30%), Likely mod LV systolic dysfunction  Likely RV dysfunction as well   History of Lexiscan MPI 02/19/2016    EF 0 43 (43%), no prior MI or ischemia   Hx of echocardiogram 04/28/2017    Normal EF, Normal LV systolic function  Mild concentric LV hypertrophy  Mild mitral and tricuspid regurg   Hx: recurrent pneumonia     Hypertension     Hypokalemia 09/04/2020    Long term (current) use of anticoagulants     Morbid (severe) obesity due to excess calories (HCC)     Neuropathy     Pleural effusion on right     Stage 3 chronic kidney disease [facility])        Past Surgical History:   Procedure Laterality Date    HERNIA REPAIR      IR CHEST TUBE CHECK/CHANGE/REPOSITION/UPSIZE  5/27/2021    IR CHEST TUBE PLACEMENT  5/22/2021    IR THORACENTESIS  8/25/2020    IR THORACENTESIS  2/23/2021    LUNG DECORTICATION Right 6/2/2021    Procedure: DECORTICATION LUNG;  Surgeon: Miranda Ambrose MD;  [facility];  Service: Thoracic    SPLENECTOMY, TOTAL      THORACOSCOPY VIDEO ASSISTED SURGERY (VATS) Right 6/2/2021    Procedure: THORACOSCOPY VIDEO ASSISTED SURGERY (VATS); Surgeon: Miranda Ambrose MD;  [facility];  Service: Thoracic    VASCULAR SURGERY Right     leg       Current Outpatient Medications   Medication Sig Dispense Refill    docusate sodium (COLACE) 100 mg capsule Take 1 capsule (100 mg total) by mouth 2 (two) times a day as needed for constipation  0    ferrous sulfate 324 (65 Fe) mg Take 1 tablet (324 mg total) by mouth 2 (two) times a day before meals (Patient not taking: Reported on 8/4/2021) 60 tablet 0    furosemide (LASIX) 80 mg tablet Take 1 tablet (80 mg total) by mouth 2 (two) times a day 60 tablet 5    glucose blood test strip Test glucose once a day  100 each 3    Lancets (freestyle) lancets Test glucose once a day   100 each 3    metoprolol succinate (TOPROL-XL) 25 mg 24 hr tablet TAKE 1 TABLET BY MOUTH TWICE A DAY 60 tablet 0    midodrine (PROAMATINE) 10 MG tablet Take 1 tablet (10 mg total) by mouth 2 (two) times a day 90 tablet 0    polyethylene glycol (MIRALAX) 17 g packet Take 17 g by mouth daily as needed (constipation)  0    potassium chloride (K-DUR,KLOR-CON) 20 mEq tablet Take 1 tablet (20 mEq total) by mouth 2 (two) times a day 60 tablet 5    warfarin (COUMADIN) 5 mg tablet Please start taking 2 5 mg  tablet 0     No current facility-administered medications for this visit  No Known Allergies    Review of Systems   Constitutional: Negative  Negative for activity change, chills, fatigue, fever and unexpected weight change  HENT: Negative for sore throat, trouble swallowing and voice change  Eyes: Negative  Negative for visual disturbance  Respiratory: Negative for cough, chest tightness, shortness of breath, wheezing and stridor  Cardiovascular: Negative for chest pain and leg swelling  Gastrointestinal: Negative  Endocrine: Negative  Genitourinary: Negative  Musculoskeletal: Negative  Skin: Negative  Allergic/Immunologic: Negative  Neurological: Negative for seizures, syncope, speech difficulty, weakness, light-headedness and headaches  Hematological: Negative for adenopathy  Psychiatric/Behavioral: Negative  There were no vitals filed for this visit  I spent 15 minutes directly with the patient during this visit    VIRTUAL VISIT [address] verbally agrees to participate in [employer]  Pt is aware that [employer] could be limited without vital signs or the ability to perform a full hands-on physical Rosariomaddi Omalley understands he or the provider may request at any time to terminate the video visit and request the patient to seek care or treatment in person

## 2021-09-02 NOTE — PLAN OF CARE
Problem: INFECTION - ADULT  Goal: Absence or prevention of progression during hospitalization  Description: INTERVENTIONS:  - Assess and monitor for signs and symptoms of infection  - Monitor lab/diagnostic results  - Monitor all insertion sites, i e  indwelling lines, tubes, and drains  - Monitor endotracheal if appropriate and nasal secretions for changes in amount and color  - [place] appropriate cooling/warming therapies per order  - Administer medications as ordered  - Instruct and encourage patient and family to use good hand hygiene technique  - Identify and instruct in appropriate isolation precautions for identified infection/condition  Outcome: Progressing     Problem: Knowledge Deficit  Goal: Patient/family/caregiver demonstrates understanding of disease process, treatment plan, medications, and discharge instructions  Description: Complete learning assessment and assess knowledge base    Interventions:  - Provide teaching at level of understanding  - Provide teaching via preferred learning methods  Outcome: Progressing

## 2021-09-13 PROBLEM — R65.21 SEPTIC SHOCK (HCC): Status: ACTIVE | Noted: 2021-01-01

## 2021-09-13 PROBLEM — A41.9 SEPSIS (HCC): Status: ACTIVE | Noted: 2021-01-01

## 2021-09-13 PROBLEM — I48.91 ATRIAL FIBRILLATION (HCC): Status: ACTIVE | Noted: 2021-02-22

## 2021-09-13 PROBLEM — M79.3 PANNICULITIS: Status: ACTIVE | Noted: 2021-01-01

## 2021-09-13 PROBLEM — A41.9 SEPTIC SHOCK (HCC): Status: ACTIVE | Noted: 2021-01-01

## 2021-09-13 PROBLEM — I48.91 ATRIAL FIBRILLATION (HCC): Status: RESOLVED | Noted: 2018-10-17 | Resolved: 2021-01-01

## 2021-09-13 PROBLEM — I50.812 CHRONIC RIGHT-SIDED CONGESTIVE HEART FAILURE (HCC): Status: ACTIVE | Noted: 2021-01-01

## 2021-09-13 NOTE — ASSESSMENT & PLAN NOTE
· Known past medical history secondary to alcoholic and nonalcoholic fatty liver disease  · Follows outpatient with Gastroenterology; last seen on 08/04/2021  · Not a candidate for liver transplant given BMI and comorbidities  · Avoid hepatotoxic medications    · Serial lab monitoring

## 2021-09-13 NOTE — PROGRESS NOTES
Vancomycin Assessment    Chio Nascimento is a 57 y o  male who is currently receiving vancomycin 2250mg q12h for skin-soft tissue infection     Relevant clinical data and objective history reviewed:  Creatinine   Date Value Ref Range Status   09/13/2021 2 06 (H) 0 60 - 1 30 mg/dL Final     Comment:     Standardized to IDMS reference method   06/29/2021 1 66 (H) 0 60 - 1 30 mg/dL Final     Comment:     Standardized to IDMS reference method   06/09/2021 1 18 0 60 - 1 30 mg/dL Final     Comment:     Standardized to IDMS reference method   12/16/2015 1 14 0 60 - 1 30 mg/dL Final     Comment:     Standardized to IDMS reference method   06/07/2015 0 83 0 60 - 1 30 mg/dL Final     Comment:     Standardized to IDMS reference method   06/06/2015 0 73 0 60 - 1 30 mg/dL Final     Comment:     Standardized to IDMS reference method     /59 (BP Location: Left arm)   Pulse (!) 133   Temp 98 8 °F (37 1 °C) (Tympanic)   Resp (!) 31   Ht 6' (1 829 m)   Wt (!) 152 kg (335 lb 12 2 oz)   SpO2 96%   BMI 45 54 kg/m²   No intake/output data recorded  Lab Results   Component Value Date/Time    BUN 48 (H) 09/13/2021 10:09 AM    BUN 16 12/16/2015 07:25 AM    WBC 18 40 (H) 09/13/2021 10:09 AM    WBC 6 05 12/16/2015 07:25 AM    HGB 7 4 (L) 09/13/2021 10:09 AM    HGB 13 5 12/16/2015 07:25 AM    HCT 23 9 (L) 09/13/2021 10:09 AM    HCT 40 5 12/16/2015 07:25 AM    MCV 93 09/13/2021 10:09 AM     (H) 12/16/2015 07:25 AM     09/13/2021 10:09 AM     (L) 12/16/2015 07:25 AM     Temp Readings from Last 3 Encounters:   09/13/21 98 8 °F (37 1 °C) (Tympanic)   09/02/21 97 8 °F (36 6 °C) (Tympanic)   08/26/21 97 9 °F (36 6 °C) (Tympanic)     Vancomycin Days of Therapy: 1    Assessment/Plan  The patient is currently on vancomycin utilizing scheduled dosing based on adjusted body weight (due to obesity)  Baseline risks associated with therapy include: pre-existing renal impairment, advanced age, and dehydration    The patient is currently receiving 2250mg q12h and after clinical evaluation will be changed to 1500mg q12h  Pharmacy will also follow closely for s/sx of nephrotoxicity, infusion reactions, and appropriateness of therapy  BMP and CBC will be ordered per protocol  Plan for trough as patient approaches steady state, prior to the 4th  dose at approximately 1030 on 9/15/21  Due to infection severity, will target a trough of 15-20 (appropriate for most indications)   Pharmacy will continue to follow the patients culture results and clinical progress daily      Christen Hernández, Pharmacist

## 2021-09-13 NOTE — ASSESSMENT & PLAN NOTE
This is a chronic condition  Due to Right lung hematomas  Following up with thoracic surgery  History of for sessions of thoracentesis within the past year  CT chest 09/13/2021: Large chronic right pleural effusion with probable partial loculation at the anterolateral base  Unchanged round atelectasis of the adjacent basilar right middle and lower lobes  No acute consolidations    His upcoming thoracentesis session was scheduled for tomorrow  Interventional Cardiology consulted, input highly appreciated

## 2021-09-13 NOTE — ASSESSMENT & PLAN NOTE
· Known past medical history with CPAP compliance at home  · Continue CPAP HS per home regimen/settings

## 2021-09-13 NOTE — ASSESSMENT & PLAN NOTE
Wt Readings from Last 3 Encounters:   09/13/21 (!) 152 kg (335 lb 12 2 oz)   08/04/21 (!) 138 kg (305 lb)   07/07/21 (!) 139 kg (306 lb)     This is a chronic condition  Patient follows up with [facility] (Dr wiley)  Patient euvolemic on exam  Monitor I/os and daily weight  Telemetry and vital signs  Am labs

## 2021-09-13 NOTE — ASSESSMENT & PLAN NOTE
Lab Results   Component Value Date    EGFR 34 09/13/2021    EGFR 44 06/29/2021    EGFR 66 06/09/2021    CREATININE 2 06 (H) 09/13/2021    CREATININE 1 66 (H) 06/29/2021    CREATININE 1 18 06/09/2021   Presented with BUN 48, CR:  2  GFR:  34   His baseline:  BUN 25, CR:  1 3  GFR:  34      Urinalysis 09/13/2021:  negative       4 L of IV fluids were given in the ED  1 L of IV normal saline started after admission to be given as of 125 mph  Reassessed the patient in the morning  CBC, CMP a m

## 2021-09-13 NOTE — ASSESSMENT & PLAN NOTE
· Known past medical history  · Follows OP with Cardiology physician Dr Chavez  · Managed as an outpatient on 80 mg Lasix BID with K supplementation  · Echo from 02/22/2021 with ejection fraction of 60%     · Noted severe lower extremity edema likely in the setting of hypoalbuminemia as well as chronic venous insufficiency  · CT imaging with mild pulmonary interstitial edema without alveolar consolidations  · IV diuretics on hold in the setting of shock  · Daily weight  · Intake and output  · Cardiac/diabetic/2 g sodium/fluid restriction diet  · Given need for IV hydration in the setting of sepsis monitor volume status very closely

## 2021-09-13 NOTE — ASSESSMENT & PLAN NOTE
Lab Results   Component Value Date    EGFR 37 09/13/2021    EGFR 34 09/13/2021    EGFR 44 06/29/2021    CREATININE 1 89 (H) 09/13/2021    CREATININE 2 06 (H) 09/13/2021    CREATININE 1 66 (H) 06/29/2021   · Improving with IVF; Present on admission as evidence by creatinine of 2 06  · Superimposed on CKD stage 3  · Baseline appears to be approximately 1 31 6 on review of record  · Follows outpatient with Nephrology Dr Harjeet Leo   · In the setting of hypotension/infection  · Urinalysis unremarkable  · 4 L of IV fluids were given in the ED; continues IV fluids for now  · Avoid nephrotoxins, NSAIDs, and hypotension of possible  · Renally dose medications as appropriate  · Monitor with morning labs; trend as above

## 2021-09-13 NOTE — ASSESSMENT & PLAN NOTE
Background:  Presented to the ED with lower abdominal pain over the past 1 day     Criteria met on admission:  Tachycardia, tachypnea, hypotension, leukocytosis and fever  o With associated organ dysfunction as evidence by creatinine elevation  o Thought to be in the setting of panniculitis   CT abdomen and pelvis "Disproportionate dermal thickening and subcutaneous edema in the ventral lower pannus without encapsulated collections, soft tissue gas, or inflammatory changes extending into the perineum "   WBC of 18 4; trend as noted below   IV Fluids per sepsis protocol; continuous overnight   IV antibiotics with cefepime and vancomycin  o Continue broad-spectrum and follow up with cultures and deescalate as appropriate   Vasopressor with Levophed  o CVL placed in right IJ   Continuous cardiopulmonary monitoring   Blood cultures pending   UA unremarkable   Procalcitonin elevated 3 89; continue to trend    Admitted to critical care service; awaiting evaluation   Daily weight   Intake and output   Serial lab monitoring   Pharmacy consulted for assist with vancomycin dosing    Lab Results   Component Value Date    WBC 17 97 (H) 09/14/2021    WBC 18 40 (H) 09/13/2021    WBC 6 47 08/03/2021

## 2021-09-13 NOTE — ASSESSMENT & PLAN NOTE
Serum sodium 127 upon presentation  4 L of IV Ringer's lactate was given  1 L IV normal saline is started and to be infused on 01/20 5 mph  Recheck CMP in the morning

## 2021-09-13 NOTE — ASSESSMENT & PLAN NOTE
· With known permanent atrial fibrillation; presented with RVR  · Rate controlled on metoprolol  · Anticoagulated on Coumadin  · Holding metoprolol and initiated Cardizem gtt  · Goal INR 2-3  · Cardiopulmonary monitoring  · Daily INR    Lab Results   Component Value Date    INR 2 81 (H) 09/13/2021    INR 2 90 (H) 09/13/2021    INR 2 1 08/23/2021

## 2021-09-13 NOTE — ASSESSMENT & PLAN NOTE
Lab Results   Component Value Date    HGBA1C 5 5 05/21/2021       Recent Labs     09/13/21  1550   POCGLU 122       Blood Sugar Average: Last 72 hrs:  (P) 122

## 2021-09-13 NOTE — ASSESSMENT & PLAN NOTE
Met septic shock criteria upon presentation based on the following: Tachycardic, hypotension, leukocytosis, panniculitis  CT abdomen and pelvis:Disproportionate dermal thickening and subcutaneous edema in the ventral lower pannus without encapsulated collections, soft tissue gas, or inflammatory changes extending into the perineum  I patient admitted to the Critical Care  Input from the critical Care/Pulmonary team highly appreciated  Blood Cultures x2 ordered, pending  Continue IV cefepime and vancomycin as documented  Levophed given as documented  Continue telemetry  Continue IV fluids  Daily I's/O's, and weight  A m  Labs including CBC, CMP, procal   Vancomycin trough

## 2021-09-13 NOTE — ED PROVIDER NOTES
History  Chief Complaint   Patient presents with    Abdominal Pain     stomach swollen raw and painful since last night     Patient is a 43-year-old male complaining of a in swollen red and painful lower abdominal region  Patient has history of morbid obesity  Patient also complains of feeling fever and does endorse chills  Complain of generalized weakness but able to ambulate around his house but states been slightly more difficult than normal   No other recent illness or injury  Abdominal Pain  Associated symptoms: no chest pain, no chills, no constipation, no cough, no diarrhea, no dysuria, no fever, no hematuria, no nausea, no shortness of breath, no sore throat and no vomiting        Prior to Admission Medications   Prescriptions Last Dose Informant Patient Reported? Taking? Lancets (freestyle) lancets  Self No No   Sig: Test glucose once a day  docusate sodium (COLACE) 100 mg capsule  Self No No   Sig: Take 1 capsule (100 mg total) by mouth 2 (two) times a day as needed for constipation   ferrous sulfate 324 (65 Fe) mg  Self No No   Sig: Take 1 tablet (324 mg total) by mouth 2 (two) times a day before meals   Patient not taking: Reported on 8/4/2021   furosemide (LASIX) 80 mg tablet   No No   Sig: Take 1 tablet (80 mg total) by mouth 2 (two) times a day   glucose blood test strip  Self No No   Sig: Test glucose once a day     metoprolol succinate (TOPROL-XL) 25 mg 24 hr tablet   No No   Sig: Take 1 tablet (25 mg total) by mouth 2 (two) times a day   midodrine (PROAMATINE) 10 MG tablet   No No   Sig: Take 1 tablet (10 mg total) by mouth 2 (two) times a day   polyethylene glycol (MIRALAX) 17 g packet  Self No No   Sig: Take 17 g by mouth daily as needed (constipation)   potassium chloride (K-DUR,KLOR-CON) 20 mEq tablet   No No   Sig: Take 1 tablet (20 mEq total) by mouth 2 (two) times a day   warfarin (COUMADIN) 5 mg tablet  Self No No   Sig: Please start taking 2 5 mg QD      Facility-Administered Medications: None       Past Medical History:   Diagnosis Date    A-fib [facility])     Acute renal failure superimposed on stage 3b chronic kidney disease ([place] [address] ) 03/15/2021    Cardiac disease     Chronic combined systolic (congestive) and diastolic (congestive) heart failure (HCC)     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 ascites (HCC)     Class 3 severe obesity due to excess calories with serious comorbidity and body mass index (BMI) of 45 0 to 49 9 in adult [facility]) 10/17/2018    Diabetes mellitus ([place] [address] )     Dilated cardiomyopathy ([place] [address] )     History of echocardiogram 11/24/2014    EF 0 30 (30%), Likely mod LV systolic dysfunction  Likely RV dysfunction as well   History of Lexiscan MPI 02/19/2016    EF 0 43 (43%), no prior MI or ischemia   Hx of echocardiogram 04/28/2017    Normal EF, Normal LV systolic function  Mild concentric LV hypertrophy  Mild mitral and tricuspid regurg   Hx: recurrent pneumonia     Hypertension     Hypokalemia 09/04/2020    Long term (current) use of anticoagulants     Morbid (severe) obesity due to excess calories (HCC)     Neuropathy     Pleural effusion on right     Stage 3 chronic kidney disease [facility])        Past Surgical History:   Procedure Laterality Date    HERNIA REPAIR      IR CHEST TUBE CHECK/CHANGE/REPOSITION/UPSIZE  5/27/2021    IR CHEST TUBE PLACEMENT  5/22/2021    IR THORACENTESIS  8/25/2020    IR THORACENTESIS  2/23/2021    LUNG DECORTICATION Right 6/2/2021    Procedure: DECORTICATION LUNG;  Surgeon: Marcos Simon MD;  [facility];  Service: Thoracic    SPLENECTOMY, TOTAL      THORACOSCOPY VIDEO ASSISTED SURGERY (VATS) Right 6/2/2021    Procedure: THORACOSCOPY VIDEO ASSISTED SURGERY (VATS); Surgeon: Marcos Simon MD;  [facility];  Service: Thoracic    VASCULAR SURGERY Right     leg       History reviewed  No pertinent family history  I have reviewed and agree with the history as documented      E-Cigarette/Vaping    E-Cigarette Use Never User      E-Cigarette/Vaping Substances    Nicotine No     THC No     CBD No     Flavoring No     Other No     Unknown No      Social History     Tobacco Use    Smoking status: Current Some Day Smoker     Packs/day: 0 25     Types: Cigarettes    Smokeless tobacco: Never Used    Tobacco comment: smokes 1 pack per week and a half    Vaping Use    Vaping Use: Never used   Substance Use Topics    Alcohol use: Not Currently     Alcohol/week: 14 0 standard drinks     Types: 5 Glasses of wine, 5 Cans of beer, 4 Shots of liquor per week     Comment: WEEKLY    Drug use: Never       Review of Systems   Constitutional: Negative for activity change, appetite change, chills and fever  HENT: Negative for ear pain, hearing loss, rhinorrhea, sneezing, sore throat and trouble swallowing  Eyes: Negative for pain and visual disturbance  Respiratory: Negative for cough, choking, chest tightness, shortness of breath, wheezing and stridor  Cardiovascular: Positive for leg swelling  Negative for chest pain and palpitations  Gastrointestinal: Positive for abdominal distention and abdominal pain  Negative for constipation, diarrhea, nausea and vomiting  Genitourinary: Negative for difficulty urinating, dysuria, frequency, hematuria and testicular pain  Musculoskeletal: Negative for arthralgias, back pain, gait problem and neck pain  Skin: Positive for rash  Negative for color change  Allergic/Immunologic: Negative for immunocompromised state  Neurological: Negative for dizziness, seizures, syncope, speech difficulty, weakness, light-headedness, numbness and headaches  Psychiatric/Behavioral: Negative for confusion  The patient is not nervous/anxious  All other systems reviewed and are negative  Physical Exam  Physical Exam  Vitals and nursing note reviewed  Constitutional:       General: He is not in acute distress  Appearance: He is well-developed  He is obese   He is not diaphoretic  HENT:      Head: Normocephalic and atraumatic  Mouth/Throat:      Mouth: Mucous membranes are moist    Eyes:      General: No scleral icterus  Extraocular Movements: Extraocular movements intact  Conjunctiva/sclera: Conjunctivae normal    Cardiovascular:      Rate and Rhythm: Normal rate and regular rhythm  Heart sounds: Normal heart sounds  No murmur heard  Pulmonary:      Effort: Pulmonary effort is normal  No respiratory distress  Breath sounds: Rhonchi present  No wheezing or rales  Chest:      Chest wall: No tenderness  Abdominal:      General: Bowel sounds are normal  There is no distension  Palpations: Abdomen is soft  There is no mass  Tenderness: There is no abdominal tenderness  There is no guarding or rebound  Musculoskeletal:         General: No tenderness  Normal range of motion  Cervical back: Normal range of motion and neck supple  Lymphadenopathy:      Cervical: No cervical adenopathy  Skin:     General: Skin is warm and dry  Capillary Refill: Capillary refill takes less than 2 seconds  Findings: Erythema present  No rash  Comments: Chronic venous stasis changes bilateral lower extremity with bilateral pitting edema and brawny edema   Neurological:      General: No focal deficit present  Mental Status: He is alert and oriented to person, place, and time  Motor: No abnormal muscle tone     Psychiatric:         Mood and Affect: Mood normal          Behavior: Behavior normal          Vital Signs  ED Triage Vitals [09/13/21 0954]   Temperature Pulse Respirations Blood Pressure SpO2   98 4 °F (36 9 °C) (!) 152 20 108/63 96 %      Temp Source Heart Rate Source Patient Position - Orthostatic VS BP Location FiO2 (%)   Temporal Monitor Sitting Left arm --      Pain Score       7           Vitals:    09/13/21 1300 09/13/21 1315 09/13/21 1415 09/13/21 1445   BP: (!) 91/44 91/50 106/50 102/57   Pulse: (!) 141 (!) 143 (!) 139 (!) 141   Patient Position - Orthostatic VS: Sitting Sitting Sitting Sitting         Visual Acuity      ED Medications  Medications   DOPamine (INTROPIN) 400 mg in 250 mL infusion (premix) (has no administration in time range)   diltiazem (CARDIZEM) injection 15 mg (15 mg Intravenous Given 9/13/21 1018)   cefepime (MAXIPIME) IVPB (premix in dextrose) 1,000 mg 50 mL (0 mg Intravenous Stopped 9/13/21 1147)   lactated ringers bolus 1,000 mL (0 mL Intravenous Stopped 9/13/21 1147)     Followed by   lactated ringers bolus 1,000 mL (0 mL Intravenous Stopped 9/13/21 1208)     Followed by   lactated ringers bolus 1,000 mL (0 mL Intravenous Stopped 9/13/21 1230)   vancomycin (VANCOCIN) 2,000 mg in sodium chloride 0 9 % 500 mL IVPB (0 mg Intravenous Stopped 9/13/21 1456)   lactated ringers bolus 1,000 mL (0 mL Intravenous Stopped 9/13/21 1357)       Diagnostic Studies  Results Reviewed     Procedure Component Value Units Date/Time    UA w Reflex to Microscopic w Reflex to Culture [[id number]] Collected: 09/13/21 1259    Lab Status: Final result Specimen: Urine, Clean Catch Updated: 09/13/21 1311     Color, UA Yellow     Clarity, UA Clear     Specific Gravity, UA <=1 005     pH, UA 6 0     Leukocytes, UA Negative     Nitrite, UA Negative     Protein, UA Negative mg/dl      Glucose, UA Negative mg/dl      Ketones, UA Negative mg/dl      Urobilinogen, UA 1 0 E U /dl      Bilirubin, UA Negative     Blood, UA Negative    Protime-INR [[id number]]  (Abnormal) Collected: 09/13/21 1100    Lab Status: Final result Specimen: Blood from Arm, Right Updated: 09/13/21 1140     Protime 28 6 seconds      INR 2 90    APTT [[id number]]  (Abnormal) Collected: 09/13/21 1100    Lab Status: Final result Specimen: Blood from Arm, Right Updated: 09/13/21 1140     PTT 82 seconds     Lactic acid [[id number]]  (Normal) Collected: 09/13/21 1100    Lab Status: Final result Specimen: Blood from Arm, Left Updated: 09/13/21 1133     LACTIC ACID 1 7 mmol/L     Narrative:      Result may be elevated if tourniquet was used during collection  Blood culture #1 [[id number]] Collected: 09/13/21 1100    Lab Status: In process Specimen: Blood from Arm, Left Updated: 09/13/21 1111    Blood culture #2 [[id number]] Collected: 09/13/21 1100    Lab Status: In process Specimen: Blood from Arm, Right Updated: 09/13/21 1111    Procalcitonin with AM Reflex [[id number]] Collected: 09/13/21 1100    Lab Status: In process Specimen: Blood from Arm, Left Updated: 09/13/21 1111    Troponin I [[id number]]  (Normal) Collected: 09/13/21 1009    Lab Status: Final result Specimen: Blood from Arm, Right Updated: 09/13/21 1048     Troponin I <0 02 ng/mL     Manual Differential(PHLEBS Do Not Order) [[id number]]  (Abnormal) Collected: 09/13/21 1009    Lab Status: Final result Specimen: Blood from Arm, Right Updated: 09/13/21 1047     Segmented % 82 %      Bands % 13 %      Lymphocytes % 4 %      Monocytes % 1 %      Eosinophils, % 0 %      Basophils % 0 %      Absolute Neutrophils 17 48 Thousand/uL      Lymphocytes Absolute 0 74 Thousand/uL      Monocytes Absolute 0 18 Thousand/uL      Eosinophils Absolute 0 00 Thousand/uL      Basophils Absolute 0 00 Thousand/uL      Total Counted --     Anisocytosis Present     Basophilic Stippling Present     Hypochromia Present     Polychromasia Present     Stomatocytes Present     Platelet Estimate Adequate    CBC and differential [[id number]]  (Abnormal) Collected: 09/13/21 1009    Lab Status: Final result Specimen: Blood from Arm, Right Updated: 09/13/21 1047     WBC 18 40 Thousand/uL      RBC 2 57 Million/uL      Hemoglobin 7 4 g/dL      Hematocrit 23 9 %      MCV 93 fL      MCH 28 8 pg      MCHC 31 0 g/dL      RDW 22 5 %      MPV 10 0 fL      Platelets 707 Thousands/uL     Narrative: This is an appended report  These results have been appended to a previously verified report      Comprehensive metabolic panel [[id number]]  (Abnormal) Collected: 09/13/21 1009    Lab Status: Final result Specimen: Blood from Arm, Right Updated: 09/13/21 1046     Sodium 127 mmol/L      Potassium 4 1 mmol/L      Chloride 89 mmol/L      CO2 32 mmol/L      ANION GAP 6 mmol/L      BUN 48 mg/dL      Creatinine 2 06 mg/dL      Glucose 144 mg/dL      Calcium 8 0 mg/dL      Corrected Calcium 9 8 mg/dL      AST 22 U/L      ALT 14 U/L      Alkaline Phosphatase 224 U/L      Total Protein 6 8 g/dL      Albumin 1 8 g/dL      Total Bilirubin 1 64 mg/dL      eGFR 34 ml/min/1 73sq m     Narrative:      [place] guidelines for Chronic Kidney Disease (CKD):     Stage 1 with normal or high GFR (GFR > 90 mL/min/1 73 square meters)    Stage 2 Mild CKD (GFR = 60-89 mL/min/1 73 square meters)    Stage 3A Moderate CKD (GFR = 45-59 mL/min/1 73 square meters)    Stage 3B Moderate CKD (GFR = 30-44 mL/min/1 73 square meters)    Stage 4 Severe CKD (GFR = 15-29 mL/min/1 73 square meters)    Stage 5 End Stage CKD (GFR <15 mL/min/1 73 square meters)  Note: GFR calculation is accurate only with a steady state creatinine    Lipase [[id number]]  (Normal) Collected: 09/13/21 1009    Lab Status: Final result Specimen: Blood from Arm, Right Updated: 09/13/21 1041     Lipase 163 u/L                  CT chest abdomen pelvis wo contrast   Final Result by Sofía Martinez MD (09/13 1449)      1  Disproportionate dermal thickening and subcutaneous edema in the ventral lower pannus without encapsulated collections, soft tissue gas, or inflammatory changes extending into the perineum  Finding is superimposed on mild diffuse anasarca  2   Large, chronic, partially loculated right pleural effusion with associated rounded atelectasis at right lung base  3   Mild pulmonary interstitial edema without alveolar consolidations                    Workstation performed: [id number]                    Procedures  CriticalCare Time  Performed by: Sagar Alarcon DO  Authorized by: Donta Bhatti DO     Critical care provider statement:     Critical care time (minutes):  45    Critical care time was exclusive of:  Separately billable procedures and treating other patients and teaching time    Critical care was necessary to treat or prevent imminent or life-threatening deterioration of the following conditions:  Sepsis and shock    Critical care was time spent personally by me on the following activities:  Blood draw for specimens, obtaining history from patient or surrogate, development of treatment plan with patient or surrogate, discussions with consultants, discussions with primary provider, evaluation of patient's response to treatment, examination of patient, review of old charts, re-evaluation of patient's condition, ordering and review of radiographic studies, ordering and performing treatments and interventions and ordering and review of laboratory studies    I assumed direction of critical care for this patient from another provider in my specialty: no    ECG 12 Lead Documentation Only    Date/Time: 9/13/2021 3:09 PM  Performed by: Donta Bhatti DO  Authorized by: Donta Bhatti DO     Indications / Diagnosis:  Tachycardia/sepsis  ECG reviewed by me, the ED Provider: yes    Patient location:  ED  Rate:     ECG rate:  157    ECG rate assessment: tachycardic    Rhythm:     Rhythm: atrial fibrillation    Ectopy:     Ectopy: none    QRS:     QRS axis:  Right    QRS intervals:  Normal  Conduction:     Conduction: abnormal      Abnormal conduction: incomplete RBBB    ST segments:     ST segments:  Normal  T waves:     T waves: non-specific               ED Course      ideal body weight use for IV fluid resuscitation dosing  Pt obese with BMI > 30                      Initial Sepsis Screening     Row Name 09/13/21 1221 09/13/21 1107             Is the patient's history suggestive of a new or worsening infection?  --  (!) Yes (Proceed)  -RAGINI       Suspected source of infection soft tissue  -RAGINI  soft tissue  -RAGINI       Are two or more of the following signs & symptoms of infection both present and new to the patient? (!) Yes (Proceed)  -RAGINI  (!) Yes (Proceed)  -RAGINI       Indicate SIRS criteria  Hyperthemia > 38 3C (100 9F); Leukocytosis (WBC > 32208 IJL); Tachycardia > 90 bpm  -RAGINI  Hyperthemia > 38 3C (100 9F); Leukocytosis (WBC > 69772 IJL); Tachycardia > 90 bpm  -RAGINI       If the answer is yes to both questions, suspicion of sepsis is present  --  --       If severe sepsis is present AND tissue hypoperfusion perists in the hour after fluid resuscitation or lactate > 4, the patient meets criteria for SEPTIC SHOCK  --  --       Are any of the following organ dysfunction criteria present within 6 hours of suspected infection and SIRS criteria that are NOT considered to be chronic conditions?   (!) Yes  -RAGINI  (!) Yes  -RAGINI       Organ dysfunction  Creatinine > 2 0 mg/dL;SBP decrease > 40 mmHg from baseline;SBP < 90 mmHg  -RAGINI  --       Date of presentation of severe sepsis  09/13/21  -Sacha Pereyra  --       Time of presentation of severe sepsis  1110  -RAGINI  --       Tissue hypoperfusion persists in the hour after crystalloid fluid administration, evidenced, by either:  --  --       Was hypotension present within one hour of the conclusion of crystalloid fluid administration?  --  --       Date of presentation of septic shock  --  --       Time of presentation of septic shock  1130 1130 am  -RAGINI  --         User Key  (r) = Recorded By, (t) = Taken By, (c) = Cosigned By    [address] Name Provider Type    Sosa Ulrich DO Physician           Default Flowsheet Data (last 720 hours)      Sepsis Reassess     [address] Name 09/13/21 1507 09/13/21 1249                Repeat Volume Status and Tissue Perfusion Assessment Performed    Repeat Volume Status and Tissue Perfusion Assessment Performed  --  Yes  -RAGINI          Volume Status and Tissue Perfusion Post Fluid Resuscitation * Must Document All *    Vital Signs Reviewed (HR, RR, BP, T)  Yes  -RAGINI  Yes  -RAGINI       Shock Index Reviewed  Yes  -RAGINI  Yes  -RAGINI       Arterial Oxygen Saturation Reviewed (POx, SaO2 or SpO2)  Yes (comment %) 99% RA  -RAGINI  Yes (comment %) 99% RA  -RAGINI       Cardio  (!) Tachycardia;Irregular rhythm  -RAGINI  (!) Irregular rhythm; Tachycardia  -RAGINI       Pulmonary  (!) Normal effort;Rhonchi  -RAGINI  Normal effort;Clear to auscultation  -RAGINI       Capillary Refill  Brisk  -RAGINI  Brisk  -RAGINI       Peripheral Pulses  Radial;Dorsalis Pedis; Posterior Tibialis  -RAGINI  Radial;Posterior Tibialis;Dorsalis Pedis  -RAGINI       Peripheral Pulse  +2  -RAGINI  +2  -RAGINI       Dorsalis Pedis  +2  -RAGINI  +2  -RAGINI       Posterior Tibialis  +2  -RAGINI  +2  -RAGINI       Skin  Warm  -RAGINI  Warm  -RAGINI       Urine output assessed  Adequate  -RAGINI  Adequate  -RAGINI          *OR*   Intensive Monitoring- Must Document One of the Following Four *:    Vital Signs Reviewed  --  --       * Central Venous Pressure (CVP or RAP)  --  --       * Central Venous Oxygen (SVO2, ScvO2 or Oxygen saturation via central catheter)  --  --       * Bedside Cardiovascular US in IVC diameter and % collapse  --  --       * Passive Leg Raise OR Crystalloid Challenge  --  --         User Key  (r) = Recorded By, (t) = Taken By, (c) = Cosigned By    Initials Name Provider Type    Karrie Terrazas DO Physician        SBIRT 22yo+      Most Recent Value   SBIRT (22 yo +)   In order to provide better care to our patients, we are screening all of our patients for alcohol and drug use  Would it be okay to ask you these screening questions? Yes Filed at: 09/13/2021 1014   Initial Alcohol Screen: US AUDIT-C    1  How often do you have a drink containing alcohol? 3 Filed at: 09/13/2021 1014   2  How many drinks containing alcohol do you have on a typical day you are drinking? 0 Filed at: 09/13/2021 1014   3a  Male UNDER 65: How often do you have five or more drinks on one occasion? 0 Filed at: 09/13/2021 1014   3b  FEMALE Any Age, or MALE 65+:  How often do you have 4 or more drinks on one occassion? 0 Filed at: 09/13/2021 1014   Audit-C Score  3 Filed at: 09/13/2021 1014   ELIDA: How many times in the past year have you    Used an illegal drug or used a prescription medication for non-medical reasons? Never Filed at: 09/13/2021 1014                    MDM    Disposition  Final diagnoses:   Septic shock ([address] )   Panniculitis   Renal insufficiency     Time reflects when diagnosis was documented in both MDM as applicable and the Disposition within this note     Time User Action Codes Description Comment    9/13/2021  3:06 PM Arabella Ar T Add [A41 9,  R65 21] Septic shock ([address] )     9/13/2021  3:06 PM Arabella Ar T Add [M79 3] Panniculitis     9/13/2021  3:06 PM Arabella Ar T Add [N28 9] Renal insufficiency       ED Disposition     ED Disposition Condition Date/Time Comment    Admit Stable Mon Sep 13, 2021  3:05 PM Case was discussed with KEVIN and the patient's admission status was agreed to be Admission Status: inpatient status to the service of Dr Nathan Schmitt  Follow-up Information    None         Patient's Medications   Discharge Prescriptions    No medications on file     No discharge procedures on file      PDMP Review       Value Time User    PDMP Reviewed  Yes 11/5/2020  5:40 PM Ale Ridley MD          ED Provider  Electronically Signed by           Travis Moran DO  09/13/21 5365

## 2021-09-13 NOTE — ASSESSMENT & PLAN NOTE
This is a chronic condition  Patient is stable  Avoid hepatotoxic medications  Check a m  CBC, CMP, coags

## 2021-09-13 NOTE — ASSESSMENT & PLAN NOTE
This is a chronic condition  Patient is stable  If hemoglobin drops below 7 transfuse 1 packed RBC  A m  CBC, CMP

## 2021-09-13 NOTE — ASSESSMENT & PLAN NOTE
· Known past medical history in the setting of CKD and cirrhosis  · Baseline hemoglobin appears to be closer to 7-8  · Known past medical history of colonic AVMs requiring APC  · Recommend transfusion for less than 7  · FOBT ordered  · Iron panel ordered  · 2 units PRBC ordered to prepare on 09/14  · 1 unit PRBC ordered to transfuse on 09/14  · Hold Coumadin given therapeutic INR and downtrending hemoglobin  · Serial hemoglobin monitoring  · Pending iron panel would consider gastroenterology evaluation as on last visit if continued to down trend may require repeat colonoscopy +/- push enteroscopy    Lab Results   Component Value Date    HGB 6 4 (LL) 09/14/2021    HGB 6 6 (LL) 09/14/2021    HGB 7 4 (L) 09/13/2021

## 2021-09-13 NOTE — H&P
[address]alla 1960, 57 y o  male MRN: [id number]  Unit/Bed#:  Encounter: [id number]  Primary Care Provider: Jose Angel Garcia DO   Date and time admitted to hospital: 9/13/2021  9:51 AM    Acute kidney injury superimposed on chronic kidney disease [facility])  Assessment & Plan  Lab Results   Component Value Date    EGFR 37 09/13/2021    EGFR 34 09/13/2021    EGFR 44 06/29/2021    CREATININE 1 89 (H) 09/13/2021    CREATININE 2 06 (H) 09/13/2021    CREATININE 1 66 (H) 06/29/2021   Presented with BUN 48, CR:  2  GFR:  34   His baseline:  BUN 25, CR:  1 3  GFR:  34      Urinalysis 09/13/2021:  negative       4 L of IV fluids were given in the ED  1 L of IV normal saline started after admission to be given as of 125 mph  Reassessed the patient in the morning  CBC, CMP a m  * Septic shock ([facility] [address] )  Assessment & Plan  Met septic shock criteria upon presentation based on the following: Tachycardic, hypotension, leukocytosis, panniculitis  CT abdomen and pelvis:Disproportionate dermal thickening and subcutaneous edema in the ventral lower pannus without encapsulated collections, soft tissue gas, or inflammatory changes extending into the perineum  I patient admitted to the Critical Care  Input from the critical Care/Pulmonary team highly appreciated  Blood Cultures x2 ordered, pending  Continue IV cefepime and vancomycin as documented  Levophed given as documented  Continue telemetry  Continue IV fluids  Daily I's/O's, and weight  A m  Labs including CBC, CMP, procal   Vancomycin trough  Panniculitis  Assessment & Plan  Involving the lower abdominal area  On physical exam:  Tenderness and erythema involving the suprapubic, RLQ, LLQ is noted  Refer to the assessment and plan for septic shock      Atrial fibrillation [facility])  Assessment & Plan  Patient is known to have permanent atrial fibrillation with rate control on Coumadin and metoprolol  Presented with atrial fibrillation with tachycardia  Continue Coumadin as documented  Adjust based on INR  Stop metoprolol and start Cardizem drip  Titrate based on blood pressure  To reassess accordingly  A m  CBC, coags   Monitor Is/Os and weight  Continue telemetry  Pleural effusion, right  Assessment & Plan  This is a chronic condition  Due to Right lung hematomas  Following up with thoracic surgery  History of for sessions of thoracentesis within the past year  CT chest 09/13/2021: Large chronic right pleural effusion with probable partial loculation at the anterolateral base  Unchanged round atelectasis of the adjacent basilar right middle and lower lobes  No acute consolidations    His upcoming thoracentesis session was scheduled for tomorrow  Interventional Radiology consulted, input highly appreciated  Chronic right-sided congestive heart failure [facility])  Assessment & Plan  This is a chronic condition  Patient follows up with [facility] (Dr wiley)  Patient euvolemic on exam  Monitor I/os and daily weight  Telemetry and vital signs  Am labs  Hyponatremia  Assessment & Plan  Serum sodium 127 upon presentation  4 L of IV Ringer's lactate was given  1 L IV normal saline is started and to be infused on 01/20 5 mph  Recheck CMP in the morning  Chronic anemia  Assessment & Plan  This is a chronic condition  Patient is stable  If hemoglobin drops below 7 transfuse 1 packed RBC  A m  CBC, CMP  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 ascites [facility])  Assessment & Plan  This is a chronic condition  Patient is stable  Avoid hepatotoxic medications  Check a m  CBC, CMP, coags  STEFF (obstructive sleep apnea)  Assessment & Plan  This is a chronic condition  Patient uses CPAP at home  CPAP ordered  To be administered during hospital stay  Chronic venous insufficiency  Assessment & Plan  This is a chronic condition  Stable  Continue current medical regimen as documented  Type 2 diabetes mellitus with diabetic chronic kidney disease [facility])  Assessment & Plan  Lab Results   Component Value Date    HGBA1C 5 5 05/21/2021       Recent Labs     09/13/21  1550 09/13/21  1807   POCGLU 122 177*       Blood Sugar Average: Last 72 hrs:  (P) 149  5    Chronic diastolic (congestive) heart failure [facility])  Assessment & Plan  Wt Readings from Last 3 Encounters:   09/13/21 (!) 152 kg (335 lb 12 2 oz)   08/04/21 (!) 138 kg (305 lb)   07/07/21 (!) 139 kg (306 lb)     This is a chronic condition  Patient follows up with Cardiology associates (Dr wiley)  Patient euvolemic on exam  Monitor I/os and daily weight  Telemetry and vital signs  Am labs  VTE Prophylaxis: Warfarin (Coumadin)  / sequential compression device   Code Status: full  POLST: POLST form is on file already (pre-hospital)  Discussion with family: wife at bedside  Anticipated Length of Stay:  Patient will be admitted on an Inpatient basis with an anticipated length of stay of  2 or more midnights  Justification for Hospital Stay: sepsis management  Total Time for Visit, including Counseling / Coordination of Care: 60 minutes  Greater than 50% of this total time spent on direct patient counseling and coordination of care  Chief Complaint:   Lower abdominal pain for one day  History of Present Illness:    Kalyani Wilkes is a 57 y o  male with a past medical history of combined systolic and diastolic heart failure, permanent atrial fibrillation, chronic kidney disease stage 3, liver cirrhosis  Presented to the ED with abdominal pain for 1 day  The Patient has a continuous lower abdominal pain for 1 day  It is Sharp and cramping in character  Ranges from 5-9/10 on severity scale and is aggravated upon minimal exertion  Does not radiate  Associated with on and off fever and mild nausea since onset  Fever was not documented at home       The patient notes that his dog scratches his lower extremities routinely but did not have any recent deep scratches or open wounds  No associated vomiting, diarrhea, constipation, sweating, headaches, urinary symptoms, recent trips, tick bites, animal bites, injury or trauma to the abdominal area, or previous history of skin or subcutaneous tissue infection  Review of Systems:    Review of Systems   Constitutional: Negative  Negative for chills, fatigue, fever and unexpected weight change  HENT: Negative  Negative for congestion, ear discharge, ear pain, hearing loss, mouth sores, rhinorrhea, sinus pressure, sore throat, trouble swallowing and voice change  Eyes: Negative  Negative for visual disturbance  Respiratory: Positive for apnea (STEFF)  Negative for cough, choking, chest tightness, shortness of breath and wheezing  Cardiovascular: Positive for leg swelling  Negative for chest pain and palpitations  Gastrointestinal: Positive for abdominal pain and constipation  Negative for blood in stool, diarrhea, nausea and vomiting  Endocrine: Negative  Genitourinary: Negative for difficulty urinating and dysuria  Musculoskeletal: Negative for arthralgias and myalgias  Skin: Negative  Allergic/Immunologic: Negative  Neurological: Negative for dizziness, tremors, syncope, weakness, light-headedness, numbness and headaches  Hematological: Negative for adenopathy  Psychiatric/Behavioral: Negative  Negative for agitation         Past Medical and Surgical History:     Past Medical History:   Diagnosis Date    A-fib [facility])     Acute renal failure superimposed on stage 3b chronic kidney disease ([facility] [address] ) 03/15/2021    Cardiac disease     Chronic combined systolic (congestive) and diastolic (congestive) heart failure (HCC)     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 ascites (HCC)     Class 3 severe obesity due to excess calories with serious comorbidity and body mass index (BMI) of 45 0 to 49 9 in adult ([facility] ) 10/17/2018    Diabetes mellitus ([facility] )     Dilated cardiomyopathy ([facility] )     History of echocardiogram 11/24/2014    EF 0 30 (30%), Likely mod LV systolic dysfunction  Likely RV dysfunction as well   History of Lexiscan MPI 02/19/2016    EF 0 43 (43%), no prior MI or ischemia   Hx of echocardiogram 04/28/2017    Normal EF, Normal LV systolic function  Mild concentric LV hypertrophy  Mild mitral and tricuspid regurg   Hx: recurrent pneumonia     Hypertension     Hypokalemia 09/04/2020    Long term (current) use of anticoagulants     Morbid (severe) obesity due to excess calories (HCC)     Neuropathy     Pleural effusion on right     Stage 3 chronic kidney disease [facility])        Past Surgical History:   Procedure Laterality Date    HERNIA REPAIR      IR CHEST TUBE CHECK/CHANGE/REPOSITION/UPSIZE  5/27/2021    IR CHEST TUBE PLACEMENT  5/22/2021    IR THORACENTESIS  8/25/2020    IR THORACENTESIS  2/23/2021    LUNG DECORTICATION Right 6/2/2021    Procedure: DECORTICATION LUNG;  Surgeon: Kary Love MD;  [facility];  Service: Thoracic    SPLENECTOMY, TOTAL      THORACOSCOPY VIDEO ASSISTED SURGERY (VATS) Right 6/2/2021    Procedure: THORACOSCOPY VIDEO ASSISTED SURGERY (VATS); Surgeon: Kary Love MD;  [facility]: [facility];  Service: Thoracic    VASCULAR SURGERY Right     leg       Meds/Allergies:    Prior to Admission medications    Medication Sig Start Date End Date Taking?  Authorizing Provider   furosemide (LASIX) 80 mg tablet Take 1 tablet (80 mg total) by mouth 2 (two) times a day 8/16/21  Yes Ahmet Chavez, DO   metoprolol succinate (TOPROL-XL) 25 mg 24 hr tablet Take 1 tablet (25 mg total) by mouth 2 (two) times a day 9/13/21  Yes Ahmet Chavez, DO   polyethylene glycol (MIRALAX) 17 g packet Take 17 g by mouth daily as needed (constipation) 3/11/21  Yes Rafael Lawrence, DO   potassium chloride (K-DUR,KLOR-CON) 20 mEq tablet Take 1 tablet (20 mEq total) by mouth 2 (two) times a day 8/25/21  Kiera Chavez,    warfarin (COUMADIN) 5 mg tablet Take 5 mg by mouth every other day   Yes Historical Provider, MD   docusate sodium (COLACE) 100 mg capsule Take 1 capsule (100 mg total) by mouth 2 (two) times a day as needed for constipation  Patient not taking: Reported on 9/13/2021 3/11/21   Shelvy Dandy Hostetter, DO   ferrous sulfate 324 (65 Fe) mg Take 1 tablet (324 mg total) by mouth 2 (two) times a day before meals  Patient not taking: Reported on 8/4/2021 4/8/21   Florinda Medicus Chapa,    glucose blood test strip Test glucose once a day  4/14/21   Stefan Chapa,    Lancets (freestyle) lancets Test glucose once a day  4/14/21   Stefan Chapa DO   midodrine (PROAMATINE) 10 MG tablet Take 1 tablet (10 mg total) by mouth 2 (two) times a day  Patient not taking: Reported on 9/13/2021 8/16/21   Loida Boles DO   warfarin (COUMADIN) 5 mg tablet Please start taking 2 5 mg QD  Patient taking differently: Take 2 5 mg by mouth every other day Please start taking 2 5 mg QD 6/9/21   Margo Rivera DO     I have reviewed home medications with patient personally  Allergies: No Known Allergies    Social History:     Marital Status: /Civil Union   Occupation:  Office work  Patient Pre-hospital Living Situation:  Stable  Patient Pre-hospital Level of Mobility:  Active  Patient Pre-hospital Diet Restrictions:  None  Substance Use History:   Social History     Substance and Sexual Activity   Alcohol Use Yes    Comment: 4 or 5 drinks weekly/bloody chani's     Social History     Tobacco Use   Smoking Status Current Some Day Smoker    Packs/day: 0 25    Types: Cigarettes   Smokeless Tobacco Never Used   Tobacco Comment    smokes 1 pack per week and a half      Social History     Substance and Sexual Activity   Drug Use Never       Family History:    History reviewed  No pertinent family history      Physical Exam:     Vitals:   Blood Pressure: (!) 87/51 (09/13/21 )  Pulse: (!) 133 (21)  Temperature: 98 8 °F (37 1 °C) (21 160)  Temp Source: Tympanic (21)  Respirations: 12 (21)  Height: 6' (182 9 cm) (21 160)  Weight - Scale: (!) 152 kg (335 lb 12 2 oz) (21 1715)  SpO2: 94 % (21)    Physical Exam  Constitutional:       Appearance: Normal appearance  He is obese  He is ill-appearing  HENT:      Head: Normocephalic  Mouth/Throat:      Mouth: Mucous membranes are moist    Eyes:      Conjunctiva/sclera: Conjunctivae normal    Cardiovascular:      Rate and Rhythm: Rhythm regularly irregular  Chest Wall: PMI is not displaced  No thrill  Pulses:           Radial pulses are 2+ on the right side and 2+ on the left side  Dorsalis pedis pulses are 2+ on the right side and 2+ on the left side  Posterior tibial pulses are 2+ on the right side and 2+ on the left side  Heart sounds: Heart sounds not distant  No murmur heard  Pulmonary:      Effort: Tachypnea present  No accessory muscle usage, prolonged expiration, respiratory distress or retractions  Breath sounds: Examination of the right-middle field reveals wheezing  Examination of the left-middle field reveals wheezing  Examination of the right-lower field reveals wheezing  Examination of the left-lower field reveals wheezing  Wheezing (Expiratory) and rales present  Abdominal:      General: Bowel sounds are absent  There is no abdominal bruit  Palpations: Abdomen is soft  There is no shifting dullness, fluid wave or mass  Tenderness: There is generalized abdominal tenderness and tenderness in the right lower quadrant, suprapubic area and left lower quadrant  Comments: Redness and tenderness involving the marked area  Musculoskeletal:      Right lower le+ Pitting Edema present  Left lower le+ Pitting Edema present        Comments: Stasis dermatitis noted and involving bilateral lower extremities from below the knee to the ankle  Neurological:      Mental Status: He is alert  Additional Data:     Lab Results: I have personally reviewed pertinent reports  Results from last 7 days   Lab Units 09/13/21  1009   WBC Thousand/uL 18 40*   HEMOGLOBIN g/dL 7 4*   HEMATOCRIT % 23 9*   PLATELETS Thousands/uL 294   BANDS PCT % 13*   LYMPHO PCT % 4*   MONO PCT % 1*   EOS PCT % 0     Results from last 7 days   Lab Units 09/13/21  1808 09/13/21  1009   SODIUM mmol/L 127* 127*   POTASSIUM mmol/L 4 0 4 1   CHLORIDE mmol/L 91* 89*   CO2 mmol/L 31 32   BUN mg/dL 48* 48*   CREATININE mg/dL 1 89* 2 06*   ANION GAP mmol/L 5 6   CALCIUM mg/dL 8 0* 8 0*   ALBUMIN g/dL  --  1 8*   TOTAL BILIRUBIN mg/dL  --  1 64*   ALK PHOS U/L  --  224*   ALT U/L  --  14   AST U/L  --  22   GLUCOSE RANDOM mg/dL 152* 144*     Results from last 7 days   Lab Units 09/13/21  1808   INR  2 81*     Results from last 7 days   Lab Units 09/13/21  1807 09/13/21  1550   POC GLUCOSE mg/dl 177* 122         Results from last 7 days   Lab Units 09/13/21  1100   LACTIC ACID mmol/L 1 7   PROCALCITONIN ng/ml 3 89*       Imaging: I have personally reviewed pertinent reports  CT chest abdomen pelvis wo contrast   Final Result by Chip Panda MD (09/13 1449)      1  Disproportionate dermal thickening and subcutaneous edema in the ventral lower pannus without encapsulated collections, soft tissue gas, or inflammatory changes extending into the perineum  Finding is superimposed on mild diffuse anasarca  2   Large, chronic, partially loculated right pleural effusion with associated rounded atelectasis at right lung base  3   Mild pulmonary interstitial edema without alveolar consolidations  Workstation performed: [id number]             EKG, Pathology, and Other Studies Reviewed on Admission:   · EKG:  Irregularly irregular rhythm resembling Atrial fibrillation    Allscripts / Epic Records Reviewed:  Yes ** Please Note: This note has been constructed using a voice recognition system   **

## 2021-09-13 NOTE — ASSESSMENT & PLAN NOTE
· Known past medical history with history of requiring thoracenteses within the past year  · Secondary to right lung hematomas  · Follows OP with thoracic surgery; last evaluation on 9/2/2021  · Status post VATS procedure  · CT Chest 09/13/2021: "Large chronic right pleural effusion with probable partial loculation at the anterolateral base  Unchanged round atelectasis of the adjacent basilar right middle and lower lobes    No acute consolidations"  · Was scheduled for OP thoracentesis on 9/14/2021 however presented to ED on 9/13/2021  · IR consulted; awaiting evaluation

## 2021-09-13 NOTE — ASSESSMENT & PLAN NOTE
Involving the lower abdominal area  On physical exam:  Tenderness and erythema involving the suprapubic, RLQ, LLQ is noted  Refer to the assessment and plan for septic shock

## 2021-09-13 NOTE — PLAN OF CARE
Problem: Potential for Falls  Goal: Patient will remain free of falls  Description: INTERVENTIONS:  - Educate patient/family on patient safety including physical limitations  - Instruct patient to call for assistance with activity   - Consult OT/PT to assist with strengthening/mobility   - Keep Call bell within reach  - Keep bed low and locked with side rails adjusted as appropriate  - Keep care items and personal belongings within reach  - Initiate and maintain comfort rounds  - Make Fall Risk Sign visible to staff  - Offer Toileting every 2 Hours, in advance of need  - Initiate/Maintain bedalarm  - Obtain necessary fall risk management equipment: sign  - Apply yellow socks and bracelet for high fall risk patients  - Consider moving patient to room near nurses station  Outcome: Progressing     Problem: MOBILITY - ADULT  Goal: Maintain or return to baseline ADL function  Description: INTERVENTIONS:  -  Assess patient's ability to carry out ADLs; assess patient's baseline for ADL function and identify physical deficits which impact ability to perform ADLs (bathing, care of mouth/teeth, toileting, grooming, dressing, etc )  - Assess/evaluate cause of self-care deficits   - Assess range of motion  - Assess patient's mobility; develop plan if impaired  - Assess patient's need for assistive devices and provide as appropriate  - Encourage maximum independence but intervene and supervise when necessary  - Involve family in performance of ADLs  - Assess for home care needs following discharge   - Consider OT consult to assist with ADL evaluation and planning for discharge  - Provide patient education as appropriate  Outcome: Progressing  Goal: Maintains/Returns to pre admission functional level  Description: INTERVENTIONS:  - Perform BMAT or MOVE assessment daily    - Set and communicate daily mobility goal to care team and patient/family/caregiver     - Collaborate with rehabilitation services on mobility goals if consulted  - Out of bed to chair 3 times a day   - Out of bed for meals 3 times a day  - Out of bed for toileting  - Record patient progress and toleration of activity level   Outcome: Progressing     Problem: CARDIOVASCULAR - ADULT  Goal: Maintains optimal cardiac output and hemodynamic stability  Description: INTERVENTIONS:  - Monitor I/O, vital signs and rhythm  - Monitor for S/S and trends of decreased cardiac output  - Administer and titrate ordered vasoactive medications to optimize hemodynamic stability  - Assess quality of pulses, skin color and temperature  - Assess for signs of decreased coronary artery perfusion  - Instruct patient to report change in severity of symptoms  Outcome: Progressing  Goal: Absence of cardiac dysrhythmias or at baseline rhythm  Description: INTERVENTIONS:  - Continuous cardiac monitoring, vital signs, obtain 12 lead EKG if ordered  - Administer antiarrhythmic and heart rate control medications as ordered  - Monitor electrolytes and administer replacement therapy as ordered  Outcome: Progressing     Problem: GASTROINTESTINAL - ADULT  Goal: Maintains or returns to baseline bowel function  Description: INTERVENTIONS:  - Assess bowel function  - Encourage oral fluids to ensure adequate hydration  - Administer IV fluids if ordered to ensure adequate hydration  - Administer ordered medications as needed  - Encourage mobilization and activity  - Consider nutritional services referral to assist patient with adequate nutrition and appropriate food choices  Outcome: Progressing  Goal: Maintains adequate nutritional intake  Description: INTERVENTIONS:  - Monitor percentage of each meal consumed  - Identify factors contributing to decreased intake, treat as appropriate  - Assist with meals as needed  - Monitor I&O, weight, and lab values if indicated  - Obtain nutrition services referral as needed  Outcome: Progressing  Goal: Establish and maintain optimal ostomy function  Description: INTERVENTIONS:  - Assess bowel function  - Encourage oral fluids to ensure adequate hydration  - Administer IV fluids if ordered to ensure adequate hydration   - Administer ordered medications as needed  - Encourage mobilization and activity  - Nutrition services referral to assist patient with appropriate food choices  - Assess stoma site  - Consider wound care consult   Outcome: Progressing     Problem: GENITOURINARY - ADULT  Goal: Maintains or returns to baseline urinary function  Description: INTERVENTIONS:  - Assess urinary function  - Encourage oral fluids to ensure adequate hydration if ordered  - Administer IV fluids as ordered to ensure adequate hydration  - Administer ordered medications as needed  - Offer frequent toileting  - Follow urinary retention protocol if ordered  Outcome: Progressing     Problem: METABOLIC, FLUID AND ELECTROLYTES - ADULT  Goal: Electrolytes maintained within normal limits  Description: INTERVENTIONS:  - Monitor labs and assess patient for signs and symptoms of electrolyte imbalances  - Administer electrolyte replacement as ordered  - Monitor response to electrolyte replacements, including repeat lab results as appropriate  - Instruct patient on fluid and nutrition as appropriate  Outcome: Progressing  Goal: Fluid balance maintained  Description: INTERVENTIONS:  - Monitor labs   - Monitor I/O and WT  - Instruct patient on fluid and nutrition as appropriate  - Assess for signs & symptoms of volume excess or deficit  Outcome: Progressing  Goal: Glucose maintained within target range  Description: INTERVENTIONS:  - Monitor Blood Glucose as ordered  - Assess for signs and symptoms of hyperglycemia and hypoglycemia  - Administer ordered medications to maintain glucose within target range  - Assess nutritional intake and initiate nutrition service referral as needed  Outcome: Progressing     Problem: SKIN/TISSUE INTEGRITY - ADULT  Goal: Skin Integrity remains intact(Skin Breakdown Prevention)  Description: Assess:  -Perform Danyel assessment every daily  -Clean and moisturize skin every daily  -Inspect skin when repositioning, toileting, and assisting with ADLS  -Assess extremities for adequate circulation and sensation     Bed Management:  -Have minimal linens on bed & keep smooth, unwrinkled  -Change linens as needed when moist or perspiring  -Avoid sitting or lying in one position for more than 2 hours while in bed  -Keep HOB at 30degrees     Toileting:  -Offer bedside commode  -  Activity:  -Turn and reposition patient every 2 Hours  -Use appropriate equipment to lift or move patient in bed  -Instruct/ Assist with weight shifting every 2 when out of bed in chair    Skin Care:  -Avoid use of baby powder, tape, friction and shearing, hot water or constrictive clothing  Outcome: Progressing     Problem: HEMATOLOGIC - ADULT  Goal: Maintains hematologic stability  Description: INTERVENTIONS  - Assess for signs and symptoms of bleeding or hemorrhage  - Monitor labs  - Administer supportive blood products/factors as ordered and appropriate  Outcome: Progressing     Problem: MUSCULOSKELETAL - ADULT  Goal: Maintain or return mobility to safest level of function  Description: INTERVENTIONS:  - Assess patient's ability to carry out ADLs; assess patient's baseline for ADL function and identify physical deficits which impact ability to perform ADLs (bathing, care of mouth/teeth, toileting, grooming, dressing, etc )  - Assess/evaluate cause of self-care deficits   - Assess range of motion  - Assess patient's mobility  - Assess patient's need for assistive devices and provide as appropriate  - Encourage maximum independence but intervene and supervise when necessary  - Involve family in performance of ADLs  - Assess for home care needs following discharge   - Consider OT consult to assist with ADL evaluation and planning for discharge  - Provide patient education as appropriate  Outcome: Progressing  Goal: Maintain proper alignment of affected body part  Description: INTERVENTIONS:  - Support, maintain and protect limb and body alignment  - Provide patient/ family with appropriate education  Outcome: Progressing     Problem: PAIN - ADULT  Goal: Verbalizes/displays adequate comfort level or baseline comfort level  Description: Interventions:  - Encourage patient to monitor pain and request assistance  - Assess pain using appropriate pain scale  - Administer analgesics based on type and severity of pain and evaluate response  - Implement non-pharmacological measures as appropriate and evaluate response  - Consider cultural and social influences on pain and pain management  - Notify physician/advanced practitioner if interventions unsuccessful or patient reports new pain  Outcome: Progressing     Problem: SAFETY ADULT  Goal: Patient will remain free of falls  Description: INTERVENTIONS:  - Educate patient/family on patient safety including physical limitations  - Instruct patient to call for assistance with activity   - Consult OT/PT to assist with strengthening/mobility   - Keep Call bell within reach  - Keep bed low and locked with side rails adjusted as appropriate  - Keep care items and personal belongings within reach  - Initiate and maintain comfort rounds  - Make Fall Risk Sign visible to staff  - Offer Toileting every 2 Hours, in advance of need  - Initiate/Maintain bed/chair alarm  - Obtain necessary fall risk management equipment: sign  - Apply yellow socks and bracelet for high fall risk patients  - Consider moving patient to room near nurses station  Outcome: Progressing  Goal: Maintain or return to baseline ADL function  Description: INTERVENTIONS:  -  Assess patient's ability to carry out ADLs; assess patient's baseline for ADL function and identify physical deficits which impact ability to perform ADLs (bathing, care of mouth/teeth, toileting, grooming, dressing, etc )  - Assess/evaluate cause of self-care deficits   - Assess range of motion  - Assess patient's mobility; develop plan if impaired  - Assess patient's need for assistive devices and provide as appropriate  - Encourage maximum independence but intervene and supervise when necessary  - Involve family in performance of ADLs  - Assess for home care needs following discharge   - Consider OT consult to assist with ADL evaluation and planning for discharge  - Provide patient education as appropriate  Outcome: Progressing  Goal: Maintains/Returns to pre admission functional level  Description: INTERVENTIONS:  - Perform BMAT or MOVE assessment daily    - Set and communicate daily mobility goal to care team and patient/family/caregiver  - Collaborate with rehabilitation services on mobility goals if consulted  - Out of bed to chair 3 times a day   - Out of bed for meals 3 times a day  - Out of bed for toileting  - Record patient progress and toleration of activity level   Outcome: Progressing     Problem: DISCHARGE PLANNING  Goal: Discharge to home or other facility with appropriate resources  Description: INTERVENTIONS:  - Identify barriers to discharge w/patient and caregiver  - Arrange for needed discharge resources and transportation as appropriate  - Identify discharge learning needs (meds, wound care, etc )  - Arrange for interpretive services to assist at discharge as needed  - Refer to Case Management Department for coordinating discharge planning if the patient needs post-hospital services based on physician/advanced practitioner order or complex needs related to functional status, cognitive ability, or social support system  Outcome: Progressing     Problem: Knowledge Deficit  Goal: Patient/family/caregiver demonstrates understanding of disease process, treatment plan, medications, and discharge instructions  Description: Complete learning assessment and assess knowledge base    Interventions:  - Provide teaching at level of understanding  - Provide teaching via preferred learning methods  Outcome: Progressing

## 2021-09-13 NOTE — ED NOTES
Per dr Sari Bernheim hold dopamine  Pt maintaining systolic greater then 641 at this time        Nayana Bentley, RN  09/13/21 [id number]

## 2021-09-13 NOTE — ASSESSMENT & PLAN NOTE
This is a chronic condition  Patient uses CPAP at home  CPAP ordered  To be administered during hospital stay

## 2021-09-13 NOTE — ASSESSMENT & PLAN NOTE
Lab Results   Component Value Date    HGBA1C 5 5 05/21/2021     Recent Labs     09/13/21  1550 09/13/21  1807   POCGLU 122 177*     · Does not appear to be in any glycemic agents as an outpatient  · A1c reveals control as above  · Carb controlled diet for now  · May require SSI in the setting of acute infection at some point  · Monitor with morning labs

## 2021-09-13 NOTE — ASSESSMENT & PLAN NOTE
Patient is known to have permanent atrial fibrillation with rate control on Coumadin and metoprolol  Presented with atrial fibrillation with tachycardia  Continue Coumadin as documented  Titrate based on INR  Stop metoprolol and start Cardizem drip  Titrate based on blood pressure  To reassess accordingly  A m  CBC, coags   Monitor Is/Os and weight  Continue telemetry

## 2021-09-13 NOTE — ASSESSMENT & PLAN NOTE
· Stable; Present on admission as evidence by sodium 127 upon presentation  · Appears to be somewhat chronic on review of records with recent baseline of 130-135  · In the setting of acute infection as well as known liver dysfunction  · Status post 4 L of IV LR  · Continuous IVF with NS for now  · Fluid restriction for now  · Serial lab monitoring     Lab Results   Component Value Date    SODIUM 127 (L) 09/14/2021    SODIUM 127 (L) 09/13/2021

## 2021-09-14 PROBLEM — E88.09 HYPOALBUMINEMIA: Status: ACTIVE | Noted: 2021-01-01

## 2021-09-14 NOTE — PROGRESS NOTES
Vancomycin IV Pharmacy-to-Dose Consultation    Lucillejon Fairchild is a 57 y o  male who is currently receiving Vancomycin IV with management by the Pharmacy Consult service  Assessment/Plan:  The patient was reviewed  Renal function is stable and no signs or symptoms of nephrotoxicity and/or infusion reactions were documented in the chart  Based on todays assessment, continue current vancomycin (day # 2) dosing of 1500mg iv q12h with a plan for trough to be drawn at 1030 on 9/15/21  We will continue to follow the patients culture results and clinical progress daily      Marta Hernandez, Pharmacist

## 2021-09-14 NOTE — ASSESSMENT & PLAN NOTE
Erythema, tender, warm skin of lower abdomen  CT of abdomen shows dermal thickening, no soft tissue gas or inflammation changes to perineum  No concern for necrotizing fasciitis  Lactic acid normal  Continue current antibiotic regimen (IV cefepime, IV vanco)  Follow-up blood cultures

## 2021-09-14 NOTE — ASSESSMENT & PLAN NOTE
Chronic right-sided pleural effusion due to cirrhosis and CHF  Currently asymptomatic  IR consulted - recommends no need for therapeutic thoracentesis  Chest CT shows no signs of empyema, plus patient is afebrile and WBCs are decreasing  Agree, no thoracentesis at this time

## 2021-09-14 NOTE — CONSULTS
Interventional Radiology  Consultation 9/14/2021     Consults  Serg Celis   1960   [id number]      Assessment/Plan:  Given the chronic nature and small size of the pleural effusion, I would be inclined to not tap it for any therapeutic reason  Will communicate with the team, if the effusion is to be evaluated as a potential source for sepsis, then we would consider diagnostic tap  Per protocol, or cutoff for INR for for thoracentesis is 3  He is 3 03  Will communicate with team to determine timing of thoracentesis, and relative urgency      Medical Problems     Problem List     * (Principal) Septic shock ([facility] 75 )    Chronic diastolic (congestive) heart failure (HCC)    Wt Readings from Last 3 Encounters:   09/14/21 (!) 153 kg (336 lb 10 3 oz)   08/04/21 (!) 138 kg (305 lb)   07/07/21 (!) 139 kg (306 lb)                 Type 2 diabetes mellitus with diabetic chronic kidney disease ([place] [address] )    Lab Results   Component Value Date    HGBA1C 5 5 05/21/2021       Recent Labs     09/13/21  1550 09/13/21  1807 09/14/21  0959 09/14/21  1253   POCGLU 122 177* 216* 234*       Blood Sugar Average: Last 72 hrs:  (P) 187 25        Lymphedema    Chronic venous insufficiency    Varicose veins of both lower extremities with inflammation    Hypertensive heart and chronic kidney disease with heart failure and stage 1 through stage 4 chronic kidney disease, or chronic kidney disease ([place] [address] )    Lab Results   Component Value Date    EGFR 37 09/14/2021    EGFR 36 09/14/2021    EGFR 37 09/13/2021    CREATININE 1 89 (H) 09/14/2021    CREATININE 1 93 (H) 09/14/2021    CREATININE 1 89 (H) 09/13/2021         STEFF (obstructive sleep apnea)    Pleural effusion, right    Overview Signed 7/1/2021  1:21 PM by Sofya Walters PA-C     Diagnosis: Right pleural effusion   Procedure: Right thoracoscopic complete decortication on 6/2/21  Pathology: All cultures were negative            SOB (shortness of breath)    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 ascites ([place] [address] )    Tobacco abuse    Chronic anemia    Thrombocytopenia (HCC)    Other cirrhosis of liver ([place] [address] )    Encounter for screening for other viral diseases     Lactic acidosis    Atrial fibrillation ([place] [address] )    Diverticulosis of colon    Lesion of spleen    Type 2 diabetes mellitus with hyperglycemia, without long-term current use of insulin [facility])    Lab Results   Component Value Date    HGBA1C 5 5 05/21/2021       Recent Labs     09/13/21  1550 09/13/21  1807 09/14/21  0959 09/14/21  1253   POCGLU 122 177* 216* 234*       Blood Sugar Average: Last 72 hrs:  (P) 187 25        Chronic diastolic CHF (congestive heart failure) (HCC)    Wt Readings from Last 3 Encounters:   09/14/21 (!) 153 kg (336 lb 10 3 oz)   08/04/21 (!) 138 kg (305 lb)   07/07/21 (!) 139 kg (306 lb)                 Tobacco use    Hyponatremia    Constipation    Pulmonary hypertension (HCC)    Vitamin D deficiency    Atelectasis    Anemia    Hypercalcemia    Acute kidney injury superimposed on chronic kidney disease ([place] [address] )    Lab Results   Component Value Date    EGFR 37 09/14/2021    EGFR 36 09/14/2021    EGFR 37 09/13/2021    CREATININE 1 89 (H) 09/14/2021    CREATININE 1 93 (H) 09/14/2021    CREATININE 1 89 (H) 09/13/2021         Hypertensive nephrosclerosis    Chronic kidney disease-mineral and bone disorder    Lab Results   Component Value Date    EGFR 37 09/14/2021    EGFR 36 09/14/2021    EGFR 37 09/13/2021    CREATININE 1 89 (H) 09/14/2021    CREATININE 1 93 (H) 09/14/2021    CREATININE 1 89 (H) 09/13/2021         Hypotension    Right wrist pain    Overview Signed 6/1/2021  3:30 PM by Alexis Lopez DO     Patient with pain in his wrist after waking up on 05/31  Has been 10/10 at times  X-ray negative for osseous abnormalities  Wrist splint with minimal relief  Patient unable to receive NSAIDs in the setting of renal disease      Plan:  -Trial 1 time dose of prednisone 40 mg  -Continue monitor         Morbid (severe) obesity due to excess calories ([place] [address] )    Smoking    Iron deficiency    Chronic right-sided congestive heart failure (HCC)    Panniculitis    Hypoalbuminemia                  Subjective:     Patient ID: Concha Kim is a 57 y o  male  History of Present Illness  Patient with previous right pleural effusion  Loculated  TPA administration  Thoracoscopy  Multiple pleural adhesions  He was on for thoracentesis as an outpatient  In the meantime he came into the emergency room  There is a loculated pleural effusion  This is small, showing a maximal depth of 2 7 cm  Review of Systems      Past Medical History:   Diagnosis Date    A-fib [facility])     Acute renal failure superimposed on stage 3b chronic kidney disease ([place] [address] ) 03/15/2021    Cardiac disease     Chronic combined systolic (congestive) and diastolic (congestive) heart failure (HCC)     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 ascites (HCC)     Class 3 severe obesity due to excess calories with serious comorbidity and body mass index (BMI) of 45 0 to 49 9 in adult [facility]) 10/17/2018    Diabetes mellitus ([place] [address] )     Dilated cardiomyopathy ([place] [address] )     History of echocardiogram 11/24/2014    EF 0 30 (30%), Likely mod LV systolic dysfunction  Likely RV dysfunction as well   History of Lexiscan MPI 02/19/2016    EF 0 43 (43%), no prior MI or ischemia   Hx of echocardiogram 04/28/2017    Normal EF, Normal LV systolic function  Mild concentric LV hypertrophy  Mild mitral and tricuspid regurg      Hx: recurrent pneumonia     Hypertension     Hypokalemia 09/04/2020    Long term (current) use of anticoagulants     Morbid (severe) obesity due to excess calories (HCC)     Neuropathy     Pleural effusion on right     Stage 3 chronic kidney disease [facility])         Past Surgical History:   Procedure Laterality Date    HERNIA REPAIR      IR CHEST TUBE CHECK/CHANGE/REPOSITION/UPSIZE  5/27/2021    IR CHEST TUBE PLACEMENT  5/22/2021    IR THORACENTESIS  8/25/2020    IR THORACENTESIS 2/23/2021    LUNG DECORTICATION Right 6/2/2021    Procedure: DECORTICATION LUNG;  Surgeon: Kel Echeverria MD;  [facility];  Service: Thoracic    SPLENECTOMY, TOTAL      THORACOSCOPY VIDEO ASSISTED SURGERY (VATS) Right 6/2/2021    Procedure: THORACOSCOPY VIDEO ASSISTED SURGERY (VATS);   Surgeon: Kel Echeverria MD;  [facility];  Service: Thoracic    VASCULAR SURGERY Right     leg        Social History     Tobacco Use   Smoking Status Current Some Day Smoker    Packs/day: 0 25    Types: Cigarettes   Smokeless Tobacco Never Used   Tobacco Comment    smokes 1 pack per week and a half         Social History     Substance and Sexual Activity   Alcohol Use Yes    Comment: 4 or 5 drinks weekly/bloody chani's        Social History     Substance and Sexual Activity   Drug Use Never        No Known Allergies    Current Facility-Administered Medications   Medication Dose Route Frequency Provider Last Rate Last Admin    acetaminophen (TYLENOL) tablet 650 mg  650 mg Oral Q6H PRN Iris De La Torre MD        albumin human (FLEXBUMIN) 25 % injection 25 g  25 g Intravenous Q6H TRES Mejias   Stopped at 09/14/21 0935    cefepime (MAXIPIME) IVPB (premix in dextrose) 2,000 mg 50 mL  2,000 mg Intravenous Q12H Iris De La Torre MD   Stopped at 09/14/21 1005    diltiazem (CARDIZEM) 125 mg in sodium chloride 0 9 % 125 mL infusion  1-15 mg/hr Intravenous Titrated Iris De La Torre MD 12 5 mL/hr at 09/14/21 1204 12 5 mg/hr at 09/14/21 1204    metoprolol tartrate (LOPRESSOR) tablet 25 mg  25 mg Oral Q12H 100 Medical Markle, MD   25 mg at 09/14/21 0831    nicotine (NICODERM CQ) 14 mg/24hr TD 24 hr patch 1 patch  1 patch Transdermal Daily Iris De La Torre MD        norepinephrine (LEVOPHED) 4 mg (STANDARD CONCENTRATION) IV in sodium chloride 0 9% 250 mL  1-30 mcg/min Intravenous Titrated Iris De La Torre MD 7 5 mL/hr at 09/14/21 1204 2 mcg/min at 09/14/21 1204    ondansetron (ZOFRAN) injection 4 mg  4 mg Intravenous Q6H PRN Leanna Li MD        pantoprazole (PROTONIX) EC tablet 40 mg  40 mg Oral Early Morning Leanna Li MD   40 mg at 09/14/21 0539    polyethylene glycol (MIRALAX) packet 17 g  17 g Oral Daily PRN Leanna Li MD        sodium chloride 0 9 % infusion  75 mL/hr Intravenous Continuous Leanna Li MD 75 mL/hr at 09/14/21 0831 75 mL/hr at 09/14/21 0831    vancomycin (VANCOCIN) 1,500 mg in sodium chloride 0 9 % 500 mL IVPB  15 mg/kg (Adjusted) Intravenous Q12H Leanna Li  mL/hr at 09/14/21 1132 1,500 mg at 09/14/21 1132          Objective:    Vitals:    09/14/21 1230 09/14/21 1240 09/14/21 1242 09/14/21 1250   BP: 111/68 101/74 104/69 105/78   BP Location:       Pulse: 94 92 95 88   Resp: 20 20 20 20   Temp:   98 °F (36 7 °C)    TempSrc:   Temporal    SpO2: 98% 99% 98% 99%   Weight:       Height:            Physical Exam      5 minutes spent communicating with the referring team     Lab Results   Component Value Date     (H) 12/16/2015      Lab Results   Component Value Date    WBC 15 98 (H) 09/14/2021    HGB 6 8 (LL) 09/14/2021    HCT 22 4 (L) 09/14/2021    MCV 93 09/14/2021     09/14/2021     Lab Results   Component Value Date    INR 3 03 (H) 09/14/2021    INR 2 81 (H) 09/13/2021    INR 2 90 (H) 09/13/2021    PROTIME 29 6 (H) 09/14/2021    PROTIME 27 9 (H) 09/13/2021    PROTIME 28 6 (H) 09/13/2021     Lab Results   Component Value Date    PTT 82 (H) 09/13/2021         I have personally reviewed pertinent imaging and laboratory results  Code Status: Level 1 - Full Code  Advance Directive and Living Will:      Power of :    POLST:      IR has been consulted to evaluate the patient, determine the appropriate procedure, and whether or not a procedure can and should be performed  Thank you for allowing me to participate in the care of [employer]  Please don't hesitate to call, text, email, or [employer] with any questions  This text is generated with voice recognition software  There may be translation, syntax,  or grammatical errors  If you have any questions, please contact the dictating provider

## 2021-09-14 NOTE — CONSULTS
[address] 1960, 57 y o  male MRN: [id number]  Unit/Bed#:  Encounter: [id number]  Primary Care Provider: Shelton Avery DO   Date and time admitted to hospital: 9/13/2021  9:51 AM      * Septic shock [facility])  Assessment & Plan  Sepsis with septic shock  Tachycardic, hypotensive, leukocytosis, panniculitis as source  Hypotension has resolved  Recommending to stop IV fluids  Continue Levophed for milligram    Continue antibiotics, agree with IV cefepime 2000 t i d  and vanco 1500 b i d  Ordered MRSA  Follow-up blood cultures        Panniculitis  Assessment & Plan  Erythema, tender, warm skin of lower abdomen  CT of abdomen shows dermal thickening, no soft tissue gas or inflammation changes to perineum  No concern for necrotizing fasciitis  Lactic acid normal  Continue current antibiotic regimen (IV cefepime, IV vanco)  Follow-up blood cultures    Atrial fibrillation (HCC)  Assessment & Plan  Chronic Afib  Continue rate control with metoprolol and Cardizem    Pleural effusion, right  Assessment & Plan  Chronic right-sided pleural effusion due to cirrhosis and CHF  Currently asymptomatic  IR consulted - recommends no need for therapeutic thoracentesis  Chest CT shows no signs of empyema, plus patient is afebrile and WBCs are decreasing  Agree, no thoracentesis at this time         History of Present Illness   Physician Requesting Consult: Issac Ray MD  Reason for Consult / Principal Problem:  Septic shock and pleural effusion  Hx and PE limited by:  None  HPI: Serg Celis is a 57y o  year old male who presents with lower abdominal pain for 1 day  Past medical history includes obesity, diabetes, heart failure, AFib, chronic neck insufficiency, CKD, pulmonary hypertension, AVMs  Patient presented to ED with red, warm, tender skin across lower abdomen    He was hypotensive, tachycardic, tachypneic, febrile, leukocytosis and met sepsis criteria with source of panniculitis  Patient seen at bedside today, sitting up comfortably in chair, no acute distress  Reports feeling better than yesterday, feels more alert  Only complaint today is lower abdominal pain similar to yesterday  Denies any shortness of breath or cough  Wants to be able to stand up and move around more with use of cane  No new complaints today  Review of Systems   Constitutional: Negative for chills and fever  HENT: Negative for ear pain and sore throat  Eyes: Negative for pain and visual disturbance  Respiratory: Negative for cough and shortness of breath  Cardiovascular: Positive for leg swelling  Negative for chest pain and palpitations  Swollen lower legs   Gastrointestinal: Positive for abdominal pain  Negative for vomiting  Genitourinary: Positive for difficulty urinating  Negative for dysuria and hematuria  Did not urinate for 12 hours   Musculoskeletal: Negative for arthralgias and back pain  Skin: Positive for color change and rash  Skin of lower abdomen is painful   Neurological: Negative for seizures and syncope  Psychiatric/Behavioral: Negative for behavioral problems  All other systems reviewed and are negative  Historical Information   Past Medical History:   Diagnosis Date    A-fib [facility])     Acute renal failure superimposed on stage 3b chronic kidney disease ([place] [address] ) 03/15/2021    Cardiac disease     Chronic combined systolic (congestive) and diastolic (congestive) heart failure (HCC)     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 ascites (HCC)     Class 3 severe obesity due to excess calories with serious comorbidity and body mass index (BMI) of 45 0 to 49 9 in adult [facility]) 10/17/2018    Diabetes mellitus ([facility] )     Dilated cardiomyopathy ([place] [address] )     History of echocardiogram 11/24/2014    EF 0 30 (30%), Likely mod LV systolic dysfunction  Likely RV dysfunction as well      History of Lexiscan MPI 02/19/2016    EF 0 43 (43%), no prior MI or ischemia   Hx of echocardiogram 04/28/2017    Normal EF, Normal LV systolic function  Mild concentric LV hypertrophy  Mild mitral and tricuspid regurg   Hx: recurrent pneumonia     Hypertension     Hypokalemia 09/04/2020    Long term (current) use of anticoagulants     Morbid (severe) obesity due to excess calories (HCC)     Neuropathy     Pleural effusion on right     Stage 3 chronic kidney disease [facility])      Past Surgical History:   Procedure Laterality Date    HERNIA REPAIR      IR CHEST TUBE CHECK/CHANGE/REPOSITION/UPSIZE  5/27/2021    IR CHEST TUBE PLACEMENT  5/22/2021    IR THORACENTESIS  8/25/2020    IR THORACENTESIS  2/23/2021    LUNG DECORTICATION Right 6/2/2021    Procedure: DECORTICATION LUNG;  Surgeon: Arian Croft MD;  [facility];  Service: Thoracic    SPLENECTOMY, TOTAL      THORACOSCOPY VIDEO ASSISTED SURGERY (VATS) Right 6/2/2021    Procedure: THORACOSCOPY VIDEO ASSISTED SURGERY (VATS); Surgeon: Arian Croft MD;  [facility];  Service: Thoracic    VASCULAR SURGERY Right     leg     Social History   Social History     Substance and Sexual Activity   Alcohol Use Yes    Comment: 4 or 5 drinks weekly/bloody chani's     Social History     Substance and Sexual Activity   Drug Use Never     E-Cigarette/Vaping    E-Cigarette Use Never User      E-Cigarette/Vaping Substances    Nicotine No     THC No     CBD No     Flavoring No     Other No     Unknown No      Social History     Tobacco Use   Smoking Status Current Some Day Smoker    Packs/day: 0 25    Types: Cigarettes   Smokeless Tobacco Never Used   Tobacco Comment    smokes 1 pack per week and a half      Occupational History:  Unknown    Family History: History reviewed  No pertinent family history  Meds/Allergies   all current active meds have been reviewed    No Known Allergies    Objective   Vitals: Blood pressure 102/63, pulse 98, temperature 97 8 °F (36 6 °C), resp  rate 20, height 6' (1 829 m), weight (!) 153 kg (336 lb 10 3 oz), SpO2 96 %  ,Body mass index is 45 66 kg/m²  Intake/Output Summary (Last 24 hours) at 9/14/2021 1609  Last data filed at 9/14/2021 1450  Gross per 24 hour   Intake 4256 01 ml   Output 450 ml   Net 3806 01 ml     Invasive Devices     Central Venous Catheter Line            CVC Central Lines 09/14/21 Triple 20cm <1 day          Peripheral Intravenous Line            Peripheral IV 09/13/21 Right Antecubital 1 day    Peripheral IV 09/13/21 Right Wrist 1 day                Physical Exam  Vitals and nursing note reviewed  Constitutional:       Appearance: He is well-developed  He is obese  Comments: Right neck central line   HENT:      Head: Normocephalic and atraumatic  Eyes:      Conjunctiva/sclera: Conjunctivae normal    Cardiovascular:      Rate and Rhythm: Normal rate and regular rhythm  Heart sounds: No murmur heard  Pulmonary:      Effort: Pulmonary effort is normal  No respiratory distress  Breath sounds: Normal breath sounds  Comments: Decreased breath sounds on right side  Abdominal:      General: Abdomen is protuberant  Palpations: Abdomen is soft  There is no fluid wave  Tenderness: There is abdominal tenderness in the right lower quadrant and left lower quadrant  Musculoskeletal:      Cervical back: Neck supple  Skin:     General: Skin is warm and dry  Comments: Erythema, tender, warm skin across lower abdomen and in skin folds below stomach  Bilateral lower leg edema  With no erythema hardened skin  Neurological:      Mental Status: He is alert  Lab Results: I have personally reviewed pertinent lab results  Imaging Studies: I have personally reviewed pertinent reports  EKG, Pathology, and Other Studies: I have personally reviewed pertinent reports      VTE Prophylaxis: Reason for no pharmacologic prophylaxis INR 3 03    Code Status: Level 1 - Full Code

## 2021-09-14 NOTE — ASSESSMENT & PLAN NOTE
· Present on admission as evidence by albumin of 1 8  · In the setting of alcoholic liver cirrhosis without ascites  · Continue albumin infusions to further assist with edema and blood pressure support  · Monitor values with CMP

## 2021-09-14 NOTE — ASSESSMENT & PLAN NOTE
Sepsis with septic shock  Tachycardic, hypotensive, leukocytosis, panniculitis as source  Hypotension has resolved  Recommending to stop IV fluids  Continue Levophed for milligram    Continue antibiotics, agree with IV cefepime 2000 t i d  and vanco 1500 b i d    Ordered MRSA  Follow-up blood cultures

## 2021-09-14 NOTE — ED PROCEDURE NOTE
PROCEDURE  Central Line    Date/Time: 9/14/2021 12:57 AM  Performed by: Sindhu Auguste DO  Authorized by: Sindhu Auguste DO     Patient location:  ICU  Other Assisting Provider: No    Consent:     Consent obtained:  Verbal, written and emergent situation    Consent given by:  Patient    Risks discussed:  Arterial puncture, incorrect placement, bleeding, infection, pneumothorax and nerve damage    Alternatives discussed:  No treatment  Universal protocol:     Procedure explained and questions answered to patient or proxy's satisfaction: yes      Relevant documents present and verified: yes      Test results available and properly labeled: yes      Radiology Images displayed and confirmed  If images not available, report reviewed: yes      Patient identity confirmed:  Verbally with patient  Pre-procedure details:     Hand hygiene: Hand hygiene performed prior to insertion      Sterile barrier technique:  All elements of maximal sterile technique followed      Skin preparation:  ChloraPrep    Skin preparation agent: Skin preparation agent completely dried prior to procedure    Indications:     Central line indications: medications requiring central line    Procedure details:     Location:  Right internal jugular    Vessel type: vein      Laterality:  Right    Approach: percutaneous technique used      Patient position:  Flat    Catheter type:  Triple lumen 20cm    Catheter size:  7 Fr    Landmarks identified: yes      Ultrasound guidance: yes      Ultrasound image availability:  Not obtained due to urgency    Sterile ultrasound techniques: Sterile gel and sterile probe covers were used      Manometry confirmation: no      Number of attempts:  1    Successful placement: yes    Post-procedure details:     Post-procedure:  Dressing applied and line sutured    Assessment:  Blood return through all ports, free fluid flow, no pneumothorax on x-ray and placement verified by x-ray    Post-procedure complications: none      Patient tolerance of procedure:   Tolerated well, no immediate complications         Yassine Russ DO  09/14/21 0136

## 2021-09-14 NOTE — UTILIZATION REVIEW
Initial Clinical Review    Admission: Date/Time/Statement:   Admission Orders (From admission, onward)     Ordered        09/13/21 1506  Inpatient Admission  Once                   Orders Placed This Encounter   Procedures    Inpatient Admission     Standing Status:   Standing     Number of Occurrences:   1     Order Specific Question:   Level of Care     Answer:   Critical Care [15]     Order Specific Question:   Estimated length of stay     Answer:   More than 2 Midnights     Order Specific Question:   Certification     Answer:   I certify that inpatient services are medically necessary for this patient for a duration of greater than two midnights  See H&P and MD Progress Notes for additional information about the patient's course of treatment  ED Arrival Information     Expected Arrival Acuity    - 9/13/2021 09:45 Emergent         Means of arrival Escorted by Service Admission type    [facility]         Arrival complaint    Abdominal Pain        Chief Complaint   Patient presents with    Abdominal Pain     stomach swollen raw and painful since last night       Initial Presentation: 58 yo m w/extensive pmhx including chf, a fib, ckd, liver cirrhosis to ED from home admitted as inpatient to critical care due to septic shock, hyponatremia, pleural effusion, panniculitis, and MARISABEL  Presented with 1 day of constant sharp cramping low abd pain, 5 to 9/10, worse w/minimal exertion, intermittent fevers with nausea  Has BLE wounds from his dog scratching him  Exam reveals hypotensive, irreg rhythm, tachypnea, wheezing, rales, tender abdomen/suprapubic area, absent bowel sounds, +1 BLE pitting edema w/stasis dermatitis from knees to ankles  CT chest a&p showed diffuse anasarca, loculated R pleural effusion, pulm edema; EKG a fib  Aggressive IVF, IV antbx, pressors, tele in progress  Date: 9/14   Day 2: central line placed during the night   Remained tachy in a fib, tachypneic until this am, bp improved during the night, feeling better, but has persistent abdominal pain & tenderness  BLE edema persists  He is chronically anemic, hgb dropped to 6 4; prbc's ordered and anticoag placed on hold  MARISABEL improving on IVF  Baseline creat 1 6  remains hyponatremic, level unchanged, appears to be somewhat chronic-baseline 130-135  Now 127  Continues on IVF, pressors, IV antbx  Was scheduled for outpt thoracentesis today, will consult IR to address this while in house  IV diuretics are on hold due to septic shock, monitoring fluids/volume  euvolemic on exam  Albumin is low, replacing via transfusion  Per pulm: infectious source is panniculitis  Chronic R loculated pleural effusion hasn't changed over the last several weeks and he has no respiratory sx  Continue IV antbx, follow cultures, wean pressors to MAP>65, can likely dc IVF in light of ongoing diuresis  Do not recommend thoracentesis, as the effusion is not likely contributing to his sx  If panniculitis has been treated and still has evidence of sepsis, then consider thoracentesis  Date 9/15 day 3: bp improving, appears volume overloaded, edema worsening  hold further IVF  Remains on pressors, no growth on cultures in 24 hrs, procalcitonin increasing  Has RIJ access  MARISABEL worsening, creat 2  3  will again give diuretic, check urine studies, unable to complete bladder scan due to painful panniculitis  Consider renal US, consulted renal  Restarted beta blocker yesterday, increased the dose today, weaning off cardizem gtt  Na at 128  Remains afebrile  Exam: tachy irreg rhythm, bibasilar crackles, decreased R basilar sounds, distended abd, BLE edema, warm/erythemic pannus       ED Triage Vitals   Temperature Pulse Respirations Blood Pressure SpO2   09/13/21 0954 09/13/21 0954 09/13/21 0954 09/13/21 0954 09/13/21 0954   98 4 °F (36 9 °C) (!) 152 20 108/63 96 %      Temp Source Heart Rate Source Patient Position - Orthostatic VS BP Location FiO2 (%)   09/13/21 5237 09/13/21 0954 09/13/21 0954 09/13/21 0954 09/13/21 2300   Temporal Monitor Sitting Left arm 30      Pain Score       09/13/21 0954       7          Weight (last 2 days)     Date/Time   Weight    09/15/21 0554   (!) 156 (344 14)    09/14/21 1013   (!) 153 (336 64)    09/13/21 1715   (!) 152 (335 76)    09/13/21 1602   (!) 152 (335 76)    09/13/21 0954   (!) 144 (318 34)              Additional Vital Signs:   Date/Time  Temp  Pulse  Resp  BP  MAP (mmHg)  SpO2  FiO2 (%)  O2 Device  O2 Interface Device  Patient Position - Orthostatic VS   09/15/21 1003  97 8 °F (36 6 °C)  94  20  111/68  --  --  --  --  --  --   09/15/21 0930  --  94  18  110/69  86  98 %  --  --  --  --   09/15/21 0915  --  87  20  130/65  85  97 %  --  --  --  --   09/15/21 0900  --  90  20  117/70  85  97 %  --  --  --  --   09/15/21 0845  --  87  20  109/69  85  97 %  --  --  --  --   09/15/21 0830  --  94  22  112/63  81  97 %  --  --  --  --   09/15/21 0815  --  94  22  109/62  78  96 %  --  --  --  --   09/15/21 0800  --  89  20  110/60  79  96 %  --  --  --  --   09/15/21 0745  --  87  22  106/62  77  96 %  --  --  --  --   09/15/21 0728  97 8 °F (36 6 °C)  85  20  100/67  --  96 %  --  None (Room air)  --  --   09/15/21 0715  --  95  20  103/69  79  95 %  --  --  --  --   09/15/21 0712  96 8 °F (36 °C)Abnormal   91  20  92/64  --  95 %  --  None (Room air)  --  --   09/15/21 0700  96 8 °F (36 °C)Abnormal   87  20  95/53  71  95 %  --  None (Room air)  --  --   09/15/21 0530  --  85  20  109/68  82  94 %  --  None (Room air)  --  --   09/15/21 0400  97 4 °F (36 3 °C)Abnormal   78  18  106/61  78  93 %  --  --  --  --   09/15/21 0330  --  81  18  105/62  78  93 %  --  --  --   --   O2 Interface Device: Pt  off BiPAP at 09/15/21 0330   09/15/21 0300  --  84  20  104/78  87  96 %  --  None (Room air)  --  --   09/15/21 0215  --  80  23Abnormal   95/51  69  96 %  --  None (Room air)  --  --   09/15/21 0200  --  85  19  107/70  84  94 %  --  -- --  --   09/15/21 0145  --  91  18  115/59  84  94 %  --  None (Room air)   --  --   O2 Device: patient wanted bipap off at 09/15/21 0145   09/15/21 0130  --  99  18  113/74  89  94 %  --  BiPAP  --  --   09/15/21 0115  --  92  20  105/58  78  90 %  --  --  --  --   09/15/21 0100  --  99  20  105/65  82  92 %  --  --  --  --   09/15/21 0045  --  93  20  93/50  68  98 %  --  --  --  --   09/15/21 0030  --  85  18  90/47Abnormal   66  98 %  --  --  --  --   09/15/21 0000  --  87  20  102/51  74  98 %  --  BiPAP  --  --   09/14/21 2330  --  98  20  97/53  70  98 %  --  --  --  --   09/14/21 2300  --  92  20  105/66  79  98 %  --  --  --  --   09/14/21 2245  --  87  18  114/55  77  99 %  --  --  --  --   09/14/21 2230  --  --  --  103/69  82  --  --  --  --  --   09/14/21 2215  97 5 °F (36 4 °C)  97  22  107/60  77  94 %  30  BiPAP  Full face mask  --   09/14/21 2200  --  105  20  97/73  78  95 %  --  --  --  --   09/14/21 2145  --  --  --  112/56  81  --  --  --  --  --   09/14/21 2130  --  96  20  118/67  87  95 %  --  --  --  --   09/14/21 2115  --  101  18  108/68  84  95 %  --  --  --  --   09/14/21 2106  --  --  --  120/58  --  --  --  --  --  --   09/14/21 2100  --  99  20  120/58  83  95 %  --  --  --  --   09/14/21 2045  --  100  20  150/63  91  97 %  --  --  --  --   09/14/21 2030  --  100  20  120/56  80  96 %  --  --  --  --   09/14/21 2015  --  102  18  122/59  82  95 %  --  --  --  --   09/14/21 2000  --  99  18  101/57  74  98 %  --  --  --  --   09/14/21 1945  --  100  22  118/53  77  97 %  --  --  --  --   09/14/21 1930  --  106Abnormal   20  119/53  76  96 %  --  --  --  --   09/14/21 1915  --  98  18  114/53  76  97 %  --  --  --  --   09/14/21 1900  --  96  22  110/64  82  98 %  --  --  --  --   09/14/21 1840  --  94  22  116/80  90  97 %  --  None (Room air)  --  --   09/14/21 1820  --  114Abnormal   43Abnormal   93/67  77  98 %  --  --  --  --   09/14/21 1810  --  97  33Abnormal   93/60  71  95 % --  --  --  --   09/14/21 1800  --  95  22  118/73  91  96 %  --  --  --  --   09/14/21 1750  --  94  22  126/67  90  98 %  --  --  --  --   09/14/21 1740  --  95  20  113/73  88  97 %  --  --  --  --   09/14/21 1730  --  93  20  106/65  81  96 %  --  --  --  --   09/14/21 1720  --  93  18  99/64  78  98 %  --  --  --  --   09/14/21 1710  --  94  20  111/65  82  98 %  --  --  --  --   09/14/21 1700  --  90  22  104/62  79  97 %  --  --  --  --   09/14/21 1630  --  101  22  97/55  74  98 %  --  --  --  --   09/14/21 1600  --  91  20  114/75  83  98 %  --  --  --  --   09/14/21 1540  --  98  20  102/63  77  96 %  --  --  --  --   09/14/21 1530  --  87  33Abnormal   101/61  75  97 %  --  --  --  --   09/14/21 1520  --  84  32Abnormal   98/54  74  99 %  --  --  --  --   09/14/21 1510  --  83  26Abnormal   114/60  78  98 %  --  --  --  --   09/14/21 1500  --  84  28Abnormal   101/70  78  94 %  --  --  --  --       Date/Time  Temp  Pulse  Resp  BP  MAP (mmHg)  SpO2  FiO2 (%)  O2 Device  O2 Interface Device  Patient Position - Orthostatic VS   09/14/21 1200  --  88  22  124/62  85  98 %  --  --  --  --   09/14/21 1115  --  94  16  108/69  85  86 %Abnormal   --  --  --  --   09/14/21 1100  --  86  37Abnormal   115/53  77  98 %  --  --  --  --   09/14/21 1045  --  89  19  116/79  92  98 %  --  --  --  --   09/14/21 1030  --  93  24Abnormal   109/75  86  99 %  --  --  --  --   09/14/21 1015  --  101  --  130/62  81  96 %  --  --  --  --   09/14/21 1000  --  100  39Abnormal   110/58  79  97 %  --  --  --  --   09/14/21 0945  --  105  32Abnormal   111/55  75  95 %  --  --  --  --   09/14/21 0930  --  103  37Abnormal   117/58  84  97 %  --  --  --  --   09/14/21 0915  --  107Abnormal   32Abnormal   117/58  82  96 %  --  --  --  --   09/14/21 0845  --  106Abnormal   25Abnormal   137/58  80  97 %  --  --  --  --   09/14/21 0830  --  105  30Abnormal   121/61  84  97 %  --  --  --  --   09/14/21 0826  98 5 °F (36 9 °C)  93  20 113/57  78  96 %  --  --  --  --   09/14/21 0815  96 9 °F (36 1 °C)Abnormal   110Abnormal   23Abnormal   105/59  77  97 %  --  --  --  --   09/14/21 0800  --  97  36Abnormal   92/52  68  97 %  --  --  --  --   09/14/21 0745  --  100  35Abnormal   114/56  80  96 %  --  --  --  --   09/14/21 0730  --  100  21  120/75  92  97 %  --  --  --  --   09/14/21 0715  96 9 °F (36 1 °C)Abnormal   115Abnormal   20  122/76  --  94 %  --  None (Room air)  --  --   09/14/21 0700  --  102  20  101/57  74  96 %  --  --  --  --   09/14/21 0659  97 9 °F (36 6 °C)  --  --  --  --  --  --  --  --  --   09/14/21 0615  97 8 °F (36 6 °C)  101  30Abnormal   123/61  --  95 %  --  None (Room air)  --  --   09/14/21 0545  97 8 °F (36 6 °C)  100  20  120/70  --  96 %  --  None (Room air)  --  --   09/14/21 0530  97 9 °F (36 6 °C)  107Abnormal   18  102/62  --  95 %  --  None (Room air)  --  --   09/14/21 0520  97 8 °F (36 6 °C)  108Abnormal   18  109/66  --  95 %  --  None (Room air)  --  --   09/14/21 0515  97 8 °F (36 6 °C)  105  18  110/68  81  94 %  --  None (Room air)  --  Lying   09/14/21 0511  --  --  --  --  --  --  --  None (Room air)  --  --   09/14/21 0506  97 8 °F (36 6 °C)  108Abnormal   20  98/66  --  95 %  --  None (Room air)  --  --   09/14/21 0500  97 8 °F (36 6 °C)  107Abnormal   30Abnormal   92/62  73  94 %  --  --  --  --   09/14/21 0430  --  109Abnormal   17  109/63  77  95 %  --  --  --  --   09/14/21 0415  --  105  28Abnormal   108/56  77  94 %  --  --  --  --   09/14/21 0400  --  103  23Abnormal   113/56  81  95 %  --  --  --  --   09/14/21 0345  --  110Abnormal   20  106/60  77  95 %  --  --  --  --   09/14/21 0330  --  --  --  90/51  69  --  --  --  --  --   09/14/21 0315  --  114Abnormal   36Abnormal   104/65  80  95 %  --  --  --  --   09/14/21 0300  --  110Abnormal   40Abnormal   125/60  85  94 %  --  --  --  --   09/14/21 0245  --  107Abnormal   38Abnormal   113/60  78  94 %  --  --  --  --   09/14/21 0230  -- 106Abnormal   26Abnormal   111/64  80  94 %  --  --  --  --   09/14/21 0215  --  109Abnormal   22  108/57  76  93 %  --  None (Room air)  --  --   09/14/21 0200  --  108Abnormal   22  112/66  79  100 %  --  --  --  --   09/14/21 0145  --  105  20  94/54  68  92 %  --  --  --  --   09/14/21 0130  --  109Abnormal   20  97/54  72  94 %  --  --  --  --   09/14/21 0115  --  114Abnormal   --  119/60  85  92 %  --  --  --  --   09/14/21 0100  --  109Abnormal   16  113/55  77  94 %  --  --  --  --   09/14/21 0045  --  112Abnormal   20  105/69  82  94 %  --  --  --  --   09/14/21 0030  --  108Abnormal   12  105/60  76  93 %  --  --  --  --   09/14/21 0015  --  116Abnormal   22  97/56  69  93 %  --  --  --  --   09/14/21 0000  --  105  20  101/51  71  99 %  --  --  --  --   09/13/21 2345  97 3 °F (36 3 °C)Abnormal   106Abnormal   23Abnormal   104/58  75  99 %  30  BiPAP  --  Lying   09/13/21 2330  --  110Abnormal   19  90/65  73  98 %  --  --  --  --   09/13/21 2315  --  113Abnormal   19  93/56  64  94 %  --  --  --  --   09/13/21 2300  --  --  --  --  --  98 %  30  BiPAP  Full face mask  --   09/13/21 2245  --  112Abnormal   21  104/56  75  94 %  --  --  --  --   09/13/21 2230  --  111Abnormal   35Abnormal   107/56  76  96 %  --  --  --  --   09/13/21 2215  --  112Abnormal   38Abnormal   84/50Abnormal   58  95 %  --  --  --  --   09/13/21 2200  --  117Abnormal   35Abnormal   95/51  70  95 %  --  --  --  --   09/13/21 2145  --  119Abnormal   42Abnormal   98/50  70  96 %  --  --  --  --   09/13/21 2130  --  115Abnormal   20  101/54  71  93 %  --  --  --  --   09/13/21 2115  --  113Abnormal   23Abnormal   90/53  67  93 %  --  --  --  --   09/13/21 2100  --  118Abnormal   10Abnormal   95/53  70  95 %  --  --  --  --   09/13/21 2045  --  124Abnormal   21  92/51  67  95 %  --  --  --  --   09/13/21 2030  --  122Abnormal   20  91/58  67  94 %  --  --  --  --   09/13/21 2015  --  128Abnormal   15  90/51  66  93 %  --  --  --  -- 09/13/21 2000  --  133Abnormal   22  95/54  67  94 %  --  --  --  --   09/13/21 1945  --  129Abnormal   18  94/51  68  93 %  --  --  --  --   09/13/21 1930  --  132Abnormal   12  95/54  73  93 %  --  --  --  --   09/13/21 1915  --  133Abnormal   12  92/50  66  93 %  --  --  --  --   09/13/21 1900  --  137Abnormal   18  93/53  68  93 %  --  --  --  --   09/13/21 1845  --  130Abnormal   12  88/51Abnormal   66  93 %  --  --  --  --   09/13/21 1830  --  133Abnormal   12  87/51Abnormal   64  94 %  --  --  --  --   09/13/21 1815  --  139Abnormal   12  93/55  68  93 %  --  --  --  --   09/13/21 1745  --  136Abnormal   10Abnormal   77/49Abnormal   58  93 %  --  --  --  --   09/13/21 1715  --  136Abnormal   35Abnormal   85/53Abnormal   65  93 %  --  --  --  --   09/13/21 1645  --  131Abnormal   31Abnormal   83/53Abnormal   63  90 %  --  --  --  --   09/13/21 1602  98 8 °F (37 1 °C)  133Abnormal   31Abnormal   112/59  76  96 %  --  None (Room air)  --  Lying   09/13/21 1445  --  141Abnormal   20  102/57  72  99 %  --  None (Room air)  --  Sitting   09/13/21 1415  --  139Abnormal   20  106/50  72  99 %  --  None (Room air)  --  Sitting   09/13/21 1315  --  143Abnormal   28Abnormal   91/50  60  99 %  --  None (Room air)  --  Sitting   09/13/21 1300  --  141Abnormal   18  91/44Abnormal   --  98 %  --  None (Room air)  --  Sitting   09/13/21 1245  --  147Abnormal   18  86/52Abnormal   64  98 %  --  None (Room air)  --  Sitting   09/13/21 1230  --  145Abnormal   16  105/57  62  99 %  --  None (Room air)  --  Sitting   09/13/21 1215  --  142Abnormal   38Abnormal   106/54  76  95 %  --  None (Room air)  --  Sitting   09/13/21 1200  --  144Abnormal   20  103/52  71  93 %  --  None (Room air)  --  Lying   09/13/21 1145  --  143Abnormal   20  94/65  73  95 %  --  None (Room air)  --  Lying   09/13/21 1107  101 9 °F (38 8 °C)Abnormal   --  --  --  --  --  --  --  --  --   09/13/21 1100  --  138Abnormal   20  79/35Abnormal   --  97 % --  None (Room air)  --  Lying   09/13/21 1030  --  138Abnormal   20  88/50Abnormal   64  96 %  --  None (Room air)  --  Lying   09/13/21 1015  --  154Abnormal   20  98/56  69  95 %  --  None (Room air)  --  Lying       Pertinent Labs/Diagnostic Test Results:        XR chest portable ICU   Final Result by Adan Grossman MD (09/14 0517)      Right jugular catheter in right atrium  To be at the cavoatrial junction, it could be retracted 3 cm  Redemonstration of right base opacity due to small chronic loculated right pleural effusion and rounded atelectasis in the right base  Workstation performed: [id number]         CT chest abdomen pelvis wo contrast   Final Result by Hiram Dubon MD (09/13 8206)      1  Disproportionate dermal thickening and subcutaneous edema in the ventral lower pannus without encapsulated collections, soft tissue gas, or inflammatory changes extending into the perineum  Finding is superimposed on mild diffuse anasarca  2   Large, chronic, partially loculated right pleural effusion with associated rounded atelectasis at right lung base  3   Mild pulmonary interstitial edema without alveolar consolidations                    Workstation performed: [id number]           9/13 EKG rapid a fib, incomplete RBBB  Results from last 7 days   Lab Units 09/15/21  0520 09/15/21  0041 09/14/21  1734 09/14/21  1040 09/14/21  0224 09/14/21  0012 09/13/21  1009   WBC Thousand/uL 13 30*  --   --  15 98*  --  17 97* 18 40*   HEMOGLOBIN g/dL 7 2* 6 8* 7 3* 6 8* 6 4* 6 6* 7 4*   HEMATOCRIT % 23 5* 22 4* 24 5* 22 4* 21 1* 21 5* 23 9*   PLATELETS Thousands/uL 246  --   --  252  --  271 294   NEUTROS ABS Thousands/µL 11 58*  --   --  14 29*  --  16 34*  --    BANDS PCT %  --   --   --   --   --   --  13*         Results from last 7 days   Lab Units 09/15/21  0520 09/14/21  0547 09/14/21  0012 09/13/21  1808 09/13/21  1009   SODIUM mmol/L 128* 128* 127* 127* 127*   POTASSIUM mmol/L 4 0 3 6 3 7 4 0 4 1   CHLORIDE mmol/L 91* 90* 90* 91* 89*   CO2 mmol/L 27 30 30 31 32   ANION GAP mmol/L 10 8 7 5 6   BUN mg/dL 58* 47* 48* 48* 48*   CREATININE mg/dL 2 30* 1 89* 1 93* 1 89* 2 06*   EGFR ml/min/1 73sq m 29 37 36 37 34   CALCIUM mg/dL 8 3 8 1* 8 0* 8 0* 8 0*   MAGNESIUM mg/dL 2 0 2 0  --   --   --      Results from last 7 days   Lab Units 09/15/21  0520 09/14/21  0547 09/14/21  0012 09/13/21  1009   AST U/L 13 19 20 22   ALT U/L 11* 11* 10* 14   ALK PHOS U/L 186* 196* 216* 224*   TOTAL PROTEIN g/dL 6 8 6 8 6 6 6 8   ALBUMIN g/dL 2 4* 2 2* 1 9* 1 8*   TOTAL BILIRUBIN mg/dL 1 63* 1 78* 1 50* 1 64*   BILIRUBIN DIRECT mg/dL 0 92* 1 09*  --   --      Results from last 7 days   Lab Units 09/14/21  1917 09/14/21  1253 09/14/21  0959 09/13/21  1807 09/13/21  1550   POC GLUCOSE mg/dl 195* 234* 216* 177* 122     Results from last 7 days   Lab Units 09/15/21  0520 09/14/21  0547 09/14/21  0012 09/13/21  1808 09/13/21  1009   GLUCOSE RANDOM mg/dL 150* 183* 174* 152* 144*       Results from last 7 days   Lab Units 09/13/21  1009   TROPONIN I ng/mL <0 02         Results from last 7 days   Lab Units 09/15/21  0520 09/14/21  0547 09/13/21  1808 09/13/21  1100   PROTIME seconds 28 3* 29 6* 27 9* 28 6*   INR  2 86* 3 03* 2 81* 2 90*   PTT seconds  --   --   --  82*         Results from last 7 days   Lab Units 09/14/21  0547 09/13/21  1100   PROCALCITONIN ng/ml 7 87* 3 89*     Results from last 7 days   Lab Units 09/13/21  1100   LACTIC ACID mmol/L 1 7       Results from last 7 days   Lab Units 09/13/21  1009   LIPASE u/L 163             Results from last 7 days   Lab Units 09/13/21  1259   CLARITY UA  Clear   COLOR UA  Yellow   SPEC GRAV UA  <=1 005   PH UA  6 0   GLUCOSE UA mg/dl Negative   KETONES UA mg/dl Negative   BLOOD UA  Negative   PROTEIN UA mg/dl Negative   NITRITE UA  Negative   BILIRUBIN UA  Negative   UROBILINOGEN UA E U /dl 1 0   LEUKOCYTES UA  Negative       Results from last 7 days   Lab Units 09/13/21  1100   BLOOD CULTURE  No Growth at 24 hrs  No Growth at 24 hrs  ED Treatment:   Medication Administration from 09/13/2021 0945 to 09/13/2021 1534       Date/Time Order Dose Route Action     09/13/2021 1018 diltiazem (CARDIZEM) injection 15 mg 15 mg Intravenous Given     09/13/2021 1113 cefepime (MAXIPIME) IVPB (premix in dextrose) 1,000 mg 50 mL 1,000 mg Intravenous New Bag     09/13/2021 1112 lactated ringers bolus 1,000 mL 1,000 mL Intravenous New Bag     09/13/2021 1138 lactated ringers bolus 1,000 mL 1,000 mL Intravenous New Bag     09/13/2021 1148 lactated ringers bolus 1,000 mL 1,000 mL Intravenous New Bag     09/13/2021 1256 vancomycin (VANCOCIN) 2,000 mg in sodium chloride 0 9 % 500 mL IVPB 2,000 mg Intravenous New Bag     09/13/2021 1257 lactated ringers bolus 1,000 mL 1,000 mL Intravenous New Bag        Past Medical History:   Diagnosis Date    A-fib ([place] [address] )     Acute renal failure superimposed on stage 3b chronic kidney disease ([facility] ) 03/15/2021    Cardiac disease     Chronic combined systolic (congestive) and diastolic (congestive) heart failure (HCC)     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 ascites ([facility] )     Class 3 severe obesity due to excess calories with serious comorbidity and body mass index (BMI) of 45 0 to 49 9 in adult ([facility] ) 10/17/2018    Diabetes mellitus ([facility] )     Dilated cardiomyopathy ([facility] )     History of echocardiogram 11/24/2014    EF 0 30 (30%), Likely mod LV systolic dysfunction  Likely RV dysfunction as well   History of Lexiscan MPI 02/19/2016    EF 0 43 (43%), no prior MI or ischemia   Hx of echocardiogram 04/28/2017    Normal EF, Normal LV systolic function  Mild concentric LV hypertrophy  Mild mitral and tricuspid regurg      Hx: recurrent pneumonia     Hypertension     Hypokalemia 09/04/2020    Long term (current) use of anticoagulants     Morbid (severe) obesity due to excess calories (HCC)     Neuropathy     Pleural effusion on right     Stage 3 chronic kidney disease ([facility] )      Present on Admission:   Pleural effusion, right   STEFF (obstructive sleep apnea)   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 ascites (HCC)   Chronic anemia   Hyponatremia   Type 2 diabetes mellitus with diabetic chronic kidney disease (HCC)   Chronic venous insufficiency   Hypoalbuminemia      Admitting Diagnosis: Panniculitis [M79 3]  Abdominal pain [R10 9]  Renal insufficiency [N28 9]  Septic shock (HCC) [A41 9, R65 21]  Age/Sex: 57 y o  male  Admission Orders:  Scheduled Medications:  albumin human, 25 g, Intravenous, Q6H  cefepime, 2,000 mg, Intravenous, Q12H  metoprolol tartrate, 25 mg, Oral, Q12H GERRY, increase to 37 5mg  nicotine, 1 patch, Transdermal, Daily  pantoprazole, 40 mg, Oral, Early Morning  vancomycin, 15 mg/kg (Adjusted), Intravenous, Q12H    PO kdur x 1 9/14    Continuous IV Infusions:  diltiazem, 1-15 mg/hr, Intravenous, Titrated  norepinephrine, 1-30 mcg/min, Intravenous, Titrated  sodium chloride 0 9 % infusion   Rate: 75 mL/hr Dose: 75 mL/hr  Freq: Continuous Route: IV  Last Dose: Stopped (09/14/21 1728)  Start: 09/13/21 1715 End: 09/14/21 1714    PRN Meds:  PO tylenol x 1 9/14, x 1 9/15  HYDROmorphone, 0 5 mg, Intravenous, Q6H PRN  ondansetron, 4 mg, Intravenous, Q6H PRN  polyethylene glycol, 17 g, Oral, Daily PRN    scd  Tele  Transfuse 1u prbc's  Cardiac diet, 1000 ml fluid restriction    IP CONSULT TO PULMONOLOGY  IP CONSULT TO CASE MANAGEMENT  IP CONSULT TO PHARMACY  INPATIENT CONSULT TO IR  IP CONSULT TO NEPHROLOGY    Network Utilization Review Department  ATTENTION: Please call with any questions or concerns to [phone] and carefully listen to the prompts so that you are directed to the right person  All voicemails are confidential   Mara Barone all requests for admission clinical reviews, approved or denied determinations and any other requests to dedicated fax number below belonging to the campus where the patient is receiving treatment   List of dedicated fax numbers for the Facilities:  [address] DENIALS (Administrative/Medical Necessity) [phone]   [address]  (Maternity/NICU/Pediatrics) 261 [facility] [address] [place] [address] Dr Segun Watson [id number] [place] [address] [address] [address] [facility] [address] [phone]

## 2021-09-14 NOTE — PROGRESS NOTES
[address] [facility] [address] [place]  Progress Note - Beau Clemens 1960, 57 y o  male MRN: [id number]  Unit/Bed#:  Encounter: [id number]  Primary Care Provider: Carmela Crawford DO   Date and time admitted to hospital: 9/13/2021  9:51 AM    * Septic shock [facility])  Assessment & Plan  Background:  Presented to the ED with lower abdominal pain over the past 1 day     Criteria met on admission:  Tachycardia, tachypnea, hypotension, leukocytosis and fever  o With associated organ dysfunction as evidence by creatinine elevation  o Thought to be in the setting of panniculitis   CT abdomen and pelvis "Disproportionate dermal thickening and subcutaneous edema in the ventral lower pannus without encapsulated collections, soft tissue gas, or inflammatory changes extending into the perineum "   WBC of 18 4; trend as noted below   IV Fluids per sepsis protocol; continuous overnight   IV antibiotics with cefepime and vancomycin  o Continue broad-spectrum and follow up with cultures and deescalate as appropriate   Vasopressor with Levophed  o CVL placed in right IJ   Continuous cardiopulmonary monitoring   Blood cultures pending   UA unremarkable   Procalcitonin elevated 3 89; continue to trend    Admitted to critical care service; awaiting evaluation   Daily weight   Intake and output   Serial lab monitoring   Pharmacy consulted for assist with vancomycin dosing    Lab Results   Component Value Date    WBC 17 97 (H) 09/14/2021    WBC 18 40 (H) 09/13/2021    WBC 6 47 08/03/2021         Panniculitis  Assessment & Plan  · Involving the lower abdominal area  · See plan under primary problem     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 ascites ([facility] [place] [address] )  Assessment & Plan  · Known past medical history secondary to alcoholic and nonalcoholic fatty liver disease  · Follows outpatient with Gastroenterology; last seen on 08/04/2021  · Not a candidate for liver transplant given BMI and comorbidities  · Avoid hepatotoxic medications  · Serial lab monitoring    Chronic anemia  Assessment & Plan  · Known past medical history in the setting of CKD and cirrhosis  · Baseline hemoglobin appears to be closer to 7-8  · Known past medical history of colonic AVMs requiring APC  · Recommend transfusion for less than 7  · FOBT ordered  · Iron panel ordered  · 2 units PRBC ordered to prepare on 09/14  · 1 unit PRBC ordered to transfuse on 09/14  · Hold Coumadin given therapeutic INR and downtrending hemoglobin  · Serial hemoglobin monitoring  · Pending iron panel would consider gastroenterology evaluation as on last visit if continued to down trend may require repeat colonoscopy +/- push enteroscopy    Lab Results   Component Value Date    HGB 6 4 (LL) 09/14/2021    HGB 6 6 (LL) 09/14/2021    HGB 7 4 (L) 09/13/2021         Pleural effusion, right  Assessment & Plan  · Known past medical history with history of requiring thoracenteses within the past year  · Secondary to right lung hematomas  · Follows OP with thoracic surgery; last evaluation on 9/2/2021  · Status post VATS procedure  · CT Chest 09/13/2021: "Large chronic right pleural effusion with probable partial loculation at the anterolateral base  Unchanged round atelectasis of the adjacent basilar right middle and lower lobes    No acute consolidations"  · Was scheduled for OP thoracentesis on 9/14/2021 however presented to ED on 9/13/2021  · IR consulted; awaiting evaluation     Acute kidney injury superimposed on chronic kidney disease [facility])  Assessment & Plan  Lab Results   Component Value Date    EGFR 37 09/13/2021    EGFR 34 09/13/2021    EGFR 44 06/29/2021    CREATININE 1 89 (H) 09/13/2021    CREATININE 2 06 (H) 09/13/2021    CREATININE 1 66 (H) 06/29/2021   · Improving with IVF; Present on admission as evidence by creatinine of 2 06  · Superimposed on CKD stage 3  · Baseline appears to be approximately 1 31 6 on review of record  · Follows outpatient with Nephrology Dr Hanna Obrien · In the setting of hypotension/infection  · Urinalysis unremarkable  · 4 L of IV fluids were given in the ED; continues IV fluids for now  · Avoid nephrotoxins, NSAIDs, and hypotension of possible  · Renally dose medications as appropriate  · Monitor with morning labs; trend as above    Atrial fibrillation ([facility] )  Assessment & Plan  · With known permanent atrial fibrillation; presented with RVR  · Rate controlled on metoprolol  · Anticoagulated on Coumadin  · Holding metoprolol and initiated Cardizem gtt  · Goal INR 2-3  · Cardiopulmonary monitoring  · Daily INR    Lab Results   Component Value Date    INR 2 81 (H) 09/13/2021    INR 2 90 (H) 09/13/2021    INR 2 1 08/23/2021         Hyponatremia  Assessment & Plan  · Stable; Present on admission as evidence by sodium 127 upon presentation  · Appears to be somewhat chronic on review of records with recent baseline of 130-135  · In the setting of acute infection as well as known liver dysfunction  · Status post 4 L of IV LR  · Continuous IVF with NS for now  · Fluid restriction for now  · Serial lab monitoring     Lab Results   Component Value Date    SODIUM 127 (L) 09/14/2021    SODIUM 127 (L) 09/13/2021         Chronic right-sided congestive heart failure ([facility] )  Assessment & Plan  · Known past medical history  · Follows OP with Cardiology physician Dr Chavez  · Managed as an outpatient on 80 mg Lasix BID with K supplementation  · Echo from 02/22/2021 with ejection fraction of 60%     · Noted severe lower extremity edema likely in the setting of hypoalbuminemia as well as chronic venous insufficiency  · CT imaging with mild pulmonary interstitial edema without alveolar consolidations  · IV diuretics on hold in the setting of shock  · Daily weight  · Intake and output  · Cardiac/diabetic/2 g sodium/fluid restriction diet  · Given need for IV hydration in the setting of sepsis monitor volume status very closely        Chronic diastolic (congestive) heart failure [facility])  Assessment & Plan  Wt Readings from Last 3 Encounters:   09/13/21 (!) 152 kg (335 lb 12 2 oz)   08/04/21 (!) 138 kg (305 lb)   07/07/21 (!) 139 kg (306 lb)     This is a chronic condition  Patient follows up with Cardiology associates (Dr wiley)  Patient euvolemic on exam  Monitor I/os and daily weight  Telemetry and vital signs  Am labs  Type 2 diabetes mellitus with diabetic chronic kidney disease [facility])  Assessment & Plan  Lab Results   Component Value Date    HGBA1C 5 5 05/21/2021     Recent Labs     09/13/21  1550 09/13/21  1807   POCGLU 122 177*     · Does not appear to be in any glycemic agents as an outpatient  · A1c reveals control as above  · Carb controlled diet for now  · May require SSI in the setting of acute infection at some point  · Monitor with morning labs    Chronic venous insufficiency  Assessment & Plan  · Known past medical history  · Continue plan as above  · Compression stockings when appropriate    STEFF (obstructive sleep apnea)  Assessment & Plan  · Known past medical history with CPAP compliance at home  · Continue CPAP HS per home regimen/settings     Hypoalbuminemia  Assessment & Plan  · Present on admission as evidence by albumin of 1 8  · In the setting of alcoholic liver cirrhosis without ascites  · Continue albumin infusions to further assist with edema and blood pressure support  · Monitor values with CMP        VTE Pharmacologic Prophylaxis: VTE Score: 7 High Risk (Score >/= 5) - Pharmacological DVT Prophylaxis Contraindicated  Sequential Compression Devices Ordered  Patient Centered Rounds: I performed bedside rounds with nursing staff today  Discussions with Specialists or Other Care Team Provider: nursing staff    Education and Discussions with Family / Patient: Patient declined call to   Time Spent for Care: 30 minutes  More than 50% of total time spent on counseling and coordination of care as described above      Current Length of Stay: 1 day(s)  Current Patient Status: Inpatient   Certification Statement: The patient will continue to require additional inpatient hospital stay due to IV vasopressors, IV cardizem, IV abx, and blood transfusion  Discharge Plan: Anticipate discharge in >72 hrs to likely to rehab vs  home with VNA     Code Status: Level 1 - Full Code    Subjective:   Patient states "I felt better  "Continues to report abdominal pain however denies any heart palpitations, dizziness, lightheadedness, or shortness of breath at rest     Objective:     Vitals:   Temp (24hrs), Av 1 °F (37 3 °C), Min:97 3 °F (36 3 °C), Max:101 9 °F (38 8 °C)    Temp:  [97 3 °F (36 3 °C)-101 9 °F (38 8 °C)] 97 3 °F (36 3 °C)  HR:  [105-154] 114  Resp:  [10-42] 36  BP: ()/(35-69) 90/51  SpO2:  [90 %-100 %] 95 %  Body mass index is 45 54 kg/m²  Input and Output Summary (last 24 hours): Intake/Output Summary (Last 24 hours) at 2021 0334  Last data filed at 2021 0103  Gross per 24 hour   Intake 3850 ml   Output 450 ml   Net 3400 ml       Physical Exam:   Physical Exam  Constitutional:       Appearance: He is obese  He is not ill-appearing or diaphoretic  Cardiovascular:      Rate and Rhythm: Tachycardia present  Rhythm regularly irregular  Pulses: Normal pulses  Radial pulses are 2+ on the right side and 2+ on the left side  Heart sounds: No murmur heard  Pulmonary:      Effort: Pulmonary effort is normal  No respiratory distress  Breath sounds: No rhonchi or rales  Abdominal:      General: Bowel sounds are normal       Palpations: Abdomen is soft  Tenderness: There is abdominal tenderness  Musculoskeletal:         General: Swelling present  Right lower leg: Edema present  Left lower leg: Edema present  Skin:     General: Skin is warm and dry  Capillary Refill: Capillary refill takes 2 to 3 seconds     Neurological:      Mental Status: He is alert and oriented to person, place, and time  Psychiatric:         Mood and Affect: Mood normal          Behavior: Behavior normal  Behavior is cooperative  Judgment: Judgment normal           Additional Data:     Labs:  Results from last 7 days   Lab Units 09/14/21  0224 09/14/21  0012 09/13/21  1009   WBC Thousand/uL  --  17 97* 18 40*   HEMOGLOBIN g/dL 6 4* 6 6* 7 4*   HEMATOCRIT % 21 1* 21 5* 23 9*   PLATELETS Thousands/uL  --  271 294   BANDS PCT %  --   --  13*   NEUTROS PCT %  --  92*  --    LYMPHS PCT %  --  4*  --    LYMPHO PCT %  --   --  4*   MONOS PCT %  --  3*  --    MONO PCT %  --   --  1*   EOS PCT %  --  0 0     Results from last 7 days   Lab Units 09/14/21  0012   SODIUM mmol/L 127*   POTASSIUM mmol/L 3 7   CHLORIDE mmol/L 90*   CO2 mmol/L 30   BUN mg/dL 48*   CREATININE mg/dL 1 93*   ANION GAP mmol/L 7   CALCIUM mg/dL 8 0*   ALBUMIN g/dL 1 9*   TOTAL BILIRUBIN mg/dL 1 50*   ALK PHOS U/L 216*   ALT U/L 10*   AST U/L 20   GLUCOSE RANDOM mg/dL 174*     Results from last 7 days   Lab Units 09/13/21  1808   INR  2 81*     Results from last 7 days   Lab Units 09/13/21  1807 09/13/21  1550   POC GLUCOSE mg/dl 177* 122         Results from last 7 days   Lab Units 09/13/21  1100   LACTIC ACID mmol/L 1 7   PROCALCITONIN ng/ml 3 89*       Lines/Drains:  Invasive Devices     Central Venous Catheter Line            CVC Central Lines 09/14/21 Triple 20cm <1 day          Peripheral Intravenous Line            Peripheral IV 09/13/21 Right Antecubital <1 day    Peripheral IV 09/13/21 Right Wrist <1 day                Central Line:  Goal for removal: Will discontinue when hemodynamically stable  Imaging: Reviewed radiology reports from this admission including: chest xray, chest CT scan and abdominal/pelvic CT    Recent Cultures (last 7 days):   Results from last 7 days   Lab Units 09/13/21  1100   BLOOD CULTURE  Received in Microbiology Lab  Culture in Progress  Received in Microbiology Lab  Culture in Progress         Last 24 Hours Medication List:   Current Facility-Administered Medications   Medication Dose Route Frequency Provider Last Rate    acetaminophen  650 mg Oral Q6H PRN Steffen Cordova MD      albumin human  25 g Intravenous Q6H TRES Mejias      cefepime  2,000 mg Intravenous Q12H Steffen Cordova MD 2,000 mg (09/13/21 2226)    diltiazem  1-15 mg/hr Intravenous Titrated Steffen Cordova MD 15 mg/hr (09/14/21 0103)    nicotine  1 patch Transdermal Daily Steffen Cordova MD      norepinephrine  1-30 mcg/min Intravenous Titrated Steffen Cordova MD 5 mcg/min (09/13/21 2302)    ondansetron  4 mg Intravenous Q6H PRN Steffen Cordova MD      pantoprazole  40 mg Oral Early Morning Steffen Cordova MD      polyethylene glycol  17 g Oral Daily PRN Steffen Cordova MD      sodium chloride  125 mL/hr Intravenous Continuous Steffen Cordova  mL/hr (09/13/21 1734)    vancomycin  15 mg/kg (Adjusted) Intravenous Q12H Steffen Cordova MD 1,500 mg (09/13/21 4325)        Today, Patient Was Seen By: TRES Walker    **Please Note: This note may have been constructed using a voice recognition system  **

## 2021-09-14 NOTE — CASE MANAGEMENT
Case Management Assessment    Patient name Lu Roldan  Location / MRN [id number]  : 1960 Date 2021       Current Admission Date: 2021  Current Admission Diagnosis:  Septic shock ([address] )  Previous Admission - Discharge Date:21   LOS (days): 1  Geometric Mean LOS (GMLOS) (days): 4 80  Days to GMLOS:3 8 Previous Discharge Diagnosis:  There are no discharge diagnoses documented for the most recent discharge  OBJECTIVE:    Risk of Unplanned Readmission Score: 30   Bundle(if applicable):    Current admission status: Inpatient       Preferred Pharmacy:   [id number] [address], [address]  [address] [facility] [address]  Phone: [phone] Fax: [phone]    Primary Care Provider: Kelvin Mcdonough DO    Primary Insurance: [facility]  Secondary Insurance:     ASSESSMENT:  [address] [employer] Agents    There are no active Health Care Agents on file  Ameena Han [address] of Residence: [address] [place] [address]    Readmission Root Cause  30 Day Readmission: No    Patient Information  Mental Status: Alert  During Assessment patient was accompanied by: Not accompanied during assessment  Assessment information provided by[de-identified] Patient  Primary Caregiver: Self  Support Systems: Self  Home entry access options   Select all that apply : Stairs  Number of steps to enter home : 4  Do the steps have railings?: Yes  Type of Current Residence: [address] [place] [address] home  Upon entering residence, is there a bedroom on the main floor (no further steps)?: No  A bedroom is located on the following floor levels of residence (select all that apply):: 2nd [address] [facility] which floors of current residence have a bathroom (select all the apply):: Basement, 2nd Floor  Number of steps to 2nd floor from main floor: One Flight  Living Arrangements: Lives w/ Spouse/significant other    Activities of Daily Living Prior to Admission  Functional Status: Independent  Completes ADLs independently?: Yes  Ambulates independently?: Yes  Does patient use assisted devices?: Yes  Assisted Devices (DME) used: Straight Cane  Does patient currently own DME?: Yes  What DME does the patient currently own?: Straight Cane  Does patient have a history of Outpatient Therapy (PT/OT)?: No  Does the patient have a history of Short-Term Rehab?: No  Does patient currently have [facility]?: No         Patient Information Continued  Does patient have prescription coverage?: Yes  Does patient receive dialysis treatments?: No  Does patient have a history of Mental Health Diagnosis?: No         Means of Transportation  Means of Transport to Appts[de-identified] Drives Self (Pt and his wife both drive  Pt 's wife will give the patient a ride home )  In the past 12 months, has lack of transportation kept you from medical appointments or from getting medications?: No  In the past 12 months, has lack of transportation kept you from meetings, work, or from getting things needed for daily living?: No  I will continue to follow for any Case Management needs

## 2021-09-15 NOTE — PLAN OF CARE
Problem: PHYSICAL THERAPY ADULT  Goal: Performs mobility at highest level of function for planned discharge setting  See evaluation for individualized goals  Description: Treatment/Interventions: Functional transfer training, LE strengthening/ROM, Elevations, Therapeutic exercise, Endurance training, Bed mobility, Gait training          See flowsheet documentation for full assessment, interventions and recommendations  Note: Prognosis: Good  Problem List: Decreased strength, Decreased endurance, Impaired balance, Decreased mobility, Pain, Obesity  Assessment: Patient is a 57 y o  male evaluated by Physical Therapy s/p admit to [address] [place] [address] on 9/13/2021 with admitting diagnosis of: Panniculitis, Abdominal pain, Renal insufficiency, Septic shock, and principal problem of: Septic shock  PT was consulted to assess patient's functional mobility and discharge needs  Ordered are PT Evaluation and treatment with activity level of: up and OOB as tolerated  Comorbidities affecting patient's physical performance at time of assessment include: a-fib, DM, neuropathy, HTN, CHF, CKD, cirrhosis of liver  Personal factors affecting the patient at time of IE include: lives in 2 story home, ambulating with assistive device, step(s) to enter home, inability to navigate community distances, history of fall(s), inability/difficulty performing IADLs and inability/difficulty performing ADLs  Please locate objective findings from PT assessment regarding body systems outlined above  Upon evaluation, pt able to perform all functional mobility with SUP, SPC, and increased time  Occasional verbal cuing provided for direction  Pt becoming increasingly SOB with exertion and did require seated rest break following 60' of ambulation  Despite symptoms, SpO2 and HR remained WFL on RA throughout  No true LOB experienced  The patient's AM-PAC Basic Mobility Inpatient Short Form Raw Score is 21, Standardized Score is 45 55   A standardized score greater than 42 9 suggests the patient may benefit from discharge to home  Please also refer to the recommendation of the Physical Therapist for safe discharge planning  Pt will benefit from continued PT intervention during LOS to address current deficits, increase LOF, and facilitate safe d/c to next level of care when medically appropriate  D/c recommendation at this time is home with outpatient rehabilitation  Co treatment with OT secondary to complex medical condition of pt, possible A of 2 required to achieve and maintain transitional movements, requiring the need of skilled therapeutic intervention of 2 therapists to achieve delivery of services  PT Discharge Recommendation: Home with outpatient rehabilitation          See flowsheet documentation for full assessment

## 2021-09-15 NOTE — CONSULTS
Consultation - Nephrology   Desirae Ramsay 57 y o  male MRN: [id number]  Unit/Bed#:  Encounter: [id number]      Assessment and Plan    1  Acute kidney injury, present admission, superimposed on chronic kidney disease  · Patient was initially significantly volume resuscitated due to septic shock  He has been weaned off pressors and transitioned to midodrine 10 mg 3 times daily, octreotide 50 mcg every 8 hours and albumin 25 g every 8 hours per pulmonology team   Primary is providing furosemide 60 mg twice daily due to volume overload  · Will request urine studies to assist with determination of etiology  This occurred in the setting of septic shock and hemodynamic perturbations  There also significant volume overload  Will consider hepatorenal syndrome as well, for which pulmonology has started treatment  Pending results of workup, will pursue likely diuresis for cardiorenal etiology versus triple therapy for hepatorenal syndrome  Certainly will recommend avoidance of IV contrast, nephrotoxins, NSAIDs  Trend renal function  Continue to provide supportive care  Renally dose medications  Optimize hemodynamics  Maintain mean arterial pressure greater than 60 mmHg  Periodically monitor for urinary retention with strict I&Os and bladder scan  2  Chronic kidney disease, stage 3 with estimated GFR 1 2 mg/dL, most recently 1 66 mg/dL in outpatient setting on 06/29/2021  · Nephrologist is Dr Phil Gant  3  Hyponatremia, chronic, suspect hypervolemic  · Will request urine studies to assist with determination of etiology  Suspect volume overload  Will follow with 2 g sodium restriction and 1 L fluid restriction  4  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 ascites  · Has not required paracentesis  Follows with Gastroenterology  Reportedly not a candidate for transplant due to BMI and comorbidities  Defer management to Primary colleagues      5  Lower extremity edema  · On home furosemide 80 mg twice daily with potassium chloride 20 mEq twice daily  · Will follow with furosemide 60 mg IV b i d  per primary  Check urinary response tomorrow  6  Chronic right-sided congestive heart failure  · LVEF was 60% on echocardiogram performed 02/22/2021  Following significant volume resuscitation due to septic shock  Strict I&Os  Standing scale daily weights  2 g sodium restriction  1 L Fluid restriction  Will follow with diuresis as in #5 and adjust accordingly  7  Hypoalbuminemia  · In the setting of chronic cirrhosis and acute septic shock  Receiving albumin per pulmonology now  8  Panniculitis  · On cefepime and vancomycin per Primary colleagues  9  Septic shock  · Weaned off pressors  Transitioned to midodrine 10 mg 3 times daily today  Maintain mean arterial pressure above 60 mmHg  10  Atrial fibrillation  · With re-initiation of rate control with metoprolol tartrate 37 5 mg every 12 hours, per primary  Continue management per Primary colleagues  11  Pleural effusion, right  · Noted  Follow with trial of diuresis  Thank you for allowing us to participate in the care of your patient  Please feel free to contact us regarding the care of this patient, or any other questions/concerns that may be applicable      Patient Active Problem List   Diagnosis    Chronic diastolic (congestive) heart failure (HCC)    Type 2 diabetes mellitus with diabetic chronic kidney disease (HCC)    Lymphedema    Chronic venous insufficiency    Varicose veins of both lower extremities with inflammation    Hypertensive heart and chronic kidney disease with heart failure and stage 1 through stage 4 chronic kidney disease, or chronic kidney disease (HCC)    STEFF (obstructive sleep apnea)    Pleural effusion, right    SOB (shortness of breath)    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 ascites (HCC)    Tobacco abuse    Chronic anemia    Thrombocytopenia (HCC)    Other cirrhosis of liver ([facility] )    Encounter for screening for other viral diseases     Lactic acidosis    Atrial fibrillation (HCC)    Diverticulosis of colon    Lesion of spleen    Type 2 diabetes mellitus with hyperglycemia, without long-term current use of insulin (HCC)    Chronic diastolic CHF (congestive heart failure) (HCC)    Tobacco use    Hyponatremia    Constipation    Pulmonary hypertension (HCC)    Vitamin D deficiency    Atelectasis    Anemia    Hypercalcemia    Acute kidney injury superimposed on chronic kidney disease (HCC)    Hypertensive nephrosclerosis    Chronic kidney disease-mineral and bone disorder    Hypotension    Right wrist pain    Morbid (severe) obesity due to excess calories (HCC)    Smoking    Iron deficiency    Chronic right-sided congestive heart failure (HCC)    Septic shock (HCC)    Panniculitis    Hypoalbuminemia       History of Present Illness     Physician Requesting Consult: Teresia Dakins, MD    Reason for Consult / Principal Problem:  Acute kidney injury / Volume overload    Hx and PE limited by: none    HPI: Jigar Roth is a 57y o  year old male with hypertension, diabetes, chronic kidney disease stage 3, cirrhosis, congestive heart failure who presented to [facility] 's emergency department on 11/13/2021 with pain and swelling of lower abdomen pannus  He was admitted for treatment of panniculitis  Intake labs revealed hyponatremia with serum sodium 127 millimole per L, acute kidney injury with BUN 40 mg/dL with creatinine 2 06 mg/dL estimated GFR 34 mL/min, hypoalbuminemia with albumin 1 8 grams/deciliter  Nephrology is consulted for management acute kidney injury and volume overload  Please see above for discussion of prior treatment  Upon speaking with the patient, he complains of only pain and swelling of his abdominal pannus  He follows with Dr Virgilio Vanegas for management of chronic kidney disease and follows with gastroenterology for management of cirrhosis    He denies any history of ascites necessitating paracentesis  He avoids both acetaminophen and NSAIDs  He takes occasional aspirin for pain  He takes home diuretic furosemide 80 mg twice daily  He reports that he doubled up" in the days leading up to admission due to worsening lower extremity edema  He does admit to increased water intake because this seemed to help with his abdominal pain symptoms  He is aware that he should keep to a fluid restriction and sodium restriction  He does try to adhere to sodium restriction  He explains that he will choose baked chips instead of regular chips due to lower sodium content, but he certainly does not avoid all added sodium  He has a blood pressure 118/68 with a heart rate of 78 with oxygen saturation 96% and 20 respirations per minute at the time of our interview  His wife is at bedside  History obtained from chart review and the patient  Review of Systems    Constitutional ROS- Denies fatigue, fever, chills, night sweats, weight changes  HEENT ROS- Denies history of eye surgeries, glaucoma, headaches or history of trauma, blurred vision, cataracts or dental disease  Endocrine ROS- No history of thyroid disease  History of diabetes  Cardiovascular ROS- denies chest pain, palpitation, dyspnea exertion, orthopnea, claudication  Pulmonary ROS- Denies history of COPD, asthma  Positive for cough, patient reports secondary to something he just ate  Denies hemoptysis, shortness of breath  GI ROS- Denies abdominal pain, diarrhea, nausea, swallowing problems, vomiting, constipation, blood in stools, fecal incontinence  History of cirrhosis  Patient still has gallbladder and appendix  Hematological ROS- Denies history of easy bruising, blood clots, bleeding or blood transfusions  Anticoagulated    Genitourinary ROS- Denies recent hematuria, pyuria, flank pain, change in urinary stream, decreased urinary output, increased urinary frequency, nocturia, foamy urine, or urinary incontinence  Lymphatic ROS- Denies lymphadenopathy  Musculoskeletal ROS- Denies history of gout, muscle weakness, joint pain  Dermatological ROS- Denies rash, wounds, ulcers, itching, jaundice  Severe pain of pannus  Psychiatric ROS- Denies anxiety, depression, hallucinations, disorientation  Neurological ROS- No stroke or TIA symptoms  Denies dizziness, paresthesias, history of stroke, history of peripheral neuropathy  Historical Information   Past Medical History:   Diagnosis Date    A-fib [facility])     Acute renal failure superimposed on stage 3b chronic kidney disease ([facility] ) 03/15/2021    Cardiac disease     Chronic combined systolic (congestive) and diastolic (congestive) heart failure (HCC)     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 ascites (HCC)     Class 3 severe obesity due to excess calories with serious comorbidity and body mass index (BMI) of 45 0 to 49 9 in adult [facility]) 10/17/2018    Diabetes mellitus ([place] [address] )     Dilated cardiomyopathy ([place] [address] )     History of echocardiogram 11/24/2014    EF 0 30 (30%), Likely mod LV systolic dysfunction  Likely RV dysfunction as well   History of Lexiscan MPI 02/19/2016    EF 0 43 (43%), no prior MI or ischemia   Hx of echocardiogram 04/28/2017    Normal EF, Normal LV systolic function  Mild concentric LV hypertrophy  Mild mitral and tricuspid regurg      Hx: recurrent pneumonia     Hypertension     Hypokalemia 09/04/2020    Long term (current) use of anticoagulants     Morbid (severe) obesity due to excess calories (HCC)     Neuropathy     Pleural effusion on right     Stage 3 chronic kidney disease [facility])      Past Surgical History:   Procedure Laterality Date    HERNIA REPAIR      IR CHEST TUBE CHECK/CHANGE/REPOSITION/UPSIZE  5/27/2021    IR CHEST TUBE PLACEMENT  5/22/2021    IR THORACENTESIS  8/25/2020    IR THORACENTESIS  2/23/2021    LUNG DECORTICATION Right 6/2/2021    Procedure: DECORTICATION LUNG;  Surgeon: Danya Reddy MD;  Location: [facility];  Service: Thoracic    SPLENECTOMY, TOTAL      THORACOSCOPY VIDEO ASSISTED SURGERY (VATS) Right 6/2/2021    Procedure: THORACOSCOPY VIDEO ASSISTED SURGERY (VATS); Surgeon: Amanda Cantu MD;  [facility]: [facility];  Service: Thoracic    VASCULAR SURGERY Right     leg     Social History   Social History     Substance and Sexual Activity   Alcohol Use Yes    Comment: 4 or 5 drinks weekly/bloody chani's     Social History     Substance and Sexual Activity   Drug Use Never     Social History     Tobacco Use   Smoking Status Current Some Day Smoker    Packs/day: 0 25    Types: Cigarettes   Smokeless Tobacco Never Used   Tobacco Comment    smokes 1 pack per week and a half      History reviewed  No pertinent family history      Meds/Allergies   all current active meds have been reviewed, current meds:   Current Facility-Administered Medications   Medication Dose Route Frequency    albumin human (FLEXBUMIN) 25 % injection 25 g  25 g Intravenous Q8H    cefepime (MAXIPIME) IVPB (premix in dextrose) 2,000 mg 50 mL  2,000 mg Intravenous Q12H    diltiazem (CARDIZEM) 125 mg in sodium chloride 0 9 % 125 mL infusion  1-15 mg/hr Intravenous Titrated    furosemide (LASIX) injection 60 mg  60 mg Intravenous BID (diuretic)    HYDROmorphone (DILAUDID) injection 0 5 mg  0 5 mg Intravenous Q6H PRN    metoprolol tartrate (LOPRESSOR) partial tablet 37 5 mg  37 5 mg Oral Q12H [address]    midodrine (PROAMATINE) tablet 10 mg  10 mg Oral TID AC    nicotine (NICODERM CQ) 14 mg/24hr TD 24 hr patch 1 patch  1 patch Transdermal Daily    norepinephrine (LEVOPHED) 4 mg (STANDARD CONCENTRATION) IV in sodium chloride 0 9% 250 mL  1-30 mcg/min Intravenous Titrated    octreotide (SandoSTATIN) injection 50 mcg  50 mcg Subcutaneous Q8H [address]    ondansetron (ZOFRAN) injection 4 mg  4 mg Intravenous Q6H PRN    pantoprazole (PROTONIX) EC tablet 40 mg  40 mg Oral Early Morning    polyethylene glycol (MIRALAX) packet 17 g  17 g Oral Daily PRN    vancomycin (VANCOCIN) 1,500 mg in sodium chloride 0 9 % 500 mL IVPB  15 mg/kg (Adjusted) Intravenous Q12H    and PTA meds:    Medications Prior to Admission   Medication    furosemide (LASIX) 80 mg tablet    metoprolol succinate (TOPROL-XL) 25 mg 24 hr tablet    polyethylene glycol (MIRALAX) 17 g packet    potassium chloride (K-DUR,KLOR-CON) 20 mEq tablet    warfarin (COUMADIN) 5 mg tablet    glucose blood test strip    Lancets (freestyle) lancets    warfarin (COUMADIN) 5 mg tablet         No Known Allergies    Objective     Intake/Output Summary (Last 24 hours) at 9/15/2021 1211  Last data filed at 9/15/2021 1003  Gross per 24 hour   Intake 2656 44 ml   Output --   Net 2656 44 ml       Invasive Devices:        Physical Exam  Vitals and nursing note reviewed  Constitutional:       General: He is awake  He is not in acute distress  Appearance: Normal appearance  He is well-developed  He is morbidly obese  He is ill-appearing (chronically)  He is not toxic-appearing or diaphoretic  HENT:      Head: Normocephalic and atraumatic  Jaw: There is normal jaw occlusion  Nose: Nose normal       Mouth/Throat:      Mouth: Mucous membranes are moist       Pharynx: Oropharynx is clear  No oropharyngeal exudate or posterior oropharyngeal erythema  Eyes:      General: Lids are normal  Vision grossly intact  Gaze aligned appropriately  No scleral icterus  Right eye: No discharge  Left eye: No discharge  Extraocular Movements: Extraocular movements intact  Conjunctiva/sclera: Conjunctivae normal       Pupils: Pupils are equal, round, and reactive to light  Neck:      Thyroid: No thyroid mass or thyromegaly  Trachea: Trachea and phonation normal    Cardiovascular:      Rate and Rhythm: Tachycardia present  Rhythm regularly irregular  Heart sounds: Normal heart sounds, S1 normal and S2 normal  No murmur heard  No friction rub  No gallop      Pulmonary: Effort: Pulmonary effort is normal  No respiratory distress  Breath sounds: No stridor  Decreased breath sounds present  No wheezing, rhonchi or rales  Abdominal:      General: Abdomen is protuberant  Bowel sounds are normal  There is no distension  Palpations: Abdomen is soft  There is no mass  Tenderness: There is abdominal tenderness  There is no guarding  Hernia: No hernia is present  Musculoskeletal:         General: Normal range of motion  Cervical back: Normal range of motion and neck supple  No rigidity or tenderness  Right lower leg: 3+ Pitting Edema present  Left lower leg: 3+ Pitting Edema present  Lymphadenopathy:      Cervical: No cervical adenopathy  Skin:     General: Skin is warm and dry  Coloration: Skin is not jaundiced  Findings: No bruising  Nails: There is no clubbing  Comments: Bilateral lower extremity skin color and textural changes consistent with chronic edema  Neurological:      General: No focal deficit present  Mental Status: He is alert and oriented to person, place, and time  Mental status is at baseline  Psychiatric:         Attention and Perception: Attention and perception normal          Mood and Affect: Mood and affect normal          Speech: Speech normal          Behavior: Behavior normal  Behavior is cooperative  Thought Content: Thought content normal          Judgment: Judgment normal            I/O last 3 completed shifts: In: 5782 5 [P O :1320; I V :2412 5; Blood:700; IV Piggyback:1350]  Out: 200 [Urine:200]    Vitals:    09/15/21 1130   BP: 118/59   Pulse: 76   Resp: 18   Temp:    SpO2: 96%         Current Weight: Weight - Scale: (!) 156 kg (344 lb 2 2 oz)  First Weight: Weight - Scale: (!) 144 kg (318 lb 5 5 oz)    Lab Results:  I have personally reviewed pertinent labs      CBC:   Lab Results   Component Value Date    WBC 13 30 (H) 09/15/2021    HGB 7 9 (L) 09/15/2021    HCT 25 4 (L) 09/15/2021    MCV 93 09/15/2021     09/15/2021    MCH 28 5 09/15/2021    MCHC 30 6 (L) 09/15/2021    RDW 21 2 (H) 09/15/2021    MPV 10 3 09/15/2021    NRBC 0 09/15/2021     CMP:   Lab Results   Component Value Date    K 4 0 09/15/2021    CL 91 (L) 09/15/2021    CO2 27 09/15/2021    BUN 58 (H) 09/15/2021    CREATININE 2 30 (H) 09/15/2021    CALCIUM 8 3 09/15/2021    AST 13 09/15/2021    ALT 11 (L) 09/15/2021    ALKPHOS 186 (H) 09/15/2021    EGFR 29 09/15/2021     Phosphorus: No results found for: PHOS  Magnesium:   Lab Results   Component Value Date    MG 2 0 09/15/2021     Urinalysis: No results found for: COLORU, CLARITYU, SPECGRAV, PHUR, LEUKOCYTESUR, NITRITE, PROTEINUA, GLUCOSEU, KETONESU, BILIRUBINUR, BLOODU  Ionized Calcium: No results found for: CAION  Coagulation:   Lab Results   Component Value Date    INR 2 86 (H) 09/15/2021     Troponin: No results found for: TROPONINI  ABG: No results found for: PHART, PHR2HLM, PO2ART, EWY0SFI, Q7IEGSLC, BEART, SOURCE    Results from last 7 days   Lab Units 09/15/21  0520 09/14/21  0547 09/14/21  0012   POTASSIUM mmol/L 4 0 3 6 3 7   CHLORIDE mmol/L 91* 90* 90*   CO2 mmol/L 27 30 30   BUN mg/dL 58* 47* 48*   CREATININE mg/dL 2 30* 1 89* 1 93*   CALCIUM mg/dL 8 3 8 1* 8 0*   ALK PHOS U/L 186* 196* 216*   ALT U/L 11* 11* 10*   AST U/L 13 19 20       Radiology review:  Procedure: CT chest abdomen pelvis wo contrast    Result Date: 9/13/2021  Narrative: CT CHEST, ABDOMEN AND PELVIS WITHOUT IV CONTRAST INDICATION:   Panniculitis and sepsis  Evaluation for evidence of necrotizing fracture  COMPARISON:  None  TECHNIQUE: CT examination of the chest, abdomen and pelvis was performed without intravenous contrast   Axial, sagittal, and coronal 2D reformatted images were created from the source data and submitted for interpretation  Radiation dose length product (DLP) for this visit:  2456 99 mGy-cm     This examination, like all CT scans performed in the [facility], was performed utilizing techniques to minimize radiation dose exposure, including the use of iterative reconstruction and automated exposure control  Enteric contrast was administered  FINDINGS: CHEST LUNGS AND PLEURA:  Large chronic right pleural effusion with probable partial loculation at the anterolateral base  Unchanged round atelectasis of the adjacent basilar right middle and lower lobes  No acute consolidations  Bilateral straight interlobular septal thickening suggesting edema  No nodularity or evidence for fibrosis  No endobronchial lesions  No left-sided effusion  No pneumothorax on either side  HEART/GREAT VESSELS:  Four-chamber enlargement of the heart  Hypodensity of the blood pole relative to intraventricular septal myocardium may suggest anemia  Coronary calcifications  Small amount of pericardial fluid not qualifying as effusion  Thoracic aorta is normal for age  Chronic ectasia of the main pulmonary artery up to 41 mm  MEDIASTINUM AND DILLON:  Right paratracheal node at the thoracic inlet measuring 7 mm in short axis (2/12) --similar to prior  Other shotty lymph nodes in the pretracheal space and AP window also appear unchanged  No pathologic lymphadenopathy  CHEST WALL AND LOWER NECK:   Symmetric moderate to severe discogenic gynecomastia  Mild diffuse anasarca  Superficial edema of the chest is less severe than at the abdomen--discussed below  ABDOMEN LIVER/BILIARY TREE:  Relative hypertrophy of the left lobe compared to the right  Lobular contour  Mild parenchymal heterogeneity  Constellation of findings consistent with cirrhosis  No discrete masses evident on this noncontrast study  No biliary ductal dilatation  GALLBLADDER: Chronically contracted with endoluminal stones  No evidence for acute cholecystitis  SPLEEN:  Unchanged chronic congestive splenomegaly with bandlike parenchymal calcifications probably related to prior infarcts or trauma    Appearance is unchanged from priors  PANCREAS:  Unremarkable  ADRENAL GLANDS:  Somewhat obscured by adjacent edema in the periadrenal fat  No gross abnormalities  KIDNEYS/URETERS: No contour distorting masses, perinephric collections, urolithiasis, or hydronephrosis  STOMACH AND BOWEL: Small hiatal hernia  No gastric wall thickening  No dilated or inflamed loops of small bowel  No colonic inflammation  APPENDIX:  Not well demonstrated  No evidence for acute appendicitis  ABDOMINOPELVIC CAVITY:  Strandy mesenteric edema and free fluid tracking along the pericolic gutters  32 mm rounded, smoothly marginated cystic focus adjacent to the 2nd portion of the duodenum appears unchanged since August 2020, but new from more remote priors, speaking against duplication cyst   This may represent duodenal diverticulum  Internal homogeneous fluid density and lack of change from priors confirms benignancy  No free air  Shotty bilateral iliac lymph nodes  None are pathologically enlarged by size criteria  VESSELS:  Evaluation of the hepatic vasculature limited by lack of contrast, though central portal venous structures appear somewhat stenotic  There is recannulization of the umbilical vein  Scattered perigastric and perisplenic varices are demonstrated as are splenorenal collaterals  Large superficial collateral vessels demonstrated in the proximal thigh and inguinal region  Pronounced engorgement of the IVC attributable to cardiac congestion  PELVIS REPRODUCTIVE ORGANS:  Unremarkable for patient's age  URINARY BLADDER:  Normal when accounting for degree of nondistention  ABDOMINAL WALL/INGUINAL REGIONS:  Diffuse anasarca  Focally worse subcutaneous stranding and dermal thickening at the ventral pannus  No evidence for inflammatory changes extending into the peritoneal fat  Inferior portion of the scrotum is not entirely covered, but visualized portion only shows mild edema    No encapsulated collections or gas within the soft tissues around the lower pannus, perineal region, and visualized portions of the scrotum  Large superficial varicosities around the medial thighs and inguinal regions, as previously discussed  Fat-containing left inguinal hernia  OSSEOUS STRUCTURES:  No acute fracture or destructive osseous lesion  Impression: 1  Disproportionate dermal thickening and subcutaneous edema in the ventral lower pannus without encapsulated collections, soft tissue gas, or inflammatory changes extending into the perineum  Finding is superimposed on mild diffuse anasarca  2   Large, chronic, partially loculated right pleural effusion with associated rounded atelectasis at right lung base  3   Mild pulmonary interstitial edema without alveolar consolidations  Workstation performed: [id number]     Procedure: XR chest portable ICU    Result Date: 9/14/2021  Narrative: CHEST INDICATION:   verify placement of central line  COMPARISON:  Chest radiograph from 8/16/2021 and chest CT from 9/13/2021  EXAM PERFORMED/VIEWS:  XR CHEST PORTABLE ICU FINDINGS:  Right jugular catheter in right atrium  Moderate cardiomegaly  Redemonstration of small loculated right pleural effusion and rounded atelectasis in the right base  No pneumothorax  Osseous structures appear within normal limits for patient age  Impression: Right jugular catheter in right atrium  To be at the cavoatrial junction, it could be retracted 3 cm  Redemonstration of right base opacity due to small chronic loculated right pleural effusion and rounded atelectasis in the right base  Workstation performed: [id number]         Xander Wellington PA-C      Portions of the record may have been created with voice recognition software  Occasional wrong word or "sound a like" substitutions may have occurred due to the inherent limitations of voice recognition software  Read the chart carefully and recognize, using context, where substitutions have occurred

## 2021-09-15 NOTE — ASSESSMENT & PLAN NOTE
Chronic venous insufficiency with concurrent hypoalbuminemia, cirrhosis, and right-sided CHF  Patient has also been fluid resuscitated, with worsening lower extremity edema  Given MARISABEL holding off on patient's diuretic therapy, while awaiting renal input

## 2021-09-15 NOTE — PROGRESS NOTES
Elevated vanco trough level of 37 1   All doses will be held today  Random level to be drawn tomorrow  Will redose at a much lower dose once a random level comes back at less than 20

## 2021-09-15 NOTE — ASSESSMENT & PLAN NOTE
History of a chronic, partially loculated right pleural effusion with associated atelectasis at the right lung base  He was admitted from 05/26 through 06/09/2021 for this, and transferred to [facility] - treated with chest tube with infusion of tPA  Repeat scan on 05/30 revealed a loculated right hydropneumothorax, after which he underwent a right-sided thoracoscopic decortication 06/02/2021  He was scheduled for thoracentesis via IR on 09/14 - given clinical picture decided to hold off on thoracentesis  CT of the chest shows a large chronic right pleural effusion with probable partial loculations  Will continue to hold off and monitor, and if sepsis does not resolve consider a diagnostic thoracentesis

## 2021-09-15 NOTE — ASSESSMENT & PLAN NOTE
Last ECHO 02/2021 with preserved LVEF, mildly increased LV wall thickness, and mildly reduced RV systolic function  · CT 9/13 with vascular congestion, IV single regiment consistent with cardiac congestion  · Patient demonstrating weight gain and net positive fluid status  · Has received significant IV fluids in the setting of septic shock, which were discontinued yesterday  · Also with hypoalbuminemia which is contributing to worsening lower extremity edema  · Overall appears volume overloaded, but also with concurrent MARISABEL  · Home diuretic therapy in the form of 80 mg furosemide BID has been on hold since admission  · Will recommend attempts at diuresis today with careful monitoring of volume status and renal function  Continue to monitor daily weights and I/O's

## 2021-09-15 NOTE — ASSESSMENT & PLAN NOTE
Lab Results   Component Value Date    HGBA1C 5 5 05/21/2021     Recent Labs     09/13/21  1807 09/14/21  0959 09/14/21  1253 09/14/21  1917   POCGLU 177* 216* 234* 195*     Does not appear to be in any glycemic agents as an outpatient, with A1c in the normal range  Continue monitor blood sugars

## 2021-09-15 NOTE — ASSESSMENT & PLAN NOTE
Lab Results   Component Value Date    EGFR 29 09/15/2021    EGFR 37 09/14/2021    EGFR 36 09/14/2021    CREATININE 2 30 (H) 09/15/2021    CREATININE 1 89 (H) 09/14/2021    CREATININE 1 93 (H) 09/14/2021   · Present on admission with a creatinine of 2 06, with baseline of 1 2 -1 4 - creatinine had been improving, worsened today  Initial presentation with sepsis and septic shock - presumed prerenal etiology with creatinine improving with IV fluids  Patient with concurrent worsening anemia and hypoalbuminemia as well  However, patient also has a history of right-sided and potentially diastolic CHF, and appears to be volume overloaded, with evidence of vascular congestion and IVC engorgement by CT, weight gain, and net positive fluid balance  · Will attempt diuresis today and re-evaluate renal function  · Check urine studies  · Attempts at bladder scan massimo successful due to pain - patient with overlying panniculitis  · Consider renal ultrasound  · Nephrology officially consulted

## 2021-09-15 NOTE — PLAN OF CARE
Problem: OCCUPATIONAL THERAPY ADULT  Goal: Performs self-care activities at highest level of function for planned discharge setting  See evaluation for individualized goals  Description: Treatment Interventions: ADL retraining, Functional transfer training, UE strengthening/ROM, Endurance training, Patient/family training, Equipment evaluation/education, Activityengagement          See flowsheet documentation for full assessment, interventions and recommendations  Note: Limitation: Decreased ADL status, Decreased UE strength, Decreased Safe judgement during ADL, Decreased endurance, Decreased self-care trans, Decreased high-level ADLs     Assessment: Pt is a 57 y o  male seen for OT evaluation s/p admit to [facility] on 9/13/2021 w/ Septic shock ([facility] [address] )  Comorbidities affecting pt's functional performance at time of assessment include: a-fib, DM, neuropathy, HTN, cardiac disease, cardiomyopathy, MARISABEL, cirrhosis of liver  Personal factors affecting pt at time of IE include:steps to enter environment, difficulty performing ADLS, difficulty performing IADLS , limited insight into deficits, decreased initiation and engagement  and health management   Prior to admission, pt was (A) with ADLs and IADLs with use of SPC during mobility  Upon evaluation: Pt requires (S)-max (A) level with use of SPC during mobility 2* the following deficits impacting occupational performance: weakness, decreased strength, decreased balance, decreased tolerance, impaired initiation, impaired problem solving and decreased safety awareness  Pt to benefit from continued skilled OT tx while in the hospital to address deficits as defined above and maximize level of functional independence w ADL's and functional mobility  Occupational Performance areas to address include: grooming, bathing/shower, toilet hygiene, dressing, functional mobility, community mobility and clothing management   The patient's raw score on the AM-PAC Daily Activity inpatient short form is 19, standardized score is 40 22, greater than 39 4  Patients at this level are likely to benefit from discharge to home  Please refer to the recommendation of the Occupational Therapist for safe discharge planning  Co treatment with PT secondary to complex medical condition of pt, possible A of 2 required to achieve and maintain transitional movements, requiring the need of skilled therapeutic intervention of 2 therapists to achieve delivery of services       OT Discharge Recommendation: Home with home health rehabilitation

## 2021-09-15 NOTE — ASSESSMENT & PLAN NOTE
Baseline in the 130-140s range, presented with a value of 127 along with concurrent hypochloremia  In the setting of sepsis thought secondary to hypovolemia, but patient appears volume overloaded currently  Sodium appears stable  Will attempt diuresis as below, and monitor volume status closely  Sodium currently stable at 128 - continue to monitor

## 2021-09-15 NOTE — OCCUPATIONAL THERAPY NOTE
Occupational Therapy Evaluation     Patient Name: Lu Roldan  HTOCM'Z Date: 9/15/2021  Problem List  Principal Problem:    Septic shock ([place] [address] )  Active Problems:    Type 2 diabetes mellitus with diabetic chronic kidney disease (HCC)    Chronic venous insufficiency    STEFF (obstructive sleep apnea)    Pleural effusion, right    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 ascites (HCC)    Chronic anemia    Atrial fibrillation (HCC)    Hyponatremia    Acute kidney injury superimposed on chronic kidney disease (HCC)    Chronic right-sided congestive heart failure ([place] [address] )    Panniculitis    Hypoalbuminemia    Past Medical History  Past Medical History:   Diagnosis Date    A-fib ([place] [address] )     Acute renal failure superimposed on stage 3b chronic kidney disease ([place] [address] ) 03/15/2021    Cardiac disease     Chronic combined systolic (congestive) and diastolic (congestive) heart failure (HCC)     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 ascites (HCC)     Class 3 severe obesity due to excess calories with serious comorbidity and body mass index (BMI) of 45 0 to 49 9 in adult ([place] [address] ) 10/17/2018    Diabetes mellitus ([place] [address] )     Dilated cardiomyopathy ([place] [address] )     History of echocardiogram 11/24/2014    EF 0 30 (30%), Likely mod LV systolic dysfunction  Likely RV dysfunction as well   History of Lexiscan MPI 02/19/2016    EF 0 43 (43%), no prior MI or ischemia   Hx of echocardiogram 04/28/2017    Normal EF, Normal LV systolic function  Mild concentric LV hypertrophy  Mild mitral and tricuspid regurg      Hx: recurrent pneumonia     Hypertension     Hypokalemia 09/04/2020    Long term (current) use of anticoagulants     Morbid (severe) obesity due to excess calories (HCC)     Neuropathy     Pleural effusion on right     Stage 3 chronic kidney disease (HCC)      Past Surgical History  Past Surgical History:   Procedure Laterality Date    HERNIA REPAIR      IR CHEST TUBE CHECK/CHANGE/REPOSITION/UPSIZE  5/27/2021    IR CHEST TUBE PLACEMENT  5/22/2021  IR THORACENTESIS  8/25/2020    IR THORACENTESIS  2/23/2021    LUNG DECORTICATION Right 6/2/2021    Procedure: DECORTICATION LUNG;  Surgeon: Verenice Roberto MD;  Location: [facility];  Service: Thoracic    SPLENECTOMY, TOTAL      THORACOSCOPY VIDEO ASSISTED SURGERY (VATS) Right 6/2/2021    Procedure: THORACOSCOPY VIDEO ASSISTED SURGERY (VATS); Surgeon: Verenice Roberto MD;  Location: [facility];  Service: Thoracic    VASCULAR SURGERY Right     leg        09/15/21 1416   OT Last Visit   OT Visit Date 09/15/21   Note Type   Note type Evaluation   Restrictions/Precautions   Weight Bearing Precautions Per Order No   Other Precautions Fall Risk;Telemetry;Multiple lines   Pain Assessment   Pain Assessment Tool Pain Assessment not indicated - pt denies pain   Home Living   Type of [facility] Two level;Bed/bath upstairs;Stairs to enter with rails; Other (Comment)  (4 EZEKIEL c HR; FOS to 2nd c HR)   Bathroom Shower/Tub Tub/shower unit   Bathroom Toilet Standard   Bathroom Accessibility Accessible   Home Equipment Cane   Additional Comments pt reports use of SPC during mobility at baseline    Prior Function   Level of [place] Needs assistance with ADLs and functional mobility; Needs assistance with IADLs   Lives With Spouse   Receives Help From Family   ADL Assistance Needs assistance   IADLs Needs assistance   Falls in the last 6 months 1 to 4   Vocational Part time employment   Comments pt is (I) with driving   Psychosocial   Psychosocial (WDL) WDL   Subjective   Subjective "when I am at 300 lbs I can do everything"   ADL   Where Assessed Chair   LB Dressing Assistance 2  Maximal Assistance   LB Dressing Deficit Don/doff R sock; Don/doff L sock   Additional Comments pt with significant difficulties to perform LB dressing at this time; requires max (A)    Bed Mobility   Additional Comments seated in chair at start and end of session; pt on RA with SPO2 WFL with minimal SOB throughout session following activity   Transfers   Sit to Stand 5  Supervision   Additional items Increased time required   Stand to Sit 5  Supervision   Additional items Increased time required   Additional Comments pt performed functional transfers this date with (S) level and use of SPC; no significant LOB; mild instability initially   Functional Mobility   Functional Mobility 5  Supervision   Additional Comments pt performed funcitonal mobility in hallway; peformed ~70ft with x1 standing rest break; pt limited by endurance and fatigue; no significant LOB or instability   Additional items SPC   Balance   Static Sitting Good   Dynamic Sitting Good   Static Standing Fair +   Dynamic Standing Fair +   Ambulatory Fair -   Activity Tolerance   Activity Tolerance Patient limited by fatigue   RUE Assessment   RUE Assessment WFL   LUE Assessment   LUE Assessment WFL   Hand Function   Gross Motor Coordination Functional   Fine Motor Coordination Functional   Sensation   Light Touch No apparent deficits   Sharp/Dull No apparent deficits   Cognition   Overall Cognitive Status WFL   Arousal/Participation Alert   Attention Within functional limits   Orientation Level Oriented X4   Memory Within functional limits   Following Commands Follows all commands and directions without difficulty   Assessment   Limitation Decreased ADL status; Decreased UE strength;Decreased Safe judgement during ADL;Decreased endurance;Decreased self-care trans;Decreased high-level ADLs   Assessment Pt is a 57 y o  male seen for OT evaluation s/p admit to [facility] on 9/13/2021 w/ Septic shock ([facility] [address] )  Comorbidities affecting pt's functional performance at time of assessment include: a-fib, DM, neuropathy, HTN, cardiac disease, cardiomyopathy, MARISABEL, cirrhosis of liver   Personal factors affecting pt at time of IE include:steps to enter environment, difficulty performing ADLS, difficulty performing IADLS , limited insight into deficits, decreased initiation and engagement  and health management   Prior to admission, pt was (A) with ADLs and IADLs with use of SPC during mobility  Upon evaluation: Pt requires (S)-max (A) level with use of SPC during mobility 2* the following deficits impacting occupational performance: weakness, decreased strength, decreased balance, decreased tolerance, impaired initiation, impaired problem solving and decreased safety awareness  Pt to benefit from continued skilled OT tx while in the hospital to address deficits as defined above and maximize level of functional independence w ADL's and functional mobility  Occupational Performance areas to address include: grooming, bathing/shower, toilet hygiene, dressing, functional mobility, community mobility and clothing management  The patient's raw score on the AM-PAC Daily Activity inpatient short form is 19, standardized score is 40 22, greater than 39 4  Patients at this level are likely to benefit from discharge to home  Please refer to the recommendation of the Occupational Therapist for safe discharge planning  Co treatment with PT secondary to complex medical condition of pt, possible A of 2 required to achieve and maintain transitional movements, requiring the need of skilled therapeutic intervention of 2 therapists to achieve delivery of services  Goals   Patient Goals to go home    Short Term Goal  pt will perform UE strengthening exercises    Long Term Goal #1 pt will demonstrate toilet transfers and hygiene at (I) level    Long Term Goal #2 pt will demonstrate functional mobility with SPC at mod (I) level    Long Term Goal pt will demonstrate UB/LB bathing and grooming tasks at min (A) level    Plan   Treatment Interventions ADL retraining;Functional transfer training;UE strengthening/ROM; Endurance training;Patient/family training;Equipment evaluation/education; Activityengagement   Goal Expiration Date 09/29/21   OT Frequency 3-5x/wk   Recommendation   OT Discharge Recommendation Home with home health rehabilitation   AM-PAC Daily Activity Inpatient   Lower Body Dressing 2   Bathing 2   Toileting 3   Upper Body Dressing 4   Grooming 4   Eating 4   Daily Activity Raw Score 19   Daily Activity Standardized Score (Calc for Raw Score >=11) 40 22   AM-PAC Applied Cognition Inpatient   Following a Speech/Presentation 4   Understanding Ordinary Conversation 4   Taking Medications 3   Remembering Where Things Are Placed or Put Away 3   Remembering List of 4-5 Errands 3   Taking Care of Complicated Tasks 3   Applied Cognition Raw Score 20   Applied Cognition Standardized Score 41 76     Pt will benefit from continued OT services in order to maximize (I) c ADL performance, FM c SPC, and improve overall endurance/strength required to complete functional tasks in preparation for d/c  Pt left seated in chair at end of session; all needs within reach; all lines intact

## 2021-09-15 NOTE — ASSESSMENT & PLAN NOTE
Known past medical history in the setting of CKD and cirrhosis, as well as 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, and on chronic AC with warfarin  Baseline Hgb of 7-8  · Worsening anemia in the setting of acute illness  · FOBT negative X 2    · Has required transfusion with PRBCs, currently post 3 Units  · Continue to trend H/H   · Iron studies with low TIBC and elevated ferritin, consistent with anemia of chronic disease

## 2021-09-15 NOTE — ASSESSMENT & PLAN NOTE
History of AFib, presented with RVR in the setting of sepsis  Chronically on metoprolol succinate twice daily and AC with warfarin  · Required Cardizem gtt for rate control initially - currently attempting to wean off  · Reintroduced metoprolol with tartrate formulation yesterday - increased dose today  · Continue to hold warfarin in the setting of worsening anemia and previously supratherapeutic INR  · Continue to monitor heart rate with cardiopulmonary monitoring  · Monitor with daily INRs

## 2021-09-15 NOTE — ASSESSMENT & PLAN NOTE
Presented with lower abdominal pain onset on the night before admission  Sepsis criteria present with tachycardia, tachypnea, hypotension, leukocytosis, and fever, with associated organ dysfunction with evidence of MARISABEL  Source presumed to be skin and soft tissue in patient with evidence of panniculitis  · CT with dermal thickening and subcutaneous edema in the ventral lower pannus without collection, soft tissue gas, or inflammatory changes extending into the perineum  · Continue broad-spectrum coverage with vancomycin and cefepime, day #2  · Initially on IV fluids - currently on hold as hypotension is improved and patient appears volume overloaded  · Remains on pressor therapy with Levophed, currently in the process of weaning  · Right-sided IJ TLC in place for access   Blood cultures with no growth X 24 hours  Continuous cardiopulmonary monitoring   Procalcitonin elevated 3 89 --> 7 87  Continue trend   Critical care consulted and following

## 2021-09-15 NOTE — PROGRESS NOTES
[address] [facility] [address]  Progress Note - Halle Richards 1960, 57 y o  male MRN: [id number]  Unit/Bed#:  Encounter: [id number]  Primary Care Provider: Cherri Callahan DO   Date and time admitted to hospital: 9/13/2021  9:51 AM    * Septic shock [facility])  Assessment & Plan  Presented with lower abdominal pain onset on the night before admission  Sepsis criteria present with tachycardia, tachypnea, hypotension, leukocytosis, and fever, with associated organ dysfunction with evidence of MARISABEL  Source presumed to be skin and soft tissue in patient with evidence of panniculitis  · CT with dermal thickening and subcutaneous edema in the ventral lower pannus without collection, soft tissue gas, or inflammatory changes extending into the perineum  · Continue broad-spectrum coverage with vancomycin and cefepime, day #2  · Initially on IV fluids - currently on hold as hypotension is improved and patient appears volume overloaded  · Remains on pressor therapy with Levophed, currently in the process of weaning  · Right-sided IJ TLC in place for access   Blood cultures with no growth X 24 hours  Continuous cardiopulmonary monitoring   Procalcitonin elevated 3 89 --> 7 87  Continue trend   Critical care consulted and following  Panniculitis  Assessment & Plan  Treatment as above  Acute kidney injury superimposed on chronic kidney disease [facility])  Assessment & Plan  Lab Results   Component Value Date    EGFR 29 09/15/2021    EGFR 37 09/14/2021    EGFR 36 09/14/2021    CREATININE 2 30 (H) 09/15/2021    CREATININE 1 89 (H) 09/14/2021    CREATININE 1 93 (H) 09/14/2021   · Present on admission with a creatinine of 2 06, with baseline of 1 2 -1 4 - creatinine had been improving, worsened today  Initial presentation with sepsis and septic shock - presumed prerenal etiology with creatinine improving with IV fluids  Patient with concurrent worsening anemia and hypoalbuminemia as well  However, patient also has a history of right-sided and potentially diastolic CHF, and appears to be volume overloaded, with evidence of vascular congestion and IVC engorgement by CT, weight gain, and net positive fluid balance  · Will attempt diuresis today and re-evaluate renal function  · Check urine studies  · Attempts at bladder scan massimo successful due to pain - patient with overlying panniculitis  · Consider renal ultrasound  · Nephrology officially consulted  Chronic anemia  Assessment & Plan  Known past medical history in the setting of CKD and cirrhosis, as well as 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, and on chronic AC with warfarin  Baseline Hgb of 7-8  · Worsening anemia in the setting of acute illness  · FOBT negative X 2    · Has required transfusion with PRBCs, currently post 3 Units  · Continue to trend H/H   · Iron studies with low TIBC and elevated ferritin, consistent with anemia of chronic disease  Atrial fibrillation [facility])  Assessment & Plan  History of AFib, presented with RVR in the setting of sepsis  Chronically on metoprolol succinate twice daily and AC with warfarin  · Required Cardizem gtt for rate control initially - currently attempting to wean off  · Reintroduced metoprolol with tartrate formulation yesterday - increased dose today  · Continue to hold warfarin in the setting of worsening anemia and previously supratherapeutic INR  · Continue to monitor heart rate with cardiopulmonary monitoring  · Monitor with daily INRs  Hyponatremia  Assessment & Plan  Baseline in the 130-140s range, presented with a value of 127 along with concurrent hypochloremia  In the setting of sepsis thought secondary to hypovolemia, but patient appears volume overloaded currently  Sodium appears stable  Will attempt diuresis as below, and monitor volume status closely  Sodium currently stable at 128 - continue to monitor      Pleural effusion, right  Assessment & Plan  History of a chronic, partially loculated right pleural effusion with associated atelectasis at the right lung base  He was admitted from 05/26 through 06/09/2021 for this, and transferred to [facility] - treated with chest tube with infusion of tPA  Repeat scan on 05/30 revealed a loculated right hydropneumothorax, after which he underwent a right-sided thoracoscopic decortication 06/02/2021  He was scheduled for thoracentesis via IR on 09/14 - given clinical picture decided to hold off on thoracentesis  CT of the chest shows a large chronic right pleural effusion with probable partial loculations  Will continue to hold off and monitor, and if sepsis does not resolve consider a diagnostic thoracentesis  Chronic right-sided congestive heart failure [facility])  Assessment & Plan  Last ECHO 02/2021 with preserved LVEF, mildly increased LV wall thickness, and mildly reduced RV systolic function  · CT 9/13 with vascular congestion, IV single regiment consistent with cardiac congestion  · Patient demonstrating weight gain and net positive fluid status  · Has received significant IV fluids in the setting of septic shock, which were discontinued yesterday  · Also with hypoalbuminemia which is contributing to worsening lower extremity edema  · Overall appears volume overloaded, but also with concurrent MARISABEL  · Home diuretic therapy in the form of 80 mg furosemide BID has been on hold since admission  · Will recommend attempts at diuresis today with careful monitoring of volume status and renal function  Continue to monitor daily weights and I/O's  Hypoalbuminemia  Assessment & Plan  In the setting of cirrhosis  Given patient's septic shock, hypotension, and concurrent low albumin he received albumin infusions  Chronic venous insufficiency  Assessment & Plan  Chronic venous insufficiency with concurrent hypoalbuminemia, cirrhosis, and right-sided CHF    Patient has also been fluid resuscitated, with worsening lower extremity edema  Given MARISABEL holding off on patient's diuretic therapy, while awaiting renal input  Type 2 diabetes mellitus with diabetic chronic kidney disease [facility])  Assessment & Plan  Lab Results   Component Value Date    HGBA1C 5 5 05/21/2021     Recent Labs     09/13/21  1807 09/14/21  0959 09/14/21  1253 09/14/21  1917   POCGLU 177* 216* 234* 195*     Does not appear to be in any glycemic agents as an outpatient, with A1c in the normal range  Continue monitor blood sugars  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 ascites ([facility] [address] )  Assessment & Plan  Known past medical history secondary to alcoholic and fatty liver disease  · Follows outpatient with Gastroenterology; last seen on 08/04/2021  · Not a candidate for liver transplant given BMI and comorbidities  · Avoid hepatotoxic medications  · Serial lab monitoring    STEFF (obstructive sleep apnea)  Assessment & Plan  Known past medical history with CPAP compliance at home  Continue CPAP HS per home settings  VTE Pharmacologic Prophylaxis:   Pharmacologic: Warfarin (Coumadin) - currently on hold  Mechanical VTE Prophylaxis in Place: Yes    Patient Centered Rounds: I have performed bedside rounds with nursing staff today  Discussions with Specialists or Other Care Team Provider:  Pulmonology    Education and Discussions with Family / Patient:  Yes    Time Spent for Care: 45 minutes  More than 50% of total time spent on counseling and coordination of care as described above  Current Length of Stay: 2 day(s)    Current Patient Status: Inpatient   Certification Statement: The patient will continue to require additional inpatient hospital stay due to Continue treatment of sepsis, MARISABEL, AFib with RVR, and anemia  Discharge Plan:  TBD    Code Status: Level 1 - Full Code      Subjective:   Patient seen and examined - complaining of worsening edema, but overall feeling improved  Cardizem drip and Levophed both weaning    No overnight events were reported  Remains afebrile  Objective:     Vitals:   Temp (24hrs), Av 5 °F (36 4 °C), Min:96 8 °F (36 °C), Max:98 °F (36 7 °C)    Temp:  [96 8 °F (36 °C)-98 °F (36 7 °C)] 97 8 °F (36 6 °C)  HR:  [] 94  Resp:  [16-43] 20  BP: ()/(39-80) 111/68  SpO2:  [90 %-100 %] 98 %  Body mass index is 46 67 kg/m²  Input and Output Summary (last 24 hours): Intake/Output Summary (Last 24 hours) at 9/15/2021 1039  Last data filed at 9/15/2021 1003  Gross per 24 hour   Intake 2656 44 ml   Output --   Net 2656 44 ml       Physical Exam:     Physical Exam  Vitals and nursing note reviewed  Constitutional:       General: He is not in acute distress  Appearance: He is well-developed  He is obese  He is ill-appearing  He is not toxic-appearing  Comments: Chronically ill-appearing  Cardiovascular:      Rate and Rhythm: Tachycardia present  Rhythm irregular  Heart sounds: No murmur heard  Pulmonary:      Effort: Pulmonary effort is normal  No respiratory distress  Breath sounds: Rales present  No rhonchi  Comments: Decreased right basilar breath sounds, mild bibasilar crackles  Abdominal:      General: There is distension  Palpations: Abdomen is soft  Tenderness: There is no abdominal tenderness  There is no guarding or rebound  Musculoskeletal:         General: Swelling present  No tenderness  Cervical back: Neck supple  Comments: Significant bilateral acute on chronic appearing lower extremity edema  Changes consistent with chronic venous stasis  Skin:     General: Skin is warm and dry  Comments: Erythema and warmth overlying lower ventral pannus appear improved since prior exam    Neurological:      General: No focal deficit present  Mental Status: He is alert and oriented to person, place, and time     Psychiatric:         Mood and Affect: Mood normal          Behavior: Behavior normal        Additional Data:     Labs:    Results from last 7 days   Lab Units 09/15/21  0520 09/13/21  1009   WBC Thousand/uL 13 30* 18 40*   HEMOGLOBIN g/dL 7 2* 7 4*   HEMATOCRIT % 23 5* 23 9*   PLATELETS Thousands/uL 246 294   BANDS PCT %  --  13*   NEUTROS PCT % 86*  --    LYMPHS PCT % 4*  --    LYMPHO PCT %  --  4*   MONOS PCT % 6  --    MONO PCT %  --  1*   EOS PCT % 1 0     Results from last 7 days   Lab Units 09/15/21  0520   SODIUM mmol/L 128*   POTASSIUM mmol/L 4 0   CHLORIDE mmol/L 91*   CO2 mmol/L 27   BUN mg/dL 58*   CREATININE mg/dL 2 30*   ANION GAP mmol/L 10   CALCIUM mg/dL 8 3   ALBUMIN g/dL 2 4*   TOTAL BILIRUBIN mg/dL 1 63*   ALK PHOS U/L 186*   ALT U/L 11*   AST U/L 13   GLUCOSE RANDOM mg/dL 150*     Results from last 7 days   Lab Units 09/15/21  0520   INR  2 86*     Results from last 7 days   Lab Units 09/14/21  1917 09/14/21  1253 09/14/21  0959 09/13/21  1807 09/13/21  1550   POC GLUCOSE mg/dl 195* 234* 216* 177* 122         Results from last 7 days   Lab Units 09/14/21  0547 09/13/21  1100   LACTIC ACID mmol/L  --  1 7   PROCALCITONIN ng/ml 7 87* 3 89*           * I Have Reviewed All Lab Data Listed Above  * Additional Pertinent Lab Tests Reviewed: All Labs Within Last 24 Hours Reviewed    Imaging:    Imaging Reports Reviewed Today Include: CT c/a/p     Recent Cultures (last 7 days):     Results from last 7 days   Lab Units 09/13/21  1100   BLOOD CULTURE  No Growth at 24 hrs  No Growth at 24 hrs         Last 24 Hours Medication List:   Current Facility-Administered Medications   Medication Dose Route Frequency Provider Last Rate    acetaminophen  650 mg Oral Q6H PRN Sony Murphy MD      albumin human  25 g Intravenous Q8H Ben Tollievr PA-C      cefepime  2,000 mg Intravenous Q12H Sony Murphy MD 2,000 mg (09/15/21 0911)    diltiazem  1-15 mg/hr Intravenous Titrated Sony Murphy MD 5 mg/hr (09/15/21 9726)    metoprolol tartrate  37 5 mg Oral Q12H [facility] [place], MD      midodrine  10 mg Oral TID [facility] Ben Tolliver PA-C  nicotine  1 patch Transdermal Daily Candido Parry MD      norepinephrine  1-30 mcg/min Intravenous Titrated Candido Parry MD 3 mcg/min (09/14/21 2328)    octreotide  50 mcg Subcutaneous [facility] Ben Tolliver PA-C      ondansetron  4 mg Intravenous Q6H PRN Candido Parry MD      pantoprazole  40 mg Oral Early Morning Candido Parry MD      polyethylene glycol  17 g Oral Daily PRN Candido Parry MD      vancomycin  15 mg/kg (Adjusted) Intravenous Q12H Candido Parry MD 1,500 mg (09/14/21 2323)        Today, Patient Was Seen By: Candido Parry MD    ** Please Note: Dictation voice to text software may have been used in the creation of this document   **

## 2021-09-15 NOTE — ASSESSMENT & PLAN NOTE
Known past medical history secondary to alcoholic and fatty liver disease  · Follows outpatient with Gastroenterology; last seen on 08/04/2021  · Not a candidate for liver transplant given BMI and comorbidities  · Avoid hepatotoxic medications    · Serial lab monitoring

## 2021-09-15 NOTE — PROGRESS NOTES
Progress Note - Pulmonary   Adrien Taveras 57 y o  male MRN: [id number]  Unit/Bed#:  Encounter: [id number]    Assessment/Plan:    1  Septic shock secondary to panniculitis  1  Sepsis criteria present on admission with tachycardia, tachypnea, hypotension, leukocytosis, fever with associated organ dysfunction is evidence of MARISABEL and source of infection likely panniculitis based on CT findings  2  Trend WBC, PCT, and temps  3  Negative: BC [email or web address]  4  Continue broad spectrum ABX with cefepime and vancomycin (if MRSA negative can discontinue vancomycin)  5  Titrate levophed (currently on 1 mcg/min) to maintain MAPs of 60   2  Right pleural effusion  1  Recurrent s/p multiple right sided thoracentesis   1  August 2020: transudative, negative for malignant cells, negative culture   2  February 2021: Transudative, negative for malignant cells, positive cultures (polymicrobial)  3  May 2021:  Exudative with neutrophil predominance and 11% eosinophils, cytology negative, negative culture  2  S/p right VATS decortication in June 2021  3  Likely not contributing to current hospitalization but if patient remains hypotensive/septic could consider repeat thoracentesis for diagnostic purposes  3  MARISABEL superimposed on CKD   1  Likely prerenal basedon BUN:Cr ratio, possibly cardiorenal syndrome vs hepatorenal syndrome  2  Worsening on today's labs- continue to trend   3  Avoid nephrotoxic agents as tolerated  4  Trial Lasix per primary team   5  Add Albumin 20 g IV q 8 hours  6  Add midodrine 10 mg p o  t i d   7  Add octreotide 50 mcg subQ q 8 hours  4  Acute on chronic anemia  1  Secondary to CKD and cirrhosis as well as 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   2  S/p 3 units of PRBCs  3  Trend H&H  4  Continue transfusions for Hgb less than 7   5  Acute on chronic diastolic CHF  1  Appears hypervolemic on exam   2  Trial Lasix per primary team  3  Monitor I/Os and daily weights   6   Atrial fibrillation with RVR 1  Rates currently controlled  2  Wean cardizem as able (current rate of 5)  3  Continue metoprolol per primary team   4   anticoagulation held in the setting acute on chronic anemia  7  Hyponatremia  1  Continue to monitor   2  Trial Lasix as above  8  Cirrhosis   1   follows with Gastroenterology  2  Continue to monitor  3  Avoid hepatoxic agents as able  4  Further treatment per primary tean  9  STEFF  1  Utilize CPAP during all hours of sleep   10  DM type II  1  Monitor blood glucose levels closely  2   further management per primary team    Chief Complaint:    "I feel like a tick ready to pop "    Subjective:     patient was seen examined today  No overnight events reported  Upon entering the room, patient is seen on room air sitting in recliner in no acute distress  Patient reports that he feels about the same compared to yesterday  Currently he states that he feels like he has a tick full of blood ready to pop  He does have worsening shortness of breath from his baseline but denies worsening cough, sputum production, hemoptysis or wheeze  No chest pain or palpitations presently  No fevers or chills  States his legs are more swollen than normal   His abdomen continues to be abnormally loaded and is very tender to touch  Objective:    Vitals: Blood pressure 105/53, pulse 87, temperature (!) 96 4 °F (35 8 °C), temperature source Temporal, resp  rate 18, height 6' (1 829 m), weight (!) 156 kg (344 lb 2 2 oz), SpO2 95 %  room air,Body mass index is 46 67 kg/m²        Intake/Output Summary (Last 24 hours) at 9/15/2021 1223  Last data filed at 9/15/2021 1003  Gross per 24 hour   Intake 2656 44 ml   Output --   Net 2656 44 ml       Invasive Devices     Central Venous Catheter Line            CVC Central Lines 09/14/21 Triple 20cm 1 day          Peripheral Intravenous Line            Peripheral IV 09/13/21 Right Antecubital 2 days    Peripheral IV 09/13/21 Right Wrist 2 days                Physical Exam: Physical Exam  Vitals and nursing note reviewed  Constitutional:       General: He is not in acute distress  Appearance: Normal appearance  He is obese  HENT:      Head: Normocephalic and atraumatic  Right Ear: External ear normal       Left Ear: External ear normal       Nose: Nose normal       Mouth/Throat:      Mouth: Mucous membranes are moist       Pharynx: Oropharynx is clear  Eyes:      Extraocular Movements: Extraocular movements intact  Conjunctiva/sclera: Conjunctivae normal       Pupils: Pupils are equal, round, and reactive to light  Cardiovascular:      Rate and Rhythm: Normal rate and regular rhythm  Pulses: Normal pulses  Heart sounds: No murmur heard  Pulmonary:      Effort: Pulmonary effort is normal  No respiratory distress  Breath sounds: No wheezing  Comments: Decreased BS RLL  Abdominal:      General: Bowel sounds are normal  There is distension  Palpations: Abdomen is soft  Tenderness: There is abdominal tenderness  There is no rebound  Musculoskeletal:         General: No tenderness or deformity  Normal range of motion  Cervical back: Normal range of motion and neck supple  No muscular tenderness  Right lower leg: Edema present  Left lower leg: Edema present  Skin:     General: Skin is warm and dry  Findings: Erythema (bilateral LE) present  Neurological:      General: No focal deficit present  Mental Status: He is alert and oriented to person, place, and time  Mental status is at baseline  Psychiatric:         Mood and Affect: Mood normal          Behavior: Behavior normal          Thought Content: Thought content normal          Judgment: Judgment normal          Labs: I have personally reviewed pertinent lab results  , ABG: No results found for: PHART, GJP8UDI, PO2ART, VZD9RJS, A7TSJGBB, BEART, SOURCE, BNP: No results found for: BNP, CBC:   Lab Results   Component Value Date    WBC 13 30 (H) 09/15/2021    HGB 7 9 (L) 09/15/2021    HCT 25 4 (L) 09/15/2021    MCV 93 09/15/2021     09/15/2021    MCH 28 5 09/15/2021    MCHC 30 6 (L) 09/15/2021    RDW 21 2 (H) 09/15/2021    MPV 10 3 09/15/2021    NRBC 0 09/15/2021   , CMP:   Lab Results   Component Value Date    SODIUM 128 (L) 09/15/2021    K 4 0 09/15/2021    CL 91 (L) 09/15/2021    CO2 27 09/15/2021    BUN 58 (H) 09/15/2021    CREATININE 2 30 (H) 09/15/2021    CALCIUM 8 3 09/15/2021    AST 13 09/15/2021    ALT 11 (L) 09/15/2021    ALKPHOS 186 (H) 09/15/2021    EGFR 29 09/15/2021   , PT/INR:   Lab Results   Component Value Date    INR 2 86 (H) 09/15/2021   , Troponin: No results found for: TROPONINI    Imaging and other studies: I have personally reviewed pertinent reports     and I have personally reviewed pertinent films in PACS

## 2021-09-15 NOTE — PHYSICAL THERAPY NOTE
Physical Therapy Evaluation    Patient Name: Lu Roldan    ZPMPQ'M Date: 9/15/2021     Problem List  Principal Problem:    Septic shock ([place] [address] )  Active Problems:    Type 2 diabetes mellitus with diabetic chronic kidney disease (HCC)    Chronic venous insufficiency    STEFF (obstructive sleep apnea)    Pleural effusion, right    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 ascites (HCC)    Chronic anemia    Atrial fibrillation (HCC)    Hyponatremia    Acute kidney injury superimposed on chronic kidney disease (HCC)    Chronic right-sided congestive heart failure ([place] [address] )    Panniculitis    Hypoalbuminemia       Past Medical History  Past Medical History:   Diagnosis Date    A-fib ([place] [address] )     Acute renal failure superimposed on stage 3b chronic kidney disease ([place] [address] ) 03/15/2021    Cardiac disease     Chronic combined systolic (congestive) and diastolic (congestive) heart failure (HCC)     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 ascites (HCC)     Class 3 severe obesity due to excess calories with serious comorbidity and body mass index (BMI) of 45 0 to 49 9 in adult ([place] [address] ) 10/17/2018    Diabetes mellitus ([place] [address] )     Dilated cardiomyopathy ([place] [address] )     History of echocardiogram 11/24/2014    EF 0 30 (30%), Likely mod LV systolic dysfunction  Likely RV dysfunction as well   History of Lexiscan MPI 02/19/2016    EF 0 43 (43%), no prior MI or ischemia   Hx of echocardiogram 04/28/2017    Normal EF, Normal LV systolic function  Mild concentric LV hypertrophy  Mild mitral and tricuspid regurg      Hx: recurrent pneumonia     Hypertension     Hypokalemia 09/04/2020    Long term (current) use of anticoagulants     Morbid (severe) obesity due to excess calories (HCC)     Neuropathy     Pleural effusion on right     Stage 3 chronic kidney disease (HCC)         Past Surgical History  Past Surgical History:   Procedure Laterality Date    HERNIA REPAIR      IR CHEST TUBE CHECK/CHANGE/REPOSITION/UPSIZE  5/27/2021    IR CHEST TUBE PLACEMENT  5/22/2021    IR THORACENTESIS  8/25/2020    IR THORACENTESIS  2/23/2021    LUNG DECORTICATION Right 6/2/2021    Procedure: DECORTICATION LUNG;  Surgeon: Vianney Pandya MD;  Location: [facility];  Service: Thoracic    SPLENECTOMY, TOTAL      THORACOSCOPY VIDEO ASSISTED SURGERY (VATS) Right 6/2/2021    Procedure: THORACOSCOPY VIDEO ASSISTED SURGERY (VATS); Surgeon: Vianney Pandya MD;  Location: [facility];  Service: Thoracic    VASCULAR SURGERY Right     leg           09/15/21 1417   PT Last Visit   PT Visit Date 09/15/21   Note Type   Note type Evaluation   Pain Assessment   Pain Assessment Tool Pain Assessment not indicated - pt denies pain   Home Living   Additional Comments see OT eval   Prior Function   Comments see OT eval   Restrictions/Precautions   Weight Bearing Precautions Per Order No   Other Precautions Fall Risk;Multiple lines;Telemetry   General   Family/Caregiver Present No   Cognition   Overall Cognitive Status WFL   Arousal/Participation Alert   Attention Within functional limits   Orientation Level Oriented X4   Following Commands Follows all commands and directions without difficulty   Comments pt is pleasant and cooperative   RLE Assessment   RLE Assessment WFL  (assessed functionally d/t significant edema)   LLE Assessment   LLE Assessment WFL  (assessed functionally d/t significant edema)   Bed Mobility   Additional Comments pt OOB at start/end of session   Transfers   Sit to Stand 5  Supervision   Additional items Increased time required;Verbal cues   Stand to Sit 5  Supervision   Additional items Increased time required;Verbal cues   Additional Comments SPC used   Ambulation/Elevation   Gait pattern Short stride; Foward flexed;Decreased foot clearance; Wide NICOLLE   Gait Assistance 5  Supervision   Additional items Verbal cues   Assistive Device [facility]   Distance 60'   Balance   Static Sitting Good   Dynamic Sitting Good   Static Standing Fair +   Dynamic Standing Fair +   Ambulatory Fair  (with SPC)   Endurance Deficit   Endurance Deficit Yes   Endurance Deficit Description pt easily becoming SOB with exertion   Activity Tolerance   Activity Tolerance Patient limited by fatigue   Assessment   Prognosis Good   Problem List Decreased strength;Decreased endurance; Impaired balance;Decreased mobility;Pain;Obesity   Assessment Patient is a 57 y o  male evaluated by Physical Therapy s/p admit to [facility] [place] on 9/13/2021 with admitting diagnosis of: Panniculitis, Abdominal pain, Renal insufficiency, Septic shock, and principal problem of: Septic shock  PT was consulted to assess patient's functional mobility and discharge needs  Ordered are PT Evaluation and treatment with activity level of: up and OOB as tolerated  Comorbidities affecting patient's physical performance at time of assessment include: a-fib, DM, neuropathy, HTN, CHF, CKD, cirrhosis of liver  Personal factors affecting the patient at time of IE include: lives in 2 story home, ambulating with assistive device, step(s) to enter home, inability to navigate community distances, history of fall(s), inability/difficulty performing IADLs and inability/difficulty performing ADLs  Please locate objective findings from PT assessment regarding body systems outlined above  Upon evaluation, pt able to perform all functional mobility with SUP, SPC, and increased time  Occasional verbal cuing provided for direction  Pt becoming increasingly SOB with exertion and did require seated rest break following 60' of ambulation  Despite symptoms, SpO2 and HR remained WFL on RA throughout  No true LOB experienced  The patient's AM-PAC Basic Mobility Inpatient Short Form Raw Score is 21, Standardized Score is 45 55  A standardized score greater than 42 9 suggests the patient may benefit from discharge to home   Please also refer to the recommendation of the Physical Therapist for safe discharge planning  Pt will benefit from continued PT intervention during LOS to address current deficits, increase LOF, and facilitate safe d/c to next level of care when medically appropriate  D/c recommendation at this time is home with outpatient rehabilitation  Co treatment with OT secondary to complex medical condition of pt, possible A of 2 required to achieve and maintain transitional movements, requiring the need of skilled therapeutic intervention of 2 therapists to achieve delivery of services  Goals   Patient Goals to go home   Short Term Goal #1 Pt will participate in B LE strengthening exercises to facilitate improved functional mobility  Short Term Goal #2 Pt will perform all functional transfers and bed mobility mod(I) with good safety awareness  LTG Expiration Date 09/29/21   Long Term Goal #1 Pt will ambulate 200' mod(I) with SPC while maintaining good functional dynamic balance  Long Term Goal #2 Pt will ascend/descend 4 stairs with HR and SUP to reflect the ability to safely enter his home  Plan   Treatment/Interventions Functional transfer training;LE strengthening/ROM; Elevations; Therapeutic exercise; Endurance training;Bed mobility;Gait training   PT Frequency Other (Comment)  (3-5x/week)   Recommendation   PT Discharge Recommendation Home with outpatient rehabilitation   AM-PAC Basic Mobility Inpatient   Turning in Bed Without Bedrails 3   Lying on Back to Sitting on Edge of Flat Bed 3   Moving Bed to Chair 4   Standing Up From Chair 4   Walk in Room 4   Climb 3-5 Stairs 3   Basic Mobility Inpatient Raw Score 21   Basic Mobility Standardized Score 45 55     Pt seated in recliner at end of session with all needs in reach

## 2021-09-15 NOTE — ASSESSMENT & PLAN NOTE
In the setting of cirrhosis  Given patient's septic shock, hypotension, and concurrent low albumin he received albumin infusions

## 2021-09-15 NOTE — QUICK NOTE
Appearance of brief run of nonsustained V-tach on cardiopulmonary monitoring  Patient's last EF approximately 6 months ago was preserved  Has remained in AFib and with sepsis, originally septic shock  Maintained on pressor therapy and Cardizem GTT which was discontinued this afternoon  Patient is asymptomatic, an episode was brief  Will check stat labs to ensure that electrolytes remain ideal   Continue to monitor

## 2021-09-16 PROBLEM — I47.2 NSVT (NONSUSTAINED VENTRICULAR TACHYCARDIA) (HCC): Status: ACTIVE | Noted: 2021-01-01

## 2021-09-16 NOTE — ASSESSMENT & PLAN NOTE
Presented with lower abdominal pain onset on the night before admission  Sepsis criteria present with tachycardia, tachypnea, hypotension, leukocytosis, and fever, with associated organ dysfunction with evidence of MARISABEL  Source presumed to be skin and soft tissue in patient with evidence of panniculitis  · CT with dermal thickening and subcutaneous edema in the ventral lower pannus without collection, soft tissue gas, or inflammatory changes extending into the perineum  · Continue broad-spectrum coverage with vancomycin and cefepime, day #3  · Initially on IV fluids - discontinued  · Levophed successfully weaned off  · Right-sided IJ TLC in place for access   Blood cultures with no growth X 48 hours   Procalcitonin elevated 3 89 --> 7 87, 8 77  Patient's infection appears to be improving and well controlled, with elevating procalcitonin potentially on the basis of worsening renal function  Continue to trend   Was initiated on midodrine, octreotide, and additional albumin by Critical Care yesterday   Critical care consulted and following

## 2021-09-16 NOTE — PHYSICAL THERAPY NOTE
PHYSICAL THERAPY NOTE          Patient Name: Noel Yang  EOMAR'E Date: 9/16/2021 09/16/21 1133   Note Type   Note Type Treatment   Pain Assessment   Pain Assessment Tool 0-10   Pain Score No Pain   Restrictions/Precautions   Weight Bearing Precautions Per Order No   Other Precautions Fall Risk;Multiple lines;Telemetry   General   Family/Caregiver Present No   Cognition   Overall Cognitive Status WFL   Following Commands Follows all commands and directions without difficulty   Subjective   Subjective Pt  would like to ambulate  Reports the pain medication has been controlling the paiin  Bed Mobility   Additional Comments OOB in chair at start and end of PT session   Transfers   Sit to Stand 5  Supervision   Additional items Assist x 1; Armrests; Increased time required   Stand to Sit 5  Supervision   Additional items Assist x 1; Armrests; Increased time required   Stand pivot 5  Supervision   Ambulation/Elevation   Gait pattern Short stride; Foward flexed; Wide NICOLLE; Decreased foot clearance   Gait Assistance 5  Supervision   Additional items Assist x 1;Verbal cues   Assistive Device Straight cane   Distance 70' x 2 with standing rest break   Balance   Static Sitting Good   Dynamic Sitting Good   Static Standing Fair +   Dynamic Standing Fair   Ambulatory [facility])   Endurance Deficit   Endurance Deficit Yes   Activity Tolerance   Activity Tolerance Patient limited by fatigue   Exercises   Hip Flexion Sitting;20 reps;Bilateral   Hip Abduction Sitting;20 reps   Hip Adduction Sitting;20 reps   Knee AROM Long Arc Quad Sitting;20 reps   Ankle Pumps Sitting;20 reps   Assessment   Prognosis Good   Problem List Decreased strength;Decreased endurance; Impaired balance;Decreased mobility;Pain;Obesity   Assessment Pt  seen for PT treatment session this date with interventions consisting of  therapeutic exercises, transfers and  gait training w/ emphasis on improving pt's ability to ambulate  Pt  Currently performing  tx and ambulation at (SUP ) x 1 level of function  The patient's AM-PAC Basic Mobility Inpatient Short Form Raw Score is 21, Standardized Score is 45 55  A standardized score greater than  42 9 suggests the patient may benefit from discharge to home  Please also refer to physical therapy recommendation for safe DC planning  In comparison to previous session, Pt  With improvements in activity tolerance  No episodes LOB  HR elevated 115-120 with ambulation  Pt is in need of continued activity in PT to improve strength balance endurance mobility transfers and ambulation with return to maximize LOF  From PT/mobility standpoint, recommendation at time of d/c would be OP PT  in order to promote return to PLOF and independence  Goals   LTG Expiration Date 09/29/21   PT Treatment Day 1   Plan   Treatment/Interventions Functional transfer training;LE strengthening/ROM; Elevations; Therapeutic exercise; Endurance training;Bed mobility;Gait training   Progress Progressing toward goals   Recommendation   PT Discharge Recommendation Home with outpatient rehabilitation   AM-PAC Basic Mobility Inpatient   Turning in Bed Without Bedrails 3   Lying on Back to Sitting on Edge of Flat Bed 3   Moving Bed to Chair 4   Standing Up From Chair 4   Walk in Room 4   Climb 3-5 Stairs 3   Basic Mobility Inpatient Raw Score 21   Basic Mobility Standardized Score 45 55   Pt  OOB in chair  with call bell within reach, all lines intact at end of PT session  Discussed with  PT today's treatment and patient's current level of function for care coordination

## 2021-09-16 NOTE — ASSESSMENT & PLAN NOTE
History of AFib, presented with RVR in the setting of sepsis  Chronically on metoprolol succinate twice daily and AC with warfarin  · Required Cardizem gtt for rate control initially - currently off  · Reintroduced metoprolol with tartrate formulation previously - increased dose yesterday  · Continue to hold warfarin in the setting of worsening anemia and previously supratherapeutic INR  · Continue to monitor heart rate with cardiopulmonary monitoring  · Monitor with daily INRs

## 2021-09-16 NOTE — ASSESSMENT & PLAN NOTE
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

## 2021-09-16 NOTE — PROGRESS NOTES
[facility] [address] [place]  Progress Note - Noel Yang 1960, 57 y o  male MRN: [id number]  Unit/Bed#:  Encounter: [id number]  Primary Care Provider: Josseline Sotelo DO   Date and time admitted to hospital: 9/13/2021  9:51 AM    * Septic shock [facility])  Assessment & Plan  Presented with lower abdominal pain onset on the night before admission  Sepsis criteria present with tachycardia, tachypnea, hypotension, leukocytosis, and fever, with associated organ dysfunction with evidence of MARISABEL  Source presumed to be skin and soft tissue in patient with evidence of panniculitis  · CT with dermal thickening and subcutaneous edema in the ventral lower pannus without collection, soft tissue gas, or inflammatory changes extending into the perineum  · Continue broad-spectrum coverage with vancomycin and cefepime, day #3  · Initially on IV fluids - discontinued  · Levophed successfully weaned off  · Right-sided IJ TLC in place for access   Blood cultures with no growth X 48 hours   Procalcitonin elevated 3 89 --> 7 87, 8 77  Patient's infection appears to be improving and well controlled, with elevating procalcitonin potentially on the basis of worsening renal function  Continue to trend   Was initiated on midodrine, octreotide, and additional albumin by Critical Care yesterday   Critical care consulted and following  Panniculitis  Assessment & Plan  Treatment as above  Acute kidney injury superimposed on chronic kidney disease [facility])  Assessment & Plan  Lab Results   Component Value Date    EGFR 26 09/16/2021    EGFR 26 09/15/2021    EGFR 29 09/15/2021    CREATININE 2 54 (H) 09/16/2021    CREATININE 2 54 (H) 09/15/2021    CREATININE 2 30 (H) 09/15/2021   · Present on admission with a creatinine of 2 06, with baseline of 1 2 -1 4 - creatinine had been improving, worsened yesterday and unchanged today at 2 54      Initial presentation with sepsis and septic shock - presumed prerenal etiology with creatinine improving with IV fluids  Patient with concurrent worsening anemia and hypoalbuminemia as well  Current FENA 0% indicating prerenal etiology  Patient also has a history of right-sided and potentially diastolic CHF, and appears to be volume overloaded, with evidence of vascular congestion and IVC engorgement by CT, weight gain, and net positive fluid balance  Patient is overall volume overloaded while likely intravascularly depleted  · Remains on diuresis with unchanged renal function today  · Also remains off IV fluids  · Attempts at bladder scan unsuccessful due to pain - patient with overlying panniculitis  · Renal ultrasound without evidence of hydronephrosis  · Nephrology officially consulted - input appreciated  Chronic anemia  Assessment & Plan  Known past medical history in the setting of CKD and cirrhosis, as well as 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, and on chronic AC with warfarin  Baseline Hgb of 7-8  · Worsening anemia in the setting of acute illness  · FOBT negative X 2    · Has required transfusion with PRBCs, currently s/p 3 Units  · Continue to trend H/H   · Iron studies with low TIBC and elevated ferritin, consistent with anemia of chronic disease  Atrial fibrillation [facility])  Assessment & Plan  History of AFib, presented with RVR in the setting of sepsis  Chronically on metoprolol succinate twice daily and AC with warfarin  · Required Cardizem gtt for rate control initially - currently off  · Reintroduced metoprolol with tartrate formulation previously - increased dose yesterday  · Continue to hold warfarin in the setting of worsening anemia and previously supratherapeutic INR  · Continue to monitor heart rate with cardiopulmonary monitoring  · Monitor with daily INRs  Hyponatremia  Assessment & Plan  Baseline in the 130-140s range, presented with a value of 127 along with concurrent hypochloremia    In the setting of sepsis thought secondary to hypovolemia, but patient appears volume overloaded currently  Sodium appears stable  Will attempt diuresis as below, and monitor volume status closely  Sodium currently improved at 130 - continue to monitor  Pleural effusion, right  Assessment & Plan  History of a chronic, partially loculated right pleural effusion with associated atelectasis at the right lung base  He was admitted from 05/26 through 06/09/2021 for this, and transferred to [facility] - treated with chest tube with infusion of tPA  Repeat scan on 05/30 revealed a loculated right hydropneumothorax, after which he underwent a right-sided thoracoscopic decortication 06/02/2021  He was scheduled for thoracentesis via IR on 09/14 - given clinical picture decided to hold off on thoracentesis  CT of the chest shows a large chronic right pleural effusion with probable partial loculations  Will continue to hold off and monitor, and if sepsis does not resolve consider a diagnostic thoracentesis  Chronic right-sided congestive heart failure [facility])  Assessment & Plan  Last ECHO 02/2021 with preserved LVEF, mildly increased LV wall thickness, and mildly reduced RV systolic function  · CT 9/13 with vascular congestion, IVC engorgement consistent with cardiac congestion  · Patient demonstrating weight gain and net positive fluid status  · Has received significant IV fluids in the setting of septic shock, which were discontinued yesterday  · Also with hypoalbuminemia which is contributing to worsening lower extremity edema  · Overall appears volume overloaded, but also with concurrent MARISABEL and low FENA  · Home diuretic therapy in the form of 80 mg furosemide BID has been on hold since admission  · Was initiated on IV furosemide 60 mg BID, 1st dose last evening  Nephrology input appreciated  · Continue to monitor daily weights and I/O's  Hypoalbuminemia  Assessment & Plan  In the setting of cirrhosis    Given patient's septic shock, hypotension, and concurrent low albumin - has received albumin infusions  Chronic venous insufficiency  Assessment & Plan  Chronic venous insufficiency with concurrent hypoalbuminemia, cirrhosis, and right-sided CHF  Patient has also been fluid resuscitated, with worsening lower extremity edema  Type 2 diabetes mellitus with diabetic chronic kidney disease [facility])  Assessment & Plan  Lab Results   Component Value Date    HGBA1C 5 5 05/21/2021     Recent Labs     09/14/21  1917 09/15/21  1228 09/15/21  1726 09/16/21  0806   POCGLU 195* 176* 184* 156*     Does not appear to be in any glycemic agents as an outpatient, with A1c in the normal range  Continue monitor blood sugars  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 ascites ([facility] [address] )  Assessment & Plan  Known past medical history secondary to alcoholic and fatty liver disease  · Follows outpatient with Gastroenterology; last seen on 08/04/2021  · Not a candidate for liver transplant given BMI and comorbidities  · Avoid hepatotoxic medications  · Serial lab monitoring    STEFF (obstructive sleep apnea)  Assessment & Plan  Known past medical history with CPAP compliance at home  Continue CPAP HS per home settings  NSVT (nonsustained ventricular tachycardia) ([facility])  Assessment & Plan  Brief run of nonsustained V-tach on cardiopulmonary monitoring on 09/15 late in the afternoon  Patient's EF approximately 6 months ago was preserved  Has remained in AFib and with sepsis, original septic shock  Was also maintained on Levophed and Cardizem gtt  Patient was asymptomatic and episode was very brief  Continue to monitor renal function and electrolytes carefully, and aim for potassium greater than 4 and magnesium greater than 2  Continue cardiopulmonary monitoring  VTE Pharmacologic Prophylaxis:   Pharmacologic: Warfarin (Coumadin) - currently on hold  Mechanical VTE Prophylaxis in Place:  Yes     Patient Centered Rounds: I have performed bedside rounds with nursing staff today      Discussions with Specialists or Other Care Team Provider:  Pulmonology     Education and Discussions with Family / Patient: Yes     Time Spent for Care: 45 minutes  More than 50% of total time spent on counseling and coordination of care as described above      Current Length of Stay: 2 day(s)     Current Patient Status: Inpatient   Certification Statement: The patient will continue to require additional inpatient hospital stay due to Continue treatment of sepsis, MARISABEL, AFib with RVR, and anemia      Discharge Plan:  TBD     Code Status: Level 1 - Full Code    Subjective:   Patient seen and examined - reporting improvement in symptoms overall  Both Levophed and Cardizem drips were discontinued, with adequate blood pressure and heart rate control at the time of visit  Cultures remain negative  Renal function has worsened since initial presentation, but remains stable from yesterday  No overnight events reported  Remains afebrile  Objective:     Vitals:   Temp (24hrs), Av 8 °F (36 °C), Min:96 2 °F (35 7 °C), Max:97 8 °F (36 6 °C)    Temp:  [96 2 °F (35 7 °C)-97 8 °F (36 6 °C)] 97 8 °F (36 6 °C)  HR:  [] 120  Resp:  [18-41] 34  BP: ()/(53-86) 103/77  SpO2:  [87 %-97 %] 95 %  Body mass index is 47 12 kg/m²  Input and Output Summary (last 24 hours): Intake/Output Summary (Last 24 hours) at 2021 1125  Last data filed at 2021 1101  Gross per 24 hour   Intake 830 ml   Output 830 ml   Net 0 ml       Physical Exam:   Vitals and nursing note reviewed  Constitutional:       General: He is not in acute distress  Appearance: He is well-developed  He is obese  He is ill-appearing  He is not toxic-appearing  Comments: Chronically ill-appearing  Cardiovascular:      Rate and Rhythm: Tachycardia present  Rhythm irregular  Heart sounds: No murmur heard  Pulmonary:      Effort: Pulmonary effort is normal  No respiratory distress        Breath sounds: Rales present  No rhonchi  Comments: Decreased right basilar breath sounds, mild bibasilar crackles  Abdominal:      General: There is distension  Palpations: Abdomen is soft  Tenderness: There is no abdominal tenderness  There is no guarding or rebound  Musculoskeletal:         General: Swelling present  No tenderness  Cervical back: Neck supple  Comments: Significant bilateral acute on chronic appearing lower extremity edema  Changes consistent with chronic venous stasis  Skin:     General: Skin is warm and dry  Comments: Erythema and warmth overlying lower ventral pannus appear improved since prior exam    Neurological:      General: No focal deficit present  Mental Status: He is alert and oriented to person, place, and time     Psychiatric:         Mood and Affect: Mood normal          Behavior: Behavior normal      Additional Data:     Labs:    Results from last 7 days   Lab Units 09/16/21  0452 09/13/21  1009   WBC Thousand/uL 7 61 18 40*   HEMOGLOBIN g/dL 7 7* 7 4*   HEMATOCRIT % 25 4* 23 9*   PLATELETS Thousands/uL 224 294   BANDS PCT %  --  13*   NEUTROS PCT % 81*  --    LYMPHS PCT % 6*  --    LYMPHO PCT %  --  4*   MONOS PCT % 8  --    MONO PCT %  --  1*   EOS PCT % 2 0     Results from last 7 days   Lab Units 09/16/21  0452   SODIUM mmol/L 130*   POTASSIUM mmol/L 4 3   CHLORIDE mmol/L 93*   CO2 mmol/L 28   BUN mg/dL 59*   CREATININE mg/dL 2 54*   ANION GAP mmol/L 9   CALCIUM mg/dL 8 5   ALBUMIN g/dL 2 7*   TOTAL BILIRUBIN mg/dL 1 10*   ALK PHOS U/L 171*   ALT U/L 9*   AST U/L 12   GLUCOSE RANDOM mg/dL 119     Results from last 7 days   Lab Units 09/16/21  0452   INR  2 79*     Results from last 7 days   Lab Units 09/16/21  0806 09/15/21  1726 09/15/21  1228 09/14/21  1917 09/14/21  1253 09/14/21  0959 09/13/21  1807 09/13/21  1550   POC GLUCOSE mg/dl 156* 184* 176* 195* 234* 216* 177* 122         Results from last 7 days   Lab Units 09/15/21  1137 09/14/21  0547 09/13/21  1100   LACTIC ACID mmol/L  --   --  1 7   PROCALCITONIN ng/ml 8 77* 7 87* 3 89*           * I Have Reviewed All Lab Data Listed Above  * Additional Pertinent Lab Tests Reviewed: All Labs Within Last 24 Hours Reviewed     Recent Cultures (last 7 days):     Results from last 7 days   Lab Units 09/13/21  1100   BLOOD CULTURE  No Growth at 48 hrs  No Growth at 48 hrs  Last 24 Hours Medication List:   Current Facility-Administered Medications   Medication Dose Route Frequency Provider Last Rate    albumin human  25 g Intravenous Q8H Ben Tolliver PA-C      cefepime  2,000 mg Intravenous Q12H Ijeoma Best MD 2,000 mg (09/16/21 1031)    diltiazem  1-15 mg/hr Intravenous Titrated Ijeoma Best MD Stopped (09/15/21 1132)    furosemide  60 mg Intravenous BID (diuretic) Ijeoma Best MD      HYDROmorphone  0 5 mg Intravenous Q6H PRN Ijeoma Best MD      metoprolol tartrate  37 5 mg Oral Q12H [facility] Ijeoma Best MD      midodrine  10 mg Oral TID [facility] Ben Tolliver PA-C      nicotine  1 patch Transdermal Daily Ijeoma Best MD      norepinephrine  1-30 mcg/min Intravenous Titrated Ijeoma Best MD Stopped (09/15/21 1255)    octreotide  50 mcg Subcutaneous [facility] Ben Tolliver PA-C      ondansetron  4 mg Intravenous Q6H PRN Ijeoma Best MD      pantoprazole  40 mg Oral Early Morning Ijeoma Best MD      polyethylene glycol  17 g Oral Daily PRN Ijeoma Best MD      vancomycin  1,250 mg Intravenous Q24H Ijeoma Best MD          Today, Patient Was Seen By: Ijeoma Best MD    ** Please Note: Dictation voice to text software may have been used in the creation of this document   **

## 2021-09-16 NOTE — ASSESSMENT & PLAN NOTE
Baseline in the 130-140s range, presented with a value of 127 along with concurrent hypochloremia  In the setting of sepsis thought secondary to hypovolemia, but patient appears volume overloaded currently  Sodium appears stable  Will attempt diuresis as below, and monitor volume status closely  Sodium currently improved at 130 - continue to monitor

## 2021-09-16 NOTE — ASSESSMENT & PLAN NOTE
Chronic venous insufficiency with concurrent hypoalbuminemia, cirrhosis, and right-sided CHF  Patient has also been fluid resuscitated, with worsening lower extremity edema

## 2021-09-16 NOTE — ASSESSMENT & PLAN NOTE
History of a chronic, partially loculated right pleural effusion with associated atelectasis at the right lung base  He was admitted from 05/26 through 06/09/2021 for this, and transferred to [facility] - treated with chest tube with infusion of tPA  Repeat scan on 05/30 revealed a loculated right hydropneumothorax, after which he underwent a right-sided thoracoscopic decortication 06/02/2021  He was scheduled for thoracentesis via IR on 09/14 - given clinical picture decided to hold off on thoracentesis  CT of the chest shows a large chronic right pleural effusion with probable partial loculations  Will continue to hold off and monitor, and if sepsis does not resolve consider a diagnostic thoracentesis

## 2021-09-16 NOTE — PROGRESS NOTES
Progress Note - Pulmonary   Saima Salas 57 y o  male MRN: [id number]  Unit/Bed#:  Encounter: [id number]    Assessment/Plan:    1  Septic shock secondary to panniculitis  1  Sepsis criteria present on admission with tachycardia, tachypnea, hypotension, leukocytosis, fever with associated organ dysfunction is evidence of MARISABEL and source of infection likely panniculitis based on CT findings  2  Trend WBC, PCT, and temps  3  Negative: BC [email or web address]  4  Continue broad spectrum ABX with cefepime and vancomycin (if MRSA negative can discontinue vancomycin)  5  Weaned off vasopressors- continue to monitor hemodynamics closely  2  Right pleural effusion  1  Recurrent, s/p multiple right sided thoracentesis   1  August 2020: transudative, negative for malignant cells, negative culture   2  February 2021: Transudative, negative for malignant cells, positive cultures (polymicrobial)  3  May 2021:  Exudative with neutrophil predominance and 11% eosinophils, cytology negative, negative culture  2  S/p right VATS decortication in June 2021  3  Likely not contributing to current hospitalization but if patient remains hypotensive/septic could consider repeat thoracentesis for diagnostic purposes  3  MARISABEL superimposed on CKD   1  Likely prerenal basedon BUN:Cr ratio, possibly cardiorenal syndrome vs hepatorenal syndrome  2  Worsening on today's labs- continue to trend   3  Avoid nephrotoxic agents as tolerated  4  Lasix per primary team   5  Continue Albumin 20 g IV q 8 hours  6  Continue midodrine 10 mg p o  t i d   7  Continue octreotide 50 mcg subQ q 8 hours  8  Nephrology following- input appreciated   4  Acute on chronic anemia  1  Secondary to CKD and cirrhosis as well as 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   2  S/p 3 units of PRBCs  3  Trend H&H, currently 7 7  4  Continue transfusions for Hgb less than 7   5  Acute on chronic diastolic CHF  1  Appears hypervolemic on exam   2   Continue Lasix per primary team  3  Monitor I/Os and daily weights   6  Atrial fibrillation with RVR   1  Rates currently controlled  2  Wean off cardizem gtt  3  Continue metoprolol per primary team   4   anticoagulation held in the setting acute on chronic anemia  7  Hyponatremia  1  Improving- continue to monitor   2  Trial Lasix as above  8  Cirrhosis   1  Follows with Gastroenterology  2  Continue to monitor  3  Avoid hepatoxic agents as able  4  Further treatment per primary tean  9  STEFF  1  Utilize CPAP during all hours of sleep   10  DM type II  1  Monitor blood glucose levels closely  2   further management per primary team    Chief Complaint:    "I feel a little better today "    Subjective:    Patient was seen and examined today  He had a brief unsustained run of Vtach yesterday  Patient slept with his CPAP very well  Offers no new complaints today  SOB is slightly better  He feels less bloated compared to yesterday  Reports increased urination  No chest painOr palpitations  No fevers or chills  Objective:    Vitals: Blood pressure 103/77, pulse 101, temperature 97 8 °F (36 6 °C), temperature source Temporal, resp  rate (!) 31, height 6' (1 829 m), weight (!) 158 kg (347 lb 7 1 oz), SpO2 95 %  room air,Body mass index is 47 12 kg/m²  Intake/Output Summary (Last 24 hours) at 9/16/2021 1030  Last data filed at 9/16/2021 1001  Gross per 24 hour   Intake 660 ml   Output 680 ml   Net -20 ml       Invasive Devices     Central Venous Catheter Line            CVC Central Lines 09/14/21 Triple 20cm 2 days          Peripheral Intravenous Line            Peripheral IV 09/13/21 Right Antecubital 3 days    Peripheral IV 09/13/21 Right Wrist 3 days                Physical Exam:     Physical Exam  Constitutional:       Appearance: He is normal weight  HENT:      Head: Normocephalic and atraumatic        Right Ear: External ear normal       Left Ear: External ear normal       Nose: Nose normal       Mouth/Throat:      Mouth: Mucous membranes are moist       Pharynx: Oropharynx is clear  Eyes:      Extraocular Movements: Extraocular movements intact  Pupils: Pupils are equal, round, and reactive to light  Cardiovascular:      Rate and Rhythm: Normal rate  Rhythm irregular  Pulses: Normal pulses  Pulmonary:      Effort: Pulmonary effort is normal  No respiratory distress  Breath sounds: No wheezing  Comments: Decreased BS in right base  Abdominal:      Tenderness: There is abdominal tenderness  Comments: Obese abdomen   Musculoskeletal:      Cervical back: Normal range of motion  Right lower leg: Edema present  Left lower leg: Edema present  Skin:     General: Skin is warm and dry  Neurological:      General: No focal deficit present  Mental Status: He is alert and oriented to person, place, and time  Psychiatric:         Mood and Affect: Mood normal          Behavior: Behavior normal          Thought Content: Thought content normal          Judgment: Judgment normal          Labs: I have personally reviewed pertinent lab results  , ABG: No results found for: PHART, IJD8TVX, PO2ART, EUW4VGB, U4MFHAWG, BEART, SOURCE, BNP: No results found for: BNP, CBC:   Lab Results   Component Value Date    WBC 7 61 09/16/2021    HGB 7 7 (L) 09/16/2021    HCT 25 4 (L) 09/16/2021    MCV 94 09/16/2021     09/16/2021    MCH 28 5 09/16/2021    MCHC 30 3 (L) 09/16/2021    RDW 20 5 (H) 09/16/2021    MPV 10 0 09/16/2021    NRBC 0 09/16/2021   , CMP:   Lab Results   Component Value Date    SODIUM 130 (L) 09/16/2021    K 4 3 09/16/2021    CL 93 (L) 09/16/2021    CO2 28 09/16/2021    BUN 59 (H) 09/16/2021    CREATININE 2 54 (H) 09/16/2021    CALCIUM 8 5 09/16/2021    AST 12 09/16/2021    ALT 9 (L) 09/16/2021    ALKPHOS 171 (H) 09/16/2021    EGFR 26 09/16/2021   , PT/INR:   Lab Results   Component Value Date    INR 2 79 (H) 09/16/2021   , Troponin: No results found for: TROPONINI    Imaging and other studies: none to review today

## 2021-09-16 NOTE — ASSESSMENT & PLAN NOTE
In the setting of cirrhosis  Given patient's septic shock, hypotension, and concurrent low albumin - has received albumin infusions

## 2021-09-16 NOTE — PLAN OF CARE
Problem: PHYSICAL THERAPY ADULT  Goal: Performs mobility at highest level of function for planned discharge setting  See evaluation for individualized goals  Description:  Outcome: Progressing  Note: Prognosis: Good  Problem List: Decreased strength, Decreased endurance, Impaired balance, Decreased mobility, Pain, Obesity  Assessment: Pt  seen for PT treatment session this date with interventions consisting of  therapeutic exercises, transfers and  gait training w/ emphasis on improving pt's ability to ambulate  Pt  Currently performing  tx and ambulation at (SUP ) x 1 level of function  The patient's AM-PAC Basic Mobility Inpatient Short Form Raw Score is 21, Standardized Score is 45 55  A standardized score greater than  42 9 suggests the patient may benefit from discharge to home  Please also refer to physical therapy recommendation for safe DC planning  In comparison to previous session, Pt  With improvements in activity tolerance  No episodes LOB  HR elevated 115-120 with ambulation  Pt is in need of continued activity in PT to improve strength balance endurance mobility transfers and ambulation with return to maximize LOF  From PT/mobility standpoint, recommendation at time of d/c would be OP PT  in order to promote return to PLOF and independence  PT Discharge Recommendation: Home with outpatient rehabilitation          See flowsheet documentation for full assessment

## 2021-09-16 NOTE — ASSESSMENT & PLAN NOTE
Last ECHO 02/2021 with preserved LVEF, mildly increased LV wall thickness, and mildly reduced RV systolic function  · CT 9/13 with vascular congestion, IVC engorgement consistent with cardiac congestion  · Patient demonstrating weight gain and net positive fluid status  · Has received significant IV fluids in the setting of septic shock, which were discontinued yesterday  · Also with hypoalbuminemia which is contributing to worsening lower extremity edema  · Overall appears volume overloaded, but also with concurrent MARISABEL and low FENA  · Home diuretic therapy in the form of 80 mg furosemide BID has been on hold since admission  · Was initiated on IV furosemide 60 mg BID, 1st dose last evening  Nephrology input appreciated  · Continue to monitor daily weights and I/O's

## 2021-09-16 NOTE — ASSESSMENT & PLAN NOTE
Lab Results   Component Value Date    EGFR 26 09/16/2021    EGFR 26 09/15/2021    EGFR 29 09/15/2021    CREATININE 2 54 (H) 09/16/2021    CREATININE 2 54 (H) 09/15/2021    CREATININE 2 30 (H) 09/15/2021   · Present on admission with a creatinine of 2 06, with baseline of 1 2 -1 4 - creatinine had been improving, worsened yesterday and unchanged today at 2 54  Initial presentation with sepsis and septic shock - presumed prerenal etiology with creatinine improving with IV fluids  Patient with concurrent worsening anemia and hypoalbuminemia as well  Current FENA 0% indicating prerenal etiology  Patient also has a history of right-sided and potentially diastolic CHF, and appears to be volume overloaded, with evidence of vascular congestion and IVC engorgement by CT, weight gain, and net positive fluid balance  Patient is overall volume overloaded while likely intravascularly depleted  · Remains on diuresis with unchanged renal function today  · Also remains off IV fluids  · Attempts at bladder scan unsuccessful due to pain - patient with overlying panniculitis  · Renal ultrasound without evidence of hydronephrosis  · Nephrology officially consulted - input appreciated

## 2021-09-16 NOTE — ASSESSMENT & PLAN NOTE
Lab Results   Component Value Date    HGBA1C 5 5 05/21/2021     Recent Labs     09/14/21  1917 09/15/21  1228 09/15/21  1726 09/16/21  0806   POCGLU 195* 176* 184* 156*     Does not appear to be in any glycemic agents as an outpatient, with A1c in the normal range  Continue monitor blood sugars

## 2021-09-16 NOTE — ASSESSMENT & PLAN NOTE
Brief run of nonsustained V-tach on cardiopulmonary monitoring on 09/15 late in the afternoon  Patient's EF approximately 6 months ago was preserved  Has remained in AFib and with sepsis, original septic shock  Was also maintained on Levophed and Cardizem gtt  Patient was asymptomatic and episode was very brief  Continue to monitor renal function and electrolytes carefully, and aim for potassium greater than 4 and magnesium greater than 2  Continue cardiopulmonary monitoring

## 2021-09-16 NOTE — PROGRESS NOTES
NEPHROLOGY PROGRESS NOTE   Lucille Fairchild 57 y o  male MRN: [id number]  Unit/Bed#:  Encounter: [id number]    Assessment/Plan:    Lucille Fairchild is a 57 y o  male whose pertinent medical problems include CKD 3, chronic diastolic congestive heart failure, atrial fibrillation, cirrhosis, obesity admitted 9/13 with chief complaint of abdominal pain id being treated for septic shock (shock resolved/POA) secondary to panniculitis  Renal following along for acute kidney injury, volume status, electrolytes  Plan outlined below  1  Acute kidney injury (POA) atop chronic kidney disease  · Presented a creatinine of 1 66 per dL -> 2 54 mg/dL today  Appears to have plateaued  Urine chemistries suggestive of a prerenal etiology with a low FENA and undetectable urine sodium, renal ultrasound difficult to read due to body habitus and edema  Also, please note he had a critically elevated vancomycin level on 09/15 in addition to atrial fibrillation with RVR and septic shock earlier in the admission  · HRS cocktail in addition to diuresis initiated yesterday and renal function plateaued  Difficult to determine which therapy is providing more benefit  He is grossly volume overloaded  As above, will attempt lasix infusion with scheduled albumin  If renal function worsens, would discontinue diuretic attempts and focus on treatment for hepatorenal syndrome    · Plan:   · Switch to lasix infusion starting at 10 mg/hr  He is oliguric thus far  Goal net negative 2 liters in 24 hours  · Continue scheduled concentrated albumin 25 g every 8 hours  · Wean midodrine as able  · Continue to avoid potential nephrotoxins, maintain mean arterial pressure greater than 60 mmHg, monitor for urinary retention  2  Stage 3 chronic kidney disease with baseline creatinine around 1 2 mg/dL  3  Acute on chronic diastolic congestive heart failure, suspected cardiorenal syndrome type 1  · Significantly volume overloaded    Patient states his previous dry weight was around 300 lb  As above, will initiate patient on Lasix infusion with standing concentrated albumin infusions to centralize flow  4  Atrial fibrillation with rapid ventricular response  · Management per primary team   Try to minimize midodrine as able to reduce incidence of tachyarrhythmias  5  Hyponatremia  · Currently on a 1 L per day fluid restriction  Will increase to 1 2 L per day  6  Septic shock (shock resolved)  secondary to panniculitis  · Management per primary critical care team  7  Unspecified cirrhosis of the liver with ascites, rule out HRS  · As above       ROS  No physical complaints  Some dizziness with standing  A complete 10 point review of systems have been performed and are otherwise negative  Historical Information   Past Medical History:   Diagnosis Date    A-fib [facility])     Acute renal failure superimposed on stage 3b chronic kidney disease ([facility] ) 03/15/2021    Cardiac disease     Chronic combined systolic (congestive) and diastolic (congestive) heart failure (HCC)     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 ascites (HCC)     Class 3 severe obesity due to excess calories with serious comorbidity and body mass index (BMI) of 45 0 to 49 9 in adult [facility]) 10/17/2018    Diabetes mellitus ([facility] )     Dilated cardiomyopathy ([place] [address] )     History of echocardiogram 11/24/2014    EF 0 30 (30%), Likely mod LV systolic dysfunction  Likely RV dysfunction as well   History of Lexiscan MPI 02/19/2016    EF 0 43 (43%), no prior MI or ischemia   Hx of echocardiogram 04/28/2017    Normal EF, Normal LV systolic function  Mild concentric LV hypertrophy  Mild mitral and tricuspid regurg      Hx: recurrent pneumonia     Hypertension     Hypokalemia 09/04/2020    Long term (current) use of anticoagulants     Morbid (severe) obesity due to excess calories (HCC)     Neuropathy     Pleural effusion on right     Stage 3 chronic kidney disease (HCC)      Past Surgical History:   Procedure Laterality Date    HERNIA REPAIR      IR CHEST TUBE CHECK/CHANGE/REPOSITION/UPSIZE  5/27/2021    IR CHEST TUBE PLACEMENT  5/22/2021    IR THORACENTESIS  8/25/2020    IR THORACENTESIS  2/23/2021    LUNG DECORTICATION Right 6/2/2021    Procedure: DECORTICATION LUNG;  Surgeon: Shanna Mcclendon MD;  [facility]: [facility];  Service: Thoracic    SPLENECTOMY, TOTAL      THORACOSCOPY VIDEO ASSISTED SURGERY (VATS) Right 6/2/2021    Procedure: THORACOSCOPY VIDEO ASSISTED SURGERY (VATS); Surgeon: Shanna Mcclendon MD;  [facility]: [facility];  Service: Thoracic    VASCULAR SURGERY Right     leg     Social History   Social History     Substance and Sexual Activity   Alcohol Use Yes    Comment: 4 or 5 drinks weekly/bloody chani's     Social History     Substance and Sexual Activity   Drug Use Never     Social History     Tobacco Use   Smoking Status Current Some Day Smoker    Packs/day: 0 25    Types: Cigarettes   Smokeless Tobacco Never Used   Tobacco Comment    smokes 1 pack per week and a half        Family History:   History reviewed  No pertinent family history      Medications:  Pertinent medications were reviewed  Current Facility-Administered Medications   Medication Dose Route Frequency Provider Last Rate    albumin human  25 g Intravenous Q8H Ben Tolliver PA-C      cefepime  2,000 mg Intravenous Q12H Theodore Leblanc MD 2,000 mg (09/16/21 1031)    diltiazem  1-15 mg/hr Intravenous Titrated Theodore Leblanc MD Stopped (09/15/21 1132)    furosemide  60 mg Intravenous BID (diuretic) Theodore Leblanc MD      HYDROmorphone  0 5 mg Intravenous Q6H PRN Theodore Leblanc MD      metoprolol tartrate  37 5 mg Oral Q12H [facility] Theodore Leblanc MD      midodrine  10 mg Oral TID [facility] Ben Tolliver PA-C      nicotine  1 patch Transdermal Daily Theodore Lelbanc MD      norepinephrine  1-30 mcg/min Intravenous Titrated Theodore Leblanc MD Stopped (09/15/21 1255)    octreotide  50 mcg Subcutaneous [facility] MARITZA Tolliver PA-C      ondansetron  4 mg Intravenous Q6H PRN Babs Fonseca MD      pantoprazole  40 mg Oral Early Morning Babs Fonseca MD      polyethylene glycol  17 g Oral Daily PRN Babs Fonseca MD      vancomycin  1,250 mg Intravenous Q24H Babs Fonseca MD           No Known Allergies      Vitals:   /77   Pulse (!) 120   Temp 97 8 °F (36 6 °C) (Temporal)   Resp (!) 34   Ht 6' (1 829 m)   Wt (!) 158 kg (347 lb 7 1 oz)   SpO2 95%   BMI 47 12 kg/m²   Body mass index is 47 12 kg/m²  SpO2: 95 %,   SpO2 Activity: At Rest,   O2 Device: Nasal cannula      Intake/Output Summary (Last 24 hours) at 9/16/2021 1136  Last data filed at 9/16/2021 1101  Gross per 24 hour   Intake 830 ml   Output 830 ml   Net 0 ml     Invasive Devices     Central Venous Catheter Line            CVC Central Lines 09/14/21 Triple 20cm 2 days          Peripheral Intravenous Line            Peripheral IV 09/13/21 Right Antecubital 3 days    Peripheral IV 09/13/21 Right Wrist 3 days                Physical Exam  General: conscious, cooperative, in no acute distress  Eyes: conjunctivae pink, anicteric sclerae  ENT: lips and mucous membranes moist  Neck: supple, no JVD, no masses  Chest: diminished in the bases, no crackles, ronchus or wheezings  CVS: S1 & S2, normal rate, regular rhythm  Abdomen: soft, non-tender, distended, normoactive bowel sounds, obese, large, round   Extremities: severe edema of both legs  Skin: no rash  Neuro: awake, alert, oriented      Diagnostic Data:  Lab: I have personally reviewed pertinent lab results  ,   CBC:  Results from last 7 days   Lab Units 09/16/21  0452   WBC Thousand/uL 7 61   HEMOGLOBIN g/dL 7 7*   HEMATOCRIT % 25 4*   PLATELETS Thousands/uL 224      CMP:   Lab Results   Component Value Date    SODIUM 130 (L) 09/16/2021    K 4 3 09/16/2021    CL 93 (L) 09/16/2021    CO2 28 09/16/2021    BUN 59 (H) 09/16/2021    CREATININE 2 54 (H) 09/16/2021    CALCIUM 8 5 09/16/2021    AST 12 09/16/2021    ALT 9 (L) 09/16/2021    ALKPHOS 171 (H) 09/16/2021    EGFR 26 09/16/2021   ,   PT/INR:   Lab Results   Component Value Date    INR 2 79 (H) 09/16/2021   ,   Magnesium: No components found for: MAG,  Phosphorous: No results found for: PHOS    Microbiology:  @LABRCNTIP,(urinecx:7)@        TRES [place]    Portions of the record may have been created with voice recognition software  Occasional wrong word or "sound a like" substitutions may have occurred due to the inherent limitations of voice recognition software  Read the chart carefully and recognize, using context, where substitutions have occurred

## 2021-09-17 NOTE — PHYSICAL THERAPY NOTE
PHYSICAL THERAPY NOTE          Patient Name: Serg Celis  MHSYG'U Date: 9/17/2021 09/17/21 1151   Note Type   Note Type Treatment   Restrictions/Precautions   Weight Bearing Precautions Per Order No   Other Precautions Fall Risk;Telemetry;Multiple lines   Cognition   Overall Cognitive Status WFL   Following Commands Follows all commands and directions without difficulty   Subjective   Subjective States his feet feel like they are burning when ambulating   Bed Mobility   Additional Comments OOB in chair at start and end of PT session   Transfers   Sit to Stand 5  Supervision   Additional items Armrests; Increased time required   Stand to Sit 5  Supervision   Additional items Armrests; Increased time required   Stand pivot 5  Supervision   Ambulation/Elevation   Gait pattern Excessively slow; Short stride; Foward flexed; Wide NICOLLE   Gait Assistance 5  Supervision   Additional items Verbal cues   Assistive Device Straight cane   Distance 80'   Balance   Static Sitting Good   Dynamic Sitting Good   Static Standing Fair +   Dynamic Standing Fair +   Ambulatory Fair -   Endurance Deficit   Endurance Deficit Yes   Activity Tolerance   Activity Tolerance Patient limited by fatigue   Exercises   Hip Flexion 15 reps   Hip Abduction 20 reps   Hip Adduction 20 reps   Knee AROM Long Arc Quad 20 reps   Ankle Pumps 20 reps   Assessment   Prognosis Good   Problem List Decreased strength;Decreased endurance; Impaired balance;Decreased mobility;Obesity   Assessment Pt  seen for PT treatment session this date with interventions consisting of  therapeutic exercise, transfers and  gait training w/ emphasis on improving pt's ability to ambulate  Pt  Currently performing  tx and ambulation at ( SUP) x 1 level of function  The patient's AM-PAC Basic Mobility Inpatient Short Form Raw Score is 21, Standardized Score is 45 55   A standardized score greater than 42 9 suggests the patient may benefit from discharge to home  Please also refer to physical therapy recommendation for safe DC planning  In comparison to previous session, Pt  With improvements in activity tolerance  Demonstrates improving endurance as he is tolerating increased distance with no rest break  Pt is in need of continued activity in PT to improve strength balance endurance mobility transfers and ambulation with return to maximize LOF  From PT/mobility standpoint, recommendation at time of d/c would be OP PT  in order to promote return to PLOF and independence  Goals   LTG Expiration Date 09/29/21   Plan   Treatment/Interventions Functional transfer training;LE strengthening/ROM; Therapeutic exercise; Endurance training;Bed mobility;Gait training   Progress Progressing toward goals   AM-PAC Basic Mobility Inpatient   Turning in Bed Without Bedrails 3   Lying on Back to Sitting on Edge of Flat Bed 3   Moving Bed to Chair 4   Standing Up From Chair 4   Walk in Room 4   Climb 3-5 Stairs 3   Basic Mobility Inpatient Raw Score 21   Basic Mobility Standardized Score 45 55   Pt  OOB in chair  with call bell within reach, all lines intact  at end of PT session  Discussed with  PT today's treatment and patient's current level of function for care coordination

## 2021-09-17 NOTE — ASSESSMENT & PLAN NOTE
History of AFib, presented with RVR in the setting of sepsis  Chronically on metoprolol succinate twice daily and AC with warfarin  · Required Cardizem gtt for rate control initially - currently off  · Reintroduced metoprolol with tartrate formulation previously - increased dose again today  · Continue to hold warfarin in the setting of worsening anemia and previously supratherapeutic INR  · INR remains therapeutic  Continue to monitor daily  · Continue to monitor heart rate with cardiopulmonary monitoring

## 2021-09-17 NOTE — PLAN OF CARE
Problem: PHYSICAL THERAPY ADULT  Goal: Performs mobility at highest level of function for planned discharge setting  See evaluation for individualized goals  Description:   Outcome: Progressing  Note: Prognosis: Good  Problem List: Decreased strength, Decreased endurance, Impaired balance, Decreased mobility, Obesity  Assessment: Pt  seen for PT treatment session this date with interventions consisting of  therapeutic exercise, transfers and  gait training w/ emphasis on improving pt's ability to ambulate  Pt  Currently performing  tx and ambulation at ( SUP) x 1 level of function  The patient's AM-PAC Basic Mobility Inpatient Short Form Raw Score is 21, Standardized Score is 45 55  A standardized score greater than 42 9 suggests the patient may benefit from discharge to home  Please also refer to physical therapy recommendation for safe DC planning  In comparison to previous session, Pt  With improvements in activity tolerance  Demonstrates improving endurance as he is tolerating increased distance with no rest break  Pt is in need of continued activity in PT to improve strength balance endurance mobility transfers and ambulation with return to maximize LOF  From PT/mobility standpoint, recommendation at time of d/c would be OP PT  in order to promote return to PLOF and independence  PT Discharge Recommendation: Home with outpatient rehabilitation          See flowsheet documentation for full assessment

## 2021-09-17 NOTE — ASSESSMENT & PLAN NOTE
Presented with lower abdominal pain onset on the night before admission  Sepsis criteria present with tachycardia, tachypnea, hypotension, leukocytosis, and fever, with associated organ dysfunction with evidence of MARISABEL  Source presumed to be skin and soft tissue in patient with evidence of panniculitis  · CT with dermal thickening and subcutaneous edema in the ventral lower pannus without collection, soft tissue gas, or inflammatory changes extending into the perineum  · Continue broad-spectrum coverage with vancomycin and cefepime, day #4  · Initially on IV fluids - discontinued  · Levophed successfully weaned off  · Right-sided IJ TLC remains in place due to poor access   Blood cultures with no growth X 48 hours   Procalcitonin elevated 3 89 --> 7 87, 8 77, 7 42 this morning  Initial elevating procalcitonin potentially on the basis of worsening renal function   Was initiated on midodrine, octreotide, and additional albumin by Critical Care previously  Octreotide and albumin discontinued by Nephrology today   Critical care and nephrology consulted and following

## 2021-09-17 NOTE — ASSESSMENT & PLAN NOTE
Known past medical history in the setting of CKD and cirrhosis, as well as 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, and on chronic AC with warfarin  Baseline Hgb of 7-8  · Worsening anemia in the setting of acute illness  · FOBT negative X 2    · Has required transfusion with PRBCs, currently s/p 3 Units  · Continue to trend H/H - current stable at 7 8  · Iron studies with low TIBC and elevated ferritin, consistent with anemia of chronic disease

## 2021-09-17 NOTE — ASSESSMENT & PLAN NOTE
Lab Results   Component Value Date    EGFR 24 09/17/2021    EGFR 26 09/16/2021    EGFR 26 09/15/2021    CREATININE 2 68 (H) 09/17/2021    CREATININE 2 54 (H) 09/16/2021    CREATININE 2 54 (H) 09/15/2021   · Present on admission with a creatinine of 2 06, with baseline of 1 2 -1 4 - creatinine had been improving, worsened yesterday and unchanged today at 2 86  Initial presentation with sepsis and septic shock - presumed prerenal etiology with creatinine improving with IV fluids  Patient with concurrent worsening anemia and hypoalbuminemia as well  Initial FENA 0% indicating prerenal etiology  Patient also has a history of right-sided and potentially diastolic CHF, and appears to be volume overloaded, with evidence of vascular congestion and IVC engorgement by CT, weight gain, and net positive fluid balance  Patient is overall volume overloaded while likely intravascularly depleted  · Remains on diuresis with worsening renal function today  · Remains off IV fluids  · Attempts at bladder scan unsuccessful due to pain - patient with overlying panniculitis  · Renal ultrasound without evidence of hydronephrosis  · Plan is for discontinuation of albumin and octreotide, continuation of Lasix gtt, and close monitoring of renal function  · Continue to avoid nephrotoxins, and renally dose medications when appropriate  · Nephrology officially consulted - input appreciated

## 2021-09-17 NOTE — ASSESSMENT & PLAN NOTE
Lab Results   Component Value Date    HGBA1C 5 5 05/21/2021     Recent Labs     09/16/21  0806 09/16/21  1148 09/16/21  1616 09/17/21  0734   POCGLU 156* 155* 170* 148*     Does not appear to be in any glycemic agents as an outpatient, with A1c in the normal range  Continue monitor blood sugars

## 2021-09-17 NOTE — PROGRESS NOTES
[address] [facility] with [facility] [address] [place]  Progress Note - Adrien Taveras 1960, 57 y o  male MRN: [id number]  Unit/Bed#:  Encounter: [id number]  Primary Care Provider: Susanna Monroy DO   Date and time admitted to hospital: 9/13/2021  9:51 AM    * Septic shock [facility])  Assessment & Plan  Presented with lower abdominal pain onset on the night before admission  Sepsis criteria present with tachycardia, tachypnea, hypotension, leukocytosis, and fever, with associated organ dysfunction with evidence of MARISABEL  Source presumed to be skin and soft tissue in patient with evidence of panniculitis  · CT with dermal thickening and subcutaneous edema in the ventral lower pannus without collection, soft tissue gas, or inflammatory changes extending into the perineum  · Continue broad-spectrum coverage with vancomycin and cefepime, day #4  · Initially on IV fluids - discontinued  · Levophed successfully weaned off  · Right-sided IJ TLC remains in place due to poor access   Blood cultures with no growth X 48 hours   Procalcitonin elevated 3 89 --> 7 87, 8 77, 7 42 this morning  Initial elevating procalcitonin potentially on the basis of worsening renal function   Was initiated on midodrine, octreotide, and additional albumin by Critical Care previously  Octreotide and albumin discontinued by Nephrology today   Critical care and nephrology consulted and following  Panniculitis  Assessment & Plan  Treatment as above  Acute kidney injury superimposed on chronic kidney disease [facility])  Assessment & Plan  Lab Results   Component Value Date    EGFR 24 09/17/2021    EGFR 26 09/16/2021    EGFR 26 09/15/2021    CREATININE 2 68 (H) 09/17/2021    CREATININE 2 54 (H) 09/16/2021    CREATININE 2 54 (H) 09/15/2021   · Present on admission with a creatinine of 2 06, with baseline of 1 2 -1 4 - creatinine had been improving, worsened yesterday and unchanged today at 2 86      Initial presentation with sepsis and septic shock - presumed prerenal etiology with creatinine improving with IV fluids  Patient with concurrent worsening anemia and hypoalbuminemia as well  Initial FENA 0% indicating prerenal etiology  Patient also has a history of right-sided and potentially diastolic CHF, and appears to be volume overloaded, with evidence of vascular congestion and IVC engorgement by CT, weight gain, and net positive fluid balance  Patient is overall volume overloaded while likely intravascularly depleted  · Remains on diuresis with worsening renal function today  · Remains off IV fluids  · Attempts at bladder scan unsuccessful due to pain - patient with overlying panniculitis  · Renal ultrasound without evidence of hydronephrosis  · Plan is for discontinuation of albumin and octreotide, continuation of Lasix gtt, and close monitoring of renal function  · Continue to avoid nephrotoxins, and renally dose medications when appropriate  · Nephrology officially consulted - input appreciated  Chronic anemia  Assessment & Plan  Known past medical history in the setting of CKD and cirrhosis, as well as 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, and on chronic AC with warfarin  Baseline Hgb of 7-8  · Worsening anemia in the setting of acute illness  · FOBT negative X 2    · Has required transfusion with PRBCs, currently s/p 3 Units  · Continue to trend H/H - current stable at 7 8  · Iron studies with low TIBC and elevated ferritin, consistent with anemia of chronic disease  Atrial fibrillation [facility])  Assessment & Plan  History of AFib, presented with RVR in the setting of sepsis  Chronically on metoprolol succinate twice daily and AC with warfarin  · Required Cardizem gtt for rate control initially - currently off  · Reintroduced metoprolol with tartrate formulation previously - increased dose again today    · Continue to hold warfarin in the setting of worsening anemia and previously supratherapeutic INR   · INR remains therapeutic  Continue to monitor daily  · Continue to monitor heart rate with cardiopulmonary monitoring  Hyponatremia  Assessment & Plan  Baseline in the 130-140s range, presented with a value of 127 along with concurrent hypochloremia  In the setting of sepsis thought secondary to hypovolemia, but patient appears volume overloaded currently with sodium unchanged despite IV fluids  The current levels improving with diuresis and improved to 131  Continue to monitor  Pleural effusion, right  Assessment & Plan  History of a chronic, partially loculated right pleural effusion with associated atelectasis at the right lung base  He was admitted from 05/26 through 06/09/2021 for this, and transferred to [facility] - treated with chest tube with infusion of tPA  Repeat scan on 05/30 revealed a loculated right hydropneumothorax, after which he underwent a right-sided thoracoscopic decortication 06/02/2021  He was scheduled for thoracentesis via IR on 09/14 - given clinical picture decided to hold off on thoracentesis  CT of the chest shows a large chronic right pleural effusion with probable partial loculations  Will continue to hold off and monitor, and if sepsis does not resolve consider a diagnostic thoracentesis  Chronic right-sided congestive heart failure [facility])  Assessment & Plan  Last ECHO 02/2021 with preserved LVEF, mildly increased LV wall thickness, and mildly reduced RV systolic function  · CT 9/13 with vascular congestion, IVC engorgement consistent with cardiac congestion  · Patient demonstrating weight gain and net positive fluid status  · Has received significant IV fluids in the setting of septic shock, which were discontinued 9/15  · Also with hypoalbuminemia which is contributing to worsening lower extremity edema  · Overall appears volume overloaded, but also with concurrent MARISABEL and low FENA    · Home diuretic therapy in the form of 80 mg furosemide BID has been on hold since admission  · Was initiated on IV furosemide 60 mg BID without adequate diuresis  · Initiated on Lasix gtt 9/16 by Nephrology  · Continue to monitor daily weights, I/O's - baseline weight 300 lb - currently at 349 lb, -1 7 L overnight  Hypoalbuminemia  Assessment & Plan  In the setting of cirrhosis  Given patient's septic shock, hypotension, and concurrent low albumin - has received albumin infusions  Chronic venous insufficiency  Assessment & Plan  Chronic venous insufficiency with concurrent hypoalbuminemia, cirrhosis, and right-sided CHF  Patient has also been fluid resuscitated, with worsening lower extremity edema  Type 2 diabetes mellitus with diabetic chronic kidney disease [facility])  Assessment & Plan  Lab Results   Component Value Date    HGBA1C 5 5 05/21/2021     Recent Labs     09/16/21  0806 09/16/21  1148 09/16/21  1616 09/17/21  0734   POCGLU 156* 155* 170* 148*     Does not appear to be in any glycemic agents as an outpatient, with A1c in the normal range  Continue monitor blood sugars  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 ascites ([facility] [address] )  Assessment & Plan  Known past medical history secondary to alcoholic and fatty liver disease  · Follows outpatient with Gastroenterology; last seen on 08/04/2021  · Not a candidate for liver transplant given BMI and comorbidities  · Avoid hepatotoxic medications  · Serial lab monitoring    STEFF (obstructive sleep apnea)  Assessment & Plan  Known past medical history with CPAP compliance at home  Continue CPAP HS per home settings  NSVT (nonsustained ventricular tachycardia) ([facility])  Assessment & Plan  Brief run of nonsustained V-tach on cardiopulmonary monitoring on 09/15 late in the afternoon  Patient's EF approximately 6 months ago was preserved  Has remained in AFib and with sepsis, original septic shock  Was also maintained on Levophed and Cardizem gtt  Patient was asymptomatic and episode was very brief    Continue to monitor renal function and electrolytes carefully, and aim for potassium greater than 4 and magnesium greater than 2  Continue cardiopulmonary monitoring  VTE Pharmacologic Prophylaxis:   Pharmacologic: Warfarin (Coumadin) - currently on hold  Mechanical VTE Prophylaxis in Place: Yes     Patient Centered Rounds: I have performed bedside rounds with nursing staff today      Discussions with Specialists or Other Care Team Provider:  Pulmonology     Education and Discussions with Family / Patient:  Yes     Time Spent for Care: 45 minutes   More than 50% of total time spent on counseling and coordination of care as described above      Current Length of Stay: 3 day(s)     Current Patient Status: Inpatient   Certification Statement: The patient will continue to require additional inpatient hospital stay due to Continue treatment of sepsis, MARISABEL, AFib with RVR, and anemia      Discharge Plan:  TBD     Code Status: Level 1 - Full Code    Subjective:   Patient seen and examined - continues to report improvement in overall symptoms  Successfully diuresed approximately 1 7 L overnight  Renal function mildly worse this morning  No overnight events reported  Remains afebrile  Objective:     Vitals:   Temp (24hrs), Av 6 °F (36 4 °C), Min:97 4 °F (36 3 °C), Max:97 9 °F (36 6 °C)    Temp:  [97 4 °F (36 3 °C)-97 9 °F (36 6 °C)] 97 5 °F (36 4 °C)  HR:  [] 102  Resp:  [14-40] 29  BP: ()/(61-76) 104/71  SpO2:  [88 %-99 %] 93 %  Body mass index is 47 42 kg/m²  Input and Output Summary (last 24 hours): Intake/Output Summary (Last 24 hours) at 2021 1103  Last data filed at 2021 1001  Gross per 24 hour   Intake 990 ml   Output 2075 ml   Net -1085 ml       Physical Exam:   Vitals and nursing note reviewed  Constitutional:       General: He is not in acute distress      Appearance: He is well-developed  He is obese  He is ill-appearing   He is not toxic-appearing       Comments: Chronically ill-appearing    Cardiovascular:      Rate and Rhythm: Tachycardia present  Rhythm irregular       Heart sounds: No murmur heard  Pulmonary:      Effort: Pulmonary effort is normal  No respiratory distress       Breath sounds: Rales present  No rhonchi       Comments: Decreased right basilar breath sounds, mild bibasilar crackles  Abdominal:      General: There is distension       Palpations: Abdomen is soft       Tenderness: There is no abdominal tenderness  There is no guarding or rebound  Musculoskeletal:         General: Swelling present  No tenderness       Cervical back: Neck supple       Comments: Significant bilateral acute on chronic appearing lower extremity edema   Changes consistent with chronic venous stasis    Skin:     General: Skin is warm and dry       Comments: Erythema and warmth overlying lower ventral pannus appear significantly improved since prior exam and nearly resolved    Neurological:      General: No focal deficit present       Mental Status: He is alert and oriented to person, place, and time     Psychiatric:         Mood and Affect: Mood normal          Behavior: Behavior normal      Additional Data:     Labs:    Results from last 7 days   Lab Units 09/17/21  0902 09/17/21  0436 09/16/21  0452   WBC Thousand/uL  --  5 76 7 61   HEMOGLOBIN g/dL 7 8* 7 4* 7 7*   HEMATOCRIT % 25 8* 24 6* 25 4*   PLATELETS Thousands/uL  --  209 224   BANDS PCT %  --  2  --    NEUTROS PCT %  --   --  81*   LYMPHS PCT %  --   --  6*   LYMPHO PCT %  --  11*  --    MONOS PCT %  --   --  8   MONO PCT %  --  6  --    EOS PCT %  --  0 2     Results from last 7 days   Lab Units 09/17/21  0436   SODIUM mmol/L 131*   POTASSIUM mmol/L 4 0   CHLORIDE mmol/L 93*   CO2 mmol/L 28   BUN mg/dL 63*   CREATININE mg/dL 2 68*   ANION GAP mmol/L 10   CALCIUM mg/dL 8 1*   ALBUMIN g/dL 3 0*   TOTAL BILIRUBIN mg/dL 0 86   ALK PHOS U/L 152*   ALT U/L 15   AST U/L 11   GLUCOSE RANDOM mg/dL 122     Results from last 7 days   Lab Units 09/17/21  0436   INR  2 63*     Results from last 7 days   Lab Units 09/17/21  0734 09/16/21  1616 09/16/21  1148 09/16/21  0806 09/15/21  1726 09/15/21  1228 09/14/21  1917 09/14/21  1253 09/14/21  0959 09/13/21  1807 09/13/21  1550   POC GLUCOSE mg/dl 148* 170* 155* 156* 184* 176* 195* 234* 216* 177* 122         Results from last 7 days   Lab Units 09/16/21  0452 09/15/21  1137 09/14/21  0547 09/13/21  1100   LACTIC ACID mmol/L  --   --   --  1 7   PROCALCITONIN ng/ml 7 42* 8 77* 7 87* 3 89*           * I Have Reviewed All Lab Data Listed Above  * Additional Pertinent Lab Tests Reviewed: All Labs Within Last 24 Hours Reviewed     Recent Cultures (last 7 days):     Results from last 7 days   Lab Units 09/13/21  1100   BLOOD CULTURE  No Growth at 72 hrs  No Growth at 72 hrs  Last 24 Hours Medication List:   Current Facility-Administered Medications   Medication Dose Route Frequency Provider Last Rate    [START ON 9/18/2021] cefepime  1,000 mg Intravenous Q24H Leanna Li MD      furosemide  10 mg/hr Intravenous Continuous Zada Frames, CRNP 10 mg/hr (09/17/21 0857)    HYDROmorphone  0 5 mg Intravenous Q6H PRN Leanna Li MD      metoprolol tartrate  50 mg Oral Q12H [facility] Leanna Li MD      midodrine  7 5 mg Oral TID AC Zada Frames, CRNP      nicotine  1 patch Transdermal Daily Leanna Li MD      ondansetron  4 mg Intravenous Q6H PRN Leanna Li MD      pantoprazole  40 mg Oral Early Morning Leanna Li MD      polyethylene glycol  17 g Oral Daily PRN MD Dariel Oconnor [START ON 9/19/2021] vancomycin  1,250 mg Intravenous Q48H Shey Vargas MD          Today, Patient Was Seen By: Leanna Li MD    ** Please Note: Dictation voice to text software may have been used in the creation of this document   **

## 2021-09-17 NOTE — ASSESSMENT & PLAN NOTE
History of a chronic, partially loculated right pleural effusion with associated atelectasis at the right lung base  He was admitted from 05/26 through 06/09/2021 for this, and transferred to [facility] - treated with chest tube with infusion of tPA  Repeat scan on 05/30 revealed a loculated right hydropneumothorax, after which he underwent a right-sided thoracoscopic decortication 06/02/2021  He was scheduled for thoracentesis via IR on 09/14 - given clinical picture decided to hold off on thoracentesis  CT of the chest shows a large chronic right pleural effusion with probable partial loculations  Will continue to hold off and monitor, and if sepsis does not resolve consider a diagnostic thoracentesis

## 2021-09-17 NOTE — ASSESSMENT & PLAN NOTE
Baseline in the 130-140s range, presented with a value of 127 along with concurrent hypochloremia  In the setting of sepsis thought secondary to hypovolemia, but patient appears volume overloaded currently with sodium unchanged despite IV fluids  The current levels improving with diuresis and improved to 131  Continue to monitor

## 2021-09-17 NOTE — PROGRESS NOTES
Progress Note - Pulmonary   Chuckie Christianson 57 y o  male MRN: [id number]  Unit/Bed#:  Encounter: [id number]    Assessment/Plan:  Acute hypoxic respiratory failure/ septic shock/ Loculated right effusion/ Afib on warfarin/ MARISABEL on CKD/ Acute on chronic diastolic heart failure  -Off oxygen check VBG through CVC prior to removal to assess role of iontotrope to help patient diurese  -Continue antibiotics, no further intervention for effusion  -will follow as needed    Chief Complaint:   No problems    Subjective:   Patient denies and shortness of breath or cough, he is urinating frequently    Objective:     Vitals: Blood pressure 104/71, pulse 102, temperature 97 5 °F (36 4 °C), temperature source Temporal, resp  rate (!) 29, height 6' (1 829 m), weight (!) 159 kg (349 lb 10 4 oz), SpO2 93 %  ,Body mass index is 47 42 kg/m²        Intake/Output Summary (Last 24 hours) at 9/17/2021 1052  Last data filed at 9/17/2021 1001  Gross per 24 hour   Intake 990 ml   Output 2225 ml   Net -1235 ml       Invasive Devices     Central Venous Catheter Line            CVC Central Lines 09/14/21 Triple 20cm 3 days          Peripheral Intravenous Line            Peripheral IV 09/13/21 Right Antecubital 4 days    Peripheral IV 09/13/21 Right Wrist 4 days                Physical Exam: /71 (BP Location: Left arm)   Pulse 102   Temp 97 5 °F (36 4 °C) (Temporal)   Resp (!) 29   Ht 6' (1 829 m)   Wt (!) 159 kg (349 lb 10 4 oz)   SpO2 93%   BMI 47 42 kg/m²   General appearance: alert and oriented, in no acute distress  Neck: no adenopathy, no carotid bruit, no JVD, supple, symmetrical, trachea midline and thyroid not enlarged, symmetric, no tenderness/mass/nodules  Lungs: diminished breath sounds  Chest wall: no tenderness  Heart: regular rate and rhythm, S1, S2 normal, no murmur, click, rub or gallop  Extremities: edema pitting up to thighs  Skin: Skin color, texture, turgor normal  No rashes or lesions  Neurologic: Grossly normal     Labs:   CBC:   Lab Results   Component Value Date    WBC 5 76 09/17/2021    HGB 7 8 (L) 09/17/2021    HCT 25 8 (L) 09/17/2021    MCV 95 09/17/2021     09/17/2021    MCH 28 6 09/17/2021    MCHC 30 1 (L) 09/17/2021    RDW 20 2 (H) 09/17/2021    MPV 10 1 09/17/2021   , CMP:   Lab Results   Component Value Date    SODIUM 131 (L) 09/17/2021    K 4 0 09/17/2021    CL 93 (L) 09/17/2021    CO2 28 09/17/2021    BUN 63 (H) 09/17/2021    CREATININE 2 68 (H) 09/17/2021    CALCIUM 8 1 (L) 09/17/2021    AST 11 09/17/2021    ALT 15 09/17/2021    ALKPHOS 152 (H) 09/17/2021    EGFR 24 09/17/2021     Imaging and other studies: I have personally reviewed pertinent films in PACS

## 2021-09-17 NOTE — ASSESSMENT & PLAN NOTE
Last ECHO 02/2021 with preserved LVEF, mildly increased LV wall thickness, and mildly reduced RV systolic function  · CT 9/13 with vascular congestion, IVC engorgement consistent with cardiac congestion  · Patient demonstrating weight gain and net positive fluid status  · Has received significant IV fluids in the setting of septic shock, which were discontinued 9/15  · Also with hypoalbuminemia which is contributing to worsening lower extremity edema  · Overall appears volume overloaded, but also with concurrent MARISABEL and low FENA  · Home diuretic therapy in the form of 80 mg furosemide BID has been on hold since admission  · Was initiated on IV furosemide 60 mg BID without adequate diuresis  · Initiated on Lasix gtt 9/16 by Nephrology  · Continue to monitor daily weights, I/O's - baseline weight 300 lb - currently at 349 lb, -1 7 L overnight

## 2021-09-17 NOTE — PROGRESS NOTES
NEPHROLOGY PROGRESS NOTE   Quiana Loving 57 y o  male MRN: [id number]  Unit/Bed#:  Encounter: [id number]    Assessment/Plan:    Quiana Loving is a 57 y o  male whose pertinent medical problems include CKD 3, chronic diastolic congestive heart failure, atrial fibrillation, cirrhosis, obesity admitted 9/13 with chief complaint of abdominal pain id being treated for septic shock (shock resolved/POA) secondary to panniculitis  Renal following along for acute kidney injury, volume status, electrolytes  Plan outlined below       1  Acute kidney injury (POA) atop chronic kidney disease  · Renal function minimally worse with eGFR decline from 26 mil/min-> 24 mil/min  Electrolytes, acid-base, blood pressure acceptable  Urine output 1 7 liters in last 24 hours which is improvement  · Suspect etiology of worsening MARISABEL is ATN in setting of septic shock/arrhythmia/vancomyocin nephrotoxicity with secondary rise in creatinine due to cardiorenal syndrome  · Plan: discontinue octreotide and albumin  Continue lasix infusion @ 10 mil/min and increase for goal negative 2 liters in 24 hours  Widen fluid restriction to 1 5 liters/day  Continue midodrine and wean as able  Avoid potential nephrotoxins  2  Stage 3 chronic kidney disease with baseline creatinine around 1 2 mg/dL  3  Acute on chronic diastolic congestive heart failure, suspected cardiorenal syndrome type 1  ? Previous dry weight around 300 pounds  He is significantly over this today  Will continue lasix infusion as above  4  Atrial fibrillation with rapid ventricular response  ? Rate controlled at this time, management per primary team  5  Hypervolemic hyponatremia  ? Widen fluid restriction to 1 5 liters per day  6  Septic shock (shock resolved)  secondary to panniculitis  ? Receiving renally dosed vancomycin an cefepime  Random vancomycin level improved  Management per primary team   7  Unspecified cirrhosis of the liver with ascites, rule out HRS  ?  Doubt this is HRS  Discontinue albumin and octreotide as above  8  Recurrent right pleural effusion  · Per pulmonology, no intention to drain effusion     ROS  No physical complaints  States he feels improved  Wants a bed on the fourth floor  A complete 10 point review of systems have been performed and are otherwise negative  Historical Information   Past Medical History:   Diagnosis Date    A-fib [facility])     Acute renal failure superimposed on stage 3b chronic kidney disease ([facility] ) 03/15/2021    Cardiac disease     Chronic combined systolic (congestive) and diastolic (congestive) heart failure (HCC)     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 ascites (HCC)     Class 3 severe obesity due to excess calories with serious comorbidity and body mass index (BMI) of 45 0 to 49 9 in adult [facility]) 10/17/2018    Diabetes mellitus ([place] [id number] )     Dilated cardiomyopathy ([place] [address] )     History of echocardiogram 11/24/2014    EF 0 30 (30%), Likely mod LV systolic dysfunction  Likely RV dysfunction as well   History of Lexiscan MPI 02/19/2016    EF 0 43 (43%), no prior MI or ischemia   Hx of echocardiogram 04/28/2017    Normal EF, Normal LV systolic function  Mild concentric LV hypertrophy  Mild mitral and tricuspid regurg      Hx: recurrent pneumonia     Hypertension     Hypokalemia 09/04/2020    Long term (current) use of anticoagulants     Morbid (severe) obesity due to excess calories (HCC)     Neuropathy     Pleural effusion on right     Stage 3 chronic kidney disease [facility])      Past Surgical History:   Procedure Laterality Date    HERNIA REPAIR      IR CHEST TUBE CHECK/CHANGE/REPOSITION/UPSIZE  5/27/2021    IR CHEST TUBE PLACEMENT  5/22/2021    IR THORACENTESIS  8/25/2020    IR THORACENTESIS  2/23/2021    LUNG DECORTICATION Right 6/2/2021    Procedure: DECORTICATION LUNG;  Surgeon: Katherine Srivastava MD;  [facility];  Service: Thoracic    SPLENECTOMY, TOTAL      THORACOSCOPY VIDEO ASSISTED SURGERY (VATS) Right 6/2/2021    Procedure: THORACOSCOPY VIDEO ASSISTED SURGERY (VATS); Surgeon: Lore Boyd MD;  [facility];  Service: Thoracic    VASCULAR SURGERY Right     leg     Social History   Social History     Substance and Sexual Activity   Alcohol Use Yes    Comment: 4 or 5 drinks weekly/bloody chani's     Social History     Substance and Sexual Activity   Drug Use Never     Social History     Tobacco Use   Smoking Status Current Some Day Smoker    Packs/day: 0 25    Types: Cigarettes   Smokeless Tobacco Never Used   Tobacco Comment    smokes 1 pack per week and a half        Family History:   History reviewed  No pertinent family history      Medications:  Pertinent medications were reviewed  Current Facility-Administered Medications   Medication Dose Route Frequency Provider Last Rate    albumin human  25 g Intravenous Q8H TRES Wren Stopped (09/17/21 0406)    cefepime  2,000 mg Intravenous Q12H Alejo Torres MD Stopped (09/16/21 2215)    diltiazem  1-15 mg/hr Intravenous Titrated Alejo Torres MD Stopped (09/15/21 1132)    furosemide  10 mg/hr Intravenous Continuous ERNA WrenNP 10 mg/hr (09/16/21 1356)    HYDROmorphone  0 5 mg Intravenous Q6H PRN Alejo Torres MD      metoprolol tartrate  37 5 mg Oral Q12H [facility] & half-way Alejo Torres MD      midodrine  7 5 mg Oral TID AC TRES Wren      nicotine  1 patch Transdermal Daily Alejo Torres MD      octreotide  50 mcg Subcutaneous [facility] & half-way Ben Tolliver PA-C      ondansetron  4 mg Intravenous Q6H PRN Alejo Torres MD      pantoprazole  40 mg Oral Early Morning Alejo Torres MD      polyethylene glycol  17 g Oral Daily PRN Alejo Torres MD      [START ON 9/19/2021] vancomycin  1,250 mg Intravenous Q48H Lexis Merrill MD           No Known Allergies      Vitals:   BP 95/73 (BP Location: Left arm)   Pulse 99   Temp 97 5 °F (36 4 °C) (Temporal)   Resp 22   Ht 6' (1 829 m)   Wt (!) 159 kg (349 lb 10 4 oz)   SpO2 99%   BMI 47 42 kg/m²   Body mass index is 47 42 kg/m²  SpO2: 99 %,   SpO2 Activity: At Rest,   O2 Device: Nasal cannula      Intake/Output Summary (Last 24 hours) at 9/17/2021 0819  Last data filed at 9/17/2021 0801  Gross per 24 hour   Intake 870 ml   Output 1925 ml   Net -1055 ml     Invasive Devices     Central Venous Catheter Line            CVC Central Lines 09/14/21 Triple 20cm 3 days          Peripheral Intravenous Line            Peripheral IV 09/13/21 Right Antecubital 3 days    Peripheral IV 09/13/21 Right Wrist 3 days                Physical Exam  General: conscious, cooperative, in no acute distress  Eyes: conjunctivae pink, anicteric sclerae  ENT: lips and mucous membranes moist  Neck: supple, no JVD, no masses  Chest: fine bibasilar expiratory wheezes   CVS: S1 & S2, tachycardic rate, regular rhythm  Abdomen: soft, non-tender, distended, normoactive bowel sounds, abscess,   Extremities: severe edema of both legs  Skin: no rash  Neuro: awake, alert, oriented      Diagnostic Data:  Lab: I have personally reviewed pertinent lab results  ,   CBC:  Results from last 7 days   Lab Units 09/17/21  0436   WBC Thousand/uL 5 76   HEMOGLOBIN g/dL 7 4*   HEMATOCRIT % 24 6*   PLATELETS Thousands/uL 209      CMP:   Lab Results   Component Value Date    SODIUM 131 (L) 09/17/2021    K 4 0 09/17/2021    CL 93 (L) 09/17/2021    CO2 28 09/17/2021    BUN 63 (H) 09/17/2021    CREATININE 2 68 (H) 09/17/2021    CALCIUM 8 1 (L) 09/17/2021    AST 11 09/17/2021    ALT 15 09/17/2021    ALKPHOS 152 (H) 09/17/2021    EGFR 24 09/17/2021   ,   PT/INR:   Lab Results   Component Value Date    INR 2 63 (H) 09/17/2021   ,   Magnesium: No components found for: MAG,  Phosphorous: No results found for: PHOS    Microbiology:  @LABRCNTIP,(urinecx:7)@        Adarsh Spence, CRNP    Portions of the record may have been created with voice recognition software   Occasional wrong word or "sound a like" substitutions may have occurred due to the inherent limitations of voice recognition software  Read the chart carefully and recognize, using context, where substitutions have occurred

## 2021-09-18 NOTE — ASSESSMENT & PLAN NOTE
Lab Results   Component Value Date    HGBA1C 5 5 05/21/2021     Recent Labs     09/17/21  1605 09/17/21  2049 09/18/21  0801 09/18/21  1052   POCGLU 128 139 142* 134     Does not appear to be in any glycemic agents as an outpatient, with A1c in the normal range  Continue to monitor blood sugars

## 2021-09-18 NOTE — PLAN OF CARE
Problem: Potential for Falls  Goal: Patient will remain free of falls  Description: INTERVENTIONS:  - Educate patient/family on patient safety including physical limitations  - Instruct patient to call for assistance with activity   - Consult OT/PT to assist with strengthening/mobility   - Keep Call bell within reach  - Keep bed low and locked with side rails adjusted as appropriate  - Keep care items and personal belongings within reach  - Initiate and maintain comfort rounds  - Make Fall Risk Sign visible to staff  - Offer Toileting every 2 Hours, in advance of need  - Initiate/Maintain alarm  - Obtain necessary fall risk management equipment:   - Apply yellow socks and bracelet for high fall risk patients  - Consider moving patient to room near nurses station  Outcome: Progressing     Problem: MOBILITY - ADULT  Goal: Maintain or return to baseline ADL function  Description: INTERVENTIONS:  -  Assess patient's ability to carry out ADLs; assess patient's baseline for ADL function and identify physical deficits which impact ability to perform ADLs (bathing, care of mouth/teeth, toileting, grooming, dressing, etc )  - Assess/evaluate cause of self-care deficits   - Assess range of motion  - Assess patient's mobility; develop plan if impaired  - Assess patient's need for assistive devices and provide as appropriate  - Encourage maximum independence but intervene and supervise when necessary  - Involve family in performance of ADLs  - Assess for home care needs following discharge   - Consider OT consult to assist with ADL evaluation and planning for discharge  - Provide patient education as appropriate  Outcome: Progressing     Problem: CARDIOVASCULAR - ADULT  Goal: Maintains optimal cardiac output and hemodynamic stability  Description: INTERVENTIONS:  - Monitor I/O, vital signs and rhythm  - Monitor for S/S and trends of decreased cardiac output  - Administer and titrate ordered vasoactive medications to optimize hemodynamic stability  - Assess quality of pulses, skin color and temperature  - Assess for signs of decreased coronary artery perfusion  - Instruct patient to report change in severity of symptoms  Outcome: Progressing  Goal: Absence of cardiac dysrhythmias or at baseline rhythm  Description: INTERVENTIONS:  - Continuous cardiac monitoring, vital signs, obtain 12 lead EKG if ordered  - Administer antiarrhythmic and heart rate control medications as ordered  - Monitor electrolytes and administer replacement therapy as ordered  Outcome: Progressing     Problem: GASTROINTESTINAL - ADULT  Goal: Maintains or returns to baseline bowel function  Description: INTERVENTIONS:  - Assess bowel function  - Encourage oral fluids to ensure adequate hydration  - Administer IV fluids if ordered to ensure adequate hydration  - Administer ordered medications as needed  - Encourage mobilization and activity  - Consider nutritional services referral to assist patient with adequate nutrition and appropriate food choices  Outcome: Progressing  Goal: Maintains adequate nutritional intake  Description: INTERVENTIONS:  - Monitor percentage of each meal consumed  - Identify factors contributing to decreased intake, treat as appropriate  - Assist with meals as needed  - Monitor I&O, weight, and lab values if indicated  - Obtain nutrition services referral as needed  Outcome: Progressing  Goal: Establish and maintain optimal ostomy function  Description: INTERVENTIONS:  - Assess bowel function  - Encourage oral fluids to ensure adequate hydration  - Administer IV fluids if ordered to ensure adequate hydration   - Administer ordered medications as needed  - Encourage mobilization and activity  - Nutrition services referral to assist patient with appropriate food choices  - Assess stoma site  - Consider wound care consult   Outcome: Completed     Problem: GENITOURINARY - ADULT  Goal: Maintains or returns to baseline urinary function  Description: INTERVENTIONS:  - Assess urinary function  - Encourage oral fluids to ensure adequate hydration if ordered  - Administer IV fluids as ordered to ensure adequate hydration  - Administer ordered medications as needed  - Offer frequent toileting  - Follow urinary retention protocol if ordered  Outcome: Progressing     Problem: METABOLIC, FLUID AND ELECTROLYTES - ADULT  Goal: Electrolytes maintained within normal limits  Description: INTERVENTIONS:  - Monitor labs and assess patient for signs and symptoms of electrolyte imbalances  - Administer electrolyte replacement as ordered  - Monitor response to electrolyte replacements, including repeat lab results as appropriate  - Instruct patient on fluid and nutrition as appropriate  Outcome: Progressing  Goal: Fluid balance maintained  Description: INTERVENTIONS:  - Monitor labs   - Monitor I/O and WT  - Instruct patient on fluid and nutrition as appropriate  - Assess for signs & symptoms of volume excess or deficit  Outcome: Progressing  Goal: Glucose maintained within target range  Description: INTERVENTIONS:  - Monitor Blood Glucose as ordered  - Assess for signs and symptoms of hyperglycemia and hypoglycemia  - Administer ordered medications to maintain glucose within target range  - Assess nutritional intake and initiate nutrition service referral as needed  Outcome: Progressing     Problem: SKIN/TISSUE INTEGRITY - ADULT  Goal: Skin Integrity remains intact(Skin Breakdown Prevention)  Description: Assess:  -Perform Danyel assessment every day  -Clean and moisturize skin every day  -Inspect skin when repositioning, toileting, and assisting with ADLS  -Assess under medical devices every day  -Assess extremities for adequate circulation and sensation     Bed Management:  -Have minimal linens on bed & keep smooth, unwrinkled  -Change linens as needed when moist or perspiring  -Avoid sitting or lying in one position for more than 2 hours while in bed  -Keep HOB at 30 degrees     Toileting:  -Offer bedside commode  -Assess for incontinence every 2 hours    Activity:  -Encourage activity and walks on unit  -Encourage or provide ROM exercises   -Turn and reposition patient every 2 Hours  -Use appropriate equipment to lift or move patient in bed  -Instruct/ Assist with weight shifting every 30 min when out of bed in chair    Skin Care:  -Avoid use of baby powder, tape, friction and shearing, hot water or constrictive clothing  -Do not massage red bony areas    Outcome: Progressing     Problem: HEMATOLOGIC - ADULT  Goal: Maintains hematologic stability  Description: INTERVENTIONS  - Assess for signs and symptoms of bleeding or hemorrhage  - Monitor labs  - Administer supportive blood products/factors as ordered and appropriate  Outcome: Progressing     Problem: MUSCULOSKELETAL - ADULT  Goal: Maintain or return mobility to safest level of function  Description: INTERVENTIONS:  - Assess patient's ability to carry out ADLs; assess patient's baseline for ADL function and identify physical deficits which impact ability to perform ADLs (bathing, care of mouth/teeth, toileting, grooming, dressing, etc )  - Assess/evaluate cause of self-care deficits   - Assess range of motion  - Assess patient's mobility  - Assess patient's need for assistive devices and provide as appropriate  - Encourage maximum independence but intervene and supervise when necessary  - Involve family in performance of ADLs  - Assess for home care needs following discharge   - Consider OT consult to assist with ADL evaluation and planning for discharge  - Provide patient education as appropriate  Outcome: Progressing  Goal: Maintain proper alignment of affected body part  Description: INTERVENTIONS:  - Support, maintain and protect limb and body alignment  - Provide patient/ family with appropriate education  Outcome: Progressing     Problem: PAIN - ADULT  Goal: Verbalizes/displays adequate comfort level or baseline comfort level  Description: Interventions:  - Encourage patient to monitor pain and request assistance  - Assess pain using appropriate pain scale  - Administer analgesics based on type and severity of pain and evaluate response  - Implement non-pharmacological measures as appropriate and evaluate response  - Consider cultural and social influences on pain and pain management  - Notify physician/advanced practitioner if interventions unsuccessful or patient reports new pain  Outcome: Progressing     Problem: INFECTION - ADULT  Goal: Absence or prevention of progression during hospitalization  Description: INTERVENTIONS:  - Assess and monitor for signs and symptoms of infection  - Monitor lab/diagnostic results  - Monitor all insertion sites, i e  indwelling lines, tubes, and drains  - Monitor endotracheal if appropriate and nasal secretions for changes in amount and color  - [place] appropriate cooling/warming therapies per order  - Administer medications as ordered  - Instruct and encourage patient and family to use good hand hygiene technique  - Identify and instruct in appropriate isolation precautions for identified infection/condition  Outcome: Progressing     Problem: SAFETY ADULT  Goal: Patient will remain free of falls  Description: INTERVENTIONS:  - Educate patient/family on patient safety including physical limitations  - Instruct patient to call for assistance with activity   - Consult OT/PT to assist with strengthening/mobility   - Keep Call bell within reach  - Keep bed low and locked with side rails adjusted as appropriate  - Keep care items and personal belongings within reach  - Initiate and maintain comfort rounds  - Make Fall Risk Sign visible to staff  - Offer Toileting every 2 Hours, in advance of need  - Obtain necessary fall risk management equipment:   Apply yellow socks and bracelet for high fall risk patients  - Consider moving patient to room near nurses station  Outcome: Progressing  Goal: Maintain or return to baseline ADL function  Description: INTERVENTIONS:  -  Assess patient's ability to carry out ADLs; assess patient's baseline for ADL function and identify physical deficits which impact ability to perform ADLs (bathing, care of mouth/teeth, toileting, grooming, dressing, etc )  - Assess/evaluate cause of self-care deficits   - Assess range of motion  - Assess patient's mobility; develop plan if impaired  - Assess patient's need for assistive devices and provide as appropriate  - Encourage maximum independence but intervene and supervise when necessary  - Involve family in performance of ADLs  - Assess for home care needs following discharge   - Consider OT consult to assist with ADL evaluation and planning for discharge  - Provide patient education as appropriate  Outcome: Progressing    Problem: Prexisting or High Potential for Compromised Skin Integrity  Goal: Skin integrity is maintained or improved  Description: INTERVENTIONS:  - Identify patients at risk for skin breakdown  - Assess and monitor skin integrity  - Assess and monitor nutrition and hydration status  - Monitor labs   - Assess for incontinence   - Turn and reposition patient  - Assist with mobility/ambulation  - Relieve pressure over bony prominences  - Avoid friction and shearing  - Provide appropriate hygiene as needed including keeping skin clean and dry  - Evaluate need for skin moisturizer/barrier cream  - Collaborate with interdisciplinary team   - Patient/family teaching  - Consider wound care consult   Outcome: Progressing     Problem: Nutrition/Hydration-ADULT  Goal: Nutrient/Hydration intake appropriate for improving, restoring or maintaining nutritional needs  Description: Monitor and assess patient's nutrition/hydration status for malnutrition  Collaborate with interdisciplinary team and initiate plan and interventions as ordered    Monitor patient's weight and dietary intake as ordered or per policy  Utilize nutrition screening tool and intervene as necessary  Determine patient's food preferences and provide high-protein, high-caloric foods as appropriate       INTERVENTIONS:  - Monitor oral intake, urinary output, labs, and treatment plans  - Assess nutrition and hydration status and recommend course of action  - Evaluate amount of meals eaten  - Assist patient with eating if necessary   - Allow adequate time for meals  - Recommend/ encourage appropriate diets, oral nutritional supplements, and vitamin/mineral supplements  - Order, calculate, and assess calorie counts as needed  - Recommend, monitor, and adjust tube feedings and TPN/PPN based on assessed needs  - Assess need for intravenous fluids  - Provide specific nutrition/hydration education as appropriate  - Include patient/family/caregiver in decisions related to nutrition  Outcome: Progressing

## 2021-09-18 NOTE — ASSESSMENT & PLAN NOTE
History of AFib, presented with RVR in the setting of sepsis  Chronically on metoprolol succinate twice daily and AC with warfarin  · Required Cardizem gtt for rate control initially - weaned off  · Reintroduced metoprolol with tartrate formulation previously - increased dose again 9/17  · Warfarin was held in the setting of worsening anemia and previously supratherapeutic INR  · Resume warfarin today as INR is 2 09, with stable hemoglobin  · INR remains therapeutic  Continue to monitor daily  · Continue to monitor heart rate with cardiopulmonary monitoring

## 2021-09-18 NOTE — ASSESSMENT & PLAN NOTE
Last ECHO 02/2021 with preserved LVEF, mildly increased LV wall thickness, and mildly reduced RV systolic function  · CT 9/13 with vascular congestion, IVC engorgement consistent with cardiac congestion  · Patient demonstrating weight gain and net positive fluid status  · Has received significant IV fluids in the setting of septic shock, which were discontinued 9/15  · Also with hypoalbuminemia which is contributing to worsening lower extremity edema  · Overall appears volume overloaded, but also with concurrent MARISABEL and low FENA  · Home diuretic therapy in the form of 80 mg furosemide BID has been on hold since admission  · Was initiated on IV furosemide 60 mg BID without adequate diuresis  · Initiated on Lasix gtt 9/16 by Nephrology  · Continue to monitor daily weights, I/O's - baseline weight 300 lb - increased to 349 lb, currently at 339 lb

## 2021-09-18 NOTE — ASSESSMENT & PLAN NOTE
Presented with lower abdominal pain onset on the night before admission  Sepsis criteria present with tachycardia, tachypnea, hypotension, leukocytosis, and fever, with associated organ dysfunction with evidence of MARISABEL  Source presumed to be skin and soft tissue in patient with evidence of panniculitis  Sepsis has resolved  · CT with dermal thickening and subcutaneous edema in the ventral lower pannus without collection, soft tissue gas, or inflammatory changes extending into the perineum  · Continue broad-spectrum coverage with vancomycin and cefepime, day #5  · Initially on IV fluids - discontinued  · Levophed successfully weaned off  · Right-sided IJ TLC remains in place due to poor access   Blood cultures with no growth X 4 days   Procalcitonin elevated 3 89 --> 7 87, 8 77, 7 42 this morning  Initial elevating procalcitonin potentially on the basis of worsening renal function   Was initiated on midodrine, octreotide, and additional albumin by Critical Care previously  Octreotide and albumin discontinued by Nephrology 9/17   Critical care and nephrology consulted and following

## 2021-09-18 NOTE — ASSESSMENT & PLAN NOTE
Baseline in the 130-140s range, presented with a value of 127 along with concurrent hypochloremia  In the setting of sepsis thought secondary to hypovolemia, but patient appears volume overloaded currently with sodium unchanged despite IV fluids  The current levels improving with diuresis and improved to 133  Low-sodium is very likely due to hypervolemic hyponatremia

## 2021-09-18 NOTE — PROGRESS NOTES
[address] [facility] [address]  Progress Note - Akosua Slight 1960, 57 y o  male MRN: [id number]  Unit/Bed#:  Encounter: [id number]  Primary Care Provider: Missy Wolfe DO   Date and time admitted to hospital: 9/13/2021  9:51 AM    * Septic shock [facility])  Assessment & Plan  Presented with lower abdominal pain onset on the night before admission  Sepsis criteria present with tachycardia, tachypnea, hypotension, leukocytosis, and fever, with associated organ dysfunction with evidence of MARISABEL  Source presumed to be skin and soft tissue in patient with evidence of panniculitis  Sepsis has resolved  · CT with dermal thickening and subcutaneous edema in the ventral lower pannus without collection, soft tissue gas, or inflammatory changes extending into the perineum  · Continue broad-spectrum coverage with vancomycin and cefepime, day #5  · Initially on IV fluids - discontinued  · Levophed successfully weaned off  · Right-sided IJ TLC remains in place due to poor access   Blood cultures with no growth X 4 days   Procalcitonin elevated 3 89 --> 7 87, 8 77, 7 42 this morning  Initial elevating procalcitonin potentially on the basis of worsening renal function   Was initiated on midodrine, octreotide, and additional albumin by Critical Care previously  Octreotide and albumin discontinued by Nephrology 9/17   Critical care and nephrology consulted and following  Panniculitis  Assessment & Plan  Treatment as above  Appearance has significantly improved, and sepsis has resolved      Acute kidney injury superimposed on chronic kidney disease [facility])  Assessment & Plan  Lab Results   Component Value Date    EGFR 28 09/18/2021    EGFR 24 09/17/2021    EGFR 26 09/16/2021    CREATININE 2 40 (H) 09/18/2021    CREATININE 2 68 (H) 09/17/2021    CREATININE 2 54 (H) 09/16/2021   · Present on admission with a creatinine of 2 06, with baseline of 1 2 -1 4 - creatinine had been improving, worsened previously to a value of 2 86  Initial presentation with sepsis and septic shock - presumed prerenal etiology with creatinine improving with IV fluids  Patient with concurrent worsening anemia and hypoalbuminemia as well  Patient also has a history of right-sided and potentially diastolic CHF, and appears to be volume overloaded, with evidence of vascular congestion and IVC engorgement by CT, weight gain, and net positive fluid balance  Patient is overall volume overloaded - creatinine improved to 2 4 this morning with aggressive diuresis  · Remains off IV fluids  · Attempts at bladder scan unsuccessful due to pain - patient with overlying panniculitis  · Renal ultrasound without evidence of hydronephrosis  · Remains on Lasix gtt - weight down from 159 kg --> 154 kg  · Continue to avoid nephrotoxins, and renally dose medications when appropriate  · Nephrology officially consulted and following  Chronic anemia  Assessment & Plan  Known past medical history in the setting of CKD and cirrhosis, as well as 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, and on chronic AC with warfarin  Baseline Hgb of 7-8  · Worsening anemia in the setting of acute illness  · FOBT negative X 2    · Has required transfusion with PRBCs, currently s/p 3 Units  · Continue to trend H/H - current stable at 7 8  · Iron studies with low TIBC and elevated ferritin, consistent with anemia of chronic disease  Atrial fibrillation [facility])  Assessment & Plan  History of AFib, presented with RVR in the setting of sepsis  Chronically on metoprolol succinate twice daily and AC with warfarin  · Required Cardizem gtt for rate control initially - weaned off  · Reintroduced metoprolol with tartrate formulation previously - increased dose again 9/17  · Warfarin was held in the setting of worsening anemia and previously supratherapeutic INR  · Resume warfarin today as INR is 2 09, with stable hemoglobin    · INR remains therapeutic  Continue to monitor daily  · Continue to monitor heart rate with cardiopulmonary monitoring  Hyponatremia  Assessment & Plan  Baseline in the 130-140s range, presented with a value of 127 along with concurrent hypochloremia  In the setting of sepsis thought secondary to hypovolemia, but patient appears volume overloaded currently with sodium unchanged despite IV fluids  The current levels improving with diuresis and improved to 133  Low-sodium is very likely due to hypervolemic hyponatremia  Pleural effusion, right  Assessment & Plan  History of a chronic, partially loculated right pleural effusion with associated atelectasis at the right lung base  He was admitted from 05/26 through 06/09/2021 for this, and transferred to [facility] - treated with chest tube with infusion of tPA  Repeat scan on 05/30 revealed a loculated right hydropneumothorax, after which he underwent a right-sided thoracoscopic decortication 06/02/2021  He was scheduled for thoracentesis via IR on 09/14 - given clinical picture decided to hold off on thoracentesis  CT of the chest shows a large chronic right pleural effusion with probable partial loculations  Will continue to hold off repeat tap and monitor  Chronic right-sided congestive heart failure [facility])  Assessment & Plan  Last ECHO 02/2021 with preserved LVEF, mildly increased LV wall thickness, and mildly reduced RV systolic function  · CT 9/13 with vascular congestion, IVC engorgement consistent with cardiac congestion  · Patient demonstrating weight gain and net positive fluid status  · Has received significant IV fluids in the setting of septic shock, which were discontinued 9/15  · Also with hypoalbuminemia which is contributing to worsening lower extremity edema  · Overall appears volume overloaded, but also with concurrent MARISABEL and low FENA  · Home diuretic therapy in the form of 80 mg furosemide BID has been on hold since admission    · Was initiated on IV furosemide 60 mg BID without adequate diuresis  · Initiated on Lasix gtt 9/16 by Nephrology  · Continue to monitor daily weights, I/O's - baseline weight 300 lb - increased to 349 lb, currently at 339 lb  Hypoalbuminemia  Assessment & Plan  In the setting of cirrhosis  Given patient's septic shock, hypotension, and concurrent low albumin - has received albumin infusions  Chronic venous insufficiency  Assessment & Plan  Chronic venous insufficiency with concurrent hypoalbuminemia, cirrhosis, and right-sided CHF  Patient has also been fluid resuscitated, with worsening lower extremity edema  Type 2 diabetes mellitus with diabetic chronic kidney disease [facility])  Assessment & Plan  Lab Results   Component Value Date    HGBA1C 5 5 05/21/2021     Recent Labs     09/17/21  1605 09/17/21  2049 09/18/21  0801 09/18/21  1052   POCGLU 128 139 142* 134     Does not appear to be in any glycemic agents as an outpatient, with A1c in the normal range  Continue to monitor blood sugars  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 ascites ([facility] [address] )  Assessment & Plan  Known past medical history secondary to alcoholic and fatty liver disease  · Follows outpatient with Gastroenterology; last seen on 08/04/2021  · Not a candidate for liver transplant given BMI and comorbidities  · Avoid hepatotoxic medications  · Serial lab monitoring    STEFF (obstructive sleep apnea)  Assessment & Plan  Known past medical history with CPAP compliance at home  Continue CPAP HS per home settings  NSVT (nonsustained ventricular tachycardia) ([facility])  Assessment & Plan  Brief run of nonsustained V-tach on cardiopulmonary monitoring on 09/15 late in the afternoon  Patient's EF approximately 6 months ago was preserved  Has remained in AFib and with sepsis, original septic shock  Was also maintained on Levophed and Cardizem gtt  Patient was asymptomatic and episode was very brief    Continue to monitor renal function and electrolytes carefully, and aim for potassium greater than 4 and magnesium greater than 2  Continue cardiopulmonary monitoring  VTE Pharmacologic Prophylaxis:   Pharmacologic: Warfarin (Coumadin) - currently on hold  Mechanical VTE Prophylaxis in Place: Yes     Patient Centered Rounds: I have performed bedside rounds with nursing staff today      Discussions with Specialists or Other Care Team Provider:  Pulmonology     Education and Discussions with Family / Patient:  Yes     Time Spent for Care: 45 minutes   More than 50% of total time spent on counseling and coordination of care as described above      Current Length of Stay: 4 day(s)     Current Patient Status: Inpatient   Certification Statement: The patient will continue to require additional inpatient hospital stay due to Continue treatment of sepsis, MARISABEL, and volume overload      Discharge Plan:  TBD     Code Status: Level 1 - Full Code    Subjective:   Patient seen and examined - reports feeling significantly improved  Continued successful diuresis, and improvement in renal function overnight  Sepsis remains resolved  No overnight events reported  Remains afebrile  Objective:     Vitals:   Temp (24hrs), Av 4 °F (36 3 °C), Min:97 °F (36 1 °C), Max:97 8 °F (36 6 °C)    Temp:  [97 °F (36 1 °C)-97 8 °F (36 6 °C)] 97 3 °F (36 3 °C)  HR:  [] 95  Resp:  [15-34] 19  BP: ()/(54-88) 102/65  SpO2:  [93 %-99 %] 99 %  Body mass index is 45 99 kg/m²  Input and Output Summary (last 24 hours): Intake/Output Summary (Last 24 hours) at 2021 1208  Last data filed at 2021 1158  Gross per 24 hour   Intake 644 06 ml   Output 4875 ml   Net -4230 94 ml       Physical Exam:   Vitals and nursing note reviewed  Constitutional:       General: He is not in acute distress      Appearance: He is well-developed  He is obese  He is ill-appearing   He is not toxic-appearing       Comments: Chronically ill-appearing    Cardiovascular:      Rate and Rhythm: Tachycardia present  Rhythm irregular       Heart sounds: No murmur heard  Pulmonary:      Effort: Pulmonary effort is normal  No respiratory distress       Breath sounds: Rales present  No rhonchi       Comments: Decreased right basilar breath sounds, mild bibasilar crackles  Abdominal:      General: There is distension       Palpations: Abdomen is soft       Tenderness: There is no abdominal tenderness  There is no guarding or rebound  Musculoskeletal:         General: Swelling present  No tenderness       Cervical back: Neck supple       Comments: Significant bilateral acute on chronic appearing lower extremity edema   Changes consistent with chronic venous stasis    Skin:     General: Skin is warm and dry       Comments: Erythema and warmth overlying lower ventral pannus appear significantly improved since prior exam and nearly resolved    Neurological:      General: No focal deficit present       Mental Status: He is alert and oriented to person, place, and time     Psychiatric:         Mood and Affect: Mood normal          Behavior: Behavior normal      Additional Data:     Labs:    Results from last 7 days   Lab Units 09/18/21  0553 09/17/21  0436 09/16/21  0452   WBC Thousand/uL 5 82 5 76 7 61   HEMOGLOBIN g/dL 7 8* 7 4* 7 7*   HEMATOCRIT % 25 9* 24 6* 25 4*   PLATELETS Thousands/uL 192 209 224   BANDS PCT %  --  2  --    NEUTROS PCT %  --   --  81*   LYMPHS PCT %  --   --  6*   LYMPHO PCT %  --  11*  --    MONOS PCT %  --   --  8   MONO PCT %  --  6  --    EOS PCT %  --  0 2     Results from last 7 days   Lab Units 09/18/21  0553   SODIUM mmol/L 133*   POTASSIUM mmol/L 3 1*   CHLORIDE mmol/L 94*   CO2 mmol/L 29   BUN mg/dL 69*   CREATININE mg/dL 2 40*   ANION GAP mmol/L 10   CALCIUM mg/dL 8 1*   ALBUMIN g/dL 2 7*   TOTAL BILIRUBIN mg/dL 0 72   ALK PHOS U/L 164*   ALT U/L 15   AST U/L 14   GLUCOSE RANDOM mg/dL 137     Results from last 7 days   Lab Units 09/18/21  0553   INR  2 09*     Results from last 7 days   Lab Units 09/18/21  1052 09/18/21  0801 09/17/21  2049 09/17/21  1605 09/17/21  1150 09/17/21  0734 09/16/21  1616 09/16/21  1148 09/16/21  0806 09/15/21  1726 09/15/21  1228 09/14/21  1917   POC GLUCOSE mg/dl 134 142* 139 128 166* 148* 170* 155* 156* 184* 176* 195*         Results from last 7 days   Lab Units 09/16/21  0452 09/15/21  1137 09/14/21  0547 09/13/21  1100   LACTIC ACID mmol/L  --   --   --  1 7   PROCALCITONIN ng/ml 7 42* 8 77* 7 87* 3 89*           * I Have Reviewed All Lab Data Listed Above  * Additional Pertinent Lab Tests Reviewed: All Labs Within Last 24 Hours Reviewed     Recent Cultures (last 7 days):     Results from last 7 days   Lab Units 09/13/21  1100   BLOOD CULTURE  No Growth After 4 Days  No Growth After 4 Days  Last 24 Hours Medication List:   Current Facility-Administered Medications   Medication Dose Route Frequency Provider Last Rate    cefepime  1,000 mg Intravenous Q24H Theodore Leblanc MD 1,000 mg (09/18/21 0857)    furosemide  10 mg/hr Intravenous Continuous TRES [place] 10 mg/hr (09/18/21 0000)    HYDROmorphone  0 5 mg Intravenous Q6H PRN Theodore Leblanc MD      metoprolol tartrate  50 mg Oral Q12H [address] Theodore Leblanc MD      midodrine  7 5 mg Oral TID AC TRES [place]      nicotine  1 patch Transdermal Daily Theodore Leblanc MD      ondansetron  4 mg Intravenous Q6H PRN Theodore Leblanc MD      pantoprazole  40 mg Oral Early Morning Theodore Leblanc MD      polyethylene glycol  17 g Oral Daily PRN Theodore Leblanc MD      potassium chloride  40 mEq Oral Once Theodore Leblanc MD     [facility] ON 9/19/2021] vancomycin  1,250 mg Intravenous Q48H Cleveland Yao MD      warfarin  2 5 mg Oral Every Other Martin Alston MD          Today, Patient Was Seen By: Theodore Leblanc MD    ** Please Note: Dictation voice to text software may have been used in the creation of this document   **

## 2021-09-18 NOTE — PROGRESS NOTES
Progress Note - Nephrology   Adrien Taveras 57 y o  male MRN: [id number]  Unit/Bed#:  Encounter: [id number]    A/P:  1  Acute kidney injury on top of chronic kidney disease   Creatinine improving with aggressive diuresis, would continue at this time  Would avoid potential nephrotoxins and continue to work on optimizing the patient's overall hemodynamics  2  Chronic kidney disease stage 3 with baseline creatinine around 1 2 mg/dL  3  Probable cardiorenal syndrome   Continue aggressive diuresis on furosemide drip, patient continues to improve  Will need to keep a close eye on electrolytes as well as kidney function  4  Acute on chronic diastolic congestive heart failure   As mentioned above continue aggressive diuresis  5  Hypokalemia   Continue with aggressive potassium supplementation in the setting of Lasix drip  6  Septic shock due to panniculitis   Continue with antibiotic coverage  7  Cirrhosis of liver  8  Atrial fibrillation rapid ventricular response now with controlled ventricular response   Continue monitor, continue optimize patient's overall hemodynamics      Follow up reason for today's visit:  Acute kidney injury/chronic kidney disease/cardiorenal syndrome    Septic shock [facility])    Patient Active Problem List   Diagnosis    Chronic diastolic (congestive) heart failure (HCC)    Type 2 diabetes mellitus with diabetic chronic kidney disease (HCC)    Lymphedema    Chronic venous insufficiency    Varicose veins of both lower extremities with inflammation    Hypertensive heart and chronic kidney disease with heart failure and stage 1 through stage 4 chronic kidney disease, or chronic kidney disease (HCC)    STEFF (obstructive sleep apnea)    Pleural effusion, right    SOB (shortness of breath)    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 ascites (HCC)    Tobacco abuse    Chronic anemia    Thrombocytopenia (HCC)    Other cirrhosis of liver ([facility] [address] )    Encounter for screening for other viral diseases  Lactic acidosis    Atrial fibrillation (HCC)    Diverticulosis of colon    Lesion of spleen    Type 2 diabetes mellitus with hyperglycemia, without long-term current use of insulin (HCC)    Chronic diastolic CHF (congestive heart failure) (HCC)    Tobacco use    Hyponatremia    Constipation    Pulmonary hypertension (HCC)    Vitamin D deficiency    Atelectasis    Anemia    Hypercalcemia    Acute kidney injury superimposed on chronic kidney disease (HCC)    Hypertensive nephrosclerosis    Chronic kidney disease-mineral and bone disorder    Hypotension    Right wrist pain    Morbid (severe) obesity due to excess calories (HCC)    Smoking    Iron deficiency    Chronic right-sided congestive heart failure (HCC)    Septic shock (HCC)    Panniculitis    Hypoalbuminemia    NSVT (nonsustained ventricular tachycardia) (HCC)         Subjective:   No acute events overnight, patient feeling improved overall  Objective:     Vitals: Blood pressure 115/59, pulse 93, temperature (!) 97 3 °F (36 3 °C), temperature source Temporal, resp  rate (!) 25, height 6' (1 829 m), weight (!) 154 kg (339 lb 1 1 oz), SpO2 99 %  ,Body mass index is 45 99 kg/m²  Weight (last 2 days)     Date/Time   Weight    09/18/21 0600   (!) 154 (339 07)    09/17/21 0532   (!) 159 (349 65)    09/16/21 0644   (!) 158 (347 45)    09/16/21 0600   (!) 158 (347 45)                Intake/Output Summary (Last 24 hours) at 9/18/2021 1017  Last data filed at 9/18/2021 0921  Gross per 24 hour   Intake 344 06 ml   Output 4950 ml   Net -4605 94 ml     I/O last 3 completed shifts:   In: 1334 1 [P O :960; I V :74 1; IV Piggyback:300]  Out: 6625 [Urine:6625]         Physical Exam: /59   Pulse 93   Temp (!) 97 3 °F (36 3 °C) (Temporal)   Resp (!) 25   Ht 6' (1 829 m)   Wt (!) 154 kg (339 lb 1 1 oz)   SpO2 99%   BMI 45 99 kg/m²     General Appearance:    Alert, cooperative, no distress, appears stated age   Head:    Normocephalic, without obvious abnormality, atraumatic   Eyes:    Conjunctiva/corneas clear   Ears:    Normal external ears   Nose:   Nares normal, septum midline, mucosa normal, no drainage    or sinus tenderness   Throat:   Lips, mucosa, and tongue normal; teeth and gums normal   Neck:   Supple   Back:     Symmetric, no curvature, ROM normal, no CVA tenderness   Lungs:     Reduced bilaterally   Chest wall:    No tenderness or deformity   Heart:    Regular rate and rhythm, S1 and S2 normal, no murmur, rub   or gallop   Abdomen:     Soft, non-tender, bowel sounds active   Extremities:   Extremities normal, atraumatic, no cyanosis, +2 to +3 bilateral lower extremity edema   Skin:   Skin color, texture, turgor normal, no rashes or lesions   Lymph nodes:   Cervical normal   Neurologic:   CNII-XII intact            Lab, Imaging and other studies: I have personally reviewed pertinent labs  CBC:   Lab Results   Component Value Date    WBC 5 82 09/18/2021    HGB 7 8 (L) 09/18/2021    HCT 25 9 (L) 09/18/2021    MCV 95 09/18/2021     09/18/2021    MCH 28 6 09/18/2021    MCHC 30 1 (L) 09/18/2021    RDW 20 2 (H) 09/18/2021    MPV 9 8 09/18/2021    NRBC 0 09/18/2021     CMP:   Lab Results   Component Value Date    K 3 1 (L) 09/18/2021    CL 94 (L) 09/18/2021    CO2 29 09/18/2021    BUN 69 (H) 09/18/2021    CREATININE 2 40 (H) 09/18/2021    CALCIUM 8 1 (L) 09/18/2021    AST 14 09/18/2021    ALT 15 09/18/2021    ALKPHOS 164 (H) 09/18/2021    EGFR 28 09/18/2021           Results from last 7 days   Lab Units 09/18/21  0553 09/17/21  0436 09/16/21  0452   POTASSIUM mmol/L 3 1* 4 0 4 3   CHLORIDE mmol/L 94* 93* 93*   CO2 mmol/L 29 28 28   BUN mg/dL 69* 63* 59*   CREATININE mg/dL 2 40* 2 68* 2 54*   CALCIUM mg/dL 8 1* 8 1* 8 5   ALK PHOS U/L 164* 152* 171*   ALT U/L 15 15 9*   AST U/L 14 11 12         Phosphorus: No results found for: PHOS  Magnesium:   Lab Results   Component Value Date    MG 2 0 09/18/2021     Urinalysis: No results found for: Elesa Woodruff, SPECGRAV, PHUR, LEUKOCYTESUR, NITRITE, PROTEINUA, GLUCOSEU, KETONESU, BILIRUBINUR, BLOODU  Ionized Calcium: No results found for: CAION  Coagulation:   Lab Results   Component Value Date    INR 2 09 (H) 09/18/2021     Troponin: No results found for: TROPONINI  ABG: No results found for: PHART, MXS9MUP, PO2ART, QOI7HUC, F2SPCDAY, BEART, SOURCE  Radiology review:     IMAGING  Procedure: US kidney and bladder    Result Date: 9/16/2021  Narrative: RENAL ULTRASOUND INDICATION:   MARISABEL  COMPARISON: None TECHNIQUE:   Ultrasound of the retroperitoneum was performed with a curvilinear transducer utilizing volumetric sweeps and still imaging techniques  FINDINGS: Technically limited examination due to body habitus  KIDNEYS: Within normal limits of size  Right kidney:  13 0 cm  Left kidney:  11 5 cm  Right kidney Normal echogenicity and contour  No suspicious masses detected  No hydronephrosis  No shadowing calculi  No perinephric fluid collections  Left kidney Normal echogenicity and contour  No suspicious masses detected  No hydronephrosis  No shadowing calculi  No perinephric fluid collections  URETERS: Nonvisualized  BLADDER: The urinary bladder is obscured due to body habitus and extensive lower abdominal wall edema  Impression: Technically limited examination due to body habitus and extensive lower abdominal wall edema  No hydronephrosis  Urinary bladder is obscured   Workstation performed: [id number]       Current Facility-Administered Medications   Medication Dose Route Frequency    cefepime (MAXIPIME) IVPB (premix in dextrose) 1,000 mg 50 mL  1,000 mg Intravenous Q24H    furosemide (LASIX) 500 mg infusion 50 mL  10 mg/hr Intravenous Continuous    HYDROmorphone (DILAUDID) injection 0 5 mg  0 5 mg Intravenous Q6H PRN    metoprolol tartrate (LOPRESSOR) tablet 50 mg  50 mg Oral Q12H GERRY    midodrine (PROAMATINE) tablet 7 5 mg  7 5 mg Oral TID AC    nicotine (NICODERM CQ) 14 mg/24hr TD 24 hr patch 1 patch  1 patch Transdermal Daily    ondansetron (ZOFRAN) injection 4 mg  4 mg Intravenous Q6H PRN    pantoprazole (PROTONIX) EC tablet 40 mg  40 mg Oral Early Morning    polyethylene glycol (MIRALAX) packet 17 g  17 g Oral Daily PRN    potassium chloride (K-DUR,KLOR-CON) CR tablet 40 mEq  40 mEq Oral Once    potassium chloride 20 mEq IVPB (premix)  20 mEq Intravenous Once    [START ON 9/19/2021] vancomycin (VANCOCIN) 1,250 mg in sodium chloride 0 9 % 250 mL IVPB  1,250 mg Intravenous Q48H     Medications Discontinued During This Encounter   Medication Reason    DOPamine (INTROPIN) 400 mg in 250 mL infusion (premix)     docusate sodium (COLACE) 100 mg capsule     ferrous sulfate 324 (65 Fe) mg     midodrine (PROAMATINE) 10 MG tablet     vancomycin (VANCOCIN) 2,250 mg in sodium chloride 0 9 % 500 mL IVPB     warfarin (COUMADIN) tablet 5 mg     warfarin (COUMADIN) tablet 2 5 mg     metoprolol succinate (TOPROL-XL) 24 hr tablet 25 mg     sodium chloride 0 9 % infusion     metoprolol tartrate (LOPRESSOR) tablet 25 mg     acetaminophen (TYLENOL) tablet 650 mg     vancomycin (VANCOCIN) 1,500 mg in sodium chloride 0 9 % 500 mL IVPB     furosemide (LASIX) injection 60 mg     midodrine (PROAMATINE) tablet 10 mg     vancomycin (VANCOCIN) 1,250 mg in sodium chloride 0 9 % 250 mL IVPB     vancomycin (VANCOCIN) 1,250 mg in sodium chloride 0 9 % 250 mL IVPB     norepinephrine (LEVOPHED) 4 mg (STANDARD CONCENTRATION) IV in sodium chloride 0 9% 250 mL     octreotide (SandoSTATIN) injection 50 mcg     albumin human (FLEXBUMIN) 25 % injection 25 g     diltiazem (CARDIZEM) 125 mg in sodium chloride 0 9 % 125 mL infusion     metoprolol tartrate (LOPRESSOR) partial tablet 37 5 mg     cefepime (MAXIPIME) IVPB (premix in dextrose) 2,000 mg 50 mL        Leon Edwards DO      This progress note was produced in part using a dictation device which may document imprecise wording from author's original intent

## 2021-09-18 NOTE — ASSESSMENT & PLAN NOTE
Lab Results   Component Value Date    EGFR 28 09/18/2021    EGFR 24 09/17/2021    EGFR 26 09/16/2021    CREATININE 2 40 (H) 09/18/2021    CREATININE 2 68 (H) 09/17/2021    CREATININE 2 54 (H) 09/16/2021   · Present on admission with a creatinine of 2 06, with baseline of 1 2 -1 4 - creatinine had been improving, worsened previously to a value of 2 86  Initial presentation with sepsis and septic shock - presumed prerenal etiology with creatinine improving with IV fluids  Patient with concurrent worsening anemia and hypoalbuminemia as well  Patient also has a history of right-sided and potentially diastolic CHF, and appears to be volume overloaded, with evidence of vascular congestion and IVC engorgement by CT, weight gain, and net positive fluid balance  Patient is overall volume overloaded - creatinine improved to 2 4 this morning with aggressive diuresis  · Remains off IV fluids  · Attempts at bladder scan unsuccessful due to pain - patient with overlying panniculitis  · Renal ultrasound without evidence of hydronephrosis  · Remains on Lasix gtt - weight down from 159 kg --> 154 kg  · Continue to avoid nephrotoxins, and renally dose medications when appropriate  · Nephrology officially consulted and following

## 2021-09-18 NOTE — ASSESSMENT & PLAN NOTE
History of a chronic, partially loculated right pleural effusion with associated atelectasis at the right lung base  He was admitted from 05/26 through 06/09/2021 for this, and transferred to [facility] - treated with chest tube with infusion of tPA  Repeat scan on 05/30 revealed a loculated right hydropneumothorax, after which he underwent a right-sided thoracoscopic decortication 06/02/2021  He was scheduled for thoracentesis via IR on 09/14 - given clinical picture decided to hold off on thoracentesis  CT of the chest shows a large chronic right pleural effusion with probable partial loculations  Will continue to hold off repeat tap and monitor

## 2021-09-19 NOTE — ASSESSMENT & PLAN NOTE
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

## 2021-09-19 NOTE — ASSESSMENT & PLAN NOTE
Lab Results   Component Value Date    HGBA1C 5 5 05/21/2021     Recent Labs     09/18/21  1052 09/18/21  1646 09/19/21  0756 09/19/21  1116   POCGLU 134 140 126 149*     Does not appear to be in any glycemic agents as an outpatient, with A1c in the normal range  Continue to monitor blood sugars

## 2021-09-19 NOTE — ASSESSMENT & PLAN NOTE
Last ECHO 02/2021 with preserved LVEF, mildly increased LV wall thickness, and mildly reduced RV systolic function  · CT 9/13 with vascular congestion, IVC engorgement consistent with cardiac congestion  · Patient demonstrating weight gain and net positive fluid status  · Has received significant IV fluids in the setting of septic shock, which were discontinued 9/15  · Also with hypoalbuminemia which is contributing to worsening overall extremity edema  · Overall appears volume overloaded, but also with concurrent MARISABEL and low FENA  · Home diuretic therapy in the form of 80 mg furosemide BID has been on hold since admission  · Was initiated on IV furosemide 60 mg BID without adequate diuresis  · Initiated on Lasix gtt 9/16 by Nephrology with good diuresis  · Continue to monitor daily weights, I/O's - baseline weight 300 lb - increased to 349 lb, currently at 326 lb

## 2021-09-19 NOTE — PROGRESS NOTES
[address] [facility] [address] [place]  Progress Note - Sree Sanchez 1960, 57 y o  male MRN: [id number]  Unit/Bed#:  Encounter: [id number]  Primary Care Provider: Nisha Crane DO   Date and time admitted to hospital: 9/13/2021  9:51 AM    * Septic shock [facility])  Assessment & Plan  Presented with lower abdominal pain onset on the night prior to admission  Sepsis criteria present with tachycardia, tachypnea, hypotension, leukocytosis, and fever, with associated organ dysfunction with evidence of MARISABEL  Source presumed to be skin and soft tissue in patient with evidence of panniculitis  Sepsis has resolved  · CT with dermal thickening and subcutaneous edema in the ventral lower pannus without collection, soft tissue gas, or inflammatory changes extending into the perineum  · Continue broad-spectrum coverage with vancomycin and cefepime, day #6  · Initially on IV fluids - discontinued  · Levophed successfully weaned off  · Right-sided IJ TLC discontinued   Blood cultures with no growth X 5 days   Procalcitonin elevated 3 89 --> 7 87, 8 77, 7 42, 2 59 this morning   Was initiated on midodrine, octreotide, and additional albumin by Critical Care previously  All discontinued   Critical care and nephrology consulted and following  Panniculitis  Assessment & Plan  Treatment as above  Appearance has significantly improved, and sepsis has resolved  Acute kidney injury superimposed on chronic kidney disease [facility])  Assessment & Plan  Lab Results   Component Value Date    EGFR 33 09/19/2021    EGFR 28 09/18/2021    EGFR 24 09/17/2021    CREATININE 2 11 (H) 09/19/2021    CREATININE 2 40 (H) 09/18/2021    CREATININE 2 68 (H) 09/17/2021   · Present on admission with a creatinine of 2 06, with baseline of 1 2 -1 4 - creatinine had been improving, worsened previously to a value of 2 86      Initial presentation with sepsis and septic shock - presumed prerenal etiology with creatinine improving with IV fluids  Patient with concurrent worsening anemia and hypoalbuminemia as well  Patient also has a history of right-sided and potentially diastolic CHF, and appears to be volume overloaded, with evidence of vascular congestion and IVC engorgement by CT, weight gain, and net positive fluid balance  Patient remains overall volume overloaded but improved - creatinine improved to 2 11 this morning with aggressive diuresis  · Remains off IV fluids  · Attempts at bladder scan unsuccessful due to pain - patient with overlying panniculitis  · Renal ultrasound without evidence of hydronephrosis  · Remains on Lasix gtt - weight down from 159 kg --> 148 kg  · Continue to avoid nephrotoxins, and renally dose medications when appropriate  · Nephrology officially consulted and following  Chronic anemia  Assessment & Plan  Known past medical history in the setting of CKD and cirrhosis, as well as 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, and on chronic AC with warfarin  Baseline Hgb of 7-8  · Worsening anemia in the setting of acute illness  · FOBT negative X 2    · Has required transfusion with PRBCs, currently s/p 3 Units  · Continue to trend H/H - current stable at 8 4   · Iron studies with low TIBC and elevated ferritin, consistent with anemia of chronic disease  Atrial fibrillation [facility])  Assessment & Plan  History of AFib, presented with RVR in the setting of sepsis  Chronically on metoprolol succinate twice daily and AC with warfarin  · Required Cardizem gtt for rate control initially - weaned off  · Reintroduced metoprolol with tartrate formulation previously - increased dose again 9/17  · Warfarin was held in the setting of worsening anemia and previously supratherapeutic INR  · Resumed warfarin 9/18 - additional low-dose today given subtherapeutic INR  · Continue to monitor heart rate with cardiopulmonary monitoring    · Heart rate is overall improved but remains mildly suboptimal - hold off on further titration of beta-blocker given mild hypotension in the setting of aggressive diuresis  Hyponatremia  Assessment & Plan  Baseline in the 130-140s range, presented with a value of 127 along with concurrent hypochloremia  In the setting of sepsis thought secondary to hypovolemia, but patient appears volume overloaded with sodium unchanged despite IV fluids  Current levels improving with diuresis and normalized today to 140  Low-sodium is very likely due to hypervolemic hyponatremia  Hyponatremia has resolved  Continue to monitor  Pleural effusion, right  Assessment & Plan  History of a chronic, partially loculated right pleural effusion with associated atelectasis at the right lung base  He was admitted from 05/26 through 06/09/2021 for this, and transferred to [facility] - treated with chest tube with infusion of tPA  Repeat scan on 05/30 revealed a loculated right hydropneumothorax, after which he underwent a right-sided thoracoscopic decortication 06/02/2021  He was scheduled for thoracentesis via IR on 09/14 - given clinical picture decided to hold off on thoracentesis  CT of the chest shows a large chronic right pleural effusion with probable partial loculations  Will continue to hold off repeat tap and monitor  Chronic right-sided congestive heart failure [facility])  Assessment & Plan  Last ECHO 02/2021 with preserved LVEF, mildly increased LV wall thickness, and mildly reduced RV systolic function  · CT 9/13 with vascular congestion, IVC engorgement consistent with cardiac congestion  · Patient demonstrating weight gain and net positive fluid status  · Has received significant IV fluids in the setting of septic shock, which were discontinued 9/15  · Also with hypoalbuminemia which is contributing to worsening overall extremity edema  · Overall appears volume overloaded, but also with concurrent MARISABEL and low FENA    · Home diuretic therapy in the form of 80 mg furosemide BID has been on hold since admission  · Was initiated on IV furosemide 60 mg BID without adequate diuresis  · Initiated on Lasix gtt 9/16 by Nephrology with good diuresis  · Continue to monitor daily weights, I/O's - baseline weight 300 lb - increased to 349 lb, currently at 326 lb  Hypoalbuminemia  Assessment & Plan  In the setting of cirrhosis  Given patient's septic shock, hypotension, and concurrent low albumin - has received albumin infusions  Chronic venous insufficiency  Assessment & Plan  Chronic venous insufficiency with concurrent hypoalbuminemia, cirrhosis, and right-sided CHF  Patient has also been fluid resuscitated, with worsening lower extremity edema  Type 2 diabetes mellitus with diabetic chronic kidney disease [facility])  Assessment & Plan  Lab Results   Component Value Date    HGBA1C 5 5 05/21/2021     Recent Labs     09/18/21  1052 09/18/21  1646 09/19/21  0756 09/19/21  1116   POCGLU 134 140 126 149*     Does not appear to be in any glycemic agents as an outpatient, with A1c in the normal range  Continue to monitor blood sugars  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 ascites ([facility] [address] )  Assessment & Plan  Known past medical history secondary to alcoholic and fatty liver disease  · Follows outpatient with Gastroenterology; last seen on 08/04/2021  · Not a candidate for liver transplant given BMI and comorbidities  · Avoid hepatotoxic medications  · Serial lab monitoring    STEFF (obstructive sleep apnea)  Assessment & Plan  Known past medical history with CPAP compliance at home  Continue CPAP HS per home settings  NSVT (nonsustained ventricular tachycardia) ([facility])  Assessment & Plan  Brief run of nonsustained V-tach on cardiopulmonary monitoring on 09/15 late in the afternoon  Patient's EF approximately 6 months ago was preserved  Has remained in AFib and with sepsis, original septic shock  Was also maintained on Levophed and Cardizem gtt      Patient was asymptomatic and episode was very brief  Continue to monitor renal function and electrolytes carefully, and aim for potassium greater than 4 and magnesium greater than 2  Continue cardiopulmonary monitoring  VTE Pharmacologic Prophylaxis:   Pharmacologic: Warfarin (Coumadin)  Mechanical VTE Prophylaxis in Place: Yes    Patient Centered Rounds: I have performed bedside rounds with nursing staff today  Discussions with Specialists or Other Care Team Provider:  Nephrology    Education and Discussions with Family / Patient:  Yes    Time Spent for Care: 30 minutes  More than 50% of total time spent on counseling and coordination of care as described above  Current Length of Stay: 6 day(s)    Current Patient Status: Inpatient   Certification Statement: The patient will continue to require additional inpatient hospital stay due to Continued diuresis and monitoring of renal function  Discharge Plan:  TBD    Code Status: Level 1 - Full Code    Subjective:   Patient seen and examined - continues to report significant improvement in overall symptoms  Panniculitis appears vastly improved, and weight continues to drop with aggressive diuresis  No overnight events reported  Remains afebrile  Objective:     Vitals:   Temp (24hrs), Av 8 °F (36 °C), Min:96 1 °F (35 6 °C), Max:97 4 °F (36 3 °C)    Temp:  [96 1 °F (35 6 °C)-97 4 °F (36 3 °C)] 96 4 °F (35 8 °C)  HR:  [] 104  Resp:  [16-53] 22  BP: ()/(52-79) 94/79  SpO2:  [93 %-99 %] 99 %  Body mass index is 44 34 kg/m²  Input and Output Summary (last 24 hours): Intake/Output Summary (Last 24 hours) at 2021 1220  Last data filed at 2021 0955  Gross per 24 hour   Intake 1030 49 ml   Output 4975 ml   Net -3944 51 ml       Physical Exam:   Vitals and nursing note reviewed  Constitutional:       General: He is not in acute distress      Appearance: He is well-developed  He is obese  He is ill-appearing   He is not toxic-appearing       Comments: Chronically ill-appearing    Cardiovascular:      Rate and Rhythm: Tachycardia present  Rhythm irregular       Heart sounds: No murmur heard  Pulmonary:      Effort: Pulmonary effort is normal  No respiratory distress       Breath sounds: Rales present  No rhonchi       Comments: Decreased right basilar breath sounds, mild bibasilar crackles improved  Abdominal:      General: There is distension       Palpations: Abdomen is soft       Tenderness: There is no abdominal tenderness  There is no guarding or rebound  Musculoskeletal:         General: Swelling present  No tenderness       Cervical back: Neck supple       Comments: Significant bilateral acute on chronic appearing lower extremity edema   Changes consistent with chronic venous stasis    Skin:     General: Skin is warm and dry       Comments: Erythema and warmth overlying lower ventral pannus appear significantly improved since prior exam and nearly resolved    Neurological:      General: No focal deficit present       Mental Status: He is alert and oriented to person, place, and time  Psychiatric:         Mood and Affect: Mood normal          Behavior: Behavior normal      Additional Data:     Labs:    Results from last 7 days   Lab Units 09/19/21  0602 09/17/21  0902 09/17/21  0436   WBC Thousand/uL 5 74   < > 5 76   HEMOGLOBIN g/dL 8 4*  --  7 4*   HEMATOCRIT % 28 3*  --  24 6*   PLATELETS Thousands/uL 184   < > 209   BANDS PCT %  --   --  2   NEUTROS PCT % 80*  --   --    LYMPHS PCT % 7*  --   --    LYMPHO PCT %  --   --  11*   MONOS PCT % 8  --   --    MONO PCT %  --   --  6   EOS PCT % 2  --  0    < > = values in this interval not displayed       Results from last 7 days   Lab Units 09/19/21  0602   SODIUM mmol/L 140   POTASSIUM mmol/L 3 0*   CHLORIDE mmol/L 95*   CO2 mmol/L 33*   BUN mg/dL 65*   CREATININE mg/dL 2 11*   ANION GAP mmol/L 12   CALCIUM mg/dL 8 5   ALBUMIN g/dL 2 9*   TOTAL BILIRUBIN mg/dL 0 76   ALK PHOS U/L 194*   ALT U/L 12 AST U/L 14   GLUCOSE RANDOM mg/dL 118     Results from last 7 days   Lab Units 09/19/21  0602   INR  1 81*     Results from last 7 days   Lab Units 09/19/21  1116 09/19/21  0756 09/18/21  1646 09/18/21  1052 09/18/21  0801 09/17/21  2049 09/17/21  1605 09/17/21  1150 09/17/21  0734 09/16/21  1616 09/16/21  1148 09/16/21  0806   POC GLUCOSE mg/dl 149* 126 140 134 142* 139 128 166* 148* 170* 155* 156*         Results from last 7 days   Lab Units 09/18/21  0553 09/16/21  0452 09/15/21  1137 09/14/21  0547 09/13/21  1100   LACTIC ACID mmol/L  --   --   --   --  1 7   PROCALCITONIN ng/ml 2 59* 7 42* 8 77* 7 87* 3 89*           * I Have Reviewed All Lab Data Listed Above  * Additional Pertinent Lab Tests Reviewed: All Labs Within Last 24 Hours Reviewed     Recent Cultures (last 7 days):     Results from last 7 days   Lab Units 09/13/21  1100   BLOOD CULTURE  No Growth After 5 Days  No Growth After 5 Days         Last 24 Hours Medication List:   Current Facility-Administered Medications   Medication Dose Route Frequency Provider Last Rate    cefepime  1,000 mg Intravenous Q24H rIis De La Torre MD 1,000 mg (09/19/21 0802)    furosemide  10 mg/hr Intravenous Continuous Shannan Sheets CRNP 10 mg/hr (09/19/21 9735)    HYDROmorphone  0 5 mg Intravenous Q6H PRN Iris De La Torre MD      metoprolol tartrate  50 mg Oral Q12H [facility] Iris De La Torre MD      midodrine  7 5 mg Oral TID AC ShannanERNA MatthewsNP      nicotine  1 patch Transdermal Daily Iris De La Torre MD      ondansetron  4 mg Intravenous Q6H PRN Iris De La Torre MD      pantoprazole  40 mg Oral Early Morning Iris De La Torre MD      polyethylene glycol  17 g Oral Daily PRN Iris De La Torre MD      potassium chloride  40 mEq Oral Once Iris De La Torre MD      potassium chloride  20 mEq Intravenous Once Iris De La Torre MD 20 mEq (09/19/21 1041)    [START ON 9/20/2021] vancomycin  1,250 mg Intravenous Q48H Iris De La Torre MD      [START ON 9/20/2021] warfarin 5 mg Oral Once per day on Mon Wed Fri Paula Sharp MD      And    warfarin  2 5 mg Oral Once per day on Sun Tue Thu Sat Paula Sharp MD     [employer] warfarin  2 5 mg Oral Once (warfarin) Paula Sharp MD          Today, Patient Was Seen By: Paula Sharp MD    ** Please Note: Dictation voice to text software may have been used in the creation of this document   **

## 2021-09-19 NOTE — PROGRESS NOTES
Progress Note - Nephrology   Desirae Rmasay 57 y o  male MRN: [id number]  Unit/Bed#:  Encounter: [id number]    A/P:  1  Acute kidney injury on top of chronic kidney disease              Creatinine continues to improve with Lasix drip, will continue current course of care  Avoid potential nephrotoxins and continue to monitor hemodynamics to optimize overall care  2  Chronic kidney disease stage 3 with baseline creatinine around 1 2 mg/dL  3  Probable cardiorenal syndrome               patient's creatinine continues to improve with aggressive diuresis on furosemide drip  Weight has decreased from 154 kilos down to 148 kilos over last 24 hours  4  Acute on chronic diastolic congestive heart failure               improved, patient continues remain decompensated at this time, continue with Lasix drip for diuresis  5  Hypokalemia               patient is provided with 100 mEq of potassium supplementation today, continue monitor supplement aggressively  6  Septic shock due to panniculitis               continue vancomycin  7  Cirrhosis of liver  8  Atrial fibrillation rapid ventricular response now with controlled ventricular response  9   Hypotension   Continue midodrine for blood pressure support    Follow up reason for today's visit:  Acute kidney injury/chronic kidney disease/electrolyte disorder    Septic shock [facility])    Patient Active Problem List   Diagnosis    Chronic diastolic (congestive) heart failure (HCC)    Type 2 diabetes mellitus with diabetic chronic kidney disease (HCC)    Lymphedema    Chronic venous insufficiency    Varicose veins of both lower extremities with inflammation    Hypertensive heart and chronic kidney disease with heart failure and stage 1 through stage 4 chronic kidney disease, or chronic kidney disease (HCC)    STEFF (obstructive sleep apnea)    Pleural effusion, right    SOB (shortness of breath)    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 ascites (HCC)    Tobacco abuse    Chronic anemia    Thrombocytopenia (HCC)    Other cirrhosis of liver ([facility] )    Encounter for screening for other viral diseases     Lactic acidosis    Atrial fibrillation (HCC)    Diverticulosis of colon    Lesion of spleen    Type 2 diabetes mellitus with hyperglycemia, without long-term current use of insulin (HCC)    Chronic diastolic CHF (congestive heart failure) (HCC)    Tobacco use    Hyponatremia    Constipation    Pulmonary hypertension (HCC)    Vitamin D deficiency    Atelectasis    Anemia    Hypercalcemia    Acute kidney injury superimposed on chronic kidney disease ([facility] )    Hypertensive nephrosclerosis    Chronic kidney disease-mineral and bone disorder    Hypotension    Right wrist pain    Morbid (severe) obesity due to excess calories (HCC)    Smoking    Iron deficiency    Chronic right-sided congestive heart failure (HCC)    Septic shock (HCC)    Panniculitis    Hypoalbuminemia    NSVT (nonsustained ventricular tachycardia) (HCC)         Subjective:   No acute events overnight, patient denies muscular cramps or spasms  Objective:     Vitals: Blood pressure 94/79, pulse 104, temperature (!) 96 4 °F (35 8 °C), temperature source Tympanic, resp  rate 22, height 6' (1 829 m), weight (!) 148 kg (326 lb 15 1 oz), SpO2 99 %  ,Body mass index is 44 34 kg/m²  Weight (last 2 days)     Date/Time   Weight    09/19/21 0600   (!) 148 (326 94)    09/18/21 0600   (!) 154 (339 07)    09/17/21 0532   (!) 159 (349 65)                Intake/Output Summary (Last 24 hours) at 9/19/2021 1141  Last data filed at 9/19/2021 0955  Gross per 24 hour   Intake 1030 49 ml   Output 5075 ml   Net -4044 51 ml     I/O last 3 completed shifts: In: 1047 5 [P O :1000;  I V :47 5]  Out: 8400 [Urine:8400]         Physical Exam: BP 94/79 (BP Location: Left arm)   Pulse 104   Temp (!) 96 4 °F (35 8 °C) (Tympanic)   Resp 22   Ht 6' (1 829 m)   Wt (!) 148 kg (326 lb 15 1 oz)   SpO2 99%   BMI 44 34 kg/m² General Appearance:    Alert, cooperative, no distress, appears stated age   Head:    Normocephalic, without obvious abnormality, atraumatic   Eyes:    Conjunctiva/corneas clear   Ears:    Normal external ears   Nose:   Nares normal, septum midline, mucosa normal, no drainage    or sinus tenderness   Throat:   Lips, mucosa, and tongue normal; teeth and gums normal   Neck:   Supple   Back:     Symmetric, no curvature, ROM normal, no CVA tenderness   Lungs:     Reduced bilaterally   Chest wall:    No tenderness or deformity   Heart:    Regular rate and rhythm, S1 and S2 normal, no murmur, rub   or gallop   Abdomen:     Soft, non-tender, bowel sounds active   Extremities:   Extremities normal, atraumatic, no cyanosis, +3 bilateral lower extremity edema up to the presacral region   Skin:   Skin color, texture, turgor normal, no rashes or lesions   Lymph nodes:   Cervical normal   Neurologic:   CNII-XII intact            Lab, Imaging and other studies: I have personally reviewed pertinent labs  CBC:   Lab Results   Component Value Date    WBC 5 74 09/19/2021    HGB 8 4 (L) 09/19/2021    HCT 28 3 (L) 09/19/2021    MCV 95 09/19/2021     09/19/2021    MCH 28 2 09/19/2021    MCHC 29 7 (L) 09/19/2021    RDW 20 2 (H) 09/19/2021    MPV 9 6 09/19/2021    NRBC 0 09/19/2021     CMP:   Lab Results   Component Value Date    K 3 0 (L) 09/19/2021    CL 95 (L) 09/19/2021    CO2 33 (H) 09/19/2021    BUN 65 (H) 09/19/2021    CREATININE 2 11 (H) 09/19/2021    CALCIUM 8 5 09/19/2021    AST 14 09/19/2021    ALT 12 09/19/2021    ALKPHOS 194 (H) 09/19/2021    EGFR 33 09/19/2021           Results from last 7 days   Lab Units 09/19/21  0602 09/18/21  0553 09/17/21  0436   POTASSIUM mmol/L 3 0* 3 1* 4 0   CHLORIDE mmol/L 95* 94* 93*   CO2 mmol/L 33* 29 28   BUN mg/dL 65* 69* 63*   CREATININE mg/dL 2 11* 2 40* 2 68*   CALCIUM mg/dL 8 5 8 1* 8 1*   ALK PHOS U/L 194* 164* 152*   ALT U/L 12 15 15   AST U/L 14 14 11         Phosphorus: No results found for: PHOS  Magnesium:   Lab Results   Component Value Date    MG 1 8 09/19/2021     Urinalysis: No results found for: Con Laura, SPECGRAV, PHUR, LEUKOCYTESUR, NITRITE, PROTEINUA, GLUCOSEU, KETONESU, BILIRUBINUR, BLOODU  Ionized Calcium: No results found for: CAION  Coagulation:   Lab Results   Component Value Date    INR 1 81 (H) 09/19/2021     Troponin: No results found for: TROPONINI  ABG: No results found for: PHART, UIZ3WCG, PO2ART, ZLE6SBW, R3SGONYK, BEART, SOURCE  Radiology review:     IMAGING  No results found      Current Facility-Administered Medications   Medication Dose Route Frequency    cefepime (MAXIPIME) IVPB (premix in dextrose) 1,000 mg 50 mL  1,000 mg Intravenous Q24H    furosemide (LASIX) 500 mg infusion 50 mL  10 mg/hr Intravenous Continuous    HYDROmorphone (DILAUDID) injection 0 5 mg  0 5 mg Intravenous Q6H PRN    metoprolol tartrate (LOPRESSOR) tablet 50 mg  50 mg Oral Q12H [address]    midodrine (PROAMATINE) tablet 7 5 mg  7 5 mg Oral TID AC    nicotine (NICODERM CQ) 14 mg/24hr TD 24 hr patch 1 patch  1 patch Transdermal Daily    ondansetron (ZOFRAN) injection 4 mg  4 mg Intravenous Q6H PRN    pantoprazole (PROTONIX) EC tablet 40 mg  40 mg Oral Early Morning    polyethylene glycol (MIRALAX) packet 17 g  17 g Oral Daily PRN    potassium chloride (K-DUR,KLOR-CON) CR tablet 40 mEq  40 mEq Oral Once    potassium chloride 20 mEq IVPB (premix)  20 mEq Intravenous Once    [START ON 9/20/2021] vancomycin (VANCOCIN) 1,250 mg in sodium chloride 0 9 % 250 mL IVPB  1,250 mg Intravenous Q48H    [START ON 9/20/2021] warfarin (COUMADIN) tablet 5 mg  5 mg Oral Once per day on Mon Wed Fri    And    warfarin (COUMADIN) tablet 2 5 mg  2 5 mg Oral Once per day on Sun Tue Thu Sat     Medications Discontinued During This Encounter   Medication Reason    DOPamine (INTROPIN) 400 mg in 250 mL infusion (premix)     docusate sodium (COLACE) 100 mg capsule     ferrous sulfate 324 (65 Fe) mg     midodrine (PROAMATINE) 10 MG tablet     vancomycin (VANCOCIN) 2,250 mg in sodium chloride 0 9 % 500 mL IVPB     warfarin (COUMADIN) tablet 5 mg     warfarin (COUMADIN) tablet 2 5 mg     metoprolol succinate (TOPROL-XL) 24 hr tablet 25 mg     sodium chloride 0 9 % infusion     metoprolol tartrate (LOPRESSOR) tablet 25 mg     acetaminophen (TYLENOL) tablet 650 mg     vancomycin (VANCOCIN) 1,500 mg in sodium chloride 0 9 % 500 mL IVPB     furosemide (LASIX) injection 60 mg     midodrine (PROAMATINE) tablet 10 mg     vancomycin (VANCOCIN) 1,250 mg in sodium chloride 0 9 % 250 mL IVPB     vancomycin (VANCOCIN) 1,250 mg in sodium chloride 0 9 % 250 mL IVPB     norepinephrine (LEVOPHED) 4 mg (STANDARD CONCENTRATION) IV in sodium chloride 0 9% 250 mL     octreotide (SandoSTATIN) injection 50 mcg     albumin human (FLEXBUMIN) 25 % injection 25 g     diltiazem (CARDIZEM) 125 mg in sodium chloride 0 9 % 125 mL infusion     metoprolol tartrate (LOPRESSOR) partial tablet 37 5 mg     cefepime (MAXIPIME) IVPB (premix in dextrose) 2,000 mg 50 mL     warfarin (COUMADIN) tablet 2 5 mg     vancomycin (VANCOCIN) 1,250 mg in sodium chloride 0 9 % 250 mL IVPB     potassium chloride (K-DUR,KLOR-CON) CR tablet 20 mEq        Tyron David,       This progress note was produced in part using a dictation device which may document imprecise wording from author's original intent

## 2021-09-19 NOTE — ASSESSMENT & PLAN NOTE
History of AFib, presented with RVR in the setting of sepsis  Chronically on metoprolol succinate twice daily and AC with warfarin  · Required Cardizem gtt for rate control initially - weaned off  · Reintroduced metoprolol with tartrate formulation previously - increased dose again 9/17  · Warfarin was held in the setting of worsening anemia and previously supratherapeutic INR  · Resumed warfarin 9/18 - additional low-dose today given subtherapeutic INR  · Continue to monitor heart rate with cardiopulmonary monitoring  · Heart rate is overall improved but remains mildly suboptimal - hold off on further titration of beta-blocker given mild hypotension in the setting of aggressive diuresis

## 2021-09-19 NOTE — ASSESSMENT & PLAN NOTE
History of a chronic, partially loculated right pleural effusion with associated atelectasis at the right lung base  He was admitted from 05/26 through 06/09/2021 for this, and transferred to [facility] - treated with chest tube with infusion of tPA  Repeat scan on 05/30 revealed a loculated right hydropneumothorax, after which he underwent a right-sided thoracoscopic decortication 06/02/2021  He was scheduled for thoracentesis via IR on 09/14 - given clinical picture decided to hold off on thoracentesis  CT of the chest shows a large chronic right pleural effusion with probable partial loculations  Will continue to hold off repeat tap and monitor

## 2021-09-19 NOTE — PROGRESS NOTES
Day 7 of vanco therapy  Elevated vanco random level of 22 1 All doses will be held today  Random level to be drawn tomorrow at 0600  Will redose at a much lower dose once a random level comes back at less than 20

## 2021-09-19 NOTE — ASSESSMENT & PLAN NOTE
Presented with lower abdominal pain onset on the night prior to admission  Sepsis criteria present with tachycardia, tachypnea, hypotension, leukocytosis, and fever, with associated organ dysfunction with evidence of MARISABEL  Source presumed to be skin and soft tissue in patient with evidence of panniculitis  Sepsis has resolved  · CT with dermal thickening and subcutaneous edema in the ventral lower pannus without collection, soft tissue gas, or inflammatory changes extending into the perineum  · Continue broad-spectrum coverage with vancomycin and cefepime, day #6  · Initially on IV fluids - discontinued  · Levophed successfully weaned off  · Right-sided IJ TLC discontinued   Blood cultures with no growth X 5 days   Procalcitonin elevated 3 89 --> 7 87, 8 77, 7 42, 2 59 this morning   Was initiated on midodrine, octreotide, and additional albumin by Critical Care previously  All discontinued   Critical care and nephrology consulted and following

## 2021-09-19 NOTE — ASSESSMENT & PLAN NOTE
Lab Results   Component Value Date    EGFR 33 09/19/2021    EGFR 28 09/18/2021    EGFR 24 09/17/2021    CREATININE 2 11 (H) 09/19/2021    CREATININE 2 40 (H) 09/18/2021    CREATININE 2 68 (H) 09/17/2021   · Present on admission with a creatinine of 2 06, with baseline of 1 2 -1 4 - creatinine had been improving, worsened previously to a value of 2 86  Initial presentation with sepsis and septic shock - presumed prerenal etiology with creatinine improving with IV fluids  Patient with concurrent worsening anemia and hypoalbuminemia as well  Patient also has a history of right-sided and potentially diastolic CHF, and appears to be volume overloaded, with evidence of vascular congestion and IVC engorgement by CT, weight gain, and net positive fluid balance  Patient remains overall volume overloaded but improved - creatinine improved to 2 11 this morning with aggressive diuresis  · Remains off IV fluids  · Attempts at bladder scan unsuccessful due to pain - patient with overlying panniculitis  · Renal ultrasound without evidence of hydronephrosis  · Remains on Lasix gtt - weight down from 159 kg --> 148 kg  · Continue to avoid nephrotoxins, and renally dose medications when appropriate  · Nephrology officially consulted and following

## 2021-09-20 NOTE — PROGRESS NOTES
Vancomycin Assessment    Reggie Gordon is a 57 y o  male who is currently receiving vancomycin 1250mg q48h when random level is under 20  for skin-soft tissue infection     Relevant clinical data and objective history reviewed:  Creatinine   Date Value Ref Range Status   09/20/2021 1 88 (H) 0 60 - 1 30 mg/dL Final     Comment:     Standardized to IDMS reference method   09/19/2021 2 11 (H) 0 60 - 1 30 mg/dL Final     Comment:     Standardized to IDMS reference method   09/18/2021 2 40 (H) 0 60 - 1 30 mg/dL Final     Comment:     Standardized to IDMS reference method   12/16/2015 1 14 0 60 - 1 30 mg/dL Final     Comment:     Standardized to IDMS reference method   06/07/2015 0 83 0 60 - 1 30 mg/dL Final     Comment:     Standardized to IDMS reference method   06/06/2015 0 73 0 60 - 1 30 mg/dL Final     Comment:     Standardized to IDMS reference method     Vancomycin Rm   Date Value Ref Range Status   09/20/2021 16 2 ug/mL Final     /68   Pulse 91   Temp 98 °F (36 7 °C)   Resp 16   Ht 6' (1 829 m)   Wt (!) 145 kg (319 lb 3 6 oz)   SpO2 97%   BMI 43 29 kg/m²   I/O last 3 completed shifts: In: 970 5 [P O :940; I V :30 5]  Out: 7725 [Urine:7725]  Lab Results   Component Value Date/Time    BUN 60 (H) 09/20/2021 04:57 AM    BUN 16 12/16/2015 07:25 AM    WBC 4 81 09/20/2021 04:57 AM    WBC 6 05 12/16/2015 07:25 AM    HGB 8 4 (L) 09/20/2021 04:57 AM    HGB 13 5 12/16/2015 07:25 AM    HCT 27 6 (L) 09/20/2021 04:57 AM    HCT 40 5 12/16/2015 07:25 AM    MCV 94 09/20/2021 04:57 AM     (H) 12/16/2015 07:25 AM     09/20/2021 04:57 AM     (L) 12/16/2015 07:25 AM     Temp Readings from Last 3 Encounters:   09/20/21 98 °F (36 7 °C)   09/02/21 97 8 °F (36 6 °C) (Tympanic)   08/26/21 97 9 °F (36 6 °C) (Tympanic)     Vancomycin Days of Therapy: 8    Assessment/Plan  The patient is currently on vancomycin utilizing pulse dosing    Baseline risks associated with therapy include: pre-existing renal impairment, advanced age, and dehydration  The patient is receiving 1250mg q48h when random level is under 20  with the most recent vancomycin level being  drawn as a random level due to elevated troughs previously  and therapeutic based on a goal of 15-20 (appropriate for most indications) ; therefore, after clinical evaluation will be changed to 1250mg q48h when random level is under 20  [facility] will continue to follow closely for s/sx of nephrotoxicity, infusion reactions, and appropriateness of therapy  BMP and CBC will be ordered per protocol  Plan for a new random level at 0600 on 9/21/21  Pharmacy will continue to follow the patients culture results and clinical progress daily      Mariya Humphrey, Pharmacist

## 2021-09-20 NOTE — ASSESSMENT & PLAN NOTE
Known past medical history in the setting of CKD and cirrhosis, as well as colonic AVM's requiring APC in the past, and on chronic AC with warfarin  Baseline Hgb of 7-8  · Worsening anemia in the setting of acute illness  · FOBT negative X 2    · Has required transfusion with PRBCs, currently s/p 3 Units  · Continue to trend H/H - current stable  · Iron studies with low TIBC and elevated ferritin, consistent with anemia of chronic disease      Results from last 7 days   Lab Units 09/20/21  0457 09/19/21  0602 09/18/21  0553   WBC Thousand/uL 4 81 5 74 5 82   HEMOGLOBIN g/dL 8 4* 8 4* 7 8*   HEMATOCRIT % 27 6* 28 3* 25 9*   PLATELETS Thousands/uL 190 184 192

## 2021-09-20 NOTE — ASSESSMENT & PLAN NOTE
Lab Results   Component Value Date    EGFR 37 09/20/2021    EGFR 33 09/19/2021    EGFR 28 09/18/2021    CREATININE 1 88 (H) 09/20/2021    CREATININE 2 11 (H) 09/19/2021    CREATININE 2 40 (H) 09/18/2021   · Present on admission with a creatinine of 2 06, with baseline of 1 2 -1 4 - creatinine had been improving, worsened previously to a value of 2 86  Initial presentation with sepsis and septic shock - presumed prerenal etiology with creatinine improving with IV fluids  Patient with concurrent worsening anemia and hypoalbuminemia as well  Patient also has a history of right-sided and potentially diastolic CHF, and appears to be volume overloaded, with evidence of vascular congestion and IVC engorgement by CT, weight gain, and net positive fluid balance  Patient remains overall volume overloaded but improved - creatinine improved to 2 11 this morning with aggressive diuresis  · Remains off IV fluids  · Attempts at bladder scan unsuccessful due to pain - patient with overlying panniculitis  · Renal ultrasound without evidence of hydronephrosis  · Remains on lasix gtt at 10 mg/hr  · Continue to avoid nephrotoxins, and renally dose medications when appropriate  · Nephrology officially consulted and following

## 2021-09-20 NOTE — ASSESSMENT & PLAN NOTE
Brief run of non-sustained V-tach on cardiopulmonary monitoring on 09/15 late in the afternoon  Patient's EF approximately 6 months ago was preserved  Has remained in AFib and with sepsis, original septic shock  Was also maintained on Levophed and Cardizem gtt  Patient was asymptomatic and episode was very brief  Continue to monitor renal function and electrolytes carefully, and aim for potassium level greater than 4 and magnesium level greater than 2      Continue PO metoprolol    Outpatient follow-up with Cardiology

## 2021-09-20 NOTE — ASSESSMENT & PLAN NOTE
History of a chronic, partially loculated right pleural effusion with associated atelectasis at the right lung base  He was admitted from 05/26 through 06/09/2021 for this, and transferred to [facility] - treated with chest tube with infusion of tPA  Repeat scan on 05/30 revealed a loculated right hydropneumothorax, after which he underwent a right-sided thoracoscopic decortication 06/02/2021  He was scheduled for thoracentesis via IR on 09/14 - given clinical picture decided to hold off on thoracentesis  CT of the chest shows a large chronic right pleural effusion with probable partial loculations  Will continue to hold off repeat tap and monitor

## 2021-09-20 NOTE — ASSESSMENT & PLAN NOTE
Lab Results   Component Value Date    HGBA1C 5 5 05/21/2021       Recent Labs     09/19/21  1116 09/19/21  1601 09/20/21  0641 09/20/21  1057   POCGLU 149* 123 157* 152*       Blood Sugar Average: Last 72 hrs:  (P) 142     · Not currently on any diabetic medications  · Insulin sliding scale with blood glucose monitoring ACHS   Outpatient follow-up with PCP in regards to this matter

## 2021-09-20 NOTE — ASSESSMENT & PLAN NOTE
Baseline in the 130-140s range, presented with a value of 127 along with concurrent hypochloremia  In the setting of sepsis thought secondary to hypovolemia, but patient appears volume overloaded with sodium unchanged despite IV fluids  Current levels improving with diuresis and normalized today to 140  Low-sodium is very likely due to hypervolemic hyponatremia  Hyponatremia has resolved  Continue to monitor      Results from last 7 days   Lab Units 09/20/21  0457 09/19/21  0602 09/18/21  0553   SODIUM mmol/L 142 140 133*   POTASSIUM mmol/L 3 2* 3 0* 3 1*   CHLORIDE mmol/L 97* 95* 94*   CO2 mmol/L 36* 33* 29   BUN mg/dL 60* 65* 69*   CREATININE mg/dL 1 88* 2 11* 2 40*   CALCIUM mg/dL 8 4 8 5 8 1*

## 2021-09-20 NOTE — ASSESSMENT & PLAN NOTE
Chronic venous insufficiency with concurrent hypoalbuminemia, cirrhosis, and right-sided CHF  Patient has also been fluid resuscitated with worsening lower extremity edema

## 2021-09-20 NOTE — ASSESSMENT & PLAN NOTE
Presented with lower abdominal pain onset on the night prior to admission  Sepsis criteria present with tachycardia, tachypnea, hypotension, leukocytosis, and fever, with associated organ dysfunction with evidence of MARISABEL  Source presumed to be skin and soft tissue in patient with evidence of panniculitis  Sepsis has resolved  · CT with dermal thickening and subcutaneous edema in the ventral lower pannus without collection, soft tissue gas, or inflammatory changes extending into the perineum  · Continue broad-spectrum coverage with vancomycin and cefepime, day #8  · Initially on IV fluids - discontinued  · Levophed successfully weaned off  · Right-sided IJ TLC discontinued   Blood cultures with no growth X 5 days   Procalcitonin elevated 3 89 --> 7 87, 8 77, 7 42, 2 59, 1 51   Was initiated on midodrine, octreotide, and additional albumin by Critical Care Medicine previously  All discontinued  [address] and Nephrology consulted and following

## 2021-09-20 NOTE — PROGRESS NOTES
[address] [facility] [address] [place]  Progress Note - Mikaela Delgadillo 1960, 57 y o  male MRN: [id number]  Unit/Bed#: 032-08 Encounter: [id number]  Primary Care Provider: Zachary Hanson DO   Date and time admitted to hospital: 9/13/2021  9:51 AM    * Severe sepsis with septic shock [facility])  Assessment & Plan  Presented with lower abdominal pain onset on the night prior to admission  Sepsis criteria present with tachycardia, tachypnea, hypotension, leukocytosis, and fever, with associated organ dysfunction with evidence of MARISABEL  Source presumed to be skin and soft tissue in patient with evidence of panniculitis  Sepsis has resolved  · CT with dermal thickening and subcutaneous edema in the ventral lower pannus without collection, soft tissue gas, or inflammatory changes extending into the perineum  · Continue broad-spectrum coverage with vancomycin and cefepime, day #8  · Initially on IV fluids - discontinued  · Levophed successfully weaned off  · Right-sided IJ TLC discontinued   Blood cultures with no growth X 5 days   Procalcitonin elevated 3 89 --> 7 87, 8 77, 7 42, 2 59, 1 51   Was initiated on midodrine, octreotide, and additional albumin by Critical Care Medicine previously  All discontinued  [address] and Nephrology consulted and following  Panniculitis  Assessment & Plan  · Please see the plan for "Severe sepsis with septic shock"    Pleural effusion, right  Assessment & Plan  History of a chronic, partially loculated right pleural effusion with associated atelectasis at the right lung base  He was admitted from 05/26 through 06/09/2021 for this, and transferred to [facility] - treated with chest tube with infusion of tPA  Repeat scan on 05/30 revealed a loculated right hydropneumothorax, after which he underwent a right-sided thoracoscopic decortication 06/02/2021    He was scheduled for thoracentesis via IR on 09/14 - given clinical picture decided to hold off on thoracentesis  CT of the chest shows a large chronic right pleural effusion with probable partial loculations  Will continue to hold off repeat tap and monitor  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 ascites ([facility] [address] )  Assessment & Plan  Known past medical history secondary to alcoholic and fatty liver disease  · Follows outpatient with Gastroenterology; last seen on 08/04/2021  · Not a candidate for liver transplant given BMI and comorbidities  · Avoid hepatotoxic medications  · Serial lab monitoring    NSVT (nonsustained ventricular tachycardia) (HCC)  Assessment & Plan  Brief run of non-sustained V-tach on cardiopulmonary monitoring on 09/15 late in the afternoon  Patient's EF approximately 6 months ago was preserved  Has remained in AFib and with sepsis, original septic shock  Was also maintained on Levophed and Cardizem gtt  Patient was asymptomatic and episode was very brief  Continue to monitor renal function and electrolytes carefully, and aim for potassium level greater than 4 and magnesium level greater than 2  Continue PO metoprolol    Outpatient follow-up with Cardiology        Hypoalbuminemia  Assessment & Plan  In the setting of cirrhosis  Given patient's septic shock, hypotension, and concurrent low albumin - has received albumin infusions  Chronic right-sided congestive heart failure [facility])  Assessment & Plan  Last ECHO 02/2021 with preserved LVEF, mildly increased LV wall thickness, and mildly reduced RV systolic function  · CT 9/13 with vascular congestion, IVC engorgement consistent with cardiac congestion  · Patient demonstrating weight gain and net positive fluid status  · Has received significant IV fluids in the setting of septic shock, which were discontinued 9/15  · Also with hypoalbuminemia which is contributing to worsening overall extremity edema  · Overall appears volume overloaded, but also with concurrent MARISABEL and low FENA    · Home diuretic therapy in the form of 80 mg furosemide BID has been on hold since admission  · Was initiated on IV furosemide 60 mg BID without adequate diuresis  · Initiated on lasix gtt 9/16 by Nephrology with good diuresis  · Continue to monitor daily weights, I/O's  · Continue the lasix drip/infusion at 10 mg/hr on 09/20/2021    Acute kidney injury superimposed on chronic kidney disease [facility])  Assessment & Plan  Lab Results   Component Value Date    EGFR 37 09/20/2021    EGFR 33 09/19/2021    EGFR 28 09/18/2021    CREATININE 1 88 (H) 09/20/2021    CREATININE 2 11 (H) 09/19/2021    CREATININE 2 40 (H) 09/18/2021   · Present on admission with a creatinine of 2 06, with baseline of 1 2 -1 4 - creatinine had been improving, worsened previously to a value of 2 86  Initial presentation with sepsis and septic shock - presumed prerenal etiology with creatinine improving with IV fluids  Patient with concurrent worsening anemia and hypoalbuminemia as well  Patient also has a history of right-sided and potentially diastolic CHF, and appears to be volume overloaded, with evidence of vascular congestion and IVC engorgement by CT, weight gain, and net positive fluid balance  Patient remains overall volume overloaded but improved - creatinine improved to 2 11 this morning with aggressive diuresis  · Remains off IV fluids  · Attempts at bladder scan unsuccessful due to pain - patient with overlying panniculitis  · Renal ultrasound without evidence of hydronephrosis  · Remains on lasix gtt at 10 mg/hr  · Continue to avoid nephrotoxins, and renally dose medications when appropriate  · Nephrology officially consulted and following  Hyponatremia  Assessment & Plan  Baseline in the 130-140s range, presented with a value of 127 along with concurrent hypochloremia  In the setting of sepsis thought secondary to hypovolemia, but patient appears volume overloaded with sodium unchanged despite IV fluids    Current levels improving with diuresis and normalized today to 140  Low-sodium is very likely due to hypervolemic hyponatremia  Hyponatremia has resolved  Continue to monitor  Results from last 7 days   Lab Units 09/20/21  0457 09/19/21  0602 09/18/21  0553   SODIUM mmol/L 142 140 133*   POTASSIUM mmol/L 3 2* 3 0* 3 1*   CHLORIDE mmol/L 97* 95* 94*   CO2 mmol/L 36* 33* 29   BUN mg/dL 60* 65* 69*   CREATININE mg/dL 1 88* 2 11* 2 40*   CALCIUM mg/dL 8 4 8 5 8 1*         Type 2 diabetes mellitus with hyperglycemia, without long-term current use of insulin [facility])  Assessment & Plan  Lab Results   Component Value Date    HGBA1C 5 5 05/21/2021       Recent Labs     09/19/21  1116 09/19/21  1601 09/20/21  0641 09/20/21  1057   POCGLU 149* 123 157* 152*       Blood Sugar Average: Last 72 hrs:  (P) 142     · Not currently on any diabetic medications  · Insulin sliding scale with blood glucose monitoring ACHS   Outpatient follow-up with PCP in regards to this matter    Permanent atrial fibrillation [facility])  Assessment & Plan  History of AFib, presented with RVR in the setting of sepsis  Chronically on metoprolol succinate twice daily and AC with warfarin  · Required Cardizem gtt for rate control initially - weaned off  · Reintroduced metoprolol with tartrate formulation previously - increased dose again 9/17  · Warfarin was held in the setting of worsening anemia and previously supratherapeutic INR  · Resumed warfarin 9/18  · Continue to monitor heart rate with cardiopulmonary monitoring  · Heart rate is overall improved but remains mildly suboptimal - hold off on further titration of beta-blocker given mild hypotension in the setting of aggressive diuresis  Hypokalemia  Assessment & Plan  · Replete per Nephrology  · Follow the potassium level and magnesium level    Chronic anemia  Assessment & Plan  Known past medical history in the setting of CKD and cirrhosis, as well as colonic AVM's requiring APC in the past, and on chronic AC with warfarin  Baseline Hgb of 7-8  · Worsening anemia in the setting of acute illness  · FOBT negative X 2    · Has required transfusion with PRBCs, currently s/p 3 Units  · Continue to trend H/H - current stable  · Iron studies with low TIBC and elevated ferritin, consistent with anemia of chronic disease  Results from last 7 days   Lab Units 09/20/21  0457 09/19/21  0602 09/18/21  0553   WBC Thousand/uL 4 81 5 74 5 82   HEMOGLOBIN g/dL 8 4* 8 4* 7 8*   HEMATOCRIT % 27 6* 28 3* 25 9*   PLATELETS Thousands/uL 190 184 192         STEFF (obstructive sleep apnea)  Assessment & Plan  · Known past medical history with CPAP compliance at home  Continue CPAP HS per home settings  Chronic venous insufficiency  Assessment & Plan  Chronic venous insufficiency with concurrent hypoalbuminemia, cirrhosis, and right-sided CHF  Patient has also been fluid resuscitated with worsening lower extremity edema  VTE Pharmacologic Prophylaxis: VTE Score: 7 High Risk (Score >/= 5) - Pharmacological DVT Prophylaxis Ordered: warfarin (Coumadin)  Sequential Compression Devices Ordered  Patient Centered Rounds: I performed bedside rounds with nursing staff today  Discussions with Specialists or Other Care Team Provider: I discussed the case with Nephrology  Time Spent for Care: 30 minutes  More than 50% of total time spent on counseling and coordination of care as described above  Current Length of Stay: 7 day(s)  Current Patient Status: Inpatient   Certification Statement: The patient will continue to require additional inpatient hospital stay due to the need for IV antibiotic treatment and for an IV lasix drip/infusion  Discharge Plan: Anticipate discharge in 48-72 hrs to home with home services  Code Status: Level 1 - Full Code    Subjective: The patient was seen and examined  The patient is doing better  No chest pain  No shortness of breath  No abdominal pain  No nausea or vomiting        Objective: Vitals:   Temp (24hrs), Av 8 °F (36 6 °C), Min:97 7 °F (36 5 °C), Max:98 °F (36 7 °C)    Temp:  [97 7 °F (36 5 °C)-98 °F (36 7 °C)] 98 °F (36 7 °C)  HR:  [] 97  Resp:  [16-18] 16  BP: (106-117)/(68-71) 110/69  SpO2:  [95 %-100 %] 98 %  Body mass index is 43 29 kg/m²  Input and Output Summary (last 24 hours): Intake/Output Summary (Last 24 hours) at 2021 1442  Last data filed at 2021 1435  Gross per 24 hour   Intake 540 ml   Output 3500 ml   Net -2960 ml       Physical Exam:   Physical Exam   General:  NAD, follows commands  HEENT:  NC/AT, mucous membranes moist  Neck:  Supple, No JVP elevation  CV:  + S1, + S2, Intermittent tachycardia, Irregularly irregular rhythm  Pulm:  Lung fields are CTA bilaterally  Abd:  Soft, Non-tender, Non-distended  Ext:  No clubbing/cyanosis, Edema of the bilateral lower extremities  Skin:  No rashes  Neuro:  Awake, alert, oriented  Psych:  Normal mood and affect      Additional Data:    Labs:  Results from last 7 days   Lab Units 21  0457 21  0902 21  0436   WBC Thousand/uL 4 81   < > 5 76   HEMOGLOBIN g/dL 8 4*  --  7 4*   HEMATOCRIT % 27 6*  --  24 6*   PLATELETS Thousands/uL 190   < > 209   BANDS PCT %  --   --  2   NEUTROS PCT % 77*   < >  --    LYMPHS PCT % 8*   < >  --    LYMPHO PCT %  --   --  11*   MONOS PCT % 9   < >  --    MONO PCT %  --   --  6   EOS PCT % 3   < > 0    < > = values in this interval not displayed       Results from last 7 days   Lab Units 21  0457   SODIUM mmol/L 142   POTASSIUM mmol/L 3 2*   CHLORIDE mmol/L 97*   CO2 mmol/L 36*   BUN mg/dL 60*   CREATININE mg/dL 1 88*   ANION GAP mmol/L 9   CALCIUM mg/dL 8 4   ALBUMIN g/dL 2 8*   TOTAL BILIRUBIN mg/dL 0 85   ALK PHOS U/L 206*   ALT U/L 19   AST U/L 16   GLUCOSE RANDOM mg/dL 112     Results from last 7 days   Lab Units 21  0457   INR  1 64*     Results from last 7 days   Lab Units 21  1057 21  0641 21  1601 21  1116 09/19/21  0756 09/18/21  1646 09/18/21  1052 09/18/21  0801 09/17/21  2049 09/17/21  1605 09/17/21  1150 09/17/21  0734   POC GLUCOSE mg/dl 152* 157* 123 149* 126 140 134 142* 139 128 166* 148*         Results from last 7 days   Lab Units 09/19/21  0602 09/18/21  0553 09/16/21  0452 09/15/21  1137 09/14/21  0547   PROCALCITONIN ng/ml 1 51* 2 59* 7 42* 8 77* 7 87*       Lines/Drains:  Invasive Devices     Peripheral Intravenous Line            Peripheral IV 09/18/21 Right Forearm 2 days    Peripheral IV 09/18/21 Right;Upper Arm 2 days                      Imaging: No pertinent imaging reviewed  Recent Cultures (last 7 days):         Last 24 Hours Medication List:   Current Facility-Administered Medications   Medication Dose Route Frequency Provider Last Rate    cefepime  1,000 mg Intravenous Q24H Cary Vieyra MD 1,000 mg (09/20/21 0912)    furosemide  10 mg/hr Intravenous Continuous Cary Vieyra MD 10 mg/hr (09/19/21 0629)    HYDROmorphone  0 5 mg Intravenous Q6H PRN Cary Vieyra MD      insulin lispro  1-6 Units Subcutaneous TID [facility], DO      insulin lispro  1-6 Units Subcutaneous HS Brendan Lawrence DO      metoprolol tartrate  50 mg Oral Q12H [address] Cary Vieyra MD      midodrine  5 mg Oral TID AC TRES Trotter      nicotine  1 patch Transdermal Daily Cary Vieyra MD      ondansetron  4 mg Intravenous Q6H PRN Cary Vieyra MD      pantoprazole  40 mg Oral Early Morning Cary Vieyra MD      polyethylene glycol  17 g Oral Daily PRN Cary Vieyra MD      potassium chloride  40 mEq Oral Q4H TRES Trotter      vancomycin  1,250 mg Intravenous Q48H Cary Vieyra MD 1,250 mg (09/20/21 1001)    warfarin  5 mg Oral Daily (warfarin) Kelsey Colon DO          Today, Patient Was Seen By: Kelsey Colon DO    **Please Note: This note may have been constructed using a voice recognition system  **

## 2021-09-20 NOTE — ASSESSMENT & PLAN NOTE
Last ECHO 02/2021 with preserved LVEF, mildly increased LV wall thickness, and mildly reduced RV systolic function  · CT 9/13 with vascular congestion, IVC engorgement consistent with cardiac congestion  · Patient demonstrating weight gain and net positive fluid status  · Has received significant IV fluids in the setting of septic shock, which were discontinued 9/15  · Also with hypoalbuminemia which is contributing to worsening overall extremity edema  · Overall appears volume overloaded, but also with concurrent MARISABEL and low FENA  · Home diuretic therapy in the form of 80 mg furosemide BID has been on hold since admission  · Was initiated on IV furosemide 60 mg BID without adequate diuresis  · Initiated on lasix gtt 9/16 by Nephrology with good diuresis    · Continue to monitor daily weights, I/O's  · Continue the lasix drip/infusion at 10 mg/hr on 09/20/2021

## 2021-09-20 NOTE — PROGRESS NOTES
NEPHROLOGY PROGRESS NOTE   Jigar Roth 57 y o  male MRN: [id number]  Unit/Bed#: 223-15 Encounter: [id number]    Assessment/Plan:    Jigar Roth is a 57 y o  male whose pertinent medical problems include CKD 3, CdHF, a-fib, cirrhosis, obesity, lymphedema admitted 9/13/21 with chief complaint of abdominal pain being treated for septic shock (poa/resolved) secondary to panniculitis  Renal following along for acute kidney injury atop chronic kidney disease, cardiorenal syndrome, electrolyte management  1  Acute kidney injury (POA) atop chronic kidney disease  · Presented with a creatinine of 2 06 mg/dL -> 2 7 mg/dL -> 1 8 mg/dL today  Etiology is ATN in the setting of hypotension/shock/vancomycin nephrotoxicity with secondary rise in creatinine due to cardiorenal syndrome  · Continue aggressive diuresis with Lasix infusion 10 mg per hour with plan to transition to bolus later in the week  · Continue to avoid potential nephrotoxins, maximize hemodynamics and provide supportive care  2  Stage 3 chronic kidney disease with baseline creatinine around 1 2 mg/dL  3  Cardiorenal syndrome type 1, recurrent  · Patient's dry weight is about 300 lb  He is getting closer to goal weight  Continue Lasix infusion at 10 milligrams/hours above  Continue daily weights, strict I&O, sodium restriction  Monitor electrolytes closely  4  Acute on chronic diastolic congestive heart failure - as per #3  5  Hypervolemic hyponatremia - resolved  6  Hypokalemia   · Continue aggressive potassium supplementation in the setting of Lasix infusion  Will provide patient with a total of 120 mEq of oral potassium today  7  Septic shock (POA/shock resolved) due to panniculitis  · Management per primary team  8  Hypotension  · Decrease midodrine 5 mg t i d  Mean arterial pressure has been greater than 80 mmHg for last 36 hours  9  Cirrhosis of liver  10   Atrial fibrillation with a rapid ventricular response-rate controlled    ROS  States he is tired today because he has been up since 4:00 a m     A complete 10 point review of systems have been performed and are otherwise negative  Historical Information   Past Medical History:   Diagnosis Date    A-fib [facility])     Acute renal failure superimposed on stage 3b chronic kidney disease ([facility] ) 03/15/2021    Cardiac disease     Chronic combined systolic (congestive) and diastolic (congestive) heart failure (HCC)     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 ascites (HCC)     Class 3 severe obesity due to excess calories with serious comorbidity and body mass index (BMI) of 45 0 to 49 9 in adult [facility]) 10/17/2018    Diabetes mellitus ([facility] )     Dilated cardiomyopathy ([place] [id number] )     History of echocardiogram 11/24/2014    EF 0 30 (30%), Likely mod LV systolic dysfunction  Likely RV dysfunction as well   History of Lexiscan MPI 02/19/2016    EF 0 43 (43%), no prior MI or ischemia   Hx of echocardiogram 04/28/2017    Normal EF, Normal LV systolic function  Mild concentric LV hypertrophy  Mild mitral and tricuspid regurg   Hx: recurrent pneumonia     Hypertension     Hypokalemia 09/04/2020    Long term (current) use of anticoagulants     Morbid (severe) obesity due to excess calories (HCC)     Neuropathy     Pleural effusion on right     Stage 3 chronic kidney disease [facility])      Past Surgical History:   Procedure Laterality Date    HERNIA REPAIR      IR CHEST TUBE CHECK/CHANGE/REPOSITION/UPSIZE  5/27/2021    IR CHEST TUBE PLACEMENT  5/22/2021    IR THORACENTESIS  8/25/2020    IR THORACENTESIS  2/23/2021    LUNG DECORTICATION Right 6/2/2021    Procedure: DECORTICATION LUNG;  Surgeon: Romelia Alcantar MD;  Location: [facility];  Service: Thoracic    SPLENECTOMY, TOTAL      THORACOSCOPY VIDEO ASSISTED SURGERY (VATS) Right 6/2/2021    Procedure: THORACOSCOPY VIDEO ASSISTED SURGERY (VATS);   Surgeon: Romelia Alcantar MD;  Location: [facility];  Service: Thoracic    VASCULAR SURGERY Right     leg Social History   Social History     Substance and Sexual Activity   Alcohol Use Yes    Comment: 4 or 5 drinks weekly/bloody chani's     Social History     Substance and Sexual Activity   Drug Use Never     Social History     Tobacco Use   Smoking Status Current Some Day Smoker    Packs/day: 0 25    Types: Cigarettes   Smokeless Tobacco Never Used   Tobacco Comment    smokes 1 pack per week and a half        Family History:   History reviewed  No pertinent family history  Medications:  Pertinent medications were reviewed  Current Facility-Administered Medications   Medication Dose Route Frequency Provider Last Rate    cefepime  1,000 mg Intravenous Q24H Ernst Rutledge MD 1,000 mg (09/20/21 0912)    furosemide  10 mg/hr Intravenous Continuous Ernst Rutledge MD 10 mg/hr (09/19/21 4347)    HYDROmorphone  0 5 mg Intravenous Q6H PRN Ernst Rutledge MD      metoprolol tartrate  50 mg Oral Q12H [facility] Ernst Rutledge MD      midodrine  7 5 mg Oral TID AC Ernst Rutledge MD      nicotine  1 patch Transdermal Daily Ernst Rutledge MD      ondansetron  4 mg Intravenous Q6H PRN Ernst Rutledge MD      pantoprazole  40 mg Oral Early Morning Ernst Rutledge MD      polyethylene glycol  17 g Oral Daily PRN Ernst Rutledge MD      vancomycin  1,250 mg Intravenous Q48H Ernst Rutledge MD 1,250 mg (09/20/21 1001)    warfarin  5 mg Oral Once per day on Mon Wed Fri Ernst Rutledge MD      And    warfarin  2 5 mg Oral Once per day on Sun Tue Thu Sat Ernst Rutledge MD           No Known Allergies      Vitals:   /69   Pulse 97   Temp 98 °F (36 7 °C)   Resp 16   Ht 6' (1 829 m)   Wt (!) 145 kg (319 lb 3 6 oz)   SpO2 98%   BMI 43 29 kg/m²   Body mass index is 43 29 kg/m²    SpO2: 98 %,   SpO2 Activity: At Rest,   O2 Device: CPAP      Intake/Output Summary (Last 24 hours) at 9/20/2021 1342  Last data filed at 9/20/2021 1206  Gross per 24 hour   Intake 540 ml   Output 3550 ml   Net -3010 ml Invasive Devices     Peripheral Intravenous Line            Peripheral IV 09/18/21 Right Forearm 2 days    Peripheral IV 09/18/21 Right;Upper Arm 2 days                Physical Exam  General: conscious, cooperative, in no acute distress  Eyes: conjunctivae pink, anicteric sclerae  ENT: lips and mucous membranes moist  Neck: supple, no JVD, no masses  Chest:  Essentially clear breath sounds bilaterally, no crackles, ronchus or wheezings  CVS: S1 & S2, normal rate, regular rhythm  Abdomen: soft, non-tender, non-distended, normoactive bowel sounds obese  Extremities:  Moderate to severe edema of both legs  Skin: no rash  Neuro: awake, alert, oriented      Diagnostic Data:  Lab: I have personally reviewed pertinent lab results  ,   CBC:  Results from last 7 days   Lab Units 09/20/21  0457   WBC Thousand/uL 4 81   HEMOGLOBIN g/dL 8 4*   HEMATOCRIT % 27 6*   PLATELETS Thousands/uL 190      CMP:   Lab Results   Component Value Date    SODIUM 142 09/20/2021    K 3 2 (L) 09/20/2021    CL 97 (L) 09/20/2021    CO2 36 (H) 09/20/2021    BUN 60 (H) 09/20/2021    CREATININE 1 88 (H) 09/20/2021    CALCIUM 8 4 09/20/2021    AST 16 09/20/2021    ALT 19 09/20/2021    ALKPHOS 206 (H) 09/20/2021    EGFR 37 09/20/2021   ,   PT/INR:   Lab Results   Component Value Date    INR 1 64 (H) 09/20/2021   ,   Magnesium: No components found for: MAG,  Phosphorous: No results found for: PHOS    Microbiology:  @LABRCNTIP,(urinecx:7)@        TRES Bell    Portions of the record may have been created with voice recognition software  Occasional wrong word or "sound a like" substitutions may have occurred due to the inherent limitations of voice recognition software  Read the chart carefully and recognize, using context, where substitutions have occurred

## 2021-09-20 NOTE — ASSESSMENT & PLAN NOTE
Lab Results   Component Value Date    HGBA1C 5 5 05/21/2021     Recent Labs     09/19/21  1116 09/19/21  1601 09/20/21  0641 09/20/21  1057   POCGLU 149* 123 157* 152*     Does not appear to be in any glycemic agents as an outpatient, with A1c in the normal range  Continue to monitor blood sugars

## 2021-09-20 NOTE — PLAN OF CARE
Problem: PHYSICAL THERAPY ADULT  Goal: Performs mobility at highest level of function for planned discharge setting  See evaluation for individualized goals  Description: Treatment/Interventions: Functional transfer training, LE strengthening/ROM, Therapeutic exercise, Endurance training, Patient/family training, Bed mobility, Gait training          See flowsheet documentation for full assessment, interventions and recommendations  Outcome: Progressing  Note: Prognosis: Good  Problem List: Decreased strength, Decreased endurance, Impaired balance, Decreased mobility, Obesity, Pain  Assessment: Pt seen for PT treatment session this date with interventions consisting of transfer training, TE, gait training, toilet transfers, and education  Pt agreeable to PT treatment session upon arrival, pt found sitting OOB in recliner  At end of session, pt left sitting OOB in recliner wih all needs in reach  In comparison to previous session, pt with improvements in ambulation distance  Continue to recommend OP PT at time of d/c in order to maximize pt's functional independence and safety w/ mobility  Pt continues to be functioning below baseline level  PT will continue to see pt while here in order to address the deficits listed above and provide interventions consistent w/ POC in effort to achieve STGs  PT Discharge Recommendation: Home with outpatient rehabilitation     PT - OK to Discharge: Yes    See flowsheet documentation for full assessment

## 2021-09-20 NOTE — ASSESSMENT & PLAN NOTE
History of AFib, presented with RVR in the setting of sepsis  Chronically on metoprolol succinate twice daily and AC with warfarin  · Required Cardizem gtt for rate control initially - weaned off  · Reintroduced metoprolol with tartrate formulation previously - increased dose again 9/17  · Warfarin was held in the setting of worsening anemia and previously supratherapeutic INR  · Resumed warfarin 9/18  · Continue to monitor heart rate with cardiopulmonary monitoring  · Heart rate is overall improved but remains mildly suboptimal - hold off on further titration of beta-blocker given mild hypotension in the setting of aggressive diuresis

## 2021-09-20 NOTE — PHYSICAL THERAPY NOTE
PHYSICAL THERAPY TREATMENT NOTE  NAME:  Chuckie Christianson  DATE: 09/20/21    Length Of Stay: 7  Performed at least 2 patient identifiers during session: Name and ID bracelet    TREATMENT FLOWSHEET:    09/20/21 0943   PT Last Visit   PT Visit Date 09/20/21   Note Type   Note Type Treatment   Pain Assessment   Pain Assessment Tool 0-10   Pain Score 5   Pain Location/Orientation Orientation: Bilateral;Location: Foot   Pain Onset/Description Onset: Ongoing   Patient's Stated Pain Goal No pain   Hospital Pain Intervention(s) Repositioned; Ambulation/increased activity   Restrictions/Precautions   Weight Bearing Precautions Per Order No   Other Precautions Multiple lines;Telemetry; Fall Risk;Pain   General   Chart Reviewed Yes   Response to Previous Treatment Patient with no complaints from previous session  Family/Caregiver Present No   Cognition   Overall Cognitive Status WFL   Arousal/Participation Alert; Cooperative   Attention Within functional limits   Orientation Level Oriented X4   Memory Within functional limits   Following Commands Follows all commands and directions without difficulty   Subjective   Subjective "My feet hurt me all the time, I have neuropathy and can't take the meds cause they give me nightmares "   Transfers   Sit to Stand 5  Supervision   Additional items Assist x 1; Armrests; Increased time required   Stand to Sit 5  Supervision   Additional items Assist x 1; Armrests; Increased time required   Stand pivot 5  Supervision   Additional items Assist x 1; Armrests; Increased time required  [facility])   Toilet transfer 5  Supervision   Additional items Assist x 1; Increased time required;Standard toilet  (I with clothing management and hygiene needs)   Ambulation/Elevation   Gait pattern Improper Weight shift;Decreased foot clearance; Short stride; Excessively slow; Foward flexed   Gait Assistance 5  Supervision   Additional items Assist x 1;Verbal cues   Assistive Device SPC   Distance 90 ft and 15ftx2   Stair Management Assistance Not tested   Balance   Static Sitting Good   Dynamic Sitting Good   Static Standing Fair +   Dynamic Standing Fair +   Ambulatory Fair -   Endurance Deficit   Endurance Deficit Yes   Activity Tolerance   Activity Tolerance Patient limited by fatigue;Patient limited by pain   Exercises   Quad Sets Sitting;20 reps;AROM; Bilateral   Heelslides Sitting;20 reps;AROM; Bilateral   Glute Sets Sitting;20 reps;AROM; Bilateral   Hip Flexion Sitting;20 reps;AROM; Bilateral   Hip Abduction Sitting;20 reps;AROM; Bilateral   Hip Adduction Sitting;20 reps;AROM; Bilateral   Knee AROM Long Arc Quad Sitting;20 reps;AROM; Bilateral   Ankle Pumps Sitting;20 reps;AROM; Bilateral   Marching Sitting;20 reps;AROM; Bilateral   Assessment   Prognosis Good   Problem List Decreased strength;Decreased endurance; Impaired balance;Decreased mobility;Obesity;Pain   Goals   Patient Goals to go home   PT Treatment Day 2   Plan   Treatment/Interventions Functional transfer training;LE strengthening/ROM; Therapeutic exercise; Endurance training;Patient/family training;Bed mobility;Gait training   Progress Progressing toward goals   PT Frequency   (3-5x/wk)   Recommendation   PT Discharge Recommendation Home with outpatient rehabilitation   PT - OK to Discharge Yes   Additional Comments when medically stable   [facility] Basic Mobility Inpatient   Turning in Bed Without Bedrails 3   Lying on Back to Sitting on Edge of Flat Bed 3   Moving Bed to Chair 4   Standing Up From Chair 4   Walk in Room 4   Climb 3-5 Stairs 3   Basic Mobility Inpatient Raw Score 21   Basic Mobility Standardized Score 45 55       [facility] Raw Score is 21, Standardized Score is 45 55  A standardized score greater than 42 9 suggests the patient may benefit from discharge to home  Please also refer to the recommendation of the Physical Therapist for safe discharge planning      Pt seen for PT treatment session this date with interventions consisting of transfer training, TE, gait training, toilet transfers, and education  Pt agreeable to PT treatment session upon arrival, pt found sitting OOB in recliner  At end of session, pt left sitting OOB in recliner wih all needs in reach  In comparison to previous session, pt with improvements in ambulation distance  Continue to recommend OP PT at time of d/c in order to maximize pt's functional independence and safety w/ mobility  Pt continues to be functioning below baseline level  PT will continue to see pt while here in order to address the deficits listed above and provide interventions consistent w/ POC in effort to achieve STGs      Ramesh Patrick, PTA

## 2021-09-21 NOTE — UTILIZATION REVIEW
Continued Stay Review    Date: 5- 21-21                     Current Patient Class:  Inpatient  Current Level of Care:  Med surg     HPI:61 y o  male initially admitted on  9-13-21     Assessment/Plan:     Renal function continues to improve  Continue aggressive diuresis at increased rate  Continue iv cefepime and iv vancomycin  Give 40 meq kcl today for a total of 80 meq due to aggressive diuresis       Vital Signs:         Vitals:    09/21/21 0600 09/21/21 0709 09/21/21 1114 09/21/21 1427   BP:  104/68 106/70 105/69   BP Location:  Left arm  Left arm   Pulse:  (!) 108 (!) 116 102   Resp:  19 18   Temp:  98 2 °F (36 8 °C)  98 6 °F (37 °C)   TempSrc:  Oral  Oral   SpO2:  100% 97% 98%   Weight: (!) 144 kg (318 lb 2 oz)      Height:           Pertinent Labs/Diagnostic Results:   Results from last 7 days   Lab Units 09/20/21 2256   SARS-COV-2  Negative     Results from last 7 days   Lab Units 09/21/21  0512 09/20/21  0457 09/19/21  0602 09/18/21  0553 09/17/21  2034 09/17/21  0436   WBC Thousand/uL 3 33* 4 81 5 74 5 82  --  5 76   HEMOGLOBIN g/dL 8 0* 8 4* 8 4* 7 8* 7 8* 7 4*   HEMATOCRIT % 26 7* 27 6* 28 3* 25 9* 25 8* 24 6*   PLATELETS Thousands/uL 195 190 184 192  --  209   NEUTROS ABS Thousands/µL 2 47 3 73 4 58  --   --   --    BANDS PCT %  --   --   --   --   --  2         Results from last 7 days   Lab Units 09/21/21  0512 09/20/21  0457 09/19/21  0602 09/18/21  0553 09/17/21  0436   SODIUM mmol/L 142 142 140 133* 131*   POTASSIUM mmol/L 3 6 3 2* 3 0* 3 1* 4 0   CHLORIDE mmol/L 99* 97* 95* 94* 93*   CO2 mmol/L 36* 36* 33* 29 28   ANION GAP mmol/L 7 9 12 10 10   BUN mg/dL 55* 60* 65* 69* 63*   CREATININE mg/dL 1 69* 1 88* 2 11* 2 40* 2 68*   EGFR ml/min/1 73sq m 43 37 33 28 24   CALCIUM mg/dL 8 4 8 4 8 5 8 1* 8 1*   MAGNESIUM mg/dL 1 7 1 8 1 8 2 0 2 1   PHOSPHORUS mg/dL 3 8  --   --   --   --      Results from last 7 days   Lab Units 09/21/21  0512 09/20/21  0457 09/19/21  0602 09/18/21  9583 09/17/21  0436 09/16/21  0452   AST U/L 18 16 14 14 11 12   ALT U/L 17 19 12 15 15 9*   ALK PHOS U/L 190* 206* 194* 164* 152* 171*   TOTAL PROTEIN g/dL 6 5 6 7 6 9 6 3* 6 6 7 2   ALBUMIN g/dL 2 7* 2 8* 2 9* 2 7* 3 0* 2 7*   TOTAL BILIRUBIN mg/dL 0 82 0 85 0 76 0 72 0 86 1 10*   BILIRUBIN DIRECT mg/dL  --  0 38* 0 39* 0 37* 0 48* 0 61*     Results from last 7 days   Lab Units 09/21/21  1549 09/21/21  1056 09/21/21  0708 09/20/21  2051 09/20/21  1521 09/20/21  1057 09/20/21  0641 09/19/21  1601 09/19/21  1116 09/19/21  0756 09/18/21  1646 09/18/21  1052   POC GLUCOSE mg/dl 135 168* 124 146* 143* 152* 157* 123 149* 126 140 134     Results from last 7 days   Lab Units 09/21/21  0512 09/20/21  0457 09/19/21  0602 09/18/21  0553 09/17/21  0436 09/16/21  0452 09/15/21  1706 09/15/21  0520   GLUCOSE RANDOM mg/dL 119 112 118 137 122 119 166* 150*     Results from last 7 days   Lab Units 09/15/21  1706   OSMOLALITY, SERUM mmol/         No results found for: BETA-HYDROXYBUTYRATE       Results from last 7 days   Lab Units 09/17/21  1401   PH ALISIA  7 345   PCO2 ALISIA mm Hg 47 5   PO2 ALISIA mm Hg 46 1*   HCO3 ALISIA mmol/L 25 3   BASE EXC ALISIA mmol/L -0 5   O2 CONTENT ALISIA ml/dL 9 6   O2 HGB, VENOUS % 73 7         Results from last 7 days   Lab Units 09/21/21  0512   CK TOTAL U/L 9*             Results from last 7 days   Lab Units 09/21/21  0512 09/20/21  0457 09/19/21  0602   PROTIME seconds 18 3* 18 5* 20 0*   INR  1 61* 1 64* 1 81*         Results from last 7 days   Lab Units 09/21/21  0512 09/20/21  0457 09/19/21  0602 09/18/21  0553 09/16/21  0452   PROCALCITONIN ng/ml 0 67* 0 95* 1 51* 2 59* 7 42*     Results from last 7 days   Lab Units 09/15/21  1706   CLARITY UA  Clear   COLOR UA  Yellow   SPEC GRAV UA  1 025   PH UA  5 0   GLUCOSE UA mg/dl Negative   KETONES UA mg/dl 15 (1+)*   BLOOD UA  Negative   PROTEIN UA mg/dl Negative   NITRITE UA  Negative   BILIRUBIN UA  Interference- unable to analyze*   UROBILINOGEN UA E U /dl 0 2 LEUKOCYTES UA  Negative   WBC UA /hpf None Seen   RBC UA /hpf 0-1*   BACTERIA UA /hpf Occasional   EPITHELIAL CELLS WET PREP /hpf Occasional   SODIUM UR  <5   CREATININE UR mg/dL 194 0  194 0  194 0   PROTEIN UR mg/dL 82   PROT/CREAT RATIO UR  0 42*       Scheduled Medications:  acetaZOLAMIDE, 250 mg, Oral, Q12H GERRY  cefepime, 1,000 mg, Intravenous, Q24H  insulin lispro, 1-6 Units, Subcutaneous, TID AC  insulin lispro, 1-6 Units, Subcutaneous, HS  metoprolol tartrate, 50 mg, Oral, Q12H GERRY  midodrine, 5 mg, Oral, TID AC  nicotine, 1 patch, Transdermal, Daily  pantoprazole, 40 mg, Oral, Early Morning  vancomycin, 1,250 mg, Intravenous, Q48H  warfarin, 7 mg, Oral, Daily (warfarin)      Continuous IV Infusions:  furosemide, 20 mg/hr, Intravenous, Continuous      PRN Meds:  HYDROmorphone, 0 5 mg, Intravenous, Q6H PRN  ondansetron, 4 mg, Intravenous, Q6H PRN  polyethylene glycol, 17 g, Oral, Daily PRN        Discharge Plan: to be determined     Network Utilization Review Department  ATTENTION: Please call with any questions or concerns to [phone] and carefully listen to the prompts so that you are directed to the right person  All voicemails are confidential   [facility] all requests for admission clinical reviews, approved or denied determinations and any other requests to dedicated fax number below belonging to the campus where the patient is receiving treatment   List of dedicated fax numbers for the Facilities:  [address] DENIALS (Administrative/Medical Necessity) [phone]   [address] (Maternity/NICU/Pediatrics) [phone]   [address] [place] [address] [address] [id number] [place] [address] [address] [place] [address] [address] [phone]

## 2021-09-21 NOTE — ASSESSMENT & PLAN NOTE
Presented with lower abdominal pain onset on the night prior to admission  Sepsis criteria present with tachycardia, tachypnea, hypotension, leukocytosis, and fever, with associated organ dysfunction with evidence of MARISABEL  Source presumed to be skin and soft tissue in patient with evidence of panniculitis  Sepsis has resolved  · CT with dermal thickening and subcutaneous edema in the ventral lower pannus without collection, soft tissue gas, or inflammatory changes extending into the perineum  · Continue broad-spectrum coverage with vancomycin and cefepime, day #9  · Initially on IV fluids - discontinued  · Levophed successfully weaned off  · Right-sided IJ TLC discontinued   Blood cultures with no growth X 5 days   Procalcitonin elevated 3 89 --> 7 87, 8 77, 7 42, 2 59, 1 51, 0 67   Was initiated on midodrine, octreotide, and additional albumin by Critical Care Medicine previously  All discontinued  [address] and Nephrology consulted and following

## 2021-09-21 NOTE — PROGRESS NOTES
[address] [facility] [address]  Progress Note - Gregorio Tamayo 1960, 57 y o  male MRN: [id number]  Unit/Bed#: 189-76 Encounter: [id number]  Primary Care Provider: Loida Boles DO   Date and time admitted to hospital: 9/13/2021  9:51 AM    * Severe sepsis with septic shock [facility])  Assessment & Plan  Presented with lower abdominal pain onset on the night prior to admission  Sepsis criteria present with tachycardia, tachypnea, hypotension, leukocytosis, and fever, with associated organ dysfunction with evidence of MARISABEL  Source presumed to be skin and soft tissue in patient with evidence of panniculitis  Sepsis has resolved  · CT with dermal thickening and subcutaneous edema in the ventral lower pannus without collection, soft tissue gas, or inflammatory changes extending into the perineum  · Continue broad-spectrum coverage with vancomycin and cefepime, day #9  · Initially on IV fluids - discontinued  · Levophed successfully weaned off  · Right-sided IJ TLC discontinued   Blood cultures with no growth X 5 days   Procalcitonin elevated 3 89 --> 7 87, 8 77, 7 42, 2 59, 1 51, 0 67   Was initiated on midodrine, octreotide, and additional albumin by Critical Care Medicine previously  All discontinued  [address] and Nephrology consulted and following  Panniculitis  Assessment & Plan  · Please see the plan for "Severe sepsis with septic shock"    Pleural effusion, right  Assessment & Plan  History of a chronic, partially loculated right pleural effusion with associated atelectasis at the right lung base  He was admitted from 05/26 through 06/09/2021 for this, and transferred to [facility] - treated with chest tube with infusion of tPA  Repeat scan on 05/30 revealed a loculated right hydropneumothorax, after which he underwent a right-sided thoracoscopic decortication 06/02/2021    He was scheduled for thoracentesis via IR on 09/14 - given clinical picture decided to hold off on thoracentesis  CT of the chest shows a large chronic right pleural effusion with probable partial loculations  Will continue to hold off repeat tap and monitor  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 ascites ([facility] [address] )  Assessment & Plan  Known past medical history secondary to alcoholic and fatty liver disease  · Follows outpatient with Gastroenterology; last seen on 08/04/2021  · Not a candidate for liver transplant given BMI and comorbidities  · Avoid hepatotoxic medications  · Serial lab monitoring    NSVT (nonsustained ventricular tachycardia) (HCC)  Assessment & Plan  Brief run of non-sustained V-tach on cardiopulmonary monitoring on 09/15 late in the afternoon  Patient's EF approximately 6 months ago was preserved  Has remained in AFib and with sepsis, original septic shock  Was also maintained on Levophed and Cardizem gtt  Patient was asymptomatic and episode was very brief  Continue to monitor renal function and electrolytes carefully, and aim for potassium level greater than 4 and magnesium level greater than 2  Continue PO metoprolol    Outpatient follow-up with Cardiology        Hypoalbuminemia  Assessment & Plan  In the setting of cirrhosis  Given patient's septic shock, hypotension, and concurrent low albumin - has received albumin infusions  Chronic right-sided congestive heart failure [facility])  Assessment & Plan  Last ECHO 02/2021 with preserved LVEF, mildly increased LV wall thickness, and mildly reduced RV systolic function  · CT 9/13 with vascular congestion, IVC engorgement consistent with cardiac congestion  · Patient demonstrating weight gain and net positive fluid status  · Has received significant IV fluids in the setting of septic shock, which were discontinued 9/15  · Also with hypoalbuminemia which is contributing to worsening overall extremity edema  · Overall appears volume overloaded, but also with concurrent MARISABEL and low FENA    · Home diuretic therapy in the form of 80 mg furosemide BID has been on hold since admission  · Was initiated on IV furosemide 60 mg BID without adequate diuresis  · Initiated on lasix gtt 9/16 by Nephrology with good diuresis  · Continue to monitor daily weights, I/O's  · Increased the lasix drip/infusion to 20 mg/hr on 09/21/2021  · Dry weight approximately 300 lbs  Acute kidney injury superimposed on chronic kidney disease [facility])  Assessment & Plan  Lab Results   Component Value Date    EGFR 43 09/21/2021    EGFR 37 09/20/2021    EGFR 33 09/19/2021    CREATININE 1 69 (H) 09/21/2021    CREATININE 1 88 (H) 09/20/2021    CREATININE 2 11 (H) 09/19/2021   · Present on admission with a creatinine of 2 06, with baseline of 1 2 -1 4 - creatinine had been improving, worsened previously to a value of 2 86  Initial presentation with sepsis and septic shock - presumed prerenal etiology with creatinine improving with IV fluids  Patient with concurrent worsening anemia and hypoalbuminemia as well  Patient also has a history of right-sided and potentially diastolic CHF, and appears to be volume overloaded, with evidence of vascular congestion and IVC engorgement by CT, weight gain, and net positive fluid balance  Patient remains overall volume overloaded but improved - creatinine improved to 2 11 this morning with aggressive diuresis  · Remains off IV fluids  · Attempts at bladder scan unsuccessful due to pain - patient with overlying panniculitis  · Renal ultrasound without evidence of hydronephrosis  · Increased the lasix drip/infusion to 20 mg/hr on 09/21/2021  · Dry weight approximately 300 lbs  · Continue to avoid nephrotoxins, and renally dose medications when appropriate  · Nephrology officially consulted and following  Hyponatremia  Assessment & Plan  Baseline in the 130-140s range, presented with a value of 127 along with concurrent hypochloremia    In the setting of sepsis thought secondary to hypovolemia, but patient appears volume overloaded with sodium unchanged despite IV fluids  Current levels improving with diuresis and normalized today to 140  Low-sodium is very likely due to hypervolemic hyponatremia  Hyponatremia has resolved  Continue to monitor  Results from last 7 days   Lab Units 09/21/21  0512 09/20/21  0457 09/19/21  0602   SODIUM mmol/L 142 142 140   POTASSIUM mmol/L 3 6 3 2* 3 0*   CHLORIDE mmol/L 99* 97* 95*   CO2 mmol/L 36* 36* 33*   BUN mg/dL 55* 60* 65*   CREATININE mg/dL 1 69* 1 88* 2 11*   CALCIUM mg/dL 8 4 8 4 8 5         Type 2 diabetes mellitus with hyperglycemia, without long-term current use of insulin [facility])  Assessment & Plan  Lab Results   Component Value Date    HGBA1C 5 5 05/21/2021       Recent Labs     09/20/21  2051 09/21/21  0708 09/21/21  1056 09/21/21  1549   POCGLU 146* 124 168* 135       Blood Sugar Average: Last 72 hrs:  (P) 141 [id number]     · Not currently on any diabetic medications  · Insulin sliding scale with blood glucose monitoring ACHS   Outpatient follow-up with PCP in regards to this matter    Permanent atrial fibrillation [facility])  Assessment & Plan  History of AFib, presented with RVR in the setting of sepsis  Chronically on metoprolol succinate twice daily and AC with warfarin  · Required Cardizem gtt for rate control initially - weaned off  · Reintroduced metoprolol with tartrate formulation previously - increased dose again 9/17  · Warfarin was held in the setting of worsening anemia and previously supratherapeutic INR  · Resumed warfarin 9/18  · Increase coumadin to 7 mg PO Qdaily on 09/21/2021  · Continue to monitor heart rate with cardiopulmonary monitoring  · Heart rate is overall improved but remains mildly suboptimal - hold off on further titration of beta-blocker given mild hypotension in the setting of aggressive diuresis      Hypokalemia  Assessment & Plan  · Replete per Nephrology  · Follow the potassium level and magnesium level    Chronic anemia  Assessment & Plan  Known past medical history in the setting of CKD and cirrhosis, as well as colonic AVM's requiring APC in the past, and on chronic AC with warfarin  Baseline Hgb of 7-8  · Worsening anemia in the setting of acute illness  · FOBT negative X 2    · Has required transfusion with PRBCs, currently s/p 3 Units  · Continue to trend H/H - current stable  · Iron studies with low TIBC and elevated ferritin, consistent with anemia of chronic disease  Results from last 7 days   Lab Units 09/21/21  0512 09/20/21  0457 09/19/21  0602   WBC Thousand/uL 3 33* 4 81 5 74   HEMOGLOBIN g/dL 8 0* 8 4* 8 4*   HEMATOCRIT % 26 7* 27 6* 28 3*   PLATELETS Thousands/uL 195 190 184         STEFF (obstructive sleep apnea)  Assessment & Plan  · Known past medical history with CPAP compliance at home  Continue CPAP HS per home settings  Chronic venous insufficiency  Assessment & Plan  Chronic venous insufficiency with concurrent hypoalbuminemia, cirrhosis, and right-sided CHF  Patient has also been fluid resuscitated with worsening lower extremity edema  VTE Pharmacologic Prophylaxis: VTE Score: 7 High Risk (Score >/= 5) - Pharmacological DVT Prophylaxis Ordered: warfarin (Coumadin)  Sequential Compression Devices Ordered  Patient Centered Rounds: I performed bedside rounds with nursing staff today  Discussions with Specialists or Other Care Team Provider: I discussed the case with Nephrology  Time Spent for Care: 30 minutes  More than 50% of total time spent on counseling and coordination of care as described above  Current Length of Stay: 8 day(s)  Current Patient Status: Inpatient   Certification Statement: The patient will continue to require additional inpatient hospital stay due to the need for an IV lasix drip/infusion and for IV antibiotic therapy  Discharge Plan: Anticipate discharge in 48-72 hrs to home with home services      Code Status: Level 1 - Full Code    Subjective: The patient was seen and examined  The patient is doing better  No chest pain  No shortness of breath  No abdominal pain  No nausea or vomiting  Objective:     Vitals:   Temp (24hrs), Av 3 °F (36 8 °C), Min:98 1 °F (36 7 °C), Max:98 6 °F (37 °C)    Temp:  [98 1 °F (36 7 °C)-98 6 °F (37 °C)] 98 6 °F (37 °C)  HR:  [102-123] 102  Resp:  [18-19] 18  BP: (104-113)/(64-70) 105/69  SpO2:  [95 %-100 %] 98 %  Body mass index is 43 15 kg/m²  Input and Output Summary (last 24 hours): Intake/Output Summary (Last 24 hours) at 2021 1701  Last data filed at 2021 1602  Gross per 24 hour   Intake 1600 ml   Output 2900 ml   Net -1300 ml       Physical Exam:   Physical Exam   General:  NAD, follows commands  HEENT:  NC/AT, mucous membranes moist  Neck:  Supple, No JVP elevation  CV:  + S1, + S2, Tachycardic, Irregularly irregular rhythm  Pulm:  Lung fields are CTA bilaterally  Abd:  Soft, Non-tender, Non-distended  Ext:  No clubbing/cyanosis, Improving edema of the bilateral lower extremities  Skin:  No rashes  Neuro:  Awake, alert, oriented  Psych:  Normal mood and affect      Additional Data:    Labs:  Results from last 7 days   Lab Units 21  0512 21  0902 21  0436   WBC Thousand/uL 3 33*   < > 5 76   HEMOGLOBIN g/dL 8 0*  --  7 4*   HEMATOCRIT % 26 7*  --  24 6*   PLATELETS Thousands/uL 195   < > 209   BANDS PCT %  --   --  2   NEUTROS PCT % 74   < >  --    LYMPHS PCT % 11*   < >  --    LYMPHO PCT %  --   --  11*   MONOS PCT % 8   < >  --    MONO PCT %  --   --  6   EOS PCT % 4   < > 0    < > = values in this interval not displayed       Results from last 7 days   Lab Units 21  0512   SODIUM mmol/L 142   POTASSIUM mmol/L 3 6   CHLORIDE mmol/L 99*   CO2 mmol/L 36*   BUN mg/dL 55*   CREATININE mg/dL 1 69*   ANION GAP mmol/L 7   CALCIUM mg/dL 8 4   ALBUMIN g/dL 2 7*   TOTAL BILIRUBIN mg/dL 0 82   ALK PHOS U/L 190*   ALT U/L 17   AST U/L 18   GLUCOSE RANDOM mg/dL 119     Results from last 7 days   Lab Units 09/21/21  0512   INR  1 61*     Results from last 7 days   Lab Units 09/21/21  1549 09/21/21  1056 09/21/21  0708 09/20/21  2051 09/20/21  1521 09/20/21  1057 09/20/21  0641 09/19/21  1601 09/19/21  1116 09/19/21  0756 09/18/21  1646 09/18/21  1052   POC GLUCOSE mg/dl 135 168* 124 146* 143* 152* 157* 123 149* 126 140 134         Results from last 7 days   Lab Units 09/21/21  0512 09/20/21  0457 09/19/21  0602 09/18/21  0553 09/16/21  0452   PROCALCITONIN ng/ml 0 67* 0 95* 1 51* 2 59* 7 42*       Lines/Drains:  Invasive Devices     Peripheral Intravenous Line            Peripheral IV 09/18/21 Right Forearm 3 days    Peripheral IV 09/18/21 Right;Upper Arm 3 days                      Imaging: No pertinent imaging reviewed      Recent Cultures (last 7 days):         Last 24 Hours Medication List:   Current Facility-Administered Medications   Medication Dose Route Frequency Provider Last Rate    acetaZOLAMIDE  250 mg Oral Q12H [facility] TRES Ball      cefepime  1,000 mg Intravenous Q24H Deuce Chaney MD Stopped (09/21/21 0920)    furosemide  20 mg/hr Intravenous Continuous Freddie Lawrence DO 20 mg/hr (09/21/21 1115)    HYDROmorphone  0 5 mg Intravenous Q6H PRN Deuce Chaney MD      insulin lispro  1-6 Units Subcutaneous TID [facility], DO      insulin lispro  1-6 Units Subcutaneous HS Freddie Lawrence DO      metoprolol tartrate  50 mg Oral Q12H [facility] Deuce Chaney MD      midodrine  5 mg Oral TID AC TRES Ball      nicotine  1 patch Transdermal Daily Deuce Chaney MD      ondansetron  4 mg Intravenous Q6H PRN Deuce Chaney MD      pantoprazole  40 mg Oral Early Morning Deuce Chaney MD      polyethylene glycol  17 g Oral Daily PRN Deuce Chaney MD      vancomycin  1,250 mg Intravenous Q48H Deuce Chaney MD 1,250 mg (09/20/21 1001)    warfarin  7 mg Oral Daily (warfarin) Mary Anne Jacques DO          Today, Patient Was Seen By: Stas Rogers, DO    **Please Note: This note may have been constructed using a voice recognition system  **

## 2021-09-21 NOTE — ASSESSMENT & PLAN NOTE
Lab Results   Component Value Date    HGBA1C 5 5 05/21/2021       Recent Labs     09/20/21 2051 09/21/21  0708 09/21/21  1056 09/21/21  1549   POCGLU 146* 124 168* 135       Blood Sugar Average: Last 72 hrs:  (P) 141 [id number]     · Not currently on any diabetic medications  · Insulin sliding scale with blood glucose monitoring ACHS   Outpatient follow-up with PCP in regards to this matter

## 2021-09-21 NOTE — TELEPHONE ENCOUNTER
----- Message from Joe Baldwin sent at 9/20/2021  2:33 PM EDT -----  Regarding: Care Gaps Request  09/20/21 2:33 PM    Hello, our patient Chica Avendano has had Diabetic Foot Exam completed/performed  Please assist in updating the patient chart by making an External outreach to Dr Jacklyn Fajardo facility located in [facility]  The date of service is most recent 2021? Audi Koroma     Thank you,  Joe Baldwin  [facility]

## 2021-09-21 NOTE — ASSESSMENT & PLAN NOTE
Known past medical history in the setting of CKD and cirrhosis, as well as colonic AVM's requiring APC in the past, and on chronic AC with warfarin  Baseline Hgb of 7-8  · Worsening anemia in the setting of acute illness  · FOBT negative X 2    · Has required transfusion with PRBCs, currently s/p 3 Units  · Continue to trend H/H - current stable  · Iron studies with low TIBC and elevated ferritin, consistent with anemia of chronic disease      Results from last 7 days   Lab Units 09/21/21  0512 09/20/21  0457 09/19/21  0602   WBC Thousand/uL 3 33* 4 81 5 74   HEMOGLOBIN g/dL 8 0* 8 4* 8 4*   HEMATOCRIT % 26 7* 27 6* 28 3*   PLATELETS Thousands/uL 195 190 184

## 2021-09-21 NOTE — TELEPHONE ENCOUNTER
Upon review of the In Basket request and the patient's chart, initial outreach has been made via fax, please see Contacts section for details       Thank you  Alise Comp

## 2021-09-21 NOTE — ASSESSMENT & PLAN NOTE
History of AFib, presented with RVR in the setting of sepsis  Chronically on metoprolol succinate twice daily and AC with warfarin  · Required Cardizem gtt for rate control initially - weaned off  · Reintroduced metoprolol with tartrate formulation previously - increased dose again 9/17  · Warfarin was held in the setting of worsening anemia and previously supratherapeutic INR  · Resumed warfarin 9/18  · Increase coumadin to 7 mg PO Qdaily on 09/21/2021  · Continue to monitor heart rate with cardiopulmonary monitoring  · Heart rate is overall improved but remains mildly suboptimal - hold off on further titration of beta-blocker given mild hypotension in the setting of aggressive diuresis

## 2021-09-21 NOTE — ASSESSMENT & PLAN NOTE
Lab Results   Component Value Date    EGFR 43 09/21/2021    EGFR 37 09/20/2021    EGFR 33 09/19/2021    CREATININE 1 69 (H) 09/21/2021    CREATININE 1 88 (H) 09/20/2021    CREATININE 2 11 (H) 09/19/2021   · Present on admission with a creatinine of 2 06, with baseline of 1 2 -1 4 - creatinine had been improving, worsened previously to a value of 2 86  Initial presentation with sepsis and septic shock - presumed prerenal etiology with creatinine improving with IV fluids  Patient with concurrent worsening anemia and hypoalbuminemia as well  Patient also has a history of right-sided and potentially diastolic CHF, and appears to be volume overloaded, with evidence of vascular congestion and IVC engorgement by CT, weight gain, and net positive fluid balance  Patient remains overall volume overloaded but improved - creatinine improved to 2 11 this morning with aggressive diuresis  · Remains off IV fluids  · Attempts at bladder scan unsuccessful due to pain - patient with overlying panniculitis  · Renal ultrasound without evidence of hydronephrosis  · Increased the lasix drip/infusion to 20 mg/hr on 09/21/2021  · Dry weight approximately 300 lbs  · Continue to avoid nephrotoxins, and renally dose medications when appropriate  · Nephrology officially consulted and following

## 2021-09-21 NOTE — ASSESSMENT & PLAN NOTE
History of a chronic, partially loculated right pleural effusion with associated atelectasis at the right lung base  He was admitted from 05/26 through 06/09/2021 for this, and transferred to [facility] - treated with chest tube with infusion of tPA  Repeat scan on 05/30 revealed a loculated right hydropneumothorax, after which he underwent a right-sided thoracoscopic decortication 06/02/2021  He was scheduled for thoracentesis via IR on 09/14 - given clinical picture decided to hold off on thoracentesis  CT of the chest shows a large chronic right pleural effusion with probable partial loculations  Will continue to hold off repeat tap and monitor

## 2021-09-21 NOTE — LETTER
Diabetic Foot Exam Form    Date Requested: 21  Patient: Brina Mri  Patient : 1960   Referring Provider: Van Martel DO    Diabetic Foot Exam Performed with shoes and socks removed        Yes         No     Date of Diabetic Foot Exam ______________________________  Risk Score ____________________________________________    Left Foot       Visual Inspection         Monofilament Testing Sensory Exam        Pedal Pulses         Additional Comments         Right Foot      Visual Inspection         Monofilament Testing Sensory Exam       Pedal Pulses         Additional Comments         Comments __________________________________________________________    Practice Providing Exam ______________________________________________    Exam Performed By (print name) _______________________________________      Provider Signature ___________________________________________________      These reports are needed for  compliance  Please fax this completed form and a copy of the Diabetic Foot Exam report to our office located at [place] [address] as soon as possible to [phone] luz Dunlap Prophet: Phone [phone]    We thank you for your assistance in treating our mutual patient

## 2021-09-21 NOTE — ASSESSMENT & PLAN NOTE
Last ECHO 02/2021 with preserved LVEF, mildly increased LV wall thickness, and mildly reduced RV systolic function  · CT 9/13 with vascular congestion, IVC engorgement consistent with cardiac congestion  · Patient demonstrating weight gain and net positive fluid status  · Has received significant IV fluids in the setting of septic shock, which were discontinued 9/15  · Also with hypoalbuminemia which is contributing to worsening overall extremity edema  · Overall appears volume overloaded, but also with concurrent MARISABEL and low FENA  · Home diuretic therapy in the form of 80 mg furosemide BID has been on hold since admission  · Was initiated on IV furosemide 60 mg BID without adequate diuresis  · Initiated on lasix gtt 9/16 by Nephrology with good diuresis  · Continue to monitor daily weights, I/O's  · Increased the lasix drip/infusion to 20 mg/hr on 09/21/2021  · Dry weight approximately 300 lbs

## 2021-09-21 NOTE — ASSESSMENT & PLAN NOTE
Baseline in the 130-140s range, presented with a value of 127 along with concurrent hypochloremia  In the setting of sepsis thought secondary to hypovolemia, but patient appears volume overloaded with sodium unchanged despite IV fluids  Current levels improving with diuresis and normalized today to 140  Low-sodium is very likely due to hypervolemic hyponatremia  Hyponatremia has resolved  Continue to monitor      Results from last 7 days   Lab Units 09/21/21  0512 09/20/21  0457 09/19/21  0602   SODIUM mmol/L 142 142 140   POTASSIUM mmol/L 3 6 3 2* 3 0*   CHLORIDE mmol/L 99* 97* 95*   CO2 mmol/L 36* 36* 33*   BUN mg/dL 55* 60* 65*   CREATININE mg/dL 1 69* 1 88* 2 11*   CALCIUM mg/dL 8 4 8 4 8 5

## 2021-09-21 NOTE — PROGRESS NOTES
Vancomycin IV Pharmacy-to-Dose Consultation    Mikaela Delgadillo is a 57 y o  male who is currently receiving Vancomycin IV with management by the Pharmacy Consult service  Assessment/Plan:  The patient was reviewed  Renal function is stable and no signs or symptoms of nephrotoxicity and/or infusion reactions were documented in the chart  Based on todays assessment, continue current vancomycin (day # 9) dosing of 1250mg q48h when random level is under 20 , with a plan for random level to be drawn at 0600 on 9/22/21  We will continue to follow the patients culture results and clinical progress daily      Mary Myers, Pharmacist

## 2021-09-21 NOTE — PROGRESS NOTES
NEPHROLOGY PROGRESS NOTE   Jacquelin Olivares 57 y o  male MRN: [id number]  Unit/Bed#: 111-62 Encounter: [id number]    Assessment/Plan:    Jacquelin Olivares is a 57 y o  male whose pertinent medical problems include CKD 3, CdHF, a-fib, cirrhosis, obesity, lymphedema admitted 9/13/21 with chief complaint of abdominal pain being treated for septic shock (poa/resolved) secondary to panniculitis  Renal following along for acute kidney injury atop chronic kidney disease, cardiorenal syndrome, electrolyte management      1  Acute kidney injury (POA) atop chronic kidney disease  ? Renal function continues to improve  Continue aggressive diuresis, avoidance of nephrotoxins, maximization of hemodynamics  See below for further details  2  Stage 3 chronic kidney disease with baseline creatinine around 1 2 mg/dL  3  Cardiorenal syndrome type 1, recurrent  ? Continue Lasix infusion at increased rate  Consider switching to intermittent diuretic dosing within the next 24-48 hours  Continue daily weight, strict I&O, sodium restriction  No fluid restriction required at this time  4  Acute on chronic diastolic congestive heart failure - as per 3    5  Hypervolemic hyponatremia - resolved  6  Hypokalemia   ? Will provide patient with an additional 40 mEq oral potassium today for told 80 mEq oral potassium in the setting of aggressive diuresis  7  Septic shock (POA/shock resolved) due to panniculitis  ? Management per primary team  8  Hypotension  ? Decreased midodrine 5 mg t i d  Yesterday, continue to wean as able  9  Cirrhosis of liver  10  Atrial fibrillation with a rapid ventricular response  11  Alkalosis  · Will initiate patient on Diamox 250 mg b i d for 4 doses     Maximize potassium levels as above    ROS  States he feels very cold  A complete 10 point review of systems have been performed and are otherwise negative         Historical Information   Past Medical History:   Diagnosis Date    A-fib [facility])     Acute renal failure superimposed on stage 3b [facility] 03/15/2021    Cardiac disease     Chronic combined systolic (congestive) and diastolic (congestive) heart failure (HCC)     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 ascites (HCC)     Class 3 severe obesity due to excess calories with serious comorbidity and body mass index (BMI) of 45 0 to 49 9 in adult [facility]) 10/17/2018    Diabetes mellitus ([facility] )     Dilated cardiomyopathy ([place] [id number] )     History of echocardiogram 11/24/2014    EF 0 30 (30%), Likely mod LV systolic dysfunction  Likely RV dysfunction as well   History of Lexiscan MPI 02/19/2016    EF 0 43 (43%), no prior MI or ischemia   Hx of echocardiogram 04/28/2017    Normal EF, Normal LV systolic function  Mild concentric LV hypertrophy  Mild mitral and tricuspid regurg   Hx: recurrent pneumonia     Hypertension     Hypokalemia 09/04/2020    Long term (current) use of anticoagulants     Morbid (severe) obesity due to excess calories (HCC)     Neuropathy     Pleural effusion on right     Stage 3 chronic kidney disease [facility])      Past Surgical History:   Procedure Laterality Date    HERNIA REPAIR      IR CHEST TUBE CHECK/CHANGE/REPOSITION/UPSIZE  5/27/2021    IR CHEST TUBE PLACEMENT  5/22/2021    IR THORACENTESIS  8/25/2020    IR THORACENTESIS  2/23/2021    LUNG DECORTICATION Right 6/2/2021    Procedure: DECORTICATION LUNG;  Surgeon: Keri Anthony MD;  [facility];  Service: Thoracic    SPLENECTOMY, TOTAL      THORACOSCOPY VIDEO ASSISTED SURGERY (VATS) Right 6/2/2021    Procedure: THORACOSCOPY VIDEO ASSISTED SURGERY (VATS);   Surgeon: Keri Anthony MD;  [facility];  Service: Thoracic    VASCULAR SURGERY Right     leg     Social History   Social History     Substance and Sexual Activity   Alcohol Use Yes    Comment: 4 or 5 drinks weekly/bloody chani's     Social History     Substance and Sexual Activity   Drug Use Never     Social History     Tobacco Use Smoking Status Current Some Day Smoker    Packs/day: 0 25    Types: Cigarettes   Smokeless Tobacco Never Used   Tobacco Comment    smokes 1 pack per week and a half        Family History:   History reviewed  No pertinent family history  Medications:  Pertinent medications were reviewed  Current Facility-Administered Medications   Medication Dose Route Frequency Provider Last Rate    cefepime  1,000 mg Intravenous Q24H Tobias Crane MD Stopped (09/21/21 0920)    furosemide  20 mg/hr Intravenous Continuous Pat Lawrence DO 20 mg/hr (09/21/21 1115)    HYDROmorphone  0 5 mg Intravenous Q6H PRN Tobias Crane MD      insulin lispro  1-6 Units Subcutaneous TID [facility] Pat Lawrence DO      insulin lispro  1-6 Units Subcutaneous HS Pat Lawrence DO      metoprolol tartrate  50 mg Oral Q12H [address] Tobias Crane MD      midodrine  5 mg Oral TID AC TRES Avalos      nicotine  1 patch Transdermal Daily Tobias Crane MD      ondansetron  4 mg Intravenous Q6H PRN Tobias Crane MD      pantoprazole  40 mg Oral Early Morning Tobias Crane MD      polyethylene glycol  17 g Oral Daily PRN Tobias Crane MD      vancomycin  1,250 mg Intravenous Q48H Tobias Crane MD 1,250 mg (09/20/21 1001)    warfarin  7 mg Oral Daily (warfarin) Pat Lawrence DO           No Known Allergies      Vitals:   /69 (BP Location: Left arm)   Pulse 102   Temp 98 6 °F (37 °C) (Oral)   Resp 18   Ht 6' (1 829 m)   Wt (!) 144 kg (318 lb 2 oz)   SpO2 98%   BMI 43 15 kg/m²   Body mass index is 43 15 kg/m²    SpO2: 98 %,   SpO2 Activity: At Rest,   O2 Device: None (Room air)      Intake/Output Summary (Last 24 hours) at 9/21/2021 1538  Last data filed at 9/21/2021 1229  Gross per 24 hour   Intake 1600 ml   Output 2650 ml   Net -1050 ml     Invasive Devices     Peripheral Intravenous Line            Peripheral IV 09/18/21 Right Forearm 3 days    Peripheral IV 09/18/21 Right;Upper Arm 3 days                Physical Exam  General: conscious, cooperative, in no acute distress  Eyes: conjunctivae pink, anicteric sclerae  ENT: lips and mucous membranes moist  Neck: supple, no JVD, no masses  Chest:  Diminished breath sounds bilaterally, no crackles, ronchus or wheezings  CVS: S1 & S2, normal rate, regular rhythm  Abdomen: soft, non-tender, non-distended, normoactive bowel sounds obese  Extremities:  Moderate to severe edema of both legs  Skin: no rash  Neuro: awake, alert, oriented      Diagnostic Data:  Lab: I have personally reviewed pertinent lab results  ,   CBC:  Results from last 7 days   Lab Units 09/21/21  0512   WBC Thousand/uL 3 33*   HEMOGLOBIN g/dL 8 0*   HEMATOCRIT % 26 7*   PLATELETS Thousands/uL 195      CMP:   Lab Results   Component Value Date    SODIUM 142 09/21/2021    K 3 6 09/21/2021    CL 99 (L) 09/21/2021    CO2 36 (H) 09/21/2021    BUN 55 (H) 09/21/2021    CREATININE 1 69 (H) 09/21/2021    CALCIUM 8 4 09/21/2021    AST 18 09/21/2021    ALT 17 09/21/2021    ALKPHOS 190 (H) 09/21/2021    EGFR 43 09/21/2021   ,   PT/INR:   Lab Results   Component Value Date    INR 1 61 (H) 09/21/2021   ,   Magnesium: No components found for: MAG,  Phosphorous:   Lab Results   Component Value Date    PHOS 3 8 09/21/2021       Microbiology:  @[email or web address],(urinecx:7)@        TRES Sotelo    Portions of the record may have been created with voice recognition software  Occasional wrong word or "sound a like" substitutions may have occurred due to the inherent limitations of voice recognition software  Read the chart carefully and recognize, using context, where substitutions have occurred

## 2021-09-22 PROBLEM — D61.818 PANCYTOPENIA (HCC): Status: ACTIVE | Noted: 2021-01-01

## 2021-09-22 NOTE — ASSESSMENT & PLAN NOTE
Lab Results   Component Value Date    EGFR 42 09/22/2021    EGFR 43 09/21/2021    EGFR 37 09/20/2021    CREATININE 1 70 (H) 09/22/2021    CREATININE 1 69 (H) 09/21/2021    CREATININE 1 88 (H) 09/20/2021   · Present on admission with a creatinine of 2 06, with baseline of 1 2 -1 4 - creatinine had been improving, worsened previously to a value of 2 86  Initial presentation with sepsis and septic shock - presumed prerenal etiology with creatinine improving with IV fluids  Patient with concurrent worsening anemia and hypoalbuminemia as well  Patient also has a history of right-sided and potentially diastolic CHF, and appears to be volume overloaded, with evidence of vascular congestion and IVC engorgement by CT, weight gain, and net positive fluid balance  Patient remains overall volume overloaded but improved - creatinine improved to 2 11 this morning with aggressive diuresis  · Remains off IV fluids  · Attempts at bladder scan unsuccessful due to pain - patient with overlying panniculitis  · Renal ultrasound without evidence of hydronephrosis  · Increased the lasix drip/infusion to 20 mg/hr on 09/21/2021  · Transition to lasix 80 mg IV BID  · Dry weight approximately 300 lbs  · Continue to avoid nephrotoxins, and renally dose medications when appropriate  · Nephrology officially consulted and following

## 2021-09-22 NOTE — ASSESSMENT & PLAN NOTE
Last ECHO 02/2021 with preserved LVEF, mildly increased LV wall thickness, and mildly reduced RV systolic function  · CT 9/13 with vascular congestion, IVC engorgement consistent with cardiac congestion  · Patient demonstrating weight gain and net positive fluid status  · Has received significant IV fluids in the setting of septic shock, which were discontinued 9/15  · Also with hypoalbuminemia which is contributing to worsening overall extremity edema  · Overall appears volume overloaded, but also with concurrent MARISABEL and low FENA  · Home diuretic therapy in the form of 80 mg furosemide BID has been on hold since admission  · Was initiated on IV furosemide 60 mg BID without adequate diuresis  · Initiated on lasix gtt 9/16 by Nephrology with good diuresis  · Continue to monitor daily weights, I/O's  · Increased the lasix drip/infusion to 20 mg/hr on 09/21/2021  · Transition to lasix 80 mg IV BID  · Dry weight approximately 300 lbs

## 2021-09-22 NOTE — ASSESSMENT & PLAN NOTE
· Likely due to chronic liver disease  · Follow the CBC    Results from last 7 days   Lab Units 09/22/21  0424 09/21/21  0512 09/20/21  0457   WBC Thousand/uL 2 69* 3 33* 4 81   HEMOGLOBIN g/dL 8 1* 8 0* 8 4*   HEMATOCRIT % 28 1* 26 7* 27 6*   PLATELETS Thousands/uL 129* 195 190

## 2021-09-22 NOTE — TELEPHONE ENCOUNTER
Upon review of the In Basket request we were able to locate, review, and update the patient chart as requested for Diabetic Foot Exam     Any additional questions or concerns should be emailed to the Practice Liaisons via [email or web address] email, please do not reply via In Basket      Thank you  Greg Linder

## 2021-09-22 NOTE — PHYSICAL THERAPY NOTE
PHYSICAL THERAPY NOTE          Patient Name: Chica Avendano  FZSCR'F Date: 9/22/2021 09/22/21 6281   Note Type   Note Type Treatment   Pain Assessment   Pain Assessment Tool Pain Assessment not indicated - pt denies pain   Pain Score No Pain   Restrictions/Precautions   Other Precautions Fall Risk;Multiple lines   Cognition   Overall Cognitive Status WFL   Subjective   Subjective Reports he is feeling better   Bed Mobility   Additional Comments OOB in chair at start and end of PT session  Needs assist to don/doff shoes   Transfers   Sit to Stand 5  Supervision   Additional items Assist x 1; Armrests; Increased time required   Stand to Sit 5  Supervision   Additional items Assist x 1; Armrests; Increased time required   Stand pivot 5  Supervision   Ambulation/Elevation   Gait pattern   (Short stride; Foward flexed;Decreased foot clearance; Wide NICOLLE)   Gait Assistance 5  Supervision   Additional items Assist x 1;Verbal cues   Assistive Device Straight cane   Distance 140'   Balance   Static Sitting Good   Dynamic Sitting Good   Static Standing Fair +   Dynamic Standing Fair +   Ambulatory Fair   Endurance Deficit   Endurance Deficit Yes   Activity Tolerance   Activity Tolerance Patient limited by fatigue   Exercises   Hip Flexion Sitting;15 reps   Hip Abduction Sitting;20 reps   Hip Adduction Sitting;20 reps   Knee AROM Long Arc Quad Sitting;20 reps   Ankle Pumps Sitting;20 reps   Assessment   Prognosis Good   Problem List Decreased strength;Decreased endurance; Impaired balance;Decreased mobility;Obesity   Assessment Pt  seen for PT treatment session this date with interventions consisting of  therapeutic exercises, transfers and  gait training w/ emphasis on improving pt's ability to ambulate  Pt  Currently performing  tx and ambulation at ( SUP) x 1 level of function   The patient's AM-PAC Basic Mobility Inpatient Short Form Raw Score is 22, Standardized Score is 47 4  A standardized score greater than 42 9 suggests the patient may benefit from discharge to home  Please also refer to physical therapy recommendation for safe DC planning  In comparison to previous session, Pt  With improvements in activity tolerance  Demonstrates improving endurance as noted by increased distance with less SOB  Pt is in need of continued activity in PT to improve strength balance endurance mobility transfers and ambulation with return to maximize LOF  From PT/mobility standpoint, recommendation at time of d/c would be OP PT  in order to promote return to PLOF and independence  Goals   LTG Expiration Date 09/29/21   Plan   Treatment/Interventions Functional transfer training;LE strengthening/ROM; Elevations; Therapeutic exercise; Endurance training;Bed mobility;Gait training   Progress Progressing toward goals   Recommendation   PT Discharge Recommendation Home with outpatient rehabilitation   AM-PAC Basic Mobility Inpatient   Turning in Bed Without Bedrails 4   Lying on Back to Sitting on Edge of Flat Bed 3   Moving Bed to Chair 4   Standing Up From Chair 4   Walk in Room 4   Climb 3-5 Stairs 3   Basic Mobility Inpatient Raw Score 22   Basic Mobility Standardized Score 47 4   Pt  OOB in chair  with call bell within reach, all lines intact  at end of PT session  Discussed with  PT today's treatment and patient's current level of function for care coordination

## 2021-09-22 NOTE — ASSESSMENT & PLAN NOTE
Lab Results   Component Value Date    HGBA1C 5 5 05/21/2021       Recent Labs     09/21/21  2112 09/22/21  0700 09/22/21  1114 09/22/21  1619   POCGLU 164* 125 110 114       Blood Sugar Average: Last 72 hrs:  (P) 138 [id number]     · Not currently on any diabetic medications  · Insulin sliding scale with blood glucose monitoring ACHS   Outpatient follow-up with PCP in regards to this matter

## 2021-09-22 NOTE — ASSESSMENT & PLAN NOTE
Baseline in the 130-140s range, presented with a value of 127 along with concurrent hypochloremia  In the setting of sepsis thought secondary to hypovolemia, but patient appears volume overloaded with sodium unchanged despite IV fluids  Current levels improving with diuresis and normalized today to 140  Low-sodium is very likely due to hypervolemic hyponatremia  Hyponatremia has resolved  Continue to monitor      Results from last 7 days   Lab Units 09/22/21  0424 09/21/21  0512 09/20/21  0457   SODIUM mmol/L 139 142 142   POTASSIUM mmol/L 3 8 3 6 3 2*   CHLORIDE mmol/L 98* 99* 97*   CO2 mmol/L 35* 36* 36*   BUN mg/dL 57* 55* 60*   CREATININE mg/dL 1 70* 1 69* 1 88*   CALCIUM mg/dL 8 8 8 4 8 4

## 2021-09-22 NOTE — PROGRESS NOTES
NEPHROLOGY PROGRESS NOTE   Shahla Lainez 57 y o  male MRN: [id number]  Unit/Bed#: 095-24 Encounter: [id number]    Assessment/Plan:    Shahla Lainez is a 57 y o  male whose pertinent medical problems include CKD 3, CdHF, a-fib, cirrhosis, obesity, lymphedema admitted 9/13/21 with chief complaint of abdominal pain being treated for septic shock (poa/resolved) secondary to panniculitis  Renal following along for acute kidney injury atop chronic kidney disease, cardiorenal syndrome, electrolyte management      1  Acute kidney injury (POA) atop chronic kidney disease  ? Renal function has plateaued with a creatinine of 1 7 mg/dL  Suspect his dry weight will probably fall some around 310 lb  Will transition patient off of Lasix infusion and on to Lasix boluses with plan to transition to oral therapy later this week  ? Continue to avoid potential nephrotoxins, maximize hemodynamics with mean arterial pressure goal greater than 60 mmHg  2  Stage 3 chronic kidney disease with baseline creatinine around 1 2 mg/dL  3  Cardiorenal syndrome type 1, recurrent  ? As above, will transition patient off of Lasix infusion and not to Lasix boluses  Rest of edema may require slower diuresis to avoid precipitous rise in creatinine  Continue daily weights, strict I&O, sodium restriction  4  Acute on chronic diastolic congestive heart failure - as per #3  5  Hypervolemic hyponatremia -  resolved  6  Hypokalemia   ? Will provide patient with a total of 40 mEq of oral potassium today  7  Septic shock (POA/shock resolved) due to panniculitis  ? Remains on renally dosed cefepime 1 g  Management per primary team  8  Hypotension  ? Systolic hypotension noted today however mean arterial pressure appropriate  Continue midodrine 5 mg t i d  And wean as able  9  Metabolic alkalosis  · Continue Diamox 250 mg p o  B i d  x4 doses and allow to fall off as scheduled  10   Atrial fibrillation with a rapid ventricular response- per Cardiology and primary team      ROS  States he feels okay but he feels cold  A complete 10 point review of systems have been performed and are otherwise negative  Historical Information   Past Medical History:   Diagnosis Date    A[facility]fib [facility])     [facility] stage [facility] 03[facility]15/2021    Cardiac disease     Chronic combined systolic (congestive) and diastolic (congestive) heart failure (HCC)     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 ascites (HCC)     Class 3 severe obesity due to excess calories with serious comorbidity and body mass index (BMI) of 45 0 to 49 9 in adult [facility]) 10/17/2018    Diabetes mellitus ([facility] )     Dilated cardiomyopathy ([place] [address] )     History of echocardiogram 11/24/2014    EF 0 30 (30%), Likely mod LV systolic dysfunction  Likely RV dysfunction as well   History of Lexiscan MPI 02/19/2016    EF 0 43 (43%), no prior MI or ischemia   Hx of echocardiogram 04/28/2017    Normal EF, Normal LV systolic function  Mild concentric LV hypertrophy  Mild mitral and tricuspid regurg   Hx: recurrent pneumonia     Hypertension     Hypokalemia 09/04/2020    Long term (current) use of anticoagulants     Morbid (severe) obesity due to excess calories (HCC)     Neuropathy     Pleural effusion on right     Stage 3 chronic kidney disease [facility])      Past Surgical History:   Procedure Laterality Date    HERNIA REPAIR      IR CHEST TUBE CHECK/CHANGE/REPOSITION/UPSIZE  5/27/2021    IR CHEST TUBE PLACEMENT  5/22/2021    IR THORACENTESIS  8/25/2020    IR THORACENTESIS  2/23/2021    LUNG DECORTICATION Right 6/2/2021    Procedure: DECORTICATION LUNG;  Surgeon: Romelia Alcantar MD;  [facility];  Service: Thoracic    SPLENECTOMY, TOTAL      THORACOSCOPY VIDEO ASSISTED SURGERY (VATS) Right 6/2/2021    Procedure: THORACOSCOPY VIDEO ASSISTED SURGERY (VATS);   Surgeon: Romelia Alcantar MD;  [facility];  Service: Thoracic    VASCULAR SURGERY Right     leg     Social History   Social History     Substance and Sexual Activity   Alcohol Use Yes    Comment: 4 or 5 drinks weekly/bloody chani's     Social History     Substance and Sexual Activity   Drug Use Never     Social History     Tobacco Use   Smoking Status Current Some Day Smoker    Packs/day: 0 25    Types: Cigarettes   Smokeless Tobacco Never Used   Tobacco Comment    smokes 1 pack per week and a half        Family History:   History reviewed  No pertinent family history  Medications:  Pertinent medications were reviewed  Current Facility-Administered Medications   Medication Dose Route Frequency Provider Last Rate    acetaZOLAMIDE  250 mg Oral Q12H [facility] TRES Blount      cefepime  1,000 mg Intravenous Q24H Raina Ferrara MD 1,000 mg (09/22/21 1560)    furosemide  20 mg/hr Intravenous Continuous Aiden Lawrence DO Stopped (09/22/21 1324)    HYDROmorphone  0 5 mg Intravenous Q6H PRN Raina Ferrara MD      insulin lispro  1-6 Units Subcutaneous TID AC Aiden Lawrence DO      insulin lispro  1-6 Units Subcutaneous HS Aiden Lawrence DO      metoprolol tartrate  50 mg Oral Q12H [facility] Raina Ferrara MD      midodrine  5 mg Oral TID  TRES [place]      nicotine  1 patch Transdermal Daily Rania Ferrara MD      ondansetron  4 mg Intravenous Q6H PRN Raina Ferrara MD      pantoprazole  40 mg Oral Early Morning Raina Ferrara MD      polyethylene glycol  17 g Oral Daily PRN Raina Ferrara MD      vancomycin  1,250 mg Intravenous Q48H Raina Ferrara MD 1,250 mg (09/22/21 1123)    warfarin  7 mg Oral Daily (warfarin) Aiden Lawrence DO           No Known Allergies      Vitals:   BP 96/68   Pulse 100   Temp (!) 97 4 °F (36 3 °C) (Oral)   Resp 18   Ht 6' (1 829 m)   Wt (!) 144 kg (317 lb 3 9 oz)   SpO2 99%   BMI 43 03 kg/m²   Body mass index is 43 03 kg/m²    SpO2: 99 %,   SpO2 Activity: At Rest,   O2 Device: None (Room air)      Intake/Output Summary (Last 24 hours) at 9/22/2021 1325  Last data filed at 9/22/2021 1255  Gross per 24 hour   Intake 1240 ml   Output 2500 ml   Net -1260 ml     Invasive Devices     Peripheral Intravenous Line            Peripheral IV 09/18/21 Right Forearm 4 days    Peripheral IV 09/18/21 Right;Upper Arm 4 days                Physical Exam  General: conscious, cooperative, in no acute distress  Eyes: conjunctivae pink, anicteric sclerae  ENT: lips and mucous membranes moist  Neck: supple, no JVD, no masses  Chest:  Diminished breath sounds bilaterally, no crackles, ronchus or wheezings  CVS: S1 & S2, normal rate, regular rhythm  Abdomen: soft, non-tender, non-distended, normoactive bowel sounds obese  Extremities:  Severe edema of both legs  Skin: no rash  Neuro: awake, alert, oriented      Diagnostic Data:  Lab: I have personally reviewed pertinent lab results  ,   CBC:  Results from last 7 days   Lab Units 09/22/21  0424   WBC Thousand/uL 2 69*   HEMOGLOBIN g/dL 8 1*   HEMATOCRIT % 28 1*   PLATELETS Thousands/uL 129*      CMP:   Lab Results   Component Value Date    SODIUM 139 09/22/2021    K 3 8 09/22/2021    CL 98 (L) 09/22/2021    CO2 35 (H) 09/22/2021    BUN 57 (H) 09/22/2021    CREATININE 1 70 (H) 09/22/2021    CALCIUM 8 8 09/22/2021    AST 24 09/22/2021    ALT 18 09/22/2021    ALKPHOS 191 (H) 09/22/2021    EGFR 42 09/22/2021   ,   PT/INR:   Lab Results   Component Value Date    INR 1 69 (H) 09/22/2021   ,   Magnesium: No components found for: MAG,  Phosphorous:   Lab Results   Component Value Date    PHOS 4 2 (H) 09/22/2021       Microbiology:  @[email or web address],(urinecx:7)@        TRES Hardy    Portions of the record may have been created with voice recognition software  Occasional wrong word or "sound a like" substitutions may have occurred due to the inherent limitations of voice recognition software  Read the chart carefully and recognize, using context, where substitutions have occurred

## 2021-09-22 NOTE — PROGRESS NOTES
Vancomycin IV Pharmacy-to-Dose Consultation    Sree Sanchez is a 57 y o  male who is currently receiving Vancomycin IV with management by the Pharmacy Consult service  Assessment/Plan:  The patient was reviewed  Renal function has plateaued around 1 7 SCr  Based on todays assessment, continue current vancomycin (day # 10) dosing of 1250mg Q48H, with a plan for trough to be drawn the morning of 09/24/2021    We will continue to follow the patients culture results and clinical progress daily      Beka Restrepo, Pharmacist

## 2021-09-23 PROBLEM — D70.9 NEUTROPENIA (HCC): Status: ACTIVE | Noted: 2021-01-01

## 2021-09-23 PROBLEM — R74.8 ELEVATED ALKALINE PHOSPHATASE LEVEL: Status: ACTIVE | Noted: 2021-01-01

## 2021-09-23 PROBLEM — E83.39 HYPERPHOSPHATEMIA: Status: ACTIVE | Noted: 2021-01-01

## 2021-09-23 NOTE — PROGRESS NOTES
[address] [facility] [address] [place]  Progress Note - Nereida Gomez 1960, 57 y o  male MRN: [id number]  Unit/Bed#: 152-09 Encounter: [id number]  Primary Care Provider: Yuly Rodriguez DO   Date and time admitted to hospital: 9/13/2021  9:51 AM    * Severe sepsis with septic shock [facility])  Assessment & Plan  Presented with lower abdominal pain onset on the night prior to admission  Sepsis criteria present with tachycardia, tachypnea, hypotension, leukocytosis, and fever, with associated organ dysfunction with evidence of MARISABEL  Source presumed to be skin and soft tissue in patient with evidence of panniculitis  Sepsis has resolved  · CT with dermal thickening and subcutaneous edema in the ventral lower pannus without collection, soft tissue gas, or inflammatory changes extending into the perineum  · Completed a 10-day course of broad-spectrum antibiotic treatment on 09/22/2021 with IV vancomycin and IV cefepime  · Initially on IV fluids - discontinued  · Levophed successfully weaned off  · Right-sided IJ TLC discontinued   Blood cultures with no growth X 5 days   Procalcitonin elevated 3 89 --> 7 87, 8 77, 7 42, 2 59, 1 51, 0 67   Was initiated on midodrine, octreotide, and additional albumin by Critical Care Medicine previously  All discontinued  [address] and Nephrology consulted and following  Panniculitis  Assessment & Plan  · Please see the plan for "Severe sepsis with septic shock"    Pleural effusion, right  Assessment & Plan  History of a chronic, partially loculated right pleural effusion with associated atelectasis at the right lung base  He was admitted from 05/26 through 06/09/2021 for this, and transferred to [facility] - treated with chest tube with infusion of tPA  Repeat scan on 05/30 revealed a loculated right hydropneumothorax, after which he underwent a right-sided thoracoscopic decortication 06/02/2021    He was scheduled for thoracentesis via IR on 09/14 - given clinical picture decided to hold off on thoracentesis  CT of the chest shows a large chronic right pleural effusion with probable partial loculations  Will continue to hold off repeat tap and monitor  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 ascites ([facility] )  Assessment & Plan  Known past medical history secondary to alcoholic and fatty liver disease  · Follows outpatient with Gastroenterology; last seen on 08/04/2021  · Not a candidate for liver transplant given BMI and comorbidities  · Avoid hepatotoxic medications  · Serial lab monitoring    Neutropenia (HCC)  Assessment & Plan  · Likely due to chronic liver disease  · Follow the absolute neutrophil count  · Follow the CBC  · Outpatient Hematology/Oncology evaluation    Elevated alkaline phosphatase level  Assessment & Plan  · Follow the total alkaline phosphatase level    Hyperphosphatemia  Assessment & Plan  · Management per Nephrology  · Follow the phosphorus level    Pancytopenia ([facility] )  Assessment & Plan  · Likely due to chronic liver disease  · Follow the CBC  · Outpatient Hematology/Oncology evaluation    Results from last 7 days   Lab Units 09/23/21  0633 09/22/21  0424 09/21/21  0512   WBC Thousand/uL 2 00* 2 69* 3 33*   HEMOGLOBIN g/dL 8 1* 8 1* 8 0*   HEMATOCRIT % 28 1* 28 1* 26 7*   PLATELETS Thousands/uL 170 129* 195         NSVT (nonsustained ventricular tachycardia) ([facility])  Assessment & Plan  Brief run of non-sustained V-tach on cardiopulmonary monitoring on 09/15 late in the afternoon  Patient's EF approximately 6 months ago was preserved  Has remained in AFib and with sepsis, original septic shock  Was also maintained on Levophed and Cardizem gtt  Patient was asymptomatic and episode was very brief  Continue to monitor renal function and electrolytes carefully, and aim for potassium level greater than 4 and magnesium level greater than 2      Continue PO metoprolol    Outpatient follow-up with Cardiology        Hypoalbuminemia  Assessment & Plan  In the setting of cirrhosis  Given patient's septic shock, hypotension, and concurrent low albumin - has received albumin infusions  Chronic right-sided congestive heart failure [facility])  Assessment & Plan  Last ECHO 02/2021 with preserved LVEF, mildly increased LV wall thickness, and mildly reduced RV systolic function  · CT 9/13 with vascular congestion, IVC engorgement consistent with cardiac congestion  · Patient demonstrating weight gain and net positive fluid status  · Has received significant IV fluids in the setting of septic shock, which were discontinued 9/15  · Also with hypoalbuminemia which is contributing to worsening overall extremity edema  · Overall appears volume overloaded, but also with concurrent MARISABEL and low FENA  · Home diuretic therapy in the form of 80 mg furosemide BID has been on hold since admission  · Was initiated on IV furosemide 60 mg BID without adequate diuresis  · Initiated on lasix gtt 9/16 by Nephrology with good diuresis  · Continue to monitor daily weights, I/O's  · Increased the lasix drip/infusion to 20 mg/hr on 09/21/2021  · Transitioned to lasix 80 mg IV BID on 09/22/2021  · Change to lasix 80 mg PO BID on 09/23/2021    Acute kidney injury superimposed on chronic kidney disease [facility])  Assessment & Plan  Lab Results   Component Value Date    EGFR 42 09/23/2021    EGFR 42 09/22/2021    EGFR 43 09/21/2021    CREATININE 1 70 (H) 09/23/2021    CREATININE 1 70 (H) 09/22/2021    CREATININE 1 69 (H) 09/21/2021   · Present on admission with a creatinine of 2 06, with baseline of 1 2 -1 4 - creatinine had been improving, worsened previously to a value of 2 86  Initial presentation with sepsis and septic shock - presumed prerenal etiology with creatinine improving with IV fluids  Patient with concurrent worsening anemia and hypoalbuminemia as well      Patient also has a history of right-sided and potentially diastolic CHF, and appears to be volume overloaded, with evidence of vascular congestion and IVC engorgement by CT, weight gain, and net positive fluid balance  Patient remains overall volume overloaded but improved - creatinine improved to 2 11 this morning with aggressive diuresis  · Remains off IV fluids  · Attempts at bladder scan unsuccessful due to pain - patient with overlying panniculitis  · Renal ultrasound without evidence of hydronephrosis  · Increased the lasix drip/infusion to 20 mg/hr on 09/21/2021  · Transitioned to lasix 80 mg IV BID on 09/22/2021  · Change to lasix 80 mg PO BID on 09/23/2021  · Continue to avoid nephrotoxins, and renally dose medications when appropriate  · Nephrology officially consulted and following  Hyponatremia  Assessment & Plan  Baseline in the 130-140s range, presented with a value of 127 along with concurrent hypochloremia  In the setting of sepsis thought secondary to hypovolemia, but patient appears volume overloaded with sodium unchanged despite IV fluids  Current levels improving with diuresis and normalized today to 140  Low-sodium is very likely due to hypervolemic hyponatremia  Hyponatremia has resolved  Continue to monitor      Results from last 7 days   Lab Units 09/23/21  0633 09/22/21  0424 09/21/21  0512   SODIUM mmol/L 141 139 142   POTASSIUM mmol/L 3 9 3 8 3 6   CHLORIDE mmol/L 101 98* 99*   CO2 mmol/L 34* 35* 36*   BUN mg/dL 57* 57* 55*   CREATININE mg/dL 1 70* 1 70* 1 69*   CALCIUM mg/dL 8 9 8 8 8 4         Type 2 diabetes mellitus with hyperglycemia, without long-term current use of insulin [facility])  Assessment & Plan  Lab Results   Component Value Date    HGBA1C 5 5 05/21/2021       Recent Labs     09/22/21  1619 09/22/21  2154 09/23/21  0741 09/23/21  1114   POCGLU 114 127 125 106       Blood Sugar Average: Last 72 hrs:  (P) 135 [id number]     · Not currently on any diabetic medications  · Insulin sliding scale [facility]   Outpatient follow-up with PCP in regards to this matter    Permanent atrial fibrillation [facility])  Assessment & Plan  History of AFib, presented with RVR in the setting of sepsis  Chronically on metoprolol succinate twice daily and AC with warfarin  · Required Cardizem gtt for rate control initially - weaned off  · Reintroduced metoprolol with tartrate formulation previously - increased dose again 9/17  · Warfarin was held in the setting of worsening anemia and previously supratherapeutic INR  · Resumed warfarin 9/18  · Increased coumadin to 10 mg PO Qdaily on 09/23/2021  · Continue to monitor heart rate with cardiopulmonary monitoring  · Heart rate is overall improved but remains mildly suboptimal - hold off on further titration of beta-blocker given mild hypotension in the setting of aggressive diuresis  Hypokalemia  Assessment & Plan  · Repleted per Nephrology  · Follow the potassium level and magnesium level    Results from last 7 days   Lab Units 09/23/21  0633 09/22/21  0424 09/21/21  0512   SODIUM mmol/L 141 139 142   POTASSIUM mmol/L 3 9 3 8 3 6   CHLORIDE mmol/L 101 98* 99*   CO2 mmol/L 34* 35* 36*   BUN mg/dL 57* 57* 55*   CREATININE mg/dL 1 70* 1 70* 1 69*   CALCIUM mg/dL 8 9 8 8 8 4         Chronic anemia  Assessment & Plan  Known past medical history in the setting of CKD and cirrhosis, as well as colonic AVM's requiring APC in the past, and on chronic AC with warfarin  Baseline Hgb of 7-8  · Worsening anemia in the setting of acute illness  · FOBT negative X 2    · Has required transfusion with PRBCs, currently s/p 3 Units  · Continue to trend H/H - current stable  · Iron studies with low TIBC and elevated ferritin, consistent with anemia of chronic disease      Results from last 7 days   Lab Units 09/23/21  0633 09/22/21  0424 09/21/21  0512   WBC Thousand/uL 2 00* 2 69* 3 33*   HEMOGLOBIN g/dL 8 1* 8 1* 8 0*   HEMATOCRIT % 28 1* 28 1* 26 7*   PLATELETS Thousands/uL 170 129* 195         STEFF (obstructive sleep apnea)  Assessment & Plan  · Known past medical history with CPAP compliance at home  Continue CPAP HS per home settings  Chronic venous insufficiency  Assessment & Plan  Chronic venous insufficiency with concurrent hypoalbuminemia, cirrhosis, and right-sided CHF  Patient has also been fluid resuscitated with worsening lower extremity edema  VTE Pharmacologic Prophylaxis: VTE Score: 7 High Risk (Score >/= 5) - Pharmacological DVT Prophylaxis Ordered: warfarin (Coumadin)  Sequential Compression Devices Ordered  Patient Centered Rounds: I performed bedside rounds with nursing staff today  Discussions with Specialists or Other Care Team Provider: I discussed the case with Nephrology  Education and Discussions with Family / Patient: Updated  (wife) at bedside  Time Spent for Care: 30 minutes  More than 50% of total time spent on counseling and coordination of care as described above  Current Length of Stay: 10 day(s)  Current Patient Status: Inpatient   Certification Statement: The patient will continue to require additional inpatient hospital stay due to the need to transition from IV lasix to PO lasix treatment  Discharge Plan: Anticipate discharge tomorrow to home with home services  Code Status: Level 1 - Full Code    Subjective: The patient was seen and examined  The patient is doing better  No chest pain  No shortness of breath  No abdominal pain  No nausea or vomiting  Objective:     Vitals:   Temp (24hrs), Av 3 °F (36 8 °C), Min:97 9 °F (36 6 °C), Max:98 7 °F (37 1 °C)    Temp:  [97 9 °F (36 6 °C)-98 7 °F (37 1 °C)] 97 9 °F (36 6 °C)  HR:  [] 110  Resp:  [16] 16  BP: (100-107)/(68-69) 107/69  SpO2:  [94 %-98 %] 98 %  Body mass index is 43 03 kg/m²  Input and Output Summary (last 24 hours):      Intake/Output Summary (Last 24 hours) at 2021 1432  Last data filed at 2021 0824  Gross per 24 hour   Intake --   Output 1200 ml   Net -1200 ml       Physical Exam:   Physical Exam   General:  NAD, follows commands  HEENT:  NC/AT, mucous membranes moist  Neck:  Supple, No JVP elevation  CV:  + S1, + S2, Tachycardia, Irregularly irregular rhythm  Pulm:  Lung fields are CTA bilaterally  Abd:  Soft, Non-tender, Non-distended  Ext:  No clubbing/cyanosis, Improving edema of the bilateral lower extremities  Skin:  No rashes  Neuro:  Awake, alert, oriented  Psych:  Normal mood and affect      Additional Data:    Labs:  Results from last 7 days   Lab Units 09/23/21  0633 09/17/21  0902 09/17/21  0436   WBC Thousand/uL 2 00*   < > 5 76   HEMOGLOBIN g/dL 8 1*  --  7 4*   HEMATOCRIT % 28 1*  --  24 6*   PLATELETS Thousands/uL 170   < > 209   BANDS PCT %  --   --  2   NEUTROS PCT % 57   < >  --    LYMPHS PCT % 18   < >  --    LYMPHO PCT %  --   --  11*   MONOS PCT % 14*   < >  --    MONO PCT %  --   --  6   EOS PCT % 6   < > 0    < > = values in this interval not displayed  Results from last 7 days   Lab Units 09/23/21  0633   SODIUM mmol/L 141   POTASSIUM mmol/L 3 9   CHLORIDE mmol/L 101   CO2 mmol/L 34*   BUN mg/dL 57*   CREATININE mg/dL 1 70*   ANION GAP mmol/L 6   CALCIUM mg/dL 8 9   ALBUMIN g/dL 2 7*   TOTAL BILIRUBIN mg/dL 0 75   ALK PHOS U/L 177*   ALT U/L 16   AST U/L 23   GLUCOSE RANDOM mg/dL 112     Results from last 7 days   Lab Units 09/23/21  0735   INR  1 69*     Results from last 7 days   Lab Units 09/23/21  1114 09/23/21  0741 09/22/21  2154 09/22/21  1619 09/22/21  1114 09/22/21  0700 09/21/21  2112 09/21/21  1549 09/21/21  1056 09/21/21  0708 09/20/21  2051 09/20/21  1521   POC GLUCOSE mg/dl 106 125 127 114 110 125 164* 135 168* 124 146* 143*         Results from last 7 days   Lab Units 09/23/21  0735 09/21/21  0512 09/20/21  0457 09/19/21  0602 09/18/21  0553   PROCALCITONIN ng/ml 0 53* 0 67* 0 95* 1 51* 2 59*       Lines/Drains:  Invasive Devices     Peripheral Intravenous Line            Peripheral IV 09/22/21 Dorsal (posterior); Right Forearm <1 day                      Imaging: No pertinent imaging reviewed  Recent Cultures (last 7 days):   Results from last 7 days   Lab Units 09/20/21  1554   URINE CULTURE  No Growth <1000 cfu/mL       Last 24 Hours Medication List:   Current Facility-Administered Medications   Medication Dose Route Frequency Provider Last Rate    furosemide  80 mg Oral BID (diuretic) Lena Lawrence DO      HYDROmorphone  0 5 mg Intravenous Q6H PRN Sony Murphy MD      insulin lispro  1-6 Units Subcutaneous TID AC Rocco Lawrence DO      insulin lispro  1-6 Units Subcutaneous HS Lena Lawrence DO      metoprolol tartrate  50 mg Oral Q12H [address] Sony Murphy MD      midodrine  5 mg Oral BID AC TERS Lewis      nicotine  1 patch Transdermal Daily Sony Murphy MD      ondansetron  4 mg Intravenous Q6H PRN Sony Murphy MD      pantoprazole  40 mg Oral Early Morning Sony Murphy MD      polyethylene glycol  17 g Oral Daily PRN Sony Murphy MD      warfarin  10 mg Oral Daily (warfarin) Emily Daigle DO          Today, Patient Was Seen By: Emily Daigle DO    **Please Note: This note may have been constructed using a voice recognition system  **

## 2021-09-23 NOTE — ASSESSMENT & PLAN NOTE
· Likely due to chronic liver disease  · Follow the CBC  · Outpatient Hematology/Oncology evaluation    Results from last 7 days   Lab Units 09/23/21  0633 09/22/21  0424 09/21/21  0512   WBC Thousand/uL 2 00* 2 69* 3 33*   HEMOGLOBIN g/dL 8 1* 8 1* 8 0*   HEMATOCRIT % 28 1* 28 1* 26 7*   PLATELETS Thousands/uL 170 129* 195

## 2021-09-23 NOTE — ASSESSMENT & PLAN NOTE
Presented with lower abdominal pain onset on the night prior to admission  Sepsis criteria present with tachycardia, tachypnea, hypotension, leukocytosis, and fever, with associated organ dysfunction with evidence of MARISABEL  Source presumed to be skin and soft tissue in patient with evidence of panniculitis  Sepsis has resolved  · CT with dermal thickening and subcutaneous edema in the ventral lower pannus without collection, soft tissue gas, or inflammatory changes extending into the perineum  · Completed a 10-day course of broad-spectrum antibiotic treatment on 09/22/2021 with IV vancomycin and IV cefepime  · Initially on IV fluids - discontinued  · Levophed successfully weaned off  · Right-sided IJ TLC discontinued   Blood cultures with no growth X 5 days   Procalcitonin elevated 3 89 --> 7 87, 8 77, 7 42, 2 59, 1 51, 0 67   Was initiated on midodrine, octreotide, and additional albumin by Critical Care Medicine previously  All discontinued  [address] and Nephrology consulted and following

## 2021-09-23 NOTE — ASSESSMENT & PLAN NOTE
Lab Results   Component Value Date    EGFR 42 09/23/2021    EGFR 42 09/22/2021    EGFR 43 09/21/2021    CREATININE 1 70 (H) 09/23/2021    CREATININE 1 70 (H) 09/22/2021    CREATININE 1 69 (H) 09/21/2021   · Present on admission with a creatinine of 2 06, with baseline of 1 2 -1 4 - creatinine had been improving, worsened previously to a value of 2 86  Initial presentation with sepsis and septic shock - presumed prerenal etiology with creatinine improving with IV fluids  Patient with concurrent worsening anemia and hypoalbuminemia as well  Patient also has a history of right-sided and potentially diastolic CHF, and appears to be volume overloaded, with evidence of vascular congestion and IVC engorgement by CT, weight gain, and net positive fluid balance  Patient remains overall volume overloaded but improved - creatinine improved to 2 11 this morning with aggressive diuresis  · Remains off IV fluids  · Attempts at bladder scan unsuccessful due to pain - patient with overlying panniculitis  · Renal ultrasound without evidence of hydronephrosis  · Increased the lasix drip/infusion to 20 mg/hr on 09/21/2021  · Transitioned to lasix 80 mg IV BID on 09/22/2021  · Change to lasix 80 mg PO BID on 09/23/2021  · Continue to avoid nephrotoxins, and renally dose medications when appropriate  · Nephrology officially consulted and following

## 2021-09-23 NOTE — ASSESSMENT & PLAN NOTE
· Repleted per Nephrology  · Follow the potassium level and magnesium level    Results from last 7 days   Lab Units 09/23/21  0633 09/22/21  0424 09/21/21  0512   SODIUM mmol/L 141 139 142   POTASSIUM mmol/L 3 9 3 8 3 6   CHLORIDE mmol/L 101 98* 99*   CO2 mmol/L 34* 35* 36*   BUN mg/dL 57* 57* 55*   CREATININE mg/dL 1 70* 1 70* 1 69*   CALCIUM mg/dL 8 9 8 8 8 4

## 2021-09-23 NOTE — ASSESSMENT & PLAN NOTE
Known past medical history in the setting of CKD and cirrhosis, as well as colonic AVM's requiring APC in the past, and on chronic AC with warfarin  Baseline Hgb of 7-8  · Worsening anemia in the setting of acute illness  · FOBT negative X 2    · Has required transfusion with PRBCs, currently s/p 3 Units  · Continue to trend H/H - current stable  · Iron studies with low TIBC and elevated ferritin, consistent with anemia of chronic disease      Results from last 7 days   Lab Units 09/22/21  0424 09/21/21  0512 09/20/21  0457   WBC Thousand/uL 2 69* 3 33* 4 81   HEMOGLOBIN g/dL 8 1* 8 0* 8 4*   HEMATOCRIT % 28 1* 26 7* 27 6*   PLATELETS Thousands/uL 129* 195 190

## 2021-09-23 NOTE — ASSESSMENT & PLAN NOTE
Known past medical history in the setting of CKD and cirrhosis, as well as colonic AVM's requiring APC in the past, and on chronic AC with warfarin  Baseline Hgb of 7-8  · Worsening anemia in the setting of acute illness  · FOBT negative X 2    · Has required transfusion with PRBCs, currently s/p 3 Units  · Continue to trend H/H - current stable  · Iron studies with low TIBC and elevated ferritin, consistent with anemia of chronic disease      Results from last 7 days   Lab Units 09/23/21  0633 09/22/21  0424 09/21/21  0512   WBC Thousand/uL 2 00* 2 69* 3 33*   HEMOGLOBIN g/dL 8 1* 8 1* 8 0*   HEMATOCRIT % 28 1* 28 1* 26 7*   PLATELETS Thousands/uL 170 129* 195

## 2021-09-23 NOTE — PROGRESS NOTES
[facility]  Progress Note - Radha Gill 1960, 57 y o  male MRN: [id number]  Unit/Bed#: 156-25 Encounter: [id number]  Primary Care Provider: Bibi Silveira DO   Date and time admitted to hospital: 9/13/2021  9:51 AM    * Severe sepsis with septic shock [facility])  Assessment & Plan  Presented with lower abdominal pain onset on the night prior to admission  Sepsis criteria present with tachycardia, tachypnea, hypotension, leukocytosis, and fever, with associated organ dysfunction with evidence of MARISABEL  Source presumed to be skin and soft tissue in patient with evidence of panniculitis  Sepsis has resolved  · CT with dermal thickening and subcutaneous edema in the ventral lower pannus without collection, soft tissue gas, or inflammatory changes extending into the perineum  · Completed a 10-day course of broad-spectrum antibiotic treatment on 09/22/2021 with IV vancomycin and IV cefepime  · Initially on IV fluids - discontinued  · Levophed successfully weaned off  · Right-sided IJ TLC discontinued   Blood cultures with no growth X 5 days   Procalcitonin elevated 3 89 --> 7 87, 8 77, 7 42, 2 59, 1 51, 0 67   Was initiated on midodrine, octreotide, and additional albumin by Critical Care Medicine previously  All discontinued  [address] and Nephrology consulted and following  Panniculitis  Assessment & Plan  · Please see the plan for "Severe sepsis with septic shock"    Pleural effusion, right  Assessment & Plan  History of a chronic, partially loculated right pleural effusion with associated atelectasis at the right lung base  He was admitted from 05/26 through 06/09/2021 for this, and transferred to [facility] - treated with chest tube with infusion of tPA  Repeat scan on 05/30 revealed a loculated right hydropneumothorax, after which he underwent a right-sided thoracoscopic decortication 06/02/2021    He was scheduled for thoracentesis via IR on 09/14 - given clinical picture decided to hold off on thoracentesis  CT of the chest shows a large chronic right pleural effusion with probable partial loculations  Will continue to hold off repeat tap and monitor  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 ascites ([address] )  Assessment & Plan  Known past medical history secondary to alcoholic and fatty liver disease  · Follows outpatient with Gastroenterology; last seen on 08/04/2021  · Not a candidate for liver transplant given BMI and comorbidities  · Avoid hepatotoxic medications  · Serial lab monitoring    Pancytopenia (HCC)  Assessment & Plan  · Likely due to chronic liver disease  · Follow the CBC    Results from last 7 days   Lab Units 09/22/21  0424 09/21/21  0512 09/20/21  0457   WBC Thousand/uL 2 69* 3 33* 4 81   HEMOGLOBIN g/dL 8 1* 8 0* 8 4*   HEMATOCRIT % 28 1* 26 7* 27 6*   PLATELETS Thousands/uL 129* 195 190         NSVT (nonsustained ventricular tachycardia) (HCC)  Assessment & Plan  Brief run of non-sustained V-tach on cardiopulmonary monitoring on 09/15 late in the afternoon  Patient's EF approximately 6 months ago was preserved  Has remained in AFib and with sepsis, original septic shock  Was also maintained on Levophed and Cardizem gtt  Patient was asymptomatic and episode was very brief  Continue to monitor renal function and electrolytes carefully, and aim for potassium level greater than 4 and magnesium level greater than 2  Continue PO metoprolol    Outpatient follow-up with Cardiology        Hypoalbuminemia  Assessment & Plan  In the setting of cirrhosis  Given patient's septic shock, hypotension, and concurrent low albumin - has received albumin infusions  Chronic right-sided congestive heart failure [facility])  Assessment & Plan  Last ECHO 02/2021 with preserved LVEF, mildly increased LV wall thickness, and mildly reduced RV systolic function  · CT 9/13 with vascular congestion, IVC engorgement consistent with cardiac congestion    · Patient demonstrating weight gain and net positive fluid status  · Has received significant IV fluids in the setting of septic shock, which were discontinued 9/15  · Also with hypoalbuminemia which is contributing to worsening overall extremity edema  · Overall appears volume overloaded, but also with concurrent MARISABEL and low FENA  · Home diuretic therapy in the form of 80 mg furosemide BID has been on hold since admission  · Was initiated on IV furosemide 60 mg BID without adequate diuresis  · Initiated on lasix gtt 9/16 by Nephrology with good diuresis  · Continue to monitor daily weights, I/O's  · Increased the lasix drip/infusion to 20 mg/hr on 09/21/2021  · Transition to lasix 80 mg IV BID  · Dry weight approximately 300 lbs  Acute kidney injury superimposed on chronic kidney disease [facility])  Assessment & Plan  Lab Results   Component Value Date    EGFR 42 09/22/2021    EGFR 43 09/21/2021    EGFR 37 09/20/2021    CREATININE 1 70 (H) 09/22/2021    CREATININE 1 69 (H) 09/21/2021    CREATININE 1 88 (H) 09/20/2021   · Present on admission with a creatinine of 2 06, with baseline of 1 2 -1 4 - creatinine had been improving, worsened previously to a value of 2 86  Initial presentation with sepsis and septic shock - presumed prerenal etiology with creatinine improving with IV fluids  Patient with concurrent worsening anemia and hypoalbuminemia as well  Patient also has a history of right-sided and potentially diastolic CHF, and appears to be volume overloaded, with evidence of vascular congestion and IVC engorgement by CT, weight gain, and net positive fluid balance  Patient remains overall volume overloaded but improved - creatinine improved to 2 11 this morning with aggressive diuresis  · Remains off IV fluids  · Attempts at bladder scan unsuccessful due to pain - patient with overlying panniculitis  · Renal ultrasound without evidence of hydronephrosis    · Increased the lasix drip/infusion to 20 mg/hr on 09/21/2021  · Transition to lasix 80 mg IV BID  · Dry weight approximately 300 lbs  · Continue to avoid nephrotoxins, and renally dose medications when appropriate  · Nephrology officially consulted and following  Hyponatremia  Assessment & Plan  Baseline in the 130-140s range, presented with a value of 127 along with concurrent hypochloremia  In the setting of sepsis thought secondary to hypovolemia, but patient appears volume overloaded with sodium unchanged despite IV fluids  Current levels improving with diuresis and normalized today to 140  Low-sodium is very likely due to hypervolemic hyponatremia  Hyponatremia has resolved  Continue to monitor  Results from last 7 days   Lab Units 09/22/21  0424 09/21/21  0512 09/20/21  0457   SODIUM mmol/L 139 142 142   POTASSIUM mmol/L 3 8 3 6 3 2*   CHLORIDE mmol/L 98* 99* 97*   CO2 mmol/L 35* 36* 36*   BUN mg/dL 57* 55* 60*   CREATININE mg/dL 1 70* 1 69* 1 88*   CALCIUM mg/dL 8 8 8 4 8 4         Type 2 diabetes mellitus with hyperglycemia, without long-term current use of insulin [facility])  Assessment & Plan  Lab Results   Component Value Date    HGBA1C 5 5 05/21/2021       Recent Labs     09/21/21  2112 09/22/21  0700 09/22/21  1114 09/22/21  1619   POCGLU 164* 125 110 114       Blood Sugar Average: Last 72 hrs:  (P) 138 [id number]     · Not currently on any diabetic medications  · Insulin sliding scale with blood glucose monitoring ACHS   Outpatient follow-up with PCP in regards to this matter    Permanent atrial fibrillation [facility])  Assessment & Plan  History of AFib, presented with RVR in the setting of sepsis  Chronically on metoprolol succinate twice daily and AC with warfarin  · Required Cardizem gtt for rate control initially - weaned off  · Reintroduced metoprolol with tartrate formulation previously - increased dose again 9/17  · Warfarin was held in the setting of worsening anemia and previously supratherapeutic INR    · Resumed warfarin 9/18  · Increased coumadin to 7 mg PO Qdaily on 09/21/2021  · Continue to monitor heart rate with cardiopulmonary monitoring  · Heart rate is overall improved but remains mildly suboptimal - hold off on further titration of beta-blocker given mild hypotension in the setting of aggressive diuresis  Hypokalemia  Assessment & Plan  · Replete per Nephrology  · Follow the potassium level and magnesium level    Results from last 7 days   Lab Units 09/22/21  0424 09/21/21  0512 09/20/21  0457   SODIUM mmol/L 139 142 142   POTASSIUM mmol/L 3 8 3 6 3 2*   CHLORIDE mmol/L 98* 99* 97*   CO2 mmol/L 35* 36* 36*   BUN mg/dL 57* 55* 60*   CREATININE mg/dL 1 70* 1 69* 1 88*   CALCIUM mg/dL 8 8 8 4 8 4         Chronic anemia  Assessment & Plan  Known past medical history in the setting of CKD and cirrhosis, as well as colonic AVM's requiring APC in the past, and on chronic AC with warfarin  Baseline Hgb of 7-8  · Worsening anemia in the setting of acute illness  · FOBT negative X 2    · Has required transfusion with PRBCs, currently s/p 3 Units  · Continue to trend H/H - current stable  · Iron studies with low TIBC and elevated ferritin, consistent with anemia of chronic disease  Results from last 7 days   Lab Units 09/22/21  0424 09/21/21  0512 09/20/21  0457   WBC Thousand/uL 2 69* 3 33* 4 81   HEMOGLOBIN g/dL 8 1* 8 0* 8 4*   HEMATOCRIT % 28 1* 26 7* 27 6*   PLATELETS Thousands/uL 129* 195 190         STEFF (obstructive sleep apnea)  Assessment & Plan  · Known past medical history with CPAP compliance at home  Continue CPAP HS per home settings  Chronic venous insufficiency  Assessment & Plan  Chronic venous insufficiency with concurrent hypoalbuminemia, cirrhosis, and right-sided CHF  Patient has also been fluid resuscitated with worsening lower extremity edema  VTE Pharmacologic Prophylaxis: VTE Score: 7 High Risk (Score >/= 5) - Pharmacological DVT Prophylaxis Ordered: warfarin (Coumadin)  Sequential Compression Devices Ordered  Patient Centered Rounds: I performed bedside rounds with nursing staff today  Discussions with Specialists or Other Care Team Provider: I discussed the case with Nephrology  Time Spent for Care: 30 minutes  More than 50% of total time spent on counseling and coordination of care as described above  Current Length of Stay: 9 day(s)  Current Patient Status: Inpatient   Certification Statement: The patient will continue to require additional inpatient hospital stay due to the need for IV lasix treatment  Discharge Plan: Anticipate discharge in 48 hrs to home with home services  Code Status: Level 1 - Full Code    Subjective: The patient was seen and examined  The patient is doing better  No chest pain  No shortness of breath  No abdominal pain  No nausea or vomiting  Objective:     Vitals:   Temp (24hrs), Av 9 °F (36 6 °C), Min:97 4 °F (36 3 °C), Max:98 2 °F (36 8 °C)    Temp:  [97 4 °F (36 3 °C)-98 2 °F (36 8 °C)] 98 2 °F (36 8 °C)  HR:  [] 99  Resp:  [16-18] 18  BP: ()/(63-72) 100/68  SpO2:  [96 %-99 %] 96 %  Body mass index is 43 03 kg/m²  Input and Output Summary (last 24 hours):      Intake/Output Summary (Last 24 hours) at 2021  Last data filed at 2021 1754  Gross per 24 hour   Intake 1000 ml   Output 2300 ml   Net -1300 ml       Physical Exam:   Physical Exam   General:  NAD, follows commands  HEENT:  NC/AT, mucous membranes moist  Neck:  Supple, No JVP elevation  CV:  + S1, + S2, Intermittent tachycardia, Irregularly irregular rhythm  Pulm:  Lung fields are CTA bilaterally  Abd:  Soft, Non-tender, Non-distended  Ext:  No clubbing/cyanosis, Improving edema of the bilateral lower extremities  Skin:  No rashes  Neuro:  Awake, alert, oriented  Psych:  Normal mood and affect      Additional Data:    Labs:  Results from last 7 days   Lab Units 21  0424 21  0902 21  0436   WBC Thousand/uL 2 69*   < > 5  76   HEMOGLOBIN g/dL 8 1*  --  7 4*   HEMATOCRIT % 28 1*  --  24 6*   PLATELETS Thousands/uL 129*   < > 209   BANDS PCT %  --   --  2   NEUTROS PCT % 67   < >  --    LYMPHS PCT % 15   < >  --    LYMPHO PCT %  --   --  11*   MONOS PCT % 9   < >  --    MONO PCT %  --   --  6   EOS PCT % 5   < > 0    < > = values in this interval not displayed  Results from last 7 days   Lab Units 09/22/21  0424   SODIUM mmol/L 139   POTASSIUM mmol/L 3 8   CHLORIDE mmol/L 98*   CO2 mmol/L 35*   BUN mg/dL 57*   CREATININE mg/dL 1 70*   ANION GAP mmol/L 6   CALCIUM mg/dL 8 8   ALBUMIN g/dL 2 7*   TOTAL BILIRUBIN mg/dL 0 81   ALK PHOS U/L 191*   ALT U/L 18   AST U/L 24   GLUCOSE RANDOM mg/dL 138     Results from last 7 days   Lab Units 09/22/21  0424   INR  1 69*     Results from last 7 days   Lab Units 09/22/21  1619 09/22/21  1114 09/22/21  0700 09/21/21  2112 09/21/21  1549 09/21/21  1056 09/21/21  0708 09/20/21  2051 09/20/21  1521 09/20/21  1057 09/20/21  0641 09/19/21  1601   POC GLUCOSE mg/dl 114 110 125 164* 135 168* 124 146* 143* 152* 157* 123         Results from last 7 days   Lab Units 09/21/21  0512 09/20/21  0457 09/19/21  0602 09/18/21  0553 09/16/21  0452   PROCALCITONIN ng/ml 0 67* 0 95* 1 51* 2 59* 7 42*       Lines/Drains:  Invasive Devices     Peripheral Intravenous Line            Peripheral IV 09/22/21 Dorsal (posterior); Right Forearm <1 day                      Imaging: No pertinent imaging reviewed      Recent Cultures (last 7 days):   Results from last 7 days   Lab Units 09/20/21  1554   URINE CULTURE  No Growth <1000 cfu/mL       Last 24 Hours Medication List:   Current Facility-Administered Medications   Medication Dose Route Frequency Provider Last Rate    acetaZOLAMIDE  250 mg Oral Q12H [facility] Meghna Hacking, CRNP      furosemide  80 mg Intravenous BID (diuretic) TRES Carrillo      HYDROmorphone  0 5 mg Intravenous Q6H PRN Chloe Mitchell MD      insulin lispro  1-6 Units Subcutaneous TID [facility] DO Howard      insulin lispro  1-6 Units Subcutaneous HS Juanna Mood DO Howard      metoprolol tartrate  50 mg Oral Q12H [address] Celena Tavera MD      midodrine  5 mg Oral TID AC TRES Garcia      nicotine  1 patch Transdermal Daily Celena Tavera MD      ondansetron  4 mg Intravenous Q6H PRN Celena Tavera MD      pantoprazole  40 mg Oral Early Morning Celena Tavera MD      polyethylene glycol  17 g Oral Daily PRN Celena Tavera MD      potassium chloride  20 mEq Oral BID With Meals TRES Garcia      warfarin  7 mg Oral Daily (warfarin) Kayla Barker DO          Today, Patient Was Seen By: Kayla Barker DO    **Please Note: This note may have been constructed using a voice recognition system  **

## 2021-09-23 NOTE — ASSESSMENT & PLAN NOTE
Baseline in the 130-140s range, presented with a value of 127 along with concurrent hypochloremia  In the setting of sepsis thought secondary to hypovolemia, but patient appears volume overloaded with sodium unchanged despite IV fluids  Current levels improving with diuresis and normalized today to 140  Low-sodium is very likely due to hypervolemic hyponatremia  Hyponatremia has resolved  Continue to monitor      Results from last 7 days   Lab Units 09/23/21  0633 09/22/21  0424 09/21/21  0512   SODIUM mmol/L 141 139 142   POTASSIUM mmol/L 3 9 3 8 3 6   CHLORIDE mmol/L 101 98* 99*   CO2 mmol/L 34* 35* 36*   BUN mg/dL 57* 57* 55*   CREATININE mg/dL 1 70* 1 70* 1 69*   CALCIUM mg/dL 8 9 8 8 8 4

## 2021-09-23 NOTE — ASSESSMENT & PLAN NOTE
Lab Results   Component Value Date    HGBA1C 5 5 05/21/2021       Recent Labs     09/22/21  1619 09/22/21  2154 09/23/21  0741 09/23/21  1114   POCGLU 114 127 125 106       Blood Sugar Average: Last 72 hrs:  (P) 135 [id number]     · Not currently on any diabetic medications  · Insulin sliding scale with blood glucose monitoring ACHS   Outpatient follow-up with PCP in regards to this matter

## 2021-09-23 NOTE — ASSESSMENT & PLAN NOTE
· Replete per Nephrology  · Follow the potassium level and magnesium level    Results from last 7 days   Lab Units 09/22/21  0424 09/21/21  0512 09/20/21  0457   SODIUM mmol/L 139 142 142   POTASSIUM mmol/L 3 8 3 6 3 2*   CHLORIDE mmol/L 98* 99* 97*   CO2 mmol/L 35* 36* 36*   BUN mg/dL 57* 55* 60*   CREATININE mg/dL 1 70* 1 69* 1 88*   CALCIUM mg/dL 8 8 8 4 8 4

## 2021-09-23 NOTE — ASSESSMENT & PLAN NOTE
Last ECHO 02/2021 with preserved LVEF, mildly increased LV wall thickness, and mildly reduced RV systolic function  · CT 9/13 with vascular congestion, IVC engorgement consistent with cardiac congestion  · Patient demonstrating weight gain and net positive fluid status  · Has received significant IV fluids in the setting of septic shock, which were discontinued 9/15  · Also with hypoalbuminemia which is contributing to worsening overall extremity edema  · Overall appears volume overloaded, but also with concurrent MARISABEL and low FENA  · Home diuretic therapy in the form of 80 mg furosemide BID has been on hold since admission  · Was initiated on IV furosemide 60 mg BID without adequate diuresis  · Initiated on lasix gtt 9/16 by Nephrology with good diuresis    · Continue to monitor daily weights, I/O's  · Increased the lasix drip/infusion to 20 mg/hr on 09/21/2021  · Transitioned to lasix 80 mg IV BID on 09/22/2021  · Change to lasix 80 mg PO BID on 09/23/2021

## 2021-09-23 NOTE — PROGRESS NOTES
NEPHROLOGY PROGRESS NOTE   Lucillejon Fairchild 57 y o  male MRN: [id number]  Unit/Bed#: 526-71 Encounter: [id number]    Assessment/Plan:    Roselia Glez a 57 y  o  male whose pertinent medical problems include CKD 3, CdHF, a-fib, cirrhosis, obesity, lymphedema admitted 9/13/21 with chief complaint of abdominal pain being treated for septic shock (poa/resolved) secondary to panniculitis   Renal following along for acute kidney injury atop chronic kidney disease, cardiorenal syndrome, electrolyte management  Tentative discharge tomorrow      1  Acute kidney injury (POA) atop chronic kidney disease due to cardiorenal syndrome  ? Renal function relatively unchanged over the last 3 days  As previously mentioned, feel he will require slower more gentle diuresis to avoid precipitous rise in creatinine  IV Lasix transition oral today and he has tentative for discharge tomorrow  He has metolazone at home that he will utilize for weight gain/resistant diuresis  He will require close follow-up with Nephrology and Cardiology upon discharge  Continue to avoid potential nephrotoxins  2  Stage 3 chronic kidney disease with baseline creatinine around 1 2 mg/dL  3  Cardiorenal syndrome type 1, recurrent  ? As per #1  4  Acute on chronic diastolic congestive heart failure  5  Hypervolemic hyponatremia   6  Hypokalemia   ? Give 20 mEq oral potassium today  7  Septic shock (POA/shock resolved) due to panniculitis  ? Shock is resolved  Management per primary team   8  Hypotension  ? In preparation for discharge, will reduce midodrine to 5 mg with breakfast and lunch  Would not discharge on a p m  Dose  9   Metabolic alkalosis  ? Status post 4 doses of Diamox  Serum bicarbonate stable  10  Atrial fibrillation with a rapid ventricular response-  per Cardiology a primary team    ROS  No physical complaints on exam   A complete 10 point review of systems have been performed and are otherwise negative         Historical Information Past Medical History:   Diagnosis Date    A-fib [facility])     Acute renal failure superimposed on stage 3b chronic kidney disease ([place] [address] ) 03/15/2021    Cardiac disease     Chronic combined systolic (congestive) and diastolic (congestive) heart failure (HCC)     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 ascites (HCC)     Class 3 severe obesity due to excess calories with serious comorbidity and body mass index (BMI) of 45 0 to 49 9 in adult [facility]) 10/17/2018    Diabetes mellitus ([place] [address] )     Dilated cardiomyopathy ([place] [address] )     History of echocardiogram 11/24/2014    EF 0 30 (30%), Likely mod LV systolic dysfunction  Likely RV dysfunction as well   History of Lexiscan MPI 02/19/2016    EF 0 43 (43%), no prior MI or ischemia   Hx of echocardiogram 04/28/2017    Normal EF, Normal LV systolic function  Mild concentric LV hypertrophy  Mild mitral and tricuspid regurg   Hx: recurrent pneumonia     Hypertension     Hypokalemia 09/04/2020    Long term (current) use of anticoagulants     Morbid (severe) obesity due to excess calories (HCC)     Neuropathy     Pleural effusion on right     Stage 3 chronic kidney disease [facility])      Past Surgical History:   Procedure Laterality Date    HERNIA REPAIR      IR CHEST TUBE CHECK/CHANGE/REPOSITION/UPSIZE  5/27/2021    IR CHEST TUBE PLACEMENT  5/22/2021    IR THORACENTESIS  8/25/2020    IR THORACENTESIS  2/23/2021    LUNG DECORTICATION Right 6/2/2021    Procedure: DECORTICATION LUNG;  Surgeon: Shanna Mcclendon MD;  [facility];  Service: Thoracic    SPLENECTOMY, TOTAL      THORACOSCOPY VIDEO ASSISTED SURGERY (VATS) Right 6/2/2021    Procedure: THORACOSCOPY VIDEO ASSISTED SURGERY (VATS);   Surgeon: Shanna Mcclendon MD;  [facility];  Service: Thoracic    VASCULAR SURGERY Right     leg     Social History   Social History     Substance and Sexual Activity   Alcohol Use Yes    Comment: 4 or 5 drinks weekly/bloody chani's     Social History     Substance and Sexual Activity   Drug Use Never     Social History     Tobacco Use   Smoking Status Current Some Day Smoker    Packs/day: 0 25    Types: Cigarettes   Smokeless Tobacco Never Used   Tobacco Comment    smokes 1 pack per week and a half        Family History:   History reviewed  No pertinent family history  Medications:  Pertinent medications were reviewed  Current Facility-Administered Medications   Medication Dose Route Frequency Provider Last Rate    furosemide  80 mg Oral BID (diuretic) Ann Lawrence DO      HYDROmorphone  0 5 mg Intravenous Q6H PRN Bubba Mayorga MD      insulin lispro  1-6 Units Subcutaneous TID TRISR [facility] Ann Lawrence DO      insulin lispro  1-6 Units Subcutaneous HS Ann Lawrence DO      metoprolol tartrate  50 mg Oral Q12H [address] Bubba Mayorga MD      midodrine  5 mg Oral TID AC TRES Gibson      nicotine  1 patch Transdermal Daily Bubba Mayorga MD      ondansetron  4 mg Intravenous Q6H PRN Bubba Mayorga MD      pantoprazole  40 mg Oral Early Morning Bubba Mayorga MD      polyethylene glycol  17 g Oral Daily PRN Bubba Mayorga MD      warfarin  7 mg Oral Daily (warfarin) Ann Lawrence DO           No Known Allergies      Vitals:   /69   Pulse (!) 110   Temp 97 9 °F (36 6 °C)   Resp 16   Ht 6' (1 829 m)   Wt (!) 144 kg (317 lb 3 9 oz)   SpO2 98%   BMI 43 03 kg/m²   Body mass index is 43 03 kg/m²  SpO2: 98 %,   SpO2 Activity: At Rest,   O2 Device: None (Room air)      Intake/Output Summary (Last 24 hours) at 9/23/2021 1028  Last data filed at 9/23/2021 0824  Gross per 24 hour   Intake 180 ml   Output 1200 ml   Net -1020 ml     Invasive Devices     Peripheral Intravenous Line            Peripheral IV 09/22/21 Dorsal (posterior); Right Forearm <1 day                Physical Exam  General: conscious, cooperative, in no acute distress remains on room air  Eyes: conjunctivae pink, anicteric sclerae  ENT: lips and mucous membranes moist  Neck: supple, no JVD, no masses  Chest:  Diminished breath sounds bilaterally, no crackles, ronchus or wheezings  CVS: S1 & S2, normal rate, regular rhythm  Abdomen: soft, non-tender, non-distended, normoactive bowel sounds obese  Extremities:  Severe edema of both legs  Skin: no rash  Neuro: awake, alert, oriented      Diagnostic Data:  Lab: I have personally reviewed pertinent lab results  ,   CBC:  Results from last 7 days   Lab Units 09/23/21  0633   WBC Thousand/uL 2 00*   HEMOGLOBIN g/dL 8 1*   HEMATOCRIT % 28 1*   PLATELETS Thousands/uL 170      CMP:   Lab Results   Component Value Date    SODIUM 141 09/23/2021    K 3 9 09/23/2021     09/23/2021    CO2 34 (H) 09/23/2021    BUN 57 (H) 09/23/2021    CREATININE 1 70 (H) 09/23/2021    CALCIUM 8 9 09/23/2021    AST 23 09/23/2021    ALT 16 09/23/2021    ALKPHOS 177 (H) 09/23/2021    EGFR 42 09/23/2021   ,   PT/INR:   Lab Results   Component Value Date    INR 1 69 (H) 09/23/2021   ,   Magnesium: No components found for: MAG,  Phosphorous:   Lab Results   Component Value Date    PHOS 4 4 (H) 09/23/2021       Microbiology:  @[email or web address],(urinecx:7)@        TRES Garcia    Portions of the record may have been created with voice recognition software  Occasional wrong word or "sound a like" substitutions may have occurred due to the inherent limitations of voice recognition software  Read the chart carefully and recognize, using context, where substitutions have occurred

## 2021-09-23 NOTE — ASSESSMENT & PLAN NOTE
History of AFib, presented with RVR in the setting of sepsis  Chronically on metoprolol succinate twice daily and AC with warfarin  · Required Cardizem gtt for rate control initially - weaned off  · Reintroduced metoprolol with tartrate formulation previously - increased dose again 9/17  · Warfarin was held in the setting of worsening anemia and previously supratherapeutic INR  · Resumed warfarin 9/18  · Increased coumadin to 10 mg PO Qdaily on 09/23/2021  · Continue to monitor heart rate with cardiopulmonary monitoring  · Heart rate is overall improved but remains mildly suboptimal - hold off on further titration of beta-blocker given mild hypotension in the setting of aggressive diuresis

## 2021-09-23 NOTE — ASSESSMENT & PLAN NOTE
History of a chronic, partially loculated right pleural effusion with associated atelectasis at the right lung base  He was admitted from 05/26 through 06/09/2021 for this, and transferred to [facility] - treated with chest tube with infusion of tPA  Repeat scan on 05/30 revealed a loculated right hydropneumothorax, after which he underwent a right-sided thoracoscopic decortication 06/02/2021  He was scheduled for thoracentesis via IR on 09/14 - given clinical picture decided to hold off on thoracentesis  CT of the chest shows a large chronic right pleural effusion with probable partial loculations  Will continue to hold off repeat tap and monitor

## 2021-09-23 NOTE — ASSESSMENT & PLAN NOTE
· Likely due to chronic liver disease  · Follow the absolute neutrophil count  · Follow the CBC  · Outpatient Hematology/Oncology evaluation

## 2021-09-24 NOTE — ASSESSMENT & PLAN NOTE
· Likely due to chronic liver disease  · Follow the absolute neutrophil count  · Follow the CBC after discharge with his PCP  · Outpatient Hematology/Oncology evaluation

## 2021-09-24 NOTE — ASSESSMENT & PLAN NOTE
Baseline in the 130-140s range, presented with a value of 127 along with concurrent hypochloremia  In the setting of sepsis thought secondary to hypovolemia, but patient appears volume overloaded with sodium unchanged despite IV fluids  Current levels improving with diuresis and normalized today to 140  Low-sodium is very likely due to hypervolemic hyponatremia  Hyponatremia has resolved  Continue to monitor after discharge      Results from last 7 days   Lab Units 09/24/21  0429 09/23/21  0633 09/22/21  0424   SODIUM mmol/L 141 141 139   POTASSIUM mmol/L 3 5 3 9 3 8   CHLORIDE mmol/L 100 101 98*   CO2 mmol/L 33* 34* 35*   BUN mg/dL 57* 57* 57*   CREATININE mg/dL 1 76* 1 70* 1 70*   CALCIUM mg/dL 8 9 8 9 8 8

## 2021-09-24 NOTE — TELEPHONE ENCOUNTER
----- Message from Jeremiah Denny sent at 9/24/2021  1:02 PM EDT -----  Discharged from [place] today  Please schedule for hospital follow-up 2-3 weeks  Blood work prior to appt  Cristina Yip)  Pediatrics  [address] [place] [address], [address]  [place] [address]  Phone: [phone]  Fax: [phone]  Follow Up Time: 1-3 Days

## 2021-09-24 NOTE — ASSESSMENT & PLAN NOTE
Presented with lower abdominal pain onset on the night prior to admission  Sepsis criteria present with tachycardia, tachypnea, hypotension, leukocytosis, and fever, with associated organ dysfunction with evidence of MARISABEL  Source presumed to be skin and soft tissue in patient with evidence of panniculitis  Sepsis has resolved  · CT with dermal thickening and subcutaneous edema in the ventral lower pannus without collection, soft tissue gas, or inflammatory changes extending into the perineum  · Completed a 10-day course of broad-spectrum antibiotic treatment on 09/22/2021 with IV vancomycin and IV cefepime  · Initially on IV fluids - discontinued  · Levophed successfully weaned off  · Right-sided IJ TLC discontinued   Blood cultures with no growth X 5 days   Procalcitonin elevated 3 89 --> 7 87, 8 77, 7 42, 2 59, 1 51, 0 67   Was initiated on midodrine, octreotide, and additional albumin by Critical Care Medicine previously  All discontinued  [address] and Nephrology consulted

## 2021-09-24 NOTE — NURSING NOTE
AVS printed and reviewed with patient  Patient verbalized understanding    No home care ordered on d/c

## 2021-09-24 NOTE — PLAN OF CARE
Problem: Potential for Falls  Goal: Patient will remain free of falls  Description: INTERVENTIONS:  - Educate patient/family on patient safety including physical limitations  - Instruct patient to call for assistance with activity   - Consult OT/PT to assist with strengthening/mobility   - Keep Call bell within reach  - Keep bed low and locked with side rails adjusted as appropriate  - Keep care items and personal belongings within reach  - Initiate and maintain comfort rounds  - Make Fall Risk Sign visible to staff  - Offer Toileting every 2 Hours, in advance of need  - Initiate/Maintain chairalarm  - Obtain necessary fall risk management equipment:   - Apply yellow socks and bracelet for high fall risk patients  - Consider moving patient to room near nurses station  9/24/2021 1111 by Edward Eduardo RN  Outcome: Completed  9/24/2021 0741 by Edward Eduardo RN  Outcome: Progressing     Problem: MOBILITY - ADULT  Goal: Maintain or return to baseline ADL function  Description: INTERVENTIONS:  -  Assess patient's ability to carry out ADLs; assess patient's baseline for ADL function and identify physical deficits which impact ability to perform ADLs (bathing, care of mouth/teeth, toileting, grooming, dressing, etc )  - Assess/evaluate cause of self-care deficits   - Assess range of motion  - Assess patient's mobility; develop plan if impaired  - Assess patient's need for assistive devices and provide as appropriate  - Encourage maximum independence but intervene and supervise when necessary  - Involve family in performance of ADLs  - Assess for home care needs following discharge   - Consider OT consult to assist with ADL evaluation and planning for discharge  - Provide patient education as appropriate  9/24/2021 1111 by Edward Eduardo RN  Outcome: Completed  9/24/2021 0741 by Edward Eduardo RN  Outcome: Progressing  Goal: Maintains/Returns to pre admission functional level  Description: INTERVENTIONS:  - Perform BMAT or MOVE assessment daily    - Set and communicate daily mobility goal to care team and patient/family/caregiver     - Collaborate with rehabilitation services on mobility goals if consulted  - Stand patient 2 times a day  - Ambulate patient 2 times a day  - Out of bed to chair 3 times a day   - Out of bed for meals 3 times a day  - Out of bed for toileting  - Record patient progress and toleration of activity level   9/24/2021 1111 by Dasha Shelton RN  Outcome: Completed  9/24/2021 0741 by Dasha Shelton RN  Outcome: Progressing     Problem: CARDIOVASCULAR - ADULT  Goal: Maintains optimal cardiac output and hemodynamic stability  Description: INTERVENTIONS:  - Monitor I/O, vital signs and rhythm  - Monitor for S/S and trends of decreased cardiac output  - Administer and titrate ordered vasoactive medications to optimize hemodynamic stability  - Assess quality of pulses, skin color and temperature  - Assess for signs of decreased coronary artery perfusion  - Instruct patient to report change in severity of symptoms  9/24/2021 1111 by Dasha Shelton RN  Outcome: Completed  9/24/2021 0741 by Dasha Shelton RN  Outcome: Progressing  Goal: Absence of cardiac dysrhythmias or at baseline rhythm  Description: INTERVENTIONS:  - Continuous cardiac monitoring, vital signs, obtain 12 lead EKG if ordered  - Administer antiarrhythmic and heart rate control medications as ordered  - Monitor electrolytes and administer replacement therapy as ordered  9/24/2021 1111 by Dasha Shelton RN  Outcome: Completed  9/24/2021 0741 by Dasha Shelton RN  Outcome: Progressing     Problem: GASTROINTESTINAL - ADULT  Goal: Maintains or returns to baseline bowel function  Description: INTERVENTIONS:  - Assess bowel function  - Encourage oral fluids to ensure adequate hydration  - Administer IV fluids if ordered to ensure adequate hydration  - Administer ordered medications as needed  - Encourage mobilization and activity  - Consider nutritional services referral to assist patient with adequate nutrition and appropriate food choices  9/24/2021 1111 by Alexus Jo RN  Outcome: Completed  9/24/2021 0741 by Alexus Jo RN  Outcome: Progressing  Goal: Maintains adequate nutritional intake  Description: INTERVENTIONS:  - Monitor percentage of each meal consumed  - Identify factors contributing to decreased intake, treat as appropriate  - Assist with meals as needed  - Monitor I&O, weight, and lab values if indicated  - Obtain nutrition services referral as needed  9/24/2021 1111 by Alexus Jo RN  Outcome: Completed  9/24/2021 0741 by Alexus Jo RN  Outcome: Progressing     Problem: GENITOURINARY - ADULT  Goal: Maintains or returns to baseline urinary function  Description: INTERVENTIONS:  - Assess urinary function  - Encourage oral fluids to ensure adequate hydration if ordered  - Administer IV fluids as ordered to ensure adequate hydration  - Administer ordered medications as needed  - Offer frequent toileting  - Follow urinary retention protocol if ordered  9/24/2021 1111 by Alexus Jo RN  Outcome: Completed  9/24/2021 0741 by Alexus Jo RN  Outcome: Progressing     Problem: METABOLIC, FLUID AND ELECTROLYTES - ADULT  Goal: Electrolytes maintained within normal limits  Description: INTERVENTIONS:  - Monitor labs and assess patient for signs and symptoms of electrolyte imbalances  - Administer electrolyte replacement as ordered  - Monitor response to electrolyte replacements, including repeat lab results as appropriate  - Instruct patient on fluid and nutrition as appropriate  9/24/2021 1111 by Alexus Jo RN  Outcome: Completed  9/24/2021 0741 by Alexus Jo RN  Outcome: Progressing  Goal: Fluid balance maintained  Description: INTERVENTIONS:  - Monitor labs   - Monitor I/O and WT  - Instruct patient on fluid and nutrition as appropriate  - Assess for signs & symptoms of volume excess or deficit  9/24/2021 1111 by Germán Jarvis RN  Outcome: Completed  9/24/2021 0741 by Germán Jarvis RN  Outcome: Progressing  Goal: Glucose maintained within target range  Description: INTERVENTIONS:  - Monitor Blood Glucose as ordered  - Assess for signs and symptoms of hyperglycemia and hypoglycemia  - Administer ordered medications to maintain glucose within target range  - Assess nutritional intake and initiate nutrition service referral as needed  9/24/2021 1111 by Germán Jarvis RN  Outcome: Completed  9/24/2021 0741 by Germán Jarvis RN  Outcome: Progressing     Problem: SKIN/TISSUE INTEGRITY - ADULT  Goal: Skin Integrity remains intact(Skin Breakdown Prevention)  Description: Assess:  -Perform Danyel assessment every shift  -Clean and moisturize skin every shift  -Inspect skin when repositioning, toileting, and assisting with ADLS  -Assess under medical devices such  -Assess extremities for adequate circulation and sensation     Bed Management:  -Have minimal linens on bed & keep smooth, unwrinkled  -Change linens as needed when moist or perspiring  -Avoid sitting or lying in one position for more than 2 hours while in bed  -Keep HOB at 45degrees     Toileting:  -Offer bedside commode  -Assess for incontinence every shift  -Use incontinent care products after each incontinent episode    Activity:  -Mobilize patient 2 times a day  -Encourage activity and walks on unit  -Encourage or provide ROM exercises   -Turn and reposition patient every 2 Hours  -Use appropriate equipment to lift or move patient in bed  -Instruct/ Assist with weight shifting every hour when out of bed in chair  -Consider limitation of chair time 4 hour intervals    Skin Care:  -Avoid use of baby powder, tape, friction and shearing, hot water or constrictive clothing  -Relieve pressure over bony prominences  -Do not massage red bony areas    Next Steps:  -Teach patient strategies to minimize risks    -Consider consults to interdisciplinary teams  9/24/2021 1111 by Germán Jarvis RN  Outcome: Completed  9/24/2021 0741 by Germán Jarvis RN  Outcome: Progressing     Problem: HEMATOLOGIC - ADULT  Goal: Maintains hematologic stability  Description: INTERVENTIONS  - Assess for signs and symptoms of bleeding or hemorrhage  - Monitor labs  - Administer supportive blood products/factors as ordered and appropriate  9/24/2021 1111 by Gremán Jarvis RN  Outcome: Completed  9/24/2021 0741 by Germán Jarvis RN  Outcome: Progressing     Problem: MUSCULOSKELETAL - ADULT  Goal: Maintain or return mobility to safest level of function  Description: INTERVENTIONS:  - Assess patient's ability to carry out ADLs; assess patient's baseline for ADL function and identify physical deficits which impact ability to perform ADLs (bathing, care of mouth/teeth, toileting, grooming, dressing, etc )  - Assess/evaluate cause of self-care deficits   - Assess range of motion  - Assess patient's mobility  - Assess patient's need for assistive devices and provide as appropriate  - Encourage maximum independence but intervene and supervise when necessary  - Involve family in performance of ADLs  - Assess for home care needs following discharge   - Consider OT consult to assist with ADL evaluation and planning for discharge  - Provide patient education as appropriate  9/24/2021 1111 by Germán Jarvis RN  Outcome: Completed  9/24/2021 0741 by Germán Jarvis RN  Outcome: Progressing  Goal: Maintain proper alignment of affected body part  Description: INTERVENTIONS:  - Support, maintain and protect limb and body alignment  - Provide patient/ family with appropriate education  9/24/2021 1111 by Germán Jarvis RN  Outcome: Completed  9/24/2021 0741 by Germán Jarvis RN  Outcome: Progressing     Problem: PAIN - ADULT  Goal: Verbalizes/displays adequate comfort level or baseline comfort level  Description: Interventions:  - Encourage patient to monitor pain and request assistance  - Assess pain using appropriate pain scale  - Administer analgesics based on type and severity of pain and evaluate response  - Implement non-pharmacological measures as appropriate and evaluate response  - Consider cultural and social influences on pain and pain management  - Notify physician/advanced practitioner if interventions unsuccessful or patient reports new pain  9/24/2021 1111 by Heather Case RN  Outcome: Completed  9/24/2021 0741 by Heather Case RN  Outcome: Progressing     Problem: INFECTION - ADULT  Goal: Absence or prevention of progression during hospitalization  Description: INTERVENTIONS:  - Assess and monitor for signs and symptoms of infection  - Monitor lab/diagnostic results  - Monitor all insertion sites, i e  indwelling lines, tubes, and drains  - Monitor endotracheal if appropriate and nasal secretions for changes in amount and color  - [place] appropriate cooling/warming therapies per order  - Administer medications as ordered  - Instruct and encourage patient and family to use good hand hygiene technique  - Identify and instruct in appropriate isolation precautions for identified infection/condition  9/24/2021 1111 by Heather Case RN  Outcome: Completed  9/24/2021 0741 by Heather Case RN  Outcome: Progressing     Problem: SAFETY ADULT  Goal: Patient will remain free of falls  Description: INTERVENTIONS:  - Educate patient/family on patient safety including physical limitations  - Instruct patient to call for assistance with activity   - Consult OT/PT to assist with strengthening/mobility   - Keep Call bell within reach  - Keep bed low and locked with side rails adjusted as appropriate  - Keep care items and personal belongings within reach  - Initiate and maintain comfort rounds  - Make Fall Risk Sign visible to staff  - Offer Toileting every 2 Hours, in advance of need  - Initiate/Maintain chairalarm  - Obtain necessary fall risk management equipment: - Apply yellow socks and bracelet for high fall risk patients  - Consider moving patient to room near nurses station  9/24/2021 1111 by Blayne Bennett RN  Outcome: Completed  9/24/2021 0741 by Blayne Bennett RN  Outcome: Progressing  Goal: Maintain or return to baseline ADL function  Description: INTERVENTIONS:  -  Assess patient's ability to carry out ADLs; assess patient's baseline for ADL function and identify physical deficits which impact ability to perform ADLs (bathing, care of mouth/teeth, toileting, grooming, dressing, etc )  - Assess/evaluate cause of self-care deficits   - Assess range of motion  - Assess patient's mobility; develop plan if impaired  - Assess patient's need for assistive devices and provide as appropriate  - Encourage maximum independence but intervene and supervise when necessary  - Involve family in performance of ADLs  - Assess for home care needs following discharge   - Consider OT consult to assist with ADL evaluation and planning for discharge  - Provide patient education as appropriate  9/24/2021 1111 by Blayne Bennett RN  Outcome: Completed  9/24/2021 0741 by Blayne Bennett RN  Outcome: Progressing  Goal: Maintains/Returns to pre admission functional level  Description: INTERVENTIONS:  - Perform BMAT or MOVE assessment daily    - Set and communicate daily mobility goal to care team and patient/family/caregiver     - Collaborate with rehabilitation services on mobility goals if consulted  - Stand patient 2 times a day  - Ambulate patient 2 times a day  - Out of bed to chair 3 times a day   - Out of bed for meals 3 times a day  - Out of bed for toileting  - Record patient progress and toleration of activity level   9/24/2021 1111 by Blayne Bennett RN  Outcome: Completed  9/24/2021 0741 by Blayne Bennett RN  Outcome: Progressing     Problem: DISCHARGE PLANNING  Goal: Discharge to home or other facility with appropriate resources  Description: INTERVENTIONS:  - Identify barriers to discharge w/patient and caregiver  - Arrange for needed discharge resources and transportation as appropriate  - Identify discharge learning needs (meds, wound care, etc )  - Arrange for interpretive services to assist at discharge as needed  - Refer to Case Management Department for coordinating discharge planning if the patient needs post-hospital services based on physician/advanced practitioner order or complex needs related to functional status, cognitive ability, or social support system  9/24/2021 1111 by Helen Mohan RN  Outcome: Completed  9/24/2021 0741 by Helen Mohan RN  Outcome: Progressing     Problem: Knowledge Deficit  Goal: Patient/family/caregiver demonstrates understanding of disease process, treatment plan, medications, and discharge instructions  Description: Complete learning assessment and assess knowledge base    Interventions:  - Provide teaching at level of understanding  - Provide teaching via preferred learning methods  9/24/2021 1111 by Helen Mohan RN  Outcome: Completed  9/24/2021 0741 by Helen Mohan RN  Outcome: Progressing     Problem: Prexisting or High Potential for Compromised Skin Integrity  Goal: Skin integrity is maintained or improved  Description: INTERVENTIONS:  - Identify patients at risk for skin breakdown  - Assess and monitor skin integrity  - Assess and monitor nutrition and hydration status  - Monitor labs   - Assess for incontinence   - Turn and reposition patient  - Assist with mobility/ambulation  - Relieve pressure over bony prominences  - Avoid friction and shearing  - Provide appropriate hygiene as needed including keeping skin clean and dry  - Evaluate need for skin moisturizer/barrier cream  - Collaborate with interdisciplinary team   - Patient/family teaching  - Consider wound care consult   9/24/2021 1111 by Helen Mohan RN  Outcome: Completed  9/24/2021 0741 by Helen Mohan RN  Outcome: Progressing     Problem: Nutrition/Hydration-ADULT  Goal: Nutrient/Hydration intake appropriate for improving, restoring or maintaining nutritional needs  Description: Monitor and assess patient's nutrition/hydration status for malnutrition  Collaborate with interdisciplinary team and initiate plan and interventions as ordered  Monitor patient's weight and dietary intake as ordered or per policy  Utilize nutrition screening tool and intervene as necessary  Determine patient's food preferences and provide high-protein, high-caloric foods as appropriate       INTERVENTIONS:  - Monitor oral intake, urinary output, labs, and treatment plans  - Assess nutrition and hydration status and recommend course of action  - Evaluate amount of meals eaten  - Assist patient with eating if necessary   - Allow adequate time for meals  - Recommend/ encourage appropriate diets, oral nutritional supplements, and vitamin/mineral supplements  - Order, calculate, and assess calorie counts as needed  - Recommend, monitor, and adjust tube feedings and TPN/PPN based on assessed needs  - Assess need for intravenous fluids  - Provide specific nutrition/hydration education as appropriate  - Include patient/family/caregiver in decisions related to nutrition  9/24/2021 1111 by Justine Farris RN  Outcome: Completed  9/24/2021 0741 by Justine Farris RN  Outcome: Progressing

## 2021-09-24 NOTE — ASSESSMENT & PLAN NOTE
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

## 2021-09-24 NOTE — ASSESSMENT & PLAN NOTE
History of a chronic, partially loculated right pleural effusion with associated atelectasis at the right lung base  He was admitted from 05/26 through 06/09/2021 for this, and transferred to [facility] - treated with chest tube with infusion of tPA  Repeat scan on 05/30 revealed a loculated right hydropneumothorax, after which he underwent a right-sided thoracoscopic decortication 06/02/2021  He was scheduled for thoracentesis via IR on 09/14 - given clinical picture decided to hold off on thoracentesis  CT of the chest shows a large chronic right pleural effusion with probable partial loculations  Will continue to hold off repeat tap and monitor      Outpatient follow-up with Pulmonology

## 2021-09-24 NOTE — PLAN OF CARE
Problem: Potential for Falls  Goal: Patient will remain free of falls  Description: INTERVENTIONS:  - Educate patient/family on patient safety including physical limitations  - Instruct patient to call for assistance with activity   - Consult OT/PT to assist with strengthening/mobility   - Keep Call bell within reach  - Keep bed low and locked with side rails adjusted as appropriate  - Keep care items and personal belongings within reach  - Initiate and maintain comfort rounds  - Make Fall Risk Sign visible to staff  - Offer Toileting every 2 Hours, in advance of need  - Initiate/Maintain bed alarm  - Obtain necessary fall risk management equipment:   - Apply yellow socks and bracelet for high fall risk patients  - Consider moving patient to room near nurses station  Outcome: Progressing     Problem: MOBILITY - ADULT  Goal: Maintain or return to baseline ADL function  Description: INTERVENTIONS:  -  Assess patient's ability to carry out ADLs; assess patient's baseline for ADL function and identify physical deficits which impact ability to perform ADLs (bathing, care of mouth/teeth, toileting, grooming, dressing, etc )  - Assess/evaluate cause of self-care deficits   - Assess range of motion  - Assess patient's mobility; develop plan if impaired  - Assess patient's need for assistive devices and provide as appropriate  - Encourage maximum independence but intervene and supervise when necessary  - Involve family in performance of ADLs  - Assess for home care needs following discharge   - Consider OT consult to assist with ADL evaluation and planning for discharge  - Provide patient education as appropriate  Outcome: Progressing  Goal: Maintains/Returns to pre admission functional level  Description: INTERVENTIONS:  - Perform BMAT or MOVE assessment daily    - Set and communicate daily mobility goal to care team and patient/family/caregiver     - Collaborate with rehabilitation services on mobility goals   - Stand patient ** times a day  - Ambulate patient 2 times a day  - Out of bed to chair 2 times a day   - Out of bed for meals 3 times a day  - Out of bed for toileting  - Record patient progress and toleration of activity level   Outcome: Progressing     Problem: CARDIOVASCULAR - ADULT  Goal: Maintains optimal cardiac output and hemodynamic stability  Description: INTERVENTIONS:  - Monitor I/O, vital signs and rhythm  - Monitor for S/S and trends of decreased cardiac output  - Administer and titrate ordered vasoactive medications to optimize hemodynamic stability  - Assess quality of pulses, skin color and temperature  - Assess for signs of decreased coronary artery perfusion  - Instruct patient to report change in severity of symptoms  Outcome: Progressing  Goal: Absence of cardiac dysrhythmias or at baseline rhythm  Description: INTERVENTIONS:  - Continuous cardiac monitoring, vital signs, obtain 12 lead EKG if ordered  - Administer antiarrhythmic and heart rate control medications as ordered  - Monitor electrolytes and administer replacement therapy as ordered  Outcome: Progressing     Problem: GASTROINTESTINAL - ADULT  Goal: Maintains or returns to baseline bowel function  Description: INTERVENTIONS:  - Assess bowel function  - Encourage oral fluids to ensure adequate hydration  - Administer IV fluids if ordered to ensure adequate hydration  - Administer ordered medications as needed  - Encourage mobilization and activity  - Consider nutritional services referral to assist patient with adequate nutrition and appropriate food choices  Outcome: Progressing  Goal: Maintains adequate nutritional intake  Description: INTERVENTIONS:  - Monitor percentage of each meal consumed  - Identify factors contributing to decreased intake, treat as appropriate  - Assist with meals as needed  - Monitor I&O, weight, and lab values if indicated  - Obtain nutrition services referral as needed  Outcome: Progressing     Problem: GENITOURINARY - ADULT  Goal: Maintains or returns to baseline urinary function  Description: INTERVENTIONS:  - Assess urinary function  - Encourage oral fluids to ensure adequate hydration if ordered  - Administer IV fluids as ordered to ensure adequate hydration  - Administer ordered medications as needed  - Offer frequent toileting  - Follow urinary retention protocol if ordered  Outcome: Progressing     Problem: METABOLIC, FLUID AND ELECTROLYTES - ADULT  Goal: Electrolytes maintained within normal limits  Description: INTERVENTIONS:  - Monitor labs and assess patient for signs and symptoms of electrolyte imbalances  - Administer electrolyte replacement as ordered  - Monitor response to electrolyte replacements, including repeat lab results as appropriate  - Instruct patient on fluid and nutrition as appropriate  Outcome: Progressing  Goal: Fluid balance maintained  Description: INTERVENTIONS:  - Monitor labs   - Monitor I/O and WT  - Instruct patient on fluid and nutrition as appropriate  - Assess for signs & symptoms of volume excess or deficit  Outcome: Progressing  Goal: Glucose maintained within target range  Description: INTERVENTIONS:  - Monitor Blood Glucose as ordered  - Assess for signs and symptoms of hyperglycemia and hypoglycemia  - Administer ordered medications to maintain glucose within target range  - Assess nutritional intake and initiate nutrition service referral as needed  Outcome: Progressing     Problem: SKIN/TISSUE INTEGRITY - ADULT  Goal: Skin Integrity remains intact(Skin Breakdown Prevention)  Description: Assess:  -Perform Danyel assessment every 3  -Clean and moisturize skin   -Inspect skin when repositioning, toileting, and assisting with ADLS  -Assess under medical devices such as  -Assess extremities for adequate circulation and sensation     Bed Management:  -Have minimal linens on bed & keep smooth, unwrinkled  -Change linens as needed when moist or perspiring  -Avoid sitting or lying in one position for more than 2 hours while in bed  -Keep HOB at [address]:  -Offer bedside commode  -Assess for incontinence every shift  -Use incontinent care products after each incontinent episode     Activity:  -Mobilize patient 3 times a day  -Encourage activity and walks on unit  -Encourage or provide ROM exercises   -Turn and reposition patient every 2 Hours  -Use appropriate equipment to lift or move patient in bed  -Instruct/ Assist with weight shifting every 2 when out of bed in chair  -Consider limitation of chair time 2 hour intervals    Skin Care:  -Avoid use of baby powder, tape, friction and shearing, hot water or constrictive clothing  -Relieve pressure over bony prominences   -Do not massage red bony areas    Next Steps:  -Teach patient strategies to minimize risks    -Consider consults to  interdisciplinary teams   Outcome: Progressing     Problem: HEMATOLOGIC - ADULT  Goal: Maintains hematologic stability  Description: INTERVENTIONS  - Assess for signs and symptoms of bleeding or hemorrhage  - Monitor labs  - Administer supportive blood products/factors as ordered and appropriate  Outcome: Progressing     Problem: MUSCULOSKELETAL - ADULT  Goal: Maintain or return mobility to safest level of function  Description: INTERVENTIONS:  - Assess patient's ability to carry out ADLs; assess patient's baseline for ADL function and identify physical deficits which impact ability to perform ADLs (bathing, care of mouth/teeth, toileting, grooming, dressing, etc )  - Assess/evaluate cause of self-care deficits   - Assess range of motion  - Assess patient's mobility  - Assess patient's need for assistive devices and provide as appropriate  - Encourage maximum independence but intervene and supervise when necessary  - Involve family in performance of ADLs  - Assess for home care needs following discharge   - Consider OT consult to assist with ADL evaluation and planning for discharge  - Provide patient education as appropriate  Outcome: Progressing  Goal: Maintain proper alignment of affected body part  Description: INTERVENTIONS:  - Support, maintain and protect limb and body alignment  - Provide patient/ family with appropriate education  Outcome: Progressing     Problem: PAIN - ADULT  Goal: Verbalizes/displays adequate comfort level or baseline comfort level  Description: Interventions:  - Encourage patient to monitor pain and request assistance  - Assess pain using appropriate pain scale  - Administer analgesics based on type and severity of pain and evaluate response  - Implement non-pharmacological measures as appropriate and evaluate response  - Consider cultural and social influences on pain and pain management  - Notify physician/advanced practitioner if interventions unsuccessful or patient reports new pain  Outcome: Progressing     Problem: INFECTION - ADULT  Goal: Absence or prevention of progression during hospitalization  Description: INTERVENTIONS:  - Assess and monitor for signs and symptoms of infection  - Monitor lab/diagnostic results  - Monitor all insertion sites, i e  indwelling lines, tubes, and drains  - Monitor endotracheal if appropriate and nasal secretions for changes in amount and color  - Elizabeth appropriate cooling/warming therapies per order  - Administer medications as ordered  - Instruct and encourage patient and family to use good hand hygiene technique  - Identify and instruct in appropriate isolation precautions for identified infection/condition  Outcome: Progressing     Problem: SAFETY ADULT  Goal: Patient will remain free of falls  Description: INTERVENTIONS:  - Educate patient/family on patient safety including physical limitations  - Instruct patient to call for assistance with activity   - Consult OT/PT to assist with strengthening/mobility   - Keep Call bell within reach  - Keep bed low and locked with side rails adjusted as appropriate  - Keep care items and personal belongings within reach  - Initiate and maintain comfort rounds  - Make Fall Risk Sign visible to staff  - Offer Toileting every 2 Hours, in advance of need  - Initiate/Maintain bed alarm  - Obtain necessary fall risk management equipment:   - Apply yellow socks and bracelet for high fall risk patients  - Consider moving patient to room near nurses station  Outcome: Progressing  Goal: Maintain or return to baseline ADL function  Description: INTERVENTIONS:  -  Assess patient's ability to carry out ADLs; assess patient's baseline for ADL function and identify physical deficits which impact ability to perform ADLs (bathing, care of mouth/teeth, toileting, grooming, dressing, etc )  - Assess/evaluate cause of self-care deficits   - Assess range of motion  - Assess patient's mobility; develop plan if impaired  - Assess patient's need for assistive devices and provide as appropriate  - Encourage maximum independence but intervene and supervise when necessary  - Involve family in performance of ADLs  - Assess for home care needs following discharge   - Consider OT consult to assist with ADL evaluation and planning for discharge  - Provide patient education as appropriate  Outcome: Progressing  Goal: Maintains/Returns to pre admission functional level  Description: INTERVENTIONS:  - Perform BMAT or MOVE assessment daily    - Set and communicate daily mobility goal to care team and patient/family/caregiver     - Collaborate with rehabilitation services on mobility goals if consulted  - Stand patient 2 times a day  - Ambulate patient 2 times a day  - Out of bed to chair 3 times a day   - Out of bed for meals 3 times a day  - Out of bed for toileting  - Record patient progress and toleration of activity level   Outcome: Progressing     Problem: DISCHARGE PLANNING  Goal: Discharge to home or other facility with appropriate resources  Description: INTERVENTIONS:  - Identify barriers to discharge w/patient and dietary intake as ordered or per policy  Utilize nutrition screening tool and intervene as necessary  Determine patient's food preferences and provide high-protein, high-caloric foods as appropriate       INTERVENTIONS:  - Monitor oral intake, urinary output, labs, and treatment plans  - Assess nutrition and hydration status and recommend course of action  - Evaluate amount of meals eaten  - Assist patient with eating if necessary   - Allow adequate time for meals  - Recommend/ encourage appropriate diets, oral nutritional supplements, and vitamin/mineral supplements  - Order, calculate, and assess calorie counts as needed  - Recommend, monitor, and adjust tube feedings and TPN/PPN based on assessed needs  - Assess need for intravenous fluids  - Provide specific nutrition/hydration education as appropriate  - Include patient/family/caregiver in decisions related to nutrition  Outcome: Progressing

## 2021-09-24 NOTE — ASSESSMENT & PLAN NOTE
Known past medical history secondary to alcoholic and fatty liver disease  · Follows outpatient with Gastroenterology; last seen on 08/04/2021  · Not a candidate for liver transplant given BMI and comorbidities  · Avoid hepatotoxic medications    · Follow the liver function tests as an outpatient with his PCP

## 2021-09-24 NOTE — DISCHARGE INSTRUCTIONS
Sepsis   WHAT YOU NEED TO KNOW:   What is sepsis? Sepsis happens when an infection spreads and causes your body to react strongly to germs  Your body's defense system normally releases chemicals to fight off infection at the infected area  When infection spreads, chemicals are released throughout your body  The chemicals cause inflammation and clotting in small blood vessels that is difficult to control  Inflammation and clotting decrease blood flow and oxygen to your organs  This may cause your organs to stop working correctly  Sepsis is a life-threatening emergency  What increases my risk for sepsis? · An infection anywhere in your body, especially your blood or urinary tract    · Treatment in a hospital for a serious illness, or having an implanted catheter    · Age older than 72    · Chronic conditions such as COPD, heart failure, or diabetes    · A weak immune system caused by medicines or a condition such as cancer    · Severe injuries, such as large burns    · A recent surgery    What are the signs and symptoms of sepsis? · Fast breathing    · Confusion, loss of consciousness, or a seizure    · Fever or very low body temperature    · Low blood pressure    · Severe pain    · Chills or severe shaking    · Cold, pale, or clammy skin    · Extreme weakness    · Fast or irregular heartbeat    · Urinating very little or not at all    How is sepsis diagnosed? · Sequential/Sepsis-related Organ Failure Assessment (SOFA)  is used by healthcare providers to check respiratory, liver, and kidney function, blood clotting, blood levels, and consciousness  The SOFA os given as a score from 0 to 4     · Quick SOFA (qSOFA)  is used to check 3 items in an emergency  Sepsis treatment will begin if you have at least 2 of the following:    ? A systolic blood pressure of 100 mmHg or less    ? 22 breaths a minute or more    ? A change in the level of consciousness    How is sepsis treated?   Several treatments may be needed if sepsis causes one or more organs to stop working correctly  Treatments are often started in the emergency room and continued in an intensive care or critical care unit of a hospital  You may need any of the following:  · Medicines  will be given to treat the infection  Medicines may be given to increase your blood pressure and blood flow to your organs  Medicines may also be given to control your blood sugar level, or to prevent stomach ulcers or blood clots  · Oxygen  may be needed if your blood oxygen level is lower than it should be  · A ventilator  is a machine that gives you oxygen and breathes for you when you cannot breathe well on your own  An endotracheal (ET) tube is put into your mouth or nose and attached to the ventilator  · A blood transfusion  may be needed if bleeding occurs or platelet levels drop  This can happen in severe sepsis  · Removal or change of a catheter or drain  may be needed to get rid of the infection  · Dialysis  may be needed if your kidneys stop working correctly or are damaged during sepsis  Dialysis is a procedure to remove chemicals, wastes, and extra fluid from your blood  · Surgery or other procedures  may be needed to treat problems causing sepsis  This may include draining an abscess or removing infected tissue  What can I do to prevent sepsis? · Care for wounds and incisions as directed  Keep wounds and incisions clean and dry  Change your bandages when they get wet or dirty  Tell your healthcare provider immediately if you have signs of a wound infection  Signs include redness, warmth, swelling, or pus  · Care for your drain or IV catheter as directed  Ask your healthcare provider how to care for your tube or IV catheter  Correct care of these devices can help prevent infection  · Wash your hands often  This can help decrease your risk for infections   Wash your hands before you eat or prepare a meal  Also wash your hands after you use the bathroom  Use soap and water or an alcohol-based hand rub  · Ask your healthcare provider about vaccines  Vaccines can help prevent some infections that may lead to sepsis  Get a flu vaccine every year  CARE AGREEMENT:   You have the right to help plan your care  Learn about your health condition and how it may be treated  Discuss treatment options with your healthcare providers to decide what care you want to receive  You always have the right to refuse treatment  The above information is an  only  It is not intended as medical advice for individual conditions or treatments  Talk to your doctor, nurse or pharmacist before following any medical regimen to see if it is safe and effective for you  © Copyright [employer] 2021 Information is for End User's use only and may not be sold, redistributed or otherwise used for commercial purposes  All illustrations and images included in CareNotes® are the copyrighted property of [employer]  or Mariama Radford       Sepsis   WHAT YOU NEED TO KNOW:   Sepsis happens when an infection spreads and causes your body to react strongly to germs  Your body's defense system normally releases chemicals to fight off infection at the infected area  When infection spreads, chemicals are released throughout your body  The chemicals cause inflammation and clotting in small blood vessels that is difficult to control  Inflammation and clotting decrease blood flow and oxygen to your organs  This may cause your organs to stop working correctly  Sepsis is a life-threatening emergency  DISCHARGE INSTRUCTIONS:   Call your local emergency number (912 in the [address] [place] [address],[address]) if:   · You are short of breath or you cough up blood  · You have a fast heart rate and your chest hurts  · You feel so dizzy that you have trouble standing up  Seek care immediately if:   · You have increased swelling in your legs, feet, or abdomen      · Your lips or fingernails are blue     Call your doctor if:   · You have a fever  · You have muscle or joint pain  · You begin to have trouble sleeping, nightmares, or panic attacks  · You have questions or concerns about your condition or care  Medicines:   · Medicines  help treat an infection or decrease your symptoms  · Take your medicine as directed  Contact your healthcare provider if you think your medicine is not helping or if you have side effects  Tell him of her if you are allergic to any medicine  Keep a list of the medicines, vitamins, and herbs you take  Include the amounts, and when and why you take them  Bring the list or the pill bottles to follow-up visits  Carry your medicine list with you in case of an emergency  Prevent infection:   · Wash your hands often  Use soap and water  Wash your hands after you use the bathroom, change a child's diapers, or sneeze  Do not touch your eyes, nose, or mouth unless you have washed your hands first  Wash your hands before you prepare or eat food  Carry hand  with you  You can use it to clean your hands when soap and water are not available  · Ask about vaccines  Vaccines can decrease your risk for certain infections, such as the flu or pneumonia  · Prevent the spread of germs  Try to stay away from people who have a cold or the flu  If you are sick, stay away from others as much as possible  Follow up with your doctor as directed:  Write down your questions so you remember to ask them during your visits  © Copyright [employer] 2021 Information is for End User's use only and may not be sold, redistributed or otherwise used for commercial purposes  All illustrations and images included in CareNotes® are the copyrighted property of [employer]  or Mariama Melendez  The above information is an  only  It is not intended as medical advice for individual conditions or treatments   Talk to your doctor, nurse or pharmacist before following any medical regimen to see if it is safe and effective for you  Heart Failure   WHAT YOU NEED TO KNOW:   Heart failure is a condition that does not allow your heart to fill or pump properly  Not enough oxygen in your blood gets to your organs and tissues  Fluid may not move through your body properly  Fluid builds up and causes swelling and trouble breathing  This is known as congestive heart failure  Heart failure may start in the left or right ventricle  Heart failure is often caused by damage or injury to your heart  The damage may be caused by other heart problems, diabetes, or high blood pressure  The damage may have also been caused by an infection  Heart failure is a long-term condition that tends to get worse over time  It is important to manage your health to improve your quality of life  DISCHARGE INSTRUCTIONS:   Call your local emergency number (911 in the [address] [facility],[address]) if:   · You have any of the following signs of a heart attack:      ? Squeezing, pressure, or pain in your chest    ? You may  also have any of the following:     § Discomfort or pain in your back, neck, jaw, stomach, or arm    § Shortness of breath    § Nausea or vomiting    § Lightheadedness or a sudden cold sweat      Call your doctor if:   · Your heartbeat is fast, slow, or uneven all the time  · You have symptoms of worsening heart failure:      ? Shortness of breath at rest, at night, or that is getting worse in any way    ? Weight gain of 3 or more pounds (1 4 kg) in a day, or more than your healthcare provider says is okay    ? More swelling in your legs or ankles    ? Abdominal pain or swelling    ? More coughing    ? Loss of appetite    ? Feeling tired all the time    · You feel hopeless or depressed, or you have lost interest in things you used to enjoy  · You often feel worried or afraid  · You have questions or concerns about your condition or care      Medicines:   · Medicines  may be given to help regulate your heart rhythm and lower your blood pressure  You may also need medicines to help decrease extra fluids  Medicines, such as NSAIDs, may be stopped if they are causing your heart failure to become worse  Do not stop any of your medicines on your own  · Take your medicine as directed  Contact your healthcare provider if you think your medicine is not helping or if you have side effects  Tell him or her if you are allergic to any medicine  Keep a list of the medicines, vitamins, and herbs you take  Include the amounts, and when and why you take them  Bring the list or the pill bottles to follow-up visits  Carry your medicine list with you in case of an emergency  Go to cardiac rehab if directed:  Cardiac rehab is a program run by specialists who will help you safely strengthen your heart  In the program you will learn about exercise, relaxation, stress management, and heart-healthy nutrition  Manage swelling from extra fluid:   · Elevate (raise) your legs above the level of your heart  This will help with fluid that builds up in your legs or ankles  Elevate your legs as often as possible during the day  Prop your legs on pillows or blankets to keep them elevated comfortably  Try not to stand for long periods of time during the day  Move around to keep your blood circulating  · Limit sodium (salt)  Ask how much sodium you can have each day  Your healthcare provider may give you a limit, such as 2,300 milligrams (mg) a day  Your provider or a dietitian can teach you how to read food labels for the number of mg in a food  He or she can also help you find ways to have less salt  For example, if you add salt to food as you cook, do not add more at the table  · Drink liquids as directed  You may need to limit the amount of liquid you drink within 24 hours  Your healthcare provider will tell you how much liquid to have and which liquids are best for you   He or she may tell you to limit liquid to 1 5 to 2 liters in a day  He or she will also tell you how often to drink liquid throughout the day  · Weigh yourself every morning  Use the same scale, in the same spot  Do this after you use the bathroom, but before you eat or drink  Wear the same type of clothing each time  Write down your weight and call your healthcare provider if you have a sudden weight gain  Swelling and weight gain are signs of fluid buildup  Manage heart failure: Your quality of life may improve with treatment and the following:  · Do not smoke  Nicotine and other chemicals in cigarettes and cigars can cause lung and heart damage  Ask your healthcare provider for information if you currently smoke and need help to quit  E-cigarettes or smokeless tobacco still contain nicotine  Talk to your healthcare provider before you use these products  · Do not drink alcohol or use illegal drugs  Alcohol and drugs can increase your risk for high blood pressure, diabetes, and coronary artery disease  · Eat heart-healthy foods  Heart-healthy foods include fruits, vegetables, lean meat (such as beef, chicken, or pork), and low-fat dairy products  Fatty fish such as salmon and tuna are also heart healthy  Other heart-healthy foods include walnuts, whole-grain breads, beans, and cooked beans  Replace butter and margarine with heart-healthy oils such as olive oil or canola oil  Your provider or a dietitian can help you create heart-healthy meal plans  · Manage any chronic health conditions you have  These include high blood pressure, diabetes, obesity, high cholesterol, metabolic syndrome, and COPD  You will have fewer symptoms if you manage these health conditions  Follow your healthcare provider's recommendations and follow up with him or her regularly  · Maintain a healthy weight  Being overweight can increase your risk for high blood pressure, diabetes, and coronary artery disease   These conditions can make your symptoms worse  Ask your healthcare provider how much you should weigh  Ask him or her to help you create a weight loss plan if you are overweight  · Stay active  Activity can help keep your symptoms from getting worse  Walking is a type of physical activity that helps maintain your strength and improve your mood  Physical activity also helps you manage your weight  Work with your healthcare provider to create an exercise plan that is right for you  · Get vaccines as directed  The flu and pneumonia can be severe for a person who has heart failure  Vaccines protect you from these infections  Get a flu shot every year as soon as it is recommended, usually in September or October  You may also need the pneumonia vaccine  Your healthcare provider can tell you if you need other vaccines, and when to get them  Follow up with your doctor within 7 days and as directed: You may need to return for other tests  You may need home health care  A healthcare provider will monitor your vital signs, weight, and make sure your medicines are working  Write down your questions so you remember to ask them during your visits  Join a support group:  Heart failure can be difficult to manage  It may be helpful to talk with others who have heart failure  You may learn how to better manage your condition or get emotional support  For more information:  · [address]  [place] , [place]   Phone: [phone]  Web Address: https://www strong [employer]/  [facility]  2021 Information is for End User's use only and may not be sold, redistributed or otherwise used for commercial purposes  All illustrations and images included in CareNotes® are the copyrighted property of A D A M , Inc  or [facility] Rosalind Radford   The above information is an  only  It is not intended as medical advice for individual conditions or treatments   Talk to your doctor, nurse or pharmacist before following any medical regimen to see if it is safe and effective for you  Heart Failure   WHAT YOU NEED TO KNOW:   What is heart failure? Heart failure is a condition that does not allow your heart to fill or pump properly  Your heart cannot pump enough oxygen in your blood to your organs and tissues  Fluid may not move through your body properly  Fluid builds up and causes swelling and trouble breathing  This is known as congestive heart failure  Heart failure may start in the left or right ventricle  Heart failure is a long-term condition that tends to get worse over time  It is important to manage your health to improve your quality of life  What are the signs and symptoms of heart failure? The signs and symptoms depend on how severe your heart failure is  You may have any of the following:  · Trouble breathing with activity that worsens to trouble breathing at rest    · Shortness of breath while lying flat    · Severe shortness of breath and coughing at night that usually wakes you    · Feeling lightheaded when you stand up    · Purple color around your mouth and nails    · Confusion or anxiety    · Chest pain at night    · Periods of no breathing, then breathing fast    · Lack of energy (often worsened by physical activity), or trouble sleeping    · Swelling in your ankles, legs, or abdomen    · Heartbeat that is fast or not regular    · Fingers and toes feel cool to the touch    How is heart failure diagnosed? Tell your healthcare provider about your health history and the medicines you take  He or she will ask about your shortness of breath and other symptoms  You may need any of the following:  · Blood tests  are used to check for heart problems such as coronary artery disease or decreased blood flow  Blood tests also give healthcare providers information about your kidney, liver, and thyroid function  The results can also show an infection      · An EKG  test records your heart rhythm and how fast your heart beats  It shows healthcare providers if you have heart block or have had a heart attack  · Echocardiogram  is a type of ultrasound  Sound waves are used to show the structure and function of your heart  This test may show if there are problems with your heart valves  It may also show if the chambers of your heart are working properly  · X-ray, CT, or MRI  pictures may be taken of your heart and lungs  The pictures may show the cause of your heart failure, or blood clots or fluid in your lungs  You may be given contrast liquid to help your heart show up better in the pictures  Tell the healthcare provider if you have ever had an allergic reaction to contrast liquid  Do not enter the MRI room with anything metal  Metal can cause serious injury  Tell the provider if you have any metal in or on your body  How is heart failure treated? Your healthcare providers will help you manage any other health conditions that may be causing your heart failure  The goals of treatment are to manage, slow, or reverse heart damage  Treatment may include any of the following:  · Medicines  may be given to help regulate your heart rhythm and lower your blood pressure  You may also need medicines to help decrease extra fluids  Medicines, such as NSAIDs, may be stopped if they are causing your heart failure to become worse  Do not stop any of your medicines on your own  · Cardiac rehab  is a program run by specialists who will help you safely strengthen your heart  In the program you will learn about exercise, relaxation, stress management, and heart-healthy nutrition  Cardiac rehab may be recommended if your heart failure is not severe  · Oxygen  may help you breathe easier if your oxygen level is lower than normal  A CPAP machine may be used to keep your airway open while you sleep      · Surgery  can be done to implant a pacemaker or another device in your chest to regulate your heart rhythm  Other types of surgery can open blocked heart vessels, replace a damaged heart valve, or remove scar tissue  What can I do to manage swelling from extra fluid? · Elevate (raise) your legs above the level of your heart  This will help with fluid that builds up in your legs or ankles  Elevate your legs as often as possible during the day  Prop your legs on pillows or blankets to keep them elevated comfortably  Try not to stand for long periods of time during the day  Move around to keep your blood circulating  · Limit sodium (salt)  Ask how much sodium you can have each day  Your healthcare provider may give you a limit, such as 2,300 milligrams (mg) a day  Your provider or a dietitian can teach you how to read food labels for the number of mg in a food  He or she can also help you find ways to have less salt  For example, if you add salt to food as you cook, do not add more at the table  · Drink liquids as directed  You may need to limit the amount of liquid you drink within 24 hours  Your healthcare provider will tell you how much liquid to have and which liquids are best for you  He or she may tell you to limit liquid to 1 5 to 2 liters in a day  He or she will also tell you how often to drink liquid throughout the day  · Weigh yourself every morning  Use the same scale, in the same spot  Do this after you use the bathroom, but before you eat or drink  Wear the same type of clothing each time  Write down your weight and call your healthcare provider if you have a sudden weight gain  Swelling and weight gain are signs of fluid buildup  What can I do to manage heart failure? Your quality of life may improve with treatment and the following:  · Do not smoke  Nicotine and other chemicals in cigarettes and cigars can cause lung and heart damage  Ask your healthcare provider for information if you currently smoke and need help to quit   E-cigarettes or smokeless tobacco still contain nicotine  Talk to your healthcare provider before you use these products  · Do not drink alcohol or use illegal drugs  Alcohol and drugs can increase your risk for high blood pressure, diabetes, and coronary artery disease  · Eat heart-healthy foods  Heart-healthy foods include fruits, vegetables, lean meat (such as beef, chicken, or pork), and low-fat dairy products  Fatty fish such as salmon and tuna are also heart healthy  Other heart-healthy foods include walnuts, whole-grain breads, beans, and cooked beans  Replace butter and margarine with heart-healthy oils such as olive oil or canola oil  Your provider or a dietitian can help you create heart-healthy meal plans  · Manage any chronic health conditions you have  These include high blood pressure, diabetes, obesity, high cholesterol, metabolic syndrome, and COPD  You will have fewer symptoms if you manage these health conditions  Follow your healthcare provider's recommendations and follow up with him or her regularly  · Maintain a healthy weight  Being overweight can increase your risk for high blood pressure, diabetes, and coronary artery disease  These conditions can make your symptoms worse  Ask your healthcare provider how much you should weigh  Ask him or her to help you create a weight loss plan if you are overweight  · Stay active  Activity can help keep your symptoms from getting worse  Walking is a type of physical activity that helps maintain your strength and improve your mood  Physical activity also helps you manage your weight  Work with your healthcare provider to create an exercise plan that is right for you  · Get vaccines as directed  The flu and pneumonia can be severe for a person who has heart failure  Vaccines protect you from these infections  Get a flu shot every year as soon as it is recommended, usually in September or October  You may also need the pneumonia vaccine   Your healthcare provider can tell you if you need other vaccines, and when to get them  Call your local emergency number [phone] in the [address]) if:   · You have any of the following signs of a heart attack:      ? Squeezing, pressure, or pain in your chest    ? You may  also have any of the following:     § Discomfort or pain in your back, neck, jaw, stomach, or arm    § Shortness of breath    § Nausea or vomiting    § Lightheadedness or a sudden cold sweat      When should I call my doctor? · Your heartbeat is fast, slow, or uneven all the time  · You have symptoms of worsening heart failure:      ? Shortness of breath at rest, at night, or that is getting worse in any way    ? Weight gain of 3 or more pounds (1 4 kg) in a day, or more than your healthcare provider says is okay    ? More swelling in your legs or ankles    ? Abdominal pain or swelling    ? More coughing    ? Feeling tired all the time    · You feel hopeless or depressed, or you have lost interest in things you used to enjoy  · You often feel worried or afraid  · You have questions or concerns about your condition or care  CARE AGREEMENT:   You have the right to help plan your care  Learn about your health condition and how it may be treated  Discuss treatment options with your healthcare providers to decide what care you want to receive  You always have the right to refuse treatment  The above information is an  only  It is not intended as medical advice for individual conditions or treatments  Talk to your doctor, nurse or pharmacist before following any medical regimen to see if it is safe and effective for you  © Copyright [employer] 2021 Information is for End User's use only and may not be sold, redistributed or otherwise used for commercial purposes   All illustrations and images included in CareNotes® are the copyrighted property of A D A M , Inc  or Mariama Melendez      Chronic Kidney Disease   WHAT YOU NEED TO KNOW:   Chronic kidney disease (CKD) is the gradual and permanent loss of kidney function  It is also called chronic kidney failure, or chronic renal insufficiency  Normally, the kidneys remove fluid, chemicals, and waste from your blood  These wastes are turned into urine by your kidneys  CKD may worsen over time and lead to kidney failure  Your CKD team will help you and your family plan for your care at home  The team will help you create goals and find ways to meet your goals  Your care plan may change over time as your needs change  DISCHARGE INSTRUCTIONS:   Call your local emergency number (911 in the [address]) if:   · You have a seizure  · You have shortness of breath  Seek care immediately if:   · You are confused and very drowsy  Call your doctor or nephrologist if:   · You suddenly gain or lose more weight than your healthcare provider has told you is okay  · You have itchy skin or a rash  · You urinate more or less than you normally do  · You have blood in your urine  · You have nausea and are vomiting  · You have fatigue or muscle weakness  · You have hiccups that will not stop  · You have questions or concerns about your condition or care  Medicines:   · Medicines  may be given to decrease blood pressure and get rid of extra fluid  You may also receive medicine to manage health conditions that may occur with CKD, such as anemia, diabetes, and heart disease  · Take your medicine as directed  Contact your healthcare provider if you think your medicine is not helping or if you have side effects  Tell him or her if you are allergic to any medicine  Keep a list of the medicines, vitamins, and herbs you take  Include the amounts, and when and why you take them  Bring the list or the pill bottles to follow-up visits  Carry your medicine list with you in case of an emergency  What you can do to manage CKD: Management may include making some lifestyle changes   Tell your healthcare provider if you have any concerns about being able to make changes  He or she can help you find solutions, including working with specialists  Ask for help creating a plan to break large goals into smaller steps  Your plan may include any of the following:  · Manage other health conditions  Your healthcare provider will work with you to make a care plan that meets your needs  You will be checked regularly for heart disease or other conditions that can make CKD worse, such as diabetes  Your blood pressure will be closely monitored  You will also get a target blood pressure and help making a plan to reach your target  This may include taking your blood pressure at home  · Maintain a healthy weight  Your weight and body mass index (BMI) will be checked regularly  BMI helps find if your weight is healthy for your height  Your healthcare provider will use other tests to check your muscle and protein levels  Extra weight can strain your kidneys  A low weight or low muscle mass can make you feel more tired  You may have trouble doing your daily activities  Ask your provider what a healthy weight is for you  He or she can help you create a plan to lose or gain weight safely, if needed  The plan may include keeping a food diary  This is a list of foods and liquids you have each day  Your provider will use the diary to help you make changes, if needed  Changes are based on your health and any other conditions you have, such as diabetes  · Create an exercise plan  Regular exercise can help you manage CKD, high blood pressure, and diabetes  Exercise also helps control weight  Your provider can help you create exercise goals and a plan to reach those goals  For example, your goal may be to exercise for 30 minutes in a day  Your plan can include breaking exercise into 10 minute sessions, 3 times during the day  · Create a healthy eating plan    Your provider may tell you to eat food low in potassium, phosphorus, or protein  Your provider may also recommend vitamin or mineral supplements  Do not take any supplements without talking to your provider  A dietitian can help you plan meals if needed  Ask how much liquid to drink each day and which liquids are best for you  · Limit sodium (salt) as directed  You may need to limit sodium to less than 2,300 milligrams (mg) each day  Ask your dietitian or healthcare provider how much sodium you can have each day  The amount depends on your stage of kidney disease  Table salt, canned foods, soups, salted snacks, and processed meats, like deli meats and sausage, are high in sodium  Your provider or a dietitian can show you how to read food labels for sodium  · Limit alcohol as directed  Alcohol can cause fluid retention and can affect your kidneys  Ask how much alcohol is safe for you  A drink of alcohol is 12 ounces of beer, 5 ounces of wine, or 1½ ounces of liquor  · Do not smoke  Nicotine and other chemicals in cigarettes and cigars can cause kidney damage  Ask your provider for information if you currently smoke and need help to quit  E-cigarettes or smokeless tobacco still contain nicotine  Talk to your provider before you use these products  · Ask about over-the-counter medicines  Medicines such as NSAIDs and laxatives may harm your kidneys  Some cough and cold medicines can raise your blood pressure  Always ask if a medicine is safe before you take it  · Ask about vaccines you may need  CKD can increase your risk for infections such as pneumonia, influenza, and hepatitis  Vaccines lower your risk for infection  Your healthcare provider will tell you which vaccines you need and when to get them  Follow up with your doctor or nephrologist as directed: You will need to return for tests to monitor your kidney and nerve function, and your parathyroid hormone level  Your medicines may be changed, based on certain test results   Write down your questions so you remember to ask them during your visits  © Copyright [employer] 2021 Information is for End User's use only and may not be sold, redistributed or otherwise used for commercial purposes  All illustrations and images included in CareNotes® are the copyrighted property of A D A M , Inc  or Mariama Melendez  The above information is an  only  It is not intended as medical advice for individual conditions or treatments  Talk to your doctor, nurse or pharmacist before following any medical regimen to see if it is safe and effective for you  Chronic Kidney Disease   WHAT YOU NEED TO KNOW:   What is chronic kidney disease (CKD)? CKD is the gradual and permanent loss of kidney function  It is also called chronic kidney failure, or chronic renal insufficiency  Normally, the kidneys remove fluid, chemicals, and waste from your blood  These wastes are turned into urine by your kidneys  CKD may worsen over time and lead to kidney failure  What increases my risk for CKD? · Diabetes or obesity    · High blood pressure or heart disease    · Kidney infections or kidney stones    · Autoimmune diseases, such as lupus    · An enlarged prostate    · NSAIDs, illegal drugs, or smoking    · Family history of kidney disease    What are the signs and symptoms of CKD? Signs and symptoms depend on how well your kidneys work  You may not have symptoms, or you may have any of the following:  · Changes in how often you need to urinate    · Swelling in your arms, legs, or feet    · Shortness of breath    · Fatigue or weakness    · Bad or bitter taste in your mouth    · Nausea, vomiting, or loss of appetite    How is CKD diagnosed? CKD has 5 stages  Your healthcare provider will use results from the following tests to find the stage of CKD you have:  · Blood and urine tests  show how well your kidneys are working  They may also show the cause of your CKD      · Ultrasound, CT scan, or MRI  pictures may be used to check your kidneys  You may be given contrast liquid to help your kidneys show up better in the pictures  Tell the healthcare provider if you have ever had an allergic reaction to contrast liquid  Do not enter the MRI room with anything metal  Metal can cause serious injury  Tell the healthcare provider if you have any metal in or on your body  · A biopsy  is a procedure to take tissue from your kidney  It is done to find the cause of your CKD  How is CKD treated? Treatment can help control signs and symptoms, and prevent a worse stage of CKD  Your care team may include specialists, such as a dietitian or a heart specialist  This depends on the stage of your CKD and if you have other health conditions to manage  Healthcare providers will work with you to create a plan based on your decisions for treatment  Your treatment plan may include any of the following:  · Medicines  may be given to decrease your blood pressure and get rid of extra fluid  You may also receive medicine to manage health conditions that may occur with CKD, such as anemia, diabetes, and heart disease  · Dialysis  is a treatment to remove chemicals and waste from your blood when your kidneys can no longer do this  · Surgery  may be needed to create an arteriovenous fistula (AVF) in your arm or insert a catheter into your abdomen  This is done so you can receive dialysis  · A kidney transplant  may be done if your CKD becomes severe  What can I do to manage CKD? Management may include making some lifestyle changes  Tell your healthcare provider if you have any concerns about being able to make changes  He or she can help you find solutions, including working with specialists  Ask for help creating a plan to break large goals into smaller steps  Your plan may include any of the following:  · Manage other health conditions  Your healthcare provider will work with you to make a care plan that meets your needs   You will be checked regularly for heart disease or other conditions that can make CKD worse, such as diabetes  Your blood pressure will be closely monitored  You will also get a target blood pressure and help making a plan to reach your target  This may include taking your blood pressure at home  · Maintain a healthy weight  Your weight and body mass index (BMI) will be checked regularly  BMI helps find if your weight is healthy for your height  Your healthcare provider will use other tests to check your muscle and protein levels  Extra weight can strain your kidneys  A low weight or low muscle mass can make you feel more tired  You may have trouble doing your daily activities  Ask your provider what a healthy weight is for you  He or she can help you create a plan to lose or gain weight safely, if needed  The plan may include keeping a food diary  This is a list of foods and liquids you have each day  Your provider will use the diary to help you make changes, if needed  Changes are based on your health and any other conditions you have, such as diabetes  · Create an exercise plan  Regular exercise can help you manage CKD, high blood pressure, and diabetes  Exercise also helps control weight  Your provider can help you create exercise goals and a plan to reach those goals  For example, your goal may be to exercise for 30 minutes in a day  Your plan can include breaking exercise into 10 minute sessions, 3 times during the day  · Create a healthy eating plan  Your provider may tell you to eat food low in potassium, phosphorus, or protein  Your provider may also recommend vitamin or mineral supplements  Do not take any supplements without talking to your provider  A dietitian can help you plan meals if needed  Ask how much liquid to drink each day and which liquids are best for you  · Limit sodium (salt) as directed  You may need to limit sodium to less than 2,300 milligrams (mg) each day   Ask your dietitian or healthcare provider how much sodium you can have each day  The amount depends on your stage of kidney disease  Table salt, canned foods, soups, salted snacks, and processed meats, like deli meats and sausage, are high in sodium  Your provider or a dietitian can show you how to read food labels for sodium  · Limit alcohol as directed  Alcohol can cause fluid retention and can affect your kidneys  Ask how much alcohol is safe for you  A drink of alcohol is 12 ounces of beer, 5 ounces of wine, or 1½ ounces of liquor  · Do not smoke  Nicotine and other chemicals in cigarettes and cigars can cause kidney damage  Ask your provider for information if you currently smoke and need help to quit  E-cigarettes or smokeless tobacco still contain nicotine  Talk to your provider before you use these products  · Ask about over-the-counter medicines  Medicines such as NSAIDs and laxatives may harm your kidneys  Some cough and cold medicines can raise your blood pressure  Always ask if a medicine is safe before you take it  · Ask about vaccines you may need  CKD can increase your risk for infections such as pneumonia, influenza, and hepatitis  Vaccines lower your risk for infection  Your healthcare provider will tell you which vaccines you need and when to get them  Call your local emergency number (911 in the [address] [facility]) if:   · You have a seizure  · You have shortness of breath  When should I seek immediate care? · You are confused and very drowsy  When should I call my doctor? · You suddenly gain or lose more weight than your healthcare provider has told you is okay  · You have itchy skin or a rash  · You urinate more or less than you normally do  · You have blood in your urine  · You have nausea and are vomiting  · You have fatigue or muscle weakness  · You have hiccups that will not stop  · You have questions or concerns about your condition or care      CARE AGREEMENT:   You have the right to help plan your care  Learn about your health condition and how it may be treated  Discuss treatment options with your healthcare providers to decide what care you want to receive  You always have the right to refuse treatment  The above information is an  only  It is not intended as medical advice for individual conditions or treatments  Talk to your doctor, nurse or pharmacist before following any medical regimen to see if it is safe and effective for you  © Copyright [address] 2021 Information is for End User's use only and may not be sold, redistributed or otherwise used for commercial purposes  All illustrations and images included in CareNotes® are the copyrighted property of A D A M , Inc  or [address]                  Cigarette Smoking and [address] [facility]:   What are the risks to my health if I smoke tobacco?  Nicotine and other chemicals found in tobacco and e-cigarettes can damage every cell in your body  Even if you are a light smoker, you have an increased risk for cancer, heart disease, and lung disease  If you are pregnant or have diabetes, smoking increases your risk for complications  Nicotine can affect an adolescent's developing brain  This can lead to trouble thinking, learning, or paying attention  What are the benefits to my health if I stop smoking? · You decrease respiratory symptoms such as coughing, wheezing, and shortness of breath  · You reduce your risk for cancers of the lung, mouth, throat, kidney, bladder, pancreas, stomach, and cervix  If you already have cancer, you increase the benefits of chemotherapy  You also reduce your risk for cancer returning or a second cancer from developing  · You reduce your risk for heart disease, blood clots, heart attack, and stroke  · You reduce your risk for lung infections, and diseases such as pneumonia, asthma, chronic bronchitis, and emphysema      · Your circulation improves  More oxygen can be delivered to your body  If you have diabetes, you lower your risk for complications, such as kidney, artery, and eye diseases  You also lower your risk for nerve damage  Nerve damage can lead to amputations, poor vision, and blindness  · You improve your body's ability to heal and to fight infections  · An adolescent can help his or her brain and body develop in a healthy way  Talk to your adolescent about all the health risks of nicotine  If you can, start talking about nicotine when your child is younger than 12 years  This may make it easier for him or her not to start using nicotine as a teenager or adult  Explain to him or her that it is best never to start  It can be hard to try to quit later  What are the health benefits to others if I stop smoking? Tobacco is harmful to nonsmokers who breathe in your secondhand smoke  The following are ways the health of others around you may improve when you stop smoking:  · You lower the risks for lung cancer and heart disease in nonsmoking adults  · If you are pregnant, you lower the risk for miscarriage, early delivery, low birth weight, and stillbirth  You also lower your baby's risk for SIDS, obesity, developmental delay, and neurobehavioral problems, such as ADHD  · If you have children, you lower their risk for ear infections, colds, pneumonia, bronchitis, and asthma  Where can I find support and more information? · American Lung Association  [address] [place] [address],[address]  [address]  Phone: [facility] [address]  Phone: [phone] - [phone]  Web Address: [employer]    · Smokefree  gov  Phone: 5- 380 - 867-8294  Web Address: www smokefree  gov  CARE AGREEMENT:   You have the right to help plan your care  Learn about your health condition and how it may be treated  Discuss treatment options with your healthcare providers to decide what care you want to receive  You always have the right to refuse treatment   The above and workplace  Also, remove anything else that will tempt you to smoke, such as lighters, matches, or ashtrays  Clean your car, home, and places at work that smell like smoke  The smell of smoke can trigger a craving  · Identify triggers that make you want to smoke  This may include activities, feelings, or people  Also write down 1 way you can deal with each of your triggers  For example, if you want to smoke as soon as you wake up, plan another activity during this time, such as exercise  · Make a plan for how you will quit  Learn about the tools that can help you quit, such as medicine, counseling, or nicotine replacement therapy  Choose at least 2 options to help you quit  · Help your adolescent make a plan to quit  The plan will be more successful if your adolescent makes his or her own decisions  Do not try to pressure him or her to quit immediately or in a certain way  Be supportive and offer help if needed  Tools to help you stop smoking:   · Counseling  from a trained healthcare provider can provide you with support and skills to quit smoking  The provider will also teach you to manage your withdrawal symptoms and cravings  You may receive counseling from one counselor, in group therapy, or through phone therapy called a quit line  · Nicotine replacement therapy (NRT)  such as nicotine patches, gum, or lozenges may help reduce your nicotine cravings  You may get these without a doctor's order  Do not use e-cigarettes or smokeless tobacco in place of cigarettes or to help you quit  They still contain nicotine  · Prescription medicines  such as nasal sprays or nicotine inhalers may help reduce your withdrawal symptoms  Other medicines may also be used to reduce your urge to smoke  Ask your healthcare provider about these medicines  You may need to start certain medicines 2 weeks before your quit date for them to work well      · Hypnosis  is a practice that helps guide you through thoughts and feelings  Hypnosis may help decrease your cravings and make you more willing to quit  · Acupuncture therapy  uses very thin needles to balance energy channels in the body  This is thought to help decrease cravings and symptoms of nicotine withdrawal     · Support groups  let you talk to others who are trying to quit or have already quit  It may be helpful to speak with others about how they quit  Manage your cravings:   · Avoid situations, people, and places that tempt you to smoke  Go to nonsmoking places, such as libraries or restaurants  Understand what tempts you and try to avoid these things  · Keep your hands busy  Hold things such as a stress ball or pen  · Put candy or toothpicks in your mouth  Keep lollipops, sugarless gum, or toothpicks with you at all times  · Do not have alcohol or caffeine  These drinks may tempt you to smoke  Drink healthy liquids such as water or juice instead  · Reward yourself when you resist your cravings  Rewards will motivate you and help you stay positive  · Do an activity that distracts you from your craving  Examples include cleaning, creating art, or gardening  Prevent weight gain after you quit:  You may gain a few pounds after you quit smoking  It is healthier for you to gain a few pounds than to continue to smoke  The following can help you prevent weight gain:  · Eat a variety of healthy foods  Healthy foods include fruits, vegetables, whole-grain breads, low-fat dairy products, beans, lean meats, and fish  Eat healthy snacks, such as low-fat yogurt, if you get hungry between meals  · Drink water before, during, and between meals  This will make your stomach feel full and help prevent you from overeating  Ask your healthcare provider how much liquid to drink each day and which liquids are best for you  · Be physically active  Activity may help reduce your cravings and reduce stress   Take a walk or do some kind of physical activity every day  Ask your healthcare provider which activities are right for you  For support and more information:   · American Lung Association  [address] [facility]  [place] [address]  Phone: [facility] [address]  Phone: 9- 698 - [phone]  Web Address: [employer]    · Smokefree  gov  Phone: 1- 246 - 179-0434  Web Address: www smokefree   Shantal Paz 2021 Information is for End User's use only and may not be sold, redistributed or otherwise used for commercial purposes  All illustrations and images included in CareNotes® are the copyrighted property of A D A M , Inc  or [facility]  The above information is an  only  It is not intended as medical advice for individual conditions or treatments  Talk to your doctor, nurse or pharmacist before following any medical regimen to see if it is safe and effective for you  How to Stop Smoking   WHAT YOU NEED TO KNOW:   Why should I stop smoking? You will improve your health and the health of others around you if you stop smoking  Your risk for heart and lung disease, cancer, stroke, heart attack, and vision problems will also decrease  Your adolescent can help prevent or stop harm to his or her brain or body  This will help him or her become a healthy adult  You can benefit from quitting no matter how long you have smoked  How can I prepare to stop smoking? Nicotine is a highly addictive drug found in cigarettes  Withdrawal symptoms can happen when you stop smoking and make it hard to quit  These include anxiety, depression, irritability, trouble sleeping, and increased appetite  You increase your chances of success if you prepare to quit  · Set a quit date  Michell Hobbsy a date that is within the next 2 weeks  Do not pick a day that you think may be stressful or busy  Write down the day or [place] it on your calender  · Tell friends and family that you plan to quit    Explain that you may have withdrawal symptoms when you try to quit  Ask them to support you  They may be able to encourage you and help reduce your stress to make it easier for you to quit  · Make a list of your reasons for quitting  Put the list somewhere you will see it every day, such as your refrigerator  You can look at the list when you have a craving  · Remove all tobacco and nicotine products from your home, car, and workplace  Also, remove anything else that will tempt you to smoke, such as lighters, matches, or ashtrays  Clean your car, home, and places at work that smell like smoke  The smell of smoke can trigger a craving  · Identify triggers that make you want to smoke  This may include activities, feelings, or people  Also write down 1 way you can deal with each of your triggers  For example, if you want to smoke as soon as you wake up, plan another activity during this time, such as exercise  · Make a plan for how you will quit  Learn about the tools that can help you quit, such as medicine, counseling, or nicotine replacement therapy  Choose at least 2 options to help you quit  · Help your adolescent make a plan to quit  The plan will be more successful if your adolescent makes his or her own decisions  Do not try to pressure him or her to quit immediately or in a certain way  Be supportive and offer help if needed  What are some tools to help me stop smoking? · Counseling  from a trained healthcare provider can provide you with support and skills to quit smoking  The provider will also teach you to manage your withdrawal symptoms and cravings  You may receive counseling from one counselor, in group therapy, or through phone therapy called a quit line  · Nicotine replacement therapy (NRT)  such as nicotine patches, gum, or lozenges may help reduce your nicotine cravings  You may get these without a doctor's order  Do not use e-cigarettes or smokeless tobacco in place of cigarettes or to help you quit   They still contain nicotine  · Prescription medicines  such as nasal sprays or nicotine inhalers may help reduce your withdrawal symptoms  Other medicines may also be used to reduce your urge to smoke  Ask your healthcare provider about these medicines  You may need to start certain medicines 2 weeks before your quit date for them to work well  · Hypnosis  is a practice that helps guide you through thoughts and feelings  Hypnosis may help decrease your cravings and make you more willing to quit  · Acupuncture therapy  uses very thin needles to balance energy channels in the body  This is thought to help decrease cravings and symptoms of nicotine withdrawal     · Support groups  let you talk to others who are trying to quit or have already quit  It may be helpful to speak with others about how they quit  How can I manage my cravings? · Avoid situations, people, and places that tempt you to smoke  Go to nonsmoking places, such as libraries or restaurants  Understand what tempts you and try to avoid these things  · Keep your hands busy  Hold things such as a stress ball or pen  · Put candy or toothpicks in your mouth  Keep lollipops, sugarless gum, or toothpicks with you at all times  · Do not have alcohol or caffeine  These drinks may tempt you to smoke  Drink healthy liquids such as water or juice instead  · Reward yourself when you resist your cravings  Rewards will motivate you and help you stay positive  · Do an activity that distracts you from your craving  Examples include cleaning, creating art, or gardening  What should I know about weight gain after I quit? You may gain a few pounds after you quit smoking  It is healthier for you to gain a few pounds than to continue to smoke  The following can help you prevent weight gain:  · Eat a variety of healthy foods  Healthy foods include fruits, vegetables, whole-grain breads, low-fat dairy products, beans, lean meats, and fish   Eat healthy snacks, such as low-fat yogurt, if you get hungry between meals  · Drink water before, during, and between meals  This will make your stomach feel full and help prevent you from overeating  Ask your healthcare provider how much liquid to drink each day and which liquids are best for you  · Be physically active  Activity may help reduce your cravings and reduce stress  Take a walk or do some kind of physical activity every day  Ask your healthcare provider which activities are right for you  Where can I find support and more information? · [employer]  [address] [place] [address],[address]  [address]  Phone: [facility] [address]  Phone: 4- 271 - 323-6370  Web Address: [employer]    · Smokefree  gov  Phone: 4- 329 - 561-4847  Web Address: www smokefree  gov  CARE AGREEMENT:   You have the right to help plan your care  Learn about your health condition and how it may be treated  Discuss treatment options with your healthcare providers to decide what care you want to receive  You always have the right to refuse treatment  The above information is an  only  It is not intended as medical advice for individual conditions or treatments  Talk to your doctor, nurse or pharmacist before following any medical regimen to see if it is safe and effective for you  © Copyright [employer] 2021 Information is for End User's use only and may not be sold, redistributed or otherwise used for commercial purposes   All illustrations and images included in CareNotes® are the copyrighted property of A D A M , Inc  or [address] [place] [address] [email or web address]

## 2021-09-24 NOTE — ASSESSMENT & PLAN NOTE
History of AFib, presented with RVR in the setting of sepsis  Chronically on metoprolol succinate twice daily and AC with warfarin  · Required Cardizem gtt for rate control initially - weaned off  · Reintroduced metoprolol with tartrate formulation previously - increased dose again 9/17  · Warfarin was held in the setting of worsening anemia and previously supratherapeutic INR  · Resumed warfarin 9/18    · Increased coumadin to 10 mg PO Qdaily on 09/23/2021  · Give coumadin 10 mg PO on 09/24/2021 and then continue 5 mg PO Qdaily starting on 09/25/2021  · Recheck a PT/INR as an outpatient on Monday, 09/27/2021

## 2021-09-24 NOTE — ASSESSMENT & PLAN NOTE
Last ECHO 02/2021 with preserved LVEF, mildly increased LV wall thickness, and mildly reduced RV systolic function  · CT 9/13 with vascular congestion, IVC engorgement consistent with cardiac congestion  · Patient demonstrating weight gain and net positive fluid status  · Has received significant IV fluids in the setting of septic shock, which were discontinued 9/15  · Also with hypoalbuminemia which is contributing to worsening overall extremity edema  · Overall appears volume overloaded, but also with concurrent MARISABEL and low FENA  · Home diuretic therapy in the form of 80 mg furosemide BID has been on hold since admission  · Was initiated on IV furosemide 60 mg BID without adequate diuresis  · Initiated on lasix gtt 9/16 by Nephrology with good diuresis    · Continue to monitor daily weights, I/O's  · Increased the lasix drip/infusion to 20 mg/hr on 09/21/2021  · Transitioned to lasix 80 mg IV BID on 09/22/2021  · Changed to lasix 80 mg PO BID on 09/23/2021, which will be his dosing upon discharge  · The patient would like to split his afternoon dosing of lasix to 40 mg around lunch time and then 40 mg in the late afternoon, so the medication does not interfere as much with his sleeping due to frequent urination

## 2021-09-24 NOTE — DISCHARGE SUMMARY
[address] [facility] [address]  [facility] 1960[facility] 57 [facility]  [facility] MRN: [id number]  Unit/Bed#: 886-80 Encounter: [id number]  Primary Care Provider: Andrei Matthew DO   Date and time admitted to hospital: 9/13/2021  9:51 AM    * Severe sepsis with septic shock [facility])  Assessment & Plan  Presented with lower abdominal pain onset on the night prior to admission  Sepsis criteria present with tachycardia, tachypnea, hypotension, leukocytosis, and fever, with associated organ dysfunction with evidence of MARISABEL  Source presumed to be skin and soft tissue in patient with evidence of panniculitis  Sepsis has resolved  · CT with dermal thickening and subcutaneous edema in the ventral lower pannus without collection, soft tissue gas, or inflammatory changes extending into the perineum  · Completed a 10-day course of broad-spectrum antibiotic treatment on 09/22/2021 with IV vancomycin and IV cefepime  · Initially on IV fluids - discontinued  · Levophed successfully weaned off  · Right-sided IJ TLC discontinued   Blood cultures with no growth X 5 days   Procalcitonin elevated 3 89 --> 7 87, 8 77, 7 42, 2 59, 1 51, 0 67   Was initiated on midodrine, octreotide, and additional albumin by Critical Care Medicine previously  All discontinued  [address] and Nephrology consulted  Panniculitis  Assessment & Plan  · Please see the plan for "Severe sepsis with septic shock"    Pleural effusion, right  Assessment & Plan  History of a chronic, partially loculated right pleural effusion with associated atelectasis at the right lung base  He was admitted from 05/26 through 06/09/2021 for this, and transferred to [facility] - treated with chest tube with infusion of tPA  Repeat scan on 05/30 revealed a loculated right hydropneumothorax, after which he underwent a right-sided thoracoscopic decortication 06/02/2021    He was scheduled for thoracentesis via IR on 09/14 - given clinical picture decided to hold off on thoracentesis  CT of the chest shows a large chronic right pleural effusion with probable partial loculations  Will continue to hold off repeat tap and monitor  Outpatient follow-up with Pulmonology    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 ascites [facility])  Assessment & Plan  Known past medical history secondary to alcoholic and fatty liver disease  · Follows outpatient with Gastroenterology; last seen on 08/04/2021  · Not a candidate for liver transplant given BMI and comorbidities  · Avoid hepatotoxic medications  · Follow the liver function tests as an outpatient with his PCP    Neutropenia ([facility] [address] )  Assessment & Plan  · Likely due to chronic liver disease  · Follow the absolute neutrophil count  · Follow the CBC after discharge with his PCP  · Outpatient Hematology/Oncology evaluation    Elevated alkaline phosphatase level  Assessment & Plan  · Follow the total alkaline phosphatase level after discharge with his PCP    Hyperphosphatemia  Assessment & Plan  · Management per Nephrology  · Follow the phosphorus level after discharge with Nephrology    Pancytopenia [facility])  Assessment & Plan  · Likely due to chronic liver disease  · Follow the CBC after discharge with his PCP  · Outpatient Hematology/Oncology evaluation    Results from last 7 days   Lab Units 09/24/21  0429 09/23/21  0633 09/22/21  0424   WBC Thousand/uL 2 15* 2 00* 2 69*   HEMOGLOBIN g/dL 8 3* 8 1* 8 1*   HEMATOCRIT % 27 3* 28 1* 28 1*   PLATELETS Thousands/uL 172 170 129*         NSVT (nonsustained ventricular tachycardia) (HCC)  Assessment & Plan  Brief run of non-sustained V-tach on cardiopulmonary monitoring on 09/15 late in the afternoon  Patient's EF approximately 6 months ago was preserved  Has remained in AFib and with sepsis, original septic shock  Was also maintained on Levophed and Cardizem gtt  Patient was asymptomatic and episode was very brief    Continue to monitor renal function and electrolytes carefully, and aim for potassium level greater than 4 and magnesium level greater than 2  Continue PO metoprolol    Outpatient follow-up with Cardiology        Hypoalbuminemia  Assessment & Plan  In the setting of cirrhosis  Given patient's septic shock, hypotension, and concurrent low albumin - has received albumin infusions  Chronic right-sided congestive heart failure [facility])  Assessment & Plan  Last ECHO 02/2021 with preserved LVEF, mildly increased LV wall thickness, and mildly reduced RV systolic function  · CT 9/13 with vascular congestion, IVC engorgement consistent with cardiac congestion  · Patient demonstrating weight gain and net positive fluid status  · Has received significant IV fluids in the setting of septic shock, which were discontinued 9/15  · Also with hypoalbuminemia which is contributing to worsening overall extremity edema  · Overall appears volume overloaded, but also with concurrent MARISABEL and low FENA  · Home diuretic therapy in the form of 80 mg furosemide BID has been on hold since admission  · Was initiated on IV furosemide 60 mg BID without adequate diuresis  · Initiated on lasix gtt 9/16 by Nephrology with good diuresis    · Continue to monitor daily weights, I/O's  · Increased the lasix drip/infusion to 20 mg/hr on 09/21/2021  · Transitioned to lasix 80 mg IV BID on 09/22/2021  · Changed to lasix 80 mg PO BID on 09/23/2021, which will be his dosing upon discharge  · The patient would like to split his afternoon dosing of lasix to 40 mg around lunch time and then 40 mg in the late afternoon, so the medication does not interfere as much with his sleeping due to frequent urination    Acute kidney injury superimposed on chronic kidney disease [facility])  Assessment & Plan  Lab Results   Component Value Date    EGFR 41 09/24/2021    EGFR 42 09/23/2021    EGFR 42 09/22/2021    CREATININE 1 76 (H) 09/24/2021    CREATININE 1 70 (H) 09/23/2021    CREATININE 1 70 (H) 09/22/2021   · Present on admission with a creatinine of 2 06, with baseline of 1 2 -1 4 - creatinine had been improving, worsened previously to a value of 2 86  Initial presentation with sepsis and septic shock - presumed prerenal etiology with creatinine improving with IV fluids  Patient with concurrent worsening anemia and hypoalbuminemia as well  Patient also has a history of right-sided and potentially diastolic CHF, and appears to be volume overloaded, with evidence of vascular congestion and IVC engorgement by CT, weight gain, and net positive fluid balance  Patient remains overall volume overloaded but improved - creatinine improved to 2 11 this morning with aggressive diuresis  · Remains off IV fluids  · Attempts at bladder scan unsuccessful due to pain - patient with overlying panniculitis  · Renal ultrasound without evidence of hydronephrosis  · Increased the lasix drip/infusion to 20 mg/hr on 09/21/2021  · Transitioned to lasix 80 mg IV BID on 09/22/2021  · Changed to lasix 80 mg PO BID on 09/23/2021, which will be his dosing upon discharge  · The patient would like to split his afternoon dosing of lasix to 40 mg around lunch time and then 40 mg in the late afternoon, so the medication does not interfere as much with his sleeping due to frequent urination  · Continue to avoid nephrotoxins, and renally dose medications when appropriate  · Nephrology officially consulted  · Outpatient follow-up with Nephrology    Hyponatremia  Assessment & Plan  Baseline in the 130-140s range, presented with a value of 127 along with concurrent hypochloremia  In the setting of sepsis thought secondary to hypovolemia, but patient appears volume overloaded with sodium unchanged despite IV fluids  Current levels improving with diuresis and normalized today to 140  Low-sodium is very likely due to hypervolemic hyponatremia  Hyponatremia has resolved  Continue to monitor after discharge      Results from last 7 days   Lab Units 09/24/21  0429 09/23/21  0633 09/22/21  0424   SODIUM mmol/L 141 141 139   POTASSIUM mmol/L 3 5 3 9 3 8   CHLORIDE mmol/L 100 101 98*   CO2 mmol/L 33* 34* 35*   BUN mg/dL 57* 57* 57*   CREATININE mg/dL 1 76* 1 70* 1 70*   CALCIUM mg/dL 8 9 8 9 8 8         Type 2 diabetes mellitus with hyperglycemia, without long-term current use of insulin [facility])  Assessment & Plan  Lab Results   Component Value Date    HGBA1C 5 5 05/21/2021       Recent Labs     09/23/21  1114 09/23/21  1648 09/23/21 2002 09/24/21  0702   POCGLU 106 198* 140 113       Blood Sugar Average: Last 72 hrs:  (P) 161 [id number]     · Not currently on any diabetic medications  · Insulin sliding scale with blood glucose monitoring ACHS   Outpatient follow-up with PCP in regards to this matter    Permanent atrial fibrillation [facility])  Assessment & Plan  History of AFib, presented with RVR in the setting of sepsis  Chronically on metoprolol succinate twice daily and AC with warfarin  · Required Cardizem gtt for rate control initially - weaned off  · Reintroduced metoprolol with tartrate formulation previously - increased dose again 9/17  · Warfarin was held in the setting of worsening anemia and previously supratherapeutic INR  · Resumed warfarin 9/18    · Increased coumadin to 10 mg PO Qdaily on 09/23/2021  · Give coumadin 10 mg PO on 09/24/2021 and then continue 5 mg PO Qdaily starting on 09/25/2021  · Recheck a PT/INR as an outpatient on Monday, 09/27/2021    Hypokalemia  Assessment & Plan  · Repleted per Nephrology  · Continue daily potassium chloride supplementation upon discharge  · Follow the potassium level and magnesium level after discharge with Nephrology    Results from last 7 days   Lab Units 09/24/21 0429 09/23/21 0633 09/22/21  0424   SODIUM mmol/L 141 141 139   POTASSIUM mmol/L 3 5 3 9 3 8   CHLORIDE mmol/L 100 101 98*   CO2 mmol/L 33* 34* 35*   BUN mg/dL 57* 57* 57*   CREATININE mg/dL 1 76* 1 70* 1 70*   CALCIUM mg/dL 8 9 8 9 8 8 Chronic anemia  Assessment & Plan  Known past medical history in the setting of CKD and cirrhosis, as well as colonic AVM's requiring APC in the past, and on chronic AC with warfarin  Baseline Hgb of 7-8  · Worsening anemia in the setting of acute illness  · FOBT negative X 2    · Has required transfusion with PRBCs, currently s/p 3 Units  · Continue to trend H/H - current stable  · Iron studies with low TIBC and elevated ferritin, consistent with anemia of chronic disease  Results from last 7 days   Lab Units 09/24/21  0429 09/23/21  0633 09/22/21  0424   WBC Thousand/uL 2 15* 2 00* 2 69*   HEMOGLOBIN g/dL 8 3* 8 1* 8 1*   HEMATOCRIT % 27 3* 28 1* 28 1*   PLATELETS Thousands/uL 172 170 129*         STEFF (obstructive sleep apnea)  Assessment & Plan  · Known past medical history with CPAP compliance at home  Continue CPAP HS per home settings  Chronic venous insufficiency  Assessment & Plan  Chronic venous insufficiency with concurrent hypoalbuminemia, cirrhosis, and right-sided CHF  Patient has also been fluid resuscitated with worsening lower extremity edema  Discharging Physician / Practitioner: Juanito Escalante DO  PCP: Zachary Hanson DO  Admission Date:   Admission Orders (From admission, onward)     Ordered        09/13/21 1506  Inpatient Admission  Once                   Discharge Date: 09/24/21    Consultations During Hospital Stay:  · Interventional Radiology  · Pulmonology/Critical Care Medicine  · Nephrology    Procedures Performed:   · None    Significant Findings / Test Results:   CT chest abdomen pelvis wo contrast    Result Date: 9/13/2021  Impression: 1  Disproportionate dermal thickening and subcutaneous edema in the ventral lower pannus without encapsulated collections, soft tissue gas, or inflammatory changes extending into the perineum  Finding is superimposed on mild diffuse anasarca   2   Large, chronic, partially loculated right pleural effusion with associated rounded atelectasis at right lung base  3   Mild pulmonary interstitial edema without alveolar consolidations  Workstation performed: [id number]     XR chest portable ICU    Result Date: 9/14/2021  Impression: Right jugular catheter in right atrium  To be at the cavoatrial junction, it could be retracted 3 cm  Redemonstration of right base opacity due to small chronic loculated right pleural effusion and rounded atelectasis in the right base  Workstation performed: [id number]     US kidney and bladder    Result Date: 9/16/2021  Impression: Technically limited examination due to body habitus and extensive lower abdominal wall edema  No hydronephrosis  Urinary bladder is obscured   Workstation performed: [id number]     ECG 12 lead  Order: [id number]  Status:  Final result   Visible to patient:  No (inaccessible in [address] [facility])   Next appt:  10/05/2021 at 02:40 PM in Pulmonology ([address] [place] [address])   0 Result Notes   Ref Range & Units 9/13/21 0956   Ventricular Rate     Atrial Rate     DE Interval ms    QRSD Interval ms 96    QT Interval ms 322    QTC Interval ms 520    P Axis degrees    QRS Axis degrees 123    T Wave Axis degrees 37       Narrative & Impression    Atrial fibrillation with rapid ventricular response  Right axis deviation  Incomplete right bundle branch block  Possible Right ventricular hypertrophy  Abnormal ECG  When compared with ECG of 22-MAY-2021 08:33,  QRS axis Shifted right  Nonspecific T wave abnormality now evident in Inferior leads  Nonspecific T wave abnormality, worse in Anterolateral leads  Confirmed by Jimmy Alonso ([id number]) on 9/14/2021 12:24:10 PM      Specimen Collected: 09/13/21 09:56   Last Resulted: 09/14/21 12:24           Results from last 7 days   Lab Units 09/24/21  0429 09/23/21  0633 09/22/21  0424   WBC Thousand/uL 2 15* 2 00* 2 69*   HEMOGLOBIN g/dL 8 3* 8 1* 8 1*   HEMATOCRIT % 27 3* 28 1* 28 1*   PLATELETS Thousands/uL 172 170 129* Results from last 7 days   Lab Units 09/24/21  0429 09/23/21  0633 09/22/21  0424   SODIUM mmol/L 141 141 139   POTASSIUM mmol/L 3 5 3 9 3 8   CHLORIDE mmol/L 100 101 98*   CO2 mmol/L 33* 34* 35*   BUN mg/dL 57* 57* 57*   CREATININE mg/dL 1 76* 1 70* 1 70*   CALCIUM mg/dL 8 9 8 9 8 8     Results from last 7 days   Lab Units 09/24/21  0429 09/23/21  0633 09/22/21  0424   MAGNESIUM mg/dL 2 3 2 1 2 1     Results from last 7 days   Lab Units 09/24/21  0429 09/23/21  9085 09/22/21  0424 09/20/21  0457 09/19/21  0602 09/18/21  0553   ALK PHOS U/L 184* 177* 191* 206* 194* 164*   BILIRUBIN DIRECT mg/dL  --   --   --  0 38* 0 39* 0 37*   ALT U/L 21 16 18 19 12 15   AST U/L 24 23 24 16 14 14     Microbiology Results (last 21 days)    Collected Updated Procedure Result Status Patient Facility Result Comment    09/20/2021 2256 09/21/2021 0046 NOVEL CORONAVIRUS (COVID-19), PCR [facility] [[id number]]    Nasopharyngeal Swab    Final result [address] The specimen collection materials, transport medium, and/or testing methodology utilized in the production of these test results have been proven to be reliable in a limited validation with an abbreviated program under the Emergency Utilization Authorization provided by the FDA  Raoul Payne reported as "Presumptive positive" will be confirmed with secondary testing to ensure result accuracy   Clinical caution and judgement should be used with the interpretation of these results with consideration of the clinical impression and other laboratory testing   Testing reported as "Positive" or "Negative" has been proven to be accurate according to standard laboratory validation requirements   All testing is performed with control materials showing appropriate reactivity at standard intervals   Component Value   SARS-CoV-2 Negative              09/20/2021 1554 09/21/2021 1702 Urine culture [[id number]]   Urine, Clean Catch    Final result [address] Component Value   Urine Culture No Growth <1000 cfu/mL              09/14/2021 1830 09/21/2021 1831 Occult blood 1-3, stool [[id number]]   Stool from Rectum    Final result [address]  Component Value   Fecal Occult Blood Diagnostic Negative   Fecal Occult Blood Diagnostic 2 Negative   Fecal Occult Blood Diagnostic 3 Test not performed            09/14/2021 1738 09/16/2021 1235 MRSA culture [[id number]]   Nares from Nose    Final result [address]  Component Value   MRSA Culture Only No Methicillin Resistant Staphlyococcus aureus (MRSA) isolated              09/13/2021 1100 09/18/2021 1702 Blood culture #1 [[id number]]   Blood from Arm, Left    Final result [address]  Component Value   Blood Culture No Growth After 5 Days  09/13/2021 1100 09/18/2021 1702 Blood culture #2 [[id number]]   Blood from Arm, Right    Final result [address]  Component Value   Blood Culture No Growth After 5 Days  Test Results Pending at Discharge (will require follow-up):  · None     Outpatient Tests Requested:  · CBC with diff , CMP, Magnesium level, Phosphorus level, and PT/INR on Monday, 75/09/2260    Complications:  None    Reason for Admission:  Severe sepsis with septic shock    Hospital Course:   Nereida Gomez is a 57 y o  male patient who originally presented to the hospital on 9/13/2021 due to intractable abdominal pain  Please see above list of diagnoses and related plan for additional information  Condition at Discharge: good    Discharge Day Visit / Exam:   Subjective: The patient was seen and examined  The patient is doing better  No chest pain  No shortness of breath  No abdominal pain  No nausea or vomiting      Vitals: Blood Pressure: 121/76 (09/24/21 0650)  Pulse: (!) 107 (09/24/21 0650)  Temperature: (!) 96 7 °F (35 9 °C) (09/24/21 0650)  Temp Source: Temporal (09/24/21 6373)  Respirations: 16 (09/24/21 0650)  Height: 6' (182 9 cm) (09/13/21 1602)  Weight - Scale: (!) 144 kg (317 lb 3 9 oz) (09/22/21 0544)  SpO2: 95 % (09/24/21 0650)  Exam:   Physical Exam   General:  NAD, follows commands  HEENT:  NC/AT, mucous membranes moist  Neck:  Supple, No JVP elevation  CV:  + S1, + S2, Intermittent tachycardia, Irregularly irregular rhythm  Pulm:  Lung fields are CTA bilaterally  Abd:  Soft, Non-tender, Non-distended  Ext:  No clubbing/cyanosis, Improved edema of the bilateral lower extremities  Skin:  No rashes  Neuro:  Awake, alert, oriented  Psych:  Normal mood and affect      Discussion with Family: Updated  (wife) at bedside  Discharge instructions/Information to patient and family:  See after visit summary for information provided to patient and family  Provisions for Follow-Up Care:  See after visit summary for information related to follow-up care and any pertinent home health orders  Disposition:   Home with VNA Services (Reminder: Complete face to face encounter)    Planned Readmission:  No     Discharge Statement:  I spent 45 minutes discharging the patient  This time was spent on the day of discharge  I had direct contact with the patient on the day of discharge  Greater than 50% of the total time was spent examining patient, answering all patient questions, arranging and discussing plan of care with patient as well as directly providing post-discharge instructions  Additional time then spent on discharge activities  Discharge Medications:  See after visit summary for reconciled discharge medications provided to patient and/or family        **Please Note: This note may have been constructed using a voice recognition system**

## 2021-09-24 NOTE — ASSESSMENT & PLAN NOTE
Lab Results   Component Value Date    HGBA1C 5 5 05/21/2021       Recent Labs     09/23/21  1114 09/23/21  1648 09/23/21 2002 09/24/21  0702   POCGLU 106 198* 140 113       Blood Sugar Average: Last 72 hrs:  (P) 861 [id number]     · Not currently on any diabetic medications  · Insulin sliding scale with blood glucose monitoring ACHS   Outpatient follow-up with PCP in regards to this matter

## 2021-09-24 NOTE — ASSESSMENT & PLAN NOTE
· Repleted per Nephrology  · Continue daily potassium chloride supplementation upon discharge  · Follow the potassium level and magnesium level after discharge with Nephrology    Results from last 7 days   Lab Units 09/24/21  0429 09/23/21  0633 09/22/21  0424   SODIUM mmol/L 141 141 139   POTASSIUM mmol/L 3 5 3 9 3 8   CHLORIDE mmol/L 100 101 98*   CO2 mmol/L 33* 34* 35*   BUN mg/dL 57* 57* 57*   CREATININE mg/dL 1 76* 1 70* 1 70*   CALCIUM mg/dL 8 9 8 9 8 8

## 2021-09-24 NOTE — CASE MANAGEMENT
Case Management Discharge Planning Note    Patient name Sree Ferraro /[id number] MRN [id number]  : 1960 Date 2021       Current Admission Date: 2021  Current Admission Diagnosis:  Severe sepsis with septic shock [facility])  Previous Admission - Discharge Date:21   LOS (days): 11  Geometric Mean LOS (GMLOS) (days): 4 80  Days to GMLOS:-6 Previous Discharge Diagnosis:  There are no discharge diagnoses documented for the most recent discharge  OBJECTIVE:  Risk of Unplanned Readmission Score: 33   Bundle(if applicable):    Current admission status: Inpatient  Preferred Pharmacy:   [address] [address]  Phone: [phone] Fax: [phone]    Primary Care Provider: Nisha Crane DO    Primary Insurance: [employer] [facility] RE[facility]  Secondary Insurance:     DISCHARGE DETAILS:    Discharge planning discussed with[de-identified] Pt  Freedom of Choice: Yes       [address] [place] [address]         Is the patient interested in [address] at discharge?: No    DME Referral Provided  Referral made for DME?: No       Discharge Destination Plan[de-identified] Home     Type of Transport: Family (Pt's wife will give the patient a ride home )        ETA of Transport (Date): 21     I in basket messaged Dr Chapa's office that patient is being discharged today  Pt is high risk readmission and needs an appt in 3-5 days  I also messaged cardiology to call patient with an appt  AVS reviewed with patient with a good understanding

## 2021-09-24 NOTE — ASSESSMENT & PLAN NOTE
Lab Results   Component Value Date    EGFR 41 09/24/2021    EGFR 42 09/23/2021    EGFR 42 09/22/2021    CREATININE 1 76 (H) 09/24/2021    CREATININE 1 70 (H) 09/23/2021    CREATININE 1 70 (H) 09/22/2021   · Present on admission with a creatinine of 2 06, with baseline of 1 2 -1 4 - creatinine had been improving, worsened previously to a value of 2 86  Initial presentation with sepsis and septic shock - presumed prerenal etiology with creatinine improving with IV fluids  Patient with concurrent worsening anemia and hypoalbuminemia as well  Patient also has a history of right-sided and potentially diastolic CHF, and appears to be volume overloaded, with evidence of vascular congestion and IVC engorgement by CT, weight gain, and net positive fluid balance  Patient remains overall volume overloaded but improved - creatinine improved to 2 11 this morning with aggressive diuresis  · Remains off IV fluids  · Attempts at bladder scan unsuccessful due to pain - patient with overlying panniculitis  · Renal ultrasound without evidence of hydronephrosis  · Increased the lasix drip/infusion to 20 mg/hr on 09/21/2021  · Transitioned to lasix 80 mg IV BID on 09/22/2021  · Changed to lasix 80 mg PO BID on 09/23/2021, which will be his dosing upon discharge  · The patient would like to split his afternoon dosing of lasix to 40 mg around lunch time and then 40 mg in the late afternoon, so the medication does not interfere as much with his sleeping due to frequent urination  · Continue to avoid nephrotoxins, and renally dose medications when appropriate    · Nephrology officially consulted  · Outpatient follow-up with Nephrology

## 2021-09-27 NOTE — UTILIZATION REVIEW
Notification of Discharge   This is a Notification of Discharge from our facility [address]  Please be advised that this patient has been discharge from our facility  Below you will find the admission and discharge date and time including the patients disposition  UTILIZATION REVIEW CONTACT:  Martinez Gtz  Utilization   Network Utilization Review Department  Phone: [phone] x carefully listen to the prompts  All voicemails are confidential   Email: [email or web address]     PHYSICIAN ADVISORY SERVICES:  FOR CCYW-VP-ICRO REVIEW - MEDICAL NECESSITY DENIAL  Phone: [phone]  Fax: [phone]  Email: [email or web address]     PRESENTATION DATE: 9/13/2021  9:51 AM  OBERVATION ADMISSION DATE:   INPATIENT ADMISSION DATE: 9/13/21  3:06 PM   DISCHARGE DATE: 9/24/2021 11:15 AM  DISPOSITION: Home/Self Care Home/Self Care      IMPORTANT INFORMATION:  Send all requests for admission clinical reviews, approved or denied determinations and any other requests to dedicated fax number below belonging to the campus where the patient is receiving treatment   List of dedicated fax numbers:  [address] DENIALS (Administrative/Medical Necessity) [phone]   [address] (Maternity/NICU/Pediatrics) [phone]   Solomon Oshea [phone]   Jose Sarkar [phone]   Hernan Garcia [phone]   [address] [phone]   [facility]  [phone]   [address] [facility] [address] [facility]  24[address] [facility] [address] [facility] [phone]

## 2021-09-27 NOTE — PATIENT INSTRUCTIONS
Edema   WHAT YOU NEED TO KNOW:   What is edema? Edema is swelling throughout your body  Edema is usually a sign that you are retaining fluid  The swelling may be caused by heart failure or kidney, thyroid, or liver disease  It may also be caused by medicines such as antidepressants, blood pressure medicines, or hormones  Sudden swelling around the lips or face may be a sign of a severe allergic reaction  Swelling of an arm or leg may be caused by blockage of your veins  What other signs and symptoms may occur with edema? · Discomfort or tenderness in the swollen areas    · Tight and shiny skin over the swollen areas    · Weight gain    How is edema diagnosed? Your healthcare provider will ask about your symptoms and any other symptoms you have  He may also ask about any medical conditions you have  Your healthcare provider will examine your skin over the swollen areas  He may gently push on the swollen area for a short time to see if this leaves a dimple  He may also order tests to find the cause of your edema  How is edema treated and managed? Treatment for edema depends on the cause  Depending on your medical condition, you may be given medicine to help get rid of extra body fluid  Your healthcare provider may suggest that you do any of the following to help manage edema:  · Elevate  your arms or legs as directed  Raise them above the level of your heart as often as you can  This will help decrease swelling and pain  Prop them on pillows or blankets to keep them elevated comfortably  · Wear pressure stockings as directed  The stockings are tight and put pressure on your legs  This helps to keep fluid from collecting in your legs or ankles  · Limit your salt intake  Salt causes your body to hold water  Ask about any other changes to your diet  · Stay active  Do not stand or sit for long periods of time  Ask your healthcare provider about the best exercise plan for you      · Keep your skin moist  using lotion, cream, or ointment  Ask your healthcare provider what to use and how often to use it  When should I contact my healthcare provider? · The swollen area feels cold and is pale or blue in color  · The swollen area feels warm, painful, and is red in color  · You have increased swelling or swelling in other parts of your body  · You have questions or concerns about your condition or care  When should I seek immediate care? · You have shortness of breath at rest, especially when you lie down  · You cough up pink, foamy sputum  · You have chest pain  · Your heartbeat is fast or uneven  CARE AGREEMENT:   You have the right to help plan your care  Learn about your health condition and how it may be treated  Discuss treatment options with your healthcare providers to decide what care you want to receive  You always have the right to refuse treatment  The above information is an  only  It is not intended as medical advice for individual conditions or treatments  Talk to your doctor, nurse or pharmacist before following any medical regimen to see if it is safe and effective for you  © Copyright [employer] 2021 Information is for End User's use only and may not be sold, redistributed or otherwise used for commercial purposes   All illustrations and images included in CareNotes® are the copyrighted property of [employer]  or [address] [place] [address] [employer]

## 2021-09-27 NOTE — PROGRESS NOTES
Subjective:        Patient ID: Beau Clemens is a 57 y o  male  Chief Complaint:  Eddie Fitzpatrick was recently in the hospital for 10 days for what sounds like panniculitis and sepsis complicated by anemia requiring transfusion after record review  He needed a little bit of IV diuresis as well, now he is back on his maintenance Lasix and metolazone  Says his weight has been good at home  He denies any alarming shortness of breath  No chest pains  No palpitations  No fevers chills or rigors  The following portions of the patient's history were reviewed and updated as appropriate: allergies, current medications, past family history, past medical history, past social history, past surgical history and problem list   Review of Systems   Constitutional: Negative for chills, diaphoresis, malaise/fatigue and weight gain  HENT: Negative for nosebleeds and stridor  Eyes: Negative for double vision, vision loss in left eye, vision loss in right eye and visual disturbance  Cardiovascular: Positive for dyspnea on exertion ( chronic) and leg swelling (Chronic)  Negative for chest pain, claudication, cyanosis, irregular heartbeat, near-syncope, orthopnea, palpitations, paroxysmal nocturnal dyspnea and syncope  Respiratory: Negative for cough, shortness of breath, snoring and wheezing  Endocrine: Negative for polydipsia, polyphagia and polyuria  Hematologic/Lymphatic: Negative for bleeding problem  Does not bruise/bleed easily  Skin: Negative for flushing and rash  Musculoskeletal: Negative for falls and myalgias  Gastrointestinal: Negative for abdominal pain, heartburn, hematemesis, hematochezia, melena and nausea  Genitourinary: Negative for hematuria  Neurological: Negative for brief paralysis, dizziness, focal weakness, headaches, light-headedness, loss of balance and vertigo  Psychiatric/Behavioral: Negative for altered mental status and substance abuse     Allergic/Immunologic: Negative for hives           Objective:      /80   Pulse 80   Ht 6' (1 829 m)   Wt (!) 146 kg (322 lb)   BMI 43 67 kg/m²   Physical Exam  Constitutional:       General: He is not in acute distress  Appearance: He is well-developed  He is not ill-appearing or diaphoretic  HENT:      Head: Normocephalic and atraumatic  Eyes:      General: No scleral icterus  Pupils: Pupils are equal, round, and reactive to light  Neck:      Thyroid: No thyromegaly  Vascular: No JVD  Cardiovascular:      Rate and Rhythm: Normal rate  Rhythm irregular  Heart sounds: Normal heart sounds  No murmur heard  No friction rub  No gallop  Pulmonary:      Effort: Pulmonary effort is normal  No respiratory distress  Breath sounds: Normal breath sounds  No stridor  No wheezing or rales  Abdominal:      General: Bowel sounds are normal  There is no distension  Palpations: Abdomen is soft  There is no mass  Tenderness: There is no abdominal tenderness  Musculoskeletal:         General: Swelling present  No deformity  Normal range of motion  Cervical back: Normal range of motion and neck supple  Right lower leg: Edema present  Left lower leg: Edema (Severe chronic bilateral edema of lower extremities) present  Skin:     General: Skin is warm and dry  Coloration: Skin is not pale  Findings: No erythema  Neurological:      Mental Status: He is alert and oriented to person, place, and time  Coordination: Coordination normal    Psychiatric:         Behavior: Behavior normal          Thought Content: Thought content normal          Judgment: Judgment normal          Lab Review:   No results displayed because visit has over 200 results        Anticoag visit on 08/23/2021   Component Date Value    POCT INR 08/23/2021 2 1    Anticoag visit on 08/12/2021   Component Date Value    POCT INR 08/12/2021 3 5    Appointment on 08/03/2021   Component Date Value    WBC 08/03/2021 6 47  RBC 08/03/2021 2 82*    Hemoglobin 08/03/2021 7 8*    Hematocrit 08/03/2021 25 8*    MCV 08/03/2021 92     MCH 08/03/2021 27 7     MCHC 08/03/2021 30 2*    RDW 08/03/2021 15 1     MPV 08/03/2021 10 0     Platelets 97/90/5213 266     nRBC 08/03/2021 0     Neutrophils Relative 08/03/2021 76*    Immat GRANS % 08/03/2021 1     Lymphocytes Relative 08/03/2021 10*    Monocytes Relative 08/03/2021 10     Eosinophils Relative 08/03/2021 2     Basophils Relative 08/03/2021 1     Neutrophils Absolute 08/03/2021 4 99     Immature Grans Absolute 08/03/2021 0 04     Lymphocytes Absolute 08/03/2021 0 62     Monocytes Absolute 08/03/2021 0 65     Eosinophils Absolute 08/03/2021 0 10     Basophils Absolute 08/03/2021 0 07     PTH 08/03/2021 70 6     Vitamin B-12 08/03/2021 611     Iron Saturation 08/03/2021 8     TIBC 08/03/2021 385     Iron 08/03/2021 29*    Ferritin 08/03/2021 100      CT chest abdomen pelvis wo contrast    Result Date: 9/13/2021  Narrative: CT CHEST, ABDOMEN AND PELVIS WITHOUT IV CONTRAST INDICATION:   Panniculitis and sepsis  Evaluation for evidence of necrotizing fracture  COMPARISON:  None  TECHNIQUE: CT examination of the chest, abdomen and pelvis was performed without intravenous contrast   Axial, sagittal, and coronal 2D reformatted images were created from the source data and submitted for interpretation  Radiation dose length product (DLP) for this visit:  2456 99 mGy-cm   This examination, like all CT scans performed in the [facility], was performed utilizing techniques to minimize radiation dose exposure, including the use of iterative reconstruction and automated exposure control  Enteric contrast was administered  FINDINGS: CHEST LUNGS AND PLEURA:  Large chronic right pleural effusion with probable partial loculation at the anterolateral base  Unchanged round atelectasis of the adjacent basilar right middle and lower lobes  No acute consolidations  Bilateral straight interlobular septal thickening suggesting edema  No nodularity or evidence for fibrosis  No endobronchial lesions  No left-sided effusion  No pneumothorax on either side  HEART/GREAT VESSELS:  Four-chamber enlargement of the heart  Hypodensity of the blood pole relative to intraventricular septal myocardium may suggest anemia  Coronary calcifications  Small amount of pericardial fluid not qualifying as effusion  Thoracic aorta is normal for age  Chronic ectasia of the main pulmonary artery up to 41 mm  MEDIASTINUM AND DILLON:  Right paratracheal node at the thoracic inlet measuring 7 mm in short axis (2/12) --similar to prior  Other shotty lymph nodes in the pretracheal space and AP window also appear unchanged  No pathologic lymphadenopathy  CHEST WALL AND LOWER NECK:   Symmetric moderate to severe discogenic gynecomastia  Mild diffuse anasarca  Superficial edema of the chest is less severe than at the abdomen--discussed below  ABDOMEN LIVER/BILIARY TREE:  Relative hypertrophy of the left lobe compared to the right  Lobular contour  Mild parenchymal heterogeneity  Constellation of findings consistent with cirrhosis  No discrete masses evident on this noncontrast study  No biliary ductal dilatation  GALLBLADDER: Chronically contracted with endoluminal stones  No evidence for acute cholecystitis  SPLEEN:  Unchanged chronic congestive splenomegaly with bandlike parenchymal calcifications probably related to prior infarcts or trauma  Appearance is unchanged from priors  PANCREAS:  Unremarkable  ADRENAL GLANDS:  Somewhat obscured by adjacent edema in the periadrenal fat  No gross abnormalities  KIDNEYS/URETERS: No contour distorting masses, perinephric collections, urolithiasis, or hydronephrosis  STOMACH AND BOWEL: Small hiatal hernia  No gastric wall thickening  No dilated or inflamed loops of small bowel  No colonic inflammation  APPENDIX:  Not well demonstrated    No evidence for acute appendicitis  ABDOMINOPELVIC CAVITY:  Strandy mesenteric edema and free fluid tracking along the pericolic gutters  32 mm rounded, smoothly marginated cystic focus adjacent to the 2nd portion of the duodenum appears unchanged since August 2020, but new from more remote priors, speaking against duplication cyst   This may represent duodenal diverticulum  Internal homogeneous fluid density and lack of change from priors confirms benignancy  No free air  Shotty bilateral iliac lymph nodes  None are pathologically enlarged by size criteria  VESSELS:  Evaluation of the hepatic vasculature limited by lack of contrast, though central portal venous structures appear somewhat stenotic  There is recannulization of the umbilical vein  Scattered perigastric and perisplenic varices are demonstrated as are splenorenal collaterals  Large superficial collateral vessels demonstrated in the proximal thigh and inguinal region  Pronounced engorgement of the IVC attributable to cardiac congestion  PELVIS REPRODUCTIVE ORGANS:  Unremarkable for patient's age  URINARY BLADDER:  Normal when accounting for degree of nondistention  ABDOMINAL WALL/INGUINAL REGIONS:  Diffuse anasarca  Focally worse subcutaneous stranding and dermal thickening at the ventral pannus  No evidence for inflammatory changes extending into the peritoneal fat  Inferior portion of the scrotum is not entirely covered, but visualized portion only shows mild edema  No encapsulated collections or gas within the soft tissues around the lower pannus, perineal region, and visualized portions of the scrotum  Large superficial varicosities around the medial thighs and inguinal regions, as previously discussed  Fat-containing left inguinal hernia  OSSEOUS STRUCTURES:  No acute fracture or destructive osseous lesion  Impression: 1    Disproportionate dermal thickening and subcutaneous edema in the ventral lower pannus without encapsulated collections, soft tissue gas, or inflammatory changes extending into the perineum  Finding is superimposed on mild diffuse anasarca  2   Large, chronic, partially loculated right pleural effusion with associated rounded atelectasis at right lung base  3   Mild pulmonary interstitial edema without alveolar consolidations  Workstation performed: [id number]     XR chest portable ICU    Result Date: 9/14/2021  Narrative: CHEST INDICATION:   verify placement of central line  COMPARISON:  Chest radiograph from 8/16/2021 and chest CT from 9/13/2021  EXAM PERFORMED/VIEWS:  XR CHEST PORTABLE ICU FINDINGS:  Right jugular catheter in right atrium  Moderate cardiomegaly  Redemonstration of small loculated right pleural effusion and rounded atelectasis in the right base  No pneumothorax  Osseous structures appear within normal limits for patient age  Impression: Right jugular catheter in right atrium  To be at the cavoatrial junction, it could be retracted 3 cm  Redemonstration of right base opacity due to small chronic loculated right pleural effusion and rounded atelectasis in the right base  Workstation performed: [id number]     US kidney and bladder    Result Date: 9/16/2021  Narrative: RENAL ULTRASOUND INDICATION:   MARISABEL  COMPARISON: None TECHNIQUE:   Ultrasound of the retroperitoneum was performed with a curvilinear transducer utilizing volumetric sweeps and still imaging techniques  FINDINGS: Technically limited examination due to body habitus  KIDNEYS: Within normal limits of size  Right kidney:  13 0 cm  Left kidney:  11 5 cm  Right kidney Normal echogenicity and contour  No suspicious masses detected  No hydronephrosis  No shadowing calculi  No perinephric fluid collections  Left kidney Normal echogenicity and contour  No suspicious masses detected  No hydronephrosis  No shadowing calculi  No perinephric fluid collections  URETERS: Nonvisualized   BLADDER: The urinary bladder is obscured due to body habitus and extensive lower abdominal wall edema  Impression: Technically limited examination due to body habitus and extensive lower abdominal wall edema  No hydronephrosis  Urinary bladder is obscured  Workstation performed: [id number]         Assessment:       1  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 ascites, unspecified hepatic cirrhosis type ([facility] )     2  Chronic diastolic (congestive) heart failure (HCC)     3  Permanent atrial fibrillation ([facility] )     4  Chronic anemia          Plan:       Marks diastolic heart failure is compensated, advised he continue with his present diuretic regimen including Lasix and p r n  Metolazone  His AFib is rate controlled, should continue current dose Toprol and Coumadin to INR 2 0-3 0  Urged him to get his blood work done ordered at the end of this week  I made no medication changes today, plan to see him back in 3 months, see that he seeing you in the next week or 2 for follow-up

## 2021-09-27 NOTE — TELEPHONE ENCOUNTER
New Patient Encounter    New Patient Intake Form   Patient Details:  Rose Parkinson  1960  [id number]    Background Information:  [address] starts by opening a telephone encounter and gathering the following information   Who is calling to schedule? If not self, relationship to patient? Patient wife Rafael Mad    Referring Provider Dr Sruthi Carrillo    What is the diagnosis? Neutropenia   Is this Cancer or Non-Cancer? Non cancer    Is this diagnosis confirmed? Yes    When was the diagnosis? Is there a confirmed diagnosis from a biopsy/tissue reviewed by pathology? N/a    Were outside slides requested? N/a    Is patient aware of diagnosis? Yes    Is there a personal history and what kind? No    Is there a family history and what kind? no   Reason for visit? Consult    Have you had any imaging or labs done? If so: when, where? Yes    Are records in [address] [place] [address]? Yes    Are records needed form an outside facility? N/a    If yes, name, city, state where facility is located  N/a    If patient has a prior history of cancer were old records obtained? N/a    Was the patient told to bring a disk? N/a    Does the patient smoke or Vape? no   If yes, how many packs or cartridges per day? no   Scheduling Information:   Preferred [place]:  [place]    Are there any dates/time the patient cannot be seen?  Any    Miscellaneous:10/26/21 [address] [facility]

## 2021-10-14 PROBLEM — Z79.01 LONG TERM (CURRENT) USE OF ANTICOAGULANTS: Status: ACTIVE | Noted: 2021-01-01

## 2021-12-04 PROBLEM — D62 ACUTE BLOOD LOSS ANEMIA: Status: ACTIVE | Noted: 2021-01-01

## 2021-12-04 PROBLEM — S30.1XXA ABDOMINAL WALL HEMATOMA: Status: ACTIVE | Noted: 2021-01-01

## 2021-12-11 PROBLEM — E11.65 TYPE 2 DIABETES MELLITUS WITH HYPERGLYCEMIA, WITHOUT LONG-TERM CURRENT USE OF INSULIN (HCC): Status: RESOLVED | Noted: 2021-02-22 | Resolved: 2021-01-01

## 2021-12-23 NOTE — ASSESSMENT & PLAN NOTE
· Check an iron panel, vitamin B12 level, and folate level  · Iron panel: decreased iron and low normal ferritin, will initiate ferrous sulfate PO daily  · Check the patient's stool for occult blood x 3 specimens  · Follow the CBC  · Transfuse for a hemoglobin less than 7 g/dl     Results from last 7 days   Lab Units 03/01/21  0626 02/28/21  0454 02/27/21  0638   WBC Thousand/uL 5 81 5 75 5 46   HEMOGLOBIN g/dL 9 2* 8 4* 9 1*   HEMATOCRIT % 32 1* 28 4* 31 0*   PLATELETS Thousands/uL 187 175 169 ,

## 2021-12-29 NOTE — PROGRESS NOTES
[facility] [address]  coding opportunities          Number of diagnosis code(s) already on the problem list added to  fla                     Patients insurance company: [employer] (Medicare Advantage and Commercial)     Visit status: Patient canceled the appointment        [facility]  coding opportunities          Number of diagnosis code(s) already on the problem list added to  fla       K74 60 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 ascites ([facility] )  I13 0, I50 32, N18 31 Hypertensive heart and stage 3a chronic kidney disease with  chronic diastolic congestive heart failure ([facility] [address] )  I48 21 Permanent atrial fibrillation ([facility] )  E11 65 T2DM with hyperglycemia ([facility] )  I27 0 Pulmonary hypertension ([facility] )  D70 9 Neutropenia ([facility] )  E66 01 Morbid, severe, obesity due to excess calories [facility])    If this is correct please assess and document for the new year on 22               Patients insurance company: [address]  (Medicare Advantage and Commercial)

## 2021-12-30 NOTE — PLAN OF CARE
Problem: Potential for Falls  Goal: Patient will remain free of falls  Description: INTERVENTIONS:   - Keep Call bell within reach  - Keep care items and personal belongings within reach    Outcome: Progressing     Problem: INFECTION - ADULT  Goal: Absence or prevention of progression during hospitalization  Description: INTERVENTIONS:  - Assess and monitor for signs and symptoms of infection  - Monitor lab/diagnostic results  - Monitor all insertion sites, i e  indwelling lines, tubes, and drains  - Monitor endotracheal if appropriate and nasal secretions for changes in amount and color  - [place] appropriate cooling/warming therapies per order  - Administer medications as ordered  - Instruct and encourage patient and family to use good hand hygiene technique  - Identify and instruct in appropriate isolation precautions for identified infection/condition  Outcome: Progressing     Problem: Knowledge Deficit  Goal: Patient/family/caregiver demonstrates understanding of disease process, treatment plan, medications, and discharge instructions  Description: Complete learning assessment and assess knowledge base    Interventions:  - Provide teaching at level of understanding  - Provide teaching via preferred learning methods  Outcome: Progressing

## 2022-01-01 ENCOUNTER — APPOINTMENT (INPATIENT)
Dept: RADIOLOGY | Facility: HOSPITAL | Age: 62
DRG: 870 | End: 2022-01-01
Payer: COMMERCIAL

## 2022-01-01 ENCOUNTER — APPOINTMENT (EMERGENCY)
Dept: CT IMAGING | Facility: HOSPITAL | Age: 62
DRG: 291 | End: 2022-01-01
Payer: COMMERCIAL

## 2022-01-01 ENCOUNTER — TELEPHONE (OUTPATIENT)
Dept: CARDIOLOGY CLINIC | Facility: CLINIC | Age: 62
End: 2022-01-01

## 2022-01-01 ENCOUNTER — ANESTHESIA (INPATIENT)
Dept: ANESTHESIOLOGY | Facility: HOSPITAL | Age: 62
DRG: 870 | End: 2022-01-01
Payer: COMMERCIAL

## 2022-01-01 ENCOUNTER — ANESTHESIA EVENT (INPATIENT)
Dept: ANESTHESIOLOGY | Facility: HOSPITAL | Age: 62
DRG: 870 | End: 2022-01-01
Payer: COMMERCIAL

## 2022-01-01 ENCOUNTER — HOSPITAL ENCOUNTER (INPATIENT)
Facility: HOSPITAL | Age: 62
LOS: 11 days | DRG: 870 | End: 2022-02-23
Attending: INTERNAL MEDICINE | Admitting: INTERNAL MEDICINE
Payer: COMMERCIAL

## 2022-01-01 ENCOUNTER — APPOINTMENT (INPATIENT)
Dept: GASTROENTEROLOGY | Facility: HOSPITAL | Age: 62
DRG: 870 | End: 2022-01-01
Attending: INTERNAL MEDICINE
Payer: COMMERCIAL

## 2022-01-01 ENCOUNTER — APPOINTMENT (EMERGENCY)
Dept: RADIOLOGY | Facility: HOSPITAL | Age: 62
DRG: 291 | End: 2022-01-01
Payer: COMMERCIAL

## 2022-01-01 ENCOUNTER — APPOINTMENT (INPATIENT)
Dept: CT IMAGING | Facility: HOSPITAL | Age: 62
DRG: 870 | End: 2022-01-01
Payer: COMMERCIAL

## 2022-01-01 ENCOUNTER — APPOINTMENT (INPATIENT)
Dept: NON INVASIVE DIAGNOSTICS | Facility: HOSPITAL | Age: 62
DRG: 870 | End: 2022-01-01
Payer: COMMERCIAL

## 2022-01-01 ENCOUNTER — HOSPITAL ENCOUNTER (INPATIENT)
Facility: HOSPITAL | Age: 62
LOS: 4 days | DRG: 291 | End: 2022-02-12
Attending: EMERGENCY MEDICINE | Admitting: FAMILY MEDICINE
Payer: COMMERCIAL

## 2022-01-01 ENCOUNTER — ANTICOAG VISIT (OUTPATIENT)
Dept: CARDIOLOGY CLINIC | Facility: CLINIC | Age: 62
End: 2022-01-01
Payer: COMMERCIAL

## 2022-01-01 ENCOUNTER — APPOINTMENT (INPATIENT)
Dept: RADIOLOGY | Facility: HOSPITAL | Age: 62
DRG: 291 | End: 2022-01-01
Attending: RADIOLOGY
Payer: COMMERCIAL

## 2022-01-01 ENCOUNTER — TELEPHONE (OUTPATIENT)
Dept: FAMILY MEDICINE CLINIC | Facility: CLINIC | Age: 62
End: 2022-01-01

## 2022-01-01 VITALS
HEIGHT: 71 IN | HEART RATE: 94 BPM | OXYGEN SATURATION: 87 % | WEIGHT: 315 LBS | DIASTOLIC BLOOD PRESSURE: 35 MMHG | TEMPERATURE: 99 F | SYSTOLIC BLOOD PRESSURE: 84 MMHG | BODY MASS INDEX: 44.1 KG/M2 | RESPIRATION RATE: 32 BRPM

## 2022-01-01 VITALS
RESPIRATION RATE: 26 BRPM | DIASTOLIC BLOOD PRESSURE: 73 MMHG | SYSTOLIC BLOOD PRESSURE: 115 MMHG | TEMPERATURE: 96.6 F | WEIGHT: 315 LBS | OXYGEN SATURATION: 94 % | HEIGHT: 72 IN | HEART RATE: 121 BPM | BODY MASS INDEX: 42.66 KG/M2

## 2022-01-01 DIAGNOSIS — N17.9 ACUTE ON CHRONIC RENAL FAILURE (HCC): ICD-10-CM

## 2022-01-01 DIAGNOSIS — J96.01 ACUTE RESPIRATORY FAILURE WITH HYPOXIA AND HYPERCAPNIA (HCC): ICD-10-CM

## 2022-01-01 DIAGNOSIS — I50.32 CHRONIC DIASTOLIC (CONGESTIVE) HEART FAILURE (HCC): ICD-10-CM

## 2022-01-01 DIAGNOSIS — Z79.01 CHRONIC ANTICOAGULATION: ICD-10-CM

## 2022-01-01 DIAGNOSIS — N18.9 ACUTE ON CHRONIC RENAL FAILURE (HCC): ICD-10-CM

## 2022-01-01 DIAGNOSIS — R29.6 MULTIPLE FALLS: ICD-10-CM

## 2022-01-01 DIAGNOSIS — Z79.01 LONG TERM (CURRENT) USE OF ANTICOAGULANTS: ICD-10-CM

## 2022-01-01 DIAGNOSIS — R18.8 ASCITES: ICD-10-CM

## 2022-01-01 DIAGNOSIS — I48.21 PERMANENT ATRIAL FIBRILLATION (HCC): Primary | ICD-10-CM

## 2022-01-01 DIAGNOSIS — R53.1 GENERALIZED WEAKNESS: ICD-10-CM

## 2022-01-01 DIAGNOSIS — I50.33 ACUTE ON CHRONIC DIASTOLIC (CONGESTIVE) HEART FAILURE (HCC): ICD-10-CM

## 2022-01-01 DIAGNOSIS — N17.9 ACUTE RENAL FAILURE SUPERIMPOSED ON STAGE 3B CHRONIC KIDNEY DISEASE (HCC): Primary | ICD-10-CM

## 2022-01-01 DIAGNOSIS — R57.9 SHOCK (HCC): ICD-10-CM

## 2022-01-01 DIAGNOSIS — R93.89 ABNORMAL CT OF THE CHEST: ICD-10-CM

## 2022-01-01 DIAGNOSIS — S30.1XXA ABDOMINAL WALL HEMATOMA: ICD-10-CM

## 2022-01-01 DIAGNOSIS — R60.1 GENERALIZED EDEMA: ICD-10-CM

## 2022-01-01 DIAGNOSIS — J96.02 ACUTE RESPIRATORY FAILURE WITH HYPOXIA AND HYPERCAPNIA (HCC): ICD-10-CM

## 2022-01-01 DIAGNOSIS — R33.9 URINARY RETENTION: ICD-10-CM

## 2022-01-01 DIAGNOSIS — I46.9 CARDIAC ARREST (HCC): ICD-10-CM

## 2022-01-01 DIAGNOSIS — N18.32 ACUTE RENAL FAILURE SUPERIMPOSED ON STAGE 3B CHRONIC KIDNEY DISEASE (HCC): Primary | ICD-10-CM

## 2022-01-01 DIAGNOSIS — E87.6 HYPOKALEMIA: Primary | ICD-10-CM

## 2022-01-01 LAB
2HR DELTA HS TROPONIN: -1 NG/L
4HR DELTA HS TROPONIN: 0 NG/L
ABO GROUP BLD BPU: NORMAL
ABO GROUP BLD: NORMAL
ABO GROUP BLD: NORMAL
ALBUMIN SERPL BCP-MCNC: 1.9 G/DL (ref 3.5–5)
ALBUMIN SERPL BCP-MCNC: 2.1 G/DL (ref 3.5–5)
ALBUMIN SERPL BCP-MCNC: 2.4 G/DL (ref 3.5–5)
ALBUMIN SERPL BCP-MCNC: 2.4 G/DL (ref 3.5–5)
ALBUMIN SERPL BCP-MCNC: 2.7 G/DL (ref 3.5–5)
ALP SERPL-CCNC: 119 U/L (ref 46–116)
ALP SERPL-CCNC: 119 U/L (ref 46–116)
ALP SERPL-CCNC: 125 U/L (ref 46–116)
ALP SERPL-CCNC: 143 U/L (ref 46–116)
ALP SERPL-CCNC: 84 U/L (ref 46–116)
ALT SERPL W P-5'-P-CCNC: 11 U/L (ref 12–78)
ALT SERPL W P-5'-P-CCNC: 11 U/L (ref 12–78)
ALT SERPL W P-5'-P-CCNC: 14 U/L (ref 12–78)
ALT SERPL W P-5'-P-CCNC: 15 U/L (ref 12–78)
ALT SERPL W P-5'-P-CCNC: 18 U/L (ref 12–78)
ANION GAP SERPL CALCULATED.3IONS-SCNC: 10 MMOL/L (ref 4–13)
ANION GAP SERPL CALCULATED.3IONS-SCNC: 11 MMOL/L (ref 4–13)
ANION GAP SERPL CALCULATED.3IONS-SCNC: 12 MMOL/L (ref 4–13)
ANION GAP SERPL CALCULATED.3IONS-SCNC: 13 MMOL/L (ref 4–13)
ANION GAP SERPL CALCULATED.3IONS-SCNC: 13 MMOL/L (ref 4–13)
ANION GAP SERPL CALCULATED.3IONS-SCNC: 2 MMOL/L (ref 4–13)
ANION GAP SERPL CALCULATED.3IONS-SCNC: 3 MMOL/L (ref 4–13)
ANION GAP SERPL CALCULATED.3IONS-SCNC: 4 MMOL/L (ref 4–13)
ANION GAP SERPL CALCULATED.3IONS-SCNC: 4 MMOL/L (ref 4–13)
ANION GAP SERPL CALCULATED.3IONS-SCNC: 5 MMOL/L (ref 4–13)
ANION GAP SERPL CALCULATED.3IONS-SCNC: 6 MMOL/L (ref 4–13)
ANION GAP SERPL CALCULATED.3IONS-SCNC: 7 MMOL/L (ref 4–13)
ANION GAP SERPL CALCULATED.3IONS-SCNC: 8 MMOL/L (ref 4–13)
ANION GAP SERPL CALCULATED.3IONS-SCNC: 8 MMOL/L (ref 4–13)
ANION GAP SERPL CALCULATED.3IONS-SCNC: 9 MMOL/L (ref 4–13)
ANISOCYTOSIS BLD QL SMEAR: PRESENT
AORTIC ROOT: 3 CM
APICAL FOUR CHAMBER EJECTION FRACTION: 43 %
APTT PPP: 52 SECONDS (ref 23–37)
APTT PPP: 66 SECONDS (ref 23–37)
ARTERIAL PATENCY WRIST A: NO
ARTERIAL PATENCY WRIST A: YES
ASCENDING AORTA: 3.2 CM (ref 2.47–3.7)
AST SERPL W P-5'-P-CCNC: 22 U/L (ref 5–45)
AST SERPL W P-5'-P-CCNC: 23 U/L (ref 5–45)
AST SERPL W P-5'-P-CCNC: 27 U/L (ref 5–45)
AST SERPL W P-5'-P-CCNC: 30 U/L (ref 5–45)
AST SERPL W P-5'-P-CCNC: 35 U/L (ref 5–45)
ATRIAL RATE: 102 BPM
ATRIAL RATE: 107 BPM
ATRIAL RATE: 117 BPM
ATRIAL RATE: 129 BPM
ATRIAL RATE: 77 BPM
AV PEAK GRADIENT: 15 MMHG
BACTERIA BLD CULT: NORMAL
BACTERIA BRONCH AEROBE CULT: ABNORMAL
BACTERIA BRONCH AEROBE CULT: ABNORMAL
BACTERIA SPT RESP CULT: ABNORMAL
BACTERIA UR QL AUTO: NORMAL /HPF
BASE EX.OXY STD BLDV CALC-SCNC: 58 % (ref 60–80)
BASE EX.OXY STD BLDV CALC-SCNC: 90.2 % (ref 60–80)
BASE EX.OXY STD BLDV CALC-SCNC: 93 % (ref 60–80)
BASE EXCESS BLDA CALC-SCNC: -0.3 MMOL/L
BASE EXCESS BLDA CALC-SCNC: -0.6 MMOL/L
BASE EXCESS BLDA CALC-SCNC: -1.2 MMOL/L
BASE EXCESS BLDA CALC-SCNC: -1.8 MMOL/L
BASE EXCESS BLDA CALC-SCNC: -1.8 MMOL/L
BASE EXCESS BLDA CALC-SCNC: -12.3 MMOL/L
BASE EXCESS BLDA CALC-SCNC: -2 MMOL/L
BASE EXCESS BLDA CALC-SCNC: -2 MMOL/L
BASE EXCESS BLDA CALC-SCNC: -2.2 MMOL/L
BASE EXCESS BLDA CALC-SCNC: -2.3 MMOL/L
BASE EXCESS BLDA CALC-SCNC: -2.5 MMOL/L
BASE EXCESS BLDA CALC-SCNC: -2.5 MMOL/L
BASE EXCESS BLDA CALC-SCNC: -2.6 MMOL/L
BASE EXCESS BLDA CALC-SCNC: -2.7 MMOL/L
BASE EXCESS BLDA CALC-SCNC: -2.8 MMOL/L
BASE EXCESS BLDA CALC-SCNC: -2.8 MMOL/L
BASE EXCESS BLDA CALC-SCNC: -3.2 MMOL/L
BASE EXCESS BLDA CALC-SCNC: -3.4 MMOL/L
BASE EXCESS BLDA CALC-SCNC: -3.4 MMOL/L
BASE EXCESS BLDA CALC-SCNC: -3.7 MMOL/L
BASE EXCESS BLDA CALC-SCNC: -3.7 MMOL/L
BASE EXCESS BLDA CALC-SCNC: -4 MMOL/L
BASE EXCESS BLDA CALC-SCNC: -4.1 MMOL/L
BASE EXCESS BLDA CALC-SCNC: -4.3 MMOL/L
BASE EXCESS BLDA CALC-SCNC: -5.3 MMOL/L
BASE EXCESS BLDA CALC-SCNC: -5.4 MMOL/L
BASE EXCESS BLDA CALC-SCNC: -5.8 MMOL/L
BASE EXCESS BLDA CALC-SCNC: -5.8 MMOL/L
BASE EXCESS BLDA CALC-SCNC: -6.3 MMOL/L
BASE EXCESS BLDA CALC-SCNC: -6.6 MMOL/L
BASE EXCESS BLDV CALC-SCNC: -0.3 MMOL/L
BASE EXCESS BLDV CALC-SCNC: -3.5 MMOL/L
BASE EXCESS BLDV CALC-SCNC: -4.4 MMOL/L
BASO STIPL BLD QL SMEAR: PRESENT
BASOPHILS # BLD AUTO: 0.04 THOUSANDS/ΜL (ref 0–0.1)
BASOPHILS # BLD AUTO: 0.05 THOUSANDS/ΜL (ref 0–0.1)
BASOPHILS # BLD AUTO: 0.06 THOUSANDS/ΜL (ref 0–0.1)
BASOPHILS # BLD MANUAL: 0 THOUSAND/UL (ref 0–0.1)
BASOPHILS # BLD MANUAL: 0.08 THOUSAND/UL (ref 0–0.1)
BASOPHILS NFR BLD AUTO: 0 % (ref 0–1)
BASOPHILS NFR BLD AUTO: 1 % (ref 0–1)
BASOPHILS NFR MAR MANUAL: 0 % (ref 0–1)
BASOPHILS NFR MAR MANUAL: 1 % (ref 0–1)
BILIRUB DIRECT SERPL-MCNC: 0.67 MG/DL (ref 0–0.2)
BILIRUB DIRECT SERPL-MCNC: 1.75 MG/DL (ref 0–0.2)
BILIRUB SERPL-MCNC: 0.82 MG/DL (ref 0.2–1)
BILIRUB SERPL-MCNC: 0.93 MG/DL (ref 0.2–1)
BILIRUB SERPL-MCNC: 1.54 MG/DL (ref 0.2–1)
BILIRUB SERPL-MCNC: 2.21 MG/DL (ref 0.2–1)
BILIRUB SERPL-MCNC: 2.4 MG/DL (ref 0.2–1)
BILIRUB UR QL STRIP: NEGATIVE
BLD GP AB SCN SERPL QL: NEGATIVE
BLD GP AB SCN SERPL QL: NEGATIVE
BODY TEMPERATURE: 95 DEGREES FEHRENHEIT
BODY TEMPERATURE: 95.8 DEGREES FEHRENHEIT
BODY TEMPERATURE: 95.9 DEGREES FEHRENHEIT
BODY TEMPERATURE: 96.2 DEGREES FEHRENHEIT
BODY TEMPERATURE: 97 DEGREES FEHRENHEIT
BODY TEMPERATURE: 97 DEGREES FEHRENHEIT
BODY TEMPERATURE: 97.9 DEGREES FEHRENHEIT
BODY TEMPERATURE: 98.1 DEGREES FEHRENHEIT
BODY TEMPERATURE: 98.2 DEGREES FEHRENHEIT
BODY TEMPERATURE: 98.4 DEGREES FEHRENHEIT
BODY TEMPERATURE: 98.6 DEGREES FEHRENHEIT
BODY TEMPERATURE: 98.8 DEGREES FEHRENHEIT
BODY TEMPERATURE: 99 DEGREES FEHRENHEIT
BODY TEMPERATURE: 99 DEGREES FEHRENHEIT
BODY TEMPERATURE: 99.5 DEGREES FEHRENHEIT
BPU ID: NORMAL
BUN SERPL-MCNC: 13 MG/DL (ref 5–25)
BUN SERPL-MCNC: 14 MG/DL (ref 5–25)
BUN SERPL-MCNC: 15 MG/DL (ref 5–25)
BUN SERPL-MCNC: 16 MG/DL (ref 5–25)
BUN SERPL-MCNC: 16 MG/DL (ref 5–25)
BUN SERPL-MCNC: 17 MG/DL (ref 5–25)
BUN SERPL-MCNC: 17 MG/DL (ref 5–25)
BUN SERPL-MCNC: 18 MG/DL (ref 5–25)
BUN SERPL-MCNC: 18 MG/DL (ref 5–25)
BUN SERPL-MCNC: 19 MG/DL (ref 5–25)
BUN SERPL-MCNC: 19 MG/DL (ref 5–25)
BUN SERPL-MCNC: 20 MG/DL (ref 5–25)
BUN SERPL-MCNC: 21 MG/DL (ref 5–25)
BUN SERPL-MCNC: 22 MG/DL (ref 5–25)
BUN SERPL-MCNC: 22 MG/DL (ref 5–25)
BUN SERPL-MCNC: 23 MG/DL (ref 5–25)
BUN SERPL-MCNC: 24 MG/DL (ref 5–25)
BUN SERPL-MCNC: 26 MG/DL (ref 5–25)
BUN SERPL-MCNC: 27 MG/DL (ref 5–25)
BUN SERPL-MCNC: 29 MG/DL (ref 5–25)
BUN SERPL-MCNC: 29 MG/DL (ref 5–25)
BUN SERPL-MCNC: 30 MG/DL (ref 5–25)
BUN SERPL-MCNC: 33 MG/DL (ref 5–25)
BUN SERPL-MCNC: 33 MG/DL (ref 5–25)
BUN SERPL-MCNC: 35 MG/DL (ref 5–25)
BUN SERPL-MCNC: 35 MG/DL (ref 5–25)
BUN SERPL-MCNC: 37 MG/DL (ref 5–25)
BUN SERPL-MCNC: 39 MG/DL (ref 5–25)
BUN SERPL-MCNC: 45 MG/DL (ref 5–25)
BUN SERPL-MCNC: 50 MG/DL (ref 5–25)
BUN SERPL-MCNC: 55 MG/DL (ref 5–25)
BUN SERPL-MCNC: 64 MG/DL (ref 5–25)
BUN SERPL-MCNC: 78 MG/DL (ref 5–25)
BUN SERPL-MCNC: 80 MG/DL (ref 5–25)
BUN SERPL-MCNC: 82 MG/DL (ref 5–25)
BUN SERPL-MCNC: 84 MG/DL (ref 5–25)
BUN SERPL-MCNC: 89 MG/DL (ref 5–25)
BUN SERPL-MCNC: 90 MG/DL (ref 5–25)
BUN SERPL-MCNC: 91 MG/DL (ref 5–25)
BUN SERPL-MCNC: 92 MG/DL (ref 5–25)
CA-I BLD-SCNC: 1.02 MMOL/L (ref 1.12–1.32)
CA-I BLD-SCNC: 1.05 MMOL/L (ref 1.12–1.32)
CA-I BLD-SCNC: 1.06 MMOL/L (ref 1.12–1.32)
CA-I BLD-SCNC: 1.09 MMOL/L (ref 1.12–1.32)
CA-I BLD-SCNC: 1.13 MMOL/L (ref 1.12–1.32)
CA-I BLD-SCNC: 1.14 MMOL/L (ref 1.12–1.32)
CA-I BLD-SCNC: 1.15 MMOL/L (ref 1.12–1.32)
CA-I BLD-SCNC: 1.16 MMOL/L (ref 1.12–1.32)
CA-I BLD-SCNC: 1.17 MMOL/L (ref 1.12–1.32)
CA-I BLD-SCNC: 1.18 MMOL/L (ref 1.12–1.32)
CA-I BLD-SCNC: 1.19 MMOL/L (ref 1.12–1.32)
CA-I BLD-SCNC: 1.2 MMOL/L (ref 1.12–1.32)
CA-I BLD-SCNC: 1.22 MMOL/L (ref 1.12–1.32)
CA-I BLD-SCNC: 1.23 MMOL/L (ref 1.12–1.32)
CA-I BLD-SCNC: 1.25 MMOL/L (ref 1.12–1.32)
CALCIUM ALBUM COR SERPL-MCNC: 10 MG/DL (ref 8.3–10.1)
CALCIUM ALBUM COR SERPL-MCNC: 10.7 MG/DL (ref 8.3–10.1)
CALCIUM ALBUM COR SERPL-MCNC: 9.7 MG/DL (ref 8.3–10.1)
CALCIUM SERPL-MCNC: 8 MG/DL (ref 8.3–10.1)
CALCIUM SERPL-MCNC: 8.1 MG/DL (ref 8.3–10.1)
CALCIUM SERPL-MCNC: 8.2 MG/DL (ref 8.3–10.1)
CALCIUM SERPL-MCNC: 8.3 MG/DL (ref 8.3–10.1)
CALCIUM SERPL-MCNC: 8.4 MG/DL (ref 8.3–10.1)
CALCIUM SERPL-MCNC: 8.4 MG/DL (ref 8.3–10.1)
CALCIUM SERPL-MCNC: 8.5 MG/DL (ref 8.3–10.1)
CALCIUM SERPL-MCNC: 8.6 MG/DL (ref 8.3–10.1)
CALCIUM SERPL-MCNC: 8.7 MG/DL (ref 8.3–10.1)
CALCIUM SERPL-MCNC: 8.8 MG/DL (ref 8.3–10.1)
CALCIUM SERPL-MCNC: 8.9 MG/DL (ref 8.3–10.1)
CALCIUM SERPL-MCNC: 9 MG/DL (ref 8.3–10.1)
CALCIUM SERPL-MCNC: 9.1 MG/DL (ref 8.3–10.1)
CALCIUM SERPL-MCNC: 9.1 MG/DL (ref 8.3–10.1)
CALCIUM SERPL-MCNC: 9.2 MG/DL (ref 8.3–10.1)
CALCIUM SERPL-MCNC: 9.3 MG/DL (ref 8.3–10.1)
CALCIUM SERPL-MCNC: 9.7 MG/DL (ref 8.3–10.1)
CARDIAC TROPONIN I PNL SERPL HS: 10 NG/L
CARDIAC TROPONIN I PNL SERPL HS: 11 NG/L
CARDIAC TROPONIN I PNL SERPL HS: 11 NG/L
CHLORIDE SERPL-SCNC: 101 MMOL/L (ref 100–108)
CHLORIDE SERPL-SCNC: 102 MMOL/L (ref 100–108)
CHLORIDE SERPL-SCNC: 103 MMOL/L (ref 100–108)
CHLORIDE SERPL-SCNC: 104 MMOL/L (ref 100–108)
CHLORIDE SERPL-SCNC: 105 MMOL/L (ref 100–108)
CHLORIDE SERPL-SCNC: 106 MMOL/L (ref 100–108)
CHLORIDE SERPL-SCNC: 107 MMOL/L (ref 100–108)
CHLORIDE SERPL-SCNC: 87 MMOL/L (ref 100–108)
CHLORIDE SERPL-SCNC: 89 MMOL/L (ref 100–108)
CHLORIDE SERPL-SCNC: 90 MMOL/L (ref 100–108)
CHLORIDE SERPL-SCNC: 90 MMOL/L (ref 100–108)
CHLORIDE SERPL-SCNC: 92 MMOL/L (ref 100–108)
CHLORIDE SERPL-SCNC: 93 MMOL/L (ref 100–108)
CHLORIDE SERPL-SCNC: 93 MMOL/L (ref 100–108)
CHLORIDE SERPL-SCNC: 94 MMOL/L (ref 100–108)
CHLORIDE SERPL-SCNC: 95 MMOL/L (ref 100–108)
CHLORIDE SERPL-SCNC: 97 MMOL/L (ref 100–108)
CHLORIDE SERPL-SCNC: 97 MMOL/L (ref 100–108)
CHLORIDE SERPL-SCNC: 98 MMOL/L (ref 100–108)
CHLORIDE SERPL-SCNC: 99 MMOL/L (ref 100–108)
CLARITY UR: CLEAR
CO2 SERPL-SCNC: 22 MMOL/L (ref 21–32)
CO2 SERPL-SCNC: 22 MMOL/L (ref 21–32)
CO2 SERPL-SCNC: 23 MMOL/L (ref 21–32)
CO2 SERPL-SCNC: 24 MMOL/L (ref 21–32)
CO2 SERPL-SCNC: 25 MMOL/L (ref 21–32)
CO2 SERPL-SCNC: 26 MMOL/L (ref 21–32)
CO2 SERPL-SCNC: 27 MMOL/L (ref 21–32)
CO2 SERPL-SCNC: 29 MMOL/L (ref 21–32)
CO2 SERPL-SCNC: 29 MMOL/L (ref 21–32)
CO2 SERPL-SCNC: 30 MMOL/L (ref 21–32)
CO2 SERPL-SCNC: 31 MMOL/L (ref 21–32)
CO2 SERPL-SCNC: 32 MMOL/L (ref 21–32)
CO2 SERPL-SCNC: 33 MMOL/L (ref 21–32)
CO2 SERPL-SCNC: 34 MMOL/L (ref 21–32)
CO2 SERPL-SCNC: 35 MMOL/L (ref 21–32)
CO2 SERPL-SCNC: 37 MMOL/L (ref 21–32)
COLOR UR: YELLOW
CORTIS SERPL-MCNC: 21 UG/DL
CORTIS SERPL-MCNC: 29.9 UG/DL
CREAT SERPL-MCNC: 0.82 MG/DL (ref 0.6–1.3)
CREAT SERPL-MCNC: 0.83 MG/DL (ref 0.6–1.3)
CREAT SERPL-MCNC: 0.83 MG/DL (ref 0.6–1.3)
CREAT SERPL-MCNC: 0.88 MG/DL (ref 0.6–1.3)
CREAT SERPL-MCNC: 0.88 MG/DL (ref 0.6–1.3)
CREAT SERPL-MCNC: 0.9 MG/DL (ref 0.6–1.3)
CREAT SERPL-MCNC: 0.9 MG/DL (ref 0.6–1.3)
CREAT SERPL-MCNC: 0.91 MG/DL (ref 0.6–1.3)
CREAT SERPL-MCNC: 0.95 MG/DL (ref 0.6–1.3)
CREAT SERPL-MCNC: 0.99 MG/DL (ref 0.6–1.3)
CREAT SERPL-MCNC: 1.06 MG/DL (ref 0.6–1.3)
CREAT SERPL-MCNC: 1.09 MG/DL (ref 0.6–1.3)
CREAT SERPL-MCNC: 1.13 MG/DL (ref 0.6–1.3)
CREAT SERPL-MCNC: 1.15 MG/DL (ref 0.6–1.3)
CREAT SERPL-MCNC: 1.17 MG/DL (ref 0.6–1.3)
CREAT SERPL-MCNC: 1.18 MG/DL (ref 0.6–1.3)
CREAT SERPL-MCNC: 1.27 MG/DL (ref 0.6–1.3)
CREAT SERPL-MCNC: 1.3 MG/DL (ref 0.6–1.3)
CREAT SERPL-MCNC: 1.31 MG/DL (ref 0.6–1.3)
CREAT SERPL-MCNC: 1.33 MG/DL (ref 0.6–1.3)
CREAT SERPL-MCNC: 1.38 MG/DL (ref 0.6–1.3)
CREAT SERPL-MCNC: 1.43 MG/DL (ref 0.6–1.3)
CREAT SERPL-MCNC: 1.52 MG/DL (ref 0.6–1.3)
CREAT SERPL-MCNC: 1.58 MG/DL (ref 0.6–1.3)
CREAT SERPL-MCNC: 1.64 MG/DL (ref 0.6–1.3)
CREAT SERPL-MCNC: 1.7 MG/DL (ref 0.6–1.3)
CREAT SERPL-MCNC: 1.7 MG/DL (ref 0.6–1.3)
CREAT SERPL-MCNC: 1.74 MG/DL (ref 0.6–1.3)
CREAT SERPL-MCNC: 1.77 MG/DL (ref 0.6–1.3)
CREAT SERPL-MCNC: 1.78 MG/DL (ref 0.6–1.3)
CREAT SERPL-MCNC: 1.82 MG/DL (ref 0.6–1.3)
CREAT SERPL-MCNC: 1.83 MG/DL (ref 0.6–1.3)
CREAT SERPL-MCNC: 1.85 MG/DL (ref 0.6–1.3)
CREAT SERPL-MCNC: 1.87 MG/DL (ref 0.6–1.3)
CREAT SERPL-MCNC: 1.91 MG/DL (ref 0.6–1.3)
CREAT SERPL-MCNC: 1.94 MG/DL (ref 0.6–1.3)
CREAT SERPL-MCNC: 2.03 MG/DL (ref 0.6–1.3)
CREAT SERPL-MCNC: 2.14 MG/DL (ref 0.6–1.3)
CREAT SERPL-MCNC: 2.33 MG/DL (ref 0.6–1.3)
CREAT SERPL-MCNC: 2.43 MG/DL (ref 0.6–1.3)
CREAT SERPL-MCNC: 2.64 MG/DL (ref 0.6–1.3)
CREAT SERPL-MCNC: 2.88 MG/DL (ref 0.6–1.3)
CREAT SERPL-MCNC: 3.21 MG/DL (ref 0.6–1.3)
CREAT SERPL-MCNC: 3.26 MG/DL (ref 0.6–1.3)
CREAT SERPL-MCNC: 3.3 MG/DL (ref 0.6–1.3)
CREAT SERPL-MCNC: 3.32 MG/DL (ref 0.6–1.3)
CREAT UR-MCNC: 160 MG/DL
CROSSMATCH: NORMAL
DIGOXIN SERPL-MCNC: 1 NG/ML (ref 0.8–2)
DIGOXIN SERPL-MCNC: 1.1 NG/ML (ref 0.8–2)
DIGOXIN SERPL-MCNC: 1.6 NG/ML (ref 0.8–2)
EOSINOPHIL # BLD AUTO: 0.15 THOUSAND/ΜL (ref 0–0.61)
EOSINOPHIL # BLD AUTO: 0.22 THOUSAND/ΜL (ref 0–0.61)
EOSINOPHIL # BLD AUTO: 0.24 THOUSAND/ΜL (ref 0–0.61)
EOSINOPHIL # BLD AUTO: 0.25 THOUSAND/ΜL (ref 0–0.61)
EOSINOPHIL # BLD AUTO: 0.25 THOUSAND/ΜL (ref 0–0.61)
EOSINOPHIL # BLD AUTO: 0.3 THOUSAND/ΜL (ref 0–0.61)
EOSINOPHIL # BLD AUTO: 0.32 THOUSAND/ΜL (ref 0–0.61)
EOSINOPHIL # BLD AUTO: 0.38 THOUSAND/ΜL (ref 0–0.61)
EOSINOPHIL # BLD MANUAL: 0 THOUSAND/UL (ref 0–0.4)
EOSINOPHIL # BLD MANUAL: 0.17 THOUSAND/UL (ref 0–0.4)
EOSINOPHIL # BLD MANUAL: 0.94 THOUSAND/UL (ref 0–0.4)
EOSINOPHIL # BLD MANUAL: 1.16 THOUSAND/UL (ref 0–0.4)
EOSINOPHIL NFR BLD AUTO: 1 % (ref 0–6)
EOSINOPHIL NFR BLD AUTO: 4 % (ref 0–6)
EOSINOPHIL NFR BLD AUTO: 5 % (ref 0–6)
EOSINOPHIL NFR BLD AUTO: 6 % (ref 0–6)
EOSINOPHIL NFR BLD MANUAL: 0 % (ref 0–6)
EOSINOPHIL NFR BLD MANUAL: 2 % (ref 0–6)
EOSINOPHIL NFR BLD MANUAL: 3 % (ref 0–6)
EOSINOPHIL NFR BLD MANUAL: 4 % (ref 0–6)
ERYTHROCYTE [DISTWIDTH] IN BLOOD BY AUTOMATED COUNT: 17.1 % (ref 11.6–15.1)
ERYTHROCYTE [DISTWIDTH] IN BLOOD BY AUTOMATED COUNT: 17.2 % (ref 11.6–15.1)
ERYTHROCYTE [DISTWIDTH] IN BLOOD BY AUTOMATED COUNT: 17.3 % (ref 11.6–15.1)
ERYTHROCYTE [DISTWIDTH] IN BLOOD BY AUTOMATED COUNT: 17.3 % (ref 11.6–15.1)
ERYTHROCYTE [DISTWIDTH] IN BLOOD BY AUTOMATED COUNT: 17.4 % (ref 11.6–15.1)
ERYTHROCYTE [DISTWIDTH] IN BLOOD BY AUTOMATED COUNT: 17.7 % (ref 11.6–15.1)
ERYTHROCYTE [DISTWIDTH] IN BLOOD BY AUTOMATED COUNT: 17.8 % (ref 11.6–15.1)
ERYTHROCYTE [DISTWIDTH] IN BLOOD BY AUTOMATED COUNT: 17.9 % (ref 11.6–15.1)
ERYTHROCYTE [DISTWIDTH] IN BLOOD BY AUTOMATED COUNT: 18.5 % (ref 11.6–15.1)
ERYTHROCYTE [DISTWIDTH] IN BLOOD BY AUTOMATED COUNT: 18.5 % (ref 11.6–15.1)
ERYTHROCYTE [DISTWIDTH] IN BLOOD BY AUTOMATED COUNT: 19.4 % (ref 11.6–15.1)
ERYTHROCYTE [DISTWIDTH] IN BLOOD BY AUTOMATED COUNT: 20.7 % (ref 11.6–15.1)
ERYTHROCYTE [DISTWIDTH] IN BLOOD BY AUTOMATED COUNT: 20.7 % (ref 11.6–15.1)
ERYTHROCYTE [DISTWIDTH] IN BLOOD BY AUTOMATED COUNT: 21.2 % (ref 11.6–15.1)
ERYTHROCYTE [DISTWIDTH] IN BLOOD BY AUTOMATED COUNT: 21.2 % (ref 11.6–15.1)
ERYTHROCYTE [DISTWIDTH] IN BLOOD BY AUTOMATED COUNT: 21.4 % (ref 11.6–15.1)
ERYTHROCYTE [DISTWIDTH] IN BLOOD BY AUTOMATED COUNT: 21.5 % (ref 11.6–15.1)
ERYTHROCYTE [DISTWIDTH] IN BLOOD BY AUTOMATED COUNT: 21.9 % (ref 11.6–15.1)
ERYTHROCYTE [DISTWIDTH] IN BLOOD BY AUTOMATED COUNT: 22.3 % (ref 11.6–15.1)
FLUAV RNA RESP QL NAA+PROBE: NEGATIVE
FLUBV RNA RESP QL NAA+PROBE: NEGATIVE
FRACTIONAL SHORTENING: 29 % (ref 28–44)
GFR SERPL CREATININE-BSD FRML MDRD: 18 ML/MIN/1.73SQ M
GFR SERPL CREATININE-BSD FRML MDRD: 18 ML/MIN/1.73SQ M
GFR SERPL CREATININE-BSD FRML MDRD: 19 ML/MIN/1.73SQ M
GFR SERPL CREATININE-BSD FRML MDRD: 19 ML/MIN/1.73SQ M
GFR SERPL CREATININE-BSD FRML MDRD: 22 ML/MIN/1.73SQ M
GFR SERPL CREATININE-BSD FRML MDRD: 24 ML/MIN/1.73SQ M
GFR SERPL CREATININE-BSD FRML MDRD: 27 ML/MIN/1.73SQ M
GFR SERPL CREATININE-BSD FRML MDRD: 28 ML/MIN/1.73SQ M
GFR SERPL CREATININE-BSD FRML MDRD: 31 ML/MIN/1.73SQ M
GFR SERPL CREATININE-BSD FRML MDRD: 33 ML/MIN/1.73SQ M
GFR SERPL CREATININE-BSD FRML MDRD: 35 ML/MIN/1.73SQ M
GFR SERPL CREATININE-BSD FRML MDRD: 36 ML/MIN/1.73SQ M
GFR SERPL CREATININE-BSD FRML MDRD: 37 ML/MIN/1.73SQ M
GFR SERPL CREATININE-BSD FRML MDRD: 37 ML/MIN/1.73SQ M
GFR SERPL CREATININE-BSD FRML MDRD: 38 ML/MIN/1.73SQ M
GFR SERPL CREATININE-BSD FRML MDRD: 38 ML/MIN/1.73SQ M
GFR SERPL CREATININE-BSD FRML MDRD: 39 ML/MIN/1.73SQ M
GFR SERPL CREATININE-BSD FRML MDRD: 39 ML/MIN/1.73SQ M
GFR SERPL CREATININE-BSD FRML MDRD: 40 ML/MIN/1.73SQ M
GFR SERPL CREATININE-BSD FRML MDRD: 41 ML/MIN/1.73SQ M
GFR SERPL CREATININE-BSD FRML MDRD: 41 ML/MIN/1.73SQ M
GFR SERPL CREATININE-BSD FRML MDRD: 43 ML/MIN/1.73SQ M
GFR SERPL CREATININE-BSD FRML MDRD: 45 ML/MIN/1.73SQ M
GFR SERPL CREATININE-BSD FRML MDRD: 48 ML/MIN/1.73SQ M
GFR SERPL CREATININE-BSD FRML MDRD: 51 ML/MIN/1.73SQ M
GFR SERPL CREATININE-BSD FRML MDRD: 54 ML/MIN/1.73SQ M
GFR SERPL CREATININE-BSD FRML MDRD: 56 ML/MIN/1.73SQ M
GFR SERPL CREATININE-BSD FRML MDRD: 57 ML/MIN/1.73SQ M
GFR SERPL CREATININE-BSD FRML MDRD: 58 ML/MIN/1.73SQ M
GFR SERPL CREATININE-BSD FRML MDRD: 59 ML/MIN/1.73SQ M
GFR SERPL CREATININE-BSD FRML MDRD: 65 ML/MIN/1.73SQ M
GFR SERPL CREATININE-BSD FRML MDRD: 65 ML/MIN/1.73SQ M
GFR SERPL CREATININE-BSD FRML MDRD: 67 ML/MIN/1.73SQ M
GFR SERPL CREATININE-BSD FRML MDRD: 68 ML/MIN/1.73SQ M
GFR SERPL CREATININE-BSD FRML MDRD: 71 ML/MIN/1.73SQ M
GFR SERPL CREATININE-BSD FRML MDRD: 74 ML/MIN/1.73SQ M
GFR SERPL CREATININE-BSD FRML MDRD: 80 ML/MIN/1.73SQ M
GFR SERPL CREATININE-BSD FRML MDRD: 84 ML/MIN/1.73SQ M
GFR SERPL CREATININE-BSD FRML MDRD: 89 ML/MIN/1.73SQ M
GFR SERPL CREATININE-BSD FRML MDRD: 90 ML/MIN/1.73SQ M
GFR SERPL CREATININE-BSD FRML MDRD: 90 ML/MIN/1.73SQ M
GFR SERPL CREATININE-BSD FRML MDRD: 91 ML/MIN/1.73SQ M
GFR SERPL CREATININE-BSD FRML MDRD: 91 ML/MIN/1.73SQ M
GFR SERPL CREATININE-BSD FRML MDRD: 93 ML/MIN/1.73SQ M
GFR SERPL CREATININE-BSD FRML MDRD: 93 ML/MIN/1.73SQ M
GFR SERPL CREATININE-BSD FRML MDRD: 94 ML/MIN/1.73SQ M
GLUCOSE SERPL-MCNC: 100 MG/DL (ref 65–140)
GLUCOSE SERPL-MCNC: 100 MG/DL (ref 65–140)
GLUCOSE SERPL-MCNC: 101 MG/DL (ref 65–140)
GLUCOSE SERPL-MCNC: 102 MG/DL (ref 65–140)
GLUCOSE SERPL-MCNC: 103 MG/DL (ref 65–140)
GLUCOSE SERPL-MCNC: 104 MG/DL (ref 65–140)
GLUCOSE SERPL-MCNC: 105 MG/DL (ref 65–140)
GLUCOSE SERPL-MCNC: 106 MG/DL (ref 65–140)
GLUCOSE SERPL-MCNC: 107 MG/DL (ref 65–140)
GLUCOSE SERPL-MCNC: 107 MG/DL (ref 65–140)
GLUCOSE SERPL-MCNC: 109 MG/DL (ref 65–140)
GLUCOSE SERPL-MCNC: 109 MG/DL (ref 65–140)
GLUCOSE SERPL-MCNC: 110 MG/DL (ref 65–140)
GLUCOSE SERPL-MCNC: 111 MG/DL (ref 65–140)
GLUCOSE SERPL-MCNC: 112 MG/DL (ref 65–140)
GLUCOSE SERPL-MCNC: 116 MG/DL (ref 65–140)
GLUCOSE SERPL-MCNC: 118 MG/DL (ref 65–140)
GLUCOSE SERPL-MCNC: 122 MG/DL (ref 65–140)
GLUCOSE SERPL-MCNC: 124 MG/DL (ref 65–140)
GLUCOSE SERPL-MCNC: 128 MG/DL (ref 65–140)
GLUCOSE SERPL-MCNC: 136 MG/DL (ref 65–140)
GLUCOSE SERPL-MCNC: 138 MG/DL (ref 65–140)
GLUCOSE SERPL-MCNC: 139 MG/DL (ref 65–140)
GLUCOSE SERPL-MCNC: 141 MG/DL (ref 65–140)
GLUCOSE SERPL-MCNC: 149 MG/DL (ref 65–140)
GLUCOSE SERPL-MCNC: 153 MG/DL (ref 65–140)
GLUCOSE SERPL-MCNC: 73 MG/DL (ref 65–140)
GLUCOSE SERPL-MCNC: 75 MG/DL (ref 65–140)
GLUCOSE SERPL-MCNC: 77 MG/DL (ref 65–140)
GLUCOSE SERPL-MCNC: 77 MG/DL (ref 65–140)
GLUCOSE SERPL-MCNC: 79 MG/DL (ref 65–140)
GLUCOSE SERPL-MCNC: 81 MG/DL (ref 65–140)
GLUCOSE SERPL-MCNC: 81 MG/DL (ref 65–140)
GLUCOSE SERPL-MCNC: 82 MG/DL (ref 65–140)
GLUCOSE SERPL-MCNC: 83 MG/DL (ref 65–140)
GLUCOSE SERPL-MCNC: 83 MG/DL (ref 65–140)
GLUCOSE SERPL-MCNC: 85 MG/DL (ref 65–140)
GLUCOSE SERPL-MCNC: 85 MG/DL (ref 65–140)
GLUCOSE SERPL-MCNC: 86 MG/DL (ref 65–140)
GLUCOSE SERPL-MCNC: 87 MG/DL (ref 65–140)
GLUCOSE SERPL-MCNC: 88 MG/DL (ref 65–140)
GLUCOSE SERPL-MCNC: 89 MG/DL (ref 65–140)
GLUCOSE SERPL-MCNC: 90 MG/DL (ref 65–140)
GLUCOSE SERPL-MCNC: 91 MG/DL (ref 65–140)
GLUCOSE SERPL-MCNC: 92 MG/DL (ref 65–140)
GLUCOSE SERPL-MCNC: 93 MG/DL (ref 65–140)
GLUCOSE SERPL-MCNC: 93 MG/DL (ref 65–140)
GLUCOSE SERPL-MCNC: 94 MG/DL (ref 65–140)
GLUCOSE SERPL-MCNC: 94 MG/DL (ref 65–140)
GLUCOSE SERPL-MCNC: 95 MG/DL (ref 65–140)
GLUCOSE SERPL-MCNC: 95 MG/DL (ref 65–140)
GLUCOSE SERPL-MCNC: 96 MG/DL (ref 65–140)
GLUCOSE SERPL-MCNC: 96 MG/DL (ref 65–140)
GLUCOSE SERPL-MCNC: 99 MG/DL (ref 65–140)
GLUCOSE SERPL-MCNC: 99 MG/DL (ref 65–140)
GLUCOSE UR STRIP-MCNC: NEGATIVE MG/DL
GRAM STN SPEC: ABNORMAL
HCO3 BLDA-SCNC: 14.8 MMOL/L (ref 22–28)
HCO3 BLDA-SCNC: 20.9 MMOL/L (ref 22–28)
HCO3 BLDA-SCNC: 21.5 MMOL/L (ref 22–28)
HCO3 BLDA-SCNC: 22 MMOL/L (ref 22–28)
HCO3 BLDA-SCNC: 22.3 MMOL/L (ref 22–28)
HCO3 BLDA-SCNC: 22.8 MMOL/L (ref 22–28)
HCO3 BLDA-SCNC: 23.4 MMOL/L (ref 22–28)
HCO3 BLDA-SCNC: 23.4 MMOL/L (ref 22–28)
HCO3 BLDA-SCNC: 23.5 MMOL/L (ref 22–28)
HCO3 BLDA-SCNC: 23.6 MMOL/L (ref 22–28)
HCO3 BLDA-SCNC: 23.7 MMOL/L (ref 22–28)
HCO3 BLDA-SCNC: 23.9 MMOL/L (ref 22–28)
HCO3 BLDA-SCNC: 23.9 MMOL/L (ref 22–28)
HCO3 BLDA-SCNC: 24.1 MMOL/L (ref 22–28)
HCO3 BLDA-SCNC: 24.2 MMOL/L (ref 22–28)
HCO3 BLDA-SCNC: 24.2 MMOL/L (ref 22–28)
HCO3 BLDA-SCNC: 24.6 MMOL/L (ref 22–28)
HCO3 BLDA-SCNC: 24.7 MMOL/L (ref 22–28)
HCO3 BLDA-SCNC: 25.3 MMOL/L (ref 22–28)
HCO3 BLDA-SCNC: 25.4 MMOL/L (ref 22–28)
HCO3 BLDA-SCNC: 25.6 MMOL/L (ref 22–28)
HCO3 BLDA-SCNC: 25.8 MMOL/L (ref 22–28)
HCO3 BLDA-SCNC: 26.2 MMOL/L (ref 22–28)
HCO3 BLDA-SCNC: 26.2 MMOL/L (ref 22–28)
HCO3 BLDA-SCNC: 27.4 MMOL/L (ref 22–28)
HCO3 BLDA-SCNC: 28.9 MMOL/L (ref 22–28)
HCO3 BLDA-SCNC: 29.7 MMOL/L (ref 22–28)
HCO3 BLDA-SCNC: 30.4 MMOL/L (ref 22–28)
HCO3 BLDV-SCNC: 22 MMOL/L (ref 24–30)
HCO3 BLDV-SCNC: 22.5 MMOL/L (ref 24–30)
HCO3 BLDV-SCNC: 30.5 MMOL/L (ref 24–30)
HCT VFR BLD AUTO: 19.9 % (ref 36.5–49.3)
HCT VFR BLD AUTO: 21.4 % (ref 36.5–49.3)
HCT VFR BLD AUTO: 21.4 % (ref 36.5–49.3)
HCT VFR BLD AUTO: 22.2 % (ref 36.5–49.3)
HCT VFR BLD AUTO: 22.4 % (ref 36.5–49.3)
HCT VFR BLD AUTO: 22.8 % (ref 36.5–49.3)
HCT VFR BLD AUTO: 23.1 % (ref 36.5–49.3)
HCT VFR BLD AUTO: 23.2 % (ref 36.5–49.3)
HCT VFR BLD AUTO: 23.2 % (ref 36.5–49.3)
HCT VFR BLD AUTO: 23.6 % (ref 36.5–49.3)
HCT VFR BLD AUTO: 23.7 % (ref 36.5–49.3)
HCT VFR BLD AUTO: 23.8 % (ref 36.5–49.3)
HCT VFR BLD AUTO: 23.9 % (ref 36.5–49.3)
HCT VFR BLD AUTO: 23.9 % (ref 36.5–49.3)
HCT VFR BLD AUTO: 24 % (ref 36.5–49.3)
HCT VFR BLD AUTO: 24.3 % (ref 36.5–49.3)
HCT VFR BLD AUTO: 24.4 % (ref 36.5–49.3)
HCT VFR BLD AUTO: 24.4 % (ref 36.5–49.3)
HCT VFR BLD AUTO: 24.5 % (ref 36.5–49.3)
HCT VFR BLD AUTO: 24.6 % (ref 36.5–49.3)
HCT VFR BLD AUTO: 24.7 % (ref 36.5–49.3)
HCT VFR BLD AUTO: 24.7 % (ref 36.5–49.3)
HCT VFR BLD AUTO: 25 % (ref 36.5–49.3)
HCT VFR BLD AUTO: 25.2 % (ref 36.5–49.3)
HCT VFR BLD AUTO: 25.5 % (ref 36.5–49.3)
HCT VFR BLD AUTO: 27.5 % (ref 36.5–49.3)
HCT VFR BLD AUTO: 28.2 % (ref 36.5–49.3)
HGB BLD-MCNC: 6.2 G/DL (ref 12–17)
HGB BLD-MCNC: 6.3 G/DL (ref 12–17)
HGB BLD-MCNC: 6.6 G/DL (ref 12–17)
HGB BLD-MCNC: 6.6 G/DL (ref 12–17)
HGB BLD-MCNC: 6.7 G/DL (ref 12–17)
HGB BLD-MCNC: 6.8 G/DL (ref 12–17)
HGB BLD-MCNC: 7 G/DL (ref 12–17)
HGB BLD-MCNC: 7.1 G/DL (ref 12–17)
HGB BLD-MCNC: 7.2 G/DL (ref 12–17)
HGB BLD-MCNC: 7.3 G/DL (ref 12–17)
HGB BLD-MCNC: 7.3 G/DL (ref 12–17)
HGB BLD-MCNC: 7.4 G/DL (ref 12–17)
HGB BLD-MCNC: 7.5 G/DL (ref 12–17)
HGB BLD-MCNC: 7.5 G/DL (ref 12–17)
HGB BLD-MCNC: 7.6 G/DL (ref 12–17)
HGB BLD-MCNC: 7.6 G/DL (ref 12–17)
HGB BLD-MCNC: 7.7 G/DL (ref 12–17)
HGB BLD-MCNC: 7.8 G/DL (ref 12–17)
HGB BLD-MCNC: 8.1 G/DL (ref 12–17)
HGB UR QL STRIP.AUTO: ABNORMAL
HOROWITZ INDEX BLDA+IHG-RTO: 100 MM[HG]
HOROWITZ INDEX BLDA+IHG-RTO: 5 MM[HG]
HOROWITZ INDEX BLDA+IHG-RTO: 60 MM[HG]
HOROWITZ INDEX BLDA+IHG-RTO: 70 MM[HG]
HOROWITZ INDEX BLDA+IHG-RTO: 70 MM[HG]
HOROWITZ INDEX BLDA+IHG-RTO: 80 MM[HG]
HYPERCHROMIA BLD QL SMEAR: PRESENT
IMM GRANULOCYTES # BLD AUTO: 0.03 THOUSAND/UL (ref 0–0.2)
IMM GRANULOCYTES # BLD AUTO: 0.04 THOUSAND/UL (ref 0–0.2)
IMM GRANULOCYTES # BLD AUTO: 0.04 THOUSAND/UL (ref 0–0.2)
IMM GRANULOCYTES # BLD AUTO: 0.08 THOUSAND/UL (ref 0–0.2)
IMM GRANULOCYTES # BLD AUTO: 0.16 THOUSAND/UL (ref 0–0.2)
IMM GRANULOCYTES NFR BLD AUTO: 1 % (ref 0–2)
INR PPP: 1.22 (ref 0.84–1.19)
INR PPP: 1.23 (ref 0.84–1.19)
INR PPP: 1.4 (ref 0.84–1.19)
INR PPP: 1.53 (ref 0.84–1.19)
INR PPP: 1.63 (ref 0.84–1.19)
INR PPP: 2.02 (ref 0.84–1.19)
INTERVENTRICULAR SEPTUM IN DIASTOLE (PARASTERNAL SHORT AXIS VIEW): 0.7 CM (ref 0.62–1.17)
INTERVENTRICULAR SEPTUM: 0.7 CM (ref 0.6–1.1)
IVC: 3.5 MM
KETONES UR STRIP-MCNC: NEGATIVE MG/DL
L PNEUMO1 AG UR QL IA.RAPID: NEGATIVE
LAAS-AP4: 34.6 CM2
LACTATE SERPL-SCNC: 1.7 MMOL/L (ref 0.5–2)
LACTATE SERPL-SCNC: 1.9 MMOL/L (ref 0.5–2)
LACTATE SERPL-SCNC: 2.2 MMOL/L (ref 0.5–2)
LACTATE SERPL-SCNC: 2.3 MMOL/L (ref 0.5–2)
LACTATE SERPL-SCNC: 2.6 MMOL/L (ref 0.5–2)
LACTATE SERPL-SCNC: 2.8 MMOL/L (ref 0.5–2)
LACTATE SERPL-SCNC: 3.8 MMOL/L (ref 0.5–2)
LACTATE SERPL-SCNC: 4.1 MMOL/L (ref 0.5–2)
LACTATE SERPL-SCNC: 5 MMOL/L (ref 0.5–2)
LACTATE SERPL-SCNC: 5.1 MMOL/L (ref 0.5–2)
LACTATE SERPL-SCNC: 5.1 MMOL/L (ref 0.5–2)
LEFT ATRIUM SIZE: 4.7 CM
LEFT INTERNAL DIMENSION IN SYSTOLE: 4.5 CM (ref 2.1–4)
LEFT VENTRICULAR INTERNAL DIMENSION IN DIASTOLE: 6.3 CM (ref 64.47–96.16)
LEFT VENTRICULAR POSTERIOR WALL IN END DIASTOLE: 1.1 CM (ref 0.61–1.15)
LEFT VENTRICULAR STROKE VOLUME: 112 ML
LEUKOCYTE ESTERASE UR QL STRIP: NEGATIVE
LVSV (TEICH): 112 ML
LYMPHOCYTES # BLD AUTO: 0.32 THOUSANDS/ΜL (ref 0.6–4.47)
LYMPHOCYTES # BLD AUTO: 0.38 THOUSANDS/ΜL (ref 0.6–4.47)
LYMPHOCYTES # BLD AUTO: 0.42 THOUSANDS/ΜL (ref 0.6–4.47)
LYMPHOCYTES # BLD AUTO: 0.43 THOUSANDS/ΜL (ref 0.6–4.47)
LYMPHOCYTES # BLD AUTO: 0.45 THOUSANDS/ΜL (ref 0.6–4.47)
LYMPHOCYTES # BLD AUTO: 0.5 THOUSAND/UL (ref 0.6–4.47)
LYMPHOCYTES # BLD AUTO: 0.5 THOUSANDS/ΜL (ref 0.6–4.47)
LYMPHOCYTES # BLD AUTO: 0.53 THOUSANDS/ΜL (ref 0.6–4.47)
LYMPHOCYTES # BLD AUTO: 0.6 THOUSAND/UL (ref 0.6–4.47)
LYMPHOCYTES # BLD AUTO: 0.92 THOUSANDS/ΜL (ref 0.6–4.47)
LYMPHOCYTES # BLD AUTO: 1.88 THOUSAND/UL (ref 0.6–4.47)
LYMPHOCYTES # BLD AUTO: 2 % (ref 14–44)
LYMPHOCYTES # BLD AUTO: 2.04 THOUSAND/UL (ref 0.6–4.47)
LYMPHOCYTES # BLD AUTO: 6 % (ref 14–44)
LYMPHOCYTES # BLD AUTO: 6 % (ref 14–44)
LYMPHOCYTES # BLD AUTO: 7 % (ref 14–44)
LYMPHOCYTES NFR BLD AUTO: 5 % (ref 14–44)
LYMPHOCYTES NFR BLD AUTO: 7 % (ref 14–44)
LYMPHOCYTES NFR BLD AUTO: 8 % (ref 14–44)
MACROCYTES BLD QL AUTO: PRESENT
MAGNESIUM SERPL-MCNC: 1.9 MG/DL (ref 1.6–2.6)
MAGNESIUM SERPL-MCNC: 2 MG/DL (ref 1.6–2.6)
MAGNESIUM SERPL-MCNC: 2.1 MG/DL (ref 1.6–2.6)
MAGNESIUM SERPL-MCNC: 2.2 MG/DL (ref 1.6–2.6)
MAGNESIUM SERPL-MCNC: 2.4 MG/DL (ref 1.6–2.6)
MAGNESIUM SERPL-MCNC: 2.5 MG/DL (ref 1.6–2.6)
MAGNESIUM SERPL-MCNC: 2.6 MG/DL (ref 1.6–2.6)
MAGNESIUM SERPL-MCNC: 2.6 MG/DL (ref 1.6–2.6)
MCH RBC QN AUTO: 27.5 PG (ref 26.8–34.3)
MCH RBC QN AUTO: 27.6 PG (ref 26.8–34.3)
MCH RBC QN AUTO: 27.6 PG (ref 26.8–34.3)
MCH RBC QN AUTO: 27.7 PG (ref 26.8–34.3)
MCH RBC QN AUTO: 27.8 PG (ref 26.8–34.3)
MCH RBC QN AUTO: 28.4 PG (ref 26.8–34.3)
MCH RBC QN AUTO: 28.8 PG (ref 26.8–34.3)
MCH RBC QN AUTO: 29.2 PG (ref 26.8–34.3)
MCH RBC QN AUTO: 29.5 PG (ref 26.8–34.3)
MCH RBC QN AUTO: 29.8 PG (ref 26.8–34.3)
MCH RBC QN AUTO: 29.9 PG (ref 26.8–34.3)
MCH RBC QN AUTO: 30.1 PG (ref 26.8–34.3)
MCH RBC QN AUTO: 30.2 PG (ref 26.8–34.3)
MCH RBC QN AUTO: 30.2 PG (ref 26.8–34.3)
MCH RBC QN AUTO: 30.4 PG (ref 26.8–34.3)
MCH RBC QN AUTO: 30.4 PG (ref 26.8–34.3)
MCH RBC QN AUTO: 30.5 PG (ref 26.8–34.3)
MCHC RBC AUTO-ENTMCNC: 28.4 G/DL (ref 31.4–37.4)
MCHC RBC AUTO-ENTMCNC: 28.7 G/DL (ref 31.4–37.4)
MCHC RBC AUTO-ENTMCNC: 28.9 G/DL (ref 31.4–37.4)
MCHC RBC AUTO-ENTMCNC: 28.9 G/DL (ref 31.4–37.4)
MCHC RBC AUTO-ENTMCNC: 29 G/DL (ref 31.4–37.4)
MCHC RBC AUTO-ENTMCNC: 29.4 G/DL (ref 31.4–37.4)
MCHC RBC AUTO-ENTMCNC: 29.4 G/DL (ref 31.4–37.4)
MCHC RBC AUTO-ENTMCNC: 29.6 G/DL (ref 31.4–37.4)
MCHC RBC AUTO-ENTMCNC: 29.6 G/DL (ref 31.4–37.4)
MCHC RBC AUTO-ENTMCNC: 29.7 G/DL (ref 31.4–37.4)
MCHC RBC AUTO-ENTMCNC: 29.7 G/DL (ref 31.4–37.4)
MCHC RBC AUTO-ENTMCNC: 30 G/DL (ref 31.4–37.4)
MCHC RBC AUTO-ENTMCNC: 30.1 G/DL (ref 31.4–37.4)
MCHC RBC AUTO-ENTMCNC: 30.2 G/DL (ref 31.4–37.4)
MCHC RBC AUTO-ENTMCNC: 30.3 G/DL (ref 31.4–37.4)
MCHC RBC AUTO-ENTMCNC: 30.4 G/DL (ref 31.4–37.4)
MCHC RBC AUTO-ENTMCNC: 30.6 G/DL (ref 31.4–37.4)
MCHC RBC AUTO-ENTMCNC: 30.7 G/DL (ref 31.4–37.4)
MCHC RBC AUTO-ENTMCNC: 30.8 G/DL (ref 31.4–37.4)
MCV RBC AUTO: 100 FL (ref 82–98)
MCV RBC AUTO: 100 FL (ref 82–98)
MCV RBC AUTO: 101 FL (ref 82–98)
MCV RBC AUTO: 101 FL (ref 82–98)
MCV RBC AUTO: 102 FL (ref 82–98)
MCV RBC AUTO: 102 FL (ref 82–98)
MCV RBC AUTO: 103 FL (ref 82–98)
MCV RBC AUTO: 103 FL (ref 82–98)
MCV RBC AUTO: 104 FL (ref 82–98)
MCV RBC AUTO: 104 FL (ref 82–98)
MCV RBC AUTO: 105 FL (ref 82–98)
MCV RBC AUTO: 90 FL (ref 82–98)
MCV RBC AUTO: 91 FL (ref 82–98)
MCV RBC AUTO: 92 FL (ref 82–98)
MCV RBC AUTO: 92 FL (ref 82–98)
MCV RBC AUTO: 93 FL (ref 82–98)
MCV RBC AUTO: 96 FL (ref 82–98)
METAMYELOCYTES NFR BLD MANUAL: 1 % (ref 0–1)
METAMYELOCYTES NFR BLD MANUAL: 2 % (ref 0–1)
METAMYELOCYTES NFR BLD MANUAL: 2 % (ref 0–1)
METAMYELOCYTES NFR BLD MANUAL: 3 % (ref 0–1)
MONOCYTES # BLD AUTO: 0.34 THOUSAND/UL (ref 0–1.22)
MONOCYTES # BLD AUTO: 0.49 THOUSAND/ΜL (ref 0.17–1.22)
MONOCYTES # BLD AUTO: 0.52 THOUSAND/ΜL (ref 0.17–1.22)
MONOCYTES # BLD AUTO: 0.52 THOUSAND/ΜL (ref 0.17–1.22)
MONOCYTES # BLD AUTO: 0.57 THOUSAND/ΜL (ref 0.17–1.22)
MONOCYTES # BLD AUTO: 0.58 THOUSAND/ΜL (ref 0.17–1.22)
MONOCYTES # BLD AUTO: 0.59 THOUSAND/ΜL (ref 0.17–1.22)
MONOCYTES # BLD AUTO: 0.59 THOUSAND/ΜL (ref 0.17–1.22)
MONOCYTES # BLD AUTO: 0.61 THOUSAND/ΜL (ref 0.17–1.22)
MONOCYTES # BLD AUTO: 1.25 THOUSAND/UL (ref 0–1.22)
MONOCYTES # BLD AUTO: 1.51 THOUSAND/UL (ref 0–1.22)
MONOCYTES # BLD AUTO: 2.62 THOUSAND/UL (ref 0–1.22)
MONOCYTES NFR BLD AUTO: 10 % (ref 4–12)
MONOCYTES NFR BLD AUTO: 10 % (ref 4–12)
MONOCYTES NFR BLD AUTO: 11 % (ref 4–12)
MONOCYTES NFR BLD AUTO: 4 % (ref 4–12)
MONOCYTES NFR BLD AUTO: 8 % (ref 4–12)
MONOCYTES NFR BLD AUTO: 9 % (ref 4–12)
MONOCYTES NFR BLD: 4 % (ref 4–12)
MONOCYTES NFR BLD: 4 % (ref 4–12)
MONOCYTES NFR BLD: 5 % (ref 4–12)
MONOCYTES NFR BLD: 9 % (ref 4–12)
MRSA NOSE QL CULT: ABNORMAL
MRSA NOSE QL CULT: ABNORMAL
MYELOCYTES NFR BLD MANUAL: 1 % (ref 0–1)
MYELOCYTES NFR BLD MANUAL: 2 % (ref 0–1)
MYELOCYTES NFR BLD MANUAL: 2 % (ref 0–1)
MYELOCYTES NFR BLD MANUAL: 4 % (ref 0–1)
NEUTROPHILS # BLD AUTO: 11.73 THOUSANDS/ΜL (ref 1.85–7.62)
NEUTROPHILS # BLD AUTO: 4.24 THOUSANDS/ΜL (ref 1.85–7.62)
NEUTROPHILS # BLD AUTO: 4.27 THOUSANDS/ΜL (ref 1.85–7.62)
NEUTROPHILS # BLD AUTO: 4.62 THOUSANDS/ΜL (ref 1.85–7.62)
NEUTROPHILS # BLD AUTO: 4.89 THOUSANDS/ΜL (ref 1.85–7.62)
NEUTROPHILS # BLD AUTO: 4.97 THOUSANDS/ΜL (ref 1.85–7.62)
NEUTROPHILS # BLD AUTO: 5.15 THOUSANDS/ΜL (ref 1.85–7.62)
NEUTROPHILS # BLD AUTO: 5.67 THOUSANDS/ΜL (ref 1.85–7.62)
NEUTROPHILS # BLD MANUAL: 22.41 THOUSAND/UL (ref 1.85–7.62)
NEUTROPHILS # BLD MANUAL: 25.41 THOUSAND/UL (ref 1.85–7.62)
NEUTROPHILS # BLD MANUAL: 26.87 THOUSAND/UL (ref 1.85–7.62)
NEUTROPHILS # BLD MANUAL: 6.97 THOUSAND/UL (ref 1.85–7.62)
NEUTS BAND NFR BLD MANUAL: 14 % (ref 0–8)
NEUTS BAND NFR BLD MANUAL: 4 % (ref 0–8)
NEUTS BAND NFR BLD MANUAL: 40 % (ref 0–8)
NEUTS BAND NFR BLD MANUAL: 43 % (ref 0–8)
NEUTS SEG NFR BLD AUTO: 34 % (ref 43–75)
NEUTS SEG NFR BLD AUTO: 43 % (ref 43–75)
NEUTS SEG NFR BLD AUTO: 75 % (ref 43–75)
NEUTS SEG NFR BLD AUTO: 76 % (ref 43–75)
NEUTS SEG NFR BLD AUTO: 77 % (ref 43–75)
NEUTS SEG NFR BLD AUTO: 79 % (ref 43–75)
NEUTS SEG NFR BLD AUTO: 80 % (ref 43–75)
NEUTS SEG NFR BLD AUTO: 87 % (ref 43–75)
NITRITE UR QL STRIP: NEGATIVE
NON-SQ EPI CELLS URNS QL MICRO: NORMAL /HPF
NRBC BLD AUTO-RTO: 0 /100 WBCS
NRBC BLD AUTO-RTO: 12 /100 WBC (ref 0–2)
NRBC BLD AUTO-RTO: 21 /100 WBCS
NRBC BLD AUTO-RTO: 4 /100 WBC (ref 0–2)
NRBC BLD AUTO-RTO: 6 /100 WBC (ref 0–2)
NRBC BLD AUTO-RTO: 6 /100 WBC (ref 0–2)
NT-PROBNP SERPL-MCNC: 1568 PG/ML
O2 CT BLDA-SCNC: 10.2 ML/DL (ref 16–23)
O2 CT BLDA-SCNC: 10.3 ML/DL (ref 16–23)
O2 CT BLDA-SCNC: 10.3 ML/DL (ref 16–23)
O2 CT BLDA-SCNC: 10.4 ML/DL (ref 16–23)
O2 CT BLDA-SCNC: 10.5 ML/DL (ref 16–23)
O2 CT BLDA-SCNC: 10.6 ML/DL (ref 16–23)
O2 CT BLDA-SCNC: 10.7 ML/DL (ref 16–23)
O2 CT BLDA-SCNC: 10.7 ML/DL (ref 16–23)
O2 CT BLDA-SCNC: 10.8 ML/DL (ref 16–23)
O2 CT BLDA-SCNC: 10.9 ML/DL (ref 16–23)
O2 CT BLDA-SCNC: 10.9 ML/DL (ref 16–23)
O2 CT BLDA-SCNC: 11 ML/DL (ref 16–23)
O2 CT BLDA-SCNC: 11.4 ML/DL (ref 16–23)
O2 CT BLDA-SCNC: 11.6 ML/DL (ref 16–23)
O2 CT BLDA-SCNC: 6.6 ML/DL (ref 16–23)
O2 CT BLDA-SCNC: 7.8 ML/DL (ref 16–23)
O2 CT BLDA-SCNC: 7.9 ML/DL (ref 16–23)
O2 CT BLDA-SCNC: 9 ML/DL (ref 16–23)
O2 CT BLDA-SCNC: 9.9 ML/DL (ref 16–23)
O2 CT BLDA-SCNC: 9.9 ML/DL (ref 16–23)
O2 CT BLDV-SCNC: 10.4 ML/DL
O2 CT BLDV-SCNC: 10.6 ML/DL
O2 CT BLDV-SCNC: 7.6 ML/DL
OSMOLALITY UR/SERPL-RTO: 305 MMOL/KG (ref 282–298)
OSMOLALITY UR: 312 MMOL/KG
OXYHGB MFR BLDA: 63.4 % (ref 94–97)
OXYHGB MFR BLDA: 63.5 % (ref 94–97)
OXYHGB MFR BLDA: 78.7 % (ref 94–97)
OXYHGB MFR BLDA: 82.3 % (ref 94–97)
OXYHGB MFR BLDA: 83.3 % (ref 94–97)
OXYHGB MFR BLDA: 85.5 % (ref 94–97)
OXYHGB MFR BLDA: 86.6 % (ref 94–97)
OXYHGB MFR BLDA: 87.3 % (ref 94–97)
OXYHGB MFR BLDA: 88.6 % (ref 94–97)
OXYHGB MFR BLDA: 89.1 % (ref 94–97)
OXYHGB MFR BLDA: 90.3 % (ref 94–97)
OXYHGB MFR BLDA: 90.8 % (ref 94–97)
OXYHGB MFR BLDA: 91.2 % (ref 94–97)
OXYHGB MFR BLDA: 91.3 % (ref 94–97)
OXYHGB MFR BLDA: 91.5 % (ref 94–97)
OXYHGB MFR BLDA: 91.7 % (ref 94–97)
OXYHGB MFR BLDA: 93.4 % (ref 94–97)
OXYHGB MFR BLDA: 93.5 % (ref 94–97)
OXYHGB MFR BLDA: 93.5 % (ref 94–97)
OXYHGB MFR BLDA: 93.6 % (ref 94–97)
OXYHGB MFR BLDA: 93.9 % (ref 94–97)
OXYHGB MFR BLDA: 94.6 % (ref 94–97)
OXYHGB MFR BLDA: 95.4 % (ref 94–97)
OXYHGB MFR BLDA: 95.8 % (ref 94–97)
OXYHGB MFR BLDA: 96 % (ref 94–97)
OXYHGB MFR BLDA: 96 % (ref 94–97)
OXYHGB MFR BLDA: 96.8 % (ref 94–97)
OXYHGB MFR BLDA: 96.9 % (ref 94–97)
OXYHGB MFR BLDA: 97.7 % (ref 94–97)
OXYHGB MFR BLDA: 97.8 % (ref 94–97)
PA SYSTOLIC PRESSURE: 54 MMHG
PCO2 BLDA: 40.3 MM HG (ref 36–44)
PCO2 BLDA: 48.4 MM HG (ref 36–44)
PCO2 BLDA: 52.1 MM HG (ref 36–44)
PCO2 BLDA: 52.1 MM HG (ref 36–44)
PCO2 BLDA: 52.4 MM HG (ref 36–44)
PCO2 BLDA: 53 MM HG (ref 36–44)
PCO2 BLDA: 53.3 MM HG (ref 36–44)
PCO2 BLDA: 53.3 MM HG (ref 36–44)
PCO2 BLDA: 53.5 MM HG (ref 36–44)
PCO2 BLDA: 54 MM HG (ref 36–44)
PCO2 BLDA: 54.2 MM HG (ref 36–44)
PCO2 BLDA: 54.8 MM HG (ref 36–44)
PCO2 BLDA: 55.8 MM HG (ref 36–44)
PCO2 BLDA: 56.8 MM HG (ref 36–44)
PCO2 BLDA: 57.3 MM HG (ref 36–44)
PCO2 BLDA: 57.9 MM HG (ref 36–44)
PCO2 BLDA: 58.9 MM HG (ref 36–44)
PCO2 BLDA: 59.8 MM HG (ref 36–44)
PCO2 BLDA: 60.7 MM HG (ref 36–44)
PCO2 BLDA: 61.3 MM HG (ref 36–44)
PCO2 BLDA: 61.6 MM HG (ref 36–44)
PCO2 BLDA: 65.2 MM HG (ref 36–44)
PCO2 BLDA: 68 MM HG (ref 36–44)
PCO2 BLDA: 70.8 MM HG (ref 36–44)
PCO2 BLDA: 71 MM HG (ref 36–44)
PCO2 BLDA: 73.6 MM HG (ref 36–44)
PCO2 BLDA: 75.6 MM HG (ref 36–44)
PCO2 BLDA: 92.5 MM HG (ref 36–44)
PCO2 BLDA: 98.9 MM HG (ref 36–44)
PCO2 BLDA: 98.9 MM HG (ref 36–44)
PCO2 BLDV: 41.9 MM HG (ref 42–50)
PCO2 BLDV: 51 MM HG (ref 42–50)
PCO2 BLDV: 97.9 MM HG (ref 42–50)
PCO2 TEMP ADJ BLDA: 39.9 MM HG (ref 36–44)
PCO2 TEMP ADJ BLDA: 48.6 MM HG (ref 36–44)
PCO2 TEMP ADJ BLDA: 52.3 MM HG (ref 36–44)
PCO2 TEMP ADJ BLDA: 52.3 MM HG (ref 36–44)
PCO2 TEMP ADJ BLDA: 52.6 MM HG (ref 36–44)
PCO2 TEMP ADJ BLDA: 53.1 MM HG (ref 36–44)
PCO2 TEMP ADJ BLDA: 55.7 MM HG (ref 36–44)
PCO2 TEMP ADJ BLDA: 55.8 MM HG (ref 36–44)
PCO2 TEMP ADJ BLDA: 56 MM HG (ref 36–44)
PCO2 TEMP ADJ BLDA: 56.8 MM HG (ref 36–44)
PCO2 TEMP ADJ BLDA: 57.9 MM HG (ref 36–44)
PCO2 TEMP ADJ BLDA: 58.1 MM HG (ref 36–44)
PCO2 TEMP ADJ BLDA: 60.1 MM HG (ref 36–44)
PCO2 TEMP ADJ BLDA: 60.5 MM HG (ref 36–44)
PCO2 TEMP ADJ BLDA: 60.7 MM HG (ref 36–44)
PCO2 TEMP ADJ BLDA: 63 MM HG (ref 36–44)
PCO2 TEMP ADJ BLDA: 63.4 MM HG (ref 36–44)
PCO2 TEMP ADJ BLDA: 68.9 MM HG (ref 36–44)
PCO2 TEMP ADJ BLDA: 71 MM HG (ref 36–44)
PCO2 TEMP ADJ BLDA: 74.9 MM HG (ref 36–44)
PCO2 TEMP ADJ BLDA: 88.9 MM HG (ref 36–44)
PCO2 TEMP ADJ BLDA: 93.4 MM HG (ref 36–44)
PEEP RESPIRATORY: 10 CM[H2O]
PEEP RESPIRATORY: 5 CM[H2O]
PEEP RESPIRATORY: 6 CM[H2O]
PEEP RESPIRATORY: 8 CM[H2O]
PEEP RESPIRATORY: 8 CM[H2O]
PH BLD: 7.1 [PH] (ref 7.35–7.45)
PH BLD: 7.13 [PH] (ref 7.35–7.45)
PH BLD: 7.14 [PH] (ref 7.35–7.45)
PH BLD: 7.14 [PH] (ref 7.35–7.45)
PH BLD: 7.18 [PH] (ref 7.35–7.45)
PH BLD: 7.19 [PH] (ref 7.35–7.45)
PH BLD: 7.19 [PH] (ref 7.35–7.45)
PH BLD: 7.21 [PH] (ref 7.35–7.45)
PH BLD: 7.22 [PH] (ref 7.35–7.45)
PH BLD: 7.23 [PH] (ref 7.35–7.45)
PH BLD: 7.23 [PH] (ref 7.35–7.45)
PH BLD: 7.24 [PH] (ref 7.35–7.45)
PH BLD: 7.25 [PH] (ref 7.35–7.45)
PH BLD: 7.25 [PH] (ref 7.35–7.45)
PH BLD: 7.27 [PH] (ref 7.35–7.45)
PH BLD: 7.27 [PH] (ref 7.35–7.45)
PH BLD: 7.28 [PH] (ref 7.35–7.45)
PH BLD: 7.29 [PH] (ref 7.35–7.45)
PH BLD: 7.31 [PH] (ref 7.35–7.45)
PH BLDA: 7.08 [PH] (ref 7.35–7.45)
PH BLDA: 7.11 [PH] (ref 7.35–7.45)
PH BLDA: 7.12 [PH] (ref 7.35–7.45)
PH BLDA: 7.12 [PH] (ref 7.35–7.45)
PH BLDA: 7.14 [PH] (ref 7.35–7.45)
PH BLDA: 7.17 [PH] (ref 7.35–7.45)
PH BLDA: 7.18 [PH] (ref 7.35–7.45)
PH BLDA: 7.18 [PH] (ref 7.35–7.45)
PH BLDA: 7.2 [PH] (ref 7.35–7.45)
PH BLDA: 7.2 [PH] (ref 7.35–7.45)
PH BLDA: 7.21 [PH] (ref 7.35–7.45)
PH BLDA: 7.21 [PH] (ref 7.35–7.45)
PH BLDA: 7.22 [PH] (ref 7.35–7.45)
PH BLDA: 7.22 [PH] (ref 7.35–7.45)
PH BLDA: 7.23 [PH] (ref 7.35–7.45)
PH BLDA: 7.24 [PH] (ref 7.35–7.45)
PH BLDA: 7.25 [PH] (ref 7.35–7.45)
PH BLDA: 7.26 [PH] (ref 7.35–7.45)
PH BLDA: 7.27 [PH] (ref 7.35–7.45)
PH BLDA: 7.28 [PH] (ref 7.35–7.45)
PH BLDA: 7.28 [PH] (ref 7.35–7.45)
PH BLDA: 7.29 [PH] (ref 7.35–7.45)
PH BLDA: 7.31 [PH] (ref 7.35–7.45)
PH BLDV: 7.11 [PH] (ref 7.3–7.4)
PH BLDV: 7.26 [PH] (ref 7.3–7.4)
PH BLDV: 7.34 [PH] (ref 7.3–7.4)
PH UR STRIP.AUTO: 7 [PH]
PHOSPHATE SERPL-MCNC: 1.8 MG/DL (ref 2.3–4.1)
PHOSPHATE SERPL-MCNC: 1.9 MG/DL (ref 2.3–4.1)
PHOSPHATE SERPL-MCNC: 1.9 MG/DL (ref 2.3–4.1)
PHOSPHATE SERPL-MCNC: 2 MG/DL (ref 2.3–4.1)
PHOSPHATE SERPL-MCNC: 2.1 MG/DL (ref 2.3–4.1)
PHOSPHATE SERPL-MCNC: 2.2 MG/DL (ref 2.3–4.1)
PHOSPHATE SERPL-MCNC: 2.3 MG/DL (ref 2.3–4.1)
PHOSPHATE SERPL-MCNC: 2.4 MG/DL (ref 2.3–4.1)
PHOSPHATE SERPL-MCNC: 2.5 MG/DL (ref 2.3–4.1)
PHOSPHATE SERPL-MCNC: 2.6 MG/DL (ref 2.3–4.1)
PHOSPHATE SERPL-MCNC: 2.7 MG/DL (ref 2.3–4.1)
PHOSPHATE SERPL-MCNC: 2.8 MG/DL (ref 2.3–4.1)
PHOSPHATE SERPL-MCNC: 2.9 MG/DL (ref 2.3–4.1)
PHOSPHATE SERPL-MCNC: 3.1 MG/DL (ref 2.3–4.1)
PHOSPHATE SERPL-MCNC: 3.4 MG/DL (ref 2.3–4.1)
PHOSPHATE SERPL-MCNC: 3.5 MG/DL (ref 2.3–4.1)
PHOSPHATE SERPL-MCNC: 3.5 MG/DL (ref 2.3–4.1)
PHOSPHATE SERPL-MCNC: 4 MG/DL (ref 2.3–4.1)
PHOSPHATE SERPL-MCNC: 4.1 MG/DL (ref 2.3–4.1)
PHOSPHATE SERPL-MCNC: 4.2 MG/DL (ref 2.3–4.1)
PHOSPHATE SERPL-MCNC: 4.4 MG/DL (ref 2.3–4.1)
PHOSPHATE SERPL-MCNC: 4.5 MG/DL (ref 2.3–4.1)
PHOSPHATE SERPL-MCNC: 4.7 MG/DL (ref 2.3–4.1)
PHOSPHATE SERPL-MCNC: 4.8 MG/DL (ref 2.3–4.1)
PHOSPHATE SERPL-MCNC: 5.7 MG/DL (ref 2.3–4.1)
PHOSPHATE SERPL-MCNC: 6.3 MG/DL (ref 2.3–4.1)
PHOSPHATE SERPL-MCNC: 7.6 MG/DL (ref 2.3–4.1)
PLATELET # BLD AUTO: 100 THOUSANDS/UL (ref 149–390)
PLATELET # BLD AUTO: 115 THOUSANDS/UL (ref 149–390)
PLATELET # BLD AUTO: 128 THOUSANDS/UL (ref 149–390)
PLATELET # BLD AUTO: 146 THOUSANDS/UL (ref 149–390)
PLATELET # BLD AUTO: 165 THOUSANDS/UL (ref 149–390)
PLATELET # BLD AUTO: 170 THOUSANDS/UL (ref 149–390)
PLATELET # BLD AUTO: 178 THOUSANDS/UL (ref 149–390)
PLATELET # BLD AUTO: 178 THOUSANDS/UL (ref 149–390)
PLATELET # BLD AUTO: 184 THOUSANDS/UL (ref 149–390)
PLATELET # BLD AUTO: 192 THOUSANDS/UL (ref 149–390)
PLATELET # BLD AUTO: 32 THOUSANDS/UL (ref 149–390)
PLATELET # BLD AUTO: 33 THOUSANDS/UL (ref 149–390)
PLATELET # BLD AUTO: 33 THOUSANDS/UL (ref 149–390)
PLATELET # BLD AUTO: 35 THOUSANDS/UL (ref 149–390)
PLATELET # BLD AUTO: 38 THOUSANDS/UL (ref 149–390)
PLATELET # BLD AUTO: 38 THOUSANDS/UL (ref 149–390)
PLATELET # BLD AUTO: 47 THOUSANDS/UL (ref 149–390)
PLATELET # BLD AUTO: 61 THOUSANDS/UL (ref 149–390)
PLATELET # BLD AUTO: 67 THOUSANDS/UL (ref 149–390)
PLATELET BLD QL SMEAR: ABNORMAL
PMV BLD AUTO: 10.1 FL (ref 8.9–12.7)
PMV BLD AUTO: 10.1 FL (ref 8.9–12.7)
PMV BLD AUTO: 10.2 FL (ref 8.9–12.7)
PMV BLD AUTO: 10.4 FL (ref 8.9–12.7)
PMV BLD AUTO: 10.5 FL (ref 8.9–12.7)
PMV BLD AUTO: 10.7 FL (ref 8.9–12.7)
PMV BLD AUTO: 10.8 FL (ref 8.9–12.7)
PMV BLD AUTO: 10.8 FL (ref 8.9–12.7)
PMV BLD AUTO: 11 FL (ref 8.9–12.7)
PMV BLD AUTO: 11 FL (ref 8.9–12.7)
PMV BLD AUTO: 11.3 FL (ref 8.9–12.7)
PMV BLD AUTO: 11.6 FL (ref 8.9–12.7)
PMV BLD AUTO: 12.3 FL (ref 8.9–12.7)
PMV BLD AUTO: 12.3 FL (ref 8.9–12.7)
PMV BLD AUTO: 12.5 FL (ref 8.9–12.7)
PMV BLD AUTO: 12.7 FL (ref 8.9–12.7)
PMV BLD AUTO: 9.7 FL (ref 8.9–12.7)
PO2 BLD: 100.6 MM HG (ref 75–129)
PO2 BLD: 100.9 MM HG (ref 75–129)
PO2 BLD: 107.5 MM HG (ref 75–129)
PO2 BLD: 116.4 MM HG (ref 75–129)
PO2 BLD: 169.7 MM HG (ref 75–129)
PO2 BLD: 36.9 MM HG (ref 75–129)
PO2 BLD: 45.8 MM HG (ref 75–129)
PO2 BLD: 58.1 MM HG (ref 75–129)
PO2 BLD: 60.9 MM HG (ref 75–129)
PO2 BLD: 61.5 MM HG (ref 75–129)
PO2 BLD: 64.1 MM HG (ref 75–129)
PO2 BLD: 65.4 MM HG (ref 75–129)
PO2 BLD: 68.4 MM HG (ref 75–129)
PO2 BLD: 71.6 MM HG (ref 75–129)
PO2 BLD: 72.6 MM HG (ref 75–129)
PO2 BLD: 73.3 MM HG (ref 75–129)
PO2 BLD: 74.4 MM HG (ref 75–129)
PO2 BLD: 77.5 MM HG (ref 75–129)
PO2 BLD: 77.6 MM HG (ref 75–129)
PO2 BLD: 78.3 MM HG (ref 75–129)
PO2 BLD: 78.6 MM HG (ref 75–129)
PO2 BLD: 83.2 MM HG (ref 75–129)
PO2 BLD: 91.5 MM HG (ref 75–129)
PO2 BLD: 98.8 MM HG (ref 75–129)
PO2 BLDA: 102.4 MM HG (ref 75–129)
PO2 BLDA: 102.7 MM HG (ref 75–129)
PO2 BLDA: 104.1 MM HG (ref 75–129)
PO2 BLDA: 106.9 MM HG (ref 75–129)
PO2 BLDA: 115.8 MM HG (ref 75–129)
PO2 BLDA: 141.5 MM HG (ref 75–129)
PO2 BLDA: 169.1 MM HG (ref 75–129)
PO2 BLDA: 39.3 MM HG (ref 75–129)
PO2 BLDA: 40.5 MM HG (ref 75–129)
PO2 BLDA: 45.9 MM HG (ref 75–129)
PO2 BLDA: 51.2 MM HG (ref 75–129)
PO2 BLDA: 53 MM HG (ref 75–129)
PO2 BLDA: 58.1 MM HG (ref 75–129)
PO2 BLDA: 58.8 MM HG (ref 75–129)
PO2 BLDA: 62.4 MM HG (ref 75–129)
PO2 BLDA: 65 MM HG (ref 75–129)
PO2 BLDA: 66.1 MM HG (ref 75–129)
PO2 BLDA: 70.9 MM HG (ref 75–129)
PO2 BLDA: 70.9 MM HG (ref 75–129)
PO2 BLDA: 71.6 MM HG (ref 75–129)
PO2 BLDA: 71.6 MM HG (ref 75–129)
PO2 BLDA: 75.4 MM HG (ref 75–129)
PO2 BLDA: 77.8 MM HG (ref 75–129)
PO2 BLDA: 78.3 MM HG (ref 75–129)
PO2 BLDA: 79.1 MM HG (ref 75–129)
PO2 BLDA: 79.6 MM HG (ref 75–129)
PO2 BLDA: 79.6 MM HG (ref 75–129)
PO2 BLDA: 84 MM HG (ref 75–129)
PO2 BLDA: 88.1 MM HG (ref 75–129)
PO2 BLDA: 92.1 MM HG (ref 75–129)
PO2 BLDV: 36.7 MM HG (ref 35–45)
PO2 BLDV: 62.8 MM HG (ref 35–45)
PO2 BLDV: 76.6 MM HG (ref 35–45)
POLYCHROMASIA BLD QL SMEAR: PRESENT
POTASSIUM SERPL-SCNC: 2.5 MMOL/L (ref 3.5–5.3)
POTASSIUM SERPL-SCNC: 3.2 MMOL/L (ref 3.5–5.3)
POTASSIUM SERPL-SCNC: 3.3 MMOL/L (ref 3.5–5.3)
POTASSIUM SERPL-SCNC: 3.4 MMOL/L (ref 3.5–5.3)
POTASSIUM SERPL-SCNC: 3.7 MMOL/L (ref 3.5–5.3)
POTASSIUM SERPL-SCNC: 3.7 MMOL/L (ref 3.5–5.3)
POTASSIUM SERPL-SCNC: 3.8 MMOL/L (ref 3.5–5.3)
POTASSIUM SERPL-SCNC: 3.9 MMOL/L (ref 3.5–5.3)
POTASSIUM SERPL-SCNC: 4.1 MMOL/L (ref 3.5–5.3)
POTASSIUM SERPL-SCNC: 4.2 MMOL/L (ref 3.5–5.3)
POTASSIUM SERPL-SCNC: 4.3 MMOL/L (ref 3.5–5.3)
POTASSIUM SERPL-SCNC: 4.4 MMOL/L (ref 3.5–5.3)
POTASSIUM SERPL-SCNC: 4.5 MMOL/L (ref 3.5–5.3)
POTASSIUM SERPL-SCNC: 4.6 MMOL/L (ref 3.5–5.3)
POTASSIUM SERPL-SCNC: 4.7 MMOL/L (ref 3.5–5.3)
POTASSIUM SERPL-SCNC: 4.8 MMOL/L (ref 3.5–5.3)
PROCALCITONIN SERPL-MCNC: 0.54 NG/ML
PROCALCITONIN SERPL-MCNC: 0.55 NG/ML
PROCALCITONIN SERPL-MCNC: 0.64 NG/ML
PROCALCITONIN SERPL-MCNC: 0.79 NG/ML
PROCALCITONIN SERPL-MCNC: 27.51 NG/ML
PROCALCITONIN SERPL-MCNC: 64.33 NG/ML
PROCALCITONIN SERPL-MCNC: 92.31 NG/ML
PROCALCITONIN SERPL-MCNC: 97.02 NG/ML
PROT SERPL-MCNC: 6.3 G/DL (ref 6.4–8.2)
PROT SERPL-MCNC: 6.7 G/DL (ref 6.4–8.2)
PROT SERPL-MCNC: 7.2 G/DL (ref 6.4–8.2)
PROT SERPL-MCNC: 7.6 G/DL (ref 6.4–8.2)
PROT SERPL-MCNC: 7.8 G/DL (ref 6.4–8.2)
PROT UR STRIP-MCNC: NEGATIVE MG/DL
PROTHROMBIN TIME: 14.9 SECONDS (ref 11.6–14.5)
PROTHROMBIN TIME: 15.2 SECONDS (ref 11.6–14.5)
PROTHROMBIN TIME: 16.7 SECONDS (ref 11.6–14.5)
PROTHROMBIN TIME: 17.6 SECONDS (ref 11.6–14.5)
PROTHROMBIN TIME: 18.5 SECONDS (ref 11.6–14.5)
PROTHROMBIN TIME: 22.1 SECONDS (ref 11.6–14.5)
QRS AXIS: 128 DEGREES
QRS AXIS: 13 DEGREES
QRS AXIS: 17 DEGREES
QRS AXIS: 78 DEGREES
QRS AXIS: 9 DEGREES
QRSD INTERVAL: 108 MS
QRSD INTERVAL: 110 MS
QRSD INTERVAL: 110 MS
QRSD INTERVAL: 113 MS
QRSD INTERVAL: 116 MS
QT INTERVAL: 325 MS
QT INTERVAL: 360 MS
QT INTERVAL: 384 MS
QT INTERVAL: 402 MS
QT INTERVAL: 412 MS
QTC INTERVAL: 424 MS
QTC INTERVAL: 454 MS
QTC INTERVAL: 497 MS
QTC INTERVAL: 507 MS
QTC INTERVAL: 509 MS
RA PRESSURE ESTIMATED: 20 MMHG
RBC # BLD AUTO: 2.12 MILLION/UL (ref 3.88–5.62)
RBC # BLD AUTO: 2.17 MILLION/UL (ref 3.88–5.62)
RBC # BLD AUTO: 2.24 MILLION/UL (ref 3.88–5.62)
RBC # BLD AUTO: 2.3 MILLION/UL (ref 3.88–5.62)
RBC # BLD AUTO: 2.35 MILLION/UL (ref 3.88–5.62)
RBC # BLD AUTO: 2.38 MILLION/UL (ref 3.88–5.62)
RBC # BLD AUTO: 2.42 MILLION/UL (ref 3.88–5.62)
RBC # BLD AUTO: 2.43 MILLION/UL (ref 3.88–5.62)
RBC # BLD AUTO: 2.46 MILLION/UL (ref 3.88–5.62)
RBC # BLD AUTO: 2.48 MILLION/UL (ref 3.88–5.62)
RBC # BLD AUTO: 2.56 MILLION/UL (ref 3.88–5.62)
RBC # BLD AUTO: 2.57 MILLION/UL (ref 3.88–5.62)
RBC # BLD AUTO: 2.58 MILLION/UL (ref 3.88–5.62)
RBC # BLD AUTO: 2.6 MILLION/UL (ref 3.88–5.62)
RBC # BLD AUTO: 2.61 MILLION/UL (ref 3.88–5.62)
RBC # BLD AUTO: 2.68 MILLION/UL (ref 3.88–5.62)
RBC # BLD AUTO: 2.71 MILLION/UL (ref 3.88–5.62)
RBC # BLD AUTO: 2.75 MILLION/UL (ref 3.88–5.62)
RBC # BLD AUTO: 2.77 MILLION/UL (ref 3.88–5.62)
RBC #/AREA URNS AUTO: NORMAL /HPF
RBC MORPH BLD: PRESENT
RH BLD: POSITIVE
RH BLD: POSITIVE
RSV RNA RESP QL NAA+PROBE: NEGATIVE
RV PSP: 54 MMHG
S PNEUM AG UR QL: NEGATIVE
SARS-COV-2 RNA RESP QL NAA+PROBE: NEGATIVE
SL AMB POCT INR: 1.7
SL CV LV EF: 43
SL CV PED ECHO LEFT VENTRICLE DIASTOLIC VOLUME (MOD BIPLANE) 2D: 202 ML
SL CV PED ECHO LEFT VENTRICLE SYSTOLIC VOLUME (MOD BIPLANE) 2D: 90 ML
SODIUM 24H UR-SCNC: 6 MOL/L
SODIUM SERPL-SCNC: 127 MMOL/L (ref 136–145)
SODIUM SERPL-SCNC: 127 MMOL/L (ref 136–145)
SODIUM SERPL-SCNC: 128 MMOL/L (ref 136–145)
SODIUM SERPL-SCNC: 129 MMOL/L (ref 136–145)
SODIUM SERPL-SCNC: 130 MMOL/L (ref 136–145)
SODIUM SERPL-SCNC: 131 MMOL/L (ref 136–145)
SODIUM SERPL-SCNC: 131 MMOL/L (ref 136–145)
SODIUM SERPL-SCNC: 132 MMOL/L (ref 136–145)
SODIUM SERPL-SCNC: 133 MMOL/L (ref 136–145)
SODIUM SERPL-SCNC: 133 MMOL/L (ref 136–145)
SODIUM SERPL-SCNC: 134 MMOL/L (ref 136–145)
SODIUM SERPL-SCNC: 136 MMOL/L (ref 136–145)
SODIUM SERPL-SCNC: 137 MMOL/L (ref 136–145)
SODIUM SERPL-SCNC: 138 MMOL/L (ref 136–145)
SODIUM SERPL-SCNC: 139 MMOL/L (ref 136–145)
SODIUM SERPL-SCNC: 141 MMOL/L (ref 136–145)
SODIUM SERPL-SCNC: 143 MMOL/L (ref 136–145)
SP GR UR STRIP.AUTO: 1.01 (ref 1–1.03)
SPECIMEN EXPIRATION DATE: NORMAL
SPECIMEN EXPIRATION DATE: NORMAL
SPECIMEN SOURCE: ABNORMAL
T WAVE AXIS: -22 DEGREES
T WAVE AXIS: -48 DEGREES
T WAVE AXIS: 1 DEGREES
T WAVE AXIS: 4 DEGREES
T WAVE AXIS: 7 DEGREES
TR MAX PG: 34 MMHG
TR PEAK VELOCITY: 2.9 M/S
TRICUSPID VALVE PEAK REGURGITATION VELOCITY: 2.89 M/S
UNIT DISPENSE STATUS: NORMAL
UNIT PRODUCT CODE: NORMAL
UNIT PRODUCT VOLUME: 350 ML
UNIT RH: NORMAL
UROBILINOGEN UR QL STRIP.AUTO: 0.2 E.U./DL
UUN 24H UR-MCNC: 318 MG/DL
VANCOMYCIN SERPL-MCNC: 18.5 UG/ML (ref 10–20)
VANCOMYCIN TROUGH SERPL-MCNC: 13.9 UG/ML (ref 10–20)
VANCOMYCIN TROUGH SERPL-MCNC: 19 UG/ML (ref 10–20)
VENT AC: 18
VENT AC: 20
VENT AC: 25
VENT AC: 30
VENT AC: 35
VENT AC: 38
VENT- AC: AC
VENTRICULAR RATE: 101 BPM
VENTRICULAR RATE: 102 BPM
VENTRICULAR RATE: 92 BPM
VENTRICULAR RATE: 96 BPM
VENTRICULAR RATE: 96 BPM
VT SETTING VENT: 320 ML
VT SETTING VENT: 370 ML
VT SETTING VENT: 370 ML
VT SETTING VENT: 400 ML
VT SETTING VENT: 450 ML
VT SETTING VENT: 500 ML
VT SETTING VENT: 500 ML
WBC # BLD AUTO: 13.52 THOUSAND/UL (ref 4.31–10.16)
WBC # BLD AUTO: 23.96 THOUSAND/UL (ref 4.31–10.16)
WBC # BLD AUTO: 26.78 THOUSAND/UL (ref 4.31–10.16)
WBC # BLD AUTO: 27.51 THOUSAND/UL (ref 4.31–10.16)
WBC # BLD AUTO: 27.7 THOUSAND/UL (ref 4.31–10.16)
WBC # BLD AUTO: 29.1 THOUSAND/UL (ref 4.31–10.16)
WBC # BLD AUTO: 30.19 THOUSAND/UL (ref 4.31–10.16)
WBC # BLD AUTO: 31.31 THOUSAND/UL (ref 4.31–10.16)
WBC # BLD AUTO: 31.37 THOUSAND/UL (ref 4.31–10.16)
WBC # BLD AUTO: 31.86 THOUSAND/UL (ref 4.31–10.16)
WBC # BLD AUTO: 5.5 THOUSAND/UL (ref 4.31–10.16)
WBC # BLD AUTO: 5.58 THOUSAND/UL (ref 4.31–10.16)
WBC # BLD AUTO: 6.02 THOUSAND/UL (ref 4.31–10.16)
WBC # BLD AUTO: 6.38 THOUSAND/UL (ref 4.31–10.16)
WBC # BLD AUTO: 6.39 THOUSAND/UL (ref 4.31–10.16)
WBC # BLD AUTO: 6.53 THOUSAND/UL (ref 4.31–10.16)
WBC # BLD AUTO: 6.97 THOUSAND/UL (ref 4.31–10.16)
WBC # BLD AUTO: 7.26 THOUSAND/UL (ref 4.31–10.16)
WBC # BLD AUTO: 8.4 THOUSAND/UL (ref 4.31–10.16)
WBC #/AREA URNS AUTO: NORMAL /HPF
Z-SCORE OF ASCENDING AORTA: 0.37
Z-SCORE OF INTERVENTRICULAR SEPTUM IN END DIASTOLE: -1.41
Z-SCORE OF LEFT VENTRICULAR DIMENSION IN END SYSTOLE: -25.26
Z-SCORE OF LEFT VENTRICULAR POSTERIOR WALL IN END DIASTOLE: 1.57

## 2022-01-01 PROCEDURE — 94645 CONT INHLJ TX EACH ADDL HOUR: CPT

## 2022-01-01 PROCEDURE — 94640 AIRWAY INHALATION TREATMENT: CPT

## 2022-01-01 PROCEDURE — 74450 X-RAY URETHRA/BLADDER: CPT

## 2022-01-01 PROCEDURE — 82330 ASSAY OF CALCIUM: CPT | Performed by: INTERNAL MEDICINE

## 2022-01-01 PROCEDURE — 99291 CRITICAL CARE FIRST HOUR: CPT | Performed by: PHYSICIAN ASSISTANT

## 2022-01-01 PROCEDURE — C9113 INJ PANTOPRAZOLE SODIUM, VIA: HCPCS

## 2022-01-01 PROCEDURE — 87449 NOS EACH ORGANISM AG IA: CPT | Performed by: FAMILY MEDICINE

## 2022-01-01 PROCEDURE — 84145 PROCALCITONIN (PCT): CPT | Performed by: FAMILY MEDICINE

## 2022-01-01 PROCEDURE — 85014 HEMATOCRIT: CPT | Performed by: PHYSICIAN ASSISTANT

## 2022-01-01 PROCEDURE — 97530 THERAPEUTIC ACTIVITIES: CPT

## 2022-01-01 PROCEDURE — 93971 EXTREMITY STUDY: CPT | Performed by: SURGERY

## 2022-01-01 PROCEDURE — 84145 PROCALCITONIN (PCT): CPT | Performed by: NURSE PRACTITIONER

## 2022-01-01 PROCEDURE — P9016 RBC LEUKOCYTES REDUCED: HCPCS

## 2022-01-01 PROCEDURE — 94002 VENT MGMT INPAT INIT DAY: CPT

## 2022-01-01 PROCEDURE — 83735 ASSAY OF MAGNESIUM: CPT | Performed by: STUDENT IN AN ORGANIZED HEALTH CARE EDUCATION/TRAINING PROGRAM

## 2022-01-01 PROCEDURE — 80048 BASIC METABOLIC PNL TOTAL CA: CPT | Performed by: PHYSICIAN ASSISTANT

## 2022-01-01 PROCEDURE — 86850 RBC ANTIBODY SCREEN: CPT | Performed by: HOSPITALIST

## 2022-01-01 PROCEDURE — 84484 ASSAY OF TROPONIN QUANT: CPT | Performed by: EMERGENCY MEDICINE

## 2022-01-01 PROCEDURE — 94669 MECHANICAL CHEST WALL OSCILL: CPT

## 2022-01-01 PROCEDURE — 85027 COMPLETE CBC AUTOMATED: CPT | Performed by: NURSE PRACTITIONER

## 2022-01-01 PROCEDURE — 82330 ASSAY OF CALCIUM: CPT | Performed by: STUDENT IN AN ORGANIZED HEALTH CARE EDUCATION/TRAINING PROGRAM

## 2022-01-01 PROCEDURE — 85007 BL SMEAR W/DIFF WBC COUNT: CPT | Performed by: NURSE PRACTITIONER

## 2022-01-01 PROCEDURE — 99291 CRITICAL CARE FIRST HOUR: CPT

## 2022-01-01 PROCEDURE — 36620 INSERTION CATHETER ARTERY: CPT

## 2022-01-01 PROCEDURE — 71045 X-RAY EXAM CHEST 1 VIEW: CPT

## 2022-01-01 PROCEDURE — 90945 DIALYSIS ONE EVALUATION: CPT | Performed by: INTERNAL MEDICINE

## 2022-01-01 PROCEDURE — 82533 TOTAL CORTISOL: CPT | Performed by: NURSE PRACTITIONER

## 2022-01-01 PROCEDURE — C1769 GUIDE WIRE: HCPCS

## 2022-01-01 PROCEDURE — 94003 VENT MGMT INPAT SUBQ DAY: CPT

## 2022-01-01 PROCEDURE — 99239 HOSP IP/OBS DSCHRG MGMT >30: CPT | Performed by: PHYSICIAN ASSISTANT

## 2022-01-01 PROCEDURE — 85610 PROTHROMBIN TIME: CPT | Performed by: PHYSICIAN ASSISTANT

## 2022-01-01 PROCEDURE — 94644 CONT INHLJ TX 1ST HOUR: CPT

## 2022-01-01 PROCEDURE — 86923 COMPATIBILITY TEST ELECTRIC: CPT

## 2022-01-01 PROCEDURE — 81001 URINALYSIS AUTO W/SCOPE: CPT | Performed by: FAMILY MEDICINE

## 2022-01-01 PROCEDURE — 85027 COMPLETE CBC AUTOMATED: CPT | Performed by: PHYSICIAN ASSISTANT

## 2022-01-01 PROCEDURE — 83735 ASSAY OF MAGNESIUM: CPT | Performed by: INTERNAL MEDICINE

## 2022-01-01 PROCEDURE — 85014 HEMATOCRIT: CPT | Performed by: FAMILY MEDICINE

## 2022-01-01 PROCEDURE — 87070 CULTURE OTHR SPECIMN AEROBIC: CPT | Performed by: INTERNAL MEDICINE

## 2022-01-01 PROCEDURE — 97167 OT EVAL HIGH COMPLEX 60 MIN: CPT

## 2022-01-01 PROCEDURE — 80048 BASIC METABOLIC PNL TOTAL CA: CPT | Performed by: INTERNAL MEDICINE

## 2022-01-01 PROCEDURE — 85018 HEMOGLOBIN: CPT | Performed by: NURSE PRACTITIONER

## 2022-01-01 PROCEDURE — 85025 COMPLETE CBC W/AUTO DIFF WBC: CPT

## 2022-01-01 PROCEDURE — 90945 DIALYSIS ONE EVALUATION: CPT

## 2022-01-01 PROCEDURE — 87040 BLOOD CULTURE FOR BACTERIA: CPT | Performed by: NURSE PRACTITIONER

## 2022-01-01 PROCEDURE — 82805 BLOOD GASES W/O2 SATURATION: CPT

## 2022-01-01 PROCEDURE — 83605 ASSAY OF LACTIC ACID: CPT | Performed by: NURSE PRACTITIONER

## 2022-01-01 PROCEDURE — 84100 ASSAY OF PHOSPHORUS: CPT | Performed by: STUDENT IN AN ORGANIZED HEALTH CARE EDUCATION/TRAINING PROGRAM

## 2022-01-01 PROCEDURE — C1887 CATHETER, GUIDING: HCPCS

## 2022-01-01 PROCEDURE — 85025 COMPLETE CBC W/AUTO DIFF WBC: CPT | Performed by: EMERGENCY MEDICINE

## 2022-01-01 PROCEDURE — 0B9M8ZZ DRAINAGE OF BILATERAL LUNGS, VIA NATURAL OR ARTIFICIAL OPENING ENDOSCOPIC: ICD-10-PCS | Performed by: INTERNAL MEDICINE

## 2022-01-01 PROCEDURE — 02HV33Z INSERTION OF INFUSION DEVICE INTO SUPERIOR VENA CAVA, PERCUTANEOUS APPROACH: ICD-10-PCS | Performed by: INTERNAL MEDICINE

## 2022-01-01 PROCEDURE — 85018 HEMOGLOBIN: CPT | Performed by: PHYSICIAN ASSISTANT

## 2022-01-01 PROCEDURE — 31622 DX BRONCHOSCOPE/WASH: CPT | Performed by: INTERNAL MEDICINE

## 2022-01-01 PROCEDURE — 99223 1ST HOSP IP/OBS HIGH 75: CPT | Performed by: INTERNAL MEDICINE

## 2022-01-01 PROCEDURE — 80048 BASIC METABOLIC PNL TOTAL CA: CPT | Performed by: STUDENT IN AN ORGANIZED HEALTH CARE EDUCATION/TRAINING PROGRAM

## 2022-01-01 PROCEDURE — NC001 PR NO CHARGE: Performed by: INTERNAL MEDICINE

## 2022-01-01 PROCEDURE — 30233N1 TRANSFUSION OF NONAUTOLOGOUS RED BLOOD CELLS INTO PERIPHERAL VEIN, PERCUTANEOUS APPROACH: ICD-10-PCS | Performed by: HOSPITALIST

## 2022-01-01 PROCEDURE — 93306 TTE W/DOPPLER COMPLETE: CPT | Performed by: INTERNAL MEDICINE

## 2022-01-01 PROCEDURE — 82805 BLOOD GASES W/O2 SATURATION: CPT | Performed by: PHYSICIAN ASSISTANT

## 2022-01-01 PROCEDURE — 84540 ASSAY OF URINE/UREA-N: CPT | Performed by: FAMILY MEDICINE

## 2022-01-01 PROCEDURE — 99291 CRITICAL CARE FIRST HOUR: CPT | Performed by: INTERNAL MEDICINE

## 2022-01-01 PROCEDURE — 82805 BLOOD GASES W/O2 SATURATION: CPT | Performed by: NURSE PRACTITIONER

## 2022-01-01 PROCEDURE — 85018 HEMOGLOBIN: CPT | Performed by: FAMILY MEDICINE

## 2022-01-01 PROCEDURE — 80053 COMPREHEN METABOLIC PANEL: CPT | Performed by: NURSE PRACTITIONER

## 2022-01-01 PROCEDURE — 99232 SBSQ HOSP IP/OBS MODERATE 35: CPT | Performed by: NURSE PRACTITIONER

## 2022-01-01 PROCEDURE — 99232 SBSQ HOSP IP/OBS MODERATE 35: CPT | Performed by: INTERNAL MEDICINE

## 2022-01-01 PROCEDURE — 83735 ASSAY OF MAGNESIUM: CPT | Performed by: NURSE PRACTITIONER

## 2022-01-01 PROCEDURE — 82948 REAGENT STRIP/BLOOD GLUCOSE: CPT

## 2022-01-01 PROCEDURE — 86901 BLOOD TYPING SEROLOGIC RH(D): CPT | Performed by: HOSPITALIST

## 2022-01-01 PROCEDURE — NC001 PR NO CHARGE

## 2022-01-01 PROCEDURE — 87070 CULTURE OTHR SPECIMN AEROBIC: CPT | Performed by: FAMILY MEDICINE

## 2022-01-01 PROCEDURE — C1729 CATH, DRAINAGE: HCPCS

## 2022-01-01 PROCEDURE — 72125 CT NECK SPINE W/O DYE: CPT

## 2022-01-01 PROCEDURE — 74018 RADEX ABDOMEN 1 VIEW: CPT

## 2022-01-01 PROCEDURE — 84100 ASSAY OF PHOSPHORUS: CPT | Performed by: INTERNAL MEDICINE

## 2022-01-01 PROCEDURE — 83735 ASSAY OF MAGNESIUM: CPT | Performed by: FAMILY MEDICINE

## 2022-01-01 PROCEDURE — 93005 ELECTROCARDIOGRAM TRACING: CPT

## 2022-01-01 PROCEDURE — 85730 THROMBOPLASTIN TIME PARTIAL: CPT | Performed by: EMERGENCY MEDICINE

## 2022-01-01 PROCEDURE — 99232 SBSQ HOSP IP/OBS MODERATE 35: CPT | Performed by: PHYSICIAN ASSISTANT

## 2022-01-01 PROCEDURE — 85007 BL SMEAR W/DIFF WBC COUNT: CPT | Performed by: PHYSICIAN ASSISTANT

## 2022-01-01 PROCEDURE — P9040 RBC LEUKOREDUCED IRRADIATED: HCPCS

## 2022-01-01 PROCEDURE — 97116 GAIT TRAINING THERAPY: CPT

## 2022-01-01 PROCEDURE — 83735 ASSAY OF MAGNESIUM: CPT | Performed by: EMERGENCY MEDICINE

## 2022-01-01 PROCEDURE — 93971 EXTREMITY STUDY: CPT

## 2022-01-01 PROCEDURE — 85610 PROTHROMBIN TIME: CPT | Performed by: EMERGENCY MEDICINE

## 2022-01-01 PROCEDURE — 0T9B70Z DRAINAGE OF BLADDER WITH DRAINAGE DEVICE, VIA NATURAL OR ARTIFICIAL OPENING: ICD-10-PCS | Performed by: INTERNAL MEDICINE

## 2022-01-01 PROCEDURE — 85610 PROTHROMBIN TIME: CPT | Performed by: NURSE PRACTITIONER

## 2022-01-01 PROCEDURE — 99222 1ST HOSP IP/OBS MODERATE 55: CPT | Performed by: INTERNAL MEDICINE

## 2022-01-01 PROCEDURE — 85025 COMPLETE CBC W/AUTO DIFF WBC: CPT | Performed by: PHYSICIAN ASSISTANT

## 2022-01-01 PROCEDURE — 99232 SBSQ HOSP IP/OBS MODERATE 35: CPT | Performed by: SURGERY

## 2022-01-01 PROCEDURE — NC001 PR NO CHARGE: Performed by: NURSE PRACTITIONER

## 2022-01-01 PROCEDURE — 5A1D90Z PERFORMANCE OF URINARY FILTRATION, CONTINUOUS, GREATER THAN 18 HOURS PER DAY: ICD-10-PCS | Performed by: INTERNAL MEDICINE

## 2022-01-01 PROCEDURE — 93010 ELECTROCARDIOGRAM REPORT: CPT | Performed by: INTERNAL MEDICINE

## 2022-01-01 PROCEDURE — 30233N1 TRANSFUSION OF NONAUTOLOGOUS RED BLOOD CELLS INTO PERIPHERAL VEIN, PERCUTANEOUS APPROACH: ICD-10-PCS | Performed by: INTERNAL MEDICINE

## 2022-01-01 PROCEDURE — 99233 SBSQ HOSP IP/OBS HIGH 50: CPT | Performed by: INTERNAL MEDICINE

## 2022-01-01 PROCEDURE — 80048 BASIC METABOLIC PNL TOTAL CA: CPT | Performed by: EMERGENCY MEDICINE

## 2022-01-01 PROCEDURE — 97535 SELF CARE MNGMENT TRAINING: CPT

## 2022-01-01 PROCEDURE — 84145 PROCALCITONIN (PCT): CPT | Performed by: PHYSICIAN ASSISTANT

## 2022-01-01 PROCEDURE — 83935 ASSAY OF URINE OSMOLALITY: CPT | Performed by: PHYSICIAN ASSISTANT

## 2022-01-01 PROCEDURE — 80053 COMPREHEN METABOLIC PANEL: CPT | Performed by: FAMILY MEDICINE

## 2022-01-01 PROCEDURE — 93793 ANTICOAG MGMT PT WARFARIN: CPT | Performed by: PHYSICIAN ASSISTANT

## 2022-01-01 PROCEDURE — 74176 CT ABD & PELVIS W/O CONTRAST: CPT

## 2022-01-01 PROCEDURE — 84484 ASSAY OF TROPONIN QUANT: CPT | Performed by: FAMILY MEDICINE

## 2022-01-01 PROCEDURE — 85027 COMPLETE CBC AUTOMATED: CPT | Performed by: STUDENT IN AN ORGANIZED HEALTH CARE EDUCATION/TRAINING PROGRAM

## 2022-01-01 PROCEDURE — 76937 US GUIDE VASCULAR ACCESS: CPT | Performed by: PHYSICIAN ASSISTANT

## 2022-01-01 PROCEDURE — 85025 COMPLETE CBC W/AUTO DIFF WBC: CPT | Performed by: NURSE PRACTITIONER

## 2022-01-01 PROCEDURE — 99285 EMERGENCY DEPT VISIT HI MDM: CPT

## 2022-01-01 PROCEDURE — 86900 BLOOD TYPING SEROLOGIC ABO: CPT | Performed by: HOSPITALIST

## 2022-01-01 PROCEDURE — 86901 BLOOD TYPING SEROLOGIC RH(D): CPT | Performed by: INTERNAL MEDICINE

## 2022-01-01 PROCEDURE — 80076 HEPATIC FUNCTION PANEL: CPT | Performed by: NURSE PRACTITIONER

## 2022-01-01 PROCEDURE — 80053 COMPREHEN METABOLIC PANEL: CPT

## 2022-01-01 PROCEDURE — 87205 SMEAR GRAM STAIN: CPT | Performed by: FAMILY MEDICINE

## 2022-01-01 PROCEDURE — 70450 CT HEAD/BRAIN W/O DYE: CPT

## 2022-01-01 PROCEDURE — NC001 PR NO CHARGE: Performed by: RADIOLOGY

## 2022-01-01 PROCEDURE — 99285 EMERGENCY DEPT VISIT HI MDM: CPT | Performed by: EMERGENCY MEDICINE

## 2022-01-01 PROCEDURE — 83735 ASSAY OF MAGNESIUM: CPT

## 2022-01-01 PROCEDURE — 90935 HEMODIALYSIS ONE EVALUATION: CPT | Performed by: INTERNAL MEDICINE

## 2022-01-01 PROCEDURE — 99223 1ST HOSP IP/OBS HIGH 75: CPT | Performed by: FAMILY MEDICINE

## 2022-01-01 PROCEDURE — NC001 PR NO CHARGE: Performed by: PHYSICIAN ASSISTANT

## 2022-01-01 PROCEDURE — 83605 ASSAY OF LACTIC ACID: CPT | Performed by: STUDENT IN AN ORGANIZED HEALTH CARE EDUCATION/TRAINING PROGRAM

## 2022-01-01 PROCEDURE — 94668 MNPJ CHEST WALL SBSQ: CPT

## 2022-01-01 PROCEDURE — 80162 ASSAY OF DIGOXIN TOTAL: CPT | Performed by: PHYSICIAN ASSISTANT

## 2022-01-01 PROCEDURE — 86900 BLOOD TYPING SEROLOGIC ABO: CPT | Performed by: INTERNAL MEDICINE

## 2022-01-01 PROCEDURE — 51610 INJECTION FOR BLADDER X-RAY: CPT

## 2022-01-01 PROCEDURE — 87081 CULTURE SCREEN ONLY: CPT | Performed by: FAMILY MEDICINE

## 2022-01-01 PROCEDURE — 87106 FUNGI IDENTIFICATION YEAST: CPT | Performed by: INTERNAL MEDICINE

## 2022-01-01 PROCEDURE — C1894 INTRO/SHEATH, NON-LASER: HCPCS

## 2022-01-01 PROCEDURE — 84300 ASSAY OF URINE SODIUM: CPT | Performed by: FAMILY MEDICINE

## 2022-01-01 PROCEDURE — 93306 TTE W/DOPPLER COMPLETE: CPT

## 2022-01-01 PROCEDURE — 99233 SBSQ HOSP IP/OBS HIGH 50: CPT | Performed by: SURGERY

## 2022-01-01 PROCEDURE — 51702 INSERT TEMP BLADDER CATH: CPT | Performed by: RADIOLOGY

## 2022-01-01 PROCEDURE — BT151ZZ FLUOROSCOPY OF URETHRA USING LOW OSMOLAR CONTRAST: ICD-10-PCS | Performed by: RADIOLOGY

## 2022-01-01 PROCEDURE — 0T9B70Z DRAINAGE OF BLADDER WITH DRAINAGE DEVICE, VIA NATURAL OR ARTIFICIAL OPENING: ICD-10-PCS | Performed by: RADIOLOGY

## 2022-01-01 PROCEDURE — 99291 CRITICAL CARE FIRST HOUR: CPT | Performed by: FAMILY MEDICINE

## 2022-01-01 PROCEDURE — 80048 BASIC METABOLIC PNL TOTAL CA: CPT | Performed by: FAMILY MEDICINE

## 2022-01-01 PROCEDURE — 99223 1ST HOSP IP/OBS HIGH 75: CPT | Performed by: PHYSICIAN ASSISTANT

## 2022-01-01 PROCEDURE — 83880 ASSAY OF NATRIURETIC PEPTIDE: CPT | Performed by: EMERGENCY MEDICINE

## 2022-01-01 PROCEDURE — 80048 BASIC METABOLIC PNL TOTAL CA: CPT | Performed by: HOSPITALIST

## 2022-01-01 PROCEDURE — 97163 PT EVAL HIGH COMPLEX 45 MIN: CPT

## 2022-01-01 PROCEDURE — 83930 ASSAY OF BLOOD OSMOLALITY: CPT | Performed by: PHYSICIAN ASSISTANT

## 2022-01-01 PROCEDURE — 83605 ASSAY OF LACTIC ACID: CPT

## 2022-01-01 PROCEDURE — 96365 THER/PROPH/DIAG IV INF INIT: CPT

## 2022-01-01 PROCEDURE — 82570 ASSAY OF URINE CREATININE: CPT | Performed by: FAMILY MEDICINE

## 2022-01-01 PROCEDURE — 71260 CT THORAX DX C+: CPT

## 2022-01-01 PROCEDURE — 80202 ASSAY OF VANCOMYCIN: CPT | Performed by: INTERNAL MEDICINE

## 2022-01-01 PROCEDURE — 36556 INSERT NON-TUNNEL CV CATH: CPT | Performed by: PHYSICIAN ASSISTANT

## 2022-01-01 PROCEDURE — 99292 CRITICAL CARE ADDL 30 MIN: CPT | Performed by: INTERNAL MEDICINE

## 2022-01-01 PROCEDURE — 85025 COMPLETE CBC W/AUTO DIFF WBC: CPT | Performed by: FAMILY MEDICINE

## 2022-01-01 PROCEDURE — 36415 COLL VENOUS BLD VENIPUNCTURE: CPT | Performed by: EMERGENCY MEDICINE

## 2022-01-01 PROCEDURE — 94664 DEMO&/EVAL PT USE INHALER: CPT

## 2022-01-01 PROCEDURE — G1004 CDSM NDSC: HCPCS

## 2022-01-01 PROCEDURE — 0BH17EZ INSERTION OF ENDOTRACHEAL AIRWAY INTO TRACHEA, VIA NATURAL OR ARTIFICIAL OPENING: ICD-10-PCS | Performed by: INTERNAL MEDICINE

## 2022-01-01 PROCEDURE — 5A1955Z RESPIRATORY VENTILATION, GREATER THAN 96 CONSECUTIVE HOURS: ICD-10-PCS | Performed by: INTERNAL MEDICINE

## 2022-01-01 PROCEDURE — RECHECK: Performed by: PHYSICIAN ASSISTANT

## 2022-01-01 PROCEDURE — 99222 1ST HOSP IP/OBS MODERATE 55: CPT | Performed by: SURGERY

## 2022-01-01 PROCEDURE — 74177 CT ABD & PELVIS W/CONTRAST: CPT

## 2022-01-01 PROCEDURE — 0241U HB NFCT DS VIR RESP RNA 4 TRGT: CPT | Performed by: EMERGENCY MEDICINE

## 2022-01-01 PROCEDURE — 99291 CRITICAL CARE FIRST HOUR: CPT | Performed by: NURSE PRACTITIONER

## 2022-01-01 PROCEDURE — 86850 RBC ANTIBODY SCREEN: CPT | Performed by: INTERNAL MEDICINE

## 2022-01-01 PROCEDURE — 87186 SC STD MICRODIL/AGAR DIL: CPT | Performed by: FAMILY MEDICINE

## 2022-01-01 PROCEDURE — 80162 ASSAY OF DIGOXIN TOTAL: CPT | Performed by: NURSE PRACTITIONER

## 2022-01-01 PROCEDURE — 0B918ZZ DRAINAGE OF TRACHEA, VIA NATURAL OR ARTIFICIAL OPENING ENDOSCOPIC: ICD-10-PCS | Performed by: INTERNAL MEDICINE

## 2022-01-01 PROCEDURE — 85730 THROMBOPLASTIN TIME PARTIAL: CPT | Performed by: NURSE PRACTITIONER

## 2022-01-01 RX ORDER — GLYCOPYRROLATE 0.2 MG/ML
0.1 INJECTION INTRAMUSCULAR; INTRAVENOUS EVERY 4 HOURS PRN
Status: DISCONTINUED | OUTPATIENT
Start: 2022-01-01 | End: 2022-01-01 | Stop reason: HOSPADM

## 2022-01-01 RX ORDER — CALCIUM GLUCONATE 20 MG/ML
1 INJECTION, SOLUTION INTRAVENOUS ONCE
Status: COMPLETED | OUTPATIENT
Start: 2022-01-01 | End: 2022-01-01

## 2022-01-01 RX ORDER — HYDROMORPHONE HCL/PF 1 MG/ML
0.5 SYRINGE (ML) INJECTION EVERY 4 HOURS PRN
Status: DISCONTINUED | OUTPATIENT
Start: 2022-01-01 | End: 2022-01-01

## 2022-01-01 RX ORDER — PROPOFOL 10 MG/ML
INJECTION, EMULSION INTRAVENOUS
Status: COMPLETED
Start: 2022-01-01 | End: 2022-01-01

## 2022-01-01 RX ORDER — MAGNESIUM SULFATE 1 G/100ML
1 INJECTION INTRAVENOUS ONCE
Status: COMPLETED | OUTPATIENT
Start: 2022-01-01 | End: 2022-01-01

## 2022-01-01 RX ORDER — ONDANSETRON 2 MG/ML
4 INJECTION INTRAMUSCULAR; INTRAVENOUS EVERY 6 HOURS PRN
Status: DISCONTINUED | OUTPATIENT
Start: 2022-01-01 | End: 2022-01-01

## 2022-01-01 RX ORDER — SIMETHICONE 80 MG
80 TABLET,CHEWABLE ORAL EVERY 6 HOURS PRN
Status: DISCONTINUED | OUTPATIENT
Start: 2022-01-01 | End: 2022-01-01

## 2022-01-01 RX ORDER — PANTOPRAZOLE SODIUM 40 MG/1
40 TABLET, DELAYED RELEASE ORAL
Status: CANCELLED | OUTPATIENT
Start: 2022-01-01

## 2022-01-01 RX ORDER — CHLORHEXIDINE GLUCONATE 0.12 MG/ML
15 RINSE ORAL EVERY 12 HOURS SCHEDULED
Status: DISCONTINUED | OUTPATIENT
Start: 2022-01-01 | End: 2022-01-01 | Stop reason: HOSPADM

## 2022-01-01 RX ORDER — LACTULOSE 10 G/15ML
30 SOLUTION ORAL 3 TIMES DAILY
Status: DISCONTINUED | OUTPATIENT
Start: 2022-01-01 | End: 2022-01-01

## 2022-01-01 RX ORDER — CALCIUM GLUCONATE 20 MG/ML
2 INJECTION, SOLUTION INTRAVENOUS ONCE
Status: COMPLETED | OUTPATIENT
Start: 2022-01-01 | End: 2022-01-01

## 2022-01-01 RX ORDER — LINEZOLID 2 MG/ML
600 INJECTION, SOLUTION INTRAVENOUS EVERY 12 HOURS
Status: DISCONTINUED | OUTPATIENT
Start: 2022-01-01 | End: 2022-01-01

## 2022-01-01 RX ORDER — FOLIC ACID 1 MG/1
1 TABLET ORAL DAILY
Status: DISCONTINUED | OUTPATIENT
Start: 2022-01-01 | End: 2022-01-01

## 2022-01-01 RX ORDER — PANTOPRAZOLE SODIUM 40 MG/1
40 INJECTION, POWDER, FOR SOLUTION INTRAVENOUS
Status: DISCONTINUED | OUTPATIENT
Start: 2022-01-01 | End: 2022-01-01

## 2022-01-01 RX ORDER — ACETAMINOPHEN 325 MG/1
650 TABLET ORAL EVERY 6 HOURS PRN
Status: CANCELLED | OUTPATIENT
Start: 2022-01-01

## 2022-01-01 RX ORDER — VECURONIUM BROMIDE 1 MG/ML
10 INJECTION, POWDER, LYOPHILIZED, FOR SOLUTION INTRAVENOUS ONCE
Status: COMPLETED | OUTPATIENT
Start: 2022-01-01 | End: 2022-01-01

## 2022-01-01 RX ORDER — LINEZOLID 2 MG/ML
600 INJECTION, SOLUTION INTRAVENOUS EVERY 12 HOURS
Status: DISCONTINUED | OUTPATIENT
Start: 2022-01-01 | End: 2022-01-01 | Stop reason: HOSPADM

## 2022-01-01 RX ORDER — DIGOXIN 0.25 MG/ML
250 INJECTION INTRAMUSCULAR; INTRAVENOUS EVERY 6 HOURS
Status: COMPLETED | OUTPATIENT
Start: 2022-01-01 | End: 2022-01-01

## 2022-01-01 RX ORDER — POTASSIUM CHLORIDE 14.9 MG/ML
20 INJECTION INTRAVENOUS ONCE
Status: COMPLETED | OUTPATIENT
Start: 2022-01-01 | End: 2022-01-01

## 2022-01-01 RX ORDER — MIDODRINE HYDROCHLORIDE 5 MG/1
10 TABLET ORAL
Status: DISCONTINUED | OUTPATIENT
Start: 2022-01-01 | End: 2022-01-01

## 2022-01-01 RX ORDER — SODIUM CHLORIDE FOR INHALATION 0.9 %
3 VIAL, NEBULIZER (ML) INHALATION
Status: DISCONTINUED | OUTPATIENT
Start: 2022-01-01 | End: 2022-01-01

## 2022-01-01 RX ORDER — MIDAZOLAM HYDROCHLORIDE 2 MG/2ML
2 INJECTION, SOLUTION INTRAMUSCULAR; INTRAVENOUS ONCE
Status: DISCONTINUED | OUTPATIENT
Start: 2022-01-01 | End: 2022-01-01

## 2022-01-01 RX ORDER — POLYETHYLENE GLYCOL 3350 17 G/17G
17 POWDER, FOR SOLUTION ORAL 2 TIMES DAILY
Status: DISCONTINUED | OUTPATIENT
Start: 2022-01-01 | End: 2022-01-01

## 2022-01-01 RX ORDER — POTASSIUM CHLORIDE 29.8 MG/ML
40 INJECTION INTRAVENOUS ONCE
Status: COMPLETED | OUTPATIENT
Start: 2022-01-01 | End: 2022-01-01

## 2022-01-01 RX ORDER — CEFTRIAXONE 1 G/50ML
1000 INJECTION, SOLUTION INTRAVENOUS EVERY 24 HOURS
Status: DISCONTINUED | OUTPATIENT
Start: 2022-01-01 | End: 2022-01-01

## 2022-01-01 RX ORDER — FUROSEMIDE 10 MG/ML
10 SYRINGE (ML) INJECTION CONTINUOUS
Status: DISCONTINUED | OUTPATIENT
Start: 2022-01-01 | End: 2022-01-01

## 2022-01-01 RX ORDER — PROPOFOL 10 MG/ML
5-50 INJECTION, EMULSION INTRAVENOUS
Status: DISCONTINUED | OUTPATIENT
Start: 2022-01-01 | End: 2022-01-01

## 2022-01-01 RX ORDER — BUMETANIDE 0.25 MG/ML
1.5 INJECTION INTRAMUSCULAR; INTRAVENOUS CONTINUOUS
Status: CANCELLED | OUTPATIENT
Start: 2022-01-01

## 2022-01-01 RX ORDER — MINERAL OIL AND PETROLATUM 150; 830 MG/G; MG/G
OINTMENT OPHTHALMIC
Status: DISCONTINUED | OUTPATIENT
Start: 2022-01-01 | End: 2022-01-01

## 2022-01-01 RX ORDER — LORAZEPAM 2 MG/ML
1 INJECTION INTRAMUSCULAR
Status: DISCONTINUED | OUTPATIENT
Start: 2022-01-01 | End: 2022-01-01 | Stop reason: HOSPADM

## 2022-01-01 RX ORDER — ALBUMIN (HUMAN) 12.5 G/50ML
50 SOLUTION INTRAVENOUS ONCE
Status: COMPLETED | OUTPATIENT
Start: 2022-01-01 | End: 2022-01-01

## 2022-01-01 RX ORDER — FENTANYL CITRATE-0.9 % NACL/PF 10 MCG/ML
100 PLASTIC BAG, INJECTION (ML) INTRAVENOUS CONTINUOUS
Status: DISCONTINUED | OUTPATIENT
Start: 2022-01-01 | End: 2022-01-01

## 2022-01-01 RX ORDER — AMOXICILLIN 250 MG
2 CAPSULE ORAL 2 TIMES DAILY
Status: DISCONTINUED | OUTPATIENT
Start: 2022-01-01 | End: 2022-01-01 | Stop reason: HOSPADM

## 2022-01-01 RX ORDER — METOLAZONE 5 MG/1
TABLET ORAL
Qty: 60 TABLET | Refills: 2 | Status: SHIPPED | OUTPATIENT
Start: 2022-01-01

## 2022-01-01 RX ORDER — ALBUMIN (HUMAN) 12.5 G/50ML
25 SOLUTION INTRAVENOUS EVERY 6 HOURS
Status: COMPLETED | OUTPATIENT
Start: 2022-01-01 | End: 2022-01-01

## 2022-01-01 RX ORDER — CALCIUM CHLORIDE 100 MG/ML
SYRINGE (ML) INTRAVENOUS CODE/TRAUMA/SEDATION MEDICATION
Status: COMPLETED | OUTPATIENT
Start: 2022-01-01 | End: 2022-01-01

## 2022-01-01 RX ORDER — METOLAZONE 2.5 MG/1
10 TABLET ORAL DAILY
Status: DISCONTINUED | OUTPATIENT
Start: 2022-01-01 | End: 2022-01-01 | Stop reason: HOSPADM

## 2022-01-01 RX ORDER — MAGNESIUM HYDROXIDE/ALUMINUM HYDROXICE/SIMETHICONE 120; 1200; 1200 MG/30ML; MG/30ML; MG/30ML
30 SUSPENSION ORAL EVERY 4 HOURS PRN
Status: DISCONTINUED | OUTPATIENT
Start: 2022-01-01 | End: 2022-01-01

## 2022-01-01 RX ORDER — ALBUMIN (HUMAN) 12.5 G/50ML
25 SOLUTION INTRAVENOUS EVERY 6 HOURS
Status: DISCONTINUED | OUTPATIENT
Start: 2022-01-01 | End: 2022-01-01

## 2022-01-01 RX ORDER — DIGOXIN 0.25 MG/ML
62.5 INJECTION INTRAMUSCULAR; INTRAVENOUS EVERY OTHER DAY
Status: DISCONTINUED | OUTPATIENT
Start: 2022-01-01 | End: 2022-01-01

## 2022-01-01 RX ORDER — POTASSIUM CHLORIDE 29.8 MG/ML
40 INJECTION INTRAVENOUS ONCE
Status: DISCONTINUED | OUTPATIENT
Start: 2022-01-01 | End: 2022-01-01 | Stop reason: SDUPTHER

## 2022-01-01 RX ORDER — FENTANYL CITRATE 50 UG/ML
INJECTION, SOLUTION INTRAMUSCULAR; INTRAVENOUS
Status: COMPLETED
Start: 2022-01-01 | End: 2022-01-01

## 2022-01-01 RX ORDER — SODIUM BICARBONATE 84 MG/ML
INJECTION, SOLUTION INTRAVENOUS CODE/TRAUMA/SEDATION MEDICATION
Status: COMPLETED | OUTPATIENT
Start: 2022-01-01 | End: 2022-01-01

## 2022-01-01 RX ORDER — CHLORHEXIDINE GLUCONATE 0.12 MG/ML
15 RINSE ORAL EVERY 12 HOURS SCHEDULED
Status: CANCELLED | OUTPATIENT
Start: 2022-01-01

## 2022-01-01 RX ORDER — AZITHROMYCIN 250 MG/1
500 TABLET, FILM COATED ORAL ONCE
Status: COMPLETED | OUTPATIENT
Start: 2022-01-01 | End: 2022-01-01

## 2022-01-01 RX ORDER — PANTOPRAZOLE SODIUM 40 MG/1
40 TABLET, DELAYED RELEASE ORAL
Status: DISCONTINUED | OUTPATIENT
Start: 2022-01-01 | End: 2022-01-01 | Stop reason: HOSPADM

## 2022-01-01 RX ORDER — POTASSIUM CHLORIDE 14.9 MG/ML
20 INJECTION INTRAVENOUS
Status: COMPLETED | OUTPATIENT
Start: 2022-01-01 | End: 2022-01-01

## 2022-01-01 RX ORDER — HEPARIN SODIUM 5000 [USP'U]/ML
7500 INJECTION, SOLUTION INTRAVENOUS; SUBCUTANEOUS EVERY 8 HOURS SCHEDULED
Status: DISCONTINUED | OUTPATIENT
Start: 2022-01-01 | End: 2022-01-01

## 2022-01-01 RX ORDER — LIDOCAINE HYDROCHLORIDE 10 MG/ML
INJECTION, SOLUTION EPIDURAL; INFILTRATION; INTRACAUDAL; PERINEURAL
Status: DISPENSED
Start: 2022-01-01 | End: 2022-01-01

## 2022-01-01 RX ORDER — METOPROLOL SUCCINATE 25 MG/1
25 TABLET, EXTENDED RELEASE ORAL 2 TIMES DAILY
Status: CANCELLED | OUTPATIENT
Start: 2022-01-01

## 2022-01-01 RX ORDER — ALBUMIN (HUMAN) 12.5 G/50ML
25 SOLUTION INTRAVENOUS ONCE
Status: COMPLETED | OUTPATIENT
Start: 2022-01-01 | End: 2022-01-01

## 2022-01-01 RX ORDER — DEXMEDETOMIDINE 100 UG/ML
.1-.7 INJECTION, SOLUTION, CONCENTRATE INTRAVENOUS
Status: DISCONTINUED | OUTPATIENT
Start: 2022-01-01 | End: 2022-01-01

## 2022-01-01 RX ORDER — METOPROLOL SUCCINATE 25 MG/1
25 TABLET, EXTENDED RELEASE ORAL EVERY 12 HOURS SCHEDULED
Status: DISCONTINUED | OUTPATIENT
Start: 2022-01-01 | End: 2022-01-01

## 2022-01-01 RX ORDER — ALBUMIN (HUMAN) 12.5 G/50ML
25 SOLUTION INTRAVENOUS ONCE
Status: DISCONTINUED | OUTPATIENT
Start: 2022-01-01 | End: 2022-01-01

## 2022-01-01 RX ORDER — FENTANYL CITRATE 50 UG/ML
INJECTION, SOLUTION INTRAMUSCULAR; INTRAVENOUS
Status: DISPENSED
Start: 2022-01-01 | End: 2022-01-01

## 2022-01-01 RX ORDER — SODIUM CHLORIDE, SODIUM GLUCONATE, SODIUM ACETATE, POTASSIUM CHLORIDE, MAGNESIUM CHLORIDE, SODIUM PHOSPHATE, DIBASIC, AND POTASSIUM PHOSPHATE .53; .5; .37; .037; .03; .012; .00082 G/100ML; G/100ML; G/100ML; G/100ML; G/100ML; G/100ML; G/100ML
500 INJECTION, SOLUTION INTRAVENOUS ONCE
Status: COMPLETED | OUTPATIENT
Start: 2022-01-01 | End: 2022-01-01

## 2022-01-01 RX ORDER — HYDROMORPHONE HCL/PF 1 MG/ML
1 SYRINGE (ML) INJECTION
Status: DISCONTINUED | OUTPATIENT
Start: 2022-01-01 | End: 2022-01-01 | Stop reason: HOSPADM

## 2022-01-01 RX ORDER — ALBUTEROL SULFATE 2.5 MG/3ML
2.5 SOLUTION RESPIRATORY (INHALATION) EVERY 6 HOURS PRN
Status: DISCONTINUED | OUTPATIENT
Start: 2022-01-01 | End: 2022-01-01 | Stop reason: HOSPADM

## 2022-01-01 RX ORDER — MIDODRINE HYDROCHLORIDE 5 MG/1
5 TABLET ORAL ONCE
Status: COMPLETED | OUTPATIENT
Start: 2022-01-01 | End: 2022-01-01

## 2022-01-01 RX ORDER — POTASSIUM CHLORIDE 20 MEQ/1
40 TABLET, EXTENDED RELEASE ORAL EVERY 4 HOURS
Status: COMPLETED | OUTPATIENT
Start: 2022-01-01 | End: 2022-01-01

## 2022-01-01 RX ORDER — OXYCODONE HYDROCHLORIDE 10 MG/1
10 TABLET ORAL EVERY 4 HOURS PRN
Status: CANCELLED | OUTPATIENT
Start: 2022-01-01

## 2022-01-01 RX ORDER — HYDROMORPHONE HCL/PF 1 MG/ML
1 SYRINGE (ML) INJECTION EVERY 4 HOURS PRN
Status: DISCONTINUED | OUTPATIENT
Start: 2022-01-01 | End: 2022-01-01

## 2022-01-01 RX ORDER — OXYCODONE HYDROCHLORIDE 10 MG/1
10 TABLET ORAL EVERY 4 HOURS PRN
Status: DISCONTINUED | OUTPATIENT
Start: 2022-01-01 | End: 2022-01-01 | Stop reason: HOSPADM

## 2022-01-01 RX ORDER — CALCIUM CARBONATE 200(500)MG
500 TABLET,CHEWABLE ORAL 3 TIMES DAILY PRN
Status: DISCONTINUED | OUTPATIENT
Start: 2022-01-01 | End: 2022-01-01

## 2022-01-01 RX ORDER — METOLAZONE 2.5 MG/1
10 TABLET ORAL DAILY
Status: CANCELLED | OUTPATIENT
Start: 2022-01-01

## 2022-01-01 RX ORDER — FENTANYL CITRATE 50 UG/ML
50 INJECTION, SOLUTION INTRAMUSCULAR; INTRAVENOUS
Status: DISCONTINUED | OUTPATIENT
Start: 2022-01-01 | End: 2022-01-01

## 2022-01-01 RX ORDER — METOLAZONE 5 MG/1
10 TABLET ORAL DAILY
Status: DISCONTINUED | OUTPATIENT
Start: 2022-01-01 | End: 2022-01-01

## 2022-01-01 RX ORDER — ALBUTEROL SULFATE 2.5 MG/3ML
2.5 SOLUTION RESPIRATORY (INHALATION) EVERY 6 HOURS PRN
Status: CANCELLED | OUTPATIENT
Start: 2022-01-01

## 2022-01-01 RX ORDER — METOPROLOL SUCCINATE 25 MG/1
25 TABLET, EXTENDED RELEASE ORAL 2 TIMES DAILY
Status: DISCONTINUED | OUTPATIENT
Start: 2022-01-01 | End: 2022-01-01 | Stop reason: HOSPADM

## 2022-01-01 RX ORDER — POTASSIUM CHLORIDE 20 MEQ/1
40 TABLET, EXTENDED RELEASE ORAL 2 TIMES DAILY
Status: DISCONTINUED | OUTPATIENT
Start: 2022-01-01 | End: 2022-01-01

## 2022-01-01 RX ORDER — CHLORHEXIDINE GLUCONATE 0.12 MG/ML
15 RINSE ORAL EVERY 12 HOURS SCHEDULED
Status: DISCONTINUED | OUTPATIENT
Start: 2022-01-01 | End: 2022-01-01

## 2022-01-01 RX ORDER — ALBUMIN (HUMAN) 12.5 G/50ML
100 SOLUTION INTRAVENOUS ONCE
Status: COMPLETED | OUTPATIENT
Start: 2022-01-01 | End: 2022-01-01

## 2022-01-01 RX ORDER — EPINEPHRINE 0.1 MG/ML
SYRINGE (ML) INJECTION CODE/TRAUMA/SEDATION MEDICATION
Status: COMPLETED | OUTPATIENT
Start: 2022-01-01 | End: 2022-01-01

## 2022-01-01 RX ORDER — MIDODRINE HYDROCHLORIDE 5 MG/1
5 TABLET ORAL
Status: DISCONTINUED | OUTPATIENT
Start: 2022-01-01 | End: 2022-01-01

## 2022-01-01 RX ORDER — ASPIRIN 81 MG/1
162 TABLET, CHEWABLE ORAL DAILY
COMMUNITY

## 2022-01-01 RX ORDER — DIGOXIN 0.25 MG/ML
500 INJECTION INTRAMUSCULAR; INTRAVENOUS ONCE
Status: COMPLETED | OUTPATIENT
Start: 2022-01-01 | End: 2022-01-01

## 2022-01-01 RX ORDER — ASPIRIN 325 MG
325 TABLET ORAL DAILY
Status: ON HOLD | COMMUNITY
End: 2022-01-01

## 2022-01-01 RX ORDER — METOPROLOL SUCCINATE 25 MG/1
25 TABLET, EXTENDED RELEASE ORAL 2 TIMES DAILY
Status: DISCONTINUED | OUTPATIENT
Start: 2022-01-01 | End: 2022-01-01

## 2022-01-01 RX ORDER — POTASSIUM CHLORIDE 20 MEQ/1
40 TABLET, EXTENDED RELEASE ORAL ONCE
Status: COMPLETED | OUTPATIENT
Start: 2022-01-01 | End: 2022-01-01

## 2022-01-01 RX ORDER — PANTOPRAZOLE SODIUM 40 MG/1
40 TABLET, DELAYED RELEASE ORAL
Status: DISCONTINUED | OUTPATIENT
Start: 2022-01-01 | End: 2022-01-01

## 2022-01-01 RX ORDER — SODIUM CHLORIDE 30 MG/ML INHALATION SOLUTION 30 MG/ML
4 SOLUTION INHALANT
Status: DISCONTINUED | OUTPATIENT
Start: 2022-01-01 | End: 2022-01-01

## 2022-01-01 RX ORDER — CALCIUM CARBONATE 200(500)MG
500 TABLET,CHEWABLE ORAL 3 TIMES DAILY PRN
Status: DISCONTINUED | OUTPATIENT
Start: 2022-01-01 | End: 2022-01-01 | Stop reason: HOSPADM

## 2022-01-01 RX ORDER — MIDODRINE HYDROCHLORIDE 5 MG/1
10 TABLET ORAL
Status: DISCONTINUED | OUTPATIENT
Start: 2022-01-01 | End: 2022-01-01 | Stop reason: HOSPADM

## 2022-01-01 RX ORDER — CALCIUM CARBONATE 200(500)MG
750 TABLET,CHEWABLE ORAL ONCE
Status: COMPLETED | OUTPATIENT
Start: 2022-01-01 | End: 2022-01-01

## 2022-01-01 RX ORDER — MIDODRINE HYDROCHLORIDE 5 MG/1
10 TABLET ORAL
Status: CANCELLED | OUTPATIENT
Start: 2022-01-01

## 2022-01-01 RX ORDER — OXYCODONE HYDROCHLORIDE 5 MG/1
5 TABLET ORAL EVERY 4 HOURS PRN
Status: DISCONTINUED | OUTPATIENT
Start: 2022-01-01 | End: 2022-01-01

## 2022-01-01 RX ORDER — PROPOFOL 10 MG/ML
INJECTION, EMULSION INTRAVENOUS
Status: DISPENSED
Start: 2022-01-01 | End: 2022-01-01

## 2022-01-01 RX ORDER — FUROSEMIDE 10 MG/ML
40 INJECTION INTRAMUSCULAR; INTRAVENOUS ONCE
Status: COMPLETED | OUTPATIENT
Start: 2022-01-01 | End: 2022-01-01

## 2022-01-01 RX ORDER — OXYCODONE HYDROCHLORIDE 10 MG/1
10 TABLET ORAL EVERY 4 HOURS PRN
Status: DISCONTINUED | OUTPATIENT
Start: 2022-01-01 | End: 2022-01-01

## 2022-01-01 RX ORDER — OXYCODONE HYDROCHLORIDE 5 MG/1
5 TABLET ORAL EVERY 4 HOURS PRN
Status: DISCONTINUED | OUTPATIENT
Start: 2022-01-01 | End: 2022-01-01 | Stop reason: HOSPADM

## 2022-01-01 RX ORDER — LORAZEPAM 2 MG/ML
2 INJECTION INTRAMUSCULAR ONCE
Status: COMPLETED | OUTPATIENT
Start: 2022-01-01 | End: 2022-01-01

## 2022-01-01 RX ORDER — ACETAMINOPHEN 325 MG/1
650 TABLET ORAL EVERY 6 HOURS PRN
Status: DISCONTINUED | OUTPATIENT
Start: 2022-01-01 | End: 2022-01-01

## 2022-01-01 RX ORDER — HYDROMORPHONE HCL/PF 1 MG/ML
1 SYRINGE (ML) INJECTION EVERY 2 HOUR PRN
Status: DISCONTINUED | OUTPATIENT
Start: 2022-01-01 | End: 2022-01-01

## 2022-01-01 RX ORDER — CALCIUM CARBONATE 200(500)MG
500 TABLET,CHEWABLE ORAL 3 TIMES DAILY PRN
Status: CANCELLED | OUTPATIENT
Start: 2022-01-01

## 2022-01-01 RX ORDER — LEVALBUTEROL 1.25 MG/.5ML
1.25 SOLUTION, CONCENTRATE RESPIRATORY (INHALATION)
Status: DISCONTINUED | OUTPATIENT
Start: 2022-01-01 | End: 2022-01-01

## 2022-01-01 RX ORDER — AMOXICILLIN 250 MG
2 CAPSULE ORAL 2 TIMES DAILY
Status: DISCONTINUED | OUTPATIENT
Start: 2022-01-01 | End: 2022-01-01

## 2022-01-01 RX ORDER — HYDROMORPHONE HCL 110MG/55ML
4 PATIENT CONTROLLED ANALGESIA SYRINGE INTRAVENOUS ONCE
Status: COMPLETED | OUTPATIENT
Start: 2022-01-01 | End: 2022-01-01

## 2022-01-01 RX ORDER — BUMETANIDE 0.25 MG/ML
1.5 INJECTION INTRAMUSCULAR; INTRAVENOUS CONTINUOUS
Status: DISCONTINUED | OUTPATIENT
Start: 2022-01-01 | End: 2022-01-01

## 2022-01-01 RX ORDER — BISACODYL 10 MG
10 SUPPOSITORY, RECTAL RECTAL DAILY
Status: DISCONTINUED | OUTPATIENT
Start: 2022-01-01 | End: 2022-01-01

## 2022-01-01 RX ORDER — BUMETANIDE 0.25 MG/ML
1.5 INJECTION INTRAMUSCULAR; INTRAVENOUS CONTINUOUS
Status: DISCONTINUED | OUTPATIENT
Start: 2022-01-01 | End: 2022-01-01 | Stop reason: HOSPADM

## 2022-01-01 RX ORDER — FUROSEMIDE 80 MG
TABLET ORAL
Qty: 180 TABLET | Refills: 1 | Status: SHIPPED | OUTPATIENT
Start: 2022-01-01

## 2022-01-01 RX ORDER — LACTULOSE 10 G/15ML
20 SOLUTION ORAL 3 TIMES DAILY
Status: DISCONTINUED | OUTPATIENT
Start: 2022-01-01 | End: 2022-01-01

## 2022-01-01 RX ORDER — CEFTRIAXONE 2 G/50ML
2000 INJECTION, SOLUTION INTRAVENOUS ONCE
Status: COMPLETED | OUTPATIENT
Start: 2022-01-01 | End: 2022-01-01

## 2022-01-01 RX ORDER — ALBUTEROL SULFATE 2.5 MG/3ML
2.5 SOLUTION RESPIRATORY (INHALATION) EVERY 6 HOURS PRN
Status: DISCONTINUED | OUTPATIENT
Start: 2022-01-01 | End: 2022-01-01

## 2022-01-01 RX ORDER — LINEZOLID 2 MG/ML
600 INJECTION, SOLUTION INTRAVENOUS EVERY 12 HOURS
Status: CANCELLED | OUTPATIENT
Start: 2022-01-01

## 2022-01-01 RX ORDER — MIDAZOLAM HYDROCHLORIDE 2 MG/2ML
INJECTION, SOLUTION INTRAMUSCULAR; INTRAVENOUS
Status: DISPENSED
Start: 2022-01-01 | End: 2022-01-01

## 2022-01-01 RX ORDER — HYDROMORPHONE HCL/PF 1 MG/ML
0.5 SYRINGE (ML) INJECTION ONCE
Status: COMPLETED | OUTPATIENT
Start: 2022-01-01 | End: 2022-01-01

## 2022-01-01 RX ORDER — MIDODRINE HYDROCHLORIDE 5 MG/1
15 TABLET ORAL
Status: DISCONTINUED | OUTPATIENT
Start: 2022-01-01 | End: 2022-01-01

## 2022-01-01 RX ORDER — LANOLIN ALCOHOL/MO/W.PET/CERES
100 CREAM (GRAM) TOPICAL DAILY
Status: DISCONTINUED | OUTPATIENT
Start: 2022-01-01 | End: 2022-01-01

## 2022-01-01 RX ORDER — OXYCODONE HYDROCHLORIDE 5 MG/1
5 TABLET ORAL EVERY 4 HOURS PRN
Status: CANCELLED | OUTPATIENT
Start: 2022-01-01

## 2022-01-01 RX ORDER — MAGNESIUM SULFATE HEPTAHYDRATE 40 MG/ML
2 INJECTION, SOLUTION INTRAVENOUS ONCE
Status: COMPLETED | OUTPATIENT
Start: 2022-01-01 | End: 2022-01-01

## 2022-01-01 RX ORDER — ACETAMINOPHEN 325 MG/1
650 TABLET ORAL EVERY 6 HOURS PRN
Status: DISCONTINUED | OUTPATIENT
Start: 2022-01-01 | End: 2022-01-01 | Stop reason: HOSPADM

## 2022-01-01 RX ORDER — AMOXICILLIN 250 MG
2 CAPSULE ORAL 2 TIMES DAILY
Status: CANCELLED | OUTPATIENT
Start: 2022-01-01

## 2022-01-01 RX ADMIN — EPINEPHRINE 20 MCG/MIN: 1 INJECTION INTRAMUSCULAR; INTRAVENOUS; SUBCUTANEOUS at 11:45

## 2022-01-01 RX ADMIN — POTASSIUM CHLORIDE 20 MEQ: 14.9 INJECTION, SOLUTION INTRAVENOUS at 16:43

## 2022-01-01 RX ADMIN — NOREPINEPHRINE BITARTRATE 90 MCG/MIN: 1 INJECTION, SOLUTION, CONCENTRATE INTRAVENOUS at 10:19

## 2022-01-01 RX ADMIN — MIDODRINE HYDROCHLORIDE 10 MG: 5 TABLET ORAL at 06:34

## 2022-01-01 RX ADMIN — EPINEPHRINE 20 MCG/MIN: 1 INJECTION INTRAMUSCULAR; INTRAVENOUS; SUBCUTANEOUS at 05:01

## 2022-01-01 RX ADMIN — WHITE PETROLATUM 57.7 %-MINERAL OIL 31.9 % EYE OINTMENT 1 APPLICATION: at 04:50

## 2022-01-01 RX ADMIN — CHLORHEXIDINE GLUCONATE 0.12% ORAL RINSE 15 ML: 1.2 LIQUID ORAL at 20:51

## 2022-01-01 RX ADMIN — SODIUM CHLORIDE SOLN NEBU 3% 4 ML: 3 NEBU SOLN at 08:06

## 2022-01-01 RX ADMIN — EPINEPHRINE 1 MG: 0.1 INJECTION, SOLUTION ENDOTRACHEAL; INTRACARDIAC; INTRAVENOUS at 22:49

## 2022-01-01 RX ADMIN — THIAMINE HCL TAB 100 MG 100 MG: 100 TAB at 09:39

## 2022-01-01 RX ADMIN — WHITE PETROLATUM 57.7 %-MINERAL OIL 31.9 % EYE OINTMENT: at 09:00

## 2022-01-01 RX ADMIN — SODIUM CHLORIDE SOLN NEBU 3% 4 ML: 3 NEBU SOLN at 02:15

## 2022-01-01 RX ADMIN — NOREPINEPHRINE BITARTRATE 40 MCG/MIN: 1 INJECTION, SOLUTION, CONCENTRATE INTRAVENOUS at 17:27

## 2022-01-01 RX ADMIN — VANCOMYCIN HYDROCHLORIDE 1750 MG: 5 INJECTION, POWDER, LYOPHILIZED, FOR SOLUTION INTRAVENOUS at 12:04

## 2022-01-01 RX ADMIN — Medication 1.5 MG/HR: at 17:18

## 2022-01-01 RX ADMIN — MIDODRINE HYDROCHLORIDE 10 MG: 5 TABLET ORAL at 16:54

## 2022-01-01 RX ADMIN — LACTULOSE 20 G: 10 SOLUTION ORAL at 17:00

## 2022-01-01 RX ADMIN — SODIUM CHLORIDE SOLN NEBU 3% 4 ML: 3 NEBU SOLN at 13:20

## 2022-01-01 RX ADMIN — EPOPROSTENOL 49.8 NG/KG/MIN: 1.5 INJECTION, POWDER, LYOPHILIZED, FOR SOLUTION INTRAVENOUS at 23:58

## 2022-01-01 RX ADMIN — NOREPINEPHRINE BITARTRATE 80 MCG/MIN: 1 INJECTION, SOLUTION, CONCENTRATE INTRAVENOUS at 13:10

## 2022-01-01 RX ADMIN — NOREPINEPHRINE BITARTRATE 100 MCG/MIN: 1 INJECTION, SOLUTION, CONCENTRATE INTRAVENOUS at 04:38

## 2022-01-01 RX ADMIN — LEVALBUTEROL HYDROCHLORIDE 1.25 MG: 1.25 SOLUTION, CONCENTRATE RESPIRATORY (INHALATION) at 07:20

## 2022-01-01 RX ADMIN — SENNOSIDES AND DOCUSATE SODIUM 2 TABLET: 50; 8.6 TABLET ORAL at 12:09

## 2022-01-01 RX ADMIN — Medication 20000 ML: at 02:10

## 2022-01-01 RX ADMIN — LEVALBUTEROL HYDROCHLORIDE 1.25 MG: 1.25 SOLUTION, CONCENTRATE RESPIRATORY (INHALATION) at 07:05

## 2022-01-01 RX ADMIN — Medication 1 APPLICATION: at 20:38

## 2022-01-01 RX ADMIN — PANTOPRAZOLE SODIUM 40 MG: 40 INJECTION, POWDER, FOR SOLUTION INTRAVENOUS at 09:09

## 2022-01-01 RX ADMIN — EPINEPHRINE 20 MCG/MIN: 1 INJECTION INTRAMUSCULAR; INTRAVENOUS; SUBCUTANEOUS at 00:01

## 2022-01-01 RX ADMIN — METOPROLOL SUCCINATE 25 MG: 25 TABLET, EXTENDED RELEASE ORAL at 17:31

## 2022-01-01 RX ADMIN — CEFEPIME HYDROCHLORIDE 2000 MG: 2 INJECTION, POWDER, FOR SOLUTION INTRAVENOUS at 21:29

## 2022-01-01 RX ADMIN — LORAZEPAM 1 MG: 2 INJECTION INTRAMUSCULAR; INTRAVENOUS at 12:48

## 2022-01-01 RX ADMIN — EPINEPHRINE 19 MCG/MIN: 1 INJECTION INTRAMUSCULAR; INTRAVENOUS; SUBCUTANEOUS at 16:14

## 2022-01-01 RX ADMIN — CALCIUM GLUCONATE 1 G: 20 INJECTION, SOLUTION INTRAVENOUS at 20:30

## 2022-01-01 RX ADMIN — SODIUM CHLORIDE 0.4 MCG/KG/HR: 9 INJECTION, SOLUTION INTRAVENOUS at 07:37

## 2022-01-01 RX ADMIN — CALCIUM GLUCONATE 1 G: 20 INJECTION, SOLUTION INTRAVENOUS at 20:27

## 2022-01-01 RX ADMIN — NOREPINEPHRINE BITARTRATE 70 MCG/MIN: 1 INJECTION, SOLUTION, CONCENTRATE INTRAVENOUS at 15:50

## 2022-01-01 RX ADMIN — EPOPROSTENOL 49.8 NG/KG/MIN: 1.5 INJECTION, POWDER, LYOPHILIZED, FOR SOLUTION INTRAVENOUS at 06:50

## 2022-01-01 RX ADMIN — VANCOMYCIN HYDROCHLORIDE 1750 MG: 5 INJECTION, POWDER, LYOPHILIZED, FOR SOLUTION INTRAVENOUS at 13:41

## 2022-01-01 RX ADMIN — WATER: 1 INJECTION INTRAMUSCULAR; INTRAVENOUS; SUBCUTANEOUS at 05:51

## 2022-01-01 RX ADMIN — NOREPINEPHRINE BITARTRATE 90 MCG/MIN: 1 INJECTION, SOLUTION, CONCENTRATE INTRAVENOUS at 07:13

## 2022-01-01 RX ADMIN — Medication 10 MG/HR: at 16:40

## 2022-01-01 RX ADMIN — NOREPINEPHRINE BITARTRATE 100 MCG/MIN: 1 INJECTION, SOLUTION, CONCENTRATE INTRAVENOUS at 21:36

## 2022-01-01 RX ADMIN — SODIUM BICARBONATE 50 MEQ: 84 INJECTION INTRAVENOUS at 22:47

## 2022-01-01 RX ADMIN — NOREPINEPHRINE BITARTRATE 100 MCG/MIN: 1 INJECTION, SOLUTION, CONCENTRATE INTRAVENOUS at 21:54

## 2022-01-01 RX ADMIN — CALCIUM GLUCONATE 1 G: 20 INJECTION, SOLUTION INTRAVENOUS at 01:43

## 2022-01-01 RX ADMIN — NOREPINEPHRINE BITARTRATE 100 MCG/MIN: 1 INJECTION, SOLUTION, CONCENTRATE INTRAVENOUS at 13:13

## 2022-01-01 RX ADMIN — NOREPINEPHRINE BITARTRATE 90 MCG/MIN: 1 INJECTION, SOLUTION, CONCENTRATE INTRAVENOUS at 05:49

## 2022-01-01 RX ADMIN — VECURONIUM BROMIDE 10 MG: 1 INJECTION, POWDER, LYOPHILIZED, FOR SOLUTION INTRAVENOUS at 06:24

## 2022-01-01 RX ADMIN — CEFTRIAXONE 2000 MG: 2 INJECTION, SOLUTION INTRAVENOUS at 16:07

## 2022-01-01 RX ADMIN — PANTOPRAZOLE SODIUM 40 MG: 40 TABLET, DELAYED RELEASE ORAL at 06:26

## 2022-01-01 RX ADMIN — ALBUMIN (HUMAN) 25 G: 0.25 INJECTION, SOLUTION INTRAVENOUS at 00:00

## 2022-01-01 RX ADMIN — POTASSIUM CHLORIDE 40 MEQ: 400 INJECTION, SOLUTION INTRAVENOUS at 14:30

## 2022-01-01 RX ADMIN — CALCIUM GLUCONATE 2 G: 20 INJECTION, SOLUTION INTRAVENOUS at 23:51

## 2022-01-01 RX ADMIN — CHLORHEXIDINE GLUCONATE 0.12% ORAL RINSE 15 ML: 1.2 LIQUID ORAL at 08:58

## 2022-01-01 RX ADMIN — NOREPINEPHRINE BITARTRATE 90 MCG/MIN: 1 INJECTION, SOLUTION, CONCENTRATE INTRAVENOUS at 12:24

## 2022-01-01 RX ADMIN — LACTULOSE 30 G: 10 SOLUTION ORAL at 09:08

## 2022-01-01 RX ADMIN — OXYCODONE HYDROCHLORIDE 10 MG: 10 TABLET ORAL at 10:48

## 2022-01-01 RX ADMIN — NOREPINEPHRINE BITARTRATE 80 MCG/MIN: 1 INJECTION, SOLUTION, CONCENTRATE INTRAVENOUS at 21:02

## 2022-01-01 RX ADMIN — HYDROMORPHONE HYDROCHLORIDE 0.5 MG: 1 INJECTION, SOLUTION INTRAMUSCULAR; INTRAVENOUS; SUBCUTANEOUS at 17:23

## 2022-01-01 RX ADMIN — OXYCODONE HYDROCHLORIDE 10 MG: 10 TABLET ORAL at 03:29

## 2022-01-01 RX ADMIN — Medication 50 MCG/HR: at 19:32

## 2022-01-01 RX ADMIN — HYDROCORTISONE SODIUM SUCCINATE 50 MG: 100 INJECTION, POWDER, FOR SOLUTION INTRAMUSCULAR; INTRAVENOUS at 18:57

## 2022-01-01 RX ADMIN — WHITE PETROLATUM 57.7 %-MINERAL OIL 31.9 % EYE OINTMENT 1 APPLICATION: at 21:06

## 2022-01-01 RX ADMIN — NOREPINEPHRINE BITARTRATE 100 MCG/MIN: 1 INJECTION, SOLUTION, CONCENTRATE INTRAVENOUS at 23:37

## 2022-01-01 RX ADMIN — ALBUMIN (HUMAN) 25 G: 0.25 INJECTION, SOLUTION INTRAVENOUS at 03:09

## 2022-01-01 RX ADMIN — HEPARIN SODIUM 7500 UNITS: 5000 INJECTION INTRAVENOUS; SUBCUTANEOUS at 21:40

## 2022-01-01 RX ADMIN — SODIUM PHOSPHATE, MONOBASIC, MONOHYDRATE 6 MMOL: 276; 142 INJECTION, SOLUTION INTRAVENOUS at 21:45

## 2022-01-01 RX ADMIN — LEVALBUTEROL HYDROCHLORIDE 1.25 MG: 1.25 SOLUTION, CONCENTRATE RESPIRATORY (INHALATION) at 14:44

## 2022-01-01 RX ADMIN — NOREPINEPHRINE BITARTRATE 80 MCG/MIN: 1 INJECTION, SOLUTION, CONCENTRATE INTRAVENOUS at 15:12

## 2022-01-01 RX ADMIN — DOCUSATE SODIUM AND SENNOSIDES 2 TABLET: 8.6; 5 TABLET, FILM COATED ORAL at 08:14

## 2022-01-01 RX ADMIN — EPINEPHRINE 10 MCG/MIN: 1 INJECTION INTRAMUSCULAR; INTRAVENOUS; SUBCUTANEOUS at 10:24

## 2022-01-01 RX ADMIN — HYDROCORTISONE SODIUM SUCCINATE 50 MG: 100 INJECTION, POWDER, FOR SOLUTION INTRAMUSCULAR; INTRAVENOUS at 05:54

## 2022-01-01 RX ADMIN — DOCUSATE SODIUM AND SENNOSIDES 2 TABLET: 8.6; 5 TABLET, FILM COATED ORAL at 20:53

## 2022-01-01 RX ADMIN — OXYCODONE HYDROCHLORIDE 10 MG: 10 TABLET ORAL at 22:13

## 2022-01-01 RX ADMIN — LEVALBUTEROL HYDROCHLORIDE 1.25 MG: 1.25 SOLUTION, CONCENTRATE RESPIRATORY (INHALATION) at 19:21

## 2022-01-01 RX ADMIN — NOREPINEPHRINE BITARTRATE 100 MCG/MIN: 1 INJECTION, SOLUTION, CONCENTRATE INTRAVENOUS at 17:41

## 2022-01-01 RX ADMIN — ALBUMIN (HUMAN) 25 G: 0.25 INJECTION, SOLUTION INTRAVENOUS at 04:39

## 2022-01-01 RX ADMIN — SODIUM BICARBONATE 50 MEQ: 84 INJECTION INTRAVENOUS at 13:20

## 2022-01-01 RX ADMIN — SODIUM CHLORIDE SOLN NEBU 3% 4 ML: 3 NEBU SOLN at 07:05

## 2022-01-01 RX ADMIN — HYDROCORTISONE SODIUM SUCCINATE 100 MG: 100 INJECTION, POWDER, FOR SOLUTION INTRAMUSCULAR; INTRAVENOUS at 05:26

## 2022-01-01 RX ADMIN — SODIUM CHLORIDE SOLN NEBU 3% 4 ML: 3 NEBU SOLN at 13:50

## 2022-01-01 RX ADMIN — LEVALBUTEROL HYDROCHLORIDE 1.25 MG: 1.25 SOLUTION, CONCENTRATE RESPIRATORY (INHALATION) at 19:24

## 2022-01-01 RX ADMIN — NOREPINEPHRINE BITARTRATE 100 MCG/MIN: 1 INJECTION, SOLUTION, CONCENTRATE INTRAVENOUS at 00:48

## 2022-01-01 RX ADMIN — CHLORHEXIDINE GLUCONATE 0.12% ORAL RINSE 15 ML: 1.2 LIQUID ORAL at 12:56

## 2022-01-01 RX ADMIN — LACTULOSE 20 G: 10 SOLUTION ORAL at 15:59

## 2022-01-01 RX ADMIN — Medication 1 APPLICATION: at 21:30

## 2022-01-01 RX ADMIN — ALUMINA, MAGNESIA, AND SIMETHICONE ORAL SUSPENSION REGULAR STRENGTH 30 ML: 1200; 1200; 120 SUSPENSION ORAL at 20:10

## 2022-01-01 RX ADMIN — FENTANYL CITRATE 50 MCG: 50 INJECTION INTRAMUSCULAR; INTRAVENOUS at 00:53

## 2022-01-01 RX ADMIN — ALBUMIN (HUMAN) 100 G: 0.25 INJECTION, SOLUTION INTRAVENOUS at 08:34

## 2022-01-01 RX ADMIN — WHITE PETROLATUM 57.7 %-MINERAL OIL 31.9 % EYE OINTMENT: at 09:16

## 2022-01-01 RX ADMIN — CHLORHEXIDINE GLUCONATE 0.12% ORAL RINSE 15 ML: 1.2 LIQUID ORAL at 09:30

## 2022-01-01 RX ADMIN — IOHEXOL 100 ML: 350 INJECTION, SOLUTION INTRAVENOUS at 14:14

## 2022-01-01 RX ADMIN — EPINEPHRINE 15 MCG/MIN: 1 INJECTION INTRAMUSCULAR; INTRAVENOUS; SUBCUTANEOUS at 12:33

## 2022-01-01 RX ADMIN — Medication 20000 ML: at 13:16

## 2022-01-01 RX ADMIN — CALCIUM GLUCONATE 1 G: 20 INJECTION, SOLUTION INTRAVENOUS at 05:36

## 2022-01-01 RX ADMIN — WHITE PETROLATUM 57.7 %-MINERAL OIL 31.9 % EYE OINTMENT: at 02:03

## 2022-01-01 RX ADMIN — SODIUM CHLORIDE SOLN NEBU 3% 4 ML: 3 NEBU SOLN at 14:44

## 2022-01-01 RX ADMIN — Medication 20000 ML: at 17:21

## 2022-01-01 RX ADMIN — NOREPINEPHRINE BITARTRATE 100 MCG/MIN: 1 INJECTION, SOLUTION, CONCENTRATE INTRAVENOUS at 09:13

## 2022-01-01 RX ADMIN — OXYCODONE HYDROCHLORIDE 10 MG: 10 TABLET ORAL at 12:55

## 2022-01-01 RX ADMIN — HYDROCORTISONE SODIUM SUCCINATE 50 MG: 100 INJECTION, POWDER, FOR SOLUTION INTRAMUSCULAR; INTRAVENOUS at 05:45

## 2022-01-01 RX ADMIN — NOREPINEPHRINE BITARTRATE 70 MCG/MIN: 1 INJECTION, SOLUTION, CONCENTRATE INTRAVENOUS at 09:43

## 2022-01-01 RX ADMIN — PANTOPRAZOLE SODIUM 40 MG: 40 INJECTION, POWDER, FOR SOLUTION INTRAVENOUS at 08:58

## 2022-01-01 RX ADMIN — PANTOPRAZOLE SODIUM 40 MG: 40 INJECTION, POWDER, FOR SOLUTION INTRAVENOUS at 08:03

## 2022-01-01 RX ADMIN — CEFEPIME HYDROCHLORIDE 2000 MG: 2 INJECTION, POWDER, FOR SOLUTION INTRAVENOUS at 21:57

## 2022-01-01 RX ADMIN — EPINEPHRINE 20 MCG/MIN: 1 INJECTION INTRAMUSCULAR; INTRAVENOUS; SUBCUTANEOUS at 23:18

## 2022-01-01 RX ADMIN — MIDODRINE HYDROCHLORIDE 5 MG: 5 TABLET ORAL at 10:14

## 2022-01-01 RX ADMIN — CALCIUM GLUCONATE 1 G: 20 INJECTION, SOLUTION INTRAVENOUS at 19:51

## 2022-01-01 RX ADMIN — Medication 20000 ML: at 18:09

## 2022-01-01 RX ADMIN — HEPARIN SODIUM 7500 UNITS: 5000 INJECTION INTRAVENOUS; SUBCUTANEOUS at 21:28

## 2022-01-01 RX ADMIN — SODIUM PHOSPHATE, MONOBASIC, MONOHYDRATE 6 MMOL: 276; 142 INJECTION, SOLUTION INTRAVENOUS at 06:21

## 2022-01-01 RX ADMIN — HYDROCORTISONE SODIUM SUCCINATE 100 MG: 100 INJECTION, POWDER, FOR SOLUTION INTRAMUSCULAR; INTRAVENOUS at 21:44

## 2022-01-01 RX ADMIN — NOREPINEPHRINE BITARTRATE 75 MCG/MIN: 1 INJECTION, SOLUTION, CONCENTRATE INTRAVENOUS at 21:07

## 2022-01-01 RX ADMIN — DOCUSATE SODIUM AND SENNOSIDES 2 TABLET: 8.6; 5 TABLET, FILM COATED ORAL at 18:42

## 2022-01-01 RX ADMIN — AMIODARONE HYDROCHLORIDE 0.5 MG/MIN: 50 INJECTION, SOLUTION INTRAVENOUS at 05:39

## 2022-01-01 RX ADMIN — POTASSIUM CHLORIDE 40 MEQ: 29.8 INJECTION, SOLUTION INTRAVENOUS at 01:39

## 2022-01-01 RX ADMIN — WHITE PETROLATUM 57.7 %-MINERAL OIL 31.9 % EYE OINTMENT 1 APPLICATION: at 15:17

## 2022-01-01 RX ADMIN — DOCUSATE SODIUM AND SENNOSIDES 2 TABLET: 8.6; 5 TABLET, FILM COATED ORAL at 12:02

## 2022-01-01 RX ADMIN — SODIUM BICARBONATE 50 MEQ: 84 INJECTION INTRAVENOUS at 22:53

## 2022-01-01 RX ADMIN — POTASSIUM CHLORIDE 40 MEQ: 1500 TABLET, EXTENDED RELEASE ORAL at 09:00

## 2022-01-01 RX ADMIN — WHITE PETROLATUM 57.7 %-MINERAL OIL 31.9 % EYE OINTMENT 1 APPLICATION: at 02:41

## 2022-01-01 RX ADMIN — HEPARIN SODIUM 7500 UNITS: 5000 INJECTION INTRAVENOUS; SUBCUTANEOUS at 15:00

## 2022-01-01 RX ADMIN — SODIUM CHLORIDE 0.4 MCG/KG/HR: 9 INJECTION, SOLUTION INTRAVENOUS at 11:35

## 2022-01-01 RX ADMIN — PANTOPRAZOLE SODIUM 40 MG: 40 INJECTION, POWDER, FOR SOLUTION INTRAVENOUS at 08:34

## 2022-01-01 RX ADMIN — NOREPINEPHRINE BITARTRATE 80 MCG/MIN: 1 INJECTION, SOLUTION, CONCENTRATE INTRAVENOUS at 00:54

## 2022-01-01 RX ADMIN — DIGOXIN 62.5 MCG: 0.25 INJECTION INTRAMUSCULAR; INTRAVENOUS at 08:15

## 2022-01-01 RX ADMIN — NOREPINEPHRINE BITARTRATE 90 MCG/MIN: 1 INJECTION, SOLUTION, CONCENTRATE INTRAVENOUS at 11:47

## 2022-01-01 RX ADMIN — SODIUM CHLORIDE SOLN NEBU 3% 4 ML: 3 NEBU SOLN at 03:03

## 2022-01-01 RX ADMIN — CHLORHEXIDINE GLUCONATE 0.12% ORAL RINSE 15 ML: 1.2 LIQUID ORAL at 08:54

## 2022-01-01 RX ADMIN — MAGNESIUM SULFATE HEPTAHYDRATE 1 G: 1 INJECTION, SOLUTION INTRAVENOUS at 01:45

## 2022-01-01 RX ADMIN — HEPARIN SODIUM 7500 UNITS: 5000 INJECTION INTRAVENOUS; SUBCUTANEOUS at 13:56

## 2022-01-01 RX ADMIN — NOREPINEPHRINE BITARTRATE 100 MCG/MIN: 1 INJECTION, SOLUTION, CONCENTRATE INTRAVENOUS at 11:43

## 2022-01-01 RX ADMIN — WHITE PETROLATUM 57.7 %-MINERAL OIL 31.9 % EYE OINTMENT 1 APPLICATION: at 23:41

## 2022-01-01 RX ADMIN — WHITE PETROLATUM 57.7 %-MINERAL OIL 31.9 % EYE OINTMENT: at 20:36

## 2022-01-01 RX ADMIN — LACTULOSE 20 G: 10 SOLUTION ORAL at 21:57

## 2022-01-01 RX ADMIN — LEVALBUTEROL HYDROCHLORIDE 1.25 MG: 1.25 SOLUTION, CONCENTRATE RESPIRATORY (INHALATION) at 13:05

## 2022-01-01 RX ADMIN — HYDROCORTISONE SODIUM SUCCINATE 50 MG: 100 INJECTION, POWDER, FOR SOLUTION INTRAMUSCULAR; INTRAVENOUS at 17:51

## 2022-01-01 RX ADMIN — LACTULOSE 20 G: 10 SOLUTION ORAL at 09:16

## 2022-01-01 RX ADMIN — VASOPRESSIN 0.04 UNITS/MIN: 20 INJECTION INTRAVENOUS at 08:27

## 2022-01-01 RX ADMIN — SODIUM PHOSPHATE, MONOBASIC, MONOHYDRATE 6 MMOL: 276; 142 INJECTION, SOLUTION INTRAVENOUS at 06:25

## 2022-01-01 RX ADMIN — NOREPINEPHRINE BITARTRATE 80 MCG/MIN: 1 INJECTION, SOLUTION, CONCENTRATE INTRAVENOUS at 15:07

## 2022-01-01 RX ADMIN — LEVALBUTEROL HYDROCHLORIDE 1.25 MG: 1.25 SOLUTION, CONCENTRATE RESPIRATORY (INHALATION) at 08:06

## 2022-01-01 RX ADMIN — WHITE PETROLATUM 57.7 %-MINERAL OIL 31.9 % EYE OINTMENT: at 17:17

## 2022-01-01 RX ADMIN — ALBUMIN (HUMAN) 25 G: 0.25 INJECTION, SOLUTION INTRAVENOUS at 21:16

## 2022-01-01 RX ADMIN — WHITE PETROLATUM 57.7 %-MINERAL OIL 31.9 % EYE OINTMENT: at 07:43

## 2022-01-01 RX ADMIN — SODIUM CHLORIDE SOLN NEBU 3% 4 ML: 3 NEBU SOLN at 10:16

## 2022-01-01 RX ADMIN — LEVALBUTEROL HYDROCHLORIDE 1.25 MG: 1.25 SOLUTION, CONCENTRATE RESPIRATORY (INHALATION) at 19:46

## 2022-01-01 RX ADMIN — CHLORHEXIDINE GLUCONATE 0.12% ORAL RINSE 15 ML: 1.2 LIQUID ORAL at 08:18

## 2022-01-01 RX ADMIN — MIDODRINE HYDROCHLORIDE 15 MG: 5 TABLET ORAL at 21:16

## 2022-01-01 RX ADMIN — POLYETHYLENE GLYCOL 3350 17 G: 17 POWDER, FOR SOLUTION ORAL at 20:30

## 2022-01-01 RX ADMIN — METHYLNALTREXONE BROMIDE 8 MG: 8 INJECTION, SOLUTION SUBCUTANEOUS at 11:43

## 2022-01-01 RX ADMIN — POLYETHYLENE GLYCOL 3350 17 G: 17 POWDER, FOR SOLUTION ORAL at 21:07

## 2022-01-01 RX ADMIN — EPINEPHRINE 7 MCG/MIN: 1 INJECTION INTRAMUSCULAR; INTRAVENOUS; SUBCUTANEOUS at 18:18

## 2022-01-01 RX ADMIN — PHENYLEPHRINE HYDROCHLORIDE 190 MCG/MIN: 10 INJECTION INTRAVENOUS at 12:10

## 2022-01-01 RX ADMIN — SODIUM CHLORIDE SOLN NEBU 3% 4 ML: 3 NEBU SOLN at 07:08

## 2022-01-01 RX ADMIN — SENNOSIDES AND DOCUSATE SODIUM 2 TABLET: 50; 8.6 TABLET ORAL at 17:16

## 2022-01-01 RX ADMIN — Medication 50 MCG/HR: at 16:22

## 2022-01-01 RX ADMIN — ALBUMIN (HUMAN) 25 G: 0.25 INJECTION, SOLUTION INTRAVENOUS at 17:20

## 2022-01-01 RX ADMIN — NOREPINEPHRINE BITARTRATE 70 MCG/MIN: 1 INJECTION, SOLUTION, CONCENTRATE INTRAVENOUS at 11:21

## 2022-01-01 RX ADMIN — NOREPINEPHRINE BITARTRATE 80 MCG/MIN: 1 INJECTION, SOLUTION, CONCENTRATE INTRAVENOUS at 16:58

## 2022-01-01 RX ADMIN — Medication 1 MG/HR: at 03:55

## 2022-01-01 RX ADMIN — CALCIUM GLUCONATE 1 G: 20 INJECTION, SOLUTION INTRAVENOUS at 16:50

## 2022-01-01 RX ADMIN — ALBUTEROL SULFATE 2.5 MG: 2.5 SOLUTION RESPIRATORY (INHALATION) at 19:25

## 2022-01-01 RX ADMIN — Medication 2 MG/HR: at 10:21

## 2022-01-01 RX ADMIN — Medication 1.5 MG/HR: at 01:38

## 2022-01-01 RX ADMIN — LACTULOSE 20 G: 10 SOLUTION ORAL at 21:05

## 2022-01-01 RX ADMIN — WHITE PETROLATUM 57.7 %-MINERAL OIL 31.9 % EYE OINTMENT 1 APPLICATION: at 22:00

## 2022-01-01 RX ADMIN — MAGNESIUM SULFATE HEPTAHYDRATE 1 G: 1 INJECTION, SOLUTION INTRAVENOUS at 14:30

## 2022-01-01 RX ADMIN — NOREPINEPHRINE BITARTRATE 2 MCG/MIN: 1 INJECTION, SOLUTION, CONCENTRATE INTRAVENOUS at 01:45

## 2022-01-01 RX ADMIN — NOREPINEPHRINE BITARTRATE 100 MCG/MIN: 1 INJECTION, SOLUTION, CONCENTRATE INTRAVENOUS at 18:34

## 2022-01-01 RX ADMIN — Medication 1 MG/HR: at 17:12

## 2022-01-01 RX ADMIN — CALCIUM GLUCONATE 1 G: 20 INJECTION, SOLUTION INTRAVENOUS at 13:30

## 2022-01-01 RX ADMIN — Medication 10 MG/HR: at 03:29

## 2022-01-01 RX ADMIN — Medication 20000 ML: at 12:04

## 2022-01-01 RX ADMIN — SODIUM CHLORIDE SOLN NEBU 3% 4 ML: 3 NEBU SOLN at 08:00

## 2022-01-01 RX ADMIN — SODIUM CHLORIDE SOLN NEBU 3% 4 ML: 3 NEBU SOLN at 03:16

## 2022-01-01 RX ADMIN — NOREPINEPHRINE BITARTRATE 100 MCG/MIN: 1 INJECTION, SOLUTION, CONCENTRATE INTRAVENOUS at 13:06

## 2022-01-01 RX ADMIN — VANCOMYCIN HYDROCHLORIDE 1750 MG: 5 INJECTION, POWDER, LYOPHILIZED, FOR SOLUTION INTRAVENOUS at 13:33

## 2022-01-01 RX ADMIN — WHITE PETROLATUM 57.7 %-MINERAL OIL 31.9 % EYE OINTMENT 1 APPLICATION: at 06:09

## 2022-01-01 RX ADMIN — NOREPINEPHRINE BITARTRATE 100 MCG/MIN: 1 INJECTION, SOLUTION, CONCENTRATE INTRAVENOUS at 16:15

## 2022-01-01 RX ADMIN — OXYCODONE HYDROCHLORIDE 10 MG: 10 TABLET ORAL at 09:28

## 2022-01-01 RX ADMIN — POTASSIUM CHLORIDE 20 MEQ: 14.9 INJECTION, SOLUTION INTRAVENOUS at 14:59

## 2022-01-01 RX ADMIN — WHITE PETROLATUM 57.7 %-MINERAL OIL 31.9 % EYE OINTMENT: at 11:44

## 2022-01-01 RX ADMIN — IOHEXOL 50 ML: 300 INJECTION, SOLUTION INTRAVENOUS at 14:32

## 2022-01-01 RX ADMIN — Medication 20000 ML: at 19:49

## 2022-01-01 RX ADMIN — POLYETHYLENE GLYCOL 3350 17 G: 17 POWDER, FOR SOLUTION ORAL at 12:01

## 2022-01-01 RX ADMIN — NOREPINEPHRINE BITARTRATE 100 MCG/MIN: 1 INJECTION, SOLUTION, CONCENTRATE INTRAVENOUS at 18:41

## 2022-01-01 RX ADMIN — VANCOMYCIN HYDROCHLORIDE 1750 MG: 5 INJECTION, POWDER, LYOPHILIZED, FOR SOLUTION INTRAVENOUS at 12:00

## 2022-01-01 RX ADMIN — NOREPINEPHRINE BITARTRATE 100 MCG/MIN: 1 INJECTION, SOLUTION, CONCENTRATE INTRAVENOUS at 09:05

## 2022-01-01 RX ADMIN — WHITE PETROLATUM 57.7 %-MINERAL OIL 31.9 % EYE OINTMENT: at 16:15

## 2022-01-01 RX ADMIN — CALCIUM CHLORIDE 1 G: 100 INJECTION PARENTERAL at 22:54

## 2022-01-01 RX ADMIN — HYDROCORTISONE SODIUM SUCCINATE 50 MG: 100 INJECTION, POWDER, FOR SOLUTION INTRAMUSCULAR; INTRAVENOUS at 23:41

## 2022-01-01 RX ADMIN — LEVALBUTEROL HYDROCHLORIDE 1.25 MG: 1.25 SOLUTION, CONCENTRATE RESPIRATORY (INHALATION) at 13:50

## 2022-01-01 RX ADMIN — POLYETHYLENE GLYCOL 3350 17 G: 17 POWDER, FOR SOLUTION ORAL at 09:30

## 2022-01-01 RX ADMIN — ONDANSETRON 4 MG: 2 INJECTION INTRAMUSCULAR; INTRAVENOUS at 09:39

## 2022-01-01 RX ADMIN — ALBUTEROL SULFATE 2.5 MG: 2.5 SOLUTION RESPIRATORY (INHALATION) at 07:20

## 2022-01-01 RX ADMIN — ONDANSETRON 4 MG: 2 INJECTION INTRAMUSCULAR; INTRAVENOUS at 10:10

## 2022-01-01 RX ADMIN — DOCUSATE SODIUM AND SENNOSIDES 2 TABLET: 8.6; 5 TABLET, FILM COATED ORAL at 22:00

## 2022-01-01 RX ADMIN — OXYCODONE HYDROCHLORIDE 10 MG: 10 TABLET ORAL at 15:03

## 2022-01-01 RX ADMIN — HYDROMORPHONE HYDROCHLORIDE 1 MG: 1 INJECTION, SOLUTION INTRAMUSCULAR; INTRAVENOUS; SUBCUTANEOUS at 21:43

## 2022-01-01 RX ADMIN — NOREPINEPHRINE BITARTRATE 100 MCG/MIN: 1 INJECTION, SOLUTION, CONCENTRATE INTRAVENOUS at 17:15

## 2022-01-01 RX ADMIN — VASOPRESSIN 0.04 UNITS/MIN: 20 INJECTION INTRAVENOUS at 23:14

## 2022-01-01 RX ADMIN — WHITE PETROLATUM 57.7 %-MINERAL OIL 31.9 % EYE OINTMENT 1 APPLICATION: at 18:29

## 2022-01-01 RX ADMIN — ALBUMIN (HUMAN) 25 G: 0.25 INJECTION, SOLUTION INTRAVENOUS at 17:23

## 2022-01-01 RX ADMIN — NOREPINEPHRINE BITARTRATE 100 MCG/MIN: 1 INJECTION, SOLUTION, CONCENTRATE INTRAVENOUS at 03:20

## 2022-01-01 RX ADMIN — HYDROCORTISONE SODIUM SUCCINATE 50 MG: 100 INJECTION, POWDER, FOR SOLUTION INTRAMUSCULAR; INTRAVENOUS at 12:21

## 2022-01-01 RX ADMIN — WHITE PETROLATUM 57.7 %-MINERAL OIL 31.9 % EYE OINTMENT: at 04:00

## 2022-01-01 RX ADMIN — AMIODARONE HYDROCHLORIDE 0.5 MG/MIN: 50 INJECTION, SOLUTION INTRAVENOUS at 16:14

## 2022-01-01 RX ADMIN — TRIMETHOBENZAMIDE HYDROCHLORIDE 200 MG: 100 INJECTION INTRAMUSCULAR at 16:15

## 2022-01-01 RX ADMIN — NOREPINEPHRINE BITARTRATE 90 MCG/MIN: 1 INJECTION, SOLUTION, CONCENTRATE INTRAVENOUS at 10:24

## 2022-01-01 RX ADMIN — SODIUM CHLORIDE SOLN NEBU 3% 4 ML: 3 NEBU SOLN at 19:06

## 2022-01-01 RX ADMIN — WATER 10 ML: 1 INJECTION INTRAMUSCULAR; INTRAVENOUS; SUBCUTANEOUS at 02:48

## 2022-01-01 RX ADMIN — SODIUM BICARBONATE 1 MEQ: 84 INJECTION INTRAVENOUS at 07:30

## 2022-01-01 RX ADMIN — Medication 20000 ML: at 14:37

## 2022-01-01 RX ADMIN — VASOPRESSIN 0.04 UNITS/MIN: 20 INJECTION INTRAVENOUS at 07:00

## 2022-01-01 RX ADMIN — VASOPRESSIN 0.04 UNITS/MIN: 20 INJECTION INTRAVENOUS at 18:45

## 2022-01-01 RX ADMIN — HYDROMORPHONE HYDROCHLORIDE 1 MG: 1 INJECTION, SOLUTION INTRAMUSCULAR; INTRAVENOUS; SUBCUTANEOUS at 12:45

## 2022-01-01 RX ADMIN — DIGOXIN 250 MCG: 0.25 INJECTION INTRAMUSCULAR; INTRAVENOUS at 17:16

## 2022-01-01 RX ADMIN — LEVALBUTEROL HYDROCHLORIDE 1.25 MG: 1.25 SOLUTION, CONCENTRATE RESPIRATORY (INHALATION) at 14:12

## 2022-01-01 RX ADMIN — WHITE PETROLATUM 57.7 %-MINERAL OIL 31.9 % EYE OINTMENT: at 11:27

## 2022-01-01 RX ADMIN — SODIUM CHLORIDE SOLN NEBU 3% 4 ML: 3 NEBU SOLN at 03:05

## 2022-01-01 RX ADMIN — NOREPINEPHRINE BITARTRATE 100 MCG/MIN: 1 INJECTION, SOLUTION, CONCENTRATE INTRAVENOUS at 00:26

## 2022-01-01 RX ADMIN — EPINEPHRINE 19 MCG/MIN: 1 INJECTION INTRAMUSCULAR; INTRAVENOUS; SUBCUTANEOUS at 10:06

## 2022-01-01 RX ADMIN — NOREPINEPHRINE BITARTRATE 100 MCG/MIN: 1 INJECTION, SOLUTION, CONCENTRATE INTRAVENOUS at 06:22

## 2022-01-01 RX ADMIN — Medication 20000 ML: at 08:00

## 2022-01-01 RX ADMIN — LACTULOSE 30 G: 10 SOLUTION ORAL at 21:07

## 2022-01-01 RX ADMIN — POTASSIUM CHLORIDE 40 MEQ: 1500 TABLET, EXTENDED RELEASE ORAL at 12:58

## 2022-01-01 RX ADMIN — AMIODARONE HYDROCHLORIDE 1 MG/MIN: 50 INJECTION, SOLUTION INTRAVENOUS at 15:33

## 2022-01-01 RX ADMIN — MIDODRINE HYDROCHLORIDE 5 MG: 5 TABLET ORAL at 06:17

## 2022-01-01 RX ADMIN — POLYETHYLENE GLYCOL 3350 17 G: 17 POWDER, FOR SOLUTION ORAL at 21:51

## 2022-01-01 RX ADMIN — SODIUM PHOSPHATE, MONOBASIC, MONOHYDRATE 6 MMOL: 276; 142 INJECTION, SOLUTION INTRAVENOUS at 07:08

## 2022-01-01 RX ADMIN — POLYETHYLENE GLYCOL 3350 17 G: 17 POWDER, FOR SOLUTION ORAL at 08:18

## 2022-01-01 RX ADMIN — LEVALBUTEROL HYDROCHLORIDE 1.25 MG: 1.25 SOLUTION, CONCENTRATE RESPIRATORY (INHALATION) at 07:50

## 2022-01-01 RX ADMIN — Medication 20000 ML: at 07:33

## 2022-01-01 RX ADMIN — CHLORHEXIDINE GLUCONATE 0.12% ORAL RINSE 15 ML: 1.2 LIQUID ORAL at 20:16

## 2022-01-01 RX ADMIN — VANCOMYCIN HYDROCHLORIDE 1750 MG: 5 INJECTION, POWDER, LYOPHILIZED, FOR SOLUTION INTRAVENOUS at 11:32

## 2022-01-01 RX ADMIN — WHITE PETROLATUM 57.7 %-MINERAL OIL 31.9 % EYE OINTMENT 1 APPLICATION: at 11:01

## 2022-01-01 RX ADMIN — ALBUTEROL SULFATE 2.5 MG: 2.5 SOLUTION RESPIRATORY (INHALATION) at 02:40

## 2022-01-01 RX ADMIN — MIDODRINE HYDROCHLORIDE 5 MG: 5 TABLET ORAL at 15:04

## 2022-01-01 RX ADMIN — NOREPINEPHRINE BITARTRATE 100 MCG/MIN: 1 INJECTION, SOLUTION, CONCENTRATE INTRAVENOUS at 06:08

## 2022-01-01 RX ADMIN — NOREPINEPHRINE BITARTRATE 90 MCG/MIN: 1 INJECTION, SOLUTION, CONCENTRATE INTRAVENOUS at 04:04

## 2022-01-01 RX ADMIN — FENTANYL CITRATE 50 MCG: 50 INJECTION, SOLUTION INTRAMUSCULAR; INTRAVENOUS at 01:28

## 2022-01-01 RX ADMIN — POLYETHYLENE GLYCOL 3350 17 G: 17 POWDER, FOR SOLUTION ORAL at 20:51

## 2022-01-01 RX ADMIN — VASOPRESSIN 0.04 UNITS/MIN: 20 INJECTION INTRAVENOUS at 02:39

## 2022-01-01 RX ADMIN — CEFEPIME HYDROCHLORIDE 2000 MG: 2 INJECTION, POWDER, FOR SOLUTION INTRAVENOUS at 09:08

## 2022-01-01 RX ADMIN — Medication 2 MG/HR: at 06:40

## 2022-01-01 RX ADMIN — HEPARIN SODIUM 7500 UNITS: 5000 INJECTION INTRAVENOUS; SUBCUTANEOUS at 06:26

## 2022-01-01 RX ADMIN — OXYCODONE HYDROCHLORIDE 10 MG: 10 TABLET ORAL at 01:21

## 2022-01-01 RX ADMIN — NOREPINEPHRINE BITARTRATE 70 MCG/MIN: 1 INJECTION, SOLUTION, CONCENTRATE INTRAVENOUS at 07:34

## 2022-01-01 RX ADMIN — MAGNESIUM SULFATE HEPTAHYDRATE 1 G: 1 INJECTION, SOLUTION INTRAVENOUS at 17:00

## 2022-01-01 RX ADMIN — EPINEPHRINE 10 MCG/MIN: 1 INJECTION INTRAMUSCULAR; INTRAVENOUS; SUBCUTANEOUS at 10:28

## 2022-01-01 RX ADMIN — NOREPINEPHRINE BITARTRATE 60 MCG/MIN: 1 INJECTION, SOLUTION, CONCENTRATE INTRAVENOUS at 06:58

## 2022-01-01 RX ADMIN — LACTULOSE 30 G: 10 SOLUTION ORAL at 08:03

## 2022-01-01 RX ADMIN — METOLAZONE 10 MG: 2.5 TABLET ORAL at 12:55

## 2022-01-01 RX ADMIN — CEFEPIME HYDROCHLORIDE 2000 MG: 2 INJECTION, POWDER, FOR SOLUTION INTRAVENOUS at 10:38

## 2022-01-01 RX ADMIN — NOREPINEPHRINE BITARTRATE 100 MCG/MIN: 1 INJECTION, SOLUTION, CONCENTRATE INTRAVENOUS at 01:59

## 2022-01-01 RX ADMIN — Medication 50 MEQ: at 18:31

## 2022-01-01 RX ADMIN — DOCUSATE SODIUM AND SENNOSIDES 2 TABLET: 8.6; 5 TABLET, FILM COATED ORAL at 21:19

## 2022-01-01 RX ADMIN — METOPROLOL SUCCINATE 25 MG: 25 TABLET, EXTENDED RELEASE ORAL at 18:12

## 2022-01-01 RX ADMIN — NOREPINEPHRINE BITARTRATE 80 MCG/MIN: 1 INJECTION, SOLUTION, CONCENTRATE INTRAVENOUS at 19:14

## 2022-01-01 RX ADMIN — Medication 2 MG/HR: at 23:19

## 2022-01-01 RX ADMIN — VASOPRESSIN 0.04 UNITS/MIN: 20 INJECTION INTRAVENOUS at 09:35

## 2022-01-01 RX ADMIN — CALCIUM GLUCONATE 1 G: 20 INJECTION, SOLUTION INTRAVENOUS at 23:27

## 2022-01-01 RX ADMIN — WHITE PETROLATUM 57.7 %-MINERAL OIL 31.9 % EYE OINTMENT: at 02:00

## 2022-01-01 RX ADMIN — ALBUMIN (HUMAN) 50 G: 0.25 INJECTION, SOLUTION INTRAVENOUS at 06:23

## 2022-01-01 RX ADMIN — WHITE PETROLATUM 57.7 %-MINERAL OIL 31.9 % EYE OINTMENT: at 10:28

## 2022-01-01 RX ADMIN — POLYETHYLENE GLYCOL 3350 17 G: 17 POWDER, FOR SOLUTION ORAL at 20:16

## 2022-01-01 RX ADMIN — EPINEPHRINE 12 MCG/MIN: 1 INJECTION INTRAMUSCULAR; INTRAVENOUS; SUBCUTANEOUS at 04:24

## 2022-01-01 RX ADMIN — Medication 1 APPLICATION: at 08:54

## 2022-01-01 RX ADMIN — NOREPINEPHRINE BITARTRATE 90.67 MCG/MIN: 1 INJECTION, SOLUTION, CONCENTRATE INTRAVENOUS at 04:35

## 2022-01-01 RX ADMIN — NOREPINEPHRINE BITARTRATE 100 MCG/MIN: 1 INJECTION, SOLUTION, CONCENTRATE INTRAVENOUS at 16:38

## 2022-01-01 RX ADMIN — POLYETHYLENE GLYCOL 3350 17 G: 17 POWDER, FOR SOLUTION ORAL at 08:14

## 2022-01-01 RX ADMIN — NOREPINEPHRINE BITARTRATE 100 MCG/MIN: 1 INJECTION, SOLUTION, CONCENTRATE INTRAVENOUS at 13:45

## 2022-01-01 RX ADMIN — VASOPRESSIN 0.04 UNITS/MIN: 20 INJECTION INTRAVENOUS at 17:00

## 2022-01-01 RX ADMIN — DEXTROSE 150 MG: 50 INJECTION, SOLUTION INTRAVENOUS at 13:30

## 2022-01-01 RX ADMIN — Medication 20000 ML: at 08:19

## 2022-01-01 RX ADMIN — LEVALBUTEROL HYDROCHLORIDE 1.25 MG: 1.25 SOLUTION, CONCENTRATE RESPIRATORY (INHALATION) at 23:12

## 2022-01-01 RX ADMIN — EPINEPHRINE 20 MCG/MIN: 1 INJECTION INTRAMUSCULAR; INTRAVENOUS; SUBCUTANEOUS at 18:49

## 2022-01-01 RX ADMIN — CHLORHEXIDINE GLUCONATE 0.12% ORAL RINSE 15 ML: 1.2 LIQUID ORAL at 20:25

## 2022-01-01 RX ADMIN — Medication 20000 ML: at 04:59

## 2022-01-01 RX ADMIN — CALCIUM GLUCONATE 2 G: 20 INJECTION, SOLUTION INTRAVENOUS at 02:21

## 2022-01-01 RX ADMIN — POTASSIUM CHLORIDE 20 MEQ: 14.9 INJECTION, SOLUTION INTRAVENOUS at 18:57

## 2022-01-01 RX ADMIN — ALBUTEROL SULFATE 20 MG: 2.5 SOLUTION RESPIRATORY (INHALATION) at 04:03

## 2022-01-01 RX ADMIN — CHLORHEXIDINE GLUCONATE 0.12% ORAL RINSE 15 ML: 1.2 LIQUID ORAL at 21:07

## 2022-01-01 RX ADMIN — CEFTRIAXONE 1000 MG: 1 INJECTION, SOLUTION INTRAVENOUS at 15:07

## 2022-01-01 RX ADMIN — WHITE PETROLATUM 57.7 %-MINERAL OIL 31.9 % EYE OINTMENT 1 APPLICATION: at 23:40

## 2022-01-01 RX ADMIN — NOREPINEPHRINE BITARTRATE 100 MCG/MIN: 1 INJECTION, SOLUTION, CONCENTRATE INTRAVENOUS at 15:15

## 2022-01-01 RX ADMIN — ALBUMIN (HUMAN) 25 G: 0.25 INJECTION, SOLUTION INTRAVENOUS at 17:24

## 2022-01-01 RX ADMIN — PANTOPRAZOLE SODIUM 40 MG: 40 INJECTION, POWDER, FOR SOLUTION INTRAVENOUS at 08:14

## 2022-01-01 RX ADMIN — NOREPINEPHRINE BITARTRATE 80 MCG/MIN: 1 INJECTION, SOLUTION, CONCENTRATE INTRAVENOUS at 11:43

## 2022-01-01 RX ADMIN — SODIUM CHLORIDE, SODIUM GLUCONATE, SODIUM ACETATE, POTASSIUM CHLORIDE, MAGNESIUM CHLORIDE, SODIUM PHOSPHATE, DIBASIC, AND POTASSIUM PHOSPHATE 500 ML: .53; .5; .37; .037; .03; .012; .00082 INJECTION, SOLUTION INTRAVENOUS at 05:50

## 2022-01-01 RX ADMIN — MIDODRINE HYDROCHLORIDE 10 MG: 5 TABLET ORAL at 06:26

## 2022-01-01 RX ADMIN — Medication 20000 ML: at 19:35

## 2022-01-01 RX ADMIN — SODIUM BICARBONATE: 84 INJECTION INTRAVENOUS at 20:49

## 2022-01-01 RX ADMIN — SODIUM BICARBONATE 50 MEQ: 84 INJECTION INTRAVENOUS at 18:31

## 2022-01-01 RX ADMIN — CHLORHEXIDINE GLUCONATE 0.12% ORAL RINSE 15 ML: 1.2 LIQUID ORAL at 22:13

## 2022-01-01 RX ADMIN — LEVALBUTEROL HYDROCHLORIDE 1.25 MG: 1.25 SOLUTION, CONCENTRATE RESPIRATORY (INHALATION) at 20:23

## 2022-01-01 RX ADMIN — MAGNESIUM SULFATE HEPTAHYDRATE 1 G: 1 INJECTION, SOLUTION INTRAVENOUS at 12:58

## 2022-01-01 RX ADMIN — HEPARIN SODIUM 500 UNITS/HR: 10000 INJECTION, SOLUTION INTRAVENOUS; SUBCUTANEOUS at 04:53

## 2022-01-01 RX ADMIN — NOREPINEPHRINE BITARTRATE 100 MCG/MIN: 1 INJECTION, SOLUTION, CONCENTRATE INTRAVENOUS at 17:49

## 2022-01-01 RX ADMIN — VASOPRESSIN 0.04 UNITS/MIN: 20 INJECTION INTRAVENOUS at 23:23

## 2022-01-01 RX ADMIN — ALBUTEROL SULFATE 2.5 MG: 2.5 SOLUTION RESPIRATORY (INHALATION) at 03:16

## 2022-01-01 RX ADMIN — OXYCODONE HYDROCHLORIDE 10 MG: 10 TABLET ORAL at 21:27

## 2022-01-01 RX ADMIN — METOPROLOL SUCCINATE 25 MG: 25 TABLET, EXTENDED RELEASE ORAL at 10:18

## 2022-01-01 RX ADMIN — ALBUMIN (HUMAN) 25 G: 12.5 SOLUTION INTRAVENOUS at 12:01

## 2022-01-01 RX ADMIN — LEVALBUTEROL HYDROCHLORIDE 1.25 MG: 1.25 SOLUTION, CONCENTRATE RESPIRATORY (INHALATION) at 13:20

## 2022-01-01 RX ADMIN — OXYCODONE HYDROCHLORIDE 10 MG: 10 TABLET ORAL at 03:18

## 2022-01-01 RX ADMIN — ALBUMIN (HUMAN) 25 G: 0.25 INJECTION, SOLUTION INTRAVENOUS at 11:34

## 2022-01-01 RX ADMIN — Medication 50 MEQ: at 13:20

## 2022-01-01 RX ADMIN — NOREPINEPHRINE BITARTRATE 100 MCG/MIN: 1 INJECTION, SOLUTION, CONCENTRATE INTRAVENOUS at 04:53

## 2022-01-01 RX ADMIN — MIDODRINE HYDROCHLORIDE 10 MG: 5 TABLET ORAL at 21:27

## 2022-01-01 RX ADMIN — CALCIUM GLUCONATE 1 G: 20 INJECTION, SOLUTION INTRAVENOUS at 13:21

## 2022-01-01 RX ADMIN — NOREPINEPHRINE BITARTRATE 100 MCG/MIN: 1 INJECTION, SOLUTION, CONCENTRATE INTRAVENOUS at 00:30

## 2022-01-01 RX ADMIN — FUROSEMIDE 40 MG: 10 INJECTION, SOLUTION INTRAMUSCULAR; INTRAVENOUS at 15:13

## 2022-01-01 RX ADMIN — Medication 20000 ML: at 13:48

## 2022-01-01 RX ADMIN — CHLORHEXIDINE GLUCONATE 0.12% ORAL RINSE 15 ML: 1.2 LIQUID ORAL at 09:08

## 2022-01-01 RX ADMIN — NOREPINEPHRINE BITARTRATE 100 MCG/MIN: 1 INJECTION, SOLUTION, CONCENTRATE INTRAVENOUS at 22:19

## 2022-01-01 RX ADMIN — POTASSIUM CHLORIDE 40 MEQ: 1500 TABLET, EXTENDED RELEASE ORAL at 21:45

## 2022-01-01 RX ADMIN — DOCUSATE SODIUM AND SENNOSIDES 2 TABLET: 8.6; 5 TABLET, FILM COATED ORAL at 08:18

## 2022-01-01 RX ADMIN — CHLORHEXIDINE GLUCONATE 0.12% ORAL RINSE 15 ML: 1.2 LIQUID ORAL at 08:14

## 2022-01-01 RX ADMIN — WHITE PETROLATUM 57.7 %-MINERAL OIL 31.9 % EYE OINTMENT: at 15:07

## 2022-01-01 RX ADMIN — LORAZEPAM 2 MG: 2 INJECTION INTRAMUSCULAR; INTRAVENOUS at 13:28

## 2022-01-01 RX ADMIN — VASOPRESSIN 0.04 UNITS/MIN: 20 INJECTION INTRAVENOUS at 21:53

## 2022-01-01 RX ADMIN — NOREPINEPHRINE BITARTRATE 30 MCG/MIN: 1 INJECTION, SOLUTION, CONCENTRATE INTRAVENOUS at 02:47

## 2022-01-01 RX ADMIN — DOCUSATE SODIUM AND SENNOSIDES 2 TABLET: 8.6; 5 TABLET, FILM COATED ORAL at 08:58

## 2022-01-01 RX ADMIN — NOREPINEPHRINE BITARTRATE 80 MCG/MIN: 1 INJECTION, SOLUTION, CONCENTRATE INTRAVENOUS at 17:07

## 2022-01-01 RX ADMIN — SENNOSIDES AND DOCUSATE SODIUM 2 TABLET: 50; 8.6 TABLET ORAL at 17:30

## 2022-01-01 RX ADMIN — CALCIUM GLUCONATE 1 G: 20 INJECTION, SOLUTION INTRAVENOUS at 06:32

## 2022-01-01 RX ADMIN — SODIUM BICARBONATE 50 MEQ: 84 INJECTION INTRAVENOUS at 03:55

## 2022-01-01 RX ADMIN — SODIUM CHLORIDE 0.4 MCG/KG/HR: 9 INJECTION, SOLUTION INTRAVENOUS at 04:07

## 2022-01-01 RX ADMIN — NOREPINEPHRINE BITARTRATE 40 MCG/MIN: 1 INJECTION, SOLUTION, CONCENTRATE INTRAVENOUS at 04:54

## 2022-01-01 RX ADMIN — EPINEPHRINE 10 MCG/MIN: 1 INJECTION INTRAMUSCULAR; INTRAVENOUS; SUBCUTANEOUS at 00:30

## 2022-01-01 RX ADMIN — Medication 20000 ML: at 18:38

## 2022-01-01 RX ADMIN — WHITE PETROLATUM 57.7 %-MINERAL OIL 31.9 % EYE OINTMENT 1 APPLICATION: at 19:35

## 2022-01-01 RX ADMIN — CALCIUM GLUCONATE 1 G: 20 INJECTION, SOLUTION INTRAVENOUS at 03:30

## 2022-01-01 RX ADMIN — HEPARIN SODIUM 500 UNITS/HR: 10000 INJECTION, SOLUTION INTRAVENOUS; SUBCUTANEOUS at 16:25

## 2022-01-01 RX ADMIN — EPINEPHRINE 12 MCG/MIN: 1 INJECTION INTRAMUSCULAR; INTRAVENOUS; SUBCUTANEOUS at 22:25

## 2022-01-01 RX ADMIN — PANTOPRAZOLE SODIUM 40 MG: 40 TABLET, DELAYED RELEASE ORAL at 05:47

## 2022-01-01 RX ADMIN — ONDANSETRON 4 MG: 2 INJECTION INTRAMUSCULAR; INTRAVENOUS at 21:19

## 2022-01-01 RX ADMIN — ALBUMIN (HUMAN) 25 G: 0.25 INJECTION, SOLUTION INTRAVENOUS at 05:01

## 2022-01-01 RX ADMIN — PANTOPRAZOLE SODIUM 40 MG: 40 INJECTION, POWDER, FOR SOLUTION INTRAVENOUS at 09:30

## 2022-01-01 RX ADMIN — SODIUM CHLORIDE SOLN NEBU 3% 4 ML: 3 NEBU SOLN at 19:24

## 2022-01-01 RX ADMIN — VASOPRESSIN 0.04 UNITS/MIN: 20 INJECTION INTRAVENOUS at 06:39

## 2022-01-01 RX ADMIN — BISACODYL 10 MG: 10 SUPPOSITORY RECTAL at 08:03

## 2022-01-01 RX ADMIN — Medication 1.5 MG/HR: at 11:17

## 2022-01-01 RX ADMIN — ALBUTEROL SULFATE 2.5 MG: 2.5 SOLUTION RESPIRATORY (INHALATION) at 02:59

## 2022-01-01 RX ADMIN — POTASSIUM CHLORIDE 20 MEQ: 14.9 INJECTION, SOLUTION INTRAVENOUS at 16:18

## 2022-01-01 RX ADMIN — DIGOXIN 500 MCG: 0.25 INJECTION INTRAMUSCULAR; INTRAVENOUS at 11:57

## 2022-01-01 RX ADMIN — Medication: at 22:00

## 2022-01-01 RX ADMIN — HEPARIN SODIUM 7500 UNITS: 5000 INJECTION INTRAVENOUS; SUBCUTANEOUS at 05:07

## 2022-01-01 RX ADMIN — VECURONIUM BROMIDE 10 MG: 1 INJECTION, POWDER, LYOPHILIZED, FOR SOLUTION INTRAVENOUS at 02:49

## 2022-01-01 RX ADMIN — PHENYLEPHRINE HYDROCHLORIDE 80 MCG/MIN: 10 INJECTION INTRAVENOUS at 23:09

## 2022-01-01 RX ADMIN — ALBUMIN (HUMAN) 25 G: 0.25 INJECTION, SOLUTION INTRAVENOUS at 04:48

## 2022-01-01 RX ADMIN — HYDROMORPHONE HYDROCHLORIDE 0.5 MG: 1 INJECTION, SOLUTION INTRAMUSCULAR; INTRAVENOUS; SUBCUTANEOUS at 01:29

## 2022-01-01 RX ADMIN — CALCIUM GLUCONATE 1 G: 20 INJECTION, SOLUTION INTRAVENOUS at 19:01

## 2022-01-01 RX ADMIN — METOPROLOL SUCCINATE 25 MG: 25 TABLET, EXTENDED RELEASE ORAL at 21:30

## 2022-01-01 RX ADMIN — VASOPRESSIN 0.04 UNITS/MIN: 20 INJECTION INTRAVENOUS at 14:50

## 2022-01-01 RX ADMIN — WHITE PETROLATUM 57.7 %-MINERAL OIL 31.9 % EYE OINTMENT: at 00:21

## 2022-01-01 RX ADMIN — NOREPINEPHRINE BITARTRATE 100 MCG/MIN: 1 INJECTION, SOLUTION, CONCENTRATE INTRAVENOUS at 10:15

## 2022-01-01 RX ADMIN — NOREPINEPHRINE BITARTRATE 40 MCG/MIN: 1 INJECTION, SOLUTION, CONCENTRATE INTRAVENOUS at 00:54

## 2022-01-01 RX ADMIN — CISATRACURIUM BESYLATE 2 MCG/KG/MIN: 10 INJECTION, SOLUTION INTRAVENOUS at 08:06

## 2022-01-01 RX ADMIN — POTASSIUM CHLORIDE 20 MEQ: 200 INJECTION, SOLUTION INTRAVENOUS at 19:40

## 2022-01-01 RX ADMIN — CEFEPIME HYDROCHLORIDE 2000 MG: 2 INJECTION, POWDER, FOR SOLUTION INTRAVENOUS at 09:45

## 2022-01-01 RX ADMIN — OXYCODONE HYDROCHLORIDE 5 MG: 5 TABLET ORAL at 18:46

## 2022-01-01 RX ADMIN — NOREPINEPHRINE BITARTRATE 90 MCG/MIN: 1 INJECTION, SOLUTION, CONCENTRATE INTRAVENOUS at 07:38

## 2022-01-01 RX ADMIN — SODIUM CHLORIDE SOLN NEBU 3% 4 ML: 3 NEBU SOLN at 07:50

## 2022-01-01 RX ADMIN — ALBUTEROL SULFATE 2.5 MG: 2.5 SOLUTION RESPIRATORY (INHALATION) at 19:06

## 2022-01-01 RX ADMIN — SODIUM BICARBONATE 50 MEQ: 84 INJECTION INTRAVENOUS at 18:32

## 2022-01-01 RX ADMIN — Medication 1 APPLICATION: at 09:32

## 2022-01-01 RX ADMIN — AMIODARONE HYDROCHLORIDE 0.5 MG/MIN: 50 INJECTION, SOLUTION INTRAVENOUS at 16:55

## 2022-01-01 RX ADMIN — EPINEPHRINE 20 MCG/MIN: 1 INJECTION INTRAMUSCULAR; INTRAVENOUS; SUBCUTANEOUS at 18:59

## 2022-01-01 RX ADMIN — HYDROCORTISONE SODIUM SUCCINATE 50 MG: 100 INJECTION, POWDER, FOR SOLUTION INTRAMUSCULAR; INTRAVENOUS at 23:22

## 2022-01-01 RX ADMIN — Medication 20000 ML: at 09:58

## 2022-01-01 RX ADMIN — HYDROCORTISONE SODIUM SUCCINATE 50 MG: 100 INJECTION, POWDER, FOR SOLUTION INTRAMUSCULAR; INTRAVENOUS at 05:06

## 2022-01-01 RX ADMIN — ALBUMIN (HUMAN) 25 G: 0.25 INJECTION, SOLUTION INTRAVENOUS at 11:02

## 2022-01-01 RX ADMIN — SIMETHICONE 80 MG: 80 TABLET, CHEWABLE ORAL at 13:40

## 2022-01-01 RX ADMIN — Medication 20000 ML: at 13:13

## 2022-01-01 RX ADMIN — Medication 50 MCG/HR: at 09:31

## 2022-01-01 RX ADMIN — WHITE PETROLATUM 57.7 %-MINERAL OIL 31.9 % EYE OINTMENT: at 17:23

## 2022-01-01 RX ADMIN — VASOPRESSIN 0.04 UNITS/MIN: 20 INJECTION INTRAVENOUS at 10:25

## 2022-01-01 RX ADMIN — NOREPINEPHRINE BITARTRATE 80 MCG/MIN: 1 INJECTION, SOLUTION, CONCENTRATE INTRAVENOUS at 02:21

## 2022-01-01 RX ADMIN — EPINEPHRINE 12 MCG/MIN: 1 INJECTION INTRAMUSCULAR; INTRAVENOUS; SUBCUTANEOUS at 13:45

## 2022-01-01 RX ADMIN — EPINEPHRINE 16 MCG/MIN: 1 INJECTION INTRAMUSCULAR; INTRAVENOUS; SUBCUTANEOUS at 07:36

## 2022-01-01 RX ADMIN — ALBUMIN (HUMAN) 25 G: 0.25 INJECTION, SOLUTION INTRAVENOUS at 23:22

## 2022-01-01 RX ADMIN — WHITE PETROLATUM 57.7 %-MINERAL OIL 31.9 % EYE OINTMENT: at 09:46

## 2022-01-01 RX ADMIN — LACTULOSE 20 G: 10 SOLUTION ORAL at 08:18

## 2022-01-01 RX ADMIN — CALCIUM GLUCONATE 1 G: 20 INJECTION, SOLUTION INTRAVENOUS at 00:45

## 2022-01-01 RX ADMIN — POTASSIUM CHLORIDE 40 MEQ: 1500 TABLET, EXTENDED RELEASE ORAL at 17:35

## 2022-01-01 RX ADMIN — HYDROMORPHONE HYDROCHLORIDE 1 MG: 1 INJECTION, SOLUTION INTRAMUSCULAR; INTRAVENOUS; SUBCUTANEOUS at 19:38

## 2022-01-01 RX ADMIN — Medication 1 MG/HR: at 05:49

## 2022-01-01 RX ADMIN — WHITE PETROLATUM 57.7 %-MINERAL OIL 31.9 % EYE OINTMENT: at 09:13

## 2022-01-01 RX ADMIN — PHENYLEPHRINE HYDROCHLORIDE 200 MCG/MIN: 10 INJECTION INTRAVENOUS at 03:37

## 2022-01-01 RX ADMIN — NOREPINEPHRINE BITARTRATE 100 MCG/MIN: 1 INJECTION, SOLUTION, CONCENTRATE INTRAVENOUS at 17:35

## 2022-01-01 RX ADMIN — POTASSIUM CHLORIDE 40 MEQ: 29.8 INJECTION, SOLUTION INTRAVENOUS at 13:31

## 2022-01-01 RX ADMIN — NOREPINEPHRINE BITARTRATE 84 MCG/MIN: 1 INJECTION, SOLUTION, CONCENTRATE INTRAVENOUS at 22:50

## 2022-01-01 RX ADMIN — NOREPINEPHRINE BITARTRATE 100 MCG/MIN: 1 INJECTION, SOLUTION, CONCENTRATE INTRAVENOUS at 10:40

## 2022-01-01 RX ADMIN — NOREPINEPHRINE BITARTRATE 40 MCG/MIN: 1 INJECTION, SOLUTION, CONCENTRATE INTRAVENOUS at 21:16

## 2022-01-01 RX ADMIN — PANTOPRAZOLE SODIUM 40 MG: 40 INJECTION, POWDER, FOR SOLUTION INTRAVENOUS at 08:50

## 2022-01-01 RX ADMIN — WHITE PETROLATUM 57.7 %-MINERAL OIL 31.9 % EYE OINTMENT: at 15:12

## 2022-01-01 RX ADMIN — HEPARIN SODIUM 7500 UNITS: 5000 INJECTION INTRAVENOUS; SUBCUTANEOUS at 13:58

## 2022-01-01 RX ADMIN — EPINEPHRINE 12 MCG/MIN: 1 INJECTION INTRAMUSCULAR; INTRAVENOUS; SUBCUTANEOUS at 13:08

## 2022-01-01 RX ADMIN — CHLORHEXIDINE GLUCONATE 0.12% ORAL RINSE 15 ML: 1.2 LIQUID ORAL at 08:50

## 2022-01-01 RX ADMIN — LEVALBUTEROL HYDROCHLORIDE 1.25 MG: 1.25 SOLUTION, CONCENTRATE RESPIRATORY (INHALATION) at 08:00

## 2022-01-01 RX ADMIN — Medication 3 ML: at 13:17

## 2022-01-01 RX ADMIN — NOREPINEPHRINE BITARTRATE 90.67 MCG/MIN: 1 INJECTION, SOLUTION, CONCENTRATE INTRAVENOUS at 05:36

## 2022-01-01 RX ADMIN — NOREPINEPHRINE BITARTRATE 84 MCG/MIN: 1 INJECTION, SOLUTION, CONCENTRATE INTRAVENOUS at 00:34

## 2022-01-01 RX ADMIN — Medication 1 MG/HR: at 11:43

## 2022-01-01 RX ADMIN — CEFEPIME HYDROCHLORIDE 2000 MG: 2 INJECTION, POWDER, FOR SOLUTION INTRAVENOUS at 09:15

## 2022-01-01 RX ADMIN — CALCIUM GLUCONATE 2 G: 20 INJECTION, SOLUTION INTRAVENOUS at 06:05

## 2022-01-01 RX ADMIN — EPINEPHRINE 20 MCG/MIN: 1 INJECTION INTRAMUSCULAR; INTRAVENOUS; SUBCUTANEOUS at 04:56

## 2022-01-01 RX ADMIN — CALCIUM GLUCONATE 1 G: 20 INJECTION, SOLUTION INTRAVENOUS at 08:23

## 2022-01-01 RX ADMIN — SODIUM PHOSPHATE, MONOBASIC, MONOHYDRATE 6 MMOL: 276; 142 INJECTION, SOLUTION INTRAVENOUS at 00:40

## 2022-01-01 RX ADMIN — NOREPINEPHRINE BITARTRATE 50 MCG/MIN: 1 INJECTION, SOLUTION, CONCENTRATE INTRAVENOUS at 14:42

## 2022-01-01 RX ADMIN — SODIUM PHOSPHATE, MONOBASIC, MONOHYDRATE 6 MMOL: 276; 142 INJECTION, SOLUTION INTRAVENOUS at 23:53

## 2022-01-01 RX ADMIN — AMIODARONE HYDROCHLORIDE 0.5 MG/MIN: 50 INJECTION, SOLUTION INTRAVENOUS at 02:01

## 2022-01-01 RX ADMIN — VASOPRESSIN 0.04 UNITS/MIN: 20 INJECTION INTRAVENOUS at 04:07

## 2022-01-01 RX ADMIN — PANTOPRAZOLE SODIUM 40 MG: 40 TABLET, DELAYED RELEASE ORAL at 06:34

## 2022-01-01 RX ADMIN — SODIUM PHOSPHATE, MONOBASIC, MONOHYDRATE 6 MMOL: 276; 142 INJECTION, SOLUTION INTRAVENOUS at 06:14

## 2022-01-01 RX ADMIN — CHLORHEXIDINE GLUCONATE 0.12% ORAL RINSE 15 ML: 1.2 LIQUID ORAL at 20:26

## 2022-01-01 RX ADMIN — Medication 20000 ML: at 23:55

## 2022-01-01 RX ADMIN — OXYCODONE HYDROCHLORIDE 10 MG: 10 TABLET ORAL at 20:05

## 2022-01-01 RX ADMIN — EPINEPHRINE 12 MCG/MIN: 1 INJECTION INTRAMUSCULAR; INTRAVENOUS; SUBCUTANEOUS at 19:36

## 2022-01-01 RX ADMIN — NOREPINEPHRINE BITARTRATE 100 MCG/MIN: 1 INJECTION, SOLUTION, CONCENTRATE INTRAVENOUS at 20:04

## 2022-01-01 RX ADMIN — SODIUM PHOSPHATE, MONOBASIC, MONOHYDRATE 6 MMOL: 276; 142 INJECTION, SOLUTION INTRAVENOUS at 20:54

## 2022-01-01 RX ADMIN — SODIUM CHLORIDE SOLN NEBU 3% 4 ML: 3 NEBU SOLN at 14:30

## 2022-01-01 RX ADMIN — Medication 50 MCG/HR: at 00:06

## 2022-01-01 RX ADMIN — MIDODRINE HYDROCHLORIDE 10 MG: 5 TABLET ORAL at 13:30

## 2022-01-01 RX ADMIN — VASOPRESSIN 0.04 UNITS/MIN: 20 INJECTION INTRAVENOUS at 23:22

## 2022-01-01 RX ADMIN — METOPROLOL SUCCINATE 25 MG: 25 TABLET, EXTENDED RELEASE ORAL at 08:44

## 2022-01-01 RX ADMIN — DOCUSATE SODIUM AND SENNOSIDES 2 TABLET: 8.6; 5 TABLET, FILM COATED ORAL at 08:50

## 2022-01-01 RX ADMIN — ALUMINA, MAGNESIA, AND SIMETHICONE ORAL SUSPENSION REGULAR STRENGTH 30 ML: 1200; 1200; 120 SUSPENSION ORAL at 07:44

## 2022-01-01 RX ADMIN — CALCIUM GLUCONATE 1 G: 20 INJECTION, SOLUTION INTRAVENOUS at 16:02

## 2022-01-01 RX ADMIN — HEPARIN SODIUM 7500 UNITS: 5000 INJECTION INTRAVENOUS; SUBCUTANEOUS at 05:22

## 2022-01-01 RX ADMIN — CALCIUM GLUCONATE 2 G: 20 INJECTION, SOLUTION INTRAVENOUS at 10:28

## 2022-01-01 RX ADMIN — Medication 1 APPLICATION: at 09:01

## 2022-01-01 RX ADMIN — ALBUMIN (HUMAN) 25 G: 0.25 INJECTION, SOLUTION INTRAVENOUS at 10:14

## 2022-01-01 RX ADMIN — ALBUTEROL SULFATE 2.5 MG: 2.5 SOLUTION RESPIRATORY (INHALATION) at 04:04

## 2022-01-01 RX ADMIN — Medication 20000 ML: at 05:44

## 2022-01-01 RX ADMIN — ALBUTEROL SULFATE 2.5 MG: 2.5 SOLUTION RESPIRATORY (INHALATION) at 19:46

## 2022-01-01 RX ADMIN — PHENYLEPHRINE HYDROCHLORIDE 200 MCG/MIN: 10 INJECTION INTRAVENOUS at 19:24

## 2022-01-01 RX ADMIN — Medication 20000 ML: at 22:20

## 2022-01-01 RX ADMIN — CEFTRIAXONE 1000 MG: 1 INJECTION, SOLUTION INTRAVENOUS at 17:31

## 2022-01-01 RX ADMIN — NOREPINEPHRINE BITARTRATE 84 MCG/MIN: 1 INJECTION, SOLUTION, CONCENTRATE INTRAVENOUS at 02:07

## 2022-01-01 RX ADMIN — AMIODARONE HYDROCHLORIDE 0.5 MG/MIN: 50 INJECTION, SOLUTION INTRAVENOUS at 05:47

## 2022-01-01 RX ADMIN — CHLORHEXIDINE GLUCONATE 0.12% ORAL RINSE 15 ML: 1.2 LIQUID ORAL at 23:25

## 2022-01-01 RX ADMIN — PANTOPRAZOLE SODIUM 40 MG: 40 TABLET, DELAYED RELEASE ORAL at 06:07

## 2022-01-01 RX ADMIN — DEXTROSE 150 MG: 50 INJECTION, SOLUTION INTRAVENOUS at 15:18

## 2022-01-01 RX ADMIN — SODIUM PHOSPHATE, MONOBASIC, MONOHYDRATE 9 MMOL: 276; 142 INJECTION, SOLUTION INTRAVENOUS at 19:06

## 2022-01-01 RX ADMIN — VASOPRESSIN 0.04 UNITS/MIN: 20 INJECTION INTRAVENOUS at 13:30

## 2022-01-01 RX ADMIN — ALBUTEROL SULFATE 2.5 MG: 2.5 SOLUTION RESPIRATORY (INHALATION) at 03:05

## 2022-01-01 RX ADMIN — EPINEPHRINE 18 MCG/MIN: 1 INJECTION INTRAMUSCULAR; INTRAVENOUS; SUBCUTANEOUS at 02:01

## 2022-01-01 RX ADMIN — MIDODRINE HYDROCHLORIDE 10 MG: 5 TABLET ORAL at 17:38

## 2022-01-01 RX ADMIN — CALCIUM GLUCONATE 1 G: 20 INJECTION, SOLUTION INTRAVENOUS at 05:45

## 2022-01-01 RX ADMIN — Medication 20000 ML: at 22:35

## 2022-01-01 RX ADMIN — MUPIROCIN 1 APPLICATION: 20 OINTMENT TOPICAL at 12:57

## 2022-01-01 RX ADMIN — NOREPINEPHRINE BITARTRATE 5 MCG/MIN: 1 INJECTION, SOLUTION, CONCENTRATE INTRAVENOUS at 20:25

## 2022-01-01 RX ADMIN — Medication 20000 ML: at 12:00

## 2022-01-01 RX ADMIN — VANCOMYCIN HYDROCHLORIDE 2000 MG: 1 INJECTION, POWDER, LYOPHILIZED, FOR SOLUTION INTRAVENOUS at 12:36

## 2022-01-01 RX ADMIN — SODIUM PHOSPHATE, MONOBASIC, MONOHYDRATE 9 MMOL: 276; 142 INJECTION, SOLUTION INTRAVENOUS at 02:00

## 2022-01-01 RX ADMIN — WHITE PETROLATUM 57.7 %-MINERAL OIL 31.9 % EYE OINTMENT: at 13:41

## 2022-01-01 RX ADMIN — DIGOXIN 62.5 MCG: 0.25 INJECTION INTRAMUSCULAR; INTRAVENOUS at 09:08

## 2022-01-01 RX ADMIN — POTASSIUM CHLORIDE 40 MEQ: 1500 TABLET, EXTENDED RELEASE ORAL at 18:11

## 2022-01-01 RX ADMIN — OXYCODONE HYDROCHLORIDE 10 MG: 10 TABLET ORAL at 03:32

## 2022-01-01 RX ADMIN — FOLIC ACID 1 MG: 1 TABLET ORAL at 09:39

## 2022-01-01 RX ADMIN — NOREPINEPHRINE BITARTRATE 100 MCG/MIN: 1 INJECTION, SOLUTION, CONCENTRATE INTRAVENOUS at 20:44

## 2022-01-01 RX ADMIN — POTASSIUM PHOSPHATE, MONOBASIC AND POTASSIUM PHOSPHATE, DIBASIC 9 MMOL: 224; 236 INJECTION, SOLUTION, CONCENTRATE INTRAVENOUS at 14:03

## 2022-01-01 RX ADMIN — ALBUMIN (HUMAN) 25 G: 0.25 INJECTION, SOLUTION INTRAVENOUS at 13:19

## 2022-01-01 RX ADMIN — POLYETHYLENE GLYCOL 3350 17 G: 17 POWDER, FOR SOLUTION ORAL at 09:08

## 2022-01-01 RX ADMIN — EPOPROSTENOL 49.8 NG/KG/MIN: 1.5 INJECTION, POWDER, LYOPHILIZED, FOR SOLUTION INTRAVENOUS at 10:15

## 2022-01-01 RX ADMIN — HYDROCORTISONE SODIUM SUCCINATE 100 MG: 100 INJECTION, POWDER, FOR SOLUTION INTRAMUSCULAR; INTRAVENOUS at 14:58

## 2022-01-01 RX ADMIN — WHITE PETROLATUM 57.7 %-MINERAL OIL 31.9 % EYE OINTMENT: at 13:08

## 2022-01-01 RX ADMIN — NOREPINEPHRINE BITARTRATE 80 MCG/MIN: 1 INJECTION, SOLUTION, CONCENTRATE INTRAVENOUS at 00:29

## 2022-01-01 RX ADMIN — NOREPINEPHRINE BITARTRATE 100 MCG/MIN: 1 INJECTION, SOLUTION, CONCENTRATE INTRAVENOUS at 02:01

## 2022-01-01 RX ADMIN — ALBUMIN (HUMAN) 25 G: 0.25 INJECTION, SOLUTION INTRAVENOUS at 04:05

## 2022-01-01 RX ADMIN — EPINEPHRINE 1 MG: 0.1 INJECTION, SOLUTION ENDOTRACHEAL; INTRACARDIAC; INTRAVENOUS at 22:46

## 2022-01-01 RX ADMIN — NOREPINEPHRINE BITARTRATE 100 MCG/MIN: 1 INJECTION, SOLUTION, CONCENTRATE INTRAVENOUS at 19:24

## 2022-01-01 RX ADMIN — OXYCODONE HYDROCHLORIDE 5 MG: 5 TABLET ORAL at 19:14

## 2022-01-01 RX ADMIN — NOREPINEPHRINE BITARTRATE 60 MCG/MIN: 1 INJECTION, SOLUTION, CONCENTRATE INTRAVENOUS at 09:07

## 2022-01-01 RX ADMIN — NOREPINEPHRINE BITARTRATE 100 MCG/MIN: 1 INJECTION, SOLUTION, CONCENTRATE INTRAVENOUS at 08:00

## 2022-01-01 RX ADMIN — Medication 20000 ML: at 02:36

## 2022-01-01 RX ADMIN — PANTOPRAZOLE SODIUM 40 MG: 40 TABLET, DELAYED RELEASE ORAL at 17:30

## 2022-01-01 RX ADMIN — MIDODRINE HYDROCHLORIDE 15 MG: 5 TABLET ORAL at 13:58

## 2022-01-01 RX ADMIN — SODIUM CHLORIDE SOLN NEBU 3% 4 ML: 3 NEBU SOLN at 14:12

## 2022-01-01 RX ADMIN — Medication 1.5 MG/HR: at 20:55

## 2022-01-01 RX ADMIN — HEPARIN SODIUM 7500 UNITS: 5000 INJECTION INTRAVENOUS; SUBCUTANEOUS at 13:30

## 2022-01-01 RX ADMIN — EPINEPHRINE 7 MCG/MIN: 1 INJECTION INTRAMUSCULAR; INTRAVENOUS; SUBCUTANEOUS at 20:23

## 2022-01-01 RX ADMIN — SODIUM PHOSPHATE, MONOBASIC, MONOHYDRATE 9 MMOL: 276; 142 INJECTION, SOLUTION INTRAVENOUS at 15:19

## 2022-01-01 RX ADMIN — CALCIUM GLUCONATE 1 G: 20 INJECTION, SOLUTION INTRAVENOUS at 02:00

## 2022-01-01 RX ADMIN — POLYETHYLENE GLYCOL 3350 17 G: 17 POWDER, FOR SOLUTION ORAL at 20:32

## 2022-01-01 RX ADMIN — MIDODRINE HYDROCHLORIDE 10 MG: 5 TABLET ORAL at 06:27

## 2022-01-01 RX ADMIN — BISACODYL 10 MG: 10 SUPPOSITORY RECTAL at 07:57

## 2022-01-01 RX ADMIN — CHLORHEXIDINE GLUCONATE 0.12% ORAL RINSE 15 ML: 1.2 LIQUID ORAL at 08:03

## 2022-01-01 RX ADMIN — NOREPINEPHRINE BITARTRATE 100 MCG/MIN: 1 INJECTION, SOLUTION, CONCENTRATE INTRAVENOUS at 12:12

## 2022-01-01 RX ADMIN — Medication 3 ML: at 19:25

## 2022-01-01 RX ADMIN — SENNOSIDES AND DOCUSATE SODIUM 2 TABLET: 50; 8.6 TABLET ORAL at 18:12

## 2022-01-01 RX ADMIN — CALCIUM GLUCONATE 1 G: 20 INJECTION, SOLUTION INTRAVENOUS at 12:24

## 2022-01-01 RX ADMIN — HEPARIN SODIUM 7500 UNITS: 5000 INJECTION INTRAVENOUS; SUBCUTANEOUS at 06:41

## 2022-01-01 RX ADMIN — ALBUTEROL SULFATE 2.5 MG: 2.5 SOLUTION RESPIRATORY (INHALATION) at 19:24

## 2022-01-01 RX ADMIN — CHLORHEXIDINE GLUCONATE 0.12% ORAL RINSE 15 ML: 1.2 LIQUID ORAL at 08:34

## 2022-01-01 RX ADMIN — CISATRACURIUM BESYLATE 2 MCG/KG/MIN: 10 INJECTION, SOLUTION INTRAVENOUS at 17:55

## 2022-01-01 RX ADMIN — DOCUSATE SODIUM AND SENNOSIDES 2 TABLET: 8.6; 5 TABLET, FILM COATED ORAL at 09:30

## 2022-01-01 RX ADMIN — ALBUMIN (HUMAN) 25 G: 0.25 INJECTION, SOLUTION INTRAVENOUS at 16:17

## 2022-01-01 RX ADMIN — LINEZOLID 600 MG: 600 INJECTION, SOLUTION INTRAVENOUS at 10:52

## 2022-01-01 RX ADMIN — CALCIUM CARBONATE (ANTACID) CHEW TAB 500 MG 500 MG: 500 CHEW TAB at 21:19

## 2022-01-01 RX ADMIN — NOREPINEPHRINE BITARTRATE 100 MCG/MIN: 1 INJECTION, SOLUTION, CONCENTRATE INTRAVENOUS at 20:25

## 2022-01-01 RX ADMIN — NOREPINEPHRINE BITARTRATE 90 MCG/MIN: 1 INJECTION, SOLUTION, CONCENTRATE INTRAVENOUS at 10:28

## 2022-01-01 RX ADMIN — HYDROCORTISONE SODIUM SUCCINATE 100 MG: 100 INJECTION, POWDER, FOR SOLUTION INTRAMUSCULAR; INTRAVENOUS at 14:15

## 2022-01-01 RX ADMIN — POTASSIUM CHLORIDE 20 MEQ: 14.9 INJECTION, SOLUTION INTRAVENOUS at 14:23

## 2022-01-01 RX ADMIN — HYDROMORPHONE HYDROCHLORIDE 0.5 MG: 1 INJECTION, SOLUTION INTRAMUSCULAR; INTRAVENOUS; SUBCUTANEOUS at 21:08

## 2022-01-01 RX ADMIN — SODIUM CHLORIDE SOLN NEBU 3% 4 ML: 3 NEBU SOLN at 20:23

## 2022-01-01 RX ADMIN — NOREPINEPHRINE BITARTRATE 100 MCG/MIN: 1 INJECTION, SOLUTION, CONCENTRATE INTRAVENOUS at 13:30

## 2022-01-01 RX ADMIN — Medication 20000 ML: at 21:01

## 2022-01-01 RX ADMIN — NOREPINEPHRINE BITARTRATE 80 MCG/MIN: 1 INJECTION, SOLUTION, CONCENTRATE INTRAVENOUS at 23:21

## 2022-01-01 RX ADMIN — SODIUM CHLORIDE 0.4 MCG/KG/HR: 9 INJECTION, SOLUTION INTRAVENOUS at 01:08

## 2022-01-01 RX ADMIN — POTASSIUM CHLORIDE 40 MEQ: 400 INJECTION, SOLUTION INTRAVENOUS at 03:30

## 2022-01-01 RX ADMIN — HEPARIN SODIUM 7500 UNITS: 5000 INJECTION INTRAVENOUS; SUBCUTANEOUS at 21:16

## 2022-01-01 RX ADMIN — MAGNESIUM HYDROXIDE 30 ML: 1200 LIQUID ORAL at 10:17

## 2022-01-01 RX ADMIN — NOREPINEPHRINE BITARTRATE 100 MCG/MIN: 1 INJECTION, SOLUTION, CONCENTRATE INTRAVENOUS at 03:38

## 2022-01-01 RX ADMIN — CEFEPIME HYDROCHLORIDE 2000 MG: 2 INJECTION, POWDER, FOR SOLUTION INTRAVENOUS at 11:31

## 2022-01-01 RX ADMIN — CHLORHEXIDINE GLUCONATE 0.12% ORAL RINSE 15 ML: 1.2 LIQUID ORAL at 21:59

## 2022-01-01 RX ADMIN — OXYCODONE HYDROCHLORIDE 10 MG: 10 TABLET ORAL at 08:53

## 2022-01-01 RX ADMIN — NOREPINEPHRINE BITARTRATE 100 MCG/MIN: 1 INJECTION, SOLUTION, CONCENTRATE INTRAVENOUS at 14:30

## 2022-01-01 RX ADMIN — SODIUM CHLORIDE SOLN NEBU 3% 4 ML: 3 NEBU SOLN at 02:40

## 2022-01-01 RX ADMIN — PANTOPRAZOLE SODIUM 40 MG: 40 TABLET, DELAYED RELEASE ORAL at 06:27

## 2022-01-01 RX ADMIN — CEFEPIME HYDROCHLORIDE 2000 MG: 2 INJECTION, POWDER, FOR SOLUTION INTRAVENOUS at 22:04

## 2022-01-01 RX ADMIN — ANTACID TABLETS 750 MG: 500 TABLET, CHEWABLE ORAL at 20:25

## 2022-01-01 RX ADMIN — Medication 20000 ML: at 21:00

## 2022-01-01 RX ADMIN — WHITE PETROLATUM 57.7 %-MINERAL OIL 31.9 % EYE OINTMENT: at 13:34

## 2022-01-01 RX ADMIN — CEFEPIME HYDROCHLORIDE 2000 MG: 2 INJECTION, POWDER, FOR SOLUTION INTRAVENOUS at 21:05

## 2022-01-01 RX ADMIN — EPOPROSTENOL 49.8 NG/KG/MIN: 1.5 INJECTION, POWDER, LYOPHILIZED, FOR SOLUTION INTRAVENOUS at 16:54

## 2022-01-01 RX ADMIN — WHITE PETROLATUM 57.7 %-MINERAL OIL 31.9 % EYE OINTMENT 1 APPLICATION: at 13:45

## 2022-01-01 RX ADMIN — POTASSIUM CHLORIDE 20 MEQ: 14.9 INJECTION, SOLUTION INTRAVENOUS at 16:31

## 2022-01-01 RX ADMIN — POLYETHYLENE GLYCOL 3350 17 G: 17 POWDER, FOR SOLUTION ORAL at 08:58

## 2022-01-01 RX ADMIN — OXYCODONE HYDROCHLORIDE 10 MG: 10 TABLET ORAL at 13:56

## 2022-01-01 RX ADMIN — Medication 20000 ML: at 12:27

## 2022-01-01 RX ADMIN — DIGOXIN 250 MCG: 0.25 INJECTION INTRAMUSCULAR; INTRAVENOUS at 22:36

## 2022-01-01 RX ADMIN — POLYETHYLENE GLYCOL 3350 17 G: 17 POWDER, FOR SOLUTION ORAL at 08:50

## 2022-01-01 RX ADMIN — HYDROCORTISONE SODIUM SUCCINATE 50 MG: 100 INJECTION, POWDER, FOR SOLUTION INTRAMUSCULAR; INTRAVENOUS at 12:02

## 2022-01-01 RX ADMIN — NOREPINEPHRINE BITARTRATE 100 MCG/MIN: 1 INJECTION, SOLUTION, CONCENTRATE INTRAVENOUS at 23:02

## 2022-01-01 RX ADMIN — NOREPINEPHRINE BITARTRATE 60 MCG/MIN: 1 INJECTION, SOLUTION, CONCENTRATE INTRAVENOUS at 08:31

## 2022-01-01 RX ADMIN — MAGNESIUM SULFATE IN WATER 2 G: 40 INJECTION, SOLUTION INTRAVENOUS at 02:21

## 2022-01-01 RX ADMIN — VASOPRESSIN 0.04 UNITS/MIN: 20 INJECTION INTRAVENOUS at 21:54

## 2022-01-01 RX ADMIN — VASOPRESSIN 0.04 UNITS/MIN: 20 INJECTION INTRAVENOUS at 17:09

## 2022-01-01 RX ADMIN — PANTOPRAZOLE SODIUM 40 MG: 40 INJECTION, POWDER, FOR SOLUTION INTRAVENOUS at 08:18

## 2022-01-01 RX ADMIN — EPINEPHRINE 10 MCG/MIN: 1 INJECTION INTRAMUSCULAR; INTRAVENOUS; SUBCUTANEOUS at 03:54

## 2022-01-01 RX ADMIN — EPINEPHRINE 20 MCG/MIN: 1 INJECTION INTRAMUSCULAR; INTRAVENOUS; SUBCUTANEOUS at 10:20

## 2022-01-01 RX ADMIN — NOREPINEPHRINE BITARTRATE 100 MCG/MIN: 1 INJECTION, SOLUTION, CONCENTRATE INTRAVENOUS at 08:19

## 2022-01-01 RX ADMIN — VANCOMYCIN HYDROCHLORIDE 1750 MG: 5 INJECTION, POWDER, LYOPHILIZED, FOR SOLUTION INTRAVENOUS at 11:43

## 2022-01-01 RX ADMIN — OXYCODONE HYDROCHLORIDE 10 MG: 10 TABLET ORAL at 08:23

## 2022-01-01 RX ADMIN — DOCUSATE SODIUM AND SENNOSIDES 2 TABLET: 8.6; 5 TABLET, FILM COATED ORAL at 22:13

## 2022-01-01 RX ADMIN — LEVALBUTEROL HYDROCHLORIDE 1.25 MG: 1.25 SOLUTION, CONCENTRATE RESPIRATORY (INHALATION) at 14:30

## 2022-01-01 RX ADMIN — HYDROCORTISONE SODIUM SUCCINATE 100 MG: 100 INJECTION, POWDER, FOR SOLUTION INTRAMUSCULAR; INTRAVENOUS at 22:25

## 2022-01-01 RX ADMIN — Medication 20000 ML: at 17:13

## 2022-01-01 RX ADMIN — DOCUSATE SODIUM AND SENNOSIDES 2 TABLET: 8.6; 5 TABLET, FILM COATED ORAL at 17:24

## 2022-01-01 RX ADMIN — CALCIUM GLUCONATE 1 G: 20 INJECTION, SOLUTION INTRAVENOUS at 14:03

## 2022-01-01 RX ADMIN — POLYETHYLENE GLYCOL 3350 17 G: 17 POWDER, FOR SOLUTION ORAL at 08:07

## 2022-01-01 RX ADMIN — CEFEPIME HYDROCHLORIDE 2000 MG: 2 INJECTION, POWDER, FOR SOLUTION INTRAVENOUS at 21:44

## 2022-01-01 RX ADMIN — PROPOFOL 20 MCG/KG/MIN: 10 INJECTION, EMULSION INTRAVENOUS at 07:15

## 2022-01-01 RX ADMIN — VASOPRESSIN 0.04 UNITS/MIN: 20 INJECTION INTRAVENOUS at 07:14

## 2022-01-01 RX ADMIN — HEPARIN SODIUM 7500 UNITS: 5000 INJECTION INTRAVENOUS; SUBCUTANEOUS at 22:04

## 2022-01-01 RX ADMIN — CHLORHEXIDINE GLUCONATE 0.12% ORAL RINSE 15 ML: 1.2 LIQUID ORAL at 21:19

## 2022-01-01 RX ADMIN — HYDROCORTISONE SODIUM SUCCINATE 100 MG: 100 INJECTION, POWDER, FOR SOLUTION INTRAMUSCULAR; INTRAVENOUS at 05:53

## 2022-01-01 RX ADMIN — HYDROMORPHONE HYDROCHLORIDE 1 MG: 1 INJECTION, SOLUTION INTRAMUSCULAR; INTRAVENOUS; SUBCUTANEOUS at 13:13

## 2022-01-01 RX ADMIN — DOCUSATE SODIUM AND SENNOSIDES 2 TABLET: 8.6; 5 TABLET, FILM COATED ORAL at 08:07

## 2022-01-01 RX ADMIN — WHITE PETROLATUM 57.7 %-MINERAL OIL 31.9 % EYE OINTMENT: at 22:00

## 2022-01-01 RX ADMIN — PROPOFOL 20 MCG/KG/MIN: 10 INJECTION, EMULSION INTRAVENOUS at 03:55

## 2022-01-01 RX ADMIN — Medication 1 MG/HR: at 12:48

## 2022-01-01 RX ADMIN — LEVALBUTEROL HYDROCHLORIDE 1.25 MG: 1.25 SOLUTION, CONCENTRATE RESPIRATORY (INHALATION) at 19:25

## 2022-01-01 RX ADMIN — EPOPROSTENOL 50 NG/KG/MIN: 1.5 INJECTION, POWDER, LYOPHILIZED, FOR SOLUTION INTRAVENOUS at 04:04

## 2022-01-01 RX ADMIN — LACTULOSE 30 G: 10 SOLUTION ORAL at 16:07

## 2022-01-01 RX ADMIN — NOREPINEPHRINE BITARTRATE 60 MCG/MIN: 1 INJECTION, SOLUTION, CONCENTRATE INTRAVENOUS at 06:15

## 2022-01-01 RX ADMIN — MIDODRINE HYDROCHLORIDE 10 MG: 5 TABLET ORAL at 12:05

## 2022-01-01 RX ADMIN — WHITE PETROLATUM 57.7 %-MINERAL OIL 31.9 % EYE OINTMENT: at 20:50

## 2022-01-01 RX ADMIN — NOREPINEPHRINE BITARTRATE 90 MCG/MIN: 1 INJECTION, SOLUTION, CONCENTRATE INTRAVENOUS at 08:49

## 2022-01-01 RX ADMIN — PHENYLEPHRINE HYDROCHLORIDE 200 MCG/MIN: 10 INJECTION INTRAVENOUS at 10:29

## 2022-01-01 RX ADMIN — Medication 20000 ML: at 13:20

## 2022-01-01 RX ADMIN — NOREPINEPHRINE BITARTRATE 100 MCG/MIN: 1 INJECTION, SOLUTION, CONCENTRATE INTRAVENOUS at 20:32

## 2022-01-01 RX ADMIN — VASOPRESSIN 0.04 UNITS/MIN: 20 INJECTION INTRAVENOUS at 00:01

## 2022-01-01 RX ADMIN — LACTULOSE 20 G: 10 SOLUTION ORAL at 20:32

## 2022-01-01 RX ADMIN — AZITHROMYCIN MONOHYDRATE 500 MG: 250 TABLET ORAL at 15:13

## 2022-01-01 RX ADMIN — PHYTONADIONE 10 MG: 10 INJECTION, EMULSION INTRAMUSCULAR; INTRAVENOUS; SUBCUTANEOUS at 11:41

## 2022-01-01 RX ADMIN — OXYCODONE HYDROCHLORIDE 10 MG: 10 TABLET ORAL at 17:39

## 2022-01-01 RX ADMIN — CISATRACURIUM BESYLATE 0.2 MCG/KG/MIN: 10 INJECTION, SOLUTION INTRAVENOUS at 08:24

## 2022-01-01 RX ADMIN — ALBUMIN (HUMAN) 25 G: 0.25 INJECTION, SOLUTION INTRAVENOUS at 22:07

## 2022-01-01 RX ADMIN — Medication 1 APPLICATION: at 21:59

## 2022-01-01 RX ADMIN — SODIUM CHLORIDE SOLN NEBU 3% 4 ML: 3 NEBU SOLN at 19:25

## 2022-01-01 RX ADMIN — HYDROCORTISONE SODIUM SUCCINATE 100 MG: 100 INJECTION, POWDER, FOR SOLUTION INTRAMUSCULAR; INTRAVENOUS at 14:21

## 2022-01-01 RX ADMIN — CALCIUM GLUCONATE 1 G: 20 INJECTION, SOLUTION INTRAVENOUS at 13:58

## 2022-01-01 RX ADMIN — EPINEPHRINE 10 MCG/MIN: 1 INJECTION INTRAMUSCULAR; INTRAVENOUS; SUBCUTANEOUS at 14:30

## 2022-01-01 RX ADMIN — SODIUM CHLORIDE SOLN NEBU 3% 4 ML: 3 NEBU SOLN at 19:46

## 2022-01-01 RX ADMIN — DOCUSATE SODIUM AND SENNOSIDES 2 TABLET: 8.6; 5 TABLET, FILM COATED ORAL at 09:08

## 2022-01-01 RX ADMIN — NOREPINEPHRINE BITARTRATE 69.33 MCG/MIN: 1 INJECTION, SOLUTION, CONCENTRATE INTRAVENOUS at 02:27

## 2022-01-01 RX ADMIN — CALCIUM GLUCONATE 1 G: 20 INJECTION, SOLUTION INTRAVENOUS at 22:34

## 2022-01-01 RX ADMIN — Medication 1 APPLICATION: at 08:24

## 2022-01-01 RX ADMIN — PHENYLEPHRINE HYDROCHLORIDE 180 MCG/MIN: 10 INJECTION INTRAVENOUS at 02:47

## 2022-01-01 RX ADMIN — Medication 20000 ML: at 04:31

## 2022-01-01 RX ADMIN — NOREPINEPHRINE BITARTRATE 100 MCG/MIN: 1 INJECTION, SOLUTION, CONCENTRATE INTRAVENOUS at 02:41

## 2022-01-01 RX ADMIN — LACTULOSE 30 G: 10 SOLUTION ORAL at 20:51

## 2022-01-01 RX ADMIN — WHITE PETROLATUM 57.7 %-MINERAL OIL 31.9 % EYE OINTMENT 1 APPLICATION: at 06:31

## 2022-01-01 RX ADMIN — CISATRACURIUM BESYLATE 2 MCG/KG/MIN: 10 INJECTION, SOLUTION INTRAVENOUS at 21:26

## 2022-01-01 RX ADMIN — SODIUM PHOSPHATE, MONOBASIC, MONOHYDRATE 6 MMOL: 276; 142 INJECTION, SOLUTION INTRAVENOUS at 17:12

## 2022-01-01 RX ADMIN — Medication 20000 ML: at 07:02

## 2022-01-01 RX ADMIN — CEFTRIAXONE 1000 MG: 1 INJECTION, SOLUTION INTRAVENOUS at 17:29

## 2022-01-01 RX ADMIN — CALCIUM GLUCONATE 2 G: 20 INJECTION, SOLUTION INTRAVENOUS at 01:39

## 2022-01-01 RX ADMIN — SODIUM CHLORIDE 0.4 MCG/KG/HR: 9 INJECTION, SOLUTION INTRAVENOUS at 18:48

## 2022-01-01 RX ADMIN — HYDROCORTISONE SODIUM SUCCINATE 100 MG: 100 INJECTION, POWDER, FOR SOLUTION INTRAMUSCULAR; INTRAVENOUS at 21:07

## 2022-01-01 RX ADMIN — ALBUMIN (HUMAN) 25 G: 0.25 INJECTION, SOLUTION INTRAVENOUS at 12:02

## 2022-01-01 RX ADMIN — CEFEPIME HYDROCHLORIDE 2000 MG: 2 INJECTION, POWDER, FOR SOLUTION INTRAVENOUS at 00:53

## 2022-01-01 RX ADMIN — CALCIUM GLUCONATE 1 G: 20 INJECTION, SOLUTION INTRAVENOUS at 14:30

## 2022-01-01 RX ADMIN — WHITE PETROLATUM 57.7 %-MINERAL OIL 31.9 % EYE OINTMENT: at 08:11

## 2022-01-01 RX ADMIN — LEVALBUTEROL HYDROCHLORIDE 1.25 MG: 1.25 SOLUTION, CONCENTRATE RESPIRATORY (INHALATION) at 10:16

## 2022-01-01 RX ADMIN — LINEZOLID 600 MG: 600 INJECTION, SOLUTION INTRAVENOUS at 12:11

## 2022-01-01 RX ADMIN — SENNOSIDES AND DOCUSATE SODIUM 2 TABLET: 50; 8.6 TABLET ORAL at 08:42

## 2022-01-01 RX ADMIN — OXYCODONE HYDROCHLORIDE 5 MG: 5 TABLET ORAL at 23:23

## 2022-01-01 RX ADMIN — LEVALBUTEROL HYDROCHLORIDE 1.25 MG: 1.25 SOLUTION, CONCENTRATE RESPIRATORY (INHALATION) at 13:17

## 2022-01-01 RX ADMIN — SODIUM CHLORIDE 0.2 MCG/KG/HR: 9 INJECTION, SOLUTION INTRAVENOUS at 23:53

## 2022-01-01 RX ADMIN — POTASSIUM CHLORIDE 40 MEQ: 1500 TABLET, EXTENDED RELEASE ORAL at 14:23

## 2022-01-01 RX ADMIN — Medication 1 APPLICATION: at 22:21

## 2022-01-01 RX ADMIN — Medication 1 MG/HR: at 20:03

## 2022-01-01 RX ADMIN — VANCOMYCIN HYDROCHLORIDE 1750 MG: 5 INJECTION, POWDER, LYOPHILIZED, FOR SOLUTION INTRAVENOUS at 12:07

## 2022-01-01 RX ADMIN — DOCUSATE SODIUM AND SENNOSIDES 2 TABLET: 8.6; 5 TABLET, FILM COATED ORAL at 20:35

## 2022-01-01 RX ADMIN — DIGOXIN 62.5 MCG: 0.25 INJECTION INTRAMUSCULAR; INTRAVENOUS at 08:48

## 2022-01-01 RX ADMIN — Medication 20000 ML: at 03:05

## 2022-01-01 RX ADMIN — CISATRACURIUM BESYLATE 2 MCG/KG/MIN: 10 INJECTION, SOLUTION INTRAVENOUS at 04:37

## 2022-01-01 RX ADMIN — HEPARIN SODIUM 500 UNITS/HR: 10000 INJECTION, SOLUTION INTRAVENOUS; SUBCUTANEOUS at 05:25

## 2022-01-01 RX ADMIN — HYDROMORPHONE HYDROCHLORIDE 4 MG: 2 INJECTION INTRAMUSCULAR; INTRAVENOUS; SUBCUTANEOUS at 13:27

## 2022-01-01 RX ADMIN — NOREPINEPHRINE BITARTRATE 100 MCG/MIN: 1 INJECTION, SOLUTION, CONCENTRATE INTRAVENOUS at 16:18

## 2022-01-01 RX ADMIN — SODIUM PHOSPHATE, MONOBASIC, MONOHYDRATE 9 MMOL: 276; 142 INJECTION, SOLUTION INTRAVENOUS at 02:22

## 2022-01-01 RX ADMIN — AMIODARONE HYDROCHLORIDE 0.5 MG/MIN: 50 INJECTION, SOLUTION INTRAVENOUS at 21:32

## 2022-01-01 RX ADMIN — POTASSIUM CHLORIDE 20 MEQ: 14.9 INJECTION, SOLUTION INTRAVENOUS at 14:15

## 2022-01-01 RX ADMIN — ALBUMIN (HUMAN) 25 G: 0.25 INJECTION, SOLUTION INTRAVENOUS at 22:36

## 2022-01-01 RX ADMIN — EPINEPHRINE 12 MCG/MIN: 1 INJECTION INTRAMUSCULAR; INTRAVENOUS; SUBCUTANEOUS at 06:09

## 2022-01-01 RX ADMIN — HEPARIN SODIUM 7500 UNITS: 5000 INJECTION INTRAVENOUS; SUBCUTANEOUS at 21:44

## 2022-01-01 RX ADMIN — NOREPINEPHRINE BITARTRATE 100 MCG/MIN: 1 INJECTION, SOLUTION, CONCENTRATE INTRAVENOUS at 11:45

## 2022-01-01 RX ADMIN — HEPARIN SODIUM 7500 UNITS: 5000 INJECTION INTRAVENOUS; SUBCUTANEOUS at 14:18

## 2022-01-01 RX ADMIN — OXYCODONE HYDROCHLORIDE 10 MG: 10 TABLET ORAL at 11:46

## 2022-01-01 RX ADMIN — HEPARIN SODIUM 7500 UNITS: 5000 INJECTION INTRAVENOUS; SUBCUTANEOUS at 05:54

## 2022-01-01 RX ADMIN — WHITE PETROLATUM 57.7 %-MINERAL OIL 31.9 % EYE OINTMENT 1 APPLICATION: at 12:22

## 2022-01-01 RX ADMIN — MAGNESIUM SULFATE HEPTAHYDRATE 1 G: 1 INJECTION, SOLUTION INTRAVENOUS at 22:19

## 2022-01-01 RX ADMIN — Medication 3 ML: at 07:20

## 2022-01-01 RX ADMIN — OXYCODONE HYDROCHLORIDE 10 MG: 10 TABLET ORAL at 17:35

## 2022-01-01 RX ADMIN — SODIUM CHLORIDE SOLN NEBU 3% 4 ML: 3 NEBU SOLN at 13:05

## 2022-01-01 RX ADMIN — ONDANSETRON 4 MG: 2 INJECTION INTRAMUSCULAR; INTRAVENOUS at 16:13

## 2022-01-01 RX ADMIN — SODIUM CHLORIDE SOLN NEBU 3% 4 ML: 3 NEBU SOLN at 23:12

## 2022-01-01 RX ADMIN — DOCUSATE SODIUM AND SENNOSIDES 2 TABLET: 8.6; 5 TABLET, FILM COATED ORAL at 08:54

## 2022-01-01 RX ADMIN — Medication 20000 ML: at 10:22

## 2022-01-01 RX ADMIN — LEVALBUTEROL HYDROCHLORIDE 1.25 MG: 1.25 SOLUTION, CONCENTRATE RESPIRATORY (INHALATION) at 19:06

## 2022-01-01 RX ADMIN — CEFEPIME HYDROCHLORIDE 2000 MG: 2 INJECTION, POWDER, FOR SOLUTION INTRAVENOUS at 11:25

## 2022-01-01 RX ADMIN — VASOPRESSIN 0.04 UNITS/MIN: 20 INJECTION INTRAVENOUS at 15:35

## 2022-01-01 RX ADMIN — NOREPINEPHRINE BITARTRATE 60 MCG/MIN: 1 INJECTION, SOLUTION, CONCENTRATE INTRAVENOUS at 11:47

## 2022-01-01 RX ADMIN — LACTULOSE 30 G: 10 SOLUTION ORAL at 15:45

## 2022-01-01 RX ADMIN — NOREPINEPHRINE BITARTRATE 80 MCG/MIN: 1 INJECTION, SOLUTION, CONCENTRATE INTRAVENOUS at 22:28

## 2022-01-01 RX ADMIN — CALCIUM GLUCONATE 1 G: 20 INJECTION, SOLUTION INTRAVENOUS at 20:54

## 2022-01-01 RX ADMIN — NOREPINEPHRINE BITARTRATE 100 MCG/MIN: 1 INJECTION, SOLUTION, CONCENTRATE INTRAVENOUS at 07:53

## 2022-01-01 RX ADMIN — MIDODRINE HYDROCHLORIDE 10 MG: 5 TABLET ORAL at 11:41

## 2022-01-01 RX ADMIN — NOREPINEPHRINE BITARTRATE 100 MCG/MIN: 1 INJECTION, SOLUTION, CONCENTRATE INTRAVENOUS at 14:50

## 2022-01-01 RX ADMIN — LINEZOLID 600 MG: 600 INJECTION, SOLUTION INTRAVENOUS at 23:23

## 2022-01-01 RX ADMIN — LACTULOSE 30 G: 10 SOLUTION ORAL at 08:16

## 2022-01-01 RX ADMIN — BISACODYL 10 MG: 10 SUPPOSITORY RECTAL at 08:58

## 2022-01-01 RX ADMIN — ALBUTEROL SULFATE 10 MG: 2.5 SOLUTION RESPIRATORY (INHALATION) at 02:15

## 2022-01-01 RX ADMIN — POLYETHYLENE GLYCOL 3350 17 G: 17 POWDER, FOR SOLUTION ORAL at 10:17

## 2022-01-01 RX ADMIN — Medication 20000 ML: at 18:24

## 2022-01-01 RX ADMIN — ALBUMIN (HUMAN) 25 G: 0.25 INJECTION, SOLUTION INTRAVENOUS at 03:06

## 2022-01-01 RX ADMIN — EPOPROSTENOL 25.23 NG/KG/MIN: 1.5 INJECTION, POWDER, LYOPHILIZED, FOR SOLUTION INTRAVENOUS at 14:45

## 2022-01-01 RX ADMIN — NOREPINEPHRINE BITARTRATE 80 MCG/MIN: 1 INJECTION, SOLUTION, CONCENTRATE INTRAVENOUS at 09:54

## 2022-01-01 RX ADMIN — LACTULOSE 20 G: 10 SOLUTION ORAL at 11:05

## 2022-01-01 RX ADMIN — LACTULOSE 20 G: 10 SOLUTION ORAL at 16:03

## 2022-01-01 RX ADMIN — HYDROMORPHONE HYDROCHLORIDE 1 MG: 1 INJECTION, SOLUTION INTRAMUSCULAR; INTRAVENOUS; SUBCUTANEOUS at 02:23

## 2022-01-01 RX ADMIN — EPINEPHRINE 20 MCG/MIN: 1 INJECTION INTRAMUSCULAR; INTRAVENOUS; SUBCUTANEOUS at 20:44

## 2022-01-01 RX ADMIN — VANCOMYCIN HYDROCHLORIDE 1750 MG: 5 INJECTION, POWDER, LYOPHILIZED, FOR SOLUTION INTRAVENOUS at 13:48

## 2022-01-01 RX ADMIN — ALBUMIN (HUMAN) 25 G: 0.25 INJECTION, SOLUTION INTRAVENOUS at 17:02

## 2022-01-01 RX ADMIN — SODIUM CHLORIDE SOLN NEBU 3% 4 ML: 3 NEBU SOLN at 02:59

## 2022-01-01 RX ADMIN — HYDROMORPHONE HYDROCHLORIDE 1 MG: 1 INJECTION, SOLUTION INTRAMUSCULAR; INTRAVENOUS; SUBCUTANEOUS at 19:26

## 2022-01-01 RX ADMIN — DOCUSATE SODIUM AND SENNOSIDES 2 TABLET: 8.6; 5 TABLET, FILM COATED ORAL at 21:11

## 2022-01-01 RX ADMIN — HYDROCORTISONE SODIUM SUCCINATE 100 MG: 100 INJECTION, POWDER, FOR SOLUTION INTRAMUSCULAR; INTRAVENOUS at 06:23

## 2022-01-01 RX ADMIN — ALBUTEROL SULFATE 2.5 MG: 2.5 SOLUTION RESPIRATORY (INHALATION) at 07:24

## 2022-01-01 RX ADMIN — LEVALBUTEROL HYDROCHLORIDE 1.25 MG: 1.25 SOLUTION, CONCENTRATE RESPIRATORY (INHALATION) at 07:10

## 2022-01-01 RX ADMIN — CEFEPIME HYDROCHLORIDE 2000 MG: 2 INJECTION, POWDER, FOR SOLUTION INTRAVENOUS at 22:25

## 2022-01-01 RX ADMIN — SODIUM PHOSPHATE, MONOBASIC, MONOHYDRATE 6 MMOL: 276; 142 INJECTION, SOLUTION INTRAVENOUS at 06:09

## 2022-01-01 RX ADMIN — VASOPRESSIN 0.04 UNITS/MIN: 20 INJECTION INTRAVENOUS at 08:26

## 2022-01-01 RX ADMIN — DOCUSATE SODIUM AND SENNOSIDES 2 TABLET: 8.6; 5 TABLET, FILM COATED ORAL at 08:23

## 2022-01-01 RX ADMIN — WHITE PETROLATUM 57.7 %-MINERAL OIL 31.9 % EYE OINTMENT 1 APPLICATION: at 04:03

## 2022-01-01 RX ADMIN — SENNOSIDES AND DOCUSATE SODIUM 2 TABLET: 50; 8.6 TABLET ORAL at 08:44

## 2022-01-01 RX ADMIN — MAGNESIUM SULFATE HEPTAHYDRATE 1 G: 1 INJECTION, SOLUTION INTRAVENOUS at 19:06

## 2022-01-01 RX ADMIN — HEPARIN SODIUM 7500 UNITS: 5000 INJECTION INTRAVENOUS; SUBCUTANEOUS at 15:07

## 2022-01-14 NOTE — TELEPHONE ENCOUNTER
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

## 2022-02-01 NOTE — TELEPHONE ENCOUNTER
Gayatri Bautista from [address] [place] [address] called  Patient had a severe nosebleed on Sunday, 1/30/22  Of note, he did take a full dose of warfarin and aspirin 1000 mg (due to aches) on Saturday, 1/29/22  By the time the ambulance came, the nosebleed stopped  He also has an open sore on his great toe and c/o swollen left hand  His weight is up to 358 lbs; therefore patient scheduled for follow up appointment with Dr Fernando Edward on 2/2/22

## 2022-02-03 NOTE — PATIENT INSTRUCTIONS
Spoke with Patient: No changes in medication health or diet  No missed or extra doses  No s/s of bleeding TIA or CVA  No new ABX, NSAIDs OTC meds, or supplements  Dose as instructed and recheck in two weeks     Diana Nash PA-C

## 2022-02-08 PROBLEM — R93.89 ABNORMAL CT OF THE CHEST: Status: ACTIVE | Noted: 2022-01-01

## 2022-02-08 PROBLEM — J18.9 RLL PNEUMONIA: Status: ACTIVE | Noted: 2022-01-01

## 2022-02-08 NOTE — H&P
H&P Exam - Marta Goldstein 57 y o  male MRN: [id number]    Unit/Bed#: 403-01 Encounter: [id number]    Acute on chronic diastolic (congestive) heart failure [facility])  Assessment & Plan  Wt Readings from Last 3 Encounters:   02/08/22 (!) 172 kg (380 lb)   12/29/21 (!) 152 kg (336 lb)   12/14/21 (!) 152 kg (335 lb 1 6 oz)     Prior discharge weight is closer to 340lbs   Chronic diuretic regimen is lasix 120mg BID with PRN metalozone  Echo 2/21 EF 60%, PA pressure 52 mm Hg, no significant valvulopathy  Received 40mg IV lasix, will transition to lasix GTT 10mg/hr  Consult cardiology  Low salt diet with 1800cc fluid restriction    RLL pneumonia  Assessment & Plan  Rocephin / Azithromycin  Check urine Legionella pneumococcal antigen  Check sputum culture  Check procalcitonin    Abdominal wall hematoma  Assessment & Plan  Slightly increased since December  However given drop in hemoglobin and recent trauma will hold warfarin / Check H&H q8 / Surgical consult     Cirrhosis of liver with ascites ([facility] [address] )  Assessment & Plan  Due a history of alcohol use  Discussed with IR small amount of ascites behind colon, unable to complete Para  GI consult     Morbid (severe) obesity due to excess calories (HCC)  Assessment & Plan  TLC    CKD (chronic kidney disease)  Assessment & Plan  Lab Results   Component Value Date    EGFR 36 02/08/2022    EGFR 26 12/13/2021    EGFR 28 12/13/2021    CREATININE 1 91 (H) 02/08/2022    CREATININE 2 51 (H) 12/13/2021    CREATININE 2 42 (H) 12/13/2021     Likely stage 3B  Renal function has fluctuated over the past 3-4 months, it appears his baseline creatinine is around 2  Monitor renal function closely with diuresis and replete electrolytes    Hyponatremia  Assessment & Plan  Likely hypervolemic hyponatremia  Monitor Na level in setting of diuresis     Permanent atrial fibrillation (HCC)  Assessment & Plan  Rate controlled on toprol XL   Hold coumadin due to anemia and abdominal wall hematoma     Chronic anemia  Assessment & Plan  Previous baseline hemoglobin is 7-8  Check H&H q8  Transfuse for hgb < 7      History of Present Illness     Patient is a pleasant 70-year-old male past medical history significant for cirrhosis and diastolic heart failure who presents to the emergency room today at the request of his visiting nurse after sustaining a mechanical fall and noted to have edema  The patient states that he was tying up his dog in the kitchen, tripped over the leash and landed on his right side of his abdomen  There is no loss of consciousness  When his visiting nurse noted lower extremity and abdominal edema, she directed him to the emergency room for evaluation  On further questioning the patient states he has been adjusting his diuretic regimen with his cardiologist due to increasing weight gain and edema  He denies any chest pain, nausea vomiting or syncope  He also complains of a cough, that is productive of sputum that started last week  Denies any fevers or chills but complains of fatigue  Review of Systems   Constitutional: Positive for fatigue  Respiratory: Positive for cough  Negative for shortness of breath  Cardiovascular: Positive for leg swelling  Gastrointestinal: Positive for abdominal distention  Neurological: Positive for weakness  Negative for syncope  All other systems reviewed and are negative        Historical Information   Past Medical History:   Diagnosis Date    A-fib [facility])     Acute renal failure superimposed on stage 3b chronic kidney disease ([facility] ) 03/15/2021    Cardiac disease     Chronic combined systolic (congestive) and diastolic (congestive) heart failure (HCC)     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 ascites (HCC)     Class 3 severe obesity due to excess calories with serious comorbidity and body mass index (BMI) of 45 0 to 49 9 in adult [facility]) 10/17/2018    Diabetes mellitus ([facility] )     Dilated cardiomyopathy ([facility] )     History of echocardiogram 11/24/2014 EF 0 30 (30%), Likely mod LV systolic dysfunction  Likely RV dysfunction as well   History of Lexiscan MPI 02/19/2016    EF 0 43 (43%), no prior MI or ischemia   Hx of echocardiogram 04/28/2017    Normal EF, Normal LV systolic function  Mild concentric LV hypertrophy  Mild mitral and tricuspid regurg   Hx: recurrent pneumonia     Hypertension     Hypokalemia 09/04/2020    Long term (current) use of anticoagulants     Morbid (severe) obesity due to excess calories (HCC)     Neuropathy     Pleural effusion on right     Stage 3 chronic kidney disease ([address] )      Past Surgical History:   Procedure Laterality Date    HERNIA REPAIR      IR CHEST TUBE CHECK/CHANGE/REPOSITION/UPSIZE  5/27/2021    IR CHEST TUBE PLACEMENT  5/22/2021    IR PELVIC ANGIOGRAM  12/4/2021    IR THORACENTESIS  8/25/2020    IR THORACENTESIS  2/23/2021    LUNG DECORTICATION Right 6/2/2021    Procedure: DECORTICATION LUNG;  Surgeon: Randi Acevedo MD;  [facility];  Service: Thoracic    SPLENECTOMY, TOTAL      THORACOSCOPY VIDEO ASSISTED SURGERY (VATS) Right 6/2/2021    Procedure: THORACOSCOPY VIDEO ASSISTED SURGERY (VATS);   Surgeon: Randi Acevedo MD;  [facility];  Service: Thoracic    VASCULAR SURGERY Right     leg     Social History   Social History     Substance and Sexual Activity   Alcohol Use Yes    Comment: 4 or 5 drinks weekly/bloody chani's     Social History     Substance and Sexual Activity   Drug Use Never     Social History     Tobacco Use   Smoking Status Current Some Day Smoker    Packs/day: 0 25    Types: Cigarettes   Smokeless Tobacco Never Used     E-Cigarette Use: Never User     E-Cigarette/Vaping Substances    Nicotine No     THC No     CBD No     Flavoring No     Other No     Unknown No        Family History: non-contributory    Meds/Allergies   all medications and allergies reviewed  No Known Allergies    Objective   First Vitals:   Blood Pressure: 119/67 (02/08/22 1249)  Pulse: 102 (02/08/22 1249)  Temperature: 98 6 °F (37 °C) (02/08/22 1249)  Temp Source: Tympanic (02/08/22 1249)  Respirations: 18 (02/08/22 1249)  Height: 6' (182 9 cm) (02/08/22 1249)  Weight - Scale: (!) 172 kg (380 lb) (02/08/22 1249)  SpO2: 98 % (02/08/22 1249)    Current Vitals:   Blood Pressure: 107/66 (02/08/22 1538)  Pulse: 95 (02/08/22 1538)  Temperature: 98 6 °F (37 °C) (02/08/22 1249)  Temp Source: Tympanic (02/08/22 1249)  Respirations: 18 (02/08/22 1538)  Height: 6' (182 9 cm) (02/08/22 1249)  Weight - Scale: (!) 172 kg (380 lb) (02/08/22 1249)  SpO2: 99 % (02/08/22 1538)    No intake or output data in the 24 hours ending 02/08/22 1546    Invasive Devices  Report    Peripheral Intravenous Line            Peripheral IV 02/08/22 Right Antecubital <1 day                Physical Exam    Lab Results:   Lab Results   Component Value Date     12/16/2015    SODIUM 128 (L) 02/08/2022    K 2 5 (LL) 02/08/2022    CL 89 (L) 02/08/2022    CO2 37 (H) 02/08/2022    ANIONGAP 11 12/16/2015    AGAP 2 (L) 02/08/2022    BUN 82 (H) 02/08/2022    CREATININE 1 91 (H) 02/08/2022    GLUC 128 02/08/2022    GLUF 127 (H) 10/29/2021    CALCIUM 8 1 (L) 02/08/2022    AST 14 12/08/2021    ALT 11 (L) 12/08/2021    ALKPHOS 187 (H) 12/08/2021    PROT 7 6 12/16/2015    TP 6 6 12/08/2021    BILITOT 1 03 (H) 12/16/2015    TBILI 0 93 12/08/2021    EGFR 36 02/08/2022     Lab Results   Component Value Date    WBC 5 58 02/08/2022    HGB 7 1 (L) 02/08/2022    HCT 23 1 (L) 02/08/2022    MCV 90 02/08/2022     02/08/2022       wetread  Imaging:   TRAUMA - CT chest abdomen pelvis w contrast   Final Result      1  Left abdominal wall hematoma is mildly increased from December 2021   2  Mediastinal/left neck base lymphadenopathy, consider follow-up neck and chest CT in 3 months to evaluate for persistence   3  Right lower lobe airspace opacities, concerning for pneumonia   4  Anasarca   5  Small right pleural effusion   6  Cirrhotic morphology with ascites   7  Additional findings as above            Workstation performed: [id number]         TRAUMA - CT spine cervical wo contrast   Final Result      1  No cervical spine fracture or traumatic malalignment  2   Cervical lymphadenopathy                 Workstation performed: [id number]         TRAUMA - CT head wo contrast   Final Result      No acute intracranial abnormality  Workstation performed: [id number]         XR Trauma chest portable   ED Interpretation   One-view x-ray of the chest interpreted by myself pending official Radiology report  I see right-sided pleural effusion no traumatic pneumothorax or large infiltrate seen  Cardiomegaly chronic        IR INPATIENT Paracentesis    (Results Pending)       EKG, Pathology, and Other Studies: afib     Code Status: Level 1 - Full Code  Advance Directive and Living Will:      Power of :    POLST:      Counseling / Coordination of Care:

## 2022-02-08 NOTE — ASSESSMENT & PLAN NOTE
Wt Readings from Last 3 Encounters:   02/08/22 (!) 172 kg (380 lb)   12/29/21 (!) 152 kg (336 lb)   12/14/21 (!) 152 kg (335 lb 1 6 oz)     Prior discharge weight is closer to 340lbs, approximately 30-40 lbs up from dry weight  Chronic diuretic regimen is lasix 120mg BID with PRN metalozone  Echo 2/21 EF 60%, PA pressure 52 mm Hg, no significant valvulopathy  Received 40mg IV lasix on admission then transitioned to lasix GTT 10mg/hr on 2/8  Cardiology input appreciated   Low salt diet with 1800cc fluid restriction

## 2022-02-08 NOTE — CONSULTS
Consultation - General Surgery   Memo Olivares 57 y o  male MRN: [id number]  Unit/Bed#: 403-01 Encounter: [id number]    Assessment/Plan     Assessment:  58-year-old gentleman with numerous comorbidities admitted status post recurrent fall and also noted to have pneumonia  Noted to have a large rectus sheath hematoma slightly enlarged compared to previous CT scan  There was a drop in hemoglobin  General surgery consult for evaluation  Plan:  - serial hemoglobin, transfuse as necessary  - abdominal binder  - pain control  - remainder of comorbidities as per primary medical team    Patient with recent admission in December for fall and abdominal wall/rectus sheath hematoma  That point time no intervention was done  It is possible that the CT scan today which shows enlargement could just be interval enlargement after the initial fall and does not really reflect any acute enlargement  For now continue to trend his hemoglobin  If continues to fall he becomes hemodynamically unstable he may require repeat CTA abdomen pelvis to determine if actively  History of Present Illness     HPI:  Memo Olivares is a 57 y o  male no numerous comorbidities including cirrhosis, morbid obesity, CKD, AFib, anemia, initially presented to the ER status post fall  Patient was tryin to type his dog in the kitchen subsequent tripped and fell on his right side the abdomen  Does have a previous admission in December for fall where he was noted to have a rectus sheath hematoma  There is no loss of consciousness  However the visiting nurse noted lower extremity edema and significant abdominal edema and therefore she had him come to the ER  Patient states he has noted significant edema and does follow closely with cardiology regarding his diuretic management  He does admit to just overall not feeling well  Is that he also admits to significant abdominal pain more so on the right side    Denies any nausea vomiting no fevers or chills  No worsening chest pain or shortness of breath  He does complain of overall fatigue and also does complain of a productive cough  In the ER he underwent a CT the abdomen pelvis which did show a slightly enlarged hematoma of the abdominal wall/rectus sheath when compared to his previous admission on December  Inpatient consult to Acute Care Surgery  Consult performed by: Sena Washington DO  Consult ordered by: Heriberto Sanderson MD          Review of Systems     Review systems completed, all negative except as noted above HPI  Historical Information   Past Medical History:   Diagnosis Date    A-fib [facility])     Acute renal failure superimposed on stage 3b chronic kidney disease ([facility] ) 03/15/2021    Cardiac disease     Chronic combined systolic (congestive) and diastolic (congestive) heart failure (HCC)     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 ascites (HCC)     Class 3 severe obesity due to excess calories with serious comorbidity and body mass index (BMI) of 45 0 to 49 9 in adult [facility]) 10/17/2018    Diabetes mellitus ([place] [address] )     Dilated cardiomyopathy ([place] [address] )     History of echocardiogram 11/24/2014    EF 0 30 (30%), Likely mod LV systolic dysfunction  Likely RV dysfunction as well   History of Lexiscan MPI 02/19/2016    EF 0 43 (43%), no prior MI or ischemia   Hx of echocardiogram 04/28/2017    Normal EF, Normal LV systolic function  Mild concentric LV hypertrophy  Mild mitral and tricuspid regurg      Hx: recurrent pneumonia     Hypertension     Hypokalemia 09/04/2020    Long term (current) use of anticoagulants     Morbid (severe) obesity due to excess calories (HCC)     Neuropathy     Pleural effusion on right     Stage 3 chronic kidney disease [facility])      Past Surgical History:   Procedure Laterality Date    HERNIA REPAIR      IR CHEST TUBE CHECK/CHANGE/REPOSITION/UPSIZE  5/27/2021    IR CHEST TUBE PLACEMENT  5/22/2021    IR PELVIC ANGIOGRAM  12/4/2021    IR THORACENTESIS  8/25/2020    IR THORACENTESIS  2/23/2021    LUNG DECORTICATION Right 6/2/2021    Procedure: DECORTICATION LUNG;  Surgeon: Erick Cantu MD;  [facility];  Service: Thoracic    SPLENECTOMY, TOTAL      THORACOSCOPY VIDEO ASSISTED SURGERY (VATS) Right 6/2/2021    Procedure: THORACOSCOPY VIDEO ASSISTED SURGERY (VATS); Surgeon: Erick Cantu MD;  [facility];  Service: Thoracic    VASCULAR SURGERY Right     leg     Social History   Social History     Substance and Sexual Activity   Alcohol Use Yes    Comment: 4 or 5 drinks weekly/bloody chani's     Social History     Substance and Sexual Activity   Drug Use Never     Social History     Tobacco Use   Smoking Status Current Some Day Smoker    Packs/day: 0 25    Types: Cigarettes   Smokeless Tobacco Never Used     Family History: non-contributory    Meds/Allergies   all current active meds have been reviewed  No Known Allergies    Objective   First Vitals:   Blood Pressure: 119/67 (02/08/22 1249)  Pulse: 102 (02/08/22 1249)  Temperature: 98 6 °F (37 °C) (02/08/22 1249)  Temp Source: Tympanic (02/08/22 1249)  Respirations: 18 (02/08/22 1249)  Height: 6' (182 9 cm) (02/08/22 1249)  Weight - Scale: (!) 172 kg (380 lb) (02/08/22 1249)  SpO2: 98 % (02/08/22 1249)    Current Vitals:   Blood Pressure: 107/66 (02/08/22 1538)  Pulse: 95 (02/08/22 1538)  Temperature: 98 6 °F (37 °C) (02/08/22 1249)  Temp Source: Tympanic (02/08/22 1249)  Respirations: 18 (02/08/22 1538)  Height: 6' (182 9 cm) (02/08/22 1249)  Weight - Scale: (!) 172 kg (380 lb) (02/08/22 1249)  SpO2: 99 % (02/08/22 1538)    No intake or output data in the 24 hours ending 02/08/22 1800    Invasive Devices  Report    Peripheral Intravenous Line            Peripheral IV 02/08/22 Right Antecubital <1 day                Physical Exam  Vitals reviewed  Constitutional:       General: He is not in acute distress  Appearance: He is obese  He is ill-appearing   He is not toxic-appearing or diaphoretic  HENT:      Head: Normocephalic and atraumatic  Right Ear: External ear normal       Left Ear: External ear normal    Eyes:      General: No scleral icterus  Right eye: No discharge  Left eye: No discharge  Cardiovascular:      Rate and Rhythm: Normal rate  Heart sounds: Normal heart sounds  No friction rub  No gallop  Pulmonary:      Effort: Pulmonary effort is normal  No respiratory distress  Breath sounds: No wheezing or rhonchi  Comments: Decreased breath sounds at the bases  Abdominal:      General: There is distension  Palpations: Abdomen is soft  There is no mass  Tenderness: There is abdominal tenderness (Right hemiabdomen)  There is no guarding or rebound  Hernia: No hernia is present  Comments: Diffuse abdominal tenderness greatest in the right hemiabdomen consistent with known hematoma  Overall significant edema and signs of anasarca   Musculoskeletal:         General: Swelling present  Cervical back: Normal range of motion  Right lower leg: Edema present  Left lower leg: Edema present  Skin:     General: Skin is warm  Coloration: Skin is not jaundiced or pale  Neurological:      General: No focal deficit present  Mental Status: He is alert and oriented to person, place, and time  Cranial Nerves: No cranial nerve deficit  Psychiatric:         Mood and Affect: Mood normal          Behavior: Behavior normal          Thought Content: Thought content normal          Judgment: Judgment normal          Lab Results:   I have personally reviewed pertinent lab results    , CBC:   Lab Results   Component Value Date    WBC 5 50 02/09/2022    HGB 6 6 (LL) 02/09/2022    HCT 21 4 (L) 02/09/2022    MCV 90 02/09/2022     02/09/2022    MCH 27 7 02/09/2022    MCHC 30 8 (L) 02/09/2022    RDW 17 7 (H) 02/09/2022    MPV 10 8 02/09/2022    NRBC 0 02/09/2022   , CMP:   Lab Results   Component Value Date    SODIUM 129 (L) 02/09/2022    K 3 2 (L) 02/09/2022    CL 90 (L) 02/09/2022    CO2 35 (H) 02/09/2022    BUN 80 (H) 02/09/2022    CREATININE 1 87 (H) 02/09/2022    CALCIUM 8 0 (L) 02/09/2022    AST 22 02/09/2022    ALT 11 (L) 02/09/2022    ALKPHOS 143 (H) 02/09/2022    EGFR 37 02/09/2022   , Coagulation:   Lab Results   Component Value Date    INR 1 63 (H) 02/08/2022   , Urinalysis:   Lab Results   Component Value Date    COLORU Yellow 02/08/2022    CLARITYU Clear 02/08/2022    SPECGRAV 1 010 02/08/2022    PHUR 7 0 02/08/2022    LEUKOCYTESUR Negative 02/08/2022    NITRITE Negative 02/08/2022    GLUCOSEU Negative 02/08/2022    KETONESU Negative 02/08/2022    BILIRUBINUR Negative 02/08/2022    BLOODU Trace-lysed (A) 02/08/2022   , Amylase: No results found for: AMYLASE, Lipase: No results found for: LIPASE  Imaging: I have personally reviewed pertinent films in PACS  EKG, Pathology, and Other Studies: I have personally reviewed pertinent reports

## 2022-02-08 NOTE — ASSESSMENT & PLAN NOTE
CT chest showing concern for RLL pneumonia  procalcitonin x 2 positive at 0 5 however could be elevated due to CKD  Urinary antigens negative, discontinue azithromycin  Has been on ceftriaxone 2 days, continue for now and continue to trend  WBC normal, afebrile  Check sputum culture

## 2022-02-08 NOTE — ED PROVIDER NOTES
Emergency Department Trauma Note  Janie Remy 57 y o  male MRN: [id number]  Unit/Bed#: KV91/IQ89 Encounter: [id number]      Trauma Alert: Trauma Acuity: Trauma Evaluation  Model of Arrival: Mode of Arrival: BLS via    Trauma Team: Current Providers  Attending Provider: Maureen Arthur DO  Attending Provider: Dilcia Pierre MD  Registered Nurse: Kristi Espinoza RN  Unit Clerk: Félix Cadet  Consultants:     None      History of Present Illness     Chief Complaint:   Chief Complaint   Patient presents with    Edema     patient reports generalized body edema for months    Trauma    Fall     HPI:  Janie Remy is a 57 y o  male who presents with patient presents via EMS from home for evaluation a generalized edema, generalized weakness and frequent falls  Patient initially denies any acute complaints  He was said he was referred here by the visiting nurse  On further questioning he reports that he fell 1 hour prior to arrival   He had attached the dog's leash to the kitchen table and tripped over it landing on a futon  He was unable to get himself up under his own efforts  The visiting nurse was unable to his system as well  They called EMS to assist with his fall  The patient notes abrasions to his left forearm denies any acute pain deformity swelling or any other acute injury  Denies loss conscious or head trauma  He reports he takes Coumadin  He reports he did fall previous to this at least 1 week prior and had some bruising on his right side he was not evaluated for this  He denies hematemesis hemoptysis syncope shortness of breath or chest pain  He denies head trauma  He reports he could not get himself up due to generalized weakness which she reports is worsened by his generalized edema  He does report compliance with his Lasix and his metolazone      Mechanism:Details of Incident: patient reports falling onto [employer] Injury Date: 02/08/22 Injury Time: 1200 Injury Occurence Location - [address] [place] [address] Way: [address]      Fall  Mechanism of injury: fall    Injury location:  Shoulder/arm  Shoulder/arm injury location:  L forearm  Incident location:  Home  Time since incident:  1 hour  Arrived directly from scene: yes    Fall:     Fall occurred:  Standing    Impact surface:  [employer] of impact:  Outstretched arms  Protective equipment: none    Suspicion of alcohol use: no    Suspicion of drug use: no    Tetanus status:  Unknown  Prior to arrival data:     Bystander interventions:  None    Patient ambulatory at scene: no      Blood loss:  None    Responsiveness at scene:  Alert    Orientation at scene:  Person, place and situation    Loss of consciousness: no      Amnesic to event: no      Airway interventions:  None    Breathing interventions:  None    IV access status:  None    IO access:  None    Fluids administered:  None    Cardiac interventions:  None    Medications administered:  None    Immobilization:  None    Airway condition since incident:  Stable    Breathing condition since incident:  Stable    Circulation condition since incident:  Stable    Mental status condition since incident:  Stable    Disability condition since incident:  Stable  Associated symptoms: no abdominal pain, no back pain, no chest pain, no difficulty breathing, no headaches, no hearing loss, no loss of consciousness, no nausea, no neck pain, no seizures and no vomiting    Risk factors: anticoagulation therapy, CAD and CHF      Review of Systems   Constitutional: Negative for chills and fever  HENT: Negative for ear pain, hearing loss and sore throat  Eyes: Negative for pain and visual disturbance  Respiratory: Negative for cough and shortness of breath  Cardiovascular: Positive for leg swelling  Negative for chest pain and palpitations  Gastrointestinal: Negative for abdominal pain, nausea and vomiting  Genitourinary: Negative for dysuria and hematuria     Musculoskeletal: Negative for arthralgias, back pain and neck pain  Skin: Negative for color change and rash  Neurological: Positive for weakness  Negative for seizures, loss of consciousness, syncope and headaches  All other systems reviewed and are negative  Historical Information     Immunizations:   Immunization History   Administered Date(s) Administered    COVID-19 MODERNA VACC 0 5 ML IM 03/19/2021, 04/16/2021    Influenza, injectable, quadrivalent, preservative free 0 5 mL 10/13/2020    Pneumococcal Polysaccharide PPV23 11/28/2014    Tdap 11/28/2019       Past Medical History:   Diagnosis Date    A-fib [facility])     Acute renal failure superimposed on stage 3b chronic kidney disease ([facility] ) 03/15/2021    Cardiac disease     Chronic combined systolic (congestive) and diastolic (congestive) heart failure (HCC)     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 ascites (HCC)     Class 3 severe obesity due to excess calories with serious comorbidity and body mass index (BMI) of 45 0 to 49 9 in adult ([facility] ) 10/17/2018    Diabetes mellitus ([facility] )     Dilated cardiomyopathy ([facility] )     History of echocardiogram 11/24/2014    EF 0 30 (30%), Likely mod LV systolic dysfunction  Likely RV dysfunction as well   History of Lexiscan MPI 02/19/2016    EF 0 43 (43%), no prior MI or ischemia   Hx of echocardiogram 04/28/2017    Normal EF, Normal LV systolic function  Mild concentric LV hypertrophy  Mild mitral and tricuspid regurg   Hx: recurrent pneumonia     Hypertension     Hypokalemia 09/04/2020    Long term (current) use of anticoagulants     Morbid (severe) obesity due to excess calories (HCC)     Neuropathy     Pleural effusion on right     Stage 3 chronic kidney disease ([facility] )      History reviewed  No pertinent family history    Past Surgical History:   Procedure Laterality Date    HERNIA REPAIR      IR CHEST TUBE CHECK/CHANGE/REPOSITION/UPSIZE  5/27/2021    IR CHEST TUBE PLACEMENT  5/22/2021    IR PELVIC ANGIOGRAM  12/4/2021    IR THORACENTESIS  8/25/2020    IR THORACENTESIS  2/23/2021    LUNG DECORTICATION Right 6/2/2021    Procedure: DECORTICATION LUNG;  Surgeon: Marlee Kenyon MD;  [facility];  Service: Thoracic    SPLENECTOMY, TOTAL      THORACOSCOPY VIDEO ASSISTED SURGERY (VATS) Right 6/2/2021    Procedure: THORACOSCOPY VIDEO ASSISTED SURGERY (VATS); Surgeon: Marlee Kenyon MD;  [facility];  Service: Thoracic    VASCULAR SURGERY Right     leg     Social History     Tobacco Use    Smoking status: Current Some Day Smoker     Packs/day: 0 25     Types: Cigarettes    Smokeless tobacco: Never Used   Vaping Use    Vaping Use: Never used   Substance Use Topics    Alcohol use: Yes     Comment: 4 or 5 drinks weekly/bloody chani's    Drug use: Never     E-Cigarette/Vaping    E-Cigarette Use Never User      E-Cigarette/Vaping Substances    Nicotine No     THC No     CBD No     Flavoring No     Other No     Unknown No        Family History: non-contributory    Meds/Allergies   Prior to Admission Medications   Prescriptions Last Dose Informant Patient Reported? Taking? Lancets (freestyle) lancets  Self No No   Sig: Test glucose once a day     acetaminophen (TYLENOL) 325 mg tablet   No No   Sig: Take 3 tablets (975 mg total) by mouth every 8 (eight) hours   calcium carbonate (TUMS) 500 mg chewable tablet   No No   Sig: Chew 1 tablet (500 mg total) 2 (two) times a day as needed for indigestion or heartburn   citalopram (CeleXA) 10 mg tablet   Yes No   Patient not taking: Reported on 12/29/2021    furosemide (LASIX) 40 mg tablet   No No   Sig: Take 3 tablets (120 mg total) by mouth 2 (two) times a day   Patient not taking: Reported on 12/29/2021    furosemide (LASIX) 80 mg tablet   Yes No   Sig: Take 80 in the AM, 40 at lunch and 40 in the evening    glucose blood test strip   No No   Sig: Test glucose twice a day   metolazone (ZAROXOLYN) 5 mg tablet   Yes No   Sig: Take 5 mg by mouth as needed metoprolol succinate (TOPROL-XL) 25 mg 24 hr tablet   No No   Sig: Take 1 tablet (25 mg total) by mouth 2 (two) times a day   midodrine (PROAMATINE) 5 mg tablet   No No   Sig: Take 3 tablets (15 mg total) by mouth every 8 (eight) hours   Patient not taking: Reported on 12/29/2021    nicotine (NICODERM CQ) 7 mg/24hr TD 24 hr patch   No No   Sig: Place 1 patch on the skin daily   Patient not taking: Reported on 12/29/2021    polyethylene glycol (MIRALAX) 17 g packet  Self No No   Sig: Take 17 g by mouth daily as needed (constipation)   senna-docusate sodium (SENOKOT S) 8 6-50 mg per tablet   No No   Sig: Take 2 tablets by mouth 2 (two) times a day   warfarin (COUMADIN) 5 mg tablet   No No   Sig: Take 10 mg on 9/24 then 5 mg daily there after  You will need a repeat PT/INR checked on Monday, 9/27  Facility-Administered Medications: None       No Known Allergies    PHYSICAL EXAM    PE limited by:     Objective   Vitals:   First set: Temperature: 98 6 °F (37 °C) (02/08/22 1249)  Pulse: 102 (02/08/22 1249)  Respirations: 18 (02/08/22 1249)  Blood Pressure: 119/67 (02/08/22 1249)  SpO2: 98 % (02/08/22 1249)    Primary Survey:   (A) Airway: intact    (B) Breathing: spontaneous, b/l breath sounds  (C) Circulation: Pulses:   normal  (D) Disabliity:  GCS Total:  15  (E) Expose:  Completed    Secondary Survey: (Click on Physical Exam tab above)  Physical Exam  Vitals and nursing note reviewed  Exam conducted with a chaperone present  Constitutional:       General: He is not in acute distress  Appearance: He is obese  He is not ill-appearing, toxic-appearing or diaphoretic  HENT:      Head: Normocephalic and atraumatic  Comments: No evidence of Rodríguez sign     Right Ear: Tympanic membrane, ear canal and external ear normal  There is no impacted cerumen  Left Ear: Tympanic membrane, ear canal and external ear normal  There is no impacted cerumen        Ears:      Comments: Negative hemotympanum bilaterally  No blood in the external canals  Nose: Nose normal  No congestion or rhinorrhea  Comments: Negative septal hematoma  Mouth/Throat:      Mouth: Mucous membranes are moist       Pharynx: Oropharynx is clear  No oropharyngeal exudate or posterior oropharyngeal erythema  Eyes:      General:         Right eye: No discharge  Left eye: No discharge  Extraocular Movements: Extraocular movements intact  Conjunctiva/sclera: Conjunctivae normal       Pupils: Pupils are equal, round, and reactive to light  Cardiovascular:      Rate and Rhythm: Normal rate and regular rhythm  Pulses: Normal pulses  Heart sounds: Normal heart sounds  No murmur heard  No gallop  Pulmonary:      Effort: Pulmonary effort is normal  No respiratory distress  Breath sounds: Normal breath sounds  No stridor  No wheezing, rhonchi or rales  Comments: No evidence of flail chest   Symmetric chest rise is noted  No jugular venous distension  Chest:      Chest wall: No tenderness  Abdominal:      General: Abdomen is flat  Bowel sounds are normal  There is no distension  Palpations: There is no mass  Tenderness: There is no abdominal tenderness  There is no right CVA tenderness, left CVA tenderness, guarding or rebound  Hernia: No hernia is present  Musculoskeletal:         General: No swelling, tenderness, deformity or signs of injury  Normal range of motion  Cervical back: Normal range of motion  No rigidity or tenderness  Right lower leg: Edema present  Left lower leg: Edema present  Comments: No evidence of central spinal tenderness  No crepitus step-offslaceration or swelling noted    Abrasions and contusions to the left forearm region  No evidence of laceration  No focal bony tenderness deformity  No pain with hip movement/leg rolling      Generalized edema dependent lower extremities and lower abdomen back area   Skin:     General: Skin is warm  Coloration: Skin is not jaundiced or pale  Findings: No bruising, erythema, lesion or rash  Neurological:      General: No focal deficit present  Mental Status: He is alert and oriented to person, place, and time  Mental status is at baseline  Psychiatric:         Mood and Affect: Mood normal          Cervical spine cleared by clinical criteria? Yes     Invasive Devices  Report    Peripheral Intravenous Line            Peripheral IV 02/08/22 Right Antecubital <1 day                Lab Results:   Results Reviewed     Procedure Component Value Units Date/Time    HS Troponin I 2hr [[id number]] Collected: 02/08/22 1457    Lab Status: No result Specimen: Blood from Arm, Right     HS Troponin I 4hr [[id number]]     Lab Status: No result Specimen: Blood     Procalcitonin with AM Reflex [[id number]]     Lab Status: No result Specimen: Blood     Magnesium [[id number]]  (Normal) Collected: 02/08/22 1256    Lab Status: Final result Specimen: Blood from Arm, Right Updated: 02/08/22 1354     Magnesium 2 1 mg/dL     COVID/FLU/RSV - 2 hour TAT [[id number]]  (Normal) Collected: 02/08/22 1256    Lab Status: Final result Specimen: Nasopharyngeal Swab Updated: 02/08/22 1347     SARS-CoV-2 Negative     INFLUENZA A PCR Negative     INFLUENZA B PCR Negative     RSV PCR Negative    Narrative:      FOR PEDIATRIC PATIENTS - copy/paste COVID Guidelines URL to browser: [email or web address]    SARS-CoV-2 assay is a Nucleic Acid Amplification assay intended for the  qualitative detection of nucleic acid from SARS-CoV-2 in nasopharyngeal  swabs  Results are for the presumptive identification of SARS-CoV-2 RNA  Positive results are indicative of infection with SARS-CoV-2, the virus  causing COVID-19, but do not rule out bacterial infection or co-infection  with other viruses   Laboratories within the [place] and its  territories are required to report all positive results to the appropriate  public health authorities  Negative results do not preclude SARS-CoV-2  infection and should not be used as the sole basis for treatment or other  patient management decisions  Negative results must be combined with  clinical observations, patient history, and epidemiological information  This test has not been FDA cleared or approved  This test has been authorized by FDA under an Emergency Use Authorization  (EUA)  This test is only authorized for the duration of time the  declaration that circumstances exist justifying the authorization of the  emergency use of an in vitro diagnostic tests for detection of SARS-CoV-2  virus and/or diagnosis of COVID-19 infection under section 564(b)(1) of  the Act, 21 U  S C  375MKN-9(S)(4), unless the authorization is terminated  or revoked sooner  The test has been validated but independent review by FDA  and CLIA is pending  Test performed using [employer] GeneXpert: This RT-PCR assay targets N2,  a region unique to SARS-CoV-2  A conserved region in the E-gene was chosen  for pan-Sarbecovirus detection which includes SARS-CoV-2      Basic metabolic panel [[id number]]  (Abnormal) Collected: 02/08/22 1256    Lab Status: Final result Specimen: Blood from Arm, Right Updated: 02/08/22 1334     Sodium 128 mmol/L      Potassium 2 5 mmol/L      Chloride 89 mmol/L      CO2 37 mmol/L      ANION GAP 2 mmol/L      BUN 82 mg/dL      Creatinine 1 91 mg/dL      Glucose 128 mg/dL      Calcium 8 1 mg/dL      eGFR 36 ml/min/1 73sq m     Narrative:      Roselyn guidelines for Chronic Kidney Disease (CKD):     Stage 1 with normal or high GFR (GFR > 90 mL/min/1 73 square meters)    Stage 2 Mild CKD (GFR = 60-89 mL/min/1 73 square meters)    Stage 3A Moderate CKD (GFR = 45-59 mL/min/1 73 square meters)    Stage 3B Moderate CKD (GFR = 30-44 mL/min/1 73 square meters)    Stage 4 Severe CKD (GFR = 15-29 mL/min/1 73 square meters)   Stage 5 End Stage CKD (GFR <15 mL/min/1 73 square meters)  Note: GFR calculation is accurate only with a steady state creatinine    NT-BNP PRO [[id number]]  (Abnormal) Collected: 02/08/22 1256    Lab Status: Final result Specimen: Blood from Arm, Right Updated: 02/08/22 1333     NT-proBNP 1,568 pg/mL     HS Troponin 0hr (reflex protocol) [[id number]]  (Normal) Collected: 02/08/22 1256    Lab Status: Final result Specimen: Blood from Arm, Right Updated: 02/08/22 1330     hs TnI 0hr 11 ng/L     Protime-INR [[id number]]  (Abnormal) Collected: 02/08/22 1256    Lab Status: Final result Specimen: Blood from Arm, Right Updated: 02/08/22 1319     Protime 18 5 seconds      INR 1 63    APTT [[id number]]  (Abnormal) Collected: 02/08/22 1256    Lab Status: Final result Specimen: Blood from Arm, Right Updated: 02/08/22 1319     PTT 52 seconds     CBC and differential [[id number]]  (Abnormal) Collected: 02/08/22 1256    Lab Status: Final result Specimen: Blood from Arm, Right Updated: 02/08/22 1306     WBC 5 58 Thousand/uL      RBC 2 56 Million/uL      Hemoglobin 7 1 g/dL      Hematocrit 23 1 %      MCV 90 fL      MCH 27 7 pg      MCHC 30 7 g/dL      RDW 17 9 %      MPV 10 4 fL      Platelets 614 Thousands/uL      nRBC 0 /100 WBCs      Neutrophils Relative 75 %      Immat GRANS % 1 %      Lymphocytes Relative 8 %      Monocytes Relative 11 %      Eosinophils Relative 4 %      Basophils Relative 1 %      Neutrophils Absolute 4 24 Thousands/µL      Immature Grans Absolute 0 03 Thousand/uL      Lymphocytes Absolute 0 45 Thousands/µL      Monocytes Absolute 0 59 Thousand/µL      Eosinophils Absolute 0 22 Thousand/µL      Basophils Absolute 0 05 Thousands/µL     UA w Reflex to Microscopic w Reflex to Culture [[id number]]     Lab Status: No result Specimen: Urine                  Imaging Studies:   Direct to CT: No  TRAUMA - CT chest abdomen pelvis w contrast   Final Result by León Billings MD (02/08 1449)      1    Left abdominal wall hematoma is mildly increased from December 2021   2  Mediastinal/left neck base lymphadenopathy, consider follow-up neck and chest CT in 3 months to evaluate for persistence   3  Right lower lobe airspace opacities, concerning for pneumonia   4  Anasarca   5  Small right pleural effusion   6  Cirrhotic morphology with ascites   7  Additional findings as above            Workstation performed: [id number]         TRAUMA - CT spine cervical wo contrast   Final Result by Bessie Edouard MD (02/08 1430)      1  No cervical spine fracture or traumatic malalignment  2   Cervical lymphadenopathy                 Workstation performed: [id number]         TRAUMA - CT head wo contrast   Final Result by Bessie Edouard MD (02/08 1419)      No acute intracranial abnormality  Workstation performed: [id number]         XR Trauma chest portable   ED Interpretation by Milton Zaldivar DO (02/08 1316)   One-view x-ray of the chest interpreted by myself pending official Radiology report  I see right-sided pleural effusion no traumatic pneumothorax or large infiltrate seen  Cardiomegaly chronic  Procedures  Procedures         ED Course  ED Course as of 02/08/22 1502   Tue Feb 08, 2022   1255 EKG interpreted by myself  EKG dated 02/08  demonstrates AFib a 96 beats per minute, low-voltage QRS, incomplete right bundle-branch block, normal QRS duration, prolonged QTC interval, nonspecific ST into the sided  No STEMI    12 70-year-old male presents from home for evaluation of generalized weakness sent in by visiting nurse  He  She has sustained recent fall and was unable to ambulate there is efforts  Suspect ambulatory dysfunction secondary to generalized edema, which is due to his chronic lymphedema venous insufficiency and CHF  No obvious signs of significant traumatic injury given frequent falls on Coumadin will evaluate with CT brain CT cervical spine chest x-ray at this time    No indication for extremity films at this time  Edema generalized doubt DVT  His blood pressure is low normal on arrival this is consistent with his baseline  His pulse is slightly elevated will monitor closely  No clinical concerns for acute infection  1307 Hemoglobin(!): 7 1  Slightly decreased from recent baseline  1322 POCT INR(!): 1 63   1322 Protime(!): 18 5   1322 PTT(!): 52   1328 Per review of records, patient with fall in decemeber 2021 transferred for abdominal hematoma  Given hx, multiple falls, anticoagulation use will proceed with pan-CT to eval for traumatic/bleeding injuries  1333 hs TnI 0hr: 11   1335 Potassium(!!): 2 5  Hypokalemia noted   1342 IV and PO potassium repletion ordered  1343 Sodium(!): 128   1344 BUN(!): 82   1344 Creatinine(!): 1 91   1344 NT-proBNP(!): 1,568  Near baseline/chronic abnormalities   1412 No acute intracranial abnormality on CT brain by my interpretation  1450 No acute trauma on CT c-spine  Incidental cervical lymphadenopathy noted, unclear significance, no clear clinical correlation at this time  1451 Possible PNA RLL will cover for CAP  May contribute to patient;s weakness  0 CT with adenopathy recommend 3 month follow up CT chest  Will add to problem list with comment  Patient updated  1455 Interval improvement in abdominal wall hematoma from prior visit      1501 Case d/w Dr Cifuentes Artist will admit med surg tele            MDM  Number of Diagnoses or Management Options  Abnormal CT of the chest: new and requires workup  Chronic anticoagulation: established and worsening  Generalized edema: established and worsening  Generalized weakness: new and requires workup  Hypokalemia: new and requires workup  Multiple falls: new and requires workup     Amount and/or Complexity of Data Reviewed  Clinical lab tests: ordered and reviewed  Tests in the radiology section of CPT®: ordered and reviewed  Tests in the medicine section of CPT®: ordered and reviewed  Decide to obtain previous medical records or to obtain history from someone other than the patient: yes  Review and summarize past medical records: yes  Independent visualization of images, tracings, or specimens: yes    Risk of Complications, Morbidity, and/or Mortality  Presenting problems: moderate  Diagnostic procedures: moderate  Management options: moderate    Patient Progress  Patient progress: stable          Disposition  [facility] Transfer: No  Final diagnoses:   Multiple falls   Generalized edema   Chronic anticoagulation   Generalized weakness   Hypokalemia   Abnormal CT of the chest - Mediastinal/left neck base lymphadenopathy, consider follow-up neck and chest CT in 3 months to evaluate for persistence (noted 2/8/22)     Time reflects when diagnosis was documented in both MDM as applicable and the Disposition within this note     Time User Action Codes Description Comment    2/8/2022  1:04 PM Angeline Breeding Add [R29 6] Multiple falls     2/8/2022  1:04 PM Olmitz Breeding Add [R60 1] Generalized edema     2/8/2022  1:04 PM Angeline Breeding Add [Z79 01] Chronic anticoagulation     2/8/2022  1:04 PM Olmitz Breeding Add [R53 1] Generalized weakness     2/8/2022  2:56 PM Harish Oz [E87 6] Hypokalemia     2/8/2022  2:56 PM Jerilynn Medicine [R29 6] Multiple falls     2/8/2022  2:56 PM Jerilynn Medicine [E87 6] Hypokalemia     2/8/2022  2:56 PM Angeline Breeding Add [R93 89] Abnormal CT of the chest     2/8/2022  2:56 PM Jerilynn Medicine [R93 89] Abnormal CT of the chest Mediastinal/left neck base lymphadenopathy, consider follow-up neck and chest CT in 3 months to evaluate for persistence (noted 2/8/22)      ED Disposition     ED Disposition Condition Date/Time Comment    Admit Stable Tue Feb 8, 2022  3:02 PM Case was discussed with KEVIN and the patient's admission status was agreed to be Admission Status: inpatient status to the service of Dr India Mojica   Follow-up Information    None       Patient's Medications   Discharge Prescriptions    No medications on file     No discharge procedures on file      PDMP Review       Value Time User    PDMP Reviewed  Yes 12/14/2021 10:08 AM Yaron Reece PA-C          ED Provider  Electronically Signed by         Cinthya Fuller DO  02/08/22 [id number]

## 2022-02-08 NOTE — ASSESSMENT & PLAN NOTE
129  Likely hypervolemic hyponatremia due to CHF exacerbation  Monitor Na level in setting of diuresis

## 2022-02-08 NOTE — ED NOTES
Primary RN made aware of trauma evaluation being called at this time       Lakesha Burgess RN  02/08/22 [id number]

## 2022-02-08 NOTE — ASSESSMENT & PLAN NOTE
Slightly increased since December  However given drop in hemoglobin and recent trauma will hold warfarin   Continue to check H&H q8 did receive 2 transfusions thus far  Surgical consult appreciated, no intervention needed at this time, abdominal binder ordered, pain meds PRN

## 2022-02-08 NOTE — PLAN OF CARE
Problem: INFECTION - ADULT  Goal: Absence or prevention of progression during hospitalization  Description: INTERVENTIONS:  - Assess and monitor for signs and symptoms of infection  - Monitor lab/diagnostic results  - Monitor all insertion sites, i e  indwelling lines, tubes, and drains  - Monitor endotracheal if appropriate and nasal secretions for changes in amount and color  - Orting appropriate cooling/warming therapies per order  - Administer medications as ordered  - Instruct and encourage patient and family to use good hand hygiene technique  - Identify and instruct in appropriate isolation precautions for identified infection/condition  Outcome: Progressing  Goal: Absence of fever/infection during neutropenic period  Description: INTERVENTIONS:  - Monitor WBC    Outcome: Progressing

## 2022-02-08 NOTE — ED NOTES
Patient unable to void at this time       [facility]  02/08/22 [id number]

## 2022-02-08 NOTE — ED NOTES
Patient stating at this time he fell this afternoon on fouton at home  Stating that his home health nurse was there to visit and he was trying to hold his dog's leash when dog tripped him causing him to fall onto fouton  No LOC, did not hit head  No alert called at this time       Saima Rodriguez RN  02/08/22 [id number]

## 2022-02-08 NOTE — ASSESSMENT & PLAN NOTE
Lab Results   Component Value Date    EGFR 37 02/09/2022    EGFR 36 02/08/2022    EGFR 26 12/13/2021    CREATININE 1 87 (H) 02/09/2022    CREATININE 1 91 (H) 02/08/2022    CREATININE 2 51 (H) 12/13/2021     Likely stage 3B  Renal function has fluctuated over the past 3-4 months but it appears his baseline creatinine is around 2  Monitor renal function closely with diuresis and replete electrolytes  Currently creatinine is downtrending

## 2022-02-08 NOTE — ASSESSMENT & PLAN NOTE
Due a history of alcohol use  Now with ascites  Consulted IR for paracentesis and send labs   GI consult appreciated, recommend outpatient EGD   MELD 24 (INR is in the setting of coumadin use however)

## 2022-02-08 NOTE — INCIDENTAL FINDINGS
The following findings require follow up:  Radiographic finding   Finding: Mediastinal/left neck base lymphadenopathy   Follow up required: CT neck   Follow up should be done within 3 month(s)    Please notify the following clinician to assist with the follow up:   Dr Pearson Hamman

## 2022-02-08 NOTE — ASSESSMENT & PLAN NOTE
Rate controlled on toprol XL   Hold coumadin due to anemia and abdominal wall hematoma   INR on 2/8 subtherapeutic at 1 6

## 2022-02-09 NOTE — CONSULTS
Consultation - [facility]  Ele Garcia 57 y o  male MRN: [id number]  Unit/Bed#: 403-01 Encounter: [id number]        [address]    Reason for Consult / Principal Problem:     1  Ascites      ASSESSMENT AND PLAN:      1  Ascites  2  Cirrhosis    Patient has a history of cirrhosis with etiology suspected likely related to fatty liver versus alcohol use however recommended transjugular biopsy with portal pressure measurements to evaluate for definitive diagnosis was not yet completed  Meld 24 however INR elevated due to Coumadin use  Ascites   Patient was found to have mild volume ascites centered within the upper abdomen on CT scan however IR was consulted and reports unable to complete paracentesis due to location and amount of ascites  Patient currently on Lasix at home for cardiac disease which is managed by Cardiology  Patient admitted with significant bodily edema and found to have acute on chronic diastolic CHF  He was started on Lasix drip  - agree with Lasix drip  - strict I&O   - low-sodium diet  - serial abdominal exams    Esophageal varices   His last EGD in October 2020 revealed no varices  He is recommended to repeat this year  Would recommend outpatient follow-up to set this up  Patient denies any significant GI symptoms or melena  - outpatient follow-up for esophageal varices    Hepatic encephalopathy   Patient denies any issues with confusion and has no history of hepatic encephalopathy  Patient is alert and oriented x4 on exam, no asterixis noted  [address]    Last ultrasound of liver in August of this year revealed no suspicious masses  Last AFP in May 2 7  Transplant candidacy   Patient not a candidate due to comorbidities and elevated BMI  3  Anemia     Patient with a history of chronic anemia with a baseline over the past year in the 7-9 range  He had a drop in his hemoglobin to 6 3 this morning  He denies any hematemesis, hematochezia or melena    He denies any significant GI symptoms other than abdominal distension and bloating  His last EGD and colonoscopy were in October 2020  His EGD did not reveal any varices  His colonoscopy revealed multiple small and large AVMs treated with APC, internal and external hemorrhoids  Patient does take Coumadin for AFib which is currently on hold  Patient recently had some falls at home and does have large rectus sheath hematoma  Will hold off on any procedures at this time is patient denies any overt bleeding and suspect drop in hemoglobin related to hematoma  - agree with holding anticoagulation until patient is stabilized   - trend H&H   - transfuse as needed  - monitor stool color and consistency   - defer procedures at this time  ______________________________________________________________________    HPI:  Kath Cobian is a 57-year-old male past medical history of congestive heart failure, anemia, AFib on Coumadin and cirrhosis that presented to the emergency department with increased generalized body edema, weakness and frequent falls and was found to have acute on chronic diastolic congestive heart failure in right lower lobe pneumonia  Patient also has a history of cirrhosis with etiology suspected likely related to fatty liver versus alcohol use  Most recent lab work reveals an elevated alkaline phosphatase, decreased albumin and elevated INR however patient is on Coumadin  Platelets have been normal   Patient was found to have a decrease in hemoglobin to 6 3 this morning  He denies any hematemesis, hematochezia or melena  He denies any significant GI symptoms other than abdominal distension and bloating  Patient's last EGD and colonoscopy were in October 2020  His EGD revealed a hiatal hernia and a normal stomach and duodenum  His colonoscopy revealed multiple small and large AVMs treated with APC, one 3 mm adenomatous ascending colon polyp, internal and external hemorrhoids    He was recommended to repeat in 7 years due to history of colon polyps Patient was found to have mild volume ascites centered within the upper abdomen on CT scan however IR was consulted and reports unable to complete paracentesis due to location and amount of ascites  Patient currently on Lasix for cardiac disease which is managed by Cardiology  REVIEW OF SYSTEMS:    CONSTITUTIONAL: Denies any fever, chills, rigors, and weight loss  HEENT: No earache or tinnitus  Denies hearing loss or visual disturbances  CARDIOVASCULAR: No chest pain or palpitations  RESPIRATORY: Denies any cough, hemoptysis, shortness of breath or dyspnea on exertion  GASTROINTESTINAL: As noted in the History of Present Illness  GENITOURINARY: No problems with urination  Denies any hematuria or dysuria  NEUROLOGIC: No dizziness or vertigo, denies headaches  MUSCULOSKELETAL: Denies any muscle or joint pain  SKIN: Denies skin rashes or itching  ENDOCRINE: Denies excessive thirst  Denies intolerance to heat or cold  PSYCHOSOCIAL: Denies depression or anxiety  Denies any recent memory loss  Historical Information   Past Medical History:   Diagnosis Date    A-fib [facility])     Acute renal failure superimposed on stage 3b chronic kidney disease ([facility] ) 03/15/2021    Cardiac disease     Chronic combined systolic (congestive) and diastolic (congestive) heart failure (HCC)     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 ascites (HCC)     Class 3 severe obesity due to excess calories with serious comorbidity and body mass index (BMI) of 45 0 to 49 9 in adult [facility]) 10/17/2018    Diabetes mellitus ([facility] )     Dilated cardiomyopathy ([place] [address] )     History of echocardiogram 11/24/2014    EF 0 30 (30%), Likely mod LV systolic dysfunction  Likely RV dysfunction as well   History of Lexiscan MPI 02/19/2016    EF 0 43 (43%), no prior MI or ischemia   Hx of echocardiogram 04/28/2017    Normal EF, Normal LV systolic function  Mild concentric LV hypertrophy   Mild mitral and tricuspid regurg   Hx: recurrent pneumonia     Hypertension     Hypokalemia 09/04/2020    Long term (current) use of anticoagulants     Morbid (severe) obesity due to excess calories (HCC)     Neuropathy     Pleural effusion on right     Stage 3 chronic kidney disease ([facility] )      Past Surgical History:   Procedure Laterality Date    HERNIA REPAIR      IR CHEST TUBE CHECK/CHANGE/REPOSITION/UPSIZE  5/27/2021    IR CHEST TUBE PLACEMENT  5/22/2021    IR PELVIC ANGIOGRAM  12/4/2021    IR THORACENTESIS  8/25/2020    IR THORACENTESIS  2/23/2021    LUNG DECORTICATION Right 6/2/2021    Procedure: DECORTICATION LUNG;  Surgeon: Efe Montgomery MD;  [facility];  Service: Thoracic    SPLENECTOMY, TOTAL      THORACOSCOPY VIDEO ASSISTED SURGERY (VATS) Right 6/2/2021    Procedure: THORACOSCOPY VIDEO ASSISTED SURGERY (VATS); Surgeon: Efe Montgomery MD;  [facility];  Service: Thoracic    VASCULAR SURGERY Right     leg     Social History   Social History     Substance and Sexual Activity   Alcohol Use Yes    Comment: 4 or 5 drinks weekly/bloody chani's     Social History     Substance and Sexual Activity   Drug Use Never     Social History     Tobacco Use   Smoking Status Current Some Day Smoker    Packs/day: 0 25    Types: Cigarettes   Smokeless Tobacco Never Used     History reviewed  No pertinent family history      Meds/Allergies     Medications Prior to Admission   Medication    aspirin 81 mg chewable tablet    calcium carbonate (TUMS) 500 mg chewable tablet    senna-docusate sodium (SENOKOT S) 8 6-50 mg per tablet    warfarin (COUMADIN) 5 mg tablet    acetaminophen (TYLENOL) 325 mg tablet    citalopram (CeleXA) 10 mg tablet    glucose blood test strip    Lancets (freestyle) lancets    metoprolol succinate (TOPROL-XL) 25 mg 24 hr tablet    midodrine (PROAMATINE) 5 mg tablet    nicotine (NICODERM CQ) 7 mg/24hr TD 24 hr patch    polyethylene glycol (MIRALAX) 17 g packet Current Facility-Administered Medications   Medication Dose Route Frequency    acetaminophen (TYLENOL) tablet 650 mg  650 mg Oral Q6H PRN    azithromycin (ZITHROMAX) 500 mg in sodium chloride 0 9 % 250 mL IVPB  500 mg Intravenous Q24H    cefTRIAXone (ROCEPHIN) IVPB (premix in dextrose) 1,000 mg 50 mL  1,000 mg Intravenous Q24H    furosemide (LASIX) 500 mg infusion 50 mL  10 mg/hr Intravenous Continuous    influenza vaccine, recombinant, quadrivalent (FLUBLOK) IM injection 0 5 mL  0 5 mL Intramuscular Prior to discharge    metoprolol succinate (TOPROL-XL) 24 hr tablet 25 mg  25 mg Oral BID    oxyCODONE (ROXICODONE) immediate release tablet 10 mg  10 mg Oral Q4H PRN    oxyCODONE (ROXICODONE) IR tablet 5 mg  5 mg Oral Q4H PRN    potassium chloride (K-DUR,KLOR-CON) CR tablet 40 mEq  40 mEq Oral BID    senna-docusate sodium (SENOKOT S) 8 6-50 mg per tablet 2 tablet  2 tablet Oral BID       No Known Allergies        Objective     Blood pressure 97/64, pulse 99, temperature 97 9 °F (36 6 °C), resp  rate 16, height 6' (1 829 m), weight (!) 172 kg (380 lb), SpO2 100 %  Body mass index is 51 54 kg/m²  Intake/Output Summary (Last 24 hours) at 2/9/2022 1250  Last data filed at 2/9/2022 4464  Gross per 24 hour   Intake 890 ml   Output 950 ml   Net -60 ml         PHYSICAL EXAM:      General Appearance:   Alert, cooperative, no distress   HEENT:   Normocephalic, atraumatic, anicteric      Neck:  Supple, symmetrical, trachea midline   Lungs:   Clear to auscultation bilaterally; no rales, rhonchi or wheezing; respirations unlabored    Heart[de-identified]   Regular rate and rhythm; no murmur, rub, or gallop     Abdomen:   Soft, non-tender, distended; normal bowel sounds; no masses, no organomegaly    Genitalia:   Deferred    Rectal:   Deferred    Extremities:  No cyanosis, clubbing or edema    Pulses:  2+ and symmetric all extremities    Skin:  No jaundice, rashes, or lesions    Lymph nodes:  No palpable cervical lymphadenopathy        Lab Results:   Admission on 02/08/2022   Component Date Value    WBC 02/08/2022 5 58     RBC 02/08/2022 2 56*    Hemoglobin 02/08/2022 7 1*    Hematocrit 02/08/2022 23 1*    MCV 02/08/2022 90     MCH 02/08/2022 27 7     MCHC 02/08/2022 30 7*    RDW 02/08/2022 17 9*    MPV 02/08/2022 10 4     Platelets 87/32/4074 184     nRBC 02/08/2022 0     Neutrophils Relative 02/08/2022 75     Immat GRANS % 02/08/2022 1     Lymphocytes Relative 02/08/2022 8*    Monocytes Relative 02/08/2022 11     Eosinophils Relative 02/08/2022 4     Basophils Relative 02/08/2022 1     Neutrophils Absolute 02/08/2022 4 24     Immature Grans Absolute 02/08/2022 0 03     Lymphocytes Absolute 02/08/2022 0 45*    Monocytes Absolute 02/08/2022 0 59     Eosinophils Absolute 02/08/2022 0 22     Basophils Absolute 02/08/2022 0 05     Sodium 02/08/2022 128*    Potassium 02/08/2022 2 5*    Chloride 02/08/2022 89*    CO2 02/08/2022 37*    ANION GAP 02/08/2022 2*    BUN 02/08/2022 82*    Creatinine 02/08/2022 1 91*    Glucose 02/08/2022 128     Calcium 02/08/2022 8 1*    eGFR 02/08/2022 36     Protime 02/08/2022 18 5*    INR 02/08/2022 1 63*    PTT 02/08/2022 52*    Color, UA 02/08/2022 Yellow     Clarity, UA 02/08/2022 Clear     Specific Gravity, UA 02/08/2022 1 010     pH, UA 02/08/2022 7 0     Leukocytes, UA 02/08/2022 Negative     Nitrite, UA 02/08/2022 Negative     Protein, UA 02/08/2022 Negative     Glucose, UA 02/08/2022 Negative     Ketones, UA 02/08/2022 Negative     Urobilinogen, UA 02/08/2022 0 2     Bilirubin, UA 02/08/2022 Negative     Blood, UA 02/08/2022 Trace-lysed*    hs TnI 0hr 02/08/2022 11     NT-proBNP 02/08/2022 1,568*    SARS-CoV-2 02/08/2022 Negative     INFLUENZA A PCR 02/08/2022 Negative     INFLUENZA B PCR 02/08/2022 Negative     RSV PCR 02/08/2022 Negative     hs TnI 2hr 02/08/2022 10     Delta 2hr hsTnI 02/08/2022 -1     Magnesium 02/08/2022 2 1     hs TnI 4hr 02/08/2022 11     Delta 4hr hsTnI 02/08/2022 0     Legionella Urinary Antig* 02/08/2022 Negative     Strep pneumoniae antigen* 02/08/2022 Negative     Hemoglobin 02/08/2022 7 0*    Hematocrit 02/08/2022 22 4*    Procalcitonin 02/08/2022 0 54*    RBC, UA 02/08/2022 1-2     WBC, UA 02/08/2022 None Seen     Epithelial Cells 02/08/2022 None Seen     Bacteria, UA 02/08/2022 Occasional     Procalcitonin 02/09/2022 0 55*    Hemoglobin 02/09/2022 6 3*    Hematocrit 02/09/2022 19 9*    ABO Grouping 02/09/2022 O     Rh Factor 02/09/2022 Positive     Antibody Screen 02/09/2022 Negative     Specimen Expiration Date 02/09/2022 [id number]     Unit Product Code 02/09/2022 [id number]     Unit Number 02/09/2022 [id number]     Unit ABO 02/09/2022 O     Unit RH 02/09/2022 POS     Crossmatch 02/09/2022 Compatible     Unit Dispense Status 02/09/2022 Presumed Trans     Unit Product Volume 02/09/2022 350     WBC 02/09/2022 5 50     RBC 02/09/2022 2 38*    Hemoglobin 02/09/2022 6 6*    Hematocrit 02/09/2022 21 4*    MCV 02/09/2022 90     MCH 02/09/2022 27 7     MCHC 02/09/2022 30 8*    RDW 02/09/2022 17 7*    MPV 02/09/2022 10 8     Platelets 08/83/7655 178     nRBC 02/09/2022 0     Neutrophils Relative 02/09/2022 76*    Immat GRANS % 02/09/2022 1     Lymphocytes Relative 02/09/2022 7*    Monocytes Relative 02/09/2022 10     Eosinophils Relative 02/09/2022 5     Basophils Relative 02/09/2022 1     Neutrophils Absolute 02/09/2022 4 27     Immature Grans Absolute 02/09/2022 0 04     Lymphocytes Absolute 02/09/2022 0 38*    Monocytes Absolute 02/09/2022 0 52     Eosinophils Absolute 02/09/2022 0 25     Basophils Absolute 02/09/2022 0 04     Sodium 02/09/2022 129*    Potassium 02/09/2022 3 2*    Chloride 02/09/2022 90*    CO2 02/09/2022 35*    ANION GAP 02/09/2022 4     BUN 02/09/2022 80*    Creatinine 02/09/2022 1 87*    Glucose 02/09/2022 141*    Calcium 02/09/2022 8 0*    Corrected Calcium 02/09/2022 9 7     AST 02/09/2022 22     ALT 02/09/2022 11*    Alkaline Phosphatase 02/09/2022 143*    Total Protein 02/09/2022 6 7     Albumin 02/09/2022 1 9*    Total Bilirubin 02/09/2022 0 82     eGFR 02/09/2022 37     Magnesium 02/09/2022 2 2     Hemoglobin 02/09/2022 7 6*    Hematocrit 02/09/2022 24 5*    Unit Product Code 02/09/2022 [id number]     Unit Number 02/09/2022 [id number]     Unit ABO 02/09/2022 O     Unit RH 02/09/2022 NEG     Crossmatch 02/09/2022 Compatible     Unit Dispense Status 02/09/2022 Return to Inv     Unit Product Volume 02/09/2022 350     Ventricular Rate 02/08/2022 92     Atrial Rate 02/08/2022 77     QRSD Interval 02/08/2022 110     QT Interval 02/08/2022 412     QTC Interval 02/08/2022 509     QRS Axis 02/08/2022 13     T Wave Axis 02/08/2022 7     Ventricular Rate 02/08/2022 101     Atrial Rate 02/08/2022 117     QRSD Interval 02/08/2022 108     QT Interval 02/08/2022 384     QTC Interval 02/08/2022 497     QRS Axis 02/08/2022 17     T Wave Axis 02/08/2022 4     Ventricular Rate 02/08/2022 96     Atrial Rate 02/08/2022 129     QRSD Interval 02/08/2022 116     QT Interval 02/08/2022 360     QTC Interval 02/08/2022 454     QRS Axis 02/08/2022 9     T Wave Axis 02/08/2022 1     Ventricular Rate 02/08/2022 96     Atrial Rate 02/08/2022 107     QRSD Interval 02/08/2022 110     QT Interval 02/08/2022 402     QTC Interval 02/08/2022 507     QRS Axis 02/08/2022 78     T Wave Axis 02/08/2022 -22        Imaging Studies: I have personally reviewed pertinent imaging studies

## 2022-02-09 NOTE — UTILIZATION REVIEW
Initial Clinical Review    Admission: Date/Time/Statement:   Admission Orders (From admission, onward)     Ordered        02/08/22 1502  Inpatient Admission  Once                      Orders Placed This Encounter   Procedures    Inpatient Admission     Standing Status:   Standing     Number of Occurrences:   1     Order Specific Question:   Level of Care     Answer:   Med Surg [16]     Order Specific Question:   Estimated length of stay     Answer:   More than 2 Midnights     Order Specific Question:   Certification     Answer:   I certify that inpatient services are medically necessary for this patient for a duration of greater than two midnights  See H&P and MD Progress Notes for additional information about the patient's course of treatment  ED Arrival Information     Expected Arrival Acuity    - 2/8/2022 12:45 Emergent         Means of arrival Escorted by Service Admission type    Ambulance [facility] Emergency         Arrival complaint    edema        Chief Complaint   Patient presents with    Edema     patient reports generalized body edema for months    Trauma    Fall       Initial Presentation:     57year old male presents to ed from home via ems for evaluation and treatment of generalized body edema  PMHXl CIRRHOSIS AND DIASTOLIC HEART FAILURE  Visiting nurse found patient that patient sustained a fall and had severe edema  Clinical assessment of potassium 2 5, sodium 128, bun 82, cr 1 91 ( baseline 2 0) ,bnp 1568,  Hb 7 1, hct 23     Imaging shows left abdominal wall hematoma increased in size, right lower lobe opacities concerning fpr pneumonia, anasarca, cirrhosis with ascites, right pleural effusion  Ekg with atrial fibrillation, st / t wave abnormality, incomplete right bundle branch block  Prior discharge weight is closer to 340lbs, approximately 30-40 lbs up from dry weightTreated initially with po kdur 40 meq and iv kcl 20 meq  Po zithromax and iv lasix   Admit to inpatient med surg for acute on chronic chf, rll pneumonia, abdominal wall hematoma  Consult general surgery for abdominal wall hematoma    Patient with recent admission in December for fall and abdominal wall/rectus sheath hematoma  That point time no intervention was done  It is possible that the CT scan today which shows enlargement could just be interval enlargement after the initial fall and does not really reflect any acute enlargement  For now continue to trend his hemoglobin  If continues to fall he becomes hemodynamically unstable he may require repeat CTA abdomen pelvis to determine if actively  - serial hemoglobin, transfuse as necessary  - abdominal binder  - pain control    Date: 2-9-22    Day 2: inpatient med surg     Volume overloaded  Transitioned to lasix gtt  Salt and fluid restricted diet  Daily weight, intake / output  Trend hb and hct q8 hr Hb 6 3/6 6/ 7 6  Transfused 1 U prbc  Consult cardiology and consult gastroenterology  Sputum culture shows gram positive cocci and gram negative rods  Procalcitonin 0 56 Treat pneumonia with iv ceftriaxone  PT/OT evaluations related to fall     Consult cardiology      He appears grossly volume overloaded on exam and is at least 30 lb over his dry weight  He has been initiated on a Lasix drip by the primary team   This will be continued for a net negative fluid balance  Daily weights and strict I/O are imperative      Otherwise he remains in atrial fibrillation with adequate rate control  Continue metoprolol as prescribed  His Coumadin is on hold due to acute on chronic anemia  His hemoglobin dropped to 6 3 overnight and he has been receiving packed red blood cells  He was noted to have an abdominal wall hematoma upon admission  Warfarin should be resumed for a goal INR of 2 to 3 when deemed appropriate        Consult gastroenterology    Known cirrhostic who p/w edema, weakness and fall  C/f pneumonia and diastolic CHF  As an outpt awaiting portal pressure measurements  MELD 24 (in setting of coumadin use)  Now on a lasix drip and will need to maximize his diuretic therapy upon discharge (with close outpt follow-up)  Low salt diet and fluid restriction  Repeat EGD for variceal screening as an outpt  No PSE at this time  Repeat US as an outpt  He has chronic anemia with recent drop  He has a abdominal wall hematoma  Trend H/H  He denies overt bleeding but monitor stools for melena or hematochezia  He may need repeat colonoscopy (in addition to EGD) given prior AVMs (inpatient if c/f GI bleeding, or outpatient)  Would ideally want to defer in the setting of CHF and pneumonia     Therapy evaluation   Bed mobility max assist of 2  Pt initially hayley argumentative about sit to stand from EOB requesting for therapists to "pull up on my arms" increased time spent educating pt on the dangers of having anyone pull on his arms to assist to stand and instructing on safe transfer techniques  Pt becomes very agitated with same demonstrating decreased insight into overall safety  Increased emotional support and education provided to descelate and ultimately safely and successfully compelted sit to stand and funcitonal mobility with significant noted change in emotional state, brighter affect  Post acute inpatient rehab recommended        ED Triage Vitals [02/08/22 1249]   98 6 °F (37 °C) 102 18 119/67 98 %      Tympanic Monitor         No Pain          02/08/22 (!) 172 kg (380 lb)     Additional Vital Signs:     Date/Time Temp Pulse Resp BP MAP (mmHg) SpO2 O2 Device   02/09/22 14:33:54 97 7 °F (36 5 °C) 100 16 96/63 74 93 % None (Room air)   02/09/22 0855 -- -- -- 97/64 75 -- None (Room air)   02/09/22 07:30:40 97 9 °F (36 6 °C) 99 16 96/64 75 100 % --   02/09/22 0516 97 4 °F (36 3 °C)   Abnormal  99 17 98/65 76 98 % --   02/09/22 04:46:40 98 6 °F (37 °C) 99 16 102/68 79 98 % --   02/09/22 04:45:16 -- 97 -- 102/68 79 99 % --   02/09/22 0425 -- -- 20 -- -- -- --   02/09/22 04:24:33 98 2 °F (36 8 °C) 112 Abnormal  -- 94/55 68 97 % --   02/09/22 03:43:16 98 2 °F (36 8 °C) 98 17 98/65 76 94 % --   02/08/22 15:38:37 -- 95 18 107/66 80 99 % --   02/08/22 15:37:59 -- 93 18 107/66 80 99 % --   02/08/22 1500 -- 92 18 114/76 -- 94 % None (Room air)   02/08/22 1430 -- 106 Abnormal  18 108/87 -- 98 % None (Room air)   02/08/22 1415 -- 98 18 115/72 -- 98 % None (Room air)   02/08/22 1400 -- 101 18 116/68 -- 97 % None (Room air)   02/08/22 1345 -- 104 18 113/74 -- 96 % None (Room air)   02/08/22 1330 -- 98 18 120/78 -- 95 % None (Room air)   02/08/22 1305 -- 98 18 121/87 -- 97 % None (Room air)   02/08/22 1300 -- 102 18 116/74 89 92 % None (Room air)   02/08/22 1249 98 6 °F (37 °C) 102 18 119/67 -- 98 % None (Room air)           Pertinent Labs/Diagnostic Test Results:   Collection Time Result Time Vent R Atrial R MD Int  QRSD Int  QT Int  QTC Int  P Axis QRS Axis T Wave Ax    02/08/22 12:43:11 02/09/22 09:19:04 96 107  110 402 507  78 -22                       Atrial fibrillation   Low voltage QRS   Incomplete right bundle branch block   Nonspecific ST and T wave abnormality   Abnormal ECG   When compared with ECG of 06-DEC-2021 01:08,   Nonspecific T wave abnormality, worse in Inferior leads                   TRAUMA - CT chest abdomen pelvis w contrast   Final (02/08 1449)      1  Left abdominal wall hematoma is mildly increased from December 2021   2  Mediastinal/left neck base lymphadenopathy, consider follow-up neck and chest CT in 3 months to evaluate for persistence   3  Right lower lobe airspace opacities, concerning for pneumonia   4  Anasarca   5  Small right pleural effusion   6  Cirrhotic morphology with ascites         TRAUMA - CT spine cervical wo contrast   Final   (02/08 1430)      1  No cervical spine fracture or traumatic malalignment     2   Cervical lymphadenopathy             TRAUMA - CT head wo contrast   Final  (02/08 1419)      No acute intracranial abnormality  XR Trauma chest portable      Final   (02/08 1653)      Cardiomegaly, small right pleural effusion and right lower lobe airspace opacity          Results from last 7 days   Lab Units 02/08/22  1256   SARS-COV-2  Negative     Results from last 7 days   Lab Units 02/09/22  0913 02/09/22  0520 02/09/22  0145 02/08/22  1732 02/08/22  1256   WBC Thousand/uL  --  5 50  --   --  5 58   HEMOGLOBIN g/dL 7 6* 6 6* 6 3* 7 0* 7 1*   HEMATOCRIT % 24 5* 21 4* 19 9* 22 4* 23 1*   PLATELETS Thousands/uL  --  178  --   --  184   NEUTROS ABS Thousands/µL  --  4 27  --   --  4 24         Results from last 7 days   Lab Units 02/09/22  0520 02/08/22  1256   SODIUM mmol/L 129* 128*   POTASSIUM mmol/L 3 2* 2 5*   CHLORIDE mmol/L 90* 89*   CO2 mmol/L 35* 37*   ANION GAP mmol/L 4 2*   BUN mg/dL 80* 82*   CREATININE mg/dL 1 87* 1 91*   EGFR ml/min/1 73sq m 37 36   CALCIUM mg/dL 8 0* 8 1*   MAGNESIUM mg/dL 2 2 2 1     Results from last 7 days   Lab Units 02/09/22  0520   AST U/L 22   ALT U/L 11*   ALK PHOS U/L 143*   TOTAL PROTEIN g/dL 6 7   ALBUMIN g/dL 1 9*   TOTAL BILIRUBIN mg/dL 0 82         Results from last 7 days   Lab Units 02/09/22  0520 02/08/22  1256   GLUCOSE RANDOM mg/dL 141* 128       Results from last 7 days   Lab Units 02/08/22  1752 02/08/22  1457 02/08/22  1256   HS TNI 0HR ng/L  --   --  11   HS TNI 2HR ng/L  --  10  --    HSTNI D2 ng/L  --  -1  --    HS TNI 4HR ng/L 11  --   --    HSTNI D4 ng/L 0  --   --          Results from last 7 days   Lab Units 02/08/22  1256 02/03/22  0000   PROTIME seconds 18 5*  --    INR  1 63* 1 7   PTT seconds 52*  --          Results from last 7 days   Lab Units 02/09/22  0520 02/08/22  1732   PROCALCITONIN ng/ml 0 55* 0 54*       Results from last 7 days   Lab Units 02/08/22  1256   NT-PRO BNP pg/mL 1,568*         Results from last 7 days   Lab Units 02/08/22  1754   CLARITY UA  Clear   COLOR UA  Yellow   SPEC GRAV UA  1 010   PH UA  7 0   GLUCOSE UA mg/dl Negative   KETONES UA mg/dl Negative   BLOOD UA  Trace-lysed*   PROTEIN UA mg/dl Negative   NITRITE UA  Negative   BILIRUBIN UA  Negative   UROBILINOGEN UA E U /dl 0 2   LEUKOCYTES UA  Negative   WBC UA /hpf None Seen   RBC UA /hpf 1-2   BACTERIA UA /hpf Occasional   EPITHELIAL CELLS WET PREP /hpf None Seen     Results from last 7 days   Lab Units 02/08/22  1732 02/08/22  1256   STREP PNEUMONIAE ANTIGEN, URINE  Negative  --    LEGIONELLA URINARY ANTIGEN  Negative  --    INFLUENZA A PCR   --  Negative   INFLUENZA B PCR   --  Negative   RSV PCR   --  Negative           Results from last 7 days   Lab  02/08/22  1910   GRAM STAIN RESULT Sputum  1+ Epithelial cells per low power field*  1+ Polys*  3+ Gram positive cocci in pairs*  Rare Gram negative rods*               ED Treatment:   Medication Administration from 02/08/2022 1245 to 02/08/2022 1524       Date/Time Order Dose Route Action     02/08/2022 1423 potassium chloride (K-DUR,KLOR-CON) CR tablet 40 mEq 40 mEq Oral Given     02/08/2022 1423 potassium chloride 20 mEq IVPB (premix) 20 mEq Intravenous New Bag     02/08/2022 1513 azithromycin (ZITHROMAX) tablet 500 mg 500 mg Oral Given     02/08/2022 1513 furosemide (LASIX) injection 40 mg 40 mg Intravenous Given        Past Medical History:   Diagnosis Date    A-fib ([place] [address] )     Acute renal failure superimposed on stage 3b chronic kidney disease ([facility] ) 03/15/2021    Cardiac disease     Chronic combined systolic (congestive) and diastolic (congestive) heart failure (HCC)     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 ascites (HCC)     Class 3 severe obesity due to excess calories with serious comorbidity and body mass index (BMI) of 45 0 to 49 9 in adult ([facility] ) 10/17/2018    Diabetes mellitus ([facility] )     Dilated cardiomyopathy ([facility] )     History of echocardiogram 11/24/2014    EF 0 30 (30%), Likely mod LV systolic dysfunction  Likely RV dysfunction as well      History of Lexiscan MPI 02/19/2016    EF 0 43 (43%), no prior MI or ischemia   Hx of echocardiogram 04/28/2017    Normal EF, Normal LV systolic function  Mild concentric LV hypertrophy  Mild mitral and tricuspid regurg   Hx: recurrent pneumonia     Hypertension     Hypokalemia 09/04/2020    Long term (current) use of anticoagulants     Morbid (severe) obesity due to excess calories (HCC)     Neuropathy     Pleural effusion on right     Stage 3 chronic kidney disease (HCC)      Present on Admission:       Abdominal wall hematoma   Acute on chronic diastolic (congestive) heart failure (HCC)   Chronic anemia   Hyponatremia   Permanent atrial fibrillation (HCC)   Cirrhosis of liver with ascites (HCC)   Morbid (severe) obesity due to excess calories (HCC)      Admitting Diagnosis: Edema [R60 9]  Hypokalemia [E87 6]  Generalized edema [R60 1]  Chronic anticoagulation [Z79 01]  Abnormal CT of the chest [R93 89]  Abdominal wall hematoma [S30 1XXA]  Generalized weakness [R53 1]  Multiple falls [R29 6]  Acute on chronic diastolic (congestive) heart failure (HCC) [I50 33]      Age/Sex: 57 y o  male    Scheduled Medications:    cefTRIAXone, 1,000 mg, Intravenous, Q24H  metoprolol succinate, 25 mg, Oral, BID  potassium chloride, 40 mEq, Oral, BID  senna-docusate sodium, 2 tablet, Oral, BID      Continuous IV Infusions:  furosemide, 10 mg/hr, Intravenous, Continuous      PRN Meds:  acetaminophen, 650 mg, Oral, Q6H PRN  influenza vaccine, 0 5 mL, Intramuscular, Prior to discharge  oxyCODONE, 10 mg, Oral, Q4H PRN  oxyCODONE, 5 mg, Oral, Q4H PRN        IP CONSULT TO CARDIOLOGY  IP CONSULT TO ACUTE CARE SURGERY  IP CONSULT TO GASTROENTEROLOGY  IP CONSULT TO CASE MANAGEMENT    Network Utilization Review Department  ATTENTION: Please call with any questions or concerns to [phone] and carefully listen to the prompts so that you are directed to the right person   All voicemails are confidential   Keri Bee all requests for admission clinical reviews, approved or denied determinations and any other requests to dedicated fax number below belonging to the campus where the patient is receiving treatment   List of dedicated fax numbers for the Facilities:  [address] [place] [address] DENIALS (Administrative/Medical Necessity) [phone]   [address] (Maternity/NICU/Pediatrics) [phone]   [address]  [address] Medical [place] [address] [address] [place] [address] [address] [facility] [place] [address] [phone]

## 2022-02-09 NOTE — PHYSICAL THERAPY NOTE
Physical Therapy Evaluation    Patient Name: Lizette Boyd    GHCPF'O Date: 2/9/2022     Problem List  Principal Problem:    Acute on chronic diastolic (congestive) heart failure (HCC)  Active Problems:    Cirrhosis of liver with ascites (HCC)    Chronic anemia    Permanent atrial fibrillation (HCC)    Hyponatremia    CKD (chronic kidney disease)    Morbid (severe) obesity due to excess calories ([facility] )    Abdominal wall hematoma    RLL pneumonia       Past Medical History  Past Medical History:   Diagnosis Date    A-fib ([facility] )     Acute renal failure superimposed on stage 3b chronic kidney disease ([facility] ) 03/15/2021    Cardiac disease     Chronic combined systolic (congestive) and diastolic (congestive) heart failure (HCC)     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 ascites (HCC)     Class 3 severe obesity due to excess calories with serious comorbidity and body mass index (BMI) of 45 0 to 49 9 in adult ([facility] ) 10/17/2018    Diabetes mellitus ([facility] )     Dilated cardiomyopathy ([facility] )     History of echocardiogram 11/24/2014    EF 0 30 (30%), Likely mod LV systolic dysfunction  Likely RV dysfunction as well   History of Lexiscan MPI 02/19/2016    EF 0 43 (43%), no prior MI or ischemia   Hx of echocardiogram 04/28/2017    Normal EF, Normal LV systolic function  Mild concentric LV hypertrophy  Mild mitral and tricuspid regurg      Hx: recurrent pneumonia     Hypertension     Hypokalemia 09/04/2020    Long term (current) use of anticoagulants     Morbid (severe) obesity due to excess calories (HCC)     Neuropathy     Pleural effusion on right     Stage 3 chronic kidney disease [facility])         Past Surgical History  Past Surgical History:   Procedure Laterality Date    HERNIA REPAIR      IR CHEST TUBE CHECK/CHANGE/REPOSITION/UPSIZE  5/27/2021    IR CHEST TUBE PLACEMENT  5/22/2021    IR PELVIC ANGIOGRAM  12/4/2021    IR THORACENTESIS  8/25/2020    IR THORACENTESIS  2/23/2021    LUNG DECORTICATION Right 6/2/2021    Procedure: DECORTICATION LUNG;  Surgeon: Allegra Valadez MD;  [facility]: [facility];  Service: Thoracic    SPLENECTOMY, TOTAL      THORACOSCOPY VIDEO ASSISTED SURGERY (VATS) Right 6/2/2021    Procedure: THORACOSCOPY VIDEO ASSISTED SURGERY (VATS); Surgeon: Allegra Valadez MD;  [facility];  Service: Thoracic    VASCULAR SURGERY Right     leg           02/09/22 1108   PT Last Visit   PT Visit Date 02/09/22   Note Type   Note type Evaluation   Pain Assessment   Pain Assessment Tool 0-10   Pain Score 9   Pain Location/Orientation Location: Generalized   Restrictions/Precautions   Weight Bearing Precautions Per Order No   Other Precautions Pain; Fall Risk;Multiple lines;Agitated   Home Living   Type of [address] Two level;Bed/bath upstairs;Stairs to enter with rails  (4 EZEKIEL c HR)   Bathroom Shower/Tub Tub/shower unit   Bathroom Toilet Standard   Bathroom Accessibility [place]  (lift chair)   Additional Comments pt reports use of cane at baseline   Prior Function   Level of [place] Independent with ADLs and functional mobility; Needs assistance with IADLs   Lives With Spouse   Receives Help From Home health; Family   ADL Assistance Needs assistance   IADLs Needs assistance   Falls in the last 6 months 5 to 10   Vocational On disability   Comments (-) driving   General   Family/Caregiver Present No   Cognition   Overall Cognitive Status WFL   Arousal/Participation Uncooperative   Orientation Level Oriented X4   Following Commands Follows one step commands with increased time or repetition   Subjective   Subjective "That's not going to work   I know what works"   RLE Assessment   RLE Assessment X  (assessed functionally; DNT MMT d/t body habitus)   LLE Assessment   LLE Assessment X  (assessed functionally; DNT MMT d/t body habitus)   Bed Mobility   Supine to Sit 4  Minimal assistance   Additional items Assist x 1; Increased time required;Verbal cues;LE management   Sit to Supine 2  Maximal assistance   Additional items Assist x 2; Increased time required;Verbal cues;LE management   Transfers   Sit to Stand 4  Minimal assistance   Additional items Assist x 2; Increased time required;Verbal cues; Other  (bed elevated)   Stand to Sit 4  Minimal assistance   Additional items Assist x 2; Increased time required;Verbal cues; Bed elevated   Additional Comments SPC used   Ambulation/Elevation   Gait pattern Excessively slow; Short stride; Foward flexed;Decreased foot clearance; Wide NICOLLE; Improper Weight shift   Gait Assistance 4  Minimal assist   Additional items Assist x 1;Verbal cues   Assistive Device [facility]   Distance 60'   Balance   Static Sitting Poor +   Dynamic Sitting Poor +   Static Standing Fair   Dynamic Standing Fair -   Ambulatory Fair -   Endurance Deficit   Endurance Deficit Yes   Activity Tolerance   Activity Tolerance Patient limited by fatigue;Patient limited by pain;Treatment limited secondary to agitation   Nurse Made Aware JULIA Grimes   Assessment   Prognosis Guarded   Problem List Decreased strength;Decreased endurance; Impaired balance;Decreased mobility; Impaired judgement;Decreased safety awareness; Obesity;Pain   Assessment Patient is a 57 y o  male evaluated by Physical Therapy s/p admit to [address] [place] [address] on 2/8/2022 with admitting diagnosis of: Edema, Hypokalemia, Generalized edema, Chronic anticoagulation, Abnormal CT of the chest, Abdominal wall hematoma, Generalized weakness, Multiple falls, Acute on chronic diastolic (congestive) heart failure, and principal problem of: Acute on chronic diastolic (congestive) heart failure  PT was consulted to assess patient's functional mobility and discharge needs  Ordered are PT Evaluation and treatment with activity level of: up with assistance   Comorbidities affecting patient's physical performance at time of assessment include: a-fib, DM, neuropathy, HTN, obesity, CKD, cirrhosis of liver  Personal factors affecting the patient at time of IE include: lives in 2 story home, ambulating with assistive device, step(s) to enter home, inability to navigate community distances, history of fall(s), inability/difficulty performing IADLs and inability/difficulty performing ADLs  Please locate objective findings from PT assessment regarding body systems outlined above  Upon evaluation, pt requiring min-maxA x 2 to perform all functional mobility  While seated EOB, pt became very agitated with staff because pt encouraged to use safe technique to perform sit <> stand transfer rather than unsafe technique pt preferred  Attempted to educate patient on importance of using safe transfer techniques and the consequences of not using good body mechanics, however pt unable to demonstrate understanding and became more agitated with staff, yelling loudly  CNA assisted with deescalating situation, and pt eventually in agreement with participation  Seated rest break taken following 60' of gait training; no true LOB experienced  The patient's AM-PAC Basic Mobility Inpatient Short Form Raw Score is 13  A Raw score of less than or equal to 16 suggests the patient may benefit from discharge to post-acute rehabilitation services  Please also refer to the recommendation of the Physical Therapist for safe discharge planning  Co treatment with OT secondary to complex medical condition of pt, possible A of 2 required to achieve and maintain transitional movements, requiring the need of skilled therapeutic intervention of 2 therapists to achieve delivery of services  Pt will benefit from continued PT intervention during LOS to address current deficits, increase LOF, and facilitate safe d/c to next level of care when medically appropriate  Goals   Patient Goals to do it my way   Short Term Goal #1 Pt will participate in B LE strengthening exercises to facilitate improved functional mobility  Short Term Goal #2 Pt will perform all functional transfers and bed mobility mod(I) with good safety awareness  LTG Expiration Date 02/23/22   Long Term Goal #1 Pt will ambulate 200' mod(I) with SPC while maintaining good functional dynamic balance  Long Term Goal #2 Pt will ascend/descend 4 stairs with HR and SUP to reflect the ability to safely enter his home  Plan   Treatment/Interventions Functional transfer training;LE strengthening/ROM; Elevations; Therapeutic exercise; Endurance training;Bed mobility;Gait training   PT Frequency 3-5x/wk   Recommendation   PT Discharge Recommendation Post acute rehabilitation services   AM-PAC Basic Mobility Inpatient   Turning in Bed Without Bedrails 2   Lying on Back to Sitting on Edge of Flat Bed 2   Moving Bed to Chair 2   Standing Up From Chair 2   Walk in Room 3   Climb 3-5 Stairs 2   Basic Mobility Inpatient Raw Score 13   Basic Mobility Standardized Score 33 99   Highest Level Of Mobility   JH-HL Goal 4: Move to chair/commode   JH-HLM Highest Level of Mobility 7: Walk 25 feet or more   JH-HLM Goal Achieved Yes   End of Consult   Patient Position at End of Consult Supine; All needs within reach

## 2022-02-09 NOTE — OCCUPATIONAL THERAPY NOTE
Occupational Therapy Evaluation     Patient Name: Gorge Abdalla  AGQPI'O Date: 2/9/2022  Problem List  Principal Problem:    Acute on chronic diastolic (congestive) heart failure (HCC)  Active Problems:    Cirrhosis of liver with ascites (HCC)    Chronic anemia    Permanent atrial fibrillation (HCC)    Hyponatremia    CKD (chronic kidney disease)    Morbid (severe) obesity due to excess calories ([facility] )    Abdominal wall hematoma    RLL pneumonia    Past Medical History  Past Medical History:   Diagnosis Date    [facility]     Acute renal failure superimposed on stage 3b chronic kidney disease ([facility] ) 03/15/2021    Cardiac disease     Chronic combined systolic (congestive) and diastolic (congestive) heart failure (HCC)     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 ascites (HCC)     Class 3 severe obesity due to excess calories with serious comorbidity and body mass index (BMI) of 45 0 to 49 9 in adult ([facility] ) 10/17/2018    Diabetes mellitus ([facility] )     [facility]ilated cardiomyopathy ([facility]     History of echocardiogram 11/24/2014    EF 0 30 (30%), Likely mod LV systolic dysfunction  Likely RV dysfunction as well   History of Lexiscan MPI 02/19/2016    EF 0 43 (43%), no prior MI or ischemia   Hx of echocardiogram 04/28/2017    Normal EF, Normal LV systolic function  Mild concentric LV hypertrophy  Mild mitral and tricuspid regurg      Hx: recurrent pneumonia     Hypertension     Hypokalemia 09/04/2020    Long term (current) use of anticoagulants     Morbid (severe) obesity due to excess calories (HCC)     Neuropathy     Pleural effusion on right     Stage 3 chronic kidney disease [facility])      Past Surgical History  Past Surgical History:   Procedure Laterality Date    HERNIA REPAIR      IR CHEST TUBE CHECK/CHANGE/REPOSITION/UPSIZE  5/27/2021    IR CHEST TUBE PLACEMENT  5/22/2021    IR PELVIC ANGIOGRAM  12/4/2021    IR THORACENTESIS  8/25/2020    IR THORACENTESIS  2/23/2021    LUNG DECORTICATION Right 6/2/2021 Procedure: DECORTICATION LUNG;  Surgeon: Eleonora Villarreal MD;  [facility]: [facility];  Service: Thoracic    SPLENECTOMY, TOTAL      THORACOSCOPY VIDEO ASSISTED SURGERY (VATS) Right 6/2/2021    Procedure: THORACOSCOPY VIDEO ASSISTED SURGERY (VATS); Surgeon: Eleonora Villarreal MD;  [facility]: [facility];  Service: Thoracic    VASCULAR SURGERY Right     leg         02/09/22 1107   OT Last Visit   OT Visit Date 02/09/22   Note Type   Note type Evaluation   Restrictions/Precautions   Weight Bearing Precautions Per Order No   Other Precautions Cognitive;Multiple lines; Fall Risk;Pain   Pain Assessment   Pain Score 9   Pain Location/Orientation Location: Generalized   Home Living   Type of [address] [place] [address] Two level;Bed/bath upstairs;Stairs to enter with rails   Bathroom Shower/Tub Tub/shower unit   Bathroom Toilet Standard   Bathroom Accessibility Accessible   Home Equipment Cane   Additional Comments Pt has 4 EZEKIEL, utilizes cane PTA for mobility, reports wife assist with ADLS  Has lift chair at home that he sleeps in     Prior Function   Level of [place] Needs assistance with IADLs; Needs assistance with ADLs and functional mobility   Lives With Spouse   Receives Help From Family;Home health   ADL Assistance Needs assistance   IADLs Needs assistance   Falls in the last 6 months 5 to 10   Vocational On disability   Comments - drive, wife assists with ADLS and IADLS was recieveing home health care PTA   Psychosocial   Psychosocial (WDL) X   Patient Behaviors/Mood Irritable   Subjective   Subjective "Just do it my way!"   ADL   Where Assessed Edge of bed   LB Dressing Assistance 2  Maximal Assistance   LB Dressing Deficit Don/doff R sock; Don/doff L sock   Additional Comments Pt reports his wife assist with same at baseline   Bed Mobility   Supine to Sit 4  Minimal assistance   Additional items Assist x 1   Sit to Supine 2  Maximal assistance   Additional items Assist x 2   Additional Comments HOB elevated for supine to sit - sit to supine, pt sat from standing and laid in plank position at EOB then requesting " you need to swinging my legs up now!"   Transfers   Sit to Stand 4  Minimal assistance   Additional items Assist x 2   Stand to Sit 4  Minimal assistance   Additional items Assist x 2   Stand pivot 4  Minimal assistance   Additional items Assist x 2   Additional Comments SPC level  Pt initially hayley argumentative about sit to stand from EOB requesting for therapists to "pull up on my arms" increased time spent educating pt on the dangers of having anyone pull on his arms to assist to stand and instructing on safe transfer techniques  Pt becomes very agitated with same demonstrating decreased insight into overall safety   Increased emotional support and education provided to descelate and ultimately safely and successfully compelted sit to stand and funcitonal mobility with significant noted change in emotional state, brighter affect   Functional Mobility   Functional Mobility 4  Minimal assistance   Additional Comments ax1 SPC level ~50 ft   Additional items SPC   Balance   Static Sitting Poor +   Dynamic Sitting Poor +   Static Standing Fair   Dynamic Standing Fair -   Ambulatory Fair -   Activity Tolerance   Activity Tolerance Patient limited by fatigue   RUE Assessment   RUE Assessment WFL   LUE Assessment   LUE Assessment WFL   Hand Function   Gross Motor Coordination Functional   Fine Motor Coordination Functional   Sensation   Light Touch No apparent deficits   Sharp/Dull No apparent deficits   Cognition   Overall Cognitive Status Impaired   Arousal/Participation Alert   Attention Difficulty attending to directions   Orientation Level Oriented X4   Memory Decreased recall of recent events   Following Commands Follows one step commands with increased time or repetition   Comments Argumentative at times, decreased insight into safety awareness and overall health managment, increased time required for education and emotional support required throughout    Assessment   Limitation Decreased ADL status; Decreased UE strength;Decreased Safe judgement during ADL;Decreased cognition;Decreased endurance;Decreased self-care trans;Decreased high-level ADLs   Assessment Pt is a 57 y o  male seen for OT evaluation s/p admit to [facility] on 2/8/2022 w/ Acute on chronic diastolic (congestive) heart failure ([facility] )  Comorbidities affecting pt's functional performance at time of assessment include: a-fib, DM, neuropathy, HTN, cardiac disease, CHF, hypokalemia, PE, CKD, chirrhosis of liver  Personal factors affecting pt at time of IE include:steps to enter environment, difficulty performing ADLS, difficulty performing IADLS , limited insight into deficits, decreased initiation and engagement  and health management   Prior to admission, pt was requiring assist with ADLS and IADLS  Upon evaluation: Pt presents supine  Pt requires overall Min- MAx A, 2* the following deficits impacting occupational performance: weakness, decreased strength, decreased balance, decreased tolerance, impaired attention, impaired initiation, impaired sequencing, impaired problem solving, decreased safety awareness and increased pain  Pt resting in supine at end of session with all needs in reach, alarm on, all lines in place  Pt to benefit from continued skilled OT tx while in the hospital to address deficits as defined above and maximize level of functional independence w ADL's and functional mobility  Occupational Performance areas to address include: grooming, bathing/shower, toilet hygiene, dressing, health maintenance, functional mobility, community mobility and clothing management  The patient's raw score on the AM-PAC Daily Activity inpatient short form is 16, standardized score is 35 96, less than 39 4  Patients at this level are likely to benefit from discharge to post-acute rehabilitation services   Please refer to the recommendation of the Occupational Therapist for safe discharge planning  Goals   Patient Goals to go home   Short Term Goal #2 Pt will increase activity tolerance to G for 30 min txment sessions   Short Term Goal  Pt will complete UB TE to increase strength in order to improve overall functional abilities    Long Term Goal #1 Pt will complete UB/LB self cares with Min A assist with AE PRN  Long Term Goal #2 Pt will be MIN A assist with toileting with AE PRN   Long Term Goal Pt will complete transfers/functional mobility at MOD I  level with DME PRN and good safety awareness  Plan   Treatment Interventions ADL retraining;Functional transfer training;UE strengthening/ROM; Endurance training;Patient/family training; Compensatory technique education; Energy conservation; Activityengagement   Goal Expiration Date 02/23/22   OT Frequency 3-5x/wk   Recommendation   OT Discharge Recommendation Post acute rehabilitation services   AM-PAC Daily Activity Inpatient   Lower Body Dressing 2   Bathing 2   Toileting 2   Upper Body Dressing 3   Grooming 3   Eating 4   Daily Activity Raw Score 16   Daily Activity Standardized Score (Calc for Raw Score >=11) 35 96   AM-PAC Applied Cognition Inpatient   Following a Speech/Presentation 3   Understanding Ordinary Conversation 4   Taking Medications 4   Remembering Where Things Are Placed or Put Away 4   Remembering List of 4-5 Errands 3   Taking Care of Complicated Tasks 2   Applied Cognition Raw Score 20   Applied Cognition Standardized Score 41 76

## 2022-02-09 NOTE — PROGRESS NOTES
Progress Note - General Surgery   Solomon Buck 57 y o  male MRN: [id number]  Unit/Bed#: 403-01 Encounter: [id number]    Assessment:    Abdominal wall hematoma with large rectus sheath hematoma status post recurrent fall, patient with slight improvement pain across the abdomen  Patient has had known hematoma though on recent CT scan slightly enlarged in comparison from CT imaging CT AP December 4, 2021, at that time was a 15 4 cm subcutaneous mixed density hematoma in the left lower abdomen, repeat CT scan done yesterday shows 14 by 11 7 cm left lower abdominal wall hematoma mildly increased from prior exam       Incidental finding on CT of the neck showed mediastinal/left neck base lymphadenopathy and recommended for follow-up CT of the neck in 3 months  Low suspicion for abdominal compartment syndrome  Abdominal girth is too large for abdominal binder  Continue to hold warfarin  Patient received 2 units packed red blood cells  Hgb 7 6 (after 2 u PRBCs) was 6 6, 6 3  Admitted with acute on chronic diastolic congestive heart failure  Chronic atrial fibrillation, warfarin on hold  INR yesterday subtherapeutic 1 6  Right lower lobe pneumonia suspected, procalcitonin level x2 positive  Anasarca, prior discharge weight was about 340 lb, now 380 lb  Patient has been on chronic diuretic regimen at home with Lasix and p r n  Metallic zone  Cirrhosis of liver with ascites, not thought to be amenable to IR paracentesis drainage with small amount of ascites behind colon  Chronic kidney disease stage III, creatinine today 1 87  History of chronic alcohol use  Severe morbid obesity, BMI 51 54    Plan:  Conservative management, no indication for any surgical intervention at this time  Abdominal girth too large for binder  Continue to hold warfarin, monitor INR    Apply ice bag p r n  comfort across mid abdomen  Analgesics as ordered p r n , reviewed  Continue to monitor hemoglobin, transfuse for hemoglobin less than 7 5   Medical management per the attending hospitalist providers  If the patient becomes hemodynamically unstable or hemoglobin drops, then the patient should have CTA of the abdomen and pelvis performed  Follow-up CT of the neck in 3 months, primary care physician Dr Mesfin Mata  Subjective/Objective      Chief Complaint:  Patient still with discomfort across his abdomen  He has been nonambulatory  Subjective:  58-year-old morbidly obese white male evaluated by surgery because of development of slightly increased size rectus sheath hematoma, he had admission in December of last year with known hematoma formation then  He was admitted to the hospital here with acute exacerbation congestive heart failure and increased edema  He was found to have right lower lobe pneumonia upon admission  General surgery has been following the patient for development of slightly increased size of abdominal hematoma  The patient is currently not on warfarin, INR yesterday 1 6  Lastly, the patient had a drop in hemoglobin to 6 3 yesterday, the patient received 2 units of packed red blood cells and repeat hemoglobin today showed improvement to 7 6  Patient denies any fever or chills  No cough or chest pain  He has been nonambulatory  Admits to passing gas, had a bowel movement earlier today      Scheduled Meds:  Current Facility-Administered Medications   Medication Dose Route Frequency Provider Last Rate    acetaminophen  650 mg Oral Q6H PRN Ellie Ricardo PA-C      cefTRIAXone  1,000 mg Intravenous Q24H Jose Martin MD      furosemide  10 mg/hr Intravenous Continuous Jose Martin MD 10 mg/hr (02/08/22 1640)    influenza vaccine  0 5 mL Intramuscular Prior to discharge Dilcia Pierre MD      metoprolol succinate  25 mg Oral BID Jose Martin MD      oxyCODONE  10 mg Oral Q4H PRN Ellie Ricardo PA-C      oxyCODONE  5 mg Oral Q4H PRN Ellie Ricardo PA-C      potassium chloride  40 mEq Oral BID JAVAD Miller senna-docusate sodium  2 tablet Oral BID Jose Champagne MD       Continuous Infusions:furosemide, 10 mg/hr, Last Rate: 10 mg/hr (02/08/22 1640)      PRN Meds:   acetaminophen    influenza vaccine    oxyCODONE    oxyCODONE      Objective:     Blood pressure 97/64, pulse 99, temperature 97 9 °F (36 6 °C), resp  rate 16, height 6' (1 829 m), weight (!) 172 kg (380 lb), SpO2 100 %  ,Body mass index is 51 54 kg/m²  I/O       02/07 0701 02/08 0700 02/08 0701 02/09 0700 02/09 0701  02/10 0700    P  O   240 480    Blood   350    Total Intake(mL/kg)  240 (1 4) 830 (4 8)    Urine (mL/kg/hr)  950     Total Output  950     Net  -710 +830           Unmeasured Urine Occurrence  1 x 1 x    Unmeasured Stool Occurrence   1 x            Invasive Devices  Report    Peripheral Intravenous Line            Peripheral IV 02/08/22 Right Antecubital 1 day    Peripheral IV 02/08/22 Dorsal (posterior); Right Hand <1 day                Physical Exam:     General body habitus is significantly obese  He is ill-appearing  No respiratory distress  Skin poor turgor, brawny discoloration and hemosiderin deposition of bilateral lower extremities to the both knees, patient has thickened marbled and tense skin of both lower extremities, no palpable DP or PT pulses  Significant anasarca with 3+ pitting edema to the pretibial areas bilaterally particularly lower extremities  ENT oral mucosa pink and moist   Neck without carotid bruit, short neck stature  Chest symmetric  Heart rhythm is irregular, irregular  Heart sounds are distant, no definite murmur heard  Lungs no rales or rhonchi, good inspiratory effort  Abdomen very large abdominal wide girth  Bowel sounds are heard, the abdomen is tense with distension and suspected ascites  Tenderness across the mid abdomen, no guarding or rebound  Positive Carnett's sign  No bruising on the abdominal wall    Very difficult to palpate any abdominal wall hernias in this massive abdomen, the patient has a large abdominal pannus which protrudes over the genitalia  No particular increased skin warmth or suspicion of abdominal wall cellulitis  Neurological exam finds the patient fully oriented  No obvious coordination problems  Equal  strength enhanced  Ambulation was not observed  Lab, Imaging and other studies:  I have personally reviewed pertinent lab results  , CBC:   Lab Results   Component Value Date    WBC 5 50 02/09/2022    HGB 7 6 (L) 02/09/2022    HCT 24 5 (L) 02/09/2022    MCV 90 02/09/2022     02/09/2022    MCH 27 7 02/09/2022    MCHC 30 8 (L) 02/09/2022    RDW 17 7 (H) 02/09/2022    MPV 10 8 02/09/2022    NRBC 0 02/09/2022   , CMP:   Lab Results   Component Value Date    SODIUM 129 (L) 02/09/2022    K 3 2 (L) 02/09/2022    CL 90 (L) 02/09/2022    CO2 35 (H) 02/09/2022    BUN 80 (H) 02/09/2022    CREATININE 1 87 (H) 02/09/2022    CALCIUM 8 0 (L) 02/09/2022    AST 22 02/09/2022    ALT 11 (L) 02/09/2022    ALKPHOS 143 (H) 02/09/2022    EGFR 37 02/09/2022   , Coagulation: No results found for: PT, INR, APTT     CT CHEST, ABDOMEN AND PELVIS WITH IV CONTRAST 2/8/2022     INDICATION:   TRAUMA      COMPARISON:  CT 12/4/2021 and 9/13/2021     14 x 11 7 cm dense collection noted within the left lower abdominal wall, which is mildly increased from prior  There is no metabolic material within the left abdominal wall vasculature     Venous collaterals are seen throughout the lower abdominal wall as well as the right inguinal region and right proximal thigh     IMPRESSION:     1   Left abdominal wall hematoma is mildly increased from December 2021  2  Mediastinal/left neck base lymphadenopathy, consider follow-up neck and chest CT in 3 months to evaluate for persistence  3  Right lower lobe airspace opacities, concerning for pneumonia  4  Anasarca  5  Small right pleural effusion  6  Cirrhotic morphology with ascites  7    Additional findings as above        VTE Pharmacologic Prophylaxis: Reason for no pharmacologic prophylaxis Anemia, hematoma  VTE Mechanical Prophylaxis:  Currently not applied to lower extremities, calf circumference likely too large for SCD compression boots      Avril Uriarte PA-C

## 2022-02-09 NOTE — CASE MANAGEMENT
Case Management Assessment & Discharge Planning Note    Patient name Dolores Link  Location /512-34 MRN [id number]  : 1960 Date 2022       Current Admission Date: 2022  Current Admission Diagnosis:Acute on chronic diastolic (congestive) heart failure [facility])   Patient Active Problem List    Diagnosis Date Noted    Abnormal CT of the chest 2022    RLL pneumonia 2022    Acute blood loss anemia 2021    Abdominal wall hematoma 2021    Long term (current) use of anticoagulants 10/14/2021    Hyperphosphatemia 2021    Elevated alkaline phosphatase level 2021    Neutropenia ([facility] ) 2021    Pancytopenia ([facility] ) 2021    NSVT (nonsustained ventricular tachycardia) ([facility] ) 2021    Hypoalbuminemia 2021    Chronic right-sided congestive heart failure ([facility] ) 2021    Severe sepsis with septic shock (HCC) 2021    Panniculitis 2021    Iron deficiency 2021    Right wrist pain 2021    Morbid (severe) obesity due to excess calories (HCC)     Smoking     Hypotension 2021    Acute kidney injury superimposed on chronic kidney disease ([facility] ) 03/15/2021    Hypertensive nephrosclerosis 03/15/2021    CKD (chronic kidney disease) 03/15/2021    Hypercalcemia 03/10/2021    Anemia 2021    Constipation 2021    Pulmonary hypertension ([facility] ) 2021    Vitamin D deficiency 2021    Atelectasis 2021    Hyponatremia 2021    Lactic acidosis 2021    Permanent atrial fibrillation ([facility] ) 2021    Diverticulosis of colon 2021    Lesion of spleen 2021    Chronic diastolic CHF (congestive heart failure) ([facility] ) 2021    Tobacco use 2021    Other cirrhosis of liver ([facility] ) 2020    Encounter for screening for other viral diseases  2020    Hypokalemia 2020    Thrombocytopenia ([facility] ) 2020    Pleural effusion, right 2020    SOB (shortness of breath) 08/21/2020    Cirrhosis of liver with ascites ([facility] ) 08/21/2020    Tobacco abuse 08/21/2020    Chronic anemia 08/21/2020    STEFF (obstructive sleep apnea)     Hypertensive heart and chronic kidney disease with heart failure and stage 1 through stage 4 chronic kidney disease, or chronic kidney disease ([facility] ) 10/16/2019    Chronic venous insufficiency 05/01/2019    Varicose veins of both lower extremities with inflammation 05/01/2019    Lymphedema 10/24/2018    Acute on chronic diastolic (congestive) heart failure ([facility] ) 10/17/2018      LOS (days): 1  Geometric Mean LOS (GMLOS) (days): 3 80  Days to GMLOS:3     OBJECTIVE:    Risk of Unplanned Readmission Score: 40         Current admission status: Inpatient       Preferred Pharmacy:   [facility]/pharmacy #[id number]- [address], [address] [place] [id number]  Phone: [phone] Fax: [phone]    Primary Care Provider: Jessica Connolly DO    Primary Insurance: [facility]  Secondary Insurance:     ASSESSMENT:  Ananda Salgado Ii [address] Representative - Spouse   Primary Phone: [phone] (Home)  Mobile Phone: [phone]               Advance Directives  Does patient have a [address]?: No  Was patient offered paperwork?: Yes (declines)  Does patient currently have a Health Care decision maker?: Yes, please see Health Care Proxy section  Does patient have Advance Directives?: No  Was patient offered paperwork?: Yes (declines)         Readmission Root Cause  30 Day Readmission: No    Patient Information  Admitted from[de-identified] Home  Mental Status: Alert  During Assessment patient was accompanied by: Not accompanied during assessment  Assessment information provided by[de-identified] Patient  Primary Caregiver: Self  Support Systems: Spouse/significant other  [place] of Residence: [facility]  do you live in?: [address] entry access options  Select all that apply  Gigi Justice Stairs  Number of steps to enter home : 4  Type of Current Residence: [address] [place] home  Upon entering residence, is there a bedroom on the main floor (no further steps)?: No (sleeps in a power recliner on the 1st floor)  A bedroom is located on the following floor levels of residence (select all that apply):: 2nd Floor  Upon entering residence, is there a bathroom on the main floor (no further steps)?: No (has a BSC on the 1st floor)  Indicate which floors of current residence have a bathroom (select all the apply):: 2nd Floor  Number of steps to 2nd floor from main floor: One Flight  In the last 12 months, was there a time when you were not able to pay the mortgage or rent on time?: No  In the last 12 months, was there a time when you did not have a steady place to sleep or slept in a shelter (including now)?: No  Homeless/housing insecurity resource given?: N/A  Living Arrangements: Lives w/ Spouse/significant other    Activities of Daily Living Prior to Admission  Functional Status: Independent  Completes ADLs independently?: Yes  Ambulates independently?: Yes  Does patient use assisted devices?: Yes  Assisted Devices (DME) used: Quad Cane,Bedside Commode  Does patient currently own DME?: Yes  What DME does the patient currently own?: Bedside Commode,Quad Cane  Does patient have a history of Outpatient Therapy (PT/OT)?: No  Does the patient have a history of Short-Term Rehab?: Yes (hx STR in December 2021 at [facility]  Does patient have a history of HHC?: Yes (pt unsure of the hhc agency)  Does patient currently have [address]?: No         Patient Information Continued  Does patient have prescription coverage?: Yes  Within the past 12 months, you worried that your food would run out before you got the money to buy more : Never true  Within the past 12 months, the food you bought just didnt last and you didnt have money to get more : Never true  Does patient have a history of substance abuse?: No  Does patient have a history of Mental Health Diagnosis?: No  Hx of Alcohol Abuse(Cirrhosis of the liver)    PHQ 2/9 Screening   Reviewed PHQ 2/9 Depression Screening Score?: No    Means of Transportation  Means of Transport to Appts[de-identified] Family transport (wife has been doing the driving)  In the past 12 months, has lack of transportation kept you from medical appointments or from getting medications?: No  In the past 12 months, has lack of transportation kept you from meetings, work, or from getting things needed for daily living?: No  Was application for public transport provided?: N/A        DISCHARGE DETAILS:    Discharge planning discussed with[de-identified] patient        CM contacted family/caregiver?: No- see comments (pt is alert, independent and declines)  Were Treatment Team discharge recommendations reviewed with patient/caregiver?: Yes  Did patient/caregiver verbalize understanding of patient care needs?: Yes  Were patient/caregiver advised of the risks associated with not following Treatment Team discharge recommendations?: Yes    Discharge Destination Plan[de-identified] Home (possible VNA on dc)  Transport at Discharge : Family (wife)   Plans at this time are home on dc with OP follow up and possible need for VNA on dc  Cm will follow and assist in dc planning

## 2022-02-09 NOTE — PLAN OF CARE
Problem: Potential for Falls  Goal: Patient will remain free of falls  Description: INTERVENTIONS:  - Educate patient/family on patient safety including physical limitations  - Instruct patient to call for assistance with activity   - Consult OT/PT to assist with strengthening/mobility   - Keep Call bell within reach  - Keep bed low and locked with side rails adjusted as appropriate  - Keep care items and personal belongings within reach  - Initiate and maintain comfort rounds  - Make Fall Risk Sign visible to staff  - Offer Toileting every 2 Hours, in advance of need  - Initiate/Maintain fall alarm  - Obtain necessary fall risk management equipment: non-skid footwear   - Apply yellow socks and bracelet for high fall risk patients  - Consider moving patient to room near nurses station  Outcome: Progressing     Problem: MOBILITY - ADULT  Goal: Maintain or return to baseline ADL function  Description: INTERVENTIONS:  -  Assess patient's ability to carry out ADLs; assess patient's baseline for ADL function and identify physical deficits which impact ability to perform ADLs (bathing, care of mouth/teeth, toileting, grooming, dressing, etc )  - Assess/evaluate cause of self-care deficits   - Assess range of motion  - Assess patient's mobility; develop plan if impaired  - Assess patient's need for assistive devices and provide as appropriate  - Encourage maximum independence but intervene and supervise when necessary  - Involve family in performance of ADLs  - Assess for home care needs following discharge   - Consider OT consult to assist with ADL evaluation and planning for discharge  - Provide patient education as appropriate  Outcome: Progressing  Goal: Maintains/Returns to pre admission functional level  Description: INTERVENTIONS:  - Perform BMAT or MOVE assessment daily    - Set and communicate daily mobility goal to care team and patient/family/caregiver     - Collaborate with rehabilitation services on mobility goals if consulted  - Perform Range of Motion 2-3 times a day  - Reposition patient every 2 hours    - Dangle patient 3 times a day  - Stand patient 3 times a day  - Ambulate patient 3 times a day  - Out of bed to chair 3 times a day   - Out of bed for meals 3 times a day  - Out of bed for toileting  - Record patient progress and toleration of activity level   Outcome: Progressing     Problem: PAIN - ADULT  Goal: Verbalizes/displays adequate comfort level or baseline comfort level  Description: Interventions:  - Encourage patient to monitor pain and request assistance  - Assess pain using appropriate pain scale  - Administer analgesics based on type and severity of pain and evaluate response  - Implement non-pharmacological measures as appropriate and evaluate response  - Consider cultural and social influences on pain and pain management  - Notify physician/advanced practitioner if interventions unsuccessful or patient reports new pain  Outcome: Progressing     Problem: INFECTION - ADULT  Goal: Absence or prevention of progression during hospitalization  Description: INTERVENTIONS:  - Assess and monitor for signs and symptoms of infection  - Monitor lab/diagnostic results  - Monitor all insertion sites, i e  indwelling lines, tubes, and drains  - Monitor endotracheal if appropriate and nasal secretions for changes in amount and color  - Barnes appropriate cooling/warming therapies per order  - Administer medications as ordered  - Instruct and encourage patient and family to use good hand hygiene technique  - Identify and instruct in appropriate isolation precautions for identified infection/condition  Outcome: Progressing  Goal: Absence of fever/infection during neutropenic period  Description: INTERVENTIONS:  - Monitor WBC    Outcome: Progressing     Problem: SAFETY ADULT  Goal: Patient will remain free of falls  Description: INTERVENTIONS:  - Educate patient/family on patient safety including physical limitations  - Instruct patient to call for assistance with activity   - Consult OT/PT to assist with strengthening/mobility   - Keep Call bell within reach  - Keep bed low and locked with side rails adjusted as appropriate  - Keep care items and personal belongings within reach  - Initiate and maintain comfort rounds  - Make Fall Risk Sign visible to staff  - Offer Toileting every 2 Hours, in advance of need  - Initiate/Maintain fall alarm  - Obtain necessary fall risk management equipment: non-skid footwear  - Apply yellow socks and bracelet for high fall risk patients  - Consider moving patient to room near nurses station  Outcome: Progressing  Goal: Maintain or return to baseline ADL function  Description: INTERVENTIONS:  -  Assess patient's ability to carry out ADLs; assess patient's baseline for ADL function and identify physical deficits which impact ability to perform ADLs (bathing, care of mouth/teeth, toileting, grooming, dressing, etc )  - Assess/evaluate cause of self-care deficits   - Assess range of motion  - Assess patient's mobility; develop plan if impaired  - Assess patient's need for assistive devices and provide as appropriate  - Encourage maximum independence but intervene and supervise when necessary  - Involve family in performance of ADLs  - Assess for home care needs following discharge   - Consider OT consult to assist with ADL evaluation and planning for discharge  - Provide patient education as appropriate  Outcome: Progressing  Goal: Maintains/Returns to pre admission functional level  Description: INTERVENTIONS:  - Perform BMAT or MOVE assessment daily    - Set and communicate daily mobility goal to care team and patient/family/caregiver  - Collaborate with rehabilitation services on mobility goals if consulted  - Perform Range of Motion 3 times a day  - Reposition patient every 2 hours    - Dangle patient 3 times a day  - Stand patient 3 times a day  - Ambulate patient 3 times a day  - Out of bed to chair 3 times a day   - Out of bed for meals 3 times a day  - Out of bed for toileting  - Record patient progress and toleration of activity level   Outcome: Progressing     Problem: DISCHARGE PLANNING  Goal: Discharge to home or other facility with appropriate resources  Description: INTERVENTIONS:  - Identify barriers to discharge w/patient and caregiver  - Arrange for needed discharge resources and transportation as appropriate  - Identify discharge learning needs (meds, wound care, etc )  - Arrange for interpretive services to assist at discharge as needed  - Refer to Case Management Department for coordinating discharge planning if the patient needs post-hospital services based on physician/advanced practitioner order or complex needs related to functional status, cognitive ability, or social support system  Outcome: Progressing     Problem: Knowledge Deficit  Goal: Patient/family/caregiver demonstrates understanding of disease process, treatment plan, medications, and discharge instructions  Description: Complete learning assessment and assess knowledge base    Interventions:  - Provide teaching at level of understanding  - Provide teaching via preferred learning methods  Outcome: Progressing     Problem: Prexisting or High Potential for Compromised Skin Integrity  Goal: Skin integrity is maintained or improved  Description: INTERVENTIONS:  - Identify patients at risk for skin breakdown  - Assess and monitor skin integrity  - Assess and monitor nutrition and hydration status  - Monitor labs   - Assess for incontinence   - Turn and reposition patient  - Assist with mobility/ambulation  - Relieve pressure over bony prominences  - Avoid friction and shearing  - Provide appropriate hygiene as needed including keeping skin clean and dry  - Evaluate need for skin moisturizer/barrier cream  - Collaborate with interdisciplinary team   - Patient/family teaching  - Consider wound care consult Outcome: Progressing

## 2022-02-09 NOTE — PLAN OF CARE
Problem: PHYSICAL THERAPY ADULT  Goal: Performs mobility at highest level of function for planned discharge setting  See evaluation for individualized goals  Description: Treatment/Interventions: Functional transfer training,LE strengthening/ROM,Elevations,Therapeutic exercise,Endurance training,Bed mobility,Gait training          See flowsheet documentation for full assessment, interventions and recommendations  Note: Prognosis: Guarded  Problem List: Decreased strength,Decreased endurance,Impaired balance,Decreased mobility,Impaired judgement,Decreased safety awareness,Obesity,Pain  Assessment: Patient is a 57 y o  male evaluated by Physical Therapy s/p admit to [address] [place] [address],[address] on 2/8/2022 with admitting diagnosis of: Edema, Hypokalemia, Generalized edema, Chronic anticoagulation, Abnormal CT of the chest, Abdominal wall hematoma, Generalized weakness, Multiple falls, Acute on chronic diastolic (congestive) heart failure, and principal problem of: Acute on chronic diastolic (congestive) heart failure  PT was consulted to assess patient's functional mobility and discharge needs  Ordered are PT Evaluation and treatment with activity level of: up with assistance  Comorbidities affecting patient's physical performance at time of assessment include: a-fib, DM, neuropathy, HTN, obesity, CKD, cirrhosis of liver  Personal factors affecting the patient at time of IE include: lives in 2 story home, ambulating with assistive device, step(s) to enter home, inability to navigate community distances, history of fall(s), inability/difficulty performing IADLs and inability/difficulty performing ADLs  Please locate objective findings from PT assessment regarding body systems outlined above  Upon evaluation, pt requiring min-maxA x 2 to perform all functional mobility   While seated EOB, pt became very agitated with staff because pt encouraged to use safe technique to perform sit <> stand transfer rather than unsafe technique pt preferred  Attempted to educate patient on importance of using safe transfer techniques and the consequences of not using good body mechanics, however pt unable to demonstrate understanding and became more agitated with staff, yelling loudly  CNA assisted with deescalating situation, and pt eventually in agreement with participation  Seated rest break taken following 60' of gait training; no true LOB experienced  The patient's AM-PAC Basic Mobility Inpatient Short Form Raw Score is 13  A Raw score of less than or equal to 16 suggests the patient may benefit from discharge to post-acute rehabilitation services  Please also refer to the recommendation of the Physical Therapist for safe discharge planning  Co treatment with OT secondary to complex medical condition of pt, possible A of 2 required to achieve and maintain transitional movements, requiring the need of skilled therapeutic intervention of 2 therapists to achieve delivery of services  Pt will benefit from continued PT intervention during LOS to address current deficits, increase LOF, and facilitate safe d/c to next level of care when medically appropriate  PT Discharge Recommendation: Post acute rehabilitation services          See flowsheet documentation for full assessment

## 2022-02-09 NOTE — PLAN OF CARE
Problem: OCCUPATIONAL THERAPY ADULT  Goal: Performs self-care activities at highest level of function for planned discharge setting  See evaluation for individualized goals  Description: Treatment Interventions: ADL retraining,Functional transfer training,UE strengthening/ROM,Endurance training,Patient/family training,Compensatory technique education,Energy conservation,Activityengagement          See flowsheet documentation for full assessment, interventions and recommendations  Note: Limitation: Decreased ADL status,Decreased UE strength,Decreased Safe judgement during ADL,Decreased cognition,Decreased endurance,Decreased self-care trans,Decreased high-level ADLs     Assessment: Pt is a 57 y o  male seen for OT evaluation s/p admit to [facility] on 2/8/2022 w/ Acute on chronic diastolic (congestive) heart failure ([facility] )  Comorbidities affecting pt's functional performance at time of assessment include: a-fib, DM, neuropathy, HTN, cardiac disease, CHF, hypokalemia, PE, CKD, chirrhosis of liver  Personal factors affecting pt at time of IE include:steps to enter environment, difficulty performing ADLS, difficulty performing IADLS , limited insight into deficits, decreased initiation and engagement  and health management   Prior to admission, pt was requiring assist with ADLS and IADLS  Upon evaluation: Pt presents supine  Pt requires overall Min- MAx A, 2* the following deficits impacting occupational performance: weakness, decreased strength, decreased balance, decreased tolerance, impaired attention, impaired initiation, impaired sequencing, impaired problem solving, decreased safety awareness and increased pain  Pt resting in supine at end of session with all needs in reach, alarm on, all lines in place  Pt to benefit from continued skilled OT tx while in the hospital to address deficits as defined above and maximize level of functional independence w ADL's and functional mobility   Occupational Performance areas to address include: grooming, bathing/shower, toilet hygiene, dressing, health maintenance, functional mobility, community mobility and clothing management  The patient's raw score on the AM-PAC Daily Activity inpatient short form is 16, standardized score is 35 96, less than 39 4  Patients at this level are likely to benefit from discharge to post-acute rehabilitation services  Please refer to the recommendation of the Occupational Therapist for safe discharge planning         OT Discharge Recommendation: Post acute rehabilitation services

## 2022-02-09 NOTE — PROGRESS NOTES
[address] [facility] [address]  Progress Note - Dorjudith Cos 1960, 57 y o  male MRN: [id number]  Unit/Bed#: 403-01 Encounter: [id number]  Primary Care Provider: Alina Scott DO   Date and time admitted to hospital: 2/8/2022 12:45 PM    * Acute on chronic diastolic (congestive) heart failure [facility])  Assessment & Plan  Wt Readings from Last 3 Encounters:   02/08/22 (!) 172 kg (380 lb)   12/29/21 (!) 152 kg (336 lb)   12/14/21 (!) 152 kg (335 lb 1 6 oz)     Prior discharge weight is closer to 340lbs, approximately 30-40 lbs up from dry weight  Chronic diuretic regimen is lasix 120mg BID with PRN metalozone  Echo 2/21 EF 60%, PA pressure 52 mm Hg, no significant valvulopathy  Received 40mg IV lasix on admission then transitioned to lasix GTT 10mg/hr on 2/8  Cardiology input appreciated   Low salt diet with 1800cc fluid restriction      Abdominal wall hematoma  Assessment & Plan  Slightly increased since December  However given drop in hemoglobin and recent trauma will hold warfarin   Continue to check H&H q8 did receive 2 transfusions thus far  Surgical consult appreciated, no intervention needed at this time, abdominal binder ordered, pain meds PRN    Chronic anemia  Assessment & Plan  Previous baseline hemoglobin is 7-8  Check H&H q8  Transfuse for hgb < 7    Cirrhosis of liver with ascites (HCC)  Assessment & Plan  Due a history of alcohol use  Now with ascites  Consulted IR for paracentesis and send labs   GI consult appreciated, recommend outpatient EGD   MELD 24 (INR is in the setting of coumadin use however)    CKD (chronic kidney disease)  Assessment & Plan  Lab Results   Component Value Date    EGFR 37 02/09/2022    EGFR 36 02/08/2022    EGFR 26 12/13/2021    CREATININE 1 87 (H) 02/09/2022    CREATININE 1 91 (H) 02/08/2022    CREATININE 2 51 (H) 12/13/2021     Likely stage 3B  Renal function has fluctuated over the past 3-4 months but it appears his baseline creatinine is around 2  Monitor renal function closely with diuresis and replete electrolytes  Currently creatinine is downtrending     RLL pneumonia  Assessment & Plan  CT chest showing concern for RLL pneumonia  procalcitonin x 2 positive at 0 5 however could be elevated due to CKD  Urinary antigens negative, discontinue azithromycin  Has been on ceftriaxone 2 days, continue for now and continue to trend  WBC normal, afebrile  Check sputum culture      Morbid (severe) obesity due to excess calories (HCC)  Assessment & Plan  BMI 51  Encourage lifestyle modifications  Currently being diuresed        Hyponatremia  Assessment & Plan  129  Likely hypervolemic hyponatremia due to CHF exacerbation  Monitor Na level in setting of diuresis     Permanent atrial fibrillation (HCC)  Assessment & Plan  Rate controlled on toprol XL   Hold coumadin due to anemia and abdominal wall hematoma   INR on  subtherapeutic at 1 6        VTE Pharmacologic Prophylaxis:   High Risk (Score >/= 5) - Pharmacological DVT Prophylaxis Contraindicated  Sequential Compression Devices Ordered  Patient Centered Rounds: I performed bedside rounds with nursing staff today  Discussions with Specialists or Other Care Team Provider: case management, surgery    Education and Discussions with Family / Patient: Patient declined call to   Time Spent for Care: 30 minutes  More than 50% of total time spent on counseling and coordination of care as described above  Current Length of Stay: 1 day(s)  Current Patient Status: Inpatient   Certification Statement: The patient will continue to require additional inpatient hospital stay due to IV lasix drip, serial hgb for transfusions  Discharge Plan: Anticipate discharge in >72 hrs to rehab facility  Code Status: Level 1 - Full Code    Subjective:   Patient reports 9/10 pain due to feeling like his skin is being stretched  Danita Healy arguing with physical therapy today       Objective:     Vitals:   Temp (24hrs), Av 1 °F (36 7 °C), Min:97 4 °F (36 3 °C), Max:98 6 °F (37 °C)    Temp:  [97 4 °F (36 3 °C)-98 6 °F (37 °C)] 97 9 °F (36 6 °C)  HR:  [] 99  Resp:  [16-20] 16  BP: ()/(55-87) 97/64  SpO2:  [94 %-100 %] 100 %  Body mass index is 51 54 kg/m²  Input and Output Summary (last 24 hours): Intake/Output Summary (Last 24 hours) at 2/9/2022 1412  Last data filed at 2/9/2022 1404  Gross per 24 hour   Intake 1070 ml   Output 950 ml   Net 120 ml       Physical Exam:   Physical Exam  Vitals and nursing note reviewed  Constitutional:       General: He is not in acute distress  Appearance: He is obese  He is ill-appearing  HENT:      Head: Normocephalic and atraumatic  Cardiovascular:      Rate and Rhythm: Normal rate  Rhythm irregular  Pulses: Normal pulses  Heart sounds: Normal heart sounds  No murmur heard  No gallop  Pulmonary:      Effort: Pulmonary effort is normal       Breath sounds: Normal breath sounds  No wheezing, rhonchi or rales  Abdominal:      General: Bowel sounds are normal  There is distension  Tenderness: There is no abdominal tenderness  There is no guarding or rebound  Musculoskeletal:      Right lower leg: Edema present  Left lower leg: Edema present  Skin:     General: Skin is warm and dry  Neurological:      General: No focal deficit present  Mental Status: He is alert and oriented to person, place, and time  Psychiatric:         Mood and Affect: Mood normal          Behavior: Behavior normal           Additional Data:     Labs:  Results from last 7 days   Lab Units 02/09/22  0913 02/09/22  0520 02/09/22  0520   WBC Thousand/uL  --   --  5 50   HEMOGLOBIN g/dL 7 6*   < > 6 6*   HEMATOCRIT % 24 5*   < > 21 4*   PLATELETS Thousands/uL  --   --  178   NEUTROS PCT %  --   --  76*   LYMPHS PCT %  --   --  7*   MONOS PCT %  --   --  10   EOS PCT %  --   --  5    < > = values in this interval not displayed       Results from last 7 days   Lab Units 02/09/22  0520 SODIUM mmol/L 129*   POTASSIUM mmol/L 3 2*   CHLORIDE mmol/L 90*   CO2 mmol/L 35*   BUN mg/dL 80*   CREATININE mg/dL 1 87*   ANION GAP mmol/L 4   CALCIUM mg/dL 8 0*   ALBUMIN g/dL 1 9*   TOTAL BILIRUBIN mg/dL 0 82   ALK PHOS U/L 143*   ALT U/L 11*   AST U/L 22   GLUCOSE RANDOM mg/dL 141*     Results from last 7 days   Lab Units 02/08/22  1256   INR  1 63*             Results from last 7 days   Lab Units 02/09/22  0520 02/08/22  1732   PROCALCITONIN ng/ml 0 55* 0 54*       Lines/Drains:  Invasive Devices  Report    Peripheral Intravenous Line            Peripheral IV 02/08/22 Right Antecubital 1 day    Peripheral IV 02/08/22 Dorsal (posterior); Right Hand <1 day                  Telemetry:  Telemetry Orders (From admission, onward)             24 Hour Telemetry Monitoring  Continuous x 24 Hours (Telem)        Expiring   References:    Telemetry Guidelines   Question:  Reason for 24 Hour Telemetry  Answer:  Metabolic/Electrolyte Disturbance with High Probability of Dysrhythmia (K level <3 or >6, or KCL infusion >=10mEq/hr)                 Telemetry Reviewed: Atrial fibrillation  HR averaging 76  Indication for Continued Telemetry Use: Acute CHF on >200 mg lasix/day or equivalent dose or with new reduced EF                Imaging: Reviewed radiology reports from this admission including: chest CT scan and abdominal/pelvic CT    Recent Cultures (last 7 days):   Results from last 7 days   Lab Units 02/08/22  1732   LEGIONELLA URINARY ANTIGEN  Negative       Last 24 Hours Medication List:   Current Facility-Administered Medications   Medication Dose Route Frequency Provider Last Rate    acetaminophen  650 mg Oral Q6H PRN Enio Valerio PA-C      cefTRIAXone  1,000 mg Intravenous Q24H Jose Winters MD      furosemide  10 mg/hr Intravenous Continuous Jose Winters MD 10 mg/hr (02/08/22 1640)    influenza vaccine  0 5 mL Intramuscular Prior to discharge Fawn Quinn MD      metoprolol succinate  25 mg Oral BID Jose SALAS Arnie Cartagena MD      oxyCODONE  10 mg Oral Q4H PRN Ellie Ricardo PA-C      oxyCODONE  5 mg Oral Q4H PRN Ellie Ricardo PA-C      potassium chloride  40 mEq Oral BID Catalina Richardson PA-C      senna-docusate sodium  2 tablet Oral BID Jose Martin MD          Today, Patient Was Seen By: Ellie Ricardo PA-C    **Please Note: This note may have been constructed using a voice recognition system  **

## 2022-02-09 NOTE — CONSULTS
Consultation - Cardiology   Charla Dubin 57 y o  male MRN: [id number]  Unit/Bed#: 403-01 Encounter: [id number]    Consults      Physician Requesting Consult: Katharine Gonzalez MD  Reason for Consult / Principal Problem:  Acute on chronic diastolic congestive heart failure    Assessment/Plan:  1  Acute on chronic diastolic congestive heart failure   - patient is significantly volume overloaded on exam, no standing weight currently but dry weight likely around 300 lbs, patient was last 354 lbs on his home scale   - agree with lasix gtt - will continue 10 mg/hr today, will likely need to titrate   - he needs daily standing weights and strict I/O   - continue low sodium diet and fluid restriction   - monitor daily BMP    2  Chronic atrial fibrillation   - borderline rate controlled   - continue metoprolol at current dose   - coumadin on hold given #3    3  Acute on chronic anemia   - hemoglobin dropped to 6 3 overnight, s/p 1 unit of PRBC's   - hemoglobin this AM 7 6   - patient with abdominal wall hematoma, coumadin is on hold   - GI consult pending    4  Hypertension    - controlled on current regimen with mild hypotension    5  Hypokalemia   - potassium 2 5 on admission, now 3 2   - will order potassium chloride 40 mEq BID   - monitor daily BMP    History of Present Illness   HPI: Charla Dubin is a 57y o  year old male with chronic atrial fibrillation on coumadin, chronic diastolic congestive heart failure, hypertension, diabetes, obstructive sleep apnea, morbid obesity who follows with Dr Jacqueline Park  The patient presented to the ER yesterday for the evaluation of generalized edema  For the past month, he reports gradually worsening lower extremity edema and abdominal bloating  He has noticed dyspnea on exertion  He has been sleeping in a recliner secondary to orthopnea  He is unable to weigh himself daily, but states he was weighed at 354 lbs within the last week   One year ago he had a prolonged hospitalization for congestive heart failure and lost 70 lbs of fluid  Weight at discharge was 298 lbs  More recently before this additional weight gain he was around 320 lbs  He state he has been attempting to follow a low sodium diet and reads food labels faithfully although still has high sodium foods occasionally  He is to be taking lasix 80 mg BID and metolazone twice weekly at home  Recently he states he has been taking an extra 80 mg tablet of lasix and taking metolazone almost every day  Despite this he did not see an increase in his urinary output nor improvement in his symptoms  In addition yesterday he had a mechanical fall at home where he tripped and fell over his dog's leash  He did not hit his head  His home care nurse visited and due to the above symptoms recommended he be evaluated in the ER  Upon admission he had a CT chest showing anasarca, small right pleural effusion, and increased left abdominal wall hematoma  BNP was 1568, actually decreased compared to prior  He was given lasix 40 mg IV x 1 dose and placed on a lasix gtt at 10 mg/hr  Overnight his hemoglobin dropped to 6 3 and he received 1 unit of PRBC's  Cardiology was consulted for further evaluation and management  Review of Systems   Constitutional: Positive for weight gain  Negative for chills and fever  HENT: Negative  Cardiovascular: Positive for dyspnea on exertion, leg swelling and orthopnea  Negative for chest pain, palpitations and syncope  Respiratory: Negative for cough and shortness of breath  Gastrointestinal: Negative for diarrhea, nausea and vomiting  Genitourinary: Negative  Neurological: Negative for dizziness and light-headedness  All other systems reviewed and are negative      Historical Information   Past Medical History:   Diagnosis Date    A-fib [facility])     Acute renal failure superimposed on stage 3b chronic kidney disease ([facility] [address] ) 03/15/2021    Cardiac disease     Chronic combined systolic (congestive) and diastolic (congestive) heart failure (HCC)     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 ascites (HCC)     Class 3 severe obesity due to excess calories with serious comorbidity and body mass index (BMI) of 45 0 to 49 9 in adult [facility]) 10/17/2018    Diabetes mellitus ([facility] [address] )     Dilated cardiomyopathy ([facility] [address] )     History of echocardiogram 11/24/2014    EF 0 30 (30%), Likely mod LV systolic dysfunction  Likely RV dysfunction as well   History of Lexiscan MPI 02/19/2016    EF 0 43 (43%), no prior MI or ischemia   Hx of echocardiogram 04/28/2017    Normal EF, Normal LV systolic function  Mild concentric LV hypertrophy  Mild mitral and tricuspid regurg   Hx: recurrent pneumonia     Hypertension     Hypokalemia 09/04/2020    Long term (current) use of anticoagulants     Morbid (severe) obesity due to excess calories (HCC)     Neuropathy     Pleural effusion on right     Stage 3 chronic kidney disease ([facility] [address] )      Past Surgical History:   Procedure Laterality Date    HERNIA REPAIR      IR CHEST TUBE CHECK/CHANGE/REPOSITION/UPSIZE  5/27/2021    IR CHEST TUBE PLACEMENT  5/22/2021    IR PELVIC ANGIOGRAM  12/4/2021    IR THORACENTESIS  8/25/2020    IR THORACENTESIS  2/23/2021    LUNG DECORTICATION Right 6/2/2021    Procedure: DECORTICATION LUNG;  Surgeon: Bg Zazueta MD;  [facility];  Service: Thoracic    SPLENECTOMY, TOTAL      THORACOSCOPY VIDEO ASSISTED SURGERY (VATS) Right 6/2/2021    Procedure: THORACOSCOPY VIDEO ASSISTED SURGERY (VATS);   Surgeon: Bg Zazueta MD;  [facility];  Service: Thoracic    VASCULAR SURGERY Right     leg     Social History     Substance and Sexual Activity   Alcohol Use Yes    Comment: 4 or 5 drinks weekly/bloody chani's     Social History     Substance and Sexual Activity   Drug Use Never     Social History     Tobacco Use   Smoking Status Current Some Day Smoker    Packs/day: 0 25    Types: Cigarettes   Smokeless Tobacco Never Used     Family History: non-contributory    Meds/Allergies   all current active meds have been reviewed  furosemide, 10 mg/hr, Last Rate: 10 mg/hr (02/08/22 1640)        No Known Allergies    Objective   Vitals: Blood pressure 97/64, pulse 99, temperature 97 9 °F (36 6 °C), resp  rate 16, height 6' (1 829 m), weight (!) 172 kg (380 lb), SpO2 100 %  , Body mass index is 51 54 kg/m² ,   Orthostatic Blood Pressures      Most Recent Value   Blood Pressure 97/64 filed at 02/09/2022 0855   Patient Position - Orthostatic VS Sitting filed at 02/08/2022 7173        Systolic (10JHZ), DSS:162 , Min:94 , ARN:188     Diastolic (77OVV), VIR:92, Min:55, Max:87      Intake/Output Summary (Last 24 hours) at 2/9/2022 1141  Last data filed at 2/9/2022 0838  Gross per 24 hour   Intake 890 ml   Output 950 ml   Net -60 ml       Weight (last 2 days)     Date/Time Weight    02/08/22 1249 172 (380)          Invasive Devices  Report    Peripheral Intravenous Line            Peripheral IV 02/08/22 Dorsal (posterior); Right Hand <1 day    Peripheral IV 02/08/22 Right Antecubital <1 day                  Physical Exam  Vitals reviewed  Constitutional:       General: He is not in acute distress  Appearance: He is well-developed  He is obese  He is not diaphoretic  HENT:      Head: Normocephalic and atraumatic  Eyes:      Pupils: Pupils are equal, round, and reactive to light  Neck:      Vascular: No carotid bruit  Cardiovascular:      Rate and Rhythm: Normal rate  Rhythm irregularly irregular  Pulses:           Radial pulses are 2+ on the right side and 2+ on the left side  Heart sounds: S1 normal and S2 normal  No murmur heard  Pulmonary:      Effort: Pulmonary effort is normal  No respiratory distress  Breath sounds: Normal breath sounds  No wheezing or rales  Abdominal:      General: There is distension  Tenderness: There is no abdominal tenderness  Musculoskeletal:         General: Normal range of motion  Cervical back: Normal range of motion  Right lower leg: Edema (+2/3 bilateral lower extremity edema extending into thighs) present  Left lower leg: Edema present  Skin:     General: Skin is warm and dry  Findings: No erythema  Neurological:      General: No focal deficit present  Mental Status: He is alert and oriented to person, place, and time  Psychiatric:         Mood and Affect: Mood normal          Behavior: Behavior normal              Laboratory Results:        CBC with diff:   Results from last 7 days   Lab Units 02/09/22  0913 02/09/22  0520 02/09/22  0145 02/08/22  1732 02/08/22  1256   WBC Thousand/uL  --  5 50  --   --  5 58   HEMOGLOBIN g/dL 7 6* 6 6* 6 3* 7 0* 7 1*   HEMATOCRIT % 24 5* 21 4* 19 9* 22 4* 23 1*   MCV fL  --  90  --   --  90   PLATELETS Thousands/uL  --  178  --   --  184   MCH pg  --  27 7  --   --  27 7   MCHC g/dL  --  30 8*  --   --  30 7*   RDW %  --  17 7*  --   --  17 9*   MPV fL  --  10 8  --   --  10 4   NRBC AUTO /100 WBCs  --  0  --   --  0         CMP:  Results from last 7 days   Lab Units 02/09/22  0520 02/08/22  1256   POTASSIUM mmol/L 3 2* 2 5*   CHLORIDE mmol/L 90* 89*   CO2 mmol/L 35* 37*   BUN mg/dL 80* 82*   CREATININE mg/dL 1 87* 1 91*   CALCIUM mg/dL 8 0* 8 1*   AST U/L 22  --    ALT U/L 11*  --    ALK PHOS U/L 143*  --    EGFR ml/min/1 73sq m 37 36         BMP:  Results from last 7 days   Lab Units 02/09/22  0520 02/08/22  1256   POTASSIUM mmol/L 3 2* 2 5*   CHLORIDE mmol/L 90* 89*   CO2 mmol/L 35* 37*   BUN mg/dL 80* 82*   CREATININE mg/dL 1 87* 1 91*   CALCIUM mg/dL 8 0* 8 1*       BNP:  No results for input(s): BNP in the last 72 hours      Magnesium:   Results from last 7 days   Lab Units 02/09/22  0520 02/08/22  1256   MAGNESIUM mg/dL 2 2 2 1       Coags:   Results from last 7 days   Lab Units 02/08/22  1256 02/03/22  0000   PTT seconds 52*  --    INR  1 63* 1 7       TSH:       Hemoglobin A1C       Lipid Profile:         Cardiac testing:   Results for orders placed during the hospital encounter of 21    Echo complete with contrast if indicated    Narrative  [address] [place]  [address], [place] [address]  [phone]    Transthoracic Echocardiogram  2D, M-mode, Doppler, and Color Doppler    Study date:  2021    Patient: Migdalia Resendez  MR number: [id number]  Account number: [de-identified]  : 1960  Age: 57 years  Gender: Male  Status: Inpatient  Location: Bedside  Height: 71 in  Weight: 350 lb  BP: 130/ 80 mmHg    Indications: shortness of breath    Diagnoses: 428 0 - CONGESTIVE HEART FAILURE    Sonographer:  LATASHA Aldrich  Primary Physician:  Marine Ureña DO  Referring Physician:  Mp Jha DO  Group:  [facility]  Interpreting Physician:  Chasity Jones DO    SUMMARY    LEFT VENTRICLE:  Systolic function was normal  Ejection fraction was estimated to be 60 %  There were no regional wall motion abnormalities  Wall thickness was mildly increased  RIGHT VENTRICLE:  The ventricle was moderately dilated  Systolic function was mildly reduced  LEFT ATRIUM:  The atrium was mildly dilated  RIGHT ATRIUM:  The atrium was moderately dilated  MITRAL VALVE:  There was mild regurgitation  TRICUSPID VALVE:  There was moderate regurgitation  Estimated peak PA pressure was 52 mmHg  HISTORY: PRIOR HISTORY: CHF, morbid obesity    PROCEDURE: The procedure was performed at the bedside  This was a routine study  The transthoracic approach was used  The study included complete 2D imaging, M-mode, complete spectral Doppler, and color Doppler  The heart rate was 80 bpm,  at the start of the study  Echocardiographic views were limited due to poor patient compliance and poor acoustic window availability  This was a technically difficult study      LEFT VENTRICLE: Size was normal  Systolic function was normal  Ejection fraction was estimated to be 60 %  There were no regional wall motion abnormalities  Wall thickness was mildly increased  DOPPLER: The study was not technically  sufficient to allow evaluation of LV diastolic function  RIGHT VENTRICLE: The ventricle was moderately dilated  Systolic function was mildly reduced  LEFT ATRIUM: The atrium was mildly dilated  RIGHT ATRIUM: The atrium was moderately dilated  MITRAL VALVE: Valve structure was normal  There was normal leaflet separation  DOPPLER: The transmitral velocity was within the normal range  There was no evidence for stenosis  There was mild regurgitation  AORTIC VALVE: The valve was trileaflet  Leaflets exhibited normal thickness and normal cuspal separation  DOPPLER: Transaortic velocity was within the normal range  There was no evidence for stenosis  There was no regurgitation  TRICUSPID VALVE: The valve structure was normal  There was normal leaflet separation  DOPPLER: The transtricuspid velocity was within the normal range  There was no evidence for stenosis  There was moderate regurgitation  Estimated peak PA  pressure was 52 mmHg  PULMONIC VALVE: Leaflets exhibited normal thickness, no calcification, and normal cuspal separation  DOPPLER: The transpulmonic velocity was within the normal range  There was no regurgitation  PERICARDIUM: There was no pericardial effusion  The pericardium was normal in appearance  AORTA: The root exhibited normal size  SYSTEMIC VEINS: IVC: The inferior vena cava was not well visualized      SYSTEM MEASUREMENT TABLES    2D  %FS: 31 45 %  Ao Diam: 3 22 cm  EDV(Teich): 148 86 ml  EF(Teich): 58 74 %  ESV(Teich): 61 42 ml  IVSd: 1 51 cm  LA Area: 20 63 cm2  LA Diam: 5 65 cm  LVIDd: 5 52 cm  LVIDs: 3 79 cm  LVPWd: 1 08 cm  RA Area: 33 86 cm2  RWT: 0 39  SV(Teich): 87 44 ml    CW  RAP: 0 mmHg  TR Vmax: 3 47 m/s  TR maxP 33 mmHg    PW  E' Sept: 0 09 m/s  E/E' Sept: 13 35  MV A Ti: 0 48 m/s  MV Dec Posey: 4 5 m/s2  MV DecT: 253 15 ms  MV E Ti: 1 14 m/s  MV E/A Ratio: 2 36  RVSP: 48 33 mmHg    [facility] Accredited Echocardiography Laboratory    Prepared and electronically signed by    Tierra Roberts DO  Signed 23-Feb-2021 08:06:00    No results found for this or any previous visit  No results found for this or any previous visit  No results found for this or any previous visit  Imaging: I have personally reviewed pertinent reports  XR Trauma chest portable    Result Date: 2/8/2022  Narrative: CHEST INDICATION:   TRAUMA  COMPARISON:  12/4/2021 EXAM PERFORMED/VIEWS:  XR CHEST PORTABLE FINDINGS: Cardiomediastinal silhouette appears enlarged  Small right pleural effusion with right lower lobe airspace opacity no pneumothorax  No left pleural effusion    Osseous structures appear within normal limits for patient age  Impression: Cardiomegaly, small right pleural effusion and right lower lobe airspace opacity  Workstation performed: [id number]     TRAUMA - CT head wo contrast    Result Date: 2/8/2022  Narrative: CT BRAIN - WITHOUT CONTRAST INDICATION:   TRAUMA  Frequent falls COMPARISON:  None  TECHNIQUE:  CT examination of the brain was performed  In addition to axial images, sagittal and coronal 2D reformatted images were created and submitted for interpretation  Radiation dose length product (DLP) for this visit:  875 21 mGy-cm   This examination, like all CT scans performed in the [facility], was performed utilizing techniques to minimize radiation dose exposure, including the use of iterative  reconstruction and automated exposure control  IMAGE QUALITY:  Diagnostic  FINDINGS: PARENCHYMA:  No intracranial mass, mass effect or midline shift  No CT signs of acute infarction  No acute parenchymal hemorrhage  VENTRICLES AND EXTRA-AXIAL SPACES:  Normal for the patient's age  VISUALIZED ORBITS AND PARANASAL SINUSES:  Unremarkable   CALVARIUM AND EXTRACRANIAL SOFT TISSUES:  Normal  Impression: No acute intracranial abnormality  Workstation performed: [id number]     TRAUMA - CT spine cervical wo contrast    Result Date: 2/8/2022  Narrative: CT CERVICAL SPINE - WITHOUT CONTRAST INDICATION:   TRAUMA  COMPARISON:  CT 9/13/21 TECHNIQUE:  CT examination of the cervical spine was performed without intravenous contrast   Contiguous axial images were obtained  Sagittal and coronal reconstructions were performed  Radiation dose length product (DLP) for this visit:  584 66 mGy-cm   This examination, like all CT scans performed in the [facility], was performed utilizing techniques to minimize radiation dose exposure, including the use of iterative  reconstruction and automated exposure control  IMAGE QUALITY:  Diagnostic  FINDINGS: ALIGNMENT:  Normal alignment of the cervical spine  No subluxation  VERTEBRAL BODIES:  No fracture  DEGENERATIVE CHANGES:  Moderate multilevel cervical degenerative changes are noted  No critical central canal stenosis  PREVERTEBRAL AND PARASPINAL SOFT TISSUES:  Left neck base lymphadenopathy measures as large as 1 3 cm  THORACIC INLET:  Normal      Impression: 1  No cervical spine fracture or traumatic malalignment  2   Cervical lymphadenopathy  Workstation performed: [id number]     TRAUMA - CT chest abdomen pelvis w contrast    Result Date: 2/8/2022  Narrative: CT CHEST, ABDOMEN AND PELVIS WITH IV CONTRAST INDICATION:   TRAUMA  COMPARISON:  CT 12/4/2021 and 9/13/2021 TECHNIQUE: CT examination of the chest, abdomen and pelvis was performed  Axial, sagittal, and coronal 2D reformatted images were created from the source data and submitted for interpretation  Radiation dose length product (DLP) for this visit:  3437 28 mGy-cm     This examination, like all CT scans performed in the [facility], was performed utilizing techniques to minimize radiation dose exposure, including the use of iterative reconstruction and automated exposure control  IV Contrast:  100 mL of iohexol (OMNIPAQUE) Enteric Contrast: Enteric contrast was administered  FINDINGS: CHEST LUNGS:  Right lower lobe airspace opacities appear progressed PLEURA:  Small right pleural effusion HEART/GREAT VESSELS: Cardiomegaly  No thoracic aortic aneurysm  There is enlargement of the central pulmonary arterial tree suggesting some element of pulmonary artery hypertension  MEDIASTINUM AND DILLON:  Left neck base lymphadenopathy measures as large as 1 1 cm series 2/8, this is overestimated on the concurrent cervical spine CT report as no contrast was given Superior mediastinal / prevascular lymph node measures 1 2 cm series 2/14 CHEST WALL AND LOWER NECK:   Unremarkable  ABDOMEN LIVER/BILIARY TREE:  Liver span is 21 cm Nodular contour is present  GALLBLADDER:  Contains gallstones SPLEEN:  Mild splenomegaly  Stable pericapsular collection with peripheral calcification measures about 6 cm, likely posttraumatic PANCREAS:  Unremarkable  ADRENAL GLANDS:  Unremarkable  KIDNEYS/URETERS:  Unremarkable  No hydronephrosis  STOMACH AND BOWEL:  Unremarkable  APPENDIX:  No findings to suggest appendicitis  ABDOMINOPELVIC CAVITY:  Stable cystic structure adjacent to the 2nd portion of the duodenum measuring about 3 4 cm series 2/77 Mild mesenteric edema Mild volume ascites centered within the upper abdomen VESSELS:  Unremarkable for patient's age  PELVIS REPRODUCTIVE ORGANS:  Unremarkable for patient's age  URINARY BLADDER:  Unremarkable  ABDOMINAL WALL/INGUINAL REGIONS:  Anasarca 14 x 11 7 cm dense collection noted within the left lower abdominal wall, which is mildly increased from prior  There is no metabolic material within the left abdominal wall vasculature Venous collaterals are seen throughout the lower abdominal wall as well as the right inguinal region and right proximal thigh OSSEOUS STRUCTURES:  No acute fracture or destructive osseous lesion  Impression: 1    Left abdominal wall hematoma is mildly increased from December 2021 2  Mediastinal/left neck base lymphadenopathy, consider follow-up neck and chest CT in 3 months to evaluate for persistence 3  Right lower lobe airspace opacities, concerning for pneumonia 4  Anasarca 5  Small right pleural effusion 6  Cirrhotic morphology with ascites 7    Additional findings as above Workstation performed: [id number]       EKG reviewed personally: atrial fibrillation, low voltage, incomplete RBBB  Telemetry reviewed personally: atrial fibrillation, HR in the low 100's    Assessment:  Principal Problem:    Acute on chronic diastolic (congestive) heart failure (HCC)  Active Problems:    Cirrhosis of liver with ascites (HCC)    Chronic anemia    Permanent atrial fibrillation (HCC)    Hyponatremia    CKD (chronic kidney disease)    Morbid (severe) obesity due to excess calories ([facility] [address] )    Abdominal wall hematoma    RLL pneumonia      Code Status: Level 1 - Full Code

## 2022-02-09 NOTE — PLAN OF CARE
Problem: Potential for Falls  Goal: Patient will remain free of falls  Description: INTERVENTIONS:  - Educate patient/family on patient safety including physical limitations  - Instruct patient to call for assistance with activity   - Consult OT/PT to assist with strengthening/mobility   - Keep Call bell within reach  - Keep bed low and locked with side rails adjusted as appropriate  - Keep care items and personal belongings within reach  - Initiate and maintain comfort rounds  - Make Fall Risk Sign visible to staff  - Offer Toileting every 1-2 Hours, in advance of need  - Initiate/Maintain bed and chair alarm  - Obtain necessary fall risk management equipment: fall socks  - Apply yellow socks and bracelet for high fall risk patients  - Consider moving patient to room near nurses station  Outcome: Progressing     Problem: MOBILITY - ADULT  Goal: Maintain or return to baseline ADL function  Description: INTERVENTIONS:  - Educate patient/family on patient safety including physical limitations  - Instruct patient to call for assistance with activity   - Consult OT/PT to assist with strengthening/mobility   - Keep Call bell within reach  - Keep bed low and locked with side rails adjusted as appropriate  - Keep care items and personal belongings within reach  - Initiate and maintain comfort rounds  - Make Fall Risk Sign visible to staff  - Offer Toileting every 1-2 Hours, in advance of need  - Initiate/Maintain bed and chair alarm  - Obtain necessary fall risk management equipment: fall socks  - Apply yellow socks and bracelet for high fall risk patients  - Consider moving patient to room near nurses station  Outcome: Progressing  Goal: Maintains/Returns to pre admission functional level  Description: INTERVENTIONS:  - Perform BMAT or MOVE assessment daily    - Set and communicate daily mobility goal to care team and patient/family/caregiver     - Collaborate with rehabilitation services    - Stand patient 2 times a day  - Ambulate patient 2 times a day  - Out of bed to chair 2 times a day   - Out of bed for meals 2 times a day  - Out of bed for toileting  - Record patient progress and toleration of activity level   Outcome: Progressing     Problem: PAIN - ADULT  Goal: Verbalizes/displays adequate comfort level or baseline comfort level  Description: Interventions:  - Encourage patient to monitor pain and request assistance  - Assess pain using appropriate pain scale  - Administer analgesics based on type and severity of pain and evaluate response  - Implement non-pharmacological measures as appropriate and evaluate response  - Consider cultural and social influences on pain and pain management  - Notify physician/advanced practitioner if interventions unsuccessful or patient reports new pain  Outcome: Progressing     Problem: INFECTION - ADULT  Goal: Absence or prevention of progression during hospitalization  Description: INTERVENTIONS:  - Assess and monitor for signs and symptoms of infection  - Monitor lab/diagnostic results  - Monitor all insertion sites, i e  indwelling lines, tubes, and drains  - Monitor endotracheal if appropriate and nasal secretions for changes in amount and color  - [place] appropriate cooling/warming therapies per order  - Administer medications as ordered  - Instruct and encourage patient and family to use good hand hygiene technique  - Identify and instruct in appropriate isolation precautions for identified infection/condition  Outcome: Progressing  Goal: Absence of fever/infection during neutropenic period  Description: INTERVENTIONS:  - Monitor WBC    Outcome: Progressing     Problem: SAFETY ADULT  Goal: Patient will remain free of falls  Description: INTERVENTIONS:  - Educate patient/family on patient safety including physical limitations  - Instruct patient to call for assistance with activity   - Consult OT/PT to assist with strengthening/mobility   - Keep Call bell within reach  - Keep bed low and locked with side rails adjusted as appropriate  - Keep care items and personal belongings within reach  - Initiate and maintain comfort rounds  - Make Fall Risk Sign visible to staff  - Offer Toileting every 1-2 Hours, in advance of need  - Initiate/Maintain bed and chair alarm  - Obtain necessary fall risk management equipment: fall socks  - Apply yellow socks and bracelet for high fall risk patients  - Consider moving patient to room near nurses station  Outcome: Progressing  Goal: Maintain or return to baseline ADL function  Description: INTERVENTIONS:  - Educate patient/family on patient safety including physical limitations  - Instruct patient to call for assistance with activity   - Consult OT/PT to assist with strengthening/mobility   - Keep Call bell within reach  - Keep bed low and locked with side rails adjusted as appropriate  - Keep care items and personal belongings within reach  - Initiate and maintain comfort rounds  - Make Fall Risk Sign visible to staff  - Offer Toileting every 1-2 Hours, in advance of need  - Initiate/Maintain bed and chair alarm  - Obtain necessary fall risk management equipment: fall socks  - Apply yellow socks and bracelet for high fall risk patients  - Consider moving patient to room near nurses station  Outcome: Progressing  Goal: Maintains/Returns to pre admission functional level  Description: INTERVENTIONS:  - Perform BMAT or MOVE assessment daily    - Set and communicate daily mobility goal to care team and patient/family/caregiver     - Collaborate with rehabilitation services on mobility goals if consulted    - Stand patient 2 times a day  - Ambulate patient 2 times a day  - Out of bed to chair 2 times a day   - Out of bed for meals 2 times a day  - Out of bed for toileting  - Record patient progress and toleration of activity level   Outcome: Progressing     Problem: DISCHARGE PLANNING  Goal: Discharge to home or other facility with appropriate resources  Description: INTERVENTIONS:  - Identify barriers to discharge w/patient and caregiver  - Arrange for needed discharge resources and transportation as appropriate  - Identify discharge learning needs (meds, wound care, etc )  - Arrange for interpretive services to assist at discharge as needed  - Refer to Case Management Department for coordinating discharge planning if the patient needs post-hospital services based on physician/advanced practitioner order or complex needs related to functional status, cognitive ability, or social support system  Outcome: Progressing     Problem: Knowledge Deficit  Goal: Patient/family/caregiver demonstrates understanding of disease process, treatment plan, medications, and discharge instructions  Description: Complete learning assessment and assess knowledge base    Interventions:  - Provide teaching at level of understanding  - Provide teaching via preferred learning methods  Outcome: Progressing     Problem: Prexisting or High Potential for Compromised Skin Integrity  Goal: Skin integrity is maintained or improved  Description: INTERVENTIONS:  - Identify patients at risk for skin breakdown  - Assess and monitor skin integrity  - Assess and monitor nutrition and hydration status  - Monitor labs   - Assess for incontinence   - Turn and reposition patient  - Assist with mobility/ambulation  - Relieve pressure over bony prominences  - Avoid friction and shearing  - Provide appropriate hygiene as needed including keeping skin clean and dry  - Evaluate need for skin moisturizer/barrier cream  - Collaborate with interdisciplinary team   - Patient/family teaching  - Consider wound care consult   Outcome: Progressing     Problem: CARDIOVASCULAR - ADULT  Goal: Maintains optimal cardiac output and hemodynamic stability  Description: INTERVENTIONS:  - Monitor I/O, vital signs and rhythm  - Monitor for S/S and trends of decreased cardiac output  - Administer and titrate ordered vasoactive medications to optimize hemodynamic stability  - Assess quality of pulses, skin color and temperature  - Assess for signs of decreased coronary artery perfusion  - Instruct patient to report change in severity of symptoms  Outcome: Progressing  Goal: Absence of cardiac dysrhythmias or at baseline rhythm  Description: INTERVENTIONS:  - Continuous cardiac monitoring, vital signs, obtain 12 lead EKG if ordered  - Administer antiarrhythmic and heart rate control medications as ordered  - Monitor electrolytes and administer replacement therapy as ordered  Outcome: Progressing     Problem: RESPIRATORY - ADULT  Goal: Achieves optimal ventilation and oxygenation  Description: INTERVENTIONS:  - Assess for changes in respiratory status  - Assess for changes in mentation and behavior  - Position to facilitate oxygenation and minimize respiratory effort  - Oxygen administered by appropriate delivery if ordered  - Initiate smoking cessation education as indicated  - Encourage broncho-pulmonary hygiene including cough, deep breathe, Incentive Spirometry  - Assess the need for suctioning and aspirate as needed  - Assess and instruct to report SOB or any respiratory difficulty  - Respiratory Therapy support as indicated  Outcome: Progressing     Problem: METABOLIC, FLUID AND ELECTROLYTES - ADULT  Goal: Electrolytes maintained within normal limits  Description: INTERVENTIONS:  - Monitor labs and assess patient for signs and symptoms of electrolyte imbalances  - Administer electrolyte replacement as ordered  - Monitor response to electrolyte replacements, including repeat lab results as appropriate  - Instruct patient on fluid and nutrition as appropriate  Outcome: Progressing  Goal: Fluid balance maintained  Description: INTERVENTIONS:  - Monitor labs   - Monitor I/O and WT  - Instruct patient on fluid and nutrition as appropriate  - Assess for signs & symptoms of volume excess or deficit  Outcome: Progressing  Goal: Glucose maintained within target range  Description: INTERVENTIONS:  - Monitor Blood Glucose as ordered  - Assess for signs and symptoms of hyperglycemia and hypoglycemia  - Administer ordered medications to maintain glucose within target range  - Assess nutritional intake and initiate nutrition service referral as needed  Outcome: Progressing     Problem: HEMATOLOGIC - ADULT  Goal: Maintains hematologic stability  Description: INTERVENTIONS  - Assess for signs and symptoms of bleeding or hemorrhage  - Monitor labs  - Administer supportive blood products/factors as ordered and appropriate  Outcome: Progressing

## 2022-02-09 NOTE — QUICK NOTE
Hemoglobin dropped to 6 3  Patient granted consent for blood transfusion ,will transfuse 1 unit of PRBC

## 2022-02-10 NOTE — ASSESSMENT & PLAN NOTE
Wt Readings from Last 3 Encounters:   02/08/22 (!) 172 kg (380 lb)   12/29/21 (!) 152 kg (336 lb)   12/14/21 (!) 152 kg (335 lb 1 6 oz)     With diffuse anasarca  Prior discharge weight is closer to 340lbs, approximately 30-40 lbs up from dry weight  Chronic diuretic regimen is lasix 120mg BID with PRN metalozone  Echo 2/21 EF 60%, PA pressure 52 mm Hg, no significant valvulopathy  Received 40mg IV lasix on admission then transitioned to lasix GTT 10mg/hr on 2/8  Cardiology input appreciated  Low salt diet with 1800cc fluid restriction  Continue intake and output, daily weights  Consider addition of midodrine vs  PRN albumin with asymptomatic hypotension noted

## 2022-02-10 NOTE — RESPIRATORY THERAPY NOTE
RT Protocol Note  Para Erp 57 y o  male MRN: [id number]  Unit/Bed#:  Encounter: [id number]    Assessment    Principal Problem:    Acute on chronic diastolic (congestive) heart failure (HCC)  Active Problems:    Cirrhosis of liver with ascites (HCC)    Acute on chronic anemia    Permanent atrial fibrillation (HCC)    Hyponatremia    CKD (chronic kidney disease)    Morbid (severe) obesity due to excess calories (HCC)    Abdominal wall hematoma    RLL pneumonia      Home Pulmonary Medications:  none       Past Medical History:   Diagnosis Date    A-fib ([place] [address] )     Acute renal failure superimposed on stage 3b chronic kidney disease ([place] [address] ) 03/15/2021    Cardiac disease     Chronic combined systolic (congestive) and diastolic (congestive) heart failure (HCC)     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 ascites (HCC)     Class 3 severe obesity due to excess calories with serious comorbidity and body mass index (BMI) of 45 0 to 49 9 in adult ([place] [address] ) 10/17/2018    Diabetes mellitus ([place] [address] )     Dilated cardiomyopathy ([place] [address] )     History of echocardiogram 11/24/2014    EF 0 30 (30%), Likely mod LV systolic dysfunction  Likely RV dysfunction as well   History of Lexiscan MPI 02/19/2016    EF 0 43 (43%), no prior MI or ischemia   Hx of echocardiogram 04/28/2017    Normal EF, Normal LV systolic function  Mild concentric LV hypertrophy  Mild mitral and tricuspid regurg      Hx: recurrent pneumonia     Hypertension     Hypokalemia 09/04/2020    Long term (current) use of anticoagulants     Morbid (severe) obesity due to excess calories (HCC)     Neuropathy     Pleural effusion on right     Stage 3 chronic kidney disease (HCC)      Social History     Socioeconomic History    Marital status: /Civil Union     Spouse name: None    Number of children: None    Years of education: None    Highest education level: None   Occupational History    None   Tobacco Use    Smoking status: Current Some Day Smoker Packs/day: 0 25     Types: Cigarettes    Smokeless tobacco: Never Used   Vaping Use    Vaping Use: Never used   Substance and Sexual Activity    Alcohol use: Yes     Comment: 4 or 5 drinks weekly/bloody chani's    Drug use: Never    Sexual activity: Yes     Partners: Female   Other Topics Concern    None   Social History Narrative    None     Social Determinants of Health     Financial Resource Strain: Not on file   Food Insecurity: No Food Insecurity    Worried About Running Out of Food in the Last Year: Never true    Tato of Food in the Last Year: Never true   Transportation Needs: No Transportation Needs    Lack of Transportation (Medical): No    Lack of Transportation (Non-Medical): No   Physical Activity: Not on file   Stress: Not on file   Social Connections: Not on file   Intimate Partner Violence: Not on file   Housing Stability: Unknown    Unable to Pay for Housing in the Last Year: No    Number of Places Lived in the Last Year: Not on file    Unstable Housing in the Last Year: No       Subjective         Objective    Physical Exam:   Assessment Type: Assess only  General Appearance: Alert,Awake  Respiratory Pattern: Symmetrical,Spontaneous,Normal,Dyspnea with exertion  Chest Assessment: Chest expansion symmetrical,Trachea midline  Bilateral Breath Sounds: Diminished,Crackles (bases > LLL)  Cough: None  O2 Device: ra    Vitals:  Blood pressure (!) 97/48, pulse 103, temperature (!) 97 3 °F (36 3 °C), temperature source Temporal, resp  rate 16, height 6' (1 829 m), weight (!) 172 kg (380 lb), SpO2 99 %  Imaging and other studies: I have personally reviewed pertinent reports        O2 Device: ra     Plan     bronchodilator therapy albuterol 0 083% Q6PRN  For SOB and wheezing

## 2022-02-10 NOTE — ASSESSMENT & PLAN NOTE
Due a history of alcohol use  Now with significant abdominal ascites  Consulted IR for possible paracentesis and labs  GI consult appreciated, recommend outpatient EGD   May need to consider possible inpatient EGD if hemoccult testing positive  MELD 24 (INR is in the setting of coumadin use however)

## 2022-02-10 NOTE — CASE MANAGEMENT
Case Management Discharge Planning Note    Patient name Natividad Ferraro /[id number] MRN [id number]  : 1960 Date 2/10/2022       Current Admission Date: 2022  Current Admission Diagnosis:Acute on chronic diastolic (congestive) heart failure [facility])   Patient Active Problem List    Diagnosis Date Noted    Abnormal CT of the chest 2022    RLL pneumonia 2022    Acute blood loss anemia 2021    Abdominal wall hematoma 2021    Long term (current) use of anticoagulants 10/14/2021    Hyperphosphatemia 2021    Elevated alkaline phosphatase level 2021    Neutropenia ([facility] ) 2021    Pancytopenia ([facility] ) 2021    NSVT (nonsustained ventricular tachycardia) ([facility] ) 2021    Hypoalbuminemia 2021    Chronic right-sided congestive heart failure ([facility] ) 2021    Severe sepsis with septic shock (HCC) 2021    Panniculitis 2021    Iron deficiency 2021    Right wrist pain 2021    Morbid (severe) obesity due to excess calories (HCC)     Smoking     Hypotension 2021    Acute kidney injury superimposed on chronic kidney disease ([facility] ) 03/15/2021    Hypertensive nephrosclerosis 03/15/2021    CKD (chronic kidney disease) 03/15/2021    Hypercalcemia 03/10/2021    Anemia 2021    Constipation 2021    Pulmonary hypertension ([facility] ) 2021    Vitamin D deficiency 2021    Atelectasis 2021    Hyponatremia 2021    Lactic acidosis 2021    Permanent atrial fibrillation ([facility] ) 2021    Diverticulosis of colon 2021    Lesion of spleen 2021    Chronic diastolic CHF (congestive heart failure) [facility] ) 2021    Tobacco use 2021    Other cirrhosis of liver ([facility] ) 2020    Encounter for screening for other viral diseases  2020    Hypokalemia 2020    Thrombocytopenia ([facility] ) 2020    Pleural effusion, right 2020    SOB (shortness of breath) 08/21/2020    Cirrhosis of liver with ascites ([facility] ) 08/21/2020    Tobacco abuse 08/21/2020    Chronic anemia 08/21/2020    STEFF (obstructive sleep apnea)     Hypertensive heart and chronic kidney disease with heart failure and stage 1 through stage 4 chronic kidney disease, or chronic kidney disease ([facility] ) 10/16/2019    Chronic venous insufficiency 05/01/2019    Varicose veins of both lower extremities with inflammation 05/01/2019    Lymphedema 10/24/2018    Acute on chronic diastolic (congestive) heart failure ([facility] ) 10/17/2018      LOS (days): 2  Geometric Mean LOS (GMLOS) (days): 3 80  Days to GMLOS:2     OBJECTIVE:  Risk of Unplanned Readmission Score: 42         Current admission status: Inpatient   Preferred Pharmacy:   [facility]/pharmacy #[id number]- [address], [address]  [address] [place] [address]  Phone: [phone] Fax: [phone]    Primary Care Provider: Lynnette Aiken DO    Primary Insurance: [facility]  Secondary Insurance:     DISCHARGE DETAILS:    Discharge planning discussed with[de-identified] patient  Freedom of Choice: Yes  Comments - Freedom of Choice: resumption of [facility] from [address]         Is the patient interested in [address] at discharge?: Yes  [address] requested[de-identified] [place] Name[de-identified] Other ([facility])  [id number] Provider[de-identified] PCP  Home Health Services Needed[de-identified] Heart Failure Management,Gait/ADL Training,Strengthening/Theraputic Exercises to Improve Function,Evaluate Functional Status and Safety  Homebound Criteria Met[de-identified] Uses an Assist Device (i e  cane, walker, etc),Requires the Assistance of Another Person for Safe Ambulation or to Leave the Home  Supporting Clincal Findings[de-identified] Limited Endurance

## 2022-02-10 NOTE — PROGRESS NOTES
[address] [facility] [address]  Progress Note - Sally Lara 1960, 57 y o  male MRN: [id number]  Unit/Bed#: 403-01 Encounter: [id number]  Primary Care Provider: Juan Olivarez DO   Date and time admitted to hospital: 2/8/2022 12:45 PM    Acute on chronic anemia  Assessment & Plan  Acute on chronic in the setting of chronic disease, Previous baseline hemoglobin is 7-8  Anemia is also in in the setting of rectus sheath hematoma, prior history of AVMs per GI  Also anticoagulated on Coumadin which presents an issue with acute bleeding  Now also hypotensive, tachcardic  S/p total 3 units PRBCs, last transfusion overnight, Hgb appropriately improved from 6 8 --> 7 7  Continue to monitor H/H closely - for recheck today at 1400  Continue to Transfuse for hgb < 7  Follow hemoccult testing, consider further GI workup if needed  Will transfer to ICU for closer hemodynamic monitoring in the setting of possible acute bleed  General surgery aware  RLL pneumonia  Assessment & Plan  CT chest showing concern for RLL pneumonia  procalcitonin x 2 positive at 0 5 however could be elevated due to CKD  Urinary antigens negative, discontinue azithromycin  Continue IV ceftriaxone - day #3  WBC remains normal, afebrile  Check sputum culture - pending  Added respiratory protocol, incentive spirometry      Abdominal wall hematoma  Assessment & Plan  Slightly increased in size since December  Given drop in hemoglobin and recent trauma will continue to hold warfarin   Continue to check H&H frequently - did receive 3 units PRBCs so far  Surgical consult appreciated, no intervention needed at this time, abdominal binder ordered, pain meds PRN  Continue to monitor closely  Cirrhosis of liver with ascites [facility])  Assessment & Plan  Due a history of alcohol use  Now with significant abdominal ascites  Consulted IR for possible paracentesis and labs  GI consult appreciated, recommend outpatient EGD     May need to consider possible inpatient EGD if hemoccult testing positive  MELD 24 (INR is in the setting of coumadin use however)  * Acute on chronic diastolic (congestive) heart failure [facility])  Assessment & Plan  Wt Readings from Last 3 Encounters:   02/08/22 (!) 172 kg (380 lb)   12/29/21 (!) 152 kg (336 lb)   12/14/21 (!) 152 kg (335 lb 1 6 oz)     With diffuse anasarca  Prior discharge weight is closer to 340lbs, approximately 30-40 lbs up from dry weight  Chronic diuretic regimen is lasix 120mg BID with PRN metalozone  Echo 2/21 EF 60%, PA pressure 52 mm Hg, no significant valvulopathy  Received 40mg IV lasix on admission then transitioned to lasix GTT 10mg/hr on 2/8  Cardiology input appreciated  Low salt diet with 1800cc fluid restriction  Continue intake and output, daily weights  Consider addition of midodrine vs  PRN albumin with asymptomatic hypotension noted  Morbid (severe) obesity due to excess calories ([facility] [address] )  Assessment & Plan  BMI 51  Encourage lifestyle modifications  Currently being diuresed        CKD (chronic kidney disease)  Assessment & Plan  Lab Results   Component Value Date    EGFR 27 02/10/2022    EGFR 37 02/09/2022    EGFR 36 02/08/2022    CREATININE 2 43 (H) 02/10/2022    CREATININE 1 87 (H) 02/09/2022    CREATININE 1 91 (H) 02/08/2022     Likely stage 3B  Renal function has fluctuated over the past 3-4 months but it appears his baseline creatinine is around 2  Monitor renal function closely with diuresis and replete electrolytes  Currently creatinine is downtrending   Hyponatremia  Assessment & Plan  129  Likely hypervolemic hyponatremia due to CHF exacerbation  Monitor Na level in setting of diuresis  Permanent atrial fibrillation (HCC)  Assessment & Plan  Rate initially controlled on toprol XL - now increased likely multifactorial in the setting of anemia, recent ambulation, pulmonary edema with wheezing, volume overload    Anticoagulated with coumadin at home - Holding coumadin for now due to anemia and abdominal wall hematoma   INR trending down at 1 5 today  Given concern over ongoing acute bleeding - will give 1 time dose of IV vitamin K now  VTE Pharmacologic Prophylaxis:   High Risk (Score >/= 5) - Pharmacological DVT Prophylaxis Contraindicated  Sequential Compression Devices Ordered  Patient Centered Rounds: I performed bedside rounds with nursing staff today  Discussions with Specialists or Other Care Team Provider: d/w attending    Education and Discussions with Family / Patient: Updated  (wife) via phone  Time Spent for Care: 30 minutes  More than 50% of total time spent on counseling and coordination of care as described above  Current Length of Stay: 2 day(s)  Current Patient Status: Inpatient   Certification Statement: The patient will continue to require additional inpatient hospital stay due to need for IV lasix infusion, close lab monitoring  Discharge Plan: Anticipate discharge in >72 hrs to home with home services  vs  Possible STR pending progress  Code Status: Level 1 - Full Code    Subjective:   Patient feels well today, just tired  No SOB, CP  Not dizzy or lightheaded  Worked well with PT/OT this AM per nursing  No melena reported  Objective:   Vitals:   Temp (24hrs), Av 1 °F (36 7 °C), Min:97 3 °F (36 3 °C), Max:98 5 °F (36 9 °C)    Temp:  [97 3 °F (36 3 °C)-98 5 °F (36 9 °C)] 97 3 °F (36 3 °C)  HR:  [100-115] 114  Resp:  [16-18] 17  BP: ()/(54-71) 80/54  SpO2:  [91 %-96 %] 94 %  Body mass index is 51 54 kg/m²  Input and Output Summary (last 24 hours): Intake/Output Summary (Last 24 hours) at 2/10/2022 1005  Last data filed at 2/10/2022 0809  Gross per 24 hour   Intake 1430 ml   Output --   Net 1430 ml         Physical Exam:   Physical Exam  Vitals and nursing note reviewed  Constitutional:       Appearance: He is obese  He is ill-appearing (chronically ill appearing )     HENT:      Head: Normocephalic  Mouth/Throat:      Mouth: Mucous membranes are moist    Cardiovascular:      Rate and Rhythm: Regular rhythm  Comments: Diffuse anasarca noted  Pulmonary:      Effort: Pulmonary effort is normal       Breath sounds: Wheezing (scattered expiratory wheezing ) present  Abdominal:      Tenderness: There is no abdominal tenderness  There is no guarding  Comments: Abdominal ascites as well as abdominal wall edema  Musculoskeletal:      Right lower leg: Edema present  Left lower leg: Edema present  Skin:     General: Skin is warm and dry  Neurological:      Mental Status: He is alert and oriented to person, place, and time  Psychiatric:         Mood and Affect: Mood normal             Additional Data:   Labs:  Results from last 7 days   Lab Units 02/10/22  0911   WBC Thousand/uL 6 39   HEMOGLOBIN g/dL 7 7*   HEMATOCRIT % 25 5*   PLATELETS Thousands/uL 192   NEUTROS PCT % 76*   LYMPHS PCT % 8*   MONOS PCT % 9   EOS PCT % 5     Results from last 7 days   Lab Units 02/10/22  0636 02/09/22  0520 02/09/22  0520   SODIUM mmol/L 130*   < > 129*   POTASSIUM mmol/L 4 3   < > 3 2*   CHLORIDE mmol/L 89*   < > 90*   CO2 mmol/L 32   < > 35*   BUN mg/dL 84*   < > 80*   CREATININE mg/dL 2 43*   < > 1 87*   ANION GAP mmol/L 9   < > 4   CALCIUM mg/dL 8 1*   < > 8 0*   ALBUMIN g/dL  --   --  1 9*   TOTAL BILIRUBIN mg/dL  --   --  0 82   ALK PHOS U/L  --   --  143*   ALT U/L  --   --  11*   AST U/L  --   --  22   GLUCOSE RANDOM mg/dL 138   < > 141*    < > = values in this interval not displayed  Results from last 7 days   Lab Units 02/10/22  0911   INR  1 53*             Results from last 7 days   Lab Units 02/09/22  0520 02/08/22  1732   PROCALCITONIN ng/ml 0 55* 0 54*       Lines/Drains:  Invasive Devices  Report    Peripheral Intravenous Line            Peripheral IV 02/08/22 Dorsal (posterior); Right Hand 1 day    Peripheral IV 02/08/22 Right Antecubital 1 day                      Imaging: No pertinent imaging reviewed  Recent Cultures (last 7 days):   Results from last 7 days   Lab Units 02/08/22  1910 02/08/22 1732   GRAM STAIN RESULT  1+ Epithelial cells per low power field*  1+ Polys*  3+ Gram positive cocci in pairs*  Rare Gram negative rods*  --    LEGIONELLA URINARY ANTIGEN   --  Negative       Last 24 Hours Medication List:   Current Facility-Administered Medications   Medication Dose Route Frequency Provider Last Rate    acetaminophen  650 mg Oral Q6H PRN Scott Mc PA-C      albumin human  25 g Intravenous Once Jose Browne MD      cefTRIAXone  1,000 mg Intravenous Q24H Jose Browne MD 1,000 mg (02/09/22 1731)    furosemide  10 mg/hr Intravenous Continuous Jose Browne MD 10 mg/hr (02/10/22 0329)    influenza vaccine  0 5 mL Intramuscular Prior to discharge Ortiz Pham MD      metoprolol succinate  25 mg Oral BID Jose Browne MD      oxyCODONE  10 mg Oral Q4H PRN Scott Mc PA-C      oxyCODONE  5 mg Oral Q4H PRN Scott Mc PA-C      phytonadione  10 mg Intravenous Once Kaitlyn Freitas PA-C      potassium chloride  40 mEq Oral BID Arnaldo Fleischer, PA-C      senna-docusate sodium  2 tablet Oral BID Jose Browne MD          Today, Patient Was Seen By: Kaitlyn Freitas PA-C    **Please Note: This note may have been constructed using a voice recognition system  **

## 2022-02-10 NOTE — ASSESSMENT & PLAN NOTE
Acute on chronic in the setting of chronic disease, Previous baseline hemoglobin is 7-8  Anemia is also in in the setting of rectus sheath hematoma, prior history of AVMs per GI  Also anticoagulated on Coumadin which presents an issue with acute bleeding  Now also hypotensive, tachcardic  S/p total 3 units PRBCs, last transfusion overnight, Hgb appropriately improved from 6 8 --> 7 7  Continue to monitor H/H closely - for recheck today at 1400  Continue to Transfuse for hgb < 7  Follow hemoccult testing, consider further GI workup if needed  Will transfer to ICU for closer hemodynamic monitoring in the setting of possible acute bleed  General surgery aware

## 2022-02-10 NOTE — NURSING NOTE
Pt being upgraded to ICU, report called to Anahy Ashford, [address] [facility]

## 2022-02-10 NOTE — ASSESSMENT & PLAN NOTE
Lab Results   Component Value Date    EGFR 27 02/10/2022    EGFR 37 02/09/2022    EGFR 36 02/08/2022    CREATININE 2 43 (H) 02/10/2022    CREATININE 1 87 (H) 02/09/2022    CREATININE 1 91 (H) 02/08/2022     Likely stage 3B  Renal function has fluctuated over the past 3-4 months but it appears his baseline creatinine is around 2  Monitor renal function closely with diuresis and replete electrolytes  Currently creatinine is downtrending

## 2022-02-10 NOTE — PHYSICAL THERAPY NOTE
PHYSICAL THERAPY NOTE          Patient Name: Andrés Baez  [id number] Date: 2/10/2022     02/10/22 5841   Note Type   Note Type Treatment   Pain Assessment   Pain Assessment Tool 0-10   Pain Score No Pain   Restrictions/Precautions   Weight Bearing Precautions Per Order No   Other Precautions Cognitive;Multiple lines; Fall Risk;Pain   General   Response to Previous Treatment Patient with no complaints from previous session  Family/Caregiver Present No   Cognition   Overall Cognitive Status Impaired   Following Commands Follows one step commands with increased time or repetition   Subjective   Subjective Pt  reports he feels more stable with use of RW  Bed Mobility   Supine to Sit 4  Minimal assistance   Additional items Assist x 2;HOB elevated; Increased time required;Verbal cues   Sit to Supine 2  Maximal assistance   Additional items Assist x 2;Verbal cues;LE management   Additional Comments Max A for LE's into bed  Sat EOB with no c/o dizziness or lightheadedness  Required bed elevated to tx sit-stand due to body habitus unable to lean forward to push up and also utilizes lift chair at home  Transfers   Sit to Stand 4  Minimal assistance   Additional items Assist x 2   Stand to Sit 4  Minimal assistance   Additional items Assist x 2;Verbal cues   Stand pivot 4  Minimal assistance   Additional items Assist x 2   Toilet transfer 4  Minimal assistance   Additional items Assist x 1; Increased time required;Standard toilet   Ambulation/Elevation   Gait pattern Excessively slow; Short stride;Decreased foot clearance; Wide NICOLLE; Improper Weight shift   Gait Assistance 4  Minimal assist   Additional items Assist x 1;Verbal cues; Assist x 2   Assistive Device Rolling walker   Distance ~ 61' with SPC and 61' with RW   Balance   Static Sitting Fair -   Dynamic Sitting Poor +   Static Standing Peg 83   Ambulatory Fair  (RW)   Endurance Deficit   Endurance Deficit Yes   Activity Tolerance   Activity Tolerance Patient limited by fatigue   Assessment   Prognosis Fair   Problem List Decreased strength;Decreased endurance; Impaired balance;Decreased mobility; Impaired judgement;Decreased safety awareness; Obesity;Pain   Assessment Pt  seen for PT treatment session this date with interventions consisting of   bed mobility, transfers and  gait training w/ emphasis on improving pt's ability to ambulate  Pt  Requiring frequent cues for sequence and safety  In comparison to previous session, Pt  With improvements in activity tolerance  Trialed RW with improved balance and stability  No c/o dizziness or lightheadedness with ambulation  Pt is in need of continued activity in PT to improve strength balance endurance mobility transfers and ambulation with return to maximize LOF  From PT/mobility standpoint, recommendation at time of d/c would be post in order to promote return to [facility] and independence  The patient's AM-PAC Basic Mobility Inpatient Short Form Raw Score is 13  A Raw score of less than or equal to 16 suggests the patient may benefit from discharge to post-acute rehabilitation services  Please also refer to physical therapy recommendation for safe DC planning  Goals   LTG Expiration Date 02/23/22   Plan   Treatment/Interventions Functional transfer training;LE strengthening/ROM; Therapeutic exercise; Endurance training;Bed mobility;Gait training   Progress Slow progress, decreased activity tolerance   Recommendation   PT Discharge Recommendation Post acute rehabilitation services   AM-PAC Basic Mobility Inpatient   Turning in Bed Without Bedrails 2   Lying on Back to Sitting on Edge of Flat Bed 2   Moving Bed to Chair 2   Standing Up From Chair 2   Walk in Room 3   Climb 3-5 Stairs 2   Basic Mobility Inpatient Raw Score 13   Basic Mobility Standardized Score 33 99   Highest Level Of Mobility   [facility] Goal 4: Move to chair/commode   JH-HLM Highest Level of Mobility 7: Walk 25 feet or more   -HLM Goal Achieved Yes   Education   Education Provided Mobility training   Patient Reinforcement needed   End of Consult   Patient Position at End of Consult Other (comment); All needs within reach  (bathroom  PCA aware)   End of Consult Comments discussed POC with PT

## 2022-02-10 NOTE — PLAN OF CARE
Problem: OCCUPATIONAL THERAPY ADULT  Goal: Performs self-care activities at highest level of function for planned discharge setting  See evaluation for individualized goals  Description: Treatment Interventions: ADL retraining,Functional transfer training,UE strengthening/ROM,Endurance training,Patient/family training,Compensatory technique education,Energy conservation,Activityengagement          See flowsheet documentation for full assessment, interventions and recommendations  Outcome: Progressing  Note: Limitation: Decreased ADL status,Decreased UE strength,Decreased Safe judgement during ADL,Decreased cognition,Decreased endurance,Decreased self-care trans,Decreased high-level ADLs     Assessment: Patient participated in Skilled OT session this date with interventions consisting of ADL re training with the use of correct body mechnaics, Energy Conservation techniques, safety awareness and fall prevention techniques,  therapeutic activities to: increase activity tolerance, increase standing tolerance time with unilateral UE support to complete sink level ADLs, increase dynamic sit/ stand balance during functional activity  and increase OOB/ sitting tolerance   Co treatment with PTA secondary to complex medical condition of pt, A of 2 required to achieve and maintain transitional movements, requiring the need of skilled therapeutic intervention of 2 therapists to achieve delivery of services  Patient agreeable to OT treatment session, upon arrival patient was found supine in bed  Pt supine to seated EOB with min A x 2  STS with min A x 2  Pt performed functional mobility with SPC initially and min A x2  Pt encouraged to use RW and agreeable, min A and use of RW for functional mobility back to room  Pt stood for ~5 min with min A and RW to perform bathing and dressing  UB bathing with S, UB dressing min A  Lb bathing max A  Pt stand to sit with min A x 2  Seated EOb to supine max A x 2   Pt then requesting to go to bathroom  Supine to seated EOB min A  Pt performed functional mobiltiy to bathroom min A and use of RW  Able to stand for toileting ~ 3 minutes, then requesting to sit  Min A x 1 for toilet transfer  Pt requesting to sit on toile for a long time, RN notified, pt verbalized understanding to use call bell when done  Patient requiring verbal cues for safety  Patient continues to be functioning below baseline level, occupational performance remains limited secondary to factors listed above and increased risk for falls and injury  From OT standpoint, recommendation at time of d/c would be Post acute rehabilitation services  The patient's raw score on the AM-PAC Daily Activity inpatient short form is 16, standardized score is 35 96, less than 39 4  Patients at this level are likely to benefit from discharge to post-acute rehabilitation services  Please refer to the recommendation of the Occupational Therapist for safe discharge planning       OT Discharge Recommendation: Post acute rehabilitation services  OT - OK to Discharge: Yes (once medically cleared)     Justus Marx, OT

## 2022-02-10 NOTE — ASSESSMENT & PLAN NOTE
Slightly increased in size since December  Given drop in hemoglobin and recent trauma will continue to hold warfarin   Continue to check H&H frequently - did receive 3 units PRBCs so far  Surgical consult appreciated, no intervention needed at this time, abdominal binder ordered, pain meds PRN  Continue to monitor closely

## 2022-02-10 NOTE — PLAN OF CARE
Problem: PHYSICAL THERAPY ADULT  Goal: Performs mobility at highest level of function for planned discharge setting  See evaluation for individualized goals  Description: Treatment/Interventions: Functional transfer training,LE strengthening/ROM,Elevations,Therapeutic exercise,Endurance training,Bed mobility,Gait training          See flowsheet documentation for full assessment, interventions and recommendations  Outcome: Progressing  Note: Prognosis: Fair  Problem List: Decreased strength,Decreased endurance,Impaired balance,Decreased mobility,Impaired judgement,Decreased safety awareness,Obesity,Pain  Assessment: Pt  seen for PT treatment session this date with interventions consisting of   bed mobility, transfers and  gait training w/ emphasis on improving pt's ability to ambulate  Pt  Requiring frequent cues for sequence and safety  In comparison to previous session, Pt  With improvements in activity tolerance  Trialed RW with improved balance and stability  No c/o dizziness or lightheadedness with ambulation  Pt is in need of continued activity in PT to improve strength balance endurance mobility transfers and ambulation with return to maximize LOF  From PT/mobility standpoint, recommendation at time of d/c would be post in order to promote return to [facility] and independence  The patient's AM-PAC Basic Mobility Inpatient Short Form Raw Score is 13  A Raw score of less than or equal to 16 suggests the patient may benefit from discharge to post-acute rehabilitation services  Please also refer to physical therapy recommendation for safe DC planning  PT Discharge Recommendation: Post acute rehabilitation services          See flowsheet documentation for full assessment

## 2022-02-10 NOTE — ASSESSMENT & PLAN NOTE
CT chest showing concern for RLL pneumonia  procalcitonin x 2 positive at 0 5 however could be elevated due to CKD  Urinary antigens negative, discontinue azithromycin  Continue IV ceftriaxone - day #3  WBC remains normal, afebrile  Check sputum culture - pending    Added respiratory protocol, incentive spirometry

## 2022-02-10 NOTE — ASSESSMENT & PLAN NOTE
Rate initially controlled on toprol XL - now increased likely multifactorial in the setting of anemia, recent ambulation, pulmonary edema with wheezing, volume overload  Anticoagulated with coumadin at home - Holding coumadin for now due to anemia and abdominal wall hematoma   INR trending down at 1 5 today  Given concern over ongoing acute bleeding - will give 1 time dose of IV vitamin K now

## 2022-02-10 NOTE — PROGRESS NOTES
Progress Note - General Surgery   Lizette Kil 57 y o  male MRN: [id number]  Unit/Bed#:  Encounter: [id number]    Assessment:    Patient does admit less abdominal pain across his abdomen today  Passing gas, no bowel movement  Skin across the abdominal pannus erythematous, particularly warm to touch, blanches with palpation  Probable superficial panniculitis  No abdominal wall masses are palpable  Inspection of the genitalia and perineum does not show any evidence of necrotizing skin changes present per    Abdominal wall hematoma with large rectus sheath hematoma hospitalized in December of last year with slight increased size of hematoma the in comparison from previous CT scan imaging  Abdomen still is mildly tender across the entire girth  No guarding or rebound  Erythema across entire abdominal pannus blanches with palpation  Probable panniculitis, slowly improving  Low suspicion for abdominal compartment syndrome  Still at this point concern over the patient's anemia, it is uncertain whether not he is actively bleeding from the hematoma or if other sources need to be ruled out  The patient has developed acute kidney injury and this would prohibit obtaining a CTA of the abdomen and pelvis with IV contrast at this time  Hypotension early this morning 80/54  Patient tachycardic 119, temp 97 7°  Hemoglobin repeated today 7 7 (6 8 earlier)  Acute on chronic kidney disease, creatinine bumped up to 2 43 (1 87), GFR only 27  INR 1 53    Admitted with acute on chronic diastolic congestive heart failure, chronic atrial fibrillation, warfarin on hold  Anasarca the patient currently on IV diuretics  Known liver cirrhosis with ascites  Right lower lobe pneumonia discovered upon admission  History of chronic alcohol use  Profound morbid obesity, BMI 51 54      Incidental finding on CT scan of the neck showed mediastinal left neck base lymphadenopathy and recommended CT follow-up of the neck in 3 months after hospital discharge  Plan:  Personally discussed with Dr Dave Amaro, he will examine the patient later today  Continue to monitor hemoglobin, transfuse necessary  Abdominal girth is too large for abdominal binder  Continue to hold warfarin  Right lower lobe pneumonia suspected, procalcitonin level x2 positive  Anasarca, prior discharge weight was about 340 lb, now 380 lb  Patient has been on chronic diuretic regimen at home with Lasix and p r n  Metallic zone  Cirrhosis of liver with ascites, not thought to be amenable to IR paracentesis drainage with small amount of ascites behind colon  Chronic kidney disease stage III, creatinine today 1 87  History of chronic alcohol use  Severe morbid obesity, BMI 51 54     Plan:  Conservative management, no indication for any surgical intervention at this time  Abdominal girth too large for binder  Continue to hold warfarin, monitor INR  Apply ice bag p r n  comfort across mid abdomen  Analgesics as ordered p r n   IV fluids for hydration  Antibiotics, ceftriaxone 1 g Q 24 hours  Continue to monitor hemoglobin, transfuse for hemoglobin less than 7 5  Medical management per the attending hospitalist providers  If the patient becomes hemodynamically unstable or hemoglobin drops, then the patient should have CTA of the abdomen and pelvis performed  Follow-up CT of the neck in 3 months, primary care physician Dr Christa Vanegas  Subjective/Objective   Chief Complaint:  Patient was hypotensive early this morning  He admits less abdominal pain  Still redness across his abdomen  No fever or chills  Subjective:  69-year-old white male admitted with acute exacerbation CHF, generalized anasarca  Suspected panniculitis across abdomen  This morning he was noted to be hypotensive with a blood pressure of 80/54  He was also tachycardic  Patient denies any shortness of breath or chest pain    Redness across his abdominal span still present, he does admit less abdominal bloating and discomfort today  He is passing gas, still has not moved his bowels  His penis is imbedded in the scrotum, he does not admit any difficulty passing his urine  Patient has been tired confined to his bed  He has significant bilateral lower extremity edema with pronounced venous stasis also the patient does note bilateral scrotal swelling but not painful  Scheduled Meds:  Current Facility-Administered Medications   Medication Dose Route Frequency Provider Last Rate    acetaminophen  650 mg Oral Q6H PRN Jose Champagne MD      albuterol  2 5 mg Nebulization Q6H PRN Jose Champagne MD      bumetanide  1 mg/hr Intravenous Continuous Jose Champagne MD 1 mg/hr (02/10/22 1143)    cefTRIAXone  1,000 mg Intravenous Q24H Jose Champagne MD 1,000 mg (02/09/22 1731)    influenza vaccine  0 5 mL Intramuscular Prior to discharge Rory Merchant MD      metoprolol succinate  25 mg Oral BID Jose Champagne MD      oxyCODONE  10 mg Oral Q4H PRN Jose Champagne MD      oxyCODONE  5 mg Oral Q4H PRN Jose Champagne MD      potassium chloride  40 mEq Oral BID Jose Champagne MD      senna-docusate sodium  2 tablet Oral BID Jose Champagne MD      simethicone  80 mg Oral Q6H PRN Danielito Valdivia PA-C       Continuous Infusions:bumetanide, 1 mg/hr, Last Rate: 1 mg/hr (02/10/22 1143)      PRN Meds:   acetaminophen    albuterol    influenza vaccine    oxyCODONE    oxyCODONE    simethicone      Objective:     Blood pressure (!) 97/48, pulse 103, temperature 97 7 °F (36 5 °C), temperature source Temporal, resp  rate 16, height 6' (1 829 m), weight (!) 172 kg (380 lb), SpO2 99 %  ,Body mass index is 51 54 kg/m²        Intake/Output Summary (Last 24 hours) at 2/10/2022 1247  Last data filed at 2/10/2022 1243  Gross per 24 hour   Intake 1670 ml   Output --   Net 1670 ml       Invasive Devices  Report    Peripheral Intravenous Line            Peripheral IV 02/08/22 Right Antecubital 1 day    Peripheral IV 02/10/22 Right;Ventral (anterior) Forearm <1 day                Physical Exam:  Lying supine in bed  Profound obesity  Speech is clear  He is chronically ill-appearing  ENT clear, oral mucosa pink  Skin marbleized thickened skin lower extremities to the knees with venous stasis discoloration and significant 3+ pitting edema noted  Pulses are not palpable  Calf muscles are soft but the skin is tense  Heart distant, irregular rate and rhythm  Lungs seems to displayed good inspiratory effort, no rales or rhonchi heard  Abdomen very large abdominal pannus  The skin is erythematous across the entire abdomen, blanches with palpation which seems to be delayed refill  The abdomen is soft  There is no particular guarding or rebound noted  Minimal tenderness across the abdomen to superficial palpation, positive card at sign  No definite masses or bruising noted  Genitalia the penis is imbedded with in the lower abdominal pannus  The scrotum is enlarged and swollen, not particularly tender to touch  Inspection of the inguinal folds as well as the perineum does not show any evidence of any necrotic skin changes to suggest Onesimo's gangrene  Neurological exam unchanged, fully oriented  No tremor, moves all 4 extremities, ambulation not observed  Lab, Imaging and other studies:  I have personally reviewed pertinent lab results    , CBC:   Lab Results   Component Value Date    WBC 6 39 02/10/2022    HGB 7 7 (L) 02/10/2022    HCT 25 5 (L) 02/10/2022    MCV 92 02/10/2022     02/10/2022    MCH 27 8 02/10/2022    MCHC 30 2 (L) 02/10/2022    RDW 17 3 (H) 02/10/2022    MPV 10 2 02/10/2022    NRBC 0 02/10/2022   , CMP:   Lab Results   Component Value Date    SODIUM 130 (L) 02/10/2022    K 4 3 02/10/2022    CL 89 (L) 02/10/2022    CO2 32 02/10/2022    BUN 84 (H) 02/10/2022    CREATININE 2 43 (H) 02/10/2022    CALCIUM 8 1 (L) 02/10/2022    EGFR 27 02/10/2022     VTE Pharmacologic Prophylaxis: Reason for no pharmacologic prophylaxis Anemia  VTE Mechanical Prophylaxis: reason for no mechanical VTE prophylaxis SCDs lower extremities unable to be accommodated due to the patient's body habitus       Aye Thao PA-C

## 2022-02-10 NOTE — PROGRESS NOTES
Cardiology Progress Note - Jose Ortiz 57 y o  male MRN: [id number]    Unit/Bed#:  Encounter: [id number]    Assessment/Plan:  1  Acute on chronic diastolic congestive heart failure              - patient is significantly volume overloaded on exam, no standing weight done yet this admission   - dry weight likely around 300 lbs, patient was last 354 lbs on his home scale              - patient with poor urinary response with lasix gtt, will transition to a bumex gtt @ 1 mg/hr today              - he needs daily standing weights and strict I/O              - continue low sodium diet and fluid restriction              - monitor daily BMP     2  Chronic atrial fibrillation              - rates have been elevated secondary to missing doses of metoprolol in the setting of hypotension   - will make hold parameters for lenient               - continue metoprolol at current dose              - coumadin on hold given #3 and abdominal wall hematoma     3  Acute on chronic anemia              - patient is s/p 3 units of PRBC's   - repeat hemoglobin later this AM is 7 7   - patient has an abdominal wall hematoma   - GI and general surgery are following              - defer management to the above teams    4  Hypertension               - now with intermittent hypotension, continue to monitor BP closely    Subjective:   Patient seen and examined  He feels uncomfortable secondary to his edema and abdominal bloating  Continues with shortness of breath  Review of Systems   Constitutional: Negative for chills and fever  HENT: Negative  Cardiovascular: Positive for dyspnea on exertion and leg swelling  Negative for orthopnea, palpitations, paroxysmal nocturnal dyspnea and syncope  Respiratory: Negative for cough and shortness of breath  Gastrointestinal: Positive for bloating  Neurological: Negative for dizziness and light-headedness  All other systems reviewed and are negative        Objective:   Vitals: Blood pressure (!) 97/48, pulse (!) 119, temperature (!) 97 3 °F (36 3 °C), temperature source Temporal, resp  rate 21, height 6' (1 829 m), weight (!) 172 kg (380 lb), SpO2 95 %  , Body mass index is 51 54 kg/m² ,   Orthostatic Blood Pressures      Most Recent Value   Blood Pressure 97/48 filed at 02/10/2022 1020   Patient Position - Orthostatic VS Lying filed at 02/10/2022 9671         Systolic (96MJG), ICQ:55 , Min:80 , PIT:100     Diastolic (85XEY), UUF:97, Min:48, Max:71      Intake/Output Summary (Last 24 hours) at 2/10/2022 1052  Last data filed at 2/10/2022 0809  Gross per 24 hour   Intake 1430 ml   Output --   Net 1430 ml     Weight (last 2 days)     Date/Time Weight    02/08/22 1249 172 (380)            Telemetry Review: patient not on telemetry  EKG personally reviewed by Nicole Styles PA-C  Physical Exam  Vitals reviewed  Constitutional:       General: He is not in acute distress  Appearance: He is well-developed  He is obese  He is not diaphoretic  HENT:      Head: Normocephalic and atraumatic  Eyes:      Pupils: Pupils are equal, round, and reactive to light  Neck:      Vascular: No carotid bruit  Cardiovascular:      Rate and Rhythm: Normal rate  Rhythm irregularly irregular  Pulses:           Radial pulses are 2+ on the right side and 2+ on the left side  Heart sounds: S1 normal and S2 normal  Murmur heard  Systolic murmur is present  Pulmonary:      Effort: Pulmonary effort is normal  No respiratory distress  Breath sounds: Normal breath sounds  No wheezing or rales  Abdominal:      General: There is distension  Palpations: Abdomen is soft  Tenderness: There is no abdominal tenderness  Musculoskeletal:         General: Normal range of motion  Cervical back: Normal range of motion  Right lower leg: Edema (+2 bilateral lower extremity edema extending into thighs) present  Left lower leg: Edema present     Skin:     General: Skin is warm and dry       Findings: No erythema  Neurological:      General: No focal deficit present  Mental Status: He is alert and oriented to person, place, and time  Psychiatric:         Mood and Affect: Mood normal          Behavior: Behavior normal            Laboratory Results:        CBC with diff:   Results from last 7 days   Lab Units 02/10/22  0911 02/10/22  0053 02/09/22  1722 02/09/22  0913 02/09/22  0520 02/09/22  0145 02/08/22  1732 02/08/22  1256 02/08/22  1256   WBC Thousand/uL 6 39  --   --   --  5 50  --   --   --  5 58   HEMOGLOBIN g/dL 7 7* 6 8* 7 3* 7 6* 6 6* 6 3* 7 0*   < > 7 1*   HEMATOCRIT % 25 5* 22 2* 23 9* 24 5* 21 4* 19 9* 22 4*   < > 23 1*   MCV fL 92  --   --   --  90  --   --   --  90   PLATELETS Thousands/uL 192  --   --   --  178  --   --   --  184   MCH pg 27 8  --   --   --  27 7  --   --   --  27 7   MCHC g/dL 30 2*  --   --   --  30 8*  --   --   --  30 7*   RDW % 17 3*  --   --   --  17 7*  --   --   --  17 9*   MPV fL 10 2  --   --   --  10 8  --   --   --  10 4   NRBC AUTO /100 WBCs 0  --   --   --  0  --   --   --  0    < > = values in this interval not displayed  CMP:  Results from last 7 days   Lab Units 02/10/22  0636 02/09/22  0520 02/08/22  1256   POTASSIUM mmol/L 4 3 3 2* 2 5*   CHLORIDE mmol/L 89* 90* 89*   CO2 mmol/L 32 35* 37*   BUN mg/dL 84* 80* 82*   CREATININE mg/dL 2 43* 1 87* 1 91*   CALCIUM mg/dL 8 1* 8 0* 8 1*   AST U/L  --  22  --    ALT U/L  --  11*  --    ALK PHOS U/L  --  143*  --    EGFR ml/min/1 73sq m 27 37 36         BMP:  Results from last 7 days   Lab Units 02/10/22  0636 02/09/22  0520 02/08/22  1256   POTASSIUM mmol/L 4 3 3 2* 2 5*   CHLORIDE mmol/L 89* 90* 89*   CO2 mmol/L 32 35* 37*   BUN mg/dL 84* 80* 82*   CREATININE mg/dL 2 43* 1 87* 1 91*   CALCIUM mg/dL 8 1* 8 0* 8 1*       BNP: No results for input(s): BNP in the last 72 hours      Magnesium:   Results from last 7 days   Lab Units 02/09/22  0520 02/08/22  1256   MAGNESIUM mg/dL 2 2 2 1 Coags:   Results from last 7 days   Lab Units 02/10/22  0911 22  1256   PTT seconds  --  52*   INR  1 53* 1 63*       TSH:        Hemoglobin A1C       Lipid Profile:       Cardiac testing:   Results for orders placed during the hospital encounter of 21    Echo complete with contrast if indicated    Narrative  [address]  [address], [place] [address]  [phone]    Transthoracic Echocardiogram  2D, M-mode, Doppler, and Color Doppler    Study date:  2021    Patient: Rossi Galindo  MR number: [id number]  Account number: [de-identified]  : 1960  Age: 57 years  Gender: Male  Status: Inpatient  Location: Bedside  Height: 71 in  Weight: 350 lb  BP: 130/ 80 mmHg    Indications: shortness of breath    Diagnoses: 428 0 - CONGESTIVE HEART FAILURE    Sonographer:  Uriel Montelongo, CCT  Primary Physician:  Hermila King DO  Referring Physician:  Yang Michelle DO  Group:  [facility]  Interpreting Physician:  Tierra Roberts DO    SUMMARY    LEFT VENTRICLE:  Systolic function was normal  Ejection fraction was estimated to be 60 %  There were no regional wall motion abnormalities  Wall thickness was mildly increased  RIGHT VENTRICLE:  The ventricle was moderately dilated  Systolic function was mildly reduced  LEFT ATRIUM:  The atrium was mildly dilated  RIGHT ATRIUM:  The atrium was moderately dilated  MITRAL VALVE:  There was mild regurgitation  TRICUSPID VALVE:  There was moderate regurgitation  Estimated peak PA pressure was 52 mmHg  HISTORY: PRIOR HISTORY: CHF, morbid obesity    PROCEDURE: The procedure was performed at the bedside  This was a routine study  The transthoracic approach was used  The study included complete 2D imaging, M-mode, complete spectral Doppler, and color Doppler  The heart rate was 80 bpm,  at the start of the study   Echocardiographic views were limited due to poor patient compliance and poor acoustic window availability  This was a technically difficult study  LEFT VENTRICLE: Size was normal  Systolic function was normal  Ejection fraction was estimated to be 60 %  There were no regional wall motion abnormalities  Wall thickness was mildly increased  DOPPLER: The study was not technically  sufficient to allow evaluation of LV diastolic function  RIGHT VENTRICLE: The ventricle was moderately dilated  Systolic function was mildly reduced  LEFT ATRIUM: The atrium was mildly dilated  RIGHT ATRIUM: The atrium was moderately dilated  MITRAL VALVE: Valve structure was normal  There was normal leaflet separation  DOPPLER: The transmitral velocity was within the normal range  There was no evidence for stenosis  There was mild regurgitation  AORTIC VALVE: The valve was trileaflet  Leaflets exhibited normal thickness and normal cuspal separation  DOPPLER: Transaortic velocity was within the normal range  There was no evidence for stenosis  There was no regurgitation  TRICUSPID VALVE: The valve structure was normal  There was normal leaflet separation  DOPPLER: The transtricuspid velocity was within the normal range  There was no evidence for stenosis  There was moderate regurgitation  Estimated peak PA  pressure was 52 mmHg  PULMONIC VALVE: Leaflets exhibited normal thickness, no calcification, and normal cuspal separation  DOPPLER: The transpulmonic velocity was within the normal range  There was no regurgitation  PERICARDIUM: There was no pericardial effusion  The pericardium was normal in appearance  AORTA: The root exhibited normal size  SYSTEMIC VEINS: IVC: The inferior vena cava was not well visualized      SYSTEM MEASUREMENT TABLES    2D  %FS: 31 45 %  Ao Diam: 3 22 cm  EDV(Teich): 148 86 ml  EF(Teich): 58 74 %  ESV(Teich): 61 42 ml  IVSd: 1 51 cm  LA Area: 20 63 cm2  LA Diam: 5 65 cm  LVIDd: 5 52 cm  LVIDs: 3 79 cm  LVPWd: 1 08 cm  RA Area: 33 86 cm2  RWT: 0 39  SV(Teich): 87 44 ml    CW  RAP: 0 mmHg  TR Vmax: 3 47 m/s  TR maxP 33 mmHg    PW  E' Sept: 0 09 m/s  E/E' Sept: 13 35  MV A Ti: 0 48 m/s  MV Dec Callahan: 4 5 m/s2  MV DecT: 253 15 ms  MV E Ti: 1 14 m/s  MV E/A Ratio: 2 36  RVSP: 48 33 mmHg    [address] [facility]redited [facility]    Prepared and electronically signed by    Sacha Ordonez DO  Signed 2021 08:06:00    No results found for this or any previous visit  No results found for this or any previous visit  No results found for this or any previous visit        Meds/Allergies   all current active meds have been reviewed  Medications Prior to Admission   Medication    aspirin 81 mg chewable tablet    calcium carbonate (TUMS) 500 mg chewable tablet    senna-docusate sodium (SENOKOT S) 8 6-50 mg per tablet    warfarin (COUMADIN) 5 mg tablet    acetaminophen (TYLENOL) 325 mg tablet    citalopram (CeleXA) 10 mg tablet    glucose blood test strip    Lancets (freestyle) lancets    metoprolol succinate (TOPROL-XL) 25 mg 24 hr tablet    midodrine (PROAMATINE) 5 mg tablet    nicotine (NICODERM CQ) 7 mg/24hr TD 24 hr patch    polyethylene glycol (MIRALAX) 17 g packet       bumetanide, 1 mg/hr      Assessment:  Principal Problem:    Acute on chronic diastolic (congestive) heart failure (HCC)  Active Problems:    Cirrhosis of liver with ascites (HCC)    Acute on chronic anemia    Permanent atrial fibrillation (HCC)    Hyponatremia    CKD (chronic kidney disease)    Morbid (severe) obesity due to excess calories (HCC)    Abdominal wall hematoma    RLL pneumonia

## 2022-02-10 NOTE — OCCUPATIONAL THERAPY NOTE
Occupational Therapy Treatment Note     Patient Name: Maria Fernanda Bellamy  UFRQU'E Date: 2/10/2022  Problem List  Principal Problem:    Acute on chronic diastolic (congestive) heart failure (HCC)  Active Problems:    Cirrhosis of liver with ascites (HCC)    Acute on chronic anemia    Permanent atrial fibrillation (HCC)    Hyponatremia    CKD (chronic kidney disease)    Morbid (severe) obesity due to excess calories ([address] )    Abdominal wall hematoma    RLL pneumonia            02/10/22 0920   OT Last Visit   OT Visit Date 02/10/22   Note Type   Note Type Treatment   Restrictions/Precautions   Weight Bearing Precautions Per Order No   Other Precautions Cognitive;Multiple lines; Fall Risk;Pain   Pain Assessment   Pain Assessment Tool 0-10   Pain Score No Pain   ADL   Where Assessed Edge of bed   Grooming Assistance 5  Supervision/Setup   Grooming Deficit Supervision/safety; Wash/dry face   Grooming Comments Pt washed face with supervision while standing supported on RW  UB Bathing Assistance 5  Supervision/Setup   UB Bathing Deficit Verbal cueing;Supervision/safety; Increased time to complete   UB Bathing Comments Pt washed UB while standing supported on RW with supervision, v c  for safety awareness  LB Bathing Assistance 2  Maximal Assistance   LB Bathing Deficit Verbal cueing;Supervision/safety; Increased time to complete;Right upper leg;Left upper leg;Right lower leg including foot; Left lower leg including foot; Perineal area; Buttocks   LB Bathing Comments Pt max A to complete LB bathing, requiring A to wash buttock, groin while standing, and lower legs and feet  UB Dressing Assistance 4  Minimal Assistance   UB Dressing Deficit Supervision/safety; Increased time to complete; Thread RUE; Thread LUE   UB Dressing Comments Pt min A to don/doff gown while standing, requiring A to thread BUE  Pt encoraged to complete task while seated, pt declined   Pt educated on the importance of always be standing with a chair behind him when completing more strenuous functional tasks  Toileting Comments Pt requesting to use toilet in bathroom in room at end of session  Pt stood to use urinate with S and v c  to use grab bars  Pt then requesting to sit on toilet for a long period of time  Pt verbalizing he will use call bell when done  Bed Mobility   Supine to Sit 4  Minimal assistance   Additional items Assist x 2; Increased time required; Impulsive;Verbal cues   Sit to Supine 2  Maximal assistance   Additional items Assist x 2; Increased time required;Verbal cues;LE management   Additional Comments Pt supine in bed at start of session  Pt on RA  Vitals WNL  Pt min A x 2 to sit EOB  Once seated EOB, pt noted he is not comfortable and needs to be standing  Pt only able to sit EOB for ~2 minutes  Pt returned to supine in bed during session with max A x 2, but able to boost himself up in bed with v c  and bed trended  Once back in bed, pt requesting to use bathroom, and again min A to complet supine to seated EOB  Transfers   Sit to Stand 4  Minimal assistance   Additional items Assist x 2; Increased time required;Verbal cues   Stand to Sit 4  Minimal assistance   Additional items Assist x 2; Increased time required;Verbal cues   Stand pivot 4  Minimal assistance   Additional items Assist x 2; Increased time required;Verbal cues   Toilet transfer 4  Minimal assistance   Additional items Assist x 1; Increased time required;Verbal cues;Standard toilet   Additional Comments Pt supported on [facility] initially for functional transfers, but used RW for support later on in session  Unsteady on SPC  Once supported on RW, pt able to stand for ~5 minutes to complete bathing and dressing with min A and v c  for safety awareness  No LOB  Functional Mobility   Functional Mobility 4  Minimal assistance   Additional Comments Pt performed functional mobility in hallway initially with SPC and A x 2  Pt unsteady with SPC   Encouraged to use RW, agreeable with education  Pt then min A x 1 to complete functional mobility back to room with RW  Vitals WNL post functional mobility  Additional items Rolling walker   Toilet Transfers   Toilet Transfer From Rolling walker   Toilet Transfer Type To   Toilet Transfer to Standard toilet   Toilet Transfer Technique Ambulating   Toilet Transfers Minimal assistance   Toilet Transfers Comments v c  for safety awareness, increased time to complete   Cognition   Overall Cognitive Status Impaired   Arousal/Participation Alert; Responsive; Cooperative   Attention Attends with cues to redirect   Orientation Level Oriented X4   Memory Decreased recall of precautions   Following Commands Follows one step commands with increased time or repetition   Comments Pt agreeable to OT treatment and pleasant  Pt impulsive and with poor safety awarness, requiring pt to be reminded of safety tips multiple times throughout session  Pt also required increased time to attend to directions  Activity Tolerance   Activity Tolerance Patient tolerated treatment well   Medical Staff Made Aware JULIA Fox verbalized pt appropriate for OT treatment  PTA Beckie Chinchilla present for co-treatment due to pt's medical complexity, requiring A x 2 to perform some functional movements  Assessment   Assessment Patient participated in Skilled OT session this date with interventions consisting of ADL re training with the use of correct body mechnaics, Energy Conservation techniques, safety awareness and fall prevention techniques,  therapeutic activities to: increase activity tolerance, increase standing tolerance time with unilateral UE support to complete sink level ADLs, increase dynamic sit/ stand balance during functional activity  and increase OOB/ sitting tolerance   Co treatment with PTA secondary to complex medical condition of pt, A of 2 required to achieve and maintain transitional movements, requiring the need of skilled therapeutic intervention of 2 therapists to achieve delivery of services  Patient agreeable to OT treatment session, upon arrival patient was found supine in bed  Pt supine to seated EOB with min A x 2  STS with min A x 2  Pt performed functional mobility with SPC initially and min A x2  Pt encouraged to use RW and agreeable, min A and use of RW for functional mobility back to room  Pt stood for ~5 min with min A and RW to perform bathing and dressing  UB bathing with S, UB dressing min A  Lb bathing max A  Pt stand to sit with min A x 2  Seated EOb to supine max A x 2  Pt then requesting to go to bathroom  Supine to seated EOB min A  Pt performed functional mobiltiy to bathroom min A and use of RW  Able to stand for toileting ~ 3 minutes, then requesting to sit  Min A x 1 for toilet transfer  Pt requesting to sit on toile for a long time, RN notified, pt verbalized understanding to use call bell when done  Patient requiring verbal cues for safety  Patient continues to be functioning below baseline level, occupational performance remains limited secondary to factors listed above and increased risk for falls and injury  From OT standpoint, recommendation at time of d/c would be Post acute rehabilitation services  The patient's raw score on the AM-PAC Daily Activity inpatient short form is 16, standardized score is 35 96, less than 39 4  Patients at this level are likely to benefit from discharge to post-acute rehabilitation services  Please refer to the recommendation of the Occupational Therapist for safe discharge planning     Plan   Treatment Interventions ADL retraining;Functional transfer training   Goal Expiration Date 02/23/22   OT Treatment Day 1   OT Frequency 3-5x/wk   Recommendation   OT Discharge Recommendation Post acute rehabilitation services   OT - OK to Discharge Yes  (once medically cleared)   Additional Comments  Pt left seated on toilet per his request, JULIA Ceja made aware, pt verbalized he would be sitting on toilet for awhile and would use call bell when done      AM-PAC Daily Activity Inpatient   Lower Body Dressing 2   Bathing 2   Toileting 2   Upper Body Dressing 3   Grooming 3   Eating 4   Daily Activity Raw Score 16   Daily Activity Standardized Score (Calc for Raw Score >=11) 35 96   AM-PAC Applied Cognition Inpatient   Following a Speech/Presentation 4   Understanding Ordinary Conversation 4   Taking Medications 4   Remembering Where Things Are Placed or Put Away 4   Remembering List of 4-5 Errands 3   Taking Care of Complicated Tasks 2   Applied Cognition Raw Score 21   Applied Cognition Standardized Score 44 3          Justus Marx, OT

## 2022-02-10 NOTE — PLAN OF CARE
Problem: Potential for Falls  Goal: Patient will remain free of falls  Description: INTERVENTIONS:  - Educate patient/family on patient safety including physical limitations  - Instruct patient to call for assistance with activity   - Consult OT/PT to assist with strengthening/mobility   - Keep Call bell within reach  - Keep bed low and locked with side rails adjusted as appropriate  - Keep care items and personal belongings within reach  - Initiate and maintain comfort rounds  - Make Fall Risk Sign visible to staff  - Offer Toileting every 1-2 Hours, in advance of need  - Initiate/Maintain bed and chair alarm  - Obtain necessary fall risk management equipment: fall socks  - Apply yellow socks and bracelet for high fall risk patients  - Consider moving patient to room near nurses station  Outcome: Progressing     Problem: MOBILITY - ADULT  Goal: Maintain or return to baseline ADL function  Description: INTERVENTIONS:  - Educate patient/family on patient safety including physical limitations  - Instruct patient to call for assistance with activity   - Consult OT/PT to assist with strengthening/mobility   - Keep Call bell within reach  - Keep bed low and locked with side rails adjusted as appropriate  - Keep care items and personal belongings within reach  - Initiate and maintain comfort rounds  - Make Fall Risk Sign visible to staff  - Offer Toileting every 1-2 Hours, in advance of need  - Initiate/Maintain bed and chair alarm  - Obtain necessary fall risk management equipment: fall socks  - Apply yellow socks and bracelet for high fall risk patients  - Consider moving patient to room near nurses station  Outcome: Progressing  Goal: Maintains/Returns to pre admission functional level  Description: INTERVENTIONS:  - Perform BMAT or MOVE assessment daily    - Set and communicate daily mobility goal to care team and patient/family/caregiver     - Collaborate with rehabilitation services on mobility goals if consulted  - Perform Range of Motion 3 times a day  - Reposition patient every 2 hours    - Dangle patient 3 times a day  - Stand patient 3 times a day  - Ambulate patient 3 times a day  - Out of bed to chair 3 times a day   - Out of bed for meals 3 times a day  - Out of bed for toileting  - Record patient progress and toleration of activity level   Outcome: Progressing     Problem: PAIN - ADULT  Goal: Verbalizes/displays adequate comfort level or baseline comfort level  Description: Interventions:  - Encourage patient to monitor pain and request assistance  - Assess pain using appropriate pain scale  - Administer analgesics based on type and severity of pain and evaluate response  - Implement non-pharmacological measures as appropriate and evaluate response  - Consider cultural and social influences on pain and pain management  - Notify physician/advanced practitioner if interventions unsuccessful or patient reports new pain  Outcome: Progressing     Problem: INFECTION - ADULT  Goal: Absence or prevention of progression during hospitalization  Description: INTERVENTIONS:  - Assess and monitor for signs and symptoms of infection  - Monitor lab/diagnostic results  - Monitor all insertion sites, i e  indwelling lines, tubes, and drains  - Monitor endotracheal if appropriate and nasal secretions for changes in amount and color  - [place] appropriate cooling/warming therapies per order  - Administer medications as ordered  - Instruct and encourage patient and family to use good hand hygiene technique  - Identify and instruct in appropriate isolation precautions for identified infection/condition  Outcome: Progressing     Problem: SAFETY ADULT  Goal: Patient will remain free of falls  Description: INTERVENTIONS:  - Educate patient/family on patient safety including physical limitations  - Instruct patient to call for assistance with activity   - Consult OT/PT to assist with strengthening/mobility   - Keep Call bell within reach  - Keep bed low and locked with side rails adjusted as appropriate  - Keep care items and personal belongings within reach  - Initiate and maintain comfort rounds  - Make Fall Risk Sign visible to staff  - Offer Toileting every 1-2 Hours, in advance of need  - Initiate/Maintain bed and chair alarm  - Obtain necessary fall risk management equipment: fall socks  - Apply yellow socks and bracelet for high fall risk patients  - Consider moving patient to room near nurses station  Outcome: Progressing  Goal: Maintain or return to baseline ADL function  Description: INTERVENTIONS:  - Educate patient/family on patient safety including physical limitations  - Instruct patient to call for assistance with activity   - Consult OT/PT to assist with strengthening/mobility   - Keep Call bell within reach  - Keep bed low and locked with side rails adjusted as appropriate  - Keep care items and personal belongings within reach  - Initiate and maintain comfort rounds  - Make Fall Risk Sign visible to staff  - Offer Toileting every 1-2 Hours, in advance of need  - Initiate/Maintain bed and chair alarm  - Obtain necessary fall risk management equipment: fall socks  - Apply yellow socks and bracelet for high fall risk patients  - Consider moving patient to room near nurses station  Outcome: Progressing  Goal: Maintains/Returns to pre admission functional level  Description: INTERVENTIONS:  - Perform BMAT or MOVE assessment daily    - Set and communicate daily mobility goal to care team and patient/family/caregiver  - Collaborate with rehabilitation services on mobility goals if consulted  - Perform Range of Motion 3 times a day  - Reposition patient every 2 hours    - Dangle patient 3 times a day  - Stand patient 3 times a day  - Ambulate patient 3 times a day  - Out of bed to chair 3 times a day   - Out of bed for meals 3 times a day  - Out of bed for toileting  - Record patient progress and toleration of activity level Outcome: Progressing     Problem: DISCHARGE PLANNING  Goal: Discharge to home or other facility with appropriate resources  Description: INTERVENTIONS:  - Identify barriers to discharge w/patient and caregiver  - Arrange for needed discharge resources and transportation as appropriate  - Identify discharge learning needs (meds, wound care, etc )  - Arrange for interpretive services to assist at discharge as needed  - Refer to Case Management Department for coordinating discharge planning if the patient needs post-hospital services based on physician/advanced practitioner order or complex needs related to functional status, cognitive ability, or social support system  Outcome: Progressing     Problem: Knowledge Deficit  Goal: Patient/family/caregiver demonstrates understanding of disease process, treatment plan, medications, and discharge instructions  Description: Complete learning assessment and assess knowledge base    Interventions:  - Provide teaching at level of understanding  - Provide teaching via preferred learning methods  Outcome: Progressing     Problem: Prexisting or High Potential for Compromised Skin Integrity  Goal: Skin integrity is maintained or improved  Description: INTERVENTIONS:  - Identify patients at risk for skin breakdown  - Assess and monitor skin integrity  - Assess and monitor nutrition and hydration status  - Monitor labs   - Assess for incontinence   - Turn and reposition patient  - Assist with mobility/ambulation  - Relieve pressure over bony prominences  - Avoid friction and shearing  - Provide appropriate hygiene as needed including keeping skin clean and dry  - Evaluate need for skin moisturizer/barrier cream  - Collaborate with interdisciplinary team   - Patient/family teaching  - Consider wound care consult   Outcome: Progressing     Problem: CARDIOVASCULAR - ADULT  Goal: Maintains optimal cardiac output and hemodynamic stability  Description: INTERVENTIONS:  - Monitor I/O, vital signs and rhythm  - Monitor for S/S and trends of decreased cardiac output  - Administer and titrate ordered vasoactive medications to optimize hemodynamic stability  - Assess quality of pulses, skin color and temperature  - Assess for signs of decreased coronary artery perfusion  - Instruct patient to report change in severity of symptoms  Outcome: Progressing  Goal: Absence of cardiac dysrhythmias or at baseline rhythm  Description: INTERVENTIONS:  - Continuous cardiac monitoring, vital signs, obtain 12 lead EKG if ordered  - Administer antiarrhythmic and heart rate control medications as ordered  - Monitor electrolytes and administer replacement therapy as ordered  Outcome: Progressing     Problem: RESPIRATORY - ADULT  Goal: Achieves optimal ventilation and oxygenation  Description: INTERVENTIONS:  - Assess for changes in respiratory status  - Assess for changes in mentation and behavior  - Position to facilitate oxygenation and minimize respiratory effort  - Oxygen administered by appropriate delivery if ordered  - Initiate smoking cessation education as indicated  - Encourage broncho-pulmonary hygiene including cough, deep breathe, Incentive Spirometry  - Assess the need for suctioning and aspirate as needed  - Assess and instruct to report SOB or any respiratory difficulty  - Respiratory Therapy support as indicated  Outcome: Progressing     Problem: METABOLIC, FLUID AND ELECTROLYTES - ADULT  Goal: Electrolytes maintained within normal limits  Description: INTERVENTIONS:  - Monitor labs and assess patient for signs and symptoms of electrolyte imbalances  - Administer electrolyte replacement as ordered  - Monitor response to electrolyte replacements, including repeat lab results as appropriate  - Instruct patient on fluid and nutrition as appropriate  Outcome: Progressing  Goal: Fluid balance maintained  Description: INTERVENTIONS:  - Monitor labs   - Monitor I/O and WT  - Instruct patient on fluid and nutrition as appropriate  - Assess for signs & symptoms of volume excess or deficit  Outcome: Progressing  Goal: Glucose maintained within target range  Description: INTERVENTIONS:  - Monitor Blood Glucose as ordered  - Assess for signs and symptoms of hyperglycemia and hypoglycemia  - Administer ordered medications to maintain glucose within target range  - Assess nutritional intake and initiate nutrition service referral as needed  Outcome: Progressing     Problem: HEMATOLOGIC - ADULT  Goal: Maintains hematologic stability  Description: INTERVENTIONS  - Assess for signs and symptoms of bleeding or hemorrhage  - Monitor labs  - Administer supportive blood products/factors as ordered and appropriate  Outcome: Progressing

## 2022-02-10 NOTE — PLAN OF CARE
Problem: Potential for Falls  Goal: Patient will remain free of falls  Description: INTERVENTIONS:  - Educate patient/family on patient safety including physical limitations  - Instruct patient to call for assistance with activity   - Consult OT/PT to assist with strengthening/mobility   - Keep Call bell within reach  - Keep bed low and locked with side rails adjusted as appropriate  - Keep care items and personal belongings within reach  - Initiate and maintain comfort rounds  - Make Fall Risk Sign visible to staff  - Offer Toileting every 1-2 Hours, in advance of need  - Initiate/Maintain bed and chair alarm  - Obtain necessary fall risk management equipment: fall socks  - Apply yellow socks and bracelet for high fall risk patients  - Consider moving patient to room near nurses station  Outcome: Progressing     Problem: MOBILITY - ADULT  Goal: Maintain or return to baseline ADL function  Description: INTERVENTIONS:  - Educate patient/family on patient safety including physical limitations  - Instruct patient to call for assistance with activity   - Consult OT/PT to assist with strengthening/mobility   - Keep Call bell within reach  - Keep bed low and locked with side rails adjusted as appropriate  - Keep care items and personal belongings within reach  - Initiate and maintain comfort rounds  - Make Fall Risk Sign visible to staff  - Offer Toileting every 1-2 Hours, in advance of need  - Initiate/Maintain bed and chair alarm  - Obtain necessary fall risk management equipment: fall socks  - Apply yellow socks and bracelet for high fall risk patients  - Consider moving patient to room near nurses station  Outcome: Progressing  Goal: Maintains/Returns to pre admission functional level  Description: INTERVENTIONS:  - Perform BMAT or MOVE assessment daily    - Set and communicate daily mobility goal to care team and patient/family/caregiver     - Collaborate with rehabilitation services on mobility goals if consulted  - Perform Range of Motion 2 times a day  - Reposition patient every 2 hours    - Dangle patient 2 times a day  - Stand patient 2 times a day  - Ambulate patient 2 times a day  - Out of bed to chair 2 times a day   - Out of bed for meals 2 times a day  - Out of bed for toileting  - Record patient progress and toleration of activity level   Outcome: Progressing     Problem: PAIN - ADULT  Goal: Verbalizes/displays adequate comfort level or baseline comfort level  Description: Interventions:  - Encourage patient to monitor pain and request assistance  - Assess pain using appropriate pain scale  - Administer analgesics based on type and severity of pain and evaluate response  - Implement non-pharmacological measures as appropriate and evaluate response  - Consider cultural and social influences on pain and pain management  - Notify physician/advanced practitioner if interventions unsuccessful or patient reports new pain  Outcome: Progressing     Problem: INFECTION - ADULT  Goal: Absence or prevention of progression during hospitalization  Description: INTERVENTIONS:  - Assess and monitor for signs and symptoms of infection  - Monitor lab/diagnostic results  - Monitor all insertion sites, i e  indwelling lines, tubes, and drains  - Monitor endotracheal if appropriate and nasal secretions for changes in amount and color  - [place] appropriate cooling/warming therapies per order  - Administer medications as ordered  - Instruct and encourage patient and family to use good hand hygiene technique  - Identify and instruct in appropriate isolation precautions for identified infection/condition  Outcome: Progressing     Problem: SAFETY ADULT  Goal: Patient will remain free of falls  Description: INTERVENTIONS:  - Educate patient/family on patient safety including physical limitations  - Instruct patient to call for assistance with activity   - Consult OT/PT to assist with strengthening/mobility   - Keep Call bell within reach  - Keep bed low and locked with side rails adjusted as appropriate  - Keep care items and personal belongings within reach  - Initiate and maintain comfort rounds  - Make Fall Risk Sign visible to staff  - Offer Toileting every 1-2 Hours, in advance of need  - Initiate/Maintain bed and chair alarm  - Obtain necessary fall risk management equipment: fall socks  - Apply yellow socks and bracelet for high fall risk patients  - Consider moving patient to room near nurses station  Outcome: Progressing  Goal: Maintain or return to baseline ADL function  Description: INTERVENTIONS:  - Educate patient/family on patient safety including physical limitations  - Instruct patient to call for assistance with activity   - Consult OT/PT to assist with strengthening/mobility   - Keep Call bell within reach  - Keep bed low and locked with side rails adjusted as appropriate  - Keep care items and personal belongings within reach  - Initiate and maintain comfort rounds  - Make Fall Risk Sign visible to staff  - Offer Toileting every 1-2 Hours, in advance of need  - Initiate/Maintain bed and chair alarm  - Obtain necessary fall risk management equipment: fall socks  - Apply yellow socks and bracelet for high fall risk patients  - Consider moving patient to room near nurses station  Outcome: Progressing  Goal: Maintains/Returns to pre admission functional level  Description: INTERVENTIONS:  - Perform BMAT or MOVE assessment daily    - Set and communicate daily mobility goal to care team and patient/family/caregiver  - Collaborate with rehabilitation services on mobility goals if consulted  - Perform Range of Motion 2 times a day  - Reposition patient every 2 hours    - Dangle patient 2 times a day  - Stand patient 2 times a day  - Ambulate patient 2 times a day  - Out of bed to chair 2 times a day   - Out of bed for meals 2 times a day  - Out of bed for toileting  - Record patient progress and toleration of activity level Outcome: Progressing     Problem: DISCHARGE PLANNING  Goal: Discharge to home or other facility with appropriate resources  Description: INTERVENTIONS:  - Identify barriers to discharge w/patient and caregiver  - Arrange for needed discharge resources and transportation as appropriate  - Identify discharge learning needs (meds, wound care, etc )  - Arrange for interpretive services to assist at discharge as needed  - Refer to Case Management Department for coordinating discharge planning if the patient needs post-hospital services based on physician/advanced practitioner order or complex needs related to functional status, cognitive ability, or social support system  Outcome: Progressing     Problem: Knowledge Deficit  Goal: Patient/family/caregiver demonstrates understanding of disease process, treatment plan, medications, and discharge instructions  Description: Complete learning assessment and assess knowledge base    Interventions:  - Provide teaching at level of understanding  - Provide teaching via preferred learning methods  Outcome: Progressing     Problem: Prexisting or High Potential for Compromised Skin Integrity  Goal: Skin integrity is maintained or improved  Description: INTERVENTIONS:  - Identify patients at risk for skin breakdown  - Assess and monitor skin integrity  - Assess and monitor nutrition and hydration status  - Monitor labs   - Assess for incontinence   - Turn and reposition patient  - Assist with mobility/ambulation  - Relieve pressure over bony prominences  - Avoid friction and shearing  - Provide appropriate hygiene as needed including keeping skin clean and dry  - Evaluate need for skin moisturizer/barrier cream  - Collaborate with interdisciplinary team   - Patient/family teaching  - Consider wound care consult   Outcome: Progressing     Problem: CARDIOVASCULAR - ADULT  Goal: Maintains optimal cardiac output and hemodynamic stability  Description: INTERVENTIONS:  - Monitor I/O, vital signs and rhythm  - Monitor for S/S and trends of decreased cardiac output  - Administer and titrate ordered vasoactive medications to optimize hemodynamic stability  - Assess quality of pulses, skin color and temperature  - Assess for signs of decreased coronary artery perfusion  - Instruct patient to report change in severity of symptoms  Outcome: Progressing  Goal: Absence of cardiac dysrhythmias or at baseline rhythm  Description: INTERVENTIONS:  - Continuous cardiac monitoring, vital signs, obtain 12 lead EKG if ordered  - Administer antiarrhythmic and heart rate control medications as ordered  - Monitor electrolytes and administer replacement therapy as ordered  Outcome: Progressing     Problem: RESPIRATORY - ADULT  Goal: Achieves optimal ventilation and oxygenation  Description: INTERVENTIONS:  - Assess for changes in respiratory status  - Assess for changes in mentation and behavior  - Position to facilitate oxygenation and minimize respiratory effort  - Oxygen administered by appropriate delivery if ordered  - Initiate smoking cessation education as indicated  - Encourage broncho-pulmonary hygiene including cough, deep breathe, Incentive Spirometry  - Assess the need for suctioning and aspirate as needed  - Assess and instruct to report SOB or any respiratory difficulty  - Respiratory Therapy support as indicated  Outcome: Progressing     Problem: METABOLIC, FLUID AND ELECTROLYTES - ADULT  Goal: Electrolytes maintained within normal limits  Description: INTERVENTIONS:  - Monitor labs and assess patient for signs and symptoms of electrolyte imbalances  - Administer electrolyte replacement as ordered  - Monitor response to electrolyte replacements, including repeat lab results as appropriate  - Instruct patient on fluid and nutrition as appropriate  Outcome: Progressing  Goal: Fluid balance maintained  Description: INTERVENTIONS:  - Monitor labs   - Monitor I/O and WT  - Instruct patient on fluid and nutrition as appropriate  - Assess for signs & symptoms of volume excess or deficit  Outcome: Progressing  Goal: Glucose maintained within target range  Description: INTERVENTIONS:  - Monitor Blood Glucose as ordered  - Assess for signs and symptoms of hyperglycemia and hypoglycemia  - Administer ordered medications to maintain glucose within target range  - Assess nutritional intake and initiate nutrition service referral as needed  Outcome: Progressing     Problem: HEMATOLOGIC - ADULT  Goal: Maintains hematologic stability  Description: INTERVENTIONS  - Assess for signs and symptoms of bleeding or hemorrhage  - Monitor labs  - Administer supportive blood products/factors as ordered and appropriate  Outcome: Progressing

## 2022-02-11 PROBLEM — N17.9 ACUTE RENAL FAILURE SUPERIMPOSED ON STAGE 3B CHRONIC KIDNEY DISEASE (HCC): Status: ACTIVE | Noted: 2021-01-01

## 2022-02-11 PROBLEM — N18.32 ACUTE RENAL FAILURE SUPERIMPOSED ON STAGE 3B CHRONIC KIDNEY DISEASE (HCC): Status: ACTIVE | Noted: 2021-01-01

## 2022-02-11 PROBLEM — R33.9 URINARY RETENTION: Status: ACTIVE | Noted: 2022-01-01

## 2022-02-11 NOTE — CONSULTS
Interventional Radiology  Consultation 2/11/2022     Consults  Moni Yao   1960   [id number]      Assessment/Plan:  Plan retrograde urethra gram and fluoroscopically guided urinary catheter placement    Medical Problems             Problem List     * (Principal) Acute on chronic diastolic (congestive) heart failure (HCC)    Wt Readings from Last 3 Encounters:   02/11/22 (!) 168 kg (369 lb 11 4 oz)   12/29/21 (!) 152 kg (336 lb)   12/14/21 (!) 152 kg (335 lb 1 6 oz)                 Lymphedema    Chronic venous insufficiency    Varicose veins of both lower extremities with inflammation    Hypertensive heart and chronic kidney disease with heart failure and stage 1 through stage 4 chronic kidney disease, or chronic kidney disease ([place] [address] )    Overview Deleted 12/8/2021  1:50 PM by Peyotn Bajwa,             Lab Results   Component Value Date    EGFR 19 02/11/2022    EGFR 27 02/10/2022    EGFR 37 02/09/2022    CREATININE 3 21 (H) 02/11/2022    CREATININE 2 43 (H) 02/10/2022    CREATININE 1 87 (H) 02/09/2022         STEFF (obstructive sleep apnea)    Pleural effusion, right    Overview Signed 7/1/2021  1:21 PM by Claris Pallas, PA-C     Diagnosis: Right pleural effusion   Procedure: Right thoracoscopic complete decortication on 6/2/21  Pathology: All cultures were negative            SOB (shortness of breath)    Cirrhosis of liver with ascites ([facility] )    Overview Deleted 12/8/2021  1:52 PM by Peyton Bajwa, DO            Tobacco abuse    Acute on chronic anemia    Thrombocytopenia (HCC)    Hypokalemia    Other cirrhosis of liver ([facility] )    Encounter for screening for other viral diseases     Lactic acidosis    Permanent atrial fibrillation ([facility] )    Overview Deleted 12/8/2021  1:53 PM by Peyton Bajwa, DO            Diverticulosis of colon    Lesion of spleen    Chronic diastolic CHF (congestive heart failure) ([facility] )    Overview Deleted 12/8/2021  1:55 PM by Peyton Bajwa,             Wt Readings from Last 3 Encounters:   02/11/22 (!) 168 kg (369 lb 11 4 oz)   12/29/21 (!) 152 kg (336 lb)   12/14/21 (!) 152 kg (335 lb 1 6 oz)                 Tobacco use    Hyponatremia    Overview Signed 12/8/2021  1:48 PM by Yuly Claros DO     - improving   - monitor in the setting of ongoing diuresis         Constipation    Pulmonary hypertension ([facility] )    Vitamin D deficiency    Atelectasis    Anemia    Hypercalcemia    Acute kidney injury superimposed on chronic kidney disease ([facility] )    Lab Results   Component Value Date    EGFR 19 02/11/2022    EGFR 27 02/10/2022    EGFR 37 02/09/2022    CREATININE 3 21 (H) 02/11/2022    CREATININE 2 43 (H) 02/10/2022    CREATININE 1 87 (H) 02/09/2022         Hypertensive nephrosclerosis    CKD (chronic kidney disease)    Lab Results   Component Value Date    EGFR 19 02/11/2022    EGFR 27 02/10/2022    EGFR 37 02/09/2022    CREATININE 3 21 (H) 02/11/2022    CREATININE 2 43 (H) 02/10/2022    CREATININE 1 87 (H) 02/09/2022         Hypotension    Overview Deleted 12/8/2021  1:56 PM by Yuly Claros DO            Right wrist pain    Overview Signed 6/1/2021  3:30 PM by Philip Urena DO     Patient with pain in his wrist after waking up on 05/31  Has been 10/10 at times  X-ray negative for osseous abnormalities  Wrist splint with minimal relief  Patient unable to receive NSAIDs in the setting of renal disease      Plan:  -Trial 1 time dose of prednisone 40 mg  -Continue monitor         Morbid (severe) obesity due to excess calories (HCC)    Smoking    Iron deficiency    Chronic right-sided congestive heart failure (HCC)    Severe sepsis with septic shock (HCC)    Panniculitis    Hypoalbuminemia    NSVT (nonsustained ventricular tachycardia) ([facility])    Pancytopenia (HCC)    Hyperphosphatemia    Elevated alkaline phosphatase level    Neutropenia ([facility] )    Long term (current) use of anticoagulants    Acute blood loss anemia    Abdominal wall hematoma    Overview Deleted 12/8/2021  1:57 PM by Zenaida Scott Leisa Hughes,             Abnormal CT of the chest    Overview Signed 2/8/2022  2:56 PM by Maureen Arthur DO     Mediastinal/left neck base lymphadenopathy, consider follow-up neck and chest CT in 3 months to evaluate for persistence (noted 2/8/22)         RLL pneumonia                  Subjective:     Patient ID: Janie Remy is a 57 y o  male  History of Present Illness  Bumex drip and urinary retention  Failed bedside coudet catheter placement  Review of Systems      Past Medical History:   Diagnosis Date    A-fib [facility])     Acute renal failure superimposed on stage 3b chronic kidney disease ([facility] ) 03/15/2021    Cardiac disease     Chronic combined systolic (congestive) and diastolic (congestive) heart failure (HCC)     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 ascites (HCC)     Class 3 severe obesity due to excess calories with serious comorbidity and body mass index (BMI) of 45 0 to 49 9 in adult [facility]) 10/17/2018    Diabetes mellitus ([facility] )     Dilated cardiomyopathy ([place] [address] )     History of echocardiogram 11/24/2014    EF 0 30 (30%), Likely mod LV systolic dysfunction  Likely RV dysfunction as well   History of Lexiscan MPI 02/19/2016    EF 0 43 (43%), no prior MI or ischemia   Hx of echocardiogram 04/28/2017    Normal EF, Normal LV systolic function  Mild concentric LV hypertrophy  Mild mitral and tricuspid regurg      Hx: recurrent pneumonia     Hypertension     Hypokalemia 09/04/2020    Long term (current) use of anticoagulants     Morbid (severe) obesity due to excess calories (HCC)     Neuropathy     Pleural effusion on right     Stage 3 chronic kidney disease (HCC)         Past Surgical History:   Procedure Laterality Date    HERNIA REPAIR      IR CHEST TUBE CHECK/CHANGE/REPOSITION/UPSIZE  5/27/2021    IR CHEST TUBE PLACEMENT  5/22/2021    IR PELVIC ANGIOGRAM  12/4/2021    IR THORACENTESIS  8/25/2020    IR THORACENTESIS  2/23/2021    LUNG DECORTICATION Right 6/2/2021    Procedure: DECORTICATION LUNG;  Surgeon: Fede Mota MD;  Location: [facility];  Service: Thoracic    SPLENECTOMY, TOTAL      THORACOSCOPY VIDEO ASSISTED SURGERY (VATS) Right 6/2/2021    Procedure: THORACOSCOPY VIDEO ASSISTED SURGERY (VATS);   Surgeon: Fede Mota MD;  Location: [facility];  Service: Thoracic    VASCULAR SURGERY Right     leg        Social History     Tobacco Use   Smoking Status Current Some Day Smoker    Packs/day: 0 25    Types: Cigarettes   Smokeless Tobacco Never Used        Social History     Substance and Sexual Activity   Alcohol Use Yes    Comment: 4 or 5 drinks weekly/bloody chani's        Social History     Substance and Sexual Activity   Drug Use Never        No Known Allergies    Current Facility-Administered Medications   Medication Dose Route Frequency Provider Last Rate Last Admin    acetaminophen (TYLENOL) tablet 650 mg  650 mg Oral Q6H PRN Jose Garcia MD        albuterol inhalation solution 2 5 mg  2 5 mg Nebulization Q6H PRN Jose Garcia MD        aluminum-magnesium hydroxide-simethicone (MYLANTA) oral suspension 30 mL  30 mL Oral Q4H PRN Jose Garcia MD        bumetanide (BUMEX) 12 5 mg infusion 50 mL  1 mg/hr Intravenous Continuous Jose Garica MD 4 mL/hr at 02/11/22 0355 1 mg/hr at 02/11/22 0355    cefTRIAXone (ROCEPHIN) IVPB (premix in dextrose) 1,000 mg 50 mL  1,000 mg Intravenous Q24H Jose Garcia  mL/hr at 02/10/22 1729 1,000 mg at 02/10/22 1729    influenza vaccine, recombinant, quadrivalent (FLUBLOK) IM injection 0 5 mL  0 5 mL Intramuscular Prior to discharge Linh Jean Baptiste MD        metoprolol succinate (TOPROL-XL) 24 hr tablet 25 mg  25 mg Oral BID IRAM WhitmanC        oxyCODONE (ROXICODONE) immediate release tablet 10 mg  10 mg Oral Q4H PRN Jose Garcia MD   10 mg at 02/11/22 0332    oxyCODONE (ROXICODONE) IR tablet 5 mg  5 mg Oral Q4H PRN Jose Garcia MD   5 mg at 02/10/22 1914    pantoprazole (PROTONIX) EC tablet 40 mg  40 mg Oral Early Morning Jose Montez MD   40 mg at 02/11/22 9847    senna-docusate sodium (SENOKOT S) 8 6-50 mg per tablet 2 tablet  2 tablet Oral BID Sarbjit Khalil MD   2 tablet at 02/11/22 0842          Objective:    Vitals:    02/11/22 0554 02/11/22 0600 02/11/22 0836 02/11/22 0853   BP:  98/57 (!) 92/48 (!) 92/48   BP Location:    Left arm   Pulse:  (!) 116 (!) 109 100   Resp:  (!) 31  21   Temp:    (!) 96 7 °F (35 9 °C)   TempSrc:    Tympanic   SpO2:  94%  99%   Weight: (!) 168 kg (369 lb 11 4 oz)      Height:            Physical Exam  5 minutes spent coordinating care with the clinical team    Lab Results   Component Value Date     (H) 12/16/2015      Lab Results   Component Value Date    WBC 6 53 02/11/2022    HGB 7 2 (L) 02/11/2022    HCT 23 9 (L) 02/11/2022    MCV 92 02/11/2022     02/11/2022     Lab Results   Component Value Date    INR 1 23 (H) 02/11/2022    INR 1 53 (H) 02/10/2022    INR 1 63 (H) 02/08/2022    PROTIME 14 9 (H) 02/11/2022    PROTIME 17 6 (H) 02/10/2022    PROTIME 18 5 (H) 02/08/2022     Lab Results   Component Value Date    PTT 52 (H) 02/08/2022         I have personally reviewed pertinent imaging and laboratory results  Code Status: Level 1 - Full Code  Advance Directive and Living Will:      Power of :    POLST:      IR has been consulted to evaluate the patient, determine the appropriate procedure, and whether or not a procedure can and should be performed  Thank you for allowing me to participate in the care of [employer]  Please don't hesitate to call, text, email, or TigerText with any questions  This text is generated with voice recognition software  There may be translation, syntax,  or grammatical errors  If you have any questions, please contact the dictating provider

## 2022-02-11 NOTE — NURSING NOTE
Dr Macarena Davenport made aware pt has not voided since he arrived to the CCU  Pt was OOB standing with graduate attempting to void, also sat on BSC with still no success  Attempted to bladder scan pt at this time, which was unsuccessful due to his anatomy

## 2022-02-11 NOTE — PLAN OF CARE
Problem: Potential for Falls  Goal: Patient will remain free of falls  Description: INTERVENTIONS:  - Educate patient/family on patient safety including physical limitations  - Instruct patient to call for assistance with activity   - Consult OT/PT to assist with strengthening/mobility   - Keep Call bell within reach  - Keep bed low and locked with side rails adjusted as appropriate  - Keep care items and personal belongings within reach  - Initiate and maintain comfort rounds  - Make Fall Risk Sign visible to staff  - Offer Toileting every 1-2 Hours, in advance of need  - Initiate/Maintain bed and chair alarm  - Obtain necessary fall risk management equipment: fall socks  - Apply yellow socks and bracelet for high fall risk patients  - Consider moving patient to room near nurses station  Outcome: Progressing     Problem: MOBILITY - ADULT  Goal: Maintain or return to baseline ADL function  Description: INTERVENTIONS:  - Educate patient/family on patient safety including physical limitations  - Instruct patient to call for assistance with activity   - Consult OT/PT to assist with strengthening/mobility   - Keep Call bell within reach  - Keep bed low and locked with side rails adjusted as appropriate  - Keep care items and personal belongings within reach  - Initiate and maintain comfort rounds  - Make Fall Risk Sign visible to staff  - Offer Toileting every 1-2 Hours, in advance of need  - Initiate/Maintain bed and chair alarm  - Obtain necessary fall risk management equipment: fall socks  - Apply yellow socks and bracelet for high fall risk patients  - Consider moving patient to room near nurses station  Outcome: Progressing  Goal: Maintains/Returns to pre admission functional level  Description: INTERVENTIONS:  - Perform BMAT or MOVE assessment daily    - Set and communicate daily mobility goal to care team and patient/family/caregiver     - Collaborate with rehabilitation services on mobility goals if consulted  - Reposition patient every 2 hours    - Stand patient 4 times a day  - Ambulate patient 1 times a day  - Out of bed to chair 3 times a day   - Out of bed for meals 3 times a day  - Out of bed for toileting  - Record patient progress and toleration of activity level   Outcome: Progressing     Problem: PAIN - ADULT  Goal: Verbalizes/displays adequate comfort level or baseline comfort level  Description: Interventions:  - Encourage patient to monitor pain and request assistance  - Assess pain using appropriate pain scale  - Administer analgesics based on type and severity of pain and evaluate response  - Implement non-pharmacological measures as appropriate and evaluate response  - Consider cultural and social influences on pain and pain management  - Notify physician/advanced practitioner if interventions unsuccessful or patient reports new pain  Outcome: Progressing     Problem: INFECTION - ADULT  Goal: Absence or prevention of progression during hospitalization  Description: INTERVENTIONS:  - Assess and monitor for signs and symptoms of infection  - Monitor lab/diagnostic results  - Monitor all insertion sites, i e  indwelling lines, tubes, and drains  - Administer medications as ordered  - Instruct and encourage patient and family to use good hand hygiene technique  - Identify and instruct in appropriate isolation precautions for identified infection/condition  Outcome: Progressing     Problem: SAFETY ADULT  Goal: Patient will remain free of falls  Description: INTERVENTIONS:  - Educate patient/family on patient safety including physical limitations  - Instruct patient to call for assistance with activity   - Consult OT/PT to assist with strengthening/mobility   - Keep Call bell within reach  - Keep bed low and locked with side rails adjusted as appropriate  - Keep care items and personal belongings within reach  - Initiate and maintain comfort rounds  - Make Fall Risk Sign visible to staff  - Offer Toileting every 1-2 Hours, in advance of need  - Initiate/Maintain bed and chair alarm  - Obtain necessary fall risk management equipment: fall socks  - Apply yellow socks and bracelet for high fall risk patients  - Consider moving patient to room near nurses station  Outcome: Progressing  Goal: Maintain or return to baseline ADL function  Description: INTERVENTIONS:  - Educate patient/family on patient safety including physical limitations  - Instruct patient to call for assistance with activity   - Consult OT/PT to assist with strengthening/mobility   - Keep Call bell within reach  - Keep bed low and locked with side rails adjusted as appropriate  - Keep care items and personal belongings within reach  - Initiate and maintain comfort rounds  - Make Fall Risk Sign visible to staff  - Offer Toileting every 1-2 Hours, in advance of need  - Initiate/Maintain bed and chair alarm  - Obtain necessary fall risk management equipment: fall socks  - Apply yellow socks and bracelet for high fall risk patients  - Consider moving patient to room near nurses station  Outcome: Progressing  Goal: Maintains/Returns to pre admission functional level  Description: INTERVENTIONS:  - Perform BMAT or MOVE assessment daily    - Set and communicate daily mobility goal to care team and patient/family/caregiver  - Collaborate with rehabilitation services on mobility goals if consulted  - Reposition patient every 2 hours    - Stand patient 4 times a day  - Ambulate patient 1 times a day  - Out of bed to chair 3 times a day   - Out of bed for meals 3 times a day  - Out of bed for toileting  - Record patient progress and toleration of activity level   Outcome: Progressing     Problem: DISCHARGE PLANNING  Goal: Discharge to home or other facility with appropriate resources  Description: INTERVENTIONS:  - Identify barriers to discharge w/patient and caregiver  - Arrange for needed discharge resources and transportation as appropriate  - Identify discharge learning needs (meds, wound care, etc )  - Refer to Case Management Department for coordinating discharge planning if the patient needs post-hospital services based on physician/advanced practitioner order or complex needs related to functional status, cognitive ability, or social support system  Outcome: Progressing     Problem: Knowledge Deficit  Goal: Patient/family/caregiver demonstrates understanding of disease process, treatment plan, medications, and discharge instructions  Description: Complete learning assessment and assess knowledge base    Interventions:  - Provide teaching at level of understanding  - Provide teaching via preferred learning methods  Outcome: Progressing     Problem: Prexisting or High Potential for Compromised Skin Integrity  Goal: Skin integrity is maintained or improved  Description: INTERVENTIONS:  - Identify patients at risk for skin breakdown  - Assess and monitor skin integrity  - Assess and monitor nutrition and hydration status  - Monitor labs   - Assess for incontinence   - Turn and reposition patient  - Assist with mobility/ambulation  - Relieve pressure over bony prominences  - Avoid friction and shearing  - Provide appropriate hygiene as needed including keeping skin clean and dry  - Evaluate need for skin moisturizer/barrier cream  - Collaborate with interdisciplinary team   - Patient/family teaching  - Consider wound care consult   Outcome: Progressing     Problem: CARDIOVASCULAR - ADULT  Goal: Maintains optimal cardiac output and hemodynamic stability  Description: INTERVENTIONS:  - Monitor I/O, vital signs and rhythm  - Monitor for S/S and trends of decreased cardiac output  - Administer and titrate ordered vasoactive medications to optimize hemodynamic stability  - Assess quality of pulses, skin color and temperature  - Assess for signs of decreased coronary artery perfusion  - Instruct patient to report change in severity of symptoms  Outcome: Progressing  Goal: Absence of cardiac dysrhythmias or at baseline rhythm  Description: INTERVENTIONS:  - Continuous cardiac monitoring, vital signs, obtain 12 lead EKG if ordered  - Administer antiarrhythmic and heart rate control medications as ordered  - Monitor electrolytes and administer replacement therapy as ordered  Outcome: Progressing     Problem: RESPIRATORY - ADULT  Goal: Achieves optimal ventilation and oxygenation  Description: INTERVENTIONS:  - Assess for changes in respiratory status  - Assess for changes in mentation and behavior  - Position to facilitate oxygenation and minimize respiratory effort  - Oxygen administered by appropriate delivery if ordered  - Initiate smoking cessation education as indicated  - Encourage broncho-pulmonary hygiene including cough, deep breathe, Incentive Spirometry  - Assess the need for suctioning and aspirate as needed  - Assess and instruct to report SOB or any respiratory difficulty  - Respiratory Therapy support as indicated  Outcome: Progressing     Problem: METABOLIC, FLUID AND ELECTROLYTES - ADULT  Goal: Electrolytes maintained within normal limits  Description: INTERVENTIONS:  - Monitor labs and assess patient for signs and symptoms of electrolyte imbalances  - Administer electrolyte replacement as ordered  - Monitor response to electrolyte replacements, including repeat lab results as appropriate  - Instruct patient on fluid and nutrition as appropriate  Outcome: Progressing  Goal: Fluid balance maintained  Description: INTERVENTIONS:  - Monitor labs   - Monitor I/O and WT  - Instruct patient on fluid and nutrition as appropriate  - Assess for signs & symptoms of volume excess or deficit  Outcome: Progressing  Goal: Glucose maintained within target range  Description: INTERVENTIONS:  - Monitor Blood Glucose as ordered  - Assess for signs and symptoms of hyperglycemia and hypoglycemia  - Administer ordered medications to maintain glucose within target range  - Assess nutritional intake and initiate nutrition service referral as needed  Outcome: Progressing     Problem: HEMATOLOGIC - ADULT  Goal: Maintains hematologic stability  Description: INTERVENTIONS  - Assess for signs and symptoms of bleeding or hemorrhage  - Monitor labs  - Administer supportive blood products/factors as ordered and appropriate  Outcome: Progressing

## 2022-02-11 NOTE — ASSESSMENT & PLAN NOTE
Rate initially controlled on toprol XL - now increased likely multifactorial in the setting of anemia, recent ambulation, pulmonary edema with wheezing, volume overload  Anticoagulated with coumadin at home - Holding coumadin for now due to anemia and abdominal wall hematoma   INR trending down 1 5 --> 1 23  Given concern over ongoing acute bleeding - received 1 time dose of IV vitamin K 2/10/22  Hgb now remains stable

## 2022-02-11 NOTE — PROGRESS NOTES
Progress Note- Para Erp 57 y o  male MRN: [id number]    Unit/Bed#:  Encounter: [id number]      Assessment and Plan:    Manjinder Chang is a 79-year-old male past medical history of congestive heart failure, anemia, AFib on Coumadin and cirrhosis that presented to the emergency department with increased generalized body edema, weakness and frequent falls and was found to have acute on chronic diastolic congestive heart failure in right lower lobe pneumonia  1  Ascites  2  Cirrhosis   Patient with a history of cirrhosis with etiology suspected related to fatty liver disease versus alcohol use however he was recommended to undergo transjugular biopsy with portal pressure measurements which was not yet completed  MELD 27 in the setting of recent Coumadin use    Ascites     Patient initially found to have mild volume ascites centered within the upper abdomen on CT however IR was consulted and reports unable to complete due to location and amount of ascites  He continues with significant volume overload and bodily edema  He states that he has not urinated since Wednesday and Gann catheter placement has been unsuccessful at this point  He reports abdominal pain and pressure  Abdomen distended on exam   Patient currently on a Bumex drip  Will need to maximize diuretic therapy at discharge  - continue low-sodium diet and fluid restriction   - strict I&O own  - serial abdominal exams     Esophageal varices   Patient's last EGD in October 2020 revealed no varices and he is due for repeat surveillance this year  Patient currently denies any upper GI symptoms or melena at this time  In the setting of patient's anemia, could consider EGD if hemoglobin continues to decline and fecal occult was positive    If patient improves, would recommend outpatient follow-up for EGD   - outpatient follow-up for esophageal varices unless patient hemoglobin continues to decline and fecal occult positive    Hepatic encephalopathy  No history  Patient is alert and oriented x4 on exam, no asterixis noted  [address]    Last ultrasound of liver in August of this year revealed no suspicious masses last AFP in May normal at 2 7  Will continue to monitor outpatient  Transplant candidacy   Patient is not a candidate due to comorbidities and elevated BMI  3  Anemia    Patient has a history of chronic anemia with baseline over the past year in 7-9 range  He has dropped as low as 6 3 during this admission  He has had 2 units of packed red blood cells transfused over his admission  His most recent hemoglobin this morning is 7 2  He denies any hematemesis, hematochezia or melena  Patient's last EGD and colonoscopy were in October 2020  His EGD is unremarkable and colonoscopy revealed multiple small and large AVMs treated with APC, internal and external hemorrhoids  Patient has been on Coumadin for AFib but this is currently on hold  Patient also has a large rectus sheath hematoma from recent falls at home  Patient has occult blood ordered but not yet collected  If patient is hemoglobin continues to drop could consider EGD and colonoscopy inpatient  - trend H&H   - transfuse as needed   - monitor stool color and consistency   - follow-up stool for occult blood   - hold anticoagulation   - could consider scopes inpatient if hemoglobin continues to drop      ______________________________________________________________________    Subjective:     Patient reports that he is feeling mildly improved however states he has not urinated since Wednesday and continues with abdominal discomfort      Medication Administration - last 24 hours from 02/10/2022 1109 to 02/11/2022 1109       Date/Time Order Dose Route Action Action by     02/11/2022 0836 metoprolol succinate (TOPROL-XL) 24 hr tablet 25 mg 25 mg Oral Not Given Wei Koroma RN     02/10/2022 1731 metoprolol succinate (TOPROL-XL) 24 hr tablet 25 mg 25 mg Oral Given Thang Domingo RN     02/11/2022 1568 senna-docusate sodium (SENOKOT S) 8 6-50 mg per tablet 2 tablet 2 tablet Oral Given Xander Dietrich RN     02/10/2022 1730 senna-docusate sodium (SENOKOT S) 8 6-50 mg per tablet 2 tablet 2 tablet Oral Given Thang Domingo RN     02/10/2022 1729 cefTRIAXone (ROCEPHIN) IVPB (premix in dextrose) 1,000 mg 50 mL 1,000 mg Intravenous [address] Thang Domingo RN     02/10/2022 1114 cefTRIAXone (ROCEPHIN) IVPB (premix in dextrose) 1,000 mg 50 mL   Intravenous GUSTAVO Sanchez MD     02/10/2022 1114 influenza vaccine, recombinant, quadrivalent (FLUBLOK) IM injection 0 5 mL   Intramuscular GUSTAVO Sanchez MD     02/10/2022 1914 oxyCODONE (ROXICODONE) IR tablet 5 mg 5 mg Oral Given Thang Domingo RN     02/10/2022 1114 oxyCODONE (ROXICODONE) IR tablet 5 mg   Oral GUSTAVO Sanchez MD     02/10/2022 1114 acetaminophen (TYLENOL) tablet 650 mg   Oral GUSTAVO Sanchez MD     02/11/2022 5472 oxyCODONE (ROXICODONE) immediate release tablet 10 mg 10 mg Oral Given Brett Hoffmann RN     02/10/2022 1146 oxyCODONE (ROXICODONE) immediate release tablet 10 mg 10 mg Oral Given Thang Domingo RN     02/10/2022 1114 oxyCODONE (ROXICODONE) immediate release tablet 10 mg   Oral GUSTAVO Moreira MD     02/10/2022 1114 potassium chloride (K-DUR,KLOR-CON) CR tablet 40 mEq   Oral GUSTAVO Moreira MD     02/11/2022 0836 albumin human (FLEXBUMIN) 25 % injection 25 g 0 g Intravenous Stopped Xander Dietrich RN     02/10/2022 1201 albumin human (FLEXBUMIN) 25 % injection 25 g 25 g Intravenous [address] Thang Domingo RN     02/10/2022 1114 albumin human (FLEXBUMIN) 25 % injection 25 g   Intravenous GUSTAVO Moreira MD     02/10/2022 1141 phytonadione (AQUA-MEPHYTON) 10 mg/mL 10 mg in sodium chloride 0 9 % 50 mL IVPB 10 mg Intravenous [address] Thang Domingo RN     02/10/2022 1114 phytonadione (AQUA-MEPHYTON) 10 mg/mL 10 mg in sodium chloride 0 9 % 50 mL IVPB   Intravenous MAR Unhold Jose Cuenca MD     02/11/2022 0355 bumetanide (BUMEX) 12 5 mg infusion 50 mL 1 mg/hr Intravenous [facility]     02/10/2022 1143 bumetanide (BUMEX) 12 5 mg infusion 50 mL 1 mg/hr Intravenous [address] Nida Valenzuela RN     02/10/2022 8535 simethicone (MYLICON) chewable tablet 80 mg 80 mg Oral Given Nida Valenzuela RN     02/11/2022 9255 pantoprazole (PROTONIX) EC tablet 40 mg 40 mg Oral Given Fatemeh Pop RN     02/10/2022 1730 pantoprazole (PROTONIX) EC tablet 40 mg 40 mg Oral Given Nida Valenzuela RN     02/10/2022 2025 calcium carbonate (TUMS) chewable tablet 750 mg 750 mg Oral Given Fatemeh Pop RN          Objective:     Vitals: Blood pressure (!) 92/48, pulse 100, temperature (!) 96 7 °F (35 9 °C), temperature source Tympanic, resp  rate 21, height 6' (1 829 m), weight (!) 168 kg (369 lb 11 4 oz), SpO2 99 %  ,Body mass index is 50 14 kg/m²  Intake/Output Summary (Last 24 hours) at 2/11/2022 1109  Last data filed at 2/11/2022 0901  Gross per 24 hour   Intake 1566 53 ml   Output --   Net 1566 53 ml       Physical Exam:   PHYSICAL EXAMINATION:    General Appearance:   Alert, cooperative, no distress   HEENT:  Normocephalic, atraumatic, anicteric  Neck supple, symmetrical, trachea midline  Lungs:   Equal chest rise and unlabored breathing, normal effort, no coughing  Cardiovascular:   No visualized JVD  Abdomen:   Distended  Skin:   No jaundice, rashes, or lesions  Musculoskeletal:   Normal range of motion visualized  Psych:  Normal affect and normal insight  Neuro:  Alert and appropriate  Invasive Devices  Report    Peripheral Intravenous Line            Peripheral IV 02/08/22 Right Antecubital 2 days    Peripheral IV 02/10/22 Right;Ventral (anterior) Forearm <1 day                Lab Results:  No results displayed because visit has over 200 results            Imaging Studies: I have personally reviewed pertinent imaging studies

## 2022-02-11 NOTE — CASE MANAGEMENT
Case Management Progress Note    Patient name Ele Garcia  Location / MRN [id number]  : 1960 Date 2022       LOS (days): 3  Geometric Mean LOS (GMLOS) (days): 3 80  Days to GMLOS:0 8        OBJECTIVE:        Current admission status: Inpatient  Preferred Pharmacy:   [id number] [address], [place] [id number]  Phone: [phone] Fax: [phone]    Primary Care Provider: Livier Culver DO    Primary Insurance: [facility]  Secondary Insurance:     PROGRESS NOTE:Continuing to follow pt  Pt was moved to CCU, hypotension; Hgb dropped to 7 2;  crt 3 21; urinary retention  Plans at this time are home on dc with OP follow up and resumption of [facility] (RN and PT)  Cm will continue to follow and assist in dc planning

## 2022-02-11 NOTE — PHYSICAL THERAPY NOTE
PHYSICAL THERAPY NOTE          Patient Name: Fara Delcid  IUSHONUMargaritoI Date: 2/11/2022     Chart reviewed  Attempted to see x2 today  Pt refusing for AM treatment and asking to come back for PM treatment  Upon arrival for PM treatment pt off floor for testing  Will continue to follow and attempt to see as schedule allows and as pt is appropriate      Gigi Burr, PTA

## 2022-02-11 NOTE — PROCEDURES
INTERVENTIONAL RADIOLOGY PROCEDURE NOTE    Date: 2/11/2022    Procedure: IR OTHER     Preoperative diagnosis:   1  Hypokalemia    2  Multiple falls    3  Generalized edema    4  Chronic anticoagulation    5  Generalized weakness    6  Abnormal CT of the chest    7  Abdominal wall hematoma    8  Acute on chronic diastolic (congestive) heart failure ([address] )    9  Ascites    10  Acute on chronic renal failure (HCC)    11  Urinary retention         Postoperative diagnosis: Same  Surgeon: Nancy Rosas MD     Assistant: None  No qualified resident was available  Blood loss:  None    Specimens:  None    Findings:  Unable to place urinary catheter at bedside  Retracted penis  Unable to visualize meatus  The area is prepped and draped in the usual sterile fashion  I could not identified glands or meatus  Contrast was injected into the inverted penis  About 5 cm deep to the orifice the glans was opacified  I could not malik the glans with any mechanical method  A catheter placed would coil between the glans and foreskin, and not enter the meatus  With the area surrounding the glans opacified, I used a glide wire and catheter, with fluoroscopic guidance, to cannulate the meatus  I advanced a wire through the urethra and into the bladder  Contrast was injected  Retrograde urethra gram was performed  I then placed an [address] catheter over the wire  I could not advance the wire beyond the meatus  Potential exists for meatal stenosis  I exchanged the catheter, over the wire, for a 7 Malay vascular sheath  I then placed a 2nd maine wire adjacent  This was a stiff Glidewire  I attempted to advance the 25 [place] catheter over the 2 wires  Catheter would not advance beyond the meatus  The smallest catheter we had in house was a 12 [place]  This would not enter the meatus  I could not directly visualize the meatus or alter the course of the catheter      Eventually, I placed an all-purpose drain over 1 of the glide wires  Retaining loop was coiled in the bladder and it was secured  There was good flow of urine  Successful placement of trans urethral urinary catheter despite inability to visualize the meatus  The current catheter has a string activated retaining system which will need to be D activated prior to removal     Complications: None immediate      Anesthesia: local

## 2022-02-11 NOTE — ASSESSMENT & PLAN NOTE
Wt Readings from Last 3 Encounters:   02/11/22 (!) 168 kg (369 lb 11 4 oz)   12/29/21 (!) 152 kg (336 lb)   12/14/21 (!) 152 kg (335 lb 1 6 oz)     With diffuse anasarca  Prior discharge weight is closer to 340lbs, approximately 30-40 lbs up from dry weight  Chronic diuretic regimen is lasix 120mg BID with PRN metalozone  Echo 2/21 EF 60%, PA pressure 52 mm Hg, no significant valvulopathy  Received 40mg IV lasix on admission then transitioned to lasix GTT 10mg/hr on 2/8  Cardiology input appreciated  Low salt diet with 1800cc fluid restriction  Continue intake and output, daily weights  Consider addition of midodrine vs  PRN albumin with asymptomatic hypotension noted  Consider PRN zaroxylyn

## 2022-02-11 NOTE — ASSESSMENT & PLAN NOTE
Due a history of alcohol use  Now with abdominal ascites on exam   Consulted IR for possible paracentesis and labs  Low volume ascites noted, not worth risk  GI consult appreciated, recommend outpatient EGD   May need to consider possible inpatient EGD, colonoscopy if hemoccult testing positive  D/w GI  MELD 24 (INR is in the setting of coumadin use however)

## 2022-02-11 NOTE — CONSULTS
Consultation - Nephrology   Lizette Kil 57 y o  male MRN: [id number]  Unit/Bed#:  Encounter: [id number]      Assessment and Plan    1  Hyponatremia  · In the setting of volume overload, urinary retention  Serum sodium 127 millimole per L on 02/11/2022  Request urine osmolality and urine sodium  Continue 1 8 L fluid restriction, loop diuretic  · Hold metolazone  Thiazide and thiazide like diuretics contraindicated  2  Acute kidney injury, present on admission, superimposed on chronic kidney disease   With presenting creatinine 1 9 mg/dL worsened to 3 2 mg/dL in the setting of diuresis   Etiology:  - Suspect multifactorial in the setting of hemodynamic perturbations of hypotension and tachycardia, urinary retention, possibly cardiorenal syndrome   Plan:    o Request urine studies  o Urinary catheter to be placed by IR  Strict I&Os  o Trend renal function  Continue to provide supportive care    o Optimize hemodynamics  Maintain mean arterial pressure greater than 65 mmHg    o Renally dose medications  o Avoid nephrotoxins  Please discuss with renal any diuretics or possible nephrotoxic agents, such as NSAIDs or IV contrast  Recommend avoidance of PPIs in favor of H2 blocker, if appropriate for clinical scenario    o Monitor for urinary retention- strict I&Os, record bladder scan PVRs and follow urinary retention protocol  3  Acute on chronic diastolic congestive heart failure  · Strict I&Os  Standing scale daily weights  2 g sodium restriction  1 8 L Fluid restriction  · On home furosemide 80 mg twice daily metolazone  Would consider transition to torsemide at discharge  · Bumetanide drip for mL/hr per Cardiology  4  Acute on chronic anemia  · Known abdominal wall hematoma   General surgery to follow  Status post 1 unit PRBC since admission  5  Hypertension, now hypotensive  · Request midodrine 5 mg 3 times daily to assist with facilitation of diuresis    6  Urinary retention  · IR to place urinary catheter  7  Lymphedema  8  Atrial fibrillation  · On metoprolol, Coumadin on hold given abdominal wall hematoma  9  Obstructive sleep apnea  · Recommend CPAP    Thank you for allowing us to participate in the care of your patient  Please feel free to contact us regarding the care of this patient, or any other questions/concerns that may be applicable      Patient Active Problem List   Diagnosis    Acute on chronic diastolic (congestive) heart failure (HCC)    Lymphedema    Chronic venous insufficiency    Varicose veins of both lower extremities with inflammation    Hypertensive heart and chronic kidney disease with heart failure and stage 1 through stage 4 chronic kidney disease, or chronic kidney disease (HCC)    STEFF (obstructive sleep apnea)    Pleural effusion, right    SOB (shortness of breath)    Cirrhosis of liver with ascites (HCC)    Tobacco abuse    Acute on chronic anemia    Thrombocytopenia (HCC)    Hypokalemia    Other cirrhosis of liver ([facility] )    Encounter for screening for other viral diseases     Lactic acidosis    Permanent atrial fibrillation ([facility] )    Diverticulosis of colon    Lesion of spleen    Chronic diastolic CHF (congestive heart failure) (HCC)    Tobacco use    Hyponatremia    Constipation    Pulmonary hypertension (HCC)    Vitamin D deficiency    Atelectasis    Anemia    Hypercalcemia    Acute kidney injury superimposed on chronic kidney disease ([address] )    Hypertensive nephrosclerosis    CKD (chronic kidney disease)    Hypotension    Right wrist pain    Morbid (severe) obesity due to excess calories (HCC)    Smoking    Iron deficiency    Chronic right-sided congestive heart failure (HCC)    Severe sepsis with septic shock (HCC)    Panniculitis    Hypoalbuminemia    NSVT (nonsustained ventricular tachycardia) (HCC)    Pancytopenia (HCC)    Hyperphosphatemia    Elevated alkaline phosphatase level    Neutropenia ([address] )    Long term (current) use of anticoagulants    Acute blood loss anemia    Abdominal wall hematoma    Abnormal CT of the chest    RLL pneumonia       History of Present Illness     Physician Requesting Consult: Elio Collier MD    Reason for Consult / Principal Problem: Acute kidney injury / Hyponatremia    Hx and PE limited by: none    HPI: Lizette Boyd is a 57y o  year old male who presents to [facility] emergency department on 02/08/2022 with progressive swelling  Nephrology is consulted for evaluation management of acute kidney injury with peak creatinine 3 2 mg/dL on 02/11/2022 and hyponatremia with serum sodium 127 millimole per L  He reports occasional shortness of breath with exertion  He reports adherence with home furosemide 80 mg twice daily and metolazone 5 mg which he takes almost daily prior to the 1st dose of furosemide  He tells me that typically these water pills increase his urinary output but that he has not urinated since Wednesday, 02/09/2022  He is followed by Dr Goyo Ugarte  History obtained from chart review and the patient  Review of Systems    Severe swelling  A complete 10 point review of systems was performed and is otherwise negative  Historical Information   Past Medical History:   Diagnosis Date    A-fib [facility])     Acute renal failure superimposed on stage 3b chronic kidney disease ([facility] ) 03/15/2021    Cardiac disease     Chronic combined systolic (congestive) and diastolic (congestive) heart failure (HCC)     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 ascites (HCC)     Class 3 severe obesity due to excess calories with serious comorbidity and body mass index (BMI) of 45 0 to 49 9 in adult [facility]) 10/17/2018    Diabetes mellitus ([facility] )     Dilated cardiomyopathy ([facility] )     History of echocardiogram 11/24/2014    EF 0 30 (30%), Likely mod LV systolic dysfunction  Likely RV dysfunction as well   History of Lexiscan MPI 02/19/2016    EF 0 43 (43%), no prior MI or ischemia   Hx of echocardiogram 04/28/2017    Normal EF, Normal LV systolic function  Mild concentric LV hypertrophy  Mild mitral and tricuspid regurg   Hx: recurrent pneumonia     Hypertension     Hypokalemia 09/04/2020    Long term (current) use of anticoagulants     Morbid (severe) obesity due to excess calories (HCC)     Neuropathy     Pleural effusion on right     Stage 3 chronic kidney disease ([facility] )      Past Surgical History:   Procedure Laterality Date    HERNIA REPAIR      IR CHEST TUBE CHECK/CHANGE/REPOSITION/UPSIZE  5/27/2021    IR CHEST TUBE PLACEMENT  5/22/2021    IR PELVIC ANGIOGRAM  12/4/2021    IR THORACENTESIS  8/25/2020    IR THORACENTESIS  2/23/2021    LUNG DECORTICATION Right 6/2/2021    Procedure: DECORTICATION LUNG;  Surgeon: Fede Mota MD;  [facility]: [facility];  Service: Thoracic    SPLENECTOMY, TOTAL      THORACOSCOPY VIDEO ASSISTED SURGERY (VATS) Right 6/2/2021    Procedure: THORACOSCOPY VIDEO ASSISTED SURGERY (VATS); Surgeon: Fede Mota MD;  Location: [facility];  Service: Thoracic    VASCULAR SURGERY Right     leg     Social History   Social History     Substance and Sexual Activity   Alcohol Use Yes    Comment: 4 or 5 drinks weekly/bloody chani's     Social History     Substance and Sexual Activity   Drug Use Never     Social History     Tobacco Use   Smoking Status Current Some Day Smoker    Packs/day: 0 25    Types: Cigarettes   Smokeless Tobacco Never Used     History reviewed  No pertinent family history      Meds/Allergies   all current active meds have been reviewed, current meds:   Current Facility-Administered Medications   Medication Dose Route Frequency    acetaminophen (TYLENOL) tablet 650 mg  650 mg Oral Q6H PRN    albuterol inhalation solution 2 5 mg  2 5 mg Nebulization Q6H PRN    aluminum-magnesium hydroxide-simethicone (MYLANTA) oral suspension 30 mL  30 mL Oral Q4H PRN    bumetanide (BUMEX) 12 5 mg infusion 50 mL  1 mg/hr Intravenous Continuous    cefTRIAXone (ROCEPHIN) IVPB (premix in dextrose) 1,000 mg 50 mL  1,000 mg Intravenous Q24H    influenza vaccine, recombinant, quadrivalent (FLUBLOK) IM injection 0 5 mL  0 5 mL Intramuscular Prior to discharge    metoprolol succinate (TOPROL-XL) 24 hr tablet 25 mg  25 mg Oral BID    oxyCODONE (ROXICODONE) immediate release tablet 10 mg  10 mg Oral Q4H PRN    oxyCODONE (ROXICODONE) IR tablet 5 mg  5 mg Oral Q4H PRN    pantoprazole (PROTONIX) EC tablet 40 mg  40 mg Oral Early Morning    senna-docusate sodium (SENOKOT S) 8 6-50 mg per tablet 2 tablet  2 tablet Oral BID    and PTA meds:    Medications Prior to Admission   Medication    aspirin 81 mg chewable tablet    calcium carbonate (TUMS) 500 mg chewable tablet    senna-docusate sodium (SENOKOT S) 8 6-50 mg per tablet    warfarin (COUMADIN) 5 mg tablet    acetaminophen (TYLENOL) 325 mg tablet    citalopram (CeleXA) 10 mg tablet    glucose blood test strip    Lancets (freestyle) lancets    metoprolol succinate (TOPROL-XL) 25 mg 24 hr tablet    midodrine (PROAMATINE) 5 mg tablet    nicotine (NICODERM CQ) 7 mg/24hr TD 24 hr patch    polyethylene glycol (MIRALAX) 17 g packet         No Known Allergies    Objective     Intake/Output Summary (Last 24 hours) at 2/11/2022 1315  Last data filed at 2/11/2022 1253  Gross per 24 hour   Intake 1566 53 ml   Output --   Net 1566 53 ml       Invasive Devices:        Physical Exam  Vitals and nursing note reviewed  Constitutional:       General: He is awake  He is not in acute distress  Appearance: Normal appearance  He is well-developed  He is morbidly obese  He is not ill-appearing, toxic-appearing or diaphoretic  HENT:      Head: Normocephalic and atraumatic  Jaw: There is normal jaw occlusion  Nose: Nose normal       Mouth/Throat:      Mouth: Mucous membranes are moist       Pharynx: Oropharynx is clear  No oropharyngeal exudate or posterior oropharyngeal erythema     Eyes: General: Lids are normal  Vision grossly intact  Gaze aligned appropriately  No scleral icterus  Right eye: No discharge  Left eye: No discharge  Extraocular Movements: Extraocular movements intact  Conjunctiva/sclera: Conjunctivae normal       Pupils: Pupils are equal, round, and reactive to light  Neck:      Thyroid: No thyroid mass or thyromegaly  Vascular: JVD (Unable to assess due to body habitus) present  Trachea: Trachea and phonation normal    Cardiovascular:      Rate and Rhythm: Normal rate and regular rhythm  Heart sounds: Normal heart sounds, S1 normal and S2 normal  No murmur heard  No friction rub  No gallop  Comments: anasarca  Pulmonary:      Effort: Pulmonary effort is normal  No respiratory distress  Breath sounds: Normal breath sounds  No stridor  No wheezing, rhonchi or rales  Abdominal:      General: Abdomen is flat  Bowel sounds are normal  There is no distension  Palpations: Abdomen is soft  There is no mass  Tenderness: There is no abdominal tenderness  There is no guarding  Hernia: No hernia is present  Musculoskeletal:         General: Normal range of motion  Cervical back: Normal range of motion and neck supple  No rigidity or tenderness  Right lower le+ Pitting Edema present  Left lower le+ Pitting Edema present  Lymphadenopathy:      Cervical: No cervical adenopathy  Skin:     General: Skin is warm and dry  Coloration: Skin is not jaundiced  Findings: No bruising  Nails: There is no clubbing  Comments: Changes consistent with chronic venous stasis dermatitis   Neurological:      General: No focal deficit present  Mental Status: He is alert and oriented to person, place, and time  Mental status is at baseline     Psychiatric:         Attention and Perception: Attention and perception normal          Mood and Affect: Mood and affect normal          Speech: Speech normal          Behavior: Behavior normal  Behavior is cooperative  Thought Content: Thought content normal          Judgment: Judgment normal            I/O last 3 completed shifts: In: 2116 5 [P O :1700; I V :66 5; Blood:350]  Out: -     Vitals:    02/11/22 0853   BP: (!) 92/48   Pulse: 100   Resp: 21   Temp: (!) 96 7 °F (35 9 °C)   SpO2: 99%         Current Weight: Weight - Scale: (!) 168 kg (369 lb 11 4 oz)  First Weight: Weight - Scale: (!) 172 kg (380 lb)    Lab Results:  I have personally reviewed pertinent labs      CBC:   Lab Results   Component Value Date    WBC 6 53 02/11/2022    HGB 7 2 (L) 02/11/2022    HCT 23 9 (L) 02/11/2022    MCV 92 02/11/2022     02/11/2022    MCH 27 7 02/11/2022    MCHC 30 1 (L) 02/11/2022    RDW 17 3 (H) 02/11/2022    MPV 10 1 02/11/2022    NRBC 0 02/11/2022     CMP:   Lab Results   Component Value Date    K 3 9 02/11/2022    CL 87 (L) 02/11/2022    CO2 34 (H) 02/11/2022    BUN 90 (H) 02/11/2022    CREATININE 3 21 (H) 02/11/2022    CALCIUM 8 2 (L) 02/11/2022    EGFR 19 02/11/2022     Phosphorus: No results found for: PHOS  Magnesium: No results found for: MG  Urinalysis: No results found for: COLORU, CLARITYU, SPECGRAV, PHUR, LEUKOCYTESUR, NITRITE, PROTEINUA, GLUCOSEU, KETONESU, BILIRUBINUR, BLOODU  Ionized Calcium: No results found for: CAION  Coagulation:   Lab Results   Component Value Date    INR 1 23 (H) 02/11/2022     Troponin: No results found for: TROPONINI  ABG: No results found for: PHART, FRO4SCR, PO2ART, KEG1NIF, C4RBNZPV, BEART, SOURCE    Results from last 7 days   Lab Units 02/11/22  0641 02/10/22  0636 02/09/22  0520   POTASSIUM mmol/L 3 9 4 3 3 2*   CHLORIDE mmol/L 87* 89* 90*   CO2 mmol/L 34* 32 35*   BUN mg/dL 90* 84* 80*   CREATININE mg/dL 3 21* 2 43* 1 87*   CALCIUM mg/dL 8 2* 8 1* 8 0*   ALK PHOS U/L  --   --  143*   ALT U/L  --   --  11*   AST U/L  --   --  22       Radiology review:  Procedure: TRAUMA - CT head wo contrast    Result Date: 2/8/2022  Narrative: CT BRAIN - WITHOUT CONTRAST INDICATION:   TRAUMA  Frequent falls COMPARISON:  None  TECHNIQUE:  CT examination of the brain was performed  In addition to axial images, sagittal and coronal 2D reformatted images were created and submitted for interpretation  Radiation dose length product (DLP) for this visit:  875 21 mGy-cm   This examination, like all CT scans performed in the [facility], was performed utilizing techniques to minimize radiation dose exposure, including the use of iterative  reconstruction and automated exposure control  IMAGE QUALITY:  Diagnostic  FINDINGS: PARENCHYMA:  No intracranial mass, mass effect or midline shift  No CT signs of acute infarction  No acute parenchymal hemorrhage  VENTRICLES AND EXTRA-AXIAL SPACES:  Normal for the patient's age  VISUALIZED ORBITS AND PARANASAL SINUSES:  Unremarkable  CALVARIUM AND EXTRACRANIAL SOFT TISSUES:  Normal      Impression: No acute intracranial abnormality  Workstation performed: [id number]     Procedure: TRAUMA - CT spine cervical wo contrast    Result Date: 2/8/2022  Narrative: CT CERVICAL SPINE - WITHOUT CONTRAST INDICATION:   TRAUMA  COMPARISON:  CT 9/13/21 TECHNIQUE:  CT examination of the cervical spine was performed without intravenous contrast   Contiguous axial images were obtained  Sagittal and coronal reconstructions were performed  Radiation dose length product (DLP) for this visit:  584 66 mGy-cm   This examination, like all CT scans performed in the [facility], was performed utilizing techniques to minimize radiation dose exposure, including the use of iterative  reconstruction and automated exposure control  IMAGE QUALITY:  Diagnostic  FINDINGS: ALIGNMENT:  Normal alignment of the cervical spine  No subluxation  VERTEBRAL BODIES:  No fracture  DEGENERATIVE CHANGES:  Moderate multilevel cervical degenerative changes are noted  No critical central canal stenosis   PREVERTEBRAL AND PARASPINAL SOFT TISSUES:  Left neck base lymphadenopathy measures as large as 1 3 cm  THORACIC INLET:  Normal      Impression: 1  No cervical spine fracture or traumatic malalignment  2   Cervical lymphadenopathy  Workstation performed: [id number]     Procedure: TRAUMA - CT chest abdomen pelvis w contrast    Result Date: 2/8/2022  Narrative: CT CHEST, ABDOMEN AND PELVIS WITH IV CONTRAST INDICATION:   TRAUMA  COMPARISON:  CT 12/4/2021 and 9/13/2021 TECHNIQUE: CT examination of the chest, abdomen and pelvis was performed  Axial, sagittal, and coronal 2D reformatted images were created from the source data and submitted for interpretation  Radiation dose length product (DLP) for this visit:  3437 28 mGy-cm   This examination, like all CT scans performed in the [facility], was performed utilizing techniques to minimize radiation dose exposure, including the use of iterative reconstruction and automated exposure control  IV Contrast:  100 mL of iohexol (OMNIPAQUE) Enteric Contrast: Enteric contrast was administered  FINDINGS: CHEST LUNGS:  Right lower lobe airspace opacities appear progressed PLEURA:  Small right pleural effusion HEART/GREAT VESSELS: Cardiomegaly  No thoracic aortic aneurysm  There is enlargement of the central pulmonary arterial tree suggesting some element of pulmonary artery hypertension  MEDIASTINUM AND DILLON:  Left neck base lymphadenopathy measures as large as 1 1 cm series 2/8, this is overestimated on the concurrent cervical spine CT report as no contrast was given Superior mediastinal / prevascular lymph node measures 1 2 cm series 2/14 CHEST WALL AND LOWER NECK:   Unremarkable  ABDOMEN LIVER/BILIARY TREE:  Liver span is 21 cm Nodular contour is present  GALLBLADDER:  Contains gallstones SPLEEN:  Mild splenomegaly  Stable pericapsular collection with peripheral calcification measures about 6 cm, likely posttraumatic PANCREAS:  Unremarkable  ADRENAL GLANDS:  Unremarkable  KIDNEYS/URETERS:  Unremarkable  No hydronephrosis  STOMACH AND BOWEL:  Unremarkable  APPENDIX:  No findings to suggest appendicitis  ABDOMINOPELVIC CAVITY:  Stable cystic structure adjacent to the 2nd portion of the duodenum measuring about 3 4 cm series 2/77 Mild mesenteric edema Mild volume ascites centered within the upper abdomen VESSELS:  Unremarkable for patient's age  PELVIS REPRODUCTIVE ORGANS:  Unremarkable for patient's age  URINARY BLADDER:  Unremarkable  ABDOMINAL WALL/INGUINAL REGIONS:  Anasarca 14 x 11 7 cm dense collection noted within the left lower abdominal wall, which is mildly increased from prior  There is no metabolic material within the left abdominal wall vasculature Venous collaterals are seen throughout the lower abdominal wall as well as the right inguinal region and right proximal thigh OSSEOUS STRUCTURES:  No acute fracture or destructive osseous lesion  Impression: 1  Left abdominal wall hematoma is mildly increased from December 2021 2  Mediastinal/left neck base lymphadenopathy, consider follow-up neck and chest CT in 3 months to evaluate for persistence 3  Right lower lobe airspace opacities, concerning for pneumonia 4  Anasarca 5  Small right pleural effusion 6  Cirrhotic morphology with ascites 7  Additional findings as above Workstation performed: [id number]         Chelsey Krishna PA-C      Portions of the record may have been created with voice recognition software  Occasional wrong word or "sound a like" substitutions may have occurred due to the inherent limitations of voice recognition software  Read the chart carefully and recognize, using context, where substitutions have occurred

## 2022-02-11 NOTE — PROGRESS NOTES
Progress Note - General Surgery   Burak Mercado 57 y o  male MRN: [id number]  Unit/Bed#:  Encounter: [id number]    Assessment:  58-year-old gentleman with numerous comorbidities admitted status post recurrent fall and also noted to have pneumonia  Noted to have a large rectus sheath hematoma slightly enlarged compared to previous CT scan  Patient recently transferred to ICU status due to hypotension tachycardia  Hemoglobin responded to transfusion but now trending down  Hypotension improved as has tachycardia  Patient still with significant anasarca and significant fluid retention, on Bumex drip  Unfortunately has not been able to urinate in the last 24 hours  Noted worsening renal function     Plan:  - trend hemoglobin, transfuse as necessary, I would continue to hold anticoagulation until hemoglobin stabilizes  - worsening renal function likely secondary to post renal obstruction given aneuric in the last 24 hours  - IR has been consulted for placement of Gann catheter versus suprapubic catheter  - continue antibiotics as per primary medical team for pneumonia  - remainder of comorbidities as per primary medical team      From general surgery perspective slowly trending down hemoglobin of unclear etiology  He likely has anemia of chronic disease in the setting of worsening renal function  However the hematoma is still of concern  It is a hole old hematoma from December but he did fall once more  Recent CT scan again showing slightly increased but it is unclear if this is interval increase from the initial hematoma and not something active  Given patient's worsening renal function a CT of the abdomen would put him at substantial risk for renal decline requiring dialysis  After discussion with medical team they prefer to hold off and just continue to trend the hemoglobin  I think this is reasonable  His hypotension has leveled off and overall improved as has his tachycardia     General surgery will continue to follow      Subjective/Objective   Chief Complaint:  No new complaints    Subjective:  Overall feels slightly better  Still tired  Patient wishes he could urinate  Abdomen he feels softer without significant pain  Objective:     Blood pressure (!) 92/48, pulse 100, temperature (!) 96 7 °F (35 9 °C), temperature source Tympanic, resp  rate 21, height 6' (1 829 m), weight (!) 168 kg (369 lb 11 4 oz), SpO2 99 %  ,Body mass index is 50 14 kg/m²  Intake/Output Summary (Last 24 hours) at 2/11/2022 1455  Last data filed at 2/11/2022 1253  Gross per 24 hour   Intake 1566 53 ml   Output --   Net 1566 53 ml       Invasive Devices  Report    Peripheral Intravenous Line            Peripheral IV 02/08/22 Right Antecubital 3 days    Peripheral IV 02/10/22 Right;Ventral (anterior) Forearm 1 day          Drain            Urethral Catheter Other (Comment) 10 2 Fr  <1 day                Physical Exam:   General:  Acute distress but does appear uncomfortable  HEENT:  Normocephalic, atraumatic trachea midline  CV:  Tachycardic, S1-S2 audible, irregular regular rhythm consistent with AFib  Pulmonary:  Decreased breath sounds bilaterally, no significant wheezes  GI:  Abdomen is super morbidly obese, abdomen is firm likely secondary to hematoma in overall severe edema secondary to anasarca, no significant tenderness to palpation  MSK:  Lower extremities without clubbing or cyanosis  Neuro:  Alert orient x3, cranial 2-12 grossly intact  Psych:  Mood and Affect appropriate              Lab, Imaging and other studies:  I have personally reviewed pertinent lab results    , CBC:   Lab Results   Component Value Date    WBC 6 53 02/11/2022    HGB 7 2 (L) 02/11/2022    HCT 23 9 (L) 02/11/2022    MCV 92 02/11/2022     02/11/2022    MCH 27 7 02/11/2022    MCHC 30 1 (L) 02/11/2022    RDW 17 3 (H) 02/11/2022    MPV 10 1 02/11/2022    NRBC 0 02/11/2022   , CMP:   Lab Results   Component Value Date    SODIUM 127 (L) 02/11/2022    K 3 9 02/11/2022    CL 87 (L) 02/11/2022    CO2 34 (H) 02/11/2022    BUN 90 (H) 02/11/2022    CREATININE 3 21 (H) 02/11/2022    CALCIUM 8 2 (L) 02/11/2022    EGFR 19 02/11/2022   , Coagulation:   Lab Results   Component Value Date    INR 1 23 (H) 02/11/2022   , Urinalysis: No results found for: [place], SPECGRAV, PHUR, LEUKOCYTESUR, NITRITE, PROTEINUA, GLUCOSEU, KETONESU, BILIRUBINUR, BLOODU, Amylase: No results found for: AMYLASE, Lipase: No results found for: LIPASE  VTE Pharmacologic Prophylaxis: Reason for no pharmacologic prophylaxis Anemia  VTE Mechanical Prophylaxis: sequential compression device

## 2022-02-11 NOTE — PROGRESS NOTES
Cardiology Progress Note - Carry Verna 57 y o  male MRN: [id number]    Unit/Bed#:  Encounter: [id number]    Assessment/Plan:  1  Acute on chronic diastolic congestive heart failure              - patient is significantly volume overloaded on exam   - standing weight today 369 lbs - patient was discharged last year at a weight of 298 lbs  - most likely has 50-70 lbs of volume on board   - due to poor urinary response to lasix gtt yesterday, he was transitioned to a bumex gtt @ 1 mg/hr but states he has not urinated since Wednesday              - attempts at leahy catheter placement have been unsuccessful thus far   - now with MARISABEL - creatinine 3 21 yesterday, 2 41 the day prior, and 1 87 on 2/8   - would consider giving metolazone x 1 dose, but will defer to nephrology given MARISABEL  - renal function should improve with successful diuresis              - he needs daily standing weights and strict I/O              - continue low sodium diet and fluid restriction              - monitor daily BMP     2  Chronic atrial fibrillation              - rates have been elevated secondary to missing doses of metoprolol in the setting of hypotension              - will make hold parameters more lenient               - continue metoprolol at current dose              - coumadin on hold given #3 and abdominal wall hematoma     3  Acute on chronic anemia              - patient is s/p 3 units of PRBC's              - hemoglobin steadily dropping to 7 2 today   - patient with abdominal wall hematoma which may be contributing   - general surgery is following    3   Acute kidney injury   - most likely cardiorenal in etiology   - patient states he has not urinated since Wednesday    - patient is significantly volume overloaded likely 50-70 lbs and needs to be diuresed, suspect renal function will improve with diuresis   - attempts at leahy catheter placement have been unsuccessful thus far   - would consider giving metolazone x 1 dose, but will defer to nephrology given MARISABEL      4  History of Hypertension               - now with intermittent hypotension, continue to monitor BP closely     Subjective:   Patient seen and examined  He feels ok  He has not urinated since Wednesday  He continues with significant lower extremity edema and abdominal bloating  Review of Systems   Constitutional: Negative for chills and fever  HENT: Negative  Cardiovascular: Positive for dyspnea on exertion and leg swelling  Negative for chest pain  Respiratory: Positive for shortness of breath  Negative for cough  Gastrointestinal: Positive for bloating  Negative for diarrhea, nausea and vomiting  Neurological: Negative for dizziness and light-headedness  All other systems reviewed and are negative  Objective:   Vitals: Blood pressure (!) 92/48, pulse 100, temperature (!) 96 7 °F (35 9 °C), temperature source Tympanic, resp  rate 21, height 6' (1 829 m), weight (!) 168 kg (369 lb 11 4 oz), SpO2 99 %  , Body mass index is 50 14 kg/m² ,   Orthostatic Blood Pressures      Most Recent Value   Blood Pressure 92/48 filed at 2022 0853   Patient Position - Orthostatic VS Sitting filed at 2022 9023         Systolic (82DRD), QYN:671 , Min:92 , NN     Diastolic (49UTU), OSR:78, Min:48, Max:59      Intake/Output Summary (Last 24 hours) at 2022 1041  Last data filed at 2022 0901  Gross per 24 hour   Intake 1566 53 ml   Output --   Net 1566 53 ml     Weight (last 2 days)     Date/Time Weight    22 0554 168 (369 71)            Telemetry Review: atrial fibrillation, rates 100-120 bpm  EKG personally reviewed by Javier Wyman PA-C  Physical Exam  Vitals reviewed  Constitutional:       General: He is not in acute distress  Appearance: He is well-developed  He is obese  He is not diaphoretic  HENT:      Head: Normocephalic and atraumatic  Eyes:      Pupils: Pupils are equal, round, and reactive to light     Neck: Vascular: No carotid bruit  Cardiovascular:      Rate and Rhythm: Tachycardia present  Rhythm irregularly irregular  Pulses:           Radial pulses are 2+ on the right side and 2+ on the left side  Dorsalis pedis pulses are 2+ on the right side and 2+ on the left side  Heart sounds: S1 normal and S2 normal  Murmur heard  Systolic murmur is present  Pulmonary:      Effort: Pulmonary effort is normal  No respiratory distress  Breath sounds: Normal breath sounds  No wheezing or rales  Abdominal:      General: There is distension  Tenderness: There is no abdominal tenderness  Musculoskeletal:         General: Normal range of motion  Cervical back: Normal range of motion  Right lower leg: Edema (+2 bilateral pitting edema extending into thighs bilaterally) present  Left lower leg: Edema present  Skin:     General: Skin is warm and dry  Findings: No erythema  Neurological:      General: No focal deficit present  Mental Status: He is alert and oriented to person, place, and time     Psychiatric:         Mood and Affect: Mood normal          Behavior: Behavior normal            Laboratory Results:        CBC with diff:   Results from last 7 days   Lab Units 02/11/22  0641 02/10/22  1414 02/10/22  0911 02/10/22  0053 02/09/22  1722 02/09/22  0913 02/09/22  0520 02/08/22  1732 02/08/22  1256   WBC Thousand/uL 6 53  --  6 39  --   --   --  5 50  --  5 58   HEMOGLOBIN g/dL 7 2* 7 5* 7 7* 6 8* 7 3* 7 6* 6 6*   < > 7 1*   HEMATOCRIT % 23 9* 24 3* 25 5* 22 2* 23 9* 24 5* 21 4*   < > 23 1*   MCV fL 92  --  92  --   --   --  90  --  90   PLATELETS Thousands/uL 170  --  192  --   --   --  178  --  184   MCH pg 27 7  --  27 8  --   --   --  27 7  --  27 7   MCHC g/dL 30 1*  --  30 2*  --   --   --  30 8*  --  30 7*   RDW % 17 3*  --  17 3*  --   --   --  17 7*  --  17 9*   MPV fL 10 1  --  10 2  --   --   --  10 8  --  10 4   NRBC AUTO /100 WBCs 0  --  0  --   -- --  0  --  0    < > = values in this interval not displayed  CMP:  Results from last 7 days   Lab Units 22  0641 02/10/22  0636 22  0520 22  1256   POTASSIUM mmol/L 3 9 4 3 3 2* 2 5*   CHLORIDE mmol/L 87* 89* 90* 89*   CO2 mmol/L 34* 32 35* 37*   BUN mg/dL 90* 84* 80* 82*   CREATININE mg/dL 3 21* 2 43* 1 87* 1 91*   CALCIUM mg/dL 8 2* 8 1* 8 0* 8 1*   AST U/L  --   --  22  --    ALT U/L  --   --  11*  --    ALK PHOS U/L  --   --  143*  --    EGFR ml/min/1 73sq m 19 27 37 36         BMP:  Results from last 7 days   Lab Units 22  0641 02/10/22  0636 22  0520 22  1256   POTASSIUM mmol/L 3 9 4 3 3 2* 2 5*   CHLORIDE mmol/L 87* 89* 90* 89*   CO2 mmol/L 34* 32 35* 37*   BUN mg/dL 90* 84* 80* 82*   CREATININE mg/dL 3 21* 2 43* 1 87* 1 91*   CALCIUM mg/dL 8 2* 8 1* 8 0* 8 1*       BNP: No results for input(s): BNP in the last 72 hours      Magnesium:   Results from last 7 days   Lab Units 22  0520 22  1256   MAGNESIUM mg/dL 2 2 2 1       Coags:   Results from last 7 days   Lab Units 22  0846 02/10/22  0911 22  1256   PTT seconds  --   --  52*   INR  1 23* 1 53* 1 63*       TSH:        Hemoglobin A1C       Lipid Profile:       Cardiac testing:   Results for orders placed during the hospital encounter of 21    Echo complete with contrast if indicated    Narrative  [address] Located within [facility] [address] [facility] [address] [place] [address]  [phone]    Transthoracic Echocardiogram  2D, M-mode, Doppler, and Color Doppler    Study date:  2021    Patient: Montserrat Nazario  MR number: [id number]  Account number: [de-identified]  : 1960  Age: 57 years  Gender: Male  Status: Inpatient  Location: Bedside  Height: 71 in  Weight: 350 lb  BP: 130/ 80 mmHg    Indications: shortness of breath    Diagnoses: 428 0 - CONGESTIVE HEART FAILURE    Sonographer:  Uriel 61, CCT  Primary Physician:  Vero Carrero DO  Referring Physician: Michael Winn DO  Group:  [facility]  Interpreting Physician:  Shadia Billings DO    SUMMARY    LEFT VENTRICLE:  Systolic function was normal  Ejection fraction was estimated to be 60 %  There were no regional wall motion abnormalities  Wall thickness was mildly increased  RIGHT VENTRICLE:  The ventricle was moderately dilated  Systolic function was mildly reduced  LEFT ATRIUM:  The atrium was mildly dilated  RIGHT ATRIUM:  The atrium was moderately dilated  MITRAL VALVE:  There was mild regurgitation  TRICUSPID VALVE:  There was moderate regurgitation  Estimated peak PA pressure was 52 mmHg  HISTORY: PRIOR HISTORY: CHF, morbid obesity    PROCEDURE: The procedure was performed at the bedside  This was a routine study  The transthoracic approach was used  The study included complete 2D imaging, M-mode, complete spectral Doppler, and color Doppler  The heart rate was 80 bpm,  at the start of the study  Echocardiographic views were limited due to poor patient compliance and poor acoustic window availability  This was a technically difficult study  LEFT VENTRICLE: Size was normal  Systolic function was normal  Ejection fraction was estimated to be 60 %  There were no regional wall motion abnormalities  Wall thickness was mildly increased  DOPPLER: The study was not technically  sufficient to allow evaluation of LV diastolic function  RIGHT VENTRICLE: The ventricle was moderately dilated  Systolic function was mildly reduced  LEFT ATRIUM: The atrium was mildly dilated  RIGHT ATRIUM: The atrium was moderately dilated  MITRAL VALVE: Valve structure was normal  There was normal leaflet separation  DOPPLER: The transmitral velocity was within the normal range  There was no evidence for stenosis  There was mild regurgitation  AORTIC VALVE: The valve was trileaflet  Leaflets exhibited normal thickness and normal cuspal separation   DOPPLER: Transaortic velocity was within the normal range  There was no evidence for stenosis  There was no regurgitation  TRICUSPID VALVE: The valve structure was normal  There was normal leaflet separation  DOPPLER: The transtricuspid velocity was within the normal range  There was no evidence for stenosis  There was moderate regurgitation  Estimated peak PA  pressure was 52 mmHg  PULMONIC VALVE: Leaflets exhibited normal thickness, no calcification, and normal cuspal separation  DOPPLER: The transpulmonic velocity was within the normal range  There was no regurgitation  PERICARDIUM: There was no pericardial effusion  The pericardium was normal in appearance  AORTA: The root exhibited normal size  SYSTEMIC VEINS: IVC: The inferior vena cava was not well visualized  SYSTEM MEASUREMENT TABLES    2D  %FS: 31 45 %  Ao Diam: 3 22 cm  EDV(Teich): 148 86 ml  EF(Teich): 58 74 %  ESV(Teich): 61 42 ml  IVSd: 1 51 cm  LA Area: 20 63 cm2  LA Diam: 5 65 cm  LVIDd: 5 52 cm  LVIDs: 3 79 cm  LVPWd: 1 08 cm  RA Area: 33 86 cm2  RWT: 0 39  SV(Teich): 87 44 ml    CW  RAP: 0 mmHg  TR Vmax: 3 47 m/s  TR maxP 33 mmHg    PW  E' Sept: 0 09 m/s  E/E' Sept: 13 35  MV A Ti: 0 48 m/s  MV Dec White: 4 5 m/s2  MV DecT: 253 15 ms  MV E Ti: 1 14 m/s  MV E/A Ratio: 2 36  RVSP: 48 33 mmHg    [facility]    Prepared and electronically signed by    Radha Castañeda DO  Signed 2021 08:06:00    No results found for this or any previous visit  No results found for this or any previous visit  No results found for this or any previous visit        Meds/Allergies   all current active meds have been reviewed  Medications Prior to Admission   Medication    aspirin 81 mg chewable tablet    calcium carbonate (TUMS) 500 mg chewable tablet    senna-docusate sodium (SENOKOT S) 8 6-50 mg per tablet    warfarin (COUMADIN) 5 mg tablet    acetaminophen (TYLENOL) 325 mg tablet    citalopram (CeleXA) 10 mg tablet    glucose blood test strip    Lancets (freestyle) lancets    metoprolol succinate (TOPROL-XL) 25 mg 24 hr tablet    midodrine (PROAMATINE) 5 mg tablet    nicotine (NICODERM CQ) 7 mg/24hr TD 24 hr patch    polyethylene glycol (MIRALAX) 17 g packet       bumetanide, 1 mg/hr, Last Rate: 1 mg/hr (02/11/22 8826)      Assessment:  Principal Problem:    Acute on chronic diastolic (congestive) heart failure (HCC)  Active Problems:    Cirrhosis of liver with ascites (HCC)    Acute on chronic anemia    Permanent atrial fibrillation (HCC)    Hyponatremia    CKD (chronic kidney disease)    Morbid (severe) obesity due to excess calories (HCC)    Abdominal wall hematoma    RLL pneumonia

## 2022-02-11 NOTE — ASSESSMENT & PLAN NOTE
Acute on chronic in the setting of chronic disease, Previous baseline hemoglobin is 7-8  Anemia is also in in the setting of rectus sheath hematoma, prior history of AVMs per GI  Also anticoagulated on Coumadin which presents an issue with acute bleeding  Now also hypotensive, tachcardic  S/p total 3 units PRBCs, last transfusion overnight, Hgb appropriately improved from 6 8 --> 7 7  Remaining stable at this range overnight  Continue to monitor H/H closely  Continue to Transfuse for hgb < 7  Follow hemoccult testing, consider further GI workup if needed  D/w GI possible inpatient EGD / colonoscopy Tuesday if anemia continues, hemoccult positive  Transfered to ICU for closer hemodynamic monitoring in the setting of possible acute bleed  General surgery aware

## 2022-02-11 NOTE — ASSESSMENT & PLAN NOTE
Acute urinary retention noted, no output in 24 hours per nursing  D/w urology  IR available to place urinary catheter - contrast for urethra-gram   Likely contributing to MARISABEL    Consider inpatient vs  Outpatient voiding trial

## 2022-02-11 NOTE — PLAN OF CARE
Problem: Potential for Falls  Goal: Patient will remain free of falls  Description: INTERVENTIONS:  - Educate patient/family on patient safety including physical limitations  - Instruct patient to call for assistance with activity   - Consult OT/PT to assist with strengthening/mobility   - Keep Call bell within reach  - Keep bed low and locked with side rails adjusted as appropriate  - Keep care items and personal belongings within reach  - Initiate and maintain comfort rounds  - Make Fall Risk Sign visible to staff  - Offer Toileting every 1-2 Hours, in advance of need  - Initiate/Maintain bed and chair alarm  - Obtain necessary fall risk management equipment: fall socks  - Apply yellow socks and bracelet for high fall risk patients  - Consider moving patient to room near nurses station  2/11/2022 0122 by Dayana Vasquez RN  Outcome: Progressing  2/11/2022 0122 by Dayana Vasquez RN  Outcome: Progressing     Problem: MOBILITY - ADULT  Goal: Maintain or return to baseline ADL function  Description: INTERVENTIONS:  - Educate patient/family on patient safety including physical limitations  - Instruct patient to call for assistance with activity   - Consult OT/PT to assist with strengthening/mobility   - Keep Call bell within reach  - Keep bed low and locked with side rails adjusted as appropriate  - Keep care items and personal belongings within reach  - Initiate and maintain comfort rounds  - Make Fall Risk Sign visible to staff  - Offer Toileting every 1-2 Hours, in advance of need  - Initiate/Maintain bed and chair alarm  - Obtain necessary fall risk management equipment: fall socks  - Apply yellow socks and bracelet for high fall risk patients  - Consider moving patient to room near nurses station  2/11/2022 0122 by Dayana Vasquez RN  Outcome: Progressing  2/11/2022 0122 by Dayana Vasquez RN  Outcome: Progressing  Goal: Maintains/Returns to pre admission functional level  Description: INTERVENTIONS:  - Perform BMAT or MOVE assessment daily    - Set and communicate daily mobility goal to care team and patient/family/caregiver     - Collaborate with rehabilitation services on mobility goals if consulted  - Out of bed for toileting  - Record patient progress and toleration of activity level   2/11/2022 0122 by Mane Martinez RN  Outcome: Progressing  2/11/2022 0122 by Mane Martinez RN  Outcome: Progressing     Problem: PAIN - ADULT  Goal: Verbalizes/displays adequate comfort level or baseline comfort level  Description: Interventions:  - Encourage patient to monitor pain and request assistance  - Assess pain using appropriate pain scale  - Administer analgesics based on type and severity of pain and evaluate response  - Implement non-pharmacological measures as appropriate and evaluate response  - Consider cultural and social influences on pain and pain management  - Notify physician/advanced practitioner if interventions unsuccessful or patient reports new pain  2/11/2022 0122 by Mane Martinez RN  Outcome: Progressing  2/11/2022 0122 by Mane Martinez RN  Outcome: Progressing     Problem: INFECTION - ADULT  Goal: Absence or prevention of progression during hospitalization  Description: INTERVENTIONS:  - Assess and monitor for signs and symptoms of infection  - Monitor lab/diagnostic results  - Monitor all insertion sites, i e  indwelling lines, tubes, and drains  - Administer medications as ordered  - Instruct and encourage patient and family to use good hand hygiene technique  - Identify and instruct in appropriate isolation precautions for identified infection/condition  2/11/2022 0122 by Mane Martinez RN  Outcome: Progressing  2/11/2022 0122 by Mane Martinez RN  Outcome: Progressing     Problem: SAFETY ADULT  Goal: Patient will remain free of falls  Description: INTERVENTIONS:  - Educate patient/family on patient safety including physical limitations  - Instruct patient to call for assistance with activity   - Consult OT/PT to assist with strengthening/mobility   - Keep Call bell within reach  - Keep bed low and locked with side rails adjusted as appropriate  - Keep care items and personal belongings within reach  - Initiate and maintain comfort rounds  - Make Fall Risk Sign visible to staff  - Offer Toileting every 1-2 Hours, in advance of need  - Initiate/Maintain bed and chair alarm  - Obtain necessary fall risk management equipment: fall socks  - Apply yellow socks and bracelet for high fall risk patients  - Consider moving patient to room near nurses station  2/11/2022 0122 by Rosenda Singh RN  Outcome: Progressing  2/11/2022 0122 by Rosenda Singh RN  Outcome: Progressing  Goal: Maintain or return to baseline ADL function  Description: INTERVENTIONS:  - Educate patient/family on patient safety including physical limitations  - Instruct patient to call for assistance with activity   - Consult OT/PT to assist with strengthening/mobility   - Keep Call bell within reach  - Keep bed low and locked with side rails adjusted as appropriate  - Keep care items and personal belongings within reach  - Initiate and maintain comfort rounds  - Make Fall Risk Sign visible to staff  - Offer Toileting every 1-2 Hours, in advance of need  - Initiate/Maintain bed and chair alarm  - Obtain necessary fall risk management equipment: fall socks  - Apply yellow socks and bracelet for high fall risk patients  - Consider moving patient to room near nurses station  2/11/2022 0122 by Rosenda Singh RN  Outcome: Progressing  2/11/2022 0122 by Rosenda Singh RN  Outcome: Progressing  Goal: Maintains/Returns to pre admission functional level  Description: INTERVENTIONS:  - Perform BMAT or MOVE assessment daily    - Set and communicate daily mobility goal to care team and patient/family/caregiver     - Collaborate with rehabilitation services on mobility goals if consulted  - Out of bed for toileting  - Record patient progress and toleration of activity level   2/11/2022 0122 by Rosenda Singh RN  Outcome: Progressing  2/11/2022 0122 by Rosenda Singh RN  Outcome: Progressing     Problem: DISCHARGE PLANNING  Goal: Discharge to home or other facility with appropriate resources  Description: INTERVENTIONS:  - Identify barriers to discharge w/patient and caregiver  - Arrange for needed discharge resources and transportation as appropriate  - Identify discharge learning needs (meds, wound care, etc )  - Refer to Case Management Department for coordinating discharge planning if the patient needs post-hospital services based on physician/advanced practitioner order or complex needs related to functional status, cognitive ability, or social support system  2/11/2022 0122 by Rosenda Sinhg RN  Outcome: Progressing  2/11/2022 0122 by Rosenda Singh RN  Outcome: Progressing     Problem: Knowledge Deficit  Goal: Patient/family/caregiver demonstrates understanding of disease process, treatment plan, medications, and discharge instructions  Description: Complete learning assessment and assess knowledge base    Interventions:  - Provide teaching at level of understanding  - Provide teaching via preferred learning methods  2/11/2022 0122 by Rosenda Singh RN  Outcome: Progressing  2/11/2022 0122 by Rosenda Singh RN  Outcome: Progressing     Problem: Prexisting or High Potential for Compromised Skin Integrity  Goal: Skin integrity is maintained or improved  Description: INTERVENTIONS:  - Identify patients at risk for skin breakdown  - Assess and monitor skin integrity  - Assess and monitor nutrition and hydration status  - Monitor labs   - Assess for incontinence   - Turn and reposition patient  - Assist with mobility/ambulation  - Relieve pressure over bony prominences  - Avoid friction and shearing  - Provide appropriate hygiene as needed including keeping skin clean and dry  - Evaluate need for skin moisturizer/barrier cream  - Collaborate with interdisciplinary team   - Patient/family teaching  - Consider wound care consult   2/11/2022 0122 by Rony Duffy RN  Outcome: Progressing  2/11/2022 0122 by Rony Duffy RN  Outcome: Progressing     Problem: CARDIOVASCULAR - ADULT  Goal: Maintains optimal cardiac output and hemodynamic stability  Description: INTERVENTIONS:  - Monitor I/O, vital signs and rhythm  - Monitor for S/S and trends of decreased cardiac output  - Administer and titrate ordered vasoactive medications to optimize hemodynamic stability  - Assess quality of pulses, skin color and temperature  - Assess for signs of decreased coronary artery perfusion  - Instruct patient to report change in severity of symptoms  2/11/2022 0122 by Rony Duffy RN  Outcome: Progressing  2/11/2022 0122 by Rony Duffy RN  Outcome: Progressing  Goal: Absence of cardiac dysrhythmias or at baseline rhythm  Description: INTERVENTIONS:  - Continuous cardiac monitoring, vital signs, obtain 12 lead EKG if ordered  - Administer antiarrhythmic and heart rate control medications as ordered  - Monitor electrolytes and administer replacement therapy as ordered  2/11/2022 0122 by Rony Duffy RN  Outcome: Progressing  2/11/2022 0122 by Rony Duffy RN  Outcome: Progressing     Problem: RESPIRATORY - ADULT  Goal: Achieves optimal ventilation and oxygenation  Description: INTERVENTIONS:  - Assess for changes in respiratory status  - Assess for changes in mentation and behavior  - Position to facilitate oxygenation and minimize respiratory effort  - Oxygen administered by appropriate delivery if ordered  - Initiate smoking cessation education as indicated  - Encourage broncho-pulmonary hygiene including cough, deep breathe, Incentive Spirometry  - Assess the need for suctioning and aspirate as needed  - Assess and instruct to report SOB or any respiratory difficulty  - Respiratory Therapy support as indicated  2/11/2022 0122 by Nayan Hernandez RN  Outcome: Progressing  2/11/2022 0122 by Nayan Hernandez RN  Outcome: Progressing     Problem: METABOLIC, FLUID AND ELECTROLYTES - ADULT  Goal: Electrolytes maintained within normal limits  Description: INTERVENTIONS:  - Monitor labs and assess patient for signs and symptoms of electrolyte imbalances  - Administer electrolyte replacement as ordered  - Monitor response to electrolyte replacements, including repeat lab results as appropriate  - Instruct patient on fluid and nutrition as appropriate  2/11/2022 0122 by Nayan Hernandez RN  Outcome: Progressing  2/11/2022 0122 by Nayan Hernandez RN  Outcome: Progressing  Goal: Fluid balance maintained  Description: INTERVENTIONS:  - Monitor labs   - Monitor I/O and WT  - Instruct patient on fluid and nutrition as appropriate  - Assess for signs & symptoms of volume excess or deficit  2/11/2022 0122 by Nayan Hernandez RN  Outcome: Progressing  2/11/2022 0122 by Nayan Hernandez RN  Outcome: Progressing  Goal: Glucose maintained within target range  Description: INTERVENTIONS:  - Monitor Blood Glucose as ordered  - Assess for signs and symptoms of hyperglycemia and hypoglycemia  - Administer ordered medications to maintain glucose within target range  - Assess nutritional intake and initiate nutrition service referral as needed  2/11/2022 0122 by Nayan Hernandez RN  Outcome: Progressing  2/11/2022 0122 by Nayan Hernandez RN  Outcome: Progressing     Problem: HEMATOLOGIC - ADULT  Goal: Maintains hematologic stability  Description: INTERVENTIONS  - Assess for signs and symptoms of bleeding or hemorrhage  - Monitor labs  - Administer supportive blood products/factors as ordered and appropriate  2/11/2022 0122 by Nayan Hernandez RN  Outcome: Progressing  2/11/2022 0122 by Nayan Hernandez RN  Outcome: Progressing

## 2022-02-11 NOTE — ASSESSMENT & PLAN NOTE
Lab Results   Component Value Date    EGFR 19 02/11/2022    EGFR 27 02/10/2022    EGFR 37 02/09/2022    CREATININE 3 21 (H) 02/11/2022    CREATININE 2 43 (H) 02/10/2022    CREATININE 1 87 (H) 02/09/2022     Renal function has fluctuated over the past 3-4 months but it appears his baseline creatinine is around 2  Now with worsening creatinine in the setting of IV diuresis, Post obstructive uropathy  Now with urinary catheter placed by IR  Monitor renal function closely with diuresis  Intake and output  Nephrology consult and ongoing support appreciated

## 2022-02-11 NOTE — ASSESSMENT & PLAN NOTE
CT chest showing concern for RLL pneumonia  procalcitonin x 2 positive at 0 5 however could be elevated due to CKD  Urinary antigens negative, discontinue azithromycin  Continue IV ceftriaxone - day #4  WBC remains normal, afebrile  Check sputum culture - pending    Added respiratory protocol, incentive spirometry

## 2022-02-11 NOTE — PROGRESS NOTES
[address] [facility]  Progress Note - Marta Goldstein 1960, 57 y o  male MRN: [id number]  Unit/Bed#:  Encounter: [id number]  Primary Care Provider: Jennifer Feldman DO   Date and time admitted to hospital: 2/8/2022 12:45 PM    Acute on chronic anemia  Assessment & Plan  Acute on chronic in the setting of chronic disease, Previous baseline hemoglobin is 7-8  Anemia is also in in the setting of rectus sheath hematoma, prior history of AVMs per GI  Also anticoagulated on Coumadin which presents an issue with acute bleeding  Now also hypotensive, tachcardic  S/p total 3 units PRBCs, last transfusion overnight, Hgb appropriately improved from 6 8 --> 7 7  Remaining stable at this range overnight  Continue to monitor H/H closely  Continue to Transfuse for hgb < 7  Follow hemoccult testing, consider further GI workup if needed  D/w GI possible inpatient EGD / colonoscopy Tuesday if anemia continues, hemoccult positive  Transfered to ICU for closer hemodynamic monitoring in the setting of possible acute bleed  General surgery aware  RLL pneumonia  Assessment & Plan  CT chest showing concern for RLL pneumonia  procalcitonin x 2 positive at 0 5 however could be elevated due to CKD  Urinary antigens negative, discontinue azithromycin  Continue IV ceftriaxone - day #4  WBC remains normal, afebrile  Check sputum culture - pending  Added respiratory protocol, incentive spirometry      Abdominal wall hematoma  Assessment & Plan  Slightly increased in size since December  recent trauma noted  Continue to check H&H frequently - did receive 3 units PRBCs so far  Surgical consult appreciated, no intervention needed at this time, abdominal binder ordered, pain meds PRN  Hold anticoagulation - INR is trending down nicely  Continue to monitor closely  Cirrhosis of liver with ascites [facility])  Assessment & Plan  Due a history of alcohol use    Now with abdominal ascites on exam   Consulted IR for possible paracentesis and labs  Low volume ascites noted, not worth risk  GI consult appreciated, recommend outpatient EGD   May need to consider possible inpatient EGD, colonoscopy if hemoccult testing positive  D/w GI  MELD 24 (INR is in the setting of coumadin use however)  * Acute on chronic diastolic (congestive) heart failure [facility])  Assessment & Plan  Wt Readings from Last 3 Encounters:   02/11/22 (!) 168 kg (369 lb 11 4 oz)   12/29/21 (!) 152 kg (336 lb)   12/14/21 (!) 152 kg (335 lb 1 6 oz)     With diffuse anasarca  Prior discharge weight is closer to 340lbs, approximately 30-40 lbs up from dry weight  Chronic diuretic regimen is lasix 120mg BID with PRN metalozone  Echo 2/21 EF 60%, PA pressure 52 mm Hg, no significant valvulopathy  Received 40mg IV lasix on admission then transitioned to lasix GTT 10mg/hr on 2/8  Cardiology input appreciated  Low salt diet with 1800cc fluid restriction  Continue intake and output, daily weights  Consider addition of midodrine vs  PRN albumin with asymptomatic hypotension noted  Consider PRN zaroxylyn  Morbid (severe) obesity due to excess calories (HCC)  Assessment & Plan  BMI 51  Encourage lifestyle modifications  Currently being diuresed        Acute renal failure superimposed on stage 3b chronic kidney disease [facility])  Assessment & Plan  Lab Results   Component Value Date    EGFR 19 02/11/2022    EGFR 27 02/10/2022    EGFR 37 02/09/2022    CREATININE 3 21 (H) 02/11/2022    CREATININE 2 43 (H) 02/10/2022    CREATININE 1 87 (H) 02/09/2022     Renal function has fluctuated over the past 3-4 months but it appears his baseline creatinine is around 2  Now with worsening creatinine in the setting of IV diuresis, Post obstructive uropathy  Now with urinary catheter placed by IR  Monitor renal function closely with diuresis  Intake and output  Nephrology consult and ongoing support appreciated      Hyponatremia  Assessment & Plan  129 --> 127  Likely hypervolemic hyponatremia due to CHF exacerbation  Monitor Na level in setting of diuresis  Urine and serum osmolality pending  Permanent atrial fibrillation (HCC)  Assessment & Plan  Rate initially controlled on toprol XL - now increased likely multifactorial in the setting of anemia, recent ambulation, pulmonary edema with wheezing, volume overload  Anticoagulated with coumadin at home - Holding coumadin for now due to anemia and abdominal wall hematoma   INR trending down 1 5 --> 1 23  Given concern over ongoing acute bleeding - received 1 time dose of IV vitamin K 2/10/22  Hgb now remains stable  VTE Pharmacologic Prophylaxis:   Moderate Risk (Score 3-4) - Pharmacological DVT Prophylaxis Contraindicated  Sequential Compression Devices Ordered  Patient Centered Rounds: d/w nursing  Discussions with Specialists or Other Care Team Provider: d/w cardiology, IR    Education and Discussions with Family / Patient: Updated  (wife) via phone  Time Spent for Care: 30 minutes  More than 50% of total time spent on counseling and coordination of care as described above  Current Length of Stay: 3 day(s)  Current Patient Status: Inpatient   Certification Statement: The patient will continue to require additional inpatient hospital stay due to need for IV diuretics, close monitoring of labs  Discharge Plan: Anticipate discharge in >72 hrs to discharge location to be determined pending rehab evaluations  Code Status: Level 1 - Full Code      Subjective:   Patient is feeling weak overall  Having acute on chronic back pain  No dizziness or lightheadedness  No SOB, no CP  Objective:   Vitals:   Temp (24hrs), Av 5 °F (36 4 °C), Min:96 7 °F (35 9 °C), Max:98 2 °F (36 8 °C)    Temp:  [96 7 °F (35 9 °C)-98 2 °F (36 8 °C)] 96 7 °F (35 9 °C)  HR:  [100-120] 100  Resp:  [12-31] 21  BP: ()/(48-59) 92/48  SpO2:  [92 %-99 %] 99 %  Body mass index is 50 14 kg/m²       Input and Output Summary (last 24 hours): Intake/Output Summary (Last 24 hours) at 2/11/2022 1619  Last data filed at 2/11/2022 1502  Gross per 24 hour   Intake 1566 53 ml   Output 250 ml   Net 1316 53 ml       Physical Exam:   Physical Exam  Vitals and nursing note reviewed  Constitutional:       Appearance: He is ill-appearing (chronically ill appearing )  HENT:      Head: Normocephalic  Mouth/Throat:      Mouth: Mucous membranes are moist    Cardiovascular:      Rate and Rhythm: Normal rate and regular rhythm  Pulses: Normal pulses  Heart sounds: Normal heart sounds  Comments: anasarca  Pulmonary:      Effort: No respiratory distress  Breath sounds: No wheezing  Abdominal:      Tenderness: There is no abdominal tenderness  Comments: Obese with probable ascites, abdominal wall edema  Genitourinary:     Comments: Urinary catheter draining yellow urine  Skin:     Comments: Chronic venous stasis changes of the BLE with diffuse anasarca, BLE edema  Neurological:      Mental Status: He is oriented to person, place, and time  Psychiatric:         Mood and Affect: Mood normal           Additional Data:   Labs:  Results from last 7 days   Lab Units 02/11/22  1502 02/11/22  0641 02/11/22  0641   WBC Thousand/uL  --   --  6 53   HEMOGLOBIN g/dL 7 6*   < > 7 2*   HEMATOCRIT % 24 4*   < > 23 9*   PLATELETS Thousands/uL  --   --  170   NEUTROS PCT %  --   --  75   LYMPHS PCT %  --   --  8*   MONOS PCT %  --   --  9   EOS PCT %  --   --  6    < > = values in this interval not displayed       Results from last 7 days   Lab Units 02/11/22  0641 02/10/22  0636 02/09/22  0520   SODIUM mmol/L 127*   < > 129*   POTASSIUM mmol/L 3 9   < > 3 2*   CHLORIDE mmol/L 87*   < > 90*   CO2 mmol/L 34*   < > 35*   BUN mg/dL 90*   < > 80*   CREATININE mg/dL 3 21*   < > 1 87*   ANION GAP mmol/L 6   < > 4   CALCIUM mg/dL 8 2*   < > 8 0*   ALBUMIN g/dL  --   --  1 9*   TOTAL BILIRUBIN mg/dL  --   --  0 82   ALK PHOS U/L --   --  143*   ALT U/L  --   --  11*   AST U/L  --   --  22   GLUCOSE RANDOM mg/dL 153*   < > 141*    < > = values in this interval not displayed  Results from last 7 days   Lab Units 02/11/22  0846   INR  1 23*             Results from last 7 days   Lab Units 02/09/22  0520 02/08/22  1732   PROCALCITONIN ng/ml 0 55* 0 54*       Lines/Drains:  Invasive Devices  Report    Peripheral Intravenous Line            Peripheral IV 02/08/22 Right Antecubital 3 days    Peripheral IV 02/10/22 Right;Ventral (anterior) Forearm 1 day          Drain            Urethral Catheter Other (Comment) 10 2 Fr  <1 day              Urinary Catheter:  Goal for removal: consider voiding trial if ambulation improves, may need outpatient follow up however  Imaging: No pertinent imaging reviewed      Recent Cultures (last 7 days):   Results from last 7 days   Lab Units 02/08/22  1910 02/08/22  1732   SPUTUM CULTURE  4+ Growth of Staphylococcus aureus*  --    GRAM STAIN RESULT  1+ Epithelial cells per low power field*  1+ Polys*  3+ Gram positive cocci in pairs*  Rare Gram negative rods*  --    LEGIONELLA URINARY ANTIGEN   --  Negative       Last 24 Hours Medication List:   Current Facility-Administered Medications   Medication Dose Route Frequency Provider Last Rate    acetaminophen  650 mg Oral Q6H PRN Jose Gonzalez MD      albuterol  2 5 mg Nebulization Q6H PRN Jose Gonzalez MD      aluminum-magnesium hydroxide-simethicone  30 mL Oral Q4H PRN Jose Gonzalez MD      bumetanide  1 mg/hr Intravenous Continuous Jose Gonzalez MD 1 mg/hr (02/11/22 4515)    cefTRIAXone  1,000 mg Intravenous Q24H Jose Gonzalez MD 1,000 mg (02/11/22 0854)    influenza vaccine  0 5 mL Intramuscular Prior to discharge Daisy Tay MD      metoprolol succinate  25 mg Oral BID Kajal Alvares PA-C      midodrine  5 mg Oral TID SARA Contreras [facility] JAVAD bryant      oxyCODONE  10 mg Oral Q4H PRN Jose Gonzalez MD      oxyCODONE  5 mg Oral Q4H PRN Jose Cleveland MD      pantoprazole  40 mg Oral Early Morning Jose Cleveland MD      senna-docusate sodium  2 tablet Oral BID Jose Cleveland MD          Today, Patient Was Seen By: Filemon Torres PA-C    **Please Note: This note may have been constructed using a voice recognition system  **

## 2022-02-11 NOTE — OCCUPATIONAL THERAPY NOTE
Occupational Therapy         Patient Name: Niki Sweeney  MPYZC'[id number] Date: 2/11/2022        Pt attempted 2x for OT treatment this date  Pt declined OT treatment this AM requesting to complete in the afternoon  At this time patient attempted for OT treatment with patient off floor for testing

## 2022-02-11 NOTE — DISCHARGE INSTRUCTIONS
[address] [place] [address]  Interventional Radiology  Dr Josph Cooks: [phone] [address]: [phone]   Off hours or no answer/[id number] (Ask for IR on call)            Gann Catheter Placement and Care   WHAT Tawana:   A Gann catheter is a sterile tube that is inserted into your bladder to drain urine  It is also called an indwelling urinary catheter  DISCHARGE INSTRUCTIONS:   Return to the emergency department if:   · Your catheter comes out  · You suddenly have material that looks like sand in the tubing or drainage bag  · No urine is draining into the bag and you have checked the system  · You have pain in your hip, back, pelvis, or lower abdomen  · You are confused or cannot think clearly  Call your doctor or urologist if:   · You have a fever  · You have bladder spasms for more than 1 day after the catheter is placed  · You see blood in the tubing or drainage bag  · You have a rash or itching where the catheter tube is secured to your skin  · Urine leaks from or around the catheter, tubing, or drainage bag  · The closed drainage system has accidently come open or apart  · You see a layer of crystals inside the tubing  · You have questions or concerns about your condition or care  Care for your catheter and drainage bag: You can reduce your risk for infection and injury by caring for your catheter and drainage bag properly  · Wash your hands often  Wash before and after you touch your catheter, tubing, or drainage bag  Use soap and water  Wear clean disposable gloves when you care for your catheter or disconnect the drainage bag  Wash your hands before you prepare or eat food  · Clean your genital area 2 times every day  Clean your catheter area and anal opening after every bowel movement  ? For men:  Use a soapy cloth to clean the tip of your penis  Start where the catheter enters   Wipe backward making sure to pull back the foreskin  Then use a cloth with clear water in the same direction to clean away the soap  ? For women:  Use a soapy cloth to clean the area that the catheter enters your body  Make sure to separate your labia and wipe toward the anus  Then use a cloth with clear water and wipe in the same direction  · Secure the catheter tube  so you do not pull or move the catheter  This helps prevent pain and bladder spasms  Healthcare providers will show you how to use medical tape or a strap to secure the catheter tube to your body  · Keep a closed drainage system  Your catheter should always be attached to the drainage bag to form a closed system  Do not disconnect any part of the closed system unless you need to change the bag  · Keep the drainage bag below the level of your waist   This helps stop urine from moving back up the tubing and into your bladder  Do not loop or kink the tubing  This can cause urine to back up and collect in your bladder  Do not let the drainage bag touch or lie on the floor  · Empty the drainage bag when needed  The weight of a full drainage bag can be painful  Empty the drainage bag every 3 to 6 hours or when it is ? full  · Clean and change the drainage bag as directed  Ask your healthcare provider how often you should change the drainage bag and what cleaning solution to use  Wear disposable gloves when you change the bag  Do not allow the end of the catheter or tubing to touch anything  Clean the ends with an alcohol pad before you reconnect them  What to do if problems develop:   · No urine is draining into the bag:      ? Check for kinks in the tubing and straighten them out  ? Check the tape or strap used to secure the catheter tube to your skin  Make sure it is not blocking the tube  ? Make sure you are not sitting or lying on the tubing      ? Make sure the urine bag is hanging below the level of your waist     · Urine leaks from or around the catheter, tubing, or drainage bag:  Check if the closed drainage system has accidently come open or apart  Clean the catheter and tubing ends with a new alcohol pad and reconnect them  Follow up with your doctor or urologist as directed:  Write down your questions so you remember to ask them during your visits  © Copyright [address] 2021 Information is for End User's use only and may not be sold, redistributed or otherwise used for commercial purposes  All illustrations and images included in CareNotes® are the copyrighted property of [employer]  or [facility] [place]   The above information is an  only  It is not intended as medical advice for individual conditions or treatments  Talk to your doctor, nurse or pharmacist before following any medical regimen to see if it is safe and effective for you

## 2022-02-11 NOTE — ASSESSMENT & PLAN NOTE
Slightly increased in size since December  recent trauma noted  Continue to check H&H frequently - did receive 3 units PRBCs so far  Surgical consult appreciated, no intervention needed at this time, abdominal binder ordered, pain meds PRN  Hold anticoagulation - INR is trending down nicely  Continue to monitor closely

## 2022-02-11 NOTE — ASSESSMENT & PLAN NOTE
129 --> 127  Likely hypervolemic hyponatremia due to CHF exacerbation  Monitor Na level in setting of diuresis  Urine and serum osmolality pending

## 2022-02-12 NOTE — PROGRESS NOTES
Progress Note - Nephrology   Natividad Fresh 57 y o  male MRN: [id number]  Unit/Bed#:  Encounter: [id number]    Assessment and Plan    1  Hyponatremia  ? In the setting of volume overload, urinary retention  Serum sodium 127 millimole per L on 02/11/2022  Urine sodium 6, urine osmolality 312 millimole per kg  Etiology likely hypervolemia  ? Tighten fluid restriction to 1 5 L  Look to optimize diuresis  See below #2     2  Acute kidney injury, present on admission, superimposed on chronic kidney disease  · With presenting creatinine 1 9 mg/dL worsened to 3 2 mg/dL despite diuresis  · Fractional excretion of urea low at 7%, fractional excretion of sodium low at 0 09%  · Etiology:  § Suspect multifactorial in the setting of hemodynamic perturbations of hypotension and tachycardia, urinary retention, and low  FeNa and FeUrea consistent with volume overload and intravascular volume depletion of cardiorenal syndrome  · Plan:    · Look to optimize diuresis:  Add metolazone 10 mg daily, increase bumetanide drip from 1 milligram/hour to 1 5 milligram/hour, increase midodrine from 5 mg 3 times daily to 10 mg 3 times daily  Urinary drain in place  Strict monitoring of I&Os  · If patient does not increased urine output and respond to diuresis, he will likely require transfer for CVVH  I suspect he will not tolerate intermittent hemodialysis or ultrafiltration due to hypotension  · We discussed the possibility of dialysis, and patient is agreeable to the idea if required  Trend renal function  Continue to provide supportive care  Optimize hemodynamics  Maintain mean arterial pressure greater than 65 mmHg  Renally dose medications  Avoid nephrotoxins  Please discuss with renal any diuretics or possible nephrotoxic agents, such as NSAIDs or IV contrast  Recommend avoidance of PPIs in favor of H2 blocker, if appropriate for clinical scenario     Monitor for urinary retention- strict I&Os, record bladder scan PVRs and follow urinary retention protocol  3  Acute on chronic diastolic congestive heart failure  · Strict I&Os  Standing scale daily weights  2 g sodium restriction  1 8 L Fluid restriction  · On home furosemide 80 mg twice daily metolazone  Would consider transition to oral bumetanide or torsemide at discharge  · Increased diuresis as in #2     4  Acute on chronic anemia  · Known abdominal wall hematoma  General surgery is holding on IV contrast given risk of acute kidney injury requiring hemodialysis  To monitor serial H&H  Status post 1 unit PRBC since admission    5  Hypertension, now hypotensive  · Increase midodrine from 5 mg 3 times daily to 10 mg 3 times daily  6  Urinary retention  · Urinary drain per IR  Ideally, would request bladder scan to ensure bladder is emptying, but futile given body habitus  Monitor with need for midodrine  7  Lymphedema    8  Atrial fibrillation  · On metoprolol, Coumadin on hold given abdominal wall hematoma    9   Obstructive sleep apnea  · CPAP    Follow up reason for today's visit:  Acute kidney injury / hyponatremia / congestive heart failure    Acute on chronic diastolic (congestive) heart failure (HCC)    Patient Active Problem List   Diagnosis    Acute on chronic diastolic (congestive) heart failure (HCC)    Lymphedema    Chronic venous insufficiency    Varicose veins of both lower extremities with inflammation    Hypertensive heart and chronic kidney disease with heart failure and stage 1 through stage 4 chronic kidney disease, or chronic kidney disease (HCC)    STEFF (obstructive sleep apnea)    Pleural effusion, right    SOB (shortness of breath)    Cirrhosis of liver with ascites (HCC)    Tobacco abuse    Acute on chronic anemia    Thrombocytopenia (HCC)    Hypokalemia    Other cirrhosis of liver ([facility] [address] )    Encounter for screening for other viral diseases     Lactic acidosis    Permanent atrial fibrillation ([facility] [address] )    Diverticulosis of colon    Lesion of spleen    Chronic diastolic CHF (congestive heart failure) (HCC)    Tobacco use    Hyponatremia    Constipation    Pulmonary hypertension (HCC)    Vitamin D deficiency    Atelectasis    Anemia    Hypercalcemia    Acute kidney injury superimposed on chronic kidney disease (HCC)    Hypertensive nephrosclerosis    Acute renal failure superimposed on stage 3b chronic kidney disease (HCC)    Hypotension    Right wrist pain    Morbid (severe) obesity due to excess calories (HCC)    Smoking    Iron deficiency    Chronic right-sided congestive heart failure (HCC)    Severe sepsis with septic shock (HCC)    Panniculitis    Hypoalbuminemia    NSVT (nonsustained ventricular tachycardia) (HCC)    Pancytopenia (HCC)    Hyperphosphatemia    Elevated alkaline phosphatase level    Neutropenia (HCC)    Long term (current) use of anticoagulants    Acute blood loss anemia    Abdominal wall hematoma    Abnormal CT of the chest    RLL pneumonia    Urinary retention         Subjective:   Denies physical complaints besides feeling tired  Tells me he does not want to be transferred today because he needs to rest  A complete 10 point review of systems was performed and is otherwise negative  Objective:     Vitals: Blood pressure 95/56, pulse (!) 118, temperature (!) 96 6 °F (35 9 °C), temperature source Tympanic, resp  rate 18, height 6' (1 829 m), weight (!) 170 kg (374 lb 12 5 oz), SpO2 92 %  ,Body mass index is 50 83 kg/m²  Weight (last 2 days)     Date/Time Weight    02/12/22 0600 170 (374 78)    02/11/22 0554 168 (369 71)            Intake/Output Summary (Last 24 hours) at 2/12/2022 1036  Last data filed at 2/12/2022 0501  Gross per 24 hour   Intake 1006 67 ml   Output 550 ml   Net 456 67 ml     I/O last 3 completed shifts: In: 1853 2 [P O :1740;  I V :66 5; IV Piggyback:46 7]  Out: 550 [Urine:550]    Urethral Catheter Other (Comment) 10 2 Fr  (Active)   Reasons to continue Urinary Catheter  Acute urinary retention/obstruction failing urinary retention protocol 02/11/22 2100   Goal for Removal Will consult urology 02/11/22 2100   Site Assessment Clean;Skin intact 02/11/22 2100   Gann Care Done 02/12/22 0900   Collection Container Standard drainage bag 02/11/22 2100   Securement Method Securing device (Describe) 02/11/22 2100   Output (mL) 300 mL 02/12/22 0401       Physical Exam: BP 95/56   Pulse (!) 118   Temp (!) 96 6 °F (35 9 °C) (Tympanic)   Resp 18   Ht 6' (1 829 m)   Wt (!) 170 kg (374 lb 12 5 oz)   SpO2 92%   BMI 50 83 kg/m²     General Appearance:    No acute distress  Cooperative  Appears stated age  Morbidly obese   Head:    Normocephalic  Atraumatic  Normal jaw occlusion  Eyes:    Lids, conjunctiva normal  No scleral icterus  Ears:    Normal external ears  Nose:   Nares normal  No drainage  Mouth:   Lips, tongue normal  Mucosa normal  Phonation normal    Neck:   Supple  Symmetrical    Back:     Symmetric  No CVA tenderness  Lungs:     Normal respiratory effort  Clear to auscultation bilaterally  Chest wall:    No tenderness or deformity  Heart:    Regular rhythm  Tachycardic  Normal S1 and S2  No murmur  No JVD  Anasarca  Abdomen:     Soft  Non-tender  Bowel sounds active  Genitourinary:   No Gann catheter present  Urinary drain present with minimal amounts adonay urine and sediment  Extremities:   Extremities normal  Atraumatic  No cyanosis  Skin:   Warm and dry  No pallor, jaundice, rash, ecchymoses  Neurologic:   Alert and oriented to person, place, time  No focal deficit  Lab, Imaging and other studies: I have personally reviewed pertinent labs    CBC:   Lab Results   Component Value Date    WBC 6 02 02/12/2022    HGB 7 4 (L) 02/12/2022    HCT 24 4 (L) 02/12/2022    MCV 91 02/12/2022     02/12/2022    MCH 27 6 02/12/2022    MCHC 30 3 (L) 02/12/2022    RDW 17 2 (H) 02/12/2022    MPV 10 5 02/12/2022    NRBC 0 02/12/2022 CMP:   Lab Results   Component Value Date    K 3 8 02/12/2022    CL 89 (L) 02/12/2022    CO2 33 (H) 02/12/2022    BUN 89 (H) 02/12/2022    CREATININE 3 26 (H) 02/12/2022    CALCIUM 8 4 02/12/2022    EGFR 19 02/12/2022         Results from last 7 days   Lab Units 02/12/22  0430 02/11/22  0641 02/10/22  0636 02/09/22  0520 02/09/22  0520   POTASSIUM mmol/L 3 8 3 9 4 3   < > 3 2*   CHLORIDE mmol/L 89* 87* 89*   < > 90*   CO2 mmol/L 33* 34* 32   < > 35*   BUN mg/dL 89* 90* 84*   < > 80*   CREATININE mg/dL 3 26* 3 21* 2 43*   < > 1 87*   CALCIUM mg/dL 8 4 8 2* 8 1*   < > 8 0*   ALK PHOS U/L  --   --   --   --  143*   ALT U/L  --   --   --   --  11*   AST U/L  --   --   --   --  22    < > = values in this interval not displayed  Phosphorus: No results found for: PHOS  Magnesium: No results found for: MG  Urinalysis: No results found for: Arnoldo Jose, SPECGRAV, PHUR, LEUKOCYTESUR, NITRITE, PROTEINUA, GLUCOSEU, KETONESU, BILIRUBINUR, BLOODU  Ionized Calcium: No results found for: CAION  Coagulation: No results found for: PT, INR, APTT  Troponin: No results found for: TROPONINI  ABG: No results found for: PHART, XHZ6SRR, PO2ART, XIB3ZBP, N7BMIZOX, BEART, SOURCE  Radiology review:     IMAGING  Procedure: IR other    Result Date: 2/11/2022  Narrative: Examination: Retrograde urethrogram and fluoroscopically guided transurethral urinary catheter placement HISTORY: Retracted penis, increased body mass index  Unable to visualize penis or place catheter on the floor  On Bumex drip  Unable to quantitate output  TECHNIQUE: The patient was brought to radiology  Informed consent was obtained  The lower anterior abdomen was prepped and draped in the usual sterile fashion  Timeout was performed  Lidocaine gel was given as local anesthesia  I was unable to visualize the meatus or glands  I was unable to mechanically malik the penis   Contrast injection via the visible orifice outlined a 5 cm deep tract which then glans  I was able to visualize the midline of the glans fluoroscopically  Using fluoroscopic guidance, I used catheter and wire to cannulate the meatus and fossa navicularis  From there, contrast was injected  Urethrogram was performed  I advanced a wire up into the bladder  Contrast injection confirmed location  Attempted to advance a counselor catheter over the wire  It would not advance beyond the meatus  The catheter was exchanged for a 7-Malawian vascular sheath  A 2nd "maine "wire was placed for additional stiffness  This was a stiff glide  I attempted to advance the Gillett catheter over both wires  This was unsuccessful  Within that the smallest Gann catheter available in the building, a 16-Malawian  I could not advance this over the 2 body wires  At this point, an all-purpose drain was advanced over the wire and coiled in the bladder  The wires were removed  The cope loop retaining device was activated, and the tube showed good flow of urine  COMPARISON: None FINDINGS: Glans  and meatus are not directly visible, and cannot be everted  Urethra catheterizing retrograde fashion, and catheter placed  Possibility of needle stenosis exists  Impression: Technically successful retrograde urethrogram and urinary catheter placement using fluoroscopic guidance  This is the end of the clinically relevant portion of this report  Subsequent information is for compliance, documentation, and coding purposes  In the process of informed consent, information was communicated, verbally, to the patient regarding the procedure  The patient was informed of; the name of the procedure, nature of the procedure, nature of the condition, risks, benefits, alternatives, and potential complications, as well as the consequences of not having the procedure  All the patient's questions were answered  Informed consent was signed   Automated exposure control was utilized, and dose was minimized, in accordance with the application of the ALARA technique  Chlorhexidine and alcohol was used for cleansing and sterile preparation of the skin  This was allowed to dry prior to the application of sterile draping  Timeout was performed, with all team members present, and in agreement  Confirmation of patient, procedure, laterally, allergies, and all needed equipment was performed  The patient was examined, and is in satisfactory condition for planned moderate sedation   Mallampati score: 2 Fluoroscopy time: 9 3 Minutes CONTRAST DOSE:50 cc Omnipaque 300 PROCEDURE: See report title Preprocedure diagnosis: Please see "history ", above Postprocedure diagnosis: Same Antibiotics: None Specimen: None Estimated blood loss: Less than 5 mL Anesthesia: Local Workstation performed: [id number]       Current Facility-Administered Medications   Medication Dose Route Frequency    acetaminophen (TYLENOL) tablet 650 mg  650 mg Oral Q6H PRN    albuterol inhalation solution 2 5 mg  2 5 mg Nebulization Q6H PRN    bumetanide (BUMEX) 12 5 mg infusion 50 mL  1 5 mg/hr Intravenous Continuous    calcium carbonate (TUMS) chewable tablet 500 mg  500 mg Oral TID PRN    cefTRIAXone (ROCEPHIN) IVPB (premix in dextrose) 1,000 mg 50 mL  1,000 mg Intravenous Q24H    chlorhexidine (PERIDEX) 0 12 % oral rinse 15 mL  15 mL Swish & Spit Q12H [address]    influenza vaccine, recombinant, quadrivalent (FLUBLOK) IM injection 0 5 mL  0 5 mL Intramuscular Prior to discharge    linezolid (ZYVOX) IVPB (premix in dextrose) 600 mg 300 mL  600 mg Intravenous Q12H    metolazone (ZAROXOLYN) tablet 10 mg  10 mg Oral Daily    metoprolol succinate (TOPROL-XL) 24 hr tablet 25 mg  25 mg Oral BID    midodrine (PROAMATINE) tablet 5 mg  5 mg Oral TID AC    mupirocin (BACTROBAN) 2 % nasal ointment   Nasal Q12H [address]    oxyCODONE (ROXICODONE) immediate release tablet 10 mg  10 mg Oral Q4H PRN    oxyCODONE (ROXICODONE) IR tablet 5 mg  5 mg Oral Q4H PRN    pantoprazole (PROTONIX) EC tablet 40 mg  40 mg Oral Early Morning    senna-docusate sodium (SENOKOT S) 8 6-50 mg per tablet 2 tablet  2 tablet Oral BID     Medications Discontinued During This Encounter   Medication Reason    cefTRIAXone (ROCEPHIN) IVPB (premix in dextrose) 1,000 mg 50 mL     albumin human (FLEXBUMIN) 25 % injection 25 g     aspirin 325 mg tablet     furosemide (LASIX) 40 mg tablet Duplicate order    influenza vaccine, recombinant, quadrivalent (FLUBLOK) IM injection 0 5 mL     HYDROmorphone (DILAUDID) injection 0 5 mg     azithromycin (ZITHROMAX) 500 mg in sodium chloride 0 9 % 250 mL IVPB     furosemide (LASIX) 500 mg infusion 50 mL     simethicone (MYLICON) chewable tablet 80 mg     potassium chloride (K-DUR,KLOR-CON) CR tablet 40 mEq     metoprolol succinate (TOPROL-XL) 24 hr tablet 25 mg     aluminum-magnesium hydroxide-simethicone (MYLANTA) oral suspension 30 mL        Adeline Abad PA-C    Portions of the record may have been created with voice recognition software  Occasional wrong word or "sound a like" substitutions may have occurred due to the inherent limitations of voice recognition software  Read the chart carefully and recognize, using context, where substitutions have occurred

## 2022-02-12 NOTE — ASSESSMENT & PLAN NOTE
Wt Readings from Last 3 Encounters:   02/12/22 (!) 170 kg (374 lb 12 5 oz)   12/29/21 (!) 152 kg (336 lb)   12/14/21 (!) 152 kg (335 lb 1 6 oz)     With diffuse anasarca  Prior discharge weight is closer to 340lbs, approximately 30-40 lbs up from dry weight  Chronic diuretic regimen is lasix 120mg BID with PRN metalozone  Echo 2/21 EF 60%, PA pressure 52 mm Hg, no significant valvulopathy  Received 40mg IV lasix on admission then transitioned to lasix GTT 10mg/hr on 2/8  Cardiology input appreciated  Low salt diet with 1800cc fluid restriction  Continue intake and output, daily weights  With continued poor output, 5lb weight gain and worsening kidney function on 2/12, discussed CVVH with nephrology  With increase in Bumex drip and addition of metolazone, patient only had 75 ml output in 4-5 hours  Nephrology requested transfer for CVVH at this time   Will be going to [place] [facility] for this

## 2022-02-12 NOTE — ASSESSMENT & PLAN NOTE
Lab Results   Component Value Date    EGFR 19 02/12/2022    EGFR 19 02/11/2022    EGFR 27 02/10/2022    CREATININE 3 26 (H) 02/12/2022    CREATININE 3 21 (H) 02/11/2022    CREATININE 2 43 (H) 02/10/2022     Renal function has fluctuated over the past 3-4 months but it appears his baseline creatinine is around 2  Now with worsening creatinine in the setting of IV diuresis, Post obstructive uropathy  Now with urinary catheter placed by IR  Monitor renal function closely with diuresis  Intake and output  Nephrology consult and ongoing support appreciated

## 2022-02-12 NOTE — ASSESSMENT & PLAN NOTE
Acute urinary retention noted, no output in 24 hours per nursing  D/w urology  IR available to place urinary catheter - difficulty with placement due to 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  IR successfully placed trans urethral urinary catheter  Likely contributing to MARISABEL    Still having poor urine output despite this, likely needs further diuresis vs CVVH

## 2022-02-12 NOTE — PROGRESS NOTES
[address] [facility] [address] [place]  Progress Note - Sami Lan 1960, 57 y o  male MRN: [id number]  Unit/Bed#:  Encounter: [id number]  Primary Care Provider: Qiana Samuel DO   Date and time admitted to hospital: 2/8/2022 12:45 PM    * Acute on chronic diastolic (congestive) heart failure [facility])  Assessment & Plan  Wt Readings from Last 3 Encounters:   02/12/22 (!) 170 kg (374 lb 12 5 oz)   12/29/21 (!) 152 kg (336 lb)   12/14/21 (!) 152 kg (335 lb 1 6 oz)     With diffuse anasarca  Prior discharge weight is closer to 340lbs, approximately 30-40 lbs up from dry weight  Chronic diuretic regimen is lasix 120mg BID with PRN metalozone  Echo 2/21 EF 60%, PA pressure 52 mm Hg, no significant valvulopathy  Received 40mg IV lasix on admission then transitioned to lasix GTT 10mg/hr on 2/8  Cardiology input appreciated  Low salt diet with 1800cc fluid restriction  Continue intake and output, daily weights  With continued poor output, 5lb weight gain and worsening kidney function on 2/12, discussed CVVH with nephrology  Will attempt increase in midodrine and increase in Bumex with addition of metolazone today and if still poor output and worsening kidney function can consider transfer for CVVH 2/13      Abdominal wall hematoma  Assessment & Plan  Slightly increased in size since December  recent trauma noted  Continue to check H&H frequently - did receive 3 units PRBCs so far  Surgical consult appreciated, no intervention needed at this time, abdominal binder ordered, pain meds PRN  Hold anticoagulation - INR is trending down nicely  Continue to monitor closely  Acute on chronic anemia  Assessment & Plan  Acute on chronic in the setting of chronic disease, Previous baseline hemoglobin is 7-8  Anemia is also in in the setting of rectus sheath hematoma, prior history of AVMs per GI  Also anticoagulated on Coumadin which presents an issue with acute bleeding    Now also hypotensive, tachycardic  S/p total 3 units PRBCs, last transfusion 2/11, Hgb appropriately improved from 6 8 --> 7 7  Remaining stable at this range currently     Continue to monitor H/H closely  Continue to Transfuse for hgb < 7  Follow hemoccult testing, consider further GI workup if needed  D/w GI possible inpatient EGD / colonoscopy Tuesday if anemia continues, hemoccult positive  Transfered to ICU for closer hemodynamic monitoring in the setting of possible acute bleed  General surgery aware  Cirrhosis of liver with ascites [facility])  Assessment & Plan  Due a history of alcohol use  Now with abdominal ascites on exam   Consulted IR for possible paracentesis and labs  Low volume ascites noted, not worth risk  GI consult appreciated, recommend outpatient EGD   May need to consider possible inpatient EGD, colonoscopy if hemoccult testing positive  D/w GI  MELD 24 (INR is in the setting of coumadin use however)  Acute renal failure superimposed on stage 3b chronic kidney disease [facility])  Assessment & Plan  Lab Results   Component Value Date    EGFR 19 02/12/2022    EGFR 19 02/11/2022    EGFR 27 02/10/2022    CREATININE 3 26 (H) 02/12/2022    CREATININE 3 21 (H) 02/11/2022    CREATININE 2 43 (H) 02/10/2022     Renal function has fluctuated over the past 3-4 months but it appears his baseline creatinine is around 2  Now with worsening creatinine in the setting of IV diuresis, Post obstructive uropathy  Now with urinary catheter placed by IR  Monitor renal function closely with diuresis  Intake and output  Nephrology consult and ongoing support appreciated      RLL pneumonia  Assessment & Plan  CT chest showing concern for RLL pneumonia  procalcitonin x 2 positive at 0 5 however could be elevated due to CKD --> procal continues to rise  Urinary antigens negative, discontinue azithromycin  Continue IV ceftriaxone - day #5, DC due to sputum culture growing MRSA  WBC remains normal, afebrile  Check sputum culture - growing staphylococcus and MRSA  Discussed with ID can place on linezolid given poor kidney function at this time preventing us from using vancomycin  Added respiratory protocol, incentive spirometry      Urinary retention  Assessment & Plan  Acute urinary retention noted, no output in 24 hours per nursing  D/w urology  IR available to place urinary catheter - difficulty with placement due to 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  IR successfully placed trans urethral urinary catheter  Likely contributing to MARISABEL  Consider inpatient vs  Outpatient voiding trial   Still having poor urine output despite this, likely needs further diuresis vs CVVH    Hyponatremia  Assessment & Plan  129 --> 127 --> 128  No confusion noted  Likely hypervolemic hyponatremia due to CHF exacerbation  Monitor Na level in setting of diuresis  Permanent atrial fibrillation (HCC)  Assessment & Plan  Rate initially controlled on toprol XL - now increased likely multifactorial in the setting of anemia, recent ambulation, pulmonary edema with wheezing, volume overload  Anticoagulated with coumadin at home - Holding coumadin for now due to anemia and abdominal wall hematoma   INR trending down 1 5 --> 1 23  Given concern over ongoing acute bleeding - received 1 time dose of IV vitamin K 2/10/22  Hgb now remains stable  Unfortunately, due to soft BP unable to receive his last 2 doses of metoprolol and HR are increased around 110 currently, midodrine increased to attempt to optimize BP        VTE Pharmacologic Prophylaxis: VTE Score: 4 Moderate Risk (Score 3-4) - Pharmacological DVT Prophylaxis Contraindicated  Sequential Compression Devices Ordered  Patient Centered Rounds: I performed bedside rounds with nursing staff today  Discussions with Specialists or Other Care Team Provider: case management, nephrology    Education and Discussions with Family / Patient: Patient declined call to        Time Spent for Care: 30 minutes  More than 50% of total time spent on counseling and coordination of care as described above  Current Length of Stay: 4 day(s)  Current Patient Status: Inpatient   Certification Statement: The patient will continue to require additional inpatient hospital stay due to continued IV diuresis, monitoring of blood counts and kidney function  Discharge Plan: Anticipate discharge in >72 hrs to rehab facility  Code Status: Level 1 - Full Code    Subjective:   Patient reports that he continues to feel volume overloaded altough his pain has begun to subside  He understands if he needs CVVH he would need transfer but prefers not to go to [place]  Objective:     Vitals:   Temp (24hrs), Av °F (36 1 °C), Min:96 6 °F (35 9 °C), Max:97 3 °F (36 3 °C)    Temp:  [96 6 °F (35 9 °C)-97 3 °F (36 3 °C)] 96 6 °F (35 9 °C)  HR:  [100-118] 117  Resp:  [11-26] 17  BP: ()/(50-73) 87/53  SpO2:  [91 %-97 %] 92 %  Body mass index is 50 83 kg/m²  Input and Output Summary (last 24 hours): Intake/Output Summary (Last 24 hours) at 2022 1402  Last data filed at 2022 1204  Gross per 24 hour   Intake 766 67 ml   Output 725 ml   Net 41 67 ml       Physical Exam:   Physical Exam  Vitals and nursing note reviewed  Constitutional:       General: He is not in acute distress  Appearance: He is obese  He is ill-appearing  HENT:      Head: Normocephalic and atraumatic  Cardiovascular:      Rate and Rhythm: Tachycardia present  Rhythm irregular  Pulses: Normal pulses  Heart sounds: Normal heart sounds  No murmur heard  No gallop  Pulmonary:      Effort: Pulmonary effort is normal       Breath sounds: Normal breath sounds  No wheezing, rhonchi or rales  Abdominal:      General: Bowel sounds are normal  There is distension  Palpations: Abdomen is soft  Tenderness: There is no abdominal tenderness  There is no guarding or rebound     Musculoskeletal:      Right lower leg: Edema present  Left lower leg: Edema present  Skin:     General: Skin is warm and dry  Neurological:      General: No focal deficit present  Mental Status: He is alert and oriented to person, place, and time  Psychiatric:         Mood and Affect: Mood normal          Behavior: Behavior normal           Additional Data:     Labs:  Results from last 7 days   Lab Units 02/12/22  0430   WBC Thousand/uL 6 02   HEMOGLOBIN g/dL 7 4*   HEMATOCRIT % 24 4*   PLATELETS Thousands/uL 178   NEUTROS PCT % 76*   LYMPHS PCT % 7*   MONOS PCT % 10   EOS PCT % 5     Results from last 7 days   Lab Units 02/12/22  0430 02/10/22  0636 02/09/22  0520   SODIUM mmol/L 128*   < > 129*   POTASSIUM mmol/L 3 8   < > 3 2*   CHLORIDE mmol/L 89*   < > 90*   CO2 mmol/L 33*   < > 35*   BUN mg/dL 89*   < > 80*   CREATININE mg/dL 3 26*   < > 1 87*   ANION GAP mmol/L 6   < > 4   CALCIUM mg/dL 8 4   < > 8 0*   ALBUMIN g/dL  --   --  1 9*   TOTAL BILIRUBIN mg/dL  --   --  0 82   ALK PHOS U/L  --   --  143*   ALT U/L  --   --  11*   AST U/L  --   --  22   GLUCOSE RANDOM mg/dL 122   < > 141*    < > = values in this interval not displayed  Results from last 7 days   Lab Units 02/11/22  0846   INR  1 23*             Results from last 7 days   Lab Units 02/09/22  0520 02/08/22  1732   PROCALCITONIN ng/ml 0 55* 0 54*       Lines/Drains:  Invasive Devices  Report    Peripheral Intravenous Line            Peripheral IV 02/08/22 Right Antecubital 4 days    Peripheral IV 02/10/22 Right;Ventral (anterior) Forearm 2 days          Drain            Urethral Catheter Other (Comment) 10 2 Fr  <1 day              Urinary Catheter:  Goal for removal: Remove after 48 hrs of I/O monitoring               Imaging: No pertinent imaging reviewed      Recent Cultures (last 7 days):   Results from last 7 days   Lab Units 02/08/22  1910 02/08/22  1732   SPUTUM CULTURE  4+ Growth of Staphylococcus aureus*  4+ Growth of Methicillin Resistant Staphylococcus aureus*  2+ Growth of   --    GRAM STAIN RESULT  1+ Epithelial cells per low power field*  1+ Polys*  3+ Gram positive cocci in pairs*  Rare Gram negative rods*  --    LEGIONELLA URINARY ANTIGEN   --  Negative       Last 24 Hours Medication List:   Current Facility-Administered Medications   Medication Dose Route Frequency Provider Last Rate    acetaminophen  650 mg Oral Q6H PRN Jose Marques MD      albuterol  2 5 mg Nebulization Q6H PRN Jose Marques MD      bumetanide  1 5 mg/hr Intravenous Continuous Skip Zane Pitts PA-C Stopped (02/12/22 1344)    calcium carbonate  500 mg Oral TID PRN Lashell Ayala PA-C      chlorhexidine  15 mL [facility]it [facility]12H [facility]      influenza vaccine  0 5 mL Intramuscular Prior to discharge Elio Collier MD      linezolid  600 mg Intravenous Q12H Lashell Ayala PA-C      metolazone  10 mg Oral Daily Skip [place], [place]      metoprolol succinate  25 mg Oral BID Allyson Gale PA-C      midodrine  10 mg Oral TID AC Sammie Kirby PA-C      mupirocin   Nasal Q12H [facility] Lashell Ayala PA-C      oxyCODONE  10 mg Oral Q4H PRN Elio Collier MD      oxyCODONE  5 mg Oral Q4H PRN Jose Marques MD      pantoprazole  40 mg Oral Early Morning Jose Marques MD      senna-docusate sodium  2 tablet Oral BID Jose Marques MD          Today, Patient Was Seen By: Lashell Ayala PA-C    **Please Note: This note may have been constructed using a voice recognition system  **

## 2022-02-12 NOTE — ASSESSMENT & PLAN NOTE
129 --> 127 --> 128  No confusion noted  Likely hypervolemic hyponatremia due to CHF exacerbation  Monitor Na level in setting of diuresis    Urine osmolality normal at 312, pending serum osmolality

## 2022-02-12 NOTE — ASSESSMENT & PLAN NOTE
129 --> 127 --> 128  No confusion noted  Likely hypervolemic hyponatremia due to CHF exacerbation  Monitor Na level in setting of diuresis

## 2022-02-12 NOTE — PLAN OF CARE
Problem: Potential for Falls  Goal: Patient will remain free of falls  Description: INTERVENTIONS:  - Educate patient/family on patient safety including physical limitations  - Instruct patient to call for assistance with activity   - Consult OT/PT to assist with strengthening/mobility   - Keep Call bell within reach  - Keep bed low and locked with side rails adjusted as appropriate  - Keep care items and personal belongings within reach  - Initiate and maintain comfort rounds  - Make Fall Risk Sign visible to staff  - Offer Toileting every 1-2 Hours, in advance of need  - Initiate/Maintain bed and chair alarm  - Obtain necessary fall risk management equipment: fall socks  - Apply yellow socks and bracelet for high fall risk patients  - Consider moving patient to room near nurses station  Outcome: Progressing     Problem: MOBILITY - ADULT  Goal: Maintain or return to baseline ADL function  Description: INTERVENTIONS:  - Educate patient/family on patient safety including physical limitations  - Instruct patient to call for assistance with activity   - Consult OT/PT to assist with strengthening/mobility   - Keep Call bell within reach  - Keep bed low and locked with side rails adjusted as appropriate  - Keep care items and personal belongings within reach  - Initiate and maintain comfort rounds  - Make Fall Risk Sign visible to staff  - Offer Toileting every 1-2 Hours, in advance of need  - Initiate/Maintain bed and chair alarm  - Obtain necessary fall risk management equipment: fall socks  - Apply yellow socks and bracelet for high fall risk patients  - Consider moving patient to room near nurses station  Outcome: Progressing  Goal: Maintains/Returns to pre admission functional level  Description: INTERVENTIONS:  - Educate patient/family on patient safety including physical limitations  - Instruct patient to call for assistance with activity   - Consult OT/PT to assist with strengthening/mobility   - Keep Call bell within reach  - Keep bed low and locked with side rails adjusted as appropriate  - Keep care items and personal belongings within reach  - Initiate and maintain comfort rounds  - Make Fall Risk Sign visible to staff  - Offer Toileting every 1-2 Hours, in advance of need  - Initiate/Maintain bed and chair alarm  - Obtain necessary fall risk management equipment: fall socks  - Apply yellow socks and bracelet for high fall risk patients  - Consider moving patient to room near nurses station  Outcome: Progressing     Problem: PAIN - ADULT  Goal: Verbalizes/displays adequate comfort level or baseline comfort level  Description: Interventions:  - Encourage patient to monitor pain and request assistance  - Assess pain using appropriate pain scale  - Administer analgesics based on type and severity of pain and evaluate response  - Implement non-pharmacological measures as appropriate and evaluate response  - Consider cultural and social influences on pain and pain management  - Notify physician/advanced practitioner if interventions unsuccessful or patient reports new pain  Outcome: Progressing     Problem: INFECTION - ADULT  Goal: Absence or prevention of progression during hospitalization  Description: INTERVENTIONS:  - Assess and monitor for signs and symptoms of infection  - Monitor lab/diagnostic results  - Monitor all insertion sites, i e  indwelling lines, tubes, and drains  - Administer medications as ordered  - Instruct and encourage patient and family to use good hand hygiene technique  - Identify and instruct in appropriate isolation precautions for identified infection/condition  Outcome: Progressing     Problem: SAFETY ADULT  Goal: Patient will remain free of falls  Description: INTERVENTIONS:  - Educate patient/family on patient safety including physical limitations  - Instruct patient to call for assistance with activity   - Consult OT/PT to assist with strengthening/mobility   - Keep Call bell within reach  - Keep bed low and locked with side rails adjusted as appropriate  - Keep care items and personal belongings within reach  - Initiate and maintain comfort rounds  - Make Fall Risk Sign visible to staff  - Offer Toileting every 1-2 Hours, in advance of need  - Initiate/Maintain bed and chair alarm  - Obtain necessary fall risk management equipment: fall socks  - Apply yellow socks and bracelet for high fall risk patients  - Consider moving patient to room near nurses station  Outcome: Progressing  Goal: Maintain or return to baseline ADL function  Description: INTERVENTIONS:  - Educate patient/family on patient safety including physical limitations  - Instruct patient to call for assistance with activity   - Consult OT/PT to assist with strengthening/mobility   - Keep Call bell within reach  - Keep bed low and locked with side rails adjusted as appropriate  - Keep care items and personal belongings within reach  - Initiate and maintain comfort rounds  - Make Fall Risk Sign visible to staff  - Offer Toileting every 1-2 Hours, in advance of need  - Initiate/Maintain bed and chair alarm  - Obtain necessary fall risk management equipment: fall socks  - Apply yellow socks and bracelet for high fall risk patients  - Consider moving patient to room near nurses station  Outcome: Progressing  Goal: Maintains/Returns to pre admission functional level  Description: INTERVENTIONS:  - Educate patient/family on patient safety including physical limitations  - Instruct patient to call for assistance with activity   - Consult OT/PT to assist with strengthening/mobility   - Keep Call bell within reach  - Keep bed low and locked with side rails adjusted as appropriate  - Keep care items and personal belongings within reach  - Initiate and maintain comfort rounds  - Make Fall Risk Sign visible to staff  - Offer Toileting every 1-2 Hours, in advance of need  - Initiate/Maintain bed and chair alarm  - Obtain necessary fall risk management equipment: fall socks  - Apply yellow socks and bracelet for high fall risk patients  - Consider moving patient to room near nurses station  Outcome: Progressing     Problem: DISCHARGE PLANNING  Goal: Discharge to home or other facility with appropriate resources  Description: INTERVENTIONS:  - Identify barriers to discharge w/patient and caregiver  - Arrange for needed discharge resources and transportation as appropriate  - Identify discharge learning needs (meds, wound care, etc )  - Refer to Case Management Department for coordinating discharge planning if the patient needs post-hospital services based on physician/advanced practitioner order or complex needs related to functional status, cognitive ability, or social support system  Outcome: Progressing     Problem: Knowledge Deficit  Goal: Patient/family/caregiver demonstrates understanding of disease process, treatment plan, medications, and discharge instructions  Description: Complete learning assessment and assess knowledge base    Interventions:  - Provide teaching at level of understanding  - Provide teaching via preferred learning methods  Outcome: Progressing     Problem: Prexisting or High Potential for Compromised Skin Integrity  Goal: Skin integrity is maintained or improved  Description: INTERVENTIONS:  - Identify patients at risk for skin breakdown  - Assess and monitor skin integrity  - Assess and monitor nutrition and hydration status  - Monitor labs   - Assess for incontinence   - Turn and reposition patient  - Assist with mobility/ambulation  - Relieve pressure over bony prominences  - Avoid friction and shearing  - Provide appropriate hygiene as needed including keeping skin clean and dry  - Evaluate need for skin moisturizer/barrier cream  - Collaborate with interdisciplinary team   - Patient/family teaching  - Consider wound care consult   Outcome: Progressing     Problem: CARDIOVASCULAR - ADULT  Goal: Maintains optimal cardiac output and hemodynamic stability  Description: INTERVENTIONS:  - Monitor I/O, vital signs and rhythm  - Monitor for S/S and trends of decreased cardiac output  - Administer and titrate ordered vasoactive medications to optimize hemodynamic stability  - Assess quality of pulses, skin color and temperature  - Assess for signs of decreased coronary artery perfusion  - Instruct patient to report change in severity of symptoms  Outcome: Progressing  Goal: Absence of cardiac dysrhythmias or at baseline rhythm  Description: INTERVENTIONS:  - Continuous cardiac monitoring, vital signs, obtain 12 lead EKG if ordered  - Administer antiarrhythmic and heart rate control medications as ordered  - Monitor electrolytes and administer replacement therapy as ordered  Outcome: Progressing     Problem: RESPIRATORY - ADULT  Goal: Achieves optimal ventilation and oxygenation  Description: INTERVENTIONS:  - Assess for changes in respiratory status  - Assess for changes in mentation and behavior  - Position to facilitate oxygenation and minimize respiratory effort  - Oxygen administered by appropriate delivery if ordered  - Initiate smoking cessation education as indicated  - Encourage broncho-pulmonary hygiene including cough, deep breathe, Incentive Spirometry  - Assess the need for suctioning and aspirate as needed  - Assess and instruct to report SOB or any respiratory difficulty  - Respiratory Therapy support as indicated  Outcome: Progressing     Problem: METABOLIC, FLUID AND ELECTROLYTES - ADULT  Goal: Electrolytes maintained within normal limits  Description: INTERVENTIONS:  - Monitor labs and assess patient for signs and symptoms of electrolyte imbalances  - Administer electrolyte replacement as ordered  - Monitor response to electrolyte replacements, including repeat lab results as appropriate  - Instruct patient on fluid and nutrition as appropriate  Outcome: Progressing  Goal: Fluid balance maintained  Description: INTERVENTIONS:  - Monitor labs   - Monitor I/O and WT  - Instruct patient on fluid and nutrition as appropriate  - Assess for signs & symptoms of volume excess or deficit  Outcome: Progressing  Goal: Glucose maintained within target range  Description: INTERVENTIONS:  - Monitor Blood Glucose as ordered  - Assess for signs and symptoms of hyperglycemia and hypoglycemia  - Administer ordered medications to maintain glucose within target range  - Assess nutritional intake and initiate nutrition service referral as needed  Outcome: Progressing     Problem: HEMATOLOGIC - ADULT  Goal: Maintains hematologic stability  Description: INTERVENTIONS  - Assess for signs and symptoms of bleeding or hemorrhage  - Monitor labs  - Administer supportive blood products/factors as ordered and appropriate  Outcome: Progressing

## 2022-02-12 NOTE — DISCHARGE SUMMARY
[facility] [facility] [address] [place]  Discharge- Yolanda Churchill 1960, 57 y o  male MRN: [id number]  Unit/Bed#:  Encounter: [id number]  Primary Care Provider: Concepcion Alva DO   Date and time admitted to hospital: 2/8/2022 12:45 PM    * Acute on chronic diastolic (congestive) heart failure [facility])  Assessment & Plan  Wt Readings from Last 3 Encounters:   02/12/22 (!) 170 kg (374 lb 12 5 oz)   12/29/21 (!) 152 kg (336 lb)   12/14/21 (!) 152 kg (335 lb 1 6 oz)     With diffuse anasarca  Prior discharge weight is closer to 340lbs, approximately 30-40 lbs up from dry weight  Chronic diuretic regimen is lasix 120mg BID with PRN metalozone  Echo 2/21 EF 60%, PA pressure 52 mm Hg, no significant valvulopathy  Received 40mg IV lasix on admission then transitioned to lasix GTT 10mg/hr on 2/8  Cardiology input appreciated  Low salt diet with 1800cc fluid restriction  Continue intake and output, daily weights  With continued poor output, 5lb weight gain and worsening kidney function on 2/12, discussed CVVH with nephrology  With increase in Bumex drip and addition of metolazone, patient only had 75 ml output in 4-5 hours  Nephrology requested transfer for CVVH at this time  Will be going to [facility] for this      Abdominal wall hematoma  Assessment & Plan  Slightly increased in size since December  recent trauma noted  Continue to check H&H frequently - did receive 3 units PRBCs so far  Surgical consult appreciated, no intervention needed at this time, abdominal binder ordered, pain meds PRN  Hold anticoagulation - INR is trending down nicely  Continue to monitor closely  Acute on chronic anemia  Assessment & Plan  Acute on chronic in the setting of chronic disease, Previous baseline hemoglobin is 7-8  Anemia is also in in the setting of rectus sheath hematoma, prior history of AVMs per GI  Also anticoagulated on Coumadin which presents an issue with acute bleeding    Now also hypotensive, tachycardic  S/p total 3 units PRBCs, last transfusion 2/11, Hgb appropriately improved from 6 8 --> 7 7  Remaining stable at this range currently     Continue to monitor H/H closely  Continue to Transfuse for hgb < 7  Follow hemoccult testing, consider further GI workup if needed  D/w GI possible inpatient EGD / colonoscopy Tuesday if anemia continues, hemoccult positive  Transfered to ICU for closer hemodynamic monitoring in the setting of possible acute bleed  General surgery aware  Cirrhosis of liver with ascites [facility])  Assessment & Plan  Due a history of alcohol use  Now with abdominal ascites on exam   Consulted IR for possible paracentesis and labs  Low volume ascites noted, not worth risk  GI consult appreciated, recommend outpatient EGD   May need to consider possible inpatient EGD, colonoscopy if hemoccult testing positive  D/w GI  MELD 24 (INR is in the setting of coumadin use however)  Acute renal failure superimposed on stage 3b chronic kidney disease [facility])  Assessment & Plan  Lab Results   Component Value Date    EGFR 19 02/12/2022    EGFR 19 02/11/2022    EGFR 27 02/10/2022    CREATININE 3 26 (H) 02/12/2022    CREATININE 3 21 (H) 02/11/2022    CREATININE 2 43 (H) 02/10/2022     Renal function has fluctuated over the past 3-4 months but it appears his baseline creatinine is around 2  Now with worsening creatinine in the setting of IV diuresis, Post obstructive uropathy  Now with urinary catheter placed by IR  Monitor renal function closely with diuresis  Intake and output  Nephrology consult and ongoing support appreciated   CVVH planned with alvin to [facility]    RLL pneumonia  Assessment & Plan  CT chest showing concern for RLL pneumonia  procalcitonin x 2 positive at 0 5 however could be elevated due to CKD --> procal continues to rise  Urinary antigens negative, discontinue azithromycin  Continue IV ceftriaxone - day #5, DC due to sputum culture growing MRSA  WBC remains normal, afebrile  Check sputum culture - growing staphylococcus and MRSA  Discussed with ID can place on linezolid given poor kidney function at this time preventing us from using vancomycin  MRSA colonization protocol with CHG baths, mupirocin nares BID and chg mouthwash daily  Added respiratory protocol, incentive spirometry      Urinary retention  Assessment & Plan  Acute urinary retention noted, no output in 24 hours per nursing  D/w urology  IR available to place urinary catheter - difficulty with placement due to 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  IR successfully placed trans urethral urinary catheter  Likely contributing to MARISABEL  Still having poor urine output despite this, likely needs further diuresis vs CVVH    Hyponatremia  Assessment & Plan  129 --> 127 --> 128  No confusion noted  Likely hypervolemic hyponatremia due to CHF exacerbation  Monitor Na level in setting of diuresis  Urine osmolality normal at 312, pending serum osmolality    Permanent atrial fibrillation (HCC)  Assessment & Plan  Rate initially controlled on toprol XL - now increased likely multifactorial in the setting of anemia, recent ambulation, pulmonary edema with wheezing, volume overload  Anticoagulated with coumadin at home - Holding coumadin for now due to anemia and abdominal wall hematoma   INR trending down 1 5 --> 1 23  Given concern over ongoing acute bleeding - received 1 time dose of IV vitamin K 2/10/22  Hgb now remains stable      Unfortunately, due to soft BP unable to receive his last 2 doses of metoprolol and HR are increased around 110 currently, midodrine increased to attempt to optimize BP      Medical Problems             Resolved Problems  Date Reviewed: 2/12/2022    None              Discharging Physician / Practitioner: Scott Mc PA-C  PCP: Promise Nixon DO  Admission Date:   Admission Orders (From admission, onward)     Ordered        02/08/22 1502  Inpatient Admission  Once Discharge/Transfer Date: 02/12/22    Disposition:   Transfer to: [facility]  Reason for Transfer: need for CVVH  Accepting Provider at [facility] [place]: Dr Jean Paul Paz Stay:  · Cardiology  · Nephrology  · Gastroenterology  · IR  · surgery    Procedures Performed:   · Transurethral leahy     Significant Findings / Test Results:   · CXR Cardiomegaly, small right pleural effusion and right lower lobe airspace opacity  · CT head No acute intracranial abnormality  · CT spine No cervical spine fracture or traumatic malalignment  Cervical lymphadenopathy  · CT chest   Left abdominal wall hematoma is mildly increased from December 2021  Mediastinal/left neck base lymphadenopathy, consider follow-up neck and chest CT in 3 months to evaluate for persistence  Right lower lobe airspace opacities, concerning for pneumonia  Anasarca  Small right pleural effusion  Cirrhotic morphology with ascites  Incidental Findings:   · As above    Test Results Pending at Discharge (will require follow up):   · none     Outpatient Tests Requested:  · none    Complications:  none    Reason for Admission: acute CHF exacerbation    Hospital Course:   Emma Del Angel is a 57 y o  male patient who originally presented to the hospital on 2/8/2022 due to having a mechanical fall at home and request per visiting nurse for patient to come to the hospital  Patient was tying up his dog in the kitchen when he tripped over the leash and landed on his right side  No loss of consciousness  Know to fall frequently and known well to our PT team who often recommend rehab for this patient  Patient's nurse also noted lower extremity and abdominal edema  Cardiology had been attempting to optimize diuretic regimen outpatient  Patient also noted to have increase in cough and sputum production at home  On imaging, patient found to have pneumonia on chest x-ray  Sputum cultures growing MRSA   Was on 4 days of ceftriaxone without improvement in procalcitonin, with most recent level being 1 04  Switched to linezolid today due to inability to use vancomycin with worsening kidney function  Regarding his abdominal lower extremity edema, he does have known ascites with cirrhosis  Per IR unable to draw fluid from his ascites due to not being enough fluid  He also has known CHF and his lower extremity edema is likely due to exacerbation  He is approximately up 40 lb from his dry weight  We have been trying to optimize his lower extremity edema initially with Lasix drip which did not show any improvement  Therefore he was started on Bumex drip  Poor urine output after this and was found to have obstructive uropathy  IR placed a transurethral Gann on 02/11 with good urine output after this  However despite starting a Bumex drip and putting in the Gann, he has actually gained weight  Spoke with Nephrology today that given his worsening creatinine at 3 2 and weight gain he may benefit from CVVH  The 1st wanted to try in attempts to increase Bumex and out on metolazone today  Unfortunately only had 75 mL urine output after this change over amount of 4 hours  Nephrology determined he does need CVVH and he will be transferred downtown today for that  Has been having soft blood pressures since initiating on Bumex  Added on midodrine 5 mg t i d  On 02/11  Has persistently low blood pressures today which is resulted in holding his metoprolol times to in he now has heart rates 110-120  Nephrology increase midodrine to 10 mg t i d  Today with improvement in blood pressures  Lastly patient had low hemoglobin around 6 on admission  With recent trauma in December as well as recent fall prior to this admission  He was noted to have a left abdominal hematoma in December and it was shown to grow on this admission    He does take Coumadin chronically and was subtherapeutic on admission but was also provided vitamin K on 02/10 due to persistently low hemoglobin levels  He is status post 3 packed red blood cells transfusions  He has remained stable for the last 2 days with a hemoglobin of around 7 5  Surgery did evaluate him during this admission and have determined that he does not need surgical intervention at this time  Please see above list of diagnoses and related plan for additional information  Condition at Discharge: stable    Discharge Day Visit / Exam:   Subjective:  Patient reports he is tired and doesn't want to get transferred today and wishes to do it tomorrow but he understood better once I explained to him why he needeed transfer and was agreeable  Vitals: Blood Pressure: 108/66 (02/12/22 1630)  Pulse: (!) 121 (02/12/22 1630)  Temperature: (!) 96 6 °F (35 9 °C) (02/12/22 0700)  Temp Source: Tympanic (02/12/22 0700)  Respirations: (!) 26 (02/12/22 1630)  Height: 6' (182 9 cm) (02/08/22 1249)  Weight - Scale: (!) 170 kg (374 lb 12 5 oz) (02/12/22 0600)  SpO2: 94 % (02/12/22 1630)  Exam:   Physical Exam  Vitals and nursing note reviewed  Constitutional:       General: He is not in acute distress  Appearance: He is obese  He is ill-appearing  HENT:      Head: Normocephalic and atraumatic  Cardiovascular:      Rate and Rhythm: Tachycardia present  Rhythm irregular  Pulses: Normal pulses  Heart sounds: Normal heart sounds  No murmur heard  No gallop  Pulmonary:      Effort: Pulmonary effort is normal       Breath sounds: Normal breath sounds  No wheezing, rhonchi or rales  Abdominal:      General: Bowel sounds are normal  There is distension  Tenderness: There is no abdominal tenderness  There is no guarding or rebound  Musculoskeletal:         General: Normal range of motion  Right lower leg: Edema present  Left lower leg: Edema present  Comments: Left arm edema   Skin:     General: Skin is warm and dry  Neurological:      General: No focal deficit present        Mental Status: He is alert and oriented to person, place, and time  Psychiatric:         Mood and Affect: Mood normal          Behavior: Behavior normal           Discussion with Family: Updated  (wife) via phone  Discharge Statement:  I spent 60 minutes discharging the patient  This time was spent on the day of discharge  I had direct contact with the patient on the day of discharge  Greater than 50% of the total time was spent examining patient, answering all patient questions, arranging and discussing plan of care with patient as well as directly providing post-discharge instructions  Additional time then spent on discharge activities      ** Please Note: This note may have been constructed using a voice recognition system **

## 2022-02-12 NOTE — ASSESSMENT & PLAN NOTE
CT chest showing concern for RLL pneumonia  procalcitonin x 2 positive at 0 5 however could be elevated due to CKD --> procal continues to rise  Urinary antigens negative, discontinue azithromycin  Continue IV ceftriaxone - day #5, DC due to sputum culture growing MRSA  WBC remains normal, afebrile  Check sputum culture - growing staphylococcus and MRSA  Discussed with ID can place on linezolid given poor kidney function at this time preventing us from using vancomycin     Added respiratory protocol, incentive spirometry

## 2022-02-12 NOTE — ASSESSMENT & PLAN NOTE
Wt Readings from Last 3 Encounters:   02/12/22 (!) 170 kg (374 lb 12 5 oz)   12/29/21 (!) 152 kg (336 lb)   12/14/21 (!) 152 kg (335 lb 1 6 oz)     With diffuse anasarca  Prior discharge weight is closer to 340lbs, approximately 30-40 lbs up from dry weight  Chronic diuretic regimen is lasix 120mg BID with PRN metalozone  Echo 2/21 EF 60%, PA pressure 52 mm Hg, no significant valvulopathy  Received 40mg IV lasix on admission then transitioned to lasix GTT 10mg/hr on 2/8  Cardiology input appreciated  Low salt diet with 1800cc fluid restriction  Continue intake and output, daily weights  With continued poor output, 5lb weight gain and worsening kidney function on 2/12, discussed CVVH with nephrology   Will attempt increase in midodrine and increase in Bumex with addition of metolazone today and if still poor output and worsening kidney function can consider transfer for CVVH 2/13

## 2022-02-12 NOTE — ASSESSMENT & PLAN NOTE
Lab Results   Component Value Date    EGFR 19 02/12/2022    EGFR 19 02/11/2022    EGFR 27 02/10/2022    CREATININE 3 26 (H) 02/12/2022    CREATININE 3 21 (H) 02/11/2022    CREATININE 2 43 (H) 02/10/2022     Renal function has fluctuated over the past 3-4 months but it appears his baseline creatinine is around 2  Now with worsening creatinine in the setting of IV diuresis, Post obstructive uropathy  Now with urinary catheter placed by IR  Monitor renal function closely with diuresis  Intake and output  Nephrology consult and ongoing support appreciated   CVVH planned with alvin Grissom

## 2022-02-12 NOTE — ASSESSMENT & PLAN NOTE
CT chest showing concern for RLL pneumonia  procalcitonin x 2 positive at 0 5 however could be elevated due to CKD --> procal continues to rise  Urinary antigens negative, discontinue azithromycin  Continue IV ceftriaxone - day #5, DC due to sputum culture growing MRSA  WBC remains normal, afebrile  Check sputum culture - growing staphylococcus and MRSA  Discussed with ID can place on linezolid given poor kidney function at this time preventing us from using vancomycin     MRSA colonization protocol with CHG baths, mupirocin nares BID and chg mouthwash daily  Added respiratory protocol, incentive spirometry

## 2022-02-12 NOTE — ASSESSMENT & PLAN NOTE
Acute urinary retention noted, no output in 24 hours per nursing  D/w urology  IR available to place urinary catheter - difficulty with placement due to 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  IR successfully placed trans urethral urinary catheter  Likely contributing to MARISABEL    Consider inpatient vs  Outpatient voiding trial   Still having poor urine output despite this, likely needs further diuresis vs CVVH

## 2022-02-12 NOTE — PROGRESS NOTES
Progress Note - General Surgery   Miguelito Gomez 57 y o  male MRN: [id number]  Unit/Bed#:  Encounter: [id number]    Assessment:  79-year-old gentleman with numerous comorbidities admitted status post recurrent fall and also noted to have pneumonia  Noted to have a large rectus sheath hematoma slightly enlarged compared to previous CT scan  Patient still with significant anasarca and significant fluid retention, on Bumex drip  Gann catheter placed by IR  However renal function continues to worsen  Hemoglobin appears stable  Plan:  - hemoglobin appears stable, continue daily labs  - continue antibiotics as per primary medical team for pneumonia  - remainder of comorbidities as per primary medical team / nephrology / cardiology    From general surgery perspective no intervention required at this time  Hematoma is stable as is hemoglobin  Continue daily labs  Remainder of comorbidities as per primary medical team   Surgery will sign off  Please re-consult if necessary    Subjective/Objective   Chief Complaint:  No new complaints    Subjective:   Still tired  bdomen he feels softer without significant pain  Objective:     Blood pressure (!) 87/53, pulse (!) 117, temperature (!) 96 6 °F (35 9 °C), temperature source Tympanic, resp  rate 17, height 6' (1 829 m), weight (!) 170 kg (374 lb 12 5 oz), SpO2 92 %  ,Body mass index is 50 83 kg/m²        Intake/Output Summary (Last 24 hours) at 2/12/2022 1353  Last data filed at 2/12/2022 1204  Gross per 24 hour   Intake 766 67 ml   Output 725 ml   Net 41 67 ml       Invasive Devices  Report    Peripheral Intravenous Line            Peripheral IV 02/08/22 Right Antecubital 4 days    Peripheral IV 02/10/22 Right;Ventral (anterior) Forearm 2 days          Drain            Urethral Catheter Other (Comment) 10 2 Fr  <1 day                Physical Exam:   General:  Acute distress but does appear uncomfortable  HEENT:  Normocephalic, atraumatic trachea midline  CV: Tachycardic, S1-S2 audible, irregular regular rhythm consistent with AFib  Pulmonary:  Decreased breath sounds bilaterally, no significant wheezes  GI:  Abdomen is super morbidly obese, abdomen is firm likely secondary to hematoma in overall severe edema secondary to anasarca, no significant tenderness to palpation  MSK:  Lower extremities without clubbing or cyanosis  Neuro:  Alert orient x3, cranial 2-12 grossly intact  Psych:  Mood and Affect appropriate              Lab, Imaging and other studies:  I have personally reviewed pertinent lab results    , CBC:   Lab Results   Component Value Date    WBC 6 02 02/12/2022    HGB 7 4 (L) 02/12/2022    HCT 24 4 (L) 02/12/2022    MCV 91 02/12/2022     02/12/2022    MCH 27 6 02/12/2022    MCHC 30 3 (L) 02/12/2022    RDW 17 2 (H) 02/12/2022    MPV 10 5 02/12/2022    NRBC 0 02/12/2022   , CMP:   Lab Results   Component Value Date    SODIUM 128 (L) 02/12/2022    K 3 8 02/12/2022    CL 89 (L) 02/12/2022    CO2 33 (H) 02/12/2022    BUN 89 (H) 02/12/2022    CREATININE 3 26 (H) 02/12/2022    CALCIUM 8 4 02/12/2022    EGFR 19 02/12/2022   , Coagulation:   No results found for: PT, INR, APTT, Urinalysis: No results found for: COLORU, CLARITYU, SPECGRAV, PHUR, LEUKOCYTESUR, NITRITE, PROTEINUA, GLUCOSEU, KETONESU, BILIRUBINUR, BLOODU, Amylase: No results found for: AMYLASE, Lipase: No results found for: LIPASE  VTE Pharmacologic Prophylaxis: Reason for no pharmacologic prophylaxis Anemia  VTE Mechanical Prophylaxis: sequential compression device

## 2022-02-12 NOTE — CASE MANAGEMENT
Case Management Discharge Planning Note    Patient name Rima Connolly  Location / MRN [id number]  : 1960 Date 2022       Current Admission Date: 2022  Current Admission Diagnosis:Acute on chronic diastolic (congestive) heart failure [facility])   Patient Active Problem List    Diagnosis Date Noted    Urinary retention 2022    Abnormal CT of the chest 2022    RLL pneumonia 2022    Acute blood loss anemia 2021    Abdominal wall hematoma 2021    Long term (current) use of anticoagulants 10/14/2021    Hyperphosphatemia 2021    Elevated alkaline phosphatase level 2021    Neutropenia (HCC) 2021    Pancytopenia ([facility] ) 2021    NSVT (nonsustained ventricular tachycardia) ([facility] ) 2021    Hypoalbuminemia 2021    Chronic right-sided congestive heart failure ([facility] ) 2021    Severe sepsis with septic shock (HCC) 2021    Panniculitis 2021    Iron deficiency 2021    Right wrist pain 2021    Morbid (severe) obesity due to excess calories ([facility])     Smoking     Hypotension 2021    Acute kidney injury superimposed on chronic kidney disease ([facility] ) 03/15/2021    Hypertensive nephrosclerosis 03/15/2021    Acute renal failure superimposed on stage 3b chronic kidney disease ([facility] ) 03/15/2021    Hypercalcemia 03/10/2021    Anemia 2021    Constipation 2021    Pulmonary hypertension ([facility] ) 2021    Vitamin D deficiency 2021    Atelectasis 2021    Hyponatremia 2021    Lactic acidosis 2021    Permanent atrial fibrillation ([facility] ) 2021    Diverticulosis of colon 2021    Lesion of spleen 2021    Chronic diastolic CHF (congestive heart failure) ([facility] ) 2021    Tobacco use 2021    Other cirrhosis of liver ([facility] ) 2020    Encounter for screening for other viral diseases  2020    Hypokalemia 2020    Thrombocytopenia [facility] 08/24/2020    Pleural effusion, right 08/21/2020    SOB (shortness of breath) 08/21/2020    Cirrhosis of liver with ascites ([facility] ) 08/21/2020    Tobacco abuse 08/21/2020    Acute on chronic anemia 08/21/2020    STEFF (obstructive sleep apnea)     Hypertensive heart and chronic kidney disease with heart failure and stage 1 through stage 4 chronic kidney disease, or chronic kidney disease ([facility] ) 10/16/2019    Chronic venous insufficiency 05/01/2019    Varicose veins of both lower extremities with inflammation 05/01/2019    Lymphedema 10/24/2018    Acute on chronic diastolic (congestive) heart failure ([facility] ) 10/17/2018      LOS (days): 4  Geometric Mean LOS (GMLOS) (days): 3 80  Days to GMLOS:-0 3     OBJECTIVE:  Risk of Unplanned Readmission Score: 41         Current admission status: Inpatient   Preferred Pharmacy:   [id number]- [place] [id number]  Phone: [phone] Fax: [phone]    Primary Care Provider: Hermila King DO    Primary Insurance: Sami Bailon [facility] REP  Secondary Insurance:     DISCHARGE DETAILS:  Pt is being transferred to a higher level of care  Pt is being transferred to [facility] because he needs a CVVH   Pt was transported by [employer]  Unable to get auth for transport because [facility] is closed  I will attempt to get auth on Monday

## 2022-02-12 NOTE — ASSESSMENT & PLAN NOTE
Rate initially controlled on toprol XL - now increased likely multifactorial in the setting of anemia, recent ambulation, pulmonary edema with wheezing, volume overload  Anticoagulated with coumadin at home - Holding coumadin for now due to anemia and abdominal wall hematoma   INR trending down 1 5 --> 1 23  Given concern over ongoing acute bleeding - received 1 time dose of IV vitamin K 2/10/22  Hgb now remains stable      Unfortunately, due to soft BP unable to receive his last 2 doses of metoprolol and HR are increased around 110 currently, midodrine increased to attempt to optimize BP

## 2022-02-12 NOTE — ASSESSMENT & PLAN NOTE
Acute on chronic in the setting of chronic disease, Previous baseline hemoglobin is 7-8  Anemia is also in in the setting of rectus sheath hematoma, prior history of AVMs per GI  Also anticoagulated on Coumadin which presents an issue with acute bleeding  Now also hypotensive, tachycardic  S/p total 3 units PRBCs, last transfusion 2/11, Hgb appropriately improved from 6 8 --> 7 7  Remaining stable at this range currently     Continue to monitor H/H closely  Continue to Transfuse for hgb < 7  Follow hemoccult testing, consider further GI workup if needed  D/w GI possible inpatient EGD / colonoscopy Tuesday if anemia continues, hemoccult positive  Transfered to ICU for closer hemodynamic monitoring in the setting of possible acute bleed  General surgery aware

## 2022-02-12 NOTE — NURSING NOTE
Pt wheeled off unit via stretcher by transport team  Report given to receiving RN  Verbal understanding of same  Pt in no acute distress  Most of pt's belongings remain on unit  Will continue to attempt to get a hold of wife to come retrieve them  Pt aware

## 2022-02-13 NOTE — ASSESSMENT & PLAN NOTE
· Noted on cat scan  · Patient without fever or leukocytosis or respiratory symtpoms  · Check procalcitonin  · MRSA positive sputum  · Continue Zyvox D#2

## 2022-02-13 NOTE — H&P
[address] 1960, 57 y o  male MRN: [id number]  Unit/Bed#: ICU 02 Encounter: [id number]  Primary Care Provider: Roxana Matta DO   Date and time admitted to hospital: 2/12/2022  6:51 PM    * Acute renal failure superimposed on stage 3b chronic kidney disease [facility])  Assessment & Plan  Lab Results   Component Value Date    EGFR 19 02/12/2022    EGFR 19 02/11/2022    EGFR 27 02/10/2022    CREATININE 3 26 (H) 02/12/2022    CREATININE 3 21 (H) 02/11/2022    CREATININE 2 43 (H) 02/10/2022       · Patient admitted to [place] [address] due to mechanical fall and volume overload  · Patient on lasix 80 BID, zaroxolyn and midodrine prior to admission  · Nephrology was consulted and the patient was trialed on a lasix infusion which was transitioned to a bumex infusion  · The patient was transferred to [address] [place] [address] for possible CVVH versus dialysis due to low urine output  · Remains hemodynamically stable  · Continue Bumex infusion and zaroxolyn daily  · Nephrology consulted and following  · Fluid restriction  · Monitor urine output    Acute on chronic diastolic (congestive) heart failure ([facility] [address] )  Assessment & Plan  Wt Readings from Last 3 Encounters:   02/12/22 (!) 170 kg (374 lb 12 5 oz)   12/29/21 (!) 152 kg (336 lb)   12/14/21 (!) 152 kg (335 lb 1 6 oz)         · Patient noted to be volume overload with a increase in his weight of 20 kg since December and anasarca  · Patient on room air with no respiratory distress  · Continue Bumex infusion and zaroxolyn - patient may require dialysis in the near future    RLL pneumonia  Assessment & Plan  · Noted on cat scan  · Patient without fever or leukocytosis or respiratory symtpoms  · Check procalcitonin  · MRSA positive sputum  · Continue Zyvox D#1    Acute on chronic anemia  Assessment & Plan  · Due to abdominal wall hematoma, chronic anticoagulation  · s/p 3 units PRBC, last transfusion on 2/11  · Surgery following up at [facility] and has signed off - no intervention at this time    Abdominal wall hematoma  Assessment & Plan  · First diagnosed in december and underwent IR procedure   · Slightly increased in size  · Received 3 unit PRBC  · Surgery consulted and signed off - no intervention at this time  · If patient continus to bleed may require repeat IR intervention    Hyponatremia  Assessment & Plan  · Secondary to volume overload      Hypotension  Assessment & Plan  · Appears chronic in nature - patient maintained on midodrine at home  · Midodrine increased 10 mg TID    Lymphedema  Assessment & Plan  · Chronic due to venous insufficiency and volume overload    STEFF (obstructive sleep apnea)  Assessment & Plan  · BIPAP as needed    Other cirrhosis of liver (HCC)  Assessment & Plan  · Possibly related to prior alcohol abuse  · Mild ascites noted on cat scan  · Outpatient follow up with GI    Permanent atrial fibrillation ([facility] [address] )  Assessment & Plan  · Patient maintained on coumadin and lopressor at home  · Coumadin held due to abdominal wall hematoma  · Lopressor held due to marginal blood pressures while on midodrine  · Consider alternative atrial fibrillation medications - digoxin? Urinary retention  Assessment & Plan  · Patient underwent leahy catheter placement by intervention radiology  · Maintain leahy      -------------------------------------------------------------------------------------------------------------  Chief Complaint: I need my rest    History of Present Illness   HX and PE limited by:   Miguelito Gomez is a 57 y o  male with chronic kidney disease, CHF, atrial fibrillation presented to [place] [address] after a mechanical fall and volume overload  The patient was noted to have anisarca and ongoing weight gain despite adjustment in his diuretic regime   He was admitted to the hospital and placed on a lasix infusion which was transitioned to a bumex infusion when the patients' urine output remained minimal  The patients urine output continued to be suboptimal and therefore the patient was transferred to [facility] for possible CVVH versus dialysis  History obtained from chart review  -------------------------------------------------------------------------------------------------------------  Dispo: Admit to Stepdown Level 1    Code Status: Level 1 - Full Code  --------------------------------------------------------------------------------------------------------------  Review of Systems   Respiratory: Negative for shortness of breath  Gastrointestinal: Positive for abdominal pain  Neurological: Positive for weakness  All other systems reviewed and are negative  A 12-point, complete review of systems was reviewed and negative except as stated above     Physical Exam  Vitals reviewed  Constitutional:       Appearance: He is obese  HENT:      Head: Normocephalic and atraumatic  Nose: Nose normal       Mouth/Throat:      Mouth: Mucous membranes are moist    Eyes:      Extraocular Movements: Extraocular movements intact  Pupils: Pupils are equal, round, and reactive to light  Cardiovascular:      Rate and Rhythm: Tachycardia present  Rhythm irregular  Pulses: Normal pulses  Heart sounds: Normal heart sounds  Pulmonary:      Effort: Pulmonary effort is normal       Breath sounds: Normal breath sounds  Abdominal:      General: Bowel sounds are normal  There is distension  Tenderness: There is abdominal tenderness  Musculoskeletal:         General: Normal range of motion  Cervical back: Normal range of motion and neck supple  Right lower leg: Edema present  Left lower leg: Edema present  Skin:     Comments: anasarca noted  Elephantiasis noted in lower extrimities   Neurological:      General: No focal deficit present        Mental Status: He is alert        --------------------------------------------------------------------------------------------------------------  Vitals: Vitals:    02/12/22 1859 02/12/22 1929 02/1960 02/12/22 2000   BP: 103/69   (!) 85/64   Pulse:   (!) 112 (!) 110   Resp:   14 19   Temp:  97 7 °F (36 5 °C)     TempSrc:  Temporal     SpO2:   94% 94%     Temp  Min: 96 6 °F (35 9 °C)  Max: 98 6 °F (37 °C)        There is no height or weight on file to calculate BMI  Laboratory and Diagnostics:  Results from last 7 days   Lab Units 02/12/22  1449 02/12/22  0430 02/11/22  1502 02/11/22  0641 02/10/22  1414 02/10/22  0911 02/10/22  0053 02/09/22  0913 02/09/22  0520 02/08/22  1732 02/08/22  1256   WBC Thousand/uL  --  6 02  --  6 53  --  6 39  --   --  5 50  --  5 58   HEMOGLOBIN g/dL 7 4* 7 4* 7 6* 7 2* 7 5* 7 7* 6 8*   < > 6 6*   < > 7 1*   HEMATOCRIT % 23 7* 24 4* 24 4* 23 9* 24 3* 25 5* 22 2*   < > 21 4*   < > 23 1*   PLATELETS Thousands/uL  --  178  --  170  --  192  --   --  178  --  184   NEUTROS PCT %  --  76*  --  75  --  76*  --   --  76*  --  75   MONOS PCT %  --  10  --  9  --  9  --   --  10  --  11    < > = values in this interval not displayed       Results from last 7 days   Lab Units 02/12/22  0430 02/11/22  0641 02/10/22  0636 02/09/22  0520 02/08/22  1256   SODIUM mmol/L 128* 127* 130* 129* 128*   POTASSIUM mmol/L 3 8 3 9 4 3 3 2* 2 5*   CHLORIDE mmol/L 89* 87* 89* 90* 89*   CO2 mmol/L 33* 34* 32 35* 37*   ANION GAP mmol/L 6 6 9 4 2*   BUN mg/dL 89* 90* 84* 80* 82*   CREATININE mg/dL 3 26* 3 21* 2 43* 1 87* 1 91*   CALCIUM mg/dL 8 4 8 2* 8 1* 8 0* 8 1*   GLUCOSE RANDOM mg/dL 122 153* 138 141* 128   ALT U/L  --   --   --  11*  --    AST U/L  --   --   --  22  --    ALK PHOS U/L  --   --   --  143*  --    ALBUMIN g/dL  --   --   --  1 9*  --    TOTAL BILIRUBIN mg/dL  --   --   --  0 82  --      Results from last 7 days   Lab Units 02/09/22  0520 02/08/22  1256   MAGNESIUM mg/dL 2 2 2 1      Results from last 7 days   Lab Units 02/11/22  0846 02/10/22  0911 02/08/22  1256   INR  1 23* 1 53* 1 63*   PTT seconds  --   --  52*              ABG: VBG:    Results from last 7 days   Lab Units 02/09/22  0520 02/08/22  1732   PROCALCITONIN ng/ml 0 55* 0 54*       Micro:  Results from last 7 days   Lab Units 02/10/22  1747 02/08/22  1910 02/08/22  1732   SPUTUM CULTURE   --  4+ Growth of Staphylococcus aureus*  4+ Growth of Methicillin Resistant Staphylococcus aureus*  2+ Growth of   --    GRAM STAIN RESULT   --  1+ Epithelial cells per low power field*  1+ Polys*  3+ Gram positive cocci in pairs*  Rare Gram negative rods*  --    MRSA CULTURE ONLY  Methicillin Resistant Staphylococcus aureus isolated*  This patient requires contact isolation precautions per [place] law  Contact precautions are not required in [place] for nasal surveillance cultures  --   --    LEGIONELLA URINARY ANTIGEN   --   --  Negative   STREP PNEUMONIAE ANTIGEN, URINE   --   --  Negative       EKG: atrial fibrillation  Imaging: I have personally reviewed pertinent reports  Historical Information   Past Medical History:   Diagnosis Date    A-fib [facility])     Acute renal failure superimposed on stage 3b chronic kidney disease ([place] [address] ) 03/15/2021    Cardiac disease     Chronic combined systolic (congestive) and diastolic (congestive) heart failure (HCC)     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 ascites (HCC)     Class 3 severe obesity due to excess calories with serious comorbidity and body mass index (BMI) of 45 0 to 49 9 in adult [facility]) 10/17/2018    Diabetes mellitus ([place] [address] )     Dilated cardiomyopathy ([place] [address] )     History of echocardiogram 11/24/2014    EF 0 30 (30%), Likely mod LV systolic dysfunction  Likely RV dysfunction as well   History of Lexiscan MPI 02/19/2016    EF 0 43 (43%), no prior MI or ischemia   Hx of echocardiogram 04/28/2017    Normal EF, Normal LV systolic function  Mild concentric LV hypertrophy  Mild mitral and tricuspid regurg      Hx: recurrent pneumonia     Hypertension     Hypokalemia 09/04/2020    Long term (current) use of anticoagulants     Morbid (severe) obesity due to excess calories (HCC)     Neuropathy     Pleural effusion on right     Stage 3 chronic kidney disease [facility])      Past Surgical History:   Procedure Laterality Date    HERNIA REPAIR      IR CHEST TUBE CHECK/CHANGE/REPOSITION/UPSIZE  5/27/2021    IR CHEST TUBE PLACEMENT  5/22/2021    IR OTHER  2/11/2022    IR PELVIC ANGIOGRAM  12/4/2021    IR THORACENTESIS  8/25/2020    IR THORACENTESIS  2/23/2021    LUNG DECORTICATION Right 6/2/2021    Procedure: DECORTICATION LUNG;  Surgeon: Mary Erwin MD;  [facility]: [facility];  Service: Thoracic    SPLENECTOMY, TOTAL      THORACOSCOPY VIDEO ASSISTED SURGERY (VATS) Right 6/2/2021    Procedure: THORACOSCOPY VIDEO ASSISTED SURGERY (VATS); Surgeon: Mary Erwin MD;  [facility]: [facility];  Service: Thoracic    VASCULAR SURGERY Right     leg     Social History   Social History     Substance and Sexual Activity   Alcohol Use Yes    Comment: 4 or 5 drinks weekly/bloody chani's     Social History     Substance and Sexual Activity   Drug Use Never     Social History     Tobacco Use   Smoking Status Current Some Day Smoker    Packs/day: 0 25    Types: Cigarettes   Smokeless Tobacco Never Used     Exercise History:   Family History:   No family history on file    Family history unknown and I have reviewed this patient's family history and commented on sigificant items within the HPI      Medications:  Current Facility-Administered Medications   Medication Dose Route Frequency    acetaminophen (TYLENOL) tablet 650 mg  650 mg Oral Q6H PRN    albuterol inhalation solution 2 5 mg  2 5 mg Nebulization Q6H PRN    bumetanide (BUMEX) 12 5 mg infusion 50 mL  1 5 mg/hr Intravenous Continuous    calcium carbonate (TUMS) chewable tablet 500 mg  500 mg Oral TID PRN    chlorhexidine (PERIDEX) 0 12 % oral rinse 15 mL  15 mL Swish & Spit Q12H [address]    influenza vaccine, recombinant, quadrivalent (FLUBLOK) IM injection 0 5 mL  0 5 mL Intramuscular Prior to discharge    linezolid (ZYVOX) IVPB (premix in dextrose) 600 mg 300 mL  600 mg Intravenous Q12H    [START ON 2/13/2022] metolazone (ZAROXOLYN) tablet 10 mg  10 mg Oral Daily    metoprolol succinate (TOPROL-XL) 24 hr tablet 25 mg  25 mg Oral Q12H [facility]    [START ON 2/13/2022] midodrine (PROAMATINE) tablet 10 mg  10 mg Oral TID AC    mupirocin (BACTROBAN) 2 % nasal ointment   Nasal Q12H [facility]    oxyCODONE (ROXICODONE) immediate release tablet 10 mg  10 mg Oral Q4H PRN    oxyCODONE (ROXICODONE) IR tablet 5 mg  5 mg Oral Q4H PRN    [START ON 2/13/2022] pantoprazole (PROTONIX) EC tablet 40 mg  40 mg Oral Daily Before Breakfast    senna-docusate sodium (SENOKOT S) 8 6-50 mg per tablet 2 tablet  2 tablet Oral BID     Home medications:  Prior to Admission Medications   Prescriptions Last Dose Informant Patient Reported? Taking? Lancets (freestyle) lancets  Self No No   Sig: Test glucose once a day     acetaminophen (TYLENOL) 325 mg tablet   No No   Sig: Take 3 tablets (975 mg total) by mouth every 8 (eight) hours   Patient not taking: Reported on 2/8/2022    aspirin 81 mg chewable tablet   Yes No   Sig: Chew 162 mg daily   calcium carbonate (TUMS) 500 mg chewable tablet   No No   Sig: Chew 1 tablet (500 mg total) 2 (two) times a day as needed for indigestion or heartburn   citalopram (CeleXA) 10 mg tablet   Yes No   Patient not taking: Reported on 12/29/2021    furosemide (LASIX) 80 mg tablet   No No   Sig: TAKE 1 TABLET (80 MG TOTAL) BY MOUTH 2 (TWO) TIMES A DAY   glucose blood test strip   No No   Sig: Test glucose twice a day   metolazone (ZAROXOLYN) 5 mg tablet   No No   Sig: TAKE 1 TABLET TWICE WEEKLY OR DAILY IF NEEDED FOR WEIGHT GAIN >3LBS IN 24HOURS   metoprolol succinate (TOPROL-XL) 25 mg 24 hr tablet   No No   Sig: Take 1 tablet (25 mg total) by mouth 2 (two) times a day   midodrine (PROAMATINE) 5 mg tablet   No No   Sig: Take 3 tablets (15 mg total) by mouth every 8 (eight) hours Patient not taking: Reported on 12/29/2021    nicotine (NICODERM CQ) 7 mg/24hr TD 24 hr patch   No No   Sig: Place 1 patch on the skin daily   Patient not taking: Reported on 12/29/2021    polyethylene glycol (MIRALAX) 17 g packet  Self No No   Sig: Take 17 g by mouth daily as needed (constipation)   Patient not taking: Reported on 2/8/2022    senna-docusate sodium (SENOKOT S) 8 6-50 mg per tablet   No No   Sig: Take 2 tablets by mouth 2 (two) times a day   warfarin (COUMADIN) 5 mg tablet   No No   Sig: Take 10 mg on 9/24 then 5 mg daily there after  You will need a repeat PT/INR checked on Monday, 9/27  Facility-Administered Medications: None     Allergies:  No Known Allergies    ------------------------------------------------------------------------------------------------------------  Advance Directive and Living Will:      Power of :    POLST:    ------------------------------------------------------------------------------------------------------------  Anticipated Length of Stay is > 2 midnights    Care Time Delivered:   Upon my evaluation, this patient had a high probability of imminent or life-threatening deterioration due to acute on chronic renal failure, which required my direct attention, intervention, and personal management  I have personally provided 30 minutes (1945 to 2020) of critical care time, exclusive of procedures, teaching, family meetings, and any prior time recorded by providers other than myself  Lenell Cap, CRNP        Portions of the record may have been created with voice recognition software  Occasional wrong word or "sound a like" substitutions may have occurred due to the inherent limitations of voice recognition software    Read the chart carefully and recognize, using context, where substitutions have occurred

## 2022-02-13 NOTE — ASSESSMENT & PLAN NOTE
Lab Results   Component Value Date    EGFR 19 02/12/2022    EGFR 19 02/11/2022    EGFR 27 02/10/2022    CREATININE 3 26 (H) 02/12/2022    CREATININE 3 21 (H) 02/11/2022    CREATININE 2 43 (H) 02/10/2022       · Patient admitted to [place] [address] due to mechanical fall and volume overload  · Patient on lasix 80 BID, zaroxolyn and midodrine prior to admission  · Nephrology was consulted and the patient was trialed on a lasix infusion which was transitioned to a bumex infusion  · The patient was transferred to [facility] for possible CVVH versus dialysis due to low urine output  · Remains hemodynamically stable  · Continue Bumex infusion and zaroxolyn daily  · Nephrology consulted and following  · Fluid restriction  · Monitor urine output

## 2022-02-13 NOTE — PROGRESS NOTES
[address]  Progress Note - Emma Del Angel 1960, 57 y o  male MRN: [id number]  Unit/Bed#: ICU 02 Encounter: [id number]  Primary Care Provider: Mary Fallon DO   Date and time admitted to hospital: 2/12/2022  6:51 PM    * Acute renal failure superimposed on stage 3b chronic kidney disease [facility])  Assessment & Plan  Lab Results   Component Value Date    EGFR 19 02/12/2022    EGFR 19 02/11/2022    EGFR 27 02/10/2022    CREATININE 3 26 (H) 02/12/2022    CREATININE 3 21 (H) 02/11/2022    CREATININE 2 43 (H) 02/10/2022       · Patient admitted to [place] [address] due to mechanical fall and volume overload  · Patient on lasix 80 BID, zaroxolyn and midodrine prior to admission  · Nephrology was consulted and the patient was trialed on a lasix infusion which was transitioned to a bumex infusion  · The patient was transferred to [address] [place] [address] for possible CVVH versus dialysis due to low urine output  · Remains hemodynamically stable  · Continue Bumex infusion and zaroxolyn daily  · Nephrology consulted and following  · Fluid restriction  · Monitor urine output    Acute on chronic diastolic (congestive) heart failure ([facility] [address] )  Assessment & Plan  Wt Readings from Last 3 Encounters:   02/12/22 (!) 170 kg (374 lb 12 5 oz)   12/29/21 (!) 152 kg (336 lb)   12/14/21 (!) 152 kg (335 lb 1 6 oz)         · Patient noted to be volume overload with a increase in his weight of 20 kg since December and anasarca  · Patient on room air with no respiratory distress  · Continue Bumex infusion and zaroxolyn - patient may require dialysis in the near future    RLL pneumonia  Assessment & Plan  · Noted on cat scan  · Patient without fever or leukocytosis or respiratory symtpoms  · Check procalcitonin  · MRSA positive sputum  · Continue Zyvox D#2    Acute on chronic anemia  Assessment & Plan  · Due to abdominal wall hematoma, chronic anticoagulation  · s/p 3 units PRBC, last transfusion on 2/11  · Surgery following up at [facility] and has signed off - no intervention at this time    Abdominal wall hematoma  Assessment & Plan  · First diagnosed in december and underwent IR procedure   · Slightly increased in size  · Received 3 unit PRBC  · Surgery consulted and signed off - no intervention at this time  · If patient continus to bleed may require repeat IR intervention    Hyponatremia  Assessment & Plan  · Secondary to volume overload      Hypotension  Assessment & Plan  · Appears chronic in nature - patient maintained on midodrine at home  · Midodrine increased 10 mg TID    Lymphedema  Assessment & Plan  · Chronic due to venous insufficiency and volume overload    STEFF (obstructive sleep apnea)  Assessment & Plan  · BIPAP as needed    Other cirrhosis of liver (HCC)  Assessment & Plan  · Possibly related to prior alcohol abuse  · Mild ascites noted on cat scan  · Outpatient follow up with GI    Permanent atrial fibrillation ([facility] [address] )  Assessment & Plan  · Patient maintained on coumadin and lopressor at home  · Coumadin held due to abdominal wall hematoma  · Lopressor held due to marginal blood pressures while on midodrine  · Consider alternative atrial fibrillation medications - digoxin? Urinary retention  Assessment & Plan  · Patient underwent leahy catheter placement by intervention radiology  · Maintain leahy        ----------------------------------------------------------------------------------------  HPI/24hr events: patient urine output remains marginal      Patient appropriate for transfer out of the ICU today?: No  Disposition: Continue Critical Care   Code Status: Level 1 - Full Code  ---------------------------------------------------------------------------------------  SUBJECTIVE  Patient complains of nausea and vomiting    Review of Systems   Respiratory: Positive for cough  Negative for shortness of breath  Gastrointestinal: Positive for nausea and vomiting       Review of systems was reviewed and negative unless stated above in HPI/24-hour events   ---------------------------------------------------------------------------------------  OBJECTIVE    Vitals   Vitals:    22 0100 22 0200 22 0300 22 0400   BP: (!) 93/44 92/54 95/55 (!) 95/46   Pulse: (!) 116 (!) 110 (!) 106 104   Resp: 15 12 (!) 9 (!) 11   Temp:       TempSrc:       SpO2: 98% 99% 100% 99%     Temp (24hrs), Av 2 °F (36 2 °C), Min:96 6 °F (35 9 °C), Max:97 7 °F (36 5 °C)  Current: Temperature: 97 7 °F (36 5 °C)          Respiratory:  SpO2: SpO2: 99 %  Nasal Cannula O2 Flow Rate (L/min): 2 L/min    Invasive/non-invasive ventilation settings   Respiratory  Report   Lab Data (Last 4 hours)    None         O2/Vent Data (Last 4 hours)    None                Physical Exam  Vitals reviewed  HENT:      Head: Normocephalic and atraumatic  Nose: Nose normal       Mouth/Throat:      Mouth: Mucous membranes are moist    Eyes:      Extraocular Movements: Extraocular movements intact  Pupils: Pupils are equal, round, and reactive to light  Cardiovascular:      Rate and Rhythm: Tachycardia present  Rhythm irregular  Pulses: Normal pulses  Heart sounds: Normal heart sounds  Pulmonary:      Effort: Pulmonary effort is normal       Breath sounds: Normal breath sounds  Abdominal:      General: Abdomen is flat  Bowel sounds are normal  There is no distension  Palpations: Abdomen is soft  Tenderness: There is no abdominal tenderness  Musculoskeletal:         General: Swelling present  Normal range of motion  Cervical back: Normal range of motion and neck supple  Right lower leg: Edema present  Left lower leg: Edema present  Skin:     General: Skin is warm  Neurological:      General: No focal deficit present  Mental Status: He is alert and oriented to person, place, and time               Laboratory and Diagnostics:  Results from last 7 days   Lab Units 22  1449 22  0430 22  1502 02/11/22  0641 02/10/22  1414 02/10/22  0911 02/10/22  0053 02/09/22  0913 02/09/22  0520 02/08/22  1732 02/08/22  1256   WBC Thousand/uL  --  6 02  --  6 53  --  6 39  --   --  5 50  --  5 58   HEMOGLOBIN g/dL 7 4* 7 4* 7 6* 7 2* 7 5* 7 7* 6 8*   < > 6 6*   < > 7 1*   HEMATOCRIT % 23 7* 24 4* 24 4* 23 9* 24 3* 25 5* 22 2*   < > 21 4*   < > 23 1*   PLATELETS Thousands/uL  --  178  --  170  --  192  --   --  178  --  184   NEUTROS PCT %  --  76*  --  75  --  76*  --   --  76*  --  75   MONOS PCT %  --  10  --  9  --  9  --   --  10  --  11    < > = values in this interval not displayed  Results from last 7 days   Lab Units 02/12/22  0430 02/11/22  0641 02/10/22  0636 02/09/22  0520 02/08/22  1256   SODIUM mmol/L 128* 127* 130* 129* 128*   POTASSIUM mmol/L 3 8 3 9 4 3 3 2* 2 5*   CHLORIDE mmol/L 89* 87* 89* 90* 89*   CO2 mmol/L 33* 34* 32 35* 37*   ANION GAP mmol/L 6 6 9 4 2*   BUN mg/dL 89* 90* 84* 80* 82*   CREATININE mg/dL 3 26* 3 21* 2 43* 1 87* 1 91*   CALCIUM mg/dL 8 4 8 2* 8 1* 8 0* 8 1*   GLUCOSE RANDOM mg/dL 122 153* 138 141* 128   ALT U/L  --   --   --  11*  --    AST U/L  --   --   --  22  --    ALK PHOS U/L  --   --   --  143*  --    ALBUMIN g/dL  --   --   --  1 9*  --    TOTAL BILIRUBIN mg/dL  --   --   --  0 82  --      Results from last 7 days   Lab Units 02/09/22  0520 02/08/22  1256   MAGNESIUM mg/dL 2 2 2 1      Results from last 7 days   Lab Units 02/11/22  0846 02/10/22  0911 02/08/22  1256   INR  1 23* 1 53* 1 63*   PTT seconds  --   --  52*              ABG:    VBG:    Results from last 7 days   Lab Units 02/09/22  0520 02/08/22  1732   PROCALCITONIN ng/ml 0 55* 0 54*       Micro  Results from last 7 days   Lab Units 02/12/22  2027 02/10/22  1747 02/08/22 1910 02/08/22  1732   BLOOD CULTURE  Received in Microbiology Lab  Culture in Progress  Received in Microbiology Lab  Culture in Progress    --   --   --    SPUTUM CULTURE   --   --  4+ Growth of Staphylococcus aureus*  4+ Growth of Methicillin Resistant Staphylococcus aureus*  2+ Growth of   --    GRAM STAIN RESULT   --   --  1+ Epithelial cells per low power field*  1+ Polys*  3+ Gram positive cocci in pairs*  Rare Gram negative rods*  --    MRSA CULTURE ONLY   --  Methicillin Resistant Staphylococcus aureus isolated*  This patient requires contact isolation precautions per [place] law  Contact precautions are not required in [place] for nasal surveillance cultures  --   --    LEGIONELLA URINARY ANTIGEN   --   --   --  Negative   STREP PNEUMONIAE ANTIGEN, URINE   --   --   --  Negative       EKG: atrial fibrillation  Imaging: I have personally reviewed pertinent reports  Intake and Output  I/O       02/11 0701 02/12 0700 02/12 0701 02/13 0700    P  O   120    IV Piggyback  300    Total Intake  420    Urine  325    Total Output  325    Net  +95                Height and Weights         There is no height or weight on file to calculate BMI  Weight (last 2 days)     None            Nutrition       Diet Orders   (From admission, onward)             Start     Ordered    02/12/22 1906  Diet Cardiovascular; Cardiac; Sodium 2 GM, Fluid Restriction 1500 ML  Diet effective now        References:    Nutrtion Support Algorithm Enteral vs  Parenteral   Question Answer Comment   Diet Type Cardiovascular    Cardiac Cardiac    Other Restriction(s): Sodium 2 GM    Other Restriction(s): Fluid Restriction 1500 ML    RD to adjust diet per protocol?  Yes        02/12/22 1906                  Active Medications  Scheduled Meds:  Current Facility-Administered Medications   Medication Dose Route Frequency Provider Last Rate    acetaminophen  650 mg Oral Q6H PRN Elspeth Molly, CRNP      albuterol  2 5 mg Nebulization Q6H PRN Elspeth Molly, CRNP      bumetanide  1 5 mg/hr Intravenous Continuous Elspeth Molly, CRNP 1 5 mg/hr (02/13/22 0138)    calcium carbonate  500 mg Oral TID PRN Elspeth Molly, CRNP      chlorhexidine  15 mL Swish & Spit Q12H [address] Cary Cruz, CRNP      influenza vaccine  0 5 mL Intramuscular Prior to discharge [facility], TRES      linezolid  600 mg Intravenous Q12H [facility], CRNP      metolazone  10 mg Oral Daily [facility], ERNANP      metoprolol succinate  25 mg Oral Q12H [address] [facility], CRNP      midodrine  10 mg Oral TID AC [facility], CRNP      mupirocin   Nasal Q12H [address] [facility], CRNP      ondansetron  4 mg Intravenous Q6H PRN [facility], CRNP      oxyCODONE  10 mg Oral Q4H PRN [facility], CRNP      oxyCODONE  5 mg Oral Q4H PRN [facility], CRNP      pantoprazole  40 mg Oral Daily Before Breakfast [facility], ERNANP      propofol           senna-docusate sodium  2 tablet Oral BID [facility], TRES       Continuous Infusions:  bumetanide, 1 5 mg/hr, Last Rate: 1 5 mg/hr (02/13/22 0138)      PRN Meds:   acetaminophen, 650 mg, Q6H PRN  albuterol, 2 5 mg, Q6H PRN  calcium carbonate, 500 mg, TID PRN  influenza vaccine, 0 5 mL, Prior to discharge  ondansetron, 4 mg, Q6H PRN  oxyCODONE, 10 mg, Q4H PRN  oxyCODONE, 5 mg, Q4H PRN        Invasive Devices Review  Invasive Devices  Report    Peripheral Intravenous Line            Peripheral IV 02/08/22 Right Antecubital 4 days    Peripheral IV 02/10/22 Right;Ventral (anterior) Forearm 2 days          Drain            Urethral Catheter Other (Comment) 10 2 Fr  1 day                Rationale for remaining devices:   ---------------------------------------------------------------------------------------  Advance Directive and Living Will:      Power of :    POLST:    ---------------------------------------------------------------------------------------  Care Time Delivered:   Upon my evaluation, this patient had a high probability of imminent or life-threatening deterioration due to renal failure, which required my direct attention, intervention, and personal management    I have personally provided 20 minutes (0001 to 0400) of critical care time, exclusive of procedures, teaching, family meetings, and any prior time recorded by providers other than myself  TRES Sheest      Portions of the record may have been created with voice recognition software  Occasional wrong word or "sound a like" substitutions may have occurred due to the inherent limitations of voice recognition software    Read the chart carefully and recognize, using context, where substitutions have occurred

## 2022-02-13 NOTE — ASSESSMENT & PLAN NOTE
· First diagnosed in december and underwent IR procedure   · Slightly increased in size  · Received 3 unit PRBC  · Surgery consulted and signed off - no intervention at this time  · If patient continus to bleed may require repeat IR intervention

## 2022-02-13 NOTE — ASSESSMENT & PLAN NOTE
Wt Readings from Last 3 Encounters:   02/12/22 (!) 170 kg (374 lb 12 5 oz)   12/29/21 (!) 152 kg (336 lb)   12/14/21 (!) 152 kg (335 lb 1 6 oz)         · Patient noted to be volume overload with a increase in his weight of 20 kg since December and anasarca  · Patient on room air with no respiratory distress  · Continue Bumex infusion and zaroxolyn - patient may require dialysis in the near future

## 2022-02-13 NOTE — ASSESSMENT & PLAN NOTE
· Noted on cat scan  · Patient without fever or leukocytosis or respiratory symtpoms  · Check procalcitonin  · MRSA positive sputum  · Continue Zyvox D#1

## 2022-02-13 NOTE — ASSESSMENT & PLAN NOTE
· Due to abdominal wall hematoma, chronic anticoagulation  · s/p 3 units PRBC, last transfusion on 2/11  · Surgery following up at [facility] and has signed off - no intervention at this time

## 2022-02-13 NOTE — PROGRESS NOTES
Vancomycin Assessment    Sally Lara is a 57 y o  male who is currently receiving a vancomycin 2,000 mg IV loading dose for pneumonia  Relevant clinical data and objective history reviewed:  Creatinine   Date Value Ref Range Status   02/13/2022 3 30 (H) 0 60 - 1 30 mg/dL Final     Comment:     Standardized to IDMS reference method   02/12/2022 3 26 (H) 0 60 - 1 30 mg/dL Final     Comment:     Standardized to IDMS reference method   02/11/2022 3 21 (H) 0 60 - 1 30 mg/dL Final     Comment:     Standardized to IDMS reference method   12/16/2015 1 14 0 60 - 1 30 mg/dL Final     Comment:     Standardized to IDMS reference method   06/07/2015 0 83 0 60 - 1 30 mg/dL Final     Comment:     Standardized to IDMS reference method   06/06/2015 0 73 0 60 - 1 30 mg/dL Final     Comment:     Standardized to IDMS reference method     Vancomycin Rm   Date Value Ref Range Status   09/22/2021 15 4 ug/mL Final     BP (!) 85/51 (BP Location: Left arm)   Pulse (!) 108   Temp 98 °F (36 7 °C) (Temporal)   Resp 17   Ht 5' 11" (1 803 m)   Wt (!) 170 kg (373 lb 10 9 oz)   SpO2 95%   BMI 52 12 kg/m²   I/O last 3 completed shifts: In: 474 6 [P O :120;  I V :54 6; IV Piggyback:300]  Out: 400 [Urine:400]  Lab Results   Component Value Date/Time    BUN 92 (H) 02/13/2022 05:34 AM    BUN 16 12/16/2015 07:25 AM    WBC 6 38 02/13/2022 05:34 AM    WBC 6 05 12/16/2015 07:25 AM    HGB 7 1 (L) 02/13/2022 05:34 AM    HGB 13 5 12/16/2015 07:25 AM    HCT 23 2 (L) 02/13/2022 05:34 AM    HCT 40 5 12/16/2015 07:25 AM    MCV 90 02/13/2022 05:34 AM     (H) 12/16/2015 07:25 AM     02/13/2022 05:34 AM     (L) 12/16/2015 07:25 AM     Temp Readings from Last 3 Encounters:   02/13/22 98 °F (36 7 °C) (Temporal)   02/12/22 (!) 96 6 °F (35 9 °C) (Tympanic)   12/30/21 (!) 96 3 °F (35 7 °C) (Tympanic)     Vancomycin Days of Therapy: 1    Assessment/Plan  The patient is currently on vancomycin utilizing pulse dosing based on adjusted body weight (due to obesity)  Baseline risks associated with therapy include: pre-existing renal impairment  The patient is currently receiving a 2 gram loading dose and we will check a random vanco level early tomorrow am ~ 0600  Pharmacy will follow closely for s/sx of nephrotoxicity, infusion reactions, and appropriateness of therapy  BMP and CBC will be ordered per protocol  Due to infection severity, will target a vanco level 15-20 ng/mL  Pharmacy will continue to follow the patients culture results and clinical progress daily      Ramez Love, Pharmacist

## 2022-02-13 NOTE — ASSESSMENT & PLAN NOTE
· Due to abdominal wall hematoma, chronic anticoagulation  · s/p 3 units PRBC, last transfusion on 2/11  · Surgery following up at [facility] and has signed off - no intervention at this time

## 2022-02-13 NOTE — PLAN OF CARE
Problem: Potential for Falls  Goal: Patient will remain free of falls  Description: INTERVENTIONS:  - Educate patient/family on patient safety including physical limitations  - Instruct patient to call for assistance with activity   - Consult OT/PT to assist with strengthening/mobility   - Keep Call bell within reach  - Keep bed low and locked with side rails adjusted as appropriate  - Keep care items and personal belongings within reach  - Initiate and maintain comfort rounds  - Make Fall Risk Sign visible to staff  Problem: MOBILITY - ADULT  Goal: Maintain or return to baseline ADL function  Description: INTERVENTIONS:  -  Assess patient's ability to carry out ADLs; assess patient's baseline for ADL function and identify physical deficits which impact ability to perform ADLs (bathing, care of mouth/teeth, toileting, grooming, dressing, etc )  - Assess/evaluate cause of self-care deficits   - Assess range of motion  - Assess patient's mobility; develop plan if impaired  - Assess patient's need for assistive devices and provide as appropriate  - Encourage maximum independence but intervene and supervise when necessary  - Involve family in performance of ADLs  - Assess for home care needs following discharge   - Consider OT consult to assist with ADL evaluation and planning for discharge  - Provide patient education as appropriate  2/12/2022 2142 by Mary Pepe RN  Outcome: Progressing  2/12/2022 2140 by Mary Pepe RN  Outcome: Progressing  Goal: Maintains/Returns to pre admission functional level  Description: INTERVENTIONS:  - Perform BMAT or MOVE assessment daily    - Set and communicate daily mobility goal to care team and patient/family/caregiver     - Collaborate with rehabilitation services on mobility goals if consulted  Problem: Prexisting or High Potential for Compromised Skin Integrity  Goal: Skin integrity is maintained or improved  Description: INTERVENTIONS:  - Identify patients at risk for skin breakdown  - Assess and monitor skin integrity  - Assess and monitor nutrition and hydration status  - Monitor labs   - Assess for incontinence   - Turn and reposition patient  - Assist with mobility/ambulation  - Relieve pressure over bony prominences  - Avoid friction and shearing  - Provide appropriate hygiene as needed including keeping skin clean and dry  - Evaluate need for skin moisturizer/barrier cream  - Collaborate with interdisciplinary team   - Patient/family teaching  - Consider wound care consult   2/12/2022 2142 by Lisa Medellin RN  Outcome: Progressing  2/12/2022 2140 by Lisa Medellin RN  Outcome: Progressing     Problem: METABOLIC, FLUID AND ELECTROLYTES - ADULT  Goal: Electrolytes maintained within normal limits  Description: INTERVENTIONS:  - Monitor labs and assess patient for signs and symptoms of electrolyte imbalances  - Administer electrolyte replacement as ordered  - Monitor response to electrolyte replacements, including repeat lab results as appropriate  - Instruct patient on fluid and nutrition as appropriate  Outcome: Progressing  Goal: Fluid balance maintained  Description: INTERVENTIONS:  - Monitor labs   - Monitor I/O and WT  - Instruct patient on fluid and nutrition as appropriate  - Assess for signs & symptoms of volume excess or deficit  Outcome: Progressing  Goal: Glucose maintained within target range  Description: INTERVENTIONS:  - Monitor Blood Glucose as ordered  - Assess for signs and symptoms of hyperglycemia and hypoglycemia  - Administer ordered medications to maintain glucose within target range  - Assess nutritional intake and initiate nutrition service referral as needed  Outcome: Progressing     - Out of bed for toileting  - Record patient progress and toleration of activity level   2/12/2022 2142 by Lisa Medellni RN  Outcome: Progressing  2/12/2022 2140 by Lisa Medellin RN  Outcome: Progressing     - Apply yellow socks and bracelet for high fall risk patients  - Consider moving patient to room near nurses station  2/12/2022 2142 by Salome Pfeiffer RN  Outcome: Progressing  2/12/2022 2140 by Salome Pfeiffer RN  Outcome: Progressing

## 2022-02-13 NOTE — ASSESSMENT & PLAN NOTE
· Possibly related to prior alcohol abuse  · Mild ascites noted on cat scan  · Outpatient follow up with GI

## 2022-02-13 NOTE — PROGRESS NOTES
Progress Note - Nephrology   Haven Cap 57 y o  male MRN: [id number]  Unit/Bed#: ICU 02 Encounter: [id number]    62-year-old male with a past history of chronic kidney disease V, prior AK I episodes diastolic congestive heart failure, dilated cardiomyopathy, hypertension, cirrhosis of the liver, atrial fibrillation, pulmonary hypertension, obesity who presented to [facility] after sustaining a mechanical fall and volume overload  He ultimately transferred to [facility] with worsening creatinine hypotension not responding to Bumex drip for consideration for CVVH  Nephrology consulted for ongoing management of acute kidney injury, volume overload and dialysis needs  ASSESSMENT and PLAN:  Acute kidney injury (POA):  Etiology: Suspect cardiorenal with volume overload and hemodynamic perturbations with  hypotension, urinary retention requiring Gann catheter, possible ERICH with recent contrast exposure on 02/08/2022  Assessment:  · After review of medical records through [address] [place] [address] it appears that the patient  fluctuating creatinine due to prior AK I insults and likely baseline around 1 6 to 1 9 dating back to  September of 2021  · Patient was admitted with a creatinine of  2 43 on 02/10/2022  · Current creatinine 3 30  · Currently on Bumex drip 1 5 milligram/hour  · Currently on midodrine 10 mg t i d    · Blood pressure remains hypotensive in the 80s  · Gann catheter in place  Workup:  · Urinalysis with micro  reports lysed trace blood, 1-2 RBCs occasional bacteria  · Repeat CT scan with contrast on 02/08/2022 revealed mildly increased left abdominal wall  hematoma from December 2021/anasarca-kidneys without hydronephrosis  Plan:  · Avoid nephrotoxins, adjust meds to appropriate GFR  · Optimize hemodynamic status to avoid delay in renal recovery  · Due to poor urinary output and poor response to Bumex, will start CVVHD to assist with volume  Management  · Discussed risks and benefits with the patient regarding CVVHD  All questions asked and answered  Consent form signed and placed on the chart  · Patient may require IV pressors for blood pressure control  · Continue with midodrine 10 mg t i d   For now  · Need accurate I&O, daily weights  · Monitor electrolytes closely     Chronic kidney disease IIIB:  Appears to have progressed   Etiology:  Suspect secondary to cardiomyopathy/cirrhosis and frequent acute kidney injury episodes  Assessment and Plan:  · After review of medical records through [facility] everywhere it appears that the patient has most recent baseline 1 6 -1 9 with fluctuation  · Patient follows with Dr Goyo Ugarte  · If unable to have renal recover-will need case management consult for outpatient placement     Blood pressure/Hypertension with Chronic Kidney Disease:  Actually hypotensive hypotensive  Assessment and Plan:  · BP remains in the 80s despite midodrine  · Consider adding IV pressors to assist with volume removal  · Maximize hemodynamics to maintain MAP >65  · Avoid hypotension or fluctuations in blood pressure  · Will continue to trend     Acute on chronic Chronic diastolic CHF:cirrhosis and dilated cardiomyopathy  Assessment and plan:  · Follows with Dr Chavez as outpatient-cardiologist  · On or diuretics outpatient to include furosemide 80 mg 2 times daily metolazone-likely resistant  · Currently on IV Bumex drip-with marginal urinary output  · Discussed with patient about hemodialysis with CVVHD which he is in agreement with  · Monitor I/O, lab values volume status     H&H/anemia:  in the setting of advanced Chronic Kidney Disease as well as recent  In the setting of prior traumatic fall with abdominal hematoma in December 2021  Assessment and Plan:  · Current hemoglobin 7 1  · Per primary team  · Recent CT scan of abdomen and pelvis on 02/08/2022 reports Left abdominal wall hematoma is  mildly increased from December 2021  · Status post transfusion on 12/12/2021  · Transfuse if hemoglobin less than 7 0     Electrolytes:  Assessment and Plan:  Hyponatremia:  · Likely in setting of volume overload  · Current sodium 128  · Recent workup urine sodium six, urine osmolality 312  · Limit fluids to 1 5 L  · Will start CVVH-likely with volume removal sodium should improve     Other medical Issues:  Urinary retention:  Status post urinary catheter placement be IR on 02/11/2022  · Recommend consulting Urology     History of Trauma with abdominal wall hematoma  · Status post IR pelvic angiogram with on 12/04/2021-status post embolization  · Recent CT scan of abdomen and pelvis on 02/08/2022 reports Left abdominal wall hematoma is  mildly increased from December 2021         SUBJECTIVE:  Patient seen and examined at bedside  Patient reports being tired, uncomfortable  Reports weight gain at home despite diuretics      OBJECTIVE:  Current Weight: Weight - Scale: (!) 170 kg (373 lb 10 9 oz)  Vitals:    02/13/22 1100 02/13/22 1200 02/13/22 1300 02/13/22 1400   BP: (!) 86/47 (!) 85/51 (!) 94/45 (!) 85/54   BP Location: Left arm Left arm  Left arm   Pulse: (!) 108 (!) 108 (!) 110 (!) 106   Resp: (!) 27 17 15 (!) 10   Temp: 98 °F (36 7 °C)      TempSrc: Temporal      SpO2: 93% 95% 92% 90%   Weight:       Height:           Intake/Output Summary (Last 24 hours) at 2/13/2022 1458  Last data filed at 2/13/2022 1338  Gross per 24 hour   Intake 1396 6 ml   Output 670 ml   Net 726 6 ml     General:  No acute distress, cooperative,   Skin:  Warm and dry without rash  ENMT:  Mucous membranes moist, sclera anicteric  Respiratory:  Essentially clear on auscultation without crackles, rhonchi, wheezes  Cardiac:  Regular rate and rhythm without rub, or murmur  GI:  Soft, nontender, no distention, active bowel sounds  Neuro:  Alert oriented and awake  Psych:  Appropriate affect    Medications:    Current Facility-Administered Medications:     acetaminophen (TYLENOL) tablet 650 mg, 650 mg, Oral, Q6H PRN, Saintclair Parson, CRNP    albuterol inhalation solution 2 5 mg, 2 5 mg, Nebulization, Q6H PRN, Saintclair Parson, CRNP    bumetanide (BUMEX) 12 5 mg infusion 50 mL, 1 5 mg/hr, Intravenous, Continuous, TRES [place], Last Rate: 6 mL/hr at 02/13/22 1117, 1 5 mg/hr at 02/13/22 1117    calcium carbonate (TUMS) chewable tablet 500 mg, 500 mg, Oral, TID PRN, Saintclair Parson, CRNP, 500 mg at 02/12/22 2119    chlorhexidine (PERIDEX) 0 12 % oral rinse 15 mL, 15 mL, Swish & Spit, Q12H [address], Saintclair Parson, CRNP, 15 mL at 02/13/22 0854    metolazone (ZAROXOLYN) tablet 10 mg, 10 mg, Oral, Daily, Saintclair Parson, CRNP    metoprolol succinate (TOPROL-XL) 24 hr tablet 25 mg, 25 mg, Oral, Q12H [address], Saintclair Parson, CRNP    midodrine (PROAMATINE) tablet 10 mg, 10 mg, Oral, TID AC, TRES Hill, 10 mg at 02/13/22 1141    mupirocin (BACTROBAN) 2 % nasal ointment, , Nasal, Q12H [address], Saintclair Parson, CRNP, 1 application at 41/24/59 0854    NxStage K 2/Ca 3 dialysis solution (RFP-400) 20,000 mL, 20,000 mL, Dialysis, Continuous, Nuzhat Cantu MD    ondansetron [facility]) injection 4 mg, 4 mg, Intravenous, Q6H PRN, Saintclair Parson, CRNP, 4 mg at 02/13/22 1010    oxyCODONE (ROXICODONE) immediate release tablet 10 mg, 10 mg, Oral, Q4H PRN, Saintclair Parson, CRNP, 10 mg at 02/13/22 4640    oxyCODONE (ROXICODONE) IR tablet 5 mg, 5 mg, Oral, Q4H PRN, Saintclair Parson, ERNANP, 5 mg at 02/12/22 2323    pantoprazole (PROTONIX) EC tablet 40 mg, 40 mg, Oral, Daily Before Breakfast, Saintclair Parson, CRNP, 40 mg at 02/13/22 3638    senna-docusate sodium (SENOKOT S) 8 6-50 mg per tablet 2 tablet, 2 tablet, Oral, BID, Saintclair Parson, CRNP, 2 tablet at 02/13/22 0854    Laboratory Results:  Results from last 7 days   Lab Units 02/13/22  0534 02/12/22  1449 02/12/22  0430 02/11/22  1502 02/11/22  0641 02/10/22  1414 02/10/22  0911 02/10/22  0636 02/10/22  0053 02/09/22  0913 02/09/22  0520 02/08/22  1732 02/08/22  1256   WBC Thousand/uL 6 38 --  6 02  --  6 53  --  6 39  --   --   --  5 50  --  5 58   HEMOGLOBIN g/dL 7 1* 7 4* 7 4* 7 6* 7 2* 7 5* 7 7*  --    < >   < > 6 6*   < > 7 1*   HEMATOCRIT % 23 2* 23 7* 24 4* 24 4* 23 9* 24 3* 25 5*  --    < >   < > 21 4*   < > 23 1*   PLATELETS Thousands/uL 165  --  178  --  170  --  192  --   --   --  178  --  184   SODIUM mmol/L 128*  --  128*  --  127*  --   --  130*  --   --  129*  --  128*   POTASSIUM mmol/L 4 1  --  3 8  --  3 9  --   --  4 3  --   --  3 2*  --  2 5*   CHLORIDE mmol/L 87*  --  89*  --  87*  --   --  89*  --   --  90*  --  89*   CO2 mmol/L 32  --  33*  --  34*  --   --  32  --   --  35*  --  37*   BUN mg/dL 92*  --  89*  --  90*  --   --  84*  --   --  80*  --  82*   CREATININE mg/dL 3 30*  --  3 26*  --  3 21*  --   --  2 43*  --   --  1 87*  --  1 91*   CALCIUM mg/dL 8 5  --  8 4  --  8 2*  --   --  8 1*  --   --  8 0*  --  8 1*   MAGNESIUM mg/dL 2 6  --   --   --   --   --   --   --   --   --  2 2  --  2 1    < > = values in this interval not displayed

## 2022-02-13 NOTE — ASSESSMENT & PLAN NOTE
Lab Results   Component Value Date    EGFR 19 02/12/2022    EGFR 19 02/11/2022    EGFR 27 02/10/2022    CREATININE 3 26 (H) 02/12/2022    CREATININE 3 21 (H) 02/11/2022    CREATININE 2 43 (H) 02/10/2022       · Patient admitted to [place] [address] due to mechanical fall and volume overload  · Patient on lasix 80 BID, zaroxolyn and midodrine prior to admission  · Nephrology was consulted and the patient was trialed on a lasix infusion which was transitioned to a bumex infusion  · The patient was transferred to [facility] for possible CVVH versus dialysis due to low urine output  · Remains hemodynamically stable  · Continue Bumex infusion and zaroxolyn daily  · Nephrology consulted and following  · Fluid restriction  · Monitor urine output

## 2022-02-13 NOTE — ASSESSMENT & PLAN NOTE
· Appears chronic in nature - patient maintained on midodrine at home  · Midodrine increased 10 mg TID

## 2022-02-13 NOTE — ASSESSMENT & PLAN NOTE
· Patient maintained on coumadin and lopressor at home  · Coumadin held due to abdominal wall hematoma  · Lopressor held due to marginal blood pressures while on midodrine  · Consider alternative atrial fibrillation medications - digoxin?

## 2022-02-13 NOTE — PLAN OF CARE
Problem: Potential for Falls  Goal: Patient will remain free of falls  Description: INTERVENTIONS:  - Educate patient/family on patient safety including physical limitations  - Instruct patient to call for assistance with activity   - Consult OT/PT to assist with strengthening/mobility   - Keep Call bell within reach  - Keep bed low and locked with side rails adjusted as appropriate  - Keep care items and personal belongings within reach  - Initiate and maintain comfort rounds  - Make Fall Risk Sign visible to staff    - Apply yellow socks and bracelet for high fall risk patients  - Consider moving patient to room near nurses station  Outcome: Progressing     Problem: MOBILITY - ADULT  Goal: Maintain or return to baseline ADL function  Description: INTERVENTIONS:  -  Assess patient's ability to carry out ADLs; assess patient's baseline for ADL function and identify physical deficits which impact ability to perform ADLs (bathing, care of mouth/teeth, toileting, grooming, dressing, etc )  - Assess/evaluate cause of self-care deficits   - Assess range of motion  - Assess patient's mobility; develop plan if impaired  - Assess patient's need for assistive devices and provide as appropriate  - Encourage maximum independence but intervene and supervise when necessary  - Involve family in performance of ADLs  - Assess for home care needs following discharge   - Consider OT consult to assist with ADL evaluation and planning for discharge  - Provide patient education as appropriate  Outcome: Progressing  Goal: Maintains/Returns to pre admission functional level  Description: INTERVENTIONS:  - Perform BMAT or MOVE assessment daily    - Set and communicate daily mobility goal to care team and patient/family/caregiver  - Collaborate with rehabilitation services on mobility goals if consulted  - Perform Range of Motion 4 times a day  - Reposition patient every 2 hours    - Dangle patient 3 times a day  - Stand patient 3 times a day  - Ambulate patient 2 times a day  - Out of bed to chair 2 times a day   - Out of bed for meals 2 times a day  - Out of bed for toileting  - Record patient progress and toleration of activity level   Outcome: Progressing     Problem: Prexisting or High Potential for Compromised Skin Integrity  Goal: Skin integrity is maintained or improved  Description: INTERVENTIONS:  - Identify patients at risk for skin breakdown  - Assess and monitor skin integrity  - Assess and monitor nutrition and hydration status  - Monitor labs   - Assess for incontinence   - Turn and reposition patient  - Assist with mobility/ambulation  - Relieve pressure over bony prominences  - Avoid friction and shearing  - Provide appropriate hygiene as needed including keeping skin clean and dry  - Evaluate need for skin moisturizer/barrier cream  - Collaborate with interdisciplinary team   - Patient/family teaching  - Consider wound care consult   Outcome: Progressing     Problem: METABOLIC, FLUID AND ELECTROLYTES - ADULT  Goal: Electrolytes maintained within normal limits  Description: INTERVENTIONS:  - Monitor labs and assess patient for signs and symptoms of electrolyte imbalances  - Administer electrolyte replacement as ordered  - Monitor response to electrolyte replacements, including repeat lab results as appropriate  - Instruct patient on fluid and nutrition as appropriate  Outcome: Progressing  Goal: Fluid balance maintained  Description: INTERVENTIONS:  - Monitor labs   - Monitor I/O and WT  - Instruct patient on fluid and nutrition as appropriate  - Assess for signs & symptoms of volume excess or deficit  Outcome: Progressing  Goal: Glucose maintained within target range  Description: INTERVENTIONS:  - Monitor Blood Glucose as ordered  - Assess for signs and symptoms of hyperglycemia and hypoglycemia  - Administer ordered medications to maintain glucose within target range  - Assess nutritional intake and initiate nutrition service referral as needed  Outcome: Progressing

## 2022-02-14 NOTE — ASSESSMENT & PLAN NOTE
· Due to abdominal wall hematoma, chronic anticoagulation  · s/p 3 units PRBC, last transfusion on 2/11  · Surgery following up at [facility] and has signed off - no intervention at this time

## 2022-02-14 NOTE — UTILIZATION REVIEW
Initial Clinical Review    TRANSFER FROM [facility]  ICU    Admission: Date/Time/Statement:   Admission Orders (From admission, onward)     Ordered        02/12/22 1904  Inpatient Admission  Once                      Orders Placed This Encounter   Procedures    Inpatient Admission     Standing Status:   Standing     Number of Occurrences:   1     Order Specific Question:   Level of Care     Answer:   Critical Care [15]     Order Specific Question:   Estimated length of stay     Answer:   More than 2 Midnights     Order Specific Question:   Certification     Answer:   I certify that inpatient services are medically necessary for this patient for a duration of greater than two midnights  See H&P and MD Progress Notes for additional information about the patient's course of treatment  Initial Presentation: 57  Y O male  Initially admitted to [facility]  On  2/8  With CHF  Transferred to [facility]  On  2/12 for possible  CVVH vs dialysis due to low urine output  PMH  Is  CKD, CHF, chronic lymphedema, STEFF, chronic anemia, abdominal wall hematoma,    Hypotension, cirrhosis of liver,  atrial fibrillation  Treated at [facility] with lasix  Drip, transitioned to bumex with  Minimal  Urine output  CT scan  Shows pneumonia  Admit Ip Stepdown LOC  With Acute renal failure on  Chronic kidney disease, acute/chronic diastolic heart failure, acute/chronic anemia and  RLL pneumonia and plan is  Fluid restrict, nephrology consult, monitor labs, continue bumex infusion, zaroxyln daily,  SCOT, S/P 3 UPRBC, last  Transfusion  2/11, and possible dialysis  Date:     2/13       Day 2:   Planning  CVVH due to poor response due  Diuretics and anasarca  Renal function worse  Leahy  Catheter  Inserted in  IR  Minimal output in leahy  Remains  On bumex drip  May need   IV pressors for BP control  Creatinine  Now   3 30  Temporary  Dialysis catheter inserted       2/14   on  CVVH     Wt Readings from Last 1 Encounters:   02/14/22 (!) 168 kg (370 lb 6 oz)     Additional Vital Signs:   02/14/22 0000 -- 108 Abnormal  10 Abnormal  102/65 74 99 % -- -- -- --   02/13/22 2300 -- 112 Abnormal  5 Abnormal  95/65 81 99 % -- -- -- --   02/13/22 2221 98 4 °F (36 9 °C) -- -- -- -- -- -- -- -- --   02/13/22 2000 97 1 °F (36 2 °C) Abnormal  114 Abnormal  13 90/48 Abnormal  65 87 % Abnormal  -- -- -- --   02/13/22 1900 -- 110 Abnormal  15 91/49 Abnormal  64 88 % Abnormal  -- -- -- --   02/13/22 1800 -- 114 Abnormal  11 Abnormal  84/42 Abnormal  59 92 % -- -- -- --   02/13/22 1700 -- 112 Abnormal  17 97/52 69 91 % -- -- -- --   02/13/22 1600 -- 116 Abnormal  18 89/49 Abnormal  63 89 % Abnormal  -- -- None (Room air) --   02/13/22 1500 98 °F (36 7 °C) 108 Abnormal  16 83/43 Abnormal  54 90 % -- -- None (Room air) Lying   02/13/22 1400 -- 106 Abnormal  10 Abnormal  85/54 Abnormal  65 90 % -- -- None (Room air) Lying   02/13/22 1300 -- 110 Abnormal  15 94/45 Abnormal  63 92 % -- -- -- --   02/13/22 1200 -- 108 Abnormal  17 85/51 Abnormal  63 95 % -- -- None (Room air) Lying   02/13/22 1100 98 °F (36 7 °C) 108 Abnormal  27 Abnormal  86/47 Abnormal  66 93 % -- -- None (Room air) Lying   02/13/22 1005 -- 104 29 Abnormal  82/44 Abnormal  59 98 % -- -- -- --   02/13/22 1000 -- 104 28 Abnormal  76/44 Abnormal  52 98 % -- -- -- --   02/13/22 0928 -- -- -- -- -- 98 % 28 2 L/min Nasal cannula --   02/13/22 0900 -- 110 Abnormal  20 94/60 73 100 % -- -- -- --   02/13/22 0802 -- 106 Abnormal  12 84/59 Abnormal  68 98 % -- -- -- --   02/13/22 0800 -- 106 Abnormal  12 79/47 Abnormal  57 98 % -- -- -- --   02/13/22 0730 97 6 °F (36 4 °C) 110 Abnormal  24 Abnormal  -- -- 97 % 28 2 L/min Nasal cannula Lying   02/13/22 0700 -- 110 Abnormal  17 85/49 Abnormal  61 98 % -- -- -- --   02/13/22 0600 -- 108 Abnormal  11 Abnormal  100/50 66 98 % -- -- -- --   02/13/22 0500 -- 104 7 Abnormal  90/61 71 99 % -- -- -- --   02/13/22 0400 98 2 °F (36 8 °C) 104 11 Abnormal  95/46 Abnormal  60 99 % 28 2 L/min Nasal cannula --   02/13/22 0300 -- 106 Abnormal  9 Abnormal  95/55 69 100 % -- -- -- --   02/13/22 0200 -- 110 Abnormal  12 92/54 72 99 % -- -- -- --   02/13/22 0100 -- 116 Abnormal  15 93/44 Abnormal  59 98 % -- -- -- --   02/13/22 0000 -- 112 Abnormal  14 102/66 86 97 % -- -- -- --   02/12/22 2300 -- 112 Abnormal  12 97/51 68 85 % Abnormal  -- -- -- --   02/12/22 2200 -- 114 Abnormal  12 104/54 79 86 % Abnormal  -- -- -- --   02/12/22 2100 -- 114 Abnormal  26 Abnormal  100/61 73 91 % -- -- -- --   02/12/22 2000 -- 110 Abnormal  19 85/64 Abnormal  70 94 % -- --         Pertinent Labs/Diagnostic Test Results:   CXR  ( 2/14)       Right IJ line has been placed and tip overlies the SVC   No pneumothorax        Stable findings for small right pleural effusion with associated infiltrate  Results from last 7 days   Lab Units 02/08/22  1256   SARS-COV-2  Negative     Results from last 7 days   Lab Units 02/14/22  0531 02/13/22  0534 02/12/22  1449 02/12/22  0430 02/11/22  1502 02/11/22  0641 02/11/22  0641   WBC Thousand/uL 7 26 6 38  --  6 02  --    < > 6 53   HEMOGLOBIN g/dL 7 1* 7 1* 7 4* 7 4* 7 6*   < > 7 2*   HEMATOCRIT % 24 0* 23 2* 23 7* 24 4* 24 4*   < > 23 9*   PLATELETS Thousands/uL 146* 165  --  178  --    < > 170   NEUTROS ABS Thousands/µL  --  5 15  --  4 62  --   --  4 97    < > = values in this interval not displayed           Results from last 7 days   Lab Units 02/14/22  0531 02/13/22  2300 02/13/22  2259 02/13/22  0534 02/12/22  0430 02/11/22  0641 02/10/22  0636 02/09/22  0520 02/08/22  1256 02/08/22  1256   SODIUM mmol/L 127* 128*  --  128* 128* 127*   < > 129*   < > 128*   POTASSIUM mmol/L 4 1 3 9  --  4 1 3 8 3 9   < > 3 2*   < > 2 5*   CHLORIDE mmol/L 89* 87*  --  87* 89* 87*   < > 90*   < > 89*   CO2 mmol/L 31 32  --  32 33* 34*   < > 35*   < > 37*   ANION GAP mmol/L 7 9  --  9 6 6   < > 4   < > 2*   BUN mg/dL 78* 91*  --  92* 89* 90*   < > 80*   < > 82*   CREATININE mg/dL 2 88* 3 32*  --  3 30* 3 26* 3 21*   < > 1 87*   < > 1 91*   EGFR ml/min/1 73sq m 22 18  --  18 19 19   < > 37   < > 36   CALCIUM mg/dL 8 7 8 4  --  8 5 8 4 8 2*   < > 8 0*   < > 8 1*   CALCIUM, IONIZED mmol/L 1 06*  --  1 02*  --   --   --   --   --   --   --    MAGNESIUM mg/dL 2 5 2 6  --  2 6  --   --   --  2 2  --  2 1   PHOSPHORUS mg/dL 6 3* 7 6*  --   --   --   --   --   --   --   --     < > = values in this interval not displayed  Results from last 7 days   Lab Units 02/13/22  0534 02/09/22  0520   AST U/L 23 22   ALT U/L 15 11*   ALK PHOS U/L 119* 143*   TOTAL PROTEIN g/dL 7 2 6 7   ALBUMIN g/dL 2 1* 1 9*   TOTAL BILIRUBIN mg/dL 0 93 0 82         Results from last 7 days   Lab Units 02/14/22  0531 02/13/22  2300 02/13/22  0534 02/12/22  0430 02/11/22  0641 02/10/22  0636 02/09/22  0520 02/08/22  1256   GLUCOSE RANDOM mg/dL 122 110 122 122 153* 138 141* 128     Results from last 7 days   Lab Units 02/12/22  1449   OSMOLALITY, SERUM mmol/*             Results from last 7 days   Lab Units 02/14/22  0531 02/11/22  0846 02/10/22  0911 02/08/22  1256 02/08/22  1256   PROTIME seconds 15 2* 14 9* 17 6*   < > 18 5*   INR  1 22* 1 23* 1 53*   < > 1 63*   PTT seconds  --   --   --   --  52*    < > = values in this interval not displayed           Results from last 7 days   Lab Units 02/14/22  0531 02/13/22  2300 02/09/22  0520 02/08/22  1732   PROCALCITONIN ng/ml 0 64* 0 79* 0 55* 0 54*                       Results from last 7 days   Lab Units 02/12/22  1449 02/11/22  1502   OSMOLALITY, SERUM mmol/*  --    OSMO UR mmol/KG  --  312                   Present on Admission:   Acute renal failure superimposed on stage 3b chronic kidney disease ([facility] [address] )   Acute on chronic diastolic (congestive) heart failure (HCC)   Lymphedema   STEFF (obstructive sleep apnea)   Acute on chronic anemia   Other cirrhosis of liver (HCC)   Permanent atrial fibrillation (HCC)   Hyponatremia   Hypotension   Abdominal wall hematoma   Urinary retention   RLL pneumonia      Admitting Diagnosis: CHF (congestive heart failure) ([facility]) [I50 9]  Age/Sex: 57 y o  male  Admission Orders:  Scheduled Medications:  heparin (porcine), 7,500 Units, Subcutaneous, Q8H GERRY  metoprolol succinate, 25 mg, Oral, Q12H GERRY  midodrine, 10 mg, Oral, TID AC  mupirocin, , Nasal, Q12H GERRY  pantoprazole, 40 mg, Oral, Daily Before Breakfast  senna-docusate sodium, 2 tablet, Oral, BID  vancomycin, 15 mg/kg (Adjusted), Intravenous, Q24H      Continuous IV Infusions:  NxStage K 2/Ca 3, 20,000 mL, Dialysis, Continuous      PRN Meds:  acetaminophen, 650 mg, Oral, Q6H PRN  albuterol, 2 5 mg, Nebulization, Q6H PRN  calcium carbonate, 500 mg, Oral, TID PRN  ondansetron, 4 mg, Intravenous, Q6H PRN  oxyCODONE, 10 mg, Oral, Q4H PRN  oxyCODONE, 5 mg, Oral, Q4H PRN        IP CONSULT TO CASE MANAGEMENT  IP CONSULT TO NEPHROLOGY  IP CONSULT TO PHARMACY    Network Utilization Review Department  ATTENTION: Please call with any questions or concerns to [phone] and carefully listen to the prompts so that you are directed to the right person  All voicemails are confidential   Jennifer Doing all requests for admission clinical reviews, approved or denied determinations and any other requests to dedicated fax number below belonging to the campus where the patient is receiving treatment   List of dedicated fax numbers for the Facilities:  [address] DENIALS (Administrative/Medical Necessity) [phone]   [address] (Maternity/NICU/Pediatrics) [phone]   [address] 40 125 [facility]  [address] [facility] [place] [address] [facility]  [address] [place] [address] [address] [place] [id number] [phone]

## 2022-02-14 NOTE — PROGRESS NOTES
Vancomycin IV Pharmacy-to-Dose Consultation    Marissa Crowley is a 57 y o  male who is currently receiving Vancomycin IV with management by the Pharmacy Consult service  Assessment/Plan:  The patient was reviewed  No signs or symptoms of nephrotoxicity and/or infusion reactions were documented in the chart  Was started on CRRT last evening  Potential Nephrotoxicity Factors:  Medications: vasopressors, diuretics (both now off)  Patient Factors: hypotension, renal dysfunction, elderly    Based on todays assessment, continue current vancomycin (day # 2) dosing of 1,750 mg q24h, with a plan for trough to be drawn at 1215 on 2/15  We will continue to follow the patients culture results and clinical progress daily      Carisa Flores, PharmD, [address] [facility] and Internal Medicine Clinical Pharmacist  [phone] or via [employer]

## 2022-02-14 NOTE — OCCUPATIONAL THERAPY NOTE
Occupational Therapy         Patient Name: Marta CONNORD'I Date: 2/14/2022      MD's orders received  RN recommending to hold tx intervention 2* CVVH  Will continue when appropriate   Martina Frye OT

## 2022-02-14 NOTE — ASSESSMENT & PLAN NOTE
Wt Readings from Last 3 Encounters:   02/13/22 (!) 170 kg (373 lb 10 9 oz)   02/12/22 (!) 170 kg (374 lb 12 5 oz)   12/29/21 (!) 152 kg (336 lb)         · Patient noted to be volume overload with a increase in his weight of 20 kg since December and anasarca  · Patient on room air with no respiratory distress  · Little progress on Bumex gtt, started on CVVH  · Goal will be to maintain a negative fluid balance as able

## 2022-02-14 NOTE — ASSESSMENT & PLAN NOTE
Lab Results   Component Value Date    EGFR 18 02/13/2022    EGFR 19 02/12/2022    EGFR 19 02/11/2022    CREATININE 3 30 (H) 02/13/2022    CREATININE 3 26 (H) 02/12/2022    CREATININE 3 21 (H) 02/11/2022       · Patient admitted to [place] [address] due to mechanical fall and volume overload  · Patient on lasix 80 BID, zaroxolyn and midodrine prior to admission  · Nephrology was consulted and the patient was trialed on a lasix infusion which was transitioned to a bumex infusion  · He is currently on CVVH after he failed to make progress with the infusion  · Further management per nephrology recommendations

## 2022-02-14 NOTE — PROCEDURES
NEPHROLOGY DIALYSIS PROCEDURE NOTE      Patient seen and examined on CVVH, tolerating procedure well  All documentation, labs, medications were reviewed by myself, and the treatment plan was reviewed with nurse and patient       Seen on Dialysis at : 11:07 AM  Dialysis Access: Right IJ non tunneled dialysis catheter  Vitals: 90/59, pulse 110, SpO2 90%, respiratory rate 13  CVVH: Therapy Fluid: 3 5 L/hr; Ultrafiltration: net negative 50 mL/hr ; Blood Flow Rate: 250 mL/min; Dialysate: 2K/3Ca      Assessment/Plan:    Acute renal failure/acute tubular necrosis  --presumed to be secondary to cardiorenal syndrome with underlying acute tubular necrosis and hypotension and contrast associated nephropathy with underlying chronic kidney disease  --oliguria, Gann catheter in place  --worsening renal function with concerns of worsening volume overload with poor response to diuretics  --patient started on CRRT due to low blood pressure, volume overload and worsening renal function  --started CVVHD 2/13/22  --will maintain same dialysate, increase ultrafiltration to 75 mL/hour, as BP tolerates    Anemia  - low but stable  - transfuse if hemoglobin less than 7  -history of an abdominal wall hematoma closely monitor    Hyponatremia  - volume mediated  - continue ultrafiltration  - stable    Blood pressure/volume status  - hypotension, with underlying cardiorenal syndrome  - blood pressure stable but low  -currently ultrafiltrate in on CVVH  -midodrine 10 mg 3 times a day  -metoprolol 25 mg twice a day    Hyperphosphatemia  --trending down  --monitor with CVVH    Chronic kidney disease stage IIIB  --appears to have had some underlying progression of disease  --prior baseline creatinine 1 6-1 9 mg/dL  --outpatient nephrologist Dr Tatiana rivas  --Gann catheter in place    History of trauma to the abdominal wall resulting in hematoma  --status post IR pelvic angiogram on 12/4/2021 status post embolization  --recent CT scan reports mild increase in the size hematoma    Acute on chronic diastolic congestive heart failure  --with dilated cardiomyopathy and cirrhosis  --poor response to diuretic  --poor urine output  --worsening renal function  --concurrent hypotension  --now on CVVH    Review of Systems:  Unable to get a good review systems the patient is little bit lethargic and tired    Physical Exam:    General:  Lethargic, chronically ill-appearing  Skin:  No rash no lesions poor turgor  CVS:  S1-S2 appreciated  Lungs:  Coarse breath sounds with rales decreased breath sounds at the bases  Abdomen:  Distended, nontender no guarding  Access:  Right IJ temporary dialysis catheter  Extremities:  Positive edema  Neuro:  Lethargic          Current Facility-Administered Medications:     acetaminophen (TYLENOL) tablet 650 mg, 650 mg, Oral, Q6H PRN, Jacquiline Mew, CRNP    albuterol inhalation solution 2 5 mg, 2 5 mg, Nebulization, Q6H PRN, Jacquiline Mew, CRNP    calcium carbonate (TUMS) chewable tablet 500 mg, 500 mg, Oral, TID PRN, Jacquiline Mew, CRNP, 500 mg at 02/12/22 2119    heparin (porcine) subcutaneous injection 7,500 Units, 7,500 Units, Subcutaneous, Q8H [address], Naima Pruitt PA-C    metoprolol succinate (TOPROL-XL) 24 hr tablet 25 mg, 25 mg, Oral, Q12H GERRY, Naima Pruitt PA-C    midodrine (PROAMATINE) tablet 10 mg, 10 mg, Oral, TID AC, Cary Jones, TRES, 10 mg at 02/14/22 7757    mupirocin (BACTROBAN) 2 % nasal ointment, , Nasal, Q12H [address], Jacquiline Mew, CRNP, 1 application at 55/33/04 0901    NxStage K 2/Ca 3 dialysis solution (RFP-400) 20,000 mL, 20,000 mL, Dialysis, Continuous, Klever Delgado MD, 20,000 mL at 02/14/22 0431    ondansetron (ZOFRAN) injection 4 mg, 4 mg, Intravenous, Q6H PRN, Jacquiline Mew, CRNP, 4 mg at 02/13/22 1613    oxyCODONE (ROXICODONE) immediate release tablet 10 mg, 10 mg, Oral, Q4H PRN, Bobqumary Castrow, ERNANP, 10 mg at 02/14/22 0853    oxyCODONE (ROXICODONE) IR tablet 5 mg, 5 mg, Oral, Q4H PRN, TRES Vance, 5 mg at 02/12/22 2323    pantoprazole (PROTONIX) EC tablet 40 mg, 40 mg, Oral, Daily Before Breakfast, TRES [place], 40 mg at 02/14/22 3987    senna-docusate sodium (SENOKOT S) 8 6-50 mg per tablet 2 tablet, 2 tablet, Oral, BID, TRES Vance, 2 tablet at 02/14/22 0854    vancomycin (VANCOCIN) 1,750 mg in sodium chloride 0 9 % 500 mL IVPB, 15 mg/kg (Adjusted), Intravenous, Q24H, Karen Bazzi MD

## 2022-02-14 NOTE — ASSESSMENT & PLAN NOTE
· Noted on cat scan  · Patient without fever or leukocytosis or respiratory symtpoms  · Procalcitonin is pending  · MRSA positive sputum  · Continue Zyvox D#3

## 2022-02-14 NOTE — UTILIZATION REVIEW
Notification of Discharge   This is a Notification of Discharge from our facility [address]  Please be advised that this patient has been discharge from our facility  Below you will find the admission and discharge date and time including the patients disposition  UTILIZATION REVIEW CONTACT:  Cain Saldana  Utilization   Network Utilization Review Department  Phone: [phone] x carefully listen to the prompts  All voicemails are confidential   Email: [email or web address]     PHYSICIAN ADVISORY SERVICES:  FOR [id number] REVIEW - MEDICAL NECESSITY DENIAL  Phone: [phone]  Fax: [phone]  Email: [email or web address]     PRESENTATION DATE: 2/8/2022 12:45 PM  OBERVATION ADMISSION DATE:   INPATIENT ADMISSION DATE: 2/8/22  3:02 PM   DISCHARGE DATE: 2/12/2022  5:58 PM  DISPOSITION: [address] [facility]      IMPORTANT INFORMATION:  Send all requests for admission clinical reviews, approved or denied determinations and any other requests to dedicated fax number below belonging to the campus where the patient is receiving treatment   List of dedicated fax numbers:  [address] DENIALS (Administrative/Medical Necessity) [phone]   [address] (Maternity/NICU/Pediatrics) [phone]   Yuniel Challenger [phone]   [address] [place] [address] Road [phone]   [address] [place] [address] [phone]   2000 [facility] [address] [place] [address],[address] [place] [address] [phone]   [facility]  [phone]   [address] [facility] [address]  2401 [facility] 1000 [address] [facility] [phone]

## 2022-02-14 NOTE — PLAN OF CARE
Problem: Potential for Falls  Goal: Patient will remain free of falls  Description: INTERVENTIONS:  - Educate patient/family on patient safety including physical limitations  - Instruct patient to call for assistance with activity   - Consult OT/PT to assist with strengthening/mobility   - Keep Call bell within reach  - Keep bed low and locked with side rails adjusted as appropriate  - Keep care items and personal belongings within reach  - Initiate and maintain comfort rounds  - Make Fall Risk Sign visible to staff  - Apply yellow socks and bracelet for high fall risk patients  - Consider moving patient to room near nurses station  Outcome: Progressing     Problem: MOBILITY - ADULT  Goal: Maintain or return to baseline ADL function  Description: INTERVENTIONS:  -  Assess patient's ability to carry out ADLs; assess patient's baseline for ADL function and identify physical deficits which impact ability to perform ADLs (bathing, care of mouth/teeth, toileting, grooming, dressing, etc )  - Assess/evaluate cause of self-care deficits   - Assess range of motion  - Assess patient's mobility; develop plan if impaired  - Assess patient's need for assistive devices and provide as appropriate  - Encourage maximum independence but intervene and supervise when necessary  - Involve family in performance of ADLs  - Assess for home care needs following discharge   - Consider OT consult to assist with ADL evaluation and planning for discharge  - Provide patient education as appropriate  Outcome: Progressing  Goal: Maintains/Returns to pre admission functional level  Description: INTERVENTIONS:  - Perform BMAT or MOVE assessment daily    - Set and communicate daily mobility goal to care team and patient/family/caregiver     - Collaborate with rehabilitation services on mobility goals if consulted  - Perform Range of Motion and activity as tolerated  - Out of bed for toileting  - Record patient progress and toleration of activity level   Outcome: Progressing     Problem: Prexisting or High Potential for Compromised Skin Integrity  Goal: Skin integrity is maintained or improved  Description: INTERVENTIONS:  - Identify patients at risk for skin breakdown  - Assess and monitor skin integrity  - Assess and monitor nutrition and hydration status  - Monitor labs   - Assess for incontinence   - Turn and reposition patient  - Assist with mobility/ambulation  - Relieve pressure over bony prominences  - Avoid friction and shearing  - Provide appropriate hygiene as needed including keeping skin clean and dry  - Evaluate need for skin moisturizer/barrier cream  - Collaborate with interdisciplinary team   - Patient/family teaching  - Consider wound care consult   Outcome: Progressing     Problem: METABOLIC, FLUID AND ELECTROLYTES - ADULT  Goal: Electrolytes maintained within normal limits  Description: INTERVENTIONS:  - Monitor labs and assess patient for signs and symptoms of electrolyte imbalances  - Administer electrolyte replacement as ordered  - Monitor response to electrolyte replacements, including repeat lab results as appropriate  - Instruct patient on fluid and nutrition as appropriate  Outcome: Progressing  Goal: Fluid balance maintained  Description: INTERVENTIONS:  - Monitor labs   - Monitor I/O and WT  - Instruct patient on fluid and nutrition as appropriate  - Assess for signs & symptoms of volume excess or deficit  Outcome: Progressing  Goal: Glucose maintained within target range  Description: INTERVENTIONS:  - Monitor Blood Glucose as ordered  - Assess for signs and symptoms of hyperglycemia and hypoglycemia  - Administer ordered medications to maintain glucose within target range  - Assess nutritional intake and initiate nutrition service referral as needed  Outcome: Progressing     Problem: Nutrition/Hydration-ADULT  Goal: Nutrient/Hydration intake appropriate for improving, restoring or maintaining nutritional needs  Description: Monitor and assess patient's nutrition/hydration status for malnutrition  Collaborate with interdisciplinary team and initiate plan and interventions as ordered  Monitor patient's weight and dietary intake as ordered or per policy  Utilize nutrition screening tool and intervene as necessary  Determine patient's food preferences and provide high-protein, high-caloric foods as appropriate       INTERVENTIONS:  - Monitor oral intake, urinary output, labs, and treatment plans  - Assess nutrition and hydration status and recommend course of action  - Evaluate amount of meals eaten  - Assist patient with eating if necessary   - Allow adequate time for meals  - Recommend/ encourage appropriate diets, oral nutritional supplements, and vitamin/mineral supplements  - Order, calculate, and assess calorie counts as needed  - Recommend, monitor, and adjust tube feedings and TPN/PPN based on assessed needs  - Assess need for intravenous fluids  - Provide specific nutrition/hydration education as appropriate  - Include patient/family/caregiver in decisions related to nutrition  Outcome: Progressing

## 2022-02-14 NOTE — ASSESSMENT & PLAN NOTE
· Patient maintained on coumadin and lopressor at home  · Coumadin held due to abdominal wall hematoma  · Lopressor held due to marginal blood pressures while on midodrine  · Consider alternative atrial fibrillation medications - digoxin?   · Currently rate is fairly controlled

## 2022-02-14 NOTE — PROCEDURES
Temporary HD Catheter    Date/Time: 2/13/2022 9:44 PM  Performed by: TRES Varma  Authorized by: TRES Varma     Consent:     Consent obtained:  Written    Consent given by:  Patient    Risks discussed:  Bleeding, infection and pneumothorax  Universal protocol:     Immediately prior to procedure, a time out was called: yes      Patient identity confirmed:  Arm band  Pre-procedure details:     Skin preparation:  2% chlorhexidine  Indications:     Central line indications: dialysis    Anesthesia (see MAR for exact dosages): Anesthesia method:  Local infiltration    Local anesthetic:  Lidocaine 1% w/o epi  Procedure details:     Location:  Right internal jugular    Vessel type: vein      Laterality:  Right    Approach: percutaneous technique used      Patient position:  Flat    Catheter type:  Triple lumen    Catheter length:  16 cm    Landmarks identified: yes      Ultrasound guidance: yes      Ultrasound image availability:  Images available in PACS    Sterile ultrasound techniques: Sterile gel and sterile probe covers were used      Number of attempts:  1    Successful placement: yes    Post-procedure details:     Post-procedure:  Dressing applied and line sutured    Assessment:  Blood return through all ports and placement verified by x-ray    Patient tolerance of procedure:   Tolerated well, no immediate complications

## 2022-02-14 NOTE — ASSESSMENT & PLAN NOTE
· Appears chronic in nature - patient maintained on midodrine at home  · Midodrine increased 10 mg TID  · Placed on low does levophed during CVVH  · Our goal will be to maintain his MAP > 65

## 2022-02-14 NOTE — MALNUTRITION/BMI
This medical record reflects one or more clinical indicators suggestive of malnutrition and/or morbid obesity  Malnutrition Findings:              BMI Findings:  Adult BMI Classifications: Morbid Obesity 50-59 9     Body mass index is 51 66 kg/m²  See Nutrition note dated 2/14/22 for additional details  Completed nutrition assessment is viewable I/n the nutrition documentation

## 2022-02-14 NOTE — PHYSICAL THERAPY NOTE
Physical Therapy Cancellation Note           02/14/22 1030   PT Last Visit   PT Visit Date 02/14/22   Note Type   Note type Evaluation   Cancel Reasons Medical status   Additional Comments not appropriate per RN (pt on CVVH)  will cx and continue to follow as medically appropriate           Edmund Colon, PT

## 2022-02-14 NOTE — PROGRESS NOTES
[facility]  Progress Note - Baltazar Flores 1960, 57 y o  male MRN: [id number]  Unit/Bed#: ICU 02 Encounter: [id number]  Primary Care Provider: Nessa Morales DO   Date and time admitted to hospital: 2/12/2022  6:51 PM    Acute on chronic diastolic (congestive) heart failure [facility])  Assessment & Plan  Wt Readings from Last 3 Encounters:   02/13/22 (!) 170 kg (373 lb 10 9 oz)   02/12/22 (!) 170 kg (374 lb 12 5 oz)   12/29/21 (!) 152 kg (336 lb)         · Patient noted to be volume overload with a increase in his weight of 20 kg since December and anasarca  · Patient on room air with no respiratory distress  · Little progress on Bumex gtt, started on CVVH  · Goal will be to maintain a negative fluid balance as able    * Acute renal failure superimposed on stage 3b chronic kidney disease [facility])  Assessment & Plan  Lab Results   Component Value Date    EGFR 18 02/13/2022    EGFR 19 02/12/2022    EGFR 19 02/11/2022    CREATININE 3 30 (H) 02/13/2022    CREATININE 3 26 (H) 02/12/2022    CREATININE 3 21 (H) 02/11/2022       · Patient admitted to [place] [address] due to mechanical fall and volume overload  · Patient on lasix 80 BID, zaroxolyn and midodrine prior to admission  · Nephrology was consulted and the patient was trialed on a lasix infusion which was transitioned to a bumex infusion  · He is currently on CVVH after he failed to make progress with the infusion  · Further management per nephrology recommendations    RLL pneumonia  Assessment & Plan  · Noted on cat scan  · Patient without fever or leukocytosis or respiratory symtpoms  · Procalcitonin is pending  · MRSA positive sputum  · Continue Zyvox D#3    Hypotension  Assessment & Plan  · Appears chronic in nature - patient maintained on midodrine at home  · Midodrine increased 10 mg TID  · Placed on low does levophed during CVVH  · Our goal will be to maintain his MAP > 65    Abdominal wall hematoma  Assessment & Plan  · First diagnosed in december and underwent IR procedure   · Slightly increased in size  · Received 3 unit PRBC  · Surgery consulted and signed off - no intervention at this time  · If patient continus to bleed may require repeat IR intervention    Permanent atrial fibrillation ([address] )  Assessment & Plan  · Patient maintained on coumadin and lopressor at home  · Coumadin held due to abdominal wall hematoma  · Lopressor held due to marginal blood pressures while on midodrine  · Consider alternative atrial fibrillation medications - digoxin?   · Currently rate is fairly controlled    Urinary retention  Assessment & Plan  · Patient underwent leahy catheter placement by intervention radiology  · Maintain leahy    Hyponatremia  Assessment & Plan  · Secondary to volume overload      Other cirrhosis of liver (HCC)  Assessment & Plan  · Possibly related to prior alcohol abuse  · Mild ascites noted on cat scan  · Outpatient follow up with GI    Acute on chronic anemia  Assessment & Plan  · Due to abdominal wall hematoma, chronic anticoagulation  · s/p 3 units PRBC, last transfusion on 2/11  · Surgery following up at [facility] and has signed off - no intervention at this time    STEFF (obstructive sleep apnea)  Assessment & Plan  · BIPAP as needed    Lymphedema  Assessment & Plan  · Chronic due to venous insufficiency and volume overload    ----------------------------------------------------------------------------------------  HPI/24hr events: Started on CVVH overnight    Patient appropriate for transfer out of the ICU today?: No  Disposition: Continue Critical Care   Code Status: Level 1 - Full Code  ---------------------------------------------------------------------------------------  SUBJECTIVE  Generalized pain, denies SOB    Review of Systems    ---------------------------------------------------------------------------------------  OBJECTIVE    Vitals   Vitals:    02/14/22 0200 02/14/22 0300 02/14/22 0400 02/14/22 0500   BP: 124/72 (!) 87/71 (!) 100/47 98/53   Pulse: (!) 112 (!) 114 (!) 112 (!) 112   Resp: (!) 10 18 12 12   Temp:   98 6 °F (37 °C)    TempSrc:   Temporal    SpO2: 91% (!) 86% (!) 89% 91%   Weight:       Height:         Temp (24hrs), Av °F (36 7 °C), Min:97 1 °F (36 2 °C), Max:98 6 °F (37 °C)  Current: Temperature: 98 6 °F (37 °C)          Respiratory:  SpO2: SpO2: 91 %, SpO2 Activity: SpO2 Activity: At Rest, SpO2 Device: O2 Device: None (Room air)  Nasal Cannula O2 Flow Rate (L/min): 2 L/min    Invasive/non-invasive ventilation settings   Respiratory  Report   Lab Data (Last 4 hours)    None         O2/Vent Data (Last 4 hours)    None                Physical Exam  Constitutional:       Appearance: He is ill-appearing  HENT:      Head: Normocephalic  Mouth/Throat:      Mouth: Mucous membranes are moist    Eyes:      Pupils: Pupils are equal, round, and reactive to light  Cardiovascular:      Rate and Rhythm: Normal rate  Pulmonary:      Breath sounds: Rales present  Abdominal:      General: There is distension  Tenderness: There is no abdominal tenderness  Musculoskeletal:         General: Swelling present  Cervical back: Neck supple  Lymphadenopathy:      Cervical: No cervical adenopathy  Skin:     General: Skin is warm and dry  Coloration: Skin is pale  Findings: Bruising present  Neurological:      General: No focal deficit present  Mental Status: He is oriented to person, place, and time               Laboratory and Diagnostics:  Results from last 7 days   Lab Units 22  0534 22  1449 22  0430 22  1502 22  0641 02/10/22  1414 02/10/22  0911 22  0913 22  0520 22  1732 22  1256   WBC Thousand/uL 6 38  --  6 02  --  6 53  --  6 39  --  5 50  --  5 58   HEMOGLOBIN g/dL 7 1* 7 4* 7 4* 7 6* 7 2* 7 5* 7 7*   < > 6 6*   < > 7 1*   HEMATOCRIT % 23 2* 23 7* 24 4* 24 4* 23 9* 24 3* 25 5*   < > 21 4*   < > 23 1*   PLATELETS Thousands/uL 165  -- 178  --  170  --  192  --  178  --  184   NEUTROS PCT % 79*  --  76*  --  75  --  76*  --  76*  --  75   MONOS PCT % 8  --  10  --  9  --  9  --  10  --  11    < > = values in this interval not displayed  Results from last 7 days   Lab Units 02/13/22 2300 02/13/22  0534 02/12/22  0430 02/11/22  0641 02/10/22  0636 02/09/22  0520 02/08/22  1256   SODIUM mmol/L 128* 128* 128* 127* 130* 129* 128*   POTASSIUM mmol/L 3 9 4 1 3 8 3 9 4 3 3 2* 2 5*   CHLORIDE mmol/L 87* 87* 89* 87* 89* 90* 89*   CO2 mmol/L 32 32 33* 34* 32 35* 37*   ANION GAP mmol/L 9 9 6 6 9 4 2*   BUN mg/dL 91* 92* 89* 90* 84* 80* 82*   CREATININE mg/dL 3 32* 3 30* 3 26* 3 21* 2 43* 1 87* 1 91*   CALCIUM mg/dL 8 4 8 5 8 4 8 2* 8 1* 8 0* 8 1*   GLUCOSE RANDOM mg/dL 110 122 122 153* 138 141* 128   ALT U/L  --  15  --   --   --  11*  --    AST U/L  --  23  --   --   --  22  --    ALK PHOS U/L  --  119*  --   --   --  143*  --    ALBUMIN g/dL  --  2 1*  --   --   --  1 9*  --    TOTAL BILIRUBIN mg/dL  --  0 93  --   --   --  0 82  --      Results from last 7 days   Lab Units 02/13/22 2300 02/13/22  0534 02/09/22  0520 02/08/22  1256   MAGNESIUM mg/dL 2 6 2 6 2 2 2 1   PHOSPHORUS mg/dL 7 6*  --   --   --       Results from last 7 days   Lab Units 02/11/22  0846 02/10/22  0911 02/08/22  1256   INR  1 23* 1 53* 1 63*   PTT seconds  --   --  52*              ABG:    VBG:    Results from last 7 days   Lab Units 02/13/22 2300 02/09/22  0520 02/08/22  1732   PROCALCITONIN ng/ml 0 79* 0 55* 0 54*       Micro  Results from last 7 days   Lab Units 02/12/22  2027 02/10/22  1747 02/08/22 1910 02/08/22  1732   BLOOD CULTURE  No Growth at 24 hrs    No Growth at 24 hrs   --   --   --    SPUTUM CULTURE   --   --  4+ Growth of Staphylococcus aureus*  4+ Growth of Methicillin Resistant Staphylococcus aureus*  2+ Growth of   --    GRAM STAIN RESULT   --   --  1+ Epithelial cells per low power field*  1+ Polys*  3+ Gram positive cocci in pairs*  Rare Gram negative rods*  --    MRSA CULTURE ONLY   --  Methicillin Resistant Staphylococcus aureus isolated*  This patient requires contact isolation precautions per [place] law  Contact precautions are not required in [place] for nasal surveillance cultures  --   --    LEGIONELLA URINARY ANTIGEN   --   --   --  Negative   STREP PNEUMONIAE ANTIGEN, URINE   --   --   --  Negative       EKG:   Imaging: I have personally reviewed pertinent reports  and I have personally reviewed pertinent films in PACS    Intake and Output  I/O       02/12 0701 02/13 0700 02/13 0701 02/14 0700    P  O  120 180    I V  (mL/kg) 54 6 (0 3) 78 (0 5)    IV Piggyback 300 1230    Total Intake(mL/kg) 474 6 (2 8) 1488 (8 8)    Urine (mL/kg/hr) 400 395 (0 1)    Emesis/NG output  0    Total Output 400 395    Net +74 6 +1093          Unmeasured Emesis Occurrence  4 x          Height and Weights   Height: 5' 11" (180 3 cm)  IBW (Ideal Body Weight): 75 3 kg  Body mass index is 52 12 kg/m²  Weight (last 2 days)     Date/Time Weight    02/13/22 0600 170 (373 68)    02/13/22 0000 170 (374 12)            Nutrition       Diet Orders   (From admission, onward)             Start     Ordered    02/12/22 1906  Diet Cardiovascular; Cardiac; Sodium 2 GM, Fluid Restriction 1500 ML  Diet effective now        References:    Nutrtion Support Algorithm Enteral vs  Parenteral   Question Answer Comment   Diet Type Cardiovascular    Cardiac Cardiac    Other Restriction(s): Sodium 2 GM    Other Restriction(s): Fluid Restriction 1500 ML    RD to adjust diet per protocol?  Yes        02/12/22 1906                  Active Medications  Scheduled Meds:  Current Facility-Administered Medications   Medication Dose Route Frequency Provider Last Rate    acetaminophen  650 mg Oral Q6H PRN Michelene Batch, CRNP      albuterol  2 5 mg Nebulization Q6H PRN Michelene Batch, CRNP      bumetanide  1 5 mg/hr Intravenous Continuous Michelene Batch, CRNP 1 5 mg/hr (02/13/22 1718)    calcium carbonate  500 mg Oral TID PRN TERS Szymanski      chlorhexidine  15 mL Swish & Spit Q12H [address] Jevon Szymanski      metolazone  10 mg Oral Daily Jevon Szymanski      metoprolol succinate  25 mg Oral Q12H [address] TRES Szymanski      midodrine  10 mg Oral TID AC TRES Szymanski      mupirocin   Nasal Q12H [address] TRES Szymanski      norepinephrine  1-30 mcg/min Intravenous Titrated TRES Flowers      NxStage K 2/Ca 3  20,000 mL Dialysis Continuous Sarah Cortez MD      ondansetron  4 mg Intravenous Q6H PRN TRES Szymanski      oxyCODONE  10 mg Oral Q4H PRN TRES Szymanski      oxyCODONE  5 mg Oral Q4H PRN TRES Szymanski      pantoprazole  40 mg Oral Daily Before Breakfast TRES Szymanski      senna-docusate sodium  2 tablet Oral BID TRES Szymanski      vancomycin  15 mg/kg (Adjusted) Intravenous Q24H Nicholas Reza MD       Continuous Infusions:  bumetanide, 1 5 mg/hr, Last Rate: 1 5 mg/hr (02/13/22 1718)  norepinephrine, 1-30 mcg/min  NxStage K 2/Ca 3, 20,000 mL      PRN Meds:   acetaminophen, 650 mg, Q6H PRN  albuterol, 2 5 mg, Q6H PRN  calcium carbonate, 500 mg, TID PRN  ondansetron, 4 mg, Q6H PRN  oxyCODONE, 10 mg, Q4H PRN  oxyCODONE, 5 mg, Q4H PRN        Invasive Devices Review  Invasive Devices  Report    Peripheral Intravenous Line            Peripheral IV 02/10/22 Right;Ventral (anterior) Forearm 3 days    Peripheral IV 02/13/22 Distal;Right;Upper;Ventral (anterior) Arm <1 day    Peripheral IV 02/13/22 Right Hand <1 day          Hemodialysis Catheter            HD Temporary Double Catheter <1 day          Drain            Urethral Catheter Other (Comment) 10 2 Fr  2 days                Rationale for remaining devices:   ---------------------------------------------------------------------------------------  Advance Directive and Living Will:      Power of :    POLST: ---------------------------------------------------------------------------------------  Care Time Delivered:         TRES Escalante      Portions of the record may have been created with voice recognition software  Occasional wrong word or "sound a like" substitutions may have occurred due to the inherent limitations of voice recognition software    Read the chart carefully and recognize, using context, where substitutions have occurred

## 2022-02-15 NOTE — ASSESSMENT & PLAN NOTE
Wt Readings from Last 3 Encounters:   02/14/22 (!) 168 kg (370 lb 6 oz)   02/12/22 (!) 170 kg (374 lb 12 5 oz)   12/29/21 (!) 152 kg (336 lb)     · Patient noted to be volume overload with a increase in his weight of 20 kg since December and anasarca  · Patient on room air with no respiratory distress  · Continue CVVH, running negative as outlined   · Goal will be to maintain a negative fluid balance as able

## 2022-02-15 NOTE — PLAN OF CARE
Problem: Potential for Falls  Goal: Patient will remain free of falls  Description: INTERVENTIONS:  - Educate patient/family on patient safety including physical limitations  - Instruct patient to call for assistance with activity   - Consult OT/PT to assist with strengthening/mobility   - Keep Call bell within reach  - Keep bed low and locked with side rails adjusted as appropriate  - Keep care items and personal belongings within reach  - Initiate and maintain comfort rounds  - Make Fall Risk Sign visible to staff  - Apply yellow socks and bracelet for high fall risk patients  - Consider moving patient to room near nurses station  Outcome: Progressing     Problem: MOBILITY - ADULT  Goal: Maintain or return to baseline ADL function  Description: INTERVENTIONS:  -  Assess patient's ability to carry out ADLs; assess patient's baseline for ADL function and identify physical deficits which impact ability to perform ADLs (bathing, care of mouth/teeth, toileting, grooming, dressing, etc )  - Assess/evaluate cause of self-care deficits   - Assess range of motion  - Assess patient's mobility; develop plan if impaired  - Assess patient's need for assistive devices and provide as appropriate  - Encourage maximum independence but intervene and supervise when necessary  - Involve family in performance of ADLs  - Assess for home care needs following discharge   - Consider OT consult to assist with ADL evaluation and planning for discharge  - Provide patient education as appropriate  Outcome: Progressing  Goal: Maintains/Returns to pre admission functional level  Description: INTERVENTIONS:  - Perform BMAT or MOVE assessment daily    - Set and communicate daily mobility goal to care team and patient/family/caregiver     - Collaborate with rehabilitation services on mobility goals if consulted  - Record patient progress and toleration of activity level   Outcome: Progressing     Problem: Prexisting or High Potential for Compromised Skin Integrity  Goal: Skin integrity is maintained or improved  Description: INTERVENTIONS:  - Identify patients at risk for skin breakdown  - Assess and monitor skin integrity  - Assess and monitor nutrition and hydration status  - Monitor labs   - Assess for incontinence   - Turn and reposition patient  - Assist with mobility/ambulation  - Relieve pressure over bony prominences  - Avoid friction and shearing  - Provide appropriate hygiene as needed including keeping skin clean and dry  - Evaluate need for skin moisturizer/barrier cream  - Collaborate with interdisciplinary team   - Patient/family teaching  - Consider wound care consult   Outcome: Progressing     Problem: METABOLIC, FLUID AND ELECTROLYTES - ADULT  Goal: Electrolytes maintained within normal limits  Description: INTERVENTIONS:  - Monitor labs and assess patient for signs and symptoms of electrolyte imbalances  - Administer electrolyte replacement as ordered  - Monitor response to electrolyte replacements, including repeat lab results as appropriate  - Instruct patient on fluid and nutrition as appropriate  Outcome: Progressing  Goal: Fluid balance maintained  Description: INTERVENTIONS:  - Monitor labs   - Monitor I/O and WT  - Instruct patient on fluid and nutrition as appropriate  - Assess for signs & symptoms of volume excess or deficit  Outcome: Progressing  Goal: Glucose maintained within target range  Description: INTERVENTIONS:  - Monitor Blood Glucose as ordered  - Assess for signs and symptoms of hyperglycemia and hypoglycemia  - Administer ordered medications to maintain glucose within target range  - Assess nutritional intake and initiate nutrition service referral as needed  Outcome: Progressing     Problem: Nutrition/Hydration-ADULT  Goal: Nutrient/Hydration intake appropriate for improving, restoring or maintaining nutritional needs  Description: Monitor and assess patient's nutrition/hydration status for malnutrition  Collaborate with interdisciplinary team and initiate plan and interventions as ordered  Monitor patient's weight and dietary intake as ordered or per policy  Utilize nutrition screening tool and intervene as necessary  Determine patient's food preferences and provide high-protein, high-caloric foods as appropriate       INTERVENTIONS:  - Monitor oral intake, urinary output, labs, and treatment plans  - Assess nutrition and hydration status and recommend course of action  - Evaluate amount of meals eaten  - Assist patient with eating if necessary   - Allow adequate time for meals  - Recommend/ encourage appropriate diets, oral nutritional supplements, and vitamin/mineral supplements  - Order, calculate, and assess calorie counts as needed  - Recommend, monitor, and adjust tube feedings and TPN/PPN based on assessed needs  - Assess need for intravenous fluids  - Provide specific nutrition/hydration education as appropriate  - Include patient/family/caregiver in decisions related to nutrition  Outcome: Progressing

## 2022-02-15 NOTE — ASSESSMENT & PLAN NOTE
Lab Results   Component Value Date    EGFR 31 02/15/2022    EGFR 28 02/14/2022    EGFR 24 02/14/2022    CREATININE 2 14 (H) 02/15/2022    CREATININE 2 33 (H) 02/14/2022    CREATININE 2 64 (H) 02/14/2022       · Patient admitted to [address] [place] [address] due to mechanical fall and volume overload  · Patient on lasix 80 BID, zaroxolyn and midodrine prior to admission  · Nephrology was consulted and the patient was trialed on a lasix infusion which was transitioned to a bumex infusion  · After he failed to make progress with the infusion; CVVH was started  · Continue to run with goal negative 75 ml/hr   · Nephrology following

## 2022-02-15 NOTE — PROGRESS NOTES
[address] [facility]  Progress Note - Antonella Mayer 1960, 60 y o  male MRN: [id number]  Unit/Bed#: ICU 02 Encounter: [id number]  Primary Care Provider: Marine Ureña DO   Date and time admitted to hospital: 2/12/2022  6:51 PM    * Acute renal failure superimposed on stage 3b chronic kidney disease [facility])  Assessment & Plan  Lab Results   Component Value Date    EGFR 31 02/15/2022    EGFR 28 02/14/2022    EGFR 24 02/14/2022    CREATININE 2 14 (H) 02/15/2022    CREATININE 2 33 (H) 02/14/2022    CREATININE 2 64 (H) 02/14/2022       · Patient admitted to [place] [address] due to mechanical fall and volume overload  · Patient on lasix 80 BID, zaroxolyn and midodrine prior to admission  · Nephrology was consulted and the patient was trialed on a lasix infusion which was transitioned to a bumex infusion  · After he failed to make progress with the infusion; CVVH was started  · Continue to run with goal negative 75 ml/hr   · Nephrology following    Acute on chronic diastolic (congestive) heart failure ([facility] [address] )  Assessment & Plan  Wt Readings from Last 3 Encounters:   02/14/22 (!) 168 kg (370 lb 6 oz)   02/12/22 (!) 170 kg (374 lb 12 5 oz)   12/29/21 (!) 152 kg (336 lb)     · Patient noted to be volume overload with a increase in his weight of 20 kg since December and anasarca  · Patient on room air with no respiratory distress  · Continue CVVH, running negative as outlined   · Goal will be to maintain a negative fluid balance as able    RLL pneumonia  Assessment & Plan  · CT imaging on 2/8 revealed right lower lobe airspace opacities, concerning for pneumonia  · Procalcitonin 0 79 > 0 64  · MRSA positive sputum  · Sputum culture positive for staph aureus   · Continue Vancomycin, Day # 3    Acute on chronic anemia  Assessment & Plan  · Due to abdominal wall hematoma, chronic anticoagulation  · s/p 3 units PRBC, last transfusion on 2/11  · Surgery following up at [facility] and has signed off - no intervention at this time    Abdominal wall hematoma  Assessment & Plan  · First diagnosed in december and underwent IR procedure   · Slightly increased in size  · Received 3 unit PRBC  · Surgery consulted and signed off - no intervention at this time  · If patient continus to bleed may require repeat IR intervention    Hyponatremia  Assessment & Plan  · Secondary to volume overload  · Most likely to correct with dialysis  · Continue to monitor with repeat BMP        Hypotension  Assessment & Plan  · Appears chronic in nature - patient maintained on midodrine at home  · Midodrine increased 10 mg TID  · Continue to monitor   · Maintain MAP > 65    Lymphedema  Assessment & Plan  · Chronic due to venous insufficiency and volume overload    STEFF (obstructive sleep apnea)  Assessment & Plan  · BIPAP as needed    Other cirrhosis of liver (HCC)  Assessment & Plan  · Possibly related to prior alcohol abuse  · Mild ascites noted on cat scan  · Outpatient follow up with GI    Permanent atrial fibrillation (HCC)  Assessment & Plan  · Outpatient regimen consists of: metoprolol + coumadin   · Coumadin held due to abdominal wall hematoma  · Metoprolol was restarted despite being on midodrine 10 mg t i d   · Consider switching metoprolol to digoxin      Urinary retention  Assessment & Plan  · Retrograde urethra gram and fluoroscopically guided urethral catheter placement by IR  · Maintain leahy catheter       ----------------------------------------------------------------------------------------  HPI/24hr events:   CVVH running negative  No acute overnight events     Patient appropriate for transfer out of the ICU today?: No  Disposition: Continue Critical Care   Code Status: Level 1 - Full Code  ---------------------------------------------------------------------------------------  SUBJECTIVE  Patient complains of generalized discomfort    Review of Systems  Review of systems was reviewed and negative unless stated above in HPI/24-hour events   ---------------------------------------------------------------------------------------  OBJECTIVE    Vitals   Vitals:    22 2319 02/15/22 0006 02/15/22 0106 02/15/22 0301   BP:  (!) 83/59 (!) 88/47    Pulse:  104 (!) 110    Resp:  (!) 5 (!) 10    Temp: (!) 96 °F (35 6 °C)   (!) 97 3 °F (36 3 °C)   TempSrc: Tympanic   Temporal   SpO2:  (!) 87% 91%    Weight:       Height:         Temp (24hrs), Av 8 °F (36 6 °C), Min:96 °F (35 6 °C), Max:99 1 °F (37 3 °C)  Current: Temperature: (!) 97 3 °F (36 3 °C)          Respiratory:  SpO2: SpO2: 91 %, SpO2 Activity: SpO2 Activity: At Rest  Nasal Cannula O2 Flow Rate (L/min): 2 L/min    Invasive/non-invasive ventilation settings   Respiratory  Report   Lab Data (Last 4 hours)    None         O2/Vent Data (Last 4 hours)    None                Physical Exam  Vitals reviewed  Constitutional:       Appearance: He is obese  He is ill-appearing  HENT:      Head: Normocephalic  Nose: Nose normal       Mouth/Throat:      Mouth: Mucous membranes are moist       Pharynx: Oropharynx is clear  Eyes:      Extraocular Movements: Extraocular movements intact  Conjunctiva/sclera: Conjunctivae normal       Pupils: Pupils are equal, round, and reactive to light  Cardiovascular:      Rate and Rhythm: Tachycardia present  Rhythm irregular  Pulses: Normal pulses  Heart sounds: Normal heart sounds  Pulmonary:      Effort: Pulmonary effort is normal  No respiratory distress  Breath sounds: Rales present  Abdominal:      General: Bowel sounds are normal       Palpations: Abdomen is soft  Genitourinary:     Comments: Azeem present  Musculoskeletal:      Cervical back: Normal range of motion and neck supple  Right lower leg: Edema present  Left lower leg: Edema present  Skin:     General: Skin is warm and dry  Coloration: Skin is pale  Neurological:      General: No focal deficit present        Mental Status: He is alert and oriented to person, place, and time  Psychiatric:         Mood and Affect: Mood normal          Thought Content: Thought content normal        Laboratory and Diagnostics:  Results from last 7 days   Lab Units 02/14/22  0531 02/13/22  0534 02/12/22  1449 02/12/22  0430 02/11/22  1502 02/11/22  0641 02/10/22  1414 02/10/22  0911 02/10/22  0911 02/09/22  0913 02/09/22  0520 02/08/22  1732 02/08/22  1256   WBC Thousand/uL 7 26 6 38  --  6 02  --  6 53  --   --  6 39  --  5 50  --  5 58   HEMOGLOBIN g/dL 7 1* 7 1* 7 4* 7 4* 7 6* 7 2* 7 5*   < > 7 7*   < > 6 6*   < > 7 1*   HEMATOCRIT % 24 0* 23 2* 23 7* 24 4* 24 4* 23 9* 24 3*   < > 25 5*   < > 21 4*   < > 23 1*   PLATELETS Thousands/uL 146* 165  --  178  --  170  --   --  192  --  178  --  184   NEUTROS PCT %  --  79*  --  76*  --  75  --   --  76*  --  76*  --  75   MONOS PCT %  --  8  --  10  --  9  --   --  9  --  10  --  11    < > = values in this interval not displayed       Results from last 7 days   Lab Units 02/15/22  0021 02/14/22  1803 02/14/22  1154 02/14/22  0531 02/13/22  2300 02/13/22  0534 02/12/22  0430 02/10/22  0636 02/09/22  0520   SODIUM mmol/L 130* 129* 128* 127* 128* 128* 128*   < > 129*   POTASSIUM mmol/L 3 8 4 1 3 8 4 1 3 9 4 1 3 8   < > 3 2*   CHLORIDE mmol/L 93* 92* 90* 89* 87* 87* 89*   < > 90*   CO2 mmol/L 31 32 31 31 32 32 33*   < > 35*   ANION GAP mmol/L 6 5 7 7 9 9 6   < > 4   BUN mg/dL 50* 55* 64* 78* 91* 92* 89*   < > 80*   CREATININE mg/dL 2 14* 2 33* 2 64* 2 88* 3 32* 3 30* 3 26*   < > 1 87*   CALCIUM mg/dL 9 2 9 3 8 8 8 7 8 4 8 5 8 4   < > 8 0*   GLUCOSE RANDOM mg/dL 110 109 122 122 110 122 122   < > 141*   ALT U/L  --   --   --   --   --  15  --   --  11*   AST U/L  --   --   --   --   --  23  --   --  22   ALK PHOS U/L  --   --   --   --   --  119*  --   --  143*   ALBUMIN g/dL  --   --   --   --   --  2 1*  --   --  1 9*   TOTAL BILIRUBIN mg/dL  --   --   --   --   --  0 93  --   --  0 82    < > = values in this interval not displayed  Results from last 7 days   Lab Units 02/15/22  0021 02/14/22  1803 02/14/22  1154 02/14/22  0531 02/13/22  2300 02/13/22  0534 02/09/22  0520   MAGNESIUM mg/dL 2 4 2 4 2 4 2 5 2 6 2 6 2 2   PHOSPHORUS mg/dL 4 5* 4 8* 5 7* 6 3* 7 6*  --   --       Results from last 7 days   Lab Units 02/14/22  0531 02/11/22  0846 02/10/22  0911 02/08/22  1256   INR  1 22* 1 23* 1 53* 1 63*   PTT seconds  --   --   --  52*              ABG:    VBG:    Results from last 7 days   Lab Units 02/14/22  0531 02/13/22  2300 02/09/22  0520 02/08/22  1732   PROCALCITONIN ng/ml 0 64* 0 79* 0 55* 0 54*       Micro  Results from last 7 days   Lab Units 02/12/22  2027 02/10/22  1747 02/08/22  1910 02/08/22  1732   BLOOD CULTURE  No Growth at 48 hrs  No Growth at 48 hrs   --   --   --    SPUTUM CULTURE   --   --  4+ Growth of Staphylococcus aureus*  4+ Growth of Methicillin Resistant Staphylococcus aureus*  2+ Growth of   --    GRAM STAIN RESULT   --   --  1+ Epithelial cells per low power field*  1+ Polys*  3+ Gram positive cocci in pairs*  Rare Gram negative rods*  --    MRSA CULTURE ONLY   --  Methicillin Resistant Staphylococcus aureus isolated*  This patient requires contact isolation precautions per [place] law  Contact precautions are not required in [place] for nasal surveillance cultures  --   --    LEGIONELLA URINARY ANTIGEN   --   --   --  Negative   STREP PNEUMONIAE ANTIGEN, URINE   --   --   --  Negative       Imaging: I have personally reviewed pertinent reports  Intake and Output  I/O       02/13 0701 02/14 0700 02/14 0701  02/15 0700    P  O  230 200    I V  (mL/kg) 119 9 (0 7) 66 (0 4)    IV Piggyback 1230 950    Total Intake(mL/kg) 1579 9 (9 4) 1216 (7 2)    Urine (mL/kg/hr) 548 (0 1) 30 (0)    Emesis/NG output 0     Other 543 1806    Total Output 1091 1836    Net +488 9 620          Unmeasured Emesis Occurrence 4 x           Height and Weights   Height: 5' 11" (180 3 cm)  IBW (Ideal Body Weight): 75 3 kg  Body mass index is 51 66 kg/m²  Weight (last 2 days)     Date/Time Weight    02/14/22 0600 168 (370 37)    02/13/22 0600 170 (373 68)    02/13/22 0000 170 (374 12)            Nutrition       Diet Orders   (From admission, onward)             Start     Ordered    02/14/22 1003  Dietary nutrition supplements  Once        Question Answer Comment   Select Supplement: Nepro-Vanilla    Frequency Lunch, Dinner        02/14/22 1002    02/12/22 1906  Diet Cardiovascular; Cardiac; Sodium 2 GM, Fluid Restriction 1500 ML  Diet effective now        References:    Nutrtion Support Algorithm Enteral vs  Parenteral   Question Answer Comment   Diet Type Cardiovascular    Cardiac Cardiac    Other Restriction(s): Sodium 2 GM    Other Restriction(s): Fluid Restriction 1500 ML    RD to adjust diet per protocol?  Yes        02/12/22 1906                  Active Medications  Scheduled Meds:  Current Facility-Administered Medications   Medication Dose Route Frequency Provider Last Rate    acetaminophen  650 mg Oral Q6H PRN Lorence Flick, CRNP      albuterol  2 5 mg Nebulization Q6H PRN Lorence Flick, CRNP      calcium carbonate  500 mg Oral TID PRN Lorence Flick, CRNP      calcium gluconate  1 g Intravenous Once Faraz Adkins MD      heparin (porcine)  7,500 Units Subcutaneous [place], [place]      metoprolol succinate  25 mg Oral Q12H [address] [place], [place]      midodrine  10 mg Oral TID AC Lorence Flick, CRNP      mupirocin   Nasal Q12H [address] Lorence Flick, [address]      NxStage K 2/Ca 3  20,000 mL Dialysis Continuous Krysta Ferraro MD      ondansetron  4 mg Intravenous Q6H PRN Lorence Flick, CRNP      oxyCODONE  10 mg Oral Q4H PRN Lorence Flick, CRNP      oxyCODONE  5 mg Oral Q4H PRN Lorence Flick, CRNP      pantoprazole  40 mg Oral Daily Before Breakfast Lorence Flick, CRNP      potassium chloride  40 mEq Intravenous Once Faraz Adkins MD      senna-docusate sodium  2 tablet Oral BID TRES Payne      vancomycin  15 mg/kg (Adjusted) Intravenous Q24H Amber Wooten MD Stopped (02/14/22 1400)     Continuous Infusions:  NxStage K 2/Ca 3, 20,000 mL      PRN Meds:   acetaminophen, 650 mg, Q6H PRN  albuterol, 2 5 mg, Q6H PRN  calcium carbonate, 500 mg, TID PRN  ondansetron, 4 mg, Q6H PRN  oxyCODONE, 10 mg, Q4H PRN  oxyCODONE, 5 mg, Q4H PRN        Invasive Devices Review  Invasive Devices  Report    Peripheral Intravenous Line            Peripheral IV 02/13/22 Distal;Right;Upper;Ventral (anterior) Arm 1 day    Peripheral IV 02/13/22 Right Hand 1 day          Hemodialysis Catheter            HD Temporary Double Catheter 1 day          Drain            Urethral Catheter Other (Comment) 10 2 Fr  3 days                Rationale for remaining devices: medically necessary   ---------------------------------------------------------------------------------------  Advance Directive and Living Will:      Power of :    POLST:    ---------------------------------------------------------------------------------------  Care Time Delivered:   No Critical Care time spent       TRES Burk      Portions of the record may have been created with voice recognition software  Occasional wrong word or "sound a like" substitutions may have occurred due to the inherent limitations of voice recognition software    Read the chart carefully and recognize, using context, where substitutions have occurred

## 2022-02-15 NOTE — ASSESSMENT & PLAN NOTE
· Outpatient regimen consists of: metoprolol + coumadin   · Coumadin held due to abdominal wall hematoma  · Metoprolol was restarted despite being on midodrine 10 mg t i d   · Consider switching metoprolol to digoxin

## 2022-02-15 NOTE — PROCEDURES
NEPHROLOGY DIALYSIS PROCEDURE NOTE      Patient seen and examined on CVVH, tolerating procedure well  All documentation, labs, medications were reviewed by myself, and the treatment plan was reviewed with nurse and patient       Seen on Dialysis at : 10:05 AM  Dialysis Access: Right IJ non tunneled dialysis catheter  Vitals: 90/53  Events:  Filter clotted overnight, ultrafiltration has fluctuated based on the blood pressure, was able to run net negative-75 mL/hour for few hours, the primarily 50-60 mL/hr  CVVH: Therapy Fluid: 3 5 L/hr; Ultrafiltration: net negative goal 75 mL/hr ; Blood Flow Rate: 250 mL/min; Dialysate: 2K/3Ca    Assessment/Plan:    Acute renal failure/acute tubular necrosis  --presumed to be secondary to cardiorenal syndrome with underlying acute tubular necrosis and hypotension and contrast associated nephropathy with underlying chronic kidney disease  --oliguria, Gann catheter in place --> no objections discontinue the Gann catheter  --patient started on renal replacement therapy due to worsening renal function volume overload with poor response to diuretic  --patient started on CRRT due to low blood pressure, volume overload and worsening renal function  --started CVVH on  2/13/22  --continue current management     Anemia  - low but stable  - transfuse if hemoglobin less than 7  -history of an abdominal wall hematoma closely monitor     Hyponatremia  - volume mediated  - continue ultrafiltration  - stable     Blood pressure/volume status  - hypotension, with underlying cardiorenal syndrome  - blood pressure stable but low  -currently ultrafiltrate in on CVVH, goal net -75 mL/hour  -midodrine 10 mg 3 times a day, midodrine increased to 15 mg  -colloid IV albumin also added  -metoprolol 25 mg twice a day     Hyperphosphatemia  --trending down  --monitor with CVVH     Chronic kidney disease stage IIIB  --appears to have had some underlying progression of disease  --prior baseline creatinine 1 6-1 9 mg/dL  --outpatient nephrologist Dr Gigi Fuentes     Urinary retention  --Gann catheter in place  --if the urine output becomes nonsignificant or even absent can discontinue Gann catheter     History of trauma to the abdominal wall resulting in hematoma  --status post IR pelvic angiogram on 12/4/2021 status post embolization  --recent CT scan reports mild increase in the size hematoma     Acute on chronic diastolic congestive heart failure  --with dilated cardiomyopathy and cirrhosis  --poor response to diuretic  --poor urine output  --worsening renal function  --concurrent hypotension  --now on CVVH    Review of Systems: The entire 12 point ROS has been reviewed    Complaining of abdominal pain nausea and vomiting this morning    Physical Exam:    General:  No acute distress, morbidly obese, chronically ill-appearing  Skin:  Poor skin turgor  CVS:  S1-S2 appreciated regular rate and rhythm  Lungs:  Rales with decreased breath sounds at the bases  Abdomen:  Nontender, no guarding, distended  Access:  Non tunneled right IJ dialysis catheter with no erythema or exudate  Extremities:  Massive edema upper and lower extremities  Neuro:  No asterixis          Current Facility-Administered Medications:     acetaminophen (TYLENOL) tablet 650 mg, 650 mg, Oral, Q6H PRN, TRES Art    albumin human (FLEXBUMIN) 25 % injection 25 g, 25 g, Intravenous, Q6H, TRES Miranda    albuterol inhalation solution 2 5 mg, 2 5 mg, Nebulization, Q6H PRN, TRES Art    calcium carbonate (TUMS) chewable tablet 500 mg, 500 mg, Oral, TID PRN, TRES Art, 500 mg at 74/19/65 5948    folic acid (FOLVITE) tablet 1 mg, 1 mg, Oral, Daily, TRES Miranda, 1 mg at 02/15/22 1007    heparin (porcine) subcutaneous injection 7,500 Units, 7,500 Units, Subcutaneous, Q8H [address], Naima Pruitt PA-C, 7,500 Units at 02/15/22 0626    magnesium hydroxide (MILK OF MAGNESIA) oral suspension 30 mL, 30 mL, Oral, Once, Ale Hawkins, TRES    metoprolol succinate (TOPROL-XL) 24 hr tablet 25 mg, 25 mg, Oral, Q12H [address], Naima Pruitt PA-C, 25 mg at 02/14/22 2130    midodrine (PROAMATINE) tablet 15 mg, 15 mg, Oral, TID AC, Ale Hawkins, TRES    midodrine (PROAMATINE) tablet 5 mg, 5 mg, Oral, Once, Ale Hawkins, TRES    mupirocin (BACTROBAN) 2 % nasal ointment, , Nasal, Q12H [address], TRES Mccain, 1 application at 09/28/72 0824    NxStage K 2/Ca 3 dialysis solution (RFP-400) 20,000 mL, 20,000 mL, Dialysis, Continuous, Dagoberto Ann MD, 20,000 mL at 02/14/22 2101    ondansetron (ZOFRAN) injection 4 mg, 4 mg, Intravenous, Q6H PRN, TRES Mccain, 4 mg at 02/15/22 1331    oxyCODONE (ROXICODONE) IR tablet 5 mg, 5 mg, Oral, Q4H PRN, TRES Foley    pantoprazole (PROTONIX) EC tablet 40 mg, 40 mg, Oral, Daily Before Breakfast, TRES Hill, 40 mg at 02/15/22 2487    polyethylene glycol (MIRALAX) packet 17 g, 17 g, Oral, BID, TRES Foley    senna-docusate sodium (SENOKOT S) 8 6-50 mg per tablet 2 tablet, 2 tablet, Oral, BID, TRES Mccain, 2 tablet at 02/15/22 2843    thiamine tablet 100 mg, 100 mg, Oral, Daily, TRES Foley, 100 mg at 02/15/22 9626    vancomycin (VANCOCIN) 1,750 mg in sodium chloride 0 9 % 500 mL IVPB, 15 mg/kg (Adjusted), Intravenous, Q24H, Lisa Tee MD, Stopped at 02/14/22 1400

## 2022-02-15 NOTE — ASSESSMENT & PLAN NOTE
· Appears chronic in nature - patient maintained on midodrine at home  · Midodrine increased 10 mg TID  · Continue to monitor   · Maintain MAP > 65

## 2022-02-15 NOTE — OCCUPATIONAL THERAPY NOTE
Occupational Therapy         Patient Name: Antonella Mayer  QTKLX'D Date: 2/15/2022      Pt remains on CVVH, will defer OT eval at this time  Will continue to follow as appropriate   Desi Harris OT

## 2022-02-15 NOTE — ASSESSMENT & PLAN NOTE
· Retrograde urethra gram and fluoroscopically guided urethral catheter placement by IR  · Maintain leahy catheter

## 2022-02-15 NOTE — PHYSICAL THERAPY NOTE
PHYSICAL THERAPY NOTE          Patient Name: Marta Goldstein  KPQTI'V Date: 2/15/2022     Pt remains on CVVH, will defer PT eval at this time  Will continue to follow as appropriate   Corinna Olson, PT

## 2022-02-15 NOTE — ASSESSMENT & PLAN NOTE
· Due to abdominal wall hematoma, chronic anticoagulation  · s/p 3 units PRBC, last transfusion on 2/11  · Surgery following up at [facility] and has signed off - no intervention at this time

## 2022-02-15 NOTE — ASSESSMENT & PLAN NOTE
· Secondary to volume overload  · Most likely to correct with dialysis  · Continue to monitor with repeat BMP

## 2022-02-15 NOTE — ASSESSMENT & PLAN NOTE
· CT imaging on 2/8 revealed right lower lobe airspace opacities, concerning for pneumonia  · Procalcitonin 0 79 > 0 64  · MRSA positive sputum  · Sputum culture positive for staph aureus   · Continue Vancomycin, Day # 3

## 2022-02-15 NOTE — PROGRESS NOTES
Vancomycin IV Pharmacy-to-Dose Consultation    Marissa Crowley is a 57 y o  male who is currently receiving Vancomycin IV with management by the Pharmacy Consult service  Assessment/Plan:  The patient was reviewed  No signs or symptoms of nephrotoxicity and/or infusion reactions were documented in the chart  Remains on CRRT per nephrology recommendations  Trough level this afternoon 13 9, drawn prior to 3rd dose  Potential Nephrotoxicity Factors:  Medications: none identified  Patient Factors: hypotension, renal dysfunction, elderly    Based on todays assessment, continue current vancomycin (day # 3) dosing of 1,750 mg q24h, with a plan for trough to be drawn at 1215 on 2/17  We will continue to follow the patients culture results and clinical progress daily      Carisa Flores, PharmD, [address] [facility] Clinical Pharmacist  [phone] or via [address] [place] [address]

## 2022-02-16 PROBLEM — R11.10 EMESIS: Status: ACTIVE | Noted: 2022-01-01

## 2022-02-16 PROBLEM — I46.9 CARDIAC ARREST (HCC): Status: ACTIVE | Noted: 2022-01-01

## 2022-02-16 PROBLEM — J96.02 ACUTE RESPIRATORY FAILURE WITH HYPOXIA AND HYPERCAPNIA (HCC): Status: ACTIVE | Noted: 2021-02-22

## 2022-02-16 NOTE — PROGRESS NOTES
present during anointing by priest Orellana with spouse  Please contact  with any further concerns or needs

## 2022-02-16 NOTE — ASSESSMENT & PLAN NOTE
· Now with leukocytosis, hypothermia, lactic acidosis, hypotension, tachycardia  · Continue Vanco and add cefepime   · Patient not a candidate for 30ml/kg fluid bolus secondary to his renal failure   · Continue vasopressor support  · Currently requiring Levophed, vasopressin, and subha  · Obtain ECHO  · Obtain VBG to asses Sv02  · Continue to trend lactic

## 2022-02-16 NOTE — PROCEDURES
Arterial Line Insertion    Date/Time: 2/16/2022 12:16 AM  Performed by: TRES Senior  Authorized by: TRES Senior     Patient location:  ICU  Consent:     Consent obtained:  Verbal    Consent given by:  Patient    Risks discussed:  Bleeding, ischemia, repeat procedure, infection and pain  Universal protocol:     Procedure explained and questions answered to patient or proxy's satisfaction: yes      Relevant documents present and verified: yes      Test results available and properly labeled: yes      Radiology Images displayed and confirmed  If images not available, report reviewed: yes      Required blood products, implants, devices, and special equipment available: yes      Site/side marked: yes      Immediately prior to procedure a time out was called: yes      Patient identity confirmed:  Arm band and hospital-assigned identification number  Indications:     Indications: hemodynamic monitoring, multiple ABGs, continuous blood pressure monitoring and frequent labs / infusion    Pre-procedure details:     Skin preparation:  Chlorhexidine    Preparation: Patient was prepped and draped in sterile fashion    Procedure details:     Location / Tip of Catheter:  Radial    Laterality:  Right    Needle gauge:  18 G    Number of attempts:  2    Successful placement: yes      Transducer: waveform confirmed    Post-procedure details:     Post-procedure:  Sterile dressing applied and sutured    CMS:  Unchanged    Patient tolerance of procedure:   Tolerated well, no immediate complications

## 2022-02-16 NOTE — ANESTHESIA POSTPROCEDURE EVALUATION
Post-Op Assessment Note    CV Status:  Unstable    Pain management: adequate     Mental Status:  Somnolent   Airway Patency:  Patent       Staff: Anesthesiologist         No complications documented      BP      Temp     Pulse    Resp     SpO2

## 2022-02-16 NOTE — ASSESSMENT & PLAN NOTE
· Patient PEA cardiac arrest on 2/15  · Received 2 rounds of CPR, Epi and bicarb and obtained ROSC and was intubated   · Continue vasopressor support to maintain MAP > 65  · Obtain 12 lead EKG   · Continue with frequent labs   · Suspect etiology likely respiratory in nature

## 2022-02-16 NOTE — PROGRESS NOTES
02/16/22 1200   Clinical Encounter Type   Visited With Family   Routine Visit Follow-up   Continue Visiting Yes

## 2022-02-16 NOTE — QUICK NOTE
Call placed to patient's wife; however, no answer and only 1 number listed in the chart  Will attempt to call again shortly

## 2022-02-16 NOTE — PROGRESS NOTES
02/16/22 1400   Clinical Encounter Type   Visited With Family   Routine Visit Follow-up   Continue Visiting Yes      contacted julienne andrews at [place] [address] to administer annointing of the sick  Family can contact pastoral care office with any needs or concerns

## 2022-02-16 NOTE — ANESTHESIA PREPROCEDURE EVALUATION
Procedure:  EMERGENT INTUBATION    Relevant Problems   CARDIO   (+) Chronic right-sided congestive heart failure (HCC)   (+) Hypertensive nephrosclerosis   (+) Permanent atrial fibrillation (HCC)      GI/HEPATIC   (+) Cirrhosis of liver with ascites (HCC)   (+) Other cirrhosis of liver (HCC)      /RENAL   (+) Acute kidney injury superimposed on chronic kidney disease (HCC)   (+) Acute renal failure superimposed on stage 3b chronic kidney disease (HCC)   (+) Hypertensive heart and chronic kidney disease with heart failure and stage 1 through stage 4 chronic kidney disease, or chronic kidney disease (HCC)   (+) Hypertensive nephrosclerosis      HEMATOLOGY   (+) Acute blood loss anemia   (+) Acute on chronic anemia   (+) Anemia   (+) Pancytopenia (HCC)   (+) Thrombocytopenia (HCC)      PULMONARY   (+) STEFF (obstructive sleep apnea)   (+) Pleural effusion, right   (+) RLL pneumonia   (+) SOB (shortness of breath)   (+) Smoking      Other   (+) Lesion of spleen             Anesthesia Plan  ASA Score- 4 Emergent    Anesthesia Type- general with ASA Monitors  Additional Monitors:   Airway Plan: ETT  Comment: Called emergently to ICU for intubation in progress, attempts already made, OG placed for vomiting          Plan Factors-    Induction-     Postoperative Plan-     Informed Consent-

## 2022-02-16 NOTE — PROGRESS NOTES
[address] [facility]  Progress Note - Jacquelyn Pal 1960, 60 y o  male MRN: [id number]  Unit/Bed#: ICU 02 Encounter: [id number]  Primary Care Provider: Becky Miranda DO   Date and time admitted to hospital: 2/12/2022  6:51 PM    Severe sepsis with septic shock [facility])  Assessment & Plan  · Now with leukocytosis, hypothermia, lactic acidosis, hypotension, tachycardia  · Continue Vanco and add cefepime   · Patient not a candidate for 30ml/kg fluid bolus secondary to his renal failure   · Continue vasopressor support  · Currently requiring Levophed, vasopressin, and subha  · Obtain ECHO  · Obtain VBG to asses Sv02  · Continue to trend lactic     Cardiac arrest [facility])  Assessment & Plan  · Patient PEA cardiac arrest on 2/15  · Received 2 rounds of CPR, Epi and bicarb and obtained ROSC and was intubated   · Continue vasopressor support to maintain MAP > 65  · Obtain 12 lead EKG   · Continue with frequent labs   · Suspect etiology likely respiratory in nature    RLL pneumonia  Assessment & Plan  · CT imaging on 2/8 revealed right lower lobe airspace opacities, concerning for pneumonia  · Procalcitonin 0 79 > 0 64  · MRSA positive sputum  · Sputum culture positive for staph aureus   · Continue Vancomycin, Day # 4/7    Acute respiratory failure with hypoxia and hypercapnia (HCC)  Assessment & Plan  · Patient with increasing oxygen requirements on 2/15 after episodes of vomiting  · He then became increasingly lethargic and unable to clear secretions  · S/p intubation on 2/15 with several aspiration occurrences; as patient was continuously vomiting   · Continue Vanco and Cefepime   · Scheduled nebs added  · Follow up with frequent ABG's  · Patient will likely need a bronch; requiring frequent suctioning  · Maintain sp02 > 88%    Emesis  Assessment & Plan  · Patient with new nausea and vomiting on 2/15; appeared to be bilious at that time  · He was medicated with Zofran which was transitioned to Tigan secondary to prolonged Qtc   · Patient then developed fecal emesis   · Continue OG to suction; is now bilious   · CT abd/chest obtained, follow up on final read     * Acute renal failure superimposed on stage 3b chronic kidney disease [facility])  Assessment & Plan  Lab Results   Component Value Date    EGFR 40 02/15/2022    EGFR 41 02/15/2022    EGFR 39 02/15/2022    CREATININE 1 74 (H) 02/15/2022    CREATININE 1 70 (H) 02/15/2022    CREATININE 1 78 (H) 02/15/2022     · Patient admitted to [place] [address] due to mechanical fall and volume overload  · Patient on lasix 80 BID, zaroxolyn and midodrine prior to admission  · Nephrology was consulted and the patient was trialed on a lasix infusion which was transitioned to a bumex infusion  · After he failed to make progress with diuresis;  CVVH was started  · CVVH has been running with goal -75ml  · Blood was returned during his cardiac arrest & new filter was set up and reinitiated after ROSC   · Continue CVVH for now   · Nephrology following    Acute on chronic diastolic (congestive) heart failure ([facility] [address] )  Assessment & Plan  Wt Readings from Last 3 Encounters:   02/15/22 (!) 166 kg (365 lb 15 4 oz)   02/12/22 (!) 170 kg (374 lb 12 5 oz)   12/29/21 (!) 152 kg (336 lb)     · Patient noted to be volume overload with a increase in his weight of 20 kg since December and anasarca  · Patient on room air with no respiratory distress  · Continue CVVH, running negative as outlined   · Goal will be to maintain a negative fluid balance as able    Hypotension  Assessment & Plan  · Patient had increasing midodrine   · Now d/c'd secondary to ileus   · Continue vasopressor support   · Maintain MAP > 65    Acute on chronic anemia  Assessment & Plan  · Due to abdominal wall hematoma, chronic anticoagulation  · s/p 3 units PRBC, last transfusion on 2/11  · Surgery following up at [facility] and has signed off - no intervention at this time    Abdominal wall hematoma  Assessment & Plan  · First diagnosed in december and underwent IR procedure   · Slightly increased in size  · Received 3 unit PRBC  · Surgery consulted and signed off - no intervention at this time  · If patient continus to bleed may require repeat IR intervention    Lymphedema  Assessment & Plan  · Chronic due to venous insufficiency and volume overload    Hyponatremia  Assessment & Plan  · Secondary to volume overload  · Will correct with dialysis  · Continue to monitor with repeat BMP        STEFF (obstructive sleep apnea)  Assessment & Plan  · Currently ventilated  · BIPAP as needed when extubated     Other cirrhosis of liver ([address] )  Assessment & Plan  · Possibly related to prior alcohol abuse  · Mild ascites noted on cat scan  · Outpatient follow up with GI    Permanent atrial fibrillation [facility])  Assessment & Plan  · Outpatient regimen consists of: metoprolol + coumadin   · Coumadin held due to abdominal wall hematoma  · Midodrine increased on 2/15  · Hold metoprolol for now given hypotension         Urinary retention  Assessment & Plan  · Retrograde urethra gram and fluoroscopically guided urethral catheter placement by IR  · Maintain leahy catheter       ----------------------------------------------------------------------------------------  HPI/24hr events:   Developed nausea and vomiting  Had increasing oxygen requirements likely secondary to aspiration event  Developed hypotension with increasing lethargy  Prior to intubation, patient began vomiting large amounts of fecal/bilious matter  He PEA arrested; ROSC was obtained after 2 rounds of CPR, epi and bicarb  He was intubated with 7 0 ETT; ultimately by anesthesia   Now in septic shock requiring 3 vasopressors     Patient appropriate for transfer out of the ICU today?: No  Disposition: Continue Critical Care   Code Status: Level 1 - Full Code  ---------------------------------------------------------------------------------------  SUBJECTIVE  STUART secondary to intubated/sedated    Review of Systems  Review of systems was unable to be performed secondary to intubated/sedated  ---------------------------------------------------------------------------------------  OBJECTIVE    Vitals   Vitals:    22 0000 22 0021 22 0215 22 0218   BP:       Pulse: (!) 112 (!) 110     Resp: 12 (!) 6     Temp:       TempSrc:       SpO2: 99% 97% 95% 96%   Weight:       Height:         Temp (24hrs), Av 7 °F (36 5 °C), Min:96 9 °F (36 1 °C), Max:98 9 °F (37 2 °C)  Current: Temperature: (!) 96 9 °F (36 1 °C)  Arterial Line BP: 118/70  Arterial Line MAP (mmHg): 88 mmHg    Respiratory:  SpO2: SpO2: 96 %, SpO2 Activity: SpO2 Activity: At Rest  Nasal Cannula O2 Flow Rate (L/min): 4 L/min    Invasive/non-invasive ventilation settings   Respiratory  Report   Lab Data (Last 4 hours)      02/15 2314            pH, Arterial       7 124             pCO2, Arterial       92 5             pO2, Arterial       88 1             HCO3, Arterial       29 7             Base Excess, Arterial       -0 6                  O2/Vent Data       02/15 2300   Most Recent         Vent Mode AC/VC  AC/VC      Resp Rate (BPM) (BPM) 18  25      Vt (mL) (mL) 450  320      FIO2 (%) (%) 100  100      PEEP (cmH2O) (cmH2O) 6  6      MV 7 5  8 2                  Physical Exam  Vitals reviewed  Constitutional:       Appearance: He is morbidly obese  Interventions: He is sedated and intubated  HENT:      Head: Normocephalic  Nose: Nose normal       Mouth/Throat:      Mouth: Mucous membranes are moist       Pharynx: Oropharynx is clear  Comments: ETT and OG tube in place  Eyes:      Conjunctiva/sclera: Conjunctivae normal       Pupils: Pupils are equal, round, and reactive to light  Cardiovascular:      Rate and Rhythm: Tachycardia present  Rhythm irregular  Heart sounds: Normal heart sounds  Pulmonary:      Effort: He is intubated  Breath sounds: Wheezing, rhonchi and rales present     Abdominal: General: There is distension  Comments: OG tube to low continuous suction with bilious output    Genitourinary:     Comments: Gann in place  Musculoskeletal:      Right lower leg: Edema present  Left lower leg: Edema present  Skin:     General: Skin is cool and dry  Neurological:      Comments: sedated       Laboratory and Diagnostics:  Results from last 7 days   Lab Units 02/15/22  2317 02/15/22  0612 02/14/22  0531 02/13/22  0534 02/12/22  1449 02/12/22  0430 02/11/22  1502 02/11/22  0641 02/11/22  0641 02/10/22  1414 02/10/22  0911 02/09/22  0913 02/09/22  0520   WBC Thousand/uL 13 52* 6 97 7 26 6 38  --  6 02  --   --  6 53  --  6 39   < > 5 50   HEMOGLOBIN g/dL 7 8* 7 4* 7 1* 7 1* 7 4* 7 4* 7 6*   < > 7 2*   < > 7 7*   < > 6 6*   HEMATOCRIT % 27 5* 25 0* 24 0* 23 2* 23 7* 24 4* 24 4*   < > 23 9*   < > 25 5*   < > 21 4*   PLATELETS Thousands/uL 115* 100* 146* 165  --  178  --   --  170  --  192   < > 178   NEUTROS PCT % 87* 80*  --  79*  --  76*  --   --  75  --  76*  --  76*   MONOS PCT % 4 9  --  8  --  10  --   --  9  --  9  --  10    < > = values in this interval not displayed       Results from last 7 days   Lab Units 02/15/22  2317 02/15/22  1821 02/15/22  1157 02/15/22  0612 02/15/22  0021 02/14/22  1803 02/14/22  1154 02/13/22  2300 02/13/22  0534 02/10/22  0636 02/09/22  0520   SODIUM mmol/L 131* 130* 129* 130* 130* 129* 128*   < > 128*   < > 129*   POTASSIUM mmol/L 4 1 4 1 3 7 4 1 3 8 4 1 3 8   < > 4 1   < > 3 2*   CHLORIDE mmol/L 97* 95* 94* 93* 93* 92* 90*   < > 87*   < > 90*   CO2 mmol/L 29 31 30 29 31 32 31   < > 32   < > 35*   ANION GAP mmol/L 5 4 5 8 6 5 7   < > 9   < > 4   BUN mg/dL 33* 35* 39* 45* 50* 55* 64*   < > 92*   < > 80*   CREATININE mg/dL 1 74* 1 70* 1 78* 2 03* 2 14* 2 33* 2 64*   < > 3 30*   < > 1 87*   CALCIUM mg/dL 9 7 9 0 8 9 9 0 9 2 9 3 8 8   < > 8 5   < > 8 0*   GLUCOSE RANDOM mg/dL 139 103 109 103 110 109 122   < > 122   < > 141*   ALT U/L 18  --  14  --   --   -- --   --  15  --  11*   AST U/L 30  --  27  --   --   --   --   --  23  --  22   ALK PHOS U/L 119*  --  125*  --   --   --   --   --  119*  --  143*   ALBUMIN g/dL 2 7*  --  2 4*  --   --   --   --   --  2 1*  --  1 9*   TOTAL BILIRUBIN mg/dL 2 21*  --  1 54*  --   --   --   --   --  0 93  --  0 82    < > = values in this interval not displayed  Results from last 7 days   Lab Units 02/15/22  2317 02/15/22  1821 02/15/22  1157 02/15/22  0612 02/15/22  0021 02/14/22  1803 02/14/22  1154 02/14/22  0531 02/14/22  0531   MAGNESIUM mg/dL 2 2 2 1 2 1 2 2 2 4 2 4 2 4   < > 2 5   PHOSPHORUS mg/dL  --  4 1 4 0 4 2* 4 5* 4 8* 5 7*  --  6 3*    < > = values in this interval not displayed  Results from last 7 days   Lab Units 02/14/22  0531 02/11/22  0846 02/10/22  0911   INR  1 22* 1 23* 1 53*          Results from last 7 days   Lab Units 02/15/22  2317   LACTIC ACID mmol/L 2 6*     ABG:  Results from last 7 days   Lab Units 02/15/22  2314   PH ART  7 124*   PCO2 ART mm Hg 92 5*   PO2 ART mm Hg 88 1   HCO3 ART mmol/L 29 7*   BASE EXC ART mmol/L -0 6   ABG SOURCE  Line, Arterial     VBG:  Results from last 7 days   Lab Units 02/15/22  2314   ABG SOURCE  Line, Arterial     Results from last 7 days   Lab Units 02/14/22  0531 02/13/22  2300 02/09/22  0520   PROCALCITONIN ng/ml 0 64* 0 79* 0 55*       Micro  Results from last 7 days   Lab Units 02/12/22  2027 02/10/22  1747   BLOOD CULTURE  No Growth at 72 hrs  No Growth at 72 hrs  --    MRSA CULTURE ONLY   --  Methicillin Resistant Staphylococcus aureus isolated*  This patient requires contact isolation precautions per [place] law  Contact precautions are not required in [place] for nasal surveillance cultures  Imaging: I have personally reviewed pertinent reports  Intake and Output  I/O       02/14 0701  02/15 0700 02/15 0701 02/16 0700    P  O  200 100    I V  (mL/kg) 66 (0 4) 94 7 (0 6)    IV Piggyback 1100 850    Total Intake(mL/kg) 1366 (8 2) 1044 7 (6 3)    Urine (mL/kg/hr) 45 (0) 20 (0)    Emesis/NG output  1752    Other 2648 1261    Stool  0    Total Output 2693 3033    Net -1327 -1988  3          Unmeasured Stool Occurrence  1 x    Unmeasured Emesis Occurrence  2 x          Height and Weights   Height: 5' 11" (180 3 cm)  IBW (Ideal Body Weight): 75 3 kg  Body mass index is 51 04 kg/m²  Weight (last 2 days)     Date/Time Weight    02/15/22 0600 166 (365 96)    02/14/22 0600 168 (370 37)            Nutrition       Diet Orders   (From admission, onward)             Start     Ordered    02/15/22 1525  Diet NPO  Diet effective now        References:    Nutrtion Support Algorithm Enteral vs  Parenteral   Question Answer Comment   Diet Type NPO    RD to adjust diet per protocol?  Yes        02/15/22 1525    02/14/22 1003  Dietary nutrition supplements  Once        Question Answer Comment   Select Supplement: Nepro-Vanilla    Frequency Lunch, Dinner        02/14/22 1002                  Active Medications  Scheduled Meds:  Current Facility-Administered Medications   Medication Dose Route Frequency Provider Last Rate    acetaminophen  650 mg Oral Q6H PRN Jacquiline Mew, CRNP      albumin human  25 g Intravenous Q6H [place] Ord, CRNP      albuterol  2 5 mg Nebulization Q6H PRN Jacquiline Mew, CRNP      calcium carbonate  500 mg Oral TID PRN Jacquiline Mew, CRNP      cefepime  2,000 mg Intravenous Q12H Eufemia Keith, ERNANP 2,000 mg (02/16/22 0053)    chlorhexidine  15 mL Mouth/Throat Q12H [address] EufemiaERNA CancinoNP      fentaNYL  50 mcg/hr Intravenous Continuous Thurlow CockayERNA manzoNP      heparin (porcine)  7,500 Units Subcutaneous [place]      levalbuterol  1 25 mg Nebulization TID Eufemia ERNA ParkerNP      lidocaine (PF)           midazolam  2 mg Intravenous Once Victorino ElectricERNANP      mupirocin   Nasal Q12H [address] Jacquiline Mew, CRNP      norepinephrine  1-30 mcg/min Intravenous Titrated ERNA LagunasNP     [id number] [address] QUIROGA 4/Ca 3  20,000 mL Dialysis Continuous Sami Horn MD      oxyCODONE  5 mg Oral Q4H PRN Veverly Arrant Sonday, CRCHACHO      pantoprazole  40 mg Intravenous Q24H [facility] Eufemiajono MunguiaTRES Olsen      phenylephine   mcg/min Intravenous Titrated TRES Lagunas      polyethylene glycol  17 g Oral BID Veverly Arrant Sonday, CRNP      propofol           senna-docusate sodium  2 tablet Oral BID TRES Romero      sodium chloride  4 mL Nebulization Q6H [facility] TRES Kee      sterile water           trimethobenzamide  200 mg Intramuscular Q6H PRN Ramses De Souza MD      vancomycin  15 mg/kg (Adjusted) Intravenous Q24H Alona Ann MD Stopped (02/15/22 1600)    vasopressin  0 04 Units/min Intravenous Continuous TRES Lagunas 0 04 Units/min (02/15/22 2323)    vecuronium  10 mg Intravenous Once TRES Lagunas       Continuous Infusions:  fentaNYL, 50 mcg/hr  norepinephrine, 1-30 mcg/min  NxStage K 4/Ca 3, 20,000 mL  phenylephine,  mcg/min  vasopressin, 0 04 Units/min, Last Rate: 0 04 Units/min (02/15/22 2323)      PRN Meds:   acetaminophen, 650 mg, Q6H PRN  albuterol, 2 5 mg, Q6H PRN  calcium carbonate, 500 mg, TID PRN  oxyCODONE, 5 mg, Q4H PRN  trimethobenzamide, 200 mg, Q6H PRN        Invasive Devices Review  Invasive Devices  Report    Peripheral Intravenous Line            Peripheral IV 02/13/22 Distal;Right;Upper;Ventral (anterior) Arm 2 days    Peripheral IV 02/13/22 Right Hand 2 days          Arterial Line            Arterial Line 02/16/22 Radial <1 day          Hemodialysis Catheter            HD Temporary Double Catheter 2 days          Drain            Urethral Catheter Other (Comment) 10 2 Fr  4 days          Airway            ETT  Cuffed;Oral 7 5 mm <1 day                Rationale for remaining devices: medically necessary  ---------------------------------------------------------------------------------------  Advance Directive and Living Will:      Power of Attorney:    POLST:    ---------------------------------------------------------------------------------------  Care Time Delivered:   No Critical Care time spent       [facility]      Portions of the record may have been created with voice recognition software  Occasional wrong word or "sound a like" substitutions may have occurred due to the inherent limitations of voice recognition software    Read the chart carefully and recognize, using context, where substitutions have occurred

## 2022-02-16 NOTE — ASSESSMENT & PLAN NOTE
Lab Results   Component Value Date    EGFR 40 02/15/2022    EGFR 41 02/15/2022    EGFR 39 02/15/2022    CREATININE 1 74 (H) 02/15/2022    CREATININE 1 70 (H) 02/15/2022    CREATININE 1 78 (H) 02/15/2022     · Patient admitted to [place] [address] due to mechanical fall and volume overload  · Patient on lasix 80 BID, zaroxolyn and midodrine prior to admission  · Nephrology was consulted and the patient was trialed on a lasix infusion which was transitioned to a bumex infusion  · After he failed to make progress with diuresis;  CVVH was started  · CVVH has been running with goal -75ml  · Blood was returned during his cardiac arrest & new filter was set up and reinitiated after ROSC   · Continue CVVH for now   · Nephrology following

## 2022-02-16 NOTE — PROGRESS NOTES
Critical Care Services- Interval Progress Note   Jennifer Cos 60 y o  male MRN: [id number]  Unit/Bed#: ICU 02 Encounter: [id number]  Assessment and Plan  Patient with new complaints of right abdominal pain with associated emesis earlier today  CT abd/pelvis was obtained  Tonight the patient had change in his mental status and hypoxia  CXR was obtained, nebs were ordered and chest PT was started  The patient then had increasing vasopressor requirements  An arterial line was placed  The patient was then unable to clear secretions and was lethargic  Decision was made to intubate him  Immediately prior to intubation, he started vomiting which appeared to be fecal in appearance  He was immediately suctioned  The ETT would not pass through his focal cords  He again started vomiting large amounts of fecal appearing matter  Another attempt at intubation was made and again could not pass through his vocal cords  ETT was downsized  Anesthesia was called to bedside  He again was vomiting  OG tube was placed to suction  The patient then lost his pulses and CPR was started immediately  He received 2 rounds of CPR, epi and bicarb  ROSC was obtained  He was successfully intubated with a 7 0 ETT   Diagnosis: Acute hypoxic respiratory failure  o Plan:   - Maintain ventilator support  - ABG Q 6 hour  - Respiratory protocol   - Continue antibiotics with the addition of Cefepime  - Continue nebs/ chest PT     Diagnosis: Fecal emesis  o Plan:   - Maintain OG to suction  - STAT read on CT abd obtained from earlier today  - NPO  - PRN Tigan      Diagnosis: Cardiac arrest   o Plan:   - Obtain STAT labs: ABG, lactic, CBC, CMP, mag  - Obtain ECHO  - Monitor neuro exam       Upon my evaluation, this patient had a high probability of imminent or life-threatening deterioration due to acute hypoxic respiratory failure and cardiac arrest, which required my direct attention, intervention, and personal management       I have personally provided 40 minutes (2250 to 2330) on 02/16/2022 of critical care time, exclusive of procedures, teaching, family meetings, and any prior time recorded by providers other than myself  Time includes review of laboratory data, review of imaging/radiology results, discussion with consultants, monitoring for potential decompensation  Interventions were performed as documented above    -------------------------------------------------------------------------------------------------------------------------------------  Laboratory   Results from last 7 days   Lab Units 02/15/22  0612 02/14/22  0531 02/13/22  0534 02/12/22  1449 02/12/22  0430 02/11/22  1502 02/11/22  0641 02/10/22  1414 02/10/22  0911 02/09/22  0913 02/09/22  0520   WBC Thousand/uL 6 97 7 26 6 38  --  6 02  --  6 53  --  6 39  --  5 50   HEMOGLOBIN g/dL 7 4* 7 1* 7 1* 7 4* 7 4* 7 6* 7 2*   < > 7 7*   < > 6 6*   HEMATOCRIT % 25 0* 24 0* 23 2* 23 7* 24 4* 24 4* 23 9*   < > 25 5*   < > 21 4*   PLATELETS Thousands/uL 100* 146* 165  --  178  --  170  --  192  --  178   NEUTROS PCT % 80*  --  79*  --  76*  --  75  --  76*  --  76*   MONOS PCT % 9  --  8  --  10  --  9  --  9  --  10    < > = values in this interval not displayed       Results from last 7 days   Lab Units 02/15/22  1821 02/15/22  1157 02/15/22  0612 02/15/22  0021 02/14/22  1803 02/14/22  1154 02/14/22  0531 02/13/22  2300 02/13/22  0534 02/10/22  0636 02/09/22  0520   SODIUM mmol/L 130* 129* 130* 130* 129* 128* 127*   < > 128*   < > 129*   POTASSIUM mmol/L 4 1 3 7 4 1 3 8 4 1 3 8 4 1   < > 4 1   < > 3 2*   CHLORIDE mmol/L 95* 94* 93* 93* 92* 90* 89*   < > 87*   < > 90*   CO2 mmol/L 31 30 29 31 32 31 31   < > 32   < > 35*   ANION GAP mmol/L 4 5 8 6 5 7 7   < > 9   < > 4   BUN mg/dL 35* 39* 45* 50* 55* 64* 78*   < > 92*   < > 80*   CREATININE mg/dL 1 70* 1 78* 2 03* 2 14* 2 33* 2 64* 2 88*   < > 3 30*   < > 1 87*   CALCIUM mg/dL 9 0 8 9 9 0 9 2 9 3 8 8 8 7   < > 8 5   < > 8 0*   GLUCOSE RANDOM mg/dL 103 109 103 110 109 122 122   < > 122   < > 141*   ALT U/L  --  14  --   --   --   --   --   --  15  --  11*   AST U/L  --  27  --   --   --   --   --   --  23 -- 22   ALK PHOS U/L  --  125*  --   --   --   --   --   --  119*  --  143*   ALBUMIN g/dL  --  2 4*  --   --   --   --   --   --  2 1*  --  1 9*   TOTAL BILIRUBIN mg/dL  --  1 54*  --   --   --   --   --   --  0 93  --  0 82    < > = values in this interval not displayed  Results from last 7 days   Lab Units 02/15/22  1821 02/15/22  1157 02/15/22  0612 02/15/22  0021 02/14/22  1803 02/14/22  1154 02/14/22  0531   MAGNESIUM mg/dL 2 1 2 1 2 2 2 4 2 4 2 4 2 5   PHOSPHORUS mg/dL 4 1 4 0 4 2* 4 5* 4 8* 5 7* 6 3*      Results from last 7 days   Lab Units 02/14/22  0531 02/11/22  0846 02/10/22  0911   INR  1 22* 1 23* 1 53*              ABG:  Results from last 7 days   Lab Units 02/15/22  2314   PH ART  7 124*   PCO2 ART mm Hg 92 5*   PO2 ART mm Hg 88 1   HCO3 ART mmol/L 29 7*   BASE EXC ART mmol/L -0 6   ABG SOURCE  Line, Arterial     VBG:  Results from last 7 days   Lab Units 02/15/22  2314   ABG SOURCE  Line, Arterial     Results from last 7 days   Lab Units 02/14/22  0531 02/13/22  2300 02/09/22  0520   PROCALCITONIN ng/ml 0 64* 0 79* 0 55*       Micro  Results from last 7 days   Lab Units 02/12/22  2027 02/10/22  1747   BLOOD CULTURE  No Growth at 48 hrs  No Growth at 48 hrs  --    MRSA CULTURE ONLY   --  Methicillin Resistant Staphylococcus aureus isolated*  This patient requires contact isolation precautions per [place] law  Contact precautions are not required in [place] for nasal surveillance cultures  Diagnostic Imaging / Data: I have personally reviewed pertinent reports        Medications:  Current Facility-Administered Medications   Medication Dose Route Frequency    acetaminophen (TYLENOL) tablet 650 mg  650 mg Oral Q6H PRN    albumin human (FLEXBUMIN) 25 % injection 25 g  25 g Intravenous Q6H    albuterol inhalation solution 2 5 mg  2 5 mg Nebulization Q6H PRN    calcium carbonate (TUMS) chewable tablet 500 mg  500 mg Oral TID PRN    cefepime (MAXIPIME) 2,000 mg in dextrose 5 % 50 mL IVPB  2,000 mg Intravenous Q12H    chlorhexidine (PERIDEX) 0 12 % oral rinse 15 mL  15 mL Mouth/Throat K73I GERRY    folic acid (FOLVITE) tablet 1 mg  1 mg Oral Daily    heparin (porcine) subcutaneous injection 7,500 Units  7,500 Units Subcutaneous Q8H [address]    levalbuterol (XOPENEX) inhalation solution 1 25 mg  1 25 mg Nebulization TID    lidocaine (PF) (XYLOCAINE-MPF) 1 % injection **ADS Override Pull**        metoprolol succinate (TOPROL-XL) 24 hr tablet 25 mg  25 mg Oral Q12H GERRY    midodrine (PROAMATINE) tablet 15 mg  15 mg Oral TID AC    mupirocin (BACTROBAN) 2 % nasal ointment   Nasal Q12H [address]    NOREPINEPHRINE 4 MG  ML NSS (CMPD ORDER) infusion  1-30 mcg/min Intravenous Titrated    NxStage K 4/Ca 3 dialysis solution (RFP-401) 20,000 mL  20,000 mL Dialysis Continuous    oxyCODONE (ROXICODONE) IR tablet 5 mg  5 mg Oral Q4H PRN    pantoprazole (PROTONIX) EC tablet 40 mg  40 mg Oral Daily Before Breakfast    polyethylene glycol (MIRALAX) packet 17 g  17 g Oral BID    senna-docusate sodium (SENOKOT S) 8 6-50 mg per tablet 2 tablet  2 tablet Oral BID    sodium chloride 3 % inhalation solution 4 mL  4 mL Nebulization Q6H    thiamine tablet 100 mg  100 mg Oral Daily    trimethobenzamide (TIGAN) IM injection 200 mg  200 mg Intramuscular Q6H PRN    vancomycin (VANCOCIN) 1,750 mg in sodium chloride 0 9 % 500 mL IVPB  15 mg/kg (Adjusted) Intravenous Q24H    vasopressin (PITRESSIN) 20 Units in sodium chloride 0 9 % 100 mL infusion  0 04 Units/min Intravenous Continuous       SIGNATURE: TRES Portillo    Portions of the record may have been created with voice recognition software  Occasional wrong word or "sound a like" substitutions may have occurred due to the inherent limitations of voice recognition software    Read the chart carefully and recognize, using context, where substitutions have occurred

## 2022-02-16 NOTE — ASSESSMENT & PLAN NOTE
· Patient had increasing midodrine   · Now d/c'd secondary to ileus   · Continue vasopressor support   · Maintain MAP > 65

## 2022-02-16 NOTE — QUICK NOTE
Updated wife by phone  Extensive discussion on current status  Informed of CT scan for nausea and vomiting small amounts of bile  Discussed possibility of worsening gallstones  Discussed plan for possible NGT placement after CT scan results returned

## 2022-02-16 NOTE — ASSESSMENT & PLAN NOTE
· Outpatient regimen consists of: metoprolol + coumadin   · Coumadin held due to abdominal wall hematoma  · Midodrine increased on 2/15  · Hold metoprolol for now given hypotension

## 2022-02-16 NOTE — ASSESSMENT & PLAN NOTE
· CT imaging on 2/8 revealed right lower lobe airspace opacities, concerning for pneumonia  · Procalcitonin 0 79 > 0 64  · MRSA positive sputum  · Sputum culture positive for staph aureus   · Continue Vancomycin, Day # 4/7

## 2022-02-16 NOTE — ASSESSMENT & PLAN NOTE
Wt Readings from Last 3 Encounters:   02/15/22 (!) 166 kg (365 lb 15 4 oz)   02/12/22 (!) 170 kg (374 lb 12 5 oz)   12/29/21 (!) 152 kg (336 lb)     · Patient noted to be volume overload with a increase in his weight of 20 kg since December and anasarca  · Patient on room air with no respiratory distress  · Continue CVVH, running negative as outlined   · Goal will be to maintain a negative fluid balance as able

## 2022-02-16 NOTE — ASSESSMENT & PLAN NOTE
· Due to abdominal wall hematoma, chronic anticoagulation  · s/p 3 units PRBC, last transfusion on 2/11  · Surgery following up at [facility] and has signed off - no intervention at this time

## 2022-02-16 NOTE — RESPIRATORY THERAPY NOTE
02/16/22 1320   Vent Information   Vent type     Vent Mode AC/VC   SpO2 (!) 89 %   AC/VC Settings   Resp Rate (BPM) 38 BPM   Vt (mL) 400 mL   FIO2 (%) 100 %   PEEP (cmH2O) 10 cmH2O   Flow Pattern (LPM) 60 L/min   Trigger Sensitivity Flow (lpm) 2 %   AC/VC Actuals   Resp Rate (BPM) 38 BPM   VT (mL) 431   MV 16 3   MAP (cmH2O) 32 cmH2O   Peak Pressure (cmH2O) 61 cmH2O   I/E Ratio (Obs) 1:1 2   Plateau Pressure (cm H2O) 52 cm H2O

## 2022-02-16 NOTE — ASSESSMENT & PLAN NOTE
· Patient with increasing oxygen requirements on 2/15 after episodes of vomiting  · He then became increasingly lethargic and unable to clear secretions  · S/p intubation on 2/15 with several aspiration occurrences; as patient was continuously vomiting   · Continue Vanco and Cefepime   · Scheduled nebs added  · Follow up with frequent ABG's  · Patient will likely need a bronch; requiring frequent suctioning  · Maintain sp02 > 88%

## 2022-02-16 NOTE — PROGRESS NOTES
NEPHROLOGY PROGRESS NOTE   Gorge Abdalla 60 y o  male MRN: [id number]  Unit/Bed#: ICU 02 Encounter: [id number]  Reason for Consult: ARF      SUMMARY:    58 yo man with PMH of CKD G5, CHF, anemia, lymphedema, STEFF, cirrhosis, AFib presents to [facility] after mechanical fall, found to be fluid overload  Nephrology was consulted for MARISABEL and volume overload  Patient transferred to [facility] for further management  Patient had cardiac arrest on February 15  ASSESSMENT and PLAN:    Acute renal failure/acute tubular necrosis  --presumed to be secondary to cardiorenal syndrome with underlying acute tubular necrosis and hypotension and contrast associated nephropathy with underlying chronic kidney disease  --anuric, Gann catheter in place --> no objections discontinue the Gann catheter, his urine output at this point remains poor  --patient started on renal replacement therapy due to worsening renal function volume overload with poor response to diuretic  --patient's clinical status worsened in the last 24 hours, underwent cardiac arrest, now maintained on 4 pressors, 100% FiO2 on the ventilator  --started CVVH on  2/13/22, CVVH currently off since 11:00 p m   February 15th, planning to restart, and running even to start    Cardiac arrest/PEA  --February 15th  --received 2 rounds of CPR, epinephrine, and bicarbonate --> ROSC  --ventilatory dependent respiratory failure, currently intubated on 100% FiO2 with a concern of ARDS  --currently maintained on 4 pressors including phenylephrine norepinephrine, vasopressin, and epinephrine  --planning to restart CVVH  --overall prognosis is guarded    Respiratory acidosis  --intubated, management as per critical care  --concern for ARDS  --pH 7 204     Anemia  - low but stable  - transfuse if hemoglobin less than 7  -history of an abdominal wall hematoma closely monitor     Hyponatremia  - volume mediated  - will plan to restart CVVH and monitor sodium level  - stable on CVVH     Blood pressure/volume status  - hypotension, with underlying cardiorenal syndrome  - now currently on 4 pressors, epinephrine, norepinephrine, phenylephrine, and vasopressin to maintain the blood pressure  -planning to restart CVVH, full initially run even, and if blood pressure tolerates to her few hours we can attempt for ultrafiltration  -midodrine 10 mg 3 times a day, midodrine increased to 15 mg  -colloid IV albumin also added  -metoprolol 25 mg twice a day     Hyperphosphatemia  --trending down  --monitor with CVVH     Chronic kidney disease stage IIIB  --appears to have had some underlying progression of disease  --prior baseline creatinine 1 6-1 9 mg/dL  --outpatient nephrologist Dr Arthur     Urinary retention  --Gann catheter in place  --if the urine output becomes nonsignificant or even absent can discontinue Gann catheter     History of trauma to the abdominal wall resulting in hematoma  --status post IR pelvic angiogram on 12/4/2021 status post embolization  --recent CT scan reports mild increase in the size hematoma     Acute on chronic diastolic congestive heart failure  --with dilated cardiomyopathy and cirrhosis  --poor response to diuretic  --poor urine output  --now on CVVH      SUBJECTIVE / INTERVAL HISTORY:    Unfortunately this patient underwent cardiac arrest last [birth year] on [birth year]  Code lasted approximately 12 minutes  Was able to get a pulse back  Was intubated and currently on 4 pressors  CVVH was stopped last night at 11:00 p m   When he underwent cardiac arrest    OBJECTIVE:  Current Weight: Weight - Scale: (!) 166 kg (365 lb 15 4 oz)  Vitals:    02/16/22 0530 02/16/22 0600 02/16/22 0700 02/16/22 0854   BP:       Pulse: (!) 106 (!) 120 (!) 120    Resp: (!) 0 (!) 29 (!) 28    Temp:   (!) 96 5 °F (35 8 °C)    TempSrc:   Temporal    SpO2: (!) 83% 97% 93% 97%   Weight:       Height:           Intake/Output Summary (Last 24 hours) at 2/16/2022 2751  Last data filed at 2/16/2022 0306  Gross per 24 hour   Intake 1274 93 ml   Output 3080 ml   Net -1805 07 ml       Review of Systems:    Unable to obtain    Physical Exam  Vitals and nursing note reviewed  Exam conducted with a chaperone present  Constitutional:       Appearance: He is obese  He is ill-appearing  Comments: Intubated and sedated   HENT:      Head: Normocephalic and atraumatic  Mouth/Throat:      Mouth: Mucous membranes are moist    Eyes:      General: No scleral icterus  Right eye: No discharge  Left eye: No discharge  Neck:      Comments: + JVD  Cardiovascular:      Rate and Rhythm: Tachycardia present  Pulses: Normal pulses  Pulmonary:      Comments: Coarse breath sounds bilaterally with rales, currently intubated  Abdominal:      General: There is no distension  Tenderness: There is no abdominal tenderness  There is no guarding  Genitourinary:     Comments: Gann catheter in place with minimal dark urine  Musculoskeletal:         General: Swelling present  Cervical back: Neck supple  Right lower leg: Edema present  Left lower leg: Edema present  Skin:     General: Skin is dry  Coloration: Skin is pale  Findings: No erythema or rash     Neurological:      Comments: Sedated on the ventilator         Medications:    Current Facility-Administered Medications:     acetaminophen (TYLENOL) tablet 650 mg, 650 mg, Oral, Q6H PRN, Beau Flakes, CRNP    albuterol inhalation solution 2 5 mg, 2 5 mg, Nebulization, Q6H PRN, Beau Flakes, CRNP, 2 5 mg at 02/16/22 0404    cisatracurium (NIMBEX) 200 mg in sodium chloride 0 9 % 500 mL infusion, 0 1-5 mcg/kg/min, Intravenous, Titrated, Last Rate: 5 mL/hr at 02/16/22 0824, 0 2 mcg/kg/min at 02/16/22 0824 **AND** artificial tear (LUBRIFRESH P M ) ophthalmic ointment, , Both Eyes, Q2H, Karissa Hamper, CRNP    calcium carbonate (TUMS) chewable tablet 500 mg, 500 mg, Oral, TID PRN, Beau Flakes, CRNP, 500 mg at 02/12/22 2119    cefepime (MAXIPIME) 2,000 mg in dextrose 5 % 50 mL IVPB, 2,000 mg, Intravenous, Q12H, TRES Lagunas, Last Rate: 100 mL/hr at 02/16/22 0053, 2,000 mg at 02/16/22 0053    chlorhexidine (PERIDEX) 0 12 % oral rinse 15 mL, 15 mL, Mouth/Throat, Q12H [address], TRES Lagunas, 15 mL at 02/16/22 0930    EPINEPHrine 6000 mcg (DOUBLE CONCENTRATION) IV in sodium chloride 0 9% 250 mL, 1-10 mcg/min, Intravenous, Titrated, TRES Laugnas, Last Rate: 25 mL/hr at 02/16/22 0354, 10 mcg/min at 02/16/22 0354    epoprostenol (VELETRI) 30,000 ng/mL in sodium chloride 0 9 % 50 mL inhalation solution, 6 25-50 ng/kg/min (Ideal), Inhalation, Titrated, TRES Lagunas, Last Rate: 7 5 mL/hr at 02/16/22 0404, 50 ng/kg/min at 02/16/22 0404    fentaNYL 1000 mcg in sodium chloride 0 9% 100mL infusion, 50 mcg/hr, Intravenous, Continuous, TRES Peng, Last Rate: 5 mL/hr at 02/16/22 0931, 50 mcg/hr at 02/16/22 0931    fentanyl citrate (PF) 100 MCG/2ML **ADS Override Pull**, , , ,     heparin (porcine) subcutaneous injection 7,500 Units, 7,500 Units, Subcutaneous, Q8H [address], Naima Pruitt PA-C, 7,500 Units at 02/16/22 0554    hydrocortisone sodium succinate (PF) (Solu-CORTEF) injection 50 mg, 50 mg, Intravenous, Q6H [address], TRES Lagunas, 50 mg at 02/16/22 0554    levalbuterol (XOPENEX) inhalation solution 1 25 mg, 1 25 mg, Nebulization, TID, TRES Lagunas, 1 25 mg at 02/15/22 2312    lidocaine (PF) (XYLOCAINE-MPF) 1 % injection **ADS Override Pull**, , , ,     midazolam (VERSED) injection 2 mg, 2 mg, Intravenous, Once, TRES Lagunas    mupirocin (BACTROBAN) 2 % nasal ointment, , Nasal, Q12H [address], Ronny Walter, ERNANP, 1 application at 24/30/34 0932    norepinephrine (LEVOPHED) 8 mg (DOUBLE CONCENTRATION) IV in sodium chloride 0 9% 250 mL, 1-30 mcg/min, Intravenous, Titrated, TRES Lagunas, Last Rate: 112 5 mL/hr at 02/16/22 0831, 60 mcg/min at 02/16/22 0831    NxStage K 4/Ca 3 dialysis solution (RFP-401) 20,000 mL, 20,000 mL, Dialysis, Continuous, Vijay Phillips MD, 20,000 mL at 02/15/22 1949    oxyCODONE (ROXICODONE) IR tablet 5 mg, 5 mg, Oral, Q4H PRN, Aguilar Aung Hawkins, ERNANP, 5 mg at 02/15/22 1846    pantoprazole (PROTONIX) injection 40 mg, 40 mg, Intravenous, Q24H [address], TRES Lagunas, 40 mg at 02/16/22 0930    phenylephrine (JAYSON-SYNEPHRINE) 100 mg (DOUBLE CONCENTRATION) IV in sodium chloride 0 9% 250 mL,  mcg/min, Intravenous, Titrated, TRES Lagunas, Last Rate: 27 mL/hr at 02/16/22 0247, 180 mcg/min at 02/16/22 0247    polyethylene glycol (MIRALAX) packet 17 g, 17 g, Oral, BID, ERNA AbbasiNP, 17 g at 02/16/22 0930    propofol (DIPRIVAN) 1000 mg in 100 mL infusion (premix), 5-50 mcg/kg/min, Intravenous, Titrated, TRES Lagunas, Last Rate: 19 9 mL/hr at 02/16/22 0715, 20 mcg/kg/min at 02/16/22 0715    senna-docusate sodium (SENOKOT S) 8 6-50 mg per tablet 2 tablet, 2 tablet, Oral, BID, TRES Soni, 2 tablet at 02/16/22 0930    sodium chloride 3 % inhalation solution 4 mL, 4 mL, Nebulization, Q6H, TRES Lagunas, 4 mL at 02/16/22 0215    trimethobenzamide (TIGAN) IM injection 200 mg, 200 mg, Intramuscular, Q6H PRN, Sacha Cordoba MD, 200 mg at 02/15/22 1615    vancomycin (VANCOCIN) 1,750 mg in sodium chloride 0 9 % 500 mL IVPB, 15 mg/kg (Adjusted), Intravenous, Q24H, Hien Murrieta MD, Stopped at 02/15/22 1600    vasopressin (PITRESSIN) 20 Units in sodium chloride 0 9 % 100 mL infusion, 0 04 Units/min, Intravenous, Continuous, TRES Lagunas, Last Rate: 12 mL/hr at 02/16/22 0827, 0 04 Units/min at 02/16/22 0827    Laboratory Results:  Results from last 7 days   Lab Units 02/16/22  0710 02/16/22  0236 02/15/22  2317 02/15/22  1821 02/15/22  1157 02/15/22  0612 02/15/22  0021 02/14/22  1803 02/14/22  1803 02/14/22  1154 02/14/22  0531 02/13/22  2300 02/13/22  0534 02/12/22  1449 02/12/22  0430 02/12/22  0430 02/11/22  1502 02/11/22  0641 02/11/22  0641 02/10/22  1414 02/10/22  0911   WBC Thousand/uL  --   --  13 52*  --   --  6 97  --   --   --   --  7 26  --  6 38  --   --  6 02  --   --  6 53  --  6 39   HEMOGLOBIN g/dL  --   --  7 8*  --   --  7 4*  --   --   --   --  7 1*  --  7 1* 7 4*  --  7 4* 7 6*   < > 7 2*   < > 7 7*   HEMATOCRIT %  --   --  27 5*  --   --  25 0*  --   --   --   --  24 0*  --  23 2* 23 7*  --  24 4* 24 4*   < > 23 9*   < > 25 5*   PLATELETS Thousands/uL  --   --  115*  --   --  100*  --   --   --   --  146*  --  165  --   --  178  --   --  170  --  192   POTASSIUM mmol/L 3 4* 3 7 4 1 4 1 3 7 4 1 3 8   < > 4 1   < > 4 1   < > 4 1  --    < > 3 8  --    < > 3 9  --   --    CHLORIDE mmol/L 97* 98* 97* 95* 94* 93* 93*   < > 92*   < > 89*   < > 87*  --    < > 89*  --    < > 87*  --   --    CO2 mmol/L 27 31 29 31 30 29 31   < > 32   < > 31   < > 32  --    < > 33*  --    < > 34*  --   --    BUN mg/dL 37* 35* 33* 35* 39* 45* 50*   < > 55*   < > 78*   < > 92*  --    < > 89*  --    < > 90*  --   --    CREATININE mg/dL 1 94* 1 85* 1 74* 1 70* 1 78* 2 03* 2 14*   < > 2 33*   < > 2 88*   < > 3 30*  --    < > 3 26*  --    < > 3 21*  --   --    CALCIUM mg/dL 8 6 9 2 9 7 9 0 8 9 9 0 9 2   < > 9 3   < > 8 7   < > 8 5  --    < > 8 4  --    < > 8 2*  --   --    MAGNESIUM mg/dL 2 1 2 1 2 2 2 1 2 1 2 2 2 4   < > 2 4   < > 2 5   < > 2 6  --   --   --   --   --   --   --   --    PHOSPHORUS mg/dL 4 4* 4 7*  --  4 1 4 0 4 2* 4 5*  --  4 8*   < > 6 3*   < >  --   --   --   --   --   --   --   --   --     < > = values in this interval not displayed

## 2022-02-16 NOTE — PROGRESS NOTES
Vancomycin IV Pharmacy-to-Dose Consultation    Jacquelyn Pal is a 57 y o  male who is currently receiving Vancomycin IV with management by the Pharmacy Consult service  Assessment/Plan:  The patient was reviewed  No signs or symptoms of nephrotoxicity and/or infusion reactions were documented in the chart  Remains on CRRT per nephrology recommendations  CRRT was stopped due to hemodynamic instability for about 12 hours  Trough checked at 1222 is 18 5  Potential Nephrotoxicity Factors:  Medications: vasopressors  Patient Factors: hypotension, renal dysfunction, elderly    Based on todays assessment, continue current vancomycin (day # 4) dosing of 1,750 mg q24h  No further levels will be drawn, as therapy is to be discontinued on 2/18, unless there are changes with CRRT  We will continue to follow the patients culture results and clinical progress daily      Jermain Amaya, PharmD, [facility] Clinical Pharmacist  [phone] or via [email or web address]

## 2022-02-16 NOTE — PROGRESS NOTES
Brief note    I was called overnight due to patient's difficult oxygenation, SpO2 in the 50s with a low tidal volume in the 200s (ibw 75 kg)  Patient had a severe emesis/fecal or bilious contents earlier today followed by aspiration pneumonia required intubation/mechanical ventilation  Difficult to intubate, complicated by PEA arrest needed 2 rounds of CPR AP/bicarb  I performed bronchoscopy at bedside, cleaned excessive secretions frothy/greenish from emesis with worsening hypoxemia during the procedure SpO2 down to the 40s  I then performed BVM ventilation, noted significant improvement of oxygenation with higher tidal volume, was difficult to bag  After obtaining adequate oxygenation, repeated bronchoscopy times to clean further secretions  Vent changes made to include peep of 10 from 6, TV up to 500 with easier bagging after the bronchoscopy  Also patient was paralyzed with vecuronium to reduce further chest wall low compliance  This is despite peak pressure was up to 70s, suspect it should be lower within hours with further recruitments  Patient is likely in ARDS, on a background morbid obesity BMI of 51/severe abdominal wall edema and low chest volumes that would likely require a high peak pressure to push the chest wall out for adequate alveolar ventilation  For now, continue current vent settings, on maximal vasopressor support, start neuromuscular blockers/Nimbex drip  I personally updated the wife Alicia Santana, about poor prognosis if he were to deteriorate despite current measures      Total critical care time spent 70 mins

## 2022-02-16 NOTE — OCCUPATIONAL THERAPY NOTE
Occupational Therapy         Patient Name: Sally Lara  YMDZL'C Date: 2/16/2022      Pt s/p cardiac arrest on 2/15  Pt now sedated & intubated  Poor medical prognosis per chart  Pt not appropriate for OT interventions at this time hence will discontinue OT for now  Please reconsult OT when pt medically appropriate   Jeremy Leone OT

## 2022-02-16 NOTE — PLAN OF CARE
Problem: METABOLIC, FLUID AND ELECTROLYTES - ADULT  Goal: Electrolytes maintained within normal limits  Description: INTERVENTIONS:  - Monitor labs and assess patient for signs and symptoms of electrolyte imbalances  - Administer electrolyte replacement as ordered  - Monitor response to electrolyte replacements, including repeat lab results as appropriate  - Instruct patient on fluid and nutrition as appropriate  2/16/2022 1149 by Daisy Osborne RN  Outcome: Progressing  2/16/2022 1148 by Daisy Osborne RN  Outcome: Progressing     Problem: METABOLIC, FLUID AND ELECTROLYTES - ADULT  Goal: Fluid balance maintained  Description: INTERVENTIONS:  - Monitor labs   - Monitor I/O and WT  - Instruct patient on fluid and nutrition as appropriate  - Assess for signs & symptoms of volume excess or deficit  2/16/2022 1149 by Daisy Osborne RN  Outcome: Progressing  2/16/2022 1148 by Daisy Osborne RN  Outcome: Progressing     Problem: METABOLIC, FLUID AND ELECTROLYTES - ADULT  Goal: Glucose maintained within target range  Description: INTERVENTIONS:  - Monitor Blood Glucose as ordered  - Assess for signs and symptoms of hyperglycemia and hypoglycemia  - Administer ordered medications to maintain glucose within target range  - Assess nutritional intake and initiate nutrition service referral as needed  2/16/2022 1149 by Daisy Osborne RN  Outcome: Progressing  2/16/2022 1148 by Daisy Osborne RN  Outcome: Progressing     Problem: INFECTION - ADULT  Goal: Absence or prevention of progression during hospitalization  Description: INTERVENTIONS:  - Assess and monitor for signs and symptoms of infection  - Monitor lab/diagnostic results  - Monitor all insertion sites, i e  indwelling lines, tubes, and drains  - Monitor endotracheal if appropriate and nasal secretions for changes in amount and color  - Weare appropriate cooling/warming therapies per order  - Administer medications as ordered  - Instruct and encourage patient and family to use good hand hygiene technique  - Identify and instruct in appropriate isolation precautions for identified infection/condition  2/16/2022 1149 by Martinez Lira RN  Outcome: Progressing  2/16/2022 1148 by Martinez Lira RN  Outcome: Progressing     Problem: RESPIRATORY - ADULT  Goal: Achieves optimal ventilation and oxygenation  Description: INTERVENTIONS:  - Assess for changes in respiratory status  - Assess for changes in mentation and behavior  - Position to facilitate oxygenation and minimize respiratory effort  - Oxygen administered by appropriate delivery if ordered  - Initiate smoking cessation education as indicated  - Encourage broncho-pulmonary hygiene including cough, deep breathe, Incentive Spirometry  - Assess the need for suctioning and aspirate as needed  - Assess and instruct to report SOB or any respiratory difficulty  - Respiratory Therapy support as indicated  2/16/2022 1149 by Martinez Lira RN  Outcome: Progressing  2/16/2022 1148 by Martinez Lira RN  Outcome: Progressing     Problem: CARDIOVASCULAR - ADULT  Goal: Maintains optimal cardiac output and hemodynamic stability  Description: INTERVENTIONS:  - Monitor I/O, vital signs and rhythm  - Monitor for S/S and trends of decreased cardiac output  - Administer and titrate ordered vasoactive medications to optimize hemodynamic stability  - Assess quality of pulses, skin color and temperature  - Assess for signs of decreased coronary artery perfusion  - Instruct patient to report change in severity of symptoms  2/16/2022 1149 by Martinez Lira RN  Outcome: Progressing  2/16/2022 1148 by Martinez Lira RN  Outcome: Progressing     Problem: CARDIOVASCULAR - ADULT  Goal: Absence of cardiac dysrhythmias or at baseline rhythm  Description: INTERVENTIONS:  - Continuous cardiac monitoring, vital signs, obtain 12 lead EKG if ordered  - Administer antiarrhythmic and heart rate control medications as ordered  - Monitor electrolytes and administer replacement therapy as ordered  2/16/2022 1149 by Sarah Farris RN  Outcome: Progressing  2/16/2022 1148 by Sarah Farris RN  Outcome: Progressing     Problem: SAFETY,RESTRAINT: NV/NON-SELF DESTRUCTIVE BEHAVIOR  Goal: Remains free of harm/injury (restraint for non violent/non self-detsructive behavior)  Description: INTERVENTIONS:  - Instruct patient/family regarding restraint use   - Assess and monitor physiologic and psychological status   - Provide interventions and comfort measures to meet assessed patient needs   - Identify and implement measures to help patient regain control  - Assess readiness for release of restraint   Outcome: Progressing     Problem: SAFETY,RESTRAINT: NV/NON-SELF DESTRUCTIVE BEHAVIOR  Goal: Returns to optimal restraint-free functioning  Description: INTERVENTIONS:  - Assess the patient's behavior and symptoms that indicate continued need for restraint  - Identify and implement measures to help patient regain control  - Assess readiness for release of restraint   Outcome: Progressing

## 2022-02-16 NOTE — ASSESSMENT & PLAN NOTE
· Patient with new nausea and vomiting on 2/15; appeared to be bilious at that time  · He was medicated with Zofran which was transitioned to Tigan secondary to prolonged Qtc   · Patient then developed fecal emesis   · Continue OG to suction; is now bilious   · CT abd/chest obtained, follow up on final read

## 2022-02-16 NOTE — ACP (ADVANCE CARE PLANNING)
I had meeting with wife Lj Grant in the presence of friend Heidy Denise, and I in for wife about the patient's condition and events since yesterday including his abdominal pain and CT scan then followed by worsening respiratory status and mental status requiring intubation during which she had cardiac arrest for 12 minutes and massive aspiration  His current condition with severe septic shock and severe respiratory failure requiring significant mechanical ventilation support and maximal vasopressor support  We discussed further management and I offered her options of continuous care as currently versus comfort care and withdrawal of care  Also I discussed with her resuscitation status and she agreed to make patient DNR  She verbalized understanding of the critical care status of her , but was not able to make a decision at this point, she is waiting for his sisters to arrive later today, one of them is an RN, and she is hoping to help her with making a decision later this evening/tonight  I answered all the questions to satisfaction      Added critical care time 15 min

## 2022-02-16 NOTE — QUICK NOTE
Patients wife called back and was updated extensively on events that occurred   I strongly encouraged her to come to the hospital  She stated understanding and would make a phone call to her friend to help drive her to the hospital

## 2022-02-16 NOTE — PROGRESS NOTES
Pastoral Care Progress Note    2022  Patient: Janie Remy : 1960  Admission Date & Time: 2022 1851  MRN: [id number] [facility]: [id number]      Chaplaincy Outcomes Achieved:  Arranged for community clergy surrogate

## 2022-02-16 NOTE — PROGRESS NOTES
Pastoral Care Progress Note    2022  Patient: Antonella Mayer : 1960  Admission Date & Time: 2022 1851  MRN: [id number] CSN: [id number]      Talked with nursing staff for an update on how patient was doing, wife was called in for patient who was not doing well  Other family here to visited provided listening and pastoral care presence  Will continue to follow patient and support family

## 2022-02-16 NOTE — PHYSICAL THERAPY NOTE
Physical Therapy Cancellation Note    Pt s/p cardiac arrest on 2/15  Pt now sedated & intubated  Poor medical prognosis per chart  Pt not appropriate for PT interventions at this time hence will discontinue PT for now  Please reconsult PT when pt medically appropriate   Mark Diallo, PT

## 2022-02-16 NOTE — PROGRESS NOTES
Patient continued to complain of right-sided abdominal pain and mild nausea with small amount of emesis that appeared to be bile-like  He was responding partially to Zofran  Abdominal exam was not helpful, significant abdominal wall edema but abdomen was soft generally  I attempted to do abdominal ultrasound but was not able to have any visualization of abdominal organ/liver and biliary tract due to severe abdominal wall edema and thickness  Decided to order CT scan of the abdomen and pelvis to evaluate for any possible gallbladder disease, and evaluate for any worsening of his abdominal wall hematoma

## 2022-02-17 NOTE — ASSESSMENT & PLAN NOTE
· POA  · CT imaging on 2/8 revealed right lower lobe airspace opacities, concerning for pneumonia  · MRSA positive sputum  · Sputum culture positive for staph aureus   · Continue Vancomycin, Day # 5/7

## 2022-02-17 NOTE — ASSESSMENT & PLAN NOTE
· Outpatient regimen consists of: metoprolol + coumadin   · Coumadin held due to abdominal wall hematoma  · Rates into 170's on 2/16 requiring Amio bolus x 2 and then initiated on continuous infusion  · Continue for now  · Remains on 4 pressors

## 2022-02-17 NOTE — QUICK NOTE
Family updated at bedside by Dr Jocelynn Mcmahon and myself  Family verbalizes understanding of current goals of care  No questions at this time

## 2022-02-17 NOTE — ASSESSMENT & PLAN NOTE
· Now with leukocytosis, hypothermia, lactic acidosis, hypotension, tachycardia  · Continue Vanco, day # 5  · Continue Cefepime, day # 2   · ECHO revealed EF of 43%, mild to moderate global hypokinesis and pulmonary artery systolic pressure of 54 mmhg   · Continue to trend lactic   · Continue vasopressor support  · Currently requiring Levophed, vasopressin, subha and epi

## 2022-02-17 NOTE — ASSESSMENT & PLAN NOTE
· Due to abdominal wall hematoma, chronic anticoagulation  · s/p 3 units PRBC, last transfusion on 2/11

## 2022-02-17 NOTE — ASSESSMENT & PLAN NOTE
· Patient PEA cardiac arrest on 2/15  · Etiology secondary to aspiration hypoxia   · Received 2 rounds of CPR, Epi and bicarb and obtained ROSC and was intubated   · Continue vasopressor support to maintain MAP > 65

## 2022-02-17 NOTE — ASSESSMENT & PLAN NOTE
Lab Results   Component Value Date    EGFR 38 02/16/2022    EGFR 38 02/16/2022    EGFR 39 02/16/2022    CREATININE 1 83 (H) 02/16/2022    CREATININE 1 82 (H) 02/16/2022    CREATININE 1 77 (H) 02/16/2022     · Patient admitted to [place] [id number] due to mechanical fall and volume overload  · Patient on lasix 80 BID, zaroxolyn and midodrine prior to admission  · Nephrology was consulted and the patient was trialed on a lasix infusion which was transitioned to a bumex infusion  · After he failed to make progress with diuresis;  CVVH was started  · Continue CVVH running even

## 2022-02-17 NOTE — PROCEDURES
NEPHROLOGY DIALYSIS PROCEDURE NOTE      Patient seen and examined on CVVH, tolerating procedure well  All documentation, labs, medications were reviewed by myself, and the treatment plan was reviewed with nurse and patient  Seen on Dialysis at : 10:05 AM  Dialysis Access: Right IJ non tunneled dialysis catheter  Vitals: 76/44, heart rate 140, saturating 99% on the ventilator  Events:  has been running net -300 mL/hour since last night, blood pressure has been low but stable with some improvement at times with increased ultrafiltration  CVVH: Therapy Fluid: 3 5 L/hr; Ultrafiltration: net negative goal 300 mL/hr ; Blood Flow Rate: 250 mL/min; Dialysate: 2K/3Ca       SUMMARY:     58 yo man with PMH of CKD G5, CHF, anemia, lymphedema, STEFF, cirrhosis, AFib presents to [facility] after mechanical fall, found to be fluid overload   Nephrology was consulted for MARISABEL and volume overload   Patient transferred to [facility] for further management      Patient had cardiac arrest on February 15      Assessment/Plan:    Acute renal failure/acute tubular necrosis  --presumed to be secondary to cardiorenal syndrome with underlying acute tubular necrosis and hypotension and contrast associated nephropathy with underlying chronic kidney disease  --anuric, Gann catheter in place --> no objections discontinue the Gann catheter, his urine output at this point remains poor  --patient started on renal replacement therapy due to worsening renal function volume overload with poor response to diuretic  --remains intubated and for pressors for blood pressure support  --continue CVVH, continue ultrafiltration as blood pressure tolerates     Cardiac arrest/PEA  --February 15th  --received 2 rounds of CPR, epinephrine, and bicarbonate --> ROSC  --ventilatory dependent respiratory failure, currently intubated on 100% FiO2 with a concern of ARDS  --currently maintained on 4 pressors including phenylephrine norepinephrine, vasopressin, and epinephrine  --continue CVVH  --overall prognosis is guarded     Respiratory acidosis  --intubated, management as per critical care  --concern for ARDS  --pCO2 trending down  --pH 7 245     Anemia  - low but stable  - transfuse if hemoglobin less than 7  -history of an abdominal wall hematoma closely monitor     Hyponatremia--resolved  - volume mediated     Blood pressure/volume status  - hypotension, with underlying cardiorenal syndrome  - now currently on 4 pressors, epinephrine, norepinephrine, phenylephrine, and vasopressin to maintain the blood pressure  -currently on CVVH, running net -300 cc/hour  -colloid IV albumin also added     Hyperphosphatemia  --trending down  --monitor with CVVH  --electrolyte replacement protocol     Chronic kidney disease stage IIIB  --appears to have had some underlying progression of disease  --prior baseline creatinine 1 6-1 9 mg/dL  --outpatient nephrologist Dr Arthur     Urinary retention  --Gann catheter in place  --if the urine output becomes nonsignificant or even absent can discontinue Gann catheter     History of trauma to the abdominal wall resulting in hematoma  --status post IR pelvic angiogram on 12/4/2021 status post embolization  --recent CT scan reports mild increase in the size hematoma     Acute on chronic diastolic congestive heart failure  --with dilated cardiomyopathy and cirrhosis  --poor response to diuretic  --poor urine output  --now on CVVH  --ultrafiltration on CVVHD    Review of Systems: The entire 12 point ROS has been reviewed      Physical Exam:    General:  Intubated and sedated, morbidly obese  Skin:  Poor skin turgor  CVS:  S1-S2 appreciated, he regular and tachycardic  Lungs:  Coarse breath sounds with some rales  Abdomen:  Distended, nontender, no guarding  Access:  Right IJ temporary dialysis catheter with no erythema or exudate  Extremities:  He has massive edema  Neuro:  Sedated on the ventilator          Current Facility-Administered Medications:     acetaminophen (TYLENOL) tablet 650 mg, 650 mg, Oral, Q6H PRN, TRES Szymanski    albuterol inhalation solution 2 5 mg, 2 5 mg, Nebulization, Q6H PRN, TRES Szymanski, 2 5 mg at 22 0240    [] amiodarone (CORDARONE) 900 mg in dextrose 5 % 500 mL infusion, 1 mg/min, Intravenous, Continuous, Stopped at 22 **FOLLOWED BY** amiodarone (CORDARONE) 900 mg in dextrose 5 % 500 mL infusion, 0 5 mg/min, Intravenous, Continuous, ERNA KhanNP, Last Rate: 16 7 mL/hr at 22, 0 5 mg/min at 22    cisatracurium (NIMBEX) 200 mg in sodium chloride 0 9 % 500 mL infusion, 0 1-5 mcg/kg/min, Intravenous, Titrated, Last Rate: 49 8 mL/hr at 22, 2 mcg/kg/min at 22 **AND** artificial tear (LUBRIFRESH P M ) ophthalmic ointment, , Both Eyes, Q2H, TRES Flowers, Given at 22 0946    cefepime (MAXIPIME) 2,000 mg in dextrose 5 % 50 mL IVPB, 2,000 mg, Intravenous, Q12H, ERNA LagunasNP, Last Rate: 100 mL/hr at 22 0945, 2,000 mg at 22 0945    chlorhexidine (PERIDEX) 0 12 % oral rinse 15 mL, 15 mL, Mouth/Throat, Q12H [address], Eufemia Parker, ERNANP, 15 mL at 22 0834    EPINEPHrine 6000 mcg (DOUBLE CONCENTRATION) IV in sodium chloride 0 9% 250 mL, 1-20 mcg/min, Intravenous, Titrated, Julien Hawkins, ERNANP, Last Rate: 47 5 mL/hr at 22 0728, 19 mcg/min at 22 0728    epoprostenol (VELETRI) 30,000 ng/mL in sodium chloride 0 9 % 50 mL inhalation solution, 6 25-50 ng/kg/min (Ideal), Inhalation, Titrated, Eufemia Parker, CRNP, Last Rate: 7 5 mL/hr at 22 0650, 49 801 ng/kg/min at 2250    fentaNYL 1000 mcg in sodium chloride 0 9% 100mL infusion, 50 mcg/hr, Intravenous, Continuous, Mirella Juancho, CRNP, Last Rate: 5 mL/hr at 22 0006, 50 mcg/hr at 22    heparin (porcine) subcutaneous injection 7,500 Units, 7,500 Units, Subcutaneous, Q8H [address], Naima Pruitt PA-C, 7,500 Units at 02/17/22 0507    hydrocortisone sodium succinate (PF) (Solu-CORTEF) injection 50 mg, 50 mg, Intravenous, Q6H [address], TRES Lagunas, 50 mg at 02/17/22 0506    levalbuterol (XOPENEX) inhalation solution 1 25 mg, 1 25 mg, Nebulization, TID, TRES Laugnas, 1 25 mg at 02/17/22 0710    midazolam (VERSED) injection 2 mg, 2 mg, Intravenous, Once, TRES Lagunas    norepinephrine (LEVOPHED) 8 mg (DOUBLE CONCENTRATION) IV in sodium chloride 0 9% 250 mL, 1-90 mcg/min, Intravenous, Titrated, TRES Bynum, Last Rate: 187 5 mL/hr at 02/17/22 0905, 100 mcg/min at 02/17/22 0905    NxStage K 4/Ca 3 dialysis solution (RFP-401) 20,000 mL, 20,000 mL, Dialysis, Continuous, Tab Sanchez MD, 20,000 mL at 02/17/22 0733    pantoprazole (PROTONIX) injection 40 mg, 40 mg, Intravenous, Q24H [address], TRES Lagunas, 40 mg at 02/17/22 0834    phenylephrine (JAYSON-SYNEPHRINE) 100 mg (DOUBLE CONCENTRATION) IV in sodium chloride 0 9% 250 mL,  mcg/min, Intravenous, Titrated, TRES Bynum, Last Rate: 30 mL/hr at 02/17/22 0337, 200 mcg/min at 02/17/22 7357    polyethylene glycol (MIRALAX) packet 17 g, 17 g, Oral, BID, TRES Bynum, 17 g at 02/16/22 0930    propofol (DIPRIVAN) 1000 mg in 100 mL infusion (premix), 5-50 mcg/kg/min, Intravenous, Titrated, TRES Lagunas, Stopped at 02/16/22 1400    senna-docusate sodium (SENOKOT S) 8 6-50 mg per tablet 2 tablet, 2 tablet, Oral, BID, Elspevalente Molly, TRES, 2 tablet at 02/16/22 0930    sodium chloride 3 % inhalation solution 4 mL, 4 mL, Nebulization, Q6H, TRES Lagunas, 4 mL at 02/17/22 0708    trimethobenzamide (TIGAN) IM injection 200 mg, 200 mg, Intramuscular, Q6H PRN, Catarino Trujillo MD, 200 mg at 02/15/22 1615    vancomycin (VANCOCIN) 1,750 mg in sodium chloride 0 9 % 500 mL IVPB, 15 mg/kg (Adjusted), Intravenous, Q24H, Bruno Mckenzie MD, Last Rate: 250 mL/hr at 02/16/22 1348, 1,750 mg at 02/16/22 1348    vasopressin (PITRESSIN) 20 Units in sodium chloride 0 9 % 100 mL infusion, 0 04 Units/min, Intravenous, Continuous, TRES Lagunas, Last Rate: 12 mL/hr at 02/17/22 0826, 0 04 Units/min at 02/17/22 7415

## 2022-02-17 NOTE — ASSESSMENT & PLAN NOTE
· Patient with increasing oxygen requirements on 2/15 after episodes of vomiting  · He then became increasingly lethargic and unable to clear secretions  · S/p intubation on 2/15 with several aspiration occurrences; as patient was continuously vomiting   · Required emergent bronchoscopy for clearance of excessive secretions   · Repeat bronchoscopy was performed on 2/16  · Continue Vanco and Cefepime   · Continue nebs  · Continue seral ABG's  · Continue Nimbex/fentanyl   · Continue Veletri

## 2022-02-17 NOTE — ASSESSMENT & PLAN NOTE
Wt Readings from Last 3 Encounters:   02/16/22 (!) 166 kg (365 lb)   02/12/22 (!) 170 kg (374 lb 12 5 oz)   12/29/21 (!) 152 kg (336 lb)     · Patient noted to be volume overload with a increase in his weight of 20 kg since December and anasarca  · Continue CVVH, currently running even given requirements of 4 vasopressors

## 2022-02-17 NOTE — PROGRESS NOTES
[address] [facility]  Progress Note - Carry Verna 1960, 60 y o  male MRN: [id number]  Unit/Bed#: ICU 02 Encounter: [id number]  Primary Care Provider: Nery Espitia DO   Date and time admitted to hospital: 2/12/2022  6:51 PM    Severe sepsis with septic shock [facility])  Assessment & Plan  · Now with leukocytosis, hypothermia, lactic acidosis, hypotension, tachycardia  · Continue Vanco, day # 5  · Continue Cefepime, day # 2   · ECHO revealed EF of 43%, mild to moderate global hypokinesis and pulmonary artery systolic pressure of 54 mmhg   · Continue to trend lactic   · Continue vasopressor support  · Currently requiring Levophed, vasopressin, subha and epi     Cardiac arrest [facility])  Assessment & Plan  · Patient PEA cardiac arrest on 2/15  · Etiology secondary to aspiration hypoxia   · Received 2 rounds of CPR, Epi and bicarb and obtained ROSC and was intubated   · Continue vasopressor support to maintain MAP > 65      Acute respiratory failure with hypoxia and hypercapnia (HCC)  Assessment & Plan  · Patient with increasing oxygen requirements on 2/15 after episodes of vomiting  · He then became increasingly lethargic and unable to clear secretions  · S/p intubation on 2/15 with several aspiration occurrences; as patient was continuously vomiting   · Required emergent bronchoscopy for clearance of excessive secretions   · Repeat bronchoscopy was performed on 2/16  · Continue Vanco and Cefepime   · Continue nebs  · Continue seral ABG's  · Continue Nimbex/fentanyl   · Continue Veletri     Emesis  Assessment & Plan  · Patient with new nausea and vomiting on 2/15; appeared to be bilious at that time  · He was medicated with Zofran which was transitioned to Tigan secondary to prolonged Qtc   · Patient then developed massive amounts of emesis   · CT abd/chest obtained and revealed ileus   · Continue OG to low continous suction    RLL pneumonia  Assessment & Plan  · POA  · CT imaging on 2/8 revealed right lower lobe airspace opacities, concerning for pneumonia  · MRSA positive sputum  · Sputum culture positive for staph aureus   · Continue Vancomycin, Day # 5/7    * Acute renal failure superimposed on stage 3b chronic kidney disease [facility])  Assessment & Plan  Lab Results   Component Value Date    EGFR 38 02/16/2022    EGFR 38 02/16/2022    EGFR 39 02/16/2022    CREATININE 1 83 (H) 02/16/2022    CREATININE 1 82 (H) 02/16/2022    CREATININE 1 77 (H) 02/16/2022     · Patient admitted to [place] [id number] due to mechanical fall and volume overload  · Patient on lasix 80 BID, zaroxolyn and midodrine prior to admission  · Nephrology was consulted and the patient was trialed on a lasix infusion which was transitioned to a bumex infusion  · After he failed to make progress with diuresis;  CVVH was started  · Continue CVVH running even    Permanent atrial fibrillation (HCC)  Assessment & Plan  · Outpatient regimen consists of: metoprolol + coumadin   · Coumadin held due to abdominal wall hematoma  · Rates into 170's on 2/16 requiring Amio bolus x 2 and then initiated on continuous infusion  · Continue for now  · Remains on 4 pressors         Acute on chronic diastolic (congestive) heart failure (HCC)  Assessment & Plan  Wt Readings from Last 3 Encounters:   02/16/22 (!) 166 kg (365 lb)   02/12/22 (!) 170 kg (374 lb 12 5 oz)   12/29/21 (!) 152 kg (336 lb)     · Patient noted to be volume overload with a increase in his weight of 20 kg since December and anasarca  · Continue CVVH, currently running even given requirements of 4 vasopressors    Acute on chronic anemia  Assessment & Plan  · Due to abdominal wall hematoma, chronic anticoagulation  · s/p 3 units PRBC, last transfusion on 2/11      Abdominal wall hematoma  Assessment & Plan  · First diagnosed in december and underwent IR procedure   · Slightly increased in size  · Received 3 unit PRBC  · Surgery consulted and signed off - no intervention at this time  · If patient continus to bleed may require repeat IR intervention    Lymphedema  Assessment & Plan  · Chronic due to venous insufficiency and volume overload    Hyponatremia  Assessment & Plan  · Secondary to volume overload  · Continue CVVH  · Continue to monitor with repeat BMP        STEFF (obstructive sleep apnea)  Assessment & Plan  · Currently ventilated  · BIPAP as needed when extubated     Other cirrhosis of liver (HCC)  Assessment & Plan  · Possibly related to prior alcohol abuse  · Mild ascites noted on cat scan  · Outpatient follow up with GI    Urinary retention  Assessment & Plan  · Retrograde urethra gram and fluoroscopically guided urethral catheter placement by IR  · Maintain leahy catheter     ----------------------------------------------------------------------------------------  HPI/24hr events: Was bronched for a second time  Remains on 4 vasopressors  CVVH running even  Code status changed to DNR II  Required 2 boluses of Amio and started on Amio infusion for afib RVR with rate in 170's    Patient appropriate for transfer out of the ICU today?: No  Disposition: Continue Critical Care   Code Status: Level 2 - DNAR: but accepts endotracheal intubation  ---------------------------------------------------------------------------------------  SUBJECTIVE  STUART    Review of Systems  Review of systems was unable to be performed secondary to intubated/sedated  ---------------------------------------------------------------------------------------  OBJECTIVE    Vitals   Vitals:    22 0000 22 0030 22 0100 22 0200   BP:       Pulse: (!) 136 (!) 136 (!) 134 (!) 134   Resp: (!) 0 (!) 0 (!) 3 (!) 0   Temp: 99 3 °F (37 4 °C) 99 3 °F (37 4 °C) 99 3 °F (37 4 °C) 99 3 °F (37 4 °C)   TempSrc:       SpO2: 93% 93% 93% 93%   Weight:       Height:         Temp (24hrs), Av 9 °F (36 6 °C), Min:92 8 °F (33 8 °C), Max:100 4 °F (38 °C)  Current: Temperature: 99 3 °F (37 4 °C)  Arterial Line BP: 72/44  Arterial Line MAP (mmHg): 54 mmHg    Respiratory:  SpO2: SpO2: 93 %, SpO2 Activity: SpO2 Activity: At Rest, SpO2 Device: O2 Device: Ventilator    Invasive/non-invasive ventilation settings   Respiratory  Report   Lab Data (Last 4 hours)      02/16 2354            pH, Arterial       7 232             pCO2, Arterial       61 6             pO2, Arterial       58 8             HCO3, Arterial       25 3             Base Excess, Arterial       -2 5                  O2/Vent Data     None                Physical Exam  Constitutional:       Appearance: He is morbidly obese  He is ill-appearing  Interventions: He is sedated and intubated  Comments: Paralyzed    HENT:      Mouth/Throat:      Comments: ETT and OG tube present  Cardiovascular:      Rate and Rhythm: Tachycardia present  Rhythm irregular  Pulmonary:      Effort: He is intubated  Comments: Synch with the vent  Abdominal:      Comments: OG to low continuous suction with bilious output    Genitourinary:     Comments: Azeem present  Musculoskeletal:      Right lower leg: Edema present  Left lower leg: Edema present  Skin:     General: Skin is cool and dry     Neurological:      Comments: STUART paralyzed/sedated       Laboratory and Diagnostics:  Results from last 7 days   Lab Units 02/16/22  1222 02/15/22  2317 02/15/22  0612 02/14/22  0531 02/13/22  0534 02/12/22  1449 02/12/22  0430 02/11/22  1502 02/11/22  0641 02/10/22  1414 02/10/22  0911   WBC Thousand/uL 8 40 13 52* 6 97 7 26 6 38  --  6 02  --  6 53  --  6 39   HEMOGLOBIN g/dL 8 1* 7 8* 7 4* 7 1* 7 1* 7 4* 7 4*   < > 7 2*   < > 7 7*   HEMATOCRIT % 28 2* 27 5* 25 0* 24 0* 23 2* 23 7* 24 4*   < > 23 9*   < > 25 5*   PLATELETS Thousands/uL 128* 115* 100* 146* 165  --  178  --  170  --  192   NEUTROS PCT %  --  87* 80*  --  79*  --  76*  --  75  --  76*   BANDS PCT % 40*  --   --   --   --   --   --   --   --   --   --    MONOS PCT %  --  4 9  --  8  --  10  --  9  --  9   MONO PCT % 4  --   --   -- --   --   --   --   --   --   --     < > = values in this interval not displayed  Results from last 7 days   Lab Units 02/16/22 2355 02/16/22 2051 02/16/22 1222 02/16/22 0710 02/16/22  0236 02/15/22  2317 02/15/22  1821 02/15/22  1157 02/15/22  1157 02/13/22  2300 02/13/22  0534   SODIUM mmol/L 133* 134* 131* 133* 132* 131* 130*   < > 129*   < > 128*   POTASSIUM mmol/L 4 4 4 5 3 3* 3 4* 3 7 4 1 4 1   < > 3 7   < > 4 1   CHLORIDE mmol/L 98* 99* 98* 97* 98* 97* 95*   < > 94*   < > 87*   CO2 mmol/L 25 26 24 27 31 29 31   < > 30   < > 32   ANION GAP mmol/L 10 9 9 9 3* 5 4   < > 5   < > 9   BUN mg/dL 30* 29* 33* 37* 35* 33* 35*   < > 39*   < > 92*   CREATININE mg/dL 1 83* 1 82* 1 77* 1 94* 1 85* 1 74* 1 70*   < > 1 78*   < > 3 30*   CALCIUM mg/dL 8 5 8 3 8 1* 8 6 9 2 9 7 9 0   < > 8 9   < > 8 5   GLUCOSE RANDOM mg/dL 95 92 116 104 118 139 103   < > 109   < > 122   ALT U/L  --   --  11*  --   --  18  --   --  14  --  15   AST U/L  --   --  35  --   --  30  --   --  27  --  23   ALK PHOS U/L  --   --  84  --   --  119*  --   --  125*  --  119*   ALBUMIN g/dL  --   --  2 4*  --   --  2 7*  --   --  2 4*  --  2 1*   TOTAL BILIRUBIN mg/dL  --   --  2 40*  --   --  2 21*  --   --  1 54*  --  0 93    < > = values in this interval not displayed  Results from last 7 days   Lab Units 02/16/22 2355 02/16/22 2051 02/16/22 1222 02/16/22  0710 02/16/22  0236 02/15/22  2317 02/15/22  1821 02/15/22  1157 02/15/22  1157   MAGNESIUM mg/dL 2 2 1 9 1 9 2 1 2 1 2 2 2 1   < > 2 1   PHOSPHORUS mg/dL 3 4 3 5 3 5 4 4* 4 7*  --  4 1  --  4 0    < > = values in this interval not displayed        Results from last 7 days   Lab Units 02/16/22  1351 02/14/22  0531 02/11/22  0846 02/10/22  0911   INR  1 40* 1 22* 1 23* 1 53*          Results from last 7 days   Lab Units 02/16/22  2355 02/16/22  1838 02/16/22  1222 02/16/22  0712 02/16/22  0236 02/15/22  2317   LACTIC ACID mmol/L 5 1* 5 1* 3 8* 5 0* 2 2* 2 6*     ABG:  Results from last 7 days   Lab Units 02/16/22  2354   PH ART  7 232*   PCO2 ART mm Hg 61 6*   PO2 ART mm Hg 58 8*   HCO3 ART mmol/L 25 3   BASE EXC ART mmol/L -2 5   ABG SOURCE  Line, Arterial     VBG:  Results from last 7 days   Lab Units 02/16/22  2354 02/16/22  0237 02/16/22  0236   PH ALISIA   --   --  7 112*   PCO2 ALISIA mm Hg  --   --  97 9*   PO2 ALISIA mm Hg  --   --  36 7   HCO3 ALISIA mmol/L  --   --  30 5*   BASE EXC ALISIA mmol/L  --   --  -0 3   ABG SOURCE  Line, Arterial   < >  --     < > = values in this interval not displayed  Results from last 7 days   Lab Units 02/16/22  1222 02/14/22  0531 02/13/22  2300   PROCALCITONIN ng/ml 27 51* 0 64* 0 79*       Micro  Results from last 7 days   Lab Units 02/12/22  2027 02/10/22  1747   BLOOD CULTURE  No Growth After 4 Days  No Growth After 4 Days  --    MRSA CULTURE ONLY   --  Methicillin Resistant Staphylococcus aureus isolated*  This patient requires contact isolation precautions per [address] [employer] [place] law  Contact precautions are not required in [address] [place] [address] for nasal surveillance cultures  EKG: Atrial fib with rate of 128  Imaging: I have personally reviewed pertinent reports  Intake and Output  I/O       02/15 0701  02/16 0700 02/16 0701  02/17 0700    P  O  100     I V  (mL/kg) 434 9 (2 6) 7029 6 (42 3)    IV Piggyback 950 1100    Total Intake(mL/kg) 1484 9 (8 9) 8129 6 (49)    Urine (mL/kg/hr) 20 (0) 5 (0)    Emesis/NG output 1752     Other 1704 4680    Stool 0     Total Output 3476 4685    Net -1991 1 +3444 6          Unmeasured Stool Occurrence 1 x     Unmeasured Emesis Occurrence 2 x           Height and Weights   Height: 5' 11" (180 3 cm)  IBW (Ideal Body Weight): 75 3 kg  Body mass index is 50 91 kg/m²    Weight (last 2 days)     Date/Time Weight    02/16/22 1016 166 (365)    02/15/22 0600 166 (365 96)            Nutrition       Diet Orders   (From admission, onward)             Start     Ordered    02/15/22 1525  Diet NPO  Diet effective now        References: Nutrtion Support Algorithm Enteral vs  Parenteral   Question Answer Comment   Diet Type NPO    RD to adjust diet per protocol?  Yes        02/15/22 1525    02/14/22 1003  Dietary nutrition supplements  Once        Question Answer Comment   Select Supplement: Nepro-Vanilla    Frequency Lunch, Dinner        02/14/22 1002                  Active Medications  Scheduled Meds:  Current Facility-Administered Medications   Medication Dose Route Frequency Provider Last Rate    acetaminophen  650 mg Oral Q6H PRN TRES Morel      albumin human  25 g Intravenous Q6H TRES Alex      albuterol  2 5 mg Nebulization Q6H PRN TRES Morel      amiodarone  0 5 mg/min Intravenous Continuous ERNA MarionNP 0 5 mg/min (02/16/22 2132)    cisatracurium (NIMBEX) in 0 9% sodium chloride infusion  0 1-5 mcg/kg/min Intravenous Titrated Marea Matuzma, CRNP 2 mcg/kg/min (02/16/22 2126)    And    artificial tear   Both Eyes Q2H ERNA PerezNP      cefepime  2,000 mg Intravenous Q12H ERNA LagunasNP 2,000 mg (02/16/22 2204)    chlorhexidine  15 mL Mouth/Throat Q12H [address] TRES Watters      epinephrine  1-20 mcg/min Intravenous Titrated Kala Hawkins, CRNP 20 mcg/min (02/17/22 0001)    epoprostenol  6 25-50 ng/kg/min (Ideal) Inhalation Titrated ERNA LagunasNP 49 801 ng/kg/min (02/16/22 2358)    fentaNYL  50 mcg/hr Intravenous Continuous Mami Matuzma, CRNP 50 mcg/hr (02/17/22 0006)    heparin (porcine)  7,500 Units Subcutaneous [facility] Naima Pruitt PA-C      hydrocortisone sodium succinate  50 mg Intravenous Q6H [address] TRES Lagunas      levalbuterol  1 25 mg Nebulization TID Monna Sandhoff, CRCHACHO      midazolam  2 mg Intravenous Once Monna Sandhoff, CRNP      norepinephrine  1-90 mcg/min Intravenous Titrated Kala Hawkins, CRNP 100 mcg/min (02/17/22 0048)    NxStage K 4/Ca 3  20,000 mL Dialysis Continuous Jewels Coyle MD      pantoprazole  40 mg Intravenous Q24H [address] Eufemia Christian, CRNP      phenylephine   mcg/min Intravenous Titrated Sarajane Slight Sonday, CRNP 200 mcg/min (02/16/22 1924)    polyethylene glycol  17 g Oral BID Sarajane Slight Sonday, CRNP      propofol  5-50 mcg/kg/min Intravenous Titrated Eufemia Keith, CRNP Stopped (02/16/22 1400)    senna-docusate sodium  2 tablet Oral BID Royal Liming, CRNP      sodium chloride  4 mL Nebulization Q6H Eufemia Dagmar, CRNP      trimethobenzamide  200 mg Intramuscular Q6H PRN Ji Dash MD      vancomycin  15 mg/kg (Adjusted) Intravenous Q24H Love Baird MD 1,750 mg (02/16/22 1348)    vasopressin  0 04 Units/min Intravenous Continuous Eufemia Keith, CRNP 0 04 Units/min (02/17/22 0001)     Continuous Infusions:  amiodarone, 0 5 mg/min, Last Rate: 0 5 mg/min (02/16/22 2132)  cisatracurium (NIMBEX) in 0 9% sodium chloride infusion, 0 1-5 mcg/kg/min, Last Rate: 2 mcg/kg/min (02/16/22 2126)  epinephrine, 1-20 mcg/min, Last Rate: 20 mcg/min (02/17/22 0001)  epoprostenol, 6 25-50 ng/kg/min (Ideal), Last Rate: 49 801 ng/kg/min (02/16/22 2358)  fentaNYL, 50 mcg/hr, Last Rate: 50 mcg/hr (02/17/22 0006)  norepinephrine, 1-90 mcg/min, Last Rate: 100 mcg/min (02/17/22 0048)  NxStage K 4/Ca 3, 20,000 mL  phenylephine,  mcg/min, Last Rate: 200 mcg/min (02/16/22 1924)  propofol, 5-50 mcg/kg/min, Last Rate: Stopped (02/16/22 1400)  vasopressin, 0 04 Units/min, Last Rate: 0 04 Units/min (02/17/22 0001)      PRN Meds:   acetaminophen, 650 mg, Q6H PRN  albuterol, 2 5 mg, Q6H PRN  trimethobenzamide, 200 mg, Q6H PRN        Invasive Devices Review  Invasive Devices  Report    Peripheral Intravenous Line            Peripheral IV 02/13/22 Distal;Right;Upper;Ventral (anterior) Arm 3 days    Peripheral IV 02/13/22 Right Hand 3 days    Peripheral IV 02/16/22 Left;Ventral (anterior) <1 day    Peripheral IV 02/16/22 Right;Upper;Ventral (anterior) Arm <1 day          Arterial Line Arterial Line 02/16/22 Radial 1 day          Hemodialysis Catheter            HD Temporary Double Catheter 3 days          Drain            Urethral Catheter Other (Comment) 10 2 Fr  5 days          Airway            ETT  Cuffed;Oral 7 5 mm 1 day                Rationale for remaining devices: medically necessary  ---------------------------------------------------------------------------------------  Advance Directive and Living Will:      Power of :    POLST:    ---------------------------------------------------------------------------------------  Care Time Delivered:   No Critical Care time spent       TRES Whiet      Portions of the record may have been created with voice recognition software  Occasional wrong word or "sound a like" substitutions may have occurred due to the inherent limitations of voice recognition software    Read the chart carefully and recognize, using context, where substitutions have occurred

## 2022-02-18 PROBLEM — J18.9 RLL PNEUMONIA: Status: RESOLVED | Noted: 2022-01-01 | Resolved: 2022-01-01

## 2022-02-18 PROBLEM — R11.10 EMESIS: Status: RESOLVED | Noted: 2022-01-01 | Resolved: 2022-01-01

## 2022-02-18 PROBLEM — R57.9 SHOCK (HCC): Status: ACTIVE | Noted: 2021-01-01

## 2022-02-18 NOTE — PLAN OF CARE
Problem: SAFETY,RESTRAINT: NV/NON-SELF DESTRUCTIVE BEHAVIOR  Goal: Remains free of harm/injury (restraint for non violent/non self-detsructive behavior)  Description: INTERVENTIONS:  - Instruct patient/family regarding restraint use   - Assess and monitor physiologic and psychological status   - Provide interventions and comfort measures to meet assessed patient needs   - Identify and implement measures to help patient regain control  - Assess readiness for release of restraint   2/18/2022 1610 by Sosa Suarez RN  Outcome: Progressing  2/18/2022 0745 by Sosa Suarez RN  Outcome: Progressing  Goal: Returns to optimal restraint-free functioning  Description: INTERVENTIONS:  - Assess the patient's behavior and symptoms that indicate continued need for restraint  - Identify and implement measures to help patient regain control  - Assess readiness for release of restraint   2/18/2022 1610 by Sosa Suarez RN  Outcome: Progressing  2/18/2022 0745 by Sosa Suarez RN  Outcome: Progressing     Problem: CONFUSION/THOUGHT DISTURBANCE  Goal: Thought disturbances (confusion, delirium, depression, dementia or psychosis) are managed to maintain or return to baseline mental status and functional level  Description: INTERVENTIONS:  - Assess for possible contributors to  thought disturbance, including but not limited to medications, infection, impaired vision or hearing, underlying metabolic abnormalities, dehydration, respiratory compromise,  psychiatric diagnoses and notify attending PHYSICAN/AP  - Monitor and intervene to maintain adequate nutrition, hydration, elimination, sleep and activity  - Decrease environmental stimuli, including noise as appropriate  - Provide frequent contacts to provide refocusing, direction and reassurance as needed  Approach patient calmly with eye contact and at their level    - [place] high risk fall precautions, aspiration precautions and other safety measures, as indicated  - If delirium suspected, notify physician/AP of change in condition and request immediate in-person evaluation  - Pursue consults as appropriate including Geriatric (campus dependent), OT for cognitive evaluation/activity planning, psychiatric, pastoral care, etc   Outcome: Progressing     Problem: Nutrition/Hydration-ADULT  Goal: Nutrient/Hydration intake appropriate for improving, restoring or maintaining nutritional needs  Description: Monitor and assess patient's nutrition/hydration status for malnutrition  Collaborate with interdisciplinary team and initiate plan and interventions as ordered  Monitor patient's weight and dietary intake as ordered or per policy  Utilize nutrition screening tool and intervene as necessary  Determine patient's food preferences and provide high-protein, high-caloric foods as appropriate       INTERVENTIONS:  - Monitor oral intake, urinary output, labs, and treatment plans  - Assess nutrition and hydration status and recommend course of action  - Evaluate amount of meals eaten  - Assist patient with eating if necessary   - Allow adequate time for meals  - Recommend/ encourage appropriate diets, oral nutritional supplements, and vitamin/mineral supplements  - Order, calculate, and assess calorie counts as needed  - Recommend, monitor, and adjust tube feedings and TPN/PPN based on assessed needs  - Assess need for intravenous fluids  - Provide specific nutrition/hydration education as appropriate  - Include patient/family/caregiver in decisions related to nutrition  2/18/2022 1610 by Adrienne Sheppard RN  Outcome: Progressing  2/18/2022 0745 by Adrienne Sheppard RN  Outcome: Progressing

## 2022-02-18 NOTE — PROGRESS NOTES
[facility]  Progress Note - Memo Olivares 1960, 60 y o  male MRN: [id number]  Unit/Bed#: ICU 02 Encounter: [id number]  Primary Care Provider: Beryle Perry, DO   Date and time admitted to hospital: 2/12/2022  6:51 PM    Cardiac arrest [facility])  Assessment & Plan  · Patient PEA cardiac arrest on 2/15  · Etiology secondary to aspiration hypoxia   · Received 2 rounds of CPR, Epi and bicarb and obtained ROSC and was intubated   · Continue vasopressor support to maintain MAP > 65  · Once of sedation/paralytics need to evaluate his mental status for anoxic injury  · Following the arrest he was following commands, however, he continued to have significant refractory hypoxia which may have lead to secondary injury       Acute respiratory failure with hypoxia and hypercapnia ([facility] [address] )  Assessment & Plan  · Patient with increasing oxygen requirements on 2/15 after episodes of vomiting  · He then became increasingly lethargic and unable to clear secretions  · S/p intubation on 2/15 with several aspiration occurrences; as patient was continuously vomiting   · Required emergent bronchoscopy for clearance of excessive secretions   · Repeat bronchoscopy was performed on 2/16  · Continue Vanco and Cefepime   · Continue nebs  · Continue seral ABG's  · Continue Nimbex/fentanyl   · Adjust vent settings as able to maintain his oxygen saturation > 88%  · Likely a component of ARDS    Acute on chronic diastolic (congestive) heart failure (HCC)  Assessment & Plan  Wt Readings from Last 3 Encounters:   02/17/22 (!) 175 kg (384 lb 14 8 oz)   02/12/22 (!) 170 kg (374 lb 12 5 oz)   12/29/21 (!) 152 kg (336 lb)     · Patient noted to be volume overload with a increase in his weight of 20 kg since December and anasarca  · Continue CVVH, currently running even given requirements of 4 vasopressors    * Acute renal failure superimposed on stage 3b chronic kidney disease [facility])  Assessment & Plan  Lab Results Component Value Date    EGFR 45 02/17/2022    EGFR 43 02/17/2022    EGFR 41 02/17/2022    CREATININE 1 58 (H) 02/17/2022    CREATININE 1 64 (H) 02/17/2022    CREATININE 1 70 (H) 02/17/2022     · Patient admitted to [place] [id number] due to mechanical fall and volume overload  · Patient on lasix 80 BID, zaroxolyn and midodrine prior to admission  · Nephrology was consulted and the patient was trialed on a lasix infusion which was transitioned to a bumex infusion  · After he failed to make progress with diuresis;  CVVH was started  · Continue CVVH running even for now    RLL pneumonia  Assessment & Plan  · POA  · Now complicated by signifcant bilateral aspiration pneumonia/pneumonitis  · CT imaging on 2/8 revealed right lower lobe airspace opacities, concerning for pneumonia  · MRSA positive sputum  · Sputum culture positive for staph aureus   · Continue Vancomycin, Day # 6/7    Abdominal wall hematoma  Assessment & Plan  · First diagnosed in december and underwent IR procedure   · Slightly increased in size  · Received 3 unit PRBC  · Surgery consulted and signed off - no intervention at this time  · If patient continus to bleed may require repeat IR intervention    Permanent atrial fibrillation (HCC)  Assessment & Plan  · Outpatient regimen consists of: metoprolol + coumadin   · Coumadin held due to abdominal wall hematoma  · Rates into 170's on 2/16 requiring Amio bolus x 2 and then initiated on continuous infusion  · Continue for now  · Remains on 4 pressors         Emesis  Assessment & Plan  · Patient with new nausea and vomiting on 2/15; appeared to be bilious at that time  · He was medicated with Zofran which was transitioned to Tigan secondary to prolonged Qtc   · Patient then developed massive amounts of emesis   · CT abd/chest obtained and revealed ileus   · Continue OG to low continous suction    Urinary retention  Assessment & Plan  · Retrograde urethra gram and fluoroscopically guided urethral catheter placement by IR  · Maintain leahy catheter     Severe sepsis with septic shock (HCC)  Assessment & Plan  · Now with leukocytosis, hypothermia, lactic acidosis, hypotension, tachycardia  · Continue Vanco, day # 6  · Continue Cefepime, day # 3   · ECHO revealed EF of 43%, mild to moderate global hypokinesis and pulmonary artery systolic pressure of 54 mmhg   · Continue to trend lactic   · Continue vasopressor support  · Currently requiring Levophed, vasopressin, subha and epi     Hyponatremia  Assessment & Plan  · Secondary to volume overload  · Continue CVVH  · Continue to monitor with repeat BMP        Other cirrhosis of liver ([address] )  Assessment & Plan  · Possibly related to prior alcohol abuse  · Mild ascites noted on cat scan  · Outpatient follow up with GI    Acute on chronic anemia  Assessment & Plan  · Due to abdominal wall hematoma, chronic anticoagulation  · s/p 3 units PRBC, last transfusion on 2/11      STEFF (obstructive sleep apnea)  Assessment & Plan  · Currently ventilated  · BIPAP as needed when extubated     Lymphedema  Assessment & Plan  · Chronic due to venous insufficiency and volume overload    ----------------------------------------------------------------------------------------  HPI/24hr events: No events overnight    Patient appropriate for transfer out of the ICU today?: No  Disposition: Continue Critical Care   Code Status: Level 2 - DNAR: but accepts endotracheal intubation  ---------------------------------------------------------------------------------------  SUBJECTIVE  Intubated and sedated    Review of Systems  Review of systems was unable to be performed secondary to intuabted and sedated  ---------------------------------------------------------------------------------------  OBJECTIVE    Vitals   Vitals:    02/17/22 1800 02/17/22 1804 02/17/22 1900 02/17/22 1930   BP:       Pulse: (!) 122  (!) 124    Resp: (!) 30  15    Temp: 98 6 °F (37 °C) 98 6 °F (37 °C) 98 6 °F (37 °C)    TempSrc: Esophageal   SpO2: 100%  100%    Weight:       Height:         Temp (24hrs), Av 9 °F (37 2 °C), Min:97 5 °F (36 4 °C), Max:100 °F (37 8 °C)  Current: Temperature: 98 6 °F (37 °C)  Arterial Line BP: 78/42  Arterial Line MAP (mmHg): 56 mmHg    Respiratory:  SpO2: SpO2: 100 %, SpO2 Activity: SpO2 Activity: At Rest, SpO2 Device: O2 Device: Ventilator  Nasal Cannula O2 Flow Rate (L/min): 4 L/min    Invasive/non-invasive ventilation settings   Respiratory  Report   Lab Data (Last 4 hours)       1748            pH, Arterial       7 249             pCO2, Arterial       59 8             pO2, Arterial       115 8             HCO3, Arterial       25 6             Base Excess, Arterial       -1 8                  O2/Vent Data           Most Recent         Vent Mode   AC/VC      Resp Rate (BPM) (BPM)   38      Vt (mL) (mL)   400      FIO2 (%) (%)   100      PEEP (cmH2O) (cmH2O)   10      MV   16 1                  Physical Exam  Constitutional:       Appearance: He is obese  He is ill-appearing  HENT:      Head: Normocephalic  Mouth/Throat:      Mouth: Mucous membranes are moist    Eyes:      Pupils: Pupils are equal, round, and reactive to light  Neck:      Comments: Right IJ dialysis line  Cardiovascular:      Rate and Rhythm: Normal rate  Pulmonary:      Breath sounds: Wheezing and rales present  Abdominal:      General: There is distension  Tenderness: There is no abdominal tenderness  Musculoskeletal:         General: Swelling present  Cervical back: Neck supple  Right lower leg: Edema present  Left lower leg: Edema present  Comments: Chronic lymphedema bilateral lower extremities   Lymphadenopathy:      Cervical: No cervical adenopathy  Skin:     General: Skin is warm and dry     Neurological:      Comments: Intubated, sedated and under NMB agents             Laboratory and Diagnostics:  Results from last 7 days   Lab Units 22  0821 22  1222 02/15/22  2317 02/15/22  0612 02/14/22  0531 02/13/22  0534 02/12/22  1449 02/12/22  0430 02/12/22  0430 02/11/22  1502 02/11/22  0641   WBC Thousand/uL 29 10* 8 40 13 52* 6 97 7 26 6 38  --   --  6 02  --  6 53   HEMOGLOBIN g/dL 7 4* 8 1* 7 8* 7 4* 7 1* 7 1* 7 4*   < > 7 4*   < > 7 2*   HEMATOCRIT % 25 2* 28 2* 27 5* 25 0* 24 0* 23 2* 23 7*   < > 24 4*   < > 23 9*   PLATELETS Thousands/uL 67* 128* 115* 100* 146* 165  --   --  178  --  170   NEUTROS PCT %  --   --  87* 80*  --  79*  --   --  76*  --  75   BANDS PCT % 43* 40*  --   --   --   --   --   --   --   --   --    MONOS PCT %  --   --  4 9  --  8  --   --  10  --  9   MONO PCT % 9 4  --   --   --   --   --   --   --   --   --     < > = values in this interval not displayed       Results from last 7 days   Lab Units 02/17/22  1748 02/17/22  1154 02/17/22  0449 02/16/22  2355 02/16/22  2051 02/16/22  1222 02/16/22  0710 02/16/22  0236 02/15/22  2317 02/15/22  1821 02/15/22  1157 02/13/22  2300 02/13/22  0534   SODIUM mmol/L 138 139 138 133* 134* 131* 133*   < > 131*   < > 129*   < > 128*   POTASSIUM mmol/L 4 7 4 6 4 6 4 4 4 5 3 3* 3 4*   < > 4 1   < > 3 7   < > 4 1   CHLORIDE mmol/L 102 102 101 98* 99* 98* 97*   < > 97*   < > 94*   < > 87*   CO2 mmol/L 25 26 26 25 26 24 27   < > 29   < > 30   < > 32   ANION GAP mmol/L 11 11 11 10 9 9 9   < > 5   < > 5   < > 9   BUN mg/dL 26* 29* 27* 30* 29* 33* 37*   < > 33*   < > 39*   < > 92*   CREATININE mg/dL 1 58* 1 64* 1 70* 1 83* 1 82* 1 77* 1 94*   < > 1 74*   < > 1 78*   < > 3 30*   CALCIUM mg/dL 8 5 8 9 8 5 8 5 8 3 8 1* 8 6   < > 9 7   < > 8 9   < > 8 5   GLUCOSE RANDOM mg/dL 102 94 82 95 92 116 104   < > 139   < > 109   < > 122   ALT U/L  --   --   --   --   --  11*  --   --  18  --  14  --  15   AST U/L  --   --   --   --   --  35  --   --  30  --  27  --  23   ALK PHOS U/L  --   --   --   --   --  84  --   --  119*  --  125*  --  119*   ALBUMIN g/dL  --   --   --   --   --  2 4*  --   --  2 7*  --  2 4*  --  2 1* TOTAL BILIRUBIN mg/dL  --   --   --   --   --  2 40*  --   --  2 21*  --  1 54*  --  0 93    < > = values in this interval not displayed  Results from last 7 days   Lab Units 02/17/22  1748 02/17/22  1154 02/17/22  0449 02/16/22  2355 02/16/22  2051 02/16/22  1222 02/16/22  0710   MAGNESIUM mg/dL 2 0 2 0 2 0 2 2 1 9 1 9 2 1   PHOSPHORUS mg/dL 2 7 2 7 3 1 3 4 3 5 3 5 4 4*      Results from last 7 days   Lab Units 02/16/22  1351 02/14/22  0531 02/11/22  0846   INR  1 40* 1 22* 1 23*          Results from last 7 days   Lab Units 02/17/22  1620 02/17/22  1154 02/17/22  0449 02/16/22  2355 02/16/22  1838 02/16/22  1222 02/16/22  0712   LACTIC ACID mmol/L 2 3* 2 8* 4 1* 5 1* 5 1* 3 8* 5 0*     ABG:  Results from last 7 days   Lab Units 02/17/22  1748   PH ART  7 249*   PCO2 ART mm Hg 59 8*   PO2 ART mm Hg 115 8   HCO3 ART mmol/L 25 6   BASE EXC ART mmol/L -1 8   ABG SOURCE  Line, Arterial     VBG:  Results from last 7 days   Lab Units 02/17/22  1748 02/16/22  0237 02/16/22  0236   PH ALISIA   --   --  7 112*   PCO2 ALISIA mm Hg  --   --  97 9*   PO2 ALISIA mm Hg  --   --  36 7   HCO3 ALISIA mmol/L  --   --  30 5*   BASE EXC ALISIA mmol/L  --   --  -0 3   ABG SOURCE  Line, Arterial   < >  --     < > = values in this interval not displayed  Results from last 7 days   Lab Units 02/17/22  0449 02/16/22  1222 02/14/22  0531 02/13/22  2300   PROCALCITONIN ng/ml 92 31* 27 51* 0 64* 0 79*       Micro  Results from last 7 days   Lab Units 02/17/22  1223 02/16/22  0939 02/12/22  2027   BLOOD CULTURE  Received in Microbiology Lab  Culture in Progress  --  No Growth After 4 Days  No Growth After 4 Days  GRAM STAIN RESULT   --  No Polys or Bacteria seen  --        EKG:   Imaging: I have personally reviewed pertinent reports  and I have personally reviewed pertinent films in PACS    Intake and Output  I/O       02/16 0701  02/17 0700 02/17 0701  02/18 0700    P  O       I V  (mL/kg) 8433 6 (49 3) 4695 3 (26 8)    IV Piggyback 1141 7 250    Total Intake(mL/kg) 9575 3 (56) 4945 3 (28 3)    Urine (mL/kg/hr) 5 (0) 15 (0)    Emesis/NG output      Other 6891 4076    Stool      Total Output 6896 4091    Net +2679 3 +854 3                Height and Weights   Height: 5' 11" (180 3 cm)  IBW (Ideal Body Weight): 75 3 kg  Body mass index is 53 69 kg/m²  Weight (last 2 days)     Date/Time Weight    02/17/22 0845 175 (384 92)    02/17/22 0600 171 (376 99)    02/17/22 0500 171 (376 33)    02/16/22 1016 166 (365)    02/15/22 0600 166 (365 96)            Nutrition       Diet Orders   (From admission, onward)             Start     Ordered    02/15/22 1525  Diet NPO  Diet effective now        References:    Nutrtion Support Algorithm Enteral vs  Parenteral   Question Answer Comment   Diet Type NPO    RD to adjust diet per protocol?  Yes        02/15/22 1525    02/14/22 1003  Dietary nutrition supplements  Once        Question Answer Comment   Select Supplement: Nepro-Vanilla    Frequency Lunch, Dinner        02/14/22 1002                  Active Medications  Scheduled Meds:  Current Facility-Administered Medications   Medication Dose Route Frequency Provider Last Rate    acetaminophen  650 mg Oral Q6H PRN ERNA KeyNP      albumin human  25 g Intravenous Q6H Kenzie Ra Ord, CRNP      albuterol  2 5 mg Nebulization Q6H PRN TRES Key      amiodarone  0 5 mg/min Intravenous Continuous Kenzie Ra Mustaphaday, CRNP 0 5 mg/min (02/17/22 1655)    cisatracurium (NIMBEX) in 0 9% sodium chloride infusion  0 1-5 mcg/kg/min Intravenous Titrated Quillian Leader, CRNP 2 mcg/kg/min (02/17/22 1755)    And    artificial tear   Both Eyes Q2H Quillian Leader, CRNP      cefepime  2,000 mg Intravenous Q12H Eufemia Parker, CRNP 2,000 mg (02/17/22 0945)    chlorhexidine  15 mL Mouth/Throat Q12H [address] Eufemia Shannan Patches, CRNP      digoxin  250 mcg Intravenous Q6H Kenzie Ra Ord, CRNP      epinephrine  1-20 mcg/min Intravenous Titrated Kenzie Ra ERNA HawkinsNP 20 mcg/min (02/17/22 1805)    epoprostenol  6 25-50 ng/kg/min (Ideal) Inhalation Titrated ERNA LagunasNP 12 616 ng/kg/min (02/17/22 1519)    fentaNYL  50 mcg/hr Intravenous Continuous Karissa Regalado CRNP 50 mcg/hr (02/17/22 1932)    heparin (porcine)  500 Units/hr Pre Filter Continuous Lucian Finley  Units/hr (02/17/22 1625)    heparin (porcine)  7,500 Units Subcutaneous [facility] Naima Pruitt PA-C      hydrocortisone sodium succinate  50 mg Intravenous Q6H [address] TRES Lagunas      levalbuterol  1 25 mg Nebulization TID AdityaTRES Huang      norepinephrine  1-100 mcg/min Intravenous Titrated Kailey Hawkins CRNP 100 mcg/min (02/17/22 2004)    NxStage K 4/Ca 3  20,000 mL Dialysis Continuous Lucian Finley MD      pantoprazole  40 mg Intravenous Q24H [address] TRES Cai      phenylephine   mcg/min Intravenous Titrated Kailey Hawkins, CRNP 150 mcg/min (02/17/22 2014)    polyethylene glycol  17 g Oral BID TRES Melgar      propofol  5-50 mcg/kg/min Intravenous Titrated Eufemia Parker CRNP Stopped (02/16/22 1400)    senna-docusate sodium  2 tablet Oral BID Beau Flakes, CRNP      sodium chloride  4 mL Nebulization Q6H TRES Lagunas      vancomycin  15 mg/kg (Adjusted) Intravenous Q24H Ameena Cadet MD 1,750 mg (02/17/22 1200)    vasopressin  0 04 Units/min Intravenous Continuous Eufemia Parker CRNP 0 04 Units/min (02/17/22 1709)     Continuous Infusions:  amiodarone, 0 5 mg/min, Last Rate: 0 5 mg/min (02/17/22 1655)  cisatracurium (NIMBEX) in 0 9% sodium chloride infusion, 0 1-5 mcg/kg/min, Last Rate: 2 mcg/kg/min (02/17/22 1755)  epinephrine, 1-20 mcg/min, Last Rate: 20 mcg/min (02/17/22 1805)  epoprostenol, 6 25-50 ng/kg/min (Ideal), Last Rate: 12 616 ng/kg/min (02/17/22 1519)  fentaNYL, 50 mcg/hr, Last Rate: 50 mcg/hr (02/17/22 1932)  heparin (porcine), 500 Units/hr, Last Rate: 500 Units/hr (02/17/22 1625)  norepinephrine, 1-100 mcg/min, Last Rate: 100 mcg/min (02/17/22 2004)  NxStage K 4/Ca 3, 20,000 mL  phenylephine,  mcg/min, Last Rate: 150 mcg/min (02/17/22 2014)  propofol, 5-50 mcg/kg/min, Last Rate: Stopped (02/16/22 1400)  vasopressin, 0 04 Units/min, Last Rate: 0 04 Units/min (02/17/22 1709)      PRN Meds:   acetaminophen, 650 mg, Q6H PRN  albuterol, 2 5 mg, Q6H PRN        Invasive Devices Review  Invasive Devices  Report    Peripheral Intravenous Line            Peripheral IV 02/13/22 Distal;Right;Upper;Ventral (anterior) Arm 4 days    Peripheral IV 02/16/22 Left;Ventral (anterior) 1 day    Peripheral IV 02/16/22 Right;Upper;Ventral (anterior) Arm 1 day    Peripheral IV 02/17/22 Left;Proximal;Ventral (anterior) Forearm <1 day    Peripheral IV 02/17/22 Proximal;Right;Upper;Ventral (anterior) Arm <1 day          Arterial Line            Arterial Line 02/16/22 Radial 1 day          Hemodialysis Catheter            HD Temporary Double Catheter 3 days          Drain            Urethral Catheter Temperature probe 16 Fr  <1 day          Airway            ETT  Cuffed;Oral 7 5 mm 1 day                Rationale for remaining devices:   ---------------------------------------------------------------------------------------  Advance Directive and Living Will:      Power of :    POLST:    ---------------------------------------------------------------------------------------  Care Time Delivered:         TRES Alford      Portions of the record may have been created with voice recognition software  Occasional wrong word or "sound a like" substitutions may have occurred due to the inherent limitations of voice recognition software    Read the chart carefully and recognize, using context, where substitutions have occurred

## 2022-02-18 NOTE — PLAN OF CARE
PT intubated since 2/15, NPO x 4 days, recommend initiating TF as able Jevity Clovis@[employer] advance by 10mL every 6hrs to goal of 55mL/hr, add 1 pack of prosource, water flushes as needed per nephrology, provides 2040cal, 99g pro, 1003mL  Problem: Nutrition/Hydration-ADULT  Goal: Nutrient/Hydration intake appropriate for improving, restoring or maintaining nutritional needs  Description: Monitor and assess patient's nutrition/hydration status for malnutrition  Collaborate with interdisciplinary team and initiate plan and interventions as ordered  Monitor patient's weight and dietary intake as ordered or per policy  Utilize nutrition screening tool and intervene as necessary  Determine patient's food preferences and provide high-protein, high-caloric foods as appropriate       INTERVENTIONS:  - Monitor oral intake, urinary output, labs, and treatment plans  - Assess nutrition and hydration status and recommend course of action  - Evaluate amount of meals eaten  - Assist patient with eating if necessary   - Allow adequate time for meals  - Recommend/ encourage appropriate diets, oral nutritional supplements, and vitamin/mineral supplements  - Order, calculate, and assess calorie counts as needed  - Recommend, monitor, and adjust tube feedings and TPN/PPN based on assessed needs  - Assess need for intravenous fluids  - Provide specific nutrition/hydration education as appropriate  - Include patient/family/caregiver in decisions related to nutrition  Outcome: Not Progressing

## 2022-02-18 NOTE — PLAN OF CARE
Problem: Potential for Falls  Goal: Patient will remain free of falls  Description: INTERVENTIONS:  - Educate patient/family on patient safety including physical limitations  - Instruct patient to call for assistance with activity   - Consult OT/PT to assist with strengthening/mobility   - Keep Call bell within reach  - Keep bed low and locked with side rails adjusted as appropriate  - Keep care items and personal belongings within reach  - Initiate and maintain comfort rounds  - Make Fall Risk Sign visible to staff  - Offer Toileting every 2  Problem: MOBILITY - ADULT  Goal: Maintain or return to baseline ADL function  Description: INTERVENTIONS:  -  Assess patient's ability to carry out ADLs; assess patient's baseline for ADL function and identify physical deficits which impact ability to perform ADLs (bathing, care of mouth/teeth, toileting, grooming, dressing, etc )  - Assess/evaluate cause of self-care deficits   - Assess range of motion  - Assess patient's mobility; develop plan if impaired  - Assess patient's need for assistive devices and provide as appropriate  - Encourage maximum independence but intervene and supervise when necessary  - Involve family in performance of ADLs  - Assess for home care needs following discharge   - Consider OT consult to assist with ADL evaluation and planning for discharge  - Provide patient education as appropriate  Outcome: Progressing  Goal: Maintains/Returns to pre admission functional level  Description: INTERVENTIONS:  - Perform BMAT or MOVE assessment daily    - Set and communicate daily mobility goal to care team and patient/family/caregiver     - Collaborate with rehabilitation services on mobility goals if consulted  - Perform Range of Motion 4   Problem: Prexisting or High Potential for Compromised Skin Integrity  Goal: Skin integrity is maintained or improved  Description: INTERVENTIONS:  - Identify patients at risk for skin breakdown  - Assess and monitor skin integrity  - Assess and monitor nutrition and hydration status  - Monitor labs   - Assess for incontinence   - Turn and reposition patient  - Assist with mobility/ambulation  - Relieve pressure over bony prominences  - Avoid friction and shearing  - Provide appropriate hygiene as needed including keeping skin clean and dry  - Evaluate need for skin moisturizer/barrier cream  - Collaborate with interdisciplinary team   - Patient/family teaching  - Consider wound care consult   Outcome: Progressing     Problem: METABOLIC, FLUID AND ELECTROLYTES - ADULT  Goal: Electrolytes maintained within normal limits  Description: INTERVENTIONS:  - Monitor labs and assess patient for signs and symptoms of electrolyte imbalances  - Administer electrolyte replacement as ordered  - Monitor response to electrolyte replacements, including repeat lab results as appropriate  - Instruct patient on fluid and nutrition as appropriate  Outcome: Progressing  Goal: Fluid balance maintained  Description: INTERVENTIONS:  - Monitor labs   - Monitor I/O and WT  - Instruct patient on fluid and nutrition as appropriate  - Assess for signs & symptoms of volume excess or deficit  Outcome: Progressing  Goal: Glucose maintained within target range  Description: INTERVENTIONS:  - Monitor Blood Glucose as ordered  - Assess for signs and symptoms of hyperglycemia and hypoglycemia  - Administer ordered medications to maintain glucose within target range  - Assess nutritional intake and initiate nutrition service referral as needed  Outcome: Progressing     Problem: CARDIOVASCULAR - ADULT  Goal: Maintains optimal cardiac output and hemodynamic stability  Description: INTERVENTIONS:  - Monitor I/O, vital signs and rhythm  - Monitor for S/S and trends of decreased cardiac output  - Administer and titrate ordered vasoactive medications to optimize hemodynamic stability  - Assess quality of pulses, skin color and temperature  - Assess for signs of decreased coronary artery perfusion  - Instruct patient to report change in severity of symptoms  Outcome: Progressing  Goal: Absence of cardiac dysrhythmias or at baseline rhythm  Description: INTERVENTIONS:  - Continuous cardiac monitoring, vital signs, obtain 12 lead EKG if ordered  - Administer antiarrhythmic and heart rate control medications as ordered  - Monitor electrolytes and administer replacement therapy as ordered  Outcome: Progressing     Problem: RESPIRATORY - ADULT  Goal: Achieves optimal ventilation and oxygenation  Description: INTERVENTIONS:  - Assess for changes in respiratory status  - Assess for changes in mentation and behavior  - Position to facilitate oxygenation and minimize respiratory effort  - Oxygen administered by appropriate delivery if ordered  - Initiate smoking cessation education as indicated  - Encourage broncho-pulmonary hygiene including cough, deep breathe, Incentive Spirometry  - Assess the need for suctioning and aspirate as needed  - Assess and instruct to report SOB or any respiratory difficulty  - Respiratory Therapy support as indicated  Outcome: Progressing     Problem: HEMATOLOGIC - ADULT  Goal: Maintains hematologic stability  Description: INTERVENTIONS  - Assess for signs and symptoms of bleeding or hemorrhage  - Monitor labs  - Administer supportive blood products/factors as ordered and appropriate  Outcome: Progressing     Problem: Nutrition/Hydration-ADULT  Goal: Nutrient/Hydration intake appropriate for improving, restoring or maintaining nutritional needs  Description: Monitor and assess patient's nutrition/hydration status for malnutrition  Collaborate with interdisciplinary team and initiate plan and interventions as ordered  Monitor patient's weight and dietary intake as ordered or per policy  Utilize nutrition screening tool and intervene as necessary  Determine patient's food preferences and provide high-protein, high-caloric foods as appropriate  INTERVENTIONS:  - Monitor oral intake, urinary output, labs, and treatment plans  - Assess nutrition and hydration status and recommend course of action  - Evaluate amount of meals eaten  - Assist patient with eating if necessary   - Allow adequate time for meals  - Recommend/ encourage appropriate diets, oral nutritional supplements, and vitamin/mineral supplements  - Order, calculate, and assess calorie counts as needed  - Recommend, monitor, and adjust tube feedings and TPN/PPN based on assessed needs  - Assess need for intravenous fluids  - Provide specific nutrition/hydration education as appropriate  - Include patient/family/caregiver in decisions related to nutrition  Outcome: Progressing     Problem: PAIN - ADULT  Goal: Verbalizes/displays adequate comfort level or baseline comfort level  Description: Interventions:  - Encourage patient to monitor pain and request assistance  - Assess pain using appropriate pain scale  - Administer analgesics based on type and severity of pain and evaluate response  - Implement non-pharmacological measures as appropriate and evaluate response  - Consider cultural and social influences on pain and pain management  - Notify physician/advanced practitioner if interventions unsuccessful or patient reports new pain  Outcome: Progressing     Problem: INFECTION - ADULT  Goal: Absence or prevention of progression during hospitalization  Description: INTERVENTIONS:  - Assess and monitor for signs and symptoms of infection  - Monitor lab/diagnostic results  - Monitor all insertion sites, i e  indwelling lines, tubes, and drains  - Monitor endotracheal if appropriate and nasal secretions for changes in amount and color  - [place] appropriate cooling/warming therapies per order  - Administer medications as ordered  - Instruct and encourage patient and family to use good hand hygiene technique  - Identify and instruct in appropriate isolation precautions for identified infection/condition  Outcome: Progressing  Goal: Absence of fever/infection during neutropenic period  Description: INTERVENTIONS:  - Monitor WBC    Outcome: Progressing     Problem: SAFETY ADULT  Goal: Patient will remain free of falls  Description: INTERVENTIONS:  - Educate patient/family on patient safety including physical limitations  - Instruct patient to call for assistance with activity   - Consult OT/PT to assist with strengthening/mobility   - Keep Call bell within reach  - Keep bed low and locked with side rails adjusted as appropriate  - Keep care items and personal belongings within reach  - Initiate and maintain comfort rounds  - Make Fall Risk Sign visible to staff    - Apply yellow socks and bracelet for high fall risk patients  - Consider moving patient to room near nurses station  Outcome: Progressing  Goal: Maintain or return to baseline ADL function  Description: INTERVENTIONS:  -  Assess patient's ability to carry out ADLs; assess patient's baseline for ADL function and identify physical deficits which impact ability to perform ADLs (bathing, care of mouth/teeth, toileting, grooming, dressing, etc )  - Assess/evaluate cause of self-care deficits   - Assess range of motion  - Assess patient's mobility; develop plan if impaired  - Assess patient's need for assistive devices and provide as appropriate  - Encourage maximum independence but intervene and supervise when necessary  - Involve family in performance of ADLs  - Assess for home care needs following discharge   - Consider OT consult to assist with ADL evaluation and planning for discharge  - Provide patient education as appropriate  Outcome: Progressing  Goal: Maintains/Returns to pre admission functional level  Description: INTERVENTIONS:  - Perform BMAT or MOVE assessment daily    - Set and communicate daily mobility goal to care team and patient/family/caregiver     - Collaborate with rehabilitation services on mobility goals if consulted  - Perform Range of Motion 4 times a day  - Out of bed for toileting  - Record patient progress and toleration of activity level   Outcome: Progressing   times a day  - Reposition patient every 2 hours      - Out of bed for toileting  - Record patient progress and toleration of activity level   Outcome: Progressing    Hours, in advance of need    - Apply yellow socks and bracelet for high fall risk patients  - Consider moving patient to room near nurses station  Outcome: Progressing

## 2022-02-18 NOTE — PROGRESS NOTES
Vancomycin IV Pharmacy-to-Dose Consultation    Jennifer Pablo is a 60 y o  male who is currently receiving Vancomycin IV with management by the Pharmacy Consult service  Assessment/Plan:  The patient was reviewed  Pt continues to remain on CRRT and no signs or symptoms of nephrotoxicity and/or infusion reactions were documented in the chart  Based on todays assessment, continue current vancomycin (day # 5) dosing of 1750mg IV every 24 hours    We will continue to follow the patients culture results and clinical progress daily      Erik Wright, Pharmacist

## 2022-02-18 NOTE — ASSESSMENT & PLAN NOTE
Wt Readings from Last 3 Encounters:   02/17/22 (!) 175 kg (384 lb 14 8 oz)   02/12/22 (!) 170 kg (374 lb 12 5 oz)   12/29/21 (!) 152 kg (336 lb)     · Patient noted to be volume overload with a increase in his weight of 20 kg since December and anasarca  · Continue CVVH, currently running even given requirements of 4 vasopressors

## 2022-02-18 NOTE — ASSESSMENT & PLAN NOTE
· Patient PEA cardiac arrest on 2/15  · Etiology secondary to aspiration hypoxia   · Received 2 rounds of CPR, Epi and bicarb and obtained ROSC and was intubated   · Continue vasopressor support to maintain MAP > 65  · Once of sedation/paralytics need to evaluate his mental status for anoxic injury  · Following the arrest he was following commands, however, he continued to have significant refractory hypoxia which may have lead to secondary injury

## 2022-02-18 NOTE — ASSESSMENT & PLAN NOTE
· POA  · Now complicated by signifcant bilateral aspiration pneumonia/pneumonitis  · CT imaging on 2/8 revealed right lower lobe airspace opacities, concerning for pneumonia  · MRSA positive sputum  · Sputum culture positive for staph aureus   · Continue Vancomycin, Day # 6/7

## 2022-02-18 NOTE — PROGRESS NOTES
Pastoral Care Progress Note    2022  Patient: Memo Olivares : 1960  Admission Date & Time: 2022 1851  MRN: [id number] [facility]: [id number]        visit; family support and prayer  Chaplains will continue to follow-up as needed

## 2022-02-18 NOTE — ASSESSMENT & PLAN NOTE
· Patient with increasing oxygen requirements on 2/15 after episodes of vomiting  · He then became increasingly lethargic and unable to clear secretions  · S/p intubation on 2/15 with several aspiration occurrences; as patient was continuously vomiting   · Required emergent bronchoscopy for clearance of excessive secretions   · Repeat bronchoscopy was performed on 2/16  · Continue Vanco and Cefepime   · Continue nebs  · Continue seral ABG's  · Continue Nimbex/fentanyl   · Adjust vent settings as able to maintain his oxygen saturation > 88%  · Likely a component of ARDS

## 2022-02-18 NOTE — PROGRESS NOTES
PT intubated since 2/15, NPO x 4 days, recommend initiating TF as able [email or web address] advance by 10mL every 6hrs to goal of 55mL/hr, add 1 pack of prosource, water flushes as needed per nephrology, provides 2040cal, 99g pro, 1003mL

## 2022-02-18 NOTE — PROGRESS NOTES
Vancomycin IV Pharmacy-to-Dose Consultation    Andrés Baez is a 57 y o  male who is currently receiving Vancomycin IV with management by the Pharmacy Consult service  Assessment/Plan:  The patient was reviewed  No signs or symptoms of nephrotoxicity and/or infusion reactions were documented in the chart  Remains on CRRT per nephrology recommendations  Trough level today is 19  Potential Nephrotoxicity Factors:  Medications: vasopressors  Patient Factors: hypotension, renal dysfunction, elderly    Based on todays assessment, continue current vancomycin (day # 6) dosing of 1,750 mg q24h  No further levels will be drawn, as therapy is to be discontinued on 2/21, unless there are changes with CRRT  We will continue to follow the patients culture results and clinical progress daily      Jannette Kussmaul, PharmD, [facility] Clinical Pharmacist  [phone] or via [email or web address]

## 2022-02-18 NOTE — PROCEDURES
NEPHROLOGY DIALYSIS PROCEDURE NOTE      Patient seen and examined on CVVH, tolerating procedure well  All documentation, labs, medications were reviewed by myself, and the treatment plan was reviewed with nurse and patient  Seen on Dialysis at : 10:27 AM  Dialysis Access: Right IJ non tunneled dialysis catheter  Vitals:80/56, heart rate 96, saturating 100% on the ventilator  Events:  No issues with CVVH, after the addition of pre filter heparin due to clotting, currently running even, pressor requirements decreased from 4 to 3 pressors  CVVH: Therapy Fluid: 3 5 L/hr; Ultrafiltration:  Running even to allow better blood pressure and to wean off pressors ;  Blood Flow Rate: 250 mL/min; Dialysate: 2K/3Ca    SUMMARY:     58 yo man with PMH of CKD G5, CHF, anemia, lymphedema, STEFF, cirrhosis, AFib presents to [facility] after mechanical fall, found to be fluid overload   Nephrology was consulted for MARISABEL and volume overload   Patient transferred to [facility] for further management      Patient had cardiac arrest on February 15      Assessment/Plan:     Acute renal failure/acute tubular necrosis  --presumed to be secondary to cardiorenal syndrome with underlying acute tubular necrosis and hypotension and contrast associated nephropathy with underlying chronic kidney disease  --anuric, Gann catheter in place --> no objections discontinue the Gann catheter, his urine output at this point remains poor  --remains intubated and for pressors for blood pressure support  --continue CVVH, currently running even, aim to reduce pressure requirements and maintain the blood pressure  --continue pre filter heparin     Cardiac arrest/PEA  --February 15th  --received 2 rounds of CPR, epinephrine, and bicarbonate --> ROSC  --ventilatory dependent respiratory failure, currently intubated on 70% FiO2 with a concern of ARDS  --initially on 4 pressors now down to 3 pressors  --continue CVVH  --overall prognosis is guarded  --code status now changed to DNR     Respiratory acidosis  --intubated, vent management as per critical care  --concern for ARDS  --pCO2 trending down  --pH 7  279     Anemia  - hemoglobin continues to drift down, hemoglobin 6 6 this morning  - transfuse if hemoglobin less than 7  - history of an abdominal wall hematoma closely monitor  - planning for blood transfusion     Hyponatremia--resolved  - volume mediated     Blood pressure/volume status  - hypotension, with underlying cardiorenal syndrome  - now currently on 3 pressors  -currently on CVVH, running even  -colloid IV albumin also added     Hyperphosphatemia  --trending down  --monitor with CVVH  --electrolyte replacement protocol     Chronic kidney disease stage IIIB  --appears to have had some underlying progression of disease  --prior baseline creatinine 1 6-1 9 mg/dL  --outpatient nephrologist Dr Arthur     Urinary retention  --Gann catheter in place  --if the urine output becomes nonsignificant or even absent can discontinue Gann catheter     History of trauma to the abdominal wall resulting in hematoma  --status post IR pelvic angiogram on 12/4/2021 status post embolization  --recent CT scan reports mild increase in the size hematoma     Acute on chronic diastolic congestive heart failure  --with dilated cardiomyopathy and cirrhosis  --poor response to diuretic  --poor urine output  --now on CVVH  --currently running even at this time    Review of Systems:  Unable to obtain review systems as patient is intubated and sedated and critically ill    Physical Exam:    General:  Morbidly obese, critically ill-appearing, intubated and sedated  Skin:  Poor skin turgor, diffuse ecchymoses  CVS:  S1-S2 appreciated irregular  Lungs:  Coarse breath sounds with rales  Abdomen:  Distended, nontender, nondistended, no guarding  Access:  Right IJ non tunneled dialysis catheter with no erythema or exudate  Extremities:  Massive edema  Neuro:  Sedated on the ventilator          Current Facility-Administered Medications:     acetaminophen (TYLENOL) tablet 650 mg, 650 mg, Oral, Q6H PRN, Lorrie Amaya, CRNP    albumin human (FLEXBUMIN) 25 % injection 25 g, 25 g, Intravenous, Q6H, Raydao , CRNP, 25 g at 22 0405    albuterol inhalation solution 2 5 mg, 2 5 mg, Nebulization, Q6H PRN, Lorrie Batch, CRNP, 2 5 mg at 22 0305    [] amiodarone (CORDARONE) 900 mg in dextrose 5 % 500 mL infusion, 1 mg/min, Intravenous, Continuous, Stopped at 222 **FOLLOWED BY** amiodarone (CORDARONE) 900 mg in dextrose 5 % 500 mL infusion, 0 5 mg/min, Intravenous, Continuous, Raynojevon , CRNP, Last Rate: 16 7 mL/hr at 22 1655, 0 5 mg/min at 22 1655    cisatracurium (NIMBEX) 200 mg in sodium chloride 0 9 % 500 mL infusion, 0 1-5 mcg/kg/min, Intravenous, Titrated, Last Rate: 49 8 mL/hr at 22 0437, 2 mcg/kg/min at 22 0437 **AND** artificial tear (LUBRIFRESH P M ) ophthalmic ointment, , Both Eyes, Q2H, Ulisses Dunlap, ERNANP, Given at 22 0913    cefepime (MAXIPIME) 2,000 mg in dextrose 5 % 50 mL IVPB, 2,000 mg, Intravenous, Q12H, Eufemia Parker CRNP, Last Rate: 100 mL/hr at 22 0915, 2,000 mg at 22 0915    chlorhexidine (PERIDEX) 0 12 % oral rinse 15 mL, 15 mL, Mouth/Throat, Q12H [address], Eufemia Parker, CRNP, 15 mL at 22 0858    EPINEPHrine 6000 mcg (DOUBLE CONCENTRATION) IV in sodium chloride 0 9% 250 mL, 1-20 mcg/min, Intravenous, Titrated, Raynojevon , CRNP, Last Rate: 17 5 mL/hr at 22 0900, 7 mcg/min at 22 0900    epoprostenol (VELETRI) 30,000 ng/mL in sodium chloride 0 9 % 50 mL inhalation solution, 6 25-50 ng/kg/min (Ideal), Inhalation, Titrated, TRES Lagunas, Stopped at 22    fentaNYL 1000 mcg in sodium chloride 0 9% 100mL infusion, 50 mcg/hr, Intravenous, Continuous, ERNA FrazierNP, Last Rate: 5 mL/hr at 22 193, 50 mcg/hr at 22 1932    heparin (porcine) 25,000 units in 0 45% in sodium chloride 250 ml infusion (CMPD), 500 Units/hr, Pre Filter, Continuous, Lydia Chua MD, Last Rate: 5 mL/hr at 02/18/22 0900, 500 Units/hr at 02/18/22 0900    heparin (porcine) subcutaneous injection 7,500 Units, 7,500 Units, Subcutaneous, Q8H [facility], Naima Pruitt PA-C, 7,500 Units at 02/17/22 1418    influenza vaccine, recombinant, quadrivalent (FLUBLOK) IM injection 0 5 mL, 0 5 mL, Intramuscular, Once, Elizabeth Tompkins MD    [facility]) oral solution 20 g, 20 g, Oral, TID, TRES Gonzalez    levalbuterol [facility]) inhalation solution 1 25 mg, 1 25 mg, Nebulization, TID, TRES Lagunas, 1 25 mg at 02/18/22 0705    norepinephrine (LEVOPHED) 8 mg (DOUBLE CONCENTRATION) IV in sodium chloride 0 9% 250 mL, 1-100 mcg/min, Intravenous, Titrated, TRES Neves, Last Rate: 187 5 mL/hr at 02/18/22 0913, 100 mcg/min at 02/18/22 0913    NxStage K 4/Ca 3 dialysis solution (RFP-401) 20,000 mL, 20,000 mL, Dialysis, Continuous, Lydia Chua MD, 20,000 mL at 02/18/22 0236    pantoprazole (PROTONIX) injection 40 mg, 40 mg, Intravenous, Q24H [facility], TRES Lagunas, 40 mg at 02/18/22 0858    polyethylene glycol (MIRALAX) packet 17 g, 17 g, Oral, BID, TRES Neves, 17 g at 02/18/22 8711    senna-docusate sodium (SENOKOT S) 8 6-50 mg per tablet 2 tablet, 2 tablet, Oral, BID, TRES Thomas, 2 tablet at 02/18/22 0858    sodium chloride 3 % inhalation solution 4 mL, 4 mL, Nebulization, Q6H, TRES Lagunas, 4 mL at 02/18/22 0705    vancomycin (VANCOCIN) 1,750 mg in sodium chloride 0 9 % 500 mL IVPB, 15 mg/kg (Adjusted), Intravenous, Q24H, Rachid Wynn MD, Last Rate: 250 mL/hr at 02/17/22 1200, 1,750 mg at 02/17/22 1200    vasopressin (PITRESSIN) 20 Units in sodium chloride 0 9 % 100 mL infusion, 0 04 Units/min, Intravenous, Continuous, TRES Lagunas, Last Rate: 12 mL/hr at 02/18/22 0239, 0 04 Units/min at 02/18/22 0237

## 2022-02-18 NOTE — ASSESSMENT & PLAN NOTE
· Patient with new nausea and vomiting on 2/15; appeared to be bilious at that time  · He was medicated with Zofran which was transitioned to Tigan secondary to prolonged Qtc   · Patient then developed massive amounts of emesis   · CT abd/chest obtained and revealed ileus   · Continue OG to low continous suction

## 2022-02-18 NOTE — CASE MANAGEMENT
Case Management Assessment & Discharge Planning Note    Patient name Maria Fernanda Bellamy  Location ICU ICU  MRN [id number]  : 1960 Date 2022       Current Admission Date: 2022  Current Admission Diagnosis:Acute renal failure superimposed on stage 3b chronic kidney disease [facility])   Patient Active Problem List    Diagnosis Date Noted    Cardiac arrest ([facility] ) 2022    Emesis 2022    Urinary retention 2022    Abnormal CT of the chest 2022    RLL pneumonia 2022    Acute blood loss anemia 2021    Abdominal wall hematoma 2021    Long term (current) use of anticoagulants 10/14/2021    Hyperphosphatemia 2021    Elevated alkaline phosphatase level 2021    Neutropenia (HCC) 2021    Pancytopenia ([facility] ) 2021    NSVT (nonsustained ventricular tachycardia) ([facility] ) 2021    Hypoalbuminemia 2021    Chronic right-sided congestive heart failure ([facility] ) 2021    Severe sepsis with septic shock (HCC) 2021    Panniculitis 2021    Iron deficiency 2021    Right wrist pain 2021    Morbid (severe) obesity due to excess calories ([facility])     Smoking     Hypotension 2021    Acute kidney injury superimposed on chronic kidney disease ([facility] ) 03/15/2021    Hypertensive nephrosclerosis 03/15/2021    Acute renal failure superimposed on stage 3b chronic kidney disease ([facility] ) 03/15/2021    Hypercalcemia 03/10/2021    Anemia 2021    Constipation 2021    Pulmonary hypertension ([facility] ) 2021    Vitamin D deficiency 2021    Atelectasis 2021    Hyponatremia 2021    Lactic acidosis 2021    Permanent atrial fibrillation ([facility] ) 2021    Diverticulosis of colon 2021    Lesion of spleen 2021    Chronic diastolic CHF (congestive heart failure) ([facility] ) 2021    Acute respiratory failure with hypoxia and hypercapnia ([facility] ) 2021    Tobacco use 02/22/2021    Other cirrhosis of liver ([facility] ) 12/01/2020    Encounter for screening for other viral diseases  12/01/2020    Hypokalemia 09/04/2020    [facility]rombocytopenia [facility] ) 08/24/2020    Pleural effusion, right 08/21/2020    SOB (shortness of breath) 08/21/2020    Cirrhosis of liver with ascites ([facility] ) 08/21/2020    Tobacco abuse 08/21/2020    Acute on chronic anemia 08/21/2020    STEFF (obstructive sleep apnea)     Hypertensive heart and chronic kidney disease with heart failure and stage 1 through stage 4 chronic kidney disease, or chronic kidney disease ([facility] ) 10/16/2019    Chronic venous insufficiency 05/01/2019    Varicose veins of both lower extremities with inflammation 05/01/2019    Lymphedema 10/24/2018    Acute on chronic diastolic (congestive) heart failure ([facility] ) 10/17/2018      LOS (days): 6  Geometric Mean LOS (GMLOS) (days):   Days to GMLOS:     OBJECTIVE:  PATIENT READMITTED TO HOSPITAL  Risk of Unplanned Readmission Score: 49         Current admission status: Inpatient       Preferred Pharmacy:   [id number] [address] [facility] [place] [address]  Phone: [phone] Fax: [phone]    Primary Care Provider: Norberto Langford DO    Primary Insurance: Keila Moore [facility]  Secondary Insurance:     ASSESSMENT:  Ananda Salgado Ii [address] Representative - Spouse   Primary Phone: [phone] (Home)  Mobile Phone: [phone]               Advance Directives  Does patient have a [address]?: No  Was patient offered paperwork?: No (Pt declined during previous conversation and at this time is vented)  Does patient currently have a Health Care decision maker?: Yes, please see Health Care Proxy section  Does patient have Advance Directives?: No  Was patient offered paperwork?: No (Pt declined during previous conversation and at this time is vented)  Primary Contact: Zeinab Ruiz (spouse) [phone] Readmission Root Cause  30 Day Readmission: No    Patient Information  Admitted from[de-identified] Home  Mental Status: Sedated,Intubated  During Assessment patient was accompanied by: Spouse  Assessment information provided by[de-identified] Spouse,Other - please comment (Previous assessment)  Primary Caregiver: Self  Support Systems: Spouse/significant other,Family members  [place] of Residence: [facility] do you live in?: [address] entry access options   Select all that apply : Stairs  Number of steps to enter home : 4  Type of Current Residence: 2 story home  Upon entering residence, is there a bedroom on the main floor (no further steps)?: No  A bedroom is located on the following floor levels of residence (select all that apply):: 2nd Floor  Upon entering residence, is there a bathroom on the main floor (no further steps)?: No  Indicate which floors of current residence have a bathroom (select all the apply):: 2nd Floor  Number of steps to 2nd floor from main floor: One Flight  In the last 12 months, was there a time when you were not able to pay the mortgage or rent on time?: No  In the last 12 months, how many places have you lived?: 1  In the last 12 months, was there a time when you did not have a steady place to sleep or slept in a shelter (including now)?: No  Homeless/housing insecurity resource given?: N/A  Living Arrangements: Lives w/ Spouse/significant other    Activities of Daily Living Prior to Admission  Functional Status: Independent  Completes ADLs independently?: Yes  Ambulates independently?: Yes  Does patient use assisted devices?: Yes  Assisted Devices (DME) used: Quad Cane,Bedside Commode  Does patient currently own DME?: Yes  What DME does the patient currently own?: Bedside Commode,Quad Cane  Does patient have a history of Outpatient Therapy (PT/OT)?: No  Does the patient have a history of Short-Term Rehab?: Yes ([address])  Does patient have a history of HHC?: Yes  Does patient currently have [place]?: No         Patient Information Continued  Does patient have prescription coverage?: Yes  Within the past 12 months, you worried that your food would run out before you got the money to buy more : Never true  Within the past 12 months, the food you bought just didnt last and you didnt have money to get more : Never true  Food insecurity resource given?: N/A  Does patient receive dialysis treatments?: No  Does patient have a history of substance abuse?: No  Does patient have a history of Mental Health Diagnosis?: No         Means of Transportation  [facility]] Family transport  In the past 12 months, has lack of transportation kept you from medical appointments or from getting medications?: No  In the past 12 months, has lack of transportation kept you from meetings, work, or from getting things needed for daily living?: No  Was application for public transport provided?: N/A        DISCHARGE DETAILS:          Additional Comments: Due to pt's current status, discharge planning not discussed  CM will continue to follow and discuss potential discharge planning when appropriate  Pt is open with [facility] but is rehab or LTACH is needed, will discuss when determined  If [address] discussion is had, and deemed appropriate, hospice conversation will be had

## 2022-02-18 NOTE — ASSESSMENT & PLAN NOTE
· Now with leukocytosis, hypothermia, lactic acidosis, hypotension, tachycardia  · Continue Vanco, day # 6  · Continue Cefepime, day # 3   · ECHO revealed EF of 43%, mild to moderate global hypokinesis and pulmonary artery systolic pressure of 54 mmhg   · Continue to trend lactic   · Continue vasopressor support  · Currently requiring Levophed, vasopressin, subha and epi

## 2022-02-18 NOTE — ASSESSMENT & PLAN NOTE
Lab Results   Component Value Date    EGFR 45 02/17/2022    EGFR 43 02/17/2022    EGFR 41 02/17/2022    CREATININE 1 58 (H) 02/17/2022    CREATININE 1 64 (H) 02/17/2022    CREATININE 1 70 (H) 02/17/2022     · Patient admitted to [place] [address] due to mechanical fall and volume overload  · Patient on lasix 80 BID, zaroxolyn and midodrine prior to admission  · Nephrology was consulted and the patient was trialed on a lasix infusion which was transitioned to a bumex infusion  · After he failed to make progress with diuresis;  CVVH was started  · Continue CVVH running even for now

## 2022-02-18 NOTE — PROGRESS NOTES
02/18/22 1300   Clinical Encounter Type   Visited With Patient and family together   Routine Visit Follow-up   Continue Visiting Yes   Confucianist Encounters   Confucianist Needs Prayer

## 2022-02-19 PROBLEM — E87.1 HYPONATREMIA: Status: RESOLVED | Noted: 2021-01-01 | Resolved: 2022-01-01

## 2022-02-19 PROBLEM — K56.7 ILEUS (HCC): Status: ACTIVE | Noted: 2022-01-01

## 2022-02-19 NOTE — PLAN OF CARE
Problem: Potential for Falls  Goal: Patient will remain free of falls  Description: INTERVENTIONS:  - Educate patient/family on patient safety including physical limitations  - Instruct patient to call for assistance with activity   - Consult OT/PT to assist with strengthening/mobility   - Keep Call bell within reach  - Keep bed low and locked with side rails adjusted as appropriate  - Keep care items and personal belongings within reach  - Initiate and maintain comfort rounds  - Make Fall Risk Sign visible to staff    Problem: MOBILITY - ADULT  Goal: Maintain or return to baseline ADL function  Description: INTERVENTIONS:  -  Assess patient's ability to carry out ADLs; assess patient's baseline for ADL function and identify physical deficits which impact ability to perform ADLs (bathing, care of mouth/teeth, toileting, grooming, dressing, etc )  - Assess/evaluate cause of self-care deficits   - Assess range of motion  - Assess patient's mobility; develop plan if impaired  - Assess patient's need for assistive devices and provide as appropriate  - Encourage maximum independence but intervene and supervise when necessary  - Involve family in performance of ADLs  - Assess for home care needs following discharge   - Consider OT consult to assist with ADL evaluation and planning for discharge  - Provide patient education as appropriate  Outcome: Progressing  Goal: Maintains/Returns to pre admission functional level  Description: INTERVENTIONS:  - Perform BMAT or MOVE assessment daily    - Set and communicate daily mobility goal to care team and patient/family/caregiver     - Collaborate with rehabilitation services on mobility goals if consulted  - Perform Range of Motion 4   Problem: Prexisting or High Potential for Compromised Skin Integrity  Goal: Skin integrity is maintained or improved  Description: INTERVENTIONS:  - Identify patients at risk for skin breakdown  - Assess and monitor skin integrity  - Assess and monitor nutrition and hydration status  - Monitor labs   - Assess for incontinence   - Turn and reposition patient  - Assist with mobility/ambulation  - Relieve pressure over bony prominences  - Avoid friction and shearing  - Provide appropriate hygiene as needed including keeping skin clean and dry  - Evaluate need for skin moisturizer/barrier cream  - Collaborate with interdisciplinary team   - Patient/family teaching  - Consider wound care consult   Outcome: Progressing     Problem: METABOLIC, FLUID AND ELECTROLYTES - ADULT  Goal: Electrolytes maintained within normal limits  Description: INTERVENTIONS:  - Monitor labs and assess patient for signs and symptoms of electrolyte imbalances  - Administer electrolyte replacement as ordered  - Monitor response to electrolyte replacements, including repeat lab results as appropriate  - Instruct patient on fluid and nutrition as appropriate  Outcome: Progressing  Goal: Fluid balance maintained  Description: INTERVENTIONS:  - Monitor labs   - Monitor I/O and WT  - Instruct patient on fluid and nutrition as appropriate  - Assess for signs & symptoms of volume excess or deficit  Outcome: Progressing  Goal: Glucose maintained within target range  Description: INTERVENTIONS:  - Monitor Blood Glucose as ordered  - Assess for signs and symptoms of hyperglycemia and hypoglycemia  - Administer ordered medications to maintain glucose within target range  - Assess nutritional intake and initiate nutrition service referral as needed  Outcome: Progressing     Problem: CARDIOVASCULAR - ADULT  Goal: Maintains optimal cardiac output and hemodynamic stability  Description: INTERVENTIONS:  - Monitor I/O, vital signs and rhythm  - Monitor for S/S and trends of decreased cardiac output  - Administer and titrate ordered vasoactive medications to optimize hemodynamic stability  - Assess quality of pulses, skin color and temperature  - Assess for signs of decreased coronary artery perfusion  - Instruct patient to report change in severity of symptoms  Outcome: Progressing  Goal: Absence of cardiac dysrhythmias or at baseline rhythm  Description: INTERVENTIONS:  - Continuous cardiac monitoring, vital signs, obtain 12 lead EKG if ordered  - Administer antiarrhythmic and heart rate control medications as ordered  - Monitor electrolytes and administer replacement therapy as ordered  Outcome: Progressing     Problem: RESPIRATORY - ADULT  Goal: Achieves optimal ventilation and oxygenation  Description: INTERVENTIONS:  - Assess for changes in respiratory status  - Assess for changes in mentation and behavior  - Position to facilitate oxygenation and minimize respiratory effort  - Oxygen administered by appropriate delivery if ordered  - Initiate smoking cessation education as indicated  - Encourage broncho-pulmonary hygiene including cough, deep breathe, Incentive Spirometry  - Assess the need for suctioning and aspirate as needed  - Assess and instruct to report SOB or any respiratory difficulty  - Respiratory Therapy support as indicated  Outcome: Progressing     Problem: HEMATOLOGIC - ADULT  Goal: Maintains hematologic stability  Description: INTERVENTIONS  - Assess for signs and symptoms of bleeding or hemorrhage  - Monitor labs  - Administer supportive blood products/factors as ordered and appropriate  Outcome: Progressing     Problem: Nutrition/Hydration-ADULT  Goal: Nutrient/Hydration intake appropriate for improving, restoring or maintaining nutritional needs  Description: Monitor and assess patient's nutrition/hydration status for malnutrition  Collaborate with interdisciplinary team and initiate plan and interventions as ordered  Monitor patient's weight and dietary intake as ordered or per policy  Utilize nutrition screening tool and intervene as necessary  Determine patient's food preferences and provide high-protein, high-caloric foods as appropriate       INTERVENTIONS:  - Monitor oral intake, urinary output, labs, and treatment plans  - Assess nutrition and hydration status and recommend course of action  - Evaluate amount of meals eaten  - Assist patient with eating if necessary   - Allow adequate time for meals  - Recommend/ encourage appropriate diets, oral nutritional supplements, and vitamin/mineral supplements  - Order, calculate, and assess calorie counts as needed  - Recommend, monitor, and adjust tube feedings and TPN/PPN based on assessed needs  - Assess need for intravenous fluids  - Provide specific nutrition/hydration education as appropriate  - Include patient/family/caregiver in decisions related to nutrition  Outcome: Progressing     Problem: PAIN - ADULT  Goal: Verbalizes/displays adequate comfort level or baseline comfort level  Description: Interventions:  - Encourage patient to monitor pain and request assistance  - Assess pain using appropriate pain scale  - Administer analgesics based on type and severity of pain and evaluate response  - Implement non-pharmacological measures as appropriate and evaluate response  - Consider cultural and social influences on pain and pain management  - Notify physician/advanced practitioner if interventions unsuccessful or patient reports new pain  Outcome: Progressing     Problem: INFECTION - ADULT  Goal: Absence or prevention of progression during hospitalization  Description: INTERVENTIONS:  - Assess and monitor for signs and symptoms of infection  - Monitor lab/diagnostic results  - Monitor all insertion sites, i e  indwelling lines, tubes, and drains  - Monitor endotracheal if appropriate and nasal secretions for changes in amount and color  - [place] appropriate cooling/warming therapies per order  - Administer medications as ordered  - Instruct and encourage patient and family to use good hand hygiene technique  - Identify and instruct in appropriate isolation precautions for identified infection/condition  Outcome: Progressing  Goal: Absence of fever/infection during neutropenic period  Description: INTERVENTIONS:  - Monitor WBC    Outcome: Progressing     Problem: SAFETY ADULT  Goal: Patient will remain free of falls  Description: INTERVENTIONS:  - Educate patient/family on patient safety including physical limitations  - Instruct patient to call for assistance with activity   - Consult OT/PT to assist with strengthening/mobility   - Keep Call bell within reach  - Keep bed low and locked with side rails adjusted as appropriate  - Keep care items and personal belongings within reach  - Initiate and maintain comfort rounds  - Make Fall Risk Sign visible to staff    - Apply yellow socks and bracelet for high fall risk patients  - Consider moving patient to room near nurses station  Outcome: Progressing  Goal: Maintain or return to baseline ADL function  Description: INTERVENTIONS:  -  Assess patient's ability to carry out ADLs; assess patient's baseline for ADL function and identify physical deficits which impact ability to perform ADLs (bathing, care of mouth/teeth, toileting, grooming, dressing, etc )  - Assess/evaluate cause of self-care deficits   - Assess range of motion  - Assess patient's mobility; develop plan if impaired  - Assess patient's need for assistive devices and provide as appropriate  - Encourage maximum independence but intervene and supervise when necessary  - Involve family in performance of ADLs  - Assess for home care needs following discharge   - Consider OT consult to assist with ADL evaluation and planning for discharge  - Provide patient education as appropriate  Outcome: Progressing  Goal: Maintains/Returns to pre admission functional level  Description: INTERVENTIONS:  - Perform BMAT or MOVE assessment daily    - Set and communicate daily mobility goal to care team and patient/family/caregiver     - Collaborate with rehabilitation services on mobility goals if consulted  - Perform Range of Motion 4   Problem: Knowledge Deficit  Goal: Patient/family/caregiver demonstrates understanding of disease process, treatment plan, medications, and discharge instructions  Description: Complete learning assessment and assess knowledge base  Interventions:  - Provide teaching at level of understanding  - Provide teaching via preferred learning methods  Outcome: Progressing     Problem: SAFETY,RESTRAINT: NV/NON-SELF DESTRUCTIVE BEHAVIOR  Goal: Remains free of harm/injury (restraint for non violent/non self-detsructive behavior)  Description: INTERVENTIONS:  - Instruct patient/family regarding restraint use   - Assess and monitor physiologic and psychological status   - Provide interventions and comfort measures to meet assessed patient needs   - Identify and implement measures to help patient regain control  - Assess readiness for release of restraint   Outcome: Progressing  Goal: Returns to optimal restraint-free functioning  Description: INTERVENTIONS:  - Assess the patient's behavior and symptoms that indicate continued need for restraint  - Identify and implement measures to help patient regain control  - Assess readiness for release of restraint   Outcome: Progressing     Problem: CONFUSION/THOUGHT DISTURBANCE  Goal: Thought disturbances (confusion, delirium, depression, dementia or psychosis) are managed to maintain or return to baseline mental status and functional level  Description: INTERVENTIONS:  - Assess for possible contributors to  thought disturbance, including but not limited to medications, infection, impaired vision or hearing, underlying metabolic abnormalities, dehydration, respiratory compromise,  psychiatric diagnoses and notify attending PHYSICAN/AP  - Monitor and intervene to maintain adequate nutrition, hydration, elimination, sleep and activity  - Decrease environmental stimuli, including noise as appropriate  - Provide frequent contacts to provide refocusing, direction and reassurance as needed   Approach patient calmly with eye contact and at their level  - [place] high risk fall precautions, aspiration precautions and other safety measures, as indicated  - If delirium suspected, notify physician/AP of change in condition and request immediate in-person evaluation  - Pursue consults as appropriate including Geriatric (campus dependent), OT for cognitive evaluation/activity planning, psychiatric, pastoral care, etc   Outcome: Progressing   times a day  - Reposition patient every 2 hours  - Out of bed for toileting  - Record patient progress and toleration of activity level   Outcome: Progressing   times a day  - Reposition patient every 2 hours      - Out of bed for toileting  - Record patient progress and toleration of activity level   Outcome: Progressing     - Apply yellow socks and bracelet for high fall risk patients  - Consider moving patient to room near nurses station  Outcome: Progressing

## 2022-02-19 NOTE — ASSESSMENT & PLAN NOTE
Wt Readings from Last 3 Encounters:   02/18/22 (!) 171 kg (376 lb 5 2 oz)   02/12/22 (!) 170 kg (374 lb 12 5 oz)   12/29/21 (!) 152 kg (336 lb)     · Patient noted to be volume overload with a increase in his weight of 20 kg since December and anasarca  · Continue CVVH, currently running even  · Ideally would start volume removal, but hemodynamics preclude this  · Continue epinephrine for ionotropic support

## 2022-02-19 NOTE — ASSESSMENT & PLAN NOTE
· Patient with increasing oxygen requirements on 2/15 after episodes of vomiting, he became increasingly lethargic and unable to clear secretions   He was intubated with several aspiration occurrences; as patient was continuously vomiting   · 2/15 and 2/16 bronchoscopy for clearance of excessive secretions   · Mechanical ventilation  · 35/400/60/6  · Goal SpO2> 88%  · IBW TV 450cc for 6cc/kg however peak pressure 40-45 and Pplat 30 limiting increase  · VAP bundle ordered  · Continue nebs  · Continue to trend ABG's  · Continue dilaudid 1mg/hr for goal of deep sedation   · Veletri has been discontinued

## 2022-02-19 NOTE — ASSESSMENT & PLAN NOTE
· Patient PEA cardiac arrest on 2/15  · Etiology secondary to aspiration hypoxia, downtime approximately 12 minutes   · Trend neuro exam as appropriate  · Once of sedation/paralytics need to evaluate his mental status for anoxic injury  · Following the arrest he was following commands, however, he continued to have significant refractory hypoxia which may have lead to secondary injury

## 2022-02-19 NOTE — PROGRESS NOTES
[facility]  Progress Note - Miguelito Gomez 1960, 60 y o  male MRN: [id number]  Unit/Bed#: ICU 02 Encounter: [id number]  Primary Care Provider: Yasmin Yoo DO   Date and time admitted to hospital: 2/12/2022  6:51 PM    Shock [facility])  Assessment & Plan  · Multifactorial in setting of cardiac arrest, some cardiogenic component as evidenced by decreased EF on ECHO despite inotropic medications, and septic shock which is likely the largest component   · Septic component   · Continue broad spectrum ABX cefepime/vancomycin (days 5 and 8 respectively) through 2/21 for 10 days total   · Levophed 100, Vaso 0 04  · Wean as able for MAP > 65  · Cardiogenic component   · Epi 7  · Continue for cardiogenic support and also BP support, if able to wean down on levophed, would consider weaning down to lower levels  · ECHO revealed EF of 43%, mild to moderate global hypokinesis and pulmonary artery systolic pressure of 54 mmhg  IVC dilated, no retrophasic changes   · Would likely benefit from some volume removal however pressure is not tolerating that at this time  Cardiac arrest [facility])  Assessment & Plan  · Patient PEA cardiac arrest on 2/15  · Etiology secondary to aspiration hypoxia, downtime approximately 12 minutes   · Trend neuro exam as appropriate  · Once of sedation/paralytics need to evaluate his mental status for anoxic injury  · Following the arrest he was following commands, however, he continued to have significant refractory hypoxia which may have lead to secondary injury       Acute respiratory failure with hypoxia and hypercapnia ([facility] [address] )  Assessment & Plan  · Patient with increasing oxygen requirements on 2/15 after episodes of vomiting, he became increasingly lethargic and unable to clear secretions   He was intubated with several aspiration occurrences; as patient was continuously vomiting   · 2/15 and 2/16 bronchoscopy for clearance of excessive secretions   · Mechanical ventilation  · 35/400/60/6  · Goal SpO2> 88%  · IBW TV 450cc for 6cc/kg however peak pressure 40-45 and Pplat 30 limiting increase  · VAP bundle ordered  · Continue nebs  · Continue to trend ABG's  · Continue dilaudid 1mg/hr for goal of deep sedation   · Veletri has been discontinued       Emesis-resolved as of 2/18/2022  Assessment & Plan  · Patient with new nausea and vomiting on 2/15; appeared to be bilious at that time  · He was medicated with Zofran which was transitioned to Tigan secondary to prolonged Qtc   · Patient then developed massive amounts of emesis   · CT abd/chest obtained and revealed ileus   · Continue OG to low continous suction    RLL pneumonia-resolved as of 2/18/2022  Assessment & Plan  · POA  · Now complicated by signifcant bilateral aspiration pneumonia/pneumonitis  · CT imaging on 2/8 revealed right lower lobe airspace opacities, concerning for pneumonia  · MRSA positive sputum  · Sputum culture positive for staph aureus   · Continue Vancomycin, Day # 6/7    * Acute renal failure superimposed on stage 3b chronic kidney disease ([facility] [address] )  Assessment & Plan  Lab Results   Component Value Date    EGFR 56 02/18/2022    EGFR 54 02/18/2022    EGFR 51 02/18/2022    CREATININE 1 33 (H) 02/18/2022    CREATININE 1 38 (H) 02/18/2022    CREATININE 1 43 (H) 02/18/2022     · Patient admitted to [place] [address] due to mechanical fall and volume overload  · Patient on lasix 80 BID, zaroxolyn and midodrine prior to admission  · Nephrology was consulted and the patient was trialed on a lasix infusion which was transitioned to a bumex infusion  · After he failed to make progress with diuresis;  CVVH was started  · Continue CVVH running even for now    Permanent atrial fibrillation [facility])  Assessment & Plan  · Outpatient regimen consists of: metoprolol + coumadin   · Coumadin held due to abdominal wall hematoma and anemia, continue to hold  · Rates into 170's on 2/16 requiring Amio bolus x 2 and then initiated on continuous infusion  · Continue amio infusion 0 5  · Digoxin 62 5mg QoD, last level 1 6, consider rechecking prior to next dose      Acute on chronic diastolic (congestive) heart failure (HCC)  Assessment & Plan  Wt Readings from Last 3 Encounters:   02/18/22 (!) 171 kg (376 lb 5 2 oz)   02/12/22 (!) 170 kg (374 lb 12 5 oz)   12/29/21 (!) 152 kg (336 lb)     · Patient noted to be volume overload with a increase in his weight of 20 kg since December and anasarca  · Continue CVVH, currently running even  · Ideally would start volume removal, but hemodynamics preclude this  · Continue epinephrine for ionotropic support     Acute on chronic anemia  Assessment & Plan  · Due to abdominal wall hematoma  · s/p 4 units PRBC, last transfusion on 2/19  · Trend H&H QD likely recent drop is secondary to frequently phlebotomy and volume overload, no obvious source of bleeding at this time       Abdominal wall hematoma  Assessment & Plan  · First diagnosed in december and underwent IR procedure   · Slightly increased in size  · Received 3 unit PRBC  · Surgery consulted and signed off - no intervention at this time  · If patient continus to bleed may require repeat IR intervention  · Consider engaging surgery regarding increased abdominal pressure which may be contributing to ventilator needs   However this is likely multifactorial in setting of anasarca 2/2 volume overload in addition to abdominal wall hematoma and ileus     Lymphedema  Assessment & Plan  · Chronic due to venous insufficiency and volume overload    STEFF (obstructive sleep apnea)  Assessment & Plan  · Currently ventilated  · BIPAP as needed when extubated     Hyponatremia-resolved as of 2/19/2022  Assessment & Plan  · Secondary to volume overload  · Continue CVVH  · Continue to monitor with repeat BMP        Other cirrhosis of liver ([address] )  Assessment & Plan  · Possibly related to prior alcohol abuse vs vascular congestion 2/2 volume overload  · Trend LFTs · Outpatient follow up with GI    Urinary retention  Assessment & Plan  · Retrograde urethra gram and fluoroscopically guided urethral catheter placement by IR  · Maintain leahy catheter     Ileus (HCC)  Assessment & Plan  · OGT to low intermittent suction   · Continue bowel regiment   · Monitor for return of bowel function  Continue goals of care discussion with family     ----------------------------------------------------------------------------------------  HPI/24hr events: Given 1U PRBC for hgb <7 0  Attempted to titrate up epi and down titrate levophed with worsening hemodynamics  Epi down titrated back to 7 from 10 and hemodynamics improved  Multiple ventilator changes made to assist with CO2 clearance given limitations of peak/plateau pressures    Sedation transitioned to dilaudid from fentanyl     Patient appropriate for transfer out of the ICU today?: No  Disposition: Continue Critical Care   Code Status: Level 2 - DNAR: but accepts endotracheal intubation  ---------------------------------------------------------------------------------------  SUBJECTIVE  Unable to provide    Review of Systems  Review of systems was unable to be performed secondary to intubated  ---------------------------------------------------------------------------------------  OBJECTIVE    Vitals   Vitals:    22 2145 22 2245 22 2300 22 0300   BP:  116/52     Pulse: 104 105 (!) 110    Resp: (!) 37 (!) 28 (!) 30    Temp: 98 2 °F (36 8 °C) 98 4 °F (36 9 °C) 98 6 °F (37 °C)    TempSrc:       SpO2: 100% 100% 99%    Weight:    (!) 168 kg (370 lb 13 oz)   Height:         Temp (24hrs), Av 7 °F (36 5 °C), Min:96 4 °F (35 8 °C), Max:98 6 °F (37 °C)  Current: Temperature: 98 6 °F (37 °C)  Arterial Line BP: 104/52  Arterial Line MAP (mmHg): 68 mmHg      Invasive/non-invasive ventilation settings   Respiratory  Report   Lab Data (Last 4 hours)    None         O2/Vent Data (Last 4 hours)       1190  Vent Mode AC/VC       Resp Rate (BPM) (BPM) 35       Vt (mL) (mL) 400       FIO2 (%) (%) 60       PEEP (cmH2O) (cmH2O) 6       MV 14 8                   Physical Exam  Constitutional:       General: He is not in acute distress  Appearance: He is obese  He is ill-appearing and toxic-appearing  He is not diaphoretic  HENT:      Head: Normocephalic and atraumatic  Mouth/Throat:      Mouth: Mucous membranes are moist    Eyes:      Comments: Pupils 2mm and reactive b/l    Cardiovascular:      Rate and Rhythm: Tachycardia present  Rhythm irregular  Pulmonary:      Comments: Tachypnea  Diminished b/l breath sounds   Abdominal:      Comments: Firm  Obese   Pitting edema throughout abdomen particularly worse in dependant areas    Musculoskeletal:      Right lower leg: Edema present  Left lower leg: Edema present        Comments: Anasarca    Neurological:      Comments: occasionally moves legs and shakes head back and forth  Formal sedation break not attempted at this time 2/2 hemodynamics              Laboratory and Diagnostics:  Results from last 7 days   Lab Units 02/18/22  0423 02/17/22  0821 02/16/22  1222 02/15/22  2317 02/15/22  0612 02/14/22  0531 02/13/22  0534   WBC Thousand/uL 31 31* 29 10* 8 40 13 52* 6 97 7 26 6 38   HEMOGLOBIN g/dL 6 6* 7 4* 8 1* 7 8* 7 4* 7 1* 7 1*   HEMATOCRIT % 22 8* 25 2* 28 2* 27 5* 25 0* 24 0* 23 2*   PLATELETS Thousands/uL 47* 67* 128* 115* 100* 146* 165   NEUTROS PCT %  --   --   --  87* 80*  --  79*   BANDS PCT %  --  43* 40*  --   --   --   --    MONOS PCT %  --   --   --  4 9  --  8   MONO PCT %  --  9 4  --   --   --   --      Results from last 7 days   Lab Units 02/18/22  2358 02/18/22  1753 02/18/22  1222 02/18/22  0423 02/18/22  0011 02/17/22  1748 02/17/22  1154 02/16/22  2051 02/16/22  1222 02/16/22  0236 02/15/22  2317 02/15/22  1821 02/15/22  1157 02/13/22  2300 02/13/22  0534   SODIUM mmol/L 137 137 137 137 138 138 139   < > 131*   < > 131*   < > 129* < > 128*   POTASSIUM mmol/L 4 4 4 5 4 8 4 8 4 8 4 7 4 6   < > 3 3*   < > 4 1   < > 3 7   < > 4 1   CHLORIDE mmol/L 102 102 102 102 102 102 102   < > 98*   < > 97*   < > 94*   < > 87*   CO2 mmol/L 22 24 24 24 24 25 26   < > 24   < > 29   < > 30   < > 32   ANION GAP mmol/L 13 11 11 11 12 11 11   < > 9   < > 5   < > 5   < > 9   BUN mg/dL 21 20 22 24 23 26* 29*   < > 33*   < > 33*   < > 39*   < > 92*   CREATININE mg/dL 1 31* 1 33* 1 38* 1 43* 1 52* 1 58* 1 64*   < > 1 77*   < > 1 74*   < > 1 78*   < > 3 30*   CALCIUM mg/dL 8 6 8 5 8 8 8 9 8 7 8 5 8 9   < > 8 1*   < > 9 7   < > 8 9   < > 8 5   GLUCOSE RANDOM mg/dL 101 96 107 105 106 102 94   < > 116   < > 139   < > 109   < > 122   ALT U/L  --   --   --   --   --   --   --   --  11*  --  18  --  14  --  15   AST U/L  --   --   --   --   --   --   --   --  35  --  30  --  27  --  23   ALK PHOS U/L  --   --   --   --   --   --   --   --  84  --  119*  --  125*  --  119*   ALBUMIN g/dL  --   --   --   --   --   --   --   --  2 4*  --  2 7*  --  2 4*  --  2 1*   TOTAL BILIRUBIN mg/dL  --   --   --   --   --   --   --   --  2 40*  --  2 21*  --  1 54*  --  0 93    < > = values in this interval not displayed       Results from last 7 days   Lab Units 02/18/22  2358 02/18/22  1753 02/18/22  1222 02/18/22  0423 02/18/22  0011 02/17/22  1748 02/17/22  1154   MAGNESIUM mg/dL 2 0 2 1 2 2 2 2 1 9 2 0 2 0   PHOSPHORUS mg/dL 1 8* 2 5 2 6 2 2* 2 4 2 7 2 7      Results from last 7 days   Lab Units 02/18/22  0423 02/16/22  1351 02/14/22  0531   INR  2 02* 1 40* 1 22*   PTT seconds 66*  --   --           Results from last 7 days   Lab Units 02/18/22  2358 02/18/22  0423 02/17/22  1620 02/17/22  1154 02/17/22  0449 02/16/22  2355 02/16/22  1838   LACTIC ACID mmol/L 1 9 1 7 2 3* 2 8* 4 1* 5 1* 5 1*     ABG:  Results from last 7 days   Lab Units 02/19/22  0000   PH ART  7 307*   PCO2 ART mm Hg 48 4*   PO2 ART mm Hg 106 9   HCO3 ART mmol/L 23 7   BASE EXC ART mmol/L -2 6   ABG SOURCE  Line, Arterial     VBG:  Results from last 7 days   Lab Units 02/19/22  0000 02/16/22  0237 02/16/22  0236   PH ALISIA   --   --  7 112*   PCO2 ALISIA mm Hg  --   --  97 9*   PO2 ALISIA mm Hg  --   --  36 7   HCO3 ALISIA mmol/L  --   --  30 5*   BASE EXC ALISIA mmol/L  --   --  -0 3   ABG SOURCE  Line, Arterial   < >  --     < > = values in this interval not displayed  Results from last 7 days   Lab Units 02/18/22  0423 02/17/22  0449 02/16/22  1222 02/14/22  0531 02/13/22  2300   PROCALCITONIN ng/ml 97 02* 92 31* 27 51* 0 64* 0 79*       Micro  Results from last 7 days   Lab Units 02/17/22  1428 02/17/22  1223 02/16/22  0939 02/12/22  2027   BLOOD CULTURE  No Growth at 24 hrs  No Growth at 24 hrs   --  No Growth After 5 Days  No Growth After 5 Days  GRAM STAIN RESULT   --   --  No Polys or Bacteria seen  --        EKG: no new  Imaging: no new    Intake and Output  I/O       02/17 0701  02/18 0700 02/18 0701  02/19 0700    I V  (mL/kg) 8040 8 (47) 4553 8 (26 6)    Blood  350    IV Piggyback 450 170    Total Intake(mL/kg) 8490 8 (49 7) 5073 8 (29 7)    Urine (mL/kg/hr) 18 (0) 10 (0)    Other 8585 5027    Total Output 8603 5037    Net -112 2 +36 8                Height and Weights   Height: 5' 11" (180 3 cm)  IBW (Ideal Body Weight): 75 3 kg  Body mass index is 51 72 kg/m²  Weight (last 2 days)     Date/Time Weight    02/19/22 0300 168 (370 81)    02/18/22 0532 171 (376 33)    02/18/22 0500 171 (376 33)    02/17/22 0845 175 (384 92)    02/17/22 0600 171 (376 99)    02/17/22 0500 171 (376 33)            Nutrition       Diet Orders   (From admission, onward)             Start     Ordered    02/15/22 1525  Diet NPO  Diet effective now        References:    Nutrtion Support Algorithm Enteral vs  Parenteral   Question Answer Comment   Diet Type NPO    RD to adjust diet per protocol?  Yes        02/15/22 1306                  Active Medications  Scheduled Meds:  Current Facility-Administered Medications   Medication Dose Route Frequency Provider Last Rate    acetaminophen  650 mg Oral Q6H PRN TRES Vance      albumin human  25 g Intravenous Q6H Jewelene Gladis OrdTRES      albuterol  2 5 mg Nebulization Q6H PRN TRES Vance      amiodarone  0 5 mg/min Intravenous Continuous Jewelene Gladis Sonday, TRES 0 5 mg/min (02/19/22 0300)    cisatracurium (NIMBEX) in 0 9% sodium chloride infusion  0 1-5 mcg/kg/min Intravenous Titrated TRES Gonsalez Stopped (02/18/22 1530)    And    artificial tear   Both Eyes Q2H TRES Gonsalez      cefepime  2,000 mg Intravenous Q12H TRES Lagunas 2,000 mg (02/18/22 2157)    chlorhexidine  15 mL Mouth/Throat Q12H [address] TRES Cai      digoxin  62 5 mcg Intravenous Every Other Day TRES [place]      epinephrine  1-20 mcg/min Intravenous Titrated Jewelene Gladis Sonday, CRNP 7 mcg/min (02/19/22 0300)    heparin (porcine)  500 Units/hr Pre Filter Continuous Prem Simpson  Units/hr (02/19/22 0453)    heparin (porcine)  7,500 Units Subcutaneous [facility] Naima Pruitt PA-C      HYDROmorphone  1 mg/hr Intravenous Continuous Brittney Jefferson PA-C 1 mg/hr (02/19/22 0300)    HYDROmorphone  1 mg Intravenous Q4H PRN Brittney Jefferson PA-C      influenza vaccine  0 5 mL Intramuscular Once Beverly Barrientos MD      lactulose  20 g Oral TID TRES [place]      levalbuterol  1 25 mg Nebulization TID MabTRES Mendoza      norepinephrine  1-100 mcg/min Intravenous Titrated Jewelene Gladis Sonday, CRNP 100 mcg/min (02/19/22 0453)    NxStage K 4/Ca 3  20,000 mL Dialysis Continuous Prem Simpson MD      pantoprazole  40 mg Intravenous Q24H [address] TRES [place]      polyethylene glycol  17 g Oral BID Jewelene Gladis SondayTRES      senna-docusate sodium  2 tablet Oral BID TRES Vance      sodium chloride  4 mL Nebulization Q6H TRES Lagun[place]      vancomycin  15 mg/kg (Adjusted) Intravenous Q24H Karen Bazzi MD 1,750 mg (02/18/22 1333)    vasopressin  0 04 Units/min Intravenous Continuous Eufemia Keith, CRNP 0 04 Units/min (02/19/22 0300)     Continuous Infusions:  amiodarone, 0 5 mg/min, Last Rate: 0 5 mg/min (02/19/22 0300)  cisatracurium (NIMBEX) in 0 9% sodium chloride infusion, 0 1-5 mcg/kg/min, Last Rate: Stopped (02/18/22 1530)  epinephrine, 1-20 mcg/min, Last Rate: 7 mcg/min (02/19/22 0300)  heparin (porcine), 500 Units/hr, Last Rate: 500 Units/hr (02/19/22 0453)  HYDROmorphone, 1 mg/hr, Last Rate: 1 mg/hr (02/19/22 0300)  norepinephrine, 1-100 mcg/min, Last Rate: 100 mcg/min (02/19/22 0453)  NxStage K 4/Ca 3, 20,000 mL  vasopressin, 0 04 Units/min, Last Rate: 0 04 Units/min (02/19/22 0300)      PRN Meds:   acetaminophen, 650 mg, Q6H PRN  albuterol, 2 5 mg, Q6H PRN  HYDROmorphone, 1 mg, Q4H PRN        Invasive Devices Review  Invasive Devices  Report    Peripheral Intravenous Line            Peripheral IV 02/16/22 Left;Ventral (anterior) 2 days    Peripheral IV 02/16/22 Right;Upper;Ventral (anterior) Arm 2 days    Peripheral IV 02/17/22 Left;Proximal;Ventral (anterior) Forearm 1 day    Peripheral IV 02/17/22 Proximal;Right;Upper;Ventral (anterior) Arm 1 day          Arterial Line            Arterial Line 02/16/22 Radial 3 days          Hemodialysis Catheter            HD Temporary Double Catheter 5 days          Drain            Urethral Catheter Temperature probe 16 Fr  1 day    NG/OG/Enteral Tube Orogastric <1 day          Airway            ETT  Cuffed;Oral 7 5 mm 3 days                Rationale for remaining devices: critical illness  ---------------------------------------------------------------------------------------  Advance Directive and Living Will:      Power of :    POLST:    ---------------------------------------------------------------------------------------  Care Time Delivered:   Upon my evaluation, this patient had a high probability of imminent or life-threatening deterioration due to multifactorial shock, acidosis, acute hypercarbic respiratory failure, which required my direct attention, intervention, and personal management  I have personally provided 46 minutes (0000 to 0430) of critical care time, exclusive of procedures, teaching, family meetings, and any prior time recorded by providers other than myself  Melyssa Aguiar PA-C      Portions of the record may have been created with voice recognition software  Occasional wrong word or "sound a like" substitutions may have occurred due to the inherent limitations of voice recognition software    Read the chart carefully and recognize, using context, where substitutions have occurred

## 2022-02-19 NOTE — ASSESSMENT & PLAN NOTE
· First diagnosed in december and underwent IR procedure   · Slightly increased in size  · Received 3 unit PRBC  · Surgery consulted and signed off - no intervention at this time  · If patient continus to bleed may require repeat IR intervention  · Consider engaging surgery regarding increased abdominal pressure which may be contributing to ventilator needs   However this is likely multifactorial in setting of anasarca 2/2 volume overload in addition to abdominal wall hematoma and ileus

## 2022-02-19 NOTE — PROCEDURES
NEPHROLOGY DIALYSIS PROCEDURE NOTE      Patient seen and examined on CVVH, tolerating procedure well  All documentation, labs, medications were reviewed by myself, and the treatment plan was reviewed with nurse and patient  Seen on Dialysis at : 7:34 AM  Dialysis Access: Right IJ non tunneled dialysis catheter  Vitals: 98/54, heart rate 98, saturating 98% on the ventilator  Events:  No issues with CVVH overnight, continues to run even, did have decreasing pressor requirements yesterday but now back up to 3 pressors, remains on pre filter heparin  CVVH: Therapy Fluid: 3 5 L/hr; Ultrafiltration:  Running even to allow better blood pressure and to wean off pressors ;  Blood Flow Rate: 250 mL/min; Dialysate: 2K/3Ca     SUMMARY:     56 yo man with PMH of CKD G5, CHF, anemia, lymphedema, STEFF, cirrhosis, AFib presents to [facility] after mechanical fall, found to be fluid overload   Nephrology was consulted for MARISABEL and volume overload   Patient transferred to [facility] for further management      Patient had cardiac arrest on February 15th      Assessment/Plan:     Acute renal failure/acute tubular necrosis  --presumed to be secondary to cardiorenal syndrome with underlying acute tubular necrosis and hypotension and contrast associated nephropathy with underlying chronic kidney disease  --anuric, Gann catheter in place --> no objections discontinue the Gann catheter, his urine output at this point remains poor  --remains intubated and for pressors for blood pressure support  --continue CVVH, currently running even, aim to reduce pressure requirements and maintain the blood pressure  --continue pre filter heparin     Cardiac arrest/PEA  --February 15th  --received 2 rounds of CPR, epinephrine, and bicarbonate --> ROSC  --ventilatory dependent respiratory failure, currently intubated on 70% FiO2 with a concern of ARDS  --remains pressor dependent, norepinephrine, epinephrine, and vasopressin  --continue CVVH  --overall prognosis is guarded  --code status now DNR     Respiratory acidosis  --intubated, vent management as per critical care  --concern for ARDS  --pCO2 trending slightly back  --pH 7   249     Anemia  - hemoglobin yesterday 6 6, transfused 1 unit  - transfuse if hemoglobin less than 7  - history of an abdominal wall hematoma closely monitor  - check repeat CBC today     Hyponatremia--resolved  - volume mediated     Blood pressure/volume status  - hypotension, with underlying cardiorenal syndrome  - now currently on 3 pressors  -currently on CVVH, running even  -colloid IV albumin also added     Hyperphosphatemia  --resolved on CVVH  --monitor with CVVH  --electrolyte replacement protocol     Chronic kidney disease stage IIIB  --appears to have had some underlying progression of disease  --prior baseline creatinine 1 6-1 9 mg/dL  --outpatient nephrologist Dr Arthur     Urinary retention  --Gann catheter in place  --can discontinue Gann catheter     History of trauma to the abdominal wall resulting in hematoma  --status post IR pelvic angiogram on 12/4/2021 status post embolization  --recent CT scan reports mild increase in the size hematoma     Acute on chronic diastolic congestive heart failure  --with dilated cardiomyopathy and cirrhosis  --poor response to diuretic  --poor urine output  --now on CVVH  --currently running even at this time    Review of Systems:  Unable to obtain review systems as patient is intubated and sedated    Physical Exam:    General:  Critically ill, intubated and sedated  Skin:  Poor skin turgor  CVS:  S1-S2 appreciated irregular  Lungs:  Coarse breath sounds with rales  Abdomen:  Distended, firm, no guarding  Access:  Right IJ non tunneled dialysis catheter with no erythema or exudate  Extremities:  Massive edema  Neuro:  Sedated on the ventilator          Current Facility-Administered Medications:     acetaminophen (TYLENOL) tablet 650 mg, 650 mg, Oral, Q6H PRN, Mai Blum CRNP    albumin human (FLEXBUMIN) 25 % injection 25 g, 25 g, Intravenous, Q6H, Chika Webb, TRES, 25 g at 22 0448    albuterol inhalation solution 2 5 mg, 2 5 mg, Nebulization, Q6H PRN, Malinda Lopez, TRES, 2 5 mg at 22 0316    [] amiodarone (CORDARONE) 900 mg in dextrose 5 % 500 mL infusion, 1 mg/min, Intravenous, Continuous, Stopped at 22 2132 **FOLLOWED BY** amiodarone (CORDARONE) 900 mg in dextrose 5 % 500 mL infusion, 0 5 mg/min, Intravenous, Continuous, TRES Evangelista, Last Rate: 16 7 mL/hr at 22 0500, 0 5 mg/min at 22 0500    cisatracurium (NIMBEX) 200 mg in sodium chloride 0 9 % 500 mL infusion, 0 1-5 mcg/kg/min, Intravenous, Titrated, Stopped at 22 1530 **AND** artificial tear (LUBRIFRESH P M ) ophthalmic ointment, , Both Eyes, Q2H, Rodney Andreea, CRNP, Given at 22 0743    cefepime (MAXIPIME) 2,000 mg in dextrose 5 % 50 mL IVPB, 2,000 mg, Intravenous, Q12H, TRES Lagunas, Last Rate: 100 mL/hr at 227, 2,000 mg at 22    chlorhexidine (PERIDEX) 0 12 % oral rinse 15 mL, 15 mL, Mouth/Throat, Q12H [facility], TRES Lagunas, 15 mL at 22    digoxin (LANOXIN) injection 62 5 mcg, 62 5 mcg, Intravenous, Every Other Day, Phyllistine Aloe, ERNANP    EPINEPHrine 6000 mcg (DOUBLE CONCENTRATION) IV in sodium chloride 0 9% 250 mL, 1-20 mcg/min, Intravenous, Titrated, TRES Evangelista, Last Rate: 17 5 mL/hr at 22 0500, 7 mcg/min at 22 0500    heparin (porcine) 25,000 units in 0 45% in sodium chloride 250 ml infusion (CMPD), 500 Units/hr, Pre Filter, Continuous, Germán Abernathy MD, Last Rate: 5 mL/hr at 22 0500, 500 Units/hr at 22 0500    heparin (porcine) subcutaneous injection 7,500 Units, 7,500 Units, Subcutaneous, Q8H [facility], Naima Pruitt PA-C, 7,500 Units at 22 0641    HYDROmorphone (DILAUDID) 50 mg in sodium chloride 0 9% 50mL drip, 1 mg/hr, Intravenous, Continuous, Cr Jefferson PA-C, Last Rate: 1 mL/hr at 02/19/22 0500, 1 mg/hr at 02/19/22 0500    HYDROmorphone (DILAUDID) injection 1 mg, 1 mg, Intravenous, Q4H PRN, Cr Jefferson PA-C, 1 mg at 02/19/22 0223    influenza vaccine, recombinant, quadrivalent (FLUBLOK) IM injection 0 5 mL, 0 5 mL, Intramuscular, Once, Pa Boyd MD    lactulose [facility]) oral solution 20 g, 20 g, Oral, TID, JoseTRES Ribera, 20 g at 02/18/22 2157    levalbuterol [facility]) inhalation solution 1 25 mg, 1 25 mg, Nebulization, TID, TRES Lagunas, 1 25 mg at 02/18/22 1924    norepinephrine (LEVOPHED) 8 mg (DOUBLE CONCENTRATION) IV in sodium chloride 0 9% 250 mL, 1-100 mcg/min, Intravenous, Titrated, TRES Napier, Last Rate: 168 8 mL/hr at 02/19/22 0707, 90 mcg/min at 02/19/22 0707    NxStage K 4/Ca 3 dialysis solution (RFP-401) 20,000 mL, 20,000 mL, Dialysis, Continuous, Hoa Hughes MD, 20,000 mL at 02/19/22 0544    pantoprazole (PROTONIX) injection 40 mg, 40 mg, Intravenous, Q24H [address], TRES Lagunas, 40 mg at 02/18/22 0858    polyethylene glycol (MIRALAX) packet 17 g, 17 g, Oral, BID, TRES Napier, 17 g at 02/18/22 2151    senna-docusate sodium (SENOKOT S) 8 6-50 mg per tablet 2 tablet, 2 tablet, Oral, BID, TRES Echeverria, 2 tablet at 02/18/22 2200    sodium chloride 3 % inhalation solution 4 mL, 4 mL, Nebulization, Q6H, TRES Lagunas, 4 mL at 02/19/22 0316    sodium phosphate 6 mmol in sodium chloride 0 9 % 100 mL Infusion, 6 mmol, Intravenous, Once, Pa Boyd MD, Last Rate: 50 mL/hr at 02/19/22 0708, 6 mmol at 02/19/22 0708    vancomycin (VANCOCIN) 1,750 mg in sodium chloride 0 9 % 500 mL IVPB, 15 mg/kg (Adjusted), Intravenous, Q24H, Alexander Terrazas MD, Last Rate: 250 mL/hr at 02/18/22 1333, 1,750 mg at 02/18/22 1333    vasopressin (PITRESSIN) 20 Units in sodium chloride 0 9 % 100 mL infusion, 0 04 Units/min, Intravenous, Continuous, TRES Lagunas, Last Rate: 12 mL/hr at 02/19/22 0700, 0 04 Units/min at 02/19/22 0700

## 2022-02-19 NOTE — ASSESSMENT & PLAN NOTE
· Outpatient regimen consists of: metoprolol + coumadin   · Coumadin held due to abdominal wall hematoma and anemia, continue to hold  · Rates into 170's on 2/16 requiring Amio bolus x 2 and then initiated on continuous infusion  · Continue amio infusion 0 5  · Digoxin 62 5mg QoD, last level 1 6, consider rechecking prior to next dose

## 2022-02-19 NOTE — ASSESSMENT & PLAN NOTE
· Due to abdominal wall hematoma  · s/p 4 units PRBC, last transfusion on 2/19  · Trend H&H QD likely recent drop is secondary to frequently phlebotomy and volume overload, no obvious source of bleeding at this time

## 2022-02-19 NOTE — ASSESSMENT & PLAN NOTE
Lab Results   Component Value Date    EGFR 56 02/18/2022    EGFR 54 02/18/2022    EGFR 51 02/18/2022    CREATININE 1 33 (H) 02/18/2022    CREATININE 1 38 (H) 02/18/2022    CREATININE 1 43 (H) 02/18/2022     · Patient admitted to [place] [address] due to mechanical fall and volume overload  · Patient on lasix 80 BID, zaroxolyn and midodrine prior to admission  · Nephrology was consulted and the patient was trialed on a lasix infusion which was transitioned to a bumex infusion  · After he failed to make progress with diuresis;  CVVH was started  · Continue CVVH running even for now

## 2022-02-19 NOTE — ASSESSMENT & PLAN NOTE
· OGT to low intermittent suction   · Continue bowel regiment   · Monitor for return of bowel function

## 2022-02-19 NOTE — ASSESSMENT & PLAN NOTE
· Possibly related to prior alcohol abuse vs vascular congestion 2/2 volume overload  · Trend LFTs   · Outpatient follow up with GI

## 2022-02-19 NOTE — ASSESSMENT & PLAN NOTE
· Multifactorial in setting of cardiac arrest, some cardiogenic component as evidenced by decreased EF on ECHO despite inotropic medications, and septic shock which is likely the largest component   · Septic component   · Continue broad spectrum ABX cefepime/vancomycin (days 5 and 8 respectively) through 2/21 for 10 days total   · Levophed 100, Vaso 0 04  · Wean as able for MAP > 65  · Cardiogenic component   · Epi 7  · Continue for cardiogenic support and also BP support, if able to wean down on levophed, would consider weaning down to lower levels  · ECHO revealed EF of 43%, mild to moderate global hypokinesis and pulmonary artery systolic pressure of 54 mmhg  IVC dilated, no retrophasic changes   · Would likely benefit from some volume removal however pressure is not tolerating that at this time

## 2022-02-20 NOTE — ASSESSMENT & PLAN NOTE
Wt Readings from Last 3 Encounters:   02/19/22 (!) 168 kg (370 lb 13 oz)   02/12/22 (!) 170 kg (374 lb 12 5 oz)   12/29/21 (!) 152 kg (336 lb)     · Patient noted to be volume overload with a increase in his weight of 20 kg since December and anasarca  · Continue CVVH with volume removal  · Continue epinephrine for ionotropic support

## 2022-02-20 NOTE — ASSESSMENT & PLAN NOTE
· OGT to low intermittent suction   · Continue bowel regiment   · Monitor for return of bowel function  · Did not tolerate trial of trickle TF

## 2022-02-20 NOTE — ASSESSMENT & PLAN NOTE
Lab Results   Component Value Date    EGFR 65 02/20/2022    EGFR 65 02/19/2022    EGFR 59 02/19/2022    CREATININE 1 18 02/20/2022    CREATININE 1 17 02/19/2022    CREATININE 1 27 02/19/2022     · Patient admitted to [place] [address] due to mechanical fall and volume overload  · Patient on lasix 80 BID, zaroxolyn and midodrine prior to admission  · Nephrology was consulted and the patient was trialed on a lasix infusion which was transitioned to a bumex infusion  · After he failed to make progress with diuresis;  CVVH was started  · Continue CVVH running -50cc/hr

## 2022-02-20 NOTE — PROCEDURES
NEPHROLOGY DIALYSIS PROCEDURE NOTE      Patient seen and examined on CVVH, tolerating procedure well  All documentation, labs, medications were reviewed by myself, and the treatment plan was reviewed with nurse and patient       Seen on Dialysis at : 8:41 AM  Dialysis Access: Right IJ non tunneled dialysis catheter  Vitals: 79/44, heart rate 96, saturating 98% on 60% FIO2 on ventilator  Events:  No issues with CVVH overnight, continues to run even,  remains on 3 pressors, along with pre filter heparin  CVVH: Therapy Fluid: 3 5 L/hr; Ultrafiltration:  Running to run even to allow better blood pressure and to wean off pressors, with some alternating periods of ultrafiltration ; Blood Flow Rate: 250 mL/min; Dialysate: 2K/3Ca     SUMMARY:     58 yo man with PMH of CKD G5, CHF, anemia, lymphedema, STEFF, cirrhosis, AFib presents to [facility] after mechanical fall, found to be fluid overload   Nephrology was consulted for MARISABEL and volume overload   Patient transferred to [facility] for further management      Patient had cardiac arrest on February 15th      Assessment/Plan:     Acute renal failure/acute tubular necrosis  --presumed to be secondary to cardiorenal syndrome with underlying acute tubular necrosis and hypotension and contrast associated nephropathy with underlying chronic kidney disease  --anuric, can discontinue Gann catheter  --remains intubated and for pressors for blood pressure support  --continue CVVH, reordered, discussed with the nurse, ultrafiltration as blood pressure allows  --continue pre filter heparin     Cardiac arrest/PEA  --February 15th  --received 2 rounds of CPR, epinephrine, and bicarbonate --> ROSC  --ventilatory dependent respiratory failure, currently intubated on 60% FiO2   --remains pressor dependent, norepinephrine, epinephrine, and vasopressin  --continue CVVH, running even and alternating gentle ultrafiltration  --overall prognosis is guarded  --code status now DNR     Respiratory acidosis  --intubated, vent management as per critical care  --concern for ARDS  --pCO2 trending slightly back up  --pH 7 240     Anemia  -  hemoglobin continues to again dropped now at 6 2, currently receiving another unit of blood transfusion  - transfuse if hemoglobin less than 7  - history of an abdominal wall hematoma closely monitor  - closely monitor hemoglobin and hematocrit, if there continues to pre of precipitous drop may need to rescanning, possibly hold heparin any concerns bleeding     Hyponatremia--resolved  - volume mediated     Blood pressure/volume status  - hypotension, with underlying cardiorenal syndrome  - now currently on 3 pressors  -currently on CVVH, running even  -colloid IV albumin also added  -echocardiogram showed dilated IVC consistent with volume overload     Hyperphosphatemia  --resolved on CVVH  --monitor with CVVH  --electrolyte replacement protocol     Chronic kidney disease stage IIIB  --appears to have had some underlying progression of disease  --prior baseline creatinine 1 6-1 9 mg/dL  --outpatient nephrologist Dr Arthur  --likely will not return to his prior baseline creatinine given this degree of injury     Urinary retention  --Gann catheter in place  --can discontinue Gann catheter     History of trauma to the abdominal wall resulting in hematoma  --status post IR pelvic angiogram on 12/4/2021 status post embolization  --recent CT scan reports mild increase in the size hematoma     Acute on chronic diastolic congestive heart failure  --with dilated cardiomyopathy and cirrhosis  --poor response to diuretic  --poor urine output  --now on CVVH  --currently running even at this time    Review of Systems:  Unable to obtain review of systems as the patient is intubated and sedated    Physical Exam:    General:  Intubated and sedated and critically ill on the ventilator  Skin:  Poor skin turgor  CVS:  S1-S2 appreciated irregular rhythm  Lungs:  Coarse breath sounds with rales  Abdomen:  Obese, distended abdomen, no guarding no tenderness  Access:  Right IJ non tunneled dialysis catheter with no exudate  Extremities:  Massive edema  Neuro:  Sedated on the ventilator          Current Facility-Administered Medications:     acetaminophen (TYLENOL) tablet 650 mg, 650 mg, Oral, Q6H PRN, Ace Amarilys, CRNP    albumin human (FLEXBUMIN) 25 % injection 25 g, 25 g, Intravenous, Q6H, TRES Neves, Last Rate: 100 mL/hr at 22 0439, 25 g at 22 0439    albuterol inhalation solution 2 5 mg, 2 5 mg, Nebulization, Q6H PRN, Ace Amarilys, ERNANP, 2 5 mg at 22 0259    [] amiodarone (CORDARONE) 900 mg in dextrose 5 % 500 mL infusion, 1 mg/min, Intravenous, Continuous, Stopped at 22 **FOLLOWED BY** amiodarone (CORDARONE) 900 mg in dextrose 5 % 500 mL infusion, 0 5 mg/min, Intravenous, Continuous, TRES Neves, Last Rate: 16 7 mL/hr at 22 0600, 0 5 mg/min at 22 0600    cefepime (MAXIPIME) 2,000 mg in dextrose 5 % 50 mL IVPB, 2,000 mg, Intravenous, Q12H, TRES Lagunas, Last Rate: 100 mL/hr at 224, 2,000 mg at 224    chlorhexidine (PERIDEX) 0 12 % oral rinse 15 mL, 15 mL, Mouth/Throat, Q12H [address], TRES Lagunas, 15 mL at 22 0818    digoxin (LANOXIN) injection 62 5 mcg, 62 5 mcg, Intravenous, Every Other Day, TRES Gonzalez, 62 5 mcg at 22 0848    EPINEPHrine 6000 mcg (DOUBLE CONCENTRATION) IV in sodium chloride 0 9% 250 mL, 1-20 mcg/min, Intravenous, Titrated, TRES Neves, Last Rate: 25 mL/hr at 22 0600, 10 mcg/min at 22 0600    heparin (porcine) 25,000 units in 0 45% in sodium chloride 250 ml infusion (CMPD), 500 Units/hr, Pre Filter, Continuous, Lydia Chua MD, Last Rate: 5 mL/hr at 22 0700, 500 Units/hr at 22 0700    heparin (porcine) subcutaneous injection 7,500 Units, 7,500 Units, Subcutaneous, Q8H [address], Naima Pruitt PA-C, 7,500 Units at 02/20/22 0522    HYDROmorphone (DILAUDID) 50 mg in sodium chloride 0 9% 50mL drip, 2 mg/hr, Intravenous, Continuous, Marlo Jefferson PA-C, Last Rate: 2 mL/hr at 02/20/22 0600, 2 mg/hr at 02/20/22 0600    HYDROmorphone (DILAUDID) injection 1 mg, 1 mg, Intravenous, Q2H PRN, Marlo Jefferson PA-C, 1 mg at 02/19/22 2143    influenza vaccine, recombinant, quadrivalent (FLUBLOK) IM injection 0 5 mL, 0 5 mL, Intramuscular, Once, Kate Mendoza MD    [facility]) oral solution 20 g, 20 g, Oral, TID, TRES Calloway, 20 g at 02/20/22 0818    levalbuterol [facility]) inhalation solution 1 25 mg, 1 25 mg, Nebulization, TID, TRES Lagunas, 1 25 mg at 02/20/22 0750    midazolam (VERSED) injection 2 mg, 2 mg, Intravenous, Once, Neil Mckenzie PA-C    norepinephrine (LEVOPHED) 8 mg (DOUBLE CONCENTRATION) IV in sodium chloride 0 9% 250 mL, 1-100 mcg/min, Intravenous, Titrated, Mardi Foots SondayTRES, Last Rate: 168 8 mL/hr at 02/20/22 0713, 90 mcg/min at 02/20/22 0713    NxStage K 4/Ca 3 dialysis solution (RFP-401) 20,000 mL, 20,000 mL, Dialysis, Continuous, Lynne Vera MD, 20,000 mL at 02/20/22 0819    pantoprazole (PROTONIX) injection 40 mg, 40 mg, Intravenous, Q24H [address], TRES Lagunas, 40 mg at 02/20/22 0818    polyethylene glycol (MIRALAX) packet 17 g, 17 g, Oral, BID, Mardi Foots Sonday, ERNANP, 17 g at 02/20/22 0818    senna-docusate sodium (SENOKOT S) 8 6-50 mg per tablet 2 tablet, 2 tablet, Oral, BID, TRES Hernandez, 2 tablet at 02/20/22 0818    sodium chloride 3 % inhalation solution 4 mL, 4 mL, Nebulization, Q6H, TRES Lagunas, 4 mL at 02/20/22 0750    vancomycin (VANCOCIN) 1,750 mg in sodium chloride 0 9 % 500 mL IVPB, 15 mg/kg (Adjusted), Intravenous, Q24H, Ester Carrera MD, Last Rate: 250 mL/hr at 02/19/22 1341, 1,750 mg at 02/19/22 1341    vasopressin (PITRESSIN) 20 Units in sodium chloride 0 9 % 100 mL infusion, 0 04 Units/min, Intravenous, Continuous, TRES Lagunas, Last Rate: 12 mL/hr at 02/20/22 0714, 0 04 Units/min at 02/20/22 9138

## 2022-02-20 NOTE — PROGRESS NOTES
[address] [facility]  Progress Note - Janie Remy 1960, 60 y o  male MRN: [id number]  Unit/Bed#: ICU 02 Encounter: [id number]  Primary Care Provider: Perry Alcantar DO   Date and time admitted to hospital: 2/12/2022  6:51 PM    Shock [facility])  Assessment & Plan  · Multifactorial in setting of cardiac arrest, some cardiogenic component as evidenced by decreased EF on ECHO despite inotropic medications, and septic shock which is likely the largest component   · Septic component   · Continue broad spectrum ABX cefepime/vancomycin (days 5 and 8 respectively) through 2/21 for 10 days total   · Levophed 80, Vaso 0 04  · Wean as able for MAP > 65  · Cardiogenic component   · Epi 10  · Continue for cardiogenic support and also BP support, if able to wean down on levophed, would consider weaning down to lower levels  · ECHO revealed EF of 43%, mild to moderate global hypokinesis and pulmonary artery systolic pressure of 54 mmhg  IVC dilated, no retrophasic changes   · Would likely benefit from volume removal however this is limited by hemodynamics       Cardiac arrest [facility])  Assessment & Plan  · Patient PEA cardiac arrest on 2/15  · Etiology secondary to aspiration hypoxia, downtime approximately 12 minutes   · Trend neuro exam as appropriate  · Once of sedation/paralytics need to evaluate his mental status for anoxic injury  · Following the arrest he was following commands, however, he continued to have significant refractory hypoxia which may have lead to secondary injury       Acute respiratory failure with hypoxia and hypercapnia ([facility] [address] )  Assessment & Plan  · Patient with increasing oxygen requirements on 2/15 after episodes of vomiting, he became increasingly lethargic and unable to clear secretions   He was intubated with several aspiration occurrences; as patient was continuously vomiting   · 2/15 and 2/16 bronchoscopy for clearance of excessive secretions   · Mechanical ventilation  · 35/400/60/5  · Goal SpO2> 88%  · IBW TV 450cc for 6cc/kg however peak pressure 40-45 and Pplat 30 limiting increase  · VAP bundle ordered  · Continue nebs  · Continue to trend ABG's  · Continue dilaudid 2 mg/hr for goal of deep sedation   · Veletri discontinued      * Acute renal failure superimposed on stage 3b chronic kidney disease [facility])  Assessment & Plan  Lab Results   Component Value Date    EGFR 65 02/20/2022    EGFR 65 02/19/2022    EGFR 59 02/19/2022    CREATININE 1 18 02/20/2022    CREATININE 1 17 02/19/2022    CREATININE 1 27 02/19/2022     · Patient admitted to [place] [id number] due to mechanical fall and volume overload  · Patient on lasix 80 BID, zaroxolyn and midodrine prior to admission  · Nephrology was consulted and the patient was trialed on a lasix infusion which was transitioned to a bumex infusion  · After he failed to make progress with diuresis;  CVVH was started  · Continue CVVH running -50cc/hr    Permanent atrial fibrillation (HCC)  Assessment & Plan  · Outpatient regimen consists of: metoprolol + coumadin   · Coumadin held due to abdominal wall hematoma and anemia, continue to hold  · Rates into 170's on 2/16 requiring Amio bolus x 2 and then initiated on continuous infusion  · Continue amio infusion 0 5  · Digoxin 62 5mg QoD, last level 1 6, recheck prior to next dose tomorrow      Acute on chronic diastolic (congestive) heart failure (HCC)  Assessment & Plan  Wt Readings from Last 3 Encounters:   02/19/22 (!) 168 kg (370 lb 13 oz)   02/12/22 (!) 170 kg (374 lb 12 5 oz)   12/29/21 (!) 152 kg (336 lb)     · Patient noted to be volume overload with a increase in his weight of 20 kg since December and anasarca  · Continue CVVH with volume removal  · Continue epinephrine for ionotropic support     Acute on chronic anemia  Assessment & Plan  · Due to abdominal wall hematoma  · s/p 4 units PRBC, last transfusion on 2/19  · Trend H&H QD likely recent drop is secondary to frequently phlebotomy and volume overload, no obvious source of bleeding at this time       Abdominal wall hematoma  Assessment & Plan  · First diagnosed in december and underwent IR procedure   · Slightly increased in size  · Received 3 unit PRBC  · Surgery consulted and signed off - no intervention at this time  · If patient continus to bleed may require repeat IR intervention  · Consider engaging surgery regarding increased abdominal pressure which may be contributing to ventilator needs  However this is likely multifactorial in setting of anasarca 2/2 volume overload in addition to abdominal wall hematoma and ileus     Lymphedema  Assessment & Plan  · Chronic due to venous insufficiency and volume overload    STEFF (obstructive sleep apnea)  Assessment & Plan  · Currently ventilated  · BIPAP as needed when extubated     Other cirrhosis of liver (HCC)  Assessment & Plan  · Possibly related to prior alcohol abuse vs vascular congestion 2/2 volume overload  · Trend LFTs   · Outpatient follow up with GI    Urinary retention  Assessment & Plan  · Retrograde urethra gram and fluoroscopically guided urethral catheter placement by IR  · Maintain leahy catheter     Ileus (HCC)  Assessment & Plan  · OGT to low intermittent suction   · Continue bowel regiment   · Monitor for return of bowel function  · Did not tolerate trial of trickle TF      ----------------------------------------------------------------------------------------  HPI/24hr events: Remains on high dose pressors, epi increased to 10 for goal of weaning levophed  Levophed has been weaned minimally from 100 to [de-identified]  Central venous catheter placed 2/2 lack of IV access  CVVH running -50cc/hr for volume removal   Dilaudid infusion increased to 2mg/hr due to continued agitation  PEEP decreased to 5 to help with peak/plateau pressures       Patient appropriate for transfer out of the ICU today?: No  Disposition: Continue Critical Care   Code Status: Level 2 - DNAR: but accepts endotracheal intubation  ---------------------------------------------------------------------------------------  SUBJECTIVE  Unable to provide    Review of Systems  Review of systems was unable to be performed secondary to intubated  ---------------------------------------------------------------------------------------  OBJECTIVE    Vitals   Vitals:    22 1926 22 2100 22 2200 22 0000   BP:       Pulse: 102  94 96   Resp: (!) 35  (!) 28 (!) 35   Temp: 98 2 °F (36 8 °C) 97 9 °F (36 6 °C) 97 9 °F (36 6 °C) 98 2 °F (36 8 °C)   TempSrc: Esophageal      SpO2: 93%  98% 99%   Weight:       Height:         Temp (24hrs), Av 1 °F (36 7 °C), Min:97 9 °F (36 6 °C), Max:98 6 °F (37 °C)  Current: Temperature: 98 2 °F (36 8 °C)  Arterial Line BP: 96/54  Arterial Line MAP (mmHg): 68 mmHg    Respiratory:    Invasive/non-invasive ventilation settings   Respiratory  Report   Lab Data (Last 4 hours)       0409            pH, Arterial       7 240             pCO2, Arterial       55 8             pO2, Arterial       78 3             HCO3, Arterial       23 4             Base Excess, Arterial       -4 0                  O2/Vent Data        025   Most Recent         Vent Mode AC/VC  AC/VC      Resp Rate (BPM) (BPM) 35  35      Vt (mL) (mL) 400  400      FIO2 (%) (%) 60  60      PEEP (cmH2O) (cmH2O) 6  6      MV 14 7  14 7                  Physical Exam  Constitutional:       Appearance: He is ill-appearing and toxic-appearing  He is not diaphoretic  HENT:      Head: Normocephalic  Mouth/Throat:      Mouth: Mucous membranes are moist    Eyes:      Pupils: Pupils are equal, round, and reactive to light  Cardiovascular:      Rate and Rhythm: Tachycardia present  Rhythm irregular  Heart sounds: No murmur heard  No friction rub  No gallop      Pulmonary:      Comments: Diminished b/l breath sounds  Tachypneic   Abdominal:      Comments: Firm  Pitting edema throughout  Distended Unclear if tender to palpation 2/2 sedation    Musculoskeletal:      Comments: Anasarca    Skin:     General: Skin is warm and dry  Comments: Weeping skin    Neurological:      Comments: Examined on sedation, will attempt to open eyes to voice  Spontaneously will move b/l lower extremities             Laboratory and Diagnostics:  Results from last 7 days   Lab Units 02/19/22  1851 02/19/22  1826 02/19/22  0755 02/18/22  0423 02/17/22  0726 02/16/22  1222 02/15/22  2317 02/15/22  0612 02/15/22  0612 02/14/22  0531 02/13/22  0534   WBC Thousand/uL 27 51*  --  31 37* 31 31* 29 10* 8 40 13 52*  --  6 97   < > 6 38   HEMOGLOBIN g/dL 7 0* 7 0* 6 7* 6 6* 7 4* 8 1* 7 8*   < > 7 4*   < > 7 1*   HEMATOCRIT % 23 8*  --  23 2* 22 8* 25 2* 28 2* 27 5*  --  25 0*   < > 23 2*   PLATELETS Thousands/uL 33*  --  38* 47* 67* 128* 115*  --  100*   < > 165   NEUTROS PCT %  --   --   --   --   --   --  87*  --  80*  --  79*   BANDS PCT %  --   --  4  --  43* 40*  --   --   --   --   --    MONOS PCT %  --   --   --   --   --   --  4  --  9  --  8   MONO PCT %  --   --  4  --  9 4  --   --   --   --   --     < > = values in this interval not displayed       Results from last 7 days   Lab Units 02/20/22  0406 02/20/22  0001 02/19/22  1826 02/19/22  1151 02/19/22  0515 02/18/22  2358 02/18/22  1753 02/16/22  2051 02/16/22  1222 02/16/22  0236 02/15/22  2317 02/15/22  1821 02/15/22  1157 02/13/22  2300 02/13/22  0534   SODIUM mmol/L 137 138 138 137 137 137 137   < > 131*   < > 131*   < > 129*   < > 128*   POTASSIUM mmol/L 4 1 4 2 4 7 3 9 4 2 4 4 4 5   < > 3 3*   < > 4 1   < > 3 7   < > 4 1   CHLORIDE mmol/L 104 104 104 102 102 102 102   < > 98*   < > 97*   < > 94*   < > 87*   CO2 mmol/L 26 24 24 25 24 22 24   < > 24   < > 29   < > 30   < > 32   ANION GAP mmol/L 7 10 10 10 11 13 11   < > 9   < > 5   < > 5   < > 9   BUN mg/dL 16 17 19 19 22 21 20   < > 33*   < > 33*   < > 39*   < > 92*   CREATININE mg/dL 1 15 1 18 1 17 1 27 1 30 1 31* 1 33*   < > 1 77*   < > 1 74*   < > 1 78*   < > 3 30*   CALCIUM mg/dL 9 1 8 7 8 7 8 9 8 9 8 6 8 5   < > 8 1*   < > 9 7   < > 8 9   < > 8 5   GLUCOSE RANDOM mg/dL 92 89 92 104 99 101 96   < > 116   < > 139   < > 109   < > 122   ALT U/L  --   --   --   --   --   --   --   --  11*  --  18  --  14  --  15   AST U/L  --   --   --   --   --   --   --   --  35  --  30  --  27  --  23   ALK PHOS U/L  --   --   --   --   --   --   --   --  84  --  119*  --  125*  --  119*   ALBUMIN g/dL  --   --   --   --   --   --   --   --  2 4*  --  2 7*  --  2 4*  --  2 1*   TOTAL BILIRUBIN mg/dL  --   --   --   --   --   --   --   --  2 40*  --  2 21*  --  1 54*  --  0 93    < > = values in this interval not displayed  Results from last 7 days   Lab Units 02/20/22  0406 02/20/22  0001 02/19/22  1826 02/19/22  1151 02/19/22  0515 02/18/22  2358 02/18/22  1753   MAGNESIUM mg/dL 2 1 1 9 2 0 1 9 2 0 2 0 2 1   PHOSPHORUS mg/dL 2 0* 1 8* 2 0* 1 9* 2 1* 1 8* 2 5      Results from last 7 days   Lab Units 02/18/22  0423 02/16/22  1351 02/14/22  0531   INR  2 02* 1 40* 1 22*   PTT seconds 66*  --   --           Results from last 7 days   Lab Units 02/18/22  2358 02/18/22  0423 02/17/22  1620 02/17/22  1154 02/17/22  0449 02/16/22  2355 02/16/22  1838   LACTIC ACID mmol/L 1 9 1 7 2 3* 2 8* 4 1* 5 1* 5 1*     ABG:  Results from last 7 days   Lab Units 02/20/22  0409   PH ART  7 240*   PCO2 ART mm Hg 55 8*   PO2 ART mm Hg 78 3   HCO3 ART mmol/L 23 4   BASE EXC ART mmol/L -4 0   ABG SOURCE  Line, Arterial     VBG:  Results from last 7 days   Lab Units 02/20/22  0409 02/19/22 2224 02/19/22 2219   PH ALISIA   --  7 262*  --    PCO2 ALISIA mm Hg  --  51 0*  --    PO2 ALISIA mm Hg  --  62 8*  --    HCO3 ALISIA mmol/L  --  22 5*  --    BASE EXC ALISIA mmol/L  --  -4 4  --    ABG SOURCE  Line, Arterial  --    < >    < > = values in this interval not displayed       Results from last 7 days   Lab Units 02/19/22  0512 02/18/22  0423 02/17/22  0449 02/16/22  1222 02/14/22  0531 02/13/22  2300   PROCALCITONIN ng/ml 64 33* 97 02* 92 31* 27 51* 0 64* 0 79*       Micro  Results from last 7 days   Lab Units 02/17/22  1428 02/17/22  1223 02/16/22  0939   BLOOD CULTURE  No Growth at 48 hrs  No Growth at 48 hrs   --    GRAM STAIN RESULT   --   --  No Polys or Bacteria seen       EKG: no new  Imaging: CXR: rotated, poor film  ETT 2cm from elton, tubes in appropriate position no obvious PTX    Intake and Output  I/O       02/18 0701 02/19 0700 02/19 0701 02/20 0700    I V  (mL/kg) 6087 5 (36 2) 3425 8 (20 4)    Blood 350 350    NG/GT  120    IV Piggyback 340 520    Total Intake(mL/kg) 6777 5 (40 3) 4415 8 (26 3)    Urine (mL/kg/hr) 15 (0) 0 (0)    Emesis/NG output 100 700    Other 5710 5828    Total Output 5825 6528    Net +952 5 -2112 2                Height and Weights   Height: 5' 11" (180 3 cm)  IBW (Ideal Body Weight): 75 3 kg  Body mass index is 51 72 kg/m²  Weight (last 2 days)     Date/Time Weight    02/19/22 0600 168 (370 81)    02/19/22 0300 168 (370 81)    02/18/22 0532 171 (376 33)    02/18/22 0500 171 (376 33)            Nutrition       Diet Orders   (From admission, onward)             Start     Ordered    02/19/22 2128  Diet NPO  Diet effective now        References:    Nutrtion Support Algorithm Enteral vs  Parenteral   Question Answer Comment   Diet Type NPO    RD to adjust diet per protocol?  Yes        02/19/22 2127                  Active Medications  Scheduled Meds:  Current Facility-Administered Medications   Medication Dose Route Frequency Provider Last Rate    acetaminophen  650 mg Oral Q6H PRN Catherine Sleek, CRNP      albumin human  25 g Intravenous Q6H Tiney Ta Lucianoday, CRNP 25 g (02/20/22 0439)    albuterol  2 5 mg Nebulization Q6H PRN Catherine Sleek, CRNP      amiodarone  0 5 mg/min Intravenous Continuous Loyd Lucianoday, CRNP 0 5 mg/min (02/20/22 0400)    cefepime  2,000 mg Intravenous Q12H TRES Lagunas 2,000 mg (02/19/22 1548)  chlorhexidine  15 mL Mouth/Throat Q12H [address] TRES Graf      digoxin  62 5 mcg Intravenous Every Other Day TRES Barnes      epinephrine  1-20 mcg/min Intravenous Titrated Raynold Bellmawr Sonday, CRNP 10 mcg/min (02/20/22 0400)    heparin (porcine)  500 Units/hr Pre Filter Continuous Orville Malik  Units/hr (02/20/22 0400)    heparin (porcine)  7,500 Units Subcutaneous [facility] Naima Pruitt PA-C      HYDROmorphone  2 mg/hr Intravenous Continuous Kristyn Jefferson PA-C 2 mg/hr (02/20/22 0400)    HYDROmorphone  1 mg Intravenous Q2H PRN Kristyn Jefferson PA-C      influenza vaccine  0 5 mL Intramuscular Once Jodi Talamantes MD      lactulose  20 g Oral TID TRES Barnes      levalbuterol  1 25 mg Nebulization TID TRES Rivera      midazolam  2 mg Intravenous Once Kristyn Goddard [employer] [place]      norepinephrine  1-100 mcg/min Intravenous Titrated Raynold Loren OrdTRES 90 667 mcg/min (02/20/22 0435)   Alberto Lyons NxStage K 4/Ca 3  20,000 mL Dialysis Continuous Orville Malik MD      pantoprazole  40 mg Intravenous Q24H [address] TRES [place]      polyethylene glycol  17 g Oral BID Raynold Loren Sonday, TRES      senna-docusate sodium  2 tablet Oral BID TRES Randall      sodium chloride  4 mL Nebulization Q6H TRES Lagun[place]      vancomycin  15 mg/kg (Adjusted) Intravenous Q24H Lazara Hernandez MD 1,750 mg (02/19/22 1341)    vasopressin  0 04 Units/min Intravenous Continuous TRES Lagunas 0 04 Units/min (02/20/22 0400)     Continuous Infusions:  amiodarone, 0 5 mg/min, Last Rate: 0 5 mg/min (02/20/22 0400)  epinephrine, 1-20 mcg/min, Last Rate: 10 mcg/min (02/20/22 0400)  heparin (porcine), 500 Units/hr, Last Rate: 500 Units/hr (02/20/22 0400)  HYDROmorphone, 2 mg/hr, Last Rate: 2 mg/hr (02/20/22 0400)  norepinephrine, 1-100 mcg/min, Last Rate: 90 667 mcg/min (02/20/22 0435)  NxStage K 4/Ca 3, 20,000 mL  vasopressin, 0 04 Units/min, Last Rate: 0 04 Units/min (02/20/22 0400)      PRN Meds:   acetaminophen, 650 mg, Q6H PRN  albuterol, 2 5 mg, Q6H PRN  HYDROmorphone, 1 mg, Q2H PRN        Invasive Devices Review  Invasive Devices  Report    Central Venous Catheter Line            CVC Central Lines 02/19/22 Triple 20cm Left Internal jugular <1 day          Arterial Line            Arterial Line 02/16/22 Radial 4 days          Hemodialysis Catheter            HD Temporary Double Catheter 6 days          Drain            Urethral Catheter Temperature probe 16 Fr  2 days    NG/OG/Enteral Tube Orogastric 1 day          Airway            ETT  Cuffed;Oral 7 5 mm 4 days                  ---------------------------------------------------------------------------------------  Advance Directive and Living Will:      Power of :    POLST:    ---------------------------------------------------------------------------------------  Care Time Delivered:   No Critical Care time spent       Evangelist Wolfe PA-C      Portions of the record may have been created with voice recognition software  Occasional wrong word or "sound a like" substitutions may have occurred due to the inherent limitations of voice recognition software    Read the chart carefully and recognize, using context, where substitutions have occurred

## 2022-02-20 NOTE — PLAN OF CARE
Problem: Potential for Falls  Goal: Patient will remain free of falls  Description: INTERVENTIONS:  - Educate patient/family on patient safety including physical limitations  - Instruct patient to call for assistance with activity   - Consult OT/PT to assist with strengthening/mobility   - Keep Call bell within reach  - Keep bed low and locked with side rails adjusted as appropriate  - Keep care items and personal belongings within reach  - Initiate and maintain comfort rounds  - Make Fall Risk Sign visible to staff    - Apply yellow socks and bracelet for high fall risk patients  - Consider moving patient to room near nurses station  Outcome: Progressing     Problem: MOBILITY - ADULT  Goal: Maintain or return to baseline ADL function  Description: INTERVENTIONS:  -  Assess patient's ability to carry out ADLs; assess patient's baseline for ADL function and identify physical deficits which impact ability to perform ADLs (bathing, care of mouth/teeth, toileting, grooming, dressing, etc )  - Assess/evaluate cause of self-care deficits   - Assess range of motion  - Assess patient's mobility; develop plan if impaired  - Assess patient's need for assistive devices and provide as appropriate  - Encourage maximum independence but intervene and supervise when necessary  - Involve family in performance of ADLs  - Assess for home care needs following discharge   - Consider OT consult to assist with ADL evaluation and planning for discharge  - Provide patient education as appropriate  Outcome: Not Progressing  Goal: Maintains/Returns to pre admission functional level  Description: INTERVENTIONS:  - Perform BMAT or MOVE assessment daily    - Set and communicate daily mobility goal to care team and patient/family/caregiver     - Collaborate with rehabilitation services on mobility goals if consulted    - Out of bed for toileting  - Record patient progress and toleration of activity level   Outcome: Not Progressing     Problem: Prexisting or High Potential for Compromised Skin Integrity  Goal: Skin integrity is maintained or improved  Description: INTERVENTIONS:  - Identify patients at risk for skin breakdown  - Assess and monitor skin integrity  - Assess and monitor nutrition and hydration status  - Monitor labs   - Assess for incontinence   - Turn and reposition patient  - Assist with mobility/ambulation  - Relieve pressure over bony prominences  - Avoid friction and shearing  - Provide appropriate hygiene as needed including keeping skin clean and dry  - Evaluate need for skin moisturizer/barrier cream  - Collaborate with interdisciplinary team   - Patient/family teaching  - Consider wound care consult   Outcome: Progressing     Problem: METABOLIC, FLUID AND ELECTROLYTES - ADULT  Goal: Electrolytes maintained within normal limits  Description: INTERVENTIONS:  - Monitor labs and assess patient for signs and symptoms of electrolyte imbalances  - Administer electrolyte replacement as ordered  - Monitor response to electrolyte replacements, including repeat lab results as appropriate  - Instruct patient on fluid and nutrition as appropriate  Outcome: Not Progressing  Goal: Fluid balance maintained  Description: INTERVENTIONS:  - Monitor labs   - Monitor I/O and WT  - Instruct patient on fluid and nutrition as appropriate  - Assess for signs & symptoms of volume excess or deficit  Outcome: Not Progressing  Goal: Glucose maintained within target range  Description: INTERVENTIONS:  - Monitor Blood Glucose as ordered  - Assess for signs and symptoms of hyperglycemia and hypoglycemia  - Administer ordered medications to maintain glucose within target range  - Assess nutritional intake and initiate nutrition service referral as needed  Outcome: Not Progressing     Problem: CARDIOVASCULAR - ADULT  Goal: Maintains optimal cardiac output and hemodynamic stability  Description: INTERVENTIONS:  - Monitor I/O, vital signs and rhythm  - Monitor for S/S and trends of decreased cardiac output  - Administer and titrate ordered vasoactive medications to optimize hemodynamic stability  - Assess quality of pulses, skin color and temperature  - Assess for signs of decreased coronary artery perfusion  - Instruct patient to report change in severity of symptoms  Outcome: Progressing  Goal: Absence of cardiac dysrhythmias or at baseline rhythm  Description: INTERVENTIONS:  - Continuous cardiac monitoring, vital signs, obtain 12 lead EKG if ordered  - Administer antiarrhythmic and heart rate control medications as ordered  - Monitor electrolytes and administer replacement therapy as ordered  Outcome: Progressing     Problem: RESPIRATORY - ADULT  Goal: Achieves optimal ventilation and oxygenation  Description: INTERVENTIONS:  - Assess for changes in respiratory status  - Assess for changes in mentation and behavior  - Position to facilitate oxygenation and minimize respiratory effort  - Oxygen administered by appropriate delivery if ordered  - Initiate smoking cessation education as indicated  - Encourage broncho-pulmonary hygiene including cough, deep breathe, Incentive Spirometry  - Assess the need for suctioning and aspirate as needed  - Assess and instruct to report SOB or any respiratory difficulty  - Respiratory Therapy support as indicated  Outcome: Not Progressing     Problem: HEMATOLOGIC - ADULT  Goal: Maintains hematologic stability  Description: INTERVENTIONS  - Assess for signs and symptoms of bleeding or hemorrhage  - Monitor labs  - Administer supportive blood products/factors as ordered and appropriate  Outcome: Not Progressing     Problem: Nutrition/Hydration-ADULT  Goal: Nutrient/Hydration intake appropriate for improving, restoring or maintaining nutritional needs  Description: Monitor and assess patient's nutrition/hydration status for malnutrition  Collaborate with interdisciplinary team and initiate plan and interventions as ordered    Monitor patient's weight and dietary intake as ordered or per policy  Utilize nutrition screening tool and intervene as necessary  Determine patient's food preferences and provide high-protein, high-caloric foods as appropriate       INTERVENTIONS:  - Monitor oral intake, urinary output, labs, and treatment plans  - Assess nutrition and hydration status and recommend course of action  - Evaluate amount of meals eaten  - Assist patient with eating if necessary   - Allow adequate time for meals  - Recommend/ encourage appropriate diets, oral nutritional supplements, and vitamin/mineral supplements  - Order, calculate, and assess calorie counts as needed  - Recommend, monitor, and adjust tube feedings and TPN/PPN based on assessed needs  - Assess need for intravenous fluids  - Provide specific nutrition/hydration education as appropriate  - Include patient/family/caregiver in decisions related to nutrition  Outcome: Progressing     Problem: PAIN - ADULT  Goal: Verbalizes/displays adequate comfort level or baseline comfort level  Description: Interventions:  - Encourage patient to monitor pain and request assistance  - Assess pain using appropriate pain scale  - Administer analgesics based on type and severity of pain and evaluate response  - Implement non-pharmacological measures as appropriate and evaluate response  - Consider cultural and social influences on pain and pain management  - Notify physician/advanced practitioner if interventions unsuccessful or patient reports new pain  Outcome: Progressing     Problem: INFECTION - ADULT  Goal: Absence or prevention of progression during hospitalization  Description: INTERVENTIONS:  - Assess and monitor for signs and symptoms of infection  - Monitor lab/diagnostic results  - Monitor all insertion sites, i e  indwelling lines, tubes, and drains  - Monitor endotracheal if appropriate and nasal secretions for changes in amount and color  - [place] appropriate cooling/warming therapies per order  - Administer medications as ordered  - Instruct and encourage patient and family to use good hand hygiene technique  - Identify and instruct in appropriate isolation precautions for identified infection/condition  Outcome: Progressing  Goal: Absence of fever/infection during neutropenic period  Description: INTERVENTIONS:  - Monitor WBC    Outcome: Progressing     Problem: SAFETY ADULT  Goal: Patient will remain free of falls  Description: INTERVENTIONS:  - Educate patient/family on patient safety including physical limitations  - Instruct patient to call for assistance with activity   - Consult OT/PT to assist with strengthening/mobility   - Keep Call bell within reach  - Keep bed low and locked with side rails adjusted as appropriate  - Keep care items and personal belongings within reach  - Initiate and maintain comfort rounds  - Make Fall Risk Sign visible to staff    - Apply yellow socks and bracelet for high fall risk patients  - Consider moving patient to room near nurses station  Outcome: Progressing  Goal: Maintain or return to baseline ADL function  Description: INTERVENTIONS:  -  Assess patient's ability to carry out ADLs; assess patient's baseline for ADL function and identify physical deficits which impact ability to perform ADLs (bathing, care of mouth/teeth, toileting, grooming, dressing, etc )  - Assess/evaluate cause of self-care deficits   - Assess range of motion  - Assess patient's mobility; develop plan if impaired  - Assess patient's need for assistive devices and provide as appropriate  - Encourage maximum independence but intervene and supervise when necessary  - Involve family in performance of ADLs  - Assess for home care needs following discharge   - Consider OT consult to assist with ADL evaluation and planning for discharge  - Provide patient education as appropriate  Outcome: Not Progressing  Goal: Maintains/Returns to pre admission functional level  Description: INTERVENTIONS:  - Perform BMAT or MOVE assessment daily    - Set and communicate daily mobility goal to care team and patient/family/caregiver  - Collaborate with rehabilitation services on mobility goals if consulted    - Out of bed for toileting  - Record patient progress and toleration of activity level   Outcome: Not Progressing     Problem: DISCHARGE PLANNING  Goal: Discharge to home or other facility with appropriate resources  Description: INTERVENTIONS:  - Identify barriers to discharge w/patient and caregiver  - Arrange for needed discharge resources and transportation as appropriate  - Identify discharge learning needs (meds, wound care, etc )  - Arrange for interpretive services to assist at discharge as needed  - Refer to Case Management Department for coordinating discharge planning if the patient needs post-hospital services based on physician/advanced practitioner order or complex needs related to functional status, cognitive ability, or social support system  Outcome: Progressing     Problem: Knowledge Deficit  Goal: Patient/family/caregiver demonstrates understanding of disease process, treatment plan, medications, and discharge instructions  Description: Complete learning assessment and assess knowledge base    Interventions:  - Provide teaching at level of understanding  - Provide teaching via preferred learning methods  Outcome: Progressing     Problem: SAFETY,RESTRAINT: NV/NON-SELF DESTRUCTIVE BEHAVIOR  Goal: Remains free of harm/injury (restraint for non violent/non self-detsructive behavior)  Description: INTERVENTIONS:  - Instruct patient/family regarding restraint use   - Assess and monitor physiologic and psychological status   - Provide interventions and comfort measures to meet assessed patient needs   - Identify and implement measures to help patient regain control  - Assess readiness for release of restraint   Outcome: Progressing  Goal: Returns to optimal restraint-free functioning  Description: INTERVENTIONS:  - Assess the patient's behavior and symptoms that indicate continued need for restraint  - Identify and implement measures to help patient regain control  - Assess readiness for release of restraint   Outcome: Progressing     Problem: CONFUSION/THOUGHT DISTURBANCE  Goal: Thought disturbances (confusion, delirium, depression, dementia or psychosis) are managed to maintain or return to baseline mental status and functional level  Description: INTERVENTIONS:  - Assess for possible contributors to  thought disturbance, including but not limited to medications, infection, impaired vision or hearing, underlying metabolic abnormalities, dehydration, respiratory compromise,  psychiatric diagnoses and notify attending PHYSICAN/AP  - Monitor and intervene to maintain adequate nutrition, hydration, elimination, sleep and activity  - Decrease environmental stimuli, including noise as appropriate  - Provide frequent contacts to provide refocusing, direction and reassurance as needed  Approach patient calmly with eye contact and at their level    - [place] high risk fall precautions, aspiration precautions and other safety measures, as indicated  - If delirium suspected, notify physician/AP of change in condition and request immediate in-person evaluation  - Pursue consults as appropriate including Geriatric (campus dependent), OT for cognitive evaluation/activity planning, psychiatric, pastoral care, etc   Outcome: Progressing

## 2022-02-20 NOTE — ASSESSMENT & PLAN NOTE
· Outpatient regimen consists of: metoprolol + coumadin   · Coumadin held due to abdominal wall hematoma and anemia, continue to hold  · Rates into 170's on 2/16 requiring Amio bolus x 2 and then initiated on continuous infusion  · Continue amio infusion 0 5  · Digoxin 62 5mg QoD, last level 1 6, recheck prior to next dose tomorrow

## 2022-02-20 NOTE — PROGRESS NOTES
Vancomycin IV Pharmacy-to-Dose Consultation    Yolanda Churchill is a 60 y o  male who is currently receiving Vancomycin IV with management by the Pharmacy Consult service  Assessment/Plan:  The patient was reviewed  Renal function is stable and no signs or symptoms of nephrotoxicity and/or infusion reactions were documented in the chart  Based on todays assessment, continue current vancomycin (day # 8) dosing of 1750 every 24 h  No further levels unless patient continues on therapy past 2/21 or if CRRT changes  We will continue to follow the patients culture results and clinical progress daily      Isabella Friend, Pharmacist

## 2022-02-20 NOTE — ASSESSMENT & PLAN NOTE
· Multifactorial in setting of cardiac arrest, some cardiogenic component as evidenced by decreased EF on ECHO despite inotropic medications, and septic shock which is likely the largest component   · Septic component   · Continue broad spectrum ABX cefepime/vancomycin (days 5 and 8 respectively) through 2/21 for 10 days total   · Levophed 80, Vaso 0 04  · Wean as able for MAP > 65  · Cardiogenic component   · Epi 10  · Continue for cardiogenic support and also BP support, if able to wean down on levophed, would consider weaning down to lower levels  · ECHO revealed EF of 43%, mild to moderate global hypokinesis and pulmonary artery systolic pressure of 54 mmhg   IVC dilated, no retrophasic changes   · Would likely benefit from volume removal however this is limited by hemodynamics

## 2022-02-20 NOTE — PROCEDURES
Central Line Insertion    Date/Time: 2/19/2022 10:10 PM  Performed by: Jordan Cabezas PA-C  Authorized by: Jordan Cabezas PA-C     Patient location:  Bedside  Consent:     Consent obtained:  Verbal    Consent given by:  Spouse    Risks discussed:  Arterial puncture, bleeding, incorrect placement, infection, nerve damage and pneumothorax    Alternatives discussed:  No treatment, delayed treatment and alternative treatment  Universal protocol:     Procedure explained and questions answered to patient or proxy's satisfaction: yes      Site/side marked: yes      Immediately prior to procedure, a time out was called: yes      Patient identity confirmed: Anonymous protocol, patient vented/unresponsive  Pre-procedure details:     Hand hygiene: Hand hygiene performed prior to insertion      Sterile barrier technique: All elements of maximal sterile technique followed      Skin preparation:  ChloraPrep    Skin preparation agent: Skin preparation agent completely dried prior to procedure    Indications:     Central line indications: medications requiring central line and no peripheral vascular access    Sedation:     Sedation type: See MAR  Anesthesia (see MAR for exact dosages):      Anesthesia method:  Local infiltration    Local anesthetic:  Lidocaine 1% w/o epi  Procedure details:     Location:  Left internal jugular    Vessel type: vein      Laterality:  Left    Approach: percutaneous technique used      Patient position:  Reverse Trendelenburg (Unable to lay flat 2/2 peak airway pressures)    Catheter type:  Triple lumen 20cm    Landmarks identified: yes      Ultrasound guidance: yes      Ultrasound image availability:  Images available in PACS    Sterile ultrasound techniques: Sterile gel and sterile probe covers were used      Manometry confirmation: yes      Number of attempts:  1    Successful placement: yes    Post-procedure details:     Post-procedure:  Dressing applied and line sutured    Assessment: Blood return through all ports, free fluid flow, no pneumothorax on x-ray and placement verified by x-ray    Post-procedure complications: none      Patient tolerance of procedure:   Tolerated well, no immediate complications

## 2022-02-20 NOTE — ASSESSMENT & PLAN NOTE
· Patient with increasing oxygen requirements on 2/15 after episodes of vomiting, he became increasingly lethargic and unable to clear secretions   He was intubated with several aspiration occurrences; as patient was continuously vomiting   · 2/15 and 2/16 bronchoscopy for clearance of excessive secretions   · Mechanical ventilation  · 35/400/60/5  · Goal SpO2> 88%  · IBW TV 450cc for 6cc/kg however peak pressure 40-45 and Pplat 30 limiting increase  · VAP bundle ordered  · Continue nebs  · Continue to trend ABG's  · Continue dilaudid 2 mg/hr for goal of deep sedation   · Veletri discontinued

## 2022-02-21 NOTE — PROGRESS NOTES
[address] [facility]  Progress Note - Jacquelyn Pal 1960, 60 y o  male MRN: [id number]  Unit/Bed#: ICU 02 Encounter: [id number]  Primary Care Provider: Becky Miranda DO   Date and time admitted to hospital: 2/12/2022  6:51 PM    Cardiac arrest [facility])  Assessment & Plan  · Patient PEA cardiac arrest on 2/15  · Etiology secondary to aspiration hypoxia, downtime approximately 12 minutes   · Trend neuro exam as appropriate  · Once of sedation/paralytics need to evaluate his mental status for anoxic injury  · Following the arrest he was following commands, however, he continued to have significant refractory hypoxia which may have lead to secondary injury   · Nimbex discontinued 2/18      Acute respiratory failure with hypoxia and hypercapnia ([facility] [address] )  Assessment & Plan  · Patient with increasing oxygen requirements on 2/15 after episodes of vomiting, he became increasingly lethargic and unable to clear secretions   He was intubated with several aspiration occurrences; as patient was continuously vomiting   · 2/15 and 2/16 bronchoscopy for clearance of excessive secretions   · Mechanical ventilation  · 35/400/60/5  · Goal SpO2> 88%  · IBW TV 450cc for 6cc/kg however peak pressure 40-45 and Pplat 30 limiting increase  · VAP bundle ordered  · Continue nebs  · Continue to trend ABG's  · Continue dilaudid 2 mg/hr for goal of deep sedation   · Nimbex discontinued 2/18  · Veletri discontinued      Acute on chronic diastolic (congestive) heart failure ([facility] [address] )  Assessment & Plan  Wt Readings from Last 3 Encounters:   02/20/22 (!) 168 kg (371 lb 4 1 oz)   02/12/22 (!) 170 kg (374 lb 12 5 oz)   12/29/21 (!) 152 kg (336 lb)     · Patient noted to be volume overload with a increase in his weight of 20 kg since December 2021 and anasarca  · ECHO 2/16: LVEF 43%  · Continue CVVH with volume removal  · Continue epinephrine for ionotropic support     * Acute renal failure superimposed on stage 3b chronic kidney disease [facility])  Assessment & Plan  Lab Results   Component Value Date    EGFR 74 02/20/2022    EGFR 80 02/20/2022    EGFR 68 02/20/2022    CREATININE 1 06 02/20/2022    CREATININE 0 99 02/20/2022    CREATININE 1 13 02/20/2022     · Patient admitted to [place] [id number] due to mechanical fall and volume overload  · Patient on lasix 80 BID, zaroxolyn and midodrine prior to admission  · Nephrology was consulted and the patient was trialed on a lasix infusion which was transitioned to a bumex infusion  · After he failed to make progress with diuresis; CVVH was started 2/14      Abdominal wall hematoma  Assessment & Plan  · First diagnosed in December 2021 and underwent IR procedure   · Slightly increased in size  · Received 4 unit PRBC  · Trend H/H and transfuse as needed  · If patient continus to bleed may require repeat IR intervention  · Surgery consulted and signed off - no intervention at this time  · Consider re-engaging surgery regarding increased abdominal pressure which may be contributing to ventilator needs   However this is likely multifactorial in setting of anasarca 2/2 volume overload in addition to abdominal wall hematoma and ileus     Permanent atrial fibrillation (HCC)  Assessment & Plan  · Outpatient regimen consists of: metoprolol + coumadin   · Coumadin held due to abdominal wall hematoma and anemia, continue to hold  · Currently on heparin gtt  · Rates into 170's on 2/16 requiring Amio bolus x 2 and then initiated on continuous infusion  · Continue amio infusion 0 5  · Digoxin 62 5mg QoD, last level 1 1      Ileus (HCC)  Assessment & Plan  · OGT to low intermittent suction   · Continue bowel regiment   · Monitor for return of bowel function  · Did not tolerate trial of trickle TF    Urinary retention  Assessment & Plan  · Retrograde urethra gram and fluoroscopically guided urethral catheter placement by IR  · Maintain leahy catheter     Shock ([facility] [address] )  Assessment & Plan  · Multifactorial in setting of cardiac arrest, some cardiogenic component as evidenced by decreased EF on ECHO despite inotropic medications, and septic shock which is likely the largest component   · Septic component   · Continue broad spectrum ABX cefepime/vancomycin (days 6 and 9 respectively) through 2/21 for 10 days total   · Levophed 80, Vaso 0 04  · Wean as able for MAP > 65  · Cardiogenic component   · Epi 12  · Continue for cardiogenic support and also BP support, if able to wean down on levophed, would consider weaning down to lower levels  · ECHO revealed EF of 43%, mild to moderate global hypokinesis and pulmonary artery systolic pressure of 54 mmhg   IVC dilated, no retrophasic changes   · Would likely benefit from volume removal however this is limited by hemodynamics       Other cirrhosis of liver (HCC)  Assessment & Plan  · Possibly related to prior alcohol abuse vs vascular congestion 2/2 volume overload  · Outpatient follow up with GI    Acute on chronic anemia  Assessment & Plan  · Due to abdominal wall hematoma  · Received 4 units PRBC - Last transfusion on 2/20  · Trend H&H QD likely recent drop is secondary to frequently phlebotomy and volume overload, no obvious source of bleeding at this time   · Was transfused 1 UNIT PRBC for hgb 6 2 on 2/20  · Transfuse for hemoglobin <7 0         STEFF (obstructive sleep apnea)  Assessment & Plan  · Currently ventilated  · BIPAP as needed when extubated     Lymphedema  Assessment & Plan  · Chronic due to venous insufficiency and volume overload    ----------------------------------------------------------------------------------------  HPI/24hr events: No events overnight, was transfused for hgb 6 2 on 2/20    Patient appropriate for transfer out of the ICU today?: No  Disposition: Continue Critical Care   Code Status: Level 2 - DNAR: but accepts endotracheal intubation  ---------------------------------------------------------------------------------------  SUBJECTIVE  Intubated and sedated    Review of Systems  Review of systems was unable to be performed secondary to intubated and sedated  ---------------------------------------------------------------------------------------  OBJECTIVE    Vitals   Vitals:    22 0304 22 0334 22 0400 22 0600   BP:   117/59    BP Location:   Left arm    Pulse:   98    Resp:   (!) 35    Temp:  98 6 °F (37 °C) 98 2 °F (36 8 °C)    TempSrc:   Esophageal    SpO2: 96%  92%    Weight:   (!) 168 kg (371 lb 7 6 oz) (!) 168 kg (371 lb 7 6 oz)   Height:         Temp (24hrs), Av 3 °F (36 8 °C), Min:97 9 °F (36 6 °C), Max:98 6 °F (37 °C)  Current: Temperature: 98 2 °F (36 8 °C)  Arterial Line BP: 86/44  Arterial Line MAP (mmHg): 56 mmHg    Respiratory:  SpO2: SpO2: 92 %, SpO2 Activity: SpO2 Activity: At Rest, SpO2 Device: O2 Device: Ventilator  Nasal Cannula O2 Flow Rate (L/min): 4 L/min    Invasive/non-invasive ventilation settings   Respiratory  Report   Lab Data (Last 4 hours)       0439            pH, Arterial       7 227             pCO2, Arterial       58 9             pO2, Arterial       79 6             HCO3, Arterial       23 9             Base Excess, Arterial       -3 7                  O2/Vent Data        0304   Most Recent         Vent Mode AC/VC  AC/VC      Resp Rate (BPM) (BPM) 35  35      Vt (mL) (mL) 400  400      FIO2 (%) (%) 60  60      PEEP (cmH2O) (cmH2O) 6  6      MV 14 8  14 8                  Physical Exam  Constitutional:       Appearance: He is obese  He is ill-appearing  HENT:      Head: Normocephalic  Mouth/Throat:      Mouth: Mucous membranes are dry  Eyes:      Pupils: Pupils are equal, round, and reactive to light  Cardiovascular:      Rate and Rhythm: Normal rate  Pulmonary:      Breath sounds: Rhonchi and rales present  Abdominal:      General: There is distension  Tenderness: There is no abdominal tenderness  Musculoskeletal:         General: Swelling present        Cervical back: Neck supple  Lymphadenopathy:      Cervical: No cervical adenopathy  Skin:     General: Skin is warm and dry  Neurological:      Comments: Minimally responsive, sedated, intubated             Laboratory and Diagnostics:  Results from last 7 days   Lab Units 02/21/22  0444 02/20/22 2227 02/20/22  0407 02/19/22  1851 02/19/22  1826 02/19/22  0755 02/18/22  0423 02/17/22  8415 02/17/22  3309 02/16/22  1222 02/16/22  1222 02/15/22  2317 02/15/22  2317 02/15/22  0612 02/15/22  0612   WBC Thousand/uL 27 70* 26 78* 23 96* 27 51*  --  31 37* 31 31*  --  29 10*   < > 8 40   < > 13 52*   < > 6 97   HEMOGLOBIN g/dL 7 4* 7 5* 6 2* 7 0* 7 0* 6 7* 6 6*   < > 7 4*   < > 8 1*   < > 7 8*   < > 7 4*   HEMATOCRIT % 24 7* 24 7* 21 4* 23 8*  --  23 2* 22 8*  --  25 2*   < > 28 2*   < > 27 5*   < > 25 0*   PLATELETS Thousands/uL 38* 33* 32* 33*  --  38* 47*  --  67*   < > 128*   < > 115*   < > 100*   NEUTROS PCT %  --   --   --   --   --   --   --   --   --   --   --   --  87*  --  80*   BANDS PCT %  --   --   --   --   --  4  --   --  43*  --  40*  --   --   --   --    MONOS PCT %  --   --   --   --   --   --   --   --   --   --   --   --  4  --  9   MONO PCT %  --   --   --   --   --  4  --   --  9  --  4  --   --   --   --     < > = values in this interval not displayed       Results from last 7 days   Lab Units 02/21/22  0436 02/20/22 2227 02/20/22  1757 02/20/22  1141 02/20/22  0406 02/20/22  0001 02/19/22 1826 02/16/22  2051 02/16/22  1222 02/16/22  0236 02/15/22  2317 02/15/22  1821 02/15/22  1157   SODIUM mmol/L 137 136 137 138 137 138 138   < > 131*   < > 131*   < > 129*   POTASSIUM mmol/L 4 6 4 5 4 7 4 2 4 1 4 2 4 7   < > 3 3*   < > 4 1   < > 3 7   CHLORIDE mmol/L 104 103 104 105 104 104 104   < > 98*   < > 97*   < > 94*   CO2 mmol/L 24 26 24 25 26 24 24   < > 24   < > 29   < > 30   ANION GAP mmol/L 9 7 9 8 7 10 10   < > 9   < > 5   < > 5   BUN mg/dL 13 13 13 14 16 17 19   < > 33*   < > 33*   < > 39*   CREATININE mg/dL 1 09 1 06 0 99 1 13 1 15 1 18 1 17   < > 1 77*   < > 1 74*   < > 1 78*   CALCIUM mg/dL 9 2 9 0 9 0 8 8 9 1 8 7 8 7   < > 8 1*   < > 9 7   < > 8 9   GLUCOSE RANDOM mg/dL 96 88 81 90 92 89 92   < > 116   < > 139   < > 109   ALT U/L  --   --   --   --   --   --   --   --  11*  --  18  --  14   AST U/L  --   --   --   --   --   --   --   --  35  --  30  --  27   ALK PHOS U/L  --   --   --   --   --   --   --   --  84  --  119*  --  125*   ALBUMIN g/dL  --   --   --   --   --   --   --   --  2 4*  --  2 7*  --  2 4*   TOTAL BILIRUBIN mg/dL  --   --   --   --   --   --   --   --  2 40*  --  2 21*  --  1 54*    < > = values in this interval not displayed  Results from last 7 days   Lab Units 02/21/22  0436 02/20/22  2227 02/20/22  1757 02/20/22  1141 02/20/22  0406 02/20/22  0001 02/19/22  1826   MAGNESIUM mg/dL 2 0 2 0 1 9 1 9 2 1 1 9 2 0   PHOSPHORUS mg/dL 2 3 2 1* 1 9* 1 8* 2 0* 1 8* 2 0*      Results from last 7 days   Lab Units 02/18/22  0423 02/16/22  1351   INR  2 02* 1 40*   PTT seconds 66*  --           Results from last 7 days   Lab Units 02/18/22  2358 02/18/22  0423 02/17/22  1620 02/17/22  1154 02/17/22  0449 02/16/22  2355 02/16/22  1838   LACTIC ACID mmol/L 1 9 1 7 2 3* 2 8* 4 1* 5 1* 5 1*     ABG:  Results from last 7 days   Lab Units 02/21/22  0439   PH ART  7 227*   PCO2 ART mm Hg 58 9*   PO2 ART mm Hg 79 6   HCO3 ART mmol/L 23 9   BASE EXC ART mmol/L -3 7   ABG SOURCE  Line, Arterial     VBG:  Results from last 7 days   Lab Units 02/21/22  0439 02/20/22  0409 02/19/22  2224   PH ALISIA   --   --  7 262*   PCO2 ALISIA mm Hg  --   --  51 0*   PO2 ALISIA mm Hg  --   --  62 8*   HCO3 ALISIA mmol/L  --   --  22 5*   BASE EXC ALISIA mmol/L  --   --  -4 4   ABG SOURCE  Line, Arterial   < >  --     < > = values in this interval not displayed       Results from last 7 days   Lab Units 02/19/22  0512 02/18/22  0423 02/17/22  0449 02/16/22  1222   PROCALCITONIN ng/ml 64 33* 97 02* 92 31* 27 51*       Micro  Results from last 7 days   Lab Units 02/17/22  1428 02/17/22  1223 02/16/22  0939   BLOOD CULTURE  No Growth at 72 hrs  No Growth at 72 hrs   --    GRAM STAIN RESULT   --   --  No Polys or Bacteria seen       EKG:   Imaging: I have personally reviewed pertinent reports  and I have personally reviewed pertinent films in PACS    Intake and Output  I/O       02/19 0701 02/20 0700 02/20 0701 02/21 0700    I V  (mL/kg) 4554 3 (27 1) 3275 3 (19 5)    Blood 700 350    NG/ 110    IV Piggyback 680 335    Total Intake(mL/kg) 6054 3 (36) 4070 3 (24 2)    Urine (mL/kg/hr) 0 (0) 0 (0)    Emesis/NG output 700 400    Other 6787 5317    Total Output 7487 5717    Net -1434 2 -0915 7                Height and Weights   Height: 5' 11" (180 3 cm)  IBW (Ideal Body Weight): 75 3 kg  Body mass index is 51 81 kg/m²  Weight (last 2 days)     Date/Time Weight    02/21/22 0600 168 (371 48)    02/21/22 0400 168 (371 48)    02/20/22 0550 168 (371 25)    02/19/22 0600 168 (370 81)    02/19/22 0300 168 (370 81)            Nutrition       Diet Orders   (From admission, onward)             Start     Ordered    02/19/22 2128  Diet NPO  Diet effective now        References:    Nutrtion Support Algorithm Enteral vs  Parenteral   Question Answer Comment   Diet Type NPO    RD to adjust diet per protocol?  Yes        02/19/22 2127                  Active Medications  Scheduled Meds:  Current Facility-Administered Medications   Medication Dose Route Frequency Provider Last Rate    acetaminophen  650 mg Oral Q6H PRN Ana Rosaus, CRNP      albuterol  2 5 mg Nebulization Q6H PRN TRES Bhandari      amiodarone  0 5 mg/min Intravenous Continuous TRES Shannon 0 5 mg/min (02/21/22 0601)    cefepime  2,000 mg Intravenous Q12H TRES Lagunas 2,000 mg (02/20/22 2129)    chlorhexidine  15 mL Mouth/Throat Q12H [facility] TRES Cai      digoxin  62 5 mcg Intravenous Every Other Day TRES Montgomery      epinephrine  1-20 mcg/min Intravenous Titrated TRES Marion 12 mcg/min (02/21/22 0601)    heparin (porcine)  500 Units/hr Pre Filter Continuous Jewels Coyle  Units/hr (02/21/22 0601)    hydrocortisone sodium succinate  100 mg Intravenous Q8H [address] TRES Dee      HYDROmorphone  2 mg/hr Intravenous Continuous Margie Espinosa PA-C 2 mg/hr (02/21/22 0640)    HYDROmorphone  1 mg Intravenous Q2H PRN Margie Jefferson PA-C      influenza vaccine  0 5 mL Intramuscular Once John Villafana MD      lactulose  20 g Oral TID TRES Dee      levalbuterol  1 25 mg Nebulization TID Monna Sandhoff, CRNP      midazolam  2 mg Intravenous Once Lewis Rowley Prime      norepinephrine  1-100 mcg/min Intravenous Titrated TRES Marion 60 mcg/min (02/21/22 0601)    NxStage K 4/Ca 3  20,000 mL Dialysis Continuous Jewels Coyle MD      pantoprazole  40 mg Intravenous Q24H [address] TRES Lagunas      polyethylene glycol  17 g Oral BID TRES aMrion      senna-docusate sodium  2 tablet Oral BID TRES Morel      sodium chloride  4 mL Nebulization Q6H TRES Laguans      sodium phosphate  6 mmol Intravenous Once John Villafana MD 6 mmol (02/21/22 8646)    vancomycin  15 mg/kg (Adjusted) Intravenous Q24H Neha Moreno MD 1,750 mg (02/20/22 1132)    vasopressin  0 04 Units/min Intravenous Continuous TRES Lagunas 0 04 Units/min (02/21/22 0601)     Continuous Infusions:  amiodarone, 0 5 mg/min, Last Rate: 0 5 mg/min (02/21/22 0601)  epinephrine, 1-20 mcg/min, Last Rate: 12 mcg/min (02/21/22 0601)  heparin (porcine), 500 Units/hr, Last Rate: 500 Units/hr (02/21/22 0601)  HYDROmorphone, 2 mg/hr, Last Rate: 2 mg/hr (02/21/22 0640)  norepinephrine, 1-100 mcg/min, Last Rate: 60 mcg/min (02/21/22 0601)  NxStage K 4/Ca 3, 20,000 mL  vasopressin, 0 04 Units/min, Last Rate: 0 04 Units/min (02/21/22 0601)      PRN Meds:   acetaminophen, 650 mg, Q6H PRN  albuterol, 2 5 mg, Q6H PRN  HYDROmorphone, 1 mg, Q2H PRN        Invasive Devices Review  Invasive Devices  Report    Central Venous Catheter Line            CVC Central Lines 02/19/22 Triple 20cm Left Internal jugular 1 day          Arterial Line            Arterial Line 02/16/22 Radial 5 days          Hemodialysis Catheter            HD Temporary Double Catheter 7 days          Drain            Urethral Catheter Temperature probe 16 Fr  3 days    NG/OG/Enteral Tube Orogastric 2 days          Airway            ETT  Cuffed;Oral 7 5 mm 5 days                Rationale for remaining devices:   ---------------------------------------------------------------------------------------  Advance Directive and Living Will:      Power of :    POLST:    ---------------------------------------------------------------------------------------  Care Time Delivered:         TRES Jones      Portions of the record may have been created with voice recognition software  Occasional wrong word or "sound a like" substitutions may have occurred due to the inherent limitations of voice recognition software    Read the chart carefully and recognize, using context, where substitutions have occurred

## 2022-02-21 NOTE — PROGRESS NOTES
Follow up Consultation    Nephrology   Rossi Hernandez 60 y o  male MRN: [id number]  Unit/Bed#: ICU 02 Encounter: [id number]      Physician Requesting Consult: Carson Alejandre MD        ASSESSMENT/PLAN:  69-year-old male with multiple comorbidities including CKD stage 3, CHF, anemia, lymphedema, STEFF, cirrhosis, AFib admitted status post fall with volume overload nephrology following for acute kidney injury and volume overload  Acute kidney injury on CKD stage 3:  MARISABEL most likely secondary to cardiorenal syndrome plus ischemic injury secondary hypotension plus ERICH plus cardiac arrest and subsequent ATN  After review of records In [facility] as well as Care everywhere it appears that the patient has a baseline Creatinine of 1 6-2 0mg/dL  patient was admitted with a creatinine of 1 91 mg/dL on 02/08/2022  patient's creatinine today is at 1 09 mg/dL  Started on CVVHD on 02/13/2022  Currently has right IJ temporary dialysis catheter in place  For now will continue run even as patient is unable to tolerate UF with running net negative  Try to run net negative if tolerable  Currently code status is DNR for family meeting tomorrow for further goals of care  Continue 4K bath  Getting adequate clearance with CVVHD at this time  check BMP, magnesium, phosphorus as per CVVHD protocol  Await renal recovery  Optimize hemodynamic status to avoid delay in renal recovery  Place on a renal diet when allowed diet order  Avoid nephrotoxins, adjust meds to appropriate GFR  Strict I/O  Daily weights  Urinary retention protocol if patient does not have a Gann  Most likely has underlying CKD secondary to chronic liver disease plus obesity related hyper filtration plus age-related nephron loss  will need to set up patient for follow up with Nephrology as an outpatient post hospitalization    for nephrology as an outpatient patient follows up with Dr Anne Marie Perry    Blood pressure/hypotension:  current medications:  Levophed, vasopressin, epinephrine  recommendations:  Titrate off of pressors as tolerable  Optimize hemodynamics  Maintain MAP > 65mmHg  Avoid BP fluctuations  H/H/anemia:  most recent hemoglobin at 7 4 grams/deciliter  maintain hemoglobin greater than 8 grams/deciliter  Management primary team    Acid-base electrolytes:    Electrolytes:      Hyperphosphatemia/now hypophosphatemia:  On repletion protocol per CVVHD  Continue BMP, phosphorus per protocol    Acid-base:    Most recent bicarb at 24 stable be corrected with dialysis    Other medical problems:  Proteinuria: Most recent UA with no protein as of 2022  AFib:  Management per primary team   On digoxin  Liver cirrhosis:  Follow-up with GI management primary team  Respiratory failure:  Remains intubated management primary team  Septic shock:  Management per primary team   On cefepime, Solu-Cortef, vancomycin  Overall poor prognosis      Thanks for the consult  Will continue to follow  Please call with questions/ concerns  Above-mentioned orders and Plan in terms of acute kidney injury was discussed with the team in [address] Melendez [facility] 10:23 AM              Objective :   Patient seen and examined in the ICU while on CVVHD at 8:20 a m  Running net even for now remains on vasopressin, epinephrine, Levophed  Unable to tolerate significant UF  UF total last 24 hours 7 4 L remains intubated  Plan for family meeting for goals tomorrow  PHYSICAL EXAM  /59 (BP Location: Left arm)   Pulse 96   Temp 99 °F (37 2 °C) (Esophageal)   Resp (!) 35   Ht 5' 11" (1 803 m)   Wt (!) 168 kg (371 lb 7 6 oz)   SpO2 97%   BMI 51 81 kg/m²   Temp (24hrs), Av 4 °F (36 9 °C), Min:97 5 °F (36 4 °C), Max:99 °F (37 2 °C)        Intake/Output Summary (Last 24 hours) at 2022 1023  Last data filed at 2022 1000  Gross per 24 hour   Intake 5257 62 ml   Output 7382 ml   Net -2124 38 ml       I/O last 24 hours:   In: 6302 6 [I V :5257 6; Blood:350; NG/GT:160; IV Piggyback:535]  Out: 3018 [Emesis/NG output:500; QHOZD:4015]      Current Weight: Weight - Scale: (!) 168 kg (371 lb 7 6 oz)  First Weight: Weight - Scale: (!) 170 kg (374 lb 1 9 oz)  Physical Exam  Vitals and nursing note reviewed  Constitutional:       General: He is not in acute distress  Appearance: Normal appearance  He is obese  He is ill-appearing  He is not toxic-appearing or diaphoretic  HENT:      Head: Normocephalic and atraumatic  Mouth/Throat:      Comments: Intubated  Eyes:      General: No scleral icterus  Neck:      Comments: Right IJ temporary dialysis catheter  Cardiovascular:      Rate and Rhythm: Normal rate  Heart sounds: Normal heart sounds  Pulmonary:      Effort: Pulmonary effort is normal  No respiratory distress  Breath sounds: No stridor  Abdominal:      General: There is no distension  Palpations: Abdomen is soft  Comments: Plus one pitting edema   Musculoskeletal:         General: Swelling present  Comments: Plus two edema bilateral upper extremities   Skin:     General: Skin is warm  Coloration: Skin is not jaundiced               Review of Systems   Unable to perform ROS: Intubated       Scheduled Meds:  Current Facility-Administered Medications   Medication Dose Route Frequency Provider Last Rate    acetaminophen  650 mg Oral Q6H PRN TRES Soni      albuterol  2 5 mg Nebulization Q6H PRTRES Engel      amiodarone  0 5 mg/min Intravenous Continuous Aguilar ERNA ChandlerNP 0 5 mg/min (02/21/22 0601)    bisacodyl  10 mg Rectal Daily Dolores Joseph PA-C      cefepime  2,000 mg Intravenous Q12H Dolores Jose Joseph PA-C 2,000 mg (02/20/22 2129)    chlorhexidine  15 mL Mouth/Throat Q12H [address] TRES Cai      dexmedetomidine  0 1-0 7 mcg/kg/hr Intravenous Titrated Odette JAVAD Costa      digoxin  62 5 mcg Intravenous Every Other Day TRES Holman      epinephrine 1-20 mcg/min Intravenous Titrated TRES Marion 12 mcg/min (02/21/22 0601)    fentanyl citrate (PF)  50 mcg Intravenous Q1H PRN Zaria Oropeza PA-C      heparin (porcine)  500 Units/hr Pre Filter Continuous Jewels Coyle  Units/hr (02/21/22 0700)    hydrocortisone sodium succinate  100 mg Intravenous Q8H [facility] TRES Dee      HYDROmorphone  2 mg/hr Intravenous Continuous Margie Espinosa PA-C 1 mg/hr (02/21/22 8942)    influenza vaccine  0 5 mL Intramuscular Once John Villafana MD      lactulose  30 g Oral TID Zaria Oropeza PA-C      levalbuterol  1 25 mg Nebulization TID Monna Sandhoff, CRNP      norepinephrine  1-100 mcg/min Intravenous Titrated TRES Marion 60 mcg/min (02/21/22 0907)    NxStage K 4/Ca 3  20,000 mL Dialysis Continuous Jewels Coyle MD      pantoprazole  40 mg Intravenous Q24H [facility] TRES Lagunas      polyethylene glycol  17 g Oral BID TRES Marion      senna-docusate sodium  2 tablet Oral BID TRES Morel      sodium chloride  4 mL Nebulization Q6H TRES Lagunas      vancomycin  15 mg/kg (Adjusted) Intravenous Q24H Zaria Oropeza PA-C 1,750 mg (02/20/22 1132)    vasopressin  0 04 Units/min Intravenous Continuous TRES Lagunas 0 04 Units/min (02/21/22 0935)       PRN Meds:   acetaminophen    albuterol    fentanyl citrate (PF)    Continuous Infusions:amiodarone, 0 5 mg/min, Last Rate: 0 5 mg/min (02/21/22 0601)  dexmedetomidine, 0 1-0 7 mcg/kg/hr  epinephrine, 1-20 mcg/min, Last Rate: 12 mcg/min (02/21/22 0601)  heparin (porcine), 500 Units/hr, Last Rate: 500 Units/hr (02/21/22 0700)  HYDROmorphone, 2 mg/hr, Last Rate: 1 mg/hr (02/21/22 0808)  norepinephrine, 1-100 mcg/min, Last Rate: 60 mcg/min (02/21/22 0907)  NxStage K 4/Ca 3, 20,000 mL  vasopressin, 0 04 Units/min, Last Rate: 0 04 Units/min (02/21/22 0935)          Invasive Devices:      Invasive Devices  Report Central Venous Catheter Line            CVC Central Lines 02/19/22 Triple 20cm Left Internal jugular 1 day          Arterial Line            Arterial Line 02/16/22 Radial 5 days          Hemodialysis Catheter            HD Temporary Double Catheter 7 days          Drain            NG/OG/Enteral Tube Orogastric 3 days    Urethral Catheter Temperature probe 16 Fr  3 days          Airway            ETT  Cuffed;Oral 7 5 mm 5 days                  LABORATORY:    Results from last 7 days   Lab Units 02/21/22  0444 02/21/22  0436 02/20/22  2227 02/20/22  1757 02/20/22  1141 02/20/22  0407 02/20/22  0406 02/20/22  0001 02/19/22  1851 02/19/22  1826 02/19/22  1151 02/19/22  0755 02/18/22  1222 02/18/22  0423 02/17/22  1154 02/17/22  0821   WBC Thousand/uL 27 70*  --  26 78*  --   --  23 96*  --   --  27 51*  --   --  31 37*  --  31 31*  --  29 10*   HEMOGLOBIN g/dL 7 4*  --  7 5*  --   --  6 2*  --   --  7 0* 7 0*  --  6 7*  --  6 6*   < > 7 4*   HEMATOCRIT % 24 7*  --  24 7*  --   --  21 4*  --   --  23 8*  --   --  23 2*  --  22 8*  --  25 2*   PLATELETS Thousands/uL 38*  --  33*  --   --  32*  --   --  33*  --   --  38*  --  47*  --  67*   POTASSIUM mmol/L  --  4 6 4 5 4 7 4 2  --  4 1 4 2  --  4 7   < >  --    < > 4 8   < >  --    CHLORIDE mmol/L  --  104 103 104 105  --  104 104  --  104   < >  --    < > 102   < >  --    CO2 mmol/L  --  24 26 24 25  --  26 24  --  24   < >  --    < > 24   < >  --    BUN mg/dL  --  13 13 13 14  --  16 17  --  19   < >  --    < > 24   < >  --    CREATININE mg/dL  --  1 09 1 06 0 99 1 13  --  1 15 1 18  --  1 17   < >  --    < > 1 43*   < >  --    CALCIUM mg/dL  --  9 2 9 0 9 0 8 8  --  9 1 8 7  --  8 7   < >  --    < > 8 9   < >  --    MAGNESIUM mg/dL  --  2 0 2 0 1 9 1 9  --  2 1 1 9  --  2 0   < >  --    < > 2 2   < >  --    PHOSPHORUS mg/dL  --  2 3 2 1* 1 9* 1 8*  --  2 0* 1 8*  --  2 0*   < >  --    < > 2 2*   < >  --     < > = values in this interval not displayed        rest all reviewed    RADIOLOGY:  XR abdomen 1 view kub   Final Result by Akhil Martines MD (02/21 1017)      Significantly limited study without obvious gaseous distention of bowel  Bilateral pleural effusions are noted               Workstation performed: [id number]         XR chest portable ICU   Final Result by Cristi Swanson MD (02/20 3280)   1  Tubes and lines as above without pneumothorax  2   Stable cardiac megaly and bilateral airspace opacities concerning for pulmonary edema pattern with right basilar pleural effusion  Overall findings consistent with CHF  Workstation performed: [id number]         XR chest portable ICU   Final Result by Chriss Rodriguez MD (02/16 7358)      There is increased diffuse opacification of the left hemithorax which may be due to large pleural effusion, atelectasis, and progressive consolidation  Extensive diffuse consolidation in the right lung appears slightly increased in extent  The study was marked in [facility] for immediate notification  Workstation performed: [id number]         XR chest portable ICU   Final Result by Stas Jones MD (27/62 6294)      Progressive diffuse bilateral infiltrates suspicious for Covid pneumonia                  Workstation performed: [id number]         X-ray chest 1 view   Final Result by Leonarda Amador MD (02/16 7940)         1  ET tube in satisfactory position 4 2 cm above the elton  2   Interval development of an extensive infiltrate in the left lung  3   Abnormal appearance of the right lung with an infiltrate, pleural effusion as well as worsening atelectasis at the right lung base  Workstation performed: [id number]         XR chest portable ICU   Final Result by Kristie Downey MD (02/16 1212)      Interval development of the right lung opacification and likely pleural effusion                    Workstation performed: [id number]         CT abdomen pelvis wo contrast   Final Result by Zina Peralta MD (02/16 0003)      Right lower lobe pneumonia likely secondary to aspiration  Comparing to 2/8/2022, numerous distended loops of small bowel without discrete transition point suggesting ileus  Workstation performed: [id number]         VAS upper limb venous duplex scan, unilateral/limited   Final Result by Neelam Bonilla MD (02/15 1210)      XR chest portable   Final Result by Telly Holden MD (02/14 1811)      Right IJ line has been placed and tip overlies the SVC  No pneumothorax  Stable findings for small right pleural effusion with associated infiltrate  Workstation performed: [id number]           Rest all reviewed    Portions of the record may have been created with voice recognition software  Occasional wrong word or "sound a like" substitutions may have occurred due to the inherent limitations of voice recognition software  Read the chart carefully and recognize, using context, where substitutions have occurred  If you have any questions, please contact the dictating provider

## 2022-02-21 NOTE — PROGRESS NOTES
PT remains intubated, continues with NPO status d/t ileus, PT was tried on trickle TF 2/19 but did not tolerate, PT now NPO x 7 days since 2/15, consider initiating standard TPN, monitor electrolytes advance as tolerated to goal TPN: AA15 600mL, D70 400mL, gcrayl13% 200ml provide total of 1712cal, 90g pro, 1200mL, glucose load 1 1mg/kg/min, lipid load   2g/kg/day  Will continue to monitor for transition to TF as ileus resolves

## 2022-02-21 NOTE — ASSESSMENT & PLAN NOTE
· Patient with increasing oxygen requirements on 2/15 after episodes of vomiting, he became increasingly lethargic and unable to clear secretions   He was intubated with several aspiration occurrences; as patient was continuously vomiting   · 2/15 and 2/16 bronchoscopy for clearance of excessive secretions   · Mechanical ventilation  · 35/400/60/5  · Goal SpO2> 88%  · IBW TV 450cc for 6cc/kg however peak pressure 40-45 and Pplat 30 limiting increase  · VAP bundle ordered  · Continue nebs  · Continue to trend ABG's  · Continue dilaudid 2 mg/hr for goal of deep sedation   · Nimbex discontinued 2/18  · Veletri discontinued

## 2022-02-21 NOTE — ASSESSMENT & PLAN NOTE
· Multifactorial in setting of cardiac arrest, some cardiogenic component as evidenced by decreased EF on ECHO despite inotropic medications, and septic shock which is likely the largest component   · Septic component   · Continue broad spectrum ABX cefepime/vancomycin (days 6 and 9 respectively) through 2/21 for 10 days total   · Levophed 80, Vaso 0 04  · Wean as able for MAP > 65  · Cardiogenic component   · Epi 12  · Continue for cardiogenic support and also BP support, if able to wean down on levophed, would consider weaning down to lower levels  · ECHO revealed EF of 43%, mild to moderate global hypokinesis and pulmonary artery systolic pressure of 54 mmhg   IVC dilated, no retrophasic changes   · Would likely benefit from volume removal however this is limited by hemodynamics

## 2022-02-21 NOTE — PLAN OF CARE
Problem: Potential for Falls  Goal: Patient will remain free of falls  Description: INTERVENTIONS:  - Educate patient/family on patient safety including physical limitations  - Instruct patient to call for assistance with activity   - Consult OT/PT to assist with strengthening/mobility   - Keep Call bell within reach  - Keep bed low and locked with side rails adjusted as appropriate  - Keep care items and personal belongings within reach  - Initiate and maintain comfort rounds  - Make Fall Risk Sign visible to staff      - Apply yellow socks and bracelet for high fall risk patients  - Consider moving patient to room near nurses station  Outcome: Progressing     Problem: MOBILITY - ADULT  Goal: Maintain or return to baseline ADL function  Description: INTERVENTIONS:  -  Assess patient's ability to carry out ADLs; assess patient's baseline for ADL function and identify physical deficits which impact ability to perform ADLs (bathing, care of mouth/teeth, toileting, grooming, dressing, etc )  - Assess/evaluate cause of self-care deficits   - Assess range of motion  - Assess patient's mobility; develop plan if impaired  - Assess patient's need for assistive devices and provide as appropriate  - Encourage maximum independence but intervene and supervise when necessary  - Involve family in performance of ADLs  - Assess for home care needs following discharge   - Consider OT consult to assist with ADL evaluation and planning for discharge  - Provide patient education as appropriate  Outcome: Not Progressing  Goal: Maintains/Returns to pre admission functional level  Description: INTERVENTIONS:  - Perform BMAT or MOVE assessment daily    - Set and communicate daily mobility goal to care team and patient/family/caregiver     - Collaborate with rehabilitation services on mobility goals if consulted    - Out of bed for toileting  - Record patient progress and toleration of activity level   Outcome: Not Progressing     Problem: Prexisting or High Potential for Compromised Skin Integrity  Goal: Skin integrity is maintained or improved  Description: INTERVENTIONS:  - Identify patients at risk for skin breakdown  - Assess and monitor skin integrity  - Assess and monitor nutrition and hydration status  - Monitor labs   - Assess for incontinence   - Turn and reposition patient  - Assist with mobility/ambulation  - Relieve pressure over bony prominences  - Avoid friction and shearing  - Provide appropriate hygiene as needed including keeping skin clean and dry  - Evaluate need for skin moisturizer/barrier cream  - Collaborate with interdisciplinary team   - Patient/family teaching  - Consider wound care consult   Outcome: Progressing     Problem: METABOLIC, FLUID AND ELECTROLYTES - ADULT  Goal: Electrolytes maintained within normal limits  Description: INTERVENTIONS:  - Monitor labs and assess patient for signs and symptoms of electrolyte imbalances  - Administer electrolyte replacement as ordered  - Monitor response to electrolyte replacements, including repeat lab results as appropriate  - Instruct patient on fluid and nutrition as appropriate  Outcome: Not Progressing  Goal: Fluid balance maintained  Description: INTERVENTIONS:  - Monitor labs   - Monitor I/O and WT  - Instruct patient on fluid and nutrition as appropriate  - Assess for signs & symptoms of volume excess or deficit  Outcome: Not Progressing  Goal: Glucose maintained within target range  Description: INTERVENTIONS:  - Monitor Blood Glucose as ordered  - Assess for signs and symptoms of hyperglycemia and hypoglycemia  - Administer ordered medications to maintain glucose within target range  - Assess nutritional intake and initiate nutrition service referral as needed  Outcome: Not Progressing     Problem: CARDIOVASCULAR - ADULT  Goal: Maintains optimal cardiac output and hemodynamic stability  Description: INTERVENTIONS:  - Monitor I/O, vital signs and rhythm  - Monitor for S/S and trends of decreased cardiac output  - Administer and titrate ordered vasoactive medications to optimize hemodynamic stability  - Assess quality of pulses, skin color and temperature  - Assess for signs of decreased coronary artery perfusion  - Instruct patient to report change in severity of symptoms  Outcome: Not Progressing  Goal: Absence of cardiac dysrhythmias or at baseline rhythm  Description: INTERVENTIONS:  - Continuous cardiac monitoring, vital signs, obtain 12 lead EKG if ordered  - Administer antiarrhythmic and heart rate control medications as ordered  - Monitor electrolytes and administer replacement therapy as ordered  Outcome: Not Progressing     Problem: RESPIRATORY - ADULT  Goal: Achieves optimal ventilation and oxygenation  Description: INTERVENTIONS:  - Assess for changes in respiratory status  - Assess for changes in mentation and behavior  - Position to facilitate oxygenation and minimize respiratory effort  - Oxygen administered by appropriate delivery if ordered  - Initiate smoking cessation education as indicated  - Encourage broncho-pulmonary hygiene including cough, deep breathe, Incentive Spirometry  - Assess the need for suctioning and aspirate as needed  - Assess and instruct to report SOB or any respiratory difficulty  - Respiratory Therapy support as indicated  Outcome: Not Progressing     Problem: HEMATOLOGIC - ADULT  Goal: Maintains hematologic stability  Description: INTERVENTIONS  - Assess for signs and symptoms of bleeding or hemorrhage  - Monitor labs  - Administer supportive blood products/factors as ordered and appropriate  Outcome: Not Progressing     Problem: Nutrition/Hydration-ADULT  Goal: Nutrient/Hydration intake appropriate for improving, restoring or maintaining nutritional needs  Description: Monitor and assess patient's nutrition/hydration status for malnutrition  Collaborate with interdisciplinary team and initiate plan and interventions as ordered    Monitor patient's weight and dietary intake as ordered or per policy  Utilize nutrition screening tool and intervene as necessary  Determine patient's food preferences and provide high-protein, high-caloric foods as appropriate       INTERVENTIONS:  - Monitor oral intake, urinary output, labs, and treatment plans  - Assess nutrition and hydration status and recommend course of action  - Evaluate amount of meals eaten  - Assist patient with eating if necessary   - Allow adequate time for meals  - Recommend/ encourage appropriate diets, oral nutritional supplements, and vitamin/mineral supplements  - Order, calculate, and assess calorie counts as needed  - Recommend, monitor, and adjust tube feedings and TPN/PPN based on assessed needs  - Assess need for intravenous fluids  - Provide specific nutrition/hydration education as appropriate  - Include patient/family/caregiver in decisions related to nutrition  Outcome: Not Progressing     Problem: PAIN - ADULT  Goal: Verbalizes/displays adequate comfort level or baseline comfort level  Description: Interventions:  - Encourage patient to monitor pain and request assistance  - Assess pain using appropriate pain scale  - Administer analgesics based on type and severity of pain and evaluate response  - Implement non-pharmacological measures as appropriate and evaluate response  - Consider cultural and social influences on pain and pain management  - Notify physician/advanced practitioner if interventions unsuccessful or patient reports new pain  Outcome: Progressing     Problem: INFECTION - ADULT  Goal: Absence or prevention of progression during hospitalization  Description: INTERVENTIONS:  - Assess and monitor for signs and symptoms of infection  - Monitor lab/diagnostic results  - Monitor all insertion sites, i e  indwelling lines, tubes, and drains  - Monitor endotracheal if appropriate and nasal secretions for changes in amount and color  - [place] appropriate cooling/warming therapies per order  - Administer medications as ordered  - Instruct and encourage patient and family to use good hand hygiene technique  - Identify and instruct in appropriate isolation precautions for identified infection/condition  Outcome: Progressing  Goal: Absence of fever/infection during neutropenic period  Description: INTERVENTIONS:  - Monitor WBC    Outcome: Progressing     Problem: SAFETY ADULT  Goal: Patient will remain free of falls  Description: INTERVENTIONS:  - Educate patient/family on patient safety including physical limitations  - Instruct patient to call for assistance with activity   - Consult OT/PT to assist with strengthening/mobility   - Keep Call bell within reach  - Keep bed low and locked with side rails adjusted as appropriate  - Keep care items and personal belongings within reach  - Initiate and maintain comfort rounds  - Make Fall Risk Sign visible to staff    - Apply yellow socks and bracelet for high fall risk patients  - Consider moving patient to room near nurses station  Outcome: Progressing  Goal: Maintain or return to baseline ADL function  Description: INTERVENTIONS:  -  Assess patient's ability to carry out ADLs; assess patient's baseline for ADL function and identify physical deficits which impact ability to perform ADLs (bathing, care of mouth/teeth, toileting, grooming, dressing, etc )  - Assess/evaluate cause of self-care deficits   - Assess range of motion  - Assess patient's mobility; develop plan if impaired  - Assess patient's need for assistive devices and provide as appropriate  - Encourage maximum independence but intervene and supervise when necessary  - Involve family in performance of ADLs  - Assess for home care needs following discharge   - Consider OT consult to assist with ADL evaluation and planning for discharge  - Provide patient education as appropriate  Outcome: Not Progressing  Goal: Maintains/Returns to pre admission functional level  Description: INTERVENTIONS:  - Perform BMAT or MOVE assessment daily    - Set and communicate daily mobility goal to care team and patient/family/caregiver  - Collaborate with rehabilitation services on mobility goals if consulted    - Out of bed for toileting  - Record patient progress and toleration of activity level   Outcome: Not Progressing     Problem: DISCHARGE PLANNING  Goal: Discharge to home or other facility with appropriate resources  Description: INTERVENTIONS:  - Identify barriers to discharge w/patient and caregiver  - Arrange for needed discharge resources and transportation as appropriate  - Identify discharge learning needs (meds, wound care, etc )  - Arrange for interpretive services to assist at discharge as needed  - Refer to Case Management Department for coordinating discharge planning if the patient needs post-hospital services based on physician/advanced practitioner order or complex needs related to functional status, cognitive ability, or social support system  Outcome: Progressing     Problem: Knowledge Deficit  Goal: Patient/family/caregiver demonstrates understanding of disease process, treatment plan, medications, and discharge instructions  Description: Complete learning assessment and assess knowledge base    Interventions:  - Provide teaching at level of understanding  - Provide teaching via preferred learning methods  Outcome: Progressing     Problem: SAFETY,RESTRAINT: NV/NON-SELF DESTRUCTIVE BEHAVIOR  Goal: Remains free of harm/injury (restraint for non violent/non self-detsructive behavior)  Description: INTERVENTIONS:  - Instruct patient/family regarding restraint use   - Assess and monitor physiologic and psychological status   - Provide interventions and comfort measures to meet assessed patient needs   - Identify and implement measures to help patient regain control  - Assess readiness for release of restraint   Outcome: Progressing  Goal: Returns to optimal restraint-free functioning  Description: INTERVENTIONS:  - Assess the patient's behavior and symptoms that indicate continued need for restraint  - Identify and implement measures to help patient regain control  - Assess readiness for release of restraint   Outcome: Progressing

## 2022-02-21 NOTE — PLAN OF CARE
PT remains intubated, continues with NPO status d/t ileus, PT was tried on trickle TF 2/19 but did not tolerate, PT now NPO x 7 days since 2/15, consider initiating standard TPN, monitor electrolytes advance as tolerated to goal TPN: AA15 600mL, D70 400mL, [id number]% 200ml provide total of 1712cal, 90g pro, 1200mL, glucose load 1 1mg/kg/min, lipid load   2g/kg/day  Will continue to monitor for transition to TF as ileus resolves  Problem: Nutrition/Hydration-ADULT  Goal: Nutrient/Hydration intake appropriate for improving, restoring or maintaining nutritional needs  Description: Monitor and assess patient's nutrition/hydration status for malnutrition  Collaborate with interdisciplinary team and initiate plan and interventions as ordered  Monitor patient's weight and dietary intake as ordered or per policy  Utilize nutrition screening tool and intervene as necessary  Determine patient's food preferences and provide high-protein, high-caloric foods as appropriate       INTERVENTIONS:  - Monitor oral intake, urinary output, labs, and treatment plans  - Assess nutrition and hydration status and recommend course of action  - Evaluate amount of meals eaten  - Assist patient with eating if necessary   - Allow adequate time for meals  - Recommend/ encourage appropriate diets, oral nutritional supplements, and vitamin/mineral supplements  - Order, calculate, and assess calorie counts as needed  - Recommend, monitor, and adjust tube feedings and TPN/PPN based on assessed needs  - Assess need for intravenous fluids  - Provide specific nutrition/hydration education as appropriate  - Include patient/family/caregiver in decisions related to nutrition  Outcome: Not Progressing

## 2022-02-21 NOTE — ASSESSMENT & PLAN NOTE
· Patient PEA cardiac arrest on 2/15  · Etiology secondary to aspiration hypoxia, downtime approximately 12 minutes   · Trend neuro exam as appropriate  · Once of sedation/paralytics need to evaluate his mental status for anoxic injury  · Following the arrest he was following commands, however, he continued to have significant refractory hypoxia which may have lead to secondary injury   · Nimbex discontinued 2/18

## 2022-02-21 NOTE — ASSESSMENT & PLAN NOTE
· Possibly related to prior alcohol abuse vs vascular congestion 2/2 volume overload  · Outpatient follow up with GI

## 2022-02-21 NOTE — ASSESSMENT & PLAN NOTE
Wt Readings from Last 3 Encounters:   02/20/22 (!) 168 kg (371 lb 4 1 oz)   02/12/22 (!) 170 kg (374 lb 12 5 oz)   12/29/21 (!) 152 kg (336 lb)     · Patient noted to be volume overload with a increase in his weight of 20 kg since December 2021 and anasarca  · ECHO 2/16: LVEF 43%  · Continue CVVH with volume removal  · Continue epinephrine for ionotropic support

## 2022-02-21 NOTE — ASSESSMENT & PLAN NOTE
Lab Results   Component Value Date    EGFR 74 02/20/2022    EGFR 80 02/20/2022    EGFR 68 02/20/2022    CREATININE 1 06 02/20/2022    CREATININE 0 99 02/20/2022    CREATININE 1 13 02/20/2022     · Patient admitted to [place] [address] due to mechanical fall and volume overload  · Patient on lasix 80 BID, zaroxolyn and midodrine prior to admission  · Nephrology was consulted and the patient was trialed on a lasix infusion which was transitioned to a bumex infusion  · After he failed to make progress with diuresis;  CVVH was started 2/14

## 2022-02-21 NOTE — ASSESSMENT & PLAN NOTE
· Due to abdominal wall hematoma  · Received 4 units PRBC - Last transfusion on 2/20  · Trend H&H QD likely recent drop is secondary to frequently phlebotomy and volume overload, no obvious source of bleeding at this time   · Was transfused 1 UNIT PRBC for hgb 6 2 on 2/20  · Transfuse for hemoglobin <7 0

## 2022-02-21 NOTE — CONSULTS
Vancomycin therapy has been discontinued  Pharmacy will sign off  Thank you for this consult  Please do not hesitate to call us with questions or re-consult us if the need arises       Marilin Gibbons, PharmD, [facility] Clinical Pharmacist  [phone] or via [place]

## 2022-02-21 NOTE — ASSESSMENT & PLAN NOTE
· Outpatient regimen consists of: metoprolol + coumadin   · Coumadin held due to abdominal wall hematoma and anemia, continue to hold  · Currently on heparin gtt  · Rates into 170's on 2/16 requiring Amio bolus x 2 and then initiated on continuous infusion  · Continue amio infusion 0 5  · Digoxin 62 5mg QoD, last level 1 1

## 2022-02-22 NOTE — ASSESSMENT & PLAN NOTE
· Patient PEA cardiac arrest on 2/15  · Etiology secondary to aspiration hypoxia, downtime approximately 12 minutes   · Trend neuro exam as appropriate  · Once off sedation/paralytics need to evaluate his mental status for anoxic injury  · Following the arrest he was following commands, however, he continued to have significant refractory hypoxia which may have lead to secondary injury   · Nimbex discontinued 2/18  · Dilaudid discontinued 2/21  · 2/21: following simple commands, opens eyes to his name, shakes head yes/no

## 2022-02-22 NOTE — PROGRESS NOTES
[address] [facility]  Progress Note - Marissa Crowley 1960, 60 y o  male MRN: [id number]  Unit/Bed#: ICU 02 Encounter: [id number]  Primary Care Provider: Loly Uriostegui DO   Date and time admitted to hospital: 2/12/2022  6:51 PM    Cardiac arrest [facility])  Assessment & Plan  · Patient PEA cardiac arrest on 2/15  · Etiology secondary to aspiration hypoxia, downtime approximately 12 minutes   · Trend neuro exam as appropriate  · Once off sedation/paralytics need to evaluate his mental status for anoxic injury  · Following the arrest he was following commands, however, he continued to have significant refractory hypoxia which may have lead to secondary injury   · Nimbex discontinued 2/18  · Dilaudid discontinued 2/21  · 2/21: following simple commands, opens eyes to his name, shakes head yes/no      Acute respiratory failure with hypoxia and hypercapnia ([address] )  Assessment & Plan  · Patient with increasing oxygen requirements on 2/15 after episodes of vomiting, he became increasingly lethargic and unable to clear secretions   He was intubated with several aspiration occurrences; as patient was continuously vomiting   · 2/15 and 2/16 bronchoscopy for clearance of excessive secretions   · Mechanical ventilation  · 35/400/60/5  · Goal SpO2> 88%  · IBW TV 450cc for 6cc/kg however peak pressure 40-45 and Pplat 30 limiting increase  · VAP bundle ordered  · Continue nebs  · Continue to trend ABG's  · Dilaudid discontinued 2/21  · Nimbex discontinued 2/18  · Veletri discontinued   · Wean settings as able- our goal will be to maintain his oxygen saturation > 88%      Acute on chronic diastolic (congestive) heart failure ([address] )  Assessment & Plan  Wt Readings from Last 3 Encounters:   02/21/22 (!) 168 kg (371 lb 7 6 oz)   02/12/22 (!) 170 kg (374 lb 12 5 oz)   12/29/21 (!) 152 kg (336 lb)     · Patient noted to be volume overload with a increase in his weight of 20 kg since December 2021 and anasarca  · ECHO 2/16: LVEF 43%  · Continue CVVH with volume removal as able- currently running even  · Continue epinephrine for ionotropic support     * Acute renal failure superimposed on stage 3b chronic kidney disease [facility])  Assessment & Plan  Lab Results   Component Value Date    EGFR 89 02/21/2022    EGFR 84 02/21/2022    EGFR 71 02/21/2022    CREATININE 0 91 02/21/2022    CREATININE 0 95 02/21/2022    CREATININE 1 09 02/21/2022     · Patient admitted to [place] [address] due to mechanical fall and volume overload  · Patient on lasix 80 BID, zaroxolyn and midodrine prior to admission  · Nephrology was consulted and the patient was trialed on a lasix infusion which was transitioned to a bumex infusion  · After he failed to make progress with diuresis; CVVH was started 2/14      Abdominal wall hematoma  Assessment & Plan  · First diagnosed in December 2021 and underwent IR procedure   · Slightly increased in size  · Received 4 unit PRBC  · Trend H/H and transfuse as needed  · If patient continus to bleed may require repeat IR intervention  · Surgery consulted and signed off - no intervention at this time  · Consider re-engaging surgery regarding increased abdominal pressure which may be contributing to ventilator needs   However this is likely multifactorial in setting of anasarca 2/2 volume overload in addition to abdominal wall hematoma and ileus     Permanent atrial fibrillation (HCC)  Assessment & Plan  · Outpatient regimen consists of: metoprolol + coumadin   · Coumadin held due to abdominal wall hematoma and anemia, continue to hold  · Currently on heparin gtt  · Rates into 170's on 2/16 requiring Amio bolus x 2 and then initiated on continuous infusion  · Continue amio infusion 0 5  · Digoxin 62 5mg QoD, last level 1 1      Ileus (HCC)  Assessment & Plan  · OGT to low intermittent suction   · Continue bowel regiment   · Monitor for return of bowel function  · Did not tolerate trial of trickle TF    Urinary retention  Assessment & Plan  · Retrograde urethra gram and fluoroscopically guided urethral catheter placement by IR  · Maintain leahy catheter     Shock ([address] )  Assessment & Plan  · Multifactorial in setting of cardiac arrest, some cardiogenic component as evidenced by decreased EF on ECHO despite inotropic medications, and septic shock which is likely the largest component   · Septic component   · Continue broad spectrum ABX cefepime/vancomycin (days 7 and 9 respectively) through 2/21 for 9 days total   · Levophed 40, Vaso 0 04  · Wean as able for MAP > 65  · Cardiogenic component   · Epi 30  · Continue for cardiogenic support and also BP support, if able to wean down on levophed, would consider weaning down to lower levels  · ECHO revealed EF of 43%, mild to moderate global hypokinesis and pulmonary artery systolic pressure of 54 mmhg   IVC dilated, no retrophasic changes   · Would likely benefit from volume removal however this is limited by hemodynamics       Other cirrhosis of liver (HCC)  Assessment & Plan  · Possibly related to prior alcohol abuse vs vascular congestion 2/2 volume overload  · Outpatient follow up with GI    Acute on chronic anemia  Assessment & Plan  · Due to abdominal wall hematoma  · Received 4 units PRBC - Last transfusion 1unit PRBC on 2/20 for Hgb 6 2  · Trend H&H QD likely recent drop is secondary to frequently phlebotomy and volume overload, no obvious source of bleeding at this time   · Transfuse for hemoglobin <7 0         STEFF (obstructive sleep apnea)  Assessment & Plan  · Currently ventilated  · BIPAP as needed when extubated     Lymphedema  Assessment & Plan  · Chronic due to venous insufficiency and volume overload    ----------------------------------------------------------------------------------------  HPI/24hr events: No events overnight, arousable to name, following simple commands    Patient appropriate for transfer out of the ICU today?: No  Disposition: Continue Critical Care   Code Status: Level 2 - DNAR: but accepts endotracheal intubation  ---------------------------------------------------------------------------------------  SUBJECTIVE  Only intermittently following commands    Review of Systems  Review of systems was unable to be performed secondary to mental status  ---------------------------------------------------------------------------------------  OBJECTIVE    Vitals   Vitals:    22 0500 22 0505 22 0520 22 0530   BP:  (!) 96/49 96/50 90/50   BP Location:    Left arm   Pulse: 80 82 84 82   Resp: (!) 23 (!) 23 (!) 42 21   Temp: 98 6 °F (37 °C) 98 2 °F (36 8 °C) 98 2 °F (36 8 °C) 98 2 °F (36 8 °C)   TempSrc:    Esophageal   SpO2: 90% (!) 88% 90% 91%   Weight:       Height:         Temp (24hrs), Av 6 °F (37 °C), Min:97 5 °F (36 4 °C), Max:99 7 °F (37 6 °C)  Current: Temperature: 98 2 °F (36 8 °C)  Arterial Line BP: 58/30  Arterial Line MAP (mmHg): 40 mmHg    Respiratory:  SpO2: SpO2: 91 %, SpO2 Activity: SpO2 Activity: At Rest, SpO2 Device: O2 Device: Ventilator  Nasal Cannula O2 Flow Rate (L/min): 4 L/min    Invasive/non-invasive ventilation settings   Respiratory  Report   Lab Data (Last 4 hours)    None         O2/Vent Data (Last 4 hours)       0209           Vent Mode AC/VC       Resp Rate (BPM) (BPM) 35       Vt (mL) (mL) 400       FIO2 (%) (%) 60       PEEP (cmH2O) (cmH2O) 5       MV 11                   Physical Exam  Constitutional:       Appearance: He is obese  He is ill-appearing  HENT:      Head: Normocephalic  Mouth/Throat:      Mouth: Mucous membranes are moist    Eyes:      Pupils: Pupils are equal, round, and reactive to light  Cardiovascular:      Rate and Rhythm: Normal rate  Pulmonary:      Breath sounds: Rales present  Abdominal:      General: There is no distension  Musculoskeletal:         General: Swelling present  Cervical back: Neck supple  Lymphadenopathy:      Cervical: No cervical adenopathy     Skin: General: Skin is warm and dry  Findings: Bruising present  Neurological:      Comments: Sleepy this AM, opens eyes             Laboratory and Diagnostics:  Results from last 7 days   Lab Units 02/21/22  0444 02/20/22 2227 02/20/22  0407 02/19/22  1851 02/19/22  1826 02/19/22  0755 02/18/22  0423 02/17/22  9077 02/17/22  3874 02/16/22  1222 02/16/22  1222 02/15/22  2317 02/15/22  2317 02/15/22  0612 02/15/22  0612   WBC Thousand/uL 27 70* 26 78* 23 96* 27 51*  --  31 37* 31 31*  --  29 10*   < > 8 40   < > 13 52*   < > 6 97   HEMOGLOBIN g/dL 7 4* 7 5* 6 2* 7 0* 7 0* 6 7* 6 6*   < > 7 4*   < > 8 1*   < > 7 8*   < > 7 4*   HEMATOCRIT % 24 7* 24 7* 21 4* 23 8*  --  23 2* 22 8*  --  25 2*   < > 28 2*   < > 27 5*   < > 25 0*   PLATELETS Thousands/uL 38* 33* 32* 33*  --  38* 47*  --  67*   < > 128*   < > 115*   < > 100*   NEUTROS PCT %  --   --   --   --   --   --   --   --   --   --   --   --  87*  --  80*   BANDS PCT %  --   --   --   --   --  4  --   --  43*  --  40*  --   --   --   --    MONOS PCT %  --   --   --   --   --   --   --   --   --   --   --   --  4  --  9   MONO PCT %  --   --   --   --   --  4  --   --  9  --  4  --   --   --   --     < > = values in this interval not displayed       Results from last 7 days   Lab Units 02/22/22  0411 02/21/22 2234 02/21/22  1542 02/21/22  1022 02/21/22  0436 02/20/22 2227 02/20/22  1757 02/16/22  2051 02/16/22  1222 02/16/22  0236 02/15/22  2317 02/15/22  1821 02/15/22  1157   SODIUM mmol/L 137 138 143 141 137 136 137   < > 131*   < > 131*   < > 129*   POTASSIUM mmol/L 4 5 4 4 4 6 4 8 4 6 4 5 4 7   < > 3 3*   < > 4 1   < > 3 7   CHLORIDE mmol/L 103 103 107 106 104 103 104   < > 98*   < > 97*   < > 94*   CO2 mmol/L 25 25 23 24 24 26 24   < > 24   < > 29   < > 30   ANION GAP mmol/L 9 10 13 11 9 7 9   < > 9   < > 5   < > 5   BUN mg/dL 13 14 18 13 13 13 13   < > 33*   < > 33*   < > 39*   CREATININE mg/dL 0 90 0 88 0 91 0 95 1 09 1 06 0 99   < > 1 77*   < > 1 74*   < > 1 78*   CALCIUM mg/dL 9 1 9 0 8 6 8 7 9 2 9 0 9 0   < > 8 1*   < > 9 7   < > 8 9   GLUCOSE RANDOM mg/dL 104 106 103 99 96 88 81   < > 116   < > 139   < > 109   ALT U/L  --   --   --   --   --   --   --   --  11*  --  18  --  14   AST U/L  --   --   --   --   --   --   --   --  35  --  30  --  27   ALK PHOS U/L  --   --   --   --   --   --   --   --  84  --  119*  --  125*   ALBUMIN g/dL  --   --   --   --   --   --   --   --  2 4*  --  2 7*  --  2 4*   TOTAL BILIRUBIN mg/dL  --   --   --   --   --   --   --   --  2 40*  --  2 21*  --  1 54*    < > = values in this interval not displayed  Results from last 7 days   Lab Units 02/22/22  0411 02/21/22  2234 02/21/22  1542 02/21/22  1022 02/21/22  0436 02/20/22  2227 02/20/22  1757   MAGNESIUM mg/dL 2 0 2 0 1 9 2 0 2 0 2 0 1 9   PHOSPHORUS mg/dL 2 3 2 1* 2 0* 2 3 2 3 2 1* 1 9*      Results from last 7 days   Lab Units 02/18/22  0423 02/16/22  1351   INR  2 02* 1 40*   PTT seconds 66*  --           Results from last 7 days   Lab Units 02/18/22  2358 02/18/22  0423 02/17/22  1620 02/17/22  1154 02/17/22  0449 02/16/22  2355 02/16/22  1838   LACTIC ACID mmol/L 1 9 1 7 2 3* 2 8* 4 1* 5 1* 5 1*     ABG:  Results from last 7 days   Lab Units 02/21/22  1138   PH ART  7 251*   PCO2 ART mm Hg 54 8*   PO2 ART mm Hg 66 1*   HCO3 ART mmol/L 23 5   BASE EXC ART mmol/L -3 7   ABG SOURCE  Line, Arterial     VBG:  Results from last 7 days   Lab Units 02/21/22  1138 02/20/22  0409 02/19/22  2224   PH ALISIA   --   --  7 262*   PCO2 ALISIA mm Hg  --   --  51 0*   PO2 ALISIA mm Hg  --   --  62 8*   HCO3 ALISIA mmol/L  --   --  22 5*   BASE EXC ALISIA mmol/L  --   --  -4 4   ABG SOURCE  Line, Arterial   < >  --     < > = values in this interval not displayed       Results from last 7 days   Lab Units 02/19/22  0512 02/18/22  0423 02/17/22  0449 02/16/22  1222   PROCALCITONIN ng/ml 64 33* 97 02* 92 31* 27 51*       Micro  Results from last 7 days   Lab Units 02/17/22  1428 02/17/22  1223 02/16/22  0939   BLOOD CULTURE  No Growth After 4 Days  No Growth After 4 Days  --    GRAM STAIN RESULT   --   --  No Polys or Bacteria seen       EKG:   Imaging: I have personally reviewed pertinent reports  and I have personally reviewed pertinent films in PACS    Intake and Output  I/O       02/20 0701 02/21 0700 02/21 0701 02/22 0700    I V  (mL/kg) 4723 6 (28 1) 2827 4 (16 8)    Blood 350     NG/ 200    IV Piggyback 535 330    Total Intake(mL/kg) 5718 6 (34) 3357 4 (20)    Urine (mL/kg/hr) 0 (0) 0 (0)    Emesis/NG output 400 350    Other 7411 3591    Total Output 7811 3941    Net -2092 4 -583 6                Height and Weights   Height: 5' 11" (180 3 cm)  IBW (Ideal Body Weight): 75 3 kg  Body mass index is 51 81 kg/m²  Weight (last 2 days)     Date/Time Weight    02/21/22 0600 168 (371 48)    02/21/22 0400 168 (371 48)    02/20/22 0550 168 (371 25)            Nutrition       Diet Orders   (From admission, onward)             Start     Ordered    02/19/22 2128  Diet NPO  Diet effective now        References:    Nutrtion Support Algorithm Enteral vs  Parenteral   Question Answer Comment   Diet Type NPO    RD to adjust diet per protocol?  Yes        02/19/22 2127                  Active Medications  Scheduled Meds:  Current Facility-Administered Medications   Medication Dose Route Frequency Provider Last Rate    acetaminophen  650 mg Oral Q6H PRN TRES Pickett      albuterol  2 5 mg Nebulization Q6H PRN TRES Pickett      amiodarone  0 5 mg/min Intravenous Continuous Doll TRES Basilio 0 5 mg/min (02/21/22 0601)    bisacodyl  10 mg Rectal Daily Verta Rocky Joseph PA-C      chlorhexidine  15 mL Mouth/Throat Q12H [address] TRES Cai      dexmedetomidine  0 1-0 7 mcg/kg/hr Intravenous Titrated Eder Lopez PA-C 0 4 mcg/kg/hr (02/22/22 0407)    digoxin  62 5 mcg Intravenous Every Other Day Kandace Kocher, CRNP      epinephrine  1-20 mcg/min Intravenous Titrated Aye Ramy Hawkins, CRNP 12 mcg/min (02/21/22 2225)    fentanyl citrate (PF)  50 mcg Intravenous Q1H PRN Elkin Roberto PA-C      heparin (porcine)  500 Units/hr Pre Filter Continuous Esvin Harris  Units/hr (02/21/22 0700)    hydrocortisone sodium succinate  100 mg Intravenous Q8H [address], CRNP      influenza vaccine  0 5 mL Intramuscular Once Klever Delgado MD      lactulose  30 g Oral TID Elkin Roberto PA-C      levalbuterol  1 25 mg Nebulization TID Ara Barbone, CRNP      norepinephrine  1-100 mcg/min Intravenous Titrated Aye Hawkins, CRNP 50 mcg/min (02/22/22 0550)   Lv [place] NxStage K 4/Ca 3  20,000 mL Dialysis Continuous Kelly Malik MD      pantoprazole  40 mg Intravenous Q24H [address] Eufemia Keith, CRNP      polyethylene glycol  17 g Oral BID Aye Ramy Hawkins, TRES      senna-docusate sodium  2 tablet Oral BID Jacquiline Mew, CRNP      sodium phosphate  6 mmol Intravenous Once Thurlow Cockayne, TRES      vasopressin  0 04 Units/min Intravenous Continuous Eufemia Keith, CRNP 0 04 Units/min (02/22/22 0407)     Continuous Infusions:  amiodarone, 0 5 mg/min, Last Rate: 0 5 mg/min (02/21/22 0601)  dexmedetomidine, 0 1-0 7 mcg/kg/hr, Last Rate: 0 4 mcg/kg/hr (02/22/22 0407)  epinephrine, 1-20 mcg/min, Last Rate: 12 mcg/min (02/21/22 2225)  heparin (porcine), 500 Units/hr, Last Rate: 500 Units/hr (02/21/22 0700)  norepinephrine, 1-100 mcg/min, Last Rate: 50 mcg/min (02/22/22 0550)  NxStage K 4/Ca 3, 20,000 mL  vasopressin, 0 04 Units/min, Last Rate: 0 04 Units/min (02/22/22 0407)      PRN Meds:   acetaminophen, 650 mg, Q6H PRN  albuterol, 2 5 mg, Q6H PRN  fentanyl citrate (PF), 50 mcg, Q1H PRN        Invasive Devices Review  Invasive Devices  Report    Central Venous Catheter Line            CVC Central Lines 02/19/22 Triple 20cm Left Internal jugular 2 days          Arterial Line            Arterial Line 02/16/22 Radial 6 days          Hemodialysis Catheter            HD Temporary Double Catheter 8 days          Drain            Urethral Catheter Temperature probe 16 Fr  4 days    NG/OG/Enteral Tube Orogastric 3 days          Airway            ETT  Cuffed;Oral 7 5 mm 6 days                Rationale for remaining devices:   ---------------------------------------------------------------------------------------  Advance Directive and Living Will:      Power of :    POLST:    ---------------------------------------------------------------------------------------  Care Time Delivered:         TRES Espinoza      Portions of the record may have been created with voice recognition software  Occasional wrong word or "sound a like" substitutions may have occurred due to the inherent limitations of voice recognition software    Read the chart carefully and recognize, using context, where substitutions have occurred

## 2022-02-22 NOTE — ASSESSMENT & PLAN NOTE
Lab Results   Component Value Date    EGFR 89 02/21/2022    EGFR 84 02/21/2022    EGFR 71 02/21/2022    CREATININE 0 91 02/21/2022    CREATININE 0 95 02/21/2022    CREATININE 1 09 02/21/2022     · Patient admitted to [place] [address] due to mechanical fall and volume overload  · Patient on lasix 80 BID, zaroxolyn and midodrine prior to admission  · Nephrology was consulted and the patient was trialed on a lasix infusion which was transitioned to a bumex infusion  · After he failed to make progress with diuresis;  CVVH was started 2/14

## 2022-02-22 NOTE — ASSESSMENT & PLAN NOTE
Wt Readings from Last 3 Encounters:   02/21/22 (!) 168 kg (371 lb 7 6 oz)   02/12/22 (!) 170 kg (374 lb 12 5 oz)   12/29/21 (!) 152 kg (336 lb)     · Patient noted to be volume overload with a increase in his weight of 20 kg since December 2021 and anasarca  · ECHO 2/16: LVEF 43%  · Continue CVVH with volume removal as able- currently running even  · Continue epinephrine for ionotropic support

## 2022-02-22 NOTE — ASSESSMENT & PLAN NOTE
· First diagnosed in December 2021 and underwent IR procedure   · Slightly increased in size  · Received 4 unit PRBC  · Trend H/H and transfuse as needed  · If patient continus to bleed may require repeat IR intervention  · Surgery consulted and signed off - no intervention at this time  · Consider re-engaging surgery regarding increased abdominal pressure which may be contributing to ventilator needs   However this is likely multifactorial in setting of anasarca 2/2 volume overload in addition to abdominal wall hematoma and ileus

## 2022-02-22 NOTE — ASSESSMENT & PLAN NOTE
· Due to abdominal wall hematoma  · Received 4 units PRBC - Last transfusion 1unit PRBC on 2/20 for Hgb 6 2  · Trend H&H QD likely recent drop is secondary to frequently phlebotomy and volume overload, no obvious source of bleeding at this time   · Transfuse for hemoglobin <7 0

## 2022-02-22 NOTE — PLAN OF CARE
Problem: Potential for Falls  Goal: Patient will remain free of falls  Description: INTERVENTIONS:  - Educate patient/family on patient safety including physical limitations  - Instruct patient to call for assistance with activity   - Consult OT/PT to assist with strengthening/mobility   - Keep Call bell within reach  - Keep bed low and locked with side rails adjusted as appropriate  - Keep care items and personal belongings within reach  - Initiate and maintain comfort rounds      - Apply yellow socks and bracelet for high fall risk patients  - Consider moving patient to room near nurses station  Outcome: Progressing     Problem: MOBILITY - ADULT  Goal: Maintain or return to baseline ADL function  Description: INTERVENTIONS:  -  Assess patient's ability to carry out ADLs; assess patient's baseline for ADL function and identify physical deficits which impact ability to perform ADLs (bathing, care of mouth/teeth, toileting, grooming, dressing, etc )  - Assess/evaluate cause of self-care deficits   - Assess range of motion  - Assess patient's mobility; develop plan if impaired  - Assess patient's need for assistive devices and provide as appropriate  - Encourage maximum independence but intervene and supervise when necessary  - Involve family in performance of ADLs  - Assess for home care needs following discharge   - Consider OT consult to assist with ADL evaluation and planning for discharge  - Provide patient education as appropriate  Outcome: Not Progressing  Goal: Maintains/Returns to pre admission functional level  Description: INTERVENTIONS:  - Perform BMAT or MOVE assessment daily    - Set and communicate daily mobility goal to care team and patient/family/caregiver     - Collaborate with rehabilitation services on mobility goals if consulted  -     - Out of bed for toileting  - Record patient progress and toleration of activity level   Outcome: Not Progressing     Problem: Prexisting or High Potential for Compromised Skin Integrity  Goal: Skin integrity is maintained or improved  Description: INTERVENTIONS:  - Identify patients at risk for skin breakdown  - Assess and monitor skin integrity  - Assess and monitor nutrition and hydration status  - Monitor labs   - Assess for incontinence   - Turn and reposition patient  - Assist with mobility/ambulation  - Relieve pressure over bony prominences  - Avoid friction and shearing  - Provide appropriate hygiene as needed including keeping skin clean and dry  - Evaluate need for skin moisturizer/barrier cream  - Collaborate with interdisciplinary team   - Patient/family teaching  - Consider wound care consult   Outcome: Progressing     Problem: METABOLIC, FLUID AND ELECTROLYTES - ADULT  Goal: Electrolytes maintained within normal limits  Description: INTERVENTIONS:  - Monitor labs and assess patient for signs and symptoms of electrolyte imbalances  - Administer electrolyte replacement as ordered  - Monitor response to electrolyte replacements, including repeat lab results as appropriate  - Instruct patient on fluid and nutrition as appropriate  Outcome: Not Progressing  Goal: Fluid balance maintained  Description: INTERVENTIONS:  - Monitor labs   - Monitor I/O and WT  - Instruct patient on fluid and nutrition as appropriate  - Assess for signs & symptoms of volume excess or deficit  Outcome: Not Progressing  Goal: Glucose maintained within target range  Description: INTERVENTIONS:  - Monitor Blood Glucose as ordered  - Assess for signs and symptoms of hyperglycemia and hypoglycemia  - Administer ordered medications to maintain glucose within target range  - Assess nutritional intake and initiate nutrition service referral as needed  Outcome: Progressing     Problem: CARDIOVASCULAR - ADULT  Goal: Maintains optimal cardiac output and hemodynamic stability  Description: INTERVENTIONS:  - Monitor I/O, vital signs and rhythm  - Monitor for S/S and trends of decreased cardiac output  - Administer and titrate ordered vasoactive medications to optimize hemodynamic stability  - Assess quality of pulses, skin color and temperature  - Assess for signs of decreased coronary artery perfusion  - Instruct patient to report change in severity of symptoms  Outcome: Not Progressing  Goal: Absence of cardiac dysrhythmias or at baseline rhythm  Description: INTERVENTIONS:  - Continuous cardiac monitoring, vital signs, obtain 12 lead EKG if ordered  - Administer antiarrhythmic and heart rate control medications as ordered  - Monitor electrolytes and administer replacement therapy as ordered  Outcome: Not Progressing     Problem: RESPIRATORY - ADULT  Goal: Achieves optimal ventilation and oxygenation  Description: INTERVENTIONS:  - Assess for changes in respiratory status  - Assess for changes in mentation and behavior  - Position to facilitate oxygenation and minimize respiratory effort  - Oxygen administered by appropriate delivery if ordered  - Initiate smoking cessation education as indicated  - Encourage broncho-pulmonary hygiene including cough, deep breathe, Incentive Spirometry  - Assess the need for suctioning and aspirate as needed  - Assess and instruct to report SOB or any respiratory difficulty  - Respiratory Therapy support as indicated  Outcome: Not Progressing     Problem: HEMATOLOGIC - ADULT  Goal: Maintains hematologic stability  Description: INTERVENTIONS  - Assess for signs and symptoms of bleeding or hemorrhage  - Monitor labs  - Administer supportive blood products/factors as ordered and appropriate  Outcome: Not Progressing     Problem: Nutrition/Hydration-ADULT  Goal: Nutrient/Hydration intake appropriate for improving, restoring or maintaining nutritional needs  Description: Monitor and assess patient's nutrition/hydration status for malnutrition  Collaborate with interdisciplinary team and initiate plan and interventions as ordered    Monitor patient's weight and dietary intake as ordered or per policy  Utilize nutrition screening tool and intervene as necessary  Determine patient's food preferences and provide high-protein, high-caloric foods as appropriate       INTERVENTIONS:  - Monitor oral intake, urinary output, labs, and treatment plans  - Assess nutrition and hydration status and recommend course of action  - Evaluate amount of meals eaten  - Assist patient with eating if necessary   - Allow adequate time for meals  - Recommend/ encourage appropriate diets, oral nutritional supplements, and vitamin/mineral supplements  - Order, calculate, and assess calorie counts as needed  - Recommend, monitor, and adjust tube feedings and TPN/PPN based on assessed needs  - Assess need for intravenous fluids  - Provide specific nutrition/hydration education as appropriate  - Include patient/family/caregiver in decisions related to nutrition  Outcome: Not Progressing     Problem: PAIN - ADULT  Goal: Verbalizes/displays adequate comfort level or baseline comfort level  Description: Interventions:  - Encourage patient to monitor pain and request assistance  - Assess pain using appropriate pain scale  - Administer analgesics based on type and severity of pain and evaluate response  - Implement non-pharmacological measures as appropriate and evaluate response  - Consider cultural and social influences on pain and pain management  - Notify physician/advanced practitioner if interventions unsuccessful or patient reports new pain  Outcome: Progressing     Problem: INFECTION - ADULT  Goal: Absence or prevention of progression during hospitalization  Description: INTERVENTIONS:  - Assess and monitor for signs and symptoms of infection  - Monitor lab/diagnostic results  - Monitor all insertion sites, i e  indwelling lines, tubes, and drains  - Monitor endotracheal if appropriate and nasal secretions for changes in amount and color  - [place] appropriate cooling/warming therapies per order  - Administer medications as ordered  - Instruct and encourage patient and family to use good hand hygiene technique  - Identify and instruct in appropriate isolation precautions for identified infection/condition  Outcome: Not Progressing  Goal: Absence of fever/infection during neutropenic period  Description: INTERVENTIONS:  - Monitor WBC    Outcome: Not Progressing     Problem: SAFETY ADULT  Goal: Patient will remain free of falls  Description: INTERVENTIONS:  - Educate patient/family on patient safety including physical limitations  - Instruct patient to call for assistance with activity   - Consult OT/PT to assist with strengthening/mobility   - Keep Call bell within reach  - Keep bed low and locked with side rails adjusted as appropriate  - Keep care items and personal belongings within reach  - Initiate and maintain comfort rounds  - Make Fall Risk Sign visible to staff      - Apply yellow socks and bracelet for high fall risk patients  - Consider moving patient to room near nurses station  Outcome: Progressing  Goal: Maintain or return to baseline ADL function  Description: INTERVENTIONS:  -  Assess patient's ability to carry out ADLs; assess patient's baseline for ADL function and identify physical deficits which impact ability to perform ADLs (bathing, care of mouth/teeth, toileting, grooming, dressing, etc )  - Assess/evaluate cause of self-care deficits   - Assess range of motion  - Assess patient's mobility; develop plan if impaired  - Assess patient's need for assistive devices and provide as appropriate  - Encourage maximum independence but intervene and supervise when necessary  - Involve family in performance of ADLs  - Assess for home care needs following discharge   - Consider OT consult to assist with ADL evaluation and planning for discharge  - Provide patient education as appropriate  Outcome: Not Progressing  Goal: Maintains/Returns to pre admission functional level  Description: INTERVENTIONS:  - Perform BMAT or MOVE assessment daily    - Set and communicate daily mobility goal to care team and patient/family/caregiver  - Collaborate with rehabilitation services on mobility goals if consulted      - Out of bed for toileting  - Record patient progress and toleration of activity level   Outcome: Not Progressing     Problem: DISCHARGE PLANNING  Goal: Discharge to home or other facility with appropriate resources  Description: INTERVENTIONS:  - Identify barriers to discharge w/patient and caregiver  - Arrange for needed discharge resources and transportation as appropriate  - Identify discharge learning needs (meds, wound care, etc )  - Arrange for interpretive services to assist at discharge as needed  - Refer to Case Management Department for coordinating discharge planning if the patient needs post-hospital services based on physician/advanced practitioner order or complex needs related to functional status, cognitive ability, or social support system  Outcome: Progressing     Problem: Knowledge Deficit  Goal: Patient/family/caregiver demonstrates understanding of disease process, treatment plan, medications, and discharge instructions  Description: Complete learning assessment and assess knowledge base    Interventions:  - Provide teaching at level of understanding  - Provide teaching via preferred learning methods  Outcome: Progressing     Problem: SAFETY,RESTRAINT: NV/NON-SELF DESTRUCTIVE BEHAVIOR  Goal: Remains free of harm/injury (restraint for non violent/non self-detsructive behavior)  Description: INTERVENTIONS:  - Instruct patient/family regarding restraint use   - Assess and monitor physiologic and psychological status   - Provide interventions and comfort measures to meet assessed patient needs   - Identify and implement measures to help patient regain control  - Assess readiness for release of restraint   Outcome: Progressing  Goal: Returns to optimal restraint-free functioning  Description: INTERVENTIONS:  - Assess the patient's behavior and symptoms that indicate continued need for restraint  - Identify and implement measures to help patient regain control  - Assess readiness for release of restraint   Outcome: Progressing     Problem: CONFUSION/THOUGHT DISTURBANCE  Goal: Thought disturbances (confusion, delirium, depression, dementia or psychosis) are managed to maintain or return to baseline mental status and functional level  Description: INTERVENTIONS:  - Assess for possible contributors to  thought disturbance, including but not limited to medications, infection, impaired vision or hearing, underlying metabolic abnormalities, dehydration, respiratory compromise,  psychiatric diagnoses and notify attending PHYSICAN/AP  - Monitor and intervene to maintain adequate nutrition, hydration, elimination, sleep and activity  - Decrease environmental stimuli, including noise as appropriate  - Provide frequent contacts to provide refocusing, direction and reassurance as needed  Approach patient calmly with eye contact and at their level    - [place] high risk fall precautions, aspiration precautions and other safety measures, as indicated  - If delirium suspected, notify physician/AP of change in condition and request immediate in-person evaluation  - Pursue consults as appropriate including Geriatric (campus dependent), OT for cognitive evaluation/activity planning, psychiatric, pastoral care, etc   Outcome: Progressing

## 2022-02-22 NOTE — ASSESSMENT & PLAN NOTE
· Multifactorial in setting of cardiac arrest, some cardiogenic component as evidenced by decreased EF on ECHO despite inotropic medications, and septic shock which is likely the largest component   · Septic component   · Continue broad spectrum ABX cefepime/vancomycin (days 7 and 9 respectively) through 2/21 for 9 days total   · Levophed 40, Vaso 0 04  · Wean as able for MAP > 65  · Cardiogenic component   · Epi 30  · Continue for cardiogenic support and also BP support, if able to wean down on levophed, would consider weaning down to lower levels  · ECHO revealed EF of 43%, mild to moderate global hypokinesis and pulmonary artery systolic pressure of 54 mmhg   IVC dilated, no retrophasic changes   · Would likely benefit from volume removal however this is limited by hemodynamics

## 2022-02-22 NOTE — ASSESSMENT & PLAN NOTE
Wt Readings from Last 3 Encounters:   02/22/22 (!) 171 kg (377 lb 3 3 oz)   02/12/22 (!) 170 kg (374 lb 12 5 oz)   12/29/21 (!) 152 kg (336 lb)     · Patient noted to be volume overload with a increase in his weight of 20 kg since December 2021 and anasarca  · ECHO 2/16: LVEF 43%  · Continue CVVH with volume removal as able- currently running even  · Continue epinephrine for ionotropic support

## 2022-02-22 NOTE — PROGRESS NOTES
Follow up Consultation    Nephrology   Maria Fernanda Bellamy 60 y o  male MRN: [id number]  Unit/Bed#: ICU 02 Encounter: [id number]      Physician Requesting Consult: Kedar Fagan MD        ASSESSMENT/PLAN:  78-year-old male with multiple comorbidities including CKD stage 3, CHF, anemia, lymphedema, STEFF, cirrhosis, AFib admitted status post fall with volume overload nephrology following for acute kidney injury and volume overload  Acute kidney injury on CKD stage 3:  MARISABEL most likely secondary to cardiorenal syndrome plus ischemic injury secondary hypotension plus ERICH plus cardiac arrest and subsequent ATN  After review of records In [facility] as well as Care everywhere it appears that the patient has a baseline Creatinine of 1 6-2 0mg/dL  patient was admitted with a creatinine of 1 91 mg/dL on 02/08/2022  patient's creatinine today is at 0 90 mg/dL  Started on CVVHD on 02/13/2022  Currently has right IJ temporary dialysis catheter in place  For now will continue run even as patient is unable to tolerate UF with running net negative  Will order for running net -25 to 50 an hour as tolerable  Currently code status is DNR for family meeting later today for further goals of care  Continue 4K bath  Getting adequate clearance with CVVHD at this time  check BMP, magnesium, phosphorus as per CVVHD protocol  Await renal recovery  Optimize hemodynamic status to avoid delay in renal recovery  Place on a renal diet when allowed diet order  Avoid nephrotoxins, adjust meds to appropriate GFR  Strict I/O  Daily weights  Urinary retention protocol if patient does not have a Gann  Most likely has underlying CKD secondary to chronic liver disease plus obesity related hyper filtration plus age-related nephron loss  will need to set up patient for follow up with Nephrology as an outpatient post hospitalization    for nephrology as an outpatient patient follows up with Dr Kimberli Nicole    Blood pressure/hypotension:  current medications:  Levophed, vasopressin, epinephrine  recommendations:  Titrate off of pressors as tolerable  Optimize hemodynamics  Maintain MAP > 65mmHg  Avoid BP fluctuations  H/H/anemia:  most recent hemoglobin at 7 0 grams/deciliter  maintain hemoglobin greater than 8 grams/deciliter  Management primary team come PRBC per primary team    Acid-base electrolytes:    Electrolytes:      Hyperphosphatemia/now hypophosphatemia:  On repletion protocol per CVVHD  Continue BMP, phosphorus per protocol    Acid-base:    Most recent bicarb at 25 stable be corrected with dialysis    Other medical problems:  Proteinuria: Most recent UA with no protein as of 2022  AFib:  Management per primary team   On digoxin  Liver cirrhosis:  Follow-up with GI management primary team  Respiratory failure:  Remains intubated management primary team  Septic shock:  Management per primary team   On cefepime, Solu-Cortef, vancomycin  Overall poor prognosis      Thanks for the consult  Will continue to follow  Please call with questions/ concerns  Above-mentioned orders and Plan in terms of acute kidney injury was discussed with the team in [address] [facility] [facility] 10:28 AM              Objective :   Patient seen and examined in his room in the ICU while on CVVHD at 8:27 a m  Running net even total UF 5 3 L yesterday did run slightly net -25 an hour for free hours still remains on multiple pressors for family meeting later today  No urine output    Awake not following commands      PHYSICAL EXAM  BP (!) 94/47   Pulse 84   Temp 98 6 °F (37 °C) (Esophageal)   Resp (!) 25   Ht 5' 11" (1 803 m)   Wt (!) 171 kg (377 lb 3 3 oz)   SpO2 91%   BMI 52 61 kg/m²   Temp (24hrs), Av 5 °F (36 9 °C), Min:97 5 °F (36 4 °C), Max:99 7 °F (37 6 °C)        Intake/Output Summary (Last 24 hours) at 2022 1028  Last data filed at 2022 1000  Gross per 24 hour   Intake 4652 23 ml   Output 5912 ml   Net -1259 77 ml I/O last 24 hours: In: 5336 2 [I V :4620 4; NG/GT:200; IV Piggyback:515 8]  Out: 7474 [Urine:2; Emesis/NG output:825; DQRTW:0358]      Current Weight: Weight - Scale: (!) 171 kg (377 lb 3 3 oz)  First Weight: Weight - Scale: (!) 170 kg (374 lb 1 9 oz)  Physical Exam  Vitals and nursing note reviewed  Constitutional:       General: He is not in acute distress  Appearance: Normal appearance  He is obese  He is ill-appearing  He is not toxic-appearing or diaphoretic  HENT:      Mouth/Throat:      Comments: Intubated  Eyes:      General: No scleral icterus  Neck:      Comments: Right IJ temporary dialysis catheter  Cardiovascular:      Rate and Rhythm: Normal rate  Heart sounds: Normal heart sounds  No friction rub  Pulmonary:      Effort: Pulmonary effort is normal  No respiratory distress  Breath sounds: Normal breath sounds  No stridor  No wheezing  Abdominal:      General: There is no distension  Palpations: Abdomen is soft  There is no mass  Tenderness: There is no abdominal tenderness  Comments: Plus one pitting edema   Musculoskeletal:         General: Swelling present  Cervical back: Neck supple  Comments: Plus three edema bilateral upper lower extremities   Skin:     General: Skin is warm  Coloration: Skin is not jaundiced  Neurological:      Mental Status: He is alert               Review of Systems   Unable to perform ROS: Intubated       Scheduled Meds:  Current Facility-Administered Medications   Medication Dose Route Frequency Provider Last Rate    acetaminophen  650 mg Oral Q6H PRN TRES Cao      albuterol  2 5 mg Nebulization Q6H PRN TRES Cao      amiodarone  0 5 mg/min Intravenous Continuous TRES Evangelista 0 5 mg/min (02/21/22 0601)    bisacodyl  10 mg Rectal Daily Tiffanie Joseph PA-C      chlorhexidine  15 mL Mouth/Throat Q12H [address] TRES Eng      dexmedetomidine  0 1-0 7 mcg/kg/hr Intravenous Titrated [facility], JAVAD 0 4 mcg/kg/hr (02/22/22 0407)    digoxin  62 5 mcg Intravenous Every Other Day TRES Scott      epinephrine  1-20 mcg/min Intravenous Titrated Arlice Downs Sonday, CRNP 20 mcg/min (02/22/22 0941)    fentanyl citrate (PF)  50 mcg Intravenous Q1H PRN [facility], JAVAD      heparin (porcine)  500 Units/hr Pre Filter Continuous Ac Crook  Units/hr (02/21/22 0700)    hydrocortisone sodium succinate  100 mg Intravenous Q8H [address]      influenza vaccine  0 5 mL Intramuscular Once Nuzhat Cantu MD      lactulose  30 g Oral TID [facility], JAVAD      levalbuterol  1 25 mg Nebulization TID TRES Wood      norepinephrine  1-100 mcg/min Intravenous Titrated Arlice Downs Sonday, CRNP 70 mcg/min (02/22/22 0943)   Anahi Alvarado NxStage K 4/Ca 3  20,000 mL Dialysis Continuous Axel Copeland MD      pantoprazole  40 mg Intravenous Q24H [address] Eufemia TRES Parker      polyethylene glycol  17 g Oral BID [facility] Sonday, RTES      senna-docusate sodium  2 tablet Oral BID Saintclair Parson, CRNP      vasopressin  0 04 Units/min Intravenous Continuous TRES Lagunas 0 04 Units/min (02/22/22 1025)       PRN Meds:   acetaminophen    albuterol    fentanyl citrate (PF)    Continuous Infusions:amiodarone, 0 5 mg/min, Last Rate: 0 5 mg/min (02/21/22 0601)  dexmedetomidine, 0 1-0 7 mcg/kg/hr, Last Rate: 0 4 mcg/kg/hr (02/22/22 0407)  epinephrine, 1-20 mcg/min, Last Rate: 20 mcg/min (02/22/22 0941)  heparin (porcine), 500 Units/hr, Last Rate: 500 Units/hr (02/21/22 0700)  norepinephrine, 1-100 mcg/min, Last Rate: 70 mcg/min (02/22/22 0943)  NxStage K 4/Ca 3, 20,000 mL  vasopressin, 0 04 Units/min, Last Rate: 0 04 Units/min (02/22/22 1025)          Invasive Devices:      Invasive Devices  Report    Central Venous Catheter Line            CVC Central Lines 02/19/22 Triple 20cm Left Internal jugular 2 days Arterial Line            Arterial Line 02/16/22 Radial 6 days          Hemodialysis Catheter            HD Temporary Double Catheter 8 days          Drain            NG/OG/Enteral Tube Orogastric 4 days    Urethral Catheter Temperature probe 16 Fr  4 days          Airway            ETT  Cuffed;Oral 7 5 mm 6 days                  LABORATORY:    Results from last 7 days   Lab Units 02/22/22  0550 02/22/22  0411 02/21/22  2234 02/21/22  1542 02/21/22  1022 02/21/22  0444 02/21/22  0436 02/20/22  2227 02/20/22  1757 02/20/22  1141 02/20/22  0407 02/20/22  0001 02/19/22  1851 02/19/22  1826 02/19/22  1151 02/19/22  0755 02/18/22  1222 02/18/22  0423   WBC Thousand/uL 30 19*  --   --   --   --  27 70*  --  26 78*  --   --  23 96*  --  27 51*  --   --  31 37*  --  31 31*   HEMOGLOBIN g/dL 7 0*  --   --   --   --  7 4*  --  7 5*  --   --  6 2*  --  7 0* 7 0*  --  6 7*  --  6 6*   HEMATOCRIT % 23 6*  --   --   --   --  24 7*  --  24 7*  --   --  21 4*  --  23 8*  --   --  23 2*  --  22 8*   PLATELETS Thousands/uL 61*  --   --   --   --  38*  --  33*  --   --  32*  --  33*  --   --  38*  --  47*   POTASSIUM mmol/L  --  4 5 4 4 4 6 4 8  --  4 6 4 5 4 7   < >  --    < >  --  4 7   < >  --    < > 4 8   CHLORIDE mmol/L  --  103 103 107 106  --  104 103 104   < >  --    < >  --  104   < >  --    < > 102   CO2 mmol/L  --  25 25 23 24  --  24 26 24   < >  --    < >  --  24   < >  --    < > 24   BUN mg/dL  --  13 14 18 13  --  13 13 13   < >  --    < >  --  19   < >  --    < > 24   CREATININE mg/dL  --  0 90 0 88 0 91 0 95  --  1 09 1 06 0 99   < >  --    < >  --  1 17   < >  --    < > 1 43*   CALCIUM mg/dL  --  9 1 9 0 8 6 8 7  --  9 2 9 0 9 0   < >  --    < >  --  8 7   < >  --    < > 8 9   MAGNESIUM mg/dL  --  2 0 2 0 1 9 2 0  --  2 0 2 0 1 9   < >  --    < >  --  2 0   < >  --    < > 2 2   PHOSPHORUS mg/dL  --  2 3 2 1* 2 0* 2 3  --  2 3 2 1* 1 9*   < >  --    < >  --  2 0*   < >  --    < > 2 2*    < > = values in this interval not displayed  rest all reviewed    RADIOLOGY:  XR abdomen 1 view kub   Final Result by Be Motta MD (02/21 1017)      Significantly limited study without obvious gaseous distention of bowel  Bilateral pleural effusions are noted               Workstation performed: [id number]         XR chest portable ICU   Final Result by Milad Velazquez MD (02/20 7607)   1  Tubes and lines as above without pneumothorax  2   Stable cardiac megaly and bilateral airspace opacities concerning for pulmonary edema pattern with right basilar pleural effusion  Overall findings consistent with CHF  Workstation performed: [id number]         XR chest portable ICU   Final Result by Kami Lopez MD (02/16 9182)      There is increased diffuse opacification of the left hemithorax which may be due to large pleural effusion, atelectasis, and progressive consolidation  Extensive diffuse consolidation in the right lung appears slightly increased in extent  The study was marked in [facility] for immediate notification  Workstation performed: [id number]         XR chest portable ICU   Final Result by Elida No MD (83/88 4154)      Progressive diffuse bilateral infiltrates suspicious for Covid pneumonia                  Workstation performed: [id number]         X-ray chest 1 view   Final Result by Micky Hodges MD (02/16 3944)         1  ET tube in satisfactory position 4 2 cm above the elton  2   Interval development of an extensive infiltrate in the left lung  3   Abnormal appearance of the right lung with an infiltrate, pleural effusion as well as worsening atelectasis at the right lung base  Workstation performed: [id number]         XR chest portable ICU   Final Result by Melisa Mendoza MD (02/16 3086)      Interval development of the right lung opacification and likely pleural effusion                    Workstation performed: [id number]         CT abdomen pelvis wo contrast   Final Result by Kristal Reeves MD (02/16 0003)      Right lower lobe pneumonia likely secondary to aspiration  Comparing to 2/8/2022, numerous distended loops of small bowel without discrete transition point suggesting ileus  Workstation performed: [id number]         VAS upper limb venous duplex scan, unilateral/limited   Final Result by Alessio Machado MD (02/15 1210)      XR chest portable   Final Result by Lindsay Tyler MD (02/14 4562)      Right IJ line has been placed and tip overlies the SVC  No pneumothorax  Stable findings for small right pleural effusion with associated infiltrate  Workstation performed: [id number]           Rest all reviewed    Portions of the record may have been created with voice recognition software  Occasional wrong word or "sound a like" substitutions may have occurred due to the inherent limitations of voice recognition software  Read the chart carefully and recognize, using context, where substitutions have occurred  If you have any questions, please contact the dictating provider

## 2022-02-22 NOTE — ASSESSMENT & PLAN NOTE
· Patient with increasing oxygen requirements on 2/15 after episodes of vomiting, he became increasingly lethargic and unable to clear secretions   He was intubated with several aspiration occurrences; as patient was continuously vomiting   · 2/15 and 2/16 bronchoscopy for clearance of excessive secretions   · Mechanical ventilation  · 35/400/60/5  · Goal SpO2> 88%  · IBW TV 450cc for 6cc/kg however peak pressure 40-45 and Pplat 30 limiting increase  · VAP bundle ordered  · Continue nebs  · Continue to trend ABG's  · Dilaudid discontinued 2/21  · Nimbex discontinued 2/18  · Veletri discontinued   · Wean settings as able- our goal will be to maintain his oxygen saturation > 88%

## 2022-02-23 NOTE — ASSESSMENT & PLAN NOTE
Lab Results   Component Value Date    EGFR 91 02/22/2022    EGFR 93 02/22/2022    EGFR 90 02/22/2022    CREATININE 0 88 02/22/2022    CREATININE 0 83 02/22/2022    CREATININE 0 90 02/22/2022     · Patient admitted to [place] [address] due to mechanical fall and volume overload  · Patient on lasix 80 BID, zaroxolyn and midodrine prior to admission  · Nephrology was consulted and the patient was trialed on a lasix infusion which was transitioned to a bumex infusion  · After he failed to make progress with diuresis;  CVVH was started 2/14

## 2022-02-23 NOTE — PROGRESS NOTES
Pastoral Care Progress Note    2022  Patient: Mildred Miner : 1960  Admission Date & Time: 2022 1851  MRN: [id number] CSN: [id number]      Called to be with wife patient will be taken off life support today, offer prayers at bedside will continue to support wife as needed

## 2022-02-23 NOTE — PROGRESS NOTES
Pastoral Care Progress Note    2022  Patient: Yuniel Estimable : 1960  Admission Date & Time: 2022 1851  MRN: [id number] CSN: [id number]        Provided  support and prayer during passing of patient  Clarified end of life procedures re:  home  My deepest sympathies

## 2022-02-23 NOTE — PROGRESS NOTES
Follow up Consultation    Nephrology   Burak Mercado 60 y o  male MRN: [id number]  Unit/Bed#: ICU 02 Encounter: [id number]      Physician Requesting Consult: Karla Garcia MD        ASSESSMENT/PLAN:  58-year-old male with multiple comorbidities including CKD stage 3, CHF, anemia, lymphedema, STEFF, cirrhosis, AFib admitted status post fall with volume overload nephrology following for acute kidney injury and volume overload  Acute kidney injury on CKD stage 3:  MARISABEL most likely secondary to cardiorenal syndrome plus ischemic injury secondary hypotension plus ERICH plus cardiac arrest and subsequent ATN  After review of records In [facility] as well as Care everywhere it appears that the patient has a baseline Creatinine of 1 6-2 0mg/dL  patient was admitted with a creatinine of 1 91 mg/dL on 02/08/2022  patient's creatinine today is at 0 83 mg/dL  Started on CVVHD on 02/13/2022  Currently has right IJ temporary dialysis catheter in place  For now will continue run even as patient is unable to tolerate UF with running net negative  Will order for running net -25 an hour as tolerable  Currently code status is DNR for family meeting later today for further goals of care  Possible transition to comfort  Continue 4K bath  Getting adequate clearance with CVVHD at this time  check BMP, magnesium, phosphorus as per CVVHD protocol  Await renal recovery  Optimize hemodynamic status to avoid delay in renal recovery  Place on a renal diet when allowed diet order  Avoid nephrotoxins, adjust meds to appropriate GFR  Strict I/O  Daily weights  Urinary retention protocol if patient does not have a Gann  Most likely has underlying CKD secondary to chronic liver disease plus obesity related hyper filtration plus age-related nephron loss  will need to set up patient for follow up with Nephrology as an outpatient post hospitalization    for nephrology as an outpatient patient follows up with Dr Myke Gillis prognosis    Blood pressure/hypotension:  current medications:  Levophed, vasopressin, epinephrine  recommendations:  Titrate off of pressors as tolerable, unable to tolerate UF yesterday  Optimize hemodynamics  Maintain MAP > 65mmHg  Avoid BP fluctuations  H/H/anemia:  most recent hemoglobin at 7 2 grams/deciliter  maintain hemoglobin greater than 8 grams/deciliter  Management primary team come PRBC per primary team, continue to monitor    Acid-base electrolytes:    Electrolytes:      Hyperphosphatemia/now hypophosphatemia:  On repletion protocol per CVVHD  Continue BMP, phosphorus per protocol  Most recent phosphorus stable at 2 4    Acid-base:    Most recent bicarb at 22 mild acidosis could be from progressive ischemic injury from hypotension persistent  Since plan is for possible compassionate extubation later today will hold off on making further changes  Other medical problems:  Proteinuria: Most recent UA with no protein as of 02/08/2022  AFib:  Management per primary team   On digoxin  Liver cirrhosis:  Follow-up with GI management primary team  Respiratory failure:  Remains intubated management primary team  Septic shock:  Management per primary team   On Solu-Cortef  Overall poor prognosis  Continues to have rising WBC count      Thanks for the consult  Will continue to follow  Please call with questions/ concerns  Above-mentioned orders and Plan in terms of acute kidney injury was discussed with the team in [address] Karyn Melendez MD, FASN, 2/23/2022, 10:19 AM              Objective :   Patient seen and examined the ICU while on CVVHD at 9:00 a m  Running net even still still on multiple pressors unable to tolerate any net negative UF  Plan for family meeting later again today with ICU possible transition to comfort        PHYSICAL EXAM  BP (!) 84/35   Pulse 90   Temp 98 6 °F (37 °C)   Resp (!) 34   Ht 5' 11" (1 803 m)   Wt (!) 169 kg (373 lb 0 3 oz)   SpO2 92%   BMI 52 03 kg/m²   Temp (24hrs), Av 7 °F (37 1 °C), Min:98 2 °F (36 8 °C), Max:99 °F (37 2 °C)        Intake/Output Summary (Last 24 hours) at 2022 1019  Last data filed at 2022 1000  Gross per 24 hour   Intake 6575 01 ml   Output 6304 ml   Net 271 01 ml       I/O last 24 hours: In: 7017 9 [I V :6526 6; NG/GT:180; IV Piggyback:311 3]  Out: 7236 [Urine:2; Emesis/NG output:725; YNEOO:9300]      Current Weight: Weight - Scale: (!) 169 kg (373 lb 0 3 oz)  First Weight: Weight - Scale: (!) 170 kg (374 lb 1 9 oz)  Physical Exam  Vitals and nursing note reviewed  Constitutional:       General: He is not in acute distress  Appearance: Normal appearance  He is obese  He is ill-appearing  He is not toxic-appearing or diaphoretic  HENT:      Mouth/Throat:      Comments: Intubated  Eyes:      General: No scleral icterus  Conjunctiva/sclera: Conjunctivae normal    Neck:      Comments: Right IJ temporary dialysis catheter  Cardiovascular:      Rate and Rhythm: Normal rate  Pulses: Normal pulses  Heart sounds: Normal heart sounds  Pulmonary:      Effort: No respiratory distress  Breath sounds: Normal breath sounds  No wheezing  Abdominal:      General: There is no distension  Palpations: Abdomen is soft  Tenderness: There is no abdominal tenderness  Comments: Plus one pitting edema   Musculoskeletal:         General: Swelling present  Cervical back: No rigidity  Comments: Plus two edema bilateral lower extremities   Skin:     General: Skin is warm  Coloration: Skin is not jaundiced  Neurological:      Mental Status: He is alert               Review of Systems   Unable to perform ROS: Intubated       Scheduled Meds:  Current Facility-Administered Medications   Medication Dose Route Frequency Provider Last Rate    acetaminophen  650 mg Oral Q6H PRN Ramón Liming, CRNP      albuterol  2 5 mg Nebulization Q6H PRN Ramón Liming, CRNP      amiodarone  0 5 mg/min Intravenous Continuous TRES Lee 0 5 mg/min (02/22/22 1614)    bisacodyl  10 mg Rectal Daily Ana Joseph PA-C      chlorhexidine  15 mL Mouth/Throat Q12H [address] TRES Cai      dexmedetomidine  0 1-0 7 mcg/kg/hr Intravenous Titrated Giovanny Boyce PA-C 0 4 mcg/kg/hr (02/23/22 0737)    digoxin  62 5 mcg Intravenous Every Other Day [facility] Heart, TRES      epinephrine  1-20 mcg/min Intravenous Titrated ERNA LeeNP 20 mcg/min (02/23/22 0501)    fentanyl citrate (PF)  50 mcg Intravenous Q1H PRN Giovanny Boyce PA-C      heparin (porcine)  500 Units/hr Pre Filter Continuous Sofi Castro  Units/hr (02/21/22 0700)    hydrocortisone sodium succinate  100 mg Intravenous Q8H [address]      influenza vaccine  0 5 mL Intramuscular Once Makeda Adams MD      lactulose  30 g Oral TID Giovanny Boyce PA-C      levalbuterol  1 25 mg Nebulization TID TRES Easley      norepinephrine  1-100 mcg/min Intravenous Titrated TRES Lee 90 mcg/min (02/23/22 0738)   [place] Ramesh NxStage K 4/Ca 3  20,000 mL Dialysis Continuous Kaitlin Munguia MD      pantoprazole  40 mg Intravenous Q24H [address] TRES [place]      polyethylene glycol  17 g Oral BID TRES Lee      senna-docusate sodium  2 tablet Oral BID TRES Ma      sodium chloride  3 mL Nebulization TID Michael Browning MD      vasopressin  0 04 Units/min Intravenous Continuous TRES [place] 0 04 Units/min (02/23/22 0639)       PRN Meds:   acetaminophen    albuterol    fentanyl citrate (PF)    Continuous Infusions:amiodarone, 0 5 mg/min, Last Rate: 0 5 mg/min (02/22/22 1614)  dexmedetomidine, 0 1-0 7 mcg/kg/hr, Last Rate: 0 4 mcg/kg/hr (02/23/22 0737)  epinephrine, 1-20 mcg/min, Last Rate: 20 mcg/min (02/23/22 0501)  heparin (porcine), 500 Units/hr, Last Rate: 500 Units/hr (02/21/22 0700)  norepinephrine, 1-100 mcg/min, Last Rate: 90 mcg/min (02/23/22 4441)  NxStage K 4/Ca 3, 20,000 mL  vasopressin, 0 04 Units/min, Last Rate: 0 04 Units/min (02/23/22 7255)          Invasive Devices:      Invasive Devices  Report    Central Venous Catheter Line            CVC Central Lines 02/19/22 Triple 20cm Left Internal jugular 3 days          Arterial Line            Arterial Line 02/16/22 Radial 7 days          Hemodialysis Catheter            HD Temporary Double Catheter 9 days          Drain            NG/OG/Enteral Tube Orogastric 5 days    Urethral Catheter Temperature probe 16 Fr  5 days          Airway            ETT  Cuffed;Oral 7 5 mm 7 days                  LABORATORY:    Results from last 7 days   Lab Units 02/23/22  0444 02/23/22  0443 02/22/22  2211 02/22/22  1555 02/22/22  1019 02/22/22  0550 02/22/22  0411 02/21/22  2234 02/21/22  1542 02/21/22  1022 02/21/22  0444 02/21/22  0436 02/20/22  2227 02/20/22  1141 02/20/22  0407 02/20/22  0001 02/19/22  1851 02/19/22  1826 02/19/22  1151 02/19/22  0755   WBC Thousand/uL 31 86*  --   --   --   --  30 19*  --   --   --   --  27 70*  --  26 78*  --  23 96*  --  27 51*  --   --  31 37*   HEMOGLOBIN g/dL 7 3*  --   --   --   --  7 0*  --   --   --   --  7 4*  --  7 5*  --  6 2*  --  7 0* 7 0*   < > 6 7*   HEMATOCRIT % 24 6*  --   --   --   --  23 6*  --   --   --   --  24 7*  --  24 7*  --  21 4*  --  23 8*  --   --  23 2*   PLATELETS Thousands/uL 35*  --   --   --   --  61*  --   --   --   --  38*  --  33*  --  32*  --  33*  --   --  38*   POTASSIUM mmol/L  --  4 5 4 5 4 6 4 6  --  4 5 4 4 4 6   < >  --    < > 4 5   < >  --    < >  --  4 7   < >  --    CHLORIDE mmol/L  --  103 103 102 103  --  103 103 107   < >  --    < > 103   < >  --    < >  --  104   < >  --    CO2 mmol/L  --  22 23 23 25  --  25 25 23   < >  --    < > 26   < >  --    < >  --  24   < >  --    BUN mg/dL  --  17 18 15 14  --  13 14 18   < >  --    < > 13   < >  --    < >  --  19   < >  --    CREATININE mg/dL  --  0 83 0 90 0 88 0 83 --  0 90 0 88 0 91   < >  --    < > 1 06   < >  --    < >  --  1 17   < >  --    CALCIUM mg/dL  --  8 6 8 5 8 5 8 7  --  9 1 9 0 8 6   < >  --    < > 9 0   < >  --    < >  --  8 7   < >  --    MAGNESIUM mg/dL  --  1 9 1 9 2 0 2 0  --  2 0 2 0 1 9   < >  --    < > 2 0   < >  --    < >  --  2 0   < >  --    PHOSPHORUS mg/dL  --  2 4 2 7 2 8 2 9  --  2 3 2 1* 2 0*   < >  --    < > 2 1*   < >  --    < >  --  2 0*   < >  --     < > = values in this interval not displayed  rest all reviewed    RADIOLOGY:  XR abdomen 1 view kub   Final Result by Michael Gray MD (02/21 6927)      Significantly limited study without obvious gaseous distention of bowel  Bilateral pleural effusions are noted               Workstation performed: [id number]         XR chest portable ICU   Final Result by Neetu Torres MD (02/20 4437)   1  Tubes and lines as above without pneumothorax  2   Stable cardiac megaly and bilateral airspace opacities concerning for pulmonary edema pattern with right basilar pleural effusion  Overall findings consistent with CHF  Workstation performed: [id number]         XR chest portable ICU   Final Result by Sandee Diaz MD (02/16 3339)      There is increased diffuse opacification of the left hemithorax which may be due to large pleural effusion, atelectasis, and progressive consolidation  Extensive diffuse consolidation in the right lung appears slightly increased in extent  The study was marked in [facility] for immediate notification  Workstation performed: [id number]         XR chest portable ICU   Final Result by Mirna Aguilera MD (40/94 1737)      Progressive diffuse bilateral infiltrates suspicious for Covid pneumonia                  Workstation performed: [id number]         X-ray chest 1 view   Final Result by Kinza Villalpando MD (02/16 8473)         1  ET tube in satisfactory position 4 2 cm above the elton     2   Interval development of an extensive infiltrate in the left lung  3   Abnormal appearance of the right lung with an infiltrate, pleural effusion as well as worsening atelectasis at the right lung base  Workstation performed: [id number]         XR chest portable ICU   Final Result by Melisa Mendoza MD (02/16 7254)      Interval development of the right lung opacification and likely pleural effusion  Workstation performed: [id number]         CT abdomen pelvis wo contrast   Final Result by Dilan Jansen MD (02/16 0003)      Right lower lobe pneumonia likely secondary to aspiration  Comparing to 2/8/2022, numerous distended loops of small bowel without discrete transition point suggesting ileus  Workstation performed: [id number]         VAS upper limb venous duplex scan, unilateral/limited   Final Result by Hawa Cr MD (02/15 1210)      XR chest portable   Final Result by Dagmar Zapien MD (02/14 1702)      Right IJ line has been placed and tip overlies the SVC  No pneumothorax  Stable findings for small right pleural effusion with associated infiltrate  Workstation performed: [id number]           Rest all reviewed    Portions of the record may have been created with voice recognition software  Occasional wrong word or "sound a like" substitutions may have occurred due to the inherent limitations of voice recognition software  Read the chart carefully and recognize, using context, where substitutions have occurred  If you have any questions, please contact the dictating provider

## 2022-02-23 NOTE — PROGRESS NOTES
Family meeting    Conducted family meeting yesterday with patient's spouse, [facility]  Meeting attended by advanced practitioners, patient's nurse and myself  We updated the patient's wife regarding his clinical condition  She understands that he is very ill with multi-system dysfunction/failure and has a very poor chance for survival   She stated that she has already said her goodbyes and plans on transition to comfort measures  However she was going to contact the patient's sister for further support  Plan will be for her to come in on 02/23 for visitation and then also potential transition to comfort measures  Additional critical care time equals 20 minutes spent reviewing chart, conducting family meeting and reassessing the patient

## 2022-02-23 NOTE — PROGRESS NOTES
[facility]  Progress Note - Jacquelyn Pal 1960, 60 y o  male MRN: [id number]  Unit/Bed#: ICU 02 Encounter: [id number]  Primary Care Provider: Becky Miranda DO   Date and time admitted to hospital: 2/12/2022  6:51 PM    Cardiac arrest [facility])  Assessment & Plan  · Patient PEA cardiac arrest on 2/15  · Etiology secondary to aspiration hypoxia, downtime approximately 12 minutes   · Trend neuro exam as appropriate  · Once off sedation/paralytics need to evaluate his mental status for anoxic injury  · Following the arrest he was following commands, however, he continued to have significant refractory hypoxia which may have lead to secondary injury   · Nimbex discontinued 2/18  · Dilaudid discontinued 2/21  · 2/21: following simple commands, opens eyes to his name, shakes head yes/no  · [address] 63 meeting with wife and sister scheduled for 2/23      Acute respiratory failure with hypoxia and hypercapnia ([facility] [address] )  Assessment & Plan  · Patient with increasing oxygen requirements on 2/15 after episodes of vomiting, he became increasingly lethargic and unable to clear secretions   He was intubated with several aspiration occurrences; as patient was continuously vomiting   · 2/15 and 2/16 bronchoscopy for clearance of excessive secretions   · Mechanical ventilation  · 30/400/60/5  · Goal SpO2> 88%  · IBW TV 450cc for 6cc/kg however peak pressure 40-45 and Pplat 30 limiting increase  · VAP bundle ordered  · Continue nebs  · Continue to trend ABG's  · Dilaudid discontinued 2/21  · Nimbex discontinued 2/18  · Veletri discontinued   · Wean settings as able- our goal will be to maintain his oxygen saturation > 88%  · GOC discussion with wife and sister scheduled for 2/23      Acute on chronic diastolic (congestive) heart failure [facility])  Assessment & Plan  Wt Readings from Last 3 Encounters:   02/22/22 (!) 171 kg (377 lb 3 3 oz)   02/12/22 (!) 170 kg (374 lb 12 5 oz)   12/29/21 (!) 152 kg (336 lb) · Patient noted to be volume overload with a increase in his weight of 20 kg since December 2021 and anasarca  · ECHO 2/16: LVEF 43%  · Continue CVVH with volume removal as able- currently running even  · Continue epinephrine for ionotropic support     * Acute renal failure superimposed on stage 3b chronic kidney disease [facility])  Assessment & Plan  Lab Results   Component Value Date    EGFR 91 02/22/2022    EGFR 93 02/22/2022    EGFR 90 02/22/2022    CREATININE 0 88 02/22/2022    CREATININE 0 83 02/22/2022    CREATININE 0 90 02/22/2022     · Patient admitted to [place] [address] due to mechanical fall and volume overload  · Patient on lasix 80 BID, zaroxolyn and midodrine prior to admission  · Nephrology was consulted and the patient was trialed on a lasix infusion which was transitioned to a bumex infusion  · After he failed to make progress with diuresis; CVVH was started 2/14      Abdominal wall hematoma  Assessment & Plan  · First diagnosed in December 2021 and underwent IR procedure   · Slightly increased in size  · Received 4 unit PRBC  · Trend H/H and transfuse as needed  · If patient continus to bleed may require repeat IR intervention  · Surgery consulted and signed off - no intervention at this time  · Consider re-engaging surgery regarding increased abdominal pressure which may be contributing to ventilator needs   However this is likely multifactorial in setting of anasarca 2/2 volume overload in addition to abdominal wall hematoma and ileus     Permanent atrial fibrillation (HCC)  Assessment & Plan  · Outpatient regimen consists of: metoprolol + coumadin   · Coumadin held due to abdominal wall hematoma and anemia, continue to hold  · Currently on heparin gtt  · Rates into 170's on 2/16 requiring Amio bolus x 2 and then initiated on continuous infusion  · Continue amio infusion 0 5  · Digoxin 62 5mg QoD, last level 1 1      Ileus (HCC)  Assessment & Plan  · OGT to low intermittent suction   · Continue bowel regiment   · Monitor for return of bowel function  · Did not tolerate trial of trickle TF    Urinary retention  Assessment & Plan  · Retrograde urethra gram and fluoroscopically guided urethral catheter placement by IR  · Maintain leahy catheter     Shock ([address] )  Assessment & Plan  · Multifactorial in setting of cardiac arrest, some cardiogenic component as evidenced by decreased EF on ECHO despite inotropic medications, and septic shock which is likely the largest component   · Septic component   · Continue broad spectrum ABX cefepime/vancomycin (days 7 and 9 respectively) through 2/21 for 9 days total   · Levophed 80, Vaso 0 04  · Wean as able for MAP > 65  · Cardiogenic component   · Epi 20  · Continue for cardiogenic support and also BP support, if able to wean down on levophed, would consider weaning down to lower levels  · ECHO revealed EF of 43%, mild to moderate global hypokinesis and pulmonary artery systolic pressure of 54 mmhg   IVC dilated, no retrophasic changes   · Would likely benefit from volume removal however this is limited by hemodynamics       Other cirrhosis of liver (HCC)  Assessment & Plan  · Possibly related to prior alcohol abuse vs vascular congestion 2/2 volume overload  · Outpatient follow up with GI    Acute on chronic anemia  Assessment & Plan  · Due to abdominal wall hematoma  · Received 4 units PRBC - Last transfusion 1unit PRBC on 2/20 for Hgb 6 2  · Trend H&H QD likely recent drop is secondary to frequently phlebotomy and volume overload, no obvious source of bleeding at this time   · Transfuse for hemoglobin <7 0         STEFF (obstructive sleep apnea)  Assessment & Plan  · Currently ventilated  · BIPAP as needed when extubated     Lymphedema  Assessment & Plan  · Chronic due to venous insufficiency and volume overload      ----------------------------------------------------------------------------------------  HPI/24hr events: No events overnight, minimally responsive    Patient appropriate for transfer out of the ICU today?: No  Disposition: Continue Critical Care   Code Status: Level 2 - DNAR: but accepts endotracheal intubation  ---------------------------------------------------------------------------------------  SUBJECTIVE  Minimally responsive    Review of Systems  Review of systems was unable to be performed secondary to mental status  ---------------------------------------------------------------------------------------  OBJECTIVE    Vitals   Vitals:    22 0300 22 0330 22 0340 22 0536   BP:       BP Location:       Pulse: 92 92     Resp: (!) 35 (!) 36     Temp: 98 2 °F (36 8 °C) 98 6 °F (37 °C)     TempSrc:       SpO2: 94% 95% 94%    Weight:    (!) 169 kg (373 lb 0 3 oz)   Height:         Temp (24hrs), Av 6 °F (37 °C), Min:97 5 °F (36 4 °C), Max:99 °F (37 2 °C)  Current: Temperature: 98 6 °F (37 °C)  Arterial Line BP: 82/50  Arterial Line MAP (mmHg): 62 mmHg    Respiratory:  SpO2: SpO2: 94 %, SpO2 Activity: SpO2 Activity: At Rest, SpO2 Device: O2 Device: Ventilator  Nasal Cannula O2 Flow Rate (L/min): 4 L/min    Invasive/non-invasive ventilation settings   Respiratory  Report   Lab Data (Last 4 hours)       0444            pH, Arterial       7 233             pCO2, Arterial       52 1             pO2, Arterial       71 6             HCO3, Arterial       21 5             Base Excess, Arterial       -5 8                  O2/Vent Data     None                Physical Exam  Constitutional:       Appearance: He is obese  He is ill-appearing  HENT:      Head: Normocephalic  Mouth/Throat:      Mouth: Mucous membranes are dry  Eyes:      Comments: Scleral edema, conjunctiva are injected   Cardiovascular:      Rate and Rhythm: Normal rate  Pulses: Normal pulses  Pulmonary:      Breath sounds: Rhonchi and rales present  Abdominal:      General: There is distension  Tenderness: There is no abdominal tenderness     Musculoskeletal: General: Swelling present  Cervical back: Neck supple  Comments: Chronic lymph edema bilateral lower extremities   Skin:     General: Skin is warm and dry  Comments: multiple areas of ecchymosis   Neurological:      Comments: Minimally responsive             Laboratory and Diagnostics:  Results from last 7 days   Lab Units 02/22/22  0550 02/21/22  0444 02/20/22  2227 02/20/22  0407 02/19/22  1851 02/19/22  1826 02/19/22  0755 02/18/22  0423 02/18/22  0423 02/17/22  0821 02/17/22  0821 02/16/22  1222 02/16/22  1222   WBC Thousand/uL 30 19* 27 70* 26 78* 23 96* 27 51*  --  31 37*  --  31 31*   < > 29 10*   < > 8 40   HEMOGLOBIN g/dL 7 0* 7 4* 7 5* 6 2* 7 0* 7 0* 6 7*   < > 6 6*   < > 7 4*   < > 8 1*   HEMATOCRIT % 23 6* 24 7* 24 7* 21 4* 23 8*  --  23 2*  --  22 8*   < > 25 2*   < > 28 2*   PLATELETS Thousands/uL 61* 38* 33* 32* 33*  --  38*  --  47*   < > 67*   < > 128*   BANDS PCT % 14*  --   --   --   --   --  4  --   --   --  43*  --  40*   MONO PCT % 5  --   --   --   --   --  4  --   --   --  9  --  4    < > = values in this interval not displayed       Results from last 7 days   Lab Units 02/23/22  0443 02/22/22 2211 02/22/22  1555 02/22/22  1019 02/22/22  0411 02/21/22  2234 02/21/22  1542 02/16/22  2051 02/16/22  1222   SODIUM mmol/L 136 136 136 137 137 138 143   < > 131*   POTASSIUM mmol/L 4 5 4 5 4 6 4 6 4 5 4 4 4 6   < > 3 3*   CHLORIDE mmol/L 103 103 102 103 103 103 107   < > 98*   CO2 mmol/L 22 23 23 25 25 25 23   < > 24   ANION GAP mmol/L 11 10 11 9 9 10 13   < > 9   BUN mg/dL 17 18 15 14 13 14 18   < > 33*   CREATININE mg/dL 0 83 0 90 0 88 0 83 0 90 0 88 0 91   < > 1 77*   CALCIUM mg/dL 8 6 8 5 8 5 8 7 9 1 9 0 8 6   < > 8 1*   GLUCOSE RANDOM mg/dL 86 85 85 91 104 106 103   < > 116   ALT U/L  --   --   --   --   --   --   --   --  11*   AST U/L  --   --   --   --   --   --   --   --  35   ALK PHOS U/L  --   --   --   --   --   --   --   --  84   ALBUMIN g/dL  --   --   --   --   --   -- --   --  2 4*   TOTAL BILIRUBIN mg/dL  --   --   --   --   --   --   --   --  2 40*    < > = values in this interval not displayed  Results from last 7 days   Lab Units 02/23/22  0443 02/22/22  2211 02/22/22  1555 02/22/22  1019 02/22/22  0411 02/21/22  2234 02/21/22  1542   MAGNESIUM mg/dL 1 9 1 9 2 0 2 0 2 0 2 0 1 9   PHOSPHORUS mg/dL 2 4 2 7 2 8 2 9 2 3 2 1* 2 0*      Results from last 7 days   Lab Units 02/18/22  0423 02/16/22  1351   INR  2 02* 1 40*   PTT seconds 66*  --           Results from last 7 days   Lab Units 02/18/22  2358 02/18/22  0423 02/17/22  1620 02/17/22  1154 02/17/22  0449 02/16/22  2355 02/16/22  1838   LACTIC ACID mmol/L 1 9 1 7 2 3* 2 8* 4 1* 5 1* 5 1*     ABG:  Results from last 7 days   Lab Units 02/23/22  0444   PH ART  7 233*   PCO2 ART mm Hg 52 1*   PO2 ART mm Hg 71 6*   HCO3 ART mmol/L 21 5*   BASE EXC ART mmol/L -5 8   ABG SOURCE  Line, Arterial     VBG:  Results from last 7 days   Lab Units 02/23/22  0444 02/20/22  0409 02/19/22  2224   PH ALISIA   --   --  7 262*   PCO2 ALISIA mm Hg  --   --  51 0*   PO2 ALISIA mm Hg  --   --  62 8*   HCO3 ALISIA mmol/L  --   --  22 5*   BASE EXC ALISIA mmol/L  --   --  -4 4   ABG SOURCE  Line, Arterial   < >  --     < > = values in this interval not displayed  Results from last 7 days   Lab Units 02/19/22  0512 02/18/22  0423 02/17/22  0449 02/16/22  1222   PROCALCITONIN ng/ml 64 33* 97 02* 92 31* 27 51*       Micro  Results from last 7 days   Lab Units 02/17/22  1428 02/17/22  1223 02/16/22  0939   BLOOD CULTURE  No Growth After 5 Days  No Growth After 5 Days  --    GRAM STAIN RESULT   --   --  No Polys or Bacteria seen       EKG:   Imaging: I have personally reviewed pertinent reports     and I have personally reviewed pertinent films in PACS    Intake and Output  I/O       02/21 0701 02/22 0700 02/22 0701 02/23 0700    I V  (mL/kg) 4177 5 (24 4) 3601 3 (21 1)    NG/ 120    IV Piggyback 515 8 113 3    Total Intake(mL/kg) 4893 3 (28 6) 3834 6 (22  4)    Urine (mL/kg/hr) 0 (0) 2 (0)    Emesis/NG output 800 725    Other 5059 3507    Stool  0    Total Output 5859 4234    Net -965 7 -399 4          Unmeasured Stool Occurrence  1 x          Height and Weights   Height: 5' 11" (180 3 cm)  IBW (Ideal Body Weight): 75 3 kg  Body mass index is 52 03 kg/m²  Weight (last 2 days)     Date/Time Weight    02/23/22 0536 169 (373 02)    02/22/22 0630 171 (377 21)    02/21/22 0600 168 (371 48)    02/21/22 0400 168 (371 48)            Nutrition       Diet Orders   (From admission, onward)             Start     Ordered    02/19/22 2128  Diet NPO  Diet effective now        References:    Nutrtion Support Algorithm Enteral vs  Parenteral   Question Answer Comment   Diet Type NPO    RD to adjust diet per protocol?  Yes        02/19/22 2127                  Active Medications  Scheduled Meds:  Current Facility-Administered Medications   Medication Dose Route Frequency Provider Last Rate    acetaminophen  650 mg Oral Q6H PRN Ace TRES Panda      albuterol  2 5 mg Nebulization Q6H PRN Ace TRES Panda      amiodarone  0 5 mg/min Intravenous Continuous TRES Neves 0 5 mg/min (02/22/22 1614)    bisacodyl  10 mg Rectal Daily Mira Joseph PA-C      chlorhexidine  15 mL Mouth/Throat Q12H [address] TRES Cai      dexmedetomidine  0 1-0 7 mcg/kg/hr Intravenous Titrated IRAM GuerraC 0 4 mcg/kg/hr (02/23/22 0206)    digoxin  62 5 mcg Intravenous Every Other Day TRES Gonzalez      epinephrine  1-20 mcg/min Intravenous Titrated TRES Neves 20 mcg/min (02/23/22 0501)    fentanyl citrate (PF)  50 mcg Intravenous Q1H PRN Jadiel Rivera PA-C      heparin (porcine)  500 Units/hr Pre Filter Continuous Lydia Chua  Units/hr (02/21/22 0700)    hydrocortisone sodium succinate  100 mg Intravenous Q8H [address] TRES Gonzalez      influenza vaccine  0 5 mL Intramuscular Once Alfredo Alexander MD  lactulose  30 g Oral TID Abimael Mello PA-C      levalbuterol  1 25 mg Nebulization TID TRES Hickey      norepinephrine  1-100 mcg/min Intravenous Titrated ERNA CuellarNP 90 mcg/min (02/23/22 0404)   [facility] NxStage K 4/Ca 3  20,000 mL Dialysis Continuous Olga Lidia Kennedy MD      pantoprazole  40 mg Intravenous Q24H [address] TRES Lagunas      polyethylene glycol  17 g Oral BID TRES Cuellar      senna-docusate sodium  2 tablet Oral BID TRES Hernandez      sodium chloride  3 mL Nebulization TID María Carbajal MD      vasopressin  0 04 Units/min Intravenous Continuous TRES Lagunas 0 04 Units/min (02/22/22 2153)     Continuous Infusions:  amiodarone, 0 5 mg/min, Last Rate: 0 5 mg/min (02/22/22 1614)  dexmedetomidine, 0 1-0 7 mcg/kg/hr, Last Rate: 0 4 mcg/kg/hr (02/23/22 0206)  epinephrine, 1-20 mcg/min, Last Rate: 20 mcg/min (02/23/22 0501)  heparin (porcine), 500 Units/hr, Last Rate: 500 Units/hr (02/21/22 0700)  norepinephrine, 1-100 mcg/min, Last Rate: 90 mcg/min (02/23/22 0404)  NxStage K 4/Ca 3, 20,000 mL  vasopressin, 0 04 Units/min, Last Rate: 0 04 Units/min (02/22/22 2153)      PRN Meds:   acetaminophen, 650 mg, Q6H PRN  albuterol, 2 5 mg, Q6H PRN  fentanyl citrate (PF), 50 mcg, Q1H PRN        Invasive Devices Review  Invasive Devices  Report    Central Venous Catheter Line            CVC Central Lines 02/19/22 Triple 20cm Left Internal jugular 3 days          Arterial Line            Arterial Line 02/16/22 Radial 7 days          Hemodialysis Catheter            HD Temporary Double Catheter 9 days          Drain            Urethral Catheter Temperature probe 16 Fr  5 days    NG/OG/Enteral Tube Orogastric 4 days          Airway            ETT  Cuffed;Oral 7 5 mm 7 days                Rationale for remaining devices:   ---------------------------------------------------------------------------------------  Advance Directive and Living Will:      Power of :    POLST:    ---------------------------------------------------------------------------------------  Care Time Delivered:         TRES Oneil      Portions of the record may have been created with voice recognition software  Occasional wrong word or "sound a like" substitutions may have occurred due to the inherent limitations of voice recognition software    Read the chart carefully and recognize, using context, where substitutions have occurred

## 2022-02-23 NOTE — DISCHARGE SUMMARY
Discharge Summary - Sonja Olmos 60 y o  male MRN: [id number]    Unit/Bed#: ICU 02 Encounter: [id number] PCP: Baron Barclay DO    Admission Date:   Admission Orders (From admission, onward)     Ordered        02/12/22 1904  Inpatient Admission  Once                        Admitting Diagnosis: CHF (congestive heart failure) ([facility] [address] ) [I50 9]    HPI: As per TRSE Mccain "Sonja Olmos is a 57 y o  male with chronic kidney disease, CHF, atrial fibrillation presented to [place] [address] after a mechanical fall and volume overload  The patient was noted to have anisarca and ongoing weight gain despite adjustment in his diuretic regime  He was admitted to the hospital and placed on a lasix infusion which was transitioned to a bumex infusion when the patients' urine output remained minimal  The patients urine output continued to be suboptimal and therefore the patient was transferred to [address] [place] [address] for possible CVVH versus dialysis"    Procedures Performed:   Orders Placed This Encounter   Procedures    Central Line    Temporary HD Catheter       Summary of Hospital Course: Patient presented to [facility] on 2/8/2022 following a mechanical fall at  Patient was found to be in volume overload on arrival with anisarca and weight gain despite increases in his diruetics  Patient was admitted and started on a furosemide drip which was then transitioned to a Bumex drip due to continued minimal urine output  Patient remained with minimal urine output on Bumex drip so was transferred to [address] [place] [address] on 2/14/2022 for CVVH  CRRT was initiated on 2/14/2022  Patient developed an ileus and this lead to vomiting as well as aspiration prompting intubation on 2/15/2022  Patient had a PEA arrest during intubation secondary to hypoxia and being a difficult airway  Patient had a 12 minute down time   Patient had been requiring large doses of multiple vasopressors including Levophed, vasopressin and Epinephrine to treat his multifactorial shock since that time  A second bronch was performed on 2022 for clearance of secretions  Patient continued to deteriorate despite maximal support so today his wife made the decision to transition him to comfort measures only  Significant Findings, Care, Treatment and Services Provided:   · 2/15/22 CT C/A/P: "Right lower lobe pneumonia likely secondary to aspiration   Comparing to 2022, numerous distended loops of small bowel without discrete transition point suggesting ileus "  · 2/15/22: CXR: "Interval development of the right lung opacification and likely pleural effusion "  · 22: Echo: LVEF 43%, LV mildly dilated, mild-moderate global hypokinesis, left atrium moderately dilated, moderate TV regurgitation, pulmonary artery systolic pressure 54  · Sputum culture from  resulted Staph aureus and MRSA on   · MRSA culture positive    Complications: None    Disposition:      Final Diagnosis: Shock    Medical Problems             Resolved Problems  Date Reviewed: 2022          Resolved    Hyponatremia 2022     Resolved by  Chyrl Height, PA-C    Overview Signed 2021  1:48 PM by Yue Bagley DO     - improving   - monitor in the setting of ongoing diuresis         RLL pneumonia 2022     Resolved by  Chyrl Height, PA-C    Emesis 2022     Resolved by  Chyrl Height, PA-C                Condition at Time of Death: Comfort measures only    Date, Time and Cause of Death    Date of Death: 22  Time of Death:  1:37 PM  Preliminary Cause of Death: Shock [facility])  Entered by: TRES Starkey[EN1 1]     Attribution     EN1 1 TRES Starkey 22 13:42          Death Note:    INPATIENT DEATH NOTE  Dolores Link 60 y o  male MRN: [id number]  Unit/Bed#: ICU 02 Encounter: [id number]    Date, Time and Cause of Death    Date of Death: 22  Time of Death:  1:37 PM  Preliminary Cause of Death: Shock ([address] )  Entered by: TRES Acevedo[EN1 1]     Attribution     EN1 1 TRES Acevedo 22 13:42           Patient's Information  Pronounced by: TRES Acevedo  Did the patient's death occur in the ED?: No  Did the patient's death occur in the OR?: No  Did the patient's death occur less than 10 days post-op?: No  Did the patient's death occur within 24 hours of admission?: No  Was code status DNR at the time of death?: Yes    PHYSICAL EXAM:  Unresponsive to noxious stimuli, Spontaneous respirations absent, Breath sounds absent, Carotid pulse absent, Heart sounds absent, Pupillary light reflex absent and Corneal blink reflex absent    Medical Examiner notification criteria:  NONE APPLICABLE   Medical Examiner's office notified?:  No   Medical Examiner accepted case?:  No  Name of Medical Examiner: N/A    Family Notification  Was the family notified?: Yes  Date Notified: 22  Time Notified: 67  Notified by: Kale JOSHUA  Name of Family Notified of Death: Leonela Mcneal   Relationship to Patient: Spouse  Family Notification Route:  At bedside  Was the family told to contact a  home?: Yes  Name of [facility][de-identified] will call back with name of  home    Autopsy Options:  Post-mortem examination declined by next of kin    Primary Service Attending Physician notified?:  yes - Attending:  Dr Padmini Pineda  Physician/Resident responsible for completing Discharge Summary:  TRES Acevedo

## 2022-02-23 NOTE — PLAN OF CARE
Problem: RESPIRATORY - ADULT  Goal: Achieves optimal ventilation and oxygenation  Description: INTERVENTIONS:  - Assess for changes in respiratory status  - Assess for changes in mentation and behavior  - Position to facilitate oxygenation and minimize respiratory effort  - Oxygen administered by appropriate delivery if ordered  - Initiate smoking cessation education as indicated  - Encourage broncho-pulmonary hygiene including cough, deep breathe, Incentive Spirometry  - Assess the need for suctioning and aspirate as needed  - Assess and instruct to report SOB or any respiratory difficulty  - Respiratory Therapy support as indicated  Outcome: Progressing     Problem: SAFETY,RESTRAINT: NV/NON-SELF DESTRUCTIVE BEHAVIOR  Goal: Remains free of harm/injury (restraint for non violent/non self-detsructive behavior)  Description: INTERVENTIONS:  - Instruct patient/family regarding restraint use   - Assess and monitor physiologic and psychological status   - Provide interventions and comfort measures to meet assessed patient needs   - Identify and implement measures to help patient regain control  - Assess readiness for release of restraint   Outcome: Progressing     Problem: SAFETY,RESTRAINT: NV/NON-SELF DESTRUCTIVE BEHAVIOR  Goal: Returns to optimal restraint-free functioning  Description: INTERVENTIONS:  - Assess the patient's behavior and symptoms that indicate continued need for restraint  - Identify and implement measures to help patient regain control  - Assess readiness for release of restraint   Outcome: Progressing     Problem: CARDIOVASCULAR - ADULT  Goal: Maintains optimal cardiac output and hemodynamic stability  Description: INTERVENTIONS:  - Monitor I/O, vital signs and rhythm  - Monitor for S/S and trends of decreased cardiac output  - Administer and titrate ordered vasoactive medications to optimize hemodynamic stability  - Assess quality of pulses, skin color and temperature  - Assess for signs of decreased coronary artery perfusion  - Instruct patient to report change in severity of symptoms  Outcome: Progressing     Problem: INFECTION - ADULT  Goal: Absence or prevention of progression during hospitalization  Description: INTERVENTIONS:  - Assess and monitor for signs and symptoms of infection  - Monitor lab/diagnostic results  - Monitor all insertion sites, i e  indwelling lines, tubes, and drains  - Monitor endotracheal if appropriate and nasal secretions for changes in amount and color  - [place] appropriate cooling/warming therapies per order  - Administer medications as ordered  - Instruct and encourage patient and family to use good hand hygiene technique  - Identify and instruct in appropriate isolation precautions for identified infection/condition  Outcome: Progressing

## 2022-02-23 NOTE — ASSESSMENT & PLAN NOTE
· Multifactorial in setting of cardiac arrest, some cardiogenic component as evidenced by decreased EF on ECHO despite inotropic medications, and septic shock which is likely the largest component   · Septic component   · Continue broad spectrum ABX cefepime/vancomycin (days 7 and 9 respectively) through 2/21 for 9 days total   · Levophed 80, Vaso 0 04  · Wean as able for MAP > 65  · Cardiogenic component   · Epi 20  · Continue for cardiogenic support and also BP support, if able to wean down on levophed, would consider weaning down to lower levels  · ECHO revealed EF of 43%, mild to moderate global hypokinesis and pulmonary artery systolic pressure of 54 mmhg   IVC dilated, no retrophasic changes   · Would likely benefit from volume removal however this is limited by hemodynamics

## 2022-02-23 NOTE — PROGRESS NOTES
02/23/22 1200   Clinical Encounter Type   Visited With Family   Routine Visit Follow-up   Referral From Nurse   Referral To    Scientology Encounters   Scientology Needs Prayer   Sacramental Encounters   Sacrament of Sick-Anointing Anointed

## 2022-02-23 NOTE — ASSESSMENT & PLAN NOTE
· Patient PEA cardiac arrest on 2/15  · Etiology secondary to aspiration hypoxia, downtime approximately 12 minutes   · Trend neuro exam as appropriate  · Once off sedation/paralytics need to evaluate his mental status for anoxic injury  · Following the arrest he was following commands, however, he continued to have significant refractory hypoxia which may have lead to secondary injury   · Nimbex discontinued 2/18  · Dilaudid discontinued 2/21  · 2/21: following simple commands, opens eyes to his name, shakes head yes/no  · [address] meeting with wife and sister scheduled for 2/23

## 2022-02-23 NOTE — ASSESSMENT & PLAN NOTE
· Retrograde urethra gram and fluoroscopically guided urethral catheter placement by IR  · Maintain leayh catheter

## 2022-02-23 NOTE — PROGRESS NOTES
02/23/22 1400   Clinical Encounter Type   Visited With Family   Routine Visit Follow-up   Denominational Encounters   Denominational Needs Prayer

## 2022-02-23 NOTE — DEATH NOTE
INPATIENT DEATH NOTE  Andrés Baez 60 y o  male MRN: [id number]  Unit/Bed#: ICU 02 Encounter: [id number]    Date, Time and Cause of Death    Date of Death: 22  Time of Death:  1:37 PM  Preliminary Cause of Death: Shock ([address] )  Entered by: TRES Lovett[EN1 1]     Attribution     EN1 1 TRES Lovett 22 13:42           Patient's Information  Pronounced by: TRES Lovett  Did the patient's death occur in the ED?: No  Did the patient's death occur in the OR?: No  Did the patient's death occur less than 10 days post-op?: No  Did the patient's death occur within 24 hours of admission?: No  Was code status DNR at the time of death?: Yes    PHYSICAL EXAM:  Unresponsive to noxious stimuli, Spontaneous respirations absent, Breath sounds absent, Carotid pulse absent, Heart sounds absent, Pupillary light reflex absent and Corneal blink reflex absent    Medical Examiner notification criteria:  NONE APPLICABLE   Medical Examiner's office notified?:  No   Medical Examiner accepted case?:  No  Name of Medical Examiner: N/A    Family Notification  Was the family notified?: Yes  Date Notified: 22  Time Notified: 4103  Notified by: Lupillo Mckeon  Name of Family Notified of Death: Sarbjit Cunningham   Relationship to Patient: Spouse  Family Notification Route:  At bedside  Was the family told to contact a  home?: Yes  Name of [facility][de-identified] will call back with name of  home    Autopsy Options:  Post-mortem examination declined by next of kin    Primary Service Attending Physician notified?:  yes - Attending:  Dr Kaur Andrea  Physician/Resident responsible for completing Discharge Summary:  TRES Lovett

## 2022-02-23 NOTE — ASSESSMENT & PLAN NOTE
· Patient with increasing oxygen requirements on 2/15 after episodes of vomiting, he became increasingly lethargic and unable to clear secretions   He was intubated with several aspiration occurrences; as patient was continuously vomiting   · 2/15 and 2/16 bronchoscopy for clearance of excessive secretions   · Mechanical ventilation  · 30/400/60/5  · Goal SpO2> 88%  · IBW TV 450cc for 6cc/kg however peak pressure 40-45 and Pplat 30 limiting increase  · VAP bundle ordered  · Continue nebs  · Continue to trend ABG's  · Dilaudid discontinued 2/21  · Nimbex discontinued 2/18  · Veletri discontinued   · Wean settings as able- our goal will be to maintain his oxygen saturation > 88%  · GOC discussion with wife and sister scheduled for 2/23

## 2022-03-04 NOTE — CLINICAL RISK MANAGEMENT
Progress Note - Death in Restraints   Charla Dubin 60 y o  male MRN: [id number]  Unit/Bed#: ICU 02 Encounter: [id number]      Patient  within 24 hours of restraint  with Soft restraint right wrist and Soft restraint left wrist  Death unrelated to use of restraints  This situation was tracked internally  CMS and PA-OCTAVIO  notification not required

## 2022-03-16 DIAGNOSIS — I48.21 PERMANENT ATRIAL FIBRILLATION (HCC): ICD-10-CM

## 2022-03-16 RX ORDER — METOPROLOL SUCCINATE 25 MG/1
TABLET, EXTENDED RELEASE ORAL
Qty: 180 TABLET | Refills: 1 | OUTPATIENT
Start: 2022-03-16

## 2022-04-01 NOTE — PROGRESS NOTES
Continue to trend random vanco level on a daily basis (28 6 today)   Will continue to pulse dose when level is under 20 most likely Yes

## 2022-07-04 NOTE — ACP (ADVANCE CARE PLANNING)
I had a meeting with the wife Leandra Castle and the family friend Latrice Elliott, I explained to the wife that the patient is still very critically ill, requiring significant amount of vasopressors to support his cardiovascular system, and also significant mechanical support with his pneumonia/ARDS  He still has significant volume overload/anasarca and currently we are trying mild volume removal with CVVH  He also has multiple episodes of worsening anemia/drop in hemoglobin requiring blood transfusion and cannot rule out GI bleed or intra-abdominal PE or worsening of his abdominal wall hematoma at this time  Unknown mental status/possible anoxic brain injury due to code but patient was moving extremities after the code  With all the above we discussed further management plans and the wife agreed to continue with current management plan and have another meeting in 48 hours (on Tuesday morning) for further discussions, if he does not have improvement then she may consider comfort care/withdrawal of care  Patient remains DNR at this time  I answered all their questions to satisfaction  Added critical care time 15 minutes  73

## 2023-03-31 NOTE — ASSESSMENT & PLAN NOTE
Take medication as prescribed. Follow-up with your primary care provider. Return to the ER for any new, worsening, or concerning symptoms. · Continue PO coreg  · The patient developed bradycardia, and the digoxin decreased to every other day dosing by Cardiology on 3/5/21  · The digoxin was discontinued on 03/08/2021 by Cardiology  · Continue anticoagulation with PO coumadin for a goal INR of 2-3  · Outpatient follow-up with Cardiology  Results for [facility]rey [place] (MRN [id number]) as of 2/24/2021 12:39   Ref   Range 2/23/2021 05:27   DIGOXIN LEVEL Latest Ref Range: 0 8 - 2 0 ng/mL 1 7

## 2023-06-22 NOTE — ASSESSMENT & PLAN NOTE
History of AFib, presented with RVR in the setting of sepsis  Chronically on metoprolol succinate twice daily and AC with warfarin  · Required Cardizem gtt for rate control initially - weaned off  · Reintroduced metoprolol with tartrate formulation previously - increased dose again 9/17  · Warfarin was held in the setting of worsening anemia and previously supratherapeutic INR  · Resumed warfarin 9/18  · Increased coumadin to 7 mg PO Qdaily on 09/21/2021  · Continue to monitor heart rate with cardiopulmonary monitoring  · Heart rate is overall improved but remains mildly suboptimal - hold off on further titration of beta-blocker given mild hypotension in the setting of aggressive diuresis  Reason for Visit:   Chief Complaint   Patient presents with   • HIV   • Follow-up   • Medication Management       Vitals:Blood pressure 122/86, pulse (!) 102, temperature 98.1 °F (36.7 °C), weight 103.4 kg (228 lb), SpO2 98 %.     Patient Concerns or Questions: No    Medications Verified: medications verified, no change    Refill for Medication Needed: No    Forms to be filled out for patient: No    : No

## 2024-04-23 NOTE — ASSESSMENT & PLAN NOTE
Pediatric Endocrinology Follow-up Consultation    Patient: Jesus Peace MRN# [id number]   YOB: 2016 Age: 7year 5month old   Date of Visit: Apr 23, 2024    Dear Colleague:    I had the pleasure of seeing your patient, Jesus Peace in the Pediatric Endocrinology Clinic, [facility], on Apr 23, 2024 for a follow-up consultation of short stature.           Problem list:     Patient Active Problem List    Diagnosis Date Noted    Mixed obsessional thoughts and acts 04/17/2024     Priority: Medium    Generalized anxiety disorder 01/31/2024     Priority: Medium    GHD (growth hormone deficiency) (H24) 08/04/2023     Priority: Medium    Short stature (child) 08/04/2023     Priority: Medium    Fetal alcohol spectrum disorder (H28) 05/02/2022     Priority: Medium    Low IGF-1 level 11/05/2021     Priority: Medium    Counseling on behalf of another 09/17/2020     Priority: Medium    Anxiety 06/25/2019     Priority: Medium     Overview:   Dr. Jimenez, Ph D, behavioral problems    Formatting of this note might be different from the original.  Dr. 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   On  · Received miralax 17 grams PO x 1 dose on 02/24/2021  · The constipation has improved    · Continue colace 100 mg PO BID and dulcolax 10 mg PO Qdaily after dinner review of growth charts at the initial visit, stature had been below the 3rd percentile since the age of 2, weight had also been below the 3rd percentile, BMI was at the 24th percentile as of 9/20/20.   Good appetite, no vomiting, diarrhea, or constipation. Norbert has had two asthma exacerbations within the past year, requiring oral steroids. Also on Flovent 44 mcg/act 2 puffs twice daily.   No significant known family history.   Laboratory evaluation after initial visit was notable for low normal growth factors, normal AM cortisol. At our follow up visit on 05/27/21, continued growth deceleration was noted and therefore after further AM laboratory testing a GH stimulation test was recommended. Growth hormone stimulation test was performed on 11/08/21, indicating a peak growth hormone on 9.8 micrograms/L. Family chose to observe his growth until our last visit in 08/2023, when continued growth deceleration was noted in the setting of well controlled asthma. GH therapy was started in 10/2023.      Interim History: Since last seen in clinic in 11/2023, Norbert continues on GH at 0.5 mg daily (0.19 mg/kg/week). Due to the GH shortage, Norbert was not on GH from the end of January until two weeks ago. Otherwise, tolerating injections well, dad administers the injections and alternates sites daily between thighs and buttocks. No headaches, no vision changes, no vomiting, no hip or knee pain. No fatigue, no abdominal pain, constipation, or diarrhea.   Norbert's asthma has been adequately controlled on daily Flovent. Flovent was decreased to 44 mcg/act 2 puffs twice daily from Flovent 110 mcg/act 1 puff twice daily after our last visit. Will be starting on Dupixent soon and mom anticipates his Flovent will be weaned off by this summer. He had one asthma flare in 02/2024, for which he needed a course of oral steroids.   On review of growth charts, height is at 0.13 percentile (z-score: -3.02), up from 0.07 percentile (z-score:  -3.19) at the last visit. Height velocity is at  6.89 cm/yr (96th%). BMI is stable at the 11th percentile.         History was obtained from patient's mother.      35 minutes spent on the date of the encounter doing chart review, history and exam, documentation and further activities per the note            Social History:   Went home with biological great aunt and uncle until 5 months of age. Now lives with adoptive parents, 7 y/o brother 2.4 y/o brother. Has one biological half-sibling who lives in [place]. In 2nd grade, has an IEP in place.         Family History:   Father's height: 65 in  Mother's height: 60 in    Family history was reviewed and is unchanged. Refer to the initial note.         Allergies:     Allergies   Allergen Reactions    Dog Epithelium (Canis Lupus Familiaris) Other (See Comments)     Blood test done and it shows he's allergic to dog    Other Environmental Allergy      See 6/1/23 [place] allergy panel.              Medications:     Current Outpatient Medications   Medication Sig Dispense Refill    albuterol (PROAIR HFA/PROVENTIL HFA/VENTOLIN HFA) 108 (90 Base) MCG/ACT inhaler Inhale 2 puffs into the lungs every 6 hours as needed for shortness of breath or wheezing 18 g 0    albuterol (PROVENTIL) (2.5 MG/3ML) 0.083% neb solution Take 1 vial (2.5 mg) by nebulization every 4 hours as needed for shortness of breath / dyspnea or wheezing 90 mL 3    dupilumab (DUPIXENT) 300 MG/2ML prefilled syringe Inject 2 mLs (300 mg) Subcutaneous every 28 (twenty-eight) days 2 mL 11    fluticasone (FLOVENT HFA) 44 MCG/ACT inhaler Inhale 2 puffs into the lungs 2 times daily Via Spacer 10.6 g 11    lisdexamfetamine (VYVANSE) 10 MG capsule Take 1 capsule (10 mg) by mouth daily for 30 days 30 capsule 0    [START ON 5/18/2024] lisdexamfetamine (VYVANSE) 10 MG capsule Take 1 capsule (10 mg) by mouth daily for 30 days 30 capsule 0    [START ON 6/18/2024] lisdexamfetamine (VYVANSE) 10 MG capsule Take 1 capsule (10  "mg) by mouth daily for 30 days 30 capsule 0    lisdexamfetamine (VYVANSE) 10 MG capsule Take 1 capsule (10 mg) by mouth every morning for 30 days 30 capsule 0    MELATONIN GUMMIES PO Take 1 mg by mouth At Bedtime      NORDITROPIN FLEXPRO 15 MG/1.5ML SOPN Inject 0.5 mg Subcutaneous daily 1.5 mL 2    sertraline (ZOLOFT) 20 MG/ML (HIGH CONC) solution Take 1.25 mLs (25 mg) by mouth daily Follow-up with Dr. Majano weekly regarding dosing 60 mL 0    atomoxetine (STRATTERA) 18 MG capsule Take 1 capsule (18 mg) by mouth daily (with breakfast) (Patient not taking: Reported on 2024) 30 capsule 1             Review of Systems:   Gen: Negative  Eye: Negative  ENT: Negative  Pulmonary:  Asthma  Cardio: Negative  Gastrointestinal: Negative  Hematologic: Negative  Genitourinary: Negative  Musculoskeletal: Negative  Psychiatric: Anxiety  Neurologic: ADHD, sensory processing disorder  Skin: Negative  Endocrine: see HPI.            Physical Exam:   Blood pressure 100/67, pulse 96, height 1.135 m (3' 8.67\"), weight 18.4 kg (40 lb 9 oz), SpO2 98%.  Blood pressure %cassy are 80% systolic and 92% diastolic based on the 2017 AAP Clinical Practice Guideline. Blood pressure %ile targets: 90%: 104/67, 95%: 109/70, 95% + 12 mmH/82. This reading is in the elevated blood pressure range (BP >= 90th %ile).  Height: 113.5 cm  (0\") <1 %ile (Z= -3.02) based on CDC (Boys, 2-20 Years) Stature-for-age data based on Stature recorded on 2024.  Weight: 18.4 kg (actual weight), <1 %ile (Z= -3.15) based on CDC (Boys, 2-20 Years) weight-for-age data using vitals from 2024.  BMI: Body mass index is 14.29 kg/m . 11 %ile (Z= -1.22) based on CDC (Boys, 2-20 Years) BMI-for-age based on BMI available as of 2024.        Constitutional: awake, alert, cooperative, no apparent distress  Eyes: Lids and lashes normal, sclera clear, conjunctiva normal  ENT: Normocephalic, without obvious abnormality, external ears without lesions,   Neck: Supple, " symmetrical, trachea midline, thyroid symmetric, not enlarged and no tenderness  Hematologic / Lymphatic: no cervical lymphadenopathy  Lungs: No increased work of breathing, clear to auscultation bilaterally with good air entry.  Cardiovascular: Regular rate and rhythm, no murmurs.  Abdomen: No scars, normal bowel sounds, soft, non-distended, non-tender, no masses palpated, no hepatosplenomegaly  Genitourinary:  Breasts I  Genitalia Pre-pubertal testicles; No micropenis  Pubic hair: Tramaine stage I  Musculoskeletal: There is no redness, warmth, or swelling of the joints.    Neurologic: Awake, alert, oriented to name, place and time.  Neuropsychiatric: normal  Skin: no lesions        Laboratory results:     Component      Latest Ref [facility] 11/16/2023  11:53 AM   IGF Binding Protein3      1.6 - 6.7 ug/mL 5.1    IGF Binding Protein 3 SD Score 0.8    Insulin Growth Factor 1 (External)      62 - 347 ng/mL 102    Insulin Growth Factor I SD Score (External)      -2.0 - 2.0 SD -1.0    T4 Free      1.00 - 1.70 ng/dL 1.16          Brain/ Pituitary MRI without and with contrast     History: GH deficiency; GHD (growth hormone deficiency) (H).  ICD-10: GHD (growth hormone deficiency) (H)     Comparison:  None available      Technique: Axial diffusion and FLAIR images of the whole brain  obtained without intravenous contrast. Sagittal T1 and T2-weighted,  coronal T2-weighted, coronal T1-weighted images with focus on the  sella were obtained without intravenous contrast. Post intravenous  contrast using gadolinium coronal and sagittal T1-weighted images were  obtained focused on the sella. Dynamic postcontrast coronal  T1-weighted images were also obtained.     Contrast: 1.6ml iv Gadavist     Findings:    Pituitary gland measures 11 x 3.4 x 6.4 with calculated pituitary  volume 124.4, within normal limits for this age. T1 bright spot of  posterior pituitary is present. No mass is demonstrated within the  sella. The pituitary stalk  "appears midline. The optic chiasm appears  intact and not displaced. The major cavernous carotid vascular  flow-voids appear patent.       T2 hyperintense foci within the right anterior frontal white matter,  could be partial volume versus nonspecific, sick,  infectious/inflammatory process, vasculopathy or demyelinating  process. Otherwise, FLAIR images through the whole brain are  unremarkable, and demonstrate no mass effect, midline shift, or  significant enlargement of the ventricles.     Incidentally noted diffuse increased cervical spinal cord signal on  sagittal T2 weighted images.     Polypoid mucosal thickening bilateral maxillary sinuses, right greater  than left; left sphenoid sinus and right ethmoidal cells.                                                                      Impression:  1. Pituitary gland is within normal limits. No evidence of mass within  the sella.   2. Incidentally noted diffuse increased cervical spinal cord signal on  sagittal T2 weighted images, indeterminate, recommend dedicated  cervical spine MRI for further evaluation.  3. Inflammatory sinus disease    On discussion with Dr. Sims, he thinks the cervical cord signal \"is all artifactual in the spinal cord and brainstem on sagittal T2, caused by CSF pulsations. If there is nothing else going on to make you worried about spinal cord pathology, perhaps doesn't need a referral or a dedicated C spine MRI.\"      Component      Latest Ref Rn 6/1/2023  3:30 PM   IGF Binding Protein3      1.4 - 5.9 ug/mL 3.5    IGF Binding Protein 3 SD Score -0.2    Insulin Growth Factor 1 (External)      48 - 298 ng/mL 65    Insulin Growth Factor I SD Score (External)      -2.0 - 2.0 SD -1.4    TSH      0.60 - 4.80 uIU/mL 1.74    T4 Free      1.00 - 1.70 ng/dL 1.35    Tissue Transglutaminase Antibody IgA      <7.0 U/mL 0.9    IGA      34 - 305 mg/dL 196    Sed Rate      0 - 15 mm/hr 10    Adrenal Corticotropin      <47 pg/mL 18    Cortisol Serum   "      ug/dL 11.2        Results for orders placed or performed in visit on 11/08/21   Human growth hormone     Status: None   Result Value Ref Range     Human Growth Hormone 0.4 ug/L   Igf binding protein 3     Status: None   Result Value Ref Range     IGF Binding Protein3 3.7 1.3 - 5.6 ug/mL     IGF Binding Protein 3 SD Score 0.2     Human growth hormone     Status: None   Result Value Ref Range     Human Growth Hormone 0.7 ug/L   Human growth hormone     Status: None   Result Value Ref Range     Human Growth Hormone 9.8 ug/L   Human growth hormone     Status: None   Result Value Ref Range     Human Growth Hormone 5.9 ug/L   Human growth hormone     Status: None   Result Value Ref Range     Human Growth Hormone 2.1 ug/L   Human growth hormone     Status: None   Result Value Ref Range     Human Growth Hormone 3.0 ug/L   Glucose by meter     Status: Normal   Result Value Ref Range     GLUCOSE BY METER POCT 89 70 - 99 mg/dL   Human growth hormone     Status: None   Result Value Ref Range     Human Growth Hormone 3.6 ug/L   Human growth hormone     Status: None   Result Value Ref Range     Human Growth Hormone 2.1 ug/L   Human growth hormone     Status: None   Result Value Ref Range     Human Growth Hormone 0.9 ug/L   Glucose by meter     Status: Normal   Result Value Ref Range     GLUCOSE BY METER POCT 91 70 - 99 mg/dL   Human growth hormone     Status: None   Result Value Ref Range     Human Growth Hormone 1.5 ug/L     Results for orders placed or performed in visit on 05/28/21   Cortisol serum AM     Status: None   Result Value Ref Range     Cortisol Serum 12.5 ug/dL   Insulin-Like Growth Factor 1 Ped     Status: None   Result Value Ref Range     IGF-1 35  ng/mL   IGFBP-3     Status: None   Result Value Ref Range     IGF Binding Protein3 3.0 1.1 - 5.2 ug/mL     IGF Binding Protein 3 SD Score NEG 0.2        Results for orders placed or performed in visit on 12/04/20   T4 free     Status: None   Result Value Ref  Range     T4 Free 0.94 0.76 - 1.46 ng/dL   TSH     Status: None   Result Value Ref Range     TSH 2.25 0.40 - 4.00 mU/L   Tissue transglutaminase destini IgA and IgG [[id number]]     Status: None   Result Value Ref Range     Tissue Transglutaminase Antibody IgA 1 <7 U/mL     Tissue Transglutaminase Destini IgG 1 <7 U/mL   Comprehensive metabolic panel     Status: None   Result Value Ref Range     Sodium 139 133 - 143 mmol/L     Potassium 4.2 3.4 - 5.3 mmol/L     Chloride 107 98 - 110 mmol/L     Carbon Dioxide 27 20 - 32 mmol/L     Anion Gap 5 3 - 14 mmol/L     Glucose 98 70 - 99 mg/dL     Urea Nitrogen 13 9 - 22 mg/dL     Creatinine 0.34 0.15 - 0.53 mg/dL     Calcium 8.8 8.5 - 10.1 mg/dL     Bilirubin Total 0.2 0.2 - 1.3 mg/dL     Albumin 4.1 3.4 - 5.0 g/dL     Protein Total 7.2 6.5 - 8.4 g/dL     Alkaline Phosphatase 234 150 - 420 U/L     ALT 32 0 - 50 U/L     AST 38 0 - 50 U/L   Insulin-Like Growth Factor 1 Ped     Status: None   Result Value Ref Range     IGF-1 39  ng/mL   IGFBP-3     Status: None   Result Value Ref Range     IGF Binding Protein3 2.7 1.1 - 5.2 ug/mL     IGF Binding Protein 3 SD Score NEG 0.5     IgA [LAB73]     Status: None   Result Value Ref Range      27 - 195 mg/dL               Assessment and Plan:   Norbert is a 7year 5month old boy, adopted, with PMH of fetal drug exposure now presenting for follow up of short stature. While the short stature can be partially due to lingering effects of poor prenatal care in utero, we evaluated him for medical causes of short stature. Prior testing was negative for thyroid disease, celiac disease, kidney and liver dysfunction. Growth factors were low normal and a GH stimulation test produced a mildly sub-optimal GH peak. Given his continued growth decleration in the setting of well controlled asthma and no frequent oral steroids along with a normal cortisol level, GH therapy at 0.5 mg/ day was started in 10/2023.   Despite a break in GH administration from  end of 01/2024 until two weeks ago due to GH shortage, Norbert is overall growing well. We will obtain growth factors today and adjust dose as needed and follow up in four months.           No orders of the defined types were placed in this encounter.        A return evaluation will be scheduled for: 4 months    Thank you for allowing me to participate in the care of your patient.  Please do not hesitate to call with questions or concerns.    Sincerely,    Santana Jolly MD   Attending Physician  Division of Diabetes and Endocrinology  [facility]  Patient Care Team:  Summer Haro MD as PCP - General (Pediatrics)  Lynn Urban MD as MD (Pediatric Emergency Medicine)  oYlanda Viveros, PhD LP as Psychologist (Psychology)  Bety Mcintosh MD as MD (Pediatrics)  Maricruz Oliveira, PhD LP as Assigned Behavioral Health Provider  Trey Souza MD as MD (Pediatric Pulmonology)  Trey Souza MD as Assigned Pediatric Specialist Provider  SANTANA JOLLY    Copy to patient  ANAHY LOPEZ JEROMIE  [address] [facility] [id number]

## 2024-06-11 NOTE — ED NOTES
Xray at bedside     Brayden Martines, [address] [place] [address]  02/08/22 2055 Where Is Your Acne Located?: Face. Additional Comments (Use Complete Sentences): No previous treatment. Patient comes in with mother reporting acne on face.

## 2024-06-17 NOTE — PROGRESS NOTES
[facility] - PODIATRY    Today's Date: 06/17/24    Patient Name: Jaqueline Katz  MRN: [id number]  CSN: [id number]  PCP: Rosalinda Irizarry MD,  Referring Provider: Rosalinda Irizarry*    SUBJECTIVE     Chief Complaint   Patient presents with    Left Foot - Establish Care, Fracture     Referral from Dr Irizarry for a fracture left fifth metatarsal, she has had pain in left foot for a month     HPI: Jaqueline Katz, a 68 y.o.female, presents to clinic.    Patient is a 68-year-old female presenting with left foot pain.  Patient states has been hurting for about 4 to 6 weeks.  She says she does not remember specifically injuring it.  Patient states it is sore to the touch.  She has not worn a boot.    Past Medical History:   Diagnosis Date    Age-related osteoporosis without current pathological fracture     Allergic rhinitis due to pollen     Anxiety     Cancer     Contact with and (suspected) exposure to other viral communicable diseases     Depression     Essential (primary) hypertension     Gastro-esophageal reflux disease without esophagitis     Mixed irritable bowel syndrome     Nausea with vomiting, unspecified     Osteopenia     Osteoporosis     ON PROLIA    Primary osteoarthritis of both ankles     Primary osteoarthritis of both feet     Primary osteoarthritis of both hands      Past Surgical History:   Procedure Laterality Date    APPENDECTOMY      CHOLECYSTECTOMY      SHOULDER SURGERY Left      Family History   Problem Relation Age of Onset    Heart failure Mother     Hyperthyroidism Mother     Heart attack Father     Diabetes type II Sister     Hyperthyroidism Sister     Cancer Sister     Hyperlipidemia Sister     Alcohol abuse Brother     Hyperthyroidism Nephew     Aortic stenosis Other     Stroke Other     OCD Son     Hyperlipidemia Sister     Hyperlipidemia Sister     Hyperlipidemia Brother     Hyperlipidemia Brother      Social History     Socioeconomic History    Marital status:  Vancomycin IV Pharmacy-to-Dose Consultation    Steve Blackman is a 60 y o  male who is currently receiving Vancomycin IV with management by the Pharmacy Consult service  Assessment/Plan:  The patient was reviewed  Remains on CRRT per nephrology recommendations  Based on todays assessment, continue current vancomycin (day # 7) dosing of 1750 mg ever 24 h  No further levels unless patient continues on therapy past 2/21 or if CRRT changes  We will continue to follow the patients culture results and clinical progress daily      Juarez To, Pharmacist     Number of children: 3   Tobacco Use    Smoking status: Former     Current packs/day: 0.00     Average packs/day: 1.5 packs/day for 25.0 years (37.5 ttl pk-yrs)     Types: Cigarettes     Start date: 1985     Quit date: 2010     Years since quittin.4    Smokeless tobacco: Never   Vaping Use    Vaping status: Never Used   Substance and Sexual Activity    Alcohol use: Yes     Alcohol/week: 1.0 standard drink of alcohol     Types: 1 Glasses of wine per week     Comment: Wine maybe twice a week at dinner    Drug use: Never    Sexual activity: Yes     Partners: Male     Comment: Too old     Allergies   Allergen Reactions    Penicillins Hives    Nsaids Nausea And Vomiting     Current Outpatient Medications   Medication Sig Dispense Refill    acetaminophen (TYLENOL) 500 MG tablet Take 2 tablets by mouth 4 (Four) Times a Day.      amLODIPine (NORVASC) 2.5 MG tablet TAKE ONE TABLET BY MOUTH EVERY DAY FOR BLOOD PRESSURE 90 tablet 1    atenolol (TENORMIN) 25 MG tablet Take 2 tablets by mouth Daily. 180 tablet 1    busPIRone (BUSPAR) 10 MG tablet TAKE ONE TABLET BY MOUTH THREE TIMES DAILY 90 tablet 5    celecoxib (CeleBREX) 200 MG capsule TAKE ONE CAPSULE BY MOUTH TWICE DAILY WITH FOOD AS NEEDED 90 capsule 1    cetirizine (zyrTEC) 10 MG tablet Take 1 tablet every day by oral route.      Cholecalciferol 100 MCG (4000 UT) capsule Take 1 capsule every day by oral route.      Cobalamin Combinations (Vitamin B12-Folic Acid) 500-400 MCG tablet Take 1 tablet every day by oral route.      denosumab (Prolia) 60 MG/ML solution prefilled syringe syringe 1 mL Every 6 (Six) Months.      DULoxetine (CYMBALTA) 60 MG capsule Take 1 capsule by mouth 2 (Two) Times a Day. 180 capsule 1    esomeprazole (nexIUM) 40 MG capsule TAKE ONE CAPSULE BY MOUTH EVERY DAY 90 capsule 1    FeroSul 325 (65 Fe) MG tablet TAKE ONE TABLET BY MOUTH EVERY DAY WITH FOOD 30 tablet 5    Fiber-Lax 625 MG tablet TAKE ONE TABLET BY MOUTH TWICE DAILY  with 8 ounces of water. MAY take BOTH AT ONCE AND increase UP TO 4 tabs DAILY AS tolerated.      folic acid (FOLVITE) 1 MG tablet Take 1 tablet by mouth Daily.      losartan (COZAAR) 100 MG tablet Take 1 tablet by mouth Daily. 90 tablet 1    methocarbamol (ROBAXIN) 750 MG tablet Take 1 tablet by mouth 4 (Four) Times a Day.      methotrexate 2.5 MG tablet TAKE EIGHT TABLETS BY MOUTH ONCE WEEKLY AS DIRECTED 96 tablet 0    montelukast (SINGULAIR) 10 MG tablet TAKE ONE TABLET BY MOUTH EVERY EVENING 90 tablet 3    olopatadine (PATANOL) 0.1 % ophthalmic solution Patanol 0.1 % eye drops   INSTILL 1 DROP INTO AFFECTED EYE(S) BY OPHTHALMIC ROUTE 2 TIMES PER DAY AT AN INTERVAL OF 6 TO 8 HOURS      ondansetron ODT (ZOFRAN-ODT) 4 MG disintegrating tablet Place 1 tablet on the tongue Every 8 (Eight) Hours As Needed for Nausea or Vomiting. 20 tablet 0    Prucalopride Succinate (Motegrity) 2 MG tablet Take 1 tablet by mouth Daily.      RSVPreF3 Vac Recomb Adjuvanted (Arexvy) 120 MCG/0.5ML reconstituted suspension injection Inject  into the appropriate muscle as directed by prescriber. 1 each 0    predniSONE (DELTASONE) 5 MG tablet Take 8 tablets by mouth on day 1, then 7 tablets on day 2, 6 tablets on day 3, 5 tablets on day 4, 4 tablets on day 5, 3 tablets on day 6, 2 tablets on day 7, 1 tablet on day 8 (Patient not taking: Reported on 6/17/2024) 36 tablet 0     Current Facility-Administered Medications   Medication Dose Route Frequency Provider Last Rate Last Admin    denosumab (PROLIA) syringe 60 mg  60 mg Subcutaneous Q6 Months Rosalinda Irizarry MD   60 mg at 05/31/24 5917     Review of Systems   Constitutional: Negative.    HENT: Negative.     Eyes: Negative.    Respiratory: Negative.     Cardiovascular: Negative.    Gastrointestinal: Negative.    Endocrine: Negative.    Genitourinary: Negative.    Musculoskeletal: Negative.         Left foot pain   Skin: Negative.    Allergic/Immunologic: Negative.    Neurological:  Negative.    Hematological: Negative.    Psychiatric/Behavioral: Negative.     All other systems reviewed and are negative.      OBJECTIVE     Vitals:    06/17/24 1302   BP: 138/78   Pulse: 117   Temp: 98 °F (36.7 °C)   SpO2: 94%       WBC   Date Value Ref Range Status   05/16/2024 7.12 3.70 - 10.30 10*3/uL Final     RBC   Date Value Ref Range Status   05/16/2024 3.01 (L) 3.90 - 5.20 10*6/uL Final     Hemoglobin   Date Value Ref Range Status   05/16/2024 9.0 (L) 11.2 - 15.7 g/dL Final     Hematocrit   Date Value Ref Range Status   05/16/2024 28.2 (L) 34.0 - 45.0 % Final     MCV   Date Value Ref Range Status   05/16/2024 94 79 - 98 fL Final     MCH   Date Value Ref Range Status   05/16/2024 29.9 26.0 - 32.0 pg Final     MCHC   Date Value Ref Range Status   05/16/2024 31.9 30.7 - 35.5 g/dL Final     RDW   Date Value Ref Range Status   05/16/2024 16.6 (H) 11.5 - 14.5 % Final     RDW-SD   Date Value Ref Range Status   05/10/2024 53.5 37.0 - 54.0 fl Final     MPV   Date Value Ref Range Status   05/16/2024 10.3 8.8 - 12.5 fL Final     Platelets   Date Value Ref Range Status   05/16/2024 183 155 - 369 10*3/uL Final     Neutrophil %   Date Value Ref Range Status   05/10/2024 79.2 (H) 42.7 - 76.0 % Final     Lymphocyte %   Date Value Ref Range Status   05/10/2024 11.6 (L) 19.6 - 45.3 % Final     Monocyte %   Date Value Ref Range Status   05/10/2024 7.8 5.0 - 12.0 % Final     Eosinophil %   Date Value Ref Range Status   05/10/2024 0.4 0.3 - 6.2 % Final     Basophil %   Date Value Ref Range Status   05/10/2024 0.7 0.0 - 1.5 % Final     Immature Grans %   Date Value Ref Range Status   05/10/2024 0.3 0.0 - 0.5 % Final     Neutrophils, Absolute   Date Value Ref Range Status   05/10/2024 7.82 (H) 1.70 - 7.00 10*3/mm3 Final     Lymphocytes, Absolute   Date Value Ref Range Status   05/10/2024 1.14 0.70 - 3.10 10*3/mm3 Final     Monocytes, Absolute   Date Value Ref Range Status   05/10/2024 0.77 0.10 - 0.90 10*3/mm3 Final      Eosinophils, Absolute   Date Value Ref Range Status   05/10/2024 0.04 0.00 - 0.40 10*3/mm3 Final     Basophils, Absolute   Date Value Ref Range Status   05/10/2024 0.07 0.00 - 0.20 10*3/mm3 Final     Immature Grans, Absolute   Date Value Ref Range Status   05/10/2024 0.03 0.00 - 0.05 10*3/mm3 Final     nRBC   Date Value Ref Range Status   05/16/2024 0.0 <=0.0 per 100 WBCs Final   04/03/2023 0.1 0.0 - 0.2 /100 WBC Final         Lab Results   Component Value Date    GLUCOSE 165 (H) 05/14/2024    BUN 26 (H) 05/10/2024    CREATININE 1.37 (H) 05/10/2024    EGFRIFNONA 59 (L) 02/07/2022    BCR 19.0 05/10/2024    K 4.5 05/14/2024    CO2 21.3 (L) 05/10/2024    CALCIUM 9.0 05/10/2024    ALBUMIN 3.8 05/10/2024    LABIL2 1.5 03/24/2021    AST 24 05/10/2024    ALT 18 05/10/2024       Patient seen in no apparent distress.      PHYSICAL EXAM:     Foot/Ankle Exam    GENERAL  Appearance:  appears stated age  Orientation:  AAOx3  Affect:  appropriate  Gait:  unimpaired  Assistance:  independent  Right shoe gear: casual shoe  Left shoe gear: casual shoe    VASCULAR     Right Foot Vascularity   Normal vascular exam    Dorsalis pedis:  2+  Posterior tibial:  2+  Skin temperature:  warm  Edema grading:  None  CFT:  < 3 seconds  Pedal hair growth:  Present  Varicosities:  none     Left Foot Vascularity   Normal vascular exam    Dorsalis pedis:  2+  Posterior tibial:  2+  Skin temperature:  warm  Edema grading:  None  CFT:  < 3 seconds  Pedal hair growth:  Present  Varicosities:  none     NEUROLOGIC     Right Foot Neurologic   Normal sensation    Light touch sensation: normal  Vibratory sensation: normal  Hot/Cold sensation: normal  Protective Sensation using [place] Monofilament:   Sites intact: 10  Sites tested: 10     Left Foot Neurologic   Normal sensation    Light touch sensation: normal  Vibratory sensation: normal  Hot/Cold sensation:  normal  Protective Sensation using [place] Monofilament:   Sites intact:  10  Sites tested: 10    MUSCULOSKELETAL     Left Foot Musculoskeletal   Tenderness:  fifth metatarsal base tenderness    MUSCLE STRENGTH     Right Foot Muscle Strength   Foot dorsiflexion:  4  Foot plantar flexion:  4  Foot inversion:  4  Foot eversion:  4     Left Foot Muscle Strength   Foot dorsiflexion:  4  Foot plantar flexion:  4  Foot inversion:  4  Foot eversion:  4    RANGE OF MOTION     Right Foot Range of Motion   Foot and ankle ROM within normal limits       Left Foot Range of Motion   Foot and ankle ROM within normal limits      DERMATOLOGIC      Right Foot Dermatologic   Skin  Right foot skin is intact.      Left Foot Dermatologic   Skin  Left foot skin is intact.       RADIOLOGY:      DEXA Bone Density Axial    Result Date: 6/6/2024  Narrative: DEXA BONE DENSITY AXIAL Date of Exam: 6/6/2024 10:56 AM EDT Indication: screening. Comparison: 5/4/2022 Findings: Lumbar Spine The BMD measured in the L1-L4 region is 0.940 g/cm2 with a T-score of -2.0. This patient is considered osteopenic according to World Health Organization (WHO) criteria. When compared to the previous examination dated 5/4/2022, the bone mineral density of the L1-L4 spine has apparently increased 3.9%. Femoral Neck The BMD measured at the left femoral neck is 0.744 g/cm2 with a T-score of -2.1. The BMD measured at the right femoral neck is 0.627 g/cm2 with a T-score of -3.0. The BMD of the mean femoral neck is 0.685 g/cm2 with a T score of -2.5. This patient is considered osteoporotic according to World Health Organization (WHO) criteria. When compared to the previous examination dated 5//2022, the bone mineral density of the femoral neck mean has apparently increased 4.7%. Total Hip The BMD of the total left hip is 0.814 g/cm2 with a T-score of -1.5. The BMD of the total right hip is 0.706 g/cm2 with a T-score of -2.4. The BMD of the mean total hip is 0.760 g/cm2 with a T-score of -2.0. This patient is considered osteopenic according to  World Health Organization (WHO) criteria. FRAX Score: The estimated 10 year risk of a major osteoporotic fracture is calculated to be 28.3% and a hip fracture of 8.3%.     Impression: Impression: Osteoporosis Report dictated by: Zoey Letitia  I have personally reviewed this case and agree with the findings above: Electronically Signed: Henri Rebolledo MD  6/6/2024 3:24 PM EDT  Workstation ID: [id number]    XR Foot 3+ View Left    Result Date: 6/3/2024  Narrative: XR FOOT 3+ VW LEFT-  Date of Exam: 5/31/2024 3:13 PM  Indication: acute left foot pain; m79.672-pain in left foot; h/o lateral left foot pain after twisting injury about one month ago  Comparison: None available.  Findings:  3 nonweightbearing views of the left foot are provided for review. There is a suspected subacute-to-chronic transverse intra-articular closed nondisplaced nearly healed fracture of the base of the left fifth metatarsal bone. Please correlate clinically. No comparison studies are available. There are mild degenerative changes of the left foot. Mild hallux valgus deformity involves the left foot, estimated at 27 degrees or slightly greater. No acute fracture. No dislocation. No retained radiopaque foreign body. No subcutaneous emphysema. If symptoms or clinical concerns persist, consider imaging follow-up      Impression: There is a suspected subacute-to-chronic nearly healed transverse intra-articular fracture of the base of the left fifth metatarsal bone, as discussed. No acute fracture seen. No dislocation.      Please note that portions of this note were completed with a voice recognition program.        Electronically Signed By-Kentrell Mendoza MD On:6/3/2024 5:05 AM       ASSESSMENT/PLAN     Diagnoses and all orders for this visit:    1. Closed nondisplaced fracture of fifth metatarsal bone of left foot, initial encounter (Primary)    2. Left foot pain        Comprehensive lower extremity examination and evaluation was  performed.    Patient to begin stretching exercises and icing in the evening as tolerated. Discussed compression therapy and resting the extremity.  Anti-inflammatory medication to begin taking if okay by PCP.    Weightbearing as tolerated in the boot    Return to clinic in 1 month    Discussed findings and treatment plan including risks, benefits, and treatment options with patient in detail. Patient agreed with treatment plan.    Medications and allergies reviewed.  Reviewed available lab values along with other pertinent labs.  These were discussed with the patient.    An After Visit Summary was printed and given to the patient at discharge, including (if requested) any available informative/educational handouts regarding diagnosis, treatment, or medications. All questions were answered to patient/family satisfaction. Should symptoms fail to improve or worsen they agree to call or return to clinic or to go to the Emergency Department. Discussed the importance of following up with any needed screening tests/labs/specialist appointments and any requested follow-up recommended by me today. Importance of maintaining follow-up discussed and patient accepts that missed appointments can delay diagnosis and potentially lead to worsening of conditions.    Return in about 3 months (around 9/17/2024)., or sooner if acute issues arise.    This document has been electronically signed by Jules Bosch DPM on June 17, 2024 14:15 EDT

## 2024-12-22 NOTE — ASSESSMENT & PLAN NOTE
· Likely due to chronic liver disease  · Follow the CBC after discharge with his PCP  · Outpatient Hematology/Oncology evaluation    Results from last 7 days   Lab Units 09/24/21  0429 09/23/21  0633 09/22/21  0424   WBC Thousand/uL 2 15* 2 00* 2 69*   HEMOGLOBIN g/dL 8 3* 8 1* 8 1*   HEMATOCRIT % 27 3* 28 1* 28 1*   PLATELETS Thousands/uL 172 170 129* No.

## 2025-05-08 NOTE — ASSESSMENT & PLAN NOTE
· Patient with new nausea and vomiting on 2/15; appeared to be bilious at that time  · He was medicated with Zofran which was transitioned to Tigan secondary to prolonged Qtc   · Patient then developed massive amounts of emesis   · CT abd/chest obtained and revealed ileus   · Continue OG to low continous suction 08-May-2025 09:06

## 2025-05-27 NOTE — PROGRESS NOTES
Progress Note - Nephrology   Niki Sweeney 57 y o  male MRN: [id number]  Unit/Bed#: [facility] 422-01 Encounter: [id number]      Assessment / Plan:    Acute kidney injury (POA)  · Felt to have ATN due to hypotension, Ace inhibition, cardiac factors  · Peak creatinine was on admission, 2 8 on 05/21  · Creatinine has improved to baseline and currently is 1 18     CKD stage 3  · Baseline creatinine 1 5-1 8  · CKD felt secondary hypertension and cardiac issues per outpatient notes  · + some cm hypoenhancing lesions too small to categorize but are Bosniak 2 benign lesions noted on CT scan from 02/'21     Hyponatremia  · Felt secondary to volume overload  · Follows with Dr Caitlyn Glasgow as an outpatient  · The patient does seem to have intermittent hyponatremia in the past dating back to 2015  · Sodium level is currently acceptable and normalized at 135  · Underlying cirrhosis may be playing a role as well     Chronic diastolic heart failure  · Diuresed - overall -15 L but overall weight has not changed much (136 to 134 Kg)  · The patient feels his peripheral edema is better than usual  · Will continue current oral diuretics     Cirrhosis     Atrial fibrillation / diabetes mellitus type 2 / chronic anemia / type 2 diabetes mellitus  · Decreased hemoglobin per primary team - received blood transfusion in 6/7      Hypotension  · + midodrine  · Based on record review back through April 1, 2021 it seems like patient's baseline blood pressure runs in the low 100's systolic     Right pleural effusion  · + small loculated right hydropneumothorax  · Status post VATS with decortication 6/2   · Chest tube removed 6/7     Wrist pain/right hand swelling  · Per primary team     Disposition:  Stable for discharge from a renal standpoint  Has an established office visit for 7/7 with Dr Caitlyn Glasgow  Would send the patient home on his usual outpatient diuretic regimen and have him weigh himself every day    If his weight increases by 3 lb or Transitions of Care (test claim):    Drug Eliquis:   Covered/PA Required: Covered without PA  Copay Per Month: $47  Is the medication eligible for a trial card/copay card? Free trial available from     Please note that when it states medication is eligible for a trial card that this only means that the medication has a free trial available. This does not assess if the patient has already utilized the once in a lifetime free trial.     This test claim coverage is only valid on the date the note is published. Copay/coverage could vary depending on patient coverage at a later date.  Additionally this test claim is for the sole purpose of a price check not a clinical recommendation.     For billing questions please call [facility] Pharmacist at ext: [phone]  For M2B questions please call [facility] at ext: [phone]     more, he should phone Dr Fide Kovacs for diuretic adjustment        Subjective: The patient was seen examined early this morning  He denied any shortness of breath, chest pain or pressure, abdominal pain, vomiting, diarrhea, sweats, chills, paroxysmal nocturnal dyspnea orthopnea  Objective:     Vitals: Blood pressure 114/73, pulse 89, temperature 98 3 °F (36 8 °C), resp  rate 20, height 6' (1 829 m), weight 134 kg (296 lb 1 2 oz), SpO2 99 %  ,Body mass index is 40 16 kg/m²  Temp (24hrs), Av 1 °F (36 7 °C), Min:97 5 °F (36 4 °C), Max:99 °F (37 2 °C)      Weight (last 2 days)     Date/Time   Weight    21 0536   134 (296 08)    21 0600   135 (296 52)    21 0600   134 (295 2)                     Intake/Output Summary (Last 24 hours) at 2021 1131  Last data filed at 2021 0834  Gross per 24 hour   Intake 300 ml   Output 1370 ml   Net -1070 ml     I/O last 24 hours: In: 1020 [P O :1020]  Out: 1520 [Urine:1520]        Physical Exam    /73   Pulse 89   Temp 98 3 °F (36 8 °C)   Resp 20   Ht 6' (1 829 m) Comment: per pt report  Wt 134 kg (296 lb 1 2 oz)   SpO2 99%   BMI 40 16 kg/m²   Vital signs were reviewed  Constitutional:  Patient was awake, alert, pleasant, cooperative and in no apparent distress  Cardiovascular:  No overt jugular venous distention was noted, S1-S2, no pericardial friction rub S3 were appreciated, peripheral edema persisted was unchanged    Chronic brawny skin changes noted  Pulmonary:  Adequate inspiratory effort, no rales/rhonchi wheezing noted lungs were clear  Abdominal/GI:  Soft, nontender, no rebound or guarding was noted  Skin:  No hives were no            Invasive Devices     Peripheral Intravenous Line            Peripheral IV 21 Right;Ventral (anterior) Forearm 3 days                Medications:    Scheduled Meds:  Current Facility-Administered Medications   Medication Dose Route Frequency Provider Last Rate    acetaminophen  650 mg Oral Q6H [address] [place] [address] Avenue, PAWANDA      allopurinol  100 mg Oral BID Maryisaura Zengt, DO      bisacodyl  10 mg Oral Daily Roosevelt Felix, DO      bisacodyl  10 mg Rectal Daily PRN Wero Yang, DO      bumetanide  1 mg Oral BID Leanna Fowler MD      calcium carbonate  1,000 mg Oral Daily PRN Oralia Maloney, PALibertadC      colchicine  0 6 mg Oral Daily Bryanston, DO      ergocalciferol  50,000 Units Oral Weekly Oralia Maloney, PA-C      gabapentin  300 mg Oral TID Oralia Maloney, PA-C      HYDROmorphone  0 2 mg Intravenous Q4H PRN Kyle Davenport, PA-C      insulin lispro  1-6 Units Subcutaneous TID AC Alejandro Ventura, DO      lidocaine   Topical Daily PRN Oralia Maloney, PA-PADMA      menthol-methyl salicylate   Apply externally 4x Daily PRN Oralia Maloney, PA-C      metoprolol  2 5 mg Intravenous Q6H PRN Naty Davenport, PAWANDA      metoprolol succinate  25 mg Oral BID Zettie Fat, DO      midodrine  10 mg Oral TID AC Oralia Maloney, PA-C      nicotine  1 patch Transdermal Daily Oralia Maloney, PA-C      ondansetron  4 mg Intravenous Q6H PRN Oralia Maloney, PA-C      oxyCODONE  2 5 mg Oral Q4H PRN Oralia Maloney, PA-C      oxyCODONE  5 mg Oral Q4H PRN Oralia Maloney, PA-C      polyethylene glycol  17 g Oral BID Wero Yang, DO      senna-docusate sodium  1 tablet Oral BID Wero Yang, DO      warfarin  5 mg Oral Daily (warfarin) Jazmine Sandra MD         PRN Meds: bisacodyl    calcium carbonate    HYDROmorphone    lidocaine    menthol-methyl salicylate    metoprolol    ondansetron    oxyCODONE    oxyCODONE    Continuous Infusions:         LAB RESULTS:      Results from last 7 days   Lab Units 06/09/21  0451 06/08/21  0439 06/07/21  1407 06/07/21  0505 06/06/21  0440 06/05/21  0526 06/04/21  0438 06/03/21  0443 06/02/21  1959  06/02/21  1615 06/02/21  1535 06/02/21  1422   WBC Thousand/uL 4 93 4 80  --  4 84 6 13 6 41 8 17 7 81 7 26   < >  --   --   --    HEMOGLOBIN g/dL 8 2* 7 6* 8  8* 7 0* 8 1* 7 5* 7 8* 7 2* 7 4*   < >  --   --   --    I STAT HEMOGLOBIN g/dl  --   --   --   --   --   --   --   --   --   --  7 5* 7 8* 7 8*   HEMATOCRIT % 26 5* 24 7* 28 2* 22 2* 26 1* 24 6* 25 6* 23 6* 23 8*   < >  --   --   --    HEMATOCRIT, ISTAT %  --   --   --   --   --   --   --   --   --   --  22* 23* 23*   PLATELETS Thousands/uL 275 248  --  241 240 201 217 192 170   < >  --   --   --    NEUTROS PCT % 71 70  --  71 75 75 75 90*  --   --   --   --   --    LYMPHS PCT % 11* 12*  --  13* 11* 11* 10* 4*  --   --   --   --   --    MONOS PCT % 13* 12  --  10 9 10 11 5  --   --   --   --   --    EOS PCT % 4 4  --  4 3 3 2 0  --   --   --   --   --    POTASSIUM mmol/L 4 3 4 3  --  4 2 4 2 4 5 4 1 4 6 4 3   < >  --   --   --    CHLORIDE mmol/L 100 101  --  99* 99* 103 102 102 102   < >  --   --   --    CO2 mmol/L 31 30  --  31 31 30 30 29 29   < >  --   --   --    CO2, I-STAT mmol/L  --   --   --   --   --   --   --   --   --   --  29 30 31   BUN mg/dL 25 27*  --  30* 33* 37* 40* 33* 32*   < >  --   --   --    CREATININE mg/dL 1 18 1 27  --  1 27 1 28 1 39* 1 58* 1 46* 1 24   < >  --   --   --    CALCIUM mg/dL 9 0 8 6  --  8 9 8 8 8 7 9 0 8 6 9 0   < >  --   --   --    ALK PHOS U/L  --  258*  --   --  251* 174* 161* 142*  --   --   --   --   --    ALT U/L  --  14  --   --  12 9* 13 17  --   --   --   --   --    AST U/L  --  22  --   --  19 15 17 18  --   --   --   --   --    GLUCOSE, ISTAT mg/dl  --   --   --   --   --   --   --   --   --   --  119 108 107   MAGNESIUM mg/dL  --   --   --   --   --   --   --   --  2 4  --   --   --   --     < > = values in this interval not displayed  CUTURES:  Lab Results   Component Value Date    BLOODCX No Growth After 5 Days  02/22/2021    BLOODCX No Growth After 5 Days  02/22/2021                 PLEASE NOTE:  This encounter was completed utilizing the [email or web address] Direct Speech Voice Recognition Software   Grammatical errors, random word insertions, pronoun errors and incomplete sentences are occasional consequences of the system due to software limitations, ambient noise and hardware issues  These may be missed by proof reading prior to affixing electronic signature  Any questions or concerns about the content, text or information contained within the body of this dictation should be directly addressed to the physician for clarification  Please do not hesitate to call me directly if you have any any questions or concerns

## (undated) DEVICE — SUT PROLENE 0 CT-1 30 IN 8424H

## (undated) DEVICE — SUT VICRYL 0 CT-1 27 IN J260H

## (undated) DEVICE — INTENDED FOR TISSUE SEPARATION, AND OTHER PROCEDURES THAT REQUIRE A SHARP SURGICAL BLADE TO PUNCTURE OR CUT.: Brand: BARD-PARKER SAFETY BLADES SIZE 15, STERILE

## (undated) DEVICE — 28 FR STRAIGHT – SOFT PVC CATHETER: Brand: PVC THORACIC CATHETERS

## (undated) DEVICE — ENDOPATH 5MM ENDOSCOPIC BLUNT TIP DISSECTORS (12 POUCHES CONTAINING 3 DISSECTORS EACH): Brand: ENDOPATH

## (undated) DEVICE — ADHESIVE SKIN HIGH VISCOSITY EXOFIN 1ML

## (undated) DEVICE — SUT MONOCRYL 4-0 PS-2 18 IN Y496G

## (undated) DEVICE — TUBING SUCTION 5MM X 12 FT

## (undated) DEVICE — 2000CC GUARDIAN II: Brand: GUARDIAN

## (undated) DEVICE — SUT VICRYL 0 UR-6 27 IN J603H

## (undated) DEVICE — OASIS DRAIN, SINGLE, INLINE & ATS COMPATIBLE: Brand: OASIS

## (undated) DEVICE — TRAY FOLEY 16FR URIMETER SURESTEP

## (undated) DEVICE — WOUND RETRACTOR AND PROTECTOR: Brand: ALEXIS WOUND PROTECTOR-RETRACTOR

## (undated) DEVICE — STERILE THORACIC PACK: Brand: CARDINAL HEALTH

## (undated) DEVICE — NEEDLE SPINAL 20G X 3.5 LF

## (undated) DEVICE — 28 FR RIGHT ANGLE – SOFT PVC CATHETER: Brand: PVC THORACIC CATHETERS

## (undated) DEVICE — GAUZE SPONGES,16 PLY: Brand: CURITY

## (undated) DEVICE — PAD GROUNDING ADULT

## (undated) DEVICE — ELECTRODE LAP L WIRE E-Z CLEAN 33CM -0100

## (undated) DEVICE — GLOVE SRG BIOGEL ECLIPSE 6

## (undated) DEVICE — GLOVE INDICATOR PI UNDERGLOVE SZ 6.5 BLUE

## (undated) DEVICE — SUT VICRYL 3-0 SH 27 IN J416H

## (undated) DEVICE — ELECTRODE BLADE MOD E-Z CLEAN 2.5IN 6.4CM -0012M

## (undated) DEVICE — SUT VICRYL 2-0 SH 27 IN UNDYED J417H